# Patient Record
Sex: FEMALE | Race: WHITE | NOT HISPANIC OR LATINO | Employment: OTHER | URBAN - METROPOLITAN AREA
[De-identification: names, ages, dates, MRNs, and addresses within clinical notes are randomized per-mention and may not be internally consistent; named-entity substitution may affect disease eponyms.]

---

## 2017-02-23 ENCOUNTER — OFFICE VISIT (OUTPATIENT)
Dept: URGENT CARE | Facility: URGENT CARE | Age: 82
End: 2017-02-23
Payer: COMMERCIAL

## 2017-02-23 VITALS
RESPIRATION RATE: 20 BRPM | BODY MASS INDEX: 43.59 KG/M2 | SYSTOLIC BLOOD PRESSURE: 161 MMHG | WEIGHT: 278.3 LBS | OXYGEN SATURATION: 98 % | TEMPERATURE: 98 F | DIASTOLIC BLOOD PRESSURE: 89 MMHG | HEART RATE: 108 BPM

## 2017-02-23 DIAGNOSIS — H65.00 ACUTE SEROUS OTITIS MEDIA, RECURRENCE NOT SPECIFIED, UNSPECIFIED LATERALITY: Primary | ICD-10-CM

## 2017-02-23 PROCEDURE — 99213 OFFICE O/P EST LOW 20 MIN: CPT | Performed by: NURSE PRACTITIONER

## 2017-02-23 RX ORDER — AMOXICILLIN 500 MG/1
500 CAPSULE ORAL 3 TIMES DAILY
Qty: 30 CAPSULE | Refills: 0 | Status: SHIPPED | OUTPATIENT
Start: 2017-02-23 | End: 2017-03-05

## 2017-02-23 NOTE — NURSING NOTE
"Chief Complaint   Patient presents with     Ear Problem     Lt ear pain        Initial /89 (BP Location: Right arm, Patient Position: Chair, Cuff Size: Adult Large)  Pulse 108  Temp 98  F (36.7  C) (Oral)  Resp 20  Wt 278 lb 4.8 oz (126.2 kg)  SpO2 98%  BMI 43.59 kg/m2 Estimated body mass index is 43.59 kg/(m^2) as calculated from the following:    Height as of 12/16/16: 5' 7\" (1.702 m).    Weight as of this encounter: 278 lb 4.8 oz (126.2 kg).      "

## 2017-02-23 NOTE — PATIENT INSTRUCTIONS
Middle Ear Infection (Adult)  You have an infection of the middle ear (the space behind the eardrum). This is also called acute otitis media (AOM). Sometimes it is caused by the common cold. This is because congestion can block the internal passage (eustachian tube) that drains fluid from the middle ear. When the middle ear fills with fluid, bacteria can grow there and cause an infection. Oral antibiotics are used to treat this illness, not ear drops. Symptoms usually start to improve within 1 to 2 days of treatment.    Home care  The following are general care guidelines:    Finish all of the antibiotic medicine given, even though you may feel better after the first few days.    You may use acetaminophen or ibuprofen to control pain, unless something else was prescribed. [NOTE: If you have chronic liver or kidney disease or have ever had a stomach ulcer or GI bleeding, talk with your doctor before using these medicines.] Do not give aspirin to anyone under 18 years of age who has a fever. It may cause severe liver damage.  Follow-up care  Follow up with your doctor in 2 weeks if all symptoms have not gotten better, or if hearing doesn't go back to normal within 1 month.  When to seek medical care  Get prompt medical attention if any of the following occur:    Ear pain gets worse or does not improve after 3 days of treatment    Unusual drowsiness or confusion    Neck pain, stiff neck, or headache    Fluid or blood draining from the ear canal    Fever of 100.4 F (38 C) or higher after 3 days of antibiotics, or as directed by your health care provider    Convulsion (seizure)    4363-9170 [employer]. [address] [place] [address], [place] [address]. All rights reserved. This information is not intended as a substitute for professional medical care. Always follow your healthcare professional's instructions.

## 2017-02-23 NOTE — MR AVS SNAPSHOT
After Visit Summary   2/23/2017    Moira Andre    MRN: [id number]           Patient Information     Date Of Birth          9/24/1933        Visit Information        Provider Department      2/23/2017 5:00 PM Javon Mota APRN CNP [facility]        Today's Diagnoses     Acute serous otitis media, recurrence not specified, unspecified laterality    -  1      Care Instructions      Middle Ear Infection (Adult)  You have an infection of the middle ear (the space behind the eardrum). This is also called acute otitis media (AOM). Sometimes it is caused by the common cold. This is because congestion can block the internal passage (eustachian tube) that drains fluid from the middle ear. When the middle ear fills with fluid, bacteria can grow there and cause an infection. Oral antibiotics are used to treat this illness, not ear drops. Symptoms usually start to improve within 1 to 2 days of treatment.    Home care  The following are general care guidelines:    Finish all of the antibiotic medicine given, even though you may feel better after the first few days.    You may use acetaminophen or ibuprofen to control pain, unless something else was prescribed. [NOTE: If you have chronic liver or kidney disease or have ever had a stomach ulcer or GI bleeding, talk with your doctor before using these medicines.] Do not give aspirin to anyone under 18 years of age who has a fever. It may cause severe liver damage.  Follow-up care  Follow up with your doctor in 2 weeks if all symptoms have not gotten better, or if hearing doesn't go back to normal within 1 month.  When to seek medical care  Get prompt medical attention if any of the following occur:    Ear pain gets worse or does not improve after 3 days of treatment    Unusual drowsiness or confusion    Neck pain, stiff neck, or headache    Fluid or blood draining from the ear canal    Fever of 100.4 F (38 C) or higher after 3 days  of antibiotics, or as directed by your health care provider    Convulsion (seizure)    6107-5566 [employer]. [address] [place] [address], [place] [address]. All rights reserved. This information is not intended as a substitute for professional medical care. Always follow your healthcare professional's instructions.              Follow-ups after your visit        Who to contact     If you have questions or need follow up information about today's clinic visit or your schedule please contact [facility] directly at [phone].  Normal or non-critical lab and imaging results will be communicated to you by MyChart, letter or phone within 4 business days after the clinic has received the results. If you do not hear from us within 7 days, please contact the clinic through MyChart or phone. If you have a critical or abnormal lab result, we will notify you by phone as soon as possible.  Submit refill requests through [employer] or call your pharmacy and they will forward the refill request to us. Please allow 3 business days for your refill to be completed.          Additional Information About Your Visit        Care EveryWhere ID     This is your Care EveryWhere ID. This could be used by other organizations to access your [facility] medical records  [id number]        Your Vitals Were     Pulse Temperature Respirations Pulse Oximetry BMI (Body Mass Index)       108 98  F (36.7  C) (Oral) 20 98% 43.59 kg/m2        Blood Pressure from Last 3 Encounters:   02/23/17 161/89   12/27/16 132/80   12/16/16 132/69    Weight from Last 3 Encounters:   02/23/17 278 lb 4.8 oz (126.2 kg)   12/27/16 276 lb (125.2 kg)   12/16/16 275 lb 9.6 oz (125 kg)              Today, you had the following     No orders found for display         Today's Medication Changes          These changes are accurate as of: 2/23/17  6:48 PM.  If you have any questions, ask your nurse or doctor.               Start taking these  medicines.        Dose/Directions    amoxicillin 500 MG capsule   Commonly known as:  AMOXIL   Started by:  Javon Mota APRN CNP        Dose:  500 mg   Take 1 capsule (500 mg) by mouth 3 times daily for 10 days   Quantity:  30 capsule   Refills:  0            Where to get your medicines      These medications were sent to [facility] [address]  [address], [facility] [address]     Phone:  [phone]     amoxicillin 500 MG capsule                Primary Care Provider Office Phone # Fax #    Maryan Churchill [phone]       [facility]    [address] [facility] [address]  [facility] [address]        Thank you!     Thank you for choosing [facility]  for your care. Our goal is always to provide you with excellent care. Hearing back from our patients is one way we can continue to improve our services. Please take a few minutes to complete the written survey that you may receive in the mail after your visit with us. Thank you!             Your Updated Medication List - Protect others around you: Learn how to safely use, store and throw away your medicines at www.disposemymeds.org.          This list is accurate as of: 2/23/17  6:48 PM.  Always use your most recent med list.                   Brand Name Dispense Instructions for use    acetaminophen 325 MG tablet    TYLENOL     Take 325-650 mg by mouth every 6 hours as needed for mild pain       ADVAIR DISKUS 100-50 MCG/DOSE diskus inhaler   Generic drug:  fluticasone-salmeterol     60 Inhaler    INHALE 1 PUFF EVERY 12 HOURS       albuterol 108 (90 BASE) MCG/ACT Inhaler    PROAIR HFA/PROVENTIL HFA/VENTOLIN HFA    3 Inhaler    Inhale 2 puffs into the lungs every 6 hours as needed for shortness of breath / dyspnea       amoxicillin 500 MG capsule    AMOXIL    30 capsule    Take 1 capsule (500 mg) by mouth 3 times daily for 10 days       atorvastatin 20 MG  tablet    LIPITOR    90 tablet    Take 1 tablet (20 mg) by mouth daily       azithromycin 250 MG tablet    ZITHROMAX    6 tablet    Two tablets first day, then one tablet daily for four days.       blood glucose monitoring meter device kit    no brand specified    1 kit    Use to test blood sugar 1-2 times daily or as directed.       blood glucose monitoring test strip    no brand specified    100 each    Use to test blood sugars 1-2 times daily or as directed       calcium-vitamin D 600-400 MG-UNIT per tablet    CALTRATE     Take 1 tablet by mouth 2 times daily       clopidogrel 75 MG tablet    PLAVIX    90 tablet    Take 1 tablet (75 mg) by mouth daily       DULoxetine 30 MG EC capsule    CYMBALTA    90 capsule    Take 1 capsule (30 mg) by mouth daily       FEXOFENADINE HCL PO      Take 180 mg by mouth daily.       FLONASE 50 MCG/ACT spray   Generic drug:  fluticasone      Spray 1 spray into both nostrils daily       Glucosamine HCl 750 MG Tabs      Take 1 tablet by mouth 2 times daily       HAIR/SKIN/NAILS Tabs      Take 1 tablet by mouth daily.       HYDROcodone-acetaminophen 5-325 MG per tablet    NORCO    90 tablet    Take 1 tablet by mouth every 8 hours as needed for moderate to severe pain       IBANdronate 150 MG tablet    BONIVA    3 tablet    Take 1 tablet (150 mg) by mouth every 30 days       lisinopril 5 MG tablet    PRINIVIL/ZESTRIL    90 tablet    Take 1 tablet (5 mg) by mouth daily       metoprolol 100 MG 24 hr tablet    TOPROL-XL    90 tablet    Take 1 tablet (100 mg) by mouth daily       NITROQUICK SL      Place 0.4 mg under the tongue every 5 minutes as needed       nystatin-triamcinolone cream    MYCOLOG II    60 g    Apply topically 2 times daily       order for DME      DREAMSTATION 5-15 CM/H20 NASAL WISP FABRIC       TRUEPLUS LANCETS 28G Misc     100 each    1 each 2 times daily as needed       vitamin D 2000 UNITS Caps      Take by mouth daily

## 2017-02-23 NOTE — PROGRESS NOTES
SUBJECTIVE:   Moira Andre is a 83 year old female here for eval of left ear pain for 4 days. She denies fever, chills, nausea dizziness, headache or hearing loss. States ear pain is worse today tried OTC medication and nothing is helping her.     OBJECTIVE:  She appears well, vital signs are as noted by the nurse. /89 (BP Location: Right arm, Patient Position: Chair, Cuff Size: Adult Large)  Pulse 108  Temp 98  F (36.7  C) (Oral)  Resp 20  Wt 278 lb 4.8 oz (126.2 kg)  SpO2 98%  BMI 43.59 kg/m2   Left TM partially obscured by wax and  erythema noted. Right TM erythema. Throat and pharynx normal.  Neck supple. No adenopathy in the neck. Nose is congested. Sinuses non tender.     ASSESSMENT:   Left otitis media     PLAN:Rx Amoxicillin checked creatinine with pharmacist antibiotic should be ok as prescribed.  She also had this medication in the past without allergy. Symptomatic therapy suggested: push fluids, rest, gargle warm salt water and use vaporizer or mist prn.  Return if these symptoms worsen f/u with primary care provider.

## 2017-03-06 ENCOUNTER — TELEPHONE (OUTPATIENT)
Dept: FAMILY MEDICINE | Facility: CLINIC | Age: 82
End: 2017-03-06

## 2017-03-06 NOTE — TELEPHONE ENCOUNTER
I called patient and spoke with her.  She reports that ears are feeling better and she has finished the ABX as well.  She is wondering if she needs to still do ear drops? Ears are feeling better.   Bought ear plugs for when she goes to [facility].     PCP: okay for patient d/c ear drops. If okay patient would like call back  RN can discontinue off active medication list.    Tamra Mccullough, RN

## 2017-03-31 ENCOUNTER — OFFICE VISIT (OUTPATIENT)
Dept: URGENT CARE | Facility: URGENT CARE | Age: 82
End: 2017-03-31
Payer: COMMERCIAL

## 2017-03-31 VITALS
OXYGEN SATURATION: 95 % | DIASTOLIC BLOOD PRESSURE: 76 MMHG | HEART RATE: 72 BPM | SYSTOLIC BLOOD PRESSURE: 136 MMHG | BODY MASS INDEX: 43.23 KG/M2 | WEIGHT: 276 LBS | TEMPERATURE: 98.1 F

## 2017-03-31 DIAGNOSIS — H92.02 OTALGIA OF LEFT EAR: Primary | ICD-10-CM

## 2017-03-31 PROCEDURE — 99213 OFFICE O/P EST LOW 20 MIN: CPT | Performed by: FAMILY MEDICINE

## 2017-03-31 RX ORDER — AMOXICILLIN 500 MG/1
500 CAPSULE ORAL 3 TIMES DAILY
Qty: 30 CAPSULE | Refills: 0 | Status: SHIPPED | OUTPATIENT
Start: 2017-03-31 | End: 2017-04-10

## 2017-03-31 NOTE — NURSING NOTE
"Chief Complaint   Patient presents with     Ear Problem     lt ear pain for 2 days       Initial /76 (BP Location: Left arm, Patient Position: Chair, Cuff Size: Adult Large)  Pulse 72  Temp 98.1  F (36.7  C) (Oral)  Wt 276 lb (125.2 kg)  SpO2 95%  BMI 43.23 kg/m2 Estimated body mass index is 43.23 kg/(m^2) as calculated from the following:    Height as of 12/16/16: 5' 7\" (1.702 m).    Weight as of this encounter: 276 lb (125.2 kg).  Medication Reconciliation: complete   Blanca REEDER      "

## 2017-03-31 NOTE — MR AVS SNAPSHOT
"              After Visit Summary   3/31/2017    Moira Andre    MRN: [id number]           Patient Information     Date Of Birth          1933        Visit Information        Provider Department      3/31/2017 11:15 AM Leland Palacios, DO [facility]        Today's Diagnoses     Otalgia of left ear    -  1       Follow-ups after your visit        Who to contact     If you have questions or need follow up information about today's clinic visit or your schedule please contact [facility] directly at [phone].  Normal or non-critical lab and imaging results will be communicated to you by [employer], letter or phone within 4 business days after the clinic has received the results. If you do not hear from us within 7 days, please contact the clinic through [employer] or phone. If you have a critical or abnormal lab result, we will notify you by phone as soon as possible.  Submit refill requests through Triage or call your pharmacy and they will forward the refill request to us. Please allow 3 business days for your refill to be completed.          Additional Information About Your Visit        MyChart Information     Triage lets you send messages to your doctor, view your test results, renew your prescriptions, schedule appointments and more. To sign up, go to [email or web address] . Click on \"Log in\" on the left side of the screen, which will take you to the Welcome page. Then click on \"Sign up Now\" on the right side of the page.     You will be asked to enter the access code listed below, as well as some personal information. Please follow the directions to create your username and password.     Your access code is: [email or web address]  Expires: 2017 11:38 AM     Your access code will  in 90 days. If you need help or a new code, please call your [place] clinic or [phone].        Care EveryWhere ID     This is your Care EveryWhere ID. This could " be used by other organizations to access your [facility] medical records  [id number]        Your Vitals Were     Pulse Temperature Pulse Oximetry BMI (Body Mass Index)          72 98.1  F (36.7  C) (Oral) 95% 43.23 kg/m2         Blood Pressure from Last 3 Encounters:   03/31/17 136/76   02/23/17 161/89   12/27/16 132/80    Weight from Last 3 Encounters:   03/31/17 276 lb (125.2 kg)   02/23/17 278 lb 4.8 oz (126.2 kg)   12/27/16 276 lb (125.2 kg)              Today, you had the following     No orders found for display         Today's Medication Changes          These changes are accurate as of: 3/31/17 11:38 AM.  If you have any questions, ask your nurse or doctor.               Start taking these medicines.        Dose/Directions    amoxicillin 500 MG capsule   Commonly known as:  AMOXIL   Used for:  Otalgia of left ear        Dose:  500 mg   Take 1 capsule (500 mg) by mouth 3 times daily for 10 days   Quantity:  30 capsule   Refills:  0            Where to get your medicines      Some of these will need a paper prescription and others can be bought over the counter.  Ask your nurse if you have questions.     Bring a paper prescription for each of these medications     amoxicillin 500 MG capsule                Primary Care Provider Office Phone # Fax #    Maryan Churchill [phone]       [facility]    [address] [facility] [address]  [facility] [address]        Thank you!     Thank you for choosing [facility]  for your care. Our goal is always to provide you with excellent care. Hearing back from our patients is one way we can continue to improve our services. Please take a few minutes to complete the written survey that you may receive in the mail after your visit with us. Thank you!             Your Updated Medication List - Protect others around you: Learn how to safely use, store and throw away your medicines at www.disposemymeds.org.          This  list is accurate as of: 3/31/17 11:38 AM.  Always use your most recent med list.                   Brand Name Dispense Instructions for use    acetaminophen 325 MG tablet    TYLENOL     Take 325-650 mg by mouth every 6 hours as needed for mild pain       ADVAIR DISKUS 100-50 MCG/DOSE diskus inhaler   Generic drug:  fluticasone-salmeterol     60 Inhaler    INHALE 1 PUFF EVERY 12 HOURS       albuterol 108 (90 BASE) MCG/ACT Inhaler    PROAIR HFA/PROVENTIL HFA/VENTOLIN HFA    3 Inhaler    Inhale 2 puffs into the lungs every 6 hours as needed for shortness of breath / dyspnea       amoxicillin 500 MG capsule    AMOXIL    30 capsule    Take 1 capsule (500 mg) by mouth 3 times daily for 10 days       atorvastatin 20 MG tablet    LIPITOR    90 tablet    Take 1 tablet (20 mg) by mouth daily       blood glucose monitoring meter device kit    no brand specified    1 kit    Use to test blood sugar 1-2 times daily or as directed.       blood glucose monitoring test strip    no brand specified    100 each    Use to test blood sugars 1-2 times daily or as directed       calcium-vitamin D 600-400 MG-UNIT per tablet    CALTRATE     Take 1 tablet by mouth 2 times daily       clopidogrel 75 MG tablet    PLAVIX    90 tablet    Take 1 tablet (75 mg) by mouth daily       DULoxetine 30 MG EC capsule    CYMBALTA    90 capsule    Take 1 capsule (30 mg) by mouth daily       FEXOFENADINE HCL PO      Take 180 mg by mouth daily.       FLONASE 50 MCG/ACT spray   Generic drug:  fluticasone      Spray 1 spray into both nostrils daily       Glucosamine HCl 750 MG Tabs      Take 1 tablet by mouth 2 times daily       HAIR/SKIN/NAILS Tabs      Take 1 tablet by mouth daily.       HYDROcodone-acetaminophen 5-325 MG per tablet    NORCO    90 tablet    Take 1 tablet by mouth every 8 hours as needed for moderate to severe pain       IBANdronate 150 MG tablet    BONIVA    3 tablet    Take 1 tablet (150 mg) by mouth every 30 days       lisinopril 5 MG tablet     PRINIVIL/ZESTRIL    90 tablet    Take 1 tablet (5 mg) by mouth daily       metoprolol 100 MG 24 hr tablet    TOPROL-XL    90 tablet    Take 1 tablet (100 mg) by mouth daily       NITROQUICK SL      Place 0.4 mg under the tongue every 5 minutes as needed       nystatin-triamcinolone cream    MYCOLOG II    60 g    Apply topically 2 times daily       order for DME      DREAMSTATION 5-15 CM/H20 NASAL WISP FABRIC       TRUEPLUS LANCETS 28G Misc     100 each    1 each 2 times daily as needed       vitamin D 2000 UNITS Caps      Take by mouth daily

## 2017-04-10 NOTE — PROGRESS NOTES
SUBJECTIVE: Moira Andre is a 83 year old female presenting with a chief complaint of ear pain left.  Onset of symptoms was day(s) ago.  Course of illness is same.    Severity moderate  Current and Associated symptoms: nasal congestion  Treatment measures tried include None tried.  Predisposing factors include None.    Past Medical History:   Diagnosis Date     Aortic valve sclerosis     heart murmur, no AS     Arrhythmia     PAT, PVC     Aspirin allergy     Plavix use long term     Asthma      CKD (chronic kidney disease) stage 3, GFR 30-59 ml/min     x 2007 atleast     Congestive heart failure, unspecified      Depression      Diabetes mellitus (H) 2010     Diastolic dysfunction, left ventricle 2013    grade 2, nl ef     HTN (hypertension)      Migraine headache      Myocardial infarction (H) 9/2007, cath 2013 ml    BMS: stent to OM, diag, nl EF, echo /C angia 2013 , f/u cath no lesion >40%     OA (osteoarthritis) of knee      Obesity      Rheumatoid arthritis flare (H)     prednisone     Sleep apnea     on CPAP (not using 2016)     TIA (transient ischaemic attack)      Ventral hernia, unspecified, without mention of obstruction or gangrene      Allergies   Allergen Reactions     Aspirin Hives     Reaction occurred during childhood.      Minocycline      Yellow Dye Allergy. Minocycline has Yellow Dye #10.     Yellow Dye Hives     Rxn to yellow tablet. Eyes swelled shut.      Social History   Substance Use Topics     Smoking status: Never Smoker     Smokeless tobacco: Never Used     Alcohol use 0.0 - 0.6 oz/week     0 - 1 Standard drinks or equivalent per week      Comment: rarely       ROS:  SKIN: no rash  GI: no vomiting    OBJECTIVE:  /76 (BP Location: Left arm, Patient Position: Chair, Cuff Size: Adult Large)  Pulse 72  Temp 98.1  F (36.7  C) (Oral)  Wt 276 lb (125.2 kg)  SpO2 95%  BMI 43.23 kg/y1AGDTBSG APPEARANCE: healthy, alert and no distress  EYES: EOMI,  PERRL, conjunctiva clear  HENT: ear  canals and TM's normal.  Nose and mouth without ulcers, erythema or lesions  NECK: supple, nontender, no lymphadenopathy  RESP: lungs clear to auscultation - no rales, rhonchi or wheezes  SKIN: no suspicious lesions or rashes      ICD-10-CM    1. Otalgia of left ear H92.02 amoxicillin (AMOXIL) 500 MG capsule       Fluids/Rest, f/u if worse/not any better

## 2017-04-11 ENCOUNTER — OFFICE VISIT (OUTPATIENT)
Dept: FAMILY MEDICINE | Facility: CLINIC | Age: 82
End: 2017-04-11
Payer: COMMERCIAL

## 2017-04-11 VITALS
BODY MASS INDEX: 43.95 KG/M2 | SYSTOLIC BLOOD PRESSURE: 139 MMHG | DIASTOLIC BLOOD PRESSURE: 70 MMHG | TEMPERATURE: 96.6 F | OXYGEN SATURATION: 97 % | RESPIRATION RATE: 20 BRPM | WEIGHT: 280 LBS | HEIGHT: 67 IN | HEART RATE: 102 BPM

## 2017-04-11 DIAGNOSIS — I10 ESSENTIAL HYPERTENSION WITH GOAL BLOOD PRESSURE LESS THAN 140/90: ICD-10-CM

## 2017-04-11 DIAGNOSIS — K43.9 VENTRAL HERNIA WITHOUT OBSTRUCTION OR GANGRENE: ICD-10-CM

## 2017-04-11 DIAGNOSIS — E11.21 TYPE 2 DIABETES MELLITUS WITH DIABETIC NEPHROPATHY, WITHOUT LONG-TERM CURRENT USE OF INSULIN (H): Primary | ICD-10-CM

## 2017-04-11 DIAGNOSIS — M25.50 MULTIPLE JOINT PAIN: ICD-10-CM

## 2017-04-11 DIAGNOSIS — F33.1 MAJOR DEPRESSIVE DISORDER, RECURRENT EPISODE, MODERATE (H): ICD-10-CM

## 2017-04-11 DIAGNOSIS — K11.1 PAROTID GLAND ENLARGEMENT: ICD-10-CM

## 2017-04-11 DIAGNOSIS — J45.20 INTERMITTENT ASTHMA, UNCOMPLICATED: ICD-10-CM

## 2017-04-11 DIAGNOSIS — I10 ESSENTIAL HYPERTENSION: ICD-10-CM

## 2017-04-11 LAB — HBA1C MFR BLD: 6.7 % (ref 4.3–6)

## 2017-04-11 PROCEDURE — 36415 COLL VENOUS BLD VENIPUNCTURE: CPT | Performed by: INTERNAL MEDICINE

## 2017-04-11 PROCEDURE — 99215 OFFICE O/P EST HI 40 MIN: CPT | Performed by: INTERNAL MEDICINE

## 2017-04-11 PROCEDURE — 80053 COMPREHEN METABOLIC PANEL: CPT | Performed by: INTERNAL MEDICINE

## 2017-04-11 PROCEDURE — 99207 C FOOT EXAM  NO CHARGE: CPT | Performed by: INTERNAL MEDICINE

## 2017-04-11 PROCEDURE — 80061 LIPID PANEL: CPT | Performed by: INTERNAL MEDICINE

## 2017-04-11 PROCEDURE — 83036 HEMOGLOBIN GLYCOSYLATED A1C: CPT | Performed by: INTERNAL MEDICINE

## 2017-04-11 RX ORDER — LISINOPRIL 10 MG/1
10 TABLET ORAL DAILY
Qty: 90 TABLET | Refills: 1 | Status: SHIPPED | OUTPATIENT
Start: 2017-04-11 | End: 2018-01-17

## 2017-04-11 RX ORDER — HYDROCODONE BITARTRATE AND ACETAMINOPHEN 5; 325 MG/1; MG/1
1 TABLET ORAL EVERY 8 HOURS PRN
Qty: 90 TABLET | Refills: 0 | Status: SHIPPED | OUTPATIENT
Start: 2017-04-11 | End: 2018-04-03

## 2017-04-11 NOTE — LETTER
[place] [address] [address] [facility]  [address]  [place]  [address]  Tel: [phone]    April 12, 2017    Moira ALEGRIA Jes  [address]  [facility] [address]        Dear Ms. Andre,    Your chronic kidney disease is stable.  Your total cholesterol is normal.  HDL which is called good cholesterol is normal.  Your LDL cholesterol is normal.  This is often call bad cholesterol and high levels increase the risk for heart attacks and strokes.  The triglycerides are high. Lowering  the amount of sugar ,alcohol and sweets in the diet helps to control this.Exercise and weight loss helps.  HbA1c which is average glucose during last 3 months is 6.7%.  Lab Results       Component                Value               Date                       A1C                      6.7                 04/11/2017                 A1C                      6.7                 12/16/2016                 A1C                      6.7                 09/16/2016                 A1C                      7.0                 06/28/2016                 A1C                      6.3                 02/09/2016            Eat low cholesterol low fat  diet and do regular physical activity. Avoid high sugar containing food.  Please send the copy of lab results also to this patient.    If you have any further questions or problems, please contact our office.      Sincerely,    Maryan Churchill MD/jennifer     Enclosure: Lab Results  Results for orders placed or performed in visit on 04/11/17   Hemoglobin A1c   Result Value Ref Range    Hemoglobin A1C 6.7 (H) 4.3 - 6.0 %   Comprehensive metabolic panel   Result Value Ref Range    Sodium 140 133 - 144 mmol/L    Potassium 4.3 3.4 - 5.3 mmol/L    Chloride 107 94 - 109 mmol/L    Carbon Dioxide 20 20 - 32 mmol/L    Anion Gap 13 3 - 14 mmol/L    Glucose 171 (H) 70 - 99 mg/dL    Urea Nitrogen 29 7 - 30 mg/dL    Creatinine 1.27 (H) 0.52 - 1.04 mg/dL    GFR Estimate 40 (L) >60 mL/min/1.7m2    GFR Estimate  If Black 49 (L) >60 mL/min/1.7m2    Calcium 8.5 8.5 - 10.1 mg/dL    Bilirubin Total 0.3 0.2 - 1.3 mg/dL    Albumin 3.6 3.4 - 5.0 g/dL    Protein Total 7.1 6.8 - 8.8 g/dL    Alkaline Phosphatase 65 40 - 150 U/L    ALT 22 0 - 50 U/L    AST 15 0 - 45 U/L   Lipid Profile with reflex to direct LDL   Result Value Ref Range    Cholesterol 154 <200 mg/dL    Triglycerides 244 (H) <150 mg/dL    HDL Cholesterol 50 >49 mg/dL    LDL Cholesterol Calculated 55 <100 mg/dL    Non HDL Cholesterol 104 <130 mg/dL

## 2017-04-11 NOTE — PATIENT INSTRUCTIONS
Please call [facility] to schedule your parotid ultrasound  [phone]  Make appointment with rheumatology  Make appointment with general surgery  Labs today  The dose of lisinopril is increased.  Follow up in 4 months  Seek sooner medical attention if there is any worsening of symptoms or problems.

## 2017-04-11 NOTE — PROGRESS NOTES
SUBJECTIVE:                                                    Moira Andre is a 83 year old female who presents to clinic today for the following health issues:    Arthritic Pain  Patient has not been following with her rheumatologist  She was told she didn't need to follow up  A [employer] nurse calls her weekly to check in  She uses a half tablet of hydrocodone as needed for pain  She has also started to use turmeric to alleviate her pain  She believes that this treatment has helped to manage her pain  As her pain improves, she states that her depression also improves  She has also been experiencing an increase in ear aches  Got ear aches even when she was taking amoxicillin   Had her ears checked and they were normal, was told she could be having arthritic pain  Experienced a large increase in pain when she wore her cpap because the straps run over the painful area anterior to her ears  She took pain medication and used hot pads, which helped to decrease her pain    Shortness of Breath   She gets short of breath from walking and doing chores around the house  Believes these symptoms are due to her weight, is currently going to [facility] for help  Does not use her albuterol for these symptoms  Only uses albuterol when she begins to wheeze     Hernia  She has an abdominal wall hernia in her RLQ  Has been aware of this but has not had it evaluated by a surgeon  Typically the hernia does not cause much discomfort  She had one event where she exercised too much and experienced pain from this area  Would like to have this corrected  She plans to start working at [employer] in May and this job would require her to carry a suitcase into and out of the building every day      Problem list and histories reviewed & adjusted, as indicated.  Additional history: as documented    Patient Active Problem List   Diagnosis     CKD (chronic kidney disease) stage 3, GFR 30-59 ml/min     Morbid obesity (H)     CAD  (coronary artery disease)     Type 2 diabetes mellitus with diabetic nephropathy (H)     Transient cerebral ischemia     Hyperlipidemia LDL goal <70     Advanced directives, counseling/discussion     Ventral hernia     Intermittent asthma     Moderate major depression (H)     ELIGIO on CPAP     Microalbuminuria     S/P total knee arthroplasty     OA (osteoarthritis) of knee     Anemia due to blood loss, acute     Health Care Home     Essential hypertension     Gastroesophageal reflux disease without esophagitis     Bilateral edema of lower extremity     Osteoarthritis     Rheumatoid arthritis involving both hands with negative rheumatoid factor (H)     High risk medication use     Anxiety     Other chronic pain     Controlled substance agreement signed     Past Surgical History:   Procedure Laterality Date     APPENDECTOMY       BIOPSY BREAST      x2 -needle & lumpectomy-benign     CHOLECYSTECTOMY       CORONARY ANGIOGRAPHY ADULT ORDER  2007    Bare metal stent to OM1, Diagonal patent      CORONARY ANGIOGRAPHY ADULT ORDER  2007    Senath stent to Diagonal     HC LEFT HEART CATHETERIZATION  2013    Moderate CAD     HYSTERECTOMY TOTAL ABDOMINAL       ORTHOPEDIC SURGERY      knee replacement on right side (), Left side ()     RELEASE CARPAL TUNNEL      right and left     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 Father       at 8o's, ? prostate ca     Breast Cancer No family hx of      Cancer - colorectal No family hx of          Current Outpatient Prescriptions   Medication Sig Dispense Refill     TURMERIC PO        nystatin-triamcinolone (MYCOLOG II) cream  Apply topically 2 times daily 60 g 1     IBANdronate (BONIVA) 150 MG tablet Take 1 tablet (150 mg) by mouth every 30 days 3 tablet 3     ADVAIR DISKUS 100-50 MCG/DOSE diskus inhaler INHALE 1 PUFF EVERY 12 HOURS 60 Inhaler 3     DULoxetine (CYMBALTA) 30 MG capsule Take 1 capsule (30 mg) by mouth daily 90 capsule 1     metoprolol (TOPROL-XL) 100 MG 24 hr tablet Take 1 tablet (100 mg) by mouth daily 90 tablet 3     clopidogrel (PLAVIX) 75 MG tablet Take 1 tablet (75 mg) by mouth daily 90 tablet 3     lisinopril (PRINIVIL,ZESTRIL) 5 MG tablet Take 1 tablet (5 mg) by mouth daily 90 tablet 3     atorvastatin (LIPITOR) 20 MG tablet Take 1 tablet (20 mg) by mouth daily 90 tablet 3     HYDROcodone-acetaminophen (NORCO) 5-325 MG per tablet Take 1 tablet by mouth every 8 hours as needed for moderate to severe pain 90 tablet 0     order for DME DREAMSTATION  5-15 CM/H20  NASAL WISP FABRIC       blood glucose monitoring (NO BRAND SPECIFIED) meter device kit Use to test blood sugar 1-2 times daily or as directed. 1 kit 0     blood glucose monitoring (NO BRAND SPECIFIED) test strip Use to test blood sugars 1-2 times daily or as directed 100 each 3     TRUEPLUS LANCETS 28G MISC 1 each 2 times daily as needed 100 each 3     acetaminophen (TYLENOL) 325 MG tablet Take 325-650 mg by mouth every 6 hours as needed for mild pain       Glucosamine HCl 750 MG TABS Take 1 tablet by mouth 2 times daily       Cholecalciferol (VITAMIN D) 2000 UNITS CAPS Take by mouth daily       calcium-vitamin D (CALTRATE) 600-400 MG-UNIT per tablet Take 1 tablet by mouth 2 times daily       fluticasone (FLONASE) 50 MCG/ACT nasal spray Spray 1 spray into both nostrils daily       albuterol (PROAIR HFA, PROVENTIL HFA, VENTOLIN HFA) 108 (90 BASE) MCG/ACT inhaler Inhale 2 puffs into the lungs every 6 hours as needed for shortness of breath / dyspnea 3 Inhaler 3     Multiple Vitamins-Minerals (HAIR/SKIN/NAILS) TABS Take 1 tablet by mouth daily.        FEXOFENADINE  "HCL PO Take 180 mg by mouth daily.       Nitroglycerin (NITROQUICK SL) Place 0.4 mg under the tongue every 5 minutes as needed        Allergies   Allergen Reactions     Aspirin Hives     Reaction occurred during childhood.      Minocycline      Yellow Dye Allergy. Minocycline has Yellow Dye #10.     Yellow Dye Hives     Rxn to yellow tablet. Eyes swelled shut.        Reviewed and updated as needed this visit by clinical staff  Allergies  Meds       Reviewed and updated as needed this visit by Provider         ROS:  Constitutional, HEENT, cardiovascular, pulmonary, gi and gu systems are negative, except as otherwise noted.    POS for ear pain, joint pain, wheezing, shortness of breath, abdominal hernia      This document serves as a record of the services and decisions personally performed and made by Maryan Churchill MD. It was created on her behalf by Rene Kaye, a trained medical scribe. The creation of this document is based on the provider's statements to the medical scribe.    Scribdayan Kaye 11:18 AM, April 11, 2017    OBJECTIVE:                                                    /70 (BP Location: Left arm, Patient Position: Chair, Cuff Size: Adult Regular)  Pulse 102  Temp 96.6  F (35.9  C) (Oral)  Resp 20  Ht 1.702 m (5' 7\")  Wt 127 kg (280 lb)  SpO2 97%  BMI 43.85 kg/m2  Body mass index is 43.85 kg/(m^2).  GENERAL APPEARANCE: healthy, alert and no distress  EYES: Eyes grossly normal to inspection, PERRL and conjunctivae and sclerae normal  HENT: Parotid glands are enlarged bilaterally, otherwise ear canals and TM's normal and nose and mouth without ulcers or lesions  NECK: no adenopathy  RESP: lungs clear to auscultation - no rales, rhonchi or wheezes  CV: regular rates and rhythm, normal S1 S2, no S3  ABDOMEN: Abdominal wall hernia in RLQ, otherwise soft, nontender, without hepatosplenomegaly or masses and bowel sounds normal  Diabetic foot exam was performed.  Good dorsalis pedis " and posterior tibial.  5 point sensory exam was normal.  skin is intact  Skin is dry, has toenail fungus       ASSESSMENT/PLAN:                                                      Moira was seen today for recheck.    Diagnoses and all orders for this visit:    Type 2 diabetes mellitus with diabetic nephropathy, without long-term current use of insulin (H)  Her blood sugars have been stable  Will continue with current treatment  Will continue to monitor this condition  Lab Results   Component Value Date    A1C 6.7 04/11/2017    A1C 6.7 12/16/2016    A1C 6.7 09/16/2016    A1C 7.0 06/28/2016    A1C 6.3 02/09/2016   -     Hemoglobin A1c  -     Comprehensive metabolic panel  -     FOOT EXAM  -     Lipid Profile with reflex to direct LDL    Major depressive disorder, recurrent episode, moderate (H)  She believes her symptoms have improved since she has been getting her pain under control  Still has symptoms of depression that she is working to manage    Intermittent asthma, uncomplicated  Will continue using her albuterol for asthma treatment    Essential hypertension  Her blood pressure is slightly elevated  Will increase her lisinopril dose to maintain a lower blood pressure  Discussed the importance of keeping BP under good control to reduce the risk of heart attack and stroke.   Advised to continue to monitor bp once a week  at home and bring those readings on next visit.  Notify us if bp readings consistently stay greater than 140/90 mmHg  Discussed the importance of physical activity and a healthy diet    Multiple joint pain  Advised her to make an appointment with rheumatology   Can continue to use hydrocodone as needed for pain  She can continue to use turmeric if this is helping her symptoms  -     RHEUMATOLOGY REFERRAL  -     HYDROcodone-acetaminophen (NORCO) 5-325 MG per tablet; Take 1 tablet by mouth every 8 hours as needed for moderate to severe pain  Her last rheumatologist thinks patient does not has RA  but previous rheumatologist said it is RA.    Parotid gland enlargement  Could be the cause of her ear pain  Will schedule an appointment with radiology for an ultrasound  -     US Parotid; Future    Ventral hernia without obstruction or gangrene  She will schedule an appointment with general surgery for evaluation  May wait until fall for the actual surgery as she is planning to work at [employer] over the summer and will be required to carry a suitcase in and out of the building every day  -     GENERAL SURG ADULT REFERRAL    Essential hypertension with goal blood pressure less than 140/90  See the note above  -     lisinopril (PRINIVIL/ZESTRIL) 10 MG tablet; Take 1 tablet (10 mg) by mouth daily( dose is increased from 5 mg to 10 mg )    Other orders  -     DEPRESSION ACTION PLAN (DAP)      Follow up in 4 months  Patient was advised to seek sooner medical attention if there is any new or worsening symptoms  The total visit time was 40 minutes more  than 50% was spent in counseling and coordination of care as discussed above.    The information in this document, created by the medical scribe for me, accurately reflects the services I personally performed and the decisions made by me. I have reviewed and approved this document for accuracy prior to leaving the patient care area.  Maryan Churchill MD  2:07 PM, 04/11/17    Maryan Churchill MD  [facility]

## 2017-04-11 NOTE — LETTER
My Depression Action Plan  Name: Moira Andre   Date of Birth 9/24/1933  Date: 4/11/2017    My doctor: Maryan Churchill   My clinic: [place] [address] [facility] [address] [phone]          GREEN    ZONE   Good Control    What it looks like:     Things are going generally well. You have normal up s and down s. You may even feel depressed from time to time, but bad moods usually last less than a day.   What you need to do:  1. Continue to care for yourself (see self care plan)  2. Check your depression survival kit and update it as needed  3. Follow your physician s recommendations including any medication.  4. Do not stop taking medication unless you consult with your physician first.           YELLOW         ZONE Getting Worse    What it looks like:     Depression is starting to interfere with your life.     It may be hard to get out of bed; you may be starting to isolate yourself from others.    Symptoms of depression are starting to last most all day and this has happened for several days.     You may have suicidal thoughts but they are not constant.   What you need to do:     1. Call your care team, your response to treatment will improve if you keep your care team informed of your progress. Yellow periods are signs an adjustment may need to be made.     2. Continue your self-care, even if you have to fake it!    3. Talk to someone in your support network    4. Open up your depression survival kit           RED    ZONE Medical Alert - Get Help    What it looks like:     Depression is seriously interfering with your life.     You may experience these or other symptoms: You can t get out of bed most days, can t work or engage in other necessary activities, you have trouble taking care of basic hygiene, or basic responsibilities, thoughts of suicide or death that will not go away, self-injurious behavior.     What you need to do:  1. Call your care team and request  a same-day appointment. If they are not available (weekends or after hours) call your local crisis line, emergency room or 911.      Electronically signed by: Maryan Churchill, April 11, 2017    Depression Self Care Plan / Survival Kit    Self-Care for Depression  Here s the deal. Your body and mind are really not as separate as most people think.  What you do and think affects how you feel and how you feel influences what you do and think. This means if you do things that people who feel good do, it will help you feel better.  Sometimes this is all it takes.  There is also a place for medication and therapy depending on how severe your depression is, so be sure to consult with your medical provider and/ or Behavioral Health Consultant if your symptoms are worsening or not improving.     In order to better manage my stress, I will:    Exercise  Get some form of exercise, every day. This will help reduce pain and release endorphins, the  feel good  chemicals in your brain. This is almost as good as taking antidepressants!  This is not the same as joining a gym and then never going! (they count on that by the way ) It can be as simple as just going for a walk or doing some gardening, anything that will get you moving.      Hygiene   Maintain good hygiene (Get out of bed in the morning, Make your bed, Brush your teeth, Take a shower, and Get dressed like you were going to work, even if you are unemployed).  If your clothes don't fit try to get ones that do.    Diet  I will strive to eat foods that are good for me, drink plenty of water, and avoid excessive sugar, caffeine, alcohol, and other mood-altering substances.  Some foods that are helpful in depression are: complex carbohydrates, B vitamins, flaxseed, fish or fish oil, fresh fruits and vegetables.    Psychotherapy  I agree to participate in Individual Therapy (if recommended).    Medication  If prescribed medications, I agree to take them.  Missing doses can  result in serious side effects.  I understand that drinking alcohol, or other illicit drug use, may cause potential side effects.  I will not stop my medication abruptly without first discussing it with my provider.    Staying Connected With Others  I will stay in touch with my friends, family members, and my primary care provider/team.    Use your imagination  Be creative.  We all have a creative side; it doesn t matter if it s oil painting, sand castles, or mud pies! This will also kick up the endorphins.    Witness Beauty  (AKA stop and smell the roses) Take a look outside, even in mid-winter. Notice colors, textures. Watch the squirrels and birds.     Service to others  Be of service to others.  There is always someone else in need.  By helping others we can  get out of ourselves  and remember the really important things.  This also provides opportunities for practicing all the other parts of the program.    Humor  Laugh and be silly!  Adjust your TV habits for less news and crime-drama and more comedy.    Control your stress  Try breathing deep, massage therapy, biofeedback, and meditation. Find time to relax each day.     My support system    Clinic Contact:  Phone number:    Contact 1:  Phone number:    Contact 2:  Phone number:    Confucianism/:  Phone number:    Therapist:  Phone number:    Local crisis center:    Phone number:    Other community support:  Phone number:

## 2017-04-11 NOTE — MR AVS SNAPSHOT
After Visit Summary   4/11/2017    Moira Andre    MRN: [id number]           Patient Information     Date Of Birth          9/24/1933        Visit Information        Provider Department      4/11/2017 11:00 AM Maryan Churchill MD [facility]        Today's Diagnoses     Type 2 diabetes mellitus with diabetic nephropathy, without long-term current use of insulin (H)    -  1    Major depressive disorder, recurrent episode, moderate (H)        Intermittent asthma, uncomplicated        Essential hypertension        Multiple joint pain        Parotid gland enlargement        Ventral hernia without obstruction or gangrene        Essential hypertension with goal blood pressure less than 140/90          Care Instructions    Please call [facility] to schedule your parotid ultrasound  [phone]  Make appointment with rheumatology  Make appointment with general surgery  Labs today  The dose of lisinopril is increased.  Follow up in 4 months  Seek sooner medical attention if there is any worsening of symptoms or problems.              Follow-ups after your visit        Additional Services     GENERAL SURG ADULT REFERRAL       Your provider has referred you to: JUD: [facility] [place] [phone]   [email or web address]    Please be aware that coverage of these services is subject to the terms and limitations of your health insurance plan.  Call member services at your health plan with any benefit or coverage questions.      Please bring the following with you to your appointment:    (1) Any X-Rays, CTs or MRIs which have been performed.  Contact the facility where they were done to arrange for  prior to your scheduled appointment.   (2) List of current medications   (3) This referral request   (4) Any documents/labs given to you for this referral            RHEUMATOLOGY REFERRAL       Your provider has referred you to: [facility] [phone]   [email or web address]    Please be aware that coverage of these services is subject to the terms and limitations of your health insurance plan.  Call member services at your health plan with any benefit or coverage questions.      Please bring the following with you to your appointment:    (1) Any X-Rays, CTs or MRIs which have been performed.  Contact the facility where they were done to arrange for  prior to your scheduled appointment.    (2) List of current medications   (3) This referral request   (4) Any documents/labs given to you for this referral                  Future tests that were ordered for you today     Open Future Orders        Priority Expected Expires Ordered    US Parotid Routine  4/11/2018 4/11/2017            Who to contact     If you have questions or need follow up information about today's clinic visit or your schedule please contact [facility] directly at [phone].  Normal or non-critical lab and imaging results will be communicated to you by MyChart, letter or phone within 4 business days after the clinic has received the results. If you do not hear from us within 7 days, please contact the clinic through [employer] or phone. If you have a critical or abnormal lab result, we will notify you by phone as soon as possible.  Submit refill requests through [employer] or call your pharmacy and they will forward the refill request to us. Please allow 3 business days for your refill to be completed.          Additional Information About Your Visit        [employer] Information     [employer] lets you send messages to your doctor, view your test results, renew your prescriptions, schedule appointments and more. To sign up, go to [email or web address] . Click on \"Log in\" on the left side of the screen, which will take you to the Welcome page. Then click on \"Sign up Now\" on the right side of the page.     You will be asked to " "enter the access code listed below, as well as some personal information. Please follow the directions to create your username and password.     Your access code is: [email or web address]  Expires: 2017 11:38 AM     Your access code will  in 90 days. If you need help or a new code, please call your [place] clinic or [phone].        Care EveryWhere ID     This is your Care EveryWhere ID. This could be used by other organizations to access your [place] medical records  [id number]        Your Vitals Were     Pulse Temperature Respirations Height Pulse Oximetry BMI (Body Mass Index)    102 96.6  F (35.9  C) (Oral) 20 5' 7\" (1.702 m) 97% 43.85 kg/m2       Blood Pressure from Last 3 Encounters:   17 139/70   17 136/76   17 161/89    Weight from Last 3 Encounters:   17 280 lb (127 kg)   17 276 lb (125.2 kg)   17 278 lb 4.8 oz (126.2 kg)              We Performed the Following     Asthma Action Plan (AAP)     DEPRESSION ACTION PLAN (DAP)     GENERAL SURG ADULT REFERRAL     RHEUMATOLOGY REFERRAL          Today's Medication Changes          These changes are accurate as of: 17 11:25 AM.  If you have any questions, ask your nurse or doctor.               These medicines have changed or have updated prescriptions.        Dose/Directions    lisinopril 10 MG tablet   Commonly known as:  PRINIVIL/ZESTRIL   This may have changed:    - medication strength  - how much to take   Used for:  Essential hypertension with goal blood pressure less than 140/90        Dose:  10 mg   Take 1 tablet (10 mg) by mouth daily   Quantity:  90 tablet   Refills:  1            Where to get your medicines      These medications were sent to [facility] - [place], [place] - [address]   [address], [facility] [address]     Phone:  [phone]     lisinopril 10 MG tablet         Some of these will need a paper prescription and others can be bought over the counter.  Ask your " nurse if you have questions.     Bring a paper prescription for each of these medications     HYDROcodone-acetaminophen 5-325 MG per tablet                Primary Care Provider Office Phone # Fax #    Maryan Churchill [phone]       [facility]    [address]  [facility] [address]        Thank you!     Thank you for choosing [facility]  for your care. Our goal is always to provide you with excellent care. Hearing back from our patients is one way we can continue to improve our services. Please take a few minutes to complete the written survey that you may receive in the mail after your visit with us. Thank you!             Your Updated Medication List - Protect others around you: Learn how to safely use, store and throw away your medicines at www.disposemymeds.org.          This list is accurate as of: 4/11/17 11:25 AM.  Always use your most recent med list.                   Brand Name Dispense Instructions for use    acetaminophen 325 MG tablet    TYLENOL     Take 325-650 mg by mouth every 6 hours as needed for mild pain       ADVAIR DISKUS 100-50 MCG/DOSE diskus inhaler   Generic drug:  fluticasone-salmeterol     60 Inhaler    INHALE 1 PUFF EVERY 12 HOURS       albuterol 108 (90 BASE) MCG/ACT Inhaler    PROAIR HFA/PROVENTIL HFA/VENTOLIN HFA    3 Inhaler    Inhale 2 puffs into the lungs every 6 hours as needed for shortness of breath / dyspnea       atorvastatin 20 MG tablet    LIPITOR    90 tablet    Take 1 tablet (20 mg) by mouth daily       blood glucose monitoring meter device kit    no brand specified    1 kit    Use to test blood sugar 1-2 times daily or as directed.       blood glucose monitoring test strip    no brand specified    100 each    Use to test blood sugars 1-2 times daily or as directed       calcium-vitamin D 600-400 MG-UNIT per tablet    CALTRATE     Take 1 tablet by mouth 2 times daily       clopidogrel 75 MG tablet    PLAVIX     90 tablet    Take 1 tablet (75 mg) by mouth daily       DULoxetine 30 MG EC capsule    CYMBALTA    90 capsule    Take 1 capsule (30 mg) by mouth daily       FEXOFENADINE HCL PO      Take 180 mg by mouth daily.       FLONASE 50 MCG/ACT spray   Generic drug:  fluticasone      Spray 1 spray into both nostrils daily       Glucosamine HCl 750 MG Tabs      Take 1 tablet by mouth 2 times daily       HAIR/SKIN/NAILS Tabs      Take 1 tablet by mouth daily.       HYDROcodone-acetaminophen 5-325 MG per tablet    NORCO    90 tablet    Take 1 tablet by mouth every 8 hours as needed for moderate to severe pain       IBANdronate 150 MG tablet    BONIVA    3 tablet    Take 1 tablet (150 mg) by mouth every 30 days       lisinopril 10 MG tablet    PRINIVIL/ZESTRIL    90 tablet    Take 1 tablet (10 mg) by mouth daily       metoprolol 100 MG 24 hr tablet    TOPROL-XL    90 tablet    Take 1 tablet (100 mg) by mouth daily       NITROQUICK SL      Place 0.4 mg under the tongue every 5 minutes as needed       nystatin-triamcinolone cream    MYCOLOG II    60 g    Apply topically 2 times daily       order for DME      DREAMSTATION 5-15 CM/H20 NASAL WISP FABRIC       TRUEPLUS LANCETS 28G Misc     100 each    1 each 2 times daily as needed       TURMERIC PO          vitamin D 2000 UNITS Caps      Take by mouth daily

## 2017-04-11 NOTE — LETTER
My Asthma Action Plan  Name: Moira Andre   YOB: 1933  Date: 4/11/2017   My doctor: Maryan Churchill MD   My clinic: [facility]        My Control Medicine: advair  My Rescue Medicine: albuterol   My Asthma Severity: intermittent  Avoid your asthma triggers: cold air and exercise               GREEN ZONE     Good Control    I feel good    No cough or wheeze    Can work, sleep and play without asthma symptoms       Take your asthma control medicine every day.     1. If exercise triggers your asthma, take your rescue medication    15 minutes before exercise or sports, and    During exercise if you have asthma symptoms  2. Spacer to use with inhaler: If you have a spacer, make sure to use it with your inhaler             YELLOW ZONE     Getting Worse  I have ANY of these:    I do not feel good    Cough or wheeze    Chest feels tight    Wake up at night   1. Keep taking your Green Zone medications  2. Start taking your rescue medicine:    every 20 minutes for up to 1 hour. Then every 4 hours for 24-48 hours.  3. If you stay in the Yellow Zone for more than 12-24 hours, contact your doctor.  4. If you do not return to the Green Zone in 12-24 hours or you get worse, start taking your oral steroid medicine if prescribed by your provider.           RED ZONE     Medical Alert - Get Help  I have ANY of these:    I feel awful    Medicine is not helping    Breathing getting harder    Trouble walking or talking    Nose opens wide to breathe       1. Take your rescue medicine NOW  2. If your provider has prescribed an oral steroid medicine, start taking it NOW  3. Call your doctor NOW  4. If you are still in the Red Zone after 20 minutes and you have not reached your doctor:    Take your rescue medicine again and    Call 911 or go to the emergency room right away    See your regular doctor within 2 weeks of an Emergency Room or Urgent Care visit for follow-up treatment.        Electronically signed  by: Maryan Churchill, April 11, 2017    Annual Reminders:  Meet with Asthma Educator,  Flu Shot in the Fall, consider Pneumonia Vaccination for patients with asthma (aged 19 and older).    Pharmacy:    [id number] [address]  [facility]  [facility] - [place], [place] - [address]  WRITTEN PRESCRIPTION REQUESTED                    Asthma Triggers  How To Control Things That Make Your Asthma Worse    Triggers are things that make your asthma worse.  Look at the list below to help you find your triggers and what you can do about them.  You can help prevent asthma flare-ups by staying away from your triggers.      Trigger                                                          What you can do   Cigarette Smoke  Tobacco smoke can make asthma worse. Do not allow smoking in your home, car or around you.  Be sure no one smokes at a child s day care or school.  If you smoke, ask your health care provider for ways to help you quit.  Ask family members to quit too.  Ask your health care provider for a referral to Quit Plan to help you quit smoking, or call 8-317-280-PLAN.     Colds, Flu, Bronchitis  These are common triggers of asthma. Wash your hands often.  Don t touch your eyes, nose or mouth.  Get a flu shot every year.     Dust Mites  These are tiny bugs that live in cloth or carpet. They are too small to see. Wash sheets and blankets in hot water every week.   Encase pillows and mattress in dust mite proof covers.  Avoid having carpet if you can. If you have carpet, vacuum weekly.   Use a dust mask and HEPA vacuum.   Pollen and Outdoor Mold  Some people are allergic to trees, grass, or weed pollen, or molds. Try to keep your windows closed.  Limit time out doors when pollen count is high.   Ask you health care provider about taking medicine during allergy season.     Animal Dander  Some people are allergic to skin flakes, urine or  saliva from pets with fur or feathers. Keep pets with fur or feathers out of your home.    If you can t keep the pet outdoors, then keep the pet out of your bedroom.  Keep the bedroom door closed.  Keep pets off cloth furniture and away from stuffed toys.     Mice, Rats, and Cockroaches  Some people are allergic to the waste from these pests.   Cover food and garbage.  Clean up spills and food crumbs.  Store grease in the refrigerator.   Keep food out of the bedroom.   Indoor Mold  This can be a trigger if your home has high moisture. Fix leaking faucets, pipes, or other sources of water.   Clean moldy surfaces.  Dehumidify basement if it is damp and smelly.   Smoke, Strong Odors, and Sprays  These can reduce air quality. Stay away from strong odors and sprays, such as perfume, powder, hair spray, paints, smoke incense, paint, cleaning products, candles and new carpet.   Exercise or Sports  Some people with asthma have this trigger. Be active!  Ask your doctor about taking medicine before sports or exercise to prevent symptoms.    Warm up for 5-10 minutes before and after sports or exercise.     Other Triggers of Asthma  Cold air:  Cover your nose and mouth with a scarf.  Sometimes laughing or crying can be a trigger.  Some medicines and food can trigger asthma.

## 2017-04-12 LAB
ALBUMIN SERPL-MCNC: 3.6 G/DL (ref 3.4–5)
ALP SERPL-CCNC: 65 U/L (ref 40–150)
ALT SERPL W P-5'-P-CCNC: 22 U/L (ref 0–50)
ANION GAP SERPL CALCULATED.3IONS-SCNC: 13 MMOL/L (ref 3–14)
AST SERPL W P-5'-P-CCNC: 15 U/L (ref 0–45)
BILIRUB SERPL-MCNC: 0.3 MG/DL (ref 0.2–1.3)
BUN SERPL-MCNC: 29 MG/DL (ref 7–30)
CALCIUM SERPL-MCNC: 8.5 MG/DL (ref 8.5–10.1)
CHLORIDE SERPL-SCNC: 107 MMOL/L (ref 94–109)
CHOLEST SERPL-MCNC: 154 MG/DL
CO2 SERPL-SCNC: 20 MMOL/L (ref 20–32)
CREAT SERPL-MCNC: 1.27 MG/DL (ref 0.52–1.04)
GFR SERPL CREATININE-BSD FRML MDRD: 40 ML/MIN/1.7M2
GLUCOSE SERPL-MCNC: 171 MG/DL (ref 70–99)
HDLC SERPL-MCNC: 50 MG/DL
LDLC SERPL CALC-MCNC: 55 MG/DL
NONHDLC SERPL-MCNC: 104 MG/DL
POTASSIUM SERPL-SCNC: 4.3 MMOL/L (ref 3.4–5.3)
PROT SERPL-MCNC: 7.1 G/DL (ref 6.8–8.8)
SODIUM SERPL-SCNC: 140 MMOL/L (ref 133–144)
TRIGL SERPL-MCNC: 244 MG/DL

## 2017-04-12 ASSESSMENT — PATIENT HEALTH QUESTIONNAIRE - PHQ9: SUM OF ALL RESPONSES TO PHQ QUESTIONS 1-9: 8

## 2017-04-12 NOTE — PROGRESS NOTES
Please Notify the patient  Your chronic kidney disease is stable.  Your total cholesterol is normal.  HDL which is called good cholesterol is normal.  Your LDL cholesterol is normal.  This is often call bad cholesterol and high levels increase the risk for heart attacks and strokes.  The triglycerides are high. Lowering  the amount of sugar ,alcohol and sweets in the diet helps to control this.Exercise and weight loss helps.  HbA1c which is average glucose during last 3 months is 6.7%.  Lab Results       Component                Value               Date                       A1C                      6.7                 04/11/2017                 A1C                      6.7                 12/16/2016                 A1C                      6.7                 09/16/2016                 A1C                      7.0                 06/28/2016                 A1C                      6.3                 02/09/2016            Eat low cholesterol low fat  diet and do regular physical activity. Avoid high sugar containing food.  Please send the copy of lab results also to this patient.

## 2017-04-13 ENCOUNTER — HOSPITAL ENCOUNTER (OUTPATIENT)
Dept: ULTRASOUND IMAGING | Facility: CLINIC | Age: 82
Discharge: HOME | End: 2017-04-13
Attending: INTERNAL MEDICINE | Admitting: INTERNAL MEDICINE
Payer: COMMERCIAL

## 2017-04-13 DIAGNOSIS — K11.1 PAROTID GLAND ENLARGEMENT: ICD-10-CM

## 2017-04-13 PROCEDURE — 76536 US EXAM OF HEAD AND NECK: CPT

## 2017-04-14 NOTE — PROGRESS NOTES
Please Notify the patient  Parotid ultrasound result is normal   So most likely prominence is due fat pad.  Follow up if problem persists.

## 2017-05-02 ENCOUNTER — TELEPHONE (OUTPATIENT)
Dept: CARDIOLOGY | Facility: CLINIC | Age: 82
End: 2017-05-02

## 2017-05-02 NOTE — TELEPHONE ENCOUNTER
Call from patient w/ request for plavix hold for spinal injection - 5 days prior and 2 days post -total 7 days.  Hold approved in July and October 2017 for injections with resuming of plavix post injection in 2 days and/or when safe to restart.    Informed Pt she has knee replacements and should find out from ortho if needs SBE as advised in the past.  Last seen by Dr Sinha 5/2016 with plan for 2 year follow up.  Hx CAD w/ Bare metal stent 9/2007 to OM1 and Lusby stent to diagonal; Heart catheterization 2013 patent stents.  Patient denies CVA, TIA or stroke symptoms at this time.  On review of prior visit notes with Dr Sinha - noted: She is still on Plavix as she has aspirin intolerance and a history of a TIA.  Placed in Dr Sinha inbox for view.  Jeni Mccormack RN     415 pm Reviewed with Dr Sinha - ok to hold plavix as requested for spinal injection (see above).               Called patient and informed.     Jeni Mccormack RN

## 2017-05-17 ENCOUNTER — OFFICE VISIT (OUTPATIENT)
Dept: FAMILY MEDICINE | Facility: CLINIC | Age: 82
End: 2017-05-17
Payer: COMMERCIAL

## 2017-05-17 VITALS
WEIGHT: 275 LBS | HEIGHT: 67 IN | BODY MASS INDEX: 43.16 KG/M2 | HEART RATE: 74 BPM | OXYGEN SATURATION: 93 % | RESPIRATION RATE: 20 BRPM | DIASTOLIC BLOOD PRESSURE: 77 MMHG | TEMPERATURE: 97 F | SYSTOLIC BLOOD PRESSURE: 137 MMHG

## 2017-05-17 DIAGNOSIS — J06.9 VIRAL URI WITH COUGH: Primary | ICD-10-CM

## 2017-05-17 PROCEDURE — 99213 OFFICE O/P EST LOW 20 MIN: CPT | Performed by: NURSE PRACTITIONER

## 2017-05-17 NOTE — PATIENT INSTRUCTIONS
Take the flonase daily  Push fluids   Take tylenol 1000 mg 3 times a day as needed to help with the pain

## 2017-05-17 NOTE — MR AVS SNAPSHOT
After Visit Summary   5/17/2017    Moira Andre    MRN: [id number]           Patient Information     Date Of Birth          9/24/1933        Visit Information        Provider Department      5/17/2017 10:30 AM Justine Mckeon APRN CNP [facility]        Today's Diagnoses     Viral URI with cough    -  1      Care Instructions    Take the flonase daily  Push fluids   Take tylenol 1000 mg 3 times a day as needed to help with the pain             Follow-ups after your visit        Your next 10 appointments already scheduled     Aug 15, 2017  9:00 AM CDT   Office Visit with Maryan Churchill MD   [facility]    [address] [facility] [address] [phone]           Bring a current list of meds and any records pertaining to this visit.  For Physicals, please bring immunization records and any forms needing to be filled out.  Please arrive 10 minutes early to complete paperwork.              Who to contact     If you have questions or need follow up information about today's clinic visit or your schedule please contact [facility] directly at [phone].  Normal or non-critical lab and imaging results will be communicated to you by MyChart, letter or phone within 4 business days after the clinic has received the results. If you do not hear from us within 7 days, please contact the clinic through [employer] or phone. If you have a critical or abnormal lab result, we will notify you by phone as soon as possible.  Submit refill requests through [employer] or call your pharmacy and they will forward the refill request to us. Please allow 3 business days for your refill to be completed.          Additional Information About Your Visit        MyChart Information     [employer] lets you send messages to your doctor, view your test results, renew your prescriptions, schedule appointments and more. To sign up, go to [email or web address] .  "Click on \"Log in\" on the left side of the screen, which will take you to the Welcome page. Then click on \"Sign up Now\" on the right side of the page.     You will be asked to enter the access code listed below, as well as some personal information. Please follow the directions to create your username and password.     Your access code is: [email or web address]  Expires: 2017 11:38 AM     Your access code will  in 90 days. If you need help or a new code, please call your [place] clinic or [phone].        Care EveryWhere ID     This is your Care EveryWhere ID. This could be used by other organizations to access your [place] medical records  [id number]        Your Vitals Were     Pulse Temperature Respirations Height Pulse Oximetry BMI (Body Mass Index)    74 97  F (36.1  C) (Tympanic) 20 5' 7\" (1.702 m) 93% 43.07 kg/m2       Blood Pressure from Last 3 Encounters:   17 137/77   17 139/70   17 136/76    Weight from Last 3 Encounters:   17 275 lb (124.7 kg)   17 280 lb (127 kg)   17 276 lb (125.2 kg)              Today, you had the following     No orders found for display       Primary Care Provider Office Phone # Fax #    Maryan BRIGIDA Churchill [phone]       [facility]    [address] [facility] [address]  [facility] [address]        Thank you!     Thank you for choosing [facility]  for your care. Our goal is always to provide you with excellent care. Hearing back from our patients is one way we can continue to improve our services. Please take a few minutes to complete the written survey that you may receive in the mail after your visit with us. Thank you!             Your Updated Medication List - Protect others around you: Learn how to safely use, store and throw away your medicines at www.disposemymeds.org.          This list is accurate as of: 17 10:45 AM.  Always use your most recent med list.                   Brand Name " Dispense Instructions for use    acetaminophen 325 MG tablet    TYLENOL     Take 325-650 mg by mouth every 6 hours as needed for mild pain       ADVAIR DISKUS 100-50 MCG/DOSE diskus inhaler   Generic drug:  fluticasone-salmeterol     60 Inhaler    INHALE 1 PUFF EVERY 12 HOURS       albuterol 108 (90 BASE) MCG/ACT Inhaler    PROAIR HFA/PROVENTIL HFA/VENTOLIN HFA    3 Inhaler    Inhale 2 puffs into the lungs every 6 hours as needed for shortness of breath / dyspnea       atorvastatin 20 MG tablet    LIPITOR    90 tablet    Take 1 tablet (20 mg) by mouth daily       blood glucose monitoring meter device kit    no brand specified    1 kit    Use to test blood sugar 1-2 times daily or as directed.       blood glucose monitoring test strip    no brand specified    100 each    Use to test blood sugars 1-2 times daily or as directed       calcium-vitamin D 600-400 MG-UNIT per tablet    CALTRATE     Take 1 tablet by mouth 2 times daily       clopidogrel 75 MG tablet    PLAVIX    90 tablet    Take 1 tablet (75 mg) by mouth daily       DULoxetine 30 MG EC capsule    CYMBALTA    90 capsule    Take 1 capsule (30 mg) by mouth daily       FEXOFENADINE HCL PO      Take 180 mg by mouth daily.       FLONASE 50 MCG/ACT spray   Generic drug:  fluticasone      Spray 1 spray into both nostrils daily       Glucosamine HCl 750 MG Tabs      Take 1 tablet by mouth 2 times daily       HAIR/SKIN/NAILS Tabs      Take 1 tablet by mouth daily.       HYDROcodone-acetaminophen 5-325 MG per tablet    NORCO    90 tablet    Take 1 tablet by mouth every 8 hours as needed for moderate to severe pain       IBANdronate 150 MG tablet    BONIVA    3 tablet    Take 1 tablet (150 mg) by mouth every 30 days       lisinopril 10 MG tablet    PRINIVIL/ZESTRIL    90 tablet    Take 1 tablet (10 mg) by mouth daily       metoprolol 100 MG 24 hr tablet    TOPROL-XL    90 tablet    Take 1 tablet (100 mg) by mouth daily       NITROQUICK SL      Place 0.4 mg under the  tongue every 5 minutes as needed       nystatin-triamcinolone cream    MYCOLOG II    60 g    Apply topically 2 times daily       order for DME      DREAMSTATION 5-15 CM/H20 NASAL WISP FABRIC       TRUEPLUS LANCETS 28G Misc     100 each    1 each 2 times daily as needed       TURMERIC PO          vitamin D 2000 UNITS Caps      Take by mouth daily

## 2017-05-17 NOTE — PROGRESS NOTES
HPI      SUBJECTIVE:                                                    Moira Andre is a 83 year old female who presents to clinic today for the following health issues:    Ear pain      Duration: yesterday    Description (location/character/radiation): Left ear-sharp pain    Intensity:  moderate    Accompanying signs and symptoms: SOB; Wheezing, Ear pain-left, Ringing in ears-Many years; no sore throat or jaw pain    History (similar episodes/previous evaluation): Seen in Urgent Care 3 times    Precipitating or alleviating factors: None    Therapies tried and outcome:  Ear drops from previous infection        1 week ago started feeling tired then got laryngitis, runny nose and cough   Yesterday earache started. When she pushes a certain area, the pain was worse. During the night certain movements made the pain worse  Had AOM a couple months ago   No fevers   No pain with jaw movement   Now the pain is pretty much gone   Mild cough   Has flonase but hasn't really used it   Hasn't had a cold in several years so thinks this is hitting her harder than normal  It is making her depressed as well   Had lumbar injection recently that has helped her a lot       Past Medical History:   Diagnosis Date     Aortic valve sclerosis     heart murmur, no AS     Arrhythmia     PAT, PVC     Aspirin allergy     Plavix use long term     Asthma      CKD (chronic kidney disease) stage 3, GFR 30-59 ml/min     x 2007 atleast     Congestive heart failure, unspecified      Depression      Diabetes mellitus (H) 2010     Diastolic dysfunction, left ventricle 2013    grade 2, nl ef     HTN (hypertension)      Migraine headache      Myocardial infarction (H) 9/2007, cath 2013 ml    BMS: stent to OM, diag, nl EF, echo /C angia 2013 , f/u cath no lesion >40%     OA (osteoarthritis) of knee      Obesity      Rheumatoid arthritis flare (H)     prednisone     Sleep apnea     on CPAP (not using 2016)     TIA (transient ischaemic attack)       Ventral hernia, unspecified, without mention of obstruction or gangrene      Past Surgical History:   Procedure Laterality Date     APPENDECTOMY       BIOPSY BREAST      x2 -needle & lumpectomy-benign     CHOLECYSTECTOMY       CORONARY ANGIOGRAPHY ADULT ORDER  9/28/2007    Bare metal stent to OM1, Diagonal patent      CORONARY ANGIOGRAPHY ADULT ORDER  9/25/2007    Capron stent to Diagonal     HC LEFT HEART CATHETERIZATION  8/2013    Moderate CAD     HYSTERECTOMY TOTAL ABDOMINAL       ORTHOPEDIC SURGERY      knee replacement on right side (2006), Left side (2016)     RELEASE CARPAL TUNNEL      right and left     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     Social History   Substance Use Topics     Smoking status: Never Smoker     Smokeless tobacco: Never Used     Alcohol use 0.0 - 0.6 oz/week     0 - 1 Standard drinks or equivalent per week      Comment: rarely     Current Outpatient Prescriptions   Medication Sig Dispense Refill     TURMERIC PO        lisinopril (PRINIVIL/ZESTRIL) 10 MG tablet Take 1 tablet (10 mg) by mouth daily 90 tablet 1     HYDROcodone-acetaminophen (NORCO) 5-325 MG per tablet Take 1 tablet by mouth every 8 hours as needed for moderate to severe pain 90 tablet 0     nystatin-triamcinolone (MYCOLOG II) cream Apply topically 2 times daily 60 g 1     IBANdronate (BONIVA) 150 MG tablet Take 1 tablet (150 mg) by mouth every 30 days 3 tablet 3     ADVAIR DISKUS 100-50 MCG/DOSE diskus inhaler INHALE 1 PUFF EVERY 12 HOURS 60 Inhaler 3     DULoxetine (CYMBALTA) 30 MG capsule Take 1 capsule (30 mg) by mouth daily 90 capsule 1     metoprolol (TOPROL-XL) 100 MG 24 hr tablet Take 1 tablet (100 mg) by mouth daily 90 tablet 3     clopidogrel (PLAVIX) 75 MG tablet Take 1 tablet (75 mg) by mouth daily 90 tablet 3     atorvastatin (LIPITOR) 20 MG tablet Take 1 tablet (20 mg) by mouth daily 90 tablet 3     order for DME DREAMSTATION  5-15 CM/H20  NASAL WISP FABRIC       blood glucose monitoring (NO  "BRAND SPECIFIED) meter device kit Use to test blood sugar 1-2 times daily or as directed. 1 kit 0     blood glucose monitoring (NO BRAND SPECIFIED) test strip Use to test blood sugars 1-2 times daily or as directed 100 each 3     TRUEPLUS LANCETS 28G MISC 1 each 2 times daily as needed 100 each 3     acetaminophen (TYLENOL) 325 MG tablet Take 325-650 mg by mouth every 6 hours as needed for mild pain       Glucosamine HCl 750 MG TABS Take 1 tablet by mouth 2 times daily       Cholecalciferol (VITAMIN D) 2000 UNITS CAPS Take by mouth daily       calcium-vitamin D (CALTRATE) 600-400 MG-UNIT per tablet Take 1 tablet by mouth 2 times daily       fluticasone (FLONASE) 50 MCG/ACT nasal spray Spray 1 spray into both nostrils daily       albuterol (PROAIR HFA, PROVENTIL HFA, VENTOLIN HFA) 108 (90 BASE) MCG/ACT inhaler Inhale 2 puffs into the lungs every 6 hours as needed for shortness of breath / dyspnea 3 Inhaler 3     Multiple Vitamins-Minerals (HAIR/SKIN/NAILS) TABS Take 1 tablet by mouth daily.        FEXOFENADINE HCL PO Take 180 mg by mouth daily.       Nitroglycerin (NITROQUICK SL) Place 0.4 mg under the tongue every 5 minutes as needed        Allergies   Allergen Reactions     Aspirin Hives     Reaction occurred during childhood.      Minocycline      Yellow Dye Allergy. Minocycline has Yellow Dye #10.     Yellow Dye Hives     Rxn to yellow tablet. Eyes swelled shut.        Reviewed PMH, med list and allergies.      ROS  Detailed as above       /77 (BP Location: Right arm, Patient Position: Chair, Cuff Size: Adult Large)  Pulse 74  Temp 97  F (36.1  C) (Tympanic)  Resp 20  Ht 5' 7\" (1.702 m)  Wt 275 lb (124.7 kg)  SpO2 93%  BMI 43.07 kg/m2      Physical Exam   Constitutional: She is well-developed, well-nourished, and in no distress.   HENT:   Head: Normocephalic.   Right Ear: Tympanic membrane, external ear and ear canal normal. No tenderness. No mastoid tenderness.   Left Ear: Tympanic membrane, " external ear and ear canal normal. No tenderness. No mastoid tenderness.   Mouth/Throat: Oropharynx is clear and moist. No oropharyngeal exudate.   Eyes: Conjunctivae are normal.   Neck: Normal range of motion.   Cardiovascular: Normal rate, regular rhythm and normal heart sounds.    No murmur heard.  Pulmonary/Chest: Effort normal and breath sounds normal. No respiratory distress.   Lymphadenopathy:     She has no cervical adenopathy.   Neurological: She is alert.   Skin: Skin is warm and dry.   Psychiatric: Mood normal. Her affect is labile.   Vitals reviewed.      Assessment and Plan:       ICD-10-CM    1. Viral URI with cough J06.9     B97.89        Left ear pain that is pretty much resolved today.   Global symptoms c/w viral etiology   Recommend Symptomatic treatments   Continue flonase and saline nasal spray   Tylenol for pain   Tried to encourage pt as she was labile, frequently tearful about being sick.   Call or rtc prn       YOHANNES Salazar, CNP  [facility]

## 2017-05-17 NOTE — NURSING NOTE
"Chief Complaint   Patient presents with     Ear Problem       Initial /77 (BP Location: Right arm, Patient Position: Chair, Cuff Size: Adult Large)  Pulse 74  Temp 97  F (36.1  C) (Tympanic)  Resp 20  Ht 5' 7\" (1.702 m)  Wt 275 lb (124.7 kg)  SpO2 93%  BMI 43.07 kg/m2 Estimated body mass index is 43.07 kg/(m^2) as calculated from the following:    Height as of this encounter: 5' 7\" (1.702 m).    Weight as of this encounter: 275 lb (124.7 kg).  Medication Reconciliation: complete   Cheryl Harris CMA (AAMA)      "

## 2017-05-30 ENCOUNTER — TELEPHONE (OUTPATIENT)
Dept: CARDIOLOGY | Facility: CLINIC | Age: 82
End: 2017-05-30

## 2017-05-30 NOTE — TELEPHONE ENCOUNTER
Pt calling she is having palpitations since having cold. Pt says cold is gone but palpitations remain. Pt also sounds SOB with minimal activity. Pt says she has had SOB for months, and is very inactive and tired. Pt goes on to say she has some depression also. Pt says she did see PMD who said her lungs sound clear.  Gave Pt and appointment to see DR Sinha, and informed her if she gets more palpitations and SOB she needs to go to [place]. DEB Thomas RN

## 2017-06-08 ENCOUNTER — OFFICE VISIT (OUTPATIENT)
Dept: CARDIOLOGY | Facility: CLINIC | Age: 82
End: 2017-06-08
Payer: COMMERCIAL

## 2017-06-08 VITALS
DIASTOLIC BLOOD PRESSURE: 70 MMHG | SYSTOLIC BLOOD PRESSURE: 130 MMHG | BODY MASS INDEX: 43 KG/M2 | HEART RATE: 84 BPM | WEIGHT: 274 LBS | HEIGHT: 67 IN

## 2017-06-08 DIAGNOSIS — R42 DIZZINESS: ICD-10-CM

## 2017-06-08 DIAGNOSIS — I25.10 CORONARY ARTERY DISEASE INVOLVING NATIVE CORONARY ARTERY OF NATIVE HEART, ANGINA PRESENCE UNSPECIFIED: Primary | ICD-10-CM

## 2017-06-08 PROCEDURE — 99215 OFFICE O/P EST HI 40 MIN: CPT | Performed by: INTERNAL MEDICINE

## 2017-06-08 NOTE — PROGRESS NOTES
HISTORY OF PRESENT ILLNESS:  I had the pleasure of following up on our mutual patient, Moira Andre.  She is a very pleasant 83-year-old woman.  She is actually not scheduled to see me until next year, but she came in early because she was very concerned about a new problem.  Her cardiac history has been previously outlined, but briefly, she had in 2007 bare metal stents to the obtuse marginal and diagonal.  At that time, her ejection fraction was normal.  In 2013, a question came up with a new problem.  Heart cath, fortunately, showed normal coronary arteries, the stents were open and there was no narrowing greater than 40%.  Her echocardiogram at that time showed ejection fraction of 55%-60% with mild LVH.  Right heart was normal.  There was no evidence of pulmonary hypertension.  The patient came in today because she became scared.  Apparently this past week she has had at least 2, if not more episodes where she will feel her heart pounding.  It is unclear to me if it was isolated flip-flops or racing, but she states the feeling went on for several minutes, which is more than I expect with isolated PVCs.  With this she became a little bit lightheaded.  She was sitting at the time and again she was driving during one of the episodes and decided that she should possibly pull over.  She did not pass out.  She has a remote history of what was probably PVCs, if I understand what she is saying.  She remembers being told by her other doctor to decrease the caffeine, and those symptoms got better.  Whether this is the same thing or not, she is not sure.  Also, she has noticed a generalized decline in her activities of daily living.  She states she gets breathless with lesser activity.  She is tired a lot, and it is not clear to me if she is having angina or not, but she states she has used some sublingual nitroglycerin this past year for some nonspecific chest feelings.  In 2013 when her angiogram did not show any  significant disease, it turned out it was actually acid reflux.      She does not have pleuritic chest pain.  She has no history of pulmonary embolus.  She is supposed to be using a sleep apnea machine, but she is not.  She is still using half-caffeinated products.  At this juncture, I am going to recommend the following.  I see that you did lab work 2 months ago which excludes electrolytes and lipids, and other than the triglycerides and blood sugar, they were normal.  I see that in 2016, a TSH and hemoglobin were checked, so the fatigue is probably not due to those.  I think I will go ahead and have her wear a 30-day cardiac event monitor so we can determine if there is an arrhythmia present that might account for her symptoms.  In addition, given that she has coronary disease and is telling me she is using nitroglycerin again, I think we can do a nuclear stress test and I will get a followup echocardiogram just to make sure there is no change in her heart function, to make sure she does not have pulmonary hypertension given that she has sleep apnea and is not using her CPAP machine anymore.  We will reconvene with her next month after we have had a chance to have her wear the 30-day event monitor.  I have made no change in her medications otherwise.  Thank you for allowing me to see Ms. Arriaga.  This was a 40 minute visit, greater than 50% counseling.      cc:   Maryan Churchill MD    [address], [address]    [place] [address]         ALAYNA CORREA MD             D: 2017 09:00   T: 2017 17:37   MT: UBALDO      Name:     JOSIE ARRIAGA   MRN:      [id number]        Account:      [id number]   :      1933           Service Date: 2017      Document: [id number]

## 2017-06-08 NOTE — LETTER
6/8/2017    Maryan Churchill MD  [address] [place] [address]    RE: Moira Andre       Dear Colleague,    I had the pleasure of seeing Moira Andre in the [facility].    I had the pleasure of following up on our mutual patient, Moira Andre.  She is a very pleasant 83-year-old woman.  She is actually not scheduled to see me until next year, but she came in early because she was very concerned about a new problem.  Her cardiac history has been previously outlined, but briefly, she had in 2007 bare metal stents to the obtuse marginal and diagonal.  At that time, her ejection fraction was normal.  In 2013, a question came up with a new problem.  Heart cath, fortunately, showed normal coronary arteries, the stents were open and there was no narrowing greater than 40%.  Her echocardiogram at that time showed ejection fraction of 55%-60% with mild LVH.  Right heart was normal.  There was no evidence of pulmonary hypertension.  The patient came in today because she became scared.  Apparently this past week she has had at least 2, if not more episodes where she will feel her heart pounding.  It is unclear to me if it was isolated flip-flops or racing, but she states the feeling went on for several minutes, which is more than I expect with isolated PVCs.  With this she became a little bit lightheaded.  She was sitting at the time and again she was driving during one of the episodes and decided that she should possibly pull over.  She did not pass out.  She has a remote history of what was probably PVCs, if I understand what she is saying.  She remembers being told by her other doctor to decrease the caffeine, and those symptoms got better.  Whether this is the same thing or not, she is not sure.  Also, she has noticed a generalized decline in her activities of daily living.  She states she gets breathless with lesser activity.  She is tired a lot, and it is not clear to me  if she is having angina or not, but she states she has used some sublingual nitroglycerin this past year for some nonspecific chest feelings.  In 2013 when her angiogram did not show any significant disease, it turned out it was actually acid reflux.      She does not have pleuritic chest pain.  She has no history of pulmonary embolus.  She is supposed to be using a sleep apnea machine, but she is not.  She is still using half-caffeinated products.  At this juncture, I am going to recommend the following.  I see that you did lab work 2 months ago which excludes electrolytes and lipids, and other than the triglycerides and blood sugar, they were normal.  I see that in 2016, a TSH and hemoglobin were checked, so the fatigue is probably not due to those.  I think I will go ahead and have her wear a 30-day cardiac event monitor so we can determine if there is an arrhythmia present that might account for her symptoms.  In addition, given that she has coronary disease and is telling me she is using nitroglycerin again, I think we can do a nuclear stress test and I will get a followup echocardiogram just to make sure there is no change in her heart function, to make sure she does not have pulmonary hypertension given that she has sleep apnea and is not using her CPAP machine anymore.  We will reconvene with her next month after we have had a chance to have her wear the 30-day event monitor.  I have made no change in her medications otherwise.  Thank you for allowing me to see Ms. Andre.  This was a 40 minute visit, greater than 50% counseling.     Again, thank you for allowing me to participate in the care of your patient.      Sincerely,    Jozef Sinha MD     [facility]

## 2017-06-08 NOTE — MR AVS SNAPSHOT
After Visit Summary   6/8/2017    Moira Andre    MRN: [id number]           Patient Information     Date Of Birth          9/24/1933        Visit Information        Provider Department      6/8/2017 8:15 AM Jozef Sinha MD [facility]         Follow-ups after your visit        Your next 10 appointments already scheduled     Jun 08, 2017  8:15 AM CDT   Return Visit with Jozef Sinha MD   [facility] ([facility])    [id number]  [facility] [id number] [phone]            Aug 15, 2017  9:00 AM CDT   [facility]    [address] [facility] [address] [phone]           Bring a current list of meds and any records pertaining to this visit.  For Physicals, please bring immunization records and any forms needing to be filled out.  Please arrive 10 minutes early to complete paperwork.              Who to contact     If you have questions or need follow up information about today's clinic visit or your schedule please contact [facility] directly at [phone].  Normal or non-critical lab and imaging results will be communicated to you by Weevehart, letter or phone within 4 business days after the clinic has received the results. If you do not hear from us within 7 days, please contact the clinic through Weevehart or phone. If you have a critical or abnormal lab result, we will notify you by phone as soon as possible.  Submit refill requests through Hotlist or call your pharmacy and they will forward the refill request to us. Please allow 3 business days for your refill to be completed.          Additional Information About Your Visit        Weevehart Information     Hotlist lets you send messages to your doctor, view your test results, renew your prescriptions,  "schedule appointments and more. To sign up, go to [email or web address] . Click on \"Log in\" on the left side of the screen, which will take you to the Welcome page. Then click on \"Sign up Now\" on the right side of the page.     You will be asked to enter the access code listed below, as well as some personal information. Please follow the directions to create your username and password.     Your access code is: [email or web address]  Expires: 2017 11:38 AM     Your access code will  in 90 days. If you need help or a new code, please call your [facility] clinic or [phone].        Care EveryWhere ID     This is your Care EveryWhere ID. This could be used by other organizations to access your [facility] medical records  [id number]         Blood Pressure from Last 3 Encounters:   17 137/77   17 139/70   17 136/76    Weight from Last 3 Encounters:   17 124.7 kg (275 lb)   17 127 kg (280 lb)   17 125.2 kg (276 lb)              Today, you had the following     No orders found for display       Primary Care Provider Office Phone # Fax #    Maryan Churchill [phone]       [facility]    [address] [facility] [address]  [facility] [address]        Thank you!     Thank you for choosing [facility] [place]  for your care. Our goal is always to provide you with excellent care. Hearing back from our patients is one way we can continue to improve our services. Please take a few minutes to complete the written survey that you may receive in the mail after your visit with us. Thank you!             Your Updated Medication List - Protect others around you: Learn how to safely use, store and throw away your medicines at www.disposemymeds.org.          This list is accurate as of: 17  1:11 PM.  Always use your most recent med list.                   Brand Name Dispense Instructions for use    acetaminophen 325 MG tablet    " TYLENOL     Take 325-650 mg by mouth every 6 hours as needed for mild pain       ADVAIR DISKUS 100-50 MCG/DOSE diskus inhaler   Generic drug:  fluticasone-salmeterol     60 Inhaler    INHALE 1 PUFF EVERY 12 HOURS       albuterol 108 (90 BASE) MCG/ACT Inhaler    PROAIR HFA/PROVENTIL HFA/VENTOLIN HFA    3 Inhaler    Inhale 2 puffs into the lungs every 6 hours as needed for shortness of breath / dyspnea       atorvastatin 20 MG tablet    LIPITOR    90 tablet    Take 1 tablet (20 mg) by mouth daily       blood glucose monitoring meter device kit    no brand specified    1 kit    Use to test blood sugar 1-2 times daily or as directed.       blood glucose monitoring test strip    no brand specified    100 each    Use to test blood sugars 1-2 times daily or as directed       calcium-vitamin D 600-400 MG-UNIT per tablet    CALTRATE     Take 1 tablet by mouth 2 times daily       clopidogrel 75 MG tablet    PLAVIX    90 tablet    Take 1 tablet (75 mg) by mouth daily       DULoxetine 30 MG EC capsule    CYMBALTA    90 capsule    Take 1 capsule (30 mg) by mouth daily       FEXOFENADINE HCL PO      Take 180 mg by mouth daily.       FLONASE 50 MCG/ACT spray   Generic drug:  fluticasone      Spray 1 spray into both nostrils daily       Glucosamine HCl 750 MG Tabs      Take 1 tablet by mouth 2 times daily       HAIR/SKIN/NAILS Tabs      Take 1 tablet by mouth daily.       HYDROcodone-acetaminophen 5-325 MG per tablet    NORCO    90 tablet    Take 1 tablet by mouth every 8 hours as needed for moderate to severe pain       IBANdronate 150 MG tablet    BONIVA    3 tablet    Take 1 tablet (150 mg) by mouth every 30 days       lisinopril 10 MG tablet    PRINIVIL/ZESTRIL    90 tablet    Take 1 tablet (10 mg) by mouth daily       metoprolol 100 MG 24 hr tablet    TOPROL-XL    90 tablet    Take 1 tablet (100 mg) by mouth daily       NITROQUICK SL      Place 0.4 mg under the tongue every 5 minutes as needed       nystatin-triamcinolone  cream    MYCOLOG II    60 g    Apply topically 2 times daily       order for DME      DREAMSTATION 5-15 CM/H20 NASAL WISP FABRIC       TRUEPLUS LANCETS 28G Misc     100 each    1 each 2 times daily as needed       TURMERIC PO          vitamin D 2000 UNITS Caps      Take by mouth daily

## 2017-06-08 NOTE — PROGRESS NOTES
HPI and Plan:   See dictation    Orders Placed This Encounter   Procedures     NM Lexiscan stress test (nuc card)     Follow-Up with Cardiac Advanced Practice Provider     Cardiac Event Monitor - Peds/Adult     Echocardiogram     No orders of the defined types were placed in this encounter.    There are no discontinued medications.      Encounter Diagnoses   Name Primary?     Coronary artery disease involving native coronary artery of native heart, angina presence unspecified Yes     Dizziness        CURRENT MEDICATIONS:  Current Outpatient Prescriptions   Medication Sig Dispense Refill     TURMERIC PO        lisinopril (PRINIVIL/ZESTRIL) 10 MG tablet Take 1 tablet (10 mg) by mouth daily 90 tablet 1     HYDROcodone-acetaminophen (NORCO) 5-325 MG per tablet Take 1 tablet by mouth every 8 hours as needed for moderate to severe pain 90 tablet 0     nystatin-triamcinolone (MYCOLOG II) cream Apply topically 2 times daily 60 g 1     IBANdronate (BONIVA) 150 MG tablet Take 1 tablet (150 mg) by mouth every 30 days 3 tablet 3     ADVAIR DISKUS 100-50 MCG/DOSE diskus inhaler INHALE 1 PUFF EVERY 12 HOURS 60 Inhaler 3     DULoxetine (CYMBALTA) 30 MG capsule Take 1 capsule (30 mg) by mouth daily 90 capsule 1     metoprolol (TOPROL-XL) 100 MG 24 hr tablet Take 1 tablet (100 mg) by mouth daily 90 tablet 3     clopidogrel (PLAVIX) 75 MG tablet Take 1 tablet (75 mg) by mouth daily 90 tablet 3     atorvastatin (LIPITOR) 20 MG tablet Take 1 tablet (20 mg) by mouth daily 90 tablet 3     order for DME DREAMSTATION  5-15 CM/H20  NASAL WISP FABRIC       blood glucose monitoring (NO BRAND SPECIFIED) meter device kit Use to test blood sugar 1-2 times daily or as directed. 1 kit 0     blood glucose monitoring (NO BRAND SPECIFIED) test strip Use to test blood sugars 1-2 times daily or as directed 100 each 3     TRUEPLUS LANCETS 28G MISC 1 each 2 times daily as needed 100 each 3     acetaminophen (TYLENOL) 325 MG tablet Take 325-650 mg by  mouth every 6 hours as needed for mild pain       Glucosamine HCl 750 MG TABS Take 1 tablet by mouth 2 times daily       Cholecalciferol (VITAMIN D) 2000 UNITS CAPS Take by mouth daily       calcium-vitamin D (CALTRATE) 600-400 MG-UNIT per tablet Take 1 tablet by mouth 2 times daily       fluticasone (FLONASE) 50 MCG/ACT nasal spray Spray 1 spray into both nostrils daily       albuterol (PROAIR HFA, PROVENTIL HFA, VENTOLIN HFA) 108 (90 BASE) MCG/ACT inhaler Inhale 2 puffs into the lungs every 6 hours as needed for shortness of breath / dyspnea 3 Inhaler 3     Multiple Vitamins-Minerals (HAIR/SKIN/NAILS) TABS Take 1 tablet by mouth daily.        FEXOFENADINE HCL PO Take 180 mg by mouth daily.       Nitroglycerin (NITROQUICK SL) Place 0.4 mg under the tongue every 5 minutes as needed          ALLERGIES     Allergies   Allergen Reactions     Aspirin Hives     Reaction occurred during childhood.      Minocycline      Yellow Dye Allergy. Minocycline has Yellow Dye #10.     Yellow Dye Hives     Rxn to yellow tablet. Eyes swelled shut.        PAST MEDICAL HISTORY:  Past Medical History:   Diagnosis Date     Aortic valve sclerosis     heart murmur, no AS     Arrhythmia     PAT, PVC     Aspirin allergy     Plavix use long term     Asthma      CKD (chronic kidney disease) stage 3, GFR 30-59 ml/min     x 2007 atleast     Congestive heart failure, unspecified      Depression      Diabetes mellitus (H) 2010     Diastolic dysfunction, left ventricle 2013    grade 2, nl ef     HTN (hypertension)      Migraine headache      Myocardial infarction (H) 9/2007, cath 2013 ml    BMS: stent to OM, diag, nl EF, echo /C angia 2013 , f/u cath no lesion >40%     OA (osteoarthritis) of knee      Obesity      Rheumatoid arthritis flare (H)     prednisone     Sleep apnea     on CPAP (not using 2016)     TIA (transient ischaemic attack)      Ventral hernia, unspecified, without mention of obstruction or gangrene        PAST SURGICAL  HISTORY:  Past Surgical History:   Procedure Laterality Date     APPENDECTOMY       BIOPSY BREAST      x2 -needle & lumpectomy-benign     CHOLECYSTECTOMY       CORONARY ANGIOGRAPHY ADULT ORDER  2007    Bare metal stent to OM1, Diagonal patent      CORONARY ANGIOGRAPHY ADULT ORDER  2007    [place] stent to Diagonal     HC LEFT HEART CATHETERIZATION  2013    Moderate CAD     HYSTERECTOMY TOTAL ABDOMINAL       ORTHOPEDIC SURGERY      knee replacement on right side (), Left side ()     RELEASE CARPAL TUNNEL      right and left     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       FAMILY HISTORY:  Family History   Problem Relation Age of Onset     Neurologic Disorder Mother      MS - at 60's     C.A.D. Father       at 8o's, ? prostate ca     Breast Cancer No family hx of      Cancer - colorectal No family hx of        SOCIAL HISTORY:  Social History     Social History     Marital status:      Spouse name: N/A     Number of children: N/A     Years of education: N/A     Social History Main Topics     Smoking status: Never Smoker     Smokeless tobacco: Never Used     Alcohol use 0.0 - 0.6 oz/week     0 - 1 Standard drinks or equivalent per week      Comment: rarely     Drug use: No     Sexual activity: Not Currently     Partners: Male     Other Topics Concern     Caffeine Concern No     Sleep Concern No     Stress Concern No     Weight Concern No     Special Diet No     Exercise Yes     walking, swimming x2 a week     Seat Belt Yes     Social History Narrative    , 1 step son    Work- clown for profession        Tobacco- none,smoked for couple months in s    ETOH- occ 1/2 months    Diet coke- 2-3 cans/day    Exercise- swims 8 laps -3/week               Review of Systems:  Skin:  Negative     Eyes:    glasses  ENT:  Negative    Respiratory:  Positive for shortness of breath;dyspnea on exertion;cough;wheezing  Cardiovascular:    lightheadedness;dizziness;Positive  "for;edema  Gastroenterology: Negative    Genitourinary:  Negative    Musculoskeletal:  Positive for    Neurologic:  Negative    Psychiatric:  Negative    Heme/Lymph/Imm:  Negative    Endocrine:  Negative      Physical Exam:  Vitals: /70  Pulse 84  Ht 1.702 m (5' 7\")  Wt 124.3 kg (274 lb)  BMI 42.91 kg/m2    Constitutional:  cooperative, alert and oriented, well developed, well nourished, in no acute distress        Skin:  warm and dry to the touch, no apparent skin lesions or masses noted        Head:  normocephalic, no masses or lesions        Eyes:  pupils equal and round, conjunctivae and lids unremarkable, sclera white, no xanthalasma, EOMS intact, no nystagmus        ENT:  no pallor or cyanosis, dentition good        Neck:  carotid pulses are full and equal bilaterally, JVP normal, no carotid bruit, no thyromegaly        Chest:  normal breath sounds, clear to auscultation, normal A-P diameter, normal symmetry, normal respiratory excursion, no use of accessory muscles          Cardiac: regular rhythm;normal S1 and S2       grade 1;early systolic murmur          Abdomen:  abdomen soft;BS normoactive obese      Vascular:                                     DP pulses 2+    Extremities and Back:  no deformities, clubbing, cyanosis, erythema observed         no edema    Neurological:  affect appropriate, oriented to time, person and place          Recent Lab Results:  LIPID RESULTS:  Lab Results   Component Value Date    CHOL 154 04/11/2017    HDL 50 04/11/2017    LDL 55 04/11/2017    TRIG 244 (H) 04/11/2017    CHOLHDLRATIO 2.3 10/13/2015       LIVER ENZYME RESULTS:  Lab Results   Component Value Date    AST 15 04/11/2017    ALT 22 04/11/2017       CBC RESULTS:  Lab Results   Component Value Date    WBC 10.0 09/21/2016    RBC 4.95 09/21/2016    HGB 13.5 09/21/2016    HCT 41.9 09/21/2016    MCV 85 09/21/2016    MCH 27.3 09/21/2016    MCHC 32.2 09/21/2016    RDW 13.6 09/21/2016     09/21/2016       BMP " RESULTS:  Lab Results   Component Value Date     04/11/2017    POTASSIUM 4.3 04/11/2017    CHLORIDE 107 04/11/2017    CO2 20 04/11/2017    ANIONGAP 13 04/11/2017     (H) 04/11/2017    BUN 29 04/11/2017    CR 1.27 (H) 04/11/2017    GFRESTIMATED 40 (L) 04/11/2017    GFRESTBLACK 49 (L) 04/11/2017    TELLY 8.5 04/11/2017        A1C RESULTS:  Lab Results   Component Value Date    A1C 6.7 (H) 04/11/2017       INR RESULTS:  Lab Results   Component Value Date    INR 0.99 09/21/2016    INR 1.03 08/13/2013           CC  No referring provider defined for this encounter.

## 2017-06-09 ENCOUNTER — NURSE TRIAGE (OUTPATIENT)
Dept: NURSING | Facility: CLINIC | Age: 82
End: 2017-06-09

## 2017-06-09 DIAGNOSIS — J45.20 INTERMITTENT ASTHMA: ICD-10-CM

## 2017-06-09 RX ORDER — ALBUTEROL SULFATE 90 UG/1
2 AEROSOL, METERED RESPIRATORY (INHALATION) EVERY 6 HOURS PRN
Qty: 1 INHALER | Refills: 0 | OUTPATIENT
Start: 2017-06-09 | End: 2018-02-19

## 2017-06-09 NOTE — TELEPHONE ENCOUNTER
Additional Information    Pharmacy calling with prescription question and triager answers question    Protocols used: MEDICATION QUESTION CALL-ADULT-AH    Her Albuterol is  and needs a new prescription for upcoming season. Patient saw prescribing MD on 17.  Reyna Ramos RN  [facility]

## 2017-06-12 NOTE — TELEPHONE ENCOUNTER
Reyna Ramos, RN 3 days ago                   Additional Information    Pharmacy calling with prescription question and triager answers question    Protocols used: MEDICATION QUESTION CALL-ADULT-AH     Her Albuterol is  and needs a new prescription for upcoming season. Patient saw prescribing MD on 17.  Reyna Ramos RN  [facility]

## 2017-06-12 NOTE — TELEPHONE ENCOUNTER
Albuterol was sent into [facility]  3 days ago.  I called pharm.  They DID NOT receive.  Gave verbally to pharmacist.     albuterol (PROAIR HFA/PROVENTIL HFA/VENTOLIN HFA) 108 (90 BASE) MCG/ACT Inhaler 1 Inhaler 0 6/9/2017  No   Sig: Inhale 2 puffs into the lungs every 6 hours as needed for shortness of breath / dyspnea   Class: E-Prescribe   Route: Inhalation   Order: [id number]        Pharmacy   [facility][id number] [place] [id number]

## 2017-06-15 ENCOUNTER — HOSPITAL ENCOUNTER (OUTPATIENT)
Dept: CARDIOLOGY | Facility: CLINIC | Age: 82
Discharge: HOME | End: 2017-06-15
Attending: INTERNAL MEDICINE | Admitting: INTERNAL MEDICINE
Payer: COMMERCIAL

## 2017-06-15 ENCOUNTER — HOSPITAL ENCOUNTER (OUTPATIENT)
Dept: CARDIOLOGY | Facility: CLINIC | Age: 82
End: 2017-06-15
Attending: INTERNAL MEDICINE
Payer: COMMERCIAL

## 2017-06-15 DIAGNOSIS — I25.10 CORONARY ARTERY DISEASE INVOLVING NATIVE CORONARY ARTERY OF NATIVE HEART, ANGINA PRESENCE UNSPECIFIED: ICD-10-CM

## 2017-06-15 DIAGNOSIS — R42 DIZZINESS: ICD-10-CM

## 2017-06-15 PROCEDURE — 93017 CV STRESS TEST TRACING ONLY: CPT

## 2017-06-15 PROCEDURE — 93270 REMOTE 30 DAY ECG REV/REPORT: CPT

## 2017-06-15 PROCEDURE — 93272 ECG/REVIEW INTERPRET ONLY: CPT | Performed by: INTERNAL MEDICINE

## 2017-06-15 PROCEDURE — 93018 CV STRESS TEST I&R ONLY: CPT | Performed by: INTERNAL MEDICINE

## 2017-06-15 PROCEDURE — 40000264 ECHO COMPLETE WITH OPTISON

## 2017-06-15 PROCEDURE — 78452 HT MUSCLE IMAGE SPECT MULT: CPT | Mod: 26 | Performed by: INTERNAL MEDICINE

## 2017-06-15 PROCEDURE — 93016 CV STRESS TEST SUPVJ ONLY: CPT | Performed by: INTERNAL MEDICINE

## 2017-06-15 PROCEDURE — 25000128 H RX IP 250 OP 636: Performed by: INTERNAL MEDICINE

## 2017-06-15 PROCEDURE — 25500064 ZZH RX 255 OP 636: Performed by: INTERNAL MEDICINE

## 2017-06-15 PROCEDURE — A9502 TC99M TETROFOSMIN: HCPCS | Performed by: INTERNAL MEDICINE

## 2017-06-15 PROCEDURE — 34300033 ZZH RX 343: Performed by: INTERNAL MEDICINE

## 2017-06-15 PROCEDURE — 93306 TTE W/DOPPLER COMPLETE: CPT | Mod: 26 | Performed by: INTERNAL MEDICINE

## 2017-06-15 RX ORDER — ACYCLOVIR 200 MG/1
0-1 CAPSULE ORAL
Status: DISCONTINUED | OUTPATIENT
Start: 2017-06-15 | End: 2017-06-16 | Stop reason: HOSPADM

## 2017-06-15 RX ORDER — ALBUTEROL SULFATE 90 UG/1
2 AEROSOL, METERED RESPIRATORY (INHALATION) EVERY 5 MIN PRN
Status: DISCONTINUED | OUTPATIENT
Start: 2017-06-15 | End: 2017-06-16 | Stop reason: HOSPADM

## 2017-06-15 RX ORDER — AMINOPHYLLINE 25 MG/ML
50-100 INJECTION, SOLUTION INTRAVENOUS
Status: DISCONTINUED | OUTPATIENT
Start: 2017-06-15 | End: 2017-06-16 | Stop reason: HOSPADM

## 2017-06-15 RX ORDER — REGADENOSON 0.08 MG/ML
0.4 INJECTION, SOLUTION INTRAVENOUS ONCE
Status: COMPLETED | OUTPATIENT
Start: 2017-06-15 | End: 2017-06-15

## 2017-06-15 RX ADMIN — REGADENOSON 0.4 MG: 0.08 INJECTION, SOLUTION INTRAVENOUS at 10:51

## 2017-06-15 RX ADMIN — HUMAN ALBUMIN MICROSPHERES AND PERFLUTREN 5 ML: 10; .22 INJECTION, SOLUTION INTRAVENOUS at 11:45

## 2017-06-15 RX ADMIN — TETROFOSMIN 9.8 MCI.: 1.38 INJECTION, POWDER, LYOPHILIZED, FOR SOLUTION INTRAVENOUS at 08:59

## 2017-06-15 RX ADMIN — TETROFOSMIN 30 MCI.: 1.38 INJECTION, POWDER, LYOPHILIZED, FOR SOLUTION INTRAVENOUS at 10:54

## 2017-06-20 ENCOUNTER — TELEPHONE (OUTPATIENT)
Dept: CARDIOLOGY | Facility: CLINIC | Age: 82
End: 2017-06-20

## 2017-06-20 NOTE — TELEPHONE ENCOUNTER
6/6/17 cardionet strip HR 67 SR.  HR 80  SR with atrial couplet and PAC.  6/15/17  sinus Tachycardia To drawer.  DEB Thomas RN

## 2017-06-27 DIAGNOSIS — E11.21 TYPE 2 DIABETES MELLITUS WITH DIABETIC NEPHROPATHY (H): Primary | ICD-10-CM

## 2017-06-28 RX ORDER — DIPHENHYDRAMINE HCL 25 MG
TABLET ORAL
Qty: 1 KIT | Refills: 0 | Status: SHIPPED | OUTPATIENT
Start: 2017-06-28 | End: 2021-11-26

## 2017-07-03 ENCOUNTER — TELEPHONE (OUTPATIENT)
Dept: CARDIOLOGY | Facility: CLINIC | Age: 82
End: 2017-07-03

## 2017-07-03 NOTE — TELEPHONE ENCOUNTER
DR Sinha reveiwed nuclear stress test from 6/15/2017 - plan per Dr Sinha;  Need office visit with NP/PA/MD for cath due to positive GXT with chest pain. Called to patient who states agreement to same. Patient denies complaints at this time.  Scheduling informed to move up appointment currently scheduled with Margaret RAPP on 7/18/2017.  Jeni Mccormack RN 07/03/17 1:19 PM

## 2017-07-07 ENCOUNTER — OFFICE VISIT (OUTPATIENT)
Dept: CARDIOLOGY | Facility: CLINIC | Age: 82
End: 2017-07-07
Attending: INTERNAL MEDICINE
Payer: COMMERCIAL

## 2017-07-07 VITALS
HEIGHT: 67 IN | WEIGHT: 278 LBS | DIASTOLIC BLOOD PRESSURE: 68 MMHG | HEART RATE: 72 BPM | BODY MASS INDEX: 43.63 KG/M2 | SYSTOLIC BLOOD PRESSURE: 130 MMHG

## 2017-07-07 DIAGNOSIS — R94.39 ABNORMAL STRESS TEST: Primary | ICD-10-CM

## 2017-07-07 DIAGNOSIS — I25.10 CORONARY ARTERY DISEASE INVOLVING NATIVE CORONARY ARTERY OF NATIVE HEART, ANGINA PRESENCE UNSPECIFIED: ICD-10-CM

## 2017-07-07 DIAGNOSIS — R42 DIZZINESS: ICD-10-CM

## 2017-07-07 DIAGNOSIS — R00.2 PALPITATION: ICD-10-CM

## 2017-07-07 PROCEDURE — 99214 OFFICE O/P EST MOD 30 MIN: CPT | Performed by: NURSE PRACTITIONER

## 2017-07-07 NOTE — PROGRESS NOTES
Cardiology Clinic Progress Note  Moira Andre MRN# [id number]   YOB: 1933 Age: 83 year old     Reason for visit: Review abnormal stress test           Assessment and Plan:     1. Abnormal stress test with history of previous stentings    Proceed with invasive coronary angiography    Follow up 1 week post.    Patient is currently on Plavix daily. She has aspirin listed as a allergy with a reaction of hives.    2. Hypertension    Continue lisinopril 10 mg daily and metoprolol 100 mg daily    3. Hyperlipidemia, LDL goal less than 70    Most recent LDL in April 2017 was 55    Continue atorvastatin 20 mg daily.    4. Type 2 diabetes    Currently diet controlled    Last HbA1c was 6.7    5. Morbid obesity         History of Presenting Illness:    Moira Andre is a very pleasant 83 year old patient of Dr. Sinha who presents today to follow up echocardiogram, event monitor and Lexiscan stress test. She has a past medical history significant for CAD s/p bare metal stents to her obtuse marginal and diagonal in 2007. Post the cath in 2007 she had to have a pseudoaneurysm surgically repaired.  In 2013 she underwent repeat coronary angiography that showed normal coronaries and patent stents, and her symptoms were attributed to acid reflux.  She does have a history of obstructive sleep apnea but does not faithfully use her CPAP.  She also has history of hypertension, hyperlipidemia, anxiety, GERD, type 2 diabetes, CKD stage III, morbid obesity and chronic pain.    She saw Dr. Ngo last month for episodes of palpitations and heart racing.  She states she was driving her car palpitations became a little bit lightheaded associated with the palpitations.  She also complained of generalized decline in her functional status and becomes more short of breath with lesser activity.  He recommended that she undergo a 30 day event monitor, echocardiogram and a nuclear stress test.    The patient returned to her event  monitor today and upon review of her strips thus far showed sinus rhythm with the highest rate being 102 bpm one episode with atrial couplets and PACs.  Her echocardiogram revealed an LVEF was estimated at 65-70% with mild LVH and impaired LV relaxation.  No regional wall motion abnormalities were noted.  Her RVSP could not be approximated due to inadequate tricuspid regurgitation.  There was limited imaging of her aortic arch appear to be mildly dilated at 4.1 cm that appear to be similar from a study in 2009.    She does stress test that revealed a very small reversible inferior lateral defect at the base consistent with possible mild ischemia without infarct.  Her LV systolic function with estimated at 69% and compared to her study in 2012 there is no significant change except for slight ischemia at the the basilar inferior wall.  Dr. Ngo reviewed the stress testing and recommended that she proceed with invasive coronary angiography.    She has had no chest discomfort and her shortness of breath is stable.  She's had one episode of palpitations, but did not correlate with anything significant on the event monitor.  She does ambulate with a four wheeled walker she states is only to help her walk long distances.    Risks and benefits of left heart catheterization and coronary angiogram were discussed with the patient in detail. Including death, MI, stroke, hematoma, possible urgent bypass surgery for failed PCI, use of stents, increased risk of bleed on thienopyridine agents while on anticoagulants, possible peripheral vascular complications use of FFR in clinical-decision making, arrhythmia, and alternative of medical therapy alone associated with left heart catheterization, left ventriculography, coronary angiography, and possible percutaneous coronary intervention. The risks discussed include risk of heart attack or risk bleeding requiring surgery or transfusion of less than 1%. The risk of stroke or needing  "emergency surgery is less than 1 in 500. We discussed that contrast is used during the procedure which can potentially damage the kidney. Additionally we discussed the risk of contrast induced allergic reaction, renal dysfunction, and vascular complications. Pros and cons of bare metal vs drug eluting stents was discussed. Patient understands and wishes to proceed with it.          This note was completed in part using Dragon voice recognition software. Although reviewed after completion, some word and grammatical errors may occur       Review of Systems:   Review of Systems:  Skin:  Negative     Eyes:  Positive for glasses  ENT:  Negative    Respiratory:  Positive for shortness of breath;dyspnea on exertion;cough;wheezing  Cardiovascular:    Positive for;lightheadedness;palpitations;fatigue (1 episode while driving)  Gastroenterology: Negative    Genitourinary:  Negative    Musculoskeletal:  Positive for    Neurologic:  Negative    Psychiatric:  Negative    Heme/Lymph/Imm:  Negative    Endocrine:  Positive for diabetes              Physical Exam:     Vitals: /68  Pulse 72  Ht 1.702 m (5' 7\")  Wt 126.1 kg (278 lb)  BMI 43.54 kg/m2  Constitutional:  cooperative, alert and oriented, well developed, well nourished, in no acute distress        Skin:  warm and dry to the touch, no apparent skin lesions or masses noted        Head:  normocephalic, no masses or lesions        Eyes:  pupils equal and round;conjunctivae and lids unremarkable;sclera white        ENT:  no pallor or cyanosis, dentition good         Chest:  clear to auscultation;normal symmetry        Cardiac: regular rhythm;normal S1 and S2       grade 1;early systolic murmur          Abdomen:  abdomen soft      Extremities and Back:       trace Bilateral LE edema    Neurological:  no gross motor deficits;affect appropriate                 Medications:     Current Outpatient Prescriptions   Medication Sig Dispense Refill     Blood Glucose Monitoring " Suppl (TRUE METRIX AIR GLUCOSE METER) W/DEVICE KIT USE AS DIRECTED 1 kit 0     albuterol (PROAIR HFA/PROVENTIL HFA/VENTOLIN HFA) 108 (90 BASE) MCG/ACT Inhaler Inhale 2 puffs into the lungs every 6 hours as needed for shortness of breath / dyspnea 1 Inhaler 0     TURMERIC PO        lisinopril (PRINIVIL/ZESTRIL) 10 MG tablet Take 1 tablet (10 mg) by mouth daily 90 tablet 1     HYDROcodone-acetaminophen (NORCO) 5-325 MG per tablet Take 1 tablet by mouth every 8 hours as needed for moderate to severe pain 90 tablet 0     nystatin-triamcinolone (MYCOLOG II) cream Apply topically 2 times daily 60 g 1     IBANdronate (BONIVA) 150 MG tablet Take 1 tablet (150 mg) by mouth every 30 days 3 tablet 3     ADVAIR DISKUS 100-50 MCG/DOSE diskus inhaler INHALE 1 PUFF EVERY 12 HOURS 60 Inhaler 3     DULoxetine (CYMBALTA) 30 MG capsule Take 1 capsule (30 mg) by mouth daily 90 capsule 1     metoprolol (TOPROL-XL) 100 MG 24 hr tablet Take 1 tablet (100 mg) by mouth daily 90 tablet 3     clopidogrel (PLAVIX) 75 MG tablet Take 1 tablet (75 mg) by mouth daily 90 tablet 3     atorvastatin (LIPITOR) 20 MG tablet Take 1 tablet (20 mg) by mouth daily 90 tablet 3     order for DME DREAMSTATION  5-15 CM/H20  NASAL WISP FABRIC       blood glucose monitoring (NO BRAND SPECIFIED) meter device kit Use to test blood sugar 1-2 times daily or as directed. 1 kit 0     blood glucose monitoring (NO BRAND SPECIFIED) test strip Use to test blood sugars 1-2 times daily or as directed 100 each 3     TRUEPLUS LANCETS 28G MISC 1 each 2 times daily as needed 100 each 3     acetaminophen (TYLENOL) 325 MG tablet Take 325-650 mg by mouth every 6 hours as needed for mild pain       Glucosamine HCl 750 MG TABS Take 1 tablet by mouth 2 times daily       Cholecalciferol (VITAMIN D) 2000 UNITS CAPS Take by mouth daily       calcium-vitamin D (CALTRATE) 600-400 MG-UNIT per tablet Take 1 tablet by mouth daily        fluticasone (FLONASE) 50 MCG/ACT nasal spray Spray 1  spray into both nostrils as needed        Multiple Vitamins-Minerals (HAIR/SKIN/NAILS) TABS Take 1 tablet by mouth daily.        FEXOFENADINE HCL PO Take 180 mg by mouth daily.       Nitroglycerin (NITROQUICK SL) Place 0.4 mg under the tongue every 5 minutes as needed              Family History   Problem Relation Age of Onset     Neurologic Disorder Mother      MS - at 60's     C.A.D. Father       at 8o's, ? prostate ca     Breast Cancer No family hx of      Cancer - colorectal No family hx of        Social History     Social History     Marital status:      Spouse name: N/A     Number of children: N/A     Years of education: N/A     Occupational History     Not on file.     Social History Main Topics     Smoking status: Never Smoker     Smokeless tobacco: Never Used     Alcohol use 0.0 - 0.6 oz/week     0 - 1 Standard drinks or equivalent per week      Comment: rarely     Drug use: No     Sexual activity: Not Currently     Partners: Male     Other Topics Concern     Caffeine Concern No     Sleep Concern No     Stress Concern No     Weight Concern No     Special Diet No     Exercise Yes     walking, swimming x2 a week     Seat Belt Yes     Social History Narrative    , 1 step son    Work- clown for profession        Tobacco- none,smoked for couple months in     ETOH- occ 1/2 months    Diet coke- 2-3 cans/day    Exercise- swims 8 laps -3/week                    Past Medical History:     Past Medical History:   Diagnosis Date     Aortic valve sclerosis     heart murmur, no AS     Arrhythmia     PAT, PVC     Aspirin allergy     Plavix use long term     Asthma      CKD (chronic kidney disease) stage 3, GFR 30-59 ml/min     x  atleast     Congestive heart failure, unspecified      Depression      Diabetes mellitus (H)      Diastolic dysfunction, left ventricle 2013    grade 2, nl ef     HTN (hypertension)      Migraine headache      Myocardial infarction (H) 2007, cath 2013 ml     BMS: stent to OM, diag, nl EF, echo /C angia 2013 , f/u cath no lesion >40%     OA (osteoarthritis) of knee      Obesity      Rheumatoid arthritis flare (H)     prednisone     Sleep apnea     on CPAP (not using 2016)     TIA (transient ischaemic attack)      Ventral hernia, unspecified, without mention of obstruction or gangrene               Past Surgical History:     Past Surgical History:   Procedure Laterality Date     APPENDECTOMY       BIOPSY BREAST      x2 -needle & lumpectomy-benign     CHOLECYSTECTOMY       CORONARY ANGIOGRAPHY ADULT ORDER  9/28/2007    Bare metal stent to OM1, Diagonal patent      CORONARY ANGIOGRAPHY ADULT ORDER  9/25/2007    Reading stent to Diagonal     HC LEFT HEART CATHETERIZATION  8/2013    Moderate CAD     HYSTERECTOMY TOTAL ABDOMINAL       ORTHOPEDIC SURGERY      knee replacement on right side (2006), Left side (2016)     RELEASE CARPAL TUNNEL      right and left     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              Allergies:   Aspirin; Minocycline; and Yellow dye       Data:   All laboratory data reviewed:    LAST CHOLESTEROL:  Lab Results   Component Value Date    CHOL 154 04/11/2017     Lab Results   Component Value Date    HDL 50 04/11/2017     Lab Results   Component Value Date    LDL 55 04/11/2017     Lab Results   Component Value Date    TRIG 244 04/11/2017     Lab Results   Component Value Date    CHOLHDLRATIO 2.3 10/13/2015       LAST BMP:  Lab Results   Component Value Date     04/11/2017      Lab Results   Component Value Date    POTASSIUM 4.3 04/11/2017     Lab Results   Component Value Date    CHLORIDE 107 04/11/2017     Lab Results   Component Value Date    TELLY 8.5 04/11/2017     Lab Results   Component Value Date    CO2 20 04/11/2017     Lab Results   Component Value Date    BUN 29 04/11/2017     Lab Results   Component Value Date    CR 1.27 04/11/2017     Lab Results   Component Value Date     04/11/2017       LAST CBC:  Lab Results    Component Value Date    WBC 10.0 09/21/2016     Lab Results   Component Value Date    RBC 4.95 09/21/2016     Lab Results   Component Value Date    HGB 13.5 09/21/2016     Lab Results   Component Value Date    HCT 41.9 09/21/2016     Lab Results   Component Value Date    MCV 85 09/21/2016     Lab Results   Component Value Date    MCH 27.3 09/21/2016     Lab Results   Component Value Date    MCHC 32.2 09/21/2016     Lab Results   Component Value Date    RDW 13.6 09/21/2016     Lab Results   Component Value Date     09/21/2016         JASMINE FOSTER, APRN, CNP

## 2017-07-07 NOTE — MR AVS SNAPSHOT
After Visit Summary   7/7/2017    Moira Andre    MRN: [id number]           Patient Information     Date Of Birth          9/24/1933        Visit Information        Provider Department      7/7/2017 3:10 PM Liberty Sandra APRN CNP [facility] [place]        Today's Diagnoses     Abnormal stress test    -  1    Coronary artery disease involving native coronary artery of native heart, angina presence unspecified        Dizziness        Palpitation           Follow-ups after your visit        Additional Services     Follow-Up with Cardiac Advanced Practice Provider                 Your next 10 appointments already scheduled     Aug 15, 2017  9:00 AM CDT   Office Visit with Maryan Churchill MD   [facility]    [address] [facility] [address] [phone]           Bring a current list of meds and any records pertaining to this visit.  For Physicals, please bring immunization records and any forms needing to be filled out.  Please arrive 10 minutes early to complete paperwork.              Future tests that were ordered for you today     Open Future Orders        Priority Expected Expires Ordered    Follow-Up with Cardiac Advanced Practice Provider Routine 7/21/2017 7/7/2018 7/7/2017    Cardiac Cath: Coronary Angiography Adult Order Routine 7/14/2017 7/2/2018 7/7/2017            Who to contact     If you have questions or need follow up information about today's clinic visit or your schedule please contact [facility] directly at [phone].  Normal or non-critical lab and imaging results will be communicated to you by MyChart, letter or phone within 4 business days after the clinic has received the results. If you do not hear from us within 7 days, please contact the clinic through MyChart or phone. If you have a critical or abnormal lab result, we will notify you by phone as  "soon as possible.  Submit refill requests through [employer] or call your pharmacy and they will forward the refill request to us. Please allow 3 business days for your refill to be completed.          Additional Information About Your Visit        [email or web address] Information     [employer] lets you send messages to your doctor, view your test results, renew your prescriptions, schedule appointments and more. To sign up, go to [email or web address] . Click on \"Log in\" on the left side of the screen, which will take you to the Welcome page. Then click on \"Sign up Now\" on the right side of the page.     You will be asked to enter the access code listed below, as well as some personal information. Please follow the directions to create your username and password.     Your access code is: [email or web address]  Expires: 10/5/2017  3:22 PM     Your access code will  in 90 days. If you need help or a new code, please call your [place] clinic or [phone].        Care EveryWhere ID     This is your Care EveryWhere ID. This could be used by other organizations to access your [place] medical records  [id number]        Your Vitals Were     Pulse Height BMI (Body Mass Index)             72 1.702 m (5' 7\") 43.54 kg/m2          Blood Pressure from Last 3 Encounters:   17 130/68   17 130/70   17 137/77    Weight from Last 3 Encounters:   17 126.1 kg (278 lb)   17 124.3 kg (274 lb)   17 124.7 kg (275 lb)              We Performed the Following     Follow-Up with Cardiac Advanced Practice Provider        Primary Care Provider Office Phone # Fax #    Maryan Churchill [phone]       [facility]    [address] [facility] [address]  [facility] [address]        Equal Access to Services     HUMA AGUILAR : Diane Grey, waaxda luqadaha, qaybta kaalandreina sherman . [facility] [phone].    ATENCIÓN: Si shan hogan, " tiene a gold disposición servicios gratuitos de asistencia lingüística. hCuck hagen [phone].    We comply with applicable federal civil rights laws and [place] laws. We do not discriminate on the basis of race, color, national origin, age, disability sex, sexual orientation or gender identity.            Thank you!     Thank you for choosing [facility] [place]  for your care. Our goal is always to provide you with excellent care. Hearing back from our patients is one way we can continue to improve our services. Please take a few minutes to complete the written survey that you may receive in the mail after your visit with us. Thank you!             Your Updated Medication List - Protect others around you: Learn how to safely use, store and throw away your medicines at www.disposemymeds.org.          This list is accurate as of: 7/7/17  3:22 PM.  Always use your most recent med list.                   Brand Name Dispense Instructions for use Diagnosis    acetaminophen 325 MG tablet    TYLENOL     Take 325-650 mg by mouth every 6 hours as needed for mild pain        ADVAIR DISKUS 100-50 MCG/DOSE diskus inhaler   Generic drug:  fluticasone-salmeterol     60 Inhaler    INHALE 1 PUFF EVERY 12 HOURS    Intermittent asthma, uncomplicated       albuterol 108 (90 BASE) MCG/ACT Inhaler    PROAIR HFA/PROVENTIL HFA/VENTOLIN HFA    1 Inhaler    Inhale 2 puffs into the lungs every 6 hours as needed for shortness of breath / dyspnea    Intermittent asthma       atorvastatin 20 MG tablet    LIPITOR    90 tablet    Take 1 tablet (20 mg) by mouth daily    Hyperlipidemia LDL goal <70       * blood glucose monitoring meter device kit    no brand specified    1 kit    Use to test blood sugar 1-2 times daily or as directed.    Type 2 diabetes mellitus with diabetic nephropathy (H)       * TRUE METRIX AIR GLUCOSE METER W/DEVICE Kit     1 kit    USE AS DIRECTED    Type 2 diabetes mellitus with diabetic  nephropathy (H)       blood glucose monitoring test strip    no brand specified    100 each    Use to test blood sugars 1-2 times daily or as directed    Type 2 diabetes mellitus with diabetic nephropathy (H)       calcium-vitamin D 600-400 MG-UNIT per tablet    CALTRATE     Take 1 tablet by mouth daily        clopidogrel 75 MG tablet    PLAVIX    90 tablet    Take 1 tablet (75 mg) by mouth daily    Coronary artery disease involving native coronary artery of native heart without angina pectoris       DULoxetine 30 MG EC capsule    CYMBALTA    90 capsule    Take 1 capsule (30 mg) by mouth daily    Major depressive disorder, recurrent episode, moderate (H)       FEXOFENADINE HCL PO      Take 180 mg by mouth daily.        FLONASE 50 MCG/ACT spray   Generic drug:  fluticasone      Spray 1 spray into both nostrils as needed        Glucosamine HCl 750 MG Tabs      Take 1 tablet by mouth 2 times daily        HAIR/SKIN/NAILS Tabs      Take 1 tablet by mouth daily.        HYDROcodone-acetaminophen 5-325 MG per tablet    NORCO    90 tablet    Take 1 tablet by mouth every 8 hours as needed for moderate to severe pain    Multiple joint pain       IBANdronate 150 MG tablet    BONIVA    3 tablet    Take 1 tablet (150 mg) by mouth every 30 days    Osteopenia       lisinopril 10 MG tablet    PRINIVIL/ZESTRIL    90 tablet    Take 1 tablet (10 mg) by mouth daily    Essential hypertension with goal blood pressure less than 140/90       metoprolol 100 MG 24 hr tablet    TOPROL-XL    90 tablet    Take 1 tablet (100 mg) by mouth daily    Essential hypertension with goal blood pressure less than 140/90       NITROQUICK SL      Place 0.4 mg under the tongue every 5 minutes as needed        nystatin-triamcinolone cream    MYCOLOG II    60 g    Apply topically 2 times daily    Tinea of the body       order for DME      DREAMSTATION 5-15 CM/H20 NASAL WISP FABRIC        TRUEPLUS LANCETS 28G Misc     100 each    1 each 2 times daily as needed     Type 2 diabetes mellitus with diabetic nephropathy (H)       TURMERIC PO           vitamin D 2000 UNITS Caps      Take by mouth daily        * Notice:  This list has 2 medication(s) that are the same as other medications prescribed for you. Read the directions carefully, and ask your doctor or other care provider to review them with you.

## 2017-07-07 NOTE — LETTER
7/7/2017    Maryan Churchill MD  [facility]      [address]  [place] [address]    RE: Moira Andre       Dear Colleague,    I had the pleasure of seeing Moira Andre in the [facility].    Cardiology Clinic Progress Note  Moira Andre MRN# [id number]   YOB: 1933 Age: 83 year old     Reason for visit: Review abnormal stress test           Assessment and Plan:     1. Abnormal stress test with history of previous stentings    Proceed with invasive coronary angiography    Follow up 1 week post.    Patient is currently on Plavix daily. She has aspirin listed as a allergy with a reaction of hives.    2. Hypertension    Continue lisinopril 10 mg daily and metoprolol 100 mg daily    3. Hyperlipidemia, LDL goal less than 70    Most recent LDL in April 2017 was 55    Continue atorvastatin 20 mg daily.    4. Type 2 diabetes    Currently diet controlled    Last HbA1c was 6.7    5. Morbid obesity         History of Presenting Illness:    Moira Andre is a very pleasant 83 year old patient of Dr. Sinha who presents today to follow up echocardiogram, event monitor and Lexiscan stress test. She has a past medical history significant for CAD s/p bare metal stents to her obtuse marginal and diagonal in 2007. Post the cath in 2007 she had to have a pseudoaneurysm surgically repaired.  In 2013 she underwent repeat coronary angiography that showed normal coronaries and patent stents, and her symptoms were attributed to acid reflux.  She does have a history of obstructive sleep apnea but does not faithfully use her CPAP.  She also has history of hypertension, hyperlipidemia, anxiety, GERD, type 2 diabetes, CKD stage III, morbid obesity and chronic pain.    She saw Dr. Ngo last month for episodes of palpitations and heart racing.  She states she was driving her car palpitations became a little bit lightheaded associated with the palpitations.  She also  complained of generalized decline in her functional status and becomes more short of breath with lesser activity.  He recommended that she undergo a 30 day event monitor, echocardiogram and a nuclear stress test.    The patient returned to her event monitor today and upon review of her strips thus far showed sinus rhythm with the highest rate being 102 bpm one episode with atrial couplets and PACs.  Her echocardiogram revealed an LVEF was estimated at 65-70% with mild LVH and impaired LV relaxation.  No regional wall motion abnormalities were noted.  Her RVSP could not be approximated due to inadequate tricuspid regurgitation.  There was limited imaging of her aortic arch appear to be mildly dilated at 4.1 cm that appear to be similar from a study in 2009.    She does stress test that revealed a very small reversible inferior lateral defect at the base consistent with possible mild ischemia without infarct.  Her LV systolic function with estimated at 69% and compared to her study in 2012 there is no significant change except for slight ischemia at the the basilar inferior wall.  Dr. Ngo reviewed the stress testing and recommended that she proceed with invasive coronary angiography.    She has had no chest discomfort and her shortness of breath is stable.  She's had one episode of palpitations, but did not correlate with anything significant on the event monitor.  She does ambulate with a four wheeled walker she states is only to help her walk long distances.    Risks and benefits of left heart catheterization and coronary angiogram were discussed with the patient in detail. Including death, MI, stroke, hematoma, possible urgent bypass surgery for failed PCI, use of stents, increased risk of bleed on thienopyridine agents while on anticoagulants, possible peripheral vascular complications use of FFR in clinical-decision making, arrhythmia, and alternative of medical therapy alone associated with left heart  "catheterization, left ventriculography, coronary angiography, and possible percutaneous coronary intervention. The risks discussed include risk of heart attack or risk bleeding requiring surgery or transfusion of less than 1%. The risk of stroke or needing emergency surgery is less than 1 in 500. We discussed that contrast is used during the procedure which can potentially damage the kidney. Additionally we discussed the risk of contrast induced allergic reaction, renal dysfunction, and vascular complications. Pros and cons of bare metal vs drug eluting stents was discussed. Patient understands and wishes to proceed with it.          This note was completed in part using Dragon voice recognition software. Although reviewed after completion, some word and grammatical errors may occur       Review of Systems:   Review of Systems:  Skin:  Negative     Eyes:  Positive for glasses  ENT:  Negative    Respiratory:  Positive for shortness of breath;dyspnea on exertion;cough;wheezing  Cardiovascular:    Positive for;lightheadedness;palpitations;fatigue (1 episode while driving)  Gastroenterology: Negative    Genitourinary:  Negative    Musculoskeletal:  Positive for    Neurologic:  Negative    Psychiatric:  Negative    Heme/Lymph/Imm:  Negative    Endocrine:  Positive for diabetes              Physical Exam:     Vitals: /68  Pulse 72  Ht 1.702 m (5' 7\")  Wt 126.1 kg (278 lb)  BMI 43.54 kg/m2  Constitutional:  cooperative, alert and oriented, well developed, well nourished, in no acute distress        Skin:  warm and dry to the touch, no apparent skin lesions or masses noted        Head:  normocephalic, no masses or lesions        Eyes:  pupils equal and round;conjunctivae and lids unremarkable;sclera white        ENT:  no pallor or cyanosis, dentition good         Chest:  clear to auscultation;normal symmetry        Cardiac: regular rhythm;normal S1 and S2       grade 1;early systolic murmur          Abdomen:  " abdomen soft      Extremities and Back:       trace Bilateral LE edema    Neurological:  no gross motor deficits;affect appropriate                 Medications:     Current Outpatient Prescriptions   Medication Sig Dispense Refill     Blood Glucose Monitoring Suppl (TRUE METRIX AIR GLUCOSE METER) W/DEVICE KIT USE AS DIRECTED 1 kit 0     albuterol (PROAIR HFA/PROVENTIL HFA/VENTOLIN HFA) 108 (90 BASE) MCG/ACT Inhaler Inhale 2 puffs into the lungs every 6 hours as needed for shortness of breath / dyspnea 1 Inhaler 0     TURMERIC PO        lisinopril (PRINIVIL/ZESTRIL) 10 MG tablet Take 1 tablet (10 mg) by mouth daily 90 tablet 1     HYDROcodone-acetaminophen (NORCO) 5-325 MG per tablet Take 1 tablet by mouth every 8 hours as needed for moderate to severe pain 90 tablet 0     nystatin-triamcinolone (MYCOLOG II) cream Apply topically 2 times daily 60 g 1     IBANdronate (BONIVA) 150 MG tablet Take 1 tablet (150 mg) by mouth every 30 days 3 tablet 3     ADVAIR DISKUS 100-50 MCG/DOSE diskus inhaler INHALE 1 PUFF EVERY 12 HOURS 60 Inhaler 3     DULoxetine (CYMBALTA) 30 MG capsule Take 1 capsule (30 mg) by mouth daily 90 capsule 1     metoprolol (TOPROL-XL) 100 MG 24 hr tablet Take 1 tablet (100 mg) by mouth daily 90 tablet 3     clopidogrel (PLAVIX) 75 MG tablet Take 1 tablet (75 mg) by mouth daily 90 tablet 3     atorvastatin (LIPITOR) 20 MG tablet Take 1 tablet (20 mg) by mouth daily 90 tablet 3     order for DME DREAMSTATION  5-15 CM/H20  NASAL WISP FABRIC       blood glucose monitoring (NO BRAND SPECIFIED) meter device kit Use to test blood sugar 1-2 times daily or as directed. 1 kit 0     blood glucose monitoring (NO BRAND SPECIFIED) test strip Use to test blood sugars 1-2 times daily or as directed 100 each 3     TRUEPLUS LANCETS 28G MISC 1 each 2 times daily as needed 100 each 3     acetaminophen (TYLENOL) 325 MG tablet Take 325-650 mg by mouth every 6 hours as needed for mild pain       Glucosamine HCl 750 MG TABS  Take 1 tablet by mouth 2 times daily       Cholecalciferol (VITAMIN D) 2000 UNITS CAPS Take by mouth daily       calcium-vitamin D (CALTRATE) 600-400 MG-UNIT per tablet Take 1 tablet by mouth daily        fluticasone (FLONASE) 50 MCG/ACT nasal spray Spray 1 spray into both nostrils as needed        Multiple Vitamins-Minerals (HAIR/SKIN/NAILS) TABS Take 1 tablet by mouth daily.        FEXOFENADINE HCL PO Take 180 mg by mouth daily.       Nitroglycerin (NITROQUICK SL) Place 0.4 mg under the tongue every 5 minutes as needed              Family History   Problem Relation Age of Onset     Neurologic Disorder Mother      MS - at 60's     C.A.D. Father       at 8o's, ? prostate ca     Breast Cancer No family hx of      Cancer - colorectal No family hx of        Social History     Social History     Marital status:      Spouse name: N/A     Number of children: N/A     Years of education: N/A     Occupational History     Not on file.     Social History Main Topics     Smoking status: Never Smoker     Smokeless tobacco: Never Used     Alcohol use 0.0 - 0.6 oz/week     0 - 1 Standard drinks or equivalent per week      Comment: rarely     Drug use: No     Sexual activity: Not Currently     Partners: Male     Other Topics Concern     Caffeine Concern No     Sleep Concern No     Stress Concern No     Weight Concern No     Special Diet No     Exercise Yes     walking, swimming x2 a week     Seat Belt Yes     Social History Narrative    , 1 step son    Work- clown for profession        Tobacco- none,smoked for couple months in 's    ETOH- occ 1/2 months    Diet coke- 2-3 cans/day    Exercise- swims 8 laps -3/week                    Past Medical History:     Past Medical History:   Diagnosis Date     Aortic valve sclerosis     heart murmur, no AS     Arrhythmia     PAT, PVC     Aspirin allergy     Plavix use long term     Asthma      CKD (chronic kidney disease) stage 3, GFR 30-59 ml/min     x   atleast     Congestive heart failure, unspecified      Depression      Diabetes mellitus (H) 2010     Diastolic dysfunction, left ventricle 2013    grade 2, nl ef     HTN (hypertension)      Migraine headache      Myocardial infarction (H) 9/2007, cath 2013 ml    BMS: stent to OM, diag, nl EF, echo /C angia 2013 , f/u cath no lesion >40%     OA (osteoarthritis) of knee      Obesity      Rheumatoid arthritis flare (H)     prednisone     Sleep apnea     on CPAP (not using 2016)     TIA (transient ischaemic attack)      Ventral hernia, unspecified, without mention of obstruction or gangrene               Past Surgical History:     Past Surgical History:   Procedure Laterality Date     APPENDECTOMY       BIOPSY BREAST      x2 -needle & lumpectomy-benign     CHOLECYSTECTOMY       CORONARY ANGIOGRAPHY ADULT ORDER  9/28/2007    Bare metal stent to OM1, Diagonal patent      CORONARY ANGIOGRAPHY ADULT ORDER  9/25/2007    [place]ior stent to Diagonal     HC LEFT HEART CATHETERIZATION  8/2013    Moderate CAD     HYSTERECTOMY TOTAL ABDOMINAL       ORTHOPEDIC SURGERY      knee replacement on right side (2006), Left side (2016)     RELEASE CARPAL TUNNEL      right and left     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              Allergies:   Aspirin; Minocycline; and Yellow dye       Data:   All laboratory data reviewed:    LAST CHOLESTEROL:  Lab Results   Component Value Date    CHOL 154 04/11/2017     Lab Results   Component Value Date    HDL 50 04/11/2017     Lab Results   Component Value Date    LDL 55 04/11/2017     Lab Results   Component Value Date    TRIG 244 04/11/2017     Lab Results   Component Value Date    CHOLHDLRATIO 2.3 10/13/2015       LAST BMP:  Lab Results   Component Value Date     04/11/2017      Lab Results   Component Value Date    POTASSIUM 4.3 04/11/2017     Lab Results   Component Value Date    CHLORIDE 107 04/11/2017     Lab Results   Component Value Date    TELLY 8.5 04/11/2017     Lab  Results   Component Value Date    CO2 20 04/11/2017     Lab Results   Component Value Date    BUN 29 04/11/2017     Lab Results   Component Value Date    CR 1.27 04/11/2017     Lab Results   Component Value Date     04/11/2017       LAST CBC:  Lab Results   Component Value Date    WBC 10.0 09/21/2016     Lab Results   Component Value Date    RBC 4.95 09/21/2016     Lab Results   Component Value Date    HGB 13.5 09/21/2016     Lab Results   Component Value Date    HCT 41.9 09/21/2016     Lab Results   Component Value Date    MCV 85 09/21/2016     Lab Results   Component Value Date    MCH 27.3 09/21/2016     Lab Results   Component Value Date    MCHC 32.2 09/21/2016     Lab Results   Component Value Date    RDW 13.6 09/21/2016     Lab Results   Component Value Date     09/21/2016     Thank you for allowing me to participate in the care of your patient.    Sincerely,     YOHANNES MENDOZA [facility]

## 2017-07-18 ENCOUNTER — TELEPHONE (OUTPATIENT)
Dept: CARDIOLOGY | Facility: CLINIC | Age: 82
End: 2017-07-18

## 2017-07-18 DIAGNOSIS — R94.39 ABNORMAL CARDIOVASCULAR STRESS TEST: Primary | ICD-10-CM

## 2017-07-18 RX ORDER — SODIUM CHLORIDE 9 MG/ML
INJECTION, SOLUTION INTRAVENOUS CONTINUOUS
Status: CANCELLED | OUTPATIENT
Start: 2017-07-18

## 2017-07-18 RX ORDER — LIDOCAINE 40 MG/G
CREAM TOPICAL
Status: CANCELLED | OUTPATIENT
Start: 2017-07-18

## 2017-07-18 RX ORDER — POTASSIUM CHLORIDE 1500 MG/1
20 TABLET, EXTENDED RELEASE ORAL
Status: CANCELLED | OUTPATIENT
Start: 2017-07-18

## 2017-07-18 NOTE — TELEPHONE ENCOUNTER
Called Pt reviewed heart cath pre-procedure. Has , , allergy to aspirin , takes nothing for DM, will take heart meds. Will inform nursing in the am regarding aspirin and has taken in past without issues. NPO, extra water. DEB Thomas RN

## 2017-07-19 ENCOUNTER — APPOINTMENT (OUTPATIENT)
Dept: CARDIOLOGY | Facility: CLINIC | Age: 82
End: 2017-07-19
Attending: NURSE PRACTITIONER
Payer: COMMERCIAL

## 2017-07-19 ENCOUNTER — HOSPITAL ENCOUNTER (OUTPATIENT)
Facility: CLINIC | Age: 82
Discharge: HOME | End: 2017-07-19
Attending: INTERNAL MEDICINE | Admitting: INTERNAL MEDICINE
Payer: COMMERCIAL

## 2017-07-19 VITALS
HEIGHT: 67 IN | BODY MASS INDEX: 43.43 KG/M2 | DIASTOLIC BLOOD PRESSURE: 73 MMHG | HEART RATE: 63 BPM | OXYGEN SATURATION: 92 % | WEIGHT: 276.7 LBS | SYSTOLIC BLOOD PRESSURE: 109 MMHG | RESPIRATION RATE: 18 BRPM | TEMPERATURE: 98.4 F

## 2017-07-19 DIAGNOSIS — I25.10 CORONARY ARTERY DISEASE INVOLVING NATIVE CORONARY ARTERY OF NATIVE HEART, ANGINA PRESENCE UNSPECIFIED: ICD-10-CM

## 2017-07-19 DIAGNOSIS — R94.39 ABNORMAL STRESS TEST: ICD-10-CM

## 2017-07-19 DIAGNOSIS — R00.2 PALPITATION: ICD-10-CM

## 2017-07-19 DIAGNOSIS — Z98.890 STATUS POST CORONARY ANGIOGRAM: Primary | ICD-10-CM

## 2017-07-19 LAB
ANION GAP SERPL CALCULATED.3IONS-SCNC: 12 MMOL/L (ref 3–14)
APTT PPP: 29 SEC (ref 22–37)
BUN SERPL-MCNC: 18 MG/DL (ref 7–30)
CALCIUM SERPL-MCNC: 9 MG/DL (ref 8.5–10.1)
CHLORIDE SERPL-SCNC: 107 MMOL/L (ref 94–109)
CO2 SERPL-SCNC: 20 MMOL/L (ref 20–32)
CREAT SERPL-MCNC: 1.06 MG/DL (ref 0.52–1.04)
ERYTHROCYTE [DISTWIDTH] IN BLOOD BY AUTOMATED COUNT: 15.1 % (ref 10–15)
GFR SERPL CREATININE-BSD FRML MDRD: 49 ML/MIN/1.7M2
GLUCOSE SERPL-MCNC: 128 MG/DL (ref 70–99)
HCT VFR BLD AUTO: 40.2 % (ref 35–47)
HGB BLD-MCNC: 12.7 G/DL (ref 11.7–15.7)
INR PPP: 0.93 (ref 0.86–1.14)
MCH RBC QN AUTO: 26.1 PG (ref 26.5–33)
MCHC RBC AUTO-ENTMCNC: 31.6 G/DL (ref 31.5–36.5)
MCV RBC AUTO: 83 FL (ref 78–100)
PLATELET # BLD AUTO: 273 10E9/L (ref 150–450)
POTASSIUM SERPL-SCNC: 4.6 MMOL/L (ref 3.4–5.3)
RBC # BLD AUTO: 4.87 10E12/L (ref 3.8–5.2)
SODIUM SERPL-SCNC: 139 MMOL/L (ref 133–144)
WBC # BLD AUTO: 8.3 10E9/L (ref 4–11)

## 2017-07-19 PROCEDURE — 93454 CORONARY ARTERY ANGIO S&I: CPT

## 2017-07-19 PROCEDURE — 93010 ELECTROCARDIOGRAM REPORT: CPT | Performed by: INTERNAL MEDICINE

## 2017-07-19 PROCEDURE — 27211089 ZZH KIT ACIST INJECTOR CR3

## 2017-07-19 PROCEDURE — 27210795 ZZH PAD DEFIB QUICK CR4

## 2017-07-19 PROCEDURE — 93571 IV DOP VEL&/PRESS C FLO 1ST: CPT | Mod: 26 | Performed by: INTERNAL MEDICINE

## 2017-07-19 PROCEDURE — 25000125 ZZHC RX 250: Performed by: INTERNAL MEDICINE

## 2017-07-19 PROCEDURE — 99153 MOD SED SAME PHYS/QHP EA: CPT

## 2017-07-19 PROCEDURE — 36415 COLL VENOUS BLD VENIPUNCTURE: CPT | Performed by: INTERNAL MEDICINE

## 2017-07-19 PROCEDURE — 27210856 ZZH ACCESS HEART CATH CR2

## 2017-07-19 PROCEDURE — 80048 BASIC METABOLIC PNL TOTAL CA: CPT | Performed by: INTERNAL MEDICINE

## 2017-07-19 PROCEDURE — 99152 MOD SED SAME PHYS/QHP 5/>YRS: CPT

## 2017-07-19 PROCEDURE — 93571 IV DOP VEL&/PRESS C FLO 1ST: CPT

## 2017-07-19 PROCEDURE — 27210946 ZZH KIT HC TOTES DISP CR8

## 2017-07-19 PROCEDURE — 40000065 ZZH STATISTIC EKG NON-CHARGEABLE

## 2017-07-19 PROCEDURE — 27210914 ZZH SHEATH CR8

## 2017-07-19 PROCEDURE — 40000852 ZZH STATISTIC HEART CATH LAB OR EP LAB

## 2017-07-19 PROCEDURE — 27210892 ZZH CATH CR4

## 2017-07-19 PROCEDURE — 85610 PROTHROMBIN TIME: CPT | Performed by: INTERNAL MEDICINE

## 2017-07-19 PROCEDURE — 27210787 ZZH MANIFOLD CR2

## 2017-07-19 PROCEDURE — 25000128 H RX IP 250 OP 636: Performed by: INTERNAL MEDICINE

## 2017-07-19 PROCEDURE — 4A033BC MEASUREMENT OF ARTERIAL PRESSURE, CORONARY, PERCUTANEOUS APPROACH: ICD-10-PCS | Performed by: INTERNAL MEDICINE

## 2017-07-19 PROCEDURE — C1769 GUIDE WIRE: HCPCS

## 2017-07-19 PROCEDURE — 93454 CORONARY ARTERY ANGIO S&I: CPT | Mod: 26 | Performed by: INTERNAL MEDICINE

## 2017-07-19 PROCEDURE — 85730 THROMBOPLASTIN TIME PARTIAL: CPT | Performed by: INTERNAL MEDICINE

## 2017-07-19 PROCEDURE — 85027 COMPLETE CBC AUTOMATED: CPT | Performed by: INTERNAL MEDICINE

## 2017-07-19 PROCEDURE — 93005 ELECTROCARDIOGRAM TRACING: CPT

## 2017-07-19 PROCEDURE — 99152 MOD SED SAME PHYS/QHP 5/>YRS: CPT | Performed by: INTERNAL MEDICINE

## 2017-07-19 PROCEDURE — B2111ZZ FLUOROSCOPY OF MULTIPLE CORONARY ARTERIES USING LOW OSMOLAR CONTRAST: ICD-10-PCS | Performed by: INTERNAL MEDICINE

## 2017-07-19 RX ORDER — LIDOCAINE 40 MG/G
CREAM TOPICAL
Status: DISCONTINUED | OUTPATIENT
Start: 2017-07-19 | End: 2017-07-19 | Stop reason: HOSPADM

## 2017-07-19 RX ORDER — NALOXONE HYDROCHLORIDE 0.4 MG/ML
0.4 INJECTION, SOLUTION INTRAMUSCULAR; INTRAVENOUS; SUBCUTANEOUS EVERY 5 MIN PRN
Status: DISCONTINUED | OUTPATIENT
Start: 2017-07-19 | End: 2017-07-19 | Stop reason: HOSPADM

## 2017-07-19 RX ORDER — POTASSIUM CHLORIDE 29.8 MG/ML
20 INJECTION INTRAVENOUS
Status: DISCONTINUED | OUTPATIENT
Start: 2017-07-19 | End: 2017-07-19 | Stop reason: HOSPADM

## 2017-07-19 RX ORDER — ASPIRIN 81 MG/1
81-324 TABLET, CHEWABLE ORAL
Status: DISCONTINUED | OUTPATIENT
Start: 2017-07-19 | End: 2017-07-19 | Stop reason: HOSPADM

## 2017-07-19 RX ORDER — SODIUM CHLORIDE 9 MG/ML
INJECTION, SOLUTION INTRAVENOUS CONTINUOUS
Status: DISCONTINUED | OUTPATIENT
Start: 2017-07-19 | End: 2017-07-19 | Stop reason: HOSPADM

## 2017-07-19 RX ORDER — ONDANSETRON 2 MG/ML
4 INJECTION INTRAMUSCULAR; INTRAVENOUS EVERY 4 HOURS PRN
Status: DISCONTINUED | OUTPATIENT
Start: 2017-07-19 | End: 2017-07-19 | Stop reason: HOSPADM

## 2017-07-19 RX ORDER — METHYLPREDNISOLONE SODIUM SUCCINATE 125 MG/2ML
125 INJECTION, POWDER, LYOPHILIZED, FOR SOLUTION INTRAMUSCULAR; INTRAVENOUS
Status: DISCONTINUED | OUTPATIENT
Start: 2017-07-19 | End: 2017-07-19 | Stop reason: HOSPADM

## 2017-07-19 RX ORDER — PRASUGREL 10 MG/1
10-60 TABLET, FILM COATED ORAL
Status: DISCONTINUED | OUTPATIENT
Start: 2017-07-19 | End: 2017-07-19 | Stop reason: HOSPADM

## 2017-07-19 RX ORDER — NIFEDIPINE 10 MG/1
10 CAPSULE ORAL
Status: DISCONTINUED | OUTPATIENT
Start: 2017-07-19 | End: 2017-07-19 | Stop reason: HOSPADM

## 2017-07-19 RX ORDER — LIDOCAINE HYDROCHLORIDE 10 MG/ML
1-10 INJECTION, SOLUTION EPIDURAL; INFILTRATION; INTRACAUDAL; PERINEURAL
Status: COMPLETED | OUTPATIENT
Start: 2017-07-19 | End: 2017-07-19

## 2017-07-19 RX ORDER — LIDOCAINE HYDROCHLORIDE 10 MG/ML
30 INJECTION, SOLUTION EPIDURAL; INFILTRATION; INTRACAUDAL; PERINEURAL
Status: DISCONTINUED | OUTPATIENT
Start: 2017-07-19 | End: 2017-07-19 | Stop reason: HOSPADM

## 2017-07-19 RX ORDER — NICARDIPINE HYDROCHLORIDE 2.5 MG/ML
100 INJECTION INTRAVENOUS
Status: DISCONTINUED | OUTPATIENT
Start: 2017-07-19 | End: 2017-07-19 | Stop reason: HOSPADM

## 2017-07-19 RX ORDER — MORPHINE SULFATE 2 MG/ML
1-2 INJECTION, SOLUTION INTRAMUSCULAR; INTRAVENOUS EVERY 5 MIN PRN
Status: DISCONTINUED | OUTPATIENT
Start: 2017-07-19 | End: 2017-07-19 | Stop reason: HOSPADM

## 2017-07-19 RX ORDER — ATROPINE SULFATE 0.1 MG/ML
.5-1 INJECTION INTRAVENOUS
Status: DISCONTINUED | OUTPATIENT
Start: 2017-07-19 | End: 2017-07-19 | Stop reason: HOSPADM

## 2017-07-19 RX ORDER — NALOXONE HYDROCHLORIDE 0.4 MG/ML
.1-.4 INJECTION, SOLUTION INTRAMUSCULAR; INTRAVENOUS; SUBCUTANEOUS
Status: DISCONTINUED | OUTPATIENT
Start: 2017-07-19 | End: 2017-07-19 | Stop reason: HOSPADM

## 2017-07-19 RX ORDER — NITROGLYCERIN 5 MG/ML
100-500 VIAL (ML) INTRAVENOUS
Status: COMPLETED | OUTPATIENT
Start: 2017-07-19 | End: 2017-07-19

## 2017-07-19 RX ORDER — PHENYLEPHRINE HCL IN 0.9% NACL 1 MG/10 ML
20-100 SYRINGE (ML) INTRAVENOUS
Status: DISCONTINUED | OUTPATIENT
Start: 2017-07-19 | End: 2017-07-19 | Stop reason: HOSPADM

## 2017-07-19 RX ORDER — LORAZEPAM 2 MG/ML
.5-2 INJECTION INTRAMUSCULAR EVERY 4 HOURS PRN
Status: DISCONTINUED | OUTPATIENT
Start: 2017-07-19 | End: 2017-07-19 | Stop reason: HOSPADM

## 2017-07-19 RX ORDER — HEPARIN SODIUM 1000 [USP'U]/ML
1000-10000 INJECTION, SOLUTION INTRAVENOUS; SUBCUTANEOUS EVERY 5 MIN PRN
Status: DISCONTINUED | OUTPATIENT
Start: 2017-07-19 | End: 2017-07-19 | Stop reason: HOSPADM

## 2017-07-19 RX ORDER — METOPROLOL TARTRATE 1 MG/ML
5 INJECTION, SOLUTION INTRAVENOUS EVERY 5 MIN PRN
Status: DISCONTINUED | OUTPATIENT
Start: 2017-07-19 | End: 2017-07-19 | Stop reason: HOSPADM

## 2017-07-19 RX ORDER — ASPIRIN 325 MG
325 TABLET ORAL
Status: DISCONTINUED | OUTPATIENT
Start: 2017-07-19 | End: 2017-07-19 | Stop reason: HOSPADM

## 2017-07-19 RX ORDER — FENTANYL CITRATE 50 UG/ML
25-50 INJECTION, SOLUTION INTRAMUSCULAR; INTRAVENOUS
Status: DISCONTINUED | OUTPATIENT
Start: 2017-07-19 | End: 2017-07-19 | Stop reason: HOSPADM

## 2017-07-19 RX ORDER — ADENOSINE 3 MG/ML
12-12000 INJECTION, SOLUTION INTRAVENOUS
Status: DISCONTINUED | OUTPATIENT
Start: 2017-07-19 | End: 2017-07-19 | Stop reason: HOSPADM

## 2017-07-19 RX ORDER — SODIUM NITROPRUSSIDE 25 MG/ML
100-200 INJECTION INTRAVENOUS
Status: DISCONTINUED | OUTPATIENT
Start: 2017-07-19 | End: 2017-07-19 | Stop reason: HOSPADM

## 2017-07-19 RX ORDER — DEXTROSE MONOHYDRATE 25 G/50ML
12.5-5 INJECTION, SOLUTION INTRAVENOUS EVERY 30 MIN PRN
Status: DISCONTINUED | OUTPATIENT
Start: 2017-07-19 | End: 2017-07-19 | Stop reason: HOSPADM

## 2017-07-19 RX ORDER — DIPHENHYDRAMINE HYDROCHLORIDE 50 MG/ML
25-50 INJECTION INTRAMUSCULAR; INTRAVENOUS
Status: DISCONTINUED | OUTPATIENT
Start: 2017-07-19 | End: 2017-07-19 | Stop reason: HOSPADM

## 2017-07-19 RX ORDER — CLOPIDOGREL BISULFATE 75 MG/1
75 TABLET ORAL
Status: DISCONTINUED | OUTPATIENT
Start: 2017-07-19 | End: 2017-07-19 | Stop reason: HOSPADM

## 2017-07-19 RX ORDER — NITROGLYCERIN 5 MG/ML
100-200 VIAL (ML) INTRAVENOUS
Status: DISCONTINUED | OUTPATIENT
Start: 2017-07-19 | End: 2017-07-19 | Stop reason: HOSPADM

## 2017-07-19 RX ORDER — VERAPAMIL HYDROCHLORIDE 2.5 MG/ML
1-2.5 INJECTION, SOLUTION INTRAVENOUS
Status: COMPLETED | OUTPATIENT
Start: 2017-07-19 | End: 2017-07-19

## 2017-07-19 RX ORDER — PROMETHAZINE HYDROCHLORIDE 25 MG/ML
6.25-25 INJECTION, SOLUTION INTRAMUSCULAR; INTRAVENOUS EVERY 4 HOURS PRN
Status: DISCONTINUED | OUTPATIENT
Start: 2017-07-19 | End: 2017-07-19 | Stop reason: HOSPADM

## 2017-07-19 RX ORDER — DOPAMINE HYDROCHLORIDE 160 MG/100ML
2-20 INJECTION, SOLUTION INTRAVENOUS CONTINUOUS PRN
Status: DISCONTINUED | OUTPATIENT
Start: 2017-07-19 | End: 2017-07-19 | Stop reason: HOSPADM

## 2017-07-19 RX ORDER — PROTAMINE SULFATE 10 MG/ML
1-5 INJECTION, SOLUTION INTRAVENOUS
Status: DISCONTINUED | OUTPATIENT
Start: 2017-07-19 | End: 2017-07-19 | Stop reason: HOSPADM

## 2017-07-19 RX ORDER — POTASSIUM CHLORIDE 7.45 MG/ML
10 INJECTION INTRAVENOUS
Status: DISCONTINUED | OUTPATIENT
Start: 2017-07-19 | End: 2017-07-19 | Stop reason: HOSPADM

## 2017-07-19 RX ORDER — ENALAPRILAT 1.25 MG/ML
1.25-2.5 INJECTION INTRAVENOUS
Status: DISCONTINUED | OUTPATIENT
Start: 2017-07-19 | End: 2017-07-19 | Stop reason: HOSPADM

## 2017-07-19 RX ORDER — BUPIVACAINE HYDROCHLORIDE 2.5 MG/ML
1-10 INJECTION, SOLUTION EPIDURAL; INFILTRATION; INTRACAUDAL
Status: DISCONTINUED | OUTPATIENT
Start: 2017-07-19 | End: 2017-07-19 | Stop reason: HOSPADM

## 2017-07-19 RX ORDER — PROTAMINE SULFATE 10 MG/ML
25-100 INJECTION, SOLUTION INTRAVENOUS EVERY 5 MIN PRN
Status: DISCONTINUED | OUTPATIENT
Start: 2017-07-19 | End: 2017-07-19 | Stop reason: HOSPADM

## 2017-07-19 RX ORDER — EPTIFIBATIDE 2 MG/ML
15 INJECTION, SOLUTION INTRAVENOUS CONTINUOUS PRN
Status: DISCONTINUED | OUTPATIENT
Start: 2017-07-19 | End: 2017-07-19 | Stop reason: HOSPADM

## 2017-07-19 RX ORDER — FLUMAZENIL 0.1 MG/ML
0.2 INJECTION, SOLUTION INTRAVENOUS
Status: DISCONTINUED | OUTPATIENT
Start: 2017-07-19 | End: 2017-07-19 | Stop reason: HOSPADM

## 2017-07-19 RX ORDER — EPTIFIBATIDE 2 MG/ML
180 INJECTION, SOLUTION INTRAVENOUS EVERY 10 MIN PRN
Status: DISCONTINUED | OUTPATIENT
Start: 2017-07-19 | End: 2017-07-19 | Stop reason: HOSPADM

## 2017-07-19 RX ORDER — ACETAMINOPHEN 325 MG/1
325-650 TABLET ORAL EVERY 4 HOURS PRN
Status: DISCONTINUED | OUTPATIENT
Start: 2017-07-19 | End: 2017-07-19 | Stop reason: HOSPADM

## 2017-07-19 RX ORDER — NITROGLYCERIN 20 MG/100ML
.07-1.59 INJECTION INTRAVENOUS CONTINUOUS PRN
Status: DISCONTINUED | OUTPATIENT
Start: 2017-07-19 | End: 2017-07-19 | Stop reason: HOSPADM

## 2017-07-19 RX ORDER — FUROSEMIDE 10 MG/ML
20-100 INJECTION INTRAMUSCULAR; INTRAVENOUS
Status: DISCONTINUED | OUTPATIENT
Start: 2017-07-19 | End: 2017-07-19 | Stop reason: HOSPADM

## 2017-07-19 RX ORDER — NITROGLYCERIN 0.4 MG/1
0.4 TABLET SUBLINGUAL EVERY 5 MIN PRN
Status: DISCONTINUED | OUTPATIENT
Start: 2017-07-19 | End: 2017-07-19 | Stop reason: HOSPADM

## 2017-07-19 RX ORDER — HYDRALAZINE HYDROCHLORIDE 20 MG/ML
10-20 INJECTION INTRAMUSCULAR; INTRAVENOUS
Status: DISCONTINUED | OUTPATIENT
Start: 2017-07-19 | End: 2017-07-19 | Stop reason: HOSPADM

## 2017-07-19 RX ORDER — DOBUTAMINE HYDROCHLORIDE 200 MG/100ML
2-20 INJECTION INTRAVENOUS CONTINUOUS PRN
Status: DISCONTINUED | OUTPATIENT
Start: 2017-07-19 | End: 2017-07-19 | Stop reason: HOSPADM

## 2017-07-19 RX ORDER — POTASSIUM CHLORIDE 1500 MG/1
20 TABLET, EXTENDED RELEASE ORAL
Status: DISCONTINUED | OUTPATIENT
Start: 2017-07-19 | End: 2017-07-19 | Stop reason: HOSPADM

## 2017-07-19 RX ORDER — NALOXONE HYDROCHLORIDE 0.4 MG/ML
.2-.4 INJECTION, SOLUTION INTRAMUSCULAR; INTRAVENOUS; SUBCUTANEOUS
Status: DISCONTINUED | OUTPATIENT
Start: 2017-07-19 | End: 2017-07-19 | Stop reason: HOSPADM

## 2017-07-19 RX ORDER — IOPAMIDOL 755 MG/ML
80 INJECTION, SOLUTION INTRAVASCULAR ONCE
Status: COMPLETED | OUTPATIENT
Start: 2017-07-19 | End: 2017-07-19

## 2017-07-19 RX ORDER — CLOPIDOGREL BISULFATE 75 MG/1
300-600 TABLET ORAL
Status: DISCONTINUED | OUTPATIENT
Start: 2017-07-19 | End: 2017-07-19 | Stop reason: HOSPADM

## 2017-07-19 RX ORDER — ATROPINE SULFATE 0.1 MG/ML
0.5 INJECTION INTRAVENOUS EVERY 5 MIN PRN
Status: DISCONTINUED | OUTPATIENT
Start: 2017-07-19 | End: 2017-07-19 | Stop reason: HOSPADM

## 2017-07-19 RX ADMIN — NITROGLYCERIN 200 MCG: 5 INJECTION, SOLUTION INTRAVENOUS at 09:31

## 2017-07-19 RX ADMIN — VERAPAMIL HYDROCHLORIDE 2.5 MG: 2.5 INJECTION, SOLUTION INTRAVENOUS at 08:57

## 2017-07-19 RX ADMIN — IOPAMIDOL 80 ML: 755 INJECTION, SOLUTION INTRAVASCULAR at 09:45

## 2017-07-19 RX ADMIN — Medication 5000 UNITS: at 08:56

## 2017-07-19 RX ADMIN — MIDAZOLAM HYDROCHLORIDE 1 MG: 1 INJECTION, SOLUTION INTRAMUSCULAR; INTRAVENOUS at 08:58

## 2017-07-19 RX ADMIN — NITROGLYCERIN 200 MCG: 5 INJECTION, SOLUTION INTRAVENOUS at 08:56

## 2017-07-19 RX ADMIN — LIDOCAINE HYDROCHLORIDE 10 MG: 10 INJECTION, SOLUTION EPIDURAL; INFILTRATION; INTRACAUDAL; PERINEURAL at 08:53

## 2017-07-19 RX ADMIN — FENTANYL CITRATE 50 MCG: 50 INJECTION, SOLUTION INTRAMUSCULAR; INTRAVENOUS at 08:58

## 2017-07-19 RX ADMIN — SODIUM CHLORIDE: 9 INJECTION, SOLUTION INTRAVENOUS at 07:29

## 2017-07-19 RX ADMIN — ADENOSINE 200 MCG: 3 INJECTION, SOLUTION INTRAVENOUS at 09:30

## 2017-07-19 NOTE — DISCHARGE INSTRUCTIONS
Cardiac Angiogram Discharge Instructions - Radial    After you go home:      Have an adult stay with you until tomorrow.    Drink extra fluids for 2 days.    You may resume your normal diet.    No smoking       For 24 hours - due to the sedation you received:    Relax and take it easy.    Do NOT make any important or legal decisions.    Do NOT drive or operate machines at home or at work.    Do NOT drink alcohol.    Care of Wrist Puncture Site:      For the first 24 hrs - check the puncture site every 1-2 hours while awake.    It is normal to have soreness at the puncture site and mild tingling in your hand for up to 3 days.    Remove the bandaid after 24 hours. If there is minor oozing, apply another bandaid and remove it after 12 hours.    You may shower tomorrow.  Do NOT take a bath, or use a hot tub or pool for at least 3 days. Do NOT scrub the site. No dishes. Do not use lotion or powder near the puncture site.           Activity:        For 2 days:     do not use your hand or arm to support your weight (such as rising from a chair)     do not bend your wrist (such as lifting a garage door).    do not lift more than 5 pounds or exercise your arm (such as tennis, golf or bowling).    Do NOT do any heavy activity such as exercise, lifting, or straining.     Bleeding:      If you start bleeding from the site in your wrist, sit down and press firmly on/above the site for 10 minutes.     Once bleeding stops, keep arm still for 2 hours.     Call [facility] as soon as you can.       Call 911 right away if you have heavy bleeding or bleeding that does not stop.      Medicines:      If you are taking antiplatelet medications such as Plavix, Brilinta or Effient, do not stop taking it until you talk to your cardiologist.        Take your medications, including blood thinners, unless your provider tells you not to.  If you take Coumadin (Warfarin), have your INR checked by your provider in  3-5 days. Call your clinic to  schedule this.    If you have stopped any medicines, check with your provider about when to restart them.    Follow Up Appointments:      Follow up with [facility] Nurse Practitioner at [facility] of patient preference in 7-10 days.    Call the clinic if:      You have a large or growing hard lump around the site.    The site is red, swollen, hot or tender.    Blood or fluid is draining from the site.    You have chills or a fever greater than 101 F (38 C).    Your arm turns feels numb, cool or changes color.    You have hives, a rash or unusual itching.    Any questions or concerns.          [facility] [place]:    [phone] [facility] (7 days a week)

## 2017-07-19 NOTE — IP AVS SNAPSHOT
[place] [address]    [place] [address]    Phone:  [phone]                                       After Visit Summary   7/19/2017    Moira Andre    MRN: [id number]           After Visit Summary Signature Page     I have received my discharge instructions, and my questions have been answered. I have discussed any challenges I see with this plan with the nurse or doctor.    ..........................................................................................................................................  Patient/Patient Representative Signature      ..........................................................................................................................................  Patient Representative Print Name and Relationship to Patient    ..................................................               ................................................  Date                                            Time    ..........................................................................................................................................  Reviewed by Signature/Title    ...................................................              ..............................................  Date                                                            Time

## 2017-07-19 NOTE — PROGRESS NOTES
Pt admitted for heart cath, pt watching cath lab video, questions answered and daughter in law at bedside. Denies pain on admission.

## 2017-07-19 NOTE — PROGRESS NOTES
Air coming out of TR with no bleeding/swelling. Tolerated lunch. VSS. Discharge instructions done with pt and daughter in law. States understanding.    1230 Up to bathroom to void. IV DCD. Right wrist remains D/I with no bleeding/swelling. Discharged to door #3 with daughter in law and instructions.

## 2017-07-19 NOTE — IP AVS SNAPSHOT
MRN:[id number]                      After Visit Summary   7/19/2017    Moira Andre    MRN: [id number]           Visit Information        Department      7/19/2017  6:26 AM [facility]          Review of your medicines      CONTINUE these medicines which have NOT CHANGED        Dose / Directions    acetaminophen 325 MG tablet   Commonly known as:  TYLENOL        Dose:  325-650 mg   Take 325-650 mg by mouth every 6 hours as needed for mild pain   Refills:  0       ADVAIR DISKUS 100-50 MCG/DOSE diskus inhaler   Used for:  Intermittent asthma, uncomplicated   Generic drug:  fluticasone-salmeterol        INHALE 1 PUFF EVERY 12 HOURS   Quantity:  60 Inhaler   Refills:  3       albuterol 108 (90 BASE) MCG/ACT Inhaler   Commonly known as:  PROAIR HFA/PROVENTIL HFA/VENTOLIN HFA   Used for:  Intermittent asthma        Dose:  2 puff   Inhale 2 puffs into the lungs every 6 hours as needed for shortness of breath / dyspnea   Quantity:  1 Inhaler   Refills:  0       atorvastatin 20 MG tablet   Commonly known as:  LIPITOR   Used for:  Hyperlipidemia LDL goal <70        Dose:  20 mg   Take 1 tablet (20 mg) by mouth daily   Quantity:  90 tablet   Refills:  3       * blood glucose monitoring meter device kit   Commonly known as:  no brand specified   Used for:  Type 2 diabetes mellitus with diabetic nephropathy (H)        Use to test blood sugar 1-2 times daily or as directed.   Quantity:  1 kit   Refills:  0       * TRUE METRIX AIR GLUCOSE METER W/DEVICE Kit   Used for:  Type 2 diabetes mellitus with diabetic nephropathy (H)        USE AS DIRECTED   Quantity:  1 kit   Refills:  0       blood glucose monitoring test strip   Commonly known as:  no brand specified   Used for:  Type 2 diabetes mellitus with diabetic nephropathy (H)        Use to test blood sugars 1-2 times daily or as directed   Quantity:  100 each   Refills:  3       calcium-vitamin D 600-400 MG-UNIT per tablet   Commonly known  as:  CALTRATE        Dose:  1 tablet   Take 1 tablet by mouth daily   Refills:  0       clopidogrel 75 MG tablet   Commonly known as:  PLAVIX   Used for:  Coronary artery disease involving native coronary artery of native heart without angina pectoris        Dose:  75 mg   Take 1 tablet (75 mg) by mouth daily   Quantity:  90 tablet   Refills:  3       DULoxetine 30 MG EC capsule   Commonly known as:  CYMBALTA   Used for:  Major depressive disorder, recurrent episode, moderate (H)        Dose:  30 mg   Take 1 capsule (30 mg) by mouth daily   Quantity:  90 capsule   Refills:  1       FEXOFENADINE HCL PO        Dose:  180 mg   Take 180 mg by mouth daily.   Refills:  0       FLONASE 50 MCG/ACT spray   Generic drug:  fluticasone        Dose:  1 spray   Spray 1 spray into both nostrils as needed   Refills:  0       Glucosamine HCl 750 MG Tabs        Dose:  1 tablet   Take 1 tablet by mouth 2 times daily   Refills:  0       HAIR/SKIN/NAILS Tabs        Dose:  1 tablet   Take 1 tablet by mouth daily.   Refills:  0       HYDROcodone-acetaminophen 5-325 MG per tablet   Commonly known as:  NORCO   Used for:  Multiple joint pain        Dose:  1 tablet   Take 1 tablet by mouth every 8 hours as needed for moderate to severe pain   Quantity:  90 tablet   Refills:  0       IBANdronate 150 MG tablet   Commonly known as:  BONIVA   Used for:  Osteopenia        Dose:  150 mg   Take 1 tablet (150 mg) by mouth every 30 days   Quantity:  3 tablet   Refills:  3       lisinopril 10 MG tablet   Commonly known as:  PRINIVIL/ZESTRIL   Used for:  Essential hypertension with goal blood pressure less than 140/90        Dose:  10 mg   Take 1 tablet (10 mg) by mouth daily   Quantity:  90 tablet   Refills:  1       metoprolol 100 MG 24 hr tablet   Commonly known as:  TOPROL-XL   Used for:  Essential hypertension with goal blood pressure less than 140/90        Dose:  100 mg   Take 1 tablet (100 mg) by mouth daily   Quantity:  90 tablet   Refills:  3        NITROQUICK SL        Dose:  0.4 mg   Place 0.4 mg under the tongue every 5 minutes as needed   Refills:  0       nystatin-triamcinolone cream   Commonly known as:  MYCOLOG II   Used for:  Tinea of the body        Apply topically 2 times daily   Quantity:  60 g   Refills:  1       order for DME        DREAMSTATION 5-15 CM/H20 NASAL WISP FABRIC   Refills:  0       TRUEPLUS LANCETS 28G Misc   Used for:  Type 2 diabetes mellitus with diabetic nephropathy (H)        Dose:  1 each   1 each 2 times daily as needed   Quantity:  100 each   Refills:  3       TURMERIC PO        Refills:  0       vitamin D 2000 UNITS Caps        Take by mouth daily   Refills:  0       * Notice:  This list has 2 medication(s) that are the same as other medications prescribed for you. Read the directions carefully, and ask your doctor or other care provider to review them with you.             Protect others around you: Learn how to safely use, store and throw away your medicines at www.disposemymeds.org.         Follow-ups after your visit        Your next 10 appointments already scheduled     Aug 01, 2017  7:30 AM CDT   Return Visit with YOHANNES Queen CNP   [facility] [place] ([facility])    [address] [place] [id number] [phone]            Aug 15, 2017  9:00 AM CDT   Office Visit with Maryan Churchill MD   [facility]    [address] [place] [id number] [phone]           Bring a current list of meds and any records pertaining to this visit.  For Physicals, please bring immunization records and any forms needing to be filled out.  Please arrive 10 minutes early to complete paperwork.               Care Instructions        After Care Instructions     Discharge Instructions - IF on Metformin (Glucophage or Glucovance) or Metformin containing medications       IF on Metformin (Glucophage or Glucovance)  or Metformin containing medications , schedule a Basic Metabolic Panel at [facility] or Primary Clinic in 48 - 72 hours post procedure and PRIOR TO resuming the Metformin or Metformin containing medications.  Hold Metformin (Glucophage or Glucovance) or Metformin containing medications until after the Basic Metabolic Panel on the 2nd or 3rd day following the procedure.  May resume after blood draw is complete.                  Further instructions from your care team       Cardiac Angiogram Discharge Instructions - Radial    After you go home:      Have an adult stay with you until tomorrow.    Drink extra fluids for 2 days.    You may resume your normal diet.    No smoking       For 24 hours - due to the sedation you received:    Relax and take it easy.    Do NOT make any important or legal decisions.    Do NOT drive or operate machines at home or at work.    Do NOT drink alcohol.    Care of Wrist Puncture Site:      For the first 24 hrs - check the puncture site every 1-2 hours while awake.    It is normal to have soreness at the puncture site and mild tingling in your hand for up to 3 days.    Remove the bandaid after 24 hours. If there is minor oozing, apply another bandaid and remove it after 12 hours.    You may shower tomorrow.  Do NOT take a bath, or use a hot tub or pool for at least 3 days. Do NOT scrub the site. No dishes. Do not use lotion or powder near the puncture site.           Activity:        For 2 days:     do not use your hand or arm to support your weight (such as rising from a chair)     do not bend your wrist (such as lifting a garage door).    do not lift more than 5 pounds or exercise your arm (such as tennis, golf or bowling).    Do NOT do any heavy activity such as exercise, lifting, or straining.     Bleeding:      If you start bleeding from the site in your wrist, sit down and press firmly on/above the site for 10 minutes.     Once bleeding stops, keep arm still for 2 hours.     Call [facility]  "Clinic as soon as you can.       Call 911 right away if you have heavy bleeding or bleeding that does not stop.      Medicines:      If you are taking antiplatelet medications such as Plavix, Brilinta or Effient, do not stop taking it until you talk to your cardiologist.        Take your medications, including blood thinners, unless your provider tells you not to.  If you take Coumadin (Warfarin), have your INR checked by your provider in  3-5 days. Call your clinic to schedule this.    If you have stopped any medicines, check with your provider about when to restart them.    Follow Up Appointments:      Follow up with [facility] Nurse Practitioner at [facility] of patient preference in 7-10 days.    Call the clinic if:      You have a large or growing hard lump around the site.    The site is red, swollen, hot or tender.    Blood or fluid is draining from the site.    You have chills or a fever greater than 101 F (38 C).    Your arm turns feels numb, cool or changes color.    You have hives, a rash or unusual itching.    Any questions or concerns.          [facility] at [place]:    [phone] [facility] (7 days a week)             Additional Information About Your Visit        "[employer] Information     "[employer]hart lets you send messages to your doctor, view your test results, renew your prescriptions, schedule appointments and more. To sign up, go to [email or web address] . Click on \"Log in\" on the left side of the screen, which will take you to the Welcome page. Then click on \"Sign up Now\" on the right side of the page.     You will be asked to enter the access code listed below, as well as some personal information. Please follow the directions to create your username and password.     Your access code is: [email or web address]  Expires: 10/5/2017  3:22 PM     Your access code will  in 90 days. If you need help or a new code, please call your [place] clinic or [phone].        Care " "EveryWhere ID     This is your Care EveryWhere ID. This could be used by other organizations to access your [facility] medical records  [id number]        Your Vitals Were     Blood Pressure Pulse Temperature Respirations Height Weight    138/68 63 98.4  F (36.9  C) (Oral) 16 1.702 m (5' 7\") 125.5 kg (276 lb 11.2 oz)    Pulse Oximetry BMI (Body Mass Index)                96% 43.34 kg/m2           Primary Care Provider Office Phone # Fax #    Maryan Churchill [phone]      Equal Access to Services     [facility]: Hadii aad ku hadhany Grey, waaxda liza, qaybta kaalmada gisella, andreina shaw . So [facility] [phone].    ATENCIÓN: Si habla español, tiene a gold disposición servicios gratuitos de asistencia lingüística. [facility] [phone].    We comply with applicable federal civil rights laws and [place] laws. We do not discriminate on the basis of race, color, national origin, age, disability sex, sexual orientation or gender identity.            Thank you!     Thank you for choosing [facility] for your care. Our goal is always to provide you with excellent care. Hearing back from our patients is one way we can continue to improve our services. Please take a few minutes to complete the written survey that you may receive in the mail after you visit with us. Thank you!             Medication List: This is a list of all your medications and when to take them. Check marks below indicate your daily home schedule. Keep this list as a reference.      Medications           Morning Afternoon Evening Bedtime As Needed    acetaminophen 325 MG tablet   Commonly known as:  TYLENOL   Take 325-650 mg by mouth every 6 hours as needed for mild pain                                ADVAIR DISKUS 100-50 MCG/DOSE diskus inhaler   INHALE 1 PUFF EVERY 12 HOURS   Generic drug:  fluticasone-salmeterol                                albuterol 108 (90 BASE) MCG/ACT Inhaler   Commonly " known as:  PROAIR HFA/PROVENTIL HFA/VENTOLIN HFA   Inhale 2 puffs into the lungs every 6 hours as needed for shortness of breath / dyspnea                                atorvastatin 20 MG tablet   Commonly known as:  LIPITOR   Take 1 tablet (20 mg) by mouth daily                                * blood glucose monitoring meter device kit   Commonly known as:  no brand specified   Use to test blood sugar 1-2 times daily or as directed.                                * TRUE METRIX AIR GLUCOSE METER W/DEVICE Kit   USE AS DIRECTED                                blood glucose monitoring test strip   Commonly known as:  no brand specified   Use to test blood sugars 1-2 times daily or as directed                                calcium-vitamin D 600-400 MG-UNIT per tablet   Commonly known as:  CALTRATE   Take 1 tablet by mouth daily                                clopidogrel 75 MG tablet   Commonly known as:  PLAVIX   Take 1 tablet (75 mg) by mouth daily                                DULoxetine 30 MG EC capsule   Commonly known as:  CYMBALTA   Take 1 capsule (30 mg) by mouth daily                                FEXOFENADINE HCL PO   Take 180 mg by mouth daily.                                FLONASE 50 MCG/ACT spray   Spray 1 spray into both nostrils as needed   Generic drug:  fluticasone                                Glucosamine HCl 750 MG Tabs   Take 1 tablet by mouth 2 times daily                                HAIR/SKIN/NAILS Tabs   Take 1 tablet by mouth daily.                                HYDROcodone-acetaminophen 5-325 MG per tablet   Commonly known as:  NORCO   Take 1 tablet by mouth every 8 hours as needed for moderate to severe pain                                IBANdronate 150 MG tablet   Commonly known as:  BONIVA   Take 1 tablet (150 mg) by mouth every 30 days                                lisinopril 10 MG tablet   Commonly known as:  PRINIVIL/ZESTRIL   Take 1 tablet (10 mg) by mouth daily                                 metoprolol 100 MG 24 hr tablet   Commonly known as:  TOPROL-XL   Take 1 tablet (100 mg) by mouth daily                                NITROQUICK SL   Place 0.4 mg under the tongue every 5 minutes as needed                                nystatin-triamcinolone cream   Commonly known as:  MYCOLOG II   Apply topically 2 times daily                                order for DME   DREAMSTATION 5-15 CM/H20 NASAL WISP FABRIC                                TRUEPLUS LANCETS 28G Misc   1 each 2 times daily as needed                                TURMERIC PO                                vitamin D 2000 UNITS Caps   Take by mouth daily                                * Notice:  This list has 2 medication(s) that are the same as other medications prescribed for you. Read the directions carefully, and ask your doctor or other care provider to review them with you.

## 2017-07-19 NOTE — PROGRESS NOTES
Back to room post procedure. Pain 2/10 in right wrist. Oxygen sats 90-92% on room air. Alert and talking with daughter in law at bedside. TR band in place on right wrist without bleeding. Normal pulse. Instructed on limiting use of right arm. Water and coffee given. Dr Solano in room with update on cath lab results.

## 2017-07-20 LAB — INTERPRETATION ECG - MUSE: NORMAL

## 2017-08-01 ENCOUNTER — OFFICE VISIT (OUTPATIENT)
Dept: CARDIOLOGY | Facility: CLINIC | Age: 82
End: 2017-08-01
Attending: NURSE PRACTITIONER
Payer: COMMERCIAL

## 2017-08-01 VITALS
WEIGHT: 279 LBS | SYSTOLIC BLOOD PRESSURE: 129 MMHG | BODY MASS INDEX: 43.79 KG/M2 | DIASTOLIC BLOOD PRESSURE: 80 MMHG | HEART RATE: 76 BPM | HEIGHT: 67 IN

## 2017-08-01 DIAGNOSIS — I25.10 CORONARY ARTERY DISEASE INVOLVING NATIVE CORONARY ARTERY OF NATIVE HEART, ANGINA PRESENCE UNSPECIFIED: Primary | ICD-10-CM

## 2017-08-01 DIAGNOSIS — I25.10 CORONARY ARTERY DISEASE INVOLVING NATIVE CORONARY ARTERY OF NATIVE HEART, ANGINA PRESENCE UNSPECIFIED: ICD-10-CM

## 2017-08-01 DIAGNOSIS — R00.2 PALPITATION: ICD-10-CM

## 2017-08-01 DIAGNOSIS — R42 DIZZINESS: ICD-10-CM

## 2017-08-01 DIAGNOSIS — R94.39 ABNORMAL STRESS TEST: ICD-10-CM

## 2017-08-01 LAB
ANION GAP SERPL CALCULATED.3IONS-SCNC: 8.4 MMOL/L (ref 6–17)
BUN SERPL-MCNC: 19 MG/DL (ref 7–30)
CALCIUM SERPL-MCNC: 9.5 MG/DL (ref 8.5–10.5)
CHLORIDE SERPL-SCNC: 107 MMOL/L (ref 98–107)
CO2 SERPL-SCNC: 24 MMOL/L (ref 23–29)
CREAT SERPL-MCNC: 1.24 MG/DL (ref 0.7–1.3)
GFR SERPL CREATININE-BSD FRML MDRD: 41 ML/MIN/1.7M2
GLUCOSE SERPL-MCNC: 179 MG/DL (ref 70–105)
POTASSIUM SERPL-SCNC: 4.4 MMOL/L (ref 3.5–5.1)
SODIUM SERPL-SCNC: 135 MMOL/L (ref 136–145)

## 2017-08-01 PROCEDURE — 80048 BASIC METABOLIC PNL TOTAL CA: CPT | Performed by: NURSE PRACTITIONER

## 2017-08-01 PROCEDURE — 99214 OFFICE O/P EST MOD 30 MIN: CPT | Performed by: NURSE PRACTITIONER

## 2017-08-01 PROCEDURE — 36415 COLL VENOUS BLD VENIPUNCTURE: CPT | Performed by: NURSE PRACTITIONER

## 2017-08-01 NOTE — MR AVS SNAPSHOT
After Visit Summary   8/1/2017    Moira Andre    MRN: [id number]           Patient Information     Date Of Birth          9/24/1933        Visit Information        Provider Department      8/1/2017 7:30 AM Nickolas Mott APRN CNP [facility] [place]        Today's Diagnoses     Coronary artery disease involving native coronary artery of native heart, angina presence unspecified        Dizziness        Abnormal stress test        Palpitation          Care Instructions    Check labs today to make sure kidney functions are okay after the dye you had for your procedure    See us in one year with echocardiogram of your heart          Follow-ups after your visit        Additional Services     Follow-Up with Cardiologist                 Your next 10 appointments already scheduled     Aug 15, 2017  9:00 AM CDT   [facility]    [address] [facility] [address] [phone]           Bring a current list of meds and any records pertaining to this visit. For Physicals, please bring immunization records and any forms needing to be filled out. Please arrive 10 minutes early to complete paperwork.              Future tests that were ordered for you today     Open Future Orders        Priority Expected Expires Ordered    Echocardiogram Routine 8/1/2018 8/2/2018 8/1/2017    Follow-Up with Cardiologist Routine 8/1/2018 8/2/2018 8/1/2017    Basic metabolic panel Routine 8/1/2017 8/1/2018 8/1/2017            Who to contact     If you have questions or need follow up information about today's clinic visit or your schedule please contact [facility] directly at [phone].  Normal or non-critical lab and imaging results will be communicated to you by MyChart, letter or phone within 4 business days after the clinic has received the results. If you do  "not hear from us within 7 days, please contact the clinic through [employer] or phone. If you have a critical or abnormal lab result, we will notify you by phone as soon as possible.  Submit refill requests through [employer] or call your pharmacy and they will forward the refill request to us. Please allow 3 business days for your refill to be completed.          Additional Information About Your Visit        [employer] Information     [employer] lets you send messages to your doctor, view your test results, renew your prescriptions, schedule appointments and more. To sign up, go to [email or web address] . Click on \"Log in\" on the left side of the screen, which will take you to the Welcome page. Then click on \"Sign up Now\" on the right side of the page.     You will be asked to enter the access code listed below, as well as some personal information. Please follow the directions to create your username and password.     Your access code is: [email or web address]  Expires: 10/5/2017  3:22 PM     Your access code will  in 90 days. If you need help or a new code, please call your [facility] clinic or [phone].        Care EveryWhere ID     This is your Care EveryWhere ID. This could be used by other organizations to access your [facility] medical records  [id number]        Your Vitals Were     Pulse Height BMI (Body Mass Index)             76 1.702 m (5' 7\") 43.7 kg/m2          Blood Pressure from Last 3 Encounters:   17 129/80   17 109/73   17 130/68    Weight from Last 3 Encounters:   17 126.6 kg (279 lb)   17 125.5 kg (276 lb 11.2 oz)   17 126.1 kg (278 lb)              We Performed the Following     Follow-Up with Cardiac Advanced Practice Provider        Primary Care Provider Office Phone # Fax #    Maryan Churchill [phone]       [facility]    [address]  [facility] [address]        Equal Access to Services     HUMA AGUILAR AH: Hadii " gissel Grey, wanapoleonda jazzmineadaha, qaybta kaaljulia obando, andreina idiin hayfrancieyamila florencege obduliasoumyafariha waltonninoyamila tarik So [facility] [phone].    ATENCIÓN: Si habla stevenañol, tiene a gold disposición servicios gratuitos de asistencia lingüística. Pilyame al [phone].    We comply with applicable federal civil rights laws and [place] laws. We do not discriminate on the basis of race, color, national origin, age, disability sex, sexual orientation or gender identity.            Thank you!     Thank you for choosing [facility] [place]  for your care. Our goal is always to provide you with excellent care. Hearing back from our patients is one way we can continue to improve our services. Please take a few minutes to complete the written survey that you may receive in the mail after your visit with us. Thank you!             Your Updated Medication List - Protect others around you: Learn how to safely use, store and throw away your medicines at www.disposemymeds.org.          This list is accurate as of: 8/1/17  7:47 AM.  Always use your most recent med list.                   Brand Name Dispense Instructions for use Diagnosis    acetaminophen 325 MG tablet    TYLENOL     Take 325-650 mg by mouth every 6 hours as needed for mild pain        ADVAIR DISKUS 100-50 MCG/DOSE diskus inhaler   Generic drug:  fluticasone-salmeterol     60 Inhaler    INHALE 1 PUFF EVERY 12 HOURS    Intermittent asthma, uncomplicated       albuterol 108 (90 BASE) MCG/ACT Inhaler    PROAIR HFA/PROVENTIL HFA/VENTOLIN HFA    1 Inhaler    Inhale 2 puffs into the lungs every 6 hours as needed for shortness of breath / dyspnea    Intermittent asthma       atorvastatin 20 MG tablet    LIPITOR    90 tablet    Take 1 tablet (20 mg) by mouth daily    Hyperlipidemia LDL goal <70       * blood glucose monitoring meter device kit    no brand specified    1 kit    Use to test blood sugar 1-2 times daily or as directed.    Type 2  diabetes mellitus with diabetic nephropathy (H)       * TRUE METRIX AIR GLUCOSE METER W/DEVICE Kit     1 kit    USE AS DIRECTED    Type 2 diabetes mellitus with diabetic nephropathy (H)       blood glucose monitoring test strip    no brand specified    100 each    Use to test blood sugars 1-2 times daily or as directed    Type 2 diabetes mellitus with diabetic nephropathy (H)       calcium-vitamin D 600-400 MG-UNIT per tablet    CALTRATE     Take 1 tablet by mouth daily        clopidogrel 75 MG tablet    PLAVIX    90 tablet    Take 1 tablet (75 mg) by mouth daily    Coronary artery disease involving native coronary artery of native heart without angina pectoris       DULoxetine 30 MG EC capsule    CYMBALTA    90 capsule    Take 1 capsule (30 mg) by mouth daily    Major depressive disorder, recurrent episode, moderate (H)       FEXOFENADINE HCL PO      Take 180 mg by mouth daily.        FLONASE 50 MCG/ACT spray   Generic drug:  fluticasone      Spray 1 spray into both nostrils as needed        Glucosamine HCl 750 MG Tabs      Take 1 tablet by mouth 2 times daily        HAIR/SKIN/NAILS Tabs      Take 1 tablet by mouth daily.        HYDROcodone-acetaminophen 5-325 MG per tablet    NORCO    90 tablet    Take 1 tablet by mouth every 8 hours as needed for moderate to severe pain    Multiple joint pain       IBANdronate 150 MG tablet    BONIVA    3 tablet    Take 1 tablet (150 mg) by mouth every 30 days    Osteopenia       lisinopril 10 MG tablet    PRINIVIL/ZESTRIL    90 tablet    Take 1 tablet (10 mg) by mouth daily    Essential hypertension with goal blood pressure less than 140/90       metoprolol 100 MG 24 hr tablet    TOPROL-XL    90 tablet    Take 1 tablet (100 mg) by mouth daily    Essential hypertension with goal blood pressure less than 140/90       NITROQUICK SL      Place 0.4 mg under the tongue every 5 minutes as needed        nystatin-triamcinolone cream    MYCOLOG II    60 g    Apply topically 2 times daily     Tinea of the body       order for DME      DREAMSTATION 5-15 CM/H20 NASAL WISP FABRIC        TRUEPLUS LANCETS 28G Misc     100 each    1 each 2 times daily as needed    Type 2 diabetes mellitus with diabetic nephropathy (H)       TURMERIC PO           vitamin D 2000 UNITS Caps      Take by mouth daily        * Notice:  This list has 2 medication(s) that are the same as other medications prescribed for you. Read the directions carefully, and ask your doctor or other care provider to review them with you.

## 2017-08-01 NOTE — PATIENT INSTRUCTIONS
Check labs today to make sure kidney functions are okay after the dye you had for your procedure    See us in one year with echocardiogram of your heart

## 2017-08-01 NOTE — PROGRESS NOTES
History of present illness:    Moira Andre is an 83-year-old female followed here by Dr. Sinha. She returns today following an angiogram which was done for episodes of heart pounding and a decline in her functional status with nuclear stress testing showing inferior lateral area of ischemia.    Moira has known coronary artery disease with bare metal stents to the obtuse marginal and diagonal branches in 2007. She had a femoral pseudoaneurysm occur after that procedure which was surgically repaired. In 2013, she underwent an angiogram showing patent stents and symptoms at that time were attributed to gastroesophageal reflux.    Recent coronary angiogram shows a 50-60% proximal circumflex lesion. The FFR was negative at 0.89; IFR was negative at 0.96. Therefore, this lesions were deemed to be non-flow limiting. Other vessels had 30% stenosis or lasts.    Echocardiography done in June revealed left ventricular hypertrophy with a small left ventricular size. This was a bit indicative of LVOT gradients being increased however they were not measured. I will repeat an echocardiogram in 1 year to quantify this. Images were sub-optimal and aorta appeared to measure 4.1 cm, however on LORENA was less than 3 cm and again images were suboptimal.    Moira is allergic to aspirin and has hives. She remains on long-term Plavix.    She has stage III chronic kidney disease with a preprocedure creatinine of 1.06. Hypo-repeat a basic metabolic panel today post I low to reassess    Lipids are controlled with an LDL of 55 HDL 50 drawn on Lipitor 20 mg per day. Triglycerides are slightly elevated at 244. She is a diabetic with last A1c 6.7%    Event recorder was performed given her palpitations showing atrial couplets PACs no other significant arrhythmias.    Moira states she's been feeling well. Her lungs are clear. She has a soft systolic murmur. Her right radial artery has healed nicely with no hum or bruit. She's had one fleeting  episode of sharp discomfort under her left breast that subsided no other complaints of chest pain or significant shortness of breath.    Impression and plan:    1. Coronary artery disease with previous obtuse marginal and diagonal stenting in 2007 with recent nuclear stress test indicating inferolateral wall ischemia and the patient's history indicated functional decline. Repeat coronary angiogram shows nonflow limiting disease. The patient has been feeling well since her last office visit. I would continue medical therapy including Plavix as she is allergic to aspirin. LV function is preserved.  2. Echocardiogram indicates the possibility of increased left ventricular outflow tract gradients. She has no clinical history of syncope or significant dizziness. Blood pressure is reasonable. I will repeat an echocardiogram in 1 year  3. Dyslipidemia reasonable control continue Lipitor 20 mg per day.  4. Hypertension-controlled. Continue lisinopril metoprolol.  5. Type 2 diabetes A1c 6.7%  5. Morbid obesity  6. Known sleep apnea recently has not been wearing CPAP and has complaints of fatigue. I've encouraged her to wear CPAP on a daily basis.    It has been a pleasure seeing Moira and follow-up today I will await her basic metabolic panel and if normal we will see her back in one year      Orders Placed This Encounter   Procedures     Basic metabolic panel     Follow-Up with Cardiologist     Echocardiogram       No orders of the defined types were placed in this encounter.      There are no discontinued medications.      Encounter Diagnoses   Name Primary?     Coronary artery disease involving native coronary artery of native heart, angina presence unspecified      Dizziness      Abnormal stress test      Palpitation        CURRENT MEDICATIONS:  Current Outpatient Prescriptions   Medication Sig Dispense Refill     albuterol (PROAIR HFA/PROVENTIL HFA/VENTOLIN HFA) 108 (90 BASE) MCG/ACT Inhaler Inhale 2 puffs into the  lungs every 6 hours as needed for shortness of breath / dyspnea 1 Inhaler 0     TURMERIC PO        lisinopril (PRINIVIL/ZESTRIL) 10 MG tablet Take 1 tablet (10 mg) by mouth daily 90 tablet 1     HYDROcodone-acetaminophen (NORCO) 5-325 MG per tablet Take 1 tablet by mouth every 8 hours as needed for moderate to severe pain 90 tablet 0     nystatin-triamcinolone (MYCOLOG II) cream Apply topically 2 times daily 60 g 1     IBANdronate (BONIVA) 150 MG tablet Take 1 tablet (150 mg) by mouth every 30 days 3 tablet 3     ADVAIR DISKUS 100-50 MCG/DOSE diskus inhaler INHALE 1 PUFF EVERY 12 HOURS 60 Inhaler 3     DULoxetine (CYMBALTA) 30 MG capsule Take 1 capsule (30 mg) by mouth daily 90 capsule 1     metoprolol (TOPROL-XL) 100 MG 24 hr tablet Take 1 tablet (100 mg) by mouth daily 90 tablet 3     clopidogrel (PLAVIX) 75 MG tablet Take 1 tablet (75 mg) by mouth daily 90 tablet 3     atorvastatin (LIPITOR) 20 MG tablet Take 1 tablet (20 mg) by mouth daily 90 tablet 3     acetaminophen (TYLENOL) 325 MG tablet Take 325-650 mg by mouth every 6 hours as needed for mild pain       Glucosamine HCl 750 MG TABS Take 1 tablet by mouth 2 times daily       Cholecalciferol (VITAMIN D) 2000 UNITS CAPS Take by mouth daily       calcium-vitamin D (CALTRATE) 600-400 MG-UNIT per tablet Take 1 tablet by mouth daily        fluticasone (FLONASE) 50 MCG/ACT nasal spray Spray 1 spray into both nostrils as needed        Multiple Vitamins-Minerals (HAIR/SKIN/NAILS) TABS Take 1 tablet by mouth daily.        FEXOFENADINE HCL PO Take 180 mg by mouth daily.       Nitroglycerin (NITROQUICK SL) Place 0.4 mg under the tongue every 5 minutes as needed        Blood Glucose Monitoring Suppl (TRUE METRIX AIR GLUCOSE METER) W/DEVICE KIT USE AS DIRECTED 1 kit 0     order for DME DREAMSTATION  5-15 CM/H20  NASAL WISP FABRIC       blood glucose monitoring (NO BRAND SPECIFIED) meter device kit Use to test blood sugar 1-2 times daily or as directed. 1 kit 0     blood  glucose monitoring (NO BRAND SPECIFIED) test strip Use to test blood sugars 1-2 times daily or as directed 100 each 3     TRUEPLUS LANCETS 28G MISC 1 each 2 times daily as needed 100 each 3       ALLERGIES     Allergies   Allergen Reactions     Aspirin Hives     Reaction occurred during childhood.      Minocycline      Yellow Dye Allergy. Minocycline has Yellow Dye #10.     Yellow Dye Hives     Rxn to yellow tablet. Eyes swelled shut.        PAST MEDICAL HISTORY:  Past Medical History:   Diagnosis Date     Aortic valve sclerosis     heart murmur, no AS     Arrhythmia     PAT, PVC     Aspirin allergy     Plavix use long term     Asthma      CKD (chronic kidney disease) stage 3, GFR 30-59 ml/min     x 2007 atleast     Congestive heart failure, unspecified      Depression      Diabetes mellitus (H) 2010     Diastolic dysfunction, left ventricle 2013    grade 2, nl ef     HTN (hypertension)      Migraine headache      Myocardial infarction (H) 9/2007, cath 2013 ml    BMS: stent to OM, diag, nl EF, echo /C angia 2013 , f/u cath no lesion >40%     OA (osteoarthritis) of knee      Obesity      Rheumatoid arthritis flare (H)     prednisone     Sleep apnea     on CPAP (not using 2016)     TIA (transient ischaemic attack)      Ventral hernia, unspecified, without mention of obstruction or gangrene        PAST SURGICAL HISTORY:  Past Surgical History:   Procedure Laterality Date     APPENDECTOMY       BIOPSY BREAST      x2 -needle & lumpectomy-benign     CHOLECYSTECTOMY       CORONARY ANGIOGRAPHY ADULT ORDER  9/28/2007    Bare metal stent to OM1, Diagonal patent      CORONARY ANGIOGRAPHY ADULT ORDER  9/25/2007    [place] stent to Diagonal     HC LEFT HEART CATHETERIZATION  8/2013    Moderate CAD     HYSTERECTOMY TOTAL ABDOMINAL       ORTHOPEDIC SURGERY      knee replacement on right side (2006), Left side (2016)     RELEASE CARPAL TUNNEL      right and left     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       FAMILY  "HISTORY:  Family History   Problem Relation Age of Onset     Neurologic Disorder Mother      MS - at 60's     C.A.D. Father       at 8o's, ? prostate ca     Breast Cancer No family hx of      Cancer - colorectal No family hx of        SOCIAL HISTORY:  Social History     Social History     Marital status:      Spouse name: N/A     Number of children: N/A     Years of education: N/A     Social History Main Topics     Smoking status: Never Smoker     Smokeless tobacco: Never Used     Alcohol use 0.0 - 0.6 oz/week     0 - 1 Standard drinks or equivalent per week      Comment: rarely     Drug use: No     Sexual activity: Not Currently     Partners: Male     Other Topics Concern     Caffeine Concern No     Sleep Concern No     Stress Concern No     Weight Concern No     Special Diet No     Exercise Yes     walking, swimming x2 a week     Seat Belt Yes     Social History Narrative    , 1 step son    Work- clown for profession        Tobacco- none,smoked for couple months in     ETOH- occ 1/2 months    Diet coke- 2-3 cans/day    Exercise- swims 8 laps -3/week               Review of Systems:  Skin:  Negative       Eyes:  Positive for glasses    ENT:  Positive for nasal congestion    Respiratory:  Positive for shortness of breath;dyspnea on exertion;cough;wheezing asthma   Cardiovascular:    Positive for;fatigue when rolling over on L side, Vertigo  Gastroenterology: Negative      Genitourinary:  not assessed      Musculoskeletal:  Positive for joint pain    Neurologic:  Negative      Psychiatric:  Negative      Heme/Lymph/Imm:  Negative      Endocrine:  Positive for diabetes      Physical Exam:  Vitals: /80  Pulse 76  Ht 1.702 m (5' 7\")  Wt 126.6 kg (279 lb)  BMI 43.7 kg/m2    Constitutional:  cooperative, alert and oriented, well developed, well nourished, in no acute distress        Skin:  warm and dry to the touch, no apparent skin lesions or masses noted        Head:  " normocephalic, no masses or lesions        Eyes:  pupils equal and round;conjunctivae and lids unremarkable;sclera white        ENT:  no pallor or cyanosis, dentition good        Neck:  carotid pulses are full and equal bilaterally, JVP normal, no carotid bruit, no thyromegaly        Chest:  clear to auscultation;normal symmetry          Cardiac: regular rhythm;normal S1 and S2       grade 1;early systolic murmur          Abdomen:  abdomen soft obese difficult exam    Vascular:                                     DP pulses 2+--right radial artery site clean post angiogram    Extremities and Back:  no deformities, clubbing, cyanosis, erythema observed   bilateral LE edema;trace     hx lymphedema    Neurological:  no gross motor deficits;affect appropriate              CC  Liberty Sandra, APRN CNP  [facility]  [address]  [place] [id number]

## 2017-08-01 NOTE — LETTER
8/1/2017    Maryan Churchill MD  [facility]      [address]  [place] [address]    RE: Moira Andre       Dear Colleague,    I had the pleasure of seeing Moira Andre in the [facility].      History of present illness:    Moira Andre is an 83-year-old female followed here by Dr. Sinha. She returns today following an angiogram which was done for episodes of heart pounding and a decline in her functional status with nuclear stress testing showing inferior lateral area of ischemia.    Moira has known coronary artery disease with bare metal stents to the obtuse marginal and diagonal branches in 2007. She had a femoral pseudoaneurysm occur after that procedure which was surgically repaired. In 2013, she underwent an angiogram showing patent stents and symptoms at that time were attributed to gastroesophageal reflux.    Recent coronary angiogram shows a 50-60% proximal circumflex lesion. The FFR was negative at 0.89; IFR was negative at 0.96. Therefore, this lesions were deemed to be non-flow limiting. Other vessels had 30% stenosis or lasts.    Echocardiography done in June revealed left ventricular hypertrophy with a small left ventricular size. This was a bit indicative of LVOT gradients being increased however they were not measured. I will repeat an echocardiogram in 1 year to quantify this. Images were sub-optimal and aorta appeared to measure 4.1 cm, however on LORENA was less than 3 cm and again images were suboptimal.    Moira is allergic to aspirin and has hives. She remains on long-term Plavix.    She has stage III chronic kidney disease with a preprocedure creatinine of 1.06. Hypo-repeat a basic metabolic panel today post I low to reassess    Lipids are controlled with an LDL of 55 HDL 50 drawn on Lipitor 20 mg per day. Triglycerides are slightly elevated at 244. She is a diabetic with last A1c 6.7%    Event recorder was performed given her  palpitations showing atrial couplets PACs no other significant arrhythmias.    Moira states she's been feeling well. Her lungs are clear. She has a soft systolic murmur. Her right radial artery has healed nicely with no hum or bruit. She's had one fleeting episode of sharp discomfort under her left breast that subsided no other complaints of chest pain or significant shortness of breath.    Impression and plan:    1. Coronary artery disease with previous obtuse marginal and diagonal stenting in 2007 with recent nuclear stress test indicating inferolateral wall ischemia and the patient's history indicated functional decline. Repeat coronary angiogram shows nonflow limiting disease. The patient has been feeling well since her last office visit. I would continue medical therapy including Plavix as she is allergic to aspirin. LV function is preserved.  2. Echocardiogram indicates the possibility of increased left ventricular outflow tract gradients. She has no clinical history of syncope or significant dizziness. Blood pressure is reasonable. I will repeat an echocardiogram in 1 year  3. Dyslipidemia reasonable control continue Lipitor 20 mg per day.  4. Hypertension-controlled. Continue lisinopril metoprolol.  5. Type 2 diabetes A1c 6.7%  5. Morbid obesity  6. Known sleep apnea recently has not been wearing CPAP and has complaints of fatigue. I've encouraged her to wear CPAP on a daily basis.    It has been a pleasure seeing Moira and follow-up today I will await her basic metabolic panel and if normal we will see her back in one year      Orders Placed This Encounter   Procedures     Basic metabolic panel     Follow-Up with Cardiologist     Echocardiogram       No orders of the defined types were placed in this encounter.      There are no discontinued medications.      Encounter Diagnoses   Name Primary?     Coronary artery disease involving native coronary artery of native heart, angina presence unspecified       Dizziness      Abnormal stress test      Palpitation        CURRENT MEDICATIONS:  Current Outpatient Prescriptions   Medication Sig Dispense Refill     albuterol (PROAIR HFA/PROVENTIL HFA/VENTOLIN HFA) 108 (90 BASE) MCG/ACT Inhaler Inhale 2 puffs into the lungs every 6 hours as needed for shortness of breath / dyspnea 1 Inhaler 0     TURMERIC PO        lisinopril (PRINIVIL/ZESTRIL) 10 MG tablet Take 1 tablet (10 mg) by mouth daily 90 tablet 1     HYDROcodone-acetaminophen (NORCO) 5-325 MG per tablet Take 1 tablet by mouth every 8 hours as needed for moderate to severe pain 90 tablet 0     nystatin-triamcinolone (MYCOLOG II) cream Apply topically 2 times daily 60 g 1     IBANdronate (BONIVA) 150 MG tablet Take 1 tablet (150 mg) by mouth every 30 days 3 tablet 3     ADVAIR DISKUS 100-50 MCG/DOSE diskus inhaler INHALE 1 PUFF EVERY 12 HOURS 60 Inhaler 3     DULoxetine (CYMBALTA) 30 MG capsule Take 1 capsule (30 mg) by mouth daily 90 capsule 1     metoprolol (TOPROL-XL) 100 MG 24 hr tablet Take 1 tablet (100 mg) by mouth daily 90 tablet 3     clopidogrel (PLAVIX) 75 MG tablet Take 1 tablet (75 mg) by mouth daily 90 tablet 3     atorvastatin (LIPITOR) 20 MG tablet Take 1 tablet (20 mg) by mouth daily 90 tablet 3     acetaminophen (TYLENOL) 325 MG tablet Take 325-650 mg by mouth every 6 hours as needed for mild pain       Glucosamine HCl 750 MG TABS Take 1 tablet by mouth 2 times daily       Cholecalciferol (VITAMIN D) 2000 UNITS CAPS Take by mouth daily       calcium-vitamin D (CALTRATE) 600-400 MG-UNIT per tablet Take 1 tablet by mouth daily        fluticasone (FLONASE) 50 MCG/ACT nasal spray Spray 1 spray into both nostrils as needed        Multiple Vitamins-Minerals (HAIR/SKIN/NAILS) TABS Take 1 tablet by mouth daily.        FEXOFENADINE HCL PO Take 180 mg by mouth daily.       Nitroglycerin (NITROQUICK SL) Place 0.4 mg under the tongue every 5 minutes as needed        Blood Glucose Monitoring Suppl (TRUE METRIX AIR  GLUCOSE METER) W/DEVICE KIT USE AS DIRECTED 1 kit 0     order for DME DREAMSTATION  5-15 CM/H20  NASAL WISP FABRIC       blood glucose monitoring (NO BRAND SPECIFIED) meter device kit Use to test blood sugar 1-2 times daily or as directed. 1 kit 0     blood glucose monitoring (NO BRAND SPECIFIED) test strip Use to test blood sugars 1-2 times daily or as directed 100 each 3     TRUEPLUS LANCETS 28G MISC 1 each 2 times daily as needed 100 each 3       ALLERGIES     Allergies   Allergen Reactions     Aspirin Hives     Reaction occurred during childhood.      Minocycline      Yellow Dye Allergy. Minocycline has Yellow Dye #10.     Yellow Dye Hives     Rxn to yellow tablet. Eyes swelled shut.        PAST MEDICAL HISTORY:  Past Medical History:   Diagnosis Date     Aortic valve sclerosis     heart murmur, no AS     Arrhythmia     PAT, PVC     Aspirin allergy     Plavix use long term     Asthma      CKD (chronic kidney disease) stage 3, GFR 30-59 ml/min     x 2007 atleast     Congestive heart failure, unspecified      Depression      Diabetes mellitus (H) 2010     Diastolic dysfunction, left ventricle 2013    grade 2, nl ef     HTN (hypertension)      Migraine headache      Myocardial infarction (H) 9/2007, cath 2013 ml    BMS: stent to OM, diag, nl EF, echo /C angia 2013 , f/u cath no lesion >40%     OA (osteoarthritis) of knee      Obesity      Rheumatoid arthritis flare (H)     prednisone     Sleep apnea     on CPAP (not using 2016)     TIA (transient ischaemic attack)      Ventral hernia, unspecified, without mention of obstruction or gangrene        PAST SURGICAL HISTORY:  Past Surgical History:   Procedure Laterality Date     APPENDECTOMY       BIOPSY BREAST      x2 -needle & lumpectomy-benign     CHOLECYSTECTOMY       CORONARY ANGIOGRAPHY ADULT ORDER  9/28/2007    Bare metal stent to OM1, Diagonal patent      CORONARY ANGIOGRAPHY ADULT ORDER  9/25/2007    [place] stent to Diagonal     HC LEFT HEART CATHETERIZATION   2013    Moderate CAD     HYSTERECTOMY TOTAL ABDOMINAL       ORTHOPEDIC SURGERY      knee replacement on right side (), Left side ()     RELEASE CARPAL TUNNEL      right and left     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       FAMILY HISTORY:  Family History   Problem Relation Age of Onset     Neurologic Disorder Mother      MS - at 60's     C.A.D. Father       at 8o's, ? prostate ca     Breast Cancer No family hx of      Cancer - colorectal No family hx of        SOCIAL HISTORY:  Social History     Social History     Marital status:      Spouse name: N/A     Number of children: N/A     Years of education: N/A     Social History Main Topics     Smoking status: Never Smoker     Smokeless tobacco: Never Used     Alcohol use 0.0 - 0.6 oz/week     0 - 1 Standard drinks or equivalent per week      Comment: rarely     Drug use: No     Sexual activity: Not Currently     Partners: Male     Other Topics Concern     Caffeine Concern No     Sleep Concern No     Stress Concern No     Weight Concern No     Special Diet No     Exercise Yes     walking, swimming x2 a week     Seat Belt Yes     Social History Narrative    , 1 step son    Work- clown for profession        Tobacco- none,smoked for couple months in s    ETOH- occ 1/2 months    Diet coke- 2-3 cans/day    Exercise- swims 8 laps -3/week               Review of Systems:  Skin:  Negative       Eyes:  Positive for glasses    ENT:  Positive for nasal congestion    Respiratory:  Positive for shortness of breath;dyspnea on exertion;cough;wheezing asthma   Cardiovascular:    Positive for;fatigue when rolling over on L side, Vertigo  Gastroenterology: Negative      Genitourinary:  not assessed      Musculoskeletal:  Positive for joint pain    Neurologic:  Negative      Psychiatric:  Negative      Heme/Lymph/Imm:  Negative      Endocrine:  Positive for diabetes      Physical Exam:  Vitals: /80  Pulse 76  Ht 1.702 m (5'  "7\")  Wt 126.6 kg (279 lb)  BMI 43.7 kg/m2    Constitutional:  cooperative, alert and oriented, well developed, well nourished, in no acute distress        Skin:  warm and dry to the touch, no apparent skin lesions or masses noted        Head:  normocephalic, no masses or lesions        Eyes:  pupils equal and round;conjunctivae and lids unremarkable;sclera white        ENT:  no pallor or cyanosis, dentition good        Neck:  carotid pulses are full and equal bilaterally, JVP normal, no carotid bruit, no thyromegaly        Chest:  clear to auscultation;normal symmetry          Cardiac: regular rhythm;normal S1 and S2       grade 1;early systolic murmur          Abdomen:  abdomen soft obese difficult exam    Vascular:                                     DP pulses 2+--right radial artery site clean post angiogram    Extremities and Back:  no deformities, clubbing, cyanosis, erythema observed   bilateral LE edema;trace     hx lymphedema    Neurological:  no gross motor deficits;affect appropriate        Thank you for allowing me to participate in the care of your patient.    Sincerely,     YOHANNES [facility]    "

## 2017-08-03 DIAGNOSIS — F33.1 MAJOR DEPRESSIVE DISORDER, RECURRENT EPISODE, MODERATE (H): ICD-10-CM

## 2017-08-03 RX ORDER — DULOXETIN HYDROCHLORIDE 30 MG/1
CAPSULE, DELAYED RELEASE ORAL
Qty: 90 CAPSULE | Refills: 0 | Status: SHIPPED | OUTPATIENT
Start: 2017-08-03 | End: 2017-08-15

## 2017-08-03 NOTE — TELEPHONE ENCOUNTER
DULoxetine (CYMBALTA) 30 MG EC capsule      Last Written Prescription Date: 9/16/2016  Last Fill Quantity: 90, # refills: 1  Last Office Visit with [facility], [facility] or [facility] prescribing provider: 4/11/2017   Next 5 appointments (look out 90 days)     Aug 15, 2017  9:00 AM CDT   [facility]    [address] [id number] [phone]                   BP Readings from Last 3 Encounters:   08/01/17 129/80   07/19/17 109/73   07/07/17 130/68     Pulse: (for Fetzima)  Creatinine   Date Value Ref Range Status   08/01/2017 1.24 0.70 - 1.30 mg/dL Final   ]    Last PHQ-9 score on record=   PHQ-9 SCORE 4/11/2017   Total Score -   Total Score 8

## 2017-08-03 NOTE — TELEPHONE ENCOUNTER
Prescription approved per [facility].  Michelle Garcia RN

## 2017-08-15 ENCOUNTER — OFFICE VISIT (OUTPATIENT)
Dept: FAMILY MEDICINE | Facility: CLINIC | Age: 82
End: 2017-08-15
Payer: COMMERCIAL

## 2017-08-15 VITALS
BODY MASS INDEX: 44.1 KG/M2 | DIASTOLIC BLOOD PRESSURE: 70 MMHG | OXYGEN SATURATION: 94 % | HEART RATE: 83 BPM | WEIGHT: 281 LBS | RESPIRATION RATE: 20 BRPM | HEIGHT: 67 IN | TEMPERATURE: 97.4 F | SYSTOLIC BLOOD PRESSURE: 124 MMHG

## 2017-08-15 DIAGNOSIS — F33.1 MAJOR DEPRESSIVE DISORDER, RECURRENT EPISODE, MODERATE (H): ICD-10-CM

## 2017-08-15 DIAGNOSIS — K43.9 VENTRAL HERNIA WITHOUT OBSTRUCTION OR GANGRENE: ICD-10-CM

## 2017-08-15 DIAGNOSIS — E66.01 MORBID OBESITY DUE TO EXCESS CALORIES (H): ICD-10-CM

## 2017-08-15 DIAGNOSIS — M25.511 ACUTE PAIN OF RIGHT SHOULDER: ICD-10-CM

## 2017-08-15 DIAGNOSIS — E11.21 TYPE 2 DIABETES MELLITUS WITH DIABETIC NEPHROPATHY, WITHOUT LONG-TERM CURRENT USE OF INSULIN (H): Primary | ICD-10-CM

## 2017-08-15 DIAGNOSIS — J45.20 INTERMITTENT ASTHMA, UNCOMPLICATED: ICD-10-CM

## 2017-08-15 LAB
ANION GAP SERPL CALCULATED.3IONS-SCNC: 10 MMOL/L (ref 3–14)
BUN SERPL-MCNC: 20 MG/DL (ref 7–30)
CALCIUM SERPL-MCNC: 9.2 MG/DL (ref 8.5–10.1)
CHLORIDE SERPL-SCNC: 106 MMOL/L (ref 94–109)
CO2 SERPL-SCNC: 23 MMOL/L (ref 20–32)
CREAT SERPL-MCNC: 1.16 MG/DL (ref 0.52–1.04)
CREAT UR-MCNC: 225 MG/DL
GFR SERPL CREATININE-BSD FRML MDRD: 45 ML/MIN/1.7M2
GLUCOSE SERPL-MCNC: 200 MG/DL (ref 70–99)
HBA1C MFR BLD: 6.6 % (ref 4.3–6)
MICROALBUMIN UR-MCNC: 258 MG/L
MICROALBUMIN/CREAT UR: 114.67 MG/G CR (ref 0–25)
POTASSIUM SERPL-SCNC: 4.6 MMOL/L (ref 3.4–5.3)
SODIUM SERPL-SCNC: 139 MMOL/L (ref 133–144)

## 2017-08-15 PROCEDURE — 83036 HEMOGLOBIN GLYCOSYLATED A1C: CPT | Performed by: INTERNAL MEDICINE

## 2017-08-15 PROCEDURE — 80048 BASIC METABOLIC PNL TOTAL CA: CPT | Performed by: INTERNAL MEDICINE

## 2017-08-15 PROCEDURE — 82043 UR ALBUMIN QUANTITATIVE: CPT | Performed by: INTERNAL MEDICINE

## 2017-08-15 PROCEDURE — 36415 COLL VENOUS BLD VENIPUNCTURE: CPT | Performed by: INTERNAL MEDICINE

## 2017-08-15 PROCEDURE — 99214 OFFICE O/P EST MOD 30 MIN: CPT | Performed by: INTERNAL MEDICINE

## 2017-08-15 RX ORDER — DULOXETIN HYDROCHLORIDE 60 MG/1
60 CAPSULE, DELAYED RELEASE ORAL DAILY
Qty: 90 CAPSULE | Refills: 1 | Status: SHIPPED | OUTPATIENT
Start: 2017-08-15 | End: 2018-04-03

## 2017-08-15 RX ORDER — METFORMIN HCL 500 MG
1000 TABLET, EXTENDED RELEASE 24 HR ORAL
Qty: 180 TABLET | Refills: 1 | Status: SHIPPED | OUTPATIENT
Start: 2017-08-15 | End: 2018-07-09

## 2017-08-15 ASSESSMENT — ANXIETY QUESTIONNAIRES

## 2017-08-15 ASSESSMENT — PATIENT HEALTH QUESTIONNAIRE - PHQ9
5. POOR APPETITE OR OVEREATING: NOT AT ALL
SUM OF ALL RESPONSES TO PHQ QUESTIONS 1-9: 4

## 2017-08-15 NOTE — NURSING NOTE
"Chief Complaint   Patient presents with     Hypertension     Diabetes     Hyperlipidemia       Initial /70 (BP Location: Left arm, Patient Position: Chair, Cuff Size: Adult Large)  Pulse 83  Temp 97.4  F (36.3  C) (Oral)  Resp 20  Ht 5' 7\" (1.702 m)  Wt 281 lb (127.5 kg)  SpO2 94%  BMI 44.01 kg/m2 Estimated body mass index is 44.01 kg/(m^2) as calculated from the following:    Height as of this encounter: 5' 7\" (1.702 m).    Weight as of this encounter: 281 lb (127.5 kg).  Medication Reconciliation: complete   Cheryl Harris CMA (AAMA)      "

## 2017-08-15 NOTE — PROGRESS NOTES
SUBJECTIVE:                                                    Moira Andre is a 83 year old female who presents to clinic today for the following health issues:      Diabetes Follow-up    Patient is checking blood sugars: once daily.  Results are as follows:       am - 110's-130's    Doing well with    Diabetic concerns: None     Symptoms of hypoglycemia (low blood sugar): none     Paresthesias (numbness or burning in feet) or sores: No     Date of last diabetic eye exam: 1 year ago    Reviewed weight trends. She has been gaining weight.    States she has not been exercising as often and not following healthy diet     Currently not on metformin. She was on it in the past but unsure why it was discontinued    Per labs, her DM is under good control   Lab Results   Component Value Date    A1C 6.7 04/11/2017    A1C 6.7 12/16/2016    A1C 6.7 09/16/2016    A1C 7.0 06/28/2016    A1C 6.3 02/09/2016       Hyperlipidemia Follow-Up      Rate your low fat/cholesterol diet?: fair    Taking statin?  Yes, no muscle aches from statin    Other lipid medications/supplements?:  none    Hypertension Follow-up      Outpatient blood pressures are being checked at home.  Results are 130's/60's.    Low Salt Diet: low salt      Amount of exercise or physical activity: swimming <1 week    Problems taking medications regularly: No    Medication side effects: none    Diet: low salt and low fat/cholesterol    R shoulder pain  Complains of right shoulder pain x few weeks   Describes it as an achy pain that initially was localized to R shoulder but has progressed and now radiates up her neck and back   At the time, pain developed suddenly but then resolved on its own   A few days later she tried to push herself up from bed and it triggered the same R shoulder pain  She is able to move her shoulder as pain is not necessarily associated with movement   Denies any recent injury  Has been taking 1-2 tablets of Tylenol in the morning   Takes half  tablet of Norco daily, rarely 1 full tablet only if needed   She has hx of arthritis. Has not seen rheumatologist in a while as she states she has been doing well    Depression Followup    Status since last visit: same    See PHQ-9 for current symptoms.  Other associated symptoms: frequent bouts of crying in the morning for no apparent reason     Complicating factors:   Significant life event:  No   Current substance abuse:  None  Anxiety or Panic symptoms:  No    Problem list and histories reviewed & adjusted, as indicated.  Additional history: as documented    Patient Active Problem List   Diagnosis     CKD (chronic kidney disease) stage 3, GFR 30-59 ml/min     Morbid obesity (H)     CAD (coronary artery disease)     Type 2 diabetes mellitus with diabetic nephropathy (H)     Transient cerebral ischemia     Hyperlipidemia LDL goal <70     Advanced directives, counseling/discussion     Ventral hernia     Intermittent asthma     Moderate major depression (H)     ELIGIO on CPAP     Microalbuminuria     S/P total knee arthroplasty     OA (osteoarthritis) of knee     Anemia due to blood loss, acute     Health Care Home     Essential hypertension     Gastroesophageal reflux disease without esophagitis     Bilateral edema of lower extremity     Osteoarthritis     Rheumatoid arthritis involving both hands with negative rheumatoid factor (H)     High risk medication use     Anxiety     Other chronic pain     Controlled substance agreement signed     Past Surgical History:   Procedure Laterality Date     APPENDECTOMY       BIOPSY BREAST      x2 -needle & lumpectomy-benign     CHOLECYSTECTOMY       CORONARY ANGIOGRAPHY ADULT ORDER  9/28/2007    Bare metal stent to OM1, Diagonal patent      CORONARY ANGIOGRAPHY ADULT ORDER  9/25/2007    [place] stent to Diagonal     HC LEFT HEART CATHETERIZATION  8/2013    Moderate CAD     HYSTERECTOMY TOTAL ABDOMINAL       ORTHOPEDIC SURGERY      knee replacement on right side (2006), Left side  (2016)     RELEASE CARPAL TUNNEL      right and left     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 Father       at 8o's, ? prostate ca     Breast Cancer No family hx of      Cancer - colorectal No family hx of          Current Outpatient Prescriptions   Medication Sig Dispense Refill     DULoxetine (CYMBALTA) 30 MG EC capsule TAKE 1 CAPSULE EVERY DAY 90 capsule 0     Blood Glucose Monitoring Suppl (TRUE METRIX AIR GLUCOSE METER) W/DEVICE KIT USE AS DIRECTED 1 kit 0     albuterol (PROAIR HFA/PROVENTIL HFA/VENTOLIN HFA) 108 (90 BASE) MCG/ACT Inhaler Inhale 2 puffs into the lungs every 6 hours as needed for shortness of breath / dyspnea 1 Inhaler 0     TURMERIC PO        lisinopril (PRINIVIL/ZESTRIL) 10 MG tablet Take 1 tablet (10 mg) by mouth daily 90 tablet 1     HYDROcodone-acetaminophen (NORCO) 5-325 MG per tablet Take 1 tablet by mouth every 8 hours as needed for moderate to severe pain 90 tablet 0     nystatin-triamcinolone (MYCOLOG II) cream Apply topically 2 times daily 60 g 1     IBANdronate (BONIVA) 150 MG tablet Take 1 tablet (150 mg) by mouth every 30 days 3 tablet 3     ADVAIR DISKUS 100-50 MCG/DOSE diskus inhaler INHALE 1 PUFF EVERY 12 HOURS 60 Inhaler 3     metoprolol (TOPROL-XL) 100 MG 24 hr tablet Take 1 tablet (100 mg) by mouth daily 90 tablet 3     clopidogrel (PLAVIX) 75 MG tablet Take 1 tablet (75 mg) by mouth daily 90 tablet 3     atorvastatin (LIPITOR) 20 MG tablet Take 1 tablet (20 mg) by mouth daily 90 tablet 3     order for DME DREAMSTATION  5-15 CM/H20  NASAL WISP FABRIC       blood glucose monitoring (NO BRAND SPECIFIED) meter device kit Use to test  "blood sugar 1-2 times daily or as directed. 1 kit 0     blood glucose monitoring (NO BRAND SPECIFIED) test strip Use to test blood sugars 1-2 times daily or as directed 100 each 3     TRUEPLUS LANCETS 28G MISC 1 each 2 times daily as needed 100 each 3     acetaminophen (TYLENOL) 325 MG tablet Take 325-650 mg by mouth every 6 hours as needed for mild pain       Glucosamine HCl 750 MG TABS Take 1 tablet by mouth 2 times daily       Cholecalciferol (VITAMIN D) 2000 UNITS CAPS Take by mouth daily       calcium-vitamin D (CALTRATE) 600-400 MG-UNIT per tablet Take 1 tablet by mouth daily        fluticasone (FLONASE) 50 MCG/ACT nasal spray Spray 1 spray into both nostrils as needed        Multiple Vitamins-Minerals (HAIR/SKIN/NAILS) TABS Take 1 tablet by mouth daily.        FEXOFENADINE HCL PO Take 180 mg by mouth daily.       Nitroglycerin (NITROQUICK SL) Place 0.4 mg under the tongue every 5 minutes as needed        Allergies   Allergen Reactions     Aspirin Hives     Reaction occurred during childhood.      Minocycline      Yellow Dye Allergy. Minocycline has Yellow Dye #10.     Yellow Dye Hives     Rxn to yellow tablet. Eyes swelled shut.          ROS:  Constitutional, neuro, ENT, endocrine, pulmonary, cardiac, gastrointestinal, genitourinary, musculoskeletal, integument and psychiatric systems are negative, except as otherwise noted.    This document serves as a record of the services and decisions personally performed and made by Maryan Churchill MD. It was created on her behalf by Hiwot Louis, a trained medical scribe. The creation of this document is based the provider's statements to the medical scribe.    Scribdayan Louis 9:10 AM, August 15, 2017    OBJECTIVE:                                                    /70 (BP Location: Left arm, Patient Position: Chair, Cuff Size: Adult Large)  Pulse 83  Temp 97.4  F (36.3  C) (Oral)  Resp 20  Ht 5' 7\" (1.702 m)  Wt 281 lb (127.5 kg)  SpO2 94%  BMI " 44.01 kg/m2  Body mass index is 44.01 kg/(m^2).  GENERAL APPEARANCE: ambulates with help of a walker, alert, no distress and over weight  ABDOMEN: obese, protuberant and soft, non-tender  MS: R shoulder-hard to assess ROM  PSYCH: mentation appears normal and affect normal       ASSESSMENT/PLAN:                                                    Moira was seen today for hypertension, diabetes and hyperlipidemia.    Diagnoses and all orders for this visit:    Type 2 diabetes mellitus with diabetic nephropathy, without long-term current use of insulin (H)  -     Albumin Random Urine Quantitative  -     metFORMIN (GLUCOPHAGE-XR) 500 MG 24 hr tablet; Take 2 tablets (1,000 mg) by mouth daily (with dinner)  -     Hemoglobin A1c  -     Basic metabolic panel  We discussed Trulicity but cost is high for the patient and she believes her insurance will not cover it so Metformin was prescribed  This should help her achieve better control of DM and hopefully helps with weight loss  Her creatinine was slightly high in the past, but most recent one was acceptable   Will continue to monitor closely    Morbid obesity due to excess calories (H)  Discussed importance of healthy diet and exercise  Hopefully metformin helps control appetite     Major depressive disorder, recurrent episode, moderate (H)  -     DULoxetine (CYMBALTA) 60 MG EC capsule; Take 1 capsule (60 mg) by mouth daily  Continues with frequent crying spells in the morning   Dose of Duloxetine was increased to 2 tablet of 30 mg Cymbalta until she finishes finish current supply. She will then go to 60 mg one tab daily.  Hopefully this also helps with her pain    Acute pain of right shoulder  -     diclofenac (VOLTAREN) 1 % GEL topical gel; Apply 4 grams to knees or 2 grams to hands four times daily using enclosed dosing card.  Diclofenac was prescribed as patient cannot take OTC analgesics due to kidney function   Discussed ortho referral and possible intraarticular  injection  She declined for now but will think about it if symptoms persist     Intermittent asthma, uncomplicated  Controlled     Ventral hernia without obstruction or gangrene  Not causing discomfort  She will think about surgical repair in the future  Not interested at this point.     Follow up in 3 months   Seek sooner medical attention if there is any new or worsening symptoms      The information in this document, created by the medical scribe for me, accurately reflects the services I personally performed and the decisions made by me. I have reviewed and approved this document for accuracy prior to leaving the patient care area.  Maryan Churchill MD  9:24 AM, 08/15/17    Maryan Churchill MD  [facility]

## 2017-08-15 NOTE — LETTER
[facility]  [address] [facility]  [address]  [place]  [address]  Tel: [phone]    August 16, 2017    Moira ALEGRIA Jes  [address]  [facility] [address]        Dear MsNila Andre,    Urine is positive for protein and numbers are slightly worse.  Glucose which is your blood sugar is  elevated.  HbA1c which is average glucose during last 3 months is 6.6%.  Eat low cholesterol low fat  diet and do regular physical activity. Avoid high sugar containing food.    If you have any further questions or problems, please contact our office.      Sincerely,    Maryan Churchill MD/enrico    Results for orders placed or performed in visit on 08/15/17   Albumin Random Urine Quantitative   Result Value Ref Range    Creatinine Urine 225 mg/dL    Albumin Urine mg/L 258 mg/L    Albumin Urine mg/g Cr 114.67 (H) 0 - 25 mg/g Cr   Hemoglobin A1c   Result Value Ref Range    Hemoglobin A1C 6.6 (H) 4.3 - 6.0 %   Basic metabolic panel   Result Value Ref Range    Sodium 139 133 - 144 mmol/L    Potassium 4.6 3.4 - 5.3 mmol/L    Chloride 106 94 - 109 mmol/L    Carbon Dioxide 23 20 - 32 mmol/L    Anion Gap 10 3 - 14 mmol/L    Glucose 200 (H) 70 - 99 mg/dL    Urea Nitrogen 20 7 - 30 mg/dL    Creatinine 1.16 (H) 0.52 - 1.04 mg/dL    GFR Estimate 45 (L) >60 mL/min/1.7m2    GFR Estimate If Black 54 (L) >60 mL/min/1.7m2    Calcium 9.2 8.5 - 10.1 mg/dL           Enclosure: Lab Results

## 2017-08-15 NOTE — PATIENT INSTRUCTIONS
Start taking metformin as prescribed   The dose of duloxetine is increased to 60 mg daily  You can take # 2 tablet of 30 mg Cymbalta and once you finish current supply then go to Cymbalta( duloxetine) 60 mg one tab daily   Norco  can be habit-forming. It should be taken as prescribed. Do not mix it  with alcohol. Be careful with driving.Do not loose the  Prescription.  Do not overuse this medication. It is a controlled substance.  Labs today  Follow up in 3 months   Seek sooner medical attention if there is any new or worsening symptoms

## 2017-08-16 ASSESSMENT — ASTHMA QUESTIONNAIRES: ACT_TOTALSCORE: 21

## 2017-08-16 ASSESSMENT — ANXIETY QUESTIONNAIRES: GAD7 TOTAL SCORE: 0

## 2017-08-16 NOTE — PROGRESS NOTES
Please Notify the patient  Urine is positive for protein and numbers are slightly worse.  Glucose which is your blood sugar is  elevated.  HbA1c which is average glucose during last 3 months is 6.6%.  Eat low cholesterol low fat  diet and do regular physical activity. Avoid high sugar containing food.  Please send the copy of lab results also to this patient.

## 2017-08-18 DIAGNOSIS — E11.21 TYPE 2 DIABETES MELLITUS WITH DIABETIC NEPHROPATHY, WITHOUT LONG-TERM CURRENT USE OF INSULIN (H): Primary | ICD-10-CM

## 2017-08-22 ENCOUNTER — TELEPHONE (OUTPATIENT)
Dept: FAMILY MEDICINE | Facility: CLINIC | Age: 82
End: 2017-08-22

## 2017-08-22 NOTE — TELEPHONE ENCOUNTER
Reason for Call:  Medication or medication refill:    Do you use a [facility]?  Name of the pharmacy and phone number for the current request:       [facility]    Name of the medication requested: metFORMIN (GLUCOPHAGE-XR) 500 MG 24 hr tablet, diclofenac (VOLTAREN) 1 % GEL topical gel and  DULoxetine (CYMBALTA) 60 MG EC capsule    Other request: pt. Called stating that she had spoken with pharmacy and that the pharmacy has sent faxes to the clinic and have not heard back  From clinic.  Please resend the prescriptions.    Can we leave a detailed message on this number? YES    Phone number patient can be reached at: Home number on file [phone] (home)    Best Time: any time    Call taken on 8/22/2017 at 4:30 PM by Farrah Villasenor    .

## 2017-08-23 NOTE — TELEPHONE ENCOUNTER
Spoke with [facility] pharmacist who states they shipped patient medication on 8-.  Patient notified.    Sammie Mascorro MA

## 2017-10-09 ENCOUNTER — TELEPHONE (OUTPATIENT)
Dept: CARDIOLOGY | Facility: CLINIC | Age: 82
End: 2017-10-09

## 2017-10-09 NOTE — TELEPHONE ENCOUNTER
Pt calling in for Plavix hold needs 7 days for spinal injection. Pt will hold 7 days and restart right after if procedure DR has no complications. This is same hold as last November 2016. DEB Thomas RN

## 2017-11-08 DIAGNOSIS — J45.20 INTERMITTENT ASTHMA, UNCOMPLICATED: ICD-10-CM

## 2017-11-09 NOTE — TELEPHONE ENCOUNTER
Prescription approved per [facility] [place].  Maria R MONCADA RN    Requested Prescriptions   Pending Prescriptions Disp Refills     ADVAIR DISKUS 100-50 MCG/DOSE diskus inhaler [Pharmacy Med Name: ADVAIR DISKUS 100-50 MCG/DOSE Aerosol Powder Breath Activated]  3     Sig: INHALE 1 PUFF EVERY 12 HOURS    Inhaled Steroids Protocol Passed    11/8/2017  6:55 PM       Passed - Patient is age 12 or older       Passed - Asthma control test 20 or greater in last 6 months    Please review ACT score.          Passed - Recent (6 mo) or future visit with authorizing provider's specialty    Patient had office visit in the last 6 months or has a visit in the next 30 days with authorizing provider.  See chart review.

## 2017-11-27 DIAGNOSIS — I25.10 CORONARY ARTERY DISEASE INVOLVING NATIVE CORONARY ARTERY OF NATIVE HEART WITHOUT ANGINA PECTORIS: ICD-10-CM

## 2017-11-29 RX ORDER — CLOPIDOGREL BISULFATE 75 MG/1
TABLET ORAL
Qty: 90 TABLET | Refills: 0 | Status: SHIPPED | OUTPATIENT
Start: 2017-11-29 | End: 2018-02-16

## 2017-11-29 NOTE — TELEPHONE ENCOUNTER
Prescription approved per [facility]ill Protocol.  Due for f/u appt this month  Maria R MONCADA RN    Requested Prescriptions   Pending Prescriptions Disp Refills     clopidogrel (PLAVIX) 75 MG tablet [Pharmacy Med Name: CLOPIDOGREL 75 MG Tablet] 90 tablet 3     Sig: TAKE 1 TABLET EVERY DAY    Plavix Passed    11/28/2017  8:25 AM       Passed - No active PPI on record unless is Protonix       Passed - Normal HGB on file in past 12 months    Recent Labs   Lab Test  07/19/17   0655   HGB  12.7              Passed - Normal Platelets on file in past 12 months    Recent Labs   Lab Test  07/19/17   0655   PLT  273              Passed - Recent or future visit with authorizing provider's specialty    Patient had office visit in the last year or has a visit in the next 30 days with authorizing provider.  See chart review.              Passed - Patient is age 18 or older       Passed - No active pregnancy on record       Passed - No positive pregnancy test in past 12 months

## 2018-02-19 ENCOUNTER — TELEPHONE (OUTPATIENT)
Dept: FAMILY MEDICINE | Facility: CLINIC | Age: 83
End: 2018-02-19

## 2018-02-19 DIAGNOSIS — J45.20 INTERMITTENT ASTHMA: ICD-10-CM

## 2018-02-19 RX ORDER — ALBUTEROL SULFATE 90 UG/1
2 AEROSOL, METERED RESPIRATORY (INHALATION) EVERY 6 HOURS PRN
Qty: 1 INHALER | Refills: 0 | Status: SHIPPED | OUTPATIENT
Start: 2018-02-19 | End: 2018-04-03

## 2018-02-19 NOTE — TELEPHONE ENCOUNTER
Pt is requesting rx Ventolin.  She only has a little bit left in her last inhaler.  Send to [facility] [address].  Pt may be reached at [phone].

## 2018-02-19 NOTE — TELEPHONE ENCOUNTER
Patient due for 6 months diabetes follow up with Dr. Churchill.  Placed note on prescription for Ventolin asking to let patient know to call clinic to schedule.   1 inhaler approved to pharmacy.     Tamra Mccullough RN

## 2018-04-03 ENCOUNTER — OFFICE VISIT (OUTPATIENT)
Dept: FAMILY MEDICINE | Facility: CLINIC | Age: 83
End: 2018-04-03
Payer: COMMERCIAL

## 2018-04-03 VITALS
BODY MASS INDEX: 45.11 KG/M2 | SYSTOLIC BLOOD PRESSURE: 139 MMHG | DIASTOLIC BLOOD PRESSURE: 80 MMHG | OXYGEN SATURATION: 92 % | WEIGHT: 288 LBS | HEART RATE: 83 BPM | TEMPERATURE: 97.3 F

## 2018-04-03 DIAGNOSIS — I10 ESSENTIAL HYPERTENSION: ICD-10-CM

## 2018-04-03 DIAGNOSIS — J45.20 INTERMITTENT ASTHMA, UNCOMPLICATED: ICD-10-CM

## 2018-04-03 DIAGNOSIS — M06.042 RHEUMATOID ARTHRITIS INVOLVING BOTH HANDS WITH NEGATIVE RHEUMATOID FACTOR (H): ICD-10-CM

## 2018-04-03 DIAGNOSIS — M06.041 RHEUMATOID ARTHRITIS INVOLVING BOTH HANDS WITH NEGATIVE RHEUMATOID FACTOR (H): ICD-10-CM

## 2018-04-03 DIAGNOSIS — M25.50 MULTIPLE JOINT PAIN: ICD-10-CM

## 2018-04-03 DIAGNOSIS — Z71.89 COUNSELING REGARDING ADVANCED DIRECTIVES: ICD-10-CM

## 2018-04-03 DIAGNOSIS — I25.10 CORONARY ARTERY DISEASE INVOLVING NATIVE CORONARY ARTERY OF NATIVE HEART WITHOUT ANGINA PECTORIS: ICD-10-CM

## 2018-04-03 DIAGNOSIS — E78.5 HYPERLIPIDEMIA LDL GOAL <70: ICD-10-CM

## 2018-04-03 DIAGNOSIS — E11.21 TYPE 2 DIABETES MELLITUS WITH DIABETIC NEPHROPATHY, WITHOUT LONG-TERM CURRENT USE OF INSULIN (H): Primary | ICD-10-CM

## 2018-04-03 DIAGNOSIS — H92.02 LEFT EAR PAIN: ICD-10-CM

## 2018-04-03 DIAGNOSIS — Z79.899 MEDICATION MANAGEMENT: ICD-10-CM

## 2018-04-03 DIAGNOSIS — F32.1 MODERATE MAJOR DEPRESSION (H): ICD-10-CM

## 2018-04-03 DIAGNOSIS — G89.29 OTHER CHRONIC PAIN: ICD-10-CM

## 2018-04-03 DIAGNOSIS — E66.01 MORBID OBESITY (H): ICD-10-CM

## 2018-04-03 LAB
ERYTHROCYTE [DISTWIDTH] IN BLOOD BY AUTOMATED COUNT: 14.7 % (ref 10–15)
HBA1C MFR BLD: 7.2 % (ref 0–6.4)
HCT VFR BLD AUTO: 43.4 % (ref 35–47)
HGB BLD-MCNC: 13.4 G/DL (ref 11.7–15.7)
MCH RBC QN AUTO: 26.5 PG (ref 26.5–33)
MCHC RBC AUTO-ENTMCNC: 30.9 G/DL (ref 31.5–36.5)
MCV RBC AUTO: 86 FL (ref 78–100)
PLATELET # BLD AUTO: 307 10E9/L (ref 150–450)
RBC # BLD AUTO: 5.06 10E12/L (ref 3.8–5.2)
WBC # BLD AUTO: 7.4 10E9/L (ref 4–11)

## 2018-04-03 PROCEDURE — 80306 DRUG TEST PRSMV INSTRMNT: CPT | Performed by: INTERNAL MEDICINE

## 2018-04-03 PROCEDURE — 83036 HEMOGLOBIN GLYCOSYLATED A1C: CPT | Performed by: INTERNAL MEDICINE

## 2018-04-03 PROCEDURE — 80061 LIPID PANEL: CPT | Performed by: INTERNAL MEDICINE

## 2018-04-03 PROCEDURE — 99214 OFFICE O/P EST MOD 30 MIN: CPT | Performed by: INTERNAL MEDICINE

## 2018-04-03 PROCEDURE — 36415 COLL VENOUS BLD VENIPUNCTURE: CPT | Performed by: INTERNAL MEDICINE

## 2018-04-03 PROCEDURE — 85027 COMPLETE CBC AUTOMATED: CPT | Performed by: INTERNAL MEDICINE

## 2018-04-03 PROCEDURE — 80053 COMPREHEN METABOLIC PANEL: CPT | Performed by: INTERNAL MEDICINE

## 2018-04-03 PROCEDURE — 84443 ASSAY THYROID STIM HORMONE: CPT | Performed by: INTERNAL MEDICINE

## 2018-04-03 RX ORDER — ATORVASTATIN CALCIUM 20 MG/1
20 TABLET, FILM COATED ORAL DAILY
Qty: 90 TABLET | Refills: 3 | Status: SHIPPED | OUTPATIENT
Start: 2018-04-03 | End: 2018-06-11

## 2018-04-03 RX ORDER — METOPROLOL SUCCINATE 100 MG/1
100 TABLET, EXTENDED RELEASE ORAL DAILY
Qty: 90 TABLET | Refills: 3 | Status: SHIPPED | OUTPATIENT
Start: 2018-04-03 | End: 2018-05-21

## 2018-04-03 RX ORDER — HYDROCODONE BITARTRATE AND ACETAMINOPHEN 5; 325 MG/1; MG/1
1 TABLET ORAL EVERY 8 HOURS PRN
Qty: 90 TABLET | Refills: 0 | Status: SHIPPED | OUTPATIENT
Start: 2018-04-03 | End: 2019-04-01

## 2018-04-03 RX ORDER — ALBUTEROL SULFATE 90 UG/1
2 AEROSOL, METERED RESPIRATORY (INHALATION) EVERY 6 HOURS PRN
Qty: 1 INHALER | Refills: 3 | Status: SHIPPED | OUTPATIENT
Start: 2018-04-03 | End: 2019-08-15

## 2018-04-03 RX ORDER — LISINOPRIL 10 MG/1
10 TABLET ORAL DAILY
Qty: 90 TABLET | Refills: 1 | Status: SHIPPED | OUTPATIENT
Start: 2018-04-03 | End: 2018-05-02

## 2018-04-03 RX ORDER — DULOXETIN HYDROCHLORIDE 60 MG/1
60 CAPSULE, DELAYED RELEASE ORAL DAILY
Qty: 90 CAPSULE | Refills: 1 | Status: SHIPPED | OUTPATIENT
Start: 2018-04-03 | End: 2018-05-09

## 2018-04-03 RX ORDER — CLOPIDOGREL BISULFATE 75 MG/1
75 TABLET ORAL DAILY
Qty: 90 TABLET | Refills: 3 | Status: SHIPPED | OUTPATIENT
Start: 2018-04-03 | End: 2018-10-23

## 2018-04-03 RX ORDER — NEOMYCIN SULFATE, POLYMYXIN B SULFATE, HYDROCORTISONE 3.5; 10000; 1 MG/ML; [USP'U]/ML; MG/ML
4 SOLUTION/ DROPS AURICULAR (OTIC) 4 TIMES DAILY
Qty: 10 ML | Refills: 0 | Status: SHIPPED | OUTPATIENT
Start: 2018-04-03 | End: 2021-02-17

## 2018-04-03 RX ORDER — NEOMYCIN SULFATE, POLYMYXIN B SULFATE, HYDROCORTISONE 3.5; 10000; 1 MG/ML; [USP'U]/ML; MG/ML
4 SOLUTION/ DROPS AURICULAR (OTIC) 4 TIMES DAILY
COMMUNITY
End: 2018-04-03

## 2018-04-03 RX ORDER — NEOMYCIN SULFATE, POLYMYXIN B SULFATE, HYDROCORTISONE 3.5; 10000; 1 MG/ML; [USP'U]/ML; MG/ML
4 SOLUTION/ DROPS AURICULAR (OTIC) 4 TIMES DAILY
Qty: 10 ML | Refills: 0 | Status: CANCELLED | OUTPATIENT
Start: 2018-04-03

## 2018-04-03 RX ORDER — AMOXICILLIN AND CLAVULANATE POTASSIUM 500; 125 MG/1; MG/1
1 TABLET, FILM COATED ORAL 2 TIMES DAILY
Qty: 14 TABLET | Refills: 0 | Status: SHIPPED | OUTPATIENT
Start: 2018-04-03 | End: 2018-04-04 | Stop reason: SINTOL

## 2018-04-03 NOTE — LETTER
My Depression Action Plan  Name: Moira Andre   Date of Birth 9/24/1933  Date: 4/3/2018    My doctor: Maryan Churchill   My clinic: [place] [address] [address] [facility] [address] [phone]          GREEN    ZONE   Good Control    What it looks like:     Things are going generally well. You have normal up s and down s. You may even feel depressed from time to time, but bad moods usually last less than a day.   What you need to do:  1. Continue to care for yourself (see self care plan)  2. Check your depression survival kit and update it as needed  3. Follow your physician s recommendations including any medication.  4. Do not stop taking medication unless you consult with your physician first.           YELLOW         ZONE Getting Worse    What it looks like:     Depression is starting to interfere with your life.     It may be hard to get out of bed; you may be starting to isolate yourself from others.    Symptoms of depression are starting to last most all day and this has happened for several days.     You may have suicidal thoughts but they are not constant.   What you need to do:     1. Call your care team, your response to treatment will improve if you keep your care team informed of your progress. Yellow periods are signs an adjustment may need to be made.     2. Continue your self-care, even if you have to fake it!    3. Talk to someone in your support network    4. Open up your depression survival kit           RED    ZONE Medical Alert - Get Help    What it looks like:     Depression is seriously interfering with your life.     You may experience these or other symptoms: You can t get out of bed most days, can t work or engage in other necessary activities, you have trouble taking care of basic hygiene, or basic responsibilities, thoughts of suicide or death that will not go away, self-injurious behavior.     What you need to do:  1. Call your care team and request a  same-day appointment. If they are not available (weekends or after hours) call your local crisis line, emergency room or 911.            Depression Self Care Plan / Survival Kit    Self-Care for Depression  Here s the deal. Your body and mind are really not as separate as most people think.  What you do and think affects how you feel and how you feel influences what you do and think. This means if you do things that people who feel good do, it will help you feel better.  Sometimes this is all it takes.  There is also a place for medication and therapy depending on how severe your depression is, so be sure to consult with your medical provider and/ or Behavioral Health Consultant if your symptoms are worsening or not improving.     In order to better manage my stress, I will:    Exercise  Get some form of exercise, every day. This will help reduce pain and release endorphins, the  feel good  chemicals in your brain. This is almost as good as taking antidepressants!  This is not the same as joining a gym and then never going! (they count on that by the way ) It can be as simple as just going for a walk or doing some gardening, anything that will get you moving.      Hygiene   Maintain good hygiene (Get out of bed in the morning, Make your bed, Brush your teeth, Take a shower, and Get dressed like you were going to work, even if you are unemployed).  If your clothes don't fit try to get ones that do.    Diet  I will strive to eat foods that are good for me, drink plenty of water, and avoid excessive sugar, caffeine, alcohol, and other mood-altering substances.  Some foods that are helpful in depression are: complex carbohydrates, B vitamins, flaxseed, fish or fish oil, fresh fruits and vegetables.    Psychotherapy  I agree to participate in Individual Therapy (if recommended).    Medication  If prescribed medications, I agree to take them.  Missing doses can result in serious side effects.  I understand that drinking  alcohol, or other illicit drug use, may cause potential side effects.  I will not stop my medication abruptly without first discussing it with my provider.    Staying Connected With Others  I will stay in touch with my friends, family members, and my primary care provider/team.    Use your imagination  Be creative.  We all have a creative side; it doesn t matter if it s oil painting, sand castles, or mud pies! This will also kick up the endorphins.    Witness Beauty  (AKA stop and smell the roses) Take a look outside, even in mid-winter. Notice colors, textures. Watch the squirrels and birds.     Service to others  Be of service to others.  There is always someone else in need.  By helping others we can  get out of ourselves  and remember the really important things.  This also provides opportunities for practicing all the other parts of the program.    Humor  Laugh and be silly!  Adjust your TV habits for less news and crime-drama and more comedy.    Control your stress  Try breathing deep, massage therapy, biofeedback, and meditation. Find time to relax each day.     My support system    Clinic Contact:  Phone number:    Contact 1:  Phone number:    Contact 2:  Phone number:    [facility]/:  Phone number:    Therapist:  Phone number:    Local crisis center:    Phone number:    Other community support:  Phone number:

## 2018-04-03 NOTE — NURSING NOTE
"Chief Complaint   Patient presents with     Otalgia       Initial /84 (BP Location: Right arm, Cuff Size: Adult Regular)  Pulse 83  Temp 97.3  F (36.3  C) (Tympanic)  Wt 288 lb (130.6 kg)  SpO2 92%  BMI 45.11 kg/m2 Estimated body mass index is 45.11 kg/(m^2) as calculated from the following:    Height as of 8/15/17: 5' 7\" (1.702 m).    Weight as of this encounter: 288 lb (130.6 kg).  Medication Reconciliation: complete     Erin Momin MA    "

## 2018-04-03 NOTE — LETTER
My Asthma Action Plan  Name: Moira Andre   YOB: 1933  Date: 4/3/2018   My doctor: Maryan Churchill MD   My clinic: [facility]        My Control Medicine: advair  My Rescue Medicine: albuterol   My Asthma Severity: intermittent  Avoid your asthma triggers: upper respiratory infections               GREEN ZONE   Good Control    I feel good    No cough or wheeze    Can work, sleep and play without asthma symptoms       Take your asthma control medicine every day.     1. If exercise triggers your asthma, take your rescue medication    15 minutes before exercise or sports, and    During exercise if you have asthma symptoms  2. Spacer to use with inhaler: If you have a spacer, make sure to use it with your inhaler             YELLOW ZONE Getting Worse  I have ANY of these:    I do not feel good    Cough or wheeze    Chest feels tight    Wake up at night   1. Keep taking your Green Zone medications  2. Start taking your rescue medicine:    every 20 minutes for up to 1 hour. Then every 4 hours for 24-48 hours.  3. If you stay in the Yellow Zone for more than 12-24 hours, contact your doctor.  4. If you do not return to the Green Zone in 12-24 hours or you get worse, start taking your oral steroid medicine if prescribed by your provider.           RED ZONE Medical Alert - Get Help  I have ANY of these:    I feel awful    Medicine is not helping    Breathing getting harder    Trouble walking or talking    Nose opens wide to breathe       1. Take your rescue medicine NOW  2. If your provider has prescribed an oral steroid medicine, start taking it NOW  3. Call your doctor NOW  4. If you are still in the Red Zone after 20 minutes and you have not reached your doctor:    Take your rescue medicine again and    Call 911 or go to the emergency room right away    See your regular doctor within 2 weeks of an Emergency Room or Urgent Care visit for follow-up treatment.          Annual Reminders:  Meet  with Asthma Educator,  Flu Shot in the Fall, consider Pneumonia Vaccination for patients with asthma (aged 19 and older).    Pharmacy:    [facility] [[email or web address]] [id number] [address]  [facility]  [facility] - [facility] [address]  WRITTEN PRESCRIPTION REQUESTED                      Asthma Triggers  How To Control Things That Make Your Asthma Worse    Triggers are things that make your asthma worse.  Look at the list below to help you find your triggers and what you can do about them.  You can help prevent asthma flare-ups by staying away from your triggers.      Trigger                                                          What you can do   Cigarette Smoke  Tobacco smoke can make asthma worse. Do not allow smoking in your home, car or around you.  Be sure no one smokes at a child s day care or school.  If you smoke, ask your health care provider for ways to help you quit.  Ask family members to quit too.  Ask your health care provider for a referral to Quit Plan to help you quit smoking, or call 8-484-326-PLAN.     Colds, Flu, Bronchitis  These are common triggers of asthma. Wash your hands often.  Don t touch your eyes, nose or mouth.  Get a flu shot every year.     Dust Mites  These are tiny bugs that live in cloth or carpet. They are too small to see. Wash sheets and blankets in hot water every week.   Encase pillows and mattress in dust mite proof covers.  Avoid having carpet if you can. If you have carpet, vacuum weekly.   Use a dust mask and HEPA vacuum.   Pollen and Outdoor Mold  Some people are allergic to trees, grass, or weed pollen, or molds. Try to keep your windows closed.  Limit time out doors when pollen count is high.   Ask you health care provider about taking medicine during allergy season.     Animal Dander  Some people are allergic to skin flakes, urine or saliva from pets with fur or feathers. Keep pets with fur or  feathers out of your home.    If you can t keep the pet outdoors, then keep the pet out of your bedroom.  Keep the bedroom door closed.  Keep pets off cloth furniture and away from stuffed toys.     Mice, Rats, and Cockroaches  Some people are allergic to the waste from these pests.   Cover food and garbage.  Clean up spills and food crumbs.  Store grease in the refrigerator.   Keep food out of the bedroom.   Indoor Mold  This can be a trigger if your home has high moisture. Fix leaking faucets, pipes, or other sources of water.   Clean moldy surfaces.  Dehumidify basement if it is damp and smelly.   Smoke, Strong Odors, and Sprays  These can reduce air quality. Stay away from strong odors and sprays, such as perfume, powder, hair spray, paints, smoke incense, paint, cleaning products, candles and new carpet.   Exercise or Sports  Some people with asthma have this trigger. Be active!  Ask your doctor about taking medicine before sports or exercise to prevent symptoms.    Warm up for 5-10 minutes before and after sports or exercise.     Other Triggers of Asthma  Cold air:  Cover your nose and mouth with a scarf.  Sometimes laughing or crying can be a trigger.  Some medicines and food can trigger asthma.

## 2018-04-03 NOTE — PATIENT INSTRUCTIONS
Labs today  I refilled your prescriptions  Take Augmentin twice daily for 7 days  Think about Trulicity injection once a week for your diabetes  Let me know if you would like to go on it  Take metformin in the morning daily  This medication is good for your diabetes and will help control it  Norco is a controlled substance. It can be habit-forming. It should be taken as prescribed. Do not mix it with alcohol. Be careful with driving. Do not lose the prescription. Do not overuse this medication.  Tylenol is safe to use for pain  Schedule an appointment for physical at your earliest convenience  Seek sooner medical attention if there is any worsening of symptoms or problems

## 2018-04-03 NOTE — MR AVS SNAPSHOT
After Visit Summary   4/3/2018    Miora Andre    MRN: [id number]           Patient Information     Date Of Birth          9/24/1933        Visit Information        Provider Department      4/3/2018 3:30 PM Maryan Churchill MD [facility]        Today's Diagnoses     Type 2 diabetes mellitus with diabetic nephropathy, without long-term current use of insulin (H)    -  1    Moderate major depression (H)        Morbid obesity (H)        Multiple joint pain        Coronary artery disease involving native coronary artery of native heart without angina pectoris        Intermittent asthma, uncomplicated        Hyperlipidemia LDL goal <70        Essential hypertension        Left ear pain        Other chronic pain        Medication management        Counseling regarding advanced directives        Rheumatoid arthritis involving both hands with negative rheumatoid factor (H)          Care Instructions    Labs today  I refilled your prescriptions  Take Augmentin twice daily for 7 days  Think about Trulicity injection once a week for your diabetes  Let me know if you would like to go on it  Take metformin in the morning daily  This medication is good for your diabetes and will help control it  Norco is a controlled substance. It can be habit-forming. It should be taken as prescribed. Do not mix it with alcohol. Be careful with driving. Do not lose the prescription. Do not overuse this medication.  Tylenol is safe to use for pain  Schedule an appointment for physical at your earliest convenience  Seek sooner medical attention if there is any worsening of symptoms or problems          Follow-ups after your visit        Who to contact     If you have questions or need follow up information about today's clinic visit or your schedule please contact [facility] directly at [phone].  Normal or non-critical lab and imaging results will be communicated to you by MyChart, letter or phone  "within 4 business days after the clinic has received the results. If you do not hear from us within 7 days, please contact the clinic through [employer] or phone. If you have a critical or abnormal lab result, we will notify you by phone as soon as possible.  Submit refill requests through [employer] or call your pharmacy and they will forward the refill request to us. Please allow 3 business days for your refill to be completed.          Additional Information About Your Visit        [employer] Information     [employer] lets you send messages to your doctor, view your test results, renew your prescriptions, schedule appointments and more. To sign up, go to [email or web address] . Click on \"Log in\" on the left side of the screen, which will take you to the Welcome page. Then click on \"Sign up Now\" on the right side of the page.     You will be asked to enter the access code listed below, as well as some personal information. Please follow the directions to create your username and password.     Your access code is: [email or web address]  Expires: 2018  3:53 PM     Your access code will  in 90 days. If you need help or a new code, please call your [place] clinic or [phone].        Care EveryWhere ID     This is your Care EveryWhere ID. This could be used by other organizations to access your [place] medical records  [id number]        Your Vitals Were     Pulse Temperature Pulse Oximetry BMI (Body Mass Index)          83 97.3  F (36.3  C) (Tympanic) 92% 45.11 kg/m2         Blood Pressure from Last 3 Encounters:   18 139/80   08/15/17 124/70   17 129/80    Weight from Last 3 Encounters:   18 288 lb (130.6 kg)   08/15/17 281 lb (127.5 kg)   17 279 lb (126.6 kg)              We Performed the Following     Asthma Action Plan (AAP)     CBC with platelets     Comprehensive metabolic panel     DEPRESSION ACTION PLAN (DAP)     Drug Abuse Screen Panel 13, Urine (Pain Care Package)     Hemoglobin A1c  "    Lipid panel reflex to direct LDL Non-fasting     TSH with free T4 reflex          Today's Medication Changes          These changes are accurate as of 4/3/18  3:53 PM.  If you have any questions, ask your nurse or doctor.               Start taking these medicines.        Dose/Directions    amoxicillin-clavulanate 500-125 MG per tablet   Commonly known as:  AUGMENTIN   Used for:  Left ear pain   Started by:  Maryan Churchill MD        Dose:  1 tablet   Take 1 tablet by mouth 2 times daily for 7 days   Quantity:  14 tablet   Refills:  0         These medicines have changed or have updated prescriptions.        Dose/Directions    clopidogrel 75 MG tablet   Commonly known as:  PLAVIX   This may have changed:  See the new instructions.   Used for:  Coronary artery disease involving native coronary artery of native heart without angina pectoris   Changed by:  Maryan Churchill MD        Dose:  75 mg   Take 1 tablet (75 mg) by mouth daily   Quantity:  90 tablet   Refills:  3       fluticasone-salmeterol 100-50 MCG/DOSE diskus inhaler   Commonly known as:  ADVAIR DISKUS   This may have changed:  See the new instructions.   Used for:  Intermittent asthma, uncomplicated   Changed by:  Maryan Churchill MD        Dose:  1 puff   Inhale 1 puff into the lungs 2 times daily   Quantity:  3 Inhaler   Refills:  3            Where to get your medicines      These medications were sent to [id number] - [place], [place] - [address]  [address] [facility] [address]     Phone:  [phone]     albuterol 108 (90 BASE) MCG/ACT Inhaler    amoxicillin-clavulanate 500-125 MG per tablet    atorvastatin 20 MG tablet    clopidogrel 75 MG tablet    DULoxetine 60 MG EC capsule    fluticasone-salmeterol 100-50 MCG/DOSE diskus inhaler    lisinopril 10 MG tablet    metoprolol succinate 100 MG 24 hr tablet         Some of these will need a paper prescription and others can be bought over the counter.  Ask your nurse if  you have questions.     Bring a paper prescription for each of these medications     HYDROcodone-acetaminophen 5-325 MG per tablet                Primary Care Provider Office Phone # Fax #    Maryan Churchill [phone] [address] [address]        Equal Access to Services     [facility] LAUREN : Hadii aad ku hadasho Soomaali, waaxda luqadaha, qaybta kaalmada adeegyada, waxay fernandoin hayaan adege steinersoumyafariha shaw . So [facility] [phone].    ATENCIÓN: Si habla español, tiene a gold disposición servicios gratuitos de asistencia lingüística. [facility] [phone].    We comply with applicable federal civil rights laws and [place] laws. We do not discriminate on the basis of race, color, national origin, age, disability, sex, sexual orientation, or gender identity.            Thank you!     Thank you for choosing [facility]  for your care. Our goal is always to provide you with excellent care. Hearing back from our patients is one way we can continue to improve our services. Please take a few minutes to complete the written survey that you may receive in the mail after your visit with us. Thank you!             Your Updated Medication List - Protect others around you: Learn how to safely use, store and throw away your medicines at www.disposemymeds.org.          This list is accurate as of 4/3/18  3:53 PM.  Always use your most recent med list.                   Brand Name Dispense Instructions for use Diagnosis    acetaminophen 325 MG tablet    TYLENOL     Take 325-650 mg by mouth every 6 hours as needed for mild pain        albuterol 108 (90 BASE) MCG/ACT Inhaler    PROAIR HFA/PROVENTIL HFA/VENTOLIN HFA    1 Inhaler    Inhale 2 puffs into the lungs every 6 hours as needed for shortness of breath / dyspnea    Intermittent asthma, uncomplicated       amoxicillin-clavulanate 500-125 MG per tablet    AUGMENTIN    14 tablet    Take 1 tablet by mouth 2 times  daily for 7 days    Left ear pain       atorvastatin 20 MG tablet    LIPITOR    90 tablet    Take 1 tablet (20 mg) by mouth daily    Hyperlipidemia LDL goal <70       * blood glucose monitoring meter device kit    no brand specified    1 kit    Use to test blood sugar 1-2 times daily or as directed.    Type 2 diabetes mellitus with diabetic nephropathy (H)       * TRUE METRIX AIR GLUCOSE METER W/DEVICE Kit     1 kit    USE AS DIRECTED    Type 2 diabetes mellitus with diabetic nephropathy (H)       blood glucose monitoring test strip    TRUE METRIX BLOOD GLUCOSE TEST    200 strip    1 strip by In Vitro route daily    Type 2 diabetes mellitus with diabetic nephropathy, without long-term current use of insulin (H)       calcium-vitamin D 600-400 MG-UNIT per tablet    CALTRATE     Take 1 tablet by mouth daily        clopidogrel 75 MG tablet    PLAVIX    90 tablet    Take 1 tablet (75 mg) by mouth daily    Coronary artery disease involving native coronary artery of native heart without angina pectoris       diclofenac 1 % Gel topical gel    VOLTAREN    100 g    Apply 4 grams to knees or 2 grams to hands four times daily using enclosed dosing card.    Acute pain of right shoulder       DULoxetine 60 MG EC capsule    CYMBALTA    90 capsule    Take 1 capsule (60 mg) by mouth daily    Moderate major depression (H)       FEXOFENADINE HCL PO      Take 180 mg by mouth daily.        FLONASE 50 MCG/ACT spray   Generic drug:  fluticasone      Spray 1 spray into both nostrils as needed        fluticasone-salmeterol 100-50 MCG/DOSE diskus inhaler    ADVAIR DISKUS    3 Inhaler    Inhale 1 puff into the lungs 2 times daily    Intermittent asthma, uncomplicated       Glucosamine HCl 750 MG Tabs      Take 1 tablet by mouth 2 times daily        HAIR/SKIN/NAILS Tabs      Take 1 tablet by mouth daily.        HYDROcodone-acetaminophen 5-325 MG per tablet    NORCO    90 tablet    Take 1 tablet by mouth every 8 hours as needed for moderate to  severe pain    Multiple joint pain       IBANdronate 150 MG tablet    BONIVA    3 tablet    TAKE 1 TABLET EVERY 30 DAYS    Osteopenia, unspecified location       lisinopril 10 MG tablet    PRINIVIL/ZESTRIL    90 tablet    Take 1 tablet (10 mg) by mouth daily    Essential hypertension       metFORMIN 500 MG 24 hr tablet    GLUCOPHAGE-XR    180 tablet    Take 2 tablets (1,000 mg) by mouth daily (with dinner)    Type 2 diabetes mellitus with diabetic nephropathy, without long-term current use of insulin (H)       metoprolol succinate 100 MG 24 hr tablet    TOPROL-XL    90 tablet    Take 1 tablet (100 mg) by mouth daily    Essential hypertension       neomycin-polymyxin-hydrocortisone otic solution    CORTISPORIN     Place 4 drops into both ears 4 times daily        NITROQUICK SL      Place 0.4 mg under the tongue every 5 minutes as needed        nystatin-triamcinolone cream    MYCOLOG II    60 g    Apply topically 2 times daily    Tinea of the body       order for DME      DREAMSTATION 5-15 CM/H20 NASAL WISP FABRIC        TRUEPLUS LANCETS 28G Misc     100 each    1 each 2 times daily as needed    Type 2 diabetes mellitus with diabetic nephropathy (H)       TURMERIC PO           vitamin D 2000 UNITS Caps      Take by mouth daily        * Notice:  This list has 2 medication(s) that are the same as other medications prescribed for you. Read the directions carefully, and ask your doctor or other care provider to review them with you.

## 2018-04-03 NOTE — PROGRESS NOTES
SUBJECTIVE:   Moira Andre is a 84 year old female who presents to clinic today for the following health issues:    Earache    Duration: 3 days    Description (location/character/radiation): left ear    Intensity:  severe    Accompanying signs and symptoms: none    History (similar episodes/previous evaluation): yes, has hx of ear infections    Precipitating or alleviating factors: None    Therapies tried and outcome: heat pack, drops and acetaminophen     Diabetes Checkup  Reports BS levels have been high at home  Hasn't changed her eating habits  Endorses she's gotten a new machine to check BS  Has been forgetting to take her metformin    Depression Follow up  Reports depressive episodes and crying more often  Denies suicidal thoughts  Unsure why she's crying  Nothing stressful is going on in her life    Problem list and histories reviewed & adjusted, as indicated.  Additional history: as documented    Medications and Labs reviewed in EPIC    Reviewed and updated as needed this visit by clinical staff  Tobacco  Allergies  Meds  Problems  Med Hx  Surg Hx  Fam Hx  Soc Hx        Reviewed and updated as needed this visit by Provider     ROS:  Constitutional, HEENT, cardiovascular, pulmonary, GI, , musculoskeletal, neuro, skin, endocrine and psych systems are negative, except as otherwise noted.    POSITIVE for left otalgia     This document serves as a record of the services and decisions personally performed and made by Maryan Churchill MD. It was created on her behalf by Jeanette Ha, a trained medical scribe. The creation of this document is based on the provider's statements to the medical scribe.  Jeanette Ha 3:31 PM April 3, 2018    OBJECTIVE:     /80  Pulse 83  Temp 97.3  F (36.3  C) (Tympanic)  Wt 130.6 kg (288 lb)  SpO2 92%  BMI 45.11 kg/m2  Body mass index is 45.11 kg/(m^2).    GENERAL: morbidly obese, ambulated with walker, alert and no distress  HENT: slightly tender in  mastoid area, slightly tender when pulling external ear,ear canals and TM's normal, nose and mouth without ulcers or lesions  NECK: no adenopathy  SKIN: slight rash on chest and left upper arm  PSYCH: mentation appears normal, affect normal    Diagnostic Test Results:  No results found for this or any previous visit (from the past 24 hour(s)).    ASSESSMENT/PLAN:     Moira was seen today for otalgia.    Diagnoses and all orders for this visit:    Type 2 diabetes mellitus with diabetic nephropathy, without long-term current use of insulin (H)  -     Comprehensive metabolic panel  -     Hemoglobin A1c  -     Lipid panel reflex to direct LDL Non-fasting  -     TSH with free T4 reflex  Patient reports that she keeps forgetting to take her metformin with meals  Advised her to take it in the morning, if it's easier for her to remember  Emphasized importance of compliance with medication  Reports she hasn't changed her eating habits  However, BS level checks at home are high  Educated her about Trulicity as an option for her diabetes  Explained to her that it could help with weight loss  She will let me know if she'd like to go on it  Lab Results   Component Value Date    A1C 6.6 08/15/2017    A1C 6.7 04/11/2017    A1C 6.7 12/16/2016    A1C 6.7 09/16/2016    A1C 7.0 06/28/2016     Moderate major depression (H)  -     DULoxetine (CYMBALTA) 60 MG EC capsule; Take 1 capsule (60 mg) by mouth daily  Compliant with medication  Reports depressive episodes and crying more often  Denies suicidal thoughts  Unsure why she's crying  Nothing stressful is going on in her life  Provided patient with depression action plan    Morbid obesity (H)  Patient is morbidly obese  She has been gaining weight  Advised her on healthy eating and exercise habits  Educated her about Trulicity  She will let me know if she wants to go on it    Multiple joint pain  -     HYDROcodone-acetaminophen (NORCO) 5-325 MG per tablet; Take 1 tablet by mouth every 8  hours as needed for moderate to severe pain  Reports she takes norco occasionally for pain  Educated her that norco is a controlled substance. It can be habit-forming. It should be taken as prescribed. Do not mix it with alcohol. Be careful with driving. Do not lose the prescription. Do not overuse this medication.    Coronary artery disease involving native coronary artery of native heart without angina pectoris  -     clopidogrel (PLAVIX) 75 MG tablet; Take 1 tablet (75 mg) by mouth daily  Stable  Compliant with medication    Intermittent asthma, uncomplicated  -     albuterol (PROAIR HFA/PROVENTIL HFA/VENTOLIN HFA) 108 (90 BASE) MCG/ACT Inhaler; Inhale 2 puffs into the lungs every 6 hours as needed for shortness of breath / dyspnea  -     fluticasone-salmeterol (ADVAIR DISKUS) 100-50 MCG/DOSE diskus inhaler; Inhale 1 puff into the lungs 2 times daily  Doing well  Compliant with medication    Hyperlipidemia LDL goal <70  -     atorvastatin (LIPITOR) 20 MG tablet; Take 1 tablet (20 mg) by mouth daily  Doing well  Compliant with medication    Essential hypertension  -     lisinopril (PRINIVIL/ZESTRIL) 10 MG tablet; Take 1 tablet (10 mg) by mouth daily  -     metoprolol succinate (TOPROL-XL) 100 MG 24 hr tablet; Take 1 tablet (100 mg) by mouth daily  Compliant with medication  Her BP was slightly elevated in office visit today  Advised her to check BP at home and report readings higher than 140/90  Educated her about importance of low sodium diet    Left ear pain  -     amoxicillin-clavulanate (AUGMENTIN) 500-125 MG per tablet; Take 1 tablet by mouth 2 times daily for 7 days  -     neomycin-polymyxin-hydrocortisone (CORTISPORIN) otic solution; Place 4 drops into both ears 4 times daily  Patient has been complaining of left ear pain for past 3 days  She has history of recurrent infections  Upon examination, her TMs were clear  However, there was slight tenderness in mastoid area and when pulling on external ear  No  adenopathy was seen  Because patient is high risk and diabetic, prescribed Augmentin  Advised her to finish course of antibitiocs  ENT referral if no improvement.    Other chronic pain  -     Drug Abuse Screen Panel 13, Urine (Pain Care Package)    Medication management  -     CBC with platelets  -     Drug Abuse Screen Panel 13, Urine (Pain Care Package)    Counseling regarding advanced directives  Patient has taken care of her advanced directives  She is supportive of her decision    Rheumatoid arthritis involving both hands with negative rheumatoid factor (H)  Doing well   diagnosis is questionable  She has not followed up with rheumatology at    Other orders  -     Cancel: neomycin-polymyxin-hydrocortisone (CORTISPORIN) otic solution; Place 4 drops into both ears 4 times daily  -     DEPRESSION ACTION PLAN (DAP)    Sleep Apnea  Doing well  Uses CPAP nightly    Patient Instructions   Labs today  I refilled your prescriptions  Take Augmentin twice daily for 7 days  Think about Trulicity injection once a week for your diabetes  Let me know if you would like to go on it  Take metformin in the morning daily  This medication is good for your diabetes and will help control it  Norco is a controlled substance. It can be habit-forming. It should be taken as prescribed. Do not mix it with alcohol. Be careful with driving. Do not lose the prescription. Do not overuse this medication.  Tylenol is safe to use for pain  Schedule an appointment for physical at your earliest convenience  Seek sooner medical attention if there is any worsening of symptoms or problems    The information in this document, created by the medical scribe for me, accurately reflects the services I personally performed and the decisions made by me. I have reviewed and approved this document for accuracy prior to leaving the patient care area.  April 3, 2018 3:56 PM    Maryan Churchill MD  [facility]

## 2018-04-03 NOTE — LETTER
[place] [address] [facility]  [address]  [place]  [address]  Tel: [phone]    April 4, 2018    Moira ALEGRIA Jes  [address] [facility] [address]  [place] [address]        Dear Ms. Andre,    This is to inform you regarding your test result.     Urine drug screen is satisfactory .     If you have any further questions or problems, please contact our office.      Sincerely,    Maryan Churchill MD/en[place]    Results for orders placed or performed in visit on 04/03/18   Comprehensive metabolic panel   Result Value Ref Range    Sodium 140 133 - 144 mmol/L    Potassium 4.3 3.4 - 5.3 mmol/L    Chloride 106 94 - 109 mmol/L    Carbon Dioxide 25 20 - 32 mmol/L    Anion Gap 9 3 - 14 mmol/L    Glucose 145 (H) 70 - 99 mg/dL    Urea Nitrogen 24 7 - 30 mg/dL    Creatinine 1.18 (H) 0.52 - 1.04 mg/dL    GFR Estimate 44 (L) >60 mL/min/1.7m2    GFR Estimate If Black 53 (L) >60 mL/min/1.7m2    Calcium 8.9 8.5 - 10.1 mg/dL    Bilirubin Total 0.2 0.2 - 1.3 mg/dL    Albumin 3.7 3.4 - 5.0 g/dL    Protein Total 7.4 6.8 - 8.8 g/dL    Alkaline Phosphatase 66 40 - 150 U/L    ALT 16 0 - 50 U/L    AST 15 0 - 45 U/L   CBC with platelets   Result Value Ref Range    WBC 7.4 4.0 - 11.0 10e9/L    RBC Count 5.06 3.8 - 5.2 10e12/L    Hemoglobin 13.4 11.7 - 15.7 g/dL    Hematocrit 43.4 35.0 - 47.0 %    MCV 86 78 - 100 fl    MCH 26.5 26.5 - 33.0 pg    MCHC 30.9 (L) 31.5 - 36.5 g/dL    RDW 14.7 10.0 - 15.0 %    Platelet Count 307 150 - 450 10e9/L   Hemoglobin A1c   Result Value Ref Range    Hemoglobin A1C 7.2 (H) 0 - 6.4 %   Lipid panel reflex to direct LDL Non-fasting   Result Value Ref Range    Cholesterol 174 <200 mg/dL    Triglycerides 279 (H) <150 mg/dL    HDL Cholesterol 44 (L) >49 mg/dL    LDL Cholesterol Calculated 74 <100 mg/dL    Non HDL Cholesterol 130 (H) <130 mg/dL   Drug Abuse Screen Panel 13, Urine (Pain Care Package)   Result Value Ref Range    Cannabinoids (92-dwl-8-carboxy-9-THC) Not Detected NDET^Not Detected ng/mL     Phencyclidine (Phencyclidine) Not Detected NDET^Not Detected ng/mL    Cocaine (Benzoylecgonine) Not Detected NDET^Not Detected ng/mL    Methamphetamine (d-Methamphetamine) Not Detected NDET^Not Detected ng/mL    Opiates (Morphine) Detected, Abnormal Result (A) NDET^Not Detected ng/mL    Amphetamine (d-Amphetamine) Not Detected NDET^Not Detected ng/mL    Benzodiazepines (Nordiazepam) Not Detected NDET^Not Detected ng/mL    Tricyclic Antidepressants (Desipramine) Not Detected NDET^Not Detected ng/mL    Methadone (Methadone) Not Detected NDET^Not Detected ng/mL    Barbiturates (Butalbital) Not Detected NDET^Not Detected ng/mL    Oxycodone (Oxycodone) Not Detected NDET^Not Detected ng/mL    Propoxyphene (Norpropoxyphene) Not Detected NDET^Not Detected ng/mL    Buprenorphine (Buprenorphine) Not Detected NDET^Not Detected ng/mL   TSH with free T4 reflex   Result Value Ref Range    TSH 2.11 0.40 - 4.00 mU/L           Enclosure: Lab Results

## 2018-04-03 NOTE — LETTER
[facility]  [address] [facility]  [address]  [place]  [address]  Tel: [phone]    April 4, 2018    Moira ALEGRIA Jes  [address]  [facility] [address]        Dear Ms. Andre,    This is to inform you regarding your test result.    TSH which is thyroid hormone is normal.  Chronic kidney disease is stable.  Liver test is fine.  Your total cholesterol is normal.  The triglycerides are high. Lowering  the amount of sugar ,alcohol and sweets in the diet helps to control this.Exercise and weight loss helps.  HDL which is called good cholesterol is low.  Your LDL cholesterol is normal.  This is often call bad cholesterol and high levels increase the risk for heart attacks and strokes.  CBC result which includes Hemoglobin and  Platelet Counts is satisfactory.  HbA1c which is average glucose during last 3 months is 7.2%.  This has gone up.  Start taking metformin as discussed during office visit  I recommend weekly trulicity injection  If you agree then make appointment to discuss that .    Sincerely,    Maryan Churchill MD/JOSÉ MIGUEL          Enclosure: Lab Results  Results for orders placed or performed in visit on 04/03/18   Comprehensive metabolic panel   Result Value Ref Range    Sodium 140 133 - 144 mmol/L    Potassium 4.3 3.4 - 5.3 mmol/L    Chloride 106 94 - 109 mmol/L    Carbon Dioxide 25 20 - 32 mmol/L    Anion Gap 9 3 - 14 mmol/L    Glucose 145 (H) 70 - 99 mg/dL    Urea Nitrogen 24 7 - 30 mg/dL    Creatinine 1.18 (H) 0.52 - 1.04 mg/dL    GFR Estimate 44 (L) >60 mL/min/1.7m2    GFR Estimate If Black 53 (L) >60 mL/min/1.7m2    Calcium 8.9 8.5 - 10.1 mg/dL    Bilirubin Total 0.2 0.2 - 1.3 mg/dL    Albumin 3.7 3.4 - 5.0 g/dL    Protein Total 7.4 6.8 - 8.8 g/dL    Alkaline Phosphatase 66 40 - 150 U/L    ALT 16 0 - 50 U/L    AST 15 0 - 45 U/L   CBC with platelets   Result Value Ref Range    WBC 7.4 4.0 - 11.0 10e9/L    RBC Count 5.06 3.8 - 5.2 10e12/L    Hemoglobin 13.4 11.7 - 15.7 g/dL    Hematocrit  43.4 35.0 - 47.0 %    MCV 86 78 - 100 fl    MCH 26.5 26.5 - 33.0 pg    MCHC 30.9 (L) 31.5 - 36.5 g/dL    RDW 14.7 10.0 - 15.0 %    Platelet Count 307 150 - 450 10e9/L   Hemoglobin A1c   Result Value Ref Range    Hemoglobin A1C 7.2 (H) 0 - 6.4 %   Lipid panel reflex to direct LDL Non-fasting   Result Value Ref Range    Cholesterol 174 <200 mg/dL    Triglycerides 279 (H) <150 mg/dL    HDL Cholesterol 44 (L) >49 mg/dL    LDL Cholesterol Calculated 74 <100 mg/dL    Non HDL Cholesterol 130 (H) <130 mg/dL   TSH with free T4 reflex   Result Value Ref Range    TSH 2.11 0.40 - 4.00 mU/L

## 2018-04-04 ENCOUNTER — TELEPHONE (OUTPATIENT)
Dept: FAMILY MEDICINE | Facility: CLINIC | Age: 83
End: 2018-04-04

## 2018-04-04 DIAGNOSIS — H66.90 ACUTE OTITIS MEDIA, UNSPECIFIED OTITIS MEDIA TYPE: Primary | ICD-10-CM

## 2018-04-04 LAB
ALBUMIN SERPL-MCNC: 3.7 G/DL (ref 3.4–5)
ALP SERPL-CCNC: 66 U/L (ref 40–150)
ALT SERPL W P-5'-P-CCNC: 16 U/L (ref 0–50)
AMPHETAMINES UR QL: NOT DETECTED NG/ML
ANION GAP SERPL CALCULATED.3IONS-SCNC: 9 MMOL/L (ref 3–14)
AST SERPL W P-5'-P-CCNC: 15 U/L (ref 0–45)
BARBITURATES UR QL SCN: NOT DETECTED NG/ML
BENZODIAZ UR QL SCN: NOT DETECTED NG/ML
BILIRUB SERPL-MCNC: 0.2 MG/DL (ref 0.2–1.3)
BUN SERPL-MCNC: 24 MG/DL (ref 7–30)
BUPRENORPHINE UR QL: NOT DETECTED NG/ML
CALCIUM SERPL-MCNC: 8.9 MG/DL (ref 8.5–10.1)
CANNABINOIDS UR QL: NOT DETECTED NG/ML
CHLORIDE SERPL-SCNC: 106 MMOL/L (ref 94–109)
CHOLEST SERPL-MCNC: 174 MG/DL
CO2 SERPL-SCNC: 25 MMOL/L (ref 20–32)
COCAINE UR QL SCN: NOT DETECTED NG/ML
CREAT SERPL-MCNC: 1.18 MG/DL (ref 0.52–1.04)
D-METHAMPHET UR QL: NOT DETECTED NG/ML
GFR SERPL CREATININE-BSD FRML MDRD: 44 ML/MIN/1.7M2
GLUCOSE SERPL-MCNC: 145 MG/DL (ref 70–99)
HDLC SERPL-MCNC: 44 MG/DL
LDLC SERPL CALC-MCNC: 74 MG/DL
METHADONE UR QL SCN: NOT DETECTED NG/ML
NONHDLC SERPL-MCNC: 130 MG/DL
OPIATES UR QL SCN: ABNORMAL NG/ML
OXYCODONE UR QL SCN: NOT DETECTED NG/ML
PCP UR QL SCN: NOT DETECTED NG/ML
POTASSIUM SERPL-SCNC: 4.3 MMOL/L (ref 3.4–5.3)
PROPOXYPH UR QL: NOT DETECTED NG/ML
PROT SERPL-MCNC: 7.4 G/DL (ref 6.8–8.8)
SODIUM SERPL-SCNC: 140 MMOL/L (ref 133–144)
TRICYCLICS UR QL SCN: NOT DETECTED NG/ML
TRIGL SERPL-MCNC: 279 MG/DL
TSH SERPL DL<=0.005 MIU/L-ACNC: 2.11 MU/L (ref 0.4–4)

## 2018-04-04 RX ORDER — AZITHROMYCIN 250 MG/1
TABLET, FILM COATED ORAL
Qty: 6 TABLET | Refills: 0 | Status: SHIPPED | OUTPATIENT
Start: 2018-04-04 | End: 2018-04-24

## 2018-04-04 NOTE — TELEPHONE ENCOUNTER
"Returned patient's call:     S: Pt started on Augmentin for possible ear infection     B: Pt states she has taken plain Amoxicillin in the past with no issues     A:     Nauseated \"all the time\" since starting Augmentin     Nausea started shortly after taking 1st dose     Dry-heaving in the middle of the night, no vomiting     Denies fever  Denies diarrhea     Has taken one tablet of meclizine with no relief.     Pt states that she would like to not take her dose this evening. Discussed the importance of taking the complete prescription of antibiotics to clear her ear infection, but that a message would be sent to the provider to see if it can be changed/advise.     To PCP/DOD: Any advice?           "

## 2018-04-04 NOTE — TELEPHONE ENCOUNTER
Reason for call:  Patient reporting a symptom    Symptom or request: severe nausea after starting her Amoxicillin     Duration (how long have symptoms been present): 1.5 days    Have you been treated for this before? NO     Additional comments: pt said her nausea is severe and it started last night after starting her Amoxicillin       Phone Number patient can be reached at:  Home number on file [phone] (home)    Best Time:  anytime    Can we leave a detailed message on this number:  YES    Call taken on 4/4/2018 at 12:28 PM by Karri Sanchez

## 2018-04-04 NOTE — PROGRESS NOTES
Please notify patient by sending following letter with copy of test results      Mert iPzarro,    This is to inform you regarding your test result.    Urine drug screen is satisfactory .    Sincerely,      Dr.Nasima Kerline MD,FACP

## 2018-04-04 NOTE — PROGRESS NOTES
Please notify patient by sending following letter with copy of test results      Mert Pizarro,    This is to inform you regarding your test result.    TSH which is thyroid hormone is normal.  Chronic kidney disease is stable.  Liver test is fine.  Your total cholesterol is normal.  The triglycerides are high. Lowering  the amount of sugar ,alcohol and sweets in the diet helps to control this.Exercise and weight loss helps.  HDL which is called good cholesterol is low.  Your LDL cholesterol is normal.  This is often call bad cholesterol and high levels increase the risk for heart attacks and strokes.  CBC result which includes Hemoglobin and  Platelet Counts is satisfactory.  HbA1c which is average glucose during last 3 months is 7.2%.  This has gone up.  Start taking metformin as discussed during office visit  I recommend weekly trulicity injection  If you agree then make appointment to discuss that .      Sincerely,      Dr.Nasima Kerline MD,FACP

## 2018-04-04 NOTE — TELEPHONE ENCOUNTER
Called pharmacy  Their Z-Dustin does not contain yellow dye   Patient notified to stop Augmentin and start Azithromycin instead - informed her of Rx sent     Lucia MCKNIGHT RN

## 2018-04-04 NOTE — TELEPHONE ENCOUNTER
Have patient discontinue Augmentin.  Take azithromycin  Patient is allergic to yellow dye  Check with pharmacy to make sure there is no yellow dye in this.  Script of z-gino is sent   Dr.Nasima Kerline MD

## 2018-04-05 ASSESSMENT — PATIENT HEALTH QUESTIONNAIRE - PHQ9: SUM OF ALL RESPONSES TO PHQ QUESTIONS 1-9: 7

## 2018-04-05 ASSESSMENT — ASTHMA QUESTIONNAIRES: ACT_TOTALSCORE: 18

## 2018-04-11 ENCOUNTER — TRANSFERRED RECORDS (OUTPATIENT)
Dept: HEALTH INFORMATION MANAGEMENT | Facility: CLINIC | Age: 83
End: 2018-04-11

## 2018-04-13 DIAGNOSIS — J45.20 INTERMITTENT ASTHMA, UNCOMPLICATED: ICD-10-CM

## 2018-04-14 NOTE — TELEPHONE ENCOUNTER
"Last Written Prescription Date:  4/03/18  Last Fill Quantity: 3 inhaler,  # refills: 3   Last office visit: 4/3/2018 with prescribing provider:  Kerline   Future Office Visit:      Requested Prescriptions   Pending Prescriptions Disp Refills     ADVAIR DISKUS 100-50 MCG/DOSE diskus inhaler [Pharmacy Med Name: ADVAIR DISKUS 100-50 MCG/DOSE Aerosol Powder Breath Activated]  0     Sig: INHALE 1 PUFF EVERY 12 HOURS    Inhaled Steroids Protocol Failed    4/13/2018  6:57 PM       Failed - Asthma control assessment score within normal limits in last 6 months    Please review ACT score.          Passed - Patient is age 12 or older       Passed - Recent (6 mo) or future (30 days) visit within the authorizing provider's specialty    Patient had office visit in the last 6 months or has a visit in the next 30 days with authorizing provider or within the authorizing provider's specialty.  See \"Patient Info\" tab in inbasket, or \"Choose Columns\" in Meds & Orders section of the refill encounter.            ACT Total Scores 12/16/2016 8/15/2017 4/4/2018   ACT TOTAL SCORE - - -   ASTHMA ER VISITS - - -   ASTHMA HOSPITALIZATIONS - - -   ACT TOTAL SCORE (Goal Greater than or Equal to 20) 20 21 18   In the past 12 months, how many times did you visit the emergency room for your asthma without being admitted to the hospital? 0 0 0   In the past 12 months, how many times were you hospitalized overnight because of your asthma? 0 0 0       "

## 2018-04-17 ENCOUNTER — TRANSFERRED RECORDS (OUTPATIENT)
Dept: HEALTH INFORMATION MANAGEMENT | Facility: CLINIC | Age: 83
End: 2018-04-17

## 2018-04-24 ENCOUNTER — OFFICE VISIT (OUTPATIENT)
Dept: URGENT CARE | Facility: URGENT CARE | Age: 83
End: 2018-04-24
Payer: COMMERCIAL

## 2018-04-24 VITALS
SYSTOLIC BLOOD PRESSURE: 120 MMHG | WEIGHT: 280 LBS | DIASTOLIC BLOOD PRESSURE: 78 MMHG | BODY MASS INDEX: 43.85 KG/M2 | TEMPERATURE: 97.6 F | HEART RATE: 84 BPM

## 2018-04-24 DIAGNOSIS — H60.392 INFECTIVE OTITIS EXTERNA, LEFT: ICD-10-CM

## 2018-04-24 DIAGNOSIS — H92.02 LEFT EAR PAIN: Primary | ICD-10-CM

## 2018-04-24 PROCEDURE — 99213 OFFICE O/P EST LOW 20 MIN: CPT | Performed by: PHYSICIAN ASSISTANT

## 2018-04-24 RX ORDER — CIPROFLOXACIN AND DEXAMETHASONE 3; 1 MG/ML; MG/ML
4 SUSPENSION/ DROPS AURICULAR (OTIC) 2 TIMES DAILY
Qty: 7.5 ML | Refills: 0 | Status: SHIPPED | OUTPATIENT
Start: 2018-04-24 | End: 2018-05-01

## 2018-04-24 NOTE — PROGRESS NOTES
SUBJECTIVE:  Moira Andre is a 84 year old female who presents with left ear pain for 2 day(s).   Severity: mild and moderate   Timing:sudden onset  Additional symptoms include left ear pain.      History of recurrent otitis: none    Past Medical History:   Diagnosis Date     Aortic valve sclerosis     heart murmur, no AS     Arrhythmia     PAT, PVC     Aspirin allergy     Plavix use long term     Asthma      CKD (chronic kidney disease) stage 3, GFR 30-59 ml/min     x 2007 atleast     Congestive heart failure, unspecified      Depression      Diabetes mellitus (H) 2010     Diastolic dysfunction, left ventricle 2013    grade 2, nl ef     HTN (hypertension)      Migraine headache      Myocardial infarction 9/2007, cath 2013 ml    BMS: stent to OM, diag, nl EF, echo /C angia 2013 , f/u cath no lesion >40%     OA (osteoarthritis) of knee      Obesity      Rheumatoid arthritis flare (H)     prednisone     Sleep apnea     on CPAP (not using 2016)     TIA (transient ischaemic attack)      Ventral hernia, unspecified, without mention of obstruction or gangrene      Patient Active Problem List   Diagnosis     CKD (chronic kidney disease) stage 3, GFR 30-59 ml/min     Morbid obesity (H)     CAD (coronary artery disease)     Type 2 diabetes mellitus with diabetic nephropathy (H)     Transient cerebral ischemia     Hyperlipidemia LDL goal <70     Advanced directives, counseling/discussion     Ventral hernia     Intermittent asthma     Moderate major depression (H)     ELIGIO on CPAP     Microalbuminuria     S/P total knee arthroplasty     OA (osteoarthritis) of knee     Anemia due to blood loss, acute     Health Care Home     Essential hypertension     Gastroesophageal reflux disease without esophagitis     Bilateral edema of lower extremity     Osteoarthritis     Rheumatoid arthritis involving both hands with negative rheumatoid factor (H)     High risk medication use     Anxiety     Other chronic pain     Controlled substance  agreement signed     Social History   Substance Use Topics     Smoking status: Former Smoker     Quit date: 4/24/1973     Smokeless tobacco: Never Used     Alcohol use 0.0 - 0.6 oz/week     0 - 1 Standard drinks or equivalent per week      Comment: rarely       ROS:   CONSTITUTIONAL:NEGATIVE for fever, chills, change in weight  INTEGUMENTARY/SKIN: NEGATIVE for worrisome rashes, moles or lesions  ENT/MOUTH: POSITIVE for left ear pain, tenderness  RESP:NEGATIVE for significant cough or SOB  CV: NEGATIVE for chest pain, palpitations or peripheral edema  GI: NEGATIVE for nausea, abdominal pain, heartburn, or change in bowel habits  MUSCULOSKELETAL: NEGATIVE for significant arthralgias or myalgia  NEURO: NEGATIVE for weakness, dizziness or paresthesias    OBJECTIVE:  /78  Pulse 84  Temp 97.6  F (36.4  C)  Wt 280 lb (127 kg)  BMI 43.85 kg/m2   EXAM:  The right TM is normal: no effusions, no erythema, and normal landmarks     The right auditory canal is normal and without drainage, edema or erythema  The left TM is normal: no effusions, no erythema, and normal landmarks  The left auditory canal is erythematous, swollen and tender  Oropharynx exam is normal: no lesions, erythema, adenopathy or exudate.  GENERAL: no acute distress  EYES: EOMI,  PERRL, conjunctiva clear  NECK: supple, non-tender to palpation, no adenopathy noted  RESP: lungs clear to auscultation - no rales, rhonchi or wheezes  CV: regular rates and rhythm, normal S1 S2, no murmur noted  SKIN: no suspicious lesions or rashes     ASSESSMENT/PLAN:    ICD-10-CM    1. Left ear pain H92.02    2. Infective otitis externa, left H60.392 ciprofloxacin-dexamethasone (CIPRODEX) otic suspension       Avoid being in the pool for 1 week  Avoid using q tips and ear plugs at this time  Follow up as needed  See orders in Epic

## 2018-04-24 NOTE — MR AVS SNAPSHOT
"              After Visit Summary   2018    Moira Andre    MRN: [id number]           Patient Information     Date Of Birth          1933        Visit Information        Provider Department      2018 10:20 AM Isak Freitas PA-C [facility]        Today's Diagnoses     Left ear pain    -  1    Infective otitis externa, left           Follow-ups after your visit        Who to contact     If you have questions or need follow up information about today's clinic visit or your schedule please contact [facility] directly at [phone].  Normal or non-critical lab and imaging results will be communicated to you by MyChart, letter or phone within 4 business days after the clinic has received the results. If you do not hear from us within 7 days, please contact the clinic through [employer] or phone. If you have a critical or abnormal lab result, we will notify you by phone as soon as possible.  Submit refill requests through [employer] or call your pharmacy and they will forward the refill request to us. Please allow 3 business days for your refill to be completed.          Additional Information About Your Visit        MyChart Information     [employer] lets you send messages to your doctor, view your test results, renew your prescriptions, schedule appointments and more. To sign up, go to [email or web address] . Click on \"Log in\" on the left side of the screen, which will take you to the Welcome page. Then click on \"Sign up Now\" on the right side of the page.     You will be asked to enter the access code listed below, as well as some personal information. Please follow the directions to create your username and password.     Your access code is: [email or web address]  Expires: 2018  3:53 PM     Your access code will  in 90 days. If you need help or a new code, please call your [place] clinic or [phone].        Care EveryWhere ID     This is " your Care EveryWhere ID. This could be used by other organizations to access your [facility] medical records  [id number]        Your Vitals Were     Pulse Temperature BMI (Body Mass Index)             84 97.6  F (36.4  C) 43.85 kg/m2          Blood Pressure from Last 3 Encounters:   04/24/18 120/78   04/03/18 139/80   08/15/17 124/70    Weight from Last 3 Encounters:   04/24/18 280 lb (127 kg)   04/03/18 288 lb (130.6 kg)   08/15/17 281 lb (127.5 kg)              Today, you had the following     No orders found for display         Today's Medication Changes          These changes are accurate as of 4/24/18 11:00 AM.  If you have any questions, ask your nurse or doctor.               Start taking these medicines.        Dose/Directions    ciprofloxacin-dexamethasone otic suspension   Commonly known as:  CIPRODEX   Used for:  Infective otitis externa, left   Started by:  Isak Freitas PA-C        Dose:  4 drop   Place 4 drops Into the left ear 2 times daily for 7 days   Quantity:  7.5 mL   Refills:  0         These medicines have changed or have updated prescriptions.        Dose/Directions    nystatin-triamcinolone cream   Commonly known as:  MYCOLOG II   This may have changed:    - when to take this  - reasons to take this   Used for:  Tinea of the body        Apply topically 2 times daily   Quantity:  60 g   Refills:  1            Where to get your medicines      These medications were sent to [facility] [address] [address]     Phone:  [phone]     ciprofloxacin-dexamethasone otic suspension                Primary Care Provider Office Phone # Fax #    Maryan Churchill [phone] [address] [facility] [address]  [facility] [address]        Equal Access to Services     HUMA AGUILAR AH: Diane Grey, kristina de jesus, selena obando, andreina bell. So [facility]  [phone].    ATENCIÓN: Si shan hogan, tiene a gold disposición servicios gratuitos de asistencia lingüística. Chuck hagen [phone].    We comply with applicable federal civil rights laws and [place] laws. We do not discriminate on the basis of race, color, national origin, age, disability, sex, sexual orientation, or gender identity.            Thank you!     Thank you for choosing [facility]  for your care. Our goal is always to provide you with excellent care. Hearing back from our patients is one way we can continue to improve our services. Please take a few minutes to complete the written survey that you may receive in the mail after your visit with us. Thank you!             Your Updated Medication List - Protect others around you: Learn how to safely use, store and throw away your medicines at www.disposemymeds.org.          This list is accurate as of 4/24/18 11:00 AM.  Always use your most recent med list.                   Brand Name Dispense Instructions for use Diagnosis    acetaminophen 325 MG tablet    TYLENOL     Take 325-650 mg by mouth every 6 hours as needed for mild pain        ADVAIR DISKUS 100-50 MCG/DOSE diskus inhaler   Generic drug:  fluticasone-salmeterol     3 Inhaler    INHALE 1 PUFF EVERY 12 HOURS    Intermittent asthma, uncomplicated       albuterol 108 (90 Base) MCG/ACT Inhaler    PROAIR HFA/PROVENTIL HFA/VENTOLIN HFA    1 Inhaler    Inhale 2 puffs into the lungs every 6 hours as needed for shortness of breath / dyspnea    Intermittent asthma, uncomplicated       atorvastatin 20 MG tablet    LIPITOR    90 tablet    Take 1 tablet (20 mg) by mouth daily    Hyperlipidemia LDL goal <70       * blood glucose monitoring meter device kit    no brand specified    1 kit    Use to test blood sugar 1-2 times daily or as directed.    Type 2 diabetes mellitus with diabetic nephropathy (H)       * TRUE METRIX AIR GLUCOSE METER w/Device Kit     1 kit    USE AS  DIRECTED    Type 2 diabetes mellitus with diabetic nephropathy (H)       blood glucose monitoring test strip    TRUE METRIX BLOOD GLUCOSE TEST    200 strip    1 strip by In Vitro route daily    Type 2 diabetes mellitus with diabetic nephropathy, without long-term current use of insulin (H)       calcium-vitamin D 600-400 MG-UNIT per tablet    CALTRATE     Take 1 tablet by mouth daily        ciprofloxacin-dexamethasone otic suspension    CIPRODEX    7.5 mL    Place 4 drops Into the left ear 2 times daily for 7 days    Infective otitis externa, left       clopidogrel 75 MG tablet    PLAVIX    90 tablet    Take 1 tablet (75 mg) by mouth daily    Coronary artery disease involving native coronary artery of native heart without angina pectoris       diclofenac 1 % Gel topical gel    VOLTAREN    100 g    Apply 4 grams to knees or 2 grams to hands four times daily using enclosed dosing card.    Acute pain of right shoulder       DULoxetine 60 MG EC capsule    CYMBALTA    90 capsule    Take 1 capsule (60 mg) by mouth daily    Moderate major depression (H)       FEXOFENADINE HCL PO      Take 180 mg by mouth as needed        FLONASE 50 MCG/ACT spray   Generic drug:  fluticasone      Spray 1 spray into both nostrils as needed        Glucosamine HCl 750 MG Tabs      Take 1 tablet by mouth 2 times daily        HAIR SKIN AND NAILS FORMULA PO      Take by mouth daily        HYDROcodone-acetaminophen 5-325 MG per tablet    NORCO    90 tablet    Take 1 tablet by mouth every 8 hours as needed for moderate to severe pain    Multiple joint pain       IBANdronate 150 MG tablet    BONIVA    3 tablet    TAKE 1 TABLET EVERY 30 DAYS    Osteopenia, unspecified location       lisinopril 10 MG tablet    PRINIVIL/ZESTRIL    90 tablet    Take 1 tablet (10 mg) by mouth daily    Essential hypertension       metFORMIN 500 MG 24 hr tablet    GLUCOPHAGE-XR    180 tablet    Take 2 tablets (1,000 mg) by mouth daily (with dinner)    Type 2 diabetes  mellitus with diabetic nephropathy, without long-term current use of insulin (H)       metoprolol succinate 100 MG 24 hr tablet    TOPROL-XL    90 tablet    Take 1 tablet (100 mg) by mouth daily    Essential hypertension       neomycin-polymyxin-hydrocortisone otic solution    CORTISPORIN    10 mL    Place 4 drops into both ears 4 times daily    Left ear pain       NITROQUICK SL      Place 0.4 mg under the tongue every 5 minutes as needed        nystatin-triamcinolone cream    MYCOLOG II    60 g    Apply topically 2 times daily    Tinea of the body       order for DME      DREAMSTATION 5-15 CM/H20 NASAL WISP FABRIC        TRUEPLUS LANCETS 28G Misc     100 each    1 each 2 times daily as needed    Type 2 diabetes mellitus with diabetic nephropathy (H)       TURMERIC PO           vitamin D 2000 units Caps      Take by mouth daily        * Notice:  This list has 2 medication(s) that are the same as other medications prescribed for you. Read the directions carefully, and ask your doctor or other care provider to review them with you.

## 2018-05-02 DIAGNOSIS — I10 ESSENTIAL HYPERTENSION: ICD-10-CM

## 2018-05-02 NOTE — TELEPHONE ENCOUNTER
Pt requesting refill Lisinopril 10mg tab to be sent to [facility].  Most recent refill was sent to [facility] by mistake.  She has only a weeks supply left.  Moira may be reached at [phone]. Ok to leave a detailed message.

## 2018-05-03 RX ORDER — LISINOPRIL 10 MG/1
10 TABLET ORAL DAILY
Qty: 90 TABLET | Refills: 1 | Status: SHIPPED | OUTPATIENT
Start: 2018-05-03 | End: 2018-07-09

## 2018-05-03 NOTE — TELEPHONE ENCOUNTER
"Last Written Prescription Date:  4/03/18  Last Fill Quantity: 90 tablet,  # refills: 1   Last office visit: 4/3/2018 with prescribing provider:  Kerline   Future Office Visit:      Requested Prescriptions   Pending Prescriptions Disp Refills     lisinopril (PRINIVIL/ZESTRIL) 10 MG tablet 90 tablet 1     Sig: Take 1 tablet (10 mg) by mouth daily    ACE Inhibitors (Including Combos) Protocol Failed    5/2/2018  4:57 PM       Failed - Normal serum creatinine on file in past 12 months    Recent Labs   Lab Test  04/03/18   1559   CR  1.18*            Passed - Blood pressure under 140/90 in past 12 months    BP Readings from Last 3 Encounters:   04/24/18 120/78   04/03/18 139/80   08/15/17 124/70                Passed - Recent (12 mo) or future (30 days) visit within the authorizing provider's specialty    Patient had office visit in the last 12 months or has a visit in the next 30 days with authorizing provider or within the authorizing provider's specialty.  See \"Patient Info\" tab in inbasket, or \"Choose Columns\" in Meds & Orders section of the refill encounter.           Passed - Patient is age 18 or older       Passed - No active pregnancy on record       Passed - Normal serum potassium on file in past 12 months    Recent Labs   Lab Test  04/03/18   1559   POTASSIUM  4.3            Passed - No positive pregnancy test in past 12 months          "

## 2018-05-03 NOTE — TELEPHONE ENCOUNTER
Noted Lisinopril sent 4/3/18 #90 with 1 refill to local pharmacy   Pt wanted Rx at mail order instead     Per Rx sent previously, resent to requested pharmacy  Called [facility] to cancel Rx there     Lucia MCKNIGHT RN

## 2018-05-21 DIAGNOSIS — I10 ESSENTIAL HYPERTENSION: ICD-10-CM

## 2018-05-22 RX ORDER — METOPROLOL SUCCINATE 100 MG/1
TABLET, EXTENDED RELEASE ORAL
Qty: 90 TABLET | Refills: 3 | Status: SHIPPED | OUTPATIENT
Start: 2018-05-22 | End: 2018-09-17

## 2018-05-22 NOTE — TELEPHONE ENCOUNTER
"metoprolol succinate (TOPROL-XL) 100 MG 24 hr tablet 90 tablet 3 4/3/2018     Last Written Prescription Date:  4/3/18  Last Fill Quantity: 90,  # refills: 3   Last office visit: 4/3/2018 with prescribing provider:  Kerline   Future Office Visit:  none    Requested Prescriptions   Pending Prescriptions Disp Refills     metoprolol succinate (TOPROL-XL) 100 MG 24 hr tablet [Pharmacy Med Name: METOPROLOL SUCCINATE  MG Tablet Extended Release 24 Hour] 90 tablet 0     Sig: TAKE 1 TABLET EVERY DAY  (DUE  FOR  OFFICE  VISIT, PLEASE MAKE APPT.)    Beta-Blockers Protocol Passed    5/21/2018  7:17 PM       Passed - Blood pressure under 140/90 in past 12 months    BP Readings from Last 3 Encounters:   04/24/18 120/78   04/03/18 139/80   08/15/17 124/70                Passed - Patient is age 6 or older       Passed - Recent (12 mo) or future (30 days) visit within the authorizing provider's specialty    Patient had office visit in the last 12 months or has a visit in the next 30 days with authorizing provider or within the authorizing provider's specialty.  See \"Patient Info\" tab in inbasket, or \"Choose Columns\" in Meds & Orders section of the refill encounter.            PHQ-9 SCORE 4/11/2017 8/15/2017 4/4/2018   Total Score - - -   Total Score 8 4 7     "

## 2018-05-31 DIAGNOSIS — E78.5 HYPERLIPIDEMIA LDL GOAL <70: ICD-10-CM

## 2018-06-01 RX ORDER — ATORVASTATIN CALCIUM 20 MG/1
TABLET, FILM COATED ORAL
Qty: 90 TABLET | Refills: 1
Start: 2018-06-01

## 2018-06-11 DIAGNOSIS — E78.5 HYPERLIPIDEMIA LDL GOAL <70: ICD-10-CM

## 2018-06-11 NOTE — TELEPHONE ENCOUNTER
atorvastatin (LIPITOR) 20 MG tablet 90 tablet 3 4/3/2018  No      Sig - Route: Take 1 tablet (20 mg) by mouth daily - Oral     Rx was sent to [facility]  Last Written Prescription Date:  4-3-2018  Last Fill Quantity: 90,  # refills: 3   Last office visit: 4/3/2018 with prescribing provider:     Future Office Visit:  Unknown    Rx was sent to [facility] on 4-3-2018      Patient requesting rx from [facility]

## 2018-06-12 NOTE — TELEPHONE ENCOUNTER
Mail order does not have E-prescribe capabilities.  Will have [facility] staff address since Rx needs to be printed and signed by PCP  Janet Partida RN

## 2018-06-13 RX ORDER — ATORVASTATIN CALCIUM 20 MG/1
20 TABLET, FILM COATED ORAL DAILY
Qty: 90 TABLET | Refills: 3 | Status: SHIPPED | OUTPATIENT
Start: 2018-06-13 | End: 2019-06-19

## 2018-06-13 NOTE — TELEPHONE ENCOUNTER
[facility] pharmacy [facility] that does accept E-prescribe.    Prescription approved per [facility] Refill Protocol.  Reyna TUTTLE RN,BSN

## 2018-07-04 ENCOUNTER — APPOINTMENT (OUTPATIENT)
Dept: CT IMAGING | Facility: CLINIC | Age: 83
End: 2018-07-04
Attending: EMERGENCY MEDICINE
Payer: COMMERCIAL

## 2018-07-04 ENCOUNTER — HOSPITAL ENCOUNTER (OUTPATIENT)
Facility: CLINIC | Age: 83
Setting detail: OBSERVATION
Discharge: HOME | End: 2018-07-06
Attending: EMERGENCY MEDICINE | Admitting: INTERNAL MEDICINE
Payer: COMMERCIAL

## 2018-07-04 DIAGNOSIS — R10.31 ABDOMINAL PAIN, RIGHT LOWER QUADRANT: ICD-10-CM

## 2018-07-04 DIAGNOSIS — K43.9 VENTRAL HERNIA WITHOUT OBSTRUCTION OR GANGRENE: ICD-10-CM

## 2018-07-04 DIAGNOSIS — R82.90 ABNORMAL URINALYSIS: ICD-10-CM

## 2018-07-04 DIAGNOSIS — R79.89 ELEVATED LACTIC ACID LEVEL: ICD-10-CM

## 2018-07-04 LAB
ALBUMIN SERPL-MCNC: 3.8 G/DL (ref 3.4–5)
ALBUMIN UR-MCNC: 10 MG/DL
ALP SERPL-CCNC: 65 U/L (ref 40–150)
ALT SERPL W P-5'-P-CCNC: 20 U/L (ref 0–50)
ANION GAP SERPL CALCULATED.3IONS-SCNC: 10 MMOL/L (ref 3–14)
APPEARANCE UR: CLEAR
AST SERPL W P-5'-P-CCNC: 22 U/L (ref 0–45)
BASOPHILS # BLD AUTO: 0 10E9/L (ref 0–0.2)
BASOPHILS NFR BLD AUTO: 0.3 %
BILIRUB SERPL-MCNC: 0.5 MG/DL (ref 0.2–1.3)
BILIRUB UR QL STRIP: NEGATIVE
BUN SERPL-MCNC: 20 MG/DL (ref 7–30)
CALCIUM SERPL-MCNC: 9 MG/DL (ref 8.5–10.1)
CHLORIDE SERPL-SCNC: 104 MMOL/L (ref 94–109)
CO2 SERPL-SCNC: 23 MMOL/L (ref 20–32)
COLOR UR AUTO: ABNORMAL
CREAT BLD-MCNC: 1.3 MG/DL (ref 0.52–1.04)
CREAT SERPL-MCNC: 1.28 MG/DL (ref 0.52–1.04)
DIFFERENTIAL METHOD BLD: ABNORMAL
EOSINOPHIL # BLD AUTO: 0.2 10E9/L (ref 0–0.7)
EOSINOPHIL NFR BLD AUTO: 1.7 %
ERYTHROCYTE [DISTWIDTH] IN BLOOD BY AUTOMATED COUNT: 15.4 % (ref 10–15)
GFR SERPL CREATININE-BSD FRML MDRD: 39 ML/MIN/1.7M2
GFR SERPL CREATININE-BSD FRML MDRD: 40 ML/MIN/1.7M2
GLUCOSE BLDC GLUCOMTR-MCNC: 111 MG/DL (ref 70–99)
GLUCOSE BLDC GLUCOMTR-MCNC: 140 MG/DL (ref 70–99)
GLUCOSE BLDC GLUCOMTR-MCNC: 149 MG/DL (ref 70–99)
GLUCOSE SERPL-MCNC: 212 MG/DL (ref 70–99)
GLUCOSE UR STRIP-MCNC: NEGATIVE MG/DL
HBA1C MFR BLD: 7.6 % (ref 0–5.6)
HCT VFR BLD AUTO: 41.7 % (ref 35–47)
HGB BLD-MCNC: 13.4 G/DL (ref 11.7–15.7)
HGB UR QL STRIP: ABNORMAL
IMM GRANULOCYTES # BLD: 0.1 10E9/L (ref 0–0.4)
IMM GRANULOCYTES NFR BLD: 0.4 %
INTERPRETATION ECG - MUSE: NORMAL
KETONES UR STRIP-MCNC: NEGATIVE MG/DL
LACTATE BLD-SCNC: 2.6 MMOL/L (ref 0.7–2)
LACTATE BLD-SCNC: 2.7 MMOL/L (ref 0.7–2)
LACTATE BLD-SCNC: 2.7 MMOL/L (ref 0.7–2)
LEUKOCYTE ESTERASE UR QL STRIP: ABNORMAL
LIPASE SERPL-CCNC: 218 U/L (ref 73–393)
LYMPHOCYTES # BLD AUTO: 2.6 10E9/L (ref 0.8–5.3)
LYMPHOCYTES NFR BLD AUTO: 22.2 %
MCH RBC QN AUTO: 27.3 PG (ref 26.5–33)
MCHC RBC AUTO-ENTMCNC: 32.1 G/DL (ref 31.5–36.5)
MCV RBC AUTO: 85 FL (ref 78–100)
MONOCYTES # BLD AUTO: 0.7 10E9/L (ref 0–1.3)
MONOCYTES NFR BLD AUTO: 5.7 %
MUCOUS THREADS #/AREA URNS LPF: PRESENT /LPF
NEUTROPHILS # BLD AUTO: 8 10E9/L (ref 1.6–8.3)
NEUTROPHILS NFR BLD AUTO: 69.7 %
NITRATE UR QL: NEGATIVE
NRBC # BLD AUTO: 0 10*3/UL
NRBC BLD AUTO-RTO: 0 /100
PH UR STRIP: 5.5 PH (ref 5–7)
PLATELET # BLD AUTO: 294 10E9/L (ref 150–450)
POTASSIUM SERPL-SCNC: 4.9 MMOL/L (ref 3.4–5.3)
PROT SERPL-MCNC: 8.3 G/DL (ref 6.8–8.8)
RBC # BLD AUTO: 4.91 10E12/L (ref 3.8–5.2)
RBC #/AREA URNS AUTO: 43 /HPF (ref 0–2)
SODIUM SERPL-SCNC: 137 MMOL/L (ref 133–144)
SOURCE: ABNORMAL
SP GR UR STRIP: 1.02 (ref 1–1.03)
SQUAMOUS #/AREA URNS AUTO: 2 /HPF (ref 0–1)
TRANS CELLS #/AREA URNS HPF: <1 /HPF (ref 0–1)
UROBILINOGEN UR STRIP-MCNC: NORMAL MG/DL (ref 0–2)
WBC # BLD AUTO: 11.5 10E9/L (ref 4–11)
WBC #/AREA URNS AUTO: 10 /HPF (ref 0–5)

## 2018-07-04 PROCEDURE — 96376 TX/PRO/DX INJ SAME DRUG ADON: CPT

## 2018-07-04 PROCEDURE — 99223 1ST HOSP IP/OBS HIGH 75: CPT | Mod: AI | Performed by: INTERNAL MEDICINE

## 2018-07-04 PROCEDURE — 36415 COLL VENOUS BLD VENIPUNCTURE: CPT | Performed by: PHYSICIAN ASSISTANT

## 2018-07-04 PROCEDURE — 81001 URINALYSIS AUTO W/SCOPE: CPT | Performed by: EMERGENCY MEDICINE

## 2018-07-04 PROCEDURE — 12000000 ZZH R&B MED SURG/OB

## 2018-07-04 PROCEDURE — 96375 TX/PRO/DX INJ NEW DRUG ADDON: CPT

## 2018-07-04 PROCEDURE — 25000128 H RX IP 250 OP 636: Performed by: EMERGENCY MEDICINE

## 2018-07-04 PROCEDURE — 25000128 H RX IP 250 OP 636: Performed by: PHYSICIAN ASSISTANT

## 2018-07-04 PROCEDURE — 25000128 H RX IP 250 OP 636

## 2018-07-04 PROCEDURE — 25000128 H RX IP 250 OP 636: Performed by: INTERNAL MEDICINE

## 2018-07-04 PROCEDURE — 83036 HEMOGLOBIN GLYCOSYLATED A1C: CPT | Performed by: EMERGENCY MEDICINE

## 2018-07-04 PROCEDURE — 99285 EMERGENCY DEPT VISIT HI MDM: CPT | Mod: 25

## 2018-07-04 PROCEDURE — 83690 ASSAY OF LIPASE: CPT | Performed by: EMERGENCY MEDICINE

## 2018-07-04 PROCEDURE — 93005 ELECTROCARDIOGRAM TRACING: CPT

## 2018-07-04 PROCEDURE — 80053 COMPREHEN METABOLIC PANEL: CPT | Performed by: EMERGENCY MEDICINE

## 2018-07-04 PROCEDURE — 74177 CT ABD & PELVIS W/CONTRAST: CPT

## 2018-07-04 PROCEDURE — 83605 ASSAY OF LACTIC ACID: CPT | Performed by: PHYSICIAN ASSISTANT

## 2018-07-04 PROCEDURE — 96361 HYDRATE IV INFUSION ADD-ON: CPT

## 2018-07-04 PROCEDURE — 25000132 ZZH RX MED GY IP 250 OP 250 PS 637: Performed by: PHYSICIAN ASSISTANT

## 2018-07-04 PROCEDURE — 00000146 ZZHCL STATISTIC GLUCOSE BY METER IP

## 2018-07-04 PROCEDURE — 87086 URINE CULTURE/COLONY COUNT: CPT | Performed by: EMERGENCY MEDICINE

## 2018-07-04 PROCEDURE — 82565 ASSAY OF CREATININE: CPT

## 2018-07-04 PROCEDURE — 96374 THER/PROPH/DIAG INJ IV PUSH: CPT | Mod: 59

## 2018-07-04 PROCEDURE — 83605 ASSAY OF LACTIC ACID: CPT | Performed by: EMERGENCY MEDICINE

## 2018-07-04 PROCEDURE — 25000125 ZZHC RX 250: Performed by: EMERGENCY MEDICINE

## 2018-07-04 PROCEDURE — 25000131 ZZH RX MED GY IP 250 OP 636 PS 637: Performed by: PHYSICIAN ASSISTANT

## 2018-07-04 PROCEDURE — 99204 OFFICE O/P NEW MOD 45 MIN: CPT | Performed by: SURGERY

## 2018-07-04 PROCEDURE — 85025 COMPLETE CBC W/AUTO DIFF WBC: CPT | Performed by: EMERGENCY MEDICINE

## 2018-07-04 RX ORDER — METOPROLOL SUCCINATE 100 MG/1
100 TABLET, EXTENDED RELEASE ORAL DAILY
Status: DISCONTINUED | OUTPATIENT
Start: 2018-07-04 | End: 2018-07-06 | Stop reason: HOSPADM

## 2018-07-04 RX ORDER — ACETAMINOPHEN 325 MG/1
650 TABLET ORAL EVERY 4 HOURS PRN
Status: DISCONTINUED | OUTPATIENT
Start: 2018-07-04 | End: 2018-07-06 | Stop reason: HOSPADM

## 2018-07-04 RX ORDER — PROCHLORPERAZINE MALEATE 5 MG
5 TABLET ORAL EVERY 6 HOURS PRN
Status: DISCONTINUED | OUTPATIENT
Start: 2018-07-04 | End: 2018-07-06 | Stop reason: HOSPADM

## 2018-07-04 RX ORDER — HYDROMORPHONE HYDROCHLORIDE 1 MG/ML
.3-.5 INJECTION, SOLUTION INTRAMUSCULAR; INTRAVENOUS; SUBCUTANEOUS
Status: DISCONTINUED | OUTPATIENT
Start: 2018-07-04 | End: 2018-07-06 | Stop reason: HOSPADM

## 2018-07-04 RX ORDER — MORPHINE SULFATE 4 MG/ML
INJECTION, SOLUTION INTRAMUSCULAR; INTRAVENOUS
Status: COMPLETED
Start: 2018-07-04 | End: 2018-07-04

## 2018-07-04 RX ORDER — ONDANSETRON 2 MG/ML
4 INJECTION INTRAMUSCULAR; INTRAVENOUS ONCE
Status: COMPLETED | OUTPATIENT
Start: 2018-07-04 | End: 2018-07-04

## 2018-07-04 RX ORDER — ALBUTEROL SULFATE 90 UG/1
2 AEROSOL, METERED RESPIRATORY (INHALATION) EVERY 6 HOURS PRN
Status: DISCONTINUED | OUTPATIENT
Start: 2018-07-04 | End: 2018-07-06 | Stop reason: HOSPADM

## 2018-07-04 RX ORDER — PROCHLORPERAZINE 25 MG
12.5 SUPPOSITORY, RECTAL RECTAL EVERY 12 HOURS PRN
Status: DISCONTINUED | OUTPATIENT
Start: 2018-07-04 | End: 2018-07-06 | Stop reason: HOSPADM

## 2018-07-04 RX ORDER — SODIUM CHLORIDE 9 MG/ML
INJECTION, SOLUTION INTRAVENOUS CONTINUOUS
Status: DISCONTINUED | OUTPATIENT
Start: 2018-07-04 | End: 2018-07-05

## 2018-07-04 RX ORDER — AMOXICILLIN 250 MG
2 CAPSULE ORAL 2 TIMES DAILY PRN
Status: DISCONTINUED | OUTPATIENT
Start: 2018-07-04 | End: 2018-07-06 | Stop reason: HOSPADM

## 2018-07-04 RX ORDER — AMOXICILLIN 250 MG
1 CAPSULE ORAL 2 TIMES DAILY PRN
Status: DISCONTINUED | OUTPATIENT
Start: 2018-07-04 | End: 2018-07-06 | Stop reason: HOSPADM

## 2018-07-04 RX ORDER — DEXTROSE MONOHYDRATE 25 G/50ML
25-50 INJECTION, SOLUTION INTRAVENOUS
Status: DISCONTINUED | OUTPATIENT
Start: 2018-07-04 | End: 2018-07-06 | Stop reason: HOSPADM

## 2018-07-04 RX ORDER — IBANDRONATE SODIUM 150 MG/1
150 TABLET, FILM COATED ORAL
COMMUNITY
End: 2019-02-06

## 2018-07-04 RX ORDER — NICOTINE POLACRILEX 4 MG
15-30 LOZENGE BUCCAL
Status: DISCONTINUED | OUTPATIENT
Start: 2018-07-04 | End: 2018-07-06 | Stop reason: HOSPADM

## 2018-07-04 RX ORDER — ACETAMINOPHEN 650 MG/1
650 SUPPOSITORY RECTAL EVERY 4 HOURS PRN
Status: DISCONTINUED | OUTPATIENT
Start: 2018-07-04 | End: 2018-07-06 | Stop reason: HOSPADM

## 2018-07-04 RX ORDER — MORPHINE SULFATE 4 MG/ML
4 INJECTION, SOLUTION INTRAMUSCULAR; INTRAVENOUS ONCE
Status: COMPLETED | OUTPATIENT
Start: 2018-07-04 | End: 2018-07-04

## 2018-07-04 RX ORDER — NALOXONE HYDROCHLORIDE 0.4 MG/ML
.1-.4 INJECTION, SOLUTION INTRAMUSCULAR; INTRAVENOUS; SUBCUTANEOUS
Status: DISCONTINUED | OUTPATIENT
Start: 2018-07-04 | End: 2018-07-06 | Stop reason: HOSPADM

## 2018-07-04 RX ORDER — HYDRALAZINE HYDROCHLORIDE 20 MG/ML
10 INJECTION INTRAMUSCULAR; INTRAVENOUS EVERY 4 HOURS PRN
Status: DISCONTINUED | OUTPATIENT
Start: 2018-07-04 | End: 2018-07-06 | Stop reason: HOSPADM

## 2018-07-04 RX ORDER — ONDANSETRON 4 MG/1
4 TABLET, ORALLY DISINTEGRATING ORAL EVERY 6 HOURS PRN
Status: DISCONTINUED | OUTPATIENT
Start: 2018-07-04 | End: 2018-07-06 | Stop reason: HOSPADM

## 2018-07-04 RX ORDER — LIDOCAINE 40 MG/G
CREAM TOPICAL
Status: DISCONTINUED | OUTPATIENT
Start: 2018-07-04 | End: 2018-07-06 | Stop reason: HOSPADM

## 2018-07-04 RX ORDER — ONDANSETRON 2 MG/ML
4 INJECTION INTRAMUSCULAR; INTRAVENOUS EVERY 6 HOURS PRN
Status: DISCONTINUED | OUTPATIENT
Start: 2018-07-04 | End: 2018-07-06 | Stop reason: HOSPADM

## 2018-07-04 RX ORDER — ONDANSETRON 2 MG/ML
INJECTION INTRAMUSCULAR; INTRAVENOUS
Status: COMPLETED
Start: 2018-07-04 | End: 2018-07-04

## 2018-07-04 RX ORDER — ATORVASTATIN CALCIUM 10 MG/1
20 TABLET, FILM COATED ORAL AT BEDTIME
Status: DISCONTINUED | OUTPATIENT
Start: 2018-07-04 | End: 2018-07-06 | Stop reason: HOSPADM

## 2018-07-04 RX ORDER — IOPAMIDOL 755 MG/ML
135 INJECTION, SOLUTION INTRAVASCULAR ONCE
Status: COMPLETED | OUTPATIENT
Start: 2018-07-04 | End: 2018-07-04

## 2018-07-04 RX ORDER — DULOXETIN HYDROCHLORIDE 60 MG/1
60 CAPSULE, DELAYED RELEASE ORAL DAILY
Status: DISCONTINUED | OUTPATIENT
Start: 2018-07-04 | End: 2018-07-06 | Stop reason: HOSPADM

## 2018-07-04 RX ORDER — MORPHINE SULFATE 2 MG/ML
2 INJECTION, SOLUTION INTRAMUSCULAR; INTRAVENOUS EVERY 30 MIN PRN
Status: DISCONTINUED | OUTPATIENT
Start: 2018-07-04 | End: 2018-07-04

## 2018-07-04 RX ORDER — LISINOPRIL 10 MG/1
10 TABLET ORAL DAILY
Status: DISCONTINUED | OUTPATIENT
Start: 2018-07-04 | End: 2018-07-04

## 2018-07-04 RX ORDER — POLYETHYLENE GLYCOL 3350 17 G/17G
17 POWDER, FOR SOLUTION ORAL DAILY PRN
Status: DISCONTINUED | OUTPATIENT
Start: 2018-07-04 | End: 2018-07-06 | Stop reason: HOSPADM

## 2018-07-04 RX ADMIN — PROCHLORPERAZINE EDISYLATE 5 MG: 5 INJECTION INTRAMUSCULAR; INTRAVENOUS at 12:00

## 2018-07-04 RX ADMIN — Medication 0.3 MG: at 11:54

## 2018-07-04 RX ADMIN — SODIUM CHLORIDE 1000 ML: 9 INJECTION, SOLUTION INTRAVENOUS at 12:46

## 2018-07-04 RX ADMIN — MORPHINE SULFATE 4 MG: 4 INJECTION, SOLUTION INTRAMUSCULAR; INTRAVENOUS at 07:08

## 2018-07-04 RX ADMIN — Medication 0.5 MG: at 12:55

## 2018-07-04 RX ADMIN — INSULIN ASPART 1 UNITS: 100 INJECTION, SOLUTION INTRAVENOUS; SUBCUTANEOUS at 13:36

## 2018-07-04 RX ADMIN — SODIUM CHLORIDE, PRESERVATIVE FREE 80 ML: 5 INJECTION INTRAVENOUS at 08:43

## 2018-07-04 RX ADMIN — INSULIN ASPART 1 UNITS: 100 INJECTION, SOLUTION INTRAVENOUS; SUBCUTANEOUS at 16:15

## 2018-07-04 RX ADMIN — ONDANSETRON 4 MG: 2 INJECTION INTRAMUSCULAR; INTRAVENOUS at 07:05

## 2018-07-04 RX ADMIN — HYDRALAZINE HYDROCHLORIDE 10 MG: 20 INJECTION INTRAMUSCULAR; INTRAVENOUS at 13:35

## 2018-07-04 RX ADMIN — SODIUM CHLORIDE, POTASSIUM CHLORIDE, SODIUM LACTATE AND CALCIUM CHLORIDE 500 ML: 600; 310; 30; 20 INJECTION, SOLUTION INTRAVENOUS at 07:32

## 2018-07-04 RX ADMIN — ATORVASTATIN CALCIUM 20 MG: 10 TABLET, FILM COATED ORAL at 21:58

## 2018-07-04 RX ADMIN — FLUTICASONE PROPIONATE AND SALMETEROL 1 PUFF: 50; 100 POWDER RESPIRATORY (INHALATION) at 14:44

## 2018-07-04 RX ADMIN — METOPROLOL SUCCINATE 100 MG: 100 TABLET, EXTENDED RELEASE ORAL at 14:42

## 2018-07-04 RX ADMIN — MORPHINE SULFATE 2 MG: 2 INJECTION, SOLUTION INTRAMUSCULAR; INTRAVENOUS at 08:54

## 2018-07-04 RX ADMIN — MORPHINE SULFATE 2 MG: 2 INJECTION, SOLUTION INTRAMUSCULAR; INTRAVENOUS at 09:25

## 2018-07-04 RX ADMIN — SODIUM CHLORIDE: 9 INJECTION, SOLUTION INTRAVENOUS at 11:50

## 2018-07-04 RX ADMIN — IOPAMIDOL 135 ML: 755 INJECTION, SOLUTION INTRAVENOUS at 08:42

## 2018-07-04 RX ADMIN — SODIUM CHLORIDE: 9 INJECTION, SOLUTION INTRAVENOUS at 21:59

## 2018-07-04 RX ADMIN — DULOXETINE HYDROCHLORIDE 60 MG: 60 CAPSULE, DELAYED RELEASE ORAL at 14:42

## 2018-07-04 RX ADMIN — MORPHINE SULFATE 4 MG: 4 INJECTION INTRAVENOUS at 07:08

## 2018-07-04 RX ADMIN — MORPHINE SULFATE 4 MG: 4 INJECTION INTRAVENOUS at 10:47

## 2018-07-04 ASSESSMENT — ENCOUNTER SYMPTOMS
DYSURIA: 0
DIAPHORESIS: 1
ABDOMINAL PAIN: 1
BLOOD IN STOOL: 0
SHORTNESS OF BREATH: 0

## 2018-07-04 NOTE — PLAN OF CARE
Problem: Patient Care Overview  Goal: Plan of Care/Patient Progress Review  Pt a/o x 4. VSS. Pain well controlled this shift. Pt has umbilical hernia, denies tenderness when touched. Pt NPO, blood sugar checks every 4 hours. Denies nausea this shift. Pt has been sleeping most of shift. UP with SBA.

## 2018-07-04 NOTE — IP AVS SNAPSHOT
[facility] [address] [facility]    [address] [place] [address]    Phone:  [phone]                                       After Visit Summary   7/4/2018    Moira Andre    MRN: [id number]           After Visit Summary Signature Page     I have received my discharge instructions, and my questions have been answered. I have discussed any challenges I see with this plan with the nurse or doctor.    ..........................................................................................................................................  Patient/Patient Representative Signature      ..........................................................................................................................................  Patient Representative Print Name and Relationship to Patient    ..................................................               ................................................  Date                                            Time    ..........................................................................................................................................  Reviewed by Signature/Title    ...................................................              ..............................................  Date                                                            Time

## 2018-07-04 NOTE — IP AVS SNAPSHOT
MRN:[id number]                      After Visit Summary   7/4/2018    Moira Andre    MRN: [id number]           Thank you!     Thank you for choosing [facility] for your care. Our goal is always to provide you with excellent care. Hearing back from our patients is one way we can continue to improve our services. Please take a few minutes to complete the written survey that you may receive in the mail after you visit with us. Thank you!        Patient Information     Date Of Birth          9/24/1933        Designated Caregiver       Most Recent Value    Caregiver    Will someone help with your care after discharge? yes    Name of designated caregiver sharronnds and family    Phone number of caregiver na    Caregiver address na      About your hospital stay     You were admitted on:  July 4, 2018 You last received care in the:  [facility] [address] [facility]    You were discharged on:  July 6, 2018        Reason for your hospital stay       Symptomatic ventral hernia                  Who to Call     For medical emergencies, please call 911.  For non-urgent questions about your medical care, please call your primary care provider or clinic, [phone]  For questions related to your surgery, please call your surgery clinic        Attending Provider     Provider Colleen Johnson MD Emergency Medicine    [facility], Tommy Zelaya MD Internal Medicine       Primary Care Provider Office Phone # Fax #    Maryan BRIGIDA Churchill [phone]      After Care Instructions     Activity       Your activity upon discharge: as tolerated            Diet       Follow this diet upon discharge: 2000mg per day low sodium diet and diabetic diet. Low fat diet            Discharge Instructions       See follow up instructions. Resume plavix today at home.                  Follow-up Appointments     Follow-up and recommended labs and tests        1. See primary care provider or  partner within the week and again for preoperative evaluation if decide in future to proceed with ventral hernia repair surgery.  Please have Dr. Churchill clear you for surgery and have your clinic contact Dr. Márquez's office to schedule your surgery.     2. See surgeon as discussed in several weeks to follow up ventral hernia  Humberto Márquez M.D.  [facility]  [phone]                  Your next 10 appointments already scheduled     Jul 09, 2018  1:30 PM CDT   [facility]an [facility] MD   [facility]    [address] [id number] [phone]           Bring a current list of meds and any records pertaining to this visit. For Physicals, please bring immunization records and any forms needing to be filled out. Please arrive 10 minutes early to complete paperwork.            Sep 17, 2018 10:00 AM CDT   Ech Complete with [id number]   [facility] CV Echocardiography ([facility])    [address]  [address]  [facility] [id number] [phone]           1. Please bring or wear a comfortable two-piece outfit. 2. You may eat, drink and take your normal medicines. 3. For any questions that cannot be answered, please contact the ordering physician            Sep 17, 2018  1:15 PM CDT   Return Visit with Jozef Sinha MD   [facility] ([facility])    [address]  [place] [id number] [phone] OPT 2              Further instructions from your care team       1. Obtain these lab tests in clinic with primary care provider next week: BMP kidney test and CBC with platelet count.     Pending Results     No orders found from 7/2/2018 to 7/5/2018.            Statement of Approval     Ordered          07/06/18 1052  I have reviewed and agree with all the recommendations and orders detailed in this document.  EFFECTIVE  "NOW     Approved and electronically signed by:  Tommy Hernandez MD             Admission Information     Date & Time Provider Department Dept. Phone    2018 Tommy Hernandez MD [place] [address] [facility] [phone]      Your Vitals Were     Blood Pressure Pulse Temperature Respirations Height Weight    169/80 62 98.7  F (37.1  C) (Oral) 16 1.702 m (5' 7\") 127 kg (280 lb)    Pulse Oximetry BMI (Body Mass Index)                92% 43.85 kg/m2          [employer] Information     [employer] lets you send messages to your doctor, view your test results, renew your prescriptions, schedule appointments and more. To sign up, go to [email or web address] . Click on \"Log in\" on the left side of the screen, which will take you to the Welcome page. Then click on \"Sign up Now\" on the right side of the page.     You will be asked to enter the access code listed below, as well as some personal information. Please follow the directions to create your username and password.     Your access code is: [email or web address]  Expires: 10/3/2018  9:43 AM     Your access code will  in 90 days. If you need help or a new code, please call your [facility] clinic or [phone].        Care EveryWhere ID     This is your Care EveryWhere ID. This could be used by other organizations to access your [facility] medical records  [id number]        Equal Access to Services     DEL AGUILAR AH: Hadii gissel sono Somakayla, waaxda luqadaha, qaybta kaalmada gisella, andreina shaw . So [facility] [phone].    ATENCIÓN: Si habla español, tiene a gold disposición servicios gratuitos de asistencia lingüística. Llame al [phone].    We comply with applicable federal civil rights laws and [place] laws. We do not discriminate on the basis of race, color, national origin, age, disability, sex, sexual orientation, or gender identity.               Review of your medicines      CONTINUE these medicines " which have NOT CHANGED        Dose / Directions    acetaminophen 325 MG tablet   Commonly known as:  TYLENOL        Dose:  325-650 mg   Take 325-650 mg by mouth every 6 hours as needed for mild pain   Refills:  0       ADVAIR DISKUS 100-50 MCG/DOSE diskus inhaler   Used for:  Intermittent asthma, uncomplicated   Generic drug:  fluticasone-salmeterol        INHALE 1 PUFF EVERY 12 HOURS   Quantity:  3 Inhaler   Refills:  3       albuterol 108 (90 Base) MCG/ACT Inhaler   Commonly known as:  PROAIR HFA/PROVENTIL HFA/VENTOLIN HFA   Used for:  Intermittent asthma, uncomplicated        Dose:  2 puff   Inhale 2 puffs into the lungs every 6 hours as needed for shortness of breath / dyspnea   Quantity:  1 Inhaler   Refills:  3       atorvastatin 20 MG tablet   Commonly known as:  LIPITOR   Used for:  Hyperlipidemia LDL goal <70        Dose:  20 mg   Take 1 tablet (20 mg) by mouth daily   Quantity:  90 tablet   Refills:  3       * blood glucose monitoring meter device kit   Commonly known as:  no brand specified   Used for:  Type 2 diabetes mellitus with diabetic nephropathy (H)        Use to test blood sugar 1-2 times daily or as directed.   Quantity:  1 kit   Refills:  0       * TRUE METRIX AIR GLUCOSE METER w/Device Kit   Used for:  Type 2 diabetes mellitus with diabetic nephropathy (H)        USE AS DIRECTED   Quantity:  1 kit   Refills:  0       blood glucose monitoring test strip   Commonly known as:  TRUE METRIX BLOOD GLUCOSE TEST   Used for:  Type 2 diabetes mellitus with diabetic nephropathy, without long-term current use of insulin (H)        Dose:  1 strip   1 strip by In Vitro route daily   Quantity:  200 strip   Refills:  3       clopidogrel 75 MG tablet   Commonly known as:  PLAVIX   Used for:  Coronary artery disease involving native coronary artery of native heart without angina pectoris        Dose:  75 mg   Take 1 tablet (75 mg) by mouth daily   Quantity:  90 tablet   Refills:  3       DULoxetine 60 MG EC  capsule   Commonly known as:  CYMBALTA   Used for:  Moderate major depression (H)        TAKE 1 CAPSULE EVERY DAY  (DOSE  INCREASE)   Quantity:  90 capsule   Refills:  1       FLONASE 50 MCG/ACT spray   Generic drug:  fluticasone        Dose:  1 spray   Spray 1 spray into both nostrils as needed   Refills:  0       HAIR SKIN AND NAILS FORMULA PO        Take by mouth daily   Refills:  0       HYDROcodone-acetaminophen 5-325 MG per tablet   Commonly known as:  NORCO   Used for:  Multiple joint pain        Dose:  1 tablet   Take 1 tablet by mouth every 8 hours as needed for moderate to severe pain   Quantity:  90 tablet   Refills:  0       IBANdronate 150 MG tablet   Commonly known as:  BONIVA        Dose:  150 mg   Take 150 mg by mouth every 30 days Patient takes on the first day of each month.   Refills:  0       ICY HOT EX        Apply to affected area every morning   Refills:  0       lisinopril 10 MG tablet   Commonly known as:  PRINIVIL/ZESTRIL   Used for:  Essential hypertension        Dose:  10 mg   Take 1 tablet (10 mg) by mouth daily   Quantity:  90 tablet   Refills:  1       metFORMIN 500 MG 24 hr tablet   Commonly known as:  GLUCOPHAGE-XR   Used for:  Type 2 diabetes mellitus with diabetic nephropathy, without long-term current use of insulin (H)        Dose:  1000 mg   Take 2 tablets (1,000 mg) by mouth daily (with dinner)   Quantity:  180 tablet   Refills:  1       metoprolol succinate 100 MG 24 hr tablet   Commonly known as:  TOPROL-XL   Used for:  Essential hypertension        TAKE 1 TABLET EVERY DAY  (DUE  FOR  OFFICE  VISIT, PLEASE MAKE APPT.)   Quantity:  90 tablet   Refills:  3       neomycin-polymyxin-hydrocortisone otic solution   Commonly known as:  CORTISPORIN   Used for:  Left ear pain        Dose:  4 drop   Place 4 drops into both ears 4 times daily   Quantity:  10 mL   Refills:  0       NITROQUICK SL        Dose:  0.4 mg   Place 0.4 mg under the tongue every 5 minutes as needed   Refills:  0        nystatin-triamcinolone cream   Commonly known as:  MYCOLOG II   Used for:  Tinea of the body        APPLY TOPICALLY TWICE DAILY   Quantity:  60 g   Refills:  1       order for DME        DREAMSTATION 5-15 CM/H20 NASAL WISP FABRIC   Refills:  0       TRUEPLUS LANCETS 28G Misc   Used for:  Type 2 diabetes mellitus with diabetic nephropathy (H)        Dose:  1 each   1 each 2 times daily as needed   Quantity:  100 each   Refills:  3       vitamin D 2000 units Caps        Take by mouth daily   Refills:  0       * Notice:  This list has 2 medication(s) that are the same as other medications prescribed for you. Read the directions carefully, and ask your doctor or other care provider to review them with you.             Protect others around you: Learn how to safely use, store and throw away your medicines at [email or web address].             Medication List: This is a list of all your medications and when to take them. Check marks below indicate your daily home schedule. Keep this list as a reference.      Medications           Morning Afternoon Evening Bedtime As Needed    acetaminophen 325 MG tablet   Commonly known as:  TYLENOL   Take 325-650 mg by mouth every 6 hours as needed for mild pain                                ADVAIR DISKUS 100-50 MCG/DOSE diskus inhaler   INHALE 1 PUFF EVERY 12 HOURS   Last time this was given:  1 puff on 7/6/2018  8:50 AM   Generic drug:  fluticasone-salmeterol                                albuterol 108 (90 Base) MCG/ACT Inhaler   Commonly known as:  PROAIR HFA/PROVENTIL HFA/VENTOLIN HFA   Inhale 2 puffs into the lungs every 6 hours as needed for shortness of breath / dyspnea                                atorvastatin 20 MG tablet   Commonly known as:  LIPITOR   Take 1 tablet (20 mg) by mouth daily   Last time this was given:  20 mg on 7/5/2018  9:26 PM                                * blood glucose monitoring meter device kit   Commonly known as:  no brand specified   Use to  test blood sugar 1-2 times daily or as directed.                                * TRUE METRIX AIR GLUCOSE METER w/Device Kit   USE AS DIRECTED                                blood glucose monitoring test strip   Commonly known as:  TRUE METRIX BLOOD GLUCOSE TEST   1 strip by In Vitro route daily                                clopidogrel 75 MG tablet   Commonly known as:  PLAVIX   Take 1 tablet (75 mg) by mouth daily                                DULoxetine 60 MG EC capsule   Commonly known as:  CYMBALTA   TAKE 1 CAPSULE EVERY DAY  (DOSE  INCREASE)   Last time this was given:  60 mg on 7/6/2018  8:50 AM                                FLONASE 50 MCG/ACT spray   Spray 1 spray into both nostrils as needed   Generic drug:  fluticasone                                HAIR SKIN AND NAILS FORMULA PO   Take by mouth daily                                HYDROcodone-acetaminophen 5-325 MG per tablet   Commonly known as:  NORCO   Take 1 tablet by mouth every 8 hours as needed for moderate to severe pain                                IBANdronate 150 MG tablet   Commonly known as:  BONIVA   Take 150 mg by mouth every 30 days Patient takes on the first day of each month.                                ICY HOT EX   Apply to affected area every morning                                lisinopril 10 MG tablet   Commonly known as:  PRINIVIL/ZESTRIL   Take 1 tablet (10 mg) by mouth daily                                metFORMIN 500 MG 24 hr tablet   Commonly known as:  GLUCOPHAGE-XR   Take 2 tablets (1,000 mg) by mouth daily (with dinner)                                metoprolol succinate 100 MG 24 hr tablet   Commonly known as:  TOPROL-XL   TAKE 1 TABLET EVERY DAY  (DUE  FOR  OFFICE  VISIT, PLEASE MAKE APPT.)   Last time this was given:  100 mg on 7/6/2018  8:49 AM                                neomycin-polymyxin-hydrocortisone otic solution   Commonly known as:  CORTISPORIN   Place 4 drops into both ears 4 times daily                                 NITROQUICK SL   Place 0.4 mg under the tongue every 5 minutes as needed                                nystatin-triamcinolone cream   Commonly known as:  MYCOLOG II   APPLY TOPICALLY TWICE DAILY                                order for DME   DREAMSTATION 5-15 CM/H20 NASAL WISP FABRIC                                TRUEPLUS LANCETS 28G Misc   1 each 2 times daily as needed                                vitamin D 2000 units Caps   Take by mouth daily                                * Notice:  This list has 2 medication(s) that are the same as other medications prescribed for you. Read the directions carefully, and ask your doctor or other care provider to review them with you.

## 2018-07-04 NOTE — PHARMACY-ADMISSION MEDICATION HISTORY
Admission medication history interview status for the 7/4/2018  admission is complete. See EPIC admission navigator for prior to admission medications     Medication history source reliability:Good    Actions taken by pharmacist (provider contacted, etc):None     Additional medication history information not noted on PTA med list :None    Medication reconciliation/reorder completed by provider prior to medication history? No    Time spent in this activity: 15 minutes    Prior to Admission medications    Medication Sig Last Dose Taking? Auth Provider   acetaminophen (TYLENOL) 325 MG tablet Take 325-650 mg by mouth every 6 hours as needed for mild pain  at prn Yes Reported, Patient   ADVAIR DISKUS 100-50 MCG/DOSE diskus inhaler INHALE 1 PUFF EVERY 12 HOURS 7/3/2018 at x2 Yes Maryan Churchill MD   albuterol (PROAIR HFA/PROVENTIL HFA/VENTOLIN HFA) 108 (90 BASE) MCG/ACT Inhaler Inhale 2 puffs into the lungs every 6 hours as needed for shortness of breath / dyspnea  at prn Yes Maryan Churchill MD   atorvastatin (LIPITOR) 20 MG tablet Take 1 tablet (20 mg) by mouth daily 7/3/2018 at Unknown time Yes Maryan Churchill MD   Cholecalciferol (VITAMIN D) 2000 UNITS CAPS Take by mouth daily 7/3/2018 at Unknown time Yes Reported, Patient   clopidogrel (PLAVIX) 75 MG tablet Take 1 tablet (75 mg) by mouth daily 7/3/2018 at Unknown time Yes Maryan Churchill MD   DULoxetine (CYMBALTA) 60 MG EC capsule TAKE 1 CAPSULE EVERY DAY  (DOSE  INCREASE) 7/3/2018 at Unknown time Yes Maryan Churchill MD   fluticasone (FLONASE) 50 MCG/ACT nasal spray Spray 1 spray into both nostrils as needed   at prn Yes Reported, Patient   HYDROcodone-acetaminophen (NORCO) 5-325 MG per tablet Take 1 tablet by mouth every 8 hours as needed for moderate to severe pain  at prn Yes Maryan Churchill MD   IBANdronate (BONIVA) 150 MG tablet Take 150 mg by mouth every 30 days Patient takes on the first day of each month. 7/1/2018 Yes Unknown, Entered By History    lisinopril (PRINIVIL/ZESTRIL) 10 MG tablet Take 1 tablet (10 mg) by mouth daily 7/3/2018 at Unknown time Yes Maryan Churchill MD   Menthol, Topical Analgesic, (ICY HOT EX) Apply to affected area every morning 7/3/2018 at Unknown time Yes Unknown, Entered By History   metoprolol succinate (TOPROL-XL) 100 MG 24 hr tablet TAKE 1 TABLET EVERY DAY  (DUE  FOR  OFFICE  VISIT, PLEASE MAKE APPT.) 7/3/2018 at Unknown time Yes Maryan Churchill MD   Multiple Vitamins-Minerals (HAIR SKIN AND NAILS FORMULA PO) Take by mouth daily 7/3/2018 at Unknown time Yes Reported, Patient   neomycin-polymyxin-hydrocortisone (CORTISPORIN) otic solution Place 4 drops into both ears 4 times daily 7/3/2018 at x4 Yes Maryan Churchill MD   Nitroglycerin (NITROQUICK SL) Place 0.4 mg under the tongue every 5 minutes as needed   at prn Yes Reported, Patient   nystatin-triamcinolone (MYCOLOG II) cream APPLY TOPICALLY TWICE DAILY 7/3/2018 at Unknown time Yes Maryan Churchill MD   blood glucose monitoring (NO BRAND SPECIFIED) meter device kit Use to test blood sugar 1-2 times daily or as directed.   Maryan Churchill MD   blood glucose monitoring (TRUE METRIX BLOOD GLUCOSE TEST) test strip 1 strip by In Vitro route daily   Maryan Churchill MD   Blood Glucose Monitoring Suppl (TRUE METRIX AIR GLUCOSE METER) W/DEVICE KIT USE AS DIRECTED   Maryan Churchill MD   metFORMIN (GLUCOPHAGE-XR) 500 MG 24 hr tablet Take 2 tablets (1,000 mg) by mouth daily (with dinner) 7/2/2018  Maryan Churchill MD   order for DME DREAMSTATION  5-15 CM/H20  NASAL WISP FABRIC   Reported, Patient   TRUEPLUS LANCETS 28G MISC 1 each 2 times daily as needed   Maryan Churchill MD

## 2018-07-04 NOTE — PROGRESS NOTES
RECEIVING UNIT ED HANDOFF REVIEW    ED Nurse Handoff Report was reviewed by: Ashia Sheridan on July 4, 2018 at 10:32 AM

## 2018-07-04 NOTE — PLAN OF CARE
"Problem: Patient Care Overview  Goal: Plan of Care/Patient Progress Review  Outcome: No Change  Continues to report pain to RLQ over hernia site.  Non tender to other quadrants.  Dilaudid given x 2 for pain rating of 8-9 without relief.  Currently sleeping.  Continuous pulse ox on; sats upper 90's.  HR 60's regular.  BP elevated 180's/80's; IV hydralazine given without change; will recheck and update PA.  Lactic 2.6; IV bolus given; recheck at 3pm.  .  Compazine given for nausea; has refused her 2 oral medications; feels she will not keep them down; will offer zofran.  Voided on arrival to room; bladder scan at 1300 negative; to do PVR on next void.  Seen by surgery; conservative treatment for now.    Addendum:  At 3pm, patient reports no nausea and pain much improved at a rating of \"4\".  Up to commode with SBA, took oral meds, and BP improved to the 160's systolic.        "

## 2018-07-04 NOTE — ED NOTES
[facility]  ED Nurse Handoff Report    ED Chief complaint: Abdominal Pain (Patient awoke around 3am to use the BR. Patient noted a bulge in her RLQ near her known hernia. Painful. Patient voided. No blood in urine. Some flatulence. No BM. Abdomen is obese and distended. Firm bulge palpated in RLQ abdomen. Patient is very diaphretic and pale. Nausea. )      ED Diagnosis:   Final diagnoses:   None       Code Status: Full Code    Allergies:   Allergies   Allergen Reactions     Aspirin Hives     Reaction occurred during childhood.      Minocycline      Yellow Dye Allergy. Minocycline has Yellow Dye #10.     Yellow Dye Hives     Rxn to yellow tablet. Eyes swelled shut.        Activity level - Baseline/Home:  Stand with Assist    Activity Level - Current:   Stand with Assist     Needed?: No    Isolation: No  Infection: Not Applicable  Bariatric?: No    Vital Signs:   Vitals:    07/04/18 0715 07/04/18 0730 07/04/18 0800 07/04/18 0900   BP: 164/85 135/63 142/83 186/80   Resp:  20 14 14   Temp:       TempSrc:       SpO2: 97% 98% 98% 98%   Height:           Cardiac Rhythm: 1st degree heart block on EKG,        Pain level: 0-10 Pain Scale: 9    Is this patient confused?: No   [place] - Suicide Severity Rating Scale Completed?  Yes  If yes, what color did the patient score?  White    Patient Report: Initial Complaint: Pt presents to ED with RLQ pain that began last night when pt felt a bulge RLQ  in the area of her known hernia last night 0300 when getting up to go to the bathroom. Pt arrives diaphoretic in a lot of pain. 4mg initial dose of morphine alleviates pain, but after going to CT scan and moving around the pain worsens. Pt is guarding the RLQ. BPs elevated when in pain. Placed on 2L NC after giving morphine for desaturation to 91-90%.    Focused Assessment: AOx3. Skin moist/warm. Breathing easy, non-labored. Scattered wheeze ausc. Large obese abd/soft with mass palpated in the RLQ.   Tests  Performed: labs, EKG, CT  Abnormal Results:   Results for orders placed or performed during the hospital encounter of 07/04/18   CT Abdomen Pelvis w Contrast    Narrative    CT ABDOMEN AND PELVIS WITH CONTRAST   7/4/2018 8:59 AM     HISTORY: Acute onset pain near ventral hernia.     TECHNIQUE: 135mL Isovue-370. Radiation dose for this scan was reduced  using automated exposure control, adjustment of the mA and/or kV  according to patient size, or iterative reconstruction technique.    COMPARISON: CT scan from 9/21/2016.    FINDINGS: Included lung bases are unremarkable.    Mild fatty infiltration of the liver. Previous cholecystectomy. Spleen  and pancreas are unremarkable.    No adrenal lesions. There is increased density in the left renal  pelvis extending into the proximal ureter that is likely contrast.  There is, however, no contrast in the right renal collecting system.  This could be asymmetric contrast excretion. It would be difficult to  exclude some portion of this area of increased density representing  stones, but this has the appearance of contrast. Multiple bilateral  renal cortical cysts. No retroperitoneal adenopathy or evidence of  aortic aneurysm.    Scans through the pelvis demonstrate a normal appearing bladder. No  pelvic adenopathy. No adnexal masses.    Scattered colonic diverticuli without evidence of diverticulitis.  There is a large ventral hernia containing a loop of transverse colon,  but no evidence of obstruction. No dilated bowel. No evidence of  appendicitis. No free air or free fluid.      Impression    IMPRESSION:  1. Large ventral hernia containing a loop of transverse colon, but no  evidence of obstruction or inflammation.  2. Contrast seen in the left renal pelvis likely due to asymmetric  excretion of contrast.  3. Multiple bilateral renal cortical cysts.  4. No evidence of diverticulitis or appendicitis. No thickened bowel  wall or evidence of obstruction.   CBC with platelets  differential   Result Value Ref Range    WBC 11.5 (H) 4.0 - 11.0 10e9/L    RBC Count 4.91 3.8 - 5.2 10e12/L    Hemoglobin 13.4 11.7 - 15.7 g/dL    Hematocrit 41.7 35.0 - 47.0 %    MCV 85 78 - 100 fl    MCH 27.3 26.5 - 33.0 pg    MCHC 32.1 31.5 - 36.5 g/dL    RDW 15.4 (H) 10.0 - 15.0 %    Platelet Count 294 150 - 450 10e9/L    Diff Method Automated Method     % Neutrophils 69.7 %    % Lymphocytes 22.2 %    % Monocytes 5.7 %    % Eosinophils 1.7 %    % Basophils 0.3 %    % Immature Granulocytes 0.4 %    Nucleated RBCs 0 0 /100    Absolute Neutrophil 8.0 1.6 - 8.3 10e9/L    Absolute Lymphocytes 2.6 0.8 - 5.3 10e9/L    Absolute Monocytes 0.7 0.0 - 1.3 10e9/L    Absolute Eosinophils 0.2 0.0 - 0.7 10e9/L    Absolute Basophils 0.0 0.0 - 0.2 10e9/L    Abs Immature Granulocytes 0.1 0 - 0.4 10e9/L    Absolute Nucleated RBC 0.0    Comprehensive metabolic panel   Result Value Ref Range    Sodium 137 133 - 144 mmol/L    Potassium 4.9 3.4 - 5.3 mmol/L    Chloride 104 94 - 109 mmol/L    Carbon Dioxide 23 20 - 32 mmol/L    Anion Gap 10 3 - 14 mmol/L    Glucose 212 (H) 70 - 99 mg/dL    Urea Nitrogen 20 7 - 30 mg/dL    Creatinine 1.28 (H) 0.52 - 1.04 mg/dL    GFR Estimate 40 (L) >60 mL/min/1.7m2    GFR Estimate If Black 48 (L) >60 mL/min/1.7m2    Calcium 9.0 8.5 - 10.1 mg/dL    Bilirubin Total 0.5 0.2 - 1.3 mg/dL    Albumin 3.8 3.4 - 5.0 g/dL    Protein Total 8.3 6.8 - 8.8 g/dL    Alkaline Phosphatase 65 40 - 150 U/L    ALT 20 0 - 50 U/L    AST 22 0 - 45 U/L   Lipase   Result Value Ref Range    Lipase 218 73 - 393 U/L   Lactic acid whole blood   Result Value Ref Range    Lactic Acid 2.7 (H) 0.7 - 2.0 mmol/L   Creatinine POCT   Result Value Ref Range    Creatinine 1.3 (H) 0.52 - 1.04 mg/dL    GFR Estimate 39 (L) >60 mL/min/1.7m2    GFR Estimate If Black 47 (L) >60 mL/min/1.7m2   EKG 12-lead, tracing only   Result Value Ref Range    Interpretation ECG Click View Image link to view waveform and result        Treatments provided: 500cc  bolus LR, 12mg morphine, 4 mg zofran    Family Comments: none    OBS brochure/video discussed/provided to patient: N/A    ED Medications:   Medications   morphine (PF) injection 2 mg (2 mg Intravenous Given 7/4/18 0925)   morphine (PF) injection 4 mg (not administered)   morphine (PF) injection 4 mg (4 mg Intravenous Given 7/4/18 0708)   ondansetron (ZOFRAN) injection 4 mg (4 mg Intravenous Given 7/4/18 0705)   lactated ringers BOLUS 1,000 mL (0 mLs Intravenous Stopped 7/4/18 0810)   Saline Flush (80 mLs Intravenous Given 7/4/18 0843)   iopamidol (ISOVUE-370) solution 135 mL (135 mLs Intravenous Given 7/4/18 0842)       Drips infusing?:  No    For the majority of the shift this patient was Green.   Interventions performed were .    Severe Sepsis OR Septic Shock Diagnosis Present: No      ED NURSE PHONE NUMBER: [phone]

## 2018-07-04 NOTE — H&P
Admitted:     07/04/2018      PRIMARY CARE PROVIDER:  Maryan Churchill MD      CHIEF COMPLAINT:  Abdominal pain.      HISTORY OF PRESENT ILLNESS:  Moira Andre is an 84-year-old female with past medical history of diabetes mellitus type 2, CKD, depression, obesity, coronary artery disease, obstructive sleep apnea, and asthma who presented to the emergency department for evaluation of abdominal pain.  The patient reports that she has a known ventral hernia.  She has had it for greater than 10 years and has undergone repair x 1 in the past; however, it did recur.  She reports that over the past 1-2 years she has noted slight increase in size; however, it has never caused her problems.  She abruptly awoke overnight at approximately 3:00 a.m., went to the bathroom, and upon lying back down noticed that she was experiencing a great deal of pain alongside her ventral hernia.  She then got up and went to the bathroom once again thinking that she may need to have a bowel movement; however, was unsuccessful.  The pain persisted and did not improve and thus prompted patient to present to the emergency department for further evaluation.      On arrival in the emergency department, the patient was noted to be hypertensive with remainder of vital signs reassuring.  She had ongoing abdominal pain with a palpable ventral hernia on the right lower abdomen.  She underwent imaging in the form of CT scan of abdomen and pelvis which revealed a large ventral hernia containing a loop of transverse colon without evidence of obstruction or inflammation.  The patient was discussed with Surgical Service from the emergency department who recommended admission to the hospitalist service for ongoing monitoring and with general surgery consultation.  The patient was administered a total of 6 mg of IV morphine in the emergency department without marked improvement in pain.  Laboratory studies included a CMP as well as CBC indicating a creatinine  of 1.28 and slight leukocytosis to 11.5.  Additionally, her lactic acid was elevated at 2.7 for which she was given 1 liter of IV fluids.  Urinalysis was also performed and was abnormal.  The patient was thus discussed with Hospitalist Service for admission.      The patient is presently evaluated in the emergency department.  She is currently sleepy and does fall asleep numerous times during interview, related to pain medication.  She does report ongoing discomfort in her right lower abdomen.  She reports that the pain is not radiating.  There are no alleviating factors.  She does report that it has been worsened with any form of activity.  She denies any associated nausea or vomiting, reports that her last bowel movement was yesterday morning and was normal in character.  She denies having any urinary symptoms including dysuria, frequency or urgency.  Reports that in the last week she has been in her otherwise normal state of health without fevers, chills, cough, congestion, chest pain, chest discomfort.  She continues to report discomfort rated at 4-5/10.      PAST MEDICAL HISTORY:   1.  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   2.  Depression.   3.  Obesity.   4.  Chronic pain.   5.  Coronary artery disease, status post bare metal stent x 2 to the OM as all well diagonal in 2007.  The patient's most recent angiogram was performed in 2017 due to progressive exertional dyspnea, which indicated a 50%-60% proximal circumflex lesion which was negative for FFR as well as IFR and thus deemed to be a nonflow limiting lesion.  The remainder of her vessels had 30% or less disease.  The patient is maintained on long-term Plavix due to an aspirin allergy.   6.  Intermittent asthma.   7.  Hypertension.   8.  Hyperlipidemia.   9.  Obstructive sleep apnea, uses CPAP.   10.  Chronic kidney disease (CKD), stage 3.  Baseline creatinine on chart review appears to be between  1.1 and 1.2.      PAST SURGICAL HISTORY:   1.  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   2.  Bilateral carpal tunnel surgery.   3.  Right total knee arthroplasty.   4.  Total abdominal hysterectomy.   5.  Appendectomy.   6.  Breast biopsy x 2, both of which were benign.   7.  Cholecystectomy.   8.  Coronary angiography x 2 in 2007 as well and in 2013 and most recently in 2017 as noted above.      PRIOR TO ADMISSION MEDICATIONS:    Prior to Admission medications    Medication Sig Last Dose Taking? Auth Provider   acetaminophen (TYLENOL) 325 MG tablet Take 325-650 mg by mouth every 6 hours as needed for mild pain  at prn Yes Reported, Patient   ADVAIR DISKUS 100-50 MCG/DOSE diskus inhaler INHALE 1 PUFF EVERY 12 HOURS 7/3/2018 at x2 Yes Maryan Churchill MD   albuterol (PROAIR HFA/PROVENTIL HFA/VENTOLIN HFA) 108 (90 BASE) MCG/ACT Inhaler Inhale 2 puffs into the lungs every 6 hours as needed for shortness of breath / dyspnea  at prn Yes Maryan Churchill MD   atorvastatin (LIPITOR) 20 MG tablet Take 1 tablet (20 mg) by mouth daily 7/3/2018 at Unknown time Yes Maryan Churchill MD   Cholecalciferol (VITAMIN D) 2000 UNITS CAPS Take by mouth daily 7/3/2018 at Unknown time Yes Reported, Patient   clopidogrel (PLAVIX) 75 MG tablet Take 1 tablet (75 mg) by mouth daily 7/3/2018 at Unknown time Yes Maryan Churchill MD   DULoxetine (CYMBALTA) 60 MG EC capsule TAKE 1 CAPSULE EVERY DAY  (DOSE  INCREASE) 7/3/2018 at Unknown time Yes Maryan Churchill MD   fluticasone (FLONASE) 50 MCG/ACT nasal spray Spray 1 spray into both nostrils as needed   at prn Yes Reported, Patient   HYDROcodone-acetaminophen (NORCO) 5-325 MG per tablet Take 1 tablet by mouth every 8 hours as needed for moderate to severe pain  at prn Yes Maryan Churchill MD   IBANdronate (BONIVA) 150 MG tablet Take 150 mg by mouth every 30 days Patient takes on the first day of each month. 7/1/2018 Yes Unknown, Entered By  History   lisinopril (PRINIVIL/ZESTRIL) 10 MG tablet Take 1 tablet (10 mg) by mouth daily 7/3/2018 at Unknown time Yes Maryan Churchill MD   Menthol, Topical Analgesic, (ICY HOT EX) Apply to affected area every morning 7/3/2018 at Unknown time Yes Unknown, Entered By History   metoprolol succinate (TOPROL-XL) 100 MG 24 hr tablet TAKE 1 TABLET EVERY DAY  (DUE  FOR  OFFICE  VISIT, PLEASE MAKE APPT.) 7/3/2018 at Unknown time Yes Maryan Churchill MD   Multiple Vitamins-Minerals (HAIR SKIN AND NAILS FORMULA PO) Take by mouth daily 7/3/2018 at Unknown time Yes Reported, Patient   neomycin-polymyxin-hydrocortisone (CORTISPORIN) otic solution Place 4 drops into both ears 4 times daily 7/3/2018 at x4 Yes Maryan Churchill MD   Nitroglycerin (NITROQUICK SL) Place 0.4 mg under the tongue every 5 minutes as needed   at prn Yes Reported, Patient   nystatin-triamcinolone (MYCOLOG II) cream APPLY TOPICALLY TWICE DAILY 7/3/2018 at Unknown time Yes Maryan Churchill MD   blood glucose monitoring (NO BRAND SPECIFIED) meter device kit Use to test blood sugar 1-2 times daily or as directed.   Maryan Churchill MD   blood glucose monitoring (TRUE METRIX BLOOD GLUCOSE TEST) test strip 1 strip by In Vitro route daily   Maryan Churchill MD   Blood Glucose Monitoring Suppl (TRUE METRIX AIR GLUCOSE METER) W/DEVICE KIT USE AS DIRECTED   Maryan Churchill MD   metFORMIN (GLUCOPHAGE-XR) 500 MG 24 hr tablet Take 2 tablets (1,000 mg) by mouth daily (with dinner) 7/2/2018  Maryan Churchill MD   order for DME DREAMSTATION  5-15 CM/H20  NASAL WISP FABRIC   Reported, Patient   TRUEPLUS LANCETS 28G MISC 1 each 2 times daily as needed   Maryan Churchill MD       ALLERGIES:   1.  ASPIRIN CAUSING HIVES.   2.  MINOCYCLINE, REACTION IS UNKNOWN.   3.  YELLOW DYE CAUSING HIVES.      FAMILY HISTORY:  Mother with history of MS, passed in her 60s.  Father with history of coronary artery disease, passed in his 80s, likely secondary to prostate cancer.       SOCIAL HISTORY:  The patient currently lives alone in an apartment in [place].  She does have a son and daughter-in-law who are in town and live nearby.  She is a lifelong nonsmoker.  She rarely consumes alcohol.      REVIEW OF SYSTEMS:  A 10-point review of systems was performed and is otherwise negative.  Please refer to the HPI.      PHYSICAL EXAMINATION:   VITAL SIGNS:  Temperature 97.4, heart rate of 71, respiration rate of 16, blood pressure of 171/95, SpO2 of 99% on 2 liters of oxygen.   GENERAL:  Well-developed, obese, elderly female who appears younger than stated age.   HEENT:  Head is normocephalic.  Pupils are equal, round and react to light bilaterally.  EOMs are intact bilaterally.  Nose and mouth are patent.  Mucous membranes are moist.   LUNGS:  Clear to auscultation bilaterally without wheezes or crackles.  No increased work of breathing.   CARDIOVASCULAR:  Regular rate and rhythm, normal S1, S2, no murmur.   ABDOMEN:  Obese, it is tender to right lower quadrant.  There is no guarding or rigidity.  There is no rebound tenderness.  She has hypoactive bowel sounds appreciated diffusely.  There is a slight mass appreciated in the right lower quadrant which is moveable.  There is no evidence of erythema or skin changes.     EXTREMITIES:  The patient is spontaneously moving bilateral upper and lower extremities.  Radial and pedal pulses are 2+ and palpable bilaterally.   SKIN:  Warm and dry.  No pedal edema.      LABORATORY AND IMAGING:  As reviewed in Epic and as in HPI.      ASSESSMENT AND PLAN:  Moira Andre is an 84-year-old female with past medical history of diabetes mellitus type 2, obesity, chronic pain, coronary artery disease, asthma as well as CKD stage 3, who presented to the emergency department for evaluation of abdominal discomfort.  The patient has a known ventral hernia which was demonstrated on CT abdomen and pelvis.  However, despite multiple doses of IV pain medication and no  evidence of strangulation on CT, patient had ongoing discomfort and thus will be admitted to the Hospitalist Service for further management and monitoring.      1.  Symptomatic ventral hernia.  The patient has a known ventral hernia which has been there for greater than 10 years.  She does report in the last 1-2 years it has been increasing in size but has never caused her pain until this morning.  Pain is nonradiating and nonyielding.  She did not have associated nausea or vomiting.  Last bowel movement was yesterday morning.  Presently, patient is continuing to report pain despite significant sedation secondary to narcotic pain medication.  She will be admitted under inpatient status.  She will be kept n.p.o. and we will ask General Surgery to evaluate.  Additionally, we will hold prior to admission Plavix in the event that patient will require operative intervention.  She will have maintenance IV fluids running and she will also have p.r.n. IV Dilaudid for any further discomfort as well as p.r.n. antiemetics.  The patient does have a history of an appendectomy as well as cholecystectomy.     2.  Acute on chronic kidney injury.  The patient with a creatinine of 1.3, with baseline renal function of approximately 1.1 to 1.2.  The patient did receive IV contrast during her CT scan.  May be due to prerenal azotemia versus contrast-induced nephropathy.  Holding PTA metformin and lisinopril given increase. We will have IV fluids running at maintenance and we will monitor BMP and avoid further nephrotoxic medications as able.   3.  Lactic acidosis.  Lactate elevated to 2.7, received 1L IV fluid in ED. May be due to dehydration given TIFFANI, vitals and labwork not concerning for infectious process. Cannot rule-out possibility of mesenteric ischemia given above. Will repeat on admission following IV fluid bolus.  4.  Abnormal UA. UA on admission abnormal with 10 WBCs, small LE, large blood. Patient denies dysuria, frequency  or urgency. Not concerning for infection presently. Urine culture sent, will follow. Will not initiate Abx at this time.  5.  Diabetes mellitus type 2.  Last hemoglobin A1C was 7.0% on 2018.  Patient prior to admission was maintained on metformin, which will be held given her acute kidney injury.  She will be placed on a high insulin sliding scale with Accu-Cheks as per protocol.   6.  Hypertension.  Holding prior to admission lisinopril given acute kidney injury.  We will resume prior to admission metoprolol.   7.  Hyperlipidemia.  Continue prior to admission atorvastatin.   8.  Coronary artery disease.  On lifelong Plavix secondary to aspirin intolerance.  The patient denies any recent symptoms concerning for cardiac decompensation including shortness of breath or chest discomfort.  We will hold prior to admission Plavix at this time until patient can be evaluated by the Surgical Service.  She is resumed on her prior to admission beta blocker and statin as above.   9.  Obstructive sleep apnea.  CPAP with home settings.   10.  Asthma, intermittent.  Continue prior to admission inhalers.   11.  Depression.  Continues on prior to admission Cymbalta.   11.  Deep venous thrombosis prophylaxis:  PCDs.      CODE STATUS:  Patient is a full code.      The patient was staffed with Dr. Martina Bianchi who independently interviewed and evaluated patient who is in agreement with the above-mentioned plan.         MARTINA BIANCHI MD       As dictated by LAURA VILLA PA-C            D: 2018   T: 2018   MT: CC      Name:     JOSIE ARRIAGA   MRN:      [id number]        Account:      [id number]   :      1933        Admitted:     2018                   Document: [id number]       cc: Maryan Churchill MD

## 2018-07-04 NOTE — PROGRESS NOTES
Nursing arrived up from ED. Still active nausea. Pt able to ambulate to and from bathroom SBA. Pt 88 on RA. Pt place on 2L o2. Some slight wheezing heard.

## 2018-07-04 NOTE — CONSULTS
General Surgery Consultation    Moira Andre MRN#: [id number]   Age: 84 year old YOB: 1933     Date of Admission:  7/4/2018  Reason for consult: Abdominal Pain and Ventral Hernia       Requesting physician: Mohan Gonzales PA-C       Surgeon:        Ray Lennon MD        Chief Complaint:   Abdominal pain, periumbilical  Known large ventral hernia     History is obtained from the patient and chart         History of Present Illness:   General Surgery was asked to evaluate this patient at the request of Mohan Gonzales PA-C for acute onset of abdominal pain.    This patient is a 84 year old female with a significant past medical history of depression, chronic kidney disease, asthma, coronary artery disease, diabetes and morbid obesity who presents with abdominal pain that woke her out of her sleep this morning around 0300.  She got up to use the bathroom, but her pain continued.  She got up again thinking she had to move her bowels but was unsuccessful.  She does report passing flatus this morning, but her last BM was yesterday morning and was normal except for it was hard.  Denies any blood.    Today she presented to the ED with persistent abdominal pain and nausea.  She has not had an emesis.  She was given narcotics in the ED to control her pain, so she is sleepy due to this and being up since 0300 with pain.    Of note, the patient reports, at the time of her cholecystectomy, ?Dr. Hoffman tried to repair her umbilical hernia as well.  Mesh was used per pt.  This did not hold however, and the hernia recurred.  It has been present for 10+ years and not caused her problems, though she reports it growing in size over the last few years.          Past Medical History:   Moira Andre  has a past medical history of Aortic valve sclerosis; Arrhythmia; Aspirin allergy; Asthma; CKD (chronic kidney disease) stage 3, GFR 30-59 ml/min; Congestive heart failure, unspecified; Depression; Diabetes mellitus (H) (2010);  Diastolic dysfunction, left ventricle (2013); HTN (hypertension); Migraine headache; Myocardial infarction (9/2007, cath 2013 ml); OA (osteoarthritis) of knee; Obesity; Rheumatoid arthritis flare (H); Sleep apnea; TIA (transient ischaemic attack); and Ventral hernia, unspecified, without mention of obstruction or gangrene.          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 Authorizing Provider   acetaminophen (TYLENOL) 325 MG tablet Take 325-650 mg by mouth every 6 hours as needed for mild pain   Yes Reported, Patient   ADVAIR DISKUS 100-50 MCG/DOSE diskus inhaler INHALE 1 PUFF EVERY 12 HOURS 4/16/18  Yes Maryan Churchill MD   albuterol (PROAIR HFA/PROVENTIL HFA/VENTOLIN HFA) 108 (90 BASE) MCG/ACT Inhaler Inhale 2 puffs into the lungs every 6 hours as needed for shortness of breath / dyspnea 4/3/18  Yes Maryan Churchill MD   atorvastatin (LIPITOR)  20 MG tablet Take 1 tablet (20 mg) by mouth daily 6/13/18  Yes Maryan Churchill MD   Cholecalciferol (VITAMIN D) 2000 UNITS CAPS Take by mouth daily   Yes Reported, Patient   clopidogrel (PLAVIX) 75 MG tablet Take 1 tablet (75 mg) by mouth daily 4/3/18  Yes Maryan Churchill MD   DULoxetine (CYMBALTA) 60 MG EC capsule TAKE 1 CAPSULE EVERY DAY  (DOSE  INCREASE) 5/10/18  Yes Maryan Churchill MD   fluticasone (FLONASE) 50 MCG/ACT nasal spray Spray 1 spray into both nostrils as needed    Yes Reported, Patient   HYDROcodone-acetaminophen (NORCO) 5-325 MG per tablet Take 1 tablet by mouth every 8 hours as needed for moderate to severe pain 4/3/18  Yes Maryan Churchill MD   IBANdronate (BONIVA) 150 MG tablet Take 150 mg by mouth every 30 days Patient takes on the first day of each month.   Yes Unknown, Entered By History   lisinopril (PRINIVIL/ZESTRIL) 10 MG tablet Take 1 tablet (10 mg) by mouth daily 5/3/18  Yes Maryan Churchill MD   Menthol, Topical Analgesic, (ICY HOT EX) Apply to affected area every morning   Yes Unknown, Entered By History   metoprolol succinate (TOPROL-XL) 100 MG 24 hr tablet TAKE 1 TABLET EVERY DAY  (DUE  FOR  OFFICE  VISIT, PLEASE MAKE APPT.) 5/22/18  Yes Maryan Churchill MD   Multiple Vitamins-Minerals (HAIR SKIN AND NAILS FORMULA PO) Take by mouth daily   Yes Reported, Patient   neomycin-polymyxin-hydrocortisone (CORTISPORIN) otic solution Place 4 drops into both ears 4 times daily 4/3/18  Yes Maryan Churchill MD   Nitroglycerin (NITROQUICK SL) Place 0.4 mg under the tongue every 5 minutes as needed    Yes Reported, Patient   nystatin-triamcinolone (MYCOLOG II) cream APPLY TOPICALLY TWICE DAILY 5/10/18  Yes Maryan Churchill MD   blood glucose monitoring (NO BRAND SPECIFIED) meter device kit Use to test blood sugar 1-2 times daily or as directed. 6/10/16   Maryan Churchill MD   blood glucose monitoring (TRUE METRIX BLOOD GLUCOSE TEST) test strip 1 strip by In Vitro route daily 8/29/17    "Maryan Churchill MD   Blood Glucose Monitoring Suppl (TRUE METRIX AIR GLUCOSE METER) W/DEVICE KIT USE AS DIRECTED 6/28/17   Maryan Churchill MD   metFORMIN (GLUCOPHAGE-XR) 500 MG 24 hr tablet Take 2 tablets (1,000 mg) by mouth daily (with dinner) 8/15/17   Maryan Churchill MD   order for DME DREAMSTATION  5-15 CM/H20  NASAL WISP F[employer]RIC    Reported, Patient   TRUEPLUS LANCETS 28G MISC 1 each 2 times daily as needed 6/10/16   Maryan Churchill MD             Review of Systems:   The Review of Systems is negative other than noted in the HPI          Physical Exam:   Blood pressure (!) 171/95, pulse 61, temperature 97.4  F (36.3  C), temperature source Oral, resp. rate 11, height 1.702 m (5' 7\"), weight 127 kg (280 lb), SpO2 98 %, not currently breastfeeding.    General - This is a well developed, well nourished female in no apparent distress.  She is mildly diaphoretic however and sleepy.  Appears comfortable otherwise.  HEENT - Normocephalic. Atraumatic. Moist mucous membranes. Pupils equal.  No scleral icterus.  Neck - Supple without masses.  Lungs - Clear to ascultation bilaterally.    Heart - Regular rate & rhythm without murmur.  Abdomen - Obese, soft, nontender, nondistended with rare bowel sounds.   Large bulge around umbilicus with bowel likely in hernia sac.  Cannot reduce, but pt does not seem in discomfort while trying.   Extremities - Moves all extremities. Warm without edema.  Neurologic - Nonfocal.          Data:   Labs:  WBC -   WBC   Date Value Ref Range Status   07/04/2018 11.5 (H) 4.0 - 11.0 10e9/L Final     Hgb -   Hemoglobin   Date Value Ref Range Status   07/04/2018 13.4 11.7 - 15.7 g/dL Final       Liver Function Studies -   Recent Labs   Lab Test  07/04/18   0658   PROTTOTAL  8.3   ALBUMIN  3.8   BILITOTAL  0.5   ALKPHOS  65   AST  22   ALT  20       CT scan of the abdomen:   IMPRESSION:  1. Large ventral hernia containing a loop of transverse colon, but no  evidence of obstruction or " inflammation.  2. Contrast seen in the left renal pelvis likely due to asymmetric  excretion of contrast.  3. Multiple bilateral renal cortical cysts.  4. No evidence of diverticulitis or appendicitis. No thickened bowel  wall or evidence of obstruction.     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  Consider NGT if emesis.  Discussed briefly with patient.  Up to ambulate if/when feeling better  NPO.  Would start clears after passing more flatus, pain subsides    This would be a difficult operation due to large hernia and morbid obesity, amongst other comorbidities that could lead to post op complications/longer hospitalization.  Would benefit from trial of conservative management of this hernia.    Surgery team will reassess progress in morning.        Francisco Javier Reddy PA-C, physician assistant for Ray Lennon  [facility], [phone]  Pager [phone]

## 2018-07-04 NOTE — ED PROVIDER NOTES
History     Chief Complaint:  Abdominal pain    HPI   Moira Andre is a pleasant 84 year old female  with a history of ventral hernia  who presents to the emergency department for evaluation of nausea and acute onset pain at the site of her hernia while she was sleeping. The patient reports that she got up to use the bathroom this morning where she felt an intense sharp pain as well as a firm bulge around the site of her hernia. She reports that she did not move her bowels at the time. She denies chest pain, shortness of breath, blood in urine.  Has passed gas since pain started.    Allergies:  Aspirin  Minocycline  Yellow Dye    Medications:    Advair Diskus  Albuterol  Lipitor  Blood glucose monitoring  Plavix  Voltaren  Cymbalta  Flonase  Boniva  Lisinopril  Metformin  Toprol  Nitroquick  Trupelus    Past Medical History:    Aortic valve sclerosis   Arrhythmia   Aspirin allergy   Asthma   CKD (chronic kidney disease) stage 3, GFR 30-59 ml/min   Congestive heart failure, unspecified   Depression   Diabetes mellitus (H)   Diastolic dysfunction, left ventricle   HTN (hypertension)   Migraine headache   Myocardial infarction   Osteoarthritis  Anxiety  Hypertension  Bilateral lower extremity edema  Gastroesophageal reflux   Anemia  Coronary artery disease  OA (osteoarthritis) of knee   Obesity   Rheumatoid arthritis flare (H)   Sleep apnea   TIA (transient ischaemic attack)   Ventral hernia, unspecified, without mention of obstruction or gangrene    Past Surgical History:    Appendectomy  Cholecystectomy  Coronary Angiography  Heart Catheterization  Hysterectomy     Family History:    MS  coronary artery disease    Social History:  The reports that she quit smoking about 45 years ago. She has never used smokeless tobacco. She reports that she drinks alcohol. She reports that she does not use illicit drugs.   Marital Status:   [4]     Review of Systems   Constitutional: Positive for diaphoresis.  "  Respiratory: Negative for shortness of breath.    Gastrointestinal: Positive for abdominal pain. Negative for blood in stool.   Genitourinary: Negative for dysuria.   All other systems reviewed and are negative.    Physical Exam     Patient Vitals for the past 24 hrs:   BP Temp Temp src Heart Rate Resp SpO2 Height Weight   07/04/18 0944 - - - - - - - 127 kg (280 lb)   07/04/18 0900 186/80 - - 58 14 98 % - -   07/04/18 0800 142/83 - - 64 14 98 % - -   07/04/18 0730 135/63 - - 68 20 98 % - -   07/04/18 0715 164/85 - - - - 97 % - -   07/04/18 0704 163/76 - - - - 95 % - -   07/04/18 0654 (!) 221/101 97.4  F (36.3  C) Temporal 56 - 96 % 1.702 m (5' 7\") -       Physical Exam  Eyes:                                   Sclera white; Pupils are equal and round  ENT:                                    External ears and nares normal  CV:                                      Rate as above with regular rhythm   Resp:                                   Breath sounds clear and equal bilaterally  GI:                                       Abdomen is soft, non-tender, non-distended, unable to appreciate any pulsatile mass                                              No rebound tenderness or peritoneal features                                              Points directly to R sided ventral hernia as location of pain.  At this time is not markedly firm, tender, warm, or red.  Attempted reduction unsuccessful.  MS:                                      Moves all extremities  Skin:                                    Mildly diaphoretic  Neuro:                                 Speech is normal and fluent. No apparent deficit.    Emergency Department Course   ECG (07:20:49):  Rate 70 bpm. NJ interval 308. QRS duration 102. QT/QTc 426/460. P-R-T axes 59 -12 80. Sinus rhythm with 1st degree AV block. Low voltage QRS. Cannot rule out Anterior infarct, age undetermined. Abnormal EKG.  Interpreted at 0733 by Colleen Tanner, " *.    Imaging:  Radiology findings were communicated with the patient who voiced understanding of the findings.  CT Abdomen Pelvis w Contrast   Preliminary Result   IMPRESSION:   1. Large ventral hernia containing a loop of transverse colon, but no   evidence of obstruction or inflammation.   2. Contrast seen in the left renal pelvis likely due to asymmetric   excretion of contrast.   3. Multiple bilateral renal cortical cysts.   4. No evidence of diverticulitis or appendicitis. No thickened bowel   wall or evidence of obstruction.           Laboratory:  Creatinine POCT: Creat 1.3L, GFR Estimate 39L, GFR Estimate if black 47L  CBC: WBC 11.5H, RDW 15.4H.   CMP: Glucose 212H, Creatinine 1.28H, GFR Estimate 40L, GFR Estimate if black 48L   Lipase: 218  Lactic acid 2.7H.     Interventions:  0705: Zofran 4mg  0708: Morphine 4mg  0732: NS 1L IV Bolus  0810:  Lactated ringers bolus 1000 mL given  0925: Morphine 2mg     Emergency Department Course:  0707 Nursing notes and vitals reviewed.  I performed an exam of the patient as documented above.       IV inserted. Medicine administered as documented above. Blood drawn. This was sent to the lab for further testing, results above.    The patient was sent for a CT and EKG while in the emergency department, findings above.     0926 I rechecked the patient and discussed the results of her workup thus far. She reported that her pain has returned to initial levels.     I spoke to Dr. Lennon of General surgery regarding the clinical findings.     Findings and plan explained to the Patient who consents to admission.     Discussed the patient with Dr. Hernandez, who will admit the patient to a [facility] bed for further monitoring, evaluation, and treatment.    Impression & Plan      Medical Decision Making:  Moira Andre is a 84 year old female who is here for acute onset of abdominal pain focused at the site of her hernia. This was examined without obvious strangulation or incarceration  on exam. Reduction was attempted due to symptoms and hernia did seem to reduce partially if not fully and pain was improved after initial pain medicines.  Blood pressure improved as pain improved.  Work up was undertaken to also look for other etiologies of her abdominal pain. She is hypertensive and AAA was considered as well as appendicitis, diverticulitis, colitis and others. Ultimately CT scan only demonstrated a hernia without obvious obstruction or ischemic changes. Lactic acid returned elevated with no source of infection identified so this is not secondary to sepsis. UA returned a little abnormal but may simply be contamination. Case was discussed with general surgery due to recurrent pain and elevated lactic acid.  However with the wide based opening to the hernia, it would be very unusual to have this be incarcerated and feel that instead this is simply symptomatic hernia. At this point they recommend continued pain control given the patient's need for IV narcotics, she will be admitted.     Diagnosis:    ICD-10-CM    1. Abdominal pain, right lower quadrant R10.31 Urine Culture   2. Ventral hernia without obstruction or gangrene K43.9    3. Elevated lactic acid level R79.89    4. Abnormal urinalysis R82.90        Disposition:  Admitted.    Discharge Medications:  New Prescriptions    No medications on file     Cindy MCKENNA am serving as a scribe at 7:07 AM on 7/4/2018 to document services personally performed by Colleen Tanner, * based on my observations and the provider's statements to me.     EMERGENCY DEPARTMENT       Colleen Tanner MD  07/04/18 5124

## 2018-07-05 ENCOUNTER — SURGERY (OUTPATIENT)
Age: 83
End: 2018-07-05

## 2018-07-05 ENCOUNTER — ANESTHESIA EVENT (OUTPATIENT)
Dept: SURGERY | Facility: CLINIC | Age: 83
End: 2018-07-05

## 2018-07-05 ENCOUNTER — ANESTHESIA (OUTPATIENT)
Dept: SURGERY | Facility: CLINIC | Age: 83
End: 2018-07-05

## 2018-07-05 LAB
ANION GAP SERPL CALCULATED.3IONS-SCNC: 9 MMOL/L (ref 3–14)
BACTERIA SPEC CULT: NORMAL
BUN SERPL-MCNC: 22 MG/DL (ref 7–30)
CALCIUM SERPL-MCNC: 7.4 MG/DL (ref 8.5–10.1)
CHLORIDE SERPL-SCNC: 108 MMOL/L (ref 94–109)
CO2 SERPL-SCNC: 24 MMOL/L (ref 20–32)
CREAT SERPL-MCNC: 1.25 MG/DL (ref 0.52–1.04)
ERYTHROCYTE [DISTWIDTH] IN BLOOD BY AUTOMATED COUNT: 15.9 % (ref 10–15)
GFR SERPL CREATININE-BSD FRML MDRD: 41 ML/MIN/1.7M2
GLUCOSE BLDC GLUCOMTR-MCNC: 103 MG/DL (ref 70–99)
GLUCOSE BLDC GLUCOMTR-MCNC: 128 MG/DL (ref 70–99)
GLUCOSE BLDC GLUCOMTR-MCNC: 129 MG/DL (ref 70–99)
GLUCOSE BLDC GLUCOMTR-MCNC: 137 MG/DL (ref 70–99)
GLUCOSE BLDC GLUCOMTR-MCNC: 138 MG/DL (ref 70–99)
GLUCOSE SERPL-MCNC: 121 MG/DL (ref 70–99)
HCT VFR BLD AUTO: 35.3 % (ref 35–47)
HGB BLD-MCNC: 10.9 G/DL (ref 11.7–15.7)
HGB BLD-MCNC: 10.9 G/DL (ref 11.7–15.7)
LACTATE BLD-SCNC: 1.1 MMOL/L (ref 0.7–2)
Lab: NORMAL
MCH RBC QN AUTO: 26.7 PG (ref 26.5–33)
MCHC RBC AUTO-ENTMCNC: 30.9 G/DL (ref 31.5–36.5)
MCV RBC AUTO: 87 FL (ref 78–100)
PLATELET # BLD AUTO: 220 10E9/L (ref 150–450)
POTASSIUM SERPL-SCNC: 4.7 MMOL/L (ref 3.4–5.3)
RBC # BLD AUTO: 4.08 10E12/L (ref 3.8–5.2)
SODIUM SERPL-SCNC: 141 MMOL/L (ref 133–144)
SPECIMEN SOURCE: NORMAL
WBC # BLD AUTO: 9.1 10E9/L (ref 4–11)

## 2018-07-05 PROCEDURE — 00000146 ZZHCL STATISTIC GLUCOSE BY METER IP

## 2018-07-05 PROCEDURE — 25000132 ZZH RX MED GY IP 250 OP 250 PS 637: Performed by: PHYSICIAN ASSISTANT

## 2018-07-05 PROCEDURE — 99232 SBSQ HOSP IP/OBS MODERATE 35: CPT | Performed by: SURGERY

## 2018-07-05 PROCEDURE — 85027 COMPLETE CBC AUTOMATED: CPT | Performed by: INTERNAL MEDICINE

## 2018-07-05 PROCEDURE — 99207 ZZC CDG-CODE CATEGORY CHANGED: CPT | Performed by: INTERNAL MEDICINE

## 2018-07-05 PROCEDURE — 40000881 ZZH CANCELLED SURGERY UP TO 31-45 MINS: Performed by: SURGERY

## 2018-07-05 PROCEDURE — 80048 BASIC METABOLIC PNL TOTAL CA: CPT | Performed by: INTERNAL MEDICINE

## 2018-07-05 PROCEDURE — 36415 COLL VENOUS BLD VENIPUNCTURE: CPT | Performed by: INTERNAL MEDICINE

## 2018-07-05 PROCEDURE — 83605 ASSAY OF LACTIC ACID: CPT | Performed by: INTERNAL MEDICINE

## 2018-07-05 PROCEDURE — 85018 HEMOGLOBIN: CPT | Performed by: INTERNAL MEDICINE

## 2018-07-05 PROCEDURE — G0378 HOSPITAL OBSERVATION PER HR: HCPCS

## 2018-07-05 PROCEDURE — 99225 ZZC SUBSEQUENT OBSERVATION CARE,LEVEL II: CPT | Performed by: INTERNAL MEDICINE

## 2018-07-05 RX ORDER — SODIUM CHLORIDE, SODIUM LACTATE, POTASSIUM CHLORIDE, CALCIUM CHLORIDE 600; 310; 30; 20 MG/100ML; MG/100ML; MG/100ML; MG/100ML
INJECTION, SOLUTION INTRAVENOUS CONTINUOUS
Status: DISCONTINUED | OUTPATIENT
Start: 2018-07-05 | End: 2018-07-05 | Stop reason: HOSPADM

## 2018-07-05 RX ORDER — DEXTROSE MONOHYDRATE 25 G/50ML
25-50 INJECTION, SOLUTION INTRAVENOUS
Status: DISCONTINUED | OUTPATIENT
Start: 2018-07-05 | End: 2018-07-05

## 2018-07-05 RX ORDER — NICOTINE POLACRILEX 4 MG
15-30 LOZENGE BUCCAL
Status: DISCONTINUED | OUTPATIENT
Start: 2018-07-05 | End: 2018-07-05

## 2018-07-05 RX ADMIN — ATORVASTATIN CALCIUM 20 MG: 10 TABLET, FILM COATED ORAL at 21:26

## 2018-07-05 RX ADMIN — METOPROLOL SUCCINATE 100 MG: 100 TABLET, EXTENDED RELEASE ORAL at 07:58

## 2018-07-05 RX ADMIN — DULOXETINE HYDROCHLORIDE 60 MG: 60 CAPSULE, DELAYED RELEASE ORAL at 07:57

## 2018-07-05 RX ADMIN — FLUTICASONE PROPIONATE AND SALMETEROL 1 PUFF: 50; 100 POWDER RESPIRATORY (INHALATION) at 13:37

## 2018-07-05 RX ADMIN — FLUTICASONE PROPIONATE AND SALMETEROL 1 PUFF: 50; 100 POWDER RESPIRATORY (INHALATION) at 20:40

## 2018-07-05 NOTE — PROGRESS NOTES
"\"What Is Outpatient Observation\" brochure was given & explained to the patient.  Patient verbalized understanding and denies any questions at this time.    "

## 2018-07-05 NOTE — PLAN OF CARE
Problem: Patient Care Overview  Goal: Plan of Care/Patient Progress Review  Outcome: Improving  Pt A&Ox4. VSS. Weaned down to 1L O2 via NC, sats 95-96%.  Blood sugar 111. NPO except meds. IVF at 100ml/hr. Denies any pain/discomfort. Bowel sounds hypoactive. Negative for flatus. Incontinent of bladder. Up with SBA. D/c pending

## 2018-07-05 NOTE — PROGRESS NOTES
Dr Márquez talked with pt and updated her with the plan of care. Surgery has been cancelled. Pt is on plavix , he felt that she will be able to follow up in a week for surgery, but would need to be off plavix x 7 days. Plan is to return to floor, have something to eat. Possibly Discharge later today. Pending pt comfort. Pt agreeable to plan.

## 2018-07-05 NOTE — PROGRESS NOTES
MD Notification    Notified Person: MD    Notified Person Name: Damien Manning NP    Notification Date/Time: 7/4/18 4399    Notification Interaction: Phone    Purpose of Notification: Lactic acid 2.7    Orders Received: NP will review pt chart and place orders if needed    Comments:

## 2018-07-05 NOTE — UTILIZATION REVIEW
"  Admission Status; Secondary Review Determination         Under the authority of the Utilization Management Committee, the utilization review process indicated a secondary review on the above patient.  The review outcome is based on review of the medical records, discussions with staff, and applying clinical experience noted on the date of the review.          (x) Observation Status Appropriate - This patient does not meet hospital inpatient criteria and is placed in observation status. If this patient's primary payer is Medicare and was admitted as an inpatient, Condition Code 44 should be used and patient status changed to \"observation\".     RATIONALE FOR DETERMINATION   Admitted with ventral hernia and chronic incarceration, increasing abdominal pain, she is on Plavix, surgery felt that she has no need for urgent surgery, needs to be off Plavix for a week before, recommendation is to advance diet and discharge.  At the time of admission patient was in severe pain and  observation was appropriate. The severity of illness, intensity of service provided, expected LOS and risk for adverse outcome make the care appropriate for further observation; however, doesn't meet criteria for hospital inpatient admission. Dr. Hernandez  notified of this determination.    This document was produced using voice recognition software.      The information on this document is developed by the utilization review team in order for the business office to ensure compliance.  This only denotes the appropriateness of proper admission status and does not reflect the quality of care rendered.         The definitions of Inpatient Status and Observation Status used in making the determination above are those provided in the CMS Coverage Manual, Chapter 1 and Chapter 6, section 70.4.      Sincerely,     LEONA SMITH MD    System Medical Director  Utilization Management  [facility].      "

## 2018-07-05 NOTE — ANESTHESIA PREPROCEDURE EVALUATION
Anesthesia Evaluation     . Pt has had prior anesthetic.            ROS/MED HX    ENT/Pulmonary:     (+)sleep apnea, asthma uses CPAP , . .    Neurologic:       Cardiovascular:     (+) hypertension--CAD, --stent,. Taking blood thinners : . . . :. .       METS/Exercise Tolerance:     Hematologic:         Musculoskeletal:         GI/Hepatic:         Renal/Genitourinary:     (+) chronic renal disease, type: CRI,       Endo: Comment: Severe obesity BMI 44    (+) type II DM Not using insulin Obesity, .      Psychiatric:     (+) psychiatric history depression      Infectious Disease:         Malignancy:         Other:                                    Anesthesia Plan      History & Physical Review  History and physical reviewed and following examination; no interval change.    ASA Status:  3 .    NPO Status:  > 8 hours    Plan for General and ETT with Intravenous induction. Maintenance will be Balanced.    PONV prophylaxis:  Ondansetron (or other 5HT-3) and Dexamethasone or Solumedrol  Case delayed given recent clopidogrel treatment      Postoperative Care  Postoperative pain management:  Multi-modal analgesia.      Consents  Anesthetic plan, risks, benefits and alternatives discussed with:  Patient..        Procedure: Procedure(s):  LAPAROSCOPIC HERNIORRHAPHY VENTRAL  HERNIORRHAPHY VENTRAL  Preop diagnosis: for Ventral Hernia Repair    Allergies   Allergen Reactions     Aspirin Hives     Reaction occurred during childhood.      Minocycline      Yellow Dye Allergy. Minocycline has Yellow Dye #10.     Yellow Dye Hives     Rxn to yellow tablet. Eyes swelled shut.      Past Medical History:   Diagnosis Date     Aortic valve sclerosis     heart murmur, no AS     Arrhythmia     PAT, PVC     Aspirin allergy     Plavix use long term     Asthma      CKD (chronic kidney disease) stage 3, GFR 30-59 ml/min     x 2007 atleast     Congestive heart failure, unspecified      Depression      Diabetes mellitus (H) 2010     Diastolic  dysfunction, left ventricle 2013    grade 2, nl ef     HTN (hypertension)      Migraine headache      Myocardial infarction 9/2007, cath 2013 ml    BMS: stent to OM, diag, nl EF, echo /C angia 2013 , f/u cath no lesion >40%     OA (osteoarthritis) of knee      Obesity      Rheumatoid arthritis flare (H)     prednisone     Sleep apnea     on CPAP (not using 2016)     TIA (transient ischaemic attack)      Ventral hernia, unspecified, without mention of obstruction or gangrene      Past Surgical History:   Procedure Laterality Date     APPENDECTOMY       BIOPSY BREAST      x2 -needle & lumpectomy-benign     CHOLECYSTECTOMY       CORONARY ANGIOGRAPHY ADULT ORDER  9/28/2007    Bare metal stent to OM1, Diagonal patent      CORONARY ANGIOGRAPHY ADULT ORDER  9/25/2007    [place] stent to Diagonal     HC LEFT HEART CATHETERIZATION  8/2013    Moderate CAD     HYSTERECTOMY TOTAL ABDOMINAL       ORTHOPEDIC SURGERY      knee replacement on right side (2006), Left side (2016)     RELEASE CARPAL TUNNEL      right and left     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 Authorizing Provider   acetaminophen (TYLENOL) 325 MG tablet Take 325-650 mg by mouth every 6 hours as needed for mild pain   Yes Reported, Patient   ADVAIR DISKUS 100-50 MCG/DOSE diskus inhaler INHALE 1 PUFF EVERY 12 HOURS 4/16/18  Yes Maryan Churchill MD   albuterol (PROAIR HFA/PROVENTIL HFA/VENTOLIN HFA) 108 (90 BASE) MCG/ACT Inhaler Inhale 2 puffs into the lungs every 6 hours as needed for shortness of breath / dyspnea 4/3/18  Yes Maryan Churchill MD   atorvastatin (LIPITOR) 20 MG tablet Take 1 tablet (20 mg) by mouth daily 6/13/18  Yes  Maryan Churchill MD   Cholecalciferol (VITAMIN D) 2000 UNITS CAPS Take by mouth daily   Yes Reported, Patient   clopidogrel (PLAVIX) 75 MG tablet Take 1 tablet (75 mg) by mouth daily 4/3/18  Yes Maryan Churchill MD   DULoxetine (CYMBALTA) 60 MG EC capsule TAKE 1 CAPSULE EVERY DAY  (DOSE  INCREASE) 5/10/18  Yes Maryan Churchill MD   fluticasone (FLONASE) 50 MCG/ACT nasal spray Spray 1 spray into both nostrils as needed    Yes Reported, Patient   HYDROcodone-acetaminophen (NORCO) 5-325 MG per tablet Take 1 tablet by mouth every 8 hours as needed for moderate to severe pain 4/3/18  Yes Maryan Churchill MD   IBANdronate (BONIVA) 150 MG tablet Take 150 mg by mouth every 30 days Patient takes on the first day of each month.   Yes Unknown, Entered By History   lisinopril (PRINIVIL/ZESTRIL) 10 MG tablet Take 1 tablet (10 mg) by mouth daily 5/3/18  Yes Maryan Churchill MD   Menthol, Topical Analgesic, (ICY HOT EX) Apply to affected area every morning   Yes Unknown, Entered By History   metoprolol succinate (TOPROL-XL) 100 MG 24 hr tablet TAKE 1 TABLET EVERY DAY  (DUE  FOR  OFFICE  VISIT, PLEASE MAKE APPT.) 5/22/18  Yes Maryan Churchill MD   Multiple Vitamins-Minerals (HAIR SKIN AND NAILS FORMULA PO) Take by mouth daily   Yes Reported, Patient   neomycin-polymyxin-hydrocortisone (CORTISPORIN) otic solution Place 4 drops into both ears 4 times daily 4/3/18  Yes Maryan Churchill MD   Nitroglycerin (NITROQUICK SL) Place 0.4 mg under the tongue every 5 minutes as needed    Yes Reported, Patient   nystatin-triamcinolone (MYCOLOG II) cream APPLY TOPICALLY TWICE DAILY 5/10/18  Yes Maryan Churchill MD   blood glucose monitoring (NO BRAND SPECIFIED) meter device kit Use to test blood sugar 1-2 times daily or as directed. 6/10/16   Maryan Churchill MD   blood glucose monitoring (TRUE METRIX BLOOD GLUCOSE TEST) test strip 1 strip by In Vitro route daily 8/29/17   Maryan Churchill MD   Blood Glucose Monitoring Suppl (TRUE  METRIX AIR GLUCOSE METER) W/DEVICE KIT USE AS DIRECTED 6/28/17   Maryan Churchill MD   metFORMIN (GLUCOPHAGE-XR) 500 MG 24 hr tablet Take 2 tablets (1,000 mg) by mouth daily (with dinner) 8/15/17   Maryan Churchill MD   order for DME DREAMSTATION  5-15 CM/H20  NASAL WISP FABRIC    Reported, Patient   TRUEPLUS LANCETS 28G MISC 1 each 2 times daily as needed 6/10/16   Maryan Churchill MD     Current Facility-Administered Medications Ordered in Epic   Medication Dose Route Frequency Last Rate Last Dose     acetaminophen (TYLENOL) Suppository 650 mg  650 mg Rectal Q4H PRN         acetaminophen (TYLENOL) tablet 650 mg  650 mg Oral Q4H PRN         albuterol (PROAIR HFA/PROVENTIL HFA/VENTOLIN HFA) Inhaler 2 puff  2 puff Inhalation Q6H PRN         atorvastatin (LIPITOR) tablet 20 mg  20 mg Oral At Bedtime   20 mg at 07/04/18 2158     glucose gel 15-30 g  15-30 g Oral Q15 Min PRN        Or     dextrose 50 % injection 25-50 mL  25-50 mL Intravenous Q15 Min PRN        Or     glucagon injection 1 mg  1 mg Subcutaneous Q15 Min PRN         DULoxetine (CYMBALTA) EC capsule 60 mg  60 mg Oral Daily   60 mg at 07/05/18 0757     fluticasone-salmeterol (ADVAIR) 100-50 MCG/DOSE diskus inhaler 1 puff  1 puff Inhalation Q12H   1 puff at 07/04/18 1444     hydrALAZINE (APRESOLINE) injection 10 mg  10 mg Intravenous Q4H PRN   10 mg at 07/04/18 1335     HYDROmorphone (PF) (DILAUDID) injection 0.3-0.5 mg  0.3-0.5 mg Intravenous Q2H PRN   0.5 mg at 07/04/18 1255     insulin aspart (NovoLOG) inj (RAPID ACTING)  1-12 Units Subcutaneous Q4H   1 Units at 07/04/18 1615     lactated ringers infusion   Intravenous Continuous         lidocaine (LMX4) cream   Topical Q1H PRN         lidocaine 1 % 1 mL  1 mL Other Q1H PRN         lidocaine 1 % 1 mL  1 mL Other Q1H PRN         melatonin tablet 1 mg  1 mg Oral At Bedtime PRN         metoprolol succinate (TOPROL-XL) 24 hr tablet 100 mg  100 mg Oral Daily   100 mg at 07/05/18 0758     miconazole (MICATIN;  MICRO GUARD) 2 % powder   Topical Q1H PRN         naloxone (NARCAN) injection 0.1-0.4 mg  0.1-0.4 mg Intravenous Q2 Min PRN         ondansetron (ZOFRAN-ODT) ODT tab 4 mg  4 mg Oral Q6H PRN        Or     ondansetron (ZOFRAN) injection 4 mg  4 mg Intravenous Q6H PRN         polyethylene glycol (MIRALAX/GLYCOLAX) Packet 17 g  17 g Oral Daily PRN         prochlorperazine (COMPAZINE) injection 5 mg  5 mg Intravenous Q6H PRN   5 mg at 07/04/18 1200    Or     prochlorperazine (COMPAZINE) tablet 5 mg  5 mg Oral Q6H PRN        Or     prochlorperazine (COMPAZINE) Suppository 12.5 mg  12.5 mg Rectal Q12H PRN         senna-docusate (SENOKOT-S;PERICOLACE) 8.6-50 MG per tablet 1 tablet  1 tablet Oral BID PRN        Or     senna-docusate (SENOKOT-S;PERICOLACE) 8.6-50 MG per tablet 2 tablet  2 tablet Oral BID PRN         sodium chloride (PF) 0.9% PF flush 3 mL  3 mL Intracatheter Q1H PRN         sodium chloride (PF) 0.9% PF flush 3 mL  3 mL Intracatheter Q8H         sodium chloride 0.9% infusion   Intravenous Continuous 100 mL/hr at 07/04/18 2159       No current [facility]-ordered outpatient prescriptions on file.       lactated ringers       sodium chloride 100 mL/hr at 07/04/18 2159     Wt Readings from Last 1 Encounters:   07/04/18 127 kg (280 lb)     Temp Readings from Last 1 Encounters:   07/05/18 37.1  C (98.7  F) (Oral)     BP Readings from Last 6 Encounters:   07/05/18 118/62   04/24/18 120/78   04/03/18 139/80   08/15/17 124/70   08/01/17 129/80   07/19/17 109/73     Pulse Readings from Last 4 Encounters:   07/04/18 62   04/24/18 84   04/03/18 83   08/15/17 83     Resp Readings from Last 1 Encounters:   07/05/18 16     SpO2 Readings from Last 1 Encounters:   07/05/18 95%     Recent Labs   Lab Test  07/05/18   0655  07/04/18   0658   NA  141  137   POTASSIUM  4.7  4.9   CHLORIDE  108  104   CO2  24  23   ANIONGAP  9  10   GLC  121*  212*   BUN  22  20   CR  1.25*  1.28*   TELLY  7.4*  9.0     Recent Labs   Lab Test  07/04/18    0658  04/03/18   1559   AST  22  15   ALT  20  16   ALKPHOS  65  66   BILITOTAL  0.5  0.2   LIPASE  218   --      Recent Labs   Lab Test  07/05/18   0655  07/04/18   0658   WBC  9.1  11.5*   HGB  10.9*  13.4   PLT  220  294     No results for input(s): ABO, RH in the last 17652 hours.  Recent Labs   Lab Test  07/19/17   0655  09/21/16   1300   INR  0.93  0.99   PTT  29   --       Recent Labs   Lab Test  08/13/13   0850  08/13/13   0230  08/12/13   2055   TROPI  <0.012  <0.012  0.015     No results for input(s): PH, PCO2, PO2, HCO3 in the last 94698 hours.  No results for input(s): HCG in the last 74643 hours.  Recent Results (from the past 744 hour(s))   CT Abdomen Pelvis w Contrast    Narrative    CT ABDOMEN AND PELVIS WITH CONTRAST   7/4/2018 8:59 AM     HISTORY: Acute onset pain near ventral hernia.     TECHNIQUE: 135mL Isovue-370. Radiation dose for this scan was reduced  using automated exposure control, adjustment of the mA and/or kV  according to patient size, or iterative reconstruction technique.    COMPARISON: CT scan from 9/21/2016.    FINDINGS: Included lung bases are unremarkable.    Mild fatty infiltration of the liver. Previous cholecystectomy. Spleen  and pancreas are unremarkable.    No adrenal lesions. There is increased density in the left renal  pelvis extending into the proximal ureter that is likely contrast.  There is, however, no contrast in the right renal collecting system.  This could be asymmetric contrast excretion. It would be difficult to  exclude some portion of this area of increased density representing  stones, but this has the appearance of contrast. Multiple bilateral  renal cortical cysts. No retroperitoneal adenopathy or evidence of  aortic aneurysm.    Scans through the pelvis demonstrate a normal appearing bladder. No  pelvic adenopathy. No adnexal masses.    Scattered colonic diverticuli without evidence of diverticulitis.  There is a large ventral hernia containing a loop  of transverse colon,  but no evidence of obstruction. No dilated bowel. No evidence of  appendicitis. No free air or free fluid.      Impression    IMPRESSION:  1. Large ventral hernia containing a loop of transverse colon, but no  evidence of obstruction or inflammation.  2. Contrast seen in the left renal pelvis likely due to asymmetric  excretion of contrast.  3. Multiple bilateral renal cortical cysts.  4. No evidence of diverticulitis or appendicitis. No thickened bowel  wall or evidence of obstruction.    ALAYNA TEMPLE MD       RECENT LABS:   EC2018 - NSR, 1st degree AVB  ECHO: TTE  - The aortic arch is not fully imaged but may be mildly dilated at 4.1 cm. The  aortic root is normal size.  Left ventricular systolic function is hyperdynamic and the visual ejection  fraction is estimated at 65-70%. There is mild concentric left ventricular  hypertrophy. there may be an LV outflow gradient but it was not quantitated;  the L:V cavity is small.  Technically difficult, suboptimal study. Contrast was used without apparent  complications. The appearance of the arch was similar in ; a daniela in   suggested the aortic arch to be < 3 cm - the measurment noted here was  technically very difficult and likely included other structures.  Future studies should include LVOT measurements.  _____________________________________________________________________________  __        Left Ventricle  The left ventricle is normal in size. There is mild concentric left  ventricular hypertrophy. A sigmoid septum is present. E by E prime ratio is  between 8 and 15, which is indeterminate for assessment of left ventricular  filling pressures. The visual ejection fraction is estimated at 65-70%. The  transmitral spectral Doppler flow pattern is suggestive of impaired LV  relaxation. No regional wall motion abnormalities noted.     Right Ventricle  The right ventricle is mildly dilated. The right ventricular systolic function  is  normal. The right ventricle is not well visualized.     Atria  Normal left atrial size. Right atrial size is normal. There is no atrial shunt  seen.     Mitral Valve  There is mild to moderate mitral annular calcification. The mitral valve is  not well visualized. There is trace mitral regurgitation.        Tricuspid Valve  The tricuspid valve is not well visualized. Normal IVC (1.5-2.5cm) with >50%  respiratory collapse; right atrial pressure is estimated at 5-10mmHg. There is  trace tricuspid regurgitation. Right ventricular systolic pressure could not  be approximated due to inadequate tricuspid regurgitation.     Aortic Valve  The aortic valve is not well visualized. No aortic regurgitation is present.  No hemodynamically significant valvular aortic stenosis.     Pulmonic Valve  There is no pulmonic valvular stenosis.     Vessels  The aortic root is normal size. The aortic arch is not fully imaged but may be  mildly dilated at 4.1 cm.     Pericardium  There is pericardial thickening and/or a small pericardial effusion.

## 2018-07-05 NOTE — PLAN OF CARE
Problem: Patient Care Overview  Goal: Plan of Care/Patient Progress Review  Outcome: No Change  A&O x 4. VSS on 1L O2 via nasal canula. Denied pain.  and 138. Maintained NPO. Bowel sounds hypoactive. Infusion in progress at 100 ml/hr. Up to bed side commode with stand by assist. Slept well throughout. For recheck Lactic acid this morning. Will continue to monitor.

## 2018-07-05 NOTE — PLAN OF CARE
Problem: Patient Care Overview  Goal: Plan of Care/Patient Progress Review  Outcome: No Change  A/Ox4, VSS on r/a, denies pain.  Weaned from 3L this AM.  Surgery canceled due to Plavix.  MD will round this afternoon and determine ongoing plan.  Up with 1 and walker. Tolerating mod carb diet. , 129.  IVF decreased to 50.    IS improving.  Denies SOB.

## 2018-07-05 NOTE — PROGRESS NOTES
[facility]    Hospitalist Progress Note    Assessment & Plan   Moira Andre is an 84-year-old female with past medical history of diabetes mellitus type 2, obesity, chronic pain, coronary artery disease, asthma as well as CKD stage 3, who presented to the emergency department for evaluation of abdominal discomfort.  The patient has a known ventral hernia which was demonstrated on CT abdomen and pelvis.  However, despite multiple doses of IV pain medication and no evidence of strangulation on CT, patient had ongoing discomfort and thus will be admitted to the Hospitalist Service for further management and monitoring.       1.  Symptomatic ventral hernia.  The patient has a known ventral hernia which has been there for greater than 10 years.  She does report in the last 1-2 years it has been increasing in size but has never caused her pain until this morning.  Pain is nonradiating and nonyielding.  She did not have associated nausea or vomiting.  Last bowel movement was yesterday morning.  Presently, patient is continuing to report pain despite significant sedation secondary to narcotic pain medication.  She will be admitted under inpatient status.    Pt  Seen by general surgery and not that to have incarcerated bowel/serios visceral injury.   Pt had resolution of pain overnight. Lactic acidosis resolved at 1.1. Pt just recently had plavix so elective hernia surgery could not be performed. Pt to follow up with surgery and pcp to arrange for preop and schedule for holding plavix for elective hernia repair  Monitor overnight to ensure tolerating diet and no further pain. Hb drop to 10 range-follow overnight to ensure stable. Pt denies bleeding. Adat.   2.  Acute on chronic kidney injury.  The patient with a creatinine of 1.3, with baseline renal function of approximately 1.1 to 1.2.  The patient did receive IV contrast during her CT scan.  May be due to prerenal azotemia versus contrast-induced  nephropathy.  Holding PTA metformin and lisinopril given increase.  - stable to improved with fluids.   - am bmp    3.  Lactic acidosis.  Lactate elevated to 2.7, received 1L IV fluid in ED. May be due to dehydration given TIFFANI, vitals and labwork not concerning for infectious process. Cannot rule-out possibility of mesenteric ischemia given above. This resolved with aggressive fluid hydration/boluses  No longer having abd pain. Lactate normalized to 1.1    4.  Abnormal UA. UA on admission abnormal with 10 WBCs, small LE, large blood. Patient denies dysuria, frequency or urgency. Ucx grew mixed urogenital tyler    5.  Diabetes mellitus type 2.  Last hemoglobin A1C was 7.0% on 06/28/2018.  Patient prior to admission was maintained on metformin, which will be held given her acute kidney injury.  Good control with ISS and off meds during stay  - cont to hold meds.    - She will be placed on a high insulin sliding scale with Accu-Cheks as per protocol.   6.  Hypertension.  Holding prior to admission lisinopril given acute kidney injury. Well controlled today on just bber.   resumed prior to admission metoprolol.   7.  Hyperlipidemia.  Continue prior to admission atorvastatin.   8.  Coronary artery disease.  On lifelong Plavix secondary to aspirin intolerance.  The patient denies any recent symptoms concerning for cardiac decompensation including shortness of breath or chest discomfort.  She is resumed on her prior to admission beta blocker and statin as above.  Can restart plavix at discharge. Plan to folllow up with pcp 1 week reeval, obtain preop for elective repair of ventral hernia.   9.  Obstructive sleep apnea.  CPAP with home settings.   10.  Asthma, intermittent.  Continue prior to admission inhalers.   11.  Depression.  Continues on prior to admission Cymbalta.   11.  Deep venous thrombosis prophylaxis:  PCDs.       CODE STATUS:  Patient is a full code.   Code Status: Full Code    Disposition: Expected discharge  tomorrow. Will need to trend out Hb.      Tommy Hernandez MD  Text Page  (7am to 6pm)  Interval History   Feeling better. Tolerated diet through the day. No further pain over ventral hernia  Surgery cancelled as pt has to be off plavix for at least 5 days.     -Data reviewed today: I reviewed all new labs and imaging results over the last 24 hours. I personally reviewed labs since admission.     Physical Exam   Temp: 98.8  F (37.1  C) Temp src: Oral BP: 135/62   Heart Rate: 74 Resp: 18 SpO2: 93 % O2 Device: None (Room air) Oxygen Delivery: 1 LPM  Vitals:    07/04/18 0944   Weight: 127 kg (280 lb)     Vital Signs with Ranges  Temp:  [98.4  F (36.9  C)-98.8  F (37.1  C)] 98.8  F (37.1  C)  Heart Rate:  [74-82] 74  Resp:  [16-18] 18  BP: (118-139)/(62-69) 135/62  SpO2:  [93 %-98 %] 93 %  I/O last 3 completed shifts:  In: 1547 [P.O.:788; I.V.:759]  Out: 550 [Urine:550]    Constitutional: Obese, nad, appears comfortable  Respiratory: CTAB  Cardiovascular: RRR no r/g/m  GI: obese, ventral hernia present- NT today. No tenderness in 4 quadrants. Nl BS  Skin/Integumen: no rash  Neuro: grossly nonfocal.   Pysch: nl affect    Medications     sodium chloride 50 mL/hr at 07/05/18 0945       atorvastatin  20 mg Oral At Bedtime     DULoxetine  60 mg Oral Daily     fluticasone-salmeterol  1 puff Inhalation Q12H     insulin aspart  1-7 Units Subcutaneous TID AC     insulin aspart  1-5 Units Subcutaneous At Bedtime     metoprolol succinate  100 mg Oral Daily     sodium chloride (PF)  3 mL Intracatheter Q8H       Data     Recent Labs  Lab 07/05/18  1557 07/05/18  0655 07/04/18  0658   WBC  --  9.1 11.5*   HGB 10.9* 10.9* 13.4   MCV  --  87 85   PLT  --  220 294   NA  --  141 137   POTASSIUM  --  4.7 4.9   CHLORIDE  --  108 104   CO2  --  24 23   BUN  --  22 20   CR  --  1.25* 1.28*   ANIONGAP  --  9 10   TELLY  --  7.4* 9.0   GLC  --  121* 212*   ALBUMIN  --   --  3.8   PROTTOTAL  --   --  8.3   BILITOTAL  --   --  0.5   ALKPHOS  --    --  65   ALT  --   --  20   AST  --   --  22   LIPASE  --   --  218       Imaging:   No results found for this or any previous visit (from the past 24 hour(s)).

## 2018-07-06 VITALS
TEMPERATURE: 98.7 F | RESPIRATION RATE: 16 BRPM | HEART RATE: 62 BPM | HEIGHT: 67 IN | BODY MASS INDEX: 43.95 KG/M2 | SYSTOLIC BLOOD PRESSURE: 169 MMHG | WEIGHT: 280 LBS | OXYGEN SATURATION: 92 % | DIASTOLIC BLOOD PRESSURE: 80 MMHG

## 2018-07-06 LAB
ERYTHROCYTE [DISTWIDTH] IN BLOOD BY AUTOMATED COUNT: 15.6 % (ref 10–15)
FERRITIN SERPL-MCNC: 214 NG/ML (ref 8–252)
GLUCOSE BLDC GLUCOMTR-MCNC: 128 MG/DL (ref 70–99)
GLUCOSE BLDC GLUCOMTR-MCNC: 201 MG/DL (ref 70–99)
HCT VFR BLD AUTO: 37.3 % (ref 35–47)
HGB BLD-MCNC: 11.9 G/DL (ref 11.7–15.7)
MCH RBC QN AUTO: 27.2 PG (ref 26.5–33)
MCHC RBC AUTO-ENTMCNC: 31.9 G/DL (ref 31.5–36.5)
MCV RBC AUTO: 85 FL (ref 78–100)
PLATELET # BLD AUTO: 266 10E9/L (ref 150–450)
RBC # BLD AUTO: 4.38 10E12/L (ref 3.8–5.2)
WBC # BLD AUTO: 9.4 10E9/L (ref 4–11)

## 2018-07-06 PROCEDURE — G0378 HOSPITAL OBSERVATION PER HR: HCPCS

## 2018-07-06 PROCEDURE — 99217 ZZC OBSERVATION CARE DISCHARGE: CPT | Performed by: INTERNAL MEDICINE

## 2018-07-06 PROCEDURE — 36415 COLL VENOUS BLD VENIPUNCTURE: CPT | Performed by: INTERNAL MEDICINE

## 2018-07-06 PROCEDURE — 96372 THER/PROPH/DIAG INJ SC/IM: CPT

## 2018-07-06 PROCEDURE — 85027 COMPLETE CBC AUTOMATED: CPT | Performed by: INTERNAL MEDICINE

## 2018-07-06 PROCEDURE — 99231 SBSQ HOSP IP/OBS SF/LOW 25: CPT | Performed by: SURGERY

## 2018-07-06 PROCEDURE — 25000132 ZZH RX MED GY IP 250 OP 250 PS 637: Performed by: PHYSICIAN ASSISTANT

## 2018-07-06 PROCEDURE — 00000146 ZZHCL STATISTIC GLUCOSE BY METER IP

## 2018-07-06 PROCEDURE — 82728 ASSAY OF FERRITIN: CPT | Performed by: INTERNAL MEDICINE

## 2018-07-06 RX ADMIN — FLUTICASONE PROPIONATE AND SALMETEROL 1 PUFF: 50; 100 POWDER RESPIRATORY (INHALATION) at 08:50

## 2018-07-06 RX ADMIN — DULOXETINE HYDROCHLORIDE 60 MG: 60 CAPSULE, DELAYED RELEASE ORAL at 08:50

## 2018-07-06 RX ADMIN — METOPROLOL SUCCINATE 100 MG: 100 TABLET, EXTENDED RELEASE ORAL at 08:49

## 2018-07-06 NOTE — DISCHARGE SUMMARY
[facility]    Discharge Summary  Hospitalist    Date of Admission:  7/4/2018  Date of Discharge:  7/6/2018  Discharging Provider: Tommy Hernandez MD    Discharge Diagnoses   Symptomatic ventral hernia. Resolved.     History of Present Illness     Moira Andre is an 84-year-old female with past medical history of diabetes mellitus type 2, obesity, chronic pain, coronary artery disease, asthma as well as CKD stage 3, who presented to the emergency department for evaluation of abdominal discomfort.  The patient has a known ventral hernia which was demonstrated on CT abdomen and pelvis.  However, despite multiple doses of IV pain medication and no evidence of strangulation on CT, patient had ongoing discomfort and thus will be admitted to the Hospitalist Service for further management and monitoring.       Hospital Course   Moira Andre was admitted on 7/4/2018.  The following problems were addressed during her hospitalization:    Principal Problem:    Ventral hernia    1.  Symptomatic ventral hernia.  The patient has a known ventral hernia which has been there for greater than 10 years.  She does report in the last 1-2 years it has been increasing in size but has never caused her pain until this morning.  Pain is nonradiating and nonyielding.  She did not have associated nausea or vomiting.  Last bowel movement wasmorning PTA- normal.  Presently, patient is continuing to report pain despite significant sedation secondary to narcotic pain medication.  She will be admitted under inpatient status.    Pt  Seen by general surgery and not that to have incarcerated bowel/serios visceral injury.   Pt had resolution of pain overnight. Lactic acidosis resolved at 1.1. Pt just recently had plavix so elective hernia surgery could not be performed. Pt to follow up with surgery and pcp to arrange for preop and schedule for holding plavix for elective hernia repair  Pt diet advanced to nl diet which she  tolerated well. She will dc home with follow up with general surgeon to rediscuss elective repair which would be an extensive surgery. On day of discharge, nl vitals. NT abdomen. No clear tenderness over ventral hernia.  She will restart plavix and outpatient meds. Close f/u PCP within week.     2.  Acute on chronic kidney injury.  The patient with a creatinine of 1.3, with baseline renal function of approximately 1.1 to 1.2.  The patient did receive IV contrast during her CT scan.  May be due to prerenal azotemia versus contrast-induced nephropathy.  Holding PTA metformin and lisinopril given increase.  At baseline renal function at time of discharge. May resume metformin tomorrow. Restart ace inhibitor., bmp lab test in clinic.      3.  Lactic acidosis.  Lactate elevated to 2.7, received 1L IV fluid in ED. likely due to dehydration given TIFFANI, vitals and labwork not concerning for infectious process. Ultimately not thought 2/2 bowel ischemia. This resolved with aggressive fluid hydration/boluses  No longer having abd pain. Lactate normalized to 1.1     4.  Abnormal UA. UA on admission abnormal with 10 WBCs, small LE, large blood. Patient denies dysuria, frequency or urgency. Ucx grew mixed urogenital tyler. No uti sxs at discharge.      5.  Diabetes mellitus type 2.  Last hemoglobin A1C was 7.0% on 06/28/2018.  Patient prior to admission was maintained on metformin, which will be held given her acute kidney injury.  Good control with ISS and off meds during stay  - held meds during stay.    -  placed on a high insulin sliding scale with Accu-Cheks as per protocol.   -resume metformin at discharge. Hold if feeling unwell at home with obstructive sxs in future.   6.  Hypertension.  Holding prior to admission lisinopril given acute kidney injury. Continued on bber during stay. Pt will restart ace inh and continue with bber at time of discharge.   7.  Hyperlipidemia.  Continue prior to admission atorvastatin.   8.   Coronary artery disease.  On lifelong Plavix secondary to aspirin intolerance.  The patient denies any recent symptoms concerning for cardiac decompensation including shortness of breath or chest discomfort.  She is resumed on her prior to admission beta blocker and statin as above.  Can restart plavix at discharge. Plan to folllow up with pcp 1 week reeval, obtain preop for elective repair of ventral hernia. PcP and surgeon to decide in future on time of plavix holding should surgery be performed electively in future. Discussed with patient  9.  Obstructive sleep apnea.  CPAP with home settings. On cpap during stay  10.  Asthma, intermittent.  Continue prior to admission inhalers. No flair on exam or by sxs  11.  Depression.  Continues on prior to admission Cymbalta.   11.  Deep venous thrombosis prophylaxis:  PCDs.       CODE STATUS:  Patient is a full code.        Disposition: discharge to home      Tommy Hernandez MD    Significant Results and Procedures   See hpi    Pending Results   none  Unresulted Labs Ordered in the Past 30 Days of this Admission     No orders found from 5/5/2018 to 7/5/2018.          Code Status   Full Code       Primary Care Physician   Maryan Churchill    Physical Exam   Temp: 98.7  F (37.1  C) Temp src: Oral BP: 169/80   Heart Rate: 87 Resp: 16 SpO2: 92 % O2 Device: None (Room air)    Vitals:    07/04/18 0944   Weight: 127 kg (280 lb)     Vital Signs with Ranges  Temp:  [98.6  F (37  C)-98.8  F (37.1  C)] 98.7  F (37.1  C)  Heart Rate:  [74-87] 87  Resp:  [16-18] 16  BP: (135-172)/(62-81) 169/80  SpO2:  [92 %-94 %] 92 %  I/O last 3 completed shifts:  In: 1536 [P.O.:1088; I.V.:448]  Out: -     Constitutional: nad, appears comfortable sitting up in chair  Eyes: nl sclera  Respiratory: CTAB  Cardiovascular: RRR no r/g/m  GI: soft, obese, nl BS, nt, ventral hernia noted- minimal discomfort with palpation  Skin: no rash. Iv site nl  Musculoskeletal: no focal joint swelling or  jason  Neurologic: grossly nonfocal, nl speech and mentation  Neuropsychiatric: nl affect    Discharge Disposition   Discharged to home  Condition at discharge: Good    Consultations This Hospital Stay   SURGERY GENERAL IP CONSULT  CARE TRANSITION RN/SW IP CONSULT    Time Spent on this Encounter   ITommy, personally saw the patient today and spent less than or equal to 30 minutes discharging this patient.    Discharge Orders     Reason for your hospital stay   Symptomatic ventral hernia     Follow-up and recommended labs and tests    1. See primary care provider or partner within the week and again for preoperative evaluation if decide in future to proceed with ventral hernia repair surgery.  2. See surgeon as discussed in several weeks to follow up ventral hernia     Activity   Your activity upon discharge: as tolerated     Discharge Instructions   See follow up instructions. Resume plavix today at home.     Full Code     Diet   Follow this diet upon discharge: 2000mg per day low sodium diet and diabetic diet. Low fat diet       Discharge Medications   Current Discharge Medication List      CONTINUE these medications which have NOT CHANGED    Details   acetaminophen (TYLENOL) 325 MG tablet Take 325-650 mg by mouth every 6 hours as needed for mild pain      ADVAIR DISKUS 100-50 MCG/DOSE diskus inhaler INHALE 1 PUFF EVERY 12 HOURS  Qty: 3 Inhaler, Refills: 3    Associated Diagnoses: Intermittent asthma, uncomplicated      albuterol (PROAIR HFA/PROVENTIL HFA/VENTOLIN HFA) 108 (90 BASE) MCG/ACT Inhaler Inhale 2 puffs into the lungs every 6 hours as needed for shortness of breath / dyspnea  Qty: 1 Inhaler, Refills: 3    Associated Diagnoses: Intermittent asthma, uncomplicated      atorvastatin (LIPITOR) 20 MG tablet Take 1 tablet (20 mg) by mouth daily  Qty: 90 tablet, Refills: 3    Associated Diagnoses: Hyperlipidemia LDL goal <70      Cholecalciferol (VITAMIN D) 2000 UNITS CAPS Take by mouth daily       clopidogrel (PLAVIX) 75 MG tablet Take 1 tablet (75 mg) by mouth daily  Qty: 90 tablet, Refills: 3    Comments: Profile Rx: patient will contact pharmacy when needed  Associated Diagnoses: Coronary artery disease involving native coronary artery of native heart without angina pectoris      DULoxetine (CYMBALTA) 60 MG EC capsule TAKE 1 CAPSULE EVERY DAY  (DOSE  INCREASE)  Qty: 90 capsule, Refills: 1    Associated Diagnoses: Moderate major depression (H)      fluticasone (FLONASE) 50 MCG/ACT nasal spray Spray 1 spray into both nostrils as needed       HYDROcodone-acetaminophen (NORCO) 5-325 MG per tablet Take 1 tablet by mouth every 8 hours as needed for moderate to severe pain  Qty: 90 tablet, Refills: 0    Associated Diagnoses: Multiple joint pain      IBANdronate (BONIVA) 150 MG tablet Take 150 mg by mouth every 30 days Patient takes on the first day of each month.      lisinopril (PRINIVIL/ZESTRIL) 10 MG tablet Take 1 tablet (10 mg) by mouth daily  Qty: 90 tablet, Refills: 1    Associated Diagnoses: Essential hypertension      Menthol, Topical Analgesic, (ICY HOT EX) Apply to affected area every morning      metoprolol succinate (TOPROL-XL) 100 MG 24 hr tablet TAKE 1 TABLET EVERY DAY  (DUE  FOR  OFFICE  VISIT, PLEASE MAKE APPT.)  Qty: 90 tablet, Refills: 3    Associated Diagnoses: Essential hypertension      Multiple Vitamins-Minerals (HAIR SKIN AND NAILS FORMULA PO) Take by mouth daily      neomycin-polymyxin-hydrocortisone (CORTISPORIN) otic solution Place 4 drops into both ears 4 times daily  Qty: 10 mL, Refills: 0    Associated Diagnoses: Left ear pain      Nitroglycerin (NITROQUICK SL) Place 0.4 mg under the tongue every 5 minutes as needed       nystatin-triamcinolone (MYCOLOG II) cream APPLY TOPICALLY TWICE DAILY  Qty: 60 g, Refills: 1    Associated Diagnoses: Tinea of the body      blood glucose monitoring (NO BRAND SPECIFIED) meter device kit Use to test blood sugar 1-2 times daily or as directed.  Qty:  1 kit, Refills: 0    Comments: [employer] True Metrix Air Meter  Associated Diagnoses: Type 2 diabetes mellitus with diabetic nephropathy (H)      blood glucose monitoring (TRUE METRIX BLOOD GLUCOSE TEST) test strip 1 strip by In Vitro route daily  Qty: 200 strip, Refills: 3    Associated Diagnoses: Type 2 diabetes mellitus with diabetic nephropathy, without long-term current use of insulin (H)      Blood Glucose Monitoring Suppl (TRUE METRIX AIR GLUCOSE METER) W/DEVICE KIT USE AS DIRECTED  Qty: 1 kit, Refills: 0    Associated Diagnoses: Type 2 diabetes mellitus with diabetic nephropathy (H)      metFORMIN (GLUCOPHAGE-XR) 500 MG 24 hr tablet Take 2 tablets (1,000 mg) by mouth daily (with dinner)  Qty: 180 tablet, Refills: 1    Associated Diagnoses: Type 2 diabetes mellitus with diabetic nephropathy, without long-term current use of insulin (H)      order for DME DREAMSTATION  5-15 CM/H20  NASAL WISP FABRIC      TRUEPLUS LANCETS 28G MISC 1 each 2 times daily as needed  Qty: 100 each, Refills: 3    Comments: Trueplus Super Think 28g lancets  Associated Diagnoses: Type 2 diabetes mellitus with diabetic nephropathy (H)           Allergies   Allergies   Allergen Reactions     Aspirin Hives     Reaction occurred during childhood.      Minocycline      Yellow Dye Allergy. Minocycline has Yellow Dye #10.     Yellow Dye Hives     Rxn to yellow tablet. Eyes swelled shut.      Data   Most Recent 3 CBC's:  Recent Labs   Lab Test  07/06/18   0848  07/05/18   1557  07/05/18   0655  07/04/18   0658   WBC  9.4   --   9.1  11.5*   HGB  11.9  10.9*  10.9*  13.4   MCV  85   --   87  85   PLT  266   --   220  294      Most Recent 3 BMP's:  Recent Labs   Lab Test  07/05/18   0655  07/04/18   0658  04/03/18   1559   NA  141  137  140   POTASSIUM  4.7  4.9  4.3   CHLORIDE  108  104  106   CO2  24  23  25   BUN  22  20  24   CR  1.25*  1.28*  1.18*   ANIONGAP  9  10  9   TELLY  7.4*  9.0  8.9   GLC  121*  212*  145*     Most Recent 2  LFT's:  Recent Labs   Lab Test  07/04/18   0658  04/03/18   1559   AST  22  15   ALT  20  16   ALKPHOS  65  66   BILITOTAL  0.5  0.2     Most Recent INR's and Anticoagulation Dosing History:  Anticoagulation Dose History     Recent Dosing and Labs Latest Ref Rng & Units 7/21/2007 9/25/2007 10/8/2007 10/12/2007 8/13/2013 9/21/2016 7/19/2017    INR 0.86 - 1.14 0.94 1.01 1.09 1.06 1.03 0.99 0.93        Most Recent 3 Troponin's:  Recent Labs   Lab Test  08/13/13   0850  08/13/13   0230  08/12/13   2055   TROPI  <0.012  <0.012  0.015     Most Recent Cholesterol Panel:  Recent Labs   Lab Test  04/03/18   1559   CHOL  174   LDL  74   HDL  44*   TRIG  279*     Most Recent 6 Bacteria Isolates From Any Culture (See EPIC Reports for Culture Details):  Recent Labs   Lab Test  07/04/18   0918   CULT  >100,000 colonies/mL  mixed urogenital tyler       Most Recent TSH, T4 and A1c Labs:  Recent Labs   Lab Test  07/04/18   0658  04/03/18   1559   TSH   --   2.11   A1C  7.6*  7.2*     Results for orders placed or performed during the hospital encounter of 07/04/18   CT Abdomen Pelvis w Contrast    Narrative    CT ABDOMEN AND PELVIS WITH CONTRAST   7/4/2018 8:59 AM     HISTORY: Acute onset pain near ventral hernia.     TECHNIQUE: 135mL Isovue-370. Radiation dose for this scan was reduced  using automated exposure control, adjustment of the mA and/or kV  according to patient size, or iterative reconstruction technique.    COMPARISON: CT scan from 9/21/2016.    FINDINGS: Included lung bases are unremarkable.    Mild fatty infiltration of the liver. Previous cholecystectomy. Spleen  and pancreas are unremarkable.    No adrenal lesions. There is increased density in the left renal  pelvis extending into the proximal ureter that is likely contrast.  There is, however, no contrast in the right renal collecting system.  This could be asymmetric contrast excretion. It would be difficult to  exclude some portion of this area of increased  density representing  stones, but this has the appearance of contrast. Multiple bilateral  renal cortical cysts. No retroperitoneal adenopathy or evidence of  aortic aneurysm.    Scans through the pelvis demonstrate a normal appearing bladder. No  pelvic adenopathy. No adnexal masses.    Scattered colonic diverticuli without evidence of diverticulitis.  There is a large ventral hernia containing a loop of transverse colon,  but no evidence of obstruction. No dilated bowel. No evidence of  appendicitis. No free air or free fluid.      Impression    IMPRESSION:  1. Large ventral hernia containing a loop of transverse colon, but no  evidence of obstruction or inflammation.  2. Contrast seen in the left renal pelvis likely due to asymmetric  excretion of contrast.  3. Multiple bilateral renal cortical cysts.  4. No evidence of diverticulitis or appendicitis. No thickened bowel  wall or evidence of obstruction.    ALAYNA TEMPLE MD

## 2018-07-06 NOTE — DISCHARGE INSTRUCTIONS
1. Obtain these lab tests in clinic with primary care provider next week: BMP kidney test and CBC with platelet count.

## 2018-07-06 NOTE — PLAN OF CARE
Problem: Patient Care Overview  Goal: Plan of Care/Patient Progress Review  Outcome: Improving  A&O x 4. VSS on room air. Denied pain. . IV SL.  Up to the bathroom with stand by assist. Voided. Slept well throughout. For possible discharge today. Will continue to monitor.

## 2018-07-06 NOTE — PLAN OF CARE
Problem: Patient Care Overview  Goal: Plan of Care/Patient Progress Review  Outcome: Improving  Denies pain.  Tolerated regular mod carb diet.  Up to bathroom; BM this evening.  Ambulated hutchinson with BEATRIZ CASTELLANOS, states she has a small apartment and does not walk very far.  Room air sat 94%.  BG's 103, 128.  Plan is for discharge tomorrow if continues to be pain free.

## 2018-07-06 NOTE — PROGRESS NOTES
Surgery    Patient up in chair.  Tolerating diet and had a bowel movement.  No pain whatsoever.    Abdomen-soft.  Hernia palpated with no tenderness.  No skin changes.    A/P  No complaints today.  She has been tolerating a diet and has normal bowel function.  We discussed options regarding her hernia and she has elected for D/C home today.  She will follow-up as an outpatient for further discussion.  I advised the patient that if she develops any pain, increased tenderness around her hernia, or any other complaints associated with incarceratation that she should come to the ER immediately.  She agreed.    Humberto Márquez M.D.  [facility]  [phone]

## 2018-07-09 ENCOUNTER — OFFICE VISIT (OUTPATIENT)
Dept: FAMILY MEDICINE | Facility: CLINIC | Age: 83
End: 2018-07-09
Payer: COMMERCIAL

## 2018-07-09 ENCOUNTER — TELEPHONE (OUTPATIENT)
Dept: FAMILY MEDICINE | Facility: CLINIC | Age: 83
End: 2018-07-09

## 2018-07-09 VITALS
HEIGHT: 67 IN | TEMPERATURE: 98.2 F | HEART RATE: 76 BPM | WEIGHT: 293 LBS | SYSTOLIC BLOOD PRESSURE: 144 MMHG | BODY MASS INDEX: 45.99 KG/M2 | OXYGEN SATURATION: 95 % | DIASTOLIC BLOOD PRESSURE: 80 MMHG

## 2018-07-09 DIAGNOSIS — K43.9 VENTRAL HERNIA WITHOUT OBSTRUCTION OR GANGRENE: Primary | ICD-10-CM

## 2018-07-09 DIAGNOSIS — R60.0 EDEMA OF LOWER EXTREMITY: ICD-10-CM

## 2018-07-09 DIAGNOSIS — F32.1 MODERATE MAJOR DEPRESSION (H): ICD-10-CM

## 2018-07-09 DIAGNOSIS — J45.20 MILD INTERMITTENT ASTHMA WITHOUT COMPLICATION: ICD-10-CM

## 2018-07-09 DIAGNOSIS — I10 ESSENTIAL HYPERTENSION: ICD-10-CM

## 2018-07-09 DIAGNOSIS — R05.8 DRY COUGH: ICD-10-CM

## 2018-07-09 DIAGNOSIS — E66.01 MORBID OBESITY (H): ICD-10-CM

## 2018-07-09 DIAGNOSIS — Z01.818 PREOP GENERAL PHYSICAL EXAM: ICD-10-CM

## 2018-07-09 DIAGNOSIS — E11.21 TYPE 2 DIABETES MELLITUS WITH DIABETIC NEPHROPATHY, WITHOUT LONG-TERM CURRENT USE OF INSULIN (H): ICD-10-CM

## 2018-07-09 DIAGNOSIS — G47.33 OSA ON CPAP: ICD-10-CM

## 2018-07-09 PROCEDURE — 99495 TRANSJ CARE MGMT MOD F2F 14D: CPT | Performed by: INTERNAL MEDICINE

## 2018-07-09 PROCEDURE — 99207 C FOOT EXAM  NO CHARGE: CPT | Mod: 25 | Performed by: INTERNAL MEDICINE

## 2018-07-09 RX ORDER — LISINOPRIL 20 MG/1
20 TABLET ORAL DAILY
Qty: 90 TABLET | Refills: 0 | Status: ON HOLD | OUTPATIENT
Start: 2018-07-09 | End: 2018-08-24

## 2018-07-09 RX ORDER — FUROSEMIDE 20 MG
20 TABLET ORAL DAILY
Qty: 15 TABLET | Refills: 0 | Status: SHIPPED | OUTPATIENT
Start: 2018-07-09 | End: 2018-08-06

## 2018-07-09 RX ORDER — METFORMIN HCL 500 MG
1000 TABLET, EXTENDED RELEASE 24 HR ORAL
Qty: 180 TABLET | Refills: 1 | Status: SHIPPED | OUTPATIENT
Start: 2018-07-09 | End: 2018-08-31 | Stop reason: SINTOL

## 2018-07-09 NOTE — TELEPHONE ENCOUNTER
ED/Discharge Protocol    Patient was already seen today for hospital f/u appt  Maria R MONCADA RN

## 2018-07-09 NOTE — PROGRESS NOTES
SUBJECTIVE:   Moira Andre is a 84 year old female who presents to clinic today for the following health issues:    Hospital Follow-up Visit:    Hospital/Nursing Home/IP Rehab Facility: [facility]  /Date of Admission: 7/4/18  Date of Discharge: 7/6/18    Reason(s) for Admission: Symptomatic ventral hernia. Resolved.            Problems taking medications regularly:  None       Medication changes since discharge: None       Problems adhering to non-medication therapy:  None    Summary of hospitalization:  [facility] discharge summary reviewed  Diagnostic Tests/Treatments reviewed.  Follow up needed: PCP and general surgery  Other Healthcare Providers Involved in Patient s Care:         Surgical follow-up appointment - General surgery for ventral hernia  Update since discharge: improved.   Post Discharge Medication Reconciliation: discharge medications reconciled and changed, per note/orders (see AVS).  Plan of care communicated with patient     Coding guidelines for this visit:  Type of Medical   Decision Making Face-to-Face Visit       within 7 Days of discharge Face-to-Face Visit        within 14 days of discharge   Moderate Complexity [id number]   High Complexity [id number]            Depression  Compliant with medication  Has been crying a lot lately  Reports there is no reason for her to cry excessively    Fatigue  Doesn't always use her CPAP    Problem list and histories reviewed & adjusted, as indicated.  Additional history: as documented    Medications and Labs reviewed in EPIC    Reviewed and updated as needed this visit by clinical staff  Tobacco  Allergies  Meds       Reviewed and updated as needed this visit by Provider         ROS:  Constitutional, HEENT, cardiovascular, pulmonary, GI, , musculoskeletal, neuro, skin, endocrine and psych systems are negative, except as otherwise noted.    This document serves as a record of the services and decisions personally performed  "and made by Maryan Churchill MD. It was created on her behalf by Jeanette Ha, a trained medical scribe. The creation of this document is based on the provider's statements to the medical scribe.  Jeanette Ha 1:42 PM July 9, 2018    OBJECTIVE:     /80  Pulse 76  Temp 98.2  F (36.8  C) (Tympanic)  Ht 5' 7\" (1.702 m)  Wt 296 lb (134.3 kg)  SpO2 95%  BMI 46.36 kg/m2  Body mass index is 46.36 kg/(m^2).    GENERAL: uses assisted walker, morbidly obese, alert and no distressEYES: Eyes grossly normal to inspection, PERRL and conjunctivae and sclerae normal  HENT: ear canals and TM's normal and nose and mouth without ulcers or lesions  NECK: no adenopathy  RESP: lungs clear to auscultation - no rales, rhonchi or wheezes  CV: regular rates and rhythm, normal S1 S2, no S3  PSYCH: mentation appears normal, affect normal/bright  Diabetic foot exam: shows dry skin and nail changes DP and PT pulses palpable and has edema of feet.  She has huge ventral hernia  reducible  Diagnostic Test Results:  none     Reviewed and discussed CT abdomen pelvis done on 07/04/18  ASSESSMENT/PLAN:   Moira was seen today for hospital f/u.    Diagnoses and all orders for this visit:    Ventral hernia without obstruction or gangrene  Comments:  seen on CT dated 7/4  Patient was admitted to [facility] on 07/04/18 for abdominal pain due to ventral hernia  She was treated and discharged on 07/06/18  Hernia was seen on CT done on 07/4/18  Abdominal pain has since resolved  She was advised to make appointment with general surgery for repair of hernia  She will schedule appointment at her convenience  She is not ready for surgery but will do it in future .    Type 2 diabetes mellitus with diabetic nephropathy, without long-term current use of insulin (H)  -     metFORMIN (GLUCOPHAGE-XR) 500 MG 24 hr tablet; Take 2 tablets (1,000 mg) by mouth daily (with dinner)  -     C FOOT EXAM  NO CHARGE  Doing well  Compliant with medication  Foot " exam was perfomed  Lab Results   Component Value Date    A1C 7.6 07/04/2018    A1C 7.2 04/03/2018    A1C 6.6 08/15/2017    A1C 6.7 04/11/2017    A1C 6.7 12/16/2016     Moderate major depression (H)  Compliant with medication  However, reports feeling down lately    Mild intermittent asthma without complication  Stable  Compliant with medication    ELIGIO on CPAP  Hasn't been using CPAP nightly  Educated her about dangers of ELIGIO  Advised her to use it nightly    Essential hypertension  -     lisinopril (PRINIVIL/ZESTRIL) 20 MG tablet; Take 1 tablet (20 mg) by mouth daily  BP was high in office visit today  Increased dose of lisinopril  Advised her to check BP at home and report readings higher than 140/90    Edema of lower extremity  -     furosemide (LASIX) 20 MG tablet; Take 1 tablet (20 mg) by mouth daily  Reports hospital stay made her gain weight due to increased edema  Prescribed furosemide  Advised her to take it for a couple of days until edema subsides  Then advised her to use it only as needed    Dry cough  -     ranitidine (ZANTAC) 150 MG tablet; Take 1 tablet (150 mg) by mouth 2 times daily  Patient reports dry cough  Prescribed ranitidine to be used daily  She will let me know how it works for her    Preop general physical exam  Advised patient to schedule appointment once she has scheduled surgery for her ventral hernia    Morbid obesity (H)  Discussed healthy eating and exercise habits    Other orders  -     Cancel: Random Glucose; Future    Patient Instructions   We increased dose of lisinopril to 20 mg daily  Take 2 tablets of 10 mg daily until you run out  Patient is advised to monitor her blood pressure once or twice a month at home.  Bring those readings on your next visit.  Notify us if your blood pressure readings consistently stays greater than 140/90.  Take furesomide (water pill) for a few days until swelling goes does  Then, take it only as needed  Use Zantac twice daily for your cough  Check  with our pharmacy located in [address] about your insurance company coverage for new Shingles vaccine, which is now available  It's called SHINGRIX and is a series of two shots, 2-6 months apart  It is considered more than 90% effective  Schedule an appointment for pre-op evaluation once you decide for your surgery  Seek sooner medical attention if there is any worsening of symptoms or problems  Follow up in 4 weeks  Seek sooner medical attention if there is any worsening of symptoms or problems.  The information in this document, created by the medical scribe for me, accurately reflects the services I personally performed and the decisions made by me. I have reviewed and approved this document for accuracy prior to leaving the patient care area.  July 9, 2018 2:09 PM    Maryan Churchill MD  [facility]

## 2018-07-09 NOTE — PATIENT INSTRUCTIONS
We increased dose of lisinopril to 20 mg daily  Take 2 tablets of 10 mg daily until you run out  Patient is advised to monitor her blood pressure once or twice a month at home.  Bring those readings on your next visit.  Notify us if your blood pressure readings consistently stays greater than 140/90.  Take furesomide (water pill) for a few days until swelling goes does  Then, take it only as needed  Use Zantac twice daily for your cough  Check with our pharmacy located in [address] about your insurance company coverage for new Shingles vaccine, which is now available  It's called SHINGRIX and is a series of two shots, 2-6 months apart  It is considered more than 90% effective  Schedule an appointment for pre-op evaluation once you decide for your surgery  Seek sooner medical attention if there is any worsening of symptoms or problems    Follow up in 4 weeks  Seek sooner medical attention if there is any worsening of symptoms or problems.

## 2018-07-09 NOTE — MR AVS SNAPSHOT
After Visit Summary   7/9/2018    Moira Andre    MRN: [id number]           Patient Information     Date Of Birth          9/24/1933        Visit Information        Provider Department      7/9/2018 1:30 PM Maryan Churchill MD [facility]        Today's Diagnoses     Ventral hernia without obstruction or gangrene    -  1    Type 2 diabetes mellitus with diabetic nephropathy, without long-term current use of insulin (H)        Moderate major depression (H)        Mild intermittent asthma without complication        ELIGIO on CPAP        Essential hypertension        Edema of lower extremity        Dry cough        Preop general physical exam        Morbid obesity (H)          Care Instructions    We increased dose of lisinopril to 20 mg daily  Take 2 tablets of 10 mg daily until you run out  Patient is advised to monitor her blood pressure once or twice a month at home.  Bring those readings on your next visit.  Notify us if your blood pressure readings consistently stays greater than 140/90.  Take furesomide (water pill) for a few days until swelling goes does  Then, take it only as needed  Use Zantac twice daily for your cough  Check with our pharmacy located in [address] about your insurance company coverage for new Shingles vaccine, which is now available  It's called SHINGRIX and is a series of two shots, 2-6 months apart  It is considered more than 90% effective  Schedule an appointment for pre-op evaluation once you decide for your surgery  Seek sooner medical attention if there is any worsening of symptoms or problems    Follow up in 4 weeks  Seek sooner medical attention if there is any worsening of symptoms or problems.                Follow-ups after your visit        Your next 10 appointments already scheduled     Sep 17, 2018 10:00 AM CDT   Ech Complete with [id number]   [facility]chocardi[facility] ([facility])    [address]  "[facility]  [address]  [facility] [id number] [phone]           1. Please bring or wear a comfortable two-piece outfit. 2. You may eat, drink and take your normal medicines. 3. For any questions that cannot be answered, please contact the ordering physician            Sep 17, 2018  1:15 PM CDT   Return Visit with Jozef Sinha MD   [facility] ([facility])    [address]  [facility] [id number] [phone] OPT 2              Who to contact     If you have questions or need follow up information about today's clinic visit or your schedule please contact [facility] directly at [phone].  Normal or non-critical lab and imaging results will be communicated to you by Munchkinhart, letter or phone within 4 business days after the clinic has received the results. If you do not hear from us within 7 days, please contact the clinic through MyChart or phone. If you have a critical or abnormal lab result, we will notify you by phone as soon as possible.  Submit refill requests through [employer] or call your pharmacy and they will forward the refill request to us. Please allow 3 business days for your refill to be completed.          Additional Information About Your Visit        [employer] Information     [employer] lets you send messages to your doctor, view your test results, renew your prescriptions, schedule appointments and more. To sign up, go to [email or web address] . Click on \"Log in\" on the left side of the screen, which will take you to the Welcome page. Then click on \"Sign up Now\" on the right side of the page.     You will be asked to enter the access code listed below, as well as some personal information. Please follow the directions to create your username and password.     Your access code is: [email or web address]  Expires: 10/3/2018  9:43 AM     Your access code will  in 90 days. If you need help or a new code, please call your " "[facility] or [phone].        Care EveryWhere ID     This is your Care EveryWhere ID. This could be used by other organizations to access your [place] medical records  [id number]        Your Vitals Were     Pulse Temperature Height Pulse Oximetry BMI (Body Mass Index)       76 98.2  F (36.8  C) (Tympanic) 5' 7\" (1.702 m) 95% 46.36 kg/m2        Blood Pressure from Last 3 Encounters:   07/09/18 144/80   07/06/18 169/80   04/24/18 120/78    Weight from Last 3 Encounters:   07/09/18 296 lb (134.3 kg)   07/04/18 280 lb (127 kg)   04/24/18 280 lb (127 kg)              We Performed the Following     C FOOT EXAM  NO CHARGE          Today's Medication Changes          These changes are accurate as of 7/9/18  2:06 PM.  If you have any questions, ask your nurse or doctor.               Start taking these medicines.        Dose/Directions    furosemide 20 MG tablet   Commonly known as:  LASIX   Used for:  Edema of lower extremity   Started by:  Maryan Churchill MD        Dose:  20 mg   Take 1 tablet (20 mg) by mouth daily   Quantity:  15 tablet   Refills:  0       ranitidine 150 MG tablet   Commonly known as:  ZANTAC   Used for:  Dry cough   Started by:  Maryan Churchill MD        Dose:  150 mg   Take 1 tablet (150 mg) by mouth 2 times daily   Quantity:  60 tablet   Refills:  1         These medicines have changed or have updated prescriptions.        Dose/Directions    lisinopril 20 MG tablet   Commonly known as:  PRINIVIL/ZESTRIL   This may have changed:    - medication strength  - how much to take   Used for:  Essential hypertension   Changed by:  Maryan Churchill MD        Dose:  20 mg   Take 1 tablet (20 mg) by mouth daily   Quantity:  90 tablet   Refills:  0            Where to get your medicines      These medications were sent to [facility] - [place], [place] - [address]  [address], [facility] [address]     Phone:  [phone]     lisinopril 20 MG tablet    metFORMIN " 500 MG 24 hr tablet         These medications were sent to [id number] - [place], [place] - [address]  [address], [facility] [address]     Phone:  [phone]     furosemide 20 MG tablet    ranitidine 150 MG tablet                Primary Care Provider Office Phone # Fax #    Maryan ALLRED MD [place] [phone] [address]  [facility] [address]        Equal Access to Services     [facility]: Hadii aad ku hadasho Soomaali, waaxda luqadaha, qaybta kaalmada adeegyada, waxay idiin hayaan adeeg kharash lamarielle . So [facility] [phone].    ATENCIÓN: Si habla español, tiene a gold disposición servicios gratuitos de asistencia lingüística. [facility] [phone].    We comply with applicable federal civil rights laws and [place] laws. We do not discriminate on the basis of race, color, national origin, age, disability, sex, sexual orientation, or gender identity.            Thank you!     Thank you for choosing [facility]  for your care. Our goal is always to provide you with excellent care. Hearing back from our patients is one way we can continue to improve our services. Please take a few minutes to complete the written survey that you may receive in the mail after your visit with us. Thank you!             Your Updated Medication List - Protect others around you: Learn how to safely use, store and throw away your medicines at www.disposemymeds.org.          This list is accurate as of 7/9/18  2:06 PM.  Always use your most recent med list.                   Brand Name Dispense Instructions for use Diagnosis    acetaminophen 325 MG tablet    TYLENOL     Take 325-650 mg by mouth every 6 hours as needed for mild pain        ADVAIR DISKUS 100-50 MCG/DOSE diskus inhaler   Generic drug:  fluticasone-salmeterol     3 Inhaler    INHALE 1 PUFF EVERY 12 HOURS    Intermittent asthma, uncomplicated       albuterol 108 (90 Base) MCG/ACT Inhaler    PROAIR  HFA/PROVENTIL HFA/VENTOLIN HFA    1 Inhaler    Inhale 2 puffs into the lungs every 6 hours as needed for shortness of breath / dyspnea    Intermittent asthma, uncomplicated       atorvastatin 20 MG tablet    LIPITOR    90 tablet    Take 1 tablet (20 mg) by mouth daily    Hyperlipidemia LDL goal <70       * blood glucose monitoring meter device kit    no brand specified    1 kit    Use to test blood sugar 1-2 times daily or as directed.    Type 2 diabetes mellitus with diabetic nephropathy (H)       * TRUE METRIX AIR GLUCOSE METER w/Device Kit     1 kit    USE AS DIRECTED    Type 2 diabetes mellitus with diabetic nephropathy (H)       blood glucose monitoring test strip    TRUE METRIX BLOOD GLUCOSE TEST    200 strip    1 strip by In Vitro route daily    Type 2 diabetes mellitus with diabetic nephropathy, without long-term current use of insulin (H)       clopidogrel 75 MG tablet    PLAVIX    90 tablet    Take 1 tablet (75 mg) by mouth daily    Coronary artery disease involving native coronary artery of native heart without angina pectoris       DULoxetine 60 MG EC capsule    CYMBALTA    90 capsule    TAKE 1 CAPSULE EVERY DAY  (DOSE  INCREASE)    Moderate major depression (H)       FLONASE 50 MCG/ACT spray   Generic drug:  fluticasone      Spray 1 spray into both nostrils as needed        furosemide 20 MG tablet    LASIX    15 tablet    Take 1 tablet (20 mg) by mouth daily    Edema of lower extremity       HAIR SKIN AND NAILS FORMULA PO      Take by mouth daily        HYDROcodone-acetaminophen 5-325 MG per tablet    NORCO    90 tablet    Take 1 tablet by mouth every 8 hours as needed for moderate to severe pain    Multiple joint pain       IBANdronate 150 MG tablet    BONIVA     Take 150 mg by mouth every 30 days Patient takes on the first day of each month.        ICY HOT EX      Apply to affected area every morning        lisinopril 20 MG tablet    PRINIVIL/ZESTRIL    90 tablet    Take 1 tablet (20 mg) by mouth  daily    Essential hypertension       metFORMIN 500 MG 24 hr tablet    GLUCOPHAGE-XR    180 tablet    Take 2 tablets (1,000 mg) by mouth daily (with dinner)    Type 2 diabetes mellitus with diabetic nephropathy, without long-term current use of insulin (H)       metoprolol succinate 100 MG 24 hr tablet    TOPROL-XL    90 tablet    TAKE 1 TABLET EVERY DAY  (DUE  FOR  OFFICE  VISIT, PLEASE MAKE APPT.)    Essential hypertension       neomycin-polymyxin-hydrocortisone otic solution    CORTISPORIN    10 mL    Place 4 drops into both ears 4 times daily    Left ear pain       NITROQUICK SL      Place 0.4 mg under the tongue every 5 minutes as needed        nystatin-triamcinolone cream    MYCOLOG II    60 g    APPLY TOPICALLY TWICE DAILY    Tinea of the body       order for DME      DREAMSTATION 5-15 CM/H20 NASAL WISP FABRIC        ranitidine 150 MG tablet    ZANTAC    60 tablet    Take 1 tablet (150 mg) by mouth 2 times daily    Dry cough       TRUEPLUS LANCETS 28G Misc     100 each    1 each 2 times daily as needed    Type 2 diabetes mellitus with diabetic nephropathy (H)       vitamin D 2000 units Caps      Take by mouth daily        * Notice:  This list has 2 medication(s) that are the same as other medications prescribed for you. Read the directions carefully, and ask your doctor or other care provider to review them with you.

## 2018-07-09 NOTE — TELEPHONE ENCOUNTER
Abdominal Pain, Right Lower Quadrant 07/06/2018 6 mo ED/IP 0/0  [phone] (home)     Hosp f/u 7/9/18 with PCP

## 2018-07-09 NOTE — PROGRESS NOTES
"  SUBJECTIVE:   Moira Andre is a 84 year old female who presents to clinic today for the following health issues:          Hospital Follow-up Visit:    Hospital/Nursing Home/IP Rehab Facility: [facility]  Date of Admission:   Date of Discharge:   Reason(s) for Admission: Symptomatic ventral hernia. Resolved.            Problems taking medications regularly:  None       Medication changes since discharge: None       Problems adhering to non-medication therapy:  None    Summary of hospitalization:  {HOSPITAL DISCHARGE SUMMARY INFO:532272::\"[facility] discharge summary reviewed\"}  Diagnostic Tests/Treatments reviewed.  Follow up needed: {NONE DEFAULTED:552022::\"none\"}  Other Healthcare Providers Involved in Patient s Care:         {those currently involved after discharge:759611::\"None\"}  Update since discharge: {IMPROVED DEFAULT:876484::\"improved.\"} {include information from family, SNF, care coordination}    Post Discharge Medication Reconciliation: {ACO Med Rec (Provider):368365}.  Plan of care communicated with {Communicate Plan to:105441::\"patient\"}     Coding guidelines for this visit:  Type of Medical   Decision Making Face-to-Face Visit       within 7 Days of discharge Face-to-Face Visit        within 14 days of discharge   Moderate Complexity 68465 [id number]   High Complexity 22061 44790              {additional problems for provider to add:548337}    Problem list and histories reviewed & adjusted, as indicated.  Additional history: {NONE - AS DOCUMENTED:151869::\"as documented\"}    {HIST REVIEW/ LINKS 2:864187}    Reviewed and updated as needed this visit by clinical staff       Reviewed and updated as needed this visit by Provider         {PROVIDER CHARTING PREFERENCE:199525}   [facility]  [address] [address]  [phone]  Dept: [phone]    PRE-OP EVALUATION:  Today's date: 2018    Moira Andre (: 1933) presents for " pre-operative evaluation assessment as requested by  ***.  She requires evaluation and anesthesia risk assessment prior to undergoing surgery/procedure for treatment of *** .    {PREOP QUESTIONNAIRE OPTIONS (by MA):807059}    HPI:     HPI related to upcoming procedure: ***      {Ch. Problems:616297}    MEDICAL HISTORY:     Patient Active Problem List    Diagnosis Date Noted     Osteoarthritis 01/29/2016     Priority: High     Rheumatoid arthritis involving both hands with negative rheumatoid factor (H) 01/29/2016     Priority: High     Per her [facility]ologist at [facility] she does not has RA  See note in care every where dated 9/14/2016       High risk medication use 01/29/2016     Priority: High     Controlled substance agreement signed 09/16/2016     Priority: Medium     Signed on 6/2016       Other chronic pain 06/28/2016     Priority: Medium     Patient is followed by [facility] for ongoing prescription of pain medication.  All refills should be approved by this provider, or covering partner.    Medication(s): Norco  Maximum quantity per month: # 90   Clinic visit frequency required: Q 3 months     Controlled substance agreement:  [unfilled]  Pain Clinic evaluation in the past: No    DIRE Total Score(s):  No flowsheet data found.    Last [facility] website verification:  none   [email or web address]               Anxiety 02/11/2016     Priority: Medium     Bilateral edema of lower extremity 11/10/2015     Priority: Medium     Essential hypertension 10/13/2015     Priority: Medium     Gastroesophageal reflux disease without esophagitis 10/13/2015     Priority: Medium     Health Care Home 04/10/2015     Priority: Medium     State Tier Level:  unknown  Status:  Declined  Care Coordinator:  Lauren Hsu    EMERGENCY CARE PLAN  Presenting Problem Signs and Symptoms Treatment Plan    Questions or conerns during clinic hours    I will call the clinic directly     Questions or conerns  outside clinic hours    I will call the 24 hour nurse line at [phone]    Patient needs to schedule an appointment    I will call the 24 hour scheduling team at [phone] or clinic directly    Same day treatment     I will call the clinic first, nurse line if after hours, urgent care and express care if needed                          Date:  April 10, 2015         Anemia due to blood loss, acute 04/08/2015     Priority: Medium     S/P total knee arthroplasty 03/25/2015     Priority: Medium     OA (osteoarthritis) of knee      Priority: Medium     Microalbuminuria 04/30/2014     Priority: Medium     ELIGIO on CPAP 10/30/2013     Priority: Medium     Moderate major depression (H) 10/03/2013     Priority: Medium     Ventral hernia 10/28/2012     Priority: Medium     Intermittent asthma 10/28/2012     Priority: Medium     Advnce Care Planning 06/14/2012     Priority: Medium     Honoring choices letter mailed. 7- Yodit Mcbride RT (R)  Patient states has Advance Directive and will bring in a copy to clinic. 6/14/2012          CAD (coronary artery disease) 05/10/2012     Priority: Medium     Type 2 diabetes mellitus with diabetic nephropathy (H) 05/10/2012     Priority: Medium     Transient cerebral ischemia 05/10/2012     Priority: Medium     Diagnosis updated by automated process. Provider to review and confirm.       Hyperlipidemia LDL goal <70 05/10/2012     Priority: Medium     CKD (chronic kidney disease) stage 3, GFR 30-59 ml/min      Priority: Medium     x 2007 atleast       Morbid obesity (H)      Priority: Medium      Past Medical History:   Diagnosis Date     Aortic valve sclerosis     heart murmur, no AS     Arrhythmia     PAT, PVC     Aspirin allergy     Plavix use long term     Asthma      CKD (chronic kidney disease) stage 3, GFR 30-59 ml/min     x 2007 atleast     Congestive heart failure, unspecified      Depression      Diabetes mellitus (H) 2010     Diastolic dysfunction, left ventricle 2013     grade 2, nl ef     HTN (hypertension)      Migraine headache      Myocardial infarction 9/2007, cath 2013 ml    BMS: stent to OM, diag, nl EF, echo /C angia 2013 , f/u cath no lesion >40%     OA (osteoarthritis) of knee      Obesity      Rheumatoid arthritis flare (H)     prednisone     Sleep apnea     on CPAP (not using 2016)     TIA (transient ischaemic attack)      Ventral hernia, unspecified, without mention of obstruction or gangrene      Past Surgical History:   Procedure Laterality Date     APPENDECTOMY       BIOPSY BREAST      x2 -needle & lumpectomy-benign     CHOLECYSTECTOMY       CORONARY ANGIOGRAPHY ADULT ORDER  9/28/2007    Bare metal stent to OM1, Diagonal patent      CORONARY ANGIOGRAPHY ADULT ORDER  9/25/2007    [place] stent to Diagonal     HC LEFT HEART CATHETERIZATION  8/2013    Moderate CAD     HYSTERECTOMY TOTAL ABDOMINAL       ORTHOPEDIC SURGERY      knee replacement on right side (2006), Left side (2016)     RELEASE CARPAL TUNNEL      right and left     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     Current Outpatient Prescriptions   Medication Sig Dispense Refill     acetaminophen (TYLENOL) 325 MG tablet Take 325-650 mg by mouth every 6 hours as needed for mild pain       ADVAIR DISKUS 100-50 MCG/DOSE diskus inhaler INHALE 1 PUFF EVERY 12 HOURS 3 Inhaler 3     albuterol (PROAIR HFA/PROVENTIL HFA/VENTOLIN HFA) 108 (90 BASE) MCG/ACT Inhaler Inhale 2 puffs into the lungs every 6 hours as needed for shortness of breath / dyspnea 1 Inhaler 3     atorvastatin (LIPITOR) 20 MG tablet Take 1 tablet (20 mg) by mouth daily 90 tablet 3     blood glucose monitoring (NO BRAND SPECIFIED) meter device kit Use to test blood sugar 1-2 times daily or as directed. 1 kit 0     blood glucose monitoring (TRUE METRIX BLOOD GLUCOSE TEST) test strip 1 strip by In Vitro route daily 200 strip 3     Blood Glucose Monitoring Suppl (TRUE METRIX AIR GLUCOSE METER) W/DEVICE KIT USE AS DIRECTED 1 kit 0      "Cholecalciferol (VITAMIN D) 2000 UNITS CAPS Take by mouth daily       clopidogrel (PLAVIX) 75 MG tablet Take 1 tablet (75 mg) by mouth daily 90 tablet 3     DULoxetine (CYMBALTA) 60 MG EC capsule TAKE 1 CAPSULE EVERY DAY  (DOSE  INCREASE) 90 capsule 1     fluticasone (FLONASE) 50 MCG/ACT nasal spray Spray 1 spray into both nostrils as needed        HYDROcodone-acetaminophen (NORCO) 5-325 MG per tablet Take 1 tablet by mouth every 8 hours as needed for moderate to severe pain 90 tablet 0     IBANdronate (BONIVA) 150 MG tablet Take 150 mg by mouth every 30 days Patient takes on the first day of each month.       lisinopril (PRINIVIL/ZESTRIL) 10 MG tablet Take 1 tablet (10 mg) by mouth daily 90 tablet 1     Menthol, Topical Analgesic, (ICY HOT EX) Apply to affected area every morning       metFORMIN (GLUCOPHAGE-XR) 500 MG 24 hr tablet Take 2 tablets (1,000 mg) by mouth daily (with dinner) 180 tablet 1     metoprolol succinate (TOPROL-XL) 100 MG 24 hr tablet TAKE 1 TABLET EVERY DAY  (DUE  FOR  OFFICE  VISIT, PLEASE MAKE APPT.) 90 tablet 3     Multiple Vitamins-Minerals (HAIR SKIN AND NAILS FORMULA PO) Take by mouth daily       neomycin-polymyxin-hydrocortisone (CORTISPORIN) otic solution Place 4 drops into both ears 4 times daily 10 mL 0     Nitroglycerin (NITROQUICK SL) Place 0.4 mg under the tongue every 5 minutes as needed        nystatin-triamcinolone (MYCOLOG II) cream APPLY TOPICALLY TWICE DAILY 60 g 1     order for DME DREAMSTATION  5-15 CM/H20  NASAL WISP FABRIC       TRUEPLUS LANCETS 28G MISC 1 each 2 times daily as needed 100 each 3     OTC products: {OTC ANALGESICS:114361}    Allergies   Allergen Reactions     Aspirin Hives     Reaction occurred during childhood.      Minocycline      Yellow Dye Allergy. Minocycline has Yellow Dye #10.     Yellow Dye Hives     Rxn to yellow tablet. Eyes swelled shut.       Latex Allergy: {YES/NO WITH DEFAULT:010574::\"NO\"}    Social History   Substance Use Topics     Smoking " "status: Former Smoker     Quit date: 4/24/1973     Smokeless tobacco: Never Used     Alcohol use 0.0 - 0.6 oz/week     0 - 1 Standard drinks or equivalent per week      Comment: rarely     History   Drug Use No       REVIEW OF SYSTEMS:   {ROS Preop Choices:404819}    EXAM:   There were no vitals taken for this visit.  {EXAM Preop Choices:613169}    DIAGNOSTICS:   {DIAGNOSTIC FOR PREOP:371769}    Recent Labs   Lab Test  07/06/18   0848  07/05/18   1557  07/05/18   0655  07/04/18   0658  04/03/18   1559   07/19/17   0655   09/21/16   1300   HGB  11.9  10.9*  10.9*  13.4  13.4   --   12.7   --   13.5   PLT  266   --   220  294  307   --   273   --   297   INR   --    --    --    --    --    --   0.93   --   0.99   NA   --    --   141  137  140   < >  139   < >  138   POTASSIUM   --    --   4.7  4.9  4.3   < >  4.6   < >  4.1   CR   --    --   1.25*  1.28*  1.18*   < >  1.06*   < >  1.37*   A1C   --    --    --   7.6*  7.2*   < >   --    < >   --     < > = values in this interval not displayed.        IMPRESSION:   Reason for surgery/procedure: ***  Diagnosis/reason for consult: ***    The proposed surgical procedure is considered {HIGH=major cardiovascular or procedures requiring prolonged anesthesia >4 hours or large fluid shifts;    INTERMEDIATE=abdominal, most orthopedic and intrathoracic surgery; LOW= endoscopy, cataract and breast surgery:475600} risk.    REVISED CARDIAC RISK INDEX  The patient has the following serious cardiovascular risks for perioperative complications such as (MI, PE, VFib and 3  AV Block):  {PREOP REVISED CARDIAC INDEX (RCI):161009:p:\"No serious cardiac risks\"}  INTERPRETATION: {REVISED CARDIAC RISK INTERPRETATION:773150}    The patient has the following additional risks for perioperative complications:  {Additional perioperative risks:377356:p:\"No identified additional risks\"}    Moira was seen today for pre-op exam and hospital f/u.    Diagnoses and all orders for this visit:    Preop " "general physical exam        RECOMMENDATIONS:     {IMPORTANT - Conditions - complete carefully!!:883271}    {IMPORTANT - Medications:747883::\"--Patient is to take all scheduled medications on the day of surgery EXCEPT for modifications listed below.\"}    {IMPORTANT - Approval:280573:p:\"APPROVAL GIVEN to proceed with proposed procedure, without further diagnostic evaluation\"}       Signed Electronically by: Maryan Churchill MD    Copy of this evaluation report is provided to requesting physician.    [facility] Preop Guidelines    Revised Cardiac Risk Index  "

## 2018-07-09 NOTE — NURSING NOTE
"Chief Complaint   Patient presents with     Hospital F/U     /80  Pulse 76  Temp 98.2  F (36.8  C) (Tympanic)  Ht 5' 7\" (1.702 m)  Wt 296 lb (134.3 kg)  SpO2 95%  BMI 46.36 kg/m2 Estimated body mass index is 46.36 kg/(m^2) as calculated from the following:    Height as of this encounter: 5' 7\" (1.702 m).    Weight as of this encounter: 296 lb (134.3 kg).  Medication Reconciliation: complete      Health Maintenance that is potentially due pending provider review:  NONE    n/a    BASIL Tena  "

## 2018-08-06 ENCOUNTER — OFFICE VISIT (OUTPATIENT)
Dept: FAMILY MEDICINE | Facility: CLINIC | Age: 83
End: 2018-08-06
Payer: COMMERCIAL

## 2018-08-06 VITALS
WEIGHT: 284.5 LBS | TEMPERATURE: 97.1 F | DIASTOLIC BLOOD PRESSURE: 72 MMHG | HEIGHT: 67 IN | HEART RATE: 68 BPM | SYSTOLIC BLOOD PRESSURE: 126 MMHG | BODY MASS INDEX: 44.65 KG/M2 | OXYGEN SATURATION: 96 %

## 2018-08-06 DIAGNOSIS — F32.1 MODERATE MAJOR DEPRESSION (H): ICD-10-CM

## 2018-08-06 DIAGNOSIS — N18.30 CKD (CHRONIC KIDNEY DISEASE) STAGE 3, GFR 30-59 ML/MIN (H): ICD-10-CM

## 2018-08-06 DIAGNOSIS — R80.9 MICROALBUMINURIA: ICD-10-CM

## 2018-08-06 DIAGNOSIS — E11.21 TYPE 2 DIABETES MELLITUS WITH DIABETIC NEPHROPATHY, WITHOUT LONG-TERM CURRENT USE OF INSULIN (H): Primary | ICD-10-CM

## 2018-08-06 DIAGNOSIS — R60.0 EDEMA OF LOWER EXTREMITY: ICD-10-CM

## 2018-08-06 DIAGNOSIS — I10 BENIGN HYPERTENSION: ICD-10-CM

## 2018-08-06 DIAGNOSIS — E78.5 HYPERLIPIDEMIA LDL GOAL <70: ICD-10-CM

## 2018-08-06 LAB
ANION GAP SERPL CALCULATED.3IONS-SCNC: 10 MMOL/L (ref 3–14)
BUN SERPL-MCNC: 27 MG/DL (ref 7–30)
CALCIUM SERPL-MCNC: 8.9 MG/DL (ref 8.5–10.1)
CHLORIDE SERPL-SCNC: 108 MMOL/L (ref 94–109)
CHOLEST SERPL-MCNC: 149 MG/DL
CO2 SERPL-SCNC: 23 MMOL/L (ref 20–32)
CREAT SERPL-MCNC: 1.35 MG/DL (ref 0.52–1.04)
CREAT UR-MCNC: 224 MG/DL
GFR SERPL CREATININE-BSD FRML MDRD: 37 ML/MIN/1.7M2
GLUCOSE SERPL-MCNC: 130 MG/DL (ref 70–99)
HDLC SERPL-MCNC: 45 MG/DL
LDLC SERPL CALC-MCNC: 46 MG/DL
MICROALBUMIN UR-MCNC: 214 MG/L
MICROALBUMIN/CREAT UR: 95.54 MG/G CR (ref 0–25)
NONHDLC SERPL-MCNC: 104 MG/DL
POTASSIUM SERPL-SCNC: 5.1 MMOL/L (ref 3.4–5.3)
SODIUM SERPL-SCNC: 141 MMOL/L (ref 133–144)
TRIGL SERPL-MCNC: 290 MG/DL

## 2018-08-06 PROCEDURE — 82043 UR ALBUMIN QUANTITATIVE: CPT | Performed by: INTERNAL MEDICINE

## 2018-08-06 PROCEDURE — 80048 BASIC METABOLIC PNL TOTAL CA: CPT | Performed by: INTERNAL MEDICINE

## 2018-08-06 PROCEDURE — 36415 COLL VENOUS BLD VENIPUNCTURE: CPT | Performed by: INTERNAL MEDICINE

## 2018-08-06 PROCEDURE — 99214 OFFICE O/P EST MOD 30 MIN: CPT | Performed by: INTERNAL MEDICINE

## 2018-08-06 PROCEDURE — 80061 LIPID PANEL: CPT | Performed by: INTERNAL MEDICINE

## 2018-08-06 RX ORDER — DULOXETIN HYDROCHLORIDE 60 MG/1
60 CAPSULE, DELAYED RELEASE ORAL DAILY
Qty: 90 CAPSULE | Refills: 1 | Status: SHIPPED | OUTPATIENT
Start: 2018-08-06 | End: 2018-12-18

## 2018-08-06 RX ORDER — FUROSEMIDE 20 MG
20 TABLET ORAL DAILY PRN
Qty: 15 TABLET | Refills: 1 | Status: SHIPPED | OUTPATIENT
Start: 2018-08-06 | End: 2018-10-31

## 2018-08-06 NOTE — PATIENT INSTRUCTIONS
Labs today  Urine analysis today  If dry cough doesn't improve, we can consider switching lisinopril to losartan  We may consider chest XR  Follow up in 4 months  Seek sooner medical attention if there is any worsening of symptoms or problems

## 2018-08-06 NOTE — LETTER
[place] [address] [facility]  [address]  [place]  [address]  Tel: [phone]    August 8, 2018    Moira Andre  [address]  [place] [address]        Dear Ms. Andre,    Mert Pizarro,    This is to inform you regarding your test result.    Urine test is positive for protein in urine.  The numbers are stable.  Tight control of Blood Pressure is important.  Your total cholesterol is normal.  The triglycerides are high. Lowering  the amount of sugar ,alcohol and sweets in the diet helps to control this.Exercise and weight loss helps.  HDL which is called good cholesterol is low.  Your LDL cholesterol is normal.  This is often call bad cholesterol and high levels increase the risk for heart attacks and strokes.  Eat low cholesterol low fat  diet and do regular physical activity. Avoid high sugar containing food.  Chronic kidney disease is stable.    If you have any further questions or problems, please contact our office.      Sincerely,    Maryan Churchill MD/ Kortney Swanson CMA  Results for orders placed or performed in visit on 08/06/18   Lipid panel reflex to direct LDL Fasting   Result Value Ref Range    Cholesterol 149 <200 mg/dL    Triglycerides 290 (H) <150 mg/dL    HDL Cholesterol 45 (L) >49 mg/dL    LDL Cholesterol Calculated 46 <100 mg/dL    Non HDL Cholesterol 104 <130 mg/dL   Basic metabolic panel   Result Value Ref Range    Sodium 141 133 - 144 mmol/L    Potassium 5.1 3.4 - 5.3 mmol/L    Chloride 108 94 - 109 mmol/L    Carbon Dioxide 23 20 - 32 mmol/L    Anion Gap 10 3 - 14 mmol/L    Glucose 130 (H) 70 - 99 mg/dL    Urea Nitrogen 27 7 - 30 mg/dL    Creatinine 1.35 (H) 0.52 - 1.04 mg/dL    GFR Estimate 37 (L) >60 mL/min/1.7m2    GFR Estimate If Black 45 (L) >60 mL/min/1.7m2    Calcium 8.9 8.5 - 10.1 mg/dL   Albumin Random Urine Quantitative with Creat Ratio   Result Value Ref Range    Creatinine Urine 224 mg/dL    Albumin Urine mg/L 214 mg/L    Albumin Urine mg/g Cr 95.54  (H) 0 - 25 mg/g Cr               Enclosure: Lab Results

## 2018-08-06 NOTE — PROGRESS NOTES
"  SUBJECTIVE:   Moira Andre is a 84 year old female who presents to clinic today for the following health issues:      Chief Complaint   Patient presents with     Follow Up For     leg edema, cough, and blood pressure   Patient has not been taking BP at home  She finished course of Lasix, and has lost about 12 pounds water weight since last visit.   Edema has improved nicely    She's been taking 20 mg of lisinopril  Reports she had intermittent episodes of cough a few weeks ago  It's intermittent  She's been on lisinopril for much longer than onset of cough  Reports that cough has gotten better lately    Reports she's been perspiring a lot lately    Problem list and histories reviewed & adjusted, as indicated.  Additional history: as documented    Medications and Labs reviewed in EPIC    Reviewed and updated as needed this visit by clinical staff  Tobacco  Allergies  Meds       Reviewed and updated as needed this visit by Provider       ROS:  Constitutional, HEENT, cardiovascular, pulmonary, GI, , musculoskeletal, neuro, skin, endocrine and psych systems are negative, except as otherwise noted.    This document serves as a record of the services and decisions personally performed and made by Maryan Churchill MD. It was created on her behalf by Jeanette Ha, a trained medical scribe. The creation of this document is based on the provider's statements to the medical scribe.  Jeanette Ha 9:22 AM August 6, 2018    OBJECTIVE:     /72  Pulse 68  Temp 97.1  F (36.2  C) (Oral)  Ht 1.702 m (5' 7\")  Wt 129 kg (284 lb 8 oz)  SpO2 96%  Breastfeeding? No  BMI 44.56 kg/m2  Body mass index is 44.56 kg/(m^2).  GENERAL: uses assisted walker, morbidly obese, alert and no distress  PSYCH: mentation appears normal, affect normal/bright    Diagnostic Test Results:  No results found for this or any previous visit (from the past 24 hour(s)).    Reviewed and discussed CXR done on 01/07/16  Reviewed and discussed " labs done on 04/03/18  ASSESSMENT/PLAN:     Moira was seen today for follow up for.    Diagnoses and all orders for this visit:    Type 2 diabetes mellitus with diabetic nephropathy, without long-term current use of insulin (H)  -     Lipid panel reflex to direct LDL Fasting  -     Basic metabolic panel  -     Albumin Random Urine Quantitative with Creat Ratio  Doing well  Compliant with medication  Lab Results   Component Value Date    A1C 7.6 07/04/2018    A1C 7.2 04/03/2018    A1C 6.6 08/15/2017    A1C 6.7 04/11/2017    A1C 6.7 12/16/2016     Microalbuminuria  Urine test  today    Hyperlipidemia LDL goal <70  -     Lipid panel reflex to direct LDL Fasting    CKD (chronic kidney disease) stage 3, GFR 30-59 ml/min  Stable and she is on lisinopril.    Benign hypertension  Has been compliant with higher dose of lisinopril  Reports episodes of dry cough have developed a few weeks ago  She has been on lisinopril for a long time  Cough started recently in comparison  It's gotten better recently  However, advised patient to monitor if cough persists  I will consider switching her to losartan instead    Edema of lower extremity  -     furosemide (LASIX) 20 MG tablet; Take 1 tablet (20 mg) by mouth daily as needed  Doing well and there is remarkable improvement.  Compliant with medication  Advised low sodium diet    Moderate major depression (H)  -     DULoxetine (CYMBALTA) 60 MG EC capsule; Take 1 capsule (60 mg) by mouth daily  Doing well  Compliant with medication  Mood has been better lately    Other orders  -     PAF COMPLETED    Patient had many medical questions in office visit, all of which were answered  I spent extensive amount of time providing counseling and reassurance    Patient Instructions   Labs today  Urine analysis today  If dry cough doesn't improve, we can consider switching lisinopril to losartan  We may consider chest XR  Follow up in 4 months  Seek sooner medical attention if there is any worsening  of symptoms or problems    The information in this document, created by the medical scribe for me, accurately reflects the services I personally performed and the decisions made by me. I have reviewed and approved this document for accuracy prior to leaving the patient care area.  August 6, 2018 9:33 AM    Maryan Churchill MD  [facility]

## 2018-08-06 NOTE — MR AVS SNAPSHOT
After Visit Summary   8/6/2018    Moira Andre    MRN: [id number]           Patient Information     Date Of Birth          9/24/1933        Visit Information        Provider Department      8/6/2018 9:00 AM Maryan Churchill MD [facility]        Today's Diagnoses     Type 2 diabetes mellitus with diabetic nephropathy, without long-term current use of insulin (H)    -  1    Microalbuminuria        Hyperlipidemia LDL goal <70        CKD (chronic kidney disease) stage 3, GFR 30-59 ml/min        Benign hypertension        Edema of lower extremity        Moderate major depression (H)          Care Instructions    Labs today  Urine analysis today  If dry cough doesn't improve, we can consider switching lisinopril to losartan  We may consider chest XR  Follow up in 4 months  Seek sooner medical attention if there is any worsening of symptoms or problems          Follow-ups after your visit        Your next 10 appointments already scheduled     Sep 17, 2018 10:00 AM CDT   Ech Complete with [id number]   [facility] CV Echocardiography ([facility])    [address]  [address]  [facility] [id number] [phone]           1.  Please bring or wear a comfortable two-piece outfit. 2.  You may eat, drink and take your normal medicines. 3.  For any questions that cannot be answered, please contact the ordering physician 4.  Please do not wear perfumes or scented lotions on the day of your exam.            Sep 17, 2018  1:15 PM CDT   Return Visit with Jozef Sinha MD   [facility] ([facility])    [address] [place] [address] [address]  [facility] [id number] [phone] OPT 2              Who to contact     If you have questions or need follow up information about today's clinic visit or your schedule please contact [facility] directly at [phone].  Normal or non-critical lab and imaging  "results will be communicated to you by MyChart, letter or phone within 4 business days after the clinic has received the results. If you do not hear from us within 7 days, please contact the clinic through [employer] or phone. If you have a critical or abnormal lab result, we will notify you by phone as soon as possible.  Submit refill requests through [employer] or call your pharmacy and they will forward the refill request to us. Please allow 3 business days for your refill to be completed.          Additional Information About Your Visit        Side.[employer] Information     [employer] lets you send messages to your doctor, view your test results, renew your prescriptions, schedule appointments and more. To sign up, go to [email or web address] . Click on \"Log in\" on the left side of the screen, which will take you to the Welcome page. Then click on \"Sign up Now\" on the right side of the page.     You will be asked to enter the access code listed below, as well as some personal information. Please follow the directions to create your username and password.     Your access code is: [email or web address]  Expires: 10/3/2018  9:43 AM     Your access code will  in 90 days. If you need help or a new code, please call your [facility] clinic or [phone].        Care EveryWhere ID     This is your Care EveryWhere ID. This could be used by other organizations to access your [facility] medical records  [id number]        Your Vitals Were     Pulse Temperature Height Pulse Oximetry Breastfeeding? BMI (Body Mass Index)    68 97.1  F (36.2  C) (Oral) 5' 7\" (1.702 m) 96% No 44.56 kg/m2       Blood Pressure from Last 3 Encounters:   18 126/72   18 144/80   18 169/80    Weight from Last 3 Encounters:   18 284 lb 8 oz (129 kg)   18 296 lb (134.3 kg)   18 280 lb (127 kg)              We Performed the Following     Albumin Random Urine Quantitative with Creat Ratio     Basic metabolic panel     Lipid panel reflex " to direct LDL Fasting     PAF COMPLETED          Today's Medication Changes          These changes are accurate as of 8/6/18  9:32 AM.  If you have any questions, ask your nurse or doctor.               These medicines have changed or have updated prescriptions.        Dose/Directions    DULoxetine 60 MG EC capsule   Commonly known as:  CYMBALTA   This may have changed:  See the new instructions.   Used for:  Moderate major depression (H)   Changed by:  Maryan Churchill MD        Dose:  60 mg   Take 1 capsule (60 mg) by mouth daily   Quantity:  90 capsule   Refills:  1       furosemide 20 MG tablet   Commonly known as:  LASIX   This may have changed:    - when to take this  - reasons to take this   Used for:  Edema of lower extremity   Changed by:  Maryan Churchill MD        Dose:  20 mg   Take 1 tablet (20 mg) by mouth daily as needed   Quantity:  15 tablet   Refills:  1            Where to get your medicines      These medications were sent to [id number] - [place], [place] - [address]  [address] [facility] [address]     Phone:  [phone]     DULoxetine 60 MG EC capsule    furosemide 20 MG tablet                Primary Care Provider Office Phone # Fax #    Maryan Churchill [phone] [address] [facility] [address]  [facility] [address]        Equal Access to Services     DEL AGUILAR AH: Hadii gissel ku hadasho Soomaali, waaxda luqadaha, qaybta kaalmada adeegyada, andreina kingin hayfarzana shaw . So [facility] [phone].    ATENCIÓN: Si habla español, tiene a gold disposición servicios gratuitos de asistencia lingüística. Llame al [phone].    We comply with applicable federal civil rights laws and [place] laws. We do not discriminate on the basis of race, color, national origin, age, disability, sex, sexual orientation, or gender identity.            Thank you!     Thank you for choosing [facility]  for your care. Our goal is always to  provide you with excellent care. Hearing back from our patients is one way we can continue to improve our services. Please take a few minutes to complete the written survey that you may receive in the mail after your visit with us. Thank you!             Your Updated Medication List - Protect others around you: Learn how to safely use, store and throw away your medicines at www.disposemymeds.org.          This list is accurate as of 8/6/18  9:32 AM.  Always use your most recent med list.                   Brand Name Dispense Instructions for use Diagnosis    acetaminophen 325 MG tablet    TYLENOL     Take 325-650 mg by mouth every 6 hours as needed for mild pain        ADVAIR DISKUS 100-50 MCG/DOSE diskus inhaler   Generic drug:  fluticasone-salmeterol     3 Inhaler    INHALE 1 PUFF EVERY 12 HOURS    Intermittent asthma, uncomplicated       albuterol 108 (90 Base) MCG/ACT Inhaler    PROAIR HFA/PROVENTIL HFA/VENTOLIN HFA    1 Inhaler    Inhale 2 puffs into the lungs every 6 hours as needed for shortness of breath / dyspnea    Intermittent asthma, uncomplicated       atorvastatin 20 MG tablet    LIPITOR    90 tablet    Take 1 tablet (20 mg) by mouth daily    Hyperlipidemia LDL goal <70       * blood glucose monitoring meter device kit    no brand specified    1 kit    Use to test blood sugar 1-2 times daily or as directed.    Type 2 diabetes mellitus with diabetic nephropathy (H)       * TRUE METRIX AIR GLUCOSE METER w/Device Kit     1 kit    USE AS DIRECTED    Type 2 diabetes mellitus with diabetic nephropathy (H)       blood glucose monitoring test strip    TRUE METRIX BLOOD GLUCOSE TEST    200 strip    1 strip by In Vitro route daily    Type 2 diabetes mellitus with diabetic nephropathy, without long-term current use of insulin (H)       clopidogrel 75 MG tablet    PLAVIX    90 tablet    Take 1 tablet (75 mg) by mouth daily    Coronary artery disease involving native coronary artery of native heart without angina  pectoris       DULoxetine 60 MG EC capsule    CYMBALTA    90 capsule    Take 1 capsule (60 mg) by mouth daily    Moderate major depression (H)       FLONASE 50 MCG/ACT spray   Generic drug:  fluticasone      Spray 1 spray into both nostrils as needed        furosemide 20 MG tablet    LASIX    15 tablet    Take 1 tablet (20 mg) by mouth daily as needed    Edema of lower extremity       HAIR SKIN AND NAILS FORMULA PO      Take by mouth daily        HYDROcodone-acetaminophen 5-325 MG per tablet    NORCO    90 tablet    Take 1 tablet by mouth every 8 hours as needed for moderate to severe pain    Multiple joint pain       IBANdronate 150 MG tablet    BONIVA     Take 150 mg by mouth every 30 days Patient takes on the first day of each month.        ICY HOT EX      Apply to affected area every morning        lisinopril 20 MG tablet    PRINIVIL/ZESTRIL    90 tablet    Take 1 tablet (20 mg) by mouth daily    Essential hypertension       metFORMIN 500 MG 24 hr tablet    GLUCOPHAGE-XR    180 tablet    Take 2 tablets (1,000 mg) by mouth daily (with dinner)    Type 2 diabetes mellitus with diabetic nephropathy, without long-term current use of insulin (H)       metoprolol succinate 100 MG 24 hr tablet    TOPROL-XL    90 tablet    TAKE 1 TABLET EVERY DAY  (DUE  FOR  OFFICE  VISIT, PLEASE MAKE APPT.)    Essential hypertension       neomycin-polymyxin-hydrocortisone otic solution    CORTISPORIN    10 mL    Place 4 drops into both ears 4 times daily    Left ear pain       NITROQUICK SL      Place 0.4 mg under the tongue every 5 minutes as needed        nystatin-triamcinolone cream    MYCOLOG II    60 g    APPLY TOPICALLY TWICE DAILY    Tinea of the body       order for DME      DREAMSTATION 5-15 CM/H20 NASAL WISP FABRIC        ranitidine 150 MG tablet    ZANTAC    60 tablet    Take 1 tablet (150 mg) by mouth 2 times daily    Dry cough       TRUEPLUS LANCETS 28G Misc     100 each    1 each 2 times daily as needed    Type 2 diabetes  mellitus with diabetic nephropathy (H)       vitamin D 2000 units Caps      Take by mouth daily        * Notice:  This list has 2 medication(s) that are the same as other medications prescribed for you. Read the directions carefully, and ask your doctor or other care provider to review them with you.

## 2018-08-06 NOTE — PROGRESS NOTES
"  SUBJECTIVE:   Moira Andre is a 84 year old female who presents to clinic today for the following health issues:      Chief Complaint   Patient presents with     Follow Up For     leg edema, cough, and blood pressure       {additional problems for provider to add:380927}    Problem list and histories reviewed & adjusted, as indicated.  Additional history: {NONE - AS DOCUMENTED:991490::\"as documented\"}    {HIST REVIEW/ LINKS 2:017771}    Reviewed and updated as needed this visit by clinical staff  Meds       Reviewed and updated as needed this visit by Provider         {PROVIDER CHARTING PREFERENCE:748865}    "

## 2018-08-08 NOTE — PROGRESS NOTES
Please notify patient by sending following letter with copy of test results      Amilcarchayo Moira,    This is to inform you regarding your test result.    Urine test is positive for protein in urine.  The numbers are stable.  Tight control of Blood Pressure is important.  Your total cholesterol is normal.  The triglycerides are high. Lowering  the amount of sugar ,alcohol and sweets in the diet helps to control this.Exercise and weight loss helps.  HDL which is called good cholesterol is low.  Your LDL cholesterol is normal.  This is often call bad cholesterol and high levels increase the risk for heart attacks and strokes.  Eat low cholesterol low fat  diet and do regular physical activity. Avoid high sugar containing food.  Chronic kidney disease is stable.      Sincerely,      Dr.Nasima Kerline MD,FACP

## 2018-08-22 ENCOUNTER — HOSPITAL ENCOUNTER (OUTPATIENT)
Facility: CLINIC | Age: 83
Setting detail: OBSERVATION
Discharge: HOME | End: 2018-08-24
Attending: EMERGENCY MEDICINE | Admitting: INTERNAL MEDICINE
Payer: COMMERCIAL

## 2018-08-22 ENCOUNTER — APPOINTMENT (OUTPATIENT)
Dept: CT IMAGING | Facility: CLINIC | Age: 83
End: 2018-08-22
Attending: EMERGENCY MEDICINE
Payer: COMMERCIAL

## 2018-08-22 DIAGNOSIS — I10 ESSENTIAL HYPERTENSION: ICD-10-CM

## 2018-08-22 DIAGNOSIS — R79.89 LACTATE BLOOD INCREASE: ICD-10-CM

## 2018-08-22 DIAGNOSIS — K43.9 VENTRAL HERNIA WITHOUT OBSTRUCTION OR GANGRENE: ICD-10-CM

## 2018-08-22 DIAGNOSIS — R11.2 NON-INTRACTABLE VOMITING WITH NAUSEA, UNSPECIFIED VOMITING TYPE: ICD-10-CM

## 2018-08-22 DIAGNOSIS — N20.1 URETERAL STONE: ICD-10-CM

## 2018-08-22 LAB
ALBUMIN SERPL-MCNC: 3.8 G/DL (ref 3.4–5)
ALBUMIN UR-MCNC: 10 MG/DL
ALP SERPL-CCNC: 62 U/L (ref 40–150)
ALT SERPL W P-5'-P-CCNC: 17 U/L (ref 0–50)
ANION GAP SERPL CALCULATED.3IONS-SCNC: 11 MMOL/L (ref 3–14)
APPEARANCE UR: CLEAR
AST SERPL W P-5'-P-CCNC: 13 U/L (ref 0–45)
BASOPHILS # BLD AUTO: 0 10E9/L (ref 0–0.2)
BASOPHILS NFR BLD AUTO: 0.3 %
BILIRUB SERPL-MCNC: 0.3 MG/DL (ref 0.2–1.3)
BILIRUB UR QL STRIP: NEGATIVE
BUN SERPL-MCNC: 25 MG/DL (ref 7–30)
CALCIUM SERPL-MCNC: 9.2 MG/DL (ref 8.5–10.1)
CHLORIDE SERPL-SCNC: 106 MMOL/L (ref 94–109)
CO2 SERPL-SCNC: 20 MMOL/L (ref 20–32)
COLOR UR AUTO: ABNORMAL
CREAT SERPL-MCNC: 1.52 MG/DL (ref 0.52–1.04)
DIFFERENTIAL METHOD BLD: ABNORMAL
EOSINOPHIL # BLD AUTO: 0 10E9/L (ref 0–0.7)
EOSINOPHIL NFR BLD AUTO: 0.3 %
ERYTHROCYTE [DISTWIDTH] IN BLOOD BY AUTOMATED COUNT: 14.4 % (ref 10–15)
GFR SERPL CREATININE-BSD FRML MDRD: 33 ML/MIN/1.7M2
GLUCOSE SERPL-MCNC: 156 MG/DL (ref 70–99)
GLUCOSE UR STRIP-MCNC: NEGATIVE MG/DL
HCT VFR BLD AUTO: 40 % (ref 35–47)
HGB BLD-MCNC: 12.7 G/DL (ref 11.7–15.7)
HGB UR QL STRIP: ABNORMAL
IMM GRANULOCYTES # BLD: 0 10E9/L (ref 0–0.4)
IMM GRANULOCYTES NFR BLD: 0.3 %
KETONES UR STRIP-MCNC: NEGATIVE MG/DL
LACTATE BLD-SCNC: 2.4 MMOL/L (ref 0.7–2)
LACTATE BLD-SCNC: 3.5 MMOL/L (ref 0.7–2)
LEUKOCYTE ESTERASE UR QL STRIP: NEGATIVE
LIPASE SERPL-CCNC: 225 U/L (ref 73–393)
LYMPHOCYTES # BLD AUTO: 1.2 10E9/L (ref 0.8–5.3)
LYMPHOCYTES NFR BLD AUTO: 10.2 %
MCH RBC QN AUTO: 27.5 PG (ref 26.5–33)
MCHC RBC AUTO-ENTMCNC: 31.8 G/DL (ref 31.5–36.5)
MCV RBC AUTO: 87 FL (ref 78–100)
MONOCYTES # BLD AUTO: 0.5 10E9/L (ref 0–1.3)
MONOCYTES NFR BLD AUTO: 4.6 %
MUCOUS THREADS #/AREA URNS LPF: PRESENT /LPF
NEUTROPHILS # BLD AUTO: 9.8 10E9/L (ref 1.6–8.3)
NEUTROPHILS NFR BLD AUTO: 84.3 %
NITRATE UR QL: NEGATIVE
NRBC # BLD AUTO: 0 10*3/UL
NRBC BLD AUTO-RTO: 0 /100
PH UR STRIP: 5 PH (ref 5–7)
PLATELET # BLD AUTO: 268 10E9/L (ref 150–450)
POTASSIUM SERPL-SCNC: 4.8 MMOL/L (ref 3.4–5.3)
PROT SERPL-MCNC: 7.8 G/DL (ref 6.8–8.8)
RBC # BLD AUTO: 4.61 10E12/L (ref 3.8–5.2)
RBC #/AREA URNS AUTO: 7 /HPF (ref 0–2)
SODIUM SERPL-SCNC: 137 MMOL/L (ref 133–144)
SOURCE: ABNORMAL
SP GR UR STRIP: 1.02 (ref 1–1.03)
SQUAMOUS #/AREA URNS AUTO: 3 /HPF (ref 0–1)
UROBILINOGEN UR STRIP-MCNC: NORMAL MG/DL (ref 0–2)
WBC # BLD AUTO: 11.7 10E9/L (ref 4–11)
WBC #/AREA URNS AUTO: 1 /HPF (ref 0–5)

## 2018-08-22 PROCEDURE — 85025 COMPLETE CBC W/AUTO DIFF WBC: CPT | Performed by: EMERGENCY MEDICINE

## 2018-08-22 PROCEDURE — 80053 COMPREHEN METABOLIC PANEL: CPT | Performed by: EMERGENCY MEDICINE

## 2018-08-22 PROCEDURE — 96376 TX/PRO/DX INJ SAME DRUG ADON: CPT

## 2018-08-22 PROCEDURE — 87040 BLOOD CULTURE FOR BACTERIA: CPT | Performed by: EMERGENCY MEDICINE

## 2018-08-22 PROCEDURE — G0378 HOSPITAL OBSERVATION PER HR: HCPCS

## 2018-08-22 PROCEDURE — 25000128 H RX IP 250 OP 636: Performed by: EMERGENCY MEDICINE

## 2018-08-22 PROCEDURE — 99207 ZZC CDG-CODE CATEGORY CHANGED: CPT | Performed by: INTERNAL MEDICINE

## 2018-08-22 PROCEDURE — 99220 ZZC INITIAL OBSERVATION CARE,LEVL III: CPT | Performed by: INTERNAL MEDICINE

## 2018-08-22 PROCEDURE — 96361 HYDRATE IV INFUSION ADD-ON: CPT

## 2018-08-22 PROCEDURE — 96375 TX/PRO/DX INJ NEW DRUG ADDON: CPT

## 2018-08-22 PROCEDURE — 96374 THER/PROPH/DIAG INJ IV PUSH: CPT | Mod: 59

## 2018-08-22 PROCEDURE — 81001 URINALYSIS AUTO W/SCOPE: CPT | Performed by: EMERGENCY MEDICINE

## 2018-08-22 PROCEDURE — 83605 ASSAY OF LACTIC ACID: CPT | Performed by: EMERGENCY MEDICINE

## 2018-08-22 PROCEDURE — 25000125 ZZHC RX 250: Performed by: EMERGENCY MEDICINE

## 2018-08-22 PROCEDURE — 99285 EMERGENCY DEPT VISIT HI MDM: CPT | Mod: 25

## 2018-08-22 PROCEDURE — 74177 CT ABD & PELVIS W/CONTRAST: CPT

## 2018-08-22 PROCEDURE — 87086 URINE CULTURE/COLONY COUNT: CPT | Performed by: PHYSICIAN ASSISTANT

## 2018-08-22 PROCEDURE — 83690 ASSAY OF LIPASE: CPT | Performed by: EMERGENCY MEDICINE

## 2018-08-22 RX ORDER — ACETAMINOPHEN 325 MG/1
650 TABLET ORAL EVERY MORNING
Status: ON HOLD | COMMUNITY
End: 2018-08-24

## 2018-08-22 RX ORDER — MORPHINE SULFATE 2 MG/ML
2 INJECTION, SOLUTION INTRAMUSCULAR; INTRAVENOUS ONCE
Status: COMPLETED | OUTPATIENT
Start: 2018-08-22 | End: 2018-08-22

## 2018-08-22 RX ORDER — IOPAMIDOL 755 MG/ML
100 INJECTION, SOLUTION INTRAVASCULAR ONCE
Status: COMPLETED | OUTPATIENT
Start: 2018-08-22 | End: 2018-08-22

## 2018-08-22 RX ORDER — ONDANSETRON 2 MG/ML
4 INJECTION INTRAMUSCULAR; INTRAVENOUS EVERY 30 MIN PRN
Status: DISCONTINUED | OUTPATIENT
Start: 2018-08-22 | End: 2018-08-23

## 2018-08-22 RX ORDER — MORPHINE SULFATE 4 MG/ML
4 INJECTION, SOLUTION INTRAMUSCULAR; INTRAVENOUS ONCE
Status: COMPLETED | OUTPATIENT
Start: 2018-08-22 | End: 2018-08-22

## 2018-08-22 RX ADMIN — IOPAMIDOL 100 ML: 755 INJECTION, SOLUTION INTRAVENOUS at 21:49

## 2018-08-22 RX ADMIN — ONDANSETRON 4 MG: 2 INJECTION INTRAMUSCULAR; INTRAVENOUS at 20:26

## 2018-08-22 RX ADMIN — MORPHINE SULFATE 2 MG: 2 INJECTION, SOLUTION INTRAMUSCULAR; INTRAVENOUS at 22:28

## 2018-08-22 RX ADMIN — MORPHINE SULFATE 4 MG: 4 INJECTION, SOLUTION INTRAMUSCULAR; INTRAVENOUS at 20:26

## 2018-08-22 RX ADMIN — SODIUM CHLORIDE, PRESERVATIVE FREE 80 ML: 5 INJECTION INTRAVENOUS at 21:50

## 2018-08-22 RX ADMIN — SODIUM CHLORIDE 1000 ML: 9 INJECTION, SOLUTION INTRAVENOUS at 20:19

## 2018-08-22 RX ADMIN — ONDANSETRON 4 MG: 2 INJECTION INTRAMUSCULAR; INTRAVENOUS at 22:28

## 2018-08-22 ASSESSMENT — ENCOUNTER SYMPTOMS
ABDOMINAL PAIN: 1
CONSTIPATION: 0
NAUSEA: 1
VOMITING: 1
DIARRHEA: 0

## 2018-08-22 NOTE — IP AVS SNAPSHOT
MRN:[id number]                      After Visit Summary   8/22/2018    Moira Andre    MRN: [id number]           Thank you!     Thank you for choosing [facility] for your care. Our goal is always to provide you with excellent care. Hearing back from our patients is one way we can continue to improve our services. Please take a few minutes to complete the written survey that you may receive in the mail after you visit with us. Thank you!        Patient Information     Date Of Birth          9/24/1933        About your hospital stay     You were admitted on:  August 23, 2018 You last received care in the:  [place] [address] [facility]    You were discharged on:  August 24, 2018        Reason for your hospital stay       You were admitted with abdominal pain, has improved with conservative measures.                  Who to Call     For medical emergencies, please call 911.  For non-urgent questions about your medical care, please call your primary care provider or clinic, [phone]          Attending Provider     Provider Specialty    Mel Liu MD Emergency Medicine    Shira Coughlin MD Internal Medicine       Primary Care Provider Office Phone # Fax #    Maryan Churchill [phone]       When to contact your care team       Call your primary doctor if you have any of the following: increased pain.                  After Care Instructions     Activity       Your activity upon discharge: activity as tolerated  no driving while taking narcotics.            Diet       Low-salt, low-fat, low carb diet.            Discharge Instructions       Equate oral hydration.  Aggressive incentive spirometry.  Consider lifestyle modification with diet and exercise.  PRN over-the-counter stool toughness as needed for constipation.  Follow-up with urology as needed as outpatient.            Monitor and record       blood pressure daily  pulse daily and review on provider visit.                   Follow-up Appointments     Follow Up ([facility])       Follow up in urology office as needed.   [facility] ([facility])  [address], [address]  [place]. [address]  You may call [phone] with any questions or concerns.   Central Appointment #: [phone]            Follow-up and recommended labs and tests        Follow up with primary care provider, Maryan Churchill, within 7 days for hospital follow- up.  The following labs/tests are recommended: bmp.                  Your next 10 appointments already scheduled     Aug 31, 2018 10:00 AM CDT   [facility] Visit with Maryan Churchill MD   [facility]    [address] [id number] [phone]           Bring a current list of meds and any records pertaining to this visit. For Physicals, please bring immunization records and any forms needing to be filled out. Please arrive 10 minutes early to complete paperwork.            Sep 17, 2018 10:00 AM CDT   Ech Complete with [id number]   [facility] CV Echocardiography ([facility])    [address]  [address]  [place] [id number] [phone]           1.  Please bring or wear a comfortable two-piece outfit. 2.  You may eat, drink and take your normal medicines. 3.  For any questions that cannot be answered, please contact the ordering physician 4.  Please do not wear perfumes or scented lotions on the day of your exam.            Sep 17, 2018  1:15 PM CDT   Return Visit with Jozef Sinha MD   [facility] ([facility])    [address]  [place] [id number] [phone] OPT 2            Dec 03, 2018  9:00 AM CST   Office Visit with Maryan Churchill MD   [facility]    [address] [id number] [phone]            "Bring a current list of meds and any records pertaining to this visit. For Physicals, please bring immunization records and any forms needing to be filled out. Please arrive 10 minutes early to complete paperwork.              Pending Results     Date and Time Order Name Status Description    2018 1335 Urine Culture Aerobic Bacterial Preliminary     2018 1321 Urine Culture Aerobic Bacterial Preliminary     2018 2120 Blood culture Preliminary     2018 2118 Blood culture Preliminary             Statement of Approval     Ordered          18 1215  I have reviewed and agree with all the recommendations and orders detailed in this document.  EFFECTIVE NOW     Approved and electronically signed by:  Neftali Meyers MD             Admission Information     Date & Time Provider Department Dept. Phone    2018 Shira Coughlin MD [place] [address] [facility] [phone]      Your Vitals Were     Blood Pressure Pulse Temperature Respirations Pulse Oximetry       126/52 (BP Location: Left arm) 72 98.6  F (37  C) (Oral) 18 92%       MyChart Information     [facility] lets you send messages to your doctor, view your test results, renew your prescriptions, schedule appointments and more. To sign up, go to [email or web address] . Click on \"Log in\" on the left side of the screen, which will take you to the Welcome page. Then click on \"Sign up Now\" on the right side of the page.     You will be asked to enter the access code listed below, as well as some personal information. Please follow the directions to create your username and password.     Your access code is: [email or web address]  Expires: 10/3/2018  9:43 AM     Your access code will  in 90 days. If you need help or a new code, please call your [place] clinic or [phone].        Care EveryWhere ID     This is your Care EveryWhere ID. This could be used by other organizations to access your [facility] medical records  [id number]      "   Equal Access to Services     [facility] Hospital[facility]IGIDA : Hadii aad ku hadahsanmagdiel Grey, wanapoleonda luqadaha, qaallisonta rufusfeliceandreina quevedo. So [facility] [phone].    ATENCIÓN: Si habla español, tiene a gold disposición servicios gratuitos de asistencia lingüística. Llame al [phone].    We comply with applicable federal civil rights laws and [place] laws. We do not discriminate on the basis of race, color, national origin, age, disability, sex, sexual orientation, or gender identity.               Review of your medicines      START taking        Dose / Directions    NIFEdipine ER 30 MG Tb24   Commonly known as:  ADALAT CC   Used for:  Ureteral stone        Dose:  30 mg   Start taking on:  8/25/2018   Take 1 tablet (30 mg) by mouth daily   Quantity:  14 tablet   Refills:  0         CONTINUE these medicines which may have CHANGED, or have new prescriptions. If we are uncertain of the size of tablets/capsules you have at home, strength may be listed as something that might have changed.        Dose / Directions    acetaminophen 325 MG tablet   Commonly known as:  TYLENOL   This may have changed:  Another medication with the same name was removed. Continue taking this medication, and follow the directions you see here.        Dose:  325-650 mg   Take 325-650 mg by mouth every 6 hours as needed for mild pain   Refills:  0       lisinopril 20 MG tablet   Commonly known as:  PRINIVIL/ZESTRIL   This may have changed:  additional instructions   Used for:  Essential hypertension        Dose:  20 mg   Take 1 tablet (20 mg) by mouth daily Hold until review creatinine on provider visit   Quantity:  90 tablet   Refills:  0       metFORMIN 500 MG 24 hr tablet   Commonly known as:  GLUCOPHAGE-XR   This may have changed:  when to take this   Used for:  Type 2 diabetes mellitus with diabetic nephropathy, without long-term current use of insulin (H)        Dose:  1000 mg   Take 2 tablets (1,000 mg) by mouth  daily (with dinner)   Quantity:  180 tablet   Refills:  1       neomycin-polymyxin-hydrocortisone otic solution   Commonly known as:  CORTISPORIN   This may have changed:    - when to take this  - reasons to take this   Used for:  Left ear pain        Dose:  4 drop   Place 4 drops into both ears 4 times daily   Quantity:  10 mL   Refills:  0       nystatin-triamcinolone cream   Commonly known as:  MYCOLOG II   This may have changed:  See the new instructions.   Used for:  Tinea of the body        APPLY TOPICALLY TWICE DAILY   Quantity:  60 g   Refills:  1         CONTINUE these medicines which have NOT CHANGED        Dose / Directions    ADVAIR DISKUS 100-50 MCG/DOSE diskus inhaler   Used for:  Intermittent asthma, uncomplicated   Generic drug:  fluticasone-salmeterol        INHALE 1 PUFF EVERY 12 HOURS   Quantity:  3 Inhaler   Refills:  3       albuterol 108 (90 Base) MCG/ACT inhaler   Commonly known as:  PROAIR HFA/PROVENTIL HFA/VENTOLIN HFA   Used for:  Intermittent asthma, uncomplicated        Dose:  2 puff   Inhale 2 puffs into the lungs every 6 hours as needed for shortness of breath / dyspnea   Quantity:  1 Inhaler   Refills:  3       atorvastatin 20 MG tablet   Commonly known as:  LIPITOR   Used for:  Hyperlipidemia LDL goal <70        Dose:  20 mg   Take 1 tablet (20 mg) by mouth daily   Quantity:  90 tablet   Refills:  3       * blood glucose monitoring meter device kit   Commonly known as:  no brand specified   Used for:  Type 2 diabetes mellitus with diabetic nephropathy (H)        Use to test blood sugar 1-2 times daily or as directed.   Quantity:  1 kit   Refills:  0       * TRUE METRIX AIR GLUCOSE METER w/Device Kit   Used for:  Type 2 diabetes mellitus with diabetic nephropathy (H)        USE AS DIRECTED   Quantity:  1 kit   Refills:  0       blood glucose monitoring test strip   Commonly known as:  TRUE METRIX BLOOD GLUCOSE TEST   Used for:  Type 2 diabetes mellitus with diabetic nephropathy,  without long-term current use of insulin (H)        Dose:  1 strip   1 strip by In Vitro route daily   Quantity:  200 strip   Refills:  3       clopidogrel 75 MG tablet   Commonly known as:  PLAVIX   Used for:  Coronary artery disease involving native coronary artery of native heart without angina pectoris        Dose:  75 mg   Take 1 tablet (75 mg) by mouth daily   Quantity:  90 tablet   Refills:  3       DULoxetine 60 MG EC capsule   Commonly known as:  CYMBALTA   Used for:  Moderate major depression (H)        Dose:  60 mg   Take 1 capsule (60 mg) by mouth daily   Quantity:  90 capsule   Refills:  1       FLONASE 50 MCG/ACT spray   Generic drug:  fluticasone        Dose:  1 spray   Spray 1 spray into both nostrils as needed   Refills:  0       furosemide 20 MG tablet   Commonly known as:  LASIX   Used for:  Edema of lower extremity        Dose:  20 mg   Take 1 tablet (20 mg) by mouth daily as needed   Quantity:  15 tablet   Refills:  1       HYDROcodone-acetaminophen 5-325 MG per tablet   Commonly known as:  NORCO   Used for:  Multiple joint pain        Dose:  1 tablet   Take 1 tablet by mouth every 8 hours as needed for moderate to severe pain   Quantity:  90 tablet   Refills:  0       IBANdronate 150 MG tablet   Commonly known as:  BONIVA        Dose:  150 mg   Take 150 mg by mouth every 30 days Patient takes on the first day of each month.   Refills:  0       ICY HOT EX        Apply to affected area every morning   Refills:  0       metoprolol succinate 100 MG 24 hr tablet   Commonly known as:  TOPROL-XL   Used for:  Essential hypertension        TAKE 1 TABLET EVERY DAY  (DUE  FOR  OFFICE  VISIT, PLEASE MAKE APPT.)   Quantity:  90 tablet   Refills:  3       NITROQUICK SL        Dose:  0.4 mg   Place 0.4 mg under the tongue every 5 minutes as needed   Refills:  0       order for DME        DREAMSTATION 5-15 CM/H20 NASAL WISP FABRIC   Refills:  0       ranitidine 150 MG tablet   Commonly known as:  ZANTAC   Used  for:  Dry cough        Dose:  150 mg   Take 1 tablet (150 mg) by mouth 2 times daily   Quantity:  60 tablet   Refills:  1       TRUEPLUS LANCETS 28G Misc   Used for:  Type 2 diabetes mellitus with diabetic nephropathy (H)        Dose:  1 each   1 each 2 times daily as needed   Quantity:  100 each   Refills:  3       vitamin D 2000 units Caps        Take by mouth daily   Refills:  0       * Notice:  This list has 2 medication(s) that are the same as other medications prescribed for you. Read the directions carefully, and ask your doctor or other care provider to review them with you.         Where to get your medicines      These medications were sent to [facility] [place], [place] - [address]  [address], [place] [address]     Phone:  [phone]     NIFEdipine ER 30 MG Tb24                Protect others around you: Learn how to safely use, store and throw away your medicines at www.disposemymeds.org.             Medication List: This is a list of all your medications and when to take them. Check marks below indicate your daily home schedule. Keep this list as a reference.      Medications           Morning Afternoon Evening Bedtime As Needed    acetaminophen 325 MG tablet   Commonly known as:  TYLENOL   Take 325-650 mg by mouth every 6 hours as needed for mild pain                                ADVAIR DISKUS 100-50 MCG/DOSE diskus inhaler   INHALE 1 PUFF EVERY 12 HOURS   Generic drug:  fluticasone-salmeterol                                albuterol 108 (90 Base) MCG/ACT inhaler   Commonly known as:  PROAIR HFA/PROVENTIL HFA/VENTOLIN HFA   Inhale 2 puffs into the lungs every 6 hours as needed for shortness of breath / dyspnea                                atorvastatin 20 MG tablet   Commonly known as:  LIPITOR   Take 1 tablet (20 mg) by mouth daily                                * blood glucose monitoring meter device kit   Commonly known as:  no brand specified   Use to test  blood sugar 1-2 times daily or as directed.                                * TRUE METRIX AIR GLUCOSE METER w/Device Kit   USE AS DIRECTED                                blood glucose monitoring test strip   Commonly known as:  TRUE METRIX BLOOD GLUCOSE TEST   1 strip by In Vitro route daily                                clopidogrel 75 MG tablet   Commonly known as:  PLAVIX   Take 1 tablet (75 mg) by mouth daily                                DULoxetine 60 MG EC capsule   Commonly known as:  CYMBALTA   Take 1 capsule (60 mg) by mouth daily   Last time this was given:  60 mg on 8/24/2018  8:29 AM                                FLONASE 50 MCG/ACT spray   Spray 1 spray into both nostrils as needed   Generic drug:  fluticasone                                furosemide 20 MG tablet   Commonly known as:  LASIX   Take 1 tablet (20 mg) by mouth daily as needed                                HYDROcodone-acetaminophen 5-325 MG per tablet   Commonly known as:  NORCO   Take 1 tablet by mouth every 8 hours as needed for moderate to severe pain                                IBANdronate 150 MG tablet   Commonly known as:  BONIVA   Take 150 mg by mouth every 30 days Patient takes on the first day of each month.                                ICY HOT EX   Apply to affected area every morning                                lisinopril 20 MG tablet   Commonly known as:  PRINIVIL/ZESTRIL   Take 1 tablet (20 mg) by mouth daily Hold until review creatinine on provider visit                                metFORMIN 500 MG 24 hr tablet   Commonly known as:  GLUCOPHAGE-XR   Take 2 tablets (1,000 mg) by mouth daily (with dinner)                                metoprolol succinate 100 MG 24 hr tablet   Commonly known as:  TOPROL-XL   TAKE 1 TABLET EVERY DAY  (DUE  FOR  OFFICE  VISIT, PLEASE MAKE APPT.)   Last time this was given:  100 mg on 8/24/2018  8:27 AM                                neomycin-polymyxin-hydrocortisone otic solution    Commonly known as:  CORTISPORIN   Place 4 drops into both ears 4 times daily                                NIFEdipine ER 30 MG Tb24   Commonly known as:  ADALAT CC   Take 1 tablet (30 mg) by mouth daily   Start taking on:  8/25/2018   Last time this was given:  30 mg on 8/24/2018  8:30 AM                                NITROQUICK SL   Place 0.4 mg under the tongue every 5 minutes as needed                                nystatin-triamcinolone cream   Commonly known as:  MYCOLOG II   APPLY TOPICALLY TWICE DAILY                                order for DME   DREAMSTATION 5-15 CM/H20 NASAL WISP FABRIC                                ranitidine 150 MG tablet   Commonly known as:  ZANTAC   Take 1 tablet (150 mg) by mouth 2 times daily   Last time this was given:  150 mg on 8/23/2018  9:37 PM                                TRUEPLUS LANCETS 28G Misc   1 each 2 times daily as needed                                vitamin D 2000 units Caps   Take by mouth daily                                * Notice:  This list has 2 medication(s) that are the same as other medications prescribed for you. Read the directions carefully, and ask your doctor or other care provider to review them with you.

## 2018-08-22 NOTE — IP AVS SNAPSHOT
[address]    [facility] [address]    Phone:  [phone]                                       After Visit Summary   8/22/2018    Moira Andre    MRN: [id number]           After Visit Summary Signature Page     I have received my discharge instructions, and my questions have been answered. I have discussed any challenges I see with this plan with the nurse or doctor.    ..........................................................................................................................................  Patient/Patient Representative Signature      ..........................................................................................................................................  Patient Representative Print Name and Relationship to Patient    ..................................................               ................................................  Date                                            Time    ..........................................................................................................................................  Reviewed by Signature/Title    ...................................................              ..............................................  Date                                                            Time          22EPIC Rev 08/18

## 2018-08-23 PROBLEM — N20.0 NEPHROLITHIASIS: Status: ACTIVE | Noted: 2018-08-23

## 2018-08-23 LAB
ANION GAP SERPL CALCULATED.3IONS-SCNC: 10 MMOL/L (ref 3–14)
BUN SERPL-MCNC: 24 MG/DL (ref 7–30)
CALCIUM SERPL-MCNC: 8.3 MG/DL (ref 8.5–10.1)
CHLORIDE SERPL-SCNC: 106 MMOL/L (ref 94–109)
CO2 SERPL-SCNC: 20 MMOL/L (ref 20–32)
CREAT SERPL-MCNC: 1.69 MG/DL (ref 0.52–1.04)
ERYTHROCYTE [DISTWIDTH] IN BLOOD BY AUTOMATED COUNT: 14.4 % (ref 10–15)
GFR SERPL CREATININE-BSD FRML MDRD: 29 ML/MIN/1.7M2
GLUCOSE BLDC GLUCOMTR-MCNC: 126 MG/DL (ref 70–99)
GLUCOSE BLDC GLUCOMTR-MCNC: 126 MG/DL (ref 70–99)
GLUCOSE BLDC GLUCOMTR-MCNC: 141 MG/DL (ref 70–99)
GLUCOSE BLDC GLUCOMTR-MCNC: 143 MG/DL (ref 70–99)
GLUCOSE BLDC GLUCOMTR-MCNC: 154 MG/DL (ref 70–99)
GLUCOSE SERPL-MCNC: 145 MG/DL (ref 70–99)
HCT VFR BLD AUTO: 39 % (ref 35–47)
HGB BLD-MCNC: 12.2 G/DL (ref 11.7–15.7)
LACTATE SERPL-SCNC: 2.2 MMOL/L (ref 0.4–2)
MCH RBC QN AUTO: 27.4 PG (ref 26.5–33)
MCHC RBC AUTO-ENTMCNC: 31.3 G/DL (ref 31.5–36.5)
MCV RBC AUTO: 87 FL (ref 78–100)
PLATELET # BLD AUTO: 251 10E9/L (ref 150–450)
POTASSIUM SERPL-SCNC: 5.3 MMOL/L (ref 3.4–5.3)
RBC # BLD AUTO: 4.46 10E12/L (ref 3.8–5.2)
SODIUM SERPL-SCNC: 136 MMOL/L (ref 133–144)
WBC # BLD AUTO: 12.5 10E9/L (ref 4–11)

## 2018-08-23 PROCEDURE — 00000146 ZZHCL STATISTIC GLUCOSE BY METER IP

## 2018-08-23 PROCEDURE — 99225 ZZC SUBSEQUENT OBSERVATION CARE,LEVEL II: CPT | Performed by: PHYSICIAN ASSISTANT

## 2018-08-23 PROCEDURE — 99207 ZZC CDG-MDM COMPONENT: MEETS LOW - DOWN CODED: CPT | Performed by: PHYSICIAN ASSISTANT

## 2018-08-23 PROCEDURE — 96361 HYDRATE IV INFUSION ADD-ON: CPT

## 2018-08-23 PROCEDURE — G0378 HOSPITAL OBSERVATION PER HR: HCPCS

## 2018-08-23 PROCEDURE — 36415 COLL VENOUS BLD VENIPUNCTURE: CPT | Performed by: INTERNAL MEDICINE

## 2018-08-23 PROCEDURE — 94640 AIRWAY INHALATION TREATMENT: CPT

## 2018-08-23 PROCEDURE — 85027 COMPLETE CBC AUTOMATED: CPT | Performed by: INTERNAL MEDICINE

## 2018-08-23 PROCEDURE — 96376 TX/PRO/DX INJ SAME DRUG ADON: CPT

## 2018-08-23 PROCEDURE — 25000128 H RX IP 250 OP 636: Performed by: INTERNAL MEDICINE

## 2018-08-23 PROCEDURE — 80048 BASIC METABOLIC PNL TOTAL CA: CPT | Performed by: INTERNAL MEDICINE

## 2018-08-23 PROCEDURE — 96375 TX/PRO/DX INJ NEW DRUG ADDON: CPT

## 2018-08-23 PROCEDURE — 25000132 ZZH RX MED GY IP 250 OP 250 PS 637: Performed by: INTERNAL MEDICINE

## 2018-08-23 PROCEDURE — 96372 THER/PROPH/DIAG INJ SC/IM: CPT

## 2018-08-23 PROCEDURE — 25000128 H RX IP 250 OP 636: Performed by: PHYSICIAN ASSISTANT

## 2018-08-23 PROCEDURE — 25000131 ZZH RX MED GY IP 250 OP 636 PS 637: Performed by: INTERNAL MEDICINE

## 2018-08-23 PROCEDURE — 83605 ASSAY OF LACTIC ACID: CPT | Performed by: INTERNAL MEDICINE

## 2018-08-23 RX ORDER — CLOPIDOGREL BISULFATE 75 MG/1
75 TABLET ORAL DAILY
Status: DISCONTINUED | OUTPATIENT
Start: 2018-08-23 | End: 2018-08-23

## 2018-08-23 RX ORDER — METOPROLOL SUCCINATE 50 MG/1
100 TABLET, EXTENDED RELEASE ORAL DAILY
Status: DISCONTINUED | OUTPATIENT
Start: 2018-08-23 | End: 2018-08-24 | Stop reason: HOSPADM

## 2018-08-23 RX ORDER — SODIUM CHLORIDE 9 MG/ML
INJECTION, SOLUTION INTRAVENOUS CONTINUOUS
Status: DISCONTINUED | OUTPATIENT
Start: 2018-08-23 | End: 2018-08-23

## 2018-08-23 RX ORDER — OXYCODONE HYDROCHLORIDE 5 MG/1
5-10 TABLET ORAL
Status: DISCONTINUED | OUTPATIENT
Start: 2018-08-23 | End: 2018-08-24 | Stop reason: HOSPADM

## 2018-08-23 RX ORDER — ACETAMINOPHEN 650 MG/1
650 SUPPOSITORY RECTAL EVERY 4 HOURS PRN
Status: DISCONTINUED | OUTPATIENT
Start: 2018-08-23 | End: 2018-08-24 | Stop reason: HOSPADM

## 2018-08-23 RX ORDER — NICOTINE POLACRILEX 4 MG
15-30 LOZENGE BUCCAL
Status: DISCONTINUED | OUTPATIENT
Start: 2018-08-23 | End: 2018-08-24 | Stop reason: HOSPADM

## 2018-08-23 RX ORDER — SENNOSIDES 8.6 MG
1 TABLET ORAL 2 TIMES DAILY
Status: DISCONTINUED | OUTPATIENT
Start: 2018-08-23 | End: 2018-08-23

## 2018-08-23 RX ORDER — HYDROMORPHONE HYDROCHLORIDE 1 MG/ML
0.3 INJECTION, SOLUTION INTRAMUSCULAR; INTRAVENOUS; SUBCUTANEOUS
Status: DISCONTINUED | OUTPATIENT
Start: 2018-08-23 | End: 2018-08-24 | Stop reason: HOSPADM

## 2018-08-23 RX ORDER — DULOXETIN HYDROCHLORIDE 60 MG/1
60 CAPSULE, DELAYED RELEASE ORAL DAILY
Status: DISCONTINUED | OUTPATIENT
Start: 2018-08-23 | End: 2018-08-24 | Stop reason: HOSPADM

## 2018-08-23 RX ORDER — ACETAMINOPHEN 325 MG/1
650 TABLET ORAL EVERY 4 HOURS PRN
Status: DISCONTINUED | OUTPATIENT
Start: 2018-08-23 | End: 2018-08-24 | Stop reason: HOSPADM

## 2018-08-23 RX ORDER — CEFTRIAXONE 1 G/1
1 INJECTION, POWDER, FOR SOLUTION INTRAMUSCULAR; INTRAVENOUS EVERY 24 HOURS
Status: DISCONTINUED | OUTPATIENT
Start: 2018-08-23 | End: 2018-08-23

## 2018-08-23 RX ORDER — POLYETHYLENE GLYCOL 3350 17 G/17G
17 POWDER, FOR SOLUTION ORAL DAILY PRN
Status: DISCONTINUED | OUTPATIENT
Start: 2018-08-23 | End: 2018-08-24 | Stop reason: HOSPADM

## 2018-08-23 RX ORDER — CEFTRIAXONE 2 G/1
2 INJECTION, POWDER, FOR SOLUTION INTRAMUSCULAR; INTRAVENOUS EVERY 24 HOURS
Status: DISCONTINUED | OUTPATIENT
Start: 2018-08-23 | End: 2018-08-24 | Stop reason: HOSPADM

## 2018-08-23 RX ORDER — BISACODYL 10 MG
10 SUPPOSITORY, RECTAL RECTAL DAILY PRN
Status: DISCONTINUED | OUTPATIENT
Start: 2018-08-23 | End: 2018-08-24 | Stop reason: HOSPADM

## 2018-08-23 RX ORDER — NIFEDIPINE 30 MG/1
30 TABLET, EXTENDED RELEASE ORAL DAILY
Status: DISCONTINUED | OUTPATIENT
Start: 2018-08-23 | End: 2018-08-24 | Stop reason: HOSPADM

## 2018-08-23 RX ORDER — ALBUTEROL SULFATE 90 UG/1
2 AEROSOL, METERED RESPIRATORY (INHALATION) EVERY 6 HOURS PRN
Status: DISCONTINUED | OUTPATIENT
Start: 2018-08-23 | End: 2018-08-24 | Stop reason: HOSPADM

## 2018-08-23 RX ORDER — HYDRALAZINE HYDROCHLORIDE 20 MG/ML
10 INJECTION INTRAMUSCULAR; INTRAVENOUS EVERY 4 HOURS PRN
Status: DISCONTINUED | OUTPATIENT
Start: 2018-08-23 | End: 2018-08-24 | Stop reason: HOSPADM

## 2018-08-23 RX ORDER — DEXTROSE MONOHYDRATE 25 G/50ML
25-50 INJECTION, SOLUTION INTRAVENOUS
Status: DISCONTINUED | OUTPATIENT
Start: 2018-08-23 | End: 2018-08-24 | Stop reason: HOSPADM

## 2018-08-23 RX ORDER — ONDANSETRON 4 MG/1
4 TABLET, ORALLY DISINTEGRATING ORAL EVERY 6 HOURS PRN
Status: DISCONTINUED | OUTPATIENT
Start: 2018-08-23 | End: 2018-08-24 | Stop reason: HOSPADM

## 2018-08-23 RX ORDER — ONDANSETRON 2 MG/ML
4 INJECTION INTRAMUSCULAR; INTRAVENOUS EVERY 6 HOURS PRN
Status: DISCONTINUED | OUTPATIENT
Start: 2018-08-23 | End: 2018-08-24 | Stop reason: HOSPADM

## 2018-08-23 RX ORDER — SODIUM CHLORIDE 9 MG/ML
INJECTION, SOLUTION INTRAVENOUS CONTINUOUS
Status: DISCONTINUED | OUTPATIENT
Start: 2018-08-23 | End: 2018-08-24

## 2018-08-23 RX ORDER — NALOXONE HYDROCHLORIDE 0.4 MG/ML
.1-.4 INJECTION, SOLUTION INTRAMUSCULAR; INTRAVENOUS; SUBCUTANEOUS
Status: DISCONTINUED | OUTPATIENT
Start: 2018-08-23 | End: 2018-08-24 | Stop reason: HOSPADM

## 2018-08-23 RX ADMIN — ONDANSETRON 4 MG: 2 INJECTION INTRAMUSCULAR; INTRAVENOUS at 05:55

## 2018-08-23 RX ADMIN — OXYCODONE HYDROCHLORIDE 5 MG: 5 TABLET ORAL at 07:55

## 2018-08-23 RX ADMIN — METOPROLOL SUCCINATE 100 MG: 100 TABLET, EXTENDED RELEASE ORAL at 08:20

## 2018-08-23 RX ADMIN — RANITIDINE 150 MG: 150 TABLET ORAL at 21:37

## 2018-08-23 RX ADMIN — SODIUM CHLORIDE: 9 INJECTION, SOLUTION INTRAVENOUS at 17:11

## 2018-08-23 RX ADMIN — POLYETHYLENE GLYCOL 3350 17 G: 17 POWDER, FOR SOLUTION ORAL at 08:23

## 2018-08-23 RX ADMIN — INSULIN ASPART 1 UNITS: 100 INJECTION, SOLUTION INTRAVENOUS; SUBCUTANEOUS at 18:03

## 2018-08-23 RX ADMIN — OXYCODONE HYDROCHLORIDE 5 MG: 5 TABLET ORAL at 01:50

## 2018-08-23 RX ADMIN — CEFTRIAXONE SODIUM 2 G: 2 INJECTION, POWDER, FOR SOLUTION INTRAMUSCULAR; INTRAVENOUS at 14:11

## 2018-08-23 RX ADMIN — INSULIN ASPART 1 UNITS: 100 INJECTION, SOLUTION INTRAVENOUS; SUBCUTANEOUS at 08:50

## 2018-08-23 RX ADMIN — SODIUM CHLORIDE: 9 INJECTION, SOLUTION INTRAVENOUS at 14:11

## 2018-08-23 RX ADMIN — RANITIDINE 150 MG: 150 TABLET ORAL at 08:20

## 2018-08-23 RX ADMIN — DULOXETINE HYDROCHLORIDE 60 MG: 30 CAPSULE, DELAYED RELEASE ORAL at 08:19

## 2018-08-23 RX ADMIN — NIFEDIPINE 30 MG: 30 TABLET, FILM COATED, EXTENDED RELEASE ORAL at 08:20

## 2018-08-23 RX ADMIN — OXYCODONE HYDROCHLORIDE 5 MG: 5 TABLET ORAL at 06:33

## 2018-08-23 RX ADMIN — ONDANSETRON 4 MG: 2 INJECTION INTRAMUSCULAR; INTRAVENOUS at 14:02

## 2018-08-23 RX ADMIN — FLUTICASONE FUROATE AND VILANTEROL TRIFENATATE 1 PUFF: 100; 25 POWDER RESPIRATORY (INHALATION) at 08:53

## 2018-08-23 NOTE — PROGRESS NOTES
Report called to unit 88 receiving RN. Questions answered, pt aware of transfer. Chart and belongings will be sent with patient.

## 2018-08-23 NOTE — PLAN OF CARE
Problem: Patient Care Overview  Goal: Plan of Care/Patient Progress Review  Outcome: No Change  A&O x4, VSS except for BP, afebrile. Pt gets diaphoretic with nausea.  IV Zofran x1 given. Dry cough. LS clear. Voiding adequate, with coughing losing urine. Abdominal contour irregular d/t hernia. BS active. IV infusing with 100 mL/hr. Pain controlled with PRN oxycodone. Pt on regular diet but is tolerating clears. Will continue to monitor.

## 2018-08-23 NOTE — ED PROVIDER NOTES
History     Chief Complaint:  Abdominal Pain    HPI   Moira Andre is a 84 year old female with a known chronically non reducible ventral hernia and a history of CKD, CHF, MI, and Diabetes who presents with abdominal pain. The patient was seen in the ED last month for abdominal pain at the site of her hernia. Was possible going to have hernia repaired as inpatient but was on plavix. Was supposed to follow-up with surgeon as outpatient but never did. The patient states that this morning she started experiencing abdominal pain at the site of the hernia. She also vomited bile once and has continuously felt nauseated. She notes that her bowel movements have been hard lately, her last bowel movement was this morning. The patient also feels like her hernia is growing in size. She denies any diarrhea today but reports that she has been alternating between having very hard stools and very loose stools. She is on Plavix because she had cardiac stents placed 10 years ago.    Allergies:  Aspirin  Minocycline  Yellow Dye     Medications:    Tylenol  Advair  Proair  Iipitor  Plavix  Cymbalta  Flonase  Lasix  Boniva  Prinivil  Toprol  Nitroquick  Zantac  Metformin     Past Medical History:    Aortic valve sclerosis  Arrhythmia  Aspirin allergy  Asthma  CKD  CHF  Depression  Diabetes  Diastolic dysfunction  HTN  Migraine  MI  OA  Obesity  Rheumatoid arthritis  TIA  Sleep apnea  Ventral hernia    Past Surgical History:    Appendectomy  Breast biopsy  Cholecystectomy  Coronary angiography x2  Heart catheterization  Hysterectomy  Knee replacement  Release carpal tunnel      Family History:    Neurologic disorder  CAD    Social History:  Patient presents alone  Former smoker  Rare alcohol use  Marital Status:        Review of Systems   Gastrointestinal: Positive for abdominal pain, nausea and vomiting. Negative for constipation and diarrhea.   All other systems reviewed and are negative.      Physical Exam     Patient  Vitals for the past 24 hrs:   BP Temp Temp src Pulse Resp SpO2   08/22/18 2218 - - - - - 94 %   08/22/18 2217 158/87 - - - - -   08/22/18 2000 (!) 173/91 - - - - -   08/22/18 1947 (!) 191/100 97.9  F (36.6  C) Oral 82 16 95 %     Physical Exam  General: Appears uncomfortabel  Eyes:  The pupils are equal and round    Conjunctivae and sclerae are normal  ENT:    Moist mucous membranes  Neck:  Normal range of motion  CV:  Regular rate and rhythm    Skin warm and well perfused   Resp:  Lungs are clear    Non-labored    No rales    No wheezing   GI:  Abdomen is soft, there is no rigidity    No distension    No rebound tenderness     Tenderness on mid abdomen and RLQ    Vental hernia palpated that is non reducible with no overlying erythema  MS:  Normal muscular tone  Skin:  No rash or acute skin lesions noted  Neuro:   Awake, alert.      Speech is normal and fluent.    Face is symmetric.     Moves all extremities equally  Psych: Normal affect.  Appropriate interactions.    Emergency Department Course     Imaging:  Radiographic findings were communicated with the patient who voiced understanding of the findings.  CT Abdomen/Pelvis with IV contrast:   1. 0.3 cm distal right ureteral calculus at the ureterovesicular  junction, resulting in mild obstruction.  2. Colonic diverticulosis without evidence of diverticulitis.  3. Moderate to large ventral hernia containing transverse colon. No  evidence of bowel obstruction due to the hernia.  per radiology.       Laboratory:  CBC: WBC: 11.7, HGB: 12.7, PLT: 268  CMP: Glucose 156 (H), Creatinine: 1.52 (H), GFR: 33 (L), o/w WNL   Lipase: 225  2015: Lactic acid whole blood: 3.5 (H)  2210: Lactic acid whole blood: 2.4 (H)  Blood culture x2: pending  UA with micro: Blood: moderate, Protein albumin: 10, RBC: 7 (H), Squamous epithelial: 3 (H), Mucous: present o/w negative    Interventions:  2019 - NS 1L IV  2026 - Morphine 4 mg IV  2026 - Zofran 4 mg IV  2228 - Morphine 4 mg IV  2228 -  Zofran 4 mg IV    Emergency Department Course:  Nursing notes and vitals reviewed.  1948: I performed an exam of the patient as documented above. IV inserted and blood drawn.  Labs and imaging ordered.    2127: I rechecked the patient. Explained findings to patient. She reports that her abdominal pain has subsided after morphine    2218: I rechecked the patient. Explained findings to patient.    2302: Findings and plan explained to the Patient who consents to admission.     2307: Discussed the patient with Dr. Damian, who will admit the patient to a observation bed for further monitoring, evaluation, and treatment.       Impression & Plan      CMS Diagnoses: The Lactic acid level is elevated due to dehydration and vomiting, at this time there is no sign of severe sepsis or septic shock.     Medical Decision Making:  Moira Andre is a 84 year old female who presented to the emergency department with abdominal pain.  Patient has known nonreproducible ventral hernia. She is tender over the hernia.  Given her symptoms I am concerned for ischemia or obstruction related to the hernia. Lactate elevated which may partly be due to dehydration.  Her creatinine is slightly above baseline.  Given my concern regarding the hernia, did think that IV contrast was indicated to evaluate her bowel. CT abdomen shows ventral hernia though no obstruction or ischemia of bowel.  She does have a right distal ureteral calculus which is the likely cause of her symptoms today.  Urinalysis with few RBCs, no infection found.  Patient did throw up and have recurrence of her abdominal pain requiring additional medication in ED. Given her symptoms, will admit to hospital. No source of infection found so think that lactate likely elevated due to dehydration, vomiting and not due to sepsis. Due to this, did not give antibiotics. Lactate improving after 1 L of IV fluids. Will have patient come into hospital for symptom control. Discussed with   Ced for admission.    Diagnosis:    ICD-10-CM    1. Ureteral stone N20.1    2. Ventral hernia without obstruction or gangrene K43.9    3. Non-intractable vomiting with nausea, unspecified vomiting type R11.2    4. Lactate blood increase R79.89      Kieran MCKENNA, am serving as a scribe on 8/22/2018 at 7:48 PM to personally document services performed by Mel Liu MD based on my observations and the provider's statements to me.     Kieran Oliver  8/22/2018    EMERGENCY DEPARTMENT       Mel Liu MD  08/23/18 0046

## 2018-08-23 NOTE — ED NOTES
[facility]  ED Nurse Handoff Report    ED Chief complaint: Abdominal Pain (patient here with abdominal pain with vomiting ,stated she has a hernia)      ED Diagnosis:   Final diagnoses:   Ureteral stone   Ventral hernia without obstruction or gangrene   Non-intractable vomiting with nausea, unspecified vomiting type   Lactate blood increase       Code Status: Full Code    Allergies:   Allergies   Allergen Reactions     Aspirin Hives     Reaction occurred during childhood.      Minocycline      Yellow Dye Allergy. Minocycline has Yellow Dye #10.     Yellow Dye Hives     Rxn to yellow tablet. Eyes swelled shut.        Activity level - Baseline/Home:  Independent    Activity Level - Current:   Independent     Needed?: No    Isolation: No  Infection: Not Applicable  Bariatric?: Yes, needs larger commode, not larger bed.     Vital Signs:   Vitals:    08/22/18 1947 08/22/18 2000 08/22/18 2217 08/22/18 2218   BP: (!) 191/100 (!) 173/91 158/87    Pulse: 82      Resp: 16      Temp: 97.9  F (36.6  C)      TempSrc: Oral      SpO2: 95%   94%       Cardiac Rhythm: ,        Pain level: 0-10 Pain Scale: 5    Is this patient confused?: No   [place] - Suicide Severity Rating Scale Completed?  Yes  If yes, what color did the patient score?  White    Patient Report: Initial Complaint: abd pain, nausea and vomiting.   Focused Assessment: alert and orientated, vitally stable female. Lower abd pain, stated she has a hernia.   Tests Performed: labs, abd ct, vitals   Abnormal Results: lactic acid elevated x 2 , fluids given, pain medications given as well as antinausea meds.     Treatments provided: see above     Family Comments: NA     OBS brochure/video discussed/provided to patient: N/A    ED Medications:   Medications   ondansetron (ZOFRAN) injection 4 mg (4 mg Intravenous Given 8/22/18 2228)   0.9% sodium chloride BOLUS (not administered)   0.9% sodium chloride BOLUS (1,000 mLs Intravenous New Bag 8/22/18  2019)   morphine (PF) injection 4 mg (4 mg Intravenous Given 8/22/18 2026)   iopamidol (ISOVUE-370) solution 100 mL (100 mLs Intravenous Given 8/22/18 2149)   Saline (80 mLs As instructed Given 8/22/18 2150)   morphine (PF) injection 2 mg (2 mg Intravenous Given 8/22/18 2228)       Drips infusing?:  No    For the majority of the shift this patient was Green.   Interventions performed were NA.    Severe Sepsis OR Septic Shock Diagnosis Present: No      ED NURSE PHONE NUMBER: [phone]

## 2018-08-23 NOTE — CONSULTS
"[facility]    Urology Consultation     Date of Admission:  8/22/2018    Assessment & Plan   Moira Andre is a 84 year old female with large ventral hernia who was admitted on 8/22/2018. I was asked to see the patient for 3mm right UVJ stone.    AVSS, afebrile    UA bland, BCx NGTD    WBC 12.5    Cr. 1.69, up slightly from baseline of ~1.2    Lactic 2.2 td, 3.5 on presentation    Stone is small with only mild dilation of ureter. This is likely to pass spontaneously.    Pain does not seem to correlate with kidney stone and more likely due to ventral hernia.    IM following regarding infectious sxs. Rad reports some perinephric haziness, though PE does not correlate with pyelo (no CVA tenderness). May consider empiric treatment for pyelo given multiple high risk comorbidities.      Plan:     Strain urine     Nifedipine for ureteral dilation as flomax may contain yellow dye    Should follow up with PCP in 1-2 wks if still having pain    Should have follow up with PCP in 2-3 days for repeat Cr       Pablito Colin PA-C 8/23/2018 10:00 AM  Urology [facility] [employer]  Mon-Fri, 7am - 5pm  Text page [phone]  Office: [phone]      Code Status    Full Code    Reason for Consult   Reason for consult: Ureteral stone    Primary Care Physician   Maryan Churchill    Chief Complaint   Abd pain    History is obtained from the patient    History of Present Illness   Moira Andre is a 84 year old female who presents with abdominal pain ongoing for the last few months. She describes it as \"right in front\" while gesturing to her hernia. Does not hurt on on one side more than the other. No back pain, flank pain. No blood in urine, dysuria, frequency, urgency. CT showed small non-obstructing 3mm right UVJ stone.    Per ED provider's note:Moira Andre is a 84 year old female with a known chronically non reducible ventral hernia and a history of CKD, CHF, MI, and Diabetes who presents with abdominal pain. The " patient was seen in the ED last month for abdominal pain at the site of her hernia. Was possible going to have hernia repaired as inpatient but was on plavix. Was supposed to follow-up with surgeon as outpatient but never did. The patient states that this morning she started experiencing abdominal pain at the site of the hernia. She also vomited bile once and has continuously felt nauseated. She notes that her bowel movements have been hard lately, her last bowel movement was this morning. The patient also feels like her hernia is growing in size. She denies any diarrhea today but reports that she has been alternating between having very hard stools and very loose stools. She is on Plavix because she had cardiac stents placed 10 years ago    Past Medical History   I have reviewed this patient's medical history and updated it with pertinent information if needed.   Past Medical History:   Diagnosis Date     Aortic valve sclerosis     heart murmur, no AS     Arrhythmia     PAT, PVC     Aspirin allergy     Plavix use long term     Asthma      CKD (chronic kidney disease) stage 3, GFR 30-59 ml/min     x 2007 atleast     Congestive heart failure, unspecified      Depression      Diabetes mellitus (H) 2010     Diastolic dysfunction, left ventricle 2013    grade 2, nl ef     HTN (hypertension)      Migraine headache      Myocardial infarction 9/2007, cath 2013 ml    BMS: stent to OM, diag, nl EF, echo /C angia 2013 , f/u cath no lesion >40%     OA (osteoarthritis) of knee      Obesity      Rheumatoid arthritis flare (H)     prednisone     Sleep apnea     on CPAP (not using 2016)     TIA (transient ischaemic attack)      Ventral hernia, unspecified, without mention of obstruction or gangrene        Past Surgical History   I have reviewed this patient's surgical history and updated it with pertinent information if needed.  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 Taking?   ADVAIR DISKUS 100-50 MCG/DOSE diskus inhaler 2018 at AM Self No Yes   Sig: INHALE 1 PUFF EVERY 12 HOURS   Blood Glucose Monitoring Suppl (TRUE METRIX AIR GLUCOSE METER) W/DEVICE KIT  Self No No   Sig: USE AS DIRECTED   Cholecalciferol (VITAMIN D) 2000 UNITS CAPS 2018 at PM Self Yes Yes   Sig: Take by mouth daily   DULoxetine (CYMBALTA) 60 MG EC capsule 2018 at AM  No Yes   Sig: Take 1 capsule (60 mg) by mouth daily   HYDROcodone-acetaminophen (NORCO) 5-325 MG per tablet 2018 at Unknown time Self No Yes   Sig: Take 1 tablet by mouth every 8 hours as needed for moderate to severe pain   IBANdronate (BONIVA) 150 MG tablet 2018 Self Yes No   Sig: Take 150 mg by mouth every 30 days Patient takes on the first day of each month.   Menthol, Topical Analgesic, (ICY HOT EX) 2018 at AM Self Yes Yes   Sig: Apply to affected area every morning   Nitroglycerin (NITROQUICK SL)  at PRN Self Yes Yes   Sig: Place 0.4 mg under the tongue every 5 minutes as needed    TRUEPLUS LANCETS 28G MISC  Self No No   Si each 2 times daily as needed   acetaminophen (TYLENOL) 325 MG tablet  at PRN Self Yes Yes   Sig: Take 325-650 mg by mouth every 6 hours as needed for mild pain   acetaminophen (TYLENOL) 325 MG tablet 2018 at AM  Yes Yes   Sig: Take 650 mg by mouth every morning   albuterol  (PROAIR HFA/PROVENTIL HFA/VENTOLIN HFA) 108 (90 BASE) MCG/ACT Inhaler  at PRN Self No Yes   Sig: Inhale 2 puffs into the lungs every 6 hours as needed for shortness of breath / dyspnea   atorvastatin (LIPITOR) 20 MG tablet 2018 at PM Self No Yes   Sig: Take 1 tablet (20 mg) by mouth daily   blood glucose monitoring (NO BRAND SPECIFIED) meter device kit  Self No No   Sig: Use to test blood sugar 1-2 times daily or as directed.   blood glucose monitoring (TRUE METRIX BLOOD GLUCOSE TEST) test strip  Self No No   Si strip by In Vitro route daily   clopidogrel (PLAVIX) 75 MG tablet 2018 at PM Self No Yes   Sig: Take 1 tablet (75 mg) by mouth daily   fluticasone (FLONASE) 50 MCG/ACT nasal spray  at PRN Self Yes Yes   Sig: Spray 1 spray into both nostrils as needed    furosemide (LASIX) 20 MG tablet  at PRN  No Yes   Sig: Take 1 tablet (20 mg) by mouth daily as needed   lisinopril (PRINIVIL/ZESTRIL) 20 MG tablet 2018 at AM  No Yes   Sig: Take 1 tablet (20 mg) by mouth daily   metFORMIN (GLUCOPHAGE-XR) 500 MG 24 hr tablet 2018 at AM  No Yes   Sig: Take 2 tablets (1,000 mg) by mouth daily (with dinner)   Patient taking differently: Take 1,000 mg by mouth daily (with breakfast)    metoprolol succinate (TOPROL-XL) 100 MG 24 hr tablet 2018 at AM Self No Yes   Sig: TAKE 1 TABLET EVERY DAY  (DUE  FOR  OFFICE  VISIT, PLEASE MAKE APPT.)   neomycin-polymyxin-hydrocortisone (CORTISPORIN) otic solution  at PRN Self No Yes   Sig: Place 4 drops into both ears 4 times daily   Patient taking differently: Place 4 drops into both ears 4 times daily as needed (Ear infections from swimming)    nystatin-triamcinolone (MYCOLOG II) cream 2018 at AM Self No Yes   Sig: APPLY TOPICALLY TWICE DAILY   Patient taking differently: APPLY TOPICALLY TDaily   order for DME  Self Yes No   Sig: DREAMSTATION  5-15 CM/H20  NASAL WISP FABRIC   ranitidine (ZANTAC) 150 MG tablet 2018 at AM  No Yes   Sig: Take 1 tablet (150  mg) by mouth 2 times daily      Facility-Administered Medications: None     Allergies   Allergies   Allergen Reactions     Aspirin Hives     Reaction occurred during childhood.      Minocycline      Yellow Dye Allergy. Minocycline has Yellow Dye #10.     Yellow Dye Hives     Rxn to yellow tablet. Eyes swelled shut.        Social History   I have reviewed this patient's social history and updated it with pertinent information if needed. Moira Andre  reports that she quit smoking about 45 years ago. She has never used smokeless tobacco. She reports that she drinks alcohol. She reports that she does not use illicit drugs.    Family History   I have reviewed this patient's family history and updated it with pertinent information if needed.   Family History   Problem Relation Age of Onset     Neurologic Disorder Mother      MS - at 60's     C.A.D. Father       at 8o's, ? prostate ca     Breast Cancer No family hx of      Cancer - colorectal No family hx of        Review of Systems   The 10 point Review of Systems is negative other than noted in the HPI or here    Physical Exam   Temp: 98.3  F (36.8  C) Temp src: Oral BP: 194/72 Pulse: 65   Resp: 18 SpO2: 90 % O2 Device: None (Room air)    Vital Signs with Ranges  Temp:  [96.4  F (35.8  C)-98.3  F (36.8  C)] 98.3  F (36.8  C)  Pulse:  [65-82] 65  Resp:  [16-18] 18  BP: (158-194)/() 194/72  SpO2:  [90 %-95 %] 90 %  0 lbs 0 oz    General: Patient reclined in bed resting comfortably, NAD  Head: Normocephalic, atraumatic  Eyes: No icterus  Neck: Symmetric  Respiratory: Breathing unlabored  Cardiac: Skin well-perfused  GI: Obese, soft, NT, Large ventral hernia, no CVA tenderness bilat, no SPT  Skin: No visible rashes   Extremities: No LE edema, no calf pain  Neurologic: No focal deficits  Neuropsychiatric: Eyes roll back in head during interview and complains of feeling very sleepy      Data   Results for orders placed or performed during the hospital  encounter of 08/22/18 (from the past 24 hour(s))   CBC with platelets differential   Result Value Ref Range    WBC 11.7 (H) 4.0 - 11.0 10e9/L    RBC Count 4.61 3.8 - 5.2 10e12/L    Hemoglobin 12.7 11.7 - 15.7 g/dL    Hematocrit 40.0 35.0 - 47.0 %    MCV 87 78 - 100 fl    MCH 27.5 26.5 - 33.0 pg    MCHC 31.8 31.5 - 36.5 g/dL    RDW 14.4 10.0 - 15.0 %    Platelet Count 268 150 - 450 10e9/L    Diff Method Automated Method     % Neutrophils 84.3 %    % Lymphocytes 10.2 %    % Monocytes 4.6 %    % Eosinophils 0.3 %    % Basophils 0.3 %    % Immature Granulocytes 0.3 %    Nucleated RBCs 0 0 /100    Absolute Neutrophil 9.8 (H) 1.6 - 8.3 10e9/L    Absolute Lymphocytes 1.2 0.8 - 5.3 10e9/L    Absolute Monocytes 0.5 0.0 - 1.3 10e9/L    Absolute Eosinophils 0.0 0.0 - 0.7 10e9/L    Absolute Basophils 0.0 0.0 - 0.2 10e9/L    Abs Immature Granulocytes 0.0 0 - 0.4 10e9/L    Absolute Nucleated RBC 0.0    Comprehensive metabolic panel   Result Value Ref Range    Sodium 137 133 - 144 mmol/L    Potassium 4.8 3.4 - 5.3 mmol/L    Chloride 106 94 - 109 mmol/L    Carbon Dioxide 20 20 - 32 mmol/L    Anion Gap 11 3 - 14 mmol/L    Glucose 156 (H) 70 - 99 mg/dL    Urea Nitrogen 25 7 - 30 mg/dL    Creatinine 1.52 (H) 0.52 - 1.04 mg/dL    GFR Estimate 33 (L) >60 mL/min/1.7m2    GFR Estimate If Black 39 (L) >60 mL/min/1.7m2    Calcium 9.2 8.5 - 10.1 mg/dL    Bilirubin Total 0.3 0.2 - 1.3 mg/dL    Albumin 3.8 3.4 - 5.0 g/dL    Protein Total 7.8 6.8 - 8.8 g/dL    Alkaline Phosphatase 62 40 - 150 U/L    ALT 17 0 - 50 U/L    AST 13 0 - 45 U/L   Lipase   Result Value Ref Range    Lipase 225 73 - 393 U/L   Lactic acid whole blood   Result Value Ref Range    Lactic Acid 3.5 (H) 0.7 - 2.0 mmol/L   Blood culture   Result Value Ref Range    Specimen Description Blood Right Arm     Special Requests Aerobic and anaerobic bottles received     Culture Micro No growth after 8 hours    CT Abdomen Pelvis w Contrast    Narrative    CT ABDOMEN AND PELVIS WITH  CONTRAST   8/22/2018 9:55 PM     HISTORY: Diffuse abdominal pain and vomiting. Known nonreducible  ventral hernia.    COMPARISON: 7/4/2018.    TECHNIQUE: Following the uneventful administration of 100ml Isovue-370  intravenous contrast, helical sections were acquired from the top of  the diaphragm through the pubic symphysis. Coronal reconstructions  were generated. Radiation dose for this scan was reduced using  automated exposure control, adjustment of the mA and/or kV according  to the patient's size, or iterative reconstruction technique.    FINDINGS:     Abdomen: The liver, spleen, pancreas and adrenal glands are  unremarkable. A few probable cysts scattered within both kidneys. Tiny  0.3 cm calculus in the most distal aspect of the right ureter at the  ureterovesicular junction (series 3 image 69). Mild dilatation of the  right intrarenal collecting system and ureter. Slight relative delay  in enhancement of the right kidney. Slight right perinephric haziness.  Prior cholecystectomy. No enlarged lymph nodes or free fluid in the  upper abdomen. Atherosclerotic calcification in the abdominal aorta.    Scan through the lower chest is significant for mild cardiomegaly.    Pelvis: The small and large bowel are normal in caliber. A few colonic  diverticula are present without evidence of diverticulitis. The  appendix is not visualized. No bowel wall thickening, pneumatosis or  free intraperitoneal gas. The uterus is not visualized. No enlarged  lymph nodes or free fluid in the pelvis. Moderate to large midline  ventral hernia containing a several centimeter long segment of the mid  transverse colon. No evidence of bowel obstruction due to the hernia.      Impression    IMPRESSION:   1. 0.3 cm distal right ureteral calculus at the ureterovesicular  junction, resulting in mild obstruction.  2. Colonic diverticulosis without evidence of diverticulitis.  3. Moderate to large ventral hernia containing transverse colon.  No  evidence of bowel obstruction due to the hernia.    TAMIKO COLLAZO MD   Blood culture   Result Value Ref Range    Specimen Description Blood Right Arm     Special Requests Aerobic and anaerobic bottles received     Culture Micro No growth after 6 hours    Lactic acid   Result Value Ref Range    Lactic Acid 2.4 (H) 0.7 - 2.0 mmol/L   UA reflex to Microscopic   Result Value Ref Range    Color Urine Straw     Appearance Urine Clear     Glucose Urine Negative NEG^Negative mg/dL    Bilirubin Urine Negative NEG^Negative    Ketones Urine Negative NEG^Negative mg/dL    Specific Gravity Urine 1.025 1.003 - 1.035    Blood Urine Moderate (A) NEG^Negative    pH Urine 5.0 5.0 - 7.0 pH    Protein Albumin Urine 10 (A) NEG^Negative mg/dL    Urobilinogen mg/dL Normal 0.0 - 2.0 mg/dL    Nitrite Urine Negative NEG^Negative    Leukocyte Esterase Urine Negative NEG^Negative    Source Midstream Urine     RBC Urine 7 (H) 0 - 2 /HPF    WBC Urine 1 0 - 5 /HPF    Squamous Epithelial /HPF Urine 3 (H) 0 - 1 /HPF    Mucous Urine Present (A) NEG^Negative /LPF   Glucose by meter   Result Value Ref Range    Glucose 141 (H) 70 - 99 mg/dL   Glucose by meter   Result Value Ref Range    Glucose 154 (H) 70 - 99 mg/dL   Basic metabolic panel   Result Value Ref Range    Sodium 136 133 - 144 mmol/L    Potassium 5.3 3.4 - 5.3 mmol/L    Chloride 106 94 - 109 mmol/L    Carbon Dioxide 20 20 - 32 mmol/L    Anion Gap 10 3 - 14 mmol/L    Glucose 145 (H) 70 - 99 mg/dL    Urea Nitrogen 24 7 - 30 mg/dL    Creatinine 1.69 (H) 0.52 - 1.04 mg/dL    GFR Estimate 29 (L) >60 mL/min/1.7m2    GFR Estimate If Black 35 (L) >60 mL/min/1.7m2    Calcium 8.3 (L) 8.5 - 10.1 mg/dL   CBC with platelets   Result Value Ref Range    WBC 12.5 (H) 4.0 - 11.0 10e9/L    RBC Count 4.46 3.8 - 5.2 10e12/L    Hemoglobin 12.2 11.7 - 15.7 g/dL    Hematocrit 39.0 35.0 - 47.0 %    MCV 87 78 - 100 fl    MCH 27.4 26.5 - 33.0 pg    MCHC 31.3 (L) 31.5 - 36.5 g/dL    RDW 14.4 10.0 - 15.0 %     Platelet Count 251 150 - 450 10e9/L   Lactic acid   Result Value Ref Range    Lactic Acid 2.2 (H) 0.4 - 2.0 mmol/L

## 2018-08-23 NOTE — PLAN OF CARE
Problem: Patient Care Overview  Goal: Plan of Care/Patient Progress Review  Outcome: Improving  diagnostic tests and consults completed and resulted - not met  -vital signs normal or at patient baseline - not met, elevated BP  Nurse to notify provider when observation goals have been met and patient is ready for discharge.

## 2018-08-23 NOTE — PROGRESS NOTES
RECEIVING UNIT ED HANDOFF REVIEW    ED Nurse Handoff Report was reviewed by: Katerina Cueto on August 22, 2018 at 11:23 PM

## 2018-08-23 NOTE — PHARMACY-ADMISSION MEDICATION HISTORY
Admission Medication History    Admission medication history interview status for the 8/22/2018 admission is complete. See EPIC admission navigator for prior to admission medications     Medication history source reliability:Good    Actions taken by pharmacist (provider contacted, etc):None     Additional medication history information not noted on PTA med list :None    Medication reconciliation/reorder completed by provider prior to medication history? No    Time spent in this activity: 15 minutes    Prior to Admission medications    Medication Sig Last Dose Taking? Auth Provider   acetaminophen (TYLENOL) 325 MG tablet Take 650 mg by mouth every morning 8/22/2018 at AM Yes Unknown, Entered By History   acetaminophen (TYLENOL) 325 MG tablet Take 325-650 mg by mouth every 6 hours as needed for mild pain  at PRN Yes Reported, Patient   ADVAIR DISKUS 100-50 MCG/DOSE diskus inhaler INHALE 1 PUFF EVERY 12 HOURS 8/22/2018 at AM Yes Maryan Churchill MD   albuterol (PROAIR HFA/PROVENTIL HFA/VENTOLIN HFA) 108 (90 BASE) MCG/ACT Inhaler Inhale 2 puffs into the lungs every 6 hours as needed for shortness of breath / dyspnea  at PRN Yes Maryan Churchill MD   atorvastatin (LIPITOR) 20 MG tablet Take 1 tablet (20 mg) by mouth daily 8/21/2018 at PM Yes Maryan Churchill MD   Cholecalciferol (VITAMIN D) 2000 UNITS CAPS Take by mouth daily 8/21/2018 at PM Yes Reported, Patient   clopidogrel (PLAVIX) 75 MG tablet Take 1 tablet (75 mg) by mouth daily 8/21/2018 at PM Yes Maryan Churchill MD   DULoxetine (CYMBALTA) 60 MG EC capsule Take 1 capsule (60 mg) by mouth daily 8/22/2018 at AM Yes Maryan Churchill MD   fluticasone (FLONASE) 50 MCG/ACT nasal spray Spray 1 spray into both nostrils as needed   at PRN Yes Reported, Patient   furosemide (LASIX) 20 MG tablet Take 1 tablet (20 mg) by mouth daily as needed  at PRN Yes Maryan Churchill MD   HYDROcodone-acetaminophen (NORCO) 5-325 MG per tablet Take 1 tablet by mouth every 8 hours as  needed for moderate to severe pain 8/22/2018 at Unknown time Yes Maryan Churchill MD   lisinopril (PRINIVIL/ZESTRIL) 20 MG tablet Take 1 tablet (20 mg) by mouth daily 8/22/2018 at AM Yes Maryan Churchill MD   Menthol, Topical Analgesic, (ICY HOT EX) Apply to affected area every morning 8/22/2018 at AM Yes Unknown, Entered By History   metFORMIN (GLUCOPHAGE-XR) 500 MG 24 hr tablet Take 2 tablets (1,000 mg) by mouth daily (with dinner)  Patient taking differently: Take 1,000 mg by mouth daily (with breakfast)  8/22/2018 at AM Yes Maryan Churchill MD   metoprolol succinate (TOPROL-XL) 100 MG 24 hr tablet TAKE 1 TABLET EVERY DAY  (DUE  FOR  OFFICE  VISIT, PLEASE MAKE APPT.) 8/22/2018 at AM Yes Maryan Churchill MD   neomycin-polymyxin-hydrocortisone (CORTISPORIN) otic solution Place 4 drops into both ears 4 times daily  Patient taking differently: Place 4 drops into both ears 4 times daily as needed (Ear infections from swimming)   at PRN Yes Maryan Churchill MD   Nitroglycerin (NITROQUICK SL) Place 0.4 mg under the tongue every 5 minutes as needed   at PRN Yes Reported, Patient   nystatin-triamcinolone (MYCOLOG II) cream APPLY TOPICALLY TWICE DAILY  Patient taking differently: APPLY TOPICALLY TDaily 8/22/2018 at AM Yes Maryan Churchill MD   ranitidine (ZANTAC) 150 MG tablet Take 1 tablet (150 mg) by mouth 2 times daily 8/22/2018 at AM Yes Maryan Churchill MD   blood glucose monitoring (NO BRAND SPECIFIED) meter device kit Use to test blood sugar 1-2 times daily or as directed.   Maryan Churchill MD   blood glucose monitoring (TRUE METRIX BLOOD GLUCOSE TEST) test strip 1 strip by In Vitro route daily   Maryan Churchill MD   Blood Glucose Monitoring Suppl (TRUE METRIX AIR GLUCOSE METER) W/DEVICE KIT USE AS DIRECTED   Maryan Churchill MD   IBANdronate (BONIVA) 150 MG tablet Take 150 mg by mouth every 30 days Patient takes on the first day of each month. 8/1/2018  Unknown, Entered By History   order for DME  DREAMSTATION  5-15 CM/H20  NASAL WISP FABRIC   Reported, Patient   TRUEPLUS LANCETS 28G MISC 1 each 2 times daily as needed   Maryan Churchill MD David S. Cline, PharmD

## 2018-08-23 NOTE — H&P
History and Physical     Moira Andre MRN# [id number]   YOB: 1933 Age: 84 year old      Date of Admission:  8/22/2018    Primary care provider: Maryan Churchill          Assessment and Plan:   Moira Andre is a 84 year old female with a known ventral hernia, DM type 2, depression, ELIGIO who presented with acute abdominal pain very similar to prior episodes of abdominal pain with hernia.  Patient was just hospitalized from life fourth to July 6 with symptomatic ventral hernia.  She was seen by surgery and plan was for elective surgery as outpatient but she has not followed up with them yet.  She states this pain is very similar.  CT shows a 0.3 cm distal right ureteric calculus at the ureterovesicular junction resulting in mild obstruction moderate to large hernia is seen containing transverse colon no evidence of bowel obstruction.     Nephrolithiasis with acute abdominal pain -given the steroid severity of her pain patient did not feel she could go home and therefore admitted under observation, to ensure adequate pain control.  The stone is small and should pass on its own.  -Ordered nifedipine to promote stone passage (patient is allergic to yellow dye, which can be in Flomax)  -Strain urine  -For pain control ordered oxycodone 5-10 mg every 3 hours as needed, 3 mg of IV Dilaudid is available every 2 hours for breakthrough.  Did try the hydrocodone she takes occasional for chronic pain at home and did not help. Discussed this escalation is only for short-term pain control for stone.   -Cannot take NSAIDs due to kidney disease  -Urology consulted. Likely only needs follow up.   -Hold plavix for now.     Ventral hernia with recurring abdominal pain, with plan for elective repair as outpatient after holding plavix. Pain very similar to prior ventral hernia pain.. ? If contributing.     Mild lactic acid elevation -suspect secondary to dehydration with metformin possibly contributing. I am not  concerned for poor perfusion, or sepsis. She is hemodynamically stable without signs or symptoms of infection.  Resolving after IV fluids (3.5-2.4)  - Recheck in AM, if remains elevated, consider further fluids.     Mild acute kidney injury context of chronic kidney disease -creatinine 1.5 (baseline around 1.3)  -She received 1 L of normal saline in the ED. her last admission she developed swelling requiring diuresis so we will hold off on further IV fluids  -Holding lisinopril, lasix  -Encourage p.o. Fluids  -Recheck ordered for the a.m.    HTN - BP elevated.   - continue PTA metoprolol  - started nifedipine as above   - Holding lisinopril and lasix due to mild TIFFANI, likely resume in AM.     CAD, status post bare metal stent x 2 to the OM as all well diagonal in 2007. On plavix due to ASA allergy.   Diastolic HF - EF 50-55% on angiogram in 2017, fluid overload during last hospitalization  - Holding plavix as above.   - continue PTA metoprolol, atorvastatin  - holding lisinopril, lasix for TIFFANI.   - holding further fluids as above.     Diabetes mellitus type 2 A1c 7.6 in July 2018 blood sugar 156 at admission  -Holding metformin while lactic acid is elevated.   -QID BS checks with sliding scale insulin     GERD  - PTA ranitidine    ELIGIO  - CPAP per home settings.     # Pain Assessment:  Current Pain Score 8/22/2018   Patient currently in pain? -   Pain score (0-10) 5   Pain location -   Pain descriptors -   - Moira is experiencing pain due to above. Pain management was discussed and the plan was created in a collaborative fashion.  Moira's response to the current recommendations: mixed response  - Please see the plan for pain management as documented above    Depression   - Continue PTA cymbalta    Dispo  - Admitted under observation to ensure pain control. Anticipate discharge tomorrow.                       Chief Complaint:   Abdominal pain         History of Present Illness:   Moira Andre is a 84 year old female  with a known ventral hernia, DM type 2, depression, ELIGIO who presented with acute abdominal pain very similar to prior episodes of abdominal pain with hernia.  Patient was just hospitalized from life fourth to July 6 with symptomatic ventral hernia.  She was seen by surgery and plan was for elective surgery as outpatient but she has not followed up with them yet.  She states this pain is very similar.     Abdominal pain at the site of her hernia and she was planning on having a hernia repair (see note below) but has not had a hernia repair operation because she is on Plavix. The patient states that this morning she started experiencing abdominal pain at the site of the hernia. She also vomited bile once and has continuously felt nauseated. She notes that her bowel movements have been hard lately, her last bowel movement was this morning. She denies any diarrhea today but reports that she has been alternating between having very hard stools and very loose stools.  She denies any urinary urinary symptoms no pain or blood in the urine.  No fevers or chills shortness of breath or chest pain.    In the ED her temperature was 97.9, pulse 82, blood pressure initially elevated at 191/100 going down to 158/87, respiratory rate 16, oxygenation 95% in room air.     CT  IMPRESSION:   1. 0.3 cm distal right ureteral calculus at the ureterovesicular  junction, resulting in mild obstruction.  2. Colonic diverticulosis without evidence of diverticulitis.  3. Moderate to large ventral hernia containing transverse colon. No  evidence of bowel obstruction due to the hernia.    Laboratory:  CBC: WBC: 11.7, HGB: 12.7, PLT: 268  CMP: Glucose 156 (H), Creatinine: 1.52 (H), GFR: 33 (L), o/w WNL   Lipase: 225  2015: Lactic acid whole blood: 3.5 (H)  2210: Lactic acid whole blood: 2.4 (H)  Blood culture x2: pending  UA with micro: Blood: moderate, Protein albumin: 10, RBC: 7 (H), Squamous epithelial: 3 (H), Mucous: present o/w  negative     Interventions:  2019 - NS 1L IV  2026 - Morphine 4 mg IV  2026 - Zofran 4 mg IV  2228 - Morphine 4 mg IV  2228 - Zofran 4 mg IV               Past Medical History:   1.  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  Depression.   3.  Obesity.   4.  Chronic pain.   5.  Coronary artery disease, status post bare metal stent x 2 to the OM as all well diagonal in 2007.  The patient's most recent angiogram was performed in 2017 due to progressive exertional dyspnea, which indicated a 50%-60% proximal circumflex lesion which was negative for FFR as well as IFR and thus deemed to be a nonflow limiting lesion.  The remainder of her vessels had 30% or less disease.  The patient is maintained on long-term Plavix due to an aspirin allergy.   6.  Intermittent asthma.   7.  Hypertension.   8.  Hyperlipidemia.   9.  Obstructive sleep apnea, uses CPAP.   10.  Chronic kidney disease (CKD), stage 3.  Baseline creatinine on chart review appears to be between 1.1 and 1.2.     11.  Recent admission from July 4 - July 6 for pain associated with non-reducible ventral hernia. She was seen by surgery with plan to do surgery on elective basis but never followed up.           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 Father       at 8o's, ? prostate ca     Breast Cancer No family hx of      Cancer - colorectal No family hx of              Immunizations:     Immunization History   Administered Date(s) Administered     Influenza (High Dose) 3 valent vaccine 10/02/2013, 10/02/2014, 10/01/2015, 2016     Influenza (IIV3) PF 10/15/2012     Pneumo Conj 13-V (2010&after) 2015     Pneumococcal 23 valent 2012     TDAP Vaccine (Adacel) 2012            Allergies:     Allergies   Allergen Reactions     Aspirin Hives     Reaction occurred during childhood.      Minocycline      Yellow Dye Allergy. Minocycline has Yellow Dye #10.     Yellow Dye Hives     Rxn to yellow tablet. Eyes swelled shut.              Medications:     Prescription Medications as of 2018             acetaminophen (TYLENOL) 325 MG tablet Take 650 mg by mouth every morning    acetaminophen (TYLENOL) 325 MG tablet Take 325-650 mg by mouth every 6 hours as needed for mild pain    ADVAIR DISKUS 100-50 MCG/DOSE diskus inhaler INHALE 1 PUFF EVERY 12 HOURS    albuterol (PROAIR HFA/PROVENTIL HFA/VENTOLIN HFA) 108 (90 BASE) MCG/ACT Inhaler Inhale 2 puffs into the lungs every 6 hours as needed for shortness of breath / dyspnea    atorvastatin (LIPITOR) 20 MG tablet Take 1 tablet (20 mg) by mouth daily    Cholecalciferol (VITAMIN D) 2000 UNITS CAPS Take by mouth daily    clopidogrel (PLAVIX) 75 MG tablet Take 1 tablet (75 mg) by mouth daily    DULoxetine (CYMBALTA) 60 MG EC capsule Take 1 capsule (60 mg) by mouth daily    fluticasone (FLONASE) 50 MCG/ACT nasal spray Spray 1 spray into both nostrils as needed     furosemide (LASIX) 20 MG tablet  Take 1 tablet (20 mg) by mouth daily as needed    HYDROcodone-acetaminophen (NORCO) 5-325 MG per tablet Take 1 tablet by mouth every 8 hours as needed for moderate to severe pain    lisinopril (PRINIVIL/ZESTRIL) 20 MG tablet Take 1 tablet (20 mg) by mouth daily    Menthol, Topical Analgesic, (ICY HOT EX) Apply to affected area every morning    metFORMIN (GLUCOPHAGE-XR) 500 MG 24 hr tablet Take 2 tablets (1,000 mg) by mouth daily (with dinner)    metoprolol succinate (TOPROL-XL) 100 MG 24 hr tablet TAKE 1 TABLET EVERY DAY  (DUE  FOR  OFFICE  VISIT, PLEASE MAKE APPT.)    neomycin-polymyxin-hydrocortisone (CORTISPORIN) otic solution Place 4 drops into both ears 4 times daily    Nitroglycerin (NITROQUICK SL) Place 0.4 mg under the tongue every 5 minutes as needed     nystatin-triamcinolone (MYCOLOG II) cream APPLY TOPICALLY TWICE DAILY    ranitidine (ZANTAC) 150 MG tablet Take 1 tablet (150 mg) by mouth 2 times daily    blood glucose monitoring (NO BRAND SPECIFIED) meter device kit Use to test blood sugar 1-2 times daily or as directed.    blood glucose monitoring (TRUE METRIX BLOOD GLUCOSE TEST) test strip 1 strip by In Vitro route daily    Blood Glucose Monitoring Suppl (TRUE METRIX AIR GLUCOSE METER) W/DEVICE KIT USE AS DIRECTED    IBANdronate (BONIVA) 150 MG tablet Take 150 mg by mouth every 30 days Patient takes on the first day of each month.    order for DME DREAMSTATION  5-15 CM/H20  NASAL WISP FABRIC    TRUEPLUS LANCETS 28G MISC 1 each 2 times daily as needed      Facility Administered Medications as of 8/22/2018             0.9% sodium chloride BOLUS Inject 1,000 mLs into the vein once    0.9% sodium chloride BOLUS Inject 1,000 mLs into the vein once    iopamidol (ISOVUE-370) solution 100 mL Inject 100 mLs into the vein once    morphine (PF) injection 2 mg Inject 2 mg into the vein once    morphine (PF) injection 4 mg Inject 4 mg into the vein once    ondansetron (ZOFRAN) injection 4 mg Inject 2 mLs (4 mg)  into the vein every 30 minutes as needed for nausea or vomiting    Saline 80 mLs once                Review of Systems:   The 10 point Review of Systems is negative other than noted in the HPI              Physical Exam:   Blood pressure 158/87, pulse 82, temperature 97.9  F (36.6  C), temperature source Oral, resp. rate 16, SpO2 94 %, not currently breastfeeding.    Constitutional:   awake, alert, cooperative, no apparent distress, and appears stated age     Eyes:   Lids and lashes normal, extra ocular muscles intact, sclera clear, conjunctiva normal     ENT:   Normocephalic, without obvious abnormality, atramatic, oral pharynx with moist mucus membranes, tonsils without erythema or exudates .     Neck:   Supple large neck     Lungs:   No increased work of breathing, good air exchange, clear to auscultation bilaterally, no crackles or wheezing     Cardiovascular:   Regular rate and rhythm, normal S1 and S2, no S3 or S4, and no murmur noted. Extremities are warm. 1+ edema     Abdomen:   Normal bowel sounds, soft, morbid obesity with palpable large ventral hernia in RLQ,, not reproducible and some mild tenderness over this area.     Musculoskeletal:   There is no redness, warmth, or swelling of the joints. Normal build.      Neurologic:   Awake, alert, oriented to name, place and time.  Cranial nerves II-XII are grossly intact.  Moving a 4 extremities without gross focal weakness.     Neuropsychiatric:   General: normal, calm and normal eye contact     Skin:   no bruising or bleeding, no redness, warmth, or swelling and no rashes            Data:     Results for orders placed or performed during the hospital encounter of 08/22/18   CT Abdomen Pelvis w Contrast    Narrative    CT ABDOMEN AND PELVIS WITH CONTRAST   8/22/2018 9:55 PM     HISTORY: Diffuse abdominal pain and vomiting. Known nonreducible  ventral hernia.    COMPARISON: 7/4/2018.    TECHNIQUE: Following the uneventful administration of 100ml  Isovue-370  intravenous contrast, helical sections were acquired from the top of  the diaphragm through the pubic symphysis. Coronal reconstructions  were generated. Radiation dose for this scan was reduced using  automated exposure control, adjustment of the mA and/or kV according  to the patient's size, or iterative reconstruction technique.    FINDINGS:     Abdomen: The liver, spleen, pancreas and adrenal glands are  unremarkable. A few probable cysts scattered within both kidneys. Tiny  0.3 cm calculus in the most distal aspect of the right ureter at the  ureterovesicular junction (series 3 image 69). Mild dilatation of the  right intrarenal collecting system and ureter. Slight relative delay  in enhancement of the right kidney. Slight right perinephric haziness.  Prior cholecystectomy. No enlarged lymph nodes or free fluid in the  upper abdomen. Atherosclerotic calcification in the abdominal aorta.    Scan through the lower chest is significant for mild cardiomegaly.    Pelvis: The small and large bowel are normal in caliber. A few colonic  diverticula are present without evidence of diverticulitis. The  appendix is not visualized. No bowel wall thickening, pneumatosis or  free intraperitoneal gas. The uterus is not visualized. No enlarged  lymph nodes or free fluid in the pelvis. Moderate to large midline  ventral hernia containing a several centimeter long segment of the mid  transverse colon. No evidence of bowel obstruction due to the hernia.      Impression    IMPRESSION:   1. 0.3 cm distal right ureteral calculus at the ureterovesicular  junction, resulting in mild obstruction.  2. Colonic diverticulosis without evidence of diverticulitis.  3. Moderate to large ventral hernia containing transverse colon. No  evidence of bowel obstruction due to the hernia.   CBC with platelets differential   Result Value Ref Range    WBC 11.7 (H) 4.0 - 11.0 10e9/L    RBC Count 4.61 3.8 - 5.2 10e12/L    Hemoglobin 12.7  11.7 - 15.7 g/dL    Hematocrit 40.0 35.0 - 47.0 %    MCV 87 78 - 100 fl    MCH 27.5 26.5 - 33.0 pg    MCHC 31.8 31.5 - 36.5 g/dL    RDW 14.4 10.0 - 15.0 %    Platelet Count 268 150 - 450 10e9/L    Diff Method Automated Method     % Neutrophils 84.3 %    % Lymphocytes 10.2 %    % Monocytes 4.6 %    % Eosinophils 0.3 %    % Basophils 0.3 %    % Immature Granulocytes 0.3 %    Nucleated RBCs 0 0 /100    Absolute Neutrophil 9.8 (H) 1.6 - 8.3 10e9/L    Absolute Lymphocytes 1.2 0.8 - 5.3 10e9/L    Absolute Monocytes 0.5 0.0 - 1.3 10e9/L    Absolute Eosinophils 0.0 0.0 - 0.7 10e9/L    Absolute Basophils 0.0 0.0 - 0.2 10e9/L    Abs Immature Granulocytes 0.0 0 - 0.4 10e9/L    Absolute Nucleated RBC 0.0    Comprehensive metabolic panel   Result Value Ref Range    Sodium 137 133 - 144 mmol/L    Potassium 4.8 3.4 - 5.3 mmol/L    Chloride 106 94 - 109 mmol/L    Carbon Dioxide 20 20 - 32 mmol/L    Anion Gap 11 3 - 14 mmol/L    Glucose 156 (H) 70 - 99 mg/dL    Urea Nitrogen 25 7 - 30 mg/dL    Creatinine 1.52 (H) 0.52 - 1.04 mg/dL    GFR Estimate 33 (L) >60 mL/min/1.7m2    GFR Estimate If Black 39 (L) >60 mL/min/1.7m2    Calcium 9.2 8.5 - 10.1 mg/dL    Bilirubin Total 0.3 0.2 - 1.3 mg/dL    Albumin 3.8 3.4 - 5.0 g/dL    Protein Total 7.8 6.8 - 8.8 g/dL    Alkaline Phosphatase 62 40 - 150 U/L    ALT 17 0 - 50 U/L    AST 13 0 - 45 U/L   Lipase   Result Value Ref Range    Lipase 225 73 - 393 U/L   Lactic acid whole blood   Result Value Ref Range    Lactic Acid 3.5 (H) 0.7 - 2.0 mmol/L   Lactic acid   Result Value Ref Range    Lactic Acid 2.4 (H) 0.7 - 2.0 mmol/L   UA reflex to Microscopic   Result Value Ref Range    Color Urine Straw     Appearance Urine Clear     Glucose Urine Negative NEG^Negative mg/dL    Bilirubin Urine Negative NEG^Negative    Ketones Urine Negative NEG^Negative mg/dL    Specific Gravity Urine 1.025 1.003 - 1.035    Blood Urine Moderate (A) NEG^Negative    pH Urine 5.0 5.0 - 7.0 pH    Protein Albumin Urine 10 (A)  NEG^Negative mg/dL    Urobilinogen mg/dL Normal 0.0 - 2.0 mg/dL    Nitrite Urine Negative NEG^Negative    Leukocyte Esterase Urine Negative NEG^Negative    Source Midstream Urine     RBC Urine 7 (H) 0 - 2 /HPF    WBC Urine 1 0 - 5 /HPF    Squamous Epithelial /HPF Urine 3 (H) 0 - 1 /HPF    Mucous Urine Present (A) NEG^Negative /LPF

## 2018-08-23 NOTE — PROGRESS NOTES
[facility]    Hospitalist Progress Note    Assessment & Plan   Moira Andre is a 84 year old female with a known ventral hernia, DM type 2, depression, ELIGIO who presented with acute abdominal pain very similar to prior episodes of abdominal pain with hernia.  Patient was just hospitalized from [facility] to July 6 with symptomatic ventral hernia.  She was seen by surgery and plan was for elective surgery as outpatient but she has not followed up with them yet.  She states this pain is very similar.  CT shows a 0.3 cm distal right ureteric calculus at the ureterovesicular junction resulting in mild obstruction moderate to large hernia is seen containing transverse colon no evidence of bowel obstruction.      Nephrolithiasis with mild obstruction  As identified on CT A/P, 0.3cm distal right ureteral calculus at ureterovesical junction  Given the severity of her pain patient did not feel she could go home and therefore admitted under observation, to ensure adequate pain control.   - Ordered nifedipine to promote stone passage (patient is allergic to yellow dye, which can be in Flomax)  - Strain urine  - For pain control ordered oxycodone 5-10 mg every 3 hours as needed, 3 mg of IV Dilaudid is available every 2 hours for breakthrough.  Did try the hydrocodone she takes occasional for chronic pain at home and did not help. Discussed this escalation is only for short-term pain control for stone.   - Cannot take NSAIDs due to kidney disease  - Urology consulted  - Holding plavix    Lactic acid elevation  Leukocytosis  Concern for pyelonephritis  Lactic acid 3.5 on admission, improved to 2.4 with IVF. UA with minimal pyuria, no overt evidence of infection although WBC this AM is 12.5 (11.7) and pt with perinephric haziness on CT.  - NS at 100cc/h for 12h given hx fluid overload during last hospitalization requiring diuresis  - Repeat CBC, lactate in AM  - Add on UC  - Empirically treat for pyelonephritis  with 2gm IV Rocephin q24h     Ventral hernia   With recurring abdominal pain, with plan for elective repair as outpatient after holding plavix x7d. No evidence of obstruction on CT. Pain very similar to prior ventral hernia pain. Improving with pain medication, ?contributing. Patient was instructed to follow-up as an outpatient to schedule in future however has yet to make appointment.      Mild acute kidney injury in context of chronic kidney disease   Creatinine 1.5--1.69 (baseline around 1.3)  -She received 1 L of normal saline in the ED. her last admission she developed swelling requiring diuresis so we will hold off on further IV fluids  - Holding lisinopril, lasix  - Encourage p.o. Fluids  - Repeat BMP in AM     HTN   BP elevated.   - continue PTA metoprolol  - Started nifedipine as above   - Holding lisinopril and lasix due to mild TIFFANI  - PRN hydralazine for SBP > 180     CAD, status post bare metal stent x 2 to the OM as all well diagonal in 2007. On plavix due to ASA allergy.   Diastolic HF   EF 50-55% on angiogram in 2017, fluid overload during last hospitalization  - Holding plavix as above.   - Continue PTA metoprolol, atorvastatin  - holding lisinopril, lasix for TIFFANI.   - Cautious use of IVF as above     Diabetes mellitus type 2   A1c 7.6 in July 2018 blood sugar 156 at admission  - Holding metformin while lactic acid is elevated  - QID BS checks with sliding scale insulin      GERD  - PTA ranitidine     ELIGIO  - CPAP per home settings.     # Pain Assessment:  Current Pain Score 8/23/2018   Patient currently in pain? yes   Pain score (0-10) 8   Pain location Abdomen   Pain descriptors -   - Moira is experiencing pain due to nephrolithiasis. Pain management was discussed and the plan was created in a collaborative fashion.  Moira's response to the current recommendations: engaged  - Please see the plan for pain management as documented above      DVT Prophylaxis: Pneumatic Compression Devices and Ambulate  every shift  Code Status: Full Code    Disposition: Expected discharge in 1-2 days once renal function improved, pain controlled with oral medications, tolerating oral intake. Given worsening renal function with leukocytosis and concern of possible pyelonephritis, limited tolerance of po intake will ask UR to evaluate for inpatient status.    Mohan Gonzales PA-C    Interval History   Pt seen & evaluated, reports feeling improved from admission with pain medications. Has had minimal oral intake 2/2 nausea throughout day. Solely has tolerated a piece of toast and some broth.    -Data reviewed today: I reviewed all new labs and imaging results over the last 24 hours. I personally reviewed no images or EKG's today.    Physical Exam   Temp: 98.3  F (36.8  C) Temp src: Oral BP: 194/72 Pulse: 65   Resp: 16 SpO2: 90 % O2 Device: None (Room air)    There were no vitals filed for this visit.  Vital Signs with Ranges  Temp:  [96.4  F (35.8  C)-98.3  F (36.8  C)] 98.3  F (36.8  C)  Pulse:  [65-82] 65  Resp:  [16-18] 16  BP: (158-194)/() 194/72  SpO2:  [90 %-95 %] 90 %  I/O last 3 completed shifts:  In: -   Out: 200 [Urine:200]    GEN: well-developed, well-nourished, appears comfortable  PULM: lungs CTA bilaterally, no increased work of breathing, no wheeze, crackles, rhonchi  CV: RRR, S1 & S2, no murmur  GI: soft, nontender, nondistended, +BS in all 4 quadrants  SKIN: warm & dry without rash, no pedal edema    Medications     sodium chloride         cefTRIAXone  2 g Intravenous Q24H     DULoxetine  60 mg Oral Daily     fluticasone-vilanterol  1 puff Inhalation Daily     insulin aspart  1-7 Units Subcutaneous TID AC     insulin aspart  1-5 Units Subcutaneous At Bedtime     metoprolol succinate  100 mg Oral Daily     NIFEdipine ER osmotic  30 mg Oral Daily     ranitidine  150 mg Oral At Bedtime       Data     Recent Labs  Lab 08/23/18  0845 08/22/18 2015   WBC 12.5* 11.7*   HGB 12.2 12.7   MCV 87 87    268     137   POTASSIUM 5.3 4.8   CHLORIDE 106 106   CO2 20 20   BUN 24 25   CR 1.69* 1.52*   ANIONGAP 10 11   TELLY 8.3* 9.2   * 156*   ALBUMIN  --  3.8   PROTTOTAL  --  7.8   BILITOTAL  --  0.3   ALKPHOS  --  62   ALT  --  17   AST  --  13   LIPASE  --  225       Recent Results (from the past 24 hour(s))   CT Abdomen Pelvis w Contrast    Narrative    CT ABDOMEN AND PELVIS WITH CONTRAST   8/22/2018 9:55 PM     HISTORY: Diffuse abdominal pain and vomiting. Known nonreducible  ventral hernia.    COMPARISON: 7/4/2018.    TECHNIQUE: Following the uneventful administration of 100ml Isovue-370  intravenous contrast, helical sections were acquired from the top of  the diaphragm through the pubic symphysis. Coronal reconstructions  were generated. Radiation dose for this scan was reduced using  automated exposure control, adjustment of the mA and/or kV according  to the patient's size, or iterative reconstruction technique.    FINDINGS:     Abdomen: The liver, spleen, pancreas and adrenal glands are  unremarkable. A few probable cysts scattered within both kidneys. Tiny  0.3 cm calculus in the most distal aspect of the right ureter at the  ureterovesicular junction (series 3 image 69). Mild dilatation of the  right intrarenal collecting system and ureter. Slight relative delay  in enhancement of the right kidney. Slight right perinephric haziness.  Prior cholecystectomy. No enlarged lymph nodes or free fluid in the  upper abdomen. Atherosclerotic calcification in the abdominal aorta.    Scan through the lower chest is significant for mild cardiomegaly.    Pelvis: The small and large bowel are normal in caliber. A few colonic  diverticula are present without evidence of diverticulitis. The  appendix is not visualized. No bowel wall thickening, pneumatosis or  free intraperitoneal gas. The uterus is not visualized. No enlarged  lymph nodes or free fluid in the pelvis. Moderate to large midline  ventral hernia containing a  several centimeter long segment of the mid  transverse colon. No evidence of bowel obstruction due to the hernia.      Impression    IMPRESSION:   1. 0.3 cm distal right ureteral calculus at the ureterovesicular  junction, resulting in mild obstruction.  2. Colonic diverticulosis without evidence of diverticulitis.  3. Moderate to large ventral hernia containing transverse colon. No  evidence of bowel obstruction due to the hernia.    TAMIKO COLLAZO MD

## 2018-08-24 VITALS
HEART RATE: 72 BPM | SYSTOLIC BLOOD PRESSURE: 126 MMHG | RESPIRATION RATE: 18 BRPM | DIASTOLIC BLOOD PRESSURE: 52 MMHG | OXYGEN SATURATION: 92 % | TEMPERATURE: 98.6 F

## 2018-08-24 LAB
ANION GAP SERPL CALCULATED.3IONS-SCNC: 9 MMOL/L (ref 3–14)
BACTERIA SPEC CULT: NORMAL
BACTERIA SPEC CULT: NORMAL
BUN SERPL-MCNC: 22 MG/DL (ref 7–30)
CALCIUM SERPL-MCNC: 8.2 MG/DL (ref 8.5–10.1)
CHLORIDE SERPL-SCNC: 109 MMOL/L (ref 94–109)
CK SERPL-CCNC: 52 U/L (ref 30–225)
CO2 SERPL-SCNC: 23 MMOL/L (ref 20–32)
CREAT SERPL-MCNC: 1.55 MG/DL (ref 0.52–1.04)
ERYTHROCYTE [DISTWIDTH] IN BLOOD BY AUTOMATED COUNT: 14.5 % (ref 10–15)
GFR SERPL CREATININE-BSD FRML MDRD: 32 ML/MIN/1.7M2
GLUCOSE BLDC GLUCOMTR-MCNC: 115 MG/DL (ref 70–99)
GLUCOSE BLDC GLUCOMTR-MCNC: 93 MG/DL (ref 70–99)
GLUCOSE SERPL-MCNC: 122 MG/DL (ref 70–99)
HCT VFR BLD AUTO: 36 % (ref 35–47)
HGB BLD-MCNC: 11.2 G/DL (ref 11.7–15.7)
LACTATE BLD-SCNC: 2.1 MMOL/L (ref 0.7–2)
MCH RBC QN AUTO: 27.3 PG (ref 26.5–33)
MCHC RBC AUTO-ENTMCNC: 31.1 G/DL (ref 31.5–36.5)
MCV RBC AUTO: 88 FL (ref 78–100)
PLATELET # BLD AUTO: 257 10E9/L (ref 150–450)
POTASSIUM SERPL-SCNC: 4.4 MMOL/L (ref 3.4–5.3)
PROCALCITONIN SERPL-MCNC: <0.05 NG/ML
RBC # BLD AUTO: 4.11 10E12/L (ref 3.8–5.2)
SODIUM SERPL-SCNC: 141 MMOL/L (ref 133–144)
SPECIMEN SOURCE: NORMAL
WBC # BLD AUTO: 8.5 10E9/L (ref 4–11)

## 2018-08-24 PROCEDURE — 82550 ASSAY OF CK (CPK): CPT | Performed by: PHYSICIAN ASSISTANT

## 2018-08-24 PROCEDURE — G0378 HOSPITAL OBSERVATION PER HR: HCPCS

## 2018-08-24 PROCEDURE — 36415 COLL VENOUS BLD VENIPUNCTURE: CPT | Performed by: HOSPITALIST

## 2018-08-24 PROCEDURE — 99207 ZZC CDG-CODE CATEGORY CHANGED: CPT | Performed by: HOSPITALIST

## 2018-08-24 PROCEDURE — 99217 ZZC OBSERVATION CARE DISCHARGE: CPT | Performed by: HOSPITALIST

## 2018-08-24 PROCEDURE — 82550 ASSAY OF CK (CPK): CPT | Performed by: HOSPITALIST

## 2018-08-24 PROCEDURE — 84145 PROCALCITONIN (PCT): CPT | Performed by: PHYSICIAN ASSISTANT

## 2018-08-24 PROCEDURE — 83605 ASSAY OF LACTIC ACID: CPT | Performed by: HOSPITALIST

## 2018-08-24 PROCEDURE — 85027 COMPLETE CBC AUTOMATED: CPT | Performed by: PHYSICIAN ASSISTANT

## 2018-08-24 PROCEDURE — 00000146 ZZHCL STATISTIC GLUCOSE BY METER IP

## 2018-08-24 PROCEDURE — 36415 COLL VENOUS BLD VENIPUNCTURE: CPT | Performed by: PHYSICIAN ASSISTANT

## 2018-08-24 PROCEDURE — 87086 URINE CULTURE/COLONY COUNT: CPT | Performed by: PHYSICIAN ASSISTANT

## 2018-08-24 PROCEDURE — 80048 BASIC METABOLIC PNL TOTAL CA: CPT | Performed by: PHYSICIAN ASSISTANT

## 2018-08-24 PROCEDURE — 25000132 ZZH RX MED GY IP 250 OP 250 PS 637: Performed by: INTERNAL MEDICINE

## 2018-08-24 PROCEDURE — 96361 HYDRATE IV INFUSION ADD-ON: CPT

## 2018-08-24 RX ORDER — LISINOPRIL 20 MG/1
20 TABLET ORAL DAILY
Qty: 90 TABLET | Refills: 0 | COMMUNITY
Start: 2018-08-24 | End: 2018-08-31

## 2018-08-24 RX ORDER — NIFEDIPINE 30 MG
30 TABLET, EXTENDED RELEASE ORAL DAILY
Qty: 14 TABLET | Refills: 0 | Status: ON HOLD | OUTPATIENT
Start: 2018-08-25 | End: 2018-11-01

## 2018-08-24 RX ADMIN — DULOXETINE HYDROCHLORIDE 60 MG: 30 CAPSULE, DELAYED RELEASE ORAL at 08:29

## 2018-08-24 RX ADMIN — METOPROLOL SUCCINATE 100 MG: 100 TABLET, EXTENDED RELEASE ORAL at 08:27

## 2018-08-24 RX ADMIN — NIFEDIPINE 30 MG: 30 TABLET, FILM COATED, EXTENDED RELEASE ORAL at 08:30

## 2018-08-24 RX ADMIN — FLUTICASONE FUROATE AND VILANTEROL TRIFENATATE 1 PUFF: 100; 25 POWDER RESPIRATORY (INHALATION) at 08:30

## 2018-08-24 NOTE — PROVIDER NOTIFICATION
MD Notification    Person notified: MD    Person Name: Dr Meyers    Date/Time: 8/24 1127    Interaction: Page    Purpose of Notification: Urology signed off, patient denies pain and tolerating diet.  OK to discharge today?    Orders Received: Discharge orders placed, patient left unit [id number].

## 2018-08-24 NOTE — PROGRESS NOTES
[facility]    Urology Progress Note     Assessment & Plan   Moira Andre is a 84 year old female who was admitted on 8/22/2018. Ventral hernia, abdominal pain-- now resolved, 3mm stone at right UVJ.     Plan:   -No evidence of stone yet in strainer, given pain is now resolved, likely that she has either passed the stone or that her discomfort was not related to stone but rather more d/t constipation and ventral hernia.    -Nifedipine for ureteral dilation   -Stone highly likely to pass spontaneously   -Follow up in urology office prn     Urology will sign off for now.   Please call with any questions or concerns.   Thank you for the opportunity to participate in this patient's care.      Preethi Rivero PA-C  [facility]  [address] [place] [id number] [phone]  [email or web address]  Text Page (7am to 5pm)    Interval History   Denies pain/discomfort this AM   No evidence of stone in strainer     AVSS  WBC 8.5  Creat 1.69-->1.55    Physical Exam   Temp: 98.6  F (37  C) Temp src: Oral BP: 126/52 Pulse: 72 Heart Rate: 79 Resp: 18 SpO2: 92 % O2 Device: None (Room air)    There were no vitals filed for this visit.  Vital Signs with Ranges  Temp:  [96.3  F (35.7  C)-98.6  F (37  C)] 98.6  F (37  C)  Pulse:  [71-72] 72  Heart Rate:  [79] 79  Resp:  [18] 18  BP: (126-151)/(52-77) 126/52  SpO2:  [91 %-92 %] 92 %  I/O last 3 completed shifts:  In: -   Out: 150 [Urine:150]    Constitutional: Sitting up in bed, NAD  Eyes: no icterus  ENT: normocephalic  Respiratory: breathing unlabored   Cardiovascular: chest wall symmetric   GI: obese and soft, tender over hernia in mid-right abdomen, no suprapubic or CVA tenderness to palpation, non distended   Lymph/Hematologic: no pedal edema or calve tenderness   Genitourinary: defer  Skin: well perfused   Musculoskeletal: moves all extremities   Neurologic: no focal deficits   Neuropsychiatric:  A&Ox3    Medications       cefTRIAXone  2 g Intravenous Q24H     DULoxetine  60 mg Oral Daily     fluticasone-vilanterol  1 puff Inhalation Daily     insulin aspart  1-7 Units Subcutaneous TID AC     insulin aspart  1-5 Units Subcutaneous At Bedtime     metoprolol succinate  100 mg Oral Daily     NIFEdipine ER osmotic  30 mg Oral Daily     ranitidine  150 mg Oral At Bedtime       Data   Results for orders placed or performed during the hospital encounter of 08/22/18 (from the past 24 hour(s))   Glucose by meter   Result Value Ref Range    Glucose 126 (H) 70 - 99 mg/dL   Glucose by meter   Result Value Ref Range    Glucose 143 (H) 70 - 99 mg/dL   Glucose by meter   Result Value Ref Range    Glucose 126 (H) 70 - 99 mg/dL   Glucose by meter   Result Value Ref Range    Glucose 115 (H) 70 - 99 mg/dL   Basic metabolic panel   Result Value Ref Range    Sodium 141 133 - 144 mmol/L    Potassium 4.4 3.4 - 5.3 mmol/L    Chloride 109 94 - 109 mmol/L    Carbon Dioxide 23 20 - 32 mmol/L    Anion Gap 9 3 - 14 mmol/L    Glucose 122 (H) 70 - 99 mg/dL    Urea Nitrogen 22 7 - 30 mg/dL    Creatinine 1.55 (H) 0.52 - 1.04 mg/dL    GFR Estimate 32 (L) >60 mL/min/1.7m2    GFR Estimate If Black 38 (L) >60 mL/min/1.7m2    Calcium 8.2 (L) 8.5 - 10.1 mg/dL   CBC with platelets   Result Value Ref Range    WBC 8.5 4.0 - 11.0 10e9/L    RBC Count 4.11 3.8 - 5.2 10e12/L    Hemoglobin 11.2 (L) 11.7 - 15.7 g/dL    Hematocrit 36.0 35.0 - 47.0 %    MCV 88 78 - 100 fl    MCH 27.3 26.5 - 33.0 pg    MCHC 31.1 (L) 31.5 - 36.5 g/dL    RDW 14.5 10.0 - 15.0 %    Platelet Count 257 150 - 450 10e9/L   CK total   Result Value Ref Range    CK Total 52 30 - 225 U/L   Procalcitonin   Result Value Ref Range    Procalcitonin <0.05 ng/ml

## 2018-08-24 NOTE — PLAN OF CARE
Problem: Patient Care Overview  Goal: Plan of Care/Patient Progress Review  Outcome: Improving  AO x 4, anxious at times. VSS, no c/o pain. Infrequent cough. Abd contour irregular, denies flatus.  Straining with urine, no stones noted. Need urine sample. Reg diet, fair abel. IV SL. SBA. Continue to monitor.

## 2018-08-24 NOTE — PLAN OF CARE
Problem: Patient Care Overview  Goal: Plan of Care/Patient Progress Review  Outcome: No Change    A&Ox4, anxious at times. VSS. Denies nausea/pain. LS clear. Voiding adequately, using strainer for stones.  Abd contour irregular r/t hernia, bowel sounds active, passing gas. Tolerating regular diet Fair appetite.  PIV infusing NS @ 100ml/hr. BGM: 143, coverage given. Up with SBA. Plan pending urology consult. Will continue to monitor.

## 2018-08-24 NOTE — PLAN OF CARE
Problem: Patient Care Overview  Goal: Plan of Care/Patient Progress Review  Outcome: Improving  Discharged from unit today at 1420 via wheelchair escorted by [employer] volunteer.  Vitals stable and denies pain.  Passing gas, bowel movement this afternoon and voiding adequately.  RN provided teaching on diet and exercise regimen.  Will follow up with PCP, Urology, and Cardiology.  New medication nifedipine sent with patient to help pass stone.  No further questions or concerns.

## 2018-08-24 NOTE — DISCHARGE SUMMARY
Discharge Summary  Hospitalist    Date of Admission:  8/22/2018  Date of Discharge:  8/24/2018  Discharging Provider: Neftali Meyers MD    Primary Care Physician   Maryan Churchill  Primary Care Provider Phone Number: [phone]  Primary Care Provider Fax Number: [phone]    PRINCIPAL DIAGNOSIS  Abdominal pain likely from ventral hernia/constipation/renal stone - improved  Nephrolithiasis with mild obstruction  lactic acidosis likely from dehydration, metformin therapy.  Mild leukocytosis with concern for possible infection.  Ventral hernia   Mild acute on chronic kidney injury.    Past Medical History:   Diagnosis Date     Aortic valve sclerosis     heart murmur, no AS     Arrhythmia     PAT, PVC     Aspirin allergy     Plavix use long term     Asthma      CKD (chronic kidney disease) stage 3, GFR 30-59 ml/min     x 2007 atleast     Congestive heart failure, unspecified      Depression      Diabetes mellitus (H) 2010     Diastolic dysfunction, left ventricle 2013    grade 2, nl ef     HTN (hypertension)      Migraine headache      Myocardial infarction 9/2007, cath 2013 ml    BMS: stent to OM, diag, nl EF, echo /C angia 2013 , f/u cath no lesion >40%     OA (osteoarthritis) of knee      Obesity      Rheumatoid arthritis flare (H)     prednisone     Sleep apnea     on CPAP (not using 2016)     TIA (transient ischaemic attack)      Ventral hernia, unspecified, without mention of obstruction or gangrene        History of Present Illness   Moira Andre is an 84 year old female who presented with abdominal pain    Hospital Course   Moira Andre is a 84 year old female with a known ventral hernia, DM type 2, depression, ELIGIO who presented with acute abdominal pain very similar to prior episodes of abdominal pain with hernia.   Admitted from 7/4 - 7/6 with symptomatic ventral hernia.  She was seen by surgery and plan was for elective surgery as outpatient but she has not followed up with them yet.  She stated  her pain was very similar.  CT showed a 0.3 cm distal right ureteric calculus at the ureterovesicular junction resulting in mild obstruction moderate to large hernia is seen containing transverse colon no evidence of bowel obstruction.     Abdominal pain likely from ventral hernia/constipation/renal stone - improved  Nephrolithiasis with mild obstruction  As identified on CT A/P, 0.3cm distal right ureteral calculus at ureterovesical junction  Given the severity of her pain patient did not feel she could go home and therefore admitted under observation, to ensure adequate pain control.   Started nifedipine 30 mg extended release daily to promote stone passage (patient is allergic to yellow dye, which can be in Flomax)  Strain urine-no evidence of stone  managed with pain meds, de-escalated to home PRN narcotics.  Urology consulted, managed conservatively symptoms improved and plan for discharge with urology follow-up PRN.    lactic acidosis likely from dehydration, metformin therapy.  Mild leukocytosis with concern for possible infection.  Lactic acid 3.5 on admission, improved to 2.2 with IVF.   UA with nitrite negative, leukocytosis to raise negative, one WBC.  No acute findings on CT imaging.  Patient was treated empirically with IVs Rocephin [8/23 - 8/24]  this morning WBC count normal at 8.5, lactic acid pending. Pro calcitonin less than 0.05. Patient does not have any urinary symptoms. Hence antibiotics discontinued at the time of discharge.      Ventral hernia   With recurring abdominal pain, with plan for elective repair as outpatient after holding plavix x7d.   No evidence of obstruction on CT. Pain very similar to prior ventral hernia pain. Improved with pain medication.   Patient was instructed to follow-up as an outpatient to schedule in future however has yet to make appointment, hence restarted Plavix at the time of discharge.      Mild acute on chronic kidney injury.  CKD stage III.  Creatinine  1.5--1.69 (baseline around 1.2-1.4)  Held lisinopril, Lasix during hospitalization and restarted Lasix at the time of discharge.  Avoid nephrotoxic drugs, encourage adequate oral hydration.  Monitor renal function on PCP visit.      HTN   continued PTA metoprolol  Started nifedipine as above   Lasix restarted the time of discharge.  Lisinopril on hold given acute on chronic kidney injury., Monitor home blood pressure readings and review on PCP visit.      CAD, status post bare metal stent x 2 to the OM as all well diagonal in 2007. On plavix due to ASA allergy.   Diastolic HF   EF 50-55% on angiogram in 2017, fluid overload during last hospitalization  - Held Plavix during hospitalization anticipating any procedure. Restarted at the time of discharge.  - Continue PTA metoprolol, atorvastatin  -Lasix, lisinopril on hold given lactic acidosis and acute on chronic kidney injury during hospitalization. Lasix restarted at the time of discharge. Lisinopril on hold      Diabetes mellitus type 2   A1c 7.6 in July 2018 blood sugar 156 at admission  - Holding metformin given elevated lactate during hospitalization and restarted at the time of discharge.    GERD  - PTA ranitidine    Chronic pain.  Continue PTA PRN hydrocodone, tylenolol    Depression.    Continues on prior to admission Cymbalta.       ELIGIO  obesity with a BMI of greater than 30  - CPAP per home settings.     Asthma, intermittent.    Continue prior to admission inhalers. No flair on exam or by sxs    # Discharge Pain Plan:   - Patient currently has NO PAIN and is not being prescribed pain medications on discharge.   She is recommended to take a PTA PRN narcotics as needed with stool softener's.  Patient, interdisciplinary team involved in care and agree with discharge plan.    Neftali Meyers MD    Pending Results   These results will be followed up by ordering provider  Unresulted Labs Ordered in the Past 30 Days of this Admission     Date and Time Order Name  Status Description    8/23/2018 1335 Urine Culture Aerobic Bacterial Preliminary     8/23/2018 1321 Urine Culture Aerobic Bacterial Preliminary     8/22/2018 2120 Blood culture Preliminary     8/22/2018 2118 Blood culture Preliminary              Physical Exam   There were no vitals filed for this visit.  Vital Signs with Ranges  Temp:  [96.3  F (35.7  C)-98.6  F (37  C)] 98.6  F (37  C)  Pulse:  [71-72] 72  Heart Rate:  [79] 79  Resp:  [18] 18  BP: (126-151)/(52-77) 126/52  SpO2:  [91 %-92 %] 92 %  I/O last 3 completed shifts:  In: -   Out: 150 [Urine:150]  PHYSICAL EXAM  GENERAL: Patient is in no distress. Alert and oriented.  HEENT: Oropharynx pink, moist. Pupils equal  HEART: Regular rate and rhythm. S1S2. No murmurs  LUNGS: bilateral slightly decreased breath sounds, no wheezing.  ABDOMEN: Soft, no abdominal tenderness, bowel sounds heard   no paraspinal tenderness, no suprapubic tenderness.  EXTREMITIES: No pedal edema. 2+ peripheral pulses.  SKIN: Warm, dry. No rash or bruising.  PSYCHIATRY Cooperative  )Consultations This Hospital Stay   PHARMACY IP CONSULT  UROLOGY IP CONSULT    Time Spent on this Encounter   I, Neftali Meyers, personally saw the patient today and spent greater than 30 minutes discharging this patient.    Discharge Orders     Follow Up ([facility])   Follow up in urology office as needed.   [facility] ([facility])  [address] [place] [address], [address]  [place]. [address]  You may call [phone] with any questions or concerns.   Central Appointment #: [phone]     Reason for your hospital stay   You were admitted with abdominal pain, has improved with conservative measures.     Follow-up and recommended labs and tests    Follow up with primary care provider, Maryan Churchill, within 7 days for hospital follow- up.  The following labs/tests are recommended: bmp.     Activity   Your activity upon discharge: activity as tolerated  no driving  while taking narcotics.     Monitor and record   blood pressure daily  pulse daily and review on provider visit.     When to contact your care team   Call your primary doctor if you have any of the following: increased pain.     Discharge Instructions   Equate oral hydration.  Aggressive incentive spirometry.  Consider lifestyle modification with diet and exercise.  PRN over-the-counter stool toughness as needed for constipation.  Follow-up with urology as needed as outpatient.     Full Code     Diet   Low-salt, low-fat, low carb diet.         Discharge Medications   Current Discharge Medication List      START taking these medications    Details   NIFEdipine ER osmotic (ADALAT CC) 30 MG TB24 Take 1 tablet (30 mg) by mouth daily  Qty: 14 tablet, Refills: 0    Associated Diagnoses: Ureteral stone         CONTINUE these medications which have CHANGED    Details   lisinopril (PRINIVIL/ZESTRIL) 20 MG tablet Take 1 tablet (20 mg) by mouth daily Hold until review creatinine on provider visit  Qty: 90 tablet, Refills: 0    Associated Diagnoses: Essential hypertension         CONTINUE these medications which have NOT CHANGED    Details   acetaminophen (TYLENOL) 325 MG tablet Take 325-650 mg by mouth every 6 hours as needed for mild pain      ADVAIR DISKUS 100-50 MCG/DOSE diskus inhaler INHALE 1 PUFF EVERY 12 HOURS  Qty: 3 Inhaler, Refills: 3    Associated Diagnoses: Intermittent asthma, uncomplicated      albuterol (PROAIR HFA/PROVENTIL HFA/VENTOLIN HFA) 108 (90 BASE) MCG/ACT Inhaler Inhale 2 puffs into the lungs every 6 hours as needed for shortness of breath / dyspnea  Qty: 1 Inhaler, Refills: 3    Associated Diagnoses: Intermittent asthma, uncomplicated      atorvastatin (LIPITOR) 20 MG tablet Take 1 tablet (20 mg) by mouth daily  Qty: 90 tablet, Refills: 3    Associated Diagnoses: Hyperlipidemia LDL goal <70      Cholecalciferol (VITAMIN D) 2000 UNITS CAPS Take by mouth daily      clopidogrel (PLAVIX) 75 MG tablet Take  1 tablet (75 mg) by mouth daily  Qty: 90 tablet, Refills: 3    Comments: Profile Rx: patient will contact pharmacy when needed  Associated Diagnoses: Coronary artery disease involving native coronary artery of native heart without angina pectoris      DULoxetine (CYMBALTA) 60 MG EC capsule Take 1 capsule (60 mg) by mouth daily  Qty: 90 capsule, Refills: 1    Comments: Profile Rx: patient will contact pharmacy when needed  Associated Diagnoses: Moderate major depression (H)      fluticasone (FLONASE) 50 MCG/ACT nasal spray Spray 1 spray into both nostrils as needed       furosemide (LASIX) 20 MG tablet Take 1 tablet (20 mg) by mouth daily as needed  Qty: 15 tablet, Refills: 1    Comments: Profile Rx: patient will contact pharmacy when needed  Associated Diagnoses: Edema of lower extremity      HYDROcodone-acetaminophen (NORCO) 5-325 MG per tablet Take 1 tablet by mouth every 8 hours as needed for moderate to severe pain  Qty: 90 tablet, Refills: 0    Associated Diagnoses: Multiple joint pain      Menthol, Topical Analgesic, (ICY HOT EX) Apply to affected area every morning      metFORMIN (GLUCOPHAGE-XR) 500 MG 24 hr tablet Take 2 tablets (1,000 mg) by mouth daily (with dinner)  Qty: 180 tablet, Refills: 1    Associated Diagnoses: Type 2 diabetes mellitus with diabetic nephropathy, without long-term current use of insulin (H)      metoprolol succinate (TOPROL-XL) 100 MG 24 hr tablet TAKE 1 TABLET EVERY DAY  (DUE  FOR  OFFICE  VISIT, PLEASE MAKE APPT.)  Qty: 90 tablet, Refills: 3    Associated Diagnoses: Essential hypertension      neomycin-polymyxin-hydrocortisone (CORTISPORIN) otic solution Place 4 drops into both ears 4 times daily  Qty: 10 mL, Refills: 0    Associated Diagnoses: Left ear pain      Nitroglycerin (NITROQUICK SL) Place 0.4 mg under the tongue every 5 minutes as needed       nystatin-triamcinolone (MYCOLOG II) cream APPLY TOPICALLY TWICE DAILY  Qty: 60 g, Refills: 1    Associated Diagnoses: Tinea of  the body      ranitidine (ZANTAC) 150 MG tablet Take 1 tablet (150 mg) by mouth 2 times daily  Qty: 60 tablet, Refills: 1    Associated Diagnoses: Dry cough      blood glucose monitoring (NO BRAND SPECIFIED) meter device kit Use to test blood sugar 1-2 times daily or as directed.  Qty: 1 kit, Refills: 0    Comments: [employer] True Metrix Air Meter  Associated Diagnoses: Type 2 diabetes mellitus with diabetic nephropathy (H)      blood glucose monitoring (TRUE METRIX BLOOD GLUCOSE TEST) test strip 1 strip by In Vitro route daily  Qty: 200 strip, Refills: 3    Associated Diagnoses: Type 2 diabetes mellitus with diabetic nephropathy, without long-term current use of insulin (H)      Blood Glucose Monitoring Suppl (TRUE METRIX AIR GLUCOSE METER) W/DEVICE KIT USE AS DIRECTED  Qty: 1 kit, Refills: 0    Associated Diagnoses: Type 2 diabetes mellitus with diabetic nephropathy (H)      IBANdronate (BONIVA) 150 MG tablet Take 150 mg by mouth every 30 days Patient takes on the first day of each month.      order for DME DREAMSTATION  5-15 CM/H20  NASAL WISP FABRIC      TRUEPLUS LANCETS 28G MISC 1 each 2 times daily as needed  Qty: 100 each, Refills: 3    Comments: Trueplus Super Think 28g lancets  Associated Diagnoses: Type 2 diabetes mellitus with diabetic nephropathy (H)           Allergies   Allergies   Allergen Reactions     Aspirin Hives     Reaction occurred during childhood.      Minocycline      Yellow Dye Allergy. Minocycline has Yellow Dye #10.     Yellow Dye Hives     Rxn to yellow tablet. Eyes swelled shut.        Discharge Disposition   Discharged to home  Condition at discharge: Good    DATA  Most Recent 3 CBC's:  Recent Labs   Lab Test  08/24/18   0726  08/23/18   0845  08/22/18 2015   WBC  8.5  12.5*  11.7*   HGB  11.2*  12.2  12.7   MCV  88  87  87   PLT  257  251  268      Most Recent 3 BMP's:  Recent Labs   Lab Test  08/24/18   0726  08/23/18   0845  08/22/18 2015   NA  141  136  137   POTASSIUM  4.4  5.3   4.8   CHLORIDE  109  106  106   CO2  23  20  20   BUN  22  24  25   CR  1.55*  1.69*  1.52*   ANIONGAP  9  10  11   TELLY  8.2*  8.3*  9.2   GLC  122*  145*  156*     Most Recent 2 LFT's:  Recent Labs   Lab Test  08/22/18 2015 07/04/18   0658   AST  13  22   ALT  17  20   ALKPHOS  62  65   BILITOTAL  0.3  0.5     Most Recent INR's and Anticoagulation Dosing History:  Anticoagulation Dose History     Recent Dosing and Labs Latest Ref Rng & Units 7/21/2007 9/25/2007 10/8/2007 10/12/2007 8/13/2013 9/21/2016 7/19/2017    INR 0.86 - 1.14 0.94 1.01 1.09 1.06 1.03 0.99 0.93        Most Recent 3 Troponin's:  Recent Labs   Lab Test  08/13/13   0850  08/13/13   0230  08/12/13   2055   TROPI  <0.012  <0.012  0.015     Most Recent Cholesterol Panel:  Recent Labs   Lab Test  08/06/18   0936   CHOL  149   LDL  46   HDL  45*   TRIG  290*     Most Recent 6 Bacteria Isolates From Any Culture (See EPIC Reports for Culture Details):  Recent Labs   Lab Test  08/24/18   0645  08/22/18   2227  08/22/18   2210  08/22/18 2015 07/04/18   0918   CULT  PENDING  Culture in progress  No growth after 1 day  No growth after 2 days  >100,000 colonies/mL  mixed urogenital tyelr       Most Recent TSH, T4 and A1c Labs:  Recent Labs   Lab Test  07/04/18   0658  04/03/18   1559   TSH   --   2.11   A1C  7.6*  7.2*     Results for orders placed or performed during the hospital encounter of 08/22/18   CT Abdomen Pelvis w Contrast    Narrative    CT ABDOMEN AND PELVIS WITH CONTRAST   8/22/2018 9:55 PM     HISTORY: Diffuse abdominal pain and vomiting. Known nonreducible  ventral hernia.    COMPARISON: 7/4/2018.    TECHNIQUE: Following the uneventful administration of 100ml Isovue-370  intravenous contrast, helical sections were acquired from the top of  the diaphragm through the pubic symphysis. Coronal reconstructions  were generated. Radiation dose for this scan was reduced using  automated exposure control, adjustment of the mA and/or kV  according  to the patient's size, or iterative reconstruction technique.    FINDINGS:     Abdomen: The liver, spleen, pancreas and adrenal glands are  unremarkable. A few probable cysts scattered within both kidneys. Tiny  0.3 cm calculus in the most distal aspect of the right ureter at the  ureterovesicular junction (series 3 image 69). Mild dilatation of the  right intrarenal collecting system and ureter. Slight relative delay  in enhancement of the right kidney. Slight right perinephric haziness.  Prior cholecystectomy. No enlarged lymph nodes or free fluid in the  upper abdomen. Atherosclerotic calcification in the abdominal aorta.    Scan through the lower chest is significant for mild cardiomegaly.    Pelvis: The small and large bowel are normal in caliber. A few colonic  diverticula are present without evidence of diverticulitis. The  appendix is not visualized. No bowel wall thickening, pneumatosis or  free intraperitoneal gas. The uterus is not visualized. No enlarged  lymph nodes or free fluid in the pelvis. Moderate to large midline  ventral hernia containing a several centimeter long segment of the mid  transverse colon. No evidence of bowel obstruction due to the hernia.      Impression    IMPRESSION:   1. 0.3 cm distal right ureteral calculus at the ureterovesicular  junction, resulting in mild obstruction.  2. Colonic diverticulosis without evidence of diverticulitis.  3. Moderate to large ventral hernia containing transverse colon. No  evidence of bowel obstruction due to the hernia.    TAMIKO COLLAZO MD

## 2018-08-24 NOTE — PLAN OF CARE
Problem: Patient Care Overview  Goal: Plan of Care/Patient Progress Review  Outcome: No Change  A&Ox4. SBA to BR. AUO. Straining urine with no results so far. Denies pain. VSS on RA. IVF infusing. BGM. Urology consulted.

## 2018-08-25 LAB
BACTERIA SPEC CULT: NORMAL
Lab: NORMAL
SPECIMEN SOURCE: NORMAL

## 2018-08-27 ENCOUNTER — TELEPHONE (OUTPATIENT)
Dept: FAMILY MEDICINE | Facility: CLINIC | Age: 83
End: 2018-08-27

## 2018-08-27 NOTE — TELEPHONE ENCOUNTER
"ED/Discharge Protocol    \"Hi, my name is Maria R Koep, a registered nurse, and I am calling on behalf of Dr. Churchill's office at [place].  I am calling to follow up and see how things are going for you after your recent visit.\"    \"I see that you were in the (ER/UC/IP) on 8/22/2018-8/24/2018.    How are you doing now that you are home?\" patient states she's doing ok - new med working well - not having any pain - states she isn't sure if stone was passed or not    Is patient experiencing symptoms that may require a hospital visit?  No    Discharge Instructions    \"Let's review your discharge instructions.  What is/are the follow-up recommendations?  Pt. Response: F/U with PCP    \"Were you instructed to make a follow-up appointment?\"  Pt. Response: Yes.  Has appointment been made?   Yes      \"When you see the provider, I would recommend that you bring your discharge instructions with you.    Medications    \"How many new medications are you on since your hospitalization/ED visit?\"    0-1  \"How many of your current medicines changed (dose, timing, name, etc.) while you were in the hospital/ED visit?\"   0-1  \"Do you have questions about your medications?\"   No  \"Were you newly diagnosed with heart failure, COPD, diabetes or did you have a heart attack?\"   No  For patients on insulin: \"Did you start on insulin in the hospital or did you have your insulin dose changed?\"   No    Medication reconciliation completed? Yes    Was MTM referral placed (*Make sure to put transitions as reason for referral)?   No    Call Summary    \"Do you have any questions or concerns about your condition or care plan at the moment?\"    No  Triage nurse advice given: Keep upcoming appt with PCP    Patient was in ER twice in the past year (assess appropriateness of ER visits.)      \"If you have questions or things don't continue to improve, we encourage you contact us through the main clinic number,  [phone].  Even if the clinic is not open, " "triage nurses are available 24/7 to help you.     We would like you to know that our clinic has extended hours (provide information).  We also have urgent care (provide details on closest location and hours/contact info)\"      \"Thank you for your time and take care!\"    Maria R MONCADA RN    Discharge Orders         Follow Up ([facility])   Follow up in urology office as needed.   [facility] ([facility])  [address] [place] [address], [address]  [place]. [address]  You may call [phone] with any questions or concerns.   Central Appointment #: [phone]      Reason for your hospital stay   You were admitted with abdominal pain, has improved with conservative measures.      Follow-up and recommended labs and tests    Follow up with primary care provider, Maryan Churchill, within 7 days for hospital follow- up.  The following labs/tests are recommended: bmp.      Activity   Your activity upon discharge: activity as tolerated  no driving while taking narcotics.      Monitor and record   blood pressure daily  pulse daily and review on provider visit.      When to contact your care team   Call your primary doctor if you have any of the following: increased pain.      Discharge Instructions   Equate oral hydration.  Aggressive incentive spirometry.  Consider lifestyle modification with diet and exercise.  PRN over-the-counter stool toughness as needed for constipation.  Follow-up with urology as needed as outpatient.      Full Code      Diet   Low-salt, low-fat, low carb diet.     Discharge Medications           Current Discharge Medication List            START taking these medications     Details   NIFEdipine ER osmotic (ADALAT CC) 30 MG TB24 Take 1 tablet (30 mg) by mouth daily  Qty: 14 tablet, Refills: 0     Associated Diagnoses: Ureteral stone           "

## 2018-08-28 LAB
BACTERIA SPEC CULT: NO GROWTH
Lab: NORMAL
SPECIMEN SOURCE: NORMAL

## 2018-08-29 LAB
BACTERIA SPEC CULT: NO GROWTH
Lab: NORMAL
SPECIMEN SOURCE: NORMAL

## 2018-08-31 ENCOUNTER — TELEPHONE (OUTPATIENT)
Dept: FAMILY MEDICINE | Facility: CLINIC | Age: 83
End: 2018-08-31

## 2018-08-31 ENCOUNTER — OFFICE VISIT (OUTPATIENT)
Dept: FAMILY MEDICINE | Facility: CLINIC | Age: 83
End: 2018-08-31
Payer: COMMERCIAL

## 2018-08-31 VITALS
SYSTOLIC BLOOD PRESSURE: 140 MMHG | TEMPERATURE: 97.7 F | BODY MASS INDEX: 44.73 KG/M2 | HEIGHT: 67 IN | WEIGHT: 285 LBS | DIASTOLIC BLOOD PRESSURE: 80 MMHG | HEART RATE: 84 BPM | OXYGEN SATURATION: 97 %

## 2018-08-31 DIAGNOSIS — I10 ESSENTIAL HYPERTENSION: ICD-10-CM

## 2018-08-31 DIAGNOSIS — K43.9 VENTRAL HERNIA WITHOUT OBSTRUCTION OR GANGRENE: ICD-10-CM

## 2018-08-31 DIAGNOSIS — N18.30 CKD (CHRONIC KIDNEY DISEASE) STAGE 3, GFR 30-59 ML/MIN (H): ICD-10-CM

## 2018-08-31 DIAGNOSIS — F33.1 MODERATE EPISODE OF RECURRENT MAJOR DEPRESSIVE DISORDER (H): Primary | ICD-10-CM

## 2018-08-31 DIAGNOSIS — R79.89 ELEVATED LACTIC ACID LEVEL: ICD-10-CM

## 2018-08-31 DIAGNOSIS — E66.01 MORBID OBESITY (H): ICD-10-CM

## 2018-08-31 DIAGNOSIS — E11.21 TYPE 2 DIABETES MELLITUS WITH DIABETIC NEPHROPATHY, WITHOUT LONG-TERM CURRENT USE OF INSULIN (H): ICD-10-CM

## 2018-08-31 DIAGNOSIS — N20.0 KIDNEY STONE: ICD-10-CM

## 2018-08-31 LAB
ANION GAP SERPL CALCULATED.3IONS-SCNC: 11 MMOL/L (ref 3–14)
BUN SERPL-MCNC: 25 MG/DL (ref 7–30)
CALCIUM SERPL-MCNC: 9.5 MG/DL (ref 8.5–10.1)
CHLORIDE SERPL-SCNC: 104 MMOL/L (ref 94–109)
CO2 SERPL-SCNC: 23 MMOL/L (ref 20–32)
CREAT SERPL-MCNC: 1.43 MG/DL (ref 0.52–1.04)
GFR SERPL CREATININE-BSD FRML MDRD: 35 ML/MIN/1.7M2
GLUCOSE SERPL-MCNC: 122 MG/DL (ref 70–99)
LACTATE SERPL-SCNC: 3.3 MMOL/L (ref 0.4–2)
POTASSIUM SERPL-SCNC: 4.9 MMOL/L (ref 3.4–5.3)
SODIUM SERPL-SCNC: 138 MMOL/L (ref 133–144)

## 2018-08-31 PROCEDURE — 99495 TRANSJ CARE MGMT MOD F2F 14D: CPT | Performed by: INTERNAL MEDICINE

## 2018-08-31 PROCEDURE — 80048 BASIC METABOLIC PNL TOTAL CA: CPT | Performed by: INTERNAL MEDICINE

## 2018-08-31 PROCEDURE — 83605 ASSAY OF LACTIC ACID: CPT | Performed by: INTERNAL MEDICINE

## 2018-08-31 PROCEDURE — 36415 COLL VENOUS BLD VENIPUNCTURE: CPT | Performed by: INTERNAL MEDICINE

## 2018-08-31 RX ORDER — BUPROPION HYDROCHLORIDE 100 MG/1
100 TABLET, EXTENDED RELEASE ORAL 2 TIMES DAILY
Qty: 60 TABLET | Refills: 3 | Status: SHIPPED | OUTPATIENT
Start: 2018-08-31 | End: 2018-11-08

## 2018-08-31 RX ORDER — LISINOPRIL 20 MG/1
10 TABLET ORAL DAILY
Qty: 90 TABLET | Refills: 0
Start: 2018-08-31 | End: 2018-10-04

## 2018-08-31 ASSESSMENT — ANXIETY QUESTIONNAIRES
1. FEELING NERVOUS, ANXIOUS, OR ON EDGE: NOT AT ALL
5. BEING SO RESTLESS THAT IT IS HARD TO SIT STILL: NOT AT ALL
IF YOU CHECKED OFF ANY PROBLEMS ON THIS QUESTIONNAIRE, HOW DIFFICULT HAVE THESE PROBLEMS MADE IT FOR YOU TO DO YOUR WORK, TAKE CARE OF THINGS AT HOME, OR GET ALONG WITH OTHER PEOPLE: NOT DIFFICULT AT ALL
3. WORRYING TOO MUCH ABOUT DIFFERENT THINGS: NOT AT ALL
6. BECOMING EASILY ANNOYED OR IRRITABLE: NOT AT ALL
GAD7 TOTAL SCORE: 0
7. FEELING AFRAID AS IF SOMETHING AWFUL MIGHT HAPPEN: NOT AT ALL
2. NOT BEING ABLE TO STOP OR CONTROL WORRYING: NOT AT ALL

## 2018-08-31 ASSESSMENT — PATIENT HEALTH QUESTIONNAIRE - PHQ9: 5. POOR APPETITE OR OVEREATING: NOT AT ALL

## 2018-08-31 NOTE — LETTER
[facility]  [address] [facility]  [address]  [place]  [address]  Tel: [phone]    September 4, 2018    Moira ALEGRIA Jes  [address]  [address]  [facility] [address]        Dear Ms. Andre,    This is to inform you regarding your test result.    I spoke to you on phone but sending you a note also.  Your chronic kidney disease is stable  Your lactic acid level is elevated   Please do not take metformin.  I am glad that you have stopped when you got the message from my nurse.  I would like to check your level again in one month.    Sincerely,    Maryan Churchill MD/JOSÉ MIGUEL          Enclosure: Lab Results  Results for orders placed or performed in visit on 08/31/18   Basic metabolic panel   Result Value Ref Range    Sodium 138 133 - 144 mmol/L    Potassium 4.9 3.4 - 5.3 mmol/L    Chloride 104 94 - 109 mmol/L    Carbon Dioxide 23 20 - 32 mmol/L    Anion Gap 11 3 - 14 mmol/L    Glucose 122 (H) 70 - 99 mg/dL    Urea Nitrogen 25 7 - 30 mg/dL    Creatinine 1.43 (H) 0.52 - 1.04 mg/dL    GFR Estimate 35 (L) >60 mL/min/1.7m2    GFR Estimate If Black 42 (L) >60 mL/min/1.7m2    Calcium 9.5 8.5 - 10.1 mg/dL   Lactic acid   Result Value Ref Range    Lactic Acid 3.3 (H) 0.4 - 2.0 mmol/L

## 2018-08-31 NOTE — MR AVS SNAPSHOT
After Visit Summary   8/31/2018    Moira Andre    MRN: [id number]           Patient Information     Date Of Birth          9/24/1933        Visit Information        Provider Department      8/31/2018 10:00 AM Maryan Churchill MD [facility]        Today's Diagnoses     Moderate episode of recurrent major depressive disorder (H)    -  1    Ventral hernia without obstruction or gangrene        CKD (chronic kidney disease) stage 3, GFR 30-59 ml/min        Type 2 diabetes mellitus with diabetic nephropathy, without long-term current use of insulin (H)        Morbid obesity (H)        Elevated lactic acid level        Essential hypertension          Care Instructions    Hold metformin as lactic acid is elevated  Resume lisinopril 20 mg( half tablet ) daily  Recheck labs today  Avoid dehydration  Make appointment to see counselor and psychiatrist.  I am adding bupropion( Wellbutrin) for depression 100 mg twice daily  Make appointment with urologist for kidney stone  make appointment with general surgeon about ventral hernia  Follow up in 4 weeks  seek sooner medical attention if there is any worsening of symptoms or problems.            Follow-ups after your visit        Additional Services     GENERAL SURG ADULT REFERRAL       Your provider has referred you to: [facility]: [facility] [place] [phone]   [email or web address]    Please be aware that coverage of these services is subject to the terms and limitations of your health insurance plan.  Call member services at your health plan with any benefit or coverage questions.      Please bring the following with you to your appointment:    (1) Any X-Rays, CTs or MRIs which have been performed.  Contact the facility where they were done to arrange for  prior to your scheduled appointment.   (2) List of current medications   (3) This referral request   (4) Any documents/labs given to you for  this referral            MENTAL HEALTH REFERRAL  - Adult; Outpatient Treatment; Individual/Couples/Family/Group Therapy/Health Psychology; [facility]: [facility] [phone]; We will contact you to schedule the appointment or please call with any questions       All scheduling is subject to the client's specific insurance plan & benefits, provider/location availability, and provider clinical specialities.  Please arrive 15 minutes early for your first appointment and bring your completed paperwork.    Please be aware that coverage of these services is subject to the terms and limitations of your health insurance plan.  Call member services at your health plan with any benefit or coverage questions.                      MENTAL HEALTH REFERRAL  - Adult; Psychiatry and Medication Management; Psychiatry; Other: [facility] [phone]; We will contact you to schedule the appointment or please call with any questions       All scheduling is subject to the client's specific insurance plan & benefits, provider/location availability, and provider clinical specialities.  Please arrive 15 minutes early for your first appointment and bring your completed paperwork.    Please be aware that coverage of these services is subject to the terms and limitations of your health insurance plan.  Call member services at your health plan with any benefit or coverage questions.                            Follow-up notes from your care team     Return in about 4 weeks (around 9/28/2018) for medication follow up.      Your next 10 appointments already scheduled     Sep 17, 2018 10:00 AM CDT   Ech Complete with [id number]   [facility] CV Echocardiography (Cardiovascular Imaging at [facility])    [address] [place] [phone] [phone]           1.  Please bring or wear a comfortable two-piece outfit. 2.  You may eat, drink and take your normal medicines. 3.  " For any questions that cannot be answered, please contact the ordering physician 4.  Please do not wear perfumes or scented lotions on the day of your exam.            Sep 17, 2018  1:15 PM CDT   Return Visit with Jozef Sinha MD   [facility] ([facility])    [address] [id number]  [facility] [id number] [phone] OPT 2            Dec 03, 2018  9:00 AM CST   [facility]    [address] [facility] [phone]           Bring a current list of meds and any records pertaining to this visit. For Physicals, please bring immunization records and any forms needing to be filled out. Please arrive 10 minutes early to complete paperwork.              Who to contact     If you have questions or need follow up information about today's clinic visit or your schedule please contact [facility] directly at [phone].  Normal or non-critical lab and imaging results will be communicated to you by MyChart, letter or phone within 4 business days after the clinic has received the results. If you do not hear from us within 7 days, please contact the clinic through MyChart or phone. If you have a critical or abnormal lab result, we will notify you by phone as soon as possible.  Submit refill requests through [employer] or call your pharmacy and they will forward the refill request to us. Please allow 3 business days for your refill to be completed.          Additional Information About Your Visit        Care EveryWhere ID     This is your Care EveryWhere ID. This could be used by other organizations to access your [facility] medical records  [id number]        Your Vitals Were     Pulse Temperature Height Pulse Oximetry Breastfeeding? BMI (Body Mass Index)    84 97.7  F (36.5  C) (Oral) 5' 7\" (1.702 m) 97% No 44.64 kg/m2       Blood Pressure from Last 3 Encounters: "   08/31/18 140/80   08/24/18 126/52   08/06/18 126/72    Weight from Last 3 Encounters:   08/31/18 285 lb (129.3 kg)   08/06/18 284 lb 8 oz (129 kg)   07/09/18 296 lb (134.3 kg)              We Performed the Following     Basic metabolic panel     GENERAL SURG ADULT REFERRAL     Lactic acid     MENTAL HEALTH REFERRAL  - Adult; Outpatient Treatment; Individual/Couples/Family/Group Therapy/Health Psychology; [facility]: [facility] [phone]; We will contact you to schedule the appointment or please call with any questions     MENTAL HEALTH REFERRAL  - Adult; Psychiatry and Medication Management; Psychiatry; Other: [facility] [phone]; We will contact you to schedule the appointment or please call with any questions          Today's Medication Changes          These changes are accurate as of 8/31/18 10:35 AM.  If you have any questions, ask your nurse or doctor.               Start taking these medicines.        Dose/Directions    buPROPion 100 MG 12 hr tablet   Commonly known as:  WELLBUTRIN SR   Used for:  Moderate episode of recurrent major depressive disorder (H)   Started by:  Maryan Churchill MD        Dose:  100 mg   Take 1 tablet (100 mg) by mouth 2 times daily   Quantity:  60 tablet   Refills:  3         These medicines have changed or have updated prescriptions.        Dose/Directions    lisinopril 20 MG tablet   Commonly known as:  PRINIVIL/ZESTRIL   This may have changed:  how much to take   Used for:  Essential hypertension   Changed by:  Maryan Churchill MD        Dose:  10 mg   Take 0.5 tablets (10 mg) by mouth daily Hold until review creatinine on provider visit   Quantity:  90 tablet   Refills:  0       metFORMIN 500 MG 24 hr tablet   Commonly known as:  GLUCOPHAGE-XR   This may have changed:  when to take this   Used for:  Type 2 diabetes mellitus with diabetic nephropathy, without long-term current use of insulin (H)        Dose:  1000 mg   Take 2 tablets  (1,000 mg) by mouth daily (with dinner)   Quantity:  180 tablet   Refills:  1       neomycin-polymyxin-hydrocortisone otic solution   Commonly known as:  CORTISPORIN   This may have changed:    - when to take this  - reasons to take this   Used for:  Left ear pain        Dose:  4 drop   Place 4 drops into both ears 4 times daily   Quantity:  10 mL   Refills:  0       nystatin-triamcinolone cream   Commonly known as:  MYCOLOG II   This may have changed:  See the new instructions.   Used for:  Tinea of the body        APPLY TOPICALLY TWICE DAILY   Quantity:  60 g   Refills:  1            Where to get your medicines      These medications were sent to [id number] - [place], [place] - [address]  [address], [facility] [address]     Phone:  [phone]     buPROPion 100 MG 12 hr tablet         Some of these will need a paper prescription and others can be bought over the counter.  Ask your nurse if you have questions.     You don't need a prescription for these medications     lisinopril 20 MG tablet                Primary Care Provider Office Phone # Fax #    Maryan Churchill [phone] [address] [facility] [address] [facility] [address]  [facility] [address]        Equal Access to Services     DEL AGUILAR AH: Hadii gissel sono Que, waaxda luqadaha, qaybta kaalmada adeegyada, andreina bell. So [facility] [phone].    ATENCIÓN: Si habla español, tiene a gold disposición servicios gratuitos de asistencia lingüística. Llame al [phone].    We comply with applicable federal civil rights laws and [place] laws. We do not discriminate on the basis of race, color, national origin, age, disability, sex, sexual orientation, or gender identity.            Thank you!     Thank you for choosing [facility]  for your care. Our goal is always to provide you with excellent care. Hearing back from our patients is one way we can continue to improve our  services. Please take a few minutes to complete the written survey that you may receive in the mail after your visit with us. Thank you!             Your Updated Medication List - Protect others around you: Learn how to safely use, store and throw away your medicines at www.disposemymeds.org.          This list is accurate as of 8/31/18 10:35 AM.  Always use your most recent med list.                   Brand Name Dispense Instructions for use Diagnosis    acetaminophen 325 MG tablet    TYLENOL     Take 325-650 mg by mouth every 6 hours as needed for mild pain        ADVAIR DISKUS 100-50 MCG/DOSE diskus inhaler   Generic drug:  fluticasone-salmeterol     3 Inhaler    INHALE 1 PUFF EVERY 12 HOURS    Intermittent asthma, uncomplicated       albuterol 108 (90 Base) MCG/ACT inhaler    PROAIR HFA/PROVENTIL HFA/VENTOLIN HFA    1 Inhaler    Inhale 2 puffs into the lungs every 6 hours as needed for shortness of breath / dyspnea    Intermittent asthma, uncomplicated       atorvastatin 20 MG tablet    LIPITOR    90 tablet    Take 1 tablet (20 mg) by mouth daily    Hyperlipidemia LDL goal <70       * blood glucose monitoring meter device kit    no brand specified    1 kit    Use to test blood sugar 1-2 times daily or as directed.    Type 2 diabetes mellitus with diabetic nephropathy (H)       * TRUE METRIX AIR GLUCOSE METER w/Device Kit     1 kit    USE AS DIRECTED    Type 2 diabetes mellitus with diabetic nephropathy (H)       blood glucose monitoring test strip    TRUE METRIX BLOOD GLUCOSE TEST    200 strip    1 strip by In Vitro route daily    Type 2 diabetes mellitus with diabetic nephropathy, without long-term current use of insulin (H)       buPROPion 100 MG 12 hr tablet    WELLBUTRIN SR    60 tablet    Take 1 tablet (100 mg) by mouth 2 times daily    Moderate episode of recurrent major depressive disorder (H)       clopidogrel 75 MG tablet    PLAVIX    90 tablet    Take 1 tablet (75 mg) by mouth daily    Coronary artery  disease involving native coronary artery of native heart without angina pectoris       DULoxetine 60 MG EC capsule    CYMBALTA    90 capsule    Take 1 capsule (60 mg) by mouth daily    Moderate major depression (H)       FLONASE 50 MCG/ACT spray   Generic drug:  fluticasone      Spray 1 spray into both nostrils as needed        furosemide 20 MG tablet    LASIX    15 tablet    Take 1 tablet (20 mg) by mouth daily as needed    Edema of lower extremity       HYDROcodone-acetaminophen 5-325 MG per tablet    NORCO    90 tablet    Take 1 tablet by mouth every 8 hours as needed for moderate to severe pain    Multiple joint pain       IBANdronate 150 MG tablet    BONIVA     Take 150 mg by mouth every 30 days Patient takes on the first day of each month.        ICY HOT EX      Apply to affected area every morning        lisinopril 20 MG tablet    PRINIVIL/ZESTRIL    90 tablet    Take 0.5 tablets (10 mg) by mouth daily Hold until review creatinine on provider visit    Essential hypertension       metFORMIN 500 MG 24 hr tablet    GLUCOPHAGE-XR    180 tablet    Take 2 tablets (1,000 mg) by mouth daily (with dinner)    Type 2 diabetes mellitus with diabetic nephropathy, without long-term current use of insulin (H)       metoprolol succinate 100 MG 24 hr tablet    TOPROL-XL    90 tablet    TAKE 1 TABLET EVERY DAY  (DUE  FOR  OFFICE  VISIT, PLEASE MAKE APPT.)    Essential hypertension       neomycin-polymyxin-hydrocortisone otic solution    CORTISPORIN    10 mL    Place 4 drops into both ears 4 times daily    Left ear pain       NIFEdipine ER 30 MG Tb24    ADALAT CC    14 tablet    Take 1 tablet (30 mg) by mouth daily    Ureteral stone       NITROQUICK SL      Place 0.4 mg under the tongue every 5 minutes as needed        nystatin-triamcinolone cream    MYCOLOG II    60 g    APPLY TOPICALLY TWICE DAILY    Tinea of the body       order for DME      DREAMSTATION 5-15 CM/H20 NASAL WISP FABRIC        ranitidine 150 MG tablet    ZANTAC     60 tablet    Take 1 tablet (150 mg) by mouth 2 times daily    Dry cough       TRUEPLUS LANCETS 28G Misc     100 each    1 each 2 times daily as needed    Type 2 diabetes mellitus with diabetic nephropathy (H)       vitamin D 2000 units Caps      Take by mouth daily        * Notice:  This list has 2 medication(s) that are the same as other medications prescribed for you. Read the directions carefully, and ask your doctor or other care provider to review them with you.

## 2018-08-31 NOTE — PROGRESS NOTES
Please notify patient that her lactic acid is elevated .  Do not take any metformin.  Discontinue metformin in her medication list

## 2018-08-31 NOTE — PROGRESS NOTES
SUBJECTIVE:   Moira Andre is a 84 year old female who presents to clinic today for the following health issues:          Hospital Follow-up Visit:    Hospital/Nursing Home/IP Rehab Facility: [facility]  Date of Admission: 8/23/18  Date of Discharge: 8/24/18  Reason(s) for Admission: Nephrolithiasis            Problems taking medications regularly:  None       Medication changes since discharge: None       Problems adhering to non-medication therapy:  None    Summary of hospitalization:  [facility] discharge summary reviewed  Diagnostic Tests/Treatments reviewed.  Follow up needed: yes with general surgeon and urologist  Other Healthcare Providers Involved in Patient s Care:         Care Coordination, Specialist appointment - urologist and Surgical follow-up appointment - general surgery  Update since discharge: improved.     Post Discharge Medication Reconciliation: discharge medications reconciled and changed, per note/orders (see AVS).  Plan of care communicated with patient     Coding guidelines for this visit:  Type of Medical   Decision Making Face-to-Face Visit       within 7 Days of discharge Face-to-Face Visit        within 14 days of discharge   Moderate Complexity [id number]   High Complexity [id number]        Moira Andre is a 84 year old female with a known ventral hernia, DM type 2, depression, ELIGIO who presented with acute abdominal pain very similar to prior episodes of abdominal pain with hernia.   Admitted from 7/4 - 7/6 with symptomatic ventral hernia.  She was seen by surgery and plan was for elective surgery as outpatient but she has not followed up with them yet.  She stated her pain was very similar.  CT showed a 0.3 cm distal right ureteric calculus at the ureterovesicular junction resulting in mild obstruction moderate to large hernia is seen containing transverse colon no evidence of bowel obstruction.            Patient was crying a lot  Want to treat her  "depression  Current medication are not working well  She thinks that there is no reason to feel depressed .  She has nice appointment but does feel like doing anything  Has friends and wants to go out for dinner  Goes every now and then but cannot afford to go regularly  Feels lonely    Problem list and histories reviewed & adjusted, as indicated.  Additional history: as documented    Labs reviewed in EPIC    Reviewed and updated as needed this visit by clinical staff       Reviewed and updated as needed this visit by Provider         ROS:  Constitutional, neuro, ENT, endocrine, pulmonary, cardiac, gastrointestinal, genitourinary, musculoskeletal, integument and psychiatric systems are negative, except as otherwise noted.    OBJECTIVE:                                                    /80  Pulse 84  Temp 97.7  F (36.5  C) (Oral)  Ht 5' 7\" (1.702 m)  Wt 285 lb (129.3 kg)  SpO2 97%  Breastfeeding? No  BMI 44.64 kg/m2  Body mass index is 44.64 kg/(m^2).  GENERAL APPEARANCE: healthy, alert and no distress  At times she was having crying spells.  But then settled down     ASSESSMENT/PLAN:                                                      Moira was seen today for hospital f/u.    Diagnoses and all orders for this visit:    Moderate episode of recurrent major depressive disorder (H)  -     MENTAL HEALTH REFERRAL  - Adult; Outpatient Treatment; Individual/Couples/Family/Group Therapy/Health Psychology; [facility]: [facility] [phone]; We will contact you to schedule the appointment or please call with any questions  -     MENTAL HEALTH REFERRAL  - Adult; Psychiatry and Medication Management; Psychiatry; Other: [facility] [phone]; We will contact you to schedule the appointment or please call with any questions  -     buPROPion (WELLBUTRIN SR) 100 MG 12 hr tablet; Take 1 tablet (100 mg) by mouth 2 times daily  She is on duloxetin  Wellbutrin added  Referred for " counseling and psychiatrist.  Educated about the medication .    Ventral hernia without obstruction or gangrene  -     GENERAL SURG ADULT REFERRAL  She has not made appointment yet   Advised to make appointment with general surgery and phone number provided.    CKD (chronic kidney disease) stage 3, GFR 30-59 ml/min  -     Basic metabolic panel  Her lisinopril was discontinued .  She was on nifedipine so stone can be passed.    Type 2 diabetes mellitus with diabetic nephropathy, without long-term current use of insulin (H)  Discontinue metformin   Lactic acid is elevated  Will not resume that    Morbid obesity (H)  Discussed healthy eating and regular physical activity.    Elevated lactic acid level  -     Lactic acid  Discontinue metformin    Essential hypertension  -     lisinopril (PRINIVIL/ZESTRIL) 20 MG tablet; Take 0.5 tablets (10 mg) by mouth daily Hold until review creatinine on provider visit    Resume lisinopril as BP is high   Close monitoring os kidney function.    Kidney stone:   Seen on CT   Does not has pain  On nifedipine   Advised to follow up with urologist.      Follow up with Provider - 4 weeks   Patient Instructions   Hold metformin as lactic acid is elevated  Resume lisinopril 20 mg( half tablet ) daily  Recheck labs today  Avoid dehydration  Make appointment to see counselor and psychiatrist.  I am adding bupropion( Wellbutrin) for depression 100 mg twice daily  Make appointment with urologist for kidney stone  make appointment with general surgeon about ventral hernia  Follow up in 4 weeks  seek sooner medical attention if there is any worsening of symptoms or problems.        Maryan Churchill MD  [facility]

## 2018-08-31 NOTE — TELEPHONE ENCOUNTER
Result Note from PCP:     Please notify patient that her lactic acid is elevated .   Do not take any metformin.   Discontinue metformin in her medication list     Called patient to update: No answer. Did leave detailed VM (OK per Demographics and previous in-coming calls from patient) also with risk/benefit of pt receiving call in time to call back to clinic to get details before a Holiday Weekend.     Medication discontinued from List.

## 2018-08-31 NOTE — PATIENT INSTRUCTIONS
Hold metformin as lactic acid is elevated  Resume lisinopril 20 mg( half tablet ) daily  Recheck labs today  Avoid dehydration  Make appointment to see counselor and psychiatrist.  I am adding bupropion( Wellbutrin) for depression 100 mg twice daily  Make appointment with urologist for kidney stone  make appointment with general surgeon about ventral hernia  Follow up in 4 weeks  seek sooner medical attention if there is any worsening of symptoms or problems.

## 2018-09-01 PROBLEM — N20.0 KIDNEY STONE: Status: ACTIVE | Noted: 2018-09-01

## 2018-09-01 ASSESSMENT — PATIENT HEALTH QUESTIONNAIRE - PHQ9: SUM OF ALL RESPONSES TO PHQ QUESTIONS 1-9: 10

## 2018-09-01 ASSESSMENT — ANXIETY QUESTIONNAIRES: GAD7 TOTAL SCORE: 0

## 2018-09-04 DIAGNOSIS — R79.89 ELEVATED LACTIC ACID LEVEL: Primary | ICD-10-CM

## 2018-09-04 NOTE — PROGRESS NOTES
Please notify patient by sending following letter with copy of test results      Hello Moira,    This is to inform you regarding your test result.    I spoke to you on phone but sending you a note also.  Your chronic kidney disease is stable  Your lactic acid level is elevated   Please do not take metformin.  I am glad that you have stopped when you got the message from my nurse.  I would like to check your level again in one month.      Sincerely,      Dr.Nasima Kerline MD,FACP

## 2018-09-05 NOTE — TELEPHONE ENCOUNTER
Recalled and Spoke with patient:     She had in fact received the message and stopped Metformin the night before. (on 8/30)    To PCP: Does pt require follow-up lab testing?     Thank you,     Patti HOROWITZ RN

## 2018-09-05 NOTE — TELEPHONE ENCOUNTER
Yes , I spoke to her as well as she answered my phone call on weekend  She needs follow up labs in 4 weeks so needs to see her in 4 weeks for office visit and labs.  She got started on new antidepressants also.  Dr.Nasima Kerline MD

## 2018-09-17 ENCOUNTER — HOSPITAL ENCOUNTER (OUTPATIENT)
Dept: CARDIOLOGY | Facility: CLINIC | Age: 83
Discharge: HOME | End: 2018-09-17
Attending: NURSE PRACTITIONER | Admitting: NURSE PRACTITIONER
Payer: COMMERCIAL

## 2018-09-17 ENCOUNTER — OFFICE VISIT (OUTPATIENT)
Dept: CARDIOLOGY | Facility: CLINIC | Age: 83
End: 2018-09-17
Payer: COMMERCIAL

## 2018-09-17 VITALS
BODY MASS INDEX: 43.95 KG/M2 | DIASTOLIC BLOOD PRESSURE: 72 MMHG | HEART RATE: 66 BPM | HEIGHT: 67 IN | WEIGHT: 280 LBS | SYSTOLIC BLOOD PRESSURE: 124 MMHG

## 2018-09-17 DIAGNOSIS — I25.10 CORONARY ARTERY DISEASE INVOLVING NATIVE CORONARY ARTERY OF NATIVE HEART, ANGINA PRESENCE UNSPECIFIED: ICD-10-CM

## 2018-09-17 DIAGNOSIS — I25.10 CORONARY ARTERY DISEASE INVOLVING NATIVE CORONARY ARTERY OF NATIVE HEART WITHOUT ANGINA PECTORIS: Primary | ICD-10-CM

## 2018-09-17 PROCEDURE — 93306 TTE W/DOPPLER COMPLETE: CPT | Mod: 26 | Performed by: INTERNAL MEDICINE

## 2018-09-17 PROCEDURE — 25500064 ZZH RX 255 OP 636: Performed by: NURSE PRACTITIONER

## 2018-09-17 PROCEDURE — 99214 OFFICE O/P EST MOD 30 MIN: CPT | Mod: 25 | Performed by: INTERNAL MEDICINE

## 2018-09-17 PROCEDURE — 40000264 ECHO COMPLETE WITH OPTISON

## 2018-09-17 RX ADMIN — HUMAN ALBUMIN MICROSPHERES AND PERFLUTREN 7 ML: 10; .22 INJECTION, SOLUTION INTRAVENOUS at 10:49

## 2018-09-17 NOTE — LETTER
9/17/2018      Maryan Churchill MD  [address] [facility] [address]  [place] [address]      RE: Moira Andre       Dear Colleague,    I had the pleasure of seeing Moira Andre in the [facility].    Service Date: 09/17/2018      HISTORY OF PRESENT ILLNESS:  I had the pleasure of seeing our mutual patient, Moira Andre, today at the [facility] in [place].  IF YOU COULD PLEASE DO ME A FAVOR, PLEASE REVIEW THE PATIENT'S MEDICATIONS OR HAVE YOUR NURSE CALL THE PATIENT.  As you know, from a cardiac standpoint we have been seeing her, 11 years ago we stented her OM vessel and her diagonal, she had a normal ejection fraction.  In 2013 what we thought was angina prompted an angiogram, but it showed that the arteries were open.  There was 50% left circumflex narrowing, but the flow reserve tests were all normal.  All of the other vessels had 30% narrowing or less.  The patient does have a small left ventricle with some left ventricular hypertrophy.  She had some borderline LV outflow tract obstruction.  I am happy to report the current echo shows again ejection fraction greater than 70% but no reports of obstruction.  She does have mild mitral stenosis from mitral annular calcification.  She has no significant aortic valve stenosis even though she has an aortic outflow murmur.  Her right heart was slightly enlarged with normal ejection fraction that may be a reflection of the fact that she had sleep apnea and for a long time did not use her CPAP machine, but she is now using it.  She has trivial ankle edema.  As you know, she was in the hospital recently and you just saw her in the office about 2 weeks ago.  She came with abdominal pain which turned out to be a kidney stone.  She never made the followup visit with the urologist.  What I need your help with is her medicines.  The medicines were changed back and forth in the hospital because she had some lactic acidosis and a rising creatinine.   She was placed back on lisinopril in your office about 2 weeks ago.  The creatinine when you saw her was 1.43, whereas it was 1.2 a year ago.  Her carbon dioxide was normal.  The patient cannot name her medicines.  It looks like she is off the beta blocker.  I do not know why, but at this point, her blood pressure and pulse are fine and she does not have any ischemic lesions, so I am not too concerned that she is off the beta blocker, but I am not really sure what she is on and she is not sure what she is on, and I suspect she probably needs to have another electrolyte panel checked if the lisinopril was restarted and I will ask if you could kindly do that.  From a cardiac standpoint, then, I think she is stable to the point that I am going to wait and see her in 2 years.  I do not anticipate a stress test or another echo for at least a few years unless there is a change in her clinical history, but again if you could kindly work through with her or have your nurse check and see what medicines she is actually on and see if she is due for blood work based on those, given that she had metabolic acidosis and acute renal insufficiency when she was in the hospital last month.      Today's visit was 20 minutes, greater than 50% counseling.      cc:   Maryan Churchill MD    [address], [place] [address]         ALAYNA CORREA MD             D: 2018   T: 2018   MT: UBALDO      Name:     JOSIE ARRIAGA   MRN:      [id number]        Account:      [id number]   :      1933           Service Date: 2018      Document: [id number]         Outpatient Encounter Prescriptions as of 2018   Medication Sig Dispense Refill     acetaminophen (TYLENOL) 325 MG tablet Take 325-650 mg by mouth every 6 hours as needed for mild pain       ADVAIR DISKUS 100-50 MCG/DOSE diskus inhaler INHALE 1 PUFF EVERY 12 HOURS 3 Inhaler 3     albuterol (PROAIR HFA/PROVENTIL  HFA/VENTOLIN HFA) 108 (90 BASE) MCG/ACT Inhaler Inhale 2 puffs into the lungs every 6 hours as needed for shortness of breath / dyspnea 1 Inhaler 3     atorvastatin (LIPITOR) 20 MG tablet Take 1 tablet (20 mg) by mouth daily 90 tablet 3     buPROPion (WELLBUTRIN SR) 100 MG 12 hr tablet Take 1 tablet (100 mg) by mouth 2 times daily 60 tablet 3     Cholecalciferol (VITAMIN D) 2000 UNITS CAPS Take by mouth daily       clopidogrel (PLAVIX) 75 MG tablet Take 1 tablet (75 mg) by mouth daily 90 tablet 3     DULoxetine (CYMBALTA) 60 MG EC capsule Take 1 capsule (60 mg) by mouth daily 90 capsule 1     fluticasone (FLONASE) 50 MCG/ACT nasal spray Spray 1 spray into both nostrils as needed        HYDROcodone-acetaminophen (NORCO) 5-325 MG per tablet Take 1 tablet by mouth every 8 hours as needed for moderate to severe pain 90 tablet 0     IBANdronate (BONIVA) 150 MG tablet Take 150 mg by mouth every 30 days Patient takes on the first day of each month.       lisinopril (PRINIVIL/ZESTRIL) 20 MG tablet Take 0.5 tablets (10 mg) by mouth daily Hold until review creatinine on provider visit 90 tablet 0     Menthol, Topical Analgesic, (ICY HOT EX) Apply to affected area every morning       Nitroglycerin (NITROQUICK SL) Place 0.4 mg under the tongue every 5 minutes as needed        ranitidine (ZANTAC) 150 MG tablet Take 1 tablet (150 mg) by mouth 2 times daily 60 tablet 1     blood glucose monitoring (NO BRAND SPECIFIED) meter device kit Use to test blood sugar 1-2 times daily or as directed. 1 kit 0     blood glucose monitoring (TRUE METRIX BLOOD GLUCOSE TEST) test strip 1 strip by In Vitro route daily 200 strip 3     Blood Glucose Monitoring Suppl (TRUE METRIX AIR GLUCOSE METER) W/DEVICE KIT USE AS DIRECTED 1 kit 0     furosemide (LASIX) 20 MG tablet Take 1 tablet (20 mg) by mouth daily as needed (Patient not taking: Reported on 9/17/2018) 15 tablet 1     neomycin-polymyxin-hydrocortisone (CORTISPORIN) otic solution Place 4 drops  into both ears 4 times daily (Patient taking differently: Place 4 drops into both ears 4 times daily as needed (Ear infections from swimming) ) 10 mL 0     NIFEdipine ER osmotic (ADALAT CC) 30 MG TB24 Take 1 tablet (30 mg) by mouth daily 14 tablet 0     nystatin-triamcinolone (MYCOLOG II) cream APPLY TOPICALLY TWICE DAILY (Patient taking differently: APPLY TOPICALLY TDaily) 60 g 1     order for DME DREAMSTATION  5-15 CM/H20  NASAL WISP FABRIC       TRUEPLUS LANCETS 28G MISC 1 each 2 times daily as needed 100 each 3     [DISCONTINUED] metoprolol succinate (TOPROL-XL) 100 MG 24 hr tablet TAKE 1 TABLET EVERY DAY  (DUE  FOR  OFFICE  VISIT, PLEASE MAKE APPT.) (Patient not taking: Reported on 9/17/2018) 90 tablet 3     No facility-administered encounter medications on file as of 9/17/2018.        Again, thank you for allowing me to participate in the care of your patient.      Sincerely,    Jozef Sinha MD     [facility]

## 2018-09-17 NOTE — MR AVS SNAPSHOT
After Visit Summary   9/17/2018    Moira Andre    MRN: [id number]           Patient Information     Date Of Birth          9/24/1933        Visit Information        Provider Department      9/17/2018 1:15 PM Jozef Sinha MD [facility]        Today's Diagnoses     Coronary artery disease involving native coronary artery of native heart without angina pectoris    -  1       Follow-ups after your visit        Additional Services     Follow-Up with Cardiologist                 Your next 10 appointments already scheduled     Oct 04, 2018 10:00 AM CDT   Office Visit with Maryan Churchill MD   [facility] ([facility]    [address] [facility] [address] [phone]           Bring a current list of meds and any records pertaining to this visit. For Physicals, please bring immunization records and any forms needing to be filled out. Please arrive 10 minutes early to complete paperwork.            Dec 03, 2018  9:00 AM CST   Office Visit with Maryan Churchill MD   [facility] ([facility])    [address] [facility] [address] [phone]           Bring a current list of meds and any records pertaining to this visit. For Physicals, please bring immunization records and any forms needing to be filled out. Please arrive 10 minutes early to complete paperwork.              Future tests that were ordered for you today     Open Future Orders        Priority Expected Expires Ordered    Follow-Up with Cardiologist Routine 9/16/2020 10/6/2020 9/17/2018    ECHO COMPLETE WITH OPTISON Routine 10/1/2018 10/2/2018 8/1/2017            Who to contact     If you have questions or need follow up information about today's clinic visit or your schedule please contact [facility] directly at [phone].  Normal or non-critical lab and imaging results will be communicated to  "you by MyChart, letter or phone within 4 business days after the clinic has received the results. If you do not hear from us within 7 days, please contact the clinic through MyChart or phone. If you have a critical or abnormal lab result, we will notify you by phone as soon as possible.  Submit refill requests through [employer] or call your pharmacy and they will forward the refill request to us. Please allow 3 business days for your refill to be completed.          Additional Information About Your Visit        Care EveryWhere ID     This is your Care EveryWhere ID. This could be used by other organizations to access your [place] medical records  [id number]        Your Vitals Were     Pulse Height BMI (Body Mass Index)             66 1.702 m (5' 7.01\") 43.84 kg/m2          Blood Pressure from Last 3 Encounters:   09/17/18 124/72   08/31/18 140/80   08/24/18 126/52    Weight from Last 3 Encounters:   09/17/18 127 kg (280 lb)   08/31/18 129.3 kg (285 lb)   08/06/18 129 kg (284 lb 8 oz)                 Today's Medication Changes          These changes are accurate as of 9/17/18  1:46 PM.  If you have any questions, ask your nurse or doctor.               These medicines have changed or have updated prescriptions.        Dose/Directions    neomycin-polymyxin-hydrocortisone otic solution   Commonly known as:  CORTISPORIN   This may have changed:    - when to take this  - reasons to take this   Used for:  Left ear pain        Dose:  4 drop   Place 4 drops into both ears 4 times daily   Quantity:  10 mL   Refills:  0       nystatin-triamcinolone cream   Commonly known as:  MYCOLOG II   This may have changed:  See the new instructions.   Used for:  Tinea of the body        APPLY TOPICALLY TWICE DAILY   Quantity:  60 g   Refills:  1         Stop taking these medicines if you haven't already. Please contact your care team if you have questions.     metoprolol succinate 100 MG 24 hr tablet   Commonly known as:  TOPROL-XL "   Stopped by:  Jozef Sinha MD                    Primary Care Provider Office Phone # Fax #    Maryan ALLRED MD [place] [phone] [address] [address]        Equal Access to Services     [facility]: Hadii aad ku hadahsano Yasmineali, waaxda luqadaha, qaybta kaalmada adeegyada, waxay idiin hayaan adege mckeon lamarielle . So [facility] [phone].    ATENCIÓN: Si habla español, tiene a gold disposición servicios gratuitos de asistencia lingüística. Llame al [phone].    We comply with applicable federal civil rights laws and [place] laws. We do not discriminate on the basis of race, color, national origin, age, disability, sex, sexual orientation, or gender identity.            Thank you!     Thank you for choosing [facility]  for your care. Our goal is always to provide you with excellent care. Hearing back from our patients is one way we can continue to improve our services. Please take a few minutes to complete the written survey that you may receive in the mail after your visit with us. Thank you!             Your Updated Medication List - Protect others around you: Learn how to safely use, store and throw away your medicines at www.disposemymeds.org.          This list is accurate as of 9/17/18  1:46 PM.  Always use your most recent med list.                   Brand Name Dispense Instructions for use Diagnosis    acetaminophen 325 MG tablet    TYLENOL     Take 325-650 mg by mouth every 6 hours as needed for mild pain        ADVAIR DISKUS 100-50 MCG/DOSE diskus inhaler   Generic drug:  fluticasone-salmeterol     3 Inhaler    INHALE 1 PUFF EVERY 12 HOURS    Intermittent asthma, uncomplicated       albuterol 108 (90 Base) MCG/ACT inhaler    PROAIR HFA/PROVENTIL HFA/VENTOLIN HFA    1 Inhaler    Inhale 2 puffs into the lungs every 6 hours as needed for shortness of breath / dyspnea    Intermittent asthma, uncomplicated        atorvastatin 20 MG tablet    LIPITOR    90 tablet    Take 1 tablet (20 mg) by mouth daily    Hyperlipidemia LDL goal <70       * blood glucose monitoring meter device kit    no brand specified    1 kit    Use to test blood sugar 1-2 times daily or as directed.    Type 2 diabetes mellitus with diabetic nephropathy (H)       * TRUE METRIX AIR GLUCOSE METER w/Device Kit     1 kit    USE AS DIRECTED    Type 2 diabetes mellitus with diabetic nephropathy (H)       blood glucose monitoring test strip    TRUE METRIX BLOOD GLUCOSE TEST    200 strip    1 strip by In Vitro route daily    Type 2 diabetes mellitus with diabetic nephropathy, without long-term current use of insulin (H)       buPROPion 100 MG 12 hr tablet    WELLBUTRIN SR    60 tablet    Take 1 tablet (100 mg) by mouth 2 times daily    Moderate episode of recurrent major depressive disorder (H)       clopidogrel 75 MG tablet    PLAVIX    90 tablet    Take 1 tablet (75 mg) by mouth daily    Coronary artery disease involving native coronary artery of native heart without angina pectoris       DULoxetine 60 MG EC capsule    CYMBALTA    90 capsule    Take 1 capsule (60 mg) by mouth daily    Moderate major depression (H)       FLONASE 50 MCG/ACT spray   Generic drug:  fluticasone      Spray 1 spray into both nostrils as needed        furosemide 20 MG tablet    LASIX    15 tablet    Take 1 tablet (20 mg) by mouth daily as needed    Edema of lower extremity       HYDROcodone-acetaminophen 5-325 MG per tablet    NORCO    90 tablet    Take 1 tablet by mouth every 8 hours as needed for moderate to severe pain    Multiple joint pain       IBANdronate 150 MG tablet    BONIVA     Take 150 mg by mouth every 30 days Patient takes on the first day of each month.        ICY HOT EX      Apply to affected area every morning        lisinopril 20 MG tablet    PRINIVIL/ZESTRIL    90 tablet    Take 0.5 tablets (10 mg) by mouth daily Hold until review creatinine on provider visit     Essential hypertension       neomycin-polymyxin-hydrocortisone otic solution    CORTISPORIN    10 mL    Place 4 drops into both ears 4 times daily    Left ear pain       NIFEdipine ER 30 MG Tb24    ADALAT CC    14 tablet    Take 1 tablet (30 mg) by mouth daily    Ureteral stone       NITROQUICK SL      Place 0.4 mg under the tongue every 5 minutes as needed        nystatin-triamcinolone cream    MYCOLOG II    60 g    APPLY TOPICALLY TWICE DAILY    Tinea of the body       order for DME      DREAMSTATION 5-15 CM/H20 NASAL WISP FABRIC        ranitidine 150 MG tablet    ZANTAC    60 tablet    Take 1 tablet (150 mg) by mouth 2 times daily    Dry cough       TRUEPLUS LANCETS 28G Misc     100 each    1 each 2 times daily as needed    Type 2 diabetes mellitus with diabetic nephropathy (H)       vitamin D 2000 units Caps      Take by mouth daily        * Notice:  This list has 2 medication(s) that are the same as other medications prescribed for you. Read the directions carefully, and ask your doctor or other care provider to review them with you.

## 2018-09-17 NOTE — PROGRESS NOTES
Service Date: 09/17/2018      HISTORY OF PRESENT ILLNESS:  I had the pleasure of seeing our mutual patient, Moira Andre, today at the [facility] in [place].  IF YOU COULD PLEASE DO ME A FAVOR, PLEASE REVIEW THE PATIENT'S MEDICATIONS OR HAVE YOUR NURSE CALL THE PATIENT.  As you know, from a cardiac standpoint we have been seeing her, 11 years ago we stented her OM vessel and her diagonal, she had a normal ejection fraction.  In 2013 what we thought was angina prompted an angiogram, but it showed that the arteries were open.  There was 50% left circumflex narrowing, but the flow reserve tests were all normal.  All of the other vessels had 30% narrowing or less.  The patient does have a small left ventricle with some left ventricular hypertrophy.  She had some borderline LV outflow tract obstruction.  I am happy to report the current echo shows again ejection fraction greater than 70% but no reports of obstruction.  She does have mild mitral stenosis from mitral annular calcification.  She has no significant aortic valve stenosis even though she has an aortic outflow murmur.  Her right heart was slightly enlarged with normal ejection fraction that may be a reflection of the fact that she had sleep apnea and for a long time did not use her CPAP machine, but she is now using it.  She has trivial ankle edema.  As you know, she was in the hospital recently and you just saw her in the office about 2 weeks ago.  She came with abdominal pain which turned out to be a kidney stone.  She never made the followup visit with the urologist.  What I need your help with is her medicines.  The medicines were changed back and forth in the hospital because she had some lactic acidosis and a rising creatinine.  She was placed back on lisinopril in your office about 2 weeks ago.  The creatinine when you saw her was 1.43, whereas it was 1.2 a year ago.  Her carbon dioxide was normal.  The patient cannot name her medicines.  It looks  like she is off the beta blocker.  I do not know why, but at this point, her blood pressure and pulse are fine and she does not have any ischemic lesions, so I am not too concerned that she is off the beta blocker, but I am not really sure what she is on and she is not sure what she is on, and I suspect she probably needs to have another electrolyte panel checked if the lisinopril was restarted and I will ask if you could kindly do that.  From a cardiac standpoint, then, I think she is stable to the point that I am going to wait and see her in 2 years.  I do not anticipate a stress test or another echo for at least a few years unless there is a change in her clinical history, but again if you could kindly work through with her or have your nurse check and see what medicines she is actually on and see if she is due for blood work based on those, given that she had metabolic acidosis and acute renal insufficiency when she was in the hospital last month.      Today's visit was 20 minutes, greater than 50% counseling.      cc:   Maryan Churchill MD    [address], [address] [place] [address]         ALAYNA CORREA MD             D: 2018   T: 2018   MT: UBALDO      Name:     JOSIE ARRIAGA   MRN:      [id number]        Account:      [id number]   :      1933           Service Date: 2018      Document: [id number]

## 2018-09-17 NOTE — LETTER
9/17/2018    Maryan Churchill MD  [address]  [facility] [address]    RE: Moiar Andre       Dear Colleague,    I had the pleasure of seeing Moira Andre in the [facility].    HPI and Plan:   See dictation    Orders Placed This Encounter   Procedures     Follow-Up with Cardiologist     No orders of the defined types were placed in this encounter.    Medications Discontinued During This Encounter   Medication Reason     metoprolol succinate (TOPROL-XL) 100 MG 24 hr tablet          Encounter Diagnosis   Name Primary?     Coronary artery disease involving native coronary artery of native heart without angina pectoris Yes       CURRENT MEDICATIONS:  Current Outpatient Prescriptions   Medication Sig Dispense Refill     acetaminophen (TYLENOL) 325 MG tablet Take 325-650 mg by mouth every 6 hours as needed for mild pain       ADVAIR DISKUS 100-50 MCG/DOSE diskus inhaler INHALE 1 PUFF EVERY 12 HOURS 3 Inhaler 3     albuterol (PROAIR HFA/PROVENTIL HFA/VENTOLIN HFA) 108 (90 BASE) MCG/ACT Inhaler Inhale 2 puffs into the lungs every 6 hours as needed for shortness of breath / dyspnea 1 Inhaler 3     atorvastatin (LIPITOR) 20 MG tablet Take 1 tablet (20 mg) by mouth daily 90 tablet 3     buPROPion (WELLBUTRIN SR) 100 MG 12 hr tablet Take 1 tablet (100 mg) by mouth 2 times daily 60 tablet 3     Cholecalciferol (VITAMIN D) 2000 UNITS CAPS Take by mouth daily       clopidogrel (PLAVIX) 75 MG tablet Take 1 tablet (75 mg) by mouth daily 90 tablet 3     DULoxetine (CYMBALTA) 60 MG EC capsule Take 1 capsule (60 mg) by mouth daily 90 capsule 1     fluticasone (FLONASE) 50 MCG/ACT nasal spray Spray 1 spray into both nostrils as needed        HYDROcodone-acetaminophen (NORCO) 5-325 MG per tablet Take 1 tablet by mouth every 8 hours as needed for moderate to severe pain 90 tablet 0     IBANdronate (BONIVA) 150 MG tablet Take 150 mg by mouth every 30 days Patient takes on the first  day of each month.       lisinopril (PRINIVIL/ZESTRIL) 20 MG tablet Take 0.5 tablets (10 mg) by mouth daily Hold until review creatinine on provider visit 90 tablet 0     Menthol, Topical Analgesic, (ICY HOT EX) Apply to affected area every morning       Nitroglycerin (NITROQUICK SL) Place 0.4 mg under the tongue every 5 minutes as needed        ranitidine (ZANTAC) 150 MG tablet Take 1 tablet (150 mg) by mouth 2 times daily 60 tablet 1     blood glucose monitoring (NO BRAND SPECIFIED) meter device kit Use to test blood sugar 1-2 times daily or as directed. 1 kit 0     blood glucose monitoring (TRUE METRIX BLOOD GLUCOSE TEST) test strip 1 strip by In Vitro route daily 200 strip 3     Blood Glucose Monitoring Suppl (TRUE METRIX AIR GLUCOSE METER) W/DEVICE KIT USE AS DIRECTED 1 kit 0     furosemide (LASIX) 20 MG tablet Take 1 tablet (20 mg) by mouth daily as needed (Patient not taking: Reported on 9/17/2018) 15 tablet 1     neomycin-polymyxin-hydrocortisone (CORTISPORIN) otic solution Place 4 drops into both ears 4 times daily (Patient taking differently: Place 4 drops into both ears 4 times daily as needed (Ear infections from swimming) ) 10 mL 0     NIFEdipine ER osmotic (ADALAT CC) 30 MG TB24 Take 1 tablet (30 mg) by mouth daily 14 tablet 0     nystatin-triamcinolone (MYCOLOG II) cream APPLY TOPICALLY TWICE DAILY (Patient taking differently: APPLY TOPICALLY TDaily) 60 g 1     order for DME DREAMSTATION  5-15 CM/H20  NASAL WISP FABRIC       TRUEPLUS LANCETS 28G MISC 1 each 2 times daily as needed 100 each 3       ALLERGIES     Allergies   Allergen Reactions     Aspirin Hives     Reaction occurred during childhood.      Metformin      Elevated lactic acid     Minocycline      Yellow Dye Allergy. Minocycline has Yellow Dye #10.     Yellow Dye Hives     Rxn to yellow tablet. Eyes swelled shut.        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     Myocardial infarction 2007, cath 2013 ml    BMS: stent to OM, diag, nl EF, echo /C angia  , f/u cath no lesion >40%     Nephrolithiasis     right side     OA (osteoarthritis) of knee      Obesity      Rheumatoid arthritis flare (H)     prednisone     Sleep apnea     restarted using cpap      TIA (transient ischaemic attack)      Ventral hernia, unspecified, without mention of obstruction or gangrene        PAST SURGICAL HISTORY:  Past Surgical History:   Procedure Laterality Date     APPENDECTOMY       BIOPSY BREAST      x2 -needle & lumpectomy-benign     CHOLECYSTECTOMY       CORONARY ANGIOGRAPHY ADULT ORDER  2007    Bare metal stent to OM1, Diagonal patent      CORONARY ANGIOGRAPHY ADULT ORDER  2007    [place] stent to Diagonal     HC LEFT HEART CATHETERIZATION  2013    Moderate CAD     HYSTERECTOMY TOTAL ABDOMINAL       ORTHOPEDIC SURGERY      knee replacement on right side (), Left side ()     RELEASE CARPAL TUNNEL      right and left     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       FAMILY HISTORY:  Family History   Problem Relation Age of Onset     Neurologic Disorder Mother      MS - at 60's     C.A.D. Father       at 8o's, ? prostate ca     Breast Cancer No family hx of      Cancer - colorectal No family hx of        SOCIAL HISTORY:  Social History     Social History     Marital status:      Spouse name: N/A     Number of children: N/A     Years of education: N/A     Social History  "Main Topics     Smoking status: Former Smoker     Packs/day: 0.25     Types: Cigarettes     Start date: 1972     Quit date: 4/24/1973     Smokeless tobacco: Never Used     Alcohol use 0.0 - 0.6 oz/week     0 - 1 Standard drinks or equivalent per week      Comment: rarely     Drug use: No     Sexual activity: Not Currently     Partners: Male     Other Topics Concern     Caffeine Concern No     Sleep Concern No     Stress Concern No     Weight Concern No     Special Diet No     Exercise Yes     walking, swimming x2 a week     Seat Belt Yes     Social History Narrative    , 1 step son    Work- clown for profession        Tobacco- none,smoked for couple months in 1970's    ETOH- occ 1/2 months    Diet coke- 2-3 cans/day    Exercise- swims 8 laps -3/week               Review of Systems:  Skin:  Negative     Eyes:  Positive for glasses  ENT:  Positive for nasal congestion  Respiratory:  Positive for shortness of breath;dyspnea on exertion;cough;wheezing  Cardiovascular:    Positive for;fatigue;lightheadedness  Gastroenterology: Negative    Genitourinary:  not assessed    Musculoskeletal:  Positive for joint pain  Neurologic:  Negative    Psychiatric:  Negative    Heme/Lymph/Imm:  Negative    Endocrine:  Positive for diabetes    Physical Exam:  Vitals: /72  Pulse 66  Ht 1.702 m (5' 7.01\")  Wt 127 kg (280 lb)  BMI 43.84 kg/m2    Constitutional:  cooperative, alert and oriented, well developed, well nourished, in no acute distress        Skin:  warm and dry to the touch, no apparent skin lesions or masses noted          Head:  normocephalic, no masses or lesions        Eyes:  pupils equal and round, conjunctivae and lids unremarkable, sclera white, no xanthalasma, EOMS intact, no nystagmus        Lymph:No Cervical lymphadenopathy present     ENT:  no pallor or cyanosis, dentition good        Neck:  carotid pulses are full and equal bilaterally, JVP normal, no carotid bruit        Respiratory:  normal breath " sounds, clear to auscultation, normal A-P diameter, normal symmetry, normal respiratory excursion, no use of accessory muscles         Cardiac: regular rhythm;normal S1 and S2       grade 1;early systolic murmur                                                 GI:  abdomen soft, non-tender, BS normoactive, no mass, no HSM, no bruits obese difficult exam    Extremities and Muscular Skeletal:  no deformities, clubbing, cyanosis, erythema observed   bilateral LE edema;trace          Neurological:  no gross motor deficits        Psych:  Alert and Oriented x 3      Recent Lab Results:  LIPID RESULTS:  Lab Results   Component Value Date    CHOL 149 08/06/2018    HDL 45 (L) 08/06/2018    LDL 46 08/06/2018    TRIG 290 (H) 08/06/2018    CHOLHDLRATIO 2.3 10/13/2015       LIVER ENZYME RESULTS:  Lab Results   Component Value Date    AST 13 08/22/2018    ALT 17 08/22/2018       CBC RESULTS:  Lab Results   Component Value Date    WBC 8.5 08/24/2018    RBC 4.11 08/24/2018    HGB 11.2 (L) 08/24/2018    HCT 36.0 08/24/2018    MCV 88 08/24/2018    MCH 27.3 08/24/2018    MCHC 31.1 (L) 08/24/2018    RDW 14.5 08/24/2018     08/24/2018       BMP RESULTS:  Lab Results   Component Value Date     08/31/2018    POTASSIUM 4.9 08/31/2018    CHLORIDE 104 08/31/2018    CO2 23 08/31/2018    ANIONGAP 11 08/31/2018     (H) 08/31/2018    BUN 25 08/31/2018    CR 1.43 (H) 08/31/2018    GFRESTIMATED 35 (L) 08/31/2018    GFRESTBLACK 42 (L) 08/31/2018    TELLY 9.5 08/31/2018        A1C RESULTS:  Lab Results   Component Value Date    A1C 7.6 (H) 07/04/2018       INR RESULTS:  Lab Results   Component Value Date    INR 0.93 07/19/2017    INR 0.99 09/21/2016           CC  Maryan Churchill MD  [address]  [place] [address]    Thank you for allowing me to participate in the care of your patient.      Sincerely,     Jozef Sinha MD     [facility]    cc:   Maryan Churchill MD  [address] [facility] [address]  [place] [address]

## 2018-09-17 NOTE — PROGRESS NOTES
HPI and Plan:   See dictation    Orders Placed This Encounter   Procedures     Follow-Up with Cardiologist     No orders of the defined types were placed in this encounter.    Medications Discontinued During This Encounter   Medication Reason     metoprolol succinate (TOPROL-XL) 100 MG 24 hr tablet          Encounter Diagnosis   Name Primary?     Coronary artery disease involving native coronary artery of native heart without angina pectoris Yes       CURRENT MEDICATIONS:  Current Outpatient Prescriptions   Medication Sig Dispense Refill     acetaminophen (TYLENOL) 325 MG tablet Take 325-650 mg by mouth every 6 hours as needed for mild pain       ADVAIR DISKUS 100-50 MCG/DOSE diskus inhaler INHALE 1 PUFF EVERY 12 HOURS 3 Inhaler 3     albuterol (PROAIR HFA/PROVENTIL HFA/VENTOLIN HFA) 108 (90 BASE) MCG/ACT Inhaler Inhale 2 puffs into the lungs every 6 hours as needed for shortness of breath / dyspnea 1 Inhaler 3     atorvastatin (LIPITOR) 20 MG tablet Take 1 tablet (20 mg) by mouth daily 90 tablet 3     buPROPion (WELLBUTRIN SR) 100 MG 12 hr tablet Take 1 tablet (100 mg) by mouth 2 times daily 60 tablet 3     Cholecalciferol (VITAMIN D) 2000 UNITS CAPS Take by mouth daily       clopidogrel (PLAVIX) 75 MG tablet Take 1 tablet (75 mg) by mouth daily 90 tablet 3     DULoxetine (CYMBALTA) 60 MG EC capsule Take 1 capsule (60 mg) by mouth daily 90 capsule 1     fluticasone (FLONASE) 50 MCG/ACT nasal spray Spray 1 spray into both nostrils as needed        HYDROcodone-acetaminophen (NORCO) 5-325 MG per tablet Take 1 tablet by mouth every 8 hours as needed for moderate to severe pain 90 tablet 0     IBANdronate (BONIVA) 150 MG tablet Take 150 mg by mouth every 30 days Patient takes on the first day of each month.       lisinopril (PRINIVIL/ZESTRIL) 20 MG tablet Take 0.5 tablets (10 mg) by mouth daily Hold until review creatinine on provider visit 90 tablet 0     Menthol, Topical Analgesic, (ICY HOT EX) Apply to affected area  every morning       Nitroglycerin (NITROQUICK SL) Place 0.4 mg under the tongue every 5 minutes as needed        ranitidine (ZANTAC) 150 MG tablet Take 1 tablet (150 mg) by mouth 2 times daily 60 tablet 1     blood glucose monitoring (NO BRAND SPECIFIED) meter device kit Use to test blood sugar 1-2 times daily or as directed. 1 kit 0     blood glucose monitoring (TRUE METRIX BLOOD GLUCOSE TEST) test strip 1 strip by In Vitro route daily 200 strip 3     Blood Glucose Monitoring Suppl (TRUE METRIX AIR GLUCOSE METER) W/DEVICE KIT USE AS DIRECTED 1 kit 0     furosemide (LASIX) 20 MG tablet Take 1 tablet (20 mg) by mouth daily as needed (Patient not taking: Reported on 9/17/2018) 15 tablet 1     neomycin-polymyxin-hydrocortisone (CORTISPORIN) otic solution Place 4 drops into both ears 4 times daily (Patient taking differently: Place 4 drops into both ears 4 times daily as needed (Ear infections from swimming) ) 10 mL 0     NIFEdipine ER osmotic (ADALAT CC) 30 MG TB24 Take 1 tablet (30 mg) by mouth daily 14 tablet 0     nystatin-triamcinolone (MYCOLOG II) cream APPLY TOPICALLY TWICE DAILY (Patient taking differently: APPLY TOPICALLY TDaily) 60 g 1     order for DME DREAMSTATION  5-15 CM/H20  NASAL WISP FABRIC       TRUEPLUS LANCETS 28G MISC 1 each 2 times daily as needed 100 each 3       ALLERGIES     Allergies   Allergen Reactions     Aspirin Hives     Reaction occurred during childhood.      Metformin      Elevated lactic acid     Minocycline      Yellow Dye Allergy. Minocycline has Yellow Dye #10.     Yellow Dye Hives     Rxn to yellow tablet. Eyes swelled shut.        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     Myocardial infarction 2007, cath 2013 ml    BMS: stent to OM, diag, nl EF, echo /C angia  , f/u cath no lesion >40%     Nephrolithiasis     right side     OA (osteoarthritis) of knee      Obesity      Rheumatoid arthritis flare (H)     prednisone     Sleep apnea     restarted using cpap      TIA (transient ischaemic attack)      Ventral hernia, unspecified, without mention of obstruction or gangrene        PAST SURGICAL HISTORY:  Past Surgical History:   Procedure Laterality Date     APPENDECTOMY       BIOPSY BREAST      x2 -needle & lumpectomy-benign     CHOLECYSTECTOMY       CORONARY ANGIOGRAPHY ADULT ORDER  2007    Bare metal stent to OM1, Diagonal patent      CORONARY ANGIOGRAPHY ADULT ORDER  2007    [place] stent to Diagonal     HC LEFT HEART CATHETERIZATION  2013    Moderate CAD     HYSTERECTOMY TOTAL ABDOMINAL       ORTHOPEDIC SURGERY      knee replacement on right side (), Left side ()     RELEASE CARPAL TUNNEL      right and left     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       FAMILY HISTORY:  Family History   Problem Relation Age of Onset     Neurologic Disorder Mother      MS - at 60's     C.A.D. Father       at 8o's, ? prostate ca     Breast Cancer No family hx of      Cancer - colorectal No family hx of        SOCIAL HISTORY:  Social History     Social History     Marital status:      Spouse name: N/A     Number of children: N/A     Years of education: N/A     Social History Main Topics     Smoking status: Former Smoker     Packs/day: 0.25     Types: Cigarettes     Start date:      Quit date: 1973     Smokeless tobacco: Never Used     Alcohol use 0.0 - 0.6 oz/week     0 - 1 Standard drinks or  "equivalent per week      Comment: rarely     Drug use: No     Sexual activity: Not Currently     Partners: Male     Other Topics Concern     Caffeine Concern No     Sleep Concern No     Stress Concern No     Weight Concern No     Special Diet No     Exercise Yes     walking, swimming x2 a week     Seat Belt Yes     Social History Narrative    , 1 step son    Work- clown for profession        Tobacco- none,smoked for couple months in 1970's    ETOH- occ 1/2 months    Diet coke- 2-3 cans/day    Exercise- swims 8 laps -3/week               Review of Systems:  Skin:  Negative     Eyes:  Positive for glasses  ENT:  Positive for nasal congestion  Respiratory:  Positive for shortness of breath;dyspnea on exertion;cough;wheezing  Cardiovascular:    Positive for;fatigue;lightheadedness  Gastroenterology: Negative    Genitourinary:  not assessed    Musculoskeletal:  Positive for joint pain  Neurologic:  Negative    Psychiatric:  Negative    Heme/Lymph/Imm:  Negative    Endocrine:  Positive for diabetes    Physical Exam:  Vitals: /72  Pulse 66  Ht 1.702 m (5' 7.01\")  Wt 127 kg (280 lb)  BMI 43.84 kg/m2    Constitutional:  cooperative, alert and oriented, well developed, well nourished, in no acute distress        Skin:  warm and dry to the touch, no apparent skin lesions or masses noted          Head:  normocephalic, no masses or lesions        Eyes:  pupils equal and round, conjunctivae and lids unremarkable, sclera white, no xanthalasma, EOMS intact, no nystagmus        Lymph:No Cervical lymphadenopathy present     ENT:  no pallor or cyanosis, dentition good        Neck:  carotid pulses are full and equal bilaterally, JVP normal, no carotid bruit        Respiratory:  normal breath sounds, clear to auscultation, normal A-P diameter, normal symmetry, normal respiratory excursion, no use of accessory muscles         Cardiac: regular rhythm;normal S1 and S2       grade 1;early systolic murmur                    "                              GI:  abdomen soft, non-tender, BS normoactive, no mass, no HSM, no bruits obese difficult exam    Extremities and Muscular Skeletal:  no deformities, clubbing, cyanosis, erythema observed   bilateral LE edema;trace          Neurological:  no gross motor deficits        Psych:  Alert and Oriented x 3      Recent Lab Results:  LIPID RESULTS:  Lab Results   Component Value Date    CHOL 149 08/06/2018    HDL 45 (L) 08/06/2018    LDL 46 08/06/2018    TRIG 290 (H) 08/06/2018    CHOLHDLRATIO 2.3 10/13/2015       LIVER ENZYME RESULTS:  Lab Results   Component Value Date    AST 13 08/22/2018    ALT 17 08/22/2018       CBC RESULTS:  Lab Results   Component Value Date    WBC 8.5 08/24/2018    RBC 4.11 08/24/2018    HGB 11.2 (L) 08/24/2018    HCT 36.0 08/24/2018    MCV 88 08/24/2018    MCH 27.3 08/24/2018    MCHC 31.1 (L) 08/24/2018    RDW 14.5 08/24/2018     08/24/2018       BMP RESULTS:  Lab Results   Component Value Date     08/31/2018    POTASSIUM 4.9 08/31/2018    CHLORIDE 104 08/31/2018    CO2 23 08/31/2018    ANIONGAP 11 08/31/2018     (H) 08/31/2018    BUN 25 08/31/2018    CR 1.43 (H) 08/31/2018    GFRESTIMATED 35 (L) 08/31/2018    GFRESTBLACK 42 (L) 08/31/2018    TELLY 9.5 08/31/2018        A1C RESULTS:  Lab Results   Component Value Date    A1C 7.6 (H) 07/04/2018       INR RESULTS:  Lab Results   Component Value Date    INR 0.93 07/19/2017    INR 0.99 09/21/2016           CC  Maryan Churchill MD  [address]  [place] [address]

## 2018-10-04 ENCOUNTER — OFFICE VISIT (OUTPATIENT)
Dept: FAMILY MEDICINE | Facility: CLINIC | Age: 83
End: 2018-10-04
Payer: COMMERCIAL

## 2018-10-04 VITALS
WEIGHT: 283 LBS | HEART RATE: 88 BPM | BODY MASS INDEX: 44.31 KG/M2 | OXYGEN SATURATION: 94 % | DIASTOLIC BLOOD PRESSURE: 78 MMHG | TEMPERATURE: 97.9 F | SYSTOLIC BLOOD PRESSURE: 148 MMHG | RESPIRATION RATE: 20 BRPM

## 2018-10-04 DIAGNOSIS — R25.1 TREMOR: ICD-10-CM

## 2018-10-04 DIAGNOSIS — K43.9 VENTRAL HERNIA WITHOUT OBSTRUCTION OR GANGRENE: ICD-10-CM

## 2018-10-04 DIAGNOSIS — E11.21 TYPE 2 DIABETES MELLITUS WITH DIABETIC NEPHROPATHY, WITHOUT LONG-TERM CURRENT USE OF INSULIN (H): ICD-10-CM

## 2018-10-04 DIAGNOSIS — F32.1 MODERATE MAJOR DEPRESSION (H): ICD-10-CM

## 2018-10-04 DIAGNOSIS — N18.30 CKD (CHRONIC KIDNEY DISEASE) STAGE 3, GFR 30-59 ML/MIN (H): ICD-10-CM

## 2018-10-04 DIAGNOSIS — E66.01 MORBID OBESITY (H): ICD-10-CM

## 2018-10-04 DIAGNOSIS — I10 ESSENTIAL HYPERTENSION: Primary | ICD-10-CM

## 2018-10-04 PROCEDURE — 99214 OFFICE O/P EST MOD 30 MIN: CPT | Performed by: INTERNAL MEDICINE

## 2018-10-04 RX ORDER — GLIMEPIRIDE 2 MG/1
2 TABLET ORAL
Qty: 90 TABLET | Refills: 1 | Status: SHIPPED | OUTPATIENT
Start: 2018-10-04 | End: 2019-04-17

## 2018-10-04 RX ORDER — LISINOPRIL 10 MG/1
10 TABLET ORAL DAILY
COMMUNITY
Start: 2018-10-04 | End: 2018-10-04

## 2018-10-04 RX ORDER — LISINOPRIL 20 MG/1
20 TABLET ORAL DAILY
COMMUNITY
Start: 2018-10-04 | End: 2018-11-08 | Stop reason: SINTOL

## 2018-10-04 NOTE — PATIENT INSTRUCTIONS
Start Amaryl 2 mg every morning  Watch out for symptoms of hypoglycemia  Let me know how it works for you  Take lisinopril 20 mg daily instead of 10 mg  Monitor your blood pressure once a week at home.  Bring those readings on your next visit.  Notify us if your blood pressure readings consistently stays greater than 140/90.  Schedule an appointment for general surgery at your earliest convenience  Follow up in 3 months  Seek sooner medical attention if there is any worsening of symptoms or problems  Essential Tremor (ET)  What is essential tremor?  Essential tremor (ET) is a neurological disorder that causes your hands, head, trunk, voice, and/or legs to shake rhythmically. It is often confused with Parkinson disease.  ET is the most common trembling disorder that people experience. Everyone has some ET, but the movements usually cannot be seen or felt. When tremors are noticeable, the condition is classified as ET.  ET is most common among people older than age 65, but it can affect people at any age.  What causes ET?  ET can occur in different people for different reasons:    Familial essential tremor. In most people, the condition seems to be passed down from a parent to a child. If your parent has ET, there is a 50% chance that you or your children will inherit the gene responsible for the condition.    Essential tremor related to another disorder. Sometimes, a tremor is a symptom of another neurological disorder, such as Parkinson disease or dystonia. Sometimes, ET is mistaken for these other diseases when they are not present. A health care provider s careful diagnosis is extremely important.  The cause of ET isn t known. However, one theory suggests that your cerebellum and other parts of your brain are not communicating correctly. The cerebellum is a part of the brain that controls muscle coordination.  What are the symptoms of ET?  If you have ET, you will experience shaking and trembling at different  times and in different situations, but some characteristics are common to all. Here is what you might typically experience:    Tremors occur when you move and are less noticeable when you rest.    Certain medications, caffeine, or stress can make your tremors worse.    Tremors get worse as you age.    Tremors don t affect both sides of your body in the same way.  Here are different signs of essential tremor:    Tremors that are most obvious in your hands    Difficulty performing tasks with your hands, such as writing or using tools    Shaking or quivering sound in your voice    Uncontrollable head-nodding    In rare instances, tremors in your legs or feet  How is ET diagnosed?  Your rapid, uncontrollable trembling, as well as questions about your medical and family history, can help your health care provider determine if you have familial ET. He or she will probably need to rule out other conditions that could cause shaking or trembling. For example, tremors could be symptoms of diseases, such as hyperthyroidism. Your health care provider might test you for those, as well.  In some cases, the tremors might be related to other factors. To find out for certain, your health care provider may have you try to:    Abstain from alcohol; if you re an alcoholic, trembling is a common symptom.    Cut out cigarette smoking.    Avoid caffeine.    Avoid certain medications  How is ET treated?   Propanolol and primidone are 2 medications often prescribed to treat ET. Propanolol blocks the stimulating action of neurotransmitters to calm your trembling. Primidone is a common antiseizure medication that also controls the actions of neurotransmitters.  Gabapentin and topiramate are 2 other antiseizure medications that are sometimes prescribed. In some cases, tranquilizers like alprazolam or clonazepam might be suggested.  For ET in your hands, botulinum toxin (Botox) injections have shown some promise in easing the trembling. They  work by weakening the surrounding muscles around your hands. For severe tremors, a stimulating device (deep brain stimulator) surgically implanted in your brain may help.  Can ET be prevented?   The specific cause of ET is not known, so scientists are not sure how the condition can be prevented.  Living with ET  ET is usually not dangerous, but it can certainly be frustrating if you have to deal with it. Certain factors can make tremors worse, so the following steps may help to decrease tremors:    Avoid alcohol, cigarettes, and caffeine    Avoid stressful situations as much as possible    Use relaxation techniques, such as yoga, deep-breathing exercises, or biofeedback    Check with your health care provider to see if any medications you re taking could be making your tremors worse.  Talk with your health care provider about other options, such as surgery, if ET starts to affect your quality of life.  When should I call my health care provider?  If you have been diagnosed with ET, talk with your health care provider about when you might need to call. He or she will likely advise you to call if your tremors become worse, or if you develop new neurologic symptoms, such as numbness or weakness.  Key points    ET is a neurological disorder that causes your hands, head, trunk, voice, or legs to shake rhythmically. The cause is not known, but it is often passed down from a parent to a child.    ET is sometimes confused with other types of tremor, so getting the right diagnosis is important.    Tremors tend to be worse during movement than when at rest. The tremors are usually not dangerous, but they can get worse over time.    Avoiding things that might make tremors worse, such as stress, caffeine, and certain medications, may be helpful. Medicines can also help control or limit tremors in some people. Severe tremors can sometimes be treated with surgery.  Next steps  Tips to help you get the most from a visit to your  health care provider:    Before your visit, write down questions you want answered.    Bring someone with you to help you ask questions and remember what your provider tells you.    At the visit, write down the names of new medicines, treatments, or tests, and any new instructions your provider gives you.    If you have a follow-up appointment, write down the date, time, and purpose for that visit.    Know how you can contact your provider if you have questions.  Essential Tremor (ET)  What is essential tremor?  Essential tremor (ET) is a neurological disorder that causes your hands, head, trunk, voice, and/or legs to shake rhythmically. It is often confused with Parkinson disease.  ET is the most common trembling disorder that people experience. Everyone has some ET, but the movements usually cannot be seen or felt. When tremors are noticeable, the condition is classified as ET.  ET is most common among people older than age 65, but it can affect people at any age.  What causes ET?  ET can occur in different people for different reasons:    Familial essential tremor. In most people, the condition seems to be passed down from a parent to a child. If your parent has ET, there is a 50% chance that you or your children will inherit the gene responsible for the condition.    Essential tremor related to another disorder. Sometimes, a tremor is a symptom of another neurological disorder, such as Parkinson disease or dystonia. Sometimes, ET is mistaken for these other diseases when they are not present. A health care provider s careful diagnosis is extremely important.  The cause of ET isn t known. However, one theory suggests that your cerebellum and other parts of your brain are not communicating correctly. The cerebellum is a part of the brain that controls muscle coordination.  What are the symptoms of ET?  If you have ET, you will experience shaking and trembling at different times and in different situations, but  some characteristics are common to all. Here is what you might typically experience:    Tremors occur when you move and are less noticeable when you rest.    Certain medications, caffeine, or stress can make your tremors worse.    Tremors get worse as you age.    Tremors don t affect both sides of your body in the same way.  Here are different signs of essential tremor:    Tremors that are most obvious in your hands    Difficulty performing tasks with your hands, such as writing or using tools    Shaking or quivering sound in your voice    Uncontrollable head-nodding    In rare instances, tremors in your legs or feet  How is ET diagnosed?  Your rapid, uncontrollable trembling, as well as questions about your medical and family history, can help your health care provider determine if you have familial ET. He or she will probably need to rule out other conditions that could cause shaking or trembling. For example, tremors could be symptoms of diseases, such as hyperthyroidism. Your health care provider might test you for those, as well.  In some cases, the tremors might be related to other factors. To find out for certain, your health care provider may have you try to:    Abstain from alcohol; if you re an alcoholic, trembling is a common symptom.    Cut out cigarette smoking.    Avoid caffeine.    Avoid certain medications  How is ET treated?   Propanolol and primidone are 2 medications often prescribed to treat ET. Propanolol blocks the stimulating action of neurotransmitters to calm your trembling. Primidone is a common antiseizure medication that also controls the actions of neurotransmitters.  Gabapentin and topiramate are 2 other antiseizure medications that are sometimes prescribed. In some cases, tranquilizers like alprazolam or clonazepam might be suggested.  For ET in your hands, botulinum toxin (Botox) injections have shown some promise in easing the trembling. They work by weakening the surrounding muscles  around your hands. For severe tremors, a stimulating device (deep brain stimulator) surgically implanted in your brain may help.  Can ET be prevented?   The specific cause of ET is not known, so scientists are not sure how the condition can be prevented.  Living with ET  ET is usually not dangerous, but it can certainly be frustrating if you have to deal with it. Certain factors can make tremors worse, so the following steps may help to decrease tremors:    Avoid alcohol, cigarettes, and caffeine    Avoid stressful situations as much as possible    Use relaxation techniques, such as yoga, deep-breathing exercises, or biofeedback    Check with your health care provider to see if any medications you re taking could be making your tremors worse.  Talk with your health care provider about other options, such as surgery, if ET starts to affect your quality of life.  When should I call my health care provider?  If you have been diagnosed with ET, talk with your health care provider about when you might need to call. He or she will likely advise you to call if your tremors become worse, or if you develop new neurologic symptoms, such as numbness or weakness.  Key points    ET is a neurological disorder that causes your hands, head, trunk, voice, or legs to shake rhythmically. The cause is not known, but it is often passed down from a parent to a child.    ET is sometimes confused with other types of tremor, so getting the right diagnosis is important.    Tremors tend to be worse during movement than when at rest. The tremors are usually not dangerous, but they can get worse over time.    Avoiding things that might make tremors worse, such as stress, caffeine, and certain medications, may be helpful. Medicines can also help control or limit tremors in some people. Severe tremors can sometimes be treated with surgery.  Next steps  Tips to help you get the most from a visit to your health care provider:    Before your visit,  write down questions you want answered.    Bring someone with you to help you ask questions and remember what your provider tells you.    At the visit, write down the names of new medicines, treatments, or tests, and any new instructions your provider gives you.    If you have a follow-up appointment, write down the date, time, and purpose for that visit.    Know how you can contact your provider if you have questions.    4873-0350 [employer]. [address] [place] [address], [place] [address]. All rights reserved. This information is not intended as a substitute for professional medical care. Always follow your healthcare professional's instructions.

## 2018-10-04 NOTE — PROGRESS NOTES
SUBJECTIVE:   Moira Andre is a 85 year old female who presents to clinic today for the following health issues:    Depression Followup    Status since last visit: same as always been     See PHQ-9 for current symptoms.  Other associated symptoms: None    Complicating factors:   Significant life event:  No   Current substance abuse:  None  Anxiety or Panic symptoms:  No    PHQ 8/15/2017 4/4/2018 8/31/2018   PHQ-9 Total Score 4 7 10   Q9: Suicide Ideation Not at all Not at all Not at all     PHQ-9  English  PHQ-9   Any Language  Suicide Assessment Five-step Evaluation and Treatment (SAFE-T)    Amount of exercise or physical activity: None    Problems taking medications regularly: No    Medication side effects: none    Diet: regular (no restrictions)    Diabetes follow up  Has stopped metformin  Has high levels of lactic acid  Checks BS levels at home  It's mainly controlled  However, eating late increases her BS levels    Hypertension follow up   Reports she has been taking lisinopril 10 mg daily  Hasn't been checking BP at home    Tremors  Patient reports tremors  She has trouble holding things and moving them  She is worried that she has Parkinsonism    Follow up on OV 8/31/18--patient state that she is doing better but she has not seen a urologist as you suggested.      Problem list and histories reviewed & adjusted, as indicated.  Additional history: as documented    Medications and Labs reviewed in EPIC    Reviewed and updated as needed this visit by clinical staff  Tobacco  Allergies  Meds       Reviewed and updated as needed this visit by Provider       ROS:  Constitutional, HEENT, cardiovascular, pulmonary, GI, , musculoskeletal, neuro, skin, endocrine and psych systems are negative, except as otherwise noted.    POSITIVE for tremors    This document serves as a record of the services and decisions personally performed and made by Maryan Churchill MD. It was created on her behalf by juan Landon  trained medical scribe. The creation of this document is based on the provider's statements to the medical scribe.  Jeanette Leland 10:17 AM October 4, 2018    OBJECTIVE:     /78 (BP Location: Right arm, Patient Position: Sitting, Cuff Size: Adult Large)  Pulse 88  Temp 97.9  F (36.6  C) (Oral)  Resp 20  Wt 128.4 kg (283 lb)  SpO2 94%  Breastfeeding? No  BMI 44.31 kg/m2  Body mass index is 44.31 kg/(m^2).     GENERAL: morbidly obese, alert and no distress  PSYCH: mentation appears normal, affect normal/bright  Walks with help of walker   Tremors are present.    Diagnostic Test Results:  none     Reviewed and discussed labs done on 09/04/18  Reviewed and discussed labs done on 08/24/18  ASSESSMENT/PLAN:     Moira was seen today for recheck.    Diagnoses and all orders for this visit:    Essential hypertension  BP was elevated in office visit today  She hasn't been checking it at home  Had decreased dose of lisinopril to 10 mg daily as was on nifedipine for kidney stone  I increased her dose to 20 mg daily again as BP on higher side  Advised her to monitor BP at home and report readings above 140/90    Type 2 diabetes mellitus with diabetic nephropathy, without long-term current use of insulin (H)  -     glimepiride (AMARYL) 2 MG tablet; Take 1 tablet (2 mg) by mouth every morning (before breakfast)  Has stopped metformin due to consistently increase lactic acid  However, BS levels have been variable  Last a1c was also high  Prescribed glimepiride as other newer medication will be very expesive  Advised her to watch out for symptoms of hypoglycemia  Lab Results   Component Value Date    A1C 7.6 07/04/2018    A1C 7.2 04/03/2018    A1C 6.6 08/15/2017    A1C 6.7 04/11/2017    A1C 6.7 12/16/2016     CKD (chronic kidney disease) stage 3, GFR 30-59 ml/min (H)  Stable    She has history of kidney stone  Advised for XR to check if stone has passed.  She declined  No symptoms currently.      Morbid obesity  (H)  Advised healthy eating and exercise habits    Ventral hernia without obstruction or gangrene  Explained to patient that hernia could get obstructed  She is concerned about surgery  Advised her to make appointment with surgery  Explained that due to size of hernia, surgery is recommended    Moderate major depression (H)  Doing well  Compliant with medication  Well dary has made a difference.    Tremor  Explained to patient that she has essential tremors  Educated her that tremors associated with Parkinsonism happen at rest  She does not have other features but will continue to monitor.  She doesn't want to be medicated as she is already on many medications  Information about essential tremors given to patient in office visit today      Patient had many questions about her medications, all of which were answered    Patient Instructions   Start Amaryl 2 mg every morning  Watch out for symptoms of hypoglycemia  Let me know how it works for you  Take lisinopril 20 mg daily instead of 10 mg  Monitor your blood pressure once a week at home.  Bring those readings on your next visit.  Notify us if your blood pressure readings consistently stays greater than 140/90.  Schedule an appointment for general surgery at your earliest convenience  Follow up in 3 months  Seek sooner medical attention if there is any worsening of symptoms or problems    The information in this document, created by the medical scribe for me, accurately reflects the services I personally performed and the decisions made by me. I have reviewed and approved this document for accuracy prior to leaving the patient care area.  October 4, 2018 10:35 AM    Maryan Churchill MD  [facility]

## 2018-10-04 NOTE — MR AVS SNAPSHOT
After Visit Summary   10/4/2018    Moira Andre    MRN: [id number]           Patient Information     Date Of Birth          9/24/1933        Visit Information        Provider Department      10/4/2018 10:00 AM Maryan Churchill MD [facility]        Today's Diagnoses     Essential hypertension    -  1    Type 2 diabetes mellitus with diabetic nephropathy, without long-term current use of insulin (H)        CKD (chronic kidney disease) stage 3, GFR 30-59 ml/min (H)        Morbid obesity (H)        Ventral hernia without obstruction or gangrene        Moderate major depression (H)        Tremor          Care Instructions    Start Amaryl 2 mg every morning  Watch out for symptoms of hypoglycemia  Let me know how it works for you  Take lisinopril 20 mg daily instead of 10 mg  Monitor your blood pressure once a week at home.  Bring those readings on your next visit.  Notify us if your blood pressure readings consistently stays greater than 140/90.  Schedule an appointment for general surgery at your earliest convenience  Follow up in 3 months  Seek sooner medical attention if there is any worsening of symptoms or problems  Essential Tremor (ET)  What is essential tremor?  Essential tremor (ET) is a neurological disorder that causes your hands, head, trunk, voice, and/or legs to shake rhythmically. It is often confused with Parkinson disease.  ET is the most common trembling disorder that people experience. Everyone has some ET, but the movements usually cannot be seen or felt. When tremors are noticeable, the condition is classified as ET.  ET is most common among people older than age 65, but it can affect people at any age.  What causes ET?  ET can occur in different people for different reasons:    Familial essential tremor. In most people, the condition seems to be passed down from a parent to a child. If your parent has ET, there is a 50% chance that you or your children will inherit the  gene responsible for the condition.    Essential tremor related to another disorder. Sometimes, a tremor is a symptom of another neurological disorder, such as Parkinson disease or dystonia. Sometimes, ET is mistaken for these other diseases when they are not present. A health care provider s careful diagnosis is extremely important.  The cause of ET isn t known. However, one theory suggests that your cerebellum and other parts of your brain are not communicating correctly. The cerebellum is a part of the brain that controls muscle coordination.  What are the symptoms of ET?  If you have ET, you will experience shaking and trembling at different times and in different situations, but some characteristics are common to all. Here is what you might typically experience:    Tremors occur when you move and are less noticeable when you rest.    Certain medications, caffeine, or stress can make your tremors worse.    Tremors get worse as you age.    Tremors don t affect both sides of your body in the same way.  Here are different signs of essential tremor:    Tremors that are most obvious in your hands    Difficulty performing tasks with your hands, such as writing or using tools    Shaking or quivering sound in your voice    Uncontrollable head-nodding    In rare instances, tremors in your legs or feet  How is ET diagnosed?  Your rapid, uncontrollable trembling, as well as questions about your medical and family history, can help your health care provider determine if you have familial ET. He or she will probably need to rule out other conditions that could cause shaking or trembling. For example, tremors could be symptoms of diseases, such as hyperthyroidism. Your health care provider might test you for those, as well.  In some cases, the tremors might be related to other factors. To find out for certain, your health care provider may have you try to:    Abstain from alcohol; if you re an alcoholic, trembling is a common  symptom.    Cut out cigarette smoking.    Avoid caffeine.    Avoid certain medications  How is ET treated?   Propanolol and primidone are 2 medications often prescribed to treat ET. Propanolol blocks the stimulating action of neurotransmitters to calm your trembling. Primidone is a common antiseizure medication that also controls the actions of neurotransmitters.  Gabapentin and topiramate are 2 other antiseizure medications that are sometimes prescribed. In some cases, tranquilizers like alprazolam or clonazepam might be suggested.  For ET in your hands, botulinum toxin (Botox) injections have shown some promise in easing the trembling. They work by weakening the surrounding muscles around your hands. For severe tremors, a stimulating device (deep brain stimulator) surgically implanted in your brain may help.  Can ET be prevented?   The specific cause of ET is not known, so scientists are not sure how the condition can be prevented.  Living with ET  ET is usually not dangerous, but it can certainly be frustrating if you have to deal with it. Certain factors can make tremors worse, so the following steps may help to decrease tremors:    Avoid alcohol, cigarettes, and caffeine    Avoid stressful situations as much as possible    Use relaxation techniques, such as yoga, deep-breathing exercises, or biofeedback    Check with your health care provider to see if any medications you re taking could be making your tremors worse.  Talk with your health care provider about other options, such as surgery, if ET starts to affect your quality of life.  When should I call my health care provider?  If you have been diagnosed with ET, talk with your health care provider about when you might need to call. He or she will likely advise you to call if your tremors become worse, or if you develop new neurologic symptoms, such as numbness or weakness.  Key points    ET is a neurological disorder that causes your hands, head, trunk,  voice, or legs to shake rhythmically. The cause is not known, but it is often passed down from a parent to a child.    ET is sometimes confused with other types of tremor, so getting the right diagnosis is important.    Tremors tend to be worse during movement than when at rest. The tremors are usually not dangerous, but they can get worse over time.    Avoiding things that might make tremors worse, such as stress, caffeine, and certain medications, may be helpful. Medicines can also help control or limit tremors in some people. Severe tremors can sometimes be treated with surgery.  Next steps  Tips to help you get the most from a visit to your health care provider:    Before your visit, write down questions you want answered.    Bring someone with you to help you ask questions and remember what your provider tells you.    At the visit, write down the names of new medicines, treatments, or tests, and any new instructions your provider gives you.    If you have a follow-up appointment, write down the date, time, and purpose for that visit.    Know how you can contact your provider if you have questions.  Essential Tremor (ET)  What is essential tremor?  Essential tremor (ET) is a neurological disorder that causes your hands, head, trunk, voice, and/or legs to shake rhythmically. It is often confused with Parkinson disease.  ET is the most common trembling disorder that people experience. Everyone has some ET, but the movements usually cannot be seen or felt. When tremors are noticeable, the condition is classified as ET.  ET is most common among people older than age 65, but it can affect people at any age.  What causes ET?  ET can occur in different people for different reasons:    Familial essential tremor. In most people, the condition seems to be passed down from a parent to a child. If your parent has ET, there is a 50% chance that you or your children will inherit the gene responsible for the  condition.    Essential tremor related to another disorder. Sometimes, a tremor is a symptom of another neurological disorder, such as Parkinson disease or dystonia. Sometimes, ET is mistaken for these other diseases when they are not present. A health care provider s careful diagnosis is extremely important.  The cause of ET isn t known. However, one theory suggests that your cerebellum and other parts of your brain are not communicating correctly. The cerebellum is a part of the brain that controls muscle coordination.  What are the symptoms of ET?  If you have ET, you will experience shaking and trembling at different times and in different situations, but some characteristics are common to all. Here is what you might typically experience:    Tremors occur when you move and are less noticeable when you rest.    Certain medications, caffeine, or stress can make your tremors worse.    Tremors get worse as you age.    Tremors don t affect both sides of your body in the same way.  Here are different signs of essential tremor:    Tremors that are most obvious in your hands    Difficulty performing tasks with your hands, such as writing or using tools    Shaking or quivering sound in your voice    Uncontrollable head-nodding    In rare instances, tremors in your legs or feet  How is ET diagnosed?  Your rapid, uncontrollable trembling, as well as questions about your medical and family history, can help your health care provider determine if you have familial ET. He or she will probably need to rule out other conditions that could cause shaking or trembling. For example, tremors could be symptoms of diseases, such as hyperthyroidism. Your health care provider might test you for those, as well.  In some cases, the tremors might be related to other factors. To find out for certain, your health care provider may have you try to:    Abstain from alcohol; if you re an alcoholic, trembling is a common symptom.    Cut out  cigarette smoking.    Avoid caffeine.    Avoid certain medications  How is ET treated?   Propanolol and primidone are 2 medications often prescribed to treat ET. Propanolol blocks the stimulating action of neurotransmitters to calm your trembling. Primidone is a common antiseizure medication that also controls the actions of neurotransmitters.  Gabapentin and topiramate are 2 other antiseizure medications that are sometimes prescribed. In some cases, tranquilizers like alprazolam or clonazepam might be suggested.  For ET in your hands, botulinum toxin (Botox) injections have shown some promise in easing the trembling. They work by weakening the surrounding muscles around your hands. For severe tremors, a stimulating device (deep brain stimulator) surgically implanted in your brain may help.  Can ET be prevented?   The specific cause of ET is not known, so scientists are not sure how the condition can be prevented.  Living with ET  ET is usually not dangerous, but it can certainly be frustrating if you have to deal with it. Certain factors can make tremors worse, so the following steps may help to decrease tremors:    Avoid alcohol, cigarettes, and caffeine    Avoid stressful situations as much as possible    Use relaxation techniques, such as yoga, deep-breathing exercises, or biofeedback    Check with your health care provider to see if any medications you re taking could be making your tremors worse.  Talk with your health care provider about other options, such as surgery, if ET starts to affect your quality of life.  When should I call my health care provider?  If you have been diagnosed with ET, talk with your health care provider about when you might need to call. He or she will likely advise you to call if your tremors become worse, or if you develop new neurologic symptoms, such as numbness or weakness.  Key points    ET is a neurological disorder that causes your hands, head, trunk, voice, or legs to shake  rhythmically. The cause is not known, but it is often passed down from a parent to a child.    ET is sometimes confused with other types of tremor, so getting the right diagnosis is important.    Tremors tend to be worse during movement than when at rest. The tremors are usually not dangerous, but they can get worse over time.    Avoiding things that might make tremors worse, such as stress, caffeine, and certain medications, may be helpful. Medicines can also help control or limit tremors in some people. Severe tremors can sometimes be treated with surgery.  Next steps  Tips to help you get the most from a visit to your health care provider:    Before your visit, write down questions you want answered.    Bring someone with you to help you ask questions and remember what your provider tells you.    At the visit, write down the names of new medicines, treatments, or tests, and any new instructions your provider gives you.    If you have a follow-up appointment, write down the date, time, and purpose for that visit.    Know how you can contact your provider if you have questions.    7455-0118 [employer]. [address] [place] [address]. All rights reserved. This information is not intended as a substitute for professional medical care. Always follow your healthcare professional's instructions.                Follow-ups after your visit        Follow-up notes from your care team     Return in about 3 months (around 1/4/2019) for hypertension, diabetes.      Your next 10 appointments already scheduled     Dec 03, 2018  9:00 AM CST   [facility] MD   [facility]    [address] [facility] [address] [phone]           Bring a current list of meds and any records pertaining to this visit. For Physicals, please bring immunization records and any forms needing to be filled out. Please arrive 10 minutes early to complete  paperwork.              Who to contact     If you have questions or need follow up information about today's clinic visit or your schedule please contact [facility] directly at [phone].  Normal or non-critical lab and imaging results will be communicated to you by MyChart, letter or phone within 4 business days after the clinic has received the results. If you do not hear from us within 7 days, please contact the clinic through MyChart or phone. If you have a critical or abnormal lab result, we will notify you by phone as soon as possible.  Submit refill requests through Rep or call your pharmacy and they will forward the refill request to us. Please allow 3 business days for your refill to be completed.          Additional Information About Your Visit        Care EveryWhere ID     This is your Care EveryWhere ID. This could be used by other organizations to access your [facility] medical records  [id number]        Your Vitals Were     Pulse Temperature Respirations Pulse Oximetry Breastfeeding? BMI (Body Mass Index)    88 97.9  F (36.6  C) (Oral) 20 94% No 44.31 kg/m2       Blood Pressure from Last 3 Encounters:   10/04/18 148/78   09/17/18 124/72   08/31/18 140/80    Weight from Last 3 Encounters:   10/04/18 283 lb (128.4 kg)   09/17/18 280 lb (127 kg)   08/31/18 285 lb (129.3 kg)              Today, you had the following     No orders found for display         Today's Medication Changes          These changes are accurate as of 10/4/18 10:33 AM.  If you have any questions, ask your nurse or doctor.               Start taking these medicines.        Dose/Directions    glimepiride 2 MG tablet   Commonly known as:  AMARYL   Used for:  Type 2 diabetes mellitus with diabetic nephropathy, without long-term current use of insulin (H)   Started by:  Maryan Churchill MD        Dose:  2 mg   Take 1 tablet (2 mg) by mouth every morning (before breakfast)   Quantity:  90 tablet   Refills:  1          These medicines have changed or have updated prescriptions.        Dose/Directions    neomycin-polymyxin-hydrocortisone otic solution   Commonly known as:  CORTISPORIN   This may have changed:    - when to take this  - reasons to take this   Used for:  Left ear pain        Dose:  4 drop   Place 4 drops into both ears 4 times daily   Quantity:  10 mL   Refills:  0       nystatin-triamcinolone cream   Commonly known as:  MYCOLOG II   This may have changed:  See the new instructions.   Used for:  Tinea of the body        APPLY TOPICALLY TWICE DAILY   Quantity:  60 g   Refills:  1            Where to get your medicines      These medications were sent to [facility] - [place], [place] - [address]  [address] [facility] [address]     Phone:  [phone]     glimepiride 2 MG tablet                Primary Care Provider Office Phone # Fax #    Maryan BRIGIDA Churchill [phone] [address] [facility] [address] [facility] [address]  [facility] [address]        Equal Access to Services     [facility]: Hadii gissel ku hadasho Somakayla, waaxda luqadaha, qaybta kaalmada adeegyada, waxay fernandoin richardn kevin shaw . So [facility] [phone].    ATENCIÓN: Si shan hogan, tiene a gold disposición servicios gratuitos de asistencia lingüística. Llame al [phone].    We comply with applicable federal civil rights laws and [place] laws. We do not discriminate on the basis of race, color, national origin, age, disability, sex, sexual orientation, or gender identity.            Thank you!     Thank you for choosing [facility]  for your care. Our goal is always to provide you with excellent care. Hearing back from our patients is one way we can continue to improve our services. Please take a few minutes to complete the written survey that you may receive in the mail after your visit with us. Thank you!             Your Updated Medication List - Protect others around you: Learn how to  safely use, store and throw away your medicines at www.disposemymeds.org.          This list is accurate as of 10/4/18 10:33 AM.  Always use your most recent med list.                   Brand Name Dispense Instructions for use Diagnosis    acetaminophen 325 MG tablet    TYLENOL     Take 325-650 mg by mouth every 6 hours as needed for mild pain        ADVAIR DISKUS 100-50 MCG/DOSE diskus inhaler   Generic drug:  fluticasone-salmeterol     3 Inhaler    INHALE 1 PUFF EVERY 12 HOURS    Intermittent asthma, uncomplicated       albuterol 108 (90 Base) MCG/ACT inhaler    PROAIR HFA/PROVENTIL HFA/VENTOLIN HFA    1 Inhaler    Inhale 2 puffs into the lungs every 6 hours as needed for shortness of breath / dyspnea    Intermittent asthma, uncomplicated       atorvastatin 20 MG tablet    LIPITOR    90 tablet    Take 1 tablet (20 mg) by mouth daily    Hyperlipidemia LDL goal <70       * blood glucose monitoring meter device kit    no brand specified    1 kit    Use to test blood sugar 1-2 times daily or as directed.    Type 2 diabetes mellitus with diabetic nephropathy (H)       * TRUE METRIX AIR GLUCOSE METER w/Device Kit     1 kit    USE AS DIRECTED    Type 2 diabetes mellitus with diabetic nephropathy (H)       blood glucose monitoring test strip    TRUE METRIX BLOOD GLUCOSE TEST    200 strip    1 strip by In Vitro route daily    Type 2 diabetes mellitus with diabetic nephropathy, without long-term current use of insulin (H)       buPROPion 100 MG 12 hr tablet    WELLBUTRIN SR    60 tablet    Take 1 tablet (100 mg) by mouth 2 times daily    Moderate episode of recurrent major depressive disorder (H)       clopidogrel 75 MG tablet    PLAVIX    90 tablet    Take 1 tablet (75 mg) by mouth daily    Coronary artery disease involving native coronary artery of native heart without angina pectoris       DULoxetine 60 MG EC capsule    CYMBALTA    90 capsule    Take 1 capsule (60 mg) by mouth daily    Moderate major depression (H)        FLONASE 50 MCG/ACT spray   Generic drug:  fluticasone      Spray 1 spray into both nostrils as needed        furosemide 20 MG tablet    LASIX    15 tablet    Take 1 tablet (20 mg) by mouth daily as needed    Edema of lower extremity       glimepiride 2 MG tablet    AMARYL    90 tablet    Take 1 tablet (2 mg) by mouth every morning (before breakfast)    Type 2 diabetes mellitus with diabetic nephropathy, without long-term current use of insulin (H)       HYDROcodone-acetaminophen 5-325 MG per tablet    NORCO    90 tablet    Take 1 tablet by mouth every 8 hours as needed for moderate to severe pain    Multiple joint pain       IBANdronate 150 MG tablet    BONIVA     Take 150 mg by mouth every 30 days Patient takes on the first day of each month.        ICY HOT EX      Apply to affected area every morning        lisinopril 20 MG tablet    PRINIVIL/ZESTRIL     Take 1 tablet (20 mg) by mouth daily    Essential hypertension       neomycin-polymyxin-hydrocortisone otic solution    CORTISPORIN    10 mL    Place 4 drops into both ears 4 times daily    Left ear pain       NIFEdipine ER 30 MG Tb24    ADALAT CC    14 tablet    Take 1 tablet (30 mg) by mouth daily    Ureteral stone       NITROQUICK SL      Place 0.4 mg under the tongue every 5 minutes as needed        nystatin-triamcinolone cream    MYCOLOG II    60 g    APPLY TOPICALLY TWICE DAILY    Tinea of the body       order for DME      DREAMSTATION 5-15 CM/H20 NASAL WISP FABRIC        ranitidine 150 MG tablet    ZANTAC    60 tablet    Take 1 tablet (150 mg) by mouth 2 times daily    Dry cough       TRUEPLUS LANCETS 28G Misc     100 each    1 each 2 times daily as needed    Type 2 diabetes mellitus with diabetic nephropathy (H)       vitamin D 2000 units Caps      Take by mouth daily        * Notice:  This list has 2 medication(s) that are the same as other medications prescribed for you. Read the directions carefully, and ask your doctor or other care provider to review  them with you.

## 2018-10-05 ASSESSMENT — PATIENT HEALTH QUESTIONNAIRE - PHQ9: SUM OF ALL RESPONSES TO PHQ QUESTIONS 1-9: 3

## 2018-10-31 ENCOUNTER — OFFICE VISIT (OUTPATIENT)
Dept: URGENT CARE | Facility: URGENT CARE | Age: 83
End: 2018-10-31
Payer: COMMERCIAL

## 2018-10-31 ENCOUNTER — APPOINTMENT (OUTPATIENT)
Dept: GENERAL RADIOLOGY | Facility: CLINIC | Age: 83
DRG: 313 | End: 2018-10-31
Attending: EMERGENCY MEDICINE
Payer: COMMERCIAL

## 2018-10-31 ENCOUNTER — TRANSFERRED RECORDS (OUTPATIENT)
Dept: HEALTH INFORMATION MANAGEMENT | Facility: CLINIC | Age: 83
End: 2018-10-31

## 2018-10-31 ENCOUNTER — HOSPITAL ENCOUNTER (OUTPATIENT)
Facility: CLINIC | Age: 83
Setting detail: OBSERVATION
Discharge: HOME | DRG: 313 | End: 2018-11-02
Attending: EMERGENCY MEDICINE | Admitting: INTERNAL MEDICINE
Payer: COMMERCIAL

## 2018-10-31 ENCOUNTER — APPOINTMENT (OUTPATIENT)
Dept: CT IMAGING | Facility: CLINIC | Age: 83
DRG: 313 | End: 2018-10-31
Attending: INTERNAL MEDICINE
Payer: COMMERCIAL

## 2018-10-31 VITALS
OXYGEN SATURATION: 94 % | TEMPERATURE: 97.8 F | SYSTOLIC BLOOD PRESSURE: 187 MMHG | HEART RATE: 112 BPM | DIASTOLIC BLOOD PRESSURE: 99 MMHG

## 2018-10-31 DIAGNOSIS — R07.9 CHEST PAIN, UNSPECIFIED TYPE: Primary | ICD-10-CM

## 2018-10-31 DIAGNOSIS — R07.9 CHEST PAIN, UNSPECIFIED TYPE: ICD-10-CM

## 2018-10-31 DIAGNOSIS — E11.21 TYPE 2 DIABETES MELLITUS WITH DIABETIC NEPHROPATHY, WITHOUT LONG-TERM CURRENT USE OF INSULIN (H): ICD-10-CM

## 2018-10-31 DIAGNOSIS — Z86.79 HISTORY OF CORONARY ARTERY DISEASE: ICD-10-CM

## 2018-10-31 DIAGNOSIS — I10 ESSENTIAL HYPERTENSION: ICD-10-CM

## 2018-10-31 PROBLEM — R07.89 ATYPICAL CHEST PAIN: Status: ACTIVE | Noted: 2018-10-31

## 2018-10-31 PROBLEM — I24.9 ACS (ACUTE CORONARY SYNDROME) (H): Status: ACTIVE | Noted: 2018-10-31

## 2018-10-31 LAB
ALBUMIN SERPL-MCNC: 3.5 G/DL (ref 3.4–5)
ALP SERPL-CCNC: 64 U/L (ref 40–150)
ALT SERPL W P-5'-P-CCNC: 16 U/L (ref 0–50)
ANION GAP SERPL CALCULATED.3IONS-SCNC: 9 MMOL/L (ref 3–14)
AST SERPL W P-5'-P-CCNC: 11 U/L (ref 0–45)
BASOPHILS # BLD AUTO: 0 10E9/L (ref 0–0.2)
BASOPHILS NFR BLD AUTO: 0.2 %
BILIRUB SERPL-MCNC: 0.3 MG/DL (ref 0.2–1.3)
BUN SERPL-MCNC: 21 MG/DL (ref 7–30)
CALCIUM SERPL-MCNC: 8.8 MG/DL (ref 8.5–10.1)
CHLORIDE SERPL-SCNC: 106 MMOL/L (ref 94–109)
CO2 SERPL-SCNC: 23 MMOL/L (ref 20–32)
CREAT SERPL-MCNC: 1.32 MG/DL (ref 0.52–1.04)
D DIMER PPP FEU-MCNC: 0.7 UG/ML FEU (ref 0–0.5)
DIFFERENTIAL METHOD BLD: NORMAL
EOSINOPHIL # BLD AUTO: 0.1 10E9/L (ref 0–0.7)
EOSINOPHIL NFR BLD AUTO: 1 %
ERYTHROCYTE [DISTWIDTH] IN BLOOD BY AUTOMATED COUNT: 14.6 % (ref 10–15)
ERYTHROCYTE [DISTWIDTH] IN BLOOD BY AUTOMATED COUNT: 14.6 % (ref 10–15)
GFR SERPL CREATININE-BSD FRML MDRD: 38 ML/MIN/1.7M2
GLUCOSE BLDC GLUCOMTR-MCNC: 135 MG/DL (ref 70–99)
GLUCOSE SERPL-MCNC: 136 MG/DL (ref 70–99)
HBA1C MFR BLD: 7 % (ref 0–5.6)
HCT VFR BLD AUTO: 38.6 % (ref 35–47)
HCT VFR BLD AUTO: 38.7 % (ref 35–47)
HGB BLD-MCNC: 12.1 G/DL (ref 11.7–15.7)
HGB BLD-MCNC: 12.2 G/DL (ref 11.7–15.7)
IMM GRANULOCYTES # BLD: 0 10E9/L (ref 0–0.4)
IMM GRANULOCYTES NFR BLD: 0.2 %
INTERPRETATION ECG - MUSE: NORMAL
LIPASE SERPL-CCNC: 203 U/L (ref 73–393)
LYMPHOCYTES # BLD AUTO: 1.6 10E9/L (ref 0.8–5.3)
LYMPHOCYTES NFR BLD AUTO: 14.8 %
MCH RBC QN AUTO: 27 PG (ref 26.5–33)
MCH RBC QN AUTO: 27.1 PG (ref 26.5–33)
MCHC RBC AUTO-ENTMCNC: 31.3 G/DL (ref 31.5–36.5)
MCHC RBC AUTO-ENTMCNC: 31.6 G/DL (ref 31.5–36.5)
MCV RBC AUTO: 86 FL (ref 78–100)
MCV RBC AUTO: 86 FL (ref 78–100)
MONOCYTES # BLD AUTO: 0.8 10E9/L (ref 0–1.3)
MONOCYTES NFR BLD AUTO: 7.5 %
NEUTROPHILS # BLD AUTO: 8.1 10E9/L (ref 1.6–8.3)
NEUTROPHILS NFR BLD AUTO: 76.3 %
NRBC # BLD AUTO: 0 10*3/UL
NRBC BLD AUTO-RTO: 0 /100
PLATELET # BLD AUTO: 270 10E9/L (ref 150–450)
PLATELET # BLD AUTO: 271 10E9/L (ref 150–450)
POTASSIUM SERPL-SCNC: 4.2 MMOL/L (ref 3.4–5.3)
PROT SERPL-MCNC: 7.5 G/DL (ref 6.8–8.8)
RBC # BLD AUTO: 4.48 10E12/L (ref 3.8–5.2)
RBC # BLD AUTO: 4.5 10E12/L (ref 3.8–5.2)
SODIUM SERPL-SCNC: 138 MMOL/L (ref 133–144)
TROPONIN I SERPL-MCNC: <0.015 UG/L (ref 0–0.04)
TROPONIN I SERPL-MCNC: <0.015 UG/L (ref 0–0.04)
WBC # BLD AUTO: 10.6 10E9/L (ref 4–11)
WBC # BLD AUTO: 11.7 10E9/L (ref 4–11)

## 2018-10-31 PROCEDURE — 25000125 ZZHC RX 250: Performed by: INTERNAL MEDICINE

## 2018-10-31 PROCEDURE — 71260 CT THORAX DX C+: CPT

## 2018-10-31 PROCEDURE — 99223 1ST HOSP IP/OBS HIGH 75: CPT | Mod: AI | Performed by: INTERNAL MEDICINE

## 2018-10-31 PROCEDURE — 96374 THER/PROPH/DIAG INJ IV PUSH: CPT

## 2018-10-31 PROCEDURE — 25000125 ZZHC RX 250: Performed by: EMERGENCY MEDICINE

## 2018-10-31 PROCEDURE — 99207 ZZC OFFICE-HOSPITAL ADMIT: CPT | Performed by: FAMILY MEDICINE

## 2018-10-31 PROCEDURE — 96375 TX/PRO/DX INJ NEW DRUG ADDON: CPT | Mod: 59

## 2018-10-31 PROCEDURE — 25000132 ZZH RX MED GY IP 250 OP 250 PS 637: Performed by: INTERNAL MEDICINE

## 2018-10-31 PROCEDURE — 93005 ELECTROCARDIOGRAM TRACING: CPT

## 2018-10-31 PROCEDURE — G0378 HOSPITAL OBSERVATION PER HR: HCPCS

## 2018-10-31 PROCEDURE — 85379 FIBRIN DEGRADATION QUANT: CPT | Performed by: EMERGENCY MEDICINE

## 2018-10-31 PROCEDURE — 25000128 H RX IP 250 OP 636: Performed by: INTERNAL MEDICINE

## 2018-10-31 PROCEDURE — 99285 EMERGENCY DEPT VISIT HI MDM: CPT | Mod: 25

## 2018-10-31 PROCEDURE — 83036 HEMOGLOBIN GLYCOSYLATED A1C: CPT | Performed by: INTERNAL MEDICINE

## 2018-10-31 PROCEDURE — 25000132 ZZH RX MED GY IP 250 OP 250 PS 637: Performed by: EMERGENCY MEDICINE

## 2018-10-31 PROCEDURE — 84484 ASSAY OF TROPONIN QUANT: CPT | Performed by: INTERNAL MEDICINE

## 2018-10-31 PROCEDURE — 84484 ASSAY OF TROPONIN QUANT: CPT | Performed by: EMERGENCY MEDICINE

## 2018-10-31 PROCEDURE — 71046 X-RAY EXAM CHEST 2 VIEWS: CPT

## 2018-10-31 PROCEDURE — 00000146 ZZHCL STATISTIC GLUCOSE BY METER IP

## 2018-10-31 PROCEDURE — 36415 COLL VENOUS BLD VENIPUNCTURE: CPT | Performed by: INTERNAL MEDICINE

## 2018-10-31 PROCEDURE — 85025 COMPLETE CBC W/AUTO DIFF WBC: CPT | Performed by: EMERGENCY MEDICINE

## 2018-10-31 PROCEDURE — 25000128 H RX IP 250 OP 636: Performed by: EMERGENCY MEDICINE

## 2018-10-31 PROCEDURE — 85049 AUTOMATED PLATELET COUNT: CPT | Performed by: INTERNAL MEDICINE

## 2018-10-31 PROCEDURE — 85027 COMPLETE CBC AUTOMATED: CPT | Mod: 91 | Performed by: INTERNAL MEDICINE

## 2018-10-31 PROCEDURE — C9113 INJ PANTOPRAZOLE SODIUM, VIA: HCPCS | Performed by: INTERNAL MEDICINE

## 2018-10-31 PROCEDURE — 21000001 ZZH R&B HEART CARE

## 2018-10-31 PROCEDURE — 93000 ELECTROCARDIOGRAM COMPLETE: CPT | Performed by: FAMILY MEDICINE

## 2018-10-31 PROCEDURE — 83690 ASSAY OF LIPASE: CPT | Performed by: EMERGENCY MEDICINE

## 2018-10-31 PROCEDURE — 80053 COMPREHEN METABOLIC PANEL: CPT | Performed by: EMERGENCY MEDICINE

## 2018-10-31 RX ORDER — METOPROLOL SUCCINATE 100 MG/1
100 TABLET, EXTENDED RELEASE ORAL DAILY
COMMUNITY
End: 2019-08-26

## 2018-10-31 RX ORDER — ASPIRIN 81 MG/1
324 TABLET, CHEWABLE ORAL ONCE
Status: DISCONTINUED | OUTPATIENT
Start: 2018-10-31 | End: 2018-10-31

## 2018-10-31 RX ORDER — BUPROPION HYDROCHLORIDE 100 MG/1
100 TABLET, EXTENDED RELEASE ORAL 2 TIMES DAILY
Status: DISCONTINUED | OUTPATIENT
Start: 2018-10-31 | End: 2018-11-02 | Stop reason: HOSPADM

## 2018-10-31 RX ORDER — HYDROCODONE BITARTRATE AND ACETAMINOPHEN 5; 325 MG/1; MG/1
1-2 TABLET ORAL EVERY 4 HOURS PRN
Status: DISCONTINUED | OUTPATIENT
Start: 2018-10-31 | End: 2018-11-02 | Stop reason: HOSPADM

## 2018-10-31 RX ORDER — NICOTINE POLACRILEX 4 MG
15-30 LOZENGE BUCCAL
Status: DISCONTINUED | OUTPATIENT
Start: 2018-10-31 | End: 2018-11-02 | Stop reason: HOSPADM

## 2018-10-31 RX ORDER — NIFEDIPINE 30 MG/1
30 TABLET, EXTENDED RELEASE ORAL DAILY
Status: DISCONTINUED | OUTPATIENT
Start: 2018-11-01 | End: 2018-11-02 | Stop reason: HOSPADM

## 2018-10-31 RX ORDER — HEPARIN SODIUM 5000 [USP'U]/.5ML
5000 INJECTION, SOLUTION INTRAVENOUS; SUBCUTANEOUS EVERY 12 HOURS
Status: DISCONTINUED | OUTPATIENT
Start: 2018-10-31 | End: 2018-10-31

## 2018-10-31 RX ORDER — FLUTICASONE PROPIONATE 50 MCG
1 SPRAY, SUSPENSION (ML) NASAL DAILY PRN
Status: DISCONTINUED | OUTPATIENT
Start: 2018-10-31 | End: 2018-11-02 | Stop reason: HOSPADM

## 2018-10-31 RX ORDER — MORPHINE SULFATE 4 MG/ML
4 INJECTION, SOLUTION INTRAMUSCULAR; INTRAVENOUS ONCE
Status: COMPLETED | OUTPATIENT
Start: 2018-10-31 | End: 2018-10-31

## 2018-10-31 RX ORDER — PROCHLORPERAZINE MALEATE 5 MG
5 TABLET ORAL EVERY 6 HOURS PRN
Status: DISCONTINUED | OUTPATIENT
Start: 2018-10-31 | End: 2018-11-02 | Stop reason: HOSPADM

## 2018-10-31 RX ORDER — ACETAMINOPHEN 650 MG/1
650 SUPPOSITORY RECTAL EVERY 4 HOURS PRN
Status: DISCONTINUED | OUTPATIENT
Start: 2018-10-31 | End: 2018-11-02 | Stop reason: HOSPADM

## 2018-10-31 RX ORDER — ATORVASTATIN CALCIUM 20 MG/1
20 TABLET, FILM COATED ORAL DAILY
Status: DISCONTINUED | OUTPATIENT
Start: 2018-11-01 | End: 2018-11-02 | Stop reason: HOSPADM

## 2018-10-31 RX ORDER — NITROGLYCERIN 0.4 MG/1
TABLET SUBLINGUAL
Qty: 1 TABLET | Refills: 0
Start: 2018-10-31 | End: 2019-04-01

## 2018-10-31 RX ORDER — ACETAMINOPHEN 325 MG/1
325-650 TABLET ORAL EVERY 6 HOURS PRN
Status: DISCONTINUED | OUTPATIENT
Start: 2018-10-31 | End: 2018-11-02 | Stop reason: HOSPADM

## 2018-10-31 RX ORDER — ONDANSETRON 4 MG/1
4 TABLET, ORALLY DISINTEGRATING ORAL EVERY 6 HOURS PRN
Status: DISCONTINUED | OUTPATIENT
Start: 2018-10-31 | End: 2018-11-02 | Stop reason: HOSPADM

## 2018-10-31 RX ORDER — HYDROMORPHONE HYDROCHLORIDE 1 MG/ML
0.5 INJECTION, SOLUTION INTRAMUSCULAR; INTRAVENOUS; SUBCUTANEOUS ONCE
Status: COMPLETED | OUTPATIENT
Start: 2018-10-31 | End: 2018-10-31

## 2018-10-31 RX ORDER — ALBUTEROL SULFATE 90 UG/1
2 AEROSOL, METERED RESPIRATORY (INHALATION) EVERY 6 HOURS PRN
Status: DISCONTINUED | OUTPATIENT
Start: 2018-10-31 | End: 2018-11-02 | Stop reason: HOSPADM

## 2018-10-31 RX ORDER — ACETAMINOPHEN 325 MG/1
650 TABLET ORAL EVERY 4 HOURS PRN
Status: DISCONTINUED | OUTPATIENT
Start: 2018-10-31 | End: 2018-11-01

## 2018-10-31 RX ORDER — SUCRALFATE ORAL 1 G/10ML
2 SUSPENSION ORAL ONCE
Status: COMPLETED | OUTPATIENT
Start: 2018-10-31 | End: 2018-10-31

## 2018-10-31 RX ORDER — MORPHINE SULFATE 2 MG/ML
2-4 INJECTION, SOLUTION INTRAMUSCULAR; INTRAVENOUS
Status: DISCONTINUED | OUTPATIENT
Start: 2018-10-31 | End: 2018-11-02 | Stop reason: HOSPADM

## 2018-10-31 RX ORDER — DEXTROSE MONOHYDRATE 25 G/50ML
25-50 INJECTION, SOLUTION INTRAVENOUS
Status: DISCONTINUED | OUTPATIENT
Start: 2018-10-31 | End: 2018-11-02 | Stop reason: HOSPADM

## 2018-10-31 RX ORDER — METOPROLOL TARTRATE 50 MG
50 TABLET ORAL 2 TIMES DAILY
Status: DISCONTINUED | OUTPATIENT
Start: 2018-10-31 | End: 2018-11-01

## 2018-10-31 RX ORDER — SODIUM CHLORIDE 9 MG/ML
INJECTION, SOLUTION INTRAVENOUS CONTINUOUS
Status: ACTIVE | OUTPATIENT
Start: 2018-10-31 | End: 2018-11-01

## 2018-10-31 RX ORDER — LIDOCAINE 40 MG/G
CREAM TOPICAL
Status: DISCONTINUED | OUTPATIENT
Start: 2018-10-31 | End: 2018-11-02 | Stop reason: HOSPADM

## 2018-10-31 RX ORDER — PROCHLORPERAZINE 25 MG
12.5 SUPPOSITORY, RECTAL RECTAL EVERY 12 HOURS PRN
Status: DISCONTINUED | OUTPATIENT
Start: 2018-10-31 | End: 2018-11-02 | Stop reason: HOSPADM

## 2018-10-31 RX ORDER — NALOXONE HYDROCHLORIDE 0.4 MG/ML
.1-.4 INJECTION, SOLUTION INTRAMUSCULAR; INTRAVENOUS; SUBCUTANEOUS
Status: DISCONTINUED | OUTPATIENT
Start: 2018-10-31 | End: 2018-11-02 | Stop reason: HOSPADM

## 2018-10-31 RX ORDER — DULOXETIN HYDROCHLORIDE 30 MG/1
60 CAPSULE, DELAYED RELEASE ORAL EVERY EVENING
Status: DISCONTINUED | OUTPATIENT
Start: 2018-10-31 | End: 2018-11-02 | Stop reason: HOSPADM

## 2018-10-31 RX ORDER — ALUMINA, MAGNESIA, AND SIMETHICONE 2400; 2400; 240 MG/30ML; MG/30ML; MG/30ML
30 SUSPENSION ORAL EVERY 4 HOURS PRN
Status: DISCONTINUED | OUTPATIENT
Start: 2018-10-31 | End: 2018-11-02 | Stop reason: HOSPADM

## 2018-10-31 RX ORDER — ASPIRIN 325 MG
325 TABLET ORAL DAILY
Status: DISCONTINUED | OUTPATIENT
Start: 2018-11-01 | End: 2018-10-31

## 2018-10-31 RX ORDER — ONDANSETRON 2 MG/ML
4 INJECTION INTRAMUSCULAR; INTRAVENOUS EVERY 6 HOURS PRN
Status: DISCONTINUED | OUTPATIENT
Start: 2018-10-31 | End: 2018-11-02 | Stop reason: HOSPADM

## 2018-10-31 RX ORDER — HYDROCODONE BITARTRATE AND ACETAMINOPHEN 5; 325 MG/1; MG/1
1 TABLET ORAL EVERY 8 HOURS PRN
Status: DISCONTINUED | OUTPATIENT
Start: 2018-10-31 | End: 2018-10-31

## 2018-10-31 RX ORDER — NITROGLYCERIN 0.4 MG/1
0.4 TABLET SUBLINGUAL ONCE
Status: COMPLETED | OUTPATIENT
Start: 2018-10-31 | End: 2018-10-31

## 2018-10-31 RX ORDER — NITROGLYCERIN 0.4 MG/1
0.4 TABLET SUBLINGUAL EVERY 5 MIN PRN
Status: DISCONTINUED | OUTPATIENT
Start: 2018-10-31 | End: 2018-11-02 | Stop reason: HOSPADM

## 2018-10-31 RX ORDER — CLOPIDOGREL BISULFATE 75 MG/1
75 TABLET ORAL EVERY EVENING
Status: DISCONTINUED | OUTPATIENT
Start: 2018-10-31 | End: 2018-11-02 | Stop reason: HOSPADM

## 2018-10-31 RX ORDER — IOPAMIDOL 755 MG/ML
83 INJECTION, SOLUTION INTRAVASCULAR ONCE
Status: COMPLETED | OUTPATIENT
Start: 2018-10-31 | End: 2018-10-31

## 2018-10-31 RX ADMIN — IOPAMIDOL 83 ML: 755 INJECTION, SOLUTION INTRAVENOUS at 23:15

## 2018-10-31 RX ADMIN — SUCRALFATE 2 G: 1 SUSPENSION ORAL at 20:32

## 2018-10-31 RX ADMIN — DULOXETINE HYDROCHLORIDE 60 MG: 30 CAPSULE, DELAYED RELEASE ORAL at 22:52

## 2018-10-31 RX ADMIN — MORPHINE SULFATE 4 MG: 4 INJECTION INTRAVENOUS at 17:59

## 2018-10-31 RX ADMIN — BUPROPION HYDROCHLORIDE 100 MG: 100 TABLET, FILM COATED, EXTENDED RELEASE ORAL at 23:41

## 2018-10-31 RX ADMIN — LIDOCAINE HYDROCHLORIDE 10 ML: 20 SOLUTION ORAL; TOPICAL at 20:00

## 2018-10-31 RX ADMIN — SODIUM CHLORIDE, PRESERVATIVE FREE 104 ML: 5 INJECTION INTRAVENOUS at 23:15

## 2018-10-31 RX ADMIN — HYDROMORPHONE HYDROCHLORIDE 0.5 MG: 1 INJECTION, SOLUTION INTRAMUSCULAR; INTRAVENOUS; SUBCUTANEOUS at 20:31

## 2018-10-31 RX ADMIN — CLOPIDOGREL 75 MG: 75 TABLET, FILM COATED ORAL at 22:52

## 2018-10-31 RX ADMIN — METOPROLOL TARTRATE 50 MG: 50 TABLET ORAL at 22:52

## 2018-10-31 RX ADMIN — LIDOCAINE HYDROCHLORIDE 10 ML: 20 SOLUTION ORAL; TOPICAL at 17:58

## 2018-10-31 RX ADMIN — HEPARIN SODIUM 1050 UNITS/HR: 10000 INJECTION, SOLUTION INTRAVENOUS at 23:39

## 2018-10-31 RX ADMIN — PANTOPRAZOLE SODIUM 40 MG: 40 INJECTION, POWDER, FOR SOLUTION INTRAVENOUS at 22:51

## 2018-10-31 RX ADMIN — SODIUM CHLORIDE: 9 INJECTION, SOLUTION INTRAVENOUS at 23:38

## 2018-10-31 RX ADMIN — NITROGLYCERIN 0.4 MG: 0.4 TABLET SUBLINGUAL at 20:01

## 2018-10-31 ASSESSMENT — ENCOUNTER SYMPTOMS
VOMITING: 0
NAUSEA: 0
SHORTNESS OF BREATH: 0
ABDOMINAL PAIN: 0
LIGHT-HEADEDNESS: 0
BLOOD IN STOOL: 0

## 2018-10-31 ASSESSMENT — PAIN DESCRIPTION - DESCRIPTORS: DESCRIPTORS: CONSTANT

## 2018-10-31 NOTE — IP AVS SNAPSHOT
[facility]    [address] [place] [address]., [address]    [place] [address]    Phone:  [phone]                                       After Visit Summary   10/31/2018    Moira Andre    MRN: [id number]           After Visit Summary Signature Page     I have received my discharge instructions, and my questions have been answered. I have discussed any challenges I see with this plan with the nurse or doctor.    ..........................................................................................................................................  Patient/Patient Representative Signature      ..........................................................................................................................................  Patient Representative Print Name and Relationship to Patient    ..................................................               ................................................  Date                                   Time    ..........................................................................................................................................  Reviewed by Signature/Title    ...................................................              ..............................................  Date                                               Time          22EPIC Rev 08/18

## 2018-10-31 NOTE — NURSING NOTE
The following medication was given at 4:48pm:     MEDICATION: Nitrostat 0.4mg  ROUTE: PO  SITE: mouth  DOSE: 0.4mg  LOT #: [id number]  :  Dr. Delgado's  EXPIRATION DATE:  12/2019  NDC: 27046-045-44  JUAN LUIS Mccurdy MA

## 2018-10-31 NOTE — IP AVS SNAPSHOT
MRN:[id number]                      After Visit Summary   10/31/2018    Moira Andre    MRN: [id number]           Thank you!     Thank you for choosing [facility] for your care. Our goal is always to provide you with excellent care. Hearing back from our patients is one way we can continue to improve our services. Please take a few minutes to complete the written survey that you may receive in the mail after you visit with us. Thank you!        Patient Information     Date Of Birth          9/24/1933        Designated Caregiver       Most Recent Value    Caregiver    Will someone help with your care after discharge? no      About your hospital stay     You were admitted on:  October 31, 2018 You last received care in the:  [facility]    You were discharged on:  November 2, 2018       Who to Call     For medical emergencies, please call 911.  For non-urgent questions about your medical care, please call your primary care provider or clinic, [phone]          Attending Provider     Provider Specialty    Lyle Vivas MD Emergency Medicine    [facility], [facility], Nick Gutierrez MD Internal Medicine       Primary Care Provider Office Phone # Fax #    Maryan Churchill [phone]      After Care Instructions     Activity       Your activity upon discharge: activity as tolerated            Diet       Follow this diet upon discharge: Regular                  Follow-up Appointments     Follow-up and recommended labs and tests        Follow up with primary care provider, Maryan Churchill, within 5-7 days for hospital follow- up.  No follow up labs or test are needed.                  Your next 10 appointments already scheduled     Nov 08, 2018 11:30 AM CST   [facility]    [address] [facility] [id number] [phone]           Bring a current list of meds and any records  "pertaining to this visit. For Physicals, please bring immunization records and any forms needing to be filled out. Please arrive 10 minutes early to complete paperwork.            Dec 03, 2018  9:00 AM CST   Office Visit with Maryan Churchill MD   [facility] ([facility]    [address] [facility] [id number] [phone]           Bring a current list of meds and any records pertaining to this visit. For Physicals, please bring immunization records and any forms needing to be filled out. Please arrive 10 minutes early to complete paperwork.              Pending Results     Date and Time Order Name Status Description    11/1/2018 0352 EKG 12-lead, tracing only Preliminary             Statement of Approval     Ordered          11/02/18 0937  I have reviewed and agree with all the recommendations and orders detailed in this document.  EFFECTIVE NOW     Approved and electronically signed by:  Shanthi Payton MD             Admission Information     Date & Time Provider Department Dept. Phone    10/31/2018 Nick Daniels MD [facility] [phone]      Your Vitals Were     Blood Pressure Pulse Temperature Respirations Height Weight    137/65 (BP Location: Right arm) 100 98.2  F (36.8  C) (Oral) 18 1.702 m (5' 7\") 127.1 kg (280 lb 3.2 oz)    Pulse Oximetry BMI (Body Mass Index)                96% 43.89 kg/m2          Care EveryWhere ID     This is your Care EveryWhere ID. This could be used by other organizations to access your [facility] medical records  [id number]        Equal Access to Services     [facility] AH: Hadii aad ku hadasho Somarquitaali, waaxda luqadaha, qaybta kaalmada adeegyada, andreina bell. So [facility] [phone].    ATENCIÓN: Si habla español, tiene a gold disposición servicios gratuitos de asistencia lingüística. Llame al [phone].    We comply with applicable federal civil rights laws and [place] " laws. We do not discriminate on the basis of race, color, national origin, age, disability, sex, sexual orientation, or gender identity.               Review of your medicines      CONTINUE these medicines which may have CHANGED, or have new prescriptions. If we are uncertain of the size of tablets/capsules you have at home, strength may be listed as something that might have changed.        Dose / Directions    clopidogrel 75 MG tablet   Commonly known as:  PLAVIX   This may have changed:  when to take this   Used for:  Coronary artery disease involving native coronary artery of native heart without angina pectoris        Dose:  75 mg   Take 1 tablet (75 mg) by mouth daily   Quantity:  90 tablet   Refills:  3       DULoxetine 60 MG EC capsule   Commonly known as:  CYMBALTA   This may have changed:  when to take this   Used for:  Moderate major depression (H)        Dose:  60 mg   Take 1 capsule (60 mg) by mouth daily   Quantity:  90 capsule   Refills:  1       neomycin-polymyxin-hydrocortisone otic solution   Commonly known as:  CORTISPORIN   This may have changed:    - when to take this  - reasons to take this   Used for:  Left ear pain   Notes to Patient:  Held during hospitalization, resume home regimen        Dose:  4 drop   Place 4 drops into both ears 4 times daily   Quantity:  10 mL   Refills:  0       nystatin-triamcinolone cream   Commonly known as:  MYCOLOG II   This may have changed:  See the new instructions.   Used for:  Tinea of the body   Notes to Patient:  Held during hospitalization, resume home regimen        APPLY TOPICALLY TWICE DAILY   Quantity:  60 g   Refills:  1       ranitidine 150 MG tablet   Commonly known as:  ZANTAC   This may have changed:    - when to take this  - reasons to take this   Used for:  Dry cough   Notes to Patient:  Held during hospitalization, resume home regimen        Dose:  150 mg   Take 1 tablet (150 mg) by mouth 2 times daily   Quantity:  60 tablet   Refills:  1          CONTINUE these medicines which have NOT CHANGED        Dose / Directions    acetaminophen 325 MG tablet   Commonly known as:  TYLENOL        Dose:  325-650 mg   Take 325-650 mg by mouth every 6 hours as needed for mild pain   Refills:  0       ADVAIR DISKUS 100-50 MCG/DOSE diskus inhaler   Used for:  Intermittent asthma, uncomplicated   Generic drug:  fluticasone-salmeterol        INHALE 1 PUFF EVERY 12 HOURS   Quantity:  3 Inhaler   Refills:  3       albuterol 108 (90 Base) MCG/ACT inhaler   Commonly known as:  PROAIR HFA/PROVENTIL HFA/VENTOLIN HFA   Used for:  Intermittent asthma, uncomplicated        Dose:  2 puff   Inhale 2 puffs into the lungs every 6 hours as needed for shortness of breath / dyspnea   Quantity:  1 Inhaler   Refills:  3       atorvastatin 20 MG tablet   Commonly known as:  LIPITOR   Used for:  Hyperlipidemia LDL goal <70        Dose:  20 mg   Take 1 tablet (20 mg) by mouth daily   Quantity:  90 tablet   Refills:  3       * blood glucose monitoring meter device kit   Commonly known as:  no brand specified        Use to test blood sugar 1-2 times daily or as directed.   Quantity:  1 kit   Refills:  0       * TRUE METRIX AIR GLUCOSE METER w/Device Kit        USE AS DIRECTED   Quantity:  1 kit   Refills:  0       blood glucose monitoring test strip   Commonly known as:  TRUE METRIX BLOOD GLUCOSE TEST        Dose:  1 strip   1 strip by In Vitro route daily   Quantity:  200 strip   Refills:  3       buPROPion 100 MG 12 hr tablet   Commonly known as:  WELLBUTRIN SR   Used for:  Moderate episode of recurrent major depressive disorder (H)        Dose:  100 mg   Take 1 tablet (100 mg) by mouth 2 times daily   Quantity:  60 tablet   Refills:  3       FLONASE 50 MCG/ACT spray   Generic drug:  fluticasone        Dose:  1 spray   Spray 1 spray into both nostrils daily as needed   Refills:  0       glimepiride 2 MG tablet   Commonly known as:  AMARYL   Notes to Patient:  Held during hospitalization,  resume home regimen        Dose:  2 mg   Take 1 tablet (2 mg) by mouth every morning (before breakfast)   Quantity:  90 tablet   Refills:  1       HYDROcodone-acetaminophen 5-325 MG per tablet   Commonly known as:  NORCO   Used for:  Multiple joint pain        Dose:  1 tablet   Take 1 tablet by mouth every 8 hours as needed for moderate to severe pain   Quantity:  90 tablet   Refills:  0       IBANdronate 150 MG tablet   Commonly known as:  BONIVA   Notes to Patient:  Held during hospitalization, resume home regimen        Dose:  150 mg   Take 150 mg by mouth every 30 days Patient takes on the first day of each month.   Refills:  0       ICY HOT EX        Apply to affected area every morning   Refills:  0       lisinopril 20 MG tablet   Commonly known as:  PRINIVIL/ZESTRIL   Used for:  Essential hypertension   Notes to Patient:  Held during hospitalization, resume home regimen        Dose:  20 mg   Take 1 tablet (20 mg) by mouth daily   Refills:  0       metoprolol succinate 100 MG 24 hr tablet   Commonly known as:  TOPROL-XL        Dose:  100 mg   Take 100 mg by mouth daily   Refills:  0       nitroGLYcerin 0.4 MG sublingual tablet   Commonly known as:  NITROSTAT   Used for:  Chest pain, unspecified type, History of coronary artery disease, Essential hypertension        1 tab po in clinic   Quantity:  1 tablet   Refills:  0       order for DME   Notes to Patient:  Held during hospitalization, resume home regimen        DREAMSTATION 5-15 CM/H20 NASAL WISP FABRIC   Refills:  0       TRUEPLUS LANCETS 28G Misc        Dose:  1 each   1 each 2 times daily as needed   Quantity:  100 each   Refills:  3       vitamin D 2000 units Caps   Notes to Patient:  Held during hospitalization, resume home regimen        Dose:  2000 Units   Take 2,000 Units by mouth daily   Refills:  0       * Notice:  This list has 2 medication(s) that are the same as other medications prescribed for you. Read the directions carefully, and ask your  doctor or other care provider to review them with you.             Protect others around you: Learn how to safely use, store and throw away your medicines at www.disposemymeds.org.             Medication List: This is a list of all your medications and when to take them. Check marks below indicate your daily home schedule. Keep this list as a reference.      Medications           Morning Afternoon Evening Bedtime As Needed    acetaminophen 325 MG tablet   Commonly known as:  TYLENOL   Take 325-650 mg by mouth every 6 hours as needed for mild pain                                   ADVAIR DISKUS 100-50 MCG/DOSE diskus inhaler   INHALE 1 PUFF EVERY 12 HOURS   Generic drug:  fluticasone-salmeterol   Last time this was given:  11/2/18 AM   Next Dose Due:  11/2/18 PM                    Today 11/2/18 PM               albuterol 108 (90 Base) MCG/ACT inhaler   Commonly known as:  PROAIR HFA/PROVENTIL HFA/VENTOLIN HFA   Inhale 2 puffs into the lungs every 6 hours as needed for shortness of breath / dyspnea   Last time this was given:  2 puffs on 11/1/2018 10:54 PM                                   atorvastatin 20 MG tablet   Commonly known as:  LIPITOR   Take 1 tablet (20 mg) by mouth daily   Last time this was given:  20 mg on 11/2/2018  8:07 AM   Last time this was given:  11/2/18 AM   Next Dose Due:  11/3/18 AM            Tomorrow AM 11/3/18                       * blood glucose monitoring meter device kit   Commonly known as:  no brand specified   Use to test blood sugar 1-2 times daily or as directed.                                * TRUE METRIX AIR GLUCOSE METER w/Device Kit   USE AS DIRECTED                                blood glucose monitoring test strip   Commonly known as:  TRUE METRIX BLOOD GLUCOSE TEST   1 strip by In Vitro route daily                                buPROPion 100 MG 12 hr tablet   Commonly known as:  WELLBUTRIN SR   Take 1 tablet (100 mg) by mouth 2 times daily   Last time this was given:  100  mg on 11/2/2018  8:07 AM   Next Dose Due:  11/2/18 PM                    Today 11/2/18 PM               clopidogrel 75 MG tablet   Commonly known as:  PLAVIX   Take 1 tablet (75 mg) by mouth daily   Last time this was given:  75 mg on 11/1/2018  8:45 PM   Next Dose Due:  11/2/18 PM                    Today 11/2/18 PM               DULoxetine 60 MG EC capsule   Commonly known as:  CYMBALTA   Take 1 capsule (60 mg) by mouth daily   Last time this was given:  60 mg on 11/1/2018  8:45 PM   Next Dose Due:  11/2/18 PM                    Today 11/2/18 PM               FLONASE 50 MCG/ACT spray   Spray 1 spray into both nostrils daily as needed   Generic drug:  fluticasone                                   glimepiride 2 MG tablet   Commonly known as:  AMARYL   Take 1 tablet (2 mg) by mouth every morning (before breakfast)   Notes to Patient:  Held during hospitalization, resume home regimen            Tomorrow 11/3/18 AM                       HYDROcodone-acetaminophen 5-325 MG per tablet   Commonly known as:  NORCO   Take 1 tablet by mouth every 8 hours as needed for moderate to severe pain   Last time this was given:  1 tablet on 11/1/2018  9:10 AM                                   IBANdronate 150 MG tablet   Commonly known as:  BONIVA   Take 150 mg by mouth every 30 days Patient takes on the first day of each month.   Notes to Patient:  Held during hospitalization, resume home regimen                                ICY HOT EX   Apply to affected area every morning                                   lisinopril 20 MG tablet   Commonly known as:  PRINIVIL/ZESTRIL   Take 1 tablet (20 mg) by mouth daily   Notes to Patient:  Held during hospitalization, resume home regimen            Tomorrow 11/3/18                       metoprolol succinate 100 MG 24 hr tablet   Commonly known as:  TOPROL-XL   Take 100 mg by mouth daily   Last time this was given:  100 mg on 11/2/2018  8:07 AM   Last time this was given:  11/3/18             Tomorrow AM 11/3/18                       neomycin-polymyxin-hydrocortisone otic solution   Commonly known as:  CORTISPORIN   Place 4 drops into both ears 4 times daily   Notes to Patient:  Held during hospitalization, resume home regimen                                            nitroGLYcerin 0.4 MG sublingual tablet   Commonly known as:  NITROSTAT   1 tab po in clinic   Last time this was given:  0.4 mg on 11/1/2018  4:09 AM                                   nystatin-triamcinolone cream   Commonly known as:  MYCOLOG II   APPLY TOPICALLY TWICE DAILY   Notes to Patient:  Held during hospitalization, resume home regimen            Tomorrow AM 11/3/18           Tomorrow PM 11/3/18               order for DME   DREAMSTATION 5-15 CM/H20 NASAL WISP FABRIC   Notes to Patient:  Held during hospitalization, resume home regimen                                ranitidine 150 MG tablet   Commonly known as:  ZANTAC   Take 1 tablet (150 mg) by mouth 2 times daily   Notes to Patient:  Held during hospitalization, resume home regimen            Tomorrow AM 11/3/18           Tomorrow PM 11/3/18               TRUEPLUS LANCETS 28G Misc   1 each 2 times daily as needed                                vitamin D 2000 units Caps   Take 2,000 Units by mouth daily   Notes to Patient:  Held during hospitalization, resume home regimen            Tomorrow AM 11/3/18                        * Notice:  This list has 2 medication(s) that are the same as other medications prescribed for you. Read the directions carefully, and ask your doctor or other care provider to review them with you.

## 2018-10-31 NOTE — ED NOTES
Bed: ED15  Expected date:   Expected time:   Means of arrival:   Comments:  533  85 F chest pain/anxiety  1732

## 2018-10-31 NOTE — MR AVS SNAPSHOT
After Visit Summary   10/31/2018    Moira Andre    MRN: [id number]           Patient Information     Date Of Birth          9/24/1933        Visit Information        Provider Department      10/31/2018 4:45 PM Leland Palacios,  [facility]        Today's Diagnoses     Chest pain, unspecified type    -  1    History of coronary artery disease        Type 2 diabetes mellitus with diabetic nephropathy, without long-term current use of insulin (H)        Essential hypertension           Follow-ups after your visit        Your next 10 appointments already scheduled     Dec 03, 2018  9:00 AM CST   Office Visit with Maryan Churchill MD   [facility] ([facility]    [address] [facility] [address] [phone]           Bring a current list of meds and any records pertaining to this visit. For Physicals, please bring immunization records and any forms needing to be filled out. Please arrive 10 minutes early to complete paperwork.              Who to contact     If you have questions or need follow up information about today's clinic visit or your schedule please contact [facility] directly at [phone].  Normal or non-critical lab and imaging results will be communicated to you by MyChart, letter or phone within 4 business days after the clinic has received the results. If you do not hear from us within 7 days, please contact the clinic through MyChart or phone. If you have a critical or abnormal lab result, we will notify you by phone as soon as possible.  Submit refill requests through [employer] or call your pharmacy and they will forward the refill request to us. Please allow 3 business days for your refill to be completed.          Additional Information About Your Visit        Care EveryWhere ID     This is your Care EveryWhere ID. This could be used by other organizations to access your [facility]  records  [id number]        Your Vitals Were     Pulse Temperature Pulse Oximetry             112 97.8  F (36.6  C) (Tympanic) 94%          Blood Pressure from Last 3 Encounters:   10/31/18 (!) 187/99   10/04/18 148/78   09/17/18 124/72    Weight from Last 3 Encounters:   10/04/18 283 lb (128.4 kg)   09/17/18 280 lb (127 kg)   08/31/18 285 lb (129.3 kg)              We Performed the Following     EKG 12-lead complete w/read - Clinics          Today's Medication Changes          These changes are accurate as of 10/31/18  5:15 PM.  If you have any questions, ask your nurse or doctor.               These medicines have changed or have updated prescriptions.        Dose/Directions    neomycin-polymyxin-hydrocortisone otic solution   Commonly known as:  CORTISPORIN   This may have changed:    - when to take this  - reasons to take this   Used for:  Left ear pain        Dose:  4 drop   Place 4 drops into both ears 4 times daily   Quantity:  10 mL   Refills:  0       * NITROQUICK SL   This may have changed:  Another medication with the same name was added. Make sure you understand how and when to take each.        Dose:  0.4 mg   Place 0.4 mg under the tongue every 5 minutes as needed   Refills:  0       * nitroGLYcerin 0.4 MG sublingual tablet   Commonly known as:  NITROSTAT   This may have changed:  You were already taking a medication with the same name, and this prescription was added. Make sure you understand how and when to take each.   Used for:  Chest pain, unspecified type, History of coronary artery disease, Type 2 diabetes mellitus with diabetic nephropathy, without long-term current use of insulin (H), Essential hypertension        1 tab po in clinic   Quantity:  1 tablet   Refills:  0       nystatin-triamcinolone cream   Commonly known as:  MYCOLOG II   This may have changed:  See the new instructions.   Used for:  Tinea of the body        APPLY TOPICALLY TWICE DAILY   Quantity:  60 g   Refills:  1       *  Notice:  This list has 2 medication(s) that are the same as other medications prescribed for you. Read the directions carefully, and ask your doctor or other care provider to review them with you.         Where to get your medicines      Some of these will need a paper prescription and others can be bought over the counter.  Ask your nurse if you have questions.     You don't need a prescription for these medications     nitroGLYcerin 0.4 MG sublingual tablet                Primary Care Provider Office Phone # Fax #    Maryan Churchill [phone] [address] [facility] [address]  [facility] [address]        Equal Access to Services     [facility]: Hadii aad ku hadhany Grey, waaxda liza, qaybta kaalmada gisella, andreina shaw . So [facility] [phone].    ATENCIÓN: Si habla español, tiene a gold disposición servicios gratuitos de asistencia lingüística. [facility] [phone].    We comply with applicable federal civil rights laws and [place] laws. We do not discriminate on the basis of race, color, national origin, age, disability, sex, sexual orientation, or gender identity.            Thank you!     Thank you for choosing [facility]  for your care. Our goal is always to provide you with excellent care. Hearing back from our patients is one way we can continue to improve our services. Please take a few minutes to complete the written survey that you may receive in the mail after your visit with us. Thank you!             Your Updated Medication List - Protect others around you: Learn how to safely use, store and throw away your medicines at www.disposemymeds.org.          This list is accurate as of 10/31/18  5:15 PM.  Always use your most recent med list.                   Brand Name Dispense Instructions for use Diagnosis    acetaminophen 325 MG tablet    TYLENOL     Take 325-650 mg by mouth every 6 hours as needed for mild  pain        ADVAIR DISKUS 100-50 MCG/DOSE diskus inhaler   Generic drug:  fluticasone-salmeterol     3 Inhaler    INHALE 1 PUFF EVERY 12 HOURS    Intermittent asthma, uncomplicated       albuterol 108 (90 Base) MCG/ACT inhaler    PROAIR HFA/PROVENTIL HFA/VENTOLIN HFA    1 Inhaler    Inhale 2 puffs into the lungs every 6 hours as needed for shortness of breath / dyspnea    Intermittent asthma, uncomplicated       atorvastatin 20 MG tablet    LIPITOR    90 tablet    Take 1 tablet (20 mg) by mouth daily    Hyperlipidemia LDL goal <70       * blood glucose monitoring meter device kit    no brand specified    1 kit    Use to test blood sugar 1-2 times daily or as directed.    Type 2 diabetes mellitus with diabetic nephropathy (H)       * TRUE METRIX AIR GLUCOSE METER w/Device Kit     1 kit    USE AS DIRECTED    Type 2 diabetes mellitus with diabetic nephropathy (H)       blood glucose monitoring test strip    TRUE METRIX BLOOD GLUCOSE TEST    200 strip    1 strip by In Vitro route daily    Type 2 diabetes mellitus with diabetic nephropathy, without long-term current use of insulin (H)       buPROPion 100 MG 12 hr tablet    WELLBUTRIN SR    60 tablet    Take 1 tablet (100 mg) by mouth 2 times daily    Moderate episode of recurrent major depressive disorder (H)       clopidogrel 75 MG tablet    PLAVIX    90 tablet    Take 1 tablet (75 mg) by mouth daily    Coronary artery disease involving native coronary artery of native heart without angina pectoris       DULoxetine 60 MG EC capsule    CYMBALTA    90 capsule    Take 1 capsule (60 mg) by mouth daily    Moderate major depression (H)       FLONASE 50 MCG/ACT spray   Generic drug:  fluticasone      Spray 1 spray into both nostrils as needed        furosemide 20 MG tablet    LASIX    15 tablet    Take 1 tablet (20 mg) by mouth daily as needed    Edema of lower extremity       glimepiride 2 MG tablet    AMARYL    90 tablet    Take 1 tablet (2 mg) by mouth every morning (before  breakfast)    Type 2 diabetes mellitus with diabetic nephropathy, without long-term current use of insulin (H)       HYDROcodone-acetaminophen 5-325 MG per tablet    NORCO    90 tablet    Take 1 tablet by mouth every 8 hours as needed for moderate to severe pain    Multiple joint pain       IBANdronate 150 MG tablet    BONIVA     Take 150 mg by mouth every 30 days Patient takes on the first day of each month.        ICY HOT EX      Apply to affected area every morning        lisinopril 20 MG tablet    PRINIVIL/ZESTRIL     Take 1 tablet (20 mg) by mouth daily    Essential hypertension       neomycin-polymyxin-hydrocortisone otic solution    CORTISPORIN    10 mL    Place 4 drops into both ears 4 times daily    Left ear pain       NIFEdipine ER 30 MG Tb24    ADALAT CC    14 tablet    Take 1 tablet (30 mg) by mouth daily    Ureteral stone       * NITROQUICK SL      Place 0.4 mg under the tongue every 5 minutes as needed        * nitroGLYcerin 0.4 MG sublingual tablet    NITROSTAT    1 tablet    1 tab po in clinic    Chest pain, unspecified type, History of coronary artery disease, Type 2 diabetes mellitus with diabetic nephropathy, without long-term current use of insulin (H), Essential hypertension       nystatin-triamcinolone cream    MYCOLOG II    60 g    APPLY TOPICALLY TWICE DAILY    Tinea of the body       order for DME      DREAMSTATION 5-15 CM/H20 NASAL WISP FABRIC        ranitidine 150 MG tablet    ZANTAC    60 tablet    Take 1 tablet (150 mg) by mouth 2 times daily    Dry cough       TRUEPLUS LANCETS 28G Misc     100 each    1 each 2 times daily as needed    Type 2 diabetes mellitus with diabetic nephropathy (H)       vitamin D 2000 units Caps      Take by mouth daily        * Notice:  This list has 4 medication(s) that are the same as other medications prescribed for you. Read the directions carefully, and ask your doctor or other care provider to review them with you.

## 2018-10-31 NOTE — PROGRESS NOTES
SUBJECTIVE: Moira Andre is a 85 year old female presenting with a chief complaint of CP.  Onset of symptoms was this am ago.  Course of illness is same.    Severity moderate  Current and Associated symptoms: radiates to Jaw  Treatment measures tried include None tried.  Predisposing factors include None.    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     Myocardial infarction (H) 9/2007, cath 2013 ml    BMS: stent to OM, diag, nl EF, echo /C angia 2013 , f/u cath no lesion >40%     Nephrolithiasis     right side     OA (osteoarthritis) of knee      Obesity      Rheumatoid arthritis flare (H)     prednisone     Sleep apnea     restarted using cpap 2017     TIA (transient ischaemic attack)      Ventral hernia, unspecified, without mention of obstruction or gangrene      Allergies   Allergen Reactions     Aspirin Hives     Reaction occurred during childhood.      Metformin      Elevated lactic acid     Minocycline      Yellow Dye Allergy. Minocycline has Yellow Dye #10.     Yellow Dye Hives     Rxn to yellow tablet. Eyes swelled shut.      Social History   Substance Use Topics     Smoking status: Former Smoker     Packs/day: 0.25     Types: Cigarettes     Start date: 1972     Quit date: 4/24/1973     Smokeless tobacco: Never Used     Alcohol use 0.0 - 0.6 oz/week     0 - 1 Standard drinks or equivalent per week      Comment: rarely       ROS:  SKIN: no rash  GI: no vomiting    OBJECTIVE:  BP (!)  187/99  Pulse 112  Temp 97.8  F (36.6  C) (Tympanic)  SpO2 94%   GENERAL APPEARANCE: healthy, alert and no distress  EYES: EOMI,  PERRL, conjunctiva clear  HENT: ear canals and TM's normal.  Nose and mouth without ulcers, erythema or lesions  NECK: supple, nontender, no lymphadenopathy  RESP: lungs clear to auscultation - no rales, rhonchi or wheezes  CV: regular rates and rhythm, normal S1 S2, no murmur noted  SKIN: no suspicious lesions or rashes      ICD-10-CM    1. Chest pain, unspecified type R07.9 EKG 12-lead complete w/read - Clinics     nitroGLYcerin (NITROSTAT) 0.4 MG sublingual tablet     CANCELED: Troponin I     CANCELED: Troponin I   2. History of coronary artery disease Z86.79 EKG 12-lead complete w/read - Clinics     nitroGLYcerin (NITROSTAT) 0.4 MG sublingual tablet   3. Type 2 diabetes mellitus with diabetic nephropathy, without long-term current use of insulin (H) E11.21 EKG 12-lead complete w/read - Clinics     nitroGLYcerin (NITROSTAT) 0.4 MG sublingual tablet   4. Essential hypertension I10 EKG 12-lead complete w/read - Clinics     nitroGLYcerin (NITROSTAT) 0.4 MG sublingual tablet     Fluids/Rest, f/u if worse/not any better

## 2018-10-31 NOTE — ED PROVIDER NOTES
History     Chief Complaint:    Chest Pain       HPI   Moira Andre is a 85 year old female on Plavix with a complex medical history significant for anxiety, CAD s/p stent, CKD, CHF, diabetes, HTN, and MI who presents to the ED via EMS for evaluation of chest pain. The patient states that she developed mod to severe mid-sternal chest pain/pressure this morning around 1000. She states that her chest only hurts when she breathes and radiates to her jaw with deep breathes. She initially presented to urgent care for evaluation of her symptoms; she was given one nitroglycerin tab and referred to the ED for further evaluation. EMS reports that her initial blood pressure was about 180 systolic but dropped to about 130 on recheck after she relaxed, her O2 saturation was 95% on room air. She reports no relief of her symptoms with nitroglycerin. She otherwise denies nausea, vomit, abdominal pain, dysuria, blood in her stool, or light headedness. Patient has some baseline exertional dyspnea she attributes to her weight but otherwise denies new or worse shortness of breath. Patient is anxious/tearful here in the ED and reports that she did not take her anxiety or depression medication this morning.     Allergies:  Aspirin  Metformin  Minocycline  Yellow dye     Medications:    Tylenol  Advair diskus  Albuterol  Lipitor  Wellbutrin  Plavix  Cymbalta  Flonase  Lasix  Amaryl  Norco  Boniva  Lisinopril  Nitrostat  Zantac     Past Medical History:    Aortic valve sclerosis  Anxiety  Arrhythmia  Asthma  CKD  CHF  Depression  Diabetes  Diastolic dysfunction, left ventricle  HTN  Lactic acidosis  Migraine  Mitral stenosis  MI  Nephrolithiasis  OA  RA  Sleep apnea  TIA  Ventral hernia    Past Surgical History:    Appendectomy  Breast biopsy  Cholecystectomy  Coronary angiography (x2)  Left heart catheterization  Total abdominal hysterectomy  Bilateral knee replacement  Bilateral carpal tunnel release  Right femoral artery  "pseudoaneurysm repair    Family History:    MS  CAD    Social History:  Former smoker of one year, quit 1973.  Positive for alcohol use, rarely.  Marital Status:   [4]     Review of Systems   Respiratory: Negative for shortness of breath.    Cardiovascular: Positive for chest pain.   Gastrointestinal: Negative for abdominal pain, blood in stool, nausea and vomiting.   Neurological: Negative for light-headedness.   All other systems reviewed and are negative.      Physical Exam   First Vitals:  BP: (!) 142/101  Pulse: 91  Heart Rate: 90  Temp: 99.1  F (37.3  C)  Resp: 22  Height: 170.2 cm (5' 7\")  Weight: 124.3 kg (274 lb)  SpO2: 95 %      Physical Exam  Constitutional: Well developed, nontox appearance  Head: Atraumatic.   Mouth/Throat: Oropharynx is clear and moist.   Neck:  no stridor  Eyes: no scleral icterus  Cardiovascular: RRR, 2+ bilat radial pulses  Pulmonary/Chest: nml resp effort, Clear BS bilat, chest NT  Abdominal: ND, +BS, soft, NT, no rebound or guarding   : no CVA tenderness bilat  Ext: Warm, well perfused, no edema  Neurological: A&O, symmetric facies, moves ext x4  Skin: Skin is warm and dry.   Psychiatric: Anxious, mildly tearful.  Behavior is normal. Thought content normal.   Nursing note and vitals reviewed    Emergency Department Course   ECG:  Indication: chest pain  Time: 1756  Vent. Rate 89 bpm. CT interval 300. QRS duration 98. QT/QTc 356/433. P-R-T axis 47 -20 83.  Sinus rhythm with sinus arrhythmia with 1st degree AV block. Anterior infarct, age undetermined. Abnormal ECG. No significant change compared to EKG dated 7/4/18. Read time: 1800      Imaging:  Radiographic findings were communicated with the patient who voiced understanding of the findings.  XR Chest 2 views:   Nothing clearly acute. No interval change, as per radiology.      Laboratory:  CBC: WBC: 10.6, HGB: 12.2, PLT: 271  CMP: Glucose 136 (H), Creatinine 1.32 (H), GFR estimate 38 (L), o/w WNL     Lipase: 203  D " dimer: 0.7 (H)  175 Troponin: <0.015      Interventions:  175 Lidocaine 10 mLs mouth/throat   Morphine 4 mg IV   Lidocaine 10 mLs mouth/throat   Nitrostat 0.4 mg sublingual       Emergency Department Course:  Nursing notes and vitals reviewed. (174) I performed an exam of the patient as documented above.     EKG obtained in the ED, see results above.     IV inserted. Medicine administered as documented above. Blood drawn. This was sent to the lab for further testing, results above.     The patient was sent for a chest XR while in the emergency department, findings above.      I rechecked the patient and discussed the results of her workup thus far.       I consulted with Dr. Daniels of the hospitalist services. They are in agreement to accept the patient for admission.    Findings and plan explained to the Patient who consents to admission. Discussed the patient with Dr. Daniels, who will admit the patient to a obs bed for further monitoring, evaluation, and treatment.    Impression & Plan    Medical Decision Makin year old female presenting w/ chest pain, pleuritic     DDx includes chest wall pain, esophageal spasm, GERD, ACS, PE, pericarditis, pneumothorax, pneumonia, rib fracture although unlikely given no history of trauma.  EKG interpretation as noted above and unchanged from previous without acute ischemic findings.  Labs and imaging ordered as noted above.  Labs significant for troponin within normal limits, d-dimer within age-adjusted normal limits.  Imaging sig for no acute cardiopulmonary disease.  GI cocktail given with equivocal change in the patient's symptoms.  She reports overall she feels more calm but continues to endorse chest pain.  She reports she was asymptomatic when she had stents placed therefore she is unable to state whether this feels similar to her previous cardiac symptoms.  Given patient's age, history of coronary artery disease, last catheterization greater than  1 year ago, I think would be reasonable to admit the patient for observation for serial troponin levels and possible stress test in the morning. While the pleuritic nature of the symptoms is atypical for cardiac pain, I do not have a clear diagnosis explaining her symptoms.  Pt counseled on all results, disposition and diagnosis.  Pt understanding and agreeable to plan. Patient admitted in stable condition      Diagnosis:    ICD-10-CM    1. Chest pain, unspecified type R07.9        Disposition:  Admitted to Dr. Jeremiah Vogel Disclosure:  I,  César Verdin, am serving as a scribe on 10/31/2018 at 5:45 PM to personally document services performed by Lyle Vivas MD based on my observations and the provider's statements to me.          César Verdin  10/31/2018    EMERGENCY DEPARTMENT       Lyle Vivas MD  11/01/18 1048

## 2018-11-01 ENCOUNTER — APPOINTMENT (OUTPATIENT)
Dept: CARDIOLOGY | Facility: CLINIC | Age: 83
DRG: 313 | End: 2018-11-01
Attending: INTERNAL MEDICINE
Payer: COMMERCIAL

## 2018-11-01 LAB
ANION GAP SERPL CALCULATED.3IONS-SCNC: 6 MMOL/L (ref 3–14)
BUN SERPL-MCNC: 20 MG/DL (ref 7–30)
CALCIUM SERPL-MCNC: 8.2 MG/DL (ref 8.5–10.1)
CHLORIDE SERPL-SCNC: 105 MMOL/L (ref 94–109)
CO2 SERPL-SCNC: 25 MMOL/L (ref 20–32)
CREAT SERPL-MCNC: 1.35 MG/DL (ref 0.52–1.04)
ERYTHROCYTE [DISTWIDTH] IN BLOOD BY AUTOMATED COUNT: 14.7 % (ref 10–15)
GFR SERPL CREATININE-BSD FRML MDRD: 37 ML/MIN/1.7M2
GLUCOSE BLDC GLUCOMTR-MCNC: 207 MG/DL (ref 70–99)
GLUCOSE BLDC GLUCOMTR-MCNC: 97 MG/DL (ref 70–99)
GLUCOSE SERPL-MCNC: 132 MG/DL (ref 70–99)
HCT VFR BLD AUTO: 36.9 % (ref 35–47)
HGB BLD-MCNC: 11.6 G/DL (ref 11.7–15.7)
LMWH PPP CHRO-ACNC: 0.11 IU/ML
MCH RBC QN AUTO: 27.3 PG (ref 26.5–33)
MCHC RBC AUTO-ENTMCNC: 31.4 G/DL (ref 31.5–36.5)
MCV RBC AUTO: 87 FL (ref 78–100)
PLATELET # BLD AUTO: 258 10E9/L (ref 150–450)
POTASSIUM SERPL-SCNC: 5 MMOL/L (ref 3.4–5.3)
RBC # BLD AUTO: 4.25 10E12/L (ref 3.8–5.2)
SODIUM SERPL-SCNC: 136 MMOL/L (ref 133–144)
TROPONIN I SERPL-MCNC: <0.015 UG/L (ref 0–0.04)
TROPONIN I SERPL-MCNC: <0.015 UG/L (ref 0–0.04)
WBC # BLD AUTO: 10.1 10E9/L (ref 4–11)

## 2018-11-01 PROCEDURE — 85520 HEPARIN ASSAY: CPT | Performed by: INTERNAL MEDICINE

## 2018-11-01 PROCEDURE — G0378 HOSPITAL OBSERVATION PER HR: HCPCS

## 2018-11-01 PROCEDURE — 25000132 ZZH RX MED GY IP 250 OP 250 PS 637: Performed by: PHYSICIAN ASSISTANT

## 2018-11-01 PROCEDURE — 36415 COLL VENOUS BLD VENIPUNCTURE: CPT | Performed by: INTERNAL MEDICINE

## 2018-11-01 PROCEDURE — 93005 ELECTROCARDIOGRAM TRACING: CPT

## 2018-11-01 PROCEDURE — 93321 DOPPLER ECHO F-UP/LMTD STD: CPT | Mod: 26 | Performed by: INTERNAL MEDICINE

## 2018-11-01 PROCEDURE — 21000001 ZZH R&B HEART CARE

## 2018-11-01 PROCEDURE — 99233 SBSQ HOSP IP/OBS HIGH 50: CPT | Performed by: INTERNAL MEDICINE

## 2018-11-01 PROCEDURE — 25000128 H RX IP 250 OP 636: Performed by: PHYSICIAN ASSISTANT

## 2018-11-01 PROCEDURE — 85027 COMPLETE CBC AUTOMATED: CPT | Performed by: INTERNAL MEDICINE

## 2018-11-01 PROCEDURE — 25000132 ZZH RX MED GY IP 250 OP 250 PS 637: Performed by: INTERNAL MEDICINE

## 2018-11-01 PROCEDURE — 40000264 ECHO LIMITED WITH OPTISON

## 2018-11-01 PROCEDURE — 00000146 ZZHCL STATISTIC GLUCOSE BY METER IP

## 2018-11-01 PROCEDURE — C9113 INJ PANTOPRAZOLE SODIUM, VIA: HCPCS | Performed by: INTERNAL MEDICINE

## 2018-11-01 PROCEDURE — 25500064 ZZH RX 255 OP 636: Performed by: INTERNAL MEDICINE

## 2018-11-01 PROCEDURE — 40000264 ECHO COMPLETE WITH OPTISON

## 2018-11-01 PROCEDURE — 25000128 H RX IP 250 OP 636: Performed by: INTERNAL MEDICINE

## 2018-11-01 PROCEDURE — 84484 ASSAY OF TROPONIN QUANT: CPT | Performed by: INTERNAL MEDICINE

## 2018-11-01 PROCEDURE — 80048 BASIC METABOLIC PNL TOTAL CA: CPT | Performed by: INTERNAL MEDICINE

## 2018-11-01 PROCEDURE — 93010 ELECTROCARDIOGRAM REPORT: CPT | Performed by: INTERNAL MEDICINE

## 2018-11-01 PROCEDURE — 93308 TTE F-UP OR LMTD: CPT | Mod: 26 | Performed by: INTERNAL MEDICINE

## 2018-11-01 PROCEDURE — 25000131 ZZH RX MED GY IP 250 OP 636 PS 637: Performed by: INTERNAL MEDICINE

## 2018-11-01 PROCEDURE — 93325 DOPPLER ECHO COLOR FLOW MAPG: CPT | Mod: 26 | Performed by: INTERNAL MEDICINE

## 2018-11-01 PROCEDURE — 99222 1ST HOSP IP/OBS MODERATE 55: CPT | Mod: 25 | Performed by: INTERNAL MEDICINE

## 2018-11-01 RX ORDER — METOPROLOL SUCCINATE 100 MG/1
100 TABLET, EXTENDED RELEASE ORAL DAILY
Status: DISCONTINUED | OUTPATIENT
Start: 2018-11-02 | End: 2018-11-02 | Stop reason: HOSPADM

## 2018-11-01 RX ORDER — SODIUM CHLORIDE 9 MG/ML
INJECTION, SOLUTION INTRAVENOUS CONTINUOUS
Status: ACTIVE | OUTPATIENT
Start: 2018-11-01 | End: 2018-11-01

## 2018-11-01 RX ORDER — METOPROLOL TARTRATE 50 MG
50 TABLET ORAL 2 TIMES DAILY
Status: DISCONTINUED | OUTPATIENT
Start: 2018-11-01 | End: 2018-11-01

## 2018-11-01 RX ORDER — IBUPROFEN 600 MG/1
600 TABLET, FILM COATED ORAL ONCE
Status: COMPLETED | OUTPATIENT
Start: 2018-11-01 | End: 2018-11-01

## 2018-11-01 RX ORDER — IBUPROFEN 600 MG/1
600 TABLET, FILM COATED ORAL 3 TIMES DAILY
Status: DISCONTINUED | OUTPATIENT
Start: 2018-11-01 | End: 2018-11-01

## 2018-11-01 RX ADMIN — INSULIN ASPART 1 UNITS: 100 INJECTION, SOLUTION INTRAVENOUS; SUBCUTANEOUS at 21:28

## 2018-11-01 RX ADMIN — BUPROPION HYDROCHLORIDE 100 MG: 100 TABLET, FILM COATED, EXTENDED RELEASE ORAL at 09:02

## 2018-11-01 RX ADMIN — NITROGLYCERIN 0.4 MG: 0.4 TABLET SUBLINGUAL at 04:09

## 2018-11-01 RX ADMIN — IBUPROFEN 600 MG: 600 TABLET ORAL at 13:35

## 2018-11-01 RX ADMIN — METOPROLOL TARTRATE 50 MG: 50 TABLET ORAL at 09:02

## 2018-11-01 RX ADMIN — ONDANSETRON 4 MG: 2 INJECTION INTRAMUSCULAR; INTRAVENOUS at 13:09

## 2018-11-01 RX ADMIN — SODIUM CHLORIDE: 9 INJECTION, SOLUTION INTRAVENOUS at 11:34

## 2018-11-01 RX ADMIN — PANTOPRAZOLE SODIUM 40 MG: 40 INJECTION, POWDER, FOR SOLUTION INTRAVENOUS at 08:50

## 2018-11-01 RX ADMIN — NITROGLYCERIN 0.4 MG: 0.4 TABLET SUBLINGUAL at 04:01

## 2018-11-01 RX ADMIN — ONDANSETRON 4 MG: 2 INJECTION INTRAMUSCULAR; INTRAVENOUS at 18:17

## 2018-11-01 RX ADMIN — ATORVASTATIN CALCIUM 20 MG: 20 TABLET, FILM COATED ORAL at 09:02

## 2018-11-01 RX ADMIN — CLOPIDOGREL 75 MG: 75 TABLET, FILM COATED ORAL at 20:45

## 2018-11-01 RX ADMIN — HUMAN ALBUMIN MICROSPHERES AND PERFLUTREN 9 ML: 10; .22 INJECTION, SOLUTION INTRAVENOUS at 12:38

## 2018-11-01 RX ADMIN — BUPROPION HYDROCHLORIDE 100 MG: 100 TABLET, FILM COATED, EXTENDED RELEASE ORAL at 20:45

## 2018-11-01 RX ADMIN — FLUTICASONE FUROATE AND VILANTEROL TRIFENATATE 1 PUFF: 100; 25 POWDER RESPIRATORY (INHALATION) at 09:03

## 2018-11-01 RX ADMIN — NIFEDIPINE 30 MG: 30 TABLET, FILM COATED, EXTENDED RELEASE ORAL at 09:03

## 2018-11-01 RX ADMIN — HYDROCODONE BITARTRATE AND ACETAMINOPHEN 1 TABLET: 5; 325 TABLET ORAL at 04:16

## 2018-11-01 RX ADMIN — ALBUTEROL SULFATE 2 PUFF: 90 AEROSOL, METERED RESPIRATORY (INHALATION) at 22:54

## 2018-11-01 RX ADMIN — DULOXETINE HYDROCHLORIDE 60 MG: 30 CAPSULE, DELAYED RELEASE ORAL at 20:45

## 2018-11-01 RX ADMIN — VITAMIN D, TAB 1000IU (100/BT) 2000 UNITS: 25 TAB at 09:02

## 2018-11-01 RX ADMIN — HYDROCODONE BITARTRATE AND ACETAMINOPHEN 1 TABLET: 5; 325 TABLET ORAL at 09:10

## 2018-11-01 ASSESSMENT — ACTIVITIES OF DAILY LIVING (ADL)
ADLS_ACUITY_SCORE: 16

## 2018-11-01 ASSESSMENT — PAIN DESCRIPTION - DESCRIPTORS
DESCRIPTORS: PRESSURE
DESCRIPTORS: PRESSURE

## 2018-11-01 NOTE — ED NOTES
"[facility]  ED Nurse Handoff Report    ED Chief complaint: Chest Pain (started today,given 1 nitro at clinic and sent to ED for eval)      ED Diagnosis:   Final diagnoses:   Chest pain, unspecified type       Code Status: Full Code    Allergies:   Allergies   Allergen Reactions     Aspirin Hives     Reaction occurred during childhood.      Metformin      Elevated lactic acid     Minocycline      Yellow Dye Allergy. Minocycline has Yellow Dye #10.     Yellow Dye Hives     Rxn to yellow tablet. Eyes swelled shut.        Activity level - Baseline/Home:  Independent    Activity Level - Current:   Stand with Assist     Needed?: No    Isolation: No  Infection: Not Applicable  Bariatric?: Yes    Vital Signs:   Vitals:    10/31/18 1751 10/31/18 1800 10/31/18 1835 10/31/18 1900   BP: (!) 142/101   161/85   Pulse: 91      Resp: 22 16 24 18   Temp: 99.1  F (37.3  C)      TempSrc: Oral      SpO2: 95% 94% 92% 93%   Weight: 124.3 kg (274 lb)      Height: 1.702 m (5' 7\")          Cardiac Rhythm: ,        Pain level: 0-10 Pain Scale: 8    Is this patient confused?: No   Reading - Suicide Severity Rating Scale Completed?  Yes  If yes, what color did the patient score?  White    Patient Report: Initial Complaint:  Chest pain  Focused Assessment:  Patient complaints of chest pain.  States pain has decreased with medications given.   Tests Performed:  Labs, Imaging  Abnormal Results:    Labs Ordered and Resulted from Time of ED Arrival Up to the Time of Departure from the ED   COMPREHENSIVE METABOLIC PANEL - Abnormal; Notable for the following:        Result Value    Glucose 136 (*)     Creatinine 1.32 (*)     GFR Estimate 38 (*)     GFR Estimate If Black 46 (*)     All other components within normal limits   D DIMER QUANTITATIVE - Abnormal; Notable for the following:     D Dimer 0.7 (*)     All other components within normal limits   CBC WITH PLATELETS DIFFERENTIAL   LIPASE   TROPONIN I     Treatments " provided: IV insertion.  Medications (refer to MAR)    Family Comments:  None present     OBS brochure/video discussed/provided to patient/family: N/A              Name of person given brochure if not patient:               Relationship to patient:     ED Medications:   Medications   sucralfate (CARAFATE) suspension 2 g (not administered)   HYDROmorphone (PF) (DILAUDID) injection 0.5 mg (not administered)   morphine (PF) injection 4 mg (4 mg Intravenous Given 10/31/18 1759)   lidocaine (viscous) (XYLOCAINE) 2 % solution 10 mL (10 mLs Mouth/Throat Given 10/31/18 1758)   lidocaine (viscous) (XYLOCAINE) 2 % solution 10 mL (10 mLs Mouth/Throat Given 10/31/18 2000)   nitroGLYcerin (NITROSTAT) sublingual tablet 0.4 mg (0.4 mg Sublingual Given 10/31/18 2001)       Drips infusing?:  No    For the majority of the shift this patient was Green.   Interventions performed were monitor, assess.    Severe Sepsis OR Septic Shock Diagnosis Present: No    To be done/followed up on inpatient unit:      ED NURSE PHONE NUMBER: *[phone]     \

## 2018-11-01 NOTE — PROGRESS NOTES
[facility]    Hospitalist Progress Note      Assessment & Plan   Moira Andre is a 85 year old female with a past medical history of coronary artery disease s/p PCI, ELIGIO on CPAP, OA, obesity, hypertension, type 2 diabetes, diastolic CHF, CKD, and asthma who was admitted on 10/31/2018 after presenting with chest pain.     Atypical chest pain. Etiology unclear at this time. Describes pain as pressure with sharp pain radiating to her neck. Pain is worse laying flat, better sitting up, and with movement. Non-tender to palpation. Troponin is negative x3 and coronary angiography from 2017 with patent stents. Cardiology evaluated patient and did not feel her pain was cardiac. ECHO repeated and without WMA or reduced EF. No pericardial effusion seen. Overall pain sounds pleuritic and is most consistent with an inflammatory process. CT negative for PE. She did have some nausea and vomiting with meal earlier today. LFTs and lipase are unremarkable. She says she was told by Rheumatology in the past that she does not have RA. Pain slightly improved from this morning but still persistently an 8/10 and patient unable to lay down.   --stop heparin  --ibuprofen x 1 this afternoon, slight improvement but still rates 8/10 and unable to lay down.  Unable to schedule this due to her kidney disease (K is 5)  --will consider inflammatory markers, prednisone tomorrow if still not improved though cautious use of steroids in light of her diabetes  --if further issues with meals tonight will repeat lipase and consider abdominal imaging (she is s/p cherry)  --continue PPI  --continue tylenol    Type 2 Diabetes Mellitus. On glimepiride PTA.  --hold PTA meds  --sliding scale insulin as needed    Coronary artery disease.  Diastolic CHF   Again doubt pain is cardiac in nature. EKG without acute ischemia. She appears euvolemic. She is not on any diuresis PTA.   --continue prior to admission metoprolol, plavix (she is on this due  to aspirin allergy), lisinopril, statin    Chronic kidney disease, stage III. Cr stable at 1.3 which is around her baseline.   --cautious use of NSAID  --bmp in am    Hypertension.   --Continue prior to admission nifedipine, metoprolol  --Linsinopril on hold today, will resume tomorrow if kidney function and K stable    Depression and anxiety. Continue prior to admission Wellbutrin and Cymbalta    ELIGIO. CPAP    DVT Prophylaxis: Pneumatic Compression Devices  Code Status: Full Code    Disposition: Expected discharge in 1-2 days pending improvement in pain, further workup    This patient was seen and examined with Dr. Payton who agrees with the above plan.    Marie Wright PA-C    Interval History   Patient continues to be uncomfortable this afternoon. Nausea and vomiting this afternoon after meal. Pain is 8/10, worsens when she tries to lay flat. Feels SOB but feels this is because she is unable to take deep breaths without pain worsening. No dizziness.     -Data reviewed today: I reviewed all new labs and imaging results over the last 24 hours. I personally reviewed no images or EKG's today.    Physical Exam   Temp: 98.7  F (37.1  C) Temp src: Oral BP: 133/80 Pulse: 100 Heart Rate: 79 Resp: 18 SpO2: 91 % O2 Device: None (Room air) Oxygen Delivery: 2 LPM  Vitals:    10/31/18 1751 10/31/18 2159 11/01/18 0500   Weight: 124.3 kg (274 lb) 128.8 kg (283 lb 15.2 oz) 127.5 kg (281 lb)     Vital Signs with Ranges  Temp:  [97.8  F (36.6  C)-99.1  F (37.3  C)] 98.7  F (37.1  C)  Pulse:  [] 100  Heart Rate:  [78-92] 79  Resp:  [16-24] 18  BP: (128-187)/() 133/80  SpO2:  [91 %-96 %] 91 %  I/O last 3 completed shifts:  In: 200 [P.O.:200]  Out: -     Constitutional: Alert and oriented, sitting up in bed. Tearful at times. Appears uncomfortable. She is appropriately conversant.   ENT: normal cephalic, moist mucous membranes  Respiratory: Lungs clear to auscultation bilaterally, no increased work of breathing or  wheezing  Cardiovascular: Regular rate and rhythm, systolic murmur, no significant LE edema, distal pulses +2/4  GI: active bowel sounds, abdomen soft, non-tender. Large abdominal hernia, non-tender   Skin/Integumen: warm, dry   MSK:  Moves all four extremities. Chest wall and neck are non-tender to palpation. Pain exacerbated with movement of upper body  Neuro:  Cranial nerves 2-12 grossly intact. No focal deficits.       Medications     - MEDICATION INSTRUCTIONS -       - MEDICATION INSTRUCTIONS -       Reason ACE/ARB/ARNI order not selected       sodium chloride 50 mL/hr at 11/01/18 1134       atorvastatin  20 mg Oral Daily     buPROPion  100 mg Oral BID     cholecalciferol  2,000 Units Oral Daily     clopidogrel  75 mg Oral QPM     DULoxetine  60 mg Oral QPM     fluticasone-vilanterol  1 puff Inhalation Daily     insulin aspart  1-7 Units Subcutaneous TID AC     insulin aspart  1-5 Units Subcutaneous At Bedtime     [START ON 11/2/2018] metoprolol succinate  100 mg Oral Daily     metoprolol tartrate  50 mg Oral BID     NIFEdipine ER  30 mg Oral Daily     pantoprazole (PROTONIX) IV  40 mg Intravenous Daily with breakfast     sodium chloride (PF)  3 mL Intracatheter Q8H       Data     Recent Labs  Lab 11/01/18  1242 11/01/18  0345 10/31/18  2255 10/31/18  1755   WBC  --  10.1 11.7* 10.6   HGB  --  11.6* 12.1 12.2   MCV  --  87 86 86   PLT  --  258 270 271   NA  --  136  --  138   POTASSIUM  --  5.0  --  4.2   CHLORIDE  --  105  --  106   CO2  --  25  --  23   BUN  --  20  --  21   CR  --  1.35*  --  1.32*   ANIONGAP  --  6  --  9   TELLY  --  8.2*  --  8.8   GLC  --  132*  --  136*   ALBUMIN  --   --   --  3.5   PROTTOTAL  --   --   --  7.5   BILITOTAL  --   --   --  0.3   ALKPHOS  --   --   --  64   ALT  --   --   --  16   AST  --   --   --  11   LIPASE  --   --   --  203   TROPI <0.015 <0.015 <0.015 <0.015       Recent Results (from the past 24 hour(s))   XR Chest 2 Views    Narrative    CHEST TWO VIEWS     10/31/2018 6:12 PM     HISTORY: Pleuritic chest pain.     COMPARISON: 1/7/2016.    FINDINGS: Minimal fibrosis both bases, unchanged in the interval.  Nothing clearly acute.      Impression    IMPRESSION: Nothing clearly acute. No interval change.    JULISA CLEMENT MD   CT Chest Pulmonary Embolism w Contrast    Narrative    CT CHEST PULMONARY EMBOLISM W CONTRAST  10/31/2018 11:20 PM    HISTORY: Chest pain, shortness of breath.    TECHNIQUE: Scans obtained from the apices through the diaphragm with  IV contrast. 83 mL Isovue-370 injected. Radiation dose for this scan  was reduced using automated exposure control, adjustment of the mA  and/or kV according to patient size, or iterative reconstruction  technique.    COMPARISON: None.    FINDINGS: Evaluation of the pulmonary arterial system shows no  evidence of embolus. There is no aortic aneurysm or dissection. There  are coronary artery atherosclerotic calcifications. The heart size is  normal. There are mitral valve calcifications. No mediastinal, hilar  or axillary lymph node enlargement. There is dependent atelectasis  bilaterally. Mild scarring at the lung bases and apices. No  pneumothorax or pleural effusion. Images through the upper abdomen  show no acute abnormalities. There are bilateral renal cysts. There is  degenerative disease in the spine.      Impression    IMPRESSION:  1. There is no pulmonary embolus, aortic aneurysm or dissection.  2. Coronary artery atherosclerotic calcifications.    LEONCIO LANGFORD MD

## 2018-11-01 NOTE — H&P
[facility]    History and Physical  Hospitalist       Date of Admission:  10/31/2018    Assessment & Plan     This is an 85-year-old female with history of coronary artery disease, status post PCI in the past, obstructive sleep apnea on CPAP, rheumatoid arthritis, obesity, osteoarthritis, hypertension, diabetes, diastolic dysfunction, chronic kidney disease stage III, asthma, obesity, comes to the ER with complaint of chest pain.      ASSESSMENT AND PLAN:   1.  Chest pain:  The patient's atypical chest pain which is more pleuritic and reproducible at one side and then she has some elements of pressure and radiation to the neck as well with some shortness of breath.  Given her comorbidities, we need to rule out any acute coronary syndromes.  Mildly elevated D-dimer and pleuritic chest, mostly on the right side, so I will go ahead and do the CTA of the chest, PE protocol, keep her on IV heparin, do serial cardiac enzymes, echocardiogram, consult Cardiology, and we will see how she does.  Initial EKG is unchanged from her previous and does not show any acute ischemic changes.  Initial troponin has been negative as well.   2.  Asthma.  I will continue her inhalers.  She takes albuterol nebulizer and Advair.  We will continue with that.   3.  Hypertension, slightly on the higher side.  She did not take any of her medications.  She is on lisinopril and nifedipine for that.  I will hold the lisinopril for now as she will be going to get the dye.  We will keep on nifedipine 30 mg daily.  I will start her on metoprolol 50 mg b.i.d.  As given a history of coronary artery disease and stents in the past she needs to be on the beta blocker   4.  Obstructive sleep apnea on CPAP.  We will continue that while she is in the hospital.   5.  Diabetes mellitus type 2.  She takes glimepiride.  I will hold that while she is in the hospital and keep her on sliding scale insulin for correction hypoglycemia protocol.   6.   Chronic kidney disease, stage III.  Creatinine 1.32, stable.  We will keep on IV fluid for a few hours.  Hold the Lasix and lisinopril as well.   7.  Hyperlipidemia.  Lipitor, we will continue with that.     8.  GERD.  History of GERD and she has some reflux as well.  She was given a GI cocktail in the ER without much benefit.  I will keep her on IV Protonix for now.     9.  DVT prophylaxis with IV heparin.      CODE STATUS:  I have a detailed discussion with the patient regarding her code status, and she wanted to be full code at this time.      The case discussed with ER physician and the nursing staff taking care of the patient.         NICK DANIELS MD           DVT Prophylaxis: Heparin IV  Code Status: Full Code    Disposition: Expected discharge in 1-2 days once stable    Nick Daniels MD    Primary Care Physician   Maryan Churchill    Chief Complaint   Chest pain    History is obtained from the patient    History of Present Illness        This is an 85-year-old female with history of coronary artery disease, status post PCI in the past, obstructive sleep apnea on CPAP, rheumatoid arthritis, obesity, osteoarthritis, hypertension, diabetes, diastolic dysfunction, chronic kidney disease stage III, asthma, obesity, comes to the ER with complaint of chest pain.      According to the patient, she woke up around 6:00 a.m. and then lie down on the bed until 8:00 a.m. and was feeling fine.  She got up around 8:00 a.m., went ahead and had breakfast and was watching TV.  At around 9:30 to 10:00 a.m. in the morning she started feeling chest pain, which was pleuritic in nature, got worse with taking deep breath and radiated to her right jaw.  She described it as a pressure.  She had no headache, dizziness or lightheadedness, fever, chills or cough with that.  No exertion makes it worse.  It gets better when sitting up and worse with lying down.  It has been constant since then.  For that reason, she came to the urgent  care and subsequently transferred to the ER.      At this time, the patient is still complaining of pain, even though she got the nitroglycerin, morphine and still has the pain in her right side of the chest.  She rated about 7-8 by 10 on pain scale.  Mild shortness of breath, but she always has some shortness of breath because of her obesity and asthma.  No fever or chills at this time.  Denies any hemoptysis, hematemesis, melena or hematochezia at this time.     Past Medical History    I have reviewed this patient's medical history and updated it with pertinent information if needed.   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     Myocardial infarction (H) 9/2007, cath 2013 ml    BMS: stent to OM, diag, nl EF, echo /C angia 2013 , f/u cath no lesion >40%     Nephrolithiasis     right side     OA (osteoarthritis) of knee      Obesity      Rheumatoid arthritis flare (H)     prednisone     Sleep apnea     restarted using cpap 2017     TIA (transient ischaemic attack)      Ventral hernia, unspecified, without mention of obstruction or gangrene        Past Surgical History   I have reviewed this patient's surgical history and updated it with pertinent information if needed.  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 Taking?   ADVAIR DISKUS 100-50 MCG/DOSE diskus inhaler  Self No No   Sig: INHALE 1 PUFF EVERY 12 HOURS   Blood Glucose Monitoring Suppl (TRUE METRIX AIR GLUCOSE METER) W/DEVICE KIT  Self No No   Sig: USE AS DIRECTED   Cholecalciferol (VITAMIN D) 2000 UNITS CAPS  Self Yes No   Sig: Take by mouth daily   DULoxetine (CYMBALTA) 60 MG EC capsule   No No   Sig: Take 1 capsule (60 mg) by mouth daily   HYDROcodone-acetaminophen (NORCO) 5-325 MG per tablet  Self No No   Sig: Take 1 tablet by mouth every 8 hours as needed for moderate to severe pain   IBANdronate (BONIVA) 150 MG tablet  Self Yes No   Sig: Take 150 mg by mouth every 30 days Patient takes on the first day of each month.   Menthol, Topical Analgesic, (ICY HOT EX)  Self Yes No   Sig: Apply to affected area every morning   NIFEdipine ER osmotic (ADALAT CC) 30 MG TB24   No No   Sig: Take 1 tablet (30 mg) by mouth daily   Nitroglycerin (NITROQUICK SL)  Self Yes No   Sig: Place 0.4 mg under the tongue every 5 minutes as needed    TRUEPLUS LANCETS 28G MISC  Self No No   Si each 2 times daily as needed   acetaminophen (TYLENOL) 325 MG tablet  Self Yes No   Sig: Take 325-650 mg by mouth every 6 hours as needed for mild pain   albuterol (PROAIR HFA/PROVENTIL HFA/VENTOLIN HFA) 108 (90 BASE) MCG/ACT Inhaler  Self No No   Sig: Inhale 2 puffs into the lungs every 6 hours as needed for shortness of breath / dyspnea   atorvastatin (LIPITOR) 20 MG tablet  Self No No   Sig: Take 1  tablet (20 mg) by mouth daily   blood glucose monitoring (NO BRAND SPECIFIED) meter device kit  Self No No   Sig: Use to test blood sugar 1-2 times daily or as directed.   blood glucose monitoring (TRUE METRIX BLOOD GLUCOSE TEST) test strip  Self No No   Si strip by In Vitro route daily   buPROPion (WELLBUTRIN SR) 100 MG 12 hr tablet   No No   Sig: Take 1 tablet (100 mg) by mouth 2 times daily   clopidogrel (PLAVIX) 75 MG tablet   No No   Sig: Take 1 tablet (75 mg) by mouth daily   fluticasone (FLONASE) 50 MCG/ACT nasal spray  Self Yes No   Sig: Spray 1 spray into both nostrils as needed    furosemide (LASIX) 20 MG tablet   No No   Sig: Take 1 tablet (20 mg) by mouth daily as needed   glimepiride (AMARYL) 2 MG tablet   No No   Sig: Take 1 tablet (2 mg) by mouth every morning (before breakfast)   lisinopril (PRINIVIL/ZESTRIL) 20 MG tablet   Yes No   Sig: Take 1 tablet (20 mg) by mouth daily   neomycin-polymyxin-hydrocortisone (CORTISPORIN) otic solution  Self No No   Sig: Place 4 drops into both ears 4 times daily   Patient taking differently: Place 4 drops into both ears 4 times daily as needed (Ear infections from swimming)    nitroGLYcerin (NITROSTAT) 0.4 MG sublingual tablet   No No   Si tab po in clinic   nystatin-triamcinolone (MYCOLOG II) cream  Self No No   Sig: APPLY TOPICALLY TWICE DAILY   Patient taking differently: APPLY TOPICALLY TDaily   order for DME  Self Yes No   Sig: DREAMSTATION  5-15 CM/H20  NASAL WISP FABRIC   ranitidine (ZANTAC) 150 MG tablet   No No   Sig: Take 1 tablet (150 mg) by mouth 2 times daily      Facility-Administered Medications: None     Allergies   Allergies   Allergen Reactions     Aspirin Hives     Reaction occurred during childhood.      Metformin      Elevated lactic acid     Minocycline      Yellow Dye Allergy. Minocycline has Yellow Dye #10.     Yellow Dye Hives     Rxn to yellow tablet. Eyes swelled shut.        Social History   I have reviewed this patient's social  history and updated it with pertinent information if needed. Moira Andre  reports that she quit smoking about 45 years ago. Her smoking use included Cigarettes. She started smoking about 46 years ago. She smoked 0.25 packs per day. She has never used smokeless tobacco. She reports that she drinks alcohol. She reports that she does not use illicit drugs.    Family History   I have reviewed this patient's family history and updated it with pertinent information if needed.   Family History   Problem Relation Age of Onset     Neurologic Disorder Mother      MS - at 60's     C.A.D. Father       at 8o's, ? prostate ca     Breast Cancer No family hx of      Cancer - colorectal No family hx of        Review of Systems   CONSTITUTIONAL:  positive for  sweats and fatigue  EYES:  negative  HEENT:  negative  RESPIRATORY:  positive for  dry cough, dyspnea, chest pain and pleuritic pain  CARDIOVASCULAR:  positive for  chest pain  GASTROINTESTINAL:  positive for nausea and reflux  GENITOURINARY:  negative  INTEGUMENT/BREAST:  negative  HEMATOLOGIC/LYMPHATIC:  negative  ALLERGIC/IMMUNOLOGIC:  negative  ENDOCRINE:  negative  MUSCULOSKELETAL:  negative  NEUROLOGICAL:  negative  BEHAVIOR/PSYCH:  negative    Physical Exam   Temp: 99.1  F (37.3  C) Temp src: Oral BP: 161/85 Pulse: 91 Heart Rate: 90 Resp: 18 SpO2: 93 % O2 Device: None (Room air)    Vital Signs with Ranges  Temp:  [97.8  F (36.6  C)-99.1  F (37.3  C)] 99.1  F (37.3  C)  Pulse:  [] 91  Heart Rate:  [90-92] 90  Resp:  [16-24] 18  BP: (142-187)/() 161/85  SpO2:  [92 %-95 %] 93 %  274 lbs 0 oz    Constitutional: Awake, alert, cooperative, mild distress.  Eyes: Conjunctiva and pupils examined and normal.  HEENT: dry mucous membranes, normal dentition.  Respiratory: Clear to auscultation bilaterally, some audile wheezing, no crackles.  Right sided reproducible chest pain   Cardiovascular: Regular rate and rhythm, normal S1 and S2, and no murmur  noted.  GI: Soft, non-distended, non-tender, normal bowel sounds.  Lymph/Hematologic: No anterior cervical or supraclavicular adenopathy.  Skin: No rashes, no cyanosis, no edema.  Musculoskeletal: No joint swelling, erythema or tenderness.  Neurologic: Cranial nerves 2-12 intact, normal strength and sensation.  Psychiatric: Alert, oriented to person, place and time, no obvious anxiety or depression.    Data   Data reviewed today:  I personally reviewed the EKG tracing showing un changed from previous EKG and the chest xray no acute infiltrate or congestive changes.     Recent Labs  Lab 10/31/18  1755   WBC 10.6   HGB 12.2   MCV 86         POTASSIUM 4.2   CHLORIDE 106   CO2 23   BUN 21   CR 1.32*   ANIONGAP 9   TELLY 8.8   *   ALBUMIN 3.5   PROTTOTAL 7.5   BILITOTAL 0.3   ALKPHOS 64   ALT 16   AST 11   LIPASE 203   TROPI <0.015       Recent Results (from the past 24 hour(s))   XR Chest 2 Views    Narrative    CHEST TWO VIEWS    10/31/2018 6:12 PM     HISTORY: Pleuritic chest pain.     COMPARISON: 1/7/2016.    FINDINGS: Minimal fibrosis both bases, unchanged in the interval.  Nothing clearly acute.      Impression    IMPRESSION: Nothing clearly acute. No interval change.    JULISA CLEMENT MD

## 2018-11-01 NOTE — CONSULTS
[facility]  Cardiology Consultation     Date of Admission:  10/31/2018    Primary Care Physician   Maryan Churchill    Reason for Consult   Reason for consult: I was asked to evaluate this patient for atypical chest pain.    History of Present Illness   Moira Andre is a very nice 85 year old woman who follows with my partner Dr. Jozef Ngo.  Her past medical history is significant for morbid obesity, rheumatoid arthritis, osteoarthritis, obstructive sleep apnea on CPAP, hypertension, hyperlipidemia, diabetes mellitus, diastolic dysfunction, chronic renal insufficiency stage III and asthma.    Her cardiac history is significant for stenting of an obtuse marginal diagonal in 2007.  A mildly abnormal stress nuclear scan in 2017 with inferior ischemia led to coronary angiography.  Previously stents were widely patent she had a 50-60% circumflex stenosis that was not significant by FFR.  She had an echocardiogram performed in September of this year demonstrating ejection fraction of greater than 70%.  There were no wall motion abnormality.  She had a mild gradient across her mitral valve of 4 mmHg.     Patient states she was in her usual state of health until Wednesday morning.  About 11 AM she did start developing a chest discomfort she describes as a pressure radiating up into her jaw symptoms lasted all day.  Ultimately she went to urgent care to be evaluated and they sent her into the hospital for further evaluation treatment.  She describes this the chest pain as occasionally sharp in nature clearly worse lying down clearly worse with deep breath as a matter fact she winces when she takes a deep breath she states is much worse when she lays on her right side.  Troponins have all been undetectable.  EKG is unchanged from previous.  CT scan demonstrates no evidence of pulmonary embolus.    Assessment & Plan   Moira Andre is a 85 year old female who was admitted on 10/31/2018 with clearly a  pleuritic chest pain.  I do not think this represents coronary artery disease or angina.  I do not think we need any stress testing or coronary angiography.  She is already scheduled for an echocardiogram.  EKG demonstrates no evidence of pericarditis.  I suspect this is not cardiac in origin.  I suspect this is inflammatory chest pain.  I will discontinue heparin and start her empirically on Motrin 600 mg 3 times daily.  She has no symptoms to suggest a viral syndrome.  The echocardiogram may show a pericardial effusion but as stated I do not think this is cardiac in origin.    Blood pressures well controlled.    Mike thompson was checked in August and was excellent.    From a cardiac standpoint I would continue with her beta-blockers, ACE inhibitor, statin and Plavix.  She is not on aspirin due to an aspirin allergy.    If her pain response to Motrin I think she can be discharged later today.    Andrew Simpson MD    Patient Active Problem List   Diagnosis     CKD (chronic kidney disease) stage 3, GFR 30-59 ml/min (H)     Morbid obesity (H)     CAD (coronary artery disease)     Type 2 diabetes mellitus with diabetic nephropathy (H)     Transient cerebral ischemia     Hyperlipidemia LDL goal <70     Ventral hernia     Intermittent asthma     Moderate major depression (H)     ELIGIO on CPAP     Microalbuminuria     S/P total knee arthroplasty     OA (osteoarthritis) of knee     Anemia due to blood loss, acute     Health Care Home     Essential hypertension     Gastroesophageal reflux disease without esophagitis     Bilateral edema of lower extremity     Osteoarthritis     Rheumatoid arthritis involving both hands with negative rheumatoid factor (H)     High risk medication use     Anxiety     Other chronic pain     Controlled substance agreement signed     Edema of lower extremity     Nephrolithiasis     Kidney stone     Tremor     Atypical chest pain     ACS (acute coronary syndrome) (H)       Past Medical History    I have reviewed this patient's medical history and updated it with pertinent information if needed.   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     Myocardial infarction (H) 9/2007, cath 2013 ml    BMS: stent to OM, diag, nl EF, echo /C angia 2013 , f/u cath no lesion >40%     Nephrolithiasis     right side     OA (osteoarthritis) of knee      Obesity      Rheumatoid arthritis flare (H)     prednisone     Sleep apnea     restarted using cpap 2017     TIA (transient ischaemic attack)      Ventral hernia, unspecified, without mention of obstruction or gangrene        Past Surgical History   I have reviewed this patient's surgical history and updated it with pertinent information if needed.  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  Taking?   ADVAIR DISKUS 100-50 MCG/DOSE diskus inhaler 10/31/2018 at x1 Self No Yes   Sig: INHALE 1 PUFF EVERY 12 HOURS   Blood Glucose Monitoring Suppl (TRUE METRIX AIR GLUCOSE METER) W/DEVICE KIT  Self No No   Sig: USE AS DIRECTED   Cholecalciferol (VITAMIN D) 2000 UNITS CAPS 10/31/2018 at Unknown time Self Yes Yes   Sig: Take 2,000 Units by mouth daily    DULoxetine (CYMBALTA) 60 MG EC capsule 10/30/2018 at pm  No Yes   Sig: Take 1 capsule (60 mg) by mouth daily   Patient taking differently: Take 60 mg by mouth every evening    HYDROcodone-acetaminophen (NORCO) 5-325 MG per tablet  Self No Yes   Sig: Take 1 tablet by mouth every 8 hours as needed for moderate to severe pain   IBANdronate (BONIVA) 150 MG tablet 10/1/2018 at Unknown time Self Yes Yes   Sig: Take 150 mg by mouth every 30 days Patient takes on the first day of each month.   Menthol, Topical Analgesic, (ICY HOT EX) 10/31/2018 at Unknown time Self Yes Yes   Sig: Apply to affected area every morning   TRUEPLUS LANCETS 28G MISC  Self No No   Si each 2 times daily as needed   acetaminophen (TYLENOL) 325 MG tablet  Self Yes Yes   Sig: Take 325-650 mg by mouth every 6 hours as needed for mild pain   albuterol (PROAIR HFA/PROVENTIL HFA/VENTOLIN HFA) 108 (90 BASE) MCG/ACT Inhaler  Self No Yes   Sig: Inhale 2 puffs into the lungs every 6 hours as needed for shortness of breath / dyspnea   atorvastatin (LIPITOR) 20 MG tablet 10/31/2018 at Unknown time Self No Yes   Sig: Take 1 tablet (20 mg) by mouth daily   blood glucose monitoring (NO BRAND SPECIFIED) meter device kit  Self No No   Sig: Use to test blood sugar 1-2 times daily or as directed.   blood glucose monitoring (TRUE METRIX BLOOD GLUCOSE TEST) test strip  Self No No   Si strip by In Vitro route daily   buPROPion (WELLBUTRIN SR) 100 MG 12 hr tablet   No Yes   Sig: Take 1 tablet (100 mg) by mouth 2 times daily   clopidogrel (PLAVIX) 75 MG tablet 10/30/2018 at pm  No Yes   Sig: Take 1 tablet (75  mg) by mouth daily   Patient taking differently: Take 75 mg by mouth every evening    fluticasone (FLONASE) 50 MCG/ACT nasal spray  Self Yes Yes   Sig: Spray 1 spray into both nostrils daily as needed    glimepiride (AMARYL) 2 MG tablet   No Yes   Sig: Take 1 tablet (2 mg) by mouth every morning (before breakfast)   lisinopril (PRINIVIL/ZESTRIL) 20 MG tablet 10/31/2018 at Unknown time  Yes Yes   Sig: Take 1 tablet (20 mg) by mouth daily   metoprolol succinate (TOPROL-XL) 100 MG 24 hr tablet 10/31/2018 at Unknown time  Yes Yes   Sig: Take 100 mg by mouth daily   neomycin-polymyxin-hydrocortisone (CORTISPORIN) otic solution  Self No Yes   Sig: Place 4 drops into both ears 4 times daily   Patient taking differently: Place 4 drops into both ears 4 times daily as needed (Ear infections from swimming)    nitroGLYcerin (NITROSTAT) 0.4 MG sublingual tablet   No Yes   Si tab po in clinic   nystatin-triamcinolone (MYCOLOG II) cream  Self No Yes   Sig: APPLY TOPICALLY TWICE DAILY   Patient taking differently: APPLY TOPICALLY DAILY PRN   order for DME  Self Yes No   Sig: DREAMSTATION  5-15 CM/H20  NASAL WISP FABRIC   ranitidine (ZANTAC) 150 MG tablet   No Yes   Sig: Take 1 tablet (150 mg) by mouth 2 times daily   Patient taking differently: Take 150 mg by mouth 2 times daily as needed       Facility-Administered Medications: None     Current Facility-Administered Medications   Medication Dose Route Frequency     atorvastatin  20 mg Oral Daily     buPROPion  100 mg Oral BID     cholecalciferol  2,000 Units Oral Daily     clopidogrel  75 mg Oral QPM     DULoxetine  60 mg Oral QPM     fluticasone-vilanterol  1 puff Inhalation Daily     ibuprofen  600 mg Oral TID     insulin aspart  1-7 Units Subcutaneous TID AC     insulin aspart  1-5 Units Subcutaneous At Bedtime     metoprolol tartrate  50 mg Oral BID     NIFEdipine ER  30 mg Oral Daily     pantoprazole (PROTONIX) IV  40 mg Intravenous Daily with breakfast     sodium  "chloride (PF)  3 mL Intracatheter Q8H     Current Facility-Administered Medications   Medication Last Rate     - MEDICATION INSTRUCTIONS -       - MEDICATION INSTRUCTIONS -       Reason ACE/ARB/ARNI order not selected       Allergies   Allergies   Allergen Reactions     Aspirin Hives     Reaction occurred during childhood.      Metformin      Elevated lactic acid     Minocycline      Yellow Dye Allergy. Minocycline has Yellow Dye #10.     Yellow Dye Hives     Rxn to yellow tablet. Eyes swelled shut.        Social History    reports that she quit smoking about 45 years ago. Her smoking use included Cigarettes. She started smoking about 46 years ago. She smoked 0.25 packs per day. She has never used smokeless tobacco. She reports that she drinks alcohol. She reports that she does not use illicit drugs.    Family History   Family History   Problem Relation Age of Onset     Neurologic Disorder Mother      MS - at 60's     C.A.D. Father       at 8o's, ? prostate ca     Breast Cancer No family hx of      Cancer - colorectal No family hx of        Review of Systems   The comprehensive 10 point Review of Systems is negative other than noted in the HPI or here.     Physical Exam   Vital Signs with Ranges  Temp:  [97.8  F (36.6  C)-99.1  F (37.3  C)] 97.8  F (36.6  C)  Pulse:  [] 100  Heart Rate:  [78-92] 89  Resp:  [16-24] 18  BP: (128-187)/() 130/73  SpO2:  [92 %-96 %] 95 %  Wt Readings from Last 4 Encounters:   18 127.5 kg (281 lb)   10/04/18 128.4 kg (283 lb)   18 127 kg (280 lb)   18 129.3 kg (285 lb)     I/O last 3 completed shifts:  In: 200 [P.O.:200]  Out: -       Vitals: /73 (BP Location: Left arm)  Pulse 100  Temp 97.8  F (36.6  C) (Oral)  Resp 18  Ht 1.702 m (5' 7\")  Wt 127.5 kg (281 lb)  SpO2 95%  BMI 44.01 kg/m2    Patient is uncomfortable and at times tearful because of the chest pain. Awake, alert and oriented ×3  Pupils are equal round and reactive.  Sclerae " anicteric conjunctiva is noninjected  Neck is supple there are no carotid bruits or jugular venous distention.  Chest is clear to auscultation.  There is no kyphosis or scoliosis  Cardiac exam reveals a regular rate and rhythm she has a 2/6 systolic ejection murmur best heard over the right upper sternal border.  Abdomen is obese, soft nontender with normoactive bowel sounds.  Extremities are without edema.  There are 2+ pulses.  Neurologic exam is nonfocal.  Skin is warm and dry.        Recent Labs  Lab 11/01/18  0345 10/31/18  2255 10/31/18  1755   TROPI <0.015 <0.015 <0.015         Recent Labs  Lab 11/01/18  0345 10/31/18  2255 10/31/18  1755   WBC 10.1 11.7* 10.6   HGB 11.6* 12.1 12.2   MCV 87 86 86    270 271     --  138   POTASSIUM 5.0  --  4.2   CHLORIDE 105  --  106   CO2 25  --  23   BUN 20  --  21   CR 1.35*  --  1.32*   GFRESTIMATED 37*  --  38*   GFRESTBLACK 45*  --  46*   ANIONGAP 6  --  9   TELLY 8.2*  --  8.8   *  --  136*   ALBUMIN  --   --  3.5   PROTTOTAL  --   --  7.5   BILITOTAL  --   --  0.3   ALKPHOS  --   --  64   ALT  --   --  16   AST  --   --  11   LIPASE  --   --  203   TROPI <0.015 <0.015 <0.015     Recent Labs   Lab Test  08/06/18   0936  04/03/18   1559   10/13/15   1008  03/11/15   1018   CHOL  149  174   < >  126  132   HDL  45*  44*   < >  54  53   LDL  46  74   < >  44  45   TRIG  290*  279*   < >  140  170*   CHOLHDLRATIO   --    --    --   2.3  2.5    < > = values in this interval not displayed.       Recent Labs  Lab 11/01/18  0345 10/31/18  2255 10/31/18  1755   WBC 10.1 11.7* 10.6   HGB 11.6* 12.1 12.2   HCT 36.9 38.7 38.6   MCV 87 86 86    270 271     No results for input(s): PH, PHV, PO2, PO2V, SAT, PCO2, PCO2V, HCO3, HCO3V in the last 168 hours.  No results for input(s): NTBNPI, NTBNP in the last 168 hours.    Recent Labs  Lab 10/31/18  1755   DD 0.7*     No results for input(s): SED, CRP in the last 168 hours.    Recent Labs  Lab 11/01/18  2776  10/31/18  2255 10/31/18  1755    270 271     No results for input(s): TSH in the last 168 hours.  No results for input(s): COLOR, APPEARANCE, URINEGLC, URINEBILI, URINEKETONE, SG, UBLD, URINEPH, PROTEIN, UROBILINOGEN, NITRITE, LEUKEST, RBCU, WBCU in the last 168 hours.    Imaging:  Recent Results (from the past 48 hour(s))   XR Chest 2 Views    Narrative    CHEST TWO VIEWS    10/31/2018 6:12 PM     HISTORY: Pleuritic chest pain.     COMPARISON: 1/7/2016.    FINDINGS: Minimal fibrosis both bases, unchanged in the interval.  Nothing clearly acute.      Impression    IMPRESSION: Nothing clearly acute. No interval change.    JULISA CLEMENT MD   CT Chest Pulmonary Embolism w Contrast    Narrative    CT CHEST PULMONARY EMBOLISM W CONTRAST  10/31/2018 11:20 PM    HISTORY: Chest pain, shortness of breath.    TECHNIQUE: Scans obtained from the apices through the diaphragm with  IV contrast. 83 mL Isovue-370 injected. Radiation dose for this scan  was reduced using automated exposure control, adjustment of the mA  and/or kV according to patient size, or iterative reconstruction  technique.    COMPARISON: None.    FINDINGS: Evaluation of the pulmonary arterial system shows no  evidence of embolus. There is no aortic aneurysm or dissection. There  are coronary artery atherosclerotic calcifications. The heart size is  normal. There are mitral valve calcifications. No mediastinal, hilar  or axillary lymph node enlargement. There is dependent atelectasis  bilaterally. Mild scarring at the lung bases and apices. No  pneumothorax or pleural effusion. Images through the upper abdomen  show no acute abnormalities. There are bilateral renal cysts. There is  degenerative disease in the spine.      Impression    IMPRESSION:  1. There is no pulmonary embolus, aortic aneurysm or dissection.  2. Coronary artery atherosclerotic calcifications.    LEONCIO LANGFORD MD       Echo:  Recent Results (from the past 4320 hour(s))   ECHO  COMPLETE WITH OPTISON    Narrative    [id number]  [facility]73  [id number]  [id number]^WENDY^ÓSCAR^JOSIE        [facility]  [facility]  Echocardiography Laboratory  [address]  [place] [address]  Phone [phone]  Fax [phone]        Name: JOSIE ARRIAGA  MRN: [id number]  : 1933  Study Date: 2018 10:06 AM  Age: 84 yrs  Gender: Female  [place] [facility] [place]  Reason For Study: Coronary artery disease  Ordering Physician: ÓSCAR NGUYEN  Referring Physician: GUI ERIC  Performed By: Julia Espinosa RDCS     BSA: 2.4 m2  Height: 67 in  Weight: 285 lb  HR: 78  BP: 140/80 mmHg  _____________________________________________________________________________  __     Procedure  Complete Echo Adult. Contrast Optison.     _____________________________________________________________________________  __        Interpretation Summary     Technically difficult study.     Hyperdynamic left ventricular functionThe visual ejection fraction is  estimated at >70%.  The right ventricular systolic function is normal.The right ventricle is  mildly dilated.  There is moderate mitral annular calcification.The mitral valve is not well  visualized. By doppler interrogation mild mitral stenosis with mean gradient 4  mm hg across mitral valve.     _____________________________________________________________________________  __        Left Ventricle  The left ventricle is normal in size. There is mild concentric left  ventricular hypertrophy. Diastolic function not assessed due to significant  mitral annular calcification. Hyperdynamic left ventricular function. The  visual ejection fraction is estimated at >70%. No regional wall motion  abnormalities noted.     Right Ventricle  The right ventricle is mildly dilated. The right ventricular systolic function  is normal.     Atria  The left atrium is borderline dilated. Right atrial size is normal. There  is  no color Doppler evidence of an atrial shunt.     Mitral Valve  There is moderate mitral annular calcification. The mitral valve is not well  visualized. There is trace mitral regurgitation. There is mild mitral  stenosis. mean gradient 4 mm hg across mitral valve.     Tricuspid Valve  There is trace tricuspid regurgitation. Right ventricular systolic pressure  could not be approximated due to inadequate tricuspid regurgitation.        Aortic Valve  The aortic valve is not well visualized. No aortic regurgitation is present.  No aortic stenosis is present.     Pulmonic Valve  The pulmonic valve is not well visualized.     Vessels  Normal size ascending aorta. The IVC is normal in size and reactivity with  respiration, suggesting normal central venous pressure.     Pericardium  There is no pericardial effusion.     _____________________________________________________________________________  __  MMode/2D Measurements & Calculations  IVSd: 1.1 cm  LVIDd: 4.6 cm  LVIDs: 3.3 cm  LVPWd: 1.3 cm  FS: 29.1 %  LV mass(C)d: 204.5 grams  LV mass(C)dI: 87.0 grams/m2  LA dimension: 3.6 cm  asc Aorta Diam: 3.0 cm  LVOT diam: 1.9 cm  LVOT area: 2.8 cm2  LA Volume (BP): 49.0 ml     LA Volume Index (BP): 20.9 ml/m2  RWT: 0.55        Doppler Measurements & Calculations  MV E max aeljandro: 69.7 cm/sec  MV A max alejandro: 136.0 cm/sec  MV E/A: 0.51  MV max P.0 mmHg  MV mean P.0 mmHg  MV V2 VTI: 37.3 cm  MV dec time: 0.34 sec  Ao V2 max: 182.0 cm/sec  Ao max P.0 mmHg  Ao V2 mean: 130.0 cm/sec  Ao mean P.0 mmHg  Ao V2 VTI: 31.6 cm  E/E' av.4  Lateral E/e': 8.2     Medial E/e': 10.6           _____________________________________________________________________________  __           Report approved by: Bere Rico 2018 11:43 AM

## 2018-11-01 NOTE — PLAN OF CARE
Problem: Patient Care Overview  Goal: Plan of Care/Patient Progress Review  CR/PT: Await cardiology consult and establishment of medical plan, will reschedule CR/PT evaluation.

## 2018-11-01 NOTE — PLAN OF CARE
Problem: Patient Care Overview  Goal: Plan of Care/Patient Progress Review  VSS. Alert & oriented. Tele: SR w/ 1AVB; HR 80s-90s. 2L O2 NC for comfort. Heparin @ 1150 units, NS @ 75 ml/hr. Ax1 w/ walker. C/o chest pain, EKG done, pain medication given. NPO since midnight. Plan: cards consult. Will continue to monitor.

## 2018-11-01 NOTE — H&P
Admitted:     10/31/2018      HISTORY OF PRESENT ILLNESS:  This is an 85-year-old female with history of coronary artery disease, status post PCI in the past, obstructive sleep apnea on CPAP, rheumatoid arthritis, obesity, osteoarthritis, hypertension, diabetes, diastolic dysfunction, chronic kidney disease stage III, asthma, obesity, comes to the ER with complaint of chest pain.      According to the patient, she woke up around 6:00 a.m. and then lie down on the bed until 8:00 a.m. and was feeling fine.  She got up around 8:00 a.m., went ahead and had breakfast and was watching TV.  At around 9:30 to 10:00 a.m. in the morning she started feeling chest pain, which was pleuritic in nature, got worse with taking deep breath and radiated to her right jaw.  She described it as a pressure.  She had no headache, dizziness or lightheadedness, fever, chills or cough with that.  No exertion makes it worse.  It gets better when sitting up and worse with lying down.  It has been constant since then.  For that reason, she came to the urgent care and subsequently transferred to the ER.      At this time, the patient is still complaining of pain, even though she got the nitroglycerin, morphine and still has the pain in her right side of the chest.  She rated about 7-8 by 10 on pain scale.  Mild shortness of breath, but she always has some shortness of breath because of her obesity and asthma.  No fever or chills at this time.  Denies any hemoptysis, hematemesis, melena or hematochezia at this time.      ASSESSMENT AND PLAN:   1.  Chest pain:  The patient's atypical chest pain which is more pleuritic and reproducible at one side and then she has some elements of pressure and radiation to the neck as well with some shortness of breath.  Given her comorbidities, we need to rule out any acute coronary syndromes.  Mildly elevated D-dimer and pleuritic chest, mostly on the right side, so I will go ahead and do the CTA of the chest, PE  protocol, keep her on IV heparin, do serial cardiac enzymes, echocardiogram, consult Cardiology, and we will see how she does.  Initial EKG is unchanged from her previous and does not show any acute ischemic changes.  Initial troponin has been negative as well.   2.  Asthma.  I will continue her inhalers.  She takes albuterol nebulizer and Advair.  We will continue with that.   3.  Hypertension, slightly on the higher side.  She did not take any of her medications.  She is on lisinopril and nifedipine for that.  I will hold the lisinopril for now as she will be going to get the dye.  We will keep on nifedipine 30 mg daily.  I will start her on metoprolol 50 mg b.i.d.  As given a history of coronary artery disease and stents in the past she needs to be on the beta blocker   4.  Obstructive sleep apnea on CPAP.  We will continue that while she is in the hospital.   5.  Diabetes mellitus type 2.  She takes glimepiride.  I will hold that while she is in the hospital and keep her on sliding scale insulin for correction hypoglycemia protocol.   6.  Chronic kidney disease, stage III.  Creatinine 1.32, stable.  We will keep on IV fluid for a few hours.  Hold the Lasix and lisinopril as well.   7.  Hyperlipidemia.  Lipitor, we will continue with that.     8.  GERD.  History of GERD and she has some reflux as well.  She was given a GI cocktail in the ER without much benefit.  I will keep her on IV Protonix for now.     9.  DVT prophylaxis with IV heparin.      CODE STATUS:  I have a detailed discussion with the patient regarding her code status, and she wanted to be full code at this time.      The case discussed with ER physician and the nursing staff taking care of the patient.         NATALY BOLAÑOS MD             D: 10/31/2018   T: 2018   MT: ENOCH      Name:     JOSIE ARRIAGA   MRN:      [id number]        Account:      [id number]   :      1933        Admitted:     10/31/2018                   Document:  [id number]

## 2018-11-01 NOTE — PHARMACY-ADMISSION MEDICATION HISTORY
Admission medication history interview status for the 10/31/2018  admission is complete. See EPIC admission navigator for prior to admission medications     Medication history source reliability:Good    Actions taken by pharmacist (provider contacted, etc): spoke to family, reviewed [facility], confirmed bupropion with [facility]     Additional medication history information not noted on PTA med list :None    Medication reconciliation/reorder completed by provider prior to medication history? Yes    Time spent in this activity: 20 minutes    Prior to Admission medications    Medication Sig Last Dose Taking? Auth Provider   acetaminophen (TYLENOL) 325 MG tablet Take 325-650 mg by mouth every 6 hours as needed for mild pain  Yes Reported, Patient   ADVAIR DISKUS 100-50 MCG/DOSE diskus inhaler INHALE 1 PUFF EVERY 12 HOURS 10/31/2018 at x1 Yes Maryan Churchill MD   albuterol (PROAIR HFA/PROVENTIL HFA/VENTOLIN HFA) 108 (90 BASE) MCG/ACT Inhaler Inhale 2 puffs into the lungs every 6 hours as needed for shortness of breath / dyspnea  Yes Maryan Churchill MD   atorvastatin (LIPITOR) 20 MG tablet Take 1 tablet (20 mg) by mouth daily 10/31/2018 at Unknown time Yes Maryan Churchill MD   buPROPion (WELLBUTRIN SR) 100 MG 12 hr tablet Take 1 tablet (100 mg) by mouth 2 times daily  Yes Maryan Churchill MD   Cholecalciferol (VITAMIN D) 2000 UNITS CAPS Take 2,000 Units by mouth daily  10/31/2018 at Unknown time Yes Reported, Patient   clopidogrel (PLAVIX) 75 MG tablet Take 1 tablet (75 mg) by mouth daily  Patient taking differently: Take 75 mg by mouth every evening  10/30/2018 at pm Yes Maryan Churchill MD   DULoxetine (CYMBALTA) 60 MG EC capsule Take 1 capsule (60 mg) by mouth daily  Patient taking differently: Take 60 mg by mouth every evening  10/30/2018 at pm Yes Maryan Churchill MD   fluticasone (FLONASE) 50 MCG/ACT nasal spray Spray 1 spray into both nostrils daily as needed   Yes Reported, Patient   glimepiride (AMARYL)  2 MG tablet Take 1 tablet (2 mg) by mouth every morning (before breakfast)  Yes Maryan Churchill MD   HYDROcodone-acetaminophen (NORCO) 5-325 MG per tablet Take 1 tablet by mouth every 8 hours as needed for moderate to severe pain  Yes Maryan Churchill MD   IBANdronate (BONIVA) 150 MG tablet Take 150 mg by mouth every 30 days Patient takes on the first day of each month. 10/1/2018 at Unknown time Yes Unknown, Entered By History   lisinopril (PRINIVIL/ZESTRIL) 20 MG tablet Take 1 tablet (20 mg) by mouth daily 10/31/2018 at Unknown time Yes Maryan Churchill MD   Menthol, Topical Analgesic, (ICY HOT EX) Apply to affected area every morning 10/31/2018 at Unknown time Yes Unknown, Entered By History   metoprolol succinate (TOPROL-XL) 100 MG 24 hr tablet Take 100 mg by mouth daily 10/31/2018 at Unknown time Yes Unknown, Entered By History   neomycin-polymyxin-hydrocortisone (CORTISPORIN) otic solution Place 4 drops into both ears 4 times daily  Patient taking differently: Place 4 drops into both ears 4 times daily as needed (Ear infections from swimming)   Yes Maryan Churchill MD   nitroGLYcerin (NITROSTAT) 0.4 MG sublingual tablet 1 tab po in clinic  Yes Leland Palacios,    nystatin-triamcinolone (MYCOLOG II) cream APPLY TOPICALLY TWICE DAILY  Patient taking differently: APPLY TOPICALLY DAILY PRN  Yes Maryna Churchill MD   ranitidine (ZANTAC) 150 MG tablet Take 1 tablet (150 mg) by mouth 2 times daily  Patient taking differently: Take 150 mg by mouth 2 times daily as needed   Yes Maryan Churchill MD   blood glucose monitoring (NO BRAND SPECIFIED) meter device kit Use to test blood sugar 1-2 times daily or as directed.   Maryan Churchill MD   blood glucose monitoring (TRUE METRIX BLOOD GLUCOSE TEST) test strip 1 strip by In Vitro route daily   Maryan Churchill MD   Blood Glucose Monitoring Suppl (TRUE METRIX AIR GLUCOSE METER) W/DEVICE KIT USE AS DIRECTED   Maryan Churchill MD   order for DME  DREAMSTATION  5-15 CM/H20  NASAL WISP FABRIC   Reported, Patient   TRUEPLUS LANCETS 28G MISC 1 each 2 times daily as needed   Maryan Churchill MD Beth Mulder, PharmD

## 2018-11-01 NOTE — PLAN OF CARE
Isa Lamar 24 y.o.  female presents for Annual Well Woman Exam.    ROS: Patient is feeling well. No dyspnea or chest pain on exertion. No Abdominal pain, change in bowel habits, black or bloody stools. No urinary sx. GYN ROS:no breast pain or new or enlarging lumps on self exam, no side effects of hormonal medications, no vaginal bleeding. Denies breast tenderness, mass, discharge, changes in size or contour, or abnormal cyclic discomfort. No neurological complaints.  No menses on nexplanon  Past Medical History:   Diagnosis Date   • Anemia    • Anxiety 2018   • Dysthymia 2016   • Infectious mononucleosis    • Nausea and vomiting in pregnancy 2013     Nexplanon  Allergy:   Nkda [no known drug allergy]    LMP:       No LMP recorded. .     Menstrual History: no menses yet  Contraceptive Method:Nexplanon 2015      Objective : The patient appears well, alert and oriented x 3, in no acute distress.  Blood pressure 112/70, height 1.524 m (5'), weight 78 kg (172 lb), not currently breastfeeding.  HEENT Exam: EOMI, ROSARIO, no adenopathy or thyromegaly.  Left medial upper arem : palpable Nexplanon   Lungs: Clear to Auscultation and Percussion.  Cor: S1 and S2 normal, no murmurs, or rubs   Abdomen: Soft without tenderness, guarding mass or organomegaly.  Extremities: No edema, pulses equal  Neurological: Normal No focal signs  Breast Exam:Inspection negative. No nipple discharge or bleeding. No masses or nodularity palpable  Pelvic: External genitalia,urethral meatus, urethra, bladder and vagina normal. Cervix, uterus and adnexa intact and normal.  Anus and perineum normal. Bimanual and rectovaginal without masses or tenderness.    Lab:No results found for this or any previous visit (from the past 336 hour(s)).    Assessment:  well woman  Needs Nexplanon removal /desires Re insertion     Plan:pap smear  return annually or prn  Self Breast Exams  Weight Bearing Exercise  Patient up to floor at 2200. VSS on RA. C/o 8/10 chest/jaw pain, no change in pain from initial complaint. LS coarse. Admission documentation complete. IVF and heparin infusions started, pt sent to CT for scan. Pt anxious at times, active listening effective.      Contraception:nexplanon

## 2018-11-01 NOTE — PROGRESS NOTES
RECEIVING UNIT ED HANDOFF REVIEW    ED Nurse Handoff Report was reviewed by: Quin Guajardo on October 31, 2018 at 9:41 PM

## 2018-11-01 NOTE — PLAN OF CARE
Problem: Patient Care Overview  Goal: Plan of Care/Patient Progress Review  Outcome: Improving  A&O. VSS. Tele SR w/1 avb.  Mod cho diet. Up with sba and walker. Denies pain. Patient nautious a couple times throughout day, PRN zofran given w/relief, 1 emesis.  Plan:  Continue incentive spirometer, ambulate.

## 2018-11-02 VITALS
OXYGEN SATURATION: 96 % | RESPIRATION RATE: 18 BRPM | HEART RATE: 100 BPM | WEIGHT: 280.2 LBS | HEIGHT: 67 IN | DIASTOLIC BLOOD PRESSURE: 65 MMHG | SYSTOLIC BLOOD PRESSURE: 137 MMHG | TEMPERATURE: 98.2 F | BODY MASS INDEX: 43.98 KG/M2

## 2018-11-02 LAB
ANION GAP SERPL CALCULATED.3IONS-SCNC: 5 MMOL/L (ref 3–14)
BUN SERPL-MCNC: 18 MG/DL (ref 7–30)
CALCIUM SERPL-MCNC: 8.8 MG/DL (ref 8.5–10.1)
CHLORIDE SERPL-SCNC: 108 MMOL/L (ref 94–109)
CO2 SERPL-SCNC: 25 MMOL/L (ref 20–32)
CREAT SERPL-MCNC: 1.32 MG/DL (ref 0.52–1.04)
GFR SERPL CREATININE-BSD FRML MDRD: 38 ML/MIN/1.7M2
GLUCOSE BLDC GLUCOMTR-MCNC: 105 MG/DL (ref 70–99)
GLUCOSE BLDC GLUCOMTR-MCNC: 96 MG/DL (ref 70–99)
GLUCOSE SERPL-MCNC: 110 MG/DL (ref 70–99)
HGB BLD-MCNC: 11.4 G/DL (ref 11.7–15.7)
POTASSIUM SERPL-SCNC: 4.2 MMOL/L (ref 3.4–5.3)
SODIUM SERPL-SCNC: 138 MMOL/L (ref 133–144)

## 2018-11-02 PROCEDURE — 25000132 ZZH RX MED GY IP 250 OP 250 PS 637: Performed by: INTERNAL MEDICINE

## 2018-11-02 PROCEDURE — 25000132 ZZH RX MED GY IP 250 OP 250 PS 637: Performed by: PHYSICIAN ASSISTANT

## 2018-11-02 PROCEDURE — G0378 HOSPITAL OBSERVATION PER HR: HCPCS

## 2018-11-02 PROCEDURE — 00000146 ZZHCL STATISTIC GLUCOSE BY METER IP

## 2018-11-02 PROCEDURE — 36415 COLL VENOUS BLD VENIPUNCTURE: CPT | Performed by: PHYSICIAN ASSISTANT

## 2018-11-02 PROCEDURE — 80048 BASIC METABOLIC PNL TOTAL CA: CPT | Performed by: PHYSICIAN ASSISTANT

## 2018-11-02 PROCEDURE — 85018 HEMOGLOBIN: CPT | Performed by: PHYSICIAN ASSISTANT

## 2018-11-02 PROCEDURE — 99238 HOSP IP/OBS DSCHRG MGMT 30/<: CPT | Performed by: INTERNAL MEDICINE

## 2018-11-02 RX ADMIN — METOPROLOL SUCCINATE 100 MG: 100 TABLET, EXTENDED RELEASE ORAL at 08:07

## 2018-11-02 RX ADMIN — ATORVASTATIN CALCIUM 20 MG: 20 TABLET, FILM COATED ORAL at 08:07

## 2018-11-02 RX ADMIN — VITAMIN D, TAB 1000IU (100/BT) 2000 UNITS: 25 TAB at 08:07

## 2018-11-02 RX ADMIN — BUPROPION HYDROCHLORIDE 100 MG: 100 TABLET, FILM COATED, EXTENDED RELEASE ORAL at 08:07

## 2018-11-02 RX ADMIN — FLUTICASONE FUROATE AND VILANTEROL TRIFENATATE 1 PUFF: 100; 25 POWDER RESPIRATORY (INHALATION) at 08:08

## 2018-11-02 RX ADMIN — NIFEDIPINE 30 MG: 30 TABLET, FILM COATED, EXTENDED RELEASE ORAL at 08:07

## 2018-11-02 ASSESSMENT — ACTIVITIES OF DAILY LIVING (ADL)
ADLS_ACUITY_SCORE: 16

## 2018-11-02 NOTE — PLAN OF CARE
Problem: Patient Care Overview  Goal: Plan of Care/Patient Progress Review  Outcome: Improving  A&Ox4. VSS on RA, 2L overnight via N/C, some HTN systolic up to 170 (lisinopril held due to kidney function). Denies pain, SOB. Reported pain relief from ibuprofen, will need clarification on frequency of ibuprofen today. Hospitalist stated unable to give TID due to kidneys but Cardiology still recommended, no current order for ibuprofen. Denies nausea. Good appetite intake 100% of dinner without nausea or emesis. Tele SR 1st degree AV block HR 70's. Mod-carb diet, BG monitoring. 1 unit given at bedtime. SBA/walker. Slight BLE edema +1. Incontinence at times. SL. Discharge home today. Will continue to monitor.

## 2018-11-02 NOTE — DISCHARGE SUMMARY
[facility]    Discharge Summary  Hospitalist    Date of Admission:  10/31/2018  Date of Discharge:  11/2/2018  Discharging Provider: Shanthi Payton MD  Date of Service (when I saw the patient): 11/02/18    Discharge Diagnoses    Atypical chest pain  Type II DM  CAD  Diastolic CHF  CKD-III  HTN  Depression/anxiety  ELIGIO    History of Present Illness   Moira Andre is a 85 year old female with a past medical history of coronary artery disease s/p PCI, ELIGIO on CPAP, OA, obesity, hypertension, type 2 diabetes, diastolic CHF, CKD, and asthma who was admitted on 10/31/2018 after presenting with chest pain. See H & P for detail.     Hospital Course   Moira Andre was admitted on 10/31/2018.  The following problems were addressed during her hospitalization:    Atypical chest pain.  Etiology unclear at this time. Describes pain in right side of chest as pressure with sharp pain radiating to her neck. Pain is worse laying flat and better sitting up. Non-tender to palpation. Lipase and LFT in normal range. Troponin is negative x3 and coronary angiography from 2017 with patent stents. Cardiology evaluated patient and did not feel her pain was cardiac. ECHO repeated and without WMA or reduced EF. No pericardial effusion seen. Overall pain sounds pleuritic and could be inflammatory process. CT negative for PE.She received 1 x dose of Ibuprofen with improvement in symptoms.  Given her underlying CKD, recommended she uses tylenol if recurrent pain. Otherwise, trial of low dose Ibuprofen sparingly.        Type 2 Diabetes Mellitus. On glimepiride, continued at discharge.      Coronary artery disease.  Diastolic CHF  EKG without acute ischemia. She appears euvolemic. She is not on any diuresis PTA.   --continue prior to admission metoprolol, plavix (she is on this due to aspirin allergy), lisinopril, statin     Chronic kidney disease, stage III. Cr stable at 1.3 which is around her baseline.     Hypertension.    --Continue prior to admission metoprolol and Lisinopril.       Depression and anxiety. Continue prior to admission Wellbutrin and Cymbalta     ELIGIO. CPAP    Shanthi Payton MD    Significant Results and Procedures    See epic for labs/imaging    Pending Results     Unresulted Labs Ordered in the Past 30 Days of this Admission     No orders found from 9/1/2018 to 11/1/2018.        Code Status   Full Code       Primary Care Physician   Maryan Churchill    Physical Exam   Temp: 98.2  F (36.8  C) Temp src: Oral BP: 137/65   Heart Rate: 79 Resp: 18 SpO2: 96 % O2 Device: None (Room air) Oxygen Delivery: 2 LPM  Vitals:    10/31/18 2159 11/01/18 0500 11/02/18 0200   Weight: 128.8 kg (283 lb 15.2 oz) 127.5 kg (281 lb) 127.1 kg (280 lb 3.2 oz)     Vital Signs with Ranges  Temp:  [97.8  F (36.6  C)-98.7  F (37.1  C)] 98.2  F (36.8  C)  Heart Rate:  [64-81] 79  Resp:  [18-20] 18  BP: (123-174)/() 137/65  SpO2:  [90 %-96 %] 96 %  I/O last 3 completed shifts:  In: 1346 [P.O.:1340; I.V.:6]  Out: 251 [Urine:250; Emesis/NG output:1]    General: alert,awake and no apparent distress  CVS: regular rate and rhythm  Resp: clear to auscultation bilaterally  Abd: soft, + large ventral hernia, non-tender    Discharge Disposition   Discharged to home  Condition at discharge: Stable    Consultations This Hospital Stay   CARDIAC REHAB IP CONSULT  CARDIOLOGY IP CONSULT  PHARMACY TO DOSE HEPARIN  SMOKING CESSATION PROGRAM IP CONSULT    Time Spent on this Encounter   IShanthi, personally saw the patient today and spent 30 minutes discharging this patient.    Discharge Orders     Follow-up and recommended labs and tests    Follow up with primary care provider, Maryan Churchill, within 5-7 days for hospital follow- up.  No follow up labs or test are needed.     Activity   Your activity upon discharge: activity as tolerated     Full Code     Diet   Follow this diet upon discharge: Regular       Discharge Medications    Current Discharge Medication List      CONTINUE these medications which have NOT CHANGED    Details   acetaminophen (TYLENOL) 325 MG tablet Take 325-650 mg by mouth every 6 hours as needed for mild pain      ADVAIR DISKUS 100-50 MCG/DOSE diskus inhaler INHALE 1 PUFF EVERY 12 HOURS  Qty: 3 Inhaler, Refills: 3    Associated Diagnoses: Intermittent asthma, uncomplicated      albuterol (PROAIR HFA/PROVENTIL HFA/VENTOLIN HFA) 108 (90 BASE) MCG/ACT Inhaler Inhale 2 puffs into the lungs every 6 hours as needed for shortness of breath / dyspnea  Qty: 1 Inhaler, Refills: 3    Associated Diagnoses: Intermittent asthma, uncomplicated      atorvastatin (LIPITOR) 20 MG tablet Take 1 tablet (20 mg) by mouth daily  Qty: 90 tablet, Refills: 3    Associated Diagnoses: Hyperlipidemia LDL goal <70      buPROPion (WELLBUTRIN SR) 100 MG 12 hr tablet Take 1 tablet (100 mg) by mouth 2 times daily  Qty: 60 tablet, Refills: 3    Comments: Make sure this does not have yellow dye  Associated Diagnoses: Moderate episode of recurrent major depressive disorder (H)      Cholecalciferol (VITAMIN D) 2000 UNITS CAPS Take 2,000 Units by mouth daily       clopidogrel (PLAVIX) 75 MG tablet Take 1 tablet (75 mg) by mouth daily  Qty: 90 tablet, Refills: 3    Associated Diagnoses: Coronary artery disease involving native coronary artery of native heart without angina pectoris      DULoxetine (CYMBALTA) 60 MG EC capsule Take 1 capsule (60 mg) by mouth daily  Qty: 90 capsule, Refills: 1    Comments: Profile Rx: patient will contact pharmacy when needed  Associated Diagnoses: Moderate major depression (H)      fluticasone (FLONASE) 50 MCG/ACT nasal spray Spray 1 spray into both nostrils daily as needed       glimepiride (AMARYL) 2 MG tablet Take 1 tablet (2 mg) by mouth every morning (before breakfast)  Qty: 90 tablet, Refills: 1    Associated Diagnoses: Type 2 diabetes mellitus with diabetic nephropathy, without long-term current use of insulin (H)       HYDROcodone-acetaminophen (NORCO) 5-325 MG per tablet Take 1 tablet by mouth every 8 hours as needed for moderate to severe pain  Qty: 90 tablet, Refills: 0    Associated Diagnoses: Multiple joint pain      IBANdronate (BONIVA) 150 MG tablet Take 150 mg by mouth every 30 days Patient takes on the first day of each month.      lisinopril (PRINIVIL/ZESTRIL) 20 MG tablet Take 1 tablet (20 mg) by mouth daily    Associated Diagnoses: Essential hypertension      Menthol, Topical Analgesic, (ICY HOT EX) Apply to affected area every morning      metoprolol succinate (TOPROL-XL) 100 MG 24 hr tablet Take 100 mg by mouth daily      neomycin-polymyxin-hydrocortisone (CORTISPORIN) otic solution Place 4 drops into both ears 4 times daily  Qty: 10 mL, Refills: 0    Associated Diagnoses: Left ear pain      nitroGLYcerin (NITROSTAT) 0.4 MG sublingual tablet 1 tab po in clinic  Qty: 1 tablet, Refills: 0    Associated Diagnoses: Chest pain, unspecified type; History of coronary artery disease; Type 2 diabetes mellitus with diabetic nephropathy, without long-term current use of insulin (H); Essential hypertension      nystatin-triamcinolone (MYCOLOG II) cream APPLY TOPICALLY TWICE DAILY  Qty: 60 g, Refills: 1    Associated Diagnoses: Tinea of the body      ranitidine (ZANTAC) 150 MG tablet Take 1 tablet (150 mg) by mouth 2 times daily  Qty: 60 tablet, Refills: 1    Associated Diagnoses: Dry cough      blood glucose monitoring (NO BRAND SPECIFIED) meter device kit Use to test blood sugar 1-2 times daily or as directed.  Qty: 1 kit, Refills: 0    Comments: [employer] True Metrix Air Meter  Associated Diagnoses: Type 2 diabetes mellitus with diabetic nephropathy (H)      blood glucose monitoring (TRUE METRIX BLOOD GLUCOSE TEST) test strip 1 strip by In Vitro route daily  Qty: 200 strip, Refills: 3    Associated Diagnoses: Type 2 diabetes mellitus with diabetic nephropathy, without long-term current use of insulin (H)      Blood Glucose  Monitoring Suppl (TRUE METRIX AIR GLUCOSE METER) W/DEVICE KIT USE AS DIRECTED  Qty: 1 kit, Refills: 0    Associated Diagnoses: Type 2 diabetes mellitus with diabetic nephropathy (H)      order for DME DREAMSTATION  5-15 CM/H20  NASAL WISP FABRIC      TRUEPLUS LANCETS 28G MISC 1 each 2 times daily as needed  Qty: 100 each, Refills: 3    Comments: Trueplus Super Think 28g lancets  Associated Diagnoses: Type 2 diabetes mellitus with diabetic nephropathy (H)         STOP taking these medications       NIFEdipine ER osmotic (ADALAT CC) 30 MG TB24 Comments:   Reason for Stopping:             Allergies   Allergies   Allergen Reactions     Aspirin Hives     Reaction occurred during childhood.      Metformin      Elevated lactic acid     Minocycline      Yellow Dye Allergy. Minocycline has Yellow Dye #10.     Yellow Dye Hives     Rxn to yellow tablet. Eyes swelled shut.      Data   Most Recent 3 CBC's:  Recent Labs   Lab Test  11/02/18   0600  11/01/18   0345  10/31/18   2255  10/31/18   1755   WBC   --   10.1  11.7*  10.6   HGB  11.4*  11.6*  12.1  12.2   MCV   --   87  86  86   PLT   --   258  270  271      Most Recent 3 BMP's:  Recent Labs   Lab Test  11/02/18   0600  11/01/18   0345  10/31/18   1755   NA  138  136  138   POTASSIUM  4.2  5.0  4.2   CHLORIDE  108  105  106   CO2  25  25  23   BUN  18  20  21   CR  1.32*  1.35*  1.32*   ANIONGAP  5  6  9   TELLY  8.8  8.2*  8.8   GLC  110*  132*  136*     Most Recent 2 LFT's:  Recent Labs   Lab Test  10/31/18   1755 08/22/18 2015   AST  11  13   ALT  16  17   ALKPHOS  64  62   BILITOTAL  0.3  0.3     Most Recent INR's and Anticoagulation Dosing History:  Anticoagulation Dose History     Recent Dosing and Labs Latest Ref Rng & Units 7/21/2007 9/25/2007 10/8/2007 10/12/2007 8/13/2013 9/21/2016 7/19/2017    INR 0.86 - 1.14 0.94 1.01 1.09 1.06 1.03 0.99 0.93        Most Recent 3 Troponin's:  Recent Labs   Lab Test  11/01/18   1242  11/01/18   0345  10/31/18   2255   TROP   <0.015  <0.015  <0.015     Most Recent Cholesterol Panel:  Recent Labs   Lab Test  08/06/18   0936   CHOL  149   LDL  46   HDL  45*   TRIG  290*     Most Recent 6 Bacteria Isolates From Any Culture (See EPIC Reports for Culture Details):  Recent Labs   Lab Test  08/24/18   0645  08/22/18   2227  08/22/18   2210  08/22/18 2015 07/04/18   0918   CULT  <10,000 colonies/mL  mixed urogenital tyler  Susceptibility testing not routinely done    >100,000 colonies/mL  mixed urogenital tyler    Susceptibility testing not routinely done  No growth  No growth  >100,000 colonies/mL  mixed urogenital tyler       Most Recent TSH, T4 and A1c Labs:  Recent Labs   Lab Test  10/31/18   2255   04/03/18   1559   TSH   --    --   2.11   A1C  7.0*   < >  7.2*    < > = values in this interval not displayed.     Results for orders placed or performed during the hospital encounter of 10/31/18   XR Chest 2 Views    Narrative    CHEST TWO VIEWS    10/31/2018 6:12 PM     HISTORY: Pleuritic chest pain.     COMPARISON: 1/7/2016.    FINDINGS: Minimal fibrosis both bases, unchanged in the interval.  Nothing clearly acute.      Impression    IMPRESSION: Nothing clearly acute. No interval change.    JULISA CLEMENT MD   CT Chest Pulmonary Embolism w Contrast    Narrative    CT CHEST PULMONARY EMBOLISM W CONTRAST  10/31/2018 11:20 PM    HISTORY: Chest pain, shortness of breath.    TECHNIQUE: Scans obtained from the apices through the diaphragm with  IV contrast. 83 mL Isovue-370 injected. Radiation dose for this scan  was reduced using automated exposure control, adjustment of the mA  and/or kV according to patient size, or iterative reconstruction  technique.    COMPARISON: None.    FINDINGS: Evaluation of the pulmonary arterial system shows no  evidence of embolus. There is no aortic aneurysm or dissection. There  are coronary artery atherosclerotic calcifications. The heart size is  normal. There are mitral valve calcifications. No  mediastinal, hilar  or axillary lymph node enlargement. There is dependent atelectasis  bilaterally. Mild scarring at the lung bases and apices. No  pneumothorax or pleural effusion. Images through the upper abdomen  show no acute abnormalities. There are bilateral renal cysts. There is  degenerative disease in the spine.      Impression    IMPRESSION:  1. There is no pulmonary embolus, aortic aneurysm or dissection.  2. Coronary artery atherosclerotic calcifications.    LEONCIO LANGFORD MD

## 2018-11-02 NOTE — PLAN OF CARE
Problem: Patient Care Overview  Goal: Plan of Care/Patient Progress Review  Outcome: Improving  A&Ox4. VSS on RA, 2L overnight for ELIGIO, some HTN systolic 159. Denies pain, SOB. Denies nausea. Good appetite intake 100% of dinner without nausea or emesis. Tele SR 1st degree AV block HR 70's. Mod-carb diet, BG monitoring. 1 unit given at bedtime. SBA/walker. Slight BLE edema +1. Incontinence at times. Discharge home tomorrow. Nurse [phone].

## 2018-11-02 NOTE — PROGRESS NOTES
Meets criteria for discharge. Friend providing transport. Discharge to home w/ assistance available from neighbors. Belongings accounted for and in pt possession. After visit summary reviewed, understood, and signed. Discharge education completed.

## 2018-11-03 LAB — INTERPRETATION ECG - MUSE: NORMAL

## 2018-11-05 ENCOUNTER — TELEPHONE (OUTPATIENT)
Dept: FAMILY MEDICINE | Facility: CLINIC | Age: 83
End: 2018-11-05

## 2018-11-05 NOTE — TELEPHONE ENCOUNTER
"ED / Discharge Outreach Protocol    Patient Contact    Attempt # 1    Was call answered?  Yes.  \"May I please speak with <Kristyn>\"  Is patient available?   Yes    ED/Discharge Protocol    \"Hi, my name is Lucia Hood, a registered nurse, and I am calling on behalf of Dr. Churchill's office at [facility].  I am calling to follow up and see how things are going for you after your recent visit.\"    \"I see that you were in the (ER/UC/IP) on 10/31/18-11/2/18.    How are you doing now that you are home?\" Doing well, everything feels great. Neck slightly tender     Is patient experiencing symptoms that may require a hospital visit?  No     Discharge Instructions    \"Let's review your discharge instructions.  What is/are the follow-up recommendations?  Pt. Response: Follow up with PCP within a week     \"Were you instructed to make a follow-up appointment?\"  Pt. Response: Yes.  Has appointment been made?   Yes      \"When you see the provider, I would recommend that you bring your discharge instructions with you.    Medications    \"How many new medications are you on since your hospitalization/ED visit?\"    0-1  \"How many of your current medicines changed (dose, timing, name, etc.) while you were in the hospital/ED visit?\"   0-1  \"Do you have questions about your medications?\"   No  \"Were you newly diagnosed with heart failure, COPD, diabetes or did you have a heart attack?\"   No  For patients on insulin: \"Did you start on insulin in the hospital or did you have your insulin dose changed?\"   No    Medication reconciliation completed? Yes    Was MTM referral placed (*Make sure to put transitions as reason for referral)?   No    Call Summary    \"Do you have any questions or concerns about your condition or care plan at the moment?\"    No  Patient was in ER 3x in the past year (assess appropriateness of ER visits.)      \"If you have questions or things don't continue to improve, we encourage you contact us through the main clinic " "number, ([phone])Even if the clinic is not open, triage nurses are available 24/7 to help you.     We would like you to know that our clinic has extended hours (provide information).  We also have urgent care (provide details on closest location and hours/contact info)\"      \"Thank you for your time and take care!\"    Lucia MCKNIGHT RN      Discharge Summary  Hospitalist     Date of Admission:  10/31/2018  Date of Discharge:  11/2/2018    Discharge Orders         Follow-up and recommended labs and tests    Follow up with primary care provider, Maryan Churchill, within 5-7 days for hospital follow- up.  No follow up labs or test are needed.      Activity   Your activity upon discharge: activity as tolerated      Full Code      Diet   Follow this diet upon discharge: Regular          Discharge Medications            Current Discharge Medication List             CONTINUE these medications which have NOT CHANGED     Details   acetaminophen (TYLENOL) 325 MG tablet Take 325-650 mg by mouth every 6 hours as needed for mild pain       ADVAIR DISKUS 100-50 MCG/DOSE diskus inhaler INHALE 1 PUFF EVERY 12 HOURS  Qty: 3 Inhaler, Refills: 3     Associated Diagnoses: Intermittent asthma, uncomplicated       albuterol (PROAIR HFA/PROVENTIL HFA/VENTOLIN HFA) 108 (90 BASE) MCG/ACT Inhaler Inhale 2 puffs into the lungs every 6 hours as needed for shortness of breath / dyspnea  Qty: 1 Inhaler, Refills: 3     Associated Diagnoses: Intermittent asthma, uncomplicated       atorvastatin (LIPITOR) 20 MG tablet Take 1 tablet (20 mg) by mouth daily  Qty: 90 tablet, Refills: 3     Associated Diagnoses: Hyperlipidemia LDL goal <70       buPROPion (WELLBUTRIN SR) 100 MG 12 hr tablet Take 1 tablet (100 mg) by mouth 2 times daily  Qty: 60 tablet, Refills: 3     Comments: Make sure this does not have yellow dye  Associated Diagnoses: Moderate episode of recurrent major depressive disorder (H)       Cholecalciferol (VITAMIN D) 2000 UNITS CAPS Take " 2,000 Units by mouth daily        clopidogrel (PLAVIX) 75 MG tablet Take 1 tablet (75 mg) by mouth daily  Qty: 90 tablet, Refills: 3     Associated Diagnoses: Coronary artery disease involving native coronary artery of native heart without angina pectoris       DULoxetine (CYMBALTA) 60 MG EC capsule Take 1 capsule (60 mg) by mouth daily  Qty: 90 capsule, Refills: 1     Comments: Profile Rx: patient will contact pharmacy when needed  Associated Diagnoses: Moderate major depression (H)       fluticasone (FLONASE) 50 MCG/ACT nasal spray Spray 1 spray into both nostrils daily as needed        glimepiride (AMARYL) 2 MG tablet Take 1 tablet (2 mg) by mouth every morning (before breakfast)  Qty: 90 tablet, Refills: 1     Associated Diagnoses: Type 2 diabetes mellitus with diabetic nephropathy, without long-term current use of insulin (H)       HYDROcodone-acetaminophen (NORCO) 5-325 MG per tablet Take 1 tablet by mouth every 8 hours as needed for moderate to severe pain  Qty: 90 tablet, Refills: 0     Associated Diagnoses: Multiple joint pain       IBANdronate (BONIVA) 150 MG tablet Take 150 mg by mouth every 30 days Patient takes on the first day of each month.       lisinopril (PRINIVIL/ZESTRIL) 20 MG tablet Take 1 tablet (20 mg) by mouth daily     Associated Diagnoses: Essential hypertension       Menthol, Topical Analgesic, (ICY HOT EX) Apply to affected area every morning       metoprolol succinate (TOPROL-XL) 100 MG 24 hr tablet Take 100 mg by mouth daily       neomycin-polymyxin-hydrocortisone (CORTISPORIN) otic solution Place 4 drops into both ears 4 times daily  Qty: 10 mL, Refills: 0     Associated Diagnoses: Left ear pain       nitroGLYcerin (NITROSTAT) 0.4 MG sublingual tablet 1 tab po in clinic  Qty: 1 tablet, Refills: 0     Associated Diagnoses: Chest pain, unspecified type; History of coronary artery disease; Type 2 diabetes mellitus with diabetic nephropathy, without long-term current use of insulin (H);  Essential hypertension       nystatin-triamcinolone (MYCOLOG II) cream APPLY TOPICALLY TWICE DAILY  Qty: 60 g, Refills: 1     Associated Diagnoses: Tinea of the body       ranitidine (ZANTAC) 150 MG tablet Take 1 tablet (150 mg) by mouth 2 times daily  Qty: 60 tablet, Refills: 1     Associated Diagnoses: Dry cough       blood glucose monitoring (NO BRAND SPECIFIED) meter device kit Use to test blood sugar 1-2 times daily or as directed.  Qty: 1 kit, Refills: 0     Comments: [employer] True Metrix Air Meter  Associated Diagnoses: Type 2 diabetes mellitus with diabetic nephropathy (H)       blood glucose monitoring (TRUE METRIX BLOOD GLUCOSE TEST) test strip 1 strip by In Vitro route daily  Qty: 200 strip, Refills: 3     Associated Diagnoses: Type 2 diabetes mellitus with diabetic nephropathy, without long-term current use of insulin (H)       Blood Glucose Monitoring Suppl (TRUE METRIX AIR GLUCOSE METER) W/DEVICE KIT USE AS DIRECTED  Qty: 1 kit, Refills: 0     Associated Diagnoses: Type 2 diabetes mellitus with diabetic nephropathy (H)       order for DME DREAMSTATION  5-15 CM/H20  NASAL WISP FABRIC       TRUEPLUS LANCETS 28G MISC 1 each 2 times daily as needed  Qty: 100 each, Refills: 3     Comments: Trueplus Super Think 28g lancets  Associated Diagnoses: Type 2 diabetes mellitus with diabetic nephropathy (H)                STOP taking these medications         NIFEdipine ER osmotic (ADALAT CC) 30 MG TB24 Comments:   Reason for Stopping:

## 2018-11-08 ENCOUNTER — OFFICE VISIT (OUTPATIENT)
Dept: FAMILY MEDICINE | Facility: CLINIC | Age: 83
End: 2018-11-08
Payer: COMMERCIAL

## 2018-11-08 VITALS
OXYGEN SATURATION: 97 % | SYSTOLIC BLOOD PRESSURE: 140 MMHG | WEIGHT: 281 LBS | HEART RATE: 79 BPM | BODY MASS INDEX: 44.1 KG/M2 | HEIGHT: 67 IN | TEMPERATURE: 97.4 F | DIASTOLIC BLOOD PRESSURE: 80 MMHG

## 2018-11-08 DIAGNOSIS — I10 ESSENTIAL HYPERTENSION: ICD-10-CM

## 2018-11-08 DIAGNOSIS — I25.10 CORONARY ARTERY CALCIFICATION SEEN ON CAT SCAN: ICD-10-CM

## 2018-11-08 DIAGNOSIS — N20.0 KIDNEY STONE: ICD-10-CM

## 2018-11-08 DIAGNOSIS — F33.1 MODERATE EPISODE OF RECURRENT MAJOR DEPRESSIVE DISORDER (H): ICD-10-CM

## 2018-11-08 DIAGNOSIS — R05.8 DRY COUGH: ICD-10-CM

## 2018-11-08 DIAGNOSIS — E11.21 TYPE 2 DIABETES MELLITUS WITH DIABETIC NEPHROPATHY, WITHOUT LONG-TERM CURRENT USE OF INSULIN (H): ICD-10-CM

## 2018-11-08 DIAGNOSIS — K21.9 GASTROESOPHAGEAL REFLUX DISEASE WITHOUT ESOPHAGITIS: ICD-10-CM

## 2018-11-08 DIAGNOSIS — I34.2 NON-RHEUMATIC MITRAL VALVE STENOSIS: ICD-10-CM

## 2018-11-08 DIAGNOSIS — R07.89 ATYPICAL CHEST PAIN: Primary | ICD-10-CM

## 2018-11-08 PROCEDURE — 99495 TRANSJ CARE MGMT MOD F2F 14D: CPT | Performed by: INTERNAL MEDICINE

## 2018-11-08 RX ORDER — BUPROPION HYDROCHLORIDE 100 MG/1
100 TABLET, EXTENDED RELEASE ORAL 2 TIMES DAILY
Qty: 180 TABLET | Refills: 3 | Status: SHIPPED | OUTPATIENT
Start: 2018-11-08 | End: 2019-09-20

## 2018-11-08 RX ORDER — LOSARTAN POTASSIUM 50 MG/1
50 TABLET ORAL DAILY
Qty: 90 TABLET | Refills: 1 | Status: SHIPPED | OUTPATIENT
Start: 2018-11-08 | End: 2019-03-08

## 2018-11-08 RX ORDER — LISINOPRIL 20 MG/1
20 TABLET ORAL DAILY
Qty: 90 TABLET | Refills: 1 | Status: CANCELLED | OUTPATIENT
Start: 2018-11-08

## 2018-11-08 NOTE — PATIENT INSTRUCTIONS
Continue lisinopril until you get prescription of losartan  Once you get losartan, stop lisinopril because of cough  Monitor your blood pressure once a week at home.  Bring those readings on your next visit.  Make appointment with urology  Notify us if your blood pressure readings consistently stays greater than 140/90.  Keep follow up appointment with me as scheduled  Seek sooner medical attention if there is any worsening of symptoms or problems

## 2018-11-08 NOTE — MR AVS SNAPSHOT
After Visit Summary   11/8/2018    Moira Andre    MRN: [id number]           Patient Information     Date Of Birth          9/24/1933        Visit Information        Provider Department      11/8/2018 11:30 AM Maryan Churchill MD [facility]        Today's Diagnoses     Atypical chest pain    -  1    Essential hypertension        Moderate episode of recurrent major depressive disorder (H)        Dry cough        Gastroesophageal reflux disease without esophagitis        Type 2 diabetes mellitus with diabetic nephropathy, without long-term current use of insulin (H)        Non-rheumatic mitral valve stenosis        Coronary artery calcification seen on CAT scan        Kidney stone          Care Instructions    Continue lisinopril until you get prescription of losartan  Once you get losartan, stop lisinopril because of cough  Monitor your blood pressure once a week at home.  Bring those readings on your next visit.  Make appointment with urology  Notify us if your blood pressure readings consistently stays greater than 140/90.  Keep follow up appointment with me as scheduled  Seek sooner medical attention if there is any worsening of symptoms or problems          Follow-ups after your visit        Additional Services     UROLOGY ADULT REFERRAL       Your provider has referred you to: [facility]: [facility] [place] [phone]   [email or web address]    Please be aware that coverage of these services is subject to the terms and limitations of your health insurance plan.  Call member services at your health plan with any benefit or coverage questions.      Please bring the following with you to your appointment:    (1) Any X-Rays, CTs or MRIs which have been performed.  Contact the facility where they were done to arrange for  prior to your scheduled appointment.    (2) List of current medications  (3) This referral request   (4) Any documents/labs  "given to you for this referral                  Your next 10 appointments already scheduled     Dec 03, 2018  9:00 AM CST   Office Visit with [facility] MD   [facility]    [address] [facility] [address] [phone]           Bring a current list of meds and any records pertaining to this visit. For Physicals, please bring immunization records and any forms needing to be filled out. Please arrive 10 minutes early to complete paperwork.              Who to contact     If you have questions or need follow up information about today's clinic visit or your schedule please contact [facility] directly at [phone].  Normal or non-critical lab and imaging results will be communicated to you by MyChart, letter or phone within 4 business days after the clinic has received the results. If you do not hear from us within 7 days, please contact the clinic through MyChart or phone. If you have a critical or abnormal lab result, we will notify you by phone as soon as possible.  Submit refill requests through [employer] or call your pharmacy and they will forward the refill request to us. Please allow 3 business days for your refill to be completed.          Additional Information About Your Visit        Care EveryWhere ID     This is your Care EveryWhere ID. This could be used by other organizations to access your [place] medical records  [id number]        Your Vitals Were     Pulse Temperature Height Pulse Oximetry Breastfeeding? BMI (Body Mass Index)    79 97.4  F (36.3  C) (Oral) 5' 7\" (1.702 m) 97% No 44.01 kg/m2       Blood Pressure from Last 3 Encounters:   11/08/18 140/80   11/02/18 137/65   10/31/18 (!) 187/99    Weight from Last 3 Encounters:   11/08/18 281 lb (127.5 kg)   11/02/18 280 lb 3.2 oz (127.1 kg)   10/04/18 283 lb (128.4 kg)              We Performed the Following     UROLOGY ADULT REFERRAL          Today's Medication Changes          These " changes are accurate as of 11/8/18 11:50 AM.  If you have any questions, ask your nurse or doctor.               Start taking these medicines.        Dose/Directions    losartan 50 MG tablet   Commonly known as:  COZAAR   Used for:  Essential hypertension   Started by:  Maryan Churchill MD        Dose:  50 mg   Take 1 tablet (50 mg) by mouth daily   Quantity:  90 tablet   Refills:  1         These medicines have changed or have updated prescriptions.        Dose/Directions    clopidogrel 75 MG tablet   Commonly known as:  PLAVIX   This may have changed:  when to take this   Used for:  Coronary artery disease involving native coronary artery of native heart without angina pectoris        Dose:  75 mg   Take 1 tablet (75 mg) by mouth daily   Quantity:  90 tablet   Refills:  3       DULoxetine 60 MG EC capsule   Commonly known as:  CYMBALTA   This may have changed:  when to take this   Used for:  Moderate major depression (H)        Dose:  60 mg   Take 1 capsule (60 mg) by mouth daily   Quantity:  90 capsule   Refills:  1       neomycin-polymyxin-hydrocortisone otic solution   Commonly known as:  CORTISPORIN   This may have changed:    - when to take this  - reasons to take this   Used for:  Left ear pain        Dose:  4 drop   Place 4 drops into both ears 4 times daily   Quantity:  10 mL   Refills:  0       nystatin-triamcinolone cream   Commonly known as:  MYCOLOG II   This may have changed:  See the new instructions.   Used for:  Tinea of the body        APPLY TOPICALLY TWICE DAILY   Quantity:  60 g   Refills:  1       ranitidine 150 MG tablet   Commonly known as:  ZANTAC   This may have changed:    - when to take this  - reasons to take this   Used for:  Dry cough, Gastroesophageal reflux disease without esophagitis        Dose:  150 mg   Take 1 tablet (150 mg) by mouth 2 times daily   Quantity:  180 tablet   Refills:  1         Stop taking these medicines if you haven't already. Please contact your care team if  you have questions.     lisinopril 20 MG tablet   Commonly known as:  PRINIVIL/ZESTRIL   Stopped by:  Maryan Churchill MD                Where to get your medicines      These medications were sent to [facility] - [place], [place] - [address]  [address] [facility] [address]     Phone:  [phone]     buPROPion 100 MG 12 hr tablet    losartan 50 MG tablet    ranitidine 150 MG tablet                Primary Care Provider Office Phone # Fax #    Maryan Churchill [phone] [address]  [place]                [place] [address]        Equal Access to Services     [facility]: Hadii aad ku hadasho Que, waaxda luqadaha, qaybta kaalmada adegeyada, waxay fernandoin luis miguel shaw . So [facility] [phone].    ATENCIÓN: Si habla español, tiene a gold disposición servicios gratuitos de asistencia lingüística. [facility] [phone].    We comply with applicable federal civil rights laws and [place] laws. We do not discriminate on the basis of race, color, national origin, age, disability, sex, sexual orientation, or gender identity.            Thank you!     Thank you for choosing [facility]  for your care. Our goal is always to provide you with excellent care. Hearing back from our patients is one way we can continue to improve our services. Please take a few minutes to complete the written survey that you may receive in the mail after your visit with us. Thank you!             Your Updated Medication List - Protect others around you: Learn how to safely use, store and throw away your medicines at www.disposemymeds.org.          This list is accurate as of 11/8/18 11:50 AM.  Always use your most recent med list.                   Brand Name Dispense Instructions for use Diagnosis    acetaminophen 325 MG tablet    TYLENOL     Take 325-650 mg by mouth every 6 hours as needed for mild pain        ADVAIR DISKUS 100-50 MCG/DOSE diskus inhaler    Generic drug:  fluticasone-salmeterol     3 Inhaler    INHALE 1 PUFF EVERY 12 HOURS    Intermittent asthma, uncomplicated       albuterol 108 (90 Base) MCG/ACT inhaler    PROAIR HFA/PROVENTIL HFA/VENTOLIN HFA    1 Inhaler    Inhale 2 puffs into the lungs every 6 hours as needed for shortness of breath / dyspnea    Intermittent asthma, uncomplicated       atorvastatin 20 MG tablet    LIPITOR    90 tablet    Take 1 tablet (20 mg) by mouth daily    Hyperlipidemia LDL goal <70       * blood glucose monitoring meter device kit    no brand specified    1 kit    Use to test blood sugar 1-2 times daily or as directed.    Type 2 diabetes mellitus with diabetic nephropathy (H)       * TRUE METRIX AIR GLUCOSE METER w/Device Kit     1 kit    USE AS DIRECTED    Type 2 diabetes mellitus with diabetic nephropathy (H)       blood glucose monitoring test strip    TRUE METRIX BLOOD GLUCOSE TEST    200 strip    1 strip by In Vitro route daily    Type 2 diabetes mellitus with diabetic nephropathy, without long-term current use of insulin (H)       buPROPion 100 MG 12 hr tablet    WELLBUTRIN SR    180 tablet    Take 1 tablet (100 mg) by mouth 2 times daily    Moderate episode of recurrent major depressive disorder (H)       clopidogrel 75 MG tablet    PLAVIX    90 tablet    Take 1 tablet (75 mg) by mouth daily    Coronary artery disease involving native coronary artery of native heart without angina pectoris       DULoxetine 60 MG EC capsule    CYMBALTA    90 capsule    Take 1 capsule (60 mg) by mouth daily    Moderate major depression (H)       FLONASE 50 MCG/ACT spray   Generic drug:  fluticasone      Spray 1 spray into both nostrils daily as needed        glimepiride 2 MG tablet    AMARYL    90 tablet    Take 1 tablet (2 mg) by mouth every morning (before breakfast)    Type 2 diabetes mellitus with diabetic nephropathy, without long-term current use of insulin (H)       HYDROcodone-acetaminophen 5-325 MG per tablet    NORCO    90  tablet    Take 1 tablet by mouth every 8 hours as needed for moderate to severe pain    Multiple joint pain       IBANdronate 150 MG tablet    BONIVA     Take 150 mg by mouth every 30 days Patient takes on the first day of each month.        ICY HOT EX      Apply to affected area every morning        losartan 50 MG tablet    COZAAR    90 tablet    Take 1 tablet (50 mg) by mouth daily    Essential hypertension       metoprolol succinate 100 MG 24 hr tablet    TOPROL-XL     Take 100 mg by mouth daily        neomycin-polymyxin-hydrocortisone otic solution    CORTISPORIN    10 mL    Place 4 drops into both ears 4 times daily    Left ear pain       nitroGLYcerin 0.4 MG sublingual tablet    NITROSTAT    1 tablet    1 tab po in clinic    Chest pain, unspecified type, History of coronary artery disease, Type 2 diabetes mellitus with diabetic nephropathy, without long-term current use of insulin (H), Essential hypertension       nystatin-triamcinolone cream    MYCOLOG II    60 g    APPLY TOPICALLY TWICE DAILY    Tinea of the body       order for DME      DREAMSTATION 5-15 CM/H20 NASAL WISP FABRIC        ranitidine 150 MG tablet    ZANTAC    180 tablet    Take 1 tablet (150 mg) by mouth 2 times daily    Dry cough, Gastroesophageal reflux disease without esophagitis       TRUEPLUS LANCETS 28G Misc     100 each    1 each 2 times daily as needed    Type 2 diabetes mellitus with diabetic nephropathy (H)       vitamin D 2000 units Caps      Take 2,000 Units by mouth daily        * Notice:  This list has 2 medication(s) that are the same as other medications prescribed for you. Read the directions carefully, and ask your doctor or other care provider to review them with you.

## 2018-11-09 ASSESSMENT — ASTHMA QUESTIONNAIRES: ACT_TOTALSCORE: 19

## 2018-11-14 DIAGNOSIS — E11.21 TYPE 2 DIABETES MELLITUS WITH DIABETIC NEPHROPATHY, WITHOUT LONG-TERM CURRENT USE OF INSULIN (H): ICD-10-CM

## 2018-11-16 RX ORDER — CALCIUM CITRATE/VITAMIN D3 200MG-6.25
TABLET ORAL
Qty: 100 STRIP | Refills: 1 | Status: SHIPPED | OUTPATIENT
Start: 2018-11-16 | End: 2019-10-16

## 2018-11-16 NOTE — TELEPHONE ENCOUNTER
Prescription approved per [facility] Refill Protocol.  Lucia MCKNIGHT RN

## 2018-12-03 ENCOUNTER — OFFICE VISIT (OUTPATIENT)
Dept: FAMILY MEDICINE | Facility: CLINIC | Age: 83
End: 2018-12-03
Payer: COMMERCIAL

## 2018-12-03 VITALS
SYSTOLIC BLOOD PRESSURE: 139 MMHG | WEIGHT: 275.9 LBS | OXYGEN SATURATION: 93 % | HEART RATE: 76 BPM | BODY MASS INDEX: 43.3 KG/M2 | HEIGHT: 67 IN | DIASTOLIC BLOOD PRESSURE: 84 MMHG | TEMPERATURE: 96.9 F

## 2018-12-03 DIAGNOSIS — E11.21 TYPE 2 DIABETES MELLITUS WITH DIABETIC NEPHROPATHY, WITHOUT LONG-TERM CURRENT USE OF INSULIN (H): ICD-10-CM

## 2018-12-03 DIAGNOSIS — E66.01 MORBID OBESITY (H): Primary | ICD-10-CM

## 2018-12-03 DIAGNOSIS — G47.33 OSA ON CPAP: ICD-10-CM

## 2018-12-03 DIAGNOSIS — K43.9 VENTRAL HERNIA WITHOUT OBSTRUCTION OR GANGRENE: ICD-10-CM

## 2018-12-03 DIAGNOSIS — M48.061 SPINAL STENOSIS OF LUMBAR REGION, UNSPECIFIED WHETHER NEUROGENIC CLAUDICATION PRESENT: ICD-10-CM

## 2018-12-03 PROCEDURE — 99214 OFFICE O/P EST MOD 30 MIN: CPT | Performed by: INTERNAL MEDICINE

## 2018-12-03 NOTE — MR AVS SNAPSHOT
After Visit Summary   12/3/2018    Moira Andre    MRN: [id number]           Patient Information     Date Of Birth          9/24/1933        Visit Information        Provider Department      12/3/2018 9:00 AM Maryan Churchill MD [facility]        Today's Diagnoses     Morbid obesity (H)    -  1    Type 2 diabetes mellitus with diabetic nephropathy, without long-term current use of insulin (H)        Ventral hernia without obstruction or gangrene        ELIGIO on CPAP        Spinal stenosis of lumbar region, unspecified whether neurogenic claudication present          Care Instructions    Take fall precautions  There is a new Shingles vaccine called SHINGRIX  It's is a series of two shots, 2-6 months apart  It is considered more than 90% effective  Please go to our pharmacy located in [address] to get the vaccine  You are due to see urologist and general surgery for ventral hernia  Follow up in 3 months  Seek sooner medical attention if there is any worsening of symptoms or problems          Follow-ups after your visit        Follow-up notes from your care team     Return in about 3 months (around 3/3/2019).      Who to contact     If you have questions or need follow up information about today's clinic visit or your schedule please contact [facility] directly at [phone].  Normal or non-critical lab and imaging results will be communicated to you by MyChart, letter or phone within 4 business days after the clinic has received the results. If you do not hear from us within 7 days, please contact the clinic through MyChart or phone. If you have a critical or abnormal lab result, we will notify you by phone as soon as possible.  Submit refill requests through [employer]al[employer] or call your pharmacy and they will forward the refill request to us. Please allow 3 business days for your refill to be completed.          Additional Information About Your Visit        MyChart Information      "MiTio lets you send messages to your doctor, view your test results, renew your prescriptions, schedule appointments and more. To sign up, go to [email or web address] . Click on \"Log in\" on the left side of the screen, which will take you to the Welcome page. Then click on \"Sign up Now\" on the right side of the page.     You will be asked to enter the access code listed below, as well as some personal information. Please follow the directions to create your username and password.     Your access code is: [email or web address]  Expires: 3/3/2019  9:42 AM     Your access code will  in 90 days. If you need help or a new code, please call your [place] clinic or [phone].        Care EveryWhere ID     This is your Care EveryWhere ID. This could be used by other organizations to access your [place] medical records  [id number]        Your Vitals Were     Pulse Temperature Height Pulse Oximetry BMI (Body Mass Index)       76 96.9  F (36.1  C) (Oral) 5' 7\" (1.702 m) 93% 43.21 kg/m2        Blood Pressure from Last 3 Encounters:   18 139/84   18 140/80   18 137/65    Weight from Last 3 Encounters:   18 275 lb 14.4 oz (125.1 kg)   18 281 lb (127.5 kg)   18 280 lb 3.2 oz (127.1 kg)              Today, you had the following     No orders found for display         Today's Medication Changes          These changes are accurate as of 12/3/18  9:42 AM.  If you have any questions, ask your nurse or doctor.               These medicines have changed or have updated prescriptions.        Dose/Directions    clopidogrel 75 MG tablet   Commonly known as:  PLAVIX   This may have changed:  when to take this   Used for:  Coronary artery disease involving native coronary artery of native heart without angina pectoris        Dose:  75 mg   Take 1 tablet (75 mg) by mouth daily   Quantity:  90 tablet   Refills:  3       DULoxetine 60 MG capsule   Commonly known as:  CYMBALTA   This may have changed:  " when to take this   Used for:  Moderate major depression (H)        Dose:  60 mg   Take 1 capsule (60 mg) by mouth daily   Quantity:  90 capsule   Refills:  1       neomycin-polymyxin-hydrocortisone 3.5-67891-7 otic solution   Commonly known as:  CORTISPORIN   This may have changed:    - when to take this  - reasons to take this   Used for:  Left ear pain        Dose:  4 drop   Place 4 drops into both ears 4 times daily   Quantity:  10 mL   Refills:  0       nystatin-triamcinolone 434677-8.1 UNIT/GM-% external cream   Commonly known as:  MYCOLOG II   This may have changed:  See the new instructions.   Used for:  Tinea of the body        APPLY TOPICALLY TWICE DAILY   Quantity:  60 g   Refills:  1                Primary Care Provider Office Phone # Fax #    Maryan Churchill [phone] [address] [facility] [address]  [facility] [address]        Equal Access to Services     [facility]: Hadii aad shahnaz sono Que, waaxda luqadaha, qaybta kaalmada adegeyada, waxay halle shaw . So [facility] [phone].    ATENCIÓN: Si habla español, tiene a gold disposición servicios gratuitos de asistencia lingüística. Chuck al [phone].    We comply with applicable federal civil rights laws and [place] laws. We do not discriminate on the basis of race, color, national origin, age, disability, sex, sexual orientation, or gender identity.            Thank you!     Thank you for choosing [facility]  for your care. Our goal is always to provide you with excellent care. Hearing back from our patients is one way we can continue to improve our services. Please take a few minutes to complete the written survey that you may receive in the mail after your visit with us. Thank you!             Your Updated Medication List - Protect others around you: Learn how to safely use, store and throw away your medicines at www.disposemymeds.org.          This list is accurate as of  12/3/18  9:42 AM.  Always use your most recent med list.                   Brand Name Dispense Instructions for use Diagnosis    acetaminophen 325 MG tablet    TYLENOL     Take 325-650 mg by mouth every 6 hours as needed for mild pain        ADVAIR DISKUS 100-50 MCG/DOSE inhaler   Generic drug:  fluticasone-salmeterol     3 Inhaler    INHALE 1 PUFF EVERY 12 HOURS    Intermittent asthma, uncomplicated       albuterol 108 (90 Base) MCG/ACT inhaler    PROAIR HFA/PROVENTIL HFA/VENTOLIN HFA    1 Inhaler    Inhale 2 puffs into the lungs every 6 hours as needed for shortness of breath / dyspnea    Intermittent asthma, uncomplicated       atorvastatin 20 MG tablet    LIPITOR    90 tablet    Take 1 tablet (20 mg) by mouth daily    Hyperlipidemia LDL goal <70       * blood glucose monitoring meter device kit    NO BRAND SPECIFIED    1 kit    Use to test blood sugar 1-2 times daily or as directed.    Type 2 diabetes mellitus with diabetic nephropathy (H)       * TRUE METRIX AIR GLUCOSE METER w/Device Kit     1 kit    USE AS DIRECTED    Type 2 diabetes mellitus with diabetic nephropathy (H)       buPROPion 100 MG 12 hr tablet    WELLBUTRIN SR    180 tablet    Take 1 tablet (100 mg) by mouth 2 times daily    Moderate episode of recurrent major depressive disorder (H)       clopidogrel 75 MG tablet    PLAVIX    90 tablet    Take 1 tablet (75 mg) by mouth daily    Coronary artery disease involving native coronary artery of native heart without angina pectoris       DULoxetine 60 MG capsule    CYMBALTA    90 capsule    Take 1 capsule (60 mg) by mouth daily    Moderate major depression (H)       FLONASE 50 MCG/ACT nasal spray   Generic drug:  fluticasone      Spray 1 spray into both nostrils daily as needed        glimepiride 2 MG tablet    AMARYL    90 tablet    Take 1 tablet (2 mg) by mouth every morning (before breakfast)    Type 2 diabetes mellitus with diabetic nephropathy, without long-term current use of insulin (H)        HYDROcodone-acetaminophen 5-325 MG tablet    NORCO    90 tablet    Take 1 tablet by mouth every 8 hours as needed for moderate to severe pain    Multiple joint pain       IBANdronate 150 MG tablet    BONIVA     Take 150 mg by mouth every 30 days Patient takes on the first day of each month.        ICY HOT EX      Apply to affected area every morning        losartan 50 MG tablet    COZAAR    90 tablet    Take 1 tablet (50 mg) by mouth daily    Essential hypertension       metoprolol succinate  MG 24 hr tablet    TOPROL-XL     Take 100 mg by mouth daily        neomycin-polymyxin-hydrocortisone 3.5-59047-0 otic solution    CORTISPORIN    10 mL    Place 4 drops into both ears 4 times daily    Left ear pain       nitroGLYcerin 0.4 MG sublingual tablet    NITROSTAT    1 tablet    1 tab po in clinic    Chest pain, unspecified type, History of coronary artery disease, Type 2 diabetes mellitus with diabetic nephropathy, without long-term current use of insulin (H), Essential hypertension       nystatin-triamcinolone 499132-0.1 UNIT/GM-% external cream    MYCOLOG II    60 g    APPLY TOPICALLY TWICE DAILY    Tinea of the body       order for DME      DREAMSTATION 5-15 CM/H20 NASAL WISP FABRIC        ranitidine 150 MG tablet    ZANTAC    180 tablet    Take 1 tablet (150 mg) by mouth 2 times daily    Dry cough, Gastroesophageal reflux disease without esophagitis       TRUE METRIX BLOOD GLUCOSE TEST test strip   Generic drug:  blood glucose monitoring     100 strip    USE TO TEST DAILY    Type 2 diabetes mellitus with diabetic nephropathy, without long-term current use of insulin (H)       TRUEPLUS LANCETS 28G Misc     100 each    1 each 2 times daily as needed    Type 2 diabetes mellitus with diabetic nephropathy (H)       vitamin D 2000 units Caps      Take 2,000 Units by mouth daily        * Notice:  This list has 2 medication(s) that are the same as other medications prescribed for you. Read the directions carefully,  and ask your doctor or other care provider to review them with you.

## 2018-12-03 NOTE — PATIENT INSTRUCTIONS
Take fall precautions  There is a new Shingles vaccine called SHINGRIX  It's is a series of two shots, 2-6 months apart  It is considered more than 90% effective  Please go to our pharmacy located in [address] to get the vaccine  You are due to see urologist and general surgery for ventral hernia  Follow up in 3 months  Seek sooner medical attention if there is any worsening of symptoms or problems

## 2019-01-22 ENCOUNTER — TRANSFERRED RECORDS (OUTPATIENT)
Dept: HEALTH INFORMATION MANAGEMENT | Facility: CLINIC | Age: 84
End: 2019-01-22

## 2019-03-04 ENCOUNTER — OFFICE VISIT (OUTPATIENT)
Dept: FAMILY MEDICINE | Facility: CLINIC | Age: 84
End: 2019-03-04
Payer: COMMERCIAL

## 2019-03-04 VITALS
BODY MASS INDEX: 44.57 KG/M2 | WEIGHT: 284 LBS | HEART RATE: 71 BPM | OXYGEN SATURATION: 95 % | TEMPERATURE: 97.9 F | HEIGHT: 67 IN | SYSTOLIC BLOOD PRESSURE: 130 MMHG | DIASTOLIC BLOOD PRESSURE: 70 MMHG

## 2019-03-04 DIAGNOSIS — F33.1 MODERATE EPISODE OF RECURRENT MAJOR DEPRESSIVE DISORDER (H): ICD-10-CM

## 2019-03-04 DIAGNOSIS — B35.1 ONYCHOMYCOSIS: ICD-10-CM

## 2019-03-04 DIAGNOSIS — E11.21 TYPE 2 DIABETES MELLITUS WITH DIABETIC NEPHROPATHY, WITHOUT LONG-TERM CURRENT USE OF INSULIN (H): Primary | ICD-10-CM

## 2019-03-04 DIAGNOSIS — D64.9 ANEMIA, UNSPECIFIED TYPE: ICD-10-CM

## 2019-03-04 DIAGNOSIS — E66.01 MORBID OBESITY (H): ICD-10-CM

## 2019-03-04 LAB
ANION GAP SERPL CALCULATED.3IONS-SCNC: 10 MMOL/L (ref 3–14)
BUN SERPL-MCNC: 28 MG/DL (ref 7–30)
CALCIUM SERPL-MCNC: 9.7 MG/DL (ref 8.5–10.1)
CHLORIDE SERPL-SCNC: 103 MMOL/L (ref 94–109)
CO2 SERPL-SCNC: 24 MMOL/L (ref 20–32)
CREAT SERPL-MCNC: 1.51 MG/DL (ref 0.52–1.04)
ERYTHROCYTE [DISTWIDTH] IN BLOOD BY AUTOMATED COUNT: 15.3 % (ref 10–15)
FERRITIN SERPL-MCNC: 136 NG/ML (ref 8–252)
GFR SERPL CREATININE-BSD FRML MDRD: 31 ML/MIN/{1.73_M2}
GLUCOSE SERPL-MCNC: 161 MG/DL (ref 70–99)
HBA1C MFR BLD: 6.4 % (ref 0–5.6)
HCT VFR BLD AUTO: 41.1 % (ref 35–47)
HGB BLD-MCNC: 12.9 G/DL (ref 11.7–15.7)
MCH RBC QN AUTO: 26.6 PG (ref 26.5–33)
MCHC RBC AUTO-ENTMCNC: 31.4 G/DL (ref 31.5–36.5)
MCV RBC AUTO: 85 FL (ref 78–100)
PLATELET # BLD AUTO: 307 10E9/L (ref 150–450)
POTASSIUM SERPL-SCNC: 4.7 MMOL/L (ref 3.4–5.3)
RBC # BLD AUTO: 4.85 10E12/L (ref 3.8–5.2)
SODIUM SERPL-SCNC: 137 MMOL/L (ref 133–144)
WBC # BLD AUTO: 8.8 10E9/L (ref 4–11)

## 2019-03-04 PROCEDURE — 80048 BASIC METABOLIC PNL TOTAL CA: CPT | Performed by: INTERNAL MEDICINE

## 2019-03-04 PROCEDURE — 85027 COMPLETE CBC AUTOMATED: CPT | Performed by: INTERNAL MEDICINE

## 2019-03-04 PROCEDURE — 36415 COLL VENOUS BLD VENIPUNCTURE: CPT | Performed by: INTERNAL MEDICINE

## 2019-03-04 PROCEDURE — 83036 HEMOGLOBIN GLYCOSYLATED A1C: CPT | Performed by: INTERNAL MEDICINE

## 2019-03-04 PROCEDURE — 99214 OFFICE O/P EST MOD 30 MIN: CPT | Performed by: INTERNAL MEDICINE

## 2019-03-04 PROCEDURE — 82728 ASSAY OF FERRITIN: CPT | Performed by: INTERNAL MEDICINE

## 2019-03-04 ASSESSMENT — MIFFLIN-ST. JEOR: SCORE: 1765.85

## 2019-03-04 ASSESSMENT — PATIENT HEALTH QUESTIONNAIRE - PHQ9: SUM OF ALL RESPONSES TO PHQ QUESTIONS 1-9: 3

## 2019-03-04 NOTE — PROGRESS NOTES
SUBJECTIVE:   Moira Andre is a 85 year old female who presents to clinic today for the following health issues:    Diabetes Follow-up    Patient is checking blood sugars: once daily.  Results are as follows:         am - 120's    Diabetic concerns: None     Symptoms of hypoglycemia (low blood sugar): none     Paresthesias (numbness or burning in feet) or sores: No     Date of last diabetic eye exam: Over a year ago    BP Readings from Last 2 Encounters:   03/04/19 130/70   12/03/18 139/84     Hemoglobin A1C (%)   Date Value   10/31/2018 7.0 (H)   07/04/2018 7.6 (H)     LDL Cholesterol Calculated (mg/dL)   Date Value   08/06/2018 46   04/03/2018 74       Diabetes Management Resources    Amount of exercise or physical activity: None    Problems taking medications regularly: No    Medication side effects: none    Diet: regular (no restrictions)    Fall  Patient fell this morning on her face ( mechanical fall)  She hit her hit on a cabinet  It was difficult for her to get up  She didn't call anyone because her door was locked anyway  Denies changes in consciousness, loss of consciousness  Denies severe pain    Fungal infection in feet  Patient reports that her nails have been infected  She hasn't tried any OTC medications      Problem list and histories reviewed & adjusted, as indicated.  Additional history: as documented    Medications and labs reviewed in EPIC    Reviewed and updated as needed this visit by clinical staff  Tobacco  Allergies  Meds       Reviewed and updated as needed this visit by Provider       ROS:  Constitutional, HEENT, cardiovascular, pulmonary, GI, , musculoskeletal, neuro, skin, endocrine and psych systems are negative, except as otherwise noted.    POSITIVE for foot infection    This document serves as a record of the services and decisions personally performed and made by Maryan Churchill MD. It was created on her behalf by Jeanette Ha, a trained medical scribe. The creation of  "this document is based on the provider's statements to the medical scribe.  Jeanette Wintersdeanna 9:47 AM March 4, 2019    OBJECTIVE:     /70   Pulse 71   Temp 97.9  F (36.6  C) (Oral)   Ht 1.702 m (5' 7\")   Wt 128.8 kg (284 lb)   SpO2 95%   Breastfeeding? No   BMI 44.48 kg/m    Body mass index is 44.48 kg/m .     GENERAL: uses assisted walker, morbidly obese, alert and no distress  PSYCH: mentation appears normal, affect normal/bright  Diabetic foot exam: normal DP and PT pulses, normal sensory exam, dry cracking heels and onychomycosis    Diagnostic Test Results:  No results found for this or any previous visit (from the past 24 hour(s)).    ASSESSMENT/PLAN:     Moira was seen today for recheck.    Diagnoses and all orders for this visit:    Type 2 diabetes mellitus with diabetic nephropathy, without long-term current use of insulin (H)  -     Hemoglobin A1c  -     Basic metabolic panel  Doing well  Compliant with medication  Lab Results   Component Value Date    A1C 7.0 10/31/2018    A1C 7.6 07/04/2018    A1C 7.2 04/03/2018    A1C 6.6 08/15/2017    A1C 6.7 04/11/2017     Moderate episode of recurrent major depressive disorder (H)  Doing well  Compliant with medication    Morbid obesity (H)  Advised healthy eating and exercise habits    Onychomycosis  Patient has longstanding onychomycosis  Reports it's gotten worse lately  Upon examination, sensations and pulses were intact  Advised using Erick's vaporub  Advised letting me know if symptoms don't improve    Anemia, unspecified type  -     CBC with platelets  -     Ferritin    Fall  Patient had a mechanical fall this morning  Denies loss of consciousness or severe pain  However, it was difficult to her to get up  She lives alone  Advised patient to get a lifeline  Advised follow up if any symptoms relating to fall arise  Fall prevention counseling done    She has appointment with pain clinic tomorrow   Injections is back did not work  Reviewed the note of " the pain clinic and tomorrow they will discuss either facet joint injection or radiofrequency ablation    She has not made appointment for ventral hernia repair yet.  Patient Instructions   I refilled your prescriptions  Use Erick's vaporub on your toe nails   Monitor your blood pressure once a week at home.  Bring those readings on your next visit.  Notify us if your blood pressure readings consistently stays greater than 140/90.  Follow up in 4 months  Seek sooner medical attention if there is any worsening of symptoms or problems    The information in this document, created by the medical scribe for me, accurately reflects the services I personally performed and the decisions made by me. I have reviewed and approved this document for accuracy prior to leaving the patient care area.  March 4, 2019 10:04 AM    Maryan Churchill MD  [facility]

## 2019-03-04 NOTE — PATIENT INSTRUCTIONS
I refilled your prescriptions  Use Erick's vaporub on your toe nails   Monitor your blood pressure once a week at home.  Bring those readings on your next visit.  Notify us if your blood pressure readings consistently stays greater than 140/90.  Follow up in 4 months  Seek sooner medical attention if there is any worsening of symptoms or problems

## 2019-03-04 NOTE — LETTER
[place] [address] [facility]  [address]  [place]  [address]  Tel: [phone]    March 5, 2019    Moira ALEGRIA Jes  [address] [facility] [address]  [facility] [place] [address]        Dear Ms. Andre,    This is to inform you regarding your test result.    Ferritin which is iron stores in the body is normal.  Chronic kidney disease is slightly worse  Stay well hydrated .  CBC result which includes Hemoglobin and  Platelet Counts is satisfactory.  HbA1c which is average glucose during last 3 months is 6.4%.  Your diabetes is under control.    Sincerely,    Maryan Churchill MD/JOSÉ MIGUEL          Enclosure: Lab Results  Results for orders placed or performed in visit on 03/04/19   Hemoglobin A1c   Result Value Ref Range    Hemoglobin A1C 6.4 (H) 0 - 5.6 %   CBC with platelets   Result Value Ref Range    WBC 8.8 4.0 - 11.0 10e9/L    RBC Count 4.85 3.8 - 5.2 10e12/L    Hemoglobin 12.9 11.7 - 15.7 g/dL    Hematocrit 41.1 35.0 - 47.0 %    MCV 85 78 - 100 fl    MCH 26.6 26.5 - 33.0 pg    MCHC 31.4 (L) 31.5 - 36.5 g/dL    RDW 15.3 (H) 10.0 - 15.0 %    Platelet Count 307 150 - 450 10e9/L   Ferritin   Result Value Ref Range    Ferritin 136 8 - 252 ng/mL   Basic metabolic panel   Result Value Ref Range    Sodium 137 133 - 144 mmol/L    Potassium 4.7 3.4 - 5.3 mmol/L    Chloride 103 94 - 109 mmol/L    Carbon Dioxide 24 20 - 32 mmol/L    Anion Gap 10 3 - 14 mmol/L    Glucose 161 (H) 70 - 99 mg/dL    Urea Nitrogen 28 7 - 30 mg/dL    Creatinine 1.51 (H) 0.52 - 1.04 mg/dL    GFR Estimate 31 (L) >60 mL/min/[1.73_m2]    GFR Estimate If Black 36 (L) >60 mL/min/[1.73_m2]    Calcium 9.7 8.5 - 10.1 mg/dL

## 2019-03-05 ASSESSMENT — ASTHMA QUESTIONNAIRES: ACT_TOTALSCORE: 22

## 2019-03-05 NOTE — RESULT ENCOUNTER NOTE
Please notify patient by sending following letter with copy of test results      Mert Pizarro,    This is to inform you regarding your test result.    Ferritin which is iron stores in the body is normal.  Chronic kidney disease is slightly worse  Stay well hydrated .  CBC result which includes Hemoglobin and  Platelet Counts is satisfactory.  HbA1c which is average glucose during last 3 months is 6.4%.  Your diabetes is under control.        Sincerely,      Dr.Nasima Kerline MD,FACP

## 2019-03-15 ENCOUNTER — OFFICE VISIT (OUTPATIENT)
Dept: URGENT CARE | Facility: URGENT CARE | Age: 84
End: 2019-03-15
Payer: COMMERCIAL

## 2019-03-15 VITALS
OXYGEN SATURATION: 91 % | HEART RATE: 75 BPM | WEIGHT: 284 LBS | BODY MASS INDEX: 44.48 KG/M2 | DIASTOLIC BLOOD PRESSURE: 78 MMHG | SYSTOLIC BLOOD PRESSURE: 148 MMHG

## 2019-03-15 DIAGNOSIS — M79.18 MUSCULOSKELETAL PAIN: Primary | ICD-10-CM

## 2019-03-15 PROCEDURE — 96372 THER/PROPH/DIAG INJ SC/IM: CPT | Performed by: FAMILY MEDICINE

## 2019-03-15 PROCEDURE — 99214 OFFICE O/P EST MOD 30 MIN: CPT | Mod: 25 | Performed by: FAMILY MEDICINE

## 2019-03-15 RX ORDER — KETOROLAC TROMETHAMINE 30 MG/ML
30 INJECTION, SOLUTION INTRAMUSCULAR; INTRAVENOUS ONCE
Status: COMPLETED | OUTPATIENT
Start: 2019-03-15 | End: 2019-03-15

## 2019-03-15 RX ORDER — METAXALONE 800 MG/1
800 TABLET ORAL 3 TIMES DAILY
Qty: 30 TABLET | Refills: 0 | Status: SHIPPED | OUTPATIENT
Start: 2019-03-15 | End: 2019-04-01

## 2019-03-15 RX ADMIN — KETOROLAC TROMETHAMINE 30 MG: 30 INJECTION, SOLUTION INTRAMUSCULAR; INTRAVENOUS at 13:25

## 2019-03-15 NOTE — PROGRESS NOTES
SUBJECTIVE:   Moira Andre is a 85 year old female presenting with a chief complaint of   Chief Complaint   Patient presents with     Urgent Care     Pt states severe back pain sxs    This is more in the shoulder area on the left side.. Not associated with an injury but she does a lot of work she is constantly lifting .  She remembers no particular time where she may have injured herself    .    Over the last week is been feeling stiff and painful.  Pain is not waking her up at night     She is an established patient of [facility].        Review of Systems   Musculoskeletal:        Shoulder pain     All other systems reviewed and are negative.      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 Father          at 8o's, ? prostate ca     Breast Cancer No family hx of      Cancer - colorectal No family hx of      Current Outpatient Medications   Medication Sig Dispense  Refill     metaxalone (SKELAXIN) 800 MG tablet Take 1 tablet (800 mg) by mouth 3 times daily 30 tablet 0     acetaminophen (TYLENOL) 325 MG tablet Take 325-650 mg by mouth every 6 hours as needed for mild pain       ADVAIR DISKUS 100-50 MCG/DOSE diskus inhaler INHALE 1 PUFF EVERY 12 HOURS 3 Inhaler 3     albuterol (PROAIR HFA/PROVENTIL HFA/VENTOLIN HFA) 108 (90 BASE) MCG/ACT Inhaler Inhale 2 puffs into the lungs every 6 hours as needed for shortness of breath / dyspnea 1 Inhaler 3     atorvastatin (LIPITOR) 20 MG tablet Take 1 tablet (20 mg) by mouth daily 90 tablet 3     blood glucose monitoring (NO BRAND SPECIFIED) meter device kit Use to test blood sugar 1-2 times daily or as directed. 1 kit 0     Blood Glucose Monitoring Suppl (TRUE METRIX AIR GLUCOSE METER) W/DEVICE KIT USE AS DIRECTED 1 kit 0     buPROPion (WELLBUTRIN SR) 100 MG 12 hr tablet Take 1 tablet (100 mg) by mouth 2 times daily 180 tablet 3     Cholecalciferol (VITAMIN D) 2000 UNITS CAPS Take 2,000 Units by mouth daily        clopidogrel (PLAVIX) 75 MG tablet Take 1 tablet (75 mg) by mouth daily (Patient taking differently: Take 75 mg by mouth every evening ) 90 tablet 3     DULoxetine (CYMBALTA) 60 MG capsule Take 1 capsule (60 mg) by mouth daily 90 capsule 1     fluticasone (FLONASE) 50 MCG/ACT nasal spray Spray 1 spray into both nostrils daily as needed        glimepiride (AMARYL) 2 MG tablet Take 1 tablet (2 mg) by mouth every morning (before breakfast) 90 tablet 1     HYDROcodone-acetaminophen (NORCO) 5-325 MG per tablet Take 1 tablet by mouth every 8 hours as needed for moderate to severe pain 90 tablet 0     IBANdronate (BONIVA) 150 MG tablet TAKE 1 TABLET EVERY 30 DAYS 3 tablet 3     losartan (COZAAR) 50 MG tablet TAKE 1 TABLET EVERY DAY (STOP LISINOPRIL)  90 tablet 1     Menthol, Topical Analgesic, (ICY HOT EX) Apply to affected area every morning       metoprolol succinate (TOPROL-XL) 100 MG 24 hr tablet Take 100 mg by mouth daily        neomycin-polymyxin-hydrocortisone (CORTISPORIN) otic solution Place 4 drops into both ears 4 times daily (Patient taking differently: Place 4 drops into both ears 4 times daily as needed (Ear infections from swimming) ) 10 mL 0     nitroGLYcerin (NITROSTAT) 0.4 MG sublingual tablet 1 tab po in clinic 1 tablet 0     nystatin-triamcinolone (MYCOLOG II) cream APPLY TOPICALLY TWICE DAILY (Patient taking differently: APPLY TOPICALLY DAILY PRN) 60 g 1     order for DME DREAMSTATION  5-15 CM/H20  NASAL WISP FABRIC       ranitidine (ZANTAC) 150 MG tablet Take 1 tablet (150 mg) by mouth 2 times daily 180 tablet 1     TRUE METRIX BLOOD GLUCOSE TEST test strip USE TO TEST DAILY 100 strip 1     TRUEPLUS LANCETS 28G MISC 1 each 2 times daily as needed 100 each 3     Social History     Tobacco Use     Smoking status: Former Smoker     Packs/day: 0.25     Types: Cigarettes     Start date:      Last attempt to quit: 1973     Years since quittin.9     Smokeless tobacco: Never Used   Substance Use Topics     Alcohol use: Yes     Alcohol/week: 0.0 - 0.6 oz     Comment: rarely       OBJECTIVE  /78   Pulse 75   Wt 128.8 kg (284 lb)   SpO2 91%   BMI 44.48 kg/m      Physical Exam   Constitutional: She is oriented to person, place, and time. She appears well-developed.   HENT:   Head: Normocephalic.   Eyes: Pupils are equal, round, and reactive to light. EOM are normal.   Neck: Normal range of motion.   Cardiovascular: Normal rate.   Pulmonary/Chest: Effort normal.   Abdominal: Soft.   Musculoskeletal: Normal range of motion.   All normal except for the affected shoulder.  She has decreased range of motion.  She has a stable shoulder       Neurological: She is alert and oriented to person, place, and time.   Skin: Skin is warm.   Nursing note and vitals reviewed.      Labs:  No results found for this or any previous visit (from the past 24 hour(s)).    X-Ray was not done.    ASSESSMENT:      ICD-10-CM    1. Acute  pain of left shoulder M25.512 metaxalone (SKELAXIN) 800 MG tablet   Certainly could have rotator cuff injury    She feels that this is muscular in nature and I agree with her.  She was wanting a shot of medication we came up with Toradol and we will give her 30 mg.  It did say that she had a hard time secondary to aspirin.  I further asked her if she has ibuprofen in the past and she says she has with no problem of hives is just that ibuprofen does not seem to work for her.    Since she has been able to use another anti-inflammatory and it seems that she is had allergy associated with what ever may have been compounded with the aspirin and yellow dye your some other type of component I think we would be okay and giving her shot of Toradol..  In addition we will use a muscle relaxant    Medical Decision Making:    Differential Diagnosis:  Musculoskeletal back pain, disc disease    Serious Comorbid Conditions:  Adult:  Diabetes    PLAN:  1 Skelaxin muscle relaxant.   2 Toradol IM here in clinic.       Followup:    She needs to call her primary care and follow-up within the next week or so    There are no Patient Instructions on file for this visit.      25 minutes in exam and counseling.  Greater than 50% of the time in counseling in regards to her injury and treatment.

## 2019-03-16 ENCOUNTER — HOSPITAL ENCOUNTER (EMERGENCY)
Facility: CLINIC | Age: 84
Discharge: HOME | End: 2019-03-16
Attending: EMERGENCY MEDICINE | Admitting: EMERGENCY MEDICINE
Payer: COMMERCIAL

## 2019-03-16 ENCOUNTER — APPOINTMENT (OUTPATIENT)
Dept: MRI IMAGING | Facility: CLINIC | Age: 84
End: 2019-03-16
Attending: EMERGENCY MEDICINE
Payer: COMMERCIAL

## 2019-03-16 VITALS
DIASTOLIC BLOOD PRESSURE: 100 MMHG | BODY MASS INDEX: 43.95 KG/M2 | TEMPERATURE: 97.8 F | RESPIRATION RATE: 18 BRPM | SYSTOLIC BLOOD PRESSURE: 146 MMHG | HEIGHT: 67 IN | OXYGEN SATURATION: 97 % | WEIGHT: 280 LBS | HEART RATE: 82 BPM

## 2019-03-16 DIAGNOSIS — M54.2 NECK PAIN: ICD-10-CM

## 2019-03-16 LAB
ANION GAP SERPL CALCULATED.3IONS-SCNC: 9 MMOL/L (ref 3–14)
BASOPHILS # BLD AUTO: 0 10E9/L (ref 0–0.2)
BASOPHILS NFR BLD AUTO: 0.1 %
BUN SERPL-MCNC: 33 MG/DL (ref 7–30)
CALCIUM SERPL-MCNC: 8.3 MG/DL (ref 8.5–10.1)
CHLORIDE SERPL-SCNC: 106 MMOL/L (ref 94–109)
CO2 SERPL-SCNC: 22 MMOL/L (ref 20–32)
CREAT BLD-MCNC: 1.9 MG/DL (ref 0.52–1.04)
CREAT SERPL-MCNC: 1.74 MG/DL (ref 0.52–1.04)
DIFFERENTIAL METHOD BLD: ABNORMAL
EOSINOPHIL # BLD AUTO: 0 10E9/L (ref 0–0.7)
EOSINOPHIL NFR BLD AUTO: 0.3 %
ERYTHROCYTE [DISTWIDTH] IN BLOOD BY AUTOMATED COUNT: 14.7 % (ref 10–15)
GFR SERPL CREATININE-BSD FRML MDRD: 25 ML/MIN/{1.73_M2}
GFR SERPL CREATININE-BSD FRML MDRD: 26 ML/MIN/{1.73_M2}
GLUCOSE SERPL-MCNC: 179 MG/DL (ref 70–99)
HCT VFR BLD AUTO: 38.7 % (ref 35–47)
HGB BLD-MCNC: 12.5 G/DL (ref 11.7–15.7)
IMM GRANULOCYTES # BLD: 0 10E9/L (ref 0–0.4)
IMM GRANULOCYTES NFR BLD: 0.2 %
INTERPRETATION ECG - MUSE: NORMAL
LYMPHOCYTES # BLD AUTO: 1.1 10E9/L (ref 0.8–5.3)
LYMPHOCYTES NFR BLD AUTO: 7.2 %
MCH RBC QN AUTO: 27.1 PG (ref 26.5–33)
MCHC RBC AUTO-ENTMCNC: 32.3 G/DL (ref 31.5–36.5)
MCV RBC AUTO: 84 FL (ref 78–100)
MONOCYTES # BLD AUTO: 0.8 10E9/L (ref 0–1.3)
MONOCYTES NFR BLD AUTO: 5.5 %
NEUTROPHILS # BLD AUTO: 12.7 10E9/L (ref 1.6–8.3)
NEUTROPHILS NFR BLD AUTO: 86.7 %
NRBC # BLD AUTO: 0 10*3/UL
NRBC BLD AUTO-RTO: 0 /100
PLATELET # BLD AUTO: 298 10E9/L (ref 150–450)
POTASSIUM SERPL-SCNC: 4.1 MMOL/L (ref 3.4–5.3)
RBC # BLD AUTO: 4.62 10E12/L (ref 3.8–5.2)
SODIUM SERPL-SCNC: 137 MMOL/L (ref 133–144)
TROPONIN I SERPL-MCNC: <0.015 UG/L (ref 0–0.04)
WBC # BLD AUTO: 14.7 10E9/L (ref 4–11)

## 2019-03-16 PROCEDURE — 72141 MRI NECK SPINE W/O DYE: CPT

## 2019-03-16 PROCEDURE — 82565 ASSAY OF CREATININE: CPT

## 2019-03-16 PROCEDURE — 84484 ASSAY OF TROPONIN QUANT: CPT | Performed by: EMERGENCY MEDICINE

## 2019-03-16 PROCEDURE — 25000132 ZZH RX MED GY IP 250 OP 250 PS 637: Performed by: EMERGENCY MEDICINE

## 2019-03-16 PROCEDURE — 80048 BASIC METABOLIC PNL TOTAL CA: CPT | Performed by: EMERGENCY MEDICINE

## 2019-03-16 PROCEDURE — 99285 EMERGENCY DEPT VISIT HI MDM: CPT | Mod: 25

## 2019-03-16 PROCEDURE — 85025 COMPLETE CBC W/AUTO DIFF WBC: CPT | Performed by: EMERGENCY MEDICINE

## 2019-03-16 PROCEDURE — 93005 ELECTROCARDIOGRAM TRACING: CPT

## 2019-03-16 RX ORDER — LORAZEPAM 2 MG/ML
0.25 INJECTION INTRAMUSCULAR ONCE
Status: DISCONTINUED | OUTPATIENT
Start: 2019-03-16 | End: 2019-03-16 | Stop reason: HOSPADM

## 2019-03-16 RX ORDER — CYCLOBENZAPRINE HCL 10 MG
10 TABLET ORAL 3 TIMES DAILY PRN
Qty: 20 TABLET | Refills: 0 | Status: SHIPPED | OUTPATIENT
Start: 2019-03-16 | End: 2019-04-01

## 2019-03-16 RX ORDER — CYCLOBENZAPRINE HCL 10 MG
10 TABLET ORAL ONCE
Status: COMPLETED | OUTPATIENT
Start: 2019-03-16 | End: 2019-03-16

## 2019-03-16 RX ORDER — HYDROCODONE BITARTRATE AND ACETAMINOPHEN 5; 325 MG/1; MG/1
1 TABLET ORAL EVERY 6 HOURS PRN
Qty: 10 TABLET | Refills: 0 | Status: SHIPPED | OUTPATIENT
Start: 2019-03-16 | End: 2019-04-01

## 2019-03-16 RX ORDER — KETOROLAC TROMETHAMINE 15 MG/ML
15 INJECTION, SOLUTION INTRAMUSCULAR; INTRAVENOUS ONCE
Status: DISCONTINUED | OUTPATIENT
Start: 2019-03-16 | End: 2019-03-16

## 2019-03-16 RX ADMIN — CYCLOBENZAPRINE HYDROCHLORIDE 10 MG: 10 TABLET, FILM COATED ORAL at 07:39

## 2019-03-16 ASSESSMENT — ENCOUNTER SYMPTOMS
MYALGIAS: 1
SHORTNESS OF BREATH: 0
NECK STIFFNESS: 1
CHILLS: 0
FEVER: 0
HEADACHES: 0
BACK PAIN: 1
NECK PAIN: 1

## 2019-03-16 ASSESSMENT — MIFFLIN-ST. JEOR: SCORE: 1747.7

## 2019-03-16 NOTE — ED NOTES
PT sitting up at side of stretcher. Pt states unable to eat d/t pain in lt arm and BP cuff on rt arm. BP cuff removed. MD notified of pt c/o.

## 2019-03-16 NOTE — ED NOTES
1224: Pt ambulated down [place] to bathroom. Minimal SBA for pt. Pt instructed to pull call light in bathroom when done.

## 2019-03-16 NOTE — ED PROVIDER NOTES
History     Chief Complaint:  Neck pain    HPI   Moira Andre is a 85 year old female with a history of diabetes, hypertension, TIA on Plavix, and chronic pain who presents with neck pain. The patient states that 4 days ago she was at home filling out greeting cards when she began to experience a posterior neck pain that seemed like a stiffness. The pain was constant since onset, with intermittent jolting shooting pains. This pain began radiating down her bilateral posterior/lateral arms, terminating at the elbow, and was worse on the left compared to the right. It also began shooting along her bilateral collar bones and into her left lateral neck. She was seen at Urgent care yesterday where she was given a shot of Toradol with very effective relief as well as sent home with Skelaxin (muscle relaxant). The patient has Norco at home she can take and tried it this morning which seemed to only minimally help her pain. The patient currently describes severe pain and is tearful here, requesting further IM shots of Toradol. She otherwise denies any numbness/weakness in her arms. She denies any chest pain, shortness of breath, numbness, weakness, headache.     Allergies:  Aspirin  Lisinopril  Metformin  Minocycline  Yellow Dye      Medications:    Tylenol  Lipitor  Norco  Skelaxin  Wellbutrin  Plavix  Cymbalta  Amaryl  Cozaar  Toprol-XL  Nitroglycerin  Ranitidine    Past Medical History:    Osteoarthritis  Rheumatoid arthritis  ACS  Tremor  Kidney stones  Chronic pain  Anxiety  Hypertension  GERD  Anemia  Moderate major depression  ELIGIO on CPAP  Transient cerebral ischemia  Type 2 diabetes  Hyperlipidemia  CKD stage III  Morbid obesity  Ventral hernia  CHF  Lactic acidosis    Past Surgical History:    Appendectomy  Breast biopsy  Cholecystectomy  Coronary angiography x2  Left heart catheterization  JURGEN  Orthopedic surgery  Release carpal tunnel  Right femoral artery pseudoaneurysm repair    Family History:   "  MS    Social History:  Smoking Status: Former Smoker  Alcohol Use: Yes  Patient presents alone  Marital Status:        Review of Systems   Constitutional: Negative for chills and fever.   Respiratory: Negative for shortness of breath.    Cardiovascular: Negative for chest pain.   Musculoskeletal: Positive for back pain, myalgias, neck pain and neck stiffness.   Neurological: Negative for headaches.   All other systems reviewed and are negative.        Physical Exam     Patient Vitals for the past 24 hrs:   BP Temp Temp src Heart Rate Resp SpO2 Height Weight   03/16/19 0650 (!) 173/93 97.8  F (36.6  C) Oral 89 20 95 % 1.702 m (5' 7\") 127 kg (280 lb)      Physical Exam  General/Appearance: appears stated age, well-groomed, appears very uncomfortable and tearful, distractible, elevated BMI  Eyes: EOMI, no scleral injection, no icterus  ENT: MMM  Neck: supple, nl ROM, no stiffness, no reproducible ttp to paraspinous/lateral neck mm/trapezius, no midline posterior cervical ttp  Cardiovascular: RRR, nl S1S2, no m/r/g, 2+ pulses in all 4 extremities, cap refill <2sec  Respiratory: CTAB, good air movement throughout, no wheezes/rhonchi/rales, no increased WOB, no retractions  Back: no lesions  GI: abd soft, non-distended, nttp,  no HSM, no rebound, no guarding, nl BS  MSK: MEREDITH, good tone, no bony abnormality  Skin: warm and well-perfused, no rash, no edema, no ecchymosis, nl turgor  Neuro: GCS 15, alert and oriented, no gross focal neuro deficits, CN 2-12 intact, strength 5/5 BUE, nl gait, nl sensation to all 4 ext  Psych: anxious and tearful  Heme: no petechia, no purpura, no active bleeding      Emergency Department Course     ECG (7:24:35):  Rate 77 bpm. ID interval 316. QRS duration 100. QT/QTc 410/463. P-R-T axes 7 6 -13 78. Sinus rhythm with sinus arrhythmia. Low voltage QRS. Cannot rule out anterior infarct, age undetermined. Abnormal ECG. No significant change when compared to EKG dated 11/1/18. " Interpreted at 0730 by Janel Thorne MD.     Imaging:  Radiographic findings were communicated with the patient who voiced understanding of the findings.    MR Cervical spine w/o contrast:  1. Scan quality is degraded due to significant motion artifact.  2. Multilevel degenerative change. When compared to the previous scan,  there is been no significant change.  3. No focal disc herniations are identified.  4. Arterial dissection cannot be excluded on these images. There is is  2 much patient motion to be able to evaluate the vertebral arteries.  As read by Radiology.     Laboratory:  0726 - Troponin: <0.015   CBC: WBC 14.7 (H), o/w WNL (HGB 12.5, )   BMP: Glucose 179 (H), BUN 33 (H), Ca 8.3 (L), Creatinine 1.74 (H), o/w WNL  Creatinine POCT: 1.9 (H)    Interventions:  0739 - Flexeril 10 mg PO    Emergency Department Course:  Past medical records, nursing notes, and vitals reviewed.  0600: I performed an exam of the patient and obtained history, as documented above.      IV inserted and blood drawn.     1001: I rechecked patient. Explained plan.    The patient was sent for a MRI while in the emergency department, findings above.     1057: I rechecked the patient. Repeat exam shows reproducible tenderness to palpation along the left lateral scapula.      1122: Patient is sitting on bed, eating. No complaints of pain.    1137: Nurse Hafsa reports patient is now complaining pain is uncontrolled again and is unable to eat due to pain.    1140: I reviewed MN . She has extensive Norco prescriptions over the past several months. I rechecked the patient. She is only complaining of left upper arm pain. Findings and plan explained to the Patient. Patient discharged home with instructions regarding supportive care, medications, and reasons to return. The importance of close follow-up was reviewed.      Impression & Plan      Medical Decision Making:  This patient is an 85-year-old female, on Plavix, who  presents with left upper extremity/shoulder/neck pain that radiates into her clavicles.  The history and physical are difficult as she has very labile mood but also seems to be very distractible.  Initially symptoms did not appear to be reproducible however on repeat exams pain was reproducible to palpation and very limited and isolated to her left upper lateral extremity.  I suspect this is MSK/radicular in nature.  MRI cervical spine does not show any concerning pathology, though.  Although I think is very unlikely, what I would be most concerned about would be possible arterial dissection, similar to vertebral artery dissection.  This is purely based on the degree of pain the patient stated she was feeling.  That being said when she was not being watched she was calm and sleeping, or otherwise sitting at the edge of the bed comfortably.  Ideally I would have done a CTA however her kidney function is such that this would not be safe.  I think the risk of a dissection is so low that the risk to her kidneys with contrast would be greater.  Unfortunately there was a enough motion artifact on the MRI cervical spine that there was not any ability to really see the vertebral arteries, even though this was not a study aimed at them.  I also considered cardiac however as this appears to be, again with repeated physical exams, reproducible to palpation I think this is less likely.  Additionally her EKG and troponin were negative.  Her white count was elevated however there is no midline tenderness to palpation, nuchal rigidity, altered mental status, fever to suggest epidural infection or other central serious bacterial infection.  At this time the plan is to send the patient home with pain meds and muscle relaxants however I have encouraged her to follow-up with her PCP for further diagnostics as needed.    Diagnosis:    ICD-10-CM    1. Neck pain M54.2        Discharge Medications:     cyclobenzaprine 10 MG  tablet  Commonly known as:  FLEXERIL  10 mg, Oral, 3 TIMES DAILY PRN            Italo Colin  3/16/2019    EMERGENCY DEPARTMENT  I, Italo Colin, am serving as a scribe at 11:37 AM on 3/16/2019 to document services personally performed by Janel Thorne MD based on my observations and the provider's statements to me.       Janel Thorne MD  03/16/19 0457

## 2019-03-16 NOTE — ED AVS SNAPSHOT
Emergency Department  [address] [place] [id number]  Phone:  [phone]  Fax:  [phone]                                    Moira Andre   MRN: [id number]    Department:   Emergency Department   Date of Visit:  3/16/2019           After Visit Summary Signature Page    I have received my discharge instructions, and my questions have been answered. I have discussed any challenges I see with this plan with the nurse or doctor.    ..........................................................................................................................................  Patient/Patient Representative Signature      ..........................................................................................................................................  Patient Representative Print Name and Relationship to Patient    ..................................................               ................................................  Date                                   Time    ..........................................................................................................................................  Reviewed by Signature/Title    ...................................................              ..............................................  Date                                               Time          22EPIC Rev 08/18

## 2019-03-21 ENCOUNTER — OFFICE VISIT (OUTPATIENT)
Dept: FAMILY MEDICINE | Facility: CLINIC | Age: 84
End: 2019-03-21
Payer: COMMERCIAL

## 2019-03-21 VITALS
BODY MASS INDEX: 43.16 KG/M2 | HEART RATE: 127 BPM | HEIGHT: 67 IN | SYSTOLIC BLOOD PRESSURE: 148 MMHG | TEMPERATURE: 97.3 F | OXYGEN SATURATION: 95 % | DIASTOLIC BLOOD PRESSURE: 86 MMHG | WEIGHT: 275 LBS

## 2019-03-21 DIAGNOSIS — G89.29 CHRONIC BILATERAL LOW BACK PAIN WITH RIGHT-SIDED SCIATICA: ICD-10-CM

## 2019-03-21 DIAGNOSIS — M54.41 CHRONIC BILATERAL LOW BACK PAIN WITH RIGHT-SIDED SCIATICA: ICD-10-CM

## 2019-03-21 DIAGNOSIS — M54.2 NECK PAIN: Primary | ICD-10-CM

## 2019-03-21 PROCEDURE — 99213 OFFICE O/P EST LOW 20 MIN: CPT | Performed by: NURSE PRACTITIONER

## 2019-03-21 ASSESSMENT — MIFFLIN-ST. JEOR: SCORE: 1725.02

## 2019-03-21 NOTE — PROGRESS NOTES
HPI    SUBJECTIVE:   Moira Andre is a 85 year old female who presents to clinic today for the following health issues:      Stiff neck X 2 weeks     Migratory Neck pain. First on the right and now today is on the left   She is very scared about this. She's been nauseous that she actually vomited because the the nerves   No paresthesias   She has no new symptoms today, the pain just changes location  She had an excellent eval in the ED 5 days ago including a rather normal MRI of c-spine.     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 Father          at 8o's, ? prostate ca     Breast Cancer No family hx of      Cancer - colorectal No family hx of      Past Surgical History:   Procedure Laterality Date     APPENDECTOMY       BIOPSY BREAST      x2 -needle & lumpectomy-benign     CHOLECYSTECTOMY       CORONARY ANGIOGRAPHY ADULT ORDER  2007    Bare metal stent to  OM1, Diagonal patent      CORONARY ANGIOGRAPHY ADULT ORDER  2007    [place] stent to Diagonal     HC LEFT HEART CATHETERIZATION  2013    Moderate CAD     HYSTERECTOMY TOTAL ABDOMINAL       ORTHOPEDIC SURGERY      knee replacement on right side (), Left side ()     RELEASE CARPAL TUNNEL      right and left     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     Social History     Tobacco Use     Smoking status: Former Smoker     Packs/day: 0.25     Types: Cigarettes     Start date:      Last attempt to quit: 1973     Years since quittin.9     Smokeless tobacco: Never Used   Substance Use Topics     Alcohol use: Yes     Alcohol/week: 0.0 - 0.6 oz     Comment: rarely     Current Outpatient Medications   Medication Sig Dispense Refill     acetaminophen (TYLENOL) 325 MG tablet Take 325-650 mg by mouth every 6 hours as needed for mild pain       ADVAIR DISKUS 100-50 MCG/DOSE diskus inhaler INHALE 1 PUFF EVERY 12 HOURS 3 Inhaler 3     albuterol (PROAIR HFA/PROVENTIL HFA/VENTOLIN HFA) 108 (90 BASE) MCG/ACT Inhaler Inhale 2 puffs into the lungs every 6 hours as needed for shortness of breath / dyspnea 1 Inhaler 3     atorvastatin (LIPITOR) 20 MG tablet Take 1 tablet (20 mg) by mouth daily 90 tablet 3     blood glucose monitoring (NO BRAND SPECIFIED) meter device kit Use to test blood sugar 1-2 times daily or as directed. 1 kit 0     Blood Glucose Monitoring Suppl (TRUE METRIX AIR GLUCOSE METER) W/DEVICE KIT USE AS DIRECTED 1 kit 0     buPROPion (WELLBUTRIN SR) 100 MG 12 hr tablet Take 1 tablet (100 mg) by mouth 2 times daily 180 tablet 3     Cholecalciferol (VITAMIN D) 2000 UNITS CAPS Take 2,000 Units by mouth daily        clopidogrel (PLAVIX) 75 MG tablet Take 1 tablet (75 mg) by mouth daily (Patient taking differently: Take 75 mg by mouth every evening ) 90 tablet 3     DULoxetine (CYMBALTA) 60 MG capsule Take 1 capsule (60 mg) by mouth daily 90 capsule 1     fluticasone (FLONASE) 50  MCG/ACT nasal spray Spray 1 spray into both nostrils daily as needed        glimepiride (AMARYL) 2 MG tablet Take 1 tablet (2 mg) by mouth every morning (before breakfast) 90 tablet 1     HYDROcodone-acetaminophen (NORCO) 5-325 MG per tablet Take 1 tablet by mouth every 8 hours as needed for moderate to severe pain 90 tablet 0     IBANdronate (BONIVA) 150 MG tablet TAKE 1 TABLET EVERY 30 DAYS 3 tablet 3     losartan (COZAAR) 50 MG tablet TAKE 1 TABLET EVERY DAY (STOP LISINOPRIL)  90 tablet 1     Menthol, Topical Analgesic, (ICY HOT EX) Apply to affected area every morning       metaxalone (SKELAXIN) 800 MG tablet Take 1 tablet (800 mg) by mouth 3 times daily 30 tablet 0     metoprolol succinate (TOPROL-XL) 100 MG 24 hr tablet Take 100 mg by mouth daily       neomycin-polymyxin-hydrocortisone (CORTISPORIN) otic solution Place 4 drops into both ears 4 times daily (Patient taking differently: Place 4 drops into both ears 4 times daily as needed (Ear infections from swimming) ) 10 mL 0     nystatin-triamcinolone (MYCOLOG II) cream APPLY TOPICALLY TWICE DAILY (Patient taking differently: APPLY TOPICALLY DAILY PRN) 60 g 1     order for DME DREAMSTATION  5-15 CM/H20  NASAL WISP FABRIC       ranitidine (ZANTAC) 150 MG tablet Take 1 tablet (150 mg) by mouth 2 times daily 180 tablet 1     TRUE METRIX BLOOD GLUCOSE TEST test strip USE TO TEST DAILY 100 strip 1     TRUEPLUS LANCETS 28G MISC 1 each 2 times daily as needed 100 each 3     nitroGLYcerin (NITROSTAT) 0.4 MG sublingual tablet 1 tab po in clinic 1 tablet 0     Allergies   Allergen Reactions     Aspirin Hives     Reaction occurred during childhood.      Lisinopril Cough     Metformin      Elevated lactic acid     Minocycline      Yellow Dye Allergy. Minocycline has Yellow Dye #10.     Yellow Dye Hives     Rxn to yellow tablet. Eyes swelled shut.        Reviewed and updated as needed this visit by clinical staff and provider     ROS  Detailed as above     /86 (BP  "Location: Left arm, Cuff Size: Adult Regular)   Pulse 127   Temp 97.3  F (36.3  C) (Oral)   Ht 1.702 m (5' 7\")   Wt 124.7 kg (275 lb)   SpO2 95%   BMI 43.07 kg/m     Physical Exam   Constitutional: She appears well-developed.   Neck:   Very limited ROM   Pulmonary/Chest: Effort normal.   Neurological: She is alert.   Skin: Skin is warm and dry.   Psychiatric: Judgment normal. Her affect is labile.   Occasional crying       Assessment and Plan:       ICD-10-CM    1. Neck pain M54.2 [facility], HAND, AND CHIROPRACTIC REFERRAL     Migratory neck pain for a couple weeks. Excellent neg workup in the ED completed 5 days ago. As the pain is migratory, neg workup and no new symptoms today, suspect this is more muscular in nature. Will send to PT for eval and treatment.       Justine Mckeon, APRN, CNP  [facility]   "

## 2019-03-29 ENCOUNTER — THERAPY VISIT (OUTPATIENT)
Dept: PHYSICAL THERAPY | Facility: CLINIC | Age: 84
End: 2019-03-29
Payer: COMMERCIAL

## 2019-03-29 DIAGNOSIS — M54.2 NECK PAIN: ICD-10-CM

## 2019-03-29 DIAGNOSIS — M54.2 NECK PAIN ON LEFT SIDE: ICD-10-CM

## 2019-03-29 PROCEDURE — 97140 MANUAL THERAPY 1/> REGIONS: CPT | Mod: GP | Performed by: PHYSICAL THERAPIST

## 2019-03-29 PROCEDURE — 97161 PT EVAL LOW COMPLEX 20 MIN: CPT | Mod: GP | Performed by: PHYSICAL THERAPIST

## 2019-03-29 NOTE — PROGRESS NOTES
[facility] Initial Evaluation  Subjective:    Moira Andre is a 85 year old female with a cervical spine condition.  Condition occurred with:  A fall/slip.  Condition occurred: at home.  This is a new condition  Pt presents to the clinic with complaints of left sided neck pain. Pt reported onset of sx's a couple of weeks ago; reported ER visit on 3-16-19 due severe neck pain. Pt reported several days prior to the ER visit she was leaning forwards to  something from the floor when she fell forwards, hitting her head against the microwave. Pt did not feel she hit her head hard but did make contact with her head to the microwave.  Pt reported visit to the  on 3-15-19 due to severity of sx's; was given a shot of toradol which alleviated sx's significantly. Pt reported return of sx's so went to the ER the next day. Pt reported sx's were initially on the right and now are on the left; denies UE sx's at present. Pt reports history of lumbar spinal stenosis; multiple GE[place]GIA's to the low back in the past for pain relief-last injection however not helpful. Currently reporting greater sx's in the right buttock vs the neck.    Patient reports pain:  Cervical left side.  Radiates to:  None.   and is intermittent Pain Scale: up to 3/10.   Pain is worse during the day.  Symptoms are exacerbated by rotating head, driving and looking up or down and relieved by heat.  Since onset symptoms are gradually improving.  Special tests:  X-ray and MRI.      General health as reported by patient is good.                  Barriers include:  Lives alone.    Red flags:  None as reported by the patient.                        Objective:  Standing Alignment:    Cervical/Thoracic:  Thoracic kyphosis increased (increased CT kyphosis)                Gait:  Pt arrived to clinic with use of rolling walker. Pt reported increased use of walker for the past 6 months. Pt reported not using the walker within her  apartment  Assistive Devices:  Walker                      Cervical/Thoracic Evaluation    AROM:  AROM Cervical:    Flexion:            Minimal loss  Extension:       Moderate to significant loss; increased left sided neck pain  Rotation:         Left: moderate loss, increased left sided neck pain     Right: minimal loss  Side Bend:      Left: significant loss     Right:  Significant loss                      Spinal Segmental Conclusions:  Generalized hypomobility cervical spine                                                  General     ROS    Assessment/Plan:    Patient is a 85 year old female with cervical complaints.    Patient has the following significant findings with corresponding treatment plan.                Diagnosis 1:  Left sided neck pain  Pain -  manual therapy, self management, education and home program  Decreased ROM/flexibility - manual therapy and therapeutic exercise  Decreased joint mobility - manual therapy and therapeutic exercise  Decreased function - therapeutic activities    Therapy Evaluation Codes:   1) History comprised of:   Personal factors that impact the plan of care:      Age.    Comorbidity factors that impact the plan of care are:      Osteoarthritis and Overweight.     Medications impacting care: None.  2) Examination of Body Systems comprised of:   Body structures and functions that impact the plan of care:      Cervical spine.   Activity limitations that impact the plan of care are:      Bathing, Driving, Dressing and Lifting.  3) Clinical presentation characteristics are:   Stable/Uncomplicated.  4) Decision-Making    Low complexity using standardized patient assessment instrument and/or measureable assessment of functional outcome.  Cumulative Therapy Evaluation is: Low complexity.    Previous and current functional limitations:  (See Goal Flow Sheet for this information)    Short term and Long term goals: (See Goal Flow Sheet for this information)     Communication  ability:  Patient appears to be able to clearly communicate and understand verbal and written communication and follow directions correctly.  Treatment Explanation - The following has been discussed with the patient:   RX ordered/plan of care  Anticipated outcomes  Possible risks and side effects  This patient would benefit from PT intervention to resume normal activities.   Rehab potential is good.    Frequency:  1 X week, once daily  Duration:  for 6 weeks  Discharge Plan:  Independent in home treatment program.  Reach maximal therapeutic benefit.    Please refer to the daily flowsheet for treatment today, total treatment time and time spent performing 1:1 timed codes.

## 2019-04-01 ENCOUNTER — OFFICE VISIT (OUTPATIENT)
Dept: FAMILY MEDICINE | Facility: CLINIC | Age: 84
End: 2019-04-01
Payer: COMMERCIAL

## 2019-04-01 VITALS
HEIGHT: 67 IN | BODY MASS INDEX: 43.47 KG/M2 | OXYGEN SATURATION: 96 % | TEMPERATURE: 97.8 F | DIASTOLIC BLOOD PRESSURE: 80 MMHG | HEART RATE: 72 BPM | SYSTOLIC BLOOD PRESSURE: 140 MMHG | WEIGHT: 277 LBS

## 2019-04-01 DIAGNOSIS — M06.041 RHEUMATOID ARTHRITIS INVOLVING BOTH HANDS WITH NEGATIVE RHEUMATOID FACTOR (H): ICD-10-CM

## 2019-04-01 DIAGNOSIS — D72.829 LEUKOCYTOSIS, UNSPECIFIED TYPE: ICD-10-CM

## 2019-04-01 DIAGNOSIS — I10 ESSENTIAL HYPERTENSION: ICD-10-CM

## 2019-04-01 DIAGNOSIS — N18.30 CKD (CHRONIC KIDNEY DISEASE) STAGE 3, GFR 30-59 ML/MIN (H): ICD-10-CM

## 2019-04-01 DIAGNOSIS — E11.21 TYPE 2 DIABETES MELLITUS WITH DIABETIC NEPHROPATHY, WITHOUT LONG-TERM CURRENT USE OF INSULIN (H): ICD-10-CM

## 2019-04-01 DIAGNOSIS — Z86.79 HISTORY OF CORONARY ARTERY DISEASE: ICD-10-CM

## 2019-04-01 DIAGNOSIS — R07.9 CHEST PAIN, UNSPECIFIED TYPE: ICD-10-CM

## 2019-04-01 DIAGNOSIS — M25.50 MULTIPLE JOINT PAIN: ICD-10-CM

## 2019-04-01 DIAGNOSIS — M06.042 RHEUMATOID ARTHRITIS INVOLVING BOTH HANDS WITH NEGATIVE RHEUMATOID FACTOR (H): ICD-10-CM

## 2019-04-01 DIAGNOSIS — M54.10 RADICULAR LOW BACK PAIN: Primary | ICD-10-CM

## 2019-04-01 LAB
BASOPHILS # BLD AUTO: 0 10E9/L (ref 0–0.2)
BASOPHILS NFR BLD AUTO: 0.3 %
DIFFERENTIAL METHOD BLD: ABNORMAL
EOSINOPHIL # BLD AUTO: 0.1 10E9/L (ref 0–0.7)
EOSINOPHIL NFR BLD AUTO: 1.6 %
ERYTHROCYTE [DISTWIDTH] IN BLOOD BY AUTOMATED COUNT: 15.1 % (ref 10–15)
HCT VFR BLD AUTO: 39.8 % (ref 35–47)
HGB BLD-MCNC: 12.4 G/DL (ref 11.7–15.7)
LYMPHOCYTES # BLD AUTO: 1.9 10E9/L (ref 0.8–5.3)
LYMPHOCYTES NFR BLD AUTO: 21 %
MCH RBC QN AUTO: 26.5 PG (ref 26.5–33)
MCHC RBC AUTO-ENTMCNC: 31.2 G/DL (ref 31.5–36.5)
MCV RBC AUTO: 85 FL (ref 78–100)
MONOCYTES # BLD AUTO: 0.7 10E9/L (ref 0–1.3)
MONOCYTES NFR BLD AUTO: 8.4 %
NEUTROPHILS # BLD AUTO: 6.1 10E9/L (ref 1.6–8.3)
NEUTROPHILS NFR BLD AUTO: 68.7 %
PLATELET # BLD AUTO: 366 10E9/L (ref 150–450)
RBC # BLD AUTO: 4.68 10E12/L (ref 3.8–5.2)
WBC # BLD AUTO: 8.9 10E9/L (ref 4–11)

## 2019-04-01 PROCEDURE — 36415 COLL VENOUS BLD VENIPUNCTURE: CPT | Performed by: INTERNAL MEDICINE

## 2019-04-01 PROCEDURE — 86140 C-REACTIVE PROTEIN: CPT | Performed by: INTERNAL MEDICINE

## 2019-04-01 PROCEDURE — 99214 OFFICE O/P EST MOD 30 MIN: CPT | Performed by: INTERNAL MEDICINE

## 2019-04-01 PROCEDURE — 85025 COMPLETE CBC W/AUTO DIFF WBC: CPT | Performed by: INTERNAL MEDICINE

## 2019-04-01 PROCEDURE — 82565 ASSAY OF CREATININE: CPT | Performed by: INTERNAL MEDICINE

## 2019-04-01 RX ORDER — NITROGLYCERIN 0.4 MG/1
TABLET SUBLINGUAL
Qty: 25 TABLET | Refills: 0 | Status: SHIPPED | OUTPATIENT
Start: 2019-04-01 | End: 2022-03-03

## 2019-04-01 RX ORDER — HYDROCODONE BITARTRATE AND ACETAMINOPHEN 5; 325 MG/1; MG/1
1 TABLET ORAL 2 TIMES DAILY PRN
Qty: 60 TABLET | Refills: 0 | Status: SHIPPED | OUTPATIENT
Start: 2019-04-01 | End: 2020-03-20

## 2019-04-01 ASSESSMENT — MIFFLIN-ST. JEOR: SCORE: 1734.09

## 2019-04-01 NOTE — LETTER
[place] [address] [facility]  [address]  [place]  [address]  Tel: [phone]    April 4, 2019    Moira ALEGRIA Jes  [address] [facility] [address]  [facility] [place] [facility] [address]        Dear Ms. Andre,    This is to inform you regarding your test result.    C-reactive protein which is test to check for inflammation is satisfactory .  CBC result which includes Hemoglobin and  Platelet Counts is satisfactory.    Sincerely,    Maryan Churchill MD/JOSÉ MIGUEL          Enclosure: Lab Results  Results for orders placed or performed in visit on 04/01/19   CBC with platelets and differential   Result Value Ref Range    WBC 8.9 4.0 - 11.0 10e9/L    RBC Count 4.68 3.8 - 5.2 10e12/L    Hemoglobin 12.4 11.7 - 15.7 g/dL    Hematocrit 39.8 35.0 - 47.0 %    MCV 85 78 - 100 fl    MCH 26.5 26.5 - 33.0 pg    MCHC 31.2 (L) 31.5 - 36.5 g/dL    RDW 15.1 (H) 10.0 - 15.0 %    Platelet Count 366 150 - 450 10e9/L    % Neutrophils 68.7 %    % Lymphocytes 21.0 %    % Monocytes 8.4 %    % Eosinophils 1.6 %    % Basophils 0.3 %    Absolute Neutrophil 6.1 1.6 - 8.3 10e9/L    Absolute Lymphocytes 1.9 0.8 - 5.3 10e9/L    Absolute Monocytes 0.7 0.0 - 1.3 10e9/L    Absolute Eosinophils 0.1 0.0 - 0.7 10e9/L    Absolute Basophils 0.0 0.0 - 0.2 10e9/L    Diff Method Automated Method    CRP inflammation   Result Value Ref Range    CRP Inflammation 9.0 (H) 0.0 - 8.0 mg/L

## 2019-04-01 NOTE — PROGRESS NOTES
SUBJECTIVE:   Moira Andre is a 85 year old female who presents to clinic today for the following health issues:    She is complex patient due to multiple comorbidites    Chronic Kidney Disease Follow-up and other medical concerns      Current NSAID use?  No    Amount of exercise or physical activity: None    Problems taking medications regularly: No    Medication side effects: none    Diet: regular (no restrictions)    Patient had a spine injection  Reports it's still very painful  She had a fall about a month ago  The day before, she also had a fan running down her back  Pain persisted since then  She has another injection scheduled for 04/23/19  Hip pain started about 2 weeks ago  Pain is unbearable  Reports that it's affecting her quality of life  Has been using hydrocodone to help her pain  Reports she's been wanting to take it daily  Tylenol isn't helping her pain  Was seen by pain specialist orthopedics    Hypertension  Patient reports that [employer] has changed her losartan  She's unsure what it was changed to  Believes it may have been a different generic  She will bring letter to next office visit    Tinnitus  Reports longstanding tinnitus  Started when she worked at an [employer]  It's been persistent  Worse when it's quiet    Problem list and histories reviewed & adjusted, as indicated.  Additional history: as documented    Medications and labs reviewed in EPIC    Reviewed and updated as needed this visit by clinical staff  Tobacco  Allergies  Meds       Reviewed and updated as needed this visit by Provider       ROS:  Constitutional, HEENT, cardiovascular, pulmonary, GI, , musculoskeletal, neuro, skin, endocrine and psych systems are negative, except as otherwise noted.    POSITIVE for back pain    This document serves as a record of the services and decisions personally performed and made by Maryan Churchill MD. It was created on her behalf by Jeanette Ha, a trained medical scribe. The  "creation of this document is based on the provider's statements to the medical scribe.  Jeanette Briannereginotrevor 2:46 PM April 1, 2019    OBJECTIVE:     /80 (BP Location: Right arm, Patient Position: Sitting, Cuff Size: Adult Large)   Pulse 72   Temp 97.8  F (36.6  C) (Oral)   Ht 1.702 m (5' 7\")   Wt 125.6 kg (277 lb)   SpO2 96%   Breastfeeding? No   BMI 43.38 kg/m    Body mass index is 43.38 kg/m .     GENERAL: morbidly obese, uses assisted walker, alert, distressed  MS: curvature in spine  PSYCH: mentation appears normal, affect anxious/down  She walks with help of walker    Diagnostic Test Results:  No results found for this or any previous visit (from the past 24 hour(s)).    Reviewed and discussed MR lumbar spine done on 07/14/16  ASSESSMENT/PLAN:     Moira was seen today for recheck.    Diagnoses and all orders for this visit:    Radicular low back pain  -     [facility] PT, HAND, AND CHIROPRACTIC REFERRAL; Future  -     CRP inflammation  Patient reports severe lower back pain for past month  She's scheduled for spine injection on 04/23  However, pain is too severe for her to function  Tylenol isn't working well for her  Patient fell about a moth ago  She's unable to function, sit, or stand well  Has been using hydrocodone to help her pain  Patient was very distressed during office visit  Referred patient to PT  She will schedule appointment  MRI from 2016 in [facility] shows a lot of changes in her spine  Referred to rheumatology for further evaluation for her multiple joint pain  She will schedule appointment   Prescribed norco as pain is affecting quality of her life   She has genuine reason for pain  Educated her about side effects.    Multiple joint pain  -     HYDROcodone-acetaminophen (NORCO) 5-325 MG tablet; Take 1 tablet by mouth 2 times daily as needed for moderate to severe pain  -     RHEUMATOLOGY REFERRAL  -     CRP inflammation  See above    Chest pain, unspecified type  -     nitroGLYcerin " (NITROSTAT) 0.4 MG sublingual tablet; For chest pain place 1 tablet under the tongue every 5 minutes for 3 doses. If symptoms persist 5 minutes after 1st dose call 911.  Doing well  Compliant with medication  Uses it as needed    History of coronary artery disease  Past history  Stable    Type 2 diabetes mellitus with diabetic nephropathy, without long-term current use of insulin (H)  Doing well  Compliant with medication  Lab Results   Component Value Date    A1C 6.4 03/04/2019    A1C 7.0 10/31/2018    A1C 7.6 07/04/2018    A1C 7.2 04/03/2018    A1C 6.6 08/15/2017     Essential hypertension  Doing well  Compliant with medication  Reports Mar has changed her losartan  Unsure to what  She will bring letter to next office visit    Leukocytosis, unspecified type  -     CBC with platelets and differential   previous CBC shows leucocytosis.    Patient Instructions   Use norco for severe pain, as needed  Norco is a controlled substance. It can be habit-forming. It should be taken as prescribed. Do not mix it with alcohol. Be careful with driving. Do not lose the prescription. Do not overuse this medication.  Make appointment with PHYSICAL THERAPY   Make appointment with rheumatology  Follow up in one month  Seek sooner medical attention if there is any worsening of symptoms or problems    The information in this document, created by the medical scribe for me, accurately reflects the services I personally performed and the decisions made by me. I have reviewed and approved this document for accuracy prior to leaving the patient care area.  April 1, 2019 3:08 PM    Maryan Churchill MD  [facility]

## 2019-04-01 NOTE — PROGRESS NOTES
[facility] Initial Evaluation  Subjective:                                       Pertinent medical history includes:  Asthma, diabetes, depression, high blood pressure, incontinence, osteoarthritis, overweight and sleep disorder/apnea.  Medical allergies: yes (asprin, yellow dye).  Other surgeries include:  Heart surgery and orthopedic surgery (2 stents, Doug knee replacement).  Current medications:  Anti-depressants and high blood pressure medication.  Current occupation is Retired.    Primary job tasks include:  Prolonged standing, prolonged sitting and repetitive tasks.                                Objective:  System    Physical Exam    General     ROS    Assessment/Plan:

## 2019-04-01 NOTE — LETTER
[address]  [address]  [place]  [address]  Tel: [phone]    April 5, 2019    Moira ALEGRIA Jes  [address] [facility] [address]  [facility] [place] [facility] [address]        Dear Ms. Andre,    This is to inform you regarding your test result.    Chronic kidney disease is stable and slightly better.  If you have any further questions or problems, please contact our office.      Sincerely,    Maryan Churchill MD/JOSÉ MIGUEL          Enclosure: Lab Results  Results for orders placed or performed in visit on 04/01/19   CBC with platelets and differential   Result Value Ref Range    WBC 8.9 4.0 - 11.0 10e9/L    RBC Count 4.68 3.8 - 5.2 10e12/L    Hemoglobin 12.4 11.7 - 15.7 g/dL    Hematocrit 39.8 35.0 - 47.0 %    MCV 85 78 - 100 fl    MCH 26.5 26.5 - 33.0 pg    MCHC 31.2 (L) 31.5 - 36.5 g/dL    RDW 15.1 (H) 10.0 - 15.0 %    Platelet Count 366 150 - 450 10e9/L    % Neutrophils 68.7 %    % Lymphocytes 21.0 %    % Monocytes 8.4 %    % Eosinophils 1.6 %    % Basophils 0.3 %    Absolute Neutrophil 6.1 1.6 - 8.3 10e9/L    Absolute Lymphocytes 1.9 0.8 - 5.3 10e9/L    Absolute Monocytes 0.7 0.0 - 1.3 10e9/L    Absolute Eosinophils 0.1 0.0 - 0.7 10e9/L    Absolute Basophils 0.0 0.0 - 0.2 10e9/L    Diff Method Automated Method    CRP inflammation   Result Value Ref Range    CRP Inflammation 9.0 (H) 0.0 - 8.0 mg/L   Creatinine   Result Value Ref Range    Creatinine 1.42 (H) 0.52 - 1.04 mg/dL    GFR Estimate 33 (L) >60 mL/min/[1.73_m2]    GFR Estimate If Black 39 (L) >60 mL/min/[1.73_m2]

## 2019-04-02 LAB — CRP SERPL-MCNC: 9 MG/L (ref 0–8)

## 2019-04-04 LAB
CREAT SERPL-MCNC: 1.42 MG/DL (ref 0.52–1.04)
GFR SERPL CREATININE-BSD FRML MDRD: 33 ML/MIN/{1.73_M2}

## 2019-04-04 NOTE — RESULT ENCOUNTER NOTE
Please notify patient by sending following letter with copy of test results      Mert Pizarro,    This is to inform you regarding your test result.    C-reactive protein which is test to check for inflammation is satisfactory .  CBC result which includes Hemoglobin and  Platelet Counts is satisfactory.        Sincerely,      Dr.Nasima Kerline MD,FACP

## 2019-04-05 NOTE — RESULT ENCOUNTER NOTE
Please notify patient by sending following letter with copy of test results      Mert Pizarro,    This is to inform you regarding your test result.    Chronic kidney disease is stable and slightly better.    Sincerely,      Dr.Nasima Kerline MD,FACP

## 2019-04-15 ENCOUNTER — APPOINTMENT (OUTPATIENT)
Dept: CT IMAGING | Facility: CLINIC | Age: 84
End: 2019-04-15
Attending: EMERGENCY MEDICINE
Payer: COMMERCIAL

## 2019-04-15 ENCOUNTER — APPOINTMENT (OUTPATIENT)
Dept: GENERAL RADIOLOGY | Facility: CLINIC | Age: 84
End: 2019-04-15
Attending: EMERGENCY MEDICINE
Payer: COMMERCIAL

## 2019-04-15 ENCOUNTER — HOSPITAL ENCOUNTER (EMERGENCY)
Facility: CLINIC | Age: 84
Discharge: HOME | End: 2019-04-15
Attending: EMERGENCY MEDICINE | Admitting: EMERGENCY MEDICINE
Payer: COMMERCIAL

## 2019-04-15 VITALS
HEART RATE: 77 BPM | OXYGEN SATURATION: 96 % | DIASTOLIC BLOOD PRESSURE: 72 MMHG | HEIGHT: 67 IN | RESPIRATION RATE: 16 BRPM | TEMPERATURE: 97.9 F | SYSTOLIC BLOOD PRESSURE: 149 MMHG | BODY MASS INDEX: 42.38 KG/M2 | WEIGHT: 270 LBS

## 2019-04-15 DIAGNOSIS — S16.1XXA STRAIN OF NECK MUSCLE, INITIAL ENCOUNTER: ICD-10-CM

## 2019-04-15 DIAGNOSIS — S60.00XA CONTUSION OF FINGER WITHOUT DAMAGE TO NAIL, UNSPECIFIED FINGER, INITIAL ENCOUNTER: ICD-10-CM

## 2019-04-15 DIAGNOSIS — S02.2XXB OPEN FRACTURE OF NASAL BONE, INITIAL ENCOUNTER: ICD-10-CM

## 2019-04-15 DIAGNOSIS — S09.90XA CLOSED HEAD INJURY, INITIAL ENCOUNTER: ICD-10-CM

## 2019-04-15 PROCEDURE — 25000131 ZZH RX MED GY IP 250 OP 636 PS 637: Performed by: EMERGENCY MEDICINE

## 2019-04-15 PROCEDURE — 12011 RPR F/E/E/N/L/M 2.5 CM/<: CPT

## 2019-04-15 PROCEDURE — 73140 X-RAY EXAM OF FINGER(S): CPT | Mod: RT

## 2019-04-15 PROCEDURE — 21310 ZZHC CLOSED TX NASAL BONE FRACTURE W/O MANIPULATION: CPT

## 2019-04-15 PROCEDURE — 25000132 ZZH RX MED GY IP 250 OP 250 PS 637: Performed by: EMERGENCY MEDICINE

## 2019-04-15 PROCEDURE — 99285 EMERGENCY DEPT VISIT HI MDM: CPT | Mod: 25

## 2019-04-15 PROCEDURE — 70486 CT MAXILLOFACIAL W/O DYE: CPT

## 2019-04-15 PROCEDURE — 70450 CT HEAD/BRAIN W/O DYE: CPT

## 2019-04-15 PROCEDURE — 72125 CT NECK SPINE W/O DYE: CPT

## 2019-04-15 PROCEDURE — 27210282 ZZH ADHESIVE DERMABOND SKIN

## 2019-04-15 RX ORDER — OXYCODONE AND ACETAMINOPHEN 5; 325 MG/1; MG/1
1 TABLET ORAL ONCE
Status: COMPLETED | OUTPATIENT
Start: 2019-04-15 | End: 2019-04-15

## 2019-04-15 RX ORDER — ONDANSETRON 4 MG/1
4 TABLET, ORALLY DISINTEGRATING ORAL ONCE
Status: COMPLETED | OUTPATIENT
Start: 2019-04-15 | End: 2019-04-15

## 2019-04-15 RX ORDER — OXYCODONE AND ACETAMINOPHEN 5; 325 MG/1; MG/1
1-2 TABLET ORAL EVERY 4 HOURS PRN
Qty: 15 TABLET | Refills: 0 | Status: SHIPPED | OUTPATIENT
Start: 2019-04-15 | End: 2020-01-20

## 2019-04-15 RX ADMIN — OXYCODONE HYDROCHLORIDE AND ACETAMINOPHEN 1 TABLET: 5; 325 TABLET ORAL at 10:32

## 2019-04-15 RX ADMIN — ONDANSETRON 4 MG: 4 TABLET, ORALLY DISINTEGRATING ORAL at 11:05

## 2019-04-15 ASSESSMENT — ENCOUNTER SYMPTOMS
SHORTNESS OF BREATH: 0
NECK PAIN: 1
ABDOMINAL PAIN: 0
WOUND: 1
MYALGIAS: 1

## 2019-04-15 ASSESSMENT — MIFFLIN-ST. JEOR: SCORE: 1702.34

## 2019-04-15 NOTE — ED TRIAGE NOTES
Has a walking cart that collapsed in front of patient while putting weight on it resulting in fall forward hitting face. C/O head, nose, neck, finger pain to EMS.

## 2019-04-15 NOTE — ED AVS SNAPSHOT
Emergency Department  [address] [place] [address]  Phone:  [phone]  Fax:  [phone]                                    Moira Andre   MRN: [id number]    Department:   Emergency Department   Date of Visit:  4/15/2019           After Visit Summary Signature Page    I have received my discharge instructions, and my questions have been answered. I have discussed any challenges I see with this plan with the nurse or doctor.    ..........................................................................................................................................  Patient/Patient Representative Signature      ..........................................................................................................................................  Patient Representative Print Name and Relationship to Patient    ..................................................               ................................................  Date                                   Time    ..........................................................................................................................................  Reviewed by Signature/Title    ...................................................              ..............................................  Date                                               Time          22EPIC Rev 08/18

## 2019-04-15 NOTE — ED NOTES
Bed: ED12  Expected date:   Expected time:   Means of arrival:   Comments:  mady - 541 - 85F fall face injury eta 1001

## 2019-04-15 NOTE — DISCHARGE INSTRUCTIONS
Discharge Instructions  Head Injury    You have been seen today for a head injury. Your evaluation included a history and physical examination. You may have had a CT (CAT) scan performed, though most head injuries do not require a scan. Based on this evaluation, your provider today does not feel that your head injury is serious.    Generally, every Emergency Department visit should have a follow-up clinic visit with either a primary or a specialty clinic/provider. Please follow-up as instructed by your emergency provider today.  Return to the Emergency Department if:  You are confused or you are not acting right.  Your headache gets worse or you start to have a really bad headache even with your recommended treatment plan.  You vomit (throw up) more than once.  You have a seizure.  You have trouble walking.  You have weakness or paralysis (cannot move) in an arm or a leg.  You have blood or fluid coming from your ears or nose.  You have new symptoms or anything that worries you.    Sleeping:  It is okay for you to sleep, but someone should wake you up if instructed by your provider, and someone should check on you at your usual time to wake up.     Activity:  Do not drive for at least 24 hours.  Do not drive if you have dizzy spells or trouble concentrating, or remembering things.  Do not return to any contact sports until cleared by your regular provider.     MORE INFORMATION:    Concussion:  A concussion is a minor head injury that may cause temporary problems with the way the brain works. Although concussions are important, they are generally not an emergency or a reason that a person needs to be hospitalized. Some concussion symptoms include confusion, amnesia (forgetful), nausea (sick to your stomach) and vomiting (throwing up), dizziness, fatigue, memory or concentration problems, irritability and sleep problems. For most people, concussions are mild and temporary but some will have more severe and persistent  symptoms that require on-going care and treatment.  CT Scans: Your evaluation today may have included a CT scan (CAT scan) to look for things like bleeding or a skull fracture (broken bone).  CT scans involve radiation and too many CT scans can cause serious health problems like cancer, especially in children.  Because of this, your provider may not have ordered a CT scan today if they think you are at low risk for a serious or life threatening problem.    If you were given a prescription for medicine here today, be sure to read all of the information (including the package insert) that comes with your prescription.  This will include important information about the medicine, its side effects, and any warnings that you need to know about.  The pharmacist who fills the prescription can provide more information and answer questions you may have about the medicine.  If you have questions or concerns that the pharmacist cannot address, please call or return to the Emergency Department.     Remember that you can always come back to the Emergency Department if you are not able to see your regular provider in the amount of time listed above, if you get any new symptoms, or if there is anything that worries you.

## 2019-04-15 NOTE — ED PROVIDER NOTES
History     Chief Complaint:  Fall    HPI   Moira Andre is a 85 year old anticoagulated female on Plavix with a history of diabetes, hypertension, hyperlipidemia, rheumatoid arthritis, osteoarthritis, and CAD who presents to the emergency department via EMS for evaluation of a fall. The patient reports that prior to arrival she was walking outside her apartment using her walker when her walker gave out and she had a mechanical fall onto the cement ground. She was unable to catch herself and directly hit her face on the ground, smashing her glasses, and causing a hematoma to her forehead and a heavy nosebleed. She also complains of neck pain, left knee pain, and right fingertip pain and soreness. She was able to roll over onto her butt after the fall and call EMS. Due to her neck pain, they placed her in C collar and noted her oxygen saturation to be 95%. She denied feeling dizzy or weak before the fall, noting that it was strictly mechanical. She is unable to breath through her nose due to the injuries but otherwise does not have any shortness of breath. The patient denies abdominal pain, chest pain, right knee pain, or any other injuries.     Allergies:  Aspirin  Lisinopril  Metformin  Minocycline  Yellow dye      Medications:    Tylenol  Advair Diskus  Albuterol  Lipitor  Wellbutrin  Plavix  Cymbalta  Flonase  Amaryl  Norco  Boniva  Cozaar  Toprol XL  Cortisporin  Nitrostat  Mycolog II  Zantac   Trueplus lancets      Past Medical History:    Aortic valve sclerosis  Arrhythmia   Asthma  CKD  CHF  Depression  Diabetes type 2  Left ventricle diastolic dysfunction  Hypertension  Lactic acidosis  Migraines  Mitral stenosis  MI  Nephrolithiasis  Osteoarthritis of knee  Obesity  Rheumatoid arthritis  Sleep apnea  TIA  Ventral hernia   CAD  ACS  Tremors  Bilateral edema of LE  Chronic pain  Anxiety  GERD  Anemia  Microalbuminuria  Hyperlipidemia     Past Surgical History:    Appendectomy  Breast  "biopsy  Cholecystectomy  Coronary angiography   Left heart catheterization  Hysterectomy   Knee replacement right  Bilateral carpal tunnel release  Right femoral artery pseudoaneurysm    Family History:    MS  CAD: father    Social History:  Tobacco Use: Former, quit: 4/24/1973  Alcohol Use: Rarely  PCP: Maryan Churchill  Marital Status:        Review of Systems   HENT: Positive for nosebleeds.    Respiratory: Negative for shortness of breath.    Cardiovascular: Negative for chest pain.   Gastrointestinal: Negative for abdominal pain.   Musculoskeletal: Positive for myalgias and neck pain.   Skin: Positive for wound.   All other systems reviewed and are negative.      Physical Exam     Patient Vitals for the past 24 hrs:   BP Temp Temp src Pulse Resp SpO2 Height Weight   04/15/19 1315 149/72 -- -- 77 16 96 % -- --   04/15/19 1022 148/83 97.9  F (36.6  C) Temporal 87 18 94 % 1.702 m (5' 7\") 122.5 kg (270 lb)     Physical Exam     Eye:  Pupils are equal, round, and reactive.  Extraocular movements intact.    ENT: There is significant trauma over the bridge of the nose with tenderness here.  However there is no out right deformity.  She has bleeding from both nares but no evidence of a septal hematoma.  There is mild swelling of her upper lip without bleeding.  She is able to open her jaw without difficulty.  TMs are clear bilaterally.    Cardiac:  Regular rate and rhythm.  No murmurs, gallops, or rubs.    Pulmonary:  Clear to auscultation bilaterally.  No wheezes, rales, or rhonchi.    Abdomen:  Positive bowel sounds.  Abdomen is soft and non-distended, without focal tenderness.    Musculoskeletal:  Normal movement of all extremities without evidence for deficit.  There is a hematoma to the forehead without depression.  Exam of the right hand shows some swelling and ecchymosis to the distal phalanx of the right ring finger with otherwise normal flexion and extension.    Skin:  Warm and dry without rashes.  " There is a 1 cm laceration to the left side of the nose where her glasses rubbed against it.  This is deep to the dermis without gaping and with intermittent venous oozing.    Neurologic: Cranial nerves II through XII are intact.  Non-focal exam without asymmetric weakness or numbness.     Psychiatric:  Normal affect with appropriate interaction with examiner.    Emergency Department Course   Imaging:  CT Facial Bones without contrast:  IMPRESSION: There are comminuted fractures of the nasal bones  bilaterally with depression of the nasal arch. No other facial bone  fractures noted. The paranasal sinuses are well aerated. There is a  moderate-sized forehead hematoma. The orbits and globes bilaterally  are unremarkable.  Reading per radiology.    CT Cervical Spine without contrast:  IMPRESSION: Severe diffuse degenerative changes of the cervical spine.  No evidence for fracture or traumatic malalignment.  Reading per radiology.    Head CT without contrast:  IMPRESSION: Forehead hematoma. Probable bilateral nasal bone  fractures. Diffuse cerebral volume loss and cerebral white matter  changes consistent with chronic small vessel ischemic disease. No  evidence for acute intracranial pathology.    Report per radiology.     Fingers XR, 2-3 views, Right:   IMPRESSION:   1. No visualized acute fracture of the right fourth finger.  2. Moderate and severe degenerative changes within the interphalangeal  joints, carpometacarpal joints, and scaphoid trapezium-trapezoid  joint.  Reading per radiology.     Radiographic findings were communicated with the patient who voiced understanding of the findings.    Procedures:     Laceration Repair      LACERATION:  A simple clean 1 cm laceration.    LOCATION:  Left nose.    FUNCTION:  Distally sensation are intact.    ANESTHESIA:  none.    PREPARATION:  Irrigation with Normal Saline.    DEBRIDEMENT:  no debridement.    CLOSURE:  Wound was closed with  Dermabond.    Interventions:  1032 Percocet 5-325 mg tablet PO  1105 Zofran ODT 4 mg     Emergency Department Course:  1017 Nursing notes and vitals reviewed. I performed an exam of the patient as documented above.     Medicine administered as documented above.    The patient was sent for a facial bones CT, cervical spine CT, head CT, and finger XR while in the emergency department, findings above.     1129 I rechecked the patient and discussed the results of her workup thus far.     1254 I rechecked the patient.    1259  A laceration was performed as outlined in the procedure note above.  The patient tolerated well and there were no complications.    Findings and plan explained to the Patient. Patient discharged home with instructions regarding supportive care, medications, and reasons to return. The importance of close follow-up was reviewed. The patient was prescribed Percocet.    Impression & Plan    Medical Decision Making:  This very unfortunate elderly woman presents after suffering a fall today.  She notes that she utilizes a cart to push in front of her to support her weight where she struggles with low back pain.  The cart collapsed under her today while she was taking out her garbage and she fell forward, striking her face on the ground.  This resulted in a forehead hematoma, bilateral nasal bone fractures, a small laceration beside the nose with some venous oozing, and a swollen upper lip.  She also complained of neck pain, though she has severe chronic neck pain.  Her CT is unremarkable here as is her head CT and facial CT other than the nasal fractures.  She had some consistent oozing from a small laceration between her left bridge of the nose and the eye and this was repaired as above with Dermabond with good relief.    With this, I was concerned about this elderly woman who is a very large size to be safe to be going home.  However, she was able to get out of bed and ambulate without any difficulty.   She feels comfortable going home.  I will send her home with a few tabs of something for pain and I was exceedingly clear there should be no hesitation to return to the ER if she feels that she is unable to care for self or if she has any other emergent concerns.  Advise follow-up with ENT in approximately 2 weeks time if she feels that she is continuing to have difficulty breathing through her nose or if there is any evidence of deformity that she would like repaired.    Diagnosis:    ICD-10-CM    1. Open fracture of nasal bone, initial encounter S02.2XXB    2. Closed head injury, initial encounter S09.90XA    3. Strain of neck muscle, initial encounter S16.1XXA    4. Contusion of finger without damage to nail, unspecified finger, initial encounter S60.00XA        Disposition:  Discharged to home    Discharge Medications:     Medication List      Started    oxyCODONE-acetaminophen 5-325 MG tablet  Commonly known as:  PERCOCET  1-2 tablets, Oral, EVERY 4 HOURS PRN          Scribe Disclosure:  I, Tyree Alcala, am serving as a scribe on 4/15/2019 at 10:17 AM to personally document services performed by Dr. Trierweiler, MD based on my observations and the provider's statements to me.     Tyree Alcala  4/15/2019    EMERGENCY DEPARTMENT       Trierweiler, Chad A, MD  04/15/19 0223

## 2019-04-19 ENCOUNTER — OFFICE VISIT (OUTPATIENT)
Dept: FAMILY MEDICINE | Facility: CLINIC | Age: 84
End: 2019-04-19
Payer: COMMERCIAL

## 2019-04-19 VITALS
DIASTOLIC BLOOD PRESSURE: 79 MMHG | SYSTOLIC BLOOD PRESSURE: 139 MMHG | BODY MASS INDEX: 42.91 KG/M2 | TEMPERATURE: 98.4 F | OXYGEN SATURATION: 96 % | HEART RATE: 82 BPM | WEIGHT: 274 LBS

## 2019-04-19 DIAGNOSIS — S02.2XXD CLOSED FRACTURE OF NASAL BONE WITH ROUTINE HEALING, SUBSEQUENT ENCOUNTER: ICD-10-CM

## 2019-04-19 DIAGNOSIS — M25.562 ACUTE PAIN OF LEFT KNEE: ICD-10-CM

## 2019-04-19 DIAGNOSIS — E11.21 TYPE 2 DIABETES MELLITUS WITH DIABETIC NEPHROPATHY, WITHOUT LONG-TERM CURRENT USE OF INSULIN (H): ICD-10-CM

## 2019-04-19 DIAGNOSIS — Z91.81 PERSONAL HISTORY OF FALL: Primary | ICD-10-CM

## 2019-04-19 DIAGNOSIS — M25.50 MULTIPLE JOINT PAIN: ICD-10-CM

## 2019-04-19 PROCEDURE — 99214 OFFICE O/P EST MOD 30 MIN: CPT | Performed by: INTERNAL MEDICINE

## 2019-04-19 NOTE — PATIENT INSTRUCTIONS
Schedule an appointment for ENT  Apply ice as needed for swelling  Leg elevation as needed   Tylenol and Norco for pain as needed    Avoid activities that will increase risk of falls  You can cancel appointment on May 2nd  Follow up in 2 months  Seek sooner medical attention if there is any worsening of symptoms or problems

## 2019-04-19 NOTE — PROGRESS NOTES
SUBJECTIVE:   Moira Andre is a 85 year old female who presents to clinic today for the following   health issues:    ED/UC Followup:    Facility:  [facility]  Date of visit: 4/15/19  Reason for visit: Fall  Current Status: at home     Patient presented to ER on 04/15/2019 for fall  Reports she was walking outside with walker taking out garbage  Walker gave out and she had a mechanical fall on cement ground  Was unable to catch herself and hit face directly on ground  She smashed her glasses and resulted in hematoma of forehead and heavy epistaxis  A neighbor from same apartment building helped her and called 911  She has resulting neck pain, left knee pain, and right fingertip pain and soreness  CT facial bones noted comminuted fractures of nasal bones bilaterally and depression of nasal arch  CT cervical spine and XR fingers noted degenerative changes  CT head showed forehead hematoma  She had no major bone fracture  Patient was discharged with prescription of oxycodone for pain  ED was hesitant to discharge her to home  However patient was insistent and was comfortable getting home    Reports pain is improved, but continues to persist  Right leg pain and head pain has been bothering her  Relates leg pain radiates to buttocks  Was given prescription of oxycodone in hospital  However she did not use  Has been taking norco and tylenol for pain  She also has facial bruising and bruising on knees and ankles bilaterally  Is on 75 mg Plavix  Notes difficulty breathing  Most likely due to nasal bone fracture  Feels she has been more clumsy lately  Has been using walker at home since her fall  She is concerned she will fall again  Plans to go to pool to help improve balance    Back pain  Patient continues to have back pain  Was referred to PT during last OV on 04/01/2019  Follows with [facility] for back pain  Reports she is scheduled for injection in back with [facility]    Problem list and histories reviewed & adjusted, as  indicated.  Additional history: as documented    Medications and Labs reviewed in EPIC    Reviewed and updated as needed this visit by clinical staff  Tobacco  Allergies  Meds       Reviewed and updated as needed this visit by Provider         ROS:  Constitutional, HEENT, cardiovascular, pulmonary, GI, , musculoskeletal, neuro, skin, endocrine and psych systems are negative, except as otherwise noted.    POSITIVE for right leg pain, head pain, finger tip pain  POSITIVE for ecchymosis on face    This document serves as a record of the services and decisions personally performed and made by Maryan Churchill MD. It was created on her behalf by Cher Lozada, a trained medical scribe. The creation of this document is based on the provider's statements to the medical scribe.  Cher Lozada 2:36 PM April 19, 2019  OBJECTIVE:   Pulse 82   Temp 98.4  F (36.9  C) (Tympanic)   Wt 124.3 kg (274 lb)   SpO2 96%   BMI 42.91 kg/m    Body mass index is 42.91 kg/m .    GENERAL: morbidly obese, alert and no distress  HENT: ear canals and TM's normal, nose and mouth without ulcers or lesions  MS: face bruised, swelling on forehead, walking okay with help of walker, fully alert, some bruising on knee and ankles, no gross musculoskeletal defects noted, no edema  PSYCH: mentation appears normal, affect normal/bright    Diagnostic Test Results:  none     Reviewed and discussed imaging results on 04/15/2019  ASSESSMENT/PLAN:   Moira was seen today for er f/u.    Diagnoses and all orders for this visit:    Personal history of fall  Patient presented to ER on 04/15/2019 for fall  Reports she was taking out garbage when she fell  Could not break her fall, so she fell on face  Had forehead hematoma and heavy epistaxis  Imaging showed she has nasal bone fracture  Did not sustain major fractures  Has neck pain, left knee pain, and right fingertip pain since fall  Was given prescription of oxycodone during discharge  However she has not used yet  She  gave us back prescription of oxycodone  Has been managing pain with norco and tylenol  Now uses walker at home as she is concerned she'll fall again  Notes difficulty breathing, most likely due to nasal fracture and stuffed nose  Referral to otolaryngology, which she will schedule    Closed fracture of nasal bone with routine healing, subsequent encounter  See above  -     OTOLARYNGOLOGY REFERRAL    Multiple joint pain  See above    Acute pain of left knee  See above    Type 2 diabetes mellitus with diabetic nephropathy, without long-term current use of insulin (H)  Stable  Compliant with medication    Patient Instructions   Schedule an appointment for ENT  Apply ice as needed for swelling  Leg elevation as needed   Tylenol and Norco for pain as needed    Avoid activities that will increase risk of falls  You can cancel appointment on May 2nd  Follow up in 2 months  Seek sooner medical attention if there is any worsening of symptoms or problems    The information in this document, created by the medical scribe for me, accurately reflects the services I personally performed and the decisions made by me. I have reviewed and approved this document for accuracy prior to leaving the patient care area.  April 19, 2019 2:56 PM    Maryan Churchill MD  [facility]

## 2019-04-29 ENCOUNTER — TRANSFERRED RECORDS (OUTPATIENT)
Dept: HEALTH INFORMATION MANAGEMENT | Facility: CLINIC | Age: 84
End: 2019-04-29

## 2019-06-19 ENCOUNTER — OFFICE VISIT (OUTPATIENT)
Dept: FAMILY MEDICINE | Facility: CLINIC | Age: 84
End: 2019-06-19
Payer: COMMERCIAL

## 2019-06-19 VITALS
DIASTOLIC BLOOD PRESSURE: 80 MMHG | HEART RATE: 74 BPM | OXYGEN SATURATION: 95 % | WEIGHT: 276.1 LBS | SYSTOLIC BLOOD PRESSURE: 130 MMHG | BODY MASS INDEX: 43.34 KG/M2 | HEIGHT: 67 IN | TEMPERATURE: 97.3 F

## 2019-06-19 DIAGNOSIS — M54.10 RADICULAR LOW BACK PAIN: ICD-10-CM

## 2019-06-19 DIAGNOSIS — F32.1 MODERATE MAJOR DEPRESSION (H): ICD-10-CM

## 2019-06-19 DIAGNOSIS — Z91.81 PERSONAL HISTORY OF FALL: Primary | ICD-10-CM

## 2019-06-19 DIAGNOSIS — I10 ESSENTIAL HYPERTENSION: ICD-10-CM

## 2019-06-19 DIAGNOSIS — E11.21 TYPE 2 DIABETES MELLITUS WITH DIABETIC NEPHROPATHY, WITHOUT LONG-TERM CURRENT USE OF INSULIN (H): ICD-10-CM

## 2019-06-19 DIAGNOSIS — K43.9 VENTRAL HERNIA WITHOUT OBSTRUCTION OR GANGRENE: ICD-10-CM

## 2019-06-19 DIAGNOSIS — E78.5 HYPERLIPIDEMIA LDL GOAL <70: ICD-10-CM

## 2019-06-19 DIAGNOSIS — Z79.899 MEDICATION MANAGEMENT: ICD-10-CM

## 2019-06-19 LAB
AMPHETAMINES UR QL: NOT DETECTED NG/ML
BARBITURATES UR QL SCN: NOT DETECTED NG/ML
BENZODIAZ UR QL SCN: NOT DETECTED NG/ML
BUPRENORPHINE UR QL: NOT DETECTED NG/ML
CANNABINOIDS UR QL: NOT DETECTED NG/ML
COCAINE UR QL SCN: NOT DETECTED NG/ML
D-METHAMPHET UR QL: NOT DETECTED NG/ML
HBA1C MFR BLD: 6.1 % (ref 0–5.6)
METHADONE UR QL SCN: NOT DETECTED NG/ML
OPIATES UR QL SCN: NOT DETECTED NG/ML
OXYCODONE UR QL SCN: NOT DETECTED NG/ML
PCP UR QL SCN: NOT DETECTED NG/ML
PROPOXYPH UR QL: NOT DETECTED NG/ML
TRICYCLICS UR QL SCN: NOT DETECTED NG/ML

## 2019-06-19 PROCEDURE — 99207 C PAF COMPLETED  NO CHARGE: CPT | Performed by: INTERNAL MEDICINE

## 2019-06-19 PROCEDURE — 36415 COLL VENOUS BLD VENIPUNCTURE: CPT | Performed by: INTERNAL MEDICINE

## 2019-06-19 PROCEDURE — 80048 BASIC METABOLIC PNL TOTAL CA: CPT | Performed by: INTERNAL MEDICINE

## 2019-06-19 PROCEDURE — 99214 OFFICE O/P EST MOD 30 MIN: CPT | Performed by: INTERNAL MEDICINE

## 2019-06-19 PROCEDURE — 83036 HEMOGLOBIN GLYCOSYLATED A1C: CPT | Performed by: INTERNAL MEDICINE

## 2019-06-19 PROCEDURE — 80306 DRUG TEST PRSMV INSTRMNT: CPT | Performed by: INTERNAL MEDICINE

## 2019-06-19 PROCEDURE — 84443 ASSAY THYROID STIM HORMONE: CPT | Performed by: INTERNAL MEDICINE

## 2019-06-19 RX ORDER — DULOXETIN HYDROCHLORIDE 60 MG/1
60 CAPSULE, DELAYED RELEASE ORAL DAILY
Qty: 90 CAPSULE | Refills: 3 | Status: SHIPPED | OUTPATIENT
Start: 2019-06-19 | End: 2019-07-10

## 2019-06-19 RX ORDER — ATORVASTATIN CALCIUM 20 MG/1
20 TABLET, FILM COATED ORAL DAILY
Qty: 90 TABLET | Refills: 3 | Status: SHIPPED | OUTPATIENT
Start: 2019-06-19 | End: 2019-07-22

## 2019-06-19 RX ORDER — LOSARTAN POTASSIUM 50 MG/1
50 TABLET ORAL DAILY
Qty: 90 TABLET | Refills: 3 | Status: SHIPPED | OUTPATIENT
Start: 2019-06-19 | End: 2020-01-20

## 2019-06-19 ASSESSMENT — MIFFLIN-ST. JEOR: SCORE: 1730.01

## 2019-06-19 NOTE — LETTER
[facility]  [address] [facility]  [address]  [place]  [address]  Tel: [phone]    June 20, 2019    Moira RAJI Andre  [address]  [place] [place] [facility] [address]        Mert Pizarro,     This is to inform you regarding your test result.     Urine drug screen was negative.   Chronic kidney disease is stable.   TSH which is thyroid hormone is normal.   HbA1c which is average glucose during last 3 months is 6.1%.   Diabetes is under  control       Sincerely,       Dr.Nasima Kerline MD,FACP  / kd     Results for orders placed or performed in visit on 06/19/19   Hemoglobin A1c   Result Value Ref Range    Hemoglobin A1C 6.1 (H) 0 - 5.6 %   Drug Abuse Screen Panel 13, Urine (Pain Care Package)   Result Value Ref Range    Cannabinoids (17-ahd-6-carboxy-9-THC) Not Detected NDET^Not Detected ng/mL    Phencyclidine (Phencyclidine) Not Detected NDET^Not Detected ng/mL    Cocaine (Benzoylecgonine) Not Detected NDET^Not Detected ng/mL    Methamphetamine (d-Methamphetamine) Not Detected NDET^Not Detected ng/mL    Opiates (Morphine) Not Detected NDET^Not Detected ng/mL    Amphetamine (d-Amphetamine) Not Detected NDET^Not Detected ng/mL    Benzodiazepines (Nordiazepam) Not Detected NDET^Not Detected ng/mL    Tricyclic Antidepressants (Desipramine) Not Detected NDET^Not Detected ng/mL    Methadone (Methadone) Not Detected NDET^Not Detected ng/mL    Barbiturates (Butalbital) Not Detected NDET^Not Detected ng/mL    Oxycodone (Oxycodone) Not Detected NDET^Not Detected ng/mL    Propoxyphene (Norpropoxyphene) Not Detected NDET^Not Detected ng/mL    Buprenorphine (Buprenorphine) Not Detected NDET^Not Detected ng/mL   Basic metabolic panel   Result Value Ref Range    Sodium 142 133 - 144 mmol/L    Potassium 4.9 3.4 - 5.3 mmol/L    Chloride 108 94 - 109 mmol/L    Carbon Dioxide 24 20 - 32 mmol/L    Anion Gap 10 3 - 14 mmol/L    Glucose 69 (L) 70 - 99 mg/dL    Urea Nitrogen 22 7 - 30 mg/dL    Creatinine 1.45 (H) 0.52 - 1.04  mg/dL    GFR Estimate 33 (L) >60 mL/min/[1.73_m2]    GFR Estimate If Black 38 (L) >60 mL/min/[1.73_m2]    Calcium 8.8 8.5 - 10.1 mg/dL   TSH with free T4 reflex   Result Value Ref Range    TSH 1.60 0.40 - 4.00 mU/L

## 2019-06-19 NOTE — PATIENT INSTRUCTIONS
Labs today  Follow up in 3 months  Seek sooner medical attention if there is any worsening of symptoms or problems.

## 2019-06-19 NOTE — PROGRESS NOTES
Subjective     Moira Andre is a 85 year old female who presents to clinic today for the following health issues:    HPI     Follow up on diabetes    Past history of fall  She fell at home since last her fall.  She has sciatica and it is flaring up  She does not want to go for physical therapy  She has a huge ventral hernia but does not want to get that repaired  She worry about that due to comorbidities she will develop complications  She is taking all the fall precautions and uses walker  Her sugars are improving  She had questions regarding her medication  She is using her opioids only if absolutely necessary  She is taking her Cymbalta which is helping her depression  She is feeling much better due to the weather also      Reviewed and updated as needed this visit by Provider         Review of Systems   ROS COMP: Constitutional, HEENT, cardiovascular, pulmonary, GI, , musculoskeletal, neuro, skin, endocrine and psych systems are negative, except as otherwise noted.      Objective    There were no vitals taken for this visit.  There is no height or weight on file to calculate BMI.  Physical Exam   She is nice and pleasant comfortable not in any kind of distress she is fully alert awake oriented  She has a huge ventral hernia which is easily visible  She is morbidly obese            Assessment & Plan      Moira was seen today for recheck.    Diagnoses and all orders for this visit:    Personal history of fall  Discussed the fall precaution and encouraged her to use her walker  She has improved remarkably compared to the last time when I saw her where she had bruising all over her face and body  That is clearing up nicely    Type 2 diabetes mellitus with diabetic nephropathy, without long-term current use of insulin (H)  -     Hemoglobin A1c  -     Basic metabolic panel  -     Lipid panel reflex to direct LDL Non-fasting; Future  -     TSH with free T4 reflex    Radicular low back pain  This is a chronic  "pain  She uses opioids sparingly  Educated her about the medication  Offered physical therapy and further evaluation but she declines at this point    Ventral hernia without obstruction or gangrene  She declined for getting fixed surgically as she worry about postop complications and anesthesia complications due to multiple comorbidities  She says her hernia is not bothering her is just that is there    Hyperlipidemia LDL goal <70  -     atorvastatin (LIPITOR) 20 MG tablet; Take 1 tablet (20 mg) by mouth daily    Essential hypertension  -     losartan (COZAAR) 50 MG tablet; Take 1 tablet (50 mg) by mouth daily    Medication management  -     Drug Abuse Screen Panel 13, Urine (Pain Care Package)    Moderate major depression (H)  -     DULoxetine (CYMBALTA) 60 MG capsule; Take 1 capsule (60 mg) by mouth daily  Doing well with the medication    Other orders  -     PAF COMPLETED      He has not made the appointment with her rheumatologist yet  There is rheumatoid arthritis diagnosis which is questionable.    BMI:   Estimated body mass index is 43.24 kg/m  as calculated from the following:    Height as of this encounter: 1.702 m (5' 7\").    Weight as of this encounter: 125.2 kg (276 lb 1.6 oz).   Weight management plan: Discussed healthy diet and exercise guidelines    Disclaimer: This note consists of symbols derived from keyboarding, dictation and/or voice recognition software. As a result, there may be errors in the script that have gone undetected. Please consider this when interpreting information found in this chart.      Patient Instructions   Labs today  Follow up in 3 months  Seek sooner medical attention if there is any worsening of symptoms or problems.      Return in about 3 months (around 9/19/2019) for Hypertension, medication follow up, Diabetes.    Maryan Churchill MD  [facility]        "

## 2019-06-20 LAB
ANION GAP SERPL CALCULATED.3IONS-SCNC: 10 MMOL/L (ref 3–14)
BUN SERPL-MCNC: 22 MG/DL (ref 7–30)
CALCIUM SERPL-MCNC: 8.8 MG/DL (ref 8.5–10.1)
CHLORIDE SERPL-SCNC: 108 MMOL/L (ref 94–109)
CO2 SERPL-SCNC: 24 MMOL/L (ref 20–32)
CREAT SERPL-MCNC: 1.45 MG/DL (ref 0.52–1.04)
GFR SERPL CREATININE-BSD FRML MDRD: 33 ML/MIN/{1.73_M2}
GLUCOSE SERPL-MCNC: 69 MG/DL (ref 70–99)
POTASSIUM SERPL-SCNC: 4.9 MMOL/L (ref 3.4–5.3)
SODIUM SERPL-SCNC: 142 MMOL/L (ref 133–144)
TSH SERPL DL<=0.005 MIU/L-ACNC: 1.6 MU/L (ref 0.4–4)

## 2019-06-20 NOTE — RESULT ENCOUNTER NOTE
Please notify patient by sending following letter with copy of test results      Mert Pizarro,    This is to inform you regarding your test result.    Urine drug screen was negative.  Chronic kidney disease is stable.  TSH which is thyroid hormone is normal.  HbA1c which is average glucose during last 3 months is 6.1%.  Diabetes is under  control      Sincerely,      Dr.Nasima Kerline MD,FACP

## 2019-07-10 DIAGNOSIS — F32.1 MODERATE MAJOR DEPRESSION (H): ICD-10-CM

## 2019-07-10 RX ORDER — DULOXETIN HYDROCHLORIDE 60 MG/1
60 CAPSULE, DELAYED RELEASE ORAL DAILY
Qty: 90 CAPSULE | Refills: 1 | Status: SHIPPED | OUTPATIENT
Start: 2019-07-10 | End: 2020-01-20

## 2019-07-10 NOTE — TELEPHONE ENCOUNTER
"Pending Prescriptions:                       Disp   Refills    DULoxetine (CYMBALTA) 60 MG capsule [Phar*90 cap*1            Sig: TAKE 1 CAPSULE (60 MG) BY MOUTH DAILY    Last Written Prescription Date:  6/19/19  Last Fill Quantity: 90,  # refills: 3   Last office visit: 6/19/2019 with prescribing provider:     Future Office Visit:   Next 5 appointments (look out 90 days)    Sep 20, 2019 12:30 PM CDT  Office Visit with Maryan Churchill MD  [facility] ([facility] [address] [facility] [id number] [phone]         Requested Prescriptions   Pending Prescriptions Disp Refills     DULoxetine (CYMBALTA) 60 MG capsule [Pharmacy Med Name: DULOXETINE HCL 60 MG Capsule Delayed Release Particles] 90 capsule 1     Sig: TAKE 1 CAPSULE (60 MG) BY MOUTH DAILY       Serotonin-Norepinephrine Reuptake Inhibitors  Passed - 7/10/2019  1:43 PM        Passed - Blood pressure under 140/90 in past 12 months     BP Readings from Last 3 Encounters:   06/19/19 130/80   04/19/19 139/79   04/15/19 149/72                 Passed - PHQ-9 score of less than 5 in past 6 months     Please review last PHQ-9 score.           Passed - Medication is active on med list        Passed - Patient is age 18 or older        Passed - No active pregnancy on record        Passed - No positive pregnancy test in past 12 months        Passed - Recent (6 mo) or future (30 days) visit within the authorizing provider's specialty     Patient had office visit in the last 6 months or has a visit in the next 30 days with authorizing provider or within the authorizing provider's specialty.  See \"Patient Info\" tab in inbasket, or \"Choose Columns\" in Meds & Orders section of the refill encounter.              "

## 2019-07-16 PROBLEM — M54.2 NECK PAIN ON LEFT SIDE: Status: RESOLVED | Noted: 2019-03-29 | Resolved: 2019-07-16

## 2019-07-22 DIAGNOSIS — E78.5 HYPERLIPIDEMIA LDL GOAL <70: ICD-10-CM

## 2019-07-22 NOTE — TELEPHONE ENCOUNTER
"atorvastatin (LIPITOR) 20 MG tablet    Last Written Prescription Date:  06/19/2019  Last Fill Quantity: 90,  # refills: 3   Last office visit: 6/19/2019 with prescribing provider:  Kerline   Future Office Visit:   Next 5 appointments (look out 90 days)    Sep 20, 2019 12:30 PM CDT  Office Visit with Maryan Churchill MD  [facility] [address] [facility] [id number] [phone]           Requested Prescriptions   Pending Prescriptions Disp Refills     atorvastatin (LIPITOR) 20 MG tablet [Pharmacy Med Name: ATORVASTATIN CALCIUM 20 MG Tablet] 90 tablet 3     Sig: TAKE 1 TABLET EVERY DAY       Statins Protocol Passed - 7/22/2019  4:44 PM        Passed - LDL on file in past 12 months     Recent Labs   Lab Test 08/06/18  0936   LDL 46             Passed - No abnormal creatine kinase in past 12 months     Recent Labs   Lab Test 08/24/18  0726   CKT 52                Passed - Recent (12 mo) or future (30 days) visit within the authorizing provider's specialty     Patient had office visit in the last 12 months or has a visit in the next 30 days with authorizing provider or within the authorizing provider's specialty.  See \"Patient Info\" tab in inbasket, or \"Choose Columns\" in Meds & Orders section of the refill encounter.              Passed - Medication is active on med list        Passed - Patient is age 18 or older        Passed - No active pregnancy on record        Passed - No positive pregnancy test in past 12 months          "

## 2019-07-24 RX ORDER — ATORVASTATIN CALCIUM 20 MG/1
TABLET, FILM COATED ORAL
Qty: 90 TABLET | Refills: 3 | Status: SHIPPED | OUTPATIENT
Start: 2019-07-24 | End: 2020-08-24

## 2019-07-25 DIAGNOSIS — K21.9 GASTROESOPHAGEAL REFLUX DISEASE WITHOUT ESOPHAGITIS: ICD-10-CM

## 2019-07-25 DIAGNOSIS — R05.8 DRY COUGH: ICD-10-CM

## 2019-07-25 DIAGNOSIS — J45.20 INTERMITTENT ASTHMA, UNCOMPLICATED: ICD-10-CM

## 2019-07-26 NOTE — TELEPHONE ENCOUNTER
"ranitidine (ZANTAC) 150 MG tablet 180 tablet 1 11/8/2018     Last Written Prescription Date:  11/08/2018  Last Fill Quantity: 180,  # refills: 1      WIXELA INHUB 100-50 MCG/DOSE Aerosol Powder Breath Activated]    Last Written Prescription Date:  Unknown  Last Fill Quantity: UNknown,  # refills: UNknown   Last office visit: 6/19/2019 with prescribing provider:     Future Office Visit:   Next 5 appointments (look out 90 days)    Sep 20, 2019 12:30 PM CDT  Office Visit with aMryan Churchill MD  [facility] [address] [facility] [id number] [phone]           Requested Prescriptions   Pending Prescriptions Disp Refills     WIXELA INHUB 100-50 MCG/DOSE inhaler [Pharmacy Med Name: WIXELA INHUB 100-50 MCG/DOSE Aerosol Powder Breath Activated]  3     Sig: INHALE 1 PUFF EVERY 12 HOURS       Inhaled Steroids Protocol Passed - 7/25/2019  9:38 PM        Passed - Patient is age 12 or older        Passed - Asthma control assessment score within normal limits in last 6 months     Please review ACT score.           Passed - Medication is active on med list        Passed - Recent (6 mo) or future (30 days) visit within the authorizing provider's specialty     Patient had office visit in the last 6 months or has a visit in the next 30 days with authorizing provider or within the authorizing provider's specialty.  See \"Patient Info\" tab in inbasket, or \"Choose Columns\" in Meds & Orders section of the refill encounter.            ranitidine (ZANTAC) 150 MG tablet [Pharmacy Med Name: RANITIDINE  MG Tablet] 180 tablet 1     Sig: TAKE 1 TABLET TWICE DAILY       H2 Blockers Protocol Passed - 7/25/2019  9:38 PM        Passed - Patient is age 12 or older        Passed - Recent (12 mo) or future (30 days) visit within the authorizing provider's specialty     Patient had office visit in the last 12 months or has a visit in the next 30 days with authorizing provider or within " "the authorizing provider's specialty.  See \"Patient Info\" tab in inbasket, or \"Choose Columns\" in Meds & Orders section of the refill encounter.              Passed - Medication is active on med list          "

## 2019-07-26 NOTE — TELEPHONE ENCOUNTER
I reached Moira to see if she has ever been on Wixela and she said no. I called [employer] and it is a new generic for advair.     Prescription approved per [facility] Refill Protocol.  Leila Sanchez RN- Triage [employer]

## 2019-08-15 ENCOUNTER — OFFICE VISIT (OUTPATIENT)
Dept: FAMILY MEDICINE | Facility: CLINIC | Age: 84
End: 2019-08-15
Payer: COMMERCIAL

## 2019-08-15 VITALS
HEART RATE: 77 BPM | HEIGHT: 67 IN | OXYGEN SATURATION: 97 % | DIASTOLIC BLOOD PRESSURE: 78 MMHG | BODY MASS INDEX: 43.32 KG/M2 | WEIGHT: 276 LBS | TEMPERATURE: 97 F | SYSTOLIC BLOOD PRESSURE: 138 MMHG

## 2019-08-15 DIAGNOSIS — L30.9 DERMATITIS: Primary | ICD-10-CM

## 2019-08-15 DIAGNOSIS — B35.3 TINEA PEDIS OF LEFT FOOT: ICD-10-CM

## 2019-08-15 DIAGNOSIS — E11.21 TYPE 2 DIABETES MELLITUS WITH DIABETIC NEPHROPATHY, WITHOUT LONG-TERM CURRENT USE OF INSULIN (H): ICD-10-CM

## 2019-08-15 PROCEDURE — 99214 OFFICE O/P EST MOD 30 MIN: CPT | Performed by: INTERNAL MEDICINE

## 2019-08-15 RX ORDER — CLOTRIMAZOLE 1 %
CREAM (GRAM) TOPICAL 2 TIMES DAILY
Qty: 45 G | Refills: 1 | Status: SHIPPED | OUTPATIENT
Start: 2019-08-15 | End: 2021-08-27

## 2019-08-15 RX ORDER — TRIAMCINOLONE ACETONIDE 1 MG/G
CREAM TOPICAL 2 TIMES DAILY
Qty: 60 G | Refills: 1 | Status: SHIPPED | OUTPATIENT
Start: 2019-08-15 | End: 2021-02-17

## 2019-08-15 ASSESSMENT — MIFFLIN-ST. JEOR: SCORE: 1729.56

## 2019-08-15 NOTE — PROGRESS NOTES
Subjective     Moira Andre is a 85 year old female who presents to clinic today for the following health issues:    HPI   Pt presents to the clinic with a seated walker  Rash on foot  Pt notes that the rash has improved since last week  Pt uses an OTC topical medication which she is unable to recall the name, but it has been helping with the rash  Pt inquires about diabetic shoes  Recalls her last ophthalmologist appointment was a few months ago  Pt is using Advair for asthma  Claims she is not using her albuterol as frequently now      Patient Active Problem List   Diagnosis     CKD (chronic kidney disease) stage 3, GFR 30-59 ml/min (H)     Morbid obesity (H)     CAD (coronary artery disease)     Type 2 diabetes mellitus with diabetic nephropathy (H)     Transient cerebral ischemia     Hyperlipidemia LDL goal <70     Ventral hernia     Intermittent asthma     Moderate major depression (H)     ELIGIO on CPAP     Microalbuminuria     S/P total knee arthroplasty     OA (osteoarthritis) of knee     Anemia due to blood loss, acute     Health Care Home     Essential hypertension     Gastroesophageal reflux disease without esophagitis     Bilateral edema of lower extremity     Osteoarthritis     Rheumatoid arthritis involving both hands with negative rheumatoid factor (H)     High risk medication use     Anxiety     Other chronic pain     Controlled substance agreement signed     Edema of lower extremity     Nephrolithiasis     Kidney stone     Tremor     Atypical chest pain     ACS (acute coronary syndrome) (H)     Non-rheumatic mitral valve stenosis     Coronary artery calcification seen on CAT scan     Past Surgical History:   Procedure Laterality Date     APPENDECTOMY       BIOPSY BREAST      x2 -needle & lumpectomy-benign     CHOLECYSTECTOMY       CORONARY ANGIOGRAPHY ADULT ORDER  9/28/2007    Bare metal stent to OM1, Diagonal patent      CORONARY ANGIOGRAPHY ADULT ORDER  9/25/2007    Valdez stent to Diagonal     HC  LEFT HEART CATHETERIZATION  2013    Moderate CAD     HYSTERECTOMY TOTAL ABDOMINAL       ORTHOPEDIC SURGERY      knee replacement on right side (), Left side ()     RELEASE CARPAL TUNNEL      right and left     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 Father          at 8o's, ? prostate ca     Breast Cancer No family hx of      Cancer - colorectal No family hx of          Current Outpatient Medications   Medication Sig Dispense Refill     acetaminophen (TYLENOL) 325 MG tablet Take 325-650 mg by mouth every 6 hours as needed for mild pain       atorvastatin (LIPITOR) 20 MG tablet TAKE 1 TABLET EVERY DAY 90 tablet 3     blood glucose monitoring (NO BRAND SPECIFIED) meter device kit Use to test blood sugar 1-2 times daily or as directed. 1 kit 0     Blood Glucose Monitoring Suppl (TRUE METRIX AIR GLUCOSE METER) W/DEVICE KIT USE AS DIRECTED 1 kit 0     buPROPion (WELLBUTRIN SR) 100 MG 12 hr tablet Take 1 tablet (100 mg) by mouth 2 times daily 180 tablet 3     Cholecalciferol (VITAMIN D) 2000 UNITS CAPS Take 2,000 Units by mouth daily        clopidogrel (PLAVIX) 75 MG tablet Take 1 tablet (75 mg) by mouth daily (Patient taking differently: Take 75 mg by mouth every evening ) 90 tablet 3     clotrimazole (LOTRIMIN) 1 % external cream Apply topically 2 times daily 45 g 1     DULoxetine (CYMBALTA) 60 MG capsule TAKE 1 CAPSULE (60 MG) BY MOUTH DAILY 90 capsule 1     fluticasone (FLONASE) 50 MCG/ACT nasal spray Spray 1 spray into both nostrils daily as needed         glimepiride (AMARYL) 2 MG tablet TAKE 1 TABLET (2 MG) BY MOUTH EVERY MORNING (BEFORE BREAKFAST) 90 tablet 1     HYDROcodone-acetaminophen (NORCO) 5-325 MG tablet Take 1 tablet by mouth 2 times daily as needed for moderate to severe pain 60 tablet 0     IBANdronate (BONIVA) 150 MG tablet TAKE 1 TABLET EVERY 30 DAYS 3 tablet 3     losartan (COZAAR) 50 MG tablet Take 1 tablet (50 mg) by mouth daily 90 tablet 3     Menthol, Topical Analgesic, (ICY HOT EX) Apply to affected area every morning       metoprolol succinate (TOPROL-XL) 100 MG 24 hr tablet Take 100 mg by mouth daily       neomycin-polymyxin-hydrocortisone (CORTISPORIN) otic solution Place 4 drops into both ears 4 times daily (Patient taking differently: Place 4 drops into both ears 4 times daily as needed (Ear infections from swimming) ) 10 mL 0     nitroGLYcerin (NITROSTAT) 0.4 MG sublingual tablet For chest pain place 1 tablet under the tongue every 5 minutes for 3 doses. If symptoms persist 5 minutes after 1st dose call 911. 25 tablet 0     nystatin-triamcinolone (MYCOLOG II) 743229-5.1 UNIT/GM-% external cream APPLY TOPICALLY DAILY PRN 60 g 1     order for DME Equipment being ordered: diabetic shoes 1 each 0     order for DME DREAMSTATION  5-15 CM/H20  NASAL WISP FABRIC       oxyCODONE-acetaminophen (PERCOCET) 5-325 MG tablet Take 1-2 tablets by mouth every 4 hours as needed for pain 15 tablet 0     ranitidine (ZANTAC) 150 MG tablet TAKE 1 TABLET TWICE DAILY 180 tablet 2     triamcinolone (KENALOG) 0.1 % external cream Apply topically 2 times daily On rash for you foot 60 g 1     TRUE METRIX BLOOD GLUCOSE TEST test strip USE TO TEST DAILY 100 strip 1     TRUEPLUS LANCETS 28G MISC 1 each 2 times daily as needed 100 each 3     WIXELA INHUB 100-50 MCG/DOSE inhaler INHALE 1 PUFF EVERY 12 HOURS 180 Inhaler 3       Reviewed and updated as needed this visit by Provider       Review of Systems   ROS COMP: Constitutional, HEENT, cardiovascular, pulmonary, GI,  ", musculoskeletal, neuro, endocrine and psych systems are negative, except as otherwise noted.    This document serves as a record of the services and decisions personally performed and made by Maryan Churchill MD. It was created on his behalf by Marito Merritt, a trained medical scribe. The creation of this document is based on the provider's statements to the medical scribe.  Marito Merritt August 15, 2019 10:11 AM        Objective    BP (!) 145/77 (BP Location: Right arm, Patient Position: Chair, Cuff Size: Adult Large)   Pulse 77   Temp 97  F (36.1  C) (Oral)   Ht 1.702 m (5' 7\")   Wt 125.2 kg (276 lb)   SpO2 97%   Breastfeeding? No   BMI 43.23 kg/m    Body mass index is 43.23 kg/m .     Physical Exam   GENERAL APPEARANCE: healthy, alert and no distress  FEET: dermatitis on the lateral aspect of the left foot, tinea pedis of left foot      Diagnostic Test Results:  Labs reviewed in Epic  none        Assessment & Plan     Moira was seen today for derm problem.    Diagnoses and all orders for this visit:    Dermatitis  Comments:  on left foot  Orders:  -     triamcinolone (KENALOG) 0.1 % external cream; Apply topically 2 times daily On rash for you foot     Educated about steroid use  Derm referral if no improvement.    Tinea pedis of left foot  -     clotrimazole (LOTRIMIN) 1 % external cream; Apply topically 2 times daily    Type 2 diabetes mellitus with diabetic nephropathy, without long-term current use of insulin (H)  -     order for DME; Equipment being ordered: diabetic shoes       Lab Results   Component Value Date    A1C 6.1 06/19/2019    A1C 6.4 03/04/2019    A1C 7.0 10/31/2018    A1C 7.6 07/04/2018    A1C 7.2 04/03/2018 06/30/2016 MA Screening    Discussed ophthalmology appointments and offered a referral, but pt declined.      BMI: s  Estimated body mass index is 43.23 kg/m  as calculated from the following:    Height as of this encounter: 1.702 m (5' 7\").    Weight as of this encounter: " 125.2 kg (276 lb).   Weight management plan: Discussed healthy diet and exercise guidelines        FUTURE APPOINTMENTS:       - Follow-up visit in September    Return in about 1 month (around 9/15/2019) for medication follow up.     The information in this document, created by the medical scribe for me, accurately reflects the services I personally performed and the decisions made by me. I have reviewed and approved this document for accuracy prior to leaving the patient care area.  August 15, 2019 10:28 AM    Maryan Churchill MD  [facility]

## 2019-08-15 NOTE — PATIENT INSTRUCTIONS
Use lotrimin cream for athelet feet  Use triamcinolone cream for rash on left foot  Keep appointment for 9/2019 as scheduled  Seek sooner medical attention if there is any worsening of symptoms or problems.

## 2019-08-15 NOTE — LETTER
My Asthma Action Plan  Name: Moira Andre   YOB: 1933  Date: 8/15/2019   My doctor: Maryan Churchill MD   My clinic: [facility]        My Control Medicine: advair  My Rescue Medicine: albuterol   My Asthma Severity: intermittent  Avoid your asthma triggers: smoke and upper respiratory infections               GREEN ZONE   Good Control    I feel good    No cough or wheeze    Can work, sleep and play without asthma symptoms       Take your asthma control medicine every day.     1. If exercise triggers your asthma, take your rescue medication    15 minutes before exercise or sports, and    During exercise if you have asthma symptoms  2. Spacer to use with inhaler: If you have a spacer, make sure to use it with your inhaler             YELLOW ZONE Getting Worse  I have ANY of these:    I do not feel good    Cough or wheeze    Chest feels tight    Wake up at night   1. Keep taking your Green Zone medications  2. Start taking your rescue medicine:    every 20 minutes for up to 1 hour. Then every 4 hours for 24-48 hours.  3. If you stay in the Yellow Zone for more than 12-24 hours, contact your doctor.  4. If you do not return to the Green Zone in 12-24 hours or you get worse, start taking your oral steroid medicine if prescribed by your provider.           RED ZONE Medical Alert - Get Help  I have ANY of these:    I feel awful    Medicine is not helping    Breathing getting harder    Trouble walking or talking    Nose opens wide to breathe       1. Take your rescue medicine NOW  2. If your provider has prescribed an oral steroid medicine, start taking it NOW  3. Call your doctor NOW  4. If you are still in the Red Zone after 20 minutes and you have not reached your doctor:    Take your rescue medicine again and    Call 911 or go to the emergency room right away    See your regular doctor within 2 weeks of an Emergency Room or Urgent Care visit for follow-up treatment.          Annual  Reminders:  Meet with Asthma Educator,  Flu Shot in the Fall, consider Pneumonia Vaccination for patients with asthma (aged 19 and older).    Pharmacy:    [id number] [address] [facility] - [place], [place] - [address]  WRITTEN PRESCRIPTION REQUESTED                      Asthma Triggers  How To Control Things That Make Your Asthma Worse    Triggers are things that make your asthma worse.  Look at the list below to help you find your triggers and what you can do about them.  You can help prevent asthma flare-ups by staying away from your triggers.      Trigger                                                          What you can do   Cigarette Smoke  Tobacco smoke can make asthma worse. Do not allow smoking in your home, car or around you.  Be sure no one smokes at a child s day care or school.  If you smoke, ask your health care provider for ways to help you quit.  Ask family members to quit too.  Ask your health care provider for a referral to Quit Plan to help you quit smoking, or call 3-571-748-PLAN.     Colds, Flu, Bronchitis  These are common triggers of asthma. Wash your hands often.  Don t touch your eyes, nose or mouth.  Get a flu shot every year.     Dust Mites  These are tiny bugs that live in cloth or carpet. They are too small to see. Wash sheets and blankets in hot water every week.   Encase pillows and mattress in dust mite proof covers.  Avoid having carpet if you can. If you have carpet, vacuum weekly.   Use a dust mask and HEPA vacuum.   Pollen and Outdoor Mold  Some people are allergic to trees, grass, or weed pollen, or molds. Try to keep your windows closed.  Limit time out doors when pollen count is high.   Ask you health care provider about taking medicine during allergy season.     Animal Dander  Some people are allergic to skin flakes, urine or saliva from pets with fur or feathers. Keep pets with fur or feathers out of  your home.    If you can t keep the pet outdoors, then keep the pet out of your bedroom.  Keep the bedroom door closed.  Keep pets off cloth furniture and away from stuffed toys.     Mice, Rats, and Cockroaches  Some people are allergic to the waste from these pests.   Cover food and garbage.  Clean up spills and food crumbs.  Store grease in the refrigerator.   Keep food out of the bedroom.   Indoor Mold  This can be a trigger if your home has high moisture. Fix leaking faucets, pipes, or other sources of water.   Clean moldy surfaces.  Dehumidify basement if it is damp and smelly.   Smoke, Strong Odors, and Sprays  These can reduce air quality. Stay away from strong odors and sprays, such as perfume, powder, hair spray, paints, smoke incense, paint, cleaning products, candles and new carpet.   Exercise or Sports  Some people with asthma have this trigger. Be active!  Ask your doctor about taking medicine before sports or exercise to prevent symptoms.    Warm up for 5-10 minutes before and after sports or exercise.     Other Triggers of Asthma  Cold air:  Cover your nose and mouth with a scarf.  Sometimes laughing or crying can be a trigger.  Some medicines and food can trigger asthma.

## 2019-08-19 ENCOUNTER — TELEPHONE (OUTPATIENT)
Dept: FAMILY MEDICINE | Facility: CLINIC | Age: 84
End: 2019-08-19

## 2019-08-19 NOTE — TELEPHONE ENCOUNTER
Dr. Churchill or DOD,    Are you agreeable to 8 day Plavix hold for spinal injection?   Pt reports she has done an 8 day Plavix hold for inj in past.  Yuridia Guthrie RN

## 2019-08-19 NOTE — TELEPHONE ENCOUNTER
My review of the chart indicates that she is on Plavix because she is allergic to aspirin.  I do not see any suggestion that she is within 12 months of a coronary artery stent placement.  However, the Plavix should not be stopped if the patient is within 12 months of a coronary artery stent being placed.  If the patient is only on Plavix because she is allergic to aspirin, then it would be reasonable for her to hold her Plavix for 8 days in anticipation of her joint injection.

## 2019-08-19 NOTE — TELEPHONE ENCOUNTER
Reason for Call:  Other    Detailed comments: pt was told to stop taking med 8 days before spinal injection/  Please call to advise    Phone Number Patient can be reached at: Home number on file [phone] (home)    Best Time:     Can we leave a detailed message on this number? YES    Call taken on 8/19/2019 at 2:38 PM by Mekhi Girard

## 2019-08-20 NOTE — TELEPHONE ENCOUNTER
Called pt- she has not had coronary artery stent placement within 12 months  Informed her it was ok to hold Plavix for 8 days prior to joint injection    Carrillo AMEZCUA RN

## 2019-08-26 DIAGNOSIS — I10 ESSENTIAL HYPERTENSION: Primary | ICD-10-CM

## 2019-08-26 NOTE — TELEPHONE ENCOUNTER
"Requested Prescriptions   Pending Prescriptions Disp Refills     metoprolol succinate ER (TOPROL-XL) 100 MG 24 hr tablet [Pharmacy Med Name: METOPROLOL SUCCINATE  MG Tablet Extended Release 24 Hour] 90 tablet 3     Sig: TAKE 1 TABLET EVERY DAY  (DUE  FOR  OFFICE  VISIT, PLEASE MAKE APPOINTMENT.)  Last Written Prescription Date:  8/26/19  Last Fill Quantity: 90 tab,  # refills: 3   Last office visit: 8/15/2019 with prescribing provider:  Kerline   Future Office Visit:   Next 5 appointments (look out 90 days)    Sep 20, 2019 12:30 PM CDT  [facility] [address] [address] [facility] [address]  [phone]             Beta-Blockers Protocol Passed - 8/26/2019  3:16 PM        Passed - Blood pressure under 140/90 in past 12 months     BP Readings from Last 3 Encounters:   08/15/19 138/78   06/19/19 130/80   04/19/19 139/79                 Passed - Patient is age 6 or older        Passed - Recent (12 mo) or future (30 days) visit within the authorizing provider's specialty     Patient had office visit in the last 12 months or has a visit in the next 30 days with authorizing provider or within the authorizing provider's specialty.  See \"Patient Info\" tab in inbasket, or \"Choose Columns\" in Meds & Orders section of the refill encounter.              Passed - Medication is active on med list           "

## 2019-08-27 RX ORDER — METOPROLOL SUCCINATE 100 MG/1
100 TABLET, EXTENDED RELEASE ORAL DAILY
Qty: 90 TABLET | Refills: 3 | Status: SHIPPED | OUTPATIENT
Start: 2019-08-27 | End: 2020-08-24

## 2019-09-20 ENCOUNTER — OFFICE VISIT (OUTPATIENT)
Dept: FAMILY MEDICINE | Facility: CLINIC | Age: 84
End: 2019-09-20
Payer: COMMERCIAL

## 2019-09-20 VITALS
TEMPERATURE: 97.5 F | SYSTOLIC BLOOD PRESSURE: 139 MMHG | OXYGEN SATURATION: 97 % | HEART RATE: 93 BPM | WEIGHT: 279.5 LBS | DIASTOLIC BLOOD PRESSURE: 84 MMHG | HEIGHT: 67 IN | BODY MASS INDEX: 43.87 KG/M2

## 2019-09-20 DIAGNOSIS — N18.30 CKD (CHRONIC KIDNEY DISEASE) STAGE 3, GFR 30-59 ML/MIN (H): ICD-10-CM

## 2019-09-20 DIAGNOSIS — E11.21 TYPE 2 DIABETES MELLITUS WITH DIABETIC NEPHROPATHY, WITHOUT LONG-TERM CURRENT USE OF INSULIN (H): Primary | ICD-10-CM

## 2019-09-20 DIAGNOSIS — M85.80 OSTEOPENIA WITH HIGH RISK OF FRACTURE: ICD-10-CM

## 2019-09-20 DIAGNOSIS — I10 ESSENTIAL HYPERTENSION: ICD-10-CM

## 2019-09-20 DIAGNOSIS — I25.10 CORONARY ARTERY DISEASE INVOLVING NATIVE CORONARY ARTERY OF NATIVE HEART WITHOUT ANGINA PECTORIS: ICD-10-CM

## 2019-09-20 DIAGNOSIS — G47.33 OSA ON CPAP: ICD-10-CM

## 2019-09-20 DIAGNOSIS — Z23 NEED FOR PROPHYLACTIC VACCINATION AND INOCULATION AGAINST INFLUENZA: ICD-10-CM

## 2019-09-20 DIAGNOSIS — F33.1 MODERATE EPISODE OF RECURRENT MAJOR DEPRESSIVE DISORDER (H): ICD-10-CM

## 2019-09-20 DIAGNOSIS — K43.9 VENTRAL HERNIA WITHOUT OBSTRUCTION OR GANGRENE: ICD-10-CM

## 2019-09-20 LAB — HBA1C MFR BLD: 6.1 % (ref 0–5.6)

## 2019-09-20 PROCEDURE — 99214 OFFICE O/P EST MOD 30 MIN: CPT | Mod: 25 | Performed by: INTERNAL MEDICINE

## 2019-09-20 PROCEDURE — 90662 IIV NO PRSV INCREASED AG IM: CPT | Performed by: INTERNAL MEDICINE

## 2019-09-20 PROCEDURE — 82043 UR ALBUMIN QUANTITATIVE: CPT | Performed by: INTERNAL MEDICINE

## 2019-09-20 PROCEDURE — 80053 COMPREHEN METABOLIC PANEL: CPT | Performed by: INTERNAL MEDICINE

## 2019-09-20 PROCEDURE — 83036 HEMOGLOBIN GLYCOSYLATED A1C: CPT | Performed by: INTERNAL MEDICINE

## 2019-09-20 PROCEDURE — 36415 COLL VENOUS BLD VENIPUNCTURE: CPT | Performed by: INTERNAL MEDICINE

## 2019-09-20 PROCEDURE — G0008 ADMIN INFLUENZA VIRUS VAC: HCPCS | Performed by: INTERNAL MEDICINE

## 2019-09-20 RX ORDER — GLIMEPIRIDE 2 MG/1
2 TABLET ORAL
Qty: 90 TABLET | Refills: 3 | Status: SHIPPED | OUTPATIENT
Start: 2019-09-20 | End: 2020-08-24

## 2019-09-20 RX ORDER — CLOPIDOGREL BISULFATE 75 MG/1
75 TABLET ORAL DAILY
Qty: 90 TABLET | Refills: 3 | Status: SHIPPED | OUTPATIENT
Start: 2019-09-20 | End: 2020-08-24

## 2019-09-20 RX ORDER — BUPROPION HYDROCHLORIDE 100 MG/1
100 TABLET, EXTENDED RELEASE ORAL 2 TIMES DAILY
Qty: 180 TABLET | Refills: 3 | Status: SHIPPED | OUTPATIENT
Start: 2019-09-20 | End: 2020-08-24

## 2019-09-20 RX ORDER — IBANDRONATE SODIUM 150 MG/1
150 TABLET, FILM COATED ORAL
Qty: 3 TABLET | Refills: 3 | Status: SHIPPED | OUTPATIENT
Start: 2019-09-20 | End: 2020-01-20

## 2019-09-20 ASSESSMENT — ANXIETY QUESTIONNAIRES
2. NOT BEING ABLE TO STOP OR CONTROL WORRYING: NOT AT ALL
6. BECOMING EASILY ANNOYED OR IRRITABLE: NOT AT ALL
3. WORRYING TOO MUCH ABOUT DIFFERENT THINGS: NOT AT ALL
GAD7 TOTAL SCORE: 0
7. FEELING AFRAID AS IF SOMETHING AWFUL MIGHT HAPPEN: NOT AT ALL
5. BEING SO RESTLESS THAT IT IS HARD TO SIT STILL: NOT AT ALL
1. FEELING NERVOUS, ANXIOUS, OR ON EDGE: NOT AT ALL
IF YOU CHECKED OFF ANY PROBLEMS ON THIS QUESTIONNAIRE, HOW DIFFICULT HAVE THESE PROBLEMS MADE IT FOR YOU TO DO YOUR WORK, TAKE CARE OF THINGS AT HOME, OR GET ALONG WITH OTHER PEOPLE: NOT DIFFICULT AT ALL

## 2019-09-20 ASSESSMENT — PATIENT HEALTH QUESTIONNAIRE - PHQ9
SUM OF ALL RESPONSES TO PHQ QUESTIONS 1-9: 6
5. POOR APPETITE OR OVEREATING: NOT AT ALL

## 2019-09-20 ASSESSMENT — MIFFLIN-ST. JEOR: SCORE: 1745.43

## 2019-09-20 NOTE — PROGRESS NOTES
Subjective     Moira Andre is a 85 year old female who presents to clinic today for the following health issues:    Patient uses seated walker    HPI   Diabetes Follow-up      How often are you checking your blood sugar? One time daily    What time of day are you checking your blood sugars (select all that apply)? Mornings    Have you had any blood sugars above 200?  No    Have you had any blood sugars below 70?  No    What symptoms do you notice when your blood sugar is low?  None    What concerns do you have today about your diabetes? None     Do you have any of these symptoms? (Select all that apply)  Blurry vision? - not sure if glasses or vision problem?     Have you had a diabetic eye exam in the last 12 months? Yes- Date of last eye exam: 5 months ago    Patient is here for diabetes follow-up  She feels good  Sugars were 111 today  It was 150 the other day  Notes she had an apple prior to bed  She recently got new glasses  Notes she sometimes has blurry vision  Today she cannot see well  Plans to get glasses checked    BP Readings from Last 2 Encounters:   09/20/19 139/84   08/15/19 138/78     Hemoglobin A1C (%)   Date Value   09/20/2019 6.1 (H)   06/19/2019 6.1 (H)     LDL Cholesterol Calculated (mg/dL)   Date Value   08/06/2018 46   04/03/2018 74       Diabetes Management Resources      How many servings of fruits and vegetables do you eat daily?  0-1    On average, how many sweetened beverages do you drink each day (soda, juice, sweet tea, etc)?   2-3    How many days per week do you miss taking your medication? 0-1    Hypertension  She has not been checking BP  States she knows she should  Sometimes she puts off doing things she should do  BP elevated today (151/79)    Asthma  Taking Wixela twice daily  This replaced Advair  Was given to her ~1 month ago  This is working well for her    Back pain  Following with orthopedics at [facility]  Had back injection done at [facility] recently  Had significant pain relief  "after  Was able to walk  Notes pain started to flare last week  Had trouble walking a couple days ago  Using icy hot for pain  Tried other OTC pain medications as well    Depression  Doing well  States at night she does get depressed  Notes she might be feeling lonely  Endorses she has a good friend [place]  Does cross word puzzles at home  She enjoys this    Sleep apnea  Using CPAP every night  States she uses it for ~6 hours every night    Hernia  Patient has hernia  She is nervous about getting this surgically fixed  States when she was in hospital physician told her surgery could be fatal  Reports this is not bothersome, it's just there    Medications and Labs reviewed in EPIC    Reviewed and updated as needed this visit by Provider         Review of Systems   ROS COMP: Constitutional, HEENT, cardiovascular, pulmonary, GI, , musculoskeletal, neuro, skin, endocrine and psych systems are negative, except as otherwise noted.    This document serves as a record of the services and decisions personally performed and made by Maryan Churchill MD. It was created on her behalf by Cher Lozada, a trained medical scribe. The creation of this document is based on the provider's statements to the medical scribe.  Cher Lozada 12:50 PM September 20, 2019        Objective    /84   Pulse 93   Temp 97.5  F (36.4  C) (Oral)   Ht 1.702 m (5' 7\")   Wt 126.8 kg (279 lb 8 oz)   SpO2 97%   BMI 43.78 kg/m    Body mass index is 43.78 kg/m .  Physical Exam   GENERAL: obese, alert and no distress  PSYCH: mentation appears normal, affect normal/bright    Diagnostic Test Results:  Results for orders placed or performed in visit on 09/20/19 (from the past 24 hour(s))   Hemoglobin A1c   Result Value Ref Range    Hemoglobin A1C 6.1 (H) 0 - 5.6 %           Assessment & Plan     Moira was seen today for diabetes.    Diagnoses and all orders for this visit:    Type 2 diabetes mellitus with diabetic nephropathy, without long-term current use of " insulin (H)  Doing well  Compliant with medication  Sugars seem to be under better control  Per patient BS was 111 today  Recently had diabetic eye exam  Got new glasses  Notes she has been having blurry vision  Does not know if due to glasses or she has vision issue  Plans to see ophthalmologist again regarding this  Labs today  Will continue with current medications  -     glimepiride (AMARYL) 2 MG tablet; Take 1 tablet (2 mg) by mouth every morning (before breakfast)  -     Albumin Random Urine Quantitative with Creat Ratio  -     Hemoglobin A1c  -     Comprehensive metabolic panel    Ventral hernia without obstruction or gangrene  Patient has ventral hernia with no obstruction  Declined for surgery in past  She's concerned about post operation complications  Relates in hospital she was told surgery would be complicated  She is nervous about dying from this procedure  States this is not bothersome for her  The hernia is just there  Explained surgery would have been complicated at the time since she was very sick  Explained she has large hernia and this should be checked  Explained this should not be left alone  Explained this hernia can get complication of obstructin  Advised to see general surgery to discuss treatment  Referral placed  She will schedule  -     GENERAL SURG ADULT REFERRAL    Essential hypertension  Stable  Compliant with medication  She has not been checking BP at home  BP elevated today (151/79)  BP rechecked and was 139/84  Monitor your blood pressure once a week at home.  Bring those readings on your next visit.  Notify us if your blood pressure readings consistently stays greater than 140/90.    ELIGIO on CPAP  Doing well  Using CPAP nightly    Need for prophylactic vaccination and inoculation against influenza  Flu shot administered in office today  -     INFLUENZA (HIGH DOSE) 3 VALENT VACCINE [22411]  -     ADMIN INFLUENZA (For MEDICARE Patients ONLY) []    CKD (chronic kidney disease)  "stage 3, GFR 30-59 ml/min (H)  Doing well  Answered all questions patient had  Will continue to monitor  Avoid nonsteroidal anti-inflammation pain medication such as ibuprofen, motrin, or aleve  Tylenol is safe to use  Stay well hydrated    Osteopenia with high risk of fracture  Last DX was done in 2015  On Boniva  Taking vitamin D supplements daily  -     IBANdronate (BONIVA) 150 MG tablet; Take 1 tablet (150 mg) by mouth every 30 days    Moderate episode of recurrent major depressive disorder (H)  Stable  Compliant with medication  -     buPROPion (WELLBUTRIN SR) 100 MG 12 hr tablet; Take 1 tablet (100 mg) by mouth 2 times daily    Coronary artery disease involving native coronary artery of native heart without angina pectoris  Doing well  Compliant with medication  -     clopidogrel (PLAVIX) 75 MG tablet; Take 1 tablet (75 mg) by mouth daily     BMI:   Estimated body mass index is 43.78 kg/m  as calculated from the following:    Height as of this encounter: 1.702 m (5' 7\").    Weight as of this encounter: 126.8 kg (279 lb 8 oz).   Weight management plan: Discussed healthy diet and exercise guidelines    Patient Instructions   Labs today  I refilled your prescriptions  Schedule an appointment for surgical consultants    You have chronic kidney disease  Avoid nonsteroidal anti-inflammation pain medication such as ibuprofen, motrin, or aleve  Tylenol is safe to use    Monitor your blood pressure once a week at home.  Bring those readings on your next visit.  Notify us if your blood pressure readings consistently stays greater than 140/90.    Follow-up in 4 months  Seek sooner medical attention if there is any worsening of symptoms or problems    The information in this document, created by the medical scribe for me, accurately reflects the services I personally performed and the decisions made by me. I have reviewed and approved this document for accuracy prior to leaving the patient care area.  September 20, 2019 " 1:08 PM    Maryan Churchill MD  [facility]

## 2019-09-20 NOTE — LETTER
[place] [address] [facility]  [address]  [place]  [address]  Tel: [phone]    September 24, 2019    Moira RAJI Andre  [address]  [facility] [address]        Mert Pizarro,     This is to inform you regarding your test result.     Your chronic kidney disease is stable.   Liver enzymes are normal.   Urine for protein is positive but numbers are stable.   HbA1c which is average glucose during last 3 months is 6.1%.   Your diabetes is under cotrol.         Sincerely,       Dr.Nasima Kerline MD,FACP / simin    Resulted Orders   Albumin Random Urine Quantitative with Creat Ratio   Result Value Ref Range    Creatinine Urine 189 mg/dL    Albumin Urine mg/L 99 mg/L    Albumin Urine mg/g Cr 52.38 (H) 0 - 25 mg/g Cr   Hemoglobin A1c   Result Value Ref Range    Hemoglobin A1C 6.1 (H) 0 - 5.6 %      Comment:      Normal <5.7% Prediabetes 5.7-6.4%  Diabetes 6.5% or higher - adopted from ADA   consensus guidelines.     Comprehensive metabolic panel   Result Value Ref Range    Sodium 137 133 - 144 mmol/L    Potassium 4.4 3.4 - 5.3 mmol/L    Chloride 108 94 - 109 mmol/L    Carbon Dioxide 22 20 - 32 mmol/L    Anion Gap 7 3 - 14 mmol/L    Glucose 61 (L) 70 - 99 mg/dL    Urea Nitrogen 35 (H) 7 - 30 mg/dL    Creatinine 1.45 (H) 0.52 - 1.04 mg/dL    GFR Estimate 33 (L) >60 mL/min/[1.73_m2]      Comment:      Non  GFR Calc  Starting 12/18/2018, serum creatinine based estimated GFR (eGFR) will be   calculated using the Chronic Kidney Disease Epidemiology Collaboration   (CKD-EPI) equation.      GFR Estimate If Black 38 (L) >60 mL/min/[1.73_m2]      Comment:       GFR Calc  Starting 12/18/2018, serum creatinine based estimated GFR (eGFR) will be   calculated using the Chronic Kidney Disease Epidemiology Collaboration   (CKD-EPI) equation.      Calcium 8.9 8.5 - 10.1 mg/dL    Bilirubin Total 0.2 0.2 - 1.3 mg/dL    Albumin 3.8 3.4 - 5.0 g/dL    Protein Total 7.2 6.8 - 8.8 g/dL     Alkaline Phosphatase 67 40 - 150 U/L    ALT 16 0 - 50 U/L    AST 9 0 - 45 U/L

## 2019-09-20 NOTE — PATIENT INSTRUCTIONS
Labs today  I refilled your prescriptions  Schedule an appointment for surgical consultants    You have chronic kidney disease  Avoid nonsteroidal anti-inflammation pain medication such as ibuprofen, motrin, or aleve  Tylenol is safe to use    Monitor your blood pressure once a week at home.  Bring those readings on your next visit.  Notify us if your blood pressure readings consistently stays greater than 140/90.    Follow-up in 4 months  Seek sooner medical attention if there is any worsening of symptoms or problems

## 2019-09-21 LAB
ALBUMIN SERPL-MCNC: 3.8 G/DL (ref 3.4–5)
ALP SERPL-CCNC: 67 U/L (ref 40–150)
ALT SERPL W P-5'-P-CCNC: 16 U/L (ref 0–50)
ANION GAP SERPL CALCULATED.3IONS-SCNC: 7 MMOL/L (ref 3–14)
AST SERPL W P-5'-P-CCNC: 9 U/L (ref 0–45)
BILIRUB SERPL-MCNC: 0.2 MG/DL (ref 0.2–1.3)
BUN SERPL-MCNC: 35 MG/DL (ref 7–30)
CALCIUM SERPL-MCNC: 8.9 MG/DL (ref 8.5–10.1)
CHLORIDE SERPL-SCNC: 108 MMOL/L (ref 94–109)
CO2 SERPL-SCNC: 22 MMOL/L (ref 20–32)
CREAT SERPL-MCNC: 1.45 MG/DL (ref 0.52–1.04)
CREAT UR-MCNC: 189 MG/DL
GFR SERPL CREATININE-BSD FRML MDRD: 33 ML/MIN/{1.73_M2}
GLUCOSE SERPL-MCNC: 61 MG/DL (ref 70–99)
MICROALBUMIN UR-MCNC: 99 MG/L
MICROALBUMIN/CREAT UR: 52.38 MG/G CR (ref 0–25)
POTASSIUM SERPL-SCNC: 4.4 MMOL/L (ref 3.4–5.3)
PROT SERPL-MCNC: 7.2 G/DL (ref 6.8–8.8)
SODIUM SERPL-SCNC: 137 MMOL/L (ref 133–144)

## 2019-09-21 ASSESSMENT — ANXIETY QUESTIONNAIRES: GAD7 TOTAL SCORE: 0

## 2019-09-21 ASSESSMENT — ASTHMA QUESTIONNAIRES: ACT_TOTALSCORE: 23

## 2019-09-24 NOTE — RESULT ENCOUNTER NOTE
Please notify patient by sending following letter with copy of test results      Mert Pizarro,    This is to inform you regarding your test result.    Your chronic kidney disease is stable.  Liver enzymes are normal.   Urine for protein is positive but numbers are stable.  HbA1c which is average glucose during last 3 months is 6.1%.  Your diabetes is under cotrol.        Sincerely,      Dr.Nasima Kerline MD,FACP

## 2019-10-02 ENCOUNTER — TELEPHONE (OUTPATIENT)
Dept: FAMILY MEDICINE | Facility: CLINIC | Age: 84
End: 2019-10-02

## 2019-10-02 NOTE — TELEPHONE ENCOUNTER
Reason for Call:  Other prescription    Detailed comments: After-visit summary says to change how she takes her IBANdronate (BONIVA) 150 MG tablet but she doesn't recall it was ever discussed. Please rech out to Pt and let her know the new way to take her Rx - start date for Rx is tomorrow 10.03.19    Phone Number Patient can be reached at: Cell number on file:    Telephone Information:   Mobile [phone]     Best Time: any    Can we leave a detailed message on this number? YES    Call taken on 10/2/2019 at 2:04 PM by Madisyn Ramírez

## 2019-10-02 NOTE — TELEPHONE ENCOUNTER
Returned call to patient.  Patient has been taking medication on the first of the month but forgot to take it yesterday so she will take it tomorrow.  Reviewed directions of taking medications with patient.  No further questions.    WANDY RamirezN, RN  [employer] [facility]

## 2019-10-16 DIAGNOSIS — E11.21 TYPE 2 DIABETES MELLITUS WITH DIABETIC NEPHROPATHY, WITHOUT LONG-TERM CURRENT USE OF INSULIN (H): ICD-10-CM

## 2019-10-16 NOTE — TELEPHONE ENCOUNTER
"True Metrix blood glucose test strips    Last Written Prescription Date:  11/16/18  Last Fill Quantity: 100 strips,  # refills: 1   Last office visit: 9/20/2019 with prescribing provider:  Kerline   Future Office Visit:      Requested Prescriptions   Pending Prescriptions Disp Refills     TRUE METRIX BLOOD GLUCOSE TEST test strip [Pharmacy Med Name: TRUE METRIX SELF MONITORING BLOOD GLUCOSE STRIPS   Strip] 100 strip 1     Sig: TEST EVERY DAY       Diabetic Supplies Protocol Passed - 10/16/2019 12:05 AM        Passed - Medication is active on med list        Passed - Patient is 18 years of age or older        Passed - Recent (6 mo) or future (30 days) visit within the authorizing provider's specialty     Patient had office visit in the last 6 months or has a visit in the next 30 days with authorizing provider.  See \"Patient Info\" tab in inbasket, or \"Choose Columns\" in Meds & Orders section of the refill encounter.              "

## 2020-01-20 ENCOUNTER — OFFICE VISIT (OUTPATIENT)
Dept: FAMILY MEDICINE | Facility: CLINIC | Age: 85
End: 2020-01-20
Payer: COMMERCIAL

## 2020-01-20 VITALS
SYSTOLIC BLOOD PRESSURE: 139 MMHG | WEIGHT: 277 LBS | OXYGEN SATURATION: 95 % | TEMPERATURE: 97.7 F | DIASTOLIC BLOOD PRESSURE: 89 MMHG | BODY MASS INDEX: 43.47 KG/M2 | HEIGHT: 67 IN | HEART RATE: 90 BPM

## 2020-01-20 DIAGNOSIS — M06.041 RHEUMATOID ARTHRITIS INVOLVING BOTH HANDS WITH NEGATIVE RHEUMATOID FACTOR (H): ICD-10-CM

## 2020-01-20 DIAGNOSIS — T14.8XXA HEMATOMA OF SKIN: ICD-10-CM

## 2020-01-20 DIAGNOSIS — M85.80 OSTEOPENIA WITH HIGH RISK OF FRACTURE: ICD-10-CM

## 2020-01-20 DIAGNOSIS — N18.30 CKD (CHRONIC KIDNEY DISEASE) STAGE 3, GFR 30-59 ML/MIN (H): ICD-10-CM

## 2020-01-20 DIAGNOSIS — F32.1 MODERATE MAJOR DEPRESSION (H): ICD-10-CM

## 2020-01-20 DIAGNOSIS — K43.9 HERNIA OF ABDOMINAL WALL: ICD-10-CM

## 2020-01-20 DIAGNOSIS — M06.042 RHEUMATOID ARTHRITIS INVOLVING BOTH HANDS WITH NEGATIVE RHEUMATOID FACTOR (H): ICD-10-CM

## 2020-01-20 DIAGNOSIS — J45.20 INTERMITTENT ASTHMA, UNCOMPLICATED: ICD-10-CM

## 2020-01-20 DIAGNOSIS — E66.01 MORBID OBESITY (H): ICD-10-CM

## 2020-01-20 DIAGNOSIS — E11.21 TYPE 2 DIABETES MELLITUS WITH DIABETIC NEPHROPATHY, WITHOUT LONG-TERM CURRENT USE OF INSULIN (H): Primary | ICD-10-CM

## 2020-01-20 DIAGNOSIS — R06.02 SOB (SHORTNESS OF BREATH): ICD-10-CM

## 2020-01-20 LAB
ALBUMIN SERPL-MCNC: 3.6 G/DL (ref 3.4–5)
ANION GAP SERPL CALCULATED.3IONS-SCNC: 8 MMOL/L (ref 3–14)
BUN SERPL-MCNC: 26 MG/DL (ref 7–30)
CALCIUM SERPL-MCNC: 9.3 MG/DL (ref 8.5–10.1)
CHLORIDE SERPL-SCNC: 108 MMOL/L (ref 94–109)
CHOLEST SERPL-MCNC: 167 MG/DL
CO2 SERPL-SCNC: 21 MMOL/L (ref 20–32)
CREAT SERPL-MCNC: 1.37 MG/DL (ref 0.52–1.04)
GFR SERPL CREATININE-BSD FRML MDRD: 35 ML/MIN/{1.73_M2}
GLUCOSE SERPL-MCNC: 135 MG/DL (ref 70–99)
HBA1C MFR BLD: 6.5 % (ref 0–5.6)
HDLC SERPL-MCNC: 50 MG/DL
LDLC SERPL CALC-MCNC: 52 MG/DL
NONHDLC SERPL-MCNC: 117 MG/DL
PHOSPHATE SERPL-MCNC: 3.8 MG/DL (ref 2.5–4.5)
POTASSIUM SERPL-SCNC: 4.6 MMOL/L (ref 3.4–5.3)
SODIUM SERPL-SCNC: 137 MMOL/L (ref 133–144)
TRIGL SERPL-MCNC: 327 MG/DL

## 2020-01-20 PROCEDURE — 80069 RENAL FUNCTION PANEL: CPT | Performed by: INTERNAL MEDICINE

## 2020-01-20 PROCEDURE — 80061 LIPID PANEL: CPT | Performed by: INTERNAL MEDICINE

## 2020-01-20 PROCEDURE — 99214 OFFICE O/P EST MOD 30 MIN: CPT | Performed by: INTERNAL MEDICINE

## 2020-01-20 PROCEDURE — 36415 COLL VENOUS BLD VENIPUNCTURE: CPT | Performed by: INTERNAL MEDICINE

## 2020-01-20 PROCEDURE — 83036 HEMOGLOBIN GLYCOSYLATED A1C: CPT | Performed by: INTERNAL MEDICINE

## 2020-01-20 RX ORDER — ALBUTEROL SULFATE 90 UG/1
2 AEROSOL, METERED RESPIRATORY (INHALATION) EVERY 6 HOURS
Qty: 1 INHALER | Refills: 3 | Status: SHIPPED | OUTPATIENT
Start: 2020-01-20 | End: 2021-08-27

## 2020-01-20 RX ORDER — DULOXETIN HYDROCHLORIDE 60 MG/1
60 CAPSULE, DELAYED RELEASE ORAL DAILY
Qty: 90 CAPSULE | Refills: 1 | Status: SHIPPED | OUTPATIENT
Start: 2020-01-20 | End: 2020-08-24

## 2020-01-20 RX ORDER — IBANDRONATE SODIUM 150 MG/1
150 TABLET, FILM COATED ORAL
Qty: 3 TABLET | Refills: 3 | Status: SHIPPED | OUTPATIENT
Start: 2020-01-20 | End: 2021-02-16

## 2020-01-20 ASSESSMENT — MIFFLIN-ST. JEOR: SCORE: 1729.09

## 2020-01-20 NOTE — PATIENT INSTRUCTIONS
Labs today  Make appointment with surgery for fixing abdominal hernia  Monitor your blood pressure once a week  at home.  Bring those readings on your next visit.  Notify us if your blood pressure readings consistently stays greater than 140/90.  Follow up in 4 months  Seek sooner medical attention if there is any worsening of symptoms or problems.

## 2020-01-20 NOTE — PROGRESS NOTES
Subjective     Moira Andre is a 86 year old female who presents to clinic today for the following health issues:    HPI   Medication Followup     Taking Medication as prescribed: yes    Side Effects:  None       She is here for a medication follow up    Lightheadedness  Feeling Lightheaded and clammy lately  C/o SOB when she experienced lightheadedness episode last week  She takes 2 tylenol after lightheadedness episodes  Requests a refill of both her rescue and maintenance inhalers    Diabetes  Doing well with her diabetes per patient    Rheumatoid Arthritis  Doing well  Still having minor shakes  Uses patches    Shin Hematoma  Hit her shin against the oven while walking  She now has a hematoma there    Back Pain  She c/o back pain  She has been using a massager to alleviate the pain  She has had this for several weeks now  She has been using a cream for pain relief    Hernia  She is afraid to have surgery   Fears not getting off the operating table    Hoarse Voice  C/o having a hoarse voice  Does not know what is causing it  Says that she does not speak much at home    She got her hearing aids since the last time she was here  BP elevated today (155/81)  BP rechecked in office and was 139/89  C/o of her BP machine not working    Medications and Labs reviewed in EPIC    Reviewed and updated as needed this visit by Provider         Review of Systems   ROS COMP: Constitutional, HEENT, cardiovascular, pulmonary, GI, , musculoskeletal, neuro, skin, endocrine and psych systems are negative, except as otherwise noted.    POSITIVE for back pain  POSITIVE for lightheadedness  POSITIVE for hematoma on shin    This document serves as a record of the services and decisions personally performed and made by Maryan Churchill MD. It was created on her behalf by Paige Grey, a trained medical scribe. The creation of this document is based on the provider's statements to the medical scribe.  Paige Grey 10:44 AM January 20,  "2020        Objective    /89   Pulse 90   Temp 97.7  F (36.5  C) (Oral)   Ht 1.702 m (5' 7\")   Wt 125.6 kg (277 lb)   SpO2 95%   BMI 43.38 kg/m    Body mass index is 43.38 kg/m .  Physical Exam   EXAM:  Constitutional: alert, no distress and morbidly obese  Psychiatric: mentation appears normal and affect normal/bright  Very prominent ventral hernia  Posterior pharyngeal wall is very minimally erythematous no exudate    Diagnostic Test Results:  Labs reviewed in Epic  Results for orders placed or performed in visit on 01/20/20 (from the past 24 hour(s))   Hemoglobin A1c   Result Value Ref Range    Hemoglobin A1C 6.5 (H) 0 - 5.6 %   Lipid panel reflex to direct LDL Non-fasting   Result Value Ref Range    Cholesterol 167 <200 mg/dL    Triglycerides 327 (H) <150 mg/dL    HDL Cholesterol 50 >49 mg/dL    LDL Cholesterol Calculated 52 <100 mg/dL    Non HDL Cholesterol 117 <130 mg/dL   Renal panel (Alb, BUN, Ca, Cl, CO2, Creat, Gluc, Phos, K, Na)   Result Value Ref Range    Sodium 137 133 - 144 mmol/L    Potassium 4.6 3.4 - 5.3 mmol/L    Chloride 108 94 - 109 mmol/L    Carbon Dioxide 21 20 - 32 mmol/L    Anion Gap 8 3 - 14 mmol/L    Glucose 135 (H) 70 - 99 mg/dL    Urea Nitrogen 26 7 - 30 mg/dL    Creatinine 1.37 (H) 0.52 - 1.04 mg/dL    GFR Estimate 35 (L) >60 mL/min/[1.73_m2]    GFR Estimate If Black 40 (L) >60 mL/min/[1.73_m2]    Calcium 9.3 8.5 - 10.1 mg/dL    Phosphorus 3.8 2.5 - 4.5 mg/dL    Albumin 3.6 3.4 - 5.0 g/dL          32  Assessment & Plan     Moira was seen today for recheck medication.    Diagnoses and all orders for this visit:    Type 2 diabetes mellitus with diabetic nephropathy, without long-term current use of insulin (H)  -     Hemoglobin A1c  -     Cancel: Comprehensive metabolic panel  -     Lipid panel reflex to direct LDL Non-fasting  -     Renal panel (Alb, BUN, Ca, Cl, CO2, Creat, Gluc, Phos, K, Na)   Stable  Compliant with medication  Lab Results   Component Value Date    A1C 6.5 " 01/20/2020    A1C 6.1 09/20/2019    A1C 6.1 06/19/2019    A1C 6.4 03/04/2019    A1C 7.0 10/31/2018         Osteopenia with high risk of fracture  -     IBANdronate (BONIVA) 150 MG tablet; Take 1 tablet (150 mg) by mouth every 30 days  Stable  Compliant with medication    Rheumatoid arthritis involving both hands with negative rheumatoid factor (H)  It is questionable diagnosis as she was diagnosed and then another rheumatologist that no  Doing well  Having minor hand shakes  She seems to have it under control    Moderate major depression (H)  -     DULoxetine (CYMBALTA) 60 MG capsule; Take 1 capsule (60 mg) by mouth daily for depression  Stable  Compliant with medication      Morbid obesity (H)  Stable  She is working on losing weight    CKD (chronic kidney disease) stage 3, GFR 30-59 ml/min (H)  -     Renal panel (Alb, BUN, Ca, Cl, CO2, Creat, Gluc, Phos, K, Na)  Stable  Compliant with medication      SOB (shortness of breath) due to history of asthma  -     albuterol (PROAIR HFA/PROVENTIL HFA/VENTOLIN HFA) 108 (90 Base) MCG/ACT inhaler; Inhale 2 puffs into the lungs every 6 hours For shortness of breath  Has been feeling lightheaded and clammy lately  Has had recent episodes of lightheadedness  Experiences SOB during episodes  Prescriptions have been ordered for both inhalers per patient request    Intermittent asthma, uncomplicated  -     fluticasone-salmeterol (WIXELA INHUB) 100-50 MCG/DOSE inhaler; INHALE 1 PUFF EVERY 12 HOURS  Inhaler prescriptions refilled  See Above    Hematoma of skin  Comments:  left shin  She walked into her oven  Hit her left shin  Informed that this is because she is on blood thinners  It is small and will resolve over.  Of time    Hernia of abdominal wall  -     GENERAL SURG ADULT REFERRAL; Future  She fears having the surgery because of possibility of death on the operating table  Advised to have the surgery  She has very prominent ventral hernia      Hoarse Voice  Informed that this  could be from inhaler use  Advised to see ENT if does not improve  Discussed warm saline gargles and continuing antireflux measure        Patient Instructions   Labs today  Make appointment with surgery for fixing abdominal hernia  Monitor your blood pressure once a week  at home.  Bring those readings on your next visit.  Notify us if your blood pressure readings consistently stays greater than 140/90.  Follow up in 4 months  Seek sooner medical attention if there is any worsening of symptoms or problems.        The information in this document, created by the medical scribe for me, accurately reflects the services I personally performed and the decisions made by me. I have reviewed and approved this document for accuracy prior to leaving the patient care area.  January 20, 2020 11:03 AM    Maryan Churchill MD  [facility]

## 2020-01-20 NOTE — LETTER
[place] [address] [facility]  [address]  [place]  [address]  Tel: [phone]    January 21, 2020    Moira ALEGRIA Jes  [address]  [facility] [address]        Dear Ms. Andre,    This is to inform you regarding your test result.    Your total cholesterol is normal.  The triglycerides are high. Lowering the amount of sugar, alcohol and sweets in the diet helps to control this.Exercise and weight loss helps.  HDL which is called good cholesterol is normal.  Your LDL cholesterol is normal.  This is often call bad cholesterol and high levels increase the risk for heart attacks and strokes.  Eat low cholesterol low fat  diet and do regular physical activity.avoid high sugar containing food.  Chronic kidney disease is stable.  HbA1c which is average glucose during last 3 months is 6.5%.  Your diabetes is under control but numbers have gone up compared to before.    Lab Results       Component                Value               Date                       A1C                      6.5                 01/20/2020                 A1C                      6.1                 09/20/2019                 A1C                      6.1                 06/19/2019                 A1C                      6.4                 03/04/2019                 A1C                      7.0                 10/31/2018              Sincerely,      Dr.Nasima Kerline MD,FACP/SML        Enclosure: Lab Results  Results for orders placed or performed in visit on 01/20/20   Hemoglobin A1c     Status: Abnormal   Result Value Ref Range    Hemoglobin A1C 6.5 (H) 0 - 5.6 %   Lipid panel reflex to direct LDL Non-fasting     Status: Abnormal   Result Value Ref Range    Cholesterol 167 <200 mg/dL    Triglycerides 327 (H) <150 mg/dL    HDL Cholesterol 50 >49 mg/dL    LDL Cholesterol Calculated 52 <100 mg/dL    Non HDL Cholesterol 117 <130 mg/dL   Renal panel (Alb, BUN, Ca, Cl, CO2, Creat, Gluc, Phos, K, Na)     Status: Abnormal    Result Value Ref Range    Sodium 137 133 - 144 mmol/L    Potassium 4.6 3.4 - 5.3 mmol/L    Chloride 108 94 - 109 mmol/L    Carbon Dioxide 21 20 - 32 mmol/L    Anion Gap 8 3 - 14 mmol/L    Glucose 135 (H) 70 - 99 mg/dL    Urea Nitrogen 26 7 - 30 mg/dL    Creatinine 1.37 (H) 0.52 - 1.04 mg/dL    GFR Estimate 35 (L) >60 mL/min/[1.73_m2]    GFR Estimate If Black 40 (L) >60 mL/min/[1.73_m2]    Calcium 9.3 8.5 - 10.1 mg/dL    Phosphorus 3.8 2.5 - 4.5 mg/dL    Albumin 3.6 3.4 - 5.0 g/dL

## 2020-01-21 NOTE — RESULT ENCOUNTER NOTE
Please notify patient by sending following letter with copy of test results      Mert Pizarro,    This is to inform you regarding your test result.    Your total cholesterol is normal.  The triglycerides are high. Lowering the amount of sugar, alcohol and sweets in the diet helps to control this.Exercise and weight loss helps.  HDL which is called good cholesterol is normal.  Your LDL cholesterol is normal.  This is often call bad cholesterol and high levels increase the risk for heart attacks and strokes.  Eat low cholesterol low fat  diet and do regular physical activity.avoid high sugar containing food.  Chronic kidney disease is stable.  HbA1c which is average glucose during last 3 months is 6.5%.  Your diabetes is under control but numbers have gone up compared to before.    Lab Results       Component                Value               Date                       A1C                      6.5                 01/20/2020                 A1C                      6.1                 09/20/2019                 A1C                      6.1                 06/19/2019                 A1C                      6.4                 03/04/2019                 A1C                      7.0                 10/31/2018              Sincerely,      Dr.Nasima Kerline MD,FACP

## 2020-01-22 ENCOUNTER — TRANSFERRED RECORDS (OUTPATIENT)
Dept: HEALTH INFORMATION MANAGEMENT | Facility: CLINIC | Age: 85
End: 2020-01-22

## 2020-01-27 ENCOUNTER — OFFICE VISIT (OUTPATIENT)
Dept: SURGERY | Facility: CLINIC | Age: 85
End: 2020-01-27
Attending: INTERNAL MEDICINE
Payer: COMMERCIAL

## 2020-01-27 VITALS
SYSTOLIC BLOOD PRESSURE: 139 MMHG | BODY MASS INDEX: 43.47 KG/M2 | DIASTOLIC BLOOD PRESSURE: 78 MMHG | HEIGHT: 67 IN | HEART RATE: 96 BPM | WEIGHT: 277 LBS | OXYGEN SATURATION: 97 %

## 2020-01-27 DIAGNOSIS — K43.9 HERNIA OF ABDOMINAL WALL: ICD-10-CM

## 2020-01-27 DIAGNOSIS — K43.2 INCISIONAL HERNIA, WITHOUT OBSTRUCTION OR GANGRENE: Primary | ICD-10-CM

## 2020-01-27 PROCEDURE — 99214 OFFICE O/P EST MOD 30 MIN: CPT | Performed by: SURGERY

## 2020-01-27 ASSESSMENT — MIFFLIN-ST. JEOR: SCORE: 1729.09

## 2020-01-27 NOTE — PROGRESS NOTES
Pain Location: umbilical    Pain type: none - occasional with pressure    Bulge: Yes    Bulge reducible: No    Symptoms: None    Time Frame of Hernia: at least 10 year(s) ago      Lary Boyd RN-BSN

## 2020-02-01 NOTE — PROGRESS NOTES
[facility] Consultation    CHIEF COMPLAINT:  Chief Complaint   Patient presents with     Consult     [facility]       HISTORY OF PRESENT ILLNESS:  Moira Andre is a 86 year old female who is seen in consultation at the request of Dr. Churchill for evaluation of incisional hernia exacerbated with right morbid obesity.  In addition to fulfilling from morbid obesity Mrs. Andre has developed a hernia at the site of the periumbilical incision from cholecystectomy.  She has been doing fine for some years but recently has had episodes of incarceration, they have resolved but causes significant pain and she like to have this repaired.    REVIEW OF SYSTEMS:  Constitutional:  Negative for chills, fatigue, fever and weight change.  Neuro: No extremity, nor facial weakness  Psych:  No unexpected changes in mood  Eyes:  Negative for new vision problems.  ENT:  Negative for ENT pain.  Cardiovascular:  Negative for chest pain, palpitations.  Respiratory:  Negative for cough, dyspnea.  Gastrointestinal: Enlarging abdominal hernia, epigastric incarceration and pain.  Musculoskeletal:  Negative for new arthralgias or myalgias.  Integumentary: No skin rash.  No lesions.    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     Myocardial infarction (H) 9/2007, cath 2013 ml    BMS: stent to OM, diag, nl EF, echo /C angia 2013 , f/u cath no lesion >40%     Nephrolithiasis     right side     OA (osteoarthritis)  of knee      Obesity      Rheumatoid arthritis flare (H)     prednisone     Sleep apnea     restarted using cpap      TIA (transient ischaemic attack)      Ventral hernia, unspecified, without mention of obstruction or gangrene        Past Surgical History:   Procedure Laterality Date     APPENDECTOMY       BIOPSY BREAST      x2 -needle & lumpectomy-benign     CHOLECYSTECTOMY       CORONARY ANGIOGRAPHY ADULT ORDER  2007    Bare metal stent to OM1, Diagonal patent      CORONARY ANGIOGRAPHY ADULT ORDER  2007    [place] stent to Diagonal     HC LEFT HEART CATHETERIZATION  2013    Moderate CAD     HYSTERECTOMY TOTAL ABDOMINAL       ORTHOPEDIC SURGERY      knee replacement on right side (), Left side ()     RELEASE CARPAL TUNNEL      right and left     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       Family History has been reviewed.    Social History     Tobacco Use     Smoking status: Former Smoker     Packs/day: 0.25     Types: Cigarettes     Start date:      Last attempt to quit: 1973     Years since quittin.8     Smokeless tobacco: Never Used   Substance Use Topics     Alcohol use: Yes     Alcohol/week: 0.0 - 1.0 standard drinks     Comment: rarely       Patient Active Problem List   Diagnosis     CKD (chronic kidney disease) stage 3, GFR 30-59 ml/min (H)     Morbid obesity (H)     CAD (coronary artery disease)     Type 2 diabetes mellitus with diabetic nephropathy (H)     Transient cerebral ischemia     Hyperlipidemia LDL goal <70     Ventral hernia     Intermittent asthma     Moderate major depression (H)     ELIGIO on CPAP     Microalbuminuria     S/P total knee arthroplasty     OA (osteoarthritis) of knee     Anemia due to blood loss, acute     Health Care Home     Essential hypertension     Gastroesophageal reflux disease without esophagitis     Bilateral edema of lower extremity     Osteoarthritis     Rheumatoid arthritis involving both hands with negative  rheumatoid factor (H)     High risk medication use     Anxiety     Other chronic pain     Controlled substance agreement signed     Edema of lower extremity     Nephrolithiasis     Kidney stone     Tremor     Atypical chest pain     ACS (acute coronary syndrome) (H)     Non-rheumatic mitral valve stenosis     Coronary artery calcification seen on CAT scan       Allergies   Allergen Reactions     Aspirin Hives     Reaction occurred during childhood.      Lisinopril Cough     Metformin      Elevated lactic acid     Minocycline      Yellow Dye Allergy. Minocycline has Yellow Dye #10.     Yellow Dye Hives     Rxn to yellow tablet. Eyes swelled shut.        Current Outpatient Medications   Medication Sig Dispense Refill     acetaminophen (TYLENOL) 325 MG tablet Take 325-650 mg by mouth every 6 hours as needed for mild pain       albuterol (PROAIR HFA/PROVENTIL HFA/VENTOLIN HFA) 108 (90 Base) MCG/ACT inhaler Inhale 2 puffs into the lungs every 6 hours For shortness of breath 1 Inhaler 3     atorvastatin (LIPITOR) 20 MG tablet TAKE 1 TABLET EVERY DAY 90 tablet 3     blood glucose (TRUE METRIX BLOOD GLUCOSE TEST) test strip Use to test blood glucose levels daily 100 strip 1     blood glucose monitoring (NO BRAND SPECIFIED) meter device kit Use to test blood sugar 1-2 times daily or as directed. 1 kit 0     Blood Glucose Monitoring Suppl (TRUE METRIX AIR GLUCOSE METER) W/DEVICE KIT USE AS DIRECTED 1 kit 0     buPROPion (WELLBUTRIN SR) 100 MG 12 hr tablet Take 1 tablet (100 mg) by mouth 2 times daily 180 tablet 3     Cholecalciferol (VITAMIN D) 2000 UNITS CAPS Take 2,000 Units by mouth daily        clopidogrel (PLAVIX) 75 MG tablet Take 1 tablet (75 mg) by mouth daily 90 tablet 3     clotrimazole (LOTRIMIN) 1 % external cream Apply topically 2 times daily 45 g 1     DULoxetine (CYMBALTA) 60 MG capsule Take 1 capsule (60 mg) by mouth daily for depression 90 capsule 1     fluticasone (FLONASE) 50 MCG/ACT nasal spray Spray 1  "spray into both nostrils daily as needed        fluticasone-salmeterol (WIXELA INHUB) 100-50 MCG/DOSE inhaler INHALE 1 PUFF EVERY 12 HOURS 180 Inhaler 3     glimepiride (AMARYL) 2 MG tablet Take 1 tablet (2 mg) by mouth every morning (before breakfast) 90 tablet 3     HYDROcodone-acetaminophen (NORCO) 5-325 MG tablet Take 1 tablet by mouth 2 times daily as needed for moderate to severe pain 60 tablet 0     IBANdronate (BONIVA) 150 MG tablet Take 1 tablet (150 mg) by mouth every 30 days 3 tablet 3     losartan (COZAAR) 50 MG tablet Take 1 tablet (50 mg) by mouth daily 90 tablet 3     Menthol, Topical Analgesic, (ICY HOT EX) Apply to affected area every morning       metoprolol succinate ER (TOPROL-XL) 100 MG 24 hr tablet Take 1 tablet (100 mg) by mouth daily 90 tablet 3     neomycin-polymyxin-hydrocortisone (CORTISPORIN) otic solution Place 4 drops into both ears 4 times daily (Patient taking differently: Place 4 drops into both ears 4 times daily as needed (Ear infections from swimming) ) 10 mL 0     nitroGLYcerin (NITROSTAT) 0.4 MG sublingual tablet For chest pain place 1 tablet under the tongue every 5 minutes for 3 doses. If symptoms persist 5 minutes after 1st dose call 911. 25 tablet 0     nystatin-triamcinolone (MYCOLOG II) 480902-8.1 UNIT/GM-% external cream APPLY TOPICALLY DAILY PRN 60 g 1     order for DME Equipment being ordered: diabetic shoes 1 each 0     order for DME DREAMSTATION  5-15 CM/H20  NASAL WISP FABRIC       ranitidine (ZANTAC) 150 MG tablet TAKE 1 TABLET TWICE DAILY 180 tablet 2     triamcinolone (KENALOG) 0.1 % external cream Apply topically 2 times daily On rash for you foot 60 g 1     TRUEPLUS LANCETS 28G MISC 1 each 2 times daily as needed 100 each 3       Vitals: /78   Pulse 96   Ht 1.702 m (5' 7\")   Wt 125.6 kg (277 lb)   SpO2 97%   BMI 43.38 kg/m    BMI= Body mass index is 43.38 kg/m .    EXAM:  GENERAL: Morbidly obese, alert and anxious    HEENT: moist mucus membranes, no " scleral icterus,   CARDIOVASCULAR:  RRR, No JVD  NEURO:  Alert;  well oriented to time, place and person.  RESPIRATORY: non labored breathing  NECK: Neck supple. No noticeable masses. short  ABDOMEN/GI: globose, soft, nontender, partially reducible incisional hernia  EXTREMITIES: warm and well perfused, no edema  SKIN: No suspicious lesions or rashes  LYMPH: Normal  lymph nodes    LABS/Imaging: CT was reviewed.    ASSESSMENT:  Moira Andre suffers from   1. Incisional hernia, without obstruction or gangrene  2. Morbid Obesity      PLAN:  She is starting the weight loss surgery program, once she has lost the necesary weight then a meaningful repair of her hernia will be performed.    It is my pleasure to participate in the care of Moira Andre. Thank you for this consultation.     If you have any questions please give me a call.    Best regards,  Gaurav Santana MD    Please route or send letter to:  Primary Care Provider (PCP), Referring Provider and Include Progress Note    Total time with patient visit: 45 minutes more than half spent in counseling, explanation of procedures and coordination of care.

## 2020-02-03 ENCOUNTER — TELEPHONE (OUTPATIENT)
Dept: FAMILY MEDICINE | Facility: CLINIC | Age: 85
End: 2020-02-03

## 2020-02-03 DIAGNOSIS — K21.9 GASTROESOPHAGEAL REFLUX DISEASE WITHOUT ESOPHAGITIS: Primary | ICD-10-CM

## 2020-02-03 RX ORDER — FAMOTIDINE 20 MG/1
20 TABLET, FILM COATED ORAL 2 TIMES DAILY
Qty: 180 TABLET | Refills: 1 | Status: SHIPPED | OUTPATIENT
Start: 2020-02-03 | End: 2020-09-17

## 2020-02-03 NOTE — TELEPHONE ENCOUNTER
To PCP:     Would you like to replace ranitidine with Pepcid?     Pharmacy is pended,     Thank you,   Patti HOROWITZ RN

## 2020-02-03 NOTE — TELEPHONE ENCOUNTER
Reason for Call:  Medication or medication refill:    Do you use a [facility]?  Name of the pharmacy and phone number for the current request:  NA    Name of the medication requested: ranitidine (ZANTAC) 150 MG tablet     Other request: Pt saw on the television that this Rx causes cancer. Pt is wondering if she should continue to take it?    Can we leave a detailed message on this number? YES    Phone number patient can be reached at: Cell number on file:    Telephone Information:   Mobile [phone]     Best Time: any    Call taken on 2/3/2020 at 4:11 PM by Madisyn Ramírez

## 2020-02-03 NOTE — TELEPHONE ENCOUNTER
Routing to Dr Churchill to please review patient's symptoms and info from [facility].     Per Epic med list, historical, patient has been taking Fluticasone-salmeterol for a long time.    Hoarseness is a new symptom for patient.      Please route advise back to triage for follow up with patient.     Reyna TUTTLE RN,BSN

## 2020-02-03 NOTE — TELEPHONE ENCOUNTER
Yes, hoarseness is a common side effect of steroid inhalers. She should be rinsing out her mouth after each use, which may help. Also, sometimes if this is very bothersome a helpful trick can be to change inhaler delivery devices (example from a dry powder inhaler to HFA) - although insurance coverage may be a barrier.     Jeanette Dalton, PharmD  PGY1 Medication Therapy Management Resident   [phone]

## 2020-02-03 NOTE — TELEPHONE ENCOUNTER
"To PCP:     Pt calls back to f/u on hoarseness as mentioned at OV last week.     Inquires if this can be a s/e of fluticasone-salmeterol (WIXELA INHUB) 100-50 MCG/DOSE inhaler    \"Hoarse Voice  C/o having a hoarse voice  Does not know what is causing it  Says that she does not speak much at home\"     Also routing to MTM to review/advise.     Thank you,   Patti HOROWITZ RN    "

## 2020-02-03 NOTE — TELEPHONE ENCOUNTER
Reason for call:  Patient reporting a symptom    Symptom or request: hoarseness    Duration (how long have symptoms been present): 2 weeks    Have you been treated for this before? Yes - mentioned at OV last week    Additional comments: Pt states that a side effect of Order   fluticasone-salmeterol (WIXELA INHUB) 100-50 MCG/DOSE inhaler is hoarseness. Pt is very hoarse and is wondering if this is the cause    Phone Number patient can be reached at:  Cell number on file:    Telephone Information:   Mobile [phone]     Best Time:  any    Can we leave a detailed message on this number:  YES    Call taken on 2/3/2020 at 4:13 PM by Madisyn Ramírez

## 2020-02-03 NOTE — TELEPHONE ENCOUNTER
Placed call.  Information given to patient from PCP.  States understood and agreed with advise to stop Rx Zantac and that it's being replaced with Rx Pepcid (sent to pharmacy).     Patient very appreciative.     Reyna TUTTLE RN,BSN

## 2020-02-04 NOTE — TELEPHONE ENCOUNTER
I spoke to the patient.  She has cold-like symptoms  Her hoarseness started 2 weeks ago and now she is having common cold  Discussed the symptomatic treatment  Discussed warm saline gargles  Patient says she did not have any hoarseness related to Advair I told the patient if her problem persists and does not improve after her cold is better then we would consider switching it to Advair  Patient already got 3-month supply of her other inhaler.  She has been rinsing her mouth after each use  Patient wants to wait  She will call me and let me know if problem persist after the viral illness is over  Dr.Nasima Kerline MD

## 2020-03-20 ENCOUNTER — VIRTUAL VISIT (OUTPATIENT)
Dept: FAMILY MEDICINE | Facility: CLINIC | Age: 85
End: 2020-03-20
Payer: COMMERCIAL

## 2020-03-20 DIAGNOSIS — M54.50 CHRONIC RIGHT-SIDED LOW BACK PAIN, UNSPECIFIED WHETHER SCIATICA PRESENT: ICD-10-CM

## 2020-03-20 DIAGNOSIS — G89.29 CHRONIC RIGHT-SIDED LOW BACK PAIN, UNSPECIFIED WHETHER SCIATICA PRESENT: ICD-10-CM

## 2020-03-20 DIAGNOSIS — N18.30 CKD (CHRONIC KIDNEY DISEASE) STAGE 3, GFR 30-59 ML/MIN (H): ICD-10-CM

## 2020-03-20 DIAGNOSIS — M25.551 HIP PAIN, RIGHT: Primary | ICD-10-CM

## 2020-03-20 PROCEDURE — 99441 ZZC PHYSICIAN TELEPHONE EVALUATION 5-10 MIN: CPT | Performed by: INTERNAL MEDICINE

## 2020-03-20 RX ORDER — OXYCODONE HYDROCHLORIDE 5 MG/1
5 TABLET ORAL EVERY 6 HOURS PRN
Qty: 12 TABLET | Refills: 0 | Status: SHIPPED | OUTPATIENT
Start: 2020-03-20 | End: 2020-08-17

## 2020-03-20 RX ORDER — TIZANIDINE 2 MG/1
2 TABLET ORAL 3 TIMES DAILY PRN
Qty: 20 TABLET | Refills: 1 | Status: SHIPPED | OUTPATIENT
Start: 2020-03-20 | End: 2020-08-24

## 2020-03-20 NOTE — PROGRESS NOTES
"Moira Andre is a 86 year old female who is being evaluated via a telephone visit.      The patient has been notified of following (by BOY Santos MA     \"We have found that certain health care needs can be provided without the need for a physical exam.  This service lets us provide the care you need with a short phone conversation.  If a prescription is necessary we can send it directly to your pharmacy.  If lab work is needed we can place an order for that and you can then stop by our lab to have the test done at a later time.    Since this is like an office visit,  will bill your insurance company for this service.  Please check with your medical insurance if this type of telephone/virtual is covered . You may be responsible for the cost of this service if insurance coverage is denied.  The typical cost is $30 (10min), $59(11-20min) and $85 (21-30min)     If during the course of the call the physician/provider feels a telephone visit is not appropriate, you will not be charged for this service\"    Consent has been obtained for this service by care team member: yes.  See the scanned image in the medical record.    S: The history as provided by the patient to the provider during this call include     Pertinent parts of the the patient's medical history reviewed and confirmed by the provider included :      Total time of call between patient and provider was 6 minutes      (MD signature)  ===================================================    I have reviewed the note as documented above.  This accurately captures the substance of my conversation with the patient,  Patient says that    Additional provider notes:    Patient reports that she is having back pain and hip pain and she cannot get epidural steroid injection due to current coronavirus situation  She is taking hydrocodone which is not helping  She is using icy hot  She is in lots of pain and asking something which can help her pain  She cannot take " nonsteroidal anti-inflammatory pain medication due to chronic kidney disease      Moira was seen today for refill request.    Diagnoses and all orders for this visit:    Hip pain, right  -     oxyCODONE (ROXICODONE) 5 MG tablet; Take 1 tablet (5 mg) by mouth every 6 hours as needed for pain  Patient has history of chronic pain  Due to current situation she cannot get epidural steroid injection  So I prescribed short course of oxycodone  Advised not to take hydrocodone  Discussed my concern about a stronger pain medication  Also told her to take Tylenol as needed for pain  She can use over-the-counter lidocaine patches  Advised her to inform her family about this medication so they can keep an eye on her  If it affects her mental status then they should notify us and she should stop the medication  This medication increases the risk of falling  Avoid NSAIDs due to chronic kidney disease  Patient was educated about opiod pain medication. It can be habit-forming. It should be taken as prescribed. Do not mix it  with alcohol. Be careful with driving.Do not loose the  Prescription.  Do not overuse this medication. It is a controlled substance.  Follow-up in 1 week via tele-visit    Chronic right-sided low back pain, unspecified whether sciatica present  -     oxyCODONE (ROXICODONE) 5 MG tablet; Take 1 tablet (5 mg) by mouth every 6 hours as needed for pain  -     tiZANidine (ZANAFLEX) 2 MG tablet; Take 1 tablet (2 mg) by mouth 3 times daily as needed for muscle spasms    CKD (chronic kidney disease) stage 3, GFR 30-59 ml/min (H)    Stable and she should avoid NSAIDs  Tylenol and topical pain medications are safe  Try over-the-counter lidocaine or patches      Disclaimer: This note consists of symbols derived from keyboarding, dictation and/or voice recognition software. As a result, there may be errors in the script that have gone undetected. Please consider this when interpreting information found in this  chart.

## 2020-03-20 NOTE — PATIENT INSTRUCTIONS
Verbal instructions were given  Patient was educated about opiod pain medication. It can be habit-forming. It should be taken as prescribed. Do not mix it  with alcohol. Be careful with driving.Do not loose the  Prescription.  Do not overuse this medication. It is a controlled substance.  Discontinue hydrocodone  Continue Tylenol and over-the-counter lidocaine patches  Notify the family about your pain medication so they can monitor you for change in mental status as this medication can affect mental status as well as increases the risk of falling    Follow-up in 2 weeks  Seek sooner medical attention if there is any worsening of symptoms or problems.

## 2020-05-05 DIAGNOSIS — E11.21 TYPE 2 DIABETES MELLITUS WITH DIABETIC NEPHROPATHY, WITHOUT LONG-TERM CURRENT USE OF INSULIN (H): ICD-10-CM

## 2020-05-06 RX ORDER — CALCIUM CITRATE/VITAMIN D3 200MG-6.25
TABLET ORAL
Qty: 100 STRIP | Refills: 1 | Status: SHIPPED | OUTPATIENT
Start: 2020-05-06 | End: 2021-01-14

## 2020-05-06 NOTE — TELEPHONE ENCOUNTER
Prescription approved per [facility] [place] [facility].  Patti HOROWITZ RN

## 2020-06-02 ENCOUNTER — TELEPHONE (OUTPATIENT)
Dept: CARDIOLOGY | Facility: CLINIC | Age: 85
End: 2020-06-02

## 2020-06-02 ENCOUNTER — TELEPHONE (OUTPATIENT)
Dept: FAMILY MEDICINE | Facility: CLINIC | Age: 85
End: 2020-06-02

## 2020-06-02 NOTE — TELEPHONE ENCOUNTER
Reason for Call:  Other prescription    Detailed comments: patient is having a spinal injection on 6/8  She was to stop taking plavix prior to this. The last day she took it was Saturday. She plans to start taking it the evening after she gets her injection.   She is supposed to inform Dr Churchill to make sure this is okay.     Phone Number Patient can be reached at: Home number on file [phone] (home)  No call back requested  Best Time: any    Can we leave a detailed message on this number? YES    Call taken on 6/2/2020 at 1:10 PM by Trini John

## 2020-06-02 NOTE — TELEPHONE ENCOUNTER
"Left message on generic VM greeting: \"Dr. Churchill recommends that the interventionist determines when to restart your Plavix.\"  Advised calling back with any further questions.  Agata Carballo RN on 6/2/2020 at 4:39 PM    "

## 2020-06-02 NOTE — TELEPHONE ENCOUNTER
Pt called for hold on Plavix. Informed her per previous notes by DR Sinha she is on it more for TIA, then CAD. Asked Pt to call PMD who is filling med she has not been seen her for nearly 2 yrs also. DEB Thomas RN

## 2020-06-02 NOTE — TELEPHONE ENCOUNTER
To PCP:     Please review/confirm OK with Plan:     patient is having a spinal injection on 6/8  She was to stop taking plavix. The last day she took it was Saturday 5/30. She plans to start taking it the evening after she gets her injection.    This would be a 9 day hold total     Should she restart the evening OF her injection (6/8) or the evening after (6/9) Or leave that up to  Interventionalist?     Thank you,   Patti HOROWITZ RN

## 2020-08-01 ENCOUNTER — TRANSFERRED RECORDS (OUTPATIENT)
Dept: MULTI SPECIALTY CLINIC | Facility: CLINIC | Age: 85
End: 2020-08-01

## 2020-08-14 DIAGNOSIS — M54.50 CHRONIC RIGHT-SIDED LOW BACK PAIN, UNSPECIFIED WHETHER SCIATICA PRESENT: ICD-10-CM

## 2020-08-14 DIAGNOSIS — M25.551 HIP PAIN, RIGHT: ICD-10-CM

## 2020-08-14 DIAGNOSIS — G89.29 CHRONIC RIGHT-SIDED LOW BACK PAIN, UNSPECIFIED WHETHER SCIATICA PRESENT: ICD-10-CM

## 2020-08-14 NOTE — TELEPHONE ENCOUNTER
Reason for Call:  Medication or medication refill:    Do you use a [facility]?  Name of the pharmacy and phone number for the current request:  [id number] - [place], [place] - [address]    Name of the medication requested: oxyCODONE (ROXICODONE) 5 MG tablet    Other request:    Patient said pain was bad took her 30 min to get out of bed this morning. She wants to get this filled today if possible    Can we leave a detailed message on this number? YES    Phone number patient can be reached at: Home number on file [phone] (home)    Best Time: any    Call taken on 8/14/2020 at 11:12 AM by Trini John

## 2020-08-14 NOTE — TELEPHONE ENCOUNTER
Controlled Substance Refill Request for oxyCODONE (ROXICODONE) 5 MG tablet  Problem List Complete:    Yes    Last Written Prescription Date:  3/23/20  Last Fill Quantity: 12,   # refills: 0      Last Office Visit with [facility] primary care provider: 3/20/20    Future Office visit:   Next 5 appointments (look out 90 days)    Oct 19, 2020  1:15 PM CDT  Return Visit with Jozef Sinha MD  [facility] ([facility]) [address] [place] [id number]  [phone] OPT 2          Controlled substance agreement:   Encounter-Level CSA - 06/28/2016:    Controlled Substance Agreement - Scan on 7/1/2016  3:22 PM: CONTROLLED SUBSTANCE AGREEMENT     Patient-Level CSA:    There are no patient-level csa.         Last Urine Drug Screen: No results found for: CDAUT, No results found for: COMDAT,   Cannabinoids (63-ohu-5-carboxy-9-THC)   Date Value Ref Range Status   06/19/2019 Not Detected NDET^Not Detected ng/mL Final     Comment:     Cutoff for a negative cannabinoid is 50 ng/mL or less.     Phencyclidine (Phencyclidine)   Date Value Ref Range Status   06/19/2019 Not Detected NDET^Not Detected ng/mL Final     Comment:     Cutoff for a negative PCP is 25 ng/mL or less.     Cocaine (Benzoylecgonine)   Date Value Ref Range Status   06/19/2019 Not Detected NDET^Not Detected ng/mL Final     Comment:     Cutoff for a negative cocaine is 150 ng/ml or less.     Methamphetamine (d-Methamphetamine)   Date Value Ref Range Status   06/19/2019 Not Detected NDET^Not Detected ng/mL Final     Comment:     Cutoff for a negative methamphetamine is 500 ng/ml or less.     Opiates (Morphine)   Date Value Ref Range Status   06/19/2019 Not Detected NDET^Not Detected ng/mL Final     Comment:     Cutoff for a negative opiate is 100 ng/ml or less.     Amphetamine (d-Amphetamine)   Date Value Ref Range Status   06/19/2019 Not Detected NDET^Not Detected ng/mL Final     Comment:     Cutoff for a  negative amphetamine is 500 ng/mL or less.     Benzodiazepines (Nordiazepam)   Date Value Ref Range Status   06/19/2019 Not Detected NDET^Not Detected ng/mL Final     Comment:     Cutoff for a negative benzodiazepine is 150 ng/ml or less.     Tricyclic Antidepressants (Desipramine)   Date Value Ref Range Status   06/19/2019 Not Detected NDET^Not Detected ng/mL Final     Comment:     Cutoff for a negative tricyclic antidepressant is 300 ng/ml or less.     Methadone (Methadone)   Date Value Ref Range Status   06/19/2019 Not Detected NDET^Not Detected ng/mL Final     Comment:     Cutoff for a negative methadone is 200 ng/ml or less.     Barbiturates (Butalbital)   Date Value Ref Range Status   06/19/2019 Not Detected NDET^Not Detected ng/mL Final     Comment:     Cutoff for a negative barbituate is 200 ng/ml or less.     Oxycodone (Oxycodone)   Date Value Ref Range Status   06/19/2019 Not Detected NDET^Not Detected ng/mL Final     Comment:     Cutoff for a negative Oxycodone is 100 ng/mL or less.     Propoxyphene (Norpropoxyphene)   Date Value Ref Range Status   06/19/2019 Not Detected NDET^Not Detected ng/mL Final     Comment:     Cutoff for a negative propoxyphene is 300 ng/ml or less     Buprenorphine (Buprenorphine)   Date Value Ref Range Status   06/19/2019 Not Detected NDET^Not Detected ng/mL Final     Comment:     Cutoff for a negative buprenorphine is 10 ng/ml or less        Processing:  Rx to be electronically transmitted to pharmacy by provider     [email or web address]   checked in past 3 months?  No, route to RN

## 2020-08-17 ENCOUNTER — NURSE TRIAGE (OUTPATIENT)
Dept: FAMILY MEDICINE | Facility: CLINIC | Age: 85
End: 2020-08-17

## 2020-08-17 ENCOUNTER — TELEPHONE (OUTPATIENT)
Dept: FAMILY MEDICINE | Facility: CLINIC | Age: 85
End: 2020-08-17
Payer: COMMERCIAL

## 2020-08-17 ENCOUNTER — TELEPHONE (OUTPATIENT)
Dept: FAMILY MEDICINE | Facility: CLINIC | Age: 85
End: 2020-08-17

## 2020-08-17 RX ORDER — OXYCODONE HYDROCHLORIDE 5 MG/1
5 TABLET ORAL EVERY 6 HOURS PRN
Qty: 12 TABLET | Refills: 0 | Status: SHIPPED | OUTPATIENT
Start: 2020-08-17 | End: 2020-08-24

## 2020-08-17 RX ORDER — TIZANIDINE 2 MG/1
2 TABLET ORAL 3 TIMES DAILY
Status: CANCELLED | OUTPATIENT
Start: 2020-08-17

## 2020-08-17 NOTE — TELEPHONE ENCOUNTER
Routing to Dr Churchill for advise.     Spoke with patient today regarding complaint of neck pain, radiates to both shoulders and down to mid-back.     Onset x 1 wk ago.   No known cause.     Patient has been using OTC Icy Hot---icing and applying heat---with only minimal relief.     Patient changed Virtual Visit tomorrow---for clinic visit with PCP next Monday.     Patient asking if OK to take Zanaflex 2 mg tabs to help relieve neck pain, if muscle spasms?   Patient has the med at home. No need for RX.   Please advise.     Reyna TUTTLE RN,BSN

## 2020-08-17 NOTE — TELEPHONE ENCOUNTER
I don't think it will do much more than make her tired and greatly increase her risk of falling and injuring herself, I don't think it is worth it

## 2020-08-17 NOTE — TELEPHONE ENCOUNTER
Symptoms    Describe your symptoms: neck and shoulder pain so bad has a hard time getting out of bed    Any pain: Yes: patient feels it is really bad    How long have you been having symptoms: over the weekend      Have you been seen for this:  No    Appointment offered?: Yes: she didn't want to come in for a face to face    Triage offered?: Yes: I stated the care team will call her and give her advise    Home remedies tried: Frozen spray and creams    Requested Pharmacy: n/a    Okay to leave a detailed message? No at Home number on file [phone] (home)

## 2020-08-17 NOTE — TELEPHONE ENCOUNTER
Reason for Call:  Same Day Appointment, Requested Provider:  Maryan Churchill MD    PCP: Maryan Churchill    Reason for visit: neck and back pain    Duration of symptoms: 1+ weeks    Have you been treated for this in the past? No    Additional comments: patient was initially scheduled for a virtual visit on 8/18 but called back to reschedule as she's a concerned that a virtual visit would not allow the doctor to actually touch her neck for examination. She is currently scheduled for F2F on  8/24 but would love to come sooner if anything opens up    Can we leave a detailed message on this number? YES    Phone number patient can be reached at: Home number on file [phone] (home)    Best Time: any    Call taken on 8/17/2020 at 12:31 PM by Rupal Real

## 2020-08-24 ENCOUNTER — OFFICE VISIT (OUTPATIENT)
Dept: FAMILY MEDICINE | Facility: CLINIC | Age: 85
End: 2020-08-24
Payer: COMMERCIAL

## 2020-08-24 VITALS
WEIGHT: 271 LBS | TEMPERATURE: 97.1 F | SYSTOLIC BLOOD PRESSURE: 139 MMHG | HEIGHT: 67 IN | DIASTOLIC BLOOD PRESSURE: 70 MMHG | HEART RATE: 88 BPM | OXYGEN SATURATION: 96 % | BODY MASS INDEX: 42.53 KG/M2

## 2020-08-24 DIAGNOSIS — E11.21 TYPE 2 DIABETES MELLITUS WITH DIABETIC NEPHROPATHY, WITHOUT LONG-TERM CURRENT USE OF INSULIN (H): ICD-10-CM

## 2020-08-24 DIAGNOSIS — I25.10 CORONARY ARTERY DISEASE INVOLVING NATIVE CORONARY ARTERY OF NATIVE HEART WITHOUT ANGINA PECTORIS: ICD-10-CM

## 2020-08-24 DIAGNOSIS — M25.611 SHOULDER JOINT STIFFNESS, BILATERAL: ICD-10-CM

## 2020-08-24 DIAGNOSIS — M43.6 STIFF NECK: Primary | ICD-10-CM

## 2020-08-24 DIAGNOSIS — M25.612 SHOULDER JOINT STIFFNESS, BILATERAL: ICD-10-CM

## 2020-08-24 DIAGNOSIS — E78.5 HYPERLIPIDEMIA LDL GOAL <70: ICD-10-CM

## 2020-08-24 DIAGNOSIS — M25.551 HIP PAIN, RIGHT: ICD-10-CM

## 2020-08-24 DIAGNOSIS — I10 ESSENTIAL HYPERTENSION: ICD-10-CM

## 2020-08-24 DIAGNOSIS — F33.1 MODERATE EPISODE OF RECURRENT MAJOR DEPRESSIVE DISORDER (H): ICD-10-CM

## 2020-08-24 DIAGNOSIS — F32.1 MODERATE MAJOR DEPRESSION (H): ICD-10-CM

## 2020-08-24 LAB
ALBUMIN SERPL-MCNC: 3.2 G/DL (ref 3.4–5)
ALP SERPL-CCNC: 69 U/L (ref 40–150)
ALT SERPL W P-5'-P-CCNC: 13 U/L (ref 0–50)
ANION GAP SERPL CALCULATED.3IONS-SCNC: 9 MMOL/L (ref 3–14)
AST SERPL W P-5'-P-CCNC: 12 U/L (ref 0–45)
BILIRUB SERPL-MCNC: 0.3 MG/DL (ref 0.2–1.3)
BUN SERPL-MCNC: 21 MG/DL (ref 7–30)
CALCIUM SERPL-MCNC: 9.1 MG/DL (ref 8.5–10.1)
CHLORIDE SERPL-SCNC: 107 MMOL/L (ref 94–109)
CHOLEST SERPL-MCNC: 123 MG/DL
CK SERPL-CCNC: 68 U/L (ref 30–225)
CO2 SERPL-SCNC: 21 MMOL/L (ref 20–32)
CREAT SERPL-MCNC: 1.5 MG/DL (ref 0.52–1.04)
CRP SERPL-MCNC: 10 MG/L (ref 0–8)
ERYTHROCYTE [SEDIMENTATION RATE] IN BLOOD BY WESTERGREN METHOD: 36 MM/H (ref 0–30)
GFR SERPL CREATININE-BSD FRML MDRD: 31 ML/MIN/{1.73_M2}
GLUCOSE SERPL-MCNC: 120 MG/DL (ref 70–99)
HBA1C MFR BLD: 6.2 % (ref 0–5.6)
HDLC SERPL-MCNC: 43 MG/DL
LDLC SERPL CALC-MCNC: 40 MG/DL
NONHDLC SERPL-MCNC: 80 MG/DL
POTASSIUM SERPL-SCNC: 4.6 MMOL/L (ref 3.4–5.3)
PROT SERPL-MCNC: 7.4 G/DL (ref 6.8–8.8)
SODIUM SERPL-SCNC: 138 MMOL/L (ref 133–144)
TRIGL SERPL-MCNC: 201 MG/DL
TSH SERPL DL<=0.005 MIU/L-ACNC: 1.39 MU/L (ref 0.4–4)

## 2020-08-24 PROCEDURE — 80061 LIPID PANEL: CPT | Performed by: INTERNAL MEDICINE

## 2020-08-24 PROCEDURE — 84443 ASSAY THYROID STIM HORMONE: CPT | Performed by: INTERNAL MEDICINE

## 2020-08-24 PROCEDURE — 99214 OFFICE O/P EST MOD 30 MIN: CPT | Performed by: INTERNAL MEDICINE

## 2020-08-24 PROCEDURE — 80053 COMPREHEN METABOLIC PANEL: CPT | Performed by: INTERNAL MEDICINE

## 2020-08-24 PROCEDURE — 82550 ASSAY OF CK (CPK): CPT | Performed by: INTERNAL MEDICINE

## 2020-08-24 PROCEDURE — 86140 C-REACTIVE PROTEIN: CPT | Performed by: INTERNAL MEDICINE

## 2020-08-24 PROCEDURE — 83036 HEMOGLOBIN GLYCOSYLATED A1C: CPT | Performed by: INTERNAL MEDICINE

## 2020-08-24 PROCEDURE — 85652 RBC SED RATE AUTOMATED: CPT | Performed by: INTERNAL MEDICINE

## 2020-08-24 PROCEDURE — 36415 COLL VENOUS BLD VENIPUNCTURE: CPT | Performed by: INTERNAL MEDICINE

## 2020-08-24 RX ORDER — TIZANIDINE 2 MG/1
2 TABLET ORAL 3 TIMES DAILY PRN
Qty: 20 TABLET | Refills: 1 | Status: SHIPPED | OUTPATIENT
Start: 2020-08-24 | End: 2020-12-14

## 2020-08-24 RX ORDER — OXYCODONE HYDROCHLORIDE 5 MG/1
5 TABLET ORAL 2 TIMES DAILY PRN
Qty: 20 TABLET | Refills: 0 | Status: SHIPPED | OUTPATIENT
Start: 2020-08-24 | End: 2020-12-14

## 2020-08-24 RX ORDER — BUPROPION HYDROCHLORIDE 100 MG/1
100 TABLET, EXTENDED RELEASE ORAL 2 TIMES DAILY
Qty: 180 TABLET | Refills: 3 | Status: SHIPPED | OUTPATIENT
Start: 2020-08-24 | End: 2020-11-16

## 2020-08-24 RX ORDER — DULOXETIN HYDROCHLORIDE 60 MG/1
60 CAPSULE, DELAYED RELEASE ORAL DAILY
Qty: 90 CAPSULE | Refills: 1 | Status: SHIPPED | OUTPATIENT
Start: 2020-08-24 | End: 2020-11-16

## 2020-08-24 RX ORDER — METOPROLOL SUCCINATE 100 MG/1
100 TABLET, EXTENDED RELEASE ORAL DAILY
Qty: 90 TABLET | Refills: 3 | Status: SHIPPED | OUTPATIENT
Start: 2020-08-24 | End: 2020-11-16

## 2020-08-24 RX ORDER — LOSARTAN POTASSIUM 50 MG/1
50 TABLET ORAL DAILY
Qty: 90 TABLET | Refills: 3 | Status: SHIPPED | OUTPATIENT
Start: 2020-08-24 | End: 2020-11-16

## 2020-08-24 RX ORDER — ATORVASTATIN CALCIUM 20 MG/1
20 TABLET, FILM COATED ORAL DAILY
Qty: 90 TABLET | Refills: 3 | Status: SHIPPED | OUTPATIENT
Start: 2020-08-24 | End: 2020-10-19

## 2020-08-24 RX ORDER — CLOPIDOGREL BISULFATE 75 MG/1
75 TABLET ORAL DAILY
Qty: 90 TABLET | Refills: 3 | Status: SHIPPED | OUTPATIENT
Start: 2020-08-24 | End: 2020-10-01

## 2020-08-24 RX ORDER — GLIMEPIRIDE 2 MG/1
2 TABLET ORAL
Qty: 90 TABLET | Refills: 3 | Status: SHIPPED | OUTPATIENT
Start: 2020-08-24 | End: 2020-11-16

## 2020-08-24 ASSESSMENT — MIFFLIN-ST. JEOR: SCORE: 1701.88

## 2020-08-24 ASSESSMENT — PATIENT HEALTH QUESTIONNAIRE - PHQ9: SUM OF ALL RESPONSES TO PHQ QUESTIONS 1-9: 1

## 2020-08-24 NOTE — PROGRESS NOTES
"Subjective     Moira Andre is a 86 year old female who presents to clinic today for the following health issues:    HPI   Chief Complaint   Patient presents with     Pain     Stiff/pain in the neck that radiates to shoulders and upper back         Complaining of stiffness of neck for two weeks  Upper shoulder also  She Sprays icy hot which works great   Cortisone shot did not work on hip  Pain is worse in evening  Today is better day  She uses oxycodone only sparingly  But loves icy hot spray   Denied any hip and lower back pain          Review of Systems   Constitutional, HEENT, cardiovascular, pulmonary, gi and gu systems are negative, except as otherwise noted.      Objective    /70   Pulse 88   Temp 97.1  F (36.2  C) (Skin)   Ht 1.702 m (5' 7\")   Wt 122.9 kg (271 lb)   SpO2 96%   BMI 42.44 kg/m    Body mass index is 42.44 kg/m .  Physical Exam   GENERAL: healthy, alert and no distress  NECK: no adenopathy,  RESP: lungs clear to auscultation - no rales, rhonchi or wheezes  CV: regular rate and rhythm, normal S1 S2, no S3 or S4, no murmur, click or rub, no peripheral edema and peripheral pulses strong  ABDOMEN: soft, nontender, no hepatosplenomegaly, no masses and bowel sounds normal  Able to move neck except for turning on left side she has some difficulty.  No localized tenderness        Assessment & Plan     Moira was seen today for pain.    Diagnoses and all orders for this visit:    Stiff neck  -     CK total  -     ESR: Erythrocyte sedimentation rate  -     CRP, inflammation  -     [facility] PT, HAND, AND CHIROPRACTIC REFERRAL; Future  -     oxyCODONE (ROXICODONE) 5 MG tablet; Take 1 tablet (5 mg) by mouth 2 times daily as needed for pain  PMR is unlikely as does not has other symptoms  Discussed symptomatic treatment  PHYSICL THERAPY  Oxycodone can be habit-forming. It should be taken as prescribed. Do not mix it  with alcohol. Be careful with driving.Do not loose the  Prescription.  Do not overuse " this medication. It is a controlled substance.  Muscle relaxer  If no improvement then referral to specialist and imaging.  Educated risk of fall with use of oxycodone   She uses cautiously.    Shoulder joint stiffness, bilateral  -     [facility] PT, HAND, AND CHIROPRACTIC REFERRAL; Future  -     oxyCODONE (ROXICODONE) 5 MG tablet; Take 1 tablet (5 mg) by mouth 2 times daily as needed for pain  It is improving.    Type 2 diabetes mellitus with diabetic nephropathy, without long-term current use of insulin (H)  -     glimepiride (AMARYL) 2 MG tablet; Take 1 tablet (2 mg) by mouth every morning (before breakfast)  -     TSH with free T4 reflex  -     Lipid panel reflex to direct LDL Non-fasting  -     Hemoglobin A1c  -     Comprehensive metabolic panel    Moderate episode of recurrent major depressive disorder (H)  -     buPROPion (WELLBUTRIN SR) 100 MG 12 hr tablet; Take 1 tablet (100 mg) by mouth 2 times daily    Hyperlipidemia LDL goal <70  -     atorvastatin (LIPITOR) 20 MG tablet; Take 1 tablet (20 mg) by mouth daily for cholestrol    Moderate major depression (H)  -     DULoxetine (CYMBALTA) 60 MG capsule; Take 1 capsule (60 mg) by mouth daily for depression    Essential hypertension  -     metoprolol succinate ER (TOPROL-XL) 100 MG 24 hr tablet; Take 1 tablet (100 mg) by mouth daily  -     losartan (COZAAR) 50 MG tablet; Take 1 tablet (50 mg) by mouth daily for Blood Pressure    Coronary artery disease involving native coronary artery of native heart without angina pectoris  -     clopidogrel (PLAVIX) 75 MG tablet; Take 1 tablet (75 mg) by mouth daily    Hip pain, right  -     oxyCODONE (ROXICODONE) 5 MG tablet; Take 1 tablet (5 mg) by mouth 2 times daily as needed for pain    Other orders  -     tiZANidine (ZANAFLEX) 2 MG tablet; Take 1 tablet (2 mg) by mouth 3 times daily as needed for muscle spasms         BMI:   Estimated body mass index is 42.44 kg/m  as calculated from the following:    Height as of this  "encounter: 1.702 m (5' 7\").    Weight as of this encounter: 122.9 kg (271 lb).           See Patient Instructions  Patient Instructions   Labs today  Make appointment for PHYSICL THERAPY  Follow up in 4 months  Seek sooner medical attention if there is any worsening of symptoms or problems.        Return in about 4 months (around 12/24/2020) for medication follow up, Diabetes.    Maryan Churchill MD  [facility]    "

## 2020-08-24 NOTE — PATIENT INSTRUCTIONS
Labs today  Make appointment for PHYSICL THERAPY  Follow up in 4 months  Seek sooner medical attention if there is any worsening of symptoms or problems.

## 2020-08-24 NOTE — LETTER
August 25, 2020      Moira ALEGRIA Jes  [address]  [facility] [id number]        Mert Pizarro,     This is to inform you regarding your test result.     I spoke to you on phone but sending you a result note also.   TSH which is thyroid hormone is normal.   Your total cholesterol is normal.   The triglycerides are high. Lowering  the amount of sugar ,alcohol and sweets in the diet helps to control this.Exercise and weight loss helps.   HDL which is called good cholesterol is low.   Your LDL cholesterol is normal.  This is often call bad cholesterol and high levels increase the risk for heart attacks and strokes.   Eat low cholesterol low fat  diet and do regular physical activity. Avoid high sugar containing food.   Chronic kidney disease is stable   Numbers are slightly worse.   Stay well hydrated .   No results found for: CKTOTAL   C-reactive protein which is test to check for inflammation is minimally elevated but numbers are stable.   Sed rate is slightly elevated but numbers are stable.   HbA1c which is average glucose during last 3 months is 6.2%.   Your diabetes is under control.       Resulted Orders   TSH with free T4 reflex   Result Value Ref Range    TSH 1.39 0.40 - 4.00 mU/L   Lipid panel reflex to direct LDL Non-fasting   Result Value Ref Range    Cholesterol 123 <200 mg/dL    Triglycerides 201 (H) <150 mg/dL      Comment:      Borderline high:  150-199 mg/dl  High:             200-499 mg/dl  Very high:       >499 mg/dl      HDL Cholesterol 43 (L) >49 mg/dL    LDL Cholesterol Calculated 40 <100 mg/dL      Comment:      Desirable:       <100 mg/dl    Non HDL Cholesterol 80 <130 mg/dL   Hemoglobin A1c   Result Value Ref Range    Hemoglobin A1C 6.2 (H) 0 - 5.6 %      Comment:      Normal <5.7% Prediabetes 5.7-6.4%  Diabetes 6.5% or higher - adopted from ADA   consensus guidelines.     Comprehensive metabolic panel   Result Value Ref Range    Sodium 138 133 - 144 mmol/L    Potassium 4.6 3.4 - 5.3  mmol/L    Chloride 107 94 - 109 mmol/L    Carbon Dioxide 21 20 - 32 mmol/L    Anion Gap 9 3 - 14 mmol/L    Glucose 120 (H) 70 - 99 mg/dL    Urea Nitrogen 21 7 - 30 mg/dL    Creatinine 1.50 (H) 0.52 - 1.04 mg/dL    GFR Estimate 31 (L) >60 mL/min/[1.73_m2]      Comment:      Non  GFR Calc  Starting 12/18/2018, serum creatinine based estimated GFR (eGFR) will be   calculated using the Chronic Kidney Disease Epidemiology Collaboration   (CKD-EPI) equation.      GFR Estimate If Black 36 (L) >60 mL/min/[1.73_m2]      Comment:       GFR Calc  Starting 12/18/2018, serum creatinine based estimated GFR (eGFR) will be   calculated using the Chronic Kidney Disease Epidemiology Collaboration   (CKD-EPI) equation.      Calcium 9.1 8.5 - 10.1 mg/dL    Bilirubin Total 0.3 0.2 - 1.3 mg/dL    Albumin 3.2 (L) 3.4 - 5.0 g/dL    Protein Total 7.4 6.8 - 8.8 g/dL    Alkaline Phosphatase 69 40 - 150 U/L    ALT 13 0 - 50 U/L    AST 12 0 - 45 U/L   CK total   Result Value Ref Range    CK Total 68 30 - 225 U/L   ESR: Erythrocyte sedimentation rate   Result Value Ref Range    Sed Rate 36 (H) 0 - 30 mm/h   CRP, inflammation   Result Value Ref Range    CRP Inflammation 10.0 (H) 0.0 - 8.0 mg/L     Sincerely,       Dr.Nasima Kerline MD,FACP

## 2020-08-25 ENCOUNTER — TELEPHONE (OUTPATIENT)
Dept: FAMILY MEDICINE | Facility: CLINIC | Age: 85
End: 2020-08-25

## 2020-08-25 ASSESSMENT — ASTHMA QUESTIONNAIRES: ACT_TOTALSCORE: 22

## 2020-08-25 NOTE — RESULT ENCOUNTER NOTE
Please notify patient by sending following letter with copy of test results      Mert Pizarro,    This is to inform you regarding your test result.    I spoke to you on phone but sending you a result note also.  TSH which is thyroid hormone is normal.  Your total cholesterol is normal.  The triglycerides are high. Lowering  the amount of sugar ,alcohol and sweets in the diet helps to control this.Exercise and weight loss helps.  HDL which is called good cholesterol is low.  Your LDL cholesterol is normal.  This is often call bad cholesterol and high levels increase the risk for heart attacks and strokes.  Eat low cholesterol low fat  diet and do regular physical activity. Avoid high sugar containing food.  Chronic kidney disease is stable   Numbers are slightly worse.  Stay well hydrated .  No results found for: CKTOTAL  C-reactive protein which is test to check for inflammation is minimally elevated but numbers are stable.  Sed rate is slightly elevated but numbers are stable.  HbA1c which is average glucose during last 3 months is 6.2%.  Your diabetes is under control.        Sincerely,      Dr.Nasima Kerline MD,FACP

## 2020-08-25 NOTE — TELEPHONE ENCOUNTER
Reason for Call: Request for a referral:    Order or referral being requested: Physical Therapy    Date needed: as soon as possible    Has the patient been seen by the PCP for this problem? YES    Additional comments: [facility] would like a call regarding this referral, patient has an appointment tomorrow 8/26 and they need a verbal approval . [phone]    Phone number Patient can be reached at:  Home number on file [phone] (home)    Best Time:  any    Can we leave a detailed message on this number?  YES    Call taken on 8/25/2020 at 10:26 AM by Rupal Real

## 2020-08-25 NOTE — TELEPHONE ENCOUNTER
Called [facility] but they said everything looks good on their end  Called pt and informed her that everything should be good to go    Carrillo AMEZCUA RN

## 2020-08-26 ENCOUNTER — THERAPY VISIT (OUTPATIENT)
Dept: PHYSICAL THERAPY | Facility: CLINIC | Age: 85
End: 2020-08-26
Attending: INTERNAL MEDICINE
Payer: COMMERCIAL

## 2020-08-26 DIAGNOSIS — M43.6 STIFF NECK: ICD-10-CM

## 2020-08-26 DIAGNOSIS — M25.611 SHOULDER JOINT STIFFNESS, BILATERAL: ICD-10-CM

## 2020-08-26 DIAGNOSIS — M54.2 CERVICAL PAIN: ICD-10-CM

## 2020-08-26 DIAGNOSIS — M25.612 SHOULDER JOINT STIFFNESS, BILATERAL: ICD-10-CM

## 2020-08-26 PROCEDURE — 97140 MANUAL THERAPY 1/> REGIONS: CPT | Mod: GP | Performed by: PHYSICAL THERAPIST

## 2020-08-26 PROCEDURE — 97161 PT EVAL LOW COMPLEX 20 MIN: CPT | Mod: GP | Performed by: PHYSICAL THERAPIST

## 2020-08-26 PROCEDURE — 97110 THERAPEUTIC EXERCISES: CPT | Mod: GP | Performed by: PHYSICAL THERAPIST

## 2020-08-26 NOTE — PROGRESS NOTES
[facility] Initial Evaluation    Physical Therapy Initial Examination/Evaluation  August 26, 2020    Moira Andre  is a 86 year old  female referred to physical therapy by Dr. Churchill for treatment of cervical pain.      DOI/onset 8-10-20  Mechanism of injury Patient had an onset of severe neck pain without incident that last 1 week. The pain has steadily improved since then to where she has very little pain or problems today.  DOS n/a  Prior treatment none.     Chief Complaint:  Neck stiffness.   Pain location: B cervical/scapular area  Quality: aching, stiffness  Constant/Intermittent: intermittent  Time of day: worse in evening  Symptoms have improved significantly since onset.    Current pain 1/10.  Pain at best 1/10.  Pain at worst 8/10 at onset.    Symptoms aggravated by prolonged computer use, reading.    Symptoms improved with movement.     Social history:  Lives alone.    Occupation: retired.     Patient having difficulty with ADLs: none.    Patient's goals are to prevent the problem from returning.    Patient reports general health as good.  Related medical history some neck pain in years past.    Surgical History:  none.    Imaging: none.    Medications:  none.       Return to MD:  As needed.      Clinical Impression: Patient has had significant improvement from the time she saw her MD to now where she presents today with little to no neck pain. Her only complaint is minor neck stiffness. She still came into PT today to learn prevention exercises. No further PT visits will be needed unless increased problems arise.    Subjective:    Patient Health History             Pertinent medical history includes: asthma, depression, diabetes, high blood pressure, incontinence, osteoarthritis, overweight and sleep disorder/apnea.     Medical allergies: Aspirin, y dye.   Surgeries include:  Orthopedic surgery, heart surgery and other (2 knee, 2 stints, gall bladder & appendix).    Current  medications:  Anti-depressants and high blood pressure medication.    Occupation: Retired clown.   Primary job tasks include:  Driving, lifting/carrying, prolonged sitting and repetitive tasks.                                    Objective:  Standing Alignment:    Cervical/Thoracic:  Forward head and Dowager's hump  Shoulder/UE:  Rounded shoulders                  Flexibility/Screens:   Positive screens:  Cervical  Upper Extremity:    Decreased left upper extremity flexibility at:  Pectoralis Major    Decreased right upper extremity flexibility present at:  Pectoralis Major    Spine:  Decreased left spine flexibility:  Upper Trap    Decreased right spine flexibility:  Upper Trap                  Cervical/Thoracic Evaluation    AROM:  AROM Cervical:    Flexion:            Normal  Extension:       25%  Rotation:         Left: 50%     Right: 75%  Side Bend:      Left: 25%     Right:  25%    Strength: B shoulders 4/5  Headaches: none  Cervical Myotomes:  normal                      Cervical Dermatomes:  normal                    Cervical Palpation:  : diffuse MF tightness/tenderness in B upper traps and scapular musculature.  Tenderness present at Left:    Rhomboids and Upper Trap  Tenderness present at Right:    Rhomboids and Upper Trap      Spinal Segmental Conclusions:    Level:  Hypo at C6, C7, T1, T2, T3 and T4                                                General     ROS    Assessment/Plan:    Patient is a 86 year old female with cervical complaints.    Patient has the following significant findings with corresponding treatment plan.                Diagnosis 1:  Cervical pain  Pain -  manual therapy, self management and education  Decreased ROM/flexibility - manual therapy and therapeutic exercise  Impaired muscle performance - neuro re-education  Decreased function - therapeutic activities  Impaired posture - neuro re-education    Therapy Evaluation Codes:   1) History comprised of:   Personal factors that impact  the plan of care:      None.    Comorbidity factors that impact the plan of care are:      Osteoarthritis.     Medications impacting care: None.  2) Examination of Body Systems comprised of:   Body structures and functions that impact the plan of care:      Cervical spine.   Activity limitations that impact the plan of care are:      Reading/Computer work.  3) Clinical presentation characteristics are:   Stable/Uncomplicated.  4) Decision-Making    Low complexity using standardized patient assessment instrument and/or measureable assessment of functional outcome.  Cumulative Therapy Evaluation is: Low complexity.    Previous and current functional limitations:  (See Goal Flow Sheet for this information)    Short term and Long term goals: (See Goal Flow Sheet for this information)     Communication ability:  Patient appears to be able to clearly communicate and understand verbal and written communication and follow directions correctly.  Treatment Explanation - The following has been discussed with the patient:   RX ordered/plan of care  Anticipated outcomes  Possible risks and side effects  This patient would benefit from PT intervention to resume normal activities.   Rehab potential is good.    Frequency:  1 X week, once daily  Duration:  for 1 weeks  Discharge Plan:  Achieve all LTG.  Independent in home treatment program.  Reach maximal therapeutic benefit.    Please refer to the daily flowsheet for treatment today, total treatment time and time spent performing 1:1 timed codes.

## 2020-09-30 DIAGNOSIS — I25.10 CORONARY ARTERY DISEASE INVOLVING NATIVE CORONARY ARTERY OF NATIVE HEART WITHOUT ANGINA PECTORIS: ICD-10-CM

## 2020-10-01 RX ORDER — CLOPIDOGREL BISULFATE 75 MG/1
TABLET ORAL
Qty: 90 TABLET | Refills: 0 | Status: SHIPPED | OUTPATIENT
Start: 2020-10-01 | End: 2021-02-16

## 2020-10-01 NOTE — TELEPHONE ENCOUNTER
Medication is being filled for 1 time refill only due to:  Patient needs to be seen because due for follow up appointment in December.

## 2020-10-19 ENCOUNTER — OFFICE VISIT (OUTPATIENT)
Dept: CARDIOLOGY | Facility: CLINIC | Age: 85
End: 2020-10-19
Payer: COMMERCIAL

## 2020-10-19 VITALS
BODY MASS INDEX: 44.95 KG/M2 | OXYGEN SATURATION: 97 % | DIASTOLIC BLOOD PRESSURE: 76 MMHG | SYSTOLIC BLOOD PRESSURE: 124 MMHG | HEART RATE: 102 BPM | WEIGHT: 287 LBS

## 2020-10-19 DIAGNOSIS — I05.9 MITRAL VALVE DISEASE: ICD-10-CM

## 2020-10-19 DIAGNOSIS — I35.9 AORTIC VALVE DISORDER: Primary | ICD-10-CM

## 2020-10-19 PROCEDURE — 99214 OFFICE O/P EST MOD 30 MIN: CPT | Performed by: INTERNAL MEDICINE

## 2020-10-19 NOTE — PROGRESS NOTES
Service Date: 10/19/2020      CARDIOLOGY OFFICE VISIT      HISTORY OF PRESENT ILLNESS:  I had the pleasure of following up on our mutual patient, Moira Andre.  She is a delightful, most pleasant 87-year-old woman.  We met her about 13 years ago when she presented with an acute coronary syndrome.  She had bare-metal stents to the OM1 and the diagonal.  This was in 2 different sittings.  She had a followup angiogram in 2013.  Fortunately, it was not an acute coronary syndrome and the stents were open and she had her last angiogram in 2017 and again, there was some chest pain.  It was not clear what the cause was.  The 2017 angiogram shows left main artery normal, LAD had a 10% narrowing proximally and a 20%-30% narrowing in the midportion.  The diagonal stent was widely patent.  The second diagonal had a 40% narrowing.  The left circumflex had a 50%-60% narrowing before the first obtuse marginal.  The ramus branch had a stent in it and it was 30% narrowed.  The right coronary artery had up to 25% narrowing.  Flow wire was checked across the circumflex lesions and it was in the normal range.  It was felt that that was a noncardiac problem.  That chest pain has not recurred.  She does describe some sharp fleeting chest pains in her right chest.  I think that she is pointing to the costochondral junction.  I could not precipitate it or illicit it during today's exam.  She also has known valvular disease.  A question has been raised if she had rheumatic fever or not.  Her last echo was 2018.  It showed hyperdynamic left ventricle, EF greater than 70%.  Right ventricle was mildly enlarged.  She has mild to mildly moderate mitral stenosis with a mean pressure gradient across the mitral valve that was between 4 and 7 mmHg.  She has mild aortic valve stenosis with a mean gradient across the aortic valve of 13 mmHg.  She has mild pulmonary hypertension with right ventricular systolic pressure of 36 plus RA pressure.       PHYSICAL EXAMINATION:  Today, the pertinent findings are:    RESPIRATORY:  Her lungs are clear.    CARDIAC:  Her cardiac sounds are quite abnormal.  She does indeed have 2 distinct murmurs, probably the AS and perhaps some MR since most of them were systolic but she has either a significant splitting of her heart sound or an opening snap.        I would like to get another echocardiogram.  She is going to be switching her insurance from [employer] to something else if [employer] do not come together, so before her current [employer] insurance ends, we will go ahead and plan on doing an echo.  It will be 2 years anyway, so it was appropriate.  We want to make sure her heart valves have not worsened.  She does report exertional shortness of breath which would be the signs of heart valve disease but she states she has always had that.  Some of this is no doubt due to her weight.  I did review the blood work that you did in August with her.  She has classic metabolic syndrome with low HDL, high triglyceride, high blood sugar.  Her hemoglobin A1c is rather well controlled at 6.2%.  Her creatinine is elevated to 1.5 and that is similar to previous numbers.  We will schedule her for an EKG to make sure she does not have a bundle branch block and an echocardiogram to look at heart function but more importantly, the mitral and aortic valve and the pulmonary pressures before the end of the year.      This visit was 25 minutes, greater than 50% counseling.      Jozef Correa MD       cc:   Maryan Churchill MD    [facility]   [address]   [place] [address]         JOZEF CORREA MD             D: 10/19/2020   T: 10/19/2020   MT: ROSIE      Name:     JOSIE ARRIAGA   MRN:      [id number]        Account:      [id number]   :      1933           Service Date: 10/19/2020      Document: [id number]

## 2020-10-19 NOTE — PROGRESS NOTES
HPI and Plan:   See dictation    No orders of the defined types were placed in this encounter.    No orders of the defined types were placed in this encounter.    There are no discontinued medications.      No diagnosis found.    CURRENT MEDICATIONS:  Current Outpatient Medications   Medication Sig Dispense Refill     acetaminophen (TYLENOL) 325 MG tablet Take 325-650 mg by mouth every 6 hours as needed for mild pain       albuterol (PROAIR HFA/PROVENTIL HFA/VENTOLIN HFA) 108 (90 Base) MCG/ACT inhaler Inhale 2 puffs into the lungs every 6 hours For shortness of breath 1 Inhaler 3     buPROPion (WELLBUTRIN SR) 100 MG 12 hr tablet Take 1 tablet (100 mg) by mouth 2 times daily 180 tablet 3     Cholecalciferol (VITAMIN D) 2000 UNITS CAPS Take 2,000 Units by mouth daily        clopidogrel (PLAVIX) 75 MG tablet TAKE 1 TABLET EVERY DAY 90 tablet 0     DULoxetine (CYMBALTA) 60 MG capsule Take 1 capsule (60 mg) by mouth daily for depression 90 capsule 1     famotidine (PEPCID) 20 MG tablet TAKE 1 TABLET TWICE DAILY (REPLACES RANITIDINE) 180 tablet 3     fluticasone (FLONASE) 50 MCG/ACT nasal spray Spray 1 spray into both nostrils daily as needed        fluticasone-salmeterol (WIXELA INHUB) 100-50 MCG/DOSE inhaler INHALE 1 PUFF EVERY 12 HOURS 180 Inhaler 3     glimepiride (AMARYL) 2 MG tablet Take 1 tablet (2 mg) by mouth every morning (before breakfast) 90 tablet 3     IBANdronate (BONIVA) 150 MG tablet Take 1 tablet (150 mg) by mouth every 30 days 3 tablet 3     losartan (COZAAR) 50 MG tablet Take 1 tablet (50 mg) by mouth daily for Blood Pressure 90 tablet 3     Menthol, Topical Analgesic, (ICY HOT EX) Apply to affected area every morning       metoprolol succinate ER (TOPROL-XL) 100 MG 24 hr tablet Take 1 tablet (100 mg) by mouth daily 90 tablet 3     nitroGLYcerin (NITROSTAT) 0.4 MG sublingual tablet For chest pain place 1 tablet under the tongue every 5 minutes for 3 doses. If symptoms persist 5 minutes after 1st dose  call 911. 25 tablet 0     nystatin-triamcinolone (MYCOLOG II) 520925-0.1 UNIT/GM-% external cream APPLY TOPICALLY DAILY PRN 60 g 1     oxyCODONE (ROXICODONE) 5 MG tablet Take 1 tablet (5 mg) by mouth 2 times daily as needed for pain 20 tablet 0     tiZANidine (ZANAFLEX) 2 MG tablet Take 1 tablet (2 mg) by mouth 3 times daily as needed for muscle spasms 20 tablet 1     atorvastatin (LIPITOR) 20 MG tablet TAKE 1 TABLET EVERY DAY 90 tablet 2     blood glucose monitoring (NO BRAND SPECIFIED) meter device kit Use to test blood sugar 1-2 times daily or as directed. 1 kit 0     Blood Glucose Monitoring Suppl (TRUE METRIX AIR GLUCOSE METER) W/DEVICE KIT USE AS DIRECTED 1 kit 0     clotrimazole (LOTRIMIN) 1 % external cream Apply topically 2 times daily 45 g 1     neomycin-polymyxin-hydrocortisone (CORTISPORIN) otic solution Place 4 drops into both ears 4 times daily (Patient not taking: Reported on 10/19/2020) 10 mL 0     order for DME Equipment being ordered: diabetic shoes 1 each 0     order for DME DREAMSTATION  5-15 CM/H20  NASAL WISP [facility]       triamcinolone (KENALOG) 0.1 % external cream Apply topically 2 times daily On rash for you foot 60 g 1     TRUE METRIX BLOOD GLUCOSE TEST test strip TEST BLOOD GLUCOSE EVERY  strip 1     TRUEPLUS LANCETS 28G MISC 1 each 2 times daily as needed 100 each 3       ALLERGIES     Allergies   Allergen Reactions     Aspirin Hives     Reaction occurred during childhood.      Lisinopril Cough     Metformin      Elevated lactic acid     Minocycline      Yellow Dye Allergy. Minocycline has Yellow Dye #10.     Yellow Dye Hives     Rxn to yellow tablet. Eyes swelled shut.        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     Myocardial infarction (H) 2007, cath  ml    BMS: stent to OM, diag, nl EF, echo /C angia  , f/u cath no lesion >40%     Nephrolithiasis     right side     OA (osteoarthritis) of knee      Obesity      Rheumatoid arthritis flare (H)     prednisone     Sleep apnea     restarted using cpap      TIA (transient ischaemic attack)      Ventral hernia, unspecified, without mention of obstruction or gangrene        PAST SURGICAL HISTORY:  Past Surgical History:   Procedure Laterality Date     APPENDECTOMY       BIOPSY BREAST      x2 -needle & lumpectomy-benign     CHOLECYSTECTOMY       CORONARY ANGIOGRAPHY ADULT ORDER  2007    Bare metal stent to OM1, Diagonal patent      CORONARY ANGIOGRAPHY ADULT ORDER  2007    [place] stent to Diagonal     HC LEFT HEART CATHETERIZATION  2013    Moderate CAD     HYSTERECTOMY TOTAL ABDOMINAL       ORTHOPEDIC SURGERY      knee replacement on right side (), Left side ()     RELEASE CARPAL TUNNEL      right and left     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       FAMILY HISTORY:  Family History   Problem Relation Age of Onset     Neurologic Disorder Mother         MS - at 60's     C.A.D. Father          at 8o's, ? prostate ca     Breast Cancer No family hx of      Cancer - colorectal No family hx of        SOCIAL HISTORY:  Social History     Socioeconomic History     Marital status:      Spouse name: None     Number of children: None     Years of education: None     Highest education level: None   Occupational History     None   Social Needs     Financial resource strain: None     Food insecurity     Worry: None     Inability: None      Transportation needs     Medical: None     Non-medical: None   Tobacco Use     Smoking status: Former Smoker     Packs/day: 0.25     Types: Cigarettes     Start date:      Quit date: 1973     Years since quittin.5     Smokeless tobacco: Never Used   Substance and Sexual Activity     Alcohol use: Yes     Alcohol/week: 0.0 - 1.0 standard drinks     Comment: rarely     Drug use: No     Sexual activity: Not Currently     Partners: Male   Lifestyle     Physical activity     Days per week: None     Minutes per session: None     Stress: None   Relationships     Social connections     Talks on phone: None     Gets together: None     Attends [employer] service: None     Active member of club or organization: None     Attends meetings of clubs or organizations: None     Relationship status: None     Intimate partner violence     Fear of current or ex partner: None     Emotionally abused: None     Physically abused: None     Forced sexual activity: None   Other Topics Concern     Parent/sibling w/ CABG, MI or angioplasty before 65F 55M? Not Asked      Service Not Asked     Blood Transfusions Not Asked     Caffeine Concern No     Occupational Exposure Not Asked     Hobby Hazards Not Asked     Sleep Concern No     Stress Concern No     Weight Concern No     Special Diet No     Back Care Not Asked     Exercise Yes     Comment: walking, swimming x2 a week     Bike Helmet Not Asked     Seat Belt Yes     Self-Exams Not Asked   Social History Narrative    , 1 step son    Work- clown for profession        Tobacco- none,smoked for couple months in     ETOH- occ 1/2 months    Diet coke- 2-3 cans/day    Exercise- swims 8 laps -3/week               Review of Systems:  Skin:  Negative     Eyes:  Positive for glasses  ENT:  Positive for nasal congestion;hearing loss  Respiratory:  Positive for shortness of breath;dyspnea on exertion;cough;wheezing  Cardiovascular:  Negative    Gastroenterology: Negative     Genitourinary:  not assessed    Musculoskeletal:  Positive for joint pain  Neurologic:  Negative    Psychiatric:  Negative    Heme/Lymph/Imm:  Negative    Endocrine:  Positive for diabetes    Physical Exam:  Vitals: BP (!) 163/84   Pulse 102   Wt 130.2 kg (287 lb)   SpO2 97%   BMI 44.95 kg/m      Constitutional:  cooperative, alert and oriented, well developed, well nourished, in no acute distress        Skin:  warm and dry to the touch, no apparent skin lesions or masses noted          Head:  normocephalic, no masses or lesions        Eyes:  pupils equal and round, conjunctivae and lids unremarkable, sclera white, no xanthalasma, EOMS intact, no nystagmus        Lymph:No Cervical lymphadenopathy present;No thyromegaly     ENT:           Neck:  carotid pulses are full and equal bilaterally, JVP normal, no carotid bruit   thick neck, difficult exam    Respiratory:  normal breath sounds, clear to auscultation, normal A-P diameter, normal symmetry, normal respiratory excursion, no use of accessory muscles         Cardiac: regular rhythm occasional premature beats     systolic ejection murmur;grade 2 early systolic murmur;grade 1   ?split s2 vs OS                                           GI:  abdomen soft, non-tender, BS normoactive, no mass, no HSM, no bruits obese difficult exam due to obesity, +bs    Extremities and Muscular Skeletal:  no deformities, clubbing, cyanosis, erythema observed   trace          Neurological:  no gross motor deficits   uses walker    Psych:  Alert and Oriented x 3      Recent Lab Results:  LIPID RESULTS:  Lab Results   Component Value Date    CHOL 123 08/24/2020    HDL 43 (L) 08/24/2020    LDL 40 08/24/2020    TRIG 201 (H) 08/24/2020    CHOLHDLRATIO 2.3 10/13/2015       LIVER ENZYME RESULTS:  Lab Results   Component Value Date    AST 12 08/24/2020    ALT 13 08/24/2020       CBC RESULTS:  Lab Results   Component Value Date    WBC 8.9 04/01/2019    RBC 4.68 04/01/2019    HGB 12.4  04/01/2019    HCT 39.8 04/01/2019    MCV 85 04/01/2019    MCH 26.5 04/01/2019    MCHC 31.2 (L) 04/01/2019    RDW 15.1 (H) 04/01/2019     04/01/2019       BMP RESULTS:  Lab Results   Component Value Date     08/24/2020    POTASSIUM 4.6 08/24/2020    CHLORIDE 107 08/24/2020    CO2 21 08/24/2020    ANIONGAP 9 08/24/2020     (H) 08/24/2020    BUN 21 08/24/2020    CR 1.50 (H) 08/24/2020    GFRESTIMATED 31 (L) 08/24/2020    GFRESTBLACK 36 (L) 08/24/2020    TELLY 9.1 08/24/2020        A1C RESULTS:  Lab Results   Component Value Date    A1C 6.2 (H) 08/24/2020       INR RESULTS:  Lab Results   Component Value Date    INR 0.93 07/19/2017    INR 0.99 09/21/2016           CC  Maryan Churchill MD  [id number]  [place] [id number]

## 2020-10-19 NOTE — LETTER
10/19/2020    Maryan Churchill MD  [address]  [facility] [address]    RE: Moira Andre       Dear Colleague,    I had the pleasure of seeing Moira Andre in the [facility].    HPI and Plan:   See dictation    No orders of the defined types were placed in this encounter.    No orders of the defined types were placed in this encounter.    There are no discontinued medications.      No diagnosis found.    CURRENT MEDICATIONS:  Current Outpatient Medications   Medication Sig Dispense Refill     acetaminophen (TYLENOL) 325 MG tablet Take 325-650 mg by mouth every 6 hours as needed for mild pain       albuterol (PROAIR HFA/PROVENTIL HFA/VENTOLIN HFA) 108 (90 Base) MCG/ACT inhaler Inhale 2 puffs into the lungs every 6 hours For shortness of breath 1 Inhaler 3     buPROPion (WELLBUTRIN SR) 100 MG 12 hr tablet Take 1 tablet (100 mg) by mouth 2 times daily 180 tablet 3     Cholecalciferol (VITAMIN D) 2000 UNITS CAPS Take 2,000 Units by mouth daily        clopidogrel (PLAVIX) 75 MG tablet TAKE 1 TABLET EVERY DAY 90 tablet 0     DULoxetine (CYMBALTA) 60 MG capsule Take 1 capsule (60 mg) by mouth daily for depression 90 capsule 1     famotidine (PEPCID) 20 MG tablet TAKE 1 TABLET TWICE DAILY (REPLACES RANITIDINE) 180 tablet 3     fluticasone (FLONASE) 50 MCG/ACT nasal spray Spray 1 spray into both nostrils daily as needed        fluticasone-salmeterol (WIXELA INHUB) 100-50 MCG/DOSE inhaler INHALE 1 PUFF EVERY 12 HOURS 180 Inhaler 3     glimepiride (AMARYL) 2 MG tablet Take 1 tablet (2 mg) by mouth every morning (before breakfast) 90 tablet 3     IBANdronate (BONIVA) 150 MG tablet Take 1 tablet (150 mg) by mouth every 30 days 3 tablet 3     losartan (COZAAR) 50 MG tablet Take 1 tablet (50 mg) by mouth daily for Blood Pressure 90 tablet 3     Menthol, Topical Analgesic, (ICY HOT EX) Apply to affected area every morning       metoprolol succinate ER (TOPROL-XL) 100 MG 24  hr tablet Take 1 tablet (100 mg) by mouth daily 90 tablet 3     nitroGLYcerin (NITROSTAT) 0.4 MG sublingual tablet For chest pain place 1 tablet under the tongue every 5 minutes for 3 doses. If symptoms persist 5 minutes after 1st dose call 911. 25 tablet 0     nystatin-triamcinolone (MYCOLOG II) 853959-8.1 UNIT/GM-% external cream APPLY TOPICALLY DAILY PRN 60 g 1     oxyCODONE (ROXICODONE) 5 MG tablet Take 1 tablet (5 mg) by mouth 2 times daily as needed for pain 20 tablet 0     tiZANidine (ZANAFLEX) 2 MG tablet Take 1 tablet (2 mg) by mouth 3 times daily as needed for muscle spasms 20 tablet 1     atorvastatin (LIPITOR) 20 MG tablet TAKE 1 TABLET EVERY DAY 90 tablet 2     blood glucose monitoring (NO BRAND SPECIFIED) meter device kit Use to test blood sugar 1-2 times daily or as directed. 1 kit 0     Blood Glucose Monitoring Suppl (TRUE METRIX AIR GLUCOSE METER) W/DEVICE KIT USE AS DIRECTED 1 kit 0     clotrimazole (LOTRIMIN) 1 % external cream Apply topically 2 times daily 45 g 1     neomycin-polymyxin-hydrocortisone (CORTISPORIN) otic solution Place 4 drops into both ears 4 times daily (Patient not taking: Reported on 10/19/2020) 10 mL 0     order for DME Equipment being ordered: diabetic shoes 1 each 0     order for DME DREAMSTATION  5-15 CM/H20  NASAL WISP [employer]RIC       triamcinolone (KENALOG) 0.1 % external cream Apply topically 2 times daily On rash for you foot 60 g 1     TRUE METRIX BLOOD GLUCOSE TEST test strip TEST BLOOD GLUCOSE EVERY  strip 1     TRUEPLUS LANCETS 28G MISC 1 each 2 times daily as needed 100 each 3       ALLERGIES     Allergies   Allergen Reactions     Aspirin Hives     Reaction occurred during childhood.      Lisinopril Cough     Metformin      Elevated lactic acid     Minocycline      Yellow Dye Allergy. Minocycline has Yellow Dye #10.     Yellow Dye Hives     Rxn to yellow tablet. Eyes swelled shut.        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     Myocardial infarction (H) 2007, cath 2013 ml    BMS: stent to OM, diag, nl EF, echo /C angia  , f/u cath no lesion >40%     Nephrolithiasis     right side     OA (osteoarthritis) of knee      Obesity      Rheumatoid arthritis flare (H)     prednisone     Sleep apnea     restarted using cpap      TIA (transient ischaemic attack)      Ventral hernia, unspecified, without mention of obstruction or gangrene        PAST SURGICAL HISTORY:  Past Surgical History:   Procedure Laterality Date     APPENDECTOMY       BIOPSY BREAST      x2 -needle & lumpectomy-benign     CHOLECYSTECTOMY       CORONARY ANGIOGRAPHY ADULT ORDER  2007    Bare metal stent to OM1, Diagonal patent      CORONARY ANGIOGRAPHY ADULT ORDER  2007    [place] stent to Diagonal     HC LEFT HEART CATHETERIZATION  2013    Moderate CAD     HYSTERECTOMY TOTAL ABDOMINAL       ORTHOPEDIC SURGERY      knee replacement on right side (), Left side ()     RELEASE CARPAL TUNNEL      right and left     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       FAMILY HISTORY:  Family History   Problem Relation Age of Onset     Neurologic Disorder Mother         MS - at 60's     C.A.D. Father          at 8o's, ? prostate ca     Breast Cancer No family hx of      Cancer - colorectal No family hx of        SOCIAL HISTORY:  Social History     Socioeconomic History     Marital status:      Spouse name: None     Number of  children: None     Years of education: None     Highest education level: None   Occupational History     None   Social Needs     Financial resource strain: None     Food insecurity     Worry: None     Inability: None     Transportation needs     Medical: None     Non-medical: None   Tobacco Use     Smoking status: Former Smoker     Packs/day: 0.25     Types: Cigarettes     Start date:      Quit date: 1973     Years since quittin.5     Smokeless tobacco: Never Used   Substance and Sexual Activity     Alcohol use: Yes     Alcohol/week: 0.0 - 1.0 standard drinks     Comment: rarely     Drug use: No     Sexual activity: Not Currently     Partners: Male   Lifestyle     Physical activity     Days per week: None     Minutes per session: None     Stress: None   Relationships     Social connections     Talks on phone: None     Gets together: None     Attends [employer] service: None     Active member of club or organization: None     Attends meetings of clubs or organizations: None     Relationship status: None     Intimate partner violence     Fear of current or ex partner: None     Emotionally abused: None     Physically abused: None     Forced sexual activity: None   Other Topics Concern     Parent/sibling w/ CABG, MI or angioplasty before 65F 55M? Not Asked      Service Not Asked     Blood Transfusions Not Asked     Caffeine Concern No     Occupational Exposure Not Asked     Hobby Hazards Not Asked     Sleep Concern No     Stress Concern No     Weight Concern No     Special Diet No     Back Care Not Asked     Exercise Yes     Comment: walking, swimming x2 a week     Bike Helmet Not Asked     Seat Belt Yes     Self-Exams Not Asked   Social History Narrative    , 1 step son    Work- clown for profession        Tobacco- none,smoked for couple months in     ETOH- occ 1/2 months    Diet coke- 2-3 cans/day    Exercise- swims 8 laps -3/week               Review of Systems:  Skin:  Negative      Eyes:  Positive for glasses  ENT:  Positive for nasal congestion;hearing loss  Respiratory:  Positive for shortness of breath;dyspnea on exertion;cough;wheezing  Cardiovascular:  Negative    Gastroenterology: Negative    Genitourinary:  not assessed    Musculoskeletal:  Positive for joint pain  Neurologic:  Negative    Psychiatric:  Negative    Heme/Lymph/Imm:  Negative    Endocrine:  Positive for diabetes    Physical Exam:  Vitals: BP (!) 163/84   Pulse 102   Wt 130.2 kg (287 lb)   SpO2 97%   BMI 44.95 kg/m      Constitutional:  cooperative, alert and oriented, well developed, well nourished, in no acute distress        Skin:  warm and dry to the touch, no apparent skin lesions or masses noted          Head:  normocephalic, no masses or lesions        Eyes:  pupils equal and round, conjunctivae and lids unremarkable, sclera white, no xanthalasma, EOMS intact, no nystagmus        Lymph:No Cervical lymphadenopathy present;No thyromegaly     ENT:           Neck:  carotid pulses are full and equal bilaterally, JVP normal, no carotid bruit   thick neck, difficult exam    Respiratory:  normal breath sounds, clear to auscultation, normal A-P diameter, normal symmetry, normal respiratory excursion, no use of accessory muscles         Cardiac: regular rhythm occasional premature beats     systolic ejection murmur;grade 2 early systolic murmur;grade 1   ?split s2 vs OS                                           GI:  abdomen soft, non-tender, BS normoactive, no mass, no HSM, no bruits obese difficult exam due to obesity, +bs    Extremities and Muscular Skeletal:  no deformities, clubbing, cyanosis, erythema observed   trace          Neurological:  no gross motor deficits   uses walker    Psych:  Alert and Oriented x 3      Recent Lab Results:  LIPID RESULTS:  Lab Results   Component Value Date    CHOL 123 08/24/2020    HDL 43 (L) 08/24/2020    LDL 40 08/24/2020    TRIG 201 (H) 08/24/2020    CHOLHDLRATIO 2.3 10/13/2015        LIVER ENZYME RESULTS:  Lab Results   Component Value Date    AST 12 08/24/2020    ALT 13 08/24/2020       CBC RESULTS:  Lab Results   Component Value Date    WBC 8.9 04/01/2019    RBC 4.68 04/01/2019    HGB 12.4 04/01/2019    HCT 39.8 04/01/2019    MCV 85 04/01/2019    MCH 26.5 04/01/2019    MCHC 31.2 (L) 04/01/2019    RDW 15.1 (H) 04/01/2019     04/01/2019       BMP RESULTS:  Lab Results   Component Value Date     08/24/2020    POTASSIUM 4.6 08/24/2020    CHLORIDE 107 08/24/2020    CO2 21 08/24/2020    ANIONGAP 9 08/24/2020     (H) 08/24/2020    BUN 21 08/24/2020    CR 1.50 (H) 08/24/2020    GFRESTIMATED 31 (L) 08/24/2020    GFRESTBLACK 36 (L) 08/24/2020    TELLY 9.1 08/24/2020        A1C RESULTS:  Lab Results   Component Value Date    A1C 6.2 (H) 08/24/2020       INR RESULTS:  Lab Results   Component Value Date    INR 0.93 07/19/2017    INR 0.99 09/21/2016           CC  Maryan Churchill MD  [address]  [place] [id number]      Thank you for allowing me to participate in the care of your patient.      Sincerely,     Jozef Sinha MD     [facility]    cc:   Maryan Churchill MD  [address]  [place] [id number]

## 2020-10-19 NOTE — LETTER
10/19/2020      Maryan Churchill MD  [address]  [place] [address]      RE: Moira Andre       Dear Colleague,    I had the pleasure of seeing Moira Andre in the [facility].    Service Date: 10/19/2020      CARDIOLOGY OFFICE VISIT      HISTORY OF PRESENT ILLNESS:  I had the pleasure of following up on our mutual patient, Moira Andre.  She is a delightful, most pleasant 87-year-old woman.  We met her about 13 years ago when she presented with an acute coronary syndrome.  She had bare-metal stents to the OM1 and the diagonal.  This was in 2 different sittings.  She had a followup angiogram in 2013.  Fortunately, it was not an acute coronary syndrome and the stents were open and she had her last angiogram in 2017 and again, there was some chest pain.  It was not clear what the cause was.  The 2017 angiogram shows left main artery normal, LAD had a 10% narrowing proximally and a 20%-30% narrowing in the midportion.  The diagonal stent was widely patent.  The second diagonal had a 40% narrowing.  The left circumflex had a 50%-60% narrowing before the first obtuse marginal.  The ramus branch had a stent in it and it was 30% narrowed.  The right coronary artery had up to 25% narrowing.  Flow wire was checked across the circumflex lesions and it was in the normal range.  It was felt that that was a noncardiac problem.  That chest pain has not recurred.  She does describe some sharp fleeting chest pains in her right chest.  I think that she is pointing to the costochondral junction.  I could not precipitate it or illicit it during today's exam.  She also has known valvular disease.  A question has been raised if she had rheumatic fever or not.  Her last echo was 2018.  It showed hyperdynamic left ventricle, EF greater than 70%.  Right ventricle was mildly enlarged.  She has mild to mildly moderate mitral stenosis with a mean pressure gradient across the mitral valve that was  between 4 and 7 mmHg.  She has mild aortic valve stenosis with a mean gradient across the aortic valve of 13 mmHg.  She has mild pulmonary hypertension with right ventricular systolic pressure of 36 plus RA pressure.      PHYSICAL EXAMINATION:  Today, the pertinent findings are:    RESPIRATORY:  Her lungs are clear.    CARDIAC:  Her cardiac sounds are quite abnormal.  She does indeed have 2 distinct murmurs, probably the AS and perhaps some MR since most of them were systolic but she has either a significant splitting of her heart sound or an opening snap.        I would like to get another echocardiogram.  She is going to be switching her insurance from [employer] to something else if [employer] and [employer] do not come together, so before her current [employer] insurance ends, we will go ahead and plan on doing an echo.  It will be 2 years anyway, so it was appropriate.  We want to make sure her heart valves have not worsened.  She does report exertional shortness of breath which would be the signs of heart valve disease but she states she has always had that.  Some of this is no doubt due to her weight.  I did review the blood work that you did in August with her.  She has classic metabolic syndrome with low HDL, high triglyceride, high blood sugar.  Her hemoglobin A1c is rather well controlled at 6.2%.  Her creatinine is elevated to 1.5 and that is similar to previous numbers.  We will schedule her for an EKG to make sure she does not have a bundle branch block and an echocardiogram to look at heart function but more importantly, the mitral and aortic valve and the pulmonary pressures before the end of the year.      This visit was 25 minutes, greater than 50% counseling.      Jozef Correa MD       cc:   Maryan Churchill MD    [facility]   [address] [facility] [address]   [place] [address]         JOZEF CORREA MD             D: 10/19/2020   T: 10/19/2020   MT: ROSIE      Name:     JOSIE ARRIAGA   MRN:       [id number]        Account:      [id number]   :      1933           Service Date: 10/19/2020      Document: [id number]          Outpatient Encounter Medications as of 10/19/2020   Medication Sig Dispense Refill     acetaminophen (TYLENOL) 325 MG tablet Take 325-650 mg by mouth every 6 hours as needed for mild pain       albuterol (PROAIR HFA/PROVENTIL HFA/VENTOLIN HFA) 108 (90 Base) MCG/ACT inhaler Inhale 2 puffs into the lungs every 6 hours For shortness of breath 1 Inhaler 3     buPROPion (WELLBUTRIN SR) 100 MG 12 hr tablet Take 1 tablet (100 mg) by mouth 2 times daily 180 tablet 3     Cholecalciferol (VITAMIN D) 2000 UNITS CAPS Take 2,000 Units by mouth daily        clopidogrel (PLAVIX) 75 MG tablet TAKE 1 TABLET EVERY DAY 90 tablet 0     DULoxetine (CYMBALTA) 60 MG capsule Take 1 capsule (60 mg) by mouth daily for depression 90 capsule 1     famotidine (PEPCID) 20 MG tablet TAKE 1 TABLET TWICE DAILY (REPLACES RANITIDINE) 180 tablet 3     fluticasone (FLONASE) 50 MCG/ACT nasal spray Spray 1 spray into both nostrils daily as needed        fluticasone-salmeterol (WIXELA INHUB) 100-50 MCG/DOSE inhaler INHALE 1 PUFF EVERY 12 HOURS 180 Inhaler 3     glimepiride (AMARYL) 2 MG tablet Take 1 tablet (2 mg) by mouth every morning (before breakfast) 90 tablet 3     IBANdronate (BONIVA) 150 MG tablet Take 1 tablet (150 mg) by mouth every 30 days 3 tablet 3     losartan (COZAAR) 50 MG tablet Take 1 tablet (50 mg) by mouth daily for Blood Pressure 90 tablet 3     Menthol, Topical Analgesic, (ICY HOT EX) Apply to affected area every morning       metoprolol succinate ER (TOPROL-XL) 100 MG 24 hr tablet Take 1 tablet (100 mg) by mouth daily 90 tablet 3     nitroGLYcerin (NITROSTAT) 0.4 MG sublingual tablet For chest pain place 1 tablet under the tongue every 5 minutes for 3 doses. If symptoms persist 5 minutes after 1st dose call 911. 25 tablet 0     nystatin-triamcinolone (MYCOLOG II) 505837-0.1 UNIT/GM-% external  cream APPLY TOPICALLY DAILY PRN 60 g 1     oxyCODONE (ROXICODONE) 5 MG tablet Take 1 tablet (5 mg) by mouth 2 times daily as needed for pain 20 tablet 0     tiZANidine (ZANAFLEX) 2 MG tablet Take 1 tablet (2 mg) by mouth 3 times daily as needed for muscle spasms 20 tablet 1     [DISCONTINUED] atorvastatin (LIPITOR) 20 MG tablet Take 1 tablet (20 mg) by mouth daily for cholestrol 90 tablet 3     blood glucose monitoring (NO BRAND SPECIFIED) meter device kit Use to test blood sugar 1-2 times daily or as directed. 1 kit 0     Blood Glucose Monitoring Suppl (TRUE METRIX AIR GLUCOSE METER) W/DEVICE KIT USE AS DIRECTED 1 kit 0     clotrimazole (LOTRIMIN) 1 % external cream Apply topically 2 times daily 45 g 1     neomycin-polymyxin-hydrocortisone (CORTISPORIN) otic solution Place 4 drops into both ears 4 times daily (Patient not taking: Reported on 10/19/2020) 10 mL 0     order for DME Equipment being ordered: diabetic shoes 1 each 0     order for DME DREAMSTATION  5-15 CM/H20  NASAL WISP FABRIC       triamcinolone (KENALOG) 0.1 % external cream Apply topically 2 times daily On rash for you foot 60 g 1     TRUE METRIX BLOOD GLUCOSE TEST test strip TEST BLOOD GLUCOSE EVERY  strip 1     TRUEPLUS LANCETS 28G MISC 1 each 2 times daily as needed 100 each 3     No facility-administered encounter medications on file as of 10/19/2020.        Again, thank you for allowing me to participate in the care of your patient.      Sincerely,    Jozef Sinha MD     [facility]

## 2020-11-09 ENCOUNTER — HOSPITAL ENCOUNTER (OUTPATIENT)
Dept: CARDIOLOGY | Facility: CLINIC | Age: 85
Discharge: HOME | End: 2020-11-09
Attending: INTERNAL MEDICINE | Admitting: INTERNAL MEDICINE
Payer: COMMERCIAL

## 2020-11-09 DIAGNOSIS — I05.9 MITRAL VALVE DISEASE: ICD-10-CM

## 2020-11-09 DIAGNOSIS — I25.10 CORONARY ARTERY CALCIFICATION SEEN ON CAT SCAN: Primary | ICD-10-CM

## 2020-11-09 DIAGNOSIS — I35.9 AORTIC VALVE DISORDER: ICD-10-CM

## 2020-11-09 PROCEDURE — 93306 TTE W/DOPPLER COMPLETE: CPT

## 2020-11-09 PROCEDURE — 255N000002 HC RX 255 OP 636: Performed by: INTERNAL MEDICINE

## 2020-11-09 PROCEDURE — 93306 TTE W/DOPPLER COMPLETE: CPT | Mod: 26 | Performed by: INTERNAL MEDICINE

## 2020-11-09 RX ADMIN — HUMAN ALBUMIN MICROSPHERES AND PERFLUTREN 9 ML: 10; .22 INJECTION, SOLUTION INTRAVENOUS at 14:43

## 2020-11-11 ENCOUNTER — OFFICE VISIT (OUTPATIENT)
Dept: CARDIOLOGY | Facility: CLINIC | Age: 85
End: 2020-11-11
Attending: INTERNAL MEDICINE
Payer: COMMERCIAL

## 2020-11-11 VITALS
HEART RATE: 98 BPM | OXYGEN SATURATION: 95 % | SYSTOLIC BLOOD PRESSURE: 134 MMHG | BODY MASS INDEX: 42.6 KG/M2 | DIASTOLIC BLOOD PRESSURE: 68 MMHG | WEIGHT: 272 LBS

## 2020-11-11 DIAGNOSIS — I05.9 MITRAL VALVE DISEASE: Primary | ICD-10-CM

## 2020-11-11 DIAGNOSIS — I35.9 AORTIC VALVE DISORDER: ICD-10-CM

## 2020-11-11 PROCEDURE — 99213 OFFICE O/P EST LOW 20 MIN: CPT | Performed by: NURSE PRACTITIONER

## 2020-11-11 PROCEDURE — 93000 ELECTROCARDIOGRAM COMPLETE: CPT | Performed by: NURSE PRACTITIONER

## 2020-11-11 NOTE — LETTER
11/11/2020    Maryan Churchill MD  [address]  [place] [address]    RE: Moira Andre       Dear Colleague,    I had the pleasure of seeing Moira Andre in the [facility].    History of Present Illness:    Moira Andre is a 87 year old female followed here by Dr. Sinha. Thirteen years ago, she had acute coronary syndrome and had BMS to the OM-1 and diagonal vessels in a staged manner. Follow up angiogram in 2013 showed stents were open; another angiogram in 2017 for chest pain noting left main artery normal, LAD had a 10% narrowing proximally and a 20%-30% narrowing in the midportion.  The diagonal stent was widely patent.  The second diagonal had a 40% narrowing.  The left circumflex had a 50%-60% narrowing before the first obtuse marginal.  The ramus branch had a stent in it and it was 30% narrowed.  The right coronary artery had up to 25% narrowing.  Flow wire was checked across the circumflex lesions and it was in the normal range.  It was felt that that was a noncardiac problem.  That chest pain has not recurred.     She has known valvular disease, unclear if it is rheumatic. Last echo in 2018 showed hyperdynamic LVEF of over 70%. RV was midly enlarged, mild to moderate MS with mean gradient of 4-7, mild aortic stenosis with mean gradient of 13mmHg.    Repeat echo now  for exertional dyspnea.  -She tells me today that she thinks she has dyspnea as she is limited by her back/spinal stenosis  -severe MAC, mean gradient 7mmHg with heart rate of   -mean aortic valve gradient 10mmHg  EF 60-65%; IVC collapses; RVSP could not be approximated  This was reviewed with Dr. Sinha    EKG: sinus with first degree AV block  (I have verified with Dr. Simpson)    Creatinine is stable at 1.5  8-2020: HDL is low at 43; TG high at 201 with A1C 6.2%  LDL 40        Impression/Plan:     1. CAD as noted above  -no angina  -continue medical management    2. Valvular Heart  disease  Mitral stenosis-stable  Aortic stenosis-stable  EF preserved  Recheck echo in 2 years      3. Dyslipidemia-controlled  -lipitor 20mg daily    4, CKD-stable  Baseline creatinine 1.37-1.5    It has been a pleasure seeing Moira Andre in follow up. We will see her back in 2 years with an echo or sooner if need be.    Nickolas Mott, MSN, APRN-BC, CNP  Cardiology    No orders of the defined types were placed in this encounter.    No orders of the defined types were placed in this encounter.    There are no discontinued medications.      Encounter Diagnoses   Name Primary?     Aortic valve disorder      Mitral valve disease        CURRENT MEDICATIONS:  Current Outpatient Medications   Medication Sig Dispense Refill     acetaminophen (TYLENOL) 325 MG tablet Take 325-650 mg by mouth every 6 hours as needed for mild pain       albuterol (PROAIR HFA/PROVENTIL HFA/VENTOLIN HFA) 108 (90 Base) MCG/ACT inhaler Inhale 2 puffs into the lungs every 6 hours For shortness of breath 1 Inhaler 3     atorvastatin (LIPITOR) 20 MG tablet TAKE 1 TABLET EVERY DAY 90 tablet 2     blood glucose monitoring (NO BRAND SPECIFIED) meter device kit Use to test blood sugar 1-2 times daily or as directed. 1 kit 0     Blood Glucose Monitoring Suppl (TRUE METRIX AIR GLUCOSE METER) W/DEVICE KIT USE AS DIRECTED 1 kit 0     buPROPion (WELLBUTRIN SR) 100 MG 12 hr tablet Take 1 tablet (100 mg) by mouth 2 times daily 180 tablet 3     Cholecalciferol (VITAMIN D) 2000 UNITS CAPS Take 2,000 Units by mouth daily        clotrimazole (LOTRIMIN) 1 % external cream Apply topically 2 times daily 45 g 1     DULoxetine (CYMBALTA) 60 MG capsule Take 1 capsule (60 mg) by mouth daily for depression 90 capsule 1     famotidine (PEPCID) 20 MG tablet TAKE 1 TABLET TWICE DAILY (REPLACES RANITIDINE) 180 tablet 3     fluticasone (FLONASE) 50 MCG/ACT nasal spray Spray 1 spray into both nostrils daily as needed        fluticasone-salmeterol (WIXELA INHUB) 100-50  MCG/DOSE inhaler INHALE 1 PUFF EVERY 12 HOURS 180 Inhaler 3     glimepiride (AMARYL) 2 MG tablet Take 1 tablet (2 mg) by mouth every morning (before breakfast) 90 tablet 3     IBANdronate (BONIVA) 150 MG tablet Take 1 tablet (150 mg) by mouth every 30 days 3 tablet 3     losartan (COZAAR) 50 MG tablet Take 1 tablet (50 mg) by mouth daily for Blood Pressure 90 tablet 3     Menthol, Topical Analgesic, (ICY HOT EX) Apply to affected area every morning       metoprolol succinate ER (TOPROL-XL) 100 MG 24 hr tablet Take 1 tablet (100 mg) by mouth daily 90 tablet 3     nystatin-triamcinolone (MYCOLOG II) 383121-9.1 UNIT/GM-% external cream APPLY TOPICALLY DAILY PRN 60 g 1     order for DME Equipment being ordered: diabetic shoes 1 each 0     order for DME DREAMSTATION  5-15 CM/H20  NASAL WISP FABRIC       oxyCODONE (ROXICODONE) 5 MG tablet Take 1 tablet (5 mg) by mouth 2 times daily as needed for pain 20 tablet 0     tiZANidine (ZANAFLEX) 2 MG tablet Take 1 tablet (2 mg) by mouth 3 times daily as needed for muscle spasms 20 tablet 1     triamcinolone (KENALOG) 0.1 % external cream Apply topically 2 times daily On rash for you foot 60 g 1     TRUE METRIX BLOOD GLUCOSE TEST test strip TEST BLOOD GLUCOSE EVERY  strip 1     TRUEPLUS LANCETS 28G MISC 1 each 2 times daily as needed 100 each 3     clopidogrel (PLAVIX) 75 MG tablet TAKE 1 TABLET EVERY DAY (Patient not taking: Reported on 11/11/2020) 90 tablet 0     neomycin-polymyxin-hydrocortisone (CORTISPORIN) otic solution Place 4 drops into both ears 4 times daily (Patient not taking: Reported on 10/19/2020) 10 mL 0     nitroGLYcerin (NITROSTAT) 0.4 MG sublingual tablet For chest pain place 1 tablet under the tongue every 5 minutes for 3 doses. If symptoms persist 5 minutes after 1st dose call 911. (Patient not taking: Reported on 11/11/2020) 25 tablet 0       ALLERGIES     Allergies   Allergen Reactions     Aspirin Hives     Reaction occurred during childhood.       Lisinopril Cough     Metformin      Elevated lactic acid     Minocycline      Yellow Dye Allergy. Minocycline has Yellow Dye #10.     Yellow Dye Hives     Rxn to yellow tablet. Eyes swelled shut.        PAST MEDICAL HISTORY:  Past Medical History:   Diagnosis Date     Aortic valve sclerosis     heart murmur, no AS     Arrhythmia     PAT, PVC     Aspirin allergy     Plavix use long term     Asthma      CKD (chronic kidney disease) stage 3, GFR 30-59 ml/min     x 2007 atleast     Congestive heart failure, unspecified      Depression      Diabetes mellitus (H) 2010     Diastolic dysfunction, left ventricle 2013    grade 2, nl ef     HTN (hypertension)      Lactic acidosis 08/2018    due to dehydration and metformin     Migraine headache      Mitral stenosis     mild, 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     Myocardial infarction (H) 9/2007, cath 2013 ml    BMS: stent to OM, diag, nl EF, echo /C angia 2013 , f/u cath no lesion >40%     Nephrolithiasis     right side     OA (osteoarthritis) of knee      Obesity      Rheumatoid arthritis flare (H)     prednisone     Sleep apnea     restarted using cpap 2017     TIA (transient ischaemic attack)      Ventral hernia, unspecified, without mention of obstruction or gangrene        PAST SURGICAL HISTORY:  Past Surgical History:   Procedure Laterality Date     APPENDECTOMY       BIOPSY BREAST      x2 -needle & lumpectomy-benign     CHOLECYSTECTOMY       CORONARY ANGIOGRAPHY ADULT ORDER  9/28/2007    Bare metal stent to OM1, Diagonal patent      CORONARY ANGIOGRAPHY ADULT ORDER  9/25/2007    Everson stent to Diagonal     HC LEFT HEART CATHETERIZATION  8/2013    Moderate CAD     HYSTERECTOMY TOTAL ABDOMINAL       ORTHOPEDIC SURGERY      knee replacement on right side (2006), Left side (2016)     RELEASE CARPAL TUNNEL      right and left     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 Father          at 8o's, ? prostate ca     Breast Cancer No family hx of      Cancer - colorectal No family hx of        SOCIAL HISTORY:  Social History     Socioeconomic History     Marital status:      Spouse name: None     Number of children: None     Years of education: None     Highest education level: None   Occupational History     None   Social Needs     Financial resource strain: None     Food insecurity     Worry: None     Inability: None     Transportation needs     Medical: None     Non-medical: None   Tobacco Use     Smoking status: Former Smoker     Packs/day: 0.25     Types: Cigarettes     Start date:      Quit date: 1973     Years since quittin.5     Smokeless tobacco: Never Used   Substance and Sexual Activity     Alcohol use: Yes     Alcohol/week: 0.0 - 1.0 standard drinks     Comment: rarely     Drug use: No     Sexual activity: Not Currently     Partners: Male   Lifestyle     Physical activity     Days per week: None     Minutes per session: None     Stress: None   Relationships     Social connections     Talks on phone: None     Gets together: None     Attends Judaism service: None     Active member of club or organization: None     Attends meetings of clubs or organizations: None     Relationship status: None     Intimate partner violence     Fear of current or ex partner: None     Emotionally abused: None     Physically abused: None     Forced sexual activity: None   Other Topics Concern     Parent/sibling w/ CABG, MI or angioplasty before 65F 55M? Not Asked      Service Not Asked     Blood Transfusions Not Asked     Caffeine Concern No     Occupational Exposure Not Asked     Hobby Hazards Not Asked     Sleep Concern No     Stress Concern No     Weight Concern No     Special Diet No     Back Care Not Asked     Exercise Yes     Comment: walking, swimming x2 a week     Bike Helmet Not Asked     Seat Belt Yes     Self-Exams Not Asked    Social History Narrative    , 1 step son    Work- clown for profession        Tobacco- none,smoked for couple months in 1970's    ETOH- occ 1/2 months    Diet coke- 2-3 cans/day    Exercise- swims 8 laps -3/week               Review of Systems:  Skin:  Negative       Eyes:  Positive for glasses    ENT:  Positive for nasal congestion;hearing loss    Respiratory:  Positive for shortness of breath;dyspnea on exertion;cough;wheezing;sleep apnea;CPAP asthma   Cardiovascular:  Negative      Gastroenterology: Negative      Genitourinary:  not assessed      Musculoskeletal:  Positive for joint pain hip pain  Neurologic:  Negative      Psychiatric:  Negative      Heme/Lymph/Imm:  Negative      Endocrine:  Positive for diabetes      Physical Exam:  Vitals: /68   Pulse 98   Wt 123.4 kg (272 lb)   SpO2 95%   BMI 42.60 kg/m      Constitutional:  cooperative, alert and oriented, well developed, well nourished, in no acute distress        Skin:  warm and dry to the touch, no apparent skin lesions or masses noted          Head:  normocephalic, no masses or lesions        Eyes:  pupils equal and round, conjunctivae and lids unremarkable, sclera white, no xanthalasma, EOMS intact, no nystagmus        Lymph:No Cervical lymphadenopathy present;No thyromegaly     ENT:  no pallor or cyanosis, dentition good        Neck:  carotid pulses are full and equal bilaterally, JVP normal, no carotid bruit   thick neck, difficult exam    Respiratory:  normal breath sounds, clear to auscultation, normal A-P diameter, normal symmetry, normal respiratory excursion, no use of accessory muscles         Cardiac: regular rhythm occasional premature beats     systolic ejection murmur;grade 2 early systolic murmur;grade 1   ?split s2 vs OS                                      DP pulses 2+--right radial artery site clean post angiogram    GI:  abdomen soft, non-tender, BS normoactive, no mass, no HSM, no bruits obese difficult exam due to  obesity, +bs    Extremities and Muscular Skeletal:  no deformities, clubbing, cyanosis, erythema observed   trace     hx lymphedema    Neurological:  no gross motor deficits   uses walker    Psych:  Alert and Oriented x 3      Recent Lab Results:  LIPID RESULTS:  Lab Results   Component Value Date    CHOL 123 08/24/2020    HDL 43 (L) 08/24/2020    LDL 40 08/24/2020    TRIG 201 (H) 08/24/2020    CHOLHDLRATIO 2.3 10/13/2015       LIVER ENZYME RESULTS:  Lab Results   Component Value Date    AST 12 08/24/2020    ALT 13 08/24/2020       CBC RESULTS:  Lab Results   Component Value Date    WBC 8.9 04/01/2019    RBC 4.68 04/01/2019    HGB 12.4 04/01/2019    HCT 39.8 04/01/2019    MCV 85 04/01/2019    MCH 26.5 04/01/2019    MCHC 31.2 (L) 04/01/2019    RDW 15.1 (H) 04/01/2019     04/01/2019       BMP RESULTS:  Lab Results   Component Value Date     08/24/2020    POTASSIUM 4.6 08/24/2020    CHLORIDE 107 08/24/2020    CO2 21 08/24/2020    ANIONGAP 9 08/24/2020     (H) 08/24/2020    BUN 21 08/24/2020    CR 1.50 (H) 08/24/2020    GFRESTIMATED 31 (L) 08/24/2020    GFRESTBLACK 36 (L) 08/24/2020    TELLY 9.1 08/24/2020        A1C RESULTS:  Lab Results   Component Value Date    A1C 6.2 (H) 08/24/2020       INR RESULTS:  Lab Results   Component Value Date    INR 0.93 07/19/2017    INR 0.99 09/21/2016       Thank you for allowing me to participate in the care of your patient.    Sincerely,     YOHANNES [facility]

## 2020-11-11 NOTE — PROGRESS NOTES
History of Present Illness:    Moira Andre is a 87 year old female followed here by Dr. Sinha. Thirteen years ago, she had acute coronary syndrome and had BMS to the OM-1 and diagonal vessels in a staged manner. Follow up angiogram in 2013 showed stents were open; another angiogram in 2017 for chest pain noting left main artery normal, LAD had a 10% narrowing proximally and a 20%-30% narrowing in the midportion.  The diagonal stent was widely patent.  The second diagonal had a 40% narrowing.  The left circumflex had a 50%-60% narrowing before the first obtuse marginal.  The ramus branch had a stent in it and it was 30% narrowed.  The right coronary artery had up to 25% narrowing.  Flow wire was checked across the circumflex lesions and it was in the normal range.  It was felt that that was a noncardiac problem.  That chest pain has not recurred.     She has known valvular disease, unclear if it is rheumatic. Last echo in 2018 showed hyperdynamic LVEF of over 70%. RV was midly enlarged, mild to moderate MS with mean gradient of 4-7, mild aortic stenosis with mean gradient of 13mmHg.    Repeat echo now  for exertional dyspnea.  -She tells me today that she thinks she has dyspnea as she is limited by her back/spinal stenosis  -severe MAC, mean gradient 7mmHg with heart rate of   -mean aortic valve gradient 10mmHg  EF 60-65%; IVC collapses; RVSP could not be approximated  This was reviewed with Dr. Sinha    EKG: sinus with first degree AV block  (I have verified with Dr. Simpson)    Creatinine is stable at 1.5  8-2020: HDL is low at 43; TG high at 201 with A1C 6.2%  LDL 40        Impression/Plan:     1. CAD as noted above  -no angina  -continue medical management    2. Valvular Heart disease  Mitral stenosis-stable  Aortic stenosis-stable  EF preserved  Recheck echo in 2 years      3. Dyslipidemia-controlled  -lipitor 20mg daily    4, CKD-stable  Baseline creatinine 1.37-1.5    It has been a pleasure  seeing Moira Andre in follow up. We will see her back in 2 years with an echo or sooner if need be.    Nickolas Mott, MSN, APRN-BC, CNP  Cardiology    No orders of the defined types were placed in this encounter.    No orders of the defined types were placed in this encounter.    There are no discontinued medications.      Encounter Diagnoses   Name Primary?     Aortic valve disorder      Mitral valve disease        CURRENT MEDICATIONS:  Current Outpatient Medications   Medication Sig Dispense Refill     acetaminophen (TYLENOL) 325 MG tablet Take 325-650 mg by mouth every 6 hours as needed for mild pain       albuterol (PROAIR HFA/PROVENTIL HFA/VENTOLIN HFA) 108 (90 Base) MCG/ACT inhaler Inhale 2 puffs into the lungs every 6 hours For shortness of breath 1 Inhaler 3     atorvastatin (LIPITOR) 20 MG tablet TAKE 1 TABLET EVERY DAY 90 tablet 2     blood glucose monitoring (NO BRAND SPECIFIED) meter device kit Use to test blood sugar 1-2 times daily or as directed. 1 kit 0     Blood Glucose Monitoring Suppl (TRUE METRIX AIR GLUCOSE METER) W/DEVICE KIT USE AS DIRECTED 1 kit 0     buPROPion (WELLBUTRIN SR) 100 MG 12 hr tablet Take 1 tablet (100 mg) by mouth 2 times daily 180 tablet 3     Cholecalciferol (VITAMIN D) 2000 UNITS CAPS Take 2,000 Units by mouth daily        clotrimazole (LOTRIMIN) 1 % external cream Apply topically 2 times daily 45 g 1     DULoxetine (CYMBALTA) 60 MG capsule Take 1 capsule (60 mg) by mouth daily for depression 90 capsule 1     famotidine (PEPCID) 20 MG tablet TAKE 1 TABLET TWICE DAILY (REPLACES RANITIDINE) 180 tablet 3     fluticasone (FLONASE) 50 MCG/ACT nasal spray Spray 1 spray into both nostrils daily as needed        fluticasone-salmeterol (WIXELA INHUB) 100-50 MCG/DOSE inhaler INHALE 1 PUFF EVERY 12 HOURS 180 Inhaler 3     glimepiride (AMARYL) 2 MG tablet Take 1 tablet (2 mg) by mouth every morning (before breakfast) 90 tablet 3     IBANdronate (BONIVA) 150 MG tablet Take 1  tablet (150 mg) by mouth every 30 days 3 tablet 3     losartan (COZAAR) 50 MG tablet Take 1 tablet (50 mg) by mouth daily for Blood Pressure 90 tablet 3     Menthol, Topical Analgesic, (ICY HOT EX) Apply to affected area every morning       metoprolol succinate ER (TOPROL-XL) 100 MG 24 hr tablet Take 1 tablet (100 mg) by mouth daily 90 tablet 3     nystatin-triamcinolone (MYCOLOG II) 336925-0.1 UNIT/GM-% external cream APPLY TOPICALLY DAILY PRN 60 g 1     order for DME Equipment being ordered: diabetic shoes 1 each 0     order for DME DREAMSTATION  5-15 CM/H20  NASAL [employer]       oxyCODONE (ROXICODONE) 5 MG tablet Take 1 tablet (5 mg) by mouth 2 times daily as needed for pain 20 tablet 0     tiZANidine (ZANAFLEX) 2 MG tablet Take 1 tablet (2 mg) by mouth 3 times daily as needed for muscle spasms 20 tablet 1     triamcinolone (KENALOG) 0.1 % external cream Apply topically 2 times daily On rash for you foot 60 g 1     TRUE METRIX BLOOD GLUCOSE TEST test strip TEST BLOOD GLUCOSE EVERY  strip 1     TRUEPLUS LANCETS 28G MISC 1 each 2 times daily as needed 100 each 3     clopidogrel (PLAVIX) 75 MG tablet TAKE 1 TABLET EVERY DAY (Patient not taking: Reported on 11/11/2020) 90 tablet 0     neomycin-polymyxin-hydrocortisone (CORTISPORIN) otic solution Place 4 drops into both ears 4 times daily (Patient not taking: Reported on 10/19/2020) 10 mL 0     nitroGLYcerin (NITROSTAT) 0.4 MG sublingual tablet For chest pain place 1 tablet under the tongue every 5 minutes for 3 doses. If symptoms persist 5 minutes after 1st dose call 911. (Patient not taking: Reported on 11/11/2020) 25 tablet 0       ALLERGIES     Allergies   Allergen Reactions     Aspirin Hives     Reaction occurred during childhood.      Lisinopril Cough     Metformin      Elevated lactic acid     Minocycline      Yellow Dye Allergy. Minocycline has Yellow Dye #10.     Yellow Dye Hives     Rxn to yellow tablet. Eyes swelled shut.        PAST MEDICAL  HISTORY:  Past Medical History:   Diagnosis Date     Aortic valve sclerosis     heart murmur, no AS     Arrhythmia     PAT, PVC     Aspirin allergy     Plavix use long term     Asthma      CKD (chronic kidney disease) stage 3, GFR 30-59 ml/min     x  atleast     Congestive heart failure, unspecified      Depression      Diabetes mellitus (H)      Diastolic dysfunction, left ventricle     grade 2, nl ef     HTN (hypertension)      Lactic acidosis 2018    due to dehydration and metformin     Migraine headache      Mitral stenosis     mild, 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     Myocardial infarction (H) 2007, cath 2013 ml    BMS: stent to OM, diag, nl EF, echo /C angia  , f/u cath no lesion >40%     Nephrolithiasis     right side     OA (osteoarthritis) of knee      Obesity      Rheumatoid arthritis flare (H)     prednisone     Sleep apnea     restarted using cpap      TIA (transient ischaemic attack)      Ventral hernia, unspecified, without mention of obstruction or gangrene        PAST SURGICAL HISTORY:  Past Surgical History:   Procedure Laterality Date     APPENDECTOMY       BIOPSY BREAST      x2 -needle & lumpectomy-benign     CHOLECYSTECTOMY       CORONARY ANGIOGRAPHY ADULT ORDER  2007    Bare metal stent to OM1, Diagonal patent      CORONARY ANGIOGRAPHY ADULT ORDER  2007    [place] stent to Diagonal     HC LEFT HEART CATHETERIZATION  2013    Moderate CAD     HYSTERECTOMY TOTAL ABDOMINAL       ORTHOPEDIC SURGERY      knee replacement on right side (), Left side ()     RELEASE CARPAL TUNNEL      right and left     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       FAMILY HISTORY:  Family History   Problem Relation Age of Onset     Neurologic Disorder Mother         MS - at 60's     C.A.D. Father          at 8o's, ? prostate ca     Breast Cancer No family hx of      Cancer - colorectal No family hx of        SOCIAL HISTORY:  Social History     Socioeconomic History      Marital status:      Spouse name: None     Number of children: None     Years of education: None     Highest education level: None   Occupational History     None   Social Needs     Financial resource strain: None     Food insecurity     Worry: None     Inability: None     Transportation needs     Medical: None     Non-medical: None   Tobacco Use     Smoking status: Former Smoker     Packs/day: 0.25     Types: Cigarettes     Start date:      Quit date: 1973     Years since quittin.5     Smokeless tobacco: Never Used   Substance and Sexual Activity     Alcohol use: Yes     Alcohol/week: 0.0 - 1.0 standard drinks     Comment: rarely     Drug use: No     Sexual activity: Not Currently     Partners: Male   Lifestyle     Physical activity     Days per week: None     Minutes per session: None     Stress: None   Relationships     Social connections     Talks on phone: None     Gets together: None     Attends Hindu service: None     Active member of club or organization: None     Attends meetings of clubs or organizations: None     Relationship status: None     Intimate partner violence     Fear of current or ex partner: None     Emotionally abused: None     Physically abused: None     Forced sexual activity: None   Other Topics Concern     Parent/sibling w/ CABG, MI or angioplasty before 65F 55M? Not Asked      Service Not Asked     Blood Transfusions Not Asked     Caffeine Concern No     Occupational Exposure Not Asked     Hobby Hazards Not Asked     Sleep Concern No     Stress Concern No     Weight Concern No     Special Diet No     Back Care Not Asked     Exercise Yes     Comment: walking, swimming x2 a week     Bike Helmet Not Asked     Seat Belt Yes     Self-Exams Not Asked   Social History Narrative    , 1 step son    Work- clown for profession        Tobacco- none,smoked for couple months in     [facility]- occ 1/2 months    Diet coke- 2-3 cans/day    Exercise- swims 8 laps  -3/week               Review of Systems:  Skin:  Negative       Eyes:  Positive for glasses    ENT:  Positive for nasal congestion;hearing loss    Respiratory:  Positive for shortness of breath;dyspnea on exertion;cough;wheezing;sleep apnea;CPAP asthma   Cardiovascular:  Negative      Gastroenterology: Negative      Genitourinary:  not assessed      Musculoskeletal:  Positive for joint pain hip pain  Neurologic:  Negative      Psychiatric:  Negative      Heme/Lymph/Imm:  Negative      Endocrine:  Positive for diabetes      Physical Exam:  Vitals: /68   Pulse 98   Wt 123.4 kg (272 lb)   SpO2 95%   BMI 42.60 kg/m      Constitutional:  cooperative, alert and oriented, well developed, well nourished, in no acute distress        Skin:  warm and dry to the touch, no apparent skin lesions or masses noted          Head:  normocephalic, no masses or lesions        Eyes:  pupils equal and round, conjunctivae and lids unremarkable, sclera white, no xanthalasma, EOMS intact, no nystagmus        Lymph:No Cervical lymphadenopathy present;No thyromegaly     ENT:  no pallor or cyanosis, dentition good        Neck:  carotid pulses are full and equal bilaterally, JVP normal, no carotid bruit   thick neck, difficult exam    Respiratory:  normal breath sounds, clear to auscultation, normal A-P diameter, normal symmetry, normal respiratory excursion, no use of accessory muscles         Cardiac: regular rhythm occasional premature beats     systolic ejection murmur;grade 2 early systolic murmur;grade 1   ?split s2 vs OS                                      DP pulses 2+--right radial artery site clean post angiogram    GI:  abdomen soft, non-tender, BS normoactive, no mass, no HSM, no bruits obese difficult exam due to obesity, +bs    Extremities and Muscular Skeletal:  no deformities, clubbing, cyanosis, erythema observed   trace     hx lymphedema    Neurological:  no gross motor deficits   uses walker    Psych:  Alert and  Oriented x 3      Recent Lab Results:  LIPID RESULTS:  Lab Results   Component Value Date    CHOL 123 08/24/2020    HDL 43 (L) 08/24/2020    LDL 40 08/24/2020    TRIG 201 (H) 08/24/2020    CHOLHDLRATIO 2.3 10/13/2015       LIVER ENZYME RESULTS:  Lab Results   Component Value Date    AST 12 08/24/2020    ALT 13 08/24/2020       CBC RESULTS:  Lab Results   Component Value Date    WBC 8.9 04/01/2019    RBC 4.68 04/01/2019    HGB 12.4 04/01/2019    HCT 39.8 04/01/2019    MCV 85 04/01/2019    MCH 26.5 04/01/2019    MCHC 31.2 (L) 04/01/2019    RDW 15.1 (H) 04/01/2019     04/01/2019       BMP RESULTS:  Lab Results   Component Value Date     08/24/2020    POTASSIUM 4.6 08/24/2020    CHLORIDE 107 08/24/2020    CO2 21 08/24/2020    ANIONGAP 9 08/24/2020     (H) 08/24/2020    BUN 21 08/24/2020    CR 1.50 (H) 08/24/2020    GFRESTIMATED 31 (L) 08/24/2020    GFRESTBLACK 36 (L) 08/24/2020    TELLY 9.1 08/24/2020        A1C RESULTS:  Lab Results   Component Value Date    A1C 6.2 (H) 08/24/2020       INR RESULTS:  Lab Results   Component Value Date    INR 0.93 07/19/2017    INR 0.99 09/21/2016           CC  Jozef Sinha MD  [id number]  [place] [id number]

## 2020-11-11 NOTE — PATIENT INSTRUCTIONS
I think your tests are stable  I will talk to Dr. Sinha and let you know if he agrees or wants any changes    I suspect he will want to see you in 2 years but I will check and let you know

## 2020-11-11 NOTE — LETTER
11/11/2020    Maryan Churchill MD  [address]  [place]                [place] [address]    RE: Moira Andre       Dear Colleague,    I had the pleasure of seeing Moira Andre in the [facility].    History of Present Illness:    Moira Andre is a 87 year old female followed here by Dr. Sinha. Thirteen years ago, she had acute coronary syndrome and had BMS to the OM-1 and diagonal vessels in a staged manner. Follow up angiogram in 2013 showed stents were open; another angiogram in 2017 for chest pain noting left main artery normal, LAD had a 10% narrowing proximally and a 20%-30% narrowing in the midportion.  The diagonal stent was widely patent.  The second diagonal had a 40% narrowing.  The left circumflex had a 50%-60% narrowing before the first obtuse marginal.  The ramus branch had a stent in it and it was 30% narrowed.  The right coronary artery had up to 25% narrowing.  Flow wire was checked across the circumflex lesions and it was in the normal range.  It was felt that that was a noncardiac problem.  That chest pain has not recurred.     She has known valvular disease, unclear if it is rheumatic. Last echo in 2018 showed hyperdynamic LVEF of over 70%. RV was midly enlarged, mild to moderate MS with mean gradient of 4-7, mild aortic stenosis with mean gradient of 13mmHg.    Repeat echo now  for exertional dyspnea.  -She tells me today that she thinks she has dyspnea as she is limited by her back/spinal stenosis  -severe MAC, mean gradient 7mmHg with heart rate of   -mean aortic valve gradient 10mmHg  EF 60-65%; IVC collapses; RVSP could not be approximated  This was reviewed with Dr. Sinha    EKG: sinus with first degree AV block  (I have verified with Dr. Sipmson)    Creatinine is stable at 1.5  8-2020: HDL is low at 43; TG high at 201 with A1C 6.2%  LDL 40        Impression/Plan:     1. CAD as noted above  -no angina  -continue medical management    2.  Valvular Heart disease  Mitral stenosis-stable  Aortic stenosis-stable  EF preserved  Recheck echo in 2 years      3. Dyslipidemia-controlled  -lipitor 20mg daily    4, CKD-stable  Baseline creatinine 1.37-1.5    It has been a pleasure seeing Moira Andre in follow up. We will see her back in 2 years with an echo or sooner if need be.    Nickolas Mott, MSN, APRN-BC, CNP  Cardiology    No orders of the defined types were placed in this encounter.    No orders of the defined types were placed in this encounter.    There are no discontinued medications.      Encounter Diagnoses   Name Primary?     Aortic valve disorder      Mitral valve disease        CURRENT MEDICATIONS:  Current Outpatient Medications   Medication Sig Dispense Refill     acetaminophen (TYLENOL) 325 MG tablet Take 325-650 mg by mouth every 6 hours as needed for mild pain       albuterol (PROAIR HFA/PROVENTIL HFA/VENTOLIN HFA) 108 (90 Base) MCG/ACT inhaler Inhale 2 puffs into the lungs every 6 hours For shortness of breath 1 Inhaler 3     atorvastatin (LIPITOR) 20 MG tablet TAKE 1 TABLET EVERY DAY 90 tablet 2     blood glucose monitoring (NO BRAND SPECIFIED) meter device kit Use to test blood sugar 1-2 times daily or as directed. 1 kit 0     Blood Glucose Monitoring Suppl (TRUE METRIX AIR GLUCOSE METER) W/DEVICE KIT USE AS DIRECTED 1 kit 0     buPROPion (WELLBUTRIN SR) 100 MG 12 hr tablet Take 1 tablet (100 mg) by mouth 2 times daily 180 tablet 3     Cholecalciferol (VITAMIN D) 2000 UNITS CAPS Take 2,000 Units by mouth daily        clotrimazole (LOTRIMIN) 1 % external cream Apply topically 2 times daily 45 g 1     DULoxetine (CYMBALTA) 60 MG capsule Take 1 capsule (60 mg) by mouth daily for depression 90 capsule 1     famotidine (PEPCID) 20 MG tablet TAKE 1 TABLET TWICE DAILY (REPLACES RANITIDINE) 180 tablet 3     fluticasone (FLONASE) 50 MCG/ACT nasal spray Spray 1 spray into both nostrils daily as needed        fluticasone-salmeterol (WIXELA  INHUB) 100-50 MCG/DOSE inhaler INHALE 1 PUFF EVERY 12 HOURS 180 Inhaler 3     glimepiride (AMARYL) 2 MG tablet Take 1 tablet (2 mg) by mouth every morning (before breakfast) 90 tablet 3     IBANdronate (BONIVA) 150 MG tablet Take 1 tablet (150 mg) by mouth every 30 days 3 tablet 3     losartan (COZAAR) 50 MG tablet Take 1 tablet (50 mg) by mouth daily for Blood Pressure 90 tablet 3     Menthol, Topical Analgesic, (ICY HOT EX) Apply to affected area every morning       metoprolol succinate ER (TOPROL-XL) 100 MG 24 hr tablet Take 1 tablet (100 mg) by mouth daily 90 tablet 3     nystatin-triamcinolone (MYCOLOG II) 420479-9.1 UNIT/GM-% external cream APPLY TOPICALLY DAILY PRN 60 g 1     order for DME Equipment being ordered: diabetic shoes 1 each 0     order for DME DREAMSTATION  5-15 CM/H20  NASAL WISP FABRIC       oxyCODONE (ROXICODONE) 5 MG tablet Take 1 tablet (5 mg) by mouth 2 times daily as needed for pain 20 tablet 0     tiZANidine (ZANAFLEX) 2 MG tablet Take 1 tablet (2 mg) by mouth 3 times daily as needed for muscle spasms 20 tablet 1     triamcinolone (KENALOG) 0.1 % external cream Apply topically 2 times daily On rash for you foot 60 g 1     TRUE METRIX BLOOD GLUCOSE TEST test strip TEST BLOOD GLUCOSE EVERY  strip 1     TRUEPLUS LANCETS 28G MISC 1 each 2 times daily as needed 100 each 3     clopidogrel (PLAVIX) 75 MG tablet TAKE 1 TABLET EVERY DAY (Patient not taking: Reported on 11/11/2020) 90 tablet 0     neomycin-polymyxin-hydrocortisone (CORTISPORIN) otic solution Place 4 drops into both ears 4 times daily (Patient not taking: Reported on 10/19/2020) 10 mL 0     nitroGLYcerin (NITROSTAT) 0.4 MG sublingual tablet For chest pain place 1 tablet under the tongue every 5 minutes for 3 doses. If symptoms persist 5 minutes after 1st dose call 911. (Patient not taking: Reported on 11/11/2020) 25 tablet 0       ALLERGIES     Allergies   Allergen Reactions     Aspirin Hives     Reaction occurred during  childhood.      Lisinopril Cough     Metformin      Elevated lactic acid     Minocycline      Yellow Dye Allergy. Minocycline has Yellow Dye #10.     Yellow Dye Hives     Rxn to yellow tablet. Eyes swelled shut.        PAST MEDICAL HISTORY:  Past Medical History:   Diagnosis Date     Aortic valve sclerosis     heart murmur, no AS     Arrhythmia     PAT, PVC     Aspirin allergy     Plavix use long term     Asthma      CKD (chronic kidney disease) stage 3, GFR 30-59 ml/min     x 2007 atleast     Congestive heart failure, unspecified      Depression      Diabetes mellitus (H) 2010     Diastolic dysfunction, left ventricle 2013    grade 2, nl ef     HTN (hypertension)      Lactic acidosis 08/2018    due to dehydration and metformin     Migraine headache      Mitral stenosis     mild, 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     Myocardial infarction (H) 9/2007, cath 2013 ml    BMS: stent to OM, diag, nl EF, echo /C angia 2013 , f/u cath no lesion >40%     Nephrolithiasis     right side     OA (osteoarthritis) of knee      Obesity      Rheumatoid arthritis flare (H)     prednisone     Sleep apnea     restarted using cpap 2017     TIA (transient ischaemic attack)      Ventral hernia, unspecified, without mention of obstruction or gangrene        PAST SURGICAL HISTORY:  Past Surgical History:   Procedure Laterality Date     APPENDECTOMY       BIOPSY BREAST      x2 -needle & lumpectomy-benign     CHOLECYSTECTOMY       CORONARY ANGIOGRAPHY ADULT ORDER  9/28/2007    Bare metal stent to OM1, Diagonal patent      CORONARY ANGIOGRAPHY ADULT ORDER  9/25/2007    [place] stent to Diagonal     HC LEFT HEART CATHETERIZATION  8/2013    Moderate CAD     HYSTERECTOMY TOTAL ABDOMINAL       ORTHOPEDIC SURGERY      knee replacement on right side (2006), Left side (2016)     RELEASE CARPAL TUNNEL      right and left     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 Father          at 8o's, ? prostate ca     Breast Cancer No family hx of      Cancer - colorectal No family hx of        SOCIAL HISTORY:  Social History     Socioeconomic History     Marital status:      Spouse name: None     Number of children: None     Years of education: None     Highest education level: None   Occupational History     None   Social Needs     Financial resource strain: None     Food insecurity     Worry: None     Inability: None     Transportation needs     Medical: None     Non-medical: None   Tobacco Use     Smoking status: Former Smoker     Packs/day: 0.25     Types: Cigarettes     Start date:      Quit date: 1973     Years since quittin.5     Smokeless tobacco: Never Used   Substance and Sexual Activity     Alcohol use: Yes     Alcohol/week: 0.0 - 1.0 standard drinks     Comment: rarely     Drug use: No     Sexual activity: Not Currently     Partners: Male   Lifestyle     Physical activity     Days per week: None     Minutes per session: None     Stress: None   Relationships     Social connections     Talks on phone: None     Gets together: None     Attends Anabaptist service: None     Active member of club or organization: None     Attends meetings of clubs or organizations: None     Relationship status: None     Intimate partner violence     Fear of current or ex partner: None     Emotionally abused: None     Physically abused: None     Forced sexual activity: None   Other Topics Concern     Parent/sibling w/ CABG, MI or angioplasty before 65F 55M? Not Asked      Service Not Asked     Blood Transfusions Not Asked     Caffeine Concern No     Occupational Exposure Not Asked     Hobby Hazards Not Asked     Sleep Concern No     Stress Concern No     Weight Concern No     Special Diet No     Back Care Not Asked     Exercise Yes     Comment: walking, swimming x2 a week     Bike Helmet Not Asked     Seat Belt Yes      Self-Exams Not Asked   Social History Narrative    , 1 step son    Work- clown for profession        Tobacco- none,smoked for couple months in 1970's    ETOH- occ 1/2 months    Diet coke- 2-3 cans/day    Exercise- swims 8 laps -3/week               Review of Systems:  Skin:  Negative       Eyes:  Positive for glasses    ENT:  Positive for nasal congestion;hearing loss    Respiratory:  Positive for shortness of breath;dyspnea on exertion;cough;wheezing;sleep apnea;CPAP asthma   Cardiovascular:  Negative      Gastroenterology: Negative      Genitourinary:  not assessed      Musculoskeletal:  Positive for joint pain hip pain  Neurologic:  Negative      Psychiatric:  Negative      Heme/Lymph/Imm:  Negative      Endocrine:  Positive for diabetes      Physical Exam:  Vitals: /68   Pulse 98   Wt 123.4 kg (272 lb)   SpO2 95%   BMI 42.60 kg/m      Constitutional:  cooperative, alert and oriented, well developed, well nourished, in no acute distress        Skin:  warm and dry to the touch, no apparent skin lesions or masses noted          Head:  normocephalic, no masses or lesions        Eyes:  pupils equal and round, conjunctivae and lids unremarkable, sclera white, no xanthalasma, EOMS intact, no nystagmus        Lymph:No Cervical lymphadenopathy present;No thyromegaly     ENT:  no pallor or cyanosis, dentition good        Neck:  carotid pulses are full and equal bilaterally, JVP normal, no carotid bruit   thick neck, difficult exam    Respiratory:  normal breath sounds, clear to auscultation, normal A-P diameter, normal symmetry, normal respiratory excursion, no use of accessory muscles         Cardiac: regular rhythm occasional premature beats     systolic ejection murmur;grade 2 early systolic murmur;grade 1   ?split s2 vs OS                                      DP pulses 2+--right radial artery site clean post angiogram    GI:  abdomen soft, non-tender, BS normoactive, no mass, no HSM, no bruits  obese difficult exam due to obesity, +bs    Extremities and Muscular Skeletal:  no deformities, clubbing, cyanosis, erythema observed   trace     hx lymphedema    Neurological:  no gross motor deficits   uses walker    Psych:  Alert and Oriented x 3      Recent Lab Results:  LIPID RESULTS:  Lab Results   Component Value Date    CHOL 123 08/24/2020    HDL 43 (L) 08/24/2020    LDL 40 08/24/2020    TRIG 201 (H) 08/24/2020    CHOLHDLRATIO 2.3 10/13/2015       LIVER ENZYME RESULTS:  Lab Results   Component Value Date    AST 12 08/24/2020    ALT 13 08/24/2020       CBC RESULTS:  Lab Results   Component Value Date    WBC 8.9 04/01/2019    RBC 4.68 04/01/2019    HGB 12.4 04/01/2019    HCT 39.8 04/01/2019    MCV 85 04/01/2019    MCH 26.5 04/01/2019    MCHC 31.2 (L) 04/01/2019    RDW 15.1 (H) 04/01/2019     04/01/2019       BMP RESULTS:  Lab Results   Component Value Date     08/24/2020    POTASSIUM 4.6 08/24/2020    CHLORIDE 107 08/24/2020    CO2 21 08/24/2020    ANIONGAP 9 08/24/2020     (H) 08/24/2020    BUN 21 08/24/2020    CR 1.50 (H) 08/24/2020    GFRESTIMATED 31 (L) 08/24/2020    GFRESTBLACK 36 (L) 08/24/2020    TELLY 9.1 08/24/2020        A1C RESULTS:  Lab Results   Component Value Date    A1C 6.2 (H) 08/24/2020       INR RESULTS:  Lab Results   Component Value Date    INR 0.93 07/19/2017    INR 0.99 09/21/2016           CC  Jozef Sinha MD  [address]  [place] [address]                    Thank you for allowing me to participate in the care of your patient.      Sincerely,     [facility]ANN[facility]    cc:   Jozef Sinha MD  [address]  [place] [address]

## 2020-12-13 ENCOUNTER — NURSE TRIAGE (OUTPATIENT)
Dept: NURSING | Facility: CLINIC | Age: 85
End: 2020-12-13

## 2020-12-13 DIAGNOSIS — M25.611 SHOULDER JOINT STIFFNESS, BILATERAL: ICD-10-CM

## 2020-12-13 DIAGNOSIS — M25.551 HIP PAIN, RIGHT: ICD-10-CM

## 2020-12-13 DIAGNOSIS — M25.612 SHOULDER JOINT STIFFNESS, BILATERAL: ICD-10-CM

## 2020-12-13 DIAGNOSIS — M43.6 STIFF NECK: ICD-10-CM

## 2020-12-13 DIAGNOSIS — M62.838 MUSCLE SPASM: Primary | ICD-10-CM

## 2020-12-13 NOTE — TELEPHONE ENCOUNTER
Clinic Action Needed: yes, refill request  [facility] Triage Call  Presenting Problem:    Moira calls to request a refill of her medications:     Disp Refills Start End PRO   oxyCODONE (ROXICODONE) 5 MG tablet 20 tablet 0 8/24/2020  No   Sig - Route: Take 1 tablet (5 mg) by mouth 2 times daily as needed for pain - Oral      Disp Refills Start End PRO   tiZANidine (ZANAFLEX) 2 MG tablet 20 tablet 1 8/24/2020  No   Sig - Route: Take 1 tablet (2 mg) by mouth 3 times daily as needed for muscle spasms - Oral     Oxycodone - has 4 tabs left.    States that she had a fall yesterday and ended up calling 911 as she has on the floor for about 1.5 hours. Landed on her hips/bottom/ Feels fine now, able to stand and walk on her own. Able to shower yesterday.    Pharmacy: [id number] [place], [place] - [address] AT [address] [place] [address]      Routed to: ALONSO MCCLURE [[id number]]    Please be sure to close this encounter once this patient's issue/question has been addressed.    Elena Peterson RN/[facility] Nurse Advisor      Reason for Disposition    Caller requesting a NON-URGENT new prescription or refill and triager unable to refill per unit policy    Protocols used: MEDICATION QUESTION CALL-A-

## 2020-12-14 RX ORDER — OXYCODONE HYDROCHLORIDE 5 MG/1
5 TABLET ORAL 2 TIMES DAILY PRN
Qty: 20 TABLET | Refills: 0 | Status: ON HOLD | OUTPATIENT
Start: 2020-12-14 | End: 2021-02-19

## 2020-12-14 RX ORDER — TIZANIDINE 2 MG/1
2 TABLET ORAL 3 TIMES DAILY PRN
Qty: 20 TABLET | Refills: 1 | Status: SHIPPED | OUTPATIENT
Start: 2020-12-14 | End: 2021-08-27

## 2020-12-14 NOTE — TELEPHONE ENCOUNTER
Controlled Substance Refill Request for oxyCODONE (ROXICODONE) 5 MG tablet    Problem List Complete:    No     PROVIDER TO CONSIDER COMPLETION OF PROBLEM LIST AND OVERVIEW/CONTROLLED SUBSTANCE AGREEMENT    Last Written Prescription Date:  08/24/20  Last Fill Quantity: 20,   # refills: 0    THE MOST RECENT OFFICE VISIT MUST BE WITHIN THE PAST 3 MONTHS. AT LEAST ONE FACE TO FACE VISIT MUST OCCUR EVERY 6 MONTHS. ADDITIONAL VISITS CAN BE VIRTUAL.  (THIS STATEMENT SHOULD BE DELETED.)    Last Office Visit with [facility] primary care provider: Kerline LARIOS 08/24/20    Future Office visit:     Controlled substance agreement:   Encounter-Level CSA - 06/28/2016:    Controlled Substance Agreement - Scan on 7/1/2016  3:22 PM: CONTROLLED SUBSTANCE AGREEMENT     Patient-Level CSA:    There are no patient-level csa.         Last Urine Drug Screen: No results found for: CDAUT, No results found for: COMDAT,   Cannabinoids (78-nnu-6-carboxy-9-THC)   Date Value Ref Range Status   06/19/2019 Not Detected NDET^Not Detected ng/mL Final     Comment:     Cutoff for a negative cannabinoid is 50 ng/mL or less.     Phencyclidine (Phencyclidine)   Date Value Ref Range Status   06/19/2019 Not Detected NDET^Not Detected ng/mL Final     Comment:     Cutoff for a negative PCP is 25 ng/mL or less.     Cocaine (Benzoylecgonine)   Date Value Ref Range Status   06/19/2019 Not Detected NDET^Not Detected ng/mL Final     Comment:     Cutoff for a negative cocaine is 150 ng/ml or less.     Methamphetamine (d-Methamphetamine)   Date Value Ref Range Status   06/19/2019 Not Detected NDET^Not Detected ng/mL Final     Comment:     Cutoff for a negative methamphetamine is 500 ng/ml or less.     Opiates (Morphine)   Date Value Ref Range Status   06/19/2019 Not Detected NDET^Not Detected ng/mL Final     Comment:     Cutoff for a negative opiate is 100 ng/ml or less.     Amphetamine (d-Amphetamine)   Date Value Ref Range Status   06/19/2019 Not Detected NDET^Not Detected  ng/mL Final     Comment:     Cutoff for a negative amphetamine is 500 ng/mL or less.     Benzodiazepines (Nordiazepam)   Date Value Ref Range Status   06/19/2019 Not Detected NDET^Not Detected ng/mL Final     Comment:     Cutoff for a negative benzodiazepine is 150 ng/ml or less.     Tricyclic Antidepressants (Desipramine)   Date Value Ref Range Status   06/19/2019 Not Detected NDET^Not Detected ng/mL Final     Comment:     Cutoff for a negative tricyclic antidepressant is 300 ng/ml or less.     Methadone (Methadone)   Date Value Ref Range Status   06/19/2019 Not Detected NDET^Not Detected ng/mL Final     Comment:     Cutoff for a negative methadone is 200 ng/ml or less.     Barbiturates (Butalbital)   Date Value Ref Range Status   06/19/2019 Not Detected NDET^Not Detected ng/mL Final     Comment:     Cutoff for a negative barbituate is 200 ng/ml or less.     Oxycodone (Oxycodone)   Date Value Ref Range Status   06/19/2019 Not Detected NDET^Not Detected ng/mL Final     Comment:     Cutoff for a negative Oxycodone is 100 ng/mL or less.     Propoxyphene (Norpropoxyphene)   Date Value Ref Range Status   06/19/2019 Not Detected NDET^Not Detected ng/mL Final     Comment:     Cutoff for a negative propoxyphene is 300 ng/ml or less     Buprenorphine (Buprenorphine)   Date Value Ref Range Status   06/19/2019 Not Detected NDET^Not Detected ng/mL Final     Comment:     Cutoff for a negative buprenorphine is 10 ng/ml or less        Processing:  Rx to be electronically transmitted to pharmacy by provider      [email or web address]       checked in past 3 months?  No, route to RN    RN not a delegate to check  for this provider

## 2021-01-12 DIAGNOSIS — E11.21 TYPE 2 DIABETES MELLITUS WITH DIABETIC NEPHROPATHY, WITHOUT LONG-TERM CURRENT USE OF INSULIN (H): ICD-10-CM

## 2021-01-14 RX ORDER — CALCIUM CITRATE/VITAMIN D3 200MG-6.25
TABLET ORAL
Qty: 100 STRIP | Refills: 1 | Status: SHIPPED | OUTPATIENT
Start: 2021-01-14 | End: 2021-11-26

## 2021-01-18 ENCOUNTER — TRANSFERRED RECORDS (OUTPATIENT)
Dept: HEALTH INFORMATION MANAGEMENT | Facility: CLINIC | Age: 86
End: 2021-01-18

## 2021-02-17 ENCOUNTER — HOSPITAL ENCOUNTER (OUTPATIENT)
Facility: CLINIC | Age: 86
Setting detail: OBSERVATION
Discharge: NURSING, REHAB OR LONG-TERM CARE | End: 2021-02-21
Attending: EMERGENCY MEDICINE | Admitting: INTERNAL MEDICINE
Payer: COMMERCIAL

## 2021-02-17 DIAGNOSIS — M54.16 LUMBAR RADICULOPATHY: ICD-10-CM

## 2021-02-17 DIAGNOSIS — M62.830 BACK MUSCLE SPASM: ICD-10-CM

## 2021-02-17 DIAGNOSIS — I10 ESSENTIAL HYPERTENSION: Primary | ICD-10-CM

## 2021-02-17 LAB
ANION GAP SERPL CALCULATED.3IONS-SCNC: 7 MMOL/L (ref 3–14)
BUN SERPL-MCNC: 38 MG/DL (ref 7–30)
CALCIUM SERPL-MCNC: 9.4 MG/DL (ref 8.5–10.1)
CHLORIDE SERPL-SCNC: 106 MMOL/L (ref 94–109)
CO2 SERPL-SCNC: 24 MMOL/L (ref 20–32)
CREAT SERPL-MCNC: 1.69 MG/DL (ref 0.52–1.04)
ERYTHROCYTE [DISTWIDTH] IN BLOOD BY AUTOMATED COUNT: 14.5 % (ref 10–15)
GFR SERPL CREATININE-BSD FRML MDRD: 27 ML/MIN/{1.73_M2}
GLUCOSE SERPL-MCNC: 82 MG/DL (ref 70–99)
HCT VFR BLD AUTO: 43.5 % (ref 35–47)
HGB BLD-MCNC: 13.1 G/DL (ref 11.7–15.7)
LABORATORY COMMENT REPORT: NORMAL
MCH RBC QN AUTO: 25.7 PG (ref 26.5–33)
MCHC RBC AUTO-ENTMCNC: 30.1 G/DL (ref 31.5–36.5)
MCV RBC AUTO: 86 FL (ref 78–100)
PLATELET # BLD AUTO: 375 10E9/L (ref 150–450)
POTASSIUM SERPL-SCNC: 4.1 MMOL/L (ref 3.4–5.3)
RBC # BLD AUTO: 5.09 10E12/L (ref 3.8–5.2)
SARS-COV-2 RNA RESP QL NAA+PROBE: NEGATIVE
SODIUM SERPL-SCNC: 137 MMOL/L (ref 133–144)
SPECIMEN SOURCE: NORMAL
WBC # BLD AUTO: 12.4 10E9/L (ref 4–11)

## 2021-02-17 PROCEDURE — 96375 TX/PRO/DX INJ NEW DRUG ADDON: CPT

## 2021-02-17 PROCEDURE — 96374 THER/PROPH/DIAG INJ IV PUSH: CPT

## 2021-02-17 PROCEDURE — 99285 EMERGENCY DEPT VISIT HI MDM: CPT | Mod: 25

## 2021-02-17 PROCEDURE — 258N000003 HC RX IP 258 OP 636: Performed by: INTERNAL MEDICINE

## 2021-02-17 PROCEDURE — 99220 PR INITIAL OBSERVATION CARE,LEVEL III: CPT | Performed by: INTERNAL MEDICINE

## 2021-02-17 PROCEDURE — 87635 SARS-COV-2 COVID-19 AMP PRB: CPT | Performed by: EMERGENCY MEDICINE

## 2021-02-17 PROCEDURE — 80048 BASIC METABOLIC PNL TOTAL CA: CPT | Performed by: EMERGENCY MEDICINE

## 2021-02-17 PROCEDURE — G0378 HOSPITAL OBSERVATION PER HR: HCPCS

## 2021-02-17 PROCEDURE — 85027 COMPLETE CBC AUTOMATED: CPT | Performed by: EMERGENCY MEDICINE

## 2021-02-17 PROCEDURE — C9803 HOPD COVID-19 SPEC COLLECT: HCPCS

## 2021-02-17 PROCEDURE — 250N000013 HC RX MED GY IP 250 OP 250 PS 637: Performed by: INTERNAL MEDICINE

## 2021-02-17 PROCEDURE — 250N000011 HC RX IP 250 OP 636: Performed by: EMERGENCY MEDICINE

## 2021-02-17 PROCEDURE — 250N000013 HC RX MED GY IP 250 OP 250 PS 637: Performed by: EMERGENCY MEDICINE

## 2021-02-17 RX ORDER — DIAZEPAM 10 MG/2ML
2.5 INJECTION, SOLUTION INTRAMUSCULAR; INTRAVENOUS ONCE
Status: COMPLETED | OUTPATIENT
Start: 2021-02-17 | End: 2021-02-17

## 2021-02-17 RX ORDER — DULOXETIN HYDROCHLORIDE 30 MG/1
60 CAPSULE, DELAYED RELEASE ORAL EVERY EVENING
Status: DISCONTINUED | OUTPATIENT
Start: 2021-02-17 | End: 2021-02-21 | Stop reason: HOSPADM

## 2021-02-17 RX ORDER — ONDANSETRON 2 MG/ML
4 INJECTION INTRAMUSCULAR; INTRAVENOUS EVERY 6 HOURS PRN
Status: DISCONTINUED | OUTPATIENT
Start: 2021-02-17 | End: 2021-02-21 | Stop reason: HOSPADM

## 2021-02-17 RX ORDER — METOPROLOL SUCCINATE 100 MG/1
100 TABLET, EXTENDED RELEASE ORAL EVERY EVENING
Status: DISCONTINUED | OUTPATIENT
Start: 2021-02-17 | End: 2021-02-21 | Stop reason: HOSPADM

## 2021-02-17 RX ORDER — NALOXONE HYDROCHLORIDE 0.4 MG/ML
0.4 INJECTION, SOLUTION INTRAMUSCULAR; INTRAVENOUS; SUBCUTANEOUS
Status: DISCONTINUED | OUTPATIENT
Start: 2021-02-17 | End: 2021-02-21 | Stop reason: HOSPADM

## 2021-02-17 RX ORDER — KETAMINE HCL IN NACL, ISO-OSM 100MG/10ML
10 SYRINGE (ML) INJECTION EVERY 4 HOURS PRN
Status: DISCONTINUED | OUTPATIENT
Start: 2021-02-17 | End: 2021-02-20

## 2021-02-17 RX ORDER — GLIMEPIRIDE 2 MG/1
2 TABLET ORAL
Status: DISCONTINUED | OUTPATIENT
Start: 2021-02-18 | End: 2021-02-21 | Stop reason: HOSPADM

## 2021-02-17 RX ORDER — SODIUM CHLORIDE 9 MG/ML
INJECTION, SOLUTION INTRAVENOUS CONTINUOUS
Status: DISCONTINUED | OUTPATIENT
Start: 2021-02-17 | End: 2021-02-18

## 2021-02-17 RX ORDER — NITROGLYCERIN 0.4 MG/1
0.4 TABLET SUBLINGUAL EVERY 5 MIN PRN
Status: DISCONTINUED | OUTPATIENT
Start: 2021-02-17 | End: 2021-02-21 | Stop reason: HOSPADM

## 2021-02-17 RX ORDER — NALOXONE HYDROCHLORIDE 0.4 MG/ML
0.2 INJECTION, SOLUTION INTRAMUSCULAR; INTRAVENOUS; SUBCUTANEOUS
Status: DISCONTINUED | OUTPATIENT
Start: 2021-02-17 | End: 2021-02-21 | Stop reason: HOSPADM

## 2021-02-17 RX ORDER — NICOTINE POLACRILEX 4 MG
15-30 LOZENGE BUCCAL
Status: DISCONTINUED | OUTPATIENT
Start: 2021-02-17 | End: 2021-02-21 | Stop reason: HOSPADM

## 2021-02-17 RX ORDER — ONDANSETRON 4 MG/1
4 TABLET, ORALLY DISINTEGRATING ORAL EVERY 6 HOURS PRN
Status: DISCONTINUED | OUTPATIENT
Start: 2021-02-17 | End: 2021-02-21 | Stop reason: HOSPADM

## 2021-02-17 RX ORDER — AMOXICILLIN 250 MG
2 CAPSULE ORAL 2 TIMES DAILY PRN
Status: DISCONTINUED | OUTPATIENT
Start: 2021-02-17 | End: 2021-02-20

## 2021-02-17 RX ORDER — LIDOCAINE 4 G/G
1 PATCH TOPICAL ONCE
Status: COMPLETED | OUTPATIENT
Start: 2021-02-17 | End: 2021-02-18

## 2021-02-17 RX ORDER — AMOXICILLIN 250 MG
1 CAPSULE ORAL 2 TIMES DAILY PRN
Status: DISCONTINUED | OUTPATIENT
Start: 2021-02-17 | End: 2021-02-20

## 2021-02-17 RX ORDER — HYDROMORPHONE HYDROCHLORIDE 1 MG/ML
.3-.5 INJECTION, SOLUTION INTRAMUSCULAR; INTRAVENOUS; SUBCUTANEOUS
Status: DISCONTINUED | OUTPATIENT
Start: 2021-02-17 | End: 2021-02-18

## 2021-02-17 RX ORDER — ACETAMINOPHEN 325 MG/1
650 TABLET ORAL 2 TIMES DAILY
Status: ON HOLD | COMMUNITY
End: 2021-02-19

## 2021-02-17 RX ORDER — TIZANIDINE 2 MG/1
2 TABLET ORAL 3 TIMES DAILY PRN
Status: DISCONTINUED | OUTPATIENT
Start: 2021-02-17 | End: 2021-02-21 | Stop reason: HOSPADM

## 2021-02-17 RX ORDER — FAMOTIDINE 20 MG/1
20 TABLET, FILM COATED ORAL EVERY EVENING
Status: DISCONTINUED | OUTPATIENT
Start: 2021-02-17 | End: 2021-02-21 | Stop reason: HOSPADM

## 2021-02-17 RX ORDER — BUPROPION HYDROCHLORIDE 100 MG/1
100 TABLET, EXTENDED RELEASE ORAL EVERY EVENING
Status: DISCONTINUED | OUTPATIENT
Start: 2021-02-17 | End: 2021-02-21 | Stop reason: HOSPADM

## 2021-02-17 RX ORDER — DEXTROSE MONOHYDRATE 25 G/50ML
25-50 INJECTION, SOLUTION INTRAVENOUS
Status: DISCONTINUED | OUTPATIENT
Start: 2021-02-17 | End: 2021-02-21 | Stop reason: HOSPADM

## 2021-02-17 RX ORDER — LIDOCAINE 4 G/G
1 PATCH TOPICAL
Status: DISCONTINUED | OUTPATIENT
Start: 2021-02-17 | End: 2021-02-21 | Stop reason: HOSPADM

## 2021-02-17 RX ORDER — FENTANYL CITRATE 50 UG/ML
25 INJECTION, SOLUTION INTRAMUSCULAR; INTRAVENOUS ONCE
Status: COMPLETED | OUTPATIENT
Start: 2021-02-17 | End: 2021-02-17

## 2021-02-17 RX ORDER — OXYCODONE HYDROCHLORIDE 5 MG/1
10 TABLET ORAL ONCE
Status: COMPLETED | OUTPATIENT
Start: 2021-02-17 | End: 2021-02-17

## 2021-02-17 RX ORDER — CLOPIDOGREL BISULFATE 75 MG/1
75 TABLET ORAL EVERY EVENING
Status: DISCONTINUED | OUTPATIENT
Start: 2021-02-17 | End: 2021-02-21 | Stop reason: HOSPADM

## 2021-02-17 RX ORDER — OXYCODONE HYDROCHLORIDE 5 MG/1
5-10 TABLET ORAL
Status: DISCONTINUED | OUTPATIENT
Start: 2021-02-17 | End: 2021-02-20

## 2021-02-17 RX ORDER — OXYCODONE HYDROCHLORIDE 5 MG/1
5 TABLET ORAL ONCE
Status: COMPLETED | OUTPATIENT
Start: 2021-02-17 | End: 2021-02-17

## 2021-02-17 RX ORDER — ALBUTEROL SULFATE 90 UG/1
2 AEROSOL, METERED RESPIRATORY (INHALATION) EVERY 6 HOURS
Status: DISCONTINUED | OUTPATIENT
Start: 2021-02-18 | End: 2021-02-21 | Stop reason: HOSPADM

## 2021-02-17 RX ORDER — HYDROMORPHONE HYDROCHLORIDE 1 MG/ML
0.2 INJECTION, SOLUTION INTRAMUSCULAR; INTRAVENOUS; SUBCUTANEOUS ONCE
Status: COMPLETED | OUTPATIENT
Start: 2021-02-17 | End: 2021-02-17

## 2021-02-17 RX ADMIN — HYDROMORPHONE HYDROCHLORIDE 0.2 MG: 1 INJECTION, SOLUTION INTRAMUSCULAR; INTRAVENOUS; SUBCUTANEOUS at 22:31

## 2021-02-17 RX ADMIN — BUPROPION HYDROCHLORIDE 100 MG: 100 TABLET, FILM COATED, EXTENDED RELEASE ORAL at 23:46

## 2021-02-17 RX ADMIN — CLOPIDOGREL BISULFATE 75 MG: 75 TABLET ORAL at 23:46

## 2021-02-17 RX ADMIN — METOPROLOL SUCCINATE 100 MG: 100 TABLET, EXTENDED RELEASE ORAL at 23:46

## 2021-02-17 RX ADMIN — DIAZEPAM 2.5 MG: 5 INJECTION, SOLUTION INTRAMUSCULAR; INTRAVENOUS at 19:04

## 2021-02-17 RX ADMIN — OXYCODONE HYDROCHLORIDE 5 MG: 5 TABLET ORAL at 18:09

## 2021-02-17 RX ADMIN — FAMOTIDINE 20 MG: 20 TABLET ORAL at 23:46

## 2021-02-17 RX ADMIN — FENTANYL CITRATE 25 MCG: 50 INJECTION, SOLUTION INTRAMUSCULAR; INTRAVENOUS at 19:04

## 2021-02-17 RX ADMIN — ALBUTEROL SULFATE 2 PUFF: 90 AEROSOL, METERED RESPIRATORY (INHALATION) at 23:46

## 2021-02-17 RX ADMIN — DULOXETINE HYDROCHLORIDE 60 MG: 30 CAPSULE, DELAYED RELEASE ORAL at 23:46

## 2021-02-17 RX ADMIN — OXYCODONE HYDROCHLORIDE 5 MG: 5 TABLET ORAL at 18:02

## 2021-02-17 RX ADMIN — LIDOCAINE 1 PATCH: 560 PATCH PERCUTANEOUS; TOPICAL; TRANSDERMAL at 23:53

## 2021-02-17 RX ADMIN — LIDOCAINE 1 PATCH: 560 PATCH PERCUTANEOUS; TOPICAL; TRANSDERMAL at 22:32

## 2021-02-17 RX ADMIN — SODIUM CHLORIDE: 9 INJECTION, SOLUTION INTRAVENOUS at 23:55

## 2021-02-17 ASSESSMENT — ENCOUNTER SYMPTOMS
NUMBNESS: 0
MYALGIAS: 1
BACK PAIN: 1

## 2021-02-17 ASSESSMENT — MIFFLIN-ST. JEOR: SCORE: 1742.24

## 2021-02-17 NOTE — ED PROVIDER NOTES
"  History   Chief Complaint:  Spasms     HPI   Moira Andre is a 87 year old female with history of spinal stenosis with weekly injections who presents with muscle spasms. The patient reports yesterday she began experiencing spasms down her right hip and throughout the right leg. She has been taking half an Oxycodone tablet a couple times a day. She also took a Zanaflex muscle relaxer about 1000 which helped this morning but the pain came back this afternoon. She also used Icyhot which helped a little. She denies numbness or loss of bladder or bowel control. She states that after her injections normally wear off she feels a pain like a \"sword going into her buttocks\" but this pain is different.     Review of Systems   Genitourinary: Negative for enuresis.   Musculoskeletal: Positive for back pain and myalgias (spasms).   Neurological: Negative for numbness.   All other systems reviewed and are negative.        Allergies:  Aspirin  Lisinopril  Metformin  Minocycline  Yellow Dye    Medications:  Oxycodone  Plavix  Boniva  Zanaflex  Wellbutrin  Cymbalta  Amaryl  Cozaar  Toprol-XL  Lipitor  Pepcid  Albuterol inhaler    Past Medical History:    Aortic valve sclerosis  Arrhythmia  Asthma  Anxiety  Chronic kidney disease   Congestive heart failure  Depression  Diabetes mellitus   Hypertension   Lactic acidosis  Migraine  Myocardial infarction  Nephrolithiasis  Osteoarthritis   Rheumatoid arthritis  Sleep apnea  Transient ischemic attack  Ventral hernia  Gastroesophageal reflux disease   Type II diabetes mellitus   Hyperlipidemia     Past Surgical History:    Appendectomy   Cholecystectomy   HC left heart catheterization  Hysterectomy   Knee replacement (L)  Carpal tunnel release  Right femoral artery pseudoaneurysm    Family History:    Mother- Multiple sclerosis   Father- coronary artery disease     Social History:  Presents alone  Patient lives alone  Patient has a cat    Physical Exam     Patient Vitals for the past " "24 hrs:   BP Temp Temp src Pulse Resp SpO2 Height Weight   02/17/21 1930 124/89 -- -- 83 -- 99 % -- --   02/17/21 1900 116/78 -- -- 83 -- 92 % -- --   02/17/21 1800 (!) 161/93 -- -- 82 -- 96 % -- --   02/17/21 1545 (!) 149/59 97.7  F (36.5  C) Temporal 92 16 97 % 1.702 m (5' 7\") 127.5 kg (281 lb)       Physical Exam  Nursing note and vitals reviewed.    Constitutional:  Appears uncomfortable grimacing in pain intermittently consistent with spasms.    Cardiovascular:  Normal rate, regular rhythm.     DP pulse 2+.  Cap refill less than 2 seconds.  Musculoskeletal:  Range of motion of the leg is normal but does cause some pain in the low back. No extremity deformity.     There is right SI joint and gluteal tenderness .  No cyanosis.      Pain over right SI joint and buttock.      Trace edema in leg.   Pulmonary:  Lungs are clear.  GI:   Morbidly obese, nontender.  Neurological:   Alert and oriented. Exhibits good muscle tone.      Sensation in the leg is normal.     GCS eye subscore is 4. GCS verbal subscore is 5.      GCS motor subscore is 6.   Skin:    Skin is warm and dry. No rash noted. No bruising.     No erythema. No pallor.  No lesions.   Psychiatric:   Behavior is normal. Appropriate mood and affect.     Judgment and thought content normal.      Emergency Department Course     Laboratory:  CBC: Pending  BMP: Pending     Asymptomatic COVID-19 Virus (Coronavirus) by PCR: Pending     Emergency Department Course:    Reviewed:  I reviewed nursing notes, vitals, past medical history and care everywhere    Assessments:  1755 I obtained history and examined the patient as noted above.   2055 I rechecked the patient and explained findings and plan for discharge.     Interventions:  1802: Roxicodone 5 mg PO   1802: Roxicodone 5 mg PO   1904: Valium 2.5 mg IV   1904: Fentanyl 25 mcg IV     Consults:  2106: I spoke with Dr. Singh of the Hospitalist service.    Disposition:  The patient was admitted to Dr." Francisco.    Impression & Plan     Medical Decision Making:  Patient comes in with significant worsening of her low back pain, now going down the leg.  She has no symptoms consistent with cauda equina, no fevers, she is not on blood thinners.  She does take oxycodone at home so I gave her 2 tablets initially but did not really touch the pain even after an hour.  She was having intermittent grimacing spasms so I gave her 2.5 mg of IV Valium which seemed to really help the spasms.  Fentanyl 25 mcg IV was given.  Both of these did cause her respiratory status to go down a little bit so she was placed on a little bit of nasal cannula oxygen to keep her sats above 90%.  There is no way that this patient is going to be able to go home and get around with a walker with this much pain in this much medication on board.  She is morbidly obese as well.  She needs to come in the hospital for PT OT evaluation and for pain management.  She will come in observation under the care of Dr. Singh and she may need TCU placement or at least home care.  The patient was agreeable to this.  I cannot find a recent MRI scan of her low back.  This could be considered with her history of spinal stenosis.      Diagnosis:    ICD-10-CM    1. Back muscle spasm  M62.830    2. Lumbar radiculopathy  M54.16        Scribe Disclosure:  I, Razia Urbina, am serving as a scribe at 5:42 PM on 2/17/2021 to document services personally performed by Karmen Jane MD based on my observations and the provider's statements to me.             Karmen Jane MD  02/17/21 0920

## 2021-02-18 ENCOUNTER — APPOINTMENT (OUTPATIENT)
Dept: PHYSICAL THERAPY | Facility: CLINIC | Age: 86
End: 2021-02-18
Attending: INTERNAL MEDICINE
Payer: COMMERCIAL

## 2021-02-18 ENCOUNTER — APPOINTMENT (OUTPATIENT)
Dept: MRI IMAGING | Facility: CLINIC | Age: 86
End: 2021-02-18
Attending: INTERNAL MEDICINE
Payer: COMMERCIAL

## 2021-02-18 LAB
ALBUMIN UR-MCNC: 10 MG/DL
ANION GAP SERPL CALCULATED.3IONS-SCNC: 6 MMOL/L (ref 3–14)
APPEARANCE UR: ABNORMAL
BACTERIA #/AREA URNS HPF: ABNORMAL /HPF
BILIRUB UR QL STRIP: NEGATIVE
BUN SERPL-MCNC: 34 MG/DL (ref 7–30)
CALCIUM SERPL-MCNC: 8.4 MG/DL (ref 8.5–10.1)
CHLORIDE SERPL-SCNC: 108 MMOL/L (ref 94–109)
CO2 SERPL-SCNC: 22 MMOL/L (ref 20–32)
COLOR UR AUTO: YELLOW
CREAT SERPL-MCNC: 1.5 MG/DL (ref 0.52–1.04)
ERYTHROCYTE [DISTWIDTH] IN BLOOD BY AUTOMATED COUNT: 14.5 % (ref 10–15)
GFR SERPL CREATININE-BSD FRML MDRD: 31 ML/MIN/{1.73_M2}
GLUCOSE BLDC GLUCOMTR-MCNC: 109 MG/DL (ref 70–99)
GLUCOSE BLDC GLUCOMTR-MCNC: 124 MG/DL (ref 70–99)
GLUCOSE BLDC GLUCOMTR-MCNC: 125 MG/DL (ref 70–99)
GLUCOSE SERPL-MCNC: 136 MG/DL (ref 70–99)
GLUCOSE UR STRIP-MCNC: NEGATIVE MG/DL
HCT VFR BLD AUTO: 38.6 % (ref 35–47)
HGB BLD-MCNC: 11.9 G/DL (ref 11.7–15.7)
HGB UR QL STRIP: ABNORMAL
KETONES UR STRIP-MCNC: NEGATIVE MG/DL
LEUKOCYTE ESTERASE UR QL STRIP: ABNORMAL
MCH RBC QN AUTO: 26 PG (ref 26.5–33)
MCHC RBC AUTO-ENTMCNC: 30.8 G/DL (ref 31.5–36.5)
MCV RBC AUTO: 85 FL (ref 78–100)
MUCOUS THREADS #/AREA URNS LPF: PRESENT /LPF
NITRATE UR QL: POSITIVE
PH UR STRIP: 5.5 PH (ref 5–7)
PLATELET # BLD AUTO: 309 10E9/L (ref 150–450)
POTASSIUM SERPL-SCNC: 4.2 MMOL/L (ref 3.4–5.3)
RBC # BLD AUTO: 4.57 10E12/L (ref 3.8–5.2)
RBC #/AREA URNS AUTO: 15 /HPF (ref 0–2)
SODIUM SERPL-SCNC: 136 MMOL/L (ref 133–144)
SOURCE: ABNORMAL
SP GR UR STRIP: 1.02 (ref 1–1.03)
SQUAMOUS #/AREA URNS AUTO: 5 /HPF (ref 0–1)
URATE CRY #/AREA URNS HPF: ABNORMAL /HPF
UROBILINOGEN UR STRIP-MCNC: 0 MG/DL (ref 0–2)
WBC # BLD AUTO: 10.3 10E9/L (ref 4–11)
WBC #/AREA URNS AUTO: 4 /HPF (ref 0–5)

## 2021-02-18 PROCEDURE — 250N000011 HC RX IP 250 OP 636: Performed by: INTERNAL MEDICINE

## 2021-02-18 PROCEDURE — 80048 BASIC METABOLIC PNL TOTAL CA: CPT | Performed by: INTERNAL MEDICINE

## 2021-02-18 PROCEDURE — 36415 COLL VENOUS BLD VENIPUNCTURE: CPT | Performed by: INTERNAL MEDICINE

## 2021-02-18 PROCEDURE — 250N000013 HC RX MED GY IP 250 OP 250 PS 637: Performed by: INTERNAL MEDICINE

## 2021-02-18 PROCEDURE — 99225 PR SUBSEQUENT OBSERVATION CARE,LEVEL II: CPT | Performed by: INTERNAL MEDICINE

## 2021-02-18 PROCEDURE — 87086 URINE CULTURE/COLONY COUNT: CPT | Performed by: INTERNAL MEDICINE

## 2021-02-18 PROCEDURE — 96361 HYDRATE IV INFUSION ADD-ON: CPT

## 2021-02-18 PROCEDURE — 99207 PR CDG-CODE CATEGORY CHANGED: CPT | Performed by: INTERNAL MEDICINE

## 2021-02-18 PROCEDURE — 97530 THERAPEUTIC ACTIVITIES: CPT | Mod: GP

## 2021-02-18 PROCEDURE — 999N001017 HC STATISTIC GLUCOSE BY METER IP

## 2021-02-18 PROCEDURE — 96376 TX/PRO/DX INJ SAME DRUG ADON: CPT

## 2021-02-18 PROCEDURE — 72148 MRI LUMBAR SPINE W/O DYE: CPT

## 2021-02-18 PROCEDURE — 258N000003 HC RX IP 258 OP 636: Performed by: INTERNAL MEDICINE

## 2021-02-18 PROCEDURE — G0378 HOSPITAL OBSERVATION PER HR: HCPCS

## 2021-02-18 PROCEDURE — 81001 URINALYSIS AUTO W/SCOPE: CPT | Performed by: INTERNAL MEDICINE

## 2021-02-18 PROCEDURE — 97161 PT EVAL LOW COMPLEX 20 MIN: CPT | Mod: GP

## 2021-02-18 PROCEDURE — 85027 COMPLETE CBC AUTOMATED: CPT | Performed by: INTERNAL MEDICINE

## 2021-02-18 RX ORDER — ACETAMINOPHEN 500 MG
1000 TABLET ORAL
Status: DISCONTINUED | OUTPATIENT
Start: 2021-02-18 | End: 2021-02-21 | Stop reason: HOSPADM

## 2021-02-18 RX ORDER — BACLOFEN 10 MG/1
5 TABLET ORAL 3 TIMES DAILY
Status: DISCONTINUED | OUTPATIENT
Start: 2021-02-18 | End: 2021-02-21 | Stop reason: HOSPADM

## 2021-02-18 RX ORDER — HYDROMORPHONE HYDROCHLORIDE 1 MG/ML
.3-.5 INJECTION, SOLUTION INTRAMUSCULAR; INTRAVENOUS; SUBCUTANEOUS EVERY 6 HOURS PRN
Status: DISCONTINUED | OUTPATIENT
Start: 2021-02-18 | End: 2021-02-20

## 2021-02-18 RX ADMIN — FAMOTIDINE 20 MG: 20 TABLET ORAL at 20:25

## 2021-02-18 RX ADMIN — METOPROLOL SUCCINATE 100 MG: 100 TABLET, EXTENDED RELEASE ORAL at 20:25

## 2021-02-18 RX ADMIN — DULOXETINE HYDROCHLORIDE 60 MG: 30 CAPSULE, DELAYED RELEASE ORAL at 20:25

## 2021-02-18 RX ADMIN — ACETAMINOPHEN 1000 MG: 500 TABLET, FILM COATED ORAL at 15:22

## 2021-02-18 RX ADMIN — TIZANIDINE 2 MG: 2 TABLET ORAL at 02:31

## 2021-02-18 RX ADMIN — ALBUTEROL SULFATE 2 PUFF: 90 AEROSOL, METERED RESPIRATORY (INHALATION) at 06:10

## 2021-02-18 RX ADMIN — HYDROMORPHONE HYDROCHLORIDE 0.5 MG: 1 INJECTION, SOLUTION INTRAMUSCULAR; INTRAVENOUS; SUBCUTANEOUS at 06:08

## 2021-02-18 RX ADMIN — LIDOCAINE 1 PATCH: 560 PATCH PERCUTANEOUS; TOPICAL; TRANSDERMAL at 20:25

## 2021-02-18 RX ADMIN — SODIUM CHLORIDE: 9 INJECTION, SOLUTION INTRAVENOUS at 15:22

## 2021-02-18 RX ADMIN — TIZANIDINE 2 MG: 2 TABLET ORAL at 15:21

## 2021-02-18 RX ADMIN — BACLOFEN 5 MG: 10 TABLET ORAL at 21:24

## 2021-02-18 RX ADMIN — HYDROMORPHONE HYDROCHLORIDE 0.3 MG: 1 INJECTION, SOLUTION INTRAMUSCULAR; INTRAVENOUS; SUBCUTANEOUS at 22:12

## 2021-02-18 RX ADMIN — TIZANIDINE 2 MG: 2 TABLET ORAL at 10:02

## 2021-02-18 RX ADMIN — ACETAMINOPHEN 1000 MG: 500 TABLET, FILM COATED ORAL at 10:02

## 2021-02-18 RX ADMIN — HYDROMORPHONE HYDROCHLORIDE 0.5 MG: 1 INJECTION, SOLUTION INTRAMUSCULAR; INTRAVENOUS; SUBCUTANEOUS at 14:43

## 2021-02-18 RX ADMIN — BUPROPION HYDROCHLORIDE 100 MG: 100 TABLET, FILM COATED, EXTENDED RELEASE ORAL at 20:25

## 2021-02-18 RX ADMIN — Medication 1 MG: at 22:14

## 2021-02-18 RX ADMIN — HYDROMORPHONE HYDROCHLORIDE 0.3 MG: 1 INJECTION, SOLUTION INTRAMUSCULAR; INTRAVENOUS; SUBCUTANEOUS at 02:19

## 2021-02-18 RX ADMIN — ACETAMINOPHEN 1000 MG: 500 TABLET, FILM COATED ORAL at 22:14

## 2021-02-18 RX ADMIN — CLOPIDOGREL BISULFATE 75 MG: 75 TABLET ORAL at 20:25

## 2021-02-18 NOTE — ED NOTES
"[facility]  ED Nurse Handoff Report    ED Chief complaint: Spasms      ED Diagnosis:   Final diagnoses:   Back muscle spasm   Lumbar radiculopathy       Code Status: Full Code    Allergies:   Allergies   Allergen Reactions     Aspirin Hives     Reaction occurred during childhood.      Lisinopril Cough     Metformin      Elevated lactic acid     Minocycline      Yellow Dye Allergy. Minocycline has Yellow Dye #10.     Yellow Dye Hives     Rxn to yellow tablet. Eyes swelled shut.        Patient Story: history of spinal stenosis with weekly injections who presents with muscle spasms. The patient reports yesterday she began experiencing spasms down her right hip and throughout the right leg. She has been taking half an Oxycodone tablet a couple times a day. She also took a Zanaflex muscle relaxer about 1000 which helped this morning but the pain came back this afternoon. She also used Icyhot which helped a little. She denies numbness or loss of bladder or bowel control. She states that after her injections normally wear off she feels a pain like a \"sword going into her buttocks\" but this pain is different.   Focused Assessment:  Denies spasms following IV dose of fentanyl/vallium. PO meds was not controlling pain well. Pt able to sleep comfortably following med admin but has increased pain with position changes.     Treatments and/or interventions provided: Pain medication  Patient's response to treatments and/or interventions: Effective    To be done/followed up on inpatient unit:  Pain control, PT eval    Does this patient have any cognitive concerns?: None    Activity level - Baseline/Home:  Independent  Activity Level - Current:   Unknown    Patient's Preferred language: English   Needed?: No    Isolation: None  Infection: Not Applicable  Patient tested for COVID 19 prior to admission: YES  Bariatric?: No    Vital Signs:   Vitals:    02/17/21 1545 02/17/21 1800 02/17/21 1900 02/17/21 1930 " "  BP: (!) 149/59 (!) 161/93 116/78 124/89   Pulse: 92 82 83 83   Resp: 16      Temp: 97.7  F (36.5  C)      TempSrc: Temporal      SpO2: 97% 96% 92% 99%   Weight: 127.5 kg (281 lb)      Height: 1.702 m (5' 7\")          Cardiac Rhythm:     Was the PSS-3 completed:   Yes  What interventions are required if any?               Family Comments: Daughter in law, Sophia  OBS brochure/video discussed/provided to patient/family: Yes              Name of person given brochure if not patient: Pt              Relationship to patient:     For the majority of the shift this patient's behavior was Green.   Behavioral interventions performed were reassurance, redirection.    ED NURSE PHONE NUMBER: [phone]           "

## 2021-02-18 NOTE — PROGRESS NOTES
RECEIVING UNIT ED HANDOFF REVIEW    ED Nurse Handoff Report was reviewed by: Muriel Grigsby RN on February 17, 2021 at 10:36 PM

## 2021-02-18 NOTE — CONSULTS
[facility]    [facility] Orth[facility]/Neurosurgery Consultation     Date of Admission:  2/17/2021  Date of Consult (When I saw the patient): 02/18/21    Assessment & Plan   Moira Andre is an 87 year old female who was admitted on 2/17/2021.  I was asked to see the patient for low back pain and RLE pain with a h/o lumbar stenosis.    Active Problems:    Lumbar radiculopathy    Assessment: Grade I L5-S1 spondylolisthesis, L2-S1 moderate to severe spinal canal stenosis, low back pain, right leg pain pain    Plan: Patient is not interested in surgical intervention and would like to continue pain management and injections prn at [facility].  Recommend continued conservative care and pain management; pt may benefit from TCU for PT/OT.          I have discussed the following assessment and plan with Dr. Gaurav Grier who is in agreement with initial plan and will follow up with further consultation recommendations.    Helen Murry [facility]  Tel [phone]      Code Status    Full Code    Reason for Consult   Reason for consult: I was asked by Tamra Bbo PA-C to evaluate this patient for low back and RLE pain with h/o lumbar stenosis.    Primary Care Physician   Maryan Churchill    Chief Complaint   Low back and right leg pain    History is obtained from the patient and patient's EMR    History of Present Illness   Moira Andre is a morbidly obese 87 year old female with an extensive medical history for CAD with stenting, CHF, HTN, DM, asthma and GERD, who presented to [facility] ED with increased right lower back pain and RLE. She has a known h/o lumbar stenosis and states she is seen at [facility] for pain management.  She receives injections for her low back pain every 5-6 months and states they are generally helpful.  She denies any changes in her usual pain pattern, only a flare with increased symptoms.  Today she states her main pain is along the right buttock.  She has spasms as  "well.  She finds Oxycodone helpful along with Lidoderm patches. She has been on Zanaflex, but is unsure if it is helping.  She stats she has used her walker, but generally does not use it all of the time.  Her pain increases with activity and movement. She does have mild weakness sometimes during the exam, but mainly seems to be related to pain as she is able to carry out all of the strength components of the exam.  She denies any falls at home.  When asked if she has weakness she states no, \"It's just from pain.\"  She has been able to stand with assist to commode.  We reviewed her lumbar MRI findings showing multilevel degenerative disc disease and multilevel L2-S1 moderate to severe spinal canal stenosis which the patient states she was already aware of from previous MRI.  She states she is not interested in any type of spine surgery and is very well aware of risks associated with surgery at her age as well as with her co morbidities. We discussed pain control and PT/OT, likely benefiting from a TCU type facility.  We discussed a possible lumbar [place] for her pain, however, the patient states she would like to f/u with [facility] for any further injections, \"Because it's not time for the next one yet.\"        Past Medical History   I have reviewed this patient's medical history and updated it with pertinent information if needed.   Past Medical History:   Diagnosis Date     Aortic valve sclerosis     heart murmur, no AS     Arrhythmia     PAT, PVC     Aspirin allergy     Plavix use long term     Asthma      CKD (chronic kidney disease) stage 3, GFR 30-59 ml/min     x 2007 atleast     Congestive heart failure, unspecified      Depression      Diabetes mellitus (H) 2010     Diastolic dysfunction, left ventricle 2013    grade 2, nl ef     HTN (hypertension)      Lactic acidosis 08/2018    due to dehydration and metformin     Migraine headache      Mitral stenosis     mild, 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     Myocardial infarction (H) " 9/2007, cath 2013 ml    BMS: stent to OM, diag, nl EF, echo /C angia 2013 , f/u cath no lesion >40%     Nephrolithiasis     right side     OA (osteoarthritis) of knee      Obesity      Rheumatoid arthritis flare (H)     prednisone     Sleep apnea     restarted using cpap 2017     TIA (transient ischaemic attack)      Ventral hernia, unspecified, without mention of obstruction or gangrene        Past Surgical History   I have reviewed this patient's surgical history and updated it with pertinent information if needed.  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 Taking?   Blood Glucose Monitoring Suppl (TRUE METRIX AIR GLUCOSE METER) W/DEVICE KIT  Self No No   Sig: USE AS DIRECTED   Cholecalciferol (VITAMIN D) 2000 UNITS CAPS 2/16/2021 at PM Self Yes Yes   Sig: Take 2,000 Units by mouth daily    DULoxetine (CYMBALTA) 60 MG capsule 2/16/2021 at PM Self No Yes   Sig: Take 1 capsule (60 mg) by mouth daily for depression   IBANdronate (BONIVA) 150 MG tablet 2/1/2021 Self No Yes   Sig: TAKE 1 TABLET EVERY 30 DAYS FOR OSTEOPENIA   Menthol, Topical Analgesic, (ICY HOT EX) 2/16/2021 at Unknown time Self Yes Yes   Sig: Apply to affected area every morning   TRUE METRIX BLOOD GLUCOSE TEST test strip  Self No No   Sig: TEST BLOOD GLUCOSE EVERY DAY   TRUEPLUS  LANCETS 28G MISC  Self No No   Si each 2 times daily as needed   WIXELA INHUB 100-50 MCG/DOSE inhaler 2021 at AM Self No Yes   Sig: INHALE 1 PUFF EVERY 12 HOURS   acetaminophen (TYLENOL) 325 MG tablet  at PRN Self Yes Yes   Sig: Take 325-650 mg by mouth every 6 hours as needed for mild pain   acetaminophen (TYLENOL) 325 MG tablet 2021 at AM Self Yes Yes   Sig: Take 650 mg by mouth 2 times daily   albuterol (PROAIR HFA/PROVENTIL HFA/VENTOLIN HFA) 108 (90 Base) MCG/ACT inhaler  at PRN Self No Yes   Sig: Inhale 2 puffs into the lungs every 6 hours For shortness of breath   atorvastatin (LIPITOR) 20 MG tablet 2021 at PM Self No Yes   Sig: TAKE 1 TABLET EVERY DAY   blood glucose monitoring (NO BRAND SPECIFIED) meter device kit  Self No No   Sig: Use to test blood sugar 1-2 times daily or as directed.   buPROPion (WELLBUTRIN SR) 100 MG 12 hr tablet 2021 at PM Self No Yes   Sig: Take 1 tablet (100 mg) by mouth 2 times daily   Patient taking differently: Take 100 mg by mouth every evening    clopidogrel (PLAVIX) 75 MG tablet 2021 at PM Self No Yes   Sig: TAKE 1 TABLET EVERY DAY (NEED MD APPOINTMENT)   clotrimazole (LOTRIMIN) 1 % external cream  Self No No   Sig: Apply topically 2 times daily   famotidine (PEPCID) 20 MG tablet 2021 at pm Self No Yes   Sig: TAKE 1 TABLET TWICE DAILY (REPLACES RANITIDINE)   Patient taking differently: Take 20 mg by mouth every evening    fluticasone (FLONASE) 50 MCG/ACT nasal spray  at PRN Self Yes Yes   Sig: Spray 1 spray into both nostrils daily as needed    glimepiride (AMARYL) 2 MG tablet 2021 at QPM Self No Yes   Sig: Take 1 tablet (2 mg) by mouth every morning (before breakfast) for diabetese   losartan (COZAAR) 50 MG tablet 2021 at PM Self No Yes   Sig: Take 1 tablet (50 mg) by mouth daily for Blood Pressure   metoprolol succinate ER (TOPROL-XL) 100 MG 24 hr tablet 2021 at PM Self No Yes   Sig: Take 1 tablet (100 mg) by mouth daily    nitroGLYcerin (NITROSTAT) 0.4 MG sublingual tablet 2021 at Unknown time Self No Yes   Sig: For chest pain place 1 tablet under the tongue every 5 minutes for 3 doses. If symptoms persist 5 minutes after 1st dose call 911.   nystatin-triamcinolone (MYCOLOG II) 872597-7.1 UNIT/GM-% external cream Past Week at Unknown time Self No Yes   Sig: APPLY TOPICALLY DAILY PRN   order for DME  Self Yes No   Sig: DREAMSTATION  5-15 CM/H20  NASAL WISP FABRIC   order for DME  Self No No   Sig: Equipment being ordered: diabetic shoes   oxyCODONE (ROXICODONE) 5 MG tablet 2021 at Unknown time Self No Yes   Sig: Take 1 tablet (5 mg) by mouth 2 times daily as needed for pain   tiZANidine (ZANAFLEX) 2 MG tablet 2021 at Unknown time Self No Yes   Sig: Take 1 tablet (2 mg) by mouth 3 times daily as needed for muscle spasms      Facility-Administered Medications: None     Allergies   Allergies   Allergen Reactions     Aspirin Hives     Reaction occurred during childhood.      Lisinopril Cough     Metformin      Elevated lactic acid     Minocycline      Yellow Dye Allergy. Minocycline has Yellow Dye #10.     Yellow Dye Hives     Rxn to yellow tablet. Eyes swelled shut.        Social History   I have reviewed this patient's social history and updated it with pertinent information if needed. Moira Andre  reports that she quit smoking about 47 years ago. Her smoking use included cigarettes. She started smoking about 49 years ago. She smoked 0.25 packs per day. She has never used smokeless tobacco. She reports current alcohol use. She reports that she does not use drugs.    Family History   I have reviewed this patient's family history and updated it with pertinent information if needed.   Family History   Problem Relation Age of Onset     Neurologic Disorder Mother         MS - at 60's     C.A.D. Father          at 8o's, ? prostate ca     Breast Cancer No family hx of      Cancer - colorectal No family hx of   "      Review of Systems    Positives noted in HPI    Physical Exam   Temp: 98.2  F (36.8  C) Temp src: Oral BP: (!) 163/70 Pulse: 71   Resp: 18 SpO2: 94 % O2 Device: None (Room air)    Vital Signs with Ranges  Temp:  [97.7  F (36.5  C)-98.2  F (36.8  C)] 98.2  F (36.8  C)  Pulse:  [71-92] 71  Resp:  [16-22] 18  BP: (111-170)/(28-97) 163/70  SpO2:  [91 %-99 %] 94 %  281 lbs 0 oz     , Blood pressure (!) 163/70, pulse 71, temperature 98.2  F (36.8  C), temperature source Oral, resp. rate 18, height 5' 7\" (1.702 m), weight 281 lb (127.5 kg), SpO2 94 %, not currently breastfeeding.  281 lbs 0 oz   Constitutional: NAD, morbidly obese  HEENT:  Normocephalic, atraumatic. EOM s intact.   Heart:  Peripheral edema to ankles/feet bilaterally  Lungs:  No SOB  Abdomen:  Pendulous abdomen  Skin:  Good capillary refill.    NEUROLOGICAL EXAMINATION:   Mental status:  Awake and alert;  speech is fluent.  Cranial nerves:  II-XII grossly intact.   Motor:    Shoulder Abduction:  Right:  5/5   Left:  5/5  Biceps:                      Right:  5/5   Left:  5/5  Triceps:                     Right:  5/5   Left:  5/5  Wrist Extensors:       Right:  5/5   Left:  5/5  Wrist Flexors:           Right:  5/5   Left:  5/5  interosseus :            Right:  5/5   Left:  5/5  Hip Flexor:                Right: 5/5  Left:  5/5  Hip Adductor:             Right:  5/5  Left:  5/5  Hip Abductor:             Right:  5/5  Left:  5/5  Gastroc Soleus:        Right:  5/5  Left:  5/5  Tib/Ant:                      Right:  5/5  Left:  5/5  DF:                            Right:  4-5/5  Left:  4-5/5  PF:                              Right 5/5  Left:  5/5  Sensation:  Intact to light touch  Reflexes:   Negative Clonus.    Gait:  Deferred; sitting on edge of bed.  Able to raise leg off bed bilaterally with pain.     Cervical examination reveals good range of motion.  No tenderness to palpation of the cervical spine or paraspinous muscles bilaterally.     Lumbar " examination reveals no tenderness of the spine or paraspinous muscles. She has pain to deep palpation to right SIJ.   Straight leg raise is negative bilaterally.       Data   CBC RESULTS:   Recent Labs   Lab Test 02/18/21  0600   WBC 10.3   RBC 4.57   HGB 11.9   HCT 38.6   MCV 85   MCH 26.0*   MCHC 30.8*   RDW 14.5        Basic Metabolic Panel:  Lab Results   Component Value Date     02/18/2021      Lab Results   Component Value Date    POTASSIUM 4.2 02/18/2021     Lab Results   Component Value Date    CHLORIDE 108 02/18/2021     Lab Results   Component Value Date    TELLY 8.4 02/18/2021     Lab Results   Component Value Date    CO2 22 02/18/2021     Lab Results   Component Value Date    BUN 34 02/18/2021     Lab Results   Component Value Date    CR 1.50 02/18/2021     Lab Results   Component Value Date     02/18/2021     INR:  Lab Results   Component Value Date    INR 0.93 07/19/2017    INR 0.99 09/21/2016    INR 1.03 08/13/2013    INR 1.06 10/12/2007    INR 1.09 10/08/2007    INR 1.01 09/25/2007    INR 0.94 07/21/2007    INR 1.09 01/30/2006

## 2021-02-18 NOTE — ED NOTES
Took over for MIRZA Harry. Pt resting comfortably on cart. Pt states decrease in pain, no longer having spasms and able to fall asleep. Resp regular rate/rhythm, non-labored.

## 2021-02-18 NOTE — PHARMACY-ADMISSION MEDICATION HISTORY
Pharmacy Medication History  Admission medication history interview status for the 2/17/2021  admission is complete. See EPIC admission navigator for prior to admission medications     Location of Interview: Patient room  Medication history sources: Patient      In the past week, patient estimated taking medication this percent of the time: greater than 90%    Additional medication history information:   Takes all meds qPM.  Takes some BID meds only once in PM     Medication reconciliation completed by provider prior to medication history? No    Time spent in this activity: 20 minutes    Prior to Admission medications    Medication Sig Last Dose Taking? Auth Provider   acetaminophen (TYLENOL) 325 MG tablet Take 650 mg by mouth 2 times daily 2/17/2021 at AM Yes Unknown, Entered By History   acetaminophen (TYLENOL) 325 MG tablet Take 325-650 mg by mouth every 6 hours as needed for mild pain  at PRN Yes Reported, Patient   albuterol (PROAIR HFA/PROVENTIL HFA/VENTOLIN HFA) 108 (90 Base) MCG/ACT inhaler Inhale 2 puffs into the lungs every 6 hours For shortness of breath  at PRN Yes Maryan Churchill MD   atorvastatin (LIPITOR) 20 MG tablet TAKE 1 TABLET EVERY DAY 2/16/2021 at PM Yes Maryan Churchill MD   buPROPion (WELLBUTRIN SR) 100 MG 12 hr tablet Take 1 tablet (100 mg) by mouth 2 times daily  Patient taking differently: Take 100 mg by mouth every evening  2/16/2021 at PM Yes Maryan Churchill MD   Cholecalciferol (VITAMIN D) 2000 UNITS CAPS Take 2,000 Units by mouth daily  2/16/2021 at PM Yes Reported, Patient   clopidogrel (PLAVIX) 75 MG tablet TAKE 1 TABLET EVERY DAY (NEED MD APPOINTMENT) 2/16/2021 at PM Yes Maryan Churchill MD   DULoxetine (CYMBALTA) 60 MG capsule Take 1 capsule (60 mg) by mouth daily for depression 2/16/2021 at PM Yes Maryan Churchill MD   famotidine (PEPCID) 20 MG tablet TAKE 1 TABLET TWICE DAILY (REPLACES RANITIDINE)  Patient taking differently: Take 20 mg by mouth every evening  2/16/2021 at  pm Yes Maryan Churchill MD   fluticasone (FLONASE) 50 MCG/ACT nasal spray Spray 1 spray into both nostrils daily as needed   at PRN Yes Reported, Patient   glimepiride (AMARYL) 2 MG tablet Take 1 tablet (2 mg) by mouth every morning (before breakfast) for diabetese 2/16/2021 at QPM Yes Maryan Churchill MD   IBANdronate (BONIVA) 150 MG tablet TAKE 1 TABLET EVERY 30 DAYS FOR OSTEOPENIA 2/1/2021 Yes Maryan Churchill MD   losartan (COZAAR) 50 MG tablet Take 1 tablet (50 mg) by mouth daily for Blood Pressure 2/16/2021 at PM Yes Maryan Churchill MD   Menthol, Topical Analgesic, (ICY HOT EX) Apply to affected area every morning 2/16/2021 at Unknown time Yes Unknown, Entered By History   metoprolol succinate ER (TOPROL-XL) 100 MG 24 hr tablet Take 1 tablet (100 mg) by mouth daily 2/16/2021 at PM Yes Maryan Churchill MD   nitroGLYcerin (NITROSTAT) 0.4 MG sublingual tablet For chest pain place 1 tablet under the tongue every 5 minutes for 3 doses. If symptoms persist 5 minutes after 1st dose call 911. 2/16/2021 at Unknown time Yes Maryan Churchill MD   nystatin-triamcinolone (MYCOLOG II) 744515-6.1 UNIT/GM-% external cream APPLY TOPICALLY DAILY PRN Past Week at Unknown time Yes Maryan Churchill MD   oxyCODONE (ROXICODONE) 5 MG tablet Take 1 tablet (5 mg) by mouth 2 times daily as needed for pain 2/16/2021 at Unknown time Yes Maryan Churchill MD   tiZANidine (ZANAFLEX) 2 MG tablet Take 1 tablet (2 mg) by mouth 3 times daily as needed for muscle spasms 2/16/2021 at Unknown time Yes Maryan Churchill MD   WIXELA INHUB 100-50 MCG/DOSE inhaler INHALE 1 PUFF EVERY 12 HOURS 2/17/2021 at AM Yes Maryan Churchill MD   blood glucose monitoring (NO BRAND SPECIFIED) meter device kit Use to test blood sugar 1-2 times daily or as directed.   Maryan Churchill MD   Blood Glucose Monitoring Suppl (TRUE METRIX AIR GLUCOSE METER) W/DEVICE KIT USE AS DIRECTED   Maryan Churchill MD   clotrimazole (LOTRIMIN) 1 % external cream Apply  topically 2 times daily   Maryan Churchill MD   order for DME Equipment being ordered: diabetic shoes   Maryan Churchill MD   order for DME DREAMSTATION  5-15 CM/H20  NASAL WISP FABRIC   Reported, Patient   TRUE METRIX BLOOD GLUCOSE TEST test strip TEST BLOOD GLUCOSE EVERY DAY   Maryan Churchill MD   TRUEPLUS LANCETS 28G MISC 1 each 2 times daily as needed   Maryan Churchill MD       The information provided in this note is only as accurate as the sources available at the time of update(s)

## 2021-02-18 NOTE — PLAN OF CARE
PRIMARY DIAGNOSIS: ACUTE PAIN  OUTPATIENT/OBSERVATION GOALS TO BE MET BEFORE DISCHARGE:  1. Pain Status: Improved-controlled with oral pain medications.    2. Return to near baseline physical activity: No    3. Cleared for discharge by consultants (if involved): No, needs to be seen by Spine Surgery    Discharge Planner Nurse   Safe discharge environment identified: No  Barriers to discharge: Yes       Entered by: Shy Zazueta 02/18/2021 12:04 PM     Please review provider order for any additional goals.   Nurse to notify provider when observation goals have been met and patient is ready for discharge.

## 2021-02-18 NOTE — PLAN OF CARE
Observation Goals:    -diagnostic tests and consults completed and resulted: not met  -vital signs normal or at patient baseline: partially  -adequate pain control on oral analgesics: partially  -returns to baseline functional status: not met  -safe disposition plan has been identified: not met  Nurse to notify provider when observation goals have been met and patient is ready for discharge.

## 2021-02-18 NOTE — UTILIZATION REVIEW
Concurrent stay review; Secondary Review Determination    Under the authority of the Utilization Management Committee, the utilization review process indicated a secondary review on the above patient. The review outcome is based on review of the medical records, discussions with staff, and applying clinical experience noted on the date of the review.    (x) Observation Status Appropriate - Concurrent stay review        RATIONALE FOR DETERMINATION: 87-year-old female followed by outpatient orthopedic team for significant spinal canal stenosis and low back pain as well as radicular right leg pain.  Patient presented to the hospital complaining of significant muscle spasms in her right hip through her leg failed to be controlled with her outpatient oxycodone and muscle relaxants.  Due to patient's pain, morbid obesity and age unable to return home and thus admitted under observation care for optimal pain control and disposition arrangement.  Patient not interested in inpatient surgical intervention and therefore is awaiting placement on oral pain medication regimen.    Patient is clinically improving and there is no clear indication to change patient's status to inpatient. The severity of illness, intensity of service provided, expected LOS and risk for adverse outcome make the care appropriate for observation.    This document was produced using voice recognition software    The information on this document is developed by the utilization review team in order for the business office to ensure compliance. This only denotes the appropriateness of proper admission status and does not reflect the quality of care rendered.    The definitions of Inpatient Status and Observation Status used in making the determination above are those provided in the CMS Coverage Manual, Chapter 1 and Chapter 6, section 70.4.    Sincerely,    Jose Hernández MD  Utilization Review  Physician Advisor  [facility].

## 2021-02-18 NOTE — PLAN OF CARE
PRIMARY DIAGNOSIS: ACUTE PAIN  OUTPATIENT/OBSERVATION GOALS TO BE MET BEFORE DISCHARGE:  1. Pain Status: No improvement noted. Consider adjustment in pain regimen.    2. Return to near baseline physical activity: No    3. Cleared for discharge by consultants (if involved): No    Discharge Planner Nurse   Safe discharge environment identified: No  Barriers to discharge: Yes       Entered by: Shy Zazueta 02/18/2021 8:15 AM     Please review provider order for any additional goals.   Nurse to notify provider when observation goals have been met and patient is ready for discharge.

## 2021-02-18 NOTE — PROGRESS NOTES
"   02/18/21 1024   Quick Adds   Type of Visit Initial PT Evaluation   Living Environment   People in home alone   Current Living Arrangements apartment;independent living facility   Home Accessibility no concerns   Self-Care   Usual Activity Tolerance fair   Current Activity Tolerance poor   Regular Exercise No   Equipment Currently Used at Home walker, rolling  (4ww)   Activity/Exercise/Self-Care Comment Pt is not very mobile at baseline - has a small apartment, entrance is very close to her apartment door, laundry is right outside her apartment.   Disability/Function   Fall history within last six months yes   Number of times patient has fallen within last six months 1  (pt has a high fear of falling)   General Information   Onset of Illness/Injury or Date of Surgery 02/17/21   Referring Physician Benja Singh MD   Patient/Family Therapy Goals Statement (PT) Return home   Pertinent History of Current Problem (include personal factors and/or comorbidities that impact the POC) Pt admitted under observation status with R LE pain and possible spasms. PMH: CHF, HTN, CAD, spinal stenosis, CKD, DM2, MDD, asthma, ELIGIO, morbid obesity, MI.   Existing Precautions/Restrictions fall   Weight-Bearing Status - LLE full weight-bearing   Weight-Bearing Status - RLE full weight-bearing   Cognition   Orientation Status (Cognition) person;place;situation   Affect/Mental Status (Cognition) WNL   Follows Commands (Cognition) WNL   Pain Assessment   Patient Currently in Pain Yes, see Vital Sign flowsheet  (R hip/lateral buttock area, shooting pains \"10/10\")   Posture    Posture Forward head position;Protracted shoulders   Range of Motion (ROM)   ROM Comment B LE ROM limited by excessive adipose tissue, R hip motion limited in all planes due to pain   Strength   Strength Comments B LEs functionally weak, did not tolerate MMT due to R hip/buttock pain   Bed Mobility   Comment (Bed Mobility) Supine to sit with max A at trunk and LEs "   Transfers   Transfer Safety Comments Sit to stand with min A and 4ww   Gait/Stairs (Locomotion)   Comment (Gait/Stairs) Pt amb 2 ft bed to commode with 4ww, pain in R hip/buttock area, CGA needed   Balance   Balance Comments Dec in balance noted with standing at 4ww and with gait   Sensory Examination   Sensory Perception Comments Denies numbness or tingling in B LEs   Clinical Impression   Criteria for Skilled Therapeutic Intervention yes, treatment indicated   PT Diagnosis (PT) Difficulty ambulating   Influenced by the following impairments Pain, dec strength, balance, activity tolerance   Functional limitations due to impairments Difficulty ambulating and transferring   Clinical Presentation Evolving/Changing   Clinical Presentation Rationale medically working up   Clinical Decision Making (Complexity) low complexity   Therapy Frequency (PT) 3x/week   Predicted Duration of Therapy Intervention (days/wks) 1 week   Planned Therapy Interventions (PT) bed mobility training;gait training;neuromuscular re-education;strengthening;transfer training   Risk & Benefits of therapy have been explained evaluation/treatment results reviewed;care plan/treatment goals reviewed;risks/benefits reviewed;current/potential barriers reviewed;participants voiced agreement with care plan   PT Discharge Planning    PT Discharge Recommendation (DC Rec) Transitional Care Facility   PT Rationale for DC Rec Pt is not independent with mobility at this time and does not have help available at home, so would need to discharge to TCU. Pt is very much wanting to return home at discharge. Would need assist with all mobility and home PT and OT if she returns home.   Total Evaluation Time   Total Evaluation Time (Minutes) 15

## 2021-02-18 NOTE — H&P
[facility]    History and Physical  Hospitalist       Date of Admission:  2/17/2021  Date of Service (when I saw the patient): 02/17/21    Assessment & Plan   Moira Andre is a 87 year old female who presents with muscle spasms    Asymptomatic COVID-19 pending on admission, low suspicion    Spinal stenosis  Possible muscle spasms  [acetaminophen, prn oxycodone, tizanidine 2 mg TID prn]  Follows outpatient with [facility]. Day prior to presentation with muscle spasms down R hip and throughout R leg. Tried oxycodone, Zanaflex initially but pain came back today. No cauda sx. Reports pain is different from normal pain. WBC 12.4. given fentanyl 25 mcg, oxycodone 15 mg total, valium in the ED. Remains in severe distress  - MRI lumbar spine  - orthopedic surgery consult  - schedule acetaminophen 1000 mg q6 hours  - oxycodone, IV hydromorphone  - ketamine 10 mg IV q4 hours prn  - lidoderm patch to lower back  - prn tizanidine  - continuous pulse oximetry  - no NSAID's with kidney disease  - physical therapy, suspect she will need placement    Leukocytosis  WBC on admission at 12.4, seems to be intermittent hidtorically. Afebrile.   - repeat in the am   - workup if fever or persistent    CAD with h/o stenting ~2006  Valvular heart disease (aortic stenosis, mitral stenosis)  H/o diastolic CHF  Hyperlipidemia  Hypertension  [atorvastatin 20 mg daily, plavix 75 mg daily, losartan 50 mg daily, metoprolol 100 mg daily, prn NTG]  Stenting 13 years ago. Angio 2017 with disease but not intervened. Last echo 11/2020 EF 60-65%, noted valvular abnormalities.   - continue meds, hold statin in obs  - hold ARB with mild TIFFANI    CKD  Baseline creatinine ~1.3-1.5 recently. Creatinine at the time of admission at 1.69.   - hold ARB  - gentle fluids tonight  - monitor  - avoid nephrotoxins    DM II  [glimepiride 2 mg daily]  Last A1C 6.2% 8/2020.   - continue glimepiride  - BID and HS blood sugars  - mod CHO  diet    MDD  [bupropion 100 mg at bedtime, duloxetine 50 mg daily]  - continue meds    GERD  famotidine 20 mg at bedtime, continue    Asthma  [Wixela one puff BID, albuterol prn]  - hold Wixela in obs  - prn albuterol    ELIGIO  Continue home CPAP    Morbid obesity  Encourage weight loss and healthy lifestyle    DVT Prophylaxis: Pneumatic Compression Devices  Code Status: Full Code    Disposition: Expected discharge in 2-3 days    Benja Singh MD  [phone] (P)  Text Page     Primary Care Physician   Dr. Maryan Churchill    Chief Complaint   Back, R leg pain    History is obtained from the patient and medical records    History of Present Illness   Moira Andre is a 87 year old female who presents with severe back pain.  She follows outpatient with [facility] and gets injections every 5 to 6 months.  She has severe spinal stenosis but is a poor operating candidate secondary to her comorbidities.  She states the day before presentation she started to have pain in her low back that went down into her right leg and thigh area.  She was trying to use oxycodone and Zanaflex at home but the pain became unbearable.  She has not had a recent MRI.  She has been dealing with stenosis for years.  She denies any new incontinence but does have chronic incontinence.  She is having bowel movements.  Denies abdominal pain.  No nausea or vomiting.  No chest pain.  She has underlying chronic shortness of breath.  She has no fevers or chills.  She is barely able to ambulate with this pain.    Past Medical History    I have reviewed this patient's medical history and updated it with pertinent information if needed.   Past Medical History:   Diagnosis Date     Aortic valve sclerosis     heart murmur, no AS     Arrhythmia     PAT, PVC     Aspirin allergy     Plavix use long term     Asthma      CKD (chronic kidney disease) stage 3, GFR 30-59 ml/min     x 2007 atleast     Congestive heart failure, unspecified      Depression       Diabetes mellitus (H) 2010     Diastolic dysfunction, left ventricle 2013    grade 2, nl ef     HTN (hypertension)      Lactic acidosis 08/2018    due to dehydration and metformin     Migraine headache      Mitral stenosis     mild, 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     Myocardial infarction (H) 9/2007, cath 2013 ml    BMS: stent to OM, diag, nl EF, echo /C angia 2013 , f/u cath no lesion >40%     Nephrolithiasis     right side     OA (osteoarthritis) of knee      Obesity      Rheumatoid arthritis flare (H)     prednisone     Sleep apnea     restarted using cpap 2017     TIA (transient ischaemic attack)      Ventral hernia, unspecified, without mention of obstruction or gangrene        Past Surgical History   I have reviewed this patient's surgical history and updated it with pertinent information if needed.  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 Taking?   Blood Glucose Monitoring Suppl (TRUE METRIX AIR GLUCOSE METER) W/DEVICE KIT  Self No No   Sig: USE AS DIRECTED   Cholecalciferol (VITAMIN D) 2000 UNITS CAPS 2/16/2021 at PM Self Yes Yes   Sig: Take 2,000 Units by mouth daily    DULoxetine (CYMBALTA) 60 MG capsule 2/16/2021 at PM Self No Yes   Sig: Take 1 capsule (60 mg) by mouth daily for depression   IBANdronate (BONIVA) 150 MG tablet 2/1/2021 Self No Yes    Sig: TAKE 1 TABLET EVERY 30 DAYS FOR OSTEOPENIA   Menthol, Topical Analgesic, (ICY HOT EX) 2021 at Unknown time Self Yes Yes   Sig: Apply to affected area every morning   TRUE METRIX BLOOD GLUCOSE TEST test strip  Self No No   Sig: TEST BLOOD GLUCOSE EVERY DAY   TRUEPLUS LANCETS 28G MISC  Self No No   Si each 2 times daily as needed   WIXELA INHUB 100-50 MCG/DOSE inhaler 2021 at AM Self No Yes   Sig: INHALE 1 PUFF EVERY 12 HOURS   acetaminophen (TYLENOL) 325 MG tablet  at PRN Self Yes Yes   Sig: Take 325-650 mg by mouth every 6 hours as needed for mild pain   acetaminophen (TYLENOL) 325 MG tablet 2021 at AM Self Yes Yes   Sig: Take 650 mg by mouth 2 times daily   albuterol (PROAIR HFA/PROVENTIL HFA/VENTOLIN HFA) 108 (90 Base) MCG/ACT inhaler  at PRN Self No Yes   Sig: Inhale 2 puffs into the lungs every 6 hours For shortness of breath   atorvastatin (LIPITOR) 20 MG tablet 2021 at PM Self No Yes   Sig: TAKE 1 TABLET EVERY DAY   blood glucose monitoring (NO BRAND SPECIFIED) meter device kit  Self No No   Sig: Use to test blood sugar 1-2 times daily or as directed.   buPROPion (WELLBUTRIN SR) 100 MG 12 hr tablet 2021 at PM Self No Yes   Sig: Take 1 tablet (100 mg) by mouth 2 times daily   Patient taking differently: Take 100 mg by mouth every evening    clopidogrel (PLAVIX) 75 MG tablet 2021 at PM Self No Yes   Sig: TAKE 1 TABLET EVERY DAY (NEED MD APPOINTMENT)   clotrimazole (LOTRIMIN) 1 % external cream  Self No No   Sig: Apply topically 2 times daily   famotidine (PEPCID) 20 MG tablet 2021 at pm Self No Yes   Sig: TAKE 1 TABLET TWICE DAILY (REPLACES RANITIDINE)   Patient taking differently: Take 20 mg by mouth every evening    fluticasone (FLONASE) 50 MCG/ACT nasal spray  at PRN Self Yes Yes   Sig: Spray 1 spray into both nostrils daily as needed    glimepiride (AMARYL) 2 MG tablet 2021 at QPM Self No Yes   Sig: Take 1 tablet (2 mg) by mouth every morning (before  breakfast) for diabetese   losartan (COZAAR) 50 MG tablet 2/16/2021 at PM Self No Yes   Sig: Take 1 tablet (50 mg) by mouth daily for Blood Pressure   metoprolol succinate ER (TOPROL-XL) 100 MG 24 hr tablet 2/16/2021 at PM Self No Yes   Sig: Take 1 tablet (100 mg) by mouth daily   nitroGLYcerin (NITROSTAT) 0.4 MG sublingual tablet 2/16/2021 at Unknown time Self No Yes   Sig: For chest pain place 1 tablet under the tongue every 5 minutes for 3 doses. If symptoms persist 5 minutes after 1st dose call 911.   nystatin-triamcinolone (MYCOLOG II) 482553-7.1 UNIT/GM-% external cream Past Week at Unknown time Self No Yes   Sig: APPLY TOPICALLY DAILY PRN   order for DME  Self Yes No   Sig: DREAMSTATION  5-15 CM/H20  NASAL WISP FABRIC   order for DME  Self No No   Sig: Equipment being ordered: diabetic shoes   oxyCODONE (ROXICODONE) 5 MG tablet 2/16/2021 at Unknown time Self No Yes   Sig: Take 1 tablet (5 mg) by mouth 2 times daily as needed for pain   tiZANidine (ZANAFLEX) 2 MG tablet 2/16/2021 at Unknown time Self No Yes   Sig: Take 1 tablet (2 mg) by mouth 3 times daily as needed for muscle spasms      Facility-Administered Medications: None     Allergies   Allergies   Allergen Reactions     Aspirin Hives     Reaction occurred during childhood.      Lisinopril Cough     Metformin      Elevated lactic acid     Minocycline      Yellow Dye Allergy. Minocycline has Yellow Dye #10.     Yellow Dye Hives     Rxn to yellow tablet. Eyes swelled shut.        Social History   I have reviewed this patient's social history and updated it with pertinent information if needed. Moira Andre  reports that she quit smoking about 47 years ago. Her smoking use included cigarettes. She started smoking about 49 years ago. She smoked 0.25 packs per day. She has never used smokeless tobacco. She reports current alcohol use. She reports that she does not use drugs.    Family History   I have reviewed this patient's family history and updated it  with pertinent information if needed.   Family History   Problem Relation Age of Onset     Neurologic Disorder Mother         MS - at 60's     C.A.D. Father          at 8o's, ? prostate ca     Breast Cancer No family hx of      Cancer - colorectal No family hx of        Review of Systems   The 10 point Review of Systems is negative other than noted in the HPI or here.     Physical Exam   Temp: 97.7  F (36.5  C) Temp src: Temporal BP: 124/89 Pulse: 83   Resp: 16 SpO2: 99 % O2 Device: None (Room air)    Vital Signs with Ranges  281 lbs 0 oz    Constitutional: alert, oriented, severe distress from pain  Eyes: EOMI, PERRL  HEENT: OP clear  Respiratory: CTA B without w/c anteriorly  Cardiovascular: RRR with SANGEETHA, difficult exam in distress. No edema  GI: soft, obese, ventral hernia, no HSM  Lymph/Hematologic: no cervical LAD  Genitourinary: deferred  Skin: no rashes or lesions grossly  Musculoskeletal: no deformities or arthritis  Neurologic: CN II-XII, MEREDITH  Psychiatric: anxious    Data   Data reviewed today:  I personally reviewed no images or EKG's today.  Recent Labs   Lab 21  1758   WBC 12.4*   HGB 13.1   MCV 86          No results found for this or any previous visit (from the past 24 hour(s)).

## 2021-02-18 NOTE — PROGRESS NOTES
[facility]    Hospitalist Progress Note    Assessment & Plan   Moira Andre is a 87 year old female who presents with muscle spasms.  Spinal stenosis  Possible lumbar radiculopathy  Muscle spasms  ---Patient follows outpatient with the [facility], her pain is mostly located at the gluteal muscle area, possibly related to involvement of pyriformis.  Admitted due to uncontrolled severe pain restricting her movement.  ---MRI of lumbar spine pending, consult orthopedic surgery  ---Continue scheduled Tylenol, as needed oxycodone and IV hydromorphone available, Lidoderm patch applied to the back, continue muscle relaxants, avoid NSAID use due to kidney disease  ---PT/OT consulted.  Leukocytosis  ---Possibly stress related- improved, will obtain UA.  CAD with h/o stenting ~2006  Valvular heart disease (aortic stenosis, mitral stenosis)  H/o diastolic CHF  Hyperlipidemia  Hypertension  [atorvastatin 20 mg daily, plavix 75 mg daily, losartan 50 mg daily, metoprolol 100 mg daily, prn NTG]  Stenting 13 years ago. Angio 2017 with disease but not intervened. Last echo 11/2020 EF 60-65%, noted valvular abnormalities.   - continue meds, hold statin in obs  - hold ARB with mild TIFFANI     CKD  Baseline creatinine ~1.3-1.5 recently. Creatinine at the time of admission at 1.69.   - hold ARB  -Continue gentle hydration, creatinine slowly improving.     DM II  [glimepiride 2 mg daily]  Last A1C 6.2% 8/2020.   - continue glimepiride  - BID and HS blood sugars  - mod CHO diet     MDD  [bupropion 100 mg at bedtime, duloxetine 50 mg daily]  - continue meds     GERD  famotidine 20 mg at bedtime, continue     Asthma  [Wixela one puff BID, albuterol prn]  - hold Wixela in obs  - prn albuterol     ELIGIO  Continue home CPAP     Morbid obesity  Encourage weight loss and healthy lifestyle  DVT Prophylaxis: SCDs  Code Status: Full Code     Disposition: Expected discharge 2/19 possibly TCU    Alyse Callahan MD  Text  Page   (7am to 6pm)    Interval History   Patient is resting comfortably, has significant pain in the buttocks area, she also had pain radiating to her thighs, mostly on the right side, mentions that she has significant difficulty ambulating and getting out of bed due to this, visited with patient and she was working with PT.    -Data reviewed today: I reviewed all new labs and imaging results over the last 24 hours.     Physical Exam   Temp: 98.2  F (36.8  C) Temp src: Oral BP: (!) 163/70 Pulse: 71   Resp: 18 SpO2: 94 % O2 Device: None (Room air)    Vitals:    02/17/21 1545   Weight: 127.5 kg (281 lb)     Vital Signs with Ranges  Temp:  [97.7  F (36.5  C)-98.2  F (36.8  C)] 98.2  F (36.8  C)  Pulse:  [71-92] 71  Resp:  [16-22] 18  BP: (111-170)/(28-97) 163/70  SpO2:  [91 %-99 %] 94 %  No intake/output data recorded.    Constitutional: Awake, alert, cooperative, no apparent distress, obese  Respiratory: Clear to auscultation bilaterally, no crackles or wheezing  Cardiovascular: Regular rate and rhythm, normal S1 and S2, and no murmur noted  GI: Normal bowel sounds, soft, non-distended, non-tender  Skin/Integumen: No rashes, no cyanosis, no edema, gait not tested.  Neuro : moving all 4 extremities, no focal deficit noted     Medications     sodium chloride 75 mL/hr at 02/17/21 2355       acetaminophen  1,000 mg Oral 3 times daily     albuterol  2 puff Inhalation Q6H     buPROPion  100 mg Oral QPM     clopidogrel  75 mg Oral QPM     DULoxetine  60 mg Oral QPM     famotidine  20 mg Oral QPM     glimepiride  2 mg Oral QAM AC     lidocaine  1 patch Transdermal Q24h    And     lidocaine   Transdermal Q8H     lidocaine   Transdermal Q8H     metoprolol succinate ER  100 mg Oral QPM       Data   Recent Labs   Lab 02/18/21  0600 02/17/21  1758   WBC 10.3 12.4*   HGB 11.9 13.1   MCV 85 86    375    137   POTASSIUM 4.2 4.1   CHLORIDE 108 106   CO2 22 24   BUN 34* 38*   CR 1.50* 1.69*   ANIONGAP 6 7   TELLY 8.4* 9.4    * 82     Recent Labs   Lab 02/18/21  0600 02/18/21  0143 02/17/21  1758   *  --  82   BGM  --  125*  --        Imaging:   No results found for this or any previous visit (from the past 24 hour(s)).

## 2021-02-18 NOTE — PLAN OF CARE
PRIMARY DIAGNOSIS: ACUTE PAIN  OUTPATIENT/OBSERVATION GOALS TO BE MET BEFORE DISCHARGE:  1. Pain Status: Improved-controlled with oral pain medications.    2. Return to near baseline physical activity: No    3. Cleared for discharge by consultants (if involved): Yes, recommending TCU    Discharge Planner Nurse   Safe discharge environment identified: No  Barriers to discharge: Yes       Entered by: Shy Zazueta 02/18/2021 4:15 PM     Please review provider order for any additional goals.   Nurse to notify provider when observation goals have been met and patient is ready for discharge.

## 2021-02-18 NOTE — PLAN OF CARE
Observation Goals:    -diagnostic tests and consults completed and resulted: not met  -vital signs normal or at patient baseline: partially  -adequate pain control on oral analgesics: partially  -returns to baseline functional status: not met  -safe disposition plan has been identified: not met  Nurse to notify provider when observation goals have been met and patient is ready for discharge.        VSS ex high BP at times. A/Ox4. NS running @ 75. Has right leg spasms that are managed w/ dilaudid, zanaflex, and repositioning this shift. No tingling in fingers or toes, cap refill good. Lidocaine patch on right lower back. Plan for ortho surgery consult and PT consult. MRI of the lumbar and spine w/o contrast scheduled for today.

## 2021-02-19 LAB
BACTERIA SPEC CULT: NORMAL
GLUCOSE BLDC GLUCOMTR-MCNC: 123 MG/DL (ref 70–99)
GLUCOSE BLDC GLUCOMTR-MCNC: 167 MG/DL (ref 70–99)
GLUCOSE BLDC GLUCOMTR-MCNC: 217 MG/DL (ref 70–99)
SPECIMEN SOURCE: NORMAL

## 2021-02-19 PROCEDURE — 250N000013 HC RX MED GY IP 250 OP 250 PS 637: Performed by: INTERNAL MEDICINE

## 2021-02-19 PROCEDURE — G0378 HOSPITAL OBSERVATION PER HR: HCPCS

## 2021-02-19 PROCEDURE — 99226 PR SUBSEQUENT OBSERVATION CARE,LEVEL III: CPT | Performed by: INTERNAL MEDICINE

## 2021-02-19 PROCEDURE — 999N001017 HC STATISTIC GLUCOSE BY METER IP

## 2021-02-19 PROCEDURE — 99207 PR CDG-CODE CATEGORY CHANGED: CPT | Performed by: INTERNAL MEDICINE

## 2021-02-19 PROCEDURE — 250N000012 HC RX MED GY IP 250 OP 636 PS 637: Performed by: INTERNAL MEDICINE

## 2021-02-19 RX ORDER — BACLOFEN 5 MG/1
5 TABLET ORAL 3 TIMES DAILY PRN
DISCHARGE
Start: 2021-02-19 | End: 2021-02-21

## 2021-02-19 RX ORDER — METHYLPREDNISOLONE 4 MG/1
4 TABLET ORAL
Status: DISCONTINUED | OUTPATIENT
Start: 2021-02-20 | End: 2021-02-21 | Stop reason: HOSPADM

## 2021-02-19 RX ORDER — BUPROPION HYDROCHLORIDE 100 MG/1
100 TABLET, EXTENDED RELEASE ORAL EVERY EVENING
DISCHARGE
Start: 2021-02-19 | End: 2021-08-27

## 2021-02-19 RX ORDER — OXYCODONE HYDROCHLORIDE 5 MG/1
5 TABLET ORAL EVERY 6 HOURS PRN
Qty: 10 TABLET | Refills: 0 | Status: SHIPPED | OUTPATIENT
Start: 2021-02-19 | End: 2021-08-27

## 2021-02-19 RX ORDER — LIDOCAINE 4 G/G
1 PATCH TOPICAL EVERY 24 HOURS
DISCHARGE
Start: 2021-02-19 | End: 2023-04-12

## 2021-02-19 RX ORDER — METHYLPREDNISOLONE 4 MG/1
8 TABLET ORAL AT BEDTIME
Status: COMPLETED | OUTPATIENT
Start: 2021-02-19 | End: 2021-02-20

## 2021-02-19 RX ORDER — AMOXICILLIN 250 MG
1 CAPSULE ORAL 2 TIMES DAILY PRN
DISCHARGE
Start: 2021-02-19 | End: 2021-02-21

## 2021-02-19 RX ORDER — METHYLPREDNISOLONE 4 MG/1
4 TABLET ORAL
Status: COMPLETED | OUTPATIENT
Start: 2021-02-19 | End: 2021-02-19

## 2021-02-19 RX ORDER — METHYLPREDNISOLONE 4 MG
TABLET, DOSE PACK ORAL
Qty: 21 TABLET | Refills: 0 | DISCHARGE
Start: 2021-02-19 | End: 2021-02-21

## 2021-02-19 RX ORDER — ACETAMINOPHEN 500 MG
1000 TABLET ORAL 3 TIMES DAILY
DISCHARGE
Start: 2021-02-19 | End: 2023-04-12

## 2021-02-19 RX ORDER — METHYLPREDNISOLONE 4 MG/1
4 TABLET ORAL AT BEDTIME
Status: DISCONTINUED | OUTPATIENT
Start: 2021-02-21 | End: 2021-02-21 | Stop reason: HOSPADM

## 2021-02-19 RX ORDER — METHYLPREDNISOLONE 4 MG/1
8 TABLET ORAL ONCE
Status: COMPLETED | OUTPATIENT
Start: 2021-02-19 | End: 2021-02-19

## 2021-02-19 RX ORDER — METHYLPREDNISOLONE 4 MG/1
4 TABLET ORAL ONCE
Status: COMPLETED | OUTPATIENT
Start: 2021-02-19 | End: 2021-02-19

## 2021-02-19 RX ADMIN — ACETAMINOPHEN 1000 MG: 500 TABLET, FILM COATED ORAL at 07:37

## 2021-02-19 RX ADMIN — ACETAMINOPHEN 1000 MG: 500 TABLET, FILM COATED ORAL at 17:05

## 2021-02-19 RX ADMIN — METHYLPREDNISOLONE 8 MG: 4 TABLET ORAL at 14:18

## 2021-02-19 RX ADMIN — TIZANIDINE 2 MG: 2 TABLET ORAL at 07:37

## 2021-02-19 RX ADMIN — ALBUTEROL SULFATE 2 PUFF: 90 AEROSOL, METERED RESPIRATORY (INHALATION) at 14:06

## 2021-02-19 RX ADMIN — OXYCODONE HYDROCHLORIDE 5 MG: 5 TABLET ORAL at 12:04

## 2021-02-19 RX ADMIN — DULOXETINE HYDROCHLORIDE 60 MG: 30 CAPSULE, DELAYED RELEASE ORAL at 20:13

## 2021-02-19 RX ADMIN — CLOPIDOGREL BISULFATE 75 MG: 75 TABLET ORAL at 20:13

## 2021-02-19 RX ADMIN — BACLOFEN 5 MG: 10 TABLET ORAL at 07:37

## 2021-02-19 RX ADMIN — BACLOFEN 5 MG: 10 TABLET ORAL at 14:18

## 2021-02-19 RX ADMIN — OXYCODONE HYDROCHLORIDE 5 MG: 5 TABLET ORAL at 10:11

## 2021-02-19 RX ADMIN — BACLOFEN 5 MG: 10 TABLET ORAL at 20:13

## 2021-02-19 RX ADMIN — FAMOTIDINE 20 MG: 20 TABLET ORAL at 20:13

## 2021-02-19 RX ADMIN — METOPROLOL SUCCINATE 100 MG: 100 TABLET, EXTENDED RELEASE ORAL at 20:13

## 2021-02-19 RX ADMIN — METHYLPREDNISOLONE 4 MG: 4 TABLET ORAL at 17:06

## 2021-02-19 RX ADMIN — OXYCODONE HYDROCHLORIDE 5 MG: 5 TABLET ORAL at 03:51

## 2021-02-19 RX ADMIN — METHYLPREDNISOLONE 4 MG: 4 TABLET ORAL at 14:18

## 2021-02-19 RX ADMIN — METHYLPREDNISOLONE 8 MG: 4 TABLET ORAL at 22:54

## 2021-02-19 RX ADMIN — ALBUTEROL SULFATE 2 PUFF: 90 AEROSOL, METERED RESPIRATORY (INHALATION) at 20:13

## 2021-02-19 RX ADMIN — BUPROPION HYDROCHLORIDE 100 MG: 100 TABLET, FILM COATED, EXTENDED RELEASE ORAL at 20:13

## 2021-02-19 RX ADMIN — OXYCODONE HYDROCHLORIDE 5 MG: 5 TABLET ORAL at 19:08

## 2021-02-19 RX ADMIN — LIDOCAINE 1 PATCH: 560 PATCH PERCUTANEOUS; TOPICAL; TRANSDERMAL at 20:13

## 2021-02-19 RX ADMIN — ALBUTEROL SULFATE 2 PUFF: 90 AEROSOL, METERED RESPIRATORY (INHALATION) at 07:37

## 2021-02-19 NOTE — PLAN OF CARE
Pt is A&Ox4, VSS on RA, Mod cho diet, Assist of 1 w/ personal walker, incont of B&B, IV SL, Reports of Right lower back pain, managed with prn oxy x2, schedule tylenol, and muscle relaxer, TCU placement conformed, dchrg pending. Continue to monitor

## 2021-02-19 NOTE — PROGRESS NOTES
Observation goals  -diagnostic tests and consults completed and resulted -yes  -vital signs normal or at patient baseline-yes   -adequate pain control on oral analgesics -yes  -returns to baseline functional status -no  -safe disposition plan has been identified -no

## 2021-02-19 NOTE — PROGRESS NOTES
Care Management Discharge Note    Discharge Date: 02/19/21(TCU)       Discharge Disposition: Skilled Nursing [facility]  Discharge Services:  Rehab    Discharge DME:      Discharge Transportation: [facility]  Private pay costs discussed: transportation costs and insurance costs co-pays    PAS Confirmation Code:    Patient/family educated on Medicare website which has current facility and service quality ratings: yes    Education Provided on the Discharge Plan:  Yes  Persons Notified of Discharge Plans: pt, family, medical team, TCU  Patient/Family in Agreement with the Plan: yes    Handoff Referral Completed: Yes    Additional Information:  Pt to discharge to [facility] once [employer] auth obtained.  [facility] to transport  PAS complete.    SW to continue to follow and assist with discharge planning.    STEF Dunn  Daytime (8:00am-4:30pm): [phone]  After-Hours SW Pager (4:30pm-11:30pm): [phone]             STEF Sepulveda

## 2021-02-19 NOTE — PROGRESS NOTES
VSS. A/O. Up with assist of 1 to [facility]SC. PT recommending TCU. RLE spasms controlled best with PRN Zanaflex.

## 2021-02-19 NOTE — PROGRESS NOTES
[facility]    Hospitalist Progress Note    Assessment & Plan   Moira Andre is a 87 year old female who presents with muscle spasms.  Spinal stenosis  Possible lumbar radiculopathy  Muscle spasms  ---Patient follows outpatient with the [facility], her pain is mostly located at the gluteal muscle area, possibly related to involvement of pyriformis.  Admitted due to uncontrolled severe pain restricting her movement.  ---MRI of lumbar spine noted,  She  has significant neural foraminal narrowing as well as spinal stenosis at the lower lumbar vertebrae.  ---Continue scheduled Tylenol, as needed oxycodone and IV hydromorphone available, Lidoderm patch applied to the back, continue muscle relaxants, avoid NSAID use due to kidney disease  ---PT/OT consulted.  Patient would need her TCU, authorization pending, started on Medrol Dosepak 2/19/2021.  Leukocytosis  --- Resolved, UA is abnormal, pending urine culture, antibiotics not started, monitor urine cultures.  CAD with h/o stenting ~2006  Valvular heart disease (aortic stenosis, mitral stenosis)  H/o diastolic CHF  Hyperlipidemia  Hypertension  [atorvastatin 20 mg daily, plavix 75 mg daily, losartan 50 mg daily, metoprolol 100 mg daily, prn NTG]  Stenting 13 years ago. Angio 2017 with disease but not intervened. Last echo 11/2020 EF 60-65%, noted valvular abnormalities.   - continue meds, hold statin in obs  - hold ARB with mild TIFFANI, BMP ordered for a.m.     CKD  Baseline creatinine ~1.3-1.5 recently. Creatinine at the time of admission at 1.69.   - hold ARB  -Continue gentle hydration, creatinine slowly improving.     DM II  [glimepiride 2 mg daily]  Last A1C 6.2% 8/2020.   - continue glimepiride  - BID and HS blood sugars  - mod CHO diet     MDD  [bupropion 100 mg at bedtime, duloxetine 50 mg daily]  - continue meds     GERD  famotidine 20 mg at bedtime, continue     Asthma  [Wixela one puff BID, albuterol prn]  - hold Wixela in obs  -  prn albuterol     ELIGIO  Continue home CPAP     Morbid obesity  Encourage weight loss and healthy lifestyle  DVT Prophylaxis: SCDs  Code Status: Full Code     Disposition: Expected discharge 2/20 depending on availability of TCU bed.    Alyse Callahan MD  Text Page   (7am to 6pm)    Interval History   Patient continues to have a significant muscle spasm and back pain, mostly shooting pain to her right thigh with the muscle spasms, she is quite tearful at times, recommended to assess patient every 3-4 hours and provide pain medications as needed, patient was under the impression that she will get scheduled pain medications, this was discussed in detail with patient as well as nursing, continue PT/OT, recommended to nursing to avoid IV narcotics unless patient is in severe pain.  -Data reviewed today: I reviewed all new labs and imaging results over the last 24 hours.     Physical Exam   Temp: 96.3  F (35.7  C) Temp src: Oral BP: 136/64 Pulse: 64   Resp: 18 SpO2: 96 % O2 Device: None (Room air)    Vitals:    02/17/21 1545   Weight: 127.5 kg (281 lb)     Vital Signs with Ranges  Temp:  [95.8  F (35.4  C)-98.7  F (37.1  C)] 96.3  F (35.7  C)  Pulse:  [64-76] 64  Resp:  [17-21] 18  BP: (131-146)/(49-69) 136/64  SpO2:  [91 %-96 %] 96 %  I/O last 3 completed shifts:  In: 1087 [I.V.:1087]  Out: 100 [Urine:100]    Constitutional: Awake, alert, cooperative, no apparent distress, obese  Respiratory: Clear to auscultation bilaterally, no crackles or wheezing  Cardiovascular: Regular rate and rhythm, normal S1 and S2, and no murmur noted  GI: Normal bowel sounds, soft, non-distended, non-tender  Skin/Integumen: No rashes, no cyanosis, no edema, gait not tested.  Neuro : moving all 4 extremities, straight leg raising test positive bilaterally.    Medications       acetaminophen  1,000 mg Oral 3 times daily     albuterol  2 puff Inhalation Q6H     Baclofen  5 mg Oral TID     buPROPion  100 mg Oral QPM     clopidogrel  75 mg Oral  QPM     DULoxetine  60 mg Oral QPM     famotidine  20 mg Oral QPM     glimepiride  2 mg Oral QAM AC     lidocaine  1 patch Transdermal Q24h    And     lidocaine   Transdermal Q8H     methylPREDNISolone  8 mg Oral Once    And     methylPREDNISolone  4 mg Oral Once    And     methylPREDNISolone  4 mg Oral Once    And     methylPREDNISolone  8 mg Oral At Bedtime    And     [START ON 2/20/2021] methylPREDNISolone  4 mg Oral QAM AC    And     [START ON 2/20/2021] methylPREDNISolone  4 mg Oral Daily with lunch    And     [START ON 2/20/2021] methylPREDNISolone  4 mg Oral Daily with supper    And     [START ON 2/21/2021] methylPREDNISolone  4 mg Oral At Bedtime     metoprolol succinate ER  100 mg Oral QPM       Data   Recent Labs   Lab 02/18/21  0600 02/17/21  1758   WBC 10.3 12.4*   HGB 11.9 13.1   MCV 85 86    375    137   POTASSIUM 4.2 4.1   CHLORIDE 108 106   CO2 22 24   BUN 34* 38*   CR 1.50* 1.69*   ANIONGAP 6 7   TELLY 8.4* 9.4   * 82     Recent Labs   Lab 02/19/21  0752 02/18/21  2105 02/18/21  1350 02/18/21  0600 02/18/21  0143 02/17/21  1758   GLC  --   --   --  136*  --  82   * 109* 124*  --  125*  --        Imaging:   Recent Results (from the past 24 hour(s))   MR Lumbar Spine w/o Contrast    Narrative    MRI OF THE LUMBAR SPINE WITHOUT CONTRAST February 18, 2021 1:31 PM     HISTORY: Lumbar radiculopathy, osteoporosis presence or risk.    TECHNIQUE: Multiplanar, multisequence MRI images of the lumbar spine  were acquired without IV contrast.    COMPARISON: None.    FINDINGS: There are five lumbar-type vertebrae for the purposes of  this dictation.     There is grade 1 degenerative anterolisthesis of L5 upon S1. Alignment  of the lumbar vertebrae is otherwise normal. Vertebral body heights of  the lumbar spine are normal. There is Modic 1 reactive, degenerative  marrow signal change in the opposing endplates at the L1-L2 level;  marrow signal throughout the lumbar vertebrae is otherwise  normal. No  evidence for fracture or pathologic bony lesion of the lumbar spine.    There is loss of disc height, disc desiccation and posterior disc  bulging/herniation to varying degrees at all levels of the lumbar  spine.    The tip of the conus medullaris is at the L1-L2 level which is within  normal limits. There is no evidence for intrathecal abnormality.     Level by level:     T12-L1: Loss of disc height, disc desiccation and mild circumferential  disc bulging. No herniation. Moderate facet arthropathy bilaterally.  No spinal canal stenosis. Mild bilateral neural foraminal narrowing.    L1-L2: Loss of disc height, disc desiccation and mild circumferential  disc bulging. No herniation. Moderate facet arthropathy bilaterally.  Mild spinal canal narrowing. Moderate left foraminal stenosis. Mild  right foraminal narrowing.    L2-L3: Loss of disc height, disc desiccation and moderate  circumferential disc bulging. No herniation. Severe facet arthropathy  bilaterally. Ligamentum flavum thickening bilaterally. Moderate spinal  canal stenosis. Mild left foraminal narrowing. Mild right foraminal  narrowing.    L3-L4: Loss of disc height, disc desiccation and moderate  circumferential disc bulging. No herniation. Circumferential endplate  spurring. Moderate facet arthropathy bilaterally. Ligamentum flavum  thickening bilaterally. Moderate spinal canal stenosis. Mild bilateral  neural foraminal narrowing.    L4-L5: Loss of disc height, disc desiccation and moderate  circumferential disc bulging. No herniation. Circumferential endplate  spurring. Moderate facet arthropathy bilaterally. Ligamentum flavum  thickening bilaterally. Moderate spinal canal stenosis.  Moderate-severe right foraminal stenosis. Moderate left foraminal  stenosis.    L5-S1: Loss of disc height, disc desiccation and mild circumferential  disc bulging. No herniation. Severe facet arthropathy bilaterally.  Severe thickening of the ligamentum flavum  bilaterally. Severe spinal  canal stenosis. Moderate right foraminal stenosis. Severe left  foraminal stenosis.      Impression    IMPRESSION:  1. Diffuse degenerative changes of the lumbar spine as detailed above.  2. Moderate-severe spinal canal stenosis at L2-L3, L3-L4, L4-L5 and  L5-S1.  3. Moderate-severe neural foraminal stenosis on the left at L1-L2,  bilaterally at L4-L5 and bilaterally at L5-S1.    JUAN LUIS CUNNINGHAM MD

## 2021-02-19 NOTE — PROGRESS NOTES
Observation Goals  -diagnostic tests and consults completed and resulted -yes  -vital signs normal or at patient baseline -yes  -adequate pain control on oral analgesics -no  -returns to baseline functional status -no  -safe disposition plan has been identified-no  Pt A&O, VSS, pt continues with pain in back and down leg.  Pt up with assistance of 1 to bedside commode.  Will continue to monitor.

## 2021-02-19 NOTE — PROGRESS NOTES
Observation goals  -diagnostic tests and consults completed and resulted -yes  -vital signs normal or at patient baseline-yes   -adequate pain control on oral analgesics -yes  -returns to baseline functional status -no  -safe disposition plan has been identified -no  Pt A&O, VSS, pt continues to have pain on left lower back and pain radiating down left leg.  Pt given pain medication which allowed her to get rest this shift.  Pt did ambulate using walker to bathroom, pt moved slow and stating that pain was present, she was successful.  Will continue to monitor.

## 2021-02-19 NOTE — CONSULTS
Care Management Initial Consult    General Information  Assessment completed with: Kristyn Gamble  Type of CM/SW Visit: Initial Assessment    Primary Care Provider verified and updated as needed: Yes   Readmission within the last 30 days:        Reason for Consult: discharge planning  Advance Care Planning:            Communication Assessment  Patient's communication style: spoken language (English or Bilingual)    Hearing Difficulty or Deaf: no   Wear Glasses or Blind: yes    Cognitive  Cognitive/Neuro/Behavioral: WDL  Level of Consciousness: lethargic                   Living Environment:   People in home: alone     Current living Arrangements: apartment, independent living facility      Able to return to prior arrangements: yes       Family/Social Support:  Care provided by:  No one  Provides care for:  self  Marital Status: Single  Children          Description of Support System: Supportive, Involved         Current Resources:   Patient receiving home care services: No     Community Resources:    Equipment currently used at home: walker, rolling(4ww)  Supplies currently used at home:      Employment/Financial:  Employment Status:          Financial Concerns:             Lifestyle & Psychosocial Needs:        Socioeconomic History     Marital status:      Spouse name: Not on file     Number of children: Not on file     Years of education: Not on file     Highest education level: Not on file     Tobacco Use     Smoking status: Former Smoker     Packs/day: 0.25     Types: Cigarettes     Start date:      Quit date: 1973     Years since quittin.8     Smokeless tobacco: Never Used   Substance and Sexual Activity     Alcohol use: Yes     Alcohol/week: 0.0 - 1.0 standard drinks     Comment: rarely     Drug use: No     Sexual activity: Not Currently     Partners: Male       Functional Status:  Prior to admission patient needed assistance:              Mental Health Status:          Chemical  Dependency Status:                Values/Beliefs:  Spiritual, Cultural Beliefs, Anabaptism Practices, Values that affect care:                 Additional Information:  SW consulted to assist with discharge planning, emotional support and transportation arrangements.  SW reviewed patient chart, discussed in hospital rounds and met face to face with patient.  Pt is an 87 yr old single female who admitted for back muscle spasms. Pt previously lived alone independently. Pt in agreement with therapy recommendations for TCU. SW reviewed [facility] TCU list with patient, obtained choices and sent TCU referrals via DOD. Patient hoping to find a TCU close to home so that it is not so far for her DIL to bring things for her.    SW to continue to follow and assist with discharge planning.    STEF Dunn  Daytime (8:00am-4:30pm): [phone]  After-Hours SW Pager (4:30pm-11:30pm): [phone]         STEF Sepulveda

## 2021-02-19 NOTE — PLAN OF CARE
OT: Orders received. Chart reviewed and discussed with care team.  OT not indicated as patient discharging to TCU. Will defer initiation of OT services to next setting..  Defer discharge recommendations to TCU.  Will complete orders.

## 2021-02-19 NOTE — PROGRESS NOTES
Care Management Follow Up    Length of Stay (days): 0    Expected Discharge Date: 02/19/21(TCU)     Concerns to be Addressed: discharge planning     Patient plan of care discussed at interdisciplinary rounds: Yes    Anticipated Discharge Disposition: Skilled Nursing Facilty     Anticipated Discharge Services:  Rehab  Anticipated Discharge DME:      Patient/family educated on Medicare website which has current facility and service quality ratings: yes  Education Provided on the Discharge Plan:  yes  Patient/Family in Agreement with the Plan: yes    Referrals Placed by CM/SW: Senior Linkage Line, Post Acute Facilities, Transportation  Private pay costs discussed: private room/amenity fees and transportation costs    Additional Information:  YANIQUE received VM from Armida with [facility], stating they do not have a bed avail at [facility] but do at [facility].  SW received TC from Ari at [facility] who states they have concerns about [employer] Ins for this patient as [employer] has a history of providing a 3 day auth and then if patient is not making significant progress cuts them off. Adm's person Ari questioning if patient will need LTC.    ADDENDUM: YANIQUE discussed options with patient who ultimately chose [facility]. Armida with the [facility] will submit insurance auth. If not received by 1630 today, patient will stay the night and can admit tomorrow, Sat 2/20.    YANIQUE spoke to patient's DIL Latia, with patient's permission to discuss discharge planning. Latia in agreement with plan and willing to transport patient to TCU once [employer] auth obtained.    SW spoke to patient about future planning, should patient need additional support in the future. Patient states she has thought about jail's but really prefers to stay in her apt where she feels safe and has caring neighbors. Per DIL, patient's apt is small and cluttered and patient has demonstrated difficulty getting around it and basically does not mobilize much. SW provided Sr.  Housing Guide, suggesting that patient think about the 'big picture' and do some proactive planning so that has more choices vs having limited choices in a crisis situation. SW also suggested that she review her Health Care Directive and assuring that someone other than her friend is listed as a decision maker, as patient's friend in the process of dying of cancer. Patient stated understanding.      SW to continue to follow and assist with discharge planning.    STEF Dunn  Daytime (8:00am-4:30pm): [phone]  After-Hours SW Pager (4:30pm-11:30pm): [phone]           STEF Sepulveda

## 2021-02-20 LAB
GLUCOSE BLDC GLUCOMTR-MCNC: 127 MG/DL (ref 70–99)
GLUCOSE BLDC GLUCOMTR-MCNC: 138 MG/DL (ref 70–99)
GLUCOSE BLDC GLUCOMTR-MCNC: 181 MG/DL (ref 70–99)
GLUCOSE BLDC GLUCOMTR-MCNC: 182 MG/DL (ref 70–99)
GLUCOSE BLDC GLUCOMTR-MCNC: 188 MG/DL (ref 70–99)
GLUCOSE BLDC GLUCOMTR-MCNC: 87 MG/DL (ref 70–99)

## 2021-02-20 PROCEDURE — 250N000013 HC RX MED GY IP 250 OP 250 PS 637: Performed by: INTERNAL MEDICINE

## 2021-02-20 PROCEDURE — 250N000013 HC RX MED GY IP 250 OP 250 PS 637: Performed by: PHYSICIAN ASSISTANT

## 2021-02-20 PROCEDURE — 250N000012 HC RX MED GY IP 250 OP 636 PS 637: Performed by: INTERNAL MEDICINE

## 2021-02-20 PROCEDURE — 250N000011 HC RX IP 250 OP 636: Performed by: INTERNAL MEDICINE

## 2021-02-20 PROCEDURE — 96375 TX/PRO/DX INJ NEW DRUG ADDON: CPT

## 2021-02-20 PROCEDURE — G0378 HOSPITAL OBSERVATION PER HR: HCPCS

## 2021-02-20 PROCEDURE — 99225 PR SUBSEQUENT OBSERVATION CARE,LEVEL II: CPT | Performed by: PHYSICIAN ASSISTANT

## 2021-02-20 PROCEDURE — 999N001017 HC STATISTIC GLUCOSE BY METER IP

## 2021-02-20 RX ORDER — BISACODYL 10 MG
10 SUPPOSITORY, RECTAL RECTAL DAILY PRN
Status: DISCONTINUED | OUTPATIENT
Start: 2021-02-20 | End: 2021-02-21 | Stop reason: HOSPADM

## 2021-02-20 RX ORDER — OXYCODONE HYDROCHLORIDE 5 MG/1
5 TABLET ORAL EVERY 4 HOURS PRN
Status: DISCONTINUED | OUTPATIENT
Start: 2021-02-20 | End: 2021-02-21 | Stop reason: HOSPADM

## 2021-02-20 RX ORDER — LOSARTAN POTASSIUM 50 MG/1
50 TABLET ORAL DAILY
Status: DISCONTINUED | OUTPATIENT
Start: 2021-02-20 | End: 2021-02-21 | Stop reason: HOSPADM

## 2021-02-20 RX ORDER — POLYETHYLENE GLYCOL 3350 17 G/17G
17 POWDER, FOR SOLUTION ORAL 2 TIMES DAILY
Status: DISCONTINUED | OUTPATIENT
Start: 2021-02-20 | End: 2021-02-21 | Stop reason: HOSPADM

## 2021-02-20 RX ORDER — BISACODYL 10 MG
10 SUPPOSITORY, RECTAL RECTAL ONCE
Status: COMPLETED | OUTPATIENT
Start: 2021-02-20 | End: 2021-02-20

## 2021-02-20 RX ADMIN — METHYLPREDNISOLONE 4 MG: 4 TABLET ORAL at 16:28

## 2021-02-20 RX ADMIN — METHYLPREDNISOLONE 8 MG: 4 TABLET ORAL at 22:43

## 2021-02-20 RX ADMIN — POLYETHYLENE GLYCOL 3350 17 G: 17 POWDER, FOR SOLUTION ORAL at 09:30

## 2021-02-20 RX ADMIN — METHYLPREDNISOLONE 4 MG: 4 TABLET ORAL at 13:22

## 2021-02-20 RX ADMIN — BISACODYL 10 MG: 10 SUPPOSITORY RECTAL at 13:35

## 2021-02-20 RX ADMIN — LOSARTAN POTASSIUM 50 MG: 50 TABLET, FILM COATED ORAL at 16:28

## 2021-02-20 RX ADMIN — BACLOFEN 5 MG: 10 TABLET ORAL at 20:50

## 2021-02-20 RX ADMIN — ALBUTEROL SULFATE 2 PUFF: 90 AEROSOL, METERED RESPIRATORY (INHALATION) at 09:37

## 2021-02-20 RX ADMIN — POLYETHYLENE GLYCOL 3350 17 G: 17 POWDER, FOR SOLUTION ORAL at 20:49

## 2021-02-20 RX ADMIN — METHYLPREDNISOLONE 4 MG: 4 TABLET ORAL at 09:26

## 2021-02-20 RX ADMIN — BACLOFEN 5 MG: 10 TABLET ORAL at 13:35

## 2021-02-20 RX ADMIN — ALBUTEROL SULFATE 2 PUFF: 90 AEROSOL, METERED RESPIRATORY (INHALATION) at 01:08

## 2021-02-20 RX ADMIN — OXYCODONE HYDROCHLORIDE 5 MG: 5 TABLET ORAL at 04:43

## 2021-02-20 RX ADMIN — METOPROLOL SUCCINATE 100 MG: 100 TABLET, EXTENDED RELEASE ORAL at 20:50

## 2021-02-20 RX ADMIN — OXYCODONE HYDROCHLORIDE 5 MG: 5 TABLET ORAL at 05:41

## 2021-02-20 RX ADMIN — TIZANIDINE 2 MG: 2 TABLET ORAL at 05:41

## 2021-02-20 RX ADMIN — ONDANSETRON 4 MG: 2 INJECTION INTRAMUSCULAR; INTRAVENOUS at 06:06

## 2021-02-20 RX ADMIN — LIDOCAINE 1 PATCH: 560 PATCH PERCUTANEOUS; TOPICAL; TRANSDERMAL at 20:49

## 2021-02-20 RX ADMIN — ACETAMINOPHEN 1000 MG: 500 TABLET, FILM COATED ORAL at 16:28

## 2021-02-20 RX ADMIN — ALBUTEROL SULFATE 2 PUFF: 90 AEROSOL, METERED RESPIRATORY (INHALATION) at 20:56

## 2021-02-20 RX ADMIN — CLOPIDOGREL BISULFATE 75 MG: 75 TABLET ORAL at 20:49

## 2021-02-20 RX ADMIN — FAMOTIDINE 20 MG: 20 TABLET ORAL at 20:50

## 2021-02-20 RX ADMIN — OXYCODONE HYDROCHLORIDE 5 MG: 5 TABLET ORAL at 17:52

## 2021-02-20 RX ADMIN — BACLOFEN 5 MG: 10 TABLET ORAL at 09:26

## 2021-02-20 RX ADMIN — BUPROPION HYDROCHLORIDE 100 MG: 100 TABLET, FILM COATED, EXTENDED RELEASE ORAL at 20:49

## 2021-02-20 RX ADMIN — BISACODYL 10 MG: 10 SUPPOSITORY RECTAL at 13:45

## 2021-02-20 RX ADMIN — DULOXETINE HYDROCHLORIDE 60 MG: 30 CAPSULE, DELAYED RELEASE ORAL at 20:50

## 2021-02-20 RX ADMIN — ACETAMINOPHEN 1000 MG: 500 TABLET, FILM COATED ORAL at 01:01

## 2021-02-20 RX ADMIN — ACETAMINOPHEN 1000 MG: 500 TABLET, FILM COATED ORAL at 09:25

## 2021-02-20 NOTE — PLAN OF CARE
A&Ox4. Slight HTN, all other VSS on home CPAP. Up Ax1 w/ GB and walker. Incontinent of urine, pt reports pain with walking/moving in bed. Scheduled Tylenol, PRN Oxycodone given x2, PRN Zanaflex given for pain. PRN Zofran given for nausea. Purewick placed for patient comfort. Mod cho diet. BG was 181. IV-SL. Reports SOB when walking. Denies dizziness, and lightheadedness. Plan to discharge to TCU later today.

## 2021-02-20 NOTE — PROVIDER NOTIFICATION
Hospitalist X-Cover    Unable to take glimepiride but our stock has yellow dye which patient is allergic to. Ok to take own stock if verified by pharmacy.  On steroids currently, HbA1c 6.2% very well controlled.   - add sliding scale insulin low dose, increase as needed    Recent Labs   Lab 02/19/21  2104 02/19/21  1704 02/19/21  0752 02/18/21  2105 02/18/21  1350 02/18/21  0600 02/18/21  0143 02/17/21  1758   GLC  --   --   --   --   --  136*  --  82   * 167* 123* 109* 124*  --  125*  --        Danika Erickson), PA-C  Hospitalist [facility]

## 2021-02-20 NOTE — PROGRESS NOTES
Observation goals PRIOR TO DISCHARGE     Comments:   -diagnostic tests and consults completed and resulted: met   -vital signs normal or at patient baseline: met   -adequate pain control on oral analgesics: met   -returns to baseline functional status: partially met   -safe disposition plan has been identified: met     Nurse to notify provider when observation goals have been met and patient is ready for discharge.

## 2021-02-20 NOTE — PROGRESS NOTES
[facility]    Medicine History and Physical - Hospitalist Service       Date of Admission:  2/17/2021    Assessment & Plan   Moira Andre is a 87 year old female admitted on 2/17/2021. PMHx ELIGIO, depression/anxiety, CKD, HTN, dCHF, CAD, and DM2 who presented with acute on chronic low back pain.    Acute on chronic back pain from lumbar radiculopathy, spinal stenosis, and muscle spasms.  Complains spasms down R hip and leg. Follows outpatient with [facility] pain clinic. No red flag sxs. Treated with fentanyl, oxycodone, and valium with ongoing severe pain in ED. MRI L-spine grade I L5-S1 spondylolisthesis, L2-S1 moderate to severe spinal canal stenosis. Ortho consulted, patient not interested in surgical intervention and prefers ongoing conservative mngt hoping to continue care with [facility].  [PTA: APAP 500mg TID, baclofen 5mg TID PRN, oxycodone 5mg Q6H PRN, Zanaflex 2mg TID PRN]  - Scheduled pain regimen:   - APAP 1000mg TID, baclofen 5mg TID, Lidocaine patch, Medrol dosepak.  - PRN pain regimen:   - PTA Zanaflex.   - Decrease oxycodone dose/frequency to 5mg Q4H PRN. Hold parameters for excess sedation or decreased RR.   - Discontinue IV Dilaudid (none x 24h) and ketamine.  - Avoid NSAIDs due to kidney disease.  - PT rec TCU, patient agreeable. Awaiting [employer] prior auth.    Constipation, likely narcotic induced.  - Discontinue Senokot-s due to yellow dye allx (though noted to be on her PTA med list).  - Schedule Miralax BID.  - Give Doculax suppository today, PRNs avail thereafter.    Leukocytosis.  Mild measuring 12.4 on adm and resolved without intervention aside from pain control. No sxs infection aside from abnl UA. Subsequent urine ctx growing urogenital tyler. No abx started.    CAD s/p bare metal stenting to OM1 and diagonal (~2006).  Valvular heart disease (aortic stenosis, mitral stenosis).  Chronic diastolic CHF, not in exacerbation.  Hyperlipidemia.  Hypertension.  [atorvastatin 20 mg  daily, plavix 75 mg daily, losartan 50 mg daily, metoprolol 100 mg daily, prn NTG]  Last echo 11/2020 EF 60-65%, noted valvular abnormalities.  - Continue PTA meds, resume statin at obs discharge.  - Resume ARB.     CKD, 3b.  Baseline creatinine ~1.3-1.5 recently. Creatinine at the time of admission at 1.69. ARB held and treated with gentle fluids.  - Resume ARB.  - BMP in 1 week (~2/27).     DM II. A1C 6.2% 8/2020.   - Cont PTA glimepiride 2mg daily using her home supply d/t yellow dye allx.  - BID and HS blood sugars  - Mod CHO diet     MDD.  - Cont PTA bupropion 100 mg at bedtime, duloxetine 50 mg daily     GERD.  - PTA famotidine 20 mg at bedtime     Asthma.  [Wixela one puff BID, albuterol prn]  - hold Wixela in obs  - prn albuterol     ELIGIO.  Continue home CPAP     Morbid obesity.  Contributing to all cause morbidity and mortality.    Diet: Moderate Consistent CHO Diet  Advance Diet as Tolerated    Charles Catheter: not present    DVT Prophylaxis: Ambulate every shift.  Code Status: Full Code    Expected discharge: to TCU once [employer] prior auth approved .  The patient's care was discussed with the Attending Physician, Dr. Head, Bedside Nurse and Patient.    JoAnna K. Barthell, PA-C  Hospitalist Service  [facility]    ______________________________________________________________________    Interval History   Pain overall improving compared to adm. Last BM 3 days ago. Drowsy.    Data reviewed today: I reviewed all medications, new labs and imaging results over the last 24 hours. I personally reviewed no images or EKG's today.    Physical Exam   Vital Signs: Temp: 96.1  F (35.6  C) Temp src: Oral BP: 121/48 Pulse: 64   Resp: 18 SpO2: 90 % O2 Device: None (Room air)    Weight: 281 lbs 0 oz  Constitutional: Appears stated age, no acute distress. Falls asleep frequently, but awakens easily. Oriented to reason for hospitalization, but believes Carranza is president and is year 1991 (this after  just waking).  Respiratory: Breath sounds distant, but clear. No increased work of breathing.  Cardiovascular: RRR, no rub or murmur appreciated. No peripheral edema.  GI: Soft, morbid obese, non-tender, non-distended. Large ventral hernia.  Skin: Warm, dry, no rashes or lesions.    Medications       acetaminophen  1,000 mg Oral 3 times daily     albuterol  2 puff Inhalation Q6H     Baclofen  5 mg Oral TID     buPROPion  100 mg Oral QPM     clopidogrel  75 mg Oral QPM     DULoxetine  60 mg Oral QPM     famotidine  20 mg Oral QPM     glimepiride  2 mg Oral QAM AC     insulin aspart  1-3 Units Subcutaneous TID AC     insulin aspart  1-3 Units Subcutaneous At Bedtime     lidocaine  1 patch Transdermal Q24h    And     lidocaine   Transdermal Q8H     methylPREDNISolone  8 mg Oral At Bedtime    And     methylPREDNISolone  4 mg Oral QAM AC    And     methylPREDNISolone  4 mg Oral Daily with lunch    And     methylPREDNISolone  4 mg Oral Daily with supper    And     [START ON 2/21/2021] methylPREDNISolone  4 mg Oral At Bedtime     metoprolol succinate ER  100 mg Oral QPM       Data   Recent Labs   Lab 02/18/21  0600 02/17/21  1758   WBC 10.3 12.4*   HGB 11.9 13.1   MCV 85 86    375    137   POTASSIUM 4.2 4.1   CHLORIDE 108 106   CO2 22 24   BUN 34* 38*   CR 1.50* 1.69*   ANIONGAP 6 7   TELLY 8.4* 9.4   * 82       Imaging:  No results found for this or any previous visit (from the past 24 hour(s)).

## 2021-02-20 NOTE — PROVIDER NOTIFICATION
MD Notification    Notified Person: MD    Notified Person Name:KATHLEEN Guthrie    Notification Date/Time: 2113 2/19/21    Notification Interaction:web page    Purpose of Notification:  FYI bedtime BG is 217, pt has not been given Amaryl for the past 2 mornings due to allergy to yellow dye in med. thanks     Orders Received: insulin sliding scale ordered     Comments:

## 2021-02-20 NOTE — PLAN OF CARE
Observation goals PRIOR TO DISCHARGE     Comments:   -diagnostic tests and consults completed and resulted: not met pt still has to see.   -vital signs normal or at patient baseline: met   -adequate pain control on oral analgesics: met but sedated and sleepy all day.   -returns to baseline functional status: not met   -safe disposition plan has been identified: not met   Confused. Awakens to name but falls back to  Sleep. Appetite poor/ falls asleep. Cooperative. No complaint of pain. Incontinent of  Urine.  Nurse to notify provider when observation goals have been met and patient is ready for discharge.

## 2021-02-20 NOTE — PROGRESS NOTES
Care Management Follow Up    Length of Stay (days): 0    Expected Discharge Date: 02/20/21     Concerns to be Addressed: discharge planning     Patient plan of care discussed at interdisciplinary rounds: Yes    Anticipated Discharge Disposition: Transitional Care, Skilled Nursing Facilty  Disposition Comments: [facility].  Anticipated Discharge Services: None  Anticipated Discharge DME: None    Patient/family educated on Medicare website which has current facility and service quality ratings: yes  Education Provided on the Discharge Plan:    Patient/Family in Agreement with the Plan: yes    Referrals Placed by CM/SW: Senior Linkage Line, Post Acute Facilities  Private pay costs discussed: Not applicable    Additional Information:  Writer called [facility]iaison who stated they do not yet have [employer] Auth. They stated that they are going to call [employer] because they do have staff that work on Saturdays.    STEF Culver

## 2021-02-20 NOTE — PLAN OF CARE
A/Ox 4. VSS on room air. Assist of 1 with GB and walker (from home). Incontinent at times. IV SL. Mod carb diet, on insulin scale, , 217. Lower back pain, radiates to right side. PRN Oxycodone and scheduled tylenol. Lidocaine patch in place, lower right back. CPAP from home in room. PT /OT following. Plan to discharge to TCU tomorrow, awaiting Insurance auth.

## 2021-02-21 VITALS
DIASTOLIC BLOOD PRESSURE: 78 MMHG | TEMPERATURE: 97 F | BODY MASS INDEX: 44.1 KG/M2 | HEIGHT: 67 IN | SYSTOLIC BLOOD PRESSURE: 162 MMHG | WEIGHT: 281 LBS | OXYGEN SATURATION: 93 % | RESPIRATION RATE: 20 BRPM | HEART RATE: 62 BPM

## 2021-02-21 LAB — GLUCOSE BLDC GLUCOMTR-MCNC: 127 MG/DL (ref 70–99)

## 2021-02-21 PROCEDURE — 99207 PR CDG-CODE CATEGORY CHANGED: CPT | Performed by: PHYSICIAN ASSISTANT

## 2021-02-21 PROCEDURE — 99217 PR OBSERVATION CARE DISCHARGE: CPT | Performed by: PHYSICIAN ASSISTANT

## 2021-02-21 PROCEDURE — 250N000013 HC RX MED GY IP 250 OP 250 PS 637: Performed by: INTERNAL MEDICINE

## 2021-02-21 PROCEDURE — 250N000013 HC RX MED GY IP 250 OP 250 PS 637: Performed by: PHYSICIAN ASSISTANT

## 2021-02-21 PROCEDURE — 250N000011 HC RX IP 250 OP 636: Performed by: INTERNAL MEDICINE

## 2021-02-21 PROCEDURE — 250N000012 HC RX MED GY IP 250 OP 636 PS 637: Performed by: INTERNAL MEDICINE

## 2021-02-21 PROCEDURE — 999N001017 HC STATISTIC GLUCOSE BY METER IP

## 2021-02-21 PROCEDURE — G0378 HOSPITAL OBSERVATION PER HR: HCPCS

## 2021-02-21 RX ORDER — POLYETHYLENE GLYCOL 3350 17 G/17G
17 POWDER, FOR SOLUTION ORAL DAILY
DISCHARGE
Start: 2021-02-21 | End: 2023-04-12

## 2021-02-21 RX ORDER — AMLODIPINE BESYLATE 5 MG/1
5 TABLET ORAL DAILY
Status: DISCONTINUED | OUTPATIENT
Start: 2021-02-21 | End: 2021-02-21 | Stop reason: HOSPADM

## 2021-02-21 RX ORDER — BACLOFEN 5 MG/1
5 TABLET ORAL 3 TIMES DAILY
DISCHARGE
Start: 2021-02-21 | End: 2021-06-10

## 2021-02-21 RX ORDER — METHYLPREDNISOLONE 4 MG
TABLET, DOSE PACK ORAL
Qty: 21 TABLET | Refills: 0 | DISCHARGE
Start: 2021-02-21 | End: 2021-06-10

## 2021-02-21 RX ORDER — AMLODIPINE BESYLATE 5 MG/1
5 TABLET ORAL DAILY
DISCHARGE
Start: 2021-02-21 | End: 2021-08-27

## 2021-02-21 RX ADMIN — POLYETHYLENE GLYCOL 3350 17 G: 17 POWDER, FOR SOLUTION ORAL at 07:58

## 2021-02-21 RX ADMIN — AMLODIPINE BESYLATE 5 MG: 5 TABLET ORAL at 09:52

## 2021-02-21 RX ADMIN — OXYCODONE HYDROCHLORIDE 5 MG: 5 TABLET ORAL at 00:36

## 2021-02-21 RX ADMIN — ALBUTEROL SULFATE 2 PUFF: 90 AEROSOL, METERED RESPIRATORY (INHALATION) at 02:36

## 2021-02-21 RX ADMIN — METHYLPREDNISOLONE 4 MG: 4 TABLET ORAL at 06:54

## 2021-02-21 RX ADMIN — ACETAMINOPHEN 1000 MG: 500 TABLET, FILM COATED ORAL at 00:36

## 2021-02-21 RX ADMIN — ACETAMINOPHEN 1000 MG: 500 TABLET, FILM COATED ORAL at 07:55

## 2021-02-21 RX ADMIN — BACLOFEN 5 MG: 10 TABLET ORAL at 07:56

## 2021-02-21 RX ADMIN — ALBUTEROL SULFATE 2 PUFF: 90 AEROSOL, METERED RESPIRATORY (INHALATION) at 07:57

## 2021-02-21 RX ADMIN — OXYCODONE HYDROCHLORIDE 5 MG: 5 TABLET ORAL at 07:55

## 2021-02-21 RX ADMIN — ONDANSETRON 4 MG: 4 TABLET, ORALLY DISINTEGRATING ORAL at 07:56

## 2021-02-21 NOTE — PLAN OF CARE
A/Ox4. VSS on room air. Mod cho diet, , 182. Assist of 1 with personal walker. Incontinent of urine, purwick in place. Oxycodone PRN and schedule tylenol for lower back pain. Lidocaine patch in place. Wears CPAP at night. Plan to discharge to TCU.

## 2021-02-21 NOTE — PLAN OF CARE
[facility] using a w/c. AVS and education given. Questions answered.

## 2021-02-21 NOTE — PLAN OF CARE
DATE & TIME: 2.20.21 2300-0700    Cognitive Concerns/ Orientation : AO2-4.   BEHAVIOR & AGGRESSION TOOL COLOR: Green   ABNL VS/O2: HTN, 92% O2sat on home CPAP  MOBILITY: A1 GBW  PAIN MANAGMENT: Oxy, tylenol  DIET: Mod carb  BOWEL/BLADDER: Purwick, 1BM overnight  ABNL LAB/BG: NA  DRAIN/DEVICES: PIV SL  TELEMETRY RHYTHM: NA  SKIN: Intact  TESTS/PROCEDURES: NA  D/C DAY/GOALS/PLACE: Pending for TCU  OTHER IMPORTANT INFO: Lidocaine patch fell off.

## 2021-02-21 NOTE — PLAN OF CARE
Physical Therapy Discharge Summary    Reason for therapy discharge:    Discharged to transitional care facility.    Progress towards therapy goal(s). See goals on Care Plan in [facility] electronic health record for goal details.  Goals not met.  Barriers to achieving goals:   discharge from facility.    Therapy recommendation(s):    Continued therapy is recommended.  Rationale/Recommendations:  Patient will benefit from continued skilled therapies in TCU to progress safety and independence with mobility toward baseline.

## 2021-02-21 NOTE — DISCHARGE SUMMARY
[facility]  Hospitalist Discharge Summary       Date of Admission:  2/17/2021  Date of Discharge:  2/21/2021  Discharging Provider: JoAnna K. Barthell, PA-C    Discharge Diagnoses   Acute on chronic back pain from lumbar radiculopathy, spinal stenosis, muscle spasms.  Physical deconditioning.  Narcotic induced constipation.  Leukocytosis.  CAD status post bare-metal stenting 2006.  Valvular heart disease.  Chronic diastolic CHF.  Hyperlipidemia.  Hypertension.  CKD stage IIIb.  Type 2 diabetes.  And DDD.  GERD.  Mild persistent asthma, not in exacerbation.  ELIGIO.  Morbid obesity.  Follow-ups Needed After Discharge   Follow-up Appointments     Follow Up and recommended labs and tests      Follow up with correction physician.  The following labs/tests are   recommended: adjust pain medications as possible, basic metabolic panel in   1 week . New amlodipine 5mg. Follow bp and adjust med regimen as   indicated.           Discharge Disposition   Discharged to short-term care facility  Condition at discharge: Stable    Hospital Course   Moira Andre is a 87 year old female admitted on 2/17/2021. PMHx ELIGIO, depression/anxiety, CKD, HTN, dCHF, CAD, and DM2 who presented with acute on chronic low back pain.     1. Acute on chronic back pain from lumbar radiculopathy, spinal stenosis, and muscle spasms.  2. Physical deconditioning.  Complains spasms down R hip and leg. Follows outpatient with [facility] pain clinic. No red flag sxs. Treated with fentanyl, oxycodone, and valium with ongoing severe pain in ED. MRI L-spine grade I L5-S1 spondylolisthesis, L2-S1 moderate to severe spinal canal stenosis. Ortho consulted, patient not interested in surgical intervention and prefers ongoing conservative mngt hoping to continue care with [facility].  - Fluctuating severity of pain, but overall improved from admission with initiation of scheduled and PRN regimen. Taper as able.  - Scheduled pain regimen:               -  APAP 1000mg TID, baclofen 5mg TID, Lidocaine patch, Medrol dosepak (on day 3 of taper on day of discharge).  - PRN pain regimen:               - Zanaflex 2mg TID and oxycodone 5mg Q6H.               - Hold parameters for excess sedation or decreased RR.  - Avoid NSAIDs due to kidney disease.  - PT rec TCU.     Constipation, likely narcotic induced.  Improved with initiation of scheduled bowel regimen. Last BM 2/20.  - Discontinue Senokot-s due to yellow dye allx (though noted to be on her PTA med list).  - Schedule Miralax daily.  - Give Doculax suppository today, PRNs avail thereafter.     Leukocytosis.  Mild measuring 12.4 on adm and resolved without intervention aside from pain control. No sxs infection aside from abnl UA. Subsequent urine ctx growing urogenital tyler. No abx started.     CAD s/p bare metal stenting to OM1 and diagonal (~2006).  Valvular heart disease (aortic stenosis, mitral stenosis).  Chronic diastolic CHF, not in exacerbation.  Hyperlipidemia.  Hypertension.  [atorvastatin 20 mg daily, plavix 75 mg daily, losartan 50 mg daily, metoprolol 100 mg daily, prn NTG]  Last echo 11/2020 EF 60-65%, noted valvular abnormalities. ARB was held on adm d/t increased renal dysfunction and resumed prior to discharge.  - Continue on PTA meds.  - Add amlodipine 5mg daily d/t persistent elevated -170s. Titrate as needed.    CKD, 3b.  Baseline creatinine ~1.3-1.5 recently. Creatinine at the time of admission at 1.69. ARB held and treated with gentle fluids.  - BMP in 1 week (~2/27).     DM II. A1C 6.2% 8/2020.   - Cont PTA glimepiride 2mg daily using her home supply d/t yellow dye allx.  - BID and HS blood sugars  - Mod CHO diet     MDD.  - Cont PTA bupropion 100 mg at bedtime, duloxetine 50 mg daily     GERD.  - PTA famotidine 20 mg at bedtime      Asthma.  - Cont PTA Wixela one puff BID and albuterol prn.     ELIGIO.  Continue home CPAP.     Morbid obesity.  Contributing to all cause morbidity and  mortality.    Consultations This Hospital Stay   PHYSICAL THERAPY ADULT IP CONSULT  ORTHOPEDIC SURGERY IP CONSULT  SPINE SURGERY ADULT IP CONSULT  PHYSICAL THERAPY ADULT IP CONSULT  OCCUPATIONAL THERAPY ADULT IP CONSULT  SOCIAL WORK IP CONSULT  SOCIAL WORK IP CONSULT  PHYSICAL THERAPY ADULT IP CONSULT  OCCUPATIONAL THERAPY ADULT IP CONSULT    Code Status   Full Code    Time Spent on this Encounter   I, JoAnna K. Barthell, PA-C, personally saw the patient today and spent less than or equal to 30 minutes discharging this patient.       This patient was discussed with Dr. Head of the Hospitalist Service who agrees with current plans as outlined above.    JoAnna K. Barthell, PA-C  [facility]  ______________________________________________________________________  Physical Exam   Temp: 95.3  F (35.2  C) Temp src: Axillary BP: (!) 179/85 Pulse: 54   Resp: 18 SpO2: 90 % O2 Device: None (Room air)    Constitutional: Appears stated age, no acute distress.  Respiratory: No increased work of breathing.  GI: Morbidly obese.  Skin: No rashes or lesions on exposed skin.       Primary Care Physician   Maryan Churchill    Discharge Orders      General info for SNF    Length of Stay Estimate: Short Term Care: Estimated # of Days <30  Condition at Discharge: Improving  Level of care:skilled   Rehabilitation Potential: fair  Admission H&P remains valid and up-to-date: Yes  Recent Chemotherapy: N/A  Use Nursing Home Standing Orders: Yes     Mantoux instructions    Give two-step Mantoux (PPD) Per Facility Policy Yes     Reason for your hospital stay    Back pain, muscle spasms     Glucose monitor nursing POCT    Before meals and at bedtime     Intake and output    Every shift     Daily weights    Call Provider for weight gain of more than 2 pounds per day or 5 pounds per week.     Follow Up and recommended labs and tests    Follow up with [facility] physician.  The following labs/tests are recommended:  adjust pain medications as possible, basic metabolic panel in 1 week .     Activity - Up with nursing assistance     Additional Discharge Instructions    Continue home CPAP     Physical Therapy Adult Consult    Evaluate and treat as clinically indicated.    Reason:  Deconditioning, back pain/spasm     Occupational Therapy Adult Consult    Evaluate and treat as clinically indicated.    Reason: deconditioning     Fall precautions     Advance Diet as Tolerated    Follow this diet upon discharge: Orders Placed This Encounter      Moderate Consistent CHO Diet     Significant Results and Procedures   Most Recent 3 CBC's:  Recent Labs   Lab Test 02/18/21  0600 02/17/21  1758 04/01/19  1524   WBC 10.3 12.4* 8.9   HGB 11.9 13.1 12.4   MCV 85 86 85    375 366     Most Recent 3 BMP's:  Recent Labs   Lab Test 02/18/21  0600 02/17/21  1758 08/24/20  0951    137 138   POTASSIUM 4.2 4.1 4.6   CHLORIDE 108 106 107   CO2 22 24 21   BUN 34* 38* 21   CR 1.50* 1.69* 1.50*   ANIONGAP 6 7 9   TELLY 8.4* 9.4 9.1   * 82 120*     Most Recent 6 Bacteria Isolates From Any Culture (See EPIC Reports for Culture Details):  Recent Labs   Lab Test 02/18/21  2030 08/24/18  0645 08/22/18  2227 08/22/18  2210 08/22/18  2015 07/04/18  0918   CULT >100,000 colonies/mL  mixed urogenital tyler  Multiple morphotypes present with no predominant organism.  Growth consistent with   probable contamination during collection.  Suggest repeat specimen if clinically   indicated.  Susceptibility testing not routinely done   <10,000 colonies/mL  mixed urogenital tyler  Susceptibility testing not routinely done   >100,000 colonies/mL  mixed urogenital tyler    Susceptibility testing not routinely done No growth No growth >100,000 colonies/mL  mixed urogenital tyler       Most Recent 6 glucoses:  Recent Labs   Lab Test 02/18/21  0600 02/17/21  1758 08/24/20  0951 01/20/20  1111 09/20/19  1323 06/19/19  1252   * 82 120* 135* 61* 69*     Most  Recent Urinalysis:  Recent Labs   Lab Test 02/18/21  2030 01/15/16  1127 01/15/16  1127   COLOR Yellow   < > Yellow   APPEARANCE Slightly Cloudy   < > Clear   URINEGLC Negative   < > Negative   URINEBILI Negative   < > Negative   URINEKETONE Negative   < > Negative   SG 1.021   < > 1.015   UBLD Large*   < > Negative   URINEPH 5.5   < > 5.0   PROTEIN 10*   < > Negative   UROBILINOGEN  --   --  0.2   NITRITE Positive*   < > Negative   LEUKEST Small*   < > Negative   RBCU 15*   < > O - 2   WBCU 4   < > O - 2    < > = values in this interval not displayed.     Results for orders placed or performed during the hospital encounter of 02/17/21   MR Lumbar Spine w/o Contrast    Narrative    MRI OF THE LUMBAR SPINE WITHOUT CONTRAST February 18, 2021 1:31 PM     HISTORY: Lumbar radiculopathy, osteoporosis presence or risk.    TECHNIQUE: Multiplanar, multisequence MRI images of the lumbar spine  were acquired without IV contrast.    COMPARISON: None.    FINDINGS: There are five lumbar-type vertebrae for the purposes of  this dictation.     There is grade 1 degenerative anterolisthesis of L5 upon S1. Alignment  of the lumbar vertebrae is otherwise normal. Vertebral body heights of  the lumbar spine are normal. There is Modic 1 reactive, degenerative  marrow signal change in the opposing endplates at the L1-L2 level;  marrow signal throughout the lumbar vertebrae is otherwise normal. No  evidence for fracture or pathologic bony lesion of the lumbar spine.    There is loss of disc height, disc desiccation and posterior disc  bulging/herniation to varying degrees at all levels of the lumbar  spine.    The tip of the conus medullaris is at the L1-L2 level which is within  normal limits. There is no evidence for intrathecal abnormality.     Level by level:     T12-L1: Loss of disc height, disc desiccation and mild circumferential  disc bulging. No herniation. Moderate facet arthropathy bilaterally.  No spinal canal stenosis. Mild  bilateral neural foraminal narrowing.    L1-L2: Loss of disc height, disc desiccation and mild circumferential  disc bulging. No herniation. Moderate facet arthropathy bilaterally.  Mild spinal canal narrowing. Moderate left foraminal stenosis. Mild  right foraminal narrowing.    L2-L3: Loss of disc height, disc desiccation and moderate  circumferential disc bulging. No herniation. Severe facet arthropathy  bilaterally. Ligamentum flavum thickening bilaterally. Moderate spinal  canal stenosis. Mild left foraminal narrowing. Mild right foraminal  narrowing.    L3-L4: Loss of disc height, disc desiccation and moderate  circumferential disc bulging. No herniation. Circumferential endplate  spurring. Moderate facet arthropathy bilaterally. Ligamentum flavum  thickening bilaterally. Moderate spinal canal stenosis. Mild bilateral  neural foraminal narrowing.    L4-L5: Loss of disc height, disc desiccation and moderate  circumferential disc bulging. No herniation. Circumferential endplate  spurring. Moderate facet arthropathy bilaterally. Ligamentum flavum  thickening bilaterally. Moderate spinal canal stenosis.  Moderate-severe right foraminal stenosis. Moderate left foraminal  stenosis.    L5-S1: Loss of disc height, disc desiccation and mild circumferential  disc bulging. No herniation. Severe facet arthropathy bilaterally.  Severe thickening of the ligamentum flavum bilaterally. Severe spinal  canal stenosis. Moderate right foraminal stenosis. Severe left  foraminal stenosis.      Impression    IMPRESSION:  1. Diffuse degenerative changes of the lumbar spine as detailed above.  2. Moderate-severe spinal canal stenosis at L2-L3, L3-L4, L4-L5 and  L5-S1.  3. Moderate-severe neural foraminal stenosis on the left at L1-L2,  bilaterally at L4-L5 and bilaterally at L5-S1.    JUAN LUIS CUNNINGHAM MD       Discharge Medications   Current Discharge Medication List      START taking these medications    Details   amLODIPine (NORVASC)  5 MG tablet Take 1 tablet (5 mg) by mouth daily  Qty:      Associated Diagnoses: Essential hypertension      Baclofen (LIORESAL) 5 MG tablet Take 1 tablet (5 mg) by mouth 3 times daily    Associated Diagnoses: Lumbar radiculopathy      Lidocaine (LIDOCARE) 4 % Patch Place 1 patch onto the skin every 24 hours To prevent lidocaine toxicity, patient should be patch free for 12 hrs daily.    Associated Diagnoses: Lumbar radiculopathy      methylPREDNISolone (MEDROL DOSEPAK) 4 MG tablet therapy pack Follow Package Directions  Qty: 21 tablet, Refills: 0    Associated Diagnoses: Lumbar radiculopathy      senna-docusate (SENOKOT-S/PERICOLACE) 8.6-50 MG tablet Take 1 tablet by mouth 2 times daily as needed for constipation  Qty:      Associated Diagnoses: Lumbar radiculopathy         CONTINUE these medications which have CHANGED    Details   acetaminophen (TYLENOL) 500 MG tablet Take 2 tablets (1,000 mg) by mouth 3 times daily  Qty:      Associated Diagnoses: Lumbar radiculopathy      buPROPion (WELLBUTRIN SR) 100 MG 12 hr tablet Take 1 tablet (100 mg) by mouth every evening  Qty:      Associated Diagnoses: Lumbar radiculopathy      oxyCODONE (ROXICODONE) 5 MG tablet Take 1 tablet (5 mg) by mouth every 6 hours as needed for moderate to severe pain  Qty: 10 tablet, Refills: 0    Associated Diagnoses: Lumbar radiculopathy         CONTINUE these medications which have NOT CHANGED    Details   albuterol (PROAIR HFA/PROVENTIL HFA/VENTOLIN HFA) 108 (90 Base) MCG/ACT inhaler Inhale 2 puffs into the lungs every 6 hours For shortness of breath  Qty: 1 Inhaler, Refills: 3    Comments: Pharmacy may dispense brand covered by insurance (Proair, or proventil or ventolin or generic albuterol inhaler)  Associated Diagnoses: SOB (shortness of breath)      atorvastatin (LIPITOR) 20 MG tablet TAKE 1 TABLET EVERY DAY  Qty: 90 tablet, Refills: 2    Associated Diagnoses: Hyperlipidemia LDL goal <70      Cholecalciferol (VITAMIN D) 2000 UNITS CAPS  Take 2,000 Units by mouth daily       clopidogrel (PLAVIX) 75 MG tablet TAKE 1 TABLET EVERY DAY (NEED MD APPOINTMENT)  Qty: 90 tablet, Refills: 1    Associated Diagnoses: Coronary artery disease involving native coronary artery of native heart without angina pectoris      DULoxetine (CYMBALTA) 60 MG capsule Take 1 capsule (60 mg) by mouth daily for depression  Qty: 90 capsule, Refills: 1    Associated Diagnoses: Moderate major depression (H)      famotidine (PEPCID) 20 MG tablet TAKE 1 TABLET TWICE DAILY (REPLACES RANITIDINE)  Qty: 180 tablet, Refills: 3    Associated Diagnoses: Gastroesophageal reflux disease without esophagitis      fluticasone (FLONASE) 50 MCG/ACT nasal spray Spray 1 spray into both nostrils daily as needed       glimepiride (AMARYL) 2 MG tablet Take 1 tablet (2 mg) by mouth every morning (before breakfast) for diabetese  Qty: 90 tablet, Refills: 3    Associated Diagnoses: Type 2 diabetes mellitus with diabetic nephropathy, without long-term current use of insulin (H)      IBANdronate (BONIVA) 150 MG tablet TAKE 1 TABLET EVERY 30 DAYS FOR OSTEOPENIA  Qty: 3 tablet, Refills: 1    Associated Diagnoses: Osteopenia with high risk of fracture      losartan (COZAAR) 50 MG tablet Take 1 tablet (50 mg) by mouth daily for Blood Pressure  Qty: 90 tablet, Refills: 3    Associated Diagnoses: Essential hypertension      melatonin 1 MG TABS tablet Take 1 tablet (1 mg) by mouth nightly as needed for sleep  Qty:      Associated Diagnoses: Lumbar radiculopathy      Menthol, Topical Analgesic, (ICY HOT EX) Apply to affected area every morning      metoprolol succinate ER (TOPROL-XL) 100 MG 24 hr tablet Take 1 tablet (100 mg) by mouth daily  Qty: 90 tablet, Refills: 3    Associated Diagnoses: Essential hypertension      nitroGLYcerin (NITROSTAT) 0.4 MG sublingual tablet For chest pain place 1 tablet under the tongue every 5 minutes for 3 doses. If symptoms persist 5 minutes after 1st dose call 911.  Qty: 25 tablet,  Refills: 0    Associated Diagnoses: Chest pain, unspecified type      nystatin-triamcinolone (MYCOLOG II) 601306-9.1 UNIT/GM-% external cream APPLY TOPICALLY DAILY PRN  Qty: 60 g, Refills: 1    Associated Diagnoses: Tinea of the body      tiZANidine (ZANAFLEX) 2 MG tablet Take 1 tablet (2 mg) by mouth 3 times daily as needed for muscle spasms  Qty: 20 tablet, Refills: 1    Associated Diagnoses: Muscle spasm      WIXELA INHUB 100-50 MCG/DOSE inhaler INHALE 1 PUFF EVERY 12 HOURS  Qty: 180 each, Refills: 1    Associated Diagnoses: Intermittent asthma, uncomplicated      blood glucose monitoring (NO BRAND SPECIFIED) meter device kit Use to test blood sugar 1-2 times daily or as directed.  Qty: 1 kit, Refills: 0    Comments: [employer] True Metrix Air Meter  Associated Diagnoses: Type 2 diabetes mellitus with diabetic nephropathy (H)      Blood Glucose Monitoring Suppl (TRUE METRIX AIR GLUCOSE METER) W/DEVICE KIT USE AS DIRECTED  Qty: 1 kit, Refills: 0    Associated Diagnoses: Type 2 diabetes mellitus with diabetic nephropathy (H)      clotrimazole (LOTRIMIN) 1 % external cream Apply topically 2 times daily  Qty: 45 g, Refills: 1    Associated Diagnoses: Tinea pedis of left foot      !! order for DME Equipment being ordered: diabetic shoes  Qty: 1 each, Refills: 0    Associated Diagnoses: Type 2 diabetes mellitus with diabetic nephropathy, without long-term current use of insulin (H)      !! order for DME [employer]  5-15 CM/H20  NASAL WISP FABRIC      TRUE METRIX BLOOD GLUCOSE TEST test strip TEST BLOOD GLUCOSE EVERY DAY  Qty: 100 strip, Refills: 1    Associated Diagnoses: Type 2 diabetes mellitus with diabetic nephropathy, without long-term current use of insulin (H)      TRUEPLUS LANCETS 28G MISC 1 each 2 times daily as needed  Qty: 100 each, Refills: 3    Comments: Trueplus Super Think 28g lancets  Associated Diagnoses: Type 2 diabetes mellitus with diabetic nephropathy (H)       !! - Potential duplicate medications  found. Please discuss with provider.        Allergies   Allergies   Allergen Reactions     Aspirin Hives     Reaction occurred during childhood.      Lisinopril Cough     Metformin      Elevated lactic acid     Minocycline      Yellow Dye Allergy. Minocycline has Yellow Dye #10.     Yellow Dye Hives     Rxn to yellow tablet. Eyes swelled shut.

## 2021-02-21 NOTE — PLAN OF CARE
Observation goals PRIOR TO DISCHARGE     Comments:   -diagnostic tests and consults completed and resulted: not met pt to see   -vital signs normal or at patient baseline: met   -adequate pain control on oral analgesics: partially met, pain not under control   -returns to baseline functional status: not met   -safe disposition plan has been identified: not met

## 2021-02-21 NOTE — PROGRESS NOTES
Care Management Follow Up    Length of Stay (days): 0    Expected Discharge Date: 02/20/21     Concerns to be Addressed: discharge planning     Patient plan of care discussed at interdisciplinary rounds: Yes    Anticipated Discharge Disposition: Transitional Care, Skilled Nursing Facilty  Disposition Comments: [facility].  Anticipated Discharge Services: None  Anticipated Discharge DME: None    Patient/family educated on Medicare website which has current facility and service quality ratings: yes  Education Provided on the Discharge Plan:    Patient/Family in Agreement with the Plan: yes    Referrals Placed by CM/SW: Senior Linkage Line, Post Acute Facilities  Private pay costs discussed: Not applicable    Additional Information:  Insurance Auth obtained. Writer called Family member Sophia for transport and left a vm.     8:38a Addendum: Writer spoke with Sophia who requested medical transport. [facility] w/c transport is scheduled for 10:30a. Provider paged for orders.     STEF Culver

## 2021-02-21 NOTE — PROGRESS NOTES
Observation goals PRIOR TO DISCHARGE      Comments:   -diagnostic tests and consults completed and resulted: Partially Met  -vital signs normal or at patient baseline: Met  -adequate pain control on oral analgesics: Met   -returns to baseline functional status: Not Met  -safe disposition plan has been identified: Partially Met

## 2021-02-21 NOTE — PROGRESS NOTES
Observation goals PRIOR TO DISCHARGE    Comments:   -diagnostic tests and consults completed and resulted : Partially met    -vital signs normal or at patient baseline: Met    -adequate pain control on oral analgesics : Partially met    -returns to baseline functional status : Partially met    -safe disposition plan has been identified : Partially met    Nurse to notify provider when observation goals have been met and patient is ready for discharge.

## 2021-03-12 ENCOUNTER — TELEPHONE (OUTPATIENT)
Dept: FAMILY MEDICINE | Facility: CLINIC | Age: 86
End: 2021-03-12

## 2021-03-12 NOTE — TELEPHONE ENCOUNTER
Called [facility].   Patient recently in ED/IP 2/17/21.   Please review notes.     HC requesting OK for [facility] skilled nursing, PHYSICAL THERAPY, OT.     Triage will follow up after hearing from Dr Churchill.     Thank you,  Reyna MERRILL, RN      March 12, 2021  3:26 PM

## 2021-03-12 NOTE — TELEPHONE ENCOUNTER
I can do follow-up for home care but patient will need an appointment  Video visit would be okay  Okay to give verbal orders  Dr.Nasima Kerline MD

## 2021-03-12 NOTE — TELEPHONE ENCOUNTER
Returned call. OK'd requested orders.  Orders will be faxed to PCP for review and signature.     Called patient to schedule  Hospital follow up.   Patient states not able to do a Video Visit , however Telephone Visit scheduled for Monday 3/15/21.         Reyna Castillo RN

## 2021-03-12 NOTE — TELEPHONE ENCOUNTER
Reason for Call:  Other call back    Detailed comments: Please call Home [facility] back as they would like to know if Dr Maryan Churchill will be following the patient for home care.     Phone Number Patient can be reached at: Other phone number:  [employer]  [employer] # [phone]    Best Time: any    Can we leave a detailed message on this number? YES    Call taken on 3/12/2021 at 9:46 AM by Jose Armando Murphy

## 2021-03-15 ENCOUNTER — VIRTUAL VISIT (OUTPATIENT)
Dept: FAMILY MEDICINE | Facility: CLINIC | Age: 86
End: 2021-03-15
Payer: COMMERCIAL

## 2021-03-15 DIAGNOSIS — Z09 HOSPITAL DISCHARGE FOLLOW-UP: Primary | ICD-10-CM

## 2021-03-15 DIAGNOSIS — M62.830 BACK MUSCLE SPASM: ICD-10-CM

## 2021-03-15 PROCEDURE — 99213 OFFICE O/P EST LOW 20 MIN: CPT | Mod: 95 | Performed by: NURSE PRACTITIONER

## 2021-03-15 NOTE — PROGRESS NOTES
Kristyn is a 87 year old who is being evaluated via a billable telephone visit.      What phone number would you like to be contacted at? [phone]  How would you like to obtain your AVS? Mail a copy    Assessment & Plan   Problem List Items Addressed This Visit     Back muscle spasm      Other Visit Diagnoses     Hospital discharge follow-up    -  Primary         Pt is doing well after discharge from hospital [facility] for [facility] pain, muscle spasms. She is happy to be at home as she basically felt like she was declining in the [facility]CU as she lives alone at home and is more active at home. She has no concerns today. Pt will follow-up if she has any concerns.     YOHANNES Saldana [facility] FARIDA Simons is a 87 year old who presents for the following health issues     HPI     Chief Complaint   Patient presents with     Hospital F/U     rehab at  [facility] [place].  got a Covid vaccine but can't find which one or date in her paperwork.     URI     started Friday nasal congestion, chills, slight clear productive cough. Using vicks  & tylenol and wrapping up. Does not think has fever.            Hospital Follow-up Visit:    Hospital/Nursing Home/IP Rehab Facility: [facility]  Date of Admission: 02/17/2021  Date of Discharge: 02/21/2021   Reason(s) for Admission: Lumbar radiculopathy  Back muscle spasm        Was your hospitalization related to COVID-19? No   Problems taking medications regularly:  None  Medication changes since discharge: None  Problems adhering to non-medication therapy:  None    Summary of hospitalization:  Discharge summary unavailable  Diagnostic Tests/Treatments reviewed.  Follow up needed: none  Other Healthcare Providers Involved in Patient s Care:         None  Update since discharge: improved.       Post Discharge Medication Reconciliation: discharge medications reconciled, continue medications without change.  Plan of care  communicated with patient              Initially went to the ED d/t spasms in the legs. Was admitted and then transferred to [facility]   Stated she felt worse on discharge as she wasn't doing much in the TCU. She lives alone in her own home so is typically more active.   Was discharged home 8 days ago   Got cold symptoms last week--sniffles and eyes watering and nose running. Rare cough with clear sputum   No fevers   Symptoms are not changing much   Was able to take a shower today.   Has not checked BP at home   She has no concerns today       Review of Systems   Detailed as above       Objective           Vitals:  No vitals were obtained today due to virtual visit.    Physical Exam   healthy, alert and no distress  PSYCH: Alert and oriented times 3; coherent speech, normal   rate and volume, able to articulate logical thoughts, able   to abstract reason, no tangential thoughts, no hallucinations   or delusions  Her affect is normal  RESP: No cough, no audible wheezing, able to talk in full sentences  Remainder of exam unable to be completed due to telephone visits            Phone call duration: 10 minutes

## 2021-03-29 PROBLEM — M54.2 CERVICAL PAIN: Status: RESOLVED | Noted: 2020-08-26 | Resolved: 2021-03-29

## 2021-05-25 DIAGNOSIS — B35.4 TINEA OF THE BODY: ICD-10-CM

## 2021-05-26 RX ORDER — NYSTATIN AND TRIAMCINOLONE ACETONIDE 100000; 1 [USP'U]/G; MG/G
CREAM TOPICAL
Qty: 60 G | Refills: 1 | Status: SHIPPED | OUTPATIENT
Start: 2021-05-26 | End: 2021-08-27

## 2021-06-10 ENCOUNTER — PATIENT OUTREACH (OUTPATIENT)
Dept: NURSING | Facility: CLINIC | Age: 86
End: 2021-06-10
Payer: COMMERCIAL

## 2021-06-10 ENCOUNTER — OFFICE VISIT (OUTPATIENT)
Dept: FAMILY MEDICINE | Facility: CLINIC | Age: 86
End: 2021-06-10
Payer: COMMERCIAL

## 2021-06-10 VITALS
OXYGEN SATURATION: 95 % | TEMPERATURE: 96.6 F | HEART RATE: 73 BPM | SYSTOLIC BLOOD PRESSURE: 139 MMHG | WEIGHT: 268 LBS | BODY MASS INDEX: 41.97 KG/M2 | DIASTOLIC BLOOD PRESSURE: 84 MMHG

## 2021-06-10 DIAGNOSIS — Z01.818 PREOP GENERAL PHYSICAL EXAM: ICD-10-CM

## 2021-06-10 DIAGNOSIS — H26.9 CATARACT OF BOTH EYES, UNSPECIFIED CATARACT TYPE: Primary | ICD-10-CM

## 2021-06-10 PROCEDURE — 99213 OFFICE O/P EST LOW 20 MIN: CPT | Performed by: PHYSICIAN ASSISTANT

## 2021-06-10 SDOH — ECONOMIC STABILITY: TRANSPORTATION INSECURITY
IN THE PAST 12 MONTHS, HAS THE LACK OF TRANSPORTATION KEPT YOU FROM MEDICAL APPOINTMENTS OR FROM GETTING MEDICATIONS?: NO

## 2021-06-10 SDOH — ECONOMIC STABILITY: TRANSPORTATION INSECURITY
IN THE PAST 12 MONTHS, HAS LACK OF TRANSPORTATION KEPT YOU FROM MEETINGS, WORK, OR FROM GETTING THINGS NEEDED FOR DAILY LIVING?: NO

## 2021-06-10 SDOH — ECONOMIC STABILITY: FOOD INSECURITY: WITHIN THE PAST 12 MONTHS, THE FOOD YOU BOUGHT JUST DIDN'T LAST AND YOU DIDN'T HAVE MONEY TO GET MORE.: NEVER TRUE

## 2021-06-10 SDOH — ECONOMIC STABILITY: FOOD INSECURITY: WITHIN THE PAST 12 MONTHS, YOU WORRIED THAT YOUR FOOD WOULD RUN OUT BEFORE YOU GOT MONEY TO BUY MORE.: NEVER TRUE

## 2021-06-10 SDOH — SOCIAL STABILITY: SOCIAL NETWORK: ARE YOU MARRIED, WIDOWED, DIVORCED, SEPARATED, NEVER MARRIED, OR LIVING WITH A PARTNER?: DIVORCED

## 2021-06-10 SDOH — HEALTH STABILITY: MENTAL HEALTH
STRESS IS WHEN SOMEONE FEELS TENSE, NERVOUS, ANXIOUS, OR CAN'T SLEEP AT NIGHT BECAUSE THEIR MIND IS TROUBLED. HOW STRESSED ARE YOU?: NOT AT ALL

## 2021-06-10 ASSESSMENT — ACTIVITIES OF DAILY LIVING (ADL): DEPENDENT_IADLS:: INDEPENDENT

## 2021-06-10 NOTE — LETTER
[facility] CARE COORDINATION  [address] [place] [address]    Gail 10, 2021    Moira Andre  [address]  [place] [address]      Dear Moira,    I am a clinic care coordinator who works with Maryan Churchill MD at [facility]. I wanted to thank you for spending the time to talk with me.  Below is a description of clinic care coordination and how I can further assist you.      The clinic care coordination team is made up of a registered nurse,  and community health worker who understand the health care system. The goal of clinic care coordination is to help you manage your health and improve access to the health care system in the most efficient manner. The team can assist you in meeting your health care goals by providing education, coordinating services, strengthening the communication among your providers and supporting you with any resource needs.    Please feel free to contact me at [phone] with any questions or concerns. We are focused on providing you with the highest-quality healthcare experience possible and that all starts with you.     Sincerely,     TREE Palma

## 2021-06-10 NOTE — PROGRESS NOTES
Clinic Care Coordination Contact    Clinic Care Coordination Contact  OUTREACH    Referral Information:  Referral Source: PCP    Primary Diagnosis: Psychosocial    Chief Complaint   Patient presents with     Clinic Care Coordination - Initial        Universal Utilization:   Clinic Utilization  Difficulty keeping appointments:: No  Compliance Concerns: No  No-Show Concerns: No  No PCP office visit in Past Year: No  Utilization    Last refreshed: 6/10/2021 11:36 AM: Hospital Admissions 1           Last refreshed: 6/10/2021 11:36 AM: ED Visits 1           Last refreshed: 6/10/2021 11:36 AM: No Show Count (past year) 0              Current as of: 6/10/2021 11:36 AM            Clinical Concerns:  ARGENTINA CHANEL spoke with pt regarding her overall wellbeing. Pt shared that she has been told [employer] will no longer cover her providers in [facility]. She had already information [facility] that she would like to keep 2 of her [facility] providers and they were both included on her paperwork from "[employer]. She is unsure if something has now changed. ARGENTINA CHANEL encouraged her to discuss this with [facility] as they have been making exceptions for continuity of care.     Pt shared that she used to have a RN through "[employer] call to check in on her every month but this has now stopped. ARGENTINA CHANEL encouraged her to discuss starting this again if she found it helpful.    Pt shared that she needs to have some dental work done but it will be very expensive. She requested the phone number for [facility], ARGENTINA CHANEL provided this ([phone]).    Current Medical Concerns:  none    Current Behavioral Concerns: none    Education Provided to patient: CC role   Pain  Pain (GOAL):: No  Health Maintenance Reviewed: Up to date  Clinical Pathway: None    Medication Management:  Did not discuss at this time     Functional Status:  Dependent ADLs:: Independent  Dependent IADLs:: Independent  Bed or wheelchair confined:: No  Mobility Status: Independent  Fallen 2 or more times in the  past year?: No  Any fall with injury in the past year?: No    Living Situation:  Current living arrangement:: I live alone  Type of residence:: Apartment    Lifestyle & Psychosocial Needs:  Lifestyle     Physical activity     Days per week: Not on file     Minutes per session: Not on file     Stress: Not at all     Social Needs     Financial resource strain: Not on file     Food insecurity     Worry: Never true     Inability: Never true     Transportation needs     Medical: No     Non-medical: No     Diet:: Diabetic diet  Inadequate nutrition (GOAL):: No  Tube Feeding: No  Inadequate activity/exercise (GOAL):: No  Significant changes in sleep pattern (GOAL): No  Transportation means:: Regular car     Yarsani or spiritual beliefs that impact treatment:: No  Mental health DX:: No  Mental health management concern (GOAL):: No  Chemical Dependency Status: No Current Concerns   Socioeconomic History     Marital status:      Spouse name: Not on file     Number of children: Not on file     Years of education: Not on file     Highest education level: Not on file   Relationships     Social connections     Talks on phone: Not on file     Gets together: Not on file     Attends Yarsanism service: Not on file     Active member of club or organization: Not on file     Attends meetings of clubs or organizations: Not on file     Relationship status:      Intimate partner violence     Fear of current or ex partner: Not on file     Emotionally abused: Not on file     Physically abused: Not on file     Forced sexual activity: Not on file     Tobacco Use     Smoking status: Former Smoker     Packs/day: 0.25     Types: Cigarettes     Start date:      Quit date: 1973     Years since quittin.1     Smokeless tobacco: Never Used   Substance and Sexual Activity     Alcohol use: Yes     Alcohol/week: 0.0 - 1.0 standard drinks     Comment: rarely     Drug use: No     Sexual activity: Not Currently     Partners:  Male      Resources and Interventions:  Current Resources:    Community Resources: Other (see comment)([employer] [facility])  Supplies used at home:: Hearing Aid Batteries     Employment Status: retired)   )    Advance Care Plan/Directive  Advanced Care Plans/Directives on file:: No    Referrals Placed: None     Patient/Caregiver understanding: Pt reports understanding and denies any additional questions or concerns at this times. SW CC engaged in AIDET communication during encounter.     Future Appointments              Today Sw, [facility] [place]        Plan: At this time, pt denies outstanding need for connection or referral to resources or assistance navigating recommended follow up care. No further outreaches will be made at this time unless a new referral is made or a change in the pt's status occurs. Patient was provided with CC SW contact information and encouraged to call with any questions or concerns.    Serina Palacios, [facility]  Clinic Care Coordinator  [facility] Linda Blackford  [facility]  [phone]  [email or web address]

## 2021-06-10 NOTE — PATIENT INSTRUCTIONS
Take all evening medications as usual the night before surgery.    Do not take glimepiride until you eat after surgery.

## 2021-06-10 NOTE — PROGRESS NOTES
[address], [address]  [facility] [address]  Phone: [phone]  Primary Provider: Maryan Churchill  Pre-op Performing Provider: FINESSE DEGROOT      PREOPERATIVE EVALUATION:  Today's date: 6/10/2021    Moira Andre is a 87 year old female who presents for a preoperative evaluation.    Surgical Information:  Surgery/Procedure: Cataract Removal  Surgery Location: [facility]  Surgeon: Dr. Harkins  Surgery Date: 06/14/2021, 06/28/2021  Time of Surgery: 12 pm  Where patient plans to recover: At home alone  Fax number for surgical facility: [phone]    Type of Anesthesia Anticipated: to be determined    Assessment & Plan     The proposed surgical procedure is considered LOW risk.    Assessment and Plan:     (H26.9) Cataract of both eyes, unspecified cataract type  (primary encounter diagnosis)  Comment: local anesthetic, low risk surgery   Plan: no labs or ekg needed for minor surgery, will have her follow-up with Dr. Churchill for general physical after she talks with  regarding new insurance/financial issues      (Z01.818) Preop general physical exam  Plan: CARE COORDINATION REFERRAL        See above      Finesse Jeff PA-C        Risks and Recommendations:  Low risk surgery, no additional work-up needed    The patient will hold glimeperide the am of surgery and take when she eats    RECOMMENDATION:  APPROVAL GIVEN to proceed with proposed procedure, without further diagnostic evaluation.        Subjective     HPI related to upcoming procedure: cataract surgery, bilateral    Kristyn feels well today, she denies any recent changes in her health.  We reviewed her medications at out visit.  She denies headache, fever/chills, congestion, cough, shortness of breath, chest pain, abdominal pain, nausea/vomiting, urinary complaint, diarrhea.      No flowsheet data found.  Preop Questions 6/10/2021   1. Have you ever had a heart attack  or stroke? No   2. Have you ever had surgery on your heart or blood vessels, such as a stent placement, a coronary artery bypass, or surgery on an artery in your head, neck, heart, or legs? YES -    3. Do you have chest pain with activity? No   4. Do you have a history of  heart failure? No   5. Do you currently have a cold, bronchitis or symptoms of other infection? No   6. Do you have a cough, shortness of breath, or wheezing? YES - occasional, chronic   7. Do you or anyone in your family have previous history of blood clots? No   8. Do you or does anyone in your family have a serious bleeding problem such as prolonged bleeding following surgeries or cuts? No   9. Have you ever had problems with anemia or been told to take iron pills? YES -    10. Have you had any abnormal blood loss such as black, tarry or bloody stools, or abnormal vaginal bleeding? No   11. Have you ever had a blood transfusion? YES -    11a. Have you ever had a transfusion reaction? No   12. Are you willing to have a blood transfusion if it is medically needed before, during, or after your surgery? Yes   13. Have you or any of your relatives ever had problems with anesthesia? No   14. Do you have sleep apnea, excessive snoring or daytime drowsiness? YES -    14a. Do you have a CPAP machine? Yes   15. Do you have any artifical heart valves or other implanted medical devices like a pacemaker, defibrillator, or continuous glucose monitor? No   16. Do you have artificial joints? YES -    17. Are you allergic to latex? No       Health Care Directive:  Patient does not have a Health Care Directive or Living Will: Patient states has Advance Directive and will bring in a copy to clinic.    Preoperative Review of :   reviewed - controlled substances reflected in medication list.      Review of Systems  Constitutional, neuro, ENT, endocrine, pulmonary, cardiac, gastrointestinal, genitourinary, musculoskeletal, integument and psychiatric systems  are negative, except as otherwise noted.    Patient Active Problem List    Diagnosis Date Noted     Osteoarthritis 01/29/2016     Priority: High     Rheumatoid arthritis involving both hands with negative rheumatoid factor (H) 01/29/2016     Priority: High     Per her new rheumatologist at [facility] she does not has RA  See note in care every where dated 9/14/2016       High risk medication use 01/29/2016     Priority: High     Lumbar radiculopathy 02/17/2021     Priority: Medium     Back muscle spasm 02/17/2021     Priority: Medium     Non-rheumatic mitral valve stenosis 11/08/2018     Priority: Medium     Coronary artery calcification seen on CAT scan 11/08/2018     Priority: Medium     Atypical chest pain 10/31/2018     Priority: Medium     ACS (acute coronary syndrome) (H) 10/31/2018     Priority: Medium     Tremor 10/04/2018     Priority: Medium     Kidney stone 09/01/2018     Priority: Medium     Nephrolithiasis 08/23/2018     Priority: Medium     Edema of lower extremity 08/06/2018     Priority: Medium     Controlled substance agreement signed 09/16/2016     Priority: Medium     Signed on 6/2016       Other chronic pain 06/28/2016     Priority: Medium     Patient is followed by [facility] for ongoing prescription of pain medication.  All refills should be approved by this provider, or covering partner.    Medication(s): Norco  Maximum quantity per month: # 90   Clinic visit frequency required: Q 3 months     Controlled substance agreement:  [unfilled]  Pain Clinic evaluation in the past: No    DIRE Total Score(s):  No flowsheet data found.    Last [facility] website verification:  none   [email or web address]               Anxiety 02/11/2016     Priority: Medium     Bilateral edema of lower extremity 11/10/2015     Priority: Medium     Essential hypertension 10/13/2015     Priority: Medium     Gastroesophageal reflux disease without esophagitis 10/13/2015     Priority: Medium     Health Care  Home 04/10/2015     Priority: Medium     State Tier Level:  unknown  Status:  Declined  Care Coordinator:  Lauren Hsu    EMERGENCY CARE PLAN  Presenting Problem Signs and Symptoms Treatment Plan    Questions or conerns during clinic hours    I will call the clinic directly     Questions or conerns outside clinic hours    I will call the 24 hour nurse line at [phone]    Patient needs to schedule an appointment    I will call the 24 hour scheduling team at [phone] or clinic directly    Same day treatment     I will call the clinic first, nurse line if after hours, urgent care and express care if needed                          Date:  April 10, 2015         Anemia due to blood loss, acute 04/08/2015     Priority: Medium     S/P total knee arthroplasty 03/25/2015     Priority: Medium     OA (osteoarthritis) of knee      Priority: Medium     Microalbuminuria 04/30/2014     Priority: Medium     ELIGIO on CPAP 10/30/2013     Priority: Medium     Moderate major depression (H) 10/03/2013     Priority: Medium     Ventral hernia 10/28/2012     Priority: Medium     Intermittent asthma 10/28/2012     Priority: Medium     CAD (coronary artery disease) 05/10/2012     Priority: Medium     Type 2 diabetes mellitus with diabetic nephropathy (H) 05/10/2012     Priority: Medium     Transient cerebral ischemia 05/10/2012     Priority: Medium     Diagnosis updated by automated process. Provider to review and confirm.       Hyperlipidemia LDL goal <70 05/10/2012     Priority: Medium     CKD (chronic kidney disease) stage 3, GFR 30-59 ml/min (H)      Priority: Medium     x 2007 atleast       Morbid obesity (H)      Priority: Medium      Past Medical History:   Diagnosis Date     Aortic valve sclerosis     heart murmur, no AS     Arrhythmia     PAT, PVC     Aspirin allergy     Plavix use long term     Asthma      CKD (chronic kidney disease) stage 3, GFR 30-59 ml/min     x 2007 atleast     Congestive heart failure,  unspecified      Depression      Diabetes mellitus (H) 2010     Diastolic dysfunction, left ventricle 2013    grade 2, nl ef     HTN (hypertension)      Lactic acidosis 08/2018    due to dehydration and metformin     Migraine headache      Mitral stenosis     mild, 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     Myocardial infarction (H) 9/2007, cath 2013 ml    BMS: stent to OM, diag, nl EF, echo /C angia 2013 , f/u cath no lesion >40%     Nephrolithiasis     right side     OA (osteoarthritis) of knee      Obesity      Rheumatoid arthritis flare (H)     prednisone     Sleep apnea     restarted using cpap 2017     TIA (transient ischaemic attack)      Ventral hernia, unspecified, without mention of obstruction or gangrene      Past Surgical History:   Procedure Laterality Date     APPENDECTOMY       BIOPSY BREAST      x2 -needle & lumpectomy-benign     CHOLECYSTECTOMY       CORONARY ANGIOGRAPHY ADULT ORDER  9/28/2007    Bare metal stent to OM1, Diagonal patent      CORONARY ANGIOGRAPHY ADULT ORDER  9/25/2007    [place] stent to Diagonal     HC LEFT HEART CATHETERIZATION  8/2013    Moderate CAD     HYSTERECTOMY TOTAL ABDOMINAL       ORTHOPEDIC SURGERY      knee replacement on right side (2006), Left side (2016)     RELEASE CARPAL TUNNEL      right and left     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     Current Outpatient Medications   Medication Sig Dispense Refill     acetaminophen (TYLENOL) 500 MG tablet Take 2 tablets (1,000 mg) by mouth 3 times daily       albuterol (PROAIR HFA/PROVENTIL HFA/VENTOLIN HFA) 108 (90 Base) MCG/ACT inhaler Inhale 2 puffs into the lungs every 6 hours For shortness of breath 1 Inhaler 3     amLODIPine (NORVASC) 5 MG tablet Take 1 tablet (5 mg) by mouth daily       atorvastatin (LIPITOR) 20 MG tablet TAKE 1 TABLET EVERY DAY 90 tablet 2     blood glucose monitoring (NO BRAND SPECIFIED) meter device kit Use to test blood sugar 1-2 times daily or as directed. 1 kit 0     Blood Glucose Monitoring Suppl  (TRUE METRIX AIR GLUCOSE METER) W/DEVICE KIT USE AS DIRECTED 1 kit 0     buPROPion (WELLBUTRIN SR) 100 MG 12 hr tablet Take 1 tablet (100 mg) by mouth every evening       Cholecalciferol (VITAMIN D) 2000 UNITS CAPS Take 2,000 Units by mouth daily        clopidogrel (PLAVIX) 75 MG tablet TAKE 1 TABLET EVERY DAY (NEED MD APPOINTMENT) 90 tablet 1     clotrimazole (LOTRIMIN) 1 % external cream Apply topically 2 times daily 45 g 1     DULoxetine (CYMBALTA) 60 MG capsule Take 1 capsule (60 mg) by mouth daily for depression 90 capsule 1     famotidine (PEPCID) 20 MG tablet TAKE 1 TABLET TWICE DAILY (REPLACES RANITIDINE) (Patient taking differently: Take 20 mg by mouth every evening ) 180 tablet 3     glimepiride (AMARYL) 2 MG tablet Take 1 tablet (2 mg) by mouth every morning (before breakfast) for diabetese 90 tablet 3     IBANdronate (BONIVA) 150 MG tablet TAKE 1 TABLET EVERY 30 DAYS FOR OSTEOPENIA 3 tablet 1     losartan (COZAAR) 50 MG tablet Take 1 tablet (50 mg) by mouth daily for Blood Pressure 90 tablet 3     melatonin 1 MG TABS tablet Take 1 tablet (1 mg) by mouth nightly as needed for sleep       metoprolol succinate ER (TOPROL-XL) 100 MG 24 hr tablet Take 1 tablet (100 mg) by mouth daily 90 tablet 3     nitroGLYcerin (NITROSTAT) 0.4 MG sublingual tablet For chest pain place 1 tablet under the tongue every 5 minutes for 3 doses. If symptoms persist 5 minutes after 1st dose call 911. 25 tablet 0     nystatin-triamcinolone (MYCOLOG II) 541412-3.1 UNIT/GM-% external cream APPLY TOPICALLY DAILY PRN 60 g 1     order for DME Equipment being ordered: diabetic shoes 1 each 0     order for DME DREAMSTATION  5-15 CM/H20  NASAL WISP FABRIC       oxyCODONE (ROXICODONE) 5 MG tablet Take 1 tablet (5 mg) by mouth every 6 hours as needed for moderate to severe pain 10 tablet 0     TRUE METRIX BLOOD GLUCOSE TEST test strip TEST BLOOD GLUCOSE EVERY  strip 1     TRUEPLUS LANCETS 28G MISC 1 each 2 times daily as needed 100  each 3     WIXELA INHUB 100-50 MCG/DOSE inhaler INHALE 1 PUFF EVERY 12 HOURS 180 each 1     fluticasone (FLONASE) 50 MCG/ACT nasal spray Spray 1 spray into both nostrils daily as needed        Lidocaine (LIDOCARE) 4 % Patch Place 1 patch onto the skin every 24 hours To prevent lidocaine toxicity, patient should be patch free for 12 hrs daily. (Patient not taking: Reported on 6/10/2021)       Menthol, Topical Analgesic, (ICY HOT EX) Apply to affected area every morning       polyethylene glycol (MIRALAX) 17 g packet Take 17 g by mouth daily (Patient not taking: Reported on 6/10/2021)       tiZANidine (ZANAFLEX) 2 MG tablet Take 1 tablet (2 mg) by mouth 3 times daily as needed for muscle spasms (Patient not taking: Reported on 6/10/2021) 20 tablet 1       Allergies   Allergen Reactions     Aspirin Hives     Reaction occurred during childhood.      Lisinopril Cough     Metformin      Elevated lactic acid     Minocycline      Yellow Dye Allergy. Minocycline has Yellow Dye #10.     Yellow Dye Hives     Rxn to yellow tablet. Eyes swelled shut.         Social History     Tobacco Use     Smoking status: Former Smoker     Packs/day: 0.25     Types: Cigarettes     Start date:      Quit date: 1973     Years since quittin.1     Smokeless tobacco: Never Used   Substance Use Topics     Alcohol use: Yes     Alcohol/week: 0.0 - 1.0 standard drinks     Comment: rarely     Family History   Problem Relation Age of Onset     Neurologic Disorder Mother         MS - at 60's     C.A.D. Father          at 8o's, ? prostate ca     Breast Cancer No family hx of      Cancer - colorectal No family hx of      History   Drug Use No         Objective     /84 (BP Location: Left arm, Cuff Size: Adult Large)   Pulse 73   Temp 96.6  F (35.9  C) (Tympanic)   Wt 121.6 kg (268 lb)   SpO2 95%   BMI 41.97 kg/m      Physical Exam    GENERAL APPEARANCE: healthy, alert and no distress     EYES: EOMI, sclerae clear     NECK:  supper, no audible bruits     RESP: lungs clear to auscultation - no rales, rhonchi or wheezes     CV: regular rates and rhythm, normal S1 S2, no S3 or S4 and no murmur, click or rub     ABDOMEN:  soft, nontender, no HSM or masses and bowel sounds normal     MS: extremities normal- no gross deformities noted     NEURO:Mentation intact and speech normal     PSYCH: mentation appears normal. and affect normal/bright    Recent Labs   Lab Test 02/18/21  0600 02/17/21  1758 08/24/20  0951 01/20/20  1111   HGB 11.9 13.1  --   --     375  --   --     137 138 137   POTASSIUM 4.2 4.1 4.6 4.6   CR 1.50* 1.69* 1.50* 1.37*   A1C  --   --  6.2* 6.5*        Diagnostics:  No labs were ordered during this visit.  No results found for this or any previous visit (from the past 720 hour(s)).   No EKG required for low risk surgery (cataract, skin procedure, breast biopsy, etc).    Revised Cardiac Risk Index (RCRI):  The patient has the following serious cardiovascular risks for perioperative complications:   - No serious cardiac risks = 0 points     RCRI Interpretation: 0 points: Class I (very low risk - 0.4% complication rate)           Signed Electronically by: Colleen Jeff PA-C  Copy of this evaluation report is provided to requesting physician.

## 2021-06-11 ASSESSMENT — ASTHMA QUESTIONNAIRES: ACT_TOTALSCORE: 21

## 2021-08-01 ENCOUNTER — TRANSFERRED RECORDS (OUTPATIENT)
Dept: MULTI SPECIALTY CLINIC | Facility: CLINIC | Age: 86
End: 2021-08-01

## 2021-08-01 LAB — RETINOPATHY: NEGATIVE

## 2021-08-26 NOTE — PROGRESS NOTES
"SUBJECTIVE:   Moira Andre is a 87 year old female who presents for Preventive Visit.      Patient has been advised of split billing requirements and indicates understanding: Yes   Are you in the first 12 months of your Medicare coverage?  No    Healthy Habits:     In general, how would you rate your overall health?  Fair    Frequency of exercise:  None    Do you usually eat at least 4 servings of fruit and vegetables a day, include whole grains    & fiber and avoid regularly eating high fat or \"junk\" foods?  No    Taking medications regularly:  Not Applicable    Barriers to taking medications:  Not applicable    Ability to successfully perform activities of daily living:  No assistance needed    Home Safety:  No safety concerns identified    Hearing Impairment:  No hearing concerns    In the past 6 months, have you been bothered by leaking of urine? Yes    In general, how would you rate your overall mental or emotional health?  Good      PHQ-2 Total Score: 4    Additional concerns today:  Yes    Do you feel safe in your environment? Yes    Have you ever done Advance Care Planning? (For example, a Health Directive, POLST, or a discussion with a medical provider or your loved ones about your wishes): Yes, advance care planning is on file.       Fall risk  Fallen 2 or more times in the past year?: Yes  Any fall with injury in the past year?: Yes  click delete button to remove this line now  Cognitive Screening   1) Repeat 3 items (Leader, Season, Table)    2) Clock draw: NORMAL  3) 3 item recall: Recalls 3 objects  Results: 3 items recalled: COGNITIVE IMPAIRMENT LESS LIKELY    Mini-CogTM Copyright SATISH Cortez. Licensed by the author for use in [facility]; reprinted with permission (lizbeth@.[facility]). All rights reserved.      Do you have sleep apnea, excessive snoring or daytime drowsiness?: yes    Reviewed and updated as needed this visit by clinical staff  Tobacco  Allergies  Meds  Problems         "     Reviewed and updated as needed this visit by Provider   Allergies  Meds  Problems            Social History     Tobacco Use     Smoking status: Former Smoker     Packs/day: 0.25     Types: Cigarettes     Start date:      Quit date: 1973     Years since quittin.3     Smokeless tobacco: Never Used   Substance Use Topics     Alcohol use: Yes     Alcohol/week: 0.0 - 1.0 standard drinks     Comment: rarely     If you drink alcohol do you typically have >3 drinks per day or >7 drinks per week? No    Alcohol Use 2021   Prescreen: >3 drinks/day or >7 drinks/week? No   Prescreen: >3 drinks/day or >7 drinks/week? -   No flowsheet data found.    Patient reported that she forgot hearing aids at home   She had so much difficulty hearing especially with the mask and face shield  I gave her amplifier which helped her some  I had to repeat things  She still lives in her home  She feels tired and fatigued  She has huge ventral hernia but reluctant to get it fixed  Due to her multiple comorbidities she worries about that complications will happen in postop period.        Current providers sharing in care for this patient include:   Patient Care Team:  Maryan Churchill MD as PCP - General (Internal Medicine)  Maryan Churchill MD as Assigned PCP  Gaurav Santana MD as Assigned Surgical Provider  Margaret Mott, APRN CNP as Assigned Heart and Vascular Provider    The following health maintenance items are reviewed in Epic and correct as of today:  Health Maintenance Due   Topic Date Due     ZOSTER IMMUNIZATION (1 of 2) Never done     EYE EXAM  2020     URINE DRUG SCREEN  2020     ASTHMA ACTION PLAN  08/15/2020     MICROALBUMIN  2020     LIPID  2021     INFLUENZA VACCINE (1) 2021           Pertinent mammograms are reviewed under the imaging tab.    Review of Systems   Constitutional: Negative for chills and fever.   HENT: Negative for congestion, ear pain, hearing loss  "and sore throat.    Eyes: Positive for visual disturbance. Negative for pain.   Respiratory: Positive for cough and shortness of breath.    Cardiovascular: Negative for chest pain, palpitations and peripheral edema.   Gastrointestinal: Positive for abdominal pain, constipation, diarrhea and heartburn. Negative for hematochezia and nausea.   Breasts:  Negative for tenderness, breast mass and discharge.   Genitourinary: Positive for frequency and urgency. Negative for dysuria, genital sores, hematuria, pelvic pain, vaginal bleeding and vaginal discharge.   Musculoskeletal: Negative for arthralgias, joint swelling and myalgias.   Skin: Positive for rash.   Neurological: Positive for weakness and paresthesias. Negative for dizziness and headaches.   Psychiatric/Behavioral: Negative for mood changes. The patient is not nervous/anxious.      Patient has IBS  Not using cpap so feels tired  Cough and sob due to asthma as not using her inhaler   Weakness and paresthesia due to diabetes       OBJECTIVE:   /89   Pulse 82   Temp 97.4  F (36.3  C) (Temporal)   Resp 24   Ht 1.702 m (5' 7\")   Wt 121.1 kg (267 lb)   SpO2 97%   BMI 41.82 kg/m   Estimated body mass index is 41.82 kg/m  as calculated from the following:    Height as of this encounter: 1.702 m (5' 7\").    Weight as of this encounter: 121.1 kg (267 lb).  Physical Exam     Examined in a chair getting to the examination table was difficult for her  GENERAL: healthy, alert and no distress  She uses walker to move around  She is morbidly obese  EYES: Eyes grossly normal to inspection, PERRL and conjunctivae and sclerae normal  HENT: ear canals and TM's normal, nose and mouth without ulcers or lesions  NECK: no adenopathy,  RESP: lungs clear to auscultation - no rales, rhonchi or wheezes  CV: regular rate and rhythm, normal S1 S2, no S3 positive  murmur, click or rub, she has peripheral edema and peripheral pulses strong  ABDOMEN: soft, nontender, no " hepatosplenomegaly, no masses and bowel sounds normal  She has big ventral hernia  MS: She has changes due to osteoarthritis and DJD in her joints  SKIN: no suspicious lesions or rashes  Multiple SK lesion especially on her back and scar of bilateral knee replacement  NEURO:  mentation intact and speech normal  PSYCH: mentation appears normal, affect normal/bright        ASSESSMENT / PLAN:   Moira was seen today for physical.    Diagnoses and all orders for this visit:    It was difficult to communicate with her as she forgot her hearing aids at home  Even with amplifier she had difficulty understanding especially with mask and face shield    Encounter for Medicare annual wellness exam  Preventive health counseling was also done.    Screening for deficiency anemia  -     CBC with platelets; Future  -     CBC with platelets    Essential hypertension  -     metoprolol succinate ER (TOPROL-XL) 100 MG 24 hr tablet; Take 1 tablet (100 mg) by mouth daily  -     losartan (COZAAR) 50 MG tablet; Take 1 tablet (50 mg) by mouth daily for Blood Pressure  -     amLODIPine (NORVASC) 5 MG tablet; Take 1 tablet (5 mg) by mouth daily for Blood Pressure  Blood pressure is on higher side as she did not take morning meds as she was n.p.o.  She was advised she can do that with  Water      Type 2 diabetes mellitus with diabetic nephropathy, without long-term current use of insulin (H)  -     glimepiride (AMARYL) 2 MG tablet; Take 1 tablet (2 mg) by mouth every morning (before breakfast) for diabetese  -     Comprehensive metabolic panel; Future  -     Hemoglobin A1c; Future  -     Lipid panel reflex to direct LDL Fasting; Future  -     TSH with free T4 reflex; Future  -     UA with Microscopic reflex to Culture - lab collect; Future  -     Albumin Random Urine Quantitative with Creat Ratio; Future  -     MD FOOT EXAM NO CHARGE  -     Comprehensive metabolic panel  -     Hemoglobin A1c  -     Lipid panel reflex to direct LDL Fasting  -      TSH with free T4 reflex  -     Cancel: UA with Microscopic reflex to Culture - lab collect  -     Cancel: Albumin Random Urine Quantitative with Creat Ratio  -     Albumin Random Urine Quantitative with Creat Ratio; Future  -     UA with Microscopic reflex to Culture; Future  Educated her about glimepiride  Advised her to hold glimepiride when n.p.o.  Lab Results   Component Value Date    A1C 6.6 08/27/2021    A1C 6.2 08/24/2020    A1C 6.5 01/20/2020    A1C 6.1 09/20/2019    A1C 6.1 06/19/2019    A1C 6.4 03/04/2019       Moderate major depression (H)  -     DULoxetine (CYMBALTA) 60 MG capsule; Take 1 capsule (60 mg) by mouth daily for depression  Cymbalta is helping her  she is on bupropion also    Coronary artery disease involving native coronary artery of native heart without angina pectoris  -     clopidogrel (PLAVIX) 75 MG tablet; Take 1 tablet (75 mg) by mouth daily    SOB (shortness of breath)  -      albuterol (PROAIR HFA/PROVENTIL HFA/VENTOLIN HFA) 108 (90 Base) MCG/ACT inhaler; Inhale 2 puffs into the lungs every 6 hours For shortness of breath  Multifactorial due to obesity, asthma, deconditioning, obesity hypoventilation syndrome plus she has big ventral hernia    Hyperlipidemia LDL goal <70  -     atorvastatin (LIPITOR) 20 MG tablet; Take 1 tablet (20 mg) by mouth daily for cholestrol  -     Lipid panel reflex to direct LDL Fasting; Future  -     Lipid panel reflex to direct LDL Fasting    Lumbar radiculopathy  It is a chronic issue    Ventral hernia without obstruction or gangrene  Patient is still not interested in getting this fixed    Rheumatoid arthritis involving both hands with negative rheumatoid factor (H)  -     Rheumatology Referral; Future  This is a questionable diagnosis  She used to see a rheumatologist    Morbid obesity (H)  She has morbid obesity    Rash  -     triamcinolone (KENALOG) 0.1 % external cream; Apply topically 2 times daily  She has a rash on her feet due to dermatitis  She  "was using antifungal  Prescribed triamcinolone and educated her    Gastroesophageal reflux disease without esophagitis  -     pantoprazole (PROTONIX) 20 MG EC tablet; Take 1 tablet (20 mg) by mouth daily for acid reflux  Pepcid was not helping  Acid reflux could be due to big hernia pushing the stomach and esophagus  Switch her to Protonix    Multiple joint pain  -     Rheumatology Referral; Future  Referred to rheumatology    ELIGIO (obstructive sleep apnea)  Noncompliant with CPAP  Discussed the importance of compliance  That is why she feels tired and fatigued  It affects the heart also    Obesity hypoventilation syndrome (H)  This also makes her shortness of breath    Stage 3a chronic kidney disease  We will continue to monitor    Other orders  -     REVIEW OF HEALTH MAINTENANCE PROTOCOL ORDERS          Patient has been advised of split billing requirements and indicates understanding: Yes  COUNSELING:  Reviewed preventive health counseling, as reflected in patient instructions       Regular exercise       Healthy diet/nutrition       Osteoporosis prevention/bone health    Estimated body mass index is 41.82 kg/m  as calculated from the following:    Height as of this encounter: 1.702 m (5' 7\").    Weight as of this encounter: 121.1 kg (267 lb).    Weight management plan: Discussed healthy diet and exercise guidelines    She reports that she quit smoking about 48 years ago. Her smoking use included cigarettes. She started smoking about 49 years ago. She smoked 0.25 packs per day. She has never used smokeless tobacco.      Appropriate preventive services were discussed with this patient, including applicable screening as appropriate for cardiovascular disease, diabetes, osteopenia/osteoporosis, and glaucoma.  As appropriate for age/gender, discussed screening for colorectal cancer, prostate cancer, breast cancer, and cervical cancer. Checklist reviewing preventive services available has been given to the " patient.    Reviewed patients plan of care and provided an AVS. The Basic Care Plan (routine screening as documented in Health Maintenance) for Moira meets the Care Plan requirement. This Care Plan has been established and reviewed with the Patient.      Disclaimer: This note consists of symbols derived from keyboarding, dictation and/or voice recognition software. As a result, there may be errors in the script that have gone undetected. Please consider this when interpreting information found in this chart.    Counseling Resources:  ATP IV Guidelines  Pooled Cohorts Equation Calculator  Breast Cancer Risk Calculator  Breast Cancer: Medication to Reduce Risk  FRAX Risk Assessment  ICSI Preventive Guidelines  Dietary Guidelines for Americans, 2010  [employer] MyPlate  ASA Prophylaxis  Lung CA Screening    Maryan Churchill MD  [facility]    Identified Health Risks:  Answers for HPI/ROS submitted by the patient on 8/27/2021  If you checked off any problems, how difficult have these problems made it for you to do your work, take care of things at home, or get along with other people?: Somewhat difficult  PHQ9 TOTAL SCORE: 10        The patient was provided with suggestions to help her develop a healthy physical lifestyle.  She is at risk for lack of exercise and has been provided with information to increase physical activity for the benefit of her well-being.  The patient was counseled and encouraged to consider modifying their diet and eating habits. She was provided with information on recommended healthy diet options.  Information on urinary incontinence and treatment options given to patient.

## 2021-08-27 ENCOUNTER — OFFICE VISIT (OUTPATIENT)
Dept: FAMILY MEDICINE | Facility: CLINIC | Age: 86
End: 2021-08-27
Payer: COMMERCIAL

## 2021-08-27 VITALS
HEART RATE: 82 BPM | HEIGHT: 67 IN | OXYGEN SATURATION: 97 % | TEMPERATURE: 97.4 F | BODY MASS INDEX: 41.91 KG/M2 | RESPIRATION RATE: 24 BRPM | WEIGHT: 267 LBS | DIASTOLIC BLOOD PRESSURE: 89 MMHG | SYSTOLIC BLOOD PRESSURE: 139 MMHG

## 2021-08-27 DIAGNOSIS — E78.5 HYPERLIPIDEMIA LDL GOAL <70: ICD-10-CM

## 2021-08-27 DIAGNOSIS — E66.2 OBESITY HYPOVENTILATION SYNDROME (H): ICD-10-CM

## 2021-08-27 DIAGNOSIS — K21.9 GASTROESOPHAGEAL REFLUX DISEASE WITHOUT ESOPHAGITIS: ICD-10-CM

## 2021-08-27 DIAGNOSIS — M54.16 LUMBAR RADICULOPATHY: ICD-10-CM

## 2021-08-27 DIAGNOSIS — M25.50 MULTIPLE JOINT PAIN: ICD-10-CM

## 2021-08-27 DIAGNOSIS — I25.10 CORONARY ARTERY DISEASE INVOLVING NATIVE CORONARY ARTERY OF NATIVE HEART WITHOUT ANGINA PECTORIS: ICD-10-CM

## 2021-08-27 DIAGNOSIS — M06.042 RHEUMATOID ARTHRITIS INVOLVING BOTH HANDS WITH NEGATIVE RHEUMATOID FACTOR (H): ICD-10-CM

## 2021-08-27 DIAGNOSIS — I10 ESSENTIAL HYPERTENSION: ICD-10-CM

## 2021-08-27 DIAGNOSIS — G47.33 OSA (OBSTRUCTIVE SLEEP APNEA): ICD-10-CM

## 2021-08-27 DIAGNOSIS — R06.02 SOB (SHORTNESS OF BREATH): ICD-10-CM

## 2021-08-27 DIAGNOSIS — K43.9 VENTRAL HERNIA WITHOUT OBSTRUCTION OR GANGRENE: ICD-10-CM

## 2021-08-27 DIAGNOSIS — M06.041 RHEUMATOID ARTHRITIS INVOLVING BOTH HANDS WITH NEGATIVE RHEUMATOID FACTOR (H): ICD-10-CM

## 2021-08-27 DIAGNOSIS — N18.31 STAGE 3A CHRONIC KIDNEY DISEASE (H): ICD-10-CM

## 2021-08-27 DIAGNOSIS — E66.01 MORBID OBESITY (H): ICD-10-CM

## 2021-08-27 DIAGNOSIS — E11.21 TYPE 2 DIABETES MELLITUS WITH DIABETIC NEPHROPATHY, WITHOUT LONG-TERM CURRENT USE OF INSULIN (H): ICD-10-CM

## 2021-08-27 DIAGNOSIS — Z13.0 SCREENING FOR DEFICIENCY ANEMIA: ICD-10-CM

## 2021-08-27 DIAGNOSIS — F32.1 MODERATE MAJOR DEPRESSION (H): ICD-10-CM

## 2021-08-27 DIAGNOSIS — R21 RASH: ICD-10-CM

## 2021-08-27 DIAGNOSIS — Z00.00 ENCOUNTER FOR MEDICARE ANNUAL WELLNESS EXAM: Primary | ICD-10-CM

## 2021-08-27 LAB
ERYTHROCYTE [DISTWIDTH] IN BLOOD BY AUTOMATED COUNT: 16.3 % (ref 10–15)
HBA1C MFR BLD: 6.6 % (ref 0–5.6)
HCT VFR BLD AUTO: 44.2 % (ref 35–47)
HGB BLD-MCNC: 14.2 G/DL (ref 11.7–15.7)
MCH RBC QN AUTO: 26.4 PG (ref 26.5–33)
MCHC RBC AUTO-ENTMCNC: 32.1 G/DL (ref 31.5–36.5)
MCV RBC AUTO: 82 FL (ref 78–100)
PLATELET # BLD AUTO: 315 10E3/UL (ref 150–450)
RBC # BLD AUTO: 5.37 10E6/UL (ref 3.8–5.2)
WBC # BLD AUTO: 9.1 10E3/UL (ref 4–11)

## 2021-08-27 PROCEDURE — 80053 COMPREHEN METABOLIC PANEL: CPT | Performed by: INTERNAL MEDICINE

## 2021-08-27 PROCEDURE — 36415 COLL VENOUS BLD VENIPUNCTURE: CPT | Performed by: INTERNAL MEDICINE

## 2021-08-27 PROCEDURE — 84443 ASSAY THYROID STIM HORMONE: CPT | Performed by: INTERNAL MEDICINE

## 2021-08-27 PROCEDURE — 83036 HEMOGLOBIN GLYCOSYLATED A1C: CPT | Performed by: INTERNAL MEDICINE

## 2021-08-27 PROCEDURE — 99214 OFFICE O/P EST MOD 30 MIN: CPT | Mod: 25 | Performed by: INTERNAL MEDICINE

## 2021-08-27 PROCEDURE — 82728 ASSAY OF FERRITIN: CPT | Performed by: INTERNAL MEDICINE

## 2021-08-27 PROCEDURE — 82607 VITAMIN B-12: CPT | Performed by: INTERNAL MEDICINE

## 2021-08-27 PROCEDURE — 85027 COMPLETE CBC AUTOMATED: CPT | Performed by: INTERNAL MEDICINE

## 2021-08-27 PROCEDURE — 99397 PER PM REEVAL EST PAT 65+ YR: CPT | Performed by: INTERNAL MEDICINE

## 2021-08-27 PROCEDURE — 99207 PR FOOT EXAM NO CHARGE: CPT | Mod: 25 | Performed by: INTERNAL MEDICINE

## 2021-08-27 PROCEDURE — 80061 LIPID PANEL: CPT | Performed by: INTERNAL MEDICINE

## 2021-08-27 RX ORDER — LOSARTAN POTASSIUM 50 MG/1
50 TABLET ORAL DAILY
Qty: 90 TABLET | Refills: 3 | Status: SHIPPED | OUTPATIENT
Start: 2021-08-27 | End: 2021-12-21

## 2021-08-27 RX ORDER — ALBUTEROL SULFATE 90 UG/1
2 AEROSOL, METERED RESPIRATORY (INHALATION) EVERY 6 HOURS
Qty: 18 G | Refills: 3 | Status: SHIPPED | OUTPATIENT
Start: 2021-08-27 | End: 2021-08-27

## 2021-08-27 RX ORDER — METOPROLOL SUCCINATE 100 MG/1
100 TABLET, EXTENDED RELEASE ORAL DAILY
Qty: 90 TABLET | Refills: 3 | Status: SHIPPED | OUTPATIENT
Start: 2021-08-27 | End: 2021-09-20

## 2021-08-27 RX ORDER — TRIAMCINOLONE ACETONIDE 1 MG/G
CREAM TOPICAL 2 TIMES DAILY
Qty: 85 G | Refills: 1 | Status: SHIPPED | OUTPATIENT
Start: 2021-08-27 | End: 2022-10-14

## 2021-08-27 RX ORDER — BUPROPION HYDROCHLORIDE 100 MG/1
100 TABLET, EXTENDED RELEASE ORAL DAILY
Qty: 90 TABLET | Refills: 3 | Status: SHIPPED | OUTPATIENT
Start: 2021-08-27 | End: 2022-03-03

## 2021-08-27 RX ORDER — ALBUTEROL SULFATE 90 UG/1
2 AEROSOL, METERED RESPIRATORY (INHALATION) EVERY 6 HOURS
Qty: 54 G | Refills: 1 | Status: ON HOLD | OUTPATIENT
Start: 2021-08-27 | End: 2023-10-13

## 2021-08-27 RX ORDER — PANTOPRAZOLE SODIUM 20 MG/1
20 TABLET, DELAYED RELEASE ORAL DAILY
Qty: 90 TABLET | Refills: 1 | Status: SHIPPED | OUTPATIENT
Start: 2021-08-27 | End: 2022-03-02

## 2021-08-27 RX ORDER — AMLODIPINE BESYLATE 5 MG/1
5 TABLET ORAL DAILY
Qty: 90 TABLET | Refills: 3 | Status: SHIPPED | OUTPATIENT
Start: 2021-08-27 | End: 2023-04-12

## 2021-08-27 RX ORDER — ATORVASTATIN CALCIUM 20 MG/1
20 TABLET, FILM COATED ORAL DAILY
Qty: 90 TABLET | Refills: 3 | Status: SHIPPED | OUTPATIENT
Start: 2021-08-27 | End: 2022-03-03

## 2021-08-27 RX ORDER — GLIMEPIRIDE 2 MG/1
2 TABLET ORAL
Qty: 90 TABLET | Refills: 3 | Status: SHIPPED | OUTPATIENT
Start: 2021-08-27 | End: 2022-04-26 | Stop reason: ALTCHOICE

## 2021-08-27 RX ORDER — DULOXETIN HYDROCHLORIDE 60 MG/1
60 CAPSULE, DELAYED RELEASE ORAL DAILY
Qty: 90 CAPSULE | Refills: 3 | Status: SHIPPED | OUTPATIENT
Start: 2021-08-27 | End: 2021-09-22

## 2021-08-27 RX ORDER — CLOPIDOGREL BISULFATE 75 MG/1
75 TABLET ORAL DAILY
Qty: 90 TABLET | Refills: 3 | Status: SHIPPED | OUTPATIENT
Start: 2021-08-27 | End: 2021-09-20

## 2021-08-27 ASSESSMENT — ENCOUNTER SYMPTOMS
DYSURIA: 0
ABDOMINAL PAIN: 1
SORE THROAT: 0
FEVER: 0
COUGH: 1
CONSTIPATION: 1
NERVOUS/ANXIOUS: 0
SHORTNESS OF BREATH: 1
MYALGIAS: 0
HEADACHES: 0
PARESTHESIAS: 1
ARTHRALGIAS: 0
CHILLS: 0
HEMATOCHEZIA: 0
DIARRHEA: 1
PALPITATIONS: 0
DIZZINESS: 0
HEMATURIA: 0
EYE PAIN: 0
HEARTBURN: 1
NAUSEA: 0
WEAKNESS: 1
BREAST MASS: 0
FREQUENCY: 1
JOINT SWELLING: 0

## 2021-08-27 ASSESSMENT — MIFFLIN-ST. JEOR: SCORE: 1678.73

## 2021-08-27 ASSESSMENT — ACTIVITIES OF DAILY LIVING (ADL): CURRENT_FUNCTION: NO ASSISTANCE NEEDED

## 2021-08-27 ASSESSMENT — PATIENT HEALTH QUESTIONNAIRE - PHQ9
SUM OF ALL RESPONSES TO PHQ QUESTIONS 1-9: 10
10. IF YOU CHECKED OFF ANY PROBLEMS, HOW DIFFICULT HAVE THESE PROBLEMS MADE IT FOR YOU TO DO YOUR WORK, TAKE CARE OF THINGS AT HOME, OR GET ALONG WITH OTHER PEOPLE: SOMEWHAT DIFFICULT
SUM OF ALL RESPONSES TO PHQ QUESTIONS 1-9: 10

## 2021-08-27 NOTE — LETTER
August 31, 2021      Moira ALEGRIA Jes  [address]  [facility] [address]          Mert Pizarro,     This is to inform you regarding your test result.     Vit B12 is on low end of normal.   Take OTC vit B12 500 mcg po daily   Recheck B12 in 3-4 months.         Sincerely,       Dr.Nasima Kerline MD,FACP       Resulted Orders   Comprehensive metabolic panel   Result Value Ref Range    Sodium 137 133 - 144 mmol/L    Potassium 4.5 3.4 - 5.3 mmol/L    Chloride 108 94 - 109 mmol/L    Carbon Dioxide (CO2) 21 20 - 32 mmol/L    Anion Gap 8 3 - 14 mmol/L    Urea Nitrogen 26 7 - 30 mg/dL    Creatinine 1.41 (H) 0.52 - 1.04 mg/dL    Calcium 9.3 8.5 - 10.1 mg/dL    Glucose 127 (H) 70 - 99 mg/dL    Alkaline Phosphatase 70 40 - 150 U/L    AST 16 0 - 45 U/L    ALT 17 0 - 50 U/L    Protein Total 7.7 6.8 - 8.8 g/dL    Albumin 3.6 3.4 - 5.0 g/dL    Bilirubin Total 0.4 0.2 - 1.3 mg/dL    GFR Estimate 34 (L) >60 mL/min/1.73m2      Comment:      As of July 11, 2021, eGFR is calculated by the CKD-EPI creatinine equation, without race adjustment. eGFR can be influenced by muscle mass, exercise, and diet. The reported eGFR is an estimation only and is only applicable if the renal function is stable.   CBC with platelets   Result Value Ref Range    WBC Count 9.1 4.0 - 11.0 10e3/uL    RBC Count 5.37 (H) 3.80 - 5.20 10e6/uL    Hemoglobin 14.2 11.7 - 15.7 g/dL    Hematocrit 44.2 35.0 - 47.0 %    MCV 82 78 - 100 fL    MCH 26.4 (L) 26.5 - 33.0 pg    MCHC 32.1 31.5 - 36.5 g/dL    RDW 16.3 (H) 10.0 - 15.0 %    Platelet Count 315 150 - 450 10e3/uL   Hemoglobin A1c   Result Value Ref Range    Hemoglobin A1C 6.6 (H) 0.0 - 5.6 %      Comment:      Normal <5.7%   Prediabetes 5.7-6.4%    Diabetes 6.5% or higher     Note: Adopted from ADA consensus guidelines.   Lipid panel reflex to direct LDL Fasting   Result Value Ref Range    Cholesterol 192 <200 mg/dL      Comment:      Age 0-19 years  Desirable: <170 mg/dL  Borderline high:  170-199  mg/dl  High:            >199 mg/dl    Age 20 years and older  Desirable: <200 mg/dL    Triglycerides 277 (H) <150 mg/dL      Comment:      0-9 years:  Normal:    Less than 75 mg/dL  Borderline high:  75-99 mg/dL  High:             Greater than or equal to 100 mg/dL    0-19 years:  Normal:    Less than 90 mg/dL  Borderline high:   mg/dL  High:             Greater than or equal to 130 mg/dL    20 years and older:  Normal:    Less than 150 mg/dL  Borderline high:  150-199 mg/dL  High:             200-499 mg/dL  Very high:   Greater than or equal to 500 mg/dL    Direct Measure HDL 53 >=50 mg/dL      Comment:      0-19 years:       Greater than or equal to 45 mg/dL   Low: Less than 40 mg/dL   Borderline low: 40-44 mg/dL     20 years and older:   Female: Greater than or equal to 50 mg/dL   Male:   Greater than or equal to 40 mg/dL         LDL Cholesterol Calculated 84 <=100 mg/dL      Comment:      Age 0-19 years:  Desirable: 0-110 mg/dL   Borderline high: 110-129 mg/dL   High: >= 130 mg/dL    Age 20 years and older:  Desirable: <100mg/dL  Above desirable: 100-129 mg/dL   Borderline high: 130-159 mg/dL   High: 160-189 mg/dL   Very high: >= 190 mg/dL    Non HDL Cholesterol 139 (H) <130 mg/dL      Comment:      0-19 years:  Desirable:          Less than 120 mg/dL  Borderline high:   120-144 mg/dL  High:                   Greater than or equal to 145 mg/dL    20 years and older:  Desirable:          130 mg/dL  Above Desirable: 130-159 mg/dL  Borderline high:   160-189 mg/dL  High:               190-219 mg/dL  Very high:     Greater than or equal to 220 mg/dL    Patient Fasting > 8hrs? Yes    TSH with free T4 reflex   Result Value Ref Range    TSH 1.20 0.40 - 4.00 mU/L   Vitamin B12   Result Value Ref Range    Vitamin B12 435 193 - 986 pg/mL   Ferritin   Result Value Ref Range    Ferritin 156 ng/mL

## 2021-08-27 NOTE — PROGRESS NOTES
The patient was provided with suggestions to help her develop a healthy physical lifestyle.  She is at risk for lack of exercise and has been provided with information to increase physical activity for the benefit of her well-being.  The patient was counseled and encouraged to consider modifying their diet and eating habits. She was provided with information on recommended healthy diet options.  Information on urinary incontinence and treatment options given to patient.  She is at risk for falling and has been provided with information to reduce the risk of falling at home.

## 2021-08-27 NOTE — LETTER
[place] [address] [address] [facility]  [address]  [place]  [address]  Tel: [phone]    August 30, 2021    Moira Andre  [address]  [facility] [address]        Dear Ms. Andre,    This is to inform you regarding your test result.     TSH which is thyroid hormone is normal.   Your total cholesterol is normal.   HDL which is called good cholesterol is normal   Your LDL cholesterol is normal.  This is often call bad cholesterol and high levels increase the risk for heart attacks and strokes.   The triglycerides are high. Lowering  the amount of sugar ,alcohol and sweets in the diet helps to control this.Exercise and weight loss helps.   Creatinine which is kidney function is elevated.   CBC result which includes Hemoglobin and  Platelet Counts is satisfactory   HbA1c which is average glucose during last 3 months is 6.6%   Eat low cholesterol low fat  diet and do regular physical activity.       Lab Results        Component                Value               Date                        A1C                      6.6                 08/27/2021                  A1C                      6.2                 08/24/2020                  A1C                      6.5                 01/20/2020                  A1C                      6.1                 09/20/2019                  A1C                      6.1                 06/19/2019                  A1C                      6.4                 03/04/2019                   Sincerely,    Maryan Churchill MD/JOSÉ MIGUEL          Enclosure: Lab Results  Results for orders placed or performed in visit on 08/27/21   Comprehensive metabolic panel     Status: Abnormal   Result Value Ref Range    Sodium 137 133 - 144 mmol/L    Potassium 4.5 3.4 - 5.3 mmol/L    Chloride 108 94 - 109 mmol/L    Carbon Dioxide (CO2) 21 20 - 32 mmol/L    Anion Gap 8 3 - 14 mmol/L    Urea Nitrogen 26 7 - 30 mg/dL    Creatinine 1.41 (H) 0.52 - 1.04 mg/dL    Calcium 9.3 8.5 - 10.1  mg/dL    Glucose 127 (H) 70 - 99 mg/dL    Alkaline Phosphatase 70 40 - 150 U/L    AST 16 0 - 45 U/L    ALT 17 0 - 50 U/L    Protein Total 7.7 6.8 - 8.8 g/dL    Albumin 3.6 3.4 - 5.0 g/dL    Bilirubin Total 0.4 0.2 - 1.3 mg/dL    GFR Estimate 34 (L) >60 mL/min/1.73m2   CBC with platelets     Status: Abnormal   Result Value Ref Range    WBC Count 9.1 4.0 - 11.0 10e3/uL    RBC Count 5.37 (H) 3.80 - 5.20 10e6/uL    Hemoglobin 14.2 11.7 - 15.7 g/dL    Hematocrit 44.2 35.0 - 47.0 %    MCV 82 78 - 100 fL    MCH 26.4 (L) 26.5 - 33.0 pg    MCHC 32.1 31.5 - 36.5 g/dL    RDW 16.3 (H) 10.0 - 15.0 %    Platelet Count 315 150 - 450 10e3/uL   Hemoglobin A1c     Status: Abnormal   Result Value Ref Range    Hemoglobin A1C 6.6 (H) 0.0 - 5.6 %   Lipid panel reflex to direct LDL Fasting     Status: Abnormal   Result Value Ref Range    Cholesterol 192 <200 mg/dL    Triglycerides 277 (H) <150 mg/dL    Direct Measure HDL 53 >=50 mg/dL    LDL Cholesterol Calculated 84 <=100 mg/dL    Non HDL Cholesterol 139 (H) <130 mg/dL    Patient Fasting > 8hrs? Yes    TSH with free T4 reflex     Status: Normal   Result Value Ref Range    TSH 1.20 0.40 - 4.00 mU/L   Ferritin     Status: None   Result Value Ref Range    Ferritin 156 ng/mL

## 2021-08-27 NOTE — PATIENT INSTRUCTIONS
Labs today  Use triamcinolone cream twice a day on rash   If no improvement then I will refer you to skin specialist  Make appointment with rheumatology  Discontinue famotidine   Take pantoprazole for stomach  Use your Cpap machine regularly  Follow up in one month  Seek sooner medical attention if there is any worsening of symptoms or problems.        Patient Education   Personalized Prevention Plan  You are due for the preventive services outlined below.  Your care team is available to assist you in scheduling these services.  If you have already completed any of these items, please share that information with your care team to update in your medical record.  Health Maintenance Due   Topic Date Due     ANNUAL REVIEW OF HM ORDERS  Never done     Zoster (Shingles) Vaccine (1 of 2) Never done     Diabetic Foot Exam  03/04/2020     Eye Exam  05/01/2020     URINE DRUG SCREEN  06/19/2020     Asthma Action Plan - yearly  08/15/2020     Kidney Microalbumin Urine Test  09/20/2020     A1C Lab  02/24/2021     Depression Assessment  02/24/2021     Cholesterol Lab  08/24/2021     Flu Vaccine (1) 09/01/2021     Your Health Risk Assessment indicates you feel you are not in good health    A healthy lifestyle helps keep the body fit and the mind alert. It helps protect you from disease, helps you fight disease, and helps prevent chronic disease (disease that doesn't go away) from getting worse. This is important as you get older and begin to notice twinges in muscles and joints and a decline in the strength and stamina you once took for granted. A healthy lifestyle includes good healthcare, good nutrition, weight control, recreation, and regular exercise. Avoid harmful substances and do what you can to keep safe. Another part of a healthy lifestyle is stay mentally active and socially involved.    Good healthcare     Have a wellness visit every year.     If you have new symptoms, let us know right away. Don't wait until the next  checkup.     Take medicines exactly as prescribed and keep your medicines in a safe place. Tell us if your medicine causes problems.   Healthy diet and weight control     Eat 3 or 4 small, nutritious, low-fat, high-fiber meals a day. Include a variety of fruits, vegetables, and whole-grain foods.     Make sure you get enough calcium in your diet. Calcium, vitamin D, and exercise help prevent osteoporosis (bone thinning).     If you live alone, try eating with others when you can. That way you get a good meal and have company while you eat it.     Try to keep a healthy weight. If you eat more calories than your body uses for energy, it will be stored as fat and you will gain weight.     Recreation   Recreation is not limited to sports and team events. It includes any activity that provides relaxation, interest, enjoyment, and exercise. Recreation provides an outlet for physical, mental, and social energy. It can give a sense of worth and achievement. It can help you stay healthy.    Mental Exercise and Social Involvement  Mental and emotional health is as important as physical health. Keep in touch with friends and family. Stay as active as possible. Continue to learn and challenge yourself.   Things you can do to stay mentally active are:    Learn something new, like a foreign language or musical instrument.     Play SCRABBLE or do crossword puzzles. If you cannot find people to play these games with you at home, you can play them with others on your computer through the Internet.     Join a games club--anything from card games to chess or checkers or lawn bowling.     Start a new hobby.     Go back to school.     Volunteer.     Read.   Keep up with world events.    Exercise for a Healthier Heart  You may wonder how you can improve the health of your heart. If you re thinking about exercise, you re on the right track. You don t need to become an athlete. But you do need a certain amount of brisk exercise to help  strengthen your heart. If you have been diagnosed with a heart condition, your healthcare provider may advise exercise to help stabilize your condition. To help make exercise a habit, choose safe, fun activities.      Exercise with a friend. When activity is fun, you're more likely to stick with it.   Before you start  Check with your healthcare provider before starting an exercise program. This is especially important if you have not been active for a while. It's also important if you have a long-term (chronic) health problem such as heart disease, diabetes, or obesity. Or if you are at high risk for having these problems.   Why exercise?  Exercising regularly offers many healthy rewards. It can help you do all of the following:     Improve your blood cholesterol level to help prevent further heart trouble    Lower your blood pressure to help prevent a stroke or heart attack    Control diabetes, or reduce your risk of getting this disease    Improve your heart and lung function    Reach and stay at a healthy weight    Make your muscles stronger so you can stay active    Prevent falls and fractures by slowing the loss of bone mass (osteoporosis)    Manage stress better    Reduce your blood pressure    Improve your sense of self and your body image  Exercise tips      Ease into your routine. Set small goals. Then build on them. If you are not sure what your activity level should be, talk with your healthcare provider first before starting an exercise routine.    Exercise on most days. Aim for a total of 150 minutes (2 hours and 30 minutes) or more of moderate-intensity aerobic activity each week. Or 75 minutes (1 hour and 15 minutes) or more of vigorous-intensity aerobic activity each week. Or try for a combination of both. Moderate activity means that you breathe heavier and your heart rate increases but you can still talk. Think about doing 40 minutes of moderate exercise, 3 to 4 times a week. For best results,  activity should last for about 40 minutes to lower blood pressure and cholesterol. It's OK to work up to the 40-minute period over time. Examples of moderate-intensity activity are walking 1 mile in 15 minutes. Or doing 30 to 45 minutes of yard work.    Step up your daily activity level.  Along with your exercise program, try being more active the whole day. Walk instead of drive. Or park further away so that you take more steps each day. Do more household tasks or yard work. You may not be able to meet the advised mount of physical activity. But doing some moderate- or vigorous-intensity aerobic activity can help reduce your risk for heart disease. Your healthcare provider can help you figure out what is best for you.    Choose 1 or more activities you enjoy.  Walking is one of the easiest things you can do. You can also try swimming, riding a bike, dancing, or taking an exercise class.    When to call your healthcare provider  Call your healthcare provider if you have any of these:     Chest pain or feel dizzy or lightheaded    Burning, tightness, pressure, or heaviness in your chest, neck, shoulders, back, or arms    Abnormal shortness of breath    More joint or muscle pain    A very fast or irregular heartbeat (palpitations)  [employer] last reviewed this educational content on 7/1/2019 2000-2021 [employer]. All rights reserved. This information is not intended as a substitute for professional medical care. Always follow your healthcare professional's instructions.          Understanding USDA MyPlate  The USDA has guidelines to help you make healthy food choices. These are called MyPlate. MyPlate shows the food groups that make up healthy meals using the image of a place setting. Before you eat, think about the healthiest choices for what to put on your plate or in your cup or bowl. To learn more about building a healthy plate, visit www.choosemyplate.gov.    The food groups    Fruits. Any fruit  or 100% fruit juice counts as part of the Fruit Group. Fruits may be fresh, canned, frozen, or dried, and may be whole, cut-up, or pureed. Make 1/2 of your plate fruits and vegetables.    Vegetables. Any vegetable or 100% vegetable juice counts as a member of the Vegetable Group. Vegetables may be fresh, frozen, canned, or dried. They can be served raw or cooked and may be whole, cut-up, or mashed. Make 1/2 of your plate fruits and vegetables.    Grains. All foods made from grains are part of the Grains Group. These include wheat, rice, oats, cornmeal, and barley. Grains are often used to make foods such as bread, pasta, oatmeal, cereal, tortillas, and grits. Grains should be no more than 1/4 of your plate. At least half of your grains should be whole grains.    Protein. This group includes meat, poultry, seafood, beans and peas, eggs, processed soy products (such as tofu), nuts (including nut butters), and seeds. Make protein choices no more than 1/4 of your plate. Meat and poultry choices should be lean or low fat.    Dairy. The Dairy Group includes all fluid milk products and foods made from milk that contain calcium, such as yogurt and cheese. (Foods that have little calcium, such as cream, butter, and cream cheese, are not part of this group.) Most dairy choices should be low-fat or fat-free.    Oils. Oils aren't a food group, but they do contain essential nutrients. However it's important to watch your intake of oils. These are fats that are liquid at room temperature. They include canola, corn, olive, soybean, vegetable, and sunflower oil. Foods that are mainly oil include mayonnaise, certain salad dressings, and soft margarines. You likely already get your daily oil allowance from the foods you eat.  Things to limit  Eating healthy also means limiting these things in your diet:       Salt (sodium). Many processed foods have a lot of sodium. To keep sodium intake down, eat fresh vegetables, meats, poultry,  and seafood when possible. Purchase low-sodium, reduced-sodium, or no-salt-added food products at the store. And don't add salt to your meals at home. Instead, season them with herbs and spices such as dill, oregano, cumin, and paprika. Or try adding flavor with lemon or lime zest and juice.    Saturated fat. Saturated fats are most often found in animal products such as beef, pork, and chicken. They are often solid at room temperature, such as butter. To reduce your saturated fat intake, choose leaner cuts of meat and poultry. And try healthier cooking methods such as grilling, broiling, roasting, or baking. For a simple lower-fat swap, use plain nonfat yogurt instead of mayonnaise when making potato salad or macaroni salad.    Added sugars. These are sugars added to foods. They are in foods such as ice cream, candy, soda, fruit drinks, sports drinks, energy drinks, cookies, pastries, jams, and syrups. Cut down on added sugars by sharing sweet treats with a family member or friend. You can also choose fruit for dessert, and drink water or other unsweetened beverages.     [employer] last reviewed this educational content on 6/1/2020 2000-2021 [employer]. All rights reserved. This information is not intended as a substitute for professional medical care. Always follow your healthcare professional's instructions.          Urinary Incontinence, Female (Adult)   Urinary incontinence means loss of bladder control. This problem affects many women, especially as they get older. If you have incontinence, you may be embarrassed to ask for help. But know that this problem can be treated.   Types of Incontinence  There are different types of incontinence. Two of the main types are described here. You can have more than one type.     Stress incontinence. With this type, urine leaks when pressure (stress) is put on the bladder. This may happen when you cough, sneeze, or laugh. Stress incontinence most often  occurs because the pelvic floor muscles that support the bladder and urethra are weak. This can happen after pregnancy and vaginal childbirth or a hysterectomy. It can also be due to excess body weight or hormone changes.    Urge incontinence (also called overactive bladder). With this type, a sudden urge to urinate is felt often. This may happen even though there may not be much urine in the bladder. The need to urinate often during the night is common. Urge incontinence most often occurs because of bladder spasms. This may be due to bladder irritation or infection. Damage to bladder nerves or pelvic muscles, constipation, and certain medicines can also lead to urge incontinence.  Treatment depends on the cause. Further evaluation is needed to find the type you have. This will likely include an exam and certain tests. Based on the results, you and your healthcare provider can then plan treatment. Until a diagnosis is made, the home care tips below can help ease symptoms.   Home care    Do pelvic floor muscle exercises, if they are prescribed. The pelvic floor muscles help support the bladder and urethra. Many women find that their symptoms improve when doing special exercises that strengthen these muscles. To do the exercises, contract the muscles you would use to stop your stream of urine. But do this when you re not urinating. Hold for 10 seconds, then relax. Repeat 10 to 20 times in a row, at least 3 times a day. Your healthcare provider may give you other instructions for how to do the exercises and how often.    Keep a bladder diary. This helps track how often and how much you urinate over a set period of time. Bring this diary with you to your next visit with the provider. The information can help your provider learn more about your bladder problem.    Lose weight, if advised to by your provider. Extra weight puts pressure on the bladder. Your provider can help you create a weight-loss plan that s right for  you. This may include exercising more and making certain diet changes.    Don't have foods and drinks that may irritate the bladder. These can include alcohol and caffeinated drinks.    Quit smoking. Smoking and other tobacco use can lead to a long-term (chronic) cough that strains the pelvic floor muscles. Smoking may also damage the bladder and urethra. Talk with your provider about treatments or methods you can use to quit smoking.    If drinking large amounts of fluid makes you have symptoms, you may be advised to limit your fluid intake. You may also be advised to drink most of your fluids during the day and to limit fluids at night.    If you re worried about urine leakage or accidents, you may wear absorbent pads to catch urine. Change the pads often. This helps reduce discomfort. It may also reduce the risk of skin or bladder infections.    Follow-up care  Follow up with your healthcare provider, or as directed. It may take some to find the right treatment for your problem. But healthy lifestyle changes can be made right away. These include such things as exercising on a regular basis, eating a healthy diet, losing weight (if needed), and quitting smoking. Your treatment plan may include special therapies or medicines. Certain procedures or surgery may also be options. Talk about any questions you have with your provider.   When to seek medical advice  Call the healthcare provider right away if any of these occur:    Fever of 100.4 F (38 C) or higher, or as directed by your provider    Bladder pain or fullness    Belly swelling    Nausea or vomiting    Back pain    Weakness, dizziness, or fainting  [employer] last reviewed this educational content on 1/1/2020 2000-2021 [employer]. All rights reserved. This information is not intended as a substitute for professional medical care. Always follow your healthcare professional's instructions.           At your visit today, we discussed your risk  for falls and preventive options.    Fall Prevention  Falls often occur due to slipping, tripping or losing your balance. Millions of people fall every year and injure themselves. Here are ways to reduce your risk of falling again.     Think about your fall, was there anything that caused your fall that can be fixed, removed, or replaced?    Make your home safe by keeping walkways clear of objects you may trip over, such as electric cords.    Use non-slip pads under rugs. Don't use area rugs or small throw rugs.    Use non-slip mats in bathtubs and showers.    Install handrails and lights on staircases. The handrails should be on both sides of the stairs.    Don't walk in poorly lit areas.    Don't stand on chairs or wobbly ladders.    Use caution when reaching overhead or looking upward. This position can cause a loss of balance.    Be sure your shoes fit properly, have non-slip bottoms and are in good condition.     Wear shoes both inside and out. Don't go barefoot or wear slippers.    Be cautious when going up and down stairs, curbs, and when walking on uneven sidewalks.    If your balance is poor, consider using a cane or walker.    If your fall was related to alcohol use, stop or limit alcohol intake.     If your fall was related to use of sleeping medicines, talk to your healthcare provider about this. You may need to reduce your dosage at bedtime if you awaken during the night to go to the bathroom.      To reduce the need for nighttime bathroom trips:  ? Don't drink fluids for several hours before going to bed  ? Empty your bladder before going to bed  ? Men can keep a urinal at the bedside    Stay as active as you can. Balance, flexibility, strength, and endurance all come from exercise. They all play a role in preventing falls. Ask your healthcare provider which types of activity are right for you.    Get your vision checked on a regular basis.    If you have pets, know where they are before you stand up  or walk so you don't trip over them.    Use night lights.    Go over all your medicines with a pharmacist or other healthcare provider to see if any of them could make you more likely to fall.  [employer] last reviewed this educational content on 4/1/2018 2000-2021 [employer]. All rights reserved. This information is not intended as a substitute for professional medical care. Always follow your healthcare professional's instructions.        Patient Education   Personalized Prevention Plan  You are due for the preventive services outlined below.  Your care team is available to assist you in scheduling these services.  If you have already completed any of these items, please share that information with your care team to update in your medical record.  Health Maintenance Due   Topic Date Due     ANNUAL REVIEW OF HM ORDERS  Never done     Zoster (Shingles) Vaccine (1 of 2) Never done     Diabetic Foot Exam  03/04/2020     Eye Exam  05/01/2020     URINE DRUG SCREEN  06/19/2020     Asthma Action Plan - yearly  08/15/2020     Kidney Microalbumin Urine Test  09/20/2020     A1C Lab  02/24/2021     Depression Assessment  02/24/2021     Cholesterol Lab  08/24/2021     Flu Vaccine (1) 09/01/2021     Your Health Risk Assessment indicates you feel you are not in good health    A healthy lifestyle helps keep the body fit and the mind alert. It helps protect you from disease, helps you fight disease, and helps prevent chronic disease (disease that doesn't go away) from getting worse. This is important as you get older and begin to notice twinges in muscles and joints and a decline in the strength and stamina you once took for granted. A healthy lifestyle includes good healthcare, good nutrition, weight control, recreation, and regular exercise. Avoid harmful substances and do what you can to keep safe. Another part of a healthy lifestyle is stay mentally active and socially involved.    Good healthcare     Have a  wellness visit every year.     If you have new symptoms, let us know right away. Don't wait until the next checkup.     Take medicines exactly as prescribed and keep your medicines in a safe place. Tell us if your medicine causes problems.   Healthy diet and weight control     Eat 3 or 4 small, nutritious, low-fat, high-fiber meals a day. Include a variety of fruits, vegetables, and whole-grain foods.     Make sure you get enough calcium in your diet. Calcium, vitamin D, and exercise help prevent osteoporosis (bone thinning).     If you live alone, try eating with others when you can. That way you get a good meal and have company while you eat it.     Try to keep a healthy weight. If you eat more calories than your body uses for energy, it will be stored as fat and you will gain weight.     Recreation   Recreation is not limited to sports and team events. It includes any activity that provides relaxation, interest, enjoyment, and exercise. Recreation provides an outlet for physical, mental, and social energy. It can give a sense of worth and achievement. It can help you stay healthy.    Mental Exercise and Social Involvement  Mental and emotional health is as important as physical health. Keep in touch with friends and family. Stay as active as possible. Continue to learn and challenge yourself.   Things you can do to stay mentally active are:    Learn something new, like a foreign language or musical instrument.     Play SCRABBLE or do crossword puzzles. If you cannot find people to play these games with you at home, you can play them with others on your computer through the Internet.     Join a games club--anything from card games to chess or checkers or lawn bowling.     Start a new hobby.     Go back to school.     Volunteer.     Read.   Keep up with world events.    Exercise for a Healthier Heart  You may wonder how you can improve the health of your heart. If you re thinking about exercise, you re on the right  track. You don t need to become an athlete. But you do need a certain amount of brisk exercise to help strengthen your heart. If you have been diagnosed with a heart condition, your healthcare provider may advise exercise to help stabilize your condition. To help make exercise a habit, choose safe, fun activities.      Exercise with a friend. When activity is fun, you're more likely to stick with it.   Before you start  Check with your healthcare provider before starting an exercise program. This is especially important if you have not been active for a while. It's also important if you have a long-term (chronic) health problem such as heart disease, diabetes, or obesity. Or if you are at high risk for having these problems.   Why exercise?  Exercising regularly offers many healthy rewards. It can help you do all of the following:     Improve your blood cholesterol level to help prevent further heart trouble    Lower your blood pressure to help prevent a stroke or heart attack    Control diabetes, or reduce your risk of getting this disease    Improve your heart and lung function    Reach and stay at a healthy weight    Make your muscles stronger so you can stay active    Prevent falls and fractures by slowing the loss of bone mass (osteoporosis)    Manage stress better    Reduce your blood pressure    Improve your sense of self and your body image  Exercise tips      Ease into your routine. Set small goals. Then build on them. If you are not sure what your activity level should be, talk with your healthcare provider first before starting an exercise routine.    Exercise on most days. Aim for a total of 150 minutes (2 hours and 30 minutes) or more of moderate-intensity aerobic activity each week. Or 75 minutes (1 hour and 15 minutes) or more of vigorous-intensity aerobic activity each week. Or try for a combination of both. Moderate activity means that you breathe heavier and your heart rate increases but you can  still talk. Think about doing 40 minutes of moderate exercise, 3 to 4 times a week. For best results, activity should last for about 40 minutes to lower blood pressure and cholesterol. It's OK to work up to the 40-minute period over time. Examples of moderate-intensity activity are walking 1 mile in 15 minutes. Or doing 30 to 45 minutes of yard work.    Step up your daily activity level.  Along with your exercise program, try being more active the whole day. Walk instead of drive. Or park further away so that you take more steps each day. Do more household tasks or yard work. You may not be able to meet the advised mount of physical activity. But doing some moderate- or vigorous-intensity aerobic activity can help reduce your risk for heart disease. Your healthcare provider can help you figure out what is best for you.    Choose 1 or more activities you enjoy.  Walking is one of the easiest things you can do. You can also try swimming, riding a bike, dancing, or taking an exercise class.    When to call your healthcare provider  Call your healthcare provider if you have any of these:     Chest pain or feel dizzy or lightheaded    Burning, tightness, pressure, or heaviness in your chest, neck, shoulders, back, or arms    Abnormal shortness of breath    More joint or muscle pain    A very fast or irregular heartbeat (palpitations)  [employer] last reviewed this educational content on 7/1/2019 2000-2021 [employer]. All rights reserved. This information is not intended as a substitute for professional medical care. Always follow your healthcare professional's instructions.          Understanding USDA MyPlate  The USDA has guidelines to help you make healthy food choices. These are called MyPlate. MyPlate shows the food groups that make up healthy meals using the image of a place setting. Before you eat, think about the healthiest choices for what to put on your plate or in your cup or bowl. To learn more  about building a healthy plate, visit www.choosemyplate.gov.    The food groups    Fruits. Any fruit or 100% fruit juice counts as part of the Fruit Group. Fruits may be fresh, canned, frozen, or dried, and may be whole, cut-up, or pureed. Make 1/2 of your plate fruits and vegetables.    Vegetables. Any vegetable or 100% vegetable juice counts as a member of the Vegetable Group. Vegetables may be fresh, frozen, canned, or dried. They can be served raw or cooked and may be whole, cut-up, or mashed. Make 1/2 of your plate fruits and vegetables.    Grains. All foods made from grains are part of the Grains Group. These include wheat, rice, oats, cornmeal, and barley. Grains are often used to make foods such as bread, pasta, oatmeal, cereal, tortillas, and grits. Grains should be no more than 1/4 of your plate. At least half of your grains should be whole grains.    Protein. This group includes meat, poultry, seafood, beans and peas, eggs, processed soy products (such as tofu), nuts (including nut butters), and seeds. Make protein choices no more than 1/4 of your plate. Meat and poultry choices should be lean or low fat.    Dairy. The Dairy Group includes all fluid milk products and foods made from milk that contain calcium, such as yogurt and cheese. (Foods that have little calcium, such as cream, butter, and cream cheese, are not part of this group.) Most dairy choices should be low-fat or fat-free.    Oils. Oils aren't a food group, but they do contain essential nutrients. However it's important to watch your intake of oils. These are fats that are liquid at room temperature. They include canola, corn, olive, soybean, vegetable, and sunflower oil. Foods that are mainly oil include mayonnaise, certain salad dressings, and soft margarines. You likely already get your daily oil allowance from the foods you eat.  Things to limit  Eating healthy also means limiting these things in your diet:       Salt (sodium). Many  processed foods have a lot of sodium. To keep sodium intake down, eat fresh vegetables, meats, poultry, and seafood when possible. Purchase low-sodium, reduced-sodium, or no-salt-added food products at the store. And don't add salt to your meals at home. Instead, season them with herbs and spices such as dill, oregano, cumin, and paprika. Or try adding flavor with lemon or lime zest and juice.    Saturated fat. Saturated fats are most often found in animal products such as beef, pork, and chicken. They are often solid at room temperature, such as butter. To reduce your saturated fat intake, choose leaner cuts of meat and poultry. And try healthier cooking methods such as grilling, broiling, roasting, or baking. For a simple lower-fat swap, use plain nonfat yogurt instead of mayonnaise when making potato salad or macaroni salad.    Added sugars. These are sugars added to foods. They are in foods such as ice cream, candy, soda, fruit drinks, sports drinks, energy drinks, cookies, pastries, jams, and syrups. Cut down on added sugars by sharing sweet treats with a family member or friend. You can also choose fruit for dessert, and drink water or other unsweetened beverages.     [employer] last reviewed this educational content on 6/1/2020 2000-2021 [employer]. All rights reserved. This information is not intended as a substitute for professional medical care. Always follow your healthcare professional's instructions.          Urinary Incontinence, Female (Adult)   Urinary incontinence means loss of bladder control. This problem affects many women, especially as they get older. If you have incontinence, you may be embarrassed to ask for help. But know that this problem can be treated.   Types of Incontinence  There are different types of incontinence. Two of the main types are described here. You can have more than one type.     Stress incontinence. With this type, urine leaks when pressure (stress) is  put on the bladder. This may happen when you cough, sneeze, or laugh. Stress incontinence most often occurs because the pelvic floor muscles that support the bladder and urethra are weak. This can happen after pregnancy and vaginal childbirth or a hysterectomy. It can also be due to excess body weight or hormone changes.    Urge incontinence (also called overactive bladder). With this type, a sudden urge to urinate is felt often. This may happen even though there may not be much urine in the bladder. The need to urinate often during the night is common. Urge incontinence most often occurs because of bladder spasms. This may be due to bladder irritation or infection. Damage to bladder nerves or pelvic muscles, constipation, and certain medicines can also lead to urge incontinence.  Treatment depends on the cause. Further evaluation is needed to find the type you have. This will likely include an exam and certain tests. Based on the results, you and your healthcare provider can then plan treatment. Until a diagnosis is made, the home care tips below can help ease symptoms.   Home care    Do pelvic floor muscle exercises, if they are prescribed. The pelvic floor muscles help support the bladder and urethra. Many women find that their symptoms improve when doing special exercises that strengthen these muscles. To do the exercises, contract the muscles you would use to stop your stream of urine. But do this when you re not urinating. Hold for 10 seconds, then relax. Repeat 10 to 20 times in a row, at least 3 times a day. Your healthcare provider may give you other instructions for how to do the exercises and how often.    Keep a bladder diary. This helps track how often and how much you urinate over a set period of time. Bring this diary with you to your next visit with the provider. The information can help your provider learn more about your bladder problem.    Lose weight, if advised to by your provider. Extra weight  puts pressure on the bladder. Your provider can help you create a weight-loss plan that s right for you. This may include exercising more and making certain diet changes.    Don't have foods and drinks that may irritate the bladder. These can include alcohol and caffeinated drinks.    Quit smoking. Smoking and other tobacco use can lead to a long-term (chronic) cough that strains the pelvic floor muscles. Smoking may also damage the bladder and urethra. Talk with your provider about treatments or methods you can use to quit smoking.    If drinking large amounts of fluid makes you have symptoms, you may be advised to limit your fluid intake. You may also be advised to drink most of your fluids during the day and to limit fluids at night.    If you re worried about urine leakage or accidents, you may wear absorbent pads to catch urine. Change the pads often. This helps reduce discomfort. It may also reduce the risk of skin or bladder infections.    Follow-up care  Follow up with your healthcare provider, or as directed. It may take some to find the right treatment for your problem. But healthy lifestyle changes can be made right away. These include such things as exercising on a regular basis, eating a healthy diet, losing weight (if needed), and quitting smoking. Your treatment plan may include special therapies or medicines. Certain procedures or surgery may also be options. Talk about any questions you have with your provider.   When to seek medical advice  Call the healthcare provider right away if any of these occur:    Fever of 100.4 F (38 C) or higher, or as directed by your provider    Bladder pain or fullness    Belly swelling    Nausea or vomiting    Back pain    Weakness, dizziness, or fainting  [employer] last reviewed this educational content on 1/1/2020 2000-2021 [employer]. All rights reserved. This information is not intended as a substitute for professional medical care. Always follow  your healthcare professional's instructions.          Preventing Falls at Home  A person can fall for many reasons. Older adults may fall because reaction time slows down as we age. Your muscles and joints may get stiff, weak, or less flexible because of illness, medicines, or a physical condition.   Other health problems that make falls more likely include:     Arthritis    Dizziness or lightheadedness when you stand up (orthostatic hypotension)    History of a stroke    Dizziness    Anemia    Certain medicines taken for mental illness or to control blood pressure.    Problems with balance or gait    Bladder or urinary problems    History of falling    Changes in vision (vision impairment)    Changes in thinking skills and memory (cognitive impairment)  Injuries from a fall can include serious injuries such as broken bones, dislocated joints, internal bleeding and cuts. Injuries like these can limit your independence.   Prevention tips  To help prevent falls and fall-related injuries, follow the tips below.    Floors  To make floors safer:     Put nonskid pads under area rugs.    Remove small rugs.    Replace worn floor coverings.    Tack carpets firmly to each step on carpeted stairs. Put nonskid strips on the edges of uncarpeted stairs.    Keep floors and stairs free of clutter and cords.    Arrange furniture so there are clear pathways.    Clean up any spills right away.  Bathrooms    To make bathrooms safer:     Install grab bars in the tub or shower.    Apply nonskid strips or put a nonskid rubber mat in the tub or shower.    Sit on a bath chair to bathe.    Use bathmats with nonskid backing.  Lighting  To improve visibility in your home:      Keep a flashlight in each room. Or put a lamp next to the bed within easy reach.    Put nightlights in the bedrooms, hallways, kitchen, and bathrooms.    Make sure all stairways have good lighting.    Take your time when going up and down stairs.    Put handrails on both  sides of stairs and in walkways for more support. To prevent injury to your wrist or arm, don t use handrails to pull yourself up.    Install grab bars to pull yourself up.    Move or rearrange items that you use often. This will make them easier to find or reach.    Look at your home to find any safety hazards. Especially look at doorways, walkways, and the driveway. Remove or repair any safety problems that you find.  Other changes to make    Look around to find any safety hazards. Look closely at doorways, walkways, and the driveway. Remove or repair any safety problems that you find.    Wear shoes that fit well.    Take your time when going up and down stairs.    Put handrails on both sides of stairs and in walkways for more support. To prevent injury to your wrist or arm, don t use handrails to pull yourself up.    Install grab bars wherever needed to pull yourself up.    Arrange items that you use often. This will make them easier to find or reach.    [employer] last reviewed this educational content on 3/1/2020    7254-1843 [employer]. All rights reserved. This information is not intended as a substitute for professional medical care. Always follow your healthcare professional's instructions.           At your visit today, we discussed your risk for falls and preventive options.    Fall Prevention  Falls often occur due to slipping, tripping or losing your balance. Millions of people fall every year and injure themselves. Here are ways to reduce your risk of falling again.     Think about your fall, was there anything that caused your fall that can be fixed, removed, or replaced?    Make your home safe by keeping walkways clear of objects you may trip over, such as electric cords.    Use non-slip pads under rugs. Don't use area rugs or small throw rugs.    Use non-slip mats in bathtubs and showers.    Install handrails and lights on staircases. The handrails should be on both sides of the  stairs.    Don't walk in poorly lit areas.    Don't stand on chairs or wobbly ladders.    Use caution when reaching overhead or looking upward. This position can cause a loss of balance.    Be sure your shoes fit properly, have non-slip bottoms and are in good condition.     Wear shoes both inside and out. Don't go barefoot or wear slippers.    Be cautious when going up and down stairs, curbs, and when walking on uneven sidewalks.    If your balance is poor, consider using a cane or walker.    If your fall was related to alcohol use, stop or limit alcohol intake.     If your fall was related to use of sleeping medicines, talk to your healthcare provider about this. You may need to reduce your dosage at bedtime if you awaken during the night to go to the bathroom.      To reduce the need for nighttime bathroom trips:  ? Don't drink fluids for several hours before going to bed  ? Empty your bladder before going to bed  ? Men can keep a urinal at the bedside    Stay as active as you can. Balance, flexibility, strength, and endurance all come from exercise. They all play a role in preventing falls. Ask your healthcare provider which types of activity are right for you.    Get your vision checked on a regular basis.    If you have pets, know where they are before you stand up or walk so you don't trip over them.    Use night lights.    Go over all your medicines with a pharmacist or other healthcare provider to see if any of them could make you more likely to fall.  [email or web address] last reviewed this educational content on 4/1/2018 2000-2021 [employer]. All rights reserved. This information is not intended as a substitute for professional medical care. Always follow your healthcare professional's instructions.

## 2021-08-28 LAB
ALBUMIN SERPL-MCNC: 3.6 G/DL (ref 3.4–5)
ALP SERPL-CCNC: 70 U/L (ref 40–150)
ALT SERPL W P-5'-P-CCNC: 17 U/L (ref 0–50)
ANION GAP SERPL CALCULATED.3IONS-SCNC: 8 MMOL/L (ref 3–14)
AST SERPL W P-5'-P-CCNC: 16 U/L (ref 0–45)
BILIRUB SERPL-MCNC: 0.4 MG/DL (ref 0.2–1.3)
BUN SERPL-MCNC: 26 MG/DL (ref 7–30)
CALCIUM SERPL-MCNC: 9.3 MG/DL (ref 8.5–10.1)
CHLORIDE BLD-SCNC: 108 MMOL/L (ref 94–109)
CHOLEST SERPL-MCNC: 192 MG/DL
CO2 SERPL-SCNC: 21 MMOL/L (ref 20–32)
CREAT SERPL-MCNC: 1.41 MG/DL (ref 0.52–1.04)
FASTING STATUS PATIENT QL REPORTED: YES
GFR SERPL CREATININE-BSD FRML MDRD: 34 ML/MIN/1.73M2
GLUCOSE BLD-MCNC: 127 MG/DL (ref 70–99)
HDLC SERPL-MCNC: 53 MG/DL
LDLC SERPL CALC-MCNC: 84 MG/DL
NONHDLC SERPL-MCNC: 139 MG/DL
POTASSIUM BLD-SCNC: 4.5 MMOL/L (ref 3.4–5.3)
PROT SERPL-MCNC: 7.7 G/DL (ref 6.8–8.8)
SODIUM SERPL-SCNC: 137 MMOL/L (ref 133–144)
TRIGL SERPL-MCNC: 277 MG/DL
TSH SERPL DL<=0.005 MIU/L-ACNC: 1.2 MU/L (ref 0.4–4)

## 2021-08-30 LAB
FERRITIN SERPL-MCNC: 156 NG/ML
VIT B12 SERPL-MCNC: 435 PG/ML (ref 193–986)

## 2021-08-30 NOTE — RESULT ENCOUNTER NOTE
Please notify patient by sending following letter with copy of test results      Mert Pizarro,    This is to inform you regarding your test result.    TSH which is thyroid hormone is normal.  Your total cholesterol is normal.  HDL which is called good cholesterol is normal  Your LDL cholesterol is normal.  This is often call bad cholesterol and high levels increase the risk for heart attacks and strokes.  The triglycerides are high. Lowering  the amount of sugar ,alcohol and sweets in the diet helps to control this.Exercise and weight loss helps.  Creatinine which is kidney function is elevated.  CBC result which includes Hemoglobin and  Platelet Counts is satisfactory   HbA1c which is average glucose during last 3 months is 6.6%  Eat low cholesterol low fat  diet and do regular physical activity.      Lab Results       Component                Value               Date                       A1C                      6.6                 08/27/2021                 A1C                      6.2                 08/24/2020                 A1C                      6.5                 01/20/2020                 A1C                      6.1                 09/20/2019                 A1C                      6.1                 06/19/2019                 A1C                      6.4                 03/04/2019                Sincerely,      Dr.Nasima Kerline MD,FACP

## 2021-08-31 NOTE — RESULT ENCOUNTER NOTE
Please notify patient by sending following letter with copy of test results      Mert Pizarro,    This is to inform you regarding your test result.    Vit B12 is on low end of normal.  Take OTC vit B12 500 mcg po daily  Recheck B12 in 3-4 months.        Sincerely,      Dr.Nasima Kerline MD,FACP

## 2021-09-01 ENCOUNTER — LAB (OUTPATIENT)
Dept: LAB | Facility: CLINIC | Age: 86
End: 2021-09-01
Payer: COMMERCIAL

## 2021-09-01 DIAGNOSIS — E11.21 TYPE 2 DIABETES MELLITUS WITH DIABETIC NEPHROPATHY, WITHOUT LONG-TERM CURRENT USE OF INSULIN (H): ICD-10-CM

## 2021-09-01 LAB
ALBUMIN UR-MCNC: 100 MG/DL
APPEARANCE UR: CLEAR
BACTERIA #/AREA URNS HPF: ABNORMAL /HPF
BILIRUB UR QL STRIP: NEGATIVE
COLOR UR AUTO: YELLOW
GLUCOSE UR STRIP-MCNC: NEGATIVE MG/DL
HGB UR QL STRIP: ABNORMAL
KETONES UR STRIP-MCNC: NEGATIVE MG/DL
LEUKOCYTE ESTERASE UR QL STRIP: ABNORMAL
NITRATE UR QL: NEGATIVE
PH UR STRIP: 5.5 [PH] (ref 5–7)
RBC #/AREA URNS AUTO: ABNORMAL /HPF
SP GR UR STRIP: >=1.03 (ref 1–1.03)
SQUAMOUS #/AREA URNS AUTO: ABNORMAL /LPF
UROBILINOGEN UR STRIP-ACNC: 0.2 E.U./DL
WBC #/AREA URNS AUTO: ABNORMAL /HPF
WBC CLUMPS #/AREA URNS HPF: PRESENT /HPF

## 2021-09-01 PROCEDURE — 87086 URINE CULTURE/COLONY COUNT: CPT

## 2021-09-01 PROCEDURE — 81001 URINALYSIS AUTO W/SCOPE: CPT

## 2021-09-01 PROCEDURE — 82043 UR ALBUMIN QUANTITATIVE: CPT

## 2021-09-01 NOTE — LETTER
[address] [facility]  [address]  [place]  [address]  Tel: [phone]    September 3, 2021    Moira Andre  [address] [facility] [address]  [place] [address]        Dear Ms. Andre,    This is to inform you regarding your test result.     Your urine test was positive for infection but urine culture was negative   Urine for protein is positive   The numbers are going up   Tight control of blood pressure is important   Follow-up appointment in 3 months and will repeat the test       Sincerely,       Dr.Nasima Kerline MD,FACP/SML         Enclosure: Lab Results  Results for orders placed or performed in visit on 09/01/21   Albumin Random Urine Quantitative with Creat Ratio     Status: Abnormal   Result Value Ref Range    Creatinine Urine mg/dL 159 mg/dL    Albumin Urine mg/L 413 mg/L    Albumin Urine mg/g Cr 259.75 (H) 0.00 - 25.00 mg/g Cr   UA with Microscopic reflex to Culture     Status: Abnormal    Specimen: Urine, Clean Catch   Result Value Ref Range    Color Urine Yellow Colorless, Straw, Light Yellow, Yellow    Appearance Urine Clear Clear    Glucose Urine Negative Negative mg/dL    Bilirubin Urine Negative Negative    Ketones Urine Negative Negative mg/dL    Specific Gravity Urine >=1.030 1.003 - 1.035    Blood Urine Trace (A) Negative    pH Urine 5.5 5.0 - 7.0    Protein Albumin Urine 100  (A) Negative mg/dL    Urobilinogen Urine 0.2 0.2, 1.0 E.U./dL    Nitrite Urine Negative Negative    Leukocyte Esterase Urine Small (A) Negative   Urine Microscopic     Status: Abnormal   Result Value Ref Range    Bacteria Urine Few (A) None Seen /HPF    RBC Urine 0-2 0-2 /HPF /HPF    WBC Urine 10-25 (A) 0-5 /HPF /HPF    Squamous Epithelials Urine Few (A) None Seen /LPF    WBC Clumps Urine Present (A) None Seen /HPF   Urine Culture     Status: None    Specimen: Urine, Clean Catch   Result Value Ref Range    Culture >100,000 CFU/mL Mixture of urogenital tyler     Narrative    Multiple  morphotypes present with no predominant organism.  Growth consistent with probable contamination during collection.  Suggest repeat specimen if clinically indicated.

## 2021-09-02 LAB
BACTERIA UR CULT: NORMAL
CREAT UR-MCNC: 159 MG/DL
MICROALBUMIN UR-MCNC: 413 MG/L
MICROALBUMIN/CREAT UR: 259.75 MG/G CR (ref 0–25)

## 2021-09-03 NOTE — RESULT ENCOUNTER NOTE
Please notify patient by sending following letter with copy of test results      Mert Pizarro,    This is to inform you regarding your test result.    Your urine test was positive for infection but urine culture was negative  Urine for protein is positive  The numbers are going up  Tight control of blood pressure is important  Follow-up appointment in 3 months and will repeat the test      Sincerely,      Dr.Nasima Kerline MD,FACP

## 2021-09-08 DIAGNOSIS — M54.16 LUMBAR RADICULOPATHY: Primary | ICD-10-CM

## 2021-09-08 RX ORDER — OXYCODONE HYDROCHLORIDE 5 MG/1
5 TABLET ORAL 2 TIMES DAILY PRN
Qty: 20 TABLET | Refills: 0 | Status: SHIPPED | OUTPATIENT
Start: 2021-09-08 | End: 2022-03-03

## 2021-09-08 RX ORDER — OXYCODONE HYDROCHLORIDE 5 MG/1
5 TABLET ORAL EVERY 6 HOURS PRN
Qty: 12 TABLET | Refills: 0 | Status: CANCELLED | OUTPATIENT
Start: 2021-09-08 | End: 2021-09-11

## 2021-09-08 NOTE — TELEPHONE ENCOUNTER
Patient requesting. pain medications. Last prescribed 3/20/20    Xiomy Tellez RN  [facility]

## 2021-09-08 NOTE — TELEPHONE ENCOUNTER
Routing refill request to provider for review/approval because:  Drug not on the G refill protocol     Pending Prescriptions:                       Disp   Refills    oxyCODONE (ROXICODONE) 5 MG tablet         20 tab*0        Sig: Take 1 tablet (5 mg) by mouth every 6 hours as needed           for pain    Last Written Prescription Date:  3-  Last Fill Quantity: 20,  # refills: 0   Last office visit: 8/27/2021 with prescribing provider:  Dr. Churchill   Future Office Visit:   Next 5 appointments (look out 90 days)      Oct 01, 2021 10:30 AM  Office Visit with Maryan Churchill MD  [facility] ([facility] [address], [address]  [facility] [address] [phone]             Xiomy Tellez, RN  [facility] Triage Nurse

## 2021-09-10 ENCOUNTER — TELEPHONE (OUTPATIENT)
Dept: CARDIOLOGY | Facility: CLINIC | Age: 86
End: 2021-09-10

## 2021-09-10 NOTE — TELEPHONE ENCOUNTER
"Pt calling to \"inform\" Dr. Sinha that she is having her annual spinal injection and will be holding clopidogrel.  This was approved through Dr. Churchill's office. Will let Dr. Sinha know.  Per 06/2020 note - pt on plavix for hx TIA not for CAD rx  "

## 2021-09-20 DIAGNOSIS — I10 ESSENTIAL HYPERTENSION: ICD-10-CM

## 2021-09-20 DIAGNOSIS — I25.10 CORONARY ARTERY DISEASE INVOLVING NATIVE CORONARY ARTERY OF NATIVE HEART WITHOUT ANGINA PECTORIS: ICD-10-CM

## 2021-09-20 RX ORDER — METOPROLOL SUCCINATE 100 MG/1
100 TABLET, EXTENDED RELEASE ORAL DAILY
Qty: 90 TABLET | Refills: 2 | Status: SHIPPED | OUTPATIENT
Start: 2021-09-20 | End: 2022-03-03

## 2021-09-20 RX ORDER — CLOPIDOGREL BISULFATE 75 MG/1
75 TABLET ORAL DAILY
Qty: 90 TABLET | Refills: 2 | Status: SHIPPED | OUTPATIENT
Start: 2021-09-20 | End: 2022-06-07

## 2021-09-20 NOTE — TELEPHONE ENCOUNTER
Pt called     Rxs went to [facility]     Wanted to [facility] instead    Rxs approved per pharmacy change    Lucia MCKNIGHT RN

## 2021-11-26 DIAGNOSIS — E11.21 TYPE 2 DIABETES MELLITUS WITH DIABETIC NEPHROPATHY, WITHOUT LONG-TERM CURRENT USE OF INSULIN (H): ICD-10-CM

## 2021-11-26 DIAGNOSIS — E11.21 TYPE 2 DIABETES MELLITUS WITH DIABETIC NEPHROPATHY (H): ICD-10-CM

## 2021-11-26 RX ORDER — DIPHENHYDRAMINE HCL 25 MG
1 TABLET ORAL SEE ADMIN INSTRUCTIONS
Qty: 1 KIT | Refills: 0 | Status: SHIPPED | OUTPATIENT
Start: 2021-11-26 | End: 2023-04-12

## 2021-11-26 RX ORDER — CALCIUM CITRATE/VITAMIN D3 200MG-6.25
100 TABLET ORAL DAILY
Qty: 100 STRIP | Refills: 1 | Status: SHIPPED | OUTPATIENT
Start: 2021-11-26 | End: 2023-04-12

## 2021-12-18 DIAGNOSIS — I10 ESSENTIAL HYPERTENSION: ICD-10-CM

## 2021-12-21 RX ORDER — LOSARTAN POTASSIUM 50 MG/1
TABLET ORAL
Qty: 90 TABLET | Refills: 1 | Status: SHIPPED | OUTPATIENT
Start: 2021-12-21 | End: 2022-03-03

## 2021-12-21 NOTE — TELEPHONE ENCOUNTER
losartan (COZAAR) 50 MG tablet 90 tablet 3 8/27/2021  No   Sig - Route: Take 1 tablet (50 mg) by mouth daily for Blood Pressure - Oral   Sent to pharmacy as: Losartan Potassium 50 MG Oral Tablet (COZAAR)   Class: E-Prescribe   Order: [id number]   E-Prescribing Status: Receipt confirmed by pharmacy (8/27/2021 10:53 AM CDT)       Printout Tracking    External Result Report     Medication Administration Instructions    for Blood Pressure     Pharmacy    [id number] - [facility] [address] AT [facility] [address]     Rx approved per pharmacy change     Taniya Littlejohn RN  [facility]

## 2022-01-14 ENCOUNTER — PRE VISIT (OUTPATIENT)
Dept: RHEUMATOLOGY | Facility: CLINIC | Age: 87
End: 2022-01-14
Payer: COMMERCIAL

## 2022-01-14 NOTE — TELEPHONE ENCOUNTER
NOTES Status Details   OFFICE NOTE from referring provider Internal 08.27.2021 Maryan Churchill MD   OFFICE NOTE from other specialist N/A    DISCHARGE SUMMARY from hospital N/A    DISCHARGE REPORT from the ER N/A    MEDICATION LIST Internal    LABS (Any and all labs)      Care Everywhere /Internal    Biopsy/pathology (Anything related to diagnoses I.e. fluid aspirations, lip biopsy, muscle biopsy)      N/A     N/A    Imaging (All imaging related to diagnoses)     Echo N/A    HRCT N/A    CXR N/A    EMG N/A                   Scleroderma/Dermatomyositis diagnoses     Previous Cardiology notes  N/A    Previous Pulmonary notes N/A    Previous Dermatology notes N/A    Previous GI notes N/A    Lupus diagnoses     Previous Nephrology notes N/A    Previous Dermatology notes N/A    Previous Cardiology notes N/A

## 2022-01-26 ENCOUNTER — OFFICE VISIT (OUTPATIENT)
Dept: URGENT CARE | Facility: URGENT CARE | Age: 87
End: 2022-01-26
Payer: COMMERCIAL

## 2022-01-26 VITALS
OXYGEN SATURATION: 96 % | BODY MASS INDEX: 41.82 KG/M2 | HEART RATE: 100 BPM | SYSTOLIC BLOOD PRESSURE: 173 MMHG | DIASTOLIC BLOOD PRESSURE: 99 MMHG | WEIGHT: 267 LBS

## 2022-01-26 DIAGNOSIS — L08.9 LOCAL INFECTION OF WOUND: Primary | ICD-10-CM

## 2022-01-26 DIAGNOSIS — T14.8XXA LOCAL INFECTION OF WOUND: Primary | ICD-10-CM

## 2022-01-26 PROCEDURE — 99213 OFFICE O/P EST LOW 20 MIN: CPT | Performed by: PHYSICIAN ASSISTANT

## 2022-01-26 RX ORDER — SULFAMETHOXAZOLE/TRIMETHOPRIM 800-160 MG
1 TABLET ORAL 2 TIMES DAILY
Qty: 20 TABLET | Refills: 0 | Status: SHIPPED | OUTPATIENT
Start: 2022-01-26 | End: 2022-02-05

## 2022-01-26 NOTE — PATIENT INSTRUCTIONS
Patient was educated on the natural course of injury.  Apply vaseline to burn. Wash area with soap and water. Conservative measures discussed including over-the-counter Tylenol as needed for pain. See your primary care provider in 5 days if there is no improvement or sooner as needed. Seek emergency care if you develop fever, streaking, severe pain or rapidly spreading redness.

## 2022-01-26 NOTE — PROGRESS NOTES
URGENT CARE VISIT:    SUBJECTIVE:   Chief Complaint   Patient presents with     Laceration     infected cut on rt leg, last week      Moira Andre is a 88 year old female who presents with a chief complaint of right leg swelling and redness for a few days. She burned leg on corner of oven door last week.   Pain exacerbated by touch. Relieved by rest.  She treated it initially with vaseline and bandaid. This is the first time this type of injury has occurred to this patient.     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     Myocardial infarction (H) 9/2007, cath 2013 ml    BMS: stent to OM, diag, nl EF, echo /C angia 2013 , f/u cath no lesion >40%     Nephrolithiasis     right side     OA (osteoarthritis) of knee      Obesity      Rheumatoid arthritis flare (H)     prednisone     Sleep apnea     restarted using cpap 2017     TIA (transient ischaemic attack)      Ventral hernia, unspecified, without mention of obstruction or gangrene      Allergies: Aspirin, Lisinopril, Metformin, Minocycline, Salicylates, Yellow dye, and Yellow dyes   Medications:   Current Outpatient Medications   Medication Sig Dispense Refill     acetaminophen (TYLENOL) 500 MG tablet Take 2 tablets (1,000 mg) by mouth 3 times daily       albuterol (PROAIR HFA/PROVENTIL HFA/VENTOLIN HFA) 108 (90 Base) MCG/ACT inhaler INHALE 2 PUFFS INTO THE LUNGS EVERY 6 HOURS FOR SHORTNESS OF BREATH 54 g 1     amLODIPine (NORVASC) 5 MG  tablet Take 1 tablet (5 mg) by mouth daily for Blood Pressure 90 tablet 3     atorvastatin (LIPITOR) 20 MG tablet Take 1 tablet (20 mg) by mouth daily for cholestrol 90 tablet 3     blood glucose (TRUE METRIX BLOOD GLUCOSE TEST) test strip 100 strips by In Vitro route daily Use to test blood sugar once times daily or as directed. 100 strip 1     Blood Glucose Calibration (TRUE METRIX LEVEL 1) Low SOLN 1 each daily as needed (to calibrate blood glucose machine) 1 each 3     blood glucose monitoring (NO BRAND SPECIFIED) meter device kit Use to test blood sugar 1-2 times daily or as directed. 1 kit 0     Blood Glucose Monitoring Suppl (TRUE METRIX AIR GLUCOSE METER) w/Device KIT 1 Units See Admin Instructions 1 kit 0     buPROPion (WELLBUTRIN SR) 100 MG 12 hr tablet Take 1 tablet (100 mg) by mouth daily for mood 90 tablet 3     Cholecalciferol (VITAMIN D) 2000 UNITS CAPS Take 2,000 Units by mouth daily        clopidogrel (PLAVIX) 75 MG tablet Take 1 tablet (75 mg) by mouth daily 90 tablet 2     DULoxetine (CYMBALTA) 60 MG capsule Take 1 capsule (60 mg) by mouth daily 90 capsule 3     fluticasone (FLONASE) 50 MCG/ACT nasal spray Spray 1 spray into both nostrils daily as needed        fluticasone-salmeterol (ADVAIR) 100-50 MCG/DOSE inhaler INHALE 1 PUFF EVERY 12 HOURS 60 each 3     glimepiride (AMARYL) 2 MG tablet Take 1 tablet (2 mg) by mouth every morning (before breakfast) for diabetese 90 tablet 3     IBANdronate (BONIVA) 150 MG tablet TAKE 1 TABLET EVERY 30 DAYS FOR OSTEOPENIA 3 tablet 1     Lidocaine (LIDOCARE) 4 % Patch Place 1 patch onto the skin every 24 hours To prevent lidocaine toxicity, patient should be patch free for 12 hrs daily.       losartan (COZAAR) 50 MG tablet TAKE 1 TABLET EVERY DAY FOR BLOOD PRESSURE 90 tablet 1     Menthol, Topical Analgesic, (ICY HOT EX) Apply to affected area every morning       metoprolol succinate ER (TOPROL-XL) 100 MG 24 hr tablet Take 1 tablet (100 mg) by mouth daily 90  tablet 2     nitroGLYcerin (NITROSTAT) 0.4 MG sublingual tablet For chest pain place 1 tablet under the tongue every 5 minutes for 3 doses. If symptoms persist 5 minutes after 1st dose call 911. 25 tablet 0     order for DME Equipment being ordered: diabetic shoes 1 each 0     order for DME DREAMSTATION  5-15 CM/H20  NASAL WISP [employer]RIC       oxyCODONE (ROXICODONE) 5 MG tablet Take 1 tablet (5 mg) by mouth 2 times daily as needed for pain 20 tablet 0     pantoprazole (PROTONIX) 20 MG EC tablet Take 1 tablet (20 mg) by mouth daily for acid reflux 90 tablet 1     polyethylene glycol (MIRALAX) 17 g packet Take 17 g by mouth daily       sulfamethoxazole-trimethoprim (BACTRIM DS) 800-160 MG tablet Take 1 tablet by mouth 2 times daily for 10 days 20 tablet 0     triamcinolone (KENALOG) 0.1 % external cream Apply topically 2 times daily 85 g 1     TRUEplus Lancets 33G MISC 1 each 2 times daily 100 each 0     Social History:   Social History     Tobacco Use     Smoking status: Former Smoker     Packs/day: 0.25     Types: Cigarettes     Start date:      Quit date: 1973     Years since quittin.7     Smokeless tobacco: Never Used   Substance Use Topics     Alcohol use: Yes     Alcohol/week: 0.0 - 1.0 standard drinks     Comment: rarely       ROS:  Review of systems negative except as stated above.    OBJECTIVE:  BP (!) 173/99 (BP Location: Right arm, Patient Position: Sitting, Cuff Size: Adult Regular)   Pulse 100   Wt 121.1 kg (267 lb)   SpO2 96%   BMI 41.82 kg/m    GENERAL APPEARANCE: healthy, alert and no distress  MUSCULOSKELETAL:mild TTP over left lower shin. Gait normal.  EXTREMITIES: peripheral pulses normal  SKIN: v shaped scab with surrounding erythema and edema  NEURO: sensation intact         ASSESSMENT:    ICD-10-CM    1. Local infection of wound  T14.8XXA sulfamethoxazole-trimethoprim (BACTRIM DS) 800-160 MG tablet    L08.9        PLAN:  Patient Instructions   Patient was educated on the natural  course of injury.  Apply vaseline to burn. Wash area with soap and water. Bactrim DS prescribed due to multiple allergies. Conservative measures discussed including over-the-counter Tylenol as needed for pain. See your primary care provider in 5 days if there is no improvement or sooner as needed. Seek emergency care if you develop fever, streaking, severe pain or rapidly spreading redness.     Patient verbalized understanding and is agreeable to plan. The patient was discharged ambulatory and in stable condition.    Anjali Raman PA-C on 1/26/2022 at 11:15 AM

## 2022-02-16 ENCOUNTER — HOSPITAL ENCOUNTER (EMERGENCY)
Facility: CLINIC | Age: 87
Discharge: HOME | End: 2022-02-16
Attending: EMERGENCY MEDICINE | Admitting: EMERGENCY MEDICINE
Payer: COMMERCIAL

## 2022-02-16 ENCOUNTER — OFFICE VISIT (OUTPATIENT)
Dept: URGENT CARE | Facility: URGENT CARE | Age: 87
End: 2022-02-16
Payer: COMMERCIAL

## 2022-02-16 VITALS
DIASTOLIC BLOOD PRESSURE: 78 MMHG | TEMPERATURE: 97.5 F | RESPIRATION RATE: 22 BRPM | HEART RATE: 128 BPM | OXYGEN SATURATION: 97 % | SYSTOLIC BLOOD PRESSURE: 158 MMHG

## 2022-02-16 VITALS
TEMPERATURE: 97 F | BODY MASS INDEX: 42.38 KG/M2 | OXYGEN SATURATION: 95 % | WEIGHT: 270 LBS | RESPIRATION RATE: 22 BRPM | HEART RATE: 84 BPM | DIASTOLIC BLOOD PRESSURE: 95 MMHG | HEIGHT: 67 IN | SYSTOLIC BLOOD PRESSURE: 149 MMHG

## 2022-02-16 DIAGNOSIS — N18.30 STAGE 3 CHRONIC KIDNEY DISEASE, UNSPECIFIED WHETHER STAGE 3A OR 3B CKD (H): ICD-10-CM

## 2022-02-16 DIAGNOSIS — R00.0 TACHYCARDIA: ICD-10-CM

## 2022-02-16 DIAGNOSIS — L53.9 ERYTHEMATOUS CONDITION: Primary | ICD-10-CM

## 2022-02-16 DIAGNOSIS — R00.0 SINUS TACHYCARDIA: ICD-10-CM

## 2022-02-16 LAB
ANION GAP SERPL CALCULATED.3IONS-SCNC: 7 MMOL/L (ref 3–14)
ANION GAP SERPL CALCULATED.3IONS-SCNC: 8 MMOL/L (ref 3–14)
ATRIAL RATE - MUSE: 105 BPM
ATRIAL RATE - MUSE: 133 BPM
BASOPHILS # BLD AUTO: 0.1 10E3/UL (ref 0–0.2)
BASOPHILS NFR BLD AUTO: 1 %
BUN SERPL-MCNC: 28 MG/DL (ref 7–30)
BUN SERPL-MCNC: 29 MG/DL (ref 7–30)
CALCIUM SERPL-MCNC: 9.3 MG/DL (ref 8.5–10.1)
CALCIUM SERPL-MCNC: 9.3 MG/DL (ref 8.5–10.1)
CHLORIDE BLD-SCNC: 104 MMOL/L (ref 94–109)
CHLORIDE BLD-SCNC: 106 MMOL/L (ref 94–109)
CO2 SERPL-SCNC: 21 MMOL/L (ref 20–32)
CO2 SERPL-SCNC: 22 MMOL/L (ref 20–32)
CREAT SERPL-MCNC: 1.23 MG/DL (ref 0.52–1.04)
CREAT SERPL-MCNC: 1.28 MG/DL (ref 0.52–1.04)
D DIMER PPP FEU-MCNC: 0.61 UG/ML FEU (ref 0–0.5)
DIASTOLIC BLOOD PRESSURE - MUSE: NORMAL MMHG
DIASTOLIC BLOOD PRESSURE - MUSE: NORMAL MMHG
EOSINOPHIL # BLD AUTO: 0.2 10E3/UL (ref 0–0.7)
EOSINOPHIL NFR BLD AUTO: 1 %
ERYTHROCYTE [DISTWIDTH] IN BLOOD BY AUTOMATED COUNT: 15.6 % (ref 10–15)
GFR SERPL CREATININE-BSD FRML MDRD: 40 ML/MIN/1.73M2
GFR SERPL CREATININE-BSD FRML MDRD: 42 ML/MIN/1.73M2
GLUCOSE BLD-MCNC: 156 MG/DL (ref 70–99)
GLUCOSE BLD-MCNC: 177 MG/DL (ref 70–99)
HCT VFR BLD AUTO: 46.4 % (ref 35–47)
HGB BLD-MCNC: 14.3 G/DL (ref 11.7–15.7)
HOLD SPECIMEN: NORMAL
IMM GRANULOCYTES # BLD: 0.1 10E3/UL
IMM GRANULOCYTES NFR BLD: 1 %
INTERPRETATION ECG - MUSE: NORMAL
INTERPRETATION ECG - MUSE: NORMAL
LYMPHOCYTES # BLD AUTO: 2.4 10E3/UL (ref 0.8–5.3)
LYMPHOCYTES NFR BLD AUTO: 23 %
MCH RBC QN AUTO: 26.4 PG (ref 26.5–33)
MCHC RBC AUTO-ENTMCNC: 30.8 G/DL (ref 31.5–36.5)
MCV RBC AUTO: 86 FL (ref 78–100)
MONOCYTES # BLD AUTO: 0.8 10E3/UL (ref 0–1.3)
MONOCYTES NFR BLD AUTO: 7 %
NEUTROPHILS # BLD AUTO: 7.2 10E3/UL (ref 1.6–8.3)
NEUTROPHILS NFR BLD AUTO: 67 %
NRBC # BLD AUTO: 0 10E3/UL
NRBC BLD AUTO-RTO: 0 /100
NT-PROBNP SERPL-MCNC: 861 PG/ML (ref 0–1800)
P AXIS - MUSE: NORMAL DEGREES
P AXIS - MUSE: NORMAL DEGREES
PLATELET # BLD AUTO: 320 10E3/UL (ref 150–450)
POTASSIUM BLD-SCNC: 4.6 MMOL/L (ref 3.4–5.3)
POTASSIUM BLD-SCNC: 5.6 MMOL/L (ref 3.4–5.3)
PR INTERVAL - MUSE: NORMAL MS
PR INTERVAL - MUSE: NORMAL MS
QRS DURATION - MUSE: 102 MS
QRS DURATION - MUSE: 88 MS
QT - MUSE: 296 MS
QT - MUSE: 406 MS
QTC - MUSE: 459 MS
QTC - MUSE: 504 MS
R AXIS - MUSE: -20 DEGREES
R AXIS - MUSE: -29 DEGREES
RBC # BLD AUTO: 5.42 10E6/UL (ref 3.8–5.2)
SODIUM SERPL-SCNC: 132 MMOL/L (ref 133–144)
SODIUM SERPL-SCNC: 136 MMOL/L (ref 133–144)
SYSTOLIC BLOOD PRESSURE - MUSE: NORMAL MMHG
SYSTOLIC BLOOD PRESSURE - MUSE: NORMAL MMHG
T AXIS - MUSE: 114 DEGREES
T AXIS - MUSE: 82 DEGREES
TROPONIN I SERPL HS-MCNC: 19 NG/L
TROPONIN I SERPL HS-MCNC: 20 NG/L
TSH SERPL DL<=0.005 MIU/L-ACNC: 2.51 MU/L (ref 0.4–4)
VENTRICULAR RATE- MUSE: 145 BPM
VENTRICULAR RATE- MUSE: 93 BPM
WBC # BLD AUTO: 10.7 10E3/UL (ref 4–11)
WBC # BLD AUTO: 9.1 10E3/UL (ref 4–11)

## 2022-02-16 PROCEDURE — 85025 COMPLETE CBC W/AUTO DIFF WBC: CPT | Performed by: PHYSICIAN ASSISTANT

## 2022-02-16 PROCEDURE — 84484 ASSAY OF TROPONIN QUANT: CPT | Performed by: EMERGENCY MEDICINE

## 2022-02-16 PROCEDURE — 93000 ELECTROCARDIOGRAM COMPLETE: CPT | Performed by: PHYSICIAN ASSISTANT

## 2022-02-16 PROCEDURE — 83880 ASSAY OF NATRIURETIC PEPTIDE: CPT | Performed by: EMERGENCY MEDICINE

## 2022-02-16 PROCEDURE — 84443 ASSAY THYROID STIM HORMONE: CPT | Performed by: EMERGENCY MEDICINE

## 2022-02-16 PROCEDURE — 93005 ELECTROCARDIOGRAM TRACING: CPT | Mod: 76

## 2022-02-16 PROCEDURE — 85048 AUTOMATED LEUKOCYTE COUNT: CPT | Performed by: EMERGENCY MEDICINE

## 2022-02-16 PROCEDURE — 250N000009 HC RX 250: Performed by: EMERGENCY MEDICINE

## 2022-02-16 PROCEDURE — 80048 BASIC METABOLIC PNL TOTAL CA: CPT | Performed by: EMERGENCY MEDICINE

## 2022-02-16 PROCEDURE — 80048 BASIC METABOLIC PNL TOTAL CA: CPT | Performed by: PHYSICIAN ASSISTANT

## 2022-02-16 PROCEDURE — 99215 OFFICE O/P EST HI 40 MIN: CPT | Performed by: PHYSICIAN ASSISTANT

## 2022-02-16 PROCEDURE — 36415 COLL VENOUS BLD VENIPUNCTURE: CPT | Performed by: EMERGENCY MEDICINE

## 2022-02-16 PROCEDURE — 85379 FIBRIN DEGRADATION QUANT: CPT | Performed by: EMERGENCY MEDICINE

## 2022-02-16 PROCEDURE — 99285 EMERGENCY DEPT VISIT HI MDM: CPT | Mod: 25

## 2022-02-16 PROCEDURE — 258N000003 HC RX IP 258 OP 636: Performed by: EMERGENCY MEDICINE

## 2022-02-16 PROCEDURE — 250N000013 HC RX MED GY IP 250 OP 250 PS 637: Performed by: EMERGENCY MEDICINE

## 2022-02-16 PROCEDURE — 36415 COLL VENOUS BLD VENIPUNCTURE: CPT | Performed by: PHYSICIAN ASSISTANT

## 2022-02-16 PROCEDURE — 96374 THER/PROPH/DIAG INJ IV PUSH: CPT

## 2022-02-16 PROCEDURE — 93005 ELECTROCARDIOGRAM TRACING: CPT

## 2022-02-16 PROCEDURE — 96361 HYDRATE IV INFUSION ADD-ON: CPT

## 2022-02-16 RX ORDER — METOPROLOL TARTRATE 1 MG/ML
5 INJECTION, SOLUTION INTRAVENOUS EVERY 5 MIN PRN
Status: DISCONTINUED | OUTPATIENT
Start: 2022-02-16 | End: 2022-02-17 | Stop reason: HOSPADM

## 2022-02-16 RX ORDER — SULFAMETHOXAZOLE/TRIMETHOPRIM 800-160 MG
1 TABLET ORAL 2 TIMES DAILY
Qty: 14 TABLET | Refills: 0 | Status: SHIPPED | OUTPATIENT
Start: 2022-02-16 | End: 2022-02-23

## 2022-02-16 RX ORDER — METOPROLOL TARTRATE 25 MG/1
50 TABLET, FILM COATED ORAL ONCE
Status: COMPLETED | OUTPATIENT
Start: 2022-02-16 | End: 2022-02-16

## 2022-02-16 RX ORDER — METOPROLOL TARTRATE 1 MG/ML
5 INJECTION, SOLUTION INTRAVENOUS ONCE
Status: COMPLETED | OUTPATIENT
Start: 2022-02-16 | End: 2022-02-16

## 2022-02-16 RX ADMIN — METOPROLOL TARTRATE 50 MG: 25 TABLET, FILM COATED ORAL at 18:44

## 2022-02-16 RX ADMIN — SODIUM CHLORIDE 500 ML: 9 INJECTION, SOLUTION INTRAVENOUS at 18:19

## 2022-02-16 RX ADMIN — METOPROLOL TARTRATE 5 MG: 5 INJECTION INTRAVENOUS at 18:04

## 2022-02-16 RX ADMIN — METOPROLOL TARTRATE 5 MG: 5 INJECTION INTRAVENOUS at 18:16

## 2022-02-16 NOTE — PLAN OF CARE
How Severe Is Your Psoriasis?: moderate Problem: Patient Care Overview  Goal: Plan of Care/Patient Progress Review  Outcome: Improving  diagnostic tests and consults completed and resulted - not met  -vital signs normal or at patient baseline - not met, elevated BP  Nurse to notify provider when observation goals have been met and patient is ready for discharge.    A&O. VSS,RA, ex elevated BP. C/O abdominal pain PRN Oxycodone administered. Decreased in pain. Voided, urine strained, no sedimented noted. Plan for Urology consult . IV SL. Assist x1 with transfer. Lactic acid elevated in ED. Recheck order in am. Continue to monitor.   Do You Have A Family History Of Psoriasis?: no Is This A New Presentation, Or A Follow-Up?: Follow Up Psoriasis

## 2022-02-16 NOTE — PROGRESS NOTES
SUBJECTIVE:  Moira Andre is a 88 year old female who presents to the clinic today for a wound recheck.  Onset of rash was 2 week(s) ago.   Rash is improving.  Location of the rash: lower leg.  Quality/symptoms of rash: thick scab with mild erythema. Feels much better   Symptoms are moderate and rash seems to be improving.  Previous history of a similar rash? No  Recent exposure history: scrapped on oven door    Associated symptoms include: nothing.    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     Myocardial infarction (H) 9/2007, cath 2013 ml    BMS: stent to OM, diag, nl EF, echo /C angia 2013 , f/u cath no lesion >40%     Nephrolithiasis     right side     OA (osteoarthritis) of knee      Obesity      Rheumatoid arthritis flare (H)     prednisone     Sleep apnea     restarted using cpap 2017     TIA (transient ischaemic attack)      Ventral hernia, unspecified, without mention of obstruction or gangrene      Current Outpatient Medications   Medication Sig Dispense Refill     acetaminophen (TYLENOL) 500 MG tablet Take 2 tablets (1,000 mg) by mouth 3 times daily       albuterol (PROAIR HFA/PROVENTIL HFA/VENTOLIN HFA) 108 (90 Base) MCG/ACT inhaler INHALE 2 PUFFS INTO THE LUNGS EVERY 6 HOURS FOR SHORTNESS OF BREATH 54 g 1     amLODIPine (NORVASC) 5 MG tablet Take 1 tablet (5 mg) by mouth daily for Blood Pressure 90 tablet 3     atorvastatin (LIPITOR) 20 MG tablet Take 1 tablet (20 mg) by mouth  daily for cholestrol 90 tablet 3     blood glucose (TRUE METRIX BLOOD GLUCOSE TEST) test strip 100 strips by In Vitro route daily Use to test blood sugar once times daily or as directed. 100 strip 1     Blood Glucose Calibration (TRUE METRIX LEVEL 1) Low SOLN 1 each daily as needed (to calibrate blood glucose machine) 1 each 3     blood glucose monitoring (NO BRAND SPECIFIED) meter device kit Use to test blood sugar 1-2 times daily or as directed. 1 kit 0     Blood Glucose Monitoring Suppl (TRUE METRIX AIR GLUCOSE METER) w/Device KIT 1 Units See Admin Instructions 1 kit 0     buPROPion (WELLBUTRIN SR) 100 MG 12 hr tablet Take 1 tablet (100 mg) by mouth daily for mood 90 tablet 3     Cholecalciferol (VITAMIN D) 2000 UNITS CAPS Take 2,000 Units by mouth daily        clopidogrel (PLAVIX) 75 MG tablet Take 1 tablet (75 mg) by mouth daily 90 tablet 2     DULoxetine (CYMBALTA) 60 MG capsule Take 1 capsule (60 mg) by mouth daily 90 capsule 3     fluticasone (FLONASE) 50 MCG/ACT nasal spray Spray 1 spray into both nostrils daily as needed        fluticasone-salmeterol (ADVAIR) 100-50 MCG/DOSE inhaler INHALE 1 PUFF EVERY 12 HOURS 60 each 3     glimepiride (AMARYL) 2 MG tablet Take 1 tablet (2 mg) by mouth every morning (before breakfast) for diabetese 90 tablet 3     IBANdronate (BONIVA) 150 MG tablet TAKE 1 TABLET EVERY 30 DAYS FOR OSTEOPENIA 3 tablet 1     Lidocaine (LIDOCARE) 4 % Patch Place 1 patch onto the skin every 24 hours To prevent lidocaine toxicity, patient should be patch free for 12 hrs daily.       losartan (COZAAR) 50 MG tablet TAKE 1 TABLET EVERY DAY FOR BLOOD PRESSURE 90 tablet 1     Menthol, Topical Analgesic, (ICY HOT EX) Apply to affected area every morning       metoprolol succinate ER (TOPROL-XL) 100 MG 24 hr tablet Take 1 tablet (100 mg) by mouth daily 90 tablet 2     nitroGLYcerin (NITROSTAT) 0.4 MG sublingual tablet For chest pain place 1 tablet under the tongue every 5 minutes for 3 doses. If  symptoms persist 5 minutes after 1st dose call 911. 25 tablet 0     oxyCODONE (ROXICODONE) 5 MG tablet Take 1 tablet (5 mg) by mouth 2 times daily as needed for pain 20 tablet 0     pantoprazole (PROTONIX) 20 MG EC tablet Take 1 tablet (20 mg) by mouth daily for acid reflux 90 tablet 1     polyethylene glycol (MIRALAX) 17 g packet Take 17 g by mouth daily       sulfamethoxazole-trimethoprim (BACTRIM DS) 800-160 MG tablet Take 1 tablet by mouth 2 times daily for 7 days 14 tablet 0     triamcinolone (KENALOG) 0.1 % external cream Apply topically 2 times daily 85 g 1     TRUEplus Lancets 33G MISC 1 each 2 times daily 100 each 0     order for DME Equipment being ordered: diabetic shoes 1 each 0     order for DME DREAMSTATION  5-15 CM/H20  NASAL WISP FABRIC (Patient not taking: Reported on 2022)       Social History     Tobacco Use     Smoking status: Former Smoker     Packs/day: 0.25     Types: Cigarettes     Start date:      Quit date: 1973     Years since quittin.8     Smokeless tobacco: Never Used   Substance Use Topics     Alcohol use: Yes     Alcohol/week: 0.0 - 1.0 standard drinks     Comment: rarely       ROS:  Review of systems negative except as stated above.    EXAM:   BP (!) 181/108   Pulse (!) 138   Temp 97.5  F (36.4  C) (Tympanic)   Resp 22   SpO2 97%   GENERAL: alert, no acute distress.  SKIN: thick eschar on right shin, minimal erythema  Patient is diaphoretic  GENERAL APPEARANCE: healthy, alert and no distress  EYES: EOMI,  PERRL, conjunctiva clear  NECK: supple, non-tender to palpation, no adenopathy noted  RESP: heavy/labored breathing yet lungs clear to auscultation - no rales, rhonchi or wheezes  CV: Regular, rapid rate  NEURO: Normal strength and tone, sensory exam grossly normal,  normal speech and mentation      Results for orders placed or performed during the hospital encounter of 22   Basic metabolic panel     Status: Abnormal   Result Value Ref Range    Sodium 132  (L) 133 - 144 mmol/L    Potassium 5.6 (H) 3.4 - 5.3 mmol/L    Chloride 104 94 - 109 mmol/L    Carbon Dioxide (CO2) 21 20 - 32 mmol/L    Anion Gap 7 3 - 14 mmol/L    Urea Nitrogen 28 7 - 30 mg/dL    Creatinine 1.23 (H) 0.52 - 1.04 mg/dL    Calcium 9.3 8.5 - 10.1 mg/dL    Glucose 156 (H) 70 - 99 mg/dL    GFR Estimate 42 (L) >60 mL/min/1.73m2   Troponin I     Status: Normal   Result Value Ref Range    Troponin I High Sensitivity 20 <54 ng/L   CBC with platelets and differential     Status: Abnormal   Result Value Ref Range    WBC Count 10.7 4.0 - 11.0 10e3/uL    RBC Count 5.42 (H) 3.80 - 5.20 10e6/uL    Hemoglobin 14.3 11.7 - 15.7 g/dL    Hematocrit 46.4 35.0 - 47.0 %    MCV 86 78 - 100 fL    MCH 26.4 (L) 26.5 - 33.0 pg    MCHC 30.8 (L) 31.5 - 36.5 g/dL    RDW 15.6 (H) 10.0 - 15.0 %    Platelet Count 320 150 - 450 10e3/uL    % Neutrophils 67 %    % Lymphocytes 23 %    % Monocytes 7 %    % Eosinophils 1 %    % Basophils 1 %    % Immature Granulocytes 1 %    NRBCs per 100 WBC 0 <1 /100    Absolute Neutrophils 7.2 1.6 - 8.3 10e3/uL    Absolute Lymphocytes 2.4 0.8 - 5.3 10e3/uL    Absolute Monocytes 0.8 0.0 - 1.3 10e3/uL    Absolute Eosinophils 0.2 0.0 - 0.7 10e3/uL    Absolute Basophils 0.1 0.0 - 0.2 10e3/uL    Absolute Immature Granulocytes 0.1 <=0.4 10e3/uL    Absolute NRBCs 0.0 10e3/uL   Extra Blue Top Tube     Status: None   Result Value Ref Range    Hold Specimen JI    D dimer quantitative     Status: Abnormal   Result Value Ref Range    D-Dimer Quantitative 0.61 (H) 0.00 - 0.50 ug/mL FEU    Narrative    This D-dimer assay is intended for use in conjunction with a clinical pretest probability assessment model to exclude pulmonary embolism (PE) and deep venous thrombosis (DVT) in outpatients suspected of PE or DVT. The cut-off value is 0.50 ug/mL FEU.   TSH with free T4 reflex     Status: Normal   Result Value Ref Range    TSH 2.51 0.40 - 4.00 mU/L   BNP     Status: Normal   Result Value Ref Range    N terminal Pro  BNP Inpatient 861 0-1,800 pg/mL   Troponin I (now)     Status: Normal   Result Value Ref Range    Troponin I High Sensitivity 19 <54 ng/L   EKG 12-lead, tracing only     Status: None   Result Value Ref Range    Systolic Blood Pressure  mmHg    Diastolic Blood Pressure  mmHg    Ventricular Rate 93 BPM    Atrial Rate 105 BPM    OH Interval  ms    QRS Duration 88 ms     ms    QTc 504 ms    P Axis  degrees    R AXIS -20 degrees    T Axis 82 degrees    Interpretation ECG       Accelerated Junctional rhythm  Septal infarct (cited on or before 21-JUL-2007)  Abnormal ECG  When compared with ECG of 16-MAR-2019 07:24,  Junctional rhythm has replaced Sinus rhythm  Questionable change in initial forces of Septal leads  Confirmed by GENERATED REPORT, COMPUTER ([id number]),  FERNY TANNER ([id number]) on 2/16/2022 6:43:28 PM     EKG 12-lead, tracing only     Status: None   Result Value Ref Range    Systolic Blood Pressure  mmHg    Diastolic Blood Pressure  mmHg    Ventricular Rate 145 BPM    Atrial Rate 133 BPM    OH Interval  ms    QRS Duration 102 ms     ms    QTc 459 ms    P Axis  degrees    R AXIS -29 degrees    T Axis 114 degrees    Interpretation ECG       Supraventricular tachycardia  Left ventricular hypertrophy with repolarization abnormality  Cannot rule out Septal infarct (cited on or before 21-JUL-2007)  Abnormal ECG  When compared with ECG of 16-MAR-2019 07:24,  OH interval has decreased  Vent. rate has increased BY  68 BPM  Questionable change in initial forces of Anteroseptal leads  ST now depressed in Lateral leads  T wave inversion more evident in Lateral leads  Confirmed by GENERATED REPORT, COMPUTER ([id number]),  LAURIE FORTUNE ([id number]) on 2/16/2022 7:00:45 PM     CBC with Platelets & Differential     Status: Abnormal    Narrative    The following orders were created for panel order CBC with Platelets & Differential.  Procedure                               Abnormality         Status                      ---------                               -----------         ------                     CBC with platelets and d...[[id number]]  Abnormal            Final result                 Please view results for these tests on the individual orders.   [place] Draw     Status: None    Narrative    The following orders were created for panel order [place] Draw.  Procedure                               Abnormality         Status                     ---------                               -----------         ------                     Extra Blue Top Tube[[id number]]                              Final result                 Please view results for these tests on the individual orders.   Results for orders placed or performed in visit on 02/16/22   Basic metabolic panel  (Ca, Cl, CO2, Creat, Gluc, K, Na, BUN)     Status: Abnormal   Result Value Ref Range    Sodium 136 133 - 144 mmol/L    Potassium 4.6 3.4 - 5.3 mmol/L    Chloride 106 94 - 109 mmol/L    Carbon Dioxide (CO2) 22 20 - 32 mmol/L    Anion Gap 8 3 - 14 mmol/L    Urea Nitrogen 29 7 - 30 mg/dL    Creatinine 1.28 (H) 0.52 - 1.04 mg/dL    Calcium 9.3 8.5 - 10.1 mg/dL    Glucose 177 (H) 70 - 99 mg/dL    GFR Estimate 40 (L) >60 mL/min/1.73m2   WBC count     Status: Normal   Result Value Ref Range    WBC Count 9.1 4.0 - 11.0 10e3/uL       ASSESSMENT:  (L53.9) Erythematous condition  (primary encounter diagnosis)  Plan: sulfamethoxazole-trimethoprim (BACTRIM DS)         800-160 MG tablet, WBC count, EKG 12-lead         complete w/read - Clinics      (N18.30) Stage 3 chronic kidney disease, unspecified whether stage 3a or 3b CKD (H)  Plan: Basic metabolic panel  (Ca, Cl, CO2, Creat,         Gluc, K, Na, BUN)        (R00.0) Tachycardia  Comment: history MI, ekg changes, diaphoretic  Plan: sent to ER, patient refuses EMS

## 2022-02-16 NOTE — ED TRIAGE NOTES
Pt reports going to the clinic for wound check, clinic stated pt had high blood pressure and abnormal EKG, sent to ED

## 2022-02-17 ASSESSMENT — ENCOUNTER SYMPTOMS
FEVER: 0
CHEST TIGHTNESS: 0
CHILLS: 0
SHORTNESS OF BREATH: 1
HEADACHES: 0
WHEEZING: 0
PALPITATIONS: 0
JOINT SWELLING: 0
LIGHT-HEADEDNESS: 0
WOUND: 1

## 2022-02-17 NOTE — DISCHARGE INSTRUCTIONS
I think your heart rhythm today was elevated because you had not taken your regular medications and he seemed slightly dehydrated.  I recommend drinking plenty of water, and taking your evening medications tonight as prescribed.  Follow-up with your primary care physician later this week, or early next week.  If you cannot, check your heart rate at home.  Return to the ED right away if you have heart rates over 100 persistently, chest pain, passing out, increase shortness of breath, or other concerns.

## 2022-02-17 NOTE — ED PROVIDER NOTES
History   Chief Complaint:  Abnormal Ekg       HPI   Moira Andre is a 88 year old female with history of CHF, TIA, aortic sclerosis, diabetes, acute MI who presents with palpitations.  Patient states that she was at a scheduled wound care clinic visit today for a nonhealing right lower extremity wound.  When they checked vitals, they found that her heart rate was in the 140s.  At that point, she was transferred to the ED.  Heart rates upon arrival were in the 140s, but the patient denies any symptoms.  She is mostly irritated at being sent to the ED because she feels that she is asymptomatic.  Specifically, she denies lightheadedness, syncope, chest discomfort, or the sensation of palpitations.  She has dyspnea on exertion at baseline, which is unchanged.  She feels that her symptoms are worsened by the mask.  She did have a sharp, left-sided pain under her left breast which resolved about a day ago.  She denies any leg swelling or calf pain.  She has not had any fevers or rash involving the wound on her leg, and overall, it seems to be improving.  She has never been diagnosed with atrial fibrillation to her knowledge.  She admits that she has not taken her medication for the last 2 days, she simply forgot.  She does have the medication at home.  She did not eat much today because she was in and out of the clinic, and has not had much to drink today either.  She follows with Dr. Ngo of cardiology.      Review of Systems   Constitutional: Negative for chills and fever.   Respiratory: Positive for shortness of breath. Negative for chest tightness and wheezing.    Cardiovascular: Negative for chest pain and palpitations.   Musculoskeletal: Negative for gait problem and joint swelling.   Skin: Positive for wound. Negative for rash.   Neurological: Negative for syncope, light-headedness and headaches.   All other systems reviewed and are  "negative.    Allergies:  Aspirin  Lisinopril  Metformin  Minocycline  Salicylates  Yellow Dye  Yellow Dyes    Medications:  Amlodipine   Bupropion   Plavix   Duloxetine   Amaryl   Ibandronate   Losartan   Metoprolol   Nitroglycerin    Oxycodone   Pantoprazole      Past Medical History:     Arrhythmia   Asthma   CKD   CHF   Depression   Diabetes   Hypertension   Lactic acidosis   Migraines   Mitral stenosis   MI   Obesity   Nephrolithiasis   Arthritis   Sleep apnea   TIA   Hernia   Lumbar radiculopathy     Past Surgical History:    Appendectomy   Breast biopsy   Cholecystectomy   Coronary angiography   Left heart catheterization   Hysterectomy   Knee replacement   Carpal tunnel release     Family History:    MS   CAD     Social History:  Patient presents alone.  She shared that she worked for 29 years as a [employer] in the [employer].    Physical Exam     Patient Vitals for the past 24 hrs:   BP Temp Temp src Pulse Resp SpO2 Height Weight   02/16/22 1900 (!) 151/104 -- -- 89 18 96 % -- --   02/16/22 1820 (!) 141/99 -- -- 109 23 92 % -- --   02/16/22 1810 (!) 153/84 -- -- (!) 144 18 97 % -- --   02/16/22 1800 (!) 152/99 -- -- (!) 149 26 98 % -- --   02/16/22 1746 (!) 179/106 97  F (36.1  C) Oral (!) 148 20 98 % 1.702 m (5' 7\") 122.5 kg (270 lb)       Physical Exam  General: alert, lying comfortably on gurney, pleasant and conversant  HENT: mucous membranes moist  CV: tachycardic rate, regular rhythm  Resp: normal effort, clear throughout, no crackles or wheezing  GI: abdomen soft and nontender, no guarding  MSK: no bony tenderness  Skin: appropriately warm and dry  Extremities: non-pitting edema, calves non-tender.  Scabbed wound to the right lower extremity, no surrounding erythema, no warmth.  Neuro: alert, clear speech, oriented  Psych: normal mood and affect      Emergency Department Course   ECG  ECG taken at 1749, ECG read at 1918  Supraventricular tachycardia   Left ventricular hypertrophy with repolarization " abnormality   Cannot rule out septal infarct, age undetermined  Changes as compared to prior, dated 11/11/20.  Rate 145 bpm. NC interval * ms. QRS duration 102 ms. QT/QTc 296/459 ms. P-R-T axes * -29 114.     ECG #2  ECG obtained at 1841, ECG read at 1857  Septal infarct, age undetermined    Suspect 1st degree AVB   No significant changes as compared to prior, dated 11/11/20.  Rate 93 bpm. NC interval * ms. QRS duration 88 ms. QT/QTc 406/504 ms. P-R-T axes * -20 82.     Laboratory:  Labs Ordered and Resulted from Time of ED Arrival to Time of ED Departure   BASIC METABOLIC PANEL - Abnormal       Result Value    Sodium 132 (*)     Potassium 5.6 (*)     Chloride 104      Carbon Dioxide (CO2) 21      Anion Gap 7      Urea Nitrogen 28      Creatinine 1.23 (*)     Calcium 9.3      Glucose 156 (*)     GFR Estimate 42 (*)    CBC WITH PLATELETS AND DIFFERENTIAL - Abnormal    WBC Count 10.7      RBC Count 5.42 (*)     Hemoglobin 14.3      Hematocrit 46.4      MCV 86      MCH 26.4 (*)     MCHC 30.8 (*)     RDW 15.6 (*)     Platelet Count 320      % Neutrophils 67      % Lymphocytes 23      % Monocytes 7      % Eosinophils 1      % Basophils 1      % Immature Granulocytes 1      NRBCs per 100 WBC 0      Absolute Neutrophils 7.2      Absolute Lymphocytes 2.4      Absolute Monocytes 0.8      Absolute Eosinophils 0.2      Absolute Basophils 0.1      Absolute Immature Granulocytes 0.1      Absolute NRBCs 0.0     D DIMER QUANTITATIVE - Abnormal    D-Dimer Quantitative 0.61 (*)    TROPONIN I - Normal    Troponin I High Sensitivity 20     TSH WITH FREE T4 REFLEX - Normal    TSH 2.51     NT PROBNP INPATIENT - Normal    N terminal Pro BNP Inpatient 861          Emergency Department Course:  Reviewed:  I reviewed nursing notes, vitals, past medical history and Care Everywhere    Assessments:  I obtained history and examined the patient as noted above.   I rechecked the patient and explained findings.  Patient continues to be  asymptomatic.  She is eating and drinking, in good spirits.  She is able to get around per baseline, with a walker.    Interventions:  Medications   metoprolol (LOPRESSOR) injection 5 mg (5 mg Intravenous Given 2/16/22 1816)   metoprolol (LOPRESSOR) injection 5 mg (5 mg Intravenous Given 2/16/22 1804)   0.9% sodium chloride BOLUS (500 mLs Intravenous New Bag 2/16/22 1819)   metoprolol tartrate (LOPRESSOR) tablet 50 mg (50 mg Oral Given 2/16/22 1844)       Disposition:  The patient was discharged to home.     Impression & Plan     CMS Diagnoses: None    Medical Decision Making:  Moira Andre is a 88 year old female with history of CHF, TIA, aortic sclerosis, diabetes, acute MI who presents with palpitations.  On arrival, the patient was tachycardic with heart rates in the 140s, but asymptomatic.  She has a nonhealing wound on her right lower extremity, but no evidence for cellulitis.  She has not had fever.  Otherwise, work-up in the ED is notable for mild hyponatremia, and mild hyperkalemia.  She has renal insufficiency, though this seems to be at baseline.  Troponin is flat.  She has no anemia, and D-dimer is appropriate for age-adjusted criteria.  Patient noted that she had not had much to eat or drink today while in her clinic visit.  She also missed her medications for the last 2 days.  Recheck of her heart rate after IV fluids and metoprolol demonstrates that she is back into a sinus bradycardia with first-degree block, which is her baseline.  EKG when she was tachycardic was initially concerning to me for possible atrial flutter, though now that she is back into a slow rhythm, I think it is more consistent with a sinus tachycardia with her baseline first-degree block.  At this point, I do not think she needs to be admitted to the hospital.  I think her tachycardia is likely related to combination of volume depletion and not taking her regular medications.  She will return to the ED for recurrent symptoms,  syncope, chest pain, increase shortness of breath, persistently elevated heart rate, or other concerns.  Have recommended close follow-up with PCP.  She will try to check her heart rate this weekend, given she was asymptomatic with heart rate in the 140s.      Diagnosis:    ICD-10-CM    1. Sinus tachycardia  R00.0      Scribe Disclosure:  BALA Brendamihir Vang, am serving as a scribe at 6:10 PM on 2/16/2022 to document services personally performed by Karmen Arora MD based on my observations and the provider's statements to me.          Karmen Arora MD  02/17/22 2017

## 2022-02-18 ENCOUNTER — PATIENT OUTREACH (OUTPATIENT)
Dept: FAMILY MEDICINE | Facility: CLINIC | Age: 87
End: 2022-02-18
Payer: COMMERCIAL

## 2022-02-18 NOTE — TELEPHONE ENCOUNTER
What type of discharge? Emergency Department  Risk of Hospital admission or ED visit: 77%  Is a TCM episode required? No  When should the patient follow up with PCP? within 30 days of discharge.    Meaghan Gongora RN  [facility]

## 2022-02-24 ENCOUNTER — OFFICE VISIT (OUTPATIENT)
Dept: FAMILY MEDICINE | Facility: CLINIC | Age: 87
End: 2022-02-24
Payer: COMMERCIAL

## 2022-02-24 VITALS
BODY MASS INDEX: 43.16 KG/M2 | TEMPERATURE: 97.7 F | DIASTOLIC BLOOD PRESSURE: 76 MMHG | WEIGHT: 275 LBS | HEART RATE: 74 BPM | RESPIRATION RATE: 16 BRPM | SYSTOLIC BLOOD PRESSURE: 152 MMHG | HEIGHT: 67 IN | OXYGEN SATURATION: 94 %

## 2022-02-24 DIAGNOSIS — G47.33 OSA ON CPAP: ICD-10-CM

## 2022-02-24 DIAGNOSIS — J45.20 MILD INTERMITTENT ASTHMA WITHOUT COMPLICATION: ICD-10-CM

## 2022-02-24 DIAGNOSIS — E11.21 TYPE 2 DIABETES MELLITUS WITH DIABETIC NEPHROPATHY, WITHOUT LONG-TERM CURRENT USE OF INSULIN (H): Primary | ICD-10-CM

## 2022-02-24 DIAGNOSIS — E66.01 MORBID OBESITY (H): ICD-10-CM

## 2022-02-24 DIAGNOSIS — F32.1 MODERATE MAJOR DEPRESSION (H): ICD-10-CM

## 2022-02-24 DIAGNOSIS — L03.115 CELLULITIS OF RIGHT LOWER EXTREMITY: ICD-10-CM

## 2022-02-24 DIAGNOSIS — E78.5 HYPERLIPIDEMIA LDL GOAL <70: ICD-10-CM

## 2022-02-24 PROCEDURE — 99214 OFFICE O/P EST MOD 30 MIN: CPT | Performed by: INTERNAL MEDICINE

## 2022-02-24 RX ORDER — CEPHALEXIN 500 MG/1
500 CAPSULE ORAL 3 TIMES DAILY
Qty: 21 CAPSULE | Refills: 1 | Status: SHIPPED | OUTPATIENT
Start: 2022-02-24 | End: 2022-03-03

## 2022-02-24 ASSESSMENT — PATIENT HEALTH QUESTIONNAIRE - PHQ9: SUM OF ALL RESPONSES TO PHQ QUESTIONS 1-9: 3

## 2022-02-24 ASSESSMENT — PAIN SCALES - GENERAL: PAINLEVEL: NO PAIN (0)

## 2022-02-24 ASSESSMENT — ASTHMA QUESTIONNAIRES: ACT_TOTALSCORE: 20

## 2022-02-24 NOTE — PROGRESS NOTES
Guanako Simons is a 88 year old who presents for the following health issues     HPI       Chief Complaint:     Right lower leg cellulitis    HPI:   Patient Moira Andre is a very pleasant 88 year old female with history of Type 2 Diabetes, depression who presents to Internal Medicine clinic today for follow up on multiple concerns including recent right lower leg cellulitis. No chest pain, headaches, fever or chills at this time.       Current Medications:     Current Outpatient Medications   Medication Sig Dispense Refill     acetaminophen (TYLENOL) 500 MG tablet Take 2 tablets (1,000 mg) by mouth 3 times daily       albuterol (PROAIR HFA/PROVENTIL HFA/VENTOLIN HFA) 108 (90 Base) MCG/ACT inhaler INHALE 2 PUFFS INTO THE LUNGS EVERY 6 HOURS FOR SHORTNESS OF BREATH 54 g 1     amLODIPine (NORVASC) 5 MG tablet Take 1 tablet (5 mg) by mouth daily for Blood Pressure 90 tablet 3     atorvastatin (LIPITOR) 20 MG tablet Take 1 tablet (20 mg) by mouth daily for cholestrol 90 tablet 3     blood glucose (TRUE METRIX BLOOD GLUCOSE TEST) test strip 100 strips by In Vitro route daily Use to test blood sugar once times daily or as directed. 100 strip 1     Blood Glucose Calibration (TRUE METRIX LEVEL 1) Low SOLN 1 each daily as needed (to calibrate blood glucose machine) 1 each 3     blood glucose monitoring (NO BRAND SPECIFIED) meter device kit Use to test blood sugar 1-2 times daily or as directed. 1 kit 0     Blood Glucose Monitoring Suppl (TRUE METRIX AIR GLUCOSE METER) w/Device KIT 1 Units See Admin Instructions 1 kit 0     buPROPion (WELLBUTRIN SR) 100 MG 12 hr tablet Take 1 tablet (100 mg) by mouth daily for mood 90 tablet 3     cephALEXin (KEFLEX) 500 MG capsule Take 1 capsule (500 mg) by mouth 3 times daily for 7 days 21 capsule 1     Cholecalciferol (VITAMIN D) 2000 UNITS CAPS Take 2,000 Units by mouth daily        clopidogrel (PLAVIX) 75 MG tablet Take 1 tablet (75 mg) by mouth daily 90 tablet 2     DULoxetine  (CYMBALTA) 60 MG capsule Take 1 capsule (60 mg) by mouth daily 90 capsule 3     fluticasone (FLONASE) 50 MCG/ACT nasal spray Spray 1 spray into both nostrils daily as needed        fluticasone-salmeterol (ADVAIR) 100-50 MCG/DOSE inhaler INHALE 1 PUFF EVERY 12 HOURS 60 each 3     glimepiride (AMARYL) 2 MG tablet Take 1 tablet (2 mg) by mouth every morning (before breakfast) for diabetese 90 tablet 3     IBANdronate (BONIVA) 150 MG tablet TAKE 1 TABLET EVERY 30 DAYS FOR OSTEOPENIA 3 tablet 1     Lidocaine (LIDOCARE) 4 % Patch Place 1 patch onto the skin every 24 hours To prevent lidocaine toxicity, patient should be patch free for 12 hrs daily.       losartan (COZAAR) 50 MG tablet TAKE 1 TABLET EVERY DAY FOR BLOOD PRESSURE 90 tablet 1     Menthol, Topical Analgesic, (ICY HOT EX) Apply to affected area every morning       metoprolol succinate ER (TOPROL-XL) 100 MG 24 hr tablet Take 1 tablet (100 mg) by mouth daily 90 tablet 2     nitroGLYcerin (NITROSTAT) 0.4 MG sublingual tablet For chest pain place 1 tablet under the tongue every 5 minutes for 3 doses. If symptoms persist 5 minutes after 1st dose call 911. 25 tablet 0     order for DME Equipment being ordered: diabetic shoes 1 each 0     order for DME DREAMSTATION  5-15 CM/H20  NASAL WISP [employer]        oxyCODONE (ROXICODONE) 5 MG tablet Take 1 tablet (5 mg) by mouth 2 times daily as needed for pain 20 tablet 0     pantoprazole (PROTONIX) 20 MG EC tablet Take 1 tablet (20 mg) by mouth daily for acid reflux 90 tablet 1     polyethylene glycol (MIRALAX) 17 g packet Take 17 g by mouth daily       triamcinolone (KENALOG) 0.1 % external cream Apply topically 2 times daily 85 g 1     TRUEplus Lancets 33G MISC 1 each 2 times daily 100 each 0         Allergies:      Allergies   Allergen Reactions     Aspirin Hives     Reaction occurred during childhood.      Lisinopril Cough     Metformin      Elevated lactic acid     Minocycline      Yellow Dye Allergy. Minocycline has  Yellow Dye #10.     Salicylates Hives     Yellow Dye Hives     Rxn to yellow tablet. Eyes swelled shut.      Yellow Dyes Hives            Past Medical History:     Past Medical History:   Diagnosis Date     Aortic valve sclerosis     heart murmur, no AS     Arrhythmia     PAT, PVC     Aspirin allergy     Plavix use long term     Asthma      CKD (chronic kidney disease) stage 3, GFR 30-59 ml/min (H)     x 2007 atleast     Congestive heart failure, unspecified      Depression      Diabetes mellitus (H)      Diastolic dysfunction, left ventricle     grade 2, nl ef     HTN (hypertension)      Lactic acidosis 2018    due to dehydration and metformin     Migraine headache      Mitral stenosis     mild, 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     Myocardial infarction (H) 2007, cath  ml    BMS: stent to OM, diag, nl EF, echo /C angia  , f/u cath no lesion >40%     Nephrolithiasis     right side     OA (osteoarthritis) of knee      Obesity      Rheumatoid arthritis flare (H)     prednisone     Sleep apnea     restarted using cpap      TIA (transient ischaemic attack)      Ventral hernia, unspecified, without mention of obstruction or gangrene          Past Surgical History:     Past Surgical History:   Procedure Laterality Date     APPENDECTOMY       BIOPSY BREAST      x2 -needle & lumpectomy-benign     CHOLECYSTECTOMY       CORONARY ANGIOGRAPHY ADULT ORDER  2007    Bare metal stent to OM1, Diagonal patent      CORONARY ANGIOGRAPHY ADULT ORDER  2007    Avoca stent to Diagonal     HC LEFT HEART CATHETERIZATION  2013    Moderate CAD     HYSTERECTOMY TOTAL ABDOMINAL       ORTHOPEDIC SURGERY      knee replacement on right side (), Left side ()     RELEASE CARPAL TUNNEL      right and left     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         Family Medical History:     Family History   Problem Relation Age of Onset     Neurologic Disorder Mother         MS - at 60's     C.A.D. Father           at 8o's, ? prostate ca     Breast Cancer No family hx of      Cancer - colorectal No family hx of          Social History:     Social History     Socioeconomic History     Marital status:      Spouse name: Not on file     Number of children: Not on file     Years of education: Not on file     Highest education level: Not on file   Occupational History     Not on file   Tobacco Use     Smoking status: Former Smoker     Packs/day: 0.25     Types: Cigarettes     Start date:      Quit date: 1973     Years since quittin.8     Smokeless tobacco: Never Used   Substance and Sexual Activity     Alcohol use: Yes     Alcohol/week: 0.0 - 1.0 standard drinks     Comment: rarely     Drug use: No     Sexual activity: Not Currently     Partners: Male   Other Topics Concern     Parent/sibling w/ CABG, MI or angioplasty before 65F 55M? Not Asked      Service Not Asked     Blood Transfusions Not Asked     Caffeine Concern No     Occupational Exposure Not Asked     Hobby Hazards Not Asked     Sleep Concern No     Stress Concern No     Weight Concern No     Special Diet No     Back Care Not Asked     Exercise Yes     Comment: walking, swimming x2 a week     Bike Helmet Not Asked     Seat Belt Yes     Self-Exams Not Asked   Social History Narrative    , 1 step son    Work- clown for profession        Tobacco- none,smoked for couple months in     ETOH- occ 1/2 months    Diet coke- 2-3 cans/day    Exercise- swims 8 laps -3/week             Social Determinants of Health     Financial Resource Strain: Not on file   Food Insecurity: No Food Insecurity     Worried About Running Out of Food in the Last Year: Never true     Ran Out of Food in the Last Year: Never true   Transportation Needs: No Transportation Needs     Lack of Transportation (Medical): No     Lack of Transportation (Non-Medical): No   Physical Activity: Not on file   Stress: No Stress Concern Present     Feeling of Stress : Not  "at all   Social Connections: Unknown     Frequency of Communication with Friends and Family: Not on file     Frequency of Social Gatherings with Friends and Family: Not on file     Attends Confucianist Services: Not on file     Active Member of Clubs or Organizations: Not on file     Attends Club or Organization Meetings: Not on file     Marital Status:    Intimate Partner Violence: Not on file   Housing Stability: Not on file           Review of System:     Constitutional: Negative for fever or chills  Skin: positive for right lower leg cellulits  Ears/Nose/Throat: Negative for nasal congestion, sore throat  Respiratory: No shortness of breath, dyspnea on exertion, cough, or hemoptysis  Cardiovascular: Negative for chest pain  Gastrointestinal: Negative for nausea, vomiting  Genitourinary: Negative for dysuria, hematuria  Musculoskeletal: Negative for myalgias  Neurologic: Negative for headaches  Psychiatric: positive for depression, anxiety  Hematologic/Lymphatic/Immunologic: Negative  Endocrine: Negative for recent hypoglycemia events  Behavioral: Negative for tobacco use       Physical Exam:   BP (!) 152/76 (BP Location: Right arm, Patient Position: Sitting, Cuff Size: Adult Large)   Pulse 74   Temp 97.7  F (36.5  C) (Temporal)   Resp 16   Ht 1.702 m (5' 7\")   Wt 124.7 kg (275 lb)   SpO2 94%   BMI 43.07 kg/m      GENERAL: alert and no distress  EYES: eyes grossly normal to inspection, and conjunctivae and sclerae normal  HENT: Normocephalic atraumatic. Nose and mouth without ulcers or lesions  NECK: supple  RESP: lungs clear to auscultation   CV: regular rate and rhythm, normal S1 S2  LYMPH: no peripheral edema   ABDOMEN: nondistended  MS: no gross musculoskeletal defects noted  SKIN: right lower leg cellulitis symptoms noted  NEURO: Alert & Oriented x 3.   PSYCH: mentation appears normal, affect normal        Diagnostic Test Results:     Diagnostic Test Results:  Labs reviewed in " Epic    ASSESSMENT/PLAN:       Moira was seen today for er f/u.    Diagnoses and all orders for this visit:    Type 2 diabetes mellitus with diabetic nephropathy, without long-term current use of insulin (H)  - stable, continue current therapy    Cellulitis of right lower extremity  -     cephALEXin (KEFLEX) 500 MG capsule; Take 1 capsule (500 mg) by mouth 3 times daily for 7 days    Mild intermittent asthma without complication  - stable, continue current therapy    ELIGIO on CPAP  - stable, continue current therapy    Hyperlipidemia LDL goal <70  - stable, continue current therapy    Morbid obesity (H)  - diet, exercise    Moderate major depression (H)  - stable, continue current therapy          Follow Up Plan:     Patient is instructed to return to Internal Medicine clinic for follow-up visit in 1 week.        Chika Elliott MD  Internal Medicine  [facility]

## 2022-03-03 ENCOUNTER — OFFICE VISIT (OUTPATIENT)
Dept: FAMILY MEDICINE | Facility: CLINIC | Age: 87
End: 2022-03-03
Payer: COMMERCIAL

## 2022-03-03 ENCOUNTER — TELEPHONE (OUTPATIENT)
Dept: WOUND CARE | Facility: CLINIC | Age: 87
End: 2022-03-03

## 2022-03-03 VITALS
WEIGHT: 276 LBS | HEART RATE: 86 BPM | SYSTOLIC BLOOD PRESSURE: 135 MMHG | OXYGEN SATURATION: 97 % | BODY MASS INDEX: 43.32 KG/M2 | HEIGHT: 67 IN | TEMPERATURE: 97.9 F | RESPIRATION RATE: 20 BRPM | DIASTOLIC BLOOD PRESSURE: 80 MMHG

## 2022-03-03 DIAGNOSIS — E11.21 TYPE 2 DIABETES MELLITUS WITH DIABETIC NEPHROPATHY, WITHOUT LONG-TERM CURRENT USE OF INSULIN (H): Primary | ICD-10-CM

## 2022-03-03 DIAGNOSIS — M54.16 LUMBAR RADICULOPATHY: ICD-10-CM

## 2022-03-03 DIAGNOSIS — L03.115 CELLULITIS OF RIGHT LOWER EXTREMITY: Primary | ICD-10-CM

## 2022-03-03 DIAGNOSIS — E66.01 MORBID OBESITY (H): ICD-10-CM

## 2022-03-03 DIAGNOSIS — R07.9 CHEST PAIN, UNSPECIFIED TYPE: ICD-10-CM

## 2022-03-03 DIAGNOSIS — T14.8XXA NON-HEALING NON-SURGICAL WOUND: ICD-10-CM

## 2022-03-03 DIAGNOSIS — I10 ESSENTIAL HYPERTENSION: ICD-10-CM

## 2022-03-03 DIAGNOSIS — Z13.0 SCREENING FOR DEFICIENCY ANEMIA: ICD-10-CM

## 2022-03-03 DIAGNOSIS — N18.32 STAGE 3B CHRONIC KIDNEY DISEASE (H): ICD-10-CM

## 2022-03-03 DIAGNOSIS — Z79.899 MEDICATION MANAGEMENT: ICD-10-CM

## 2022-03-03 DIAGNOSIS — R09.89 OTHER SPECIFIED SYMPTOMS AND SIGNS INVOLVING THE CIRCULATORY AND RESPIRATORY SYSTEMS: ICD-10-CM

## 2022-03-03 DIAGNOSIS — E11.21 TYPE 2 DIABETES MELLITUS WITH DIABETIC NEPHROPATHY, WITHOUT LONG-TERM CURRENT USE OF INSULIN (H): ICD-10-CM

## 2022-03-03 DIAGNOSIS — G47.33 OSA ON CPAP: ICD-10-CM

## 2022-03-03 DIAGNOSIS — E87.5 HYPERKALEMIA: ICD-10-CM

## 2022-03-03 DIAGNOSIS — F32.1 MODERATE MAJOR DEPRESSION (H): ICD-10-CM

## 2022-03-03 DIAGNOSIS — G89.29 OTHER CHRONIC PAIN: ICD-10-CM

## 2022-03-03 DIAGNOSIS — Z79.899 CONTROLLED SUBSTANCE AGREEMENT SIGNED: ICD-10-CM

## 2022-03-03 DIAGNOSIS — E78.5 HYPERLIPIDEMIA LDL GOAL <70: ICD-10-CM

## 2022-03-03 LAB
AMPHETAMINES UR QL: NOT DETECTED
BARBITURATES UR QL SCN: NOT DETECTED
BENZODIAZ UR QL SCN: NOT DETECTED
BUPRENORPHINE UR QL: NOT DETECTED
CANNABINOIDS UR QL: NOT DETECTED
COCAINE UR QL SCN: NOT DETECTED
D-METHAMPHET UR QL: NOT DETECTED
ERYTHROCYTE [DISTWIDTH] IN BLOOD BY AUTOMATED COUNT: 16.4 % (ref 10–15)
HBA1C MFR BLD: 7.2 % (ref 0–5.6)
HCT VFR BLD AUTO: 44.3 % (ref 35–47)
HGB BLD-MCNC: 14.1 G/DL (ref 11.7–15.7)
MCH RBC QN AUTO: 27.5 PG (ref 26.5–33)
MCHC RBC AUTO-ENTMCNC: 31.8 G/DL (ref 31.5–36.5)
MCV RBC AUTO: 87 FL (ref 78–100)
METHADONE UR QL SCN: NOT DETECTED
OPIATES UR QL SCN: NOT DETECTED
OXYCODONE UR QL SCN: NOT DETECTED
PCP UR QL SCN: NOT DETECTED
PLATELET # BLD AUTO: 296 10E3/UL (ref 150–450)
PROPOXYPH UR QL: NOT DETECTED
RBC # BLD AUTO: 5.12 10E6/UL (ref 3.8–5.2)
TRICYCLICS UR QL SCN: NOT DETECTED
WBC # BLD AUTO: 10.9 10E3/UL (ref 4–11)

## 2022-03-03 PROCEDURE — 83036 HEMOGLOBIN GLYCOSYLATED A1C: CPT | Performed by: INTERNAL MEDICINE

## 2022-03-03 PROCEDURE — 80306 DRUG TEST PRSMV INSTRMNT: CPT | Performed by: INTERNAL MEDICINE

## 2022-03-03 PROCEDURE — 36415 COLL VENOUS BLD VENIPUNCTURE: CPT | Performed by: INTERNAL MEDICINE

## 2022-03-03 PROCEDURE — 80069 RENAL FUNCTION PANEL: CPT | Performed by: INTERNAL MEDICINE

## 2022-03-03 PROCEDURE — 85027 COMPLETE CBC AUTOMATED: CPT | Performed by: INTERNAL MEDICINE

## 2022-03-03 PROCEDURE — 99214 OFFICE O/P EST MOD 30 MIN: CPT | Performed by: INTERNAL MEDICINE

## 2022-03-03 RX ORDER — NITROGLYCERIN 0.4 MG/1
TABLET SUBLINGUAL
Qty: 25 TABLET | Refills: 0 | Status: SHIPPED | OUTPATIENT
Start: 2022-03-03 | End: 2023-04-17

## 2022-03-03 RX ORDER — LOSARTAN POTASSIUM 50 MG/1
50 TABLET ORAL DAILY
Qty: 90 TABLET | Refills: 1 | Status: SHIPPED | OUTPATIENT
Start: 2022-03-03 | End: 2022-04-04

## 2022-03-03 RX ORDER — ATORVASTATIN CALCIUM 20 MG/1
20 TABLET, FILM COATED ORAL DAILY
Qty: 90 TABLET | Refills: 3 | Status: SHIPPED | OUTPATIENT
Start: 2022-03-03 | End: 2023-01-03

## 2022-03-03 RX ORDER — BUPROPION HYDROCHLORIDE 100 MG/1
100 TABLET, EXTENDED RELEASE ORAL DAILY
Qty: 90 TABLET | Refills: 3 | Status: SHIPPED | OUTPATIENT
Start: 2022-03-03 | End: 2022-04-26

## 2022-03-03 RX ORDER — METOPROLOL SUCCINATE 100 MG/1
100 TABLET, EXTENDED RELEASE ORAL DAILY
Qty: 90 TABLET | Refills: 2 | Status: SHIPPED | OUTPATIENT
Start: 2022-03-03 | End: 2022-09-14

## 2022-03-03 RX ORDER — DULOXETIN HYDROCHLORIDE 60 MG/1
60 CAPSULE, DELAYED RELEASE ORAL DAILY
Qty: 90 CAPSULE | Refills: 1 | Status: SHIPPED | OUTPATIENT
Start: 2022-03-03 | End: 2022-03-29

## 2022-03-03 RX ORDER — OXYCODONE HYDROCHLORIDE 5 MG/1
5 TABLET ORAL DAILY PRN
Qty: 20 TABLET | Refills: 0 | Status: SHIPPED | OUTPATIENT
Start: 2022-03-03 | End: 2022-06-27

## 2022-03-03 ASSESSMENT — PATIENT HEALTH QUESTIONNAIRE - PHQ9: SUM OF ALL RESPONSES TO PHQ QUESTIONS 1-9: 4

## 2022-03-03 ASSESSMENT — ASTHMA QUESTIONNAIRES: ACT_TOTALSCORE: 18

## 2022-03-03 ASSESSMENT — PAIN SCALES - GENERAL: PAINLEVEL: NO PAIN (0)

## 2022-03-03 NOTE — TELEPHONE ENCOUNTER
Consult received via workqueue from provider Maryan Churchill MD  for leg non healing wood.     Per Standing order Patient qualifies for ROSAURA to be scheduled prior to assessment with Patric Paulson, or Noreen ACOSTA PA-C at [facility] [place] [facility].    Routing to  Ashia Cheng.  Orders pending

## 2022-03-03 NOTE — LETTER
March 7, 2022      Kristyn E Jes  [address]  [facility] [id number]      Mert Moira,     This is to inform you regarding your test result.     Urine drug screen is satisfactory   Kidney function is slightly worse   Stay well hydrated   Recheck in 1 month.   Sugars are higher than before   HbA1c which is average glucose during last 3 months is 7.2%   Watch your diet   CBC result which includes Hemoglobin and  Platelet Counts is satisfactory.     Lab Results        Component                Value               Date                        A1C                      7.2                 03/03/2022                  A1C                      6.6                 08/27/2021                  A1C                      6.2                 08/24/2020                  A1C                      6.5                 01/20/2020                  A1C                      6.1                 09/20/2019                  A1C                      6.1                 06/19/2019                  A1C                      6.4                 03/04/2019                 Sincerely,       Dr.Nasima Kerline MD,FACP         Resulted Orders   HEMOGLOBIN A1C   Result Value Ref Range    Hemoglobin A1C 7.2 (H) 0.0 - 5.6 %      Comment:      Normal <5.7%   Prediabetes 5.7-6.4%    Diabetes 6.5% or higher     Note: Adopted from ADA consensus guidelines.   CBC with platelets   Result Value Ref Range    WBC Count 10.9 4.0 - 11.0 10e3/uL    RBC Count 5.12 3.80 - 5.20 10e6/uL    Hemoglobin 14.1 11.7 - 15.7 g/dL    Hematocrit 44.3 35.0 - 47.0 %    MCV 87 78 - 100 fL    MCH 27.5 26.5 - 33.0 pg    MCHC 31.8 31.5 - 36.5 g/dL    RDW 16.4 (H) 10.0 - 15.0 %    Platelet Count 296 150 - 450 10e3/uL   Renal panel (Alb, BUN, Ca, Cl, CO2, Creat, Gluc, Phos, K, Na)   Result Value Ref Range    Sodium 135 133 - 144 mmol/L    Potassium 4.9 3.4 - 5.3 mmol/L    Chloride 108 94 - 109 mmol/L    Carbon Dioxide (CO2) 22 20 - 32 mmol/L    Anion Gap 5 3 - 14 mmol/L    Urea  Nitrogen 34 (H) 7 - 30 mg/dL    Creatinine 1.63 (H) 0.52 - 1.04 mg/dL    Calcium 9.1 8.5 - 10.1 mg/dL    Glucose 167 (H) 70 - 99 mg/dL    Albumin 3.8 3.4 - 5.0 g/dL    Phosphorus 3.6 2.5 - 4.5 mg/dL    GFR Estimate 30 (L) >60 mL/min/1.73m2      Comment:      Effective December 21, 2021 eGFRcr in adults is calculated using the 2021 CKD-EPI creatinine equation which includes age and gender (Blayne et al., NEJ, DOI: 10.1056/DDPBam6060349)   Drug Abuse Screen Panel 13, Urine (Pain Care Package) - lab collect   Result Value Ref Range    Cannabinoids (14-atk-2-carboxy-9-THC) Not Detected Not Detected, Indeterminate      Comment:      Cutoff for a negative cannabinoid is 50 ng/mL or less.    Phencyclidine Not Detected Not Detected, Indeterminate      Comment:      Cutoff for a negative PCP is 25 ng/mL or less.    Cocaine (Benzoylecgonine) Not Detected Not Detected, Indeterminate      Comment:      Cutoff for a negative cocaine is 150 ng/ml or less.    Methamphetamine (d-Methamphetamine) Not Detected Not Detected, Indeterminate      Comment:      Cutoff for a negative methamphetamine is 500 ng/ml or less.    Opiates (Morphine) Not Detected Not Detected, Indeterminate      Comment:      Cutoff for a negative opiate is 100 ng/ml or less.    Amphetamine (d-Amphetamine) Not Detected Not Detected, Indeterminate      Comment:      Cutoff for a negative amphetamine is 500 ng/mL or less.    Benzodiazepines (Nordiazepam) Not Detected Not Detected, Indeterminate      Comment:      Cutoff for a negative benzodiazepine is 150 ng/ml or less.    Tricyclic Antidepressants (Desipramine) Not Detected Not Detected, Indeterminate      Comment:      Cutoff for a negative tricyclic antidepressant is 300 ng/ml or less.    Methadone Not Detected Not Detected, Indeterminate      Comment:      Cutoff for a negative methadone is 200 ng/ml or less.    Barbiturates (Butalbital) Not Detected Not Detected, Indeterminate      Comment:      Cutoff for a  negative barbituate is 200 ng/ml or less.    Oxycodone Not Detected Not Detected, Indeterminate      Comment:      Cutoff for a negative oxycodone is 100 ng/mL or less.    Propoxyphene (Norpropoxyphene) Not Detected Not Detected, Indeterminate      Comment:      Cutoff for a negative propoxyphene is 300 ng/ml or less.    Buprenorphine Not Detected Not Detected, Indeterminate      Comment:      Cutoff for a negative buprenorphine is 10 ng/ml or less.

## 2022-03-03 NOTE — PROGRESS NOTES
Assessment & Plan     Moira was seen today for recheck.    Diagnoses and all orders for this visit:    Cellulitis of right lower extremity  -     CBC with platelets; Future  -     Wound Care Referral; Future  -     CBC with platelets  Patient had cellulitis of right lower extremity  I saw the picture dated January 26  She had burn on her leg due to over and over  She was seen in urgent care again on February 16  She was seen by Dr. Elliott again on February 24  This wound is healing slowly  Patient thought there is no improvement but compared to the picture there is improvement  She still has nonhealing wound on her medial shin  There is some erythema  There is slight erythema on her above the ankle also  But compared to the picture from urgent care this is much better  At this point she still has some antibiotic to finish as she is taking twice a day instead of 3 times  I referred her to wound care clinic as it is taking so long  In case of worsening of symptoms seek attention    Hyperlipidemia LDL goal <70  -     atorvastatin (LIPITOR) 20 MG tablet; Take 1 tablet (20 mg) by mouth daily for cholestrol    Moderate major depression (H)  -     buPROPion (WELLBUTRIN SR) 100 MG 12 hr tablet; Take 1 tablet (100 mg) by mouth daily for mood  -     DULoxetine (CYMBALTA) 60 MG capsule; Take 1 capsule (60 mg) by mouth daily  Depression is responding to combination of Cymbalta and bupropion    Essential hypertension  -     losartan (COZAAR) 50 MG tablet; Take 1 tablet (50 mg) by mouth daily for Blood Pressure  -     metoprolol succinate ER (TOPROL-XL) 100 MG 24 hr tablet; Take 1 tablet (100 mg) by mouth daily  Blood pressure is mostly under control    Chest pain, unspecified type  -     nitroGLYcerin (NITROSTAT) 0.4 MG sublingual tablet; For chest pain place 1 tablet under the tongue every 5 minutes for 3 doses. If symptoms persist 5 minutes after 1st dose call 911.    Lumbar radiculopathy  -     oxyCODONE (ROXICODONE) 5 MG  tablet; Take 1 tablet (5 mg) by mouth daily as needed for pain  She has chronic back pain  She uses oxycodone sparingly for severe pain only  Mostly she uses Tylenol    Stage 3b chronic kidney disease (H)  -     Renal panel (Alb, BUN, Ca, Cl, CO2, Creat, Gluc, Phos, K, Na); Future  -     Renal panel (Alb, BUN, Ca, Cl, CO2, Creat, Gluc, Phos, K, Na)  She had worsening of creatinine and elevated potassium last time  I will recheck    ELIGIO on CPAP  Comments:  not using as it was recalled   She has a sleep apnea but not using the CPAP as machine was recalled and she has not received the other new machine yet    Type 2 diabetes mellitus with diabetic nephropathy, without long-term current use of insulin (H)  -     HEMOGLOBIN A1C; Future  -     HEMOGLOBIN A1C    Morbid obesity (H)  Healthy diet and exercise    Other chronic pain  -     oxyCODONE (ROXICODONE) 5 MG tablet; Take 1 tablet (5 mg) by mouth daily as needed for pain  Patient was educated about opiod pain medication. It can be habit-forming. It should be taken as prescribed. Do not mix it  with alcohol. Be careful with driving.Do not loose the  Prescription.  Do not overuse this medication. It is a controlled substance.    Controlled substance agreement signed  She signed the treatment agreement for oxycodone    Hyperkalemia  Was seen in [facility] done and emergency room  Will recheck today    Non-healing non-surgical wound  -     Wound Care Referral; Future  Referred to wound care clinic    Medication management  -     Drug Abuse Screen Panel 13, Urine (Pain Care Package) - lab collect; Future  -     Drug Abuse Screen Panel 13, Urine (Pain Care Package) - lab collect    Patient has very large ventral hernia  Is very prominent  She has seen surgeon for this  She is not willing to accept the risk of surgery  She has multiple comorbidities  At this point she is not interested in surgical fix      Disclaimer: This note consists of symbols derived from keyboarding, dictation  "and/or voice recognition software. As a result, there may be errors in the script that have gone undetected. Please consider this when interpreting information found in this chart.         BMI:   Estimated body mass index is 43.23 kg/m  as calculated from the following:    Height as of this encounter: 1.702 m (5' 7\").    Weight as of this encounter: 125.2 kg (276 lb).   Weight management plan: Discussed healthy diet and exercise guidelines    See Patient Instructions  Patient Instructions   Labs today  Make appointment with wound care clinic  Follow up in 6-8 weeks  Oxycodone  can be habit-forming. It should be taken as prescribed. Do not mix it  with alcohol. Be careful with driving.Do not loose the  Prescription.  Do not overuse this medication. It is a controlled substance.  Seek sooner medical attention if there is any worsening of symptoms or problems.        Return in about 6 weeks (around 4/14/2022) for Diabetes.    Maryan Churchill MD  [facility] FARIDA Simons is a 88 year old who presents for the following health issues     HPI     Follow up Cellulitis  -still red and swollen, does have a scab (pt has picked off some of the scab)        Review of Systems   Constitutional, HEENT, cardiovascular, pulmonary, GI, , musculoskeletal, neuro, skin, endocrine and psych systems are negative, except as otherwise noted.      Objective    /80 (BP Location: Right arm, Cuff Size: Adult Large)   Pulse 86   Temp 97.9  F (36.6  C) (Tympanic)   Resp 20   Ht 1.702 m (5' 7\")   Wt 125.2 kg (276 lb)   SpO2 97%   BMI 43.23 kg/m    Body mass index is 43.23 kg/m .  Physical Exam       GENERAL APPEARANCE: healthy, alert and no distress  EYES: Eyes grossly normal to inspection, PERRL and conjunctivae and sclerae normal  HENT: ear canals and TM's normal and nose and mouth without ulcers or lesions  NECK: no adenopathy  RESP: lungs clear to auscultation - no rales, rhonchi or wheezes  CV: " regular rates and rhythm, normal S1 S2, no S3 positive heart murmur  Wound on right leg healing slowly  She still has slight wound with scab  It has not healed up completely  There is slight erythema surrounding the wound

## 2022-03-03 NOTE — PATIENT INSTRUCTIONS
Labs today  Make appointment with wound care clinic  Follow up in 6-8 weeks  Oxycodone  can be habit-forming. It should be taken as prescribed. Do not mix it  with alcohol. Be careful with driving.Do not loose the  Prescription.  Do not overuse this medication. It is a controlled substance.  Seek sooner medical attention if there is any worsening of symptoms or problems.

## 2022-03-03 NOTE — LETTER
Opioid / Opioid Plus Controlled Substance Agreement    This is an agreement between you and your provider about the safe and appropriate use of controlled substance/opioids prescribed by your care team. Controlled substances are medicines that can cause physical and mental dependence (abuse).    There are strict laws about having and using these medicines. We here at [facility] [place] are committing to working with you in your efforts to get better. To support you in this work, we ll help you schedule regular office appointments for medicine refills. If we must cancel or change your appointment for any reason, we ll make sure you have enough medicine to last until your next appointment.     As a Provider, I will:    Listen carefully to your concerns and treat you with respect.     Recommend a treatment plan that I believe is in your best interest. This plan may involve therapies other than opioid pain medication.     Talk with you often about the possible benefits, and the risk of harm of any medicine that we prescribe for you.     Provide a plan on how to taper (discontinue or go off) using this medicine if the decision is made to stop its use.    As a Patient, I understand that opioid(s):     Are a controlled substance prescribed by my care team to help me function or work and manage my condition(s).     Are strong medicines and can cause serious side effects such as:    Drowsiness, which can seriously affect my driving ability    A lower breathing rate, enough to cause death    Harm to my thinking ability     Depression     Abuse of and addiction to this medicine    Need to be taken exactly as prescribed. Combining opioids with certain medicines or chemicals (such as illegal drugs, sedatives, sleeping pills, and benzodiazepines) can be dangerous or even fatal. If I stop opioids suddenly, I may have severe withdrawal symptoms.    Do not work for all types of pain nor for all patients. If they re not helpful, I may  be asked to stop them.        The risks, benefits and side effects of these medicine(s) were explained to me. I agree that:  1. I will take part in other treatments as advised by my care team. This may be psychiatry or counseling, physical therapy, behavioral therapy, group treatment or a referral to a specialist.     2. I will keep all my appointments. I understand that this is part of the monitoring of opioids. My care team may require an office visit for EVERY opioid/controlled substance refill. If I miss appointments or don t follow instructions, my care team may stop my medicine.    3. I will take my medicines as prescribed. I will not change the dose or schedule unless my care team tells me to. There will be no refills if I run out early.     4. I may be asked to come to the clinic and complete a urine drug test or complete a pill count at any time. If I don t give a urine sample or participate in a pill count, the care team may stop my medicine.    5. I will only receive prescriptions from this clinic for chronic pain. If I am treated by another provider for acute pain issues, I will tell them that I am taking opioid pain medication for chronic pain and that I have a treatment agreement with this provider. I will inform my [facility] care team within one business day if I am given a prescription for any pain medication by another healthcare provider. My [facility] care team can contact other providers and pharmacists about my use of any medicines.    6. It is up to me to make sure that I don t run out of my medicines on weekends or holidays. If my care team is willing to refill my opioid prescription without a visit, I must request refills only during office hours. Refills may take up to 3 business days to process. I will use one pharmacy to fill all my opioid and other controlled substance prescriptions. I will notify the clinic about any changes to my insurance or medication  availability.    7. I am responsible for my prescriptions. If the medicine/prescription is lost, stolen or destroyed, it will not be replaced. I also agree not to share controlled substance medicines with anyone.    8. I am aware I should not use any illegal or recreational drugs. I agree not to drink alcohol unless my care team says I can.       9. If I enroll in the [place] [facility] program, I will tell my care team prior to my next refill.     10. I will tell my care team right away if I become pregnant, have a new medical problem treated outside of my regular clinic, or have a change in my medications.    11. I understand that this medicine can affect my thinking, judgment and reaction time. Alcohol and drugs affect the brain and body, which can affect the safety of my driving. Being under the influence of alcohol or drugs can affect my decision-making, behaviors, personal safety, and the safety of others. Driving while impaired (DWI) can occur if a person is driving, operating, or in physical control of a car, motorcycle, boat, snowmobile, ATV, motorbike, off-road vehicle, or any other motor vehicle ([place] Statute 169A.20). I understand the risk if I choose to drive or operate any vehicle or machinery.    I understand that if I do not follow any of the conditions above, my prescriptions or treatment may be stopped or changed.          Opioids  What You Need to Know    What are opioids?   Opioids are pain medicines that must be prescribed by a doctor. They are also known as narcotics.     Examples are:   1. morphine (MS Contin, Liz)  2. oxycodone (Oxycontin)  3. oxycodone and acetaminophen (Percocet)  4. hydrocodone and acetaminophen (Vicodin, Norco)   5. fentanyl patch (Duragesic)   6. hydromorphone (Dilaudid)   7. methadone  8. codeine (Tylenol #3)     What do opioids do well?   Opioids are best for severe short-term pain such as after a surgery or injury. They may work well for cancer pain. They may  help some people with long-lasting (chronic) pain.     What do opioids NOT do well?   Opioids never get rid of pain entirely, and they don t work well for most patients with chronic pain. Opioids don t reduce swelling, one of the causes of pain.                                    Other ways to manage chronic pain and improve function include:       Treat the health problem that may be causing pain    Anti-inflammation medicines, which reduce swelling and tenderness, such as ibuprofen (Advil, Motrin) or naproxen (Aleve)    Acetaminophen (Tylenol)    Antidepressants and anti-seizure medicines, especially for nerve pain    Topical treatments such as patches or creams    Injections or nerve blocks    Chiropractic or osteopathic treatment    Acupuncture, massage, deep breathing, meditation, visual imagery, aromatherapy    Use heat or ice at the pain site    Physical therapy     Exercise    Stop smoking    Take part in therapy       Risks and side effects     Talk to your doctor before you start or decide to keep taking opioids. Possible side effects include:      Lowering your breathing rate enough to cause death    Overdose, including death, especially if taking higher than prescribed doses    Worse depression symptoms; less pleasure in things you usually enjoy    Feeling tired or sluggish    Slower thoughts or cloudy thinking    Being more sensitive to pain over time; pain is harder to control    Trouble sleeping or restless sleep    Changes in hormone levels (for example, less testosterone)    Changes in sex drive or ability to have sex    Constipation    Unsafe driving    Itching and sweating    Dizziness    Nausea, throwing up and dry mouth    What else should I know about opioids?    Opioids may lead to dependence, tolerance, or addiction.      Dependence means that if you stop or reduce the medicine too quickly, you will have withdrawal symptoms. These include loose poop (diarrhea), jitters, flu-like symptoms,  nervousness and tremors. Dependence is not the same as addiction.                       Tolerance means needing higher doses over time to get the same effect. This may increase the chance of serious side effects.      Addiction is when people improperly use a substance that harms their body, their mind or their relations with others. Use of opiates can cause a relapse of addiction if you have a history of drug or alcohol abuse.      People who have used opioids for a long time may have a lower quality of life, worse depression, higher levels of pain and more visits to doctors.    You can overdose on opioids. Take these steps to lower your risk of overdose:    1. Recognize the signs:  Signs of overdose include decrease or loss of consciousness (blackout), slowed breathing, trouble waking up and blue lips. If someone is worried about overdose, they should call 911.    2. Talk to your doctor about Narcan (naloxone).   If you are at risk for overdose, you may be given a prescription for Narcan. This medicine very quickly reverses the effects of opioids.   If you overdose, a friend or family member can give you Narcan while waiting for the ambulance. They need to know the signs of overdose and how to give Narcan.     3. Don't use alcohol or street drugs.   Taking them with opioids can cause death.    4. Do not take any of these medicines unless your doctor says it s OK. Taking these with opioids can cause death:    Benzodiazepines, such as lorazepam (Ativan), alprazolam (Xanax) or diazepam (Valium)    Muscle relaxers, such as cyclobenzaprine (Flexeril)    Sleeping pills like zolpidem (Ambien)     Other opioids      How to keep you and other people safe while taking opioids:    1. Never share your opioids with others.  Opioid medicines are regulated by the Drug Enforcement Agency (ONEL). Selling or sharing medications is a criminal act.    2. Be sure to store opioids in a secure place, locked up if possible. Young children  can easily swallow them and overdose.    3. When you are traveling with your medicines, keep them in the original bottles. If you use a pill box, be sure you also carry a copy of your medicine list from your clinic or pharmacy.    4. Safe disposal of opioids    Most pharmacies have places to get rid of medicine, called disposal kiosks. Medicine disposal options are also available in every [facility]. Search your county and  medication disposal  to find more options. You can find more details at:  [email or web address]     I agree that my provider, clinic care team, and pharmacy may work with any city, state or federal law enforcement agency that investigates the misuse, sale, or other diversion of my controlled medicine. I will allow my provider to discuss my care with, or share a copy of, this agreement with any other treating provider, pharmacy or emergency room where I receive care.    I have read this agreement and have asked questions about anything I did not understand.    _______________________________________________________  Patient Signature - Moira Andre _____________________                   Date     _______________________________________________________  Provider Signature - Maryan Churchill MD   _____________________                   Date     _______________________________________________________  Witness Signature (required if provider not present while patient signing)   _____________________                   Date

## 2022-03-04 LAB
ALBUMIN SERPL-MCNC: 3.8 G/DL (ref 3.4–5)
ANION GAP SERPL CALCULATED.3IONS-SCNC: 5 MMOL/L (ref 3–14)
BUN SERPL-MCNC: 34 MG/DL (ref 7–30)
CALCIUM SERPL-MCNC: 9.1 MG/DL (ref 8.5–10.1)
CHLORIDE BLD-SCNC: 108 MMOL/L (ref 94–109)
CO2 SERPL-SCNC: 22 MMOL/L (ref 20–32)
CREAT SERPL-MCNC: 1.63 MG/DL (ref 0.52–1.04)
GFR SERPL CREATININE-BSD FRML MDRD: 30 ML/MIN/1.73M2
GLUCOSE BLD-MCNC: 167 MG/DL (ref 70–99)
PHOSPHATE SERPL-MCNC: 3.6 MG/DL (ref 2.5–4.5)
POTASSIUM BLD-SCNC: 4.9 MMOL/L (ref 3.4–5.3)
SODIUM SERPL-SCNC: 135 MMOL/L (ref 133–144)

## 2022-03-05 DIAGNOSIS — N18.30 STAGE 3 CHRONIC KIDNEY DISEASE, UNSPECIFIED WHETHER STAGE 3A OR 3B CKD (H): Primary | ICD-10-CM

## 2022-03-05 NOTE — RESULT ENCOUNTER NOTE
Please notify patient by sending following letter with copy of test results      Mert Pizarro,    This is to inform you regarding your test result.    Urine drug screen is satisfactory   Kidney function is slightly worse  Stay well hydrated   Recheck in 1 month.  Sugars are higher than before   HbA1c which is average glucose during last 3 months is 7.2%  Watch your diet  CBC result which includes Hemoglobin and  Platelet Counts is satisfactory.    Lab Results       Component                Value               Date                       A1C                      7.2                 03/03/2022                 A1C                      6.6                 08/27/2021                 A1C                      6.2                 08/24/2020                 A1C                      6.5                 01/20/2020                 A1C                      6.1                 09/20/2019                 A1C                      6.1                 06/19/2019                 A1C                      6.4                 03/04/2019                Sincerely,      Dr.Nasima Kerline MD,FACP

## 2022-03-09 NOTE — TELEPHONE ENCOUNTER
Scheduled as follows:    Future Appointments   Date Time Provider Department Center   4/11/2022  9:45 AM [facility]   4/11/2022 10:40 AM Jamarcus Spivey [facility]   4/26/2022 10:30 AM Maryan Churchill MD [facility]    4/29/2022  8:00 AM Leighton Patel MD [facility]   5/2/2022 11:00 AM Jamarcus Spivey [facility]   5/27/2022 11:00 AM Maryan Churchill MD [facility]          Ashia Cheng    [facility]   [phone]

## 2022-03-15 DIAGNOSIS — F32.1 MODERATE MAJOR DEPRESSION (H): ICD-10-CM

## 2022-03-15 DIAGNOSIS — R07.9 CHEST PAIN, UNSPECIFIED TYPE: ICD-10-CM
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
## 2022-03-16 NOTE — TELEPHONE ENCOUNTER
Called patient to verify if pharmacy is just requesting Rx to have on file or if using this frequently     Patient Contact    Attempt # 1    Was call answered?  No.  Left message on voicemail with information to call back.    Lucia MCKNIGHT, Triage RN  [facility]

## 2022-03-17 RX ORDER — NITROGLYCERIN 0.4 MG/1
TABLET SUBLINGUAL
Qty: 25 TABLET | Refills: 0 | OUTPATIENT
Start: 2022-03-17

## 2022-03-28 ENCOUNTER — TRANSFERRED RECORDS (OUTPATIENT)
Dept: HEALTH INFORMATION MANAGEMENT | Facility: CLINIC | Age: 87
End: 2022-03-28
Payer: COMMERCIAL

## 2022-03-29 RX ORDER — DULOXETIN HYDROCHLORIDE 60 MG/1
60 CAPSULE, DELAYED RELEASE ORAL DAILY
Qty: 30 CAPSULE | Refills: 0 | Status: SHIPPED | OUTPATIENT
Start: 2022-03-29 | End: 2022-04-26

## 2022-03-29 NOTE — TELEPHONE ENCOUNTER
PCP patient requesting temporary supply of cymbalta to be sent to local pharmacy while patient is waiting to get scripts from mail in pharmacy. Pended script for 30 day 0 refills. patient reports they are currently out of medication. Routing refill request to provider for review/approval because:  Drug interaction warning        Writer called patient, who states they do not need refill of nitroglycerine.       Patient was given local print script for Cymbalta, swithcing over to [facility] but needing temporary supply 3/3/22 to be sent to [facility] [address].     Patient requesting to confirm wound care clinic appt: writer reviewed.   APR 11 2022 10:40 AM - New Visit  [facility] [place] - Jamarcus Spivey       Callback: [phone]- okay to leave detailed VM.     Meaghan Gongora RN  [facility]

## 2022-03-29 NOTE — TELEPHONE ENCOUNTER
Pharmacy is still faxing requesting refill of nitrostat.  Patient did not return call    Called out to pharmacy, appears request is for profile.  Tech cancelled request and will advise patient to call clinic when refill is needed.    Triage - if one more call could be made to patient to verify if needed (that would be change in use/frequency of nitrostat if needed)    Yodit Mcbride, RT (R)

## 2022-04-02 DIAGNOSIS — I10 ESSENTIAL HYPERTENSION: ICD-10-CM

## 2022-04-02 NOTE — TELEPHONE ENCOUNTER
[facility] is requesting  Confirmation of Losartan 50 mg tablet prescription. According to their records the patient has indicated an allergy to Ace-inhibitors and wants verification that the prescriber is aware of this.

## 2022-04-04 RX ORDER — LOSARTAN POTASSIUM 50 MG/1
50 TABLET ORAL DAILY
Qty: 90 TABLET | Refills: 1 | Status: SHIPPED | OUTPATIENT
Start: 2022-03-03 | End: 2022-06-27

## 2022-04-04 RX ORDER — LOSARTAN POTASSIUM 50 MG/1
50 TABLET ORAL DAILY
Qty: 90 TABLET | Refills: 1 | Status: CANCELLED | OUTPATIENT
Start: 2022-04-04

## 2022-04-04 NOTE — TELEPHONE ENCOUNTER
"Losartan 50 mg ordered 3/3/2022. Ws ordered as \"local print\", writer reordered medication as escribe\" with original start daet 3/3/2022 to [facility].    Meaghan Gongora RN  [facility]    "

## 2022-04-04 NOTE — TELEPHONE ENCOUNTER
Called patient and confirmed that she is not having any reaction to losartan.  No angioedema noted per patient.    Called pharmacy and spoke with An pharmacist at [facility] and gave ok for refill.  Carmen Weiss RN

## 2022-04-04 NOTE — TELEPHONE ENCOUNTER
"Losartan 50 mg ordered 3/3/2022. Ws ordered as \"local print\", writer reordered medication as escrobe\" with original start daet 3/3/2022.     Meaghan Gongora RN  [facility]        "

## 2022-04-04 NOTE — TELEPHONE ENCOUNTER
PCP please confirm ok to continue use of losartan due to indicated lalergy to Ace-inhibitors.    Carmen Weiss RN

## 2022-04-11 ENCOUNTER — HOSPITAL ENCOUNTER (OUTPATIENT)
Dept: WOUND CARE | Facility: CLINIC | Age: 87
Discharge: HOME | End: 2022-04-11
Attending: INTERNAL MEDICINE | Admitting: INTERNAL MEDICINE
Payer: COMMERCIAL

## 2022-04-11 VITALS
SYSTOLIC BLOOD PRESSURE: 162 MMHG | HEIGHT: 65 IN | TEMPERATURE: 97 F | BODY MASS INDEX: 47.48 KG/M2 | DIASTOLIC BLOOD PRESSURE: 80 MMHG | HEART RATE: 79 BPM | RESPIRATION RATE: 22 BRPM | WEIGHT: 285 LBS

## 2022-04-11 DIAGNOSIS — L03.115 CELLULITIS OF RIGHT LOWER EXTREMITY: ICD-10-CM

## 2022-04-11 DIAGNOSIS — T14.8XXA NON-HEALING NON-SURGICAL WOUND: ICD-10-CM

## 2022-04-11 DIAGNOSIS — L97.911 ULCER OF RIGHT LOWER EXTREMITY, LIMITED TO BREAKDOWN OF SKIN (H): ICD-10-CM

## 2022-04-11 PROCEDURE — 97597 DBRDMT OPN WND 1ST 20 CM/<: CPT | Performed by: SURGERY

## 2022-04-11 PROCEDURE — G0463 HOSPITAL OUTPT CLINIC VISIT: HCPCS | Mod: 25

## 2022-04-11 PROCEDURE — 99203 OFFICE O/P NEW LOW 30 MIN: CPT | Mod: 25 | Performed by: SURGERY

## 2022-04-11 NOTE — PROGRESS NOTES
[facility] [place] Progress Note    Subject: Moira Andre comes to see us today for evaluation of a traumatic right lower anterior tibial ulcer.  This 88-year-old patient who lives independently hit her right tibial area against the open oven door approximately 6 weeks ago.  She did have a small laceration with no excessive bleeding.  The ulcer is been slow to heal but improving.  Did have some border erythema initially but no evidence of cellulitis.  Due to the slow healing was requested please see her today at the wound clinic.    He does have a diagnosis of lymphedema more in her right and left legs with dependent edema.  This may have been related to her bilateral knee replacement surgery.  She does not wear compression since the swelling is not overly significant and very difficult for her to apply compression stockings.    She also has a history of lumbar disc disease primarily involving the right leg.  She does get periodic steroid injections which have been working quite well.  Uses icy gel spray on her feet which also is helpful.    Did have a MI years ago and a coronary stent placed 2007.  No cardiac issues since.  Does have diabetes.  Does not check her blood sugars on a routine basis at home.  Last month A1c= 7.2  Non-smoker.  Uses a wheeled walker when she is out of her home.    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     Myocardial infarction (H)  9/2007, cath 2013 ml    BMS: stent to OM, diag, nl EF, echo /C angia 2013 , f/u cath no lesion >40%     Nephrolithiasis     right side     OA (osteoarthritis) of knee      Obesity      Rheumatoid arthritis flare (H)     prednisone     Sleep apnea     restarted using cpap 2017     TIA (transient ischaemic attack)      Ventral hernia, unspecified, without mention of obstruction or gangrene      Patient Active Problem List   Diagnosis     CKD (chronic kidney disease) stage 3, GFR 30-59 ml/min (H)     Morbid obesity (H)     CAD (coronary artery disease)     Type 2 diabetes mellitus with diabetic nephropathy (H)     Transient cerebral ischemia     Hyperlipidemia LDL goal <70     Ventral hernia     Intermittent asthma     Moderate major depression (H)     ELIGIO on CPAP     Microalbuminuria     S/P total knee arthroplasty     OA (osteoarthritis) of knee     Anemia due to blood loss, acute     Health Care Home     Essential hypertension     Gastroesophageal reflux disease without esophagitis     Bilateral edema of lower extremity     Osteoarthritis     Rheumatoid arthritis involving both hands with negative rheumatoid factor (H)     High risk medication use     Anxiety     Other chronic pain     Controlled substance agreement signed     Edema of lower extremity     Nephrolithiasis     Kidney stone     Tremor     Atypical chest pain     ACS (acute coronary syndrome) (H)     Non-rheumatic mitral valve stenosis     Coronary artery calcification seen on CAT scan     Lumbar radiculopathy     Back muscle spasm     Obesity hypoventilation syndrome (H)     Social Hx:   Social History     Socioeconomic History     Marital status:      Spouse name: Not on file     Number of children: Not on file     Years of education: Not on file     Highest education level: Not on file   Occupational History     Not on file   Tobacco Use     Smoking status: Former Smoker     Packs/day: 0.25     Types: Cigarettes     Start date: 1972     Quit  date: 1973     Years since quittin.9     Smokeless tobacco: Never Used   Substance and Sexual Activity     Alcohol use: Yes     Alcohol/week: 0.0 - 1.0 standard drinks     Comment: rarely     Drug use: No     Sexual activity: Not Currently     Partners: Male   Other Topics Concern     Parent/sibling w/ CABG, MI or angioplasty before 65F 55M? Not Asked      Service Not Asked     Blood Transfusions Not Asked     Caffeine Concern No     Occupational Exposure Not Asked     Hobby Hazards Not Asked     Sleep Concern No     Stress Concern No     Weight Concern No     Special Diet No     Back Care Not Asked     Exercise Yes     Comment: walking, swimming x2 a week     Bike Helmet Not Asked     Seat Belt Yes     Self-Exams Not Asked   Social History Narrative    , 1 step son    Work- clown for profession        Tobacco- none,smoked for couple months in     ETOH- occ 1/2 months    Diet coke- 2-3 cans/day    Exercise- swims 8 laps -3/week             Social Determinants of Health     Financial Resource Strain: Not on file   Food Insecurity: No Food Insecurity     Worried About Running Out of Food in the Last Year: Never true     Ran Out of Food in the Last Year: Never true   Transportation Needs: No Transportation Needs     Lack of Transportation (Medical): No     Lack of Transportation (Non-Medical): No   Physical Activity: Not on file   Stress: No Stress Concern Present     Feeling of Stress : Not at all   Social Connections: Unknown     Frequency of Communication with Friends and Family: Not on file     Frequency of Social Gatherings with Friends and Family: Not on file     Attends Shinto Services: Not on file     Active Member of Clubs or Organizations: Not on file     Attends Club or Organization Meetings: Not on file     Marital Status:    Intimate Partner Violence: Not on file   Housing Stability: Not on file       Surgical Hx:   Past Surgical History:   Procedure Laterality  Date     APPENDECTOMY       BIOPSY BREAST      x2 -needle & lumpectomy-benign     CHOLECYSTECTOMY       CORONARY ANGIOGRAPHY ADULT ORDER  9/28/2007    Bare metal stent to OM1, Diagonal patent      CORONARY ANGIOGRAPHY ADULT ORDER  9/25/2007    Stover stent to Diagonal     HC LEFT HEART CATHETERIZATION  8/2013    Moderate CAD     HYSTERECTOMY TOTAL ABDOMINAL       ORTHOPEDIC SURGERY      knee replacement on right side (2006), Left side (2016)     RELEASE CARPAL TUNNEL      right and left     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      Lisinopril Cough     Metformin      Elevated lactic acid     Minocycline      Yellow Dye Allergy. Minocycline has Yellow Dye #10.     Salicylates Hives     Yellow Dye Hives     Rxn to yellow tablet. Eyes swelled shut.      Yellow Dyes Hives       Medications:   Current Outpatient Medications   Medication     acetaminophen (TYLENOL) 500 MG tablet     albuterol (PROAIR HFA/PROVENTIL HFA/VENTOLIN HFA) 108 (90 Base) MCG/ACT inhaler     amLODIPine (NORVASC) 5 MG tablet     atorvastatin (LIPITOR) 20 MG tablet     blood glucose (TRUE METRIX BLOOD GLUCOSE TEST) test strip     Blood Glucose Calibration (TRUE METRIX LEVEL 1) Low SOLN     blood glucose monitoring (NO BRAND SPECIFIED) meter device kit     Blood Glucose Monitoring Suppl (TRUE METRIX AIR GLUCOSE METER) w/Device KIT     buPROPion (WELLBUTRIN SR) 100 MG 12 hr tablet     Cholecalciferol (VITAMIN D) 2000 UNITS CAPS     clopidogrel (PLAVIX) 75 MG tablet     DULoxetine (CYMBALTA) 60 MG capsule     fluticasone (FLONASE) 50 MCG/ACT nasal spray     fluticasone-salmeterol (ADVAIR) 100-50 MCG/DOSE inhaler     glimepiride (AMARYL) 2 MG tablet     IBANdronate (BONIVA) 150 MG tablet     Lidocaine (LIDOCARE) 4 % Patch     losartan (COZAAR) 50 MG tablet     Menthol, Topical Analgesic, (ICY HOT EX)     metoprolol succinate ER  (TOPROL-XL) 100 MG 24 hr tablet     nitroGLYcerin (NITROSTAT) 0.4 MG sublingual tablet     order for DME     order for DME     oxyCODONE (ROXICODONE) 5 MG tablet     pantoprazole (PROTONIX) 20 MG EC tablet     polyethylene glycol (MIRALAX) 17 g packet     triamcinolone (KENALOG) 0.1 % external cream     TRUEplus Lancets 33G MISC     No current facility-administered medications for this encounter.       Labs:   Recent Labs   Lab Test 03/03/22  1208 02/17/21  1758 08/24/20  0951 08/15/17  0937 07/19/17  0655   ALBUMIN 3.8   < > 3.2*   < >  --    HGB 14.1   < >  --    < > 12.7   INR  --   --   --   --  0.93   WBC 10.9   < >  --    < > 8.3   A1C 7.2*   < > 6.2*   < >  --    CRP  --   --  10.0*   < >  --     < > = values in this interval not displayed.     Creatinine   Date Value Ref Range Status   03/03/2022 1.63 (H) 0.52 - 1.04 mg/dL Final   02/18/2021 1.50 (H) 0.52 - 1.04 mg/dL Final     GFR Estimate   Date Value Ref Range Status   03/03/2022 30 (L) >60 mL/min/1.73m2 Final     Comment:     Effective December 21, 2021 eGFRcr in adults is calculated using the 2021 CKD-EPI creatinine equation which includes age and gender (Blayne vuong al., NEJ, DOI: 10.1056/YGOXsj1314554)   02/18/2021 31 (L) >60 mL/min/[1.73_m2] Final     Comment:     Non  GFR Calc  Starting 12/18/2018, serum creatinine based estimated GFR (eGFR) will be   calculated using the Chronic Kidney Disease Epidemiology Collaboration   (CKD-EPI) equation.       GFR Estimate If Black   Date Value Ref Range Status   02/18/2021 36 (L) >60 mL/min/[1.73_m2] Final     Comment:      GFR Calc  Starting 12/18/2018, serum creatinine based estimated GFR (eGFR) will be   calculated using the Chronic Kidney Disease Epidemiology Collaboration   (CKD-EPI) equation.       WBC   Date Value Ref Range Status   02/18/2021 10.3 4.0 - 11.0 10e9/L Final     WBC Count   Date Value Ref Range Status   03/03/2022 10.9 4.0 - 11.0 10e3/uL Final     Lab Results  "  Component Value Date    CR 1.63 03/03/2022    CR 1.50 02/18/2021        Nutrition requirements were discussed with patient today.  Objective:  BP (!) 162/80 (BP Location: Left arm)   Pulse 79   Temp 97  F (36.1  C) (Temporal)   Resp 22   Ht 1.651 m (5' 5\")   Wt 129.3 kg (285 lb)   BMI 47.43 kg/m          General:  Patient is alert and orientated, no acute distress.  Very comfortable.  Talkative.  Obese with BMI= 47.4 kg meter square.  Weight= 129 kg  Chest=clear to auscultation with no wheezing.  Extremities= mild swelling right ankle.  Right and left mid calf measures 39 cm.    Right distal calf 26 cm versus 24.5 cm on left  Laceration over right mid anterior lateral calf with a scab over the central portion.  No induration.  No cellulitis.  No tenderness.      Vascular: +3 palpable PT pulses bilateral with weak right DP.  Triphasic +3 waveforms in both right and left PT is by bedside Doppler    Procedure:   Patient was determined to be capable of making their own medical decisions and informed consent was obtained, topical anesthetic of 4% lidocaine was applied, debridement was performed.  Remove the scab.  There is a small ulcer at the base measuring 0.2 x 0.3 cm with essentially no depth and very healthy tissue with no undermining.  This was a selective debridement.   No bleeding.  Patient tolerated procedure well.    Impression: #1.  Traumatic right tibial ulcer.  This is almost healed over.  We will place a small amount of wound gel and Band-Aid over the area and expect this to heal very rapidly.     #2.  No evidence of arterial insufficiency.     #3.  Chronic lymphedema which is relatively mild but more prominent right.  I recommended the use of EdemaWear since this will be easier for her to apply in the graduated compression stockings to see if this helps the swelling.  We have given her Spandage to use until she can obtain the more formal durable EdemaWear  Barriers to healing include: " Diabetes-discussed with patient including the importance of checking her blood sugars at home.    Plan:  We will dress the wounds with IntraSite gel and Band-Aid along with EdemaWear till ulcer is completely healed.  May want to use EdemaWear chronically due to the swelling.  Patient will return to the clinic as needed          Jamarcus Spivey on 4/11/2022 at 10:54 AM    Mid anterior lateral calf with no induration or cellulitis.  Dictated using Dragon voice recognition software which may result in transcription errors      Further instructions from your care team       Moira Andre      9/24/1933    Wound care recommendations to Right anterior lower leg  Cleanse with mild unscented soap and water.  Apply hydrogel (wound gel) to the wound/reddened area and cover with a bandaid.  Then apply orange stripe edemawear from toes to knee on both lower legs. Edemawear should be worn 24/7 unless bathing/showering or changing the dressing. DO NOT CUT THE EDEMAWEAR. IF IT IS TOO LONG THEN CUFF THE EDEMAWEAR (the edemawear can shrink length wise with washing)     Jamarcus Spivey M.D. April 11, 2022      Call us at [phone] if you have any questions about your wounds, have redness or swelling around your wound, have a fever of 101 or greater or if you have any other problems or concerns. We answer the phone Monday through Friday 8 am to 4 pm, please leave a message as we check the voicemail frequently throughout the day.     If you had a positive experience please indicate that on your patient satisfaction survey form that [facility] will be sending you.    It was a pleasure meeting with you today.  Thank you for allowing me and my team the privilege of caring for you today.  YOU are the reason we are here, and I truly hope we provided you with the excellent service you deserve.  Please let us know if there is anything else we can do for you so that we can be sure you are leaving completely satisfied with  your care experience.      If you have any billing related questions please call the [facility] Business office at [phone]. The clinic staff does not handle billing related matters.

## 2022-04-11 NOTE — PROGRESS NOTES
Patient arrived for wound care visit. Certified Wound Care Nurse time spent evaluating patient record, completed a full evaluation and documented wound(s) & morgan-wound skin; provided recommendation based on treatment plan. Applied dressing, reviewed discharge instructions, patient education, and discussed plan of care with appropriate medical team staff members and patient and/or family members.

## 2022-04-11 NOTE — DISCHARGE INSTRUCTIONS
Moira Andre      9/24/1933    Wound care recommendations to Right anterior lower leg  Cleanse with mild unscented soap and water.  Apply hydrogel (wound gel) to the wound/reddened area and cover with a bandaid.  Then apply orange stripe edemawear from toes to knee on both lower legs. Edemawear should be worn 24/7 unless bathing/showering or changing the dressing. DO NOT CUT THE EDEMAWEAR. IF IT IS TOO LONG THEN CUFF THE EDEMAWEAR (the edemawear can shrink length wise with washing)     Jamarcus Spivey M.D. April 11, 2022      Call us at [phone] if you have any questions about your wounds, have redness or swelling around your wound, have a fever of 101 or greater or if you have any other problems or concerns. We answer the phone Monday through Friday 8 am to 4 pm, please leave a message as we check the voicemail frequently throughout the day.     If you had a positive experience please indicate that on your patient satisfaction survey form that [facility] will be sending you.    It was a pleasure meeting with you today.  Thank you for allowing me and my team the privilege of caring for you today.  YOU are the reason we are here, and I truly hope we provided you with the excellent service you deserve.  Please let us know if there is anything else we can do for you so that we can be sure you are leaving completely satisfied with your care experience.      If you have any billing related questions please call the [facility] Business office at [phone]. The clinic staff does not handle billing related matters.

## 2022-04-22 DIAGNOSIS — M85.80 OSTEOPENIA WITH HIGH RISK OF FRACTURE: ICD-10-CM

## 2022-04-22 DIAGNOSIS — J45.20 MILD INTERMITTENT ASTHMA WITHOUT COMPLICATION: Primary | ICD-10-CM

## 2022-04-22 DIAGNOSIS — F32.1 MODERATE MAJOR DEPRESSION (H): ICD-10-CM

## 2022-04-26 ENCOUNTER — OFFICE VISIT (OUTPATIENT)
Dept: FAMILY MEDICINE | Facility: CLINIC | Age: 87
End: 2022-04-26
Payer: COMMERCIAL

## 2022-04-26 VITALS
HEIGHT: 65 IN | TEMPERATURE: 98 F | BODY MASS INDEX: 46.82 KG/M2 | RESPIRATION RATE: 20 BRPM | SYSTOLIC BLOOD PRESSURE: 139 MMHG | OXYGEN SATURATION: 97 % | WEIGHT: 281 LBS | DIASTOLIC BLOOD PRESSURE: 83 MMHG | HEART RATE: 71 BPM

## 2022-04-26 DIAGNOSIS — N18.32 STAGE 3B CHRONIC KIDNEY DISEASE (H): ICD-10-CM

## 2022-04-26 DIAGNOSIS — G47.33 OSA ON CPAP: ICD-10-CM

## 2022-04-26 DIAGNOSIS — F32.1 MODERATE MAJOR DEPRESSION (H): ICD-10-CM

## 2022-04-26 DIAGNOSIS — Z13.0 SCREENING FOR DEFICIENCY ANEMIA: ICD-10-CM

## 2022-04-26 DIAGNOSIS — L03.115 CELLULITIS OF RIGHT LOWER EXTREMITY: Primary | ICD-10-CM

## 2022-04-26 DIAGNOSIS — I10 ESSENTIAL HYPERTENSION: ICD-10-CM

## 2022-04-26 DIAGNOSIS — E11.21 TYPE 2 DIABETES MELLITUS WITH DIABETIC NEPHROPATHY, WITHOUT LONG-TERM CURRENT USE OF INSULIN (H): ICD-10-CM

## 2022-04-26 DIAGNOSIS — R60.0 EDEMA OF LOWER EXTREMITY: ICD-10-CM

## 2022-04-26 PROBLEM — L97.911 ULCER OF RIGHT LOWER EXTREMITY, LIMITED TO BREAKDOWN OF SKIN (H): Status: RESOLVED | Noted: 2022-04-11 | Resolved: 2022-04-26

## 2022-04-26 PROCEDURE — 99214 OFFICE O/P EST MOD 30 MIN: CPT | Performed by: INTERNAL MEDICINE

## 2022-04-26 PROCEDURE — 80069 RENAL FUNCTION PANEL: CPT | Performed by: INTERNAL MEDICINE

## 2022-04-26 PROCEDURE — 82728 ASSAY OF FERRITIN: CPT | Performed by: INTERNAL MEDICINE

## 2022-04-26 PROCEDURE — 36415 COLL VENOUS BLD VENIPUNCTURE: CPT | Performed by: INTERNAL MEDICINE

## 2022-04-26 RX ORDER — FLUTICASONE PROPIONATE AND SALMETEROL 100; 50 UG/1; UG/1
1 POWDER RESPIRATORY (INHALATION) EVERY 12 HOURS
Qty: 60 EACH | Refills: 4 | Status: SHIPPED | OUTPATIENT
Start: 2022-04-26 | End: 2022-11-09

## 2022-04-26 RX ORDER — DULOXETIN HYDROCHLORIDE 60 MG/1
CAPSULE, DELAYED RELEASE ORAL
Qty: 30 CAPSULE | Refills: 0 | Status: SHIPPED | OUTPATIENT
Start: 2022-04-26 | End: 2022-06-27

## 2022-04-26 RX ORDER — BUPROPION HYDROCHLORIDE 100 MG/1
100 TABLET, EXTENDED RELEASE ORAL DAILY
Qty: 90 TABLET | Refills: 3 | Status: SHIPPED | OUTPATIENT
Start: 2022-04-26 | End: 2023-02-24

## 2022-04-26 RX ORDER — IBANDRONATE SODIUM 150 MG/1
TABLET, FILM COATED ORAL
Qty: 3 TABLET | Refills: 3 | Status: SHIPPED | OUTPATIENT
Start: 2022-04-26 | End: 2023-06-15

## 2022-04-26 ASSESSMENT — PAIN SCALES - GENERAL: PAINLEVEL: NO PAIN (0)

## 2022-04-26 NOTE — PROGRESS NOTES
Assessment & Plan     Moira was seen today for recheck.    Diagnoses and all orders for this visit:    Cellulitis of right lower extremity:  This has improved remarkably  The wound she had due to burn she got from hot oven  It healed very slowly but has healed up completely  Was no redness      Edema of lower extremity  She has chronic lower extremity edema  Discussed with her about wrapping her legs and wearing compression socks      Moderate major depression (H)  She is on duloxetine and bupropion   This is working well    Essential hypertension  She is on losartan and metoprolol    Stage 3b chronic kidney disease (H)  -     Renal panel (Alb, BUN, Ca, Cl, CO2, Creat, Gluc, Phos, K, Na); Future  -     Renal panel (Alb, BUN, Ca, Cl, CO2, Creat, Gluc, Phos, K, Na)  Chronic and is stable  I started her on Jardiance  Discontinued her glimepiride    ELIGIO on CPAP  Noncompliant as her CPAP machine was recalled  She has not received the new machine yet    Type 2 diabetes mellitus with diabetic nephropathy, without long-term current use of insulin (H)  -     empagliflozin (JARDIANCE) 10 MG TABS tablet; Take 1 tablet (10 mg) by mouth daily for diabetes  She is on glimepiride which I discontinued and put her on Jardiance due to her risk factor  Jardiance is good for diabetes, for heart and kidneys    Screening for deficiency anemia  -     Ferritin; Future  -     Ferritin      [id number]}     See Patient Instructions  Patient Instructions   Labs today  Start taking Jardiance for diabetes   Jardiance is good for heart and kidneys also  Discontinue glimepiride when you start Jardiance.  Follow up in 2 months  Cancel 5/27 appointment   Seek sooner medical attention if there is any worsening of symptoms or problems.       Return in about 2 months (around 6/26/2022) for medication follow up, Diabetes.    Maryan Churchill MD  [facility] FARIDA Simons is a 88 year old who presents for the following [facility]  "issues     HPI     Chief Complaint   Patient presents with     RECHECK     Cellulitis on right shin       Patient walks with the help of walker and she is a still able to drive  Her cellulitis is improving but her leg edema is getting worse  She will start wearing compression socks  They were very expensive so she did not buy that but she is planning to use as strep  Review of Systems   Constitutional, HEENT, cardiovascular, pulmonary, GI, , musculoskeletal, neuro, skin, endocrine and psych systems are negative, except as otherwise noted.      Objective    /83   Pulse 71   Temp 98  F (36.7  C) (Tympanic)   Resp 20   Ht 1.651 m (5' 5\")   Wt 127.5 kg (281 lb)   SpO2 97%   BMI 46.76 kg/m    Body mass index is 46.76 kg/m .  Physical Exam           GENERAL APPEARANCE: healthy, alert and no distress  She has bilateral leg edema  The wound on right her leg has healed nicely  There are no signs of any infection  She is morbidly obese and walks with the help of walker  She has very prominent ventral hernia but not willing to get it fixed due to multiple comorbidities she is reluctant to do that      Disclaimer: This note consists of symbols derived from keyboarding, dictation and/or voice recognition software. As a result, there may be errors in the script that have gone undetected. Please consider this when interpreting information found in this chart.            "

## 2022-04-26 NOTE — PATIENT INSTRUCTIONS
Labs today  Start taking Jardiance for diabetes   Jardiance is good for heart and kidneys also  Discontinue glimepiride when you start Jardiance.  Follow up in 2 months  Cancel 5/27 appointment   Seek sooner medical attention if there is any worsening of symptoms or problems.

## 2022-04-26 NOTE — PROGRESS NOTES
Rheumatology Clinic Visit  [facility]  Leighton Patel M.D.     Moira Andre MRN# [id number]   YOB: 1933 Age: 88 year old     Date of Visit: 04/29/2022  Primary care provider: Maryan Churchill          Assessment and Plan:   #Chronic, progressive arthralgia involving hands and feet  # AODM  # Valvular heart disease  # Probable eczema, L foot  # Spinal stenosis  # Osteoarthritis, knees, s/p TKR bilateral    Principal symptom is worsening joint pain affecting multiple finger joints and hands as well as mid feet and toes over many months to years.  Physical exam today reveals abundant changes of angulation, bony enlargement in DIPs and PIPs, as well as first MCPs in the hands.  Feet show no synovitis or inflammatory changes.  Laboratory evaluation on April 26, 2022 showed creatinine of 1.4; electrolytes otherwise normal apart from elevated potassium of 5.4.   Hemoglobin A1c was elevated at 7.2.  TSH was 2.51; CBC was normal.  Cyclic citrullinated peptide antibodies were negative in 2016.  Rheumatoid factor was negative in 2015.  Imaging: Hand x-ray in April 2019 showed moderately severe degenerative changes within the interphalangeal joints carpometacarpal joints and scaphoid trapezium joint.  Foot x-rays in 2016 showed degenerative changes in distal digits with preserved MTPs without erosion; mid feet showed eburnation and joint space narrowing.    I agree with the impression of colleague rheumatologist Dr. Campuzano, and stated in 2016, that clinical picture is most compatible with osteoarthritis, prominently affecting DIPs, PIPs, and first CMC's in the hands as well as the DIPs and mid feet in both feet.  I find little evidence symptomatically or by physical exam for an inflammatory arthropathy.  Recent onset nonpruritic left foot rash has the appearance of eczema/dry skin, I doubt psoriasis.  Significant or prolonged morning stiffness that would herald the presence of chronic inflammatory  arthritis is absent.  Right knee symptoms date to early adulthood, and suggest the possibility of a juvenile inflammatory arthritis with oligoarticular variant.  However, apart from the right knee, no symptoms suggest current or recent inflammatory arthropathy.  I recommend repeating hand and foot films to rule out the presence of erosive or other inflammatory changes in the hands and feet.  Sufficient laboratory work-up for autoimmune or inflammatory arthritis has already been done.    We had a good discussion about the distinction between osteoarthritis and inflammatory/rheumatoid arthritis.  Osteoarthritis is the more common of the 2 forms of joint disease, it has no cure.  Treatment is directed at relieving pain, preserving function, and joint protection.  I recommend that patient undergo comprehensive evaluation in occupational/hand therapy for a treatment program that may include joint protection, splinting, nonpharmacologic measures such as tool or utensil modification, paraffin baths 4-5 times daily.  Selected intermittent corticosteroid injections may also be offered through sports medicine or hand surgery as adjunct of measures.  For the feet, I recommended continued elevation when patient is seated. Weight loss, even moderate, will likely help with foot comfort. I recommended that patient avoid sitting without standing or stretching for more than 30 minutes at a time in order to reduce the gelling phenomenon and pain associated with movement after prolonged immobility.    No immunomodulatory therapy is warranted.  Patient may safely increase acetaminophen use from current dose of 650 mg twice daily to up to 3000 mg total dose (1000 mg 3 times daily) as needed for continuous suppression of joint pain.    I will be in contact with patient regarding results of ordered hand and foot films.  I will be happy to see her again if she or her primary provider request additional consultation about new  "symptoms.    RTC prn    I spent >50% of this 65 minute encounter in reviewing laboratory testing, reviewing x-ray findings, counseling and coordination of care regarding the patient's complex medical problem of osteoarthritis of the hands and feet, diabetes, obesity, spinal stenosis, and eczema.    Leighton Patel MD  Staff Rheumatologist, [facility]           History of Present Illness:   Moria Andre presents for evaluation of joint pain. Referred by Dr. Churchill.    Patient saw Dr. Campuzano, rheumatologist at [facility] in 2016 for back pain.  Impression was of spinal stenosis secondary to degenerative disc disease and osteoarthritis; no evidence of rheumatoid arthritis was noted.    Patient was seen by primary provider on April 22, 2022 for diagnoses of right lower extremity cellulitis, improved; chronic lower extremity edema; major depression; essential hypertension, and stage III chronic kidney disease.  Type 2 diabetes mellitus was also discussed.  Recommendations were to start Jardiance for diabetes, continue antihypertensive therapy.    Today, she notes increasing difficulty with her R > L  feet. For several years, she has noted that she is \"like a hippopotamus\" walking after immobility. Pain is most noticed in the evenings after she eats supper. She elevates the R foot and leg; the sense of swelling/thickness is reduced.  There is forefoot pain in the mornings, but lasting only 5 to 10 minutes and relieved with walking.    The R > L foot feels \"thick\" when she is bed. Some days there is visible swelling in the foot and the toes. r leg aches occasionally.    She reports falling many times in recent years, often when arising from sitting to standing position in her home.    She reports chronic pain in her hands and fingers; multiple fingers do not straighten out. She has received injections in multiple knuckles. The L 3rd finger joint is very swollen. She notes difficulty with grasping bottles, not with " "pens/pencils. She uses lidocaine patches (solanpas) on her hands. There is early morning stiffness in many fingers/knuckels in the morning, relieved with movement after a few minutes. She gets relief from a medicated \"pad\".    There was swelling/pain in her R leg since she struck her shin on an oven door last week. The R leg is more problematic ever since a revision TKR on the R 15 years ago, done due to prosthesis infection.    R hip hurts at night. She has had low back injections every 3-4 months, usually after R hip pain worsens. Dx: spinal stenosis.    She uses acetaminophen 1000 mg bid. She thinks that acetaminophen helps a great deal.  She does not use nonsteroidals or other pain medication regularly.  She has not taken oral prednisone recently.         Review of Systems:       Constitutional: negative  Skin: \"psoriasis\" on her left forefoot for several months.  Eyes: negative  Ears/Nose/Throat: negative  Respiratory: No shortness of breath, dyspnea on exertion, cough, or hemoptysis  Cardiovascular: negative  Gastrointestinal: negative  Genitourinary: negative  Musculoskeletal: negative  Neurologic: negative  Psychiatric: negative  Hematologic/Lymphatic/Immunologic: negative  Endocrine: negative          Past Medical History:     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     Myocardial infarction (H) 9/2007, cath 2013 ml    BMS: stent to OM, diag, nl EF, echo /C angia 2013 , f/u cath no " "lesion >40%     Nephrolithiasis     right side     OA (osteoarthritis) of knee      Obesity      Rheumatoid arthritis flare (H)     prednisone     Sleep apnea     restarted using cpap      TIA (transient ischaemic attack)      Ventral hernia, unspecified, without mention of obstruction or gangrene      Past Surgical History:   Procedure Laterality Date     APPENDECTOMY       BIOPSY BREAST      x2 -needle & lumpectomy-benign     CHOLECYSTECTOMY       CORONARY ANGIOGRAPHY ADULT ORDER  2007    Bare metal stent to OM1, Diagonal patent      CORONARY ANGIOGRAPHY ADULT ORDER  2007    [place] stent to Diagonal     HC LEFT HEART CATHETERIZATION  2013    Moderate CAD     HYSTERECTOMY TOTAL ABDOMINAL       ORTHOPEDIC SURGERY      knee replacement on right side (), Left side ()     RELEASE CARPAL TUNNEL      right and left     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 She had difficulty with straightening/bending the knee. At age 20, she had ?synovectomy on the R.  She had multiple drainages of the knee during adulthood. She was told that she had extensive scarring by surgeon at the time of TKR in the early .     # L TKR at [facility], ? 2002.       Social History:     Social History     Occupational History     Not on file   Tobacco Use     Smoking status: Former Smoker     Packs/day: 0.25     Years: 1.00     Pack years: 0.25     Types: Cigarettes     Start date:      Quit date: 1973     Years since quittin.0     Smokeless tobacco: Never Used   Substance and Sexual Activity     Alcohol use: Yes     Alcohol/week: 0.0 - 1.0 standard drinks     Comment: rarely     Drug use: No     Sexual activity: Not Currently     Partners: Male     , lives alone       Family History:     Family History   Problem Relation Age of Onset     Neurologic Disorder Mother         MS - at 60's     C.A.D. Father         " " at 8o's, ? prostate ca     Breast Cancer No family hx of      Cancer - colorectal No family hx of      Father had \"rheumatism\" in the hands       Allergies:     Allergies   Allergen Reactions     Aspirin Hives     Reaction occurred during childhood.      Lisinopril Cough     Metformin      Elevated lactic acid     Minocycline      Yellow Dye Allergy. Minocycline has Yellow Dye #10.     Salicylates Hives     Yellow Dye Hives     Rxn to yellow tablet. Eyes swelled shut.      Yellow Dyes Hives            Medications:     Current Outpatient Medications   Medication Sig Dispense Refill     acetaminophen (TYLENOL) 500 MG tablet Take 2 tablets (1,000 mg) by mouth 3 times daily       albuterol (PROAIR HFA/PROVENTIL HFA/VENTOLIN HFA) 108 (90 Base) MCG/ACT inhaler INHALE 2 PUFFS INTO THE LUNGS EVERY 6 HOURS FOR SHORTNESS OF BREATH 54 g 1     amLODIPine (NORVASC) 5 MG tablet Take 1 tablet (5 mg) by mouth daily for Blood Pressure 90 tablet 3     atorvastatin (LIPITOR) 20 MG tablet Take 1 tablet (20 mg) by mouth daily for cholestrol 90 tablet 3     blood glucose (TRUE METRIX BLOOD GLUCOSE TEST) test strip 100 strips by In Vitro route daily Use to test blood sugar once times daily or as directed. 100 strip 1     Blood Glucose Calibration (TRUE METRIX LEVEL 1) Low SOLN 1 each daily as needed (to calibrate blood glucose machine) 1 each 3     blood glucose monitoring (NO BRAND SPECIFIED) meter device kit Use to test blood sugar 1-2 times daily or as directed. 1 kit 0     Blood Glucose Monitoring Suppl (TRUE METRIX AIR GLUCOSE METER) w/Device KIT 1 Units See Admin Instructions 1 kit 0     buPROPion (WELLBUTRIN SR) 100 MG 12 hr tablet Take 1 tablet (100 mg) by mouth daily for mood 90 tablet 3     Cholecalciferol (VITAMIN D) 2000 UNITS CAPS Take 2,000 Units by mouth daily        clopidogrel (PLAVIX) 75 MG tablet Take 1 tablet (75 mg) by mouth daily 90 tablet 2     DULoxetine (CYMBALTA) 60 MG capsule TAKE 1 CAPSULE(60 MG) BY MOUTH " DAILY 30 capsule 0     empagliflozin (JARDIANCE) 10 MG TABS tablet Take 1 tablet (10 mg) by mouth daily for diabetes 90 tablet 1     fluticasone (FLONASE) 50 MCG/ACT nasal spray Spray 1 spray into both nostrils daily as needed        fluticasone-salmeterol (ADVAIR) 100-50 MCG/DOSE inhaler Inhale 1 puff into the lungs every 12 hours 60 each 4     fluticasone-salmeterol (ADVAIR) 100-50 MCG/DOSE inhaler INHALE 1 PUFF EVERY 12 HOURS 60 each 3     IBANdronate (BONIVA) 150 MG tablet TAKE 1 TABLET EVERY 30 DAYS FOR OSTEOPENIA 3 tablet 3     Lidocaine (LIDOCARE) 4 % Patch Place 1 patch onto the skin every 24 hours To prevent lidocaine toxicity, patient should be patch free for 12 hrs daily.       losartan (COZAAR) 50 MG tablet Take 1 tablet (50 mg) by mouth daily for Blood Pressure 90 tablet 1     Menthol, Topical Analgesic, (ICY HOT EX) Apply to affected area every morning       metoprolol succinate ER (TOPROL-XL) 100 MG 24 hr tablet Take 1 tablet (100 mg) by mouth daily 90 tablet 2     nitroGLYcerin (NITROSTAT) 0.4 MG sublingual tablet For chest pain place 1 tablet under the tongue every 5 minutes for 3 doses. If symptoms persist 5 minutes after 1st dose call 911. 25 tablet 0     order for DME Equipment being ordered: diabetic shoes 1 each 0     order for DME DREAMSTATION  5-15 CM/H20  NAS[employer]        oxyCODONE (ROXICODONE) 5 MG tablet Take 1 tablet (5 mg) by mouth daily as needed for pain 20 tablet 0     pantoprazole (PROTONIX) 20 MG EC tablet Take 1 tablet (20 mg) by mouth daily 90 tablet 1     polyethylene glycol (MIRALAX) 17 g packet Take 17 g by mouth daily       triamcinolone (KENALOG) 0.1 % external cream Apply topically 2 times daily 85 g 1     TRUEplus Lancets 33G MISC 1 each 2 times daily 100 each 0            Physical Exam:   Blood pressure (!) 148/77, pulse 84, weight 127.3 kg (280 lb 9.6 oz), SpO2 97 %, not currently breastfeeding.  Wt Readings from Last 6 Encounters:   04/29/22 127.3 kg (280 lb 9.6  oz)   04/26/22 127.5 kg (281 lb)   04/11/22 129.3 kg (285 lb)   03/03/22 125.2 kg (276 lb)   02/24/22 124.7 kg (275 lb)   02/16/22 122.5 kg (270 lb)       Constitutional: severe obesity, appearing stated age; cooperative  Eyes: nl EOM, PERRLA, vision, conjunctiva, sclera  ENT: nl external ears, nose, hearing, lips, teeth, gums, throat  No mucous membrane lesions, normal saliva pool  Neck: no mass or thyroid enlargement  Resp: lungs clear to auscultation, nl to palpation  CV: RRR, no murmurs, rubs or gallops, no edema  GI: no ABD mass or tenderness, no HSM  : not tested  Lymph: no cervical, supraclavicular, inguinal or epitrochlear nodes  MS: L 3rd PIP swollen, tender; angulation at multiple DIPs (2, 3 fingers on L); squaring changes present at the base of both thumbs with hyperextension of the thumb MP and subluxation at first CMC's..  No synovial thickening.  Fist formation impaired due to decreased PIP and DIP range of motion, but  strength excellent.  Wrists, shoulders, elbows, ankles show preserved range of motion and no inflammatory changes.  Well-healed depressed surgical scar over the anterior right knee.  Crepitus with flexion and extension of the right knee, but no tenderness, effusion, redness, or warmth.  Skin: red patches with scale over L anterior ankle, midfoot. Central erythema with edge-based scaling. Not raissed or excoriated.  Neuro: nl cranial nerves, strength,DTRs.   Psych: nl judgement, orientation, memory, affect.         Data:     @  RHEUM RESULTS Latest Ref Rng & Units 11/3/2005 11/3/2005 11/4/2005   ALBUMIN 3.4 - 5.0 g/dL - - -   ALT 0 - 50 U/L - - -   AST 0 - 45 U/L - - -   CK TOTAL 30 - 225 U/L - - -   CREATININE 0.52 - 1.04 mg/dL 1.70(H) 1.45(H) 1.20   CRP 0.0 - 8.0 mg/L - - -   GFR ESTIMATE, IF BLACK >60 mL/min/[1.73:m2] 38(L) 46(L) 57(L)   GFR ESTIMATE >60 mL/min/1.73m2 31(L) 38(L) 47(L)   HEMATOCRIT 35.0 - 47.0 % - - -   HEMOGLOBIN 11.7 - 15.7 g/dL 10.6(L) - 9.4(L)   WBC 4.0 -  11.0 10e3/uL - - -   RBC 3.80 - 5.20 10e6/uL - - -   RDW 10.0 - 15.0 % - - -   MCHC 31.5 - 36.5 g/dL - - -   MCV 78 - 100 fL - - -   PLATELET COUNT 150 - 450 10e3/uL 211 - -   RHEUMATOID FACTOR <20 IU/mL - - -   ESR 0 - 30 mm/h - - -       Rheumatoid Factor   Date Value Ref Range Status   11/10/2015 <20 <20 IU/mL Final   ,  ,   Cyclic Cit Pept IgG/IgA   Date Value Ref Range Status   01/15/2016 <20  Interpretation:  Negative   <20 UNITS Final

## 2022-04-27 DIAGNOSIS — E87.5 HYPERKALEMIA: Primary | ICD-10-CM

## 2022-04-27 DIAGNOSIS — N18.32 STAGE 3B CHRONIC KIDNEY DISEASE (H): ICD-10-CM

## 2022-04-27 LAB
ALBUMIN SERPL-MCNC: 3.9 G/DL (ref 3.4–5)
ANION GAP SERPL CALCULATED.3IONS-SCNC: 4 MMOL/L (ref 3–14)
BUN SERPL-MCNC: 28 MG/DL (ref 7–30)
CALCIUM SERPL-MCNC: 9.2 MG/DL (ref 8.5–10.1)
CHLORIDE BLD-SCNC: 106 MMOL/L (ref 94–109)
CO2 SERPL-SCNC: 28 MMOL/L (ref 20–32)
CREAT SERPL-MCNC: 1.4 MG/DL (ref 0.52–1.04)
FERRITIN SERPL-MCNC: 108 NG/ML (ref 8–252)
GFR SERPL CREATININE-BSD FRML MDRD: 36 ML/MIN/1.73M2
GLUCOSE BLD-MCNC: 78 MG/DL (ref 70–99)
PHOSPHATE SERPL-MCNC: 4.1 MG/DL (ref 2.5–4.5)
POTASSIUM BLD-SCNC: 5.4 MMOL/L (ref 3.4–5.3)
SODIUM SERPL-SCNC: 138 MMOL/L (ref 133–144)

## 2022-04-27 NOTE — RESULT ENCOUNTER NOTE
Please notify patient that her potassium is elevated   This important to get recheck in one week  Make  lab only appointment ( no fasting is needed )  If potassium stays elevated then will have to take her off of losartan.  Stay well hydrated   But continue for the time being   Ferritin which is iron stores in the body is normal.

## 2022-04-29 ENCOUNTER — ANCILLARY PROCEDURE (OUTPATIENT)
Dept: GENERAL RADIOLOGY | Facility: CLINIC | Age: 87
End: 2022-04-29
Attending: INTERNAL MEDICINE
Payer: COMMERCIAL

## 2022-04-29 ENCOUNTER — OFFICE VISIT (OUTPATIENT)
Dept: RHEUMATOLOGY | Facility: CLINIC | Age: 87
End: 2022-04-29
Attending: INTERNAL MEDICINE
Payer: COMMERCIAL

## 2022-04-29 VITALS
OXYGEN SATURATION: 97 % | BODY MASS INDEX: 46.69 KG/M2 | DIASTOLIC BLOOD PRESSURE: 77 MMHG | SYSTOLIC BLOOD PRESSURE: 148 MMHG | HEART RATE: 84 BPM | WEIGHT: 280.6 LBS

## 2022-04-29 DIAGNOSIS — M19.042 PRIMARY OSTEOARTHRITIS OF BOTH HANDS: Primary | ICD-10-CM

## 2022-04-29 DIAGNOSIS — M19.041 PRIMARY OSTEOARTHRITIS OF BOTH HANDS: Primary | ICD-10-CM

## 2022-04-29 DIAGNOSIS — M19.042 PRIMARY OSTEOARTHRITIS OF BOTH HANDS: ICD-10-CM

## 2022-04-29 DIAGNOSIS — M19.041 PRIMARY OSTEOARTHRITIS OF BOTH HANDS: ICD-10-CM

## 2022-04-29 PROCEDURE — 99205 OFFICE O/P NEW HI 60 MIN: CPT | Performed by: INTERNAL MEDICINE

## 2022-04-29 PROCEDURE — 73120 X-RAY EXAM OF HAND: CPT | Mod: 52 | Performed by: RADIOLOGY

## 2022-04-29 ASSESSMENT — PAIN SCALES - GENERAL: PAINLEVEL: NO PAIN (0)

## 2022-04-29 NOTE — PATIENT INSTRUCTIONS
Diagnosis:  1.  Osteoarthritis hands and toes and mid feet: Noninflammatory cartilage loss and degenerative changes account for the pain in your hands and feet.  X-rays and blood work do not suggest the presence of an inflammatory (rheumatoid) process that would be treated with immune modulating medication.  Treatment emphasis should be on improving function and muscle strength, modifying tools and utensils, and use of low risk pain medication and splints.  2.  Left foot rash is most likely eczema, not psoriasis.    Plan:  1.  Continue use of acetaminophen 1000 mg twice daily for joint pain.  3000 mg of acetaminophen may be safely used daily every day.  2.  Referral to occupational/hand therapy for evaluation of splints, hand exercises, nonpharmacologic measures to improve comfort and function of the hands.  3.  Make a trial of a paraffin bath up to 4-5 times daily as needed for finger and hand pain.  4.  Check hand and foot x-rays today to determine whether inflammatory changes are present.  5.  See dermatology if continued application of topical cream does not result in resolution of left foot rash.

## 2022-04-29 NOTE — LETTER
4/29/2022       RE: Moira Andre  [address]  [facility] [address]     Dear Colleague,    Thank you for referring your patient, Moira Andre, to the [facility] [place] at [facility]. Please see a copy of my visit note below.      Rheumatology Clinic Visit  [facility]  Leighton NUHANila Patel M.D.     Moira Andre MRN# [id number]   YOB: 1933 Age: 88 year old     Date of Visit: 04/29/2022  Primary care provider: Maryan Churchill          Assessment and Plan:   #Chronic, progressive arthralgia involving hands and feet  # AODM  # Valvular heart disease  # Probable eczema, L foot  # Spinal stenosis  # Osteoarthritis, knees, s/p TKR bilateral    Principal symptom is worsening joint pain affecting multiple finger joints and hands as well as mid feet and toes over many months to years.  Physical exam today reveals abundant changes of angulation, bony enlargement in DIPs and PIPs, as well as first MCPs in the hands.  Feet show no synovitis or inflammatory changes.  Laboratory evaluation on April 26, 2022 showed creatinine of 1.4; electrolytes otherwise normal apart from elevated potassium of 5.4.   Hemoglobin A1c was elevated at 7.2.  TSH was 2.51; CBC was normal.  Cyclic citrullinated peptide antibodies were negative in 2016.  Rheumatoid factor was negative in 2015.  Imaging: Hand x-ray in April 2019 showed moderately severe degenerative changes within the interphalangeal joints carpometacarpal joints and scaphoid trapezium joint.  Foot x-rays in 2016 showed degenerative changes in distal digits with preserved MTPs without erosion; mid feet showed eburnation and joint space narrowing.    I agree with the impression of colleague rheumatologist Dr. Campuzano, and stated in 2016, that clinical picture is most compatible with osteoarthritis, prominently affecting DIPs, PIPs, and first CMC's in the hands as well as  the DIPs and mid feet in both feet.  I find little evidence symptomatically or by physical exam for an inflammatory arthropathy.  Recent onset nonpruritic left foot rash has the appearance of eczema/dry skin, I doubt psoriasis.  Significant or prolonged morning stiffness that would herald the presence of chronic inflammatory arthritis is absent.  Right knee symptoms date to early adulthood, and suggest the possibility of a juvenile inflammatory arthritis with oligoarticular variant.  However, apart from the right knee, no symptoms suggest current or recent inflammatory arthropathy.  I recommend repeating hand and foot films to rule out the presence of erosive or other inflammatory changes in the hands and feet.  Sufficient laboratory work-up for autoimmune or inflammatory arthritis has already been done.    We had a good discussion about the distinction between osteoarthritis and inflammatory/rheumatoid arthritis.  Osteoarthritis is the more common of the 2 forms of joint disease, it has no cure.  Treatment is directed at relieving pain, preserving function, and joint protection.  I recommend that patient undergo comprehensive evaluation in occupational/hand therapy for a treatment program that may include joint protection, splinting, nonpharmacologic measures such as tool or utensil modification, paraffin baths 4-5 times daily.  Selected intermittent corticosteroid injections may also be offered through sports medicine or hand surgery as adjunct of measures.  For the feet, I recommended continued elevation when patient is seated. Weight loss, even moderate, will likely help with foot comfort. I recommended that patient avoid sitting without standing or stretching for more than 30 minutes at a time in order to reduce the gelling phenomenon and pain associated with movement after prolonged immobility.    No immunomodulatory therapy is warranted.  Patient may safely increase acetaminophen use from current dose of 650  "mg twice daily to up to 3000 mg total dose (1000 mg 3 times daily) as needed for continuous suppression of joint pain.    I will be in contact with patient regarding results of ordered hand and foot films.  I will be happy to see her again if she or her primary provider request additional consultation about new symptoms.    RTC prn    I spent >50% of this 65 minute encounter in reviewing laboratory testing, reviewing x-ray findings, counseling and coordination of care regarding the patient's complex medical problem of osteoarthritis of the hands and feet, diabetes, obesity, spinal stenosis, and eczema.    Leighton Patel MD  Staff Rheumatologist, [facility]           History of Present Illness:   Moira Andre presents for evaluation of joint pain. Referred by Dr. Churchill.    Patient saw Dr. Campuzano, rheumatologist at [facility] in 2016 for back pain.  Impression was of spinal stenosis secondary to degenerative disc disease and osteoarthritis; no evidence of rheumatoid arthritis was noted.    Patient was seen by primary provider on April 22, 2022 for diagnoses of right lower extremity cellulitis, improved; chronic lower extremity edema; major depression; essential hypertension, and stage III chronic kidney disease.  Type 2 diabetes mellitus was also discussed.  Recommendations were to start Jardiance for diabetes, continue antihypertensive therapy.    Today, she notes increasing difficulty with her R > L  feet. For several years, she has noted that she is \"like a hippopotamus\" walking after immobility. Pain is most noticed in the evenings after she eats supper. She elevates the R foot and leg; the sense of swelling/thickness is reduced.  There is forefoot pain in the mornings, but lasting only 5 to 10 minutes and relieved with walking.    The R > L foot feels \"thick\" when she is bed. Some days there is visible swelling in the foot and the toes. r leg aches occasionally.    She reports falling many times in recent " "years, often when arising from sitting to standing position in her home.    She reports chronic pain in her hands and fingers; multiple fingers do not straighten out. She has received injections in multiple knuckles. The L 3rd finger joint is very swollen. She notes difficulty with grasping bottles, not with pens/pencils. She uses lidocaine patches (solanpas) on her hands. There is early morning stiffness in many fingers/knuckels in the morning, relieved with movement after a few minutes. She gets relief from a medicated \"pad\".    There was swelling/pain in her R leg since she struck her shin on an oven door last week. The R leg is more problematic ever since a revision TKR on the R 15 years ago, done due to prosthesis infection.    R hip hurts at night. She has had low back injections every 3-4 months, usually after R hip pain worsens. Dx: spinal stenosis.    She uses acetaminophen 1000 mg bid. She thinks that acetaminophen helps a great deal.  She does not use nonsteroidals or other pain medication regularly.  She has not taken oral prednisone recently.         Review of Systems:       Constitutional: negative  Skin: \"psoriasis\" on her left forefoot for several months.  Eyes: negative  Ears/Nose/Throat: negative  Respiratory: No shortness of breath, dyspnea on exertion, cough, or hemoptysis  Cardiovascular: negative  Gastrointestinal: negative  Genitourinary: negative  Musculoskeletal: negative  Neurologic: negative  Psychiatric: negative  Hematologic/Lymphatic/Immunologic: negative  Endocrine: negative          Past Medical History:     Past Medical History:   Diagnosis Date     Aortic valve sclerosis     heart murmur, no AS     Arrhythmia     PAT, PVC     Aspirin allergy     Plavix use long term     Asthma      CKD (chronic kidney disease) stage 3, GFR 30-59 ml/min (H)     x 2007 atleast     Congestive heart failure, unspecified      Depression      Diabetes mellitus (H) 2010     Diastolic dysfunction, left " "ventricle     grade 2, nl ef     HTN (hypertension)      Lactic acidosis 2018    due to dehydration and metformin     Migraine headache      Mitral stenosis     mild, 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     Myocardial infarction (H) 2007, cath  ml    BMS: stent to OM, diag, nl EF, echo /C angia  , f/u cath no lesion >40%     Nephrolithiasis     right side     OA (osteoarthritis) of knee      Obesity      Rheumatoid arthritis flare (H)     prednisone     Sleep apnea     restarted using cpap      TIA (transient ischaemic attack)      Ventral hernia, unspecified, without mention of obstruction or gangrene      Past Surgical History:   Procedure Laterality Date     APPENDECTOMY       BIOPSY BREAST      x2 -needle & lumpectomy-benign     CHOLECYSTECTOMY       CORONARY ANGIOGRAPHY ADULT ORDER  2007    Bare metal stent to OM1, Diagonal patent      CORONARY ANGIOGRAPHY ADULT ORDER  2007    [facility] stent to Diagonal     HC LEFT HEART CATHETERIZATION  2013    Moderate CAD     HYSTERECTOMY TOTAL ABDOMINAL       ORTHOPEDIC SURGERY      knee replacement on right side (), Left side ()     RELEASE CARPAL TUNNEL      right and left     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 She had difficulty with straightening/bending the knee. At age 20, she had ?synovectomy on the R.  She had multiple drainages of the knee during adulthood. She was told that she had extensive scarring by surgeon at the time of TKR in the early .     # L TKR at [facility], ? 2002.       Social History:     Social History     Occupational History     Not on file   Tobacco Use     Smoking status: Former Smoker     Packs/day: 0.25     Years: 1.00     Pack years: 0.25     Types: Cigarettes     Start date:      Quit date: 1973     Years since quittin.0     Smokeless tobacco: Never Used   Substance and Sexual Activity     Alcohol use: Yes     " "Alcohol/week: 0.0 - 1.0 standard drinks     Comment: rarely     Drug use: No     Sexual activity: Not Currently     Partners: Male     , lives alone       Family History:     Family History   Problem Relation Age of Onset     Neurologic Disorder Mother         MS - at 60's     C.A.D. Father          at 8o's, ? prostate ca     Breast Cancer No family hx of      Cancer - colorectal No family hx of      Father had \"rheumatism\" in the hands       Allergies:     Allergies   Allergen Reactions     Aspirin Hives     Reaction occurred during childhood.      Lisinopril Cough     Metformin      Elevated lactic acid     Minocycline      Yellow Dye Allergy. Minocycline has Yellow Dye #10.     Salicylates Hives     Yellow Dye Hives     Rxn to yellow tablet. Eyes swelled shut.      Yellow Dyes Hives            Medications:     Current Outpatient Medications   Medication Sig Dispense Refill     acetaminophen (TYLENOL) 500 MG tablet Take 2 tablets (1,000 mg) by mouth 3 times daily       albuterol (PROAIR HFA/PROVENTIL HFA/VENTOLIN HFA) 108 (90 Base) MCG/ACT inhaler INHALE 2 PUFFS INTO THE LUNGS EVERY 6 HOURS FOR SHORTNESS OF BREATH 54 g 1     amLODIPine (NORVASC) 5 MG tablet Take 1 tablet (5 mg) by mouth daily for Blood Pressure 90 tablet 3     atorvastatin (LIPITOR) 20 MG tablet Take 1 tablet (20 mg) by mouth daily for cholestrol 90 tablet 3     blood glucose (TRUE METRIX BLOOD GLUCOSE TEST) test strip 100 strips by In Vitro route daily Use to test blood sugar once times daily or as directed. 100 strip 1     Blood Glucose Calibration (TRUE METRIX LEVEL 1) Low SOLN 1 each daily as needed (to calibrate blood glucose machine) 1 each 3     blood glucose monitoring (NO BRAND SPECIFIED) meter device kit Use to test blood sugar 1-2 times daily or as directed. 1 kit 0     Blood Glucose Monitoring Suppl (TRUE METRIX AIR GLUCOSE METER) w/Device KIT 1 Units See Admin Instructions 1 kit 0     buPROPion (WELLBUTRIN SR) 100 MG " 12 hr tablet Take 1 tablet (100 mg) by mouth daily for mood 90 tablet 3     Cholecalciferol (VITAMIN D) 2000 UNITS CAPS Take 2,000 Units by mouth daily        clopidogrel (PLAVIX) 75 MG tablet Take 1 tablet (75 mg) by mouth daily 90 tablet 2     DULoxetine (CYMBALTA) 60 MG capsule TAKE 1 CAPSULE(60 MG) BY MOUTH DAILY 30 capsule 0     empagliflozin (JARDIANCE) 10 MG TABS tablet Take 1 tablet (10 mg) by mouth daily for diabetes 90 tablet 1     fluticasone (FLONASE) 50 MCG/ACT nasal spray Spray 1 spray into both nostrils daily as needed        fluticasone-salmeterol (ADVAIR) 100-50 MCG/DOSE inhaler Inhale 1 puff into the lungs every 12 hours 60 each 4     fluticasone-salmeterol (ADVAIR) 100-50 MCG/DOSE inhaler INHALE 1 PUFF EVERY 12 HOURS 60 each 3     IBANdronate (BONIVA) 150 MG tablet TAKE 1 TABLET EVERY 30 DAYS FOR OSTEOPENIA 3 tablet 3     Lidocaine (LIDOCARE) 4 % Patch Place 1 patch onto the skin every 24 hours To prevent lidocaine toxicity, patient should be patch free for 12 hrs daily.       losartan (COZAAR) 50 MG tablet Take 1 tablet (50 mg) by mouth daily for Blood Pressure 90 tablet 1     Menthol, Topical Analgesic, (ICY HOT EX) Apply to affected area every morning       metoprolol succinate ER (TOPROL-XL) 100 MG 24 hr tablet Take 1 tablet (100 mg) by mouth daily 90 tablet 2     nitroGLYcerin (NITROSTAT) 0.4 MG sublingual tablet For chest pain place 1 tablet under the tongue every 5 minutes for 3 doses. If symptoms persist 5 minutes after 1st dose call 911. 25 tablet 0     order for DME Equipment being ordered: diabetic shoes 1 each 0     order for DME DREAMSTATION  5-15 CM/H20  NASAL WISP [employer]IC        oxyCODONE (ROXICODONE) 5 MG tablet Take 1 tablet (5 mg) by mouth daily as needed for pain 20 tablet 0     pantoprazole (PROTONIX) 20 MG EC tablet Take 1 tablet (20 mg) by mouth daily 90 tablet 1     polyethylene glycol (MIRALAX) 17 g packet Take 17 g by mouth daily       triamcinolone (KENALOG) 0.1 % external  cream Apply topically 2 times daily 85 g 1     TRUEplus Lancets 33G MISC 1 each 2 times daily 100 each 0            Physical Exam:   Blood pressure (!) 148/77, pulse 84, weight 127.3 kg (280 lb 9.6 oz), SpO2 97 %, not currently breastfeeding.  Wt Readings from Last 6 Encounters:   04/29/22 127.3 kg (280 lb 9.6 oz)   04/26/22 127.5 kg (281 lb)   04/11/22 129.3 kg (285 lb)   03/03/22 125.2 kg (276 lb)   02/24/22 124.7 kg (275 lb)   02/16/22 122.5 kg (270 lb)       Constitutional: severe obesity, appearing stated age; cooperative  Eyes: nl EOM, PERRLA, vision, conjunctiva, sclera  ENT: nl external ears, nose, hearing, lips, teeth, gums, throat  No mucous membrane lesions, normal saliva pool  Neck: no mass or thyroid enlargement  Resp: lungs clear to auscultation, nl to palpation  CV: RRR, no murmurs, rubs or gallops, no edema  GI: no ABD mass or tenderness, no HSM  : not tested  Lymph: no cervical, supraclavicular, inguinal or epitrochlear nodes  MS: L 3rd PIP swollen, tender; angulation at multiple DIPs (2, 3 fingers on L); squaring changes present at the base of both thumbs with hyperextension of the thumb MP and subluxation at first CMC's..  No synovial thickening.  Fist formation impaired due to decreased PIP and DIP range of motion, but  strength excellent.  Wrists, shoulders, elbows, ankles show preserved range of motion and no inflammatory changes.  Well-healed depressed surgical scar over the anterior right knee.  Crepitus with flexion and extension of the right knee, but no tenderness, effusion, redness, or warmth.  Skin: red patches with scale over L anterior ankle, midfoot. Central erythema with edge-based scaling. Not raissed or excoriated.  Neuro: nl cranial nerves, strength,DTRs.   Psych: nl judgement, orientation, memory, affect.         Data:     @  RHEUM RESULTS Latest Ref Rng & Units 11/3/2005 11/3/2005 11/4/2005   ALBUMIN 3.4 - 5.0 g/dL - - -   ALT 0 - 50 U/L - - -   AST 0 - 45 U/L - - -   CK  TOTAL 30 - 225 U/L - - -   CREATININE 0.52 - 1.04 mg/dL 1.70(H) 1.45(H) 1.20   CRP 0.0 - 8.0 mg/L - - -   GFR ESTIMATE, IF BLACK >60 mL/min/[1.73:m2] 38(L) 46(L) 57(L)   GFR ESTIMATE >60 mL/min/1.73m2 31(L) 38(L) 47(L)   HEMATOCRIT 35.0 - 47.0 % - - -   HEMOGLOBIN 11.7 - 15.7 g/dL 10.6(L) - 9.4(L)   WBC 4.0 - 11.0 10e3/uL - - -   RBC 3.80 - 5.20 10e6/uL - - -   RDW 10.0 - 15.0 % - - -   MCHC 31.5 - 36.5 g/dL - - -   MCV 78 - 100 fL - - -   PLATELET COUNT 150 - 450 10e3/uL 211 - -   RHEUMATOID FACTOR <20 IU/mL - - -   ESR 0 - 30 mm/h - - -       Rheumatoid Factor   Date Value Ref Range Status   11/10/2015 <20 <20 IU/mL Final   ,  ,   Cyclic Cit Pept IgG/IgA   Date Value Ref Range Status   01/15/2016 <20  Interpretation:  Negative   <20 UNITS Final         Again, thank you for allowing me to participate in the care of your patient.      Sincerely,    Leighton Patel MD

## 2022-05-02 ENCOUNTER — TELEPHONE (OUTPATIENT)
Dept: FAMILY MEDICINE | Facility: CLINIC | Age: 87
End: 2022-05-02
Payer: COMMERCIAL

## 2022-05-02 DIAGNOSIS — E11.21 TYPE 2 DIABETES MELLITUS WITH DIABETIC NEPHROPATHY, WITHOUT LONG-TERM CURRENT USE OF INSULIN (H): ICD-10-CM

## 2022-05-02 RX ORDER — GLIMEPIRIDE 2 MG/1
2 TABLET ORAL
Qty: 30 TABLET | Refills: 0 | Status: SHIPPED | OUTPATIENT
Start: 2022-05-02 | End: 2022-06-07

## 2022-05-02 NOTE — TELEPHONE ENCOUNTER
Pt calling and the jardiance is over $500.00 she would like to go back to glimepiride. If approved please send rx to [facility]- [address] [place]. Any questions pt can be reached at [phone]. Thank you.  Aixa De León,

## 2022-05-03 NOTE — TELEPHONE ENCOUNTER
Please advise to schedule follow-up visit/telephone or virtual visit with PCP.  Please advise glimepiride  is not usually recommended in elderly patients due to increased risk for severe prolonged hypoglycemia [low blood sugar] and increased risk of cardiovascular mortality has been reported, as well evidence that risk reduction of macrovascular disease has not been reported, there is risk of  severe and potentially fatal hypoglycemia [low blood sugar] may occur especially increased risk in elderly; and  especially if decrease caloric intake , also increase risk of hypoglycemia in patients with renal [kidney] impairment.  I would approve fill only 1 month of glimepiride 2 mg once daily.  She is to follow-up with her PCP for further refills and discussion, would advise that she sees also diabetic educator to discuss alternatives to glimepiride, if she agrees we will place a referral.    Dr. Thompson- covering provider

## 2022-05-04 ENCOUNTER — LAB (OUTPATIENT)
Dept: LAB | Facility: CLINIC | Age: 87
End: 2022-05-04
Payer: COMMERCIAL

## 2022-05-04 DIAGNOSIS — E87.5 HYPERKALEMIA: ICD-10-CM

## 2022-05-04 PROCEDURE — 36415 COLL VENOUS BLD VENIPUNCTURE: CPT

## 2022-05-04 PROCEDURE — 80048 BASIC METABOLIC PNL TOTAL CA: CPT

## 2022-05-04 NOTE — LETTER
[place] [address] [facility]  [address]  [place]  [address]  Tel: [phone]    May 6, 2022    Moira Andre  [address] [facility] [address]  [place] [address]        Dear Ms. Andre,    This is to inform you regarding your test result.     I spoke to you on phone but sending you a result note also.   Your potassium is normal   Your creatinine has gone up   Your GFR has been is staying on the low side   I recommend that you should see a kidney specialist call nephrologist   I have placed a referral   Please call the following number to make the appointment:   [phone]   It appears that you are dehydrated   Increase your fluid intake   Continue to avoid bananas and high potassium containing food   Keep your follow-up appointment as a scheduled with me in June   Will repeat your lab work at that time       Sincerely,       Dr.Nasima Kerline MD,FACP/SML        Enclosure: Lab Results  Results for orders placed or performed in visit on 05/04/22   Basic metabolic panel  (Ca, Cl, CO2, Creat, Gluc, K, Na, BUN)     Status: Abnormal   Result Value Ref Range    Sodium 139 133 - 144 mmol/L    Potassium 4.7 3.4 - 5.3 mmol/L    Chloride 109 94 - 109 mmol/L    Carbon Dioxide (CO2) 24 20 - 32 mmol/L    Anion Gap 6 3 - 14 mmol/L    Urea Nitrogen 34 (H) 7 - 30 mg/dL    Creatinine 1.45 (H) 0.52 - 1.04 mg/dL    Calcium 9.2 8.5 - 10.1 mg/dL    Glucose 186 (H) 70 - 99 mg/dL    GFR Estimate 35 (L) >60 mL/min/1.73m2

## 2022-05-04 NOTE — TELEPHONE ENCOUNTER
Patient in clinic for lab draw today. Patient requested to speak with RN to follow up on call below.     Writer reviewed provider message below, patient expressed verbal understanding and will schedule VV with PCP at  today to further discuss medication management.    Meaghan Gongora RN  [facility]

## 2022-05-05 LAB
ANION GAP SERPL CALCULATED.3IONS-SCNC: 6 MMOL/L (ref 3–14)
BUN SERPL-MCNC: 34 MG/DL (ref 7–30)
CALCIUM SERPL-MCNC: 9.2 MG/DL (ref 8.5–10.1)
CHLORIDE BLD-SCNC: 109 MMOL/L (ref 94–109)
CO2 SERPL-SCNC: 24 MMOL/L (ref 20–32)
CREAT SERPL-MCNC: 1.45 MG/DL (ref 0.52–1.04)
GFR SERPL CREATININE-BSD FRML MDRD: 35 ML/MIN/1.73M2
GLUCOSE BLD-MCNC: 186 MG/DL (ref 70–99)
POTASSIUM BLD-SCNC: 4.7 MMOL/L (ref 3.4–5.3)
SODIUM SERPL-SCNC: 139 MMOL/L (ref 133–144)

## 2022-05-06 NOTE — RESULT ENCOUNTER NOTE
Please notify patient by sending following letter with copy of test results      Mert Pizarro,    This is to inform you regarding your test result.    I spoke to you on phone but sending you a result note also.  Your potassium is normal  Your creatinine has gone up  Your GFR has been is staying on the low side  I recommend that you should see a kidney specialist call nephrologist  I have placed a referral  Please call the following number to make the appointment:  [phone]  It appears that you are dehydrated  Increase your fluid intake  Continue to avoid bananas and high potassium containing food  Keep your follow-up appointment as a scheduled with me in June  Will repeat your lab work at that time      Sincerely,      Dr.Nasima Kerline MD,FACP

## 2022-05-11 ENCOUNTER — OFFICE VISIT (OUTPATIENT)
Dept: URGENT CARE | Facility: URGENT CARE | Age: 87
End: 2022-05-11
Attending: PHYSICIAN ASSISTANT
Payer: COMMERCIAL

## 2022-05-11 VITALS
HEART RATE: 69 BPM | DIASTOLIC BLOOD PRESSURE: 85 MMHG | TEMPERATURE: 98.2 F | RESPIRATION RATE: 21 BRPM | SYSTOLIC BLOOD PRESSURE: 153 MMHG | OXYGEN SATURATION: 95 %

## 2022-05-11 DIAGNOSIS — R06.02 SOB (SHORTNESS OF BREATH): ICD-10-CM

## 2022-05-11 DIAGNOSIS — R07.0 THROAT PAIN: Primary | ICD-10-CM

## 2022-05-11 LAB
DEPRECATED S PYO AG THROAT QL EIA: NEGATIVE
GROUP A STREP BY PCR: NOT DETECTED

## 2022-05-11 PROCEDURE — 99214 OFFICE O/P EST MOD 30 MIN: CPT | Mod: CS | Performed by: PHYSICIAN ASSISTANT

## 2022-05-11 PROCEDURE — 87651 STREP A DNA AMP PROBE: CPT | Performed by: PHYSICIAN ASSISTANT

## 2022-05-11 PROCEDURE — U0003 INFECTIOUS AGENT DETECTION BY NUCLEIC ACID (DNA OR RNA); SEVERE ACUTE RESPIRATORY SYNDROME CORONAVIRUS 2 (SARS-COV-2) (CORONAVIRUS DISEASE [COVID-19]), AMPLIFIED PROBE TECHNIQUE, MAKING USE OF HIGH THROUGHPUT TECHNOLOGIES AS DESCRIBED BY CMS-2020-01-R: HCPCS | Performed by: PHYSICIAN ASSISTANT

## 2022-05-11 PROCEDURE — U0005 INFEC AGEN DETEC AMPLI PROBE: HCPCS | Performed by: PHYSICIAN ASSISTANT

## 2022-05-11 NOTE — PATIENT INSTRUCTIONS
Albuterol 1-2 puffs every 4 hours  Follow up immediately with chest pain  If covid is positive, follow up to discuss treatment options        Follow up immediately with severe headache, chest pain, or shortness of breath    Rest, isolate for results, hydrate, follow up if worsening or new symptoms  Unvaccinated household members to isolate until test results, if positive isolate for 2 weeks and follow up for testing if symptoms occur         Patient Education     Coronavirus Disease 2019 (COVID-19): Caring for Yourself or Others   If you or a household member have symptoms of COVID-19, follow the guidelines below. This will help you manage symptoms and keep the virus from spreading.  If you have symptoms of COVID-19  Stay home and contact your care team. They will tell you what to do.  Don t panic. Keep in mind that other illnesses can cause similar symptoms.  Stay away from work, school, and public places.  Limit physical contact with others in your home. Limit visitors. No kissing.  Clean surfaces you touch with disinfectant.  If you need to cough or sneeze, do it into a tissue. Then throw the tissue into the trash. If you don't have tissues, cough or sneeze into the bend of your elbow.  Don t share food or personal items with people in your household. This includes items like eating and drinking utensils, towels, and bedding.  Wear a cloth face mask around other people. During a public health emergency, medical face masks may be reserved for healthcare workers. You may need to make a cloth face mask of your own. You can do this using a bandana, T-shirt, or other cloth. The CDC has instructions on how to make a face mask. Wear the mask so that it covers both your nose and mouth.  If you need to go to a hospital or clinic, call ahead to let them know. Expect the care team to wear masks, gowns, gloves, and eye protection. You may need to come to a different entrance or wait in a separate room.  Follow all  instructions from your care team.    If you ve been diagnosed with COVID-19  Stay home and away from others, including other people in your home. (This is called self-isolation.) Don t leave home unless you need to get medical care. Don t go to work, school, or public places. Don t use buses, taxis, or other public transportation.  Follow all instructions from your care team.  If you need to go to a hospital or clinic, call ahead to let them know. Expect the care team to wear masks, gowns, gloves, and eye protection. You may need to come to a different entrance or wait in a separate room.  Wear a face mask over your nose and mouth. This is to protect others from your germs. If you can t wear a mask, your caregivers should wear one. You may need to make your own mask using a bandana, T-shirt, or other cloth. See the CDC s instructions on how to make a face mask.  Have no contact with pets and other animals.  Don t share food or personal items with people in your household. This includes items like eating and drinking utensils, towels, and bedding.  If you need to cough or sneeze, do it into a tissue. Then throw the tissue into the trash. If you don't have tissues, cough or sneeze into the bend of your elbow.  Wash your hands often.    Self-care at home   At this time, there is no medicine approved to prevent or treat COVID-19. The FDA has approved an antiviral medicine (called remdesivir) for people being treated in the hospital. This is for people 12 years and older who weigh more than about 88 pounds (40 kgs). In certain cases, it may also be used for people younger than 12 years or who weigh less than about 88 pounds (40 kgs)..  Currently, treatment is mostly aimed at helping your body while it fights the virus.  Getting extra rest.  Drink extra fluids 6 to 8 glasses of liquids each day), unless a doctor has told you not to. Ask your care team which fluids are best for you. Avoid fluids that contain caffeine or  alcohol.  Taking over-the-counter (OTC) medicine to reduce pain and fever. Your care team will tell you which medicine to use.  If you ve been in the hospital for COVID-19, follow your care team s instructions. They will tell you when to stop self-isolation. They may also have you change positions to help your breathing (such as lying on your belly).  If a test showed that you have COVID-19, you may be asked to donate plasma after you ve recovered. (This is called COVID-19 convalescent plasma donation.) The plasma may contain antibodies to help fight the virus in others. Experts don't know if the plasma will work well as a treatment. Research continues, and the FDA has approved it for emergency use in certain people with serious or life-threatening COVID-19. For more information, talk to your care team.  Caring for a sick person   Follow all instructions from the care team.  Wash your hands often.  Wear protective clothing as advised.  Make sure the sick person wears a mask. If they can't wear a mask, don t stay in the same room with them. If you must be in the same room, wear a face mask. Make sure the mask covers both the nose and mouth.  Keep track of the sick person s symptoms.  Clean surfaces often with disinfectant. This includes phones, kitchen counters, fridge door handles, bathroom surfaces, and others.  Don t let anyone share household items with the sick person. This includes eating and drinking tools, towels, sheets, or blankets.  Clean fabrics and laundry well.  Keep other people and pets away from the sick person.    When you can stop self-isolation  When you are sick with COVID-19, you should stay away from other people. This is called self-isolation. The rules for ending self-isolation depend on your health in general.  If you are normally healthy:  You can stop self-isolation when all 3 of these are true:  You ve had no fever--and no medicine that reduces fever--for at least 24 hours. And   Your  symptoms are better, such as cough or trouble breathing. And   At least 10 days have passed since your symptoms first started.  Talk with your care team before you leave home. They may tell you it s okay to leave, or they may give you different advice. You do not need to be re-tested.  If you have a weak immune system, or you ve had severe COVID-19:  Follow your care team s instructions. You may be asked to self-isolate for 10 days to 20 days after your symptoms first started. Your care team may want to re-test you for COVID-19.  Note: If you re being treated for cancer, have an immune disorder (such as HIV), or have had a transplant (organ or bone marrow), you may have a weak immune system.  If you've already had COVID-19 once:  If you had the virus over 3 months ago, and you ve been exposed again, treat it like you've never had COVID-19. Stay home, limit your contact with others, call your care team, and watch for symptoms.  If it s been less than 3 months since you had the virus, you re symptom-free, and you ve been exposed again: You don t need to self-isolate or be re-tested. But if you develop new symptoms that can t be linked to another illness, please self-isolate and contact your care team.  When you return to public settings  When you are well enough to go outside your home, follow the CDC s guidance on cloth face masks.  Anyone over age 2 should wear a face mask in public, especially when it's hard to socially distance. This includes public transit, public protests and marches, and crowded stores, bars, and restaurants.  Face masks are most likely to reduce the spread of COVID-19 when they are widely used by people who are out in the public.  Certain people should not wear a face covering. These include:  Children under 2 years old  Anyone with a health, developmental, or mental health condition that can be made worse by wearing a mask  Anyone who is unconscious or unable to remove the face covering  without help. See the CDC's guidance on who should not wear a face mask.  When to call your care team  Call your care team right away if a sick person has any of these:  Trouble breathing  Pain or pressure in chest  If a sick person has any of these, call 911:  Trouble breathing that gets worse  Pain or pressure in chest that gets worse  Blue tint to lips or face  Fast or irregular heartbeat  Confusion or trouble waking  Fainting or loss of consciousness  Coughing up blood  Going home from the hospital   If you have COVID-19 and were recently in the hospital:  Follow the instructions above for self-care and isolation.  Follow the hospital care team s instructions.  Ask questions if anything is unclear to you. Write down answers so you remember them.  Date last modified: 11/23/2020  [employer] last reviewed this educational content on 4/1/2020  This information has been modified by your health care provider with permission from the publisher.    8623-5273 [employer]. [address] [place] [address], [place] [address]. All rights reserved. This information is not intended as a substitute for professional medical care. Always follow your healthcare professional's instructions.

## 2022-05-12 LAB — SARS-COV-2 RNA RESP QL NAA+PROBE: NEGATIVE

## 2022-05-25 ENCOUNTER — NURSE TRIAGE (OUTPATIENT)
Dept: FAMILY MEDICINE | Facility: CLINIC | Age: 87
End: 2022-05-25
Payer: COMMERCIAL

## 2022-05-25 NOTE — TELEPHONE ENCOUNTER
"5/11/22 went to  for sore throat     Negative COVID-19 test     After that lost voice and still has not come back, 3 weeks    No fever or breathing concerns     Allergies/Irritants? Has allergies - takes allergy medication. Otherwise allergies seem okay     \"sometimes\" has a runny nose     Per protocol recommended visit within next 3 days, offered to schedule. We didn't have specific date/time pt wanted so she said she wants to just go back to  instead    Lucia MCKNIGHT Triage RN  [facility]        Appointments in Next Year    Jun 08, 2022  1:00 PM  (Arrive by 12:45 PM)  Hand Eval with Almaz Godoy OT  [facility] [place] ([facility] ) [phone]   Jun 27, 2022 10:30 AM  (Arrive by 10:10 AM)  Provider Visit with Maryan Churchill MD  [facility] ([facility] ) [phone]   Sep 14, 2022  2:00 PM  LAB with CS LAB  [facility] ([facility] ) [phone]   Sep 14, 2022  3:00 PM  New Visit with Jessica Medrano MD  [facility] ([facility] ) [phone]          Reason for Disposition    Hoarseness persists > 2 weeks    Additional Information    Negative: Severe difficulty breathing (e.g., struggling for each breath, speaks in single words)    Negative: Bluish (or gray) lips or face now    Negative: Stridor with difficulty breathing    Negative: Started suddenly after sting from bee, wasp, or yellow jacket    Negative: Started suddenly after taking a medicine or allergic food (e.g., nuts, seafood)    Negative: Started suddenly along with widespread hives    Negative: Can't swallow normal secretions (e.g., drooling or spitting)    Negative: Tongue or facial swelling    Negative: Sounds like a life-threatening emergency to the triager    Negative: Nasal allergies also present and " they are acting up    Negative: Cold symptoms are main concern    Negative: Sore throat is main symptom    Negative: Resolved choking episode and hoarseness lasts > 30 minutes    Negative: Direct blow to front of neck    Negative: Hoarseness starting in past 24 hours AND taking an ACE Inhibitor medication (e.g., benazepril/LOTENSIN, captopril/CAPOTEN, enalapril/VASOTEC, lisinopril/ZESTRIL)    Negative: Difficulty breathing    Negative: Patient sounds very sick or weak to the triager    Negative: Fever > 103 F (39.4 C)    Negative: SEVERE sore throat pain    Negative: Fever present > 3 days (72 hours)    Negative: Hoarseness starting > 24 hours ago AND taking an ACE Inhibitor medication (e.g., benazepril/LOTENSIN, captopril/CAPOTEN, enalapril/VASOTEC, lisinopril/ZESTRIL)    Protocols used: [id number]

## 2022-05-26 ENCOUNTER — TRANSFERRED RECORDS (OUTPATIENT)
Dept: MULTI SPECIALTY CLINIC | Facility: CLINIC | Age: 87
End: 2022-05-26

## 2022-05-26 ENCOUNTER — OFFICE VISIT (OUTPATIENT)
Dept: URGENT CARE | Facility: URGENT CARE | Age: 87
End: 2022-05-26
Payer: COMMERCIAL

## 2022-05-26 VITALS
HEART RATE: 100 BPM | DIASTOLIC BLOOD PRESSURE: 81 MMHG | RESPIRATION RATE: 20 BRPM | SYSTOLIC BLOOD PRESSURE: 171 MMHG | WEIGHT: 280 LBS | OXYGEN SATURATION: 96 % | TEMPERATURE: 98.1 F | BODY MASS INDEX: 46.59 KG/M2

## 2022-05-26 DIAGNOSIS — J04.0 LARYNGITIS: Primary | ICD-10-CM

## 2022-05-26 LAB
RETINOPATHY: NEGATIVE
RETINOPATHY: NORMAL

## 2022-05-26 PROCEDURE — 99213 OFFICE O/P EST LOW 20 MIN: CPT | Performed by: PHYSICIAN ASSISTANT

## 2022-05-26 NOTE — PROGRESS NOTES
Laryngitis  Okay to take acetaminophen 500 mg- 2 tabs (Total of 1000 mg) every 8 hrs     Encourage warm beverages and voice rest.     SEBASTIEN Mcghee [facility]    Subjective   88 year old who presents to clinic today for the following health issues:    Urgent Care       HPI     Acute Illness  Acute illness concerns: Laryngitis, congestion - Loss of voice  Onset/Duration: 2 weeks  Symptoms:  Fever: no  Chills/Sweats: no  Headache (location?): no  Sinus Pressure: no  Conjunctivitis:  no  Ear Pain: no  Rhinorrhea: YES  Congestion: YES  Sore Throat: no  Cough: no  Wheeze: no- Patient has shortness of breath but states that this is always the case.   Decreased Appetite: no  Nausea: no  Vomiting: no  Diarrhea: no  Dysuria/Freq.: no  Dysuria or Hematuria: no  Fatigue/Achiness: no  Sick/Strep Exposure: no  Therapies tried and outcome: None      Review of Systems   Review of Systems   See HPI     Objective    Temp: 98.1  F (36.7  C)   BP: (!) 171/81 Pulse: 100   Resp: 20 SpO2: 96 %       Physical Exam   Physical Exam  Constitutional:       General: She is not in acute distress.     Appearance: Normal appearance. She is normal weight. She is not ill-appearing, toxic-appearing or diaphoretic.   HENT:      Head: Normocephalic and atraumatic.   Cardiovascular:      Rate and Rhythm: Normal rate and regular rhythm.      Pulses: Normal pulses.      Heart sounds: Normal heart sounds. No murmur heard.    No friction rub. No gallop.   Pulmonary:      Effort: Pulmonary effort is normal. No respiratory distress.      Breath sounds: No stridor. No wheezing, rhonchi or rales.   Chest:      Chest wall: No tenderness.   Neurological:      Mental Status: She is alert.   Psychiatric:         Mood and Affect: Mood normal.         Behavior: Behavior normal.         Thought Content: Thought content normal.         Judgment: Judgment normal.          No results found for this or any previous visit (from the past 24  hour(s)).

## 2022-06-03 DIAGNOSIS — I25.10 CORONARY ARTERY DISEASE INVOLVING NATIVE CORONARY ARTERY OF NATIVE HEART WITHOUT ANGINA PECTORIS: ICD-10-CM

## 2022-06-03 DIAGNOSIS — E11.21 TYPE 2 DIABETES MELLITUS WITH DIABETIC NEPHROPATHY, WITHOUT LONG-TERM CURRENT USE OF INSULIN (H): ICD-10-CM

## 2022-06-03 NOTE — TELEPHONE ENCOUNTER
LOV 4- Kerline    Appointments in Next Year    Jun 08, 2022  1:00 PM  (Arrive by 12:45 PM)  Hand Eval with Almaz Godoy OT  [facility] [place] ([facility] ) [phone]   Jun 27, 2022 10:30 AM  (Arrive by 10:10 AM)  Provider Visit with Maryan Churchill MD  [facility] ([facility] ) [phone]   Sep 14, 2022  2:00 PM  LAB with CS LAB  [facility] ([facility] ) [phone]   Sep 14, 2022  3:00 PM  New Visit with Jessica Medrano MD  [facility] ([facility] ) [phone]          Yodit Mbcride RT (R)

## 2022-06-05 NOTE — PROGRESS NOTES
SUBJECTIVE:   Moira Andre is a 88 year old female presenting with a chief complaint of shortness of breath and sore throat.  Onset of symptoms was 2 day(s) ago.  Course of illness is same.    Severity mild  Current and Associated symptoms: as above    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     Myocardial infarction (H) 9/2007, cath 2013 ml    BMS: stent to OM, diag, nl EF, echo /C angia 2013 , f/u cath no lesion >40%     Nephrolithiasis     right side     OA (osteoarthritis) of knee      Obesity      Rheumatoid arthritis flare (H)     prednisone     Sleep apnea     restarted using cpap 2017     TIA (transient ischaemic attack)      Ventral hernia, unspecified, without mention of obstruction or gangrene      Current Outpatient Medications   Medication Sig Dispense Refill     acetaminophen (TYLENOL) 500 MG tablet Take 2 tablets (1,000 mg) by mouth 3 times daily       albuterol (PROAIR HFA/PROVENTIL HFA/VENTOLIN HFA) 108 (90 Base) MCG/ACT inhaler INHALE 2 PUFFS INTO THE LUNGS EVERY 6 HOURS FOR SHORTNESS OF BREATH 54 g 1     amLODIPine (NORVASC) 5 MG tablet Take 1 tablet (5 mg) by mouth daily for Blood Pressure 90 tablet 3     atorvastatin (LIPITOR) 20 MG tablet Take 1 tablet (20 mg) by mouth daily for cholestrol 90 tablet 3     blood glucose (TRUE METRIX BLOOD GLUCOSE TEST) test strip 100 strips by In Vitro route daily Use to test blood sugar once times daily or as directed. 100 strip 1      Blood Glucose Calibration (TRUE METRIX LEVEL 1) Low SOLN 1 each daily as needed (to calibrate blood glucose machine) 1 each 3     blood glucose monitoring (NO BRAND SPECIFIED) meter device kit Use to test blood sugar 1-2 times daily or as directed. 1 kit 0     Blood Glucose Monitoring Suppl (TRUE METRIX AIR GLUCOSE METER) w/Device KIT 1 Units See Admin Instructions 1 kit 0     buPROPion (WELLBUTRIN SR) 100 MG 12 hr tablet Take 1 tablet (100 mg) by mouth daily for mood 90 tablet 3     Cholecalciferol (VITAMIN D) 2000 UNITS CAPS Take 2,000 Units by mouth daily        clopidogrel (PLAVIX) 75 MG tablet Take 1 tablet (75 mg) by mouth daily 90 tablet 2     DULoxetine (CYMBALTA) 60 MG capsule TAKE 1 CAPSULE(60 MG) BY MOUTH DAILY 30 capsule 0     empagliflozin (JARDIANCE) 10 MG TABS tablet Take 1 tablet (10 mg) by mouth daily for diabetes 90 tablet 1     fluticasone (FLONASE) 50 MCG/ACT nasal spray Spray 1 spray into both nostrils daily as needed        fluticasone-salmeterol (ADVAIR) 100-50 MCG/DOSE inhaler Inhale 1 puff into the lungs every 12 hours 60 each 4     fluticasone-salmeterol (ADVAIR) 100-50 MCG/DOSE inhaler INHALE 1 PUFF EVERY 12 HOURS 60 each 3     glimepiride (AMARYL) 2 MG tablet Take 1 tablet (2 mg) by mouth every morning (before breakfast) for diabetese 30 tablet 0     IBANdronate (BONIVA) 150 MG tablet TAKE 1 TABLET EVERY 30 DAYS FOR OSTEOPENIA 3 tablet 3     Lidocaine (LIDOCARE) 4 % Patch Place 1 patch onto the skin every 24 hours To prevent lidocaine toxicity, patient should be patch free for 12 hrs daily.       losartan (COZAAR) 50 MG tablet Take 1 tablet (50 mg) by mouth daily for Blood Pressure 90 tablet 1     Menthol, Topical Analgesic, (ICY HOT EX) Apply to affected area every morning       metoprolol succinate ER (TOPROL-XL) 100 MG 24 hr tablet Take 1 tablet (100 mg) by mouth daily 90 tablet 2     nitroGLYcerin (NITROSTAT) 0.4 MG sublingual tablet For chest pain place 1 tablet under the tongue  every 5 minutes for 3 doses. If symptoms persist 5 minutes after 1st dose call 911. 25 tablet 0     order for DME Equipment being ordered: diabetic shoes 1 each 0     order for DME DREAMSTATION  5-15 CM/H20  [employer]        oxyCODONE (ROXICODONE) 5 MG tablet Take 1 tablet (5 mg) by mouth daily as needed for pain 20 tablet 0     pantoprazole (PROTONIX) 20 MG EC tablet Take 1 tablet (20 mg) by mouth daily 90 tablet 1     polyethylene glycol (MIRALAX) 17 g packet Take 17 g by mouth daily       triamcinolone (KENALOG) 0.1 % external cream Apply topically 2 times daily 85 g 1     TRUEplus Lancets 33G MISC 1 each 2 times daily 100 each 0     Social History     Tobacco Use     Smoking status: Former Smoker     Packs/day: 0.25     Years: 1.00     Pack years: 0.25     Types: Cigarettes     Start date:      Quit date: 1973     Years since quittin.1     Smokeless tobacco: Never Used   Substance Use Topics     Alcohol use: Yes     Alcohol/week: 0.0 - 1.0 standard drinks     Comment: rarely       ROS:  Review of systems negative except as stated above.    OBJECTIVE:  BP (!) 153/85   Pulse 69   Temp 98.2  F (36.8  C) (Oral)   Resp 21   SpO2 95%   GENERAL APPEARANCE: healthy, alert and no distress  HENT: ear canals and TM's normal.  Nose and mouth without ulcers, erythema or lesions  NECK: supple, nontender, no lymphadenopathy  RESP: lungs clear to auscultation - no rales, rhonchi or wheezes  CV: regular rates and rhythm, normal S1 S2, no murmur noted  NEURO: Normal strength and tone, sensory exam grossly normal,  normal speech and mentation  SKIN: no suspicious lesions or rashes      Results for orders placed or performed in visit on 22   Symptomatic; Yes; 2022 COVID-19 Virus (Coronavirus) by PCR Nose     Status: Normal    Specimen: Nose; Swab   Result Value Ref Range    SARS CoV2 PCR Negative Negative, Testing sent to reference lab. Results will be returned via unsolicited result    Narrative     Testing was performed using the heike SARS-CoV-2 assay on the heike  Ulabox0 System. This test should be ordered for the detection of  SARS-CoV-2 in individuals who meet SARS-CoV-2 clinical and/or  epidemiological criteria. Test performance is unknown in asymptomatic  patients. This test is for in vitro diagnostic use under the FDA EUA  for laboratories certified under CLIA to perform high and/or moderate  complexity testing. This test has not been FDA cleared or approved. A  negative result does not rule out the presence of PCR inhibitors in  the specimen or target RNA in concentration below the limit of  detection for the assay. The possibility of a false negative should  be considered if the patient's recent exposure or clinical  presentation suggests COVID-19. This test was validated by the [facility]. This  laboratory is certified under the Clinical Laboratory Improvement  Amendments of 1988 (CLIA-88) as qualified to perform high and/or  moderate complexity laboratory testing.   Streptococcus A Rapid Screen w/Reflex to PCR     Status: Normal    Specimen: Throat; Swab   Result Value Ref Range    Group A Strep antigen Negative Negative   Group A Streptococcus PCR Throat Swab     Status: Normal    Specimen: Throat; Swab   Result Value Ref Range    Group A strep by PCR Not Detected Not Detected    Narrative    The Xpert Xpress Strep A test, performed on the AssayMetrics Systems, is a rapid, qualitative in vitro diagnostic test for the detection of Streptococcus pyogenes (Group A ß-hemolytic Streptococcus, Strep A) in throat swab specimens from patients with signs and symptoms of pharyngitis. The Xpert Xpress Strep A test can be used as an aid in the diagnosis of Group A Streptococcal pharyngitis. The assay is not intended to monitor treatment for Group A Streptococcus infections. The Xpert Xpress Strep A test utilizes an automated real-time polymerase chain  reaction (PCR) to detect Streptococcus pyogenes DNA.        ASSESSMENT:  (R07.0) Throat pain  (primary encounter diagnosis)  Plan: Symptomatic; Yes; 5/9/2022 COVID-19 Virus         (Coronavirus) by PCR Nose, Streptococcus A         Rapid Screen w/Reflex to PCR, Group A         Streptococcus PCR Throat Swab, CANCELED:         Influenza A & B Antigen         (R06.02) SOB (shortness of breath)  Plan: Symptomatic; Yes; 5/9/2022 COVID-19 Virus         (Coronavirus) by PCR Nose, CANCELED: Influenza         A & B Antigen          Patient Instructions   Albuterol 1-2 puffs every 4 hours  Follow up immediately with chest pain  If covid is positive, follow up to discuss treatment options        Follow up immediately with severe headache, chest pain, or shortness of breath    Rest, isolate for results, hydrate, follow up if worsening or new symptoms  Unvaccinated household members to isolate until test results, if positive isolate for 2 weeks and follow up for testing if symptoms occur         Patient Education     Coronavirus Disease 2019 (COVID-19): Caring for Yourself or Others   If you or a household member have symptoms of COVID-19, follow the guidelines below. This will help you manage symptoms and keep the virus from spreading.  If you have symptoms of COVID-19    Stay home and contact your care team. They will tell you what to do.    Don t panic. Keep in mind that other illnesses can cause similar symptoms.    Stay away from work, school, and public places.    Limit physical contact with others in your home. Limit visitors. No kissing.  Clean surfaces you touch with disinfectant.  If you need to cough or sneeze, do it into a tissue. Then throw the tissue into the trash. If you don't have tissues, cough or sneeze into the bend of your elbow.  Don t share food or personal items with people in your household. This includes items like eating and drinking utensils, towels, and bedding.  Wear a cloth face mask around other  people. During a public health emergency, medical face masks may be reserved for healthcare workers. You may need to make a cloth face mask of your own. You can do this using a bandana, T-shirt, or other cloth. The [facility] has instructions on how to make a face mask. Wear the mask so that it covers both your nose and mouth.  If you need to go to a hospital or clinic, call ahead to let them know. Expect the care team to wear masks, gowns, gloves, and eye protection. You may need to come to a different entrance or wait in a separate room.  Follow all instructions from your care team.    If you ve been diagnosed with COVID-19    Stay home and away from others, including other people in your home. (This is called self-isolation.) Don t leave home unless you need to get medical care. Don t go to work, school, or public places. Don t use buses, taxis, or other public transportation.    Follow all instructions from your care team.    If you need to go to a hospital or clinic, call ahead to let them know. Expect the care team to wear masks, gowns, gloves, and eye protection. You may need to come to a different entrance or wait in a separate room.    Wear a face mask over your nose and mouth. This is to protect others from your germs. If you can t wear a mask, your caregivers should wear one. You may need to make your own mask using a bandana, T-shirt, or other cloth. See the CDC s instructions on how to make a face mask.    Have no contact with pets and other animals.    Don t share food or personal items with people in your household. This includes items like eating and drinking utensils, towels, and bedding.    If you need to cough or sneeze, do it into a tissue. Then throw the tissue into the trash. If you don't have tissues, cough or sneeze into the bend of your elbow.    Wash your hands often.    Self-care at home   At this time, there is no medicine approved to prevent or treat COVID-19. The FDA has approved an antiviral  medicine (called remdesivir) for people being treated in the hospital. This is for people 12 years and older who weigh more than about 88 pounds (40 kgs). In certain cases, it may also be used for people younger than 12 years or who weigh less than about 88 pounds (40 kgs)..  Currently, treatment is mostly aimed at helping your body while it fights the virus.    Getting extra rest.    Drink extra fluids 6 to 8 glasses of liquids each day), unless a doctor has told you not to. Ask your care team which fluids are best for you. Avoid fluids that contain caffeine or alcohol.    Taking over-the-counter (OTC) medicine to reduce pain and fever. Your care team will tell you which medicine to use.  If you ve been in the hospital for COVID-19, follow your care team s instructions. They will tell you when to stop self-isolation. They may also have you change positions to help your breathing (such as lying on your belly).  If a test showed that you have COVID-19, you may be asked to donate plasma after you ve recovered. (This is called COVID-19 convalescent plasma donation.) The plasma may contain antibodies to help fight the virus in others. Experts don't know if the plasma will work well as a treatment. Research continues, and the FDA has approved it for emergency use in certain people with serious or life-threatening COVID-19. For more information, talk to your care team.  Caring for a sick person     Follow all instructions from the care team.    Wash your hands often.    Wear protective clothing as advised.    Make sure the sick person wears a mask. If they can't wear a mask, don t stay in the same room with them. If you must be in the same room, wear a face mask. Make sure the mask covers both the nose and mouth.    Keep track of the sick person s symptoms.    Clean surfaces often with disinfectant. This includes phones, kitchen counters, fridge door handles, bathroom surfaces, and others.    Don t let anyone share  household items with the sick person. This includes eating and drinking tools, towels, sheets, or blankets.    Clean fabrics and laundry well.    Keep other people and pets away from the sick person.    When you can stop self-isolation  When you are sick with COVID-19, you should stay away from other people. This is called self-isolation. The rules for ending self-isolation depend on your health in general.  If you are normally healthy:  You can stop self-isolation when all 3 of these are true:    You ve had no fever--and no medicine that reduces fever--for at least 24 hours. And     Your symptoms are better, such as cough or trouble breathing. And     At least 10 days have passed since your symptoms first started.  Talk with your care team before you leave home. They may tell you it s okay to leave, or they may give you different advice. You do not need to be re-tested.  If you have a weak immune system, or you ve had severe COVID-19:  Follow your care team s instructions. You may be asked to self-isolate for 10 days to 20 days after your symptoms first started. Your care team may want to re-test you for COVID-19.  Note: If you re being treated for cancer, have an immune disorder (such as HIV), or have had a transplant (organ or bone marrow), you may have a weak immune system.  If you've already had COVID-19 once:  If you had the virus over 3 months ago, and you ve been exposed again, treat it like you've never had COVID-19. Stay home, limit your contact with others, call your care team, and watch for symptoms.  If it s been less than 3 months since you had the virus, you re symptom-free, and you ve been exposed again: You don t need to self-isolate or be re-tested. But if you develop new symptoms that can t be linked to another illness, please self-isolate and contact your care team.  When you return to public settings  When you are well enough to go outside your home, follow the CDC s guidance on cloth face  masks.    Anyone over age 2 should wear a face mask in public, especially when it's hard to socially distance. This includes public transit, public protests and marches, and crowded stores, bars, and restaurants.    Face masks are most likely to reduce the spread of COVID-19 when they are widely used by people who are out in the public.  Certain people should not wear a face covering. These include:    Children under 2 years old    Anyone with a health, developmental, or mental health condition that can be made worse by wearing a mask    Anyone who is unconscious or unable to remove the face covering without help. See the CDC's guidance on who should not wear a face mask.  When to call your care team  Call your care team right away if a sick person has any of these:    Trouble breathing    Pain or pressure in chest  If a sick person has any of these, call 911:    Trouble breathing that gets worse    Pain or pressure in chest that gets worse    Blue tint to lips or face    Fast or irregular heartbeat    Confusion or trouble waking    Fainting or loss of consciousness    Coughing up blood  Going home from the hospital   If you have COVID-19 and were recently in the hospital:    Follow the instructions above for self-care and isolation.    Follow the hospital care team s instructions.    Ask questions if anything is unclear to you. Write down answers so you remember them.  Date last modified: 11/23/2020  [employer] last reviewed this educational content on 4/1/2020  This information has been modified by your health care provider with permission from the publisher.    6430-7589 [employer]. [address] [place] [address], [place] [address]. All rights reserved. This information is not intended as a substitute for professional medical care. Always follow your healthcare professional's instructions.

## 2022-06-07 RX ORDER — CLOPIDOGREL BISULFATE 75 MG/1
75 TABLET ORAL DAILY
Qty: 90 TABLET | Refills: 2 | Status: SHIPPED | OUTPATIENT
Start: 2022-06-07 | End: 2023-01-03

## 2022-06-07 RX ORDER — GLIMEPIRIDE 2 MG/1
2 TABLET ORAL
Qty: 90 TABLET | Refills: 1 | Status: SHIPPED | OUTPATIENT
Start: 2022-06-07 | End: 2022-08-18

## 2022-06-07 NOTE — TELEPHONE ENCOUNTER
"    Jun 27, 2022 10:30 AM  (Arrive by 10:10 AM)  Provider Visit with Maryan Churchill MD  [facility] ([facility] ) [phone]     Plavix - Prescription approved per [facility] Refill Protocol.    Glimepiride -Routing refill request to provider for review/approval because:  Labs out of range:   Creatinine   Date Value Ref Range Status   05/04/2022 1.45 (H) 0.52 - 1.04 mg/dL Final   02/18/2021 1.50 (H) 0.52 - 1.04 mg/dL Final       Requested Prescriptions   Pending Prescriptions Disp Refills     clopidogrel (PLAVIX) 75 MG tablet 90 tablet 2     Sig: Take 1 tablet (75 mg) by mouth daily       Plavix Passed - 6/7/2022  7:50 AM        Passed - No active PPI on record unless is Protonix        Passed - Normal HGB on file in past 12 months     Recent Labs   Lab Test 03/03/22  1208   HGB 14.1               Passed - Normal Platelets on file in past 12 months     Recent Labs   Lab Test 03/03/22  1208                  Passed - Recent (12 mo) or future (30 days) visit within the authorizing provider's specialty     Patient has had an office visit with the authorizing provider or a provider within the authorizing providers department within the previous 12 mos or has a future within next 30 days. See \"Patient Info\" tab in inbasket, or \"Choose Columns\" in Meds & Orders section of the refill encounter.              Passed - Medication is active on med list        Passed - Patient is age 18 or older        Passed - No active pregnancy on record        Passed - No positive pregnancy test in past 12 months           glimepiride (AMARYL) 2 MG tablet 30 tablet 0     Sig: Take 1 tablet (2 mg) by mouth every morning (before breakfast) for diabetese       Sulfonylurea Agents Failed - 6/7/2022  7:50 AM        Failed - Patient has a recent creatinine (normal) within the past 12 mos.     Recent Labs   Lab Test 05/04/22  1041 04/01/19  1524 03/16/19  0736   CR 1.45*   < >  --    CREAT  --   --  " "1.9*    < > = values in this interval not displayed.       Ok to refill medication if creatinine is low          Passed - Patient has documented A1c within the specified period of time.     If HgbA1C is 8 or greater, it needs to be on file within the past 3 months.  If less than 8, must be on file within the past 6 months.     Recent Labs   Lab Test 03/03/22  1208   A1C 7.2*             Passed - Medication is active on med list        Passed - Patient is age 18 or older        Passed - No active pregnancy on record        Passed - Patient has not had a positive pregnancy test within the past 12 mos.        Passed - Recent (6 mo) or future (30 days) visit within the authorizing provider's specialty     Patient had office visit in the last 6 months or has a visit in the next 30 days with authorizing provider or within the authorizing provider's specialty.  See \"Patient Info\" tab in inbasket, or \"Choose Columns\" in Meds & Orders section of the refill encounter.                 "

## 2022-06-08 ENCOUNTER — THERAPY VISIT (OUTPATIENT)
Dept: OCCUPATIONAL THERAPY | Facility: CLINIC | Age: 87
End: 2022-06-08
Payer: COMMERCIAL

## 2022-06-08 DIAGNOSIS — M79.642 BILATERAL HAND PAIN: ICD-10-CM

## 2022-06-08 DIAGNOSIS — M19.042 PRIMARY OSTEOARTHRITIS OF BOTH HANDS: ICD-10-CM

## 2022-06-08 DIAGNOSIS — M19.041 PRIMARY OSTEOARTHRITIS OF BOTH HANDS: ICD-10-CM

## 2022-06-08 DIAGNOSIS — M79.641 BILATERAL HAND PAIN: ICD-10-CM

## 2022-06-08 PROCEDURE — 97140 MANUAL THERAPY 1/> REGIONS: CPT | Mod: GO | Performed by: OCCUPATIONAL THERAPIST

## 2022-06-08 PROCEDURE — 97165 OT EVAL LOW COMPLEX 30 MIN: CPT | Mod: GO | Performed by: OCCUPATIONAL THERAPIST

## 2022-06-08 PROCEDURE — 97110 THERAPEUTIC EXERCISES: CPT | Mod: GO | Performed by: OCCUPATIONAL THERAPIST

## 2022-06-08 PROCEDURE — 97035 APP MDLTY 1+ULTRASOUND EA 15: CPT | Mod: GO | Performed by: OCCUPATIONAL THERAPIST

## 2022-06-08 NOTE — PROGRESS NOTES
Hand Therapy Initial Evaluation    Current Date:  6/8/2022    Diagnosis: Bilateral hand pain  DOI: MD date 6/8/22    Subjective:  Moira Andre is a 88 year old female.    Patient reports symptoms of the bilateral hand which occurred due to OA. Since onset symptoms are Gradually getting worse.  General health as reported by patient is good.  Pertinent medical history includes:Asthma, Depression, Diabetes, High Blood Pressure, Implated Device, Incontinence, Kidney Disease, Osteoarthritis, Overweight, Sleep Disorder/Apnea  Medical allergies:aspirin; yellow dye.  Surgical history: knees and stent.  Medication history: Anti-depressants, High Blood Pressure.    Current occupation is retired [employer]        Occupational Profile Information:  Right hand dominant  Prior functional level:  no limitations  Patient reports symptoms of pain, stiffness/loss of motion, weakness/loss of strength and edema  Special tests:  none.    Previous treatment: none  Barriers include:none  Mobility: Ambulates with aid of walker  Transportation: drives  Currently retired  Leisure activities/hobbies: sewing, stamping      Functional Outcome Measure:   Upper Extremity Functional Index Score:  SCORE:   Column Totals: /80: 71   (A lower score indicates greater disability.)    Objective:  Pain Level (Scale 0-10)   6/8/2022   At Rest 0   With Use 1     Pain Description  Date 6/8/2022   Location thumb and long finger   Pain Quality Aching   Frequency intermittent     Pain is worst  daytime   Exacerbated by  use   Relieved by heat and NSAIDs   Progression worsening     Edema  Data lost during computer issue    Sensation   WNL throughout all nerve distributions; per patient report    ROM   Data lost during computer issue    Strength  Data lost during computer issue    Assessment:  Patient presents with symptoms consistent with diagnosis of bilateral hand  pain,  with conservative intervention.     Patient's limitations or Problem List includes:  Pain,  Decreased ROM/motion, Increased edema, Weakness, Decreased , Decreased pinch and Adherence in connective tissue of the bilateral thumb and long finger which interferes with the patient's ability to perform Self Care Tasks (dressing, eating, bathing, hygiene/toileting), Sleep Patterns, Recreational Activities and Household Chores as compared to previous level of function.    Rehab Potential:  Good - Return to full activity, some limitations    Patient will benefit from skilled Occupational Therapy to increase flexibility and overall strength and decrease pain and edema to return to previous activity level and resume normal daily tasks and to reach their rehab potential.    Barriers to Learning:  No barrier    Communication Issues:  Patient appears to be able to clearly communicate and understand verbal and written communication and follow directions correctly.    Chart Review: Simple history review with patient    Identified Performance Deficits: bathing/showering, toileting, dressing, care of pets, driving and community mobility, health management and maintenance, home establishment and management, meal preparation and cleanup, shopping and leisure activities    Assessment of Occupational Performance:  5 or more Performance Deficits    Clinical Decision Making (Complexity): Low complexity    Treatment Explanation:  The following has been discussed with the patient:  RX ordered/plan of care  Anticipated outcomes  Possible risks and side effects    Plan:  Frequency:  2 X a month, once daily  Duration:  for 3 months    Treatment Plan:   Modalities:  US  Therapeutic Exercise:  AROM, AAROM, PROM and Tendon Gliding  Manual Techniques:  Joint mobilization and Manual edema mobilization  Self Care:  Self Care Tasks and Ergonomic Considerations    Discharge Plan:  Achieve all LTG.  Independent in home treatment program.  Reach maximal therapeutic benefit.    Home Exercise Program:  Exercise Name: Ana Massage to Thumb -  Sessions: 1, Notes: Hold on tender spot for 30-60 seconds.  Move to the next spot. Search for tender areas in the entire thumb pad.  Exercise Name: Education Sheet General - Sessions: 1-3, Notes: Massage tip to base approximately 10-20 times  Exercise Name: Finger Active Range of Motion FDS Flexor Tendon Gliding - Reps: 5-10 - Sessions: 1-3, Notes: Hold each rep about 5 sec  Exercise Name: Finger Active Range of Motion DIP Joint Blocking    Next Visit:  US  ROM  MFR  Joint protection and adaptive equipment education

## 2022-06-08 NOTE — PROGRESS NOTES
[facility]    OUTPATIENT Occupational Therapy ORTHOPEDIC EVALUATION  PLAN OF TREATMENT FOR OUTPATIENT REHABILITATION  (COMPLETE FOR INITIAL CLAIMS ONLY)  Patient's Last Name, First Name, M.I.  YOB: 1933  Moira Andre    Provider s Name:  [facility]   Medical Record No.  [id number]   Start of Care Date:  06/08/22   Onset Date:    (MD date 6/8/22)   Type:     __PT   __x_OT Medical Diagnosis:    Encounter Diagnoses   Name Primary?    Primary osteoarthritis of both hands     Bilateral hand pain         Treatment Diagnosis:  bilateral hand pain        Goals:     06/08/22 0500   Goal #1   Goal #1 household chores   Previous Performance Level Independent   Current Functional Task    Current Performance Level mild diff   STG Target Perfomance Other - on additional line   Other Household Chores lift walker   STG Target Perform Level no diff   Due Date 06/29/22   LTG Target Task/Performance No Difficulty   Due Date 09/05/22         Therapy Frequency:  2x/month  Predicted Duration of Therapy Intervention:  3 months    Almaz Godoy OT                 I CERTIFY THE NEED FOR THESE SERVICES FURNISHED UNDER        THIS PLAN OF TREATMENT AND WHILE UNDER MY CARE     (Physician attestation of this document indicates review and certification of the therapy plan).                     Certification Date From:  06/08/22   Certification Date To:  09/05/22    Referring Provider:  Leighton Patel    Initial Assessment        See Epic Evaluation SOC Date: 06/08/22

## 2022-06-27 ENCOUNTER — OFFICE VISIT (OUTPATIENT)
Dept: FAMILY MEDICINE | Facility: CLINIC | Age: 87
End: 2022-06-27
Payer: COMMERCIAL

## 2022-06-27 VITALS
DIASTOLIC BLOOD PRESSURE: 84 MMHG | HEIGHT: 65 IN | HEART RATE: 76 BPM | SYSTOLIC BLOOD PRESSURE: 144 MMHG | BODY MASS INDEX: 46.19 KG/M2 | WEIGHT: 277.2 LBS | TEMPERATURE: 95.1 F | RESPIRATION RATE: 18 BRPM | OXYGEN SATURATION: 96 %

## 2022-06-27 DIAGNOSIS — I10 ESSENTIAL HYPERTENSION: ICD-10-CM

## 2022-06-27 DIAGNOSIS — E11.21 TYPE 2 DIABETES MELLITUS WITH DIABETIC NEPHROPATHY, WITHOUT LONG-TERM CURRENT USE OF INSULIN (H): Primary | ICD-10-CM

## 2022-06-27 DIAGNOSIS — J45.20 INTERMITTENT ASTHMA, UNCOMPLICATED: ICD-10-CM

## 2022-06-27 DIAGNOSIS — M15.0 PRIMARY OSTEOARTHRITIS INVOLVING MULTIPLE JOINTS: ICD-10-CM

## 2022-06-27 DIAGNOSIS — M54.16 LUMBAR RADICULOPATHY: ICD-10-CM

## 2022-06-27 DIAGNOSIS — Z23 HIGH PRIORITY FOR 2019-NCOV VACCINE: ICD-10-CM

## 2022-06-27 DIAGNOSIS — F32.1 MODERATE MAJOR DEPRESSION (H): ICD-10-CM

## 2022-06-27 DIAGNOSIS — N18.32 STAGE 3B CHRONIC KIDNEY DISEASE (H): ICD-10-CM

## 2022-06-27 DIAGNOSIS — G47.33 OSA ON CPAP: ICD-10-CM

## 2022-06-27 DIAGNOSIS — Z79.899 MEDICATION MANAGEMENT: ICD-10-CM

## 2022-06-27 DIAGNOSIS — E87.5 HYPERKALEMIA: ICD-10-CM

## 2022-06-27 DIAGNOSIS — G89.29 OTHER CHRONIC PAIN: ICD-10-CM

## 2022-06-27 DIAGNOSIS — R35.0 URINARY FREQUENCY: ICD-10-CM

## 2022-06-27 LAB
ALBUMIN UR-MCNC: 30 MG/DL
AMPHETAMINES UR QL: NOT DETECTED
APPEARANCE UR: CLEAR
BACTERIA #/AREA URNS HPF: ABNORMAL /HPF
BARBITURATES UR QL SCN: NOT DETECTED
BENZODIAZ UR QL SCN: NOT DETECTED
BILIRUB UR QL STRIP: NEGATIVE
BUPRENORPHINE UR QL: NOT DETECTED
CANNABINOIDS UR QL: NOT DETECTED
COCAINE UR QL SCN: NOT DETECTED
COLOR UR AUTO: YELLOW
D-METHAMPHET UR QL: NOT DETECTED
GLUCOSE UR STRIP-MCNC: NEGATIVE MG/DL
HBA1C MFR BLD: 6.7 % (ref 0–5.6)
HGB UR QL STRIP: ABNORMAL
KETONES UR STRIP-MCNC: NEGATIVE MG/DL
LEUKOCYTE ESTERASE UR QL STRIP: ABNORMAL
METHADONE UR QL SCN: NOT DETECTED
NITRATE UR QL: NEGATIVE
OPIATES UR QL SCN: NOT DETECTED
OXYCODONE UR QL SCN: NOT DETECTED
PCP UR QL SCN: NOT DETECTED
PH UR STRIP: 5 [PH] (ref 5–7)
PROPOXYPH UR QL: NOT DETECTED
RBC #/AREA URNS AUTO: ABNORMAL /HPF
SP GR UR STRIP: 1.02 (ref 1–1.03)
SQUAMOUS #/AREA URNS AUTO: ABNORMAL /LPF
TRICYCLICS UR QL SCN: NOT DETECTED
UROBILINOGEN UR STRIP-ACNC: 0.2 E.U./DL
WBC #/AREA URNS AUTO: ABNORMAL /HPF

## 2022-06-27 PROCEDURE — 80069 RENAL FUNCTION PANEL: CPT | Performed by: INTERNAL MEDICINE

## 2022-06-27 PROCEDURE — 36415 COLL VENOUS BLD VENIPUNCTURE: CPT | Performed by: INTERNAL MEDICINE

## 2022-06-27 PROCEDURE — 81001 URINALYSIS AUTO W/SCOPE: CPT | Mod: 59 | Performed by: INTERNAL MEDICINE

## 2022-06-27 PROCEDURE — 80306 DRUG TEST PRSMV INSTRMNT: CPT | Performed by: INTERNAL MEDICINE

## 2022-06-27 PROCEDURE — 91306 COVID-19,PF,MODERNA (18+ YRS BOOSTER .25ML): CPT | Performed by: INTERNAL MEDICINE

## 2022-06-27 PROCEDURE — 99214 OFFICE O/P EST MOD 30 MIN: CPT | Mod: 25 | Performed by: INTERNAL MEDICINE

## 2022-06-27 PROCEDURE — 0064A COVID-19,PF,MODERNA (18+ YRS BOOSTER .25ML): CPT | Performed by: INTERNAL MEDICINE

## 2022-06-27 PROCEDURE — 83036 HEMOGLOBIN GLYCOSYLATED A1C: CPT | Performed by: INTERNAL MEDICINE

## 2022-06-27 RX ORDER — OXYCODONE HYDROCHLORIDE 5 MG/1
5 TABLET ORAL DAILY PRN
Qty: 20 TABLET | Refills: 0 | Status: SHIPPED | OUTPATIENT
Start: 2022-06-27 | End: 2023-01-27

## 2022-06-27 RX ORDER — DULOXETIN HYDROCHLORIDE 60 MG/1
60 CAPSULE, DELAYED RELEASE ORAL DAILY
Qty: 90 CAPSULE | Refills: 1 | Status: SHIPPED | OUTPATIENT
Start: 2022-06-27 | End: 2023-01-31

## 2022-06-27 RX ORDER — LOSARTAN POTASSIUM 50 MG/1
50 TABLET ORAL DAILY
Qty: 90 TABLET | Refills: 1 | Status: SHIPPED | OUTPATIENT
Start: 2022-06-27 | End: 2022-07-03

## 2022-06-27 ASSESSMENT — PAIN SCALES - GENERAL: PAINLEVEL: MILD PAIN (2)

## 2022-06-27 NOTE — PROGRESS NOTES
Assessment & Plan     Moira was seen today for diabetes and imm/inj.    Diagnoses and all orders for this visit:    Type 2 diabetes mellitus with diabetic nephropathy, without long-term current use of insulin (H)  Comments:  Per patient blood sugars are under good control      Orders:  -     Hemoglobin A1c  She was not able to afford Jardiance  It was too expensive  She does not want any expensive medication due to limited budget  Lab Results   Component Value Date    A1C 6.7 06/27/2022    A1C 7.2 03/03/2022    A1C 6.6 08/27/2021    A1C 6.2 08/24/2020    A1C 6.5 01/20/2020    A1C 6.1 09/20/2019    A1C 6.1 06/19/2019    A1C 6.4 03/04/2019     This Jardiance would be very beneficial for her due to her risk factors  This is good for heart good for kidneys and diabetes and weight loss    Stage 3b chronic kidney disease (H)  Comments:  Chronic and stable  Orders:  -     Renal panel (Alb, BUN, Ca, Cl, CO2, Creat, Gluc, Phos, K, Na); Future  -     Renal panel (Alb, BUN, Ca, Cl, CO2, Creat, Gluc, Phos, K, Na)    Other chronic pain  Comments:  Uses oxycodone sparingly and has signed treatment agreement  Orders:  -     oxyCODONE (ROXICODONE) 5 MG tablet; Take 1 tablet (5 mg) by mouth daily as needed for pain    Lumbar radiculopathy  Comments:  She takes oxycodone sparingly  Orders:  -     oxyCODONE (ROXICODONE) 5 MG tablet; Take 1 tablet (5 mg) by mouth daily as needed for pain    Hyperkalemia  Comments:  Improved but will continue to monitor  Orders:  -     Renal panel (Alb, BUN, Ca, Cl, CO2, Creat, Gluc, Phos, K, Na); Future  -     Renal panel (Alb, BUN, Ca, Cl, CO2, Creat, Gluc, Phos, K, Na)    Intermittent asthma, uncomplicated  Comments:  Currently under control    Moderate major depression (H)  -     DULoxetine (CYMBALTA) 60 MG capsule; Take 1 capsule (60 mg) by mouth daily  Duloxetine is helping and she is not as depressed  She goes out helps friends  Go for dinner with friends    Essential hypertension  -      losartan (COZAAR) 50 MG tablet; Take 1 tablet (50 mg) by mouth daily for Blood Pressure  -     Renal panel (Alb, BUN, Ca, Cl, CO2, Creat, Gluc, Phos, K, Na); Future  -     Renal panel (Alb, BUN, Ca, Cl, CO2, Creat, Gluc, Phos, K, Na)  Per patient blood pressure readings are under good control at home but she does not check it frequently  But she is going to do that in the future    Primary osteoarthritis involving multiple joints  Comments:  Patient was seen by rheumatology and they agreed that this is not rheumatoid arthritis and it is osteoarthritis    Urinary frequency  -     UA with Microscopic reflex to Culture - lab collect; Future  -     UA with Microscopic reflex to Culture - lab collect  -     Urine Microscopic    Medication management  -     Urine Drugs of Abuse Screen; Future  -     Urine Drugs of Abuse Screen    ELIGIO on CPAP  Comments:  noncompliant   Per patient she will work on it      High priority for 2019-nCoV vaccine  -     COVID-19,PF,MODERNA (18+ Yrs BOOSTER .25mL)      Stopped jardiance due to cost     See Patient Instructions  Patient Instructions   Covid booster today  Labs today  Oxycodone can be habit-forming. It should be taken as prescribed. Do not mix it  with alcohol. Be careful with driving.Do not loose the  Prescription.  Do not overuse this medication. It is a controlled substance.  Monitor your blood pressure once a week  at home.  Bring those readings on your next visit.  Notify us if your blood pressure readings consistently stays greater than 140/90.  Having high blood pressure puts you at risk for heart attack, stroke, kidney damage, and other serious problems.   High blood pressure doesn't usually cause symptoms, so people sometimes don't take it seriously  The medicines your doctor or nurse prescribes to treat high blood pressure can help reduce the risk of these problems and even help you live longer.    You have a lot of control over your blood pressure. To lower it:  ?Lose  weight (if you are overweight)  ?Choose a diet low in fat and rich in fruits, vegetables, and low-fat dairy products  ?Reduce the amount of salt you eat  ?Do something active for at least 30 minutes a day on most days of the week  ?Cut down on alcohol (if you drink more than 2 alcoholic drinks per day)      Follow up in 4 months  Seek sooner medical attention if there is any worsening of symptoms or problems.           Return in about 4 months (around 10/27/2022) for Diabetes.    Maryan Churchill MD  [facility] FARIDA Simons is a 88 year old, presenting for the following health issues:  Diabetes and Imm/Inj (COVID-19 VACCINE)      HPI     Diabetes Follow-up    How often are you checking your blood sugar? A few times a week  What time of day are you checking your blood sugars (select all that apply)?  Before meals  Have you had any blood sugars above 200?  No  Have you had any blood sugars below 70?  No    What symptoms do you notice when your blood sugar is low?  None and Not applicable    What concerns do you have today about your diabetes? None     Do you have any of these symptoms? (Select all that apply)  No numbness or tingling in feet.  No redness, sores or blisters on feet.  No complaints of excessive thirst.  No reports of blurry vision.  No significant changes to weight.      BP Readings from Last 2 Encounters:   06/27/22 (!) 144/84   05/26/22 (!) 171/81     Hemoglobin A1C POCT (%)   Date Value   08/24/2020 6.2 (H)   01/20/2020 6.5 (H)     Hemoglobin A1C (%)   Date Value   06/27/2022 6.7 (H)   03/03/2022 7.2 (H)     LDL Cholesterol Calculated (mg/dL)   Date Value   08/27/2021 84   08/24/2020 40   01/20/2020 52                 How many servings of fruits and vegetables do you eat daily?  0-1    On average, how many sweetened beverages do you drink each day (Examples: soda, juice, sweet tea, etc.  Do NOT count diet or artificially sweetened beverages)?   2    How many days per  "week do you exercise enough to make your heart beat faster? 3 or less    How many minutes a day do you exercise enough to make your heart beat faster? 9 or less    How many days per week do you miss taking your medication? 0    Home Blood Pressure readings are good   She walked from [employer] so Blood Pressure went up   Helping friend   Feeling better   Knee is causing pain    Review of Systems   Constitutional, HEENT, cardiovascular, pulmonary, gi and gu systems are negative, except as otherwise noted.    takes naps and sleeps good   Objective    BP (!) 144/84   Pulse 76   Temp (!) 95.1  F (35.1  C) (Temporal)   Resp 18   Ht 1.651 m (5' 5\")   Wt 125.7 kg (277 lb 3.2 oz)   SpO2 96%   BMI 46.13 kg/m    Body mass index is 46.13 kg/m .  Physical Exam   She is very nice and pleasant.  She is comfortable and not in any kind of distress.  She is fully alert awake oriented.            Disclaimer: This note consists of symbols derived from keyboarding, dictation and/or voice recognition software. As a result, there may be errors in the script that have gone undetected. Please consider this when interpreting information found in this chart.    .  ..  "

## 2022-06-27 NOTE — LETTER
[place] [address] [address] [facility]  [address]  [place]  [address]  Tel: [phone]    July 5, 2022    Moira Andre  [address] [facility] [address]  [place] [address]        Dear Ms. Andre,    This is to inform you regarding your test result.     I spoke to you on phone but sending you a result note also.   Potassium continue to stay elevated   Kidney function is getting slightly worse   Discontinue losartan due to elevated potassium   Stay well-hydrated   I am replacing losartan with hydralazine 25 twice a day   Get your repeat labs done in 1 week   Make an appointment with nephrologist by calling the following number   [phone]   I have ordered your renal ultrasound for checking your kidneys   Please call radiology by dialing  [phone] to schedule your kidney ultrasound   Urine drug screen is satisfactory   HbA1c which is average glucose during last 3 months is 6.7%.   Your urine shows presence of red blood cells           Sincerely,       Dr.Nasima Kerline MD,FACP/SML         Enclosure: Lab Results  Results for orders placed or performed in visit on 06/27/22   Hemoglobin A1c     Status: Abnormal   Result Value Ref Range    Hemoglobin A1C 6.7 (H) 0.0 - 5.6 %   Renal panel (Alb, BUN, Ca, Cl, CO2, Creat, Gluc, Phos, K, Na)     Status: Abnormal   Result Value Ref Range    Sodium 139 133 - 144 mmol/L    Potassium 5.5 (H) 3.4 - 5.3 mmol/L    Chloride 103 94 - 109 mmol/L    Carbon Dioxide (CO2) 24 20 - 32 mmol/L    Anion Gap 12 3 - 14 mmol/L    Urea Nitrogen 38 (H) 7 - 30 mg/dL    Creatinine 1.50 (H) 0.52 - 1.04 mg/dL    Calcium 9.0 8.5 - 10.1 mg/dL    Glucose 133 (H) 70 - 99 mg/dL    Albumin 3.8 3.4 - 5.0 g/dL    Phosphorus 3.8 2.5 - 4.5 mg/dL    GFR Estimate 33 (L) >60 mL/min/1.73m2   UA with Microscopic reflex to Culture - lab collect     Status: Abnormal    Specimen: Urine, NOS   Result Value Ref Range    Color Urine Yellow Colorless, Straw, Light Yellow, Yellow    Appearance Urine  Clear Clear    Glucose Urine Negative Negative mg/dL    Bilirubin Urine Negative Negative    Ketones Urine Negative Negative mg/dL    Specific Gravity Urine 1.020 1.003 - 1.035    Blood Urine Moderate (A) Negative    pH Urine 5.0 5.0 - 7.0    Protein Albumin Urine 30  (A) Negative mg/dL    Urobilinogen Urine 0.2 0.2, 1.0 E.U./dL    Nitrite Urine Negative Negative    Leukocyte Esterase Urine Trace (A) Negative   Urine Drugs of Abuse Screen Panel 13     Status: Normal   Result Value Ref Range    Cannabinoids (82-prg-6-carboxy-9-THC) Not Detected Not Detected, Indeterminate    Phencyclidine Not Detected Not Detected, Indeterminate    Cocaine (Benzoylecgonine) Not Detected Not Detected, Indeterminate    Methamphetamine (d-Methamphetamine) Not Detected Not Detected, Indeterminate    Opiates (Morphine) Not Detected Not Detected, Indeterminate    Amphetamine (d-Amphetamine) Not Detected Not Detected, Indeterminate    Benzodiazepines (Nordiazepam) Not Detected Not Detected, Indeterminate    Tricyclic Antidepressants (Desipramine) Not Detected Not Detected, Indeterminate    Methadone Not Detected Not Detected, Indeterminate    Barbiturates (Butalbital) Not Detected Not Detected, Indeterminate    Oxycodone Not Detected Not Detected, Indeterminate    Propoxyphene (Norpropoxyphene) Not Detected Not Detected, Indeterminate    Buprenorphine Not Detected Not Detected, Indeterminate   Urine Microscopic     Status: Abnormal   Result Value Ref Range    Bacteria Urine Few (A) None Seen /HPF    RBC Urine 25-50 (A) 0-2 /HPF /HPF    WBC Urine 0-5 0-5 /HPF /HPF    Squamous Epithelials Urine Few (A) None Seen /LPF    Narrative    Urine Culture not indicated   Urine Drugs of Abuse Screen     Status: Normal    Narrative    The following orders were created for panel order Urine Drugs of Abuse Screen.  Procedure                               Abnormality         Status                     ---------                               -----------          ------                     Urine Drugs of Abuse Scr...[[id number]]  Normal              Final result                 Please view results for these tests on the individual orders.

## 2022-06-27 NOTE — PATIENT INSTRUCTIONS
Covid booster today  Labs today  Oxycodone can be habit-forming. It should be taken as prescribed. Do not mix it  with alcohol. Be careful with driving.Do not loose the  Prescription.  Do not overuse this medication. It is a controlled substance.  Monitor your blood pressure once a week  at home.  Bring those readings on your next visit.  Notify us if your blood pressure readings consistently stays greater than 140/90.  Having high blood pressure puts you at risk for heart attack, stroke, kidney damage, and other serious problems.   High blood pressure doesn't usually cause symptoms, so people sometimes don't take it seriously  The medicines your doctor or nurse prescribes to treat high blood pressure can help reduce the risk of these problems and even help you live longer.    You have a lot of control over your blood pressure. To lower it:  ?Lose weight (if you are overweight)  ?Choose a diet low in fat and rich in fruits, vegetables, and low-fat dairy products  ?Reduce the amount of salt you eat  ?Do something active for at least 30 minutes a day on most days of the week  ?Cut down on alcohol (if you drink more than 2 alcoholic drinks per day)      Follow up in 4 months  Seek sooner medical attention if there is any worsening of symptoms or problems.

## 2022-06-28 ENCOUNTER — TELEPHONE (OUTPATIENT)
Dept: FAMILY MEDICINE | Facility: CLINIC | Age: 87
End: 2022-06-28

## 2022-06-28 NOTE — TELEPHONE ENCOUNTER
oxyCODONE (ROXICODONE) 5 MG tablet 20 tablet 0 6/27/2022  No   Sig - Route: Take 1 tablet (5 mg) by mouth daily as needed for pain - Oral     Fax from pharmacy as FYI only    Due to patient's plan requirements, patient is new to opiate therapy (no fills in the last 120 days) should use the shortest possible duration of therapy.  The patient's plan has indicated the patient has not filled an opiod in the past 120 days and therefore we are dispensing a 7-day supply of Oxycodone 5mg.  This fax is for your information only and no response is needed.      Per dispensing report #7 was dispensed on 6-, remainder quantity of original Rx will be void (#13 will be void).    LOV 6- RT Anne (R)

## 2022-06-30 LAB
ALBUMIN SERPL-MCNC: 3.8 G/DL (ref 3.4–5)
ANION GAP SERPL CALCULATED.3IONS-SCNC: 12 MMOL/L (ref 3–14)
BUN SERPL-MCNC: 38 MG/DL (ref 7–30)
CALCIUM SERPL-MCNC: 9 MG/DL (ref 8.5–10.1)
CHLORIDE BLD-SCNC: 103 MMOL/L (ref 94–109)
CO2 SERPL-SCNC: 24 MMOL/L (ref 20–32)
CREAT SERPL-MCNC: 1.5 MG/DL (ref 0.52–1.04)
GFR SERPL CREATININE-BSD FRML MDRD: 33 ML/MIN/1.73M2
GLUCOSE BLD-MCNC: 133 MG/DL (ref 70–99)
PHOSPHATE SERPL-MCNC: 3.8 MG/DL (ref 2.5–4.5)
POTASSIUM BLD-SCNC: 5.5 MMOL/L (ref 3.4–5.3)
SODIUM SERPL-SCNC: 139 MMOL/L (ref 133–144)

## 2022-07-03 ENCOUNTER — TELEPHONE (OUTPATIENT)
Dept: FAMILY MEDICINE | Facility: CLINIC | Age: 87
End: 2022-07-03

## 2022-07-03 DIAGNOSIS — R31.9 HEMATURIA, UNSPECIFIED TYPE: ICD-10-CM

## 2022-07-03 DIAGNOSIS — N18.32 STAGE 3B CHRONIC KIDNEY DISEASE (H): Primary | ICD-10-CM

## 2022-07-03 DIAGNOSIS — I10 ESSENTIAL HYPERTENSION: ICD-10-CM

## 2022-07-03 RX ORDER — HYDRALAZINE HYDROCHLORIDE 25 MG/1
25 TABLET, FILM COATED ORAL 2 TIMES DAILY
Qty: 60 TABLET | Refills: 3 | Status: SHIPPED | OUTPATIENT
Start: 2022-07-03 | End: 2022-07-05

## 2022-07-03 NOTE — RESULT ENCOUNTER NOTE
Please let patient know to schedule repeat potassium testing in one week  I spoke to her about her result but forgot to let her know to repeat lab in one week  No fasting is needed

## 2022-07-03 NOTE — RESULT ENCOUNTER NOTE
Please notify patient by sending following letter with copy of test results      Mert Pizarro,    This is to inform you regarding your test result.    I spoke to you on phone but sending you a result note also.  Potassium continue to stay elevated  Kidney function is getting slightly worse  Discontinue losartan due to elevated potassium  Stay well-hydrated  I am replacing losartan with hydralazine 25 twice a day  Get your repeat labs done in 1 week  Make an appointment with nephrologist by calling the following number  [phone]  I have ordered your renal ultrasound for checking your kidneys  Please call radiology by dialing  [phone] to schedule your kidney ultrasound  Urine drug screen is satisfactory  HbA1c which is average glucose during last 3 months is 6.7%.  Your urine shows presence of red blood cells          Sincerely,      Dr.Nasima Kerline MD,FACP

## 2022-07-08 ENCOUNTER — HOSPITAL ENCOUNTER (OUTPATIENT)
Dept: ULTRASOUND IMAGING | Facility: CLINIC | Age: 87
Discharge: HOME | End: 2022-07-08
Attending: INTERNAL MEDICINE | Admitting: INTERNAL MEDICINE
Payer: COMMERCIAL

## 2022-07-08 DIAGNOSIS — R31.9 HEMATURIA, UNSPECIFIED TYPE: ICD-10-CM

## 2022-07-08 DIAGNOSIS — N18.32 STAGE 3B CHRONIC KIDNEY DISEASE (H): ICD-10-CM

## 2022-07-08 DIAGNOSIS — I10 ESSENTIAL HYPERTENSION: ICD-10-CM

## 2022-07-08 PROCEDURE — 76770 US EXAM ABDO BACK WALL COMP: CPT | Mod: XS

## 2022-07-08 NOTE — LETTER
July 8, 2022      Kristyn Andre  [address]  [place] [address]        Dear ,    We are writing to inform you of your test results.    Mert Pizarro,     This is to inform you regarding your test result.     Your kidney ultrasound result is satisfactory   There is no evidence of renal artery stenosis       Resulted Orders   US Renal Complete w Doppler Complete    Narrative    ULTRASOUND RENAL COMPLETE WITH DOPPLER COMPLETE July 8, 2022 10:46 AM     HISTORY: Hypertension, hematuria.    COMPARISON: Abdominal ultrasound 11/23/2004, CT of the abdomen  8/22/2018.    FINDINGS: Exam is limited secondary to patient not being able to hold  his breath and also patient had heavy breathing.    The right kidney measures 11.2 x 5.5 x 5.4 cm. The right renal cortex  measures 1.2 cm in thickness. There is no hydronephrosis. Renal  cortical echogenicity is unremarkable. Hypoechoic cysts are noted in  the superior pole of the right kidney measuring 4.8 x 4.6 x 4.3 cm,  previously 3.5 cm. There is an anechoic simple cyst in the inferior  pole of the left kidney measuring 3.0 x 3.4 x 3.3 cm, previously 3.4  cm. Typically no specific follow-up is needed for this finding.    Spectral waveform analysis was performed. The peak systolic velocities  in the right renal artery at its hilum, mid and origin are 52, 34 and  59 cm/sec respectively. Low resistance waveforms are identified in the  right renal artery. The resistance indices in the right arcuate  arteries range between 0.63-0.75. RAR ratios range between 0.2 to 0.4.    The left kidney measures 10.9 x 5.5 x 5.4 cm. The left renal cortex  measures 1.4 cm in thickness. There is no hydronephrosis. Renal  cortical echogenicity is unremarkable. Simple cysts are noted in the  left kidney measuring up to 3.6 x 2.5 x 3.0 cm. Typically no specific  follow-up is needed for this finding    Spectral waveform analysis was performed. The peak systolic velocities  in the  left renal artery at its hilum, mid and origin are 117, 101 and  122 cm/sec respectively. Low resistance waveforms are identified in  the left renal artery. The resistance indices in the left arcuate  arteries range between 0.54-0.7. RAR ratios range between 0.7 to 0.8.    The peak systolic velocity in the abdominal aorta superior to the  origin of the renal arteries is 144 cm/s.    The blader is normal.      Impression    IMPRESSION: No evidence of renal artery stenosis.    IVAN COELHO DO         SYSTEM ID:  [id number]       If you have any questions or concerns, please call the clinic at the number listed above.       Sincerely,      Maryna Churchill MD

## 2022-07-08 NOTE — RESULT ENCOUNTER NOTE
Please notify patient by sending following letter with copy of test results      Mert Pizarro,    This is to inform you regarding your test result.    Your kidney ultrasound result is satisfactory  There is no evidence of renal artery stenosis    Sincerely,      Dr.Nasima Kerline MD,FACP

## 2022-07-12 ENCOUNTER — LAB (OUTPATIENT)
Dept: LAB | Facility: CLINIC | Age: 87
End: 2022-07-12
Payer: COMMERCIAL

## 2022-07-12 DIAGNOSIS — R31.9 HEMATURIA, UNSPECIFIED TYPE: ICD-10-CM

## 2022-07-12 DIAGNOSIS — I10 ESSENTIAL HYPERTENSION: ICD-10-CM

## 2022-07-12 DIAGNOSIS — N18.32 STAGE 3B CHRONIC KIDNEY DISEASE (H): ICD-10-CM

## 2022-07-12 LAB
ANION GAP SERPL CALCULATED.3IONS-SCNC: 9 MMOL/L (ref 3–14)
BUN SERPL-MCNC: 28 MG/DL (ref 7–30)
CALCIUM SERPL-MCNC: 9.4 MG/DL (ref 8.5–10.1)
CHLORIDE BLD-SCNC: 112 MMOL/L (ref 94–109)
CO2 SERPL-SCNC: 19 MMOL/L (ref 20–32)
CREAT SERPL-MCNC: 1.33 MG/DL (ref 0.52–1.04)
GFR SERPL CREATININE-BSD FRML MDRD: 38 ML/MIN/1.73M2
GLUCOSE BLD-MCNC: 163 MG/DL (ref 70–99)
POTASSIUM BLD-SCNC: 4.8 MMOL/L (ref 3.4–5.3)
SODIUM SERPL-SCNC: 140 MMOL/L (ref 133–144)

## 2022-07-12 PROCEDURE — 36415 COLL VENOUS BLD VENIPUNCTURE: CPT

## 2022-07-12 PROCEDURE — 80048 BASIC METABOLIC PNL TOTAL CA: CPT

## 2022-07-12 NOTE — LETTER
July 14, 2022      Kristyn RAJI Jes  [address]  [facility] [id number]        Mert Pizarro,     This is to inform you regarding your test result.     Your potassium is normal   There is improvement in kidney function.   Keep your appointment with nephrology as a scheduled     Sincerely,       Dr.Nasima Kerline MD,FACP       Resulted Orders   Basic metabolic panel   Result Value Ref Range    Sodium 140 133 - 144 mmol/L    Potassium 4.8 3.4 - 5.3 mmol/L    Chloride 112 (H) 94 - 109 mmol/L    Carbon Dioxide (CO2) 19 (L) 20 - 32 mmol/L    Anion Gap 9 3 - 14 mmol/L    Urea Nitrogen 28 7 - 30 mg/dL    Creatinine 1.33 (H) 0.52 - 1.04 mg/dL    Calcium 9.4 8.5 - 10.1 mg/dL    Glucose 163 (H) 70 - 99 mg/dL    GFR Estimate 38 (L) >60 mL/min/1.73m2      Comment:      Effective December 21, 2021 eGFRcr in adults is calculated using the 2021 CKD-EPI creatinine equation which includes age and gender (Blayne et al., NEJM, DOI: 10.1056/MTKCie1704128)       simin

## 2022-07-14 NOTE — RESULT ENCOUNTER NOTE
Please notify patient by sending following letter with copy of test results      Mert Pizarro,    This is to inform you regarding your test result.    Your potassium is normal  There is improvement in kidney function.  Keep your appointment with nephrology as a scheduled    Sincerely,      Dr.Nasima Kerline MD,FACP

## 2022-08-22 ENCOUNTER — NURSE TRIAGE (OUTPATIENT)
Dept: NURSING | Facility: CLINIC | Age: 87
End: 2022-08-22

## 2022-08-22 NOTE — TELEPHONE ENCOUNTER
Exposed to someone who is positive for coronavirus. She was with her Friday to the , Lunch afterwards. She wants a covid-19 test. She should get it Tuesday, Wednesday or Thursday, 5-7 days after exposure.  I connected her to schedule that appointment after introducing the call.  Shanice Briscoe RN  [facility]      Reason for Disposition    [1] CLOSE CONTACT COVID-19 EXPOSURE within last 14 days AND [2] NO symptoms    Additional Information    Negative: COVID-19 lab test positive    Negative: [1] Lives with someone known to have influenza (flu test positive) AND [2] flu-like symptoms (e.g., cough, runny nose, sore throat, SOB; with or without fever)    Negative: [1] Symptoms of COVID-19 (e.g., cough, fever, SOB, or others) AND [2] doctor (or NP/PA) diagnosed COVID-19 based on symptoms    Negative: [1] Symptoms of COVID-19 (e.g., cough, fever, SOB, or others) AND [2] lives in an area with community spread    Negative: [1] Symptoms of COVID-19 (e.g., cough, fever, SOB, or others) AND [2] within 14 days of EXPOSURE (close contact) with diagnosed or suspected COVID-19 patient    Negative: [1] Symptoms of COVID-19 (e.g., cough, fever, SOB, or others) AND [2] within 14 days of travel from high-risk area for COVID-19 community spread (identified by CDC)    Negative: [1] Difficulty breathing (shortness of breath) occurs AND [2] onset > 14 days after COVID-19 EXPOSURE (Close Contact)    Negative: [1] Cough occurs AND [2] onset > 14 days after COVID-19 EXPOSURE    Negative: [1] Common cold symptoms AND [2] onset > 14 days after COVID-19 EXPOSURE    Negative: COVID-19 vaccine reaction suspected (e.g., fever, headache, muscle aches) occurring during days 1-3 after getting vaccine    Negative: COVID-19 vaccine, questions about    Negative: [1] CLOSE CONTACT COVID-19 EXPOSURE within last 14 days AND [2] needs COVID-19 lab test to return to work AND [3] NO symptoms    Negative: [1] CLOSE CONTACT COVID-19 EXPOSURE  within last 14 days AND [2] exposed person is a  (e.g., police or paramedic) AND [3] NO symptoms    Negative: [1] CLOSE CONTACT COVID-19 EXPOSURE within last 14 days AND [2] exposed person is a healthcare worker who was NOT using all recommended personal protective equipment (e.g., a respirator-N95 mask, eye protection, gloves, and gown) AND [3] NO symptoms    Negative: [1] Living or working in a correctional facility, long-term care facility, or shelter (i.e., congregate setting; densely populated) AND [2] where an outbreak has occurred AND [3] NO symptoms    Negative: [1] CLOSE CONTACT COVID-19 EXPOSURE within last 14 days AND [2] weak immune system (e.g., HIV positive, cancer chemo, splenectomy, organ transplant, chronic steroids) AND [3] NO symptoms    Protocols used: CORONAVIRUS (COVID-19) EXPOSURE-A-OH

## 2022-08-24 ENCOUNTER — LAB (OUTPATIENT)
Dept: URGENT CARE | Facility: URGENT CARE | Age: 87
End: 2022-08-24
Payer: COMMERCIAL

## 2022-08-24 DIAGNOSIS — Z20.822 CLOSE EXPOSURE TO 2019 NOVEL CORONAVIRUS: ICD-10-CM

## 2022-08-24 LAB — SARS-COV-2 RNA RESP QL NAA+PROBE: NEGATIVE

## 2022-08-24 PROCEDURE — U0005 INFEC AGEN DETEC AMPLI PROBE: HCPCS

## 2022-08-24 PROCEDURE — U0003 INFECTIOUS AGENT DETECTION BY NUCLEIC ACID (DNA OR RNA); SEVERE ACUTE RESPIRATORY SYNDROME CORONAVIRUS 2 (SARS-COV-2) (CORONAVIRUS DISEASE [COVID-19]), AMPLIFIED PROBE TECHNIQUE, MAKING USE OF HIGH THROUGHPUT TECHNOLOGIES AS DESCRIBED BY CMS-2020-01-R: HCPCS

## 2022-09-06 DIAGNOSIS — N18.32 STAGE 3B CHRONIC KIDNEY DISEASE (H): Primary | ICD-10-CM

## 2022-09-14 ENCOUNTER — LAB (OUTPATIENT)
Dept: LAB | Facility: CLINIC | Age: 87
End: 2022-09-14
Payer: COMMERCIAL

## 2022-09-14 ENCOUNTER — OFFICE VISIT (OUTPATIENT)
Dept: NEPHROLOGY | Facility: CLINIC | Age: 87
End: 2022-09-14
Attending: INTERNAL MEDICINE
Payer: COMMERCIAL

## 2022-09-14 VITALS
BODY MASS INDEX: 47.4 KG/M2 | SYSTOLIC BLOOD PRESSURE: 143 MMHG | DIASTOLIC BLOOD PRESSURE: 75 MMHG | OXYGEN SATURATION: 95 % | HEIGHT: 65 IN | HEART RATE: 79 BPM | WEIGHT: 284.5 LBS

## 2022-09-14 DIAGNOSIS — N18.32 STAGE 3B CHRONIC KIDNEY DISEASE (H): ICD-10-CM

## 2022-09-14 LAB
HGB BLD-MCNC: 12.9 G/DL (ref 11.7–15.7)
PTH-INTACT SERPL-MCNC: 89 PG/ML (ref 15–65)

## 2022-09-14 PROCEDURE — 85018 HEMOGLOBIN: CPT

## 2022-09-14 PROCEDURE — 80069 RENAL FUNCTION PANEL: CPT

## 2022-09-14 PROCEDURE — 83970 ASSAY OF PARATHORMONE: CPT

## 2022-09-14 PROCEDURE — 36415 COLL VENOUS BLD VENIPUNCTURE: CPT

## 2022-09-14 PROCEDURE — 82043 UR ALBUMIN QUANTITATIVE: CPT

## 2022-09-14 PROCEDURE — 99204 OFFICE O/P NEW MOD 45 MIN: CPT | Performed by: INTERNAL MEDICINE

## 2022-09-14 PROCEDURE — 82306 VITAMIN D 25 HYDROXY: CPT

## 2022-09-14 ASSESSMENT — PAIN SCALES - GENERAL: PAINLEVEL: NO PAIN (0)

## 2022-09-14 NOTE — PROGRESS NOTES
Nephrology Clinic    Moira Andre MRN:[id number] YOB: 1933  Date of Service: 09/14/2022  Primary care provider: Maryan Churchill  Requesting physician: Maryan Churchill      REASON FOR CONSULT:     HISTORY OF PRESENT ILLNESS:   Moira Andre is a 88 year old female who presents for evaluation of CKD stage 3.  The past medical history is significant for longstanding type 2 DM, HTN and severe obesity with a BMI at 47.34 kg/m2 and CKD with a baseline creatinine around 1.3 mg/dL. She has had CKD since at least 2018 with creatinine values around 1.2-1.3 mg/dL . A UACR done on 09/01/2021 is 259.75 mg/g Cr.    A renal ultrasound done on 7/8/2022 shows:  FINDINGS: Exam is limited secondary to patient not being able to hold  his breath and also patient had heavy breathing.     The right kidney measures 11.2 x 5.5 x 5.4 cm. The right renal cortex  measures 1.2 cm in thickness. There is no hydronephrosis. Renal  cortical echogenicity is unremarkable. Hypoechoic cysts are noted in  the superior pole of the right kidney measuring 4.8 x 4.6 x 4.3 cm,  previously 3.5 cm. There is an anechoic simple cyst in the inferior  pole of the left kidney measuring 3.0 x 3.4 x 3.3 cm, previously 3.4  cm. Typically no specific follow-up is needed for this finding.     Spectral waveform analysis was performed. The peak systolic velocities  in the right renal artery at its hilum, mid and origin are 52, 34 and  59 cm/sec respectively. Low resistance waveforms are identified in the  right renal artery. The resistance indices in the right arcuate  arteries range between 0.63-0.75. RAR ratios range between 0.2 to 0.4.     The left kidney measures 10.9 x 5.5 x 5.4 cm. The left renal cortex  measures 1.4 cm in thickness. There is no hydronephrosis. Renal  cortical echogenicity is unremarkable. Simple cysts are noted in the  left kidney measuring up to 3.6 x 2.5 x 3.0 cm. Typically no specific  follow-up is needed for this  finding     Spectral waveform analysis was performed. The peak systolic velocities  in the left renal artery at its hilum, mid and origin are 117, 101 and  122 cm/sec respectively. Low resistance waveforms are identified in  the left renal artery. The resistance indices in the left arcuate  arteries range between 0.54-0.7. RAR ratios range between 0.7 to 0.8.     The peak systolic velocity in the abdominal aorta superior to the  origin of the renal arteries is 144 cm/s.     The bladder is normal.  The patient denies any dysuria, any pollakiuria, any nocturia, any LE edema, any dyspnea on exertion .    The patient's glycemia has been well controlled with an Hba1c at 6.7 in June 2022. The patient's blood pressure has also been fairly well controlled on amlodipine 5 mg daily, metoprolol succinate 100 mg daily and hydralazine 25 mg twice daily.     The patient has chronic generalized pain for which she has been taking acetaminophen and oxycodone. She denies any NSAIDs use. Of note, she was started on ibandronate 150 mg daily in April 2022.  PAST MEDICAL HISTORY:  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     Myocardial infarction (H) 9/2007, cath 2013 ml    BMS: stent to OM, diag, nl EF, echo /C angia 2013 , f/u cath no lesion >40%     Nephrolithiasis     right side     OA (osteoarthritis) of knee      Obesity      Rheumatoid arthritis flare (H)     prednisone     Sleep apnea     restarted using cpap  2017     TIA (transient ischaemic attack)      Ventral hernia, unspecified, without mention of obstruction or gangrene      PAST SURGICAL HISTORY:  Past Surgical History:   Procedure Laterality Date     APPENDECTOMY       BIOPSY BREAST      x2 -needle & lumpectomy-benign     CHOLECYSTECTOMY       CORONARY ANGIOGRAPHY ADULT ORDER  9/28/2007    Bare metal stent to OM1, Diagonal patent      CORONARY ANGIOGRAPHY ADULT ORDER  9/25/2007    [place] stent to Diagonal     HC LEFT HEART CATHETERIZATION  8/2013    Moderate CAD     HYSTERECTOMY TOTAL ABDOMINAL       ORTHOPEDIC SURGERY      knee replacement on right side (2006), Left side (2016)     RELEASE CARPAL TUNNEL      right and left     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     MEDICATIONS:  Prescription Medications as of 9/14/2022       Rx Number Disp Refills Start End Last Dispensed Date Next Fill Date Owning Pharmacy    acetaminophen (TYLENOL) 500 MG tablet     2/19/2021        Sig: Take 2 tablets (1,000 mg) by mouth 3 times daily    Class: Transitional    Route: Oral    albuterol (PROAIR HFA/PROVENTIL HFA/VENTOLIN HFA) 108 (90 Base) MCG/ACT inhaler  54 g 1 8/27/2021    [id number] [place] [address] [address] AT [address]    Sig: INHALE 2 PUFFS INTO THE LUNGS EVERY 6 HOURS FOR SHORTNESS OF BREATH    Class: E-Prescribe    Notes to Pharmacy: **Patient requests 90 days supply**    Route: Inhalation    amLODIPine (NORVASC) 5 MG tablet  90 tablet 3 8/27/2021    [id number] [address] AT [address]    Sig: Take 1 tablet (5 mg) by mouth daily for Blood Pressure    Class: E-Prescribe    Route: Oral    atorvastatin (LIPITOR) 20 MG tablet  90 tablet 3 3/3/2022    [facility][id number] [facility] [address] AT [address]    Sig: Take 1 tablet (20 mg) by mouth daily for cholestrol    Class: Local Print    Route: Oral    blood glucose  (TRUE METRIX BLOOD GLUCOSE TEST) test strip  100 strip 1 2021    [facility] (Now [facility]) - [address]    Si strips by In Vitro route daily Use to test blood sugar once times daily or as directed.    Class: E-Prescribe    Route: In Vitro    Blood Glucose Calibration (TRUE METRIX LEVEL 1) Low SOLN  1 each 3 2021    [facility] (Now [facility]) - [address]    Si each daily as needed (to calibrate blood glucose machine)    Class: E-Prescribe    Route: Does not apply    blood glucose monitoring (NO BRAND SPECIFIED) meter device kit  1 kit 0 6/10/2016    [facility]    Sig: Use to test blood sugar 1-2 times daily or as directed.    Class: Fax    Notes to Pharmacy: [facility] True Metrix Air Meter    Blood Glucose Monitoring Suppl (TRUE METRIX AIR GLUCOSE METER) w/Device KIT  1 kit 0 2021    [facility] (Now [facility]) - [address]    Si Units See Admin Instructions    Class: E-Prescribe    Route: Does not apply    buPROPion (WELLBUTRIN SR) 100 MG 12 hr tablet  90 tablet 3 2022    [facility] - [place], [place] - [address]    Sig: Take 1 tablet (100 mg) by mouth daily for mood    Class: E-Prescribe    Route: Oral    Cholecalciferol (VITAMIN D) 2000 UNITS CAPS            Sig: Take 2,000 Units by mouth daily     Class: Historical    Route: Oral    clopidogrel (PLAVIX) 75 MG tablet  90 tablet 2 2022    [facility]  ([facility] - [place], [place] - [address]    Sig: Take 1 tablet (75 mg) by mouth daily    Class: E-Prescribe    Route: Oral    DULoxetine (CYMBALTA) 60 MG capsule  90 capsule 1 2022    [facility]  ([facility] - [place], [place] - [address]    Sig: Take 1 capsule (60  mg) by mouth daily    Class: E-Prescribe    Route: Oral    fluticasone (FLONASE) 50 MCG/ACT nasal spray            Sig: Spray 1 spray into both nostrils daily as needed     Class: Historical    Route: Both Nostrils    fluticasone-salmeterol (ADVAIR) 100-50 MCG/DOSE inhaler  60 each 4 4/26/2022    [facility] [address] [facility]    Sig: Inhale 1 puff into the lungs every 12 hours    Class: E-Prescribe    Route: Inhalation    fluticasone-salmeterol (ADVAIR) 100-50 MCG/DOSE inhaler  60 each 3 11/23/2021    [facility] ([facility]) - [place] [address] [address]    Sig: INHALE 1 PUFF EVERY 12 HOURS    Class: E-Prescribe    glimepiride (AMARYL) 2 MG tablet  100 tablet 2 8/18/2022    [facility]  ([facility]) - [address] [facility]    Sig: TAKE 1 TABLET BY MOUTH IN  THE MORNING BEFORE  BREAKFAST FOR DIABETES    Class: E-Prescribe    Notes to Pharmacy: Requesting 1 year supply    hydrALAZINE (APRESOLINE) 25 MG tablet  180 tablet 0 7/5/2022    [id number] - [place], [place] - [address] AT [facility]    Sig: TAKE 1 TABLET(25 MG) BY MOUTH TWICE DAILY FOR BLOOD PRESSURE. DISCONTINUE LOSARTAN DUE TO HIGH POTASSIUM    Class: E-Prescribe    Notes to Pharmacy: **Patient requests 90 days supply**    IBANdronate (BONIVA) 150 MG tablet  3 tablet 3 4/26/2022    [facility] - [address] [facility]    Sig: TAKE 1 TABLET EVERY 30 DAYS FOR OSTEOPENIA    Class: E-Prescribe    Lidocaine (LIDOCARE) 4 % Patch    2/19/2021        Sig: Place 1 patch onto the skin every 24 hours To prevent lidocaine toxicity, patient should be patch free for 12 hrs daily.    Class: Transitional    Route: Transdermal    Menthol, Topical Analgesic, (ICY HOT EX)            Sig: Apply to affected area every morning    Class: Historical    metoprolol  succinate ER (TOPROL-XL) 100 MG 24 hr tablet  90 tablet 2 3/3/2022    [id number] - [place], [place] - [address] AT [address]    Sig: Take 1 tablet (100 mg) by mouth daily    Class: Local Print    Route: Oral    nitroGLYcerin (NITROSTAT) 0.4 MG sublingual tablet  25 tablet 0 3/3/2022    [id number] [place], [place] - [address] AT [address]    Sig: For chest pain place 1 tablet under the tongue every 5 minutes for 3 doses. If symptoms persist 5 minutes after 1st dose call 911.    Class: Local Print    order for DME  1 each 0 8/15/2019    [id number]  [place] - [address] AT [address] [place] [address]    Sig: Equipment being ordered: diabetic shoes    Class: Local Print    order for DME            Sig: DREAMSTATION  5-15 CM/H20  NASAL WISP FABRIC     Class: Historical    oxyCODONE (ROXICODONE) 5 MG tablet  20 tablet 0 2022    [facility] - [place], [place] - [address]    Sig: Take 1 tablet (5 mg) by mouth daily as needed for pain    Class: E-Prescribe    Earliest Fill Date: 2022    Route: Oral    pantoprazole (PROTONIX) 20 MG EC tablet  90 tablet 1 3/2/2022    [id number] [address] AT [address]    Sig: Take 1 tablet (20 mg) by mouth daily    Class: E-Prescribe    Route: Oral    polyethylene glycol (MIRALAX) 17 g packet    2021        Sig: Take 17 g by mouth daily    Class: Transitional    Route: Oral    triamcinolone (KENALOG) 0.1 % external cream  85 g 1 2021    [id number] [place], [place] - [address] AT [address]    Sig: Apply topically 2 times daily    Class: E-Prescribe    Route: Topical    TRUEplus Lancets 33G MISC  100 each 0 2021    [facility] (Now [facility]) - [place], [place] - [address]    Si  each 2 times daily    Class: E-Prescribe    Route: Does not apply         ALLERGIES:    Allergies   Allergen Reactions     Aspirin Hives     Reaction occurred during childhood.      Lisinopril Cough     Losartan      Hyperkalemia       Metformin      Elevated lactic acid     Minocycline      Yellow Dye Allergy. Minocycline has Yellow Dye #10.     Salicylates Hives     Yellow Dye Hives     Rxn to yellow tablet. Eyes swelled shut.      Yellow Dyes Hives     REVIEW OF SYSTEMS:  Review Of Systems  Skin: positive for dark spots  Eyes: negative for, visual blurring, double vision, glaucoma, cataracts  Ears/Nose/Throat: negative for, nasal congestion, sneezing, postnasal drainage, hearing loss, deafness  Respiratory: No shortness of breath, dyspnea on exertion, cough, or hemoptysis  Cardiovascular: negative for, palpitations, tachycardia, irregular heart beat and chest pain  Gastrointestinal: negative for, poor appetite, dysphagia, nausea and vomiting  Genitourinary: negative for, nocturia, dysuria, frequency, urgency and hesitancy  Musculoskeletal: positive for generalized pain  Neurologic: negative for, syncope, stroke, seizures and paralysis    A comprehensive review of systems was performed and found to be negative except as described here or above.  SOCIAL HISTORY:   Social History     Socioeconomic History     Marital status:      Spouse name: Not on file     Number of children: Not on file     Years of education: Not on file     Highest education level: Not on file   Occupational History     Not on file   Tobacco Use     Smoking status: Former Smoker     Packs/day: 0.25     Years: 1.00     Pack years: 0.25     Types: Cigarettes     Start date:      Quit date: 1973     Years since quittin.4     Smokeless tobacco: Never Used   Substance and Sexual Activity     Alcohol use: Yes     Alcohol/week: 0.0 - 1.0 standard drinks     Comment: rarely     Drug use: No     Sexual activity: Not Currently      "Partners: Male   Other Topics Concern     Parent/sibling w/ CABG, MI or angioplasty before 65F 55M? Not Asked      Service Not Asked     Blood Transfusions Not Asked     Caffeine Concern No     Occupational Exposure Not Asked     Hobby Hazards Not Asked     Sleep Concern No     Stress Concern No     Weight Concern No     Special Diet No     Back Care Not Asked     Exercise Yes     Comment: walking, swimming x2 a week     Bike Helmet Not Asked     Seat Belt Yes     Self-Exams Not Asked   Social History Narrative    , 1 step son    Work- clown for profession        Tobacco- none,smoked for couple months in 1970's    ETOH- occ 1/2 months    Diet coke- 2-3 cans/day    Exercise- swims 8 laps -3/week             Social Determinants of Health     Financial Resource Strain: Not on file   Food Insecurity: No Food Insecurity     Worried About Running Out of Food in the Last Year: Never true     Ran Out of Food in the Last Year: Never true   Transportation Needs: No Transportation Needs     Lack of Transportation (Medical): No     Lack of Transportation (Non-Medical): No   Physical Activity: Not on file   Stress: Not on file   Social Connections: Not on file   Intimate Partner Violence: Not on file   Housing Stability: Not on file     FAMILY MEDICAL HISTORY:   Family History   Problem Relation Age of Onset     Neurologic Disorder Mother         MS - at 60's     C.A.D. Father          at 8o's, ? prostate ca     Breast Cancer No family hx of      Cancer - colorectal No family hx of      PHYSICAL EXAM:   BP (!) 143/75 (BP Location: Left arm, Patient Position: Sitting, Cuff Size: Adult Large)   Pulse 79   Ht 1.651 m (5' 5\")   Wt 129 kg (284 lb 8 oz)   SpO2 95%   BMI 47.34 kg/m    GENERAL APPEARANCE: alert and no distress  EYES: nonicteric  HENT: mouth without ulcers or lesions  NECK: supple, no adenopathy  RESP: lungs clear to auscultation   CV: regular rhythm, normal rate, no rub  ABDOMEN: soft, " nontender, normal bowel sounds, no HSM   Extremities: no clubbing, cyanosis, or edema  MS: no evidence of inflammation in joints, no muscle tenderness  SKIN: no rash  NEURO: mentation intact and speech normal  PSYCH: affect normal/bright   LABS:   Recent Results (from the past 672 hour(s))   Asymptomatic COVID-19 Virus (Coronavirus) by PCR Nose    Collection Time: 08/24/22  8:25 AM    Specimen: Nose; Swab   Result Value Ref Range    SARS CoV2 PCR Negative Negative   Hemoglobin    Collection Time: 09/14/22  1:56 PM   Result Value Ref Range    Hemoglobin 12.9 11.7 - 15.7 g/dL     CMP  Recent Labs   Lab Test 07/12/22  0948 06/27/22  1131 05/04/22  1041 04/26/22  1133 03/03/22  1208 02/16/22  1601 08/27/21  1136 02/18/21  0600 02/17/21  1758 08/24/20  0951 01/20/20  1111 09/20/19  1323 11/01/18  0345 10/31/18  1755    139 139 138 135   < > 137 136 137 138 137 137   < > 138   POTASSIUM 4.8 5.5* 4.7 5.4* 4.9   < > 4.5 4.2 4.1 4.6 4.6 4.4   < > 4.2   CHLORIDE 112* 103 109 106 108   < > 108 108 106 107 108 108   < > 106   CO2 19* 24 24 28 22   < > 21 22 24 21 21 22   < > 23   ANIONGAP 9 12 6 4 5   < > 8 6 7 9 8 7   < > 9   * 133* 186* 78 167*   < > 127* 136* 82 120* 135* 61*   < > 136*   BUN 28 38* 34* 28 34*   < > 26 34* 38* 21 26 35*   < > 21   CR 1.33* 1.50* 1.45* 1.40* 1.63*   < > 1.41* 1.50* 1.69* 1.50* 1.37* 1.45*   < > 1.32*   GFRESTIMATED 38* 33* 35* 36* 30*   < > 34* 31* 27* 31* 35* 33*   < > 38*   GFRESTBLACK  --   --   --   --   --   --   --  36* 31* 36* 40* 38*   < > 46*   TELLY 9.4 9.0 9.2 9.2 9.1   < > 9.3 8.4* 9.4 9.1 9.3 8.9   < > 8.8   PHOS  --  3.8  --  4.1 3.6  --   --   --   --   --  3.8  --   --   --    PROTTOTAL  --   --   --   --   --   --  7.7  --   --  7.4  --  7.2  --  7.5   ALBUMIN  --  3.8  --  3.9 3.8  --  3.6  --   --  3.2* 3.6 3.8  --  3.5   BILITOTAL  --   --   --   --   --   --  0.4  --   --  0.3  --  0.2  --  0.3   ALKPHOS  --   --   --   --   --   --  70  --   --  69  --  67  --   64   AST  --   --   --   --   --   --  16  --   --  12  --  9  --  11   ALT  --   --   --   --   --   --  17  --   --  13  --  16  --  16    < > = values in this interval not displayed.     CBC  Recent Labs   Lab Test 09/14/22  1356 03/03/22  1208 02/16/22  1750 02/16/22  1629 08/27/21  1136 02/18/21  0600   HGB 12.9 14.1 14.3  --  14.2 11.9   WBC  --  10.9 10.7 9.1 9.1 10.3   RBC  --  5.12 5.42*  --  5.37* 4.57   HCT  --  44.3 46.4  --  44.2 38.6   MCV  --  87 86  --  82 85   MCH  --  27.5 26.4*  --  26.4* 26.0*   MCHC  --  31.8 30.8*  --  32.1 30.8*   RDW  --  16.4* 15.6*  --  16.3* 14.5   PLT  --  296 320  --  315 309     INR  Recent Labs   Lab Test 07/19/17  0655 09/21/16  1300   INR 0.93 0.99   PTT 29  --      ABGNo lab results found.   URINE STUDIES  Recent Labs   Lab Test 06/27/22  1131 09/01/21  1339 02/18/21  2030 08/22/18  2227 07/04/18  0918 01/15/16  1127   COLOR Yellow Yellow Yellow Straw   < > Yellow   APPEARANCE Clear Clear Slightly Cloudy Clear   < > Clear   URINEGLC Negative Negative Negative Negative   < > Negative   URINEBILI Negative Negative Negative Negative   < > Negative   URINEKETONE Negative Negative Negative Negative   < > Negative   SG 1.020 >=1.030 1.021 1.025   < > 1.015   UBLD Moderate* Trace* Large* Moderate*   < > Negative   URINEPH 5.0 5.5 5.5 5.0   < > 5.0   PROTEIN 30 * 100 * 10* 10*   < > Negative   UROBILINOGEN 0.2 0.2  --   --   --  0.2   NITRITE Negative Negative Positive* Negative   < > Negative   LEUKEST Trace* Small* Small* Negative   < > Negative   RBCU 25-50* 0-2 15* 7*   < > O - 2   WBCU 0-5 10-25* 4 1   < > O - 2    < > = values in this interval not displayed.     No lab results found.    ASSESSMENT AND PLAN:   #CKD stage 3b  eGFR around 33-35 mL/min  With tubular range   proteinuria and a UACR around 2549 mg/g  A renal ultrasound done in July 2022 shows acquired bilateral cysts only  Her CKD has been failrly stable and the potential responsible factors include   Longstanding type 2 DM, HTN, acquired cystic disease, severe obesity and normal decline related to age  No documented intake of NSAIDs however she was recently started on ibandronate and her renal function will need to be monitored on that   The patient was instructed to keep the sodium intake around 2400 mg /day, follow a plant-based diet and to avoid NSAIDs     #HTN  Primary and secondary to CKD. Well controlled on amlodipine 5 mg daily, metoprolol succinate 100 mg daily and hydralazine 25 mg twice daily. She should ideally also be on an ACE or ARB however given her advanced age and co-morbidities will defer that    #Type 2 DM   Hba1c 6.7 well controlled    #CVD/dyslipidemia  On a statin as indicated for any patient with CKD above the age of 50 and well controlled     #Blood count  Hemoglobin 12.9  No need for any additional work up or intervention    #Acid-base status  CO2 level 19 in July 2022. If low again today will start her on oral bicarbonate therapy    #Electrolytes  Na 140  K 4.8  No acute issues     #BMD  Ca   9       P  3.8   Alb 3.8  iPTH and Vitamin D level pending    #CKD journey/transplant not a candidate yet     The total time of this encounter amounted to 60 minutes. This time included time spent with the patient, reviewing records, ordering tests, and performing post visit documentation.   Labs ordered include: CBC with diff, Renal Panel, CMP, UA with microscopy, UPCR, UACR, vitamin D levels, iPTH levels    The patient will return to follow up in     Lynnette Parks MD  Division of Renal Disease and Hypertension  September 14, 2022  2:56 PM

## 2022-09-14 NOTE — PATIENT INSTRUCTIONS
It was a pleasure taking care of you today.  I've included a brief summary of our discussion and care plan from today's visit below.  Please review this information with your primary care provider.    My recommendations are summarized as follows:  -Keep the amount of sodium in your diet at 2.4 g/day (also see instructions attached in that regard)  -Keep a Blood Pressure diary by taking your blood pressure twice a day as instructed (also see instructions attached in that regard).Please make sure that you are using a validated blood pressure device by checking that it is the case at: https://www.validatebp.org/  -Avoid all NSAID's. Examples include Ibuprofen (Advil, Motrin), naprosyn (Aleve), celebrex among others. Acetaminophen (Tylenol) is ok with maximum dose in 24 hours of 3200 mg.  -Do not exceed one alcoholic drink per day.  -If for any reason, you are at risk of volume depletion/dehydration (high grade fevers, severe vomiting or diarrhea) stop .... till you get better    - Return to Nephrology Clinic in 6 months to review your progress.     To schedule imaging please call [phone]. To schedule your lab appointment at [facility] 1st floor lab call [phone]    Who do I call with any questions after my visit?  Please be in touch if there are any further questions that arise following today's visit.  There are multiple ways to contact your nephrology care team.      During business hours, you may reach your Nephrology RN Care Coordinator, Michelle Carbone at . To schedule or reschedule an appointment, please call [phone].    You can always send a secure message through [employer].  [employer] messages are answered by your nurse or doctor typically within 24 hours.  Please allow extra time on weekends and holidays.      For urgent/emergent questions after business hours, you may reach the on-call Nephrology Fellow by contacting the [facility] at [phone].     How  will I get the results of any tests ordered?    You will receive all of your results.  If you have signed up for [employer], any tests ordered at your visit will be available to you after your physician reviews them.  Typically this takes 1-2 weeks.  If there are urgent results that require a change in your care plan, your physician or nurse will call you to discuss the next steps.      What is [employer]?  [employer] is a secure way for you to access all of your healthcare records from the [facility].  It is a web based computer program, so you can sign on to it from any location.  It also allows you to send secure messages to your care team.  I recommend signing up for [employer] access if you have not already done so and are comfortable with using a computer.      How do I schedule a follow-up visit?  If you did not schedule a follow-up visit today, please call [phone] to schedule a follow-up office visit.      Sincerely,    Lynnette Parks MD  [facility]  Division of Nephrology and Hypertension

## 2022-09-14 NOTE — NURSING NOTE
"Chief Complaint   Patient presents with     New Patient     Stage 3b chronic kidney disease (H)       Vitals:    09/14/22 1450   BP: (!) 143/75   BP Location: Left arm   Patient Position: Sitting   Cuff Size: Adult Large   Pulse: 79   SpO2: 95%   Weight: 129 kg (284 lb 8 oz)   Height: 1.651 m (5' 5\")       Body mass index is 47.34 kg/m .    Jolly Whittaker, VERONICAF      "

## 2022-09-15 LAB
ALBUMIN SERPL-MCNC: 3.4 G/DL (ref 3.4–5)
ANION GAP SERPL CALCULATED.3IONS-SCNC: 7 MMOL/L (ref 3–14)
BUN SERPL-MCNC: 33 MG/DL (ref 7–30)
CALCIUM SERPL-MCNC: 9 MG/DL (ref 8.5–10.1)
CHLORIDE BLD-SCNC: 106 MMOL/L (ref 94–109)
CO2 SERPL-SCNC: 24 MMOL/L (ref 20–32)
CREAT SERPL-MCNC: 1.52 MG/DL (ref 0.52–1.04)
CREAT UR-MCNC: 162 MG/DL
DEPRECATED CALCIDIOL+CALCIFEROL SERPL-MC: 39 UG/L (ref 20–75)
GFR SERPL CREATININE-BSD FRML MDRD: 33 ML/MIN/1.73M2
GLUCOSE BLD-MCNC: 129 MG/DL (ref 70–99)
MICROALBUMIN UR-MCNC: 610 MG/L
MICROALBUMIN/CREAT UR: 376.54 MG/G CR (ref 0–25)
PHOSPHATE SERPL-MCNC: 3.7 MG/DL (ref 2.5–4.5)
POTASSIUM BLD-SCNC: 4.7 MMOL/L (ref 3.4–5.3)
SODIUM SERPL-SCNC: 137 MMOL/L (ref 133–144)

## 2022-10-14 DIAGNOSIS — R21 RASH: ICD-10-CM

## 2022-10-14 RX ORDER — TRIAMCINOLONE ACETONIDE 1 MG/G
CREAM TOPICAL 2 TIMES DAILY
Qty: 85 G | Refills: 1 | Status: ON HOLD | OUTPATIENT
Start: 2022-10-14 | End: 2023-11-29

## 2022-10-21 ENCOUNTER — VIRTUAL VISIT (OUTPATIENT)
Dept: FAMILY MEDICINE | Facility: CLINIC | Age: 87
End: 2022-10-21
Payer: COMMERCIAL

## 2022-10-21 DIAGNOSIS — R53.83 TIREDNESS: Primary | ICD-10-CM

## 2022-10-21 DIAGNOSIS — E66.2 OBESITY HYPOVENTILATION SYNDROME (H): ICD-10-CM

## 2022-10-21 DIAGNOSIS — E11.21 TYPE 2 DIABETES MELLITUS WITH DIABETIC NEPHROPATHY, WITHOUT LONG-TERM CURRENT USE OF INSULIN (H): ICD-10-CM

## 2022-10-21 DIAGNOSIS — N18.32 STAGE 3B CHRONIC KIDNEY DISEASE (H): ICD-10-CM

## 2022-10-21 DIAGNOSIS — G47.33 OSA (OBSTRUCTIVE SLEEP APNEA): ICD-10-CM

## 2022-10-21 DIAGNOSIS — E66.01 MORBID OBESITY (H): ICD-10-CM

## 2022-10-21 PROCEDURE — 99442 PR PHYSICIAN TELEPHONE EVALUATION 11-20 MIN: CPT | Performed by: INTERNAL MEDICINE

## 2022-10-21 ASSESSMENT — PATIENT HEALTH QUESTIONNAIRE - PHQ9: SUM OF ALL RESPONSES TO PHQ QUESTIONS 1-9: 0

## 2022-10-21 NOTE — PROGRESS NOTES
Kristyn is a 89 year old who is being evaluated via a billable telephone visit.      What phone number would you like to be contacted at?   How would you like to obtain your AVS? Mail a copy    Assessment & Plan     Moira was seen today for insomnia.    Diagnoses and all orders for this visit:    Patient mention that she made an appointment few days ago and those symptoms have improved already    Tiredness  Patient says that she feels tired and fatigued easily  When she walks she gets short of breath very easily  She understands she is morbidly obese  Trying her best but not able to lose weight  The medication which can help her to lose weight are expensive for her  She could not afford Jardiance  She has sleep apnea but not using the CPAP machine as it was recalled  I discussed the importance of using CPAP machine  Tiredness fatigue and daytime drowsiness could be related to her sleep apnea  Taking care of sleep apnea is very important  She said she would give them a call and find out about that    Obesity hypoventilation syndrome (H)  This also affects her breathing    Type 2 diabetes mellitus with diabetic nephropathy, without long-term current use of insulin (H)  Lab Results   Component Value Date    A1C 6.7 06/27/2022    A1C 7.2 03/03/2022    A1C 6.6 08/27/2021    A1C 6.2 08/24/2020    A1C 6.5 01/20/2020    A1C 6.1 09/20/2019    A1C 6.1 06/19/2019    A1C 6.4 03/04/2019     Stage 3b chronic kidney disease (H)  Patient has seen nephrologist  Note was reviewed    ELIGIO (obstructive sleep apnea)  Noncompliant due to the fact that CPAP machine was recalled    Morbid obesity (H)  Having difficulty losing weight  Ozempic and Jardiance would be a good option to lose weight but unable to afford      I advised the patient to come and see me in person so we can do the lab work to rule out any other reason for her shortness of breath  Most likely has obesity hypoventilation syndrome and morbid obesity as well as noncompliance  "with CPAP machine       BMI:   Estimated body mass index is 47.34 kg/m  as calculated from the following:    Height as of 9/14/22: 1.651 m (5' 5\").    Weight as of 9/14/22: 129 kg (284 lb 8 oz).       See Patient Instructions  Patient Instructions   Start using your CPAP machine  Make appointment for office visit   Seek sooner medical attention if there is any worsening of symptoms or problems.       Return in about 2 weeks (around 11/4/2022) for wellness visit.    Maryan Churchill MD  [facility] FARIDA Simons is a 89 year old, presenting for the following health issues:  Insomnia      HPI     Patient reported she gets tired very easily  Few days ago she went for shopping and it was so difficult for her  But she came home she was exhausted and fell asleep  She is unsteady sometimes and try to use walker all the time but is still she had few mechanical falls      Review of Systems   Constitutional, HEENT, cardiovascular, pulmonary, GI, , musculoskeletal, neuro, skin, endocrine and psych systems are negative, except as otherwise noted.      Objective           Vitals:  No vitals were obtained today due to virtual visit.    Physical Exam     PSYCH: Alert and oriented times 3; coherent speech, normal   rate and volume, able to articulate logical thoughts, able   to abstract reason, no tangential thoughts, no hallucinations   or delusions  Her affect is normal  RESP: No cough, no audible wheezing, able to talk in full sentences  Remainder of exam unable to be completed due to telephone visits        Disclaimer: This note consists of symbols derived from keyboarding, dictation and/or voice recognition software. As a result, there may be errors in the script that have gone undetected. Please consider this when interpreting information found in this chart.          Phone call duration: 15 minutes    "

## 2022-10-21 NOTE — PATIENT INSTRUCTIONS
Start using your CPAP machine  Make appointment for office visit   Seek sooner medical attention if there is any worsening of symptoms or problems.

## 2022-10-24 ENCOUNTER — OFFICE VISIT (OUTPATIENT)
Dept: FAMILY MEDICINE | Facility: CLINIC | Age: 87
End: 2022-10-24
Payer: COMMERCIAL

## 2022-10-24 VITALS
OXYGEN SATURATION: 96 % | SYSTOLIC BLOOD PRESSURE: 130 MMHG | BODY MASS INDEX: 47.98 KG/M2 | RESPIRATION RATE: 20 BRPM | HEART RATE: 74 BPM | DIASTOLIC BLOOD PRESSURE: 70 MMHG | HEIGHT: 65 IN | WEIGHT: 288 LBS | TEMPERATURE: 98.9 F

## 2022-10-24 DIAGNOSIS — N18.32 STAGE 3B CHRONIC KIDNEY DISEASE (H): ICD-10-CM

## 2022-10-24 DIAGNOSIS — Z00.00 ENCOUNTER FOR MEDICARE ANNUAL WELLNESS EXAM: Primary | ICD-10-CM

## 2022-10-24 DIAGNOSIS — K43.9 VENTRAL HERNIA WITHOUT OBSTRUCTION OR GANGRENE: ICD-10-CM

## 2022-10-24 DIAGNOSIS — I10 ESSENTIAL HYPERTENSION: ICD-10-CM

## 2022-10-24 DIAGNOSIS — E66.2 OBESITY HYPOVENTILATION SYNDROME (H): ICD-10-CM

## 2022-10-24 DIAGNOSIS — E11.21 TYPE 2 DIABETES MELLITUS WITH DIABETIC NEPHROPATHY, WITHOUT LONG-TERM CURRENT USE OF INSULIN (H): ICD-10-CM

## 2022-10-24 DIAGNOSIS — E66.01 MORBID OBESITY (H): ICD-10-CM

## 2022-10-24 DIAGNOSIS — Z23 NEED FOR PROPHYLACTIC VACCINATION AND INOCULATION AGAINST INFLUENZA: ICD-10-CM

## 2022-10-24 DIAGNOSIS — F32.1 MODERATE MAJOR DEPRESSION (H): ICD-10-CM

## 2022-10-24 DIAGNOSIS — M54.16 LUMBAR RADICULOPATHY: ICD-10-CM

## 2022-10-24 DIAGNOSIS — Z23 HIGH PRIORITY FOR 2019-NCOV VACCINE: ICD-10-CM

## 2022-10-24 DIAGNOSIS — G47.33 OSA (OBSTRUCTIVE SLEEP APNEA): ICD-10-CM

## 2022-10-24 LAB
ERYTHROCYTE [DISTWIDTH] IN BLOOD BY AUTOMATED COUNT: 14.5 % (ref 10–15)
HBA1C MFR BLD: 6.8 % (ref 0–5.6)
HCT VFR BLD AUTO: 42.7 % (ref 35–47)
HGB BLD-MCNC: 13.2 G/DL (ref 11.7–15.7)
MCH RBC QN AUTO: 25.9 PG (ref 26.5–33)
MCHC RBC AUTO-ENTMCNC: 30.9 G/DL (ref 31.5–36.5)
MCV RBC AUTO: 84 FL (ref 78–100)
PLATELET # BLD AUTO: 338 10E3/UL (ref 150–450)
RBC # BLD AUTO: 5.09 10E6/UL (ref 3.8–5.2)
WBC # BLD AUTO: 9.9 10E3/UL (ref 4–11)

## 2022-10-24 PROCEDURE — 84443 ASSAY THYROID STIM HORMONE: CPT | Performed by: INTERNAL MEDICINE

## 2022-10-24 PROCEDURE — G0008 ADMIN INFLUENZA VIRUS VAC: HCPCS | Mod: 59 | Performed by: INTERNAL MEDICINE

## 2022-10-24 PROCEDURE — 99214 OFFICE O/P EST MOD 30 MIN: CPT | Mod: 25 | Performed by: INTERNAL MEDICINE

## 2022-10-24 PROCEDURE — 80061 LIPID PANEL: CPT | Performed by: INTERNAL MEDICINE

## 2022-10-24 PROCEDURE — G0439 PPPS, SUBSEQ VISIT: HCPCS | Performed by: INTERNAL MEDICINE

## 2022-10-24 PROCEDURE — 0134A COVID-19,PF,MODERNA BIVALENT: CPT | Performed by: INTERNAL MEDICINE

## 2022-10-24 PROCEDURE — 85027 COMPLETE CBC AUTOMATED: CPT | Performed by: INTERNAL MEDICINE

## 2022-10-24 PROCEDURE — 90662 IIV NO PRSV INCREASED AG IM: CPT | Performed by: INTERNAL MEDICINE

## 2022-10-24 PROCEDURE — 36415 COLL VENOUS BLD VENIPUNCTURE: CPT | Performed by: INTERNAL MEDICINE

## 2022-10-24 PROCEDURE — 80069 RENAL FUNCTION PANEL: CPT | Performed by: INTERNAL MEDICINE

## 2022-10-24 PROCEDURE — 83036 HEMOGLOBIN GLYCOSYLATED A1C: CPT | Performed by: INTERNAL MEDICINE

## 2022-10-24 PROCEDURE — 99207 PR FOOT EXAM NO CHARGE: CPT | Performed by: INTERNAL MEDICINE

## 2022-10-24 PROCEDURE — 91313 COVID-19,PF,MODERNA BIVALENT: CPT | Performed by: INTERNAL MEDICINE

## 2022-10-24 ASSESSMENT — ENCOUNTER SYMPTOMS
WEAKNESS: 1
JOINT SWELLING: 1
HEMATOCHEZIA: 0
EYE PAIN: 0
HEARTBURN: 0
HEMATURIA: 0
CHILLS: 0
FEVER: 0
NAUSEA: 0
SORE THROAT: 0
SHORTNESS OF BREATH: 1
ABDOMINAL PAIN: 0
DIARRHEA: 0
BREAST MASS: 0
NERVOUS/ANXIOUS: 0
CONSTIPATION: 0
DYSURIA: 0
PARESTHESIAS: 0
HEADACHES: 0
DIZZINESS: 0
MYALGIAS: 1
FREQUENCY: 1
ARTHRALGIAS: 1
COUGH: 1
PALPITATIONS: 0

## 2022-10-24 ASSESSMENT — ASTHMA QUESTIONNAIRES
ACT_TOTALSCORE: 21
QUESTION_3 LAST FOUR WEEKS HOW OFTEN DID YOUR ASTHMA SYMPTOMS (WHEEZING, COUGHING, SHORTNESS OF BREATH, CHEST TIGHTNESS OR PAIN) WAKE YOU UP AT NIGHT OR EARLIER THAN USUAL IN THE MORNING: NOT AT ALL
QUESTION_2 LAST FOUR WEEKS HOW OFTEN HAVE YOU HAD SHORTNESS OF BREATH: THREE TO SIX TIMES A WEEK
QUESTION_1 LAST FOUR WEEKS HOW MUCH OF THE TIME DID YOUR ASTHMA KEEP YOU FROM GETTING AS MUCH DONE AT WORK, SCHOOL OR AT HOME: NONE OF THE TIME
QUESTION_4 LAST FOUR WEEKS HOW OFTEN HAVE YOU USED YOUR RESCUE INHALER OR NEBULIZER MEDICATION (SUCH AS ALBUTEROL): ONCE A WEEK OR LESS
ACT_TOTALSCORE: 21
QUESTION_5 LAST FOUR WEEKS HOW WOULD YOU RATE YOUR ASTHMA CONTROL: WELL CONTROLLED

## 2022-10-24 ASSESSMENT — PAIN SCALES - GENERAL: PAINLEVEL: NO PAIN (0)

## 2022-10-24 ASSESSMENT — ACTIVITIES OF DAILY LIVING (ADL): CURRENT_FUNCTION: NO ASSISTANCE NEEDED

## 2022-10-24 NOTE — PROGRESS NOTES
She is at risk for lack of exercise and has been provided with information to increase physical activity for the benefit of her well-being.  The patient was counseled and encouraged to consider modifying their diet and eating habits. She was provided with information on recommended healthy diet options.  Information on urinary incontinence and treatment options given to patient.  She is at risk for falling and has been provided with information to reduce the risk of falling at home.

## 2022-10-24 NOTE — PATIENT INSTRUCTIONS
Labs today  Check with your insurance company about ozempic for diabetes and weight loss  Follow up in 4 months  Seek sooner medical attention if there is any worsening of symptoms or problems.        Patient Education   Personalized Prevention Plan  You are due for the preventive services outlined below.  Your care team is available to assist you in scheduling these services.  If you have already completed any of these items, please share that information with your care team to update in your medical record.  Health Maintenance Due   Topic Date Due    Zoster (Shingles) Vaccine (1 of 2) Never done    Diptheria Tetanus Pertussis (DTAP/TDAP/TD) Vaccine (2 - Td or Tdap) 06/14/2022    Eye Exam  08/01/2022    COVID-19 Vaccine (5 - Booster for Moderna series) 08/22/2022    Cholesterol Lab  08/27/2022    Diabetic Foot Exam  08/27/2022    ANNUAL REVIEW OF HM ORDERS  08/27/2022    Flu Vaccine (1) 09/01/2022     Preventive Health Recommendations    See your health care provider every year to  Review health changes.   Discuss preventive care.    Review your medicines if your doctor has prescribed any.  You no longer need a yearly Pap test unless you've had an abnormal Pap test in the past 10 years. If you have vaginal symptoms, such as bleeding or discharge, be sure to talk with your provider about a Pap test.  Every 1 to 2 years, have a mammogram.  If you are over 69, talk with your health care provider about whether or not you want to continue having screening mammograms.  Every 10 years, have a colonoscopy. Or, have a yearly FIT test (stool test). These exams will check for colon cancer.   Have a cholesterol test every 5 years, or more often if your doctor advises it.   Have a diabetes test (fasting glucose) every three years. If you are at risk for diabetes, you should have this test more often.   At age 65, have a bone density scan (DEXA) to check for osteoporosis (brittle bone disease).    Shots:  Get a flu shot each  year.  Get a tetanus shot every 10 years.  Talk to your doctor about your pneumonia vaccines. There are now two you should receive - Pneumovax (PPSV 23) and Prevnar (PCV 13).  Talk to your pharmacist about the shingles vaccine.  Talk to your doctor about the hepatitis B vaccine.    Nutrition:   Eat at least 5 servings of fruits and vegetables each day.  Eat whole-grain bread, whole-wheat pasta and brown rice instead of white grains and rice.  Get adequate Calcium and Vitamin D.     Lifestyle  Exercise at least 150 minutes a week (30 minutes a day, 5 days a week). This will help you control your weight and prevent disease.  Limit alcohol to one drink per day.  No smoking.   Wear sunscreen to prevent skin cancer.   See your dentist twice a year for an exam and cleaning.  See your eye doctor every 1 to 2 years to screen for conditions such as glaucoma, macular degeneration and cataracts.    Personalized Prevention Plan  You are due for the preventive services outlined below.  Your care team is available to assist you in scheduling these services.  If you have already completed any of these items, please share that information with your care team to update in your medical record.  Health Maintenance   Topic Date Due    ZOSTER IMMUNIZATION (1 of 2) Never done    DTAP/TDAP/TD IMMUNIZATION (2 - Td or Tdap) 06/14/2022    EYE EXAM  08/01/2022    COVID-19 Vaccine (5 - Booster for Moderna series) 08/22/2022    LIPID  08/27/2022    DIABETIC FOOT EXAM  08/27/2022    ANNUAL REVIEW OF HM ORDERS  08/27/2022    INFLUENZA VACCINE (1) 09/01/2022    A1C  12/27/2022    ASTHMA ACTION PLAN  03/03/2023    PHQ-9  04/21/2023    ASTHMA CONTROL TEST  04/24/2023    URINE DRUG SCREEN  06/27/2023    BMP  09/14/2023    MICROALBUMIN  09/14/2023    HEMOGLOBIN  09/14/2023    MEDICARE ANNUAL WELLNESS VISIT  10/24/2023    FALL RISK ASSESSMENT  10/24/2023    ADVANCE CARE PLANNING  10/24/2027    DEPRESSION ACTION PLAN  Completed    Pneumococcal Vaccine:  65+ Years  Completed    URINALYSIS  Completed    IPV IMMUNIZATION  Aged Out    MENINGITIS IMMUNIZATION  Aged Out       Exercise for a Healthier Heart  You may wonder how you can improve the health of your heart. If you re thinking about exercise, you re on the right track. You don t need to become an athlete. But you do need a certain amount of brisk exercise to help strengthen your heart. If you have been diagnosed with a heart condition, your healthcare provider may advise exercise to help stabilize your condition. To help make exercise a habit, choose safe, fun activities.      Exercise with a friend. When activity is fun, you're more likely to stick with it.   Before you start  Check with your healthcare provider before starting an exercise program. This is especially important if you have not been active for a while. It's also important if you have a long-term (chronic) health problem such as heart disease, diabetes, or obesity. Or if you are at high risk for having these problems.   Why exercise?  Exercising regularly offers many healthy rewards. It can help you do all of the following:   Improve your blood cholesterol level to help prevent further heart trouble  Lower your blood pressure to help prevent a stroke or heart attack  Control diabetes, or reduce your risk of getting this disease  Improve your heart and lung function  Reach and stay at a healthy weight  Make your muscles stronger so you can stay active  Prevent falls and fractures by slowing the loss of bone mass (osteoporosis)  Manage stress better  Reduce your blood pressure  Improve your sense of self and your body image  Exercise tips    Ease into your routine. Set small goals. Then build on them. If you are not sure what your activity level should be, talk with your healthcare provider first before starting an exercise routine.  Exercise on most days. Aim for a total of 150 minutes (2 hours and 30 minutes) or more of moderate-intensity aerobic  activity each week. Or 75 minutes (1 hour and 15 minutes) or more of vigorous-intensity aerobic activity each week. Or try for a combination of both. Moderate activity means that you breathe heavier and your heart rate increases but you can still talk. Think about doing 40 minutes of moderate exercise, 3 to 4 times a week. For best results, activity should last for about 40 minutes to lower blood pressure and cholesterol. It's OK to work up to the 40-minute period over time. Examples of moderate-intensity activity are walking 1 mile in 15 minutes. Or doing 30 to 45 minutes of yard work.  Step up your daily activity level.  Along with your exercise program, try being more active the whole day. Walk instead of drive. Or park further away so that you take more steps each day. Do more household tasks or yard work. You may not be able to meet the advised mount of physical activity. But doing some moderate- or vigorous-intensity aerobic activity can help reduce your risk for heart disease. Your healthcare provider can help you figure out what is best for you.  Choose 1 or more activities you enjoy.  Walking is one of the easiest things you can do. You can also try swimming, riding a bike, dancing, or taking an exercise class.    When to call your healthcare provider  Call your healthcare provider if you have any of these:   Chest pain or feel dizzy or lightheaded  Burning, tightness, pressure, or heaviness in your chest, neck, shoulders, back, or arms  Abnormal shortness of breath  More joint or muscle pain  A very fast or irregular heartbeat (palpitations)  [employer] last reviewed this educational content on 7/1/2019 2000-2021 [employer]. All rights reserved. This information is not intended as a substitute for professional medical care. Always follow your healthcare professional's instructions.          Understanding USDA MyPlate  The USDA has guidelines to help you make healthy food choices. These are  called MyPlate. MyPlate shows the food groups that make up healthy meals using the image of a place setting. Before you eat, think about the healthiest choices for what to put on your plate or in your cup or bowl. To learn more about building a healthy plate, visit www.choosemyplate.gov.    The food groups  Fruits. Any fruit or 100% fruit juice counts as part of the Fruit Group. Fruits may be fresh, canned, frozen, or dried, and may be whole, cut-up, or pureed. Make 1/2 of your plate fruits and vegetables.  Vegetables. Any vegetable or 100% vegetable juice counts as a member of the Vegetable Group. Vegetables may be fresh, frozen, canned, or dried. They can be served raw or cooked and may be whole, cut-up, or mashed. Make 1/2 of your plate fruits and vegetables.  Grains. All foods made from grains are part of the Grains Group. These include wheat, rice, oats, cornmeal, and barley. Grains are often used to make foods such as bread, pasta, oatmeal, cereal, tortillas, and grits. Grains should be no more than 1/4 of your plate. At least half of your grains should be whole grains.  Protein. This group includes meat, poultry, seafood, beans and peas, eggs, processed soy products (such as tofu), nuts (including nut butters), and seeds. Make protein choices no more than 1/4 of your plate. Meat and poultry choices should be lean or low fat.  Dairy. The Dairy Group includes all fluid milk products and foods made from milk that contain calcium, such as yogurt and cheese. (Foods that have little calcium, such as cream, butter, and cream cheese, are not part of this group.) Most dairy choices should be low-fat or fat-free.  Oils. Oils aren't a food group, but they do contain essential nutrients. However it's important to watch your intake of oils. These are fats that are liquid at room temperature. They include canola, corn, olive, soybean, vegetable, and sunflower oil. Foods that are mainly oil include mayonnaise, certain salad  dressings, and soft margarines. You likely already get your daily oil allowance from the foods you eat.  Things to limit  Eating healthy also means limiting these things in your diet:     Salt (sodium). Many processed foods have a lot of sodium. To keep sodium intake down, eat fresh vegetables, meats, poultry, and seafood when possible. Purchase low-sodium, reduced-sodium, or no-salt-added food products at the store. And don't add salt to your meals at home. Instead, season them with herbs and spices such as dill, oregano, cumin, and paprika. Or try adding flavor with lemon or lime zest and juice.  Saturated fat. Saturated fats are most often found in animal products such as beef, pork, and chicken. They are often solid at room temperature, such as butter. To reduce your saturated fat intake, choose leaner cuts of meat and poultry. And try healthier cooking methods such as grilling, broiling, roasting, or baking. For a simple lower-fat swap, use plain nonfat yogurt instead of mayonnaise when making potato salad or macaroni salad.  Added sugars. These are sugars added to foods. They are in foods such as ice cream, candy, soda, fruit drinks, sports drinks, energy drinks, cookies, pastries, jams, and syrups. Cut down on added sugars by sharing sweet treats with a family member or friend. You can also choose fruit for dessert, and drink water or other unsweetened beverages.     [employer] last reviewed this educational content on 6/1/2020 2000-2021 [employer]. All rights reserved. This information is not intended as a substitute for professional medical care. Always follow your healthcare professional's instructions.          Urinary Incontinence, Female (Adult)   Urinary incontinence means loss of bladder control. This problem affects many women, especially as they get older. If you have incontinence, you may be embarrassed to ask for help. But know that this problem can be treated.   Types of  Incontinence  There are different types of incontinence. Two of the main types are described here. You can have more than one type.   Stress incontinence. With this type, urine leaks when pressure (stress) is put on the bladder. This may happen when you cough, sneeze, or laugh. Stress incontinence most often occurs because the pelvic floor muscles that support the bladder and urethra are weak. This can happen after pregnancy and vaginal childbirth or a hysterectomy. It can also be due to excess body weight or hormone changes.  Urge incontinence (also called overactive bladder). With this type, a sudden urge to urinate is felt often. This may happen even though there may not be much urine in the bladder. The need to urinate often during the night is common. Urge incontinence most often occurs because of bladder spasms. This may be due to bladder irritation or infection. Damage to bladder nerves or pelvic muscles, constipation, and certain medicines can also lead to urge incontinence.  Treatment depends on the cause. Further evaluation is needed to find the type you have. This will likely include an exam and certain tests. Based on the results, you and your healthcare provider can then plan treatment. Until a diagnosis is made, the home care tips below can help ease symptoms.   Home care  Do pelvic floor muscle exercises, if they are prescribed. The pelvic floor muscles help support the bladder and urethra. Many women find that their symptoms improve when doing special exercises that strengthen these muscles. To do the exercises, contract the muscles you would use to stop your stream of urine. But do this when you re not urinating. Hold for 10 seconds, then relax. Repeat 10 to 20 times in a row, at least 3 times a day. Your healthcare provider may give you other instructions for how to do the exercises and how often.  Keep a bladder diary. This helps track how often and how much you urinate over a set period of time.  Bring this diary with you to your next visit with the provider. The information can help your provider learn more about your bladder problem.  Lose weight, if advised to by your provider. Extra weight puts pressure on the bladder. Your provider can help you create a weight-loss plan that s right for you. This may include exercising more and making certain diet changes.  Don't have foods and drinks that may irritate the bladder. These can include alcohol and caffeinated drinks.  Quit smoking. Smoking and other tobacco use can lead to a long-term (chronic) cough that strains the pelvic floor muscles. Smoking may also damage the bladder and urethra. Talk with your provider about treatments or methods you can use to quit smoking.  If drinking large amounts of fluid makes you have symptoms, you may be advised to limit your fluid intake. You may also be advised to drink most of your fluids during the day and to limit fluids at night.  If you re worried about urine leakage or accidents, you may wear absorbent pads to catch urine. Change the pads often. This helps reduce discomfort. It may also reduce the risk of skin or bladder infections.    Follow-up care  Follow up with your healthcare provider, or as directed. It may take some to find the right treatment for your problem. But healthy lifestyle changes can be made right away. These include such things as exercising on a regular basis, eating a healthy diet, losing weight (if needed), and quitting smoking. Your treatment plan may include special therapies or medicines. Certain procedures or surgery may also be options. Talk about any questions you have with your provider.   When to seek medical advice  Call the healthcare provider right away if any of these occur:  Fever of 100.4 F (38 C) or higher, or as directed by your provider  Bladder pain or fullness  Belly swelling  Nausea or vomiting  Back pain  Weakness, dizziness, or fainting  [employer] last reviewed this  educational content on 1/1/2020 2000-2021 [employer]. All rights reserved. This information is not intended as a substitute for professional medical care. Always follow your healthcare professional's instructions.          Preventing Falls at Home  A person can fall for many reasons. Older adults may fall because reaction time slows down as we age. Your muscles and joints may get stiff, weak, or less flexible because of illness, medicines, or a physical condition.   Other health problems that make falls more likely include:   Arthritis  Dizziness or lightheadedness when you stand up (orthostatic hypotension)  History of a stroke  Dizziness  Anemia  Certain medicines taken for mental illness or to control blood pressure.  Problems with balance or gait  Bladder or urinary problems  History of falling  Changes in vision (vision impairment)  Changes in thinking skills and memory (cognitive impairment)  Injuries from a fall can include serious injuries such as broken bones, dislocated joints, internal bleeding and cuts. Injuries like these can limit your independence.   Prevention tips  To help prevent falls and fall-related injuries, follow the tips below.    Floors  To make floors safer:   Put nonskid pads under area rugs.  Remove small rugs.  Replace worn floor coverings.  Tack carpets firmly to each step on carpeted stairs. Put nonskid strips on the edges of uncarpeted stairs.  Keep floors and stairs free of clutter and cords.  Arrange furniture so there are clear pathways.  Clean up any spills right away.  Bathrooms    To make bathrooms safer:   Install grab bars in the tub or shower.  Apply nonskid strips or put a nonskid rubber mat in the tub or shower.  Sit on a bath chair to bathe.  Use bathmats with nonskid backing.  Lighting  To improve visibility in your home:    Keep a flashlight in each room. Or put a lamp next to the bed within easy reach.  Put nightlights in the bedrooms, hallways,  kitchen, and bathrooms.  Make sure all stairways have good lighting.  Take your time when going up and down stairs.  Put handrails on both sides of stairs and in walkways for more support. To prevent injury to your wrist or arm, don t use handrails to pull yourself up.  Install grab bars to pull yourself up.  Move or rearrange items that you use often. This will make them easier to find or reach.  Look at your home to find any safety hazards. Especially look at doorways, walkways, and the driveway. Remove or repair any safety problems that you find.  Other changes to make  Look around to find any safety hazards. Look closely at doorways, walkways, and the driveway. Remove or repair any safety problems that you find.  Wear shoes that fit well.  Take your time when going up and down stairs.  Put handrails on both sides of stairs and in walkways for more support. To prevent injury to your wrist or arm, don t use handrails to pull yourself up.  Install grab bars wherever needed to pull yourself up.  Arrange items that you use often. This will make them easier to find or reach.    [employer] last reviewed this educational content on 3/1/2020    7701-4198 [employer]. All rights reserved. This information is not intended as a substitute for professional medical care. Always follow your healthcare professional's instructions.

## 2022-10-24 NOTE — LETTER
[place] [address] [address]  [address]  [place]  [address]  Tel: [phone]    October 27, 2022    Moira Andre  [address]  [facility] [id number]        Dear Ms. Andre,    This is to inform you regarding your test result.     TSH which is thyroid hormone is normal.   Your total cholesterol is normal.   HDL which is called good cholesterol is low   Your LDL cholesterol is normal.  This is often call bad cholesterol and high levels increase the risk for heart attacks and strokes.   The triglycerides are high. Lowering  the amount of sugar ,alcohol and sweets in the diet helps to control this.Exercise and weight loss helps.   Chronic kidney disease is stable   Stay well hydrated .   HbA1c which is average glucose during last 3 months is 6.8%.           Sincerely,       Dr.Nasima Kerline MD,FACP        Enclosure: Lab Results/SML     Results for orders placed or performed in visit on 10/24/22   Hemoglobin A1c     Status: Abnormal   Result Value Ref Range    Hemoglobin A1C 6.8 (H) 0.0 - 5.6 %   TSH with free T4 reflex     Status: Normal   Result Value Ref Range    TSH 1.98 0.40 - 4.00 mU/L   CBC with platelets     Status: Abnormal   Result Value Ref Range    WBC Count 9.9 4.0 - 11.0 10e3/uL    RBC Count 5.09 3.80 - 5.20 10e6/uL    Hemoglobin 13.2 11.7 - 15.7 g/dL    Hematocrit 42.7 35.0 - 47.0 %    MCV 84 78 - 100 fL    MCH 25.9 (L) 26.5 - 33.0 pg    MCHC 30.9 (L) 31.5 - 36.5 g/dL    RDW 14.5 10.0 - 15.0 %    Platelet Count 338 150 - 450 10e3/uL   Renal panel (Alb, BUN, Ca, Cl, CO2, Creat, Gluc, Phos, K, Na)     Status: Abnormal   Result Value Ref Range    Sodium 140 133 - 144 mmol/L    Potassium 4.9 3.4 - 5.3 mmol/L    Chloride 109 94 - 109 mmol/L    Carbon Dioxide (CO2) 27 20 - 32 mmol/L    Anion Gap 4 3 - 14 mmol/L    Urea Nitrogen 38 (H) 7 - 30 mg/dL    Creatinine 1.59 (H) 0.52 - 1.04 mg/dL    Calcium 9.0 8.5 - 10.1 mg/dL    Glucose 80 70 - 99 mg/dL    Albumin 3.7 3.4 -  5.0 g/dL    Phosphorus 3.4 2.5 - 4.5 mg/dL    GFR Estimate 31 (L) >60 mL/min/1.73m2   Lipid panel reflex to direct LDL Non-fasting     Status: Abnormal   Result Value Ref Range    Cholesterol 145 <200 mg/dL    Triglycerides 246 (H) <150 mg/dL    Direct Measure HDL 44 (L) >=50 mg/dL    LDL Cholesterol Calculated 52 <=100 mg/dL    Non HDL Cholesterol 101 <130 mg/dL    Patient Fasting > 8hrs? No     Narrative    Cholesterol  Desirable:  <200 mg/dL    Triglycerides  Normal:  Less than 150 mg/dL  Borderline High:  150-199 mg/dL  High:  200-499 mg/dL  Very High:  Greater than or equal to 500 mg/dL    Direct Measure HDL  Female:  Greater than or equal to 50 mg/dL   Male:  Greater than or equal to 40 mg/dL    LDL Cholesterol  Desirable:  <100mg/dL  Above Desirable:  100-129 mg/dL   Borderline High:  130-159 mg/dL   High:  160-189 mg/dL   Very High:  >= 190 mg/dL    Non HDL Cholesterol  Desirable:  130 mg/dL  Above Desirable:  130-159 mg/dL  Borderline High:  160-189 mg/dL  High:  190-219 mg/dL  Very High:  Greater than or equal to 220 mg/dL

## 2022-10-24 NOTE — PROGRESS NOTES
"SUBJECTIVE:   Kristyn is a 89 year old who presents for Preventive Visit.      Patient has been advised of split billing requirements and indicates understanding: Yes  Are you in the first 12 months of your Medicare coverage?  No    Healthy Habits:     In general, how would you rate your overall health?  Good    Frequency of exercise:  None    Do you usually eat at least 4 servings of fruit and vegetables a day, include whole grains    & fiber and avoid regularly eating high fat or \"junk\" foods?  No    Taking medications regularly:  Yes    Medication side effects:  Not applicable    Ability to successfully perform activities of daily living:  No assistance needed    Home Safety:  No safety concerns identified    Hearing Impairment:  No hearing concerns    In the past 6 months, have you been bothered by leaking of urine? Yes    In general, how would you rate your overall mental or emotional health?  Good      PHQ-2 Total Score: 2    Additional concerns today:  No    Do you feel safe in your environment? Yes    Have you ever done Advance Care Planning? (For example, a Health Directive, POLST, or a discussion with a medical provider or your loved ones about your wishes): Yes, patient states has an Advance Care Planning document and will bring a copy to the clinic.       Fall risk  Fallen 2 or more times in the past year?: Yes  Any fall with injury in the past year?: No    Cognitive Screening   1) Repeat 3 items (Leader, Season, Table)    2) Clock draw: NORMAL  3) 3 item recall: Recalls 1 object   Results: NORMAL clock, 1-2 items recalled: COGNITIVE IMPAIRMENT LESS LIKELY    Mini-CogTM Copyright SATISH Cortez. Licensed by the author for use in [facility]; reprinted with permission ([email or web address]). All rights reserved.      Do you have sleep apnea, excessive snoring or daytime drowsiness?: yes    Reviewed and updated as needed this visit by clinical staff   Tobacco  Allergies  Meds              Reviewed and " updated as needed this visit by Provider                 Social History     Tobacco Use     Smoking status: Former     Packs/day: 0.25     Years: 1.00     Pack years: 0.25     Types: Cigarettes     Start date:      Quit date: 1973     Years since quittin.5     Smokeless tobacco: Never   Substance Use Topics     Alcohol use: Yes     Alcohol/week: 0.0 - 1.0 standard drinks     Comment: rarely     If you drink alcohol do you typically have >3 drinks per day or >7 drinks per week? Not applicable    Alcohol Use 10/24/2022   Prescreen: >3 drinks/day or >7 drinks/week? Not Applicable   Prescreen: >3 drinks/day or >7 drinks/week? -           Current providers sharing in care for this patient include:   Patient Care Team:  Maryan Churchill MD as PCP - General (Internal Medicine)  Maryan Churchill MD as Assigned PCP  Leighton Patel MD as Assigned Rheumatology Provider  Jessica Medrano MD as MD (Nephrology)  Lynnette Parks MD as Assigned Nephrology Provider    The following health maintenance items are reviewed in Epic and correct as of today:  Health Maintenance   Topic Date Due     DTAP/TDAP/TD IMMUNIZATION (2 - Td or Tdap) 2022     EYE EXAM  2022     LIPID  2022     ZOSTER IMMUNIZATION (1 of 2) 2023 (Originally 1983)     ASTHMA ACTION PLAN  2023     PHQ-9  2023     A1C  2023     ASTHMA CONTROL TEST  2023     URINE DRUG SCREEN  2023     BMP  2023     MICROALBUMIN  2023     MEDICARE ANNUAL WELLNESS VISIT  10/24/2023     DIABETIC FOOT EXAM  10/24/2023     ANNUAL REVIEW OF HM ORDERS  10/24/2023     FALL RISK ASSESSMENT  10/24/2023     HEMOGLOBIN  10/24/2023     ADVANCE CARE PLANNING  10/24/2027     DEPRESSION ACTION PLAN  Completed     INFLUENZA VACCINE  Completed     Pneumococcal Vaccine: 65+ Years  Completed     URINALYSIS  Completed     COVID-19 Vaccine  Completed     IPV IMMUNIZATION  Aged Out     MENINGITIS IMMUNIZATION   "Aged Out       Pertinent mammograms are reviewed under the imaging tab.    Review of Systems   Constitutional: Negative for chills and fever.   HENT: Negative for congestion, ear pain, hearing loss and sore throat.    Eyes: Negative for pain and visual disturbance.   Respiratory: Positive for cough and shortness of breath.    Cardiovascular: Positive for peripheral edema. Negative for chest pain and palpitations.   Gastrointestinal: Negative for abdominal pain, constipation, diarrhea, heartburn, hematochezia and nausea.   Breasts:  Negative for tenderness, breast mass and discharge.   Genitourinary: Positive for frequency. Negative for dysuria, genital sores, hematuria, pelvic pain, urgency, vaginal bleeding and vaginal discharge.   Musculoskeletal: Positive for arthralgias, joint swelling and myalgias.   Skin: Negative for rash.   Neurological: Positive for weakness. Negative for dizziness, headaches and paresthesias.   Psychiatric/Behavioral: Negative for mood changes. The patient is not nervous/anxious.      Patient says using CPAP machine has helped   The shortness of breath she was getting has improved  She is a still able to move around and drive  She is helping some neighbors also for her right  She understands she is morbidly obese and  89 years old  OBJECTIVE:   /70   Pulse 74   Temp 98.9  F (37.2  C) (Oral)   Resp 20   Ht 1.651 m (5' 5\")   Wt 130.6 kg (288 lb)   SpO2 96%   BMI 47.93 kg/m   Estimated body mass index is 47.93 kg/m  as calculated from the following:    Height as of this encounter: 1.651 m (5' 5\").    Weight as of this encounter: 130.6 kg (288 lb).  Physical Exam  GENERAL: healthy, alert and no distress  She is morbidly obese and uses walker to walk  EYES: Eyes grossly normal to inspection, PERRL and conjunctivae and sclerae normal  HENT: ear canals and TM's normal, nose and mouth without ulcers or lesions  NECK: no adenopathy,   RESP: lungs clear to auscultation - no rales, " rhonchi or wheezes  BREAST: normal without masses, tenderness or nipple discharge and no palpable axillary masses or adenopathy  CV: regular rate and rhythm, normal S1 S2, no S3   ABDOMEN: soft, nontender, no hepatosplenomegaly, no masses and bowel sounds normal  She has a very big ventral hernia  MS: She has a scar of her knee surgery  She has changes in her joints due to osteoarthritis  SKIN: no suspicious lesions or rashes  She has a rash under her breast consistent with intertrigo  NEURO:, mentation intact and speech normal  PSYCH: mentation appears normal, affect normal/bright  She has moles, freckles and seborrheic keratosis    ASSESSMENT / PLAN:   Moira was seen today for physical, imm/inj and imm/inj.    Diagnoses and all orders for this visit:    Encounter for Medicare annual wellness exam  Preventive health counseling was also done.  Flu shot and COVID booster today    Type 2 diabetes mellitus with diabetic nephropathy, without long-term current use of insulin (H)  -     TSH with free T4 reflex; Future  -     Hemoglobin A1c  -     ND FOOT EXAM NO CHARGE  -     Lipid panel reflex to direct LDL Non-fasting; Future  -     TSH with free T4 reflex  -     Lipid panel reflex to direct LDL Non-fasting  Lab Results   Component Value Date    A1C 6.8 10/24/2022    A1C 6.7 06/27/2022    A1C 7.2 03/03/2022    A1C 6.6 08/27/2021    A1C 6.2 08/24/2020    A1C 6.5 01/20/2020    A1C 6.1 09/20/2019    A1C 6.1 06/19/2019    A1C 6.4 03/04/2019     I discussed with her about taking Ozempic which will help her to lose weight  She is having difficulty losing weight  Ozempic is really a good for her  as it is beneficial for heart and kidneys as well as weight gain take her off of the glimepiride    Obesity hypoventilation syndrome (H)  She is morbidly obese    Stage 3b chronic kidney disease (H)  -     CBC with platelets; Future  -     Renal panel (Alb, BUN, Ca, Cl, CO2, Creat, Gluc, Phos, K, Na); Future  -     CBC with  "platelets  -     Renal panel (Alb, BUN, Ca, Cl, CO2, Creat, Gluc, Phos, K, Na)  She has seen nephrology  She had hyperkalemia with losartan  Cough with lisinopril  Could not afford Jardiance  So she is not on those medications  Note of nephrologist was reviewed    ELIGIO (obstructive sleep apnea)  Discussed the importance of compliance with her CPAP machine    Morbid obesity (H)  Beside healthy diet and exercise Ozempic would be a good choice    Essential hypertension  Monitor your blood pressure once a week  at home.  Bring those readings on your next visit.  Notify us if your blood pressure readings consistently stays greater than 140/90.    Lumbar radiculopathy  She has seen orthopedics in the past and going to make an appointment    Moderate major depression (H)  Doing well with duloxetine    Ventral hernia without obstruction or gangrene  Patient has very large hernia  Due to her comorbidities she does not want to take the risk of getting surgery  She has seen general surgeon    High priority for 2019-nCoV vaccine  -     COVID-19,PF,MODERNA BIVALENT 18+Yrs    Need for prophylactic vaccination and inoculation against influenza  -     INFLUENZA, QUAD, HIGH DOSE, PF, 65YR + (FLUZONE HD)    Other orders  -     REVIEW OF HEALTH MAINTENANCE PROTOCOL ORDERS        Patient has been advised of split billing requirements and indicates understanding: Yes      COUNSELING:  Reviewed preventive health counseling, as reflected in patient instructions       Regular exercise       Healthy diet/nutrition       Osteoporosis prevention/bone health    Estimated body mass index is 47.93 kg/m  as calculated from the following:    Height as of this encounter: 1.651 m (5' 5\").    Weight as of this encounter: 130.6 kg (288 lb).        She reports that she quit smoking about 49 years ago. Her smoking use included cigarettes. She started smoking about 50 years ago. She has a 0.25 pack-year smoking history. She has never used smokeless " tobacco.      Appropriate preventive services were discussed with this patient, including applicable screening as appropriate for cardiovascular disease, diabetes, osteopenia/osteoporosis, and glaucoma.  As appropriate for age/gender, discussed screening for colorectal cancer, prostate cancer, breast cancer, and cervical cancer. Checklist reviewing preventive services available has been given to the patient.    Reviewed patients plan of care and provided an AVS. The Basic Care Plan (routine screening as documented in Health Maintenance) for Moira meets the Care Plan requirement. This Care Plan has been established and reviewed with the Patient.    Counseling Resources:  ATP IV Guidelines  Pooled Cohorts Equation Calculator  Breast Cancer Risk Calculator  Breast Cancer: Medication to Reduce Risk  FRAX Risk Assessment  ICSI Preventive Guidelines  Dietary Guidelines for Americans, 2010  USDA's MyPlate  ASA Prophylaxis  Lung CA Screening    Maryan Churchill MD  [facility]    Identified Health Risks:

## 2022-10-25 LAB
ALBUMIN SERPL-MCNC: 3.7 G/DL (ref 3.4–5)
ANION GAP SERPL CALCULATED.3IONS-SCNC: 4 MMOL/L (ref 3–14)
BUN SERPL-MCNC: 38 MG/DL (ref 7–30)
CALCIUM SERPL-MCNC: 9 MG/DL (ref 8.5–10.1)
CHLORIDE BLD-SCNC: 109 MMOL/L (ref 94–109)
CHOLEST SERPL-MCNC: 145 MG/DL
CO2 SERPL-SCNC: 27 MMOL/L (ref 20–32)
CREAT SERPL-MCNC: 1.59 MG/DL (ref 0.52–1.04)
FASTING STATUS PATIENT QL REPORTED: NO
GFR SERPL CREATININE-BSD FRML MDRD: 31 ML/MIN/1.73M2
GLUCOSE BLD-MCNC: 80 MG/DL (ref 70–99)
HDLC SERPL-MCNC: 44 MG/DL
LDLC SERPL CALC-MCNC: 52 MG/DL
NONHDLC SERPL-MCNC: 101 MG/DL
PHOSPHATE SERPL-MCNC: 3.4 MG/DL (ref 2.5–4.5)
POTASSIUM BLD-SCNC: 4.9 MMOL/L (ref 3.4–5.3)
SODIUM SERPL-SCNC: 140 MMOL/L (ref 133–144)
TRIGL SERPL-MCNC: 246 MG/DL
TSH SERPL DL<=0.005 MIU/L-ACNC: 1.98 MU/L (ref 0.4–4)

## 2022-10-26 NOTE — RESULT ENCOUNTER NOTE
Please notify patient by sending following letter with copy of test results      Hello Moira,    This is to inform you regarding your test result.    TSH which is thyroid hormone is normal.  Your total cholesterol is normal.  HDL which is called good cholesterol is low  Your LDL cholesterol is normal.  This is often call bad cholesterol and high levels increase the risk for heart attacks and strokes.  The triglycerides are high. Lowering  the amount of sugar ,alcohol and sweets in the diet helps to control this.Exercise and weight loss helps.  Chronic kidney disease is stable   Stay well hydrated .  HbA1c which is average glucose during last 3 months is 6.8%.          Sincerely,      Dr.Nasima Kerline MD,FACP

## 2022-11-06 DIAGNOSIS — J45.20 MILD INTERMITTENT ASTHMA WITHOUT COMPLICATION: ICD-10-CM

## 2022-11-09 RX ORDER — FLUTICASONE PROPIONATE AND SALMETEROL 100; 50 UG/1; UG/1
POWDER RESPIRATORY (INHALATION)
Qty: 180 EACH | Refills: 0 | Status: SHIPPED | OUTPATIENT
Start: 2022-11-09 | End: 2023-01-23

## 2022-11-09 NOTE — TELEPHONE ENCOUNTER
Prescription approved per [facility] Refill Protocol.  Lucia MCKNIGHT, Triage RN  [facility]

## 2022-12-31 DIAGNOSIS — I25.10 CORONARY ARTERY DISEASE INVOLVING NATIVE CORONARY ARTERY OF NATIVE HEART WITHOUT ANGINA PECTORIS: ICD-10-CM

## 2022-12-31 DIAGNOSIS — E78.5 HYPERLIPIDEMIA LDL GOAL <70: ICD-10-CM

## 2023-01-03 RX ORDER — CLOPIDOGREL BISULFATE 75 MG/1
TABLET ORAL
Qty: 100 TABLET | Refills: 2 | Status: SHIPPED | OUTPATIENT
Start: 2023-01-03 | End: 2023-09-15

## 2023-01-03 RX ORDER — ATORVASTATIN CALCIUM 20 MG/1
TABLET, FILM COATED ORAL
Qty: 100 TABLET | Refills: 2 | Status: SHIPPED | OUTPATIENT
Start: 2023-01-03 | End: 2023-09-15

## 2023-01-16 NOTE — MR AVS SNAPSHOT
After Visit Summary   8/15/2017    Moira Andre    MRN: [id number]           Patient Information     Date Of Birth          9/24/1933        Visit Information        Provider Department      8/15/2017 9:00 AM Maryan Churchill MD [facility]        Today's Diagnoses     Type 2 diabetes mellitus with diabetic nephropathy, without long-term current use of insulin (H)    -  1    Morbid obesity due to excess calories (H)        Major depressive disorder, recurrent episode, moderate (H)        Acute pain of right shoulder        Intermittent asthma, uncomplicated        Ventral hernia without obstruction or gangrene          Care Instructions    Start taking metformin as prescribed   The dose of duloxetine is increased to 60 mg daily  You can take # 2 tablet of 30 mg Cymbalta and once you finish current supply then go to Cymbalta( duloxetine) 60 mg one tab daily   Norco  can be habit-forming. It should be taken as prescribed. Do not mix it  with alcohol. Be careful with driving.Do not loose the  Prescription.  Do not overuse this medication. It is a controlled substance.  Labs today  Follow up in 3 months   Seek sooner medical attention if there is any new or worsening symptoms            Follow-ups after your visit        Who to contact     If you have questions or need follow up information about today's clinic visit or your schedule please contact [facility] directly at [phone].  Normal or non-critical lab and imaging results will be communicated to you by MyChart, letter or phone within 4 business days after the clinic has received the results. If you do not hear from us within 7 days, please contact the clinic through MyChart or phone. If you have a critical or abnormal lab result, we will notify you by phone as soon as possible.  Submit refill requests through [employer] or call your pharmacy and they will forward the refill request to us. Please allow 3 business days for  Ship to  Pt  Home       Gauze 4x4 N/S 200 4x4s per unit  [id number]    Francoise De La Torre  2 "your refill to be completed.          Additional Information About Your Visit        [employer] Information     [employer] lets you send messages to your doctor, view your test results, renew your prescriptions, schedule appointments and more. To sign up, go to [email or web address] . Click on \"Log in\" on the left side of the screen, which will take you to the Welcome page. Then click on \"Sign up Now\" on the right side of the page.     You will be asked to enter the access code listed below, as well as some personal information. Please follow the directions to create your username and password.     Your access code is: [email or web address]  Expires: 10/5/2017  3:22 PM     Your access code will  in 90 days. If you need help or a new code, please call your [facility] clinic or [phone].        Care EveryWhere ID     This is your Care EveryWhere ID. This could be used by other organizations to access your [facility] medical records  [id number]        Your Vitals Were     Pulse Temperature Respirations Height Pulse Oximetry BMI (Body Mass Index)    83 97.4  F (36.3  C) (Oral) 20 5' 7\" (1.702 m) 94% 44.01 kg/m2       Blood Pressure from Last 3 Encounters:   08/15/17 124/70   17 129/80   17 109/73    Weight from Last 3 Encounters:   08/15/17 281 lb (127.5 kg)   17 279 lb (126.6 kg)   17 276 lb 11.2 oz (125.5 kg)              We Performed the Following     Albumin Random Urine Quantitative     Basic metabolic panel     Hemoglobin A1c          Today's Medication Changes          These changes are accurate as of: 8/15/17  9:28 AM.  If you have any questions, ask your nurse or doctor.               Start taking these medicines.        Dose/Directions    diclofenac 1 % Gel topical gel   Commonly known as:  VOLTAREN   Used for:  Acute pain of right shoulder   Started by:  Maryan Churchill MD        Apply 4 grams to knees or 2 grams to hands four times daily using enclosed dosing card.   Quantity:  100 g "   Refills:  1       metFORMIN 500 MG 24 hr tablet   Commonly known as:  GLUCOPHAGE-XR   Used for:  Type 2 diabetes mellitus with diabetic nephropathy, without long-term current use of insulin (H)   Started by:  Maryan Churchill MD        Dose:  1000 mg   Take 2 tablets (1,000 mg) by mouth daily (with dinner)   Quantity:  180 tablet   Refills:  1         These medicines have changed or have updated prescriptions.        Dose/Directions    DULoxetine 60 MG EC capsule   Commonly known as:  CYMBALTA   This may have changed:  See the new instructions.   Used for:  Major depressive disorder, recurrent episode, moderate (H)   Changed by:  Maryan Churchill MD        Dose:  60 mg   Take 1 capsule (60 mg) by mouth daily   Quantity:  90 capsule   Refills:  1            Where to get your medicines      Some of these will need a paper prescription and others can be bought over the counter.  Ask your nurse if you have questions.     Bring a paper prescription for each of these medications     diclofenac 1 % Gel topical gel    DULoxetine 60 MG EC capsule    metFORMIN 500 MG 24 hr tablet                Primary Care Provider Office Phone # Fax #    Maryan Churchill [phone] [address]  [facility] [address]        Equal Access to Services     [facility] : Hadii gissel Grey, kristina de jesus, selena obando, andreina bell. So [facility] [phone].    ATENCIÓN: Si habla español, tiene a gold disposición servicios gratuitos de asistencia lingüística. Llame al [phone].    We comply with applicable federal civil rights laws and [place] laws. We do not discriminate on the basis of race, color, national origin, age, disability sex, sexual orientation or gender identity.            Thank you!     Thank you for choosing [facility]  for your care. Our goal is always to provide you with excellent care. Hearing back from our  patients is one way we can continue to improve our services. Please take a few minutes to complete the written survey that you may receive in the mail after your visit with us. Thank you!             Your Updated Medication List - Protect others around you: Learn how to safely use, store and throw away your medicines at www.disposemymeds.org.          This list is accurate as of: 8/15/17  9:28 AM.  Always use your most recent med list.                   Brand Name Dispense Instructions for use Diagnosis    acetaminophen 325 MG tablet    TYLENOL     Take 325-650 mg by mouth every 6 hours as needed for mild pain        ADVAIR DISKUS 100-50 MCG/DOSE diskus inhaler   Generic drug:  fluticasone-salmeterol     60 Inhaler    INHALE 1 PUFF EVERY 12 HOURS    Intermittent asthma, uncomplicated       albuterol 108 (90 BASE) MCG/ACT Inhaler    PROAIR HFA/PROVENTIL HFA/VENTOLIN HFA    1 Inhaler    Inhale 2 puffs into the lungs every 6 hours as needed for shortness of breath / dyspnea    Intermittent asthma       atorvastatin 20 MG tablet    LIPITOR    90 tablet    Take 1 tablet (20 mg) by mouth daily    Hyperlipidemia LDL goal <70       * blood glucose monitoring meter device kit    no brand specified    1 kit    Use to test blood sugar 1-2 times daily or as directed.    Type 2 diabetes mellitus with diabetic nephropathy (H)       * TRUE METRIX AIR GLUCOSE METER W/DEVICE Kit     1 kit    USE AS DIRECTED    Type 2 diabetes mellitus with diabetic nephropathy (H)       blood glucose monitoring test strip    no brand specified    100 each    Use to test blood sugars 1-2 times daily or as directed    Type 2 diabetes mellitus with diabetic nephropathy (H)       calcium-vitamin D 600-400 MG-UNIT per tablet    CALTRATE     Take 1 tablet by mouth daily        clopidogrel 75 MG tablet    PLAVIX    90 tablet    Take 1 tablet (75 mg) by mouth daily    Coronary artery disease involving native coronary artery of native heart without angina  pectoris       diclofenac 1 % Gel topical gel    VOLTAREN    100 g    Apply 4 grams to knees or 2 grams to hands four times daily using enclosed dosing card.    Acute pain of right shoulder       DULoxetine 60 MG EC capsule    CYMBALTA    90 capsule    Take 1 capsule (60 mg) by mouth daily    Major depressive disorder, recurrent episode, moderate (H)       FEXOFENADINE HCL PO      Take 180 mg by mouth daily.        FLONASE 50 MCG/ACT spray   Generic drug:  fluticasone      Spray 1 spray into both nostrils as needed        Glucosamine HCl 750 MG Tabs      Take 1 tablet by mouth 2 times daily        HAIR/SKIN/NAILS Tabs      Take 1 tablet by mouth daily.        HYDROcodone-acetaminophen 5-325 MG per tablet    NORCO    90 tablet    Take 1 tablet by mouth every 8 hours as needed for moderate to severe pain    Multiple joint pain       IBANdronate 150 MG tablet    BONIVA    3 tablet    Take 1 tablet (150 mg) by mouth every 30 days    Osteopenia       lisinopril 10 MG tablet    PRINIVIL/ZESTRIL    90 tablet    Take 1 tablet (10 mg) by mouth daily    Essential hypertension with goal blood pressure less than 140/90       metFORMIN 500 MG 24 hr tablet    GLUCOPHAGE-XR    180 tablet    Take 2 tablets (1,000 mg) by mouth daily (with dinner)    Type 2 diabetes mellitus with diabetic nephropathy, without long-term current use of insulin (H)       metoprolol 100 MG 24 hr tablet    TOPROL-XL    90 tablet    Take 1 tablet (100 mg) by mouth daily    Essential hypertension with goal blood pressure less than 140/90       NITROQUICK SL      Place 0.4 mg under the tongue every 5 minutes as needed        nystatin-triamcinolone cream    MYCOLOG II    60 g    Apply topically 2 times daily    Tinea of the body       order for DME      DREAMSTATION 5-15 CM/H20 NASAL WISP FABRIC        TRUEPLUS LANCETS 28G Misc     100 each    1 each 2 times daily as needed    Type 2 diabetes mellitus with diabetic nephropathy (H)       TURMERIC PO            vitamin D 2000 UNITS Caps      Take by mouth daily        * Notice:  This list has 2 medication(s) that are the same as other medications prescribed for you. Read the directions carefully, and ask your doctor or other care provider to review them with you.

## 2023-01-27 ENCOUNTER — TRANSFERRED RECORDS (OUTPATIENT)
Dept: HEALTH INFORMATION MANAGEMENT | Facility: CLINIC | Age: 88
End: 2023-01-27

## 2023-01-27 DIAGNOSIS — G89.29 OTHER CHRONIC PAIN: ICD-10-CM

## 2023-01-27 DIAGNOSIS — M54.16 LUMBAR RADICULOPATHY: ICD-10-CM

## 2023-01-27 RX ORDER — OXYCODONE HYDROCHLORIDE 5 MG/1
5 TABLET ORAL DAILY PRN
Qty: 20 TABLET | Refills: 0 | Status: SHIPPED | OUTPATIENT
Start: 2023-01-27 | End: 2023-02-24

## 2023-01-27 NOTE — TELEPHONE ENCOUNTER
Patient calling clinic to request refills on Oxycodone 5 mg. Patient reports she has 6 days left of script and has been having chronic back pain. Patient is requesting refills now so home delivery service can deliver script on time.     Script and pharmacy pended for review. Routing to refill pool.

## 2023-01-31 ENCOUNTER — TELEPHONE (OUTPATIENT)
Dept: FAMILY MEDICINE | Facility: CLINIC | Age: 88
End: 2023-01-31
Payer: COMMERCIAL

## 2023-01-31 NOTE — TELEPHONE ENCOUNTER
Outpatient Medication Detail     Disp Refills Start End PRO   oxyCODONE (ROXICODONE) 5 MG tablet 20 tablet 0 1/27/2023  No   Sig - Route: Take 1 tablet (5 mg) by mouth daily as needed for pain - Oral   Sent to pharmacy as: oxyCODONE HCl 5 MG Oral Tablet (ROXICODONE)   Class: E-Prescribe   Earliest Fill Date: 1/27/2023   Order: [id number]   E-Prescribing Status: Receipt confirmed by pharmacy (1/27/2023  3:40 PM CST)     Pharmacy    [facility] - [place], [place] - [address]     Associated Diagnoses    Lumbar radiculopathy [M54.16]    She takes oxycodone sparingly      Other chronic pain [G89.29]    Uses oxycodone sparingly and has signed treatment agreement          Note from pharmacy:  Due to patient's plan requirements, patients new to opiate therapy (no fills within last 120 days) should use the shortest duration of therapy.  The patient's plan has indicated they are new to opiate therapy and therefore a 7-day supply of Oxycodone 5mg was dispensed.  This is for your information only.

## 2023-02-05 DIAGNOSIS — I10 ESSENTIAL HYPERTENSION: ICD-10-CM

## 2023-02-06 RX ORDER — HYDRALAZINE HYDROCHLORIDE 25 MG/1
TABLET, FILM COATED ORAL
Qty: 180 TABLET | Refills: 1 | Status: SHIPPED | OUTPATIENT
Start: 2023-02-06 | End: 2023-06-20

## 2023-02-06 NOTE — TELEPHONE ENCOUNTER
Prescription approved per [facility] Refill Protocol.  Yuridia Guthrie RN  [facility]

## 2023-02-24 ENCOUNTER — OFFICE VISIT (OUTPATIENT)
Dept: FAMILY MEDICINE | Facility: CLINIC | Age: 88
End: 2023-02-24
Payer: COMMERCIAL

## 2023-02-24 VITALS
RESPIRATION RATE: 22 BRPM | DIASTOLIC BLOOD PRESSURE: 79 MMHG | WEIGHT: 287.6 LBS | HEIGHT: 65 IN | SYSTOLIC BLOOD PRESSURE: 130 MMHG | OXYGEN SATURATION: 97 % | TEMPERATURE: 98.2 F | BODY MASS INDEX: 47.92 KG/M2 | HEART RATE: 89 BPM

## 2023-02-24 DIAGNOSIS — K43.9 VENTRAL HERNIA WITHOUT OBSTRUCTION OR GANGRENE: ICD-10-CM

## 2023-02-24 DIAGNOSIS — Z13.0 SCREENING FOR DEFICIENCY ANEMIA: ICD-10-CM

## 2023-02-24 DIAGNOSIS — E66.2 OBESITY HYPOVENTILATION SYNDROME (H): ICD-10-CM

## 2023-02-24 DIAGNOSIS — K21.9 GASTROESOPHAGEAL REFLUX DISEASE WITHOUT ESOPHAGITIS: ICD-10-CM

## 2023-02-24 DIAGNOSIS — J45.20 MILD INTERMITTENT ASTHMA WITHOUT COMPLICATION: ICD-10-CM

## 2023-02-24 DIAGNOSIS — Z78.0 ASYMPTOMATIC POSTMENOPAUSAL STATUS: ICD-10-CM

## 2023-02-24 DIAGNOSIS — F32.1 MODERATE MAJOR DEPRESSION (H): ICD-10-CM

## 2023-02-24 DIAGNOSIS — G47.33 OSA (OBSTRUCTIVE SLEEP APNEA): ICD-10-CM

## 2023-02-24 DIAGNOSIS — M79.89 ARM SWELLING: ICD-10-CM

## 2023-02-24 DIAGNOSIS — E66.01 MORBID OBESITY (H): ICD-10-CM

## 2023-02-24 DIAGNOSIS — M85.80 OSTEOPENIA WITH HIGH RISK OF FRACTURE: ICD-10-CM

## 2023-02-24 DIAGNOSIS — N18.32 STAGE 3B CHRONIC KIDNEY DISEASE (H): ICD-10-CM

## 2023-02-24 DIAGNOSIS — E11.21 TYPE 2 DIABETES MELLITUS WITH DIABETIC NEPHROPATHY, WITHOUT LONG-TERM CURRENT USE OF INSULIN (H): Primary | ICD-10-CM

## 2023-02-24 DIAGNOSIS — I10 ESSENTIAL HYPERTENSION: ICD-10-CM

## 2023-02-24 LAB
ALBUMIN SERPL BCG-MCNC: 4.3 G/DL (ref 3.5–5.2)
ALBUMIN UR-MCNC: 100 MG/DL
ALP SERPL-CCNC: 77 U/L (ref 35–104)
ALT SERPL W P-5'-P-CCNC: 10 U/L (ref 10–35)
ANION GAP SERPL CALCULATED.3IONS-SCNC: 15 MMOL/L (ref 7–15)
APPEARANCE UR: CLEAR
AST SERPL W P-5'-P-CCNC: 16 U/L (ref 10–35)
BACTERIA #/AREA URNS HPF: ABNORMAL /HPF
BASOPHILS # BLD AUTO: 0 10E3/UL (ref 0–0.2)
BASOPHILS NFR BLD AUTO: 0 %
BILIRUB SERPL-MCNC: 0.3 MG/DL
BILIRUB UR QL STRIP: NEGATIVE
BUN SERPL-MCNC: 29 MG/DL (ref 8–23)
CALCIUM SERPL-MCNC: 9.7 MG/DL (ref 8.8–10.2)
CHLORIDE SERPL-SCNC: 102 MMOL/L (ref 98–107)
COLOR UR AUTO: YELLOW
CREAT SERPL-MCNC: 1.46 MG/DL (ref 0.51–0.95)
CREAT UR-MCNC: 84.4 MG/DL
DEPRECATED HCO3 PLAS-SCNC: 21 MMOL/L (ref 22–29)
EOSINOPHIL # BLD AUTO: 0.1 10E3/UL (ref 0–0.7)
EOSINOPHIL NFR BLD AUTO: 1 %
ERYTHROCYTE [DISTWIDTH] IN BLOOD BY AUTOMATED COUNT: 15.3 % (ref 10–15)
FERRITIN SERPL-MCNC: 152 NG/ML (ref 11–328)
GFR SERPL CREATININE-BSD FRML MDRD: 34 ML/MIN/1.73M2
GLUCOSE SERPL-MCNC: 184 MG/DL (ref 70–99)
GLUCOSE UR STRIP-MCNC: NEGATIVE MG/DL
HBA1C MFR BLD: 6.9 % (ref 0–5.6)
HCT VFR BLD AUTO: 47 % (ref 35–47)
HGB BLD-MCNC: 13.5 G/DL (ref 11.7–15.7)
HGB UR QL STRIP: ABNORMAL
IMM GRANULOCYTES # BLD: 0 10E3/UL
IMM GRANULOCYTES NFR BLD: 0 %
KETONES UR STRIP-MCNC: NEGATIVE MG/DL
LEUKOCYTE ESTERASE UR QL STRIP: ABNORMAL
LYMPHOCYTES # BLD AUTO: 1.5 10E3/UL (ref 0.8–5.3)
LYMPHOCYTES NFR BLD AUTO: 13 %
MCH RBC QN AUTO: 25.4 PG (ref 26.5–33)
MCHC RBC AUTO-ENTMCNC: 28.7 G/DL (ref 31.5–36.5)
MCV RBC AUTO: 89 FL (ref 78–100)
MICROALBUMIN UR-MCNC: 483 MG/L
MICROALBUMIN/CREAT UR: 572.27 MG/G CR (ref 0–25)
MONOCYTES # BLD AUTO: 0.7 10E3/UL (ref 0–1.3)
MONOCYTES NFR BLD AUTO: 6 %
NEUTROPHILS # BLD AUTO: 9.3 10E3/UL (ref 1.6–8.3)
NEUTROPHILS NFR BLD AUTO: 79 %
NITRATE UR QL: POSITIVE
PH UR STRIP: 6 [PH] (ref 5–7)
PHOSPHATE SERPL-MCNC: 3.3 MG/DL (ref 2.5–4.5)
PLATELET # BLD AUTO: 308 10E3/UL (ref 150–450)
POTASSIUM SERPL-SCNC: 4.5 MMOL/L (ref 3.4–5.3)
PROT SERPL-MCNC: 7.5 G/DL (ref 6.4–8.3)
RBC # BLD AUTO: 5.31 10E6/UL (ref 3.8–5.2)
RBC #/AREA URNS AUTO: ABNORMAL /HPF
SODIUM SERPL-SCNC: 138 MMOL/L (ref 136–145)
SP GR UR STRIP: 1.02 (ref 1–1.03)
UROBILINOGEN UR STRIP-ACNC: 0.2 E.U./DL
WBC # BLD AUTO: 11.7 10E3/UL (ref 4–11)
WBC #/AREA URNS AUTO: ABNORMAL /HPF
WBC CLUMPS #/AREA URNS HPF: PRESENT /HPF

## 2023-02-24 PROCEDURE — 83036 HEMOGLOBIN GLYCOSYLATED A1C: CPT | Performed by: INTERNAL MEDICINE

## 2023-02-24 PROCEDURE — 85025 COMPLETE CBC W/AUTO DIFF WBC: CPT | Performed by: INTERNAL MEDICINE

## 2023-02-24 PROCEDURE — 82728 ASSAY OF FERRITIN: CPT | Performed by: INTERNAL MEDICINE

## 2023-02-24 PROCEDURE — 82570 ASSAY OF URINE CREATININE: CPT | Performed by: INTERNAL MEDICINE

## 2023-02-24 PROCEDURE — 80053 COMPREHEN METABOLIC PANEL: CPT | Performed by: INTERNAL MEDICINE

## 2023-02-24 PROCEDURE — 82043 UR ALBUMIN QUANTITATIVE: CPT | Performed by: INTERNAL MEDICINE

## 2023-02-24 PROCEDURE — 99214 OFFICE O/P EST MOD 30 MIN: CPT | Performed by: INTERNAL MEDICINE

## 2023-02-24 PROCEDURE — 36415 COLL VENOUS BLD VENIPUNCTURE: CPT | Performed by: INTERNAL MEDICINE

## 2023-02-24 PROCEDURE — 84100 ASSAY OF PHOSPHORUS: CPT | Performed by: INTERNAL MEDICINE

## 2023-02-24 PROCEDURE — 81001 URINALYSIS AUTO W/SCOPE: CPT | Performed by: INTERNAL MEDICINE

## 2023-02-24 RX ORDER — PANTOPRAZOLE SODIUM 20 MG/1
20 TABLET, DELAYED RELEASE ORAL DAILY
Qty: 90 TABLET | Refills: 3 | Status: SHIPPED | OUTPATIENT
Start: 2023-02-24 | End: 2024-01-11

## 2023-02-24 RX ORDER — FLUTICASONE PROPIONATE AND SALMETEROL 100; 50 UG/1; UG/1
POWDER RESPIRATORY (INHALATION)
Qty: 180 EACH | Refills: 3 | Status: SHIPPED | OUTPATIENT
Start: 2023-02-24

## 2023-02-24 RX ORDER — DULOXETIN HYDROCHLORIDE 60 MG/1
60 CAPSULE, DELAYED RELEASE ORAL DAILY
Qty: 90 CAPSULE | Refills: 3 | Status: SHIPPED | OUTPATIENT
Start: 2023-02-24 | End: 2024-01-11

## 2023-02-24 RX ORDER — BUPROPION HYDROCHLORIDE 100 MG/1
100 TABLET, EXTENDED RELEASE ORAL DAILY
Qty: 90 TABLET | Refills: 3 | Status: SHIPPED | OUTPATIENT
Start: 2023-02-24 | End: 2024-01-11

## 2023-02-24 RX ORDER — TIRZEPATIDE 2.5 MG/.5ML
2.5 INJECTION, SOLUTION SUBCUTANEOUS
Qty: 2 ML | Refills: 1 | Status: SHIPPED | OUTPATIENT
Start: 2023-02-24 | End: 2023-04-12 | Stop reason: DRUGHIGH

## 2023-02-24 ASSESSMENT — PAIN SCALES - GENERAL: PAINLEVEL: NO PAIN (0)

## 2023-02-24 NOTE — PROGRESS NOTES
Moira was seen today for follow up and health maintenance.    Diagnoses and all orders for this visit:    Type 2 diabetes mellitus with diabetic nephropathy, without long-term current use of insulin (H)  -     She is on glimepiride        - If her insurance covers mounjaro, we will schedule a teaching session and will also discontinue glimepiride - patient verbalized understanding  - tirzepatide (MOUNJARO) 2.5 MG/0.5ML pen; Inject 2.5 mg Subcutaneous every 7 days For diabetes and weight loss  -     Hemoglobin A1c; Future    Moderate major depression (H)  -     She is on wellbutrin and duloxetine  - buPROPion (WELLBUTRIN SR) 100 MG 12 hr tablet; Take 1 tablet (100 mg) by mouth daily for mood  -     DULoxetine (CYMBALTA) 60 MG capsule; Take 1 capsule (60 mg) by mouth daily    Mild intermittent asthma without complication  -     Controlled with Wixela   - fluticasone-salmeterol (WIXELA INHUB) 100-50 MCG/ACT inhaler; USE 1 INHALATION BY MOUTH EVERY  12 HOURS    Morbid obesity (H)  -     Provided information about mounjaro; she is interested about medications to help with her weight loss since she has no improvement with dietary changes.   - Patient understands that the medication needs to be titrated and requires follow up once a month.  - tirzepatide (MOUNJARO) 2.5 MG/0.5ML pen; Inject 2.5 mg Subcutaneous every 7 days For diabetes and weight loss    Stage 3b chronic kidney disease (H)  -     Follows with nephrology   - Renal panel (Alb, BUN, Ca, Cl, CO2, Creat, Gluc, Phos, K, Na); Future    Arm swelling  Comments:  right side  One week ago, she woke up with the bruises on her right arm. She denies injuring her arm.  However, she notes that she uses her right hand to lift heavy grocery bags and often sleeps on her right side.         - Recommended her to avoid heavy lifting and sleeping on the right side for now.         - Tylenol and ices are okay to use for pain.         - Instructed her to contact me if the symptom  "does not improve        - Discussed her the side effects of plavix.   But continue Plavix as she has risk factor  Benefit outweighs the risk    Obesity hypoventilation syndrome (H)        - Discussed the importance of weight loss    Essential hypertension        - Reviewed BP - 155/81        - Rechecked BP - 130/79    Gastroesophageal reflux disease without esophagitis  -     Controlled with pantoprazole; notes that the symptom returns if she does not take one.   - pantoprazole (PROTONIX) 20 MG EC tablet; Take 1 tablet (20 mg) by mouth daily    Ventral hernia without obstruction or gangrene        - Stable    ELIGIO (obstructive sleep apnea)  Comments:  not using Cpap     Osteopenia with high risk of fracture  -     DEXA on 07/02/2015  - Referral done; patient will make an appointment at her convenience.   - DX Hip/Pelvis/Spine; Future    Asymptomatic postmenopausal status  -     See above  - DX Hip/Pelvis/Spine; Future    Screening for deficiency anemia  -     CBC with platelets; Future  -     Ferritin; Future    Other        - She notes that she has a plan to receive shingles as her current insurance now pays for them        Patient has peripheral arterial disease it was diagnosed by her insurance company's provider  At this point she is taking care of her risk factor  She is able to move around    Guanako Simons is a 89 year old, presenting for the following health issues:  Follow Up and Health Maintenance (Eye exam: [facility]/20[facility] )      SURESH Simons is a 89 year old female, presenting here for follow up.       Review of Systems   Constitutional, HEENT, cardiovascular, pulmonary, GI, , musculoskeletal, neuro, skin, endocrine and psych systems are negative, except as otherwise noted.      Objective    /79 (BP Location: Left arm, Patient Position: Sitting)   Pulse 89   Temp 98.2  F (36.8  C) (Oral)   Resp 22   Ht 1.651 m (5' 5\")   Wt 130.5 kg (287 lb 9.6 oz)   SpO2 97%   " BMI 47.86 kg/m    Body mass index is 47.86 kg/m .     Physical Exam   GENERAL: healthy, alert and no distress  PSYCH: mentation appears normal, affect normal/bright  MSK: RUE swelling with bruises.    This document serves as a record of the services and decisions personally performed and made by Dr. Churchill. It was created on her behalf by Naomi Garcia, a trained medical scribe. The creation of this document is based the provider's statements to the medical scribe.

## 2023-02-24 NOTE — LETTER
February 28, 2023      Kristyn ALEGRIA Jes  [address]  [facility] [id number]        Dear ,    We are writing to inform you of your test results.    Mert Pizarro,     This is to inform you regarding your test result.     HbA1c which is average glucose during last 3 months is 6.9%.   Diabetes is under good control   Ferritin which is iron stores in the body is normal.   Phosphorus level is normal   Chronic kidney disease is stable and likely better   CBC result which includes Hemoglobin and  Platelet Counts is satisfactory.   Keep your appointment with nephrology as a scheduled       Lab Results        Component                Value               Date                        A1C                      6.9                 02/24/2023                  A1C                      6.8                 10/24/2022                  A1C                      6.7                 06/27/2022                  A1C                      7.2                 03/03/2022                  A1C                      6.6                 08/27/2021                  A1C                      6.2                 08/24/2020                  A1C                      6.5                 01/20/2020                  A1C                      6.1                 09/20/2019                  A1C                      6.1                 06/19/2019                  A1C                      6.4                 03/04/2019                 Sincerely,       Dr.Nasima Kerline MD,FACP   Mert Pizarro,     This is to inform you regarding your test result.     HbA1c which is average glucose during last 3 months is 6.9%.   Diabetes is under good control   Ferritin which is iron stores in the body is normal.   Phosphorus level is normal   Chronic kidney disease is stable and likely better   CBC result which includes Hemoglobin and  Platelet Counts is satisfactory.   Keep your appointment with nephrology as a scheduled       Lab Results        Component                 Value               Date                        A1C                      6.9                 02/24/2023                  A1C                      6.8                 10/24/2022                  A1C                      6.7                 06/27/2022                  A1C                      7.2                 03/03/2022                  A1C                      6.6                 08/27/2021                  A1C                      6.2                 08/24/2020                  A1C                      6.5                 01/20/2020                  A1C                      6.1                 09/20/2019                  A1C                      6.1                 06/19/2019                  A1C                      6.4                 03/04/2019                 Sincerely,       [facility]   Mert Pizarro,     This is to inform you regarding your test result.     HbA1c which is average glucose during last 3 months is 6.9%.   Diabetes is under good control   Ferritin which is iron stores in the body is normal.   Phosphorus level is normal   Chronic kidney disease is stable and likely better   CBC result which includes Hemoglobin and  Platelet Counts is satisfactory.   Keep your appointment with nephrology as a scheduled       Lab Results        Component                Value               Date                        A1C                      6.9                 02/24/2023                  A1C                      6.8                 10/24/2022                  A1C                      6.7                 06/27/2022                  A1C                      7.2                 03/03/2022                  A1C                      6.6                 08/27/2021                  A1C                      6.2                 08/24/2020                  A1C                      6.5                 01/20/2020                  A1C                      6.1                 09/20/2019                   A1C                      6.1                 06/19/2019                  A1C                      6.4                 03/04/2019                 Sincerely,       Dr.Nasima Kerline MD,FACP       Resulted Orders   Comprehensive metabolic panel   Result Value Ref Range    Sodium 138 136 - 145 mmol/L    Potassium 4.5 3.4 - 5.3 mmol/L    Chloride 102 98 - 107 mmol/L    Carbon Dioxide (CO2) 21 (L) 22 - 29 mmol/L    Anion Gap 15 7 - 15 mmol/L    Urea Nitrogen 29.0 (H) 8.0 - 23.0 mg/dL    Creatinine 1.46 (H) 0.51 - 0.95 mg/dL    Calcium 9.7 8.8 - 10.2 mg/dL    Glucose 184 (H) 70 - 99 mg/dL    Alkaline Phosphatase 77 35 - 104 U/L    AST 16 10 - 35 U/L    ALT 10 10 - 35 U/L    Protein Total 7.5 6.4 - 8.3 g/dL    Albumin 4.3 3.5 - 5.2 g/dL    Bilirubin Total 0.3 <=1.2 mg/dL    GFR Estimate 34 (L) >60 mL/min/1.73m2      Comment:      eGFR calculated using 2021 CKD-EPI equation.   Hemoglobin A1c   Result Value Ref Range    Hemoglobin A1C 6.9 (H) 0.0 - 5.6 %      Comment:      Normal <5.7%   Prediabetes 5.7-6.4%    Diabetes 6.5% or higher     Note: Adopted from ADA consensus guidelines.   Ferritin   Result Value Ref Range    Ferritin 152 11 - 328 ng/mL   CBC with platelets and differential   Result Value Ref Range    WBC Count 11.7 (H) 4.0 - 11.0 10e3/uL    RBC Count 5.31 (H) 3.80 - 5.20 10e6/uL    Hemoglobin 13.5 11.7 - 15.7 g/dL    Hematocrit 47.0 35.0 - 47.0 %    MCV 89 78 - 100 fL    MCH 25.4 (L) 26.5 - 33.0 pg    MCHC 28.7 (L) 31.5 - 36.5 g/dL    RDW 15.3 (H) 10.0 - 15.0 %    Platelet Count 308 150 - 450 10e3/uL    % Neutrophils 79 %    % Lymphocytes 13 %    % Monocytes 6 %    % Eosinophils 1 %    % Basophils 0 %    % Immature Granulocytes 0 %    Absolute Neutrophils 9.3 (H) 1.6 - 8.3 10e3/uL    Absolute Lymphocytes 1.5 0.8 - 5.3 10e3/uL    Absolute Monocytes 0.7 0.0 - 1.3 10e3/uL    Absolute Eosinophils 0.1 0.0 - 0.7 10e3/uL    Absolute Basophils 0.0 0.0 - 0.2 10e3/uL    Absolute Immature Granulocytes 0.0 <=0.4 10e3/uL    Phosphorus   Result Value Ref Range    Phosphorus 3.3 2.5 - 4.5 mg/dL       If you have any questions or concerns, please call the clinic at the number listed above.       Sincerely,      Maryan Churchill MD

## 2023-02-24 NOTE — PATIENT INSTRUCTIONS
Icing as needed   Tylenol as needed   You are due for bone density.  Please call the following number to make appointment :  [phone]  It is located in [address]  Labs today    Monitor your blood pressure once a week  at home.  Bring those readings on your next visit.  Notify us if your blood pressure readings consistently stays greater than 140/90.     If your insurance covers mounjaro then let me know and will arrange teaching session    Follow up in 4 months.  Seek sooner medical attention if there is any worsening of symptoms or problems.

## 2023-02-24 NOTE — Clinical Note
Please abstract the following data from this visit with this patient into the appropriate field in Epic:  Tests that can be patient reported without a hard copy:  Eye exam with ophthalmology on this date: 05/26/2022 Exam Location: [facility]

## 2023-02-28 NOTE — RESULT ENCOUNTER NOTE
Please notify patient by sending following letter with copy of test results      Mert Pizarro,    This is to inform you regarding your test result.    HbA1c which is average glucose during last 3 months is 6.9%.  Diabetes is under good control  Ferritin which is iron stores in the body is normal.  Phosphorus level is normal  Chronic kidney disease is stable and likely better  CBC result which includes Hemoglobin and  Platelet Counts is satisfactory.  Keep your appointment with nephrology as a scheduled      Lab Results       Component                Value               Date                       A1C                      6.9                 02/24/2023                 A1C                      6.8                 10/24/2022                 A1C                      6.7                 06/27/2022                 A1C                      7.2                 03/03/2022                 A1C                      6.6                 08/27/2021                 A1C                      6.2                 08/24/2020                 A1C                      6.5                 01/20/2020                 A1C                      6.1                 09/20/2019                 A1C                      6.1                 06/19/2019                 A1C                      6.4                 03/04/2019                Sincerely,      Dr.Nasima Kerline MD,FACP

## 2023-03-01 ENCOUNTER — TELEPHONE (OUTPATIENT)
Dept: NEPHROLOGY | Facility: CLINIC | Age: 88
End: 2023-03-01

## 2023-03-01 ENCOUNTER — OFFICE VISIT (OUTPATIENT)
Dept: NEPHROLOGY | Facility: CLINIC | Age: 88
End: 2023-03-01
Payer: COMMERCIAL

## 2023-03-01 VITALS
BODY MASS INDEX: 47.9 KG/M2 | HEIGHT: 65 IN | HEART RATE: 86 BPM | SYSTOLIC BLOOD PRESSURE: 124 MMHG | OXYGEN SATURATION: 95 % | WEIGHT: 287.5 LBS | DIASTOLIC BLOOD PRESSURE: 76 MMHG

## 2023-03-01 DIAGNOSIS — N18.32 STAGE 3B CHRONIC KIDNEY DISEASE (H): Primary | ICD-10-CM

## 2023-03-01 PROCEDURE — 99214 OFFICE O/P EST MOD 30 MIN: CPT | Performed by: INTERNAL MEDICINE

## 2023-03-01 RX ORDER — DAPAGLIFLOZIN 10 MG/1
10 TABLET, FILM COATED ORAL DAILY
Qty: 90 TABLET | Refills: 1 | Status: SHIPPED | OUTPATIENT
Start: 2023-03-01 | End: 2023-04-12

## 2023-03-01 ASSESSMENT — PAIN SCALES - GENERAL: PAINLEVEL: NO PAIN (0)

## 2023-03-01 NOTE — TELEPHONE ENCOUNTER
Writer called pharmacy, was informed that medication is out of stock but when it comes in, it will be $141 for 3 month supply.

## 2023-03-01 NOTE — TELEPHONE ENCOUNTER
[facility] Call Center    Phone Message    May a detailed message be left on voicemail: no     Reason for Call: Other: pt calling and letting you know she didn't fill her prescription cause it cost over $1000 for pt and she can't afford that, please advise with her     Action Taken: Other: neph    Travel Screening: Not Applicable

## 2023-03-01 NOTE — PATIENT INSTRUCTIONS
It was a pleasure taking care of you today.  I've included a brief summary of our discussion and care plan from today's visit below.  Please review this information with your primary care provider.    My recommendations are summarized as follows:  -Keep the amount of sodium in your diet at 2.4 g/day (also see instructions attached in that regard)  -Keep a Blood Pressure diary by taking your blood pressure twice a day as instructed (also see instructions attached in that regard).Please make sure that you are using a validated blood pressure device by checking that it is the case at: https://www.validatebp.org/  -Avoid all NSAID's. Examples include Ibuprofen (Advil, Motrin), naprosyn (Aleve), celebrex among others. Acetaminophen (Tylenol) is ok with maximum dose in 24 hours of 3200 mg.  -Do not exceed one alcoholic drink per day.  -If for any reason, you are at risk of volume depletion/dehydration (high grade fevers, severe vomiting or diarrhea) stop dapagliflozin till you get better  -Start dapagliflozin 10 mg daily after breakfast  -Once you start dapaglifozin stop glimepiride  -Do labs in 2 weeks from now and again before your next appointment    - Return to Nephrology Clinic in 6 months to review your progress.     To schedule imaging please call [phone]. To schedule your lab appointment at [address] lab call [phone]    Who do I call with any questions after my visit?  Please be in touch if there are any further questions that arise following today's visit.  There are multiple ways to contact your nephrology care team.      During business hours, you may reach your Nephrology RN Care Coordinator, Michelle Carbone at . To schedule or reschedule an appointment, please call [phone].    You can always send a secure message through [employer].  [employer] messages are answered by your nurse or doctor typically within 24 hours.  Please allow extra time on weekends and holidays.       For urgent/emergent questions after business hours, you may reach the on-call Nephrology Fellow by contacting the [facility]  at [phone].     How will I get the results of any tests ordered?    You will receive all of your results.  If you have signed up for [employer], any tests ordered at your visit will be available to you after your physician reviews them.  Typically this takes 1-2 weeks.  If there are urgent results that require a change in your care plan, your physician or nurse will call you to discuss the next steps.      What is [employer]?  [employer] is a secure way for you to access all of your healthcare records from the [facility].  It is a web based computer program, so you can sign on to it from any location.  It also allows you to send secure messages to your care team.  I recommend signing up for [employer] access if you have not already done so and are comfortable with using a computer.      How do I schedule a follow-up visit?  If you did not schedule a follow-up visit today, please call [phone] to schedule a follow-up office visit.      Sincerely,    Lynnette Parks MD  [facility]  Division of Nephrology and Hypertension

## 2023-03-01 NOTE — PROGRESS NOTES
Nephrology Clinic    Moira Andre MRN:[id number] YOB: 1933  Date of Service: 03/01/2023  Primary care provider: Maryan Churchill  Requesting physician: Maryan Churchill      REASON FOR CONSULT: VKD    HISTORY OF PRESENT ILLNESS:   Moira Andre is a 89 year old female who first presented for evaluation of CKD stage 3 in September 2022.  The past medical history is significant for longstanding type 2 DM, HTN and severe obesity with a BMI at 47.34 kg/m2 and CKD since at least 2018 with a baseline creatinine between 1.3 and 1.5 mg/dL. The latest creatinine value done on 2/24 is 1.46 mg/dL. A UACR done on 09/01/2021 is 259.75 mg/g Cr and on 2/24 is 572 mg/g Cr.    A renal ultrasound done on 7/8/2022 shows:  FINDINGS: Exam is limited secondary to patient not being able to hold  his breath and also patient had heavy breathing.     The right kidney measures 11.2 x 5.5 x 5.4 cm. The right renal cortex  measures 1.2 cm in thickness. There is no hydronephrosis. Renal  cortical echogenicity is unremarkable. Hypoechoic cysts are noted in  the superior pole of the right kidney measuring 4.8 x 4.6 x 4.3 cm,  previously 3.5 cm. There is an anechoic simple cyst in the inferior  pole of the left kidney measuring 3.0 x 3.4 x 3.3 cm, previously 3.4  cm. Typically no specific follow-up is needed for this finding.     Spectral waveform analysis was performed. The peak systolic velocities  in the right renal artery at its hilum, mid and origin are 52, 34 and  59 cm/sec respectively. Low resistance waveforms are identified in the  right renal artery. The resistance indices in the right arcuate  arteries range between 0.63-0.75. RAR ratios range between 0.2 to 0.4.     The left kidney measures 10.9 x 5.5 x 5.4 cm. The left renal cortex  measures 1.4 cm in thickness. There is no hydronephrosis. Renal  cortical echogenicity is unremarkable. Simple cysts are noted in the  left kidney measuring up to 3.6 x 2.5 x 3.0 cm.  Typically no specific  follow-up is needed for this finding     Spectral waveform analysis was performed. The peak systolic velocities  in the left renal artery at its hilum, mid and origin are 117, 101 and  122 cm/sec respectively. Low resistance waveforms are identified in  the left renal artery. The resistance indices in the left arcuate  arteries range between 0.54-0.7. RAR ratios range between 0.7 to 0.8.     The peak systolic velocity in the abdominal aorta superior to the  origin of the renal arteries is 144 cm/s.     The bladder is normal.  The patient denies any dysuria, any pollakiuria, any nocturia, any LE edema, any dyspnea on exertion .    The patient's glycemia has been fairly controlled with an Hba1c at 6.7 in June 2022 and at 6.9 on 2/24. On her last visit to her PCP on 2/24 it was decided to stop glimepiride and switch her instead to tirzepatide to help her with weight loss.     The patient's blood pressure has also been fairly well controlled on amlodipine 5 mg daily, metoprolol succinate 100 mg daily and hydralazine 25 mg twice daily.     The patient has chronic generalized pain for which she has been taking acetaminophen and oxycodone. She denies any NSAIDs use. Of note, she was started on ibandronate 150 mg daily in April 2022. She reports limited mobility due to her severe obesity and dyspnea on mild exertion.    PAST MEDICAL HISTORY:  Past Medical History:   Diagnosis Date     Aortic valve sclerosis     heart murmur, no AS     Arrhythmia     PAT, PVC     Aspirin allergy     Plavix use long term     Asthma      CKD (chronic kidney disease) stage 3, GFR 30-59 ml/min (H)     x 2007 atleast     Congestive heart failure, unspecified      Depression      Diabetes mellitus (H) 2010     Diastolic dysfunction, left ventricle 2013    grade 2, nl ef     HTN (hypertension)      Lactic acidosis 08/2018    due to dehydration and metformin     Migraine headache      Mitral stenosis     mild, likely due to  MAC     Myocardial infarction (H) 9/2007, cath 2013 ml    BMS: stent to OM, diag, nl EF, echo /C angia 2013 , f/u cath no lesion >40%     Nephrolithiasis     right side     OA (osteoarthritis) of knee      Obesity      Rheumatoid arthritis flare (H)     prednisone     Sleep apnea     restarted using cpap 2017     TIA (transient ischaemic attack)      Ventral hernia, unspecified, without mention of obstruction or gangrene      PAST SURGICAL HISTORY:  Past Surgical History:   Procedure Laterality Date     APPENDECTOMY       BIOPSY BREAST      x2 -needle & lumpectomy-benign     CHOLECYSTECTOMY       CORONARY ANGIOGRAPHY ADULT ORDER  9/28/2007    Bare metal stent to OM1, Diagonal patent      CORONARY ANGIOGRAPHY ADULT ORDER  9/25/2007    [place] stent to Diagonal     HC LEFT HEART CATHETERIZATION  8/2013    Moderate CAD     HYSTERECTOMY TOTAL ABDOMINAL       ORTHOPEDIC SURGERY      knee replacement on right side (2006), Left side (2016)     RELEASE CARPAL TUNNEL      right and left     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     MEDICATIONS:  Prescription Medications as of 3/1/2023       Rx Number Disp Refills Start End Last Dispensed Date Next Fill Date Owning Pharmacy    acetaminophen (TYLENOL) 500 MG tablet     2/19/2021        Sig: Take 2 tablets (1,000 mg) by mouth 3 times daily    Class: Transitional    Route: Oral    albuterol (PROAIR HFA/PROVENTIL HFA/VENTOLIN HFA) 108 (90 Base) MCG/ACT inhaler  54 g 1 8/27/2021    [id number] [place], [place] - [address] AT [address]    Sig: INHALE 2 PUFFS INTO THE LUNGS EVERY 6 HOURS FOR SHORTNESS OF BREATH    Class: E-Prescribe    Notes to Pharmacy: **Patient requests 90 days supply**    Route: Inhalation    amLODIPine (NORVASC) 5 MG tablet  90 tablet 3 8/27/2021    [facility] #[id number] [place], [place] - [address] AT [address]    Sig: Take 1 tablet (5 mg) by mouth daily for Blood Pressure     Class: E-Prescribe    Route: Oral    atorvastatin (LIPITOR) 20 MG tablet  100 tablet 2 1/3/2023    [facility] - [place], [place] -  [address]    Sig: TAKE 1 TABLET BY MOUTH  DAILY FOR CHOLESTEROL    Class: E-Prescribe    Notes to Pharmacy: Requesting 1 year supply    blood glucose (TRUE METRIX BLOOD GLUCOSE TEST) test strip  100 strip 1 2021    [facility] - [address]    Si strips by In Vitro route daily Use to test blood sugar once times daily or as directed.    Class: E-Prescribe    Route: In Vitro    Blood Glucose Calibration (TRUE METRIX LEVEL 1) Low SOLN  1 each 3 2021    [facility] - [address]    Si each daily as needed (to calibrate blood glucose machine)    Class: E-Prescribe    Route: Does not apply    blood glucose monitoring (NO BRAND SPECIFIED) meter device kit  1 kit 0 6/10/2016    [facility] ([facility]) - [address]    Sig: Use to test blood sugar 1-2 times daily or as directed.    Class: Fax    Notes to Pharmacy: [employer] True Metrix Air Meter    Blood Glucose Monitoring Suppl (TRUE METRIX AIR GLUCOSE METER) w/Device KIT  1 kit 0 2021    [facility] - [address]    Si Units See Admin Instructions    Class: E-Prescribe    Route: Does not apply    buPROPion (WELLBUTRIN SR) 100 MG 12 hr tablet  90 tablet 3 2023    [facility] - [place], [place] - [address]    Sig: Take 1 tablet (100 mg) by mouth daily for mood    Class: E-Prescribe    Notes to Pharmacy: Profile Rx: patient will contact pharmacy when needed    Route: Oral    Cholecalciferol (VITAMIN D) 2000 UNITS CAPS            Sig: Take 2,000 Units by mouth daily     Class: Historical    Route: Oral    clopidogrel (PLAVIX) 75 MG  tablet  100 tablet 2 1/3/2023    [facility] - [place], [place] - [address]    Sig: TAKE 1 TABLET BY MOUTH  DAILY    Class: E-Prescribe    Notes to Pharmacy: Requesting 1 year supply    DULoxetine (CYMBALTA) 60 MG capsule  90 capsule 3 2/24/2023    [facility] - [place], [place] - [address]    Sig: Take 1 capsule (60 mg) by mouth daily    Class: E-Prescribe    Notes to Pharmacy: Profile Rx: patient will contact pharmacy when needed    Route: Oral    fluticasone (FLONASE) 50 MCG/ACT nasal spray            Sig: Spray 1 spray into both nostrils daily as needed     Class: Historical    Route: Both Nostrils    fluticasone-salmeterol (ADVAIR) 100-50 MCG/DOSE inhaler  60 each 3 11/23/2021    [facility] - [place] [address] [facility]    Sig: INHALE 1 PUFF EVERY 12 HOURS    Class: E-Prescribe    fluticasone-salmeterol (WIXELA INHUB) 100-50 MCG/ACT inhaler  180 each 3 2/24/2023    [facility] - [place], [place] - [address]    Sig: USE 1 INHALATION BY MOUTH EVERY  12 HOURS    Class: E-Prescribe    glimepiride (AMARYL) 2 MG tablet  100 tablet 2 8/18/2022    [facility] - [address] [facility]    Sig: TAKE 1 TABLET BY MOUTH IN  THE MORNING BEFORE  BREAKFAST FOR DIABETES    Class: E-Prescribe    Notes to Pharmacy: Requesting 1 year supply    hydrALAZINE (APRESOLINE) 25 MG tablet  180 tablet 1 2/6/2023    [id number] - [place], [place] - [address] AT [facility]    Sig: TAKE 1 TABLET(25 MG) BY MOUTH TWICE DAILY FOR BLOOD PRESSURE    Class: E-Prescribe    IBANdronate (BONIVA) 150 MG tablet  3 tablet 3 4/26/2022    [facility] - [place] [address] [address] [facility]    Sig: TAKE 1 TABLET EVERY 30 DAYS FOR OSTEOPENIA    Class: E-Prescribe    Lidocaine (LIDOCARE) 4 % Patch     2/19/2021        Sig: Place 1 patch onto the skin every 24 hours To prevent lidocaine toxicity, patient should be patch free for 12 hrs daily.    Class: Transitional    Route: Transdermal    Menthol, Topical Analgesic, (ICY HOT EX)            Sig: Apply to affected area every morning    Class: Historical    metoprolol succinate ER (TOPROL XL) 100 MG 24 hr tablet  90 tablet 1 2/2/2023    [facility] - [facility] [address]    Sig: TAKE 1 TABLET BY MOUTH  DAILY    Class: E-Prescribe    Notes to Pharmacy: Requesting 1 year supply    nitroGLYcerin (NITROSTAT) 0.4 MG sublingual tablet  25 tablet 0 3/3/2022    [id number] - [place], [place] - [address] AT [address]    Sig: For chest pain place 1 tablet under the tongue every 5 minutes for 3 doses. If symptoms persist 5 minutes after 1st dose call 911.    Class: Local Print    order for DME  1 each 0 8/15/2019    [id number] - [place] - [address] AT [address] [place] [address]    Sig: Equipment being ordered: diabetic shoes    Class: Local Print    order for DME            Sig: DREAMSTATION  5-15 CM/H20  [employer]     Class: Historical    pantoprazole (PROTONIX) 20 MG EC tablet  90 tablet 3 2/24/2023    [facility] - [place], [place] - [address]    Sig: Take 1 tablet (20 mg) by mouth daily    Class: E-Prescribe    Route: Oral    polyethylene glycol (MIRALAX) 17 g packet    2/21/2021        Sig: Take 17 g by mouth daily    Class: Transitional    Route: Oral    tirzepatide (MOUNJARO) 2.5 MG/0.5ML pen  2 mL 1 2/24/2023    [facility] #[id number] - [place], [place] - [address] AT [address]    Sig: Inject 2.5 mg Subcutaneous every 7 days For diabetes and weight loss    Class: E-Prescribe    Route: Subcutaneous    triamcinolone (KENALOG) 0.1 % external cream  85 g 1 10/14/2022    [facility]) - [place], [place] - [address]    Sig: Apply topically 2 times daily    Class: E-Prescribe    Route: Topical    TRUEplus Lancets 33G MISC  100 each 0 2021    [facility] - [place], [place] - [address]    Si each 2 times daily    Class: E-Prescribe    Route: Does not apply         ALLERGIES:    Allergies   Allergen Reactions     Aspirin Hives     Reaction occurred during childhood.      Lisinopril Cough     Losartan      Hyperkalemia       Metformin      Elevated lactic acid     Minocycline      Yellow Dye Allergy. Minocycline has Yellow Dye #10.     Salicylates Hives     Yellow Dye Hives     Rxn to yellow tablet. Eyes swelled shut.      Yellow Dyes Hives     REVIEW OF SYSTEMS:  Review Of Systems  Skin: positive for dark spots  Eyes: negative for, visual blurring, double vision, glaucoma, cataracts  Ears/Nose/Throat: negative for, nasal congestion, sneezing, postnasal drainage, hearing loss, deafness  Respiratory: No shortness of breath, dyspnea on exertion, cough, or hemoptysis  Cardiovascular: negative for, palpitations, tachycardia, irregular heart beat and chest pain  Gastrointestinal: negative for, poor appetite, dysphagia, nausea and vomiting  Genitourinary: negative for, nocturia, dysuria, frequency, urgency and hesitancy  Musculoskeletal: positive for generalized pain  Neurologic: negative for, syncope, stroke, seizures and paralysis    A comprehensive review of systems was performed and found to be negative except as described here or above.  SOCIAL HISTORY:   Social History     Socioeconomic History     Marital status:      Spouse name: Not on file     Number of children: Not on file     Years of education: Not on file     Highest education level: Not on file   Occupational History     Not on file   Tobacco Use     Smoking status: Former     Packs/day: 0.25     Years: 1.00     Pack years: 0.25     Types: Cigarettes     Start date:       Quit date: 1973     Years since quittin.8     Smokeless tobacco: Never   Vaping Use     Vaping Use: Never used   Substance and Sexual Activity     Alcohol use: Yes     Alcohol/week: 0.0 - 1.0 standard drinks     Comment: rarely     Drug use: No     Sexual activity: Not Currently     Partners: Male   Other Topics Concern     Parent/sibling w/ CABG, MI or angioplasty before 65F 55M? Not Asked      Service Not Asked     Blood Transfusions Not Asked     Caffeine Concern No     Occupational Exposure Not Asked     Hobby Hazards Not Asked     Sleep Concern No     Stress Concern No     Weight Concern No     Special Diet No     Back Care Not Asked     Exercise Yes     Comment: walking, swimming x2 a week     Bike Helmet Not Asked     Seat Belt Yes     Self-Exams Not Asked   Social History Narrative    , 1 step son    Work- clown for profession        Tobacco- none,smoked for couple months in     ETOH- occ 1/2 months    Diet coke- 2-3 cans/day    Exercise- swims 8 laps -3/week             Social Determinants of Health     Financial Resource Strain: Not on file   Food Insecurity: No Food Insecurity     Worried About Running Out of Food in the Last Year: Never true     Ran Out of Food in the Last Year: Never true   Transportation Needs: No Transportation Needs     Lack of Transportation (Medical): No     Lack of Transportation (Non-Medical): No   Physical Activity: Not on file   Stress: Not on file   Social Connections: Not on file   Intimate Partner Violence: Not on file   Housing Stability: Not on file     FAMILY MEDICAL HISTORY:   Family History   Problem Relation Age of Onset     Neurologic Disorder Mother         MS - at 60's     C.A.D. Father          at 8o's, ? prostate ca     Breast Cancer No family hx of      Cancer - colorectal No family hx of      PHYSICAL EXAM:   There were no vitals taken for this visit.  GENERAL APPEARANCE: alert and no distress  EYES:  nonicteric  HENT: mouth without ulcers or lesions  NECK: supple, no adenopathy  RESP: lungs clear to auscultation   CV: regular rhythm, normal rate, no rub  ABDOMEN: soft, nontender, normal bowel sounds, no HSM   Extremities: no clubbing, cyanosis, or edema  MS: no evidence of inflammation in joints, no muscle tenderness  SKIN: no rash  NEURO: mentation intact and speech normal  PSYCH: affect normal/bright   LABS:   Recent Results (from the past 672 hour(s))   Comprehensive metabolic panel    Collection Time: 02/24/23 11:25 AM   Result Value Ref Range    Sodium 138 136 - 145 mmol/L    Potassium 4.5 3.4 - 5.3 mmol/L    Chloride 102 98 - 107 mmol/L    Carbon Dioxide (CO2) 21 (L) 22 - 29 mmol/L    Anion Gap 15 7 - 15 mmol/L    Urea Nitrogen 29.0 (H) 8.0 - 23.0 mg/dL    Creatinine 1.46 (H) 0.51 - 0.95 mg/dL    Calcium 9.7 8.8 - 10.2 mg/dL    Glucose 184 (H) 70 - 99 mg/dL    Alkaline Phosphatase 77 35 - 104 U/L    AST 16 10 - 35 U/L    ALT 10 10 - 35 U/L    Protein Total 7.5 6.4 - 8.3 g/dL    Albumin 4.3 3.5 - 5.2 g/dL    Bilirubin Total 0.3 <=1.2 mg/dL    GFR Estimate 34 (L) >60 mL/min/1.73m2   Albumin Random Urine Quantitative with Creat Ratio    Collection Time: 02/24/23 11:25 AM   Result Value Ref Range    Creatinine Urine mg/dL 84.4 mg/dL    Albumin Urine mg/L 483.0 mg/L    Albumin Urine mg/g Cr 572.27 (H) 0.00 - 25.00 mg/g Cr   UA with Microscopic    Collection Time: 02/24/23 11:25 AM   Result Value Ref Range    Color Urine Yellow Colorless, Straw, Light Yellow, Yellow    Appearance Urine Clear Clear    Glucose Urine Negative Negative mg/dL    Bilirubin Urine Negative Negative    Ketones Urine Negative Negative mg/dL    Specific Gravity Urine 1.025 1.003 - 1.035    Blood Urine Trace (A) Negative    pH Urine 6.0 5.0 - 7.0    Protein Albumin Urine 100 (A) Negative mg/dL    Urobilinogen Urine 0.2 0.2, 1.0 E.U./dL    Nitrite Urine Positive (A) Negative    Leukocyte Esterase Urine Moderate (A) Negative   Hemoglobin A1c     Collection Time: 02/24/23 11:25 AM   Result Value Ref Range    Hemoglobin A1C 6.9 (H) 0.0 - 5.6 %   Ferritin    Collection Time: 02/24/23 11:25 AM   Result Value Ref Range    Ferritin 152 11 - 328 ng/mL   CBC with platelets and differential    Collection Time: 02/24/23 11:25 AM   Result Value Ref Range    WBC Count 11.7 (H) 4.0 - 11.0 10e3/uL    RBC Count 5.31 (H) 3.80 - 5.20 10e6/uL    Hemoglobin 13.5 11.7 - 15.7 g/dL    Hematocrit 47.0 35.0 - 47.0 %    MCV 89 78 - 100 fL    MCH 25.4 (L) 26.5 - 33.0 pg    MCHC 28.7 (L) 31.5 - 36.5 g/dL    RDW 15.3 (H) 10.0 - 15.0 %    Platelet Count 308 150 - 450 10e3/uL    % Neutrophils 79 %    % Lymphocytes 13 %    % Monocytes 6 %    % Eosinophils 1 %    % Basophils 0 %    % Immature Granulocytes 0 %    Absolute Neutrophils 9.3 (H) 1.6 - 8.3 10e3/uL    Absolute Lymphocytes 1.5 0.8 - 5.3 10e3/uL    Absolute Monocytes 0.7 0.0 - 1.3 10e3/uL    Absolute Eosinophils 0.1 0.0 - 0.7 10e3/uL    Absolute Basophils 0.0 0.0 - 0.2 10e3/uL    Absolute Immature Granulocytes 0.0 <=0.4 10e3/uL   Phosphorus    Collection Time: 02/24/23 11:25 AM   Result Value Ref Range    Phosphorus 3.3 2.5 - 4.5 mg/dL   Urine Microscopic Exam    Collection Time: 02/24/23 11:25 AM   Result Value Ref Range    Bacteria Urine Moderate (A) None Seen /HPF    RBC Urine 2-5 (A) 0-2 /HPF /HPF    WBC Urine 10-25 (A) 0-5 /HPF /HPF    WBC Clumps Urine Present (A) None Seen /HPF     CMP  Recent Labs   Lab Test 02/24/23  1125 10/24/22  1245 09/14/22  1356 07/12/22  0948 06/27/22  1131 02/16/22  1601 08/27/21  1136 02/18/21  0600 02/17/21  1758 08/24/20  0951 01/20/20  1111 09/20/19  1323    140 137 140 139   < > 137 136 137 138 137 137   POTASSIUM 4.5 4.9 4.7 4.8 5.5*   < > 4.5 4.2 4.1 4.6 4.6 4.4   CHLORIDE 102 109 106 112* 103   < > 108 108 106 107 108 108   CO2 21* 27 24 19* 24   < > 21 22 24 21 21 22   ANIONGAP 15 4 7 9 12   < > 8 6 7 9 8 7   * 80 129* 163* 133*   < > 127* 136* 82 120* 135* 61*   BUN 29.0*  38* 33* 28 38*   < > 26 34* 38* 21 26 35*   CR 1.46* 1.59* 1.52* 1.33* 1.50*   < > 1.41* 1.50* 1.69* 1.50* 1.37* 1.45*   GFRESTIMATED 34* 31* 33* 38* 33*   < > 34* 31* 27* 31* 35* 33*   GFRESTBLACK  --   --   --   --   --   --   --  36* 31* 36* 40* 38*   TELLY 9.7 9.0 9.0 9.4 9.0   < > 9.3 8.4* 9.4 9.1 9.3 8.9   PHOS 3.3 3.4 3.7  --  3.8   < >  --   --   --   --  3.8  --    PROTTOTAL 7.5  --   --   --   --   --  7.7  --   --  7.4  --  7.2   ALBUMIN 4.3 3.7 3.4  --  3.8   < > 3.6  --   --  3.2* 3.6 3.8   BILITOTAL 0.3  --   --   --   --   --  0.4  --   --  0.3  --  0.2   ALKPHOS 77  --   --   --   --   --  70  --   --  69  --  67   AST 16  --   --   --   --   --  16  --   --  12  --  9   ALT 10  --   --   --   --   --  17  --   --  13  --  16    < > = values in this interval not displayed.     CBC  Recent Labs   Lab Test 02/24/23  1125 10/24/22  1245 09/14/22  1356 03/03/22  1208 02/16/22  1750   HGB 13.5 13.2 12.9 14.1 14.3   WBC 11.7* 9.9  --  10.9 10.7   RBC 5.31* 5.09  --  5.12 5.42*   HCT 47.0 42.7  --  44.3 46.4   MCV 89 84  --  87 86   MCH 25.4* 25.9*  --  27.5 26.4*   MCHC 28.7* 30.9*  --  31.8 30.8*   RDW 15.3* 14.5  --  16.4* 15.6*    338  --  296 320     INR  Recent Labs   Lab Test 07/19/17  0655 09/21/16  1300   INR 0.93 0.99   PTT 29  --      ABGNo lab results found.   URINE STUDIES  Recent Labs   Lab Test 02/24/23  1125 06/27/22  1131 09/01/21  1339 02/18/21  2030 07/04/18  0918 01/15/16  1127   COLOR Yellow Yellow Yellow Yellow   < > Yellow   APPEARANCE Clear Clear Clear Slightly Cloudy   < > Clear   URINEGLC Negative Negative Negative Negative   < > Negative   URINEBILI Negative Negative Negative Negative   < > Negative   URINEKETONE Negative Negative Negative Negative   < > Negative   SG 1.025 1.020 >=1.030 1.021   < > 1.015   UBLD Trace* Moderate* Trace* Large*   < > Negative   URINEPH 6.0 5.0 5.5 5.5   < > 5.0   PROTEIN 100* 30* 100* 10*   < > Negative   UROBILINOGEN 0.2 0.2 0.2  --   --  0.2    NITRITE Positive* Negative Negative Positive*   < > Negative   LEUKEST Moderate* Trace* Small* Small*   < > Negative   RBCU 2-5* 25-50* 0-2 15*   < > O - 2   WBCU 10-25* 0-5 10-25* 4   < > O - 2    < > = values in this interval not displayed.     No lab results found.    ASSESSMENT AND PLAN:   #CKD stage 3b  eGFR around 33-35 mL/min  With tubular range   proteinuria and a UACR around 572  Mg/g on 2/24  A renal ultrasound done in July 2022 shows acquired bilateral cysts only  Her CKD has been fairly stable and the potential responsible factors include  Longstanding type 2 DM, HTN, acquired cystic disease, severe obesity and normal decline related to age. Tirzepatide should be beneficial for the proteinuria and also help with weight loss as at this point it is severe obesity that is mostly concerning in terms of outcomes. I will also initiate dapagliflozin 10 mg daily for the same purpose and recheck a CMP in 2 weeks  No documented intake of NSAIDs however she is on ibandronate and her renal function will need to be monitored on that   The patient was also instructed to keep the sodium intake around 2400 mg /day, follow a plant-based diet and to avoid NSAIDs     #HTN  Primary and secondary to CKD. Fair control on amlodipine 5 mg daily, metoprolol succinate 100 mg daily and hydralazine 25 mg twice daily.  The addition of dapagliflozin should also improve this.    #Type 2 DM   Hba1c 6.7-> 6.9 management as per above    #CVD/dyslipidemia  On a statin as indicated for any patient with CKD above the age of 50 and well controlled     #Blood count  Hemoglobin 12.9 -> 13.5  No need for any additional work up or intervention    #Acid-base status  CO2 level 21. If remains will start her on oral bicarbonate therapy at her next visit    #Electrolytes  Na 140  K 4.8  No acute issues     #BMD on ibandronate 150 mg  Once monthly for osteoporosis  Ca   9       P  3.8   Alb 3.8  Vitamin D level  39 in Sep 2022 on cholecalciferol  2000U daily. Pursue current supplementation    #CKD journey/transplant not a candidate yet     The total time of this encounter amounted to 30 minutes on the day of the encounter. This time included time spent with the patient, reviewing records, ordering tests, and performing post visit documentation.     The patient will return to follow up in 6 months    Lynnette Parks MD  Division of Renal Disease and Hypertension

## 2023-03-01 NOTE — NURSING NOTE
"Chief Complaint   Patient presents with     RECHECK     Stage 3b chronic kidney disease        Vitals:    03/01/23 0927   BP: 124/76   BP Location: Left arm   Patient Position: Sitting   Cuff Size: Adult Large   Pulse: 86   SpO2: 95%   Weight: 130.4 kg (287 lb 8 oz)   Height: 1.651 m (5' 5\")       Body mass index is 47.84 kg/m .    Jolly Whittaker, VERONICAF  "

## 2023-03-02 ENCOUNTER — ANCILLARY PROCEDURE (OUTPATIENT)
Dept: BONE DENSITY | Facility: CLINIC | Age: 88
End: 2023-03-02
Attending: INTERNAL MEDICINE
Payer: COMMERCIAL

## 2023-03-02 ENCOUNTER — TELEPHONE (OUTPATIENT)
Dept: FAMILY MEDICINE | Facility: CLINIC | Age: 88
End: 2023-03-02

## 2023-03-02 DIAGNOSIS — Z78.0 MENOPAUSE: ICD-10-CM

## 2023-03-02 DIAGNOSIS — E11.21 TYPE 2 DIABETES MELLITUS WITH DIABETIC NEPHROPATHY, WITHOUT LONG-TERM CURRENT USE OF INSULIN (H): Primary | ICD-10-CM

## 2023-03-02 DIAGNOSIS — Z78.0 ASYMPTOMATIC POSTMENOPAUSAL STATUS: ICD-10-CM

## 2023-03-02 DIAGNOSIS — M85.80 OSTEOPENIA WITH HIGH RISK OF FRACTURE: ICD-10-CM

## 2023-03-02 PROCEDURE — 77081 DXA BONE DENSITY APPENDICULR: CPT | Performed by: INTERNAL MEDICINE

## 2023-03-02 PROCEDURE — 77080 DXA BONE DENSITY AXIAL: CPT | Performed by: INTERNAL MEDICINE

## 2023-03-02 NOTE — CONFIDENTIAL NOTE
Please let the patient know to make an appointment with medical therapy management team [facility]  They should be able to teach her how to do Mounjaro  Please provide the phone number to schedule the appointment    Dr.Nasima Kerline MD

## 2023-03-02 NOTE — LETTER
2023      Kristyn Andre  [address]  [facility] [address]        Dear ,    We are writing to inform you of your test results.      Your bone density shows osteopenia   But there is good improvement in your bone density   It is very important you continue to take adequate calcium and vitamin D   Continue to do weightbearing exercise like walking   We will repeat your bone density in 3 years       Resulted Orders   DX Hip/Pelvis/Spine    Narrative    BONE DENSITOMETRY  [facility]  [address] [address]  3/2/2023      PATIENT: Moira Andre  CHART: [id number]   :  1933  AGE:  89 year old  SEX:  female   REFERRING PROVIDER:  Kerline     PROCEDURE:  Bone density scanning was performed using DXA technology of   the lumbar spine and hip.  Scanning was performed on a [employer]   scanner.  Reporting is completed in the form of a T-score.  The T-score   represents the standard deviation from peak bone mass based on a young   healthy adult.     REFERENCE T-SCORES:       Normal                -1.0 and greater                                 Osteopenia         Between -1.0 and -2.5                                             Osteoporosis     -2.5 and less                                       RISK FACTORS:  Post-menopausal,  Height loss of 3.75 inches, follow up Low   Bone Density (osteopenia)    CURRENT TREATMENT:  Boniva     FINDINGS:               Lumbar Spine (L2-L4)      T-score:  2.1, marked degenerative   changes present, most marked at L1, so only L2-4 are evaluated.                Left Femoral Neck            T-score:  -1.7               Right Femoral Neck          T-score:  -2.2               Forearm (radius 33%)      T-score:  -0.5                  Lumbar (L2-L4) BMD: 1.477  Previous: 1.389                Left Total Hip BMD: 0.898  Previous: 0.853, part of the   right proximal femur is not included so only the left hip is  compared                Forearm (radius 33%) BMD: 0.680     Comparison is made to another DXA performed on a different [employer] machine   on 07/21/2015.      IMPRESSION  Low Bone Density (osteopenia).  Degenerative changes at the lumbar spine   may falsely elevate results.     Comparisons from different scanners that have not been cross calibrated,   are not necessarily valid. Such a comparison has been performed here; one   should interpret with caution.   There has been probable significant increase in bone density of the lumbar   spine. There has been a trend toward significant increase in bone density   of the hip. The forearm was not included on the previous study so   comparison is not possible.      Recommendations include ensuring adequate Calcium and Vitamin D.    Follow up can be considered in 3 years.   ___________________  Jeni Haider M.D.  Electronically signed          If you have any questions or concerns, please call the clinic at the number listed above.       Sincerely,      Maryan Churchill MD

## 2023-03-02 NOTE — TELEPHONE ENCOUNTER
Dr. Churchill, patient picked up  Mounjaro  Medication for her diabetes.     She is looking for someone to help her take the injections. Is his something the clinic RN can help with or the diabetic educator ?     Xiomy Tellez RN  [facility]

## 2023-03-03 NOTE — CONFIDENTIAL NOTE
Spoke to patient and she is aware of the MTM appointment on 3/28/2023.     Will close encounter.     Xiomy Tellez RN  [facility]

## 2023-03-07 NOTE — RESULT ENCOUNTER NOTE
Please notify patient by sending following letter with copy of test results      Mert Pizarro,    This is to inform you regarding your test result.    Your bone density shows osteopenia  But there is good improvement in your bone density  It is very important you continue to take adequate calcium and vitamin D  Continue to do weightbearing exercise like walking  We will repeat your bone density in 3 years    Sincerely,      Dr.Nasima Kerline MD,FACP

## 2023-03-09 ENCOUNTER — LAB (OUTPATIENT)
Dept: LAB | Facility: CLINIC | Age: 88
End: 2023-03-09
Payer: COMMERCIAL

## 2023-03-09 DIAGNOSIS — N18.32 STAGE 3B CHRONIC KIDNEY DISEASE (H): ICD-10-CM

## 2023-03-09 LAB
ALBUMIN SERPL BCG-MCNC: 4.1 G/DL (ref 3.5–5.2)
ALP SERPL-CCNC: 74 U/L (ref 35–104)
ALT SERPL W P-5'-P-CCNC: 8 U/L (ref 10–35)
ANION GAP SERPL CALCULATED.3IONS-SCNC: 14 MMOL/L (ref 7–15)
AST SERPL W P-5'-P-CCNC: 16 U/L (ref 10–35)
BILIRUB SERPL-MCNC: 0.3 MG/DL
BUN SERPL-MCNC: 28 MG/DL (ref 8–23)
CALCIUM SERPL-MCNC: 9.6 MG/DL (ref 8.8–10.2)
CHLORIDE SERPL-SCNC: 103 MMOL/L (ref 98–107)
CREAT SERPL-MCNC: 1.49 MG/DL (ref 0.51–0.95)
CREAT UR-MCNC: 101 MG/DL
DEPRECATED HCO3 PLAS-SCNC: 22 MMOL/L (ref 22–29)
GFR SERPL CREATININE-BSD FRML MDRD: 33 ML/MIN/1.73M2
GLUCOSE SERPL-MCNC: 132 MG/DL (ref 70–99)
MICROALBUMIN UR-MCNC: 623 MG/L
MICROALBUMIN/CREAT UR: 616.83 MG/G CR (ref 0–25)
POTASSIUM SERPL-SCNC: 5.1 MMOL/L (ref 3.4–5.3)
PROT SERPL-MCNC: 7.4 G/DL (ref 6.4–8.3)
SODIUM SERPL-SCNC: 139 MMOL/L (ref 136–145)

## 2023-03-09 PROCEDURE — 36415 COLL VENOUS BLD VENIPUNCTURE: CPT

## 2023-03-09 PROCEDURE — 82570 ASSAY OF URINE CREATININE: CPT

## 2023-03-09 PROCEDURE — 82043 UR ALBUMIN QUANTITATIVE: CPT

## 2023-03-09 PROCEDURE — 80053 COMPREHEN METABOLIC PANEL: CPT

## 2023-03-27 ENCOUNTER — OFFICE VISIT (OUTPATIENT)
Dept: PHARMACY | Facility: CLINIC | Age: 88
End: 2023-03-27
Attending: INTERNAL MEDICINE
Payer: COMMERCIAL

## 2023-03-27 VITALS — WEIGHT: 289 LBS | BODY MASS INDEX: 48.09 KG/M2

## 2023-03-27 DIAGNOSIS — E11.9 TYPE 2 DIABETES MELLITUS WITHOUT COMPLICATION, WITHOUT LONG-TERM CURRENT USE OF INSULIN (H): Primary | ICD-10-CM

## 2023-03-27 PROCEDURE — 99207 PR NO CHARGE LOS: CPT | Performed by: PHARMACIST

## 2023-03-27 NOTE — PATIENT INSTRUCTIONS
"Recommendations from today's MTM visit:                                                       You did your first dose of Mounjaro here in clinic today.  Make sure you store this in the refrigerator.  I will call you on Wednesday, April 12th at 1pm to check in and we can increase the dose if needed/deisred    Follow-up: Return in 16 days (on 4/12/2023) for diabetes follow-up via phone at 1pm.    It was great speaking with you today.  I value your experience and would be very thankful for your time in providing feedback in our clinic survey. In the next few days, you may receive an email or text message from [facility] with a link to a survey related to your  clinical pharmacist.\"     To schedule another MTM appointment, please call the clinic directly or you may call the MTM scheduling line at [phone] or toll-free at [phone].     My Clinical Pharmacist's contact information:                                                      Please feel free to contact me with any questions or concerns you have.      Liberty Morin, PharmD, BCACP  Medication Therapy Management Provider  Pager: [phone]   "

## 2023-03-27 NOTE — PROGRESS NOTES
Clinical Pharmacy Consult:                                                    Moira Andre is a 89 year old female coming in for a clinical pharmacist consult.  She was referred to me from Dr. Churchill.     Reason for Consult: Mounjaro start    Discussion: Patient brings Mounjaro to our visit today to complete first injection.  She's not interested in doing a full MTM review today.  Patient denies personal history of pancreatitis and personal/family history of MEN/MTC.     Plan:  1. Reviewed Mounjaro mechanism of action, injection technique and storage instructions today.  2. Patient was able to complete her first dose of Mounjaro 2.5mg here in clinic today.  3. Will follow-up 4/12 at 1pm via phone to determine if she wishes to increase dose to 5mg.    Liberty Morin, PharmD, [facility]  Medication Therapy Management Provider  Pager: [phone]

## 2023-04-12 ENCOUNTER — VIRTUAL VISIT (OUTPATIENT)
Dept: PHARMACY | Facility: CLINIC | Age: 88
End: 2023-04-12
Payer: COMMERCIAL

## 2023-04-12 DIAGNOSIS — E11.9 TYPE 2 DIABETES MELLITUS WITHOUT COMPLICATION, WITHOUT LONG-TERM CURRENT USE OF INSULIN (H): Primary | ICD-10-CM

## 2023-04-12 DIAGNOSIS — K21.9 GASTROESOPHAGEAL REFLUX DISEASE WITHOUT ESOPHAGITIS: ICD-10-CM

## 2023-04-12 DIAGNOSIS — M81.0 OSTEOPOROSIS, UNSPECIFIED OSTEOPOROSIS TYPE, UNSPECIFIED PATHOLOGICAL FRACTURE PRESENCE: ICD-10-CM

## 2023-04-12 DIAGNOSIS — J45.909 UNCOMPLICATED ASTHMA, UNSPECIFIED ASTHMA SEVERITY, UNSPECIFIED WHETHER PERSISTENT: ICD-10-CM

## 2023-04-12 DIAGNOSIS — F32.1 MODERATE MAJOR DEPRESSION (H): ICD-10-CM

## 2023-04-12 DIAGNOSIS — R21 RASH: ICD-10-CM

## 2023-04-12 DIAGNOSIS — M17.9 OSTEOARTHRITIS OF KNEE, UNSPECIFIED LATERALITY, UNSPECIFIED OSTEOARTHRITIS TYPE: ICD-10-CM

## 2023-04-12 DIAGNOSIS — I24.9 ACS (ACUTE CORONARY SYNDROME) (H): ICD-10-CM

## 2023-04-12 DIAGNOSIS — E78.5 HYPERLIPIDEMIA LDL GOAL <70: ICD-10-CM

## 2023-04-12 DIAGNOSIS — J30.2 SEASONAL ALLERGIC RHINITIS, UNSPECIFIED TRIGGER: ICD-10-CM

## 2023-04-12 DIAGNOSIS — I10 ESSENTIAL HYPERTENSION: ICD-10-CM

## 2023-04-12 PROCEDURE — 99207 PR NO CHARGE LOS: CPT

## 2023-04-12 RX ORDER — ACETAMINOPHEN 500 MG
1000 TABLET ORAL 2 TIMES DAILY
Status: ON HOLD | COMMUNITY
End: 2024-08-07

## 2023-04-12 RX ORDER — ALUMINA, MAGNESIA, AND SIMETHICONE 2400; 2400; 240 MG/30ML; MG/30ML; MG/30ML
2 SUSPENSION ORAL PRN
COMMUNITY
End: 2023-10-10

## 2023-04-12 RX ORDER — TIRZEPATIDE 5 MG/.5ML
5 INJECTION, SOLUTION SUBCUTANEOUS
Qty: 2 ML | Refills: 0 | Status: SHIPPED | OUTPATIENT
Start: 2023-04-12 | End: 2023-05-11 | Stop reason: DRUGHIGH

## 2023-04-12 RX ORDER — FEXOFENADINE HCL 60 MG/1
60 TABLET, FILM COATED ORAL DAILY
COMMUNITY

## 2023-04-12 NOTE — PROGRESS NOTES
Medication Therapy Management (MTM) Encounter    ASSESSMENT:                            Medication Adherence/Access: No issues identified    Hypertension/CAD: Patient's blood pressures at clinic have been at goal of <130/80 despite not being on amlodipine therapy, would recommend patient check blood pressures at home to get a better understanding of current blood pressures without amlodipine therapy.     Type 2 Diabetes:  Patient's A1c has been consistently at goal of <7% despite not being on Farxiga therapy. Would benefit from a dose increase of Mounjaro to reach a maintenance therapy dose that will help to keep blood sugars at goal    Hyperlipidemia: Stable, LDL at goal of <70.     Depression: Stable.     Osteoporosis: If patient has consistently been taking ibandronate since 2016 patient may benefit from a drug holiday per AACE guidelines, plan to clarify patient's history of ibandronate at follow up. While not able to discuss, patient is likely not meeting calcium intake recommendation of 1,000-1,200mg/day, plan to assess at follow up.     Allergies: Stable.     Asthma: Unable to obtain updated ACT today due to time, plan to do so at follow up.    GERD: Stable.      Rash: Stable.     Pain: Stable.     PLAN:                            1. Increase Mounjaro to 5mg weekly.    2. I would recommend checking your blood pressure once daily, record readings and we will go over them at follow up visit.     Future considerations: ibandronate drug holiday, calcium intake, ACT    Follow-up: f/u visit scheduled for 5/4/2023    SUBJECTIVE/OBJECTIVE:                          Moira Andre is a 89 year old female called for a follow-up visit.  Today's visit is a follow-up MTM visit from 3/27/2023.     Reason for visit: Mounjaro follow up.    Allergies/ADRs: Reviewed in chart  Past Medical History: Reviewed in chart  Tobacco: She reports that she quit smoking about 50 years ago. Her smoking use included cigarettes. She started  smoking about 51 years ago. She has a 0.25 pack-year smoking history. She has never used smokeless tobacco.  Alcohol: Unable to assess today.     Medication Adherence/Access: no issues reported    Hypertension/CAD: Current medications include hydralazine 25mg twice daily, metoprolol succinate 100mg daily, nitroglycerin as needed- has not needed to use- denies chest pain, clopidogrel 75mg daily. Was prescribed amlodipine but per dispense report has not been filled in over a year & patient does not have in medication box.  Patient does not self-monitor blood pressure.  Patient reports no current medication side effects.  BP Readings from Last 3 Encounters:   03/01/23 124/76   02/24/23 130/79   10/24/22 130/70     Type 2 Diabetes:  Currently taking Mounjaro 2.5mg weekly (has had 3 doses now). Patient is not experiencing side effects. Was prescribed Farxiga but she notes that this prescription was too expensive to start so she never picked it up. Per chart review was originally prescribed by nephrology. Was previously taking glimepiride but stopped once Mounjaro therapy was started.   Blood sugar monitoring: never.  Symptoms of low blood sugar? none  Symptoms of high blood sugar? none  Eye exam: up to date  Foot exam: due  Diet/Exercise: Did not assess today due to time.   Aspirin: Not taking due to age/taking clopidogrel  Statin: Yes: atorvastatin 20mg daily   ACEi/ARB: No- previous intolerances.   Urine Albumin:   Lab Results   Component Value Date    UMALCR 616.83 (H) 03/09/2023      Lab Results   Component Value Date    A1C 6.9 02/24/2023    A1C 6.8 10/24/2022    A1C 6.7 06/27/2022    A1C 7.2 03/03/2022    A1C 6.6 08/27/2021    A1C 6.2 08/24/2020    A1C 6.5 01/20/2020    A1C 6.1 09/20/2019    A1C 6.1 06/19/2019    A1C 6.4 03/04/2019     Hyperlipidemia: Current therapy includes atorvastatin 20mg daily.  Patient reports no significant myalgias or other side effects.  The ASCVD Risk score (Abena KAUR, et al., 2019)  failed to calculate for the following reasons:    The 2019 ASCVD risk score is only valid for ages 40 to 79  Recent Labs   Lab Test 10/24/22  1245 08/27/21  1136 02/09/16  0809 10/13/15  1008 03/11/15  1018   CHOL 145 192   < > 126 132   HDL 44* 53   < > 54 53   LDL 52 84   < > 44 45   TRIG 246* 277*   < > 140 170*   CHOLHDLRATIO  --   --   --  2.3 2.5    < > = values in this interval not displayed.     Depression: Current therapy includes bupropion SR 100mg daily and duloxetine 60mg daily. Finds that these have been helpful, sometimes gets lonely and depressed once in awhile. Denies suicidal thoughts today.     Osteoporosis: Current therapy includes: ibandronate (Boniva) 150mg monthly (per chart review has been on current since 2016) also taking vitmin D 2000 units daily. Patient is not experiencing side effects.  DEXA History: 3/2/2023:   FINDINGS:  Lumbar Spine (L2-L4) T-score: 2.1, marked degenerative changes present, most marked at L1, so only L2-4 are evaluated.   Left Femoral Neck T-score: -1.7  Right Femoral Neck T-score: -2.2  Forearm (radius 33%) T-score: -0.5   Lumbar (L2-L4) BMD: 1.477 Previous: 1.389  Left Total Hip BMD: 0.898 Previous: 0.853, part of the right proximal femur is not included so only the left hip is compared  Forearm (radius 33%) BMD: 0.680  Comparison is made to another DXA performed on a different [employer] machine on 07/21/2015.   Patient is getting the following sources of dietary calcium: Due to time was not able to assess  Risk factors: chronic PPI use  Last vitamin D level:  Lab Results   Component Value Date    VITDT 39 09/14/2022     Allergies: Current therapy includes Allegra 180mg daily and also uses Flonase 1 spray into both nostrils daily as needed for breakthrough symptoms.     Asthma: Current medications: prescribed Wixela 100-50 inhaler 1 puff twice daily, and albuterol as needed used twice in the last week. Patient rinses their mouth after using steroid inhaler. Feels that  breathing is controlled.       2/24/2022    10:05 AM 3/3/2022    10:54 AM 10/24/2022    11:42 AM   ACT Total Scores   ACT TOTAL SCORE (Goal Greater than or Equal to 20) 20 18 21   In the past 12 months, how many times did you visit the emergency room for your asthma without being admitted to the hospital? 0 0 0   In the past 12 months, how many times were you hospitalized overnight because of your asthma? 0 0 0     GERD: Current medications include: Protonix (pantoprazole) 20mg once daily. Patient reports no current symptoms.  Patient feels that current regimen is effective most of the time, sometimes has to take an antacid to help with breakthrough (couple times a month).     Rash: Current therapy includes triamcinolone cream a couple times a week for skin under hernia. Finds this effective when needed.      Pain: Acetaminophen 1000mg twice daily and icy hot spray which she uses daily- mentions that this effective for lower back pain, will also sometimes use Salon paw 1 patches on fingers but use is very rare.     Today's Vitals: There were no vitals taken for this visit.  ----------------  I spent 35 minutes with this patient today. All changes were made via collaborative practice agreement with Maryan Churchill MD. A copy of the visit note was provided to the patient's provider(s).    A summary of these recommendations was sent via [employer].    Lucy Lorenz PharmD  Medication Therapy Management Resident  [phone]    The patient was seen independently by Dr. Lorenz  I have read the note and agree with the assessment and plan.  Liberty Morin PharmD, [facility]  Medication Therapy Management Provider  Pager: [phone]      Telemedicine Visit Details  Type of service:  Telephone visit  Start Time: 1:01 PM  End Time: 1:36 PM     Medication Therapy Recommendations  Type 2 diabetes mellitus with diabetic nephropathy (H)    Current Medication: tirzepatide (MOUNJARO) 2.5 MG/0.5ML pen (Discontinued)   Rationale: Dose  too low - Dosage too low - Effectiveness   Recommendation: Increase Dose   Status: Accepted per CPA

## 2023-04-13 ENCOUNTER — HOSPITAL ENCOUNTER (EMERGENCY)
Facility: CLINIC | Age: 88
Discharge: LEFT BEFORE DISCHARGE | End: 2023-04-13
Admitting: EMERGENCY MEDICINE
Payer: COMMERCIAL

## 2023-04-13 ENCOUNTER — OFFICE VISIT (OUTPATIENT)
Dept: URGENT CARE | Facility: URGENT CARE | Age: 88
End: 2023-04-13
Payer: COMMERCIAL

## 2023-04-13 ENCOUNTER — NURSE TRIAGE (OUTPATIENT)
Dept: FAMILY MEDICINE | Facility: CLINIC | Age: 88
End: 2023-04-13
Payer: COMMERCIAL

## 2023-04-13 VITALS
DIASTOLIC BLOOD PRESSURE: 82 MMHG | SYSTOLIC BLOOD PRESSURE: 150 MMHG | OXYGEN SATURATION: 94 % | RESPIRATION RATE: 20 BRPM | HEART RATE: 122 BPM | TEMPERATURE: 97.4 F

## 2023-04-13 VITALS
HEART RATE: 134 BPM | OXYGEN SATURATION: 95 % | WEIGHT: 280 LBS | TEMPERATURE: 98.8 F | DIASTOLIC BLOOD PRESSURE: 78 MMHG | SYSTOLIC BLOOD PRESSURE: 144 MMHG | BODY MASS INDEX: 46.59 KG/M2

## 2023-04-13 DIAGNOSIS — R61 DIAPHORESIS: ICD-10-CM

## 2023-04-13 DIAGNOSIS — R19.7 DIARRHEA, UNSPECIFIED TYPE: ICD-10-CM

## 2023-04-13 DIAGNOSIS — R00.0 TACHYCARDIA: ICD-10-CM

## 2023-04-13 DIAGNOSIS — R11.2 NAUSEA AND VOMITING, UNSPECIFIED VOMITING TYPE: Primary | ICD-10-CM

## 2023-04-13 LAB
BASOPHILS # BLD AUTO: 0 10E3/UL (ref 0–0.2)
BASOPHILS NFR BLD AUTO: 0 %
EOSINOPHIL # BLD AUTO: 0.2 10E3/UL (ref 0–0.7)
EOSINOPHIL NFR BLD AUTO: 1 %
ERYTHROCYTE [DISTWIDTH] IN BLOOD BY AUTOMATED COUNT: 15.5 % (ref 10–15)
HCT VFR BLD AUTO: 47.2 % (ref 35–47)
HGB BLD-MCNC: 14.2 G/DL (ref 11.7–15.7)
IMM GRANULOCYTES # BLD: 0.1 10E3/UL
IMM GRANULOCYTES NFR BLD: 1 %
LYMPHOCYTES # BLD AUTO: 1.6 10E3/UL (ref 0.8–5.3)
LYMPHOCYTES NFR BLD AUTO: 13 %
MCH RBC QN AUTO: 24.9 PG (ref 26.5–33)
MCHC RBC AUTO-ENTMCNC: 30.1 G/DL (ref 31.5–36.5)
MCV RBC AUTO: 83 FL (ref 78–100)
MONOCYTES # BLD AUTO: 1 10E3/UL (ref 0–1.3)
MONOCYTES NFR BLD AUTO: 8 %
NEUTROPHILS # BLD AUTO: 9.3 10E3/UL (ref 1.6–8.3)
NEUTROPHILS NFR BLD AUTO: 77 %
NRBC # BLD AUTO: 0 10E3/UL
NRBC BLD AUTO-RTO: 0 /100
PLATELET # BLD AUTO: 376 10E3/UL (ref 150–450)
RBC # BLD AUTO: 5.71 10E6/UL (ref 3.8–5.2)
WBC # BLD AUTO: 12.1 10E3/UL (ref 4–11)

## 2023-04-13 PROCEDURE — 99283 EMERGENCY DEPT VISIT LOW MDM: CPT

## 2023-04-13 PROCEDURE — 258N000003 HC RX IP 258 OP 636: Performed by: EMERGENCY MEDICINE

## 2023-04-13 PROCEDURE — 85025 COMPLETE CBC W/AUTO DIFF WBC: CPT | Performed by: EMERGENCY MEDICINE

## 2023-04-13 PROCEDURE — 36415 COLL VENOUS BLD VENIPUNCTURE: CPT | Performed by: EMERGENCY MEDICINE

## 2023-04-13 PROCEDURE — 96361 HYDRATE IV INFUSION ADD-ON: CPT

## 2023-04-13 PROCEDURE — 96360 HYDRATION IV INFUSION INIT: CPT

## 2023-04-13 PROCEDURE — 99207 PR NO CHARGE LOS: CPT | Mod: 25 | Performed by: PHYSICIAN ASSISTANT

## 2023-04-13 RX ADMIN — SODIUM CHLORIDE 1000 ML: 9 INJECTION, SOLUTION INTRAVENOUS at 18:04

## 2023-04-13 ASSESSMENT — ACTIVITIES OF DAILY LIVING (ADL): ADLS_ACUITY_SCORE: 35

## 2023-04-13 NOTE — PATIENT INSTRUCTIONS
"Recommendations from today's MTM visit:                                                      1. Increase Mounjaro to 5mg weekly.    2. I would recommend checking your blood pressure once daily, record readings and we will go over them at follow up visit.     Future considerations: ibandronate drug holiday    Follow-up: f/u visit scheduled for 5/4/2023  It was great speaking with you today.  I value your experience and would be very thankful for your time in providing feedback in our clinic survey. In the next few days, you may receive an email or text message from [employer] with a link to a survey related to your  clinical pharmacist.\"     To schedule another MTM appointment, please call the clinic directly or you may call the MTM scheduling line at [phone] or toll-free at [phone].     My Clinical Pharmacist's contact information:                                                      Please feel free to contact me with any questions or concerns you have.      Lucy Lorenz, PharmD  Medication Therapy Management Resident  [phone]   "

## 2023-04-13 NOTE — ED NOTES
"Rapid Assessment Note    History:   Moira Andre is an 89 year old female who presents with 4 days of nausea with dry heaves last night and frequent dark, watery, nonbloody diarrhea.  She has no fever but has been diaphoretic.  She describes her abdomen is \"tired\" without pain.  She was referred to the emergency department by urgent care who told her she was likely dehydrated.    Exam:   General:  Alert, interactive  Cardiovascular:  Well perfused, tachycardic  Lungs:  No respiratory distress, no accessory muscle use  Abdomen: Obese, nontender within limitations of exam  Neuro:  Moving all 4 extremities  Skin:  Warm, dry  Psych:  Normal affect    Plan of Care:   I evaluated the patient and developed an initial plan of care. I discussed this plan and explained that I, or one of my partners, would be returning to complete the evaluation.     This 89-year-old woman presents to the emergency department with 4 days of nausea and diarrhea.  No significant abdominal pain.  No documented fever.  She is tachycardic.  Laboratory studies including stool studies ordered.  Fluids ordered.  Would recommend repeat abdominal exam to ensure she does not need imaging.    4/13/2023  [facility]       Nichol العلي MD  04/13/23 1759    "

## 2023-04-13 NOTE — PROGRESS NOTES
SUBJECTIVE:   Moira Andre is a 89 year old female presenting with a chief complaint of   Chief Complaint   Patient presents with     Diarrhea     Nausea, fatigue x 4 days        She is an established patient of [facility].  Patient presents with complaints of nausea, vomiting and diarrhea.  She is sweating profusely and appears ill.  Brief exam revelas tenting and she is tachycardia         Review of Systems    Past Medical History:   Diagnosis Date     Aortic valve sclerosis     heart murmur, no AS     Arrhythmia     PAT, PVC     Aspirin allergy     Plavix use long term     Asthma      CKD (chronic kidney disease) stage 3, GFR 30-59 ml/min (H)     x  atleast     Congestive heart failure, unspecified      Depression      Diabetes mellitus (H)      Diastolic dysfunction, left ventricle     grade 2, nl ef     HTN (hypertension)      Lactic acidosis 2018    due to dehydration and metformin     Migraine headache      Mitral stenosis     mild, 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 Father          at 8o's, ? prostate ca     Breast Cancer No family hx of      Cancer - colorectal No family hx of      Current Outpatient Medications   Medication Sig Dispense Refill     acetaminophen (TYLENOL) 500 MG tablet Take 1,000 mg by mouth 2 times daily       Alum & Mag Hydroxide-Simeth (ANTACID ADVANCED PO) Take 2 tablets by mouth as needed (HEARTBURN)       atorvastatin (LIPITOR) 20 MG tablet TAKE 1  TABLET BY MOUTH  DAILY FOR CHOLESTEROL 100 tablet 2     buPROPion (WELLBUTRIN SR) 100 MG 12 hr tablet Take 1 tablet (100 mg) by mouth daily for mood 90 tablet 3     Camphor-Menthol-Methyl Sal (SALONPAS EX) Externally apply 1 patch topically as needed (hand pain)       clopidogrel (PLAVIX) 75 MG tablet TAKE 1 TABLET BY MOUTH  DAILY 100 tablet 2     DULoxetine (CYMBALTA) 60 MG capsule Take 1 capsule (60 mg) by mouth daily 90 capsule 3     fexofenadine (ALLEGRA) 180 MG tablet Take 180 mg by mouth daily       fluticasone (FLONASE) 50 MCG/ACT nasal spray Spray 1 spray into both nostrils daily as needed        fluticasone-salmeterol (WIXELA INHUB) 100-50 MCG/ACT inhaler USE 1 INHALATION BY MOUTH EVERY  12 HOURS 180 each 3     hydrALAZINE (APRESOLINE) 25 MG tablet TAKE 1 TABLET(25 MG) BY MOUTH TWICE DAILY FOR BLOOD PRESSURE 180 tablet 1     IBANdronate (BONIVA) 150 MG tablet TAKE 1 TABLET EVERY 30 DAYS FOR OSTEOPENIA 3 tablet 3     Menthol, Topical Analgesic, (ICY HOT EX) Apply to affected area every morning       metoprolol succinate ER (TOPROL XL) 100 MG 24 hr tablet TAKE 1 TABLET BY MOUTH  DAILY 90 tablet 1     Multiple Vitamins-Minerals (HAIR SKIN AND NAILS FORMULA PO) Take 1 tablet by mouth daily       nitroGLYcerin (NITROSTAT) 0.4 MG sublingual tablet For chest pain place 1 tablet under the tongue every 5 minutes for 3 doses. If symptoms persist 5 minutes after 1st dose call 911. 25 tablet 0     pantoprazole (PROTONIX) 20 MG EC tablet Take 1 tablet (20 mg) by mouth daily 90 tablet 3     tirzepatide (MOUNJARO) 5 MG/0.5ML pen Inject 5 mg Subcutaneous every 7 days 2 mL 0     triamcinolone (KENALOG) 0.1 % external cream Apply topically 2 times daily (Patient taking differently: Apply topically as needed for irritation) 85 g 1     Vitamin D3 (CHOLECALCIFEROL) 25 mcg (1000 units) tablet Take 100 Units by mouth daily       albuterol (PROAIR HFA/PROVENTIL HFA/VENTOLIN HFA) 108 (90 Base) MCG/ACT inhaler INHALE 2 PUFFS INTO  THE LUNGS EVERY 6 HOURS FOR SHORTNESS OF BREATH (Patient not taking: Reported on 2023) 54 g 1     Social History     Tobacco Use     Smoking status: Former     Packs/day: 0.25     Years: 1.00     Pack years: 0.25     Types: Cigarettes     Start date:      Quit date: 1973     Years since quittin.0     Smokeless tobacco: Never   Vaping Use     Vaping status: Never Used   Substance Use Topics     Alcohol use: Yes     Alcohol/week: 0.0 - 1.0 standard drinks of alcohol     Comment: rarely       OBJECTIVE  BP (!) 144/78   Pulse (!) 134   Temp 98.8  F (37.1  C) (Tympanic)   Wt 127 kg (280 lb)   SpO2 95%   BMI 46.59 kg/m      Physical Exam  Vitals reviewed.   Constitutional:       Appearance: She is obese. She is ill-appearing and diaphoretic.   Cardiovascular:      Rate and Rhythm: Tachycardia present.         Labs:  No results found for this or any previous visit (from the past 24 hour(s)).    X-Ray was not done.    ASSESSMENT:    No diagnosis found.     Medical Decision Making:    Differential Diagnosis:  dehydration    Serious Comorbid Conditions:  Adult:  reviewed    PLAN:    No doubt, patient is dehydrated and will require IV fluids.  Patient sent to ED for evaluation and treatment.  Patient left in stable condition.      Followup:    If not improving or if condition worsens, follow up with your Primary Care Provider, If not improving or if conditions worsens over the next 12-24 hours, go to the Emergency Department    There are no Patient Instructions on file for this visit.

## 2023-04-13 NOTE — TELEPHONE ENCOUNTER
Pt complains of nausea, diarrhea and feeling tired. She has nausea since Monday. Had dry heaves last night and one episode of vomiting. Today, she had 3-4 episodes of diarrhea. Pt denies dizziness, abdominal pain or severe weakness. She is drinking flavored sparkling water and had a few crackers. Pt has not been taking her diabetes medication. Pt took Meclizine for nausea. Pt questions if she has food poisoning since she cooked some meet with expiration date 04/07 and she cooked it on 04/10/2023. Pt is requesting advice olga what she can take. Triage advised she see UC for evaluation of symptoms. She can be referred to [facility] if pt needs IV fluids. Pt verbalized agreement with plan.     FYI- please advice if provider agrees with plan or has any other recommendations.     Reason for Disposition    Patient wants to be seen    Additional Information    Negative: Shock suspected (e.g., cold/pale/clammy skin, too weak to stand, low BP, rapid pulse)    Negative: Sounds like a life-threatening emergency to the triager    Negative: Nausea or vomiting and pregnancy < 20 weeks    Negative: Menstrual Period - Missed or Late (i.e., pregnancy suspected)    Negative: Heat exhaustion suspected (i.e., dehydration from heat exposure)    Negative: Anxiety or stress suspected (i.e., nausea with anxiety attacks or stressful situations)    Negative: Traumatic Brain Injury (TBI) suspected    Negative: Vomiting occurs    Negative: Other symptom is present, see that guideline.  (e.g., chest pain, headache, dizziness, abdominal pain, colds, sore throat, etc.).    Negative: Unable to walk, or can only walk with assistance (e.g., requires support)    Negative: Difficulty breathing    Negative: Insulin-dependent diabetes (Type I) and glucose > 400 mg/dL (22 mmol/L)    Negative: Drinking very little and dehydration suspected (e.g., no urine > 12 hours, very dry mouth, very lightheaded)    Negative: Patient sounds very sick or weak to the triager     Negative: Fever > 104 F  (40 C)    Negative: Fever > 101 F  (38.3 C) and over 60 years of age    Negative: Fever > 100.0 F  (37.8 C) and bedridden (e.g., nursing home patient, CVA, chronic illness, recovering from surgery)    Negative: Fever > 100.0 F  (37.8 C) and diabetes mellitus or weak immune system (e.g., HIV positive, cancer chemo, splenectomy, chronic steroids)    Negative: Taking any of the following medications: digoxin (Lanoxin), lithium, theophylline, phenytoin (Dilantin)    Negative: Yellowish color of the skin or white of the eye (i.e., jaundice)    Negative: Fever present > 3 days (72 hours)    Protocols used: NAUSEA-A-OH

## 2023-04-13 NOTE — ED TRIAGE NOTES
Patient comi ng in with multiple days of nausea, vomiting and diarrhea. Denies bloody stool and fever.

## 2023-04-19 ENCOUNTER — OFFICE VISIT (OUTPATIENT)
Dept: FAMILY MEDICINE | Facility: CLINIC | Age: 88
End: 2023-04-19
Payer: COMMERCIAL

## 2023-04-19 ENCOUNTER — ANCILLARY PROCEDURE (OUTPATIENT)
Dept: ULTRASOUND IMAGING | Facility: CLINIC | Age: 88
End: 2023-04-19
Attending: PHYSICIAN ASSISTANT
Payer: COMMERCIAL

## 2023-04-19 ENCOUNTER — APPOINTMENT (OUTPATIENT)
Dept: GENERAL RADIOLOGY | Facility: CLINIC | Age: 88
End: 2023-04-19
Attending: EMERGENCY MEDICINE
Payer: COMMERCIAL

## 2023-04-19 ENCOUNTER — HOSPITAL ENCOUNTER (EMERGENCY)
Facility: CLINIC | Age: 88
Discharge: HOME | End: 2023-04-19
Attending: EMERGENCY MEDICINE | Admitting: EMERGENCY MEDICINE
Payer: COMMERCIAL

## 2023-04-19 VITALS
DIASTOLIC BLOOD PRESSURE: 83 MMHG | SYSTOLIC BLOOD PRESSURE: 149 MMHG | WEIGHT: 281.3 LBS | HEART RATE: 76 BPM | OXYGEN SATURATION: 94 % | BODY MASS INDEX: 46.87 KG/M2 | HEIGHT: 65 IN | TEMPERATURE: 97.4 F

## 2023-04-19 VITALS
HEIGHT: 65 IN | SYSTOLIC BLOOD PRESSURE: 155 MMHG | OXYGEN SATURATION: 93 % | DIASTOLIC BLOOD PRESSURE: 85 MMHG | HEART RATE: 89 BPM | TEMPERATURE: 97.4 F | BODY MASS INDEX: 46.82 KG/M2 | RESPIRATION RATE: 20 BRPM | WEIGHT: 281 LBS

## 2023-04-19 DIAGNOSIS — I89.0 LYMPHEDEMA OF LEFT UPPER EXTREMITY: ICD-10-CM

## 2023-04-19 DIAGNOSIS — R22.32 LOCALIZED SWELLING ON LEFT HAND: ICD-10-CM

## 2023-04-19 DIAGNOSIS — M79.10 MYALGIA: ICD-10-CM

## 2023-04-19 DIAGNOSIS — R19.7 DIARRHEA, UNSPECIFIED TYPE: ICD-10-CM

## 2023-04-19 DIAGNOSIS — R79.89 ELEVATED TROPONIN: Primary | ICD-10-CM

## 2023-04-19 DIAGNOSIS — R11.0 NAUSEA: Primary | ICD-10-CM

## 2023-04-19 DIAGNOSIS — M79.89 LEFT UPPER EXTREMITY SWELLING: ICD-10-CM

## 2023-04-19 LAB
ALBUMIN SERPL BCG-MCNC: 3.8 G/DL (ref 3.5–5.2)
ALBUMIN SERPL BCG-MCNC: 3.8 G/DL (ref 3.5–5.2)
ALP SERPL-CCNC: 76 U/L (ref 35–104)
ALP SERPL-CCNC: 77 U/L (ref 35–104)
ALT SERPL W P-5'-P-CCNC: 7 U/L (ref 10–35)
ALT SERPL W P-5'-P-CCNC: 7 U/L (ref 10–35)
ANION GAP SERPL CALCULATED.3IONS-SCNC: 13 MMOL/L (ref 7–15)
ANION GAP SERPL CALCULATED.3IONS-SCNC: 15 MMOL/L (ref 7–15)
AST SERPL W P-5'-P-CCNC: 13 U/L (ref 10–35)
AST SERPL W P-5'-P-CCNC: 14 U/L (ref 10–35)
BASOPHILS # BLD AUTO: 0.1 10E3/UL (ref 0–0.2)
BASOPHILS NFR BLD AUTO: 1 %
BILIRUB SERPL-MCNC: 0.3 MG/DL
BILIRUB SERPL-MCNC: 0.3 MG/DL
BUN SERPL-MCNC: 26 MG/DL (ref 8–23)
BUN SERPL-MCNC: 27.3 MG/DL (ref 8–23)
CALCIUM SERPL-MCNC: 9.6 MG/DL (ref 8.8–10.2)
CALCIUM SERPL-MCNC: 9.9 MG/DL (ref 8.8–10.2)
CHLORIDE SERPL-SCNC: 102 MMOL/L (ref 98–107)
CHLORIDE SERPL-SCNC: 103 MMOL/L (ref 98–107)
CREAT SERPL-MCNC: 1.65 MG/DL (ref 0.51–0.95)
CREAT SERPL-MCNC: 1.78 MG/DL (ref 0.51–0.95)
DEPRECATED HCO3 PLAS-SCNC: 22 MMOL/L (ref 22–29)
DEPRECATED HCO3 PLAS-SCNC: 24 MMOL/L (ref 22–29)
EOSINOPHIL # BLD AUTO: 0.2 10E3/UL (ref 0–0.7)
EOSINOPHIL NFR BLD AUTO: 2 %
ERYTHROCYTE [DISTWIDTH] IN BLOOD BY AUTOMATED COUNT: 15.5 % (ref 10–15)
ERYTHROCYTE [DISTWIDTH] IN BLOOD BY AUTOMATED COUNT: 15.6 % (ref 10–15)
GFR SERPL CREATININE-BSD FRML MDRD: 27 ML/MIN/1.73M2
GFR SERPL CREATININE-BSD FRML MDRD: 29 ML/MIN/1.73M2
GLUCOSE SERPL-MCNC: 117 MG/DL (ref 70–99)
GLUCOSE SERPL-MCNC: 135 MG/DL (ref 70–99)
HCT VFR BLD AUTO: 42 % (ref 35–47)
HCT VFR BLD AUTO: 42.4 % (ref 35–47)
HGB BLD-MCNC: 12.8 G/DL (ref 11.7–15.7)
HGB BLD-MCNC: 13.2 G/DL (ref 11.7–15.7)
HOLD SPECIMEN: NORMAL
IMM GRANULOCYTES # BLD: 0.1 10E3/UL
IMM GRANULOCYTES NFR BLD: 1 %
LYMPHOCYTES # BLD AUTO: 1.5 10E3/UL (ref 0.8–5.3)
LYMPHOCYTES NFR BLD AUTO: 15 %
MCH RBC QN AUTO: 24.8 PG (ref 26.5–33)
MCH RBC QN AUTO: 25.7 PG (ref 26.5–33)
MCHC RBC AUTO-ENTMCNC: 30.2 G/DL (ref 31.5–36.5)
MCHC RBC AUTO-ENTMCNC: 31.4 G/DL (ref 31.5–36.5)
MCV RBC AUTO: 82 FL (ref 78–100)
MCV RBC AUTO: 82 FL (ref 78–100)
MONOCYTES # BLD AUTO: 0.9 10E3/UL (ref 0–1.3)
MONOCYTES NFR BLD AUTO: 9 %
NEUTROPHILS # BLD AUTO: 7.3 10E3/UL (ref 1.6–8.3)
NEUTROPHILS NFR BLD AUTO: 72 %
NRBC # BLD AUTO: 0 10E3/UL
NRBC BLD AUTO-RTO: 0 /100
NT-PROBNP SERPL-MCNC: 1163 PG/ML (ref 0–1800)
PLATELET # BLD AUTO: 363 10E3/UL (ref 150–450)
PLATELET # BLD AUTO: 391 10E3/UL (ref 150–450)
POTASSIUM SERPL-SCNC: 4.9 MMOL/L (ref 3.4–5.3)
POTASSIUM SERPL-SCNC: 5.1 MMOL/L (ref 3.4–5.3)
PROT SERPL-MCNC: 7.5 G/DL (ref 6.4–8.3)
PROT SERPL-MCNC: 7.7 G/DL (ref 6.4–8.3)
RBC # BLD AUTO: 5.14 10E6/UL (ref 3.8–5.2)
RBC # BLD AUTO: 5.17 10E6/UL (ref 3.8–5.2)
SARS-COV-2 RNA RESP QL NAA+PROBE: NEGATIVE
SODIUM SERPL-SCNC: 139 MMOL/L (ref 136–145)
SODIUM SERPL-SCNC: 140 MMOL/L (ref 136–145)
TROPONIN T SERPL HS-MCNC: 32 NG/L
TROPONIN T SERPL HS-MCNC: 32 NG/L
TROPONIN T SERPL HS-MCNC: 33 NG/L
WBC # BLD AUTO: 10.6 10E3/UL (ref 4–11)
WBC # BLD AUTO: 9.9 10E3/UL (ref 4–11)

## 2023-04-19 PROCEDURE — U0005 INFEC AGEN DETEC AMPLI PROBE: HCPCS | Performed by: PHYSICIAN ASSISTANT

## 2023-04-19 PROCEDURE — U0003 INFECTIOUS AGENT DETECTION BY NUCLEIC ACID (DNA OR RNA); SEVERE ACUTE RESPIRATORY SYNDROME CORONAVIRUS 2 (SARS-COV-2) (CORONAVIRUS DISEASE [COVID-19]), AMPLIFIED PROBE TECHNIQUE, MAKING USE OF HIGH THROUGHPUT TECHNOLOGIES AS DESCRIBED BY CMS-2020-01-R: HCPCS | Performed by: PHYSICIAN ASSISTANT

## 2023-04-19 PROCEDURE — 80053 COMPREHEN METABOLIC PANEL: CPT | Performed by: PHYSICIAN ASSISTANT

## 2023-04-19 PROCEDURE — 99285 EMERGENCY DEPT VISIT HI MDM: CPT | Mod: 25,CS

## 2023-04-19 PROCEDURE — 71046 X-RAY EXAM CHEST 2 VIEWS: CPT

## 2023-04-19 PROCEDURE — 93971 EXTREMITY STUDY: CPT | Mod: LT

## 2023-04-19 PROCEDURE — 85025 COMPLETE CBC W/AUTO DIFF WBC: CPT | Performed by: PHYSICIAN ASSISTANT

## 2023-04-19 PROCEDURE — 36415 COLL VENOUS BLD VENIPUNCTURE: CPT | Performed by: PHYSICIAN ASSISTANT

## 2023-04-19 PROCEDURE — 83880 ASSAY OF NATRIURETIC PEPTIDE: CPT | Performed by: EMERGENCY MEDICINE

## 2023-04-19 PROCEDURE — 84484 ASSAY OF TROPONIN QUANT: CPT | Performed by: EMERGENCY MEDICINE

## 2023-04-19 PROCEDURE — 84155 ASSAY OF PROTEIN SERUM: CPT | Performed by: EMERGENCY MEDICINE

## 2023-04-19 PROCEDURE — 96127 BRIEF EMOTIONAL/BEHAV ASSMT: CPT | Performed by: PHYSICIAN ASSISTANT

## 2023-04-19 PROCEDURE — 93005 ELECTROCARDIOGRAM TRACING: CPT

## 2023-04-19 PROCEDURE — 85027 COMPLETE CBC AUTOMATED: CPT | Performed by: EMERGENCY MEDICINE

## 2023-04-19 PROCEDURE — 84484 ASSAY OF TROPONIN QUANT: CPT | Performed by: PHYSICIAN ASSISTANT

## 2023-04-19 PROCEDURE — 93000 ELECTROCARDIOGRAM COMPLETE: CPT | Performed by: PHYSICIAN ASSISTANT

## 2023-04-19 PROCEDURE — 99214 OFFICE O/P EST MOD 30 MIN: CPT | Mod: CS | Performed by: PHYSICIAN ASSISTANT

## 2023-04-19 PROCEDURE — 36415 COLL VENOUS BLD VENIPUNCTURE: CPT | Performed by: EMERGENCY MEDICINE

## 2023-04-19 ASSESSMENT — ENCOUNTER SYMPTOMS
CONSTIPATION: 0
NUMBNESS: 0
COUGH: 0
HEADACHES: 0
CHILLS: 0
ABDOMINAL PAIN: 0
DIARRHEA: 0
PHOTOPHOBIA: 0
SHORTNESS OF BREATH: 0
DIZZINESS: 0
NECK STIFFNESS: 1
NAUSEA: 0
FEVER: 0
LIGHT-HEADEDNESS: 1
DIFFICULTY URINATING: 0

## 2023-04-19 ASSESSMENT — ASTHMA QUESTIONNAIRES
ACT_TOTALSCORE: 19
QUESTION_1 LAST FOUR WEEKS HOW MUCH OF THE TIME DID YOUR ASTHMA KEEP YOU FROM GETTING AS MUCH DONE AT WORK, SCHOOL OR AT HOME: NONE OF THE TIME
QUESTION_4 LAST FOUR WEEKS HOW OFTEN HAVE YOU USED YOUR RESCUE INHALER OR NEBULIZER MEDICATION (SUCH AS ALBUTEROL): TWO OR THREE TIMES PER WEEK
ACT_TOTALSCORE: 19
QUESTION_3 LAST FOUR WEEKS HOW OFTEN DID YOUR ASTHMA SYMPTOMS (WHEEZING, COUGHING, SHORTNESS OF BREATH, CHEST TIGHTNESS OR PAIN) WAKE YOU UP AT NIGHT OR EARLIER THAN USUAL IN THE MORNING: NOT AT ALL
QUESTION_2 LAST FOUR WEEKS HOW OFTEN HAVE YOU HAD SHORTNESS OF BREATH: ONCE A DAY
QUESTION_5 LAST FOUR WEEKS HOW WOULD YOU RATE YOUR ASTHMA CONTROL: WELL CONTROLLED

## 2023-04-19 ASSESSMENT — PATIENT HEALTH QUESTIONNAIRE - PHQ9
10. IF YOU CHECKED OFF ANY PROBLEMS, HOW DIFFICULT HAVE THESE PROBLEMS MADE IT FOR YOU TO DO YOUR WORK, TAKE CARE OF THINGS AT HOME, OR GET ALONG WITH OTHER PEOPLE: SOMEWHAT DIFFICULT
SUM OF ALL RESPONSES TO PHQ QUESTIONS 1-9: 11
SUM OF ALL RESPONSES TO PHQ QUESTIONS 1-9: 11

## 2023-04-19 ASSESSMENT — PAIN SCALES - GENERAL: PAINLEVEL: NO PAIN (0)

## 2023-04-19 NOTE — RESULT ENCOUNTER NOTE
Just an FYI-    I called patient with results. Troponin was positive, patient referred to the ED for further eval.  Also, discussed BMP with worsening kidney function.

## 2023-04-19 NOTE — ED NOTES
Rapid Assessment Note    History:   Moira Andre is a 89 year old female, von Plavix, who presents after being sent from clinic with an elevated troponin result. The patient was seen in clinic today for evaluation of 10 days of nausea, neck stiffness, and left hand pain and had an ultrasound done that came back negative for DVT. They believe the swelling in the hand was due to an IV that she had placed 6 days ago. At that time, she also had blood work done which showed an elevated troponin and was told to present to the ED for evaluation. Presently, the patient's nausea is resolved and she denies any fever, chills, abdominal pain, constipation, diarrhea, or difficulty urinating. She also denies any chest pain, new shortness of breath, headache, dizziness, vision changes, numbness, or tingling but does endorses having some lightheadedness with ambulation and increased leg swelling. She is not on any diuretics but does take Plavix daily and has a history of 2 stents placed.    Exam:   Constitutional:       Appearance: Normal appearance.   HENT:      Head: Normocephalic and atraumatic.   Eyes:      Extraocular Movements: Extraocular movements intact.      Conjunctiva/sclera: Conjunctivae normal.   Cardiovascular:      Rate and Rhythm: Normal rate and regular rhythm.   Pulmonary:      Effort: Pulmonary effort is normal.  No respiratory distress.      Breath sounds: Clear to auscultation bilateral.  Abdominal:      General: Abdomen is flat. There is no distension.      Palpations: Abdomen is soft.      Tenderness: There is no abdominal tenderness.   Musculoskeletal:      Cervical back: Normal range of motion. No rigidity.      Left upper extremity: Left upper extremity swelling with no associated erythema.  2+ left radial pulse.  Normal cap refill.  Normal sensation.  Able to complete okay sign, peace sign, finger, and hold fist.  Able to extend and flex wrist.      Right lower leg: No edema.      Left lower leg: No  edema.   Skin:     General: Skin is warm and dry.   Neurological:      General: No focal deficit present.      Mental Status: Alert and oriented to person, place, and time.   Psychiatric:         Mood and Affect: Mood normal.         Behavior: Behavior normal.    Plan of Care:   I evaluated the patient and developed an initial plan of care. I discussed this plan and explained that I, or one of my partners, would be returning to complete the evaluation.     I, Alison Moser, am serving as a scribe to document services personally performed by Vasiliy Reyes DO, based on my observations and the provider's statements to me.    4/19/2023  [employer] [employer]    Portions of this medical record were completed by a scribe. UPON MY REVIEW AND AUTHENTICATION BY ELECTRONIC SIGNATURE, this confirms (a) I performed the applicable clinical services, and (b) the record is accurate.        Vasiliy Reyes DO  04/19/23 1953

## 2023-04-19 NOTE — PROGRESS NOTES
Assessment and Plan:     (R11.0) Nausea  (primary encounter diagnosis)  Comment: sick x 10 days w/predominantly nausea and fatigue, was seen in urgent care on 4/13/2023 and was tachycardic to the 130s, was referred to the ED at that time but then went home due to the ED wait time, continues to have diarrhea though is feeling a bit better today, has eaten minimal at home, denies chest pain or shortness of breath, has had some neck and back pain which she has had in the past, is pale and a little diaphoretic today, she does not check her blood sugars  Plan: EKG 12-lead complete w/read - Clinics, CBC with        platelets, Troponin T, High Sensitivity,         Comprehensive metabolic panel (BMP + Alb, Alk         Phos, ALT, AST, Total. Bili, TP), CANCELED:         Basic metabolic panel  (Ca, Cl, CO2, Creat,         Gluc, K, Na, BUN)  She really would like to avoid going to the ED, will get above work up, bland diet, push fluids  Discussed when to be seen promptly    (M79.89) Left upper extremity swelling  Comment: Onset a few days ago, no trauma, diffusely swollen mostly in the hand a little into the forearm  Plan: US Upper Extremity Venous Duplex Left    (R19.7) Diarrhea, unspecified type  Comment: A bit better today, adhering to a bland diet  Plan: Symptomatic COVID-19 Virus (Coronavirus) by PCR      Colleen Jeff PA-C  30 minutes on the day of the encounter doing chart review, history and exam, documentation and further activities as noted above.        Guanako Simons is a 89 year old, presenting for the following health issues:  Dizziness and Abdominal Pain    History of Present Illness       Reason for visit:  Dizzy,stomachache  Symptom onset:  3-7 days ago  Symptoms include:  Sromach pain,  Symptom intensity:  Moderate  Symptom progression:  Staying the same  Had these symptoms before:  No  What makes it worse:  N/A  What makes it better:  N/A     Today's PHQ-9         PHQ-9 Total Score: 11    PHQ-9  "Q9 Thoughts of better off dead/self-harm past 2 weeks :   Not at all    How difficult have these problems made it for you to do your work, take care of things at home, or get along with other people: Somewhat difficult     Kristyn is here   She has been feeling sick since Easter Sunday, 10 days ago  She has had nausea, diarrhea  She has had decreased appetite and hasn't been eating much since  A week ago she woke up with stiff neck and shoulders  She has had general fatigue  She feels queasy again today  She was able to eat a bit last night  She has also been able to keep down some dry toast and boost  She actually feels \"much better\"  She has not been checking her blood sugars  She has also noticed some swelling in bilateral legs and left hand  She denies chest pain or sob  She denies fever/chills, abdominal, dysuria, frequency or urgency  She has noticed that she has been sweaty   She hasn't had any bloody/black stool  She denies recent travel or abx use    She denies congestion, cough, sore throat    Review of Systems   See above      Objective      BP (!) 149/83 (BP Location: Left arm, Patient Position: Sitting, Cuff Size: Adult Regular)   Pulse 76   Temp 97.4  F (36.3  C) (Tympanic)   Ht 1.651 m (5' 5\")   Wt 127.6 kg (281 lb 4.8 oz)   SpO2 94%   BMI 46.81 kg/m        Physical Exam     GENERAL: pale, diaphoretic  ENT: tacky mm, op clear, no swelling, neck is supple, no meningismus  RESP: lungs clear to auscultation - no rales, no rhonchi, no wheezes  CV: regular rates and rhythm, systolic murmur noted  ABD: large reducible ventral hernia, non-tender, +BS, no other masses   MS: extremities- no gross deformities noted, mild-moderate non-pitting edema bilatlower ext  LUE: diffuse edema of hand, forearm, non-tender, CR<2 s, distal pulses present   SKIN: no suspicious lesions, no rashes          Systolic murmur   Pale diaphoretic  Selling left hand  Patient answers are not available for this visit.  "

## 2023-04-19 NOTE — ED TRIAGE NOTES
Sent to ED for elevated Troponin in clinic   Triage Assessment     Row Name 04/19/23 1573       Triage Assessment (Adult)    Airway WDL WDL       Respiratory WDL    Respiratory WDL WDL       Skin Circulation/Temperature WDL    Skin Circulation/Temperature WDL X       Cardiac WDL    Cardiac WDL WDL       Peripheral/Neurovascular WDL    Peripheral Neurovascular WDL WDL       Cognitive/Neuro/Behavioral WDL    Cognitive/Neuro/Behavioral WDL WDL

## 2023-04-20 LAB
ATRIAL RATE - MUSE: 88 BPM
DIASTOLIC BLOOD PRESSURE - MUSE: NORMAL MMHG
INTERPRETATION ECG - MUSE: NORMAL
P AXIS - MUSE: 23 DEGREES
PR INTERVAL - MUSE: 282 MS
QRS DURATION - MUSE: 94 MS
QT - MUSE: 366 MS
QTC - MUSE: 442 MS
R AXIS - MUSE: -27 DEGREES
SYSTOLIC BLOOD PRESSURE - MUSE: NORMAL MMHG
T AXIS - MUSE: 70 DEGREES
VENTRICULAR RATE- MUSE: 88 BPM

## 2023-04-20 ASSESSMENT — ENCOUNTER SYMPTOMS
HEMATURIA: 0
RHINORRHEA: 0
COLOR CHANGE: 0
AGITATION: 0
MYALGIAS: 1

## 2023-04-20 NOTE — ED PROVIDER NOTES
History     Chief Complaint:  Abnormal Labs     The history is provided by the patient.      Moira Andre is a 89 year old female, on Plavix, with a history of type 2 diabetes mellitus, CHF, hypertension, hyperlipidemia, CAD, MI, TIA, and morbid obesity who presents after being sent from clinic with an elevated troponin result and left upper extremity swelling. The patient reports that she was seen in the ED 6 days ago, having an IV placed in her left arm at that time. 3 days later, she noticed some left arm swelling which initially worsened, but improved today. However, this morning she woke up with pain to the bilateral base of her neck and was feeling generally unwell for the past few days and decided to present to the urgent care for evaluation. There, she had an ultrasound of the left upper extremity done (see below), which came back negative for DVT/abscess/cellulitis in the left arm. However, she also had labs drawn, which showed an elevated troponin and creatinine levels, and she was told to present to the ED for further evaluation.     Presently, the patient is feeling improved with no significant pain to the left hand. She denies any numbness, tingling, headaches, vision changes, middle neck/spine pain, photophobia, dizziness, chest pain, or new shortness of breath, but does note having some lightheadedness with ambulation.     She also denies any fever, chills, cough, or sputum production and has had no nausea, abdominal pain, constipation, diarrhea, or difficulty urinating.     Of note, the patient has a cat, but denies any trauma to the left hand including cat bites or scratches. No bug bites or other trauma to the left hand. She is not on any diuretics but does take Plavix daily and has a history of 2 stents placed.     US Upper Extremity Venous Duplex Left - 4/19/23  No deep venous thrombosis in the left upper extremity.  No fluid collections or other sonographic abnormalities in the area swelling  in the distal left upper extremity.    Independent Historian:   None - Patient Only    Review of External Notes: 4/19/23 FP office visit note, labwork, and imaging.     ROS:  Review of Systems   Constitutional: Negative for chills and fever.   HENT: Negative for congestion and rhinorrhea.         (-) sputum production   Eyes: Negative for photophobia and visual disturbance.   Respiratory: Negative for cough and shortness of breath.    Cardiovascular: Negative for chest pain and leg swelling.   Gastrointestinal: Negative for abdominal pain, constipation, diarrhea and nausea.   Genitourinary: Negative for difficulty urinating and hematuria.   Musculoskeletal: Positive for myalgias and neck stiffness.        (+) left hand swelling   Skin: Negative for color change and pallor.   Neurological: Positive for light-headedness (with ambulation). Negative for dizziness, numbness (or tingling) and headaches.   Psychiatric/Behavioral: Negative for agitation and behavioral problems.   All other systems reviewed and are negative.    Allergies:  Aspirin  Lisinopril  Losartan  Metformin  Minocycline  Salicylates  Yellow Dyes     Medications:    Albuterol inhaler  Lipitor  Wellbutrin  Plavix  Cymbalta  Allegra  Wixela Inhub inhaler  Apresoline  Bonvia  Toprol  Nitrostat  Protonix  Mounjaro pen    Past Medical History:    Aortic valve stenosis  Asthma  CKD stage 3  CHF  Depression  Type 2 diabetes mellitus  Hypertension  Diastolic dysfunction, left ventricle  Lactic acidosis  Migraines  Mitral stenosis  MI  Nephrolithiasis   OA  Morbid obesity  RA  ELIGIO on CPAP  TIA  Ventral hernia  Hyperlipidemia   Microalbuminuria   Anemia due to blood loss  GERD  Bilateral edema of lower extremity  Anxiety  Other chronic pain  CAD  Lumbar radiculopathy  Obesity hypoventilation syndrome  Spinal stenosis of lumbosacral region      Past Surgical History:    Appendectomy  Breast biopsy x2  Cholecystectomy  Coronary angiography x2  Left heart  "cath  JURGEN  Knee arthroplasty x2, right and left  Carpal tunnel release, bilateral  Right femoral artery pseudoaneurysm repair     Family History:    CAD  Neurologic disorder    Social History:  The patient presents to the ED with 2 family members.  The patient arrived to the ED in a private vehicle.   PCP: Maryan Churchill     Physical Exam     Patient Vitals for the past 24 hrs:   BP Temp Temp src Pulse Resp SpO2 Height Weight   04/19/23 2109 (!) 155/85 -- -- 89 20 93 % -- --   04/19/23 1855 (!) 166/93 97.4  F (36.3  C) Temporal 89 18 98 % 1.651 m (5' 5\") 127.5 kg (281 lb)      Physical Exam    Constitutional:       Appearance: Normal appearance.   HENT:      Head: Normocephalic and atraumatic.   Eyes:      Extraocular Movements: Extraocular movements intact.      Conjunctiva/sclera: Conjunctivae normal.   Cardiovascular:      Rate and Rhythm: Normal rate and regular rhythm.   Pulmonary:      Effort: Pulmonary effort is normal.  No respiratory distress.      Breath sounds: Clear to auscultation bilateral.  Abdominal:      General: Abdomen is flat. There is no distension.      Palpations: Abdomen is soft.      Tenderness: There is no abdominal tenderness.   Musculoskeletal:      Cervical back: Normal range of motion. No rigidity.  Negative Kernig's and Brudzinski.  No midline cervical spine tenderness to palpation.     Left upper extremity: Left upper extremity swelling with no associated erythema.  2+ left radial pulse.  Normal cap refill.  Normal sensation.  Able to complete okay sign, peace sign, finger cross with index and middle finger, finger spread, thumbs up, and hold fist.  Able to extend and flex wrist.  Compartments of left upper extremity and left hand soft to compression.  No significant tenderness to palpation.  No temperature changes.  No obvious wounds.      Right lower leg: No edema.      Left lower leg: No edema.   Skin:     General: Skin is warm and dry.   Neurological:      General: No focal " deficit present.      Mental Status: Alert and oriented to person, place, and time.   Psychiatric:         Mood and Affect: Mood normal.         Behavior: Behavior normal.            Emergency Department Course   ECG:  EKG 12-lead, tracing only  Normal sinus rhythm.  Rate of 88.  First-degree AV block.  Normal QRS and QTc.  No acute ST elevation or depression as compared with 2/16/2022 EKG.    Imaging:  XR Chest 2 Views   Final Result   IMPRESSION:      Patchy somewhat nodular airspace opacities at the right lateral lung base could represent infection in the appropriate clinical scenario. Recommend follow-up imaging in 3 months to ensure resolution.      Lungs are otherwise clear. No pleural effusions or pneumothorax.      Nonenlarged cardiac silhouette. Normal pulmonary vascularity.      Multilevel degenerative changes of the spine. No acute bony abnormality.         Report per radiology    Laboratory:  Labs Ordered and Resulted from Time of ED Arrival to Time of ED Departure   COMPREHENSIVE METABOLIC PANEL - Abnormal       Result Value    Sodium 140      Potassium 4.9      Chloride 103      Carbon Dioxide (CO2) 24      Anion Gap 13      Urea Nitrogen 26.0 (*)     Creatinine 1.65 (*)     Calcium 9.6      Glucose 135 (*)     Alkaline Phosphatase 76      AST 14      ALT 7 (*)     Protein Total 7.5      Albumin 3.8      Bilirubin Total 0.3      GFR Estimate 29 (*)    TROPONIN T, HIGH SENSITIVITY - Abnormal    Troponin T, High Sensitivity 32 (*)    CBC WITH PLATELETS AND DIFFERENTIAL - Abnormal    WBC Count 9.9      RBC Count 5.17      Hemoglobin 12.8      Hematocrit 42.4      MCV 82      MCH 24.8 (*)     MCHC 30.2 (*)     RDW 15.6 (*)     Platelet Count 391      % Neutrophils 72      % Lymphocytes 15      % Monocytes 9      % Eosinophils 2      % Basophils 1      % Immature Granulocytes 1      NRBCs per 100 WBC 0      Absolute Neutrophils 7.3      Absolute Lymphocytes 1.5      Absolute Monocytes 0.9      Absolute  Eosinophils 0.2      Absolute Basophils 0.1      Absolute Immature Granulocytes 0.1      Absolute NRBCs 0.0     TROPONIN T, HIGH SENSITIVITY - Abnormal    Troponin T, High Sensitivity 33 (*)    NT PROBNP INPATIENT - Normal    N terminal Pro BNP Inpatient 1,163       Emergency Department Course & Assessments:  Independent Interpretation (X-rays, CTs, rhythm strip):  2114: On my independent interpretation of chest x-ray, there is mild patchy right lower lung opacity, no mediastinal widening, and no pneumothorax.    Assessments/Consultations/Discussion of Management or Tests:  ED Course as of 04/20/23 0043   Wed Apr 19, 2023   1903 Creatinine(!): 1.65  Near baseline. Improved from outpatient.   2002 Troponin T, High Sensitivity(!): 32  Same as compared with outpatient trop, likely 2/2 CKD.   2114 XR Chest 2 Views  On my independent interpretation of chest x-ray, there is mild patchy right lower lung opacity, no mediastinal widening, and no pneumothorax.   2115 EKG 12-lead, tracing only  Normal sinus rhythm.  Rate of 88.  First-degree AV block.  Normal QRS and QTc.  No acute ST elevation or depression as compared with 2/16/2022 EKG.   2221 Troponin T, High Sensitivity(!): 33  flat   2245 I updated patient on lab and image findings.  Patient wanting to go home.  Patient denies any chest pain or shortness of breath.  Patient states that she is feeling much better as compared to when she was evaluated outpatient today.  I updated patient on chest x-ray finding of mild right-sided opacity for which patient denies any respiratory symptoms, cough, or sputum production.  Patient would prefer to decline antibiotic initiation at this time and follow-up with her PCP for repeat chest x-ray as needed or if she becomes symptomatic.  Patient states that she will also follow-up with her primary care provider regarding her left hand.  Advised patient to keep limb elevated.  Discussed strict return precautions.  Answered all questions.   Patient voiced understanding and agreement with plan.  Patient ambulating with walker without difficulty.     Social Determinants of Health affecting care:   None    Disposition:  The patient was discharged to home.     Impression & Plan      Medical Decision Makin-year-old female as described above presents to the emergency department sent in by primary care provider given elevated troponin and worsening creatinine function.  Patient originally presented to outpatient clinic today for nausea and fatigue and generalized unwellness, but that has since resolved since arrival to the ER.  Patient also complaining of waking up this morning with some myalgias which she endorses as base of neck ache and upper trap/neck stiffness.  Patient also was evaluated for concern for left upper extremity swelling for which she states is now improving since yesterday.  Patient hemodynamically stable at time evaluation.  Afebrile.  In regards to patient's neck ache/myalgia/generalized unwellness, patient received negative outpatient COVID swabbing.  No headache, vision changes, photophobia, fever, or meningeal findings to suggest viral/bacterial meningitis.  Additionally, patient has negative Kernig's and Brudzinski and otherwise well-appearing. No emergent/urgent indication for lumbar puncture at this time, especially since patient is on Plavix.  Unlikely SAH given no history of sudden onset headache or head injury.  In regards to patient's left upper extremity swelling.  Patient states that she recently had IV placement and left forearm.  On examination, patient does have bruising over the left distal forearm, given negative outpatient ultrasound duplex for evaluation for DVT, thrombophlebitis, or abscess/cellulitis, swelling likely secondary to lymphedema from lymph vessel injury versus IV infiltration.  Left hand neurovascularly intact.  Will advise patient to continue monitoring and keep limb elevated.  No skin color  changes, tenderness, or wounds to suggest extremity cellulitis.  In regards to patient's incidental finding of elevated troponin, given history of CKD, likely chronic elevation in troponin.  We will trend troponin in the ER.  Low suspicion for ACS given lack of chest pain or shortness of breath.  No STEMI on EKG.  Evaluate for heart failure.  If work-up negative, will discharge patient to follow-up with PCP.  Discussed care plan with patient and family who voiced understanding and agreement with plan.  Answered all questions.  Additional work-up and orders as listed in chart.    Please refer to ED course above for details on the patient's treatment course and any changes or updates in care plan beyond my initial evaluation and MDM.    Due to hospital and departmental capacity constraints and prolonged wait times, this patient was evaluated in non-traditional circumstances such as in triage/waiting room, a hallway, etc. I explained the option to wait for a traditional treatment space and apologized for the inconvenience. Given the circumstances, every attempt was made to provide for the patient's comfort and privacy and to perform the most thorough evaluation possible.    Diagnosis:    ICD-10-CM    1. Elevated troponin  R77.8       2. Localized swelling on left hand  R22.32       3. Lymphedema of left upper extremity  I89.0       4. Myalgia  M79.10          Scribe Disclosure:  I, Alison Moser, am serving as a scribe at 8:14 PM on 4/19/2023 to document services personally performed by Vasiliy Reyes DO based on my observations and the provider's statements to me.      4/19/2023   Vasiliy Reyes DO Yeh, Ferr[place], DO  04/20/23 0043

## 2023-05-02 DIAGNOSIS — E11.9 TYPE 2 DIABETES MELLITUS WITHOUT COMPLICATION, WITHOUT LONG-TERM CURRENT USE OF INSULIN (H): ICD-10-CM

## 2023-05-02 RX ORDER — TIRZEPATIDE 5 MG/.5ML
INJECTION, SOLUTION SUBCUTANEOUS
Qty: 2 ML | Refills: 11 | OUTPATIENT
Start: 2023-05-02

## 2023-05-02 NOTE — TELEPHONE ENCOUNTER
Patient has MTM appt on 5/4, will discuss refills at that time.  She should have enough to get her to that visit.  Liberty Morin, BeaD, Psychiatric  Medication Therapy Management Provider  Pager: [phone]

## 2023-05-04 ENCOUNTER — TELEPHONE (OUTPATIENT)
Dept: FAMILY MEDICINE | Facility: CLINIC | Age: 88
End: 2023-05-04

## 2023-05-04 NOTE — TELEPHONE ENCOUNTER
TO Liberty,     Patient calling requesting call back. Patient states she had an appointment and states she wants to make sure that was you calling for your  4 pm appointment.    Patient states she is by the phone now.       Jossy Hathaway RN on 5/4/2023 at 3:49 PM

## 2023-05-04 NOTE — TELEPHONE ENCOUNTER
Patient's appt was at 3pm, not 4pm.  I called twice and there was no answer.  She'll need to re-schedule at this point - routing to team coordinators to re-schedule patient.    Bea YoungD, [facility]  Medication Therapy Management Provider  Pager: [phone]

## 2023-05-05 ENCOUNTER — TELEPHONE (OUTPATIENT)
Dept: FAMILY MEDICINE | Facility: CLINIC | Age: 88
End: 2023-05-05
Payer: COMMERCIAL

## 2023-05-05 NOTE — TELEPHONE ENCOUNTER
"To PCP    Patient calling stating, \"I've been talking with Liberty from pharmacy and I'm on Mounjaro and last week I had two days of severe diarrhea and this time it's better but I am wondering what I should do.\"    Writer advised diarrhea is a side affect of the medication and will dissipate within a month or two. Because it is getting better, take again on Sunday. If side affects get worse again after the Sunday injection, please call back to be further triaged.     Please advise if further instruction needed.     Jossy Hathaway RN on 5/5/2023 at 10:42 AM    "

## 2023-05-05 NOTE — TELEPHONE ENCOUNTER
Writer called patient and reviewed recommendation from PCP and MTM. Patient expressed verbal understanding and is agreeable, will callback if side effects persist or worsen.    Patient is appreciative of callback.    Signing encounter.    Mora Yañez RN  [facility]

## 2023-05-11 ENCOUNTER — TELEPHONE (OUTPATIENT)
Dept: FAMILY MEDICINE | Facility: CLINIC | Age: 88
End: 2023-05-11
Payer: COMMERCIAL

## 2023-05-11 DIAGNOSIS — E11.21 TYPE 2 DIABETES MELLITUS WITH DIABETIC NEPHROPATHY, WITHOUT LONG-TERM CURRENT USE OF INSULIN (H): Primary | ICD-10-CM

## 2023-05-11 RX ORDER — TIRZEPATIDE 7.5 MG/.5ML
7.5 INJECTION, SOLUTION SUBCUTANEOUS
Qty: 2 ML | Refills: 0 | Status: SHIPPED | OUTPATIENT
Start: 2023-05-11 | End: 2023-05-16 | Stop reason: SINTOL

## 2023-05-11 NOTE — TELEPHONE ENCOUNTER
General Call    Contacts       Type Contact Phone/Fax    05/11/2023 01:28 PM CDT Phone (Incoming) Kristyn Andre (Self) [phone] (H)        Reason for Call:  Pt would like a call back    What are your questions or concerns:  Pt states she forgot to address somsething    Date of last appointment with provider: NA    Okay to leave a detailed message?: Yes at Cell number on file:    Telephone Information:   Mobile [phone]

## 2023-05-11 NOTE — TELEPHONE ENCOUNTER
Returned call to patient.  She reports her blood sugar has been running high - sometimes up to 160-180mg/dL. She reports she's tolerating Mounjaro well.  She reports diarrhea and nausea has resolved and she does not feel this was medication related.  Recommended increasing Mounjaro to 7.5mg dose, MTM follow-up already scheduled for 5/30.  Updated Rx sent to pharmacy.    Bea YoungD, [facility]  Medication Therapy Management Provider  Pager: [phone]

## 2023-05-16 ENCOUNTER — NURSE TRIAGE (OUTPATIENT)
Dept: FAMILY MEDICINE | Facility: CLINIC | Age: 88
End: 2023-05-16
Payer: COMMERCIAL

## 2023-05-16 RX ORDER — GLIMEPIRIDE 2 MG/1
2 TABLET ORAL
COMMUNITY
End: 2023-07-10

## 2023-05-16 NOTE — TELEPHONE ENCOUNTER
Thanks - yes, let's have her resume glimepiride 2mg daily as she was previously taking.  I've updated her medication list to reflect this.    Thanks!  Liberty Morin, PharmD, [facility]  Medication Therapy Management Provider  Pager: [phone]

## 2023-05-16 NOTE — TELEPHONE ENCOUNTER
"CC: Patient calling reporting diarrhea     DIARRHEA SEVERITY: 2x today   ONSET: took weekly Mounjaro injection on Sunday night diarrhea started on Monday night - intermittent since starting Mounjaro   BM CONSISTENCY: watery and loose   VOMITING: denies   ABDOMINAL PAIN: denies   ABDOMINAL PAIN SEVERITY: denies abdominal pain - reports feeling gas and \"rumbling\"  ORAL INTAKE: yes, drinking plenty of fluids - eating bland foods   HYDRATION: drinking water  EXPOSURE: denies any known exposures   ANTIBIOTIC USE: denies   OTHER SYMPTOMS: denies fever or blood in stool     Triaged per Epic protocol, patient to be seen in office within 3 days. Patient is requesting for a message to be sent to both PCP and Liberty, states she would like to stop Mounjaro as she feels she cannot tolerate side effects.    Please advise if patient should schedule appointment to further discuss? Please review and advise - thank you!     Callback [phone] - ok to leave detailed VM     Mora Yañez RN  [facility]    Reason for Disposition    MILD diarrhea (e.g., 1-3 or more stools than normal in past 24 hours) diarrhea without known cause and present > 7 days    Additional Information    Negative: Shock suspected (e.g., cold/pale/clammy skin, too weak to stand, low BP, rapid pulse)    Negative: Difficult to awaken or acting confused (e.g., disoriented, slurred speech)    Negative: Sounds like a life-threatening emergency to the triager    Negative: Vomiting also present and worse than the diarrhea    Negative: Blood in stool and without diarrhea    Negative: SEVERE abdominal pain (e.g., excruciating) and present > 1 hour    Negative: SEVERE abdominal pain and age > 60 years    Negative: Bloody, black, or tarry bowel movements (Exception: chronic-unchanged black-grey bowel movements and is taking iron pills or Pepto-Bismol)    Negative: SEVERE diarrhea (e.g., 7 or more times / day more than normal) and age > 60 years    " Negative: Constant abdominal pain lasting > 2 hours    Negative: Drinking very little and has signs of dehydration (e.g., no urine > 12 hours, very dry mouth, very lightheaded)    Negative: Patient sounds very sick or weak to the triager    Negative: SEVERE diarrhea (e.g., 7 or more times / day more than normal) and present > 24 hours (1 day)    Negative: MODERATE diarrhea (e.g., 4-6 times / day more than normal) and present > 48 hours (2 days)    Negative: MODERATE diarrhea (e.g., 4-6 times / day more than normal) and age > 70 years    Negative: Abdominal pain (Exception: Pain clears completely with each passage of diarrhea stool.)    Negative: Fever > 101 F (38.3 C)    Negative: Blood in the stool    Negative: Mucus or pus in stool has been present > 2 days and diarrhea is more than mild    Negative: Weak immune system (e.g., HIV positive, cancer chemo, splenectomy, organ transplant, chronic steroids)    Negative: Travel to a foreign country in past month    Negative: Recent antibiotic therapy (i.e., within last 2 months) and diarrhea present > 3 days since antibiotic was stopped    Negative: Recent hospitalization and diarrhea present > 3 days    Negative: Tube feedings (e.g., nasogastric, g-tube, j-tube)    Protocols used: DIARRHEA-A-OH

## 2023-05-16 NOTE — TELEPHONE ENCOUNTER
Spoke with patient and reviewed Liberty's message below     Blood sugars have been high since starting Mounjaro, very agreeable to stopping Mounjaro     5/11/23 -  177 mg/dL   5/8/23 -  176 recheck 180     Another day was 161 recheck 145    Will keep appointment as scheduled     Also asking about Glimepiride - should she resume this, or stay off?     Lucia MCKNIGHT, Triage RN  [facility]

## 2023-05-16 NOTE — TELEPHONE ENCOUNTER
Discussed MTM response below with patient, agreeable to recommendation    Lucia MCKNIGHT, Triage RN  [facility]

## 2023-05-16 NOTE — TELEPHONE ENCOUNTER
I agree this is likely due to Mounjaro, she had a similar episode last month.  I'm ok with her stopping Mounjaro - we have a follow-up scheduled on 5/30.  Can you confirm how her blood sugar has been the last few days?    Thanks!    Liberty Morin, PharmD, [facility]  Medication Therapy Management Provider  Pager: [phone]

## 2023-05-26 NOTE — TELEPHONE ENCOUNTER
Preceptor Attestation:   Patient seen, evaluated and discussed with the resident. I have verified the content of the note, which accurately reflects my assessment of the patient and the plan of care.   Supervising Physician:  Nicola Dow MD    Reason for Call:  Other prescription    Detailed comments: Patient wants to know how long she should be using  neomycin-polymyxin-hydrocortisone (CORTISPORIN), please call her to discuss        Phone Number Patient can be reached at: Home number on file [phone] (home)    Best Time: anytime    Can we leave a detailed message on this number? YES    Call taken on 3/6/2017 at 4:04 PM by Yobany Solorio

## 2023-05-30 ENCOUNTER — VIRTUAL VISIT (OUTPATIENT)
Dept: PHARMACY | Facility: CLINIC | Age: 88
End: 2023-05-30
Payer: COMMERCIAL

## 2023-05-30 DIAGNOSIS — E11.9 TYPE 2 DIABETES MELLITUS WITHOUT COMPLICATION, WITHOUT LONG-TERM CURRENT USE OF INSULIN (H): Primary | ICD-10-CM

## 2023-05-30 PROCEDURE — 99207 PR NO CHARGE LOS: CPT | Performed by: PHARMACIST

## 2023-05-30 NOTE — PROGRESS NOTES
"Medication Therapy Management (MTM) Encounter    ASSESSMENT:                            Medication Adherence/Access: No issues identified    Type 2 Diabetes: Stable. Patient is meeting A1c goal of < 7%. Self monitoring of blood glucose is at goal of fasting  mg/dL.    PLAN:                            Continue current medication regimen    Follow-up: Return in about 3 months (around 8/30/2023) for Follow-up Medication Review.    SUBJECTIVE/OBJECTIVE:                          Kristyn Andre is a 89 year old female called for a follow-up visit.  Today's visit is a follow-up MTM visit from 4/12/2023.     Reason for visit: Diabetes follow-up.    Tobacco: She reports that she quit smoking about 50 years ago. Her smoking use included cigarettes. She started smoking about 51 years ago. She has a 0.25 pack-year smoking history. She has never used smokeless tobacco.  Alcohol: 0-1 beverages per week    Medication Adherence/Access: no issues reported    Type 2 Diabetes:  Currently taking glimepiride 2mg daily - she stopped Mounjaro due to GI side effects and says \"I'm feeling back to my normal self again.\"    Blood sugar monitoring: A few times a week - reports readings are now 80-130mg/dL  Symptoms of low blood sugar? none  Symptoms of high blood sugar? none  Eye exam: due  Foot exam: up to date  Aspirin: Not taking due to age/taking clopidogrel  Statin: Yes: atorvastatin 20mg daily   ACEi/ARB: No- previous intolerances.   Urine Albumin:   Lab Results   Component Value Date    UMALCR 616.83 (H) 03/09/2023      Lab Results   Component Value Date    A1C 6.9 02/24/2023    A1C 6.8 10/24/2022    A1C 6.7 06/27/2022    A1C 7.2 03/03/2022    A1C 6.6 08/27/2021    A1C 6.2 08/24/2020    A1C 6.5 01/20/2020    A1C 6.1 09/20/2019    A1C 6.1 06/19/2019    A1C 6.4 03/04/2019     Today's Vitals: There were no vitals taken for this visit.  ----------------    I spent 5 minutes with this patient today. A copy of the visit note was provided " to the patient's provider(s).    A summary of these recommendations was declined by the patient.    Liberty Morin PharmD, [facility]  Medication Therapy Management Provider  Pager: [phone]     Telemedicine Visit Details  Type of service:  Telephone visit  Start Time: 3:47 PM  End Time: 3:52 PM     Medication Therapy Recommendations  No medication therapy recommendations to display

## 2023-05-30 NOTE — PATIENT INSTRUCTIONS
"Recommendations from today's MTM visit:                                                    MTM (medication therapy management) is a service provided by a clinical pharmacist designed to help you get the most of out of your medicines.   Today we reviewed what your medicines are for, how to know if they are working, that your medicines are safe and how to make your medicine regimen as easy as possible.      Continue current medication regimen    Follow-up: Return in about 3 months (around 8/30/2023) for Follow-up Medication Review.    It was great speaking with you today.  I value your experience and would be very thankful for your time in providing feedback in our clinic survey. In the next few days, you may receive an email or text message from [employer] with a link to a survey related to your  clinical pharmacist.\"     To schedule another MTM appointment, please call the clinic directly or you may call the MTM scheduling line at [phone] or toll-free at [phone].     My Clinical Pharmacist's contact information:                                                      Please feel free to contact me with any questions or concerns you have.      Liberty Morin, [place], [facility]  Medication Therapy Management Provider  Pager: [phone]    "

## 2023-06-26 ENCOUNTER — OFFICE VISIT (OUTPATIENT)
Dept: FAMILY MEDICINE | Facility: CLINIC | Age: 88
End: 2023-06-26
Payer: COMMERCIAL

## 2023-06-26 VITALS
TEMPERATURE: 97.4 F | BODY MASS INDEX: 46.87 KG/M2 | OXYGEN SATURATION: 95 % | DIASTOLIC BLOOD PRESSURE: 84 MMHG | HEART RATE: 86 BPM | HEIGHT: 65 IN | SYSTOLIC BLOOD PRESSURE: 138 MMHG | RESPIRATION RATE: 24 BRPM | WEIGHT: 281.3 LBS

## 2023-06-26 DIAGNOSIS — E11.21 TYPE 2 DIABETES MELLITUS WITH DIABETIC NEPHROPATHY, WITHOUT LONG-TERM CURRENT USE OF INSULIN (H): Primary | ICD-10-CM

## 2023-06-26 DIAGNOSIS — E66.2 OBESITY HYPOVENTILATION SYNDROME (H): ICD-10-CM

## 2023-06-26 DIAGNOSIS — Z23 HIGH PRIORITY FOR 2019-NCOV VACCINE: ICD-10-CM

## 2023-06-26 DIAGNOSIS — K43.9 VENTRAL HERNIA WITHOUT OBSTRUCTION OR GANGRENE: ICD-10-CM

## 2023-06-26 DIAGNOSIS — E66.01 MORBID OBESITY (H): ICD-10-CM

## 2023-06-26 DIAGNOSIS — F32.1 MODERATE MAJOR DEPRESSION (H): ICD-10-CM

## 2023-06-26 DIAGNOSIS — G47.33 OSA ON CPAP: ICD-10-CM

## 2023-06-26 DIAGNOSIS — M48.07 SPINAL STENOSIS OF LUMBOSACRAL REGION: ICD-10-CM

## 2023-06-26 DIAGNOSIS — H61.23 BILATERAL IMPACTED CERUMEN: ICD-10-CM

## 2023-06-26 DIAGNOSIS — Z23 NEED FOR VACCINATION: ICD-10-CM

## 2023-06-26 LAB
ANION GAP SERPL CALCULATED.3IONS-SCNC: 14 MMOL/L (ref 7–15)
BUN SERPL-MCNC: 29.8 MG/DL (ref 8–23)
CALCIUM SERPL-MCNC: 9.5 MG/DL (ref 8.8–10.2)
CHLORIDE SERPL-SCNC: 106 MMOL/L (ref 98–107)
CHOLEST SERPL-MCNC: 140 MG/DL
CREAT SERPL-MCNC: 1.7 MG/DL (ref 0.51–0.95)
DEPRECATED HCO3 PLAS-SCNC: 23 MMOL/L (ref 22–29)
GFR SERPL CREATININE-BSD FRML MDRD: 28 ML/MIN/1.73M2
GLUCOSE SERPL-MCNC: 83 MG/DL (ref 70–99)
HBA1C MFR BLD: 6.5 % (ref 0–5.6)
HDLC SERPL-MCNC: 45 MG/DL
LDLC SERPL CALC-MCNC: 57 MG/DL
NONHDLC SERPL-MCNC: 95 MG/DL
POTASSIUM SERPL-SCNC: 4.7 MMOL/L (ref 3.4–5.3)
SODIUM SERPL-SCNC: 143 MMOL/L (ref 136–145)
TRIGL SERPL-MCNC: 189 MG/DL
TSH SERPL DL<=0.005 MIU/L-ACNC: 2.25 UIU/ML (ref 0.3–4.2)

## 2023-06-26 PROCEDURE — 90471 IMMUNIZATION ADMIN: CPT | Performed by: INTERNAL MEDICINE

## 2023-06-26 PROCEDURE — 0134A COVID-19 BIVALENT 18+ (MODERNA): CPT | Performed by: INTERNAL MEDICINE

## 2023-06-26 PROCEDURE — 91313 COVID-19 BIVALENT 18+ (MODERNA): CPT | Performed by: INTERNAL MEDICINE

## 2023-06-26 PROCEDURE — 69209 REMOVE IMPACTED EAR WAX UNI: CPT | Mod: 50 | Performed by: INTERNAL MEDICINE

## 2023-06-26 PROCEDURE — 84443 ASSAY THYROID STIM HORMONE: CPT | Performed by: INTERNAL MEDICINE

## 2023-06-26 PROCEDURE — 80061 LIPID PANEL: CPT | Performed by: INTERNAL MEDICINE

## 2023-06-26 PROCEDURE — 36415 COLL VENOUS BLD VENIPUNCTURE: CPT | Performed by: INTERNAL MEDICINE

## 2023-06-26 PROCEDURE — 99215 OFFICE O/P EST HI 40 MIN: CPT | Mod: 25 | Performed by: INTERNAL MEDICINE

## 2023-06-26 PROCEDURE — 83036 HEMOGLOBIN GLYCOSYLATED A1C: CPT | Performed by: INTERNAL MEDICINE

## 2023-06-26 PROCEDURE — 80048 BASIC METABOLIC PNL TOTAL CA: CPT | Performed by: INTERNAL MEDICINE

## 2023-06-26 PROCEDURE — 90714 TD VACC NO PRESV 7 YRS+ IM: CPT | Performed by: INTERNAL MEDICINE

## 2023-06-26 ASSESSMENT — PAIN SCALES - GENERAL: PAINLEVEL: MODERATE PAIN (5)

## 2023-06-26 ASSESSMENT — ASTHMA QUESTIONNAIRES
QUESTION_2 LAST FOUR WEEKS HOW OFTEN HAVE YOU HAD SHORTNESS OF BREATH: MORE THAN ONCE A DAY
QUESTION_3 LAST FOUR WEEKS HOW OFTEN DID YOUR ASTHMA SYMPTOMS (WHEEZING, COUGHING, SHORTNESS OF BREATH, CHEST TIGHTNESS OR PAIN) WAKE YOU UP AT NIGHT OR EARLIER THAN USUAL IN THE MORNING: NOT AT ALL
QUESTION_4 LAST FOUR WEEKS HOW OFTEN HAVE YOU USED YOUR RESCUE INHALER OR NEBULIZER MEDICATION (SUCH AS ALBUTEROL): TWO OR THREE TIMES PER WEEK
QUESTION_1 LAST FOUR WEEKS HOW MUCH OF THE TIME DID YOUR ASTHMA KEEP YOU FROM GETTING AS MUCH DONE AT WORK, SCHOOL OR AT HOME: NONE OF THE TIME
QUESTION_5 LAST FOUR WEEKS HOW WOULD YOU RATE YOUR ASTHMA CONTROL: WELL CONTROLLED
ACT_TOTALSCORE: 18
ACT_TOTALSCORE: 18

## 2023-06-26 NOTE — LETTER
June 29, 2023      Kristyn Andre  [address]  [place] [place] [facility] [address]        Dear ,    We are writing to inform you of your test results.    Mert Pizarro,     This is to inform you regarding your test result.     Your total cholesterol is normal.   HDL which is called good cholesterol is low   Your LDL cholesterol is normal.  This is often call bad cholesterol and high levels increase the risk for heart attacks and strokes.   The triglycerides are high. Lowering  the amount of sugar ,alcohol and sweets in the diet helps to control this.Exercise and weight loss helps.   Chronic kidney disease is a stable.   Stay well-hydrated   HbA1c which is average glucose during last 3 months is 6.5%   Your diabetes is under good control   TSH which is thyroid hormone is normal.       Sincerely,       Dr.Nasima Kerline MD,FACP     Resulted Orders   Lipid panel reflex to direct LDL Non-fasting   Result Value Ref Range    Cholesterol 140 <200 mg/dL    Triglycerides 189 (H) <150 mg/dL    Direct Measure HDL 45 (L) >=50 mg/dL    LDL Cholesterol Calculated 57 <=100 mg/dL    Non HDL Cholesterol 95 <130 mg/dL    Narrative    Cholesterol  Desirable:  <200 mg/dL    Triglycerides  Normal:  Less than 150 mg/dL  Borderline High:  150-199 mg/dL  High:  200-499 mg/dL  Very High:  Greater than or equal to 500 mg/dL    Direct Measure HDL  Female:  Greater than or equal to 50 mg/dL   Male:  Greater than or equal to 40 mg/dL    LDL Cholesterol  Desirable:  <100mg/dL  Above Desirable:  100-129 mg/dL   Borderline High:  130-159 mg/dL   High:  160-189 mg/dL   Very High:  >= 190 mg/dL    Non HDL Cholesterol  Desirable:  130 mg/dL  Above Desirable:  130-159 mg/dL  Borderline High:  160-189 mg/dL  High:  190-219 mg/dL  Very High:  Greater than or equal to 220 mg/dL   Hemoglobin A1c   Result Value Ref Range    Hemoglobin A1C 6.5 (H) 0.0 - 5.6 %      Comment:      Normal <5.7%   Prediabetes 5.7-6.4%    Diabetes 6.5% or higher     Note:  Adopted from ADA consensus guidelines.   TSH with free T4 reflex   Result Value Ref Range    TSH 2.25 0.30 - 4.20 uIU/mL   Basic metabolic panel  (Ca, Cl, CO2, Creat, Gluc, K, Na, BUN)   Result Value Ref Range    Sodium 143 136 - 145 mmol/L    Potassium 4.7 3.4 - 5.3 mmol/L    Chloride 106 98 - 107 mmol/L    Carbon Dioxide (CO2) 23 22 - 29 mmol/L    Anion Gap 14 7 - 15 mmol/L    Urea Nitrogen 29.8 (H) 8.0 - 23.0 mg/dL    Creatinine 1.70 (H) 0.51 - 0.95 mg/dL    Calcium 9.5 8.8 - 10.2 mg/dL    Glucose 83 70 - 99 mg/dL    GFR Estimate 28 (L) >60 mL/min/1.73m2

## 2023-06-26 NOTE — PATIENT INSTRUCTIONS
Use your C-pap machine regularly  Otherwise you will feel tired and it will affect your Blood Pressure   Start taking Rybelsus 3 mg daily for 30 days and let me know how that works for you so we can bump up the dose   Labs today  Follow up in 4 months.  Seek sooner medical attention if there is any worsening of symptoms or problems.

## 2023-06-26 NOTE — NURSING NOTE
Patient identified using two patient identifiers.  Ear exam showing wax occlusion completed by provider.  Solution: warm water was placed in the both ear(s) via irrigation tool: elephant ear.      Traci Benavides MA on 6/26/2023 at 2:46 PM

## 2023-06-26 NOTE — PROGRESS NOTES
Prior to immunization administration, verified patients identity using patient s name and date of birth. Please see Immunization Activity for additional information.     Screening Questionnaire for Adult Immunization    Are you sick today?   No   Do you have allergies to medications, food, a vaccine component or latex?   Yes,spirin   Have you ever had a serious reaction after receiving a vaccination?   No   Do you have a long-term health problem with heart, lung, kidney, or metabolic disease (e.g., diabetes), asthma, a blood disorder, no spleen, complement component deficiency, a cochlear implant, or a spinal fluid leak?  Are you on long-term aspirin therapy?   Yes,kidney,diabetes,heart   Do you have cancer, leukemia, HIV/AIDS, or any other immune system problem?   No   Do you have a parent, brother, or sister with an immune system problem?   No   In the past 3 months, have you taken medications that affect  your immune system, such as prednisone, other steroids, or anticancer drugs; drugs for the treatment of rheumatoid arthritis, Crohn s disease, or psoriasis; or have you had radiation treatments?   No   Have you had a seizure, or a brain or other nervous system problem?   No   During the past year, have you received a transfusion of blood or blood    products, or been given immune (gamma) globulin or antiviral drug?   No   For women: Are you pregnant or is there a chance you could become       pregnant during the next month?   No   Have you received any vaccinations in the past 4 weeks?   No     Immunization questionnaire was positive for at least one answer.  Notified PCP. Td administered along with moderna biv booster      Patient instructed to remain in clinic for 15 minutes afterwards, and to report any adverse reactions.     Screening performed by Traci Benavides MA on 6/26/2023 at 2:40 PM.

## 2023-06-26 NOTE — PROGRESS NOTES
Moira was seen today for diabetes, recheck medication and health maintenance.    Diagnoses and all orders for this visit:    Type 2 diabetes mellitus with diabetic nephropathy, without long-term current use of insulin (H)  She follows MTM. She discontinued Mounjaro since she developed GI issues such as nausea after starting it.  Her symptoms resolved shortly after stopping. She is now taking amaryl 2 mg with MTM recommendation. She has scheduled for eye exam in next week. In addition, per patient, after she took mounjaro, her BGL has been elevating.   -     Semaglutide (RYBELSUS) 3 MG tablet; Take 3 mg by mouth daily  -     Lipid panel reflex to direct LDL Non-fasting; Future  -     Hemoglobin A1c; Future  -     TSH with free T4 reflex; Future  -     Basic metabolic panel  (Ca, Cl, CO2, Creat, Gluc, K, Na, BUN); Future  Discussed about starting low dose Rybelsus to help with both her diabetes and weight loss  - if she was not able to tolerate it, we will discontinue the medication. Follow up in 3 weeks.   Lab Results   Component Value Date    A1C 6.5 06/26/2023    A1C 6.9 02/24/2023    A1C 6.8 10/24/2022    A1C 6.7 06/27/2022    A1C 7.2 03/03/2022    A1C 6.2 08/24/2020    A1C 6.5 01/20/2020    A1C 6.1 09/20/2019    A1C 6.1 06/19/2019    A1C 6.4 03/04/2019       Moderate major depression (H)  She is on wellbutrin and duloxetine    Obesity hypoventilation syndrome (H)  Educated patient about the importance of healthy diet and physical activities     Spinal stenosis of lumbosacral region  Using walker to assist with ambulation.   She has limited activities because of the pain, but is still able to do daily activities.   Advised patient to do project slowly and not to exert herself.     Ventral hernia without obstruction or gangrene  Stable   She does not want any surgical invention as per patient she was seen by general surgeon  Surgery would be more harmful than good  She will not tolerate anesthesia as she is  morbidly obese and the hernia size is very large    Bilateral impacted cerumen  Ear wash done by nurse     ELIGIO on CPAP  Patient does not use CPAP regularly since couple months ago - this might be attributing to her fatigue.    Advised patient to use Cpap on regular basis since it can affect blood pressure     Morbid obesity (H)  Comments:  BMI > 46 with diabetes, hypertension and hyperlipidemia     Other  Patient c/o swelling in her arms. She even visited ED for it but etiology was unknown. Follow up in 3 weeks.   Patient receives Tdap and COVID-19 booster today   Blood pressure on arrival elevated at 143/83. Rechecked blood pressure at 138/84    Subjective   Kristyn is a 89 year old, presenting for the following health issues:  Diabetes, Recheck Medication, Health Maintenance (Eye exam scheduled for this Friday [address] ([address] and Imm/Inj (COVID-19 VACCINE)    History of Present Illness     Asthma:  She presents for follow up of asthma.  She has some cough, some wheezing, and some shortness of breath. She is using a relief medication a few times a month. She does not miss any doses of her controller medication throughout the week.Patient is aware of the following triggers: unaware of any triggers and same as previous visit. The patient has not had a visit to the Emergency Room, Urgent Care or Hospital due to asthma since the last clinic visit.     Back Pain:  She presents for follow up of back pain. Patient's back pain is a recurring problem.  Location of back pain:  Right middle of back and left middle of back  Description of back pain: dull ache  Back pain spreads: nowhere    Since patient first noticed back pain, pain is: gradually worsening  Does back pain interfere with her job:  Yes      Mental Health Follow-up:  Patient presents to follow-up on Depression.Patient's depression since last visit has been:  No change  The patient is not having other symptoms associated with depression.      Any  "significant life events: No  Patient is not feeling anxious or having panic attacks.  Patient has no concerns about alcohol or drug use.    Diabetes:   She presents for follow up of diabetes.  She is checking home blood glucose a few times a month. She checks blood glucose before meals.  Blood glucose is sometimes over 200 and never under 70. When her blood glucose is low, the patient is asymptomatic for confusion, blurred vision, lethargy and reports not feeling dizzy, shaky, or weak.  She has no concerns regarding her diabetes at this time.  She is having burning in feet. The patient has not had a diabetic eye exam in the last 12 months.         Hypertension: She presents for follow up of hypertension.  She does not check blood pressure  regularly outside of the clinic. Outside blood pressures have been over 140/90. She follows a low salt diet.     She eats 0-1 servings of fruits and vegetables daily.She consumes 0 sweetened beverage(s) daily.She exercises with enough effort to increase her heart rate 9 or less minutes per day.  She exercises with enough effort to increase her heart rate 3 or less days per week.      Kristyn is a 89 year old, presenting for diabetes follow up.     Review of Systems   Constitutional, HEENT, cardiovascular, pulmonary, GI, , musculoskeletal, neuro, skin, endocrine and psych systems are negative, except as otherwise noted.      Objective    /84 (BP Location: Right arm, Patient Position: Sitting)   Pulse 86   Temp 97.4  F (36.3  C) (Oral)   Resp 24   Ht 1.651 m (5' 5\")   Wt 127.6 kg (281 lb 4.8 oz)   SpO2 95%   BMI 46.81 kg/m    Body mass index is 46.81 kg/m .  Physical Exam   GENERAL APPEARANCE: healthy, alert and no distress, morbidly obese  EYES: Eyes grossly normal to inspection, PERRL and conjunctivae and sclerae normal  HENT: Wears hearing aid, bilateral ears cerumen impaction, and nose and mouth without ulcers or lesions  NECK: no adenopathy  RESP: lungs clear to " auscultation - no rales, rhonchi or wheezes  CV: regular rates and rhythm, heart murmur   MSK: Her hands are slightly swollen.       40 minutes spent on the date of the encounter doing chart review, history and exam, documentation and further activities as noted above      Labs pending.     This document serves as a record of the services and decisions personally performed and made by Dr. Churchill. It was created on her behalf by Naomi Garcia, a trained medical scribe. The creation of this document is based the provider's statements to the medical scribe.

## 2023-06-29 ENCOUNTER — TRANSFERRED RECORDS (OUTPATIENT)
Dept: HEALTH INFORMATION MANAGEMENT | Facility: CLINIC | Age: 88
End: 2023-06-29
Payer: COMMERCIAL

## 2023-06-29 LAB — RETINOPATHY: NEGATIVE

## 2023-06-29 NOTE — RESULT ENCOUNTER NOTE
Please notify patient by sending following letter with copy of test results      Amilcarchayo Moira,    This is to inform you regarding your test result.    Your total cholesterol is normal.  HDL which is called good cholesterol is low  Your LDL cholesterol is normal.  This is often call bad cholesterol and high levels increase the risk for heart attacks and strokes.  The triglycerides are high. Lowering  the amount of sugar ,alcohol and sweets in the diet helps to control this.Exercise and weight loss helps.  Chronic kidney disease is a stable.  Stay well-hydrated  HbA1c which is average glucose during last 3 months is 6.5%  Your diabetes is under good control  TSH which is thyroid hormone is normal.        Sincerely,      Dr.Nasima Kerline MD,FACP

## 2023-08-21 DIAGNOSIS — N18.32 STAGE 3B CHRONIC KIDNEY DISEASE (H): Primary | ICD-10-CM

## 2023-08-23 ENCOUNTER — LAB (OUTPATIENT)
Dept: LAB | Facility: CLINIC | Age: 88
End: 2023-08-23
Payer: COMMERCIAL

## 2023-08-23 DIAGNOSIS — N18.32 STAGE 3B CHRONIC KIDNEY DISEASE (H): ICD-10-CM

## 2023-08-23 LAB
ALBUMIN SERPL BCG-MCNC: 4.2 G/DL (ref 3.5–5.2)
ALBUMIN UR-MCNC: 100 MG/DL
ANION GAP SERPL CALCULATED.3IONS-SCNC: 16 MMOL/L (ref 7–15)
APPEARANCE UR: ABNORMAL
BACTERIA #/AREA URNS HPF: ABNORMAL /HPF
BILIRUB UR QL STRIP: NEGATIVE
BUN SERPL-MCNC: 28.3 MG/DL (ref 8–23)
CALCIUM SERPL-MCNC: 9.3 MG/DL (ref 8.8–10.2)
CHLORIDE SERPL-SCNC: 104 MMOL/L (ref 98–107)
COLOR UR AUTO: YELLOW
CREAT SERPL-MCNC: 1.61 MG/DL (ref 0.51–0.95)
CREAT UR-MCNC: 62.9 MG/DL
DEPRECATED HCO3 PLAS-SCNC: 19 MMOL/L (ref 22–29)
FERRITIN SERPL-MCNC: 139 NG/ML (ref 11–328)
GFR SERPL CREATININE-BSD FRML MDRD: 30 ML/MIN/1.73M2
GLUCOSE SERPL-MCNC: 119 MG/DL (ref 70–99)
GLUCOSE UR STRIP-MCNC: NEGATIVE MG/DL
HGB BLD-MCNC: 13.8 G/DL (ref 11.7–15.7)
HGB UR QL STRIP: ABNORMAL
IRON BINDING CAPACITY (ROCHE): 302 UG/DL (ref 240–430)
IRON SATN MFR SERPL: 14 % (ref 15–46)
IRON SERPL-MCNC: 41 UG/DL (ref 37–145)
KETONES UR STRIP-MCNC: NEGATIVE MG/DL
LEUKOCYTE ESTERASE UR QL STRIP: ABNORMAL
MICROALBUMIN UR-MCNC: 284 MG/L
MICROALBUMIN/CREAT UR: 451.51 MG/G CR (ref 0–25)
NITRATE UR QL: POSITIVE
PH UR STRIP: 5.5 [PH] (ref 5–7)
PHOSPHATE SERPL-MCNC: 3.9 MG/DL (ref 2.5–4.5)
POTASSIUM SERPL-SCNC: 5 MMOL/L (ref 3.4–5.3)
PTH-INTACT SERPL-MCNC: 78 PG/ML (ref 15–65)
RBC #/AREA URNS AUTO: ABNORMAL /HPF
SODIUM SERPL-SCNC: 139 MMOL/L (ref 136–145)
SP GR UR STRIP: 1.01 (ref 1–1.03)
UROBILINOGEN UR STRIP-ACNC: 0.2 E.U./DL
WBC #/AREA URNS AUTO: ABNORMAL /HPF
WBC CLUMPS #/AREA URNS HPF: PRESENT /HPF

## 2023-08-23 PROCEDURE — 82306 VITAMIN D 25 HYDROXY: CPT

## 2023-08-23 PROCEDURE — 82728 ASSAY OF FERRITIN: CPT

## 2023-08-23 PROCEDURE — 36415 COLL VENOUS BLD VENIPUNCTURE: CPT

## 2023-08-23 PROCEDURE — 83970 ASSAY OF PARATHORMONE: CPT

## 2023-08-23 PROCEDURE — 83550 IRON BINDING TEST: CPT

## 2023-08-23 PROCEDURE — 85018 HEMOGLOBIN: CPT

## 2023-08-23 PROCEDURE — 82043 UR ALBUMIN QUANTITATIVE: CPT

## 2023-08-23 PROCEDURE — 81001 URINALYSIS AUTO W/SCOPE: CPT

## 2023-08-23 PROCEDURE — 80069 RENAL FUNCTION PANEL: CPT

## 2023-08-23 PROCEDURE — 83540 ASSAY OF IRON: CPT

## 2023-08-23 PROCEDURE — 82570 ASSAY OF URINE CREATININE: CPT

## 2023-08-24 LAB — DEPRECATED CALCIDIOL+CALCIFEROL SERPL-MC: 40 UG/L (ref 20–75)

## 2023-08-28 DIAGNOSIS — E11.21 TYPE 2 DIABETES MELLITUS WITH DIABETIC NEPHROPATHY, WITHOUT LONG-TERM CURRENT USE OF INSULIN (H): ICD-10-CM

## 2023-08-28 NOTE — TELEPHONE ENCOUNTER
"TO PCP    Patient is also wondering if she can have something for pain. She states she is going to have an injection on Friday [facility]. She states she has \"one pill left and I have five days to go. I don't have any pain today but I had it really bad on Sunday.\"    She is Also requesting OxyContin and would like it go to [facility]'s to get it quicker than mail order. Writer did advise ice/heat. States tylenol doesn't work. Did not pend as the first rx has to be approved before pharmacy is changed.     Please advise.     Jossy Hathaway RN on 8/28/2023 at 12:10 PM    "

## 2023-08-28 NOTE — TELEPHONE ENCOUNTER
Rybelsus refill approved.   However, I am covering for Dr Churchill today as PCP out of office and do not see any opioids on her med list so declining her request for temporary narcotics. She does have appt for injection Friday which she should keep for pain relief

## 2023-09-04 NOTE — TELEPHONE ENCOUNTER
Patient calling stating she had called requesting refill for Rybelsus.     Patient is requesting to know if this has been filled? Reviewed with patient 30 tabs sent to [facility] 8/28/23.    Semaglutide (RYBELSUS) 3 MG tablet 30 tablet 0 8/28/2023  No   Sig - Route: Take 3 mg by mouth daily - Oral     Patient stating she had enough remaining for next 2-3 days.    Caller verbalized understanding. Denies further questions.    Jeanette Sheth RN  [facility]

## 2023-09-05 ENCOUNTER — TELEPHONE (OUTPATIENT)
Dept: FAMILY MEDICINE | Facility: CLINIC | Age: 88
End: 2023-09-05
Payer: COMMERCIAL

## 2023-09-05 DIAGNOSIS — E11.21 TYPE 2 DIABETES MELLITUS WITH DIABETIC NEPHROPATHY, WITHOUT LONG-TERM CURRENT USE OF INSULIN (H): Primary | ICD-10-CM

## 2023-09-05 NOTE — TELEPHONE ENCOUNTER
"TO PCP    Patient calling stating pharmacy needs rybelsus to \"go through.\"     Upon review of chart, rybelsus has been sent 08/28/23 with confirmation received. Advised patient to call pharmacy back again.    Jossy Hathaway RN on 9/5/2023 at 9:46 AM    "

## 2023-09-05 NOTE — TELEPHONE ENCOUNTER
Pharmacy faxing with dose question - needs response!  Rybelsus is typically dosed as 3mg for 30 days then increased to 7mg thereafter.  Patient received 3mg filled at local pharmacy on 7/12/2023.    Should patient remain on 3mg   OR  Increase to 7mg? New Rx for 7mg will be needed    Pharmacy needs answer.    LOV 6/26/2023 [place] (when Rybelsus was Rx'd; patient was supposed to follow up 3 weeks after starting medication)    Appointments in Next Year      Oct 26, 2023  1:00 PM  (Arrive by 12:40 PM)  Provider Visit with Maryan Churchill MD  [facility] ([facility] ) [phone]          LOV 5/30/2023 MT[facility] [place]      Needs triage with patient - how is she tolerating new medication?    Will copy [place] on encounter since Dr Churchill is currently out of office.    Yodit Mcbride, RT (R)

## 2023-09-06 ENCOUNTER — OFFICE VISIT (OUTPATIENT)
Dept: NEPHROLOGY | Facility: CLINIC | Age: 88
End: 2023-09-06
Payer: COMMERCIAL

## 2023-09-06 VITALS
BODY MASS INDEX: 45.4 KG/M2 | OXYGEN SATURATION: 95 % | HEART RATE: 67 BPM | WEIGHT: 272.5 LBS | SYSTOLIC BLOOD PRESSURE: 148 MMHG | RESPIRATION RATE: 18 BRPM | DIASTOLIC BLOOD PRESSURE: 86 MMHG | HEIGHT: 65 IN

## 2023-09-06 DIAGNOSIS — R60.9 EDEMA, UNSPECIFIED TYPE: ICD-10-CM

## 2023-09-06 DIAGNOSIS — N18.32 STAGE 3B CHRONIC KIDNEY DISEASE (H): Primary | ICD-10-CM

## 2023-09-06 DIAGNOSIS — E87.20 METABOLIC ACIDOSIS: ICD-10-CM

## 2023-09-06 DIAGNOSIS — I10 HYPERTENSION, ESSENTIAL: ICD-10-CM

## 2023-09-06 DIAGNOSIS — D50.9 IRON DEFICIENCY ANEMIA, UNSPECIFIED IRON DEFICIENCY ANEMIA TYPE: ICD-10-CM

## 2023-09-06 PROCEDURE — 99214 OFFICE O/P EST MOD 30 MIN: CPT | Performed by: PHYSICIAN ASSISTANT

## 2023-09-06 ASSESSMENT — PAIN SCALES - GENERAL: PAINLEVEL: NO PAIN (0)

## 2023-09-06 NOTE — TELEPHONE ENCOUNTER
Writer called and spoke with patient and notified of new rx being sent, patient appreciative and will follow up with pharmacy and callback if anything further is needed.    Closing encounter.    Mora Yañez RN  [facility]

## 2023-09-06 NOTE — NURSING NOTE
"Chief Complaint   Patient presents with    RECHECK     Stage 3b chronic kidney disease (H)       Vitals:    09/06/23 1049   BP: (!) 153/79   BP Location: Left arm   Patient Position: Sitting   Cuff Size: Adult Regular   Pulse: 73   Resp: 18   SpO2: 95%   Weight: 123.6 kg (272 lb 8 oz)   Height: 1.651 m (5' 5\")       Body mass index is 45.35 kg/m .    "

## 2023-09-06 NOTE — PROGRESS NOTES
Nephrology Progress Note  09/06/2023         Assessment & Recommendations:   #CKD stage 3b  eGFR around 33-35 mL/min  With tubular range   proteinuria and a UACR around 572  Mg/g on 2/24  A renal ultrasound done in July 2022 shows acquired bilateral cysts only  Her CKD has been fairly stable and the potential responsible factors include  Longstanding type 2 DM, HTN, acquired cystic disease, severe obesity and normal decline related to age. Tirzepatide should be beneficial for the proteinuria and also help with weight loss as at this point it is severe obesity that is mostly concerning in terms of outcomes. I will also initiate dapagliflozin 10 mg daily for the same purpose and recheck a CMP in 2 weeks  No documented intake of NSAIDs however she is on ibandronate and her renal function will need to be monitored on that   The patient was also instructed to keep the sodium intake around 2400 mg /day, follow a plant-based diet and to avoid NSAIDs     #HTN  Primary and secondary to CKD. per chart review, she is no longer on amlodipine, unclear why it was stopped. She is not taking farxiga, due to cost?  Current regimen: metoprolol succinate 100 mg daily and hydralazine 25 mg twice daily.    - BP in clinic 153/79 -> 175/80-> 148/86  - start checking BP at home and keep log. Nurse will call in 2 weeks for report     #Type 2 DM   Hba1c 6.7-> 6.9   - tried mounjaro but stopped due to GI side effects  - management per primary and pharmacy     #CVD/dyslipidemia  On a statin as indicated for any patient with CKD above the age of 50 and well controlled     #Blood count  Hemoglobin 12.9 -> 13.5  No need for any additional work up or intervention     #Acid-base status  CO2 level 21. If remains will start her on oral bicarbonate therapy at her next visit     #Electrolytes  Na 140  K 4.8  No acute issues     #BMD on ibandronate 150 mg  Once monthly for osteoporosis  Ca   9.3      P  3.9   Alb 4.2  Vitamin D level  40 in Sep 2023   -  continue cholecalciferol 2000 U daily.   - check vitamin D yearly     #CKD journey/transplant not a candidate yet     #Disposition: labs and follow up in 3 months with me.     History of present illness:   Moira Andre is an 89 year old female who first presented for evaluation of CKD stage 3 in September 2022.  The past medical history is significant for longstanding type 2 DM, HTN and severe obesity with a BMI at 47.34 kg/m2 and CKD since at least 2018 with a baseline creatinine between 1.3 and 1.5 mg/dL. The latest creatinine value done on 2/24 is 1.46 mg/dL. A UACR done on 09/01/2021 is 259.75 mg/g Cr and on 2/24 is 572 mg/g Cr.     A renal ultrasound done on 7/8/2022 shows:  FINDINGS: Exam is limited secondary to patient not being able to hold  his breath and also patient had heavy breathing.     The right kidney measures 11.2 x 5.5 x 5.4 cm. The right renal cortex  measures 1.2 cm in thickness. There is no hydronephrosis. Renal  cortical echogenicity is unremarkable. Hypoechoic cysts are noted in  the superior pole of the right kidney measuring 4.8 x 4.6 x 4.3 cm,  previously 3.5 cm. There is an anechoic simple cyst in the inferior  pole of the left kidney measuring 3.0 x 3.4 x 3.3 cm, previously 3.4  cm. Typically no specific follow-up is needed for this finding.     Spectral waveform analysis was performed. The peak systolic velocities  in the right renal artery at its hilum, mid and origin are 52, 34 and  59 cm/sec respectively. Low resistance waveforms are identified in the  right renal artery. The resistance indices in the right arcuate  arteries range between 0.63-0.75. RAR ratios range between 0.2 to 0.4.     The left kidney measures 10.9 x 5.5 x 5.4 cm. The left renal cortex  measures 1.4 cm in thickness. There is no hydronephrosis. Renal  cortical echogenicity is unremarkable. Simple cysts are noted in the  left kidney measuring up to 3.6 x 2.5 x 3.0 cm. Typically no specific  follow-up is needed  for this finding     Spectral waveform analysis was performed. The peak systolic velocities  in the left renal artery at its hilum, mid and origin are 117, 101 and  122 cm/sec respectively. Low resistance waveforms are identified in  the left renal artery. The resistance indices in the left arcuate  arteries range between 0.54-0.7. RAR ratios range between 0.7 to 0.8.     The peak systolic velocity in the abdominal aorta superior to the  origin of the renal arteries is 144 cm/s.     The bladder is normal.  The patient denies any dysuria, any pollakiuria, any nocturia, any LE edema, any dyspnea on exertion .     The patient's glycemia has been fairly controlled with an Hba1c at 6.7 in June 2022 and at 6.9 on 2/24. On her last visit to her PCP on 2/24 it was decided to stop glimepiride and switch her instead to tirzepatide to help her with weight loss.      The patient's blood pressure has also been fairly well controlled in the past. She was on amlodipine 5 mg daily, metoprolol succinate 100 mg daily and hydralazine 25 mg twice daily. She stopped amlodipine since last visit and has not been checking BP's at home.      The patient has chronic generalized pain for which she has been taking acetaminophen and oxycodone. She denies any NSAIDs use. Of note, she was started on ibandronate 150 mg daily in April 2022. She reports limited mobility due to her severe obesity and dyspnea on mild exertion. She has no LE edema. She denies n/v/d.     She did not start farxiga due to cost and did not tolerate mounjaro due to GI side effects.        Review of Systems:   A comprehensive review of systems was negative except as noted above.    Physical Exam:   Vitals: /80  Pulse 73  Resp 18  SpO2 95%  GENERAL APPEARANCE: alert and no distress  PULM: lungs clear to auscultation, equal air movement, no cyanosis or clubbing  CV: regular rhythm, normal rate, no rub  NEURO: mentation intact and speech normal  Extremities:    Labs:    All labs reviewed by me  Electrolytes/Renal -   Recent Labs   Lab Test 08/23/23  0959 06/26/23  1421 04/19/23  1903 03/09/23  0950 02/24/23  1125 10/24/22  1245    143 140   < > 138 140   POTASSIUM 5.0 4.7 4.9   < > 4.5 4.9   CHLORIDE 104 106 103   < > 102 109   CO2 19* 23 24   < > 21* 27   BUN 28.3* 29.8* 26.0*   < > 29.0* 38*   CR 1.61* 1.70* 1.65*   < > 1.46* 1.59*   * 83 135*   < > 184* 80   TELLY 9.3 9.5 9.6   < > 9.7 9.0   PHOS 3.9  --   --   --  3.3 3.4    < > = values in this interval not displayed.       CBC -   Recent Labs   Lab Test 08/23/23  0959 04/19/23  1903 04/19/23  1213 04/13/23  1803   WBC  --  9.9 10.6 12.1*   HGB 13.8 12.8 13.2 14.2   PLT  --  391 363 376       LFTs -   Recent Labs   Lab Test 08/23/23  0959 04/19/23  1903 04/19/23  1213 03/09/23  0950   ALKPHOS  --  76 77 74   BILITOTAL  --  0.3 0.3 0.3   ALT  --  7* 7* 8*   AST  --  14 13 16   PROTTOTAL  --  7.5 7.7 7.4   ALBUMIN 4.2 3.8 3.8 4.1       Iron Panel -   Recent Labs   Lab Test 08/23/23  0959   IRON 41   IRONSAT 14*   CURT 139         Imaging:  Reviewed        AMANDA BERMUDEZC    Visit length 15 minutes. An additional 15 minutes were spent on date of service in chart review, documentation, and other activities as noted.

## 2023-09-06 NOTE — PATIENT INSTRUCTIONS
It was a pleasure taking care of you today.  I've included a brief summary of our discussion and care plan from today's visit below.  Please review this information with your primary care provider.  _______________________________________________________________________    My recommendations are summarized as follows:  Check blood pressure daily at home, one hour after morning medications and keep log.   Avoid ibuprofen/aleve.  Continue good hydration and low sodium diet, no more than 2400 mg daily.   Labs and follow up in 3 months.     To schedule imaging please call [phone]     To schedule your lab appointment at [facility] 1st floor lab call [phone]     _______________________________________________________________________    Who do I call with any questions after my visit?    Please be in touch if there are any further questions that arise following today's visit.  There are multiple ways to contact your nephrology care team.      During business hours, you may reach your Nephrology RN Care Coordinator, Michelle, at [phone].      To schedule or reschedule an appointment, please call [phone].     You can always send a secure message through [employer].  [employer] messages are answered by your nurse or doctor typically within 24 hours.  Please allow extra time on weekends and holidays.      For urgent/emergent questions after business hours, you may reach the on-call Nephrology Fellow by contacting the [facility] at [phone].     How will I get the results of any tests ordered?    You will receive all of your results.  If you have signed up for [employer], any tests ordered at your visit will be available to you after your physician reviews them.  Typically this takes 1-2 weeks.  If there are urgent results that require a change in your care plan, your physician or nurse will call you to discuss the next steps.      What is MyChart?  [employer] is a secure way for you to  access all of your healthcare records from the [facility].  It is a web based computer program, so you can sign on to it from any location.  It also allows you to send secure messages to your care team.  I recommend signing up for [employer] access if you have not already done so and are comfortable with using a computer.      How do I schedule a follow-up visit?  If you did not schedule a follow-up visit today, please call [phone] to schedule a follow-up office visit.        Sincerely,    SEBASTIEN [facility]  Division of Nephrology and Hypertension

## 2023-09-14 DIAGNOSIS — I25.10 CORONARY ARTERY DISEASE INVOLVING NATIVE CORONARY ARTERY OF NATIVE HEART WITHOUT ANGINA PECTORIS: ICD-10-CM

## 2023-09-14 DIAGNOSIS — E78.5 HYPERLIPIDEMIA LDL GOAL <70: ICD-10-CM

## 2023-09-15 RX ORDER — CLOPIDOGREL BISULFATE 75 MG/1
TABLET ORAL
Qty: 100 TABLET | Refills: 1 | Status: ON HOLD | OUTPATIENT
Start: 2023-09-15 | End: 2023-12-31

## 2023-09-15 RX ORDER — ATORVASTATIN CALCIUM 20 MG/1
TABLET, FILM COATED ORAL
Qty: 100 TABLET | Refills: 2 | Status: SHIPPED | OUTPATIENT
Start: 2023-09-15 | End: 2024-02-22

## 2023-09-20 ENCOUNTER — PATIENT OUTREACH (OUTPATIENT)
Dept: NEPHROLOGY | Facility: CLINIC | Age: 88
End: 2023-09-20
Payer: COMMERCIAL

## 2023-09-20 NOTE — PROGRESS NOTES
Please call pt in 2 weeks for BP report.     Thanks   Dora       Writer called patient, patient hasn't been taking BP and doesn't know where her machine is. Patient states she is going to try and find it. Writer to call back in a week or so for an update.      Michelle ALLEN RN  Nephrology Care Coordinator  [facility]

## 2023-09-25 ENCOUNTER — PATIENT OUTREACH (OUTPATIENT)
Dept: CARE COORDINATION | Facility: CLINIC | Age: 88
End: 2023-09-25
Payer: COMMERCIAL

## 2023-10-03 NOTE — PROGRESS NOTES
Still hasn't found her machine. Writer offered to send a new BP cuff, patient declined and would like to try and find hers still. Writer to touch base again in 1-2 weeks.      Michelle ALLEN RN  Nephrology Care Coordinator  [facility]

## 2023-10-09 ENCOUNTER — PATIENT OUTREACH (OUTPATIENT)
Dept: CARE COORDINATION | Facility: CLINIC | Age: 88
End: 2023-10-09
Payer: COMMERCIAL

## 2023-10-10 ENCOUNTER — HOSPITAL ENCOUNTER (INPATIENT)
Facility: CLINIC | Age: 88
LOS: 3 days | Discharge: HOME | DRG: 291 | End: 2023-10-13
Attending: EMERGENCY MEDICINE | Admitting: STUDENT IN AN ORGANIZED HEALTH CARE EDUCATION/TRAINING PROGRAM
Payer: COMMERCIAL

## 2023-10-10 ENCOUNTER — OFFICE VISIT (OUTPATIENT)
Dept: URGENT CARE | Facility: URGENT CARE | Age: 88
End: 2023-10-10
Payer: COMMERCIAL

## 2023-10-10 ENCOUNTER — APPOINTMENT (OUTPATIENT)
Dept: ULTRASOUND IMAGING | Facility: CLINIC | Age: 88
DRG: 291 | End: 2023-10-10
Attending: EMERGENCY MEDICINE
Payer: COMMERCIAL

## 2023-10-10 ENCOUNTER — APPOINTMENT (OUTPATIENT)
Dept: GENERAL RADIOLOGY | Facility: CLINIC | Age: 88
DRG: 291 | End: 2023-10-10
Attending: EMERGENCY MEDICINE
Payer: COMMERCIAL

## 2023-10-10 VITALS
HEART RATE: 120 BPM | BODY MASS INDEX: 46.26 KG/M2 | OXYGEN SATURATION: 91 % | SYSTOLIC BLOOD PRESSURE: 162 MMHG | WEIGHT: 278 LBS | DIASTOLIC BLOOD PRESSURE: 94 MMHG | TEMPERATURE: 97.4 F

## 2023-10-10 DIAGNOSIS — R06.02 SOB (SHORTNESS OF BREATH): ICD-10-CM

## 2023-10-10 DIAGNOSIS — R63.5 WEIGHT GAIN: ICD-10-CM

## 2023-10-10 DIAGNOSIS — I50.30 NYHA CLASS 3 HEART FAILURE WITH PRESERVED EJECTION FRACTION (H): Primary | ICD-10-CM

## 2023-10-10 DIAGNOSIS — J81.0 ACUTE PULMONARY EDEMA (H): ICD-10-CM

## 2023-10-10 DIAGNOSIS — R79.81 LOW O2 SATURATION: ICD-10-CM

## 2023-10-10 DIAGNOSIS — I48.91 ATRIAL FIBRILLATION WITH RVR (H): ICD-10-CM

## 2023-10-10 DIAGNOSIS — Z87.09 HISTORY OF ASTHMA: ICD-10-CM

## 2023-10-10 DIAGNOSIS — R06.02 SOB (SHORTNESS OF BREATH): Primary | ICD-10-CM

## 2023-10-10 DIAGNOSIS — J96.01 ACUTE RESPIRATORY FAILURE WITH HYPOXIA (H): ICD-10-CM

## 2023-10-10 DIAGNOSIS — I50.33 ACUTE ON CHRONIC DIASTOLIC HEART FAILURE (H): ICD-10-CM

## 2023-10-10 LAB
ACANTHOCYTES BLD QL SMEAR: NORMAL
ALBUMIN SERPL BCG-MCNC: 3.7 G/DL (ref 3.5–5.2)
ALP SERPL-CCNC: ABNORMAL U/L
ALT SERPL W P-5'-P-CCNC: ABNORMAL U/L
ANION GAP SERPL CALCULATED.3IONS-SCNC: 15 MMOL/L (ref 7–15)
AST SERPL W P-5'-P-CCNC: ABNORMAL U/L
ATRIAL RATE - MUSE: NORMAL BPM
AUER BODIES BLD QL SMEAR: NORMAL
BASO STIPL BLD QL SMEAR: NORMAL
BASO+EOS+MONOS # BLD AUTO: ABNORMAL 10*3/UL
BASO+EOS+MONOS NFR BLD AUTO: ABNORMAL %
BASOPHILS # BLD AUTO: 0 10E3/UL (ref 0–0.2)
BASOPHILS NFR BLD AUTO: 0 %
BILIRUB SERPL-MCNC: 0.4 MG/DL
BITE CELLS BLD QL SMEAR: NORMAL
BLISTER CELLS BLD QL SMEAR: NORMAL
BUN SERPL-MCNC: 33.2 MG/DL (ref 8–23)
BURR CELLS BLD QL SMEAR: NORMAL
CALCIUM SERPL-MCNC: 9.3 MG/DL (ref 8.2–9.6)
CHLORIDE SERPL-SCNC: 102 MMOL/L (ref 98–107)
CREAT SERPL-MCNC: 1.42 MG/DL (ref 0.51–0.95)
DACRYOCYTES BLD QL SMEAR: NORMAL
DEPRECATED HCO3 PLAS-SCNC: 18 MMOL/L (ref 22–29)
DIASTOLIC BLOOD PRESSURE - MUSE: NORMAL MMHG
EGFRCR SERPLBLD CKD-EPI 2021: 35 ML/MIN/1.73M2
ELLIPTOCYTES BLD QL SMEAR: NORMAL
EOSINOPHIL # BLD AUTO: 0.1 10E3/UL (ref 0–0.7)
EOSINOPHIL NFR BLD AUTO: 1 %
ERYTHROCYTE [DISTWIDTH] IN BLOOD BY AUTOMATED COUNT: 16.6 % (ref 10–15)
ERYTHROCYTE [DISTWIDTH] IN BLOOD BY AUTOMATED COUNT: 16.7 % (ref 10–15)
FLUAV RNA SPEC QL NAA+PROBE: NEGATIVE
FLUBV RNA RESP QL NAA+PROBE: NEGATIVE
FRAGMENTS BLD QL SMEAR: NORMAL
GLUCOSE SERPL-MCNC: 189 MG/DL (ref 70–99)
HCO3 BLDV-SCNC: 23 MMOL/L (ref 21–28)
HCT VFR BLD AUTO: 38.7 % (ref 35–47)
HCT VFR BLD AUTO: 41 % (ref 35–47)
HGB BLD-MCNC: 12 G/DL (ref 11.7–15.7)
HGB BLD-MCNC: 12.6 G/DL (ref 11.7–15.7)
HGB C CRYSTALS: NORMAL
HOWELL-JOLLY BOD BLD QL SMEAR: NORMAL
IMM GRANULOCYTES # BLD: 0.1 10E3/UL
IMM GRANULOCYTES NFR BLD: 1 %
INTERPRETATION ECG - MUSE: NORMAL
LACTATE BLD-SCNC: 1.6 MMOL/L
LYMPHOCYTES # BLD AUTO: 1.1 10E3/UL (ref 0.8–5.3)
LYMPHOCYTES NFR BLD AUTO: 11 %
MCH RBC QN AUTO: 25.3 PG (ref 26.5–33)
MCH RBC QN AUTO: 25.5 PG (ref 26.5–33)
MCHC RBC AUTO-ENTMCNC: 30.7 G/DL (ref 31.5–36.5)
MCHC RBC AUTO-ENTMCNC: 31 G/DL (ref 31.5–36.5)
MCV RBC AUTO: 82 FL (ref 78–100)
MCV RBC AUTO: 82 FL (ref 78–100)
MONOCYTES # BLD AUTO: 0.6 10E3/UL (ref 0–1.3)
MONOCYTES NFR BLD AUTO: 6 %
NEUTROPHILS # BLD AUTO: 8.3 10E3/UL (ref 1.6–8.3)
NEUTROPHILS NFR BLD AUTO: 81 %
NEUTS HYPERSEG BLD QL SMEAR: NORMAL
NRBC # BLD AUTO: 0 10E3/UL
NRBC BLD AUTO-RTO: 0 /100
NT-PROBNP SERPL-MCNC: 4629 PG/ML (ref 0–1800)
P AXIS - MUSE: NORMAL DEGREES
PCO2 BLDV: 36 MM HG (ref 40–50)
PH BLDV: 7.41 [PH] (ref 7.32–7.43)
PLAT MORPH BLD: NORMAL
PLATELET # BLD AUTO: 285 10E3/UL (ref 150–450)
PLATELET # BLD AUTO: 291 10E3/UL (ref 150–450)
PO2 BLDV: 45 MM HG (ref 25–47)
POLYCHROMASIA BLD QL SMEAR: NORMAL
POTASSIUM SERPL-SCNC: 4.8 MMOL/L (ref 3.4–5.3)
POTASSIUM SERPL-SCNC: 5.6 MMOL/L (ref 3.4–5.3)
POTASSIUM SERPL-SCNC: 5.8 MMOL/L (ref 3.4–5.3)
PR INTERVAL - MUSE: NORMAL MS
PROCALCITONIN SERPL IA-MCNC: 0.05 NG/ML
PROT SERPL-MCNC: 7.2 G/DL (ref 6.4–8.3)
QRS DURATION - MUSE: 102 MS
QT - MUSE: 316 MS
QTC - MUSE: 413 MS
R AXIS - MUSE: -35 DEGREES
RBC # BLD AUTO: 4.71 10E6/UL (ref 3.8–5.2)
RBC # BLD AUTO: 4.99 10E6/UL (ref 3.8–5.2)
RBC AGGLUT BLD QL: NORMAL
RBC MORPH BLD: NORMAL
ROULEAUX BLD QL SMEAR: NORMAL
RSV RNA SPEC NAA+PROBE: NEGATIVE
SAO2 % BLDV: 82 % (ref 94–100)
SARS-COV-2 RNA RESP QL NAA+PROBE: NEGATIVE
SICKLE CELLS BLD QL SMEAR: NORMAL
SMUDGE CELLS BLD QL SMEAR: NORMAL
SODIUM SERPL-SCNC: 135 MMOL/L (ref 135–145)
SPHEROCYTES BLD QL SMEAR: NORMAL
STOMATOCYTES BLD QL SMEAR: NORMAL
SYSTOLIC BLOOD PRESSURE - MUSE: NORMAL MMHG
T AXIS - MUSE: 83 DEGREES
TARGETS BLD QL SMEAR: NORMAL
TOXIC GRANULES BLD QL SMEAR: NORMAL
TROPONIN T SERPL HS-MCNC: 25 NG/L
TROPONIN T SERPL HS-MCNC: 27 NG/L
VARIANT LYMPHS BLD QL SMEAR: NORMAL
VENTRICULAR RATE- MUSE: 103 BPM
WBC # BLD AUTO: 10.1 10E3/UL (ref 4–11)
WBC # BLD AUTO: 11.5 10E3/UL (ref 4–11)

## 2023-10-10 PROCEDURE — 99285 EMERGENCY DEPT VISIT HI MDM: CPT | Mod: 25

## 2023-10-10 PROCEDURE — 99223 1ST HOSP IP/OBS HIGH 75: CPT | Performed by: STUDENT IN AN ORGANIZED HEALTH CARE EDUCATION/TRAINING PROGRAM

## 2023-10-10 PROCEDURE — 84155 ASSAY OF PROTEIN SERUM: CPT | Performed by: EMERGENCY MEDICINE

## 2023-10-10 PROCEDURE — 210N000002 HC R&B HEART CARE

## 2023-10-10 PROCEDURE — 94640 AIRWAY INHALATION TREATMENT: CPT

## 2023-10-10 PROCEDURE — 250N000013 HC RX MED GY IP 250 OP 250 PS 637: Performed by: EMERGENCY MEDICINE

## 2023-10-10 PROCEDURE — 84145 PROCALCITONIN (PCT): CPT | Performed by: EMERGENCY MEDICINE

## 2023-10-10 PROCEDURE — 99214 OFFICE O/P EST MOD 30 MIN: CPT | Performed by: FAMILY MEDICINE

## 2023-10-10 PROCEDURE — 36415 COLL VENOUS BLD VENIPUNCTURE: CPT | Performed by: STUDENT IN AN ORGANIZED HEALTH CARE EDUCATION/TRAINING PROGRAM

## 2023-10-10 PROCEDURE — 250N000011 HC RX IP 250 OP 636: Mod: JZ | Performed by: EMERGENCY MEDICINE

## 2023-10-10 PROCEDURE — 71046 X-RAY EXAM CHEST 2 VIEWS: CPT

## 2023-10-10 PROCEDURE — 36415 COLL VENOUS BLD VENIPUNCTURE: CPT | Performed by: EMERGENCY MEDICINE

## 2023-10-10 PROCEDURE — 93005 ELECTROCARDIOGRAM TRACING: CPT

## 2023-10-10 PROCEDURE — 82803 BLOOD GASES ANY COMBINATION: CPT

## 2023-10-10 PROCEDURE — 84132 ASSAY OF SERUM POTASSIUM: CPT | Performed by: EMERGENCY MEDICINE

## 2023-10-10 PROCEDURE — 85025 COMPLETE CBC W/AUTO DIFF WBC: CPT | Performed by: EMERGENCY MEDICINE

## 2023-10-10 PROCEDURE — 87637 SARSCOV2&INF A&B&RSV AMP PRB: CPT | Performed by: EMERGENCY MEDICINE

## 2023-10-10 PROCEDURE — 250N000009 HC RX 250: Performed by: EMERGENCY MEDICINE

## 2023-10-10 PROCEDURE — 84484 ASSAY OF TROPONIN QUANT: CPT | Performed by: EMERGENCY MEDICINE

## 2023-10-10 PROCEDURE — 250N000011 HC RX IP 250 OP 636: Mod: JZ | Performed by: STUDENT IN AN ORGANIZED HEALTH CARE EDUCATION/TRAINING PROGRAM

## 2023-10-10 PROCEDURE — 83880 ASSAY OF NATRIURETIC PEPTIDE: CPT | Performed by: EMERGENCY MEDICINE

## 2023-10-10 PROCEDURE — 84132 ASSAY OF SERUM POTASSIUM: CPT | Performed by: STUDENT IN AN ORGANIZED HEALTH CARE EDUCATION/TRAINING PROGRAM

## 2023-10-10 PROCEDURE — 93971 EXTREMITY STUDY: CPT | Mod: RT

## 2023-10-10 PROCEDURE — 85027 COMPLETE CBC AUTOMATED: CPT | Performed by: STUDENT IN AN ORGANIZED HEALTH CARE EDUCATION/TRAINING PROGRAM

## 2023-10-10 PROCEDURE — 84484 ASSAY OF TROPONIN QUANT: CPT | Performed by: STUDENT IN AN ORGANIZED HEALTH CARE EDUCATION/TRAINING PROGRAM

## 2023-10-10 PROCEDURE — 250N000013 HC RX MED GY IP 250 OP 250 PS 637: Performed by: STUDENT IN AN ORGANIZED HEALTH CARE EDUCATION/TRAINING PROGRAM

## 2023-10-10 RX ORDER — CLOPIDOGREL BISULFATE 75 MG/1
75 TABLET ORAL DAILY
Status: DISCONTINUED | OUTPATIENT
Start: 2023-10-11 | End: 2023-10-10

## 2023-10-10 RX ORDER — FLUTICASONE FUROATE AND VILANTEROL 100; 25 UG/1; UG/1
1 POWDER RESPIRATORY (INHALATION) DAILY
Status: DISCONTINUED | OUTPATIENT
Start: 2023-10-11 | End: 2023-10-13 | Stop reason: HOSPADM

## 2023-10-10 RX ORDER — CALCIUM CARBONATE 500 MG/1
500 TABLET, CHEWABLE ORAL 2 TIMES DAILY PRN
Status: DISCONTINUED | OUTPATIENT
Start: 2023-10-10 | End: 2023-10-13 | Stop reason: HOSPADM

## 2023-10-10 RX ORDER — ONDANSETRON 4 MG/1
4 TABLET, ORALLY DISINTEGRATING ORAL EVERY 6 HOURS PRN
Status: DISCONTINUED | OUTPATIENT
Start: 2023-10-10 | End: 2023-10-13 | Stop reason: HOSPADM

## 2023-10-10 RX ORDER — PANTOPRAZOLE SODIUM 20 MG/1
20 TABLET, DELAYED RELEASE ORAL DAILY
Status: DISCONTINUED | OUTPATIENT
Start: 2023-10-11 | End: 2023-10-13 | Stop reason: HOSPADM

## 2023-10-10 RX ORDER — FEXOFENADINE HCL 180 MG/1
180 TABLET ORAL EVERY EVENING
Status: DISCONTINUED | OUTPATIENT
Start: 2023-10-10 | End: 2023-10-13 | Stop reason: HOSPADM

## 2023-10-10 RX ORDER — DULOXETIN HYDROCHLORIDE 60 MG/1
60 CAPSULE, DELAYED RELEASE ORAL EVERY EVENING
Status: DISCONTINUED | OUTPATIENT
Start: 2023-10-11 | End: 2023-10-13 | Stop reason: HOSPADM

## 2023-10-10 RX ORDER — CALCIUM CARBONATE 500 MG/1
1 TABLET, CHEWABLE ORAL 2 TIMES DAILY PRN
COMMUNITY

## 2023-10-10 RX ORDER — METOPROLOL SUCCINATE 100 MG/1
100 TABLET, EXTENDED RELEASE ORAL EVERY EVENING
Status: DISCONTINUED | OUTPATIENT
Start: 2023-10-10 | End: 2023-10-11

## 2023-10-10 RX ORDER — ATORVASTATIN CALCIUM 20 MG/1
20 TABLET, FILM COATED ORAL EVERY EVENING
Status: DISCONTINUED | OUTPATIENT
Start: 2023-10-10 | End: 2023-10-13 | Stop reason: HOSPADM

## 2023-10-10 RX ORDER — NICOTINE POLACRILEX 4 MG
15-30 LOZENGE BUCCAL
Status: DISCONTINUED | OUTPATIENT
Start: 2023-10-10 | End: 2023-10-13 | Stop reason: HOSPADM

## 2023-10-10 RX ORDER — CLOPIDOGREL BISULFATE 75 MG/1
75 TABLET ORAL EVERY EVENING
Status: DISCONTINUED | OUTPATIENT
Start: 2023-10-10 | End: 2023-10-13 | Stop reason: HOSPADM

## 2023-10-10 RX ORDER — ONDANSETRON 2 MG/ML
4 INJECTION INTRAMUSCULAR; INTRAVENOUS EVERY 6 HOURS PRN
Status: DISCONTINUED | OUTPATIENT
Start: 2023-10-10 | End: 2023-10-13 | Stop reason: HOSPADM

## 2023-10-10 RX ORDER — ACETAMINOPHEN 500 MG
1000 TABLET ORAL 2 TIMES DAILY
Status: DISCONTINUED | OUTPATIENT
Start: 2023-10-11 | End: 2023-10-13 | Stop reason: HOSPADM

## 2023-10-10 RX ORDER — HYDRALAZINE HYDROCHLORIDE 25 MG/1
25 TABLET, FILM COATED ORAL 2 TIMES DAILY
Status: DISCONTINUED | OUTPATIENT
Start: 2023-10-10 | End: 2023-10-13 | Stop reason: HOSPADM

## 2023-10-10 RX ORDER — LEVALBUTEROL INHALATION SOLUTION 0.31 MG/3ML
0.31 SOLUTION RESPIRATORY (INHALATION) EVERY 4 HOURS PRN
Status: DISCONTINUED | OUTPATIENT
Start: 2023-10-10 | End: 2023-10-13 | Stop reason: HOSPADM

## 2023-10-10 RX ORDER — FLUTICASONE PROPIONATE 50 MCG
1 SPRAY, SUSPENSION (ML) NASAL DAILY PRN
Status: DISCONTINUED | OUTPATIENT
Start: 2023-10-10 | End: 2023-10-13 | Stop reason: HOSPADM

## 2023-10-10 RX ORDER — FUROSEMIDE 10 MG/ML
20 INJECTION INTRAMUSCULAR; INTRAVENOUS ONCE
Status: COMPLETED | OUTPATIENT
Start: 2023-10-10 | End: 2023-10-10

## 2023-10-10 RX ORDER — BUPROPION HYDROCHLORIDE 100 MG/1
100 TABLET, EXTENDED RELEASE ORAL EVERY EVENING
Status: DISCONTINUED | OUTPATIENT
Start: 2023-10-11 | End: 2023-10-13 | Stop reason: HOSPADM

## 2023-10-10 RX ORDER — HEPARIN SODIUM 10000 [USP'U]/100ML
0-5000 INJECTION, SOLUTION INTRAVENOUS CONTINUOUS
Status: DISCONTINUED | OUTPATIENT
Start: 2023-10-10 | End: 2023-10-11

## 2023-10-10 RX ORDER — IPRATROPIUM BROMIDE AND ALBUTEROL SULFATE 2.5; .5 MG/3ML; MG/3ML
3 SOLUTION RESPIRATORY (INHALATION) ONCE
Status: COMPLETED | OUTPATIENT
Start: 2023-10-10 | End: 2023-10-10

## 2023-10-10 RX ORDER — DEXTROSE MONOHYDRATE 25 G/50ML
25-50 INJECTION, SOLUTION INTRAVENOUS
Status: DISCONTINUED | OUTPATIENT
Start: 2023-10-10 | End: 2023-10-13 | Stop reason: HOSPADM

## 2023-10-10 RX ADMIN — HEPARIN SODIUM 1200 UNITS/HR: 10000 INJECTION, SOLUTION INTRAVENOUS at 21:56

## 2023-10-10 RX ADMIN — METOPROLOL SUCCINATE 100 MG: 100 TABLET, EXTENDED RELEASE ORAL at 22:04

## 2023-10-10 RX ADMIN — Medication 1 MG: at 21:55

## 2023-10-10 RX ADMIN — HYDRALAZINE HYDROCHLORIDE 25 MG: 25 TABLET ORAL at 22:06

## 2023-10-10 RX ADMIN — IPRATROPIUM BROMIDE AND ALBUTEROL SULFATE 3 ML: .5; 3 SOLUTION RESPIRATORY (INHALATION) at 14:37

## 2023-10-10 RX ADMIN — CLOPIDOGREL BISULFATE 75 MG: 75 TABLET ORAL at 23:13

## 2023-10-10 RX ADMIN — FEXOFENADINE HCL 180 MG: 180 TABLET ORAL at 22:04

## 2023-10-10 RX ADMIN — FUROSEMIDE 20 MG: 10 INJECTION, SOLUTION INTRAMUSCULAR; INTRAVENOUS at 18:49

## 2023-10-10 RX ADMIN — ATORVASTATIN CALCIUM 20 MG: 20 TABLET, FILM COATED ORAL at 22:00

## 2023-10-10 ASSESSMENT — ACTIVITIES OF DAILY LIVING (ADL)
ADLS_ACUITY_SCORE: 37

## 2023-10-10 NOTE — ED NOTES
"Tele-PIT/Intake Evaluation      Video-Visit Details    Type of service:  Video Visit    Video Start Time (time video started): 2:18 PM  Video End Time (time video stopped): 2:22 PM   Originating Location (pt. Location):  [facility]  Distant Location (provider location):  [facility]  Mode of Communication:  Video Conference via [employer]  Patient verbally consented to [employer] televisit.    History:  Patient presents with shortness of breath.  States any activity makes her very short of breath.  Symptoms since Sunday.  No chest pain.  Patient with history of asthma.  Using her inhalers without relief.  No fever or chills.  Patient has a cough with a \"lot of phelgm\".  Took cough syrup this morning.  No vomiting or diarrhea.  No history of PE or DVT.  Right leg is swollen.  No smoking. No recent steroids per the patient.  No oxygen at home.      Exam:  General:  Alert, interactive  Cardiovascular:  Well perfused  Lungs:  No respiratory distress, no accessory muscle use  Neuro:  Moving all 4 extremities  Skin:  Warm, dry  Psych:  Normal affect    Patient Vitals for the past 24 hrs:   BP Temp Temp src Pulse Resp SpO2   10/10/23 1405 (!) 142/73 97.1  F (36.2  C) Temporal 114 24 92 %       Appropriate interventions for symptom management were initiated if applicable.  Appropriate diagnostic tests were initiated if indicated.    Important information for subsequent clinician:  Patient with 2 days of increasing shortness of breath and dyspnea on exertion.  Denies fever or chills.  Has a history of asthma but usually improves immediately with her inhalers.  Denies ever needing admission or any recent steroids for asthma in the past.  She denies chest pain or fever or cough.  No sick contacts.  She does endorse right leg swelling as well but no history of PE or DVT.  Will order ultrasound of her right lower extremity, EKG and chest x-ray as well as labs and a DuoNeb while awaiting in person evaluation.    I " briefly evaluated the patient and developed an initial plan of care. I discussed this plan and explained that this brief interaction does not constitute a full evaluation. Patient/family understands that they should wait to be fully evaluated and discuss any test results with another clinician prior to leaving the hospital.       Mel Samano MD  10/10/23 [id number]

## 2023-10-10 NOTE — PROGRESS NOTES
SUBJECTIVE: Moira Andre is a 89 year old female presenting with a chief complaint of SOB.  Onset of symptoms was 2 day(s) ago.  Course of illness is same.    Severity moderately severe  Current and Associated symptoms: none    Past Medical History:   Diagnosis Date    Aortic valve sclerosis     heart murmur, no AS    Arrhythmia     PAT, PVC    Aspirin allergy     Plavix use long term    Asthma     CKD (chronic kidney disease) stage 3, GFR 30-59 ml/min (H)     x 2007 atleast    Congestive heart failure, unspecified     Depression     Diabetes mellitus (H)     Diastolic dysfunction, left ventricle     grade 2, nl ef    HTN (hypertension)     Lactic acidosis 2018    due to dehydration and metformin    Migraine headache     Mitral stenosis     mild, 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     Lisinopril Cough    Losartan      Hyperkalemia      Metformin      Elevated lactic acid    Minocycline      Yellow Dye Allergy. Minocycline has Yellow Dye #10.    Mounjaro [Tirzepatide] Diarrhea and GI Disturbance    Salicylates Hives    Yellow Dye Hives     Rxn to yellow tablet. Eyes swelled shut.     Yellow Dyes (Non-Tartrazine) Hives     Social History     Tobacco Use    Smoking status: Former     Packs/day: 0.25     Years: 1.00     Pack years: 0.25     Types: Cigarettes     Start date:      Quit date: 1973     Years since quittin.4    Smokeless tobacco: Never   Substance Use Topics    Alcohol use: Yes     Alcohol/week: 0.0 -  1.0 standard drinks of alcohol     Comment: rarely       ROS:  SKIN: no rash  GI: no vomiting    OBJECTIVE:  BP (!) 162/94   Pulse 120   Temp 97.4  F (36.3  C) (Tympanic)   Wt 126.1 kg (278 lb)   SpO2 91%   BMI 46.26 kg/m  GENERAL APPEARANCE: healthy, alert and no distress  EYES: EOMI,  PERRL, conjunctiva clear  HENT: ear canals and TM's normal.  Nose and mouth without ulcers, erythema or lesions  RESP: lungs clear to auscultation - no rales, rhonchi or wheezes  CV: regular rates and rhythm, normal S1 S2, no murmur noted  SKIN: no suspicious lesions or rashes      ICD-10-CM    1. SOB (shortness of breath)  R06.02 CANCELED: EKG 12-lead complete w/read - Clinics      2. History of asthma  Z87.09       3. Low O2 saturation  R79.81       4. Weight gain  R63.5         Pt will go through ED for w/u, declines EKG/CXR at

## 2023-10-10 NOTE — PHARMACY-ADMISSION MEDICATION HISTORY
Pharmacist Admission Medication History    Admission medication history is complete. The information provided in this note is only as accurate as the sources available at the time of the update.    Information Source(s): Patient and CareEverywhere/SureScripts via in-person    Pertinent Information: stopped glimepiride 1 month ago and started on sample supply of semaglutide (Rybelsus). Has run out of Rybelsus as of 3-4 days ago and is now planning on resuming glimepiride due to insurance coverage reasons. Has not yet contacted her primary about this yet.      Changes made to PTA medication list:  Added: None  Deleted: None  Changed: None       Allergies reviewed with patient and updates made in EHR: no    Medication History Completed By: Chanda Lopez [facility] 10/10/2023 4:45 PM    PTA Med List   Medication Sig Last Dose    acetaminophen (TYLENOL) 500 MG tablet Take 1,000 mg by mouth 2 times daily 10/10/2023 at am    albuterol (PROAIR HFA/PROVENTIL HFA/VENTOLIN HFA) 108 (90 Base) MCG/ACT inhaler INHALE 2 PUFFS INTO THE LUNGS EVERY 6 HOURS FOR SHORTNESS OF BREATH  at prn    atorvastatin (LIPITOR) 20 MG tablet TAKE 1 TABLET BY MOUTH DAILY FOR CHOLESTEROL 10/9/2023 at pm    buPROPion (WELLBUTRIN SR) 100 MG 12 hr tablet Take 1 tablet (100 mg) by mouth daily for mood 10/9/2023 at pm    calcium carbonate (TUMS) 500 MG chewable tablet Take 1 chew tab by mouth 2 times daily as needed for heartburn  at prn    camphor-menthol-methyl salicylate 3.1-6-10 % PTCH Externally apply 1 patch topically as needed (hand pain)  at prn    clopidogrel (PLAVIX) 75 MG tablet TAKE 1 TABLET BY MOUTH DAILY 10/9/2023 at pm    DULoxetine (CYMBALTA) 60 MG capsule Take 1 capsule (60 mg) by mouth daily 10/9/2023 at pm    fexofenadine (ALLEGRA) 180 MG tablet Take 180 mg by mouth every evening 10/9/2023 at pm    fluticasone (FLONASE) 50 MCG/ACT nasal spray Spray 1 spray into both nostrils daily as needed   at prn    fluticasone-salmeterol (WIXELA INHUB)  100-50 MCG/ACT inhaler USE 1 INHALATION BY MOUTH EVERY  12 HOURS (Patient taking differently: 1 puff daily USE 1 INHALATION BY MOUTH EVERY  12 HOURS) 10/10/2023 at am    hydrALAZINE (APRESOLINE) 25 MG tablet TAKE 1 TABLET BY MOUTH TWICE  DAILY FOR BLOOD PRESSURE 10/10/2023 at am    IBANdronate (BONIVA) 150 MG tablet TAKE 1 TABLET BY MOUTH  EVERY 30 DAYS FOR  OSTEOPENIA 10/1/2023    Menthol (Topical Analgesic) 7.5 % (Roll) MISC Apply to affected area every morning  at prn    metoprolol succinate ER (TOPROL XL) 100 MG 24 hr tablet TAKE 1 TABLET BY MOUTH  DAILY 10/9/2023 at pm    Multiple Vitamins-Minerals (HAIR SKIN AND NAILS FORMULA PO) Take 1 tablet by mouth daily 10/9/2023 at pm    nitroGLYcerin (NITROSTAT) 0.4 MG sublingual tablet FOR CHEST PAIN PLACE 1 TABLET  UNDER TONGUE EVERY 5 MINUTES FOR 3 DOSES. IF SYMPTOMS PERSIST 5  MINUTES AFTER 1ST DOSE CALL 911  at prn    pantoprazole (PROTONIX) 20 MG EC tablet Take 1 tablet (20 mg) by mouth daily 10/10/2023 at am    triamcinolone (KENALOG) 0.1 % external cream Apply topically 2 times daily (Patient taking differently: Apply topically as needed for irritation)  at prn    Vitamin D3 (CHOLECALCIFEROL) 25 mcg (1000 units) tablet Take 100 Units by mouth daily 10/9/2023 at pm

## 2023-10-10 NOTE — ED TRIAGE NOTES
Patient reports shortness  of breath since Sunday. It improved over the last few days. Patient reports worsening dyspnea on exertion. Patient is taking plavix.

## 2023-10-10 NOTE — ED NOTES
"[facility]  ED Nurse Handoff Report    ED Chief complaint: Shortness of Breath      ED Diagnosis:   Final diagnoses:   None       Code Status: not addressed at this time    Allergies:   Allergies   Allergen Reactions    Aspirin Hives     Reaction occurred during childhood.     Lisinopril Cough    Losartan      Hyperkalemia      Metformin      Elevated lactic acid    Minocycline      Yellow Dye Allergy. Minocycline has Yellow Dye #10.    Mounjaro [Tirzepatide] Diarrhea and GI Disturbance    Salicylates Hives    Yellow Dye Hives     Rxn to yellow tablet. Eyes swelled shut.     Yellow Dyes (Non-Tartrazine) Hives       Patient Story: Patient presents with shortness of breath. States any activity makes her very short of breath. Symptoms since Sunday. No chest pain. Patient with history of asthma. Using her inhalers without relief. No fever or chills. Patient has a cough with a \"lot of phelgm\". Took cough syrup this morning. No vomiting or diarrhea. No history of PE or DVT. Right leg is swollen. No smoking. No recent steroids per the patient. No oxygen at home.   Focused Assessment:  patient c/o SOB that started on Sunday. Patient has also had a cough with phelgm. Patient oxygen dropped to 85% when transferring from wheelchair to bed. Patient was placed on 3L oxygen.     Treatments and/or interventions provided: labs, EKG, US, COVID test, oxygen    Patient's response to treatments and/or interventions: Decrease in SOB    To be done/followed up on inpatient unit:  continue to monitor    Does this patient have any cognitive concerns?:  A/OX4    Activity level - Baseline/Home:  Unknown  Activity Level - Current:   Unknown    Patient's Preferred language: English   Needed?: No    Isolation: None  Infection: Not Applicable  Patient tested for COVID 19 prior to admission: YES  Bariatric?: No    Vital Signs:   Vitals:    10/10/23 1405 10/10/23 1530   BP: (!) 142/73 (!) 138/106   Pulse: 114 87   Resp: " 24 18   Temp: 97.1  F (36.2  C)    TempSrc: Temporal    SpO2: 92% 99%       Cardiac Rhythm:     Was the PSS-3 completed:   Yes  What interventions are required if any?               Family Comments: NA  OBS brochure/video discussed/provided to patient/family: N/A              Name of person given brochure if not patient: NA              Relationship to patient: NA    For the majority of the shift this patient's behavior was Green.   Behavioral interventions performed were None.    ED NURSE PHONE NUMBER: [phone]

## 2023-10-10 NOTE — H&P
[facility]    History and Physical - Hospitalist Service       Date of Admission:  10/10/2023    Assessment & Plan      Moira Andre is a 89 year old female admitted on 10/10/2023. She history of type 2 diabetes mellitus with diabetic nephropathy , chronic kidney disease stage III spinal stenosis of the lumbosacral region, depression, ventral hernia obstructive apnea on CPAP, asthma, hypertension,      #Acute hypoxic respiratory failure  #Atrial fibrillation with RVR   likely secondary to volume overload secondary to atrial fibrillation with RVR  -Chest x-ray consistent with volume overload  -Patient is a [id number] of 5 and she is okay to start anticoagulation.  She did not have any falls in the last 1 year.  Discussed with patient the risk and benefit of starting anticoagulation the benefit being stroke prevention and the risk of bleeding.  Patient aware of the risk and is okay to start anticoagulation  -Previous echocardiogram done 2020 shows normal ejection fraction 60-65 %  -We will start her on IV heparin and transition her to DOAC  -Patient has been on Plavix for coronary artery disease and will also consult cardiology to see if he can stop Plavix as she is going to be on anticoagulant   -continue metoprolol for rate control  -will repeat echocardiogram  -If her heart rates are not controlled then would  started on Cardizem drip  -Lasix 20 mg further diuresis based on volume status  -Strict output  -Daily weights    #Mild asthma exacerbation  -Likely precipitated by an upper respiratory tract infection viral.  COVID-19 influenza negative  -Would use low-dose Xopenex nebs  -Continue home inhalers  -Hold steroids at this point and start steroids if her symptoms worsen          # CAD with h/o stenting ~2006  Valvular heart disease (aortic stenosis, mitral stenosis)  H/o diastolic CHF  Hyperlipidemia  Hypertension  -Patient had stenting 30 years ago.  Coronary angiogram in 2017 with  disease but no intervention was done.  -Continue plavix  -Continue PTA metoprolol  -Continue PTA statin    #CKD stage III  -Baseline creatinine between 1.4-1.6  -Creatinine at baseline  -Monitor creatinine as she is being diuresed with IV Lasix  -Outpatient follow-up with nephrology  -Avoid nephrotoxic medications      # DM2   -POC ACHS  -Hypoglycemia protocol  -LDSSI      # HTN  -Continue PTA hydralazine 25mg bid  -Continue PTA metoprolol 100mg daily    #Elevated troponins  -Likely due to demand ischemia due to atrial fibrillation      #Mild hyperkalemia  -Check bladder scan for retention  -Her admission potassium was 5.8 which came down to 5.6  -Lasix 20 mg IV repeat potassium    # ELIGIO  -Home CPAP       Diet: Low Saturated Fat Na <2400 mg  DVT Prophylaxis: Heparin GTT  Charles Catheter: Not present  Lines: None     Cardiac Monitoring: None  Code Status: Full Code    Clinically Significant Risk Factors Present on Admission        # Hyperkalemia: Highest K = 5.8 mmol/L in last 2 days, will monitor as appropriate           # Drug Induced Platelet Defect: home medication list includes an antiplatelet medication     # Hypertension: Noted on problem list     # DMII: A1C = N/A within past 6 months        # Asthma: noted on problem list        Disposition Plan      Expected Discharge Date: 10/12/2023                  Bam Kaur MD  Hospitalist Service  [facility]  Securely message with [employer] (more info)  Text page via [email or web address] Paging/Directory     ______________________________________________________________________    Chief Complaint   Shortness of breath    History is obtained from the patient    History of Present Illness   Moira Andre is a 89 year old female who bernice Andre is a 89 year old female admitted on 10/10/2023. She history of type 2 diabetes mellitus with diabetic nephropathy , chronic kidney disease stage III spinal stenosis of the lumbosacral region, depression,  ventral hernia obstructive apnea on CPAP, asthma, hypertension,    Patient presenting with evaluation of shortness of breath.  Does not notice any palpitations.  She does not have any history of atrial fibrillation as per the patient.  Denies any chest pain.  Having some mild shortness of breath.  She was seen in the primary care clinic where her heart rate was in the 120s.  She also had low oxygen saturations.     Past Medical History    Past Medical History:   Diagnosis Date    Aortic valve sclerosis     heart murmur, no AS    Arrhythmia     PAT, PVC    Aspirin allergy     Plavix use long term    Asthma     CKD (chronic kidney disease) stage 3, GFR 30-59 ml/min (H)     x 2007 atleast    Congestive heart failure, unspecified     Depression     Diabetes mellitus (H) 2010    Diastolic dysfunction, left ventricle 2013    grade 2, nl ef    HTN (hypertension)     Lactic acidosis 08/2018    due to dehydration and metformin    Migraine headache     Mitral stenosis     mild, 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    Myocardial infarction (H) 9/2007, cath 2013 ml    BMS: stent to OM, diag, nl EF, echo /C angia 2013 , f/u cath no lesion >40%    Nephrolithiasis     right side    OA (osteoarthritis) of knee     Obesity     Rheumatoid arthritis flare (H)     prednisone    Sleep apnea     restarted using cpap 2017    TIA (transient ischaemic attack)     Ventral hernia, unspecified, without mention of obstruction or gangrene        Past Surgical History   Past Surgical History:   Procedure Laterality Date    APPENDECTOMY      BIOPSY BREAST      x2 -needle & lumpectomy-benign    CHOLECYSTECTOMY      CORONARY ANGIOGRAPHY ADULT ORDER  9/28/2007    Bare metal stent to OM1, Diagonal patent     CORONARY ANGIOGRAPHY ADULT ORDER  9/25/2007    [place] stent to Diagonal    HC LEFT HEART CATHETERIZATION  8/2013    Moderate CAD    HYSTERECTOMY TOTAL ABDOMINAL      ORTHOPEDIC SURGERY      knee replacement on right side (2006), Left side (2016)    RELEASE CARPAL  TUNNEL      right and left    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 Taking?   DULoxetine (CYMBALTA) 60 MG capsule 10/9/2023 at pm  No Yes   Sig: Take 1 capsule (60 mg) by mouth daily   IBANdronate (BONIVA) 150 MG tablet 10/1/2023  No Yes   Sig: TAKE 1 TABLET BY MOUTH  EVERY 30 DAYS FOR  OSTEOPENIA   Menthol (Topical Analgesic) 7.5 % (Roll) MISC  at prn Self Yes Yes   Sig: Apply to affected area every morning   Multiple Vitamins-Minerals (HAIR SKIN AND NAILS FORMULA PO) 10/9/2023 at pm  Yes Yes   Sig: Take 1 tablet by mouth daily   Semaglutide (RYBELSUS) 7 MG tablet   No No   Sig: Take 1 tablet (7 mg) by mouth daily   Patient not taking: Reported on 10/10/2023   Vitamin D3 (CHOLECALCIFEROL) 25 mcg (1000 units) tablet 10/9/2023 at pm Self Yes Yes   Sig: Take 100 Units by mouth daily   acetaminophen (TYLENOL) 500 MG tablet 10/10/2023 at am  Yes Yes   Sig: Take 1,000 mg by mouth 2 times daily   albuterol (PROAIR HFA/PROVENTIL HFA/VENTOLIN HFA) 108 (90 Base) MCG/ACT inhaler  at prn  No Yes   Sig: INHALE 2 PUFFS INTO THE LUNGS EVERY 6 HOURS FOR SHORTNESS OF BREATH   atorvastatin (LIPITOR) 20 MG tablet 10/9/2023 at pm  No Yes   Sig: TAKE 1 TABLET BY MOUTH DAILY FOR CHOLESTEROL   buPROPion (WELLBUTRIN SR) 100 MG 12 hr tablet 10/9/2023 at pm  No Yes   Sig: Take 1 tablet (100 mg) by mouth daily for mood   calcium carbonate (TUMS) 500 MG chewable tablet  at prn  Yes Yes   Sig: Take 1 chew tab by mouth 2 times daily as needed for heartburn   camphor-menthol-methyl salicylate 3.1-6-10 % PTCH  at prn  Yes Yes   Sig: Externally apply 1 patch topically as needed (hand pain)   clopidogrel (PLAVIX) 75 MG tablet 10/9/2023 at pm  No Yes   Sig: TAKE 1 TABLET BY MOUTH DAILY   fexofenadine (ALLEGRA) 180 MG tablet 10/9/2023 at pm  Yes Yes   Sig: Take 180 mg by mouth every evening   fluticasone  (FLONASE) 50 MCG/ACT nasal spray  at prn Self Yes Yes   Sig: Spray 1 spray into both nostrils daily as needed    fluticasone-salmeterol (WIXELA INHUB) 100-50 MCG/ACT inhaler 10/10/2023 at am  No Yes   Sig: USE 1 INHALATION BY MOUTH EVERY  12 HOURS   Patient taking differently: 1 puff daily USE 1 INHALATION BY MOUTH EVERY  12 HOURS   glimepiride (AMARYL) 2 MG tablet   No No   Sig: TAKE 1 TABLET BY MOUTH IN  THE MORNING BEFORE  BREAKFAST FOR DIABETES   hydrALAZINE (APRESOLINE) 25 MG tablet 10/10/2023 at am  No Yes   Sig: TAKE 1 TABLET BY MOUTH TWICE  DAILY FOR BLOOD PRESSURE   metoprolol succinate ER (TOPROL XL) 100 MG 24 hr tablet 10/9/2023 at pm  No Yes   Sig: TAKE 1 TABLET BY MOUTH  DAILY   nitroGLYcerin (NITROSTAT) 0.4 MG sublingual tablet  at prn  No Yes   Sig: FOR CHEST PAIN PLACE 1 TABLET  UNDER TONGUE EVERY 5 MINUTES FOR 3 DOSES. IF SYMPTOMS PERSIST 5  MINUTES AFTER 1ST DOSE CALL 911   pantoprazole (PROTONIX) 20 MG EC tablet 10/10/2023 at am  No Yes   Sig: Take 1 tablet (20 mg) by mouth daily   triamcinolone (KENALOG) 0.1 % external cream  at prn  No Yes   Sig: Apply topically 2 times daily   Patient taking differently: Apply topically as needed for irritation      Facility-Administered Medications: None           Physical Exam   Vital Signs: Temp: 97.1  F (36.2  C) Temp src: Temporal BP: (!) 145/111 Pulse: 105   Resp: 25 SpO2: 95 % O2 Device: None (Room air)    Weight: 0 lbs 0 oz    Physical Exam  Cardiovascular:      Rate and Rhythm: Tachycardia present.      Pulses: Normal pulses.   Pulmonary:      Effort: Pulmonary effort is normal. No respiratory distress.      Breath sounds: Rales present.   Abdominal:      General: There is no distension.      Palpations: Abdomen is soft.      Tenderness: There is no abdominal tenderness.          Medical Decision Making             Data     I have personally reviewed the following data over the past 24 hrs:    11.5 (H)  \   12.0   / 291     135 102 33.2 (H) /  189 (H)    5.6 (H) 18 (L) 1.42 (H) \     ALT: N/A AST: N/A AP: N/A TBILI: 0.4   ALB: 3.7 TOT PROTEIN: 7.2 LIPASE: N/A     Trop: 27 (H) BNP: 4,629 (H)     Procal: 0.05 (H) CRP: N/A Lactic Acid: 1.6         Imaging results reviewed over the past 24 hrs:   Recent Results (from the past 24 hour(s))   XR Chest 2 Views    Narrative    CHEST TWO VIEWS  10/10/2023 2:56 PM       INDICATION: Shortness of breath.    COMPARISON: 4/19/2023       Impression    IMPRESSION: New trace bilateral pleural effusions. Cardiomegaly with  pulmonary vascular congestion. Increased interstitial markings likely  represent mild interstitial edema. No evidence of pneumonia.    KASSANDRA SCHILLING MD         SYSTEM ID:  [id number]   US Lower Extremity Venous Duplex Right    Narrative    VENOUS ULTRASOUND RIGHT LEG  10/10/2023 3:19 PM     HISTORY: leg swelling    COMPARISON: None.    FINDINGS:  Examination of the deep veins with graded compression and  color flow Doppler with spectral wave form analysis was performed.   There is no evidence for DVT in the right lower extremity. Only one of  the paired peroneal veins is visualized.      Impression    IMPRESSION: No evidence of deep venous thrombosis.    DAE HONG MD         SYSTEM ID:  [id number]

## 2023-10-11 ENCOUNTER — APPOINTMENT (OUTPATIENT)
Dept: PHYSICAL THERAPY | Facility: CLINIC | Age: 88
DRG: 291 | End: 2023-10-11
Attending: HOSPITALIST
Payer: COMMERCIAL

## 2023-10-11 LAB
ALBUMIN SERPL BCG-MCNC: 3.6 G/DL (ref 3.5–5.2)
ALP SERPL-CCNC: 88 U/L (ref 35–104)
ALT SERPL W P-5'-P-CCNC: 26 U/L (ref 0–50)
ANION GAP SERPL CALCULATED.3IONS-SCNC: 12 MMOL/L (ref 7–15)
AST SERPL W P-5'-P-CCNC: 19 U/L (ref 0–45)
BILIRUB DIRECT SERPL-MCNC: <0.2 MG/DL (ref 0–0.3)
BILIRUB SERPL-MCNC: 0.3 MG/DL
BUN SERPL-MCNC: 32 MG/DL (ref 8–23)
CALCIUM SERPL-MCNC: 9.4 MG/DL (ref 8.2–9.6)
CHLORIDE SERPL-SCNC: 102 MMOL/L (ref 98–107)
CREAT SERPL-MCNC: 1.59 MG/DL (ref 0.51–0.95)
DEPRECATED HCO3 PLAS-SCNC: 22 MMOL/L (ref 22–29)
EGFRCR SERPLBLD CKD-EPI 2021: 31 ML/MIN/1.73M2
ERYTHROCYTE [DISTWIDTH] IN BLOOD BY AUTOMATED COUNT: 16.6 % (ref 10–15)
GLUCOSE BLDC GLUCOMTR-MCNC: 141 MG/DL (ref 70–99)
GLUCOSE BLDC GLUCOMTR-MCNC: 143 MG/DL (ref 70–99)
GLUCOSE BLDC GLUCOMTR-MCNC: 158 MG/DL (ref 70–99)
GLUCOSE BLDC GLUCOMTR-MCNC: 180 MG/DL (ref 70–99)
GLUCOSE BLDC GLUCOMTR-MCNC: 192 MG/DL (ref 70–99)
GLUCOSE SERPL-MCNC: 175 MG/DL (ref 70–99)
HCT VFR BLD AUTO: 38.2 % (ref 35–47)
HGB BLD-MCNC: 11.7 G/DL (ref 11.7–15.7)
LACTATE SERPL-SCNC: 1.4 MMOL/L (ref 0.7–2)
MAGNESIUM SERPL-MCNC: 1.8 MG/DL (ref 1.7–2.3)
MCH RBC QN AUTO: 25.2 PG (ref 26.5–33)
MCHC RBC AUTO-ENTMCNC: 30.6 G/DL (ref 31.5–36.5)
MCV RBC AUTO: 82 FL (ref 78–100)
PLATELET # BLD AUTO: 298 10E3/UL (ref 150–450)
POTASSIUM SERPL-SCNC: 5 MMOL/L (ref 3.4–5.3)
PROT SERPL-MCNC: 6.7 G/DL (ref 6.4–8.3)
RBC # BLD AUTO: 4.65 10E6/UL (ref 3.8–5.2)
SODIUM SERPL-SCNC: 136 MMOL/L (ref 135–145)
TSH SERPL DL<=0.005 MIU/L-ACNC: 1.65 UIU/ML (ref 0.3–4.2)
UFH PPP CHRO-ACNC: 0.14 IU/ML
UFH PPP CHRO-ACNC: 0.55 IU/ML
WBC # BLD AUTO: 9.9 10E3/UL (ref 4–11)

## 2023-10-11 PROCEDURE — 36415 COLL VENOUS BLD VENIPUNCTURE: CPT | Performed by: HOSPITALIST

## 2023-10-11 PROCEDURE — 80053 COMPREHEN METABOLIC PANEL: CPT | Performed by: STUDENT IN AN ORGANIZED HEALTH CARE EDUCATION/TRAINING PROGRAM

## 2023-10-11 PROCEDURE — 99223 1ST HOSP IP/OBS HIGH 75: CPT | Mod: FS | Performed by: NURSE PRACTITIONER

## 2023-10-11 PROCEDURE — 83735 ASSAY OF MAGNESIUM: CPT | Performed by: HOSPITALIST

## 2023-10-11 PROCEDURE — 99233 SBSQ HOSP IP/OBS HIGH 50: CPT | Performed by: HOSPITALIST

## 2023-10-11 PROCEDURE — 85027 COMPLETE CBC AUTOMATED: CPT | Performed by: STUDENT IN AN ORGANIZED HEALTH CARE EDUCATION/TRAINING PROGRAM

## 2023-10-11 PROCEDURE — 97530 THERAPEUTIC ACTIVITIES: CPT | Mod: GP

## 2023-10-11 PROCEDURE — 250N000011 HC RX IP 250 OP 636: Mod: JZ | Performed by: HOSPITALIST

## 2023-10-11 PROCEDURE — 84443 ASSAY THYROID STIM HORMONE: CPT | Performed by: HOSPITALIST

## 2023-10-11 PROCEDURE — 85520 HEPARIN ASSAY: CPT | Performed by: STUDENT IN AN ORGANIZED HEALTH CARE EDUCATION/TRAINING PROGRAM

## 2023-10-11 PROCEDURE — 85520 HEPARIN ASSAY: CPT | Performed by: HOSPITALIST

## 2023-10-11 PROCEDURE — 210N000002 HC R&B HEART CARE

## 2023-10-11 PROCEDURE — 250N000013 HC RX MED GY IP 250 OP 250 PS 637: Performed by: NURSE PRACTITIONER

## 2023-10-11 PROCEDURE — 250N000013 HC RX MED GY IP 250 OP 250 PS 637: Performed by: HOSPITALIST

## 2023-10-11 PROCEDURE — 250N000012 HC RX MED GY IP 250 OP 636 PS 637: Performed by: STUDENT IN AN ORGANIZED HEALTH CARE EDUCATION/TRAINING PROGRAM

## 2023-10-11 PROCEDURE — 82248 BILIRUBIN DIRECT: CPT | Performed by: STUDENT IN AN ORGANIZED HEALTH CARE EDUCATION/TRAINING PROGRAM

## 2023-10-11 PROCEDURE — 97116 GAIT TRAINING THERAPY: CPT | Mod: GP

## 2023-10-11 PROCEDURE — 250N000011 HC RX IP 250 OP 636: Mod: JZ | Performed by: STUDENT IN AN ORGANIZED HEALTH CARE EDUCATION/TRAINING PROGRAM

## 2023-10-11 PROCEDURE — 250N000013 HC RX MED GY IP 250 OP 250 PS 637: Performed by: EMERGENCY MEDICINE

## 2023-10-11 PROCEDURE — 83605 ASSAY OF LACTIC ACID: CPT | Performed by: HOSPITALIST

## 2023-10-11 PROCEDURE — 36415 COLL VENOUS BLD VENIPUNCTURE: CPT | Performed by: STUDENT IN AN ORGANIZED HEALTH CARE EDUCATION/TRAINING PROGRAM

## 2023-10-11 PROCEDURE — 97161 PT EVAL LOW COMPLEX 20 MIN: CPT | Mod: GP

## 2023-10-11 PROCEDURE — 250N000013 HC RX MED GY IP 250 OP 250 PS 637: Performed by: STUDENT IN AN ORGANIZED HEALTH CARE EDUCATION/TRAINING PROGRAM

## 2023-10-11 RX ORDER — FUROSEMIDE 10 MG/ML
20 INJECTION INTRAMUSCULAR; INTRAVENOUS ONCE
Status: COMPLETED | OUTPATIENT
Start: 2023-10-11 | End: 2023-10-11

## 2023-10-11 RX ORDER — METOPROLOL SUCCINATE 100 MG/1
100 TABLET, EXTENDED RELEASE ORAL 2 TIMES DAILY
Status: DISCONTINUED | OUTPATIENT
Start: 2023-10-11 | End: 2023-10-13 | Stop reason: HOSPADM

## 2023-10-11 RX ORDER — FUROSEMIDE 10 MG/ML
40 INJECTION INTRAMUSCULAR; INTRAVENOUS ONCE
Status: COMPLETED | OUTPATIENT
Start: 2023-10-12 | End: 2023-10-12

## 2023-10-11 RX ADMIN — ACETAMINOPHEN 1000 MG: 500 TABLET, FILM COATED ORAL at 00:41

## 2023-10-11 RX ADMIN — FUROSEMIDE 20 MG: 10 INJECTION, SOLUTION INTRAMUSCULAR; INTRAVENOUS at 13:01

## 2023-10-11 RX ADMIN — BUPROPION HYDROCHLORIDE 100 MG: 100 TABLET, FILM COATED, EXTENDED RELEASE ORAL at 01:27

## 2023-10-11 RX ADMIN — HYDRALAZINE HYDROCHLORIDE 25 MG: 25 TABLET ORAL at 07:48

## 2023-10-11 RX ADMIN — ATORVASTATIN CALCIUM 20 MG: 20 TABLET, FILM COATED ORAL at 20:15

## 2023-10-11 RX ADMIN — ACETAMINOPHEN 1000 MG: 500 TABLET, FILM COATED ORAL at 07:49

## 2023-10-11 RX ADMIN — DULOXETINE HYDROCHLORIDE 60 MG: 60 CAPSULE, DELAYED RELEASE ORAL at 00:41

## 2023-10-11 RX ADMIN — METOPROLOL SUCCINATE 100 MG: 100 TABLET, EXTENDED RELEASE ORAL at 20:15

## 2023-10-11 RX ADMIN — INSULIN ASPART 1 UNITS: 100 INJECTION, SOLUTION INTRAVENOUS; SUBCUTANEOUS at 17:50

## 2023-10-11 RX ADMIN — CLOPIDOGREL BISULFATE 75 MG: 75 TABLET ORAL at 20:15

## 2023-10-11 RX ADMIN — DULOXETINE HYDROCHLORIDE 60 MG: 60 CAPSULE, DELAYED RELEASE ORAL at 20:15

## 2023-10-11 RX ADMIN — BUPROPION HYDROCHLORIDE 100 MG: 100 TABLET, FILM COATED, EXTENDED RELEASE ORAL at 20:15

## 2023-10-11 RX ADMIN — INSULIN ASPART 1 UNITS: 100 INJECTION, SOLUTION INTRAVENOUS; SUBCUTANEOUS at 13:04

## 2023-10-11 RX ADMIN — ACETAMINOPHEN 1000 MG: 500 TABLET, FILM COATED ORAL at 20:15

## 2023-10-11 RX ADMIN — PANTOPRAZOLE SODIUM 20 MG: 20 TABLET, DELAYED RELEASE ORAL at 07:48

## 2023-10-11 RX ADMIN — FLUTICASONE PROPIONATE 1 SPRAY: 50 SPRAY, METERED NASAL at 15:36

## 2023-10-11 RX ADMIN — APIXABAN 2.5 MG: 2.5 TABLET, FILM COATED ORAL at 17:52

## 2023-10-11 RX ADMIN — FLUTICASONE FUROATE AND VILANTEROL TRIFENATATE 1 PUFF: 100; 25 POWDER RESPIRATORY (INHALATION) at 07:48

## 2023-10-11 RX ADMIN — FEXOFENADINE HCL 180 MG: 180 TABLET ORAL at 20:15

## 2023-10-11 RX ADMIN — Medication 1 SPRAY: at 15:36

## 2023-10-11 RX ADMIN — HEPARIN SODIUM 1150 UNITS/HR: 10000 INJECTION, SOLUTION INTRAVENOUS at 13:00

## 2023-10-11 RX ADMIN — HYDRALAZINE HYDROCHLORIDE 25 MG: 25 TABLET ORAL at 20:15

## 2023-10-11 ASSESSMENT — ACTIVITIES OF DAILY LIVING (ADL)
ADLS_ACUITY_SCORE: 32
ADLS_ACUITY_SCORE: 32
ADLS_ACUITY_SCORE: 24
ADLS_ACUITY_SCORE: 34
ADLS_ACUITY_SCORE: 34
ADLS_ACUITY_SCORE: 24
ADLS_ACUITY_SCORE: 34
ADLS_ACUITY_SCORE: 24
ADLS_ACUITY_SCORE: 32
ADLS_ACUITY_SCORE: 28
ADLS_ACUITY_SCORE: 32
ADLS_ACUITY_SCORE: 34

## 2023-10-11 NOTE — PLAN OF CARE
Pt alert and oriented x4. Pt tolerating reg diet. Pt denies sob at rest, dyspnea with exertion. Pt up with gait belt and walker. Pt denies chest pain. Pt with adequate urine output. Pt up x2 in room. Pt on heparin drip 1150 units, recheck hept 10a at 1900 and received IV lasix dose.

## 2023-10-11 NOTE — PROGRESS NOTES
[facility]    Medicine Progress Note - Hospitalist Service    Date of Admission:  10/10/2023    Assessment & Plan   Moira Andre is a 89 year old female admitted on 10/10/2023. She has a history of type 2 diabetes mellitus with diabetic nephropathy, chronic kidney disease stage III, spinal stenosis of the lumbosacral region, depression, ventral hernia, obstructive apnea on CPAP, asthma, and hypertension. Evaluation in the ER revealed atrial fibrillation with rapid ventricular response an concern for pulmonary edema. She was started on supplemental oxygen and given IV lasix. The hospitalist service was contacted to admit her for further evaluation and management.    Acute hypoxic respiratory failure  Atrial fibrillation with RVR, new diagnosis  Pulmonary edema  Acute on chronic congestive heart failure with preserved ejection fraction  Presented with worsening shortness of breath and fatigue. Had some mild symptoms through most of the summer, became worse about 2 days prior to admission. Increased lower extremity edema. No chest pain or palpitations. Noted to be hypoxic and tachycardic in the ER. EKG with atrial fibrillation with RVR. CXR showed pulmonary vascular congestion, mild interstitial edema, cardiomegaly. Troponin mildly elevated at 25 --> 27. Started on supplemental oxygen and IV lasix, then admitted to the hospitalist service.  Admitted to inpatient.  Cardiology consulted, appreciate their assistance.  Monitor telemetry.   Continue PTA Toprol XL. Defer further rate/rhythm control medications to cardiology.  Good response to IV lasix, will give another dose today.  Monitor I&O and daily weights.  [id number] is 5. Risks/benefits of anticoagulation were discussed with patient by admitted hospitalist, patient agreed with anticoagulation.  Continue IV heparin for now. Consult pharmacy regarding coverage for oral anticoagulation.  Currently on 2 lpm of supplemental oxygen, wean as  tolerated.  TTE ordered and pending.  Bilateral lower extremity Doppler ultrasound negative for DVT on 10/10/23.  Check TSH and magnesium. Hyperkalemia resolved.  PT consult.    Coronary artery disease  Elevated troponin  Mild MR  Mild-moderate MS  Mild AS  Hyperlipidemia  Hypertension  Prior history of BMS x 2 to OM and diagonal in 2007. Most recent angiogram in 2017 did not reveal any significant obstructive disease.  Continue PTA plavix, Toprol XL, and atorvastatin.  TTE ordered.  Troponin flat on recheck, suspect due to atrial fibrillation with RVR, CHF.  Cardiology consulted as noted above.    Asthma  Denies any significant upper respiratory symptoms suggestive of infection. No wheezes noted. Suspect her respiratory complaints are due to pulmonary edema/CHF as noted above.  COVID-19, Influenza A/B, and RSV PCR testing negative on 10/10/23.  Continue PTA allerga and inhalers/formulary substitutes.  PRN xopenex nebulizer available.    Chronic kidney disease, stage IIIb  Baseline creatinine between 1.4-1.6. Creatinine stable and at baseline in the ER.  Monitor creatinine closely as she is diuresed.  Avoid nephrotoxic medications.    Diabetes mellitus, type 2  Hemoglobin A1c 6.5% on 6/26/23.  PTA was on glimepiride and semaglutide.  Hold PTA medications.  Monitor blood sugars and use sliding scale NovoLog as indicated.  Monitor for hypoglycemia.    Hypertension  Continue PTA hydralazine and Toprol XL.    Mild hyperkalemia  Resolved after IV lasix.  Recheck in AM.    ELIGIO  CPAP per home settings.  Her home CPAP mask broke and she has not been using CPAP for the past 6 months or so. Untreated ELIGIO could be contributing to cardiac issues noted above. Discussed importance of appropriate treatment of ELIGIO.  Discussed with care transitions, will look into potential options to get her CPAP equipment fixed/replaced.    Depression  Continue PTA wellbutrin and duloxetine.          Diet: Low Saturated Fat Na <2400 mg    DVT  "Prophylaxis: IV heparin  Charles Catheter: Not present  Lines: None     Cardiac Monitoring: ACTIVE order. Indication: Tachyarrhythmias, acute (48 hours)  Code Status: Full Code      Clinically Significant Risk Factors Present on Admission        # Hyperkalemia: Highest K = 5.8 mmol/L in last 2 days, will monitor as appropriate         # Drug Induced Platelet Defect: home medication list includes an antiplatelet medication   # Hypertension: Noted on problem list     # DMII: A1C = N/A within past 6 months    # Severe Obesity: Estimated body mass index is 42.63 kg/m  as calculated from the following:    Height as of this encounter: 1.702 m (5' 7\").    Weight as of this encounter: 123.5 kg (272 lb 3.2 oz).       # Asthma: noted on problem list        Disposition Plan      Expected Discharge Date: 10/12/2023                    Nikolas Vang MD  Hospitalist Service  [facility]  Securely message with [employer] (more info)  Text page via [employer] Paging/Directory   ______________________________________________________________________    Interval History   Moira Andre was seen this morning. She feels better today. Shortness of breath improved but not resolved. Denies fevers, chest pain, palpitations, nausea, abdominal pain. Had good response to IV lasix yesterday. She lives in an apartment alone. Normally gets around with a walker when she is out of her apartment, balances using furniture/railings in her apartment. Still drives and gets her own groceries.    Physical Exam   Vital Signs: Temp: 97.7  F (36.5  C) Temp src: Oral BP: 126/66 Pulse: 89   Resp: 20 SpO2: 94 % O2 Device: Nasal cannula Oxygen Delivery: 2 LPM  Weight: 272 lbs 3.2 oz    Constitutional: awake, alert, cooperative, no apparent distress, laying in the hospital bed with the head of the bed elevated  Respiratory: no increased work of breathing, crackles at the bases  Cardiovascular: irregular rhythm, tachycardic, normal S1 and " S2  GI: normal bowel sounds, soft, non-distended, non-tender  Skin: warm, dry  Musculoskeletal: 2+ lower extremity pitting edema present  Neurologic: awake, alert, answers questions appropriately, moves all extremities    Medical Decision Making       60 MINUTES SPENT BY ME on the date of service doing chart review, history, exam, documentation & further activities per the note.      Data     I have personally reviewed the following data over the past 24 hrs:    9.9  \   11.7   / 298     136 102 32.0 (H) /  158 (H)   5.0 22 1.59 (H) \     ALT: 26 AST: 19 AP: 88 TBILI: 0.3   ALB: 3.6 TOT PROTEIN: 6.7 LIPASE: N/A     Trop: 27 (H) BNP: N/A     Procal: N/A CRP: N/A Lactic Acid: N/A         Imaging results reviewed over the past 24 hrs:   Recent Results (from the past 24 hour(s))   US Lower Extremity Venous Duplex Right    Narrative    VENOUS ULTRASOUND RIGHT LEG  10/10/2023 3:19 PM     HISTORY: leg swelling    COMPARISON: None.    FINDINGS:  Examination of the deep veins with graded compression and  color flow Doppler with spectral wave form analysis was performed.   There is no evidence for DVT in the right lower extremity. Only one of  the paired peroneal veins is visualized.      Impression    IMPRESSION: No evidence of deep venous thrombosis.    DAE HONG MD         SYSTEM ID:  [id number]

## 2023-10-11 NOTE — PROGRESS NOTES
"   10/11/23 4777   Appointment Info   Signing Clinician's Name / Credentials (PT) Kevan Mcclendon, PT, DPT       Present no   Living Environment   People in Home alone   Current Living Arrangements apartment   Home Accessibility stairs to enter home   Number of Stairs, Main Entrance 1   Stair Railings, Main Entrance none   Transportation Anticipated car, drives self;family or friend will provide   Living Environment Comments Pt lives alone in an apartment. 1 step up to enter from parking lot. Pt drives at baseline. Has a son and DIL nearby, but does not receive any assistance from them.   Self-Care   Usual Activity Tolerance good   Current Activity Tolerance fair   Regular Exercise No   Equipment Currently Used at Home walker, rolling   Fall history within last six months no   Activity/Exercise/Self-Care Comment Pt reporting use of 4WW with community mobility, but furniture surfing when inside apartment. IND with ADLs.   General Information   Onset of Illness/Injury or Date of Surgery 10/10/23   Referring Physician Nikolas Vang MD   Patient/Family Therapy Goals Statement (PT) reduce shortness of breath   Pertinent History of Current Problem (include personal factors and/or comorbidities that impact the POC) Per chart review, \"Moira Andre is a 89 year old female who was admitted on 10/10/2023 with acute congestive heart failure found to be in atrial fibrillation with rapid ventricular response.\"   Existing Precautions/Restrictions fall;oxygen therapy device and L/min   General Observations Pt on 3L/min O2 supp during evaluation   Cognition   Affect/Mental Status (Cognition) WFL   Orientation Status (Cognition) oriented x 4   Follows Commands (Cognition) WFL   Integumentary/Edema   Integumentary/Edema no deficits were identifed   Posture    Posture Forward head position   Range of Motion (ROM)   Range of Motion ROM is WFL   ROM Comment LE AROM mildly limited 2/2 adipose tissue " approximation and weakness   Strength (Manual Muscle Testing)   Strength (Manual Muscle Testing) Deficits observed during functional mobility   Strength Comments mild functional strength deficits   Bed Mobility   Comment, (Bed Mobility) did not assess, pt sitting up at edge of bed upon entry to room.   Transfers   Comment, (Transfers) CGA sit>stand w/ 4WW   Gait/Stairs (Locomotion)   Distance in Feet 10' eval   Comment, (Gait/Stairs) Pt ambulated 10' w/ FWW and CGA for eval   Balance   Balance Comments impaired dynamic balance   Sensory Examination   Sensory Perception patient reports no sensory changes   Clinical Impression   Criteria for Skilled Therapeutic Intervention Yes, treatment indicated   PT Diagnosis (PT) impaired functional mobility   Influenced by the following impairments impaired strength, balance, activity tolerance   Functional limitations due to impairments limited IND with mobility   Clinical Presentation (PT Evaluation Complexity) evolving   Clinical Presentation Rationale clinical judgement, conintued resolution of SOB   Clinical Decision Making (Complexity) low complexity   Planned Therapy Interventions (PT) bed mobility training;balance training;gait training;home exercise program;patient/family education;ROM (range of motion);stair training;strengthening;transfer training;progressive activity/exercise;home program guidelines   Risk & Benefits of therapy have been explained evaluation/treatment results reviewed;care plan/treatment goals reviewed;risks/benefits reviewed;current/potential barriers reviewed;participants voiced agreement with care plan;participants included;patient   PT Total Evaluation Time   PT Eval, Low Complexity Minutes (00668) 5   Physical Therapy Goals   PT Frequency Daily   PT Predicted Duration/Target Date for Goal Attainment 10/18/23   PT Goals Bed Mobility;Transfers;Gait;Stairs   PT: Bed Mobility Independent;Supine to/from sit;Rolling   PT: Transfers Modified  independent;Sit to/from stand;Bed to/from chair;Assistive device   PT: Gait Modified independent;Rolling walker;150 feet   PT: Stairs Modified independent;1 stair;Assistive device   Interventions   Interventions Quick Adds Gait Training;Therapeutic Activity   Therapeutic Activity   Therapeutic Activities: dynamic activities to improve functional performance Minutes (51630) 13   Symptoms Noted During/After Treatment Fatigue;Shortness of breath   Treatment Detail/Skilled Intervention Greeted pt upon arrival to room. Pt agreeable to working with PT. Pt sitting up at edge of bed upon arrival to room. On 3L/min O2 supp during session, O2 sats 97% prior to mobility, decreasing to 86% with mobility, quickly improving to mid 90's with seated rest break and cueing for use of PLB. Sit>stand w/ CGA and 4WW, pt needing to utilize momentum to come to standing. Cueing for safe and efficient sit<>stand procedure including scooting to the front edge of the chair, to lean forward with nose over toes, and hand and foot placement. Following ambulation, stand>sit to arm chair in room w/ 4WW and CGA. Pt left with all needs met and call light within reach.   Gait Training   Gait Training Minutes (68690) 8   Symptoms Noted During/After Treatment (Gait Training) fatigue;shortness of breath   Treatment Detail/Skilled Intervention Pt ambulated 180' w/ 4WW and CGA, progressing to SBA throughout. Noting pt demonstrating forward head  posture with head and eyes facing downward and stooped forward posture. Cueing to stand tall with head and eyes up. Improved upright posture following cueing.   PT Discharge Planning   PT Plan PT: activity tolerance, monitor O2 sats w/ supplemental O2, transfer w/ 4WW, ambulate w/ 4WW, 1 stair without railing as able.   PT Discharge Recommendation (DC Rec) Transitional Care Facility;home with assist;home with home care physical therapy   PT Rationale for DC Rec Pt is below baseline mobility levels at this time,  but ambulating well with mostly SBA and use of 4WW. Limited at this time by increased O2 demand and heavy shortness of breath with activity. Pt lives alone in an apartment with 1 stair to enter, pt will need to progress to mod I level assist with all mobility with use of 4WW to safely discharge home. Would also recommend discharge with HHPT if able to progress to this level of assist. Currently recommend discharge to TCU for continued strengthening and mobility progressions prior to returning home.   PT Brief overview of current status A x1 w/ FWW   Total Session Time   Timed Code Treatment Minutes 21   Total Session Time (sum of timed and untimed services) 26

## 2023-10-11 NOTE — PLAN OF CARE
VSS, diuresing with IV lasix, on 2 L NC-wean as able, up with 1 gb/walker. Afib . Incontinent, purewick in use. Blood sugar stable. Cards following, echo tomorrow. Lives alone in independent apartment. Medication adjusted for HR control and new Eliquis started.

## 2023-10-11 NOTE — CONSULTS
"Patient has Medicare D through [facility].  Patient's drug costs have exceeded $4660, and as such will now pay a 25% coinsurance on all covered drugs.  (Also called the \"coverage gap\" or \"donut hole.\")      Xarelto/Eliquis  --Upon receipt of RX, Discharge Pharmacy can provide 1 mo free.  --Subsequent fills will be $152/mo.  --If total out-of-pocket costs exceed $7400, coinsurance will be reduced to a 5%, equivalent to $30/mo.    [facility] offers Xarelto through the mail to all patients in the coverage gap, regardless of income, for $85/mo or $240/3 mo.  Patient can enroll in this program at [employer] or by calling [employer] ([phone]).      Please reconsult if income-based resources for free or discounted medication are needed.    Kandi Farrell  Pharmacy Technician/Liaison, Discharge Pharmacy   [phone] (voice or text)  [email or web address]      "

## 2023-10-11 NOTE — CONSULTS
[facility]  Cardiology Consultation     Date of Admission:  10/10/2023    Assessment & Plan   Moira Andre is a 89 year old female who was admitted on 10/10/2023 with acute congestive heart failure found to be in atrial fibrillation with rapid ventricular response.     Atrial fibrillation with RVR  - Remains in Afib with HR 's  - CHADSs2-VASc score of 7 (age++, female+, CHF+, HTN+, CAD+, DM+)  - On heparin gtt  - Rate control: PTA Toprol  mg daily     Acute congestive heart failure, NYHA class II symptoms   - NTpBNP 4629, CXR c/w congestion   - Volume status: mildly hypervolemic s/p IV lasix 20 mg x once  - Echo (2020) with LVEF 60-65%, no significant valvular disease   - Etiology likely tachy-mediated in the setting of AFib RVR    Acute hypoxic respiratory failure 2/2 to #2  Hypertension, adequately controlled   CKD stage IIIb with baseline cr 1.4-1.6  CAD with remote hx of PCI to OM/diagonal in 2007 on plavix     Plan:   Increase Toprol XL to 100 mg BID.   Transition to Eliquis 5 mg BID for stroke prophylaxis.   Start IV lasix 40 mg daily.   Obtain echocardiogram.   Daily BMP, replace lytes per protocol.   Daily standing weights/strict I&O's/2G Na diet.   Will continue to follow.     High complexity     Bebe Llamas, CNP    Primary Care Physician   Maryan Churchill    Reason for Consult   Reason for consult: I was asked to evaluate this patient for Afib with RVR.    History of Present Illness   Moira Andre is a 89 year old female admitted on 10/10/2023 with history of type 2 diabetes mellitus with diabetic nephropathy , chronic kidney disease stage III, spinal stenosis of the lumbosacral region, depression, obstructive sleep apnea on CPAP, asthma, and hypertension.     Patient presenting with evaluation of shortness of breath.  Does not notice any palpitations.  She does not have any history of atrial fibrillation as per the patient.  Denies any chest pain.  Having  some mild shortness of breath.  She was seen in the primary care clinic where her heart rate was in the 120s.  She also had low oxygen saturations.    In the ED, EKG showed Afib with RVR. CXR showing pulmonary vascular congestion. Troponins mildly elevated, but flat. NTpBNP 4K.       Past Medical History   Past Medical History:   Diagnosis Date    Aortic valve sclerosis     heart murmur, no AS    Arrhythmia     PAT, PVC    Aspirin allergy     Plavix use long term    Asthma     CKD (chronic kidney disease) stage 3, GFR 30-59 ml/min (H)     x 2007 atleast    Congestive heart failure, unspecified     Depression     Diabetes mellitus (H) 2010    Diastolic dysfunction, left ventricle 2013    grade 2, nl ef    HTN (hypertension)     Lactic acidosis 08/2018    due to dehydration and metformin    Migraine headache     Mitral stenosis     mild, 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    Myocardial infarction (H) 9/2007, cath 2013 ml    BMS: stent to OM, diag, nl EF, echo /C angia 2013 , f/u cath no lesion >40%    Nephrolithiasis     right side    OA (osteoarthritis) of knee     Obesity     Rheumatoid arthritis flare (H)     prednisone    Sleep apnea     restarted using cpap 2017    TIA (transient ischaemic attack)     Ventral hernia, unspecified, without mention of obstruction or gangrene          Past Surgical History   Past Surgical History:   Procedure Laterality Date    APPENDECTOMY      BIOPSY BREAST      x2 -needle & lumpectomy-benign    CHOLECYSTECTOMY      CORONARY ANGIOGRAPHY ADULT ORDER  9/28/2007    Bare metal stent to OM1, Diagonal patent     CORONARY ANGIOGRAPHY ADULT ORDER  9/25/2007    [place] stent to Diagonal    HC LEFT HEART CATHETERIZATION  8/2013    Moderate CAD    HYSTERECTOMY TOTAL ABDOMINAL      ORTHOPEDIC SURGERY      knee replacement on right side (2006), Left side (2016)    RELEASE CARPAL TUNNEL      right and left    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 Taking?   DULoxetine (CYMBALTA) 60 MG capsule 10/9/2023 at pm  No Yes   Sig: Take 1 capsule (60 mg) by mouth daily   IBANdronate (BONIVA) 150 MG tablet 10/1/2023  No Yes   Sig: TAKE 1 TABLET BY MOUTH  EVERY 30 DAYS FOR  OSTEOPENIA   Menthol (Topical Analgesic) 7.5 % (Roll) MISC  at prn Self Yes Yes   Sig: Apply to affected area every morning   Multiple Vitamins-Minerals (HAIR SKIN AND NAILS FORMULA PO) 10/9/2023 at pm  Yes Yes   Sig: Take 1 tablet by mouth daily   Semaglutide (RYBELSUS) 7 MG tablet   No No   Sig: Take 1 tablet (7 mg) by mouth daily   Patient not taking: Reported on 10/10/2023   Vitamin D3 (CHOLECALCIFEROL) 25 mcg (1000 units) tablet 10/9/2023 at pm Self Yes Yes   Sig: Take 100 Units by mouth daily   acetaminophen (TYLENOL) 500 MG tablet 10/10/2023 at am  Yes Yes   Sig: Take 1,000 mg by mouth 2 times daily   albuterol (PROAIR HFA/PROVENTIL HFA/VENTOLIN HFA) 108 (90 Base) MCG/ACT inhaler  at prn  No Yes   Sig: INHALE 2 PUFFS INTO THE LUNGS EVERY 6 HOURS FOR SHORTNESS OF BREATH   atorvastatin (LIPITOR) 20 MG tablet 10/9/2023 at pm  No Yes   Sig: TAKE 1 TABLET BY MOUTH DAILY FOR CHOLESTEROL   buPROPion (WELLBUTRIN SR) 100 MG 12 hr tablet 10/9/2023 at pm  No Yes   Sig: Take 1 tablet (100 mg) by mouth daily for mood   calcium carbonate (TUMS) 500 MG chewable tablet  at prn  Yes Yes   Sig: Take 1 chew tab by mouth 2 times daily as needed for heartburn   camphor-menthol-methyl salicylate 3.1-6-10 % PTCH  at prn  Yes Yes   Sig: Externally apply 1 patch topically as needed (hand pain)   clopidogrel (PLAVIX) 75 MG tablet 10/9/2023 at pm  No Yes   Sig: TAKE 1 TABLET BY MOUTH DAILY   fexofenadine (ALLEGRA) 180 MG tablet 10/9/2023 at pm  Yes Yes   Sig: Take 180 mg by mouth every evening   fluticasone (FLONASE) 50 MCG/ACT nasal spray  at prn Self Yes Yes   Sig: Spray 1 spray into both nostrils daily as needed    fluticasone-salmeterol  (WIXELA INHUB) 100-50 MCG/ACT inhaler 10/10/2023 at am  No Yes   Sig: USE 1 INHALATION BY MOUTH EVERY  12 HOURS   Patient taking differently: 1 puff daily USE 1 INHALATION BY MOUTH EVERY  12 HOURS   glimepiride (AMARYL) 2 MG tablet   No No   Sig: TAKE 1 TABLET BY MOUTH IN  THE MORNING BEFORE  BREAKFAST FOR DIABETES   hydrALAZINE (APRESOLINE) 25 MG tablet 10/10/2023 at am  No Yes   Sig: TAKE 1 TABLET BY MOUTH TWICE  DAILY FOR BLOOD PRESSURE   metoprolol succinate ER (TOPROL XL) 100 MG 24 hr tablet 10/9/2023 at pm  No Yes   Sig: TAKE 1 TABLET BY MOUTH  DAILY   nitroGLYcerin (NITROSTAT) 0.4 MG sublingual tablet  at prn  No Yes   Sig: FOR CHEST PAIN PLACE 1 TABLET  UNDER TONGUE EVERY 5 MINUTES FOR 3 DOSES. IF SYMPTOMS PERSIST 5  MINUTES AFTER 1ST DOSE CALL 911   pantoprazole (PROTONIX) 20 MG EC tablet 10/10/2023 at am  No Yes   Sig: Take 1 tablet (20 mg) by mouth daily   triamcinolone (KENALOG) 0.1 % external cream  at prn  No Yes   Sig: Apply topically 2 times daily   Patient taking differently: Apply topically as needed for irritation      Facility-Administered Medications: None     Current Facility-Administered Medications   Medication Dose Route Frequency    acetaminophen  1,000 mg Oral BID    atorvastatin  20 mg Oral QPM    buPROPion  100 mg Oral QPM    clopidogrel  75 mg Oral QPM    DULoxetine  60 mg Oral QPM    fexofenadine  180 mg Oral QPM    fluticasone-vilanterol  1 puff Inhalation Daily    hydrALAZINE  25 mg Oral BID    insulin aspart  1-3 Units Subcutaneous TID AC    insulin aspart  1-3 Units Subcutaneous At Bedtime    metoprolol succinate ER  100 mg Oral QPM    pantoprazole  20 mg Oral Daily     Current Facility-Administered Medications   Medication Last Rate    heparin 1,150 Units/hr (10/11/23 1300)    - MEDICATION INSTRUCTIONS -       Allergies   Allergies   Allergen Reactions    Aspirin Hives     Reaction occurred during childhood.     Lisinopril Cough    Losartan      Hyperkalemia      Metformin       "Elevated lactic acid    Minocycline      Yellow Dye Allergy. Minocycline has Yellow Dye #10.    Mounjaro [Tirzepatide] Diarrhea and GI Disturbance    Salicylates Hives    Yellow Dye Hives     Rxn to yellow tablet. Eyes swelled shut.     Yellow Dyes (Non-Tartrazine) Hives       Social History    reports that she quit smoking about 50 years ago. Her smoking use included cigarettes. She started smoking about 51 years ago. She has a 0.25 pack-year smoking history. She has never used smokeless tobacco. She reports current alcohol use. She reports that she does not use drugs.    Family History   I have reviewed this patient's family history and updated it with pertinent information if needed.  Family History   Problem Relation Age of Onset    Neurologic Disorder Mother         MS - at 60's    C.A.D. Father          at 8o's, ? prostate ca    Breast Cancer No family hx of     Cancer - colorectal No family hx of           Review of Systems   A comprehensive review of system was performed and is negative other than that noted in the HPI or here.     Physical Exam   Vital Signs with Ranges  Temp:  [97.6  F (36.4  C)-98.5  F (36.9  C)] 98.5  F (36.9  C)  Pulse:  [] 72  Resp:  [19-25] 20  BP: (106-146)/() 122/78  SpO2:  [94 %-97 %] 97 %  Wt Readings from Last 4 Encounters:   10/10/23 123.5 kg (272 lb 3.2 oz)   10/10/23 126.1 kg (278 lb)   23 123.6 kg (272 lb 8 oz)   23 127.6 kg (281 lb 4.8 oz)     I/O last 3 completed shifts:  In: 886 [P.O.:800; I.V.:86]  Out: 1900 [Urine:1900]      Vitals: /78 (BP Location: Left arm)   Pulse 72   Temp 98.5  F (36.9  C) (Oral)   Resp 20   Ht 1.702 m (5' 7\")   Wt 123.5 kg (272 lb 3.2 oz)   SpO2 97%   BMI 42.63 kg/m      Physical Exam:   General - Alert and oriented to time place and person in no acute distress  Eyes - No scleral icterus  HEENT - Neck supple, moist mucous membranes  Cardiovascular - irregular rate/rhythm  Extremities - There is 1-2+ " "BASSEM, L>R.   Respiratory - CTA bilaterally   Skin - No pallor or cyanosis  Gastrointestinal - Non tender and non distended without rebound or guarding  Psych - Appropriate affect   Neurological - No gross motor neurological focal deficits    No lab results found in last 7 days.    Invalid input(s): \"TROPONINIES\"    Recent Labs   Lab 10/11/23  1144 10/11/23  1104 10/11/23  0720 10/11/23  0023 10/10/23  2154 10/10/23  1635 10/10/23  1438   WBC  --  9.9  --   --  11.5*  --  10.1   HGB  --  11.7  --   --  12.0  --  12.6   MCV  --  82  --   --  82  --  82   PLT  --  298  --   --  291  --  285   NA  --  136  --   --   --   --  135   POTASSIUM  --  5.0  --   --  4.8 5.6* 5.8*   CHLORIDE  --  102  --   --   --   --  102   CO2  --  22  --   --   --   --  18*   BUN  --  32.0*  --   --   --   --  33.2*   CR  --  1.59*  --   --   --   --  1.42*   GFRESTIMATED  --  31*  --   --   --   --  35*   ANIONGAP  --  12  --   --   --   --  15   TELLY  --  9.4  --   --   --   --  9.3   * 175* 192*   < >  --   --  189*   ALBUMIN  --  3.6  --   --   --   --  3.7   PROTTOTAL  --  6.7  --   --   --   --  7.2   BILITOTAL  --  0.3  --   --   --   --  0.4   ALKPHOS  --  88  --   --   --   --   --    ALT  --  26  --   --   --   --   --    AST  --  19  --   --   --   --   --     < > = values in this interval not displayed.     Recent Labs   Lab Test 06/26/23  1421 10/24/22  1245 02/09/16  0809 10/13/15  1008   CHOL 140 145   < > 126   HDL 45* 44*   < > 54   LDL 57 52   < > 44   TRIG 189* 246*   < > 140   CHOLHDLRATIO  --   --   --  2.3    < > = values in this interval not displayed.     Recent Labs   Lab 10/11/23  1104 10/10/23  2154 10/10/23  1438   WBC 9.9 11.5* 10.1   HGB 11.7 12.0 12.6   HCT 38.2 38.7 41.0   MCV 82 82 82    291 285     Recent Labs   Lab 10/10/23  1440   PHV 7.41   PO2V 45   PCO2V 36*   HCO3V 23     Recent Labs   Lab 10/10/23  1438   NTBNPI 4,629*     No results for input(s): \"DD\" in the last 168 hours.  No results " "for input(s): \"SED\", \"CRP\" in the last 168 hours.  Recent Labs   Lab 10/11/23  1104 10/10/23  2154 10/10/23  1438    291 285     Recent Labs   Lab 10/11/23  1104   TSH 1.65     No results for input(s): \"COLOR\", \"APPEARANCE\", \"URINEGLC\", \"URINEBILI\", \"URINEKETONE\", \"SG\", \"UBLD\", \"URINEPH\", \"PROTEIN\", \"UROBILINOGEN\", \"NITRITE\", \"LEUKEST\", \"RBCU\", \"WBCU\" in the last 168 hours.    Imaging:  Recent Results (from the past 48 hour(s))   XR Chest 2 Views    Narrative    CHEST TWO VIEWS  10/10/2023 2:56 PM       INDICATION: Shortness of breath.    COMPARISON: 4/19/2023       Impression    IMPRESSION: New trace bilateral pleural effusions. Cardiomegaly with  pulmonary vascular congestion. Increased interstitial markings likely  represent mild interstitial edema. No evidence of pneumonia.    KASSANDRA SCHILLING MD         SYSTEM ID:  [id number]   US Lower Extremity Venous Duplex Right    Narrative    VENOUS ULTRASOUND RIGHT LEG  10/10/2023 3:19 PM     HISTORY: leg swelling    COMPARISON: None.    FINDINGS:  Examination of the deep veins with graded compression and  color flow Doppler with spectral wave form analysis was performed.   There is no evidence for DVT in the right lower extremity. Only one of  the paired peroneal veins is visualized.      Impression    IMPRESSION: No evidence of deep venous thrombosis.    DAE HONG MD         SYSTEM ID:  [id number]       Echo:  No results found for this or any previous visit (from the past 4320 hour(s)).    Clinically Significant Risk Factors Present on Admission        # Hyperkalemia: Highest K = 5.8 mmol/L in last 2 days, will monitor as appropriate         # Drug Induced Platelet Defect: home medication list includes an antiplatelet medication   # Hypertension: Noted on problem list     # DMII: A1C = N/A within past 6 months    # Severe Obesity: Estimated body mass index is 42.63 kg/m  as calculated from the following:    Height as of this encounter: 1.702 m (5' 7\").    " Weight as of this encounter: 123.5 kg (272 lb 3.2 oz).       # Asthma: noted on problem list

## 2023-10-11 NOTE — PROGRESS NOTES
RECEIVING UNIT ED HANDOFF REVIEW    ED Nurse Handoff Report was reviewed by: Mj Beckford RN on October 10, 2023 at 10:10 PM

## 2023-10-11 NOTE — PLAN OF CARE
Goal Outcome Evaluation:  Overnight ED admission transfer to [facility] with heparin infusing iv gtts.  A&O x4 on 2L/NC sat  low to mid 90s. Tele Afib w/CVR. Trending troponin 25->27. Up with 1A/GB/W. Plans for cardiology consult.

## 2023-10-12 ENCOUNTER — APPOINTMENT (OUTPATIENT)
Dept: CARDIOLOGY | Facility: CLINIC | Age: 88
DRG: 291 | End: 2023-10-12
Attending: NURSE PRACTITIONER
Payer: COMMERCIAL

## 2023-10-12 ENCOUNTER — APPOINTMENT (OUTPATIENT)
Dept: PHYSICAL THERAPY | Facility: CLINIC | Age: 88
DRG: 291 | End: 2023-10-12
Payer: COMMERCIAL

## 2023-10-12 LAB
ANION GAP SERPL CALCULATED.3IONS-SCNC: 17 MMOL/L (ref 7–15)
BUN SERPL-MCNC: 33 MG/DL (ref 8–23)
CALCIUM SERPL-MCNC: 9.4 MG/DL (ref 8.2–9.6)
CHLORIDE SERPL-SCNC: 98 MMOL/L (ref 98–107)
CREAT SERPL-MCNC: 1.64 MG/DL (ref 0.51–0.95)
DEPRECATED HCO3 PLAS-SCNC: 20 MMOL/L (ref 22–29)
EGFRCR SERPLBLD CKD-EPI 2021: 29 ML/MIN/1.73M2
GLUCOSE BLDC GLUCOMTR-MCNC: 119 MG/DL (ref 70–99)
GLUCOSE BLDC GLUCOMTR-MCNC: 140 MG/DL (ref 70–99)
GLUCOSE BLDC GLUCOMTR-MCNC: 146 MG/DL (ref 70–99)
GLUCOSE BLDC GLUCOMTR-MCNC: 191 MG/DL (ref 70–99)
GLUCOSE BLDC GLUCOMTR-MCNC: 231 MG/DL (ref 70–99)
GLUCOSE SERPL-MCNC: 215 MG/DL (ref 70–99)
LACTATE SERPL-SCNC: 1.6 MMOL/L (ref 0.7–2)
LVEF ECHO: NORMAL
MAGNESIUM SERPL-MCNC: 1.7 MG/DL (ref 1.7–2.3)
POTASSIUM SERPL-SCNC: 4.6 MMOL/L (ref 3.4–5.3)
SODIUM SERPL-SCNC: 135 MMOL/L (ref 135–145)

## 2023-10-12 PROCEDURE — 250N000011 HC RX IP 250 OP 636: Mod: JZ | Performed by: NURSE PRACTITIONER

## 2023-10-12 PROCEDURE — 250N000013 HC RX MED GY IP 250 OP 250 PS 637: Performed by: NURSE PRACTITIONER

## 2023-10-12 PROCEDURE — 93306 TTE W/DOPPLER COMPLETE: CPT | Mod: 26 | Performed by: INTERNAL MEDICINE

## 2023-10-12 PROCEDURE — 99233 SBSQ HOSP IP/OBS HIGH 50: CPT | Mod: 25 | Performed by: NURSE PRACTITIONER

## 2023-10-12 PROCEDURE — 80048 BASIC METABOLIC PNL TOTAL CA: CPT | Performed by: HOSPITALIST

## 2023-10-12 PROCEDURE — 83735 ASSAY OF MAGNESIUM: CPT | Performed by: HOSPITALIST

## 2023-10-12 PROCEDURE — 36415 COLL VENOUS BLD VENIPUNCTURE: CPT | Performed by: HOSPITALIST

## 2023-10-12 PROCEDURE — 97530 THERAPEUTIC ACTIVITIES: CPT | Mod: GP

## 2023-10-12 PROCEDURE — 250N000013 HC RX MED GY IP 250 OP 250 PS 637: Performed by: HOSPITALIST

## 2023-10-12 PROCEDURE — 210N000002 HC R&B HEART CARE

## 2023-10-12 PROCEDURE — 83605 ASSAY OF LACTIC ACID: CPT | Performed by: HOSPITALIST

## 2023-10-12 PROCEDURE — 99233 SBSQ HOSP IP/OBS HIGH 50: CPT | Performed by: HOSPITALIST

## 2023-10-12 PROCEDURE — 999N000208 ECHOCARDIOGRAM COMPLETE

## 2023-10-12 PROCEDURE — 250N000013 HC RX MED GY IP 250 OP 250 PS 637: Performed by: STUDENT IN AN ORGANIZED HEALTH CARE EDUCATION/TRAINING PROGRAM

## 2023-10-12 PROCEDURE — 250N000013 HC RX MED GY IP 250 OP 250 PS 637: Performed by: EMERGENCY MEDICINE

## 2023-10-12 PROCEDURE — 255N000002 HC RX 255 OP 636: Performed by: STUDENT IN AN ORGANIZED HEALTH CARE EDUCATION/TRAINING PROGRAM

## 2023-10-12 RX ORDER — FUROSEMIDE 20 MG
20 TABLET ORAL
Status: DISCONTINUED | OUTPATIENT
Start: 2023-10-12 | End: 2023-10-13

## 2023-10-12 RX ORDER — FUROSEMIDE 20 MG
20 TABLET ORAL
Status: DISCONTINUED | OUTPATIENT
Start: 2023-10-12 | End: 2023-10-12

## 2023-10-12 RX ADMIN — BUPROPION HYDROCHLORIDE 100 MG: 100 TABLET, FILM COATED, EXTENDED RELEASE ORAL at 20:29

## 2023-10-12 RX ADMIN — HYDRALAZINE HYDROCHLORIDE 25 MG: 25 TABLET ORAL at 20:29

## 2023-10-12 RX ADMIN — PANTOPRAZOLE SODIUM 20 MG: 20 TABLET, DELAYED RELEASE ORAL at 08:09

## 2023-10-12 RX ADMIN — DULOXETINE HYDROCHLORIDE 60 MG: 60 CAPSULE, DELAYED RELEASE ORAL at 20:29

## 2023-10-12 RX ADMIN — FEXOFENADINE HCL 180 MG: 180 TABLET ORAL at 20:29

## 2023-10-12 RX ADMIN — ACETAMINOPHEN 1000 MG: 500 TABLET, FILM COATED ORAL at 08:09

## 2023-10-12 RX ADMIN — FUROSEMIDE 40 MG: 10 INJECTION, SOLUTION INTRAMUSCULAR; INTRAVENOUS at 08:10

## 2023-10-12 RX ADMIN — CLOPIDOGREL BISULFATE 75 MG: 75 TABLET ORAL at 20:29

## 2023-10-12 RX ADMIN — FUROSEMIDE 20 MG: 20 TABLET ORAL at 17:28

## 2023-10-12 RX ADMIN — ACETAMINOPHEN 1000 MG: 500 TABLET, FILM COATED ORAL at 20:29

## 2023-10-12 RX ADMIN — FLUTICASONE PROPIONATE 1 SPRAY: 50 SPRAY, METERED NASAL at 08:09

## 2023-10-12 RX ADMIN — HUMAN ALBUMIN MICROSPHERES AND PERFLUTREN 9 ML: 10; .22 INJECTION, SOLUTION INTRAVENOUS at 11:00

## 2023-10-12 RX ADMIN — FLUTICASONE FUROATE AND VILANTEROL TRIFENATATE 1 PUFF: 100; 25 POWDER RESPIRATORY (INHALATION) at 08:09

## 2023-10-12 RX ADMIN — MICONAZOLE NITRATE: 2 POWDER TOPICAL at 20:31

## 2023-10-12 RX ADMIN — METOPROLOL SUCCINATE 100 MG: 100 TABLET, EXTENDED RELEASE ORAL at 08:09

## 2023-10-12 RX ADMIN — APIXABAN 2.5 MG: 2.5 TABLET, FILM COATED ORAL at 20:29

## 2023-10-12 RX ADMIN — HYDRALAZINE HYDROCHLORIDE 25 MG: 25 TABLET ORAL at 08:09

## 2023-10-12 RX ADMIN — MICONAZOLE NITRATE: 2 POWDER TOPICAL at 12:35

## 2023-10-12 RX ADMIN — ATORVASTATIN CALCIUM 20 MG: 20 TABLET, FILM COATED ORAL at 20:29

## 2023-10-12 RX ADMIN — METOPROLOL SUCCINATE 100 MG: 100 TABLET, EXTENDED RELEASE ORAL at 20:29

## 2023-10-12 RX ADMIN — INSULIN ASPART 1 UNITS: 100 INJECTION, SOLUTION INTRAVENOUS; SUBCUTANEOUS at 08:11

## 2023-10-12 RX ADMIN — APIXABAN 2.5 MG: 2.5 TABLET, FILM COATED ORAL at 08:09

## 2023-10-12 RX ADMIN — INSULIN ASPART 1 UNITS: 100 INJECTION, SOLUTION INTRAVENOUS; SUBCUTANEOUS at 17:28

## 2023-10-12 ASSESSMENT — ACTIVITIES OF DAILY LIVING (ADL)
ADLS_ACUITY_SCORE: 36
ADLS_ACUITY_SCORE: 34
ADLS_ACUITY_SCORE: 34
ADLS_ACUITY_SCORE: 38
ADLS_ACUITY_SCORE: 36
ADLS_ACUITY_SCORE: 34
ADLS_ACUITY_SCORE: 36
ADLS_ACUITY_SCORE: 38
ADLS_ACUITY_SCORE: 36
ADLS_ACUITY_SCORE: 34
ADLS_ACUITY_SCORE: 36
ADLS_ACUITY_SCORE: 36

## 2023-10-12 NOTE — PROGRESS NOTES
Care Management Follow Up    Length of Stay (days): 2    Expected Discharge Date: 10/13/2023     Concerns to be Addressed:     Discharge planning   Patient plan of care discussed at interdisciplinary rounds: Yes    Anticipated Discharge Disposition:  TCU vs. Homecare      Anticipated Discharge Services:homecare vs. TCU    Anticipated Discharge DME:  N/A    Patient/family educated on Medicare website which has current facility and service quality ratings:    Education Provided on the Discharge Plan:    Patient/Family in Agreement with the Plan:      Referrals Placed by CM/SW:    Private pay costs discussed: Not applicable    Additional Information:  Per chart note, therapy is recommending TCU at this time. Writer attempted to meet with patient at bedside, however, patient was getting an Echo at bedside. Care management will follow up at a later time.    FABRICIO Galvan, SW   Social Work   [facility]

## 2023-10-12 NOTE — PROGRESS NOTES
[facility]    Medicine Progress Note - Hospitalist Service    Date of Admission:  10/10/2023    Assessment & Plan   Moira Andre is a 89 year old female admitted on 10/10/2023. She has a history of type 2 diabetes mellitus with diabetic nephropathy, chronic kidney disease stage III, spinal stenosis of the lumbosacral region, depression, ventral hernia, obstructive apnea on CPAP, asthma, and hypertension. Evaluation in the ER revealed atrial fibrillation with rapid ventricular response an concern for pulmonary edema. She was started on supplemental oxygen and given IV lasix. The hospitalist service was contacted to admit her for further evaluation and management.    Acute hypoxic respiratory failure  Atrial fibrillation with RVR, new diagnosis  Pulmonary edema  Acute on chronic congestive heart failure with preserved ejection fraction  Presented with worsening shortness of breath and fatigue. Had some mild symptoms through most of the summer, became worse about 2 days prior to admission. Increased lower extremity edema. No chest pain or palpitations. Noted to be hypoxic and tachycardic in the ER. EKG with atrial fibrillation with RVR. CXR showed pulmonary vascular congestion, mild interstitial edema, cardiomegaly. Troponin mildly elevated at 25 --> 27. Started on supplemental oxygen and IV lasix, then admitted to the hospitalist service.  Admitted to inpatient.  Cardiology consulted, appreciate their assistance.  Monitor telemetry.   Continue PTA Toprol XL, dose increased during this admission. Defer further rate/rhythm control medications to cardiology.  Good response to IV lasix so far. 40 mg of IV lasix ordered by cardiology this morning.  Monitor I&O and daily weights.  [id number] is 5. Risks/benefits of anticoagulation were discussed with patient by admitted hospitalist, patient agreed with anticoagulation.  Initially started on IV heparin, transitioned to oral Eliquis on  10/11/23.  Currently on 2 lpm of supplemental oxygen, wean as tolerated.  TTE ordered but not yet obtained.  Bilateral lower extremity Doppler ultrasound negative for DVT on 10/10/23.  TSH and magnesium within normal limits. Hyperkalemia resolved.  PT consulted.    Coronary artery disease  Elevated troponin  Mild MR  Mild-moderate MS  Mild AS  Hyperlipidemia  Hypertension  Prior history of BMS x 2 to OM and diagonal in 2007. Most recent angiogram in 2017 did not reveal any significant obstructive disease.  Continue PTA plavix, Toprol XL, hydralazine, and atorvastatin.  Toprol XL dose increased during this admission as noted above.  TTE ordered but not yet obtained.  Troponin flat on recheck, suspect due to atrial fibrillation with RVR, CHF.  Cardiology consulted as noted above.    Asthma  Denies any significant upper respiratory symptoms suggestive of infection. No wheezes noted. Suspect her respiratory complaints are due to pulmonary edema/CHF as noted above.  COVID-19, Influenza A/B, and RSV PCR testing negative on 10/10/23.  Continue PTA allerga and inhalers/formulary substitutes.  PRN xopenex nebulizer available.    Chronic kidney disease, stage IIIb  Baseline creatinine between 1.4-1.6. Creatinine stable and at baseline in the ER.  Monitor creatinine closely as she is diuresed. Creatinine up slightly at 1.64 on 10/12/23.  Avoid nephrotoxic medications.    Diabetes mellitus, type 2  Hemoglobin A1c 6.5% on 6/26/23.  PTA was on glimepiride and semaglutide.  Hold PTA medications.  Monitor blood sugars and use sliding scale NovoLog as indicated.  Monitor for hypoglycemia.    Mild hyperkalemia  Resolved after IV lasix.  Monitor at least daily while diuresing.    ELIGIO  CPAP per home settings.  Her home CPAP mask broke and she has not been using CPAP for the past 6 months or so. Untreated ELIGIO could be contributing to cardiac issues noted above. Discussed importance of appropriate treatment of ELIGIO.  Discussed with care  "transitions, will look into potential options to get her CPAP equipment fixed/replaced.    Depression  Continue PTA wellbutrin and duloxetine.          Diet: Low Saturated Fat Na <2400 mg    DVT Prophylaxis: DOAC  Charles Catheter: Not present  Lines: None     Cardiac Monitoring: ACTIVE order. Indication: Tachyarrhythmias, acute (48 hours)  Code Status: Full Code      Clinically Significant Risk Factors        # Hyperkalemia: Highest K = 5.8 mmol/L in last 2 days, will monitor as appropriate           # Hypertension: Noted on problem list       # DMII: A1C = N/A within past 6 months, PRESENT ON ADMISSION  # Severe Obesity: Estimated body mass index is 42.46 kg/m  as calculated from the following:    Height as of this encounter: 1.702 m (5' 7\").    Weight as of this encounter: 123 kg (271 lb 1.6 oz)., PRESENT ON ADMISSION     # Asthma: noted on problem list        Disposition Plan      Expected Discharge Date: 10/13/2023                    Nikolas Vang MD  Hospitalist Service  [facility]  Securely message with [employer] (more info)  Text page via [employer] Paging/Directory   ______________________________________________________________________    Interval History   Moira Andre was seen this afternoon. She feels better. Shortness of breath continues to improve. She went for a walk in the hallway this morning with assistance and then sat up in a chair for a while before getting back to bed. Denies fevers, chest pain, palpitations, nausea, abdominal pain.    Physical Exam   Vital Signs: Temp: 97.7  F (36.5  C) Temp src: Oral BP: 114/82 Pulse: 107   Resp: 16 SpO2: 94 % O2 Device: Nasal cannula Oxygen Delivery: 2 LPM  Weight: 271 lbs 1.6 oz    Constitutional: awake, alert, cooperative, no apparent distress, laying in the hospital bed  Respiratory: no increased work of breathing, crackles at the bases  Cardiovascular: irregular rhythm, tachycardic, normal S1 and S2  GI: normal bowel sounds, soft, " non-distended, non-tender  Skin: warm, dry  Musculoskeletal: 1-2+ lower extremity pitting edema present  Neurologic: awake, alert, answers questions appropriately, moves all extremities    Medical Decision Making       40 MINUTES SPENT BY ME on the date of service doing chart review, history, exam, documentation & further activities per the note.      Data     I have personally reviewed the following data over the past 24 hrs:    9.9  \   11.7   / 298     135 98 33.0 (H) /  191 (H)   4.6 20 (L) 1.64 (H) \     ALT: 26 AST: 19 AP: 88 TBILI: 0.3   ALB: 3.6 TOT PROTEIN: 6.7 LIPASE: N/A     TSH: 1.65 T4: N/A A1C: N/A     Procal: N/A CRP: N/A Lactic Acid: 1.4

## 2023-10-12 NOTE — PLAN OF CARE
"A&Ox4. /86 (BP Location: Left arm)   Pulse 97   Temp 97.6  F (36.4  C) (Axillary)   Resp 16   Ht 1.702 m (5' 7\")   Wt 123 kg (271 lb 1.6 oz)   SpO2 93%   BMI 42.46 kg/m   Tele afib BBB 's. C/o back pain. Ambulating helped. Declined medications. Up with A1 walker and GB. Purewick in place. Pt has baseline incontinence. Abdominal fold excoriated, red with linear open area. Diuresing with IV Lasix. Plan for echo and PT.   "

## 2023-10-12 NOTE — PLAN OF CARE
VSS, lasix changed to PO. Remains in afib, well controlled. Up with 1, dyspnea much improved today. RA-88%  placed on 2L at 96%. Voiding up to restroom, occasionally incontinent.

## 2023-10-12 NOTE — PLAN OF CARE
Neuro- A&Ox4  Most Recent Vitals- Temp: 97.6  F (36.4  C) Temp src: Axillary BP: (!) 118/103 Pulse: 99   Resp: 16 SpO2: 98 % O2 Device: BiPAP/CPAP   Tele/Cardiac- A fib CVR/RVR, denies CP  Resp- Weaned down to 1.5L via NC, gets very [place], LS diminished   Activity- Ax1 gb/w  Pain- denies  Drips- none, SL  Drains/Tubes- PIV, purewick  Skin- +1-2 BLE, scattered bruising  GI/- WDL  Aggression Color- Green  COVID status- Negative  Plan- Echo in AM  Misc- NA    Esther Whittaker, RN

## 2023-10-12 NOTE — PROGRESS NOTES
[facility] Cardiology Progress Note  Date of Service: 10/12/2023  Staff Cardiologist: Dr. Infante     Assessment & Plan   Moira Andre is a 89 year old female who was admitted on 10/10/2023 with congestive heart failure found to be in Afib with RVR.      Interval History:   Rates improved 's, remains in Afib. Reports improved breathing, though still requiring supplemental O2. She is hemodynamically stable. Adequate diuresis with IV lasix, net negative 1.6L yesterday. Weight unchanged. Volume status difficult to assess, though appears relatively euvolemic. Cr 1.6 (1.59), GFR 29. K 4.6.     Assessment:   Atrial fibrillation with RVR  - New diagnosis this admission  - CHADSs2-VASc score of 7 (age++, female+, CHF+, HTN+, CAD+, DM+)  - On apixaban 5 mg BID for stroke prophylaxis.   - Rate control: Toprol  mg BID [PTA Toprol  mg daily]      Acute congestive heart failure, NYHA class II symptoms   - NTpBNP 4629, CXR c/w congestion   - Volume status: appears euvolemic on exam, though difficult to assess due to body habitus s/p IV intermittent IV lasix   - Echo (2020) with LVEF 60-65%, no significant valvular disease   - Etiology likely tachy-mediated in the setting of AFib RVR     Acute hypoxic respiratory failure 2/2 to #2  Hypertension, adequately controlled   CKD stage IIIb with baseline cr 1.4-1.6  CAD with remote hx of PCI to OM/diagonal in 2007 on plavix       Plan:  Transition to oral lasix 20 mg BID.   Obtain echocardiogram.   Wean oxygen, as able.   Recommend PT/OT and IS use.   Continue apixaban 5 mg BID in addition to plavix 75 mg daily.   Continue Toprol  mg BID.   Pending echo findings, may consider outpatient DCCV once fully anticoagulated.   Will continue to follow.     Bebe Llamas, CNP  Pager:  [phone]  (7am - 5pm, M-F)      Physical Exam   Temp: 97.7  F (36.5  C) Temp src: Oral BP: 114/82 Pulse: 107   Resp: 16 SpO2: 94 % O2 Device:  Nasal cannula Oxygen Delivery: 2 LPM  Vitals:    10/10/23 2358 10/12/23 0439   Weight: 123.5 kg (272 lb 3.2 oz) 123 kg (271 lb 1.6 oz)       Constitutional:   NAD   Skin:   Warm and dry   Head:   Nontraumatic   Neck:   no JVD   Lungs:   normal   Cardiovascular:   irregularly irregular rhythm   Abdomen:   Benign   Extremities and Back:   1+ BASSEM   Neurological:   Grossly nonfocal       Medications    - MEDICATION INSTRUCTIONS -        acetaminophen  1,000 mg Oral BID    apixaban ANTICOAGULANT  2.5 mg Oral BID    atorvastatin  20 mg Oral QPM    buPROPion  100 mg Oral QPM    clopidogrel  75 mg Oral QPM    DULoxetine  60 mg Oral QPM    fexofenadine  180 mg Oral QPM    fluticasone-vilanterol  1 puff Inhalation Daily    hydrALAZINE  25 mg Oral BID    insulin aspart  1-3 Units Subcutaneous TID AC    insulin aspart  1-3 Units Subcutaneous At Bedtime    metoprolol succinate ER  100 mg Oral BID    miconazole   Topical BID    pantoprazole  20 mg Oral Daily       Data     Most Recent 3 CBC's:  Recent Labs   Lab Test 10/11/23  1104 10/10/23  2154 10/10/23  1438   WBC 9.9 11.5* 10.1   HGB 11.7 12.0 12.6   MCV 82 82 82    291 285     Most Recent 3 BMP's:  Recent Labs   Lab Test 10/12/23  0717 10/12/23  0627 10/12/23  0135 10/11/23  1144 10/11/23  1104 10/11/23  0023 10/10/23  2154 10/10/23  1635 10/10/23  1438   NA  --  135  --   --  136  --   --   --  135   POTASSIUM  --  4.6  --   --  5.0  --  4.8   < > 5.8*   CHLORIDE  --  98  --   --  102  --   --   --  102   CO2  --  20*  --   --  22  --   --   --  18*   BUN  --  33.0*  --   --  32.0*  --   --   --  33.2*   CR  --  1.64*  --   --  1.59*  --   --   --  1.42*   ANIONGAP  --  17*  --   --  12  --   --   --  15   TELLY  --  9.4  --   --  9.4  --   --   --  9.3   * 215* 146*   < > 175*   < >  --   --  189*    < > = values in this interval not displayed.     Most Recent 3 Creatinines:  Recent Labs   Lab Test 10/12/23  0627 10/11/23  1104 10/10/23  1438   CR 1.64* 1.59*  "1.42*     Most Recent 3 BNP's:  Recent Labs   Lab Test 10/10/23  1438 04/19/23  1903 02/16/22  1750   NTBNPI 4,629* 1,163 861         Medical Decision Making       45 MINUTES SPENT BY ME on the date of service doing chart review, history, exam, documentation & further activities per the note.        Clinically Significant Risk Factors        # Hyperkalemia: Highest K = 5.8 mmol/L in last 2 days, will monitor as appropriate           # Hypertension: Noted on problem list       # DMII: A1C = N/A within past 6 months, PRESENT ON ADMISSION  # Severe Obesity: Estimated body mass index is 42.46 kg/m  as calculated from the following:    Height as of this encounter: 1.702 m (5' 7\").    Weight as of this encounter: 123 kg (271 lb 1.6 oz)., PRESENT ON ADMISSION     # Asthma: noted on problem list         "

## 2023-10-13 ENCOUNTER — APPOINTMENT (OUTPATIENT)
Dept: PHYSICAL THERAPY | Facility: CLINIC | Age: 88
DRG: 291 | End: 2023-10-13
Payer: COMMERCIAL

## 2023-10-13 ENCOUNTER — TELEPHONE (OUTPATIENT)
Dept: FAMILY MEDICINE | Facility: CLINIC | Age: 88
End: 2023-10-13
Payer: COMMERCIAL

## 2023-10-13 VITALS
TEMPERATURE: 97.7 F | HEIGHT: 67 IN | HEART RATE: 84 BPM | WEIGHT: 267.5 LBS | OXYGEN SATURATION: 96 % | BODY MASS INDEX: 41.99 KG/M2 | RESPIRATION RATE: 18 BRPM | DIASTOLIC BLOOD PRESSURE: 91 MMHG | SYSTOLIC BLOOD PRESSURE: 121 MMHG

## 2023-10-13 LAB
ANION GAP SERPL CALCULATED.3IONS-SCNC: 16 MMOL/L (ref 7–15)
BUN SERPL-MCNC: 35.3 MG/DL (ref 8–23)
CALCIUM SERPL-MCNC: 9.7 MG/DL (ref 8.2–9.6)
CHLORIDE SERPL-SCNC: 96 MMOL/L (ref 98–107)
CREAT SERPL-MCNC: 1.78 MG/DL (ref 0.51–0.95)
DEPRECATED HCO3 PLAS-SCNC: 24 MMOL/L (ref 22–29)
EGFRCR SERPLBLD CKD-EPI 2021: 27 ML/MIN/1.73M2
GLUCOSE BLDC GLUCOMTR-MCNC: 138 MG/DL (ref 70–99)
GLUCOSE BLDC GLUCOMTR-MCNC: 144 MG/DL (ref 70–99)
GLUCOSE BLDC GLUCOMTR-MCNC: 172 MG/DL (ref 70–99)
GLUCOSE SERPL-MCNC: 163 MG/DL (ref 70–99)
MAGNESIUM SERPL-MCNC: 1.8 MG/DL (ref 1.7–2.3)
POTASSIUM SERPL-SCNC: 4.7 MMOL/L (ref 3.4–5.3)
SODIUM SERPL-SCNC: 136 MMOL/L (ref 135–145)

## 2023-10-13 PROCEDURE — 80048 BASIC METABOLIC PNL TOTAL CA: CPT | Performed by: HOSPITALIST

## 2023-10-13 PROCEDURE — 83735 ASSAY OF MAGNESIUM: CPT | Performed by: HOSPITALIST

## 2023-10-13 PROCEDURE — 97530 THERAPEUTIC ACTIVITIES: CPT | Mod: GP

## 2023-10-13 PROCEDURE — 99239 HOSP IP/OBS DSCHRG MGMT >30: CPT | Performed by: HOSPITALIST

## 2023-10-13 PROCEDURE — 250N000013 HC RX MED GY IP 250 OP 250 PS 637: Performed by: NURSE PRACTITIONER

## 2023-10-13 PROCEDURE — 250N000013 HC RX MED GY IP 250 OP 250 PS 637: Performed by: STUDENT IN AN ORGANIZED HEALTH CARE EDUCATION/TRAINING PROGRAM

## 2023-10-13 PROCEDURE — 99232 SBSQ HOSP IP/OBS MODERATE 35: CPT | Mod: FS | Performed by: NURSE PRACTITIONER

## 2023-10-13 PROCEDURE — 36415 COLL VENOUS BLD VENIPUNCTURE: CPT | Performed by: HOSPITALIST

## 2023-10-13 RX ORDER — METOPROLOL SUCCINATE 100 MG/1
100 TABLET, EXTENDED RELEASE ORAL 2 TIMES DAILY
Qty: 60 TABLET | Refills: 0 | Status: SHIPPED | OUTPATIENT
Start: 2023-10-13 | End: 2023-10-26

## 2023-10-13 RX ORDER — ALBUTEROL SULFATE 90 UG/1
2 AEROSOL, METERED RESPIRATORY (INHALATION) EVERY 6 HOURS PRN
COMMUNITY
Start: 2023-10-13

## 2023-10-13 RX ORDER — FUROSEMIDE 20 MG
20 TABLET ORAL DAILY
Status: DISCONTINUED | OUTPATIENT
Start: 2023-10-14 | End: 2023-10-13 | Stop reason: HOSPADM

## 2023-10-13 RX ORDER — FUROSEMIDE 20 MG
20 TABLET ORAL DAILY
Qty: 30 TABLET | Refills: 0 | Status: SHIPPED | OUTPATIENT
Start: 2023-10-14 | End: 2023-10-26

## 2023-10-13 RX ADMIN — ACETAMINOPHEN 1000 MG: 500 TABLET, FILM COATED ORAL at 08:46

## 2023-10-13 RX ADMIN — APIXABAN 2.5 MG: 2.5 TABLET, FILM COATED ORAL at 08:45

## 2023-10-13 RX ADMIN — METOPROLOL SUCCINATE 100 MG: 100 TABLET, EXTENDED RELEASE ORAL at 08:45

## 2023-10-13 RX ADMIN — Medication 1 MG: at 00:20

## 2023-10-13 RX ADMIN — INSULIN ASPART 1 UNITS: 100 INJECTION, SOLUTION INTRAVENOUS; SUBCUTANEOUS at 12:45

## 2023-10-13 RX ADMIN — HYDRALAZINE HYDROCHLORIDE 25 MG: 25 TABLET ORAL at 08:46

## 2023-10-13 RX ADMIN — INSULIN ASPART 1 UNITS: 100 INJECTION, SOLUTION INTRAVENOUS; SUBCUTANEOUS at 08:44

## 2023-10-13 RX ADMIN — FLUTICASONE PROPIONATE 1 SPRAY: 50 SPRAY, METERED NASAL at 08:50

## 2023-10-13 RX ADMIN — PANTOPRAZOLE SODIUM 20 MG: 20 TABLET, DELAYED RELEASE ORAL at 08:46

## 2023-10-13 RX ADMIN — FLUTICASONE FUROATE AND VILANTEROL TRIFENATATE 1 PUFF: 100; 25 POWDER RESPIRATORY (INHALATION) at 08:50

## 2023-10-13 RX ADMIN — MICONAZOLE NITRATE: 2 POWDER TOPICAL at 08:52

## 2023-10-13 RX ADMIN — FUROSEMIDE 20 MG: 20 TABLET ORAL at 08:45

## 2023-10-13 ASSESSMENT — ACTIVITIES OF DAILY LIVING (ADL)
ADLS_ACUITY_SCORE: 36

## 2023-10-13 NOTE — PROGRESS NOTES
Physical Therapy Discharge Summary    Reason for therapy discharge:    Discharged to home.    Progress towards therapy goal(s). See goals on Care Plan in [facility] electronic health record for goal details.  Goals partially met.  Barriers to achieving goals:   discharge from facility.    Therapy recommendation(s):    Continued therapy is recommended.  Rationale/Recommendations:  per below.         10/13/23 1073   Appointment Info   Signing Clinician's Name / Credentials (PT) Brittany Dressler, PT   Therapeutic Activity   Therapeutic Activities: dynamic activities to improve functional performance Minutes (10287) 10   Symptoms Noted During/After Treatment Fatigue;Increased pain   Treatment Detail/Skilled Intervention Pt agreeable to PT, on RA: Supine-sit Robert, sit-stands Robert with UE assist to 4WW, B pivots distant supervision. 400' with standing break midway 4WW - supervision with O2 >= 88% with mod SOB. Limited rx by bowel urgency, finished in BR with CNA handoff.   PT Discharge Planning   PT Plan PT: activity tolerance progressions - monitor O2.   PT Discharge Recommendation (DC Rec) home with home care physical therapy;home with assist;Transitional Care Facility   PT Rationale for DC Rec Pt lives in apt alone, below Robert 4WW no falls baseline, may progress to home with assist by DC, following   PT Brief overview of current status Distant supervision 4WW O2 >= 88% on RA   PT Equipment Needed at Discharge   (pt has equipment)   Total Session Time   Timed Code Treatment Minutes 10   Total Session Time (sum of timed and untimed services) 10        40 yo male presenting with difficulty catching his breath since monday.  endorses chest discomfort described as tightness.  currently asymptomatic.  symptoms worse with exertion, improved with rest.  symptoms last occurred when father passed away.  never had stress test or echo.  +heart disease in grandpa.  never smoked.      pcp- андрей espinoza

## 2023-10-13 NOTE — PROGRESS NOTES
Patient acknowledged understanding of discharge education. All belongings were in hand at the time of discharge. All questions were answered.

## 2023-10-13 NOTE — PROGRESS NOTES
[facility] Cardiology Progress Note  Date of Service: 10/13/2023  Staff Cardiologist: Dr. Infante     Assessment & Plan   Moira Andre is a 89 year old female who was admitted on 10/10/2023 with congestive heart failure found to be in Afib with RVR.      Interval History:   Rates remain well controlled. Volume status difficult to assess, though appears euvolemic.  Down 4lbs, adequate output this admission. Cr 1.78 (1.64).     Assessment:   Atrial fibrillation with RVR  - New diagnosis this admission  - CHADSs2-VASc score of 7 (age++, female+, CHF+, HTN+, CAD+, DM+)  - On apixaban 2.5 mg BID for stroke prophylaxis [Renal function and age]  - Rate control: Toprol  mg BID [PTA Toprol  mg daily]      Acute congestive heart failure, NYHA class II symptoms   - NTpBNP 4629, CXR c/w congestion s/p IV lasix   - Volume status: appears euvolemic, on oral lasix 20 mg BID   - Echo shows LVEF 55%, moderate mitral stenosis, moderate aortic stenosis with MG 20 mmHg  - Etiology likely tachy-mediated in the setting of AFib RVR     Acute hypoxic respiratory failure 2/2 to #2  Hypertension, adequately controlled   TIFFANI on CKD stage IIIb with baseline cr 1.4-1.6  CAD with remote hx of PCI to OM/diagonal in 2007 on plavix   Moderate mitral stenosis and moderate aortic stenosis       Plan:  Decrease lasix to 20 mg daily.   Continue apixaban 2.5 mg BID in addition to plavix 75 mg daily.   Continue Toprol  mg BID.   Repeat echo in 1 year for surveillance of valve disease.   Follow-up with cardiology AMY in 2-4 weeks with repeat BMP.   Okay to discharge from cardiology standpoint.     Bebe Llamas, CNP  Pager:  [phone]  (7am - 5pm, M-F)      Physical Exam   Temp: 97.7  F (36.5  C) Temp src: Oral BP: 112/85 Pulse: 89   Resp: 18 SpO2: 92 % O2 Device: None (Room air)    Vitals:    10/10/23 2358 10/12/23 0439 10/13/23 0545   Weight: 123.5 kg (272 lb 3.2 oz) 123 kg (271 lb 1.6 oz)  121.3 kg (267 lb 8 oz)       Constitutional:   NAD   Skin:   Warm and dry   Head:   Nontraumatic   Neck:   no JVD   Lungs:   normal   Cardiovascular:   irregularly irregular rhythm   Abdomen:   Benign   Extremities and Back:   1+ BASSEM   Neurological:   Grossly nonfocal       Medications    - MEDICATION INSTRUCTIONS -        acetaminophen  1,000 mg Oral BID    apixaban ANTICOAGULANT  2.5 mg Oral BID    atorvastatin  20 mg Oral QPM    buPROPion  100 mg Oral QPM    clopidogrel  75 mg Oral QPM    DULoxetine  60 mg Oral QPM    fexofenadine  180 mg Oral QPM    fluticasone-vilanterol  1 puff Inhalation Daily    furosemide  20 mg Oral BID    hydrALAZINE  25 mg Oral BID    insulin aspart  1-3 Units Subcutaneous TID AC    insulin aspart  1-3 Units Subcutaneous At Bedtime    metoprolol succinate ER  100 mg Oral BID    miconazole   Topical BID    pantoprazole  20 mg Oral Daily       Data     Most Recent 3 CBC's:  Recent Labs   Lab Test 10/11/23  1104 10/10/23  2154 10/10/23  1438   WBC 9.9 11.5* 10.1   HGB 11.7 12.0 12.6   MCV 82 82 82    291 285     Most Recent 3 BMP's:  Recent Labs   Lab Test 10/13/23  0708 10/13/23  0630 10/13/23  0158 10/12/23  0717 10/12/23  0627 10/11/23  1144 10/11/23  1104   NA  --  136  --   --  135  --  136   POTASSIUM  --  4.7  --   --  4.6  --  5.0   CHLORIDE  --  96*  --   --  98  --  102   CO2  --  24  --   --  20*  --  22   BUN  --  35.3*  --   --  33.0*  --  32.0*   CR  --  1.78*  --   --  1.64*  --  1.59*   ANIONGAP  --  16*  --   --  17*  --  12   TELLY  --  9.7*  --   --  9.4  --  9.4   * 163* 138*   < > 215*   < > 175*    < > = values in this interval not displayed.     Most Recent 3 Creatinines:  Recent Labs   Lab Test 10/13/23  0630 10/12/23  0627 10/11/23  1104   CR 1.78* 1.64* 1.59*     Most Recent 3 BNP's:  Recent Labs   Lab Test 10/10/23  1438 04/19/23  1903 02/16/22  1750   NTBNPI 4,629* 1,163 861         Medical Decision Making       45 MINUTES SPENT BY ME on the date of  "service doing chart review, history, exam, documentation & further activities per the note.        Clinically Significant Risk Factors                    # Hypertension: Noted on problem list       # DMII: A1C = N/A within past 6 months, PRESENT ON ADMISSION    # Severe Obesity: Estimated body mass index is 41.9 kg/m  as calculated from the following:    Height as of this encounter: 1.702 m (5' 7\").    Weight as of this encounter: 121.3 kg (267 lb 8 oz)., PRESENT ON ADMISSION     # Asthma: noted on problem list         "

## 2023-10-13 NOTE — TELEPHONE ENCOUNTER
TO PCP    LILLI Salazar from [facility] to ask if PCP will follow for home care services. Please advise.    Jossy Hathaway RN on 10/13/2023 at 1:05 PM

## 2023-10-13 NOTE — PLAN OF CARE
Goal Outcome Evaluation:      Plan of Care Reviewed With: patient    Overall Patient Progress: improving    Pt is A&OX4, VSS on RA,tele Afib CVR, denies pain . Up with AX1GB/W, ambulating to bathroom, voiding ok, using purewick overnight, miles cardiac diet, cardio following, PT rec TCU vs home, SW to follow.

## 2023-10-13 NOTE — PLAN OF CARE
VSS. Tele afib CVR. A&O x4. Denies pain. Purewick in place overnight. Ambulated to bathroom with SBA and walker. Dyspnea with exertion, at baseline per patient. C/o constipation, is passing flatus. Stool softener requested. Discharge plan pending.

## 2023-10-13 NOTE — PROGRESS NOTES
Care Management Follow Up    Length of Stay (days): 3    Expected Discharge Date: 10/14/2023     Concerns to be Addressed:       Patient plan of care discussed at interdisciplinary rounds: Yes    Anticipated Discharge Disposition:       Anticipated Discharge Services:    Anticipated Discharge DME:      Patient/family educated on Medicare website which has current facility and service quality ratings:    Education Provided on the Discharge Plan:    Patient/Family in Agreement with the Plan:      Referrals Placed by CM/SW:    Private pay costs discussed: Not applicable    Additional Information:  Current therapy recommendation is for home with homecare. Writer attempted to meet with patient at bedside to determine if patient is agreeable. Patient was not in her room at this time. Writer will attempt at a later time.     FABRICIO Galvan, LGSW   Social Work   [facility]

## 2023-10-13 NOTE — DISCHARGE SUMMARY
"[facility]  Hospitalist Discharge Summary      Date of Admission:  10/10/2023  Date of Discharge:  10/13/2023  Discharging Provider: Nikolas Vang MD  Discharge Service: Hospitalist Service    Discharge Diagnoses   Acute hypoxic respiratory failure  Atrial fibrillation with RVR, new diagnosis  Pulmonary edema  Acute on chronic congestive heart failure with preserved ejection fraction  Coronary artery disease  Elevated troponin  Moderate mitral stenosis  Moderate aortic stenosis  Hypertension  Hyperlipidemia  Asthma  Chronic kidney disease, stage IIIb  Diabetes mellitus, type 2  Mild hyperkalemia  ELIGIO  Depression    Clinically Significant Risk Factors     # DMII: A1C = N/A within past 6 months    # Severe Obesity: Estimated body mass index is 41.9 kg/m  as calculated from the following:    Height as of this encounter: 1.702 m (5' 7\").    Weight as of this encounter: 121.3 kg (267 lb 8 oz).       Follow-ups Needed After Discharge   Follow-up Appointments     Follow-up and recommended labs and tests       Follow up with primary care provider, Maryan Churchill, within 7 days for   hospital follow- up.  The following labs/tests are recommended: BMP.            Discharge Disposition   Discharged to home  Condition at discharge: Stable    Hospital Course   Moira Andre is a 89 year old female admitted on 10/10/2023. She has a history of type 2 diabetes mellitus with diabetic nephropathy, chronic kidney disease stage III, spinal stenosis of the lumbosacral region, depression, ventral hernia, obstructive apnea on CPAP, asthma, and hypertension. Evaluation in the ER revealed atrial fibrillation with rapid ventricular response an concern for pulmonary edema. She was started on supplemental oxygen and given IV lasix. The hospitalist service was contacted to admit her for further evaluation and management.    Acute hypoxic respiratory failure  Atrial fibrillation with RVR, new " diagnosis  Pulmonary edema  Acute on chronic congestive heart failure with preserved ejection fraction  Presented with worsening shortness of breath and fatigue. Had some mild symptoms through most of the summer, became worse about 2 days prior to admission. Increased lower extremity edema. No chest pain or palpitations. Noted to be hypoxic and tachycardic in the ER. EKG with atrial fibrillation with RVR. CXR showed pulmonary vascular congestion, mild interstitial edema, cardiomegaly. Troponin mildly elevated at 25 --> 27. Started on supplemental oxygen and IV lasix, then admitted to the hospitalist service.  Admitted to inpatient.  Cardiology consulted, appreciate their assistance.  Monitor telemetry.   Continue PTA Toprol XL, dose increased during this admission with improvement in rate control.  Good response to IV lasix. Transitioned to oral lasix on 10/12, dose decreased on 10/13 due to bump in creatinine.  Monitor I&O and daily weights. Weight was 267 pounds on the day of discharge from the hospital  [id number] is 5. Risks/benefits of anticoagulation were discussed with patient by admitted hospitalist, patient agreed with anticoagulation.  Initially started on IV heparin, transitioned to oral Eliquis on 10/11/23.  Weaned off supplemental oxygen prior to discharge.  TTE as noted below.  Bilateral lower extremity Doppler ultrasound negative for DVT on 10/10/23.  TSH and magnesium within normal limits. Hyperkalemia resolved.  PT consulted.  Overall her symptoms have improved significantly. She denies any shortness of breath. Ambulating with her walker. Tolerating oral intake. Feels ready for discharge home.  Discharge home today.  Follow-up with PCP and cardiology as noted below.  Monitor weights at home and contact your doctor if your weight goes up by more than 2 pounds in a day or 5 pounds in a week.  Discussed reasons to seek medical attention prior to follow up appointments.  Offered to update her family  today, she stated that wouldn't be necessary.    Coronary artery disease  Elevated troponin  Moderate mitral stenosis  Moderate aortic stenosis  Hypertension  Hyperlipidemia  Prior history of BMS x 2 to OM and diagonal in 2007. Most recent angiogram in 2017 did not reveal any significant obstructive disease.  Continue PTA plavix, Toprol XL, hydralazine, and atorvastatin.  Toprol XL dose increased during this admission as noted above.  TTE similar to previous, LVEF 55%, moderate MS and AS - see report in Epic.  Troponin flat on recheck, suspect due to atrial fibrillation with RVR, CHF.  Cardiology consulted as noted above.    Asthma  Denies any significant upper respiratory symptoms suggestive of infection. No wheezes noted. Suspect her respiratory complaints are due to pulmonary edema/CHF as noted above.  COVID-19, Influenza A/B, and RSV PCR testing negative on 10/10/23.  Continue PTA allerga and inhalers/formulary substitutes.  PRN xopenex nebulizer available.    Chronic kidney disease, stage IIIb  Baseline creatinine between 1.4-1.6. Creatinine stable and at baseline in the ER.  Creatinine 1.42 on day of admission, trended up to 1.78 on day of discharge.  Diuretic dosing decreased as noted above.  Recheck renal function at outpatient follow up.    Diabetes mellitus, type 2  Hemoglobin A1c 6.5% on 6/26/23.  PTA was on glimepiride and semaglutide.  Held PTA medications in the hospital, will resume upon discharge.    Mild hyperkalemia  Resolved after IV lasix.    ELIGIO  CPAP per home settings.  Her home CPAP mask broke and she has not been using CPAP for the past 6 months or so. Untreated ELIGIO could be contributing to cardiac issues noted above. Discussed importance of appropriate treatment of ELIGIO.  Will need to follow-up with her PCP and consider outpatient sleep evaluation to get restarted on CPAP.    Depression  Continue PTA wellbutrin and duloxetine.    Consultations This Hospital Stay   CARDIOLOGY IP  CONSULT  PHARMACY IP CONSULT  PHYSICAL THERAPY ADULT IP CONSULT  PHARMACY LIAISON FOR MEDICATION COVERAGE CONSULT    Code Status   Full Code    Time Spent on this Encounter   I, Nikolas Vang MD, personally saw the patient today and spent greater than 30 minutes discharging this patient.       Nikolas Vang MD  [facility]  [facility] [address]  [place] [address]  Phone: [phone]  ______________________________________________________________________    Physical Exam   Vital Signs: Temp: 97.7  F (36.5  C) Temp src: Oral BP: (!) 121/91 Pulse: 84   Resp: 18 SpO2: 96 % O2 Device: None (Room air)    Weight: 267 lbs 8 oz  Constitutional: awake, alert, cooperative, no apparent distress, laying in the hospital bed  Respiratory: no increased work of breathing, clear to auscultation  Cardiovascular: irregular rhythm, normal rate, normal S1 and S2, systolic murmur  GI: normal bowel sounds, soft, non-distended, non-tender  Skin: warm, dry  Musculoskeletal: 1-2+ lower extremity pitting edema present  Neurologic: awake, alert, answers questions appropriately, moves all extremities       Primary Care Physician   Maryan Churchill    Discharge Orders      Basic metabolic panel     Follow-Up with Cardiology AMY      Home Care Referral      Reason for your hospital stay    Atrial fibrillation with rapid ventricular response.  Acute on chronic heart failure with preserved ejection fraction.     Follow-up and recommended labs and tests     Follow up with primary care provider, Maryan Churchill, within 7 days for hospital follow- up.  The following labs/tests are recommended: BMP.     Activity    Your activity upon discharge: activity as tolerated with walker     Monitor and record    weight every day. Call your doctor if your weight goes up by more than 2 pounds in a day or 5 pounds in a week.     Diet    Follow this diet upon discharge: Low Saturated Fat Na <2400 mg        Significant Results and Procedures   Most Recent 3 CBC's:  Recent Labs   Lab Test 10/11/23  1104 10/10/23  2154 10/10/23  1438   WBC 9.9 11.5* 10.1   HGB 11.7 12.0 12.6   MCV 82 82 82    291 285     Most Recent 3 BMP's:  Recent Labs   Lab Test 10/13/23  1232 10/13/23  0708 10/13/23  0630 10/12/23  0717 10/12/23  0627 10/11/23  1144 10/11/23  1104   NA  --   --  136  --  135  --  136   POTASSIUM  --   --  4.7  --  4.6  --  5.0   CHLORIDE  --   --  96*  --  98  --  102   CO2  --   --  24  --  20*  --  22   BUN  --   --  35.3*  --  33.0*  --  32.0*   CR  --   --  1.78*  --  1.64*  --  1.59*   ANIONGAP  --   --  16*  --  17*  --  12   TELLY  --   --  9.7*  --  9.4  --  9.4   * 172* 163*   < > 215*   < > 175*    < > = values in this interval not displayed.     Most Recent 2 LFT's:  Recent Labs   Lab Test 10/11/23  1104 10/10/23  1438 04/19/23  1903   AST 19  --  14   ALT 26  --  7*   ALKPHOS 88  --  76   BILITOTAL 0.3 0.4 0.3     Results for orders placed or performed during the hospital encounter of 10/10/23   XR Chest 2 Views    Narrative    CHEST TWO VIEWS  10/10/2023 2:56 PM       INDICATION: Shortness of breath.    COMPARISON: 4/19/2023       Impression    IMPRESSION: New trace bilateral pleural effusions. Cardiomegaly with  pulmonary vascular congestion. Increased interstitial markings likely  represent mild interstitial edema. No evidence of pneumonia.    KASSANDRA SCHILLING MD         SYSTEM ID:  [id number]   US Lower Extremity Venous Duplex Right    Narrative    VENOUS ULTRASOUND RIGHT LEG  10/10/2023 3:19 PM     HISTORY: leg swelling    COMPARISON: None.    FINDINGS:  Examination of the deep veins with graded compression and  color flow Doppler with spectral wave form analysis was performed.   There is no evidence for DVT in the right lower extremity. Only one of  the paired peroneal veins is visualized.      Impression    IMPRESSION: No evidence of deep venous thrombosis.    DAE HONG MD          SYSTEM ID:  [id number]   Echocardiogram Complete     Value    LVEF  55%    [facility]    [id number]  [id number]  [id number]  [id number]^LYDIA^MARK^NUHA     [facility]  Echocardiography Laboratory  [address] [place] [address], [place] [address]     Name: JOSIE ARRIAGA  MRN: [id number]  : 1933  Study Date: 10/12/2023 10:24 AM  Age: 89 yrs  Gender: Female  Patient Location: [facility]  Reason For Study: Atrial Fibrillation  Ordering Physician: MARK FREEMAN  Performed By: Danika Rivas     BSA: 2.3 m2  Height: 67 in  Weight: 272 lb  BP: 122/78 mmHg  ______________________________________________________________________________  Procedure  Complete Portable Echo Adult. Optison (NDC #[id number]) given intravenously.  ______________________________________________________________________________  Interpretation Summary     1. The left ventricle is normal in structure, function and size. The visual  ejection fraction is estimated at 55%.  2. The right ventricle is normal in structure, function and size.  3. There is moderate mitral stenosis. The mean mitral valve gradient is 6mmHg.  4. Moderate valvular aortic stenosis. Mean 20mmHg, Vmax 2.7m/s, MEL 1.2cm2, DI  0.40.     Echo 2020 showed EF 60%, MV mean 7mmHg, AS with mean 10mmHg, Vmax 2.0m/s.  ______________________________________________________________________________  Left Ventricle  The left ventricle is normal in structure, function and size. There is normal  left ventricular wall thickness. The visual ejection fraction is estimated at  55%. Diastolic function not assessed due to atrial fibrillation. Normal left  ventricular wall motion.     Right Ventricle  The right ventricle is normal in structure, function and size.     Atria  The left atrium is mildly dilated. Right atrial size is normal. There is no  atrial shunt seen.     Mitral Valve  There is moderate mitral annular calcification. There is no mitral  regurgitation noted. There is moderate  mitral stenosis. The mean mitral valve  gradient is 6mmHg.     Tricuspid Valve  No tricuspid regurgitation.     Aortic Valve  Moderate valvular aortic stenosis. Mean 20mmHg, Vmax 2.7m/s, MEL 1.2cm2, DI  0.40.     Pulmonic Valve  The pulmonic valve is normal in structure and function.     Vessels  Normal ascending, transverse (arch), and descending aorta. The inferior vena  cava was normal in size with preserved respiratory variability.     Pericardium  There is no pericardial effusion.     Rhythm  The rhythm was atrial fibrillation.  ______________________________________________________________________________  MMode/2D Measurements & Calculations  IVSd: 1.2 cm  LVIDd: 3.6 cm  LVIDs: 2.7 cm  LVPWd: 1.2 cm  FS: 25.9 %  LV mass(C)d: 138.1 grams  LV mass(C)dI: 59.9 grams/m2  Ao root diam: 3.1 cm  LA dimension: 5.1 cm  asc Aorta Diam: 3.5 cm  LA/Ao: 1.7  LVOT diam: 2.0 cm  LVOT area: 3.3 cm2  Ao root diam index Ht(cm/m): 1.8  Ao root diam index BSA (cm/m2): 1.3  Asc Ao diam index BSA (cm/m2): 1.5  Asc Ao diam index Ht(cm/m): 2.0  LA Volume (BP): 91.0 ml     LA Volume Index (BP): 39.6 ml/m2  RV Base: 5.1 cm  RWT: 0.66  TAPSE: 1.8 cm     Doppler Measurements & Calculations  MV E max julian: 172.0 cm/sec  MV max P.7 mmHg  MV mean P.0 mmHg  MV V2 VTI: 35.3 cm  MVA(VTI): 1.9 cm2  MV dec slope: 603.4 cm/sec2  MV dec time: 0.32 sec  Ao V2 max: 273.1 cm/sec  Ao max P.0 mmHg  Ao V2 mean: 210.0 cm/sec  Ao mean P.8 mmHg  Ao V2 VTI: 59.6 cm  MEL(I,D): 1.1 cm2  MEL(V,D): 1.3 cm2  LV V1 max P.9 mmHg  LV V1 max: 110.0 cm/sec  LV V1 VTI: 20.0 cm  SV(LVOT): 65.4 ml  SI(LVOT): 28.4 ml/m2  PA acc time: 0.12 sec     AV Julian Ratio (DI): 0.40  MEL Index (cm2/m2): 0.48  E/E' av.1  Lateral E/e': 19.6  Medial E/e': 22.6  RV S Julian: 10.4 cm/sec     ______________________________________________________________________________  Report approved by: Bere Lindsay 10/12/2023 02:50 PM           Discharge Medications    Current Discharge Medication List        START taking these medications    Details   apixaban ANTICOAGULANT (ELIQUIS) 2.5 MG tablet Take 1 tablet (2.5 mg) by mouth 2 times daily  Qty: 60 tablet, Refills: 0    Associated Diagnoses: Atrial fibrillation with RVR (H)      furosemide (LASIX) 20 MG tablet Take 1 tablet (20 mg) by mouth daily  Qty: 30 tablet, Refills: 0    Associated Diagnoses: NYHA class 3 heart failure with preserved ejection fraction (H)           CONTINUE these medications which have CHANGED    Details   albuterol (PROAIR HFA/PROVENTIL HFA/VENTOLIN HFA) 108 (90 Base) MCG/ACT inhaler Inhale 2 puffs into the lungs every 6 hours as needed for shortness of breath, wheezing or cough For shortness of breath    Comments: Pharmacy may dispense brand covered by insurance (Proair, or proventil or ventolin or generic albuterol inhaler)  Associated Diagnoses: SOB (shortness of breath)      metoprolol succinate ER (TOPROL XL) 100 MG 24 hr tablet Take 1 tablet (100 mg) by mouth 2 times daily  Qty: 60 tablet, Refills: 0    Associated Diagnoses: Atrial fibrillation with RVR (H)           CONTINUE these medications which have NOT CHANGED    Details   acetaminophen (TYLENOL) 500 MG tablet Take 1,000 mg by mouth 2 times daily      atorvastatin (LIPITOR) 20 MG tablet TAKE 1 TABLET BY MOUTH DAILY FOR CHOLESTEROL  Qty: 100 tablet, Refills: 2    Comments: Please send a replace/new response with 100-Day Supply if appropriate to maximize member benefit. Requesting 1 year supply.  Associated Diagnoses: Hyperlipidemia LDL goal <70      buPROPion (WELLBUTRIN SR) 100 MG 12 hr tablet Take 1 tablet (100 mg) by mouth daily for mood  Qty: 90 tablet, Refills: 3    Comments: Profile Rx: patient will contact pharmacy when needed  Associated Diagnoses: Moderate major depression (H)      calcium carbonate (TUMS) 500 MG chewable tablet Take 1 chew tab by mouth 2 times daily as needed for heartburn      camphor-menthol-methyl salicylate  3.1-6-10 % PTCH Externally apply 1 patch topically as needed (hand pain)      clopidogrel (PLAVIX) 75 MG tablet TAKE 1 TABLET BY MOUTH DAILY  Qty: 100 tablet, Refills: 1    Comments: Please send a replace/new response with 100-Day Supply if appropriate to maximize member benefit. Requesting 1 year supply.  Associated Diagnoses: Coronary artery disease involving native coronary artery of native heart without angina pectoris      DULoxetine (CYMBALTA) 60 MG capsule Take 1 capsule (60 mg) by mouth daily  Qty: 90 capsule, Refills: 3    Comments: Profile Rx: patient will contact pharmacy when needed  Associated Diagnoses: Moderate major depression (H)      fexofenadine (ALLEGRA) 180 MG tablet Take 180 mg by mouth every evening      fluticasone (FLONASE) 50 MCG/ACT nasal spray Spray 1 spray into both nostrils daily as needed       fluticasone-salmeterol (WIXELA INHUB) 100-50 MCG/ACT inhaler USE 1 INHALATION BY MOUTH EVERY  12 HOURS  Qty: 180 each, Refills: 3    Associated Diagnoses: Mild intermittent asthma without complication      hydrALAZINE (APRESOLINE) 25 MG tablet TAKE 1 TABLET BY MOUTH TWICE  DAILY FOR BLOOD PRESSURE  Qty: 200 tablet, Refills: 2    Comments: Requesting 1 year supply  Associated Diagnoses: Essential hypertension      IBANdronate (BONIVA) 150 MG tablet TAKE 1 TABLET BY MOUTH  EVERY 30 DAYS FOR  OSTEOPENIA  Qty: 3 tablet, Refills: 3    Comments: Requesting 1 year supply  Associated Diagnoses: Osteopenia with high risk of fracture      Menthol (Topical Analgesic) 7.5 % (Roll) MISC Apply to affected area every morning      Multiple Vitamins-Minerals (HAIR SKIN AND NAILS FORMULA PO) Take 1 tablet by mouth daily      nitroGLYcerin (NITROSTAT) 0.4 MG sublingual tablet FOR CHEST PAIN PLACE 1 TABLET  UNDER TONGUE EVERY 5 MINUTES FOR 3 DOSES. IF SYMPTOMS PERSIST 5  MINUTES AFTER 1ST DOSE CALL 911  Qty: 25 tablet, Refills: 0    Associated Diagnoses: Chest pain, unspecified type      pantoprazole (PROTONIX) 20  MG EC tablet Take 1 tablet (20 mg) by mouth daily  Qty: 90 tablet, Refills: 3    Associated Diagnoses: Gastroesophageal reflux disease without esophagitis      triamcinolone (KENALOG) 0.1 % external cream Apply topically 2 times daily  Qty: 85 g, Refills: 1    Associated Diagnoses: Rash      Vitamin D3 (CHOLECALCIFEROL) 25 mcg (1000 units) tablet Take 100 Units by mouth daily      glimepiride (AMARYL) 2 MG tablet TAKE 1 TABLET BY MOUTH IN  THE MORNING BEFORE  BREAKFAST FOR DIABETES  Qty: 100 tablet, Refills: 2    Comments: Requesting 1 year supply  Associated Diagnoses: Type 2 diabetes mellitus with diabetic nephropathy, without long-term current use of insulin (H)      Semaglutide (RYBELSUS) 7 MG tablet Take 1 tablet (7 mg) by mouth daily  Qty: 90 tablet, Refills: 1    Comments: Dose is increased  Associated Diagnoses: Type 2 diabetes mellitus with diabetic nephropathy, without long-term current use of insulin (H)           Allergies   Allergies   Allergen Reactions    Aspirin Hives     Reaction occurred during childhood.     Lisinopril Cough    Losartan      Hyperkalemia      Metformin      Elevated lactic acid    Minocycline      Yellow Dye Allergy. Minocycline has Yellow Dye #10.    Mounjaro [Tirzepatide] Diarrhea and GI Disturbance    Salicylates Hives    Yellow Dye Hives     Rxn to yellow tablet. Eyes swelled shut.     Yellow Dyes (Non-Tartrazine) Hives

## 2023-10-13 NOTE — PROGRESS NOTES
Care Management Discharge Note    Discharge Date: 10/13/2023       Discharge Disposition:      Discharge Services:      Discharge DME:      Discharge Transportation: car, drives self, family or friend will provide    Private pay costs discussed: Not applicable    Does the patient's insurance plan have a 3 day qualifying hospital stay waiver?  Yes     Which insurance plan 3 day waiver is available? Alternative insurance waiver    Will the waiver be used for post-acute placement? No    PAS Confirmation Code:    Patient/family educated on Medicare website which has current facility and service quality ratings:      Education Provided on the Discharge Plan:    Persons Notified of Discharge Plans: patient, bedside RN  Patient/Family in Agreement with the Plan:  yes    Handoff Referral Completed: Yes    Additional Information:  Pt to discharge with homecare thru [facility].  Follow up arranged with PCP and cardiology.     Kandi Murphy RN Care Coordinator  [facility]  [phone]

## 2023-10-16 ENCOUNTER — PATIENT OUTREACH (OUTPATIENT)
Dept: CARE COORDINATION | Facility: CLINIC | Age: 88
End: 2023-10-16
Payer: COMMERCIAL

## 2023-10-16 ENCOUNTER — MEDICAL CORRESPONDENCE (OUTPATIENT)
Dept: HEALTH INFORMATION MANAGEMENT | Facility: CLINIC | Age: 88
End: 2023-10-16

## 2023-10-16 ASSESSMENT — ACTIVITIES OF DAILY LIVING (ADL): DEPENDENT_IADLS:: INDEPENDENT

## 2023-10-16 NOTE — PROGRESS NOTES
Clinic Care Coordination Contact  [facility]: Post-Discharge Note  SITUATION                                                      Admission:    Admission Date: 10/10/23   Reason for Admission: SOB  Discharge:   Discharge Date: 10/13/23  Discharge Diagnosis: Acute hypoxic respiratory failure  Atrial fibrillation with RVR, new diagnosis  Pulmonary edema  Acute on chronic congestive heart failure with preserved ejection fraction  Coronary artery disease  Elevated troponin  Moderate mitral stenosis  Moderate aortic stenosis  Hypertension  Hyperlipidemia  Asthma  Chronic kidney disease, stage IIIb  Diabetes mellitus, type 2  Mild hyperkalemia    BACKGROUND                                                      Per hospital discharge summary and inpatient provider notes:  CPAP per home settings.  Her home CPAP mask broke and she has not been using CPAP for the past 6 months or so. Untreated ELIGIO could be contributing to cardiac issues noted above. Discussed importance of appropriate treatment of ELIGIO.  Will need to follow-up with her PCP and consider outpatient sleep evaluation to get restarted on CPAP.  ASSESSMENT      SW spoke with pt.  Pt states she didn't realize that she had anything wrong with her heart when she went to the hospital.  Pt states she has been reading the materials she received from the doctors, and is learning a lot about the heart symptoms she has.  Pt states she will be probably needing to restart her CPAP and she is going to be seeing her PCP and she is learning how to check her weights too.  Pt states she just saw a homecare nurse and the nurse was very pleasant and helpful and pt feels that she is managing well at home and will be able to get any questions she has answered by calling either [facility] or [place] PCP.         Discharge Assessment  How are you doing now that you are home?: Much better  How are your symptoms? (Red Flag symptoms escalate to triage hotline per guidelines):  Improved  Do you feel your condition is stable enough to be safe at home until your provider visit?: Yes  Does the patient have their discharge instructions? : Yes  Does the patient have questions regarding their discharge instructions? : No  Do you have questions regarding any of your medications? : No  Do you have all of your needed medical supplies or equipment (DME)?  (i.e. oxygen tank, CPAP, cane, etc.): Yes  Discharge follow-up appointment scheduled within 14 calendar days? : Yes  Discharge Follow Up Appointment Scheduled with?: Primary Care Provider    Post-op ([facility] CTA Only)  If the patient had a surgery or procedure, do they have any questions for a nurse?: No    Post-op (Clinicians Only)  Did the patient have surgery or a procedure: No  Fever: No  Chills: No  Eating & Drinking: eating and drinking without complaints/concerns  PO Intake: regular diet  Bowel Function: normal  Urinary Status: voiding without complaint/concerns        PLAN                                                      Outpatient Plan:  Pt will be following up with homecare as well as specialists and she is also reading up on fluid retention.      Future Appointments   Date Time Provider Department Center   10/26/2023  1:00 PM Maryan Churchill MD [facility]    11/29/2023 10:00 AM [facility]    12/6/2023 10:00 AM [facility]-C [facility]   12/12/2023  3:30 PM [facility] Sloan APRN CNP [facility]         For any urgent concerns, please contact our 24 hour nurse triage line: [phone] ([phone])         Maria L Mckeon [facility]    Maria L Mckeon  [facility]  Clinic Care Coordinator  [facility]  [phone]  [email or web address]

## 2023-10-16 NOTE — LETTER
[facility] CARE COORDINATION  [address]  [facility] [address]    October 16, 2023    Moira Andre  [address]  [facility] [address]      Dear Moira,        I am a  clinic care coordinator who works with Maryan Churchill MD with the [facility]. I wanted to thank you for spending the time to talk with me.  Below is a description of clinic care coordination and how I can further assist you.       The clinic care coordination team is made up of a registered nurse, , financial resource worker and community health worker who understand the health care system. The goal of clinic care coordination is to help you manage your health and improve access to the health care system. Our team works alongside your provider to assist you in determining your health and social needs. We can help you obtain health care and community resources, providing you with necessary information and education. We can work with you through any barriers and develop a care plan that helps coordinate and strengthen the communication between you and your care team.  Our services are voluntary and are offered without charge to you personally.    Please feel free to contact me with any questions or concerns regarding care coordination and what we can offer.      We are focused on providing you with the highest-quality healthcare experience possible.    Sincerely,     Maria L Mckeon, [facility]  Clinic Care Coordinator  [facility]  [phone]

## 2023-10-17 ENCOUNTER — TELEPHONE (OUTPATIENT)
Dept: CARDIOLOGY | Facility: CLINIC | Age: 88
End: 2023-10-17
Payer: COMMERCIAL

## 2023-10-17 ENCOUNTER — TELEPHONE (OUTPATIENT)
Dept: FAMILY MEDICINE | Facility: CLINIC | Age: 88
End: 2023-10-17
Payer: COMMERCIAL

## 2023-10-17 NOTE — TELEPHONE ENCOUNTER
Home Care is calling regarding an established patient with [facility].       Requesting orders from: Maryan Churchill  Provider is following patient: Yes  Is this a 60-day recertification request?  No    Orders Requested    Physical Therapy  Request for initial certification (first set of orders)   Frequency:  1x/wk for 7 wks  To address gait, strength, balance.     Confirmed ok to leave a detailed message with call back.  Contact information confirmed and updated as needed.    Deann Owen RN

## 2023-10-17 NOTE — TELEPHONE ENCOUNTER
Patient was admitted to [facility] on 10/10/23 with congestive heart failure found to be in new onset A. Fib with RVR.    PMH: type 2 diabetes mellitus with diabetic nephropathy, chronic kidney disease stage III, spinal stenosis of the lumbosacral region, depression, ventral hernia, obstructive apnea on CPAP, asthma, and hypertension.     10/11/23: Echo showed EF of 55%, moderate mitral stenosis, moderate aortic stenosis with MG 20 mmHg.    IV Lasix diuresed and transitioned to po Lasix.    Pt was started on Eliquis, Lasix. PTA Metoprolol dosage was increased with good HR control. Plavix continued at time of discharge.    Called patient to discuss any post hospital d/c questions she  may have, review medication changes, and confirm f/u appts. Patient states [facility] RN has reviewed pt's medication changes.    Patient denied any SOB, chest pain, palpitations or light headedness.    RN confirmed with patient that she is scheduled for an OV on 12/12/23 at 1520 with ABEL Gricel Sloan at our [facility]. Dr. Sinha Team RN phone number provided.    Pt was discharged to home with [facility] services. [facility] RN and PT has already seen pt at home.    Patient advised to call clinic with any cardiac related questions or concerns prior to this abel't. Patient verbalized understanding and agreed with plan. LYNNE Carlson RN.

## 2023-10-17 NOTE — TELEPHONE ENCOUNTER
Writer called and spoke with Sy from [facility] and notified of PCP's confirmation of following for home care orders.    Sy appreciative of callback.    No further questions or concerns at this time.    Signing encounter.    Mora Yañez RN  [facility]

## 2023-10-18 ENCOUNTER — TELEPHONE (OUTPATIENT)
Dept: FAMILY MEDICINE | Facility: CLINIC | Age: 88
End: 2023-10-18
Payer: COMMERCIAL

## 2023-10-18 NOTE — TELEPHONE ENCOUNTER
Writer called and left detailed message for Pacheco PT with [facility] on confidential VM and notified of PCP's approval for requested home care orders.    Advised that Pacheco callback if any further questions or concerns.    Signing encounter.    Mora Yañez RN  [facility]

## 2023-10-18 NOTE — TELEPHONE ENCOUNTER
Home Care is calling regarding an established patient with [facility].       Requesting orders from: Maryan Churchill  Provider is following patient: Yes  Is this a 60-day recertification request?  No    Orders Requested    Skilled Nursing  Request for initial certification (first set of orders)   Frequency:  1x/wk for 6 wks       Rn also states, she was unable to get blood draw.    Information was gathered and will be sent to provider for review.  RN will contact Home Care with information after provider review.  Confirmed ok to leave a detailed message with call back.  Contact information confirmed and updated as needed.    Wilda Cohn RN

## 2023-10-19 ENCOUNTER — TELEPHONE (OUTPATIENT)
Dept: FAMILY MEDICINE | Facility: CLINIC | Age: 88
End: 2023-10-19
Payer: COMMERCIAL

## 2023-10-19 NOTE — TELEPHONE ENCOUNTER
Spoke to Nini in [facility] and verbal okay was given for skilled nursing.       Xiomy Tellez RN  [facility]

## 2023-10-19 NOTE — TELEPHONE ENCOUNTER
Left message for VIOLETA Zambrano on confidential VM and ok to leave detailed message, regarding PCP message below. Left clinic number to call back with questions/concerns.    Jossy Hathaway RN on 10/19/2023 at 10:49 AM

## 2023-10-19 NOTE — TELEPHONE ENCOUNTER
Home Care is calling regarding an established patient with [facility].        10/18/2023    11:57 AM   Home Care Information   Date of Home Care episode start 10/18/2023   Current following provider Dr. Churchill     Requesting orders from: Maryan Churchill  Provider is following patient: Yes  Is this a 60-day recertification request?  No    Orders Requested    Occupational Therapy  Request for initial certification (first set of orders)   Frequency:  1x/wk for 2 wks  then 1 every other week x/wk for 4 wks      Confirmed ok to leave a detailed message with call back.  Contact information confirmed and updated as needed.    Deann Owen RN

## 2023-10-26 ENCOUNTER — OFFICE VISIT (OUTPATIENT)
Dept: FAMILY MEDICINE | Facility: CLINIC | Age: 88
End: 2023-10-26
Payer: COMMERCIAL

## 2023-10-26 VITALS
BODY MASS INDEX: 41.91 KG/M2 | OXYGEN SATURATION: 97 % | TEMPERATURE: 98.1 F | RESPIRATION RATE: 18 BRPM | HEART RATE: 68 BPM | DIASTOLIC BLOOD PRESSURE: 74 MMHG | WEIGHT: 267.6 LBS | SYSTOLIC BLOOD PRESSURE: 108 MMHG

## 2023-10-26 DIAGNOSIS — E11.21 TYPE 2 DIABETES MELLITUS WITH DIABETIC NEPHROPATHY, WITHOUT LONG-TERM CURRENT USE OF INSULIN (H): ICD-10-CM

## 2023-10-26 DIAGNOSIS — Z79.899 MEDICATION MANAGEMENT: ICD-10-CM

## 2023-10-26 DIAGNOSIS — D64.9 ANEMIA, UNSPECIFIED TYPE: ICD-10-CM

## 2023-10-26 DIAGNOSIS — E66.01 MORBID OBESITY (H): ICD-10-CM

## 2023-10-26 DIAGNOSIS — I48.91 ATRIAL FIBRILLATION WITH RVR (H): ICD-10-CM

## 2023-10-26 DIAGNOSIS — G47.33 OSA ON CPAP: ICD-10-CM

## 2023-10-26 DIAGNOSIS — E66.2 OBESITY HYPOVENTILATION SYNDROME (H): ICD-10-CM

## 2023-10-26 DIAGNOSIS — G89.29 OTHER CHRONIC PAIN: ICD-10-CM

## 2023-10-26 DIAGNOSIS — I48.0 PAROXYSMAL ATRIAL FIBRILLATION (H): ICD-10-CM

## 2023-10-26 DIAGNOSIS — I50.30 HEART FAILURE WITH PRESERVED EJECTION FRACTION, NYHA CLASS I (H): Primary | ICD-10-CM

## 2023-10-26 PROBLEM — J81.0 ACUTE PULMONARY EDEMA (H): Status: RESOLVED | Noted: 2023-10-10 | Resolved: 2023-10-26

## 2023-10-26 LAB
HBA1C MFR BLD: 7.9 % (ref 0–5.6)
HGB BLD-MCNC: 13.2 G/DL (ref 11.7–15.7)

## 2023-10-26 PROCEDURE — 83036 HEMOGLOBIN GLYCOSYLATED A1C: CPT | Performed by: INTERNAL MEDICINE

## 2023-10-26 PROCEDURE — 80048 BASIC METABOLIC PNL TOTAL CA: CPT | Performed by: INTERNAL MEDICINE

## 2023-10-26 PROCEDURE — 36415 COLL VENOUS BLD VENIPUNCTURE: CPT | Performed by: INTERNAL MEDICINE

## 2023-10-26 PROCEDURE — 82728 ASSAY OF FERRITIN: CPT | Performed by: INTERNAL MEDICINE

## 2023-10-26 PROCEDURE — 85018 HEMOGLOBIN: CPT | Performed by: INTERNAL MEDICINE

## 2023-10-26 PROCEDURE — 99495 TRANSJ CARE MGMT MOD F2F 14D: CPT | Performed by: INTERNAL MEDICINE

## 2023-10-26 RX ORDER — FUROSEMIDE 20 MG
20 TABLET ORAL DAILY
Qty: 90 TABLET | Refills: 1 | Status: ON HOLD | OUTPATIENT
Start: 2023-10-26 | End: 2023-12-31

## 2023-10-26 RX ORDER — RESPIRATORY SYNCYTIAL VIRUS VACCINE 120MCG/0.5
0.5 KIT INTRAMUSCULAR ONCE
Qty: 1 EACH | Refills: 0 | Status: CANCELLED | OUTPATIENT
Start: 2023-10-26 | End: 2023-10-26

## 2023-10-26 RX ORDER — OXYCODONE HYDROCHLORIDE 5 MG/1
5 TABLET ORAL DAILY PRN
Qty: 15 TABLET | Refills: 0 | Status: ON HOLD | OUTPATIENT
Start: 2023-10-26 | End: 2023-11-29

## 2023-10-26 RX ORDER — METOPROLOL SUCCINATE 100 MG/1
100 TABLET, EXTENDED RELEASE ORAL 2 TIMES DAILY
Qty: 180 TABLET | Refills: 1 | Status: ON HOLD | OUTPATIENT
Start: 2023-10-26 | End: 2023-11-29

## 2023-10-26 ASSESSMENT — PATIENT HEALTH QUESTIONNAIRE - PHQ9
SUM OF ALL RESPONSES TO PHQ QUESTIONS 1-9: 5
10. IF YOU CHECKED OFF ANY PROBLEMS, HOW DIFFICULT HAVE THESE PROBLEMS MADE IT FOR YOU TO DO YOUR WORK, TAKE CARE OF THINGS AT HOME, OR GET ALONG WITH OTHER PEOPLE: NOT DIFFICULT AT ALL
SUM OF ALL RESPONSES TO PHQ QUESTIONS 1-9: 5

## 2023-10-26 ASSESSMENT — PAIN SCALES - GENERAL: PAINLEVEL: NO PAIN (0)

## 2023-10-26 ASSESSMENT — ASTHMA QUESTIONNAIRES: ACT_TOTALSCORE: 18

## 2023-10-26 NOTE — PROGRESS NOTES
Assessment & Plan     Kristyn was seen today for hospital f/u.    Diagnoses and all orders for this visit:    Heart failure with preserved ejection fraction, NYHA class I (H)  -     furosemide (LASIX) 20 MG tablet; Take 1 tablet (20 mg) by mouth daily  -     HEART FAILURE ACTION PLAN  Patient was in the hospital due to decompensated congestive heart failure  With IV Lasix she improved  Heart failure was triggered by her atrial fibrillation with rapid ventricular response  Currently with higher dose of metoprolol her heart rate is under control  She feels much better  She is on oral diuretics  Educated her about the management of heart failure  Discussed when to hold Lasix  Discussed when to contact us  All the good medication which we tried on her she is not able to afford  She could not afford Jardiance, Mounjaro     Paroxysmal atrial fibrillation (H)  Heart rate is now under control  She is on Eliquis    Obesity hypoventilation syndrome (H)  She is very obese that also affects her breathing    Atrial fibrillation with RVR (H)  -     apixaban ANTICOAGULANT (ELIQUIS) 2.5 MG tablet; Take 1 tablet (2.5 mg) by mouth 2 times daily  -     metoprolol succinate ER (TOPROL XL) 100 MG 24 hr tablet; Take 1 tablet (100 mg) by mouth 2 times daily  She was in the hospital and now her heart rate is under control  Patient is on Eliquis as well on Plavix due to history of coronary artery disease  I discussed with her about discussing with cardiology to make sure she needs to continue both  Per patient she discussed with them in the hospital and they told her to continue both    Morbid obesity (H)  Discussed the importance of healthy diet and exercise  Could not afford Mounjaro or any GLP-1 or SGLT2    ELIGIO on CPAP  Comments:  not using Cpap as waiting to get new robert  She is very noncompliant  I discussed the importance of that as she has atrial fibrillation    Type 2 diabetes mellitus with diabetic nephropathy, without long-term  "current use of insulin (H)  -     Hemoglobin A1c; Future  -     Hemoglobin A1c  Lab Results   Component Value Date    A1C 7.9 10/26/2023    A1C 6.5 06/26/2023    A1C 6.9 02/24/2023    A1C 6.8 10/24/2022    A1C 6.7 06/27/2022    A1C 6.2 08/24/2020    A1C 6.5 01/20/2020    A1C 6.1 09/20/2019    A1C 6.1 06/19/2019    A1C 6.4 03/04/2019     Patient is on glimepiride as this is the one she can afford    Anemia, unspecified type  -     Hemoglobin; Future  -     Ferritin; Future  -     Hemoglobin  -     Ferritin    Medication management  -     Basic metabolic panel  (Ca, Cl, CO2, Creat, Gluc, K, Na, BUN); Future  -     Basic metabolic panel  (Ca, Cl, CO2, Creat, Gluc, K, Na, BUN)    Other chronic pain  -     oxyCODONE (ROXICODONE) 5 MG tablet; Take 1 tablet (5 mg) by mouth daily as needed for pain    Patient requested some oxycodone as she sometimes have very bad flareup of the chronic pain  She uses only if absolutely needed  Mostly she takes Tylenol and use topical medication  But she wants to keep it for emergency to avoid ER visit  [id number]}   MED REC REQUIRED  Post Medication Reconciliation Status: discharge medications reconciled and changed, per note/orders  BMI:   Estimated body mass index is 41.91 kg/m  as calculated from the following:    Height as of 10/10/23: 1.702 m (5' 7\").    Weight as of this encounter: 121.4 kg (267 lb 9.6 oz).   Weight management plan: Discussed healthy diet and exercise guidelines    See Patient Instructions  Patient Instructions   Flu shot today  Respiratory syncytial virus (RSV) is an important cause of lower respiratory tract disease in older adults.   In 2023, the US Food and Drug Administration approved two recombinant RSV vaccines for the prevention of lower respiratory tract disease in individuals 60 years of age and older [1,2].     this vaccine prevented RSV-related respiratory infection and lower respiratory tract disease in adults age 60 years and older who received one dose of an " AS01E-adjuvanted RSV Prefusion F Protein Vaccine [51].     This infection may cause serious infection like pneumonia in older patients     So it is good idea to get this vaccine     Keep your follow up appointment as scheduled     Follow up in 4 weeks  Seek sooner medical attention if there is any worsening of symptoms or problems.        Maryan Churchill MD  [facility] FARIDA Simons is a 89 year old, presenting for the following health issues:  Hospital F/U      History of Present Illness   She consumes 3 sweetened beverage(s) daily.            10/16/2023     1:35 PM   Post Discharge Outreach   Admission Date 10/10/2023   Reason for Admission SOB   Discharge Date 10/13/2023   Discharge Diagnosis Acute hypoxic respiratory failure  Atrial fibrillation with RVR, new diagnosis  Pulmonary edema  Acute on chronic congestive heart failure with preserved ejection fraction  Coronary artery disease  Elevated troponin  Moderate mitral stenosis  Moderate aortic stenosis  Hypertension  Hyperlipidemia  Asthma  Chronic kidney disease, stage IIIb  Diabetes mellitus, type 2  Mild hyperkalemia   How are you doing now that you are home? Much better   How are your symptoms? (Red Flag symptoms escalate to triage hotline per guidelines) Improved   Do you feel your condition is stable enough to be safe at home until your provider visit? Yes   Does the patient have their discharge instructions?  Yes   Does the patient have questions regarding their discharge instructions?  No   Do you have questions regarding any of your medications?  No   Do you have all of your needed medical supplies or equipment (DME)?  (i.e. oxygen tank, CPAP, cane, etc.) Yes   Discharge follow-up appointment scheduled within 14 calendar days?  Yes   Discharge Follow Up Appointment Scheduled with? Primary Care Provider     Hospital Follow-up Visit:    Hospital/Nursing Home/ Rehab Facility: [facility]  Date  of Admission: 10/10/23  Date of Discharge: 10/13/23  Reason(s) for Admission: Acute hypoxic respiratory failure  Atrial fibrillation with RVR, new diagnosis  Pulmonary edema  Acute on chronic congestive heart failure with preserved ejection fraction  Coronary artery disease  Elevated troponin  Moderate mitral stenosis  Moderate aortic stenosis  Hypertension  Hyperlipidemia  Asthma  Chronic kidney disease, stage IIIb  Diabetes mellitus, type 2  Mild hyperkalemia  ELIGIO  Depression    Was your hospitalization related to COVID-19? No   Problems taking medications regularly:  None  Medication changes since discharge: None  Problems adhering to non-medication therapy:  None    Summary of hospitalization:  [facility] discharge summary reviewed  Diagnostic Tests/Treatments reviewed.  Follow up needed: cardiology  Other Healthcare Providers Involved in Patient s Care:         Homecare and Care Coordination  Update since discharge: improved.         Plan of care communicated with patient                 Review of Systems   Constitutional, HEENT, cardiovascular, pulmonary, GI, , musculoskeletal, neuro, skin, endocrine and psych systems are negative, except as otherwise noted.      Objective    /74 (BP Location: Right arm, Patient Position: Sitting, Cuff Size: Adult Large)   Pulse 68   Temp 98.1  F (36.7  C) (Oral)   Resp 18   Wt 121.4 kg (267 lb 9.6 oz)   SpO2 97%   BMI 41.91 kg/m    Body mass index is 41.91 kg/m .  Physical Exam   She is very nice and pleasant.  She is comfortable and not in any kind of distress.  She is fully alert awake oriented.  She walks with the help of walker  She lives by herself  She is a still able to drive  Disclaimer: This note consists of symbols derived from keyboarding, dictation and/or voice recognition software. As a result, there may be errors in the script that have gone undetected. Please consider this when interpreting information found in this chart.

## 2023-10-26 NOTE — LETTER
My Heart Failure Action Plan  Name: Moira Andre   YOB: 1933  Date: 10/26/2023   My doctor:   Maryan Churchill [place] [address] [facility], [address]  [facility] [address] [phone] My Diagnosis: HF-pEF (EF > 40%)  My Ejection Fraction:   Lab Results   Component Value Date    LVEF 55% 10/12/2023     Over 50%  My Exercise Goal: Start exercise slowly - to begin, do a few minutes of exercise, several times a day. Increase your time and speed [id number] to build tolerance, with a goal of 30 minutes of exercise daily. Steady, slow, and consistent exercise is both safe and healthy. Stop and rest when you feel tired or become short of breath. Do not push yourself on days when you don t feel well.       My Weight Plan:   Wt Readings from Last 2 Encounters:   10/26/23 121.4 kg (267 lb 9.6 oz)   10/13/23 121.3 kg (267 lb 8 oz)     Weigh yourself daily using the same scale. If you gain more than 2 pounds in 24 hours or 5 pounds in 7 days. call the clinic    My Diet Goal: No added salt    Emergency Room Visits:    Our goal is to improve your quality of life and help you avoid a visit to the emergency room or hospital.  If we work together, we can achieve this goal. But, if you feel you need to call 911 or go to the emergency room, please do so.  If you go to the emergency room, please bring your list of medicines and your daily weight chart with you.    Each day ask yourself:  Is my weight up?  Do I have swelling?  Do I have trouble breathing?  How did I sleep?  Other problems?       GREEN ZONE     Weight gained is no more than 2 pounds a day or 5 pounds a in 7 days.  No swelling in feet, ankles, legs or stomach.  No more swelling than usual.  No more trouble breathing than usual.  No change in my sleep.  No other problems. What should I do?  I am doing fine. I will take my medicine, follow my diet, see my doctor, exercise, and watch for symptoms.           YELLOW  ZONE         Weight gain of more than 2 pounds in one day or 5 pounds in 7 days.  New swelling in ankle, leg, knee or thigh.  Bloating in belly, pants feel tighter.  Swelling in hands or face.  Coughing or trouble breathing while walking or talking.  Harder to breathe last night.  Have trouble sleeping, wake up short of breath.  Unusually tired.  Not eating.  Nausea, vomiting, or diarrhea  Pain in my chest or bad  leg cramps.  Feel weak or dizzy. What should I do?  I need to take action and call my doctor or nurse today.                 RED ZONE         Weight gain of 5 pounds overnight.  Chest pain or pressure that does not go away.  Feel less alert.  Wheezing or have trouble breathing when at rest.  Cannot sleep lying down.  Cannot take my medicines.  Pass out or faint. What should I do?  I need to call my doctor or nurse now!  Call 911 if I have chest pain or cannot breathe.

## 2023-10-26 NOTE — LETTER
[place] [address] [address] [facility] [facility]  [address]  [place]  [address]  Tel: [phone]    October 30, 2023    Moira Andre  [address] [place] [address]  [facility] [address]        Dear Ms. Andre,    This is to inform you regarding your test result.     Chronic kidney disease is slightly worse   HbA1c which is average glucose during last 3 months is 7.9%   Your numbers have gone up   Hemoglobin is normal.       Sincerely,       Dr.Nasima Kerline MD,FACP/SML        Enclosure: Lab Results  Results for orders placed or performed in visit on 10/26/23   Hemoglobin A1c     Status: Abnormal   Result Value Ref Range    Hemoglobin A1C 7.9 (H) 0.0 - 5.6 %   Hemoglobin     Status: Normal   Result Value Ref Range    Hemoglobin 13.2 11.7 - 15.7 g/dL   Ferritin     Status: Normal   Result Value Ref Range    Ferritin 215 11 - 328 ng/mL   Basic metabolic panel  (Ca, Cl, CO2, Creat, Gluc, K, Na, BUN)     Status: Abnormal   Result Value Ref Range    Sodium 136 135 - 145 mmol/L    Potassium 4.8 3.4 - 5.3 mmol/L    Chloride 100 98 - 107 mmol/L    Carbon Dioxide (CO2) 20 (L) 22 - 29 mmol/L    Anion Gap 16 (H) 7 - 15 mmol/L    Urea Nitrogen 44.3 (H) 8.0 - 23.0 mg/dL    Creatinine 1.93 (H) 0.51 - 0.95 mg/dL    GFR Estimate 24 (L) >60 mL/min/1.73m2    Calcium 9.5 8.2 - 9.6 mg/dL    Glucose 126 (H) 70 - 99 mg/dL

## 2023-10-26 NOTE — PATIENT INSTRUCTIONS
Flu shot today  Respiratory syncytial virus (RSV) is an important cause of lower respiratory tract disease in older adults.   In 2023, the US Food and Drug Administration approved two recombinant RSV vaccines for the prevention of lower respiratory tract disease in individuals 60 years of age and older [1,2].     this vaccine prevented RSV-related respiratory infection and lower respiratory tract disease in adults age 60 years and older who received one dose of an AS01E-adjuvanted RSV Prefusion F Protein Vaccine [51].     This infection may cause serious infection like pneumonia in older patients     So it is good idea to get this vaccine     Keep your follow up appointment as scheduled     Follow up in 4 weeks  Seek sooner medical attention if there is any worsening of symptoms or problems.

## 2023-10-27 ENCOUNTER — TELEPHONE (OUTPATIENT)
Dept: FAMILY MEDICINE | Facility: CLINIC | Age: 88
End: 2023-10-27
Payer: COMMERCIAL

## 2023-10-27 LAB
ANION GAP SERPL CALCULATED.3IONS-SCNC: 16 MMOL/L (ref 7–15)
BUN SERPL-MCNC: 44.3 MG/DL (ref 8–23)
CALCIUM SERPL-MCNC: 9.5 MG/DL (ref 8.2–9.6)
CHLORIDE SERPL-SCNC: 100 MMOL/L (ref 98–107)
CREAT SERPL-MCNC: 1.93 MG/DL (ref 0.51–0.95)
DEPRECATED HCO3 PLAS-SCNC: 20 MMOL/L (ref 22–29)
EGFRCR SERPLBLD CKD-EPI 2021: 24 ML/MIN/1.73M2
FERRITIN SERPL-MCNC: 215 NG/ML (ref 11–328)
GLUCOSE SERPL-MCNC: 126 MG/DL (ref 70–99)
POTASSIUM SERPL-SCNC: 4.8 MMOL/L (ref 3.4–5.3)
SODIUM SERPL-SCNC: 136 MMOL/L (ref 135–145)

## 2023-10-27 NOTE — TELEPHONE ENCOUNTER
Medical Clarification Request form from [employer], form placed on PCP's desk. [employer] requesting completed form faxed back.    Melodie Brizuela RN  [facility]   Severe preeclampsia

## 2023-10-28 NOTE — RESULT ENCOUNTER NOTE
Please notify patient by sending following letter with copy of test results      Mert Pizarro,    This is to inform you regarding your test result.    Chronic kidney disease is slightly worse   HbA1c which is average glucose during last 3 months is 7.9%  Your numbers have gone up   Hemoglobin is normal.      Sincerely,      Dr.Nasima Kerline MD,FACP

## 2023-10-30 DIAGNOSIS — Z53.9 DIAGNOSIS NOT YET DEFINED: Primary | ICD-10-CM

## 2023-10-30 PROCEDURE — G0180 MD CERTIFICATION HHA PATIENT: HCPCS | Performed by: INTERNAL MEDICINE

## 2023-11-16 ENCOUNTER — OFFICE VISIT (OUTPATIENT)
Dept: FAMILY MEDICINE | Facility: CLINIC | Age: 88
End: 2023-11-16
Payer: COMMERCIAL

## 2023-11-16 VITALS
HEART RATE: 117 BPM | SYSTOLIC BLOOD PRESSURE: 125 MMHG | HEIGHT: 67 IN | TEMPERATURE: 97.9 F | BODY MASS INDEX: 41.94 KG/M2 | WEIGHT: 267.2 LBS | RESPIRATION RATE: 10 BRPM | DIASTOLIC BLOOD PRESSURE: 79 MMHG | OXYGEN SATURATION: 97 %

## 2023-11-16 DIAGNOSIS — I35.0 NONRHEUMATIC AORTIC VALVE STENOSIS: ICD-10-CM

## 2023-11-16 DIAGNOSIS — G89.29 OTHER CHRONIC PAIN: ICD-10-CM

## 2023-11-16 DIAGNOSIS — Z79.899 MEDICATION MANAGEMENT: ICD-10-CM

## 2023-11-16 DIAGNOSIS — I34.2 NONRHEUMATIC MITRAL VALVE STENOSIS: ICD-10-CM

## 2023-11-16 DIAGNOSIS — E66.01 MORBID OBESITY (H): ICD-10-CM

## 2023-11-16 DIAGNOSIS — E11.21 TYPE 2 DIABETES MELLITUS WITH DIABETIC NEPHROPATHY, WITHOUT LONG-TERM CURRENT USE OF INSULIN (H): ICD-10-CM

## 2023-11-16 DIAGNOSIS — M85.80 OSTEOPENIA WITH HIGH RISK OF FRACTURE: ICD-10-CM

## 2023-11-16 DIAGNOSIS — I25.10 CORONARY ARTERY DISEASE INVOLVING NATIVE CORONARY ARTERY OF NATIVE HEART WITHOUT ANGINA PECTORIS: ICD-10-CM

## 2023-11-16 DIAGNOSIS — Z00.00 ENCOUNTER FOR MEDICARE ANNUAL WELLNESS EXAM: Primary | ICD-10-CM

## 2023-11-16 DIAGNOSIS — F32.1 MODERATE MAJOR DEPRESSION (H): ICD-10-CM

## 2023-11-16 DIAGNOSIS — E66.2 OBESITY HYPOVENTILATION SYNDROME (H): ICD-10-CM

## 2023-11-16 DIAGNOSIS — M54.16 LUMBAR RADICULOPATHY: ICD-10-CM

## 2023-11-16 DIAGNOSIS — I48.91 ATRIAL FIBRILLATION WITH RVR (H): ICD-10-CM

## 2023-11-16 DIAGNOSIS — J45.20 MILD INTERMITTENT ASTHMA WITHOUT COMPLICATION: ICD-10-CM

## 2023-11-16 PROCEDURE — G0439 PPPS, SUBSEQ VISIT: HCPCS | Performed by: INTERNAL MEDICINE

## 2023-11-16 PROCEDURE — 99214 OFFICE O/P EST MOD 30 MIN: CPT | Mod: 25 | Performed by: INTERNAL MEDICINE

## 2023-11-16 RX ORDER — RESPIRATORY SYNCYTIAL VIRUS VACCINE 120MCG/0.5
0.5 KIT INTRAMUSCULAR ONCE
Qty: 1 EACH | Refills: 0 | Status: CANCELLED | OUTPATIENT
Start: 2023-11-16 | End: 2023-11-16

## 2023-11-16 RX ORDER — GLIMEPIRIDE 2 MG/1
2 TABLET ORAL
Qty: 200 TABLET | Refills: 2 | Status: ON HOLD | OUTPATIENT
Start: 2023-11-16 | End: 2024-08-07

## 2023-11-16 ASSESSMENT — PATIENT HEALTH QUESTIONNAIRE - PHQ9: SUM OF ALL RESPONSES TO PHQ QUESTIONS 1-9: 4

## 2023-11-16 ASSESSMENT — ACTIVITIES OF DAILY LIVING (ADL): CURRENT_FUNCTION: NO ASSISTANCE NEEDED

## 2023-11-16 ASSESSMENT — PAIN SCALES - GENERAL: PAINLEVEL: NO PAIN (0)

## 2023-11-16 NOTE — PROGRESS NOTES
"SUBJECTIVE:   Kristyn is a 89 year old, presenting for the following:  Physical    Are you in the first 12 months of your Medicare coverage?  No    Healthy Habits:     In general, how would you rate your overall health?  Good    Frequency of exercise:  4-5 days/week    Duration of exercise:  15-30 minutes    Do you usually eat at least 4 servings of fruit and vegetables a day, include whole grains    & fiber and avoid regularly eating high fat or \"junk\" foods?  No    Taking medications regularly:  Yes    Barriers to taking medications:  None    Medication side effects:  None    Ability to successfully perform activities of daily living:  No assistance needed    Home Safety:  No safety concerns identified    Hearing Impairment:  No hearing concerns    In the past 6 months, have you been bothered by leaking of urine? Yes    In general, how would you rate your overall mental or emotional health?  Good    Additional concerns today:  Yes    Have you ever done Advance Care Planning? (For example, a Health Directive, POLST, or a discussion with a medical provider or your loved ones about your wishes): No, advance care planning information given to patient to review.  Patient plans to discuss their wishes with loved ones or provider.         Fall risk  Fallen 2 or more times in the past year?: No  Any fall with injury in the past year?: No    Cognitive Screening   1) Repeat 3 items (Leader, Season, Table)    2) Clock draw: NORMAL  3) 3 item recall: Recalls 3 objects  Results: NORMAL clock, 1-2 items recalled: COGNITIVE IMPAIRMENT LESS LIKELY    Mini-CogTM Copyright SATISH Cortez. Licensed by the author for use in [facility]; reprinted with permission ([email or web address]). All rights reserved.      Do you have sleep apnea, excessive snoring or daytime drowsiness? : yes    Reviewed and updated as needed this visit by clinical staff                  Reviewed and updated as needed this visit by Provider                 Social " History     Tobacco Use    Smoking status: Former     Packs/day: 0.25     Years: 1.00     Additional pack years: 0.00     Total pack years: 0.25     Types: Cigarettes     Start date:      Quit date: 1973     Years since quittin.5    Smokeless tobacco: Never   Substance Use Topics    Alcohol use: Yes     Alcohol/week: 0.0 - 1.0 standard drinks of alcohol     Comment: rarely             10/24/2022    11:40 AM   Alcohol Use   Prescreen: >3 drinks/day or >7 drinks/week? Not Applicable          No data to display              Do you have a current opioid prescription? No  Do you use any other controlled substances or medications that are not prescribed by a provider? None    Current providers sharing in care for this patient include:   Patient Care Team:  Maryan Churchill MD as PCP - General (Internal Medicine)  Maryan Churchill MD as Assigned PCP  Leighton Patel MD as Assigned Rheumatology Provider  Jessica Medrano MD as MD (Nephrology)  Lynnette Parks MD as Assigned Nephrology Provider  Liberty Morin, PharmD as Pharmacist (Pharmacist)  Liberty Morin, PharmD as Assigned [facility] Pharmacist  Gricel Sloan APRN CNP as Nurse Practitioner (Cardiovascular Disease)    The following health maintenance items are reviewed in Epic and correct as of today:  Health Maintenance   Topic Date Due    ASTHMA ACTION PLAN  2023    URINE DRUG SCREEN  2023    DIABETIC FOOT EXAM  10/24/2023    ZOSTER IMMUNIZATION (2 of 2) 2023    A1C  2024    BMP  2024    ASTHMA CONTROL TEST  2024    PHQ-9  2024    LIPID  2024    EYE EXAM  2024    MICROALBUMIN  2024    ALT  10/11/2024    CBC  10/11/2024    HEMOGLOBIN  10/26/2024    MEDICARE ANNUAL WELLNESS VISIT  2024    ANNUAL REVIEW OF HM ORDERS  2024    FALL RISK ASSESSMENT  2024    HF ACTION PLAN  10/26/2026    ADVANCE CARE PLANNING  2028    DTAP/TDAP/TD IMMUNIZATION (3 - Td or Tdap)  "06/26/2033    TSH W/FREE T4 REFLEX  Completed    DEPRESSION ACTION PLAN  Completed    INFLUENZA VACCINE  Completed    Pneumococcal Vaccine: 65+ Years  Completed    URINALYSIS  Completed    RSV VACCINE (Pregnancy & 60+)  Completed    COVID-19 Vaccine  Completed    IPV IMMUNIZATION  Aged Out    HPV IMMUNIZATION  Aged Out    MENINGITIS IMMUNIZATION  Aged Out    RSV MONOCLONAL ANTIBODY  Aged Out     Labs reviewed in EPIC  Pertinent mammograms are reviewed under the imaging tab.    Review of Systems  Constitutional, HEENT, cardiovascular, pulmonary, GI, , musculoskeletal, neuro, skin, endocrine and psych systems are negative, except as otherwise noted.    OBJECTIVE:   /79   Pulse 117   Temp 97.9  F (36.6  C) (Tympanic)   Resp 10   Ht 1.702 m (5' 7\")   Wt 121.2 kg (267 lb 3.2 oz)   SpO2 97%   BMI 41.85 kg/m   Estimated body mass index is 41.85 kg/m  as calculated from the following:    Height as of this encounter: 1.702 m (5' 7\").    Weight as of this encounter: 121.2 kg (267 lb 3.2 oz).  Physical Exam  GENERAL APPEARANCE: healthy, alert and no distress  EYES: Eyes grossly normal to inspection, PERRL and conjunctivae and sclerae normal  HENT: Bilateral hearing aids utilized, ear canals and TM's normal and nose and mouth without ulcers or lesions  NECK: no adenopathy  RESP: lungs clear to auscultation - no rales, rhonchi or wheezes  CV: Irregular rhythm due to atrial fibrillation, normal S1 S2, no S3  Ventral hernia   Reducible it is very large    Diagnostic Test Results:  Labs reviewed in Epic    ASSESSMENT / PLAN:   Kristyn was seen today for physical.    Diagnoses and all orders for this visit:    Encounter for Medicare annual wellness exam  Preventive health counseling was also done.   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, without long-term current use of insulin (H)  Diabetes are not under good control   Blood sugar monitored at home   Last check was at 200   Pt is on glimepiride 2 mg   I will increase dose to 2 mg BID  due to elevated blood sugar  Continue to monitor blood sugar   Hold if NPO   Weight has decreased, positively   -     glimepiride (AMARYL) 2 MG tablet; Take 1 tablet (2 mg) by mouth 2 times daily (before meals)  Lab Results   Component Value Date    A1C 7.9 10/26/2023    A1C 6.5 06/26/2023    A1C 6.9 02/24/2023    A1C 6.8 10/24/2022    A1C 6.7 06/27/2022    A1C 6.2 08/24/2020    A1C 6.5 01/20/2020    A1C 6.1 09/20/2019    A1C 6.1 06/19/2019    A1C 6.4 03/04/2019     She did not tolerate GLP1  Other medications were not affordable for her     Atrial fibrillation with RVR (H)  History of atrial fibrillation with RVR   Patient is on Eliquis as well on Plavix due to history of coronary artery disease   I reminded pt that NSAIDs are not allowed when taking eliguis  I educated pt that any injection or surgery that are to be scheduled, pt should notify the doctors that she is on eliquis and plavix 75 mg   Pt shares that she feels a small flutter at certain points, no pain is present.   EKG completed 10/10/23     Obesity hypoventilation syndrome (H)  She is very obese that also affects her breathing     Coronary artery disease involving native coronary artery of native heart without angina pectoris  Past history     Moderate major depression (H)  Taking wellbutrin and duloxetin  Doing well    Morbid obesity (H)  Could not afford Mounjaro or any GLP-1 or SGLT2 ( it was not affordable for her )    Other chronic pain  Pt shares that her back pain is still present   Doing conservative treatment .    Medication management  Urine test ordered for future   -     [id number] - Urine Drug Confirmation Panel (Comprehensive); Future    Lumbar radiculopathy  Chronic she has seen specialists     Nonrheumatic aortic valve  stenosis  EKG completed 10/10/23   Moderate aortic stenosis and moderate mitral stenosis.  Stent placement in the past   SOB is present at certain point   Comments:  moderate  seen on echo    Nonrheumatic mitral valve stenosis  Comments:  moderate   seen on echo    Osteopenia with high risk of fracture  Discontinue boniva due to low GFR  Does not prolia due to cost     Mild intermittent asthma without complication  Pt is on albuterol 2 puffs every 6 hours PRN    Pt is on fluticasone-salmeterol 1 puff every 12 hours   Pt does not utilized CPAP machine     Other orders  -     REVIEW OF HEALTH MAINTENANCE PROTOCOL ORDERS  -     PRIMARY CARE FOLLOW-UP SCHEDULING; Future    Patient is not going to have surgery for ventral hernia due to multiple comorbidities     Other  Pt is on furosemide 20 mg   Triamcinolone cream 0.1% provides positive results     She wears Bilateral hearing aids    Pt is followed by nephrologist   Scheduled appointment 12/6/23   Boniva contraindicates with low GFR, discontinuing Boniva   Pt shares that she using the restroom frequently   I recommend prolia injections, pt shares that the price of the medication is too high   I recommend continue calcium and vitamin D daily     Patient has been advised of split billing requirements and indicates understanding: Yes    COUNSELING:  Reviewed preventive health counseling, as reflected in patient instructions       Regular exercise       Healthy diet/nutrition    She reports that she quit smoking about 50 years ago. Her smoking use included cigarettes. She started smoking about 51 years ago. She has a 0.25 pack-year smoking history. She has never used smokeless tobacco.      Appropriate preventive services were discussed with this patient, including applicable screening as appropriate for fall prevention, nutrition, physical activity, Tobacco-use cessation, weight loss and cognition.  Checklist reviewing preventive services available has been given to the  patient.    Reviewed patients plan of care and provided an AVS. The Basic Care Plan (routine screening as documented in Health Maintenance) for Moira meets the Care Plan requirement. This Care Plan has been established and reviewed with the Patient.          Maryan Churchill MD  [facility]    Identified Health Risks:  This document serves as a record of sevices personally performed by Dr. Churchill. It was created on her behalf by Kaye Coleman, a trained medical scribe. The creation of this record is based on the scribe's personal observations and the provider's statements to them. This document has been checked and approved by the attending provider.     11/16/2023, 10:35 AM

## 2023-11-16 NOTE — PATIENT INSTRUCTIONS
You are due for second shingle shot     Avoid OTC NSAIDS like ibuprofen,motrin or aleve as you are on blood thinner.  Tylenol which is acetaminophen is ok to use .    Make sure you let the doctor know about your blood thinners if you are going to have any procedure   As will have to hold those before any procedure     The dose of glimepiride is increased to 2 mg twice daily  Watch for low blood sugar  Hold if you are going to be NPO    Discontinue boniva     Follow up in 3 months.  Seek sooner medical attention if there is any worsening of symptoms or problems.           Patient Education   Personalized Prevention Plan  You are due for the preventive services outlined below.  Your care team is available to assist you in scheduling these services.  If you have already completed any of these items, please share that information with your care team to update in your medical record.  Health Maintenance Due   Topic Date Due    Asthma Action Plan - yearly  03/03/2023    URINE DRUG SCREEN  06/27/2023    Diabetic Foot Exam  10/24/2023    ANNUAL REVIEW OF HM ORDERS  10/24/2023    Zoster (Shingles) Vaccine (2 of 2) 08/11/2023     Learning About Dietary Guidelines  What are the Dietary Guidelines for Americans?     Dietary Guidelines for Americans provide tips for eating well and staying healthy. This helps reduce the risk for long-term (chronic) diseases.  These guidelines recommend that you:  Eat and drink the right amount for you. The [place] government's food guide is called MyPlate. It can help you make your own well-balanced eating plan.  Try to balance your eating with your activity. This helps you stay at a healthy weight.  Drink alcohol in moderation, if at all.  Limit foods high in salt, saturated fat, trans fat, and added sugar.  These guidelines are from the U.S. Department of Agriculture and the U.S. Department of Health and Human Services. They are updated every 5 years.  What is MyPlate?  MyPlate is the [place]  "government's food guide. It can help you make your own well-balanced eating plan. A balanced eating plan means that you eat enough, but not too much, and that your food gives you the nutrients you need to stay healthy.  MyPlate focuses on eating plenty of whole grains, fruits, and vegetables, and on limiting fat and sugar. It is available online at www.ChooseMyPlate.gov.  How can you get started?  If you're trying to eat healthier, you can slowly change your eating habits over time. You don't have to make big changes all at once. Start by adding one or two healthy foods to your meals each day.  Grains  Choose whole-grain breads and cereals and whole-wheat pasta and whole-grain crackers.  Vegetables  Eat a variety of vegetables every day. They have lots of nutrients and are part of a heart-healthy diet.  Fruits  Eat a variety of fruits every day. Fruits contain lots of nutrients. Choose fresh fruit instead of fruit juice.  Protein foods  Choose fish and lean poultry more often. Eat red meat and fried meats less often. Dried beans, tofu, and nuts are also good sources of protein.  Dairy  Choose low-fat or fat-free products from this food group. If you have problems digesting milk, try eating cheese or yogurt instead.  Fats and oils  Limit fats and oils if you're trying to cut calories. Choose healthy fats when you cook. These include canola oil and olive oil.  Where can you learn more?  Go to https://www.[employer].net/patiented  Enter D676 in the search box to learn more about \"Learning About Dietary Guidelines.\"  Current as of: February 28, 2023               Content Version: 13.8    5634-8216 [employer].   Care instructions adapted under license by your healthcare professional. If you have questions about a medical condition or this instruction, always ask your healthcare professional. [employer] disclaims any warranty or liability for your use of this information.      Bladder Training: " Care Instructions  Your Care Instructions     Bladder training is used to treat urge incontinence and stress incontinence. Urge incontinence means that the need to urinate comes on so fast that you can't get to a toilet in time. Stress incontinence means that you leak urine because of pressure on your bladder. For example, it may happen when you laugh, cough, or lift something heavy.  Bladder training can increase how long you can wait before you have to urinate. It can also help your bladder hold more urine. And it can give you better control over the urge to urinate.  It is important to remember that bladder training takes a few weeks to a few months to make a difference. You may not see results right away, but don't give up.  Follow-up care is a key part of your treatment and safety. Be sure to make and go to all appointments, and call your doctor if you are having problems. It's also a good idea to know your test results and keep a list of the medicines you take.  How can you care for yourself at home?  Work with your doctor to come up with a bladder training program that is right for you. You may use one or more of the following methods.  Delayed urination  In the beginning, try to keep from urinating for 5 minutes after you first feel the need to go.  While you wait, take deep, slow breaths to relax. Kegel exercises can also help you delay the need to go to the bathroom.  After some practice, when you can easily wait 5 minutes to urinate, try to wait 10 minutes before you urinate.  Slowly increase the waiting period until you are able to control when you have to urinate.  Scheduled urination  Empty your bladder when you first wake up in the morning.  Schedule times throughout the day when you will urinate.  Start by going to the bathroom every hour, even if you don't need to go.  Slowly increase the time between trips to the bathroom.  When you have found a schedule that works well for you, keep doing it.  If  "you wake up during the night and have to urinate, do it. Apply your schedule to waking hours only.  Kegel exercises  These tighten and strengthen pelvic muscles, which can help you control the flow of urine. (If doing these exercises causes pain, stop doing them and talk with your doctor.) To do Kegel exercises:  Squeeze your muscles as if you were trying not to pass gas. Or squeeze your muscles as if you were stopping the flow of urine. Your belly, legs, and buttocks shouldn't move.  Hold the squeeze for 3 seconds, then relax for 5 to 10 seconds.  Start with 3 seconds, then add 1 second each week until you are able to squeeze for 10 seconds.  Repeat the exercise 10 times a session. Do 3 to 8 sessions a day.  When should you call for help?  Watch closely for changes in your health, and be sure to contact your doctor if:    Your incontinence is getting worse.     You do not get better as expected.   Where can you learn more?  Go to https://www.[employer].net/patiented  Enter V684 in the search box to learn more about \"Bladder Training: Care Instructions.\"  Current as of: February 28, 2023               Content Version: 13.8    8443-3386 [employer].   Care instructions adapted under license by your healthcare professional. If you have questions about a medical condition or this instruction, always ask your healthcare professional. [employer] disclaims any warranty or liability for your use of this information.         "

## 2023-11-25 ENCOUNTER — HOSPITAL ENCOUNTER (INPATIENT)
Facility: CLINIC | Age: 88
LOS: 4 days | Discharge: HOME | DRG: 872 | End: 2023-11-29
Attending: EMERGENCY MEDICINE | Admitting: INTERNAL MEDICINE
Payer: COMMERCIAL

## 2023-11-25 ENCOUNTER — APPOINTMENT (OUTPATIENT)
Dept: CT IMAGING | Facility: CLINIC | Age: 88
DRG: 872 | End: 2023-11-25
Attending: EMERGENCY MEDICINE
Payer: COMMERCIAL

## 2023-11-25 DIAGNOSIS — M54.9 UPPER BACK PAIN: ICD-10-CM

## 2023-11-25 DIAGNOSIS — N20.0 KIDNEY STONE: ICD-10-CM

## 2023-11-25 DIAGNOSIS — N12 PYELONEPHRITIS: ICD-10-CM

## 2023-11-25 DIAGNOSIS — G89.29 OTHER CHRONIC PAIN: ICD-10-CM

## 2023-11-25 DIAGNOSIS — I48.91 ATRIAL FIBRILLATION WITH RAPID VENTRICULAR RESPONSE (H): ICD-10-CM

## 2023-11-25 DIAGNOSIS — M62.830 BACK MUSCLE SPASM: Primary | ICD-10-CM

## 2023-11-25 DIAGNOSIS — R21 RASH: ICD-10-CM

## 2023-11-25 DIAGNOSIS — I48.91 ATRIAL FIBRILLATION WITH RVR (H): ICD-10-CM

## 2023-11-25 DIAGNOSIS — E87.1 HYPONATREMIA: ICD-10-CM

## 2023-11-25 LAB
ALBUMIN SERPL BCG-MCNC: 4.2 G/DL (ref 3.5–5.2)
ALBUMIN UR-MCNC: 100 MG/DL
ALP SERPL-CCNC: 83 U/L (ref 40–150)
ALT SERPL W P-5'-P-CCNC: 9 U/L (ref 0–50)
ANION GAP SERPL CALCULATED.3IONS-SCNC: 14 MMOL/L (ref 7–15)
ANION GAP SERPL CALCULATED.3IONS-SCNC: <1 MMOL/L (ref 7–15)
APPEARANCE UR: ABNORMAL
AST SERPL W P-5'-P-CCNC: 15 U/L (ref 0–45)
BACTERIA #/AREA URNS HPF: ABNORMAL /HPF
BASOPHILS # BLD AUTO: 0 10E3/UL (ref 0–0.2)
BASOPHILS # BLD AUTO: 0.1 10E3/UL (ref 0–0.2)
BASOPHILS NFR BLD AUTO: 1 %
BASOPHILS NFR BLD AUTO: 1 %
BILIRUB SERPL-MCNC: 0.5 MG/DL
BILIRUB UR QL STRIP: NEGATIVE
BUN SERPL-MCNC: 34.6 MG/DL (ref 8–23)
BUN SERPL-MCNC: 35 MG/DL (ref 8–23)
CALCIUM SERPL-MCNC: 9.4 MG/DL (ref 8.2–9.6)
CALCIUM SERPL-MCNC: 9.8 MG/DL (ref 8.2–9.6)
CHLORIDE SERPL-SCNC: 102 MMOL/L (ref 98–107)
CHLORIDE SERPL-SCNC: 125 MMOL/L (ref 98–107)
COLOR UR AUTO: ABNORMAL
CREAT BLD-MCNC: 2 MG/DL (ref 0.5–1)
CREAT SERPL-MCNC: 1.72 MG/DL (ref 0.51–0.95)
CREAT SERPL-MCNC: 1.75 MG/DL (ref 0.51–0.95)
DEPRECATED HCO3 PLAS-SCNC: 20 MMOL/L (ref 22–29)
DEPRECATED HCO3 PLAS-SCNC: 21 MMOL/L (ref 22–29)
EGFRCR SERPLBLD CKD-EPI 2021: 23 ML/MIN/1.73M2
EGFRCR SERPLBLD CKD-EPI 2021: 27 ML/MIN/1.73M2
EGFRCR SERPLBLD CKD-EPI 2021: 28 ML/MIN/1.73M2
EOSINOPHIL # BLD AUTO: 0.1 10E3/UL (ref 0–0.7)
EOSINOPHIL # BLD AUTO: 0.1 10E3/UL (ref 0–0.7)
EOSINOPHIL NFR BLD AUTO: 1 %
EOSINOPHIL NFR BLD AUTO: 1 %
ERYTHROCYTE [DISTWIDTH] IN BLOOD BY AUTOMATED COUNT: 17.1 % (ref 10–15)
ERYTHROCYTE [DISTWIDTH] IN BLOOD BY AUTOMATED COUNT: 17.2 % (ref 10–15)
GLUCOSE BLDC GLUCOMTR-MCNC: 126 MG/DL (ref 70–99)
GLUCOSE BLDC GLUCOMTR-MCNC: 149 MG/DL (ref 70–99)
GLUCOSE SERPL-MCNC: 129 MG/DL (ref 70–99)
GLUCOSE SERPL-MCNC: 174 MG/DL (ref 70–99)
GLUCOSE UR STRIP-MCNC: NEGATIVE MG/DL
HCT VFR BLD AUTO: 33.6 % (ref 35–47)
HCT VFR BLD AUTO: 43.2 % (ref 35–47)
HGB BLD-MCNC: 10.4 G/DL (ref 11.7–15.7)
HGB BLD-MCNC: 13.1 G/DL (ref 11.7–15.7)
HGB UR QL STRIP: ABNORMAL
HOLD SPECIMEN: NORMAL
IMM GRANULOCYTES # BLD: 0 10E3/UL
IMM GRANULOCYTES # BLD: 0.1 10E3/UL
IMM GRANULOCYTES NFR BLD: 0 %
IMM GRANULOCYTES NFR BLD: 1 %
KETONES UR STRIP-MCNC: NEGATIVE MG/DL
LACTATE SERPL-SCNC: 1.8 MMOL/L (ref 0.7–2)
LEUKOCYTE ESTERASE UR QL STRIP: ABNORMAL
LYMPHOCYTES # BLD AUTO: 1.7 10E3/UL (ref 0.8–5.3)
LYMPHOCYTES # BLD AUTO: 1.7 10E3/UL (ref 0.8–5.3)
LYMPHOCYTES NFR BLD AUTO: 17 %
LYMPHOCYTES NFR BLD AUTO: 21 %
MCH RBC QN AUTO: 25.7 PG (ref 26.5–33)
MCH RBC QN AUTO: 26.3 PG (ref 26.5–33)
MCHC RBC AUTO-ENTMCNC: 30.3 G/DL (ref 31.5–36.5)
MCHC RBC AUTO-ENTMCNC: 31 G/DL (ref 31.5–36.5)
MCV RBC AUTO: 85 FL (ref 78–100)
MCV RBC AUTO: 85 FL (ref 78–100)
MONOCYTES # BLD AUTO: 0.7 10E3/UL (ref 0–1.3)
MONOCYTES # BLD AUTO: 0.8 10E3/UL (ref 0–1.3)
MONOCYTES NFR BLD AUTO: 7 %
MONOCYTES NFR BLD AUTO: 9 %
MUCOUS THREADS #/AREA URNS LPF: PRESENT /LPF
NEUTROPHILS # BLD AUTO: 5.6 10E3/UL (ref 1.6–8.3)
NEUTROPHILS # BLD AUTO: 7.4 10E3/UL (ref 1.6–8.3)
NEUTROPHILS NFR BLD AUTO: 68 %
NEUTROPHILS NFR BLD AUTO: 73 %
NITRATE UR QL: NEGATIVE
NRBC # BLD AUTO: 0 10E3/UL
NRBC # BLD AUTO: 0 10E3/UL
NRBC BLD AUTO-RTO: 0 /100
NRBC BLD AUTO-RTO: 0 /100
NT-PROBNP SERPL-MCNC: 3302 PG/ML (ref 0–1800)
PH UR STRIP: 5 [PH] (ref 5–7)
PLATELET # BLD AUTO: 255 10E3/UL (ref 150–450)
PLATELET # BLD AUTO: 340 10E3/UL (ref 150–450)
POTASSIUM SERPL-SCNC: 3.1 MMOL/L (ref 3.4–5.3)
POTASSIUM SERPL-SCNC: 4.3 MMOL/L (ref 3.4–5.3)
PROCALCITONIN SERPL IA-MCNC: 0.09 NG/ML
PROT SERPL-MCNC: 8.2 G/DL (ref 6.4–8.3)
RBC # BLD AUTO: 3.96 10E6/UL (ref 3.8–5.2)
RBC # BLD AUTO: 5.09 10E6/UL (ref 3.8–5.2)
RBC URINE: 84 /HPF
SODIUM SERPL-SCNC: 113 MMOL/L (ref 135–145)
SODIUM SERPL-SCNC: 137 MMOL/L (ref 135–145)
SP GR UR STRIP: 1.02 (ref 1–1.03)
SQUAMOUS EPITHELIAL: 1 /HPF
TROPONIN T SERPL HS-MCNC: 32 NG/L
TROPONIN T SERPL HS-MCNC: 36 NG/L
UROBILINOGEN UR STRIP-MCNC: NORMAL MG/DL
WBC # BLD AUTO: 8.2 10E3/UL (ref 4–11)
WBC # BLD AUTO: 9.9 10E3/UL (ref 4–11)
WBC CLUMPS #/AREA URNS HPF: PRESENT /HPF
WBC URINE: >182 /HPF

## 2023-11-25 PROCEDURE — 93005 ELECTROCARDIOGRAM TRACING: CPT

## 2023-11-25 PROCEDURE — 82310 ASSAY OF CALCIUM: CPT | Performed by: EMERGENCY MEDICINE

## 2023-11-25 PROCEDURE — 250N000011 HC RX IP 250 OP 636: Mod: JZ | Performed by: EMERGENCY MEDICINE

## 2023-11-25 PROCEDURE — 250N000009 HC RX 250: Performed by: EMERGENCY MEDICINE

## 2023-11-25 PROCEDURE — G0378 HOSPITAL OBSERVATION PER HR: HCPCS

## 2023-11-25 PROCEDURE — 83605 ASSAY OF LACTIC ACID: CPT | Performed by: NURSE PRACTITIONER

## 2023-11-25 PROCEDURE — 96376 TX/PRO/DX INJ SAME DRUG ADON: CPT

## 2023-11-25 PROCEDURE — 99207 PR APP CREDIT; MD BILLING SHARED VISIT: CPT | Performed by: NURSE PRACTITIONER

## 2023-11-25 PROCEDURE — 99207 PR NO BILLABLE SERVICE THIS VISIT: CPT | Performed by: NURSE PRACTITIONER

## 2023-11-25 PROCEDURE — 99418 PROLNG IP/OBS E/M EA 15 MIN: CPT | Performed by: INTERNAL MEDICINE

## 2023-11-25 PROCEDURE — 210N000002 HC R&B HEART CARE

## 2023-11-25 PROCEDURE — 80053 COMPREHEN METABOLIC PANEL: CPT | Performed by: EMERGENCY MEDICINE

## 2023-11-25 PROCEDURE — 258N000003 HC RX IP 258 OP 636: Performed by: EMERGENCY MEDICINE

## 2023-11-25 PROCEDURE — 84145 PROCALCITONIN (PCT): CPT | Performed by: NURSE PRACTITIONER

## 2023-11-25 PROCEDURE — 84484 ASSAY OF TROPONIN QUANT: CPT | Performed by: NURSE PRACTITIONER

## 2023-11-25 PROCEDURE — 82962 GLUCOSE BLOOD TEST: CPT

## 2023-11-25 PROCEDURE — 87086 URINE CULTURE/COLONY COUNT: CPT | Performed by: EMERGENCY MEDICINE

## 2023-11-25 PROCEDURE — 258N000003 HC RX IP 258 OP 636: Performed by: INTERNAL MEDICINE

## 2023-11-25 PROCEDURE — 85025 COMPLETE CBC W/AUTO DIFF WBC: CPT | Performed by: EMERGENCY MEDICINE

## 2023-11-25 PROCEDURE — 83880 ASSAY OF NATRIURETIC PEPTIDE: CPT | Performed by: EMERGENCY MEDICINE

## 2023-11-25 PROCEDURE — 84484 ASSAY OF TROPONIN QUANT: CPT | Performed by: EMERGENCY MEDICINE

## 2023-11-25 PROCEDURE — 71250 CT THORAX DX C-: CPT

## 2023-11-25 PROCEDURE — 82565 ASSAY OF CREATININE: CPT

## 2023-11-25 PROCEDURE — 250N000013 HC RX MED GY IP 250 OP 250 PS 637: Performed by: INTERNAL MEDICINE

## 2023-11-25 PROCEDURE — 258N000003 HC RX IP 258 OP 636: Performed by: NURSE PRACTITIONER

## 2023-11-25 PROCEDURE — 85025 COMPLETE CBC W/AUTO DIFF WBC: CPT | Performed by: NURSE PRACTITIONER

## 2023-11-25 PROCEDURE — 36415 COLL VENOUS BLD VENIPUNCTURE: CPT | Performed by: EMERGENCY MEDICINE

## 2023-11-25 PROCEDURE — 99223 1ST HOSP IP/OBS HIGH 75: CPT | Performed by: INTERNAL MEDICINE

## 2023-11-25 PROCEDURE — 99285 EMERGENCY DEPT VISIT HI MDM: CPT | Mod: 25

## 2023-11-25 PROCEDURE — 36415 COLL VENOUS BLD VENIPUNCTURE: CPT | Performed by: NURSE PRACTITIONER

## 2023-11-25 PROCEDURE — 81001 URINALYSIS AUTO W/SCOPE: CPT | Performed by: EMERGENCY MEDICINE

## 2023-11-25 PROCEDURE — 250N000012 HC RX MED GY IP 250 OP 636 PS 637: Performed by: INTERNAL MEDICINE

## 2023-11-25 PROCEDURE — 96366 THER/PROPH/DIAG IV INF ADDON: CPT

## 2023-11-25 PROCEDURE — 250N000011 HC RX IP 250 OP 636: Mod: JZ | Performed by: INTERNAL MEDICINE

## 2023-11-25 PROCEDURE — 96365 THER/PROPH/DIAG IV INF INIT: CPT

## 2023-11-25 RX ORDER — NALOXONE HYDROCHLORIDE 0.4 MG/ML
0.4 INJECTION, SOLUTION INTRAMUSCULAR; INTRAVENOUS; SUBCUTANEOUS
Status: DISCONTINUED | OUTPATIENT
Start: 2023-11-25 | End: 2023-11-29 | Stop reason: HOSPADM

## 2023-11-25 RX ORDER — NALOXONE HYDROCHLORIDE 0.4 MG/ML
0.2 INJECTION, SOLUTION INTRAMUSCULAR; INTRAVENOUS; SUBCUTANEOUS
Status: DISCONTINUED | OUTPATIENT
Start: 2023-11-25 | End: 2023-11-29 | Stop reason: HOSPADM

## 2023-11-25 RX ORDER — SALIVA STIMULANT COMB. NO.3
2 SPRAY, NON-AEROSOL (ML) MUCOUS MEMBRANE 4 TIMES DAILY PRN
Status: DISCONTINUED | OUTPATIENT
Start: 2023-11-25 | End: 2023-11-29 | Stop reason: HOSPADM

## 2023-11-25 RX ORDER — AMOXICILLIN 250 MG
1 CAPSULE ORAL 2 TIMES DAILY PRN
Status: DISCONTINUED | OUTPATIENT
Start: 2023-11-25 | End: 2023-11-25

## 2023-11-25 RX ORDER — CLOPIDOGREL BISULFATE 75 MG/1
75 TABLET ORAL DAILY
Status: DISCONTINUED | OUTPATIENT
Start: 2023-11-26 | End: 2023-11-29 | Stop reason: HOSPADM

## 2023-11-25 RX ORDER — CEFTRIAXONE 2 G/1
2 INJECTION, POWDER, FOR SOLUTION INTRAMUSCULAR; INTRAVENOUS EVERY 24 HOURS
Status: DISCONTINUED | OUTPATIENT
Start: 2023-11-26 | End: 2023-11-29 | Stop reason: HOSPADM

## 2023-11-25 RX ORDER — GLIMEPIRIDE 2 MG/1
2 TABLET ORAL
Status: DISCONTINUED | OUTPATIENT
Start: 2023-11-25 | End: 2023-11-29 | Stop reason: HOSPADM

## 2023-11-25 RX ORDER — ONDANSETRON 2 MG/ML
4 INJECTION INTRAMUSCULAR; INTRAVENOUS EVERY 30 MIN PRN
Status: DISCONTINUED | OUTPATIENT
Start: 2023-11-25 | End: 2023-11-25

## 2023-11-25 RX ORDER — DEXTROSE MONOHYDRATE 25 G/50ML
25-50 INJECTION, SOLUTION INTRAVENOUS
Status: DISCONTINUED | OUTPATIENT
Start: 2023-11-25 | End: 2023-11-26

## 2023-11-25 RX ORDER — BUPROPION HYDROCHLORIDE 100 MG/1
100 TABLET, EXTENDED RELEASE ORAL DAILY
Status: DISCONTINUED | OUTPATIENT
Start: 2023-11-26 | End: 2023-11-29 | Stop reason: HOSPADM

## 2023-11-25 RX ORDER — ONDANSETRON 4 MG/1
4 TABLET, ORALLY DISINTEGRATING ORAL EVERY 6 HOURS PRN
Status: DISCONTINUED | OUTPATIENT
Start: 2023-11-25 | End: 2023-11-29 | Stop reason: HOSPADM

## 2023-11-25 RX ORDER — CEFTRIAXONE 1 G/1
1 INJECTION, POWDER, FOR SOLUTION INTRAMUSCULAR; INTRAVENOUS EVERY 24 HOURS
Status: DISCONTINUED | OUTPATIENT
Start: 2023-11-26 | End: 2023-11-25

## 2023-11-25 RX ORDER — ACETAMINOPHEN 500 MG
1000 TABLET ORAL 3 TIMES DAILY
Status: DISCONTINUED | OUTPATIENT
Start: 2023-11-25 | End: 2023-11-29 | Stop reason: HOSPADM

## 2023-11-25 RX ORDER — OXYCODONE HYDROCHLORIDE 5 MG/1
5 TABLET ORAL DAILY PRN
Status: DISCONTINUED | OUTPATIENT
Start: 2023-11-25 | End: 2023-11-26

## 2023-11-25 RX ORDER — DILTIAZEM HYDROCHLORIDE 5 MG/ML
25 INJECTION INTRAVENOUS ONCE
Status: COMPLETED | OUTPATIENT
Start: 2023-11-25 | End: 2023-11-25

## 2023-11-25 RX ORDER — LIDOCAINE 40 MG/G
CREAM TOPICAL
Status: DISCONTINUED | OUTPATIENT
Start: 2023-11-25 | End: 2023-11-29 | Stop reason: HOSPADM

## 2023-11-25 RX ORDER — PANTOPRAZOLE SODIUM 20 MG/1
20 TABLET, DELAYED RELEASE ORAL DAILY
Status: DISCONTINUED | OUTPATIENT
Start: 2023-11-26 | End: 2023-11-29 | Stop reason: HOSPADM

## 2023-11-25 RX ORDER — HYDRALAZINE HYDROCHLORIDE 25 MG/1
25 TABLET, FILM COATED ORAL 2 TIMES DAILY
Status: DISCONTINUED | OUTPATIENT
Start: 2023-11-25 | End: 2023-11-29 | Stop reason: HOSPADM

## 2023-11-25 RX ORDER — KETOROLAC TROMETHAMINE 15 MG/ML
15 INJECTION, SOLUTION INTRAMUSCULAR; INTRAVENOUS ONCE
Status: COMPLETED | OUTPATIENT
Start: 2023-11-25 | End: 2023-11-25

## 2023-11-25 RX ORDER — AMOXICILLIN 250 MG
2 CAPSULE ORAL 2 TIMES DAILY PRN
Status: DISCONTINUED | OUTPATIENT
Start: 2023-11-25 | End: 2023-11-25

## 2023-11-25 RX ORDER — DULOXETIN HYDROCHLORIDE 60 MG/1
60 CAPSULE, DELAYED RELEASE ORAL DAILY
Status: DISCONTINUED | OUTPATIENT
Start: 2023-11-25 | End: 2023-11-29 | Stop reason: HOSPADM

## 2023-11-25 RX ORDER — IOPAMIDOL 755 MG/ML
81 INJECTION, SOLUTION INTRAVASCULAR ONCE
Status: DISCONTINUED | OUTPATIENT
Start: 2023-11-25 | End: 2023-11-25

## 2023-11-25 RX ORDER — NICOTINE POLACRILEX 4 MG
15-30 LOZENGE BUCCAL
Status: DISCONTINUED | OUTPATIENT
Start: 2023-11-25 | End: 2023-11-26

## 2023-11-25 RX ORDER — FUROSEMIDE 20 MG
20 TABLET ORAL DAILY
Status: DISCONTINUED | OUTPATIENT
Start: 2023-11-26 | End: 2023-11-29 | Stop reason: HOSPADM

## 2023-11-25 RX ORDER — METOPROLOL SUCCINATE 100 MG/1
100 TABLET, EXTENDED RELEASE ORAL 2 TIMES DAILY
Status: DISCONTINUED | OUTPATIENT
Start: 2023-11-25 | End: 2023-11-29 | Stop reason: ALTCHOICE

## 2023-11-25 RX ORDER — ONDANSETRON 2 MG/ML
4 INJECTION INTRAMUSCULAR; INTRAVENOUS EVERY 6 HOURS PRN
Status: DISCONTINUED | OUTPATIENT
Start: 2023-11-25 | End: 2023-11-29 | Stop reason: HOSPADM

## 2023-11-25 RX ADMIN — KETOROLAC TROMETHAMINE 15 MG: 15 INJECTION, SOLUTION INTRAMUSCULAR; INTRAVENOUS at 15:09

## 2023-11-25 RX ADMIN — TIZANIDINE 4 MG: 4 TABLET ORAL at 15:09

## 2023-11-25 RX ADMIN — MICONAZOLE NITRATE: 2 POWDER TOPICAL at 20:08

## 2023-11-25 RX ADMIN — MENTHOL 1 PATCH: 205.5 PATCH TOPICAL at 20:08

## 2023-11-25 RX ADMIN — DULOXETINE HYDROCHLORIDE 60 MG: 60 CAPSULE, DELAYED RELEASE ORAL at 17:27

## 2023-11-25 RX ADMIN — DILTIAZEM HYDROCHLORIDE 25 MG: 5 INJECTION INTRAVENOUS at 12:19

## 2023-11-25 RX ADMIN — ACETAMINOPHEN 1000 MG: 500 TABLET, FILM COATED ORAL at 17:24

## 2023-11-25 RX ADMIN — DILTIAZEM HYDROCHLORIDE 5 MG/HR: 5 INJECTION INTRAVENOUS at 12:45

## 2023-11-25 RX ADMIN — GLIMEPIRIDE 2 MG: 2 TABLET ORAL at 18:30

## 2023-11-25 RX ADMIN — TIZANIDINE 4 MG: 4 TABLET ORAL at 20:08

## 2023-11-25 RX ADMIN — DILTIAZEM HYDROCHLORIDE 25 MG: 5 INJECTION INTRAVENOUS at 14:37

## 2023-11-25 RX ADMIN — INSULIN ASPART 1 UNITS: 100 INJECTION, SOLUTION INTRAVENOUS; SUBCUTANEOUS at 18:29

## 2023-11-25 RX ADMIN — APIXABAN 2.5 MG: 2.5 TABLET, FILM COATED ORAL at 20:08

## 2023-11-25 RX ADMIN — SODIUM CHLORIDE 500 ML: 9 INJECTION, SOLUTION INTRAVENOUS at 17:11

## 2023-11-25 RX ADMIN — SODIUM CHLORIDE 250 ML: 9 INJECTION, SOLUTION INTRAVENOUS at 22:15

## 2023-11-25 RX ADMIN — ACETAMINOPHEN 1000 MG: 500 TABLET, FILM COATED ORAL at 21:38

## 2023-11-25 ASSESSMENT — ACTIVITIES OF DAILY LIVING (ADL)
ADLS_ACUITY_SCORE: 37
ADLS_ACUITY_SCORE: 37
ADLS_ACUITY_SCORE: 47
ADLS_ACUITY_SCORE: 47
ADLS_ACUITY_SCORE: 37
ADLS_ACUITY_SCORE: 37

## 2023-11-25 NOTE — ED TRIAGE NOTES
Pt comes in c/o back pain after doing heavy lifting on Tuesday. Upon checking v/s pt is in afib rvr     Triage Assessment (Adult)       Row Name 11/25/23 1146          Triage Assessment    Airway WDL WDL        Respiratory WDL    Respiratory WDL WDL        Skin Circulation/Temperature WDL    Skin Circulation/Temperature WDL WDL        Cardiac WDL    Cardiac WDL WDL        Peripheral/Neurovascular WDL    Peripheral Neurovascular WDL WDL        Cognitive/Neuro/Behavioral WDL    Cognitive/Neuro/Behavioral WDL WDL

## 2023-11-25 NOTE — H&P
[facility]    History and Physical  Hospitalist       Date of Admission:  11/25/2023    Assessment & Plan   Moira Andre is a 89 year old female with PMH of HFpEF, recently diagnosed atrial fibrillation, CKD, DM2, MDD, ELIGIO, who presents with muscle spasms and atrial fibrillation with RVR.   Other than muscle spasms patient states she felt well, however initial BMP showed significant hyponatremia and so patient admitted from ED. However on repeat, sodium level was normal and previous level spurious. However given ongoing pain and tachycardia, will admit overnight for pain and heart rate control.    Back muscle spasms  Patient reports straining back two weeks ago then developing muscle spasms 4 days ago. Describes musculoskeletal pain affected by movement, range of motion in back. She also reports a history of muscle spasms and her current symptoms are very similar. Given Toradol x1 in ED and started on tizanidine.  - Increase PTA Tylenol to 1000mg TID  - Schedule Tizanidine 4mg TID (has tolerated this and Baclofen before)  - Camphor-menthol patches on back  - Heat therapy back  - Continue PTA oxycodone  - PT ordered  - Obs admission    Atrial fibrillation with RVR  Recently diagnosed last month and admitted for this, at the time associated with hypoxic respiratory failure, pulm edema, and HFpEF. Patient presents with HR 150s in afib with RVR, in the setting of ongoing muscle spasms above and not taking her PTA meds. CT no evidence of pulmonary edema, BNP is down from a month ago. Patient does not appear to be in any significant heart failure. Heart rates significantly improved with diltiazem drip and bolus in ED, HR down to 70s  - Continue diltiazem drip overnight  - Resume PTA apixaban, plavix  - Resume metoprolol    Hyponatremia, lab error.    HFpEF  HTN, HLD  - Resume PTA metoprolol, hydralazine, lasix    Dirty urinalysis  Right renal stone noted on CT  Patient denies dysuria or flank,  "so will not pursue further  - OOutpatient follow up    CKD3  At baseline Cr 1.7    DM2: Initial BG is 174  - MDSSI  - Resume PTA glimepiride    MDD  - PTA bupropion, duloxetine    ELIGIO: Continue CPAP    Clinically Significant Risk Factors Present on Admission        # Hypokalemia: Lowest K = 3.1 mmol/L in last 2 days, will replace as needed  # Hyponatremia: Lowest Na = 113 mmol/L in last 2 days, will monitor as appropriate       # Drug Induced Coagulation Defect: home medication list includes an anticoagulant medication  # Drug Induced Platelet Defect: home medication list includes an antiplatelet medication   # Hypertension: Noted on problem list     # DMII: A1C = 7.9 % (Ref range: 0.0 - 5.6 %) within past 6 months    # Severe Obesity: Estimated body mass index is 41.85 kg/m  as calculated from the following:    Height as of 11/16/23: 1.702 m (5' 7\").    Weight as of 11/16/23: 121.2 kg (267 lb 3.2 oz).       # Asthma: noted on problem list         PT/OT: ordered  Diet: cardiac and diabetic  DVT Prophylaxis: apixaban  Charles Catheter: Not present  Lines: None     Cardiac Monitoring: None  Code Status: Full  Disposition: Anticipated discharge probably tomorrow back home    Jozef Oquendo MD  Hospitalist Service  [facility]  Securely message with [employer] (more info)  Text page via [facility] Paging/Directory     Primary Care Physician   Maryan Churchill    Chief Complaint   Muscle spasms    History is obtained from the patient  Case discussed with ED provider    History of Present Illness   Moira Andre is a 89 year old female who presents with muscle spasms. She reports straining her back about two weeks ago. Then four days ago she was moving things around when she suddenly developed muscle spasms in the right mid back. She describes it as a sharp pain that worsens with very specific movements, that has more or less prevented her from getting around as much. She has not been able to get " around her house and has not eaten much, and has not been taken her meds regularly for the past four days. She has tried some old prescription of muscle relaxers with limited benefit. Has not been sleeping well. She reports that her muscle spasms is the only thing that she came in for; she denies new SOB (has some chronic HENDERSON), denies dysuria, denies abdominal pain, chest pain or palpitations.    Past Medical History    I have reviewed this patient's medical history and updated it with pertinent information if needed.   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 Taking?   DULoxetine (CYMBALTA) 60 MG capsule   No No   Sig: Take 1 capsule (60 mg) by mouth daily   Menthol (Topical Analgesic) 7.5 % (Roll) MISC  Self Yes No   Sig: Apply to affected area every morning   Multiple Vitamins-Minerals (HAIR SKIN AND NAILS FORMULA PO)   Yes No   Sig: Take 1 tablet by mouth daily   Vitamin D3 (CHOLECALCIFEROL) 25 mcg (1000 units) tablet  Self Yes No   Sig: Take 100 Units by mouth daily   acetaminophen (TYLENOL) 500 MG tablet   Yes No   Sig: Take 1,000 mg by mouth 2 times daily   albuterol (PROAIR HFA/PROVENTIL HFA/VENTOLIN HFA) 108 (90 Base) MCG/ACT inhaler   Yes No   Sig: Inhale 2 puffs into the lungs every 6 hours as needed for shortness of breath, wheezing or cough For shortness of breath   apixaban ANTICOAGULANT (ELIQUIS) 2.5 MG tablet   No No   Sig: Take 1 tablet (2.5 mg) by mouth 2 times daily   atorvastatin (LIPITOR) 20 MG tablet   No No   Sig: TAKE 1 TABLET BY MOUTH DAILY FOR CHOLESTEROL   buPROPion (WELLBUTRIN SR) 100 MG 12 hr tablet   No No   Sig: Take 1 tablet (100 mg) by mouth daily for mood   calcium carbonate (TUMS) 500 MG chewable tablet   Yes No   Sig: Take 1 chew tab by mouth 2 times daily as needed for heartburn   camphor-menthol-methyl salicylate 3.1-6-10 % PTCH   Yes No   Sig: Externally apply 1 patch topically as needed (hand pain)   clopidogrel (PLAVIX) 75 MG tablet   No No   Sig: TAKE 1 TABLET BY MOUTH DAILY   fexofenadine (ALLEGRA) 180 MG tablet   Yes No   Sig: Take 180 mg by mouth every evening   fluticasone (FLONASE) 50 MCG/ACT nasal spray  Self Yes No   Sig: Spray 1 spray  into both nostrils daily as needed    fluticasone-salmeterol (WIXELA INHUB) 100-50 MCG/ACT inhaler   No No   Sig: USE 1 INHALATION BY MOUTH EVERY  12 HOURS   Patient taking differently: 1 puff daily USE 1 INHALATION BY MOUTH EVERY  12 HOURS   furosemide (LASIX) 20 MG tablet   No No   Sig: Take 1 tablet (20 mg) by mouth daily   glimepiride (AMARYL) 2 MG tablet   No No   Sig: Take 1 tablet (2 mg) by mouth 2 times daily (before meals)   hydrALAZINE (APRESOLINE) 25 MG tablet   No No   Sig: TAKE 1 TABLET BY MOUTH TWICE  DAILY FOR BLOOD PRESSURE   metoprolol succinate ER (TOPROL XL) 100 MG 24 hr tablet   No No   Sig: Take 1 tablet (100 mg) by mouth 2 times daily   nitroGLYcerin (NITROSTAT) 0.4 MG sublingual tablet   No No   Sig: FOR CHEST PAIN PLACE 1 TABLET  UNDER TONGUE EVERY 5 MINUTES FOR 3 DOSES. IF SYMPTOMS PERSIST 5  MINUTES AFTER 1ST DOSE CALL 911   oxyCODONE (ROXICODONE) 5 MG tablet   No No   Sig: Take 1 tablet (5 mg) by mouth daily as needed for pain   pantoprazole (PROTONIX) 20 MG EC tablet   No No   Sig: Take 1 tablet (20 mg) by mouth daily   triamcinolone (KENALOG) 0.1 % external cream   No No   Sig: Apply topically 2 times daily   Patient taking differently: Apply topically as needed for irritation      Facility-Administered Medications: None     Allergies   Allergies   Allergen Reactions    Aspirin Hives     Reaction occurred during childhood.     Lisinopril Cough    Losartan      Hyperkalemia      Metformin      Elevated lactic acid    Minocycline      Yellow Dye Allergy. Minocycline has Yellow Dye #10.    Mounjaro [Tirzepatide] Diarrhea and GI Disturbance    Salicylates Hives    Yellow Dye Hives     Rxn to yellow tablet. Eyes swelled shut.     Yellow Dyes (Non-Tartrazine) Hives       Social History   I have reviewed this patient's social history and updated it with pertinent information if needed. Moira RAJI Andre  reports that she quit smoking about 50 years ago. Her smoking use included cigarettes. She  started smoking about 51 years ago. She has a 0.25 pack-year smoking history. She has never used smokeless tobacco. She reports current alcohol use. She reports that she does not use drugs.    Family History   I have reviewed this patient's family history and updated it with pertinent information if needed.   Family History   Problem Relation Age of Onset    Neurologic Disorder Mother         MS - at 60's    C.A.D. Father          at 8o's, ? prostate ca    Breast Cancer No family hx of     Cancer - colorectal No family hx of        Review of Systems   The 10 point Review of Systems is negative other than noted in the HPI or here.    Physical Exam   Temp: 97.7  F (36.5  C) Temp src: Oral BP: (!) 147/88 Pulse: (!) 154   Resp: 25 SpO2: 97 %      Vital Signs with Ranges  Temp:  [97.7  F (36.5  C)] 97.7  F (36.5  C)  Pulse:  [140-154] 154  Resp:  [22-25] 25  BP: (147)/(88) 147/88  SpO2:  [97 %] 97 %  0 lbs 0 oz    Constitutional: Obese female, appears tired, overall in NAD  Eyes: PERRL, nonicteric, normal ocular movements  HEENT: Normocephalic, atraumatic, oral mucosa moist  Respiratory: CTAB, no wheezing or crackles  Cardiovascular: Irregularly irregular, normal S1/2, systolic murmur  GI: Hernia noted, nontender  Vascular: 1-2+ right lower extremity pitting edema  Skin: No rashes  Musculoskeletal: Moves all extremities  Neurologic: A&Ox3  Psychiatric: Appropriate affect and mood    Data   Data reviewed today:  I personally reviewed labwork, EKG.  Recent Labs   Lab 23  1215   WBC 9.9   HGB 13.1   MCV 85      *   POTASSIUM 3.1*   CHLORIDE 125*   CO2 20*   BUN 35.0*   CR 1.75*  2.0*   ANIONGAP <1*   TELLY 9.8*   *   ALBUMIN 4.2   PROTTOTAL 8.2   BILITOTAL 0.5   ALKPHOS 83   ALT 9   AST 15       Imaging:  Recent Results (from the past 24 hour(s))   CT Chest w/o Contrast    Narrative    EXAM: CT CHEST W/O CONTRAST  LOCATION: [facility]  DATE:  11/25/2023    INDICATION: upper back pain, PE, DISSECTION  COMPARISON: 10/31/2018  TECHNIQUE: CT chest without IV contrast. Multiplanar reformats were obtained. Dose reduction techniques were used.  CONTRAST: None.    FINDINGS:   LUNGS AND PLEURA: Bibasilar atelectasis. No focal consolidation or effusion.    MEDIASTINUM/AXILLAE: Heart is normal in size. No mediastinal, axillary, or hilar adenopathy. Nonenlarged mediastinal lymph nodes are noted. Grossly no dissection identified.      CORONARY ARTERY CALCIFICATION: Severe.    UPPER ABDOMEN: Partially visualized stone noted within the left collecting system measuring approximately 1.1 cm.Large cyst noted within the inferior pole of the right kidney.  Benign and needs no further follow-up.    MUSCULOSKELETAL: Degenerative changes of the spine. Old right rib fractures.      Impression    IMPRESSION:   1.  Limited examination due to lack of IV contrast. Cannot assess for PE or dissection. Grossly no dissection is identified.  2.  Large stone noted within the right collecting system which is partially visualized.  3.  Bibasilar atelectasis with no focal consolidation to suggest pneumonia.

## 2023-11-25 NOTE — PHARMACY-ADMISSION MEDICATION HISTORY
Pharmacist Admission Medication History    Admission medication history is complete. The information provided in this note is only as accurate as the sources available at the time of the update.    Information Source(s): Patient and CareEverywhere/SureScripts via in-person    Pertinent Information: Patient states taking duloxetine and hydralazine, despite fill history from 7/2023 and 4/2023, respectively. Patient states she has filled Eliquis Rx but hastn't started taking. She says she is prescribed metoprolol BID but has taken daily for the last 5-6 days. Patient confirms no longer taking ibandronate, last filled 9/2023 #90ds.     Changes made to PTA medication list:  Added: None  Deleted: None  Changed: clarified Wixela patient taking different dosing, metoprolol BID --> daily (per patient), vitamin D3 100 units --> 25 mcg (transcription error)    Medication Affordability:  Not including over the counter (OTC) medications, was there a time in the past 3 months when you did not take your medications as prescribed because of cost?:  (not addressed)    Allergies reviewed with patient and updates made in EHR: no - already reviewed     Medication History Completed By: Maria L Estrada [facility] 11/25/2023 4:29 PM    Prior to Admission medications    Medication Sig Last Dose Taking? Auth Provider   acetaminophen (TYLENOL) 500 MG tablet Take 1,000 mg by mouth 2 times daily 11/25/2023 at AM Yes Reported, Patient   albuterol (PROAIR HFA/PROVENTIL HFA/VENTOLIN HFA) 108 (90 Base) MCG/ACT inhaler Inhale 2 puffs into the lungs every 6 hours as needed for shortness of breath, wheezing or cough For shortness of breath 11/24/2023 at PRN Yes Nikolas Vang MD   atorvastatin (LIPITOR) 20 MG tablet TAKE 1 TABLET BY MOUTH DAILY FOR CHOLESTEROL 11/23/2023 at PM Yes Maryan Churchill MD   buPROPion (WELLBUTRIN SR) 100 MG 12 hr tablet Take 1 tablet (100 mg) by mouth daily for mood 11/23/2023 at PM Yes Maryan Churchill MD    calcium carbonate (TUMS) 500 MG chewable tablet Take 1 chew tab by mouth 2 times daily as needed for heartburn Unknown at PRN Yes Unknown, Entered By History   camphor-menthol-methyl salicylate 3.1-6-10 % PTCH Externally apply 1 patch topically as needed (hand pain) Unknown at PRN, rare use Yes Reported, Patient   clopidogrel (PLAVIX) 75 MG tablet TAKE 1 TABLET BY MOUTH DAILY Past Week at PM Yes Maryan Churchill MD   DULoxetine (CYMBALTA) 60 MG capsule Take 1 capsule (60 mg) by mouth daily Past Week at PM Yes Maryan Churchill MD   fexofenadine (ALLEGRA) 180 MG tablet Take 180 mg by mouth every evening Past Week at PM Yes Reported, Patient   fluticasone (FLONASE) 50 MCG/ACT nasal spray Spray 1 spray into both nostrils daily as needed  Unknown at PRN Yes Reported, Patient   fluticasone-salmeterol (WIXELA INHUB) 100-50 MCG/ACT inhaler USE 1 INHALATION BY MOUTH EVERY  12 HOURS  Patient taking differently: Inhale 1 puff into the lungs daily 11/25/2023 at AM Yes Maryan Churchill MD   furosemide (LASIX) 20 MG tablet Take 1 tablet (20 mg) by mouth daily Past Week at PM Yes Maryan Churchill MD   glimepiride (AMARYL) 2 MG tablet Take 1 tablet (2 mg) by mouth 2 times daily (before meals) 11/25/2023 at AM Yes Maryan Churchill MD   hydrALAZINE (APRESOLINE) 25 MG tablet TAKE 1 TABLET BY MOUTH TWICE  DAILY FOR BLOOD PRESSURE 11/25/2023 at AM Yes Maryan Churchill MD   Menthol (Topical Analgesic) 7.5 % (Roll) MISC Apply to affected area every morning 11/25/2023 at AM Yes Unknown, Entered By History   metoprolol succinate ER (TOPROL XL) 100 MG 24 hr tablet Take 1 tablet (100 mg) by mouth 2 times daily  Patient taking differently: Take 100 mg by mouth daily 11/24/2023 at PM Yes Maryan Churchill MD   Multiple Vitamins-Minerals (HAIR SKIN AND NAILS FORMULA PO) Take 1 tablet by mouth daily 11/24/2023 at PM Yes Reported, Patient   nitroGLYcerin (NITROSTAT) 0.4 MG sublingual tablet FOR CHEST PAIN PLACE 1 TABLET  UNDER TONGUE EVERY  "5 MINUTES FOR 3 DOSES. IF SYMPTOMS PERSIST 5  MINUTES AFTER 1ST DOSE CALL 911 Unknown at PRN, \"long time\" ago Yes Maryan Churchill MD   oxyCODONE (ROXICODONE) 5 MG tablet Take 1 tablet (5 mg) by mouth daily as needed for pain Past Week at PRN Yes Maryan Churchill MD   pantoprazole (PROTONIX) 20 MG EC tablet Take 1 tablet (20 mg) by mouth daily 11/25/2023 at AM Yes Maryan Churchill MD   triamcinolone (KENALOG) 0.1 % external cream Apply topically 2 times daily  Patient taking differently: Apply topically as needed for irritation Unknown at PRN Yes Maryan Churchill MD   Vitamin D3 (CHOLECALCIFEROL) 25 mcg (1000 units) tablet Take 25 mcg by mouth daily 11/24/2023 at PM Yes Reported, Patient   apixaban ANTICOAGULANT (ELIQUIS) 2.5 MG tablet Take 1 tablet (2.5 mg) by mouth 2 times daily  at not yet started, filled 11/7/23  Maryan Churchill MD         "

## 2023-11-25 NOTE — PROVIDER NOTIFICATION
MD Notification    Notified Person: MD    Notified Person Name:    Notification Date/Time: 11/25/23 0703    Notification Interaction: Vocera paging    Purpose of Notification:   -Arrived from ED with SBP's in the 70's on dilt gtt at 15mg/hr.  Currently drip off, Bp 72/42, MAP 56.    -Can we get [facility]MC orders  -Orders for miconazole powder for yeasty folds  -Direction for evening dose of Toprol XL with low BP    Orders Received: 500mL NS bolus.  Hold afternoon dose of Toprol XL.

## 2023-11-25 NOTE — ED NOTES
[facility]  ED Nurse Handoff Report    ED Chief complaint: Back Pain      ED Diagnosis:   Final diagnoses:   Atrial fibrillation with rapid ventricular response (H)   Hyponatremia       Code Status: not addressed     Allergies:   Allergies   Allergen Reactions    Aspirin Hives     Reaction occurred during childhood.     Lisinopril Cough    Losartan      Hyperkalemia      Metformin      Elevated lactic acid    Minocycline      Yellow Dye Allergy. Minocycline has Yellow Dye #10.    Mounjaro [Tirzepatide] Diarrhea and GI Disturbance    Salicylates Hives    Yellow Dye Hives     Rxn to yellow tablet. Eyes swelled shut.     Yellow Dyes (Non-Tartrazine) Hives       Patient Story: pt hs been having mid to right sided back pain since Tuesday, not taking meds because she doesn't feel good, found to be in rima here   Focused Assessment:  as above       Treatments and/or interventions provided: diltiazem,     Patient's response to treatments and/or interventions: initially slowed from bolus, up to 15 mg on drip at this time      To be done/followed up on inpatient unit:  unknown     Does this patient have any cognitive concerns?:  none     Activity level - Baseline/Home:  Walker  Activity Level - Current:   Unknown    Patient's Preferred language: English   Needed?: No    Isolation: None  Infection: Not Applicable  Patient tested for COVID 19 prior to admission: NO  Bariatric?: No    Vital Signs:   Vitals:    11/25/23 1142 11/25/23 1201   BP: (!) 147/88    Pulse: (!) 140 (!) 154   Resp: 22 25   Temp: 97.7  F (36.5  C)    TempSrc: Oral    SpO2:  97%       Cardiac Rhythm:     Was the PSS-3 completed:   Yes  What interventions are required if any?               Family Comments:   OBS brochure/video discussed/provided to patient/family:               Name of person given brochure if not patient:               Relationship to patient:     For the majority of the shift this patient's behavior was .    Behavioral interventions performed were .    ED NURSE PHONE NUMBER: [phone]

## 2023-11-25 NOTE — PROGRESS NOTES
Observation goals  PRIOR TO DISCHARGE        -diagnostic tests and consults completed and resulted - NOT MET  -vital signs normal or at patient baseline - NOT MET

## 2023-11-25 NOTE — ED PROVIDER NOTES
"  History     Chief Complaint:  Back Pain     The history is provided by the patient.      Moira \"Kristyn\" RAJI Andre is a 89 year old female anticoagulated on Eliquis after being admitted last month for new onset atrial fibrillation who presents to the ED with a friend for evaluation of upper back pain. Kristyn reports ongoing back pain over the past couple of weeks that worsened after she went shopping and did heavy lifting 4 days ago. She states sneezing or moving worsens the pain. She denies chest pain, abdominal pain, numbness, tingling, or extremity weakness, radiation of the pain into her upper or lower extremities, shortness of breath, or palpitations. She notes she stopped taking her prescribed Eliquis 3-4 days ago, as she was not feeling well. She also mentions right lower extremity edema onset yesterday, which she believes is actually better today.    Independent Historian:   None - Patient Only    Review of External Notes:   I reviewed her discharge summary on 10/19/23.    Medications:    Eliquis  Lasix  Toprol XL  Lipitor  Wellbutrin SR  Plavix  Cymbalta  Allegra  Apresoline  Boniva  Protonix  Amaryl  Rybelsu    Past Medical History:   Osteoarthritis  CKD stage 3  CAD  Type 2 diabetes mellitus with diabetic neuropathy  TIA  MI  Hyperlipidemia  Ventral hernia  Intermittent asthma  MDD  ELIGIO on CPAP  Anemia  Hypertension  GERD  Bilateral edema of lower extremities  DANIS  Chronic pain  Nephrolithiasis  Tremor  Non-rheumatic mitral valve stenosis  Aortic valve sclerosis  CHF  Migraine  Coronary artery calcification  Lumbar radiculopathy  Back muscle spasm  CPAP/BiPAP dependent  Spinal stenosis lumbosacral region  Atrial fibrillation with RVR  Lactic acidosis    Past Surgical History:    Appendectomy  Cholecystectomy  Coronary angiography x2  Left heart catheterization  JURGEN  Right knee replacement  Bilateral carpal tunnel release  Right femoral artery pseudoaneurysm repair    Physical Exam   Patient Vitals for the " past 24 hrs:   BP Temp Temp src Pulse Resp SpO2   11/25/23 1201 -- -- -- (!) 154 25 97 %   11/25/23 1142 (!) 147/88 97.7  F (36.5  C) Oral (!) 140 22 --      Physical Exam  Nursing note and vitals reviewed.  Constitutional:  Oriented to person, place, and time. Cooperative.   HENT:   Nose:    Nose normal.   Mouth/Throat:   Mucous membranes are normal.   Eyes:    Conjunctivae normal and EOM are normal.      Pupils are equal, round, and reactive to light.   Neck:    Trachea normal.   Cardiovascular:  Tachycardic rate, irregularly irregular rhythm, normal heart sounds and normal pulses. No murmur heard.  Pulmonary/Chest:  Effort normal and breath sounds normal.   Abdominal:   Soft. Normal appearance and bowel sounds are normal.      There is no tenderness.      There is no rebound and no CVA tenderness.   Musculoskeletal:  Upper back is normal in appearance without any reproducible tenderness to palpation.  Mild edema to the right lower extremity.  Extremities atraumatic x 4.   Lymphadenopathy:  No cervical adenopathy.   Neurological:   Alert and oriented to person, place, and time. Normal strength.      No cranial nerve deficit or sensory deficit. GCS eye subscore is 4. GCS verbal subscore is 5. GCS motor subscore is 6.   Skin:    Skin is intact. No rash noted.   Psychiatric:   Normal mood and affect.     Emergency Department Course   ECG  ECG taken at 1145, ECG read at 1155  Atrial fibrillation with rapid ventricular response  Left axis deviation  Minimal voltage criteria for LVH, may be normal variant ([place] product)  Inferior infarct, age undetermined  Anterior infarct, age undetermined  ST and T wave abnormality, consider lateral ischemia  Abnormal ECG   ST and T wave abnormality now present as compared to prior, dated 10/10/2023.  Rate 147 bpm. AL interval * ms. QRS duration 92 ms. QT/QTc 268/419 ms. P-R-T axes * -40 122.     Imaging:  CT Chest w/o Contrast   Final Result   IMPRESSION:    1.  Limited  examination due to lack of IV contrast. Cannot assess for PE or dissection. Grossly no dissection is identified.   2.  Large stone noted within the right collecting system which is partially visualized.   3.  Bibasilar atelectasis with no focal consolidation to suggest pneumonia.            Report per radiology    Laboratory:  Labs Ordered and Resulted from Time of ED Arrival to Time of ED Departure   COMPREHENSIVE METABOLIC PANEL - Abnormal       Result Value    Sodium 113 (*)     Potassium 3.1 (*)     Carbon Dioxide (CO2) 20 (*)     Anion Gap <1 (*)     Urea Nitrogen 35.0 (*)     Creatinine 1.75 (*)     GFR Estimate 27 (*)     Calcium 9.8 (*)     Chloride 125 (*)     Glucose 174 (*)     Alkaline Phosphatase 83      AST 15      ALT 9      Protein Total 8.2      Albumin 4.2      Bilirubin Total 0.5     TROPONIN T, HIGH SENSITIVITY - Abnormal    Troponin T, High Sensitivity 32 (*)    NT PROBNP INPATIENT - Abnormal    N terminal Pro BNP Inpatient 3,302 (*)    CBC WITH PLATELETS AND DIFFERENTIAL - Abnormal    WBC Count 9.9      RBC Count 5.09      Hemoglobin 13.1      Hematocrit 43.2      MCV 85      MCH 25.7 (*)     MCHC 30.3 (*)     RDW 17.2 (*)     Platelet Count 340      % Neutrophils 73      % Lymphocytes 17      % Monocytes 7      % Eosinophils 1      % Basophils 1      % Immature Granulocytes 1      NRBCs per 100 WBC 0      Absolute Neutrophils 7.4      Absolute Lymphocytes 1.7      Absolute Monocytes 0.7      Absolute Eosinophils 0.1      Absolute Basophils 0.1      Absolute Immature Granulocytes 0.1      Absolute NRBCs 0.0     ISTAT CREATININE POCT - Abnormal    Creatinine POCT 2.0 (*)     GFR, ESTIMATED POCT 23 (*)    ROUTINE UA WITH MICROSCOPIC REFLEX TO CULTURE   ISTAT CREATININE POCT        Procedures   None    Emergency Department Course & Assessments:    Interventions:  Medications   diltiazem (CARDIZEM) 125 mg in sodium chloride 0.9 % 125 mL infusion (10 mg/hr Intravenous Rate/Dose Change 11/25/23 1340)    Saline (has no administration in time range)   iopamidol (ISOVUE-370) solution 81 mL (has no administration in time range)   diltiazem (CARDIZEM) injection 25 mg (25 mg Intravenous $Given 11/25/23 1219)        Assessments:  1201 I obtained history and examined the patient as noted above.     Independent Interpretation (X-rays, CTs, rhythm strip):  None    Consultations/Discussion of Management or Tests:  None       Social Determinants of Health affecting care:   Healthcare Access/Compliance    Disposition:  The patient was admitted to the hospital under the care of Dr. Oquendo.     Impression & Plan    CMS Diagnoses: None    Medical Decision Making:  This is a 89-year-old female who came in for further evaluation of upper back pain.  She also upon arrival here was in atrial fibrillation with RVR, although she did not feel it.  I do not feel comfortable cardioverting her due to the fact that she has been noncompliant with her Eliquis, although she has been taking her Plavix apparently and her Toprol.  I was concerned that her back pain might be from a PE or aortic dissection or possibly from cardiac ischemia my initial intention was to proceed with a CT of her chest with contrast.  However her GFR came back a little too low, and therefore I obtained a noncontrast CT to rule out anything obvious such as an abnormal aorta.  Her pain also could be secondary to musculoskeletal causes.  Her sodium came back extremely low though, and clearly she needs to be admitted to the hospital for that as well as her rapid atrial fibrillation.  She is still tachycardic despite being started on IV diltiazem, although she did initially slow down with the bolus.  I suspect that she became tachycardic again when they were trying to move her and she became upset.  Therefore we are going to provide her another IV bolus of diltiazem.  We are also going to repeat the basic metabolic panel in case that is a lab error.  Regardless, she needs to  come into the hospital for further evaluation and management of her atrial fibrillation with RVR.  I subsequently spoke with Dr. Oquendo, who will be taking care of her.    Diagnosis:    ICD-10-CM    1. Atrial fibrillation with rapid ventricular response (H)  I48.91       2. Hyponatremia  E87.1       3. Large intra-renal right kidney stone  N20.0       4. Upper back pain  M54.9               Scribe Disclosure:  I, Rosita Camacho, am serving as a scribe at 12:26 PM on 11/25/2023 to document services personally performed by Nathan Iglesias MD based on my observations and the provider's statements to me.   11/25/2023   Nathan Iglesias MD Lashkowitz, Seth H, MD  11/25/23 5040

## 2023-11-26 ENCOUNTER — APPOINTMENT (OUTPATIENT)
Dept: CT IMAGING | Facility: CLINIC | Age: 88
DRG: 872 | End: 2023-11-26
Attending: NURSE PRACTITIONER
Payer: COMMERCIAL

## 2023-11-26 ENCOUNTER — PREP FOR PROCEDURE (OUTPATIENT)
Dept: SURGERY | Facility: CLINIC | Age: 88
End: 2023-11-26
Payer: COMMERCIAL

## 2023-11-26 DIAGNOSIS — N20.0 NEPHROLITHIASIS: Primary | ICD-10-CM

## 2023-11-26 LAB
ANION GAP SERPL CALCULATED.3IONS-SCNC: 16 MMOL/L (ref 7–15)
ATRIAL RATE - MUSE: NORMAL BPM
BUN SERPL-MCNC: 36.9 MG/DL (ref 8–23)
CALCIUM SERPL-MCNC: 8.8 MG/DL (ref 8.2–9.6)
CHLORIDE SERPL-SCNC: 106 MMOL/L (ref 98–107)
CREAT SERPL-MCNC: 2.06 MG/DL (ref 0.51–0.95)
DEPRECATED HCO3 PLAS-SCNC: 18 MMOL/L (ref 22–29)
DIASTOLIC BLOOD PRESSURE - MUSE: NORMAL MMHG
EGFRCR SERPLBLD CKD-EPI 2021: 22 ML/MIN/1.73M2
ERYTHROCYTE [DISTWIDTH] IN BLOOD BY AUTOMATED COUNT: 17.2 % (ref 10–15)
GLUCOSE BLDC GLUCOMTR-MCNC: 110 MG/DL (ref 70–99)
GLUCOSE BLDC GLUCOMTR-MCNC: 116 MG/DL (ref 70–99)
GLUCOSE BLDC GLUCOMTR-MCNC: 130 MG/DL (ref 70–99)
GLUCOSE BLDC GLUCOMTR-MCNC: 210 MG/DL (ref 70–99)
GLUCOSE SERPL-MCNC: 116 MG/DL (ref 70–99)
HCT VFR BLD AUTO: 38.7 % (ref 35–47)
HGB BLD-MCNC: 11.8 G/DL (ref 11.7–15.7)
INTERPRETATION ECG - MUSE: NORMAL
MCH RBC QN AUTO: 25.7 PG (ref 26.5–33)
MCHC RBC AUTO-ENTMCNC: 30.5 G/DL (ref 31.5–36.5)
MCV RBC AUTO: 84 FL (ref 78–100)
P AXIS - MUSE: NORMAL DEGREES
PLATELET # BLD AUTO: 249 10E3/UL (ref 150–450)
POTASSIUM SERPL-SCNC: 4.6 MMOL/L (ref 3.4–5.3)
PR INTERVAL - MUSE: NORMAL MS
QRS DURATION - MUSE: 92 MS
QT - MUSE: 268 MS
QTC - MUSE: 419 MS
R AXIS - MUSE: -40 DEGREES
RBC # BLD AUTO: 4.6 10E6/UL (ref 3.8–5.2)
SODIUM SERPL-SCNC: 140 MMOL/L (ref 135–145)
SYSTOLIC BLOOD PRESSURE - MUSE: NORMAL MMHG
T AXIS - MUSE: 122 DEGREES
TROPONIN T SERPL HS-MCNC: 34 NG/L
VENTRICULAR RATE- MUSE: 147 BPM
WBC # BLD AUTO: 9.5 10E3/UL (ref 4–11)

## 2023-11-26 PROCEDURE — G0378 HOSPITAL OBSERVATION PER HR: HCPCS

## 2023-11-26 PROCEDURE — 250N000013 HC RX MED GY IP 250 OP 250 PS 637: Performed by: INTERNAL MEDICINE

## 2023-11-26 PROCEDURE — 250N000011 HC RX IP 250 OP 636: Mod: JZ | Performed by: NURSE PRACTITIONER

## 2023-11-26 PROCEDURE — 80048 BASIC METABOLIC PNL TOTAL CA: CPT | Performed by: INTERNAL MEDICINE

## 2023-11-26 PROCEDURE — 258N000003 HC RX IP 258 OP 636: Performed by: INTERNAL MEDICINE

## 2023-11-26 PROCEDURE — 272N000001 HC OR GENERAL SUPPLY STERILE: Performed by: UROLOGY

## 2023-11-26 PROCEDURE — 250N000011 HC RX IP 250 OP 636: Mod: JZ | Performed by: INTERNAL MEDICINE

## 2023-11-26 PROCEDURE — 93010 ELECTROCARDIOGRAM REPORT: CPT | Performed by: INTERNAL MEDICINE

## 2023-11-26 PROCEDURE — 210N000002 HC R&B HEART CARE

## 2023-11-26 PROCEDURE — 84484 ASSAY OF TROPONIN QUANT: CPT | Performed by: INTERNAL MEDICINE

## 2023-11-26 PROCEDURE — C1758 CATHETER, URETERAL: HCPCS | Performed by: UROLOGY

## 2023-11-26 PROCEDURE — 36415 COLL VENOUS BLD VENIPUNCTURE: CPT | Performed by: INTERNAL MEDICINE

## 2023-11-26 PROCEDURE — 99221 1ST HOSP IP/OBS SF/LOW 40: CPT | Mod: GC | Performed by: UROLOGY

## 2023-11-26 PROCEDURE — 85027 COMPLETE CBC AUTOMATED: CPT | Performed by: INTERNAL MEDICINE

## 2023-11-26 PROCEDURE — 74176 CT ABD & PELVIS W/O CONTRAST: CPT

## 2023-11-26 PROCEDURE — 99233 SBSQ HOSP IP/OBS HIGH 50: CPT | Performed by: INTERNAL MEDICINE

## 2023-11-26 PROCEDURE — 258N000003 HC RX IP 258 OP 636: Performed by: NURSE PRACTITIONER

## 2023-11-26 PROCEDURE — 93005 ELECTROCARDIOGRAM TRACING: CPT

## 2023-11-26 PROCEDURE — 99207 PR NO BILLABLE SERVICE THIS VISIT: CPT | Performed by: NURSE PRACTITIONER

## 2023-11-26 RX ORDER — MAGNESIUM HYDROXIDE/ALUMINUM HYDROXICE/SIMETHICONE 120; 1200; 1200 MG/30ML; MG/30ML; MG/30ML
15 SUSPENSION ORAL
Status: COMPLETED | OUTPATIENT
Start: 2023-11-26 | End: 2023-11-26

## 2023-11-26 RX ORDER — METOPROLOL TARTRATE 25 MG/1
25 TABLET, FILM COATED ORAL 2 TIMES DAILY
Status: DISCONTINUED | OUTPATIENT
Start: 2023-11-26 | End: 2023-11-29 | Stop reason: HOSPADM

## 2023-11-26 RX ORDER — HYDROMORPHONE HCL IN WATER/PF 6 MG/30 ML
0.2 PATIENT CONTROLLED ANALGESIA SYRINGE INTRAVENOUS
Status: DISCONTINUED | OUTPATIENT
Start: 2023-11-26 | End: 2023-11-29 | Stop reason: HOSPADM

## 2023-11-26 RX ORDER — HYDROMORPHONE HCL IN WATER/PF 6 MG/30 ML
0.2 PATIENT CONTROLLED ANALGESIA SYRINGE INTRAVENOUS ONCE
Status: COMPLETED | OUTPATIENT
Start: 2023-11-26 | End: 2023-11-26

## 2023-11-26 RX ORDER — OXYCODONE HYDROCHLORIDE 5 MG/1
5 TABLET ORAL EVERY 6 HOURS PRN
Status: DISCONTINUED | OUTPATIENT
Start: 2023-11-26 | End: 2023-11-29 | Stop reason: HOSPADM

## 2023-11-26 RX ORDER — SODIUM CHLORIDE 9 MG/ML
INJECTION, SOLUTION INTRAVENOUS CONTINUOUS
Status: DISCONTINUED | OUTPATIENT
Start: 2023-11-26 | End: 2023-11-27

## 2023-11-26 RX ORDER — DEXTROSE MONOHYDRATE 25 G/50ML
25-50 INJECTION, SOLUTION INTRAVENOUS
Status: DISCONTINUED | OUTPATIENT
Start: 2023-11-26 | End: 2023-11-29

## 2023-11-26 RX ORDER — OXYCODONE HYDROCHLORIDE 5 MG/1
5 TABLET ORAL EVERY 4 HOURS PRN
Status: DISCONTINUED | OUTPATIENT
Start: 2023-11-26 | End: 2023-11-26

## 2023-11-26 RX ORDER — NICOTINE POLACRILEX 4 MG
15-30 LOZENGE BUCCAL
Status: DISCONTINUED | OUTPATIENT
Start: 2023-11-26 | End: 2023-11-29

## 2023-11-26 RX ORDER — METOPROLOL TARTRATE 25 MG/1
25 TABLET, FILM COATED ORAL 2 TIMES DAILY
Status: DISCONTINUED | OUTPATIENT
Start: 2023-11-26 | End: 2023-11-26

## 2023-11-26 RX ADMIN — TIZANIDINE 4 MG: 4 TABLET ORAL at 08:06

## 2023-11-26 RX ADMIN — MICONAZOLE NITRATE: 2 POWDER TOPICAL at 21:26

## 2023-11-26 RX ADMIN — OXYCODONE HYDROCHLORIDE 5 MG: 5 TABLET ORAL at 16:18

## 2023-11-26 RX ADMIN — METOPROLOL TARTRATE 25 MG: 25 TABLET, FILM COATED ORAL at 08:06

## 2023-11-26 RX ADMIN — ALUMINUM HYDROXIDE, MAGNESIUM HYDROXIDE, AND SIMETHICONE 15 ML: 200; 200; 20 SUSPENSION ORAL at 20:35

## 2023-11-26 RX ADMIN — ALUMINUM HYDROXIDE, MAGNESIUM HYDROXIDE, AND SIMETHICONE 15 ML: 200; 200; 20 SUSPENSION ORAL at 12:08

## 2023-11-26 RX ADMIN — MENTHOL 1 PATCH: 205.5 PATCH TOPICAL at 11:10

## 2023-11-26 RX ADMIN — METOPROLOL TARTRATE 25 MG: 25 TABLET, FILM COATED ORAL at 21:18

## 2023-11-26 RX ADMIN — CEFTRIAXONE SODIUM 2 G: 2 INJECTION, POWDER, FOR SOLUTION INTRAMUSCULAR; INTRAVENOUS at 00:45

## 2023-11-26 RX ADMIN — OXYCODONE HYDROCHLORIDE 5 MG: 5 TABLET ORAL at 23:06

## 2023-11-26 RX ADMIN — MENTHOL 1 PATCH: 205.5 PATCH TOPICAL at 23:06

## 2023-11-26 RX ADMIN — ACETAMINOPHEN 1000 MG: 500 TABLET, FILM COATED ORAL at 21:18

## 2023-11-26 RX ADMIN — DULOXETINE HYDROCHLORIDE 60 MG: 60 CAPSULE, DELAYED RELEASE ORAL at 08:09

## 2023-11-26 RX ADMIN — MICONAZOLE NITRATE: 2 POWDER TOPICAL at 11:06

## 2023-11-26 RX ADMIN — HYDROMORPHONE HYDROCHLORIDE 0.2 MG: 0.2 INJECTION, SOLUTION INTRAMUSCULAR; INTRAVENOUS; SUBCUTANEOUS at 06:01

## 2023-11-26 RX ADMIN — SODIUM CHLORIDE 500 ML: 9 INJECTION, SOLUTION INTRAVENOUS at 02:17

## 2023-11-26 RX ADMIN — SODIUM CHLORIDE 500 ML: 9 INJECTION, SOLUTION INTRAVENOUS at 00:45

## 2023-11-26 RX ADMIN — SODIUM CHLORIDE: 9 INJECTION, SOLUTION INTRAVENOUS at 13:48

## 2023-11-26 RX ADMIN — ACETAMINOPHEN 1000 MG: 500 TABLET, FILM COATED ORAL at 08:06

## 2023-11-26 RX ADMIN — ACETAMINOPHEN 1000 MG: 500 TABLET, FILM COATED ORAL at 15:48

## 2023-11-26 RX ADMIN — HYDROMORPHONE HYDROCHLORIDE 0.2 MG: 0.2 INJECTION, SOLUTION INTRAMUSCULAR; INTRAVENOUS; SUBCUTANEOUS at 18:08

## 2023-11-26 RX ADMIN — PANTOPRAZOLE SODIUM 20 MG: 20 TABLET, DELAYED RELEASE ORAL at 08:06

## 2023-11-26 RX ADMIN — SODIUM CHLORIDE 500 ML: 9 INJECTION, SOLUTION INTRAVENOUS at 11:06

## 2023-11-26 RX ADMIN — OXYCODONE HYDROCHLORIDE 5 MG: 5 TABLET ORAL at 08:06

## 2023-11-26 RX ADMIN — Medication 2 SPRAY: at 00:46

## 2023-11-26 RX ADMIN — BUPROPION HYDROCHLORIDE 100 MG: 100 TABLET, FILM COATED, EXTENDED RELEASE ORAL at 08:06

## 2023-11-26 ASSESSMENT — ACTIVITIES OF DAILY LIVING (ADL)
ADLS_ACUITY_SCORE: 47
ADLS_ACUITY_SCORE: 38
WALKING_OR_CLIMBING_STAIRS_DIFFICULTY: YES
ADLS_ACUITY_SCORE: 38
TOILETING_ISSUES: NO
CONCENTRATING,_REMEMBERING_OR_MAKING_DECISIONS_DIFFICULTY: NO
WEAR_GLASSES_OR_BLIND: YES
EQUIPMENT_CURRENTLY_USED_AT_HOME: WALKER, ROLLING
DOING_ERRANDS_INDEPENDENTLY_DIFFICULTY: NO
DRESSING/BATHING_DIFFICULTY: NO
ADLS_ACUITY_SCORE: 47
ADLS_ACUITY_SCORE: 47
DIFFICULTY_EATING/SWALLOWING: NO
CHANGE_IN_FUNCTIONAL_STATUS_SINCE_ONSET_OF_CURRENT_ILLNESS/INJURY: YES
ADLS_ACUITY_SCORE: 38
ADLS_ACUITY_SCORE: 38
FALL_HISTORY_WITHIN_LAST_SIX_MONTHS: NO
VISION_MANAGEMENT: GLASSES
WALKING_OR_CLIMBING_STAIRS: AMBULATION DIFFICULTY, REQUIRES EQUIPMENT
ADLS_ACUITY_SCORE: 38

## 2023-11-26 NOTE — PLAN OF CARE
Goal Outcome Evaluation:  Neuro- A&OX4, anxious and crying at times. Also [place] and slightly forgetful   Most Recent Vitals- Temp: 97.5  F (36.4  C) Temp src: Oral BP: 107/78 Pulse: 64    SpO2: 99 % O2 Device: None (Room air)   Tele/Cardiac- A-fib CVR 'S   Resp- RA   Activity- UP with assist of 1-2 and walker   Pain- c/o right flank and back pain prn oxycodone given with good relief.   Drips- NS 125cc/hr   Drains/Tubes- P-IVX2  Skin- Scattered bruising & excoriated red abd folds and bilateral breasts   GI/- Low urine out put with negative bladder scan  Aggression Color- Green  COVID status- Negative  Plan- ureteral stent today   Misc- Keep NPO    Mireya Lilly RN

## 2023-11-26 NOTE — PLAN OF CARE
"  Problem: Adult Inpatient Plan of Care  Goal: Plan of Care Review  Description: The Plan of Care Review/Shift note should be completed every shift.  The Outcome Evaluation is a brief statement about your assessment that the patient is improving, declining, or no change.  This information will be displayed automatically on your shift  note.  11/26/2023 0549 by Frannie Ta RN  Outcome: Not Progressing  Flowsheets (Taken 11/26/2023 0530)  Outcome Evaluation: Patient is here with afib RVR and had difficulities with hypotension during the night. RRT was called. Labs were obtained. Several NS fluid boluses were given. BP improved. CT of abdomen and pelvis obtained. Currently NPO pending surgical intervention for infected right kidney stone.  Plan of Care Reviewed With: patient  Overall Patient Progress: declining  11/26/2023 0545 by Frannie aT RN  Flowsheets  Taken 11/26/2023 0545  Outcome Evaluation: BP improved. CT of abdomen and pelvis obtained. Patient is currently NPO pending need for surgical intervention for infected right kidney stone.  Taken 11/26/2023 0530  Outcome Evaluation: Patient is here with afib RVR and had difficulities with hypotension during the night. RRT was called. Labs were obtained. Several NS fluid boluses were given. BP improved. CT of abdomen and pelvis obtained. Currently NPO pending surgical intervention for infected right kidney stone.  Plan of Care Reviewed With: patient  Overall Patient Progress: declining  Goal: Patient-Specific Goal (Individualized)  Description: You can add care plan individualizations to a care plan. Examples of Individualization might be:  \"Parent requests to be called daily at 9am for status\", \"I have a hard time hearing out of my right ear\", or \"Do not touch me to wake me up as it startles  me\".  Flowsheets (Taken 11/26/2023 0530)  Anxieties, Fears or Concerns: anxious about new diagnoses.  Goal: Absence of Hospital-Acquired Illness or " Injury  Intervention: Identify and Manage Fall Risk  Recent Flowsheet Documentation  Taken 11/26/2023 0400 by Frannie Ta RN  Safety Promotion/Fall Prevention:   activity supervised   assistive device/personal items within reach   clutter free environment maintained   increase visualization of patient   nonskid shoes/slippers when out of bed   patient and family education   room near nurse's station   room organization consistent   safety round/check completed   treat reversible contributory factors  Taken 11/26/2023 0000 by Frannie Ta RN  Safety Promotion/Fall Prevention:   activity supervised   assistive device/personal items within reach   clutter free environment maintained   increase visualization of patient   nonskid shoes/slippers when out of bed   patient and family education   room near nurse's station   room organization consistent   safety round/check completed   treat reversible contributory factors  Taken 11/25/2023 2000 by Frannie Ta RN  Safety Promotion/Fall Prevention:   activity supervised   assistive device/personal items within reach   clutter free environment maintained   increase visualization of patient   nonskid shoes/slippers when out of bed   patient and family education   room near nurse's station   room organization consistent   safety round/check completed   treat reversible contributory factors  Intervention: Prevent Skin Injury  Recent Flowsheet Documentation  Taken 11/26/2023 0400 by Frannie Ta RN  Body Position:   turned   left   legs elevated  Taken 11/26/2023 0200 by Frannie Ta RN  Body Position: weight shifting  Taken 11/26/2023 0000 by Frannie Ta RN  Body Position: (moved to a transfer cart) other (see comments)  Taken 11/25/2023 2200 by Frannie Ta RN  Body Position: weight shifting  Taken 11/25/2023 2000 by Frannie Ta RN  Body Position: weight shifting  Intervention: Prevent Infection  Recent Flowsheet  Documentation  Taken 11/26/2023 0400 by Frannie Ta RN  Infection Prevention:   rest/sleep promoted   single patient room provided   hand hygiene promoted  Taken 11/26/2023 0000 by Frannie Ta RN  Infection Prevention:   rest/sleep promoted   single patient room provided   hand hygiene promoted  Taken 11/25/2023 2000 by Frannie Ta RN  Infection Prevention:   rest/sleep promoted   single patient room provided   hand hygiene promoted  Goal: Optimal Comfort and Wellbeing  Intervention: Monitor Pain and Promote Comfort  Recent Flowsheet Documentation  Taken 11/26/2023 0400 by Frannie Ta RN  Pain Management Interventions: repositioned  Intervention: Provide Person-Centered Care  Recent Flowsheet Documentation  Taken 11/26/2023 0400 by Frannie Ta RN  Trust Relationship/Rapport:   care explained   choices provided   thoughts/feelings acknowledged   questions answered   empathic listening provided  Taken 11/26/2023 0000 by Frannie Ta RN  Trust Relationship/Rapport:   care explained   choices provided   thoughts/feelings acknowledged   questions answered   empathic listening provided  Taken 11/25/2023 2000 by Frannie Ta RN  Trust Relationship/Rapport:   care explained   choices provided   thoughts/feelings acknowledged   questions answered   empathic listening provided   Goal Outcome Evaluation:      Plan of Care Reviewed With: patient    Overall Patient Progress: declining    Outcome Evaluation: BP improved. CT of abdomen and pelvis obtained. Patient is currently NPO pending need for surgical intervention for infected right kidney stone.

## 2023-11-26 NOTE — PROVIDER NOTIFICATION
MD Notification    Notified Person: MD    Notified Person Name: Dr. Bert pandya     Notification Date/Time:11/26/23 10:10am    Notification Interaction: Maria     Purpose of Notification: Low BP 80/47    Orders Received:    Comments:

## 2023-11-26 NOTE — PROGRESS NOTES
Brief note:    Met with and assessed patient.  She is alert, conversant, no increased work of breathing or hypoxia.  Blood pressures are 97 systolic currently with heart rate of 125 off of diltiazem drip.    She has right flank pain, and a CT demonstrates renal stone with surrounding inflammation concerning for infected stone and associated pyelonephritis.  No hydronephrosis.  Abnormal urinalysis.    Patient is on ceftriaxone currently, does not appear toxic.    Order placed for n.p.o. status, will update urology if they are called overnight from the emergency department.  At some point patient will require likely percutaneous nephrolithotomy for treatment of infected stone, but will pursue clinical stability currently    Discussed with house officer.  Consider esmolol rather than diltiazem for rate control given labile blood pressures.  Would continue to administer fluids, as patient is not hypoxic at this time    Isak Damian MD  2:19 AM    I did have a chance to discuss w/ urology overnight (as a heads up). BP remains improved in the 100 systolic range.    Note patient was on apixiban anticoagulation, and it looks like this was started 11/25 at 20:00. I believe this was her first dose, as she had not started this medication per pharmacy reconciliation in the emergency department.  further doses of apixaban on hold.    Isak Damian MD  3:22 AM

## 2023-11-26 NOTE — PLAN OF CARE
Goal Outcome Evaluation:    DATE & TIME: 11/25/23 Admit from ED - 1900    COGNITION/BEHAVIOR: A&Ox4  Vital signs:  Temp: 97.7  F (36.5  C) Temp src: Oral BP: (!) 72/41 Pulse: 96   Resp: 24 SpO2: 98 % via RA  TELEMETRY RHYTHM: A-Fib  MOBILITY: SBA with 1-2, gait belt.  Has personal 4WW.  PAIN: Denies.  Back strain/pain is better.  DIET: Cardiac diet.  Hadn't ate for two days before coming in.  Ate a full dinner.  RESP: Lung sounds clear.  No reports of shortness of breath.  CV: Irregular rate and rhythm.  No reports of chest pain.  GI/: Up to commode for bowel movement with any dizziness  SKIN: Pale.  Redness with yeasty odor to pannus.  Some rashy redness to gluteal cleft.  LINES: PIV's, Purewick.  Pressure too low to continue diltiazem drip.  Was on briefly following 1/2 liter NS bolus, then pressure dropped too low again.  MD aware.  LAB/BG:   OTHER: [facility] status

## 2023-11-26 NOTE — PROVIDER NOTIFICATION
MD Notification    Notified Person: MD    Notified Person Name: Jere    Notification Date/Time: 11/25/23 1840    Notification Interaction: Vocera paging    Purpose of Notification: SBP briefly in the 100's as bolus was finishing. Dilt restarted at 10mg/hr per protocol. SBP then 60-70's. Drip stopped.  Has returned to 105/72 after a short period of time off the drip.  Restart at 5mg/hr?    Orders Received: Stop drip if HR under 120    Comments: Have checked BP's on both arms with no significant difference between the two.

## 2023-11-26 NOTE — PROGRESS NOTES
[facility]    Medicine Progress Note - Hospitalist Service    Date of Admission:  11/25/2023    Assessment & Plan   Moira Andre is a 89 year old female with PMH of HFpEF, recently diagnosed atrial fibrillation, CKD, DM2, MDD, ELIGIO, who presents with muscle spasms/right flank pain and atrial fibrillation with RVR.    CT imaging reviewed large right nephrolithasis (1 x 0.7cm) with fat stranding clinical concerning for right-sided pyelonephritis.      Overnight, she was persistently hypotensive and evaluated on several occasions (1750cc IVF bolues given overnight) for treatment of developing sepsis.  She continues on IV rocephin and IVF.  Was initially on diltiazem gtt for a fib with RVR, but currently off with only mild tachycardia currently.       Sepsis d/t right-sided pyelonephritis with possible associated infected stone    Currently BP and HR improved after IVF boluses given in the last few hours  She does, however, remain clinically guarded and requiring IMC care    Urology consulted requested and is pending  NPO for possible urologic procedure later today  Holding PTA plavix for now until seen by urology    2.  A fib with RVR    New clinical diagnosis in the last month  PTA meds include metoprolol 100mg po bid  Will trial decrease dose of metoprolol 25mg po bid with holding parameters if BP allows    Is chronically anticoagulated with DOAC    Echo 10/12/23 reviewed - mod mitral stenosis, EF 55%    3. Back muscle spasms    Unsure if related to right-sided kidney stone or more musculoskeletal.    HOLD flexeril, for now, due to tenuous BP    Continue with prn oxycodone cautiously, tylenol  Received one dose of IV toradol earlier in admission  Adding topical lidoderm patches and heating pad, as well    4.  Hyponatremia, lab error.  Sodium level has normalized     5. HFpEF  HTN, HLD  CAD  - Resume PTA metoprolol at decreased doses (as above)  Resume PLAVIX when ok per uroloyg    PMH  noted BMS x 2 to OM and diagonal in 2007. Most recent angiogram in 2017 did not reveal any significant obstructive disease.      6. CKD3  At baseline Cr 1.7  Creat worsening today despite IVF given overnight  D/w RN -  urine outpt in last 12 hours has been minimal    Will rebolus 500cc NS now and start IVF at continous rate  Will watch creat closely with urology procedure      7. DM2    Blood sugar in the 70-80's now that she is NPO  Will continue with sliding scale insulin prn while NPO  Will hold PTA  oral meds    8.  MDD  - PTA bupropion, duloxetine - continue  She is intermittently tearful this am     9. ELIGIO: Continue CPAP    ADDENDUM - 1155    Called by RN to assess pt as she c/o epigatric pain/chest pain.  EKG reviewed with a fib but no obvious ST elevations.       Pt tells me that  her pain is better.  Has heartburn similar to this and takes prn tums to relieve the pain.  No nausea or arm/jaw pain. Uncertain if she has had CAD or MI in the past.      Sleeping when I enter the room and appears comfortable, not diaphoretic.    Troponin requested and is pending now (noted to be 36 11/25/23).  Will consider a limited bedside echo to compare to 10/23 pending troponin.   GI cocktail will also be offered - was noted to have been given IV toradol on admission and remains NPO; did receive her po protonix this am.    Medical Decision Making       60 MINUTES SPENT BY ME on the date of service doing chart review, history, exam, documentation & further activities per the note.        PPE Worn:  Mask, gloves     Diet: NPO per Anesthesia Guidelines for Procedure/Surgery Except for: Meds    DVT Prophylaxis: PCDs until urology ok's to resume  Charles Catheter: Not present  Lines: None     Cardiac Monitoring: ACTIVE order. Indication: Tachyarrhythmias, acute (48 hours)  Code Status: Full Code      Clinically Significant Risk Factors Present on Admission        # Hypokalemia: Lowest K = 3.1 mmol/L in last 2 days, will replace as  "needed  # Hyponatremia: Lowest Na = 113 mmol/L in last 2 days, will monitor as appropriate       # Drug Induced Coagulation Defect: home medication list includes an anticoagulant medication  # Drug Induced Platelet Defect: home medication list includes an antiplatelet medication   # Hypertension: Noted on problem list     # DMII: A1C = 7.9 % (Ref range: 0.0 - 5.6 %) within past 6 months    # Severe Obesity: Estimated body mass index is 41.85 kg/m  as calculated from the following:    Height as of 11/16/23: 1.702 m (5' 7\").    Weight as of 11/16/23: 121.2 kg (267 lb 3.2 oz).       # Asthma: noted on problem list        Disposition Plan      Expected Discharge Date: 11/26/2023                  Rupal Ruvalcaba DO  Hospitalist Service  [facility]  Securely message with [employer] Access (more info)  Text page via [employer] Paging/Directory   ______________________________________________________________________    Interval History     Having intermittent pain  in  her right flank.  Worried about doctors \"findings so many things wrong with me now that I am 89\".  Currently not c/o CP or SOB.  HR slower.  No F/C, N/V reported.      Has not yet been up out of bed as vitals have not been stable enough overnight.      Physical Exam   Vital Signs: Temp: 97.5  F (36.4  C) Temp src: Oral BP: 102/61 Pulse: 102   Resp: 16 SpO2: 99 % O2 Device: None (Room air)    Weight: 0 lbs 0 oz    GEN:  Alert, awake, appears somewhat stressed being in the hospital currently but no overt respiratory distress.  HEENT:  Normocephalic/atraumatic, no scleral icterus, no nasal discharge,   CV:  irregular rate and rhythm.  S1 + S2  LUNGS:  Clear to auscultation ant/lat bilaterally without rales/rhonchi/wheezing/retractions.  Symmetric chest rise on inhalation noted.  ABD:  Active bowel sounds, soft, no reproduciltender to light palpation, not significantly distended.  No rebound/guarding/rigidity.  EXT:  trace pretibial edema " bilaterally.  No cyanosis.  No acute joint synovitis noted.  SKIN:  Dry to touch, no exanthems noted in the visualized areas.  Psych - cooperative, occasionally tearful    Medications    dilTIAZem Stopped (11/25/23 1832)    - MEDICATION INSTRUCTIONS -        acetaminophen  1,000 mg Oral TID    [Held by provider] apixaban ANTICOAGULANT  2.5 mg Oral BID    buPROPion  100 mg Oral Daily    cefTRIAXone  2 g Intravenous Q24H    clopidogrel  75 mg Oral Daily    DULoxetine  60 mg Oral Daily    [Held by provider] furosemide  20 mg Oral Daily    glimepiride  2 mg Oral BID AC    [Held by provider] hydrALAZINE  25 mg Oral BID    insulin aspart  1-7 Units Subcutaneous TID AC    insulin aspart  1-5 Units Subcutaneous At Bedtime    menthol  1 patch Topical BID    menthol   Transdermal Q8H MAGDALENA    [Held by provider] metoprolol succinate ER  100 mg Oral BID    miconazole   Topical BID    pantoprazole  20 mg Oral Daily    sodium chloride (PF)  3 mL Intracatheter Q8H    tiZANidine  4 mg Oral TID       Data     Labs and Imaging results below reviewed today.  Recent Labs   Lab 11/26/23  0744 11/25/23  2236 11/25/23  1215   WBC 9.5 8.2 9.9   HGB 11.8 10.4* 13.1   HCT 38.7 33.6* 43.2   MCV 84 85 85    255 340     Recent Labs   Lab 11/26/23  1256 11/26/23  0818 11/26/23  0744 11/25/23  1712 11/25/23  1442 11/25/23  1215   NA  --   --  140  --  137 113*   POTASSIUM  --   --  4.6  --  4.3 3.1*   CHLORIDE  --   --  106  --  102 125*   CO2  --   --  18*  --  21* 20*   ANIONGAP  --   --  16*  --  14 <1*   * 110* 116*   < > 129* 174*   BUN  --   --  36.9*  --  34.6* 35.0*   CR  --   --  2.06*  --  1.72* 1.75*  2.0*   GFRESTIMATED  --   --  22*  --  28* 23*  27*   TELLY  --   --  8.8  --  9.4 9.8*    < > = values in this interval not displayed.     Recent Labs   Lab 11/26/23  1256 11/26/23  0818 11/26/23  0744 11/25/23  1712 11/25/23  1442 11/25/23  1215   NA  --   --  140  --  137 113*   POTASSIUM  --   --  4.6  --  4.3 3.1*    CHLORIDE  --   --  106  --  102 125*   CO2  --   --  18*  --  21* 20*   ANIONGAP  --   --  16*  --  14 <1*   * 110* 116*   < > 129* 174*   BUN  --   --  36.9*  --  34.6* 35.0*   CR  --   --  2.06*  --  1.72* 1.75*  2.0*   GFRESTIMATED  --   --  22*  --  28* 23*  27*   TELLY  --   --  8.8  --  9.4 9.8*   PROTTOTAL  --   --   --   --   --  8.2   ALBUMIN  --   --   --   --   --  4.2   BILITOTAL  --   --   --   --   --  0.5   ALKPHOS  --   --   --   --   --  83   AST  --   --   --   --   --  15   ALT  --   --   --   --   --  9    < > = values in this interval not displayed.       Recent Results (from the past 24 hour(s))   CT Chest w/o Contrast    Narrative    EXAM: CT CHEST W/O CONTRAST  LOCATION: [facility]  DATE: 11/25/2023    INDICATION: upper back pain, PE, DISSECTION  COMPARISON: 10/31/2018  TECHNIQUE: CT chest without IV contrast. Multiplanar reformats were obtained. Dose reduction techniques were used.  CONTRAST: None.    FINDINGS:   LUNGS AND PLEURA: Bibasilar atelectasis. No focal consolidation or effusion.    MEDIASTINUM/AXILLAE: Heart is normal in size. No mediastinal, axillary, or hilar adenopathy. Nonenlarged mediastinal lymph nodes are noted. Grossly no dissection identified.      CORONARY ARTERY CALCIFICATION: Severe.    UPPER ABDOMEN: Partially visualized stone noted within the left collecting system measuring approximately 1.1 cm.Large cyst noted within the inferior pole of the right kidney.  Benign and needs no further follow-up.    MUSCULOSKELETAL: Degenerative changes of the spine. Old right rib fractures.      Impression    IMPRESSION:   1.  Limited examination due to lack of IV contrast. Cannot assess for PE or dissection. Grossly no dissection is identified.  2.  Large stone noted within the right collecting system which is partially visualized.  3.  Bibasilar atelectasis with no focal consolidation to suggest pneumonia.     CT Abdomen Pelvis w/o Contrast     Narrative    EXAM: CT ABDOMEN PELVIS W/O CONTRAST  LOCATION: [facility]  DATE: 11/26/2023    INDICATION: Large stone noted in the right renal collecting system, concerning for hydronephrosis  COMPARISON: None.  TECHNIQUE: CT scan of the abdomen and pelvis was performed without IV contrast. Multiplanar reformats were obtained. Dose reduction techniques were used.  CONTRAST: None.    FINDINGS:   LOWER CHEST: Mild scattered atelectasis. No consolidation or pleural effusion.    HEPATOBILIARY: Normal. Gallbladder is surgically absent.    PANCREAS: Normal.    SPLEEN: Normal.    ADRENAL GLANDS: Normal.    KIDNEYS/BLADDER: Multiple bilateral renal cysts measuring up to 4.8 cm in the right kidney appears benign and do not require follow-up. A 1 x 0.7 cm stone is seen in the right renal pelvis with marked adjacent fat stranding. No right hydronephrosis. A 1   mm nonobstructing stones also seen in the right kidney. No left hydroureteronephrosis. Bladder is decompressed and grossly unremarkable.    BOWEL: Colonic diverticulosis without acute diverticulitis. No focal bowel thickening or obstruction.    LYMPH NODES: Normal.    VASCULATURE: Mild aortoiliac atherosclerotic calcifications. No abdominal aortic aneurysm.    PELVIC ORGANS: Status post hysterectomy. No abnormal adnexal masses.    MUSCULOSKELETAL: Multilevel mild and moderate degenerative disc space narrowing with vacuum disc in the lower thoracic and lumbar spine. No suspicious osseous lesions or acute fractures. A large ventral abdominal hernia is present containing   nonobstructed colon and small bowel.        Impression    IMPRESSION:     1.  1 cm stone in the right renal pelvis with marked adjacent fat stranding indicating superimposed infection. Recommend correlation with urinalysis. No hydronephrosis.    2.  Additional 1 mm nonobstructing stone in the right kidney.    3.  Large ventral abdominal hernia containing nonobstructed colon and  small bowel.    4.  Colonic diverticulosis without acute diverticulitis.

## 2023-11-26 NOTE — PROGRESS NOTES
Urology Consult - 11/26/23    Name: Moira Andre    MRN: [id number]   YOB: 1933               Chief Complaint:   Right renal pelvis stone    History is obtained from the patient and chart review          History of Present Illness:   Moira Andre is a 89 year old female with HFpEF, Afib, CKD, DM2, MDD, ELIGIO who is admitted with several days of right sided back spasms and A Fib, right renal pelvis stone that may be intermittently obstructing for which urology is consulted.    She reports migratory right flank and abdominal pain. This pain is similar to her spasms, but continues to cause her significant discomfort. She has no blood in the urine, no pain or burning with urination, no signs/symptoms of kidney infection. She has never had kidney stones previously.    She is afebrile, intermittently tachycardic and intermittently hypotensive. Cr this AM uptrended to 2.0 from 1.7, normal WBC. UA is nitrite negative, leuk esterase positive. CT scan with renal pelvis stone without hydronephrosis          Past Medical History:     Past Medical History:   Diagnosis Date    Aortic valve sclerosis     heart murmur, no AS    Arrhythmia     PAT, PVC    Aspirin allergy     Plavix use long term    Asthma     CKD (chronic kidney disease) stage 3, GFR 30-59 ml/min (H)     x 2007 atleast    Congestive heart failure, unspecified     Depression     Diabetes mellitus (H) 2010    Diastolic dysfunction, left ventricle 2013    grade 2, nl ef    HTN (hypertension)     Lactic acidosis 08/2018    due to dehydration and metformin    Migraine headache     Mitral stenosis     mild, 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    Myocardial infarction (H) 9/2007, cath 2013 ml    BMS: stent to OM, diag, nl EF, echo /C angia 2013 , f/u cath no lesion >40%    Nephrolithiasis     right side    OA (osteoarthritis) of knee     Obesity     Rheumatoid arthritis flare (H)     prednisone    Sleep apnea     restarted using cpap 2017    TIA (transient ischaemic attack)      Ventral hernia, unspecified, without mention of obstruction or gangrene             Past Surgical History:     Past Surgical History:   Procedure Laterality Date    APPENDECTOMY      BIOPSY BREAST      x2 -needle & lumpectomy-benign    CHOLECYSTECTOMY      CORONARY ANGIOGRAPHY ADULT ORDER  2007    Bare metal stent to OM1, Diagonal patent     CORONARY ANGIOGRAPHY ADULT ORDER  2007    [place] stent to Diagonal    HC LEFT HEART CATHETERIZATION  2013    Moderate CAD    HYSTERECTOMY TOTAL ABDOMINAL      ORTHOPEDIC SURGERY      knee replacement on right side (), Left side ()    RELEASE CARPAL TUNNEL      right and left    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 Father          at 8o's, ? prostate ca    Breast Cancer No family hx of     Cancer - colorectal No family hx of             Allergies:     Allergies   Allergen Reactions    Aspirin Hives     Reaction occurred during childhood.     Lisinopril Cough    Losartan      Hyperkalemia      Metformin      Elevated lactic acid    Minocycline      Yellow Dye Allergy. Minocycline has Yellow Dye #10.    Mounjaro [Tirzepatide] Diarrhea and GI Disturbance    Salicylates Hives    Yellow Dye Hives     Rxn to yellow tablet. Eyes swelled shut.     Yellow Dyes (Non-Tartrazine) Hives            Medications:     Current Facility-Administered Medications   Medication    acetaminophen  (TYLENOL) tablet 1,000 mg    [Held by provider] apixaban ANTICOAGULANT (ELIQUIS) tablet 2.5 mg    artificial saliva (BIOTENE MT) solution 2 spray    buPROPion (WELLBUTRIN SR) 12 hr tablet 100 mg    cefTRIAXone (ROCEPHIN) 2 g vial to attach to  ml bag for ADULTS or NS 50 ml bag for PEDS    clopidogrel (PLAVIX) tablet 75 mg    glucose gel 15-30 g    Or    dextrose 50 % injection 25-50 mL    Or    glucagon injection 1 mg    glucose gel 15-30 g    Or    dextrose 50 % injection 25-50 mL    Or    glucagon injection 1 mg    diltiazem (CARDIZEM) 125 mg in sodium chloride 0.9 % 125 mL infusion    DULoxetine (CYMBALTA) DR capsule 60 mg    [Held by provider] furosemide (LASIX) tablet 20 mg    [Held by provider] glimepiride (AMARYL) tablet 2 mg    [Held by provider] hydrALAZINE (APRESOLINE) tablet 25 mg    insulin aspart (NovoLOG) injection (RAPID ACTING)    lidocaine (LMX4) cream    lidocaine 1 % 0.1-1 mL    menthol (ICY HOT) 5 % patch 1 patch    menthol (ICY HOY) Patch in Place    [Held by provider] metoprolol succinate ER (TOPROL XL) 24 hr tablet 100 mg    miconazole (MICATIN) 2 % powder    naloxone (NARCAN) injection 0.2 mg    Or    naloxone (NARCAN) injection 0.4 mg    Or    naloxone (NARCAN) injection 0.2 mg    Or    naloxone (NARCAN) injection 0.4 mg    ondansetron (ZOFRAN ODT) ODT tab 4 mg    Or    ondansetron (ZOFRAN) injection 4 mg    oxyCODONE (ROXICODONE) tablet 5 mg    pantoprazole (PROTONIX) EC tablet 20 mg    Patient is already receiving anticoagulation with heparin, enoxaparin (LOVENOX), warfarin (COUMADIN)  or other anticoagulant medication    sodium chloride (PF) 0.9% PF flush 3 mL    sodium chloride (PF) 0.9% PF flush 3 mL    tiZANidine (ZANAFLEX) tablet 4 mg             Physical Exam:   VS:  T: 97.5    HR: 122    BP: 115/65    RR: 18   GEN:  Awake, alert, responds to questions  CV:  No cyanosis  LUNGS: Non-labored breathing on room air  BACK:  No left flank tenderness, mild tenderness to palpation  "in right flank  EXT:  Warm, well perfused.    SKIN:  Warm.  Dry.  No rashes.  NEURO:  CN grossly intact.            Data:   All laboratory data reviewed:    Recent Labs   Lab 11/25/23  2236 11/25/23  1215   WBC 8.2 9.9   HGB 10.4* 13.1    340       Recent Labs   Lab 11/25/23  2125 11/25/23  1712 11/25/23  1442 11/25/23  1215   NA  --   --  137 113*   POTASSIUM  --   --  4.3 3.1*   CHLORIDE  --   --  102 125*   CO2  --   --  21* 20*   BUN  --   --  34.6* 35.0*   CR  --   --  1.72* 1.75*  2.0*   * 149* 129* 174*   TELLY  --   --  9.4 9.8*       Recent Labs   Lab 11/25/23  1253   COLOR Orange*   APPEARANCE Cloudy*   URINEGLC Negative   URINEBILI Negative   URINEKETONE Negative   SG 1.024   URINEPH 5.0   PROTEIN 100*   NITRITE Negative   LEUKEST Large*   RBCU 84*   WBCU >182*       All pertinent imaging reviewed:    CT scan of the abdomen:  \"IMPRESSION:      1.  1 cm stone in the right renal pelvis with marked adjacent fat stranding indicating superimposed infection. Recommend correlation with urinalysis. No hydronephrosis.     2.  Additional 1 mm nonobstructing stone in the right kidney.     3.  Large ventral abdominal hernia containing nonobstructed colon and small bowel.     4.  Colonic diverticulosis without acute diverticulitis.\"           Impression and Plan:   Impression:  89 year old female with multiple medical comorbidities, admitted for back spasms and Afib, intermittently hypotensive, UA concerning for infection with uptrending Cr, normal WBC, continues to have right flank pain, concerning for intermittently obstructing stone with CT scan demonstrating right renal pelvis stone    We discussed that based on imaging findings, stone may or may not be the current source of her pain, and it is unclear if stent placement would alleviate her symptoms. If we did proceed with surgery today, we would be unable to treat the stone and would have to return for another procedure to remove her kidney stones " given concern for infection. We discussed extensively risks, benefits and alternatives for stent placement.    Her goal is for relief of pain, and after discussing risks, benefits and alternatives, she would like to proceed with right ureteral stent placement. This would be the best way to proceed as symptomatic relief of pain with stent placement would suggest that intermittent obstruction has been source of her right flank pain    Plan:  - NPO  - Add on for right ureteral stent placement  - Urology will continue to follow    Seen and examined with staff, Dr. Lala Garcia MD  Urology Resident, PGY-5

## 2023-11-26 NOTE — UTILIZATION REVIEW
Admission Status; Secondary Review Determination         Under the authority of the Utilization Management Committee, the utilization review process indicated a secondary review on the above patient.  The review outcome is based on review of the medical records, discussions with staff, and applying clinical experience noted on the date of the review.        (x)      Inpatient Status Appropriate - This patient's medical care is consistent with medical management for inpatient care and reasonable inpatient medical practice.       RATIONALE FOR DETERMINATION   The patient is a 89-year-old female admitted on 11/25/2023.  Patient came in because of right flank and abdominal pain.  She was noted to have 1 cm stone in the right renal pelvis with marked adjacent fat stranding indicating superimposed infection.  Urology has been consulted and recommends benefit of stent placement which will likely happen tomorrow.  Patient was started on ceftriaxone for possible urinary tract infection.  Patient also had an episode of hypotension during the night and had a rapid response called with need for fluid administration.  Urine culture is pending.  Patient also has tachycardia with recent diagnosis of A-fib and is being treated for rhythm/rate control likely with diltiazem according to notes.  Based on most likely diagnosis for current right flank pain being infection with nephrolithiasis and treatment of stent placement being scheduled, agree with current inpatient status.      The severity of illness, intensity of service provided, expected LOS and risk for adverse outcome make the care complex, high risk and appropriate for hospital admission.        The information on this document is developed by the utilization review team in order for the business office to ensure compliance.  This only denotes the appropriateness of proper admission status and does not reflect the quality of care rendered.         The definitions of  Inpatient Status and Observation Status used in making the determination above are those provided in the CMS Coverage Manual, Chapter 1 and Chapter 6, section 70.4.      Sincerely,     Ronald Guthrie MD  Physician Advisor  Utilization Review/ Case Management  [facility].

## 2023-11-26 NOTE — PROVIDER NOTIFICATION
Pt c/o sudden chest pain/pressure Vs indigestion/heart burn 7/10. Stat EKG ordered and hopsitalist text paged.

## 2023-11-26 NOTE — PROVIDER NOTIFICATION
MD Notification    Notified Person: MD    Notified Person Name: Jordon    Notification Date/Time: 11/26/23 3116    Notification Interaction: [employer] messaging    Purpose of Notification: pt having pain right sided pain again from stone, oxy was just ordered once daily. Pt requesting more oxy.     Orders Received: oxy ordered    Comments:

## 2023-11-26 NOTE — CODE/RAPID RESPONSE
[facility]    House AMY RRT Note  11/25/2023   Time Called: 2153    RRT called for: Hypotension    Assessment & Plan     Asymptomatic hypotension suspect multifactorial 2/2 sepsis in setting of R sided pyelonephritis in setting of UTI with large R nephrolithiasis, hypovolemia in setting of recent decreased PO intake.  Oliguria likely 2/2 hypovolemia in setting of recent decreased PO intake.  1 cm stone in the right renal pelvis with marked adjacent fat stranding indicating superimposed infection.   1 mm nonobstructing stone in the right kidney.  Large ventral abdominal hernia containing nonobstructed colon and small bowel.  Colonic diverticulosis without acute diverticulitis.  - Upon arrival, pt lying in bed in trendelenburg position, awake, alert, in no overt distress with noted profoundly dry oral mucosa.  Nursing notes pt's BP continues to downtrend despite IV diltiazem being stopped at ~ 1830.  Pt received 500 ml NS bolus at ~ 1700 with noted improvement in pt's BP at that time.  Pt reports no current or recent chest pain, abdominal pain, dizziness, lightheadedness, SOB, N/V/D, fevers, chills, dysuria, foul smelling urine.  Pt notes due to severe upper back muscle spasm over the past 3 days, she has not been able to ambulate much, thus has had very minimal intake.  Noted pt has history of HFpEF and CKD.    INTERVENTIONS:  - Will judiciously order 250 ml NS over 1 hour in setting of asymptomatic hypotension, warm, dry, mentating  - Stat trop, lactic acid, CBC, procalcitonin  - Pt remains IM status  - After discussion with cross covering hospitalist, in setting of grossly abnormal UA with noted stone on imaging, will initiate pt on ceftriaxone 2g Q24H and obtain CT A/P WO to evaluate for hydronephrosis/obstructing stone  - UC in process  - Contacted by nursing at 2330 noting pt continues to have fluctuating BPs; will order an additional 500 ml NS bolus over 1 hour  - Bladder scan 8 ml  -  Will place PTA lasix, hydralazine and metoprolol on hold  - Confirmed FULL CODE status with pt    At the end of the RRT pt's SBP 90s, HR 90s-100s, mentating, reports no associated symptoms, benign appearing    Addendum: 0115: Noted CT results stating stone in renal pelvis with fat stranding indicating superimposed infection.  Contacted Dr. Damian, hospitalist, at this time to discuss further recommendations; noted will review imaging and contact writer.  Was contacted by Dr. Damian at 0138 noting Dr. Damian assessed pt at pt's bedside.  In setting of pt's overall clinical picture appearing stable/improving with SBP 90s-low 100s, OK to defer contacting urology overnight at this time.  Dr. Damian notes could trial esmolol instead of diltiazem if ongoing a-fib RVR.  Dr. Damian has ordered an additional 500 ml NS bolus over 1 hour (will be a total of 1.75L since 1700; 30 ml/kg = 3630, pt has HFpEF and CKD III), placed an NPO order.  Will consult [facility], appreciate recommendations.  Appreciate Dr. Damian's expertise and recommendations.      Discussed with and defer further cares to nursing and Dr. Damian, cross covering hospitalist    Interval History     Moira Andre is a 89 year old female who was admitted on 11/25/2023 for back spasm.    Medical history significant for:   Atrial fibrillation with RVR  Chronic congestive heart failure with preserved ejection fraction  Coronary artery disease  Moderate mitral stenosis  Moderate aortic stenosis  Hypertension  Hyperlipidemia  Asthma  Chronic kidney disease, stage IIIb  Diabetes mellitus, type 2  ELIGIO  Depression    Code Status: Full Code    Allergies   Allergies   Allergen Reactions    Aspirin Hives     Reaction occurred during childhood.     Lisinopril Cough    Losartan      Hyperkalemia      Metformin      Elevated lactic acid    Minocycline      Yellow Dye Allergy. Minocycline has Yellow Dye #10.    Mounjaro [Tirzepatide] Diarrhea and GI Disturbance    Salicylates  Hives    Yellow Dye Hives     Rxn to yellow tablet. Eyes swelled shut.     Yellow Dyes (Non-Tartrazine) Hives       Physical Exam   Vital Signs with Ranges:  Temp:  [97.4  F (36.3  C)-97.7  F (36.5  C)] 97.4  F (36.3  C)  Pulse:  [] 121  Resp:  [9-25] 20  BP: ()/() 88/73  SpO2:  [92 %-99 %] 98 %  I/O last 3 completed shifts:  In: 537.75 [P.O.:480; I.V.:57.75]  Out: -     Constitutional: Pt lying in bed in trendelenburg position, awake, alert, in no overt distress with noted profoundly dry oral mucosa  Neck: No upper airway wheezes or stridor noted  Pulmonary: In no apparent respiratory distress, clear to auscultation bilaterally, no crackles or wheezes noted  Cardiovascular: Intermittently mildly tachycardic, irregular rate and rhythm, normal S1S2, no murmur, rub or gallop noted  GI: Round, soft, nondistended, nontender to palpation, no guarding or rebound tenderness noted  Skin/Integumen: Warm, dry, pink, no obvious mottling noted  Neuro: A/Ox4, clear speech, no obvious focal neuro deficit noted, moving all extremities  Psych:  Calm  Extremities: No peripheral edema    Data     Lactic acid:  Recent Labs   Lab 11/25/23  2236   LACT 1.8     CBC with Diff:  Recent Labs   Lab Test 11/25/23  2236 04/03/18  1559 07/19/17  0655   WBC 8.2   < > 8.3   HGB 10.4*   < > 12.7   MCV 85   < > 83      < > 273   INR  --   --  0.93    < > = values in this interval not displayed.       Latest Reference Range & Units 11/25/23 12:15 11/25/23 22:36   Troponin T, High Sensitivity <=14 ng/L 32 (H) 36 (H)   (H): Data is abnormally high    UA:  Recent Labs   Lab 11/25/23  1253   COLOR Orange*   APPEARANCE Cloudy*   URINEGLC Negative   URINEBILI Negative   URINEKETONE Negative   SG 1.024   UBLD Small*   URINEPH 5.0   PROTEIN 100*   NITRITE Negative   LEUKEST Large*   RBCU 84*   WBCU >182*     IMAGING: (X-ray/CT/MRI)   Recent Results (from the past 24 hour(s))   CT Chest w/o Contrast    Narrative    EXAM: CT CHEST W/O  CONTRAST  LOCATION: [facility]  DATE: 11/25/2023    INDICATION: upper back pain, PE, DISSECTION  COMPARISON: 10/31/2018  TECHNIQUE: CT chest without IV contrast. Multiplanar reformats were obtained. Dose reduction techniques were used.  CONTRAST: None.    FINDINGS:   LUNGS AND PLEURA: Bibasilar atelectasis. No focal consolidation or effusion.    MEDIASTINUM/AXILLAE: Heart is normal in size. No mediastinal, axillary, or hilar adenopathy. Nonenlarged mediastinal lymph nodes are noted. Grossly no dissection identified.      CORONARY ARTERY CALCIFICATION: Severe.    UPPER ABDOMEN: Partially visualized stone noted within the left collecting system measuring approximately 1.1 cm.Large cyst noted within the inferior pole of the right kidney.  Benign and needs no further follow-up.    MUSCULOSKELETAL: Degenerative changes of the spine. Old right rib fractures.      Impression    IMPRESSION:   1.  Limited examination due to lack of IV contrast. Cannot assess for PE or dissection. Grossly no dissection is identified.  2.  Large stone noted within the right collecting system which is partially visualized.  3.  Bibasilar atelectasis with no focal consolidation to suggest pneumonia.     CT Abdomen Pelvis w/o Contrast    Narrative    EXAM: CT ABDOMEN PELVIS W/O CONTRAST  LOCATION: [facility]  DATE: 11/26/2023    INDICATION: Large stone noted in the right renal collecting system, concerning for hydronephrosis  COMPARISON: None.  TECHNIQUE: CT scan of the abdomen and pelvis was performed without IV contrast. Multiplanar reformats were obtained. Dose reduction techniques were used.  CONTRAST: None.    FINDINGS:   LOWER CHEST: Mild scattered atelectasis. No consolidation or pleural effusion.    HEPATOBILIARY: Normal. Gallbladder is surgically absent.    PANCREAS: Normal.    SPLEEN: Normal.    ADRENAL GLANDS: Normal.    KIDNEYS/BLADDER: Multiple bilateral renal cysts measuring up to  4.8 cm in the right kidney appears benign and do not require follow-up. A 1 x 0.7 cm stone is seen in the right renal pelvis with marked adjacent fat stranding. No right hydronephrosis. A 1   mm nonobstructing stones also seen in the right kidney. No left hydroureteronephrosis. Bladder is decompressed and grossly unremarkable.    BOWEL: Colonic diverticulosis without acute diverticulitis. No focal bowel thickening or obstruction.    LYMPH NODES: Normal.    VASCULATURE: Mild aortoiliac atherosclerotic calcifications. No abdominal aortic aneurysm.    PELVIC ORGANS: Status post hysterectomy. No abnormal adnexal masses.    MUSCULOSKELETAL: Multilevel mild and moderate degenerative disc space narrowing with vacuum disc in the lower thoracic and lumbar spine. No suspicious osseous lesions or acute fractures. A large ventral abdominal hernia is present containing   nonobstructed colon and small bowel.        Impression    IMPRESSION:     1.  1 cm stone in the right renal pelvis with marked adjacent fat stranding indicating superimposed infection. Recommend correlation with urinalysis. No hydronephrosis.    2.  Additional 1 mm nonobstructing stone in the right kidney.    3.  Large ventral abdominal hernia containing nonobstructed colon and small bowel.    4.  Colonic diverticulosis without acute diverticulitis.       Medical Decision Making       30 MINUTES SPENT BY ME on the date of service doing chart review, history, exam, documentation & further activities per the note.       YOHANNES Alvarez [facility]  Hospitalist-House AMY  Hospitalist Service  Securely message with Fotofeedback (more info)  Text page via [facility] Paging/Directory

## 2023-11-27 ENCOUNTER — APPOINTMENT (OUTPATIENT)
Dept: PHYSICAL THERAPY | Facility: CLINIC | Age: 88
DRG: 872 | End: 2023-11-27
Attending: INTERNAL MEDICINE
Payer: COMMERCIAL

## 2023-11-27 LAB
ANION GAP SERPL CALCULATED.3IONS-SCNC: 14 MMOL/L (ref 7–15)
ANION GAP SERPL CALCULATED.3IONS-SCNC: 9 MMOL/L (ref 7–15)
BACTERIA UR CULT: ABNORMAL
BACTERIA UR CULT: ABNORMAL
BUN SERPL-MCNC: 30.2 MG/DL (ref 8–23)
BUN SERPL-MCNC: 31.9 MG/DL (ref 8–23)
CALCIUM SERPL-MCNC: 8.8 MG/DL (ref 8.2–9.6)
CALCIUM SERPL-MCNC: 8.8 MG/DL (ref 8.2–9.6)
CHLORIDE SERPL-SCNC: 105 MMOL/L (ref 98–107)
CHLORIDE SERPL-SCNC: 106 MMOL/L (ref 98–107)
CREAT SERPL-MCNC: 1.62 MG/DL (ref 0.51–0.95)
CREAT SERPL-MCNC: 1.62 MG/DL (ref 0.51–0.95)
DEPRECATED HCO3 PLAS-SCNC: 18 MMOL/L (ref 22–29)
DEPRECATED HCO3 PLAS-SCNC: 21 MMOL/L (ref 22–29)
EGFRCR SERPLBLD CKD-EPI 2021: 30 ML/MIN/1.73M2
EGFRCR SERPLBLD CKD-EPI 2021: 30 ML/MIN/1.73M2
ERYTHROCYTE [DISTWIDTH] IN BLOOD BY AUTOMATED COUNT: 17.2 % (ref 10–15)
GLUCOSE BLDC GLUCOMTR-MCNC: 112 MG/DL (ref 70–99)
GLUCOSE BLDC GLUCOMTR-MCNC: 129 MG/DL (ref 70–99)
GLUCOSE BLDC GLUCOMTR-MCNC: 131 MG/DL (ref 70–99)
GLUCOSE BLDC GLUCOMTR-MCNC: 159 MG/DL (ref 70–99)
GLUCOSE BLDC GLUCOMTR-MCNC: 160 MG/DL (ref 70–99)
GLUCOSE SERPL-MCNC: 121 MG/DL (ref 70–99)
GLUCOSE SERPL-MCNC: 130 MG/DL (ref 70–99)
HCT VFR BLD AUTO: 40.7 % (ref 35–47)
HGB BLD-MCNC: 12.1 G/DL (ref 11.7–15.7)
HOLD SPECIMEN: NORMAL
MCH RBC QN AUTO: 25.8 PG (ref 26.5–33)
MCHC RBC AUTO-ENTMCNC: 29.7 G/DL (ref 31.5–36.5)
MCV RBC AUTO: 87 FL (ref 78–100)
PLATELET # BLD AUTO: 295 10E3/UL (ref 150–450)
POTASSIUM SERPL-SCNC: 5.1 MMOL/L (ref 3.4–5.3)
POTASSIUM SERPL-SCNC: 5.4 MMOL/L (ref 3.4–5.3)
RBC # BLD AUTO: 4.69 10E6/UL (ref 3.8–5.2)
SODIUM SERPL-SCNC: 136 MMOL/L (ref 135–145)
SODIUM SERPL-SCNC: 137 MMOL/L (ref 135–145)
WBC # BLD AUTO: 10.1 10E3/UL (ref 4–11)

## 2023-11-27 PROCEDURE — 250N000011 HC RX IP 250 OP 636: Mod: JZ | Performed by: NURSE PRACTITIONER

## 2023-11-27 PROCEDURE — 97161 PT EVAL LOW COMPLEX 20 MIN: CPT | Mod: GP

## 2023-11-27 PROCEDURE — 80048 BASIC METABOLIC PNL TOTAL CA: CPT | Performed by: INTERNAL MEDICINE

## 2023-11-27 PROCEDURE — 97530 THERAPEUTIC ACTIVITIES: CPT | Mod: GP

## 2023-11-27 PROCEDURE — 210N000002 HC R&B HEART CARE

## 2023-11-27 PROCEDURE — 250N000011 HC RX IP 250 OP 636: Performed by: INTERNAL MEDICINE

## 2023-11-27 PROCEDURE — 36415 COLL VENOUS BLD VENIPUNCTURE: CPT | Performed by: INTERNAL MEDICINE

## 2023-11-27 PROCEDURE — 99232 SBSQ HOSP IP/OBS MODERATE 35: CPT | Performed by: UROLOGY

## 2023-11-27 PROCEDURE — 250N000013 HC RX MED GY IP 250 OP 250 PS 637: Performed by: INTERNAL MEDICINE

## 2023-11-27 PROCEDURE — 85014 HEMATOCRIT: CPT | Performed by: INTERNAL MEDICINE

## 2023-11-27 PROCEDURE — 99233 SBSQ HOSP IP/OBS HIGH 50: CPT | Performed by: INTERNAL MEDICINE

## 2023-11-27 RX ORDER — MUSCLE RUB CREAM 100; 150 MG/G; MG/G
CREAM TOPICAL EVERY 6 HOURS PRN
Status: DISCONTINUED | OUTPATIENT
Start: 2023-11-27 | End: 2023-11-27

## 2023-11-27 RX ORDER — ATORVASTATIN CALCIUM 20 MG/1
20 TABLET, FILM COATED ORAL DAILY
Status: DISCONTINUED | OUTPATIENT
Start: 2023-11-28 | End: 2023-11-29 | Stop reason: HOSPADM

## 2023-11-27 RX ADMIN — ACETAMINOPHEN 1000 MG: 500 TABLET, FILM COATED ORAL at 17:49

## 2023-11-27 RX ADMIN — METOPROLOL TARTRATE 25 MG: 25 TABLET, FILM COATED ORAL at 09:25

## 2023-11-27 RX ADMIN — MICONAZOLE NITRATE: 2 POWDER TOPICAL at 21:24

## 2023-11-27 RX ADMIN — DULOXETINE HYDROCHLORIDE 60 MG: 60 CAPSULE, DELAYED RELEASE ORAL at 09:25

## 2023-11-27 RX ADMIN — ACETAMINOPHEN 1000 MG: 500 TABLET, FILM COATED ORAL at 09:25

## 2023-11-27 RX ADMIN — ONDANSETRON 4 MG: 4 TABLET, ORALLY DISINTEGRATING ORAL at 00:12

## 2023-11-27 RX ADMIN — METOPROLOL TARTRATE 25 MG: 25 TABLET, FILM COATED ORAL at 21:25

## 2023-11-27 RX ADMIN — MENTHOL 1 PATCH: 205.5 PATCH TOPICAL at 09:34

## 2023-11-27 RX ADMIN — MENTHOL 1 PATCH: 205.5 PATCH TOPICAL at 21:28

## 2023-11-27 RX ADMIN — TIZANIDINE 4 MG: 4 TABLET ORAL at 21:39

## 2023-11-27 RX ADMIN — CEFTRIAXONE SODIUM 2 G: 2 INJECTION, POWDER, FOR SOLUTION INTRAMUSCULAR; INTRAVENOUS at 00:11

## 2023-11-27 RX ADMIN — BUPROPION HYDROCHLORIDE 100 MG: 100 TABLET, FILM COATED, EXTENDED RELEASE ORAL at 09:26

## 2023-11-27 RX ADMIN — PANTOPRAZOLE SODIUM 20 MG: 20 TABLET, DELAYED RELEASE ORAL at 09:26

## 2023-11-27 RX ADMIN — ACETAMINOPHEN 1000 MG: 500 TABLET, FILM COATED ORAL at 21:25

## 2023-11-27 RX ADMIN — OXYCODONE HYDROCHLORIDE 5 MG: 5 TABLET ORAL at 06:50

## 2023-11-27 ASSESSMENT — ACTIVITIES OF DAILY LIVING (ADL)
ADLS_ACUITY_SCORE: 38
ADLS_ACUITY_SCORE: 42
ADLS_ACUITY_SCORE: 38
ADLS_ACUITY_SCORE: 34
ADLS_ACUITY_SCORE: 38
ADLS_ACUITY_SCORE: 42
ADLS_ACUITY_SCORE: 38
ADLS_ACUITY_SCORE: 38

## 2023-11-27 NOTE — PLAN OF CARE
Goal Outcome Evaluation:      Plan of Care Reviewed With: patient    Overall Patient Progress: no changeOverall Patient Progress: no change    [facility] Nursing Note    Patient Information  Name: Moira Andre  Age: 89 year old    Admission Information  Date: 11/25/2023   Reason:Kidney stone [N20.0]  Hyponatremia [E87.1]  Upper back pain [M54.9]  Atrial fibrillation with rapid ventricular response (H) [I48.91]     Assessment  Orientation/Neuro: Alert and Oriented x4  Cardiac/Tele: Afib CVR  Resp: HENDERSON   GI/: No nausea, vomiting, abdominal pain, diarrhea, constipation or change in bowel habits   Mobility: walks with assist   Pain: right flank pain from stone treated per MAR    Diet: Orders Placed This Encounter      Combination Diet Low Saturated Fat Na <2400mg Diet      NPO for Medical/Clinical Reasons Except for: Meds    Vital Signs  B/P: 143/109, T: 98.2, P: 116, R: 22, O2: 95% on RA      Plan  NPO at midnight for cystoscopy with stent placement     Bebe Nunez RN

## 2023-11-27 NOTE — PLAN OF CARE
Pt here with Afib RVR. A&Ox4. Neuros and CMS intact. VSS on RA. Tele Afib CVR. NPO diet. Up with A2/lift. ICY HOT on and oxy given x1 for back pain. Pt scoring green on the Aggression Stop Light Tool. Plan for cystoscopy with retrograde pyelogram and ureteral stent insertion. Discharge Pending.

## 2023-11-27 NOTE — PROGRESS NOTES
Called OR to see if they had updated time for cystoscopy. OR control states after 1600 but does not have time. Pt updated.     Update: Urology stated they are canceling procedure. They don't think pain is related to stone. Patient and hospitalist update.

## 2023-11-27 NOTE — PROGRESS NOTES
[facility] Urology Progress Note          Assessment and Plan:         Kidney stone    Atrial fibrillation with rapid ventricular response (H)    Upper back pain    Assessment: Back pain, seems muscular, although is wanting to proceed with right stent placement.     Plan: Seen by ELINA yeaterday.  NPO, We discussed again need for secondary procedure to remove stone after stent placement. Stent discomfort and irritation reviewed.   Follow-up UC.   She is on the add board for the OR and would not proceed until after 4pm.   To be reviewed with Dr. Herrera.     Shira Wu PA-C  [facility]  [phone]               Interval History:     Continues with spastic, right back/flank pain,intermittent, worse when she sneezes. Stent placement yesterday cancelled due to another operative emergency transferred to this hospital. Cr improved.               Medications:     Current Facility-Administered Medications Ordered in Epic   Medication Dose Route Frequency Last Rate Last Admin    acetaminophen (TYLENOL) tablet 1,000 mg  1,000 mg Oral TID   1,000 mg at 11/27/23 0925    [Held by provider] apixaban ANTICOAGULANT (ELIQUIS) tablet 2.5 mg  2.5 mg Oral BID   2.5 mg at 11/25/23 2008    artificial saliva (BIOTENE MT) solution 2 spray  2 spray Swish & Spit 4x Daily PRN   2 spray at 11/26/23 0046    buPROPion (WELLBUTRIN SR) 12 hr tablet 100 mg  100 mg Oral Daily   100 mg at 11/27/23 0926    cefTRIAXone (ROCEPHIN) 2 g vial to attach to  ml bag for ADULTS or NS 50 ml bag for PEDS  2 g Intravenous Q24H   2 g at 11/27/23 0011    [Held by provider] clopidogrel (PLAVIX) tablet 75 mg  75 mg Oral Daily        glucose gel 15-30 g  15-30 g Oral Q15 Min PRN        Or    dextrose 50 % injection 25-50 mL  25-50 mL Intravenous Q15 Min PRN        Or    glucagon injection 1 mg  1 mg Subcutaneous Q15 Min PRN        DULoxetine (CYMBALTA) DR capsule 60 mg  60 mg Oral Daily   60 mg at 11/27/23 0925    [Held by provider]  furosemide (LASIX) tablet 20 mg  20 mg Oral Daily        [Held by provider] glimepiride (AMARYL) tablet 2 mg  2 mg Oral BID AC   2 mg at 11/25/23 1830    [Held by provider] hydrALAZINE (APRESOLINE) tablet 25 mg  25 mg Oral BID        HYDROmorphone (DILAUDID) injection 0.2 mg  0.2 mg Intravenous Q2H PRN   0.2 mg at 11/26/23 1808    insulin aspart (NovoLOG) injection (RAPID ACTING)  1-6 Units Subcutaneous Q4H   2 Units at 11/26/23 2113    lidocaine (LMX4) cream   Topical Q1H PRN        lidocaine 1 % 0.1-1 mL  0.1-1 mL Other Q1H PRN        menthol (ICY HOT) 5 % patch 1 patch  1 patch Topical BID   1 patch at 11/27/23 0934    menthol (ICY HOY) Patch in Place   Transdermal Q8H MAGDALENA        [Held by provider] metoprolol succinate ER (TOPROL XL) 24 hr tablet 100 mg  100 mg Oral BID        metoprolol tartrate (LOPRESSOR) tablet 25 mg  25 mg Oral BID   25 mg at 11/27/23 0925    miconazole (MICATIN) 2 % powder   Topical BID   Given at 11/26/23 2126    muscle rub (ARTHRITIS HOT) pain relief cream   Topical Q6H PRN        naloxone (NARCAN) injection 0.2 mg  0.2 mg Intravenous Q2 Min PRN        Or    naloxone (NARCAN) injection 0.4 mg  0.4 mg Intravenous Q2 Min PRN        Or    naloxone (NARCAN) injection 0.2 mg  0.2 mg Intramuscular Q2 Min PRN        Or    naloxone (NARCAN) injection 0.4 mg  0.4 mg Intramuscular Q2 Min PRN        ondansetron (ZOFRAN ODT) ODT tab 4 mg  4 mg Oral Q6H PRN   4 mg at 11/27/23 0012    Or    ondansetron (ZOFRAN) injection 4 mg  4 mg Intravenous Q6H PRN        oxyCODONE (ROXICODONE) tablet 5 mg  5 mg Oral Q6H PRN   5 mg at 11/27/23 0650    pantoprazole (PROTONIX) EC tablet 20 mg  20 mg Oral Daily   20 mg at 11/27/23 0926    Patient is already receiving anticoagulation with heparin, enoxaparin (LOVENOX), warfarin (COUMADIN)  or other anticoagulant medication   Does not apply Continuous PRN        sodium chloride (PF) 0.9% PF flush 3 mL  3 mL Intracatheter Q8H   3 mL at 11/26/23 6494    sodium chloride  (PF) 0.9% PF flush 3 mL  3 mL Intracatheter q1 min prn   3 mL at 11/25/23 1828    sodium chloride 0.9 % infusion   Intravenous Continuous 75 mL/hr at 11/27/23 0931 Rate Change at 11/27/23 0931    tiZANidine (ZANAFLEX) tablet 4 mg  4 mg Oral Q6H PRN         No current [facility]-ordered outpatient medications on file.                  Physical Exam:   Vitals were reviewed  Patient Vitals for the past 8 hrs:   BP Temp Temp src Pulse Resp SpO2 Weight   11/27/23 0940 -- -- -- 115 22 95 % --   11/27/23 0930 -- -- -- 108 27 91 % --   11/27/23 0920 -- -- -- 95 26 (!) 85 % --   11/27/23 0722 -- 97.7  F (36.5  C) Oral -- -- -- --   11/27/23 0600 128/70 -- -- 101 22 93 % --   11/27/23 0555 128/70 -- -- 108 24 90 % 121.9 kg (268 lb 11.9 oz)   11/27/23 0400 118/89 -- -- 113 10 (!) 70 % --   11/27/23 0200 117/84 -- -- 96 14 (!) 88 % --     GEN: NAD, lying in bed  EYES: EOMI             Data:     Lab Results   Component Value Date    NTBNPI 3,302 (H) 11/25/2023    NTBNPI 4,629 (H) 10/10/2023    NTBNPI 1,163 04/19/2023     Lab Results   Component Value Date    WBC 10.1 11/27/2023    WBC 9.5 11/26/2023    WBC 8.2 11/25/2023    HGB 12.1 11/27/2023    HGB 11.8 11/26/2023    HGB 10.4 (L) 11/25/2023    HCT 40.7 11/27/2023    HCT 38.7 11/26/2023    HCT 33.6 (L) 11/25/2023    MCV 87 11/27/2023    MCV 84 11/26/2023    MCV 85 11/25/2023     11/27/2023     11/26/2023     11/25/2023     Lab Results   Component Value Date    INR 0.93 07/19/2017    INR 0.99 09/21/2016    INR 1.03 08/13/2013

## 2023-11-27 NOTE — PROGRESS NOTES
"SPIRITUAL HEALTH SERVICES - Progress Note  [facility]  Referral Source/Reason for Visit: Staff Referral    Summary and Recommendations -  Kristyn is a member of [employer]. She joined the [employer] team and has found great enjoyment in that career and being involved with leading [employer].    Plan: Will follow up in 1-2 days for ongoing support. Please contact Spiritual Health as needs arise.    Helena Shelley M.Div.  Staff     SHS available 24/7 for emergent requests/referrals, either by paging the on-call  or by entering an ASAP/STAT consult in [employer], which will also page the on-call .    Assessment    Saw pt Moira \"Kristyn\" E Jes per staff referral.    Patient/Family Understanding of Illness and Goals of Care - Kristyn shared that the pain in her back was getting worse so decided to come to the ER. She is waiting to hear if she'll be having a procedure later today. She anticipates going to a TCU at discharge in order to gain more strength.    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  Kristyn shared that she has a close friend, Reyna, from her [employer] who was also involved in the  [employer] who supports her and drove her to the ER.    Meaning, Beliefs, and Spirituality - Kristyn is a member of [employer].    "

## 2023-11-27 NOTE — PROGRESS NOTES
MD Notification    Notified Person: MD    Notified Person Name: Blair     Notification Date/Time: 11/27/23 1050    Notification Interaction: [employer] messaging     Purpose of Notification: Obtained standing wt, only had bed scale this admit. Standing scale 277.9 lb, pt states her dry wt is 265 lb. Can we stop fluids?    Orders Received: No response - paged again at 12:40 pm, orders to stop fluids    Comments:

## 2023-11-27 NOTE — PROGRESS NOTES
11/27/23 0800   Appointment Info   Signing Clinician's Name / Credentials (PT) Tamra Rushing, PT   Living Environment   People in Home alone   Current Living Arrangements apartment   Home Accessibility no concerns   Transportation Anticipated car, drives self   Self-Care   Usual Activity Tolerance good   Current Activity Tolerance poor   Regular Exercise No   Equipment Currently Used at Home walker, rolling   Fall history within last six months no   General Information   Onset of Illness/Injury or Date of Surgery 11/25/23   Referring Physician Dr. Oquendo   Patient/Family Therapy Goals Statement (PT) To go home   Pertinent History of Current Problem (include personal factors and/or comorbidities that impact the POC) Pt is a 89 year old female admitted with a fib and kidney stone.   Existing Precautions/Restrictions fall   Cognition   Affect/Mental Status (Cognition) WFL   Orientation Status (Cognition) oriented x 4   Follows Commands (Cognition) WFL   Pain Assessment   Patient Currently in Pain Yes, see Vital Sign flowsheet  (Back pain)   Range of Motion (ROM)   Range of Motion ROM is WFL   Strength (Manual Muscle Testing)   Strength Comments Strength gorssly 4/5 bilateral LEs   Bed Mobility   Comment, (Bed Mobility) Min A   Transfers   Comment, (Transfers) Min A   Gait/Stairs (Locomotion)   Comment, (Gait/Stairs) Unable to ambulate 2/2 back pain   Balance   Balance Comments Good in sitting, fair in standing   Clinical Impression   Criteria for Skilled Therapeutic Intervention Yes, treatment indicated   PT Diagnosis (PT) Impaired ambulation   Influenced by the following impairments Decreased strength, decreased endurance, decreased balance   Functional limitations due to impairments Difficulty with bed mobility, transfers, ambulation   Clinical Presentation (PT Evaluation Complexity) stable   Clinical Presentation Rationale Clinical judgment   Clinical Decision Making (Complexity) low complexity   Planned Therapy  Interventions (PT) balance training;bed mobility training;gait training;patient/family education;strengthening;transfer training   Risk & Benefits of therapy have been explained evaluation/treatment results reviewed;care plan/treatment goals reviewed;risks/benefits reviewed;current/potential barriers reviewed;participants voiced agreement with care plan;participants included;patient   PT Total Evaluation Time   PT Eval, Low Complexity Minutes (04342) 10   PT Discharge Planning   PT Plan Trial ambulation with w/c follow, progress independence with mobility   PT Discharge Recommendation (DC Rec) Transitional Care Facility   PT Rationale for DC Rec At baseline, pt lives alone in an apartment with no accessibility concerns. Pt reports independence with use of 4ww prior to admit. This date, Pt is well below baseline and would be a considerable fall risk if she returned to home environment. Pt currently requires assist with all mobility, is unable to ambulate 2/2 pain and has decreased activity tolerance. Pt will benefit from continued therapy at TCU to address impairments and increase mobility and functional independence prior to returning home.   PT Brief overview of current status Assist of 1, decreased activity tolerance   Total Session Time   Total Session Time (sum of timed and untimed services) 10

## 2023-11-27 NOTE — PROGRESS NOTES
[facility]    Medicine Progress Note - Hospitalist Service    Date of Admission:  11/25/2023    Assessment & Plan   Moira Andre is a 89 year old female with PMH of HFpEF, recently diagnosed atrial fibrillation, CKD, DM2, MDD, ELIGIO, who presents with muscle spasms/right flank pain and atrial fibrillation with RVR.    CT imaging reviewed large right nephrolithasis (1 x 0.7cm) with fat stranding clinical concerning for right-sided pyelonephritis.    11/26 overnight- she was persistently hypotensive and evaluated on several occasions (1750cc IVF bolues given overnight) for treatment of developing sepsis.  She continues on IV rocephin and IVF.  Was initially on diltiazem gtt for a fib with RVR, but currently off with only mild tachycardia currently.      # Sepsis d/t right-sided pyelonephritis with possible associated infected stone  - was tachycardic in ER (Afib with RVR), but BP was stable initially  - 11/26 overnight was hypotensive, RRT called, received few boluses of NS  - UA positive  - UC- positive for >100,000 col E coli and ?100,000 GNB  - CT abd/pelvis- 1 cm stone in the right renal pelvis with marked adjacent fat stranding indicating superimposed infection. Recommend correlation with urinalysis. No hydronephrosis. Additional 1 mm nonobstructing stone in the right kidney.  - started on Ceftriaxone 2gm iv daily  - Urology consulted  - her right flank pain seems MSK but also given above findings, her right side flank pain may represent renal colic  - plan for cystoscopy with ureteral stent placement today  - npo for now  - Holding PTA plavix for now   - holding PTA Eliquis  - follow up UC sensitivities and adjust antibiotics as indicated  - Follow-up fever curve and white blood cells trend    #  A fib with RVR  *New clinical diagnosis in the last month  *Recent admission from 10/10 to 10/13/2023 for A-fib with RVR (new diagnosis) note, acute hypoxic respiratory failure due to CHF  exacerbation with preserved ejection fraction and pulmonary edema  - Cardiology was consulted at that time  - She was diuresed with IV Lasix  - She was started on oral Eliquis  - For rate control her Toprol-XL dose was increased to 100 mg p.o. twice daily  - PTA Toprol resumed on admission but then held because of hypotension  - Had been on Cardizem drip now discontinued  - Started on low-dose Lopressor 25 mg p.o. twice daily with holding parameters  - Heart rate improved in 100s  - Telemetry  - PTA Eliquis on hold given plan for cystoscopy    # HFpEF  # Moderate mitral stenosis  # Moderate aortic stenosis  *Echo 10/12/23  1. The left ventricle is normal in structure, function and size. The visual  ejection fraction is estimated at 55%.  2. The right ventricle is normal in structure, function and size.  3. There is moderate mitral stenosis. The mean mitral valve gradient is 6mmHg.  4. Moderate valvular aortic stenosis    -Recent hospitalization for acute hypoxic respiratory failure due to pulmonary edema due to congestive heart failure exacerbation   -Diuresed with Lasix at that time and discharged home on Lasix 20 mg p.o. daily  -Lasix held on admission as she was hypotensive, requiring multiple IV boluses  -Currently blood pressure improved  -She seems slightly fluid overload; currently on oxygen 2 L via nasal cannula  -Will stop IV fluids at this time  -Monitor fluid status  -Patient may consider diuresis    # Back muscle spasms  - Unsure if related to right-sided kidney stone or more musculoskeletal; she reports that flank pain/back pain worsened the last week after she carried many groceries  - Received one dose of IV toradol earlier in admission  - Initially started on scheduled Zanaflex which eventually was due to hypotension  - Blood pressure normalized for now  - Ordered Zanaflex 4 mg p.o. every 6 hours as needed  - PTA on scheduled Tylenol 1000 mg p.o. twice daily, now on Tylenol 1000 mg p.o. 3 times  daily  - Lidoderm patch, heating pad  - PTA on oxycodone 5 mg p.o. daily as needed, currently on oxycodone 5 mg p.o. every 6 hours as needed  - PT evaluation    # Hyponatremia, lab error.  - Sodium level has normalized     # HTN  # HLD  # CAD  *Prior history of BMS x 2 to OM and diagonal in 2007. Most recent angiogram in 2017 did not reveal any significant obstructive disease.   [PTA on Plavix 75 mg p.o. daily, Lipitor 20 mg p.o. daily, hydralazine 25 mg p.o. twice daily, Toprol- mg p.o. twice daily]  - PTA hydralazine and Toprol held because of hypotension  - PTA Plavix held given plan for cystoscopy  - Currently on metoprolol 25 mg p.o. twice daily  - Resume prior to admission statin     # TIFFANI on CKD3  - At baseline Cr 1.7  - Creatinine up to 2.06 on 11/26; likely prerenal in the setting of hypotension  - Received boluses of normal saline and started on maintenance IV fluid   Creatinine improved to 1.62 today  - There is some concern for fluid overload so we will stop IV fluid  - Avoid nephrotoxic drugs  - BMP in a.m.    # Hyperkalemia  - Potassium 5.4 today  - Received IV fluids as above  - Repeat BMP at noon and in a.m.     # DM2  - PTA on glimepiride 2 mg p.o. twice daily-held on admission  -Blood sugar checks  -Insulin sliding scale  -Hypoglycemia protocol    # MDD  - PTA bupropion, duloxetine - continue     # ELIGIO: Continue CPAP      Medical Decision Making       60 MINUTES SPENT BY ME on the date of service doing chart review, history, exam, documentation & further activities per the note.           Diet: NPO for Medical/Clinical Reasons Except for: Meds    DVT Prophylaxis: PCDs until urology ok's to resume  Charles Catheter: Not present  Lines: None     Cardiac Monitoring: ACTIVE order. Indication: Tachyarrhythmias, acute (48 hours)  Code Status: Full Code      Clinically Significant Risk Factors        # Hypokalemia: Lowest K = 3.1 mmol/L in last 2 days, will replace as needed  # Hyperkalemia: Highest K  "= 5.4 mmol/L in last 2 days, will monitor as appropriate  # Hyponatremia: Lowest Na = 113 mmol/L in last 2 days, will monitor as appropriate            # Hypertension: Noted on problem list       # DMII: A1C = 7.9 % (Ref range: 0.0 - 5.6 %) within past 6 months, PRESENT ON ADMISSION  # Severe Obesity: Estimated body mass index is 42.09 kg/m  as calculated from the following:    Height as of 11/16/23: 1.702 m (5' 7\").    Weight as of this encounter: 121.9 kg (268 lb 11.9 oz)., PRESENT ON ADMISSION     # Asthma: noted on problem list        Disposition Plan      Expected Discharge Date: 11/29/2023                  Marielos Pinto MD  Hospitalist Service  [facility]  Securely message with [employer] (more info)  Text page via [facility] Paging/Directory   ______________________________________________________________________    Interval History   Up in the chair; reports right sided lower chest/rt flank pain that is worse with change of position and cough  Had some nausea yesterday, now improved  No chest pain  Reports SOB with exertion  No abd pain  Discussed about possibility to have to go to TCU but she stated today she would prefer to go home  Discussed with RN    Physical Exam   Vital Signs: Temp: 97.7  F (36.5  C) Temp src: Oral BP: 128/70 Pulse: 101   Resp: 22 SpO2: 93 % O2 Device: None (Room air)    Weight: 268 lbs 11.85 oz    GEN:  Alert, awake, up in the chair, mildly dyspneic when talking  HEENT:  Normocephalic/atraumatic, no scleral icterus, no nasal discharge,   CV:  irregular rate and rhythm, mild tachycardia, no murmurs  LUNGS:  Clear to auscultation ant/lat bilaterally without rales/rhonchi/wheezing/retractions.  Symmetric chest rise on inhalation noted.  ABD: Large ventral hernia.  Abdomen is soft, BS present  EXT:  trace pretibial edema bilaterally.  No cyanosis.  No acute joint synovitis noted.  SKIN:  Dry to touch, no exanthems noted in the visualized areas.  Psych - " cooperative, pleasant    Medications    - MEDICATION INSTRUCTIONS -      sodium chloride 100 mL/hr at 11/27/23 0100      acetaminophen  1,000 mg Oral TID    [Held by provider] apixaban ANTICOAGULANT  2.5 mg Oral BID    buPROPion  100 mg Oral Daily    cefTRIAXone  2 g Intravenous Q24H    [Held by provider] clopidogrel  75 mg Oral Daily    DULoxetine  60 mg Oral Daily    [Held by provider] furosemide  20 mg Oral Daily    [Held by provider] glimepiride  2 mg Oral BID AC    [Held by provider] hydrALAZINE  25 mg Oral BID    insulin aspart  1-6 Units Subcutaneous Q4H    menthol  1 patch Topical BID    menthol   Transdermal Q8H MAGDALENA    [Held by provider] metoprolol succinate ER  100 mg Oral BID    metoprolol tartrate  25 mg Oral BID    miconazole   Topical BID    pantoprazole  20 mg Oral Daily    sodium chloride (PF)  3 mL Intracatheter Q8H    [Held by provider] tiZANidine  4 mg Oral TID       Data     Labs and Imaging results below reviewed today.  Recent Labs   Lab 11/27/23 0644 11/26/23 0744 11/25/23  2236   WBC 10.1 9.5 8.2   HGB 12.1 11.8 10.4*   HCT 40.7 38.7 33.6*   MCV 87 84 85    249 255     Recent Labs   Lab 11/27/23  0720 11/27/23  0644 11/27/23  0115 11/26/23  0818 11/26/23  0744 11/25/23  1712 11/25/23  1442   NA  --  137  --   --  140  --  137   POTASSIUM  --  5.4*  --   --  4.6  --  4.3   CHLORIDE  --  105  --   --  106  --  102   CO2  --  18*  --   --  18*  --  21*   ANIONGAP  --  14  --   --  16*  --  14   * 130* 129*   < > 116*   < > 129*   BUN  --  31.9*  --   --  36.9*  --  34.6*   CR  --  1.62*  --   --  2.06*  --  1.72*   GFRESTIMATED  --  30*  --   --  22*  --  28*   TELLY  --  8.8  --   --  8.8  --  9.4    < > = values in this interval not displayed.     Recent Labs   Lab 11/27/23 0720 11/27/23  0644 11/27/23  0115 11/26/23  0818 11/26/23  0744 11/25/23  1712 11/25/23  1442 11/25/23  1215   NA  --  137  --   --  140  --  137 113*   POTASSIUM  --  5.4*  --   --  4.6  --  4.3 3.1*    CHLORIDE  --  105  --   --  106  --  102 125*   CO2  --  18*  --   --  18*  --  21* 20*   ANIONGAP  --  14  --   --  16*  --  14 <1*   * 130* 129*   < > 116*   < > 129* 174*   BUN  --  31.9*  --   --  36.9*  --  34.6* 35.0*   CR  --  1.62*  --   --  2.06*  --  1.72* 1.75*  2.0*   GFRESTIMATED  --  30*  --   --  22*  --  28* 23*  27*   TELLY  --  8.8  --   --  8.8  --  9.4 9.8*   PROTTOTAL  --   --   --   --   --   --   --  8.2   ALBUMIN  --   --   --   --   --   --   --  4.2   BILITOTAL  --   --   --   --   --   --   --  0.5   ALKPHOS  --   --   --   --   --   --   --  83   AST  --   --   --   --   --   --   --  15   ALT  --   --   --   --   --   --   --  9    < > = values in this interval not displayed.       No results found for this or any previous visit (from the past 24 hour(s)).

## 2023-11-27 NOTE — PROVIDER NOTIFICATION
Provider notified: Benji Camp  Purpose for notification: [id number]   Can you please modify pt's BG times? It currently shows Q2 but orders are for Q4hrs. Thanks, Keri RN *[phone]  Orders received:

## 2023-11-27 NOTE — PROVIDER NOTIFICATION
MD Notification    Notified Person: MD    Notified Person Name: Benji    Notification Date/Time: 11/26/23 2000    Notification Interaction: paged    Purpose of Notification: pt asking for another GI cocktail, had 1 at 1200 which was effective.     Orders Received: GI cocktail    Comments:

## 2023-11-27 NOTE — CONSULTS
Care Management Initial Consult    General Information  Assessment completed with: Patient,    Type of CM/SW Visit: Initial Assessment    Primary Care Provider verified and updated as needed:     Readmission within the last 30 days:        Reason for Consult: discharge planning  Advance Care Planning:            Communication Assessment  Patient's communication style: spoken language (English or Bilingual)    Hearing Difficulty or Deaf: yes   Wear Glasses or Blind: yes    Cognitive  Cognitive/Neuro/Behavioral: WDL                      Living Environment:   People in home: alone     Current living Arrangements: apartment      Able to return to prior arrangements: other (see comments) (TCU first)       Family/Social Support:  Care provided by:    Provides care for: no one, unable/limited ability to care for self  Marital Status:   Children, Neighbor          Description of Support System: Involved, Supportive    Support Assessment: Adequate social supports, Adequate family and caregiver support    Current Resources:   Patient receiving home care services: Yes     Community Resources: None  Equipment currently used at home: walker, rolling  Supplies currently used at home:      Employment/Financial:  Employment Status:          Financial Concerns:             Does the patient's insurance plan have a 3 day qualifying hospital stay waiver?  Yes     Which insurance plan 3 day waiver is available? Alternative insurance waiver    Will the waiver be used for post-acute placement? Undetermined at this time    Lifestyle & Psychosocial Needs:  Social Determinants of Health     Food Insecurity: Low Risk  (10/26/2023)    Food Insecurity     Within the past 12 months, did you worry that your food would run out before you got money to buy more?: No     Within the past 12 months, did the food you bought just not last and you didn t have money to get more?: No   Depression: Not at risk (11/16/2023)    PHQ-2     PHQ-2 Score: 2    Housing Stability: Low Risk  (10/26/2023)    Housing Stability     Do you have housing? : Yes     Are you worried about losing your housing?: No   Tobacco Use: Medium Risk (11/27/2023)    Patient History     Smoking Tobacco Use: Former     Smokeless Tobacco Use: Never     Passive Exposure: Not on file   Financial Resource Strain: Low Risk  (10/26/2023)    Financial Resource Strain     Within the past 12 months, have you or your family members you live with been unable to get utilities (heat, electricity) when it was really needed?: No   Alcohol Use: Not on file   Transportation Needs: Low Risk  (10/26/2023)    Transportation Needs     Within the past 12 months, has lack of transportation kept you from medical appointments, getting your medicines, non-medical meetings or appointments, work, or from getting things that you need?: No   Physical Activity: Not on file   Interpersonal Safety: Low Risk  (10/26/2023)    Interpersonal Safety     Do you feel physically and emotionally safe where you currently live?: Yes     Within the past 12 months, have you been hit, slapped, kicked or otherwise physically hurt by someone?: No     Within the past 12 months, have you been humiliated or emotionally abused in other ways by your partner or ex-partner?: No   Stress: No Stress Concern Present (6/10/2021)    [place] [employer] - Occupational Stress Questionnaire     Feeling of Stress : Not at all   Social Connections: Unknown (6/10/2021)    Social Connection and Isolation Panel [NHANES]     Frequency of Communication with Friends and Family: Not on file     Frequency of Social Gatherings with Friends and Family: Not on file     Attends Zoroastrian Services: Not on file     Active Member of Clubs or Organizations: Not on file     Attends Club or Organization Meetings: Not on file     Marital Status:        Functional Status:  Prior to admission patient needed assistance:              Mental Health  Status:          Chemical Dependency Status:                Values/Beliefs:  Spiritual, Cultural Beliefs, Zoroastrian Practices, Values that affect care:                 Additional Information:  CM/SW consulted for discharge planning/disposition. SW completed chart review. Per history and physical, patient is a 89 year old female with PMH of HFpEF, recently diagnosed atrial fibrillation, CKD, DM2, MDD, ELIGIO, who presents with muscle spasms and atrial fibrillation with RVR. Admitted for pain and heart rate control.    SW met with patient at bedside to introduce self and role and discuss discharge planning. Patient lives in an apartment alone and uses a 4ww. Therapy is currently recommending patient go to TCU prior to returning to apartment. Patient is in agreement with this. She would like to go to [facility] but is ok with referrals being sent to [facility]. Patient does not want to go to any [facility] facilities. SW to send referrals. Patient also stated she would like family or a friend to transport when she is ready to discharge.    SW will continue to follow.     DENISE Weiner

## 2023-11-28 ENCOUNTER — APPOINTMENT (OUTPATIENT)
Dept: PHYSICAL THERAPY | Facility: CLINIC | Age: 88
DRG: 872 | End: 2023-11-28
Payer: COMMERCIAL

## 2023-11-28 DIAGNOSIS — N18.32 STAGE 3B CHRONIC KIDNEY DISEASE (H): Primary | ICD-10-CM

## 2023-11-28 LAB
ANION GAP SERPL CALCULATED.3IONS-SCNC: 12 MMOL/L (ref 7–15)
ATRIAL RATE - MUSE: 90 BPM
BUN SERPL-MCNC: 29 MG/DL (ref 8–23)
CALCIUM SERPL-MCNC: 8.6 MG/DL (ref 8.2–9.6)
CHLORIDE SERPL-SCNC: 105 MMOL/L (ref 98–107)
CREAT SERPL-MCNC: 1.59 MG/DL (ref 0.51–0.95)
DEPRECATED HCO3 PLAS-SCNC: 19 MMOL/L (ref 22–29)
DIASTOLIC BLOOD PRESSURE - MUSE: NORMAL MMHG
EGFRCR SERPLBLD CKD-EPI 2021: 31 ML/MIN/1.73M2
GLUCOSE BLDC GLUCOMTR-MCNC: 101 MG/DL (ref 70–99)
GLUCOSE BLDC GLUCOMTR-MCNC: 107 MG/DL (ref 70–99)
GLUCOSE BLDC GLUCOMTR-MCNC: 108 MG/DL (ref 70–99)
GLUCOSE BLDC GLUCOMTR-MCNC: 112 MG/DL (ref 70–99)
GLUCOSE BLDC GLUCOMTR-MCNC: 125 MG/DL (ref 70–99)
GLUCOSE BLDC GLUCOMTR-MCNC: 82 MG/DL (ref 70–99)
GLUCOSE SERPL-MCNC: 148 MG/DL (ref 70–99)
INTERPRETATION ECG - MUSE: NORMAL
P AXIS - MUSE: NORMAL DEGREES
POTASSIUM SERPL-SCNC: 5.1 MMOL/L (ref 3.4–5.3)
PR INTERVAL - MUSE: NORMAL MS
QRS DURATION - MUSE: 96 MS
QT - MUSE: 464 MS
QTC - MUSE: 497 MS
R AXIS - MUSE: -19 DEGREES
SODIUM SERPL-SCNC: 136 MMOL/L (ref 135–145)
SYSTOLIC BLOOD PRESSURE - MUSE: NORMAL MMHG
T AXIS - MUSE: 74 DEGREES
VENTRICULAR RATE- MUSE: 69 BPM

## 2023-11-28 PROCEDURE — 97530 THERAPEUTIC ACTIVITIES: CPT | Mod: GP

## 2023-11-28 PROCEDURE — 99233 SBSQ HOSP IP/OBS HIGH 50: CPT | Performed by: INTERNAL MEDICINE

## 2023-11-28 PROCEDURE — 250N000013 HC RX MED GY IP 250 OP 250 PS 637: Performed by: INTERNAL MEDICINE

## 2023-11-28 PROCEDURE — 250N000011 HC RX IP 250 OP 636: Mod: JZ | Performed by: NURSE PRACTITIONER

## 2023-11-28 PROCEDURE — 36415 COLL VENOUS BLD VENIPUNCTURE: CPT | Performed by: INTERNAL MEDICINE

## 2023-11-28 PROCEDURE — 80048 BASIC METABOLIC PNL TOTAL CA: CPT | Performed by: INTERNAL MEDICINE

## 2023-11-28 PROCEDURE — 210N000002 HC R&B HEART CARE

## 2023-11-28 RX ADMIN — APIXABAN 2.5 MG: 2.5 TABLET, FILM COATED ORAL at 08:10

## 2023-11-28 RX ADMIN — ACETAMINOPHEN 1000 MG: 500 TABLET, FILM COATED ORAL at 15:35

## 2023-11-28 RX ADMIN — Medication 2 SPRAY: at 21:51

## 2023-11-28 RX ADMIN — TIZANIDINE 4 MG: 4 TABLET ORAL at 21:08

## 2023-11-28 RX ADMIN — CEFTRIAXONE SODIUM 2 G: 2 INJECTION, POWDER, FOR SOLUTION INTRAMUSCULAR; INTRAVENOUS at 23:53

## 2023-11-28 RX ADMIN — ATORVASTATIN CALCIUM 20 MG: 20 TABLET, FILM COATED ORAL at 08:10

## 2023-11-28 RX ADMIN — ACETAMINOPHEN 1000 MG: 500 TABLET, FILM COATED ORAL at 08:10

## 2023-11-28 RX ADMIN — MICONAZOLE NITRATE: 2 POWDER TOPICAL at 08:18

## 2023-11-28 RX ADMIN — PANTOPRAZOLE SODIUM 20 MG: 20 TABLET, DELAYED RELEASE ORAL at 08:10

## 2023-11-28 RX ADMIN — METOPROLOL TARTRATE 25 MG: 25 TABLET, FILM COATED ORAL at 21:09

## 2023-11-28 RX ADMIN — MENTHOL 1 PATCH: 205.5 PATCH TOPICAL at 21:13

## 2023-11-28 RX ADMIN — ACETAMINOPHEN 1000 MG: 500 TABLET, FILM COATED ORAL at 21:06

## 2023-11-28 RX ADMIN — CEFTRIAXONE SODIUM 2 G: 2 INJECTION, POWDER, FOR SOLUTION INTRAMUSCULAR; INTRAVENOUS at 00:16

## 2023-11-28 RX ADMIN — MENTHOL 1 PATCH: 205.5 PATCH TOPICAL at 13:01

## 2023-11-28 RX ADMIN — BUPROPION HYDROCHLORIDE 100 MG: 100 TABLET, FILM COATED, EXTENDED RELEASE ORAL at 08:10

## 2023-11-28 RX ADMIN — MICONAZOLE NITRATE: 2 POWDER TOPICAL at 20:57

## 2023-11-28 RX ADMIN — DULOXETINE HYDROCHLORIDE 60 MG: 60 CAPSULE, DELAYED RELEASE ORAL at 08:11

## 2023-11-28 RX ADMIN — TIZANIDINE 4 MG: 4 TABLET ORAL at 05:28

## 2023-11-28 RX ADMIN — APIXABAN 2.5 MG: 2.5 TABLET, FILM COATED ORAL at 21:07

## 2023-11-28 RX ADMIN — CLOPIDOGREL BISULFATE 75 MG: 75 TABLET ORAL at 08:10

## 2023-11-28 RX ADMIN — METOPROLOL TARTRATE 25 MG: 25 TABLET, FILM COATED ORAL at 08:10

## 2023-11-28 RX ADMIN — FUROSEMIDE 20 MG: 20 TABLET ORAL at 08:11

## 2023-11-28 ASSESSMENT — ACTIVITIES OF DAILY LIVING (ADL)
ADLS_ACUITY_SCORE: 35
ADLS_ACUITY_SCORE: 34
ADLS_ACUITY_SCORE: 34
ADLS_ACUITY_SCORE: 35
ADLS_ACUITY_SCORE: 35
ADLS_ACUITY_SCORE: 34
ADLS_ACUITY_SCORE: 35
ADLS_ACUITY_SCORE: 34
ADLS_ACUITY_SCORE: 34
ADLS_ACUITY_SCORE: 35

## 2023-11-28 NOTE — PLAN OF CARE
Goal Outcome Evaluation:      Plan of Care Reviewed With: patient    Overall Patient Progress: improvingOverall Patient Progress: improving    [facility]    Patient Information  Name: Moira Andre  Age: 89 year old    Admission Information  Date: 11/25/2023   Reason:Kidney stone [N20.0]  Hyponatremia [E87.1]  Upper back pain [M54.9]  Atrial fibrillation with rapid ventricular response (H) [I48.91]     Assessment  Orientation/Neuro: Alert and Oriented x4  Cardiac/Tele: Afib CVR and RVR at times  Resp: HENDERSON   GI/:  No nausea, vomiting, abdominal pain, diarrhea, constipation or change in bowel habits   Mobility: walks with assist   Pain: pt reports decreased pain this shift - improving from yesterday   Diet: Orders Placed This Encounter      Combination Diet Low Saturated Fat Na <2400mg Diet    Vital Signs  B/P: 131/88, T: 97.8, P: 98, R: 15, O2: 95% on RA      Plan  Potential discharge in 1-2 days pending TCU placement    Bebe Nunez RN

## 2023-11-28 NOTE — PROGRESS NOTES
Care Management Follow Up    Length of Stay (days): 3    Expected Discharge Date: 11/29/2023     Concerns to be Addressed: discharge planning     Patient plan of care discussed at interdisciplinary rounds: Yes    Anticipated Discharge Disposition:       Anticipated Discharge Services:    Anticipated Discharge DME:      Patient/family educated on Medicare website which has current facility and service quality ratings:    Education Provided on the Discharge Plan:    Patient/Family in Agreement with the Plan:      Referrals Placed by CM/SW:    Private pay costs discussed: Not applicable    Additional Information:  According to [facility], patient accepted at [facility]. Writer called [facility] admissions and left a VM. Writer called [facility] and spoke with Jeanette. Jeanette confirmed that she accepted patient for an acceptance tomorrow (11/29)    Addendum 1100: Call received from Jeanette at [facility] that they had a change and can take patient today anytime between [phone]. Jeanette shared that it is a private room for $36 dollars a day or a semi-private for $11 a day. Writer paged MD to confirm if patient was ready. Updated from MD that patient would be more appropraite for a discharge tomorrow. Writer confirmed with Jeanette in admissions that they should be able to accept patient tomorrow and asked for a ride around [phone] tomorrow.    Writer met with patient at bedside. Patient agreed to [facility] at discharge and is agreeable to the $36 room fee and prefers the private room. Patient aware that [facility] can take her tomorrow anytime between [phone].Patient shared that she thinks she will be able to have a friend take her and will call. Writer stated she will follow up tomorrow. Writer spoke with Jeanette at [facility] and updated her that patient confirmed private room. Jeanette reported that [facility] is still waiving prior auths so she will be able to complete auth once patient  admits to the facility and confirmed it is not needed prior to arrival.       Addendum 1215: PAS completed and placed on chart.    PAS-RR    D: Per DHS regulation, SW completed and submitted PAS-RR to [place] [employer] Direct Connect via the Senior LinkAge Line.  PAS-RR confirmation # is : [id number]    I: SW spoke with patient and they are aware a PAS-RR has been submitted.  SW reviewed with patient that they may be contacted for a follow up appointment within 10 days of hospital discharge if their SNF stay is < 30 days.  Contact information for Senior LinkAge Line was also provided.    A: patient  verbalized understanding.    P: Further questions may be directed to [facility] LinkAge Line at #[phone], option #4 for PAS-RR staff.     Addendum 1430: Spoke with patient at bedside, she had not called to determine a ride time. Patient called her friend, Michelle while writer was in the room. She will come get patient tomorrow ar 1430 to bring to [facility]. Patient wanted to go back to bed, writer got RN. Writer sent message to Jeanette at [facility] via [employer] regarding transportation ride for tomorrow.     Meghna Rebollar, FABRICIO, LGSW   Social Work   [facility]

## 2023-11-28 NOTE — PROGRESS NOTES
Text paged dr. Pinto the following message:BP 76/63 after walking back from the bathroom, asymptomatic re-ceck was 93/55.

## 2023-11-28 NOTE — PROGRESS NOTES
[facility]    Medicine Progress Note - Hospitalist Service    Date of Admission:  11/25/2023    Assessment & Plan   Moira Andre is a 89 year old female with PMH of HFpEF, recently diagnosed atrial fibrillation, CKD, DM2, MDD, ELIGIO, who presents with muscle spasms/right flank pain and atrial fibrillation with RVR.    CT imaging reviewed large right nephrolithasis (1 x 0.7cm) with fat stranding clinical concerning for right-sided pyelonephritis.    11/26 overnight- she was persistently hypotensive and evaluated on several occasions (1750cc IVF bolues given overnight) for treatment of developing sepsis.  She continues on IV rocephin and IVF.  Was initially on diltiazem gtt for a fib with RVR, but currently off with only mild tachycardia currently.      # Sepsis d/t right-sided pyelonephritis   # Rt kidney stone  - was tachycardic in ER (Afib with RVR), but BP was stable initially  - 11/26 overnight was hypotensive, RRT called, received few boluses of NS  - UA positive  - UC- positive for 2 strains >100,000 col E coli, pansensitive  - CT abd/pelvis- 1 cm stone in the right renal pelvis with marked adjacent fat stranding indicating superimposed infection. Recommend correlation with urinalysis. No hydronephrosis. Additional 1 mm nonobstructing stone in the right kidney.  - started on Ceftriaxone 2gm iv daily  - given CT findings, there was concern for infected kidney stone  - Urology consulted  - her right flank pain seems MSK but also given above findings, her right side flank pain may represent renal colic  - initially plan cystoscopy with ureteral stent placement but cancelled; Urology does not feel that her flank pain is related to kidney stone; no evidence of hydronephrosis on CT  - continue Ceftriaxone  - no BC done on admission   - vitals- BP, HR improved  - Follow-up fever curve and white blood cells trend  - needs to follow up with Urology as outpatient     #  A fib with RVR,  improved  *New clinical diagnosis in the last month  *Recent admission from 10/10 to 10/13/2023 for A-fib with RVR (new diagnosis) note, acute hypoxic respiratory failure due to CHF exacerbation with preserved ejection fraction and pulmonary edema  - Cardiology was consulted at that time  - She was diuresed with IV Lasix  - She was started on oral Eliquis  - For rate control her Toprol-XL dose was increased to 100 mg p.o. twice daily  - PTA Toprol resumed on admission but then held because of hypotension  - Had been on Cardizem drip now discontinued  - Started on low-dose Lopressor 25 mg p.o. twice daily with holding parameters  - Heart rate improved in 80's today  - Telemetry  - PTA Eliquis was held given plan for cystoscopy, resume today    # HFpEF  # Moderate mitral stenosis  # Moderate aortic stenosis  *Echo 10/12/23  1. The left ventricle is normal in structure, function and size. The visual  ejection fraction is estimated at 55%.  2. The right ventricle is normal in structure, function and size.  3. There is moderate mitral stenosis. The mean mitral valve gradient is 6mmHg.  4. Moderate valvular aortic stenosis    -Recent hospitalization for acute hypoxic respiratory failure due to pulmonary edema due to congestive heart failure exacerbation   -Diuresed with Lasix at that time and discharged home on Lasix 20 mg p.o. daily  -Lasix held on admission as she was hypotensive, requiring multiple IV boluses  -Currently blood pressure improved  -needed supplemental O2 2 liters on 11/27  -iv fluids stopped on 11/27  -resume PTA Lasix 20 mg po daily today  -weaned off O2 today   -Monitor fluid status    # Back muscle spasms  - Unsure if related to right-sided kidney stone or more musculoskeletal; she reports that flank pain/back pain worsened the last week after she carried many groceries  - Received one dose of IV toradol earlier in admission  - Initially started on scheduled Zanaflex which eventually was held due to  hypotension  - Blood pressure normalized for now  - Ordered Zanaflex 4 mg p.o. every 6 hours as needed  - PTA on scheduled Tylenol 1000 mg p.o. twice daily, now on Tylenol 1000 mg p.o. 3 times daily  - Lidoderm patch, heating pad  - PTA on oxycodone 5 mg p.o. daily as needed, currently on oxycodone 5 mg p.o. every 6 hours as needed  - PT evaluation rec TCU  - SW consult    # Hyponatremia, lab error.  - Sodium level has normalized     # HTN  # HLD  # CAD  *Prior history of BMS x 2 to OM and diagonal in 2007. Most recent angiogram in 2017 did not reveal any significant obstructive disease.   [PTA on Plavix 75 mg p.o. daily, Lipitor 20 mg p.o. daily, hydralazine 25 mg p.o. twice daily, Toprol- mg p.o. twice daily]  - PTA hydralazine and Toprol held because of hypotension  - PTA Plavix held given plan for cystoscopy, resume today  - Currently on metoprolol 25 mg p.o. twice daily  - Resume prior to admission statin     # TIFFANI on CKD3, improved  - At baseline Cr 1.7  - Creatinine up to 2.06 on 11/26; likely prerenal in the setting of hypotension  - Received boluses of normal saline and started on maintenance IV fluid   - creatinine improved to 1.62--1.59 today  - There is some concern for fluid overload so iv fluids stopped  - Avoid nephrotoxic drugs  - BMP in a.m.    # Hyperkalemia  - Potassium 5.4 on 11/27  - Received IV fluids as above  - K 5.1 today     # DM2  - PTA on glimepiride 2 mg p.o. twice daily-held on admission  - Blood sugar checks  - Insulin sliding scale  - Hypoglycemia protocol    # MDD  - PTA bupropion, duloxetine - continue     # ELIGIO: Continue CPAP      Medical Decision Making       45 MINUTES SPENT BY ME on the date of service doing chart review, history, exam, documentation & further activities per the note.           Diet: Combination Diet Low Saturated Fat Na <2400mg Diet    DVT Prophylaxis: PCDs until urology ok's to resume  Charles Catheter: Not present  Lines: None     Cardiac Monitoring:  "ACTIVE order. Indication: Tachyarrhythmias, acute (48 hours)  Code Status: Full Code      Clinically Significant Risk Factors        # Hyperkalemia: Highest K = 5.4 mmol/L in last 2 days, will monitor as appropriate             # Hypertension: Noted on problem list       # DMII: A1C = 7.9 % (Ref range: 0.0 - 5.6 %) within past 6 months, PRESENT ON ADMISSION  # Severe Obesity: Estimated body mass index is 43.51 kg/m  as calculated from the following:    Height as of 11/16/23: 1.702 m (5' 7\").    Weight as of this encounter: 126 kg (277 lb 12.5 oz)., PRESENT ON ADMISSION     # Asthma: noted on problem list        Disposition Plan      Expected Discharge Date: 11/29/2023      Destination: inpatient rehabilitation facility            Marielos Pinto MD  Hospitalist Service  [facility]  Securely message with [employer] (more info)  Text page via [employer] Paging/Directory   ______________________________________________________________________    Interval History   Feeling better today, muscle relaxant helped with muscle spasm on right side  Up in the chair  Denies chest pain, no SOB  Reports cannot cough up some flegm  No N/V, no abd pain  Discussed with RN    Physical Exam   Vital Signs: Temp: 97.8  F (36.6  C) Temp src: Oral BP: 108/63 Pulse: 85   Resp: 24 SpO2: 100 % O2 Device: Nasal cannula Oxygen Delivery: 2 LPM  Weight: 277 lbs 12.47 oz    GEN:  Alert, awake, up in the chair, NAD  HEENT:  Normocephalic/atraumatic, no scleral icterus, no nasal discharge,   CV:  irregular rate and rhythm, no murmurs  LUNGS:  Clear to auscultation ant/lat bilaterally without rales/rhonchi/wheezing/retractions.  Symmetric chest rise on inhalation noted.  ABD: Large ventral hernia.  Abdomen is soft, BS present  EXT:  trace pretibial edema bilaterally.  No cyanosis.  No acute joint synovitis noted.  SKIN:  Dry to touch, no exanthems noted in the visualized areas.  Psych - cooperative, pleasant    Medications    - " MEDICATION INSTRUCTIONS -        acetaminophen  1,000 mg Oral TID    apixaban ANTICOAGULANT  2.5 mg Oral BID    atorvastatin  20 mg Oral Daily    buPROPion  100 mg Oral Daily    cefTRIAXone  2 g Intravenous Q24H    clopidogrel  75 mg Oral Daily    DULoxetine  60 mg Oral Daily    furosemide  20 mg Oral Daily    [Held by provider] glimepiride  2 mg Oral BID AC    [Held by provider] hydrALAZINE  25 mg Oral BID    insulin aspart  1-6 Units Subcutaneous Q4H    menthol  1 patch Topical BID    menthol   Transdermal Q8H MAGDALENA    [Held by provider] metoprolol succinate ER  100 mg Oral BID    metoprolol tartrate  25 mg Oral BID    miconazole   Topical BID    pantoprazole  20 mg Oral Daily    sodium chloride (PF)  3 mL Intracatheter Q8H       Data     Labs and Imaging results below reviewed today.  Recent Labs   Lab 11/27/23 0644 11/26/23 0744 11/25/23  2236   WBC 10.1 9.5 8.2   HGB 12.1 11.8 10.4*   HCT 40.7 38.7 33.6*   MCV 87 84 85    249 255     Recent Labs   Lab 11/28/23 0637 11/28/23 0602 11/28/23 0158 11/27/23  1725 11/27/23  1410 11/27/23  0720 11/27/23  0644     --   --   --  136  --  137   POTASSIUM 5.1  --   --   --  5.1  --  5.4*   CHLORIDE 105  --   --   --  106  --  105   CO2 19*  --   --   --  21*  --  18*   ANIONGAP 12  --   --   --  9  --  14   * 125* 107*   < > 121*   < > 130*   BUN 29.0*  --   --   --  30.2*  --  31.9*   CR 1.59*  --   --   --  1.62*  --  1.62*   GFRESTIMATED 31*  --   --   --  30*  --  30*   TELLY 8.6  --   --   --  8.8  --  8.8    < > = values in this interval not displayed.     Recent Labs   Lab 11/28/23 0637 11/28/23 0602 11/28/23  0158 11/27/23  1725 11/27/23  1410 11/27/23  0720 11/27/23 0644 11/25/23  1442 11/25/23  1215     --   --   --  136  --  137   < > 113*   POTASSIUM 5.1  --   --   --  5.1  --  5.4*   < > 3.1*   CHLORIDE 105  --   --   --  106  --  105   < > 125*   CO2 19*  --   --   --  21*  --  18*   < > 20*   ANIONGAP 12  --   --   --  9  --  14    < > <1*   * 125* 107*   < > 121*   < > 130*   < > 174*   BUN 29.0*  --   --   --  30.2*  --  31.9*   < > 35.0*   CR 1.59*  --   --   --  1.62*  --  1.62*   < > 1.75*  2.0*   GFRESTIMATED 31*  --   --   --  30*  --  30*   < > 23*  27*   TELLY 8.6  --   --   --  8.8  --  8.8   < > 9.8*   PROTTOTAL  --   --   --   --   --   --   --   --  8.2   ALBUMIN  --   --   --   --   --   --   --   --  4.2   BILITOTAL  --   --   --   --   --   --   --   --  0.5   ALKPHOS  --   --   --   --   --   --   --   --  83   AST  --   --   --   --   --   --   --   --  15   ALT  --   --   --   --   --   --   --   --  9    < > = values in this interval not displayed.       No results found for this or any previous visit (from the past 24 hour(s)).

## 2023-11-28 NOTE — PLAN OF CARE
Pleasant lady. Pt aox4, SBA gbw, 2LNC and full code. Tele afib cvr. Pt denies chest pain and SOB. Purewick in place. Scheduled tylenol given for back pain. Q4 BG checks. PRN Zanaflex for muscle spasm x2. Abx Rocephin once per day.   Plan: will need TCU at discharge.

## 2023-11-28 NOTE — PLAN OF CARE
Goal Outcome Evaluation:  Neuro- A&OX4, [place]  Most Recent Vitals- Temp: 97.7  F (36.5  C) Temp src: Oral BP: 93/55 Pulse: 90   Resp: 22 SpO2: (!) 86 % O2 Device: Nasal cannula   Tele/Cardiac- A-fib with CVR  Resp- RA  Activity- UP with 1/SBA and walker   Pain- Denied pain for this shift   Drips- ABX  Drains/Tubes- P-IVX2  Skin- Scattered bruising   GI/- low urine out put   Aggression Color- Green  COVID status- Negative  Plan- Possible discharge to TCU tomorrow.  Misc-     Mireya Lilly RN

## 2023-11-28 NOTE — PROGRESS NOTES
Urology    Our office will be calling her to arrange outpatient follow-up in the coming weeks to discuss her kidney stone treatment.     Shira Wu PA-C  [facility]

## 2023-11-28 NOTE — PLAN OF CARE
Problem: Pain Acute  Goal: Optimal Pain Control and Function  Intervention: Prevent or Manage Pain  Recent Flowsheet Documentation  Taken 11/28/2023 1600 by Kim Pearson RN  Sensory Stimulation Regulation:   auditory stimulation minimized   care clustered  Sleep/Rest Enhancement:   comfort measures   relaxation techniques promoted  Medication Review/Management: medications reviewed     Problem: Comorbidity Management  Goal: Blood Pressure in Desired Range  Intervention: Maintain Blood Pressure Management  Recent Flowsheet Documentation  Taken 11/28/2023 1600 by Kim Pearson RN  Medication Review/Management: medications reviewed     Patient is A&O x 4, VSS, neuro intact, adequate UOP, last BM on 11/26 , hospitalist notifies, and ask for laxatives order. Up to BPP with walker and SBA.

## 2023-11-29 ENCOUNTER — APPOINTMENT (OUTPATIENT)
Dept: PHYSICAL THERAPY | Facility: CLINIC | Age: 88
DRG: 872 | End: 2023-11-29
Payer: COMMERCIAL

## 2023-11-29 VITALS
TEMPERATURE: 97.4 F | OXYGEN SATURATION: 96 % | SYSTOLIC BLOOD PRESSURE: 126 MMHG | RESPIRATION RATE: 20 BRPM | BODY MASS INDEX: 43.51 KG/M2 | HEART RATE: 76 BPM | WEIGHT: 277.78 LBS | DIASTOLIC BLOOD PRESSURE: 78 MMHG

## 2023-11-29 LAB
GLUCOSE BLDC GLUCOMTR-MCNC: 134 MG/DL (ref 70–99)
GLUCOSE BLDC GLUCOMTR-MCNC: 142 MG/DL (ref 70–99)
GLUCOSE BLDC GLUCOMTR-MCNC: 143 MG/DL (ref 70–99)

## 2023-11-29 PROCEDURE — 97530 THERAPEUTIC ACTIVITIES: CPT | Mod: GP

## 2023-11-29 PROCEDURE — 250N000013 HC RX MED GY IP 250 OP 250 PS 637: Performed by: INTERNAL MEDICINE

## 2023-11-29 PROCEDURE — 99239 HOSP IP/OBS DSCHRG MGMT >30: CPT | Performed by: INTERNAL MEDICINE

## 2023-11-29 PROCEDURE — 94640 AIRWAY INHALATION TREATMENT: CPT

## 2023-11-29 PROCEDURE — 999N000157 HC STATISTIC RCP TIME EA 10 MIN

## 2023-11-29 PROCEDURE — 250N000009 HC RX 250: Performed by: INTERNAL MEDICINE

## 2023-11-29 PROCEDURE — 250N000011 HC RX IP 250 OP 636: Mod: JZ | Performed by: INTERNAL MEDICINE

## 2023-11-29 RX ORDER — LIDOCAINE 4 G/G
1 PATCH TOPICAL EVERY 24 HOURS
DISCHARGE
Start: 2023-11-29 | End: 2023-11-29

## 2023-11-29 RX ORDER — ALBUTEROL SULFATE 0.83 MG/ML
2.5 SOLUTION RESPIRATORY (INHALATION) EVERY 4 HOURS PRN
Status: DISCONTINUED | OUTPATIENT
Start: 2023-11-29 | End: 2023-11-29 | Stop reason: HOSPADM

## 2023-11-29 RX ORDER — METOPROLOL TARTRATE 25 MG/1
25 TABLET, FILM COATED ORAL 2 TIMES DAILY
Status: ON HOLD | DISCHARGE
Start: 2023-11-29 | End: 2023-12-31

## 2023-11-29 RX ORDER — CEFUROXIME AXETIL 500 MG/1
500 TABLET ORAL 2 TIMES DAILY
DISCHARGE
Start: 2023-11-29 | End: 2023-12-02

## 2023-11-29 RX ORDER — FUROSEMIDE 10 MG/ML
40 INJECTION INTRAMUSCULAR; INTRAVENOUS ONCE
Status: COMPLETED | OUTPATIENT
Start: 2023-11-29 | End: 2023-11-29

## 2023-11-29 RX ORDER — OXYCODONE HYDROCHLORIDE 5 MG/1
5 TABLET ORAL DAILY PRN
Qty: 10 TABLET | Refills: 0 | Status: SHIPPED | OUTPATIENT
Start: 2023-11-29 | End: 2023-12-04

## 2023-11-29 RX ORDER — ALBUTEROL SULFATE 90 UG/1
2 AEROSOL, METERED RESPIRATORY (INHALATION) EVERY 6 HOURS PRN
Status: DISCONTINUED | OUTPATIENT
Start: 2023-11-29 | End: 2023-11-29 | Stop reason: HOSPADM

## 2023-11-29 RX ORDER — FLUTICASONE FUROATE AND VILANTEROL 100; 25 UG/1; UG/1
1 POWDER RESPIRATORY (INHALATION) DAILY
Status: DISCONTINUED | OUTPATIENT
Start: 2023-11-29 | End: 2023-11-29 | Stop reason: HOSPADM

## 2023-11-29 RX ORDER — NICOTINE POLACRILEX 4 MG
15-30 LOZENGE BUCCAL
Status: DISCONTINUED | OUTPATIENT
Start: 2023-11-29 | End: 2023-11-29 | Stop reason: HOSPADM

## 2023-11-29 RX ORDER — DEXTROSE MONOHYDRATE 25 G/50ML
25-50 INJECTION, SOLUTION INTRAVENOUS
Status: DISCONTINUED | OUTPATIENT
Start: 2023-11-29 | End: 2023-11-29 | Stop reason: HOSPADM

## 2023-11-29 RX ORDER — TRIAMCINOLONE ACETONIDE 1 MG/G
CREAM TOPICAL PRN
DISCHARGE
Start: 2023-11-29 | End: 2023-12-04

## 2023-11-29 RX ORDER — ACETAMINOPHEN 325 MG/1
325-650 TABLET ORAL EVERY 6 HOURS PRN
DISCHARGE
Start: 2023-11-29 | End: 2023-12-04

## 2023-11-29 RX ADMIN — DULOXETINE HYDROCHLORIDE 60 MG: 60 CAPSULE, DELAYED RELEASE ORAL at 08:05

## 2023-11-29 RX ADMIN — FUROSEMIDE 40 MG: 10 INJECTION, SOLUTION INTRAMUSCULAR; INTRAVENOUS at 09:47

## 2023-11-29 RX ADMIN — ATORVASTATIN CALCIUM 20 MG: 20 TABLET, FILM COATED ORAL at 08:06

## 2023-11-29 RX ADMIN — ACETAMINOPHEN 1000 MG: 500 TABLET, FILM COATED ORAL at 08:05

## 2023-11-29 RX ADMIN — FUROSEMIDE 20 MG: 20 TABLET ORAL at 08:05

## 2023-11-29 RX ADMIN — APIXABAN 2.5 MG: 2.5 TABLET, FILM COATED ORAL at 08:05

## 2023-11-29 RX ADMIN — BUPROPION HYDROCHLORIDE 100 MG: 100 TABLET, FILM COATED, EXTENDED RELEASE ORAL at 08:05

## 2023-11-29 RX ADMIN — MICONAZOLE NITRATE: 2 POWDER TOPICAL at 08:09

## 2023-11-29 RX ADMIN — PANTOPRAZOLE SODIUM 20 MG: 20 TABLET, DELAYED RELEASE ORAL at 08:05

## 2023-11-29 RX ADMIN — ALBUTEROL SULFATE 2.5 MG: 2.5 SOLUTION RESPIRATORY (INHALATION) at 09:12

## 2023-11-29 RX ADMIN — METOPROLOL TARTRATE 25 MG: 25 TABLET, FILM COATED ORAL at 08:06

## 2023-11-29 RX ADMIN — OXYCODONE HYDROCHLORIDE 5 MG: 5 TABLET ORAL at 02:01

## 2023-11-29 RX ADMIN — MENTHOL 1 PATCH: 205.5 PATCH TOPICAL at 08:09

## 2023-11-29 RX ADMIN — CLOPIDOGREL BISULFATE 75 MG: 75 TABLET ORAL at 08:05

## 2023-11-29 RX ADMIN — FLUTICASONE FUROATE AND VILANTEROL TRIFENATATE 1 PUFF: 100; 25 POWDER RESPIRATORY (INHALATION) at 11:08

## 2023-11-29 ASSESSMENT — ACTIVITIES OF DAILY LIVING (ADL)
ADLS_ACUITY_SCORE: 35

## 2023-11-29 NOTE — PLAN OF CARE
Physical Therapy Discharge Summary    Reason for therapy discharge:    Discharged to transitional care facility.    Progress towards therapy goal(s). See goals on Care Plan in [facility] electronic health record for goal details.  Goals partially met.  Barriers to achieving goals:   discharge from facility.    Therapy recommendation(s):    Continued therapy is recommended.  Rationale/Recommendations:  To further increase independence with mobility.

## 2023-11-29 NOTE — PLAN OF CARE
Goal Outcome Evaluation:    Pt discharging via POV with friend, Marleen. All belongings with patient. Discharge instructions provided and questions encouraged. TCU packet with script included handed to Marleen, importance stressed to give to staff at facility. Denies pain. IVs discontinued.

## 2023-11-29 NOTE — PROGRESS NOTES
Care Management Discharge Note    Discharge Date: 11/29/2023       Discharge Disposition: Transitional Care, Skilled Nursing Facility    Discharge Services: None    Discharge DME:      Discharge Transportation: family or friend will provide    Private pay costs discussed: Not applicable    Does the patient's insurance plan have a 3 day qualifying hospital stay waiver?  Yes     Which insurance plan 3 day waiver is available? Alternative insurance waiver    Will the waiver be used for post-acute placement? No    PAS Confirmation Code: [id number]  Patient/family educated on Medicare website which has current facility and service quality ratings:      Education Provided on the Discharge Plan: Yes  Persons Notified of Discharge Plans:  Patient  Patient/Family in Agreement with the Plan:      Handoff Referral Completed: Yes    Additional Information:  SW received discharge orders and paper scripts and faxed to facility. Patient has transport via friend Michelle at 1430 to [facility] TCU. PAS previously completed and faxed/added to the chart. SW called presby and confirmed discharge plans.     Raulito Callahan, STEF    [facility]

## 2023-11-29 NOTE — DISCHARGE SUMMARY
"[facility]  Hospitalist Discharge Summary      Date of Admission:  11/25/2023  Date of Discharge:  11/29/2023  2:42 PM  Discharging Provider: Marielos Pinto MD  Discharge Service: Hospitalist Service    Discharge Diagnoses   Sepsis d/t right-sided pyelonephritis   Rt kidney stone  Back muscle spasm  Afib with RVR  HFpEF  Moderate mitral stenosis  Moderate aortic stenosis  HTN  HLD  CAD  TIFFANI on CKD stage 3, improved  Hyperkalemia- resolved  DM type 2  MDD  ELIGIO      Clinically Significant Risk Factors     # DMII: A1C = 7.9 % (Ref range: 0.0 - 5.6 %) within past 6 months  # Severe Obesity: Estimated body mass index is 43.51 kg/m  as calculated from the following:    Height as of 11/16/23: 1.702 m (5' 7\").    Weight as of this encounter: 126 kg (277 lb 12.5 oz).       Follow-ups Needed After Discharge   Follow-up Appointments     Follow Up and recommended labs and tests      Follow up with Nursing home physician in 2 days The following labs/tests   are recommended: BMP. Monitor blood pressures and heart rate  Follow up with primary care provider after discharge home.  Follow up with Urology for further management of right kidney stone  Follow up with cardiology as scheduled.          Unresulted Labs Ordered in the Past 30 Days of this Admission       No orders found from 10/26/2023 to 11/26/2023.            Discharge Disposition   Discharged to short-term care facility  Condition at discharge: Good    Hospital Course   Moira Andre is a 89 year old female with PMH of HFpEF, recently diagnosed atrial fibrillation, CKD, DM2, MDD, ELIGIO, who presents with muscle spasms/right flank pain and atrial fibrillation with RVR.     CT imaging reviewed large right nephrolithasis (1 x 0.7cm) with fat stranding clinical concerning for right-sided pyelonephritis.    11/26 overnight- she was persistently hypotensive and evaluated on several occasions (1750cc IVF bolues given overnight) for treatment of " developing sepsis.    For a detailed HPI -please refer to H&P done by Dr Jozef Oquendo on 11/25/2023.     # Sepsis d/t right-sided pyelonephritis   # Rt kidney stone  - was tachycardic in ER (Afib with RVR), but BP was stable initially  - 11/26 overnight was hypotensive, RRT called, received few boluses of NS  - UA positive  - UC- positive for 2 strains >100,000 col E coli, pansensitive  - CT abd/pelvis- 1 cm stone in the right renal pelvis with marked adjacent fat stranding indicating superimposed infection. Recommend correlation with urinalysis. No hydronephrosis. Additional 1 mm nonobstructing stone in the right kidney.  - started on Ceftriaxone 2gm iv daily  - given CT findings, there was concern for infected kidney stone  - Urology consulted  - her right flank pain seems MSK but also given above findings, her right side flank pain may represent renal colic  - initially plan cystoscopy with ureteral stent placement but cancelled; Urology does not feel that her flank pain is related to kidney stone; no evidence of hydronephrosis on CT  - continue Ceftriaxone  - no BC done on admission   - vitals- BP, HR improved  - afebrile, no leucocytosis  - discharge on Ceftin 500 mg po BID for 3 more days  - PT/OT rec TCU  - needs to follow up with Urology as outpatient      #  A fib with RVR, improved  *New clinical diagnosis in the last month  *Recent admission from 10/10 to 10/13/2023 for A-fib with RVR (new diagnosis) note, acute hypoxic respiratory failure due to CHF exacerbation with preserved ejection fraction and pulmonary edema  - Cardiology was consulted at that time; she was diuresed with IV Lasix; she was started on oral Eliquis; for rate control her Toprol-XL dose was increased to 100 mg p.o. twice daily  - PTA Toprol resumed on admission but then held because of hypotension  - Had been on Cardizem drip now discontinued  - Started on low-dose Lopressor 25 mg p.o. twice daily with holding parameters  - Heart rate  improved in 80's today  - PTA Eliquis was held given plan for cystoscopy, resumed on 11/27/2023     # HFpEF  # Moderate mitral stenosis  # Moderate aortic stenosis  *Echo 10/12/23  1. The left ventricle is normal in structure, function and size. The visual  ejection fraction is estimated at 55%.  2. The right ventricle is normal in structure, function and size.  3. There is moderate mitral stenosis. The mean mitral valve gradient is 6mmHg.  4. Moderate valvular aortic stenosis     -Recent hospitalization for acute hypoxic respiratory failure due to pulmonary edema due to congestive heart failure exacerbation   -Diuresed with Lasix at that time and discharged home on Lasix 20 mg p.o. daily  -Lasix held on admission as she was hypotensive, requiring multiple IV boluses  -Currently blood pressure improved  -needed supplemental O2 2 liters on 11/27  -iv fluids stopped on 11/27  -resumed PTA Lasix 20 mg po daily on 11/28; an extra dose of Lasix 40 mg ivX1 given on 11/29  -weaned off O2 today   -Monitor fluid status in TCU     # Back muscle spasms  - Unsure if related to right-sided kidney stone or more musculoskeletal; she reports that flank pain/back pain worsened the last week after she carried many groceries  - Received one dose of IV toradol earlier in admission  - Initially started on scheduled Zanaflex which eventually was held due to hypotension  - Blood pressure normalized for now  - Ordered Zanaflex 4 mg p.o. every 6 hours as needed  - continue PTA on scheduled Tylenol 1000 mg p.o. twice daily, add Tylenol 325-650 mg po q6h prn  - Menthol patch, heating pad  - continue PTA oxycodone 5 mg p.o. daily as needed  - PT evaluation rec TCU  - SW consult     # Hyponatremia, lab error.  - Sodium level has normalized     # HTN  # HLD  # CAD  *Prior history of BMS x 2 to OM and diagonal in 2007. Most recent angiogram in 2017 did not reveal any significant obstructive disease.   [PTA on Plavix 75 mg p.o. daily, Lipitor 20  mg p.o. daily, hydralazine 25 mg p.o. twice daily, Toprol- mg p.o. twice daily]  - PTA hydralazine and Toprol held because of hypotension  - PTA Plavix held given plan for cystoscopy, resumed on 11/27  - started on metoprolol 25 mg p.o. twice daily  - monitor BP, adjust the meds as needed  - Resume prior to admission statin     # TIFFANI on CKD3, improved  - At baseline Cr 1.7  - Creatinine up to 2.06 on 11/26; likely prerenal in the setting of hypotension  - Received boluses of normal saline and started on maintenance IV fluid   - creatinine improved to 1.62--1.59  - There was some concern for fluid overload so iv fluids stopped  - resumed PTA Lasix 20 mg po daily  - repeat BMP n 2 days in TCU     # Hyperkalemia  - Potassium 5.4 on 11/27  - Received IV fluids as above  - K 5.1      # DM2  - PTA on glimepiride 2 mg p.o. twice daily-held on admission and ISS ordered  - resume PTA oral diabetic meds after discharge      # MDD  - PTA bupropion, duloxetine - continue     # ELIGIO: Continue CPAP    Consultations This Hospital Stay   PHYSICAL THERAPY ADULT IP CONSULT  UROLOGY IP CONSULT  CARE MANAGEMENT / SOCIAL WORK IP CONSULT  PHYSICAL THERAPY ADULT IP CONSULT  OCCUPATIONAL THERAPY ADULT IP CONSULT    Code Status   Full Code    Time Spent on this Encounter   I, Marielos Pinto MD, personally saw the patient today and spent greater than 30 minutes discharging this patient.       Marielos Pinto MD  [facility]  [address] [place] [address] [address], [address]  [facility] [address]  Phone: [phone]  ______________________________________________________________________    Physical Exam   Vital Signs: Temp: 97.6  F (36.4  C) Temp src: Oral BP: 135/87 Pulse: 76   Resp: 20 SpO2: 96 % O2 Device: Nasal cannula Oxygen Delivery: 2 LPM  Weight: 277 lbs 12.47 oz    GEN:  Alert, awake, up in the chair, NAD  HEENT:  Normocephalic/atraumatic, no scleral icterus, no nasal discharge,   CV:  irregular rate and  rhythm, no murmurs  LUNGS:  bilateral air entry, very mild, intermittent wheezing  ABD: Large ventral hernia.  Abdomen is soft, BS present  EXT:  trace pretibial edema bilaterally.  No cyanosis.  No acute joint synovitis noted.  SKIN:  Dry to touch, no exanthems noted in the visualized areas.  Psych - cooperative, pleasant       Primary Care Physician   Maryan Churchill    Discharge Orders      General info for SNF    Length of Stay Estimate: Short Term Care: Estimated # of Days <30  Condition at Discharge: Improving  Level of care:skilled   Rehabilitation Potential: Fair  Admission H&P remains valid and up-to-date: Yes  Recent Chemotherapy: N/A  Use Nursing Home Standing Orders: Yes     Mantoux instructions    Give two-step Mantoux (PPD) Per Facility Policy Yes     Reason for your hospital stay    Sepsis due to UTI/Pyelonephritis  Muscle spasm     Glucose monitor nursing POCT    Before meals and at bedtime     Daily weights    Call Provider for weight gain of more than 2 pounds per day or 5 pounds per week.     Activity - Up with nursing assistance     Follow Up and recommended labs and tests    Follow up with Nursing home physician in 2 days The following labs/tests are recommended: BMP. Monitor blood pressures and heart rate  Follow up with primary care provider after discharge home.  Follow up with Urology for further management of right kidney stone  Follow up with cardiology as scheduled.     Full Code     Physical Therapy Adult Consult    Evaluate and treat as clinically indicated.    Reason:  Deconditioning     Occupational Therapy Adult Consult    Evaluate and treat as clinically indicated.    Reason:  Deconditioning     Fall precautions     Diet    Follow this diet upon discharge: Orders Placed This Encounter      Combination Diet Low Saturated Fat Na <2400mg Diet       Significant Results and Procedures   Most Recent 3 CBC's:  Recent Labs   Lab Test 11/27/23  0644 11/26/23  0744 11/25/23  2236   WBC 10.1  9.5 8.2   HGB 12.1 11.8 10.4*   MCV 87 84 85    249 255     Most Recent 3 BMP's:  Recent Labs   Lab Test 11/29/23  0901 11/29/23  0606 11/29/23  0203 11/28/23  1306 11/28/23  0637 11/27/23  1725 11/27/23  1410 11/27/23  0720 11/27/23  0644   NA  --   --   --   --  136  --  136  --  137   POTASSIUM  --   --   --   --  5.1  --  5.1  --  5.4*   CHLORIDE  --   --   --   --  105  --  106  --  105   CO2  --   --   --   --  19*  --  21*  --  18*   BUN  --   --   --   --  29.0*  --  30.2*  --  31.9*   CR  --   --   --   --  1.59*  --  1.62*  --  1.62*   ANIONGAP  --   --   --   --  12  --  9  --  14   TELLY  --   --   --   --  8.6  --  8.8  --  8.8   * 143* 134*   < > 148*   < > 121*   < > 130*    < > = values in this interval not displayed.     Most Recent 2 LFT's:  Recent Labs   Lab Test 11/25/23  1215 10/11/23  1104   AST 15 19   ALT 9 26   ALKPHOS 83 88   BILITOTAL 0.5 0.3     Most Recent 3 INR's:  Recent Labs   Lab Test 07/19/17  0655 09/21/16  1300   INR 0.93 0.99     Most Recent 3 Creatinines:  Recent Labs   Lab Test 11/28/23  0637 11/27/23  1410 11/27/23  0644   CR 1.59* 1.62* 1.62*     Most Recent 3 Hemoglobins:  Recent Labs   Lab Test 11/27/23  0644 11/26/23  0744 11/25/23  2236   HGB 12.1 11.8 10.4*     Most Recent 3 Troponin's:  Recent Labs   Lab Test 03/16/19  0726 11/01/18  1242 11/01/18  0345   TROPI <0.015 <0.015 <0.015     Most Recent 3 BNP's:  Recent Labs   Lab Test 11/25/23  1215 10/10/23  1438 04/19/23  1903   NTBNPI 3,302* 4,629* 1,163     7-Day Micro Results       Collected Updated Procedure Result Status      11/25/2023 1253 11/27/2023 2104 Urine Culture [[id number]]    (Abnormal)   Urine, Catheter    Final result Component Value   Culture >100,000 CFU/mL Escherichia coli    >100,000 CFU/mL Escherichia coli        Susceptibility        Escherichia coli (1)      GIBSON      Ampicillin 8 ug/mL Susceptible      Ampicillin/ Sulbactam 4 ug/mL Susceptible      Cefazolin <=4 ug/mL Susceptible  [1]        Cefepime <=1 ug/mL Susceptible      Cefoxitin <=4 ug/mL Susceptible      Ceftazidime <=1 ug/mL Susceptible      Ceftriaxone <=1 ug/mL Susceptible      Ciprofloxacin <=0.25 ug/mL Susceptible      Gentamicin <=1 ug/mL Susceptible      Levofloxacin <=0.12 ug/mL Susceptible      Nitrofurantoin <=16 ug/mL Susceptible      Piperacillin/Tazobactam <=4 ug/mL Susceptible      Tobramycin <=1 ug/mL Susceptible      Trimethoprim/Sulfamethoxazole <=1/19 ug/mL Susceptible                   [1]  Cefazolin GIBSON breakpoints are for the treatment of uncomplicated urinary tract infections. For the treatment of systemic infections, please contact the laboratory for additional testing.              Susceptibility        Escherichia coli (2)      GIBSON      Ampicillin 4 ug/mL Susceptible      Ampicillin/ Sulbactam <=2 ug/mL Susceptible      Cefazolin <=4 ug/mL Susceptible  [1]       Cefepime <=1 ug/mL Susceptible      Cefoxitin <=4 ug/mL Susceptible      Ceftazidime <=1 ug/mL Susceptible      Ceftriaxone <=1 ug/mL Susceptible      Ciprofloxacin <=0.25 ug/mL Susceptible      Gentamicin <=1 ug/mL Susceptible      Levofloxacin <=0.12 ug/mL Susceptible      Nitrofurantoin 32 ug/mL Susceptible      Piperacillin/Tazobactam <=4 ug/mL Susceptible      Tobramycin <=1 ug/mL Susceptible      Trimethoprim/Sulfamethoxazole <=1/19 ug/mL Susceptible                   [1]  Cefazolin GIBSON breakpoints are for the treatment of uncomplicated urinary tract infections. For the treatment of systemic infections, please contact the laboratory for additional testing.                         Most Recent TSH and T4:  Recent Labs   Lab Test 10/11/23  1104   TSH 1.65     Most Recent Hemoglobin A1c:  Recent Labs   Lab Test 10/26/23  1342   A1C 7.9*     Most Recent 6 glucoses:  Recent Labs   Lab Test 11/29/23  0901 11/29/23  0606 11/29/23  0203 11/28/23  2135 11/28/23  1642 11/28/23  1413   * 143* 134* 108* 101* 112*     Most Recent Urinalysis:  Recent Labs    Lab Test 11/25/23  1253 08/23/23  1007   COLOR Orange* Yellow   APPEARANCE Cloudy* Slightly Cloudy*   URINEGLC Negative Negative   URINEBILI Negative Negative   URINEKETONE Negative Negative   SG 1.024 1.015   UBLD Small* Small*   URINEPH 5.0 5.5   PROTEIN 100* 100*   UROBILINOGEN  --  0.2   NITRITE Negative Positive*   LEUKEST Large* Moderate*   RBCU 84* 2-5*   WBCU >182* 25-50*   ,   Results for orders placed or performed during the hospital encounter of 11/25/23   CT Chest w/o Contrast    Narrative    EXAM: CT CHEST W/O CONTRAST  LOCATION: [facility]  DATE: 11/25/2023    INDICATION: upper back pain, PE, DISSECTION  COMPARISON: 10/31/2018  TECHNIQUE: CT chest without IV contrast. Multiplanar reformats were obtained. Dose reduction techniques were used.  CONTRAST: None.    FINDINGS:   LUNGS AND PLEURA: Bibasilar atelectasis. No focal consolidation or effusion.    MEDIASTINUM/AXILLAE: Heart is normal in size. No mediastinal, axillary, or hilar adenopathy. Nonenlarged mediastinal lymph nodes are noted. Grossly no dissection identified.      CORONARY ARTERY CALCIFICATION: Severe.    UPPER ABDOMEN: Partially visualized stone noted within the left collecting system measuring approximately 1.1 cm.Large cyst noted within the inferior pole of the right kidney.  Benign and needs no further follow-up.    MUSCULOSKELETAL: Degenerative changes of the spine. Old right rib fractures.      Impression    IMPRESSION:   1.  Limited examination due to lack of IV contrast. Cannot assess for PE or dissection. Grossly no dissection is identified.  2.  Large stone noted within the right collecting system which is partially visualized.  3.  Bibasilar atelectasis with no focal consolidation to suggest pneumonia.     CT Abdomen Pelvis w/o Contrast    Narrative    EXAM: CT ABDOMEN PELVIS W/O CONTRAST  LOCATION: [facility]  DATE: 11/26/2023    INDICATION: Large stone noted in the right  renal collecting system, concerning for hydronephrosis  COMPARISON: None.  TECHNIQUE: CT scan of the abdomen and pelvis was performed without IV contrast. Multiplanar reformats were obtained. Dose reduction techniques were used.  CONTRAST: None.    FINDINGS:   LOWER CHEST: Mild scattered atelectasis. No consolidation or pleural effusion.    HEPATOBILIARY: Normal. Gallbladder is surgically absent.    PANCREAS: Normal.    SPLEEN: Normal.    ADRENAL GLANDS: Normal.    KIDNEYS/BLADDER: Multiple bilateral renal cysts measuring up to 4.8 cm in the right kidney appears benign and do not require follow-up. A 1 x 0.7 cm stone is seen in the right renal pelvis with marked adjacent fat stranding. No right hydronephrosis. A 1   mm nonobstructing stones also seen in the right kidney. No left hydroureteronephrosis. Bladder is decompressed and grossly unremarkable.    BOWEL: Colonic diverticulosis without acute diverticulitis. No focal bowel thickening or obstruction.    LYMPH NODES: Normal.    VASCULATURE: Mild aortoiliac atherosclerotic calcifications. No abdominal aortic aneurysm.    PELVIC ORGANS: Status post hysterectomy. No abnormal adnexal masses.    MUSCULOSKELETAL: Multilevel mild and moderate degenerative disc space narrowing with vacuum disc in the lower thoracic and lumbar spine. No suspicious osseous lesions or acute fractures. A large ventral abdominal hernia is present containing   nonobstructed colon and small bowel.        Impression    IMPRESSION:     1.  1 cm stone in the right renal pelvis with marked adjacent fat stranding indicating superimposed infection. Recommend correlation with urinalysis. No hydronephrosis.    2.  Additional 1 mm nonobstructing stone in the right kidney.    3.  Large ventral abdominal hernia containing nonobstructed colon and small bowel.    4.  Colonic diverticulosis without acute diverticulitis.         Discharge Medications   Current Discharge Medication List        START taking  these medications    Details   !! acetaminophen (TYLENOL) 325 MG tablet Take 1-2 tablets (325-650 mg) by mouth every 6 hours as needed for mild pain Do not exceed 4000mg /day    Associated Diagnoses: Back muscle spasm      cefuroxime (CEFTIN) 500 MG tablet Take 1 tablet (500 mg) by mouth 2 times daily for 3 days    Associated Diagnoses: Pyelonephritis      metoprolol tartrate (LOPRESSOR) 25 MG tablet Take 1 tablet (25 mg) by mouth 2 times daily    Comments: Hold for SBP<100 or HR<55  Associated Diagnoses: Atrial fibrillation with RVR (H)      miconazole (MICATIN) 2 % external powder Apply topically 2 times daily for 5 days    Associated Diagnoses: Rash      tiZANidine (ZANAFLEX) 4 MG tablet Take 1 tablet (4 mg) by mouth 3 times daily as needed for muscle spasms    Associated Diagnoses: Back muscle spasm       !! - Potential duplicate medications found. Please discuss with provider.        CONTINUE these medications which have CHANGED    Details   camphor-menthol-methyl salicylate 3.1-6-10 % PTCH Apply 1 patch topically as needed (hand pain) Or back pain    Associated Diagnoses: Other chronic pain      oxyCODONE (ROXICODONE) 5 MG tablet Take 1 tablet (5 mg) by mouth daily as needed for pain  Qty: 10 tablet, Refills: 0    Associated Diagnoses: Other chronic pain      triamcinolone (KENALOG) 0.1 % external cream Apply topically as needed for irritation    Associated Diagnoses: Rash           CONTINUE these medications which have NOT CHANGED    Details   !! acetaminophen (TYLENOL) 500 MG tablet Take 1,000 mg by mouth 2 times daily      albuterol (PROAIR HFA/PROVENTIL HFA/VENTOLIN HFA) 108 (90 Base) MCG/ACT inhaler Inhale 2 puffs into the lungs every 6 hours as needed for shortness of breath, wheezing or cough For shortness of breath    Comments: Pharmacy may dispense brand covered by insurance (Proair, or proventil or ventolin or generic albuterol inhaler)  Associated Diagnoses: SOB (shortness of breath)       atorvastatin (LIPITOR) 20 MG tablet TAKE 1 TABLET BY MOUTH DAILY FOR CHOLESTEROL  Qty: 100 tablet, Refills: 2    Comments: Please send a replace/new response with 100-Day Supply if appropriate to maximize member benefit. Requesting 1 year supply.  Associated Diagnoses: Hyperlipidemia LDL goal <70      buPROPion (WELLBUTRIN SR) 100 MG 12 hr tablet Take 1 tablet (100 mg) by mouth daily for mood  Qty: 90 tablet, Refills: 3    Comments: Profile Rx: patient will contact pharmacy when needed  Associated Diagnoses: Moderate major depression (H)      calcium carbonate (TUMS) 500 MG chewable tablet Take 1 chew tab by mouth 2 times daily as needed for heartburn      clopidogrel (PLAVIX) 75 MG tablet TAKE 1 TABLET BY MOUTH DAILY  Qty: 100 tablet, Refills: 1    Comments: Please send a replace/new response with 100-Day Supply if appropriate to maximize member benefit. Requesting 1 year supply.  Associated Diagnoses: Coronary artery disease involving native coronary artery of native heart without angina pectoris      DULoxetine (CYMBALTA) 60 MG capsule Take 1 capsule (60 mg) by mouth daily  Qty: 90 capsule, Refills: 3    Comments: Profile Rx: patient will contact pharmacy when needed  Associated Diagnoses: Moderate major depression (H)      fexofenadine (ALLEGRA) 180 MG tablet Take 180 mg by mouth every evening      fluticasone (FLONASE) 50 MCG/ACT nasal spray Spray 1 spray into both nostrils daily as needed       fluticasone-salmeterol (WIXELA INHUB) 100-50 MCG/ACT inhaler USE 1 INHALATION BY MOUTH EVERY  12 HOURS  Qty: 180 each, Refills: 3    Associated Diagnoses: Mild intermittent asthma without complication      furosemide (LASIX) 20 MG tablet Take 1 tablet (20 mg) by mouth daily  Qty: 90 tablet, Refills: 1    Associated Diagnoses: Heart failure with preserved ejection fraction, NYHA class I (H)      glimepiride (AMARYL) 2 MG tablet Take 1 tablet (2 mg) by mouth 2 times daily (before meals)  Qty: 200 tablet, Refills: 2     Comments: Dose is increased  Associated Diagnoses: Type 2 diabetes mellitus with diabetic nephropathy, without long-term current use of insulin (H)      Menthol (Topical Analgesic) 7.5 % (Roll) MISC Apply to affected area every morning      Multiple Vitamins-Minerals (HAIR SKIN AND NAILS FORMULA PO) Take 1 tablet by mouth daily      nitroGLYcerin (NITROSTAT) 0.4 MG sublingual tablet FOR CHEST PAIN PLACE 1 TABLET  UNDER TONGUE EVERY 5 MINUTES FOR 3 DOSES. IF SYMPTOMS PERSIST 5  MINUTES AFTER 1ST DOSE CALL 911  Qty: 25 tablet, Refills: 0    Associated Diagnoses: Chest pain, unspecified type      pantoprazole (PROTONIX) 20 MG EC tablet Take 1 tablet (20 mg) by mouth daily  Qty: 90 tablet, Refills: 3    Associated Diagnoses: Gastroesophageal reflux disease without esophagitis      Vitamin D3 (CHOLECALCIFEROL) 25 mcg (1000 units) tablet Take 25 mcg by mouth daily      apixaban ANTICOAGULANT (ELIQUIS) 2.5 MG tablet Take 1 tablet (2.5 mg) by mouth 2 times daily  Qty: 180 tablet, Refills: 1    Associated Diagnoses: Atrial fibrillation with RVR (H)       !! - Potential duplicate medications found. Please discuss with provider.        STOP taking these medications       hydrALAZINE (APRESOLINE) 25 MG tablet Comments:   Reason for Stopping:         metoprolol succinate ER (TOPROL XL) 100 MG 24 hr tablet Comments:   Reason for Stopping:             Allergies   Allergies   Allergen Reactions    Aspirin Hives     Reaction occurred during childhood.     Lisinopril Cough    Losartan      Hyperkalemia      Metformin      Elevated lactic acid    Minocycline      Yellow Dye Allergy. Minocycline has Yellow Dye #10.    Mounjaro [Tirzepatide] Diarrhea and GI Disturbance    Salicylates Hives    Yellow Dye Hives     Rxn to yellow tablet. Eyes swelled shut.     Yellow Dyes (Non-Tartrazine) Hives

## 2023-11-29 NOTE — PLAN OF CARE
Pleasant lady. Pt aox4, SBA gbw, RA and full code. Tele afib cvr. Pt denies chest and SOB. Pt endorses back pain, treated with ice/hot, scheduled tylenol, oxycodone and prn muscle relaxant. External cath in place. Continue abx Rocephin once per day. Continue Q4 BG checks.   Plan: Discharge to [facility] tomorrow. Friend picking pt up at 1430.

## 2023-11-29 NOTE — ED TRIAGE NOTES
Triage Assessment (Adult)       Row Name 11/25/23 1146          Triage Assessment    Airway WDL WDL        Respiratory WDL    Respiratory WDL WDL        Skin Circulation/Temperature WDL    Skin Circulation/Temperature WDL WDL        Cardiac WDL    Cardiac WDL WDL        Peripheral/Neurovascular WDL    Peripheral Neurovascular WDL WDL        Cognitive/Neuro/Behavioral WDL    Cognitive/Neuro/Behavioral WDL WDL

## 2023-11-30 ENCOUNTER — TELEPHONE (OUTPATIENT)
Dept: UROLOGY | Facility: CLINIC | Age: 88
End: 2023-11-30

## 2023-11-30 ENCOUNTER — PATIENT OUTREACH (OUTPATIENT)
Dept: CARE COORDINATION | Facility: CLINIC | Age: 88
End: 2023-11-30

## 2023-11-30 ENCOUNTER — TRANSITIONAL CARE UNIT VISIT (OUTPATIENT)
Dept: GERIATRICS | Facility: CLINIC | Age: 88
End: 2023-11-30
Payer: COMMERCIAL

## 2023-11-30 VITALS
RESPIRATION RATE: 18 BRPM | BODY MASS INDEX: 44.23 KG/M2 | DIASTOLIC BLOOD PRESSURE: 98 MMHG | OXYGEN SATURATION: 92 % | HEART RATE: 76 BPM | HEIGHT: 67 IN | TEMPERATURE: 97.4 F | SYSTOLIC BLOOD PRESSURE: 170 MMHG | WEIGHT: 281.8 LBS

## 2023-11-30 DIAGNOSIS — M62.830 BACK MUSCLE SPASM: ICD-10-CM

## 2023-11-30 DIAGNOSIS — N18.32 STAGE 3B CHRONIC KIDNEY DISEASE (H): ICD-10-CM

## 2023-11-30 DIAGNOSIS — E66.2 OBESITY HYPOVENTILATION SYNDROME (H): ICD-10-CM

## 2023-11-30 DIAGNOSIS — F41.9 ANXIETY: ICD-10-CM

## 2023-11-30 DIAGNOSIS — I48.91 ATRIAL FIBRILLATION WITH RAPID VENTRICULAR RESPONSE (H): ICD-10-CM

## 2023-11-30 DIAGNOSIS — J45.20 MILD INTERMITTENT ASTHMA WITHOUT COMPLICATION: ICD-10-CM

## 2023-11-30 DIAGNOSIS — I10 ESSENTIAL HYPERTENSION: ICD-10-CM

## 2023-11-30 DIAGNOSIS — E11.21 TYPE 2 DIABETES MELLITUS WITH DIABETIC NEPHROPATHY, WITHOUT LONG-TERM CURRENT USE OF INSULIN (H): ICD-10-CM

## 2023-11-30 DIAGNOSIS — R53.81 PHYSICAL DECONDITIONING: ICD-10-CM

## 2023-11-30 DIAGNOSIS — I50.33 ACUTE ON CHRONIC HEART FAILURE WITH PRESERVED EJECTION FRACTION (H): ICD-10-CM

## 2023-11-30 DIAGNOSIS — N20.0 NEPHROLITHIASIS: Primary | ICD-10-CM

## 2023-11-30 DIAGNOSIS — N12 PYELONEPHRITIS: ICD-10-CM

## 2023-11-30 DIAGNOSIS — G47.33 OSA ON CPAP: ICD-10-CM

## 2023-11-30 DIAGNOSIS — I25.10 CORONARY ARTERY DISEASE INVOLVING NATIVE CORONARY ARTERY OF NATIVE HEART WITHOUT ANGINA PECTORIS: ICD-10-CM

## 2023-11-30 PROCEDURE — 99305 1ST NF CARE MODERATE MDM 35: CPT | Performed by: INTERNAL MEDICINE

## 2023-11-30 NOTE — PROGRESS NOTES
Clinic Care Coordination Contact  Care Coordination Transition Communication    Clinical Data: Patient was hospitalized at [facility]  from 11/25/23 to 11/19/23 with diagnosis of Sepsis d/t right-sided pyelonephritis   Rt kidney stone  Back muscle spasm  Afib with RVR  HFpEF  Moderate mitral stenosis  Moderate aortic stenosis  HTN  HLD  CAD  TIFFANI on CKD stage 3, improved  Hyperkalemia- resolved  DM type 2  MDD  ELIGIO .     Assessment: Patient has transitioned to TCU/ARU for short term rehabilitation:    Facility Name: [facility]  Transition Communication:  Notified facility of Primary Care- Care Coordination support via Epic fax.    Plan: Care Coordinator will await notification from facility staff informing of patient's discharge plans/needs. Care Coordinator will review chart and outreach to facility staff every 4 weeks and as needed.     Maria L Mckeon,  [facility]  Clinic Care Coordinator  [facility]  [phone]  [email or web address]

## 2023-11-30 NOTE — PROGRESS NOTES
"Moira Andre is a 89 year old female seen November 30, 2023 at [facility] TCU where she was admitted after [facility] hospitalization 11/25-29 for right kidney stone, right pyelonephritis and sepsis.   UC +for a pansensitive E coli.    She was also found to be in atrial fib with RVR, needed IVF for hypotension and was treated with ceftriaxone /Ceftin.  Right flank pain thought to be musculoskeletal and workup of nephrolithiasis deferred to outpatient follow up    Pt improved and discharged to TCU for ongoing recovery and Rehab.     Pt had a prior [facility] hospitalization in October with new dx of atrial fib, with pulmonary edema and hypoxic respiratory failure due to CHF exacerbation.  She was started on furosemide, low dose metoprolol and apixaban at that time      Today pt is seen in her room lying abed.   Had a shower and walked in the hallway with Physical Therapy this morning and is now \"just pooped.\"  She is much relieved though, because the physical therapist \"worked out that pain in my back\"   Pt fairly sleepy and dozes off, seen again after lunch and she is awake and much more conversant.   Reports she did not sleep well last night and now feeling better after a nap.   Also notes several weeks of worsening fatigue and weakness, had to pay a neighbor to wash her dishes at home.       Past Medical History:   Diagnosis Date    Aortic valve sclerosis     heart murmur, no AS    Arrhythmia     PAT, PVC    Aspirin allergy     Plavix use long term    Asthma     CKD (chronic kidney disease) stage 3, GFR 30-59 ml/min (H)     x 2007 atleast    Congestive heart failure, unspecified     Depression     Diabetes mellitus (H) 2010    Diastolic dysfunction, left ventricle 2013    grade 2, nl ef    HTN (hypertension)     Lactic acidosis 08/2018    due to dehydration and metformin    Migraine headache     Mitral stenosis     mild, 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    Myocardial infarction (H) 9/2007, cath 2013 ml    BMS: stent " to OM, diag, nl EF, echo /C angia 2013 , f/u cath no lesion >40%    Nephrolithiasis     right side    OA (osteoarthritis) of knee     Obesity     Rheumatoid arthritis flare (H)     prednisone    Sleep apnea     restarted using cpap     TIA (transient ischaemic attack)     Ventral hernia, unspecified, without mention of obstruction or gangrene        Past Surgical History:   Procedure Laterality Date    APPENDECTOMY      BIOPSY BREAST      x2 -needle & lumpectomy-benign    CHOLECYSTECTOMY      CORONARY ANGIOGRAPHY ADULT ORDER  2007    Bare metal stent to OM1, Diagonal patent     CORONARY ANGIOGRAPHY ADULT ORDER  2007    [place] stent to Diagonal    HC LEFT HEART CATHETERIZATION  2013    Moderate CAD    HYSTERECTOMY TOTAL ABDOMINAL      ORTHOPEDIC SURGERY      knee replacement on right side (), Left side ()    RELEASE CARPAL TUNNEL      right and left    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       Family History   Problem Relation Age of Onset    Neurologic Disorder Mother         MS - at 60's    C.A.D. Father          at 8o's, ? prostate ca    Breast Cancer No family hx of     Cancer - colorectal No family hx of        Social History     Tobacco Use    Smoking status: Former     Packs/day: 0.25     Years: 1.00     Additional pack years: 0.00     Total pack years: 0.25     Types: Cigarettes     Start date:      Quit date: 1973     Years since quittin.6    Smokeless tobacco: Never   Substance Use Topics    Alcohol use: Yes     Alcohol/week: 0.0 - 1.0 standard drinks of alcohol     Comment: rarely      SH: Lives alone, [place] apartment in [place]   Has a daughter Sophia     Review Of Systems  Skin: negative   Eyes: impaired vision, glasses  Ears/Nose/Throat: hearing loss  Respiratory: + dyspnea on exertion,  Cardiovascular: exercise intolerance  Gastrointestinal: poor appetite, not eating as well as usual     Genitourinary: as above  Musculoskeletal: assist  "of one person for transfers out of bed, SBA for dressing, min assist for bathing   Ambulatory with walker and physical therapist assist       Neurologic: negative  Psychiatric: anxiety and depression   Hematologic/Lymphatic/Immunologic: negative  Endocrine: negative      GENERAL APPEARANCE: alert and no distress, very fatigued  BP (!) 170/98   Pulse 76   Temp 97.4  F (36.3  C)   Resp 18   Ht 1.702 m (5' 7\")   Wt 127.8 kg (281 lb 12.8 oz)   SpO2 92%   BMI 44.14 kg/m     HEENT: normocephalic, no lesion or abnormalities  NECK: no adenopathy, no asymmetry, masses, or scars and thyroid normal to palpation  RESP: lungs clear to auscultation - no rales, rhonchi or wheezes  CV: regular rate and rhythm, normal S1 S2  ABDOMEN: obese, soft, nontender, no HSM or masses and bowel sounds normal  MS: extremities normal- no extremity edema   SKIN: no suspicious lesions or rashes  NEURO: Normal strength and tone, sensory exam grossly normal, and speech normal  PSYCH: affect okay, pleasant   LYMPHATICS: No cervical,  or supraclavicular nodes     Lab Results   Component Value Date     11/28/2023    POTASSIUM 5.1 11/28/2023    CHLORIDE 105 11/28/2023    CO2 19 (L) 11/28/2023    ANIONGAP 12 11/28/2023     (H) 11/29/2023    BUN 29.0 (H) 11/28/2023    CR 1.59 (H) 11/28/2023    GFRESTIMATED 31 (L) 11/28/2023    TELLY 8.6 11/28/2023     Lab Results   Component Value Date    AST 15 11/25/2023      ALBUMIN 4.2 11/25/2023      ALKPHOS 83 11/25/2023     Lab Results   Component Value Date    WBC 10.1 11/27/2023      HGB 12.1 11/27/2023      MCV 87 11/27/2023       11/27/2023     TSH   Date Value Ref Range Status   10/11/2023 1.65 0.30 - 4.20 uIU/mL Final   10/24/2022 1.98 0.40 - 4.00 mU/L Final   08/24/2020 1.39 0.40 - 4.00 mU/L Final      ECHO 10/12/2023   1. The left ventricle is normal in structure, function and size. The visual ejection fraction is estimated at 55%.  2. The right ventricle is normal in structure, " function and size.  3. There is moderate mitral stenosis. The mean mitral valve gradient is 6mmHg.  4. Moderate valvular aortic stenosis. Mean 20mmHg, Vmax 2.7m/s, MEL 1.2cm2, DI 0.40.  Echo 11/2020 showed EF 60%     CT ABDOMEN PELVIS W/O CONTRAST  11/26/2023  LOWER CHEST: Mild scattered atelectasis. No consolidation or pleural effusion.  KIDNEYS/BLADDER: Multiple bilateral renal cysts measuring up to 4.8 cm in the right kidney appears benign and do not require follow-up. A 1 x 0.7 cm stone is seen in the right renal pelvis with marked adjacent fat stranding. No right hydronephrosis. A 1   mm nonobstructing stones also seen in the right kidney. No left hydroureteronephrosis. Bladder is decompressed and grossly unremarkable.  BOWEL: Colonic diverticulosis without acute diverticulitis. No focal bowel thickening or obstruction.  VASCULATURE: Mild aortoiliac atherosclerotic calcifications. No abdominal aortic aneurysm.  PELVIC ORGANS: Status post hysterectomy. No abnormal adnexal masses.  MUSCULOSKELETAL: Multilevel mild and moderate degenerative disc space narrowing with vacuum disc in the lower thoracic and lumbar spine. No suspicious osseous lesions or acute fractures. A large ventral abdominal hernia is present containing   nonobstructed colon and small bowel                                                             IMPRESSION:   1.  1 cm stone in the right renal pelvis with marked adjacent fat stranding indicating superimposed infection. Recommend correlation with urinalysis. No hydronephrosis.  2.  Additional 1 mm nonobstructing stone in the right kidney.  3.  Large ventral abdominal hernia containing nonobstructed colon and small bowel.       IMP/PLAN:   (N20.0) Nephrolithiasis  (primary encounter diagnosis)  (N12) Pyelonephritis  Comment: with sepsis, pansensitive E coli UTI    Plan: finish course of po Ceftin, monitor for any recurrent symptoms  Urology outpatient follow up to address stones        (I48.91)  Atrial fibrillation with rapid ventricular response (H)  Comment:   Pulse Readings from Last 4 Encounters:   11/30/23 76   11/29/23 76   11/16/23 117   10/26/23 68      Plan: metoprolol 25 mg bid for VR control   Apixaban 2.5 mg bid for stroke prophylaxis   Cardiology follow as scheduled on 12/15     (I10) Essential hypertension   (N18.32) Stage 3b chronic kidney disease (H)  Comment:   BP Readings from Last 3 Encounters:   11/30/23 (!) 170/98   11/29/23 126/78   11/16/23 125/79      Plan: metoprolol 25 mg bid     (I50.33) Acute on chronic heart failure with preserved ejection fraction (H)  (I25.10) Coronary artery disease involving native coronary artery of native heart without angina pectoris  Comment: recent hospitalization requiring IV diuresis    Plan: atorvastatin 20 mg/day and clopidogrel 75 mg/day for secondary prevention  Furosemide 20 mg/day - monitor weights and exam   NTG prn    (M62.830) Back muscle spasm  Comment: she reports this is much improved after Physical Therapist worked with her today   Plan: discharged from the hospital on PRN Flexeril and oxycodone>>> would stop those prior to her going home    Continue acetaminophen 1000 mg bid and PRN    (G47.33) ELIGIO on CPAP  (E66.2) Obesity hypoventilation syndrome (H)  Plan: home settings     (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   She is also on Flonase NS and fexofenadine 180 mg/HS, would change to PRN     (F41.9) Anxiety  Comment: and depression, by history   Plan: remains on PTA duloxetine 60 mg/day and bupropion 100 mg/day     (R53.81) Physical deconditioning  Comment: after acute illnesses   Plan: PHYSICAL THERAPY / OCCUPATIONAL THERAPY for strengthening, balance,  gait, ADLs.   Discharge goal is transition to AL given higher care needs       Halima Mayorga MD

## 2023-11-30 NOTE — TELEPHONE ENCOUNTER
----- Message from Shira Wu PA-C sent at 2023 10:06 AM CST -----  Regardincm stone, HFU  Pt needs follow-up with BD in 1-2 months, 1cm stone, HFU

## 2023-11-30 NOTE — LETTER
[facility]   To:   [facility]          Please give to facility    From:   Maria L Mckeon  [facility]  Care Coordinator   [facility]   P: [phone]   [email or web address]   Patient Name:  Moira Andre YOB: 1933   Admit date: 11/29/23      *Information Needed:  Please contact me when the patient will discharge (or if they will move to long term care)- include the discharge date, disposition, and main diagnosis   If the patient is discharged with home care services, please provide the name of the agency    Also- Please inform me if a care conference is being held.   Phone, Fax or Email with information                              Thank you    Maria L Mckeon  [facility]  Clinic Care Coordinator  [facility]  [phone]  [email or web address]                     Report received from Jocelynn, [facility]. 78

## 2023-11-30 NOTE — LETTER
"    11/30/2023        RE: Moira Andre  [address]  [place] [id number]        Moira Andre is a 89 year old female seen November 30, 2023 at [facility] TCU where she was admitted after [facility] hospitalization 11/25-29 for right kidney stone, right pyelonephritis and sepsis.   UC +for a pansensitive E coli.    She was also found to be in atrial fib with RVR, needed IVF for hypotension and was treated with ceftriaxone /Ceftin.  Right flank pain thought to be musculoskeletal and workup of nephrolithiasis deferred to outpatient follow up    Pt improved and discharged to TCU for ongoing recovery and Rehab.     Pt had a prior [facility] hospitalization in October with new dx of atrial fib, with pulmonary edema and hypoxic respiratory failure due to CHF exacerbation.  She was started on furosemide, low dose metoprolol and apixaban at that time      Today pt is seen in her room lying abed.   Had a shower and walked in the hallway with Physical Therapy this morning and is now \"just pooped.\"  She is much relieved though, because the physical therapist \"worked out that pain in my back\"   Pt fairly sleepy and dozes off, seen again after lunch and she is awake and much more conversant.   Reports she did not sleep well last night and now feeling better after a nap.   Also notes several weeks of worsening fatigue and weakness, had to pay a neighbor to wash her dishes at home.       Past Medical History:   Diagnosis Date     Aortic valve sclerosis     heart murmur, no AS     Arrhythmia     PAT, PVC     Aspirin allergy     Plavix use long term     Asthma      CKD (chronic kidney disease) stage 3, GFR 30-59 ml/min (H)     x 2007 atleast     Congestive heart failure, unspecified      Depression      Diabetes mellitus (H) 2010     Diastolic dysfunction, left ventricle 2013    grade 2, nl ef     HTN (hypertension)      Lactic acidosis 08/2018    due to dehydration and metformin     Migraine headache  "     Mitral stenosis     mild, 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     Myocardial infarction (H) 2007, cath 2013 ml    BMS: stent to OM, diag, nl EF, echo /C angia 2013 , f/u cath no lesion >40%     Nephrolithiasis     right side     OA (osteoarthritis) of knee      Obesity      Rheumatoid arthritis flare (H)     prednisone     Sleep apnea     restarted using cpap      TIA (transient ischaemic attack)      Ventral hernia, unspecified, without mention of obstruction or gangrene        Past Surgical History:   Procedure Laterality Date     APPENDECTOMY       BIOPSY BREAST      x2 -needle & lumpectomy-benign     CHOLECYSTECTOMY       CORONARY ANGIOGRAPHY ADULT ORDER  2007    Bare metal stent to OM1, Diagonal patent      CORONARY ANGIOGRAPHY ADULT ORDER  2007    [place] stent to Diagonal     HC LEFT HEART CATHETERIZATION  2013    Moderate CAD     HYSTERECTOMY TOTAL ABDOMINAL       ORTHOPEDIC SURGERY      knee replacement on right side (), Left side ()     RELEASE CARPAL TUNNEL      right and left     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       Family History   Problem Relation Age of Onset     Neurologic Disorder Mother         MS - at 60's     C.A.D. Father          at 8o's, ? prostate ca     Breast Cancer No family hx of      Cancer - colorectal No family hx of        Social History     Tobacco Use     Smoking status: Former     Packs/day: 0.25     Years: 1.00     Additional pack years: 0.00     Total pack years: 0.25     Types: Cigarettes     Start date:      Quit date: 1973     Years since quittin.6     Smokeless tobacco: Never   Substance Use Topics     Alcohol use: Yes     Alcohol/week: 0.0 - 1.0 standard drinks of alcohol     Comment: rarely      SH: Lives alone, [place] apartment in [place]   Has a daughter Sophia     Review Of Systems  Skin: negative   Eyes: impaired vision, glasses  Ears/Nose/Throat: hearing loss  Respiratory: + dyspnea on exertion,  Cardiovascular:  "exercise intolerance  Gastrointestinal: poor appetite, not eating as well as usual     Genitourinary: as above  Musculoskeletal: assist of one person for transfers out of bed, SBA for dressing, min assist for bathing   Ambulatory with walker and physical therapist assist       Neurologic: negative  Psychiatric: anxiety and depression   Hematologic/Lymphatic/Immunologic: negative  Endocrine: negative      GENERAL APPEARANCE: alert and no distress, very fatigued  BP (!) 170/98   Pulse 76   Temp 97.4  F (36.3  C)   Resp 18   Ht 1.702 m (5' 7\")   Wt 127.8 kg (281 lb 12.8 oz)   SpO2 92%   BMI 44.14 kg/m     HEENT: normocephalic, no lesion or abnormalities  NECK: no adenopathy, no asymmetry, masses, or scars and thyroid normal to palpation  RESP: lungs clear to auscultation - no rales, rhonchi or wheezes  CV: regular rate and rhythm, normal S1 S2  ABDOMEN: obese, soft, nontender, no HSM or masses and bowel sounds normal  MS: extremities normal- no extremity edema   SKIN: no suspicious lesions or rashes  NEURO: Normal strength and tone, sensory exam grossly normal, and speech normal  PSYCH: affect okay, pleasant   LYMPHATICS: No cervical,  or supraclavicular nodes     Lab Results   Component Value Date     11/28/2023    POTASSIUM 5.1 11/28/2023    CHLORIDE 105 11/28/2023    CO2 19 (L) 11/28/2023    ANIONGAP 12 11/28/2023     (H) 11/29/2023    BUN 29.0 (H) 11/28/2023    CR 1.59 (H) 11/28/2023    GFRESTIMATED 31 (L) 11/28/2023    TELLY 8.6 11/28/2023     Lab Results   Component Value Date    AST 15 11/25/2023      ALBUMIN 4.2 11/25/2023      ALKPHOS 83 11/25/2023     Lab Results   Component Value Date    WBC 10.1 11/27/2023      HGB 12.1 11/27/2023      MCV 87 11/27/2023       11/27/2023     TSH   Date Value Ref Range Status   10/11/2023 1.65 0.30 - 4.20 uIU/mL Final   10/24/2022 1.98 0.40 - 4.00 mU/L Final   08/24/2020 1.39 0.40 - 4.00 mU/L Final      ECHO 10/12/2023   1. The left ventricle is " normal in structure, function and size. The visual ejection fraction is estimated at 55%.  2. The right ventricle is normal in structure, function and size.  3. There is moderate mitral stenosis. The mean mitral valve gradient is 6mmHg.  4. Moderate valvular aortic stenosis. Mean 20mmHg, Vmax 2.7m/s, MEL 1.2cm2, DI 0.40.  Echo 11/2020 showed EF 60%     CT ABDOMEN PELVIS W/O CONTRAST  11/26/2023  LOWER CHEST: Mild scattered atelectasis. No consolidation or pleural effusion.  KIDNEYS/BLADDER: Multiple bilateral renal cysts measuring up to 4.8 cm in the right kidney appears benign and do not require follow-up. A 1 x 0.7 cm stone is seen in the right renal pelvis with marked adjacent fat stranding. No right hydronephrosis. A 1   mm nonobstructing stones also seen in the right kidney. No left hydroureteronephrosis. Bladder is decompressed and grossly unremarkable.  BOWEL: Colonic diverticulosis without acute diverticulitis. No focal bowel thickening or obstruction.  VASCULATURE: Mild aortoiliac atherosclerotic calcifications. No abdominal aortic aneurysm.  PELVIC ORGANS: Status post hysterectomy. No abnormal adnexal masses.  MUSCULOSKELETAL: Multilevel mild and moderate degenerative disc space narrowing with vacuum disc in the lower thoracic and lumbar spine. No suspicious osseous lesions or acute fractures. A large ventral abdominal hernia is present containing   nonobstructed colon and small bowel                                                             IMPRESSION:   1.  1 cm stone in the right renal pelvis with marked adjacent fat stranding indicating superimposed infection. Recommend correlation with urinalysis. No hydronephrosis.  2.  Additional 1 mm nonobstructing stone in the right kidney.  3.  Large ventral abdominal hernia containing nonobstructed colon and small bowel.       IMP/PLAN:   (N20.0) Nephrolithiasis  (primary encounter diagnosis)  (N12) Pyelonephritis  Comment: with sepsis, pansensitive E coli UTI     Plan: finish course of po Ceftin, monitor for any recurrent symptoms  Urology outpatient follow up to address stones        (I48.91) Atrial fibrillation with rapid ventricular response (H)  Comment:   Pulse Readings from Last 4 Encounters:   11/30/23 76   11/29/23 76   11/16/23 117   10/26/23 68      Plan: metoprolol 25 mg bid for VR control   Apixaban 2.5 mg bid for stroke prophylaxis   Cardiology follow as scheduled on 12/15     (I10) Essential hypertension   (N18.32) Stage 3b chronic kidney disease (H)  Comment:   BP Readings from Last 3 Encounters:   11/30/23 (!) 170/98   11/29/23 126/78   11/16/23 125/79      Plan: metoprolol 25 mg bid     (I50.33) Acute on chronic heart failure with preserved ejection fraction (H)  (I25.10) Coronary artery disease involving native coronary artery of native heart without angina pectoris  Comment: recent hospitalization requiring IV diuresis    Plan: atorvastatin 20 mg/day and clopidogrel 75 mg/day for secondary prevention  Furosemide 20 mg/day - monitor weights and exam   NTG prn    (M62.830) Back muscle spasm  Comment: she reports this is much improved after Physical Therapist worked with her today   Plan: discharged from the hospital on PRN Flexeril and oxycodone>>> would stop those prior to her going home    Continue acetaminophen 1000 mg bid and PRN    (G47.33) ELIGIO on CPAP  (E66.2) Obesity hypoventilation syndrome (H)  Plan: home settings     (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   She is also on Flonase NS and fexofenadine 180 mg/HS, would change to PRN     (F41.9) Anxiety  Comment: and depression, by history   Plan: remains on PTA duloxetine 60 mg/day and bupropion 100 mg/day      (R53.81) Physical deconditioning  Comment: after acute illnesses   Plan: PHYSICAL THERAPY / OCCUPATIONAL THERAPY for strengthening, balance, gait, ADLs.   Discharge goal is transition to AL given higher care needs       Halima Mayorga MD       Sincerely,        Halima Mayorga MD

## 2023-12-04 ENCOUNTER — DISCHARGE SUMMARY NURSING HOME (OUTPATIENT)
Dept: GERIATRICS | Facility: CLINIC | Age: 88
End: 2023-12-04
Payer: COMMERCIAL

## 2023-12-04 VITALS
HEIGHT: 67 IN | OXYGEN SATURATION: 93 % | DIASTOLIC BLOOD PRESSURE: 72 MMHG | RESPIRATION RATE: 18 BRPM | TEMPERATURE: 97.6 F | SYSTOLIC BLOOD PRESSURE: 134 MMHG | WEIGHT: 277.3 LBS | BODY MASS INDEX: 43.52 KG/M2 | HEART RATE: 78 BPM

## 2023-12-04 DIAGNOSIS — I48.91 ATRIAL FIBRILLATION WITH RAPID VENTRICULAR RESPONSE (H): ICD-10-CM

## 2023-12-04 DIAGNOSIS — I25.10 CORONARY ARTERY DISEASE INVOLVING NATIVE CORONARY ARTERY OF NATIVE HEART WITHOUT ANGINA PECTORIS: ICD-10-CM

## 2023-12-04 DIAGNOSIS — E11.21 TYPE 2 DIABETES MELLITUS WITH DIABETIC NEPHROPATHY, WITHOUT LONG-TERM CURRENT USE OF INSULIN (H): ICD-10-CM

## 2023-12-04 DIAGNOSIS — N12 PYELONEPHRITIS: ICD-10-CM

## 2023-12-04 DIAGNOSIS — N20.0 NEPHROLITHIASIS: Primary | ICD-10-CM

## 2023-12-04 DIAGNOSIS — M62.830 BACK MUSCLE SPASM: ICD-10-CM

## 2023-12-04 DIAGNOSIS — N18.32 STAGE 3B CHRONIC KIDNEY DISEASE (H): ICD-10-CM

## 2023-12-04 DIAGNOSIS — R53.81 PHYSICAL DECONDITIONING: ICD-10-CM

## 2023-12-04 DIAGNOSIS — E66.2 OBESITY HYPOVENTILATION SYNDROME (H): ICD-10-CM

## 2023-12-04 DIAGNOSIS — J45.20 MILD INTERMITTENT ASTHMA WITHOUT COMPLICATION: ICD-10-CM

## 2023-12-04 DIAGNOSIS — I50.33 ACUTE ON CHRONIC HEART FAILURE WITH PRESERVED EJECTION FRACTION (H): ICD-10-CM

## 2023-12-04 DIAGNOSIS — F41.9 ANXIETY: ICD-10-CM

## 2023-12-04 DIAGNOSIS — G47.33 OSA ON CPAP: ICD-10-CM

## 2023-12-04 DIAGNOSIS — I10 ESSENTIAL HYPERTENSION: ICD-10-CM

## 2023-12-04 PROCEDURE — 99316 NF DSCHRG MGMT 30 MIN+: CPT | Performed by: NURSE PRACTITIONER

## 2023-12-04 RX ORDER — LIDOCAINE 4 G/G
1 PATCH TOPICAL EVERY 24 HOURS
COMMUNITY
End: 2023-12-04

## 2023-12-04 NOTE — PROGRESS NOTES
"[facility] DISCHARGE SUMMARY    PATIENT'S NAME: Moira Andre  YOB: 1933    PRIMARY CARE PROVIDER AND CLINIC RESPONSIBLE AFTER TRANSFER: Maryan Churchill     CODE STATUS: Full Code    TRANSFERRING PROVIDERS: YOHANNES Piña CNP, Dr. Halima Mayorga MD      DATE OF SNF ADMISSION:  November / 29 / 2023.    DATE OF SNF DISCHARGE (including anticipating DC): December / 05 / 2023.    DISCHARGE DISPOSITION: FMG Provider    Nursing Facility: [facility] stay 11/25/23 to 11/29/23.     Condition on Discharge:  Stable.    Function:  Ambulating: TBA Transfers: TBA  Cognitive Scores:  TBA     Physical Function:  TBA  Equipment: walker  DME: Walker    DISCHARGE DIAGNOSIS:      Nephrolithiasis  Pyelonephritis  Atrial fibrillation with rapid ventricular response (H)  Essential hypertension  Stage 3b chronic kidney disease (H)  Acute on chronic heart failure with preserved ejection fraction (H)  Coronary artery disease involving native coronary artery of native heart without angina pectoris  Back muscle spasm  ELIGIO on CPAP  Obesity hypoventilation syndrome (H)  Type 2 diabetes mellitus with diabetic nephropathy, without long-term current use of insulin (H)  Mild intermittent asthma without complication  Anxiety  Physical deconditioning        HOSPITAL COURSE: per  Dr Mayorga's admit note and quoting : \"Moira Andre is a 89 year old female seen November 30, 2023 at [facility] TCU where she was admitted after [facility] hospitalization 11/25-29 for right kidney stone, right pyelonephritis and sepsis.   UC +for a pan sensitive E coli.    She was also found to be in atrial fib with RVR, needed IVF for hypotension and was treated with ceftriaxone /Ceftin.  Right flank pain thought to be musculoskeletal and workup of nephrolithiasis deferred to outpatient follow up    Pt improved and discharged to TCU for ongoing " "recovery and Rehab.     Pt had a prior [facility] hospitalization in October with new dx of atrial fib, with pulmonary edema and hypoxic respiratory failure due to CHF exacerbation.  She was started on furosemide, low dose metoprolol and apixaban at that jesse\"      TCU/SNF COURSE: has met goals of PT OT, no further pain, says does not remember that she was so sick. 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    Myocardial infarction (H) 9/2007, cath 2013 ml    BMS: stent to OM, diag, nl EF, echo /C angia 2013 , f/u cath no lesion >40%    Nephrolithiasis     right side    OA (osteoarthritis) of knee     Obesity     Rheumatoid arthritis flare (H)     prednisone    Sleep apnea     restarted using cpap 2017    TIA (transient ischaemic attack)     Ventral hernia, unspecified, without mention of obstruction or gangrene        DISCHARGE MEDICATIONS:  Current Outpatient Medications   Medication Sig Dispense Refill    acetaminophen (TYLENOL) 500 MG tablet Take 1,000 mg by mouth 2 times daily      albuterol (PROAIR HFA/PROVENTIL HFA/VENTOLIN HFA) 108 (90 Base) MCG/ACT inhaler Inhale 2 puffs into the lungs every 6 hours as needed for shortness of breath, wheezing or cough For shortness of breath      apixaban ANTICOAGULANT (ELIQUIS) 2.5 MG tablet Take 1 tablet (2.5 mg) by mouth 2 times daily 180 tablet 1    atorvastatin (LIPITOR) 20 MG tablet TAKE 1 TABLET BY MOUTH DAILY FOR " CHOLESTEROL 100 tablet 2    buPROPion (WELLBUTRIN SR) 100 MG 12 hr tablet Take 1 tablet (100 mg) by mouth daily for mood 90 tablet 3    calcium carbonate (TUMS) 500 MG chewable tablet Take 1 chew tab by mouth 2 times daily as needed for heartburn      camphor-menthol-methyl salicylate 3.1-6-10 % PTCH Apply 1 patch topically as needed (hand pain) Or back pain      clopidogrel (PLAVIX) 75 MG tablet TAKE 1 TABLET BY MOUTH DAILY 100 tablet 1    DULoxetine (CYMBALTA) 60 MG capsule Take 1 capsule (60 mg) by mouth daily 90 capsule 3    fexofenadine (ALLEGRA) 180 MG tablet Take 180 mg by mouth every evening      fluticasone (FLONASE) 50 MCG/ACT nasal spray Spray 1 spray into both nostrils daily as needed       fluticasone-salmeterol (WIXELA INHUB) 100-50 MCG/ACT inhaler USE 1 INHALATION BY MOUTH EVERY  12 HOURS (Patient taking differently: Inhale 1 puff into the lungs daily) 180 each 3    furosemide (LASIX) 20 MG tablet Take 1 tablet (20 mg) by mouth daily 90 tablet 1    glimepiride (AMARYL) 2 MG tablet Take 1 tablet (2 mg) by mouth 2 times daily (before meals) 200 tablet 2    Menthol (Topical Analgesic) 7.5 % (Roll) MISC Apply to affected area every morning      metoprolol tartrate (LOPRESSOR) 25 MG tablet Take 1 tablet (25 mg) by mouth 2 times daily      miconazole (MICATIN) 2 % external powder Apply topically 2 times daily for 5 days      Multiple Vitamins-Minerals (HAIR SKIN AND NAILS FORMULA PO) Take 1 tablet by mouth daily      nitroGLYcerin (NITROSTAT) 0.4 MG sublingual tablet FOR CHEST PAIN PLACE 1 TABLET  UNDER TONGUE EVERY 5 MINUTES FOR 3 DOSES. IF SYMPTOMS PERSIST 5  MINUTES AFTER 1ST DOSE CALL 911 25 tablet 0    pantoprazole (PROTONIX) 20 MG EC tablet Take 1 tablet (20 mg) by mouth daily 90 tablet 3    Vitamin D3 (CHOLECALCIFEROL) 25 mcg (1000 units) tablet Take 25 mcg by mouth daily       MED REC REQUIRED   Post Medication Reconciliation Status: discharge medications reconciled and changed, per note/orders  "    MEDICATION CHANGES/RATIONALE:   See discharge orders    /72   Pulse 78   Temp 97.6  F (36.4  C)   Resp 18   Ht 1.702 m (5' 7\")   Wt 125.8 kg (277 lb 4.8 oz)   SpO2 93%   BMI 43.43 kg/m      TODAY DURING EXAM/ROS:  No CP, SOB, Cough, dizziness, fevers, chills, HA, N/V, dysuria or Bowel Abnormalities. Appetite is good.  No pain      PHYSICAL EXAM Today:  A & O x 3, NAD. Lungs CTA, non labored. RRR, S1/S2 w/o murmur,gallop or rub.  no edema.  Abdomen soft, nontender, +BT'S. No focal neurological deficits. MEREDITH and up with walker.  .       SNF LABS  Recent Labs   Lab Test 11/29/23  0901 11/29/23  0606 11/28/23  0637 11/27/23  1410   NA  --   --  136 136   POTASSIUM  --   --  5.1 5.1   CHLORIDE  --   --  105 106   CO2  --   --  19* 21*   ANIONGAP  --   --  12 9   * 143* 148* 121*   BUN  --   --  29.0* 30.2*   CR  --   --  1.59* 1.62*   TELLY  --   --  8.6 8.8    < > = values in this interval not displayed.     Hemoglobin   Date Value Ref Range Status   11/27/2023 12.1 11.7 - 15.7 g/dL Final   11/26/2023 11.8 11.7 - 15.7 g/dL Final   02/18/2021 11.9 11.7 - 15.7 g/dL Final   02/17/2021 13.1 11.7 - 15.7 g/dL Final             DISCHARGE PLAN:  Occupational Therapy, Physical Therapy, Home Health Aide, and From:  [facility].  Follow-up with PCP in 7 days: 7 days.    Current [place] or other scheduled appointments:  Future Appointments   Date Time Provider Department Center   12/6/2023 10:00 AM [facility]   12/15/2023  3:00 PM Gricel Sloan APRN CNP [facility]   2/22/2024 11:30 AM Maryan Churchill MD [facility]    3/15/2024  1:45 PM Jozef Sinha MD [facility]        MTM referral needed and placed by this provider: none    Pending labs: none     Discharge Treatments:none       TOTAL DISCHARGE TIME:   Greater than 30 minutes    YOHANNES Piña [facility]              Documentation of Face-to-Face and Certification for " Home Health Services     Patient: Moira Andre   YOB: 1933  MR Number: [id number]  Today's Date: 12/4/2023    I certify that patient: Moira Andre is under my care and that I, or a nurse practitioner or physician's assistant working with me, had a face-to-face encounter that meets the physician face-to-face encounter requirements with this patient on: 12/4/2023.    This encounter with the patient was in whole, or in part, for the following medical condition, which is the primary reason for home health care:      Nephrolithiasis  Pyelonephritis  Atrial fibrillation with rapid ventricular response (H)  Essential hypertension  Stage 3b chronic kidney disease (H)  Acute on chronic heart failure with preserved ejection fraction (H)  Coronary artery disease involving native coronary artery of native heart without angina pectoris  Back muscle spasm  ELIGIO on CPAP  Obesity hypoventilation syndrome (H)  Type 2 diabetes mellitus with diabetic nephropathy, without long-term current use of insulin (H)  Mild intermittent asthma without complication  Anxiety  Physical deconditioning  .    I certify that, based on my findings, the following services are medically necessary home health services: Occupational Therapy, Physical Therapy, and hha .    My clinical findings support the need for the above services because: Occupational Therapy Services are needed to assess and treat cognitive ability and address ADL safety due to impairment in deconditioning and MCI,. and Physical Therapy Services are needed to assess and treat the following functional impairments: deconditioning, safety eval.    Further, I certify that my clinical findings support that this patient is homebound (i.e. absences from home require considerable and taxing effort and are for medical reasons or Sabianist services or infrequently or of short duration when for other reasons) because: Requires assistance of another person or specialized equipment  --walker--to access medical services because patient: pt using 4 ww and walks 50 feet with supervision and needs supervision for transfers    Based on the above findings. I certify that this patient is confined to the home and needs intermittent skilled nursing care, physical therapy and/or speech therapy.  The patient is under my care, and I have initiated the establishment of the plan of care.  This patient will be followed by a physician who will periodically review the plan of care.  Physician/Provider to provide follow up care: Maryan Churchill    Responsible Medicare certified PECOS Physician: Maddison Parker RN CNP  Physician Signature: See electronic signature associated with these discharge orders.  Date: 12/4/2023

## 2023-12-04 NOTE — PATIENT INSTRUCTIONS
[facility] Discharge Orders    Name: Moira Andre  : 1933  Planned Discharge Date: 23  Discharged to: previous assisted living  [facility]    MEDICAL FOLLOW UP  Follow up with PCP in 1-2 weeks pt to make appt  Follow up with Specialists see below and list in discharge summary  Next 5 appointments (look out 90 days)      2024 11:30 AM  (Arrive by 11:10 AM)  Provider Visit with Maryan Churchill MD  [facility] ([facility] [address], [address]  [place] [address]  [phone]            FUTURE LABS: No labs orders/due    ORDER CHANGES:  Discontinue oxycodone, Zanaflex, triamcinolone, lidocaine .     DISCHARGE MEDICATIONS:  The patient s pharmacy is authorized to dispense a 30-day supply of medications. Refill requests should be directed to the primary provider, Maryan Churchill .   No narcotics are prescribed at time of discharge.   Current Outpatient Medications   Medication Sig Dispense Refill    acetaminophen (TYLENOL) 500 MG tablet Take 1,000 mg by mouth 2 times daily      albuterol (PROAIR HFA/PROVENTIL HFA/VENTOLIN HFA) 108 (90 Base) MCG/ACT inhaler Inhale 2 puffs into the lungs every 6 hours as needed for shortness of breath, wheezing or cough For shortness of breath      apixaban ANTICOAGULANT (ELIQUIS) 2.5 MG tablet Take 1 tablet (2.5 mg) by mouth 2 times daily 180 tablet 1    atorvastatin (LIPITOR) 20 MG tablet TAKE 1 TABLET BY MOUTH DAILY FOR CHOLESTEROL 100 tablet 2    buPROPion (WELLBUTRIN SR) 100 MG 12 hr tablet Take 1 tablet (100 mg) by mouth daily for mood 90 tablet 3    calcium carbonate (TUMS) 500 MG chewable tablet Take 1 chew tab by mouth 2 times daily as needed for heartburn      camphor-menthol-methyl salicylate 3.1-6-10 % PTCH Apply 1 patch topically as needed (hand pain) Or back pain      clopidogrel (PLAVIX) 75 MG tablet TAKE 1 TABLET BY MOUTH DAILY 100 tablet 1    DULoxetine (CYMBALTA)  60 MG capsule Take 1 capsule (60 mg) by mouth daily 90 capsule 3    fexofenadine (ALLEGRA) 180 MG tablet Take 180 mg by mouth every evening      fluticasone (FLONASE) 50 MCG/ACT nasal spray Spray 1 spray into both nostrils daily as needed       fluticasone-salmeterol (WIXELA INHUB) 100-50 MCG/ACT inhaler USE 1 INHALATION BY MOUTH EVERY  12 HOURS (Patient taking differently: Inhale 1 puff into the lungs daily) 180 each 3    furosemide (LASIX) 20 MG tablet Take 1 tablet (20 mg) by mouth daily 90 tablet 1    glimepiride (AMARYL) 2 MG tablet Take 1 tablet (2 mg) by mouth 2 times daily (before meals) 200 tablet 2    Menthol (Topical Analgesic) 7.5 % (Roll) MISC Apply to affected area every morning      metoprolol tartrate (LOPRESSOR) 25 MG tablet Take 1 tablet (25 mg) by mouth 2 times daily      miconazole (MICATIN) 2 % external powder Apply topically 2 times daily for 5 days      Multiple Vitamins-Minerals (HAIR SKIN AND NAILS FORMULA PO) Take 1 tablet by mouth daily      nitroGLYcerin (NITROSTAT) 0.4 MG sublingual tablet FOR CHEST PAIN PLACE 1 TABLET  UNDER TONGUE EVERY 5 MINUTES FOR 3 DOSES. IF SYMPTOMS PERSIST 5  MINUTES AFTER 1ST DOSE CALL 911 25 tablet 0    pantoprazole (PROTONIX) 20 MG EC tablet Take 1 tablet (20 mg) by mouth daily 90 tablet 3    Vitamin D3 (CHOLECALCIFEROL) 25 mcg (1000 units) tablet Take 25 mcg by mouth daily         SERVICES:  Home Care:  Occupational Therapy, Physical Therapy, Home Health Aide, and From:  [facility]    ADDITIONAL INSTRUCTIONS:  None    YOHANNES Piña CNP  This document was electronically signed on December 4, 2023

## 2023-12-06 ENCOUNTER — MEDICAL CORRESPONDENCE (OUTPATIENT)
Dept: HEALTH INFORMATION MANAGEMENT | Facility: CLINIC | Age: 88
End: 2023-12-06

## 2023-12-11 DIAGNOSIS — Z53.9 DIAGNOSIS NOT YET DEFINED: Primary | ICD-10-CM

## 2023-12-11 PROCEDURE — G0180 MD CERTIFICATION HHA PATIENT: HCPCS | Performed by: INTERNAL MEDICINE

## 2023-12-12 ENCOUNTER — PATIENT OUTREACH (OUTPATIENT)
Dept: CARE COORDINATION | Facility: CLINIC | Age: 88
End: 2023-12-12

## 2023-12-12 NOTE — PROGRESS NOTES
Clinic Care Coordination Contact  [facility]/Voicemail    Clinical Data: Care Coordinator Outreach    Outreach Documentation Number of Outreach Attempt   12/12/2023   4:04 PM 1       Left message on patient's voicemail with call back information and requested return call.    Plan: Care Coordinator will send care coordination introduction letter with care coordinator contact information and explanation of care coordination services via mail. Care Coordinator will try to reach patient again in 1-2 business days.    Maria L Mckeon  [facility]  Clinic Care Coordinator  [facility]  [phone]  [email or web address]

## 2023-12-12 NOTE — LETTER
[facility] CARE COORDINATION  [address]  [place] [address]    December 12, 2023    Moira Andre  [address]  [place] [address]      Dear Moira,    I am a clinic care coordinator who works with Mrayan Churchill MD with the [facility]. I wanted to introduce myself and provide you with my contact information for you to be able to call me with any questions or concerns. Below is a description of clinic care coordination and how I can further assist you.       The clinic care coordination team is made up of a registered nurse, , financial resource worker and community health worker who understand the health care system. The goal of clinic care coordination is to help you manage your health and improve access to the health care system. Our team works alongside your provider to assist you in determining your health and social needs. We can help you obtain health care and community resources, providing you with necessary information and education. We can work with you through any barriers and develop a care plan that helps coordinate and strengthen the communication between you and your care team.  Our services are voluntary and are offered without charge to you personally.    Please feel free to contact me with any questions or concerns regarding care coordination and what we can offer.      We are focused on providing you with the highest-quality healthcare experience possible.    Sincerely,     Maria L Mckeon, [facility]  Clinic Care Coordinator  [facility]  [phone]    Enclosed:

## 2023-12-13 NOTE — PROGRESS NOTES
Clinic Care Coordination Contact  [facility]    Clinical Data: Care Coordinator Outreach    Outreach Documentation Number of Outreach Attempt   12/12/2023   4:04 PM 1   12/13/2023   4:27 PM 2       [facility].      Plan: Care Coordinator will do no further outreaches at this time.    Maria L Mckeon  [facility]  Clinic Care Coordinator  [facility]  [phone]  [email or web address]

## 2023-12-19 ENCOUNTER — NURSE TRIAGE (OUTPATIENT)
Dept: NURSING | Facility: CLINIC | Age: 88
End: 2023-12-19
Payer: COMMERCIAL

## 2023-12-19 ENCOUNTER — TELEPHONE (OUTPATIENT)
Dept: PHARMACY | Facility: CLINIC | Age: 88
End: 2023-12-19

## 2023-12-19 NOTE — TELEPHONE ENCOUNTER
Triage Call:     Patient and physical therapy assistant calling. Pt gave permission to speak to the PTA.   JUAN [facility]: [phone]    Today patient has some elevated blood pressure readings:     High blood pressure readings:   177/133  155/124  155/119    Oxygen saturation: 96% on RA  Pulse: 127  Pt has not missed any doses of her medication    Disposition: Go to the office now. Pt was given care advice and is aware of the [facility]. She was transferred to [facility] to check clinic appts.     Reason for Disposition   Systolic BP >= 200 OR Diastolic >= 120 and having NO cardiac or neurologic symptoms    Additional Information   Negative: Symptom is main concern (e.g., headache, chest pain)   Negative: Sounds like a life-threatening emergency to the triager   Negative: Low blood pressure is main concern   Negative: Systolic BP >= 160 OR Diastolic >= 100, and any cardiac (e.g., breathing difficulty, chest pain) or neurologic symptoms (e.g., new-onset blurred or double vision)   Negative: Pregnant 20 or more weeks (or postpartum < 6 weeks) with new hand or face swelling   Negative: Pregnant 20 or more weeks (or postpartum < 6 weeks) and Systolic BP >= 160 OR Diastolic >= 110   Negative: Patient sounds very sick or weak to the triager    Protocols used: Blood Pressure - High-A-OH  Akosua Butler RN  [facility] Nurse Advisor 11:30 AM 12/19/2023

## 2023-12-19 NOTE — TELEPHONE ENCOUNTER
We have been unable to reach this patient for MTM follow-up after several attempts. We will stop reaching out to the patient at this time. Please let us know if we can assist in this patient's care in the future.    Routing to PCP as RYAN Morin, PharmD, [facility]  Medication Therapy Management Provider  [phone]

## 2023-12-26 ENCOUNTER — TELEPHONE (OUTPATIENT)
Dept: FAMILY MEDICINE | Facility: CLINIC | Age: 88
End: 2023-12-26
Payer: COMMERCIAL

## 2023-12-26 NOTE — TELEPHONE ENCOUNTER
Notify if SBP stays consistently high >160 or diastolic >95  Goal of Blood Pressure is below 140/90  But it is affected by so many things   If Blood Pressure is elevated then recheck again after resting for 5-10 minutes   If it is consistently high then will adjust her medications  Dr.Nasima Kerline MD

## 2023-12-26 NOTE — TELEPHONE ENCOUNTER
"Lucia, OT calling stating BP and Pulse update:    \"The first reading I got today was 149/124 with no symptoms and pulse was 150 . The middle reading was also 139/125 but then when I left it was 86 for a pulse and blood pressure was 104/86.     Please advise on parameters or what would provider like to do?    "

## 2023-12-26 NOTE — TELEPHONE ENCOUNTER
Patient Contact    Attempt # 1    Was call answered?  Yes. Writer relayed PCP's message below, PTA expressed verbal understanding.    Melodie Brizuela RN  [facility]

## 2023-12-27 ENCOUNTER — NURSE TRIAGE (OUTPATIENT)
Dept: FAMILY MEDICINE | Facility: CLINIC | Age: 88
End: 2023-12-27
Payer: COMMERCIAL

## 2023-12-27 ENCOUNTER — HOSPITAL ENCOUNTER (INPATIENT)
Facility: CLINIC | Age: 88
LOS: 4 days | Discharge: HOME HEALTH | DRG: 280 | End: 2023-12-31
Attending: EMERGENCY MEDICINE | Admitting: INTERNAL MEDICINE
Payer: COMMERCIAL

## 2023-12-27 ENCOUNTER — APPOINTMENT (OUTPATIENT)
Dept: GENERAL RADIOLOGY | Facility: CLINIC | Age: 88
DRG: 280 | End: 2023-12-27
Attending: EMERGENCY MEDICINE
Payer: COMMERCIAL

## 2023-12-27 DIAGNOSIS — N30.01 ACUTE CYSTITIS WITH HEMATURIA: ICD-10-CM

## 2023-12-27 DIAGNOSIS — I50.30 HEART FAILURE WITH PRESERVED EJECTION FRACTION, NYHA CLASS I (H): Primary | ICD-10-CM

## 2023-12-27 DIAGNOSIS — I48.91 ATRIAL FIBRILLATION WITH RVR (H): ICD-10-CM

## 2023-12-27 DIAGNOSIS — I48.91 ATRIAL FIBRILLATION WITH RAPID VENTRICULAR RESPONSE (H): ICD-10-CM

## 2023-12-27 LAB
ALBUMIN SERPL BCG-MCNC: 4 G/DL (ref 3.5–5.2)
ALP SERPL-CCNC: 85 U/L (ref 40–150)
ALT SERPL W P-5'-P-CCNC: 10 U/L (ref 0–50)
ANION GAP SERPL CALCULATED.3IONS-SCNC: 13 MMOL/L (ref 7–15)
AST SERPL W P-5'-P-CCNC: 14 U/L (ref 0–45)
ATRIAL RATE - MUSE: NORMAL BPM
BASOPHILS # BLD AUTO: 0.1 10E3/UL (ref 0–0.2)
BASOPHILS NFR BLD AUTO: 1 %
BILIRUB SERPL-MCNC: 0.4 MG/DL
BUN SERPL-MCNC: 31.9 MG/DL (ref 8–23)
CALCIUM SERPL-MCNC: 9.4 MG/DL (ref 8.2–9.6)
CHLORIDE SERPL-SCNC: 105 MMOL/L (ref 98–107)
CREAT SERPL-MCNC: 1.62 MG/DL (ref 0.51–0.95)
DEPRECATED HCO3 PLAS-SCNC: 23 MMOL/L (ref 22–29)
DIASTOLIC BLOOD PRESSURE - MUSE: NORMAL MMHG
EGFRCR SERPLBLD CKD-EPI 2021: 30 ML/MIN/1.73M2
EOSINOPHIL # BLD AUTO: 0.1 10E3/UL (ref 0–0.7)
EOSINOPHIL NFR BLD AUTO: 1 %
ERYTHROCYTE [DISTWIDTH] IN BLOOD BY AUTOMATED COUNT: 16 % (ref 10–15)
GLUCOSE SERPL-MCNC: 184 MG/DL (ref 70–99)
HCT VFR BLD AUTO: 42.8 % (ref 35–47)
HGB BLD-MCNC: 12.9 G/DL (ref 11.7–15.7)
HOLD SPECIMEN: NORMAL
HOLD SPECIMEN: NORMAL
IMM GRANULOCYTES # BLD: 0 10E3/UL
IMM GRANULOCYTES NFR BLD: 0 %
INTERPRETATION ECG - MUSE: NORMAL
LYMPHOCYTES # BLD AUTO: 1.7 10E3/UL (ref 0.8–5.3)
LYMPHOCYTES NFR BLD AUTO: 17 %
MCH RBC QN AUTO: 25.7 PG (ref 26.5–33)
MCHC RBC AUTO-ENTMCNC: 30.1 G/DL (ref 31.5–36.5)
MCV RBC AUTO: 85 FL (ref 78–100)
MONOCYTES # BLD AUTO: 0.7 10E3/UL (ref 0–1.3)
MONOCYTES NFR BLD AUTO: 7 %
NEUTROPHILS # BLD AUTO: 7.4 10E3/UL (ref 1.6–8.3)
NEUTROPHILS NFR BLD AUTO: 74 %
NRBC # BLD AUTO: 0 10E3/UL
NRBC BLD AUTO-RTO: 0 /100
NT-PROBNP SERPL-MCNC: 4160 PG/ML (ref 0–1800)
P AXIS - MUSE: NORMAL DEGREES
PLATELET # BLD AUTO: 323 10E3/UL (ref 150–450)
POTASSIUM SERPL-SCNC: 5 MMOL/L (ref 3.4–5.3)
PR INTERVAL - MUSE: NORMAL MS
PROT SERPL-MCNC: 7.5 G/DL (ref 6.4–8.3)
QRS DURATION - MUSE: 92 MS
QT - MUSE: 346 MS
QTC - MUSE: 484 MS
R AXIS - MUSE: -29 DEGREES
RBC # BLD AUTO: 5.02 10E6/UL (ref 3.8–5.2)
SODIUM SERPL-SCNC: 141 MMOL/L (ref 135–145)
SYSTOLIC BLOOD PRESSURE - MUSE: NORMAL MMHG
T AXIS - MUSE: 96 DEGREES
TROPONIN T SERPL HS-MCNC: 33 NG/L
VENTRICULAR RATE- MUSE: 118 BPM
WBC # BLD AUTO: 10 10E3/UL (ref 4–11)

## 2023-12-27 PROCEDURE — 96366 THER/PROPH/DIAG IV INF ADDON: CPT

## 2023-12-27 PROCEDURE — 80053 COMPREHEN METABOLIC PANEL: CPT | Performed by: EMERGENCY MEDICINE

## 2023-12-27 PROCEDURE — 93005 ELECTROCARDIOGRAM TRACING: CPT

## 2023-12-27 PROCEDURE — 83880 ASSAY OF NATRIURETIC PEPTIDE: CPT | Performed by: INTERNAL MEDICINE

## 2023-12-27 PROCEDURE — 99223 1ST HOSP IP/OBS HIGH 75: CPT | Performed by: INTERNAL MEDICINE

## 2023-12-27 PROCEDURE — 71046 X-RAY EXAM CHEST 2 VIEWS: CPT

## 2023-12-27 PROCEDURE — 210N000002 HC R&B HEART CARE

## 2023-12-27 PROCEDURE — 84484 ASSAY OF TROPONIN QUANT: CPT | Performed by: EMERGENCY MEDICINE

## 2023-12-27 PROCEDURE — 258N000003 HC RX IP 258 OP 636: Performed by: EMERGENCY MEDICINE

## 2023-12-27 PROCEDURE — 85025 COMPLETE CBC W/AUTO DIFF WBC: CPT | Performed by: EMERGENCY MEDICINE

## 2023-12-27 PROCEDURE — 36415 COLL VENOUS BLD VENIPUNCTURE: CPT | Performed by: EMERGENCY MEDICINE

## 2023-12-27 PROCEDURE — 99291 CRITICAL CARE FIRST HOUR: CPT | Mod: 25

## 2023-12-27 PROCEDURE — 250N000011 HC RX IP 250 OP 636: Performed by: EMERGENCY MEDICINE

## 2023-12-27 PROCEDURE — 250N000009 HC RX 250: Performed by: EMERGENCY MEDICINE

## 2023-12-27 PROCEDURE — 96376 TX/PRO/DX INJ SAME DRUG ADON: CPT

## 2023-12-27 PROCEDURE — 96365 THER/PROPH/DIAG IV INF INIT: CPT

## 2023-12-27 RX ORDER — FUROSEMIDE 10 MG/ML
20 INJECTION INTRAMUSCULAR; INTRAVENOUS ONCE
Status: COMPLETED | OUTPATIENT
Start: 2023-12-27 | End: 2023-12-27

## 2023-12-27 RX ORDER — DILTIAZEM HYDROCHLORIDE 5 MG/ML
20 INJECTION INTRAVENOUS ONCE
Status: COMPLETED | OUTPATIENT
Start: 2023-12-27 | End: 2023-12-27

## 2023-12-27 RX ADMIN — DILTIAZEM HYDROCHLORIDE 5 MG/HR: 5 INJECTION INTRAVENOUS at 18:02

## 2023-12-27 RX ADMIN — DILTIAZEM HYDROCHLORIDE 20 MG: 5 INJECTION, SOLUTION INTRAVENOUS at 17:41

## 2023-12-27 RX ADMIN — FUROSEMIDE 20 MG: 10 INJECTION, SOLUTION INTRAMUSCULAR; INTRAVENOUS at 20:26

## 2023-12-27 ASSESSMENT — ACTIVITIES OF DAILY LIVING (ADL)
ADLS_ACUITY_SCORE: 37

## 2023-12-27 NOTE — TELEPHONE ENCOUNTER
I approve home care orders   I agree with your recommendation of going to ED  Dr.Nasima Kerline MD

## 2023-12-27 NOTE — PHARMACY-ADMISSION MEDICATION HISTORY
Pharmacist Admission Medication History    Admission medication history is complete. The information provided in this note is only as accurate as the sources available at the time of the update.    Information Source(s): Patient via in-person        Changes made to PTA medication list:  Added: None  Deleted: None  Changed: None           Allergies reviewed with patient and updates made in EHR: yes    Medication History Completed By: Tim Zarate [facility] 12/27/2023 4:51 PM    PTA Med List   Medication Sig Last Dose    acetaminophen (TYLENOL) 500 MG tablet Take 1,000 mg by mouth 2 times daily 12/26/2023 at PM    albuterol (PROAIR HFA/PROVENTIL HFA/VENTOLIN HFA) 108 (90 Base) MCG/ACT inhaler Inhale 2 puffs into the lungs every 6 hours as needed for shortness of breath, wheezing or cough For shortness of breath 12/27/2023    apixaban ANTICOAGULANT (ELIQUIS) 2.5 MG tablet Take 1 tablet (2.5 mg) by mouth 2 times daily 12/26/2023 at PM    atorvastatin (LIPITOR) 20 MG tablet TAKE 1 TABLET BY MOUTH DAILY FOR CHOLESTEROL 12/26/2023 at PM    buPROPion (WELLBUTRIN SR) 100 MG 12 hr tablet Take 1 tablet (100 mg) by mouth daily for mood 12/26/2023 at PM    calcium carbonate (TUMS) 500 MG chewable tablet Take 1 chew tab by mouth 2 times daily as needed for heartburn  at PRN    camphor-menthol-methyl salicylate 3.1-6-10 % PTCH Apply 1 patch topically as needed (hand pain) Or back pain  at PRN    clopidogrel (PLAVIX) 75 MG tablet TAKE 1 TABLET BY MOUTH DAILY 12/26/2023 at PM    DULoxetine (CYMBALTA) 60 MG capsule Take 1 capsule (60 mg) by mouth daily 12/26/2023 at PM    fexofenadine (ALLEGRA) 180 MG tablet Take 180 mg by mouth every evening 12/26/2023 at PM    fluticasone-salmeterol (WIXELA INHUB) 100-50 MCG/ACT inhaler USE 1 INHALATION BY MOUTH EVERY  12 HOURS (Patient taking differently: Inhale 1 puff into the lungs daily) 12/26/2023 at AM    furosemide (LASIX) 20 MG tablet Take 1 tablet (20 mg) by mouth daily 12/26/2023 at PM     glimepiride (AMARYL) 2 MG tablet Take 1 tablet (2 mg) by mouth 2 times daily (before meals) 12/26/2023 at PM    Menthol (Topical Analgesic) 7.5 % (Roll) MISC Apply to affected area every morning 12/26/2023    metoprolol tartrate (LOPRESSOR) 25 MG tablet Take 1 tablet (25 mg) by mouth 2 times daily 12/26/2023 at PM    Multiple Vitamins-Minerals (HAIR SKIN AND NAILS FORMULA PO) Take 1 tablet by mouth daily 12/26/2023 at PM    nitroGLYcerin (NITROSTAT) 0.4 MG sublingual tablet FOR CHEST PAIN PLACE 1 TABLET  UNDER TONGUE EVERY 5 MINUTES FOR 3 DOSES. IF SYMPTOMS PERSIST 5  MINUTES AFTER 1ST DOSE CALL 911  at PRN    pantoprazole (PROTONIX) 20 MG EC tablet Take 1 tablet (20 mg) by mouth daily 12/26/2023 at AM    Vitamin D3 (CHOLECALCIFEROL) 25 mcg (1000 units) tablet Take 25 mcg by mouth daily 12/26/2023 at PM

## 2023-12-27 NOTE — TELEPHONE ENCOUNTER
"  Home Care is calling regarding an established patient with [facility].        10/18/2023    11:57 AM   Home Care Information   Date of Home Care episode start 10/18/2023   Current following provider Dr. Churchill     Requesting orders from: Maryan Churchill  Provider is following patient: Yes  Is this a 60-day recertification request?  No    Orders Requested    Skilled Nursing  Request for initial evaluation and treatment (one time)   BP checks    Confirmed ok to leave a detailed message with call back.  Contact information confirmed and updated as needed.    Deann Owen RN      FYI: Pt was having difficulty breathing during visit. Other symptoms; BP cuff reading \"error\" several times, heart rate irreg. . Home health instructed pt to ED - Refused due to cost. Pt heart irregular, pt symptomatic - shortness of breath.     Attempted to reach pt to triage, no answer.   Current symptoms, pt needing inhaler x 2 during visit this morning.     Plan: Home health will call 911 for EMS to eval/tx pt.     Routing to provider please advise if additional recommendations for plan of care.     Home health PT requesting skilled nursing. Does pt need appointment for this? Telephone ok?   "

## 2023-12-27 NOTE — ED TRIAGE NOTES
Shortness of breath increasing. Pt states she has had afib in the past.     Triage Assessment (Adult)       Row Name 12/27/23 1529          Respiratory WDL    Rhythm/Pattern, Respiratory shortness of breath  Taken immediately for EKG and Vitals upon arrival to Triage

## 2023-12-27 NOTE — TELEPHONE ENCOUNTER
"Nurse Triage SBAR    Is this a 2nd Level Triage? YES, LICENSED PRACTITIONER REVIEW IS REQUIRED    Situation: Patient having currently some SOB that has become worse. Patient had very bad SOB last night. Patient has been \"focusing on breathing\" and box breathing. Patient was hospitalized with Afib previously and says this is very similar. Neighbor agreed to take patient to the ER.     Background: Patient has had a history of Afib    Assessment: Patient needs to be seen.     Protocol Recommended Disposition:   Go to ED Now    Recommendation: Patient needs to go to the ER.     Sent to the ER     Does the patient meet one of the following criteria for ADS visit consideration? No    Reason for Disposition   MODERATE difficulty breathing (e.g., speaks in phrases, SOB even at rest, pulse 100-120) of new-onset or worse than normal    Additional Information   Negative: SEVERE difficulty breathing (e.g., struggling for each breath, speaks in single words, pulse > 120)   Negative: Breathing stopped and hasn't returned   Negative: Choking on something   Negative: Bluish (or gray) lips or face   Negative: Difficult to awaken or acting confused (e.g., disoriented, slurred speech)   Negative: Passed out (i.e., fainted, collapsed and was not responding)   Negative: Wheezing started suddenly after medicine, an allergic food, or bee sting   Negative: Stridor (harsh sound while breathing in)   Negative: Slow, shallow and weak breathing   Negative: Sounds like a life-threatening emergency to the triager   Negative: Chest pain   Negative: Wheezing (high pitched whistling sound) and previous asthma attacks or use of asthma medicines   Negative: Difficulty breathing and within 14 days of COVID-19 Exposure   Negative: Difficulty breathing and only present when coughing   Negative: Difficulty breathing and only from stuffy nose   Negative: Difficulty breathing and only from stuffy nose or runny nose from common cold    Answer Assessment - " "Initial Assessment Questions  1. RESPIRATORY STATUS: \"Describe your breathing?\" (e.g., wheezing, shortness of breath, unable to speak, severe coughing)       Shortness of breath   2. ONSET: \"When did this breathing problem begin?\"       In hospital a month ago, a little bit every day, last night real bad   3. PATTERN \"Does the difficult breathing come and go, or has it been constant since it started?\"       With activity it gets worse   4. SEVERITY: \"How bad is your breathing?\" (e.g., mild, moderate, severe)     - MILD: No SOB at rest, mild SOB with walking, speaks normally in sentences, can lie down, no retractions, pulse < 100.     - MODERATE: SOB at rest, SOB with minimal exertion and prefers to sit, cannot lie down flat, speaks in phrases, mild retractions, audible wheezing, pulse 100-120.     - SEVERE: Very SOB at rest, speaks in single words, struggling to breathe, sitting hunched forward, retractions, pulse > 120       Moderate   5. RECURRENT SYMPTOM: \"Have you had difficulty breathing before?\" If Yes, ask: \"When was the last time?\" and \"What happened that time?\"       Yes- hospitalized  6. CARDIAC HISTORY: \"Do you have any history of heart disease?\" (e.g., heart attack, angina, bypass surgery, angioplasty)       A fib   7. LUNG HISTORY: \"Do you have any history of lung disease?\"  (e.g., pulmonary embolus, asthma, emphysema)      no  8. CAUSE: \"What do you think is causing the breathing problem?\"       No idea   9. OTHER SYMPTOMS: \"Do you have any other symptoms? (e.g., dizziness, runny nose, cough, chest pain, fever)      No chest pain  10. O2 SATURATION MONITOR:  \"Do you use an oxygen saturation monitor (pulse oximeter) at home?\" If Yes, ask: \"What is your reading (oxygen level) today?\" \"What is your usual oxygen saturation reading?\" (e.g., 95%)        NA  11. PREGNANCY: \"Is there any chance you are pregnant?\" \"When was your last menstrual period?\"        No  12. TRAVEL: \"Have you traveled out of the " "country in the last month?\" (e.g., travel history, exposures)        No    Protocols used: Breathing Difficulty-A-[place]    "

## 2023-12-28 ENCOUNTER — APPOINTMENT (OUTPATIENT)
Dept: CARDIOLOGY | Facility: CLINIC | Age: 88
DRG: 280 | End: 2023-12-28
Attending: INTERNAL MEDICINE
Payer: COMMERCIAL

## 2023-12-28 ENCOUNTER — APPOINTMENT (OUTPATIENT)
Dept: OCCUPATIONAL THERAPY | Facility: CLINIC | Age: 88
DRG: 280 | End: 2023-12-28
Attending: INTERNAL MEDICINE
Payer: COMMERCIAL

## 2023-12-28 LAB
ANION GAP SERPL CALCULATED.3IONS-SCNC: 11 MMOL/L (ref 7–15)
BASOPHILS # BLD AUTO: 0 10E3/UL (ref 0–0.2)
BASOPHILS NFR BLD AUTO: 0 %
BUN SERPL-MCNC: 31.2 MG/DL (ref 8–23)
CALCIUM SERPL-MCNC: 9.2 MG/DL (ref 8.2–9.6)
CHLORIDE SERPL-SCNC: 103 MMOL/L (ref 98–107)
CREAT SERPL-MCNC: 1.59 MG/DL (ref 0.51–0.95)
DEPRECATED HCO3 PLAS-SCNC: 25 MMOL/L (ref 22–29)
EGFRCR SERPLBLD CKD-EPI 2021: 31 ML/MIN/1.73M2
EOSINOPHIL # BLD AUTO: 0.2 10E3/UL (ref 0–0.7)
EOSINOPHIL NFR BLD AUTO: 2 %
ERYTHROCYTE [DISTWIDTH] IN BLOOD BY AUTOMATED COUNT: 15.9 % (ref 10–15)
GLUCOSE BLDC GLUCOMTR-MCNC: 120 MG/DL (ref 70–99)
GLUCOSE BLDC GLUCOMTR-MCNC: 131 MG/DL (ref 70–99)
GLUCOSE BLDC GLUCOMTR-MCNC: 147 MG/DL (ref 70–99)
GLUCOSE BLDC GLUCOMTR-MCNC: 173 MG/DL (ref 70–99)
GLUCOSE SERPL-MCNC: 136 MG/DL (ref 70–99)
HCT VFR BLD AUTO: 41.9 % (ref 35–47)
HGB BLD-MCNC: 12.5 G/DL (ref 11.7–15.7)
IMM GRANULOCYTES # BLD: 0.1 10E3/UL
IMM GRANULOCYTES NFR BLD: 1 %
LVEF ECHO: NORMAL
LYMPHOCYTES # BLD AUTO: 1.8 10E3/UL (ref 0.8–5.3)
LYMPHOCYTES NFR BLD AUTO: 17 %
MCH RBC QN AUTO: 25.8 PG (ref 26.5–33)
MCHC RBC AUTO-ENTMCNC: 29.8 G/DL (ref 31.5–36.5)
MCV RBC AUTO: 87 FL (ref 78–100)
MONOCYTES # BLD AUTO: 0.8 10E3/UL (ref 0–1.3)
MONOCYTES NFR BLD AUTO: 8 %
NEUTROPHILS # BLD AUTO: 7.6 10E3/UL (ref 1.6–8.3)
NEUTROPHILS NFR BLD AUTO: 72 %
NRBC # BLD AUTO: 0 10E3/UL
NRBC BLD AUTO-RTO: 0 /100
PLATELET # BLD AUTO: 290 10E3/UL (ref 150–450)
POTASSIUM SERPL-SCNC: 4.3 MMOL/L (ref 3.4–5.3)
RBC # BLD AUTO: 4.84 10E6/UL (ref 3.8–5.2)
SODIUM SERPL-SCNC: 139 MMOL/L (ref 135–145)
TROPONIN T SERPL HS-MCNC: 31 NG/L
TROPONIN T SERPL HS-MCNC: 34 NG/L
WBC # BLD AUTO: 10.4 10E3/UL (ref 4–11)

## 2023-12-28 PROCEDURE — 258N000003 HC RX IP 258 OP 636: Performed by: INTERNAL MEDICINE

## 2023-12-28 PROCEDURE — 85025 COMPLETE CBC W/AUTO DIFF WBC: CPT | Performed by: INTERNAL MEDICINE

## 2023-12-28 PROCEDURE — 93308 TTE F-UP OR LMTD: CPT | Mod: 26 | Performed by: INTERNAL MEDICINE

## 2023-12-28 PROCEDURE — 250N000011 HC RX IP 250 OP 636: Performed by: INTERNAL MEDICINE

## 2023-12-28 PROCEDURE — 255N000002 HC RX 255 OP 636: Performed by: INTERNAL MEDICINE

## 2023-12-28 PROCEDURE — 36415 COLL VENOUS BLD VENIPUNCTURE: CPT | Performed by: INTERNAL MEDICINE

## 2023-12-28 PROCEDURE — 80048 BASIC METABOLIC PNL TOTAL CA: CPT | Performed by: INTERNAL MEDICINE

## 2023-12-28 PROCEDURE — 93325 DOPPLER ECHO COLOR FLOW MAPG: CPT | Mod: 26 | Performed by: INTERNAL MEDICINE

## 2023-12-28 PROCEDURE — 210N000001 HC R&B IMCU HEART CARE

## 2023-12-28 PROCEDURE — 99233 SBSQ HOSP IP/OBS HIGH 50: CPT | Performed by: HOSPITALIST

## 2023-12-28 PROCEDURE — 999N000147 HC STATISTIC PT IP EVAL DEFER

## 2023-12-28 PROCEDURE — 250N000013 HC RX MED GY IP 250 OP 250 PS 637: Performed by: INTERNAL MEDICINE

## 2023-12-28 PROCEDURE — 97535 SELF CARE MNGMENT TRAINING: CPT | Mod: GO

## 2023-12-28 PROCEDURE — 99223 1ST HOSP IP/OBS HIGH 75: CPT | Mod: 25 | Performed by: INTERNAL MEDICINE

## 2023-12-28 PROCEDURE — 250N000009 HC RX 250: Performed by: INTERNAL MEDICINE

## 2023-12-28 PROCEDURE — 84484 ASSAY OF TROPONIN QUANT: CPT | Performed by: INTERNAL MEDICINE

## 2023-12-28 PROCEDURE — 97165 OT EVAL LOW COMPLEX 30 MIN: CPT | Mod: GO

## 2023-12-28 PROCEDURE — 93321 DOPPLER ECHO F-UP/LMTD STD: CPT | Mod: 26 | Performed by: INTERNAL MEDICINE

## 2023-12-28 PROCEDURE — 250N000013 HC RX MED GY IP 250 OP 250 PS 637: Performed by: HOSPITALIST

## 2023-12-28 PROCEDURE — 999N000208 ECHOCARDIOGRAM LIMITED

## 2023-12-28 RX ORDER — FUROSEMIDE 10 MG/ML
40 INJECTION INTRAMUSCULAR; INTRAVENOUS EVERY 8 HOURS
Status: DISCONTINUED | OUTPATIENT
Start: 2023-12-28 | End: 2023-12-29

## 2023-12-28 RX ORDER — LIDOCAINE 40 MG/G
CREAM TOPICAL
Status: DISCONTINUED | OUTPATIENT
Start: 2023-12-28 | End: 2023-12-28

## 2023-12-28 RX ORDER — DEXTROSE MONOHYDRATE 25 G/50ML
25-50 INJECTION, SOLUTION INTRAVENOUS
Status: DISCONTINUED | OUTPATIENT
Start: 2023-12-28 | End: 2023-12-31 | Stop reason: HOSPADM

## 2023-12-28 RX ORDER — FLUTICASONE PROPIONATE 50 MCG
1 SPRAY, SUSPENSION (ML) NASAL DAILY PRN
Status: DISCONTINUED | OUTPATIENT
Start: 2023-12-28 | End: 2023-12-31 | Stop reason: HOSPADM

## 2023-12-28 RX ORDER — ACETAMINOPHEN 650 MG/1
650 SUPPOSITORY RECTAL EVERY 4 HOURS PRN
Status: DISCONTINUED | OUTPATIENT
Start: 2023-12-28 | End: 2023-12-31 | Stop reason: HOSPADM

## 2023-12-28 RX ORDER — BUPROPION HYDROCHLORIDE 100 MG/1
100 TABLET, EXTENDED RELEASE ORAL DAILY
Status: DISCONTINUED | OUTPATIENT
Start: 2023-12-28 | End: 2023-12-31 | Stop reason: HOSPADM

## 2023-12-28 RX ORDER — ONDANSETRON 4 MG/1
4 TABLET, ORALLY DISINTEGRATING ORAL EVERY 6 HOURS PRN
Status: DISCONTINUED | OUTPATIENT
Start: 2023-12-28 | End: 2023-12-31 | Stop reason: HOSPADM

## 2023-12-28 RX ORDER — ATORVASTATIN CALCIUM 20 MG/1
20 TABLET, FILM COATED ORAL DAILY
Status: DISCONTINUED | OUTPATIENT
Start: 2023-12-28 | End: 2023-12-31 | Stop reason: HOSPADM

## 2023-12-28 RX ORDER — FEXOFENADINE HCL 180 MG/1
180 TABLET ORAL EVERY EVENING
Status: DISCONTINUED | OUTPATIENT
Start: 2023-12-28 | End: 2023-12-31 | Stop reason: HOSPADM

## 2023-12-28 RX ORDER — PANTOPRAZOLE SODIUM 20 MG/1
20 TABLET, DELAYED RELEASE ORAL DAILY
Status: DISCONTINUED | OUTPATIENT
Start: 2023-12-28 | End: 2023-12-31 | Stop reason: HOSPADM

## 2023-12-28 RX ORDER — LIDOCAINE 40 MG/G
CREAM TOPICAL
Status: DISCONTINUED | OUTPATIENT
Start: 2023-12-28 | End: 2023-12-31 | Stop reason: HOSPADM

## 2023-12-28 RX ORDER — METOPROLOL TARTRATE 50 MG
50 TABLET ORAL 2 TIMES DAILY
Status: DISCONTINUED | OUTPATIENT
Start: 2023-12-28 | End: 2023-12-29

## 2023-12-28 RX ORDER — CLOPIDOGREL BISULFATE 75 MG/1
75 TABLET ORAL DAILY
Status: DISCONTINUED | OUTPATIENT
Start: 2023-12-28 | End: 2023-12-29

## 2023-12-28 RX ORDER — NITROGLYCERIN 0.4 MG/1
0.4 TABLET SUBLINGUAL EVERY 5 MIN PRN
Status: DISCONTINUED | OUTPATIENT
Start: 2023-12-28 | End: 2023-12-28

## 2023-12-28 RX ORDER — NITROGLYCERIN 0.4 MG/1
0.4 TABLET SUBLINGUAL EVERY 5 MIN PRN
Status: DISCONTINUED | OUTPATIENT
Start: 2023-12-28 | End: 2023-12-31 | Stop reason: HOSPADM

## 2023-12-28 RX ORDER — DULOXETIN HYDROCHLORIDE 60 MG/1
60 CAPSULE, DELAYED RELEASE ORAL DAILY
Status: DISCONTINUED | OUTPATIENT
Start: 2023-12-28 | End: 2023-12-31 | Stop reason: HOSPADM

## 2023-12-28 RX ORDER — ACETAMINOPHEN 325 MG/1
650 TABLET ORAL EVERY 4 HOURS PRN
Status: DISCONTINUED | OUTPATIENT
Start: 2023-12-28 | End: 2023-12-31 | Stop reason: HOSPADM

## 2023-12-28 RX ORDER — POLYETHYLENE GLYCOL 3350 17 G/17G
17 POWDER, FOR SOLUTION ORAL 2 TIMES DAILY PRN
Status: DISCONTINUED | OUTPATIENT
Start: 2023-12-28 | End: 2023-12-31 | Stop reason: HOSPADM

## 2023-12-28 RX ORDER — CALCIUM CARBONATE 500 MG/1
500 TABLET, CHEWABLE ORAL 2 TIMES DAILY PRN
Status: DISCONTINUED | OUTPATIENT
Start: 2023-12-28 | End: 2023-12-31 | Stop reason: HOSPADM

## 2023-12-28 RX ORDER — ALBUTEROL SULFATE 90 UG/1
2 AEROSOL, METERED RESPIRATORY (INHALATION) EVERY 6 HOURS PRN
Status: DISCONTINUED | OUTPATIENT
Start: 2023-12-28 | End: 2023-12-31 | Stop reason: HOSPADM

## 2023-12-28 RX ORDER — ACETAMINOPHEN 500 MG
1000 TABLET ORAL 2 TIMES DAILY
Status: DISCONTINUED | OUTPATIENT
Start: 2023-12-28 | End: 2023-12-31 | Stop reason: HOSPADM

## 2023-12-28 RX ORDER — NICOTINE POLACRILEX 4 MG
15-30 LOZENGE BUCCAL
Status: DISCONTINUED | OUTPATIENT
Start: 2023-12-28 | End: 2023-12-31 | Stop reason: HOSPADM

## 2023-12-28 RX ORDER — FLUTICASONE FUROATE AND VILANTEROL 100; 25 UG/1; UG/1
1 POWDER RESPIRATORY (INHALATION) DAILY
Status: DISCONTINUED | OUTPATIENT
Start: 2023-12-28 | End: 2023-12-31 | Stop reason: HOSPADM

## 2023-12-28 RX ORDER — ONDANSETRON 2 MG/ML
4 INJECTION INTRAMUSCULAR; INTRAVENOUS EVERY 6 HOURS PRN
Status: DISCONTINUED | OUTPATIENT
Start: 2023-12-28 | End: 2023-12-31 | Stop reason: HOSPADM

## 2023-12-28 RX ADMIN — APIXABAN 2.5 MG: 2.5 TABLET, FILM COATED ORAL at 20:27

## 2023-12-28 RX ADMIN — FLUTICASONE FUROATE AND VILANTEROL TRIFENATATE 1 PUFF: 100; 25 POWDER RESPIRATORY (INHALATION) at 09:36

## 2023-12-28 RX ADMIN — METOPROLOL TARTRATE 50 MG: 50 TABLET, FILM COATED ORAL at 20:27

## 2023-12-28 RX ADMIN — HUMAN ALBUMIN MICROSPHERES AND PERFLUTREN 3 ML: 10; .22 INJECTION, SOLUTION INTRAVENOUS at 10:38

## 2023-12-28 RX ADMIN — ACETAMINOPHEN 1000 MG: 500 TABLET, FILM COATED ORAL at 09:31

## 2023-12-28 RX ADMIN — CLOPIDOGREL BISULFATE 75 MG: 75 TABLET ORAL at 09:31

## 2023-12-28 RX ADMIN — DILTIAZEM HYDROCHLORIDE 5 MG/HR: 5 INJECTION INTRAVENOUS at 09:30

## 2023-12-28 RX ADMIN — ACETAMINOPHEN 1000 MG: 500 TABLET, FILM COATED ORAL at 20:27

## 2023-12-28 RX ADMIN — MICONAZOLE NITRATE: 2 POWDER TOPICAL at 20:39

## 2023-12-28 RX ADMIN — FUROSEMIDE 40 MG: 10 INJECTION, SOLUTION INTRAMUSCULAR; INTRAVENOUS at 13:40

## 2023-12-28 RX ADMIN — APIXABAN 2.5 MG: 2.5 TABLET, FILM COATED ORAL at 09:31

## 2023-12-28 RX ADMIN — ATORVASTATIN CALCIUM 20 MG: 20 TABLET, FILM COATED ORAL at 09:32

## 2023-12-28 RX ADMIN — PANTOPRAZOLE SODIUM 20 MG: 20 TABLET, DELAYED RELEASE ORAL at 09:32

## 2023-12-28 RX ADMIN — DULOXETINE HYDROCHLORIDE 60 MG: 60 CAPSULE, DELAYED RELEASE ORAL at 09:31

## 2023-12-28 RX ADMIN — FUROSEMIDE 40 MG: 10 INJECTION, SOLUTION INTRAMUSCULAR; INTRAVENOUS at 06:13

## 2023-12-28 RX ADMIN — FUROSEMIDE 40 MG: 10 INJECTION, SOLUTION INTRAMUSCULAR; INTRAVENOUS at 20:40

## 2023-12-28 RX ADMIN — METOPROLOL TARTRATE 50 MG: 50 TABLET, FILM COATED ORAL at 09:31

## 2023-12-28 RX ADMIN — FEXOFENADINE HCL 180 MG: 180 TABLET ORAL at 20:27

## 2023-12-28 RX ADMIN — BUPROPION HYDROCHLORIDE 100 MG: 100 TABLET, FILM COATED, EXTENDED RELEASE ORAL at 09:32

## 2023-12-28 ASSESSMENT — ACTIVITIES OF DAILY LIVING (ADL)
ADLS_ACUITY_SCORE: 36
ADLS_ACUITY_SCORE: 40
ADLS_ACUITY_SCORE: 40
DEPENDENT_IADLS:: INDEPENDENT;CLEANING
ADLS_ACUITY_SCORE: 37
ADLS_ACUITY_SCORE: 40
ADLS_ACUITY_SCORE: 36
ADLS_ACUITY_SCORE: 40
ADLS_ACUITY_SCORE: 36
ADLS_ACUITY_SCORE: 36

## 2023-12-28 NOTE — PROGRESS NOTES
[facility]    Medicine Progress Note - Hospitalist Service    Date of Admission:  12/27/2023    Assessment & Plan   Moira Andre is a 89-year-old female with history of asthma, mild chronic kidney disease stage III, heart failure with preserved ejection fraction, diabetes mellitus type 2, hypertension, coronary artery disease, rheumatoid arthritis, obstructive sleep apnea not currently using CPAP, TIA, hyperlipidemia, GERD, atrial fibrillation on chronic anticoagulation with Eliquis come to the ER with complaint of worsening shortness of breath for the last couple of weeks.    Atrial fibrillation with RVR  Chronic anticoagulation with Eliquis  Moira Andre is a 89-year-old female with multiple comorbidities as mentioned above, found to be in A-fib with RVR with a heart rate of 142 on admission. She was started on IV Cardizem infusion with improvement in rate control.  Admitted to inpatient under IMC status.  Discontinue IMC status as she has been weaned off the diltiazem infusion.  Continue PTA metoprolol, dose increased from 25 mg PO BID to 50 mg PO BID.  Continue PTA Eliquis.  Cardiology consulted, appreciate their assistance.    Acute on chronic diastolic congestive heart failure/HFpEF  Moderate aortic stenosis  Moderate mitral stenosis  Patient presented with worsening shortness of breath, dyspnea on exertion, orthopnea, PND, lower extremity edema. Chest x-ray consistent with pulmonary edema, small pleural effusions as well. BNP elevated at 4,160.  Diuresing with IV lasix 40 mg every 8 hours.  Monitor I&O and daily weights.  TTE on 12/28/23 showed LVEF 55-60%, moderate MS and AS.  Low salt diet, discussed with patient.  Cardiology consulted as noted above.    Coronary artery disease  NSTEMI type II  Hypertension  Hyperlipidemia  History of coronary artery disease status post stent in the past, she is on Eliquis as well as Plavix.  Stents were done many years ago.  She is not on  aspirin because of allergy. Troponin mildly elevated at 33 in the ER, flat on recheck. EKG showed atrial fibrillation with RVR.  Mildly elevated troponin felt to be secondary to A-fib with RVR and CHF and likely demand ischemia rather than ACS at this time.  She has no chest pain.  PTA metoprolol increased to 50 mg PO BID at the time of admission for rate control.  Continue PTA atorvastatin.  PTA plavix discontinued by cardiology on 12/28/23 by cardiology as it was felt to no longer be indicated.    Diabetes mellitus type 2  Hemoglobin A1c 7.9% on 10/26/23.  Hold PTA glimepiride.  Monitor blood sugars and use medium dose sliding scale NovoLog.  Moderate carbohydrate diet.  Monitor for hypoglycemia.    Chronic kidney disease, stage IIIb  Her baseline creatinine is between 1.6-1.8.  Monitor renal function with diuresis.    Obstructive sleep apnea  She does have a CPAP, but not using as there is a recall.  CPAP ordered per home settigns in the hospital.    Asthma  No acute exacerbation at this time.  Continue PTA Wixela inhub inhaler or formulary equivalent and PRN albuterol inhaler.    Depression  Anxiety  Continue PTA wellbutrin and cymbalta.    GERD  Continue PTA pantoprazole.          Diet: Fluid restriction 1500 ML FLUID  Combination Diet Regular Diet Adult; Moderate Consistent Carb (60 g CHO per Meal) Diet; 2 gm NA Diet    DVT Prophylaxis: DOAC  Charles Catheter: Not present  Lines: None     Cardiac Monitoring: ACTIVE order. Indication: [facility] [place]  Code Status: Full Code      Clinically Significant Risk Factors Present on Admission               # Drug Induced Coagulation Defect: home medication list includes an anticoagulant medication  # Drug Induced Platelet Defect: home medication list includes an antiplatelet medication   # Hypertension: Noted on problem list     # DMII: A1C = 7.9 % (Ref range: 0.0 - 5.6 %) within past 6 months    # Severe Obesity: Estimated body mass index is 41.63 kg/m  as calculated from the  "following:    Height as of this encounter: 1.702 m (5' 7\").    Weight as of this encounter: 120.6 kg (265 lb 12.8 oz).       # Financial/Environmental Concerns: none  # Asthma: noted on problem list        Disposition Plan      Expected Discharge Date: 12/30/2023      Destination: home with help/services              Nikolas Vang MD  Hospitalist Service  [facility]  Securely message with [employer] (more info)  Text page via [employer] Paging/Directory   ______________________________________________________________________    Interval History   Moira Andre was seen today. She feels better today. Shortness of breath improved. Occasional cough. Denies fevers, chest pain, nausea, abdominal pain. Discussed plan of care, diuretics, rate control medications.    Physical Exam   Vital Signs: Temp: 97.6  F (36.4  C) Temp src: Oral BP: 118/81 Pulse: 66   Resp: 12 SpO2: 94 % O2 Device: None (Room air) Oxygen Delivery: 1.5 LPM  Weight: 265 lbs 12.8 oz    Constitutional: awake, alert, cooperative, no apparent distress, sitting up in a chair  Respiratory: no increased work of breathing, few crackles at the bases  Cardiovascular: irregular rhythm, normal rate, normal S1 and S2, systolic murmur noted  GI: normal bowel sounds, soft, non-distended, non-tender  Skin: warm, dry  Musculoskeletal: no lower extremity pitting edema present  Neurologic: awake, alert, answers questions appropriately, moves all extremities    Medical Decision Making       55 MINUTES SPENT BY ME on the date of service doing chart review, history, exam, documentation & further activities per the note.      Data     I have personally reviewed the following data over the past 24 hrs:    10.4  \   12.5   / 290     139 103 31.2 (H) /  131 (H)   4.3 25 1.59 (H) \     Trop: 31 (H) BNP: N/A       Imaging results reviewed over the past 24 hrs:   Recent Results (from the past 24 hour(s))   Echo Limited   Result Value    LVEF  55-60%    " [facility]    [id number]  [id number]  [id number]^MAMI^NATALY^SOY     [facility]  Echocardiography Laboratory  [address] [place] [address]     Name: JOSIE ARRIAGA  MRN: [id number]  : 1933  Study Date: 2023 10:15 AM  Age: 89 yrs  Gender: Female  Patient Location: [facility]  Reason For Study: Heart Failure  Ordering Physician: NATALY BOLAÑOS  Referring Physician: GUI ERIC  Performed By: Julia Espinoza     BSA: 2.3 m2  Height: 67 in  Weight: 265 lb  HR: 84  BP: 145/77 mmHg  ______________________________________________________________________________  Procedure  Limited Echo Adult. Technically difficult study. Poor acoustic windows.  ______________________________________________________________________________  Interpretation Summary     This was a technically VERY difficult study. Limited echocardiogram.     Normal LV size and systolic function.  Normal RV size and systolic function.  Mitral apparatus is calcified with probably mild to moderate/moderate mitral  stenosis. Valve is not well-seen. Mean gradient is 5 to 7 mmHg.  Aortic valve is not well-seen. As previously described, moderate aortic  stenosis. Maximally obtained mean gradient is 24 mmHg with calculated aortic  valve area of 1.3 cmÂ .     No significant changes compared to echocardiogram 2 months ago.  ______________________________________________________________________________  Left Ventricle  The left ventricle is normal in size. The visual ejection fraction is 55-60%.     Right Ventricle  The right ventricle is normal in size and function.     Atria  The left atrium is mildly dilated.     Mitral Valve  There is severe mitral annular calcification. The mitral valve is not well  visualized. The mitral valve leaflets appear thickened, but open well. There  is trace to mild mitral regurgitation. The mean mitral valve gradient is 5.5  mmHg. Calcified mitral apparatus causing mitral stenosis. There  is mild to  moderate mitral stenosis.     Aortic Valve  The aortic valve is not well visualized. The mean AoV pressure gradient is  21.7 mmHg. The calculated aortic valve are is 1.3 cm^2. Moderate valvular  aortic stenosis. Increased aortic valve velocity. The peak AoV pressure  gradient is 38.0 mmHg.     Vessels  The aortic root is normal size. The inferior vena cava was normal in size with  preserved respiratory variability.     Pericardium  There is no pericardial effusion.  ______________________________________________________________________________  MMode/2D Measurements & Calculations  asc Aorta Diam: 3.4 cm  LVOT diam: 2.1 cm  LVOT area: 3.5 cm2  Asc Ao diam index BSA (cm/m2): 1.5  Asc Ao diam index Ht(cm/m): 2.0  LA Volume (BP): 91.7 ml     LA Volume Index (BP): 40.2 ml/m2     Doppler Measurements & Calculations  MV max P.4 mmHg  MV mean P.5 mmHg  MV V2 VTI: 41.0 cm  MVA(VTI): 2.1 cm2  Ao V2 max: 309.0 cm/sec  Ao max P.0 mmHg  Ao V2 mean: 218.0 cm/sec  Ao mean P.7 mmHg  Ao V2 VTI: 68.5 cm  MEL(I,D): 1.3 cm2  MEL(V,D): 1.2 cm2  LV V1 max P.7 mmHg  LV V1 max: 108.0 cm/sec  LV V1 VTI: 24.6 cm  SV(LVOT): 85.6 ml  SI(LVOT): 37.6 ml/m2  PA acc time: 0.10 sec  AV Julian Ratio (DI): 0.35  MEL Index (cm2/m2): 0.55     ______________________________________________________________________________  Report approved by: Bere Amado 2023 03:31 PM

## 2023-12-28 NOTE — CONSULTS
[facility]    Cardiology Consultation     Date of Admission:  12/27/2023    Assessment & Plan   Moira Andre is a 89 year old female who was admitted on 12/27/2023.    1.  Acute exacerbation of diastolic heart failure  2.  Atrial fibrillation with rapid ventricular response,   3.  Chronic anticoagulation for high [id number] score  4.  Coronary disease with remote stenting-no angina, troponin flat, RI/D1 PCI in 2007, Cath in 2017  50-60% proximal circumflex lesion, IFR and FFR unremarkable at 0.96 and 0.89 respectively. No  other lesions over 40%.  5.  Untreated sleep apnea  6.  Moderate mitral and aortic stenosis-unchanged from prior echo few months ago    Recommendation:   Continue diuresis with IV Lasix.  Will likely require to be discharged on a higher dose.  Discussed titration based on symptoms.  Strict ins and outs and daily standing weights.  Agree with uptitration of beta-blocker. OFF dilt.  Rates significantly improved.  Continue anticoagulation with Eliquis.  Do not believe Plavix is required in addition to eliquis as PCI was uncomplicated and long time ago.  Discontinue plavix.     High complexity     Josiah Nelson MD, MD    Primary Care Physician   Maryan Churchill    Reason for Consult   Reason for consult: I was asked to evaluate this patient for atrial fibrillation.    History of Present Illness   Moira Andre is a 89 year old female who presents to the hospital for complaints of worsening shortness of breath.  She has a history of coronary disease with remote stenting, moderate mitral and aortic stenosis, type 2 diabetes, hypertension, rheumatoid arthritis, untreated sleep apnea, GERD, hyperlipidemia and TIA.    Patient reports more than usual shortness of breath which is mostly on exertion for the past few weeks. Denies weight gain or chest pain.  In the emergency department, she was noted to have A-fib with RVR with heart rates in the 140s.  She was started on  diltiazem drip and the dose of metoprolol was increased.  Heart rate have improved significantly with this.  NT-proBNP was elevated at 4600.  She is also being diuresed with IV Lasix.  Was on 20 mg of Lasix p.o. daily PTA.  Labs on admission significant for creatinine 1.6, 94 BNP 4100, troponin 31.     Past Medical History   Past Medical History:   Diagnosis Date    Aortic valve sclerosis     heart murmur, no AS    Arrhythmia     PAT, PVC    Aspirin allergy     Plavix use long term    Asthma     CKD (chronic kidney disease) stage 3, GFR 30-59 ml/min (H)     x 2007 atleast    Congestive heart failure, unspecified     Depression     Diabetes mellitus (H) 2010    Diastolic dysfunction, left ventricle 2013    grade 2, nl ef    HTN (hypertension)     Lactic acidosis 08/2018    due to dehydration and metformin    Migraine headache     Mitral stenosis     mild, 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    Myocardial infarction (H) 9/2007, cath 2013 ml    BMS: stent to OM, diag, nl EF, echo /C angia 2013 , f/u cath no lesion >40%    Nephrolithiasis     right side    OA (osteoarthritis) of knee     Obesity     Rheumatoid arthritis flare (H)     prednisone    Sleep apnea     restarted using cpap 2017    TIA (transient ischaemic attack)     Ventral hernia, unspecified, without mention of obstruction or gangrene          Past Surgical History   Past Surgical History:   Procedure Laterality Date    APPENDECTOMY      BIOPSY BREAST      x2 -needle & lumpectomy-benign    CHOLECYSTECTOMY      CORONARY ANGIOGRAPHY ADULT ORDER  9/28/2007    Bare metal stent to OM1, Diagonal patent     CORONARY ANGIOGRAPHY ADULT ORDER  9/25/2007    [place] stent to Diagonal    HC LEFT HEART CATHETERIZATION  8/2013    Moderate CAD    HYSTERECTOMY TOTAL ABDOMINAL      ORTHOPEDIC SURGERY      knee replacement on right side (2006), Left side (2016)    RELEASE CARPAL TUNNEL      right and left    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 Taking?   DULoxetine (CYMBALTA) 60 MG capsule 12/26/2023 at PM Self No Yes   Sig: Take 1 capsule (60 mg) by mouth daily   Menthol (Topical Analgesic) 7.5 % (Roll) MISC 12/26/2023 Self Yes Yes   Sig: Apply to affected area every morning   Multiple Vitamins-Minerals (HAIR SKIN AND NAILS FORMULA PO) 12/26/2023 at PM Self Yes Yes   Sig: Take 1 tablet by mouth daily   Vitamin D3 (CHOLECALCIFEROL) 25 mcg (1000 units) tablet 12/26/2023 at PM Self Yes Yes   Sig: Take 25 mcg by mouth daily   acetaminophen (TYLENOL) 500 MG tablet 12/26/2023 at PM Self Yes Yes   Sig: Take 1,000 mg by mouth 2 times daily   albuterol (PROAIR HFA/PROVENTIL HFA/VENTOLIN HFA) 108 (90 Base) MCG/ACT inhaler 12/27/2023 Self Yes Yes   Sig: Inhale 2 puffs into the lungs every 6 hours as needed for shortness of breath, wheezing or cough For shortness of breath   apixaban ANTICOAGULANT (ELIQUIS) 2.5 MG tablet 12/26/2023 at PM Self No Yes   Sig: Take 1 tablet (2.5 mg) by mouth 2 times daily   atorvastatin (LIPITOR) 20 MG tablet 12/26/2023 at PM Self No Yes   Sig: TAKE 1 TABLET BY MOUTH DAILY FOR CHOLESTEROL   buPROPion (WELLBUTRIN SR) 100 MG 12 hr tablet 12/26/2023 at PM Self No Yes   Sig: Take 1 tablet (100 mg) by mouth daily for mood   calcium carbonate (TUMS) 500 MG chewable tablet  at PRN Self Yes Yes   Sig: Take 1 chew tab by mouth 2 times daily as needed for heartburn   camphor-menthol-methyl salicylate 3.1-6-10 % PTCH  at PRN Self No Yes   Sig: Apply 1 patch topically as needed (hand pain) Or back pain   clopidogrel (PLAVIX) 75 MG tablet 12/26/2023 at PM Self No Yes   Sig: TAKE 1 TABLET BY MOUTH DAILY   fexofenadine (ALLEGRA) 180 MG tablet 12/26/2023 at PM Self Yes Yes   Sig: Take 180 mg by mouth every evening   fluticasone (FLONASE) 50 MCG/ACT nasal spray  at PRN Self Yes No   Sig: Spray 1 spray into both nostrils daily as needed     fluticasone-salmeterol (WIXELA INHUB) 100-50 MCG/ACT inhaler 12/26/2023 at AM Self No Yes   Sig: USE 1 INHALATION BY MOUTH EVERY  12 HOURS   Patient taking differently: Inhale 1 puff into the lungs daily   furosemide (LASIX) 20 MG tablet 12/26/2023 at PM Self No Yes   Sig: Take 1 tablet (20 mg) by mouth daily   glimepiride (AMARYL) 2 MG tablet 12/26/2023 at PM Self No Yes   Sig: Take 1 tablet (2 mg) by mouth 2 times daily (before meals)   metoprolol tartrate (LOPRESSOR) 25 MG tablet 12/26/2023 at PM Self No Yes   Sig: Take 1 tablet (25 mg) by mouth 2 times daily   nitroGLYcerin (NITROSTAT) 0.4 MG sublingual tablet  at PRN Self No Yes   Sig: FOR CHEST PAIN PLACE 1 TABLET  UNDER TONGUE EVERY 5 MINUTES FOR 3 DOSES. IF SYMPTOMS PERSIST 5  MINUTES AFTER 1ST DOSE CALL 911   pantoprazole (PROTONIX) 20 MG EC tablet 12/26/2023 at AM Self No Yes   Sig: Take 1 tablet (20 mg) by mouth daily      Facility-Administered Medications: None     Current Facility-Administered Medications   Medication Dose Route Frequency    acetaminophen  1,000 mg Oral BID    apixaban ANTICOAGULANT  2.5 mg Oral BID    atorvastatin  20 mg Oral Daily    buPROPion  100 mg Oral Daily    clopidogrel  75 mg Oral Daily    DULoxetine  60 mg Oral Daily    fexofenadine  180 mg Oral QPM    fluticasone-vilanterol  1 puff Inhalation Daily    furosemide  40 mg Intravenous Q8H    insulin aspart  1-7 Units Subcutaneous TID AC    insulin aspart  1-5 Units Subcutaneous At Bedtime    metoprolol tartrate  50 mg Oral BID    miconazole   Topical BID    pantoprazole  20 mg Oral Daily    sodium chloride (PF)  3 mL Intracatheter Q8H    sodium chloride (PF)  3 mL Intracatheter Q8H     Current Facility-Administered Medications   Medication Last Rate    Continuing ACE inhibitor/ARB/ARNI from home medication list OR ACE inhibitor/ARB order already placed during this visit      - MEDICATION INSTRUCTIONS -      dilTIAZem Stopped (12/28/23 1020)     Allergies   Allergies   Allergen  "Reactions    Aspirin Hives     Reaction occurred during childhood.     Lisinopril Cough    Losartan      Hyperkalemia      Metformin      Elevated lactic acid    Minocycline      Yellow Dye Allergy. Minocycline has Yellow Dye #10.    Mounjaro [Tirzepatide] Diarrhea and GI Disturbance    Salicylates Hives    Yellow Dye Hives     Rxn to yellow tablet. Eyes swelled shut.     Yellow Dyes (Non-Tartrazine) Hives       Social History    reports that she quit smoking about 50 years ago. Her smoking use included cigarettes. She started smoking about 52 years ago. She has a 0.3 pack-year smoking history. She has never used smokeless tobacco. She reports current alcohol use. She reports that she does not use drugs.  Family History   I have reviewed this patient's family history and updated it with pertinent information if needed.  Family History   Problem Relation Age of Onset    Neurologic Disorder Mother         MS - at 60's    C.A.D. Father          at 8o's, ? prostate ca    Breast Cancer No family hx of     Cancer - colorectal No family hx of           Review of Systems   A comprehensive review of system was performed and is negative other than that noted in the HPI or here.     Physical Exam   Vital Signs with Ranges  Temp:  [96.8  F (36  C)-98.5  F (36.9  C)] 97.6  F (36.4  C)  Pulse:  [] 66  Resp:  [12-57] 12  BP: (106-149)/() 118/81  SpO2:  [90 %-100 %] 94 %  Wt Readings from Last 4 Encounters:   23 120.6 kg (265 lb 12.8 oz)   23 125.8 kg (277 lb 4.8 oz)   23 127.8 kg (281 lb 12.8 oz)   23 126 kg (277 lb 12.5 oz)     I/O last 3 completed shifts:  In: 200 [P.O.:200]  Out: -       Vitals: /81   Pulse 66   Temp 97.6  F (36.4  C) (Oral)   Resp 12   Ht 1.702 m (5' 7\")   Wt 120.6 kg (265 lb 12.8 oz)   SpO2 94%   BMI 41.63 kg/m      Physical Exam:   General - Alert and oriented to time place and person in no acute distress  Eyes - No scleral icterus  HEENT - Neck " "supple, moist mucous membranes  Cardiovascular -irregular rhythm, systolic murmur  Extremities - There is trace right lower EXTR edema  Respiratory -mild basal crackles  Skin - No pallor or cyanosis  Gastrointestinal - Non tender and non distended without rebound or guarding  Psych - Appropriate affect   Neurological - No gross motor neurological focal deficits    No lab results found in last 7 days.    Invalid input(s): \"TROPONINIES\"    Recent Labs   Lab 12/28/23  1324 12/28/23  0548 12/28/23  0134 12/27/23  1556   WBC  --  10.4  --  10.0   HGB  --  12.5  --  12.9   MCV  --  87  --  85   PLT  --  290  --  323   NA  --  139  --  141   POTASSIUM  --  4.3  --  5.0   CHLORIDE  --  103  --  105   CO2  --  25  --  23   BUN  --  31.2*  --  31.9*   CR  --  1.59*  --  1.62*   GFRESTIMATED  --  31*  --  30*   ANIONGAP  --  11  --  13   TELLY  --  9.2  --  9.4   * 136* 120* 184*   ALBUMIN  --   --   --  4.0   PROTTOTAL  --   --   --  7.5   BILITOTAL  --   --   --  0.4   ALKPHOS  --   --   --  85   ALT  --   --   --  10   AST  --   --   --  14     Recent Labs   Lab Test 06/26/23  1421 10/24/22  1245 02/09/16  0809 10/13/15  1008   CHOL 140 145   < > 126   HDL 45* 44*   < > 54   LDL 57 52   < > 44   TRIG 189* 246*   < > 140   CHOLHDLRATIO  --   --   --  2.3    < > = values in this interval not displayed.     Recent Labs   Lab 12/28/23  0548 12/27/23  1556   WBC 10.4 10.0   HGB 12.5 12.9   HCT 41.9 42.8   MCV 87 85    323     No results for input(s): \"PH\", \"PHV\", \"PO2\", \"PO2V\", \"SAT\", \"PCO2\", \"PCO2V\", \"HCO3\", \"HCO3V\" in the last 168 hours.  Recent Labs   Lab 12/27/23  1556   NTBNPI 4,160*     No results for input(s): \"DD\" in the last 168 hours.  No results for input(s): \"SED\", \"CRP\" in the last 168 hours.  Recent Labs   Lab 12/28/23  0548 12/27/23  1556    323     No results for input(s): \"TSH\" in the last 168 hours.  No results for input(s): \"COLOR\", \"APPEARANCE\", \"URINEGLC\", \"URINEBILI\", \"URINEKETONE\", \"SG\", " "\"UBLD\", \"URINEPH\", \"PROTEIN\", \"UROBILINOGEN\", \"NITRITE\", \"LEUKEST\", \"RBCU\", \"WBCU\" in the last 168 hours.    Imaging:  Recent Results (from the past 48 hour(s))   Chest XR,  PA & LAT    Narrative    EXAM: XR CHEST 2 VIEWS  LOCATION: [facility]  DATE: 12/27/2023    INDICATION: Shortness of breath.  COMPARISON: 10/10/2023.      Impression    IMPRESSION: Mild cardiac silhouette enlargement, small pleural effusions and vascular indistinctness suggests pulmonary edema and CHF. Mild basilar atelectasis. Aortic atherosclerosis.         Echo:  No results found for this or any previous visit (from the past 4320 hour(s)).    Clinically Significant Risk Factors Present on Admission               # Drug Induced Coagulation Defect: home medication list includes an anticoagulant medication  # Drug Induced Platelet Defect: home medication list includes an antiplatelet medication   # Hypertension: Noted on problem list     # DMII: A1C = 7.9 % (Ref range: 0.0 - 5.6 %) within past 6 months    # Severe Obesity: Estimated body mass index is 41.63 kg/m  as calculated from the following:    Height as of this encounter: 1.702 m (5' 7\").    Weight as of this encounter: 120.6 kg (265 lb 12.8 oz).       # Financial/Environmental Concerns: none  # Asthma: noted on problem list       Cardiac Arrhythmia: Atrial fibrillation: Paroxysmal        Fluid overload, unspecified                                           "

## 2023-12-28 NOTE — PROGRESS NOTES
Physical Therapy: Orders received. Chart reviewed and discussed with care team.? Physical Therapy not indicated due to patient mobilizing well per OT evaluation, see OT eval for details.? Defer discharge recommendations to OT.? Will complete orders.

## 2023-12-28 NOTE — ED NOTES
[facility]  ED Nurse Handoff Report    ED Chief complaint: Shortness of Breath      ED Diagnosis:   Final diagnoses:   Atrial fibrillation with rapid ventricular response (H)       Code Status: Full Code    Allergies:   Allergies   Allergen Reactions    Aspirin Hives     Reaction occurred during childhood.     Lisinopril Cough    Losartan      Hyperkalemia      Metformin      Elevated lactic acid    Minocycline      Yellow Dye Allergy. Minocycline has Yellow Dye #10.    Mounjaro [Tirzepatide] Diarrhea and GI Disturbance    Salicylates Hives    Yellow Dye Hives     Rxn to yellow tablet. Eyes swelled shut.     Yellow Dyes (Non-Tartrazine) Hives       Patient Story: Shortness of breath increasing. Pt states she has had afib in the past.     Focused Assessment:  Patient presents to the ER with complaints of SOB, which is similar to when she had previous episodes of flipping into Afib. Patient is calm and cooperative in the ER. SBA    Treatments and/or interventions provided:   Chest XR,  PA & LAT   Final Result   IMPRESSION: Mild cardiac silhouette enlargement, small pleural effusions and vascular indistinctness suggests pulmonary edema and CHF. Mild basilar atelectasis. Aortic atherosclerosis.            Labs Ordered and Resulted from Time of ED Arrival to Time of ED Departure   COMPREHENSIVE METABOLIC PANEL - Abnormal       Result Value    Sodium 141      Potassium 5.0      Carbon Dioxide (CO2) 23      Anion Gap 13      Urea Nitrogen 31.9 (*)     Creatinine 1.62 (*)     GFR Estimate 30 (*)     Calcium 9.4      Chloride 105      Glucose 184 (*)     Alkaline Phosphatase 85      AST 14      ALT 10      Protein Total 7.5      Albumin 4.0      Bilirubin Total 0.4     TROPONIN T, HIGH SENSITIVITY - Abnormal    Troponin T, High Sensitivity 33 (*)    CBC WITH PLATELETS AND DIFFERENTIAL - Abnormal    WBC Count 10.0      RBC Count 5.02      Hemoglobin 12.9      Hematocrit 42.8      MCV 85      MCH 25.7 (*)      MCHC 30.1 (*)     RDW 16.0 (*)     Platelet Count 323      % Neutrophils 74      % Lymphocytes 17      % Monocytes 7      % Eosinophils 1      % Basophils 1      % Immature Granulocytes 0      NRBCs per 100 WBC 0      Absolute Neutrophils 7.4      Absolute Lymphocytes 1.7      Absolute Monocytes 0.7      Absolute Eosinophils 0.1      Absolute Basophils 0.1      Absolute Immature Granulocytes 0.0      Absolute NRBCs 0.0        Does this patient have any cognitive concerns?:  None    Activity level - Baseline/Home:  Independent  Activity Level - Current:   Stand with assist x2    Patient's Preferred language: English   Needed?: No    Isolation: None  Infection: Not Applicable  Patient tested for COVID 19 prior to admission: YES  Bariatric?: No    Vital Signs:   Vitals:    12/27/23 1822 12/27/23 1833 12/27/23 1930 12/27/23 1945   BP: 119/78 132/87 128/77 106/83   Pulse: 65 74 90 75   Resp: 21 14 21 25   Temp:       TempSrc:       SpO2:  94% 90% 90%   Weight:       Height:           Was the PSS-3 completed:   Yes  Family Comments: Patient updated family appropriately   OBS brochure/video discussed/provided to patient/family: No    For the majority of the shift this patient's behavior was Green.   Behavioral interventions performed were Care explained.    ED NURSE PHONE NUMBER: [phone]

## 2023-12-28 NOTE — H&P
[facility]    History and Physical  Hospitalist       Date of Admission:  12/27/2023    Assessment & Plan   This is a 89-year-old female with history of asthma, mild chronic kidney disease stage III, heart failure with preserved ejection fraction, diabetes mellitus type 2, hypertension, coronary artery disease, rheumatoid arthritis, obstructive sleep apnea not currently using CPAP, TIA, hyperlipidemia, GERD, atrial fibrillation on chronic anticoagulation with Eliquis come to the ER with complaint of worsening shortness of breath for the last couple of weeks.      Atrial fibrillation with RVR  Chronic anticoagulation with Eliquis  This is a 89-year-old female with multiple comorbidities as mentioned above, found to be in A-fib with RVR with a heart rate of 142 on admission, she is started on IV Cardizem infusion the heart rate is now much better controlled between 70 to 90s.  She is on low-dose of metoprolol 25 mg twice daily, but she has been taking just once a day.  At this time I will increase the dose of metoprolol to 50 mg twice daily, most likely need to change to metoprolol succinate 50 mg daily once heart rate is controlled, will try to wean her off from the Cardizem infusion.  Continue with Eliquis 2.5 mg twice daily for anticoagulation.    Acute on chronic diastolic congestive heart failure/HFpEF  Moderate aortic stenosis  Moderate mitral stenosis  Patient with worsening shortness of breath, dyspnea on exertion, orthopnea, PND lower extremity edema  Chest x-ray consistent with pulmonary edema, small pleural effusions as well.  I will check BNP.  Her worsening CHF can be secondary to A-fib with RVR or valvular abnormalities.  Recent echocardiogram shows EF of 55% with moderate mitral and moderate valvular aortic stenosis  At this time we will repeat the echocardiogram, do serial troponins  Started on IV Lasix 40 mg every 8 hours  Strict intake and output charting Daily  weights  Consult cardiology to evaluate the patient as well.    Coronary artery disease  NSTEMI type II  Hypertension  Hyperlipidemia  Patient history of coronary artery disease status post stent in the past, she is on Eliquis as well as Plavix.  Stents were done many years ago.  She is not on aspirin because of allergy.  I think now she is on Eliquis and the Plavix can be discontinued will defer this to her PCP or cardiologist.  Her mildly elevated troponin is secondary to A-fib with RVR and CHF and likely demand ischemia rather than ACS at this time.  She has no chest pain.  At this time I will continue metoprolol increase the dose to 50 mg twice daily, continue Lipitor 20 mg daily Plavix 75 mg daily.  Blood pressure is in good control    Diabetes mellitus type 2  She is on glimepiride at home, I will hold the glimepiride for now  I will keep her on sliding scale insulin for correction hypoglycemia protocol.  Keep her on moderate carb consistent diet    Chronic kidney disease stage III  Her baseline creatinine is between 1.6-1.8.  Her creatinine is a her baseline, keep on IV Lasix 40 mg every 8 hours    Obstructive sleep apnea  She does have a CPAP, but not using as there is a recall.  Will keep her on CPAP at home settings while she is in the hospital.    History of asthma  No acute exacerbation at this time, will keep her on her PTA inhalers  Nebulization as needed.    History of anxiety and depression  Continue Wellbutrin and Cymbalta.    GERD.    -Continue home medications, on Protonix we will continue with that.    DVT Prophylaxis: Eliquis  Code Status: Full Code    Disposition: Expected discharge in 2 days once Stable.    Discussed with the patient, ED physician and the nursing staff during care of the patient.    Nick Daniels MD, MD    Primary Care Physician   Maryan Churchill    Chief Complaint   Shortness of breath    History is obtained from the patient    History of Present Illness   This is a  89-year-old female with history of asthma, mild chronic kidney disease stage III, heart failure with preserved ejection fraction, diabetes mellitus type 2, hypertension, coronary artery disease, rheumatoid arthritis, obstructive sleep apnea not currently using CPAP, TIA, hyperlipidemia, GERD, atrial fibrillation on chronic anticoagulation with Eliquis come to the ER with complaint of worsening shortness of breath for the last couple of weeks.    According to the patient she was in the TCU about a week or 2 ago, come to home, and since then she has been noticing that her shortness of breath has been getting worse, she is struggling with breathing on exertion for the last 1 week or so, has been getting worse to the point that she is not able to do do anything, last night when she was sleeping she woke up at night with complaint of shortness of breath, she had to sit up to breathe, she does have hard time laying down flat, she is breathing easier when sitting up.  She also noticed some funny feeling in chest, which she pointed towards palpitation, she was recently diagnosed with A-fib during her recent admission to the hospital in October, she also noticed swelling in the legs, feeling weak tired and fatigue and unable to do anything to come to the ED for evaluation.    She denies any fever chills chest pain headache dizziness lightheadedness no abdominal pain back pain dysuria hematuria constipation diarrhea at this time.    In the ED she was found to be in A-fib with RVR she was started on IV Cardizem infusion and the hospital service consulted to admit the patient.    Past Medical History    I have reviewed this patient's medical history and updated it with pertinent information if needed.   Past Medical History:   Diagnosis Date    Aortic valve sclerosis     heart murmur, no AS    Arrhythmia     PAT, PVC    Aspirin allergy     Plavix use long term    Asthma     CKD (chronic kidney disease) stage 3, GFR 30-59 ml/min  (H)     x 2007 atleast    Congestive heart failure, unspecified     Depression     Diabetes mellitus (H) 2010    Diastolic dysfunction, left ventricle 2013    grade 2, nl ef    HTN (hypertension)     Lactic acidosis 08/2018    due to dehydration and metformin    Migraine headache     Mitral stenosis     mild, 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    Myocardial infarction (H) 9/2007, cath 2013 ml    BMS: stent to OM, diag, nl EF, echo /C angia 2013 , f/u cath no lesion >40%    Nephrolithiasis     right side    OA (osteoarthritis) of knee     Obesity     Rheumatoid arthritis flare (H)     prednisone    Sleep apnea     restarted using cpap 2017    TIA (transient ischaemic attack)     Ventral hernia, unspecified, without mention of obstruction or gangrene        Past Surgical History   I have reviewed this patient's surgical history and updated it with pertinent information if needed.  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 Taking?   DULoxetine (CYMBALTA) 60 MG capsule 12/26/2023 at PM Self No Yes   Sig: Take 1 capsule (60 mg) by mouth daily   Menthol (Topical Analgesic) 7.5 % (Roll) MISC 12/26/2023 Self Yes Yes   Sig: Apply to affected area every morning   Multiple Vitamins-Minerals (HAIR SKIN AND NAILS FORMULA PO) 12/26/2023 at PM Self Yes Yes   Sig: Take 1 tablet by mouth daily    Vitamin D3 (CHOLECALCIFEROL) 25 mcg (1000 units) tablet 12/26/2023 at PM Self Yes Yes   Sig: Take 25 mcg by mouth daily   acetaminophen (TYLENOL) 500 MG tablet 12/26/2023 at PM Self Yes Yes   Sig: Take 1,000 mg by mouth 2 times daily   albuterol (PROAIR HFA/PROVENTIL HFA/VENTOLIN HFA) 108 (90 Base) MCG/ACT inhaler 12/27/2023 Self Yes Yes   Sig: Inhale 2 puffs into the lungs every 6 hours as needed for shortness of breath, wheezing or cough For shortness of breath   apixaban ANTICOAGULANT (ELIQUIS) 2.5 MG tablet 12/26/2023 at PM Self No Yes   Sig: Take 1 tablet (2.5 mg) by mouth 2 times daily   atorvastatin (LIPITOR) 20 MG tablet 12/26/2023 at PM Self No Yes   Sig: TAKE 1 TABLET BY MOUTH DAILY FOR CHOLESTEROL   buPROPion (WELLBUTRIN SR) 100 MG 12 hr tablet 12/26/2023 at PM Self No Yes   Sig: Take 1 tablet (100 mg) by mouth daily for mood   calcium carbonate (TUMS) 500 MG chewable tablet  at PRN Self Yes Yes   Sig: Take 1 chew tab by mouth 2 times daily as needed for heartburn   camphor-menthol-methyl salicylate 3.1-6-10 % PTCH  at PRN Self No Yes   Sig: Apply 1 patch topically as needed (hand pain) Or back pain   clopidogrel (PLAVIX) 75 MG tablet 12/26/2023 at PM Self No Yes   Sig: TAKE 1 TABLET BY MOUTH DAILY   fexofenadine (ALLEGRA) 180 MG tablet 12/26/2023 at PM Self Yes Yes   Sig: Take 180 mg by mouth every evening   fluticasone (FLONASE) 50 MCG/ACT nasal spray  at PRN Self Yes No   Sig: Spray 1 spray into both nostrils daily as needed    fluticasone-salmeterol (WIXELA INHUB) 100-50 MCG/ACT inhaler 12/26/2023 at AM Self No Yes   Sig: USE 1 INHALATION BY MOUTH EVERY  12 HOURS   Patient taking differently: Inhale 1 puff into the lungs daily   furosemide (LASIX) 20 MG tablet 12/26/2023 at PM Self No Yes   Sig: Take 1 tablet (20 mg) by mouth daily   glimepiride (AMARYL) 2 MG tablet 12/26/2023 at PM Self No Yes   Sig: Take 1 tablet (2 mg) by mouth 2 times daily (before meals)   metoprolol tartrate (LOPRESSOR) 25 MG  tablet 2023 at PM Self No Yes   Sig: Take 1 tablet (25 mg) by mouth 2 times daily   nitroGLYcerin (NITROSTAT) 0.4 MG sublingual tablet  at PRN Self No Yes   Sig: FOR CHEST PAIN PLACE 1 TABLET  UNDER TONGUE EVERY 5 MINUTES FOR 3 DOSES. IF SYMPTOMS PERSIST 5  MINUTES AFTER 1ST DOSE CALL 911   pantoprazole (PROTONIX) 20 MG EC tablet 2023 at AM Self No Yes   Sig: Take 1 tablet (20 mg) by mouth daily      Facility-Administered Medications: None     Allergies   Allergies   Allergen Reactions    Aspirin Hives     Reaction occurred during childhood.     Lisinopril Cough    Losartan      Hyperkalemia      Metformin      Elevated lactic acid    Minocycline      Yellow Dye Allergy. Minocycline has Yellow Dye #10.    Mounjaro [Tirzepatide] Diarrhea and GI Disturbance    Salicylates Hives    Yellow Dye Hives     Rxn to yellow tablet. Eyes swelled shut.     Yellow Dyes (Non-Tartrazine) Hives       Social History   I have reviewed this patient's social history and updated it with pertinent information if needed. Moira Andre  reports that she quit smoking about 50 years ago. Her smoking use included cigarettes. She started smoking about 52 years ago. She has a 0.3 pack-year smoking history. She has never used smokeless tobacco. She reports current alcohol use. She reports that she does not use drugs.    Family History   I have reviewed this patient's family history and updated it with pertinent information if needed.   Family History   Problem Relation Age of Onset    Neurologic Disorder Mother         MS - at 60's    C.A.D. Father          at 8o's, ? prostate ca    Breast Cancer No family hx of     Cancer - colorectal No family hx of        Review of Systems   CONSTITUTIONAL:  positive for  fatigue and malaise  EYES:  negative  HEENT:  negative  RESPIRATORY:  positive for  dyspnea  CARDIOVASCULAR:  positive for  dyspnea, orthopnea, PND, edema  GASTROINTESTINAL:  negative  GENITOURINARY:   negative  INTEGUMENT/BREAST:  negative  HEMATOLOGIC/LYMPHATIC:  negative  ALLERGIC/IMMUNOLOGIC:  negative  ENDOCRINE:  negative  MUSCULOSKELETAL:  negative  NEUROLOGICAL:  negative  BEHAVIOR/PSYCH:  negative    Physical Exam   Temp: 98.3  F (36.8  C) Temp src: Oral BP: 106/83 Pulse: 75   Resp: 25 SpO2: 90 % O2 Device: None (Room air)    Vital Signs with Ranges  Temp:  [96.8  F (36  C)-98.3  F (36.8  C)] 98.3  F (36.8  C)  Pulse:  [] 75  Resp:  [14-57] 25  BP: (106-146)/() 106/83  SpO2:  [90 %-94 %] 90 %  270 lbs 0 oz    Constitutional: Awake, alert, cooperative, mild respiratory distress.  Eyes: Conjunctiva and pupils examined and normal.  HEENT: Moist mucous membranes, normal dentition.  Respiratory: Diminished air entry bilaterally, basal crackles positive no obvious wheezing  Cardiovascular: Irregularly irregular rhythm, normal S1 and S2, and systolic murmur positive.  GI: Soft, non-distended, non-tender, normal bowel sounds.  Lymph/Hematologic: No anterior cervical or supraclavicular adenopathy.  Skin: No rashes, no cyanosis, 1-2+ lower extremity edema.  Musculoskeletal: No joint swelling, erythema or tenderness.  Neurologic: Cranial nerves 2-12 intact, normal strength and sensation.  Psychiatric: Alert, oriented to person, place and time, no obvious anxiety or depression.    Data   Data reviewed today:  I personally reviewed the EKG tracing showing A-fib with RVR  Recent Labs   Lab 12/27/23  1556   WBC 10.0   HGB 12.9   MCV 85         POTASSIUM 5.0   CHLORIDE 105   CO2 23   BUN 31.9*   CR 1.62*   ANIONGAP 13   TELLY 9.4   *   ALBUMIN 4.0   PROTTOTAL 7.5   BILITOTAL 0.4   ALKPHOS 85   ALT 10   AST 14       Recent Results (from the past 24 hour(s))   Chest XR,  PA & LAT    Narrative    EXAM: XR CHEST 2 VIEWS  LOCATION: [facility]  DATE: 12/27/2023    INDICATION: Shortness of breath.  COMPARISON: 10/10/2023.      Impression    IMPRESSION: Mild cardiac  silhouette enlargement, small pleural effusions and vascular indistinctness suggests pulmonary edema and CHF. Mild basilar atelectasis. Aortic atherosclerosis.

## 2023-12-28 NOTE — ED NOTES
Assumed care for patient. Patient resting in bed with Dilt drip running. Patient dropped her bag on the floor in her room and then attempted to get out of bed to pick it up off of the ground. When attempting to get up, patient complained of increased shortness of breath and work of breathing. Placed on 2 liters nasal cannula for patient comfort. Once oxygen was applied patient stated she is more comfortable than she has been prior. Denies nay further needs at this time

## 2023-12-28 NOTE — PROGRESS NOTES
"   12/28/23 0900   Appointment Info   Signing Clinician's Name / Credentials (OT) Magda Villanueva, OTR/L   Living Environment   People in Home alone   Current Living Arrangements apartment   Home Accessibility no concerns   Transportation Anticipated family or friend will provide   Living Environment Comments [place] apt w/cat   Self-Care   Usual Activity Tolerance moderate   Current Activity Tolerance fair   Regular Exercise No   Equipment Currently Used at Home walker, rolling   Fall history within last six months no   Activity/Exercise/Self-Care Comment Mod I with amb using 4ww (at all times in/outside apt), Indep all ADL, IADL including driving, meds, meal prep.   General Information   Onset of Illness/Injury or Date of Surgery 12/27/23   Referring Physician Dr. Nick Daniels   Patient/Family Therapy Goal Statement (OT) return home   Additional Occupational Profile Info/Pertinent History of Current Problem 89yo female admitted with worsening SOB and \"palpations\" found to be Afib w/RVR, Type II NSTEMI due to acute on chronic CHF. See H&P for full report and history including recent admit.   Existing Precautions/Restrictions fall;oxygen therapy device and L/min  (1.5L via NC, @ 94% at rest)   Limitations/Impairments hearing  (Wears B hearing aids so communicates effectively)   Cognitive Status Examination   Orientation Status orientation to person, place and time   Affect/Mental Status (Cognitive) WNL   Follows Commands WFL   Visual Perception   Visual Impairment/Limitations WFL;corrective lenses full-time;corrective lenses for distance   Pain Assessment   Patient Currently in Pain No   Range of Motion Comprehensive   General Range of Motion no range of motion deficits identified   Coordination   Upper Extremity Coordination No deficits were identified   Bed Mobility   Comment (Bed Mobility) Supine to sit SBA with heavy reliance on R bed rail   Transfers   Transfers sit-stand transfer;bed-chair transfer;toilet transfer "   Transfer Skill: Bed to Chair/Chair to Bed   Bed-Chair [place] (Transfers) supervision   Assistive Device (Bed-Chair Transfers) rolling walker  (Her own 4ww)   Sit-Stand Transfer   Sit-Stand [place] (Transfers) supervision   Assistive Device (Sit-Stand Transfers) walker, 4-wheeled   Toilet Transfer   [place] Level (Toilet Transfer) not tested  (due to limitations in room)   Toilet Transfer Comments Will need to be addressed as pt performs indep at home   Balance   Balance Comments Indep static and dynamic sitting balance, SBA short distance amb in room with walker and no LOB noted including on turns   Activities of Daily Living   BADL Assessment/Intervention lower body dressing;toileting;grooming   Lower Body Dressing Assessment/Training   Position (Lower Body Dressing) edge of bed sitting  (bed low to floor)   [place] Level (Lower Body Dressing) set up;minimum assist (75% patient effort)   Grooming Assessment/Training   Position (Grooming) supported sitting  (in chair)   [place] Level (Grooming) set up   Toileting   Comment, (Toileting) Pt with Rosanna, inBladder incontinence at baseline wears depends   [place] Level (Toileting) dependent (less than 25% patient effort)   Clinical Impression   Criteria for Skilled Therapeutic Interventions Met (OT) Yes, treatment indicated   OT Diagnosis decline in ADL/IADL performance   OT Problem List-Impairments impacting ADL problems related to;activity tolerance impaired   ADL comments/analysis Pt's current work of breathing/activity intolerance affecting ability to perform basic self-cares at baseline level such as standing to wash face and brush teeth, stand to make meal, retrieve mail from [facility]   Assessment of Occupational Performance 3-5 Performance Deficits   Identified Performance Deficits dressing, grooming, bathing, toileting, HH mgmt, meal prep   Planned Therapy Interventions (OT) home program guidelines;progressive  activity/exercise;risk factor education;ADL retraining;IADL retraining   Clinical Decision Making Complexity (OT) problem focused assessment/low complexity   Risk & Benefits of therapy have been explained evaluation/treatment results reviewed;care plan/treatment goals reviewed;risks/benefits reviewed;current/potential barriers reviewed;participants voiced agreement with care plan;participants included;patient   OT Total Evaluation Time   OT Eval, Low Complexity Minutes (16452) 12   OT Goals   Therapy Frequency (OT) Daily   OT Predicted Duration/Target Date for Goal Attainment 23   OT Goals Hygiene/Grooming;Lower Body Dressing;Toilet Transfer/Toileting;Transfers;Cardiac Phase 1;OT Goal 1   Interventions   Interventions Quick Adds Self-Care/Home Management;Therapeutic Procedures/Exercise;Therapeutic Activity;Cardiac Rehab   Self-Care/Home Management   Self-Care/Home Mgmt/ADL, Compensatory, Meal Prep Minutes (75816) 24   Symptoms Noted During/After Treatment (Meal Preparation/Planning Training) fatigue   Treatment Detail/Skilled Intervention OT: pt in bed upon arrival and agreeable to participation. 02 94% on 1.5L at rest, HR 84bpm. Pt A & O to self, , full date, place, POTUS and followed directives consistently, conversed approrpriately. Supine to sit used R bed rail and w/increased effort Mod I. Needed several rest breaks after each movement/task due to SOB although she said much improved since admit and her 02 did not drop below 90% and HR range maintainted between 91-99bpm throughout session and activities. Seated EOB pt attempted to don socks lifting L leg over R knee to figure 4 position with increased effort to don L sock but was SOB at completion and unable to complete R requiring Max A. She then reported that she does not wear socks at home instead slipping feet into her slip-on shoes. She has tried a sock aid in past but quit using it. OT suggested trial again of all dressing tools and pt open to  "consideration and does own some. Pt able to don robe around back with A to manage lines/tubing only. Pt usually dresses from a bench at end of bed which is higher than hospital bed. OT increased height of bed to that pt reported hers is at home and she performed sit-stand SBA but needed cueing to lock brakes on her 4ww. Amb to sink and then to recliner chair SBA, no LOB on turns, A with lines only. Stand to sit vc's only again for brake mgmt and reach back to chair arms versus relying on walker for support.  Initiated education in diet modification as pt reported she has mostly been making \"TV dinners\". Instructed her in need to monitor sodium in diet to help prevent future exacerbations of CHF symptoms and pt did admit to being informed of that before. Pt positioned in chair with feet up, menu provided to order breakfast and VS taken - see flowsheet.   Cardiac Education   Education Provided Diet;Diagnosis   Education Packet Given to Patient No   All Patient Education Handouts Reviewed with Patient and/or Family No   OT Discharge Planning   OT Plan CHF educ pkt and re-educ in diet modification, daily wts etc w/Stop tool,  increase amb with 4ww   OT Discharge Recommendation (DC Rec) home with home care occupational therapy   OT Rationale for DC Rec Anticipate with continued medical care and therapy pt will continue to progress toward baseline to be able to return home, however, she would benefit from HH aide for bathing initially and home OT and PT to progress activity tolerance/strength and maximize home/environmental safety. She may be open under HH care already from previous admission??   OT Brief overview of current status Mod I bed mob, SBA sit<>stand and amb in room with her 4ww, 02 1.5L maintained between 91-94%, HR 90-99bpm throughout.   Total Session Time   Timed Code Treatment Minutes 24   Total Session Time (sum of timed and untimed services) 36     "

## 2023-12-28 NOTE — ED PROVIDER NOTES
History     Chief Complaint:  Shortness of Breath       HPI   Moira Andre is a 89 year old female who presents to the emergency department complaining of increasing shortness of breath dyspnea on exertion in particular noting that her heart has been going very fast on and off over the course of the last day she does have a history of coronary disease atrial fibrillation and is on Eliquis she is on a beta-blocker for rate control but tells me she also suffers from asthma I do not see a calcium blocker.  She denies chest pain.      Independent Historian:    Patient    Review of External Notes:  None    Medications:    acetaminophen (TYLENOL) 500 MG tablet  albuterol (PROAIR HFA/PROVENTIL HFA/VENTOLIN HFA) 108 (90 Base) MCG/ACT inhaler  apixaban ANTICOAGULANT (ELIQUIS) 2.5 MG tablet  atorvastatin (LIPITOR) 20 MG tablet  buPROPion (WELLBUTRIN SR) 100 MG 12 hr tablet  calcium carbonate (TUMS) 500 MG chewable tablet  camphor-menthol-methyl salicylate 3.1-6-10 % PTCH  clopidogrel (PLAVIX) 75 MG tablet  DULoxetine (CYMBALTA) 60 MG capsule  fexofenadine (ALLEGRA) 180 MG tablet  fluticasone-salmeterol (WIXELA INHUB) 100-50 MCG/ACT inhaler  furosemide (LASIX) 20 MG tablet  glimepiride (AMARYL) 2 MG tablet  Menthol (Topical Analgesic) 7.5 % (Roll) MISC  metoprolol tartrate (LOPRESSOR) 25 MG tablet  Multiple Vitamins-Minerals (HAIR SKIN AND NAILS FORMULA PO)  nitroGLYcerin (NITROSTAT) 0.4 MG sublingual tablet  pantoprazole (PROTONIX) 20 MG EC tablet  Vitamin D3 (CHOLECALCIFEROL) 25 mcg (1000 units) tablet  fluticasone (FLONASE) 50 MCG/ACT nasal spray        Past Medical History:    Past Medical History:   Diagnosis Date    Aortic valve sclerosis     Arrhythmia     Aspirin allergy     Asthma     CKD (chronic kidney disease) stage 3, GFR 30-59 ml/min (H)     Congestive heart failure, unspecified     Depression     Diabetes mellitus (H) 2010    Diastolic dysfunction, left ventricle 2013    HTN (hypertension)     Lactic  "acidosis 08/2018    Migraine headache     Mitral stenosis     Myocardial infarction (H) 9/2007, cath 2013 ml    Nephrolithiasis     OA (osteoarthritis) of knee     Obesity     Rheumatoid arthritis flare (H)     Sleep apnea     TIA (transient ischaemic attack)     Ventral hernia, unspecified, without mention of obstruction or gangrene        Past Surgical History:    Past Surgical History:   Procedure Laterality Date    APPENDECTOMY      BIOPSY BREAST      x2 -needle & lumpectomy-benign    CHOLECYSTECTOMY      CORONARY ANGIOGRAPHY ADULT ORDER  9/28/2007    Bare metal stent to OM1, Diagonal patent     CORONARY ANGIOGRAPHY ADULT ORDER  9/25/2007    Pettus stent to Diagonal    HC LEFT HEART CATHETERIZATION  8/2013    Moderate CAD    HYSTERECTOMY TOTAL ABDOMINAL      ORTHOPEDIC SURGERY      knee replacement on right side (2006), Left side (2016)    RELEASE CARPAL TUNNEL      right and left    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          Physical Exam   Patient Vitals for the past 24 hrs:   BP Temp Temp src Pulse Resp SpO2 Height Weight   12/27/23 2030 112/56 -- -- 104 23 98 % -- --   12/27/23 1945 106/83 -- -- 75 25 90 % -- --   12/27/23 1930 128/77 -- -- 90 21 90 % -- --   12/27/23 1833 132/87 -- -- 74 14 94 % -- --   12/27/23 1822 119/78 -- -- 65 21 -- -- --   12/27/23 1807 -- -- -- 58 (!) 57 90 % -- --   12/27/23 1752 -- -- -- -- -- 90 % -- --   12/27/23 1737 -- -- -- 101 (!) 33 92 % -- --   12/27/23 1730 (!) 119/95 -- -- 103 14 91 % -- --   12/27/23 1720 -- -- -- 118 24 92 % -- --   12/27/23 1715 (!) 127/98 -- -- 102 (!) 37 92 % -- --   12/27/23 1657 -- -- -- (!) 128 20 93 % -- --   12/27/23 1645 (!) 121/93 -- -- 85 -- 93 % -- --   12/27/23 1639 (!) 137/105 -- -- (!) 125 -- 91 % -- --   12/27/23 1635 -- -- -- -- -- 91 % -- --   12/27/23 1634 -- -- -- -- -- 94 % -- --   12/27/23 1633 -- -- -- -- -- 92 % -- --   12/27/23 1548 -- -- -- -- -- -- 1.702 m (5' 7\") 122.5 kg (270 lb)   12/27/23 1547 138/63 " 98.3  F (36.8  C) Oral 65 16 94 % -- --   12/27/23 1546 (!) 146/89 96.8  F (36  C) Temporal (!) 142 22 -- -- --        Physical Exam  Constitutional: Heavyset white female supine in no respiratory distress  HENT: No signs of trauma.   Eyes: EOM are normal. Pupils are equal, round, and reactive to light.   Neck: Normal range of motion. No JVD present. No cervical adenopathy.  Cardiovascular: Irregularly irregular rapid rhythm.  Exam reveals no gallop and no friction rub.    1/6 systolic murmur left lower sternal border  Pulmonary/Chest: Bilateral breath sounds normal. No wheezes, rhonchi or rales.  Abdominal: Soft. No tenderness. No rebound or guarding.   Musculoskeletal: No edema.  1+ lower extremity tenderness.   Lymphadenopathy: No lymphadenopathy.   Neurological: Alert and oriented to person, place, and time. Normal strength. Coordination normal.   Skin: Skin is warm and dry. No rash noted. No erythema.      Emergency Department Course   ECG  Atrial fibrillation with rapid ventricular response delayed R wave progression  Rate 123 bpm. MT interval N/A ms. QRS duration 94 ms. QT/QTc 362/518 ms. P-R-T axes NA minus 11,56.    Imaging:  Chest XR,  PA & LAT   Final Result   IMPRESSION: Mild cardiac silhouette enlargement, small pleural effusions and vascular indistinctness suggests pulmonary edema and CHF. Mild basilar atelectasis. Aortic atherosclerosis.           Report per radiology    Laboratory:  Labs Ordered and Resulted from Time of ED Arrival to Time of ED Departure   COMPREHENSIVE METABOLIC PANEL - Abnormal       Result Value    Sodium 141      Potassium 5.0      Carbon Dioxide (CO2) 23      Anion Gap 13      Urea Nitrogen 31.9 (*)     Creatinine 1.62 (*)     GFR Estimate 30 (*)     Calcium 9.4      Chloride 105      Glucose 184 (*)     Alkaline Phosphatase 85      AST 14      ALT 10      Protein Total 7.5      Albumin 4.0      Bilirubin Total 0.4     TROPONIN T, HIGH SENSITIVITY - Abnormal    Troponin T, High  Sensitivity 33 (*)    CBC WITH PLATELETS AND DIFFERENTIAL - Abnormal    WBC Count 10.0      RBC Count 5.02      Hemoglobin 12.9      Hematocrit 42.8      MCV 85      MCH 25.7 (*)     MCHC 30.1 (*)     RDW 16.0 (*)     Platelet Count 323      % Neutrophils 74      % Lymphocytes 17      % Monocytes 7      % Eosinophils 1      % Basophils 1      % Immature Granulocytes 0      NRBCs per 100 WBC 0      Absolute Neutrophils 7.4      Absolute Lymphocytes 1.7      Absolute Monocytes 0.7      Absolute Eosinophils 0.1      Absolute Basophils 0.1      Absolute Immature Granulocytes 0.0      Absolute NRBCs 0.0     NT PROBNP INPATIENT - Abnormal    N terminal Pro BNP Inpatient 4,160 (*)         Procedures   None    Emergency Department Course & Assessments:             Interventions:  Medications   diltiazem (CARDIZEM) 125 mg in sodium chloride 0.9 % 125 mL infusion (5 mg/hr Intravenous Rate/Dose Verify 12/27/23 2029)   diltiazem (CARDIZEM) injection 20 mg (20 mg Intravenous $Given 12/27/23 1741)   furosemide (LASIX) injection 20 mg (20 mg Intravenous $Given 12/27/23 2026)        Assessments:  Examined and treated    Independent Interpretation (X-rays, CTs, rhythm strip):  Reviewed chest x-ray    Consultations/Discussion of Management or Tests:  Hospitalist       Social Determinants of Health affecting care:  None     Disposition:  Admit    Impression & Plan    CMS Diagnoses: None    Medical Decision Making:  Patient present with rapid atrial fibrillation and associated dyspnea on exertion.  She has no pneumonia and minimal elevation of her troponin.  Because of her asthma I have started her on a calcium channel blocker and this is controlling her rate and making her feel better.  Also she had not had her Lasix today and was given a dose.  Patient will be admitted for further evaluation and treatment on a diltiazem drip.    Carlo Craig MD    Diagnosis:    ICD-10-CM    1. Atrial fibrillation with rapid ventricular response  (H)  I48.91            Discharge Medications:  New Prescriptions    No medications on file          Carlo Craig MD  12/27/2023   Carlo Craig MD Steinman, Randall Ira, MD  12/27/23 [id number]

## 2023-12-28 NOTE — PROVIDER NOTIFICATION
MD Notification    Notified Person: MD    Notified Person Name: Berna    Notification Date/Time: 12/28/23 5:58 AM    Notification Interaction: Vocera    Purpose of Notification: Can you please enter [facility] orders as patient is on a dilt drip? Thank you. *[id number] MIRZA Chavarria    Orders Received: [facility] order set

## 2023-12-28 NOTE — PLAN OF CARE
Neuro- A/Ox4  Tele/Cardiac- Afib CVR  Resp- on 2L NC, LS clear/diminished  Activity- 1A GB/W  Pain- denies  Drips- dilt @ 10 mg/hr  Drains/Tubes- PIV  Skin- see flowsheet for multiple skin issues  GI/- umbilical hernia; purewick in place for frequency and occasional incontinence  Plan- cards consult, echo, rhythm control    Aura Breen, RN

## 2023-12-29 ENCOUNTER — APPOINTMENT (OUTPATIENT)
Dept: OCCUPATIONAL THERAPY | Facility: CLINIC | Age: 88
DRG: 280 | End: 2023-12-29
Payer: COMMERCIAL

## 2023-12-29 LAB
ANION GAP SERPL CALCULATED.3IONS-SCNC: 13 MMOL/L (ref 7–15)
BUN SERPL-MCNC: 34.8 MG/DL (ref 8–23)
CALCIUM SERPL-MCNC: 9 MG/DL (ref 8.2–9.6)
CHLORIDE SERPL-SCNC: 96 MMOL/L (ref 98–107)
CREAT SERPL-MCNC: 1.84 MG/DL (ref 0.51–0.95)
DEPRECATED HCO3 PLAS-SCNC: 27 MMOL/L (ref 22–29)
EGFRCR SERPLBLD CKD-EPI 2021: 26 ML/MIN/1.73M2
GLUCOSE BLDC GLUCOMTR-MCNC: 112 MG/DL (ref 70–99)
GLUCOSE BLDC GLUCOMTR-MCNC: 126 MG/DL (ref 70–99)
GLUCOSE BLDC GLUCOMTR-MCNC: 140 MG/DL (ref 70–99)
GLUCOSE BLDC GLUCOMTR-MCNC: 148 MG/DL (ref 70–99)
GLUCOSE BLDC GLUCOMTR-MCNC: 183 MG/DL (ref 70–99)
GLUCOSE SERPL-MCNC: 197 MG/DL (ref 70–99)
MAGNESIUM SERPL-MCNC: 1.9 MG/DL (ref 1.7–2.3)
POTASSIUM SERPL-SCNC: 3.9 MMOL/L (ref 3.4–5.3)
SODIUM SERPL-SCNC: 136 MMOL/L (ref 135–145)

## 2023-12-29 PROCEDURE — 210N000001 HC R&B IMCU HEART CARE

## 2023-12-29 PROCEDURE — 250N000011 HC RX IP 250 OP 636: Performed by: INTERNAL MEDICINE

## 2023-12-29 PROCEDURE — 250N000013 HC RX MED GY IP 250 OP 250 PS 637: Performed by: INTERNAL MEDICINE

## 2023-12-29 PROCEDURE — 99233 SBSQ HOSP IP/OBS HIGH 50: CPT | Performed by: INTERNAL MEDICINE

## 2023-12-29 PROCEDURE — 36415 COLL VENOUS BLD VENIPUNCTURE: CPT | Performed by: NURSE PRACTITIONER

## 2023-12-29 PROCEDURE — 97530 THERAPEUTIC ACTIVITIES: CPT | Mod: GO

## 2023-12-29 PROCEDURE — 83735 ASSAY OF MAGNESIUM: CPT | Performed by: HOSPITALIST

## 2023-12-29 PROCEDURE — 99233 SBSQ HOSP IP/OBS HIGH 50: CPT | Mod: FS | Performed by: NURSE PRACTITIONER

## 2023-12-29 PROCEDURE — 250N000012 HC RX MED GY IP 250 OP 636 PS 637: Performed by: INTERNAL MEDICINE

## 2023-12-29 PROCEDURE — 80048 BASIC METABOLIC PNL TOTAL CA: CPT | Performed by: NURSE PRACTITIONER

## 2023-12-29 PROCEDURE — 250N000013 HC RX MED GY IP 250 OP 250 PS 637: Performed by: NURSE PRACTITIONER

## 2023-12-29 PROCEDURE — 97110 THERAPEUTIC EXERCISES: CPT | Mod: GO

## 2023-12-29 RX ORDER — TORSEMIDE 20 MG/1
40 TABLET ORAL DAILY
Status: DISCONTINUED | OUTPATIENT
Start: 2023-12-30 | End: 2023-12-31 | Stop reason: HOSPADM

## 2023-12-29 RX ORDER — FUROSEMIDE 10 MG/ML
40 INJECTION INTRAMUSCULAR; INTRAVENOUS EVERY 12 HOURS
Status: DISCONTINUED | OUTPATIENT
Start: 2023-12-29 | End: 2023-12-29

## 2023-12-29 RX ADMIN — APIXABAN 2.5 MG: 2.5 TABLET, FILM COATED ORAL at 20:24

## 2023-12-29 RX ADMIN — ACETAMINOPHEN 1000 MG: 500 TABLET, FILM COATED ORAL at 20:24

## 2023-12-29 RX ADMIN — ACETAMINOPHEN 1000 MG: 500 TABLET, FILM COATED ORAL at 09:37

## 2023-12-29 RX ADMIN — METOPROLOL TARTRATE 75 MG: 50 TABLET, FILM COATED ORAL at 20:24

## 2023-12-29 RX ADMIN — INSULIN ASPART 1 UNITS: 100 INJECTION, SOLUTION INTRAVENOUS; SUBCUTANEOUS at 13:47

## 2023-12-29 RX ADMIN — MICONAZOLE NITRATE: 2 POWDER TOPICAL at 20:29

## 2023-12-29 RX ADMIN — PANTOPRAZOLE SODIUM 20 MG: 20 TABLET, DELAYED RELEASE ORAL at 09:38

## 2023-12-29 RX ADMIN — FLUTICASONE FUROATE AND VILANTEROL TRIFENATATE 1 PUFF: 100; 25 POWDER RESPIRATORY (INHALATION) at 09:48

## 2023-12-29 RX ADMIN — METOPROLOL TARTRATE 50 MG: 50 TABLET, FILM COATED ORAL at 09:37

## 2023-12-29 RX ADMIN — INSULIN ASPART 1 UNITS: 100 INJECTION, SOLUTION INTRAVENOUS; SUBCUTANEOUS at 09:39

## 2023-12-29 RX ADMIN — APIXABAN 2.5 MG: 2.5 TABLET, FILM COATED ORAL at 09:38

## 2023-12-29 RX ADMIN — CLOPIDOGREL BISULFATE 75 MG: 75 TABLET ORAL at 09:38

## 2023-12-29 RX ADMIN — FEXOFENADINE HCL 180 MG: 180 TABLET ORAL at 20:24

## 2023-12-29 RX ADMIN — ATORVASTATIN CALCIUM 20 MG: 20 TABLET, FILM COATED ORAL at 09:38

## 2023-12-29 RX ADMIN — BUPROPION HYDROCHLORIDE 100 MG: 100 TABLET, FILM COATED, EXTENDED RELEASE ORAL at 09:38

## 2023-12-29 RX ADMIN — MICONAZOLE NITRATE: 2 POWDER TOPICAL at 09:46

## 2023-12-29 RX ADMIN — FUROSEMIDE 40 MG: 10 INJECTION, SOLUTION INTRAMUSCULAR; INTRAVENOUS at 05:26

## 2023-12-29 RX ADMIN — DULOXETINE HYDROCHLORIDE 60 MG: 60 CAPSULE, DELAYED RELEASE ORAL at 09:37

## 2023-12-29 ASSESSMENT — ACTIVITIES OF DAILY LIVING (ADL)
ADLS_ACUITY_SCORE: 37
ADLS_ACUITY_SCORE: 40
ADLS_ACUITY_SCORE: 37
ADLS_ACUITY_SCORE: 41
ADLS_ACUITY_SCORE: 37
ADLS_ACUITY_SCORE: 41
ADLS_ACUITY_SCORE: 40
ADLS_ACUITY_SCORE: 37
ADLS_ACUITY_SCORE: 40
ADLS_ACUITY_SCORE: 37
ADLS_ACUITY_SCORE: 40
ADLS_ACUITY_SCORE: 37

## 2023-12-29 NOTE — PLAN OF CARE
A&Ox4, denies CP. Tele AFIB SVR/CVR, VSS on RA. [place], edema to BLE. Echo complete. IV lasix given with minimal results. Plan to continue diuresis, discharge home next 1-2 days.

## 2023-12-29 NOTE — CONSULTS
Patient has Medicare Advantage through [employer].    Jardiance/Farxiga: Discharge Pharmacy can provide 14 days free.  Otherwise, $161/mo.  Entresto: Discharge Pharmacy can provide 1 mo free.  Otherwise, $181/mo.    Coverage in 2024:  Jardiance/Farxiga  --$47/mo  --lf/when total drug costs exceed $5,030, price will increase to a 25% coinsurance, equivalent to $149/mo.    Entresto  --$47/mo  --lf/when total drug costs exceed $5,030, price will increase to a 25% coinsurance, equivalent to $168/mo.    Kandi Farrell  Pharmacy Technician/Liaison, Discharge Pharmacy   [phone] (voice or text)  [email or web address]

## 2023-12-29 NOTE — PROGRESS NOTES
[facility]  Cardiology Progress Note  Date of Service: 12/29/2023  Date of Admission: 12/27/2023  Primary Cardiologist: Dr. Sinha    Summary    Ms. Moira Andre is a very pleasant 89 year old female with a past medical history of asthma, mild chronic kidney disease stage III, heart failure with preserved ejection fraction, diabetes mellitus type 2, hypertension, coronary artery disease, rheumatoid arthritis, obstructive sleep apnea not currently using CPAP, TIA, hyperlipidemia, GERD, atrial fibrillation on chronic anticoagulation with Eliquis come to the ER with complaint of worsening shortness of breath for the last couple of weeks.     Interval December 29, 2023    Pt continues to feel SOB at rest, dyspneic during conversation but overall improved since admission.  She has walked down the halls.  Diuresed down to 258# today.  Pt is on social security with fixed income; some HF therapies will be cost prohibitive.  I have discussed this with the patient.    Asssessment:    1.  Acute exacerbation of diastolic heart failure  - TTE completed here, difficult imaging study --> EF 55-60%.  MV MG 5-7 mmHg.  No change from prior.  - Improved weight with diuresis, 258# today.  Pt unsure of EDW.  - Still with persistent dyspnea and LE edema (R>L).  - On Lopressor 50 mg twice daily; no ACEi/ARB 2/2 past side effects.  - On long-term Clopidogrel.  2.  Atrial fibrillation with rapid ventricular response  - Sub-optimal rates, on BB.  - Chronic anticoagulation for high [id number] score.  - On Eliquis 2.5 mg BID (started Fall 2023).  4.  Coronary artery disease  - Hx remote stenting-no angina, troponin flat, RI/D1 PCI in 2007, Cath in 2017  50-60% pCx lesion, IFR and FFR unremarkable at 0.96 and 0.89 respectively. No other lesions over 40%.  - Pt notes delayed right sided wound healing --> consider PAD evaluation in the future?  5.  Untreated sleep apnea  6.  Moderate mitral and aortic  stenosis-unchanged on repeat echo.  _____________________________________________________________    Plan:     Continue IV diuresis, however, reduce Furosemide to 40 mg q12h.  Recheck BMP in AM.  Avoid nephrotoxins.  Continue Eliquis 2.5 mg BID.  Stop Clopidogrel while on A/C to reduce bleed risk.  Increase Lopressor to 75 mg twice daily.  Continue telemetry and HF support (daily standing weight, strict I/O, Na restriction).  Ambulation as tolerated.  Cardiology will continue to follow.  _____________________________________________________________    Thank you for the opportunity to participate in the care of Ms. Moira Andre.  Please feel free to reach out with any additional questions.    YOHANNES Muñoz, CNP   Nurse Practitioner  [facility]  Pager: [phone]  Phone: [phone]  Text Page (8am - 5pm, M-F)    Physical Exam   Temp: 97.5  F (36.4  C) Temp src: Oral BP: 103/70 Pulse: (!) 139   Resp: 18 SpO2: 96 % O2 Device: Nasal cannula (for resting) Oxygen Delivery: 2 LPM    Vitals:    12/27/23 1548 12/28/23 0100 12/29/23 0502   Weight: 122.5 kg (270 lb) 120.6 kg (265 lb 12.8 oz) 117.3 kg (258 lb 11.2 oz)     I/O last 3 completed shifts:  In: 840 [P.O.:840]  Out: 2025 [Urine:2025]    Constitutional: Appears stated age, well nourished, NAD.  Neck: Supple. Carotid pulses full and equal.   Respiratory: Non-labored. Lungs clear, diminished posterior bases.  Cardiovascular: IRR, normal S1 and S2. No M/G/R.  Vascular: Peripheral pulses intact and symmetric bilaterally  Skin: Warm and dry.  Bilateral LE edema 1+ to ankles, mid-shin, R>L  Musculoskeletal/Extremities: Symmetrical movement.   Neurologic: No gross focal deficits. Alert, awake, and oriented to all spheres.  Psychiatric: Affect appropriate. Mentation normal.    Data   Recent Labs   Lab 12/29/23  1223 12/29/23  1132 12/29/23  0824 12/28/23  1324 12/28/23  0548 12/28/23  0134 12/27/23  1556   WBC  --   --   --   --  10.4  --   10.0   HGB  --   --   --   --  12.5  --  12.9   MCV  --   --   --   --  87  --  85   PLT  --   --   --   --  290  --  323   NA  --  136  --   --  139  --  141   POTASSIUM  --  3.9  --   --  4.3  --  5.0   CHLORIDE  --  96*  --   --  103  --  105   CO2  --  27  --   --  25 -- 23   BUN  --  34.8*  --   --  31.2*  --  31.9*   CR  --  1.84*  --   --  1.59*  --  1.62*   ANIONGAP  --  13  --   --  11 --  13   TELLY  --  9.0  --   --  9.2  --  9.4   * 197* 140*   < > 136*   < > 184*   ALBUMIN  --   --   --   --   --   --  4.0   PROTTOTAL  --   --   --   --   --   --  7.5   BILITOTAL  --   --   --   --   --   --  0.4   ALKPHOS  --   --   --   --   --   --  85   ALT  --   --   --   --   --   --  10   AST  --   --   --   --   --   --  14    < > = values in this interval not displayed.       Recent Labs   Lab 12/28/23  0548 12/27/23  1556   WBC 10.4 10.0   HGB 12.5 12.9   HCT 41.9 42.8   MCV 87 85    323     Recent Labs   Lab 12/29/23  1223 12/29/23  1132 12/29/23  0824 12/28/23  1324 12/28/23  0548 12/28/23  0134 12/27/23  1556   NA  --  136  --   --  139  --  141   POTASSIUM  --  3.9  --   --  4.3  --  5.0   CHLORIDE  --  96*  --   --  103  --  105   CO2  --  27  --   --  25 -- 23   ANIONGAP  --  13  --   --  11  --  13   * 197* 140*   < > 136*   < > 184*   BUN  --  34.8*  --   --  31.2*  --  31.9*   CR  --  1.84*  --   --  1.59*  --  1.62*   GFRESTIMATED  --  26*  --   --  31*  --  30*   TELLY  --  9.0  --   --  9.2  --  9.4    < > = values in this interval not displayed.        Patient Active Problem List   Diagnosis    CKD (chronic kidney disease) stage 3, GFR 30-59 ml/min (H)    Morbid obesity (H)    CAD (coronary artery disease)    Type 2 diabetes mellitus with diabetic nephropathy (H)    Transient cerebral ischemia    Hyperlipidemia LDL goal <70    Ventral hernia    Intermittent asthma    Moderate major depression (H)    ELIGIO on CPAP    Microalbuminuria    S/P total knee arthroplasty    OA  (osteoarthritis) of knee    Anemia due to blood loss, acute    Health Care Home    Essential hypertension    Gastroesophageal reflux disease without esophagitis    Bilateral edema of lower extremity    Osteoarthritis    High risk medication use    Anxiety    Other chronic pain    Controlled substance agreement signed    Edema of lower extremity    Nephrolithiasis    Kidney stone    Tremor    Atypical chest pain    ACS (acute coronary syndrome) (H)    Non-rheumatic mitral valve stenosis    Coronary artery calcification seen on CAT scan    Lumbar radiculopathy    Back muscle spasm    Obesity hypoventilation syndrome (H)    CPAP/BiPAP dependent    Spinal stenosis of lumbosacral region    Bilateral hand pain    Primary osteoarthritis involving multiple joints    Atrial fibrillation with RVR (H)    Hyponatremia    Atrial fibrillation with rapid ventricular response (H)    Upper back pain     Medications    Continuing ACE inhibitor/ARB/ARNI from home medication list OR ACE inhibitor/ARB order already placed during this visit      - MEDICATION INSTRUCTIONS -        acetaminophen  1,000 mg Oral BID    apixaban ANTICOAGULANT  2.5 mg Oral BID    atorvastatin  20 mg Oral Daily    buPROPion  100 mg Oral Daily    DULoxetine  60 mg Oral Daily    fexofenadine  180 mg Oral QPM    fluticasone-vilanterol  1 puff Inhalation Daily    furosemide  40 mg Intravenous Q12H    insulin aspart  1-7 Units Subcutaneous TID AC    insulin aspart  1-5 Units Subcutaneous At Bedtime    metoprolol tartrate  75 mg Oral BID    miconazole   Topical BID    pantoprazole  20 mg Oral Daily    sodium chloride (PF)  3 mL Intracatheter Q8H       Data   Last 24 hours labs personally reviewed.  Echo:   Recent Results (from the past 4320 hour(s))   Echo Limited   Result Value    LVEF  55-60%    Narrative    [id number]  [id number]  [id number]  [id number]^MAMI^NATALY^SOY     [facility]  Echocardiography Laboratory  [address] [place] [address]  [id number]     Name: JOSIE ARRIAGA  MRN: [id number]  : 1933  Study Date: 2023 10:15 AM  Age: 89 yrs  Gender: Female  Patient Location: [facility]  Reason For Study: Heart Failure  Ordering Physician: NATALY BOLAÑOS  Referring Physician: GUI ERIC  Performed By: Julia Espinoza     BSA: 2.3 m2  Height: 67 in  Weight: 265 lb  HR: 84  BP: 145/77 mmHg  ______________________________________________________________________________  Procedure  Limited Echo Adult. Technically difficult study. Poor acoustic windows.  ______________________________________________________________________________  Interpretation Summary     This was a technically VERY difficult study. Limited echocardiogram.     Normal LV size and systolic function.  Normal RV size and systolic function.  Mitral apparatus is calcified with probably mild to moderate/moderate mitral  stenosis. Valve is not well-seen. Mean gradient is 5 to 7 mmHg.  Aortic valve is not well-seen. As previously described, moderate aortic  stenosis. Maximally obtained mean gradient is 24 mmHg with calculated aortic  valve area of 1.3 cmÂ .     No significant changes compared to echocardiogram 2 months ago.  ______________________________________________________________________________  Left Ventricle  The left ventricle is normal in size. The visual ejection fraction is 55-60%.     Right Ventricle  The right ventricle is normal in size and function.     Atria  The left atrium is mildly dilated.     Mitral Valve  There is severe mitral annular calcification. The mitral valve is not well  visualized. The mitral valve leaflets appear thickened, but open well. There  is trace to mild mitral regurgitation. The mean mitral valve gradient is 5.5  mmHg. Calcified mitral apparatus causing mitral stenosis. There is mild to  moderate mitral stenosis.     Aortic Valve  The aortic valve is not well visualized. The mean AoV pressure gradient is  21.7 mmHg. The calculated aortic  valve are is 1.3 cm^2. Moderate valvular  aortic stenosis. Increased aortic valve velocity. The peak AoV pressure  gradient is 38.0 mmHg.     Vessels  The aortic root is normal size. The inferior vena cava was normal in size with  preserved respiratory variability.     Pericardium  There is no pericardial effusion.  ______________________________________________________________________________  MMode/2D Measurements & Calculations  asc Aorta Diam: 3.4 cm  LVOT diam: 2.1 cm  LVOT area: 3.5 cm2  Asc Ao diam index BSA (cm/m2): 1.5  Asc Ao diam index Ht(cm/m): 2.0  LA Volume (BP): 91.7 ml     LA Volume Index (BP): 40.2 ml/m2     Doppler Measurements & Calculations  MV max P.4 mmHg  MV mean P.5 mmHg  MV V2 VTI: 41.0 cm  MVA(VTI): 2.1 cm2  Ao V2 max: 309.0 cm/sec  Ao max P.0 mmHg  Ao V2 mean: 218.0 cm/sec  Ao mean P.7 mmHg  Ao V2 VTI: 68.5 cm  MEL(I,D): 1.3 cm2  MEL(V,D): 1.2 cm2  LV V1 max P.7 mmHg  LV V1 max: 108.0 cm/sec  LV V1 VTI: 24.6 cm  SV(LVOT): 85.6 ml  SI(LVOT): 37.6 ml/m2  PA acc time: 0.10 sec  AV Julian Ratio (DI): 0.35  MEL Index (cm2/m2): 0.55     ______________________________________________________________________________  Report approved by: Bere Amado 2023 03:31 PM         Echocardiogram Complete   Result Value    LVEF  55%    Narrative    [id number]  [id number]  [id number]  [id number]^LYDIA^MARK^NUHA     [facility]  Echocardiography Laboratory  [address] [place], [place] [address]     Name: JOSIE ARRIAGA  MRN: [id number]  : 1933  Study Date: 10/12/2023 10:24 AM  Age: 89 yrs  Gender: Female  Patient Location: [facility]  Reason For Study: Atrial Fibrillation  Ordering Physician: MARK FREEMAN  Performed By: Danika Rivas     BSA: 2.3 m2  Height: 67 in  Weight: 272 lb  BP: 122/78 mmHg  ______________________________________________________________________________  Procedure  Complete Portable Echo Adult. Optison ([facility] #[id number]) given  intravenously.  ______________________________________________________________________________  Interpretation Summary     1. The left ventricle is normal in structure, function and size. The visual  ejection fraction is estimated at 55%.  2. The right ventricle is normal in structure, function and size.  3. There is moderate mitral stenosis. The mean mitral valve gradient is 6mmHg.  4. Moderate valvular aortic stenosis. Mean 20mmHg, Vmax 2.7m/s, MEL 1.2cm2, DI  0.40.     Echo 11/2020 showed EF 60%, MV mean 7mmHg, AS with mean 10mmHg, Vmax 2.0m/s.  ______________________________________________________________________________  Left Ventricle  The left ventricle is normal in structure, function and size. There is normal  left ventricular wall thickness. The visual ejection fraction is estimated at  55%. Diastolic function not assessed due to atrial fibrillation. Normal left  ventricular wall motion.     Right Ventricle  The right ventricle is normal in structure, function and size.     Atria  The left atrium is mildly dilated. Right atrial size is normal. There is no  atrial shunt seen.     Mitral Valve  There is moderate mitral annular calcification. There is no mitral  regurgitation noted. There is moderate mitral stenosis. The mean mitral valve  gradient is 6mmHg.     Tricuspid Valve  No tricuspid regurgitation.     Aortic Valve  Moderate valvular aortic stenosis. Mean 20mmHg, Vmax 2.7m/s, MEL 1.2cm2, DI  0.40.     Pulmonic Valve  The pulmonic valve is normal in structure and function.     Vessels  Normal ascending, transverse (arch), and descending aorta. The inferior vena  cava was normal in size with preserved respiratory variability.     Pericardium  There is no pericardial effusion.     Rhythm  The rhythm was atrial fibrillation.  ______________________________________________________________________________  MMode/2D Measurements & Calculations  IVSd: 1.2 cm  LVIDd: 3.6 cm  LVIDs: 2.7 cm  LVPWd: 1.2 cm  FS:  25.9 %  LV mass(C)d: 138.1 grams  LV mass(C)dI: 59.9 grams/m2  Ao root diam: 3.1 cm  LA dimension: 5.1 cm  asc Aorta Diam: 3.5 cm  LA/Ao: 1.7  LVOT diam: 2.0 cm  LVOT area: 3.3 cm2  Ao root diam index Ht(cm/m): 1.8  Ao root diam index BSA (cm/m2): 1.3  Asc Ao diam index BSA (cm/m2): 1.5  Asc Ao diam index Ht(cm/m): 2.0  LA Volume (BP): 91.0 ml     LA Volume Index (BP): 39.6 ml/m2  RV Base: 5.1 cm  RWT: 0.66  TAPSE: 1.8 cm     Doppler Measurements & Calculations  MV E max julian: 172.0 cm/sec  MV max P.7 mmHg  MV mean P.0 mmHg  MV V2 VTI: 35.3 cm  MVA(VTI): 1.9 cm2  MV dec slope: 603.4 cm/sec2  MV dec time: 0.32 sec  Ao V2 max: 273.1 cm/sec  Ao max P.0 mmHg  Ao V2 mean: 210.0 cm/sec  Ao mean P.8 mmHg  Ao V2 VTI: 59.6 cm  MEL(I,D): 1.1 cm2  MEL(V,D): 1.3 cm2  LV V1 max P.9 mmHg  LV V1 max: 110.0 cm/sec  LV V1 VTI: 20.0 cm  SV(LVOT): 65.4 ml  SI(LVOT): 28.4 ml/m2  PA acc time: 0.12 sec     AV Julian Ratio (DI): 0.40  MEL Index (cm2/m2): 0.48  E/E' av.1  Lateral E/e': 19.6  Medial E/e': 22.6  RV S Julian: 10.4 cm/sec     ______________________________________________________________________________  Report approved by: Bere Lindsay 10/12/2023 02:50 PM

## 2023-12-29 NOTE — PLAN OF CARE
Goal Outcome Evaluation:  A&O x4, VSS on RA. Noted desat to 87% on RA while asleep. Sat 90s at 2L/NC while asleep. Encouraged breathing exercise. Sat 90s on RA. Tele Afib w/CVR. BLE edema-encouraged elevation. Diuresing adequately on iv lasix. Denies SOB/chest pain. Up with SBA with walker & GB. Plans for home discharge with home cares pending.

## 2023-12-29 NOTE — CONSULTS
REASON FOR ASSESSMENT:  CHF Consult for 2 gm NA Diet Education    NUTRITION HISTORY:  Spoke with patient at bedside. Per patient, she has never received education on a low sodium diet before. She said a usual day of eating is toast and juice in the morning and a tv dinner in the evening. She knows tv dinners are high in sodium so she tries to choose the ones that have lower sodium. She does her own cooking and grocery shopping. She said she usually tries to avoid buying canned items because she knows they have more salt. Writer went over tips to reduce sodium further in patient's diet, discussed alternatives to foods she is currently eating, and answered patient's questions regarding low sodium. Patient appreciative of information and education.    CURRENT DIET:  2g Na Diet; 1500 mL Fluid Restriction    NUTRITION DIAGNOSIS:  Food- and nutrition-related knowledge deficit R/t no prior exposure as evidenced by pt report of no previous low sodium education, need for RD to educate this admit      INTERVENTIONS:  Nutrition Prescription:  Patient to follow a 2g Na diet    Implementation:  Assessed learning needs, learning preferences, and willingness to learn  Nutrition Education (Content):  Provided handouts:  Heart Failure Nutrition Therapy  Discussed rational for limiting Na for CHF and stressed importance of following 2 gm Na guidelines   Encouraged patient to keep a daily food record  Nutrition Education (Application):  Discussed current eating habits and recommended alternative food choices  Anticipated good compliance  Diet Education - refer to Education Flowsheet    Goals:  Patient verbalizes understanding of diet  All of the above goals met during the education session    Follow Up:  Provided RD contact information for future questions.  Recommend Out-Patient Nutrition Referral, if further diet instructions are needed.    Jessie Madsen RD, LD  Clinical Dietitian - [employer]

## 2023-12-29 NOTE — PROGRESS NOTES
"[facility] [place]    Medicine Progress Note - Hospitalist Service    Date of Admission:  12/27/2023    Assessment & Plan   Moira Andre is a 89-year-old female with history of asthma, mild chronic kidney disease stage III, heart failure with preserved ejection fraction, diabetes mellitus type 2, hypertension, coronary artery disease, rheumatoid arthritis, obstructive sleep apnea not currently using CPAP, TIA, hyperlipidemia, GERD, atrial fibrillation on chronic anticoagulation with Eliquis come to the ER with complaint of worsening shortness of breath for the last couple of weeks.    Atrial fibrillation with RVR  Chronic anticoagulation with Eliquis  Moira Andre is a 89-year-old female with multiple comorbidities as mentioned above, found to be in A-fib with RVR with a heart rate of 142 on admission. She was started on IV Cardizem infusion with improvement in rate control.  Admitted to inpatient under IMC status.  Cardiology consult requested, I appreciate Dr. Nelson's evaluation and recommendations  Discontinued IMC status, no longer requires diltiazem infusion.  Per Dr. Nelson, \"We have uptitrated dose of metoprolol to 75 mg twice daily.  Further uptitration will possibly be limited by soft blood pressure.  Not a candidate for digoxin due to CKD.\"  Continue PTA Eliquis.  Discontinued Plavix per cardiology recommendations    Acute on chronic diastolic congestive heart failure/HFpEF  Moderate aortic stenosis  Moderate mitral stenosis  Patient presented with worsening shortness of breath, dyspnea on exertion, orthopnea, PND, lower extremity edema. Chest x-ray consistent with pulmonary edema, small pleural effusions as well. BNP elevated at 4,160.  Diuresing with IV lasix 40 mg every 8 hours.  Monitor I&O and daily weights.  TTE on 12/28/23 showed LVEF 55-60%, moderate MS and AS.  Low salt diet, discussed with patient.  Cardiology consulted as noted above  Dr. Nelson continues, \"She was only on " "20 mg of Lasix prior to admission and will require to be on higher dose, possibly 40 of torsemide when switching to p.o. \"    Coronary artery disease  NSTEMI type II  Hypertension  Hyperlipidemia  History of coronary artery disease status post stent in the past, she is on Eliquis as well as Plavix.  Stents were done many years ago.  She is not on aspirin because of allergy. Troponin mildly elevated at 33 in the ER, flat on recheck. EKG showed atrial fibrillation with RVR.  Mildly elevated troponin felt to be secondary to A-fib with RVR and CHF and likely demand ischemia rather than ACS at this time.  She has no chest pain.  PTA metoprolol increased to 75 mg PO BID at the time of admission for rate control.  Continue PTA atorvastatin.  PTA plavix discontinued by cardiology on 12/28/23, no longer indicated    Diabetes mellitus type 2  Hemoglobin A1c 7.9% on 10/26/23.  Hold PTA glimepiride.  Monitor blood sugars and use medium dose sliding scale NovoLog.  Moderate carbohydrate diet.  Monitor for hypoglycemia.  Blood sugar readings are at goal    Chronic kidney disease, stage IIIb  Her baseline creatinine is between 1.6-1.8.  Monitor renal function with diuresis.    Obstructive sleep apnea  She does have a CPAP, but not using as there is a recall.  CPAP ordered per home settigns in the hospital.    Asthma  No acute exacerbation at this time.  Continue PTA Wixela inhub inhaler or formulary equivalent and PRN albuterol inhaler.    Depression  Anxiety  Continue PTA wellbutrin and cymbalta.    GERD  Continue PTA pantoprazole.          Diet: Fluid restriction 1500 ML FLUID  Combination Diet Regular Diet Adult; Moderate Consistent Carb (60 g CHO per Meal) Diet; 2 gm NA Diet    DVT Prophylaxis: DOAC  Charles Catheter: Not present  Lines: None     Cardiac Monitoring: ACTIVE order. Indication: C  Code Status: Full Code      Clinically Significant Risk Factors                  # Hypertension: Noted on problem list       # DMII: " "A1C = 7.9 % (Ref range: 0.0 - 5.6 %) within past 6 months, PRESENT ON ADMISSION  # Severe Obesity: Estimated body mass index is 40.52 kg/m  as calculated from the following:    Height as of this encounter: 1.702 m (5' 7\").    Weight as of this encounter: 117.3 kg (258 lb 11.2 oz)., PRESENT ON ADMISSION       # Financial/Environmental Concerns: none  # Asthma: noted on problem list        Disposition Plan      Expected Discharge Date: 12/30/2023      Destination: home with help/services            Ivanna Mccurdy MD  Hospitalist Service  [facility]  Securely message with [employer] (more info)  Text page via [employer] Paging/Directory   ______________________________________________________________________    Interval History   \"I was never this short of breath.\"  Patient recounts history of the past several months.  She says she began feeling short of breath with activity last summer, dyspnea progressed.  She says she has had several hospital stays related to atrial fibrillation, back pain and kidney stone.  She denies feeling short of breath at rest but gets short of breath with any activity, even walking from her hospital bed to the commode and back, takes her a couple of minutes to recover.  She has no new GI complaints.    Physical Exam   Vital Signs: Temp: 97.5  F (36.4  C) Temp src: Oral BP: (!) 128/98 Pulse: 106   Resp: 18 SpO2: 98 % O2 Device: Nasal cannula Oxygen Delivery: 2 LPM  Weight: 258 lbs 11.2 oz    Constitutional: Awake, alert, cooperative, no apparent distress  Respiratory: Tachypneic, lungs are quite clear  Cardiovascular: Tachycardic, irregularly irregular rhythm  GI: Normal bowel sounds, soft, non-distended, non-tender  Skin/Integumen: Bilateral lower extremity edema  Other: Mood is distressed by dyspnea      Medical Decision Making       35 MINUTES SPENT BY ME on the date of service doing chart review, history, exam, documentation & further activities per the note.      Data "         Imaging results reviewed over the past 24 hrs:   Recent Results (from the past 24 hour(s))   Echo Limited   Result Value    LVEF  55-60%    [facility]    [id number]  [id number]  [id number]  [id number]^MAMI^NATALY^SOY     [facility]  Echocardiography Laboratory  [address] [place] [address]     Name: JOSIE ARRIAGA  MRN: [id number]  : 1933  Study Date: 2023 10:15 AM  Age: 89 yrs  Gender: Female  Patient Location: [facility]  Reason For Study: Heart Failure  Ordering Physician: NATALY BOLAÑOS  Referring Physician: GUI ERIC  Performed By: Julia Espinoza     BSA: 2.3 m2  Height: 67 in  Weight: 265 lb  HR: 84  BP: 145/77 mmHg  ______________________________________________________________________________  Procedure  Limited Echo Adult. Technically difficult study. Poor acoustic windows.  ______________________________________________________________________________  Interpretation Summary     This was a technically VERY difficult study. Limited echocardiogram.     Normal LV size and systolic function.  Normal RV size and systolic function.  Mitral apparatus is calcified with probably mild to moderate/moderate mitral  stenosis. Valve is not well-seen. Mean gradient is 5 to 7 mmHg.  Aortic valve is not well-seen. As previously described, moderate aortic  stenosis. Maximally obtained mean gradient is 24 mmHg with calculated aortic  valve area of 1.3 cmÂ .     No significant changes compared to echocardiogram 2 months ago.  ______________________________________________________________________________  Left Ventricle  The left ventricle is normal in size. The visual ejection fraction is 55-60%.     Right Ventricle  The right ventricle is normal in size and function.     Atria  The left atrium is mildly dilated.     Mitral Valve  There is severe mitral annular calcification. The mitral valve is not well  visualized. The mitral valve leaflets appear thickened, but open well.  There  is trace to mild mitral regurgitation. The mean mitral valve gradient is 5.5  mmHg. Calcified mitral apparatus causing mitral stenosis. There is mild to  moderate mitral stenosis.     Aortic Valve  The aortic valve is not well visualized. The mean AoV pressure gradient is  21.7 mmHg. The calculated aortic valve are is 1.3 cm^2. Moderate valvular  aortic stenosis. Increased aortic valve velocity. The peak AoV pressure  gradient is 38.0 mmHg.     Vessels  The aortic root is normal size. The inferior vena cava was normal in size with  preserved respiratory variability.     Pericardium  There is no pericardial effusion.  ______________________________________________________________________________  MMode/2D Measurements & Calculations  asc Aorta Diam: 3.4 cm  LVOT diam: 2.1 cm  LVOT area: 3.5 cm2  Asc Ao diam index BSA (cm/m2): 1.5  Asc Ao diam index Ht(cm/m): 2.0  LA Volume (BP): 91.7 ml     LA Volume Index (BP): 40.2 ml/m2     Doppler Measurements & Calculations  MV max P.4 mmHg  MV mean P.5 mmHg  MV V2 VTI: 41.0 cm  MVA(VTI): 2.1 cm2  Ao V2 max: 309.0 cm/sec  Ao max P.0 mmHg  Ao V2 mean: 218.0 cm/sec  Ao mean P.7 mmHg  Ao V2 VTI: 68.5 cm  MEL(I,D): 1.3 cm2  MEL(V,D): 1.2 cm2  LV V1 max P.7 mmHg  LV V1 max: 108.0 cm/sec  LV V1 VTI: 24.6 cm  SV(LVOT): 85.6 ml  SI(LVOT): 37.6 ml/m2  PA acc time: 0.10 sec  AV Julian Ratio (DI): 0.35  MEL Index (cm2/m2): 0.55     ______________________________________________________________________________  Report approved by: Bere Amado 2023 03:31 PM

## 2023-12-30 LAB
ANION GAP SERPL CALCULATED.3IONS-SCNC: 12 MMOL/L (ref 7–15)
BUN SERPL-MCNC: 38 MG/DL (ref 8–23)
CALCIUM SERPL-MCNC: 9 MG/DL (ref 8.2–9.6)
CHLORIDE SERPL-SCNC: 99 MMOL/L (ref 98–107)
CREAT SERPL-MCNC: 1.92 MG/DL (ref 0.51–0.95)
DEPRECATED HCO3 PLAS-SCNC: 30 MMOL/L (ref 22–29)
EGFRCR SERPLBLD CKD-EPI 2021: 24 ML/MIN/1.73M2
GLUCOSE BLDC GLUCOMTR-MCNC: 124 MG/DL (ref 70–99)
GLUCOSE BLDC GLUCOMTR-MCNC: 127 MG/DL (ref 70–99)
GLUCOSE BLDC GLUCOMTR-MCNC: 141 MG/DL (ref 70–99)
GLUCOSE BLDC GLUCOMTR-MCNC: 141 MG/DL (ref 70–99)
GLUCOSE BLDC GLUCOMTR-MCNC: 247 MG/DL (ref 70–99)
GLUCOSE SERPL-MCNC: 127 MG/DL (ref 70–99)
POTASSIUM SERPL-SCNC: 4.3 MMOL/L (ref 3.4–5.3)
SODIUM SERPL-SCNC: 141 MMOL/L (ref 135–145)

## 2023-12-30 PROCEDURE — 250N000013 HC RX MED GY IP 250 OP 250 PS 637: Performed by: NURSE PRACTITIONER

## 2023-12-30 PROCEDURE — 80048 BASIC METABOLIC PNL TOTAL CA: CPT | Performed by: NURSE PRACTITIONER

## 2023-12-30 PROCEDURE — 99207 PR CDG-CUT & PASTE-POTENTIAL IMPACT ON LEVEL: CPT | Performed by: INTERNAL MEDICINE

## 2023-12-30 PROCEDURE — 210N000001 HC R&B IMCU HEART CARE

## 2023-12-30 PROCEDURE — 99232 SBSQ HOSP IP/OBS MODERATE 35: CPT | Performed by: INTERNAL MEDICINE

## 2023-12-30 PROCEDURE — 36415 COLL VENOUS BLD VENIPUNCTURE: CPT | Performed by: NURSE PRACTITIONER

## 2023-12-30 PROCEDURE — 99233 SBSQ HOSP IP/OBS HIGH 50: CPT | Performed by: INTERNAL MEDICINE

## 2023-12-30 PROCEDURE — 99207 PR NO BILLABLE SERVICE THIS VISIT: CPT | Performed by: INTERNAL MEDICINE

## 2023-12-30 PROCEDURE — 250N000013 HC RX MED GY IP 250 OP 250 PS 637: Performed by: INTERNAL MEDICINE

## 2023-12-30 RX ORDER — GLIMEPIRIDE 2 MG/1
2 TABLET ORAL
Status: DISCONTINUED | OUTPATIENT
Start: 2023-12-31 | End: 2023-12-31 | Stop reason: HOSPADM

## 2023-12-30 RX ADMIN — MICONAZOLE NITRATE: 2 POWDER TOPICAL at 08:30

## 2023-12-30 RX ADMIN — APIXABAN 2.5 MG: 2.5 TABLET, FILM COATED ORAL at 21:23

## 2023-12-30 RX ADMIN — METOPROLOL TARTRATE 75 MG: 50 TABLET, FILM COATED ORAL at 08:29

## 2023-12-30 RX ADMIN — INSULIN ASPART 1 UNITS: 100 INJECTION, SOLUTION INTRAVENOUS; SUBCUTANEOUS at 12:41

## 2023-12-30 RX ADMIN — ACETAMINOPHEN 1000 MG: 500 TABLET, FILM COATED ORAL at 08:28

## 2023-12-30 RX ADMIN — ATORVASTATIN CALCIUM 20 MG: 20 TABLET, FILM COATED ORAL at 08:29

## 2023-12-30 RX ADMIN — DULOXETINE HYDROCHLORIDE 60 MG: 60 CAPSULE, DELAYED RELEASE ORAL at 08:28

## 2023-12-30 RX ADMIN — FEXOFENADINE HCL 180 MG: 180 TABLET ORAL at 21:23

## 2023-12-30 RX ADMIN — FLUTICASONE FUROATE AND VILANTEROL TRIFENATATE 1 PUFF: 100; 25 POWDER RESPIRATORY (INHALATION) at 08:29

## 2023-12-30 RX ADMIN — METOPROLOL TARTRATE 75 MG: 50 TABLET, FILM COATED ORAL at 21:23

## 2023-12-30 RX ADMIN — ACETAMINOPHEN 1000 MG: 500 TABLET, FILM COATED ORAL at 21:23

## 2023-12-30 RX ADMIN — APIXABAN 2.5 MG: 2.5 TABLET, FILM COATED ORAL at 08:29

## 2023-12-30 RX ADMIN — PANTOPRAZOLE SODIUM 20 MG: 20 TABLET, DELAYED RELEASE ORAL at 08:28

## 2023-12-30 RX ADMIN — BUPROPION HYDROCHLORIDE 100 MG: 100 TABLET, FILM COATED, EXTENDED RELEASE ORAL at 08:29

## 2023-12-30 RX ADMIN — TORSEMIDE 40 MG: 20 TABLET ORAL at 08:28

## 2023-12-30 ASSESSMENT — ACTIVITIES OF DAILY LIVING (ADL)
ADLS_ACUITY_SCORE: 37
ADLS_ACUITY_SCORE: 33
ADLS_ACUITY_SCORE: 37
ADLS_ACUITY_SCORE: 36
ADLS_ACUITY_SCORE: 37
ADLS_ACUITY_SCORE: 36
ADLS_ACUITY_SCORE: 33
ADLS_ACUITY_SCORE: 37
ADLS_ACUITY_SCORE: 37
ADLS_ACUITY_SCORE: 33
ADLS_ACUITY_SCORE: 33
ADLS_ACUITY_SCORE: 37

## 2023-12-30 NOTE — PLAN OF CARE
[id number]  A&Ox4. VSS. 1L NC. SOB w exertion. Ambulating to bathroom with walker/GB/SBA. Adequate UO. Tele: afib CVR. No CP reported.   Plan: discharge to home w home health

## 2023-12-30 NOTE — PLAN OF CARE
[id number]. Pt here with Afib, SOB. A&Ox4. VSS on 2L NC, weaned down to 1L now. Tele afib CVR. Tolerating 2g Na diet, 1500 mL. Up with SBA and GB+W. Denies pain on scheduled tylenol. PIV SL. Pt hopeful for discharge back to home with home health service.

## 2023-12-30 NOTE — PLAN OF CARE
Shift Note [id number]:     Patient is alert and oriented x4.   Mobility: SB with GB/Walker  Vitals: VSS  Tele: Afib CVR  Aggression Stop Light: green    Shift Summary: Continues on fluid restriction. Ambulated 3x in hallway. Changed from IV diuretics to PO. Uses 2L NC when sleeping.    Discharge Plan: TBD, possible home health care    See Flow sheets for assessment

## 2023-12-30 NOTE — PROGRESS NOTES
[facility]    Cardiology Consultation - Progress Note     Date of Admission:  12/27/2023  Date of Service: 12/30/2023    Reason for Consult   Reason for consult: heart failure management    History of Present Illness   Moira Andre is a 89 year old female who was admitted on 12/27/2023 for shortness of breath. Medical comorbidities include atrial fibrillation, heart failure with preserved ejection fraction, coronary artery disease, hypertension, diabetes mellitus type 2, TIA, hyperlipidemia, mild chronic kidney disease stage III, rheumatoid arthritis, obstructive sleep apnea not currently using CPAP, asthma, and GERD. She was found to be volume overloaded and has been diuresed.     Assessment & Plan   1.  Acute exacerbation of diastolic heart failure  - TTE completed here, difficult imaging study --> EF 55-60%.  MV MG 5-7 mmHg.  No change from prior.  - Improved weight with diuresis, 257# today.  Pt unsure of EDW.  - Still with persistent dyspnea and LE edema (R>L).  - On Lopressor 50 mg twice daily; no ACEi/ARB 2/2 past side effects.  - On long-term Clopidogrel PTA, now discontinued this hospitalization  2.  Atrial fibrillation with rapid ventricular response  - Sub-optimal rates, on BB.  - Chronic anticoagulation for high [id number] score.  - On Eliquis 2.5 mg BID (started Fall 2023).  4.  Coronary artery disease  - Hx remote stenting-no angina, troponin flat, RI/D1 PCI in 2007, Cath in 2017  50-60% pCx lesion, IFR and FFR unremarkable at 0.96 and 0.89 respectively. No other lesions over 40%.  - Pt notes delayed right sided wound healing --> consider PAD evaluation in the future?  5.  Untreated sleep apnea  6.  Moderate mitral and aortic stenosis-unchanged on repeat echo.    Plan:  Starting torsemide 40 mg daily today  Creatinine slightly higher today, will monitor another day on oral torsemide and recheck creatinine tomorrow, may be able to discharge tomorrow if things are  stable  Continue Eliquis 2.5 mg BID, atorvastatin 20 mg daily, metoprolol tartrate 75 mg BID  Continue telemetry and HF support (daily standing weight, strict I/O, Na restriction)  Ambulation as tolerated  Cardiology will continue to follow    Inocente Thayer MD    Primary Care Physician   Maryan Churchill    Patient Active Problem List   Diagnosis    CKD (chronic kidney disease) stage 3, GFR 30-59 ml/min (H)    Morbid obesity (H)    CAD (coronary artery disease)    Type 2 diabetes mellitus with diabetic nephropathy (H)    Transient cerebral ischemia    Hyperlipidemia LDL goal <70    Ventral hernia    Intermittent asthma    Moderate major depression (H)    ELIGIO on CPAP    Microalbuminuria    S/P total knee arthroplasty    OA (osteoarthritis) of knee    Anemia due to blood loss, acute    Health Care Home    Essential hypertension    Gastroesophageal reflux disease without esophagitis    Bilateral edema of lower extremity    Osteoarthritis    High risk medication use    Anxiety    Other chronic pain    Controlled substance agreement signed    Edema of lower extremity    Nephrolithiasis    Kidney stone    Tremor    Atypical chest pain    ACS (acute coronary syndrome) (H)    Non-rheumatic mitral valve stenosis    Coronary artery calcification seen on CAT scan    Lumbar radiculopathy    Back muscle spasm    Obesity hypoventilation syndrome (H)    CPAP/BiPAP dependent    Spinal stenosis of lumbosacral region    Bilateral hand pain    Primary osteoarthritis involving multiple joints    Atrial fibrillation with RVR (H)    Hyponatremia    Atrial fibrillation with rapid ventricular response (H)    Upper back pain       Past Medical History   I have reviewed this patient's medical history and updated it with pertinent information if needed.   Past Medical History:   Diagnosis Date    Aortic valve sclerosis     heart murmur, no AS    Arrhythmia     PAT, PVC    Aspirin allergy     Plavix use long term    Asthma     CKD  (chronic kidney disease) stage 3, GFR 30-59 ml/min (H)     x 2007 atleast    Congestive heart failure, unspecified     Depression     Diabetes mellitus (H) 2010    Diastolic dysfunction, left ventricle 2013    grade 2, nl ef    HTN (hypertension)     Lactic acidosis 08/2018    due to dehydration and metformin    Migraine headache     Mitral stenosis     mild, 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    Myocardial infarction (H) 9/2007, cath 2013 ml    BMS: stent to OM, diag, nl EF, echo /C angia 2013 , f/u cath no lesion >40%    Nephrolithiasis     right side    OA (osteoarthritis) of knee     Obesity     Rheumatoid arthritis flare (H)     prednisone    Sleep apnea     restarted using cpap 2017    TIA (transient ischaemic attack)     Ventral hernia, unspecified, without mention of obstruction or gangrene        Past Surgical History   I have reviewed this patient's surgical history and updated it with pertinent information if needed.  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 Taking?   DULoxetine (CYMBALTA) 60 MG capsule 12/26/2023 at PM Self No Yes   Sig: Take 1 capsule (60 mg) by mouth daily   Menthol (Topical Analgesic) 7.5 % (Roll) MISC 12/26/2023 Self Yes Yes   Sig: Apply to affected area every morning   Multiple Vitamins-Minerals (HAIR SKIN AND NAILS FORMULA PO) 12/26/2023 at  PM Self Yes Yes   Sig: Take 1 tablet by mouth daily   Vitamin D3 (CHOLECALCIFEROL) 25 mcg (1000 units) tablet 12/26/2023 at PM Self Yes Yes   Sig: Take 25 mcg by mouth daily   acetaminophen (TYLENOL) 500 MG tablet 12/26/2023 at PM Self Yes Yes   Sig: Take 1,000 mg by mouth 2 times daily   albuterol (PROAIR HFA/PROVENTIL HFA/VENTOLIN HFA) 108 (90 Base) MCG/ACT inhaler 12/27/2023 Self Yes Yes   Sig: Inhale 2 puffs into the lungs every 6 hours as needed for shortness of breath, wheezing or cough For shortness of breath   apixaban ANTICOAGULANT (ELIQUIS) 2.5 MG tablet 12/26/2023 at PM Self No Yes   Sig: Take 1 tablet (2.5 mg) by mouth 2 times daily   atorvastatin (LIPITOR) 20 MG tablet 12/26/2023 at PM Self No Yes   Sig: TAKE 1 TABLET BY MOUTH DAILY FOR CHOLESTEROL   buPROPion (WELLBUTRIN SR) 100 MG 12 hr tablet 12/26/2023 at PM Self No Yes   Sig: Take 1 tablet (100 mg) by mouth daily for mood   calcium carbonate (TUMS) 500 MG chewable tablet  at PRN Self Yes Yes   Sig: Take 1 chew tab by mouth 2 times daily as needed for heartburn   camphor-menthol-methyl salicylate 3.1-6-10 % PTCH  at PRN Self No Yes   Sig: Apply 1 patch topically as needed (hand pain) Or back pain   clopidogrel (PLAVIX) 75 MG tablet 12/26/2023 at PM Self No Yes   Sig: TAKE 1 TABLET BY MOUTH DAILY   fexofenadine (ALLEGRA) 180 MG tablet 12/26/2023 at PM Self Yes Yes   Sig: Take 180 mg by mouth every evening   fluticasone (FLONASE) 50 MCG/ACT nasal spray  at PRN Self Yes No   Sig: Spray 1 spray into both nostrils daily as needed    fluticasone-salmeterol (WIXELA INHUB) 100-50 MCG/ACT inhaler 12/26/2023 at AM Self No Yes   Sig: USE 1 INHALATION BY MOUTH EVERY  12 HOURS   Patient taking differently: Inhale 1 puff into the lungs daily   furosemide (LASIX) 20 MG tablet 12/26/2023 at PM Self No Yes   Sig: Take 1 tablet (20 mg) by mouth daily   glimepiride (AMARYL) 2 MG tablet 12/26/2023 at PM Self No Yes   Sig: Take 1 tablet (2 mg) by mouth 2 times daily  (before meals)   metoprolol tartrate (LOPRESSOR) 25 MG tablet 12/26/2023 at PM Self No Yes   Sig: Take 1 tablet (25 mg) by mouth 2 times daily   nitroGLYcerin (NITROSTAT) 0.4 MG sublingual tablet  at PRN Self No Yes   Sig: FOR CHEST PAIN PLACE 1 TABLET  UNDER TONGUE EVERY 5 MINUTES FOR 3 DOSES. IF SYMPTOMS PERSIST 5  MINUTES AFTER 1ST DOSE CALL 911   pantoprazole (PROTONIX) 20 MG EC tablet 12/26/2023 at AM Self No Yes   Sig: Take 1 tablet (20 mg) by mouth daily      Facility-Administered Medications: None     Current Facility-Administered Medications   Medication Dose Route Frequency    acetaminophen  1,000 mg Oral BID    apixaban ANTICOAGULANT  2.5 mg Oral BID    atorvastatin  20 mg Oral Daily    buPROPion  100 mg Oral Daily    DULoxetine  60 mg Oral Daily    fexofenadine  180 mg Oral QPM    fluticasone-vilanterol  1 puff Inhalation Daily    insulin aspart  1-7 Units Subcutaneous TID AC    insulin aspart  1-5 Units Subcutaneous At Bedtime    metoprolol tartrate  75 mg Oral BID    miconazole   Topical BID    pantoprazole  20 mg Oral Daily    sodium chloride (PF)  3 mL Intracatheter Q8H    torsemide  40 mg Oral Daily     Current Facility-Administered Medications   Medication Last Rate    Continuing ACE inhibitor/ARB/ARNI from home medication list OR ACE inhibitor/ARB order already placed during this visit      - MEDICATION INSTRUCTIONS -       Allergies   Allergies   Allergen Reactions    Aspirin Hives     Reaction occurred during childhood.     Lisinopril Cough    Losartan      Hyperkalemia      Metformin      Elevated lactic acid    Minocycline      Yellow Dye Allergy. Minocycline has Yellow Dye #10.    Mounjaro [Tirzepatide] Diarrhea and GI Disturbance    Salicylates Hives    Yellow Dye Hives     Rxn to yellow tablet. Eyes swelled shut.     Yellow Dyes (Non-Tartrazine) Hives       Social History    reports that she quit smoking about 50 years ago. Her smoking use included cigarettes. She started smoking about 52  "years ago. She has a 0.3 pack-year smoking history. She has never used smokeless tobacco. She reports current alcohol use. She reports that she does not use drugs.    Family History   Family History   Problem Relation Age of Onset    Neurologic Disorder Mother         MS - at 60's    C.A.D. Father          at 8o's, ? prostate ca    Breast Cancer No family hx of     Cancer - colorectal No family hx of        Review of Systems   The comprehensive Review of Systems is negative other than noted in the HPI or here.     Physical Exam   Vital Signs with Ranges  Temp:  [97.5  F (36.4  C)] 97.5  F (36.4  C)  Pulse:  [] 89  Resp:  [16-18] 18  BP: (103-135)/() 135/110  SpO2:  [86 %-99 %] 95 %  Wt Readings from Last 4 Encounters:   23 116.7 kg (257 lb 4.8 oz)   23 125.8 kg (277 lb 4.8 oz)   23 127.8 kg (281 lb 12.8 oz)   23 126 kg (277 lb 12.5 oz)     I/O last 3 completed shifts:  In: 920 [P.O.:920]  Out: 1850 [Urine:1850]      Vitals: BP (!) 135/110 (BP Location: Left arm)   Pulse 89   Temp 97.5  F (36.4  C) (Oral)   Resp 18   Ht 1.702 m (5' 7\")   Wt 116.7 kg (257 lb 4.8 oz)   SpO2 95%   BMI 40.30 kg/m      General: Alert, oriented, in no acute distress  HEENT: PERRLA, mucous membranes moist  Neck: Normal JVP  CV: Regular rate and rhythm without murmur  Respiratory: Clear to auscultation bilaterally  GI: Normoactive bowel sounds; soft, non-tender abdomen  Neuro: No focal deficits appreciated  Extremities: Trace peripheral edema bilaterally    Recent Labs   Lab 23  0744 23  0559 23  0214 23  1223 23  1132 23  1324 23  0548 23  0134 23  1556   WBC  --   --   --   --   --   --  10.4  --  10.0   HGB  --   --   --   --   --   --  12.5  --  12.9   MCV  --   --   --   --   --   --  87  --  85   PLT  --   --   --   --   --   --  290  --  323   NA  --  141  --   --  136  --  139  --  141   POTASSIUM  --  4.3  --   --  3.9  --  4.3  " "--  5.0   CHLORIDE  --  99  --   --  96*  --  103  --  105   CO2  --  30*  --   --  27  --  25  --  23   BUN  --  38.0*  --   --  34.8*  --  31.2*  --  31.9*   CR  --  1.92*  --   --  1.84*  --  1.59*  --  1.62*   GFRESTIMATED  --  24*  --   --  26*  --  31*  --  30*   ANIONGAP  --  12  --   --  13  --  11  --  13   TELLY  --  9.0  --   --  9.0  --  9.2  --  9.4   * 127* 124*   < > 197*   < > 136*   < > 184*   ALBUMIN  --   --   --   --   --   --   --   --  4.0   PROTTOTAL  --   --   --   --   --   --   --   --  7.5   BILITOTAL  --   --   --   --   --   --   --   --  0.4   ALKPHOS  --   --   --   --   --   --   --   --  85   ALT  --   --   --   --   --   --   --   --  10   AST  --   --   --   --   --   --   --   --  14    < > = values in this interval not displayed.     Recent Labs   Lab Test 06/26/23  1421 10/24/22  1245   CHOL 140 145   HDL 45* 44*   LDL 57 52   TRIG 189* 246*     Recent Labs   Lab 12/28/23  0548 12/27/23  1556   WBC 10.4 10.0   HGB 12.5 12.9   HCT 41.9 42.8   MCV 87 85    323     No results for input(s): \"PH\", \"PHV\", \"PO2\", \"PO2V\", \"SAT\", \"PCO2\", \"PCO2V\", \"HCO3\", \"HCO3V\" in the last 168 hours.  Recent Labs   Lab 12/27/23  1556   NTBNPI 4,160*     No results for input(s): \"DD\" in the last 168 hours.  No results for input(s): \"SED\", \"CRP\" in the last 168 hours.  Recent Labs   Lab 12/28/23  0548 12/27/23  1556    323     No results for input(s): \"TSH\" in the last 168 hours.  No results for input(s): \"COLOR\", \"APPEARANCE\", \"URINEGLC\", \"URINEBILI\", \"URINEKETONE\", \"SG\", \"UBLD\", \"URINEPH\", \"PROTEIN\", \"UROBILINOGEN\", \"NITRITE\", \"LEUKEST\", \"RBCU\", \"WBCU\" in the last 168 hours.    Imaging:  Recent Results (from the past 48 hour(s))   Echo Limited   Result Value    LVEF  55-60%    [facility]    [id number]  [id number]  [id number]  [id number]^MAMI^NATALY^SOY     [facility]  Echocardiography Laboratory  [facility] [place] [address]     Name: JOSIE ARRIAGA  MRN: " [id number]  : 1933  Study Date: 2023 10:15 AM  Age: 89 yrs  Gender: Female  Patient Location: [facility]  Reason For Study: Heart Failure  Ordering Physician: NATALY BOLAÑOS  Referring Physician: GUI ERIC  Performed By: Julia Espinoza     BSA: 2.3 m2  Height: 67 in  Weight: 265 lb  HR: 84  BP: 145/77 mmHg  ______________________________________________________________________________  Procedure  Limited Echo Adult. Technically difficult study. Poor acoustic windows.  ______________________________________________________________________________  Interpretation Summary     This was a technically VERY difficult study. Limited echocardiogram.     Normal LV size and systolic function.  Normal RV size and systolic function.  Mitral apparatus is calcified with probably mild to moderate/moderate mitral  stenosis. Valve is not well-seen. Mean gradient is 5 to 7 mmHg.  Aortic valve is not well-seen. As previously described, moderate aortic  stenosis. Maximally obtained mean gradient is 24 mmHg with calculated aortic  valve area of 1.3 cmÂ .     No significant changes compared to echocardiogram 2 months ago.  ______________________________________________________________________________  Left Ventricle  The left ventricle is normal in size. The visual ejection fraction is 55-60%.     Right Ventricle  The right ventricle is normal in size and function.     Atria  The left atrium is mildly dilated.     Mitral Valve  There is severe mitral annular calcification. The mitral valve is not well  visualized. The mitral valve leaflets appear thickened, but open well. There  is trace to mild mitral regurgitation. The mean mitral valve gradient is 5.5  mmHg. Calcified mitral apparatus causing mitral stenosis. There is mild to  moderate mitral stenosis.     Aortic Valve  The aortic valve is not well visualized. The mean AoV pressure gradient is  21.7 mmHg. The calculated aortic valve are is 1.3 cm^2. Moderate  valvular  aortic stenosis. Increased aortic valve velocity. The peak AoV pressure  gradient is 38.0 mmHg.     Vessels  The aortic root is normal size. The inferior vena cava was normal in size with  preserved respiratory variability.     Pericardium  There is no pericardial effusion.  ______________________________________________________________________________  MMode/2D Measurements & Calculations  asc Aorta Diam: 3.4 cm  LVOT diam: 2.1 cm  LVOT area: 3.5 cm2  Asc Ao diam index BSA (cm/m2): 1.5  Asc Ao diam index Ht(cm/m): 2.0  LA Volume (BP): 91.7 ml     LA Volume Index (BP): 40.2 ml/m2     Doppler Measurements & Calculations  MV max P.4 mmHg  MV mean P.5 mmHg  MV V2 VTI: 41.0 cm  MVA(VTI): 2.1 cm2  Ao V2 max: 309.0 cm/sec  Ao max P.0 mmHg  Ao V2 mean: 218.0 cm/sec  Ao mean P.7 mmHg  Ao V2 VTI: 68.5 cm  MEL(I,D): 1.3 cm2  MEL(V,D): 1.2 cm2  LV V1 max P.7 mmHg  LV V1 max: 108.0 cm/sec  LV V1 VTI: 24.6 cm  SV(LVOT): 85.6 ml  SI(LVOT): 37.6 ml/m2  PA acc time: 0.10 sec  AV Julian Ratio (DI): 0.35  MEL Index (cm2/m2): 0.55     ______________________________________________________________________________  Report approved by: Bere Amado 2023 03:31 PM               Medical Decision Making       35 MINUTES SPENT BY ME on the date of service doing chart review, history, exam, documentation & further activities per the note.

## 2023-12-30 NOTE — PLAN OF CARE
Goal Outcome Evaluation:  Neuro- A&OX4  Most Recent Vitals- Temp: 97.5  F (36.4  C) Temp src: Oral BP: 128/88 Pulse: 80   Resp: 18 SpO2: 100 % O2 Device: Nasal cannula   Tele/Cardiac- A-fib with CVR  Resp- 2L NSC  Activity- SBA + walker   Pain- Denies   Drips- none   Drains/Tubes- P-IVX1 SL  Skin- Wound on right shin   GI/- Low urine out put, bladder scan showed 21cc  Aggression Color- Green  COVID status- Negative  Plan- Possible discharge home tomorrow.  [facility] [place]     Mireya Lilly RN

## 2023-12-30 NOTE — PROGRESS NOTES
"[facility]    Medicine Progress Note - Hospitalist Service    Date of Admission:  12/27/2023    Assessment & Plan   Moira Andre is a 89-year-old female with history of asthma, mild chronic kidney disease stage III, heart failure with preserved ejection fraction, diabetes mellitus type 2, hypertension, coronary artery disease, rheumatoid arthritis, obstructive sleep apnea not currently using CPAP, TIA, hyperlipidemia, GERD, atrial fibrillation on chronic anticoagulation with Eliquis come to the ER with complaint of worsening shortness of breath for the last couple of weeks.    Atrial fibrillation with RVR  Chronic anticoagulation with Eliquis  Moira Andre is a 89-year-old female with multiple comorbidities as mentioned above, found to be in A-fib with RVR with a heart rate of 142 on admission. She was started on IV Cardizem infusion with improvement in rate control.  Admitted to inpatient under IMC status.  Cardiology consult requested, I appreciate Dr. Nelson's evaluation and recommendations  Discontinued IMC status, no longer requires diltiazem infusion.  Per Dr. Nelson, \"We have uptitrated dose of metoprolol to 75 mg twice daily.  Further uptitration will possibly be limited by soft blood pressure.  Not a candidate for digoxin due to CKD.\"  Continue PTA Eliquis.  Discontinued Plavix per cardiology recommendations  And o 12/29, \"She is getting diuresed with IV Lasix but had bump in Cr today. We will hold further doses of IV lasix. Start torsemide 40 mg tomorrow. Discharge pending improvement in Cr and response to torsemide.\"    Acute on chronic diastolic congestive heart failure/HFpEF  Moderate aortic stenosis  Moderate mitral stenosis  Patient presented with worsening shortness of breath, dyspnea on exertion, orthopnea, PND, lower extremity edema. Chest x-ray consistent with pulmonary edema, small pleural effusions as well. BNP elevated at 4,160.  Diuretics per Cardiology  Monitor " "I&O and daily weights.  TTE on 12/28/23 showed LVEF 55-60%, moderate MS and AS.  Low salt diet, discussed with patient.  Cardiology consulted as noted above  Per Dr. Thayer, \"Starting torsemide 40 mg daily today  Creatinine slightly higher today, will monitor another day on oral torsemide and recheck creatinine tomorrow, may be able to discharge tomorrow if things are stable.\"  And per Julio Sloan, \"Pt is on social security with fixed income; some HF therapies will be cost prohibitive.\"    Coronary artery disease  NSTEMI type II  Hypertension  Hyperlipidemia  History of coronary artery disease status post stent in the past, she is on Eliquis as well as Plavix.  Stents were done many years ago.  She is not on aspirin because of allergy. Troponin mildly elevated at 33 in the ER, flat on recheck. EKG showed atrial fibrillation with RVR.  Mildly elevated troponin felt to be secondary to A-fib with RVR and CHF and likely demand ischemia rather than ACS at this time.  She has no chest pain.  PTA metoprolol increased to 75 mg PO BID for ventricular rate control.  Continue PTA atorvastatin.  PTA plavix discontinued by cardiology on 12/28/23, no longer indicated    Diabetes mellitus type 2  Hemoglobin A1c 7.9% on 10/26/23.  Resume PTA glimepiride at lower dose  Moderate carbohydrate diet.  Monitor for hypoglycemia.  Blood sugar readings remain at goal and she has needed trivial amounts of corrective dose insulin    Chronic kidney disease, stage IIIb  Her baseline creatinine is between 1.6-1.8.  Monitor renal function with diuresis.    Obstructive sleep apnea  She does have a CPAP, but not using as there is a recall.  CPAP ordered per home settigns in the hospital.    Asthma  No acute exacerbation at this time.  Continue PTA Wixela inhub inhaler or formulary equivalent and PRN albuterol inhaler.    Depression  Anxiety  Continue PTA wellbutrin and cymbalta.    GERD  Continue PTA pantoprazole.      Diet: Fluid " "restriction 1500 ML FLUID  2 Gram Sodium Diet    DVT Prophylaxis: DOAC  Charles Catheter: Not present  Lines: None     Cardiac Monitoring: ACTIVE order. Indication: IMC  Code Status: Full Code      Clinically Significant Risk Factors                  # Hypertension: Noted on problem list       # DMII: A1C = 7.9 % (Ref range: 0.0 - 5.6 %) within past 6 months, PRESENT ON ADMISSION  # Severe Obesity: Estimated body mass index is 40.3 kg/m  as calculated from the following:    Height as of this encounter: 1.702 m (5' 7\").    Weight as of this encounter: 116.7 kg (257 lb 4.8 oz)., PRESENT ON ADMISSION       # Financial/Environmental Concerns: none  # Asthma: noted on problem list        Disposition Plan      Expected Discharge Date: 12/30/2023      Destination: home with help/services            Ivanna Mccurdy MD  Hospitalist Service  [facility]  Securely message with [employer] (more info)  Text page via [employer] Paging/Directory   ______________________________________________________________________    Interval History   \"I washed up and I was pretty winded.\"  Kristyn says she walked on the unit \"down to the blue thing,\" felt short of breath when she returned.  She also had the \"butterfly feeling\" in her chest/midepigastrium with recovery.  She has no new GI complaints.    Physical Exam   Vital Signs: Temp: 97.5  F (36.4  C) Temp src: Oral BP: (!) 135/110 Pulse: 89   Resp: 18 SpO2: 95 % O2 Device: Nasal cannula Oxygen Delivery: 1 LPM  Weight: 257 lbs 4.8 oz    Constitutional: Awake, alert, cooperative, no apparent distress  Respiratory: Tachypneic, lungs are quite clear  Cardiovascular: irregularly irregular rhythm  GI: Normal bowel sounds, soft, non-distended, non-tender  Skin/Integumen: Bilateral lower extremity edema  Other: Mood is concerned      Medical Decision Making       35 MINUTES SPENT BY ME on the date of service doing chart review, history, exam, documentation & further activities per " the note.      Data     I have personally reviewed the following data over the past 24 hrs:    N/A  \   N/A   / N/A     141 99 38.0 (H) /  127 (H)   4.3 30 (H) 1.92 (H) \       Imaging results reviewed over the past 24 hrs:   No results found for this or any previous visit (from the past 24 hour(s)).

## 2023-12-31 ENCOUNTER — DOCUMENTATION ONLY (OUTPATIENT)
Dept: HOME HEALTH SERVICES | Facility: CLINIC | Age: 88
End: 2023-12-31
Payer: COMMERCIAL

## 2023-12-31 VITALS
HEIGHT: 67 IN | WEIGHT: 256 LBS | HEART RATE: 79 BPM | RESPIRATION RATE: 16 BRPM | OXYGEN SATURATION: 88 % | TEMPERATURE: 97.5 F | DIASTOLIC BLOOD PRESSURE: 101 MMHG | BODY MASS INDEX: 40.18 KG/M2 | SYSTOLIC BLOOD PRESSURE: 143 MMHG

## 2023-12-31 LAB
ALBUMIN UR-MCNC: 100 MG/DL
ANION GAP SERPL CALCULATED.3IONS-SCNC: 12 MMOL/L (ref 7–15)
APPEARANCE UR: ABNORMAL
BILIRUB UR QL STRIP: NEGATIVE
BUN SERPL-MCNC: 39 MG/DL (ref 8–23)
CALCIUM SERPL-MCNC: 8.8 MG/DL (ref 8.2–9.6)
CHLORIDE SERPL-SCNC: 99 MMOL/L (ref 98–107)
COLOR UR AUTO: ABNORMAL
CREAT SERPL-MCNC: 1.83 MG/DL (ref 0.51–0.95)
DEPRECATED HCO3 PLAS-SCNC: 28 MMOL/L (ref 22–29)
EGFRCR SERPLBLD CKD-EPI 2021: 26 ML/MIN/1.73M2
GLUCOSE BLDC GLUCOMTR-MCNC: 106 MG/DL (ref 70–99)
GLUCOSE BLDC GLUCOMTR-MCNC: 133 MG/DL (ref 70–99)
GLUCOSE BLDC GLUCOMTR-MCNC: 134 MG/DL (ref 70–99)
GLUCOSE SERPL-MCNC: 139 MG/DL (ref 70–99)
GLUCOSE UR STRIP-MCNC: NEGATIVE MG/DL
HGB UR QL STRIP: ABNORMAL
KETONES UR STRIP-MCNC: NEGATIVE MG/DL
LEUKOCYTE ESTERASE UR QL STRIP: ABNORMAL
NITRATE UR QL: POSITIVE
PH UR STRIP: 6 [PH] (ref 5–7)
POTASSIUM SERPL-SCNC: 4.3 MMOL/L (ref 3.4–5.3)
RBC URINE: >182 /HPF
SODIUM SERPL-SCNC: 139 MMOL/L (ref 135–145)
SP GR UR STRIP: 1.01 (ref 1–1.03)
SQUAMOUS EPITHELIAL: 4 /HPF
UROBILINOGEN UR STRIP-MCNC: NORMAL MG/DL
WBC CLUMPS #/AREA URNS HPF: PRESENT /HPF
WBC URINE: >182 /HPF

## 2023-12-31 PROCEDURE — 81001 URINALYSIS AUTO W/SCOPE: CPT | Performed by: INTERNAL MEDICINE

## 2023-12-31 PROCEDURE — 87086 URINE CULTURE/COLONY COUNT: CPT | Performed by: INTERNAL MEDICINE

## 2023-12-31 PROCEDURE — 36415 COLL VENOUS BLD VENIPUNCTURE: CPT | Performed by: INTERNAL MEDICINE

## 2023-12-31 PROCEDURE — 99239 HOSP IP/OBS DSCHRG MGMT >30: CPT | Performed by: INTERNAL MEDICINE

## 2023-12-31 PROCEDURE — 87186 SC STD MICRODIL/AGAR DIL: CPT | Performed by: INTERNAL MEDICINE

## 2023-12-31 PROCEDURE — 99232 SBSQ HOSP IP/OBS MODERATE 35: CPT | Performed by: INTERNAL MEDICINE

## 2023-12-31 PROCEDURE — 250N000013 HC RX MED GY IP 250 OP 250 PS 637: Performed by: INTERNAL MEDICINE

## 2023-12-31 PROCEDURE — 80048 BASIC METABOLIC PNL TOTAL CA: CPT | Performed by: INTERNAL MEDICINE

## 2023-12-31 PROCEDURE — 250N000013 HC RX MED GY IP 250 OP 250 PS 637: Performed by: NURSE PRACTITIONER

## 2023-12-31 RX ORDER — CEFUROXIME AXETIL 500 MG/1
500 TABLET ORAL 2 TIMES DAILY
Qty: 14 TABLET | Refills: 0 | Status: SHIPPED | OUTPATIENT
Start: 2023-12-31 | End: 2024-01-07

## 2023-12-31 RX ORDER — METOPROLOL TARTRATE 25 MG/1
75 TABLET, FILM COATED ORAL 2 TIMES DAILY
Start: 2023-12-31 | End: 2024-01-11

## 2023-12-31 RX ADMIN — ATORVASTATIN CALCIUM 20 MG: 20 TABLET, FILM COATED ORAL at 09:11

## 2023-12-31 RX ADMIN — DULOXETINE HYDROCHLORIDE 60 MG: 60 CAPSULE, DELAYED RELEASE ORAL at 09:11

## 2023-12-31 RX ADMIN — ACETAMINOPHEN 1000 MG: 500 TABLET, FILM COATED ORAL at 09:12

## 2023-12-31 RX ADMIN — FLUTICASONE FUROATE AND VILANTEROL TRIFENATATE 1 PUFF: 100; 25 POWDER RESPIRATORY (INHALATION) at 09:17

## 2023-12-31 RX ADMIN — GLIMEPIRIDE 2 MG: 2 TABLET ORAL at 09:11

## 2023-12-31 RX ADMIN — MICONAZOLE NITRATE: 2 POWDER TOPICAL at 09:16

## 2023-12-31 RX ADMIN — PANTOPRAZOLE SODIUM 20 MG: 20 TABLET, DELAYED RELEASE ORAL at 09:12

## 2023-12-31 RX ADMIN — APIXABAN 2.5 MG: 2.5 TABLET, FILM COATED ORAL at 09:11

## 2023-12-31 RX ADMIN — METOPROLOL TARTRATE 75 MG: 50 TABLET, FILM COATED ORAL at 09:12

## 2023-12-31 RX ADMIN — BUPROPION HYDROCHLORIDE 100 MG: 100 TABLET, FILM COATED, EXTENDED RELEASE ORAL at 09:12

## 2023-12-31 RX ADMIN — TORSEMIDE 40 MG: 20 TABLET ORAL at 09:12

## 2023-12-31 ASSESSMENT — ACTIVITIES OF DAILY LIVING (ADL)
ADLS_ACUITY_SCORE: 32
ADLS_ACUITY_SCORE: 36
ADLS_ACUITY_SCORE: 32
ADLS_ACUITY_SCORE: 36
ADLS_ACUITY_SCORE: 36

## 2023-12-31 NOTE — PROGRESS NOTES
Oxygen Documentation  I certify that this patient, Moira Andre has been under my care (or a nurse practitioner or physican's assistant working with me). This is the face-to-face encounter for oxygen medical necessity.      At the time of this encounter, I have reviewed the qualifying testing and have determined that supplemental oxygen is reasonable and necessary and is expected to improve the patient's condition in a home setting.       Patient has continued oxygen desaturation due to Chronic Heart Failure I50.    If portability is ordered, is the patient mobile within the home? Yes    Ivanna Mccurdy M.D.  Hospitalist  [facility]  Securely message with the Vocera Web Console (learn more here)  Text page via [employer] Paging/Directory

## 2023-12-31 NOTE — PROGRESS NOTES
Care Management Discharge Note    Discharge Date: 12/31/2023       Discharge Disposition: Home Care    Discharge Services: Other (see comment)    Discharge DME: None    Discharge Transportation: family or friend will provide    Private pay costs discussed: Not applicable    Does the patient's insurance plan have a 3 day qualifying hospital stay waiver?  No    PAS Confirmation Code:    Patient/family educated on Medicare website which has current facility and service quality ratings: no    Education Provided on the Discharge Plan: Yes  Persons Notified of Discharge Plans: home care  Patient/Family in Agreement with the Plan: yes    Handoff Referral Completed: Yes    Additional Information:  [facility] at [phone] for discharge.    Almaz Ceja RN   [facility]   Phone [phone]

## 2023-12-31 NOTE — PROGRESS NOTES
Occupational Therapy Discharge Summary    Reason for therapy discharge:    Discharged to home with home therapy.    Progress towards therapy goal(s). See goals on Care Plan in [facility] electronic health record for goal details.  Goals partially met.  Barriers to achieving goals:   limited tolerance for therapy and discharge from facility.    Therapy recommendation(s):    Continued therapy is recommended.  Rationale/Recommendations:  Recommend continued OT at home to increase endurance and independence in ADLS.

## 2023-12-31 NOTE — PLAN OF CARE
A&O x 4. VSS. 1-2L O2 NC. Tele: Afib CVR. Denies pain. Up SBA w/walker. PIV removed without incident. Pt discharged to home w/home O2. Discharge instructions given to patient, questions were answered.  Home Medical provided oxygen equipment & instructions to pt. Discharge medication given to pt, pt to  new medication at [facility]. Pt ordered taxi to transfer to home, with pts friend, Marleen, meeting pt at pt's apartment. Pt escorted to door six via wheelchair and hospital staff.

## 2023-12-31 NOTE — PROGRESS NOTES
Received intake call for home oxygen at 1:08PM. Reviewed patient's chart; Patient qualifies under insurance guidelines and all documentation is in the chart including a good order.     1:20PM- Spoke with care coordinator, Brandy, confirmed we received the order, and provided them with ETA of oxygen.     1:42PM   Called to offer choice and patient is okay with [facility] setting them up. Discussed equipment with patient and informed them that we would be to bedside with oxygen in the next 4 hours.

## 2023-12-31 NOTE — PLAN OF CARE
Goal Outcome Evaluation:    Plan Of Care Reviewed With: Patient     Pt A/Ox4 calm and cooperative. Up in chair the majority of the shift, transfers with SBA and walker. Vitals stable and cardiac rhythm remains AF CVR, on Eliquis. Tolerating 2gm Na diet with 1500ml FR. BGM's were 141 and 247, sliding scale Insulin. Pt continues on IV ABX for pneumonia. Torsemide started today. Excoriated groin fold. Possible discharge home tomorrow.

## 2023-12-31 NOTE — PROGRESS NOTES
[facility]    Cardiology Consultation - Progress Note     Date of Admission:  12/27/2023  Date of Service: 12/30/2023    Reason for Consult   Reason for consult: heart failure management    History of Present Illness   Moira Andre is a 89 year old female who was admitted on 12/27/2023 for shortness of breath. Medical comorbidities include atrial fibrillation, heart failure with preserved ejection fraction, coronary artery disease, hypertension, diabetes mellitus type 2, TIA, hyperlipidemia, mild chronic kidney disease stage III, rheumatoid arthritis, obstructive sleep apnea not currently using CPAP, asthma, and GERD. She was found to be volume overloaded and has been diuresed.     Assessment & Plan   1.  Acute exacerbation of diastolic heart failure  - TTE completed here, difficult imaging study --> EF 55-60%.  MV MG 5-7 mmHg.  No change from prior.  - Improved weight with diuresis, 257# today.  Pt unsure of EDW.  - Still with persistent dyspnea and LE edema (R>L).  - On Lopressor 50 mg twice daily; no ACEi/ARB 2/2 past side effects.  - On long-term Clopidogrel PTA, now discontinued this hospitalization  2.  Atrial fibrillation with rapid ventricular response  - Sub-optimal rates, on BB.  - Chronic anticoagulation for high [id number] score.  - On Eliquis 2.5 mg BID (started Fall 2023).  4.  Coronary artery disease  - Hx remote stenting-no angina, troponin flat, RI/D1 PCI in 2007, Cath in 2017  50-60% pCx lesion, IFR and FFR unremarkable at 0.96 and 0.89 respectively. No other lesions over 40%.  - Pt notes delayed right sided wound healing --> consider PAD evaluation in the future?  5.  Untreated sleep apnea  6.  Moderate mitral and aortic stenosis-unchanged on repeat echo.    Plan:  Continue torsemide 40 mg daily, Eliquis 2.5 mg BID, atorvastatin 20 mg daily, metoprolol tartrate 75 mg BID  PTA Plavix discontinued while on Eliquis  Continue telemetry and HF support (daily standing  weight, strict I/O, Na restriction)  Ambulation as tolerated  Weight and creatinine stable on oral torsemide. From a cardiology perspective she appears stable for discharge, although she does report brown urine this morning and will likely need that investigated by hospitalist, not starting an SGLT-2 inhibitor while concerns of possible urine issues. She needs to have her supplemental O2 weaned as well (satting 99% on 2L, can likely decrease or stop).   Cardiology will sign-off a this time, please call if further questions arise. We will arrange follow-up in 2-4 weeks with AMY.    Inocente Thayer MD    Primary Care Physician   Maryan Churchill    Patient Active Problem List   Diagnosis    CKD (chronic kidney disease) stage 3, GFR 30-59 ml/min (H)    Morbid obesity (H)    CAD (coronary artery disease)    Type 2 diabetes mellitus with diabetic nephropathy (H)    Transient cerebral ischemia    Hyperlipidemia LDL goal <70    Ventral hernia    Intermittent asthma    Moderate major depression (H)    ELIGIO on CPAP    Microalbuminuria    S/P total knee arthroplasty    OA (osteoarthritis) of knee    Anemia due to blood loss, acute    Health Care Home    Essential hypertension    Gastroesophageal reflux disease without esophagitis    Bilateral edema of lower extremity    Osteoarthritis    High risk medication use    Anxiety    Other chronic pain    Controlled substance agreement signed    Edema of lower extremity    Nephrolithiasis    Kidney stone    Tremor    Atypical chest pain    ACS (acute coronary syndrome) (H)    Non-rheumatic mitral valve stenosis    Coronary artery calcification seen on CAT scan    Lumbar radiculopathy    Back muscle spasm    Obesity hypoventilation syndrome (H)    CPAP/BiPAP dependent    Spinal stenosis of lumbosacral region    Bilateral hand pain    Primary osteoarthritis involving multiple joints    Atrial fibrillation with RVR (H)    Hyponatremia    Atrial fibrillation with rapid  ventricular response (H)    Upper back pain       Past Medical History   I have reviewed this patient's medical history and updated it with pertinent information if needed.   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 Taking?   DULoxetine (CYMBALTA) 60 MG capsule 12/26/2023 at PM Self No Yes   Sig: Take 1 capsule (60 mg) by mouth daily   Menthol (Topical Analgesic) 7.5 % (Roll) MISC 12/26/2023 Self Yes Yes   Sig: Apply to affected area every morning   Multiple Vitamins-Minerals (HAIR SKIN AND NAILS FORMULA PO) 12/26/2023 at PM Self Yes Yes   Sig: Take 1 tablet by mouth daily   Vitamin D3 (CHOLECALCIFEROL) 25 mcg (1000 units) tablet 12/26/2023 at PM Self Yes Yes   Sig: Take 25 mcg by mouth daily   acetaminophen (TYLENOL) 500 MG tablet 12/26/2023 at PM Self Yes Yes   Sig: Take 1,000 mg by mouth 2 times daily   albuterol (PROAIR HFA/PROVENTIL HFA/VENTOLIN HFA) 108 (90 Base) MCG/ACT inhaler 12/27/2023 Self Yes Yes   Sig: Inhale 2 puffs into the lungs every 6 hours as needed for shortness of breath, wheezing or cough For shortness of breath   apixaban ANTICOAGULANT (ELIQUIS) 2.5 MG tablet 12/26/2023 at PM Self No Yes   Sig: Take 1 tablet (2.5 mg) by mouth 2 times daily   atorvastatin (LIPITOR) 20 MG tablet 12/26/2023 at PM Self No Yes   Sig: TAKE 1 TABLET BY MOUTH DAILY FOR CHOLESTEROL   buPROPion (WELLBUTRIN SR) 100 MG 12 hr tablet 12/26/2023 at PM Self No Yes   Sig: Take 1 tablet (100 mg) by mouth daily for mood   calcium carbonate (TUMS) 500 MG chewable tablet  at PRN Self Yes Yes   Sig: Take 1 chew tab by mouth 2 times daily as needed for heartburn   camphor-menthol-methyl salicylate 3.1-6-10 % PTCH  at PRN Self No Yes   Sig: Apply 1 patch topically as needed (hand pain) Or back pain   clopidogrel (PLAVIX) 75 MG tablet 12/26/2023 at PM Self No No   Sig: TAKE 1 TABLET BY MOUTH DAILY   fexofenadine (ALLEGRA) 180 MG tablet 12/26/2023 at PM Self Yes Yes   Sig: Take 180 mg by mouth every evening   fluticasone (FLONASE) 50 MCG/ACT nasal spray  at PRN Self Yes No   Sig: Spray 1 spray into both nostrils daily as needed    fluticasone-salmeterol (WIXELA INHUB) 100-50 MCG/ACT inhaler  12/26/2023 at AM Self No Yes   Sig: USE 1 INHALATION BY MOUTH EVERY  12 HOURS   Patient taking differently: Inhale 1 puff into the lungs daily   furosemide (LASIX) 20 MG tablet 12/26/2023 at PM Self No No   Sig: Take 1 tablet (20 mg) by mouth daily   glimepiride (AMARYL) 2 MG tablet 12/26/2023 at PM Self No Yes   Sig: Take 1 tablet (2 mg) by mouth 2 times daily (before meals)   metoprolol tartrate (LOPRESSOR) 25 MG tablet 12/26/2023 at PM Self No Yes   Sig: Take 1 tablet (25 mg) by mouth 2 times daily   nitroGLYcerin (NITROSTAT) 0.4 MG sublingual tablet  at PRN Self No Yes   Sig: FOR CHEST PAIN PLACE 1 TABLET  UNDER TONGUE EVERY 5 MINUTES FOR 3 DOSES. IF SYMPTOMS PERSIST 5  MINUTES AFTER 1ST DOSE CALL 911   pantoprazole (PROTONIX) 20 MG EC tablet 12/26/2023 at AM Self No Yes   Sig: Take 1 tablet (20 mg) by mouth daily      Facility-Administered Medications: None     Current Facility-Administered Medications   Medication Dose Route Frequency    acetaminophen  1,000 mg Oral BID    apixaban ANTICOAGULANT  2.5 mg Oral BID    atorvastatin  20 mg Oral Daily    buPROPion  100 mg Oral Daily    DULoxetine  60 mg Oral Daily    fexofenadine  180 mg Oral QPM    fluticasone-vilanterol  1 puff Inhalation Daily    glimepiride  2 mg Oral Daily with breakfast    insulin aspart  1-5 Units Subcutaneous At Bedtime    insulin aspart  1-7 Units Subcutaneous TID AC    metoprolol tartrate  75 mg Oral BID    miconazole   Topical BID    pantoprazole  20 mg Oral Daily    sodium chloride (PF)  3 mL Intracatheter Q8H    torsemide  40 mg Oral Daily     Current Facility-Administered Medications   Medication Last Rate    Continuing ACE inhibitor/ARB/ARNI from home medication list OR ACE inhibitor/ARB order already placed during this visit      - MEDICATION INSTRUCTIONS -       Allergies   Allergies   Allergen Reactions    Aspirin Hives     Reaction occurred during childhood.     Lisinopril Cough    Losartan      Hyperkalemia      Metformin       "Elevated lactic acid    Minocycline      Yellow Dye Allergy. Minocycline has Yellow Dye #10.    Mounjaro [Tirzepatide] Diarrhea and GI Disturbance    Salicylates Hives    Yellow Dye Hives     Rxn to yellow tablet. Eyes swelled shut.     Yellow Dyes (Non-Tartrazine) Hives       Social History    reports that she quit smoking about 50 years ago. Her smoking use included cigarettes. She started smoking about 52 years ago. She has a 0.3 pack-year smoking history. She has never used smokeless tobacco. She reports current alcohol use. She reports that she does not use drugs.    Family History   Family History   Problem Relation Age of Onset    Neurologic Disorder Mother         MS - at 60's    C.A.D. Father          at 8o's, ? prostate ca    Breast Cancer No family hx of     Cancer - colorectal No family hx of        Review of Systems   The comprehensive Review of Systems is negative other than noted in the HPI or here.     Physical Exam   Vital Signs with Ranges  Temp:  [96.6  F (35.9  C)-98.6  F (37  C)] 97.5  F (36.4  C)  Pulse:  [66-81] 66  Resp:  [16-18] 16  BP: (116-158)/() 140/100  SpO2:  [86 %-100 %] 96 %  Wt Readings from Last 4 Encounters:   23 116.1 kg (256 lb)   23 125.8 kg (277 lb 4.8 oz)   23 127.8 kg (281 lb 12.8 oz)   23 126 kg (277 lb 12.5 oz)     I/O last 3 completed shifts:  In: 180 [P.O.:180]  Out: 975 [Urine:975]      Vitals: BP (!) 140/100 (BP Location: Left arm, Patient Position: Semi-Roe's, Cuff Size: Adult Regular)   Pulse 66   Temp 97.5  F (36.4  C) (Oral)   Resp 16   Ht 1.702 m (5' 7\")   Wt 116.1 kg (256 lb)   SpO2 96%   BMI 40.10 kg/m      General: Alert, oriented, in no acute distress  HEENT: PERRLA, mucous membranes moist  Neck: Normal JVP  CV: Regular rate and rhythm without murmur  Respiratory: Clear to auscultation bilaterally  GI: Normoactive bowel sounds; soft, non-tender abdomen  Neuro: No focal deficits appreciated  Extremities: Trace " "peripheral edema bilaterally    Recent Labs   Lab 12/31/23  0617 12/31/23  0147 12/30/23  2049 12/30/23  0744 12/30/23  0559 12/29/23  1223 12/29/23  1132 12/28/23  1324 12/28/23  0548 12/28/23  0134 12/27/23  1556   WBC  --   --   --   --   --   --   --   --  10.4  --  10.0   HGB  --   --   --   --   --   --   --   --  12.5  --  12.9   MCV  --   --   --   --   --   --   --   --  87  --  85   PLT  --   --   --   --   --   --   --   --  290  --  323     --   --   --  141  --  136  --  139  --  141   POTASSIUM 4.3  --   --   --  4.3  --  3.9  --  4.3  --  5.0   CHLORIDE 99  --   --   --  99  --  96*  --  103  --  105   CO2 28  --   --   --  30*  --  27  --  25  --  23   BUN 39.0*  --   --   --  38.0*  --  34.8*  --  31.2*  --  31.9*   CR 1.83*  --   --   --  1.92*  --  1.84*  --  1.59*  --  1.62*   GFRESTIMATED 26*  --   --   --  24*  --  26*  --  31*  --  30*   ANIONGAP 12  --   --   --  12  --  13  --  11  --  13   TELLY 8.8  --   --   --  9.0  --  9.0  --  9.2  --  9.4   * 106* 247*   < > 127*   < > 197*   < > 136*   < > 184*   ALBUMIN  --   --   --   --   --   --   --   --   --   --  4.0   PROTTOTAL  --   --   --   --   --   --   --   --   --   --  7.5   BILITOTAL  --   --   --   --   --   --   --   --   --   --  0.4   ALKPHOS  --   --   --   --   --   --   --   --   --   --  85   ALT  --   --   --   --   --   --   --   --   --   --  10   AST  --   --   --   --   --   --   --   --   --   --  14    < > = values in this interval not displayed.     Recent Labs   Lab Test 06/26/23  1421 10/24/22  1245   CHOL 140 145   HDL 45* 44*   LDL 57 52   TRIG 189* 246*     Recent Labs   Lab 12/28/23  0548 12/27/23  1556   WBC 10.4 10.0   HGB 12.5 12.9   HCT 41.9 42.8   MCV 87 85    323     No results for input(s): \"PH\", \"PHV\", \"PO2\", \"PO2V\", \"SAT\", \"PCO2\", \"PCO2V\", \"HCO3\", \"HCO3V\" in the last 168 hours.  Recent Labs   Lab 12/27/23  1556   NTBNPI 4,160*     No results for input(s): \"DD\" in the last 168 " "hours.  No results for input(s): \"SED\", \"CRP\" in the last 168 hours.  Recent Labs   Lab 23  0548 23  1556    323     No results for input(s): \"TSH\" in the last 168 hours.  No results for input(s): \"COLOR\", \"APPEARANCE\", \"URINEGLC\", \"URINEBILI\", \"URINEKETONE\", \"SG\", \"UBLD\", \"URINEPH\", \"PROTEIN\", \"UROBILINOGEN\", \"NITRITE\", \"LEUKEST\", \"RBCU\", \"WBCU\" in the last 168 hours.    Imaging:  Recent Results (from the past 48 hour(s))   Echo Limited   Result Value    LVEF  55-60%    Narrative    [id number]  [id number]  [id number]  [id number]^MAMI^NATALY^SOY     [facility]  Echocardiography Laboratory  [address] [place] [id number]     Name: JOSIE ARRIAGA  MRN: [id number]  : 1933  Study Date: 2023 10:15 AM  Age: 89 yrs  Gender: Female  Patient Location: [facility]  Reason For Study: Heart Failure  Ordering Physician: NATALY BOLAÑOS  Referring Physician: GUI ERIC  Performed By: Julia Espinoza     BSA: 2.3 m2  Height: 67 in  Weight: 265 lb  HR: 84  BP: 145/77 mmHg  ______________________________________________________________________________  Procedure  Limited Echo Adult. Technically difficult study. Poor acoustic windows.  ______________________________________________________________________________  Interpretation Summary     This was a technically VERY difficult study. Limited echocardiogram.     Normal LV size and systolic function.  Normal RV size and systolic function.  Mitral apparatus is calcified with probably mild to moderate/moderate mitral  stenosis. Valve is not well-seen. Mean gradient is 5 to 7 mmHg.  Aortic valve is not well-seen. As previously described, moderate aortic  stenosis. Maximally obtained mean gradient is 24 mmHg with calculated aortic  valve area of 1.3 cmÂ .     No significant changes compared to echocardiogram 2 months ago.  ______________________________________________________________________________  Left Ventricle  The left " ventricle is normal in size. The visual ejection fraction is 55-60%.     Right Ventricle  The right ventricle is normal in size and function.     Atria  The left atrium is mildly dilated.     Mitral Valve  There is severe mitral annular calcification. The mitral valve is not well  visualized. The mitral valve leaflets appear thickened, but open well. There  is trace to mild mitral regurgitation. The mean mitral valve gradient is 5.5  mmHg. Calcified mitral apparatus causing mitral stenosis. There is mild to  moderate mitral stenosis.     Aortic Valve  The aortic valve is not well visualized. The mean AoV pressure gradient is  21.7 mmHg. The calculated aortic valve are is 1.3 cm^2. Moderate valvular  aortic stenosis. Increased aortic valve velocity. The peak AoV pressure  gradient is 38.0 mmHg.     Vessels  The aortic root is normal size. The inferior vena cava was normal in size with  preserved respiratory variability.     Pericardium  There is no pericardial effusion.  ______________________________________________________________________________  MMode/2D Measurements & Calculations  asc Aorta Diam: 3.4 cm  LVOT diam: 2.1 cm  LVOT area: 3.5 cm2  Asc Ao diam index BSA (cm/m2): 1.5  Asc Ao diam index Ht(cm/m): 2.0  LA Volume (BP): 91.7 ml     LA Volume Index (BP): 40.2 ml/m2     Doppler Measurements & Calculations  MV max P.4 mmHg  MV mean P.5 mmHg  MV V2 VTI: 41.0 cm  MVA(VTI): 2.1 cm2  Ao V2 max: 309.0 cm/sec  Ao max P.0 mmHg  Ao V2 mean: 218.0 cm/sec  Ao mean P.7 mmHg  Ao V2 VTI: 68.5 cm  MEL(I,D): 1.3 cm2  MEL(V,D): 1.2 cm2  LV V1 max P.7 mmHg  LV V1 max: 108.0 cm/sec  LV V1 VTI: 24.6 cm  SV(LVOT): 85.6 ml  SI(LVOT): 37.6 ml/m2  PA acc time: 0.10 sec  AV Julian Ratio (DI): 0.35  MEL Index (cm2/m2): 0.55     ______________________________________________________________________________  Report approved by: Bere Amado 2023 03:31 PM               Medical Decision Making       35  MINUTES SPENT BY ME on the date of service doing chart review, history, exam, documentation & further activities per the note.

## 2023-12-31 NOTE — DISCHARGE SUMMARY
"[facility]  Hospitalist Discharge Summary      Date of Admission:  12/27/2023  Date of Discharge:  12/31/2023  Discharging Provider: Ivanna Mccurdy MD  Discharge Service: Hospitalist Service    Discharge Diagnoses   Atrial fibrillation with RVR  Chronic anticoagulation with Eliquis  Acute on chronic diastolic congestive heart failure/HFpEF  Acute hypoxic respiratory failure due to acute exacerbation of chronic diastolic heart failure, likely obesity hypoventilation and untreated obstructive sleep apnea  Moderate aortic stenosis  Moderate mitral stenosis  Coronary artery disease  NSTEMI type II  Microscopic hematuria, possibly due to UTI  Hypertension  Hyperlipidemia  Type 2 diabetes mellitus  Stage IIIb chronic kidney disease  Obstructive sleep apnea  Asthma  Depression  Anxiety  GERD    Clinically Significant Risk Factors     # DMII: A1C = 7.9 % (Ref range: 0.0 - 5.6 %) within past 6 months  # Severe Obesity: Estimated body mass index is 40.1 kg/m  as calculated from the following:    Height as of this encounter: 1.702 m (5' 7\").    Weight as of this encounter: 116.1 kg (256 lb).       Follow-ups Needed After Discharge   Follow-up Appointments     Follow-up and recommended labs and tests       Follow up with primary care provider, Maryan Churchill, within 7 days to   evaluate medication change and for hospital follow- up.  The following   labs/tests are recommended: BMP.            Unresulted Labs Ordered in the Past 30 Days of this Admission       Date and Time Order Name Status Description    12/31/2023 11:41 AM Urine Culture In process         These results will be followed up by primary MD    Discharge Disposition   Discharged to home with home health care  Condition at discharge: Stable    Hospital Course   Moira Andre is a 89-year-old female with history of asthma, mild chronic kidney disease stage III, heart failure with preserved ejection fraction, diabetes mellitus type 2, " "hypertension, coronary artery disease, rheumatoid arthritis, obstructive sleep apnea not currently using CPAP, TIA, hyperlipidemia, GERD, atrial fibrillation on chronic anticoagulation with Eliquis come to the ER with complaint of worsening shortness of breath for the last couple of weeks.    Atrial fibrillation with RVR  Chronic anticoagulation with Eliquis  Moira Andre is a 89-year-old female with multiple comorbidities as mentioned above, found to be in A-fib with RVR with a heart rate of 142 on admission. She was started on IV Cardizem infusion with improvement in rate control.  Admitted to inpatient under IMC status.  Cardiology consult requested, I appreciate Dr. Nelson's evaluation and recommendations  Discontinued IMC status, no longer requires diltiazem infusion.  Per Dr. Nelson, \"We have uptitrated dose of metoprolol to 75 mg twice daily.  Further uptitration will possibly be limited by soft blood pressure.  Not a candidate for digoxin due to CKD.\"  Continue PTA Eliquis.  Discontinued Plavix per cardiology recommendations  And o 12/29, \"She is getting diuresed with IV Lasix but had bump in Cr today. We will hold further doses of IV lasix. Start torsemide 40 mg tomorrow. Discharge pending improvement in Cr and response to torsemide.\"    Acute on chronic diastolic congestive heart failure/HFpEF  Acute hypoxic respiratory failure, probably due to decompensated congestive heart failure, possible obesity hypoventilation and untreated obstructive sleep apnea  Moderate aortic stenosis  Moderate mitral stenosis  Patient presented with worsening shortness of breath, dyspnea on exertion, orthopnea, PND, lower extremity edema. Chest x-ray consistent with pulmonary edema, small pleural effusions as well. BNP elevated at 4,160.  Diuretics per Cardiology  Monitor I&O and daily weights.  TTE on 12/28/23 showed LVEF 55-60%, moderate MS and AS.  Low salt diet, discussed with patient.  Cardiology consulted as noted " "above  Per Dr. Thayer, \"Starting torsemide 40 mg daily today  Creatinine slightly higher today, will monitor another day on oral torsemide and recheck creatinine tomorrow, may be able to discharge tomorrow if things are stable.\"  And per Julio Sloan, \"Pt is on social security with fixed income; some HF therapies will be cost prohibitive.\"  Needs supplemental oxygen, see assessment for home oxygen and nursing notes, oxygen saturation was 83% on room air with activity/exercise    Coronary artery disease  NSTEMI type II  Hypertension  Hyperlipidemia  History of coronary artery disease status post stent in the past, she is on Eliquis as well as Plavix.  Stents were done many years ago.  She is not on aspirin because of allergy. Troponin mildly elevated at 33 in the ER, flat on recheck. EKG showed atrial fibrillation with RVR.  Mildly elevated troponin felt to be secondary to A-fib with RVR and CHF and likely demand ischemia rather than ACS at this time.  She has no chest pain.  PTA metoprolol increased to 75 mg PO BID for ventricular rate control.  Continue PTA atorvastatin.  PTA plavix discontinued by cardiology on 12/28/23, no longer indicated    Diabetes mellitus type 2  Hemoglobin A1c 7.9% on 10/26/23.  Resumed PTA glimepiride at lower dose in hospital, full dose at discharge  Moderate carbohydrate diet.  Monitor for hypoglycemia.  Blood sugar readings remain at goal and she has needed trivial amounts of corrective dose insulin    Chronic kidney disease, stage IIIb  Microscopic hematuria possibly due to UTI  Her baseline creatinine is between 1.6-1.8.  Monitor renal function with diuresis.  This morning, patient reported that her urine looked, \"brown.\"  She denies dysuria urgency frequency, has essentially no symptoms suggesting cystitis or other infection.  Urinalysis has significant microscopic hematuria, not unexpected in patient on chronic anticoagulants. It also shows heavy pyuria.  She is afebrile " with normal WBC.  She has a recent history of kidney stones and has seen urology, scheduled to follow-up with them.  She also has a history of recent urinary tract infection, urine culture from 11/25/2023 is positive for 2 strains of pansensitive E. coli, treated with ceftriaxone followed by cefuroxime  Treat with cefuroxime 500 mg twice daily x 7 days  Follow-up with urology as previously scheduled, see discharge orders    Obstructive sleep apnea  She does have a CPAP, but not using as there is a recall.  CPAP ordered per home settigns in the hospital.    Asthma  No acute exacerbation at this time.  Continue PTA Wixela inhub inhaler or formulary equivalent and PRN albuterol inhaler.    Depression  Anxiety  Continue PTA wellbutrin and cymbalta.    GERD  Continue PTA pantoprazole.    Consultations This Hospital Stay   CARDIOLOGY IP CONSULT  PHYSICAL THERAPY ADULT IP CONSULT  OCCUPATIONAL THERAPY ADULT IP CONSULT  CARE MANAGEMENT / SOCIAL WORK IP CONSULT  CORE CLINIC EVALUATION IP CONSULT  OCCUPATIONAL THERAPY ADULT IP CONSULT  NUTRITION SERVICES ADULT IP CONSULT  CARE MANAGEMENT / SOCIAL WORK IP CONSULT  PHARMACY LIAISON FOR MEDICATION COVERAGE CONSULT    Code Status   Full Code    Time Spent on this Encounter   I, vIanna Mccurdy MD, personally saw the patient today and spent greater than 30 minutes discharging this patient.       Ivanna Mccurdy MD  [facility]  [address]  [facility] [address]  Phone: [phone]  ______________________________________________________________________    Physical Exam   Vital Signs: Temp: 97.5  F (36.4  C) Temp src: Oral BP: (!) 143/101 Pulse: 79   Resp: 16 SpO2: (!) 88 % O2 Device: None (Room air) Oxygen Delivery: 2 LPM  Weight: 256 lbs 0 oz  I saw and examined the patient on the date of discharge.           Primary Care Physician   Maryan Churchill    Discharge Orders   Discharge Procedure Orders   Follow-Up with Cardiology  "AMY   Standing Status: Future   Referral Priority: Routine: Next available opening Referral Type: Consultation   Requested Specialty: Cardiovascular Disease   Number of Visits Requested: 1     Home Care Referral   Referral Priority: Routine: Next available opening Referral Type: Home Health Therapies & Aides   Number of Visits Requested: 1     Follow-Up with Cardiology AMY   Standing Status: Future   Referral Priority: Routine: Next available opening Referral Type: Consultation   Requested Specialty: Cardiovascular Disease   Number of Visits Requested: 1     Reason for your hospital stay   Order Comments: You were short of breath due to irregular heart rhythm (\"atrial fib\").     Activity   Order Comments: Your activity upon discharge: activity as tolerated     Order Specific Question Answer Comments   Is discharge order? Yes      Resume Home Care Services     Follow-up and recommended labs and tests    Order Comments: Follow up with primary care provider, Maryan Churchill, within 7 days to evaluate medication change and for hospital follow- up.  The following labs/tests are recommended: BMP. Follow up with [facility] [place] [address] regarding ongoing hematuria, kidney stones and recurrent urinary tract infection.  Call [phone] for appointment.     Oxygen Adult/Peds   Order Comments: Oxygen Documentation  I certify that this patient, Moira Andre has been under my care (or a nurse practitioner or physican's assistant working with me). This is the face-to-face encounter for oxygen medical necessity.      At the time of this encounter, I have reviewed the qualifying testing and have determined that supplemental oxygen is reasonable and necessary and is expected to improve the patient's condition in a home setting.       Patient has continued oxygen desaturation due to Chronic Heart Failure I50.    If portability is ordered, is the patient mobile within the home? yes "     Order Specific Question Answer Comments   Medical Equipment (DME) Supplier: [facility]    PATIENT INSTRUCTIONS: If you did not receive this ordered item today, please contact [employer] [facility] for availability ([place] Locations: [phone], [place]: [phone]).    Start Date: 2023    Type: New/Recertification    Oxygen Consult Reqt: Call [employer] [facility] before patient leaves at [phone]    Did the patient have SpO2 (sat) testing (only needed for new oxgyen or liter flow changes)? Yes    Length of Need: 12 Months    Frequency of Use: Continuous    Mode of Delivery - Continuous Nasal Cannula    Liter Flow - Continuous (LPM): 2    Need for Portable Oxygen Equipment: Yes    Evaluate for Conserving Device: Yes    Maintain Sats >= 90%    The face to face evaluation was performed on: 2023    Patient disposition: Home    Does patient have oxygen at home? No    Peak Flow Meter: Yes      Diet   Order Comments: Follow this diet upon discharge: Orders Placed This Encounter      Fluid restriction 1500 ML FLUID      2 Gram Sodium Diet     Order Specific Question Answer Comments   Is discharge order? Yes                Significant Results and Procedures   Most Recent 3 CBC's:  Recent Labs   Lab Test 23  0548 23  1556 23  0644   WBC 10.4 10.0 10.1   HGB 12.5 12.9 12.1   MCV 87 85 87    323 295     Most Recent 3 BMP's:  Recent Labs   Lab Test 23  1332 23  0905 23  0617 23  0744 23  0559 23  1223 23  1132   NA  --   --  139  --  141  --  136   POTASSIUM  --   --  4.3  --  4.3  --  3.9   CHLORIDE  --   --  99  --  99  --  96*   CO2  --   --  28  --  30*  --  27   BUN  --   --  39.0*  --  38.0*  --  34.8*   CR  --   --  1.83*  --  1.92*  --  1.84*   ANIONGAP  --   --  12  --  12  --  13   TELLY  --   --  8.8  --  9.0  --  9.0   * 134* 139*   < > 127*   < > 197*    < > = values in this  interval not displayed.     Most Recent 3 BNP's:  Recent Labs   Lab Test 23  1556 23  1215 10/10/23  1438   NTBNPI 4,160* 3,302* 4,629*     7-Day Micro Results       Collected Updated Procedure Result Status      2023 1120 2023 1141 Urine Culture [[id number]]   Urine, Clean Catch    In process Component Value   No component results                     Most Recent Hemoglobin A1c:  Recent Labs   Lab Test 10/26/23  1342   A1C 7.9*     Most Recent Urinalysis:  Recent Labs   Lab Test 23  1120 23  1253 23  1007   COLOR Brown*   < > Yellow   APPEARANCE Cloudy*   < > Slightly Cloudy*   URINEGLC Negative   < > Negative   URINEBILI Negative   < > Negative   URINEKETONE Negative   < > Negative   SG 1.012   < > 1.015   UBLD Large*   < > Small*   URINEPH 6.0   < > 5.5   PROTEIN 100*   < > 100*   UROBILINOGEN  --   --  0.2   NITRITE Positive*   < > Positive*   LEUKEST Large*   < > Moderate*   RBCU >182*   < > 2-5*   WBCU >182*   < > 25-50*    < > = values in this interval not displayed.   ,   Results for orders placed or performed during the hospital encounter of 23   Chest XR,  PA & LAT    Narrative    EXAM: XR CHEST 2 VIEWS  LOCATION: [facility]  DATE: 2023    INDICATION: Shortness of breath.  COMPARISON: 10/10/2023.      Impression    IMPRESSION: Mild cardiac silhouette enlargement, small pleural effusions and vascular indistinctness suggests pulmonary edema and CHF. Mild basilar atelectasis. Aortic atherosclerosis.     Echo Limited     Value    LVEF  55-60%    Narrative    [id number]  [id number]  [id number]  [id number]^MAMI^NATALY^[facility]  Echocardiography Laboratory  [address] [place], [place] [id number]     Name: JOSIE ARRIAGA  MRN: [id number]  : 1933  Study Date: 2023 10:15 AM  Age: 89 yrs  Gender: Female  Patient Location: [facility]  Reason For Study: Heart Failure  Ordering Physician: NATALY BOLAÑOS  SOY  Referring Physician: GUI ERIC  Performed By: Julia Espinoza     BSA: 2.3 m2  Height: 67 in  Weight: 265 lb  HR: 84  BP: 145/77 mmHg  ______________________________________________________________________________  Procedure  Limited Echo Adult. Technically difficult study. Poor acoustic windows.  ______________________________________________________________________________  Interpretation Summary     This was a technically VERY difficult study. Limited echocardiogram.     Normal LV size and systolic function.  Normal RV size and systolic function.  Mitral apparatus is calcified with probably mild to moderate/moderate mitral  stenosis. Valve is not well-seen. Mean gradient is 5 to 7 mmHg.  Aortic valve is not well-seen. As previously described, moderate aortic  stenosis. Maximally obtained mean gradient is 24 mmHg with calculated aortic  valve area of 1.3 cmÂ .     No significant changes compared to echocardiogram 2 months ago.  ______________________________________________________________________________  Left Ventricle  The left ventricle is normal in size. The visual ejection fraction is 55-60%.     Right Ventricle  The right ventricle is normal in size and function.     Atria  The left atrium is mildly dilated.     Mitral Valve  There is severe mitral annular calcification. The mitral valve is not well  visualized. The mitral valve leaflets appear thickened, but open well. There  is trace to mild mitral regurgitation. The mean mitral valve gradient is 5.5  mmHg. Calcified mitral apparatus causing mitral stenosis. There is mild to  moderate mitral stenosis.     Aortic Valve  The aortic valve is not well visualized. The mean AoV pressure gradient is  21.7 mmHg. The calculated aortic valve are is 1.3 cm^2. Moderate valvular  aortic stenosis. Increased aortic valve velocity. The peak AoV pressure  gradient is 38.0 mmHg.     Vessels  The aortic root is normal size. The inferior vena cava was normal in  size with  preserved respiratory variability.     Pericardium  There is no pericardial effusion.  ______________________________________________________________________________  MMode/2D Measurements & Calculations  asc Aorta Diam: 3.4 cm  LVOT diam: 2.1 cm  LVOT area: 3.5 cm2  Asc Ao diam index BSA (cm/m2): 1.5  Asc Ao diam index Ht(cm/m): 2.0  LA Volume (BP): 91.7 ml     LA Volume Index (BP): 40.2 ml/m2     Doppler Measurements & Calculations  MV max P.4 mmHg  MV mean P.5 mmHg  MV V2 VTI: 41.0 cm  MVA(VTI): 2.1 cm2  Ao V2 max: 309.0 cm/sec  Ao max P.0 mmHg  Ao V2 mean: 218.0 cm/sec  Ao mean P.7 mmHg  Ao V2 VTI: 68.5 cm  MEL(I,D): 1.3 cm2  MEL(V,D): 1.2 cm2  LV V1 max P.7 mmHg  LV V1 max: 108.0 cm/sec  LV V1 VTI: 24.6 cm  SV(LVOT): 85.6 ml  SI(LVOT): 37.6 ml/m2  PA acc time: 0.10 sec  AV Julian Ratio (DI): 0.35  MEL Index (cm2/m2): 0.55     ______________________________________________________________________________  Report approved by: Bere Amado 2023 03:31 PM             Discharge Medications      Review of your medicines        START taking        Dose / Directions   cefuroxime 500 MG tablet  Commonly known as: CEFTIN  Used for: Acute cystitis with hematuria      Dose: 500 mg  Take 1 tablet (500 mg) by mouth 2 times daily for 7 days  Quantity: 14 tablet  Refills: 0     Torsemide 40 MG Tabs  Used for: Heart failure with preserved ejection fraction, NYHA class I (H)      Dose: 40 mg  Take 40 mg by mouth daily  Quantity: 30 tablet  Refills: 0            CONTINUE these medicines which may have CHANGED, or have new prescriptions. If we are uncertain of the size of tablets/capsules you have at home, strength may be listed as something that might have changed.        Dose / Directions   fluticasone-salmeterol 100-50 MCG/ACT inhaler  Commonly known as: Wixela Inhub  This may have changed:   how much to take  how to take this  when to take this  additional instructions  Used for: Mild  intermittent asthma without complication      USE 1 INHALATION BY MOUTH EVERY  12 HOURS  Quantity: 180 each  Refills: 3     metoprolol tartrate 25 MG tablet  Commonly known as: LOPRESSOR  This may have changed: how much to take  Used for: Atrial fibrillation with RVR (H)      Dose: 75 mg  Take 3 tablets (75 mg) by mouth 2 times daily  Refills: 0            CONTINUE these medicines which have NOT CHANGED        Dose / Directions   acetaminophen 500 MG tablet  Commonly known as: TYLENOL      Dose: 1,000 mg  Take 1,000 mg by mouth 2 times daily  Refills: 0     albuterol 108 (90 Base) MCG/ACT inhaler  Commonly known as: PROAIR HFA/PROVENTIL HFA/VENTOLIN HFA  Used for: SOB (shortness of breath)      Dose: 2 puff  Inhale 2 puffs into the lungs every 6 hours as needed for shortness of breath, wheezing or cough For shortness of breath  Refills: 0     apixaban ANTICOAGULANT 2.5 MG tablet  Commonly known as: ELIQUIS  Indication: Atrial Fibrillation Not Caused By A Heart Valve Problem  Used for: Atrial fibrillation with RVR (H)      Dose: 2.5 mg  Take 1 tablet (2.5 mg) by mouth 2 times daily  Quantity: 180 tablet  Refills: 1     atorvastatin 20 MG tablet  Commonly known as: LIPITOR  Used for: Hyperlipidemia LDL goal <70      TAKE 1 TABLET BY MOUTH DAILY FOR CHOLESTEROL  Quantity: 100 tablet  Refills: 2     buPROPion 100 MG 12 hr tablet  Commonly known as: WELLBUTRIN SR  Used for: Moderate major depression (H)      Dose: 100 mg  Take 1 tablet (100 mg) by mouth daily for mood  Quantity: 90 tablet  Refills: 3     calcium carbonate 500 MG chewable tablet  Commonly known as: TUMS      Dose: 1 chew tab  Take 1 chew tab by mouth 2 times daily as needed for heartburn  Refills: 0     camphor-menthol-methyl salicylate 3.1-6-10 % Ptch  Used for: Other chronic pain      Dose: 1 patch  Apply 1 patch topically as needed (hand pain) Or back pain  Refills: 0     DULoxetine 60 MG capsule  Commonly known as: CYMBALTA  Used for: Moderate major  depression (H)      Dose: 60 mg  Take 1 capsule (60 mg) by mouth daily  Quantity: 90 capsule  Refills: 3     fexofenadine 180 MG tablet  Commonly known as: ALLEGRA      Dose: 180 mg  Take 180 mg by mouth every evening  Refills: 0     fluticasone 50 MCG/ACT nasal spray  Commonly known as: FLONASE      Dose: 1 spray  Spray 1 spray into both nostrils daily as needed  Refills: 0     glimepiride 2 MG tablet  Commonly known as: AMARYL  Used for: Type 2 diabetes mellitus with diabetic nephropathy, without long-term current use of insulin (H)      Dose: 2 mg  Take 1 tablet (2 mg) by mouth 2 times daily (before meals)  Quantity: 200 tablet  Refills: 2     HAIR SKIN AND NAILS FORMULA PO      Dose: 1 tablet  Take 1 tablet by mouth daily  Refills: 0     Menthol (Topical Analgesic) 7.5 % (Roll) Misc      Apply to affected area every morning  Refills: 0     nitroGLYcerin 0.4 MG sublingual tablet  Commonly known as: NITROSTAT  Used for: Chest pain, unspecified type      FOR CHEST PAIN PLACE 1 TABLET  UNDER TONGUE EVERY 5 MINUTES FOR 3 DOSES. IF SYMPTOMS PERSIST 5  MINUTES AFTER 1ST DOSE CALL 911  Quantity: 25 tablet  Refills: 0     pantoprazole 20 MG EC tablet  Commonly known as: PROTONIX  Used for: Gastroesophageal reflux disease without esophagitis      Dose: 20 mg  Take 1 tablet (20 mg) by mouth daily  Quantity: 90 tablet  Refills: 3     Vitamin D3 25 mcg (1000 units) tablet  Commonly known as: CHOLECALCIFEROL      Dose: 25 mcg  Take 25 mcg by mouth daily  Refills: 0            STOP taking      clopidogrel 75 MG tablet  Commonly known as: PLAVIX        furosemide 20 MG tablet  Commonly known as: LASIX                  Where to get your medicines        These medications were sent to [facility] - [place] - [address] [address]  [address] [address] [facility] [address]      Phone: [phone]   Torsemide 40 MG Tabs       These medications were sent to [id number] - [place] -  [address] AT [facility] [address], [facility] [address]      Phone: [phone]   cefuroxime 500 MG tablet              Allergies   Allergies   Allergen Reactions    Aspirin Hives     Reaction occurred during childhood.     Lisinopril Cough    Losartan      Hyperkalemia      Metformin      Elevated lactic acid    Minocycline      Yellow Dye Allergy. Minocycline has Yellow Dye #10.    Mounjaro [Tirzepatide] Diarrhea and GI Disturbance    Salicylates Hives    Yellow Dye Hives     Rxn to yellow tablet. Eyes swelled shut.     Yellow Dyes (Non-Tartrazine) Hives

## 2023-12-31 NOTE — PLAN OF CARE
A&OX4,2 L NC, hypertensive overnight, Tele- Afib CVR, up with SBA with walker. +2 RLE edema, 2 gm Na diet, 1500 ml fluid restrictions.     Urine noted to be odorous, mucous  in color

## 2024-01-01 LAB — BACTERIA UR CULT: ABNORMAL

## 2024-01-02 ENCOUNTER — PATIENT OUTREACH (OUTPATIENT)
Dept: NURSING | Facility: CLINIC | Age: 89
End: 2024-01-02
Payer: COMMERCIAL

## 2024-01-02 ASSESSMENT — ACTIVITIES OF DAILY LIVING (ADL): DEPENDENT_IADLS:: INDEPENDENT;CLEANING

## 2024-01-02 NOTE — LETTER
[facility] CARE COORDINATION  [address]  [facility] [place] [address]    January 2, 2024    Moira Andre  [address]  [facility] [address]      Dear Moira,    I am a clinic care coordinator who works with Maryan Churchill MD with the [facility]. I wanted to thank you for spending the time to talk with me.  Below is a description of clinic care coordination and how I can further assist you.       The clinic care coordination team is made up of a registered nurse, , financial resource worker and community health worker who understand the health care system. The goal of clinic care coordination is to help you manage your health and improve access to the health care system. Our team works alongside your provider to assist you in determining your health and social needs. We can help you obtain health care and community resources, providing you with necessary information and education. We can work with you through any barriers and develop a care plan that helps coordinate and strengthen the communication between you and your care team.  Our services are voluntary and are offered without charge to you personally.    Please feel free to contact me with any questions or concerns regarding care coordination and what we can offer.      We are focused on providing you with the highest-quality healthcare experience possible.    Sincerely,      Danika Paredes RN, BSN, PHN  Primary Care / Care Coordinator   [facility]  E-mail [email or web address]   [phone]

## 2024-01-02 NOTE — PROGRESS NOTES
"Clinic Care Coordination Contact  Clinic Care Coordination Contact  OUTREACH with Post Discharge Assessment    Referral Information:  Referral Source: IP Handoff    Primary Diagnosis: Acute MI (heart attack) (A. Fib with RVR , CHF, CAD, NSTEMI type II, asthma, depression/anxiety)    Chief Complaint   Patient presents with    Clinic Care Coordination - Initial    Clinic Care Coordination - Post Hospital      [place] Utilization:   [facility]  Clinic Utilization:  [facility]   Difficulty keeping appointments:: No  Compliance Concerns: No  No-Show Concerns: No  No PCP office visit in Past Year: No  Utilization      No Show Count (past year)  5             ED Visits  4             Hospital Admissions  3                    Current as of: 1/2/2024  8:05 AM              Clinical Concerns:  Current Medical Concerns:  Recent discharge from hospital    Current Behavioral Concerns: Depression/Anxiety    Education Provided to patient:   RNCC called and spoke with patient/caregiver; introduced self, discussed role of Care Coordination and explained reason for call   Pain  Pain (GOAL):: No  Health Maintenance Reviewed: Due/Overdue   Health Maintenance Due   Topic Date Due    ASTHMA ACTION PLAN  03/03/2023    URINE DRUG SCREEN  06/27/2023    DIABETIC FOOT EXAM  10/24/2023    ZOSTER IMMUNIZATION (2 of 2) 08/11/2023     Clinical Pathway: None    Admission:    Admission Date: 12/27/23   Reason for Admission: A. Fib with RVR , CHF, CAD, NSTEMI type II, asthma, depression/anxiety  Discharge:   Discharge Date: 12/31/23  Discharge Diagnosis: A. Fib with RVR , CHF, CAD, NSTEMI type II, asthma, depression/anxiety    Discharge Assessment  How are you doing now that you are home?: \"I'm ok, I fell out of bed yesterday but didn't hit my head or break anything\"  How are your symptoms? (Red Flag symptoms escalate to triage hotline per guidelines): Improved  Do you feel your condition is stable " enough to be safe at home until your provider visit?: Yes  Does the patient have their discharge instructions? : Yes  Does the patient have questions regarding their discharge instructions? : No  Were you started on any new medications or were there changes to any of your previous medications? : Yes  Does the patient have all of their medications?: Yes  Do you have questions regarding any of your medications? : No  Do you have all of your needed medical supplies or equipment (DME)?  (i.e. oxygen tank, CPAP, cane, etc.): Yes  Discharge follow-up appointment scheduled within 14 calendar days? : Yes  Discharge Follow Up Appointment Date: 01/11/24  Discharge Follow Up Appointment Scheduled with?: Primary Care Provider    Post-op (Clinicians Only)  Did the patient have surgery or a procedure: No  Fever: No  Chills: No  Eating & Drinking: eating and drinking without complaints/concerns  PO Intake: regular diet  Additional Symptoms:  (Patient denies)  Bowel Function: normal  Date of last BM: 01/02/24  Urinary Status: voiding without complaint/concerns    Medication Management:  Medication review status: Medications reviewed and no changes reported per patient.        Current Outpatient Medications   Medication    acetaminophen (TYLENOL) 500 MG tablet    albuterol (PROAIR HFA/PROVENTIL HFA/VENTOLIN HFA) 108 (90 Base) MCG/ACT inhaler    apixaban ANTICOAGULANT (ELIQUIS) 2.5 MG tablet    atorvastatin (LIPITOR) 20 MG tablet    buPROPion (WELLBUTRIN SR) 100 MG 12 hr tablet    calcium carbonate (TUMS) 500 MG chewable tablet    camphor-menthol-methyl salicylate 3.1-6-10 % PTCH    cefuroxime (CEFTIN) 500 MG tablet    DULoxetine (CYMBALTA) 60 MG capsule    fexofenadine (ALLEGRA) 180 MG tablet    fluticasone (FLONASE) 50 MCG/ACT nasal spray    fluticasone-salmeterol (WIXELA INHUB) 100-50 MCG/ACT inhaler    glimepiride (AMARYL) 2 MG tablet    Menthol (Topical Analgesic) 7.5 % (Roll) MISC    metoprolol tartrate (LOPRESSOR) 25 MG tablet     Multiple Vitamins-Minerals (HAIR SKIN AND NAILS FORMULA PO)    nitroGLYcerin (NITROSTAT) 0.4 MG sublingual tablet    pantoprazole (PROTONIX) 20 MG EC tablet    torsemide 40 MG TABS    Vitamin D3 (CHOLECALCIFEROL) 25 mcg (1000 units) tablet     No current facility-administered medications for this visit.      Functional Status:  Dependent ADLs:: Ambulation-walker  Dependent IADLs:: Independent, Cleaning  Bed or wheelchair confined:: No  Mobility Status: Independent w/Device  Any fall with injury in the past year?: Yes (Patient fell out of bed 1/1/2024; denies any injuries and patient states she did not hit her head)    Living Situation:  Current living arrangement:: I live alone  Type of residence:: Apartment - handicap accessible    Lifestyle & Psychosocial Needs:    Social Determinants of Health     Food Insecurity: Low Risk  (10/26/2023)    Food Insecurity     Within the past 12 months, did you worry that your food would run out before you got money to buy more?: No     Within the past 12 months, did the food you bought just not last and you didn t have money to get more?: No   Depression: Not at risk (11/16/2023)    PHQ-2     PHQ-2 Score: 2   Housing Stability: Low Risk  (10/26/2023)    Housing Stability     Do you have housing? : Yes     Are you worried about losing your housing?: No   Tobacco Use: Medium Risk (12/27/2023)    Patient History     Smoking Tobacco Use: Former     Smokeless Tobacco Use: Never     Passive Exposure: Not on file   Financial Resource Strain: Low Risk  (10/26/2023)    Financial Resource Strain     Within the past 12 months, have you or your family members you live with been unable to get utilities (heat, electricity) when it was really needed?: No   Alcohol Use: Not on file   Transportation Needs: Low Risk  (10/26/2023)    Transportation Needs     Within the past 12 months, has lack of transportation kept you from medical appointments, getting your medicines, non-medical meetings or  appointments, work, or from getting things that you need?: No   Physical Activity: Not on file   Interpersonal Safety: Low Risk  (10/26/2023)    Interpersonal Safety     Do you feel physically and emotionally safe where you currently live?: Yes     Within the past 12 months, have you been hit, slapped, kicked or otherwise physically hurt by someone?: No     Within the past 12 months, have you been humiliated or emotionally abused in other ways by your partner or ex-partner?: No   Stress: No Stress Concern Present (6/10/2021)    [employer] - Occupational Stress Questionnaire     Feeling of Stress : Not at all   Social Connections: Unknown (6/10/2021)    Social Connection and Isolation Panel [NHANES]     Frequency of Communication with Friends and Family: Not on file     Frequency of Social Gatherings with Friends and Family: Not on file     Attends Orthodoxy Services: Not on file     Active Member of Clubs or Organizations: Not on file     Attends Club or Organization Meetings: Not on file     Marital Status:      Diet:: Regular  Inadequate nutrition (GOAL):: No  Tube Feeding: No  Inadequate activity/exercise (GOAL):: No  Significant changes in sleep pattern (GOAL): No  Transportation means:: Regular car     Orthodoxy or spiritual beliefs that impact treatment:: No  Mental health DX:: Yes  Mental health DX how managed:: Medication  Mental health management concern (GOAL):: No  Chemical Dependency Status: No Current Concerns  Chemical Dependency Management:  (NA)  Informal Support system:: Neighbors      Resources and Interventions:  Current Resources:   Skilled Home Care Services: Skilled Nursing, Physical Therapy, Occupational Therapy  Community Resources: Home Care  Supplies Currently Used at Home: Hearing Aid Batteries  Equipment Currently Used at Home: walker, rolling  Employment Status: retired    Advance Care Plan/Directive  Advanced Care Plans/Directives on file::  No    Referrals Placed: None     Care Plan: NA     Future Appointments                In 1 week Hai Herrera MD [facility] [place], [facility] PHY [facility]    In 1 week Maryan Churchill MD [facility], CS    In 1 month Gricel Sloan APRN CNP [facility], UMP PSA CLIN    In 1 month Maryan Churchill MD [facility], CS    In 2 months Jozef Sinha MD [facility], [facility] PSA CLIN          Plan:   -Patient will contact the care team with questions, concerns, support needs   -Patient will use the clinic as a resource and understands (s)he can contact the [facility] with 24/7 after hours services available  -Care Coordinator will remain available as needed  -No further Care Coordination outreaches at this time     Danika Paredes RN, BSN, PHN  Primary Care / Care Coordinator   [facility]  [email or web address] [email or web address]   [phone]

## 2024-01-03 ENCOUNTER — TELEPHONE (OUTPATIENT)
Dept: CARDIOLOGY | Facility: CLINIC | Age: 89
End: 2024-01-03
Payer: COMMERCIAL

## 2024-01-03 NOTE — TELEPHONE ENCOUNTER
Patient was admitted to [facility] on 12/27/23 with complaint of worsening shortness of breath for the last couple of weeks and HF exacerbation. Chronic A. Fib with RVR.    PMH: asthma, mild chronic kidney disease stage III, heart failure with preserved ejection fraction, diabetes mellitus type 2, hypertension, coronary artery disease, rheumatoid arthritis, obstructive sleep apnea not currently using CPAP, TIA, hyperlipidemia, GERD, atrial fibrillation on chronic anticoagulation with Eliquis.     12/28/23: Echo showed EF of 55-60%. MV MG 5-7 mmHg. No change from prior.     IV Lasix diuresed.    Pt was started on Demadex and ABX. PTA Metoprolol dosage was increased. Plavix and Lasix were discontinued at time of discharge.    Called patient to discuss any post hospital d/c questions she may have, review medication changes, and confirm f/u appts. Patient denied any questions regarding new medications or changes to PTA medications.     Patient denied any SOB, chest pain, palpitations or light headedness. States she fell out of bed two days ago. Had to call EMS to help get her up. States she did not hit her head and no bruising. Takes Eliquis. Reminded if she ever falls and strikes her head, she needs to go to ED. States she is stiff from fall. Pt lives alone.    RN confirmed with patient that she is scheduled for an OV on 2/8/24 at 0950 with ABEL Gricel Sloan at our [place] Office. Dr. Sinha Team RN phone number provided.    Patient advised to call clinic with any cardiac related questions or concerns prior to this abel't. Patient verbalized understanding and agreed with plan. LYNNE Carlson RN.

## 2024-01-04 NOTE — PLAN OF CARE
January 4, 2024       No Recipients    Patient: Melecoi Magallon   YOB: 1961   Date of Visit: 1/4/2024       Dear Johny Luis MD,    Melecio Magallon was in my office today. Below are the relevant portions of my assessment and plan of care.    Mr. Magallon is a 62-year-old male who had an ascending aortic aneurysm, aortic root aneurysm and bicuspid aortic valve. He is now 6 weeks status post bio ental with hemiarch replacement. He did very well after surgery. I'm very happy with his progress. He has healed well without any problems with healing. He did have some paroxysmal atrial fibrillation after surgery. He seems to be in sinus rhythm today as his rhythm feels regular on exam. He has not had follow-up with cardiology yet, but plans to make an appointment with Dr. Monteiro's office. I would recommend continuation of anticoagulation until confirmation of sinus rhythm for at least a 1 week Holter.     He has healed excellent. His sternum is stable. We discussed returning to normal activity on a progressive basis. He understands and agrees. Okay to return to driving. He plans to have inguinal hernia surgery soon, which I think is okay to proceed with and hold anticoagulation if needed for it. But would prefer to continue his aspirin. I have reviewed his imaging and his aortic repair appears excellent as well as no problems with his new prosthetic aortic valve.     Thank you for trusting me in the care of Mr. Magallon. I will plan on seeing him back in 1 year with a repeat echocardiogram. Please do not hesitate to call with questions or concerns.         Sincerely,        Ramon Kumar MD        CC:   No Recipients   Problem: Patient Care Overview  Goal: Plan of Care/Patient Progress Review  Outcome: Adequate for Discharge Date Met: 07/06/18  Discharge    Patient discharged to home via wheelchair to door 6 with daughter in law, Krystina, providing transportation.    Listed belongings gathered and returned to patient. Yes  Care Plan and Patient education resolved: Yes  Prescriptions if needed, hard copies sent with patient  NA  Home and hospital acquired medications returned to patient: Yes  Medication Bin checked and emptied on discharge Yes  Follow up appointment made for patient: Yes    A/Ox4, denies pain, nausea. Tolerating mod carb diet well.   this AM, patient had already eaten.  Up SBA to shower this morning. Patient has been up in chair for meals and walked around unit.  Performing IS independently.   Discharge instructions and AVS reviewed.  No prescriptions to fill.  Follow up appointments reviewed.  Patient verbalizes understanding.  Belongings returned.  Advair re-labeled for home use.

## 2024-01-10 ENCOUNTER — OFFICE VISIT (OUTPATIENT)
Dept: UROLOGY | Facility: CLINIC | Age: 89
End: 2024-01-10
Payer: COMMERCIAL

## 2024-01-10 VITALS
WEIGHT: 256 LBS | SYSTOLIC BLOOD PRESSURE: 132 MMHG | HEART RATE: 71 BPM | DIASTOLIC BLOOD PRESSURE: 78 MMHG | OXYGEN SATURATION: 93 % | BODY MASS INDEX: 40.18 KG/M2 | HEIGHT: 67 IN

## 2024-01-10 DIAGNOSIS — G47.33 OSA ON CPAP: ICD-10-CM

## 2024-01-10 DIAGNOSIS — N20.0 RIGHT KIDNEY STONE: Primary | ICD-10-CM

## 2024-01-10 DIAGNOSIS — E66.01 MORBID OBESITY (H): ICD-10-CM

## 2024-01-10 DIAGNOSIS — E11.21 TYPE 2 DIABETES MELLITUS WITH DIABETIC NEPHROPATHY, WITHOUT LONG-TERM CURRENT USE OF INSULIN (H): ICD-10-CM

## 2024-01-10 PROCEDURE — 99214 OFFICE O/P EST MOD 30 MIN: CPT | Performed by: UROLOGY

## 2024-01-10 ASSESSMENT — PAIN SCALES - GENERAL: PAINLEVEL: NO PAIN (0)

## 2024-01-10 NOTE — PROGRESS NOTES
Urology Consult History and Physical  [facility]  Name: Moira nAdre    MRN: [id number]   YOB: 1933       We were asked to see Moira Andre at the request of Dr. Churchill for evaluation and treatment of RIGHT kidney stone.        Chief Complaint:   RIGHT kidney stone    History is obtained from the patient and medical record            History of Present Illness:   Hospital consult 11/26/2023:  Moira Andre is a 89 year old female with HFpEF, Afib, CKD, DM2, MDD, ELIGIO who is admitted with several days of right sided back spasms and A Fib, right renal pelvis stone that may be intermittently obstructing for which urology is consulted.     She reports migratory right flank and abdominal pain. This pain is similar to her spasms, but continues to cause her significant discomfort. She has no blood in the urine, no pain or burning with urination, no signs/symptoms of kidney infection. She has never had kidney stones previously.     She is afebrile, intermittently tachycardic and intermittently hypotensive. Cr this AM uptrended to 2.0 from 1.7, normal WBC. UA is nitrite negative, leuk esterase positive. CT scan with renal pelvis stone without hydronephrosis    TODAY 1/10/2024:  Hospital follow-up today  She is doing well after discharge with no ongoing abdominal or flank pain  She notes that she feels her back pain was more related to muscle skeletal in nature  She is very much hoping that no treatment is needed for her kidney stone           Past Medical History:     Past Medical History:   Diagnosis Date    Aortic valve sclerosis     heart murmur, no AS    Arrhythmia     PAT, PVC    Aspirin allergy     Plavix use long term    Asthma     CKD (chronic kidney disease) stage 3, GFR 30-59 ml/min (H)     x 2007 atleast    Congestive heart failure, unspecified     Depression     Diabetes mellitus (H) 2010    Diastolic dysfunction, left ventricle 2013    grade 2, nl ef    HTN (hypertension)     Lactic acidosis  2018    due to dehydration and metformin    Migraine headache     Mitral stenosis     mild, 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    Myocardial infarction (H) 2007, cath 2013 ml    BMS: stent to OM, diag, nl EF, echo /C angia 2013 , f/u cath no lesion >40%    Nephrolithiasis     right side    OA (osteoarthritis) of knee     Obesity     Rheumatoid arthritis flare (H)     prednisone    Sleep apnea     restarted using cpap     TIA (transient ischaemic attack)     Ventral hernia, unspecified, without mention of obstruction or gangrene             Past Surgical History:     Past Surgical History:   Procedure Laterality Date    APPENDECTOMY      BIOPSY BREAST      x2 -needle & lumpectomy-benign    CHOLECYSTECTOMY      CORONARY ANGIOGRAPHY ADULT ORDER  2007    Bare metal stent to OM1, Diagonal patent     CORONARY ANGIOGRAPHY ADULT ORDER  2007    [place] stent to Diagonal    HC LEFT HEART CATHETERIZATION  2013    Moderate CAD    HYSTERECTOMY TOTAL ABDOMINAL      ORTHOPEDIC SURGERY      knee replacement on right side (), Left side ()    RELEASE CARPAL TUNNEL      right and left    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            Social History:     Social History     Tobacco Use    Smoking status: Former     Packs/day: 0.25     Years: 1.00     Additional pack years: 0.00     Total pack years: 0.25     Types: Cigarettes     Start date:      Quit date: 1973     Years since quittin.7    Smokeless tobacco: Never   Substance Use Topics    Alcohol use: Yes     Alcohol/week: 0.0 - 1.0 standard drinks of alcohol     Comment: rarely       History   Smoking Status    Former    Packs/day: 0.25    Years: 1.00    Types: Cigarettes    Start date:     Quit date: 1973   Smokeless Tobacco    Never            Family History:     Family History   Problem Relation Age of Onset    Neurologic Disorder Mother         MS - at 60's    C.A.D. Father          at 8o's, ? prostate ca    Breast  Cancer No family hx of     Cancer - colorectal No family hx of               Allergies:     Allergies   Allergen Reactions    Aspirin Hives     Reaction occurred during childhood.     Lidocaine Itching    Lisinopril Cough    Losartan      Hyperkalemia      Metformin      Elevated lactic acid    Minocycline      Yellow Dye Allergy. Minocycline has Yellow Dye #10.    Mounjaro [Tirzepatide] Diarrhea and GI Disturbance    Salicylates Hives    Yellow Dye Hives     Rxn to yellow tablet. Eyes swelled shut.     Yellow Dyes (Non-Tartrazine) Hives            Medications:     Current Outpatient Medications   Medication Sig    acetaminophen (TYLENOL) 500 MG tablet Take 1,000 mg by mouth 2 times daily    albuterol (PROAIR HFA/PROVENTIL HFA/VENTOLIN HFA) 108 (90 Base) MCG/ACT inhaler Inhale 2 puffs into the lungs every 6 hours as needed for shortness of breath, wheezing or cough For shortness of breath    apixaban ANTICOAGULANT (ELIQUIS) 2.5 MG tablet Take 1 tablet (2.5 mg) by mouth 2 times daily    atorvastatin (LIPITOR) 20 MG tablet TAKE 1 TABLET BY MOUTH DAILY FOR CHOLESTEROL    buPROPion (WELLBUTRIN SR) 100 MG 12 hr tablet Take 1 tablet (100 mg) by mouth daily for mood    calcium carbonate (TUMS) 500 MG chewable tablet Take 1 chew tab by mouth 2 times daily as needed for heartburn    camphor-menthol-methyl salicylate 3.1-6-10 % PTCH Apply 1 patch topically as needed (hand pain) Or back pain    DULoxetine (CYMBALTA) 60 MG capsule Take 1 capsule (60 mg) by mouth daily    fexofenadine (ALLEGRA) 180 MG tablet Take 180 mg by mouth every evening    fluticasone (FLONASE) 50 MCG/ACT nasal spray Spray 1 spray into both nostrils daily as needed     fluticasone-salmeterol (WIXELA INHUB) 100-50 MCG/ACT inhaler USE 1 INHALATION BY MOUTH EVERY  12 HOURS (Patient taking differently: Inhale 1 puff into the lungs daily)    glimepiride (AMARYL) 2 MG tablet Take 1 tablet (2 mg) by mouth 2 times daily (before meals)    Menthol (Topical  "Analgesic) 7.5 % (Roll) MISC Apply to affected area every morning    metoprolol tartrate (LOPRESSOR) 25 MG tablet Take 3 tablets (75 mg) by mouth 2 times daily    Multiple Vitamins-Minerals (HAIR SKIN AND NAILS FORMULA PO) Take 1 tablet by mouth daily    nitroGLYcerin (NITROSTAT) 0.4 MG sublingual tablet FOR CHEST PAIN PLACE 1 TABLET  UNDER TONGUE EVERY 5 MINUTES FOR 3 DOSES. IF SYMPTOMS PERSIST 5  MINUTES AFTER 1ST DOSE CALL 911    pantoprazole (PROTONIX) 20 MG EC tablet Take 1 tablet (20 mg) by mouth daily    torsemide 40 MG TABS Take 40 mg by mouth daily    Vitamin D3 (CHOLECALCIFEROL) 25 mcg (1000 units) tablet Take 25 mcg by mouth daily     No current facility-administered medications for this visit.             Review of Systems:     Reviewed and negative except for as noted above          Physical Exam:   Patient Vitals for the past 24 hrs:   BP Pulse SpO2 Height Weight   01/10/24 1311 132/78 71 93 % 1.702 m (5' 7\") 116.1 kg (256 lb)     Body mass index is 40.1 kg/m .     General: age-appropriate appearing female in NAD  HEENT: Head AT/NC, EOMI, CN Grossly intact  Lungs: no respiratory distress, or pursed lip breathing  Heart: No obvious jugular venous distension present  Neuro: Alert, oriented, speech and mentation normal  Psych: affect and mood normal          Data:   All laboratory data reviewed:    UA RESULTS:  Recent Labs   Lab Test 12/31/23  1120 11/25/23  1253 08/23/23  1007   COLOR Brown*   < > Yellow   APPEARANCE Cloudy*   < > Slightly Cloudy*   URINEGLC Negative   < > Negative   URINEBILI Negative   < > Negative   URINEKETONE Negative   < > Negative   SG 1.012   < > 1.015   UBLD Large*   < > Small*   URINEPH 6.0   < > 5.5   PROTEIN 100*   < > 100*   UROBILINOGEN  --   --  0.2   NITRITE Positive*   < > Positive*   LEUKEST Large*   < > Moderate*   RBCU >182*   < > 2-5*   WBCU >182*   < > 25-50*    < > = values in this interval not displayed.      UCx:  12/31/2023 >100k E Coli    Lab Results "   Component Value Date    CR 1.83 12/31/2023    CR 1.50 02/18/2021      IMAGING:  All pertinent imaging reviewed:    All imaging studies reviewed by me.  I personally reviewed these imaging films.  A formal report from radiology will follow.    CT ABD/PEL 11/26/2023:  FINDINGS:   LOWER CHEST: Mild scattered atelectasis. No consolidation or pleural effusion.     HEPATOBILIARY: Normal. Gallbladder is surgically absent.     PANCREAS: Normal.     SPLEEN: Normal.     ADRENAL GLANDS: Normal.     KIDNEYS/BLADDER: Multiple bilateral renal cysts measuring up to 4.8 cm in the right kidney appears benign and do not require follow-up. A 1 x 0.7 cm stone is seen in the right renal pelvis with marked adjacent fat stranding. No right hydronephrosis. A 1   mm nonobstructing stones also seen in the right kidney. No left hydroureteronephrosis. Bladder is decompressed and grossly unremarkable.     BOWEL: Colonic diverticulosis without acute diverticulitis. No focal bowel thickening or obstruction.     LYMPH NODES: Normal.     VASCULATURE: Mild aortoiliac atherosclerotic calcifications. No abdominal aortic aneurysm.     PELVIC ORGANS: Status post hysterectomy. No abnormal adnexal masses.     MUSCULOSKELETAL: Multilevel mild and moderate degenerative disc space narrowing with vacuum disc in the lower thoracic and lumbar spine. No suspicious osseous lesions or acute fractures. A large ventral abdominal hernia is present containing   nonobstructed colon and small bowel.                                                                   IMPRESSION:      1.  1 cm stone in the right renal pelvis with marked adjacent fat stranding indicating superimposed infection. Recommend correlation with urinalysis. No hydronephrosis.     2.  Additional 1 mm nonobstructing stone in the right kidney.     3.  Large ventral abdominal hernia containing nonobstructed colon and small bowel.     4.  Colonic diverticulosis without acute diverticulitis.          Impression and Plan:   Impression:   89-year-old female with complex medical history including heart failure, atrial fibrillation diabetes mellitus recent hospital admission where she was noted to have some flank and back pain and found to have a 1 cm right kidney stone      Plan:   1 cm right kidney stone  - I again reviewed her labs with a stable serum creatinine  - I reviewed her urine culture and she did ultimately have a UTI on 12/31/2023 which was appropriately treated and did not involve pyelonephritis  - I reviewed her CT scan and reviewed these images personally.  I agree with radiologist interpretation.  We discussed that she does have a stone in her renal pelvis which does not appear to be causing any obstruction or impacting her kidney function  - Given that she is asymptomatic from the stone with multiple medical comorbidities, I would recommend treatment of the stone only if more urgently indicated  - She may follow-up with urology on a as needed basis  - She is quite happy with this plan     Thank you for the kind consultation.    Time spent: 20 minutes spent on the date of the encounter doing chart review, history and exam, documentation and further activities as noted above.    Hai Herrera MD   Urology  [facility]  [facility] Phone: [phone]  [facility] Phone: [phone]

## 2024-01-10 NOTE — LETTER
1/10/2024       RE: Moira Andre  [address]  [place] [address]     Dear Colleague,    Thank you for referring your patient, Moira Andre, to the [facility]. Please see a copy of my visit note below.    Urology Consult History and Physical  [place]  Name: Moira Andre    MRN: [id number]   YOB: 1933       We were asked to see Moira Andre at the request of Dr. Churchill for evaluation and treatment of RIGHT kidney stone.        Chief Complaint:   RIGHT kidney stone    History is obtained from the patient and medical record            History of Present Illness:   Hospital consult 11/26/2023:  Moira Andre is a 89 year old female with HFpEF, Afib, CKD, DM2, MDD, ELIGIO who is admitted with several days of right sided back spasms and A Fib, right renal pelvis stone that may be intermittently obstructing for which urology is consulted.     She reports migratory right flank and abdominal pain. This pain is similar to her spasms, but continues to cause her significant discomfort. She has no blood in the urine, no pain or burning with urination, no signs/symptoms of kidney infection. She has never had kidney stones previously.     She is afebrile, intermittently tachycardic and intermittently hypotensive. Cr this AM uptrended to 2.0 from 1.7, normal WBC. UA is nitrite negative, leuk esterase positive. CT scan with renal pelvis stone without hydronephrosis    TODAY 1/10/2024:  Hospital follow-up today  She is doing well after discharge with no ongoing abdominal or flank pain  She notes that she feels her back pain was more related to muscle skeletal in nature  She is very much hoping that no treatment is needed for her kidney stone           Past Medical History:     Past Medical History:   Diagnosis Date    Aortic valve sclerosis     heart murmur, no AS    Arrhythmia     PAT, PVC    Aspirin  allergy     Plavix use long term    Asthma     CKD (chronic kidney disease) stage 3, GFR 30-59 ml/min (H)     x 2007 atleast    Congestive heart failure, unspecified     Depression     Diabetes mellitus (H)     Diastolic dysfunction, left ventricle     grade 2, nl ef    HTN (hypertension)     Lactic acidosis 2018    due to dehydration and metformin    Migraine headache     Mitral stenosis     mild, 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    Myocardial infarction (H) 2007, cath 2013 ml    BMS: stent to OM, diag, nl EF, echo /C angia  , f/u cath no lesion >40%    Nephrolithiasis     right side    OA (osteoarthritis) of knee     Obesity     Rheumatoid arthritis flare (H)     prednisone    Sleep apnea     restarted using cpap     TIA (transient ischaemic attack)     Ventral hernia, unspecified, without mention of obstruction or gangrene             Past Surgical History:     Past Surgical History:   Procedure Laterality Date    APPENDECTOMY      BIOPSY BREAST      x2 -needle & lumpectomy-benign    CHOLECYSTECTOMY      CORONARY ANGIOGRAPHY ADULT ORDER  2007    Bare metal stent to OM1, Diagonal patent     CORONARY ANGIOGRAPHY ADULT ORDER  2007    [place] stent to Diagonal    HC LEFT HEART CATHETERIZATION  2013    Moderate CAD    HYSTERECTOMY TOTAL ABDOMINAL      ORTHOPEDIC SURGERY      knee replacement on right side (), Left side ()    RELEASE CARPAL TUNNEL      right and left    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            Social History:     Social History     Tobacco Use    Smoking status: Former     Packs/day: 0.25     Years: 1.00     Additional pack years: 0.00     Total pack years: 0.25     Types: Cigarettes     Start date:      Quit date: 1973     Years since quittin.7    Smokeless tobacco: Never   Substance Use Topics    Alcohol use: Yes     Alcohol/week: 0.0 - 1.0 standard drinks of alcohol     Comment: rarely       History   Smoking Status    Former     Packs/day: 0.25    Years: 1.00    Types: Cigarettes    Start date:     Quit date: 1973   Smokeless Tobacco    Never            Family History:     Family History   Problem Relation Age of Onset    Neurologic Disorder Mother         MS - at 60's    C.A.D. Father          at 8o's, ? prostate ca    Breast Cancer No family hx of     Cancer - colorectal No family hx of           Allergies:     Allergies   Allergen Reactions    Aspirin Hives     Reaction occurred during childhood.     Lidocaine Itching    Lisinopril Cough    Losartan      Hyperkalemia      Metformin      Elevated lactic acid    Minocycline      Yellow Dye Allergy. Minocycline has Yellow Dye #10.    Mounjaro [Tirzepatide] Diarrhea and GI Disturbance    Salicylates Hives    Yellow Dye Hives     Rxn to yellow tablet. Eyes swelled shut.     Yellow Dyes (Non-Tartrazine) Hives            Medications:     Current Outpatient Medications   Medication Sig    acetaminophen (TYLENOL) 500 MG tablet Take 1,000 mg by mouth 2 times daily    albuterol (PROAIR HFA/PROVENTIL HFA/VENTOLIN HFA) 108 (90 Base) MCG/ACT inhaler Inhale 2 puffs into the lungs every 6 hours as needed for shortness of breath, wheezing or cough For shortness of breath    apixaban ANTICOAGULANT (ELIQUIS) 2.5 MG tablet Take 1 tablet (2.5 mg) by mouth 2 times daily    atorvastatin (LIPITOR) 20 MG tablet TAKE 1 TABLET BY MOUTH DAILY FOR CHOLESTEROL    buPROPion (WELLBUTRIN SR) 100 MG 12 hr tablet Take 1 tablet (100 mg) by mouth daily for mood    calcium carbonate (TUMS) 500 MG chewable tablet Take 1 chew tab by mouth 2 times daily as needed for heartburn    camphor-menthol-methyl salicylate 3.1-6-10 % PTCH Apply 1 patch topically as needed (hand pain) Or back pain    DULoxetine (CYMBALTA) 60 MG capsule Take 1 capsule (60 mg) by mouth daily    fexofenadine (ALLEGRA) 180 MG tablet Take 180 mg by mouth every evening    fluticasone (FLONASE) 50 MCG/ACT nasal spray Spray 1 spray into both  "nostrils daily as needed     fluticasone-salmeterol (WIXELA INHUB) 100-50 MCG/ACT inhaler USE 1 INHALATION BY MOUTH EVERY  12 HOURS (Patient taking differently: Inhale 1 puff into the lungs daily)    glimepiride (AMARYL) 2 MG tablet Take 1 tablet (2 mg) by mouth 2 times daily (before meals)    Menthol (Topical Analgesic) 7.5 % (Roll) MISC Apply to affected area every morning    metoprolol tartrate (LOPRESSOR) 25 MG tablet Take 3 tablets (75 mg) by mouth 2 times daily    Multiple Vitamins-Minerals (HAIR SKIN AND NAILS FORMULA PO) Take 1 tablet by mouth daily    nitroGLYcerin (NITROSTAT) 0.4 MG sublingual tablet FOR CHEST PAIN PLACE 1 TABLET  UNDER TONGUE EVERY 5 MINUTES FOR 3 DOSES. IF SYMPTOMS PERSIST 5  MINUTES AFTER 1ST DOSE CALL 911    pantoprazole (PROTONIX) 20 MG EC tablet Take 1 tablet (20 mg) by mouth daily    torsemide 40 MG TABS Take 40 mg by mouth daily    Vitamin D3 (CHOLECALCIFEROL) 25 mcg (1000 units) tablet Take 25 mcg by mouth daily     No current facility-administered medications for this visit.           Review of Systems:     Reviewed and negative except for as noted above          Physical Exam:   Patient Vitals for the past 24 hrs:   BP Pulse SpO2 Height Weight   01/10/24 1311 132/78 71 93 % 1.702 m (5' 7\") 116.1 kg (256 lb)     Body mass index is 40.1 kg/m .     General: age-appropriate appearing female in NAD  HEENT: Head AT/NC, EOMI, CN Grossly intact  Lungs: no respiratory distress, or pursed lip breathing  Heart: No obvious jugular venous distension present  Neuro: Alert, oriented, speech and mentation normal  Psych: affect and mood normal          Data:   All laboratory data reviewed:    UA RESULTS:  Recent Labs   Lab Test 12/31/23  1120 11/25/23  1253 08/23/23  1007   COLOR Brown*   < > Yellow   APPEARANCE Cloudy*   < > Slightly Cloudy*   URINEGLC Negative   < > Negative   URINEBILI Negative   < > Negative   URINEKETONE Negative   < > Negative   SG 1.012   < > 1.015   UBLD Large*   < > " Small*   URINEPH 6.0   < > 5.5   PROTEIN 100*   < > 100*   UROBILINOGEN  --   --  0.2   NITRITE Positive*   < > Positive*   LEUKEST Large*   < > Moderate*   RBCU >182*   < > 2-5*   WBCU >182*   < > 25-50*    < > = values in this interval not displayed.      UCx:  12/31/2023 >100k E Coli    Lab Results   Component Value Date    CR 1.83 12/31/2023    CR 1.50 02/18/2021      IMAGING:  All pertinent imaging reviewed:    All imaging studies reviewed by me.  I personally reviewed these imaging films.  A formal report from radiology will follow.    CT ABD/PEL 11/26/2023:  FINDINGS:   LOWER CHEST: Mild scattered atelectasis. No consolidation or pleural effusion.     HEPATOBILIARY: Normal. Gallbladder is surgically absent.     PANCREAS: Normal.     SPLEEN: Normal.     ADRENAL GLANDS: Normal.     KIDNEYS/BLADDER: Multiple bilateral renal cysts measuring up to 4.8 cm in the right kidney appears benign and do not require follow-up. A 1 x 0.7 cm stone is seen in the right renal pelvis with marked adjacent fat stranding. No right hydronephrosis. A 1   mm nonobstructing stones also seen in the right kidney. No left hydroureteronephrosis. Bladder is decompressed and grossly unremarkable.     BOWEL: Colonic diverticulosis without acute diverticulitis. No focal bowel thickening or obstruction.     LYMPH NODES: Normal.     VASCULATURE: Mild aortoiliac atherosclerotic calcifications. No abdominal aortic aneurysm.     PELVIC ORGANS: Status post hysterectomy. No abnormal adnexal masses.     MUSCULOSKELETAL: Multilevel mild and moderate degenerative disc space narrowing with vacuum disc in the lower thoracic and lumbar spine. No suspicious osseous lesions or acute fractures. A large ventral abdominal hernia is present containing   nonobstructed colon and small bowel.                                                                   IMPRESSION:      1.  1 cm stone in the right renal pelvis with marked adjacent fat stranding indicating  superimposed infection. Recommend correlation with urinalysis. No hydronephrosis.     2.  Additional 1 mm nonobstructing stone in the right kidney.     3.  Large ventral abdominal hernia containing nonobstructed colon and small bowel.     4.  Colonic diverticulosis without acute diverticulitis.         Impression and Plan:   Impression:   89-year-old female with complex medical history including heart failure, atrial fibrillation diabetes mellitus recent hospital admission where she was noted to have some flank and back pain and found to have a 1 cm right kidney stone      Plan:   1 cm right kidney stone  - I again reviewed her labs with a stable serum creatinine  - I reviewed her urine culture and she did ultimately have a UTI on 12/31/2023 which was appropriately treated and did not involve pyelonephritis  - I reviewed her CT scan and reviewed these images personally.  I agree with radiologist interpretation.  We discussed that she does have a stone in her renal pelvis which does not appear to be causing any obstruction or impacting her kidney function  - Given that she is asymptomatic from the stone with multiple medical comorbidities, I would recommend treatment of the stone only if more urgently indicated  - She may follow-up with urology on a as needed basis  - She is quite happy with this plan     Thank you for the kind consultation.    Time spent: 20 minutes spent on the date of the encounter doing chart review, history and exam, documentation and further activities as noted above.    Hai Herrera MD   Urology  [facility]  [facility] Phone: [phone]  [facility] Phone: [phone]

## 2024-01-11 ENCOUNTER — OFFICE VISIT (OUTPATIENT)
Dept: FAMILY MEDICINE | Facility: CLINIC | Age: 89
End: 2024-01-11
Payer: COMMERCIAL

## 2024-01-11 VITALS
RESPIRATION RATE: 20 BRPM | DIASTOLIC BLOOD PRESSURE: 70 MMHG | BODY MASS INDEX: 41 KG/M2 | HEART RATE: 68 BPM | TEMPERATURE: 97.2 F | SYSTOLIC BLOOD PRESSURE: 131 MMHG | WEIGHT: 261.8 LBS | OXYGEN SATURATION: 95 %

## 2024-01-11 DIAGNOSIS — F32.1 MODERATE MAJOR DEPRESSION (H): ICD-10-CM

## 2024-01-11 DIAGNOSIS — G47.33 OSA (OBSTRUCTIVE SLEEP APNEA): ICD-10-CM

## 2024-01-11 DIAGNOSIS — E66.2 OBESITY HYPOVENTILATION SYNDROME (H): ICD-10-CM

## 2024-01-11 DIAGNOSIS — I50.30 HEART FAILURE WITH PRESERVED EJECTION FRACTION, NYHA CLASS I (H): Primary | ICD-10-CM

## 2024-01-11 DIAGNOSIS — K21.9 GASTROESOPHAGEAL REFLUX DISEASE WITHOUT ESOPHAGITIS: ICD-10-CM

## 2024-01-11 DIAGNOSIS — E11.21 TYPE 2 DIABETES MELLITUS WITH DIABETIC NEPHROPATHY, WITHOUT LONG-TERM CURRENT USE OF INSULIN (H): ICD-10-CM

## 2024-01-11 DIAGNOSIS — E66.01 MORBID OBESITY (H): ICD-10-CM

## 2024-01-11 DIAGNOSIS — I48.91 ATRIAL FIBRILLATION WITH RVR (H): ICD-10-CM

## 2024-01-11 PROCEDURE — 99495 TRANSJ CARE MGMT MOD F2F 14D: CPT | Performed by: INTERNAL MEDICINE

## 2024-01-11 RX ORDER — BUPROPION HYDROCHLORIDE 100 MG/1
100 TABLET, EXTENDED RELEASE ORAL DAILY
Qty: 90 TABLET | Refills: 3 | Status: SHIPPED | OUTPATIENT
Start: 2024-01-11

## 2024-01-11 RX ORDER — PANTOPRAZOLE SODIUM 20 MG/1
20 TABLET, DELAYED RELEASE ORAL DAILY
Qty: 90 TABLET | Refills: 3 | Status: SHIPPED | OUTPATIENT
Start: 2024-01-11

## 2024-01-11 RX ORDER — METOPROLOL TARTRATE 25 MG/1
75 TABLET, FILM COATED ORAL 2 TIMES DAILY
Qty: 540 TABLET | Refills: 1 | Status: SHIPPED | OUTPATIENT
Start: 2024-01-11 | End: 2024-08-29

## 2024-01-11 RX ORDER — DULOXETIN HYDROCHLORIDE 60 MG/1
60 CAPSULE, DELAYED RELEASE ORAL DAILY
Qty: 90 CAPSULE | Refills: 3 | Status: SHIPPED | OUTPATIENT
Start: 2024-01-11

## 2024-01-11 ASSESSMENT — PAIN SCALES - GENERAL: PAINLEVEL: NO PAIN (0)

## 2024-01-11 NOTE — PROGRESS NOTES
"  {PROVIDER CHARTING PREFERENCE:838506}    Guanako Simons is a 89 year old, presenting for the following health issues:  Hospital F/U  {(!) Visit Details have not yet been documented.  Please enter Visit Details and then use this list to pull in documentation. (Optional):190961}    HPI         1/2/2024    12:19 PM   Post Discharge Outreach   Admission Date 12/27/2023   Reason for Admission A. Fib with RVR , CHF, CAD, NSTEMI type II, asthma, depression/anxiety   Discharge Date 12/31/2023   Discharge Diagnosis A. Fib with RVR , CHF, CAD, NSTEMI type II, asthma, depression/anxiety   How are you doing now that you are home? \"I'm ok, I fell out of bed yesterday but didn't hit my head or break anything\"   How are your symptoms? (Red Flag symptoms escalate to triage hotline per guidelines) Improved   Do you feel your condition is stable enough to be safe at home until your provider visit? Yes   Does the patient have their discharge instructions?  Yes   Does the patient have questions regarding their discharge instructions?  No   Were you started on any new medications or were there changes to any of your previous medications?  Yes   Does the patient have all of their medications? Yes   Do you have questions regarding any of your medications?  No   Do you have all of your needed medical supplies or equipment (DME)?  (i.e. oxygen tank, CPAP, cane, etc.) Yes   Discharge follow-up appointment scheduled within 14 calendar days?  Yes   Discharge Follow Up Appointment Date 1/11/2024   Discharge Follow Up Appointment Scheduled with? Primary Care Provider     Hospital Follow-up Visit:    Hospital/Nursing Home/IP Rehab Facility: {INPT AND SNF DISCHARGE:269057}  Date of Admission: ***  Date of Discharge: ***  Reason(s) for Admission: ***    Was your hospitalization related to COVID-19? {Yes add questions_No:080591}  Problems taking medications regularly:  {:332829}  Medication changes since discharge: {:654664}  Problems adhering " "to non-medication therapy:  {:640012}    Summary of hospitalization:  {:984242}  Diagnostic Tests/Treatments reviewed.  Follow up needed: {:423098:}  Other Healthcare Providers Involved in Patient s Care:         {those currently involved after discharge:826722::\"None\"}  Update since discharge: {:887114} {TIP  Include information from family/caregivers, SNF, Care Coordination :371605}        Plan of care communicated with {:352165}     {Reference  Coding guidelines- Moderate Complexity F2F/Video within 7 - 14 days of discharge 8832214, High Complexity F2F/Video within 7 days 2684924 or [id number] days [id number] :129087}      {additonal problems for provider to add (Optional):178228}      Review of Systems   {ROS COMP (Optional):724907}      Objective    /70 (BP Location: Right arm, Patient Position: Sitting, Cuff Size: Adult Large)   Pulse 68   Temp 97.2  F (36.2  C) (Temporal)   Resp 20   Wt 118.8 kg (261 lb 12.8 oz)   SpO2 95%   BMI 41.00 kg/m    Body mass index is 41 kg/m .  Physical Exam   {Exam List (Optional):359323}    {Diagnostic Test Results (Optional):469065}    {AMBULATORY ATTESTATION (Optional):258845}            "

## 2024-01-11 NOTE — PATIENT INSTRUCTIONS
Make sure that you look at your medication bottles and medication list  Let us know if there is any discrepancy  Make appointment with sleep specialist  Keep follow up appointment for 2/22 as scheduled

## 2024-01-11 NOTE — PROGRESS NOTES
Kristyn was seen today for hospital f/u.    Diagnoses and all orders for this visit:    Heart failure with preserved ejection fraction, NYHA class I (H)  -     Torsemide 40 MG TABS; Take 40 mg by mouth daily  Patient takes Torsemide 40 mg daily  Patient followed by cardiology  She cannot afford some of the medications that are good for her heart including Jardiance.  She did not bring her medication list or medication with her    ELIGIO (obstructive sleep apnea)  -     Adult Sleep Eval & Management  Referral; Future  Patient confirms she has not ordered a new CPAP machine yet. [facility] I referred patient to sleep specialist so that she can have another sleep study done and order a new machine.  Per [facility]/16:  She indicates that she was diagnosed with obstructive sleep apnea almost 20 years ago. She says that she has been using her CPAP machine since that period of time. She did have a break earlier this year where she was not using it related at least in part to difficulties with her machine.   She says that I am bad about doing things which I am supposed to do    Obesity hypoventilation syndrome (H)  -     Adult Sleep Eval & Management  Referral; Future  She is very obese that also affects her breathing.  Patient was on oxygen in hospital 12/27/23 - 12/31/2023 because she was having palpitations but she took herself off of it and called the hospital to tell them that she discontinued her oxygen because it burned her nose and she confirms she has been stable ever since.    Type 2 diabetes mellitus with diabetic nephropathy, without long-term current use of insulin (H)  Patient takes glimepiride 2 mg 2x daily.  Patient has an appointment scheduled 02/27, we will do labs.    Atrial fibrillation with RVR (H)  -     metoprolol tartrate (LOPRESSOR) 25 MG tablet; Take 3 tablets (75 mg) by mouth 2 times daily  Patient  "takes Lopressor 3 25 mg tablets 2x daily.  Per note 12/27/23:  Moira Andre is a 89-year-old female with multiple comorbidities as mentioned above, found to be in A-fib with RVR with a heart rate of 142 on admission. She was started on IV Cardizem infusion with improvement in rate control.  Admitted to inpatient under IMC status.  Cardiology consult requested, I appreciate Dr. Nelson's evaluation and recommendations  Discontinued IMC status, no longer requires diltiazem infusion.  Per Dr. Nelson, \"We have uptitrated dose of metoprolol to 75 mg twice daily.  Further uptitration will possibly be limited by soft blood pressure.  Not a candidate for digoxin due to CKD.\"  Continue PTA Eliquis.  Discontinued Plavix per cardiology recommendations  Her symptoms have improved, she was able to drive herself here today.  Patient followed by home care nurse.    Morbid obesity (H)  Patient could not tolerat Mounjaro     Moderate major depression (H)  -     DULoxetine (CYMBALTA) 60 MG capsule; Take 1 capsule (60 mg) by mouth daily  -     buPROPion (WELLBUTRIN SR) 100 MG 12 hr tablet; Take 1 tablet (100 mg) by mouth daily for mood  Patient takes Cymbalta 60 mg daily and Wellbutrin 100 mg daily.    Gastroesophageal reflux disease without esophagitis  -     pantoprazole (PROTONIX) 20 MG EC tablet; Take 1 tablet (20 mg) by mouth daily  Patient takes Protonix 20 mg daily.    Other  She did not bring a medication list or her medications so she was not sure about what she is taking. I told her to let me know if there are any discrepancies in her medications from today's appointment when she goes home and has a chance to look at her medications.      I clearly explained that she should not take both Bumex and torsemide to get  She needs to take torsemide  I gave her the medication list to make sure she understands these meds  If there is any discrepancy she would let me know    Subjective   Kristyn is a 89 year old, presenting for the " "following health issues:  Hospital F/U      SURESH Simons is a 89 year old, presenting for the following health issues:  Hospital F/U        1/2/2024    12:19 PM   Post Discharge Outreach   Admission Date 12/27/2023   Reason for Admission A. Fib with RVR , CHF, CAD, NSTEMI type II, asthma, depression/anxiety   Discharge Date 12/31/2023   Discharge Diagnosis A. Fib with RVR , CHF, CAD, NSTEMI type II, asthma, depression/anxiety   How are you doing now that you are home? \"I'm ok, I fell out of bed yesterday but didn't hit my head or break anything\"   How are your symptoms? (Red Flag symptoms escalate to triage hotline per guidelines) Improved   Do you feel your condition is stable enough to be safe at home until your provider visit? Yes   Does the patient have their discharge instructions?  Yes   Does the patient have questions regarding their discharge instructions?  No   Were you started on any new medications or were there changes to any of your previous medications?  Yes   Does the patient have all of their medications? Yes   Do you have questions regarding any of your medications?  No   Do you have all of your needed medical supplies or equipment (DME)?  (i.e. oxygen tank, CPAP, cane, etc.) Yes   Discharge follow-up appointment scheduled within 14 calendar days?  Yes   Discharge Follow Up Appointment Date 1/11/2024   Discharge Follow Up Appointment Scheduled with? Primary Care Provider     Hospital Follow-up Visit:    Hospital/Nursing Home/IP Rehab Facility: [facility]  Date of Admission: 12/27/23  Date of Discharge: 12/31/23  Reason(s) for Admission: Atrial fibrillation with RVR  Chronic anticoagulation with Eliquis  Acute on chronic diastolic congestive heart failure/HFpEF  Acute hypoxic respiratory failure due to acute exacerbation of chronic diastolic heart failure, likely obesity hypoventilation and untreated obstructive sleep apnea  Moderate aortic stenosis  Moderate mitral " stenosis  Coronary artery disease  NSTEMI type II  Microscopic hematuria, possibly due to UTI  Hypertension  Hyperlipidemia  Type 2 diabetes mellitus  Stage IIIb chronic kidney disease  Obstructive sleep apnea  Asthma  Depression  Anxiety  GERD    Was your hospitalization related to COVID-19? No   Problems taking medications regularly:  None  Medication changes since discharge: None  Problems adhering to non-medication therapy:  None    Summary of hospitalization:  [facility] discharge summary reviewed  Diagnostic Tests/Treatments reviewed.  Follow up needed: cardiology  Other Healthcare Providers Involved in Patient s Care:         Homecare  Update since discharge: improved.         Plan of care communicated with patient               Review of Systems   Constitutional, HEENT, cardiovascular, pulmonary, GI, , musculoskeletal, neuro, skin, endocrine and psych systems are negative, except as otherwise noted.      Objective    /70 (BP Location: Right arm, Patient Position: Sitting, Cuff Size: Adult Large)   Pulse 68   Temp 97.2  F (36.2  C) (Temporal)   Resp 20   Wt 118.8 kg (261 lb 12.8 oz)   SpO2 95%   BMI 41.00 kg/m    Body mass index is 41 kg/m .  Physical Exam   GENERAL APPEARANCE: healthy, alert and no distress  EYES: Eyes grossly normal to inspection, PERRL and conjunctivae and sclerae normal  HENT: ear canals and TM's normal and nose and mouth without ulcers or lesions  NECK: no adenopathy  RESP: lungs clear to auscultation - no rales, rhonchi or wheezes  CV: regular rates and rhythm, normal S1 S2, no S3 irregular hb dt Afib  She drove herself and she was able to walk with the help of walker      This document serves as a record of the services and decisions personally performed and made by Dr. Churchill. It was created on her behalf by Jeanette Messer, a trained medical scribe. The creation of this document is based the provider's statements to the medical scribe.

## 2024-01-18 DIAGNOSIS — I50.30 HEART FAILURE WITH PRESERVED EJECTION FRACTION, NYHA CLASS I (H): ICD-10-CM

## 2024-01-18 RX ORDER — TORSEMIDE 20 MG/1
40 TABLET ORAL DAILY
Qty: 270 TABLET | Refills: 1 | Status: ON HOLD | OUTPATIENT
Start: 2024-01-18 | End: 2024-08-07

## 2024-01-18 NOTE — PROGRESS NOTES
I got the note from [employer] that insurance would not cover torsemide 40 mg  They will cover 20 mg tablet  I changed the prescription from 40 mg to 20 mg 2 tablets daily  My MA will notify the patient  Dr.Nasima Kerline MD

## 2024-01-19 ENCOUNTER — MEDICAL CORRESPONDENCE (OUTPATIENT)
Dept: HEALTH INFORMATION MANAGEMENT | Facility: CLINIC | Age: 89
End: 2024-01-19

## 2024-02-02 ENCOUNTER — TRANSFERRED RECORDS (OUTPATIENT)
Dept: HEALTH INFORMATION MANAGEMENT | Facility: CLINIC | Age: 89
End: 2024-02-02
Payer: COMMERCIAL

## 2024-02-22 ENCOUNTER — OFFICE VISIT (OUTPATIENT)
Dept: FAMILY MEDICINE | Facility: CLINIC | Age: 89
End: 2024-02-22
Payer: COMMERCIAL

## 2024-02-22 VITALS
TEMPERATURE: 98.1 F | WEIGHT: 261 LBS | OXYGEN SATURATION: 93 % | SYSTOLIC BLOOD PRESSURE: 136 MMHG | DIASTOLIC BLOOD PRESSURE: 86 MMHG | BODY MASS INDEX: 40.97 KG/M2 | HEART RATE: 114 BPM | HEIGHT: 67 IN

## 2024-02-22 DIAGNOSIS — I50.30 HEART FAILURE WITH PRESERVED EJECTION FRACTION, NYHA CLASS I (H): Primary | ICD-10-CM

## 2024-02-22 DIAGNOSIS — N20.0 KIDNEY STONE: ICD-10-CM

## 2024-02-22 DIAGNOSIS — E66.01 MORBID OBESITY (H): ICD-10-CM

## 2024-02-22 DIAGNOSIS — I48.91 ATRIAL FIBRILLATION WITH RVR (H): ICD-10-CM

## 2024-02-22 DIAGNOSIS — R29.6 RECURRENT FALLS: ICD-10-CM

## 2024-02-22 DIAGNOSIS — G47.33 OSA (OBSTRUCTIVE SLEEP APNEA): ICD-10-CM

## 2024-02-22 DIAGNOSIS — E66.2 OBESITY HYPOVENTILATION SYNDROME (H): ICD-10-CM

## 2024-02-22 DIAGNOSIS — N39.46 MIXED STRESS AND URGE URINARY INCONTINENCE: ICD-10-CM

## 2024-02-22 DIAGNOSIS — E78.5 HYPERLIPIDEMIA LDL GOAL <70: ICD-10-CM

## 2024-02-22 DIAGNOSIS — N18.32 STAGE 3B CHRONIC KIDNEY DISEASE (H): ICD-10-CM

## 2024-02-22 DIAGNOSIS — E11.21 TYPE 2 DIABETES MELLITUS WITH DIABETIC NEPHROPATHY, WITHOUT LONG-TERM CURRENT USE OF INSULIN (H): ICD-10-CM

## 2024-02-22 LAB
ALBUMIN SERPL BCG-MCNC: 4.2 G/DL (ref 3.5–5.2)
ANION GAP SERPL CALCULATED.3IONS-SCNC: 17 MMOL/L (ref 7–15)
BUN SERPL-MCNC: 44.3 MG/DL (ref 8–23)
CALCIUM SERPL-MCNC: 9.6 MG/DL (ref 8.2–9.6)
CHLORIDE SERPL-SCNC: 101 MMOL/L (ref 98–107)
CREAT SERPL-MCNC: 1.94 MG/DL (ref 0.51–0.95)
CREAT UR-MCNC: 73.2 MG/DL
DEPRECATED HCO3 PLAS-SCNC: 24 MMOL/L (ref 22–29)
EGFRCR SERPLBLD CKD-EPI 2021: 24 ML/MIN/1.73M2
ERYTHROCYTE [DISTWIDTH] IN BLOOD BY AUTOMATED COUNT: 15.5 % (ref 10–15)
GLUCOSE SERPL-MCNC: 135 MG/DL (ref 70–99)
HBA1C MFR BLD: 7.7 % (ref 0–5.6)
HCT VFR BLD AUTO: 46 % (ref 35–47)
HGB BLD-MCNC: 13.7 G/DL (ref 11.7–15.7)
MCH RBC QN AUTO: 25 PG (ref 26.5–33)
MCHC RBC AUTO-ENTMCNC: 29.8 G/DL (ref 31.5–36.5)
MCV RBC AUTO: 84 FL (ref 78–100)
MICROALBUMIN UR-MCNC: 202 MG/L
MICROALBUMIN/CREAT UR: 275.96 MG/G CR (ref 0–25)
PHOSPHATE SERPL-MCNC: 4.2 MG/DL (ref 2.5–4.5)
PLATELET # BLD AUTO: 313 10E3/UL (ref 150–450)
POTASSIUM SERPL-SCNC: 4.7 MMOL/L (ref 3.4–5.3)
RBC # BLD AUTO: 5.49 10E6/UL (ref 3.8–5.2)
SODIUM SERPL-SCNC: 142 MMOL/L (ref 135–145)
WBC # BLD AUTO: 8.4 10E3/UL (ref 4–11)

## 2024-02-22 PROCEDURE — 36415 COLL VENOUS BLD VENIPUNCTURE: CPT | Performed by: INTERNAL MEDICINE

## 2024-02-22 PROCEDURE — 80069 RENAL FUNCTION PANEL: CPT | Performed by: INTERNAL MEDICINE

## 2024-02-22 PROCEDURE — 82043 UR ALBUMIN QUANTITATIVE: CPT | Performed by: INTERNAL MEDICINE

## 2024-02-22 PROCEDURE — 83036 HEMOGLOBIN GLYCOSYLATED A1C: CPT | Performed by: INTERNAL MEDICINE

## 2024-02-22 PROCEDURE — 99214 OFFICE O/P EST MOD 30 MIN: CPT | Performed by: INTERNAL MEDICINE

## 2024-02-22 PROCEDURE — 82570 ASSAY OF URINE CREATININE: CPT | Performed by: INTERNAL MEDICINE

## 2024-02-22 PROCEDURE — 85027 COMPLETE CBC AUTOMATED: CPT | Performed by: INTERNAL MEDICINE

## 2024-02-22 RX ORDER — ATORVASTATIN CALCIUM 20 MG/1
20 TABLET, FILM COATED ORAL DAILY
Qty: 100 TABLET | Refills: 3 | Status: SHIPPED | OUTPATIENT
Start: 2024-02-22

## 2024-02-22 ASSESSMENT — ASTHMA QUESTIONNAIRES
QUESTION_2 LAST FOUR WEEKS HOW OFTEN HAVE YOU HAD SHORTNESS OF BREATH: ONCE A DAY
ACT_TOTALSCORE: 20
QUESTION_3 LAST FOUR WEEKS HOW OFTEN DID YOUR ASTHMA SYMPTOMS (WHEEZING, COUGHING, SHORTNESS OF BREATH, CHEST TIGHTNESS OR PAIN) WAKE YOU UP AT NIGHT OR EARLIER THAN USUAL IN THE MORNING: NOT AT ALL
QUESTION_1 LAST FOUR WEEKS HOW MUCH OF THE TIME DID YOUR ASTHMA KEEP YOU FROM GETTING AS MUCH DONE AT WORK, SCHOOL OR AT HOME: NONE OF THE TIME
QUESTION_5 LAST FOUR WEEKS HOW WOULD YOU RATE YOUR ASTHMA CONTROL: SOMEWHAT CONTROLLED
QUESTION_4 LAST FOUR WEEKS HOW OFTEN HAVE YOU USED YOUR RESCUE INHALER OR NEBULIZER MEDICATION (SUCH AS ALBUTEROL): NOT AT ALL
ACT_TOTALSCORE: 20

## 2024-02-22 ASSESSMENT — PAIN SCALES - GENERAL: PAINLEVEL: NO PAIN (0)

## 2024-02-22 NOTE — PROGRESS NOTES
Kristyn was seen today for diabetes.    Diagnoses and all orders for this visit:    Heart failure with preserved ejection fraction, NYHA class I (H)  Follows cardiology, next appt 3/15  F/up w/ PCP scheduled 6/27/2024  Currently well compensated     ELIGIO (obstructive sleep apnea)  Appt 4/24 with sleep specialist  Mask broke on old machine.  [facility]16:  She indicates that she was diagnosed with obstructive sleep apnea almost 20 years ago. She says that she has been using her CPAP machine since that period of time. She did have a break earlier this year where she was not using it related at least in part to difficulties with her machine.    Obesity hypoventilation syndrome (H)  She is very obese that also affects her breathing.    Patient was on oxygen in hospital 12/27/23 - 12/31/2023 because she was having palpitations but she took herself off of it and called the hospital to tell them that she discontinued her oxygen because it burned her nose and she confirms she has been stable ever since.    Type 2 diabetes mellitus with diabetic nephropathy, without long-term current use of insulin (H)  -     Hemoglobin A1c; Future  -     Albumin Random Urine Quantitative with Creat Ratio; Future  Patient takes glimepiride 2mg 2/day.   Could not tolerate GLP1  Cannot afford expensive medication like Jardiance     Atrial fibrillation with RVR (H)  -     apixaban ANTICOAGULANT (ELIQUIS) 2.5 MG tablet; Take 1 tablet (2.5 mg) by mouth 2 times daily  Takes Eliquis 2.5mg 2/day and Lopressor 75mg 2/day.  Discussed trying Warfarin d/t lower cost, declined d/t medication maintenance.  Per note 12/27/23:  Moira Andre is a 89-year-old female with multiple comorbidities as mentioned above, found to be in A-fib with RVR with a heart rate of 142 on admission. She was started on IV Cardizem infusion with improvement in rate control.  Admitted to inpatient under IMC status.  Cardiology consult requested, I appreciate   "Omar's evaluation and recommendations  Discontinued IMC status, no longer requires diltiazem infusion.  Per Dr. Nelson, \"We have uptitrated dose of metoprolol to 75 mg twice daily.  Further uptitration will possibly be limited by soft blood pressure.  Not a candidate for digoxin due to CKD.\"  Continue PTA Eliquis.  Discontinued Plavix per cardiology recommendations  She was able to drive herself here today.   Follows home care nurse who visits 1/year.    Morbid obesity (H)  Did not tolerate Mounjaro and Ozempic    Stage 3b chronic kidney disease (H)  -     Renal panel (Alb, BUN, Ca, Cl, CO2, Creat, Gluc, Phos, K, Na); Future  -     CBC with platelets; Future  -     Albumin Random Urine Quantitative with Creat Ratio; Future  Takes glimepiride for diabetes   Cannot afford SGLT2    Recurrent falls  -     Primary Care - Care Coordination Referral; Future  Reported falling out of bed 4x in last month. Started using walker at home, she used to use it only when not at home. Advised to get side rails for her home.  Discussed moving to assisted living, patient has toured one but she declined d/t cost, but she's still looking at other options. Reminded patient assisted living facilities offer meals and services other than housing.  Discussed continuing blood thinner d/t recent reoccurring falls, but elected to continue.  Counseled on fall precautions.  Reported children do not visit often, but son lives only 3 miles away.  Offered care coordinator referral to find informatin and patient accepted.  Referral sent.  Patient needs to be in assisted living due to overall condition is deteriorating slowly     Hyperlipidemia LDL goal <70  -     atorvastatin (LIPITOR) 20 MG tablet; Take 1 tablet (20 mg) by mouth daily for cholesterol  Takes Lipitor 20mg/day    Kidney stone  Follows urology  Conservative treatment due to multiple comorbidities.    Mixed stress and urge urinary incontinence  -     Commode Chair Order for DME - ONLY " "FOR DME  -     Incontinence Supplies Order for DME - ONLY FOR DME  Rx bedside commode chair sent, patient will check supply store upstairs following today's appt.  Uses 3 XXL pads/day.  Rx pads sent.    Fall prevention counseling is done     Subjective   Kristyn is a 89 year old, presenting for the following health issues:  Diabetes    History of Present Illness       Diabetes:   She presents for follow up of diabetes.  She is checking home blood glucose one time daily.   She checks blood glucose before meals.  Blood glucose is never over 200 and never under 70. She is aware of hypoglycemia symptoms including none.    She has no concerns regarding her diabetes at this time.  She is having excessive thirst.        She exercises with enough effort to increase her heart rate 9 or less minutes per day.  She exercises with enough effort to increase her heart rate 3 or less days per week.   She is taking medications regularly.       Review of Systems  Constitutional, HEENT, cardiovascular, pulmonary, GI, , musculoskeletal, neuro, skin, endocrine and psych systems are negative, except as otherwise noted.      Objective    /86 (BP Location: Left arm, Patient Position: Sitting, Cuff Size: Adult Large)   Pulse 114   Temp 98.1  F (36.7  C) (Tympanic)   Ht 1.702 m (5' 7\")   Wt 118.4 kg (261 lb)   SpO2 93%   BMI 40.88 kg/m    Body mass index is 40.88 kg/m .    Physical Exam   She is very nice and pleasant.  She is comfortable and not in any kind of distress.  She is fully alert awake oriented.   She uses walker     Labs pending      Signed Electronically by: Maryan Churchill MD    This document serves as a record of the services and decisions personally performed and made by Dr. Churchill. It was created on her behalf by Jeanette Messer, a trained medical scribe. The creation of this document is based the provider's statements to the medical scribe.       "

## 2024-02-22 NOTE — LETTER
February 23, 2024      Kristyn Andre  [address]  [place] [address]        Dear ,    We are writing to inform you of your test results.    Mert Pizarro,     This is to inform you regarding your test result.     Urine test is positive for microalbumin   Numbers are going down   Chronic kidney disease is stable   CBC result which includes Hemoglobin and  Platelet Counts is satisfactory.   HbA1c which is average glucose during last 3 months is 7.7%   Avoid dehydration   Follow up with your nephrologist       Resulted Orders   Hemoglobin A1c   Result Value Ref Range    Hemoglobin A1C 7.7 (H) 0.0 - 5.6 %      Comment:      Normal <5.7%   Prediabetes 5.7-6.4%    Diabetes 6.5% or higher     Note: Adopted from ADA consensus guidelines.   Renal panel (Alb, BUN, Ca, Cl, CO2, Creat, Gluc, Phos, K, Na)   Result Value Ref Range    Sodium 142 135 - 145 mmol/L      Comment:      Reference intervals for this test were updated on 09/26/2023 to more accurately reflect our healthy population. There may be differences in the flagging of prior results with similar values performed with this method. Interpretation of those prior results can be made in the context of the updated reference intervals.     Potassium 4.7 3.4 - 5.3 mmol/L    Chloride 101 98 - 107 mmol/L    Carbon Dioxide (CO2) 24 22 - 29 mmol/L    Anion Gap 17 (H) 7 - 15 mmol/L    Glucose 135 (H) 70 - 99 mg/dL    Urea Nitrogen 44.3 (H) 8.0 - 23.0 mg/dL    Creatinine 1.94 (H) 0.51 - 0.95 mg/dL    GFR Estimate 24 (L) >60 mL/min/1.73m2    Calcium 9.6 8.2 - 9.6 mg/dL    Albumin 4.2 3.5 - 5.2 g/dL    Phosphorus 4.2 2.5 - 4.5 mg/dL   CBC with platelets   Result Value Ref Range    WBC Count 8.4 4.0 - 11.0 10e3/uL    RBC Count 5.49 (H) 3.80 - 5.20 10e6/uL    Hemoglobin 13.7 11.7 - 15.7 g/dL    Hematocrit 46.0 35.0 - 47.0 %    MCV 84 78 - 100 fL    MCH 25.0 (L) 26.5 - 33.0 pg    MCHC 29.8 (L) 31.5 - 36.5 g/dL    RDW 15.5 (H) 10.0 - 15.0 %    Platelet Count  313 150 - 450 10e3/uL   Albumin Random Urine Quantitative with Creat Ratio   Result Value Ref Range    Creatinine Urine mg/dL 73.2 mg/dL      Comment:      The reference ranges have not been established in urine creatinine. The results should be integrated into the clinical context for interpretation.    Albumin Urine mg/L 202.0 mg/L      Comment:      The reference ranges have not been established in urine albumin. The results should be integrated into the clinical context for interpretation.    Albumin Urine mg/g Cr 275.96 (H) 0.00 - 25.00 mg/g Cr      Comment:      Microalbuminuria is defined as an albumin:creatinine ratio of 17 to 299 for males and 25 to 299 for females. A ratio of albumin:creatinine of 300 or higher is indicative of overt proteinuria.  Due to biologic variability, positive results should be confirmed by a second, first-morning random or 24-hour timed urine specimen. If there is discrepancy, a third specimen is recommended. When 2 out of 3 results are in the microalbuminuria range, this is evidence for incipient nephropathy and warrants increased efforts at glucose control, blood pressure control, and institution of therapy with an angiotensin-converting-enzyme (ACE) inhibitor (if the patient can tolerate it).         If you have any questions or concerns, please call the clinic at the number listed above.         Sincerely,       Dr.Nasima Kerline MD,FACP

## 2024-02-22 NOTE — PATIENT INSTRUCTIONS
Take all the fall precautions   Labs today  Follow up in 4 months.  Seek sooner medical attention if there is any worsening of symptoms or problems.

## 2024-02-23 ENCOUNTER — PATIENT OUTREACH (OUTPATIENT)
Dept: CARE COORDINATION | Facility: CLINIC | Age: 89
End: 2024-02-23
Payer: COMMERCIAL

## 2024-02-23 DIAGNOSIS — Z71.89 OTHER SPECIFIED COUNSELING: Primary | Chronic | ICD-10-CM

## 2024-02-23 NOTE — RESULT ENCOUNTER NOTE
Please notify patient by sending following letter with copy of test results      Mert Pizarro,    This is to inform you regarding your test result.    Urine test is positive for microalbumin  Numbers are going down  Chronic kidney disease is stable  CBC result which includes Hemoglobin and  Platelet Counts is satisfactory.  HbA1c which is average glucose during last 3 months is 7.7%  Avoid dehydration  Follow up with your nephrologist       Sincerely,      Dr.Nasima Kerline MD,FACP

## 2024-02-23 NOTE — PROGRESS NOTES
Clinic Care Coordination Contact  Community Health Worker Initial Outreach    CHW introduced self, intent of call, and offered the CC program.    Patient stated she has fallen four times since January.  Patient stated she does not have home care.  Patient stated she received a over $3,000 bill from [facility] for her inpatient stay.  Patient stated she does not have the funds to pay this bill.    FRW:  [facility]  S[employer]    Reason for Referral:  Complex Medical Concerns/Education  Chronic Diagnosis    pt has multiple comorbidities and recurrent falls she will need placement     CHW Initial Information Gathering:  Referral Source: PCP  Preferred Hospital: [facility]  [phone]  Current living arrangement:: I live alone  Type of residence:: Apartment  Community Resources: None  Supplies Currently Used at Home: Incontinence Supplies, Diabetic Supplies  Equipment Currently Used at Home: walker, rolling  Informal Support system:: Children, Family  No PCP office visit in Past Year: No  Transportation means:: Regular car  CHW Additional Questions  If ED/Hospital discharge, follow-up appointment scheduled as recommended?: N/A  Medication changes made following ED/Hospital discharge?: N/A  MyChart active?: No  Patient agreeable to assistance with activating MyChart?: No    Patient accepts CC: Yes. Patient scheduled for assessment with ARGENTINA Lisa on 2/28/24 at 2:00pm. Patient noted desire to discuss home care, assisted living, in home support and CC support.     BEATRIZ Velazquez  Clinic Care Coordination  [facility]: [facility], [facility], [facility], and [facility]  Phone: [phone]

## 2024-02-28 ENCOUNTER — PATIENT OUTREACH (OUTPATIENT)
Dept: NURSING | Facility: CLINIC | Age: 89
End: 2024-02-28
Payer: COMMERCIAL

## 2024-02-28 ASSESSMENT — ACTIVITIES OF DAILY LIVING (ADL): DEPENDENT_IADLS:: INDEPENDENT;CLEANING

## 2024-02-28 NOTE — LETTER
[facility] CARE COORDINATION  [address]  [facility] [address]    February 29, 2024    Moira Andre  [address]  [facility] [address]      Dear Moira,    I am a clinic care coordinator who works with Maryan Churchill MD with the [facility]. I wanted to thank you for spending the time to talk with me.  Below is a description of clinic care coordination and how I can further assist you.       The clinic care coordination team is made up of a registered nurse, , financial resource worker and community health worker who understand the health care system. The goal of clinic care coordination is to help you manage your health and improve access to the health care system. Our team works alongside your provider to assist you in determining your health and social needs. We can help you obtain health care and community resources, providing you with necessary information and education. We can work with you through any barriers and develop a care plan that helps coordinate and strengthen the communication between you and your care team.  Our services are voluntary and are offered without charge to you personally.    Please feel free to contact me with any questions or concerns regarding care coordination and what we can offer.      We are focused on providing you with the highest-quality healthcare experience possible.    Sincerely,     Maria L Mckeon, [facility]  Clinic Care Coordinator  [facility]  [phone]      Enclosed: [facility]    [facility]: Gorge Colin [phone]

## 2024-02-28 NOTE — PROGRESS NOTES
Clinic Care Coordination Contact  Clinic Care Coordination Contact  OUTREACH    Referral Information:  Referral Source: PCP    SW spoke to pt at length about her overall wellbeing and her needs and goals.  Pt shares that she has lived in her apartment for about 8 years and is very happy there and that her neighbors are very nice.  The downside is that her rent was just increased $50 per month and that is significant as it is over $1200/month and her social security is $2000/month so she is struggling to meet her financial commitments each month with inflation for groceries and utilities.  Pt also says that it is harder to socialize and she is a very social person.  Pt said she is a bit lonely and isolated.  Pt also shares that she has fallen 4 times and her PCP has encouraged her to consider looking at [facility].  Pt says she has toured [facility] in [place] and really liked it, but it was more than she can afford.  SW mentions that all CHONG's are going to be at least $3,000 or more per month and pt says she has noticed that.  We talked about waivers and what that would entail, and pt is still unsure about whether she would qualify with her bit of savings.  Pt has not made any safety accommodations to her apartment, but has done some small changes to make it safer for her such as putting pillows on her nightstand so she doesn't hit her head on it if she falls out of bed (she hit her head on her nightstand recently when she fell getting out of bed).  Pt shares that she is involved in her [employer], but she would like to be more involved, but she gets somewhat unmotivated and low energy and this has held her back.  Pt also voices that she would be interested in doing some volunteer work, but the barrier with that has been that many volunteer roles are in the evening and she is not interested in being out in the evening, only during the day.  Pt does ask if she is able to come and meet with SW in-person during our monthly  meetings, and SW agrees that we can meet in-person if that is something that would support pt in her goals.  Pt expresses kateryna at this news.  Pt says she still drives, but only to the clinic and grocery shopping and [employer].  SW spoke to pt about [employer] and how she could use this program to stretch her grocery budget as well as add healthy food.  Pt enthusiastically agrees, and talked about how this might be the incentive she needs to change up her food routines.  Pt said she used to cook a lot more and maybe this will make that possible.  Pt said that currently she eats only two meals per day and that they are mostly prepared food.    Pt is agreeable to CCC, and would like to talk about [facility], E/W or A/C, [employer], volunteer or employment opportunities for seniors,            Chief Complaint   Patient presents with    Clinic Care Coordination - Initial        Universal Utilization:      Utilization      No Show Count (past year)  5             ED Visits  4             Hospital Admissions  3                    Current as of: 2/28/2024  9:55 AM                Clinical Concerns:  Current Medical Concerns:  Psychosocial     Current Behavioral Concerns: N/A    Education Provided to patient: CCC, E/W, A/C, [employer], [employer]      Health Maintenance Reviewed: Due/Overdue   Health Maintenance Due   Topic Date Due    ASTHMA ACTION PLAN  03/03/2023    URINE DRUG SCREEN  06/27/2023    DIABETIC FOOT EXAM  10/24/2023    ZOSTER IMMUNIZATION (2 of 2) 08/11/2023       Clinical Pathway: None    Medication Management:  Medication review status: Medications reviewed and no changes reported per patient.             Functional Status:  Dependent ADLs:: Ambulation-walker  Dependent IADLs:: Independent, Cleaning  Bed or wheelchair confined:: No  Mobility Status: Independent w/Device  Fallen 2 or more times in the past year?: Yes  Any fall with injury in the past year?: Yes    Living Situation:  Current living arrangement:: I live  alone  Type of residence:: Apartment    Lifestyle & Psychosocial Needs:    Social Determinants of Health     Food Insecurity: Low Risk  (10/26/2023)    Food Insecurity     Within the past 12 months, did you worry that your food would run out before you got money to buy more?: No     Within the past 12 months, did the food you bought just not last and you didn t have money to get more?: No   Depression: Not at risk (1/11/2024)    PHQ-2     PHQ-2 Score: 0   Housing Stability: Low Risk  (10/26/2023)    Housing Stability     Do you have housing? : Yes     Are you worried about losing your housing?: No   Tobacco Use: Medium Risk (2/22/2024)    Patient History     Smoking Tobacco Use: Former     Smokeless Tobacco Use: Never     Passive Exposure: Not on file   Financial Resource Strain: Low Risk  (10/26/2023)    Financial Resource Strain     Within the past 12 months, have you or your family members you live with been unable to get utilities (heat, electricity) when it was really needed?: No   Alcohol Use: Not on file   Transportation Needs: Low Risk  (10/26/2023)    Transportation Needs     Within the past 12 months, has lack of transportation kept you from medical appointments, getting your medicines, non-medical meetings or appointments, work, or from getting things that you need?: No   Physical Activity: Not on file   Interpersonal Safety: Low Risk  (1/11/2024)    Interpersonal Safety     Do you feel physically and emotionally safe where you currently live?: Yes     Within the past 12 months, have you been hit, slapped, kicked or otherwise physically hurt by someone?: No     Within the past 12 months, have you been humiliated or emotionally abused in other ways by your partner or ex-partner?: No   Stress: No Stress Concern Present (6/10/2021)    [place] [employer] - Occupational Stress Questionnaire     Feeling of Stress : Not at all   Social Connections: Unknown (6/10/2021)    Social Connection  and Isolation Panel [NHANES]     Frequency of Communication with Friends and Family: Not on file     Frequency of Social Gatherings with Friends and Family: Not on file     Attends Gnosticist Services: Not on file     Active Member of Clubs or Organizations: Not on file     Attends Club or Organization Meetings: Not on file     Marital Status:               Gnosticist or spiritual beliefs that impact treatment:: No  Mental health DX:: Yes  Mental health DX how managed:: Medication  Chemical Dependency Status: No Current Concerns  Informal Support system:: Children, Family           Resources and Interventions:  Current Resources:      Community Resources: None  Supplies Currently Used at Home: Incontinence Supplies, Diabetic Supplies  Equipment Currently Used at Home: walker, rolling  Employment Status: retired         Advance Care Plan/Directive  Advanced Care Plans/Directives on file:: No    Referrals Placed: Senior Linkage Line, Community Resources         Care Plan:  Care Plan: Community Resources       Problem: Insufficient In-home support       Note:     Goal Statement: Kristyn will continue working with Care Coordination to ensure her needs are met for overall health and wellbeing.  Date Goal Set: 2/28/24  Barriers: Fixed income  Strengths: Strong support system  Date to Achieve By: 2/28/25  Patient expressed understanding of goal: yes  Action steps to achieve this goal:  1. I will continue to follow up with medical team as needed  2. I will sign up for [employer]  3. I will talk to a  to get more insights into [employer]  4. I will consider signing up for [employer] assessment  5. I will look into volunteer opportunities, possibly stamping classes at [facility]   6. I will outreach to Care Coordination  for further questions or concerns          Goal: Establish adequate home support                             Patient/Caregiver understanding: yes      Outreach Frequency:  monthly, more frequently as needed  Future Appointments                In 2 weeks Jozef Sinha MD [facility] [place], [facility] PSA CLIN    In 1 month Noam Nieto MD [facility]    In 4 months Maryan Churchill MD [facility],             Plan: SW to mail information on [place] [facility], [employer].     Maria L Mckeon,  [facility]  Clinic Care Coordinator  [facility]  [phone]  [email or web address]

## 2024-02-28 NOTE — LETTER
[facility]  Patient Centered Plan of Care  About Me:        Patient Name:  Moira Arriaga    YOB: 1933  Age:         89 year old   Xiang MRN:    [id number] Telephone Information:  Home Phone [phone]   Mobile [phone]       Address:  [address]  [place] [address] Email address:  [email or web address]      Emergency Contact(s)    Name Relationship Lgl Grd Work Phone Home Phone Mobile Phone   1. BETTYE ARRIAGA Daughter   [phone]   2. FRIEND Friend No  [phone]            Primary language:  English     needed? No   [place] [employer]:  [phone] op. 1  Other communication barriers:Glasses; Hearing aides; Access to technology    Preferred Method of Communication:  Mail  Current living arrangement: I live alone    Mobility Status/ Medical Equipment: Independent w/Device        Health Maintenance  Health Maintenance Reviewed: Due/Overdue   Health Maintenance Due   Topic Date Due    ASTHMA ACTION PLAN  03/03/2023    URINE DRUG SCREEN  06/27/2023    DIABETIC FOOT EXAM  10/24/2023    ZOSTER IMMUNIZATION (2 of 2) 08/11/2023           My Access Plan  Medical Emergency 911   Primary Clinic Line [facility] [phone]   24 Hour Appointment Line [phone] or  [phone]) (toll-free)   24 Hour Nurse Line [phone] (toll-free)   Preferred Urgent Care [facility], [phone]     [facility]  [phone]     Preferred Pharmacy [id number] [address]     Behavioral Health Crisis Line The National Suicide Prevention Lifeline at 1-852.834.2271 or Text/Call 408           My Care Team Members  Patient Care Team         Relationship Specialty Notifications Start End    Maryan Churchill MD PCP - General Internal Medicine  4/30/14     Phone: [phone] Pager: [phone] Fax:  [phone] [address] [place]                [place] [address]    Maryan Churchill MD Assigned PCP   9/12/21     Phone: [phone] Pager: [phone] Fax: [phone] [address] [place]                [place] [address]    Jessica Medrano MD MD Nephrology  5/6/22     Phone: [phone] Fax: [phone]         [address] [facility] [address] [facility] [address]    Lynnette Parks MD Assigned Nephrology Provider   9/17/22     Phone: [phone] Fax: [phone] [address] [facility] [address]    Liberty Morin, PharmD Assigned MTM Pharmacist   6/3/23     Phone: [phone] Fax: [phone] [address] [place] [address]    Gricel Sloan APRN CNP Nurse Practitioner Cardiovascular Disease  10/13/23     Phone: [phone] Fax: [phone] [address] [facility] [address]    Hai Herrera MD Assigned Surgical Provider   1/25/24     Phone: [phone] Fax: [phone] [address] [facility] [address]    Maria L Mckeon [facility] Lead Care Coordinator  Admissions 2/23/24     Phone: [phone]         Patrice Wiseman [facility] Financial Resource Worker   2/23/24     Phone: [phone]                     My Care Plans  Self Management and Treatment Plan    Care Plan  Care Plan: Community Resources       Problem: Insufficient In-home support       Note:     Goal Statement: Kristyn will continue working with Care Coordination to ensure her needs are met for overall health and wellbeing.  Date Goal Set: 2/28/24  Barriers: Fixed income  Strengths: Strong support system  Date to Achieve By: 2/28/25  Patient expressed understanding of goal: yes  Action steps to achieve this goal:  1. I will continue to follow up with medical team as needed  2. I will sign up for [employer]  3. I will talk to a  to get more insights into [facility]  4. I will consider signing up for M[employer]hoices assessment  5. I will look into  volunteer opportunities, possibly stamping classes at [facility]   6. I will outreach to Care Coordination  for further questions or concerns          Goal: Establish adequate home support                             Action Plans on File:   Asthma        Depression  Heart Failure       Advance Care Plans/Directives:   Advanced Care Plan/Directives on file:   No    Discussed with patient/caregiver(s): No data recorded           My Medical and Care Information  Problem List   Patient Active Problem List   Diagnosis    CKD (chronic kidney disease) stage 3, GFR 30-59 ml/min (H)    Morbid obesity (H)    CAD (coronary artery disease)    Type 2 diabetes mellitus with diabetic nephropathy (H)    Transient cerebral ischemia    Hyperlipidemia LDL goal <70    Ventral hernia    Intermittent asthma    Moderate major depression (H)    ELIGIO on CPAP    Microalbuminuria    S/P total knee arthroplasty    OA (osteoarthritis) of knee    Anemia due to blood loss, acute    Health Care Home    Essential hypertension    Gastroesophageal reflux disease without esophagitis    Bilateral edema of lower extremity    Osteoarthritis    High risk medication use    Anxiety    Other chronic pain    Controlled substance agreement signed    Edema of lower extremity    Nephrolithiasis    Kidney stone    Tremor    Atypical chest pain    ACS (acute coronary syndrome) (H)    Non-rheumatic mitral valve stenosis    Coronary artery calcification seen on CAT scan    Lumbar radiculopathy    Back muscle spasm    Obesity hypoventilation syndrome (H)    CPAP/BiPAP dependent    Spinal stenosis of lumbosacral region    Bilateral hand pain    Primary osteoarthritis involving multiple joints    Atrial fibrillation with RVR (H)    Hyponatremia    Atrial fibrillation with rapid ventricular response (H)    Upper back pain    Stage 3b chronic kidney disease (H)      Current Medications and Allergies:  See printed Medication Report.    Care  Coordination Start Date: 2/22/2024   Frequency of Care Coordination: monthly, more frequently as needed     Form Last Updated: 02/29/2024

## 2024-03-06 ENCOUNTER — PATIENT OUTREACH (OUTPATIENT)
Dept: CARE COORDINATION | Facility: CLINIC | Age: 89
End: 2024-03-06
Payer: COMMERCIAL

## 2024-03-20 DIAGNOSIS — I48.91 ATRIAL FIBRILLATION WITH RVR (H): ICD-10-CM

## 2024-03-20 RX ORDER — METOPROLOL TARTRATE 25 MG/1
75 TABLET, FILM COATED ORAL 2 TIMES DAILY
Qty: 600 TABLET | Refills: 2 | OUTPATIENT
Start: 2024-03-20

## 2024-03-29 ENCOUNTER — PATIENT OUTREACH (OUTPATIENT)
Dept: CASE MANAGEMENT | Facility: CLINIC | Age: 89
End: 2024-03-29
Payer: COMMERCIAL

## 2024-03-29 NOTE — PROGRESS NOTES
Clinic Care Coordination Contact  [facility]/Voicemail    Clinical Data: Care Coordinator Outreach    Outreach Documentation Number of Outreach Attempt   3/29/2024   3:38 PM 1       Left message on patient's voicemail with call back information and requested return call.    Plan: Care Coordinator will try to reach patient again in 10 business days.    Maria L Mckeon  [facility]  Clinic Care Coordinator  [facility]  [phone]  [email or web address]

## 2024-04-03 ENCOUNTER — PATIENT OUTREACH (OUTPATIENT)
Dept: CARE COORDINATION | Facility: CLINIC | Age: 89
End: 2024-04-03

## 2024-04-03 NOTE — PROGRESS NOTES
Clinic  Care Coordination Contact  Follow Up Progress Note      Assessment: Follow up call to patient . Introduction and goals reviewed.  She immediately shared she has not had any recent falls. Using her walker and moving more slowly.  Her neighbor is taking her to the grocery store.  She enjoys getting out.  Has a cleaning lady who comes every other week. . She did sign up for [employer], Has not yet used ,but knows where to go.  Her rent was recently increased. Currently she is taking $ out of savings to meet costs. . Has considered an Assisted Living. Did tour one, but very concerned by the cost.  Again discussed calling the [facility] to request a [place] Choice [place]eds [place] to see if she would qualify for any assistance /Elderly Care.Waiver. .  Kristyn seemed interested and took the phone number.  Also discussed contacting the [employer] for a Pet Food Panty.  Patient did not mention this, but it was in a note from [facility].   Patient sounded to be in good spirits. No new concerns identified at this time.     Care Gaps:    Health Maintenance Due   Topic Date Due    ASTHMA ACTION PLAN  03/03/2023    URINE DRUG SCREEN  06/27/2023    DIABETIC FOOT EXAM  10/24/2023    ZOSTER IMMUNIZATION (2 of 2) 08/11/2023         Care Plans  Care Plan: Community Resources       Problem: Insufficient In-home support       Note:     Goal Statement: Kristyn will continue working with Care Coordination to ensure her needs are met for overall health and wellbeing.  Date Goal Set: 2/28/24  Barriers: Fixed income  Strengths: Strong support system  Date to Achieve By: 2/28/25  Patient expressed understanding of goal: yes  Action steps to achieve this goal:  1. I will continue to follow up with medical team as needed  2. I will sign up for [employer] (4/24 Signed up will use)  3. I will talk to a  to get more insights into [employer]  4. I will consider signing up for MNChoices assessment 4/24 Will be calling   5. I will look into  volunteer opportunities, possibly stamping classes at [facility]   6. I will outreach to Care Coordination  for further questions or concerns          Goal: Establish adequate home support                             Intervention/Education provided during outreach: Introduction and review of goals. Provided with number for [place] Choice Needs Assessment  and [employer].      Outreach Frequency: monthly, more frequently as needed      Plan:   Patient will call [facility] and request a [place] Choice [place]eds Assessment   Care Coordinator will follow up in follow up in 1 month.       STEF Cornell / TREE Waters  [facility]   Care Coordination  [facility] Services  4/3/2024 9:47 AM

## 2024-04-14 ENCOUNTER — HOSPITAL ENCOUNTER (INPATIENT)
Facility: CLINIC | Age: 89
LOS: 8 days | Discharge: HOME HEALTH | DRG: 291 | End: 2024-04-22
Attending: EMERGENCY MEDICINE | Admitting: INTERNAL MEDICINE
Payer: COMMERCIAL

## 2024-04-14 ENCOUNTER — APPOINTMENT (OUTPATIENT)
Dept: GENERAL RADIOLOGY | Facility: CLINIC | Age: 89
DRG: 291 | End: 2024-04-14
Attending: EMERGENCY MEDICINE
Payer: COMMERCIAL

## 2024-04-14 DIAGNOSIS — J96.01 ACUTE RESPIRATORY FAILURE WITH HYPOXIA (H): ICD-10-CM

## 2024-04-14 DIAGNOSIS — I50.9 ACUTE ON CHRONIC CONGESTIVE HEART FAILURE, UNSPECIFIED HEART FAILURE TYPE (H): ICD-10-CM

## 2024-04-14 DIAGNOSIS — J44.1 COPD EXACERBATION (H): Primary | ICD-10-CM

## 2024-04-14 DIAGNOSIS — B35.4 TINEA CORPORIS: ICD-10-CM

## 2024-04-14 DIAGNOSIS — Z79.01 ANTICOAGULATED: ICD-10-CM

## 2024-04-14 DIAGNOSIS — R06.02 SOB (SHORTNESS OF BREATH): ICD-10-CM

## 2024-04-14 DIAGNOSIS — I48.91 ATRIAL FIBRILLATION WITH RVR (H): ICD-10-CM

## 2024-04-14 DIAGNOSIS — I50.30 HEART FAILURE WITH PRESERVED EJECTION FRACTION, NYHA CLASS I (H): ICD-10-CM

## 2024-04-14 PROBLEM — I34.2 NON-RHEUMATIC MITRAL VALVE STENOSIS: Status: ACTIVE | Noted: 2018-11-08

## 2024-04-14 PROBLEM — E87.20 LACTIC ACIDOSIS: Status: ACTIVE | Noted: 2024-04-14

## 2024-04-14 PROBLEM — E66.2 OBESITY HYPOVENTILATION SYNDROME (H): Status: ACTIVE | Noted: 2021-08-27

## 2024-04-14 PROBLEM — I35.0 AORTIC VALVE STENOSIS: Status: ACTIVE | Noted: 2024-04-14

## 2024-04-14 LAB
ANION GAP SERPL CALCULATED.3IONS-SCNC: 13 MMOL/L (ref 7–15)
ATRIAL RATE - MUSE: NORMAL BPM
BASE EXCESS BLDV CALC-SCNC: -2 MMOL/L (ref -3–3)
BASOPHILS # BLD AUTO: 0 10E3/UL (ref 0–0.2)
BASOPHILS NFR BLD AUTO: 0 %
BUN SERPL-MCNC: 30.6 MG/DL (ref 8–23)
CALCIUM SERPL-MCNC: 9.3 MG/DL (ref 8.2–9.6)
CHLORIDE SERPL-SCNC: 96 MMOL/L (ref 98–107)
CREAT SERPL-MCNC: 1.63 MG/DL (ref 0.51–0.95)
DEPRECATED HCO3 PLAS-SCNC: 24 MMOL/L (ref 22–29)
DIASTOLIC BLOOD PRESSURE - MUSE: NORMAL MMHG
EGFRCR SERPLBLD CKD-EPI 2021: 30 ML/MIN/1.73M2
EOSINOPHIL # BLD AUTO: 0 10E3/UL (ref 0–0.7)
EOSINOPHIL NFR BLD AUTO: 0 %
ERYTHROCYTE [DISTWIDTH] IN BLOOD BY AUTOMATED COUNT: 16.9 % (ref 10–15)
FLUAV RNA SPEC QL NAA+PROBE: NEGATIVE
FLUBV RNA RESP QL NAA+PROBE: NEGATIVE
GLUCOSE BLDC GLUCOMTR-MCNC: 219 MG/DL (ref 70–99)
GLUCOSE BLDC GLUCOMTR-MCNC: 287 MG/DL (ref 70–99)
GLUCOSE SERPL-MCNC: 321 MG/DL (ref 70–99)
HCO3 BLDV-SCNC: 23 MMOL/L (ref 21–28)
HCT VFR BLD AUTO: 44.8 % (ref 35–47)
HGB BLD-MCNC: 13.9 G/DL (ref 11.7–15.7)
IMM GRANULOCYTES # BLD: 0 10E3/UL
IMM GRANULOCYTES NFR BLD: 0 %
INTERPRETATION ECG - MUSE: NORMAL
LACTATE BLD-SCNC: 2.6 MMOL/L
LACTATE SERPL-SCNC: 1.5 MMOL/L (ref 0.7–2)
LACTATE SERPL-SCNC: 1.8 MMOL/L (ref 0.7–2)
LACTATE SERPL-SCNC: 2 MMOL/L (ref 0.7–2)
LYMPHOCYTES # BLD AUTO: 1.2 10E3/UL (ref 0.8–5.3)
LYMPHOCYTES NFR BLD AUTO: 12 %
MAGNESIUM SERPL-MCNC: 2 MG/DL (ref 1.7–2.3)
MCH RBC QN AUTO: 25.5 PG (ref 26.5–33)
MCHC RBC AUTO-ENTMCNC: 31 G/DL (ref 31.5–36.5)
MCV RBC AUTO: 82 FL (ref 78–100)
MONOCYTES # BLD AUTO: 0.7 10E3/UL (ref 0–1.3)
MONOCYTES NFR BLD AUTO: 7 %
NEUTROPHILS # BLD AUTO: 8 10E3/UL (ref 1.6–8.3)
NEUTROPHILS NFR BLD AUTO: 81 %
NRBC # BLD AUTO: 0 10E3/UL
NRBC BLD AUTO-RTO: 0 /100
NT-PROBNP SERPL-MCNC: 4002 PG/ML (ref 0–1800)
P AXIS - MUSE: NORMAL DEGREES
PCO2 BLDV: 42 MM HG (ref 40–50)
PH BLDV: 7.35 [PH] (ref 7.32–7.43)
PLATELET # BLD AUTO: 264 10E3/UL (ref 150–450)
PO2 BLDV: 37 MM HG (ref 25–47)
POTASSIUM SERPL-SCNC: 4.6 MMOL/L (ref 3.4–5.3)
PR INTERVAL - MUSE: NORMAL MS
PROCALCITONIN SERPL IA-MCNC: 0.12 NG/ML
QRS DURATION - MUSE: 96 MS
QT - MUSE: 302 MS
QTC - MUSE: 472 MS
R AXIS - MUSE: -19 DEGREES
RBC # BLD AUTO: 5.45 10E6/UL (ref 3.8–5.2)
RSV RNA SPEC NAA+PROBE: NEGATIVE
SAO2 % BLDV: 68 % (ref 70–75)
SARS-COV-2 RNA RESP QL NAA+PROBE: NEGATIVE
SODIUM SERPL-SCNC: 133 MMOL/L (ref 135–145)
SYSTOLIC BLOOD PRESSURE - MUSE: NORMAL MMHG
T AXIS - MUSE: 109 DEGREES
TROPONIN T SERPL HS-MCNC: 33 NG/L
TROPONIN T SERPL HS-MCNC: 35 NG/L
TROPONIN T SERPL HS-MCNC: 42 NG/L
TSH SERPL DL<=0.005 MIU/L-ACNC: 1.76 UIU/ML (ref 0.3–4.2)
VENTRICULAR RATE- MUSE: 147 BPM
WBC # BLD AUTO: 10 10E3/UL (ref 4–11)

## 2024-04-14 PROCEDURE — 83735 ASSAY OF MAGNESIUM: CPT | Performed by: INTERNAL MEDICINE

## 2024-04-14 PROCEDURE — 93005 ELECTROCARDIOGRAM TRACING: CPT

## 2024-04-14 PROCEDURE — 83880 ASSAY OF NATRIURETIC PEPTIDE: CPT | Performed by: EMERGENCY MEDICINE

## 2024-04-14 PROCEDURE — 258N000003 HC RX IP 258 OP 636: Performed by: EMERGENCY MEDICINE

## 2024-04-14 PROCEDURE — 71045 X-RAY EXAM CHEST 1 VIEW: CPT

## 2024-04-14 PROCEDURE — 36415 COLL VENOUS BLD VENIPUNCTURE: CPT

## 2024-04-14 PROCEDURE — 99223 1ST HOSP IP/OBS HIGH 75: CPT | Performed by: INTERNAL MEDICINE

## 2024-04-14 PROCEDURE — 210N000001 HC R&B IMCU HEART CARE

## 2024-04-14 PROCEDURE — 80048 BASIC METABOLIC PNL TOTAL CA: CPT | Performed by: EMERGENCY MEDICINE

## 2024-04-14 PROCEDURE — 84145 PROCALCITONIN (PCT): CPT | Performed by: INTERNAL MEDICINE

## 2024-04-14 PROCEDURE — 82803 BLOOD GASES ANY COMBINATION: CPT

## 2024-04-14 PROCEDURE — 96365 THER/PROPH/DIAG IV INF INIT: CPT

## 2024-04-14 PROCEDURE — 999N000157 HC STATISTIC RCP TIME EA 10 MIN

## 2024-04-14 PROCEDURE — 83605 ASSAY OF LACTIC ACID: CPT

## 2024-04-14 PROCEDURE — 84484 ASSAY OF TROPONIN QUANT: CPT | Performed by: INTERNAL MEDICINE

## 2024-04-14 PROCEDURE — 250N000011 HC RX IP 250 OP 636: Performed by: EMERGENCY MEDICINE

## 2024-04-14 PROCEDURE — 250N000009 HC RX 250: Performed by: INTERNAL MEDICINE

## 2024-04-14 PROCEDURE — 36415 COLL VENOUS BLD VENIPUNCTURE: CPT | Performed by: EMERGENCY MEDICINE

## 2024-04-14 PROCEDURE — 96376 TX/PRO/DX INJ SAME DRUG ADON: CPT

## 2024-04-14 PROCEDURE — 84443 ASSAY THYROID STIM HORMONE: CPT | Performed by: INTERNAL MEDICINE

## 2024-04-14 PROCEDURE — 84484 ASSAY OF TROPONIN QUANT: CPT | Performed by: EMERGENCY MEDICINE

## 2024-04-14 PROCEDURE — 258N000003 HC RX IP 258 OP 636: Performed by: INTERNAL MEDICINE

## 2024-04-14 PROCEDURE — 250N000009 HC RX 250: Performed by: EMERGENCY MEDICINE

## 2024-04-14 PROCEDURE — 250N000013 HC RX MED GY IP 250 OP 250 PS 637: Performed by: INTERNAL MEDICINE

## 2024-04-14 PROCEDURE — 36415 COLL VENOUS BLD VENIPUNCTURE: CPT | Performed by: INTERNAL MEDICINE

## 2024-04-14 PROCEDURE — 87637 SARSCOV2&INF A&B&RSV AMP PRB: CPT | Performed by: EMERGENCY MEDICINE

## 2024-04-14 PROCEDURE — 94660 CPAP INITIATION&MGMT: CPT

## 2024-04-14 PROCEDURE — 96375 TX/PRO/DX INJ NEW DRUG ADDON: CPT

## 2024-04-14 PROCEDURE — 99285 EMERGENCY DEPT VISIT HI MDM: CPT | Mod: 25

## 2024-04-14 PROCEDURE — 99223 1ST HOSP IP/OBS HIGH 75: CPT | Mod: 25 | Performed by: INTERNAL MEDICINE

## 2024-04-14 PROCEDURE — 85025 COMPLETE CBC W/AUTO DIFF WBC: CPT | Performed by: EMERGENCY MEDICINE

## 2024-04-14 PROCEDURE — 250N000011 HC RX IP 250 OP 636: Performed by: INTERNAL MEDICINE

## 2024-04-14 PROCEDURE — 83605 ASSAY OF LACTIC ACID: CPT | Performed by: INTERNAL MEDICINE

## 2024-04-14 RX ORDER — VITAMIN B COMPLEX
25 TABLET ORAL DAILY
Status: DISCONTINUED | OUTPATIENT
Start: 2024-04-14 | End: 2024-04-22 | Stop reason: HOSPADM

## 2024-04-14 RX ORDER — ATORVASTATIN CALCIUM 20 MG/1
20 TABLET, FILM COATED ORAL EVERY EVENING
Status: DISCONTINUED | OUTPATIENT
Start: 2024-04-14 | End: 2024-04-22 | Stop reason: HOSPADM

## 2024-04-14 RX ORDER — CEFTRIAXONE 2 G/1
2 INJECTION, POWDER, FOR SOLUTION INTRAMUSCULAR; INTRAVENOUS EVERY 24 HOURS
Status: DISCONTINUED | OUTPATIENT
Start: 2024-04-14 | End: 2024-04-18

## 2024-04-14 RX ORDER — AMOXICILLIN 250 MG
1 CAPSULE ORAL 2 TIMES DAILY PRN
Status: DISCONTINUED | OUTPATIENT
Start: 2024-04-14 | End: 2024-04-22

## 2024-04-14 RX ORDER — GLIMEPIRIDE 2 MG/1
2 TABLET ORAL
Status: DISCONTINUED | OUTPATIENT
Start: 2024-04-14 | End: 2024-04-14

## 2024-04-14 RX ORDER — PANTOPRAZOLE SODIUM 20 MG/1
20 TABLET, DELAYED RELEASE ORAL DAILY
Status: DISCONTINUED | OUTPATIENT
Start: 2024-04-14 | End: 2024-04-22 | Stop reason: HOSPADM

## 2024-04-14 RX ORDER — GLIMEPIRIDE 2 MG/1
4 TABLET ORAL
Status: DISCONTINUED | OUTPATIENT
Start: 2024-04-14 | End: 2024-04-16

## 2024-04-14 RX ORDER — DULOXETIN HYDROCHLORIDE 30 MG/1
60 CAPSULE, DELAYED RELEASE ORAL DAILY
Status: DISCONTINUED | OUTPATIENT
Start: 2024-04-14 | End: 2024-04-22 | Stop reason: HOSPADM

## 2024-04-14 RX ORDER — FEXOFENADINE HCL 180 MG/1
180 TABLET ORAL EVERY EVENING
Status: DISCONTINUED | OUTPATIENT
Start: 2024-04-14 | End: 2024-04-22 | Stop reason: HOSPADM

## 2024-04-14 RX ORDER — IPRATROPIUM BROMIDE AND ALBUTEROL SULFATE 2.5; .5 MG/3ML; MG/3ML
3 SOLUTION RESPIRATORY (INHALATION)
Status: DISCONTINUED | OUTPATIENT
Start: 2024-04-14 | End: 2024-04-15

## 2024-04-14 RX ORDER — METOPROLOL TARTRATE 1 MG/ML
5 INJECTION, SOLUTION INTRAVENOUS
Status: DISCONTINUED | OUTPATIENT
Start: 2024-04-14 | End: 2024-04-14

## 2024-04-14 RX ORDER — METHYLPREDNISOLONE SODIUM SUCCINATE 125 MG/2ML
60 INJECTION, POWDER, LYOPHILIZED, FOR SOLUTION INTRAMUSCULAR; INTRAVENOUS EVERY 12 HOURS
Status: DISCONTINUED | OUTPATIENT
Start: 2024-04-14 | End: 2024-04-15

## 2024-04-14 RX ORDER — ALBUTEROL SULFATE 90 UG/1
2 AEROSOL, METERED RESPIRATORY (INHALATION) EVERY 6 HOURS PRN
Status: DISCONTINUED | OUTPATIENT
Start: 2024-04-14 | End: 2024-04-16

## 2024-04-14 RX ORDER — DEXTROSE MONOHYDRATE 25 G/50ML
25-50 INJECTION, SOLUTION INTRAVENOUS
Status: DISCONTINUED | OUTPATIENT
Start: 2024-04-14 | End: 2024-04-15

## 2024-04-14 RX ORDER — BUPROPION HYDROCHLORIDE 100 MG/1
100 TABLET, EXTENDED RELEASE ORAL EVERY EVENING
Status: DISCONTINUED | OUTPATIENT
Start: 2024-04-14 | End: 2024-04-22 | Stop reason: HOSPADM

## 2024-04-14 RX ORDER — ACETAMINOPHEN 650 MG/1
650 SUPPOSITORY RECTAL EVERY 4 HOURS PRN
Status: DISCONTINUED | OUTPATIENT
Start: 2024-04-14 | End: 2024-04-22

## 2024-04-14 RX ORDER — FLUTICASONE FUROATE AND VILANTEROL 100; 25 UG/1; UG/1
1 POWDER RESPIRATORY (INHALATION) DAILY
Status: DISCONTINUED | OUTPATIENT
Start: 2024-04-14 | End: 2024-04-16

## 2024-04-14 RX ORDER — AMOXICILLIN 250 MG
2 CAPSULE ORAL 2 TIMES DAILY PRN
Status: DISCONTINUED | OUTPATIENT
Start: 2024-04-14 | End: 2024-04-22

## 2024-04-14 RX ORDER — TORSEMIDE 20 MG/1
40 TABLET ORAL DAILY
Status: DISCONTINUED | OUTPATIENT
Start: 2024-04-15 | End: 2024-04-22 | Stop reason: HOSPADM

## 2024-04-14 RX ORDER — DILTIAZEM HYDROCHLORIDE 5 MG/ML
10 INJECTION INTRAVENOUS ONCE
Status: COMPLETED | OUTPATIENT
Start: 2024-04-14 | End: 2024-04-14

## 2024-04-14 RX ORDER — FEXOFENADINE HCL 60 MG/1
180 TABLET, FILM COATED ORAL EVERY EVENING
Status: DISCONTINUED | OUTPATIENT
Start: 2024-04-14 | End: 2024-04-14

## 2024-04-14 RX ORDER — NICOTINE POLACRILEX 4 MG
15-30 LOZENGE BUCCAL
Status: DISCONTINUED | OUTPATIENT
Start: 2024-04-14 | End: 2024-04-15

## 2024-04-14 RX ORDER — METOPROLOL TARTRATE 1 MG/ML
5 INJECTION, SOLUTION INTRAVENOUS
Status: DISPENSED | OUTPATIENT
Start: 2024-04-14 | End: 2024-04-14

## 2024-04-14 RX ORDER — LIDOCAINE 40 MG/G
CREAM TOPICAL
Status: DISCONTINUED | OUTPATIENT
Start: 2024-04-14 | End: 2024-04-17

## 2024-04-14 RX ORDER — CALCIUM CARBONATE 500 MG/1
1000 TABLET, CHEWABLE ORAL 4 TIMES DAILY PRN
Status: DISCONTINUED | OUTPATIENT
Start: 2024-04-14 | End: 2024-04-22

## 2024-04-14 RX ORDER — AZITHROMYCIN 500 MG/1
500 INJECTION, POWDER, LYOPHILIZED, FOR SOLUTION INTRAVENOUS ONCE
Status: COMPLETED | OUTPATIENT
Start: 2024-04-14 | End: 2024-04-14

## 2024-04-14 RX ORDER — LIDOCAINE 40 MG/G
CREAM TOPICAL
Status: DISCONTINUED | OUTPATIENT
Start: 2024-04-14 | End: 2024-04-22

## 2024-04-14 RX ORDER — ACETAMINOPHEN 325 MG/1
650 TABLET ORAL EVERY 4 HOURS PRN
Status: DISCONTINUED | OUTPATIENT
Start: 2024-04-14 | End: 2024-04-22

## 2024-04-14 RX ORDER — FUROSEMIDE 10 MG/ML
40 INJECTION INTRAMUSCULAR; INTRAVENOUS ONCE
Status: COMPLETED | OUTPATIENT
Start: 2024-04-14 | End: 2024-04-14

## 2024-04-14 RX ORDER — FUROSEMIDE 10 MG/ML
40 INJECTION INTRAMUSCULAR; INTRAVENOUS EVERY 8 HOURS
Status: DISCONTINUED | OUTPATIENT
Start: 2024-04-14 | End: 2024-04-14

## 2024-04-14 RX ADMIN — DILTIAZEM HYDROCHLORIDE 10 MG: 5 INJECTION INTRAVENOUS at 12:40

## 2024-04-14 RX ADMIN — ATORVASTATIN CALCIUM 20 MG: 20 TABLET, FILM COATED ORAL at 21:28

## 2024-04-14 RX ADMIN — METOPROLOL TARTRATE 5 MG: 5 INJECTION INTRAVENOUS at 14:53

## 2024-04-14 RX ADMIN — FUROSEMIDE 40 MG: 10 INJECTION, SOLUTION INTRAMUSCULAR; INTRAVENOUS at 13:51

## 2024-04-14 RX ADMIN — FEXOFENADINE HCL 180 MG: 180 TABLET ORAL at 21:28

## 2024-04-14 RX ADMIN — BUPROPION HYDROCHLORIDE 100 MG: 100 TABLET, FILM COATED, EXTENDED RELEASE ORAL at 21:28

## 2024-04-14 RX ADMIN — AZITHROMYCIN MONOHYDRATE 500 MG: 500 INJECTION, POWDER, LYOPHILIZED, FOR SOLUTION INTRAVENOUS at 22:12

## 2024-04-14 RX ADMIN — APIXABAN 2.5 MG: 2.5 TABLET, FILM COATED ORAL at 21:28

## 2024-04-14 RX ADMIN — CEFTRIAXONE SODIUM 2 G: 2 INJECTION, POWDER, FOR SOLUTION INTRAMUSCULAR; INTRAVENOUS at 15:43

## 2024-04-14 RX ADMIN — DILTIAZEM HYDROCHLORIDE 5 MG/HR: 5 INJECTION INTRAVENOUS at 13:11

## 2024-04-14 RX ADMIN — METOPROLOL TARTRATE 75 MG: 50 TABLET, FILM COATED ORAL at 21:28

## 2024-04-14 RX ADMIN — FUROSEMIDE 40 MG: 10 INJECTION, SOLUTION INTRAMUSCULAR; INTRAVENOUS at 21:27

## 2024-04-14 RX ADMIN — IPRATROPIUM BROMIDE AND ALBUTEROL SULFATE 3 ML: .5; 3 SOLUTION RESPIRATORY (INHALATION) at 15:44

## 2024-04-14 RX ADMIN — DILTIAZEM HYDROCHLORIDE 15 MG/HR: 5 INJECTION INTRAVENOUS at 14:59

## 2024-04-14 RX ADMIN — DILTIAZEM HYDROCHLORIDE 15 MG/HR: 5 INJECTION INTRAVENOUS at 23:17

## 2024-04-14 RX ADMIN — METHYLPREDNISOLONE SODIUM SUCCINATE 62.5 MG: 125 INJECTION, POWDER, FOR SOLUTION INTRAMUSCULAR; INTRAVENOUS at 15:41

## 2024-04-14 ASSESSMENT — COLUMBIA-SUICIDE SEVERITY RATING SCALE - C-SSRS
6. HAVE YOU EVER DONE ANYTHING, STARTED TO DO ANYTHING, OR PREPARED TO DO ANYTHING TO END YOUR LIFE?: NO
2. HAVE YOU ACTUALLY HAD ANY THOUGHTS OF KILLING YOURSELF IN THE PAST MONTH?: NO
1. IN THE PAST MONTH, HAVE YOU WISHED YOU WERE DEAD OR WISHED YOU COULD GO TO SLEEP AND NOT WAKE UP?: NO

## 2024-04-14 ASSESSMENT — ACTIVITIES OF DAILY LIVING (ADL)
ADLS_ACUITY_SCORE: 40
ADLS_ACUITY_SCORE: 33
ADLS_ACUITY_SCORE: 40
ADLS_ACUITY_SCORE: 33
ADLS_ACUITY_SCORE: 33
ADLS_ACUITY_SCORE: 40
ADLS_ACUITY_SCORE: 32
ADLS_ACUITY_SCORE: 32
ADLS_ACUITY_SCORE: 40
ADLS_ACUITY_SCORE: 40
ADLS_ACUITY_SCORE: 32

## 2024-04-14 NOTE — ED TRIAGE NOTES
EMS report: comes from home, several days of cough and SOB. SpO2 on room air in mid 80s. [place], SOB. EMS gave 2 duonebs and supplemental O2. Patient reports is supposed to wear O2 at home but did not tolerate.     Triage Assessment (Adult)       Row Name 04/14/24 1230          Triage Assessment    Airway WDL WDL        Respiratory WDL    Respiratory WDL X;rhythm/pattern     Rhythm/Pattern, Respiratory dyspnea upon exertion;shortness of breath;labored;tachypneic        Skin Circulation/Temperature WDL    Skin Circulation/Temperature WDL WDL        Cardiac WDL    Cardiac WDL X;rhythm     Pulse Rate & Regularity apical pulse irregular;tachycardic     Cardiac Rhythm Atrial fibrillation        Peripheral/Neurovascular WDL    Peripheral Neurovascular WDL WDL        Cognitive/Neuro/Behavioral WDL    Cognitive/Neuro/Behavioral WDL WDL

## 2024-04-14 NOTE — H&P
"[facility]    History and Physical - Hospitalist Service       Date of Admission:  4/14/2024    Assessment & Plan      Moira Andre is a 89 year old female admitted on 4/14/2024. She has h/o HFpEF, Afib (eliquis), CAD (Stents 2007), DMII (glimepiride), ELIGIO, [place], Ventral hernia, COPD/Asthma,     She presents with complaints of escalating wheezing/HENDERSON and cough .   Past 3 days or so.   SHe came from her apartment where she lives independently with her cat.   EMS noted O2sats in 80's, gave her duenebs X 2 on way to ER.  In ER, , started diltiazem gtt.   BP stable.       She presented similarly in 12/2023 -- but this on this presentation,  she also has a cough and wheezing.   Denies med non compliance.  No chest pain.  No increasing edema.  No GI complaints.         Afib RVR   (During 11/2023 admission, metoprolol increased to 75 bid)  Anticoagulated  HFpEF, decompensated  BNP 4000   Lactic Acidosis   2.7 > 2.0 ...  Elevated troponin (35 -  this is \"baseline\" per review of chart)  Aortic Stenosis, moderate    Mitral Valve Stenosis, mild, with severe calcification    CAD (OM1 and diagonal stents 2007, angiograms 2013, 2017 showed patency)  - on diltiazem gtt @ 10-15  - IV metoprolol 5 mg X 3  - will give one additional dose of IV lasix  40 mg  tonight (had one dose in ED  - continue pta torsemide bid        - strict I&O  - Cardiology consult  - Recheck Echo (last done 12/23, EF 55%, moderate Aortic stenosis, mild-mod MV stenosis)  - recheck Lactic acid  - serial troponin  - tele  - continue pta metoprolol, eliquis, atorvastatin    Outpatient cardiology visits have been cancelled or \"no show\" since about 2022 per chart review.   .        COPD/Asthma  Wheezing  Cough     - Covid/Influenza/RSV screening (-)  - 4/14/24 portable CXR is poor quality, likely shows CHF.     - procalcitonin 0.12  - due to new cough and hypoxia, will empirically start Abx (ceftriaxone/azithromycin) for possible CAP " "contributing  - scheduled duonebs -- re assess daily and change to prn when appropriate.   - solumedrol IV  (hold pta Wixela inhaler during steroid therapy)  - assess for tapering daily    - of note, she was discharged 12/2023 with supplemental O2.  She states it \"burned her nose\" so she never really used it.  The tank was too heavy to be used with ambulation  Called oxygen company to cancel \"They never picked it up, still there\".     >> should re assess Supplemental oxygen needs prior to discharge, once acute decompensated CHF and wheezing/URI  have improved.     CKD3b  Creatinine is 1.63 - at her baseline.   Follow BMP    Nephrolithiasis  - per 11/2023 CT, she has 1 cm stone in R renal pelvis, nonobstructive  - no  complaints  - check UA if  complaints arise.  -  No elective intervention planned (decided per her PCP and urology), due to multiple co morbidities.          DMII  A1C = 7.7 2/22/2024  Hyperglycemia  - 300's at presentation.... starting steroid therapy     - pta glimepiride - will increase her dose from 2mg bid to 4 mg bid while on solumedrol:  re evaluate and consider if ongoing dose change appropriate or to resume her pta dose...   - high intensity ISS (anticipate elevated sugars and resistance with steroid treatment)    Large ventral hernia  - affects her mobility and accessory muscle use  - not surgical candidate  Bilateral Knee replacements   - affects mobility     ELIGIO  Her cpap machine is broken, has appt to fix  Will see if RT can help resume her CPAP while here when appropriate.     PT/OT/CC consults to help with dispo planning.                Diet: NPO for Medical/Clinical Reasons Except for: Meds, Ice Chips    DVT Prophylaxis: DOAC  Charles Catheter: Not present  Lines: None     Cardiac Monitoring: ACTIVE order. Indication: Tachyarrhythmias, acute (48 hours)  Code Status: Full Code      Clinically Significant Risk Factors Present on Admission               # Drug Induced Coagulation Defect: " "home medication list includes an anticoagulant medication    # Hypertension: Noted on problem list  # Acute heart failure with preserved ejection fraction: heart failure noted on problem list, last echo with EF >50%, and receiving IV diuretics    # DMII: A1C = 7.7 % (Ref range: 0.0 - 5.6 %) within past 6 months    # Severe Obesity: Estimated body mass index is 41.5 kg/m  as calculated from the following:    Height as of this encounter: 1.702 m (5' 7\").    Weight as of this encounter: 120.2 kg (265 lb).       # Financial/Environmental Concerns:    # Asthma: noted on problem list        Disposition Plan     Medically Ready for Discharge: Anticipated in 2-4 Days  HR controlled.   Cardiology consult.  Wheezing controlled.            Hortencia Ohara MD  Hospitalist Service  [facility]  Securely message with Immediately (more info)  Text page via [facility] Paging/Directory     ______________________________________________________________________    Chief Complaint   SOB and cough, escalating past 3d.     History is obtained from the patient and ER provider    History of Present Illness   Moira Andre is a 89 year old female who  escalating wheezing/HENDERSON and cough .   Past 3 days or so.   SHe came from her apartment where she lives independently with her cat.   EMS noted O2sats in 80's, gave her duenebs X 2 on way to ER.  In ER, , started diltiazem gtt.   BP stable.       She presented similarily in 12/2023 -- but this time she has a cough and wheezing.   Denies med non compliance.  No chest pain.  No increasing edema.  No GI complaints.         Past Medical History    Past Medical History:   Diagnosis Date    Aortic valve sclerosis     heart murmur, no AS    Arrhythmia     PAT, PVC    Aspirin allergy     Plavix use long term    Asthma     CKD (chronic kidney disease) stage 3, GFR 30-59 ml/min (H)     x 2007 atleast    Congestive heart failure, unspecified     Depression     Diabetes mellitus " (H) 2010    Diastolic dysfunction, left ventricle 2013    grade 2, nl ef    HTN (hypertension)     Lactic acidosis 08/2018    due to dehydration and metformin    Migraine headache     Mitral stenosis     mild, 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    Myocardial infarction (H) 9/2007, cath 2013 ml    BMS: stent to OM, diag, nl EF, echo /C angia 2013 , f/u cath no lesion >40%    Nephrolithiasis     right side    OA (osteoarthritis) of knee     Obesity     Rheumatoid arthritis flare (H)     prednisone    Sleep apnea     restarted using cpap 2017    TIA (transient ischaemic attack)     Ventral hernia, unspecified, without mention of obstruction or gangrene        Past Surgical History   Past Surgical History:   Procedure Laterality Date    APPENDECTOMY      BIOPSY BREAST      x2 -needle & lumpectomy-benign    CHOLECYSTECTOMY      CORONARY ANGIOGRAPHY ADULT ORDER  9/28/2007    Bare metal stent to OM1, Diagonal patent     CORONARY ANGIOGRAPHY ADULT ORDER  9/25/2007    Thurmond stent to Diagonal    HC LEFT HEART CATHETERIZATION  8/2013    Moderate CAD    HYSTERECTOMY TOTAL ABDOMINAL      ORTHOPEDIC SURGERY      knee replacement on right side (2006), Left side (2016)    RELEASE CARPAL TUNNEL      right and left    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 Taking?   DULoxetine (CYMBALTA) 60 MG capsule   No No   Sig: Take 1 capsule (60 mg) by mouth daily   Menthol (Topical Analgesic) 7.5 % (Roll) MISC  Self Yes No   Sig: Apply to affected area every morning   Multiple Vitamins-Minerals (HAIR SKIN AND NAILS FORMULA PO)  Self Yes No   Sig: Take 1 tablet by mouth daily   Vitamin D3 (CHOLECALCIFEROL) 25 mcg (1000 units) tablet  Self Yes No   Sig: Take 25 mcg by mouth daily   acetaminophen (TYLENOL) 500 MG tablet  Self Yes No   Sig: Take 1,000 mg by mouth 2 times daily   albuterol (PROAIR HFA/PROVENTIL HFA/VENTOLIN  HFA) 108 (90 Base) MCG/ACT inhaler  Self Yes No   Sig: Inhale 2 puffs into the lungs every 6 hours as needed for shortness of breath, wheezing or cough For shortness of breath   apixaban ANTICOAGULANT (ELIQUIS) 2.5 MG tablet   No No   Sig: Take 1 tablet (2.5 mg) by mouth 2 times daily   atorvastatin (LIPITOR) 20 MG tablet   No No   Sig: Take 1 tablet (20 mg) by mouth daily for cholesterol   buPROPion (WELLBUTRIN SR) 100 MG 12 hr tablet   No No   Sig: Take 1 tablet (100 mg) by mouth daily for mood   calcium carbonate (TUMS) 500 MG chewable tablet  Self Yes No   Sig: Take 1 chew tab by mouth 2 times daily as needed for heartburn   camphor-menthol-methyl salicylate 3.1-6-10 % PTCH  Self No No   Sig: Apply 1 patch topically as needed (hand pain) Or back pain   fexofenadine (ALLEGRA) 180 MG tablet  Self Yes No   Sig: Take 180 mg by mouth every evening   fluticasone (FLONASE) 50 MCG/ACT nasal spray  Self Yes No   Sig: Spray 1 spray into both nostrils daily as needed    fluticasone-salmeterol (WIXELA INHUB) 100-50 MCG/ACT inhaler  Self No No   Sig: USE 1 INHALATION BY MOUTH EVERY  12 HOURS   Patient taking differently: Inhale 1 puff into the lungs daily   glimepiride (AMARYL) 2 MG tablet  Self No No   Sig: Take 1 tablet (2 mg) by mouth 2 times daily (before meals)   metoprolol tartrate (LOPRESSOR) 25 MG tablet   No No   Sig: Take 3 tablets (75 mg) by mouth 2 times daily   nitroGLYcerin (NITROSTAT) 0.4 MG sublingual tablet  Self No No   Sig: FOR CHEST PAIN PLACE 1 TABLET  UNDER TONGUE EVERY 5 MINUTES FOR 3 DOSES. IF SYMPTOMS PERSIST 5  MINUTES AFTER 1ST DOSE CALL 911   pantoprazole (PROTONIX) 20 MG EC tablet   No No   Sig: Take 1 tablet (20 mg) by mouth daily   torsemide (DEMADEX) 20 MG tablet   No No   Sig: Take 2 tablets (40 mg) by mouth daily      Facility-Administered Medications: None      ------------------------------------------------------------------------     Physical Exam   Vital Signs: Temp: 98.9  F (37.2  C)  Temp src: Temporal BP: 107/87 Pulse: 111   Resp: 20 SpO2: 94 % O2 Device: Nasal cannula Oxygen Delivery: 3 LPM  Weight: 265 lbs 0 oz    Constitutional: Obese. awake, alert, cooperative, no apparent distress, and appears stated age  Eyes: Lids and lashes normal, pupils equal, round and reactive to light, extra ocular muscles intact, sclera clear, conjunctiva normal  Respiratory:  diffuse wheezing and prolonged exp phase.   Kyphosis/scoliosis limit her respiratory cycles.  Non productive wheezy cough during exam.   Speaking 5-7 word sentences w/o distress.    Cardiovascular: tachycardia, 140's   GI: very large ventral hernia.   Normal BS.  No pain to palpation.  Soft.    Skin: thin skin.  Some senile purpura  Musculoskeletal: bilateral knee replacement scars.   2+/4 edema of lower legs and feet bilaterally   Neuropsychiatric: General: normal, calm, and normal eye contact  Affect: normal and pleasant  Memory and insight: normal, memory for past and recent events intact, and thought process normal    Medical Decision Making       75 MINUTES SPENT BY ME on the date of service doing chart review, history, exam, documentation & further activities per the note.      Data     I have personally reviewed the following data over the past 24 hrs:    10.0  \   13.9   / 264     133 (L) 96 (L) 30.6 (H) /  321 (H)   4.6 24 1.63 (H) \     Trop: 35 (H) BNP: 4,002 (H)     Procal: 0.12 CRP: N/A Lactic Acid: 2.0         Imaging results reviewed over the past 24 hrs:   Recent Results (from the past 24 hour(s))   XR Chest Port 1 View    Narrative    EXAM: XR CHEST PORT 1 VIEW  LOCATION: [facility]  DATE: 4/14/2024    INDICATION: SHORTNESS OF BREATH  COMPARISON: 12/27/2023 and 11/25/2023      Impression    IMPRESSION: Markedly shallow rotation and mild patient rotation to the right. Mild mid and lower lung interstitial edema. No focal consolidation. Minimal pleural fluid and/or thickening inferior hemithoraces.  Mild generalized cardiac enlargement and   borderline pulmonary venous congestion.

## 2024-04-14 NOTE — PHARMACY-ADMISSION MEDICATION HISTORY
"Pharmacist Admission Medication History    Admission medication history is complete. The information provided in this note is only as accurate as the sources available at the time of the update.    Information Source(s): Patient and CareEverywhere/SureScripts via in-person    Pertinent Information: patient missed a few doses of most scheduled medications due to symptom onset.     Changes made to PTA medication list:  Added: None  Deleted: flonase PRN --> patient states not taking  Changed: icy hot patch -> topical spray, wixela daily --> BID, menthol roll on AM--> PM dosing, torsemide 40 mg daily --> 20 mg BID (patient \"I think I split them [doses] up\")     Allergies reviewed with patient and updates made in EHR: no    Medication History Completed By: Maria L Estrada [facility] 4/14/2024 3:58 PM    PTA Med List   Medication Sig Last Dose    acetaminophen (TYLENOL) 500 MG tablet Take 1,000 mg by mouth 2 times daily Past Week at unknown time    albuterol (PROAIR HFA/PROVENTIL HFA/VENTOLIN HFA) 108 (90 Base) MCG/ACT inhaler Inhale 2 puffs into the lungs every 6 hours as needed for shortness of breath, wheezing or cough For shortness of breath 4/14/2024 at AM    apixaban ANTICOAGULANT (ELIQUIS) 2.5 MG tablet Take 1 tablet (2.5 mg) by mouth 2 times daily 4/12/2024 at PM    atorvastatin (LIPITOR) 20 MG tablet Take 1 tablet (20 mg) by mouth daily for cholesterol 4/12/2024 at PM    buPROPion (WELLBUTRIN SR) 100 MG 12 hr tablet Take 1 tablet (100 mg) by mouth daily for mood 4/12/2024 at PM    calcium carbonate (TUMS) 500 MG chewable tablet Take 1 chew tab by mouth 2 times daily as needed for heartburn Unknown at PRN, takes 1-2 doses per week at baseline    DULoxetine (CYMBALTA) 60 MG capsule Take 1 capsule (60 mg) by mouth daily 4/12/2024 at PM    fexofenadine (ALLEGRA) 180 MG tablet Take 180 mg by mouth every evening 4/12/2024 at PM    fluticasone-salmeterol (WIXELA INHUB) 100-50 MCG/ACT inhaler USE 1 INHALATION BY MOUTH EVERY  " 12 HOURS 4/13/2024 at unknown time    glimepiride (AMARYL) 2 MG tablet Take 1 tablet (2 mg) by mouth 2 times daily (before meals) 4/12/2024 at PM    Menthol (Topical Analgesic) 7.5 % (Roll) MISC Apply topically every evening Apply to affected area 4/12/2024 at PM    Menthol, Topical Analgesic, (ICY HOT MEDICATED SPRAY EX) Externally apply topically daily as needed (pain, in neck, back, hand) Unknown at PRN    metoprolol tartrate (LOPRESSOR) 25 MG tablet Take 3 tablets (75 mg) by mouth 2 times daily 4/12/2024 at PM    Multiple Vitamins-Minerals (HAIR SKIN AND NAILS FORMULA PO) Take 1 tablet by mouth daily Past Week at PM    nitroGLYcerin (NITROSTAT) 0.4 MG sublingual tablet FOR CHEST PAIN PLACE 1 TABLET  UNDER TONGUE EVERY 5 MINUTES FOR 3 DOSES. IF SYMPTOMS PERSIST 5  MINUTES AFTER 1ST DOSE CALL 911 Unknown at PRN, has home supply    pantoprazole (PROTONIX) 20 MG EC tablet Take 1 tablet (20 mg) by mouth daily 4/12/2024 at AM    torsemide (DEMADEX) 20 MG tablet Take 2 tablets (40 mg) by mouth daily (Patient taking differently: Take 20 mg by mouth 2 times daily) 4/12/2024 at PM    Vitamin D3 (CHOLECALCIFEROL) 25 mcg (1000 units) tablet Take 25 mcg by mouth daily 4/12/2024 at PM

## 2024-04-14 NOTE — ED NOTES
Assumed care for patient. Patient had low blood pressure of 80/65. Dilt drip stopped.     Patient had an increased work of breathing. Resp rate 34 and audible wheezing heard. Administered Duo neb and Solumedrol. Admitting MD, Dr. Ohara updated via [employer] messaging. Respiratory called and patient placed on Bipap for increased work of breathing

## 2024-04-14 NOTE — CONSULTS
[facility]    Cardiology Consultation    Date of Admission:  4/14/2024  Date of Service: 4/14/2024    Reason for Consult   Reason for consult: I was asked by Dr. Ohara to evaluate this patient for atrial fibrillation.    History of Present Illness   Moira Andre is a 89 year old female who presents with dyspnea/cough and found to be in atrial fibrillation with rapid ventricular response in the setting of likely COPD exacerbation. Her medical comorbidities include: atrial fibrillation, moderate aortic valve stenosis, mild-moderate mitral valve stenosis, HFpEF, coronary artery disease s/p PCI in 2007, type 2 diabetes mellitus, CKD stage 3b, ELIGIO, COPD, asthma. She reports not using her prescribed oxygen at home. She denies any significant weight gain.    I note she was hospitalized in December also with Afib with RVR in the setting of HFpEF exacerbation. Discharge weight at that time (after diuresis) was 256 lbs. She has not yet been weighed here. Of note, she has not shown up for her outpatient cardiology visits in the last couple of years, despite regular scheduling after hospitalizations.     Assessment & Plan   Moira Andre is a 89 year old female who was admitted on 4/14/2024 in atrial fibrillation with RVR.     Atrial fibrillation with RVR  Moderate aortic valve stenosis  Mild-moderate mitral valve stenosis  HFpEF  Coronary artery disease s/p PCI in 2007  Type 2 diabetes mellitus  CKD stage 3b  ELIGIO  COPD/asthma    Plan:  Continue IV diltiazem for now  Could consider discussion with EP tomorrow about the possibility of starting amiodarone to try and prevent recurrent admissions for Afib with RVR  Echocardiogram pending  Continue PTA apixaban 2.5 mg BID, atorvastatin 20 mg daily, metoprolol tartrate 75 mg BID, torsemide 40 mg daily  Daily standing weight, strict Is/Os, daily BMP    Inocente Thayer MD    Primary Care Physician   Maryan Churchill    Patient Active Problem  List   Diagnosis    CKD (chronic kidney disease) stage 3, GFR 30-59 ml/min (H)    Morbid obesity (H)    Coronary artery disease involving native coronary artery of native heart with angina pectoris (H24)    Type 2 diabetes mellitus with diabetic nephropathy (H)    Transient cerebral ischemia    Hyperlipidemia LDL goal <70    Ventral hernia    Intermittent asthma    Moderate major depression (H)    ELIGIO on CPAP    Microalbuminuria    S/P total knee arthroplasty    OA (osteoarthritis) of knee    Anemia due to blood loss, acute    Health Care Home    Essential hypertension    Gastroesophageal reflux disease without esophagitis    Bilateral edema of lower extremity    Osteoarthritis    High risk medication use    Anxiety    Other chronic pain    Controlled substance agreement signed    Edema of lower extremity    Nephrolithiasis    Kidney stone    Tremor    Atypical chest pain    ACS (acute coronary syndrome) (H)    Non-rheumatic mitral valve stenosis    Coronary artery calcification seen on CAT scan    Lumbar radiculopathy    Back muscle spasm    Obesity hypoventilation syndrome (H)    CPAP/BiPAP dependent    Spinal stenosis of lumbosacral region    Bilateral hand pain    Primary osteoarthritis involving multiple joints    Atrial fibrillation with RVR (H)    Acute respiratory failure with hypoxia (H)    Hyponatremia    Atrial fibrillation with rapid ventricular response (H)    Upper back pain    Stage 3b chronic kidney disease (H)    SOB (shortness of breath)    Acute on chronic congestive heart failure, unspecified heart failure type (H)    Lactic acidosis    Aortic valve stenosis    Anticoagulated       Past Medical History   I have reviewed this patient's medical history and updated it with pertinent information if needed.   Past Medical History:   Diagnosis Date    Aortic valve sclerosis     heart murmur, no AS    Arrhythmia     PAT, PVC    Aspirin allergy     Plavix use long term    Asthma     CKD (chronic kidney  disease) stage 3, GFR 30-59 ml/min (H)     x 2007 atleast    Congestive heart failure, unspecified     Depression     Diabetes mellitus (H) 2010    Diastolic dysfunction, left ventricle 2013    grade 2, nl ef    HTN (hypertension)     Lactic acidosis 08/2018    due to dehydration and metformin    Migraine headache     Mitral stenosis     mild, 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    Myocardial infarction (H) 9/2007, cath 2013 ml    BMS: stent to OM, diag, nl EF, echo /C angia 2013 , f/u cath no lesion >40%    Nephrolithiasis     right side    OA (osteoarthritis) of knee     Obesity     Rheumatoid arthritis flare (H)     prednisone    Sleep apnea     restarted using cpap 2017    TIA (transient ischaemic attack)     Ventral hernia, unspecified, without mention of obstruction or gangrene        Past Surgical History   I have reviewed this patient's surgical history and updated it with pertinent information if needed.  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 Taking?   DULoxetine (CYMBALTA) 60 MG capsule   No No   Sig: Take 1 capsule (60 mg) by mouth daily   Menthol (Topical Analgesic) 7.5 % (Roll) MISC  Self Yes No   Sig: Apply to affected area every morning   Multiple Vitamins-Minerals (HAIR SKIN AND NAILS FORMULA PO)  Self Yes No   Sig: Take 1 tablet by mouth daily   Vitamin D3  (CHOLECALCIFEROL) 25 mcg (1000 units) tablet  Self Yes No   Sig: Take 25 mcg by mouth daily   acetaminophen (TYLENOL) 500 MG tablet  Self Yes No   Sig: Take 1,000 mg by mouth 2 times daily   albuterol (PROAIR HFA/PROVENTIL HFA/VENTOLIN HFA) 108 (90 Base) MCG/ACT inhaler  Self Yes No   Sig: Inhale 2 puffs into the lungs every 6 hours as needed for shortness of breath, wheezing or cough For shortness of breath   apixaban ANTICOAGULANT (ELIQUIS) 2.5 MG tablet   No No   Sig: Take 1 tablet (2.5 mg) by mouth 2 times daily   atorvastatin (LIPITOR) 20 MG tablet   No No   Sig: Take 1 tablet (20 mg) by mouth daily for cholesterol   buPROPion (WELLBUTRIN SR) 100 MG 12 hr tablet   No No   Sig: Take 1 tablet (100 mg) by mouth daily for mood   calcium carbonate (TUMS) 500 MG chewable tablet  Self Yes No   Sig: Take 1 chew tab by mouth 2 times daily as needed for heartburn   camphor-menthol-methyl salicylate 3.1-6-10 % PTCH  Self No No   Sig: Apply 1 patch topically as needed (hand pain) Or back pain   fexofenadine (ALLEGRA) 180 MG tablet  Self Yes No   Sig: Take 180 mg by mouth every evening   fluticasone (FLONASE) 50 MCG/ACT nasal spray  Self Yes No   Sig: Spray 1 spray into both nostrils daily as needed    fluticasone-salmeterol (WIXELA INHUB) 100-50 MCG/ACT inhaler  Self No No   Sig: USE 1 INHALATION BY MOUTH EVERY  12 HOURS   Patient taking differently: Inhale 1 puff into the lungs daily   glimepiride (AMARYL) 2 MG tablet  Self No No   Sig: Take 1 tablet (2 mg) by mouth 2 times daily (before meals)   metoprolol tartrate (LOPRESSOR) 25 MG tablet   No No   Sig: Take 3 tablets (75 mg) by mouth 2 times daily   nitroGLYcerin (NITROSTAT) 0.4 MG sublingual tablet  Self No No   Sig: FOR CHEST PAIN PLACE 1 TABLET  UNDER TONGUE EVERY 5 MINUTES FOR 3 DOSES. IF SYMPTOMS PERSIST 5  MINUTES AFTER 1ST DOSE CALL 911   pantoprazole (PROTONIX) 20 MG EC tablet   No No   Sig: Take 1 tablet (20 mg) by mouth daily   torsemide (DEMADEX) 20 MG  tablet   No No   Sig: Take 2 tablets (40 mg) by mouth daily      Facility-Administered Medications: None     Current Facility-Administered Medications   Medication Dose Route Frequency Provider Last Rate Last Admin    acetaminophen (TYLENOL) tablet 650 mg  650 mg Oral Q4H PRN Hortencia Ohara MD        Or    acetaminophen (TYLENOL) Suppository 650 mg  650 mg Rectal Q4H PRN Hortencia Ohara MD        [START ON 4/15/2024] azithromycin (ZITHROMAX) 250 mg in sodium chloride 0.9 % 250 mL intermittent infusion  250 mg Intravenous Q24H Hortencia Ohara MD        azithromycin (ZITHROMAX) 500 mg vial to attach to  mL bag  500 mg Intravenous Once Hortencia Ohara MD        calcium carbonate (TUMS) chewable tablet 1,000 mg  1,000 mg Oral 4x Daily PRN Hortencia Ohara MD        cefTRIAXone (ROCEPHIN) 2 g vial to attach to  ml bag for ADULTS or NS 50 ml bag for PEDS  2 g Intravenous Q24H Hortencia Ohara MD        glucose gel 15-30 g  15-30 g Oral Q15 Min PRN Hortencia Ohara MD        Or    dextrose 50 % injection 25-50 mL  25-50 mL Intravenous Q15 Min PRHortencia Beckman MD        Or    glucagon injection 1 mg  1 mg Subcutaneous Q15 Min PRN Hortencia Ohara MD        diltiazem (CARDIZEM) 125 mg in sodium chloride 0.9 % 125 mL infusion  5-15 mg/hr Intravenous Continuous Hortencia Ohara MD   Stopped at 04/14/24 1521    furosemide (LASIX) injection 40 mg  40 mg Intravenous Q8H Hortencia Ohara MD        insulin aspart (NovoLOG) injection (RAPID ACTING)  1-10 Units Subcutaneous TID AC Hortencia Ohara MD        insulin aspart (NovoLOG) injection (RAPID ACTING)  1-7 Units Subcutaneous At Bedtime Hortencia Ohara MD        ipratropium - albuterol 0.5 mg/2.5 mg/3 mL (DUONEB) neb solution 3 mL  3 mL Nebulization Q4H WA Hortencia Ohara MD        lidocaine (LMX4) cream   Topical Q1H PRN Hortencia Ohara MD        lidocaine (LMX4) cream   Topical Q1H PRN  Hortencia Ohara MD        lidocaine 1 % 0.1-1 mL  0.1-1 mL Other Q1H PRN Hortencia Ohara MD        lidocaine 1 % 0.1-1 mL  0.1-1 mL Other Q1H PRHortencia Shay MD        melatonin tablet 1 mg  1 mg Oral At Bedtime PRN Hortencia Ohara MD        methylPREDNISolone sodium succinate (solu-MEDROL) injection 62.5 mg  62.5 mg Intravenous Q12H Hortencia Ohara MD        No anticoagulants IF patient has had acute trauma/surgery or recent intracranial, GI or urinary tract bleeding.    Other DOES NOT GO TO Hortencia Painting MD        Patient is already receiving anticoagulation with heparin, enoxaparin (LOVENOX), warfarin (COUMADIN)  or other anticoagulant medication   Does not apply Continuous PRN Hortencia Ohara MD        senna-docusate (SENOKOT-S/PERICOLACE) 8.6-50 MG per tablet 1 tablet  1 tablet Oral BID PRHortencia Shay MD        Or    senna-docusate (SENOKOT-S/PERICOLACE) 8.6-50 MG per tablet 2 tablet  2 tablet Oral BID PRN Hortencia Ohara MD        sodium chloride (PF) 0.9% PF flush 3 mL  3 mL Intracatheter Q8H Hortencia Ohara MD        sodium chloride (PF) 0.9% PF flush 3 mL  3 mL Intracatheter q1 min prHortencia Shay MD        sodium chloride (PF) 0.9% PF flush 3 mL  3 mL Intracatheter Q8H Hortencia Ohara MD        sodium chloride (PF) 0.9% PF flush 3 mL  3 mL Intracatheter q1 min prHortencia Shay MD         Current Outpatient Medications   Medication Sig Dispense Refill    acetaminophen (TYLENOL) 500 MG tablet Take 1,000 mg by mouth 2 times daily      albuterol (PROAIR HFA/PROVENTIL HFA/VENTOLIN HFA) 108 (90 Base) MCG/ACT inhaler Inhale 2 puffs into the lungs every 6 hours as needed for shortness of breath, wheezing or cough For shortness of breath      apixaban ANTICOAGULANT (ELIQUIS) 2.5 MG tablet Take 1 tablet (2.5 mg) by mouth 2 times daily 180 tablet 3    atorvastatin (LIPITOR) 20 MG tablet Take 1 tablet (20 mg) by mouth daily for  cholesterol 100 tablet 3    buPROPion (WELLBUTRIN SR) 100 MG 12 hr tablet Take 1 tablet (100 mg) by mouth daily for mood 90 tablet 3    calcium carbonate (TUMS) 500 MG chewable tablet Take 1 chew tab by mouth 2 times daily as needed for heartburn      camphor-menthol-methyl salicylate 3.1-6-10 % PTCH Apply 1 patch topically as needed (hand pain) Or back pain      DULoxetine (CYMBALTA) 60 MG capsule Take 1 capsule (60 mg) by mouth daily 90 capsule 3    fexofenadine (ALLEGRA) 180 MG tablet Take 180 mg by mouth every evening      fluticasone (FLONASE) 50 MCG/ACT nasal spray Spray 1 spray into both nostrils daily as needed       fluticasone-salmeterol (WIXELA INHUB) 100-50 MCG/ACT inhaler USE 1 INHALATION BY MOUTH EVERY  12 HOURS (Patient taking differently: Inhale 1 puff into the lungs daily) 180 each 3    glimepiride (AMARYL) 2 MG tablet Take 1 tablet (2 mg) by mouth 2 times daily (before meals) 200 tablet 2    Menthol (Topical Analgesic) 7.5 % (Roll) MISC Apply to affected area every morning      metoprolol tartrate (LOPRESSOR) 25 MG tablet Take 3 tablets (75 mg) by mouth 2 times daily 540 tablet 1    Multiple Vitamins-Minerals (HAIR SKIN AND NAILS FORMULA PO) Take 1 tablet by mouth daily      nitroGLYcerin (NITROSTAT) 0.4 MG sublingual tablet FOR CHEST PAIN PLACE 1 TABLET  UNDER TONGUE EVERY 5 MINUTES FOR 3 DOSES. IF SYMPTOMS PERSIST 5  MINUTES AFTER 1ST DOSE CALL 911 25 tablet 0    pantoprazole (PROTONIX) 20 MG EC tablet Take 1 tablet (20 mg) by mouth daily 90 tablet 3    torsemide (DEMADEX) 20 MG tablet Take 2 tablets (40 mg) by mouth daily 270 tablet 1    Vitamin D3 (CHOLECALCIFEROL) 25 mcg (1000 units) tablet Take 25 mcg by mouth daily       Current Facility-Administered Medications   Medication Dose Route Frequency Provider Last Rate Last Admin    acetaminophen (TYLENOL) tablet 650 mg  650 mg Oral Q4H PRN Hortencia Ohara MD        Or    acetaminophen (TYLENOL) Suppository 650 mg  650 mg Rectal Q4H PRN  Hortencia Ohara MD        [START ON 4/15/2024] azithromycin (ZITHROMAX) 250 mg in sodium chloride 0.9 % 250 mL intermittent infusion  250 mg Intravenous Q24H Hortencia Ohara MD        azithromycin (ZITHROMAX) 500 mg vial to attach to  mL bag  500 mg Intravenous Once Hortencia Ohara MD        calcium carbonate (TUMS) chewable tablet 1,000 mg  1,000 mg Oral 4x Daily PRN Hortencia Ohara MD        cefTRIAXone (ROCEPHIN) 2 g vial to attach to  ml bag for ADULTS or NS 50 ml bag for PEDS  2 g Intravenous Q24H Hortencia Ohara MD        glucose gel 15-30 g  15-30 g Oral Q15 Min PRN Hortencia Ohara MD        Or    dextrose 50 % injection 25-50 mL  25-50 mL Intravenous Q15 Min PRN Hortencia Ohara MD        Or    glucagon injection 1 mg  1 mg Subcutaneous Q15 Min PRN Hortencia Ohara MD        diltiazem (CARDIZEM) 125 mg in sodium chloride 0.9 % 125 mL infusion  5-15 mg/hr Intravenous Continuous Hortencia Ohara MD   Stopped at 04/14/24 1521    furosemide (LASIX) injection 40 mg  40 mg Intravenous Q8H Hortencia Ohara MD        insulin aspart (NovoLOG) injection (RAPID ACTING)  1-10 Units Subcutaneous TID AC Hortencia Ohara MD        insulin aspart (NovoLOG) injection (RAPID ACTING)  1-7 Units Subcutaneous At Bedtime Hortencia Ohara MD        ipratropium - albuterol 0.5 mg/2.5 mg/3 mL (DUONEB) neb solution 3 mL  3 mL Nebulization Q4H WA Hortencia Ohara MD        lidocaine (LMX4) cream   Topical Q1H PRN Hortencia Ohara MD        lidocaine (LMX4) cream   Topical Q1H PRN Hortencia Ohara MD        lidocaine 1 % 0.1-1 mL  0.1-1 mL Other Q1H PRN Hortencia Ohara MD        lidocaine 1 % 0.1-1 mL  0.1-1 mL Other Q1H PRN Hortencia Ohara MD        melatonin tablet 1 mg  1 mg Oral At Bedtime PRN Hortencia Ohara MD        methylPREDNISolone sodium succinate (solu-MEDROL) injection 62.5 mg  62.5 mg Intravenous Q12H Hortencia Ohara MD         No anticoagulants IF patient has had acute trauma/surgery or recent intracranial, GI or urinary tract bleeding.    Other DOES NOT GO TO Hortencia Painting MD        Patient is already receiving anticoagulation with heparin, enoxaparin (LOVENOX), warfarin (COUMADIN)  or other anticoagulant medication   Does not apply Continuous PRN Hortencia Ohara MD        senna-docusate (SENOKOT-S/PERICOLACE) 8.6-50 MG per tablet 1 tablet  1 tablet Oral BID PRN Hortencia Ohara MD        Or    senna-docusate (SENOKOT-S/PERICOLACE) 8.6-50 MG per tablet 2 tablet  2 tablet Oral BID PRN Hortencia Ohara MD        sodium chloride (PF) 0.9% PF flush 3 mL  3 mL Intracatheter Q8H Hortencia Ohara MD        sodium chloride (PF) 0.9% PF flush 3 mL  3 mL Intracatheter q1 min prHortencia Shay MD        sodium chloride (PF) 0.9% PF flush 3 mL  3 mL Intracatheter Q8H Hortencia Ohara MD        sodium chloride (PF) 0.9% PF flush 3 mL  3 mL Intracatheter q1 min prn Hortencia Ohara MD         Current Outpatient Medications   Medication Sig Dispense Refill    acetaminophen (TYLENOL) 500 MG tablet Take 1,000 mg by mouth 2 times daily      albuterol (PROAIR HFA/PROVENTIL HFA/VENTOLIN HFA) 108 (90 Base) MCG/ACT inhaler Inhale 2 puffs into the lungs every 6 hours as needed for shortness of breath, wheezing or cough For shortness of breath      apixaban ANTICOAGULANT (ELIQUIS) 2.5 MG tablet Take 1 tablet (2.5 mg) by mouth 2 times daily 180 tablet 3    atorvastatin (LIPITOR) 20 MG tablet Take 1 tablet (20 mg) by mouth daily for cholesterol 100 tablet 3    buPROPion (WELLBUTRIN SR) 100 MG 12 hr tablet Take 1 tablet (100 mg) by mouth daily for mood 90 tablet 3    calcium carbonate (TUMS) 500 MG chewable tablet Take 1 chew tab by mouth 2 times daily as needed for heartburn      camphor-menthol-methyl salicylate 3.1-6-10 % PTCH Apply 1 patch topically as needed (hand pain) Or back pain      DULoxetine (CYMBALTA) 60  MG capsule Take 1 capsule (60 mg) by mouth daily 90 capsule 3    fexofenadine (ALLEGRA) 180 MG tablet Take 180 mg by mouth every evening      fluticasone (FLONASE) 50 MCG/ACT nasal spray Spray 1 spray into both nostrils daily as needed       fluticasone-salmeterol (WIXELA INHUB) 100-50 MCG/ACT inhaler USE 1 INHALATION BY MOUTH EVERY  12 HOURS (Patient taking differently: Inhale 1 puff into the lungs daily) 180 each 3    glimepiride (AMARYL) 2 MG tablet Take 1 tablet (2 mg) by mouth 2 times daily (before meals) 200 tablet 2    Menthol (Topical Analgesic) 7.5 % (Roll) MISC Apply to affected area every morning      metoprolol tartrate (LOPRESSOR) 25 MG tablet Take 3 tablets (75 mg) by mouth 2 times daily 540 tablet 1    Multiple Vitamins-Minerals (HAIR SKIN AND NAILS FORMULA PO) Take 1 tablet by mouth daily      nitroGLYcerin (NITROSTAT) 0.4 MG sublingual tablet FOR CHEST PAIN PLACE 1 TABLET  UNDER TONGUE EVERY 5 MINUTES FOR 3 DOSES. IF SYMPTOMS PERSIST 5  MINUTES AFTER 1ST DOSE CALL 911 25 tablet 0    pantoprazole (PROTONIX) 20 MG EC tablet Take 1 tablet (20 mg) by mouth daily 90 tablet 3    torsemide (DEMADEX) 20 MG tablet Take 2 tablets (40 mg) by mouth daily 270 tablet 1    Vitamin D3 (CHOLECALCIFEROL) 25 mcg (1000 units) tablet Take 25 mcg by mouth daily       Allergies   Allergies   Allergen Reactions    Aspirin Hives     Reaction occurred during childhood.     Lidocaine Itching    Lisinopril Cough    Losartan      Hyperkalemia      Metformin      Elevated lactic acid    Minocycline      Yellow Dye Allergy. Minocycline has Yellow Dye #10.    Mounjaro [Tirzepatide] Diarrhea and GI Disturbance    Salicylates Hives    Yellow Dye Hives     Rxn to yellow tablet. Eyes swelled shut.     Yellow Dyes (Non-Tartrazine) Hives       Social History    reports that she quit smoking about 51 years ago. Her smoking use included cigarettes. She started smoking about 52 years ago. She has a 0.3 pack-year smoking history. She has  "never used smokeless tobacco. She reports current alcohol use. She reports that she does not use drugs.    Family History   Family History   Problem Relation Age of Onset    Neurologic Disorder Mother         MS - at 60's    C.A.D. Father          at 8o's, ? prostate ca    Breast Cancer No family hx of     Cancer - colorectal No family hx of        Review of Systems   The comprehensive 10 point Review of Systems is negative other than noted in the HPI or here.     Physical Exam   Vital Signs with Ranges  Temp:  [98.9  F (37.2  C)] 98.9  F (37.2  C)  Pulse:  [111-161] 111  Resp:  [19-48] 23  BP: (107-148)/() 107/87  SpO2:  [88 %-97 %] 92 %  Wt Readings from Last 4 Encounters:   24 120.2 kg (265 lb)   24 118.4 kg (261 lb)   24 118.8 kg (261 lb 12.8 oz)   01/10/24 116.1 kg (256 lb)     No intake/output data recorded.      Vitals: /87   Pulse 111   Temp 98.9  F (37.2  C) (Temporal)   Resp 23   Ht 1.702 m (5' 7\")   Wt 120.2 kg (265 lb)   SpO2 92%   BMI 41.50 kg/m      General: Alert, oriented, in no acute distress  HEENT: PERRLA, mucous membranes moist  Neck: Normal JVP  CV: Tachycardic, irregular rhythm, grade 3/6 systolic murmur loudest at sternal border  Respiratory: Clear to auscultation bilaterally  GI: Normoactive bowel sounds; soft, non-tender abdomen  Neuro: No focal deficits appreciated  Extremities: Trace peripheral edema bilaterally    Recent Labs   Lab 24  1231   WBC 10.0   HGB 13.9   MCV 82      *   POTASSIUM 4.6   CHLORIDE 96*   CO2 24   BUN 30.6*   CR 1.63*   GFRESTIMATED 30*   ANIONGAP 13   TELLY 9.3   *     Recent Labs   Lab Test 23  1421 10/24/22  1245   CHOL 140 145   HDL 45* 44*   LDL 57 52   TRIG 189* 246*     Recent Labs   Lab 24  1231   WBC 10.0   HGB 13.9   HCT 44.8   MCV 82        Recent Labs   Lab 24  1234   PHV 7.35   PO2V 37   PCO2V 42   HCO3V 23     Recent Labs   Lab 24  1231   NTBNPI 4,002* " "    No results for input(s): \"DD\" in the last 168 hours.  No results for input(s): \"SED\", \"CRP\" in the last 168 hours.  Recent Labs   Lab 04/14/24  1231        No results for input(s): \"TSH\" in the last 168 hours.  No results for input(s): \"COLOR\", \"APPEARANCE\", \"URINEGLC\", \"URINEBILI\", \"URINEKETONE\", \"SG\", \"UBLD\", \"URINEPH\", \"PROTEIN\", \"UROBILINOGEN\", \"NITRITE\", \"LEUKEST\", \"RBCU\", \"WBCU\" in the last 168 hours.    Imaging:  Recent Results (from the past 48 hour(s))   XR Chest Port 1 View    Narrative    EXAM: XR CHEST PORT 1 VIEW  LOCATION: [facility]  DATE: 4/14/2024    INDICATION: SHORTNESS OF BREATH  COMPARISON: 12/27/2023 and 11/25/2023      Impression    IMPRESSION: Markedly shallow rotation and mild patient rotation to the right. Mild mid and lower lung interstitial edema. No focal consolidation. Minimal pleural fluid and/or thickening inferior hemithoraces. Mild generalized cardiac enlargement and   borderline pulmonary venous congestion.         Medical Decision Making       80 MINUTES SPENT BY ME on the date of service doing chart review, history, exam, documentation & further activities per the note.                  "

## 2024-04-14 NOTE — ED PROVIDER NOTES
History     Chief Complaint:  Shortness of Breath       HPI   Moira Andre is a 89 year old female on Eliquis with a history of COPD, CAD, type 2 diabetes, hyperlipidemia, hypertension, and AFIB who presents with shortness of breath. EMS reports that the patient has been increasingly short of breath with associated cough over the last 3 days. She explains that these symptoms feel similar to previous hospitalization for A-fib RVR and heart failure exacerbation; admitted on 12/27/2023. EMS confirmed that she was in AFIB with a heart monitor. She was provided Duoneb x 2 as she was hypoxic in the mid 80s upon EMS arrival and transported to the ED for further evaluation.  Patient reports that she was discharged from the hospital on home oxygen though has not used it for at least the last 3 weeks as it dries out her nose.  She denies chest pain, fever, and recent weight gain. Moira reports not taking her Eliquis today.      Independent Historian:   EMS - They report as noted above    Review of External Notes:   I reviewed her discharge summary from 12/31/2023      Medications:    Eliquis  Lipitor  Wellbutrin  Cymbalta  Wixela  Amaryl  Lopressor  Nitrostat  Protonix  Demadex  Proair    Past Medical History:    Osteoarthritis  CKD  Morbid obesity  CAD  Type 2 diabetes  HLD  Ventral hernia  Asthma  Depression  HTN  GERD  Anxiety  Chronic pain  Nephrolithiasis  Lumbar radiculopathy  Spinal stenosis  AFIB with RVR    Past Surgical History:    Appendectomy  Biopsy breast x2  Cholecystectomy  Coronary angiography x2  HC left heart catheterization  Hysterectomy  Carpal tunnel release (B)    Physical Exam   Patient Vitals for the past 24 hrs:   BP Temp Temp src Pulse Resp SpO2 Height Weight   04/14/24 1500 107/87 -- -- 111 23 92 % -- --   04/14/24 1453 118/70 -- -- (!) 142 -- -- -- --   04/14/24 1445 118/70 -- -- (!) 142 20 93 % -- --   04/14/24 1430 (!) 146/115 -- -- (!) 156 -- 96 % -- --   04/14/24 1400 (!) 129/93 -- --  Progress Note - Wound/Podiatry   Tiny Magaña 61 y o  male MRN: [id number]  Unit/Bed#: -01 Encounter: [id number]      Assessment:   Infected ulcer/abscess right foot, osteomyelitis  WBCs still high even after 3 days of IV antibiotics  Diabetes with peripheral neuropathy and chronic diabetic ulcers  Plan:   Patient consented for incision and drainage of infected ulcer/abscess and amputation of the hallux and resection of the 1st metatarsal right foot  Subjective:  Patient reports feeling depressed  Reports feeling his usual aches and pains otherwise generally well  Objective:    Vitals: Blood pressure 126/58, pulse 75, temperature 98 2 °F (36 8 °C), temperature source Oral, resp  rate 18, height 6' (1 829 m), weight (!) 137 kg (302 lb 7 5 oz), SpO2 92 %  ,Body mass index is 41 02 kg/m²  WBCs:  17 75    Physical Exam:  Patient is alert and oriented  Mood and affect are depressed  He is in no acute distress  Neurological:  Gross sensations are diminished  Protective sensations are absent  There is no acute pain to probing of the right foot wound  Vascular:  Pedal pulses are not palpable  There is no distal cyanosis or gangrene  There is no wound cyanosis or gangrene  Musculoskeletal:  There are no new or acute deformities in the feet  There is no focal weakness  Dermatologic:  There is local edema and erythema in the right foot primarily at the 1st metatarsophalangeal joint  There is a modest amount of seropurulent drainage coming from the large plantar ulcer  The flexor tendon is now clearly visible  There is a modest amount of necrotic tissue and slough present  There is a mild malodor  There is no palpable fluctuance or crepitus proximally laterally or plantarly  Additional:  Advised patient of the potential risks/complications of surgery  Consent form reviewed with patient and signed  The patient prefers not to have MRI on account of claustrophobia    The patient understands the "(!) 138 26 94 % -- --   04/14/24 1330 (!) 112/96 -- -- (!) 161 20 94 % -- --   04/14/24 1315 (!) 136/108 -- -- (!) 149 28 97 % -- --   04/14/24 1314 130/81 -- -- (!) 135 (!) 38 96 % -- --   04/14/24 1245 (!) 133/113 -- -- (!) 146 27 93 % -- --   04/14/24 1240 -- -- -- (!) 154 (!) 48 96 % -- --   04/14/24 1235 (!) 148/115 -- -- (!) 144 (!) 36 96 % -- --   04/14/24 1229 -- -- -- (!) 152 19 94 % 1.702 m (5' 7\") 120.2 kg (265 lb)   04/14/24 1228 (!) 138/101 98.9  F (37.2  C) Temporal (!) 141 -- -- -- --   04/14/24 1227 -- -- -- (!) 152 (!) 39 (!) 88 % -- --        Physical Exam  General: Alert and cooperative with exam. Patient in moderate respiratory distress. Normal mentation.  Head:  Scalp is NC/AT  Eyes:  No scleral icterus, PERRL  ENT:  The external nose and ears are normal. The oropharynx is normal and without erythema; mucus membranes are moist. Uvula midline, no evidence of deep space infection.  Neck:  Normal range of motion without rigidity.  CV:  Irregular rate and rhythm, mildly tachycardic    No pathologic murmur   Resp:  Tachypneic, lungs clear  GI:  Abdomen is soft, no distension, no tenderness. No peritoneal signs  MS:  +1 pitting edema to lower extremities.  Skin:  Warm and dry, No rash or lesions noted.  Neuro:  Oriented x 3. No gross motor deficits.    Emergency Department Course   ECG  ECG  ECG taken at 1222, ECG read at 1340  Critical Test Result: High HR  Atrial fibrillation with rapid ventricular response with premature ventricular or aberrantly conducted complexes  Possible Anterolateral infarct, age undetermined  Abnormal ECG   Rate 147 bpm. W[place] interval * ms. QRS duration 96 ms. QT/QTc 302/472 ms. P-R-T axes * -19 109.         Imaging:  XR Chest Port 1 View   Final Result   IMPRESSION: Markedly shallow rotation and mild patient rotation to the right. Mild mid and lower lung interstitial edema. No focal consolidation. Minimal pleural fluid and/or thickening inferior hemithoraces. Mild generalized " importance of close follow up postoperatively for wound care  This will involve home care nursing and follow up in the [address] [place] [address] Road  Also the importance of compliance with medical regimen including diet and relative exercise and cessation of smoking  Lab, Imaging and other studies: I have personally reviewed pertinent reports  Other Ulcers 10/13/17 Foot Right (Active)   Wound Description Slough;Drainage;Fragile 10/14/2017  7:41 PM   Leeann-wound Assessment Denuded; Maceration 10/14/2017  7:41 PM   Shape oval 10/13/2017  4:53 PM   Wound Length (cm) 4 5 cm 10/13/2017  4:53 PM   Wound Width (cm) 2 5 cm 10/13/2017  4:53 PM   Wound Depth (cm) 1 5 10/13/2017  4:53 PM   Calculated Wound Volume (cm^3) 16 88 cm^3 10/13/2017  4:53 PM   Drainage Amount Moderate 10/14/2017  7:41 PM   Drainage Description Foul smelling;Serosanguineous 10/13/2017  4:53 PM   Treatment Cleansed 10/14/2017  7:41 PM   Dressings Other (Comment) 10/14/2017  7:41 PM   Wound packed? Yes 10/14/2017  7:41 PM   Dressing Changed New dressing applied 10/14/2017  7:41 PM   Patient Tolerance Tolerated well 10/14/2017  7:41 PM   Dressing Status Clean;Dry; Intact 10/14/2017  7:41 PM       Other Ulcers 10/13/17 Foot Left (Active)   Wound Description Granulation tissue;Pink;Clean 10/14/2017  7:41 PM   Leeann-wound Assessment Clean;Pink 10/14/2017  7:41 PM   Size oval 10/13/2017  4:53 PM   Wound Length (cm) 6 cm 10/13/2017  4:53 PM   Wound Width (cm) 2 75 cm 10/13/2017  4:53 PM   Drainage Amount Small 10/14/2017  7:41 PM   Drainage Description Foul smelling;Serosanguineous 10/14/2017  7:41 PM   Treatment Cleansed 10/14/2017  7:41 PM   Dressings Vaseline gauze 10/14/2017  7:41 PM   Wound packed? Yes 10/14/2017  7:41 PM   Dressing Status Clean;Dry; Intact 10/14/2017  7:41 PM       Other Ulcers 10/13/17 Foot Right; Inner (Active)   Wound Description Fragile;Pink 10/14/2017  7:41 PM   Leeann-wound Assessment Clean;Dry; Intact 10/14/2017  7:41 PM   Wound Length (cm) 0 5 cm 10/13/2017  4:53 PM   Wound Width (cm) 0 75 cm 10/13/2017  4:53 PM   Drainage Amount Small 10/14/2017  7:41 PM   Drainage Description Foul smelling 10/14/2017  7:41 PM   Treatment Cleansed 10/14/2017  7:41 PM   Dressings Other (Comment) 10/14/2017  7:41 PM   Wound packed? Yes 10/14/2017  7:41 PM   Dressing Changed Changed 10/14/2017  7:41 PM   Patient Tolerance Tolerated well 10/14/2017  7:41 PM   Dressing Status Clean;Dry; Intact 10/14/2017  7:41 PM cardiac enlargement and    borderline pulmonary venous congestion.      Echocardiogram Complete    (Results Pending)   Echocardiogram Complete    (Results Pending)          Laboratory:  Labs Ordered and Resulted from Time of ED Arrival to Time of ED Departure   BASIC METABOLIC PANEL - Abnormal       Result Value    Sodium 133 (*)     Potassium 4.6      Chloride 96 (*)     Carbon Dioxide (CO2) 24      Anion Gap 13      Urea Nitrogen 30.6 (*)     Creatinine 1.63 (*)     GFR Estimate 30 (*)     Calcium 9.3      Glucose 321 (*)    TROPONIN T, HIGH SENSITIVITY - Abnormal    Troponin T, High Sensitivity 35 (*)    NT PROBNP INPATIENT - Abnormal    N terminal Pro BNP Inpatient 4,002 (*)    CBC WITH PLATELETS AND DIFFERENTIAL - Abnormal    WBC Count 10.0      RBC Count 5.45 (*)     Hemoglobin 13.9      Hematocrit 44.8      MCV 82      MCH 25.5 (*)     MCHC 31.0 (*)     RDW 16.9 (*)     Platelet Count 264      % Neutrophils 81      % Lymphocytes 12      % Monocytes 7      % Eosinophils 0      % Basophils 0      % Immature Granulocytes 0      NRBCs per 100 WBC 0      Absolute Neutrophils 8.0      Absolute Lymphocytes 1.2      Absolute Monocytes 0.7      Absolute Eosinophils 0.0      Absolute Basophils 0.0      Absolute Immature Granulocytes 0.0      Absolute NRBCs 0.0     ISTAT GASES LACTATE VENOUS POCT - Abnormal    Lactic Acid POCT 2.6 (*)     Bicarbonate Venous POCT 23      O2 Sat, Venous POCT 68 (*)     pCO2 Venous POCT 42      pH Venous POCT 7.35      pO2 Venous POCT 37      Base Excess/Deficit (+/-) POCT -2.0     INFLUENZA A/B, RSV, & SARS-COV2 PCR - Normal    Influenza A PCR Negative      Influenza B PCR Negative      RSV PCR Negative      SARS CoV2 PCR Negative     MAGNESIUM - Normal    Magnesium 2.0     PROCALCITONIN - Normal    Procalcitonin 0.12     LACTIC ACID WHOLE BLOOD - Normal    Lactic Acid 2.0     GLUCOSE MONITOR NURSING POCT   GLUCOSE MONITOR NURSING POCT   GLUCOSE MONITOR NURSING POCT   GLUCOSE MONITOR  NURSING POCT        Emergency Department Course & Assessments:    Interventions:  Medications   lidocaine 1 % 0.1-1 mL (has no administration in time range)   lidocaine (LMX4) cream (has no administration in time range)   sodium chloride (PF) 0.9% PF flush 3 mL (3 mLs Intracatheter Not Given 4/14/24 1533)   sodium chloride (PF) 0.9% PF flush 3 mL (has no administration in time range)   senna-docusate (SENOKOT-S/PERICOLACE) 8.6-50 MG per tablet 1 tablet (has no administration in time range)     Or   senna-docusate (SENOKOT-S/PERICOLACE) 8.6-50 MG per tablet 2 tablet (has no administration in time range)   calcium carbonate (TUMS) chewable tablet 1,000 mg (has no administration in time range)   No anticoagulants IF patient has had acute trauma/surgery or recent intracranial, GI or urinary tract bleeding.  (has no administration in time range)   lidocaine 1 % 0.1-1 mL (has no administration in time range)   lidocaine (LMX4) cream (has no administration in time range)   sodium chloride (PF) 0.9% PF flush 3 mL (3 mLs Intracatheter Not Given 4/14/24 1532)   sodium chloride (PF) 0.9% PF flush 3 mL (has no administration in time range)   Patient is already receiving anticoagulation with heparin, enoxaparin (LOVENOX), warfarin (COUMADIN)  or other anticoagulant medication (has no administration in time range)   acetaminophen (TYLENOL) tablet 650 mg (has no administration in time range)     Or   acetaminophen (TYLENOL) Suppository 650 mg (has no administration in time range)   melatonin tablet 1 mg (has no administration in time range)   methylPREDNISolone sodium succinate (solu-MEDROL) injection 62.5 mg (has no administration in time range)   ipratropium - albuterol 0.5 mg/2.5 mg/3 mL (DUONEB) neb solution 3 mL (has no administration in time range)   metoprolol (LOPRESSOR) injection 5 mg (5 mg Intravenous $Given 4/14/24 1453)   diltiazem (CARDIZEM) 125 mg in sodium chloride 0.9 % 125 mL infusion (0 mg/hr Intravenous Stopped  4/14/24 1521)   cefTRIAXone (ROCEPHIN) 2 g vial to attach to  ml bag for ADULTS or NS 50 ml bag for PEDS (has no administration in time range)   azithromycin (ZITHROMAX) 500 mg vial to attach to  mL bag (has no administration in time range)   azithromycin (ZITHROMAX) 250 mg in sodium chloride 0.9 % 250 mL intermittent infusion (has no administration in time range)   glucose gel 15-30 g (has no administration in time range)     Or   dextrose 50 % injection 25-50 mL (has no administration in time range)     Or   glucagon injection 1 mg (has no administration in time range)   insulin aspart (NovoLOG) injection (RAPID ACTING) (has no administration in time range)   insulin aspart (NovoLOG) injection (RAPID ACTING) (has no administration in time range)   furosemide (LASIX) injection 40 mg (has no administration in time range)   diltiazem (CARDIZEM) injection 10 mg (10 mg Intravenous $Given 4/14/24 1240)   furosemide (LASIX) injection 40 mg (40 mg Intravenous $Given 4/14/24 1351)        Assessments:  1227 I obtained history and examined the patient as noted above  0132 I rechecked the patient and explained findings.      Independent Interpretation (X-rays, CTs, rhythm strip):  Chest x-ray: Mild interstitial edema to lower lobes without evidence of focal infiltrate, effusion, or pneumothorax    Consultations/Discussion of Management or Tests:  1351 I spoke with Dr. Ohara, hospitalist, who accepts the patient       Social Determinants of Health affecting care:   None    Disposition:  The patient was admitted to the hospital under the care of Dr. Ohara.     Impression & Plan      Medical Decision Making:  Moira Andre is a 89 year old female who presents for evaluation of shortness of breath with associated cough.  Presentation is consistent with atrial fibrillation with rapid ventricular response and heart failure exacerbation.  History of atrial fibrillation anticoagulated on Eliquis.  Labs, EKG,  and imaging was obtained.  On evaluation patient is mildly hypoxic and this resolved with 3 L nasal cannula.  Chest x-ray demonstrates mild bilateral interstitial pulmonary edema and patient is noted to have mild pitting edema to lower extremities; appears mildly volume overloaded.  Provided 40 mg Lasix.  EKG demonstrates atrial fibrillation with RVR.  Troponin was minimally elevated though this is felt to be secondary to demand ischemia and there is low clinical concern for ACS or PE.  No indication for emergent cardioversion.  She was noted to be hypertensive on initial arrival.  Patient provided diltiazem bolus and initiated on infusion with some improvement in heart rate and improvement in symptoms.  Lactic acid mildly elevated (2.6; low suspicion for infectious etiology; elevation likely secondary to hypoxia and demand ischemia).  Labs also notable for chronic renal insufficiency (creatinine 1.63), hyperglycemia (glucose 321), elevated BNP (4002), VBG without evidence of acidosis or CO2 retention, and negative influenza/COVID/RSV testing.  She remained stable throughout my care and will be [facility] with the [facility]ist service.      Diagnosis:    ICD-10-CM    1. Atrial fibrillation with RVR (H)  I48.91       2. Acute on chronic congestive heart failure, unspecified heart failure type (H)  I50.9       3. SOB (shortness of breath)  R06.02       4. Acute respiratory failure with hypoxia (H)  J96.01                Scribe Disclosure:  IMateo, am serving as a scribe  for Nelsy Zambrano at 1:15 PM on 4/14/2024  Nelsy MCKENNA, am serving as a scribe at 1:15 PM on 4/14/2024 to document services personally performed by Lyle Martino DO based on my observations and the provider's statements to me.   4/14/2024   Lyle Martino DO O'Neill, Christopher Warren, DO  04/14/24 1547

## 2024-04-15 ENCOUNTER — APPOINTMENT (OUTPATIENT)
Dept: OCCUPATIONAL THERAPY | Facility: CLINIC | Age: 89
DRG: 291 | End: 2024-04-15
Attending: INTERNAL MEDICINE
Payer: COMMERCIAL

## 2024-04-15 ENCOUNTER — APPOINTMENT (OUTPATIENT)
Dept: CARDIOLOGY | Facility: CLINIC | Age: 89
DRG: 291 | End: 2024-04-15
Attending: INTERNAL MEDICINE
Payer: COMMERCIAL

## 2024-04-15 ENCOUNTER — PATIENT OUTREACH (OUTPATIENT)
Dept: CARE COORDINATION | Facility: CLINIC | Age: 89
End: 2024-04-15
Payer: COMMERCIAL

## 2024-04-15 ENCOUNTER — APPOINTMENT (OUTPATIENT)
Dept: PHYSICAL THERAPY | Facility: CLINIC | Age: 89
DRG: 291 | End: 2024-04-15
Attending: INTERNAL MEDICINE
Payer: COMMERCIAL

## 2024-04-15 LAB
ANION GAP SERPL CALCULATED.3IONS-SCNC: 14 MMOL/L (ref 7–15)
BUN SERPL-MCNC: 36.3 MG/DL (ref 8–23)
CALCIUM SERPL-MCNC: 9 MG/DL (ref 8.2–9.6)
CHLORIDE SERPL-SCNC: 98 MMOL/L (ref 98–107)
CREAT SERPL-MCNC: 1.84 MG/DL (ref 0.51–0.95)
DEPRECATED HCO3 PLAS-SCNC: 25 MMOL/L (ref 22–29)
EGFRCR SERPLBLD CKD-EPI 2021: 26 ML/MIN/1.73M2
ERYTHROCYTE [DISTWIDTH] IN BLOOD BY AUTOMATED COUNT: 16.8 % (ref 10–15)
GLUCOSE BLDC GLUCOMTR-MCNC: 144 MG/DL (ref 70–99)
GLUCOSE BLDC GLUCOMTR-MCNC: 160 MG/DL (ref 70–99)
GLUCOSE BLDC GLUCOMTR-MCNC: 229 MG/DL (ref 70–99)
GLUCOSE BLDC GLUCOMTR-MCNC: 286 MG/DL (ref 70–99)
GLUCOSE BLDC GLUCOMTR-MCNC: 294 MG/DL (ref 70–99)
GLUCOSE SERPL-MCNC: 249 MG/DL (ref 70–99)
HCT VFR BLD AUTO: 41.4 % (ref 35–47)
HGB BLD-MCNC: 12.9 G/DL (ref 11.7–15.7)
MAGNESIUM SERPL-MCNC: 2.2 MG/DL (ref 1.7–2.3)
MCH RBC QN AUTO: 26 PG (ref 26.5–33)
MCHC RBC AUTO-ENTMCNC: 31.2 G/DL (ref 31.5–36.5)
MCV RBC AUTO: 83 FL (ref 78–100)
PLATELET # BLD AUTO: 268 10E3/UL (ref 150–450)
POTASSIUM SERPL-SCNC: 4.9 MMOL/L (ref 3.4–5.3)
RBC # BLD AUTO: 4.97 10E6/UL (ref 3.8–5.2)
SODIUM SERPL-SCNC: 137 MMOL/L (ref 135–145)
WBC # BLD AUTO: 7.3 10E3/UL (ref 4–11)

## 2024-04-15 PROCEDURE — 250N000011 HC RX IP 250 OP 636: Performed by: INTERNAL MEDICINE

## 2024-04-15 PROCEDURE — 255N000002 HC RX 255 OP 636: Performed by: INTERNAL MEDICINE

## 2024-04-15 PROCEDURE — 83735 ASSAY OF MAGNESIUM: CPT | Performed by: INTERNAL MEDICINE

## 2024-04-15 PROCEDURE — 250N000012 HC RX MED GY IP 250 OP 636 PS 637: Performed by: INTERNAL MEDICINE

## 2024-04-15 PROCEDURE — 97530 THERAPEUTIC ACTIVITIES: CPT | Mod: GP

## 2024-04-15 PROCEDURE — 999N000157 HC STATISTIC RCP TIME EA 10 MIN

## 2024-04-15 PROCEDURE — 250N000013 HC RX MED GY IP 250 OP 250 PS 637: Performed by: INTERNAL MEDICINE

## 2024-04-15 PROCEDURE — 99233 SBSQ HOSP IP/OBS HIGH 50: CPT | Performed by: INTERNAL MEDICINE

## 2024-04-15 PROCEDURE — 210N000001 HC R&B IMCU HEART CARE

## 2024-04-15 PROCEDURE — 250N000009 HC RX 250: Performed by: INTERNAL MEDICINE

## 2024-04-15 PROCEDURE — 97165 OT EVAL LOW COMPLEX 30 MIN: CPT | Mod: GO

## 2024-04-15 PROCEDURE — 97535 SELF CARE MNGMENT TRAINING: CPT | Mod: GO

## 2024-04-15 PROCEDURE — 93306 TTE W/DOPPLER COMPLETE: CPT | Mod: 26 | Performed by: INTERNAL MEDICINE

## 2024-04-15 PROCEDURE — 99207 PR CDG-CUT & PASTE-POTENTIAL IMPACT ON LEVEL: CPT | Performed by: INTERNAL MEDICINE

## 2024-04-15 PROCEDURE — 999N000208 ECHOCARDIOGRAM COMPLETE

## 2024-04-15 PROCEDURE — 85027 COMPLETE CBC AUTOMATED: CPT | Performed by: INTERNAL MEDICINE

## 2024-04-15 PROCEDURE — 97161 PT EVAL LOW COMPLEX 20 MIN: CPT | Mod: GP

## 2024-04-15 PROCEDURE — 250N000013 HC RX MED GY IP 250 OP 250 PS 637: Performed by: HOSPITALIST

## 2024-04-15 PROCEDURE — 80048 BASIC METABOLIC PNL TOTAL CA: CPT | Performed by: INTERNAL MEDICINE

## 2024-04-15 PROCEDURE — 258N000003 HC RX IP 258 OP 636: Performed by: INTERNAL MEDICINE

## 2024-04-15 PROCEDURE — 94640 AIRWAY INHALATION TREATMENT: CPT | Mod: 76

## 2024-04-15 PROCEDURE — 99233 SBSQ HOSP IP/OBS HIGH 50: CPT | Mod: FS

## 2024-04-15 PROCEDURE — 36415 COLL VENOUS BLD VENIPUNCTURE: CPT | Performed by: INTERNAL MEDICINE

## 2024-04-15 PROCEDURE — 94640 AIRWAY INHALATION TREATMENT: CPT

## 2024-04-15 RX ORDER — LEVALBUTEROL INHALATION SOLUTION 1.25 MG/3ML
1.25 SOLUTION RESPIRATORY (INHALATION) 4 TIMES DAILY
Status: DISCONTINUED | OUTPATIENT
Start: 2024-04-15 | End: 2024-04-16

## 2024-04-15 RX ORDER — DEXTROSE MONOHYDRATE 25 G/50ML
25-50 INJECTION, SOLUTION INTRAVENOUS
Status: DISCONTINUED | OUTPATIENT
Start: 2024-04-15 | End: 2024-04-15

## 2024-04-15 RX ORDER — METHYLPREDNISOLONE SODIUM SUCCINATE 125 MG/2ML
60 INJECTION, POWDER, LYOPHILIZED, FOR SOLUTION INTRAMUSCULAR; INTRAVENOUS EVERY 12 HOURS
Status: DISCONTINUED | OUTPATIENT
Start: 2024-04-15 | End: 2024-04-16

## 2024-04-15 RX ORDER — METHYLPREDNISOLONE SODIUM SUCCINATE 125 MG/2ML
60 INJECTION, POWDER, LYOPHILIZED, FOR SOLUTION INTRAMUSCULAR; INTRAVENOUS EVERY 24 HOURS
Status: DISCONTINUED | OUTPATIENT
Start: 2024-04-16 | End: 2024-04-15

## 2024-04-15 RX ORDER — NICOTINE POLACRILEX 4 MG
15-30 LOZENGE BUCCAL
Status: DISCONTINUED | OUTPATIENT
Start: 2024-04-15 | End: 2024-04-22 | Stop reason: HOSPADM

## 2024-04-15 RX ORDER — DEXTROSE MONOHYDRATE 25 G/50ML
25-50 INJECTION, SOLUTION INTRAVENOUS
Status: DISCONTINUED | OUTPATIENT
Start: 2024-04-15 | End: 2024-04-22 | Stop reason: HOSPADM

## 2024-04-15 RX ORDER — MICONAZOLE NITRATE 20 MG/G
CREAM TOPICAL 2 TIMES DAILY
Status: DISCONTINUED | OUTPATIENT
Start: 2024-04-15 | End: 2024-04-22 | Stop reason: HOSPADM

## 2024-04-15 RX ORDER — GUAIFENESIN/DEXTROMETHORPHAN 100-10MG/5
10 SYRUP ORAL EVERY 4 HOURS PRN
Status: DISCONTINUED | OUTPATIENT
Start: 2024-04-15 | End: 2024-04-16

## 2024-04-15 RX ORDER — NICOTINE POLACRILEX 4 MG
15-30 LOZENGE BUCCAL
Status: DISCONTINUED | OUTPATIENT
Start: 2024-04-15 | End: 2024-04-15

## 2024-04-15 RX ADMIN — CALCIUM CARBONATE (ANTACID) CHEW TAB 500 MG 1000 MG: 500 CHEW TAB at 18:08

## 2024-04-15 RX ADMIN — INSULIN GLARGINE 3 UNITS: 100 INJECTION, SOLUTION SUBCUTANEOUS at 11:35

## 2024-04-15 RX ADMIN — METOPROLOL TARTRATE 75 MG: 50 TABLET, FILM COATED ORAL at 09:54

## 2024-04-15 RX ADMIN — METHYLPREDNISOLONE SODIUM SUCCINATE 62.5 MG: 125 INJECTION, POWDER, FOR SOLUTION INTRAMUSCULAR; INTRAVENOUS at 18:05

## 2024-04-15 RX ADMIN — APIXABAN 2.5 MG: 2.5 TABLET, FILM COATED ORAL at 08:11

## 2024-04-15 RX ADMIN — MICONAZOLE NITRATE: 20 CREAM TOPICAL at 23:59

## 2024-04-15 RX ADMIN — MICONAZOLE NITRATE: 20 CREAM TOPICAL at 11:27

## 2024-04-15 RX ADMIN — APIXABAN 2.5 MG: 2.5 TABLET, FILM COATED ORAL at 20:06

## 2024-04-15 RX ADMIN — FEXOFENADINE HCL 180 MG: 180 TABLET ORAL at 20:06

## 2024-04-15 RX ADMIN — CEFTRIAXONE SODIUM 2 G: 2 INJECTION, POWDER, FOR SOLUTION INTRAMUSCULAR; INTRAVENOUS at 15:09

## 2024-04-15 RX ADMIN — ACETAMINOPHEN 650 MG: 325 TABLET, FILM COATED ORAL at 21:42

## 2024-04-15 RX ADMIN — HUMAN ALBUMIN MICROSPHERES AND PERFLUTREN 3 ML: 10; .22 INJECTION, SOLUTION INTRAVENOUS at 10:40

## 2024-04-15 RX ADMIN — Medication 25 MCG: at 10:00

## 2024-04-15 RX ADMIN — LEVALBUTEROL HYDROCHLORIDE 1.25 MG: 1.25 SOLUTION RESPIRATORY (INHALATION) at 20:12

## 2024-04-15 RX ADMIN — METOPROLOL TARTRATE 75 MG: 50 TABLET, FILM COATED ORAL at 20:06

## 2024-04-15 RX ADMIN — AZITHROMYCIN MONOHYDRATE 250 MG: 500 INJECTION, POWDER, LYOPHILIZED, FOR SOLUTION INTRAVENOUS at 18:05

## 2024-04-15 RX ADMIN — DULOXETINE HYDROCHLORIDE 60 MG: 60 CAPSULE, DELAYED RELEASE ORAL at 08:11

## 2024-04-15 RX ADMIN — GUAIFENESIN AND DEXTROMETHORPHAN 10 ML: 100; 10 SYRUP ORAL at 18:46

## 2024-04-15 RX ADMIN — METHYLPREDNISOLONE SODIUM SUCCINATE 62.5 MG: 125 INJECTION, POWDER, FOR SOLUTION INTRAMUSCULAR; INTRAVENOUS at 04:47

## 2024-04-15 RX ADMIN — PANTOPRAZOLE SODIUM 20 MG: 20 TABLET, DELAYED RELEASE ORAL at 08:12

## 2024-04-15 RX ADMIN — BUPROPION HYDROCHLORIDE 100 MG: 100 TABLET, FILM COATED, EXTENDED RELEASE ORAL at 20:06

## 2024-04-15 RX ADMIN — LEVALBUTEROL HYDROCHLORIDE 1.25 MG: 1.25 SOLUTION RESPIRATORY (INHALATION) at 11:38

## 2024-04-15 RX ADMIN — ATORVASTATIN CALCIUM 20 MG: 20 TABLET, FILM COATED ORAL at 20:06

## 2024-04-15 ASSESSMENT — ACTIVITIES OF DAILY LIVING (ADL)
ADLS_ACUITY_SCORE: 36
ADLS_ACUITY_SCORE: 33
ADLS_ACUITY_SCORE: 33
ADLS_ACUITY_SCORE: 36
ADLS_ACUITY_SCORE: 33
ADLS_ACUITY_SCORE: 36
ADLS_ACUITY_SCORE: 33
ADLS_ACUITY_SCORE: 36
ADLS_ACUITY_SCORE: 33
ADLS_ACUITY_SCORE: 36
ADLS_ACUITY_SCORE: 36
ADLS_ACUITY_SCORE: 33
ADLS_ACUITY_SCORE: 36
ADLS_ACUITY_SCORE: 33
ADLS_ACUITY_SCORE: 33
ADLS_ACUITY_SCORE: 36
ADLS_ACUITY_SCORE: 33
ADLS_ACUITY_SCORE: 36
ADLS_ACUITY_SCORE: 33

## 2024-04-15 NOTE — PROGRESS NOTES
[facility]    ~Cardiology Progress Note~    Primary Cardiologist: Dr. Infante     Date of Admission: 4/14/2024  Service Date: 04/15/24    Summary:  Ms. Moira Andre is a very pleasant 89 year old female who was admitted on 4/14/2024 for atrial fibrillation with RVR in the setting of a COPD exacerbation.     Interval April 15, 2024:  No acute events overnight. Telemetry reviewed- atrial fibrillation with HR 80-90s          Assessment and Impression:     Atrial fibrillation with RVR  Rapid rates are likely secondary to COPD exacerbation  Left atrium mild to moderately dilated   Echocardiogram 4/15- normal LV size and systolic function  Rates controlled, HR 80-90s   [id number] Score 7 (age ++, sex, CHF hx, HTN, vascular disease and DM hx)  Anticoagulated with Eliquis 2.5 mg BID     COPD exacerbation     Moderate aortic valve stenosis   Echocardiogram 4/15- mean AoV pressure gradient is 17.7 mmHg. The calculated aortic valve are is 1.1 cm^2. The peak AoV pressure gradient is 31.0 mmHg.    Moderate mitral valve stenosis    History of CAD s/p PCI in 2007            Recommendations and Plan:     Continue metoprolol tartrate 75 mg BID   From cardiology standpoint, patient cleared for discharge   Coordinating outpatient cardiology follow up   Cardiology will sign off     Plan of care was formulated under the direction and guidance of Dr. Cano.         Nasreen Gaona PA-C  Physician Assistant   [facility]  Pager: [phone]      Patient Active Problem List   Diagnosis    CKD (chronic kidney disease) stage 3, GFR 30-59 ml/min (H)    Morbid obesity (H)    Coronary artery disease involving native coronary artery of native heart with angina pectoris (H24)    Type 2 diabetes mellitus with diabetic nephropathy (H)    Transient cerebral ischemia    Hyperlipidemia LDL goal <70    Ventral hernia    Intermittent asthma    Moderate major depression (H)    ELIGIO on CPAP     Microalbuminuria    S/P total knee arthroplasty    OA (osteoarthritis) of knee    Anemia due to blood loss, acute    Health Care Home    Essential hypertension    Gastroesophageal reflux disease without esophagitis    Bilateral edema of lower extremity    Osteoarthritis    High risk medication use    Anxiety    Other chronic pain    Controlled substance agreement signed    Edema of lower extremity    Nephrolithiasis    Kidney stone    Tremor    Atypical chest pain    ACS (acute coronary syndrome) (H)    Non-rheumatic mitral valve stenosis    Coronary artery calcification seen on CAT scan    Lumbar radiculopathy    Back muscle spasm    Obesity hypoventilation syndrome (H)    CPAP/BiPAP dependent    Spinal stenosis of lumbosacral region    Bilateral hand pain    Primary osteoarthritis involving multiple joints    Atrial fibrillation with RVR (H)    Acute respiratory failure with hypoxia (H)    Hyponatremia    Atrial fibrillation with rapid ventricular response (H)    Upper back pain    Stage 3b chronic kidney disease (H)    SOB (shortness of breath)    Acute on chronic congestive heart failure, unspecified heart failure type (H)    Lactic acidosis    Aortic valve stenosis    Anticoagulated       Physical Exam   Temp: 97  F (36.1  C) Temp src: Oral BP: (!) 144/88 Pulse: 86   Resp: 20 SpO2: 93 % O2 Device: Nasal cannula Oxygen Delivery: 2 LPM  Vitals:    04/14/24 1229 04/14/24 1657 04/15/24 0607   Weight: 120.2 kg (265 lb) 115.8 kg (255 lb 4.7 oz) 114 kg (251 lb 5.2 oz)     I/O last 3 completed shifts:  In: -   Out: 500 [Urine:500]    Constitutional: Appears stated age, well nourished, NAD.  Neck: Supple. JVD not visualized.   Respiratory: Mildly-labored. Bilateral inspiratory wheezes.   Cardiovascular: IRR, normal S1 and S2. No M/G/R. Bilateral lower extremities with trace edema.   GI: Soft, non-distended, non-tender.  Skin: Warm and dry.  Musculoskeletal/Extremities: Symmetrical movement.  Neurologic: No gross focal  deficits. Alert, awake.  Psychiatric: Affect appropriate. Mentation normal.    Medications   Current Facility-Administered Medications   Medication Dose Route Frequency Provider Last Rate Last Admin    diltiazem (CARDIZEM) 125 mg in sodium chloride 0.9 % 125 mL infusion  5-15 mg/hr Intravenous Continuous Hortencia Ohara MD   Paused at 04/15/24 0032    No anticoagulants IF patient has had acute trauma/surgery or recent intracranial, GI or urinary tract bleeding.    Other DOES NOT GO TO MAR Hortencia Ohara MD        Patient is already receiving anticoagulation with heparin, enoxaparin (LOVENOX), warfarin (COUMADIN)  or other anticoagulant medication   Does not apply Continuous PRN Hortencia Ohara MD         Current Facility-Administered Medications   Medication Dose Route Frequency Provider Last Rate Last Admin    apixaban ANTICOAGULANT (ELIQUIS) tablet 2.5 mg  2.5 mg Oral BID Hortencia Ohara MD   2.5 mg at 04/15/24 0811    atorvastatin (LIPITOR) tablet 20 mg  20 mg Oral QPM Hortencia Ohara MD   20 mg at 04/14/24 2128    azithromycin (ZITHROMAX) 250 mg in sodium chloride 0.9 % 250 mL intermittent infusion  250 mg Intravenous Q24H Hortencia Ohara MD        buPROPion (WELLBUTRIN SR) 12 hr tablet 100 mg  100 mg Oral QPM Hortencia Ohara MD   100 mg at 04/14/24 2128    cefTRIAXone (ROCEPHIN) 2 g vial to attach to  ml bag for ADULTS or NS 50 ml bag for PEDS  2 g Intravenous Q24H Hortencia Ohara MD   2 g at 04/14/24 1543    DULoxetine (CYMBALTA) DR capsule 60 mg  60 mg Oral Daily Hortencia Ohara MD   60 mg at 04/15/24 0811    fexofenadine (ALLEGRA) tablet 180 mg  180 mg Oral QPM Hortencia Ohara MD   180 mg at 04/14/24 2128    [Held by provider] fluticasone-vilanterol (BREO ELLIPTA) 100-25 MCG/ACT inhaler 1 puff  1 puff Inhalation Daily Hortencia Ohara MD        [Held by provider] glimepiride (AMARYL) tablet 4 mg  4 mg Oral BID AC Hortencia Ohara MD         insulin aspart (NovoLOG) injection (RAPID ACTING)   Subcutaneous TID w/meals Tommy Hernandez MD   8 Units at 04/15/24 1342    insulin aspart (NovoLOG) injection (RAPID ACTING)  1-12 Units Subcutaneous Q4H Tommy Hernandez MD   4 Units at 04/15/24 1340    insulin glargine (LANTUS PEN) injection 3 Units  3 Units Subcutaneous QAM AC Tommy Hernandez MD   3 Units at 04/15/24 1135    levalbuterol (XOPENEX) neb solution 1.25 mg  1.25 mg Nebulization 4x Daily Tommy Hernandez MD   1.25 mg at 04/15/24 1138    methylPREDNISolone sodium succinate (solu-MEDROL) injection 62.5 mg  62.5 mg Intravenous Q12H Tommy Hernandez MD        metoprolol tartrate (LOPRESSOR) tablet 75 mg  75 mg Oral BID Hortencia Ohara MD   75 mg at 04/15/24 0954    miconazole (MICATIN) 2 % cream   Topical BID Tommy Hernandez MD   Given at 04/15/24 1127    pantoprazole (PROTONIX) EC tablet 20 mg  20 mg Oral Daily Hortencia Ohara MD   20 mg at 04/15/24 0812    sodium chloride (PF) 0.9% PF flush 3 mL  3 mL Intracatheter Q8H Hortencia Ohara MD   3 mL at 24 2136    sodium chloride (PF) 0.9% PF flush 3 mL  3 mL Intracatheter Q8H Hortencia Ohara MD        [Held by provider] torsemide (DEMADEX) tablet 40 mg  40 mg Oral Daily Hortencia Ohara MD        Vitamin D3 (CHOLECALCIFEROL) tablet 25 mcg  25 mcg Oral Daily Hortencia Ohara MD   25 mcg at 04/15/24 1000       Data   Last 24 hours labs personally reviewed.  Echo:   Recent Results (from the past 4320 hour(s))   Echocardiogram Complete    Narrative    [id number]  [id number]  [id number]  [id number]^LUCRETIA^YARITZA     [facility]  Echocardiography Laboratory  [address] [place] [address]     Name: JOSIE ARRIAGA  MRN: [id number]  : 1933  Study Date: 04/15/2024 10:07 AM  Age: 89 yrs  Gender: Female  Patient Location: [facility]  Reason For Study: Atrial Fibrillation  Ordering Physician: HORTENCIA OHARA  Referring Physician: JEANETH  GUI  Performed By: Julia Espinoza     BSA: 2.2 m2  Height: 67 in  Weight: 251 lb  HR: 75  BP: 112/96 mmHg  ______________________________________________________________________________  Procedure  Complete Echo Adult.  ______________________________________________________________________________  Interpretation Summary     Technically extremely difficult study as previously reported. Last  echocardiogram was 3-1/2 months ago.     Normal LV size and systolic function. Study is inadequate to accurately assess  wall motion. Irregular rhythm is making wall motion analysis is challenging.  However there appears to be mild to moderate hypokinesis of the mid  anteroseptum on contrasted images.  The right ventricle is not well-seen. Probably borderline dilated. RV systolic  function is mildly/mild to moderately reduced.  Aortic valve is difficult to visualize. There is probably mild to moderate  aortic stenosis with a mean gradient of 14 to 18 mmHg. Valve area is likely  underestimated due to reduce stroke-volume index.  There is severe thickening of the mitral valve and mitral calcification noted.  Mean inflow gradient across the mitral valve ranging from 6 to 9 mmHg  consistent with moderate mitral stenosis.  Doppler suggest left to right interatrial shunt.  IVC is normal without pericardial effusion.  ______________________________________________________________________________  Right Ventricle  The right ventricle is normal size. Mildly decreased right ventricular  systolic function.     Atria  The left atrium is mild to moderately dilated. Right atrial size is normal.  Left to right atrial shunt, moderate.     Mitral Valve  Thickened mitral valve posterior leaflet. There is moderate mitral annular  calcification. The mean mitral valve gradient is 6.9 mmHg. The peak mitral  valve gradient is 15.6 mmHg. Calcified mitral apparatus causing mitral  stenosis. There is moderate mitral stenosis.     Aortic Valve  The  aortic valve is not well visualized. The aortic valve is trileaflet with  aortic valve sclerosis. The mean AoV pressure gradient is 17.7 mmHg. The  calculated aortic valve are is 1.1 cm^2. The peak AoV pressure gradient is  31.0 mmHg. Mild to moderate valvular aortic stenosis.     Vessels  The aortic root is normal size. The inferior vena cava was normal in size with  preserved respiratory variability.     Pericardium  There is no pericardial effusion.  ______________________________________________________________________________  MMode/2D Measurements & Calculations  IVSd: 1.1 cm  LVIDd: 4.7 cm  LVIDs: 3.2 cm  LVPWd: 1.0 cm  FS: 30.9 %  LV mass(C)d: 175.8 grams  LV mass(C)dI: 79.0 grams/m2  Ao root diam: 3.0 cm  asc Aorta Diam: 3.0 cm  LVOT diam: 2.1 cm  LVOT area: 3.5 cm2  Ao root diam index Ht(cm/m): 1.8  Ao root diam index BSA (cm/m2): 1.4  Asc Ao diam index BSA (cm/m2): 1.3  Asc Ao diam index Ht(cm/m): 1.8  LA Volume (BP): 89.2 ml     LA Volume Index (BP): 40.0 ml/m2  RWT: 0.43  TAPSE: 1.5 cm     Doppler Measurements & Calculations  MV E max julian: 170.7 cm/sec  MV max PG: 15.6 mmHg  MV mean P.9 mmHg  MV V2 VTI: 39.6 cm  MVA(VTI): 1.7 cm2  MV dec slope: 733.7 cm/sec2  MV dec time: 0.23 sec  Ao V2 max: 278.7 cm/sec  Ao max P.0 mmHg  Ao V2 mean: 195.9 cm/sec  Ao mean P.7 mmHg  Ao V2 VTI: 62.0 cm  MEL(I,D): 1.1 cm2  MEL(V,D): 1.2 cm2  LV V1 max PG: 3.8 mmHg  LV V1 max: 95.3 cm/sec  LV V1 VTI: 19.7 cm  SV(LVOT): 68.4 ml  SI(LVOT): 30.7 ml/m2     PA V2 max: 87.3 cm/sec  PA max PG: 3.0 mmHg  PA acc time: 0.10 sec  AV Julian Ratio (DI): 0.34  MEL Index (cm2/m2): 0.50  E/E' av.7  Lateral E/e': 29.2  Medial E/e': 28.2  RV S Julian: 6.9 cm/sec     ______________________________________________________________________________  Report approved by: Bere Amado 04/15/2024 11:55 AM         Echo Limited   Result Value    LVEF  55-60%    [facility]    [id number]  [id number]  [id number]  [id number]^MAMI^NATALY^SOY      [facility]  Echocardiography Laboratory  [address] [place] [address]     Name: JOSIE ARRIAGA  MRN: [id number]  : 1933  Study Date: 2023 10:15 AM  Age: 89 yrs  Gender: Female  Patient Location: [facility]  Reason For Study: Heart Failure  Ordering Physician: NATALY BOLAÑOS  Referring Physician: GUI ERIC  Performed By: Julia Espinoza     BSA: 2.3 m2  Height: 67 in  Weight: 265 lb  HR: 84  BP: 145/77 mmHg  ______________________________________________________________________________  Procedure  Limited Echo Adult. Technically difficult study. Poor acoustic windows.  ______________________________________________________________________________  Interpretation Summary     This was a technically VERY difficult study. Limited echocardiogram.     Normal LV size and systolic function.  Normal RV size and systolic function.  Mitral apparatus is calcified with probably mild to moderate/moderate mitral  stenosis. Valve is not well-seen. Mean gradient is 5 to 7 mmHg.  Aortic valve is not well-seen. As previously described, moderate aortic  stenosis. Maximally obtained mean gradient is 24 mmHg with calculated aortic  valve area of 1.3 cmÂ .     No significant changes compared to echocardiogram 2 months ago.  ______________________________________________________________________________  Left Ventricle  The left ventricle is normal in size. The visual ejection fraction is 55-60%.     Right Ventricle  The right ventricle is normal in size and function.     Atria  The left atrium is mildly dilated.     Mitral Valve  There is severe mitral annular calcification. The mitral valve is not well  visualized. The mitral valve leaflets appear thickened, but open well. There  is trace to mild mitral regurgitation. The mean mitral valve gradient is 5.5  mmHg. Calcified mitral apparatus causing mitral stenosis. There is mild to  moderate mitral stenosis.     Aortic Valve  The aortic  valve is not well visualized. The mean AoV pressure gradient is  21.7 mmHg. The calculated aortic valve are is 1.3 cm^2. Moderate valvular  aortic stenosis. Increased aortic valve velocity. The peak AoV pressure  gradient is 38.0 mmHg.     Vessels  The aortic root is normal size. The inferior vena cava was normal in size with  preserved respiratory variability.     Pericardium  There is no pericardial effusion.  ______________________________________________________________________________  MMode/2D Measurements & Calculations  asc Aorta Diam: 3.4 cm  LVOT diam: 2.1 cm  LVOT area: 3.5 cm2  Asc Ao diam index BSA (cm/m2): 1.5  Asc Ao diam index Ht(cm/m): 2.0  LA Volume (BP): 91.7 ml     LA Volume Index (BP): 40.2 ml/m2     Doppler Measurements & Calculations  MV max P.4 mmHg  MV mean P.5 mmHg  MV V2 VTI: 41.0 cm  MVA(VTI): 2.1 cm2  Ao V2 max: 309.0 cm/sec  Ao max P.0 mmHg  Ao V2 mean: 218.0 cm/sec  Ao mean P.7 mmHg  Ao V2 VTI: 68.5 cm  MEL(I,D): 1.3 cm2  MEL(V,D): 1.2 cm2  LV V1 max P.7 mmHg  LV V1 max: 108.0 cm/sec  LV V1 VTI: 24.6 cm  SV(LVOT): 85.6 ml  SI(LVOT): 37.6 ml/m2  PA acc time: 0.10 sec  AV Julian Ratio (DI): 0.35  MEL Index (cm2/m2): 0.55     ______________________________________________________________________________  Report approved by: Bere Amado 2023 03:31 PM

## 2024-04-15 NOTE — PROGRESS NOTES
Clinic Care Coordination Contact  Ambulatory Care Coordination to Inpatient Care Management   Hand-In Communication    Date:  April 15, 2024  Name: Moira Andre is enrolled in Ambulatory Care Coordination program and I am the Lead Care Coordinator.  CC Contact Information: Epic InLatest Medicalsket + phone  Payor Source: Payor: [facility] / Plan: [employer] MEDICARE ADVANTAGE / Product Type: HMO /   Current services in place:     Please see the CC Snaphot and Care Management Flowsheets for specific details of this Moira Andre care plan.   Additional details/specific concerns r/t this admission:    No additional concerns at this time      I will follow this admission in Epic. Please feel free to contact me with questions or for further collaboration in discharge planning.    Maria L Mckeon,  [facility]  Clinic Care Coordinator  [facility]  [phone]  [email or web address]

## 2024-04-15 NOTE — PLAN OF CARE
Goal Outcome Evaluation:      Plan of Care Reviewed With: patient      Patient alert, calls for SBA with walker. [place], sl poor activity intolerance. Incontinent of urine, purewick in place. Up to chair x3 ,encourage I/S. Nebs changed to Xopenex, Continues in Afib but controlled 60-80. Cont of metoprolol and cards signed off. Echo completed.  IV solumedrol, antibiotics has freq loose cough, occ able to get some sputum up.Rec'd order for Robitussin. SW working on TCU placement.

## 2024-04-15 NOTE — CONSULTS
Care Management Initial Consult    General Information  Assessment completed with: Patient,    Type of CM/SW Visit: Initial Assessment    Primary Care Provider verified and updated as needed:     Readmission within the last 30 days:        Reason for Consult: discharge planning  Advance Care Planning:            Communication Assessment  Patient's communication style: spoken language (English or Bilingual)    Hearing Difficulty or Deaf: yes   Wear Glasses or Blind: yes    Cognitive  Cognitive/Neuro/Behavioral: WDL  Level of Consciousness: alert  Arousal Level: opens eyes spontaneously  Orientation: oriented x 4  Mood/Behavior: calm, cooperative  Best Language: 0 - No aphasia  Speech: clear    Living Environment:   People in home: alone     Current living Arrangements: apartment      Able to return to prior arrangements:         Family/Social Support:  Care provided by: self  Provides care for: no one  Marital Status:              Description of Support System:           Current Resources:   Patient receiving home care services: No     Community Resources: None  Equipment currently used at home: walker, rolling (FWW)  Supplies currently used at home: None    Employment/Financial:  Employment Status: retired        Financial Concerns:             Does the patient's insurance plan have a 3 day qualifying hospital stay waiver?  No    Lifestyle & Psychosocial Needs:  Social Determinants of Health     Food Insecurity: Low Risk  (10/26/2023)    Food Insecurity     Within the past 12 months, did you worry that your food would run out before you got money to buy more?: No     Within the past 12 months, did the food you bought just not last and you didn t have money to get more?: No   Depression: Not at risk (1/11/2024)    PHQ-2     PHQ-2 Score: 0   Housing Stability: Low Risk  (10/26/2023)    Housing Stability     Do you have housing? : Yes     Are you worried about losing your housing?: No   Tobacco Use: Medium Risk  (2/22/2024)    Patient History     Smoking Tobacco Use: Former     Smokeless Tobacco Use: Never     Passive Exposure: Not on file   Financial Resource Strain: Low Risk  (10/26/2023)    Financial Resource Strain     Within the past 12 months, have you or your family members you live with been unable to get utilities (heat, electricity) when it was really needed?: No   Alcohol Use: Not on file   Transportation Needs: Low Risk  (10/26/2023)    Transportation Needs     Within the past 12 months, has lack of transportation kept you from medical appointments, getting your medicines, non-medical meetings or appointments, work, or from getting things that you need?: No   Physical Activity: Not on file   Interpersonal Safety: Low Risk  (1/11/2024)    Interpersonal Safety     Do you feel physically and emotionally safe where you currently live?: Yes     Within the past 12 months, have you been hit, slapped, kicked or otherwise physically hurt by someone?: No     Within the past 12 months, have you been humiliated or emotionally abused in other ways by your partner or ex-partner?: No   Stress: No Stress Concern Present (6/10/2021)    [employer] - Occupational Stress Questionnaire     Feeling of Stress : Not at all   Social Connections: Unknown (6/10/2021)    Social Connection and Isolation Panel [NHANES]     Frequency of Communication with Friends and Family: Not on file     Frequency of Social Gatherings with Friends and Family: Not on file     Attends Quaker Services: Not on file     Active Member of Clubs or Organizations: Not on file     Attends Club or Organization Meetings: Not on file     Marital Status:    Health Literacy: Not on file       Functional Status:  Prior to admission patient needed assistance:              Mental Health Status:          Chemical Dependency Status:                Values/Beliefs:  Spiritual, Cultural Beliefs, Quaker Practices, Values that affect  care:                 Additional Information:  Pt is a 89 year old female who was admitted to the hospital with concerns of shortness of breath per  note on 4/14.    Writer spoke with PT who states pt is needing TCU.     Writer met with pt at bedside. Introduced self and role. Pt states she lives at home alone in an apartment. She states that she lives alone in an apartment. She states she does have  coming in to help her clean but otherwise is independent in ADL's. Pt states she is not using home care. Pt states she does use a walker at home. Discussed therapy recommendation. Pt is agreeable to TCU. Discussed [facility] list. She states she was at [facility] in the past and would like to go there. She is agreeable to referral to [facility] as other referrals but preferring [facility]. Pt states that she does owe [facility] 200 dollars and she is able to pay that upon arrival. She states she had not paid that prior to hospitalization as she had a lot going on. Pt states no further questions or concerns at this time.     Writer sent referrals.     SAVAGE Colon  Social Work  [facility]
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
## 2024-04-15 NOTE — PROGRESS NOTES
"[facility]    Hospitalist Progress Note    Assessment & Plan   Date of Admission:  4/14/2024        Assessment & Plan  Moira Andre is a 89 year old female admitted on 4/14/2024. She has h/o HFpEF, Afib (eliquis), CAD (Stents 2007), DMII (glimepiride), ELIGIO, [place], Ventral hernia, COPD/Asthma,      She presents with complaints of escalating wheezing/HENDERSON and cough .   Past 3 days or so.   SHe came from her apartment where she lives independently with her cat.   EMS noted O2sats in 80's, gave her duenebs X 2 on way to ER.  In ER, , started diltiazem gtt.   BP stable.        She presented similarly in 12/2023 -- but this on this presentation,  she also has a cough and wheezing.   Denies med non compliance.  No chest pain.  No increasing edema.  No GI complaints.           Afib RVR   (During 11/2023 admission, metoprolol increased to 75 bid)  Anticoagulated  HFpEF, decompensated  BNP 4000   Lactic Acidosis   2.7 > 2.0 ...  Elevated troponin (35 -  this is \"baseline\" per review of chart)--> 42, 33  Aortic Stenosis, moderate    Mitral Valve Stenosis, mild, with severe calcification    CAD (OM1 and diagonal stents 2007, angiograms 2013, 2017 showed patency)  -initiated on dilt gtt and iv/po metoprolol   -cr up with iv lasix X 2 on admission   -K and Mg at goal  - HR controlled on dilt gtt, paused am 4/15  -lactic acid normalized  -flat troponin trend  -driven/ppt'd by copd exacerbation likely      Plan;   - on diltiazem gtt @ 10-15---> held given HR 50s.   -- continue pta metoprolol, eliquis, atorvastatin  -hold diuretics for today   - strict I&O  - Cardiology consulted  - Recheck Echo (last done 12/23, EF 55%, moderate Aortic stenosis, mild-mod MV stenosis)  - tele   -cardiology follow up  - Telemetry  - may need zio patch at discharge      COPD/Asthma  Wheezing  Cough     - Covid/Influenza/RSV screening (-)  -dry cough, productive initially, 4-5 days PTA. Some crackles bibasilar. Chf, " "possible pneumonia  - 4/14/24 portable CXR is poor quality, likely shows CHF.     - procalcitonin 0.12  - due to new cough and hypoxia, empirically started Abx (ceftriaxone/azithromycin) for possible CAP on admission contributing  - scheduled duonebs -- re assess daily and change to prn when appropriate.   - solumedrol IV  (hold pta Wixela inhaler during steroid therapy)  - assess for tapering daily     - of note, she was discharged 12/2023 with supplemental O2.  She states it \"burned her nose\" so she never really used it.  The tank was too heavy to be used with ambulation  Called oxygen company to cancel \"They never picked it up, still there\".     -briefly needed bipap night of admission    4/15, Feeling better, less sob. Still with bilateral wheezing. On 4L NC      Plan-  -follow oxygen needs  - IS, mobilize   -abx for now  - change nebs to  Xopenex qid for now  -cont solumedrol bid for now       CKD3b  Creatinine is 1.63 - at her baseline.   -CR 1.8 am 4/15 after lasix 40mg IV X 2 day of admission  -on Torsemide 20mg bid.   Follow BMP  -hold lasix and reeval  - may be able to transition to PTA Torsemide tomorrow     Nephrolithiasis  - per 11/2023 CT, she has 1 cm stone in R renal pelvis, nonobstructive  - no  complaints  - check UA if  complaints arise.  -  No elective intervention planned (decided per her PCP and urology), due to multiple co morbidities.            DMII  A1C = 7.7 2/22/2024  Hyperglycemia  - 300's at presentation.... starting steroid therapy         - pta glimepiride - dose increased by admitting hospitalist from 2mg bid to 4 mg bid while on solumedrol:       Plan'   -hold glimepiride in hospital  - reduce solumedrol to every day and monitor wheezing  -follow BS qid  -add prandial aspart in hospital- adjust as needed   -start lantus and adjust as needed  - high intensity ISS (anticipate elevated sugars and resistance with steroid treatment)  -dm diet  - pcp follow up      Large ventral " "hernia  - affects her mobility and accessory muscle use  - not surgical candidate  -NT, no obstructive sxs    Bilateral Knee replacements   - affects mobility   -uses walker at home   -PT eval     ELIGIO  Her cpap machine is broken, has appt to fix  Will see if RT can help resume her CPAP while here when appropriate.      PT/OT/CC consults to help with dispo planning.       Tinea-  Breast and groin folds  - miconazole cream to rash bid.               Diet: NPO for Medical/Clinical Reasons Except for: Meds, Ice Chips    DVT Prophylaxis: DOAC  Charles Catheter: Not present  Lines: None     Cardiac Monitoring: ACTIVE order. Indication: Tachyarrhythmias, acute (48 hours)  Code Status: Full Code          Clinically Significant Risk Factors Present on Admission[]Expand by Default               # Drug Induced Coagulation Defect: home medication list includes an anticoagulant medication    # Hypertension: Noted on problem list  # Acute heart failure with preserved ejection fraction: heart failure noted on problem list, last echo with EF >50%, and receiving IV diuretics    # DMII: A1C = 7.7 % (Ref range: 0.0 - 5.6 %) within past 6 months    # Severe Obesity: Estimated body mass index is 41.5 kg/m  as calculated from the following:    Height as of this encounter: 1.702 m (5' 7\").    Weight as of this encounter: 120.2 kg (265 lb).       # Financial/Environmental Concerns:    # Asthma: noted on problem list               Disposition Plan     Medically Ready for Discharge: Anticipated in 1-2 Days  HR controlled.   Cardiology consult.  Wheezing controlled.       PTOT, SW eval. Likely need tcu, uses walker at home.   Pcp follow up      Moderate complexity, >35 on care coord with rn, chart review, charting., exam, , counseling patient    Tommy Hernandez MD, MD  Text Page  (7am to 6pm)  Interval History   HR controlled  Dry cough  No new issues.   Less sob today  Briefly on bipap overnight  Off dilt gtt for HR 50s this " am  Remains in afib  Still with freq cough      -Data reviewed today: I reviewed all new labs and imaging results over the last 24 hours. I personally reviewed labs and imaging since admission    Physical Exam   Temp: (!) 96.6  F (35.9  C) Temp src: Axillary BP: 120/78 Pulse: 78   Resp: 14 SpO2: 96 % O2 Device: Nasal cannula Oxygen Delivery: 3 LPM  Vitals:    04/14/24 1229 04/14/24 1657 04/15/24 0607   Weight: 120.2 kg (265 lb) 115.8 kg (255 lb 4.7 oz) 114 kg (251 lb 5.2 oz)     Vital Signs with Ranges  Temp:  [96.6  F (35.9  C)-98.9  F (37.2  C)] 96.6  F (35.9  C)  Pulse:  [] 78  Resp:  [12-48] 14  BP: (105-148)/() 120/78  SpO2:  [88 %-97 %] 96 %  I/O last 3 completed shifts:  In: -   Out: 500 [Urine:500]    Constitutional: Up in bed, nad  Neck: thick. Jvp seems wnl  Respiratory: Bilateral exp wheeze diffuse  Bibasilar crackles  Freq dry cough  Cardiovascular: irreg irreg rhythm. 2/6 systolic murmur RUSB  GI: ventral hernia. NT, nd  Skin/Integumen: rash bilateral groin and breast skin folds  Neuro: nl speech and mentation  Psych: nl affect    Medications   Current Facility-Administered Medications   Medication Dose Route Frequency Provider Last Rate Last Admin    diltiazem (CARDIZEM) 125 mg in sodium chloride 0.9 % 125 mL infusion  5-15 mg/hr Intravenous Continuous Hortencia Ohara MD   Paused at 04/15/24 0032    No anticoagulants IF patient has had acute trauma/surgery or recent intracranial, GI or urinary tract bleeding.    Other DOES NOT GO TO Hortencia Painting MD        Patient is already receiving anticoagulation with heparin, enoxaparin (LOVENOX), warfarin (COUMADIN)  or other anticoagulant medication   Does not apply Continuous PRN Hortencia Ohara MD         Current Facility-Administered Medications   Medication Dose Route Frequency Provider Last Rate Last Admin    apixaban ANTICOAGULANT (ELIQUIS) tablet 2.5 mg  2.5 mg Oral BID Hortencia Ohara MD   2.5 mg at 04/15/24 0811     atorvastatin (LIPITOR) tablet 20 mg  20 mg Oral QPM Hortencia Ohara MD   20 mg at 04/14/24 2128    azithromycin (ZITHROMAX) 250 mg in sodium chloride 0.9 % 250 mL intermittent infusion  250 mg Intravenous Q24H Hortencia Ohara MD        buPROPion (WELLBUTRIN SR) 12 hr tablet 100 mg  100 mg Oral QPM Hortencia Ohara MD   100 mg at 04/14/24 2128    cefTRIAXone (ROCEPHIN) 2 g vial to attach to  ml bag for ADULTS or NS 50 ml bag for PEDS  2 g Intravenous Q24H Hortencia Ohara MD   2 g at 04/14/24 1543    DULoxetine (CYMBALTA) DR capsule 60 mg  60 mg Oral Daily Hortencia Ohara MD   60 mg at 04/15/24 0811    fexofenadine (ALLEGRA) tablet 180 mg  180 mg Oral QPM Hortencia Ohara MD   180 mg at 04/14/24 2128    [Held by provider] fluticasone-vilanterol (BREO ELLIPTA) 100-25 MCG/ACT inhaler 1 puff  1 puff Inhalation Daily Hortencia Ohara MD        [Held by provider] glimepiride (AMARYL) tablet 4 mg  4 mg Oral BID  Hortencia Ohara MD        insulin aspart (NovoLOG) injection (RAPID ACTING)   Subcutaneous TID w/meals Tommy Hernandez MD        insulin aspart (NovoLOG) injection (RAPID ACTING)  1-10 Units Subcutaneous TID  Hortencia Ohara MD        insulin aspart (NovoLOG) injection (RAPID ACTING)  1-7 Units Subcutaneous At Bedtime Hortencia Ohara MD   4 Units at 04/14/24 2210    insulin glargine (LANTUS PEN) injection 3 Units  3 Units Subcutaneous QAM  Tommy Hernandez MD        ipratropium - albuterol 0.5 mg/2.5 mg/3 mL (DUONEB) neb solution 3 mL  3 mL Nebulization Q4H WA Hortencia Ohara MD   3 mL at 04/14/24 1544    [START ON 4/16/2024] methylPREDNISolone sodium succinate (solu-MEDROL) injection 62.5 mg  62.5 mg Intravenous Q24H Tommy Hernandez MD        metoprolol tartrate (LOPRESSOR) tablet 75 mg  75 mg Oral BID Hortencia Ohara MD   75 mg at 04/14/24 2128    miconazole (MICATIN) 2 % cream   Topical BID Tommy Hernandez MD        pantoprazole  (PROTONIX) EC tablet 20 mg  20 mg Oral Daily Hortencia Ohara MD   20 mg at 04/15/24 0812    sodium chloride (PF) 0.9% PF flush 3 mL  3 mL Intracatheter Q8H Hortencia Ohara MD   3 mL at 04/14/24 2136    sodium chloride (PF) 0.9% PF flush 3 mL  3 mL Intracatheter Q8H Hortencia Ohara MD        [Held by provider] torsemide (DEMADEX) tablet 40 mg  40 mg Oral Daily Hortencia Ohara MD        Vitamin D3 (CHOLECALCIFEROL) tablet 25 mcg  25 mcg Oral Daily Hortencia Ohara MD           Data   Recent Labs   Lab 04/15/24  0531 04/15/24  0210 04/14/24  2209 04/14/24  1744 04/14/24  1231   WBC 7.3  --   --   --  10.0   HGB 12.9  --   --   --  13.9   MCV 83  --   --   --  82     --   --   --  264     --   --   --  133*   POTASSIUM 4.9  --   --   --  4.6   CHLORIDE 98  --   --   --  96*   CO2 25  --   --   --  24   BUN 36.3*  --   --   --  30.6*   CR 1.84*  --   --   --  1.63*   ANIONGAP 14  --   --   --  13   TELLY 9.0  --   --   --  9.3   * 294* 287*   < > 321*    < > = values in this interval not displayed.       Imaging:   Recent Results (from the past 24 hour(s))   XR Chest Port 1 View    Narrative    EXAM: XR CHEST PORT 1 VIEW  LOCATION: [facility]  DATE: 4/14/2024    INDICATION: SHORTNESS OF BREATH  COMPARISON: 12/27/2023 and 11/25/2023      Impression    IMPRESSION: Markedly shallow rotation and mild patient rotation to the right. Mild mid and lower lung interstitial edema. No focal consolidation. Minimal pleural fluid and/or thickening inferior hemithoraces. Mild generalized cardiac enlargement and   borderline pulmonary venous congestion.
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
## 2024-04-15 NOTE — PLAN OF CARE
Neuro: A&Ox4  Tele/Cardiac: A-fib CVR  Resp: Bipap worn overnight, 3L NC  Activity: did not get up overnight  Pain: denies  Drips/IV: dilt drip paused due to low HR  GI/: purewick in place  Skin: reddened under R breast and groin area.  Diet: Diet: NPO for Medical/Clinical Reasons Except for: Meds, Ice Chips     Test/Procedures: n/a  Plan: possible EP consult to consider starting amio. PT/OT/SW consults

## 2024-04-16 ENCOUNTER — APPOINTMENT (OUTPATIENT)
Dept: GENERAL RADIOLOGY | Facility: CLINIC | Age: 89
DRG: 291 | End: 2024-04-16
Attending: INTERNAL MEDICINE
Payer: COMMERCIAL

## 2024-04-16 LAB
ALBUMIN UR-MCNC: 70 MG/DL
ANION GAP SERPL CALCULATED.3IONS-SCNC: 20 MMOL/L (ref 7–15)
ANION GAP SERPL CALCULATED.3IONS-SCNC: 20 MMOL/L (ref 7–15)
APPEARANCE UR: ABNORMAL
BILIRUB UR QL STRIP: NEGATIVE
BUN SERPL-MCNC: 58.4 MG/DL (ref 8–23)
BUN SERPL-MCNC: 61.8 MG/DL (ref 8–23)
CALCIUM SERPL-MCNC: 9.4 MG/DL (ref 8.2–9.6)
CALCIUM SERPL-MCNC: 9.4 MG/DL (ref 8.2–9.6)
CHLORIDE SERPL-SCNC: 98 MMOL/L (ref 98–107)
CHLORIDE SERPL-SCNC: 98 MMOL/L (ref 98–107)
COLOR UR AUTO: ABNORMAL
CREAT SERPL-MCNC: 1.98 MG/DL (ref 0.51–0.95)
CREAT SERPL-MCNC: 2.02 MG/DL (ref 0.51–0.95)
CREAT UR-MCNC: 90.5 MG/DL
DEPRECATED HCO3 PLAS-SCNC: 17 MMOL/L (ref 22–29)
DEPRECATED HCO3 PLAS-SCNC: 19 MMOL/L (ref 22–29)
EGFRCR SERPLBLD CKD-EPI 2021: 23 ML/MIN/1.73M2
EGFRCR SERPLBLD CKD-EPI 2021: 23 ML/MIN/1.73M2
ERYTHROCYTE [DISTWIDTH] IN BLOOD BY AUTOMATED COUNT: 16.9 % (ref 10–15)
FRACT EXCRET NA UR+SERPL-RTO: 0.4 %
GLUCOSE BLDC GLUCOMTR-MCNC: 172 MG/DL (ref 70–99)
GLUCOSE BLDC GLUCOMTR-MCNC: 198 MG/DL (ref 70–99)
GLUCOSE BLDC GLUCOMTR-MCNC: 215 MG/DL (ref 70–99)
GLUCOSE BLDC GLUCOMTR-MCNC: 243 MG/DL (ref 70–99)
GLUCOSE BLDC GLUCOMTR-MCNC: 245 MG/DL (ref 70–99)
GLUCOSE SERPL-MCNC: 235 MG/DL (ref 70–99)
GLUCOSE SERPL-MCNC: 240 MG/DL (ref 70–99)
GLUCOSE UR STRIP-MCNC: NEGATIVE MG/DL
HCT VFR BLD AUTO: 42.6 % (ref 35–47)
HGB BLD-MCNC: 12.8 G/DL (ref 11.7–15.7)
HGB UR QL STRIP: ABNORMAL
KETONES UR STRIP-MCNC: NEGATIVE MG/DL
LACTATE SERPL-SCNC: 2.6 MMOL/L (ref 0.7–2)
LEUKOCYTE ESTERASE UR QL STRIP: ABNORMAL
MCH RBC QN AUTO: 25 PG (ref 26.5–33)
MCHC RBC AUTO-ENTMCNC: 30 G/DL (ref 31.5–36.5)
MCV RBC AUTO: 83 FL (ref 78–100)
NITRATE UR QL: NEGATIVE
NT-PROBNP SERPL-MCNC: 8593 PG/ML (ref 0–1800)
PH UR STRIP: 5.5 [PH] (ref 5–7)
PLATELET # BLD AUTO: 308 10E3/UL (ref 150–450)
POTASSIUM SERPL-SCNC: 4.4 MMOL/L (ref 3.4–5.3)
POTASSIUM SERPL-SCNC: 4.5 MMOL/L (ref 3.4–5.3)
RBC # BLD AUTO: 5.12 10E6/UL (ref 3.8–5.2)
RBC URINE: >182 /HPF
SODIUM SERPL-SCNC: 135 MMOL/L (ref 135–145)
SODIUM SERPL-SCNC: 137 MMOL/L (ref 135–145)
SODIUM UR-SCNC: 22 MMOL/L
SP GR UR STRIP: 1.02 (ref 1–1.03)
SQUAMOUS EPITHELIAL: 18 /HPF
UROBILINOGEN UR STRIP-MCNC: NORMAL MG/DL
WBC # BLD AUTO: 15.7 10E3/UL (ref 4–11)
WBC URINE: >182 /HPF

## 2024-04-16 PROCEDURE — 250N000011 HC RX IP 250 OP 636: Performed by: INTERNAL MEDICINE

## 2024-04-16 PROCEDURE — 82565 ASSAY OF CREATININE: CPT | Performed by: INTERNAL MEDICINE

## 2024-04-16 PROCEDURE — 258N000003 HC RX IP 258 OP 636: Performed by: INTERNAL MEDICINE

## 2024-04-16 PROCEDURE — 999N000157 HC STATISTIC RCP TIME EA 10 MIN

## 2024-04-16 PROCEDURE — 250N000009 HC RX 250: Performed by: INTERNAL MEDICINE

## 2024-04-16 PROCEDURE — 99207 PR CDG-CUT & PASTE-POTENTIAL IMPACT ON LEVEL: CPT | Performed by: INTERNAL MEDICINE

## 2024-04-16 PROCEDURE — 250N000013 HC RX MED GY IP 250 OP 250 PS 637: Performed by: HOSPITALIST

## 2024-04-16 PROCEDURE — 250N000012 HC RX MED GY IP 250 OP 636 PS 637: Performed by: INTERNAL MEDICINE

## 2024-04-16 PROCEDURE — 83880 ASSAY OF NATRIURETIC PEPTIDE: CPT | Performed by: INTERNAL MEDICINE

## 2024-04-16 PROCEDURE — 81001 URINALYSIS AUTO W/SCOPE: CPT | Performed by: INTERNAL MEDICINE

## 2024-04-16 PROCEDURE — 83605 ASSAY OF LACTIC ACID: CPT | Performed by: INTERNAL MEDICINE

## 2024-04-16 PROCEDURE — 36415 COLL VENOUS BLD VENIPUNCTURE: CPT | Performed by: INTERNAL MEDICINE

## 2024-04-16 PROCEDURE — 87086 URINE CULTURE/COLONY COUNT: CPT | Performed by: INTERNAL MEDICINE

## 2024-04-16 PROCEDURE — 85027 COMPLETE CBC AUTOMATED: CPT | Performed by: INTERNAL MEDICINE

## 2024-04-16 PROCEDURE — 99233 SBSQ HOSP IP/OBS HIGH 50: CPT | Performed by: INTERNAL MEDICINE

## 2024-04-16 PROCEDURE — 82570 ASSAY OF URINE CREATININE: CPT | Performed by: INTERNAL MEDICINE

## 2024-04-16 PROCEDURE — 250N000013 HC RX MED GY IP 250 OP 250 PS 637: Performed by: INTERNAL MEDICINE

## 2024-04-16 PROCEDURE — 71046 X-RAY EXAM CHEST 2 VIEWS: CPT

## 2024-04-16 PROCEDURE — 210N000001 HC R&B IMCU HEART CARE

## 2024-04-16 PROCEDURE — 94640 AIRWAY INHALATION TREATMENT: CPT | Mod: 76

## 2024-04-16 PROCEDURE — 94640 AIRWAY INHALATION TREATMENT: CPT

## 2024-04-16 RX ORDER — LEVALBUTEROL INHALATION SOLUTION 1.25 MG/3ML
1.25 SOLUTION RESPIRATORY (INHALATION)
Status: DISCONTINUED | OUTPATIENT
Start: 2024-04-16 | End: 2024-04-22 | Stop reason: HOSPADM

## 2024-04-16 RX ORDER — METHYLPREDNISOLONE SODIUM SUCCINATE 40 MG/ML
40 INJECTION, POWDER, LYOPHILIZED, FOR SOLUTION INTRAMUSCULAR; INTRAVENOUS EVERY 12 HOURS
Status: DISCONTINUED | OUTPATIENT
Start: 2024-04-16 | End: 2024-04-18

## 2024-04-16 RX ORDER — SODIUM CHLORIDE 9 MG/ML
INJECTION, SOLUTION INTRAVENOUS CONTINUOUS
Status: DISCONTINUED | OUTPATIENT
Start: 2024-04-16 | End: 2024-04-16

## 2024-04-16 RX ORDER — PREDNISONE 20 MG/1
40 TABLET ORAL DAILY
Status: DISCONTINUED | OUTPATIENT
Start: 2024-04-17 | End: 2024-04-16

## 2024-04-16 RX ORDER — LEVALBUTEROL 1.25 MG/.5ML
1.25 SOLUTION, CONCENTRATE RESPIRATORY (INHALATION) EVERY 6 HOURS PRN
Status: DISCONTINUED | OUTPATIENT
Start: 2024-04-16 | End: 2024-04-22 | Stop reason: HOSPADM

## 2024-04-16 RX ORDER — GUAIFENESIN 200 MG/10ML
200 LIQUID ORAL EVERY 6 HOURS PRN
Status: DISCONTINUED | OUTPATIENT
Start: 2024-04-16 | End: 2024-04-22 | Stop reason: HOSPADM

## 2024-04-16 RX ORDER — PREDNISONE 10 MG/1
40 TABLET ORAL DAILY
Status: DISCONTINUED | OUTPATIENT
Start: 2024-04-17 | End: 2024-04-16

## 2024-04-16 RX ORDER — FUROSEMIDE 10 MG/ML
40 INJECTION INTRAMUSCULAR; INTRAVENOUS EVERY 8 HOURS
Status: COMPLETED | OUTPATIENT
Start: 2024-04-16 | End: 2024-04-17

## 2024-04-16 RX ADMIN — LEVALBUTEROL HYDROCHLORIDE 1.25 MG: 1.25 SOLUTION RESPIRATORY (INHALATION) at 19:12

## 2024-04-16 RX ADMIN — METHYLPREDNISOLONE SODIUM SUCCINATE 40 MG: 40 INJECTION, POWDER, FOR SOLUTION INTRAMUSCULAR; INTRAVENOUS at 18:39

## 2024-04-16 RX ADMIN — GUAIFENESIN 200 MG: 200 SOLUTION ORAL at 22:25

## 2024-04-16 RX ADMIN — METHYLPREDNISOLONE SODIUM SUCCINATE 62.5 MG: 125 INJECTION, POWDER, FOR SOLUTION INTRAMUSCULAR; INTRAVENOUS at 05:27

## 2024-04-16 RX ADMIN — APIXABAN 2.5 MG: 2.5 TABLET, FILM COATED ORAL at 08:50

## 2024-04-16 RX ADMIN — Medication 25 MCG: at 08:50

## 2024-04-16 RX ADMIN — LEVALBUTEROL HYDROCHLORIDE 1.25 MG: 1.25 SOLUTION RESPIRATORY (INHALATION) at 08:16

## 2024-04-16 RX ADMIN — CEFTRIAXONE SODIUM 2 G: 2 INJECTION, POWDER, FOR SOLUTION INTRAMUSCULAR; INTRAVENOUS at 15:17

## 2024-04-16 RX ADMIN — LEVALBUTEROL HYDROCHLORIDE 1.25 MG: 1.25 SOLUTION RESPIRATORY (INHALATION) at 11:34

## 2024-04-16 RX ADMIN — METOPROLOL TARTRATE 75 MG: 50 TABLET, FILM COATED ORAL at 21:02

## 2024-04-16 RX ADMIN — GUAIFENESIN 200 MG: 200 SOLUTION ORAL at 15:16

## 2024-04-16 RX ADMIN — SENNOSIDES AND DOCUSATE SODIUM 1 TABLET: 50; 8.6 TABLET ORAL at 21:02

## 2024-04-16 RX ADMIN — INSULIN HUMAN 3 UNITS: 100 INJECTION, SUSPENSION SUBCUTANEOUS at 18:32

## 2024-04-16 RX ADMIN — MICONAZOLE NITRATE: 20 CREAM TOPICAL at 09:05

## 2024-04-16 RX ADMIN — ACETAMINOPHEN 650 MG: 325 TABLET, FILM COATED ORAL at 04:29

## 2024-04-16 RX ADMIN — GUAIFENESIN AND DEXTROMETHORPHAN 10 ML: 100; 10 SYRUP ORAL at 04:29

## 2024-04-16 RX ADMIN — APIXABAN 2.5 MG: 2.5 TABLET, FILM COATED ORAL at 21:02

## 2024-04-16 RX ADMIN — FUROSEMIDE 40 MG: 10 INJECTION, SOLUTION INTRAMUSCULAR; INTRAVENOUS at 18:28

## 2024-04-16 RX ADMIN — MICONAZOLE NITRATE: 20 CREAM TOPICAL at 21:06

## 2024-04-16 RX ADMIN — BUPROPION HYDROCHLORIDE 100 MG: 100 TABLET, FILM COATED, EXTENDED RELEASE ORAL at 21:02

## 2024-04-16 RX ADMIN — GUAIFENESIN AND DEXTROMETHORPHAN 10 ML: 100; 10 SYRUP ORAL at 08:51

## 2024-04-16 RX ADMIN — FEXOFENADINE HCL 180 MG: 180 TABLET ORAL at 21:02

## 2024-04-16 RX ADMIN — SODIUM CHLORIDE: 9 INJECTION, SOLUTION INTRAVENOUS at 07:57

## 2024-04-16 RX ADMIN — AZITHROMYCIN MONOHYDRATE 250 MG: 500 INJECTION, POWDER, LYOPHILIZED, FOR SOLUTION INTRAVENOUS at 16:58

## 2024-04-16 RX ADMIN — ACETAMINOPHEN 650 MG: 325 TABLET, FILM COATED ORAL at 21:02

## 2024-04-16 RX ADMIN — ACETAMINOPHEN 650 MG: 325 TABLET, FILM COATED ORAL at 08:51

## 2024-04-16 RX ADMIN — METOPROLOL TARTRATE 75 MG: 50 TABLET, FILM COATED ORAL at 08:48

## 2024-04-16 RX ADMIN — LEVALBUTEROL HYDROCHLORIDE 1.25 MG: 1.25 SOLUTION RESPIRATORY (INHALATION) at 15:45

## 2024-04-16 RX ADMIN — GUAIFENESIN AND DEXTROMETHORPHAN 10 ML: 100; 10 SYRUP ORAL at 00:10

## 2024-04-16 RX ADMIN — PANTOPRAZOLE SODIUM 20 MG: 20 TABLET, DELAYED RELEASE ORAL at 08:50

## 2024-04-16 RX ADMIN — DULOXETINE HYDROCHLORIDE 60 MG: 60 CAPSULE, DELAYED RELEASE ORAL at 08:50

## 2024-04-16 RX ADMIN — ATORVASTATIN CALCIUM 20 MG: 20 TABLET, FILM COATED ORAL at 21:02

## 2024-04-16 ASSESSMENT — ACTIVITIES OF DAILY LIVING (ADL)
ADLS_ACUITY_SCORE: 40
ADLS_ACUITY_SCORE: 40
ADLS_ACUITY_SCORE: 36
ADLS_ACUITY_SCORE: 40
ADLS_ACUITY_SCORE: 40
ADLS_ACUITY_SCORE: 36
ADLS_ACUITY_SCORE: 40
ADLS_ACUITY_SCORE: 36
ADLS_ACUITY_SCORE: 41
ADLS_ACUITY_SCORE: 41
ADLS_ACUITY_SCORE: 36
ADLS_ACUITY_SCORE: 40
ADLS_ACUITY_SCORE: 36
ADLS_ACUITY_SCORE: 40
ADLS_ACUITY_SCORE: 36
ADLS_ACUITY_SCORE: 40
ADLS_ACUITY_SCORE: 36

## 2024-04-16 NOTE — PLAN OF CARE
Goal Outcome Evaluation:  A&O x4, VSS, sat 90s  at 4L/NC. Unable to wean down 02, Dyspneic with activities, non -productive dry cough decreased with prn Robitussin. Encouraged use of IS. Tele Afib w/BBB & PVC. Abd pain relieved with prn tylenol. Denies CP/N/V. Up with SBA/GB/W. Plans for TCU at discharge pending improved clinical status.

## 2024-04-16 NOTE — PLAN OF CARE
Goal Outcome Evaluation:      Plan of Care Reviewed With: patient      Patient alert SBA with walker gait belt. Pt feeling worse today, intermittent cough causing discomfort and fatigue. Robitussin and Tylenol with some improvement. Sat's on 2 l mid 90's. Scattered exp wheezing at times. Scheduled nebs. Urine dark brown. Creat up. IV fluid ordered, not much change in repeat BMP. UA and CXR results seen by Dr Hernandez.. IV fld stopped and Lasix ordered. BG remain elevated, increase carb count insulin and Lantus.Decreased appetite today. SW still working on TCU placement.

## 2024-04-16 NOTE — PROGRESS NOTES
Care Management Follow Up    Length of Stay (days): 2    Expected Discharge Date: 04/17/2024     Concerns to be Addressed:       Patient plan of care discussed at interdisciplinary rounds: Yes    Anticipated Discharge Disposition:  (TCU)     Anticipated Discharge Services:    Anticipated Discharge DME:      Patient/family educated on Medicare website which has current facility and service quality ratings:    Education Provided on the Discharge Plan:    Patient/Family in Agreement with the Plan: yes    Referrals Placed by CM/SW:    Private pay costs discussed: Not applicable    Additional Information:  Writer went to review status of TCU referrals. Referrals were not sent by previous . Writer completed referrals to patient's preferences of [facility].    FABRICIO Galvan, LGYANIQUE   Social Work   [facility]

## 2024-04-16 NOTE — PROGRESS NOTES
"[facility]    Hospitalist Progress Note    Assessment & Plan   Date of Admission:  4/14/2024        Assessment & Plan  Moira Andre is a 89 year old female admitted on 4/14/2024. She has h/o HFpEF, Afib (eliquis), CAD (Stents 2007), DMII (glimepiride), ELIGIO, [place], Ventral hernia, COPD/Asthma,      She presents with complaints of escalating wheezing/HENDERSON and cough .   Past 3 days or so.   SHe came from her apartment where she lives independently with her cat.   EMS noted O2sats in 80's, gave her duenebs X 2 on way to ER.  In ER, , started diltiazem gtt.   BP stable.        She presented similarly in 12/2023 -- but this on this presentation,  she also has a cough and wheezing.   Denies med non compliance.  No chest pain.  No increasing edema.  No GI complaints.           Afib RVR   (During 11/2023 admission, metoprolol increased to 75 bid)  Anticoagulated  HFpEF, decompensated  BNP 4000   Lactic Acidosis   2.7 > 2.0 ...  Elevated troponin (35 -  this is \"baseline\" per review of chart)--> 42, 33  Aortic Stenosis, moderate    Mitral Valve Stenosis, mild, with severe calcification    CAD (OM1 and diagonal stents 2007, angiograms 2013, 2017 showed patency)  -initiated on dilt gtt and iv/po metoprolol   -cr up with iv lasix X 2 on admission   -K and Mg at goal  - HR controlled on dilt gtt, stopped am 4/15  -lactic acid normalized  -flat troponin trend  -driven/ppt'd by copd exacerbation likely  -cardiology consulted and signed off and no further recs. Continue with PTA metoprolol. They reviewed echo and no concerning findings needing follow.   -Echo  -technically extremely difficult study as previously reported. Last  echocardiogram was 3-1/2 months ago.  -Normal LV size and systolic function.   -Study is inadequate to accurately assess  wall motion.  -possible mild to moderate hypokinesis of the mid anteroseptum on contrasted images.  - right ventricle is not well-seen. Probably borderline " "dilated. RV systolic  function is mildly/mild to moderately reduced.  -Aortic valve is difficult to visualize. probably mild to moderate  aortic stenosis with a mean gradient of 14 to 18 mmHg. Valve area is likely  underestimated due to reduce stroke-volume index.  - severe thickening of the mitral valve and mitral calcification noted.  Mean inflow gradient across the mitral valve ranging from 6 to 9 mmHg  consistent with moderate mitral stenosis.  Doppler suggest left to right interatrial shunt.  IVC is normal without pericardial effusion.    -remains in rate controlled afib.     Plan;   -- continue pta metoprolol, eliquis, atorvastatin  -hold diuretics again today   - strict I&O  - tele   -cardiology follow up outpatient   - Telemetry  - treat copd exacerbation     Acute Respiratory Failure with Hypoxia   COPD/Asthma  Wheezing  Cough     - Covid/Influenza/RSV screening (-)  -dry cough, productive initially, 4-5 days PTA. Some crackles bibasilar. Chf, possible pneumonia  - 4/14/24 portable CXR is poor quality, likely shows CHF.     - procalcitonin 0.12  - due to new cough and hypoxia, empirically started Abx (ceftriaxone/azithromycin) for possible CAP on admission contributing  - scheduled duonebs -- re assess daily and change to prn when appropriate.   - solumedrol IV  (hold pta Wixela inhaler during steroid therapy)  - assess for tapering daily     - of note, she was discharged 12/2023 with supplemental O2.  She states it \"burned her nose\" so she never really used it.  The tank was too heavy to be used with ambulation  Called oxygen company to cancel \"They never picked it up, still there\".     -briefly needed bipap night of admission    4/15, Feeling better, less sob. Still with bilateral wheezing. Oxygen needs decreasing  4/16: feeling better. Wheezing seems resolved. Reduced steroid dose  Bilateral crackles      Plan-  -follow oxygen needs  - IS, mobilize   -abx for now  - Xopenex qid for now  - change bid " solumedrol to prednisone 40mg every day      Addendum; 1500  - AGMA slightly worse, cr up a bit   Urine concentrated 400 ml UO today   Exam- bilateral wheezing. Cough  Crackles bilat  Jvp seems nl  Plan;  Cont fluids, am bmp  - increase steroids back to bid.   - prn neb now  - cxr now  - Fena, UA    Addendum; 1700  UA grossly abnormal but not clear catch with ++ squamous epitheilial cells  FeNA pending  CXR shows worsening pulmonary edema  Bilateral crackles. Seems more prominent today  Jvp difficult to assess.   Bmp not improve with fluids    Plan;    Stop fluids  Add on bnp  -viral panel   Restart diuretics given concern for chf.--> lasix 40mg IV q8 hours X 2 and recheck labs in am.   May need to see cardiology again to assist with volume status exam         CKD3b  Creatinine is 1.63 - at her baseline.   -CR 1.8 am 4/15 after lasix 40mg IV X 2 day of admission  -on Torsemide 20mg bid PTA  - cr up to 2.0 with rise in bun   -+ AGMA  -suspect volume down       PLan;   -hold diuretics.   - am bmp   - NS over 6 hours and recheck bmp      Nephrolithiasis  - per 11/2023 CT, she has 1 cm stone in R renal pelvis, nonobstructive  - no  complaints  - check UA if  complaints arise.  -  No elective intervention planned (decided per her PCP and urology), due to multiple co morbidities.            DMII  A1C = 7.7 2/22/2024  Hyperglycemia  - 300's at presentation.... starting steroid therapy         - pta glimepiride - dose increased by admitting hospitalist from 2mg bid to 4 mg bid while on solumedrol:   -started on lantus, prandial aspart and ISS        Plan'   -hold glimepiride in hospital  - reduce solumedrol to daily prednisone   -follow BS qid  -add prandial aspart in hospital- adjust as needed --> increase to 1 unit per 8 gram carbs  -start lantus and adjust as needed--> increase to 5 units qAM    - high intensity ISS (anticipate elevated sugars and resistance with steroid treatment)  -dm diet  - pcp follow up     "  Large ventral hernia  - affects her mobility and accessory muscle use  - not surgical candidate  -NT, no obstructive sxs    Bilateral Knee replacements   - affects mobility   -uses walker at home   -PT eval     ELIGIO  Her cpap machine is broken, has appt to fix  Will see if RT can help resume her CPAP while here when appropriate.      PT/OT/CC consults to help with dispo planning.       Tinea-  Breast and groin folds  - miconazole cream to rash bid.               Diet: dm diet  DVT Prophylaxis: DOAC  Charles Catheter: Not present  Lines: None     Cardiac Monitoring: ACTIVE order. Indication: Tachyarrhythmias, acute (48 hours)  Code Status: Full Code          Clinically Significant Risk Factors Present on Admission[]Expand by Default               # Drug Induced Coagulation Defect: home medication list includes an anticoagulant medication    # Hypertension: Noted on problem list  # Acute heart failure with preserved ejection fraction: heart failure noted on problem list, last echo with EF >50%, and receiving IV diuretics    # DMII: A1C = 7.7 % (Ref range: 0.0 - 5.6 %) within past 6 months    # Severe Obesity: Estimated body mass index is 41.5 kg/m  as calculated from the following:    Height as of this encounter: 1.702 m (5' 7\").    Weight as of this encounter: 120.2 kg (265 lb).       # Financial/Environmental Concerns:    # Asthma: noted on problem list               Disposition Plan     Medically Ready for Discharge: Anticipated in 1-2 Days      PTOT, SW eval. Likely need tcu, uses walker at home.   Pcp follow up      Moderate complexity, >35 on care coord with rn, chart review, charting., exam, , counseling patient    Tommy Hernandez MD, MD  Text Page  (7am to 6pm)  Interval History   Cough better  Sob and wheezing feel better  No new complaints   Taking po   Up walking in [place] with PT  yest    -Data reviewed today: I reviewed all new labs and imaging results over the last 24 hours. I personally " reviewed labs and imaging since admission    Physical Exam   Temp: (!) 96.4  F (35.8  C) Temp src: Oral BP: (!) 126/109 Pulse: 68   Resp: 24 SpO2: 96 % O2 Device: Nasal cannula Oxygen Delivery: 2 LPM  Vitals:    04/14/24 1657 04/15/24 0607 04/16/24 0500   Weight: 115.8 kg (255 lb 4.7 oz) 114 kg (251 lb 5.2 oz) 114 kg (251 lb 5.2 oz)     Vital Signs with Ranges  Temp:  [96.4  F (35.8  C)-97.5  F (36.4  C)] 96.4  F (35.8  C)  Pulse:  [] 68  Resp:  [15-28] 24  BP: (101-142)/() 126/109  SpO2:  [91 %-98 %] 96 %  I/O last 3 completed shifts:  In: 1010 [P.O.:1010]  Out: 900 [Urine:900]    Constitutional: Up in bed, nad  Neck: thick. Jvp seems wnl  Respiratory: cTAB today. Just had neb  Bibasilar crackles  No coughing today  Cardiovascular: irreg irreg rhythm. 2/6 systolic murmur RUSB  GI: ventral hernia. NT, nd  Skin/Integumen: rash bilateral groin and breast skin folds  Neuro: nl speech and mentation  Psych: nl affect    Medications   Current Facility-Administered Medications   Medication Dose Route Frequency Provider Last Rate Last Admin    diltiazem (CARDIZEM) 125 mg in sodium chloride 0.9 % 125 mL infusion  5-15 mg/hr Intravenous Continuous Hortencia Ohara MD   Paused at 04/15/24 0032    No anticoagulants IF patient has had acute trauma/surgery or recent intracranial, GI or urinary tract bleeding.    Other DOES NOT GO TO Hortencia Painting MD        Patient is already receiving anticoagulation with heparin, enoxaparin (LOVENOX), warfarin (COUMADIN)  or other anticoagulant medication   Does not apply Continuous PRN Hortencia Ohara MD         Current Facility-Administered Medications   Medication Dose Route Frequency Provider Last Rate Last Admin    apixaban ANTICOAGULANT (ELIQUIS) tablet 2.5 mg  2.5 mg Oral BID Hortencia Ohara MD   2.5 mg at 04/16/24 0850    atorvastatin (LIPITOR) tablet 20 mg  20 mg Oral QPM Hortencia Ohara MD   20 mg at 04/15/24 2006    azithromycin (ZITHROMAX)  250 mg in sodium chloride 0.9 % 250 mL intermittent infusion  250 mg Intravenous Q24H Hortencia Ohara MD   250 mg at 04/15/24 1805    buPROPion (WELLBUTRIN SR) 12 hr tablet 100 mg  100 mg Oral QPM Hortencia Ohara MD   100 mg at 04/15/24 2006    cefTRIAXone (ROCEPHIN) 2 g vial to attach to  ml bag for ADULTS or NS 50 ml bag for PEDS  2 g Intravenous Q24H Hortencia Ohara MD   2 g at 04/15/24 1509    DULoxetine (CYMBALTA) DR capsule 60 mg  60 mg Oral Daily Hortencia Ohara MD   60 mg at 04/16/24 0850    fexofenadine (ALLEGRA) tablet 180 mg  180 mg Oral QPM Hortencia Ohara MD   180 mg at 04/15/24 2006    insulin aspart (NovoLOG) injection (RAPID ACTING)   Subcutaneous TID w/meals Tommy Hernandez MD   Given at 04/16/24 1334    insulin aspart (NovoLOG) injection (RAPID ACTING)  1-10 Units Subcutaneous TID  Tommy Hernandez MD   5 Units at 04/16/24 1334    insulin aspart (NovoLOG) injection (RAPID ACTING)  1-7 Units Subcutaneous At Bedtime Tommy Hernandez MD        insulin glargine (LANTUS PEN) injection 5 Units  5 Units Subcutaneous QAM  Tommy Hernandez MD   5 Units at 04/16/24 0902    levalbuterol (XOPENEX) neb solution 1.25 mg  1.25 mg Nebulization 4x Daily Tommy Hernandez MD   1.25 mg at 04/16/24 1134    metoprolol tartrate (LOPRESSOR) tablet 75 mg  75 mg Oral BID Hortencia Ohara MD   75 mg at 04/16/24 0848    miconazole (MICATIN) 2 % cream   Topical BID Tommy Hernandez MD   Given at 04/16/24 0905    pantoprazole (PROTONIX) EC tablet 20 mg  20 mg Oral Daily Hortencia Ohara MD   20 mg at 04/16/24 0850    [START ON 4/17/2024] predniSONE (DELTASONE) tablet 40 mg  40 mg Oral Daily Tommy Hernandez MD        sodium chloride (PF) 0.9% PF flush 3 mL  3 mL Intracatheter Q8H Hortencia Ohara MD   3 mL at 04/16/24 0532    sodium chloride (PF) 0.9% PF flush 3 mL  3 mL Intracatheter Q8H Hortencia Ohara MD   3 mL at 04/16/24 0532    [Held by provider] torsemide  (DEMADEX) tablet 40 mg  40 mg Oral Daily Hortencia Ohara MD        Vitamin D3 (CHOLECALCIFEROL) tablet 25 mcg  25 mcg Oral Daily Hortencia Ohara MD   25 mcg at 04/16/24 0850       Data   Recent Labs   Lab 04/16/24  1338 04/16/24  1232 04/16/24  0827 04/16/24  0531 04/15/24  0958 04/15/24  0531 04/14/24  1744 04/14/24  1231   WBC  --   --   --   --   --  7.3  --  10.0   HGB  --   --   --   --   --  12.9  --  13.9   MCV  --   --   --   --   --  83  --  82   PLT  --   --   --   --   --  268  --  264     --   --  137  --  137  --  133*   POTASSIUM 4.4  --   --  4.5  --  4.9  --  4.6   CHLORIDE 98  --   --  98  --  98  --  96*   CO2 17*  --   --  19*  --  25  --  24   BUN 61.8*  --   --  58.4*  --  36.3*  --  30.6*   CR 1.98*  --   --  2.02*  --  1.84*  --  1.63*   ANIONGAP 20*  --   --  20*  --  14  --  13   TELLY 9.4  --   --  9.4  --  9.0  --  9.3   * 243* 245* 240*   < > 249*   < > 321*    < > = values in this interval not displayed.       Imaging:   No results found for this or any previous visit (from the past 24 hour(s)).

## 2024-04-17 ENCOUNTER — PATIENT OUTREACH (OUTPATIENT)
Dept: CARE COORDINATION | Facility: CLINIC | Age: 89
End: 2024-04-17
Payer: COMMERCIAL

## 2024-04-17 ENCOUNTER — APPOINTMENT (OUTPATIENT)
Dept: PHYSICAL THERAPY | Facility: CLINIC | Age: 89
DRG: 291 | End: 2024-04-17
Payer: COMMERCIAL

## 2024-04-17 LAB
ANION GAP SERPL CALCULATED.3IONS-SCNC: 16 MMOL/L (ref 7–15)
ANION GAP SERPL CALCULATED.3IONS-SCNC: 19 MMOL/L (ref 7–15)
BUN SERPL-MCNC: 68.3 MG/DL (ref 8–23)
BUN SERPL-MCNC: 71.4 MG/DL (ref 8–23)
C PNEUM DNA SPEC QL NAA+PROBE: NOT DETECTED
CALCIUM SERPL-MCNC: 8.8 MG/DL (ref 8.2–9.6)
CALCIUM SERPL-MCNC: 9.1 MG/DL (ref 8.2–9.6)
CHLORIDE SERPL-SCNC: 95 MMOL/L (ref 98–107)
CHLORIDE SERPL-SCNC: 97 MMOL/L (ref 98–107)
CREAT SERPL-MCNC: 2.06 MG/DL (ref 0.51–0.95)
CREAT SERPL-MCNC: 2.07 MG/DL (ref 0.51–0.95)
DEPRECATED HCO3 PLAS-SCNC: 19 MMOL/L (ref 22–29)
DEPRECATED HCO3 PLAS-SCNC: 21 MMOL/L (ref 22–29)
EGFRCR SERPLBLD CKD-EPI 2021: 22 ML/MIN/1.73M2
EGFRCR SERPLBLD CKD-EPI 2021: 22 ML/MIN/1.73M2
ERYTHROCYTE [DISTWIDTH] IN BLOOD BY AUTOMATED COUNT: 16.8 % (ref 10–15)
FLUAV H1 2009 PAND RNA SPEC QL NAA+PROBE: NOT DETECTED
FLUAV H1 RNA SPEC QL NAA+PROBE: NOT DETECTED
FLUAV H3 RNA SPEC QL NAA+PROBE: NOT DETECTED
FLUAV RNA SPEC QL NAA+PROBE: NOT DETECTED
FLUBV RNA SPEC QL NAA+PROBE: NOT DETECTED
GLUCOSE BLDC GLUCOMTR-MCNC: 134 MG/DL (ref 70–99)
GLUCOSE BLDC GLUCOMTR-MCNC: 169 MG/DL (ref 70–99)
GLUCOSE BLDC GLUCOMTR-MCNC: 180 MG/DL (ref 70–99)
GLUCOSE BLDC GLUCOMTR-MCNC: 187 MG/DL (ref 70–99)
GLUCOSE BLDC GLUCOMTR-MCNC: 211 MG/DL (ref 70–99)
GLUCOSE SERPL-MCNC: 191 MG/DL (ref 70–99)
GLUCOSE SERPL-MCNC: 218 MG/DL (ref 70–99)
HADV DNA SPEC QL NAA+PROBE: NOT DETECTED
HCOV PNL SPEC NAA+PROBE: NOT DETECTED
HCT VFR BLD AUTO: 44.6 % (ref 35–47)
HGB BLD-MCNC: 13.6 G/DL (ref 11.7–15.7)
HMPV RNA SPEC QL NAA+PROBE: DETECTED
HPIV1 RNA SPEC QL NAA+PROBE: NOT DETECTED
HPIV2 RNA SPEC QL NAA+PROBE: NOT DETECTED
HPIV3 RNA SPEC QL NAA+PROBE: NOT DETECTED
HPIV4 RNA SPEC QL NAA+PROBE: NOT DETECTED
LACTATE SERPL-SCNC: 1.6 MMOL/L (ref 0.7–2)
M PNEUMO DNA SPEC QL NAA+PROBE: NOT DETECTED
MCH RBC QN AUTO: 25.2 PG (ref 26.5–33)
MCHC RBC AUTO-ENTMCNC: 30.5 G/DL (ref 31.5–36.5)
MCV RBC AUTO: 83 FL (ref 78–100)
PLATELET # BLD AUTO: 336 10E3/UL (ref 150–450)
POTASSIUM SERPL-SCNC: 4.1 MMOL/L (ref 3.4–5.3)
POTASSIUM SERPL-SCNC: 4.5 MMOL/L (ref 3.4–5.3)
RBC # BLD AUTO: 5.39 10E6/UL (ref 3.8–5.2)
RSV RNA SPEC QL NAA+PROBE: NOT DETECTED
RSV RNA SPEC QL NAA+PROBE: NOT DETECTED
RV+EV RNA SPEC QL NAA+PROBE: NOT DETECTED
SODIUM SERPL-SCNC: 132 MMOL/L (ref 135–145)
SODIUM SERPL-SCNC: 135 MMOL/L (ref 135–145)
WBC # BLD AUTO: 17.4 10E3/UL (ref 4–11)

## 2024-04-17 PROCEDURE — 94640 AIRWAY INHALATION TREATMENT: CPT

## 2024-04-17 PROCEDURE — 250N000011 HC RX IP 250 OP 636: Performed by: INTERNAL MEDICINE

## 2024-04-17 PROCEDURE — 87633 RESP VIRUS 12-25 TARGETS: CPT | Performed by: INTERNAL MEDICINE

## 2024-04-17 PROCEDURE — 83605 ASSAY OF LACTIC ACID: CPT | Performed by: INTERNAL MEDICINE

## 2024-04-17 PROCEDURE — 999N000157 HC STATISTIC RCP TIME EA 10 MIN

## 2024-04-17 PROCEDURE — 250N000013 HC RX MED GY IP 250 OP 250 PS 637: Performed by: INTERNAL MEDICINE

## 2024-04-17 PROCEDURE — 94640 AIRWAY INHALATION TREATMENT: CPT | Mod: 76

## 2024-04-17 PROCEDURE — 210N000001 HC R&B IMCU HEART CARE

## 2024-04-17 PROCEDURE — 258N000003 HC RX IP 258 OP 636: Performed by: INTERNAL MEDICINE

## 2024-04-17 PROCEDURE — 94660 CPAP INITIATION&MGMT: CPT

## 2024-04-17 PROCEDURE — 36415 COLL VENOUS BLD VENIPUNCTURE: CPT | Performed by: INTERNAL MEDICINE

## 2024-04-17 PROCEDURE — 85027 COMPLETE CBC AUTOMATED: CPT | Performed by: INTERNAL MEDICINE

## 2024-04-17 PROCEDURE — 80048 BASIC METABOLIC PNL TOTAL CA: CPT | Performed by: INTERNAL MEDICINE

## 2024-04-17 PROCEDURE — 99233 SBSQ HOSP IP/OBS HIGH 50: CPT | Performed by: INTERNAL MEDICINE

## 2024-04-17 PROCEDURE — 250N000009 HC RX 250: Performed by: INTERNAL MEDICINE

## 2024-04-17 PROCEDURE — 97530 THERAPEUTIC ACTIVITIES: CPT | Mod: GP

## 2024-04-17 RX ORDER — FUROSEMIDE 10 MG/ML
60 INJECTION INTRAMUSCULAR; INTRAVENOUS ONCE
Status: COMPLETED | OUTPATIENT
Start: 2024-04-17 | End: 2024-04-17

## 2024-04-17 RX ORDER — FUROSEMIDE 10 MG/ML
40 INJECTION INTRAMUSCULAR; INTRAVENOUS EVERY 8 HOURS
Status: DISCONTINUED | OUTPATIENT
Start: 2024-04-17 | End: 2024-04-17

## 2024-04-17 RX ADMIN — FEXOFENADINE HCL 180 MG: 180 TABLET ORAL at 19:56

## 2024-04-17 RX ADMIN — METHYLPREDNISOLONE SODIUM SUCCINATE 40 MG: 40 INJECTION, POWDER, FOR SOLUTION INTRAMUSCULAR; INTRAVENOUS at 06:06

## 2024-04-17 RX ADMIN — GUAIFENESIN 200 MG: 200 SOLUTION ORAL at 17:02

## 2024-04-17 RX ADMIN — LEVALBUTEROL HYDROCHLORIDE 1.25 MG: 1.25 SOLUTION RESPIRATORY (INHALATION) at 18:58

## 2024-04-17 RX ADMIN — CALCIUM CARBONATE (ANTACID) CHEW TAB 500 MG 1000 MG: 500 CHEW TAB at 21:42

## 2024-04-17 RX ADMIN — FUROSEMIDE 40 MG: 10 INJECTION, SOLUTION INTRAMUSCULAR; INTRAVENOUS at 11:33

## 2024-04-17 RX ADMIN — PANTOPRAZOLE SODIUM 20 MG: 20 TABLET, DELAYED RELEASE ORAL at 08:18

## 2024-04-17 RX ADMIN — ATORVASTATIN CALCIUM 20 MG: 20 TABLET, FILM COATED ORAL at 19:56

## 2024-04-17 RX ADMIN — LEVALBUTEROL HYDROCHLORIDE 1.25 MG: 1.25 SOLUTION RESPIRATORY (INHALATION) at 11:18

## 2024-04-17 RX ADMIN — LEVALBUTEROL HYDROCHLORIDE 1.25 MG: 1.25 SOLUTION RESPIRATORY (INHALATION) at 08:22

## 2024-04-17 RX ADMIN — METHYLPREDNISOLONE SODIUM SUCCINATE 40 MG: 40 INJECTION, POWDER, FOR SOLUTION INTRAMUSCULAR; INTRAVENOUS at 18:00

## 2024-04-17 RX ADMIN — LEVALBUTEROL HYDROCHLORIDE 1.25 MG: 1.25 SOLUTION RESPIRATORY (INHALATION) at 17:27

## 2024-04-17 RX ADMIN — LEVALBUTEROL HYDROCHLORIDE 1.25 MG: 1.25 SOLUTION RESPIRATORY (INHALATION) at 22:51

## 2024-04-17 RX ADMIN — Medication 25 MCG: at 08:18

## 2024-04-17 RX ADMIN — APIXABAN 2.5 MG: 2.5 TABLET, FILM COATED ORAL at 08:18

## 2024-04-17 RX ADMIN — MICONAZOLE NITRATE: 20 CREAM TOPICAL at 08:18

## 2024-04-17 RX ADMIN — MICONAZOLE NITRATE: 20 CREAM TOPICAL at 20:00

## 2024-04-17 RX ADMIN — BUPROPION HYDROCHLORIDE 100 MG: 100 TABLET, FILM COATED, EXTENDED RELEASE ORAL at 19:56

## 2024-04-17 RX ADMIN — CEFTRIAXONE SODIUM 2 G: 2 INJECTION, POWDER, FOR SOLUTION INTRAMUSCULAR; INTRAVENOUS at 14:41

## 2024-04-17 RX ADMIN — FUROSEMIDE 40 MG: 10 INJECTION, SOLUTION INTRAMUSCULAR; INTRAVENOUS at 02:14

## 2024-04-17 RX ADMIN — APIXABAN 2.5 MG: 2.5 TABLET, FILM COATED ORAL at 19:56

## 2024-04-17 RX ADMIN — FUROSEMIDE 60 MG: 10 INJECTION, SOLUTION INTRAMUSCULAR; INTRAVENOUS at 23:34

## 2024-04-17 RX ADMIN — METOPROLOL TARTRATE 75 MG: 50 TABLET, FILM COATED ORAL at 19:56

## 2024-04-17 RX ADMIN — SENNOSIDES AND DOCUSATE SODIUM 2 TABLET: 50; 8.6 TABLET ORAL at 19:56

## 2024-04-17 RX ADMIN — METOPROLOL TARTRATE 75 MG: 50 TABLET, FILM COATED ORAL at 08:17

## 2024-04-17 RX ADMIN — INSULIN HUMAN 3 UNITS: 100 INJECTION, SUSPENSION SUBCUTANEOUS at 18:01

## 2024-04-17 RX ADMIN — AZITHROMYCIN MONOHYDRATE 250 MG: 500 INJECTION, POWDER, LYOPHILIZED, FOR SOLUTION INTRAVENOUS at 16:46

## 2024-04-17 RX ADMIN — ACETAMINOPHEN 650 MG: 325 TABLET, FILM COATED ORAL at 21:42

## 2024-04-17 RX ADMIN — DULOXETINE HYDROCHLORIDE 60 MG: 60 CAPSULE, DELAYED RELEASE ORAL at 08:18

## 2024-04-17 ASSESSMENT — ACTIVITIES OF DAILY LIVING (ADL)
ADLS_ACUITY_SCORE: 37
ADLS_ACUITY_SCORE: 41
ADLS_ACUITY_SCORE: 37
ADLS_ACUITY_SCORE: 41
ADLS_ACUITY_SCORE: 37
ADLS_ACUITY_SCORE: 41
ADLS_ACUITY_SCORE: 37
ADLS_ACUITY_SCORE: 41
ADLS_ACUITY_SCORE: 41
ADLS_ACUITY_SCORE: 37
ADLS_ACUITY_SCORE: 41
ADLS_ACUITY_SCORE: 37
ADLS_ACUITY_SCORE: 41
ADLS_ACUITY_SCORE: 41
ADLS_ACUITY_SCORE: 37
ADLS_ACUITY_SCORE: 41
ADLS_ACUITY_SCORE: 37
ADLS_ACUITY_SCORE: 41
ADLS_ACUITY_SCORE: 41
ADLS_ACUITY_SCORE: 37
ADLS_ACUITY_SCORE: 41

## 2024-04-17 NOTE — PLAN OF CARE
Care plan note:      Recent Vitals:  Temp: 97.6  F (36.4  C) Temp src: Axillary BP: 137/88 Pulse: 69   Resp: 18 SpO2: 97 % O2 Device: Nasal cannula      Orientation/Neuro: Alert and Oriented x 4  Pain: The patient is not having any pain.   Tele: Atrial fibrillation - controlled   IV medications: IV antibiotics   Mobility: Assist of 1, Gait belt, and Walker   Skin: Rashes: Abdominal fold and under breast.   Resp: 2L  GI: WDL  : Strict I&O     Diet: Tolerating diet:   Well  Orders Placed This Encounter      Moderate Consistent Carb (60 g CHO per Meal) Diet      Safety/Concerns:  Fall Risk and Edwardo Risk  Aggression Color: Green    Plan: Pulmonary hygiene promoted, ambulation encouraged.  continue to diurese with IV lasix.    Continue to monitor.      Kelsey Benton RN

## 2024-04-17 NOTE — PLAN OF CARE
Neuro: A&Ox4  Tele/Cardiac: A-fib CVR  Resp: 3L NC, congested cough  Activity: SBA gb/walker  Pain: denies  Drips/IV: SL  GI/: senna given at bedtime, last BM 4/14. External catheter, brown urine  Skin: redness under breast and in groin  Diet: Diet: Moderate Consistent Carb (60 g CHO per Meal) Diet     Test/Procedures: n/a  Plan: monitor fluid status, discharge to TCU when medically stable

## 2024-04-17 NOTE — PROGRESS NOTES
Care Management Follow Up    Length of Stay (days): 3    Expected Discharge Date: 04/18/2024     Concerns to be Addressed:       Patient plan of care discussed at interdisciplinary rounds: Yes    Anticipated Discharge Disposition:  (TCU)     Anticipated Discharge Services:    Anticipated Discharge DME:      Patient/family educated on Medicare website which has current facility and service quality ratings:    Education Provided on the Discharge Plan:    Patient/Family in Agreement with the Plan: yes    Referrals Placed by CM/SW:    Private pay costs discussed: Not applicable    Additional Information:  Writer attended morning charge report. Care coordinator spoke with doctor who states the earliest that pt would be stable for discharge would be Saturday or Sunday. Writer able to see that [facility]. Writer placed phone call to [facility] and spoke with Jeanette. Jeanette states understanding.     WANDY ColonW  Social Work  [facility]

## 2024-04-17 NOTE — PROGRESS NOTES
"[facility]    Hospitalist Progress Note    Assessment & Plan   Date of Admission:  4/14/2024        Assessment & Plan  Moira Andre is a 89 year old female admitted on 4/14/2024. She has h/o HFpEF, Afib (eliquis), CAD (Stents 2007), DMII (glimepiride), ELIGIO, [place], Ventral hernia, COPD/Asthma,      She presents with complaints of escalating wheezing/HEND[place] and cough .   Past 3 days or so.   SHe came from her apartment where she lives independently with her cat.   EMS noted O2sats in 80's, gave her duenebs X 2 on way to ER.  In ER, , started diltiazem gtt.   BP stable.        She presented similarly in 12/2023 -- but this on this presentation,  she also has a cough and wheezing.   Denies med non compliance.  No chest pain.  No increasing edema.  No GI complaints.           Afib RVR   (During 11/2023 admission, metoprolol increased to 75 bid)- resolved   Chronic atrial fibrillation   Anticoagulated  HFpEF, decompensated  BNP 4000   Lactic Acidosis   2.7 > 2.0 ...  Elevated troponin (35 -  this is \"baseline\" per review of chart)--> 42, 33  Aortic Stenosis, moderate    Mitral Valve Stenosis, mild, with severe calcification    CAD (OM1 and diagonal stents 2007, angiograms 2013, 2017 showed patency)  -initiated on dilt gtt and iv/po metoprolol   -cr up with iv lasix X 2 on admission   -K and Mg at goal  - HR controlled on dilt gtt, stopped am 4/15  -lactic acid normalized  -flat troponin trend  -driven/ppt'd by copd exacerbation likely  -cardiology consulted and signed off and no further recs. Continue with PTA metoprolol. They reviewed echo and no concerning findings needing follow.   -Echo  -technically extremely difficult study as previously reported. Last  echocardiogram was 3-1/2 months ago.  -Normal LV size and systolic function.   -Study is inadequate to accurately assess  wall motion.  -possible mild to moderate hypokinesis of the mid anteroseptum on contrasted images.  - right ventricle " is not well-seen. Probably borderline dilated. RV systolic  function is mildly/mild to moderately reduced.  -Aortic valve is difficult to visualize. probably mild to moderate  aortic stenosis with a mean gradient of 14 to 18 mmHg. Valve area is likely  underestimated due to reduce stroke-volume index.  - severe thickening of the mitral valve and mitral calcification noted.  Mean inflow gradient across the mitral valve ranging from 6 to 9 mmHg  consistent with moderate mitral stenosis.  Doppler suggest left to right interatrial shunt.  IVC is normal without pericardial effusion.    -remains in rate controlled afib.   -4/16. Worsening TIFFANI, new AGMA, elevated lactate, increase in BNP  -Fena <1%, increase in Bilateral crackles. No response of bmp to fluids on 4/16,   - repeat CXR 2V showing worsening pulmonary edema. No desats, still on 2-3L NC  -fluids stopped  - restarted on lasix 40mg IV q8 hours   -good UO 1400cc UO 4/17  -4/17- feels better. Less crackles on exam.   Sob seems better. No wheezing   Lactate level wnl at 1.6.       Plan;   -cont lasix 40mg IV q8 hours and reassess in am  - follow for need of cardiology reconsult  -- continue pta metoprolol, eliquis, atorvastatin  - strict I&O  - tele  -am bmp   -cardiology follow up outpatient   - Telemetry  - treat copd exacerbation     Acute Respiratory Failure with Hypoxia   COPD/Asthma  Wheezing  Cough     - Covid/Influenza/RSV screening (-)  -dry cough, productive initially, 4-5 days PTA. Some crackles bibasilar. Chf, possible pneumonia  - 4/14/24 portable CXR is poor quality, likely shows CHF.     - procalcitonin 0.12  - due to new cough and hypoxia, empirically started Abx (ceftriaxone/azithromycin) for possible CAP on admission contributing  - scheduled duonebs -- re assess daily and change to prn when appropriate.   - solumedrol IV  (hold pta Wixela inhaler during steroid therapy)  - assess for tapering daily     - of note, she was discharged 12/2023 with  "supplemental O2.  She states it \"burned her nose\" so she never really used it.  The tank was too heavy to be used with ambulation  Called oxygen company to cancel \"They never picked it up, still there\".     -briefly needed bipap night of admission    4/15, Feeling better, less sob. Still with bilateral wheezing. Oxygen needs decreasing  4/16: feeling better. Wheezing resolved in am but recurrent in afternoon. Bid steroids continued. Possible cardiac wheezing, iv lasix diuresis resumed   4/17. No wheezing today. Crackles reduced.   Wbc up but suspect from steroids. Dry cough. Up in [place] today walking      Plan-  -viral panel  -cont abx   -follow oxygen needs  - IS, mobilize   -abx for now  - Xopenex q4 hours for now  -solumedrol 40mg iv bid for now. Reassess daily            CKD3b  Creatinine is 1.63 - at her baseline.   -CR 1.8 am 4/15 after lasix 40mg IV X 2 day of admission  -on Torsemide 20mg bid PTA  - cr up to 2.0 with rise in bun   -+ AGMA  -suspect volume down       PLan;   -hold diuretics.   - am bmp   - NS over 6 hours and recheck bmp      Nephrolithiasis  - per 11/2023 CT, she has 1 cm stone in R renal pelvis, nonobstructive  - no  complaints  - check UA if  complaints arise.  -  No elective intervention planned (decided per her PCP and urology), due to multiple co morbidities.      -UA abnl for TIFFANI work up 4/16 but not flank pain           DMII  A1C = 7.7 2/22/2024  Hyperglycemia  - 300's at presentation.... starting steroid therapy         - holding pta glimepiride  -started on lantus, prandial aspart and ISS, dose adjustment   - range        Plan'   -hold glimepiride in hospital  -follow BS qid  -prandial aspart increase to 1 unit per 6 gram carbs  -lantus5 units qAM    - high intensity ISS (anticipate elevated sugars and resistance with steroid treatment)  -dm diet  - pcp follow up   -nph to cover night time rise in BS with steroids        Large ventral hernia  - affects her mobility and " "accessory muscle use  - not surgical candidate  -NT, no obstructive sxs    Bilateral Knee replacements   - affects mobility   -uses walker at home   -PT eval     ELIGIO  Her cpap machine is broken, has appt to fix  Will see if RT can help resume her CPAP while here when appropriate.      PT/OT/CC consults to help with dispo planning.       Tinea-  Breast and groin folds  - miconazole cream to rash bid.               Diet: dm diet  DVT Prophylaxis: DOAC  Charles Catheter: Not present  Lines: None     Cardiac Monitoring: ACTIVE order. Indication: Tachyarrhythmias, acute (48 hours)  Code Status: Full Code          Clinically Significant Risk Factors Present on Admission[]Expand by Default               # Drug Induced Coagulation Defect: home medication list includes an anticoagulant medication    # Hypertension: Noted on problem list  # Acute heart failure with preserved ejection fraction: heart failure noted on problem list, last echo with EF >50%, and receiving IV diuretics    # DMII: A1C = 7.7 % (Ref range: 0.0 - 5.6 %) within past 6 months    # Severe Obesity: Estimated body mass index is 41.5 kg/m  as calculated from the following:    Height as of this encounter: 1.702 m (5' 7\").    Weight as of this encounter: 120.2 kg (265 lb).       # Financial/Environmental Concerns:    # Asthma: noted on problem list               Disposition Plan     Medically Ready for Discharge: Anticipated in 2-3  Days      PTOT, SW eval. Likely need tcu, uses walker at home.   Pcp follow up      Moderate complexity, >35 on care coord with rn, chart review, charting., exam, , counseling patient    Tommy Hernandze MD, MD  Text Page  (7am to 6pm)  Interval History   Good UO  Feels breathing is a little better  No back or abd pain  Taking po    -Data reviewed today: I reviewed all new labs and imaging results over the last 24 hours. I personally reviewed labs and imaging since admission    Physical Exam   Temp: 97.6  F (36.4  C) " Temp src: Axillary BP: 128/75 Pulse: 81   Resp: 14 SpO2: 94 % O2 Device: Nasal cannula Oxygen Delivery: 2 LPM  Vitals:    04/16/24 0500 04/17/24 0600 04/17/24 0830   Weight: 114 kg (251 lb 5.2 oz) (!) 256.5 kg (565 lb 7.7 oz) 116.7 kg (257 lb 4.8 oz)     Vital Signs with Ranges  Temp:  [96.7  F (35.9  C)-97.8  F (36.6  C)] 97.6  F (36.4  C)  Pulse:  [65-94] 81  Resp:  [12-21] 14  BP: (110-143)/(63-93) 128/75  SpO2:  [93 %-99 %] 94 %  I/O last 3 completed shifts:  In: 1920 [P.O.:620; I.V.:1300]  Out: 1850 [Urine:1850]    Constitutional: Up in chair, nad  Neck: thick. Jvp difficult to assess   Respiratory:  Bibasilar crackles- better today   Dry coughing  Breathing easily   Cardiovascular: irreg irreg rhythm. 2/6 systolic murmur RUSB, not tachy  GI: ventral hernia. NT, nd  Skin/Integumen: rash bilateral groin and breast skin folds  Neuro: nl speech and mentation  Psych: nl affect    Medications   Current Facility-Administered Medications   Medication Dose Route Frequency Provider Last Rate Last Admin    diltiazem (CARDIZEM) 125 mg in sodium chloride 0.9 % 125 mL infusion  5-15 mg/hr Intravenous Continuous Hortencia Ohara MD   Paused at 04/15/24 0032    No anticoagulants IF patient has had acute trauma/surgery or recent intracranial, GI or urinary tract bleeding.    Other DOES NOT GO TO Hortencia Painting MD        Patient is already receiving anticoagulation with heparin, enoxaparin (LOVENOX), warfarin (COUMADIN)  or other anticoagulant medication   Does not apply Continuous PRN Hortencia Ohara MD         Current Facility-Administered Medications   Medication Dose Route Frequency Provider Last Rate Last Admin    apixaban ANTICOAGULANT (ELIQUIS) tablet 2.5 mg  2.5 mg Oral BID Hortencia Ohara MD   2.5 mg at 04/17/24 0818    atorvastatin (LIPITOR) tablet 20 mg  20 mg Oral QPM Hortencia Ohara MD   20 mg at 04/16/24 2102    azithromycin (ZITHROMAX) 250 mg in sodium chloride 0.9 % 250 mL  intermittent infusion  250 mg Intravenous Q24H Hortencia Ohara MD   250 mg at 04/16/24 1658    buPROPion (WELLBUTRIN SR) 12 hr tablet 100 mg  100 mg Oral QPM Hortencia Ohara MD   100 mg at 04/16/24 2102    cefTRIAXone (ROCEPHIN) 2 g vial to attach to  ml bag for ADULTS or NS 50 ml bag for PEDS  2 g Intravenous Q24H Hortencia Ohara MD   2 g at 04/16/24 1517    DULoxetine (CYMBALTA) DR capsule 60 mg  60 mg Oral Daily Hortencia Ohara MD   60 mg at 04/17/24 0818    fexofenadine (ALLEGRA) tablet 180 mg  180 mg Oral QPM Hortencia Ohara MD   180 mg at 04/16/24 2102    furosemide (LASIX) injection 40 mg  40 mg Intravenous Q8H Tommy Hernandez MD   40 mg at 04/17/24 1133    insulin aspart (NovoLOG) injection (RAPID ACTING)   Subcutaneous TID w/meals Tommy Hernandez MD   4 Units at 04/17/24 0815    insulin aspart (NovoLOG) injection (RAPID ACTING)  1-10 Units Subcutaneous TID AC Tommy Hernandez MD   2 Units at 04/17/24 0815    insulin aspart (NovoLOG) injection (RAPID ACTING)  1-7 Units Subcutaneous At Bedtime Tommy Hernandez MD        insulin glargine (LANTUS PEN) injection 5 Units  5 Units Subcutaneous QAM AC Tommy Hernandez MD   5 Units at 04/17/24 0815    insulin NPH injection 3 Units  3 Units Subcutaneous Daily with supper Tommy Hernandez MD   3 Units at 04/16/24 1832    levalbuterol (XOPENEX) neb solution 1.25 mg  1.25 mg Nebulization Q4H Tommy Hernandez MD   1.25 mg at 04/17/24 1118    methylPREDNISolone sodium succinate (SOLU-MEDROL) injection 40 mg  40 mg Intravenous Q12H Tommy Hernandez MD   40 mg at 04/17/24 0606    metoprolol tartrate (LOPRESSOR) tablet 75 mg  75 mg Oral BID Hortencia Ohara MD   75 mg at 04/17/24 0817    miconazole (MICATIN) 2 % cream   Topical BID Tommy Hernandez MD   Given at 04/17/24 0818    pantoprazole (PROTONIX) EC tablet 20 mg  20 mg Oral Daily Hortencia Ohara MD   20 mg at 04/17/24 0818    sodium chloride (PF) 0.9% PF flush  3 mL  3 mL Intracatheter Q8H Hortencia Ohara MD   3 mL at 04/16/24 0532    [Held by provider] torsemide (DEMADEX) tablet 40 mg  40 mg Oral Daily Hortencia Ohara MD        Vitamin D3 (CHOLECALCIFEROL) tablet 25 mcg  25 mcg Oral Daily Hortencia Ohara MD   25 mcg at 04/17/24 0818       Data   Recent Labs   Lab 04/17/24  0726 04/17/24  0725 04/17/24  0158 04/16/24  1704 04/16/24  1546 04/16/24  1338 04/16/24  0827 04/16/24  0531 04/15/24  0958 04/15/24  0531   WBC 17.4*  --   --   --  15.7*  --   --   --   --  7.3   HGB 13.6  --   --   --  12.8  --   --   --   --  12.9   MCV 83  --   --   --  83  --   --   --   --  83     --   --   --  308  --   --   --   --  268     --   --   --   --  135  --  137  --  137   POTASSIUM 4.5  --   --   --   --  4.4  --  4.5  --  4.9   CHLORIDE 97*  --   --   --   --  98  --  98  --  98   CO2 19*  --   --   --   --  17*  --  19*  --  25   BUN 68.3*  --   --   --   --  61.8*  --  58.4*  --  36.3*   CR 2.06*  --   --   --   --  1.98*  --  2.02*  --  1.84*   ANIONGAP 19*  --   --   --   --  20*  --  20*  --  14   TELLY 9.1  --   --   --   --  9.4  --  9.4  --  9.0   * 187* 180*   < >  --  235*   < > 240*   < > 249*    < > = values in this interval not displayed.       Imaging:   Recent Results (from the past 24 hour(s))   XR Chest 2 Views    Narrative    XR CHEST 2 VIEWS  4/16/2024 4:02 PM       INDICATION: follow up infiltrates  COMPARISON: 4/14/2024       Impression    IMPRESSION: Increased pulmonary vascular congestion and diffuse  interstitial opacities likely representing pulmonary edema when  compared to previous. Trace bilateral pleural effusions.    KASSANDRA SCHILLING MD         SYSTEM ID:  [id number]

## 2024-04-18 ENCOUNTER — APPOINTMENT (OUTPATIENT)
Dept: PHYSICAL THERAPY | Facility: CLINIC | Age: 89
DRG: 291 | End: 2024-04-18
Payer: COMMERCIAL

## 2024-04-18 ENCOUNTER — APPOINTMENT (OUTPATIENT)
Dept: OCCUPATIONAL THERAPY | Facility: CLINIC | Age: 89
DRG: 291 | End: 2024-04-18
Payer: COMMERCIAL

## 2024-04-18 LAB
ANION GAP SERPL CALCULATED.3IONS-SCNC: 14 MMOL/L (ref 7–15)
BACTERIA UR CULT: NORMAL
BUN SERPL-MCNC: 74.4 MG/DL (ref 8–23)
CALCIUM SERPL-MCNC: 8.9 MG/DL (ref 8.2–9.6)
CHLORIDE SERPL-SCNC: 99 MMOL/L (ref 98–107)
CREAT SERPL-MCNC: 2.16 MG/DL (ref 0.51–0.95)
DEPRECATED HCO3 PLAS-SCNC: 25 MMOL/L (ref 22–29)
EGFRCR SERPLBLD CKD-EPI 2021: 21 ML/MIN/1.73M2
GLUCOSE BLDC GLUCOMTR-MCNC: 120 MG/DL (ref 70–99)
GLUCOSE BLDC GLUCOMTR-MCNC: 150 MG/DL (ref 70–99)
GLUCOSE BLDC GLUCOMTR-MCNC: 160 MG/DL (ref 70–99)
GLUCOSE BLDC GLUCOMTR-MCNC: 216 MG/DL (ref 70–99)
GLUCOSE BLDC GLUCOMTR-MCNC: 88 MG/DL (ref 70–99)
GLUCOSE SERPL-MCNC: 141 MG/DL (ref 70–99)
MAGNESIUM SERPL-MCNC: 2.2 MG/DL (ref 1.7–2.3)
POTASSIUM SERPL-SCNC: 4 MMOL/L (ref 3.4–5.3)
SODIUM SERPL-SCNC: 138 MMOL/L (ref 135–145)

## 2024-04-18 PROCEDURE — 83735 ASSAY OF MAGNESIUM: CPT | Performed by: INTERNAL MEDICINE

## 2024-04-18 PROCEDURE — 97530 THERAPEUTIC ACTIVITIES: CPT | Mod: GP

## 2024-04-18 PROCEDURE — 80048 BASIC METABOLIC PNL TOTAL CA: CPT | Performed by: INTERNAL MEDICINE

## 2024-04-18 PROCEDURE — 36415 COLL VENOUS BLD VENIPUNCTURE: CPT | Performed by: INTERNAL MEDICINE

## 2024-04-18 PROCEDURE — 94640 AIRWAY INHALATION TREATMENT: CPT | Mod: 76

## 2024-04-18 PROCEDURE — 250N000013 HC RX MED GY IP 250 OP 250 PS 637: Performed by: INTERNAL MEDICINE

## 2024-04-18 PROCEDURE — 250N000009 HC RX 250: Performed by: INTERNAL MEDICINE

## 2024-04-18 PROCEDURE — 999N000157 HC STATISTIC RCP TIME EA 10 MIN

## 2024-04-18 PROCEDURE — 97535 SELF CARE MNGMENT TRAINING: CPT | Mod: GO

## 2024-04-18 PROCEDURE — 94660 CPAP INITIATION&MGMT: CPT

## 2024-04-18 PROCEDURE — 258N000003 HC RX IP 258 OP 636: Performed by: INTERNAL MEDICINE

## 2024-04-18 PROCEDURE — 94640 AIRWAY INHALATION TREATMENT: CPT

## 2024-04-18 PROCEDURE — 250N000011 HC RX IP 250 OP 636: Performed by: INTERNAL MEDICINE

## 2024-04-18 PROCEDURE — 99233 SBSQ HOSP IP/OBS HIGH 50: CPT | Performed by: INTERNAL MEDICINE

## 2024-04-18 PROCEDURE — 210N000002 HC R&B HEART CARE

## 2024-04-18 RX ORDER — FUROSEMIDE 10 MG/ML
60 INJECTION INTRAMUSCULAR; INTRAVENOUS ONCE
Status: COMPLETED | OUTPATIENT
Start: 2024-04-18 | End: 2024-04-18

## 2024-04-18 RX ORDER — PREDNISONE 10 MG/1
40 TABLET ORAL DAILY
Status: DISCONTINUED | OUTPATIENT
Start: 2024-04-19 | End: 2024-04-22 | Stop reason: HOSPADM

## 2024-04-18 RX ORDER — CEFTRIAXONE 2 G/1
2 INJECTION, POWDER, FOR SOLUTION INTRAMUSCULAR; INTRAVENOUS EVERY 24 HOURS
Status: COMPLETED | OUTPATIENT
Start: 2024-04-18 | End: 2024-04-20

## 2024-04-18 RX ADMIN — CEFTRIAXONE SODIUM 2 G: 2 INJECTION, POWDER, FOR SOLUTION INTRAMUSCULAR; INTRAVENOUS at 13:47

## 2024-04-18 RX ADMIN — MICONAZOLE NITRATE: 20 CREAM TOPICAL at 09:37

## 2024-04-18 RX ADMIN — BUPROPION HYDROCHLORIDE 100 MG: 100 TABLET, FILM COATED, EXTENDED RELEASE ORAL at 22:22

## 2024-04-18 RX ADMIN — LEVALBUTEROL HYDROCHLORIDE 1.25 MG: 1.25 SOLUTION RESPIRATORY (INHALATION) at 14:45

## 2024-04-18 RX ADMIN — Medication 25 MCG: at 09:36

## 2024-04-18 RX ADMIN — METHYLPREDNISOLONE SODIUM SUCCINATE 40 MG: 40 INJECTION, POWDER, FOR SOLUTION INTRAMUSCULAR; INTRAVENOUS at 06:21

## 2024-04-18 RX ADMIN — APIXABAN 2.5 MG: 2.5 TABLET, FILM COATED ORAL at 21:28

## 2024-04-18 RX ADMIN — APIXABAN 2.5 MG: 2.5 TABLET, FILM COATED ORAL at 09:36

## 2024-04-18 RX ADMIN — DULOXETINE HYDROCHLORIDE 60 MG: 60 CAPSULE, DELAYED RELEASE ORAL at 09:36

## 2024-04-18 RX ADMIN — GUAIFENESIN 200 MG: 200 SOLUTION ORAL at 13:44

## 2024-04-18 RX ADMIN — ACETAMINOPHEN 650 MG: 325 TABLET, FILM COATED ORAL at 22:22

## 2024-04-18 RX ADMIN — FUROSEMIDE 60 MG: 10 INJECTION, SOLUTION INTRAMUSCULAR; INTRAVENOUS at 09:38

## 2024-04-18 RX ADMIN — MICONAZOLE NITRATE: 20 CREAM TOPICAL at 21:29

## 2024-04-18 RX ADMIN — GUAIFENESIN 200 MG: 200 SOLUTION ORAL at 22:24

## 2024-04-18 RX ADMIN — LEVALBUTEROL HYDROCHLORIDE 1.25 MG: 1.25 SOLUTION RESPIRATORY (INHALATION) at 07:30

## 2024-04-18 RX ADMIN — LEVALBUTEROL HYDROCHLORIDE 1.25 MG: 1.25 SOLUTION RESPIRATORY (INHALATION) at 11:18

## 2024-04-18 RX ADMIN — ATORVASTATIN CALCIUM 20 MG: 20 TABLET, FILM COATED ORAL at 21:28

## 2024-04-18 RX ADMIN — PANTOPRAZOLE SODIUM 20 MG: 20 TABLET, DELAYED RELEASE ORAL at 09:36

## 2024-04-18 RX ADMIN — LEVALBUTEROL HYDROCHLORIDE 1.25 MG: 1.25 SOLUTION RESPIRATORY (INHALATION) at 23:43

## 2024-04-18 RX ADMIN — AZITHROMYCIN MONOHYDRATE 250 MG: 500 INJECTION, POWDER, LYOPHILIZED, FOR SOLUTION INTRAVENOUS at 17:54

## 2024-04-18 RX ADMIN — LEVALBUTEROL HYDROCHLORIDE 1.25 MG: 1.25 SOLUTION RESPIRATORY (INHALATION) at 19:57

## 2024-04-18 RX ADMIN — METOPROLOL TARTRATE 75 MG: 50 TABLET, FILM COATED ORAL at 21:28

## 2024-04-18 RX ADMIN — FEXOFENADINE HCL 180 MG: 180 TABLET ORAL at 22:22

## 2024-04-18 RX ADMIN — METOPROLOL TARTRATE 75 MG: 50 TABLET, FILM COATED ORAL at 09:36

## 2024-04-18 ASSESSMENT — ACTIVITIES OF DAILY LIVING (ADL)
ADLS_ACUITY_SCORE: 37
ADLS_ACUITY_SCORE: 41
ADLS_ACUITY_SCORE: 41
ADLS_ACUITY_SCORE: 37
ADLS_ACUITY_SCORE: 41
ADLS_ACUITY_SCORE: 37
ADLS_ACUITY_SCORE: 41
ADLS_ACUITY_SCORE: 41
ADLS_ACUITY_SCORE: 37
ADLS_ACUITY_SCORE: 41
ADLS_ACUITY_SCORE: 37
ADLS_ACUITY_SCORE: 37
ADLS_ACUITY_SCORE: 41
ADLS_ACUITY_SCORE: 37
ADLS_ACUITY_SCORE: 41
ADLS_ACUITY_SCORE: 41
ADLS_ACUITY_SCORE: 37
ADLS_ACUITY_SCORE: 41
ADLS_ACUITY_SCORE: 37
ADLS_ACUITY_SCORE: 41

## 2024-04-18 NOTE — PROGRESS NOTES
"[facility]    Hospitalist Progress Note    Assessment & Plan   Date of Admission:  4/14/2024        Assessment & Plan  Moira Andre is a 89 year old female admitted on 4/14/2024. She has h/o HFpEF, Afib (eliquis), CAD (Stents 2007), DMII (glimepiride), [place]IG[place], [place], Ventral hernia, COPD/Asthma,      She presents with complaints of escalating wheezing/HENDERSON and cough .   Past 3 days or so.   SHe came from her apartment where she lives independently with her cat.   EMS noted O2sats in 80's, gave her duenebs X 2 on way to ER.  In ER, , started diltiazem gtt.   BP stable.        She presented similarly in 12/2023 -- but this on this presentation,  she also has a cough and wheezing.   Denies med non compliance.  No chest pain.  No increasing edema.  No GI complaints.           Afib RVR   (During 11/2023 admission, metoprolol increased to 75 bid)- resolved   Chronic atrial fibrillation   Anticoagulated  HFpEF, decompensated  BNP 4000   Lactic Acidosis   2.7 > 2.0 ...  Elevated troponin (35 -  this is \"baseline\" per review of chart)--> 42, 33  Aortic Stenosis, moderate    Mitral Valve Stenosis, mild, with severe calcification    CAD (OM1 and diagonal stents 2007, angiograms 2013, 2017 showed patency)  -initiated on dilt gtt and iv/po metoprolol   -cr up with iv lasix X 2 on admission   -K and Mg at goal  - HR controlled on dilt gtt, stopped am 4/15  -lactic acid normalized  -flat troponin trend  -driven/ppt'd by copd exacerbation likely  -cardiology consulted and signed off and no further recs. Continue with PTA metoprolol. They reviewed echo and no concerning findings needing follow.   -Echo  -technically extremely difficult study as previously reported. Last  echocardiogram was 3-1/2 months ago.  -Normal LV size and systolic function.   -Study is inadequate to accurately assess  wall motion.  -possible mild to moderate hypokinesis of the mid anteroseptum on contrasted images.  - right ventricle " is not well-seen. Probably borderline dilated. RV systolic  function is mildly/mild to moderately reduced.  -Aortic valve is difficult to visualize. probably mild to moderate  aortic stenosis with a mean gradient of 14 to 18 mmHg. Valve area is likely  underestimated due to reduce stroke-volume index.  - severe thickening of the mitral valve and mitral calcification noted.  Mean inflow gradient across the mitral valve ranging from 6 to 9 mmHg  consistent with moderate mitral stenosis.  Doppler suggest left to right interatrial shunt.  IVC is normal without pericardial effusion.    -remains in rate controlled afib.   -4/16. Worsening TIFFANI, new AGMA, elevated lactate, increase in BNP  -Fena <1%, increase in Bilateral crackles. Possible TIFFANI prerenal 2/2 to CHF.  No response of bmp to fluids on 4/16,   - repeat CXR 2V showing worsening pulmonary edema. No desats, still on 2-3L NC  -fluids stopped  - restarted on lasix 40mg IV   -good UO 1400cc UO 4/17  -4/17- feels better. Less crackles on exam.   Sob seems better. No wheezing   Lactate level wnl at 1.6.   4/18. Good UO with iv lasix, wt down   I/O -1.5 liters   Cr stable but not improving with diuresis  Bun rising.   Afib rate controlled   Difficult volume exam. Bilateral crackles chf vs pneumonia vs both         Plan;   -wean off oxygen as able.   - lasix 60mg IV X 1 now and reeval in am.   -consider repeat echo to check IVC on 4/19.   - am bmp.   - follow for need of cardiology reconsult  -- continue pta metoprolol, eliquis, atorvastatin  - strict I&O  - tele   -cardiology follow up outpatient   - Telemetry  - treat copd exacerbation    Addendum; doing well this afternoon. Off oxygen. Crackles sound reduced further. Good UO per RN- ++ incontinence. Will hold off on further lasix today. Reeval in am      Acute Respiratory Failure with Hypoxia   Viral pneumonia: Human Metapneumovirus  Possible superimposed bacterial pneumonia   COPD/Asthma  Wheezing  Cough     Bilateral  "Infultrates: acute CHF vs pneumonia vs both   - Covid/Influenza/RSV screening (-)  -dry cough, productive initially, 4-5 days PTA. Some crackles bibasilar. Chf, possible pneumonia  - 4/14/24 portable CXR is poor quality, likely shows CHF.     - procalcitonin 0.12  - due to new cough and hypoxia, empirically started Abx (ceftriaxone/azithromycin) for possible CAP on admission contributing  - scheduled duonebs -- re assess daily and change to prn when appropriate.   - solumedrol IV  (hold pta Wixela inhaler during steroid therapy)  - assess for tapering daily     - of note, she was discharged 12/2023 with supplemental O2.  She states it \"burned her nose\" so she never really used it.  The tank was too heavy to be used with ambulation  Called oxygen company to cancel \"They never picked it up, still there\".     -briefly needed bipap night of admission    4/15, Feeling better, less sob. Still with bilateral wheezing. Oxygen needs decreasing  4/16: feeling better. Wheezing resolved in am but recurrent in afternoon. Bid steroids continued. Possible cardiac wheezing, iv lasix diuresis resumed   4/17. No wheezing today. Crackles reduced.   Wbc up but suspect from steroids. Dry cough. Up in [place] today walking  Viral swab: Human Metapneumovirus-- supportive care  4/18: no wheezing. Feeling sob better, still with bilateral crackles  Wt down, Cr stable with diuresis.   Slow recovering   Cont to have bilateral crackles. CHF vs pneumonia vs both  Challenging volume exam.       Plan-  -cont with IV diuresis today   -stop IMC  -transfer to cardiac tele  -cont abx to complete scheduled course   -follow oxygen needs  - IS, mobilize   - Xopenex q4 hours for now  -change solumedrol to prednisone 40mg every day   -cpap      CKD3b  AGMA- resolved   Creatinine is 1.63 - at her baseline.   -CR 1.8 am 4/15 after lasix 40mg IV X 2 day of admission  -on Torsemide 20mg bid PTA  - cr up to 2.0 with rise in bun   -+ AGMA  -UA grossly abnormal. ? " ATN  - ucx negative   -nl PVRs  -not improve with fluid trial 4/16  -initiated iv lasix for acute CHF concern    PLan;   Daily bmp while diuresing.   Cr stable since 4/16 with lasix iv          Nephrolithiasis  - per 11/2023 CT, she has 1 cm stone in R renal pelvis, nonobstructive  - no  complaints  - check UA if  complaints arise.  -  No elective intervention planned (decided per her PCP and urology), due to multiple co morbidities.      -UA abnl for TIFFANI work up 4/16 but not flank pain           DMII  A1C = 7.7 2/22/2024  - holding pta glimepiride  -started on lantus, prandial aspart and ISS, dose adjustment   -BS down 100 range with increase in prandial aspart  Plan-  -hold glimepiride in hospital  -follow BS qid  -prandial aspart increased to 1 unit per 4 gram carbs  -lantus 5 units qAM, adjust as needed.   - high intensity ISS   -dm diet  - pcp follow up   -stop nph as stopping pm steroids        Large ventral hernia  - affects her mobility and accessory muscle use  - not surgical candidate  -NT, no obstructive sxs    Bilateral Knee replacements   - affects mobility   -uses walker at home   -PT eval     ELIGIO  Her cpap machine is broken, has appt to fix  Wearing cpap every night in hosital      PT/OT/CC consults to help with dispo planning.       Tinea-  Breast and groin folds  - miconazole cream to rash bid.               Diet: dm diet  DVT Prophylaxis: DOAC  Charles Catheter: Not present  Lines: None     Cardiac Monitoring: ACTIVE order. Indication: Tachyarrhythmias, acute (48 hours)  Code Status: Full Code          Clinically Significant Risk Factors Present on Admission[]Expand by Default               # Drug Induced Coagulation Defect: home medication list includes an anticoagulant medication    # Hypertension: Noted on problem list  # Acute heart failure with preserved ejection fraction: heart failure noted on problem list, last echo with EF >50%, and receiving IV diuretics    # DMII: A1C = 7.7 % (Ref  "range: 0.0 - 5.6 %) within past 6 months    # Severe Obesity: Estimated body mass index is 41.5 kg/m  as calculated from the following:    Height as of this encounter: 1.702 m (5' 7\").    Weight as of this encounter: 120.2 kg (265 lb).       # Financial/Environmental Concerns:    # Asthma: noted on problem list               Disposition Plan     Medically Ready for Discharge: Anticipated in 2  Days      PTOT, SW eval.  Needs tcu, uses walker at home.   Pcp follow up      complex complexity, >55 on care coord with rn,cardiology, chart review, charting., exam X2 , , counseling patient    Tommy Hernandez MD, MD  Text Page  (7am to 6pm)  Interval History   + viral swab for Human metapneumovirus  Feels breathing is better. Cough may be better  Good UO.       -Data reviewed today: I reviewed all new labs and imaging results over the last 24 hours. I personally reviewed labs and imaging since admission    Physical Exam   Temp: 97.6  F (36.4  C) Temp src: Oral BP: 94/76 Pulse: 50   Resp: 20 SpO2: 95 % O2 Device: None (Room air) Oxygen Delivery: 2 LPM  Vitals:    04/17/24 0600 04/17/24 0830 04/18/24 0601   Weight: (!) 256.5 kg (565 lb 7.7 oz) 116.7 kg (257 lb 4.8 oz) 114.9 kg (253 lb 3.2 oz)     Vital Signs with Ranges  Temp:  [97.1  F (36.2  C)-97.6  F (36.4  C)] 97.6  F (36.4  C)  Pulse:  [50-89] 50  Resp:  [18-20] 20  BP: ()/() 94/76  SpO2:  [91 %-98 %] 95 %  I/O last 3 completed shifts:  In: 910 [P.O.:560; I.V.:350]  Out: 1850 [Urine:1850]    Constitutional: Up in chair, nad  Neck: thick. Jvp difficult to assess -not seem elevated   Respiratory:  Bibasilar crackles- present but seem better overall from 48 hours ago   -no wheezing again today   Dry coughing- less  Breathing easily   Cardiovascular: irreg irreg rhythm. 2/6 systolic murmur RUSB, not tachy  GI: ventral hernia. NT, nd  Skin/Integumen: rash bilateral groin and breast skin folds  Neuro: nl speech and mentation  Psych: nl " affect    Medications   Current Facility-Administered Medications   Medication Dose Route Frequency Provider Last Rate Last Admin    diltiazem (CARDIZEM) 125 mg in sodium chloride 0.9 % 125 mL infusion  5-15 mg/hr Intravenous Continuous Hortencia Ohara MD   Paused at 04/15/24 0032    No anticoagulants IF patient has had acute trauma/surgery or recent intracranial, GI or urinary tract bleeding.    Other DOES NOT GO TO MAR Hortencia Ohara MD        Patient is already receiving anticoagulation with heparin, enoxaparin (LOVENOX), warfarin (COUMADIN)  or other anticoagulant medication   Does not apply Continuous PRN Hortencia Ohara MD         Current Facility-Administered Medications   Medication Dose Route Frequency Provider Last Rate Last Admin    apixaban ANTICOAGULANT (ELIQUIS) tablet 2.5 mg  2.5 mg Oral BID Hortencia Ohara MD   2.5 mg at 04/18/24 0936    atorvastatin (LIPITOR) tablet 20 mg  20 mg Oral QPM Hortencia Ohara MD   20 mg at 04/17/24 1956    azithromycin (ZITHROMAX) 250 mg in sodium chloride 0.9 % 250 mL intermittent infusion  250 mg Intravenous Q24H Tommy Hernandez MD        buPROPion (WELLBUTRIN SR) 12 hr tablet 100 mg  100 mg Oral QPM Hortencia Ohara MD   100 mg at 04/17/24 1956    cefTRIAXone (ROCEPHIN) 2 g vial to attach to  ml bag for ADULTS or NS 50 ml bag for PEDS  2 g Intravenous Q24H Tommy Hernandez MD   2 g at 04/18/24 1347    DULoxetine (CYMBALTA) DR capsule 60 mg  60 mg Oral Daily Hortencia Ohara MD   60 mg at 04/18/24 0936    fexofenadine (ALLEGRA) tablet 180 mg  180 mg Oral QPM Hortencia Ohara MD   180 mg at 04/17/24 1956    insulin aspart (NovoLOG) injection (RAPID ACTING)   Subcutaneous TID w/meals Tommy Hernandez MD   14 Units at 04/18/24 1336    insulin aspart (NovoLOG) injection (RAPID ACTING)  1-10 Units Subcutaneous TID AC Tommy Hernandez MD   4 Units at 04/18/24 1337    insulin aspart (NovoLOG) injection (RAPID ACTING)  1-7  Units Subcutaneous At Bedtime Tommy Hernandez MD        insulin glargine (LANTUS PEN) injection 5 Units  5 Units Subcutaneous QAM AC Tommy Hernandez MD   5 Units at 04/18/24 0934    levalbuterol (XOPENEX) neb solution 1.25 mg  1.25 mg Nebulization Q4H Tommy Hernandez MD   1.25 mg at 04/18/24 1445    metoprolol tartrate (LOPRESSOR) tablet 75 mg  75 mg Oral BID Hortencia Ohara MD   75 mg at 04/18/24 0936    miconazole (MICATIN) 2 % cream   Topical BID Tommy Hernandez MD   Given at 04/18/24 0937    pantoprazole (PROTONIX) EC tablet 20 mg  20 mg Oral Daily Hortencia Ohara MD   20 mg at 04/18/24 0936    [START ON 4/19/2024] predniSONE (DELTASONE) tablet 40 mg  40 mg Oral Daily Tommy Hernandez MD        sodium chloride (PF) 0.9% PF flush 3 mL  3 mL Intracatheter Q8H Hortencia Ohara MD   3 mL at 04/17/24 2334    [Held by provider] torsemide (DEMADEX) tablet 40 mg  40 mg Oral Daily Hortencia Ohara MD        Vitamin D3 (CHOLECALCIFEROL) tablet 25 mcg  25 mcg Oral Daily Hortencia Ohara MD   25 mcg at 04/18/24 0936       Data   Recent Labs   Lab 04/18/24  1310 04/18/24  0802 04/18/24  0537 04/17/24  1824 04/17/24  1648 04/17/24  1248 04/17/24  0726 04/16/24  1704 04/16/24  1546 04/15/24  0958 04/15/24  0531   WBC  --   --   --   --   --   --  17.4*  --  15.7*  --  7.3   HGB  --   --   --   --   --   --  13.6  --  12.8  --  12.9   MCV  --   --   --   --   --   --  83  --  83  --  83   PLT  --   --   --   --   --   --  336  --  308  --  268   NA  --   --  138  --  132*  --  135  --   --    < > 137   POTASSIUM  --   --  4.0  --  4.1  --  4.5  --   --    < > 4.9   CHLORIDE  --   --  99  --  95*  --  97*  --   --    < > 98   CO2  --   --  25  --  21*  --  19*  --   --    < > 25   BUN  --   --  74.4*  --  71.4*  --  68.3*  --   --    < > 36.3*   CR  --   --  2.16*  --  2.07*  --  2.06*  --   --    < > 1.84*   ANIONGAP  --   --  14  --  16*  --  19*  --   --    < > 14   TELLY  --   --  8.9  --   8.8  --  9.1  --   --    < > 9.0   * 150* 141*   < > 218*   < > 191*   < >  --    < > 249*    < > = values in this interval not displayed.       Imaging:   No results found for this or any previous visit (from the past 24 hour(s)).

## 2024-04-18 NOTE — PLAN OF CARE
Neuro: A&Ox4  Tele/Cardiac: A-fib CVR  Resp: 2L NC while awake, CPAP at night. Frequent non productive cough  Activity: SBA GB/walker  Pain: denies pain  Drips/IV: SL  GI/: irregular contour, 2 senna given- last BM 4/14. No insulin coverage overnight  Skin: redness under R breast and groin area  Diet: Diet: Moderate Consistent Carb (60 g CHO per Meal) Diet     Test/Procedures: n/a  Plan: discharge to TCU when medically stable.  Continue to diurese and monitor fluid status.

## 2024-04-18 NOTE — PROVIDER NOTIFICATION
Paged Dr. Witt at 2045 via [employer] message    FYI: respiratory panel positive for human metapneumovirus. Contact/droplet precautions initiated. Please adviseHamida [phone]    Response:  -no further interventions.

## 2024-04-18 NOTE — PLAN OF CARE
VSS, weaned to Room air. Incont of urine. 1 dose of IV lasix given today. States she is feeling better. Occasional cough, PRN guafenisin. Up with 1 in room.

## 2024-04-18 NOTE — PROGRESS NOTES
Care Management Follow Up    Length of Stay (days): 4    Expected Discharge Date: 04/19/2024     Concerns to be Addressed:       Patient plan of care discussed at interdisciplinary rounds: Yes    Anticipated Discharge Disposition:  (TCU)     Anticipated Discharge Services:  Post acute Facility   Anticipated Discharge DME:  N/A    Patient/family educated on Medicare website which has current facility and service quality ratings:  yes  Education Provided on the Discharge Plan:  yes  Patient/Family in Agreement with the Plan: yes    Referrals Placed by CM/SW:  TCU referrals   Private pay costs discussed: Not applicable    Additional Information:  Confirmed with Dr. Hernandez that patient is not ready at this time and may be ready Saturday. Writer discussed with Jeanette at [facility]. Confirmed that they will accept patient on Saturday.     FABRICIO Galvan, LGSW   Social Work   [facility]

## 2024-04-19 ENCOUNTER — APPOINTMENT (OUTPATIENT)
Dept: CARDIOLOGY | Facility: CLINIC | Age: 89
DRG: 291 | End: 2024-04-19
Attending: INTERNAL MEDICINE
Payer: COMMERCIAL

## 2024-04-19 ENCOUNTER — APPOINTMENT (OUTPATIENT)
Dept: OCCUPATIONAL THERAPY | Facility: CLINIC | Age: 89
DRG: 291 | End: 2024-04-19
Payer: COMMERCIAL

## 2024-04-19 ENCOUNTER — APPOINTMENT (OUTPATIENT)
Dept: PHYSICAL THERAPY | Facility: CLINIC | Age: 89
DRG: 291 | End: 2024-04-19
Payer: COMMERCIAL

## 2024-04-19 LAB
ANION GAP SERPL CALCULATED.3IONS-SCNC: 12 MMOL/L (ref 7–15)
BUN SERPL-MCNC: 73.7 MG/DL (ref 8–23)
CALCIUM SERPL-MCNC: 8.9 MG/DL (ref 8.2–9.6)
CHLORIDE SERPL-SCNC: 98 MMOL/L (ref 98–107)
CREAT SERPL-MCNC: 2.02 MG/DL (ref 0.51–0.95)
DEPRECATED HCO3 PLAS-SCNC: 29 MMOL/L (ref 22–29)
EGFRCR SERPLBLD CKD-EPI 2021: 23 ML/MIN/1.73M2
ERYTHROCYTE [DISTWIDTH] IN BLOOD BY AUTOMATED COUNT: 16.4 % (ref 10–15)
GLUCOSE BLDC GLUCOMTR-MCNC: 107 MG/DL (ref 70–99)
GLUCOSE BLDC GLUCOMTR-MCNC: 173 MG/DL (ref 70–99)
GLUCOSE BLDC GLUCOMTR-MCNC: 217 MG/DL (ref 70–99)
GLUCOSE BLDC GLUCOMTR-MCNC: 234 MG/DL (ref 70–99)
GLUCOSE BLDC GLUCOMTR-MCNC: 70 MG/DL (ref 70–99)
GLUCOSE BLDC GLUCOMTR-MCNC: 75 MG/DL (ref 70–99)
GLUCOSE BLDC GLUCOMTR-MCNC: 96 MG/DL (ref 70–99)
GLUCOSE SERPL-MCNC: 100 MG/DL (ref 70–99)
HCT VFR BLD AUTO: 46.1 % (ref 35–47)
HGB BLD-MCNC: 14 G/DL (ref 11.7–15.7)
LVEF ECHO: NORMAL
MAGNESIUM SERPL-MCNC: 2.1 MG/DL (ref 1.7–2.3)
MCH RBC QN AUTO: 25.1 PG (ref 26.5–33)
MCHC RBC AUTO-ENTMCNC: 30.4 G/DL (ref 31.5–36.5)
MCV RBC AUTO: 83 FL (ref 78–100)
NT-PROBNP SERPL-MCNC: 5685 PG/ML (ref 0–1800)
PLATELET # BLD AUTO: 340 10E3/UL (ref 150–450)
POTASSIUM SERPL-SCNC: 3.8 MMOL/L (ref 3.4–5.3)
RBC # BLD AUTO: 5.58 10E6/UL (ref 3.8–5.2)
SODIUM SERPL-SCNC: 139 MMOL/L (ref 135–145)
WBC # BLD AUTO: 14.6 10E3/UL (ref 4–11)

## 2024-04-19 PROCEDURE — 94640 AIRWAY INHALATION TREATMENT: CPT

## 2024-04-19 PROCEDURE — 36415 COLL VENOUS BLD VENIPUNCTURE: CPT | Performed by: INTERNAL MEDICINE

## 2024-04-19 PROCEDURE — 94660 CPAP INITIATION&MGMT: CPT

## 2024-04-19 PROCEDURE — 94640 AIRWAY INHALATION TREATMENT: CPT | Mod: 76

## 2024-04-19 PROCEDURE — 210N000002 HC R&B HEART CARE

## 2024-04-19 PROCEDURE — 99233 SBSQ HOSP IP/OBS HIGH 50: CPT | Performed by: INTERNAL MEDICINE

## 2024-04-19 PROCEDURE — 93005 ELECTROCARDIOGRAM TRACING: CPT

## 2024-04-19 PROCEDURE — 93010 ELECTROCARDIOGRAM REPORT: CPT | Performed by: INTERNAL MEDICINE

## 2024-04-19 PROCEDURE — 250N000012 HC RX MED GY IP 250 OP 636 PS 637: Performed by: INTERNAL MEDICINE

## 2024-04-19 PROCEDURE — 250N000011 HC RX IP 250 OP 636: Performed by: INTERNAL MEDICINE

## 2024-04-19 PROCEDURE — 93308 TTE F-UP OR LMTD: CPT

## 2024-04-19 PROCEDURE — 83735 ASSAY OF MAGNESIUM: CPT | Performed by: INTERNAL MEDICINE

## 2024-04-19 PROCEDURE — 250N000013 HC RX MED GY IP 250 OP 250 PS 637: Performed by: INTERNAL MEDICINE

## 2024-04-19 PROCEDURE — 83880 ASSAY OF NATRIURETIC PEPTIDE: CPT | Performed by: INTERNAL MEDICINE

## 2024-04-19 PROCEDURE — 250N000009 HC RX 250: Performed by: INTERNAL MEDICINE

## 2024-04-19 PROCEDURE — 999N000157 HC STATISTIC RCP TIME EA 10 MIN

## 2024-04-19 PROCEDURE — 97530 THERAPEUTIC ACTIVITIES: CPT | Mod: GP

## 2024-04-19 PROCEDURE — 97535 SELF CARE MNGMENT TRAINING: CPT | Mod: GO

## 2024-04-19 PROCEDURE — 85027 COMPLETE CBC AUTOMATED: CPT | Performed by: INTERNAL MEDICINE

## 2024-04-19 PROCEDURE — 93308 TTE F-UP OR LMTD: CPT | Mod: 26 | Performed by: INTERNAL MEDICINE

## 2024-04-19 PROCEDURE — 80048 BASIC METABOLIC PNL TOTAL CA: CPT | Performed by: INTERNAL MEDICINE

## 2024-04-19 RX ORDER — MICONAZOLE NITRATE 20 MG/G
CREAM TOPICAL 2 TIMES DAILY
DISCHARGE
Start: 2024-04-19 | End: 2024-04-22

## 2024-04-19 RX ORDER — LEVALBUTEROL 1.25 MG/.5ML
1.25 SOLUTION, CONCENTRATE RESPIRATORY (INHALATION) EVERY 6 HOURS PRN
DISCHARGE
Start: 2024-04-19 | End: 2024-04-22

## 2024-04-19 RX ORDER — PREDNISONE 10 MG/1
TABLET ORAL
DISCHARGE
Start: 2024-04-21 | End: 2024-04-22

## 2024-04-19 RX ADMIN — Medication 25 MCG: at 09:31

## 2024-04-19 RX ADMIN — BUPROPION HYDROCHLORIDE 100 MG: 100 TABLET, FILM COATED, EXTENDED RELEASE ORAL at 20:07

## 2024-04-19 RX ADMIN — LEVALBUTEROL HYDROCHLORIDE 1.25 MG: 1.25 SOLUTION RESPIRATORY (INHALATION) at 11:15

## 2024-04-19 RX ADMIN — FEXOFENADINE HCL 180 MG: 180 TABLET ORAL at 20:07

## 2024-04-19 RX ADMIN — LEVALBUTEROL HYDROCHLORIDE 1.25 MG: 1.25 SOLUTION RESPIRATORY (INHALATION) at 03:04

## 2024-04-19 RX ADMIN — MICONAZOLE NITRATE: 20 CREAM TOPICAL at 09:35

## 2024-04-19 RX ADMIN — LEVALBUTEROL HYDROCHLORIDE 1.25 MG: 1.25 SOLUTION RESPIRATORY (INHALATION) at 19:17

## 2024-04-19 RX ADMIN — APIXABAN 2.5 MG: 2.5 TABLET, FILM COATED ORAL at 20:07

## 2024-04-19 RX ADMIN — GUAIFENESIN 200 MG: 200 SOLUTION ORAL at 14:44

## 2024-04-19 RX ADMIN — LEVALBUTEROL HYDROCHLORIDE 1.25 MG: 1.25 SOLUTION RESPIRATORY (INHALATION) at 07:35

## 2024-04-19 RX ADMIN — PREDNISONE 40 MG: 10 TABLET ORAL at 09:30

## 2024-04-19 RX ADMIN — APIXABAN 2.5 MG: 2.5 TABLET, FILM COATED ORAL at 09:31

## 2024-04-19 RX ADMIN — CALCIUM CARBONATE (ANTACID) CHEW TAB 500 MG 1000 MG: 500 CHEW TAB at 17:03

## 2024-04-19 RX ADMIN — MICONAZOLE NITRATE: 20 CREAM TOPICAL at 21:46

## 2024-04-19 RX ADMIN — DULOXETINE HYDROCHLORIDE 60 MG: 60 CAPSULE, DELAYED RELEASE ORAL at 09:33

## 2024-04-19 RX ADMIN — PANTOPRAZOLE SODIUM 20 MG: 20 TABLET, DELAYED RELEASE ORAL at 09:31

## 2024-04-19 RX ADMIN — ATORVASTATIN CALCIUM 20 MG: 20 TABLET, FILM COATED ORAL at 20:07

## 2024-04-19 RX ADMIN — CEFTRIAXONE SODIUM 2 G: 2 INJECTION, POWDER, FOR SOLUTION INTRAMUSCULAR; INTRAVENOUS at 14:40

## 2024-04-19 RX ADMIN — METOPROLOL TARTRATE 75 MG: 50 TABLET, FILM COATED ORAL at 20:07

## 2024-04-19 RX ADMIN — METOPROLOL TARTRATE 75 MG: 50 TABLET, FILM COATED ORAL at 09:30

## 2024-04-19 ASSESSMENT — ACTIVITIES OF DAILY LIVING (ADL)
ADLS_ACUITY_SCORE: 37
ADLS_ACUITY_SCORE: 41
ADLS_ACUITY_SCORE: 41
ADLS_ACUITY_SCORE: 37
ADLS_ACUITY_SCORE: 41
ADLS_ACUITY_SCORE: 37
ADLS_ACUITY_SCORE: 41
ADLS_ACUITY_SCORE: 37
ADLS_ACUITY_SCORE: 37

## 2024-04-19 NOTE — PROGRESS NOTES
Patient has been assessed for Home Oxygen needs. Oxygen readings:    *Pulse oximetry (SpO2) = 98% on room air at rest while awake.    *SpO2 improved to 98% on 0 liters/minute at rest.    *SpO2 = 78% on room air during activity/with exercise.    *SpO2 improved to 92% on 4liters/minute during activity/with exercise.

## 2024-04-19 NOTE — PROGRESS NOTES
"[facility]    Hospitalist Progress Note    Assessment & Plan   Date of Admission:  4/14/2024        Assessment & Plan  Moira Andre is a 89 year old female admitted on 4/14/2024. She has h/o HFpEF, Afib (eliquis), CAD (Stents 2007), DMII (glimepiride), ELIGIO, [place], Ventral hernia, COPD/Asthma,      She presents with complaints of escalating wheezing/HENDERSON and cough .   Past 3 days or so.   SHe came from her apartment where she lives independently with her cat.   EMS noted O2sats in 80's, gave her duenebs X 2 on way to ER.  In ER, , started diltiazem gtt.   BP stable.        She presented similarly in 12/2023 -- but this on this presentation,  she also has a cough and wheezing.   Denies med non compliance.  No chest pain.  No increasing edema.  No GI complaints.           Afib RVR   (During 11/2023 admission, metoprolol increased to 75 bid)- resolved   Chronic atrial fibrillation   Anticoagulated  HFpEF, decompensated  BNP 4000   Lactic Acidosis   2.7 > 2.0 ...  Elevated troponin (35 -  this is \"baseline\" per review of chart)--> 42, 33  Aortic Stenosis, moderate    Mitral Valve Stenosis, mild, with severe calcification    CAD (OM1 and diagonal stents 2007, angiograms 2013, 2017 showed patency)  -initiated on dilt gtt and iv/po metoprolol   -cr up with iv lasix X 2 on admission   -K and Mg at goal  - HR controlled on dilt gtt, stopped am 4/15  -lactic acid normalized  -flat troponin trend  -driven/ppt'd by copd exacerbation likely  -cardiology consulted and signed off and no further recs. Continue with PTA metoprolol. They reviewed echo and no concerning findings needing follow.   -Echo  -technically extremely difficult study as previously reported. Last  echocardiogram was 3-1/2 months ago.  -Normal LV size and systolic function.   -Study is inadequate to accurately assess wall motion.  -possible mild to moderate hypokinesis of the mid anteroseptum on contrasted images.  - right ventricle " is not well-seen. Probably borderline dilated. RV systolic  function is mildly/mild to moderately reduced.  -Aortic valve is difficult to visualize. probably mild to moderate  aortic stenosis with a mean gradient of 14 to 18 mmHg. Valve area is likely  underestimated due to reduce stroke-volume index.  - severe thickening of the mitral valve and mitral calcification noted.  Mean inflow gradient across the mitral valve ranging from 6 to 9 mmHg  consistent with moderate mitral stenosis.  Doppler suggest left to right interatrial shunt.  IVC is normal without pericardial effusion.    -remains in rate controlled afib.     -4/16. Worsening TIFFANI, new AGMA, fluid trial but no improvement in bmp. elevated lactate, increase in BNP  -Fena <1%, increase in Bilateral crackles. Possible TIFFANI prerenal 2/2 to CHF.  repeat CXR 2V showing worsening pulmonary edema. No desats, still on 2-3L NC, fluids stopped. BNP 5k --> 8K, restarted on lasix 40mg IV for chf possible cardiorenal syndrome.    -good UO 1400cc UO 4/17 4/19;  -have continued to treat for component of CHF since pm 4/16, no clear improvement in Cr with iv lasix ~ bid. I/O not accurate given incontinence but suspect good response to IV lasix. Over the last 48 hours she has clearly felt less sob while on iv lasix, decrease in crackles but do persist, weaned off oxygen and clinically doing better overall. Wheezing nearly resolved. May be copd exacerbation with possible cardiac wheezing  Difficult volume exam. Bilateral crackles chf vs pneumonia vs both   I suspect she is euvolemic at this time, still with crackles but that may he her viral +/- viral pneumonia          Plan;   -Limited echo today to check IVC, if flat then resume PtA po lasix in am. If suggestive of volume up then will resume IV lasix every day or restart PTA diuretic   - am bmp.   - follow for need of cardiology reconsult (discussed with cardiology on 4/18)  -- continue pta metoprolol, eliquis,  "atorvastatin  - strict I&O  - tele   -cardiology follow up outpatient   - Telemetry  - treat copd exacerbation    Addendum; 1700  Echo today to eval IVC does not suggest volume overload.   Will hold diuretic today as pt doing well. Bun/cr are up and may be intravascularly dry. Crackles on exam may be at this point 2/2 viral pneumonia  Reeval bmp in am. Possible restart PtA diuretic tomorrow or in 2 days.  Discharge orders started.   TCU Sunday likely   -reassess need for isolation before discharge. Pt clearly feeling better. Discharge orders include isolation at this time  Updated pt's dtr       Acute Respiratory Failure with Hypoxia   Viral pneumonia: Human Metapneumovirus  Possible superimposed bacterial pneumonia   COPD/Asthma  Wheezing  Cough     Bilateral Infultrates: acute CHF vs pneumonia vs both   - Covid/Influenza/RSV screening (-)  -dry cough, productive initially, 4-5 days PTA. Some crackles bibasilar. Chf, possible pneumonia  - 4/14/24 portable CXR is poor quality, likely shows CHF.     - procalcitonin 0.12  - due to new cough and hypoxia, empirically started Abx (ceftriaxone/azithromycin) for possible CAP on admission contributing  - scheduled duonebs -- re assess daily and change to prn when appropriate.   - initiated on solumedrol IV     - of note, she was discharged 12/2023 with supplemental O2.  She states it \"burned her nose\" so she never really used it.  The tank was too heavy to be used with ambulation  Called oxygen company to cancel \"They never picked it up, still there\".       -briefly needed bipap night of admission    - pt has improved daily with steroids, abx and neb Rx, dry cough, wheezing, sob and crackles all improved   - weaned off oxygen.   - working with PT, up walking in [place]  - steroid dose tapered.   -Wbc up but suspect from steroids. Now trending down  - dry cough and wheezin  -Viral swab: Human Metapneumovirus-- supportive care, isolation  -treated for suspected coexisting CHF "   -transitioned off IMC  -weaned off oxygen      Plan-  -check sats RA walking   -cont abx to complete scheduled course see orders  - IS, mobilize   - Xopenex q4 hours for now  -change solumedrol to prednisone 40mg every day, begin taper in 1-2 days  -cpap  at bedtime      CKD3b  AGMA- resolved   Creatinine is 1.63 - at her baseline.   -CR 1.8 am 4/15 after lasix 40mg IV X 2 day of admission  -on Torsemide 20mg bid PTA  - cr up to 2.0 with rise in bun   -+ AGMA  -UA grossly abnormal. ? ATN  - ucx negative   -nl PVRs  -not improve with fluid trial 4/16  -initiated iv lasix concern for acute CHF, cardiorenal  -IV lasix since pm 4/16. Good UO given multiple saturated undergarments, I/O not accurate given incontenence  -weaned off oxygen. Suspect near euvolemia   -cr stable at 2.0 with iv lasix diuresis     PLan;   Daily bmp           Nephrolithiasis  - per 11/2023 CT, she has 1 cm stone in R renal pelvis, nonobstructive  - no  complaints  - check UA if  complaints arise.  -  No elective intervention planned (decided per her PCP and urology), due to multiple co morbidities.      -UA abnl for TIFFANI work up 4/16 but not flank pain           DMII  A1C = 7.7 2/22/2024  - holding pta glimepiride  -started on lantus, prandial aspart and ISS, dose adjustment   -BS down 100 range with increase in prandial aspart  -titrated up prandial aspart during hospital stay   Plan-  -hold glimepiride in hospital  -follow BS qid  -prandial aspart increased to 1 unit per 4 gram carbs--> will decrease to 1 unit per 6 gram carb as low BS am of 4/19 (decreased steroids on 4/18)  -lantus 5 units qAM, adjust as needed.   - high intensity ISS   -dm diet  - pcp follow up   -stopped nph as stopping pm steroids        Large ventral hernia  - affects her mobility and accessory muscle use  - not surgical candidate  -NT, no obstructive sxs during stay    Bilateral Knee replacements   - affects mobility   -uses walker at home   -PT eval     ELIGIO  Her  "cpap machine is broken, has appt to fix  Wearing cpap every night in hosital      PT/OT/CC consults to help with dispo planning.       Tinea-  Breast and groin folds  - miconazole cream to rash bid.               Diet: dm diet  DVT Prophylaxis: DOAC  Charles Catheter: Not present  Lines: None     Cardiac Monitoring: ACTIVE order. Indication: Tachyarrhythmias, acute (48 hours)  Code Status: Full Code          Clinically Significant Risk Factors Present on Admission[]Expand by Default               # Drug Induced Coagulation Defect: home medication list includes an anticoagulant medication    # Hypertension: Noted on problem list  # Acute heart failure with preserved ejection fraction: heart failure noted on problem list, last echo with EF >50%, and receiving IV diuretics    # DMII: A1C = 7.7 % (Ref range: 0.0 - 5.6 %) within past 6 months    # Severe Obesity: Estimated body mass index is 41.5 kg/m  as calculated from the following:    Height as of this encounter: 1.702 m (5' 7\").    Weight as of this encounter: 120.2 kg (265 lb).       # Financial/Environmental Concerns:    # Asthma: noted on problem list               Disposition Plan     Medically Ready for Discharge: Anticipated in 2  Days- Sunday 4/21, updated SW      PTOT, SW eval.  Needs tcu, uses walker at home.   Pcp follow up     Pt agrees to tcu.      Moderate complexity, >35 on care coord with rn,YANIQUE, chart review, charting., exam , , counseling patient    Tommy Hernandez MD, MD  Text Page  (7am to 6pm)  Interval History   Feeling better daily. Less cough. Less wheezing.   Sob improving daily   Remains off oxygen  Agrees to tcu   Taking po     -Data reviewed today: I reviewed all new labs and imaging results over the last 24 hours. I personally reviewed labs and imaging since admission    Physical Exam   Temp: 97.6  F (36.4  C) Temp src: Oral BP: (!) 134/92 Pulse: 82   Resp: 18 SpO2: 92 % O2 Device: None (Room air)    Vitals:    04/17/24 0830 " 04/18/24 0601 04/19/24 0607   Weight: 116.7 kg (257 lb 4.8 oz) 114.9 kg (253 lb 3.2 oz) 115 kg (253 lb 8.5 oz)     Vital Signs with Ranges  Temp:  [97.1  F (36.2  C)-97.6  F (36.4  C)] 97.6  F (36.4  C)  Pulse:  [58-82] 82  Resp:  [18-20] 18  BP: (115-153)/() 134/92  FiO2 (%):  [25 %] 25 %  SpO2:  [90 %-99 %] 92 %  I/O last 3 completed shifts:  In: -   Out: 800 [Urine:800]    Constitutional: Up in chair, nad  Neck: thick. Jvp difficult to assess -not seem elevated today  Respiratory:  Bibasilar crackles- but overall seems better from 2 days ago.  -no wheezing again today   -no coughing today   Breathing easily   Cardiovascular: irreg irreg rhythm. 2/6 systolic murmur RUSB, not tachy  GI: ventral hernia. NT, nd  Skin/Integumen: rash bilateral groin and breast skin folds  Neuro: nl speech and mentation  Psych: nl affect    Medications   Current Facility-Administered Medications   Medication Dose Route Frequency Provider Last Rate Last Admin    No anticoagulants IF patient has had acute trauma/surgery or recent intracranial, GI or urinary tract bleeding.    Other DOES NOT GO TO Hortencia Painting MD        Patient is already receiving anticoagulation with heparin, enoxaparin (LOVENOX), warfarin (COUMADIN)  or other anticoagulant medication   Does not apply Continuous PRN Hortencia Ohara MD         Current Facility-Administered Medications   Medication Dose Route Frequency Provider Last Rate Last Admin    apixaban ANTICOAGULANT (ELIQUIS) tablet 2.5 mg  2.5 mg Oral BID Hortencia Ohara MD   2.5 mg at 04/19/24 0931    atorvastatin (LIPITOR) tablet 20 mg  20 mg Oral QPM Hortencia Ohara MD   20 mg at 04/18/24 2128    buPROPion (WELLBUTRIN SR) 12 hr tablet 100 mg  100 mg Oral QPM Hortencia Ohara MD   100 mg at 04/18/24 2222    cefTRIAXone (ROCEPHIN) 2 g vial to attach to  ml bag for ADULTS or NS 50 ml bag for PEDS  2 g Intravenous Q24H Tommy Hernandez MD   2 g at 04/19/24 3229     DULoxetine (CYMBALTA) DR capsule 60 mg  60 mg Oral Daily Hortencia Ohara MD   60 mg at 04/19/24 0933    fexofenadine (ALLEGRA) tablet 180 mg  180 mg Oral QPM Hortencia Ohara MD   180 mg at 04/18/24 2222    insulin aspart (NovoLOG) injection (RAPID ACTING)   Subcutaneous TID w/meals Tommy Hernandez MD   10 Units at 04/19/24 0953    insulin aspart (NovoLOG) injection (RAPID ACTING)  1-10 Units Subcutaneous TID  Tommy Hernandez MD   2 Units at 04/19/24 1426    insulin aspart (NovoLOG) injection (RAPID ACTING)  1-7 Units Subcutaneous At Bedtime Tommy Hernandez MD        insulin glargine (LANTUS PEN) injection 5 Units  5 Units Subcutaneous QAM  Tommy Hernandez MD   5 Units at 04/19/24 0936    levalbuterol (XOPENEX) neb solution 1.25 mg  1.25 mg Nebulization Q4H Tommy Hernandez MD   1.25 mg at 04/19/24 1115    metoprolol tartrate (LOPRESSOR) tablet 75 mg  75 mg Oral BID Hortencia Ohara MD   75 mg at 04/19/24 0930    miconazole (MICATIN) 2 % cream   Topical BID Tommy Hernandez MD   Given at 04/19/24 0935    pantoprazole (PROTONIX) EC tablet 20 mg  20 mg Oral Daily Hortencia Ohara MD   20 mg at 04/19/24 0931    predniSONE (DELTASONE) tablet 40 mg  40 mg Oral Daily Tommy Hernandez MD   40 mg at 04/19/24 0930    sodium chloride (PF) 0.9% PF flush 3 mL  3 mL Intracatheter Q8H Hortencia Ohara MD   3 mL at 04/19/24 1430    [Held by provider] torsemide (DEMADEX) tablet 40 mg  40 mg Oral Daily Hortencia Ohara MD        Vitamin D3 (CHOLECALCIFEROL) tablet 25 mcg  25 mcg Oral Daily Hortencia Ohara MD   25 mcg at 04/19/24 0931       Data   Recent Labs   Lab 04/19/24  1313 04/19/24  0843 04/19/24  0617 04/18/24  0802 04/18/24  0537 04/17/24  1824 04/17/24  1648 04/17/24  1248 04/17/24  0726 04/16/24  1704 04/16/24  1546   WBC  --   --  14.6*  --   --   --   --   --  17.4*  --  15.7*   HGB  --   --  14.0  --   --   --   --   --  13.6  --  12.8   MCV  --   --  83  --   --   --    --   --  83  --  83   PLT  --   --  340  --   --   --   --   --  336  --  308   NA  --   --  139  --  138  --  132*  --  135  --   --    POTASSIUM  --   --  3.8  --  4.0  --  4.1  --  4.5  --   --    CHLORIDE  --   --  98  --  99  --  95*  --  97*  --   --    CO2  --   --  29  --  25  --  21*  --  19*  --   --    BUN  --   --  73.7*  --  74.4*  --  71.4*  --  68.3*  --   --    CR  --   --  2.02*  --  2.16*  --  2.07*  --  2.06*  --   --    ANIONGAP  --   --  12  --  14  --  16*  --  19*  --   --    TELLY  --   --  8.9  --  8.9  --  8.8  --  9.1  --   --    * 96 100*   < > 141*   < > 218*   < > 191*   < >  --     < > = values in this interval not displayed.       Imaging:   No results found for this or any previous visit (from the past 24 hour(s)).

## 2024-04-19 NOTE — PROGRESS NOTES
Care Management Follow Up    Length of Stay (days): 5    Expected Discharge Date: 04/20/2024     Concerns to be Addressed:       Patient plan of care discussed at interdisciplinary rounds: Yes    Anticipated Discharge Disposition:  (TCU)     Anticipated Discharge Services:    Anticipated Discharge DME:      Patient/family educated on Medicare website which has current facility and service quality ratings:    Education Provided on the Discharge Plan:    Patient/Family in Agreement with the Plan: yes    Referrals Placed by CM/SW:    Private pay costs discussed: Not applicable    Additional Information:  Message sent to Jeanette at [facility] to determine when the room is available tomorrow. Jeanette stated she would reach out this afternoon to give writer a time.     Addendum 1350: Message received from Jeanette stating that due to bed changes, a room is not available until Monday now. Updated from Hospitalist that patient may not be ready until Sunday. Will plan for Monday discharge to [facility] as this is the only accepting facility.     FABRICIO Galvan, LGSW   Social Work   [facility]

## 2024-04-19 NOTE — PLAN OF CARE
[id number]  Pleasant lady. Contact and Droplet precaution. Pt aox4, A1gbw, RA/CPAP and full code. Tele afib cvr. Pt denies CP and SOB. External cath in place overnight. 2am BG check was low and treated with oj and crackers. Abx rocephin added today.   Plan: Starting PO prednisone today. Waiting for TCU placement. [facility]entially [facility] on Saturday.

## 2024-04-19 NOTE — PLAN OF CARE
Patient alert and oriented x 4.  Denies any pain during this shift   Desaturates on RA with activity, requiring 4 l/min oxygen to bring o2 sat to 92%.  Complained of transient chest pain 10/10 which patient described as sharp and pressure, she was given  TUMS, conditional  12 lead EKG released.

## 2024-04-19 NOTE — PROVIDER NOTIFICATION
"MD Notification    Notified Person: MD Hernandez. P    Notified Person Name: Tommy Hernandez     Notification Date/Time: 4/19/24/ 1740    Notification Interaction:    Purpose of Notification: Update on oxygen walk on RA    Orders Received:\"k thanks, please put in narrative note for Hospitalist tomorrow to see \"    Comments:   Jes Pizarro, room number 243, update on oxygen walk on RA, Patient desaturate to 80's during walk on room air and gets dyspneic.  "

## 2024-04-20 LAB
ANION GAP SERPL CALCULATED.3IONS-SCNC: 12 MMOL/L (ref 7–15)
BUN SERPL-MCNC: 59.2 MG/DL (ref 8–23)
CALCIUM SERPL-MCNC: 9.3 MG/DL (ref 8.2–9.6)
CHLORIDE SERPL-SCNC: 100 MMOL/L (ref 98–107)
CREAT SERPL-MCNC: 1.64 MG/DL (ref 0.51–0.95)
DEPRECATED HCO3 PLAS-SCNC: 29 MMOL/L (ref 22–29)
EGFRCR SERPLBLD CKD-EPI 2021: 29 ML/MIN/1.73M2
ERYTHROCYTE [DISTWIDTH] IN BLOOD BY AUTOMATED COUNT: 16.7 % (ref 10–15)
GLUCOSE BLDC GLUCOMTR-MCNC: 101 MG/DL (ref 70–99)
GLUCOSE BLDC GLUCOMTR-MCNC: 122 MG/DL (ref 70–99)
GLUCOSE BLDC GLUCOMTR-MCNC: 130 MG/DL (ref 70–99)
GLUCOSE BLDC GLUCOMTR-MCNC: 135 MG/DL (ref 70–99)
GLUCOSE BLDC GLUCOMTR-MCNC: 225 MG/DL (ref 70–99)
GLUCOSE BLDC GLUCOMTR-MCNC: 261 MG/DL (ref 70–99)
GLUCOSE SERPL-MCNC: 105 MG/DL (ref 70–99)
HCT VFR BLD AUTO: 48.1 % (ref 35–47)
HGB BLD-MCNC: 14.7 G/DL (ref 11.7–15.7)
MAGNESIUM SERPL-MCNC: 2.2 MG/DL (ref 1.7–2.3)
MCH RBC QN AUTO: 25.2 PG (ref 26.5–33)
MCHC RBC AUTO-ENTMCNC: 30.6 G/DL (ref 31.5–36.5)
MCV RBC AUTO: 82 FL (ref 78–100)
PLATELET # BLD AUTO: 360 10E3/UL (ref 150–450)
POTASSIUM SERPL-SCNC: 4.2 MMOL/L (ref 3.4–5.3)
RBC # BLD AUTO: 5.84 10E6/UL (ref 3.8–5.2)
SODIUM SERPL-SCNC: 141 MMOL/L (ref 135–145)
WBC # BLD AUTO: 15.8 10E3/UL (ref 4–11)

## 2024-04-20 PROCEDURE — 94640 AIRWAY INHALATION TREATMENT: CPT

## 2024-04-20 PROCEDURE — 250N000012 HC RX MED GY IP 250 OP 636 PS 637: Performed by: INTERNAL MEDICINE

## 2024-04-20 PROCEDURE — 36415 COLL VENOUS BLD VENIPUNCTURE: CPT | Performed by: INTERNAL MEDICINE

## 2024-04-20 PROCEDURE — 250N000011 HC RX IP 250 OP 636: Performed by: INTERNAL MEDICINE

## 2024-04-20 PROCEDURE — 999N000157 HC STATISTIC RCP TIME EA 10 MIN

## 2024-04-20 PROCEDURE — 250N000013 HC RX MED GY IP 250 OP 250 PS 637: Performed by: INTERNAL MEDICINE

## 2024-04-20 PROCEDURE — 83735 ASSAY OF MAGNESIUM: CPT | Performed by: INTERNAL MEDICINE

## 2024-04-20 PROCEDURE — 210N000002 HC R&B HEART CARE

## 2024-04-20 PROCEDURE — 80048 BASIC METABOLIC PNL TOTAL CA: CPT | Performed by: INTERNAL MEDICINE

## 2024-04-20 PROCEDURE — 85027 COMPLETE CBC AUTOMATED: CPT | Performed by: INTERNAL MEDICINE

## 2024-04-20 PROCEDURE — 94640 AIRWAY INHALATION TREATMENT: CPT | Mod: 76

## 2024-04-20 PROCEDURE — 99233 SBSQ HOSP IP/OBS HIGH 50: CPT | Performed by: STUDENT IN AN ORGANIZED HEALTH CARE EDUCATION/TRAINING PROGRAM

## 2024-04-20 PROCEDURE — 250N000009 HC RX 250: Performed by: INTERNAL MEDICINE

## 2024-04-20 RX ADMIN — APIXABAN 2.5 MG: 2.5 TABLET, FILM COATED ORAL at 09:05

## 2024-04-20 RX ADMIN — CEFTRIAXONE SODIUM 2 G: 2 INJECTION, POWDER, FOR SOLUTION INTRAMUSCULAR; INTRAVENOUS at 14:22

## 2024-04-20 RX ADMIN — MICONAZOLE NITRATE: 20 CREAM TOPICAL at 20:59

## 2024-04-20 RX ADMIN — METOPROLOL TARTRATE 75 MG: 50 TABLET, FILM COATED ORAL at 09:04

## 2024-04-20 RX ADMIN — Medication 25 MCG: at 09:04

## 2024-04-20 RX ADMIN — LEVALBUTEROL HYDROCHLORIDE 1.25 MG: 1.25 SOLUTION RESPIRATORY (INHALATION) at 20:31

## 2024-04-20 RX ADMIN — GUAIFENESIN 200 MG: 200 SOLUTION ORAL at 07:54

## 2024-04-20 RX ADMIN — APIXABAN 2.5 MG: 2.5 TABLET, FILM COATED ORAL at 20:57

## 2024-04-20 RX ADMIN — MICONAZOLE NITRATE: 20 CREAM TOPICAL at 09:09

## 2024-04-20 RX ADMIN — BUPROPION HYDROCHLORIDE 100 MG: 100 TABLET, FILM COATED, EXTENDED RELEASE ORAL at 20:57

## 2024-04-20 RX ADMIN — PREDNISONE 40 MG: 10 TABLET ORAL at 09:04

## 2024-04-20 RX ADMIN — DULOXETINE HYDROCHLORIDE 60 MG: 60 CAPSULE, DELAYED RELEASE ORAL at 09:04

## 2024-04-20 RX ADMIN — ATORVASTATIN CALCIUM 20 MG: 20 TABLET, FILM COATED ORAL at 20:57

## 2024-04-20 RX ADMIN — FEXOFENADINE HCL 180 MG: 180 TABLET ORAL at 20:57

## 2024-04-20 RX ADMIN — LEVALBUTEROL HYDROCHLORIDE 1.25 MG: 1.25 SOLUTION RESPIRATORY (INHALATION) at 08:05

## 2024-04-20 RX ADMIN — PANTOPRAZOLE SODIUM 20 MG: 20 TABLET, DELAYED RELEASE ORAL at 09:04

## 2024-04-20 RX ADMIN — LEVALBUTEROL HYDROCHLORIDE 1.25 MG: 1.25 SOLUTION RESPIRATORY (INHALATION) at 11:47

## 2024-04-20 RX ADMIN — METOPROLOL TARTRATE 75 MG: 50 TABLET, FILM COATED ORAL at 20:57

## 2024-04-20 ASSESSMENT — ACTIVITIES OF DAILY LIVING (ADL)
ADLS_ACUITY_SCORE: 37

## 2024-04-20 NOTE — PROGRESS NOTES
"[facility]    Hospitalist Progress Note    Assessment & Plan     Date of Admission:  4/14/2024  Date of Service: 04/20/2024     Assessment & Plan    Moira Andre is a 89 year old female admitted on 4/14/2024. She has h/o HFpEF, Afib (eliquis), CAD (Stents 2007), DMII (glimepiride), ELIGIO, [place], Ventral hernia, COPD/Asthma,      She presents with complaints of escalating wheezing/HENDERSON and cough .   Past 3 days or so.   SHe came from her apartment where she lives independently with her cat.   EMS noted O2sats in 80's, gave her duenebs X 2 on way to ER.  In ER, , started diltiazem gtt.   BP stable.        She presented similarly in 12/2023 -- but this on this presentation,  she also has a cough and wheezing.   Denies med non compliance.  No chest pain.  No increasing edema.  No GI complaints.          Afib RVR   (During 11/2023 admission, metoprolol increased to 75 bid)- resolved   Chronic atrial fibrillation   Anticoagulated  HFpEF, decompensated  BNP 4000   Lactic Acidosis   2.7 > 2.0 ...  Elevated troponin (35 -  this is \"baseline\" per review of chart)--> 42, 33  Aortic Stenosis, moderate    Mitral Valve Stenosis, mild, with severe calcification    CAD (OM1 and diagonal stents 2007, angiograms 2013, 2017 showed patency)  -initiated on dilt gtt and iv/po metoprolol   -cr up with iv lasix X 2 on admission   -K and Mg at goal  - HR controlled on dilt gtt, stopped am 4/15  -lactic acid normalized  -flat troponin trend  -driven/ppt'd by copd exacerbation likely  -cardiology consulted and signed off and no further recs. Continue with PTA metoprolol. They reviewed echo and no concerning findings needing follow.   -Echo  -technically extremely difficult study as previously reported. Last  echocardiogram was 3-1/2 months ago.  -Normal LV size and systolic function.   -Study is inadequate to accurately assess wall motion.  -possible mild to moderate hypokinesis of the mid anteroseptum on " contrasted images.  - right ventricle is not well-seen. Probably borderline dilated. RV systolic  function is mildly/mild to moderately reduced.  -Aortic valve is difficult to visualize. probably mild to moderate  aortic stenosis with a mean gradient of 14 to 18 mmHg. Valve area is likely  underestimated due to reduce stroke-volume index.  - severe thickening of the mitral valve and mitral calcification noted.  Mean inflow gradient across the mitral valve ranging from 6 to 9 mmHg  consistent with moderate mitral stenosis.  Doppler suggest left to right interatrial shunt.  IVC is normal without pericardial effusion.    -remains in rate controlled afib.     -4/16. Worsening TIFFANI, new AGMA, fluid trial but no improvement in bmp. elevated lactate, increase in BNP  -Fena <1%, increase in Bilateral crackles. Possible TIFFANI prerenal 2/2 to CHF.  repeat CXR 2V showing worsening pulmonary edema. No desats, still on 2-3L NC, fluids stopped. BNP 5k --> 8K, restarted on lasix 40mg IV for chf possible cardiorenal syndrome.    -good UO 1400cc UO 4/17 4/19;  -have continued to treat for component of CHF since pm 4/16, no clear improvement in Cr with iv lasix ~ bid. I/O not accurate given incontinence but suspect good response to IV lasix. Over the last 48 hours she has clearly felt less sob while on iv lasix, decrease in crackles but do persist, weaned off oxygen and clinically doing better overall. Wheezing nearly resolved. May be copd exacerbation with possible cardiac wheezing  Difficult volume exam. Bilateral crackles chf vs pneumonia vs both   Echo reveals IVC does not suggest volume overload.   Plan:  -Limited echo today to check IVC, if flat then resume PtA po lasix in am. If suggestive of volume up then will resume IV lasix every day or restart PTA diuretic   - am bmp.   - follow for need of cardiology reconsult (discussed with cardiology on 4/18)  -- continue pta metoprolol, eliquis, atorvastatin  - strict I&O  - tele    "-cardiology follow up outpatient   - Telemetry  - treat copd exacerbation  - I suspect she is euvolemic at this time, still with crackles but that may he her viral +/- viral pneumonia  - Will hold diuretic today as pt doing well. Bun/cr are up and may be intravascularly dry. Crackles on exam may be at this point 2/2 viral pneumonia  - Re-valuate bmp in am.   - Possible restart PtA diuretic tomorrow or in 2 days.  - TCU Sunday likely   - Reassess need for isolation before discharge. Pt clearly feeling better. Discharge orders include isolation at this time     Acute Respiratory Failure with Hypoxia   Viral pneumonia: Human Metapneumovirus  Possible superimposed bacterial pneumonia   COPD/Asthma  Wheezing  Cough     Bilateral Infultrates: acute CHF vs pneumonia vs both   - Covid/Influenza/RSV screening (-)  -dry cough, productive initially, 4-5 days PTA. Some crackles bibasilar. Chf, possible pneumonia  - 4/14/24 portable CXR is poor quality, likely shows CHF.     - procalcitonin 0.12  - due to new cough and hypoxia, empirically started Abx (ceftriaxone/azithromycin) for possible CAP on admission contributing  - scheduled duonebs -- re assess daily and change to prn when appropriate.   - initiated on solumedrol IV     - of note, she was discharged 12/2023 with supplemental O2.  She states it \"burned her nose\" so she never really used it.  The tank was too heavy to be used with ambulation  Called oxygen company to cancel \"They never picked it up, still there\".     - Briefly needed bipap night of admission  Plan:  - pt has improved daily with steroids, abx and neb Rx, dry cough, wheezing, sob and crackles all improved   - weaned off oxygen.   - working with PT, up walking in [place]  - steroid dose tapered.   -Wbc up but suspect from steroids. Now trending down  - dry cough and wheezin  -Viral swab: Human Metapneumovirus-- supportive care, isolation  -treated for suspected coexisting CHF   -weaned off oxygen    Plan-  - " Check sats RA walking   - Cont abx to complete scheduled course see orders  - IS, mobilize   - Xopenex q4 hours for now  - Change solumedrol to prednisone 40mg every day, begin taper in 1-2 days  - CPAP at bedtime      CKD3b  AGMA- resolved   Creatinine is 1.63 - at her baseline.   -CR 1.8 am 4/15 after lasix 40mg IV X 2 day of admission  -on Torsemide 20mg bid PTA  - cr up to 2.0 with rise in bun   -+ AGMA  -UA grossly abnormal. ? ATN  - ucx negative   -nl PVRs  -not improve with fluid trial 4/16  -initiated iv lasix concern for acute CHF, cardiorenal  -IV lasix since pm 4/16. Good UO given multiple saturated undergarments, I/O not accurate given incontenence  -weaned off oxygen. Suspect near euvolemia   -cr stable at 2.0 with iv lasix diuresis   Plan:  Daily bmp     Nephrolithiasis  - per 11/2023 CT, she has 1 cm stone in R renal pelvis, nonobstructive  - no  complaints  - check UA if  complaints arise.  -  No elective intervention planned (decided per her PCP and urology), due to multiple co morbidities.      -UA abnl for TIFFANI work up 4/16 but not flank pain           DMII  A1C = 7.7 2/22/2024  - holding pta glimepiride  -started on lantus, prandial aspart and ISS, dose adjustment   -BS down 100 range with increase in prandial aspart  -titrated up prandial aspart during hospital stay   Plan-  -hold glimepiride in hospital  -follow BS qid  -prandial aspart increased to 1 unit per 4 gram carbs--> will decrease to 1 unit per 6 gram carb as low BS am of 4/19 (decreased steroids on 4/18)  -lantus 5 units qAM, adjust as needed.   - high intensity ISS   -dm diet  - pcp follow up   -stopped nph as stopping pm steroids        Large ventral hernia  - affects her mobility and accessory muscle use  - not surgical candidate  -NT, no obstructive sxs during stay    Bilateral Knee replacements   - affects mobility   -uses walker at home   -PT eval     ELIGIO  Her cpap machine is broken, has appt to fix  Wearing cpap every night  "in hosital      PT/OT/CC consults to help with dispo planning.       Tinea-  Breast and groin folds  - miconazole cream to rash bid.               Diet: dm diet  DVT Prophylaxis: DOAC  Charles Catheter: Not present  Lines: None     Cardiac Monitoring: ACTIVE order. Indication: Tachyarrhythmias, acute (48 hours)  Code Status: Full Code          Clinically Significant Risk Factors Present on Admission[]Expand by Default               # Drug Induced Coagulation Defect: home medication list includes an anticoagulant medication    # Hypertension: Noted on problem list  # Acute heart failure with preserved ejection fraction: heart failure noted on problem list, last echo with EF >50%, and receiving IV diuretics    # DMII: A1C = 7.7 % (Ref range: 0.0 - 5.6 %) within past 6 months    # Severe Obesity: Estimated body mass index is 41.5 kg/m  as calculated from the following:    Height as of this encounter: 1.702 m (5' 7\").    Weight as of this encounter: 120.2 kg (265 lb).       # Financial/Environmental Concerns:    # Asthma: noted on problem list               Disposition Plan     Medically Ready for Discharge: Anticipated in 2  Days- Sunday 4/21, updated SW      PTOT, SW eval.  Needs tcu, uses walker at home.   Pcp follow up     Pt agrees to tcu.      Moderate complexity, >40 on care coord with rn,YANIQUE, chart review, charting., exam , , counseling patient    Damien Dunbar MD, MD  Text Page  (7am to 6pm)  Interval History     Assumed cares and examined patient today  No acute events overnight  Feeling better daily. Less cough. Less wheezing.   SOB continues to improve and  She remains off oxygen  No fevers  No CP  No nausea / vomiting    -Data reviewed today: I reviewed all new labs and imaging results over the last 24 hours. I personally reviewed labs and imaging since admission    Physical Exam   Temp: 97.5  F (36.4  C) Temp src: Oral BP: 136/72 Pulse: 71   Resp: 18 SpO2: 93 % O2 Device: None (Room air)    Vitals: "    04/18/24 0601 04/19/24 0607 04/20/24 0429   Weight: 114.9 kg (253 lb 3.2 oz) 115 kg (253 lb 8.5 oz) 113.6 kg (250 lb 8 oz)     Vital Signs with Ranges  Temp:  [97.4  F (36.3  C)-97.7  F (36.5  C)] 97.5  F (36.4  C)  Pulse:  [71-80] 71  Resp:  [18-20] 18  BP: (136-149)/(72-97) 136/72  SpO2:  [93 %-95 %] 93 %  I/O last 3 completed shifts:  In: 563 [P.O.:460; I.V.:103]  Out: 150 [Urine:150]    Constitutional: Up in chair, nad  Neck: thick. Jvp difficult to assess -not seem elevated today  Respiratory:  Bibasilar crackles- but overall. Breathing easily   Cardiovascular: irreg irreg rhythm. 2/6 systolic murmur RUSB, not tachy  GI: ventral hernia. NT, nd  Skin/Integumen: rash bilateral groin and breast skin folds  Neuro: nl speech and mentation  Psych: nl affect    Medications   Current Facility-Administered Medications   Medication Dose Route Frequency Provider Last Rate Last Admin    No anticoagulants IF patient has had acute trauma/surgery or recent intracranial, GI or urinary tract bleeding.    Other DOES NOT GO TO Hortencia Painting MD        Patient is already receiving anticoagulation with heparin, enoxaparin (LOVENOX), warfarin (COUMADIN)  or other anticoagulant medication   Does not apply Continuous PRN Hortencia Ohara MD         Current Facility-Administered Medications   Medication Dose Route Frequency Provider Last Rate Last Admin    apixaban ANTICOAGULANT (ELIQUIS) tablet 2.5 mg  2.5 mg Oral BID Hortencia Ohara MD   2.5 mg at 04/20/24 0905    atorvastatin (LIPITOR) tablet 20 mg  20 mg Oral QPM Hortencia Ohara MD   20 mg at 04/19/24 2007    buPROPion (WELLBUTRIN SR) 12 hr tablet 100 mg  100 mg Oral QPM Hortencia Ohara MD   100 mg at 04/19/24 2007    cefTRIAXone (ROCEPHIN) 2 g vial to attach to  ml bag for ADULTS or NS 50 ml bag for PEDS  2 g Intravenous Q24H Tommy Hernandez MD   2 g at 04/19/24 1440    DULoxetine (CYMBALTA) DR capsule 60 mg  60 mg Oral Daily Mayda  Hortencia HOROWITZ MD   60 mg at 04/20/24 0904    fexofenadine (ALLEGRA) tablet 180 mg  180 mg Oral QPM Hortencia Ohara MD   180 mg at 04/19/24 2007    insulin aspart (NovoLOG) injection (RAPID ACTING)   Subcutaneous TID w/meals Tommy Hernandez MD   8 Units at 04/20/24 0907    insulin aspart (NovoLOG) injection (RAPID ACTING)  1-10 Units Subcutaneous TID AC Tommy Hernandez MD   4 Units at 04/19/24 1834    insulin aspart (NovoLOG) injection (RAPID ACTING)  1-7 Units Subcutaneous At Bedtime Tommy Hernandez MD   2 Units at 04/19/24 2146    insulin glargine (LANTUS PEN) injection 5 Units  5 Units Subcutaneous QAM AC Tommy Hernandez MD   5 Units at 04/20/24 0905    levalbuterol (XOPENEX) neb solution 1.25 mg  1.25 mg Nebulization Q4H Tommy Hernandez MD   1.25 mg at 04/20/24 0805    metoprolol tartrate (LOPRESSOR) tablet 75 mg  75 mg Oral BID Hortencia Ohara MD   75 mg at 04/20/24 0904    miconazole (MICATIN) 2 % cream   Topical BID Tommy Hernandez MD   Given at 04/20/24 0909    pantoprazole (PROTONIX) EC tablet 20 mg  20 mg Oral Daily Hortencia Ohara MD   20 mg at 04/20/24 0904    predniSONE (DELTASONE) tablet 40 mg  40 mg Oral Daily Tommy Hernandez MD   40 mg at 04/20/24 0904    sodium chloride (PF) 0.9% PF flush 3 mL  3 mL Intracatheter Q8H Hortencia Ohara MD   3 mL at 04/20/24 0552    [Held by provider] torsemide (DEMADEX) tablet 40 mg  40 mg Oral Daily Hortencia Ohara MD        Vitamin D3 (CHOLECALCIFEROL) tablet 25 mcg  25 mcg Oral Daily Hortencia Ohara MD   25 mcg at 04/20/24 0904       Data   Recent Labs   Lab 04/20/24  0836 04/20/24  0601 04/20/24  0227 04/19/24  0843 04/19/24  0617 04/18/24  0802 04/18/24  0537 04/17/24  1248 04/17/24  0726   WBC  --  15.8*  --   --  14.6*  --   --   --  17.4*   HGB  --  14.7  --   --  14.0  --   --   --  13.6   MCV  --  82  --   --  83  --   --   --  83   PLT  --  360  --   --  340  --   --   --  336   NA  --  141  --   --  139  --  138    < > 135   POTASSIUM  --  4.2  --   --  3.8  --  4.0   < > 4.5   CHLORIDE  --  100  --   --  98  --  99   < > 97*   CO2  --  29  --   --  29  --  25   < > 19*   BUN  --  59.2*  --   --  73.7*  --  74.4*   < > 68.3*   CR  --  1.64*  --   --  2.02*  --  2.16*   < > 2.06*   ANIONGAP  --  12  --   --  12  --  14   < > 19*   TELLY  --  9.3  --   --  8.9  --  8.9   < > 9.1   * 105* 130*   < > 100*   < > 141*   < > 191*    < > = values in this interval not displayed.       Imaging:   Recent Results (from the past 24 hour(s))   Echocardiogram Limited   Result Value    LVEF  55-60%    Narrative    [id number]  [id number]  [id number]  [id number]^ARIAS^PETER^RAJI     [facility]  Echocardiography Laboratory  [address] [place] [address]     Name: JOSIE ARRIAGA  MRN: [id number]  : 1933  Study Date: 2024 03:41 PM  Age: 89 yrs  Gender: Female  Patient Location: [facility]  Reason For Study: CHF  Ordering Physician: MARTINA BIANCHI  Performed By: Julia Espinoza     ______________________________________________________________________________  Procedure  Limited Echo Adult.  ______________________________________________________________________________  Interpretation Summary     The inferior vena cava was normal in size with preserved respiratory  variability.  IVC diameter <2.1 cm collapsing >50% with sniff suggests a normal RA pressure  of 3 mmHg.  ______________________________________________________________________________  Left Ventricle  The visual ejection fraction is 55-60%.     Mitral Valve  There is moderate mitral annular calcification. The mitral valve leaflets are  severely thickened.     Vessels  The inferior vena cava was normal in size with preserved respiratory  variability. IVC diameter <2.1 cm collapsing >50% with sniff suggests a normal  RA pressure of 3 mmHg.     ______________________________________________________________________________  Report approved by: Jd  Foster-Grossman, MDon 04/19/2024 04:19 PM     ______________________________________________________________________________

## 2024-04-20 NOTE — PLAN OF CARE
Goal Outcome Evaluation:  A&O x4, VSS ex SBP 140s, on sat 90s. Slight dyspnea with activities, denies SOB at rest. Encouraged pulmonary hygiene. Tele Afib w/CVR. Up with 1A/GB/W. Denies pain. Plans for possible discharge tomorrow.

## 2024-04-20 NOTE — PROGRESS NOTES
Patient has been assessed for Home Oxygen needs. Oxygen readings:    *Pulse oximetry (SpO2) = 95% on room air at rest while awake.    *SpO2 improved to 91% on 4 liters/minute at rest.    *SpO2 = 78% on room air during activity/with exercise.    *SpO2 improved to 91% on 4 liters/minute during activity/with exercise.

## 2024-04-20 NOTE — PROGRESS NOTES
Patient Alert and oriented x 4,   Dyspneic on exertion requiring oxygen at 4 liter for activity.  Frequent cough was given guaifenesin solution p.o

## 2024-04-21 LAB
ANION GAP SERPL CALCULATED.3IONS-SCNC: 17 MMOL/L (ref 7–15)
BUN SERPL-MCNC: 54.4 MG/DL (ref 8–23)
CALCIUM SERPL-MCNC: 9.2 MG/DL (ref 8.2–9.6)
CHLORIDE SERPL-SCNC: 103 MMOL/L (ref 98–107)
CREAT SERPL-MCNC: 1.5 MG/DL (ref 0.51–0.95)
DEPRECATED HCO3 PLAS-SCNC: 19 MMOL/L (ref 22–29)
EGFRCR SERPLBLD CKD-EPI 2021: 33 ML/MIN/1.73M2
ERYTHROCYTE [DISTWIDTH] IN BLOOD BY AUTOMATED COUNT: 16.7 % (ref 10–15)
GLUCOSE BLDC GLUCOMTR-MCNC: 108 MG/DL (ref 70–99)
GLUCOSE BLDC GLUCOMTR-MCNC: 157 MG/DL (ref 70–99)
GLUCOSE BLDC GLUCOMTR-MCNC: 194 MG/DL (ref 70–99)
GLUCOSE BLDC GLUCOMTR-MCNC: 199 MG/DL (ref 70–99)
GLUCOSE BLDC GLUCOMTR-MCNC: 206 MG/DL (ref 70–99)
GLUCOSE BLDC GLUCOMTR-MCNC: 79 MG/DL (ref 70–99)
GLUCOSE SERPL-MCNC: 96 MG/DL (ref 70–99)
HCT VFR BLD AUTO: 44.7 % (ref 35–47)
HGB BLD-MCNC: 13.7 G/DL (ref 11.7–15.7)
MAGNESIUM SERPL-MCNC: 2.2 MG/DL (ref 1.7–2.3)
MCH RBC QN AUTO: 25.4 PG (ref 26.5–33)
MCHC RBC AUTO-ENTMCNC: 30.6 G/DL (ref 31.5–36.5)
MCV RBC AUTO: 83 FL (ref 78–100)
PLATELET # BLD AUTO: 239 10E3/UL (ref 150–450)
POTASSIUM SERPL-SCNC: 4.6 MMOL/L (ref 3.4–5.3)
RBC # BLD AUTO: 5.4 10E6/UL (ref 3.8–5.2)
SODIUM SERPL-SCNC: 139 MMOL/L (ref 135–145)
WBC # BLD AUTO: 11.3 10E3/UL (ref 4–11)

## 2024-04-21 PROCEDURE — 80048 BASIC METABOLIC PNL TOTAL CA: CPT | Performed by: STUDENT IN AN ORGANIZED HEALTH CARE EDUCATION/TRAINING PROGRAM

## 2024-04-21 PROCEDURE — 94640 AIRWAY INHALATION TREATMENT: CPT

## 2024-04-21 PROCEDURE — 210N000002 HC R&B HEART CARE

## 2024-04-21 PROCEDURE — 250N000013 HC RX MED GY IP 250 OP 250 PS 637: Performed by: STUDENT IN AN ORGANIZED HEALTH CARE EDUCATION/TRAINING PROGRAM

## 2024-04-21 PROCEDURE — 999N000157 HC STATISTIC RCP TIME EA 10 MIN

## 2024-04-21 PROCEDURE — 250N000012 HC RX MED GY IP 250 OP 636 PS 637: Performed by: INTERNAL MEDICINE

## 2024-04-21 PROCEDURE — 250N000013 HC RX MED GY IP 250 OP 250 PS 637: Performed by: INTERNAL MEDICINE

## 2024-04-21 PROCEDURE — 99207 PR CDG-CUT & PASTE-POTENTIAL IMPACT ON LEVEL: CPT | Performed by: STUDENT IN AN ORGANIZED HEALTH CARE EDUCATION/TRAINING PROGRAM

## 2024-04-21 PROCEDURE — 94640 AIRWAY INHALATION TREATMENT: CPT | Mod: 76

## 2024-04-21 PROCEDURE — 36415 COLL VENOUS BLD VENIPUNCTURE: CPT | Performed by: STUDENT IN AN ORGANIZED HEALTH CARE EDUCATION/TRAINING PROGRAM

## 2024-04-21 PROCEDURE — 83735 ASSAY OF MAGNESIUM: CPT | Performed by: STUDENT IN AN ORGANIZED HEALTH CARE EDUCATION/TRAINING PROGRAM

## 2024-04-21 PROCEDURE — 85027 COMPLETE CBC AUTOMATED: CPT | Performed by: STUDENT IN AN ORGANIZED HEALTH CARE EDUCATION/TRAINING PROGRAM

## 2024-04-21 PROCEDURE — 99233 SBSQ HOSP IP/OBS HIGH 50: CPT | Performed by: STUDENT IN AN ORGANIZED HEALTH CARE EDUCATION/TRAINING PROGRAM

## 2024-04-21 PROCEDURE — 250N000009 HC RX 250: Performed by: INTERNAL MEDICINE

## 2024-04-21 RX ADMIN — PANTOPRAZOLE SODIUM 20 MG: 20 TABLET, DELAYED RELEASE ORAL at 08:43

## 2024-04-21 RX ADMIN — GUAIFENESIN 200 MG: 200 SOLUTION ORAL at 00:33

## 2024-04-21 RX ADMIN — LEVALBUTEROL HYDROCHLORIDE 1.25 MG: 1.25 SOLUTION RESPIRATORY (INHALATION) at 07:20

## 2024-04-21 RX ADMIN — ACETAMINOPHEN 650 MG: 325 TABLET, FILM COATED ORAL at 22:34

## 2024-04-21 RX ADMIN — LEVALBUTEROL HYDROCHLORIDE 1.25 MG: 1.25 SOLUTION RESPIRATORY (INHALATION) at 14:27

## 2024-04-21 RX ADMIN — PREDNISONE 40 MG: 10 TABLET ORAL at 08:42

## 2024-04-21 RX ADMIN — DULOXETINE HYDROCHLORIDE 60 MG: 60 CAPSULE, DELAYED RELEASE ORAL at 08:43

## 2024-04-21 RX ADMIN — BUPROPION HYDROCHLORIDE 100 MG: 100 TABLET, FILM COATED, EXTENDED RELEASE ORAL at 22:35

## 2024-04-21 RX ADMIN — ATORVASTATIN CALCIUM 20 MG: 20 TABLET, FILM COATED ORAL at 22:34

## 2024-04-21 RX ADMIN — APIXABAN 2.5 MG: 2.5 TABLET, FILM COATED ORAL at 08:42

## 2024-04-21 RX ADMIN — LEVALBUTEROL HYDROCHLORIDE 1.25 MG: 1.25 SOLUTION RESPIRATORY (INHALATION) at 11:17

## 2024-04-21 RX ADMIN — METOPROLOL TARTRATE 75 MG: 50 TABLET, FILM COATED ORAL at 08:42

## 2024-04-21 RX ADMIN — GUAIFENESIN 200 MG: 200 SOLUTION ORAL at 08:42

## 2024-04-21 RX ADMIN — Medication 25 MCG: at 08:43

## 2024-04-21 RX ADMIN — METOPROLOL TARTRATE 75 MG: 50 TABLET, FILM COATED ORAL at 22:35

## 2024-04-21 RX ADMIN — GUAIFENESIN 200 MG: 200 SOLUTION ORAL at 22:34

## 2024-04-21 RX ADMIN — TORSEMIDE 40 MG: 20 TABLET ORAL at 08:42

## 2024-04-21 RX ADMIN — LEVALBUTEROL HYDROCHLORIDE 1.25 MG: 1.25 SOLUTION RESPIRATORY (INHALATION) at 01:34

## 2024-04-21 RX ADMIN — FEXOFENADINE HCL 180 MG: 180 TABLET ORAL at 22:35

## 2024-04-21 RX ADMIN — APIXABAN 2.5 MG: 2.5 TABLET, FILM COATED ORAL at 22:34

## 2024-04-21 RX ADMIN — ACETAMINOPHEN 650 MG: 325 TABLET, FILM COATED ORAL at 00:33

## 2024-04-21 RX ADMIN — LEVALBUTEROL HYDROCHLORIDE 1.25 MG: 1.25 SOLUTION RESPIRATORY (INHALATION) at 21:07

## 2024-04-21 ASSESSMENT — ACTIVITIES OF DAILY LIVING (ADL)
ADLS_ACUITY_SCORE: 37
ADLS_ACUITY_SCORE: 41
ADLS_ACUITY_SCORE: 37
ADLS_ACUITY_SCORE: 41
ADLS_ACUITY_SCORE: 37
ADLS_ACUITY_SCORE: 41
ADLS_ACUITY_SCORE: 37

## 2024-04-21 NOTE — PROGRESS NOTES
"Human metapneumovirus requires contact and droplet precautions for \"duration of illness.\"  This can be subjective and determined by the care team based on resolution of fever (if ever present) and improved symptoms.  I have set the infection flag to fall of after Sunday and then the care team can remove the droplet and contact whenever is appropriate after that.  So isolation will not be a reason to hold up discharge.  Danika Deleon, Infection Prevention    "

## 2024-04-21 NOTE — PLAN OF CARE
Neuro: AO X4  CV/Rhythm: Afib  Resp/02: RA at rest  GI/Diet: Mod consistent carb diet  : Incontinence  Skin/Incisions/Sites: Intact  Pulses/CMS: WNL  Edema: BLE +1  Activity/Falls Risk: Standby assist x1 with a walker and gait belt  Lines/Drains/IVs: LH  VS/Pain: Denies pain  DC Plan: Pres Home 4/22/24 but refusing

## 2024-04-21 NOTE — PLAN OF CARE
[id number]  Neuro- A/Ox4, forgetful  Most Recent Vitals- Temp: 97.1  F (36.2  C) Temp src: Axillary BP: 131/87 Pulse: 58   Resp: 18 SpO2: 94 % O2 Device: None (Room air)   Tele/Cardiac- Afib CVR  Resp- on RA, LS diminished w/ crackles in bases  Activity- 1A GB/W  Pain- PRN Tylenol given for backache  Drips- n/a  Drains/Tubes- PIV  Skin- trace BLE edema  GI/- WDL  Plan- plan of care ongoing, likely discharge to TCU tomorrow    Aura Breen RN

## 2024-04-21 NOTE — PLAN OF CARE
Goal Outcome Evaluation:   19-23: A&O x4, VSS ex SBP 140s. Noted dyspnea with activities, reported better today. Denies SOB at rest. Encouraged pulmonary hygiene. Up with 1A/GB/W. Denies pain. Plans for possible discharge tomorrow.

## 2024-04-21 NOTE — PROGRESS NOTES
"[facility]    Hospitalist Progress Note    Assessment & Plan     Date of Admission:  4/14/2024  Date of Service: 04/21/2024     Assessment & Plan    Moira Andre is a 89 year old female admitted on 4/14/2024. She has h/o HFpEF, Afib (eliquis), CAD (Stents 2007), DMII (glimepiride), ELIGIO, [place], Ventral hernia, COPD/Asthma,      She presents with complaints of escalating wheezing/HENDERSON and cough .   Past 3 days or so.   SHe came from her apartment where she lives independently with her cat.   EMS noted O2sats in 80's, gave her duenebs X 2 on way to ER.  In ER, , started diltiazem gtt.   BP stable.        She presented similarly in 12/2023 -- but this on this presentation,  she also has a cough and wheezing.   Denies med non compliance.  No chest pain.  No increasing edema.  No GI complaints.          Afib RVR   (During 11/2023 admission, metoprolol increased to 75 bid)- resolved   Chronic atrial fibrillation   Anticoagulated  HFpEF, decompensated  BNP 4000   Lactic Acidosis   2.7 > 2.0 ...  Elevated troponin (35 -  this is \"baseline\" per review of chart)--> 42, 33  Aortic Stenosis, moderate    Mitral Valve Stenosis, mild, with severe calcification    CAD (OM1 and diagonal stents 2007, angiograms 2013, 2017 showed patency)  -initiated on dilt gtt and iv/po metoprolol   -cr up with iv lasix X 2 on admission   -K and Mg at goal  - HR controlled on dilt gtt, stopped am 4/15  -lactic acid normalized  -flat troponin trend  -driven/ppt'd by copd exacerbation likely  -cardiology consulted and signed off and no further recs. Continue with PTA metoprolol. They reviewed echo and no concerning findings needing follow.   -Echo  -technically extremely difficult study as previously reported. Last  echocardiogram was 3-1/2 months ago.  -Normal LV size and systolic function.   -Study is inadequate to accurately assess wall motion.  -possible mild to moderate hypokinesis of the mid anteroseptum on " contrasted images.  - right ventricle is not well-seen. Probably borderline dilated. RV systolic  function is mildly/mild to moderately reduced.  -Aortic valve is difficult to visualize. probably mild to moderate  aortic stenosis with a mean gradient of 14 to 18 mmHg. Valve area is likely  underestimated due to reduce stroke-volume index.  - severe thickening of the mitral valve and mitral calcification noted.  Mean inflow gradient across the mitral valve ranging from 6 to 9 mmHg  consistent with moderate mitral stenosis.  Doppler suggest left to right interatrial shunt.  IVC is normal without pericardial effusion.    -remains in rate controlled afib.     -4/16. Worsening TIFFANI, new AGMA, fluid trial but no improvement in bmp. elevated lactate, increase in BNP  -Fena <1%, increase in Bilateral crackles. Possible TIFFANI prerenal 2/2 to CHF.  repeat CXR 2V showing worsening pulmonary edema.  4/19;  -have continued to treat for component of CHF since pm 4/16, no clear improvement in Cr with iv lasix ~ bid. I/O not accurate given incontinence but suspect good response to IV lasix. Over the last 48 hours she has clearly felt less sob while on iv lasix, decrease in crackles but do persist, weaned off oxygen and clinically doing better overall. Wheezing nearly resolved. May be copd exacerbation with possible cardiac wheezing  Difficult volume exam. Bilateral crackles chf vs pneumonia vs both   Echo reveals IVC does not suggest volume overload.   Plan:  -Limited echo today to check IVC, if flat then resume PtA po lasix in am. If suggestive of volume up then will resume IV lasix every day or restart PTA diuretic   - follow for need of cardiology reconsult (discussed with cardiology on 4/18)  - Continue pta metoprolol, eliquis, atorvastatin  - strict I&O   -cardiology follow up outpatient   - treat copd exacerbation  - I suspect she is euvolemic at this time, still with crackles but that may he her viral +/- viral pneumonia  -  "Will hold diuretic today as pt doing well. Bun/cr are up and may be intravascularly dry. Crackles on exam may be at this point 2/2 viral pneumonia  - Re-valuate bmp in am. -> Cr is improving  - Restart diuretic  - TCU tomorrow likely   - Reassess need for isolation before discharge. -> infection flag to fall of after Sunday and then can remove the droplet and contact whenever      Acute Respiratory Failure with Hypoxia   Viral pneumonia: Human Metapneumovirus  Possible superimposed bacterial pneumonia   COPD/Asthma  Wheezing  Cough     Bilateral Infultrates: acute CHF vs pneumonia vs both   - Covid/Influenza/RSV screening (-)  -dry cough, productive initially, 4-5 days PTA. Some crackles bibasilar. Chf, possible pneumonia  - 4/14/24 portable CXR is poor quality, likely shows CHF.     - procalcitonin 0.12  - due to new cough and hypoxia, empirically started Abx (ceftriaxone/azithromycin) for possible CAP on admission contributing  - scheduled duonebs -- re assess daily and change to prn when appropriate.   - initiated on solumedrol IV     - of note, she was discharged 12/2023 with supplemental O2.  She states it \"burned her nose\" so she never really used it.  The tank was too heavy to be used with ambulation  Called oxygen company to cancel \"They never picked it up, still there\".     - Briefly needed bipap night of admission  Plan:  - pt has improved daily with steroids, abx and neb Rx, dry cough, wheezing, sob and crackles all improved   - weaned off oxygen.   - working with PT, up walking in [place]  - steroid dose tapered.   -Wbc up but suspect from steroids. Now trending down  - dry cough and wheezin  -Viral swab: Human Metapneumovirus-- supportive care, isolation  -treated for suspected coexisting CHF   -weaned off oxygen  Plan-  - IS, mobilize   - Xopenex q4 hours for now  - Change solumedrol to prednisone 40mg every day, begin taper in 1-2 days  - CPAP at bedtime      CKD3b  AGMA- resolved   Creatinine is 1.63 - " at her baseline.   -CR 1.8 am 4/15 after lasix 40mg IV X 2 day of admission  -on Torsemide 20mg bid PTA  - cr up to 2.0 with rise in bun   -+ AGMA  -UA grossly abnormal. ? ATN  - ucx negative   -nl PVRs  -not improve with fluid trial 4/16  -initiated iv lasix concern for acute CHF, cardiorenal  -IV lasix since pm 4/16. Good UO given multiple saturated undergarments, I/O not accurate given incontenence  -weaned off oxygen. Suspect near euvolemia   -cr stable at 2.0 with iv lasix diuresis   Plan:  - Daily bmp  - Avoid NSAIDs / nephrotoxins     Nephrolithiasis  - per 11/2023 CT, she has 1 cm stone in R renal pelvis, nonobstructive  - no  complaints  - check UA if  complaints arise.  -  No elective intervention planned (decided per her PCP and urology), due to multiple co morbidities.      -UA abnl for TIFFANI work up 4/16 but not flank pain           DMII  A1C = 7.7 2/22/2024  - holding pta glimepiride  -started on lantus, prandial aspart and ISS, dose adjustment   -BS down 100 range with increase in prandial aspart  -titrated up prandial aspart during hospital stay   Plan-  -hold glimepiride in hospital  -follow BS qid  -prandial aspart increased to 1 unit per 4 gram carbs--> will decrease to 1 unit per 6 gram carb as low BS am of 4/19 (decreased steroids on 4/18)  -lantus 5 units qAM, adjust as needed.   - high intensity ISS   -dm diet  - pcp follow up   -stopped nph as stopping pm steroids        Large ventral hernia  - affects her mobility and accessory muscle use  - not surgical candidate  -NT, no obstructive sxs during stay    Bilateral Knee replacements   - affects mobility   -uses walker at home   -PT eval     ELIGIO  Her cpap machine is broken, has appt to fix  Wearing cpap every night in hosital      PT/OT/CC consults to help with dispo planning.       Tinea-  Breast and groin folds  - miconazole cream to rash bid.               Diet: dm diet  DVT Prophylaxis: DOAC  Charles Catheter: Not present  Lines: None    "  Cardiac Monitoring: ACTIVE order. Indication: Tachyarrhythmias, acute (48 hours)  Code Status: Full Code          Clinically Significant Risk Factors Present on Admission[]Expand by Default               # Drug Induced Coagulation Defect: home medication list includes an anticoagulant medication    # Hypertension: Noted on problem list  # Acute heart failure with preserved ejection fraction: heart failure noted on problem list, last echo with EF >50%, and receiving IV diuretics    # DMII: A1C = 7.7 % (Ref range: 0.0 - 5.6 %) within past 6 months    # Severe Obesity: Estimated body mass index is 41.5 kg/m  as calculated from the following:    Height as of this encounter: 1.702 m (5' 7\").    Weight as of this encounter: 120.2 kg (265 lb).       # Financial/Environmental Concerns:    # Asthma: noted on problem list               Disposition Plan     Medically Ready for Discharge: Anticipated in 1-2  Days, updated SW  PTOT, SW eval.  Needs tcu, uses walker at home.   Pcp follow up     Pt agrees to tcu.      Moderate complexity, >35 mins on care coord with rn,YANIQUE, chart review, charting., exam , , counseling patient    Damien Dunbar MD  Text Page  (7am to 6pm)  Interval History     No acute events overnight  Feeling better daily. No wheezing.   SOB continues to improve and remains off oxygen  No fevers  No CP  No nausea / vomiting  Working on TCU placement    -Data reviewed today: I reviewed all new labs and imaging results over the last 24 hours. I personally reviewed labs and imaging since admission    Physical Exam   Temp: 97.5  F (36.4  C) Temp src: Oral BP: (!) 151/98 Pulse: 79   Resp: 20 SpO2: 95 % O2 Device: None (Room air)    Vitals:    04/19/24 0607 04/20/24 0429 04/21/24 0639   Weight: 115 kg (253 lb 8.5 oz) 113.6 kg (250 lb 8 oz) 113.9 kg (251 lb)     Vital Signs with Ranges  Temp:  [97.1  F (36.2  C)-97.9  F (36.6  C)] 97.5  F (36.4  C)  Pulse:  [58-82] 79  Resp:  [18-20] 20  BP: (131-151)/(87-98) " 151/98  SpO2:  [94 %-95 %] 95 %  I/O last 3 completed shifts:  In: 450 [P.O.:450]  Out: -     Constitutional: Up in chair, nad  Neck: thick. Jvp difficult to assess -not seem elevated today  Respiratory:  Bibasilar crackles- but overall. Breathing easily   Cardiovascular: irreg irreg rhythm. 2/6 systolic murmur RUSB, not tachy  GI: ventral hernia. NT, nd  Skin/Integumen: rash bilateral groin and breast skin folds  Neuro: nl speech and mentation  Psych: nl affect    Medications   Current Facility-Administered Medications   Medication Dose Route Frequency Provider Last Rate Last Admin    No anticoagulants IF patient has had acute trauma/surgery or recent intracranial, GI or urinary tract bleeding.    Other DOES NOT GO TO Hortencia Painting MD        Patient is already receiving anticoagulation with heparin, enoxaparin (LOVENOX), warfarin (COUMADIN)  or other anticoagulant medication   Does not apply Continuous PRN Hortencia Ohara MD         Current Facility-Administered Medications   Medication Dose Route Frequency Provider Last Rate Last Admin    apixaban ANTICOAGULANT (ELIQUIS) tablet 2.5 mg  2.5 mg Oral BID Hortencia Ohara MD   2.5 mg at 04/21/24 0842    atorvastatin (LIPITOR) tablet 20 mg  20 mg Oral QPM Hortencia Ohara MD   20 mg at 04/20/24 2057    buPROPion (WELLBUTRIN SR) 12 hr tablet 100 mg  100 mg Oral QPM Hortencia Ohara MD   100 mg at 04/20/24 2057    DULoxetine (CYMBALTA) DR capsule 60 mg  60 mg Oral Daily Hortencia Ohara MD   60 mg at 04/21/24 0843    fexofenadine (ALLEGRA) tablet 180 mg  180 mg Oral QPM Hortencia Ohara MD   180 mg at 04/20/24 2057    insulin aspart (NovoLOG) injection (RAPID ACTING)   Subcutaneous TID w/meals Tommy Hernandez MD   10 Units at 04/21/24 0845    insulin aspart (NovoLOG) injection (RAPID ACTING)  1-10 Units Subcutaneous TID AC Tommy Hernandez MD   5 Units at 04/20/24 1813    insulin aspart (NovoLOG) injection (RAPID ACTING)  1-7  Units Subcutaneous At Bedtime Tommy Hernandez MD   2 Units at 04/20/24 2158    insulin glargine (LANTUS PEN) injection 5 Units  5 Units Subcutaneous QAM AC Tommy Hernandez MD   5 Units at 04/21/24 0846    levalbuterol (XOPENEX) neb solution 1.25 mg  1.25 mg Nebulization Q4H Tommy Hernandez MD   1.25 mg at 04/21/24 1117    metoprolol tartrate (LOPRESSOR) tablet 75 mg  75 mg Oral BID Hortencia Ohara MD   75 mg at 04/21/24 0842    miconazole (MICATIN) 2 % cream   Topical BID Tommy Hernandez MD   Given at 04/20/24 2059    pantoprazole (PROTONIX) EC tablet 20 mg  20 mg Oral Daily Hortencia Ohara MD   20 mg at 04/21/24 0843    predniSONE (DELTASONE) tablet 40 mg  40 mg Oral Daily Tommy Hernandez MD   40 mg at 04/21/24 0842    sodium chloride (PF) 0.9% PF flush 3 mL  3 mL Intracatheter Q8H Hortencia Ohara MD   3 mL at 04/21/24 0844    torsemide (DEMADEX) tablet 40 mg  40 mg Oral Daily Damien Dunbar MD   40 mg at 04/21/24 0842    Vitamin D3 (CHOLECALCIFEROL) tablet 25 mcg  25 mcg Oral Daily Hortencia Ohara MD   25 mcg at 04/21/24 0843       Data   Recent Labs   Lab 04/21/24  1130 04/21/24  0749 04/21/24  0600 04/20/24  0836 04/20/24  0601 04/19/24  0843 04/19/24  0617   WBC  --   --  11.3*  --  15.8*  --  14.6*   HGB  --   --  13.7  --  14.7  --  14.0   MCV  --   --  83  --  82  --  83   PLT  --   --  239  --  360  --  340   NA  --   --  139  --  141  --  139   POTASSIUM  --   --  4.6  --  4.2  --  3.8   CHLORIDE  --   --  103  --  100  --  98   CO2  --   --  19*  --  29  --  29   BUN  --   --  54.4*  --  59.2*  --  73.7*   CR  --   --  1.50*  --  1.64*  --  2.02*   ANIONGAP  --   --  17*  --  12  --  12   TELLY  --   --  9.2  --  9.3  --  8.9   * 79 96   < > 105*   < > 100*    < > = values in this interval not displayed.       Imaging:   No results found for this or any previous visit (from the past 24 hour(s)).

## 2024-04-22 ENCOUNTER — APPOINTMENT (OUTPATIENT)
Dept: OCCUPATIONAL THERAPY | Facility: CLINIC | Age: 89
DRG: 291 | End: 2024-04-22
Payer: COMMERCIAL

## 2024-04-22 ENCOUNTER — APPOINTMENT (OUTPATIENT)
Dept: PHYSICAL THERAPY | Facility: CLINIC | Age: 89
DRG: 291 | End: 2024-04-22
Payer: COMMERCIAL

## 2024-04-22 VITALS
HEIGHT: 67 IN | DIASTOLIC BLOOD PRESSURE: 94 MMHG | HEART RATE: 60 BPM | OXYGEN SATURATION: 97 % | TEMPERATURE: 97.7 F | WEIGHT: 248.6 LBS | SYSTOLIC BLOOD PRESSURE: 159 MMHG | RESPIRATION RATE: 18 BRPM | BODY MASS INDEX: 39.02 KG/M2

## 2024-04-22 LAB
GLUCOSE BLDC GLUCOMTR-MCNC: 164 MG/DL (ref 70–99)
GLUCOSE BLDC GLUCOMTR-MCNC: 176 MG/DL (ref 70–99)
GLUCOSE BLDC GLUCOMTR-MCNC: 88 MG/DL (ref 70–99)
MAGNESIUM SERPL-MCNC: 2 MG/DL (ref 1.7–2.3)
POTASSIUM SERPL-SCNC: 4.3 MMOL/L (ref 3.4–5.3)

## 2024-04-22 PROCEDURE — 250N000013 HC RX MED GY IP 250 OP 250 PS 637: Performed by: STUDENT IN AN ORGANIZED HEALTH CARE EDUCATION/TRAINING PROGRAM

## 2024-04-22 PROCEDURE — 94640 AIRWAY INHALATION TREATMENT: CPT

## 2024-04-22 PROCEDURE — 999N000157 HC STATISTIC RCP TIME EA 10 MIN

## 2024-04-22 PROCEDURE — 97535 SELF CARE MNGMENT TRAINING: CPT | Mod: GO | Performed by: OCCUPATIONAL THERAPIST

## 2024-04-22 PROCEDURE — 97530 THERAPEUTIC ACTIVITIES: CPT | Mod: GP

## 2024-04-22 PROCEDURE — 250N000013 HC RX MED GY IP 250 OP 250 PS 637: Performed by: INTERNAL MEDICINE

## 2024-04-22 PROCEDURE — 36415 COLL VENOUS BLD VENIPUNCTURE: CPT | Performed by: STUDENT IN AN ORGANIZED HEALTH CARE EDUCATION/TRAINING PROGRAM

## 2024-04-22 PROCEDURE — 83735 ASSAY OF MAGNESIUM: CPT | Performed by: STUDENT IN AN ORGANIZED HEALTH CARE EDUCATION/TRAINING PROGRAM

## 2024-04-22 PROCEDURE — 84132 ASSAY OF SERUM POTASSIUM: CPT | Performed by: STUDENT IN AN ORGANIZED HEALTH CARE EDUCATION/TRAINING PROGRAM

## 2024-04-22 PROCEDURE — 250N000009 HC RX 250: Performed by: INTERNAL MEDICINE

## 2024-04-22 PROCEDURE — 250N000012 HC RX MED GY IP 250 OP 636 PS 637: Performed by: INTERNAL MEDICINE

## 2024-04-22 PROCEDURE — 99239 HOSP IP/OBS DSCHRG MGMT >30: CPT | Performed by: STUDENT IN AN ORGANIZED HEALTH CARE EDUCATION/TRAINING PROGRAM

## 2024-04-22 PROCEDURE — 94640 AIRWAY INHALATION TREATMENT: CPT | Mod: 76

## 2024-04-22 RX ORDER — MICONAZOLE NITRATE 20 MG/G
CREAM TOPICAL 2 TIMES DAILY
Qty: 14 G | Refills: 0 | Status: SHIPPED | OUTPATIENT
Start: 2024-04-22 | End: 2024-04-27

## 2024-04-22 RX ORDER — PREDNISONE 10 MG/1
TABLET ORAL
Qty: 20 TABLET | Refills: 0 | Status: SHIPPED | OUTPATIENT
Start: 2024-04-22 | End: 2024-04-29

## 2024-04-22 RX ADMIN — APIXABAN 2.5 MG: 2.5 TABLET, FILM COATED ORAL at 08:18

## 2024-04-22 RX ADMIN — DULOXETINE HYDROCHLORIDE 60 MG: 60 CAPSULE, DELAYED RELEASE ORAL at 08:18

## 2024-04-22 RX ADMIN — MICONAZOLE NITRATE: 20 CREAM TOPICAL at 08:22

## 2024-04-22 RX ADMIN — TORSEMIDE 40 MG: 20 TABLET ORAL at 08:18

## 2024-04-22 RX ADMIN — Medication 25 MCG: at 08:18

## 2024-04-22 RX ADMIN — METOPROLOL TARTRATE 75 MG: 50 TABLET, FILM COATED ORAL at 08:18

## 2024-04-22 RX ADMIN — PREDNISONE 40 MG: 10 TABLET ORAL at 08:18

## 2024-04-22 RX ADMIN — PANTOPRAZOLE SODIUM 20 MG: 20 TABLET, DELAYED RELEASE ORAL at 08:18

## 2024-04-22 RX ADMIN — LEVALBUTEROL HYDROCHLORIDE 1.25 MG: 1.25 SOLUTION RESPIRATORY (INHALATION) at 03:55

## 2024-04-22 RX ADMIN — LEVALBUTEROL HYDROCHLORIDE 1.25 MG: 1.25 SOLUTION RESPIRATORY (INHALATION) at 07:39

## 2024-04-22 RX ADMIN — LEVALBUTEROL HYDROCHLORIDE 1.25 MG: 1.25 SOLUTION RESPIRATORY (INHALATION) at 11:26

## 2024-04-22 ASSESSMENT — ACTIVITIES OF DAILY LIVING (ADL)
ADLS_ACUITY_SCORE: 41
ADLS_ACUITY_SCORE: 43
ADLS_ACUITY_SCORE: 41
ADLS_ACUITY_SCORE: 43
ADLS_ACUITY_SCORE: 41
ADLS_ACUITY_SCORE: 43

## 2024-04-22 NOTE — PROGRESS NOTES
Care Management Follow Up    Length of Stay (days): 8    Expected Discharge Date: 04/23/2024     Concerns to be Addressed:       Patient plan of care discussed at interdisciplinary rounds: Yes    Anticipated Discharge Disposition:  (TCU)     Anticipated Discharge Services:    Anticipated Discharge DME:      Patient/family educated on Medicare website which has current facility and service quality ratings:    Education Provided on the Discharge Plan:    Patient/Family in Agreement with the Plan: yes    Referrals Placed by CM/SW:    Private pay costs discussed: Not applicable    Additional Information:  Writer placed phone call to [facility] TCU 858am. No answer. Writer left voicemail asking for call back about ability to accommodate pt.  Writer spoke with  who states pt stable for discharge.  [facility]. Writer left voicemail asking for call back.     Addendum: Writer placed message via [employer] referral to [facility] informing them that writer has left multiple voicemails and would like a call back as plan had previously been discharge today. Writer requesting call back.     Writer spoke with Jeanette from [facility]. At this they time are not able to take pt until tomorrow. Plan for discharge per their request 3-4pm tomorrow.    Writer spoke with bedside RN who states that pt can go wheelchair transport tomorrow. She states pt is not on oxygen and pt is alert and oriented.     Writer spoke with Harriet at [facility] transport and ride set up for [phone].     Writer then received a phone call from  who states he spent over 30 minutes talking to pt about plan for discharge tomorrow to TCU and pt informed him that she is adamant that she will go home and not to a TCU.  is asking for home care to be set up. Writer informed him that writer is unsure if care coordiantor will be able to secure a home care in time for a discharge today but  "writer will let care coordinator know. Doctor Bettina wanted to be updated. Doctor states he plans on discharging pt today home with home care as pt refusing TCU.     Writer informed care coordinator of doctor planning to discharge pt home with home care instead of TCU as pt refusing this. Writer informed care coordinator that doctor wanting her to obtain home care. Care coordinator planning to talk to pt.     Addendum\" Writer cancelled transport. Writer called [facility]. No answer. Writer left voicemail updating of pt planning to discharge home now and can cancel discharge plan for tomorrow.   Care coordinator states she met with pt and set up home care.     Debby Glez, WANDYW  Social Work  [facility]    "

## 2024-04-22 NOTE — PLAN OF CARE
Neuro- A/Ox4  Most Recent Vitals- Temp: 97.5  F (36.4  C) Temp src: Oral BP: (!) 147/99 Pulse: 80   Resp: 18 SpO2: 96 % O2 Device: None (Room air)   Tele/Cardiac- Afib SVR/CVR w/ PVCs  Resp- on RA, LS diminished; HENDERSON, nonproductive cough  Activity- 1A GB/W  Pain- denies  Drips- n/a  Drains/Tubes- PIV  Skin- trace BLE edema, rash under breasts and THIAGO perineum  GI/- urinary incontinence, adequate UOP  Plan- likely discharge today, possible TCU vs. home as patient was refusing TCU as of 4/21    Aura Breen RN

## 2024-04-22 NOTE — DISCHARGE SUMMARY
"[facility]  Hospitalist Discharge Summary      Date of Admission:  4/14/2024  Date of Discharge:  4/22/2024  Discharging Provider: Damien Dunbar MD  Discharge Service: Hospitalist Service    Discharge Diagnoses     Chronic atrial fibrillation   Anticoagulated  HFpEF, decompensated  BNP 4000   Lactic Acidosis   2.7 > 2.0 ...  Elevated troponin (35 -  this is \"baseline\" per review of chart)--> 42, 33  Aortic Stenosis, moderate    Mitral Valve Stenosis, mild, with severe calcification      Clinically Significant Risk Factors     # DMII: A1C = 7.7 % (Ref range: 0.0 - 5.6 %) within past 6 months    # Obesity: Estimated body mass index is 38.94 kg/m  as calculated from the following:    Height as of this encounter: 1.702 m (5' 7\").    Weight as of this encounter: 112.8 kg (248 lb 9.6 oz).       Follow-ups Needed After Discharge   Follow-up Appointments     Follow-up and recommended labs and tests       Follow up with primary care provider, Maryan Churchill, within 7 days for   hospital follow- up.  The following labs/tests are recommended: BMP.            Unresulted Labs Ordered in the Past 30 Days of this Admission       No orders found from 3/15/2024 to 4/15/2024.          Discharge Disposition   Discharged to home  Condition at discharge: Stable    Hospital Course   Date of Admission:  4/14/2024  Date of Service: 04/21/2024     Assessment & Plan    Moira Andre is a 89 year old female admitted on 4/14/2024. She has h/o HFpEF, Afib (eliquis), CAD (Stents 2007), DMII (glimepiride), ELIGIO, [place], Ventral hernia, COPD/Asthma,      She presents with complaints of escalating wheezing/[place] and cough .   Past 3 days or so.   SHe came from her apartment where she lives independently with her cat.   EMS noted O2sats in 80's, gave her duenebs X 2 on way to ER.  In ER, , started diltiazem gtt.   BP stable.        She presented similarly in 12/2023 -- but this on this presentation,  she also has a cough " "and wheezing.   Denies med non compliance.  No chest pain.  No increasing edema.  No GI complaints.          Afib RVR   (During 11/2023 admission, metoprolol increased to 75 bid)- resolved   Chronic atrial fibrillation   Anticoagulated  HFpEF, decompensated  BNP 4000   Lactic Acidosis   2.7 > 2.0 ...  Elevated troponin (35 -  this is \"baseline\" per review of chart)--> 42, 33  Aortic Stenosis, moderate    Mitral Valve Stenosis, mild, with severe calcification    CAD (OM1 and diagonal stents 2007, angiograms 2013, 2017 showed patency)  -initiated on dilt gtt and iv/po metoprolol   -cr up with iv lasix X 2 on admission   -K and Mg at goal  - HR controlled on dilt gtt, stopped am 4/15  -lactic acid normalized  -flat troponin trend  -driven/ppt'd by copd exacerbation likely  -cardiology consulted and signed off and no further recs. Continue with PTA metoprolol. They reviewed echo and no concerning findings needing follow.   -Echo  -technically extremely difficult study as previously reported. Last  echocardiogram was 3-1/2 months ago.  -Normal LV size and systolic function.   -Study is inadequate to accurately assess wall motion.  -possible mild to moderate hypokinesis of the mid anteroseptum on contrasted images.  - right ventricle is not well-seen. Probably borderline dilated. RV systolic  function is mildly/mild to moderately reduced.  -Aortic valve is difficult to visualize. probably mild to moderate  aortic stenosis with a mean gradient of 14 to 18 mmHg. Valve area is likely  underestimated due to reduce stroke-volume index.  - severe thickening of the mitral valve and mitral calcification noted.  Mean inflow gradient across the mitral valve ranging from 6 to 9 mmHg  consistent with moderate mitral stenosis.  Doppler suggest left to right interatrial shunt.  IVC is normal without pericardial effusion.    -remains in rate controlled afib.     -4/16. Worsening TIFFANI, new AGMA, fluid trial but no improvement in bmp. " elevated lactate, increase in BNP  -Fena <1%, increase in Bilateral crackles. Possible TIFFANI prerenal 2/2 to CHF.  repeat CXR 2V showing worsening pulmonary edema.  4/19;  -have continued to treat for component of CHF since pm 4/16, no clear improvement in Cr with iv lasix ~ bid. I/O not accurate given incontinence but suspect good response to IV lasix. Over the last 48 hours she has clearly felt less sob while on iv lasix, decrease in crackles but do persist, weaned off oxygen and clinically doing better overall. Wheezing nearly resolved. May be copd exacerbation with possible cardiac wheezing  Difficult volume exam. Bilateral crackles chf vs pneumonia vs both   Echo reveals IVC does not suggest volume overload.   Plan:  -Limited echo today to check IVC, if flat then resume PtA po lasix in am. If suggestive of volume up then will resume IV lasix every day or restart PTA diuretic   - follow for need of cardiology reconsult (discussed with cardiology on 4/18)  - Continue pta metoprolol, eliquis, atorvastatin  - strict I&O   -cardiology follow up outpatient   - treat copd exacerbation  - I suspect she is euvolemic at this time, still with crackles but that may he her viral +/- viral pneumonia  - Will hold diuretic today as pt doing well. Bun/cr are up and may be intravascularly dry. Crackles on exam may be at this point 2/2 viral pneumonia  - Re-valuate bmp in am. -> Cr is improving  - Restarted PTA diuretic  - TCU refused by patient     Acute Respiratory Failure with Hypoxia   Viral pneumonia: Human Metapneumovirus  Possible superimposed bacterial pneumonia   COPD/Asthma  Wheezing  Cough     Bilateral Infultrates: acute CHF vs pneumonia vs both   - Covid/Influenza/RSV screening (-)  -dry cough, productive initially, 4-5 days PTA. Some crackles bibasilar. Chf, possible pneumonia  - 4/14/24 portable CXR is poor quality, likely shows CHF.     - procalcitonin 0.12  - due to new cough and hypoxia, empirically started Abx  "(ceftriaxone/azithromycin) for possible CAP on admission contributing  - scheduled duonebs -- re assess daily and change to prn when appropriate.   - initiated on solumedrol IV     - of note, she was discharged 12/2023 with supplemental O2.  She states it \"burned her nose\" so she never really used it.  The tank was too heavy to be used with ambulation  Called oxygen company to cancel \"They never picked it up, still there\".     - Briefly needed bipap night of admission  Plan:  - pt has improved daily with steroids, abx and neb Rx, dry cough, wheezing, sob and crackles all improved   - weaned off oxygen.   - working with PT, up walking in [place]  - steroid dose tapered.   -Wbc up but suspect from steroids. Now trending down  - dry cough and wheezin  -Viral swab: Human Metapneumovirus-- supportive care, isolation  -treated for suspected coexisting CHF   -weaned off oxygen  Plan-  - IS, mobilize   - Prednisone taper discharge  - CPAP at bedtime      CKD3b  AGMA- resolved   Creatinine is 1.63 - at her baseline.   -CR 1.8 am 4/15 after lasix 40mg IV X 2 day of admission  -on Torsemide 20mg bid PTA  - cr up to 2.0 with rise in bun   -+ AGMA  -UA grossly abnormal. ? ATN  - ucx negative   -nl PVRs  -not improve with fluid trial 4/16  -initiated iv lasix concern for acute CHF, cardiorenal  -IV lasix since pm 4/16. Good UO given multiple saturated undergarments, I/O not accurate given incontenence  -weaned off oxygen. Suspect near euvolemia   -cr stable at 2.0 with iv lasix diuresis   Plan:  -Ct stable at discharge     Nephrolithiasis  - per 11/2023 CT, she has 1 cm stone in R renal pelvis, nonobstructive  - no  complaints  -  No elective intervention planned (decided per her PCP and urology), due to multiple co morbidities.      -UA abnl for TIFFANI work up 4/16 but not flank pain      DMII  A1C = 7.7 2/22/2024  - holding pta glimepiride  -started on lantus, prandial aspart and ISS, dose adjustment   -BS down 100 range with " "increase in prandial aspart  -titrated up prandial aspart during hospital stay   Plan-  -hold glimepiride in hospital  -follow BS qid  -prandial aspart increased to 1 unit per 4 gram carbs--> will decrease to 1 unit per 6 gram carb as low BS am of 4/19 (decreased steroids on 4/18)  -lantus 5 units qAM, adjust as needed.   - high intensity ISS   -dm diet  - pcp follow up   -stopped nph as stopping pm steroids        Large ventral hernia  - affects her mobility and accessory muscle use  - not surgical candidate  -NT, no obstructive sxs during stay    Bilateral Knee replacements   - affects mobility   -uses walker at home   -PT eval -> refused TCU     ELIGIO  Her cpap machine is broken, has appt to fix  Wearing cpap every night in hosital      PT/OT/CC consults to help with dispo planning.       Tinea-  Breast and groin folds  - miconazole cream to rash bid.         Diet: dm diet  DVT Prophylaxis: DOAC  Charles Catheter: Not present  Lines: None     Cardiac Monitoring: ACTIVE order. Indication: Tachyarrhythmias, acute (48 hours)  Code Status: Full Code          Clinically Significant Risk Factors Present on Admission[]Expand by Default               # Drug Induced Coagulation Defect: home medication list includes an anticoagulant medication    # Hypertension: Noted on problem list  # Acute heart failure with preserved ejection fraction: heart failure noted on problem list, last echo with EF >50%, and receiving IV diuretics    # DMII: A1C = 7.7 % (Ref range: 0.0 - 5.6 %) within past 6 months    # Severe Obesity: Estimated body mass index is 41.5 kg/m  as calculated from the following:    Height as of this encounter: 1.702 m (5' 7\").    Weight as of this encounter: 120.2 kg (265 lb).       # Financial/Environmental Concerns:    # Asthma: noted on problem list               Disposition Plan     Medically Ready for Discharge: Anticipated in 1-2  Days, updated SW  PTOT, SW eval.  Needs tcu, uses walker at home.   Pcp follow up "     Pt agrees to tcu.      Moderate complexity, >35 mins on care coord with rn,SW, chart review, charting., exam , , counseling patient    Damien Dunbar MD  Text Page  (7am to 6pm)    Consultations This Hospital Stay   CARDIOLOGY IP CONSULT  PHYSICAL THERAPY ADULT IP CONSULT  OCCUPATIONAL THERAPY ADULT IP CONSULT  CARE MANAGEMENT / SOCIAL WORK IP CONSULT  PHYSICAL THERAPY ADULT IP CONSULT  OCCUPATIONAL THERAPY ADULT IP CONSULT  INFECTION PREVENTION IP CONSULT    Code Status   Full Code    Time Spent on this Encounter   I, Damien Dunbar MD, personally saw the patient today and spent greater than 30 minutes discharging this patient.       Damien Dunbar MD  [facility]  [address] [place] [address], [address]  [facility] [address]  Phone: [phone]  ______________________________________________________________________    Physical Exam   Vital Signs: Temp: 97.7  F (36.5  C) Temp src: Oral BP: (!) 159/94 Pulse: 60   Resp: 18 SpO2: 97 % O2 Device: None (Room air)    Weight: 248 lbs 9.6 oz  ----------------------------------------------------------------------------------------       Primary Care Physician   Maryan Churchill    Discharge Orders      Follow-Up with Cardiology AMY      Follow-Up with Cardiology AMY      Home Care Referral      Reason for your hospital stay    - viral pneumonia- Human Metapneumovirus  -possible superimposed bacterial pneumonia  -COPD exacerbation  - Acute CHF  -TIFFANI  -DM-2 with hyperglycemia on steroids   -Deconditioned     CPAP - Continue Home CPAP    Continue Home CPAP per home equipment settings.     Reason for your hospital stay    You had shortness of breath from congestive heart failure causing shortness of breath.     Follow-up and recommended labs and tests     Follow up with primary care provider, Maryan Churchill, within 7 days for hospital follow- up.  The following labs/tests are recommended: BMP.     Activity    Your activity upon discharge:  activity as tolerated     Full Code     Diet    Follow this diet upon discharge: Orders Placed This Encounter      Moderate Consistent Carb (60 g CHO per Meal) Diet      Diet       Significant Results and Procedures   Most Recent 3 CBC's:  Recent Labs   Lab Test 04/21/24  0600 04/20/24  0601 04/19/24  0617   WBC 11.3* 15.8* 14.6*   HGB 13.7 14.7 14.0   MCV 83 82 83    360 340     Most Recent 3 BMP's:  Recent Labs   Lab Test 04/22/24  1204 04/22/24  0753 04/22/24  0604 04/22/24  0206 04/21/24  0749 04/21/24  0600 04/20/24  0836 04/20/24  0601 04/19/24  0843 04/19/24  0617   NA  --   --   --   --   --  139  --  141  --  139   POTASSIUM  --   --  4.3  --   --  4.6  --  4.2  --  3.8   CHLORIDE  --   --   --   --   --  103  --  100  --  98   CO2  --   --   --   --   --  19*  --  29  --  29   BUN  --   --   --   --   --  54.4*  --  59.2*  --  73.7*   CR  --   --   --   --   --  1.50*  --  1.64*  --  2.02*   ANIONGAP  --   --   --   --   --  17*  --  12  --  12   TELLY  --   --   --   --   --  9.2  --  9.3  --  8.9   * 88  --  164*   < > 96   < > 105*   < > 100*    < > = values in this interval not displayed.     Most Recent 2 LFT's:  Recent Labs   Lab Test 12/27/23  1556 11/25/23  1215   AST 14 15   ALT 10 9   ALKPHOS 85 83   BILITOTAL 0.4 0.5     Most Recent 3 INR's:  Recent Labs   Lab Test 07/19/17  0655 09/21/16  1300   INR 0.93 0.99     Most Recent 3 Troponin's:  Recent Labs   Lab Test 03/16/19  0726 11/01/18  1242 11/01/18  0345   TROPI <0.015 <0.015 <0.015     Most Recent 3 BNP's:  Recent Labs   Lab Test 04/19/24  0617 04/16/24  1338 04/14/24  1231   NTBNPI 5,685* 8,593* 4,002*     Most Recent D-dimer:  Recent Labs   Lab Test 02/16/22  1750   DD 0.61*     Most Recent Cholesterol Panel:  Recent Labs   Lab Test 06/26/23  1421   CHOL 140   LDL 57   HDL 45*   TRIG 189*     7-Day Micro Results       Collected Updated Procedure Result Status      04/17/2024 1332 04/17/2024 2033 Respiratory Panel PCR  [[id number]]    (Abnormal)   Swab from Nasopharyngeal    Final result Component Value   No component results            04/17/2024 1332 04/17/2024 2033 Respiratory Panel PCR, Nasopharyngeal [[id number]]    (Abnormal)   Swab from Nasopharyngeal    Final result Component Value   Adenovirus Not Detected   Coronavirus Not Detected   This test detects Coronavirus 229E, HKU1, NL63 and OC43 but does not distinguish between them. It does not detect MERS ( Respiratory Syndrome), SARS (Severe Acute Respiratory Syndrome) or 2019-nCoV (Novel 2019) Coronavirus.   Human Metapneumovirus Detected   Human Rhin/Enterovirus Not Detected   Influenza A Not Detected   Influenza A, H1 Not Detected   Influenza A 2009 H1N1 Not Detected   Influenza A, H3 Not Detected   Influenza B Not Detected   Parainfluenza Virus 1 Not Detected   Parainfluenza Virus 2 Not Detected   Parainfluenza Virus 3 Not Detected   Parainfluenza Virus 4 Not Detected   Respiratory Syncytial Virus A Not Detected   Respiratory Syncytial Virus B Not Detected   Chlamydia Pneumoniae Not Detected   Mycoplasma Pneumoniae Not Detected            04/16/2024 1621 04/18/2024 0545 Urine Culture [[id number]]   Urine, Clean Catch    Final result Component Value   Culture 10,000-50,000 CFU/mL Mixture of Urogenital Roberta                     Most Recent TSH and T4:  Recent Labs   Lab Test 04/14/24  1512   TSH 1.76     Most Recent Urinalysis:  Recent Labs   Lab Test 04/16/24  1621 11/25/23  1253 08/23/23  1007   COLOR Brown*   < > Yellow   APPEARANCE Cloudy*   < > Slightly Cloudy*   URINEGLC Negative   < > Negative   URINEBILI Negative   < > Negative   URINEKETONE Negative   < > Negative   SG 1.022   < > 1.015   UBLD Large*   < > Small*   URINEPH 5.5   < > 5.5   PROTEIN 70*   < > 100*   UROBILINOGEN  --   --  0.2   NITRITE Negative   < > Positive*   LEUKEST Large*   < > Moderate*   RBCU >182*   < > 2-5*   WBCU >182*   < > 25-50*    < > = values in this interval not  displayed.     Most Recent ESR & CRP:  Recent Labs   Lab Test 20  0951   SED 36*   CRP 10.0*   ,   Results for orders placed or performed during the hospital encounter of 24   XR Chest Port 1 View    Narrative    EXAM: XR CHEST PORT 1 VIEW  LOCATION: [facility]  DATE: 2024    INDICATION: SHORTNESS OF BREATH  COMPARISON: 2023 and 2023      Impression    IMPRESSION: Markedly shallow rotation and mild patient rotation to the right. Mild mid and lower lung interstitial edema. No focal consolidation. Minimal pleural fluid and/or thickening inferior hemithoraces. Mild generalized cardiac enlargement and   borderline pulmonary venous congestion.   XR Chest 2 Views    Narrative    XR CHEST 2 VIEWS  2024 4:02 PM       INDICATION: follow up infiltrates  COMPARISON: 2024       Impression    IMPRESSION: Increased pulmonary vascular congestion and diffuse  interstitial opacities likely representing pulmonary edema when  compared to previous. Trace bilateral pleural effusions.    KASSANDRA SCHILLING MD         SYSTEM ID:  [id number]   Echocardiogram Complete    Narrative    [id number]  [facility]  [id number]  [id number]^LUCRETIA^CHEN^LOR     [facility]  Echocardiography Laboratory  [address] [place] [address]     Name: JOSIE ARRIAGA  MRN: [id number]  : 1933  Study Date: 04/15/2024 10:07 AM  Age: 89 yrs  Gender: Female  Patient Location: [facility]  Reason For Study: Atrial Fibrillation  Ordering Physician: CHEN BOYKIN  Referring Physician: GUI ERIC  Performed By: Julia Espinoza     BSA: 2.2 m2  Height: 67 in  Weight: 251 lb  HR: 75  BP: 112/96 mmHg  ______________________________________________________________________________  Procedure  Complete Echo Adult.  ______________________________________________________________________________  Interpretation Summary     Technically extremely difficult study as previously reported.  Last  echocardiogram was 3-1/2 months ago.     Normal LV size and systolic function. Study is inadequate to accurately assess  wall motion. Irregular rhythm is making wall motion analysis is challenging.  However there appears to be mild to moderate hypokinesis of the mid  anteroseptum on contrasted images.  The right ventricle is not well-seen. Probably borderline dilated. RV systolic  function is mildly/mild to moderately reduced.  Aortic valve is difficult to visualize. There is probably mild to moderate  aortic stenosis with a mean gradient of 14 to 18 mmHg. Valve area is likely  underestimated due to reduce stroke-volume index.  There is severe thickening of the mitral valve and mitral calcification noted.  Mean inflow gradient across the mitral valve ranging from 6 to 9 mmHg  consistent with moderate mitral stenosis.  Doppler suggest left to right interatrial shunt.  IVC is normal without pericardial effusion.  ______________________________________________________________________________  Right Ventricle  The right ventricle is normal size. Mildly decreased right ventricular  systolic function.     Atria  The left atrium is mild to moderately dilated. Right atrial size is normal.  Left to right atrial shunt, moderate.     Mitral Valve  Thickened mitral valve posterior leaflet. There is moderate mitral annular  calcification. The mean mitral valve gradient is 6.9 mmHg. The peak mitral  valve gradient is 15.6 mmHg. Calcified mitral apparatus causing mitral  stenosis. There is moderate mitral stenosis.     Aortic Valve  The aortic valve is not well visualized. The aortic valve is trileaflet with  aortic valve sclerosis. The mean AoV pressure gradient is 17.7 mmHg. The  calculated aortic valve are is 1.1 cm^2. The peak AoV pressure gradient is  31.0 mmHg. Mild to moderate valvular aortic stenosis.     Vessels  The aortic root is normal size. The inferior vena cava was normal in size with  preserved respiratory  variability.     Pericardium  There is no pericardial effusion.  ______________________________________________________________________________  MMode/2D Measurements & Calculations  IVSd: 1.1 cm  LVIDd: 4.7 cm  LVIDs: 3.2 cm  LVPWd: 1.0 cm  FS: 30.9 %  LV mass(C)d: 175.8 grams  LV mass(C)dI: 79.0 grams/m2  Ao root diam: 3.0 cm  asc Aorta Diam: 3.0 cm  LVOT diam: 2.1 cm  LVOT area: 3.5 cm2  Ao root diam index Ht(cm/m): 1.8  Ao root diam index BSA (cm/m2): 1.4  Asc Ao diam index BSA (cm/m2): 1.3  Asc Ao diam index Ht(cm/m): 1.8  LA Volume (BP): 89.2 ml     LA Volume Index (BP): 40.0 ml/m2  RWT: 0.43  TAPSE: 1.5 cm     Doppler Measurements & Calculations  MV E max julian: 170.7 cm/sec  MV max PG: 15.6 mmHg  MV mean P.9 mmHg  MV V2 VTI: 39.6 cm  MVA(VTI): 1.7 cm2  MV dec slope: 733.7 cm/sec2  MV dec time: 0.23 sec  Ao V2 max: 278.7 cm/sec  Ao max P.0 mmHg  Ao V2 mean: 195.9 cm/sec  Ao mean P.7 mmHg  Ao V2 VTI: 62.0 cm  MEL(I,D): 1.1 cm2  MEL(V,D): 1.2 cm2  LV V1 max PG: 3.8 mmHg  LV V1 max: 95.3 cm/sec  LV V1 VTI: 19.7 cm  SV(LVOT): 68.4 ml  SI(LVOT): 30.7 ml/m2     PA V2 max: 87.3 cm/sec  PA max PG: 3.0 mmHg  PA acc time: 0.10 sec  AV Julian Ratio (DI): 0.34  MEL Index (cm2/m2): 0.50  E/E' av.7  Lateral E/e': 29.2  Medial E/e': 28.2  RV S Julian: 6.9 cm/sec     ______________________________________________________________________________  Report approved by: Bere Amado 04/15/2024 11:55 AM         Echocardiogram Limited     Value    LVEF  55-60%    Narrative    [id number]  [id number]  [id number]  [id number]^ARIAS^PETER^RAJI     [facility]  Echocardiography Laboratory  [address] [place] [address]     Name: JOSIE ARRIAGA  MRN: [id number]  : 1933  Study Date: 2024 03:41 PM  Age: 89 yrs  Gender: Female  Patient Location: [facility]  Reason For Study: CHF  Ordering Physician: MARTINA BIANCHI  Performed By: Julia Espinoza      ______________________________________________________________________________  Procedure  Limited Echo Adult.  ______________________________________________________________________________  Interpretation Summary     The inferior vena cava was normal in size with preserved respiratory  variability.  IVC diameter <2.1 cm collapsing >50% with sniff suggests a normal RA pressure  of 3 mmHg.  ______________________________________________________________________________  Left Ventricle  The visual ejection fraction is 55-60%.     Mitral Valve  There is moderate mitral annular calcification. The mitral valve leaflets are  severely thickened.     Vessels  The inferior vena cava was normal in size with preserved respiratory  variability. IVC diameter <2.1 cm collapsing >50% with sniff suggests a normal  RA pressure of 3 mmHg.     ______________________________________________________________________________  Report approved by: Bere Schroeder 04/19/2024 04:19 PM     ______________________________________________________________________________          Discharge Medications   Current Discharge Medication List        START taking these medications    Details   miconazole (MICATIN) 2 % external cream Apply topically 2 times daily for 5 days  Qty: 14 g, Refills: 0    Associated Diagnoses: Tinea corporis      predniSONE (DELTASONE) 10 MG tablet 4 tabs daily for 2 days, then 3 tabs daily for 2 days, then 2 tabs daily for 2 days, then 1 tab daily for 2 days, then stop  Qty: 20 tablet, Refills: 0    Associated Diagnoses: COPD exacerbation (H)           CONTINUE these medications which have NOT CHANGED    Details   acetaminophen (TYLENOL) 500 MG tablet Take 1,000 mg by mouth 2 times daily      albuterol (PROAIR HFA/PROVENTIL HFA/VENTOLIN HFA) 108 (90 Base) MCG/ACT inhaler Inhale 2 puffs into the lungs every 6 hours as needed for shortness of breath, wheezing or cough For shortness of breath    Comments: Pharmacy may  dispense brand covered by insurance (Proair, or proventil or ventolin or generic albuterol inhaler)  Associated Diagnoses: SOB (shortness of breath)      apixaban ANTICOAGULANT (ELIQUIS) 2.5 MG tablet Take 1 tablet (2.5 mg) by mouth 2 times daily  Qty: 180 tablet, Refills: 3    Associated Diagnoses: Atrial fibrillation with RVR (H)      atorvastatin (LIPITOR) 20 MG tablet Take 1 tablet (20 mg) by mouth daily for cholesterol  Qty: 100 tablet, Refills: 3    Associated Diagnoses: Hyperlipidemia LDL goal <70      buPROPion (WELLBUTRIN SR) 100 MG 12 hr tablet Take 1 tablet (100 mg) by mouth daily for mood  Qty: 90 tablet, Refills: 3    Associated Diagnoses: Moderate major depression (H)      calcium carbonate (TUMS) 500 MG chewable tablet Take 1 chew tab by mouth 2 times daily as needed for heartburn      DULoxetine (CYMBALTA) 60 MG capsule Take 1 capsule (60 mg) by mouth daily  Qty: 90 capsule, Refills: 3    Associated Diagnoses: Moderate major depression (H)      fexofenadine (ALLEGRA) 180 MG tablet Take 180 mg by mouth every evening      fluticasone-salmeterol (WIXELA INHUB) 100-50 MCG/ACT inhaler USE 1 INHALATION BY MOUTH EVERY  12 HOURS  Qty: 180 each, Refills: 3    Associated Diagnoses: Mild intermittent asthma without complication      glimepiride (AMARYL) 2 MG tablet Take 1 tablet (2 mg) by mouth 2 times daily (before meals)  Qty: 200 tablet, Refills: 2    Comments: Dose is increased  Associated Diagnoses: Type 2 diabetes mellitus with diabetic nephropathy, without long-term current use of insulin (H)      Menthol (Topical Analgesic) 7.5 % (Roll) MISC Apply topically every evening Apply to affected area      Menthol, Topical Analgesic, (ICY HOT MEDICATED SPRAY EX) Externally apply topically daily as needed (pain, in neck, back, hand)      metoprolol tartrate (LOPRESSOR) 25 MG tablet Take 3 tablets (75 mg) by mouth 2 times daily  Qty: 540 tablet, Refills: 1    Comments: Dose is changed  Associated Diagnoses:  Atrial fibrillation with RVR (H)      Multiple Vitamins-Minerals (HAIR SKIN AND NAILS FORMULA PO) Take 1 tablet by mouth daily      nitroGLYcerin (NITROSTAT) 0.4 MG sublingual tablet FOR CHEST PAIN PLACE 1 TABLET  UNDER TONGUE EVERY 5 MINUTES FOR 3 DOSES. IF SYMPTOMS PERSIST 5  MINUTES AFTER 1ST DOSE CALL 911  Qty: 25 tablet, Refills: 0    Associated Diagnoses: Chest pain, unspecified type      pantoprazole (PROTONIX) 20 MG EC tablet Take 1 tablet (20 mg) by mouth daily  Qty: 90 tablet, Refills: 3    Associated Diagnoses: Gastroesophageal reflux disease without esophagitis      torsemide (DEMADEX) 20 MG tablet Take 2 tablets (40 mg) by mouth daily  Qty: 270 tablet, Refills: 1    Comments: Please inform the patient that due to insurance reason the this  was changed from 40 mg to 20 mg 2 tablets  Associated Diagnoses: Heart failure with preserved ejection fraction, NYHA class I (H)      Vitamin D3 (CHOLECALCIFEROL) 25 mcg (1000 units) tablet Take 25 mcg by mouth daily           Allergies   Allergies   Allergen Reactions    Aspirin Hives     Reaction occurred during childhood.     Lidocaine Itching    Lisinopril Cough    Losartan      Hyperkalemia      Metformin      Elevated lactic acid    Minocycline      Yellow Dye Allergy. Minocycline has Yellow Dye #10.    Mounjaro [Tirzepatide] Diarrhea and GI Disturbance    Salicylates Hives    Yellow Dye Hives     Rxn to yellow tablet. Eyes swelled shut.     Yellow Dyes (Non-Tartrazine) Hives

## 2024-04-22 NOTE — PROGRESS NOTES
Care Management Discharge Note    Discharge Date: 04/22/2024       Discharge Disposition:  home with homecare    Discharge Services:  homecare    Discharge DME:      Discharge Transportation: car, drives self    Private pay costs discussed: Not applicable    Does the patient's insurance plan have a 3 day qualifying hospital stay waiver?  No    PAS Confirmation Code:    Patient/family educated on Medicare website which has current facility and service quality ratings:      Education Provided on the Discharge Plan:    Persons Notified of Discharge Plans: patient, [facility] homecare, PCP clinic  Patient/Family in Agreement with the Plan: yes    Handoff Referral Completed: Yes    Additional Information:  Patient has declined recommendation for TCU and will discharge with homecare RN/PT/OT thru [facility].  PCP and cardiology follow up already arranged and added to AVS.    Kandi Murphy RN Care Coordinator  [facility]  [phone]

## 2024-04-22 NOTE — PROGRESS NOTES
VSS.  No pain at time of DC.  DC education complete, meds, f/u & instructions.  Meds filled in house. With pt at time of DC.  Pt states all belongings are accounted for.  Family will transport pt home.

## 2024-04-23 LAB
ATRIAL RATE - MUSE: 250 BPM
DIASTOLIC BLOOD PRESSURE - MUSE: NORMAL MMHG
INTERPRETATION ECG - MUSE: NORMAL
P AXIS - MUSE: NORMAL DEGREES
PR INTERVAL - MUSE: NORMAL MS
QRS DURATION - MUSE: 102 MS
QT - MUSE: 412 MS
QTC - MUSE: 475 MS
R AXIS - MUSE: -43 DEGREES
SYSTOLIC BLOOD PRESSURE - MUSE: NORMAL MMHG
T AXIS - MUSE: 71 DEGREES
VENTRICULAR RATE- MUSE: 80 BPM

## 2024-04-23 NOTE — PLAN OF CARE
Physical Therapy Discharge Summary    Reason for therapy discharge:    Discharged to home with home therapy.    Progress towards therapy goal(s). See goals on Care Plan in [facility] electronic health record for goal details.  Goals partially met.  Barriers to achieving goals:   discharge from facility.    Therapy recommendation(s):    Continued therapy is recommended.  Rationale/Recommendations:  To further increase independence with mobility.

## 2024-04-23 NOTE — PLAN OF CARE
Occupational Therapy Discharge Summary    Reason for therapy discharge:    Discharged to home with home therapy.    Progress towards therapy goal(s). See goals on Care Plan in [facility] electronic health record for goal details.  Goals partially met.  Barriers to achieving goals:   discharge from facility.    Therapy recommendation(s):    Continued therapy is recommended.  Rationale/Recommendations:  Pt lives alone and reports being IND with all ADL, IADL, mobility using walker. Currently requiring A of 1 for all ADLs. Recommend TCU to address safety and IND in I/ADLs. If home, then assist in all I/ADls and home OT.

## 2024-04-24 ENCOUNTER — MEDICAL CORRESPONDENCE (OUTPATIENT)
Dept: HEALTH INFORMATION MANAGEMENT | Facility: CLINIC | Age: 89
End: 2024-04-24

## 2024-04-24 ENCOUNTER — TELEPHONE (OUTPATIENT)
Dept: FAMILY MEDICINE | Facility: CLINIC | Age: 89
End: 2024-04-24

## 2024-04-24 ENCOUNTER — PATIENT OUTREACH (OUTPATIENT)
Dept: CARE COORDINATION | Facility: CLINIC | Age: 89
End: 2024-04-24

## 2024-04-24 NOTE — TELEPHONE ENCOUNTER
Home Care is calling regarding an established patient with [facility].        10/18/2023    11:57 AM   Home Care Information   Date of Home Care episode start 10/18/2023   Current following provider Dr. Churchill     Requesting orders from: Maryan Churchill  Provider is following patient: Yes  Is this a 60-day recertification request?  Yes    Orders Requested     Skilled Nursing  Request for recertification   Frequency:  2x/wk for 4 wks    2.  Physical Therapy and OT for start of care   Request for recertification   Frequency:  2x/wk for 2 wks    3. Home Health Aid one time per week for 4 weeks.     4.  for evaluate for one time.        Provider review needed.  RN will contact Home Care with information after provider review.  Confirmed ok to leave a detailed message with call back.  Contact information confirmed and updated as needed.    Xiomy Tellez RN

## 2024-04-24 NOTE — PROGRESS NOTES
Clinic Care Coordination Contact  [facility]/Voicemail    Clinical Data: Care Coordinator Outreach    Outreach Documentation Number of Outreach Attempt   3/29/2024   3:38 PM 1   4/24/2024   3:53 PM 1       Left message on patient's voicemail with call back information and requested return call.    Plan: Care Coordinator will try to reach patient again in 1-2 business days.    Maria L Mckeon,  [facility]  Clinic Care Coordinator  [facility]  [phone]  [email or web address]

## 2024-04-25 ENCOUNTER — PATIENT OUTREACH (OUTPATIENT)
Dept: CARE COORDINATION | Facility: CLINIC | Age: 89
End: 2024-04-25
Payer: COMMERCIAL

## 2024-04-25 ASSESSMENT — ACTIVITIES OF DAILY LIVING (ADL): DEPENDENT_IADLS:: CLEANING;SHOPPING;TRANSPORTATION

## 2024-04-25 NOTE — PROGRESS NOTES
"Clinic SW Care Coordination Contact  [facility]: Post-Discharge Note  SITUATION                                                      Admission:    Admission Date: 04/14/24 to [facility].    Reason for Admission: She presents with complaints of escalating wheezing/HENDERSON and cough  Discharge:   Discharge Date: 04/22/24  Discharge Diagnosis: Chronic atrial fibrillation   Anticoagulated  HFpEF, decompensated  BNP 4000   Lactic Acidosis   2.7 > 2.0 ...  Elevated troponin (35 -  this is \"baseline\" per review of chart)--> 42, 33  Aortic Stenosis, moderate    Mitral Valve Stenosis, mild, with severe calcification    BACKGROUND                                                      Per hospital discharge summary and inpatient provider notes:    89 yo woman . Lives alone in apt. Neighbors and son / DIL help as needed. Declined TCU returned  home with Home care,  Family help with transportation. Has [employer] [facility] but has not used yet. Open to Care coordination.   Home care was out to her home yesterday.              Discharge Assessment  How are you doing now that you are home?: good  How are your symptoms? (Red Flag symptoms escalate to triage hotline per guidelines): Improved  Do you feel your condition is stable enough to be safe at home until your provider visit?: Yes  Does the patient have their discharge instructions? : Yes  Does the patient have questions regarding their discharge instructions? : No  Were you started on any new medications or were there changes to any of your previous medications? : Yes  Does the patient have all of their medications?: Yes  Do you have questions regarding any of your medications? : No  Do you have all of your needed medical supplies or equipment (DME)?  (i.e. oxygen tank, CPAP, cane, etc.): Yes  Discharge follow-up appointment scheduled within 14 calendar days? : Yes  Discharge Follow Up Appointment Date: 04/29/24  Discharge Follow Up Appointment Scheduled with?: Primary Care " Provider                  PLAN                                                      Outpatient Plan:  Pt will work with Home Care, Follow up with PCP 4/29/24.   [facility] will  follow in 1 month.    Future Appointments   Date Time Provider Department Center   4/29/2024  1:30 PM Maryan Churchill MD [facility]    4/29/2024  3:30 PM Noam Nieto MD [facility]   6/27/2024 11:00 AM Maryan Churchill MD [facility]    7/1/2024  8:50 AM Bebe Llamas, CNP [facility]   9/24/2024  4:15 PM Jozef Sinha MD [facility]   Goals   Patient expressed understanding of goal: yes  Action steps to achieve this goal:  1. I will continue to follow up with medical team as needed appt 4/29  2. I will sign up for [employer] [facility] (4/24 Signed up will use)  3. I will talk to a  to get more insights into senior living  4. I will consider signing up for [employer] assessment 4/24 Will be calling   5. I will look into volunteer opportunities, possibly stamping classes at [facility]   6. I will outreach to Care Coordination  for further questions or concerns    For any urgent concerns, please contact our 24 hour nurse triage line: [phone] ([phone])            STEF Cornell / tello Mckeon [facility]   Care Coordination  [facility]  4/25/2024 9:40 AM

## 2024-04-29 ENCOUNTER — OFFICE VISIT (OUTPATIENT)
Dept: SLEEP MEDICINE | Facility: CLINIC | Age: 89
End: 2024-04-29
Payer: COMMERCIAL

## 2024-04-29 ENCOUNTER — OFFICE VISIT (OUTPATIENT)
Dept: FAMILY MEDICINE | Facility: CLINIC | Age: 89
End: 2024-04-29
Payer: COMMERCIAL

## 2024-04-29 VITALS
OXYGEN SATURATION: 94 % | WEIGHT: 241 LBS | HEIGHT: 67 IN | DIASTOLIC BLOOD PRESSURE: 67 MMHG | HEART RATE: 54 BPM | BODY MASS INDEX: 37.83 KG/M2 | SYSTOLIC BLOOD PRESSURE: 97 MMHG

## 2024-04-29 VITALS
DIASTOLIC BLOOD PRESSURE: 86 MMHG | SYSTOLIC BLOOD PRESSURE: 126 MMHG | WEIGHT: 246.3 LBS | OXYGEN SATURATION: 95 % | HEART RATE: 96 BPM | RESPIRATION RATE: 18 BRPM | TEMPERATURE: 97.5 F | BODY MASS INDEX: 38.58 KG/M2

## 2024-04-29 DIAGNOSIS — I25.119 CORONARY ARTERY DISEASE INVOLVING NATIVE CORONARY ARTERY OF NATIVE HEART WITH ANGINA PECTORIS (H): ICD-10-CM

## 2024-04-29 DIAGNOSIS — E66.2 OBESITY HYPOVENTILATION SYNDROME (H): ICD-10-CM

## 2024-04-29 DIAGNOSIS — R29.818 SUSPECTED SLEEP APNEA: Primary | ICD-10-CM

## 2024-04-29 DIAGNOSIS — Z79.899 MEDICATION MANAGEMENT: ICD-10-CM

## 2024-04-29 DIAGNOSIS — G47.33 OSA (OBSTRUCTIVE SLEEP APNEA): ICD-10-CM

## 2024-04-29 DIAGNOSIS — E11.21 TYPE 2 DIABETES MELLITUS WITH DIABETIC NEPHROPATHY, WITHOUT LONG-TERM CURRENT USE OF INSULIN (H): ICD-10-CM

## 2024-04-29 DIAGNOSIS — J44.1 COPD EXACERBATION (H): ICD-10-CM

## 2024-04-29 DIAGNOSIS — I50.30 HEART FAILURE WITH PRESERVED EJECTION FRACTION, NYHA CLASS I (H): Primary | ICD-10-CM

## 2024-04-29 PROBLEM — J96.01 ACUTE RESPIRATORY FAILURE WITH HYPOXIA (H): Status: RESOLVED | Noted: 2023-10-10 | Resolved: 2024-04-29

## 2024-04-29 PROCEDURE — 99214 OFFICE O/P EST MOD 30 MIN: CPT | Performed by: INTERNAL MEDICINE

## 2024-04-29 PROCEDURE — 99495 TRANSJ CARE MGMT MOD F2F 14D: CPT | Performed by: INTERNAL MEDICINE

## 2024-04-29 ASSESSMENT — SLEEP AND FATIGUE QUESTIONNAIRES
HOW LIKELY ARE YOU TO NOD OFF OR FALL ASLEEP WHILE SITTING AND READING: SLIGHT CHANCE OF DOZING
HOW LIKELY ARE YOU TO NOD OFF OR FALL ASLEEP IN A CAR, WHILE STOPPED FOR A FEW MINUTES IN TRAFFIC: WOULD NEVER DOZE
HOW LIKELY ARE YOU TO NOD OFF OR FALL ASLEEP WHEN YOU ARE A PASSENGER IN A CAR FOR AN HOUR WITHOUT A BREAK: SLIGHT CHANCE OF DOZING
HOW LIKELY ARE YOU TO NOD OFF OR FALL ASLEEP WHILE WATCHING TV: SLIGHT CHANCE OF DOZING
HOW LIKELY ARE YOU TO NOD OFF OR FALL ASLEEP WHILE SITTING QUIETLY AFTER LUNCH WITHOUT ALCOHOL: SLIGHT CHANCE OF DOZING
HOW LIKELY ARE YOU TO NOD OFF OR FALL ASLEEP WHILE SITTING AND TALKING TO SOMEONE: SLIGHT CHANCE OF DOZING
HOW LIKELY ARE YOU TO NOD OFF OR FALL ASLEEP WHILE SITTING INACTIVE IN A PUBLIC PLACE: SLIGHT CHANCE OF DOZING
HOW LIKELY ARE YOU TO NOD OFF OR FALL ASLEEP WHILE LYING DOWN TO REST IN THE AFTERNOON WHEN CIRCUMSTANCES PERMIT: SLIGHT CHANCE OF DOZING

## 2024-04-29 ASSESSMENT — PATIENT HEALTH QUESTIONNAIRE - PHQ9: SUM OF ALL RESPONSES TO PHQ QUESTIONS 1-9: 2

## 2024-04-29 ASSESSMENT — PAIN SCALES - GENERAL: PAINLEVEL: NO PAIN (0)

## 2024-04-29 NOTE — PROGRESS NOTES
Sleep Consultation:    Date on this visit: 4/29/2024    Moira Andre  is referred by No ref. provider found for a sleep consultation.     Primary Physician: Maryan Churchill     Moira Andre presents to clinic for management of sleep apnea.     Her medical history significant for chronic atrial fibrillation, heart failure with preserved ejection fraction, coronary artery disease, DM-2, aortic stenosis & mitral valve stenosis.  She was recently hospitalized with shortness of breath and hypoxic respiratory failure, presumed to be acute CHF versus pneumonia.    Patient was diagnosed with obstructive sleep apnea more than 20 years ago.  Previous testing is not available.     She was treated with CPAP therapy for many years.  She was last seen in our clinic in 2016 and obtained a new auto CPAP device at the time.  She was noted to have a satisfactory response with auto CPAP.    Patient reports that she has not used CPAP for more than a year.  She has a [employer] Respironics device which is under  recall.    Moira goes to sleep at 12:00 AM. She wakes up at 7:30 AM. She falls asleep in 15 minutes.  Moira denies difficulty falling asleep.  She wakes up 1-2 times a night for 5 minutes before falling back to sleep.  Moira wakes up to go to the bathroom. Patient gets an average of 7 hours of sleep per night.     Moira does snore frequently. She does have witnessed apneas.  Patient sleeps on her side. She has frequent morning dry mouth, denies morning headaches or restless legs.     Moira denies any sleep walking, sleep talking, dream enactment, sleep paralysis, cataplexy, and hypnogogic/hypnopompic hallucinations.    Moira denies difficulty breathing through her nose.      Patient's McRae Sleepiness score 7/24.      Moira naps 1-2 times per day for 20-30 minutes, feels refreshed after naps. She takes frequent inadvertant naps.  She denies closing eyes, dozing, and falling asleep while driving. Patient was  "counseled on the importance of driving while alert, to pull over if drowsy, or nap before getting into the vehicle if sleepy.      She uses no caffeine for last 6 months.     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    Myocardial infarction (H) 2007, cath  ml    BMS: stent to OM, diag, nl EF, echo /C angia  , f/u cath no lesion >40%    Nephrolithiasis     right side    OA (osteoarthritis) of knee     Obesity     Rheumatoid arthritis flare (H)     prednisone    Sleep apnea     restarted using cpap     TIA (transient ischaemic attack)     Ventral hernia, unspecified, without mention of obstruction or gangrene      Social History     Tobacco Use    Smoking status: Former     Current packs/day: 0.00     Average packs/day: 0.3 packs/day for 1.3 years (0.3 ttl pk-yrs)     Types: Cigarettes     Start date:      Quit date: 1973     Years since quittin.0    Smokeless tobacco: Never   Vaping Use    Vaping status: Never Used   Substance Use Topics    Alcohol use: Yes     Alcohol/week: 0.0 - 1.0 standard drinks of alcohol     Comment: rarely    Drug use: No     Physical Examination:  Vitals: BP 97/67   Pulse 54   Ht 1.702 m (5' 7\")   Wt 109.3 kg (241 lb)   SpO2 94%   BMI 37.75 kg/m    BMI= Body mass index is 37.75 kg/m .  GENERAL APPEARANCE: healthy, alert, and no distress  HENT: oropharynx crowded  NEURO: mentation intact and speech normal  PSYCH: " mentation appears normal and affect normal/bright  Mallampati Class: IV.  Tonsillar Stage: 1  hidden by pillars.    Impression/Plan:    Obstructive sleep apnea    Patient is currently prescribed auto CPAP therapy with a pressure range of 5 to 15 cm H2O.  She has not used therapy for more than a year.  She has a [employer] CPAP device which is under  recall.  Patient wants to restart CPAP therapy as recommended by her primary care  in the context of her medical comorbidity and recent hospitalization with respiratory failure.  We do not have information on her diagnostic testing which was performed more than 20 years ago.     In order to reassess sleep disordered breathing, sleep study is recommended.  Patient wants to perform home sleep apnea testing.    He can also try if [employer] will provide her with a replacement device.    Plan:     Home sleep apnea testing     She will follow up with me in approximately two weeks after her sleep study has been competed to review the results and discuss plan of care.       Polysomnography & HST reviewed.  Limitations of HST reviewed.   Obstructive sleep apnea reviewed.  Complications of untreated sleep apnea were reviewed.    I spent a total of 45 minutes for this appointment on this date of service which include time spent before, during and after the visit for chart review, patient care, counseling and coordination of care.      Dr. Noam Nieto     CC: No ref. provider found

## 2024-04-29 NOTE — PATIENT INSTRUCTIONS
You are due for RSV, Covid booster     Keep follow up appointment as scheduled     Telephone visit in 4 weeks

## 2024-04-29 NOTE — PROGRESS NOTES
Kristyn was seen today for hospital f/u.    Diagnoses and all orders for this visit:    Heart failure with preserved ejection fraction, NYHA class I (H)  She presented to ER on 04/14/2024 with increasing HENDERSON, wheezing and cough. She was hospitalized for eight days and was diagnosed with CHF [BNP 4000], TIFFANI, pneumonia and acute respiratory failure with hypoxia. She was discharged back home with prednisone - she did not do any rehab.   She was treated in hospital for above condtion    At the present, patient states that she is feeling much better although she still has some cough. She is also done with prednisone.   Update us if you notice increasing leg edema.    Monitor you weight daily  Watch your salt intake      COPD exacerbation (H)  See note above  She is feeling much better although she still has some cough.   She is also done with prednisone.   She is on inhaler     Medication management  Follow up with me in 06/2024.     ELIGIO (obstructive sleep apnea)  She plans to get her Cpap machine today.   Discussed the importance of using CPAP    Obesity hypoventilation syndrome (H)  She is very obese that also affects her breathing.     Type 2 diabetes mellitus with diabetic nephropathy, without long-term current use of insulin (H)  Home BGL is ranging around 200s.   Her glimepiride was discontinued during hospitalization - she was put on insulin. She was also on prednisone which she finished yesterday. She is on glimepiride again after discharge  We use insulin in hospital setting   She is now back on medication.     Coronary artery disease involving native coronary artery of native heart with angina pectoris (H24)  She has appointment with cardiology in 07/2024.     Other  Advised patient to get RSV, COVID-19 booster and shingrix vaccine at pharmacy when she has recovered from her cough.    Guanako Simons is a 89 year old, presenting for the following health issues:  Hospital F/U         No data to display        "       [facility]         4/25/2024     9:12 AM   Post Discharge Outreach   Admission Date 4/14/2024   Reason for Admission She presents with complaints of escalating wheezing/HENDERSON and cough   Discharge Date 4/22/2024   Discharge Diagnosis Chronic atrial fibrillation   Anticoagulated  HFpEF, decompensated  BNP 4000   Lactic Acidosis   2.7 > 2.0 ...  Elevated troponin (35 -  this is \"baseline\" per review of chart)--> 42, 33  Aortic Stenosis, moderate    Mitral Valve Stenosis, mild, with severe calcification   How are you doing now that you are home? good   How are your symptoms? (Red Flag symptoms escalate to triage hotline per guidelines) Improved   Do you feel your condition is stable enough to be safe at home until your provider visit? Yes   Does the patient have their discharge instructions?  Yes   Does the patient have questions regarding their discharge instructions?  No   Were you started on any new medications or were there changes to any of your previous medications?  Yes   Does the patient have all of their medications? Yes   Do you have questions regarding any of your medications?  No   Do you have all of your needed medical supplies or equipment (DME)?  (i.e. oxygen tank, CPAP, cane, etc.) Yes   Discharge follow-up appointment scheduled within 14 calendar days?  Yes   Discharge Follow Up Appointment Date 4/29/2024   Discharge Follow Up Appointment Scheduled with? Primary Care Provider     Hospital Follow-up Visit:    Hospital/Nursing Home/IP Rehab Facility: [facility]  Date of Admission: 4/14/24  Date of Discharge: 4/22/24  Reason(s) for Admission: Chronic atrial fibrillation   Anticoagulated  HFpEF, decompensated  BNP 4000   Lactic Acidosis   2.7 > 2.0 ...  Elevated troponin (35 -  this is \"baseline\" per review of chart)--> 42, 33  Aortic Stenosis, moderate    Mitral Valve Stenosis, mild, with severe calcification  Was the patient in the ICU or did the patient experience delirium during " hospitalization?  No  Do you have any other stressors you would like to discuss with your provider? No    Problems taking medications regularly:  None  Medication changes since discharge: None  Problems adhering to non-medication therapy:  None    Summary of hospitalization:  [facility] discharge summary reviewed  Diagnostic Tests/Treatments reviewed.  Follow up needed:   Other Healthcare Providers Involved in Patient s Care:         Care Coordination and Specialist appointment - cardiology  Update since discharge: improved.         Plan of care communicated with patient                 Review of Systems  Constitutional, HEENT, cardiovascular, pulmonary, GI, , musculoskeletal, neuro, skin, endocrine and psych systems are negative, except as otherwise noted.      Objective    /86 (BP Location: Right arm, Patient Position: Sitting, Cuff Size: Adult Large)   Pulse 96   Temp 97.5  F (36.4  C) (Temporal)   Resp 18   Wt 111.7 kg (246 lb 4.8 oz)   SpO2 95%   BMI 38.58 kg/m    Body mass index is 38.58 kg/m .  Physical Exam   GENERAL APPEARANCE: healthy, alert and no distress; she was able to finish her conversation without any shortness of breath.   EYES: Eyes grossly normal to inspection, PERRL and conjunctivae and sclerae normal  HENT: ear canals and TM's normal and nose and mouth without ulcers or lesions  NECK: no adenopathy  RESP: lungs clear to auscultation - no rales, rhonchi or wheezes  CV: regular rates and rhythm, normal S1 S2, no S3  She walks with help of walker         Signed Electronically by: Maryan Churchill MD      This document serves as a record of the services and decisions personally performed and made by Dr. Churchill. It was created on her behalf by Naomi Garcia, a trained medical scribe. The creation of this document is based the provider's statements to the medical scribe.

## 2024-04-29 NOTE — NURSING NOTE
"Chief Complaint   Patient presents with    Sleep Problem     Need a new CPAP       Initial BP 97/67   Pulse 54   Ht 1.702 m (5' 7\")   Wt 109.3 kg (241 lb)   SpO2 94%   BMI 37.75 kg/m   Estimated body mass index is 37.75 kg/m  as calculated from the following:    Height as of this encounter: 1.702 m (5' 7\").    Weight as of this encounter: 109.3 kg (241 lb).    Medication Reconciliation: complete  ESS 7  Neck circumference: 42 centimeters.  Mary Alice Treviño MA   "

## 2024-04-30 ENCOUNTER — CARE COORDINATION (OUTPATIENT)
Dept: SLEEP MEDICINE | Facility: CLINIC | Age: 89
End: 2024-04-30
Payer: COMMERCIAL

## 2024-04-30 ENCOUNTER — TELEPHONE (OUTPATIENT)
Dept: FAMILY MEDICINE | Facility: CLINIC | Age: 89
End: 2024-04-30
Payer: COMMERCIAL

## 2024-04-30 NOTE — TELEPHONE ENCOUNTER
Home Care is calling regarding an established patient with  [facility].        10/18/2023    11:57 AM   Home Care Information   Date of Home Care episode start 10/18/2023   Current following provider Dr. Churchill     Requesting orders from: Maryan Churchill  Provider is following patient: Yes  Is this a 60-day recertification request?  No    Orders Requested    Occupational Therapy  Request for initial certification (first set of orders)   Frequency:  eowx/wk for 8 wks  ADLs + Adaptive Equipment    Confirmed ok to leave a detailed message with call back.  Contact information confirmed and updated as needed.    Tim Nguyễn, RN

## 2024-05-02 ENCOUNTER — PATIENT OUTREACH (OUTPATIENT)
Dept: CARE COORDINATION | Facility: CLINIC | Age: 89
End: 2024-05-02
Payer: COMMERCIAL

## 2024-05-06 ENCOUNTER — PATIENT OUTREACH (OUTPATIENT)
Dept: CARE COORDINATION | Facility: CLINIC | Age: 89
End: 2024-05-06

## 2024-05-06 ENCOUNTER — TELEPHONE (OUTPATIENT)
Dept: FAMILY MEDICINE | Facility: CLINIC | Age: 89
End: 2024-05-06

## 2024-05-06 NOTE — TELEPHONE ENCOUNTER
Home Care is calling regarding an established patient with  [facility].        10/18/2023    11:57 AM   Home Care Information   Date of Home Care episode start 10/18/2023   Current following provider Dr. Churchill     Requesting orders from: Maryan Churchill  Provider is following patient: Yes      Orders Requested    Physical Therapy  Request for  1 visit every other week x 6 wks.     Confirmed ok to leave a detailed message with call back.  Contact information confirmed and updated as needed.    Reyna Castillo RN

## 2024-05-15 DIAGNOSIS — E11.21 TYPE 2 DIABETES MELLITUS WITH DIABETIC NEPHROPATHY (H): ICD-10-CM

## 2024-05-15 DIAGNOSIS — E11.21 TYPE 2 DIABETES MELLITUS WITH DIABETIC NEPHROPATHY, WITHOUT LONG-TERM CURRENT USE OF INSULIN (H): ICD-10-CM

## 2024-05-15 RX ORDER — GLUCOSAM/CHON-MSM1/C/MANG/BOSW 500-416.6
1 TABLET ORAL 2 TIMES DAILY
Qty: 100 EACH | Refills: 1 | Status: SHIPPED | OUTPATIENT
Start: 2024-05-15 | End: 2024-05-22

## 2024-05-15 RX ORDER — CALCIUM CITRATE/VITAMIN D3 200MG-6.25
100 TABLET ORAL DAILY
Qty: 100 STRIP | Refills: 1 | Status: SHIPPED | OUTPATIENT
Start: 2024-05-15 | End: 2024-05-22

## 2024-05-15 NOTE — TELEPHONE ENCOUNTER
Pt called requesting a glucometer, lancet device and test strips. Pt has been checking BS once daily but ran out of supplies. She is requesting a new glucometer since her current monitor is old and pt is not sure if insurance will continue to cover same brand.     Routing refill request to provider for review/approval because:  Medication is reported/historical

## 2024-05-17 ENCOUNTER — TELEPHONE (OUTPATIENT)
Dept: FAMILY MEDICINE | Facility: CLINIC | Age: 89
End: 2024-05-17
Payer: COMMERCIAL

## 2024-05-17 NOTE — TELEPHONE ENCOUNTER
Disp Refills Start End PRO    TRUEplus Lancets 33G MISC 100 each 1 5/15/2024 -- No   Sig - Route: 1 each 2 times daily - Does not apply   Sent to pharmacy as: TRUEplus Lancets 33G   Class: E-Prescribe   Notes to Pharmacy: Pt needs lancets and device. True Metrix or insurance covered alternative.     Pharmacy is requesting separate RX for lancing device, RX sent for lancets but comment states pt needs lancets and device. No RX for lancing device was sent.

## 2024-05-20 NOTE — TELEPHONE ENCOUNTER
Called patient regarding message below. Advised her that this was sent to pharmacy on 05/15/24. She verbalized understanding.

## 2024-05-21 ENCOUNTER — OFFICE VISIT (OUTPATIENT)
Dept: SLEEP MEDICINE | Facility: CLINIC | Age: 89
End: 2024-05-21
Payer: COMMERCIAL

## 2024-05-21 ENCOUNTER — TELEPHONE (OUTPATIENT)
Dept: FAMILY MEDICINE | Facility: CLINIC | Age: 89
End: 2024-05-21

## 2024-05-21 DIAGNOSIS — G47.36 HYPOXEMIA ASSOCIATED WITH SLEEP: ICD-10-CM

## 2024-05-21 DIAGNOSIS — G47.33 OSA (OBSTRUCTIVE SLEEP APNEA): Primary | ICD-10-CM

## 2024-05-21 DIAGNOSIS — R29.818 SUSPECTED SLEEP APNEA: ICD-10-CM

## 2024-05-21 DIAGNOSIS — E11.21 TYPE 2 DIABETES MELLITUS WITH DIABETIC NEPHROPATHY, WITHOUT LONG-TERM CURRENT USE OF INSULIN (H): Primary | ICD-10-CM

## 2024-05-21 DIAGNOSIS — E11.21 TYPE 2 DIABETES MELLITUS WITH DIABETIC NEPHROPATHY (H): ICD-10-CM

## 2024-05-21 PROCEDURE — 95800 SLP STDY UNATTENDED: CPT | Mod: 52 | Performed by: INTERNAL MEDICINE

## 2024-05-21 NOTE — TELEPHONE ENCOUNTER
Mail order pharmacy seeking corrected Rx's for blood glucose testing supplies    Seeking:  Accu-Chek Guide test kit, test ONCE daily  Softclix lancets - test once daily  Softclix lancing device - test once daily    Please send new Rx's for appropriate testing frequency (once daily) and discontinue all other testing supplies in chart    RT Karol (R)

## 2024-05-21 NOTE — PROGRESS NOTES
Pt is completing a home sleep test. Pt was instructed on how to put on the Noxturnal T3 device and associated equipment before going to bed and given the opportunity to practice putting it on before leaving the sleep center. Pt was reminded to bring the home sleep test kit back to the center tomorrow, at agreed upon time for download and reporting.   Neck circumference: 42 CM / 16.5 inches.  Olivia León CMA on 5/21/2024 at 1:10 PM

## 2024-05-21 NOTE — TELEPHONE ENCOUNTER
Good day Liberty and team,      We are unable to pend the requested supplies for Dr. Churchill. Are you kellen able to help us.      Nicky ALLRED MA on 5/21/2024 at 5:46 PM

## 2024-05-22 ENCOUNTER — DOCUMENTATION ONLY (OUTPATIENT)
Dept: SLEEP MEDICINE | Facility: CLINIC | Age: 89
End: 2024-05-22
Payer: COMMERCIAL

## 2024-05-22 RX ORDER — LANCETS
EACH MISCELLANEOUS
Qty: 100 EACH | Refills: 3 | Status: SHIPPED | OUTPATIENT
Start: 2024-05-22

## 2024-05-22 RX ORDER — LANCING DEVICE/LANCETS
KIT MISCELLANEOUS
Qty: 1 EACH | Refills: 0 | Status: SHIPPED | OUTPATIENT
Start: 2024-05-22

## 2024-05-22 RX ORDER — BLOOD-GLUCOSE METER
1 EACH MISCELLANEOUS ONCE
Qty: 1 KIT | Refills: 0 | Status: SHIPPED | OUTPATIENT
Start: 2024-05-22 | End: 2024-05-22

## 2024-05-22 RX ORDER — BLOOD SUGAR DIAGNOSTIC
STRIP MISCELLANEOUS
Qty: 100 STRIP | Refills: 3 | Status: SHIPPED | OUTPATIENT
Start: 2024-05-22

## 2024-05-22 NOTE — PROGRESS NOTES
HST POST-STUDY QUESTIONNAIRE    What time did you go to bed?  12:30 am  How long do you think it took to fall asleep?  minutes  What time did you wake up to start the day?  5:00 am  Did you get up during the night at all?  Yes  If you woke up, do you remember approximately what time(s)? 2:46 am.  Did you have any difficulty with the equipment?  No  Did you us any type of treatment with this study?  None  Was the head of the bed elevated? Yes  Did you sleep in a recliner?  No  Did you stop using CPAP at least 3 days before this test?  Yes  Any other information you'd like us to know?

## 2024-05-22 NOTE — NURSING NOTE
Pt returned HST device. It was downloaded and forwarded data to the clinical specialist for scoring.   Olivia León CMA on 5/22/2024 at 11:08 AM

## 2024-05-22 NOTE — TELEPHONE ENCOUNTER
Rx's sent to pharmacy per CPA with Dr. Churchill.  Liberty Morin, PharmD, [facility]  Medication Therapy Management Provider  [phone]

## 2024-05-24 ENCOUNTER — CARE COORDINATION (OUTPATIENT)
Dept: SLEEP MEDICINE | Facility: CLINIC | Age: 89
End: 2024-05-24
Payer: COMMERCIAL

## 2024-05-24 NOTE — PROGRESS NOTES
This HSAT was performed using a Noxturnal T3 device which recorded snore, sound, movement activity, body position, nasal pressure, oronasal thermal airflow, pulse, oximetry and both chest and abdominal respiratory effort. HSAT data was restricted to the time patient states they were in bed.     HSAT was scored using 1B 4% hypopnea rule.     AHI:73.8  Snoring was reported as loud.  Time with SpO2 below 89% was 130.6 minutes.   Overall signal quality was good     Pt will follow up with sleep provider to determine appropriate therapy.     Ordering Provider, Noam Nieto MD C. Oyugi, BA, RPSGT, RST System Clinical Specialist/ 5/24/2024

## 2024-05-24 NOTE — PROGRESS NOTES
Sleep Study Follow-Up:    Date: 5/21/2024    Moira Andre was contacted today for follow-up of her home sleep study done on 5/21/24 at the [facility] for possible sleep apnea.    Analysis Time:  266.9 minutes           Respiration:   Sleep Associated Hypoxemia: sustained hypoxemia was present. Baseline oxygen saturation was 90%.  Time with saturation less than or equal to 88% was 130.6 minutes. The lowest oxygen saturation was 77%.   Snoring: Snoring was present. Vocalization suggestive of sleep talking was noted.   Respiratory events: The home study revealed a presence of 85 obstructive apneas and 8 mixed and central apneas. There were 235 hypopneas resulting in a combined apnea/hypopnea index [AHI] of 73.8 events per hour.  AHI was 72.3 per hour supine, N/A per hour prone, N/A per hour on left side, and 82.3 per hour on right side.   Pattern: Excluding events noted above, respiratory rate and pattern was Normal.        Position: Percent of time spent: supine - 86.7%, prone - 0%, on left - 0%, on right - 12.8%.        Heart Rate: By pulse oximetry normal rate was noted.         Assessment:   Severe obstructive sleep apnea.  Sleep associated hypoxemia was present.    These findings were reviewed with patient.     Past medical/surgical history, family history, social history, medications and allergies were reviewed.      Impression/Plan:    Severe obstructive sleep apnea  Sleep associated hypoxemia  Coronary artery disease  Heart failure with preserved ejection fraction    Home sleep test result was reviewed in detail.  We discussed severe obstructive sleep apnea, sleep associated hypoxemia and management.      After recent hospitalization, patient has been discharged with nocturnal oxygen.    We discussed that ideal approach will be a titration PSG for optimization of PAP therapy and assess hypoxemia.  Patient does not want to have a in lab test and would prefer to get started on CPAP therapy.  As  noted, she was previously on CPAP therapy and had reported a good response.    After informed discussion, we decided to start auto titrating CPAP therapy while continuing nocturnal oxygen.  Once she is established on therapy, we can either perform overnight oximetry or titration sleep study if indicated.    Plan:    Start auto titrating CPAP therapy with a pressure range of 5 to 15 cm H2O and continue supplemental oxygen  She will follow up with me in about 3 month(s).       Electronically signed by Dr. Noam Nieto     CC: Maryan Churchill

## 2024-05-24 NOTE — PROCEDURES
"HOME SLEEP STUDY INTERPRETATION        Patient: Moira Andre  MRN: [id number]  YOB: 1933  Study Date: 5/21/2024  PCP/Referring Provider: Maryan Churchill;   Ordering Provider: Noam Nieto MD       Indications for Home Study: Moira Andre is a 89 year old female who presents with symptoms suggestive of obstructive sleep apnea.    Estimated body mass index is 37.75 kg/m  as calculated from the following:    Height as of 4/29/24: 1.702 m (5' 7\").    Weight as of 4/29/24: 109.3 kg (241 lb).  Total score - [place]: 7 (4/29/2024  3:26 PM)        Data: A full night home sleep study was performed recording the standard physiologic parameters including body position, movement, sound, nasal pressure, thermal oral airflow, chest and abdominal movements with respiratory inductance plethysmography, and oxygen saturation by pulse oximetry. Pulse rate was estimated by oximetry recording. This study was considered adequate based on > 4 hours of quality oximetry and respiratory recording. As specified by the AASM Manual for the Scoring of Sleep and Associated events, version 2.3, Rule VIII.D 1B, 4% oxygen desaturation scoring for hypopneas is used as a standard of care on all home sleep apnea testing.        Analysis Time:  266.9 minutes        Respiration:   Sleep Associated Hypoxemia: sustained hypoxemia was present. Baseline oxygen saturation was 90%.  Time with saturation less than or equal to 88% was 130.6 minutes. The lowest oxygen saturation was 77%.   Snoring: Snoring was present. Vocalization suggestive of sleep talking was noted.   Respiratory events: The home study revealed a presence of 85 obstructive apneas and 8 mixed and central apneas. There were 235 hypopneas resulting in a combined apnea/hypopnea index [AHI] of 73.8 events per hour.  AHI was 72.3 per hour supine, N/A per hour prone, N/A per hour on left side, and 82.3 per hour on right side.   Pattern: Excluding events noted above, respiratory " rate and pattern was Normal.      Position: Percent of time spent: supine - 86.7%, prone - 0%, on left - 0%, on right - 12.8%.      Heart Rate: By pulse oximetry normal rate was noted.       Assessment:   Severe obstructive sleep apnea.  Sleep associated hypoxemia was present.    Recommendations:  Positive airway pressure therapy is recommended for treatment of severe obstructive sleep apnea.  Ideal approach would be a titration PSG for optimization of positive pressure therapy.  Alternative consideration is empirical initiation of auto titrating CPAP therapy. Recommend appropriate clinical follow-up in 1 to 3 months after starting treatment to monitor response, compliance and CPAP download data.   Consider follow up monitoring of O2 saturations in sleep on treatment to monitor hypoxemia.   Suggest optimizing sleep hygiene and avoiding sleep deprivation.        Diagnosis Code(s): Obstructive Sleep Apnea G47.33, Hypoxemia G47.36    Electronically signed by: Noam Nieto MD, May 24, 2024   Diplomate, American Board of Psychiatry and Neurology, Sleep Medicine

## 2024-05-29 ENCOUNTER — TELEPHONE (OUTPATIENT)
Dept: SLEEP MEDICINE | Facility: CLINIC | Age: 89
End: 2024-05-29
Payer: COMMERCIAL

## 2024-05-29 ENCOUNTER — PATIENT OUTREACH (OUTPATIENT)
Dept: CARE COORDINATION | Facility: CLINIC | Age: 89
End: 2024-05-29
Payer: COMMERCIAL

## 2024-05-29 NOTE — PROGRESS NOTES
Clinic Care Coordination Contact  [facility]/Voicemail    Clinical Data: Care Coordinator Outreach    Outreach Documentation Number of Outreach Attempt   4/24/2024   3:53 PM 1   5/29/2024   9:26 AM 1       Left message on patient's voicemail with call back information and requested return call.    Plan: Care Coordinator will try to reach patient again in 10 business days.    Maria L Mckeon,  [facility]  Clinic Care Coordinator  [facility]  [phone]  [email or web address]

## 2024-05-29 NOTE — Clinical Note
I added you to Kristyn.  She was hospitalized for a while and I couldn't reach her for TCM.  She lives alone and should probably move to an assisted living.  She is a super sweet lady, very talkative and energetic.

## 2024-05-29 NOTE — TELEPHONE ENCOUNTER
General Call      Reason for Call:  Patient    What are your questions or concerns:  She received a call from [facility] stating they don't take her insurance. Please fax the order over to [employer] store, order date is 05/21/24      Date of last appointment with provider: 04/29/24 Dr. Gerson Holman to leave a detailed message?: Yes at Home number on file [phone] (home)    Leslie Pink   [facility] [place]  Central Scheduler

## 2024-05-30 ENCOUNTER — VIRTUAL VISIT (OUTPATIENT)
Dept: FAMILY MEDICINE | Facility: CLINIC | Age: 89
End: 2024-05-30
Payer: COMMERCIAL

## 2024-05-30 DIAGNOSIS — E66.01 MORBID OBESITY (H): ICD-10-CM

## 2024-05-30 DIAGNOSIS — J44.1 COPD EXACERBATION (H): Primary | ICD-10-CM

## 2024-05-30 DIAGNOSIS — I25.119 CORONARY ARTERY DISEASE INVOLVING NATIVE CORONARY ARTERY OF NATIVE HEART WITH ANGINA PECTORIS (H): ICD-10-CM

## 2024-05-30 DIAGNOSIS — M54.16 LUMBAR RADICULOPATHY: ICD-10-CM

## 2024-05-30 DIAGNOSIS — I50.30 HEART FAILURE WITH PRESERVED EJECTION FRACTION, NYHA CLASS I (H): ICD-10-CM

## 2024-05-30 DIAGNOSIS — E11.21 TYPE 2 DIABETES MELLITUS WITH DIABETIC NEPHROPATHY, WITHOUT LONG-TERM CURRENT USE OF INSULIN (H): ICD-10-CM

## 2024-05-30 DIAGNOSIS — I48.91 ATRIAL FIBRILLATION WITH RVR (H): ICD-10-CM

## 2024-05-30 PROCEDURE — 99441 PR PHYSICIAN TELEPHONE EVALUATION 5-10 MIN: CPT | Mod: 93 | Performed by: INTERNAL MEDICINE

## 2024-05-30 ASSESSMENT — PATIENT HEALTH QUESTIONNAIRE - PHQ9
SUM OF ALL RESPONSES TO PHQ QUESTIONS 1-9: 5
SUM OF ALL RESPONSES TO PHQ QUESTIONS 1-9: 5
10. IF YOU CHECKED OFF ANY PROBLEMS, HOW DIFFICULT HAVE THESE PROBLEMS MADE IT FOR YOU TO DO YOUR WORK, TAKE CARE OF THINGS AT HOME, OR GET ALONG WITH OTHER PEOPLE: SOMEWHAT DIFFICULT

## 2024-05-30 NOTE — PATIENT INSTRUCTIONS
Heart Failure: Care Instructions  Overview     Heart failure occurs when your heart does not pump as much blood as the body needs. Failure does not mean that the heart has stopped pumping but rather that it is not pumping as well as it should. Over time, this causes fluid buildup in your lungs and other parts of your body. Fluid buildup can cause shortness of breath, fatigue, swollen ankles, and other problems. Heart failure is treated with medicines, a heart-healthy lifestyle, and the steps you take to check your symptoms. Treatment can slow the disease, help you feel better, and help keep you out of the hospital. Treatment may also help you live longer.  Follow-up care is a key part of your treatment and safety. Be sure to make and go to all appointments, and call your doctor if you are having problems. It's also a good idea to know your test results and keep a list of the medicines you take.  How can you care for yourself at home?  Medicines    Be safe with medicines. Take your medicines exactly as prescribed. Call your doctor if you think you are having a problem with your medicine.     You will get more details on the specific medicines your doctor prescribes. Medicines can help your heart work better, help you feel better, and help keep you out of the hospital. Medicines may also help you live longer.     Talk with your doctor or pharmacist before you take a new prescription or over-the-counter medicine. Ask if the medicine is safe for you to take. Some medicines can affect your heart and make heart failure worse. Others may keep your heart failure medicines from working right. Over-the-counter medicines that you may need to avoid include herbal supplements, vitamins, pain relievers called NSAIDs, antacids, laxatives, and cough, cold, flu, or sinus medicine.   Diet    Eat heart-healthy foods. These foods include vegetables, fruits, nuts, beans, lean meat, fish, and whole grains.     Your doctor may suggest  that you limit sodium. Your doctor can tell you how much sodium is right for you. An example is less than 3,000 mg a day. This includes all the salt you eat in cooking or in packaged foods. People get most of their sodium from processed foods. Fast food and restaurant meals also tend to be very high in sodium.     Limit your fluid intake if your doctor tells you to. Your doctor will tell you how much fluid you can have in a day.   Symptoms    Weigh yourself without clothing at the same time each day. Record your weight. Call your doctor if you have a sudden weight gain, such as more than 2 to 3 pounds in a day or 5 pounds in a week. (Your doctor may suggest a different range of weight gain.) A sudden weight gain may mean that your heart failure is getting worse.     Check your symptoms every day to watch for changes. Know what to do if your symptoms get worse.   Activity    Be active. Do not start to exercise until you have talked with your doctor. Together you can make an exercise program that is enjoyable and safe for you. Regular exercise can make your heart and your body stronger. Being active can help you feel better too.     With your doctor, plan how often, how long, and how hard you will be active. Don't exercise too hard because it can put stress on your heart.     If your doctor has not set you up with a cardiac rehabilitation (rehab) program, ask if it's right for you. Cardiac rehab can give you education and support that help you stay as healthy as possible.     When you exercise, watch for signs that your heart is working too hard. You are pushing yourself too hard if you cannot talk while you are exercising. If you become short of breath or dizzy or have chest pain, stop, sit down, and rest.   Heart-healthy lifestyle    Do not smoke. Smoking can make a heart condition worse. If you need help quitting, talk to your doctor about stop-smoking programs and medicines. These can increase your chances of  "quitting for good. Quitting smoking may be the most important step you can take to protect your heart.     Stay at a healthy weight. Lose weight if you need to.     Manage other health problems such as diabetes and high blood pressure.     Limit or avoid alcohol. Ask your doctor how much alcohol, if any, is safe for you.     If you think you may have a problem with alcohol or drug use, talk to your doctor.     Avoid infections such as COVID-19, colds, and the flu. Get the flu vaccine every year. Get a pneumococcal vaccine shot. If you have had one before, ask your doctor whether you need another dose. Stay up to date on your COVID-19 vaccines.   When should you call for help?   Call 911  if you have symptoms of sudden heart failure such as:    You have severe trouble breathing.     You cough up pink, foamy mucus.     You have a new irregular or rapid heartbeat.   Call your doctor now or seek immediate medical care if:    You have new or increased shortness of breath.     You are dizzy or lightheaded, or you feel like you may faint.     You have sudden weight gain, such as more than 2 to 3 pounds in a day or 5 pounds in a week. (Your doctor may suggest a different range of weight gain.)     You have increased swelling in your legs, ankles, or feet.     You are suddenly so tired or weak that you cannot do your usual activities.   Watch closely for changes in your health, and be sure to contact your doctor if you develop new symptoms.  Where can you learn more?  Go to https://www.[employer].net/patiented  Enter E597 in the search box to learn more about \"Heart Failure: Care Instructions.\"  Current as of: June 24, 2023               Content Version: 14.0    [id number] [employer].   Care instructions adapted under license by your healthcare professional. If you have questions about a medical condition or this instruction, always ask your healthcare professional. [employer] disclaims any " warranty or liability for your use of this information.      My Heart Failure Action Plan  Name: Moira Andre   YOB: 1933  Date: 5/30/2024   My doctor:   Maryan Churchill [place] [address] [facility], [address]  [facility] [address] [phone] My Diagnosis: HF-pEF (EF > 40%)  My Ejection Fraction:   Lab Results   Component Value Date    LVEF 55-60% 04/19/2024     Over 50%  My Exercise Goal: Start exercise slowly - to begin, do a few minutes of exercise, several times a day. Increase your time and speed [id number] to build tolerance, with a goal of 30 minutes of exercise daily. Steady, slow, and consistent exercise is both safe and healthy. Stop and rest when you feel tired or become short of breath. Do not push yourself on days when you don t feel well.       My Weight Plan:   Wt Readings from Last 2 Encounters:   04/29/24 109.3 kg (241 lb)   04/29/24 111.7 kg (246 lb 4.8 oz)     Weigh yourself daily using the same scale. If you gain more than 2 pounds in 24 hours or 5 pounds in 7 days. call the clinic    My Diet Goal: No added salt    Emergency Room Visits:    Our goal is to improve your quality of life and help you avoid a visit to the emergency room or hospital.  If we work together, we can achieve this goal. But, if you feel you need to call 911 or go to the emergency room, please do so.  If you go to the emergency room, please bring your list of medicines and your daily weight chart with you.    Each day ask yourself:  Is my weight up?  Do I have swelling?  Do I have trouble breathing?  How did I sleep?  Other problems?       GREEN ZONE     Weight gained is no more than 2 pounds a day or 5 pounds a in 7 days.  No swelling in feet, ankles, legs or stomach.  No more swelling than usual.  No more trouble breathing than usual.  No change in my sleep.  No other problems. What should I do?  I am doing fine. I will take my medicine, follow my diet, see my doctor,  exercise, and watch for symptoms.           YELLOW ZONE         Weight gain of more than 2 pounds in one day or 5 pounds in 7 days.  New swelling in ankle, leg, knee or thigh.  Bloating in belly, pants feel tighter.  Swelling in hands or face.  Coughing or trouble breathing while walking or talking.  Harder to breathe last night.  Have trouble sleeping, wake up short of breath.  Unusually tired.  Not eating.  Nausea, vomiting, or diarrhea  Pain in my chest or bad  leg cramps.  Feel weak or dizzy. What should I do?  I need to take action and call my doctor or nurse today.                 RED ZONE         Weight gain of 5 pounds overnight.  Chest pain or pressure that does not go away.  Feel less alert.  Wheezing or have trouble breathing when at rest.  Cannot sleep lying down.  Cannot take my medicines.  Pass out or faint. What should I do?  I need to call my doctor or nurse now!  Call 911 if I have chest pain or cannot breathe.

## 2024-05-30 NOTE — PROGRESS NOTES
Kristyn is a 89 year old who is being evaluated via a billable video visit.    What phone number would you like to be contacted at? [phone]  How would you like to obtain your AVS? Verbally discussed       Kristyn was seen today for follow up.    Diagnoses and all orders for this visit:    COPD exacerbation (H)  Improving   Receives home care visits every other day.  Uses oxygen 2 liters/night, has not received her CPAP yet b/c her insurance company was not in the provider's network, so she's looking for a new clinic/provider that's within her network.  She's been moving around well, she planted flowers outside this morning.  Reported fatigue w/ exertion.    Type 2 diabetes mellitus with diabetic nephropathy, without long-term current use of insulin (H)  Takes glimepiride and monitors BG at home.  Lab Results   Component Value Date    A1C 7.7 02/22/2024    A1C 7.9 10/26/2023    A1C 6.5 06/26/2023    A1C 6.9 02/24/2023    A1C 6.8 10/24/2022    A1C 6.2 08/24/2020    A1C 6.5 01/20/2020    A1C 6.1 09/20/2019    A1C 6.1 06/19/2019    A1C 6.4 03/04/2019         Heart failure with preserved ejection fraction, NYHA class I (H)  Takes torsemide  Well compensated     Coronary artery disease involving native coronary artery of native heart with angina pectoris (H24)  Follows cardiology  Currently stabl e    Atrial fibrillation with RVR (H)  Takes Eliquis and Lopressor  HR under control    Morbid obesity (H)  Has started cooking for herself instead of eating frozen dinners.    Lumbar radiculopathy  Follows pain clinic, scheduled for L5S1 injection in 1 week.       Subjective   Kristyn is a 89 year old, presenting for the following health issues:  Follow Up (Patient is having a virtual follow up visit to go over her medications and her CHF.   )    History of Present Illness       Heart Failure:  She presents for follow up of heart failure. She is experiencing shortness of breath with activity only, which is improved. She is not  experiencing any lower extremity edema.   She denies orthopenea and is not coughing at night. Patient is checking weight daily. She has recently had a None.  She has side effects from medications including slow heartbeat and other.  She has has a medical visit for heart failure 1 time since the last visit.    Hypertension: She presents for follow up of hypertension.  She does not check blood pressure  regularly outside of the clinic. Outside blood pressures have been over 140/90. She follows a low salt diet.     She eats 0-1 servings of fruits and vegetables daily.She consumes 2 sweetened beverage(s) daily.She exercises with enough effort to increase her heart rate 9 or less minutes per day.  She exercises with enough effort to increase her heart rate 3 or less days per week. She is missing 1 dose(s) of medications per week.  She is not taking prescribed medications regularly due to remembering to take.     Last Echo:   Echo result w/o MOPS: Interpretation Summary The inferior vena cava was normal in size with preserved respiratoryvariability.IVC diameter <2.1 cm collapsing >50% with sniff suggests a normal RA pressureof 3 mmHg.          Review of Systems  Constitutional, HEENT, cardiovascular, pulmonary, GI, , musculoskeletal, neuro, skin, endocrine and psych systems are negative, except as otherwise noted.      Objective       Vitals:  No vitals were obtained today due to virtual visit.    Physical Exam   GENERAL: alert and no distress  NEURO: Mentation and speech appropriate for age.  PSYCH: Appropriate affect, tone, and pace of words  Able to communicate easily       Visit Details    Type of service:  Telephone visit  Originating Location (pt. Location): Home    Distant Location (provider location):  On-site  Platform used for Visit: Telephone Total duration: 6 minutes.    Signed Electronically by: Maryan Churchill MD    This document serves as a record of the services and decisions personally performed and  made by Dr. Churchill. It was created on her behalf by Jeanette Messer, a trained medical scribe. The creation of this document is based the provider's statements to the medical scribe.

## 2024-06-03 ENCOUNTER — TELEPHONE (OUTPATIENT)
Dept: CARE COORDINATION | Facility: CLINIC | Age: 89
End: 2024-06-03
Payer: COMMERCIAL

## 2024-06-03 NOTE — TELEPHONE ENCOUNTER
Food Resource Navigator Contact      [facility]/Voicemail    Clinical Data: Food Resource Navigator Outreach    Called today to discuss potential of enrolling in Fresh Food Prescription Program. Moira was not interested as she would like to be able to pick her own groceries rather than have a box of pre-selected groceries delivered to her house.     Yane Chilel   [facility] Food Resource Navigator  Food is Medicine

## 2024-06-06 ENCOUNTER — TELEPHONE (OUTPATIENT)
Dept: FAMILY MEDICINE | Facility: CLINIC | Age: 89
End: 2024-06-06
Payer: COMMERCIAL

## 2024-06-07 NOTE — TELEPHONE ENCOUNTER
Upon chart review, sig is detailed below.         Unsure what additional information is needed for the pharmacy.     Triage: please contact the pharmacy and clarify what additional information is needed.     Thank you,  Alisha Lackey RN

## 2024-06-07 NOTE — TELEPHONE ENCOUNTER
Writer contacted pharmacy to clarify that the patient should be taking her blood sugar 1-2 times a day to allow for dispensing of blood sugar test strips. [facility].

## 2024-06-11 ENCOUNTER — OFFICE VISIT (OUTPATIENT)
Dept: PHARMACY | Facility: CLINIC | Age: 89
End: 2024-06-11
Attending: INTERNAL MEDICINE
Payer: COMMERCIAL

## 2024-06-11 VITALS
OXYGEN SATURATION: 92 % | BODY MASS INDEX: 39.94 KG/M2 | HEART RATE: 76 BPM | SYSTOLIC BLOOD PRESSURE: 123 MMHG | DIASTOLIC BLOOD PRESSURE: 84 MMHG | WEIGHT: 255 LBS

## 2024-06-11 DIAGNOSIS — J30.2 SEASONAL ALLERGIC RHINITIS, UNSPECIFIED TRIGGER: ICD-10-CM

## 2024-06-11 DIAGNOSIS — E11.9 TYPE 2 DIABETES MELLITUS WITHOUT COMPLICATION, WITHOUT LONG-TERM CURRENT USE OF INSULIN (H): Primary | ICD-10-CM

## 2024-06-11 DIAGNOSIS — I24.9 ACS (ACUTE CORONARY SYNDROME) (H): ICD-10-CM

## 2024-06-11 DIAGNOSIS — M17.9 OSTEOARTHRITIS OF KNEE, UNSPECIFIED LATERALITY, UNSPECIFIED OSTEOARTHRITIS TYPE: ICD-10-CM

## 2024-06-11 DIAGNOSIS — I48.91 ATRIAL FIBRILLATION WITH RVR (H): ICD-10-CM

## 2024-06-11 DIAGNOSIS — E78.5 HYPERLIPIDEMIA LDL GOAL <70: ICD-10-CM

## 2024-06-11 DIAGNOSIS — K21.9 GASTROESOPHAGEAL REFLUX DISEASE WITHOUT ESOPHAGITIS: ICD-10-CM

## 2024-06-11 DIAGNOSIS — I10 ESSENTIAL HYPERTENSION: ICD-10-CM

## 2024-06-11 DIAGNOSIS — J44.9 CHRONIC OBSTRUCTIVE PULMONARY DISEASE, UNSPECIFIED COPD TYPE (H): ICD-10-CM

## 2024-06-11 DIAGNOSIS — I50.9 ACUTE ON CHRONIC CONGESTIVE HEART FAILURE, UNSPECIFIED HEART FAILURE TYPE (H): ICD-10-CM

## 2024-06-11 DIAGNOSIS — Z78.9 TAKES DIETARY SUPPLEMENTS: ICD-10-CM

## 2024-06-11 DIAGNOSIS — R21 RASH: ICD-10-CM

## 2024-06-11 DIAGNOSIS — F32.1 MODERATE MAJOR DEPRESSION (H): ICD-10-CM

## 2024-06-11 DIAGNOSIS — N18.32 STAGE 3B CHRONIC KIDNEY DISEASE (H): ICD-10-CM

## 2024-06-11 PROCEDURE — 99207 PR NO CHARGE LOS: CPT | Performed by: PHARMACIST

## 2024-06-11 RX ORDER — TRIAMCINOLONE ACETONIDE 1 MG/G
CREAM TOPICAL 2 TIMES DAILY PRN
COMMUNITY

## 2024-06-11 RX ORDER — MICONAZOLE NITRATE 20 MG/G
CREAM TOPICAL 2 TIMES DAILY PRN
COMMUNITY

## 2024-06-11 NOTE — PROGRESS NOTES
Medication Therapy Management (MTM) Encounter    ASSESSMENT:                            Medication Adherence/Access: {adherencechoices:279398}    ***:   ***    PLAN:                            Increase your fluticasone/salmeterol inhaler to take 1 puff twice daily.  This may help more with your breathing.  Double check that you've got your pill boxes set up correctly based on updated schedule I provided you with you today.    Follow-up: {followuptest2:379668}    SUBJECTIVE/OBJECTIVE:                          Kristyn Andre is a 89 year old female coming in for a follow-up visit.       Reason for visit: Routine follow-up.    Tobacco: She reports that she quit smoking about 51 years ago. Her smoking use included cigarettes. She started smoking about 52 years ago. She has a 0.3 pack-year smoking history. She has never used smokeless tobacco.  Alcohol: not currently using    Medication Adherence/Access: no issues reported, although acknowledges she's not 100% sure she's been taking everything correctly.    Diabetes   Glimepiride 2mg daily (prescribed for twice daily)  {ASAyesno:038672}  {sideeffects:166822}  Current diabetes symptoms: {Diabetes Symptoms:786488}  Blood sugar monitoring: {MTM self monitorin}  Diet/Exercise: ***   Eye exam in the last 12 months? { :918261}  Foot exam: {up to date:637023}  Lab Results   Component Value Date    A1C 7.7 2024    A1C 7.9 10/26/2023    A1C 6.5 2023     CKD Stage 3:  ***  Lab Results   Component Value Date    CR 1.50 (H) 2024    CR 1.64 (H) 2024    CR 2.02 (H) 2024    CR 2.16 (H) 2024    GFRESTIMATED 33 (L) 2024    GFRESTIMATED 29 (L) 2024    GFRESTIMATED 23 (L) 2024    GFRESTIMATED 21 (L) 2024       Hypertension /CAD/A. Fib/HFpEF:  Eliquis 2.5mg twice daily (reduced dose due to elevated SrCr and age)  Metoprolol tartrate 75mg twice daily   Sublingual nitroglycerin as needed - using ***  Torsemide 40mg  daily  Cannot afford SGLT2i or Entresto  Patient reports {side effects:192376}  Patient {HTNDoes/doesnot:280963}.    BP Readings from Last 3 Encounters:   04/29/24 97/67   04/29/24 126/86   04/22/24 (!) 159/94      Hyperlipidemia   Atorvastatin 20mg daily  Patient reports {mtmlipidsideeffect:313642}  {lipidascvd:650165}    Recent Labs   Lab Test 06/26/23  1421 10/24/22  1245   CHOL 140 145   HDL 45* 44*   LDL 57 52   TRIG 189* 246*      GERD    Tums 500mg twice daily as needed - has used once since hospital discharge  Panroprazole 20mg daily  {gerd2:323210}     Mental Health   Depression  Bupropion SR 100mg once daily  Duloxetine 60mg daily  Patient reports { :004579}.  {mtmdepassess:338623}      Allergic Rhinitis:   Fexofenadine *** mg {FREQUENCY5:450770}  Patient reports {:687550}.    {allergy:496287}   Patient feels that current therapy {IS NOT/IS:027874} effective.      COPD:   Albuterol MDI 2 puffs four times daily as needed - no recent use  Wixela 100/50mcg 1 puff daily (prescribed for twice daily)  {Steroid inhaler -- Delete if patient does not use steroid:337315}    Patient reports {:089853}.    Patient reports the following symptoms: {COPD SYMPTOMS:207405}.  {COPDoptionsMTM:380259}     Pain:  Acetaminophen 1000mg twice daily   Icy Hot as needed - using ***  She reports pain is ***    Supplements   Hair/skin/nails daily  Vitamin D 1000 international unit(s) daily  {supplement:968478}      Today's Vitals: There were no vitals taken for this visit.  ----------------  {ELICEO?:114001}    I spent 40 minutes with this patient today{MTMpartdbillingquestion:650938}. { :164311}. A copy of the visit note was provided to the patient's provider(s).    A summary of these recommendations {GIVEN/NOT GIVEN:988729}.    Liberty Morin, PharmD, BCACP  Medication Therapy Management Provider  [phone]      Medication Therapy Recommendations  No medication therapy recommendations to display

## 2024-06-11 NOTE — PATIENT INSTRUCTIONS
"Recommendations from today's MTM visit:                                                    MTM (medication therapy management) is a service provided by a clinical pharmacist designed to help you get the most of out of your medicines.   Today we reviewed what your medicines are for, how to know if they are working, that your medicines are safe and how to make your medicine regimen as easy as possible.      I would recommend we reduce the dose of Allegra (fexofenadine) to 60mg daily.  This is the correct dose for your kidney function.  Increase your fluticasone/salmeterol inhaler to take 1 puff twice daily.  This may help more with your breathing.  Double check that you've got your pill boxes set up correctly based on updated schedule I provided you with you today.    Follow-up: Return in about 6 months (around 12/11/2024) for Follow-up Medication Review.    It was great speaking with you today.  I value your experience and would be very thankful for your time in providing feedback in our clinic survey. In the next few days, you may receive an email or text message from [employer] with a link to a survey related to your  clinical pharmacist.\"     To schedule another MTM appointment, please call the clinic directly or you may call the MTM scheduling line at [phone] or toll-free at [phone].     My Clinical Pharmacist's contact information:                                                      Please feel free to contact me with any questions or concerns you have.      Liberty Morin, PharmD, [facility]  Medication Therapy Management Provider  [phone]      Medication List      Accurate as of June 11, 2024 11:29 AM. If you have any questions, ask your nurse or doctor.          Morning Afternoon Evening Bedtime   acetaminophen 500 MG tablet  Also known as: TYLENOL  Take 1,000 mg by mouth 2 times daily   X         X   albuterol 108 (90 Base) MCG/ACT inhaler  Also known as: PROAIR HFA/PROVENTIL HFA/VENTOLIN " HFA  Inhale 2 puffs into the lungs every 6 hours as needed for shortness of breath, wheezing or cough For shortness of breath       As needed         apixaban ANTICOAGULANT 2.5 MG tablet  Also known as: ELIQUIS  Take 1 tablet (2.5 mg) by mouth 2 times daily  Reason for med: Atrial Fibrillation Not Caused By A Heart Valve Problem   X         X   atorvastatin 20 MG tablet  Also known as: LIPITOR  Take 1 tablet (20 mg) by mouth daily for cholesterol            X   buPROPion 100 MG 12 hr tablet  Also known as: WELLBUTRIN SR  Take 1 tablet (100 mg) by mouth daily for mood            X   calcium carbonate 500 MG chewable tablet  Also known as: TUMS  Take 1 chew tab by mouth 2 times daily as needed for heartburn     As needed       DULoxetine 60 MG capsule  Also known as: CYMBALTA  Take 1 capsule (60 mg) by mouth daily   X            fexofenadine 60 MG tablet  Also known as: ALLEGRA  Take 60 mg by mouth every evening         X      fluticasone-salmeterol 100-50 MCG/ACT inhaler  Also known as: Wixela Inhub  USE 1 INHALATION BY MOUTH EVERY  12 HOURS   X         X   glimepiride 2 MG tablet  Also known as: AMARYL  Take 1 tablet (2 mg) by mouth 2 times daily (before meals)  Doctor's comments: Dose is increased   X      X      HAIR SKIN AND NAILS FORMULA PO  Take 1 tablet by mouth daily X              ICY HOT MEDICATED SPRAY EX  Externally apply topically daily as needed (pain, in neck, back, hand)   As needed   metoprolol tartrate 25 MG tablet  Also known as: LOPRESSOR  Take 3 tablets (75 mg) by mouth 2 times daily  Doctor's comments: Dose is changed   X         X   miconazole 2 % external cream  Also known as: MICATIN  Apply topically 2 times daily as needed for other (rash/irritation)   As needed       nitroGLYcerin 0.4 MG sublingual tablet  Also known as: NITROSTAT  FOR CHEST PAIN PLACE 1 TABLET  UNDER TONGUE EVERY 5 MINUTES FOR 3 DOSES. IF SYMPTOMS PERSIST 5  MINUTES AFTER 1ST DOSE CALL 911   As needed         pantoprazole  20 MG EC tablet  Also known as: PROTONIX  Take 1 tablet (20 mg) by mouth daily   X            torsemide 20 MG tablet  Also known as: DEMADEX  Take 2 tablets (40 mg) by mouth daily  Doctor's comments: Please inform the patient that due to insurance reason the this  was changed from 40 mg to 20 mg 2 tablets   X            triamcinolone 0.1 % external cream  Also known as: KENALOG  Apply topically 2 times daily as needed for irritation   As needed       Vitamin D 125 MCG (5000 UT) capsule  Take 1 tablet by mouth daily   X

## 2024-06-12 ENCOUNTER — PATIENT OUTREACH (OUTPATIENT)
Dept: CARE COORDINATION | Facility: CLINIC | Age: 89
End: 2024-06-12
Payer: COMMERCIAL

## 2024-06-12 NOTE — PROGRESS NOTES
Medication Therapy Management (MTM) Encounter    ASSESSMENT:                            Medication Adherence/Access: Unclear if she's taking medications as prescribed.  She has a good understanding of her medications and what they're for, it seems that she possibly mis-understood highlighting on discharge summary.  May benefit from updated 'fridge list' to use in setting up pill boxes.    Diabetes:   Patient is meeting A1c goal of < 8% due to advanced age.  Self monitoring of blood glucose is not at goal of fasting  mg/dL per her report, but may be due to taking glimepiride once daily vs twice daily as prescribed.    Hypertension/CAD/A. Fib/HFpEF/CKD Stage 3:   Patient is not quite meeting blood pressure goal of < 130/80mmHg, historically has been meeting goal.  No medication changes needed at this time, will continue to monitor as she may have not been taking torsemide as prescribed.    Hyperlipidemia:   Stable.  LDL is near goal <55mg/dL (last check 57mg/dL) per ADA guidelines given history of CAD.      GERD:   Stable.     Depression  Stable.     Allergic rhinitis:   Patient would benefit from reducing fexofenadine to 60mg daily due to reduced renal function.    COPD:   Patient may benefit from taking Wixela twice daily as prescribed.    Pain:  Stable.    Rash:  Stable.    Supplements:  Stable.      PLAN:                            Increase your fluticasone/salmeterol inhaler to take 1 puff twice daily.  This may help more with your breathing.  Double check that you've got your pill boxes set up correctly based on updated schedule I provided you with you today [see AVS].  Recommended reducing fexofenadine to 60mg daily based on renal function.    Follow-up: Return in about 6 months (around 12/11/2024) for Follow-up Medication Review.    SUBJECTIVE/OBJECTIVE:                          Kristyn Andre is a 89 year old female coming in for a follow-up visit.       Reason for visit: Routine follow-up.    Tobacco:  She reports that she quit smoking about 51 years ago. Her smoking use included cigarettes. She started smoking about 52 years ago. She has a 0.3 pack-year smoking history. She has never used smokeless tobacco.  Alcohol: not currently using    Medication Adherence/Access: no issues reported, although acknowledges she's not 100% sure she's been taking everything correctly as we review her medications together.  She brings a summary of her medications from her last discharge summary in April, some doses are highlighted [it appears which doses she needed to take the evening of discharge] and she knows she's been taking those, but some of those medications are for twice daily administration and after our discussion she's not sure if she's been setting those up correctly in her pill box.  She also brings all of her pill vials to our visit, did not bring pill box.    Diabetes   Glimepiride 2mg twice daily (as above, unsure if she's been taking once daily or twice daily)  Not taking aspirin due to Eliquis use  Previously didn't tolerate Mounjaro - diarrhea/GI upset.  Patient is not experiencing side effects.  Current diabetes symptoms: none  Blood sugar monitoring: Once time(s) daily; Ranges: (patient reported) Fasting - higher than what she's used to seeing, has been around 180mg/dL recently  Diet/Exercise: Has been focusing on eating better, more fruits/veggies and less processed meals   Eye exam in the last 12 months? No  Foot exam: due      Hypertension /CAD/A. Fib/HFpEF/CKD Stage 3:  Eliquis 2.5mg twice daily (reduced dose due to elevated SrCr and age)  Metoprolol tartrate 75mg twice daily   Sublingual nitroglycerin as needed - has not needed to use  Torsemide 40mg daily (is unsure if she's been taking consistently)  Cannot afford SGLT2i or Entresto  Patient reports no current medication side effects  Patient does not self-monitor blood pressure.      Hyperlipidemia   Atorvastatin 20mg daily  Patient reports no  significant myalgias or other side effects.       GERD    Tums 500mg twice daily as needed - has used once since hospital discharge  Panroprazole 20mg daily  Patient reports no current symptoms.   Patient feels that current regimen is effective.     Mental Health   Depression  Bupropion SR 100mg once daily  Duloxetine 60mg daily  Patient reports no current medication side effects.  Patient reports symptoms are stable.   She denies suicidal ideation.      Allergic Rhinitis:   Fexofenadine 180 mg once daily  Patient reports no current medication side effects.     Patient feels that current therapy is effective.      COPD:   Albuterol MDI 2 puffs four times daily as needed - no recent use  Wixela 100/50mcg 1 puff daily (prescribed for twice daily)  Patient rinses their mouth after using steroid inhaler.    Patient reports no current medication side effects.    Patient reports the following symptoms: stable shortness of breath upon exertion      Pain:  Acetaminophen 1000mg twice daily   Icy Hot as needed - using regularly and finds effective  She reports pain is stable.  She denies side effects of therapy.    Rash:  Miconazole 2% cream twice daily as needed  Triamcinolone 0.1% cream twice daily as needed  Uses both as needed for rash under her breasts, worse during the summer months.    She finds these effective, denies side effects.    Supplements   Hair/skin/nails daily  Vitamin D 5000 international unit(s) daily  No reported issues at this time.        Today's Vitals: /84   Pulse 76   Wt 255 lb (115.7 kg)   SpO2 92%   BMI 39.94 kg/m    ----------------    I spent 40 minutes with this patient today. A copy of the visit note was provided to the patient's provider(s).    A summary of these recommendations was given to the patient.    Liberty Morin, PharmD, [facility]  Medication Therapy Management Provider  [phone]      Medication Therapy Recommendations  Chronic obstructive pulmonary disease, unspecified COPD  type (H)    Current Medication: fluticasone-salmeterol (WIXELA INHUB) 100-50 MCG/ACT inhaler   Rationale: Dose too low - Dosage too low - Effectiveness   Recommendation: Increase Frequency   Status: Patient Agreed - Adherence/Education         Seasonal allergic rhinitis, unspecified trigger    Current Medication: fexofenadine (ALLEGRA) 60 MG tablet   Rationale: Dose too high - Dosage too high - Safety   Recommendation: Decrease Dose   Status: Accepted - no CPA Needed         Type 2 diabetes mellitus with diabetic nephropathy (H)    Current Medication: glimepiride (AMARYL) 2 MG tablet   Rationale: Incorrect administration - Dosage too low - Effectiveness   Recommendation: Provide Adherence Intervention   Status: Patient Agreed - Adherence/Education

## 2024-06-12 NOTE — PROGRESS NOTES
Clinic Care Coordination Contact  [facility]/Voicemail    Left message on patient's voicemail with call back information and requested return call.    Plan: Care Coordinator will try to reach patient again in 10 business days.    BEATRIZ Velazquez  Clinic Care Coordination  [facility]: [facility]  Phone: [phone]

## 2024-06-14 ENCOUNTER — TELEPHONE (OUTPATIENT)
Dept: FAMILY MEDICINE | Facility: CLINIC | Age: 89
End: 2024-06-14
Payer: COMMERCIAL

## 2024-06-14 NOTE — TELEPHONE ENCOUNTER
Home Care is calling regarding an established patient with [facility].        10/18/2023    11:57 AM   Home Care Information   Date of Home Care episode start 10/18/2023   Current following provider Dr. Churchill     Requesting orders from: Maryan Churchill  Provider is following patient: Yes  Is this a 60-day recertification request?  No    Orders Requested    Social Work  Request for continuation of care with increase in frequency  Frequency:  one visit       Verbal orders given.  Home Care will send orders for provider to sign.  Confirmed ok to leave a detailed message with call back.  Contact information confirmed and updated as needed.    Lucia Hood RN

## 2024-06-18 ENCOUNTER — TELEPHONE (OUTPATIENT)
Dept: FAMILY MEDICINE | Facility: CLINIC | Age: 89
End: 2024-06-18
Payer: COMMERCIAL

## 2024-06-18 NOTE — TELEPHONE ENCOUNTER
Home Care is calling regarding an established patient with  [facility].        10/18/2023    11:57 AM   Home Care Information   Date of Home Care episode start 10/18/2023   Current following provider Dr. Churchill     Requesting orders from: Maryan Churchill  Provider is following patient: Yes  Is this a 60-day recertification request?  Yes    Orders Requested    Skilled Nursing  Request for recertification   Frequency: once every two weeks for 8 weeks for diabetic education and COPD management.     Information was gathered and will be sent to provider for review.  RN will contact Home Care with information after provider review.  Confirmed ok to leave a detailed message with call back.  Contact information confirmed and updated as needed.    Joshua Garcia RN

## 2024-06-19 NOTE — TELEPHONE ENCOUNTER
Called and left a message for a call back.  [facility] cannot set up patient because she is Medicare and did an HST.  Patient needs to find out from her insurance who she can use.

## 2024-06-20 ENCOUNTER — TELEPHONE (OUTPATIENT)
Dept: FAMILY MEDICINE | Facility: CLINIC | Age: 89
End: 2024-06-20
Payer: COMMERCIAL

## 2024-06-20 NOTE — TELEPHONE ENCOUNTER
Home Care is calling regarding an established patient with  [facility].        10/18/2023    11:57 AM   Home Care Information   Date of Home Care episode start 10/18/2023   Current following provider Dr. Churchill     Requesting orders from: Maryan Churchill  Provider is following patient: Yes  Is this a 60-day recertification request?  No    Orders Requested    Physical Therapy  Request for initial certification (first set of orders)   Frequency:  1X wk fo 1wk then 1X every other week for 8 weeks        Information was gathered and will be sent to provider for review.  RN will contact Home Care with information after provider review.  Confirmed ok to leave a detailed message with call back.  Contact information confirmed and updated as needed.    Wilda Cohn RN

## 2024-06-20 NOTE — TELEPHONE ENCOUNTER
Home Care is calling regarding an established patient with [facility].  {       10/18/2023    11:57 AM   Home Care Information   Date of Home Care episode start 10/18/2023   Current following provider Dr. Churchill     Requesting orders from: Maryan Churchill  Provider is following patient: Yes  Is this a 60-day recertification request?  No    Orders Requested    Occupational Therapy  Request for continuation of care with no increase or decrease in frequency  Frequency: 1 OT visit every other week for 8 wks     Verbal orders given.  Home Care will send orders for provider to sign.  Verbal orders given, provider out of office, covering provider used.  Home Care will send orders for covering provider to sign.    Joe Banda RN

## 2024-06-23 ENCOUNTER — MEDICAL CORRESPONDENCE (OUTPATIENT)
Dept: HEALTH INFORMATION MANAGEMENT | Facility: CLINIC | Age: 89
End: 2024-06-23

## 2024-06-27 ENCOUNTER — OFFICE VISIT (OUTPATIENT)
Dept: FAMILY MEDICINE | Facility: CLINIC | Age: 89
End: 2024-06-27
Payer: COMMERCIAL

## 2024-06-27 VITALS
OXYGEN SATURATION: 97 % | WEIGHT: 255 LBS | BODY MASS INDEX: 39.94 KG/M2 | HEART RATE: 137 BPM | TEMPERATURE: 98.4 F | SYSTOLIC BLOOD PRESSURE: 97 MMHG | RESPIRATION RATE: 20 BRPM | DIASTOLIC BLOOD PRESSURE: 66 MMHG

## 2024-06-27 DIAGNOSIS — F32.1 MODERATE MAJOR DEPRESSION (H): ICD-10-CM

## 2024-06-27 DIAGNOSIS — M54.16 LUMBAR RADICULOPATHY: ICD-10-CM

## 2024-06-27 DIAGNOSIS — E66.01 MORBID OBESITY (H): ICD-10-CM

## 2024-06-27 DIAGNOSIS — I25.10 CORONARY ARTERY CALCIFICATION SEEN ON CAT SCAN: ICD-10-CM

## 2024-06-27 DIAGNOSIS — J44.9 CHRONIC OBSTRUCTIVE PULMONARY DISEASE, UNSPECIFIED COPD TYPE (H): ICD-10-CM

## 2024-06-27 DIAGNOSIS — I50.30 HEART FAILURE WITH PRESERVED EJECTION FRACTION, NYHA CLASS I (H): Primary | ICD-10-CM

## 2024-06-27 DIAGNOSIS — E11.21 TYPE 2 DIABETES MELLITUS WITH DIABETIC NEPHROPATHY, WITHOUT LONG-TERM CURRENT USE OF INSULIN (H): ICD-10-CM

## 2024-06-27 DIAGNOSIS — N18.32 STAGE 3B CHRONIC KIDNEY DISEASE (H): ICD-10-CM

## 2024-06-27 DIAGNOSIS — E66.2 OBESITY HYPOVENTILATION SYNDROME (H): ICD-10-CM

## 2024-06-27 DIAGNOSIS — I48.91 ATRIAL FIBRILLATION WITH RVR (H): ICD-10-CM

## 2024-06-27 DIAGNOSIS — G47.33 OSA (OBSTRUCTIVE SLEEP APNEA): ICD-10-CM

## 2024-06-27 LAB
ALBUMIN SERPL BCG-MCNC: 4 G/DL (ref 3.5–5.2)
ANION GAP SERPL CALCULATED.3IONS-SCNC: 13 MMOL/L (ref 7–15)
BUN SERPL-MCNC: 28.1 MG/DL (ref 8–23)
CALCIUM SERPL-MCNC: 9.6 MG/DL (ref 8.2–9.6)
CHLORIDE SERPL-SCNC: 103 MMOL/L (ref 98–107)
CHOLEST SERPL-MCNC: 190 MG/DL
CREAT SERPL-MCNC: 1.5 MG/DL (ref 0.51–0.95)
DEPRECATED HCO3 PLAS-SCNC: 23 MMOL/L (ref 22–29)
EGFRCR SERPLBLD CKD-EPI 2021: 33 ML/MIN/1.73M2
FASTING STATUS PATIENT QL REPORTED: NO
FERRITIN SERPL-MCNC: 201 NG/ML (ref 11–328)
GLUCOSE SERPL-MCNC: 147 MG/DL (ref 70–99)
HBA1C MFR BLD: 7.2 % (ref 0–5.6)
HDLC SERPL-MCNC: 48 MG/DL
LDLC SERPL CALC-MCNC: 100 MG/DL
NONHDLC SERPL-MCNC: 142 MG/DL
PHOSPHATE SERPL-MCNC: 3.1 MG/DL (ref 2.5–4.5)
POTASSIUM SERPL-SCNC: 5 MMOL/L (ref 3.4–5.3)
PTH-INTACT SERPL-MCNC: 47 PG/ML (ref 15–65)
SODIUM SERPL-SCNC: 139 MMOL/L (ref 135–145)
TRIGL SERPL-MCNC: 208 MG/DL

## 2024-06-27 PROCEDURE — 80061 LIPID PANEL: CPT | Performed by: INTERNAL MEDICINE

## 2024-06-27 PROCEDURE — 99214 OFFICE O/P EST MOD 30 MIN: CPT | Performed by: INTERNAL MEDICINE

## 2024-06-27 PROCEDURE — 82728 ASSAY OF FERRITIN: CPT | Performed by: INTERNAL MEDICINE

## 2024-06-27 PROCEDURE — 80069 RENAL FUNCTION PANEL: CPT | Performed by: INTERNAL MEDICINE

## 2024-06-27 PROCEDURE — 83970 ASSAY OF PARATHORMONE: CPT | Performed by: INTERNAL MEDICINE

## 2024-06-27 PROCEDURE — 83036 HEMOGLOBIN GLYCOSYLATED A1C: CPT | Performed by: INTERNAL MEDICINE

## 2024-06-27 PROCEDURE — 36415 COLL VENOUS BLD VENIPUNCTURE: CPT | Performed by: INTERNAL MEDICINE

## 2024-06-27 ASSESSMENT — PAIN SCALES - GENERAL: PAINLEVEL: NO PAIN (0)

## 2024-06-27 NOTE — PATIENT INSTRUCTIONS
Labs   You are due for annual eye exam  Follow up in 4 months.  Seek sooner medical attention if there is any worsening of symptoms or problems.   
PHYSICAL EXAM:  Vital Signs Last 24 Hrs  T(C): 37.1 (30 Dec 2017 17:53), Max: 37.1 (30 Dec 2017 17:53)  T(F): 98.7 (30 Dec 2017 17:53), Max: 98.7 (30 Dec 2017 17:53)  HR: 80 (30 Dec 2017 20:30) (80 - 90)  BP: 110/75 (30 Dec 2017 20:30) (102/87 - 110/75)  BP(mean): --  RR: 14 (30 Dec 2017 20:30) (14 - 17)  SpO2: 97% (30 Dec 2017 20:30) (97% - 99%)    GENERAL:     middle aged women with flat affect in NAD  HEAD:     atraumatic, normocephalic  EYES:     EOMI, conjunctiva and sclera clear  ENMT:     +poor dentition, +gingival disease  NECK:     supple, no JVD  RESPIRATORY:     clear to auscultation bilaterally, no rales or rhonchi or wheezing or rubs  CARDIOVASCULAR:     regular rate and rhythm, no murmurs or rubs or gallops, 2+ peripheral pulses  GASTROINTESTINALI:     soft, nontender, nondistended, no hepatosplenomegaly palpated, bowel sounds present  EXTREMITIES:     no clubbing or cyanosis or edema  MUSCULOSKELETAL:     no joint pain or swelling or deformities  NERVOUS SYSTEM:     decreased sensation of right arm, but otherwise no other focal deficits noted   SKIN:     no rashes or lesions  PSYCH:     flat and withdrawn

## 2024-06-27 NOTE — LETTER
July 1, 2024      Kristyn Andre  [address]  [place] [address]        Dear ,    We are writing to inform you of your test results.          Mert Pizarro,         This is to inform you regarding your test result.         Your total cholesterol is normal.   HDL which is called good cholesterol is low.   Your LDL cholesterol is normal.  This is often call bad cholesterol and high levels increase the risk for heart attacks and strokes.   Eat low cholesterol low fat  diet and do regular physical activity. Avoid high sugar containing food.   Chronic kidney disease is stable   HbA1c which is average glucose during last 3 months is 7.2%.   Ferritin which is iron stores in the body is normal.   Parathyroid hormone level is normal.         Resulted Orders   Lipid panel reflex to direct LDL Non-fasting   Result Value Ref Range    Cholesterol 190 <200 mg/dL    Triglycerides 208 (H) <150 mg/dL    Direct Measure HDL 48 (L) >=50 mg/dL    LDL Cholesterol Calculated 100 <=100 mg/dL    Non HDL Cholesterol 142 (H) <130 mg/dL    Patient Fasting > 8hrs? No     Narrative    Cholesterol  Desirable:  <200 mg/dL    Triglycerides  Normal:  Less than 150 mg/dL  Borderline High:  150-199 mg/dL  High:  200-499 mg/dL  Very High:  Greater than or equal to 500 mg/dL    Direct Measure HDL  Female:  Greater than or equal to 50 mg/dL   Male:  Greater than or equal to 40 mg/dL    LDL Cholesterol  Desirable:  <100mg/dL  Above Desirable:  100-129 mg/dL   Borderline High:  130-159 mg/dL   High:  160-189 mg/dL   Very High:  >= 190 mg/dL    Non HDL Cholesterol  Desirable:  130 mg/dL  Above Desirable:  130-159 mg/dL  Borderline High:  160-189 mg/dL  High:  190-219 mg/dL  Very High:  Greater than or equal to 220 mg/dL   Hemoglobin A1c   Result Value Ref Range    Hemoglobin A1C 7.2 (H) 0.0 - 5.6 %      Comment:      Normal <5.7%   Prediabetes 5.7-6.4%    Diabetes 6.5% or higher     Note: Adopted from ADA consensus guidelines.    Renal panel (Alb, BUN, Ca, Cl, CO2, Creat, Gluc, Phos, K, Na)   Result Value Ref Range    Sodium 139 135 - 145 mmol/L    Potassium 5.0 3.4 - 5.3 mmol/L    Chloride 103 98 - 107 mmol/L    Carbon Dioxide (CO2) 23 22 - 29 mmol/L    Anion Gap 13 7 - 15 mmol/L    Glucose 147 (H) 70 - 99 mg/dL    Urea Nitrogen 28.1 (H) 8.0 - 23.0 mg/dL    Creatinine 1.50 (H) 0.51 - 0.95 mg/dL    GFR Estimate 33 (L) >60 mL/min/1.73m2      Comment:      eGFR calculated using 2021 CKD-EPI equation.    Calcium 9.6 8.2 - 9.6 mg/dL    Albumin 4.0 3.5 - 5.2 g/dL    Phosphorus 3.1 2.5 - 4.5 mg/dL   Parathyroid Hormone Intact   Result Value Ref Range    Parathyroid Hormone Intact 47 15 - 65 pg/mL    Narrative    This result was obtained with the Roche Elecsys PTH STAT assay.   This reference range differs from PTH assays used in other [facility] laboratories.   Ferritin   Result Value Ref Range    Ferritin 201 11 - 328 ng/mL       If you have any questions or concerns, please call the clinic at the number listed above.       Sincerely,       Dr.Nasima Kerline MD,FACP

## 2024-06-27 NOTE — PROGRESS NOTES
"  Assessment & Plan     Kristyn was seen today for follow up.    Diagnoses and all orders for this visit:  She came late for the appointment and I saw her during my lunch hour  She was very appreciative    Heart failure with preserved ejection fraction, NYHA class I (H)  Well compensated   Finally improving and feels better     Chronic obstructive pulmonary disease, unspecified COPD type (H)  Stable     Coronary artery calcification seen on CAT scan  -     Lipid panel reflex to direct LDL Non-fasting; Future  -     Lipid panel reflex to direct LDL Non-fasting  On statin    Type 2 diabetes mellitus with diabetic nephropathy, without long-term current use of insulin (H)  -     Hemoglobin A1c; Future  -     Hemoglobin A1c    Atrial fibrillation with RVR (H)  HR under control     Lumbar radiculopathy  After [place]  her pain is improved and she is able to move around and do little bit of house work    Morbid obesity (H)  She is working on it     ELIGIO (obstructive sleep apnea)  She uses oxygen   Will get CPAP machine today    Obesity hypoventilation syndrome (H)  Discussed the importance  of losing weight  Could not take mounjaro    Moderate major depression (H)  Doing well with duloxetin    Stage 3b chronic kidney disease (H)  -     Renal panel (Alb, BUN, Ca, Cl, CO2, Creat, Gluc, Phos, K, Na); Future  -     Parathyroid Hormone Intact; Future  -     Ferritin; Future  -     Renal panel (Alb, BUN, Ca, Cl, CO2, Creat, Gluc, Phos, K, Na)  -     Parathyroid Hormone Intact  -     Ferritin        Feels good that she can move around and able to do some house work          BMI  Estimated body mass index is 39.94 kg/m  as calculated from the following:    Height as of 4/29/24: 1.702 m (5' 7\").    Weight as of this encounter: 115.7 kg (255 lb).         See Patient Instructions  Patient Instructions   Labs   You are due for annual eye exam  Follow up in 4 months.  Seek sooner medical attention if there is any worsening of symptoms or " problems.     Subjective   Krsityn is a 89 year old, presenting for the following health issues:  Follow Up    HPI     Feels better   Pain in her back improved with steroid injection  Depression is fine   Feeling so much better as pain improved   Nurses come to her home to check on her   She has bed side commode           Review of Systems  Constitutional, HEENT, cardiovascular, pulmonary, GI, , musculoskeletal, neuro, skin, endocrine and psych systems are negative, except as otherwise noted.      Objective    BP 97/66 (BP Location: Right arm, Patient Position: Sitting, Cuff Size: Adult Large)   Pulse (!) 137   Temp 98.4  F (36.9  C) (Temporal)   Resp 20   Wt 115.7 kg (255 lb)   SpO2 97%   BMI 39.94 kg/m    Body mass index is 39.94 kg/m .  Physical Exam   She is very nice and pleasant.  She is comfortable and not in any kind of distress.  She is fully alert awake oriented.      Disclaimer: This note consists of symbols derived from keyboarding, dictation and/or voice recognition software. As a result, there may be errors in the script that have gone undetected. Please consider this when interpreting information found in this chart.      Signed Electronically by: Maryan Churchill MD

## 2024-07-01 ENCOUNTER — OFFICE VISIT (OUTPATIENT)
Dept: CARDIOLOGY | Facility: CLINIC | Age: 89
End: 2024-07-01
Payer: COMMERCIAL

## 2024-07-01 VITALS
OXYGEN SATURATION: 93 % | SYSTOLIC BLOOD PRESSURE: 117 MMHG | BODY MASS INDEX: 40.71 KG/M2 | HEART RATE: 55 BPM | HEIGHT: 67 IN | WEIGHT: 259.4 LBS | DIASTOLIC BLOOD PRESSURE: 76 MMHG

## 2024-07-01 DIAGNOSIS — I50.32 CHRONIC DIASTOLIC CONGESTIVE HEART FAILURE (H): ICD-10-CM

## 2024-07-01 DIAGNOSIS — I48.0 PAROXYSMAL ATRIAL FIBRILLATION (H): Primary | ICD-10-CM

## 2024-07-01 DIAGNOSIS — G47.33 OBSTRUCTIVE SLEEP APNEA SYNDROME: ICD-10-CM

## 2024-07-01 DIAGNOSIS — I48.91 ATRIAL FIBRILLATION WITH RVR (H): ICD-10-CM

## 2024-07-01 DIAGNOSIS — E78.5 HYPERLIPIDEMIA LDL GOAL <70: ICD-10-CM

## 2024-07-01 DIAGNOSIS — I35.0 NONRHEUMATIC AORTIC VALVE STENOSIS: ICD-10-CM

## 2024-07-01 PROCEDURE — 93000 ELECTROCARDIOGRAM COMPLETE: CPT | Performed by: NURSE PRACTITIONER

## 2024-07-01 PROCEDURE — 99214 OFFICE O/P EST MOD 30 MIN: CPT | Performed by: NURSE PRACTITIONER

## 2024-07-01 NOTE — LETTER
7/1/2024    Maryan Churchill MD  [address]  [facility] [address]    RE: Moira Andre       Dear Colleague,     I had the pleasure of seeing Moira Andre in the [facility].  Cardiology Clinic Progress Note  Moira Andre MRN# [id number]   YOB: 1933 Age: 89 year old     Primary cardiologist: Dr. Sinha    Reason for visit: Atrial fibrillation    History of presenting illness:    Moira Andre is a pleasant 89 year old patient with past medical history significant for chronic diastolic heart failure, atrial fibrillation on Eliquis, CAD with remote stenting in 2007, type 2 diabetes, obstructive sleep apnea, COPD, and asthma.    She was initially seen by cardiology during admission October 2023 for acute diastolic heart failure in the setting of new onset of A-fib RVR.  She was adequately IV diuresed and transition to oral Lasix 20 mg daily.  He was appropriately started on a apixaban for elevated [id number] score.  Her Toprol-XL was increased to 100 mg twice daily for rate control.    Since that time, she has been hospitalized 3 times for acute congestive heart failure in the setting of A-fib RVR, her most recent admission 4/2024.  She has had multiple medication adjustments.  She is now on torsemide 40 mg daily and her Toprol-XL was switched to Lopressor 75 mg twice daily due to hypotension.    She returns today for follow-up.  She is overall done well since her last admission.  Her atrial fibrillation has been under good rate control and the torsemide appears to be working well for her.  She is compliant with all her medications.  She has not worn her CPAP for over a year due to a recall.  She is picking up a CPAP machine tomorrow. She otherwise denies chest pain, shortness of breath, syncope or near syncope, or palpitations.  No PND or orthopnea.    EKG in clinic today shows atrial fibrillation with controlled ventricular rates.  Her blood pressure  is well-controlled.    I reviewed her most recent labs.  Her creatinine is 1.5, GFR 33, which is a relative improvement from 4/2024.  Lipid panel shows total cholesterol 190, , HDL 48, triglycerides of 208.  She has an A1c of 7.2. TSH is within normal rhythm.  I reviewed her echocardiogram from 4/2024 that demonstrates normal LV systolic function, mild to moderate RV dysfunction.  There is mild to moderate aortic stenosis with mean gradient of 14-18 mmHg and severe thickening of the mitral valve with mitral annular calcification.  The mean gradient across mitral valve ranges from 6-9 mmHg.            Assessment and Plan:     ASSESSMENT:    Paroxysmal atrial fibrillation   Rates remain well-controlled on Lopressor 75 mg twice daily.  On apixaban 2.5 mg BID for stroke prophylaxis (dosing for age + renal function)  EKG today shows A-fib with heart rate of 79 bpm.  She remains asymptomatic.  Risk factors include obesity, obstructive sleep apnea, age, diabetes.    Chronic diastolic heart failure, NYHA class II   Euvolemic on exam, on torsemide 40 mg daily.     Obstructive sleep apnea  Has not been wearing CPAP for over a year due to recall, getting new machine this week.     CAD with remote history of PCI to OM/diagonal in 2007  Risk factors not well controlled > elevated A1C, LDL > 70  No angina     Mild to moderate aortic stenosis with a mean gradient of 14 to 18 mmHg.   Stable on recent imaging, no exertional symptoms    Moderate mitral stenosis with mean gradient of 6-9 mmHg.   Stable on recent imaging, asymptomatic    Hyperlipidemia with goal LDL < 70  Above goal, on atorvastatin 20 mg daily.    Stage III CKD with baseline creatinine of 1.4-1.6  Renal function stable    Obesity with a BMI of 40.6.      PLAN:     Overall the patient is doing well from a cardiovascular standpoint, she has no clinical signs of heart failure and rates are well-controlled.  I have made no changes to her regimen today.  We  discussed increasing her atorvastatin to 40 mg daily to further optimize lipids, however she favors repeating labs in 3 to 6 months with ongoing lifestyle changes with diet and exercise first, which I think is reasonable given her age.   She will continue her current medication regimen.  Repeat echocardiogram in 1 year for surveillance of both her aortic and mitral valve.  Follow-up with Dr. Sinha in September, as planned, with repeat lipid panel.         Bebe Llamas, JENNA, APRN, CNP  Page: [phone] (8a-5p M-F)    Orders this Visit:  Orders Placed This Encounter   Procedures    Lipid panel reflex to direct LDL Fasting    EKG 12-lead complete w/read - Clinics (performed today)    Echocardiogram Complete     No orders of the defined types were placed in this encounter.    There are no discontinued medications.    Today's clinic visit entailed:  Review of the result(s) of each unique test - echo, labs, EKG  Ordering of each unique test  Prescription drug management  35 minutes spent by me on the date of the encounter doing chart review, review of test results, interpretation of tests, and patient visit   Provider  Link to [facility] Help Grid     The level of medical decision making during this visit was of moderate complexity.           Review of Systems:     Review of Systems:  Skin:  not assessed     Eyes:  Positive for glasses  ENT:  not assessed    Respiratory:  Positive for shortness of breath;dyspnea on exertion;cough;sleep apnea;mucoid expectorant  Cardiovascular:  Negative for;chest pain;lightheadedness;dizziness;syncope or near-syncope;edema Positive for;fatigue;palpitations  Gastroenterology: Positive for reflux;heartburn;poor appetite  Genitourinary:  not assessed    Musculoskeletal:  not assessed    Neurologic:  Negative for migraine headaches;headaches  Psychiatric:  Negative for sleep disturbances  Heme/Lymph/Imm:  not assessed    Endocrine:  Positive for diabetes            Physical Exam:   Vitals: BP  "117/76   Pulse 55   Ht 1.702 m (5' 7\")   Wt 117.7 kg (259 lb 6.4 oz)   SpO2 93%   BMI 40.63 kg/m    Constitutional:  cooperative        Skin:  warm and dry to the touch        Head:  normocephalic        Eyes:  pupils equal and round        ENT:  not assessed this visit        Neck:  JVP normal        Chest:  normal breath sounds, clear to auscultation, normal A-P diameter, normal symmetry, normal respiratory excursion, no use of accessory muscles        Cardiac:   irregularly irregular rhythm     systolic ejection murmur;grade 1          Abdomen:  abdomen soft obese      Vascular: pulses full and equal                                      Extremities and Back:           Neurological:  no gross motor deficits;affect appropriate             Medications:     Current Outpatient Medications   Medication Sig Dispense Refill    acetaminophen (TYLENOL) 500 MG tablet Take 1,000 mg by mouth 2 times daily      albuterol (PROAIR HFA/PROVENTIL HFA/VENTOLIN HFA) 108 (90 Base) MCG/ACT inhaler Inhale 2 puffs into the lungs every 6 hours as needed for shortness of breath, wheezing or cough For shortness of breath      apixaban ANTICOAGULANT (ELIQUIS) 2.5 MG tablet Take 1 tablet (2.5 mg) by mouth 2 times daily 180 tablet 3    atorvastatin (LIPITOR) 20 MG tablet Take 1 tablet (20 mg) by mouth daily for cholesterol 100 tablet 3    blood glucose (ACCU-CHEK GUIDE) test strip Use to test blood sugar once daily or as directed. 100 strip 3    blood glucose (ACCU-CHEK SOFTCLIX) lancing device Lancing device to be used with lancets. 1 each 0    blood glucose monitoring (SOFTCLIX) lancets Use to test blood sugar once daily. 100 each 3    buPROPion (WELLBUTRIN SR) 100 MG 12 hr tablet Take 1 tablet (100 mg) by mouth daily for mood 90 tablet 3    calcium carbonate (TUMS) 500 MG chewable tablet Take 1 chew tab by mouth 2 times daily as needed for heartburn      Cholecalciferol (VITAMIN D) 125 MCG (5000 UT) capsule Take 1 tablet by mouth " daily      DULoxetine (CYMBALTA) 60 MG capsule Take 1 capsule (60 mg) by mouth daily 90 capsule 3    fexofenadine (ALLEGRA) 60 MG tablet Take 60 mg by mouth daily      fluticasone-salmeterol (WIXELA INHUB) 100-50 MCG/ACT inhaler USE 1 INHALATION BY MOUTH EVERY  12 HOURS 180 each 3    glimepiride (AMARYL) 2 MG tablet Take 1 tablet (2 mg) by mouth 2 times daily (before meals) 200 tablet 2    Menthol, Topical Analgesic, (ICY HOT MEDICATED SPRAY EX) Externally apply topically daily as needed (pain, in neck, back, hand)      metoprolol tartrate (LOPRESSOR) 25 MG tablet Take 3 tablets (75 mg) by mouth 2 times daily 540 tablet 1    miconazole (MICATIN) 2 % external cream Apply topically 2 times daily as needed for other (rash/irritation)      Multiple Vitamins-Minerals (HAIR SKIN AND NAILS FORMULA PO) Take 1 tablet by mouth daily      nitroGLYcerin (NITROSTAT) 0.4 MG sublingual tablet FOR CHEST PAIN PLACE 1 TABLET  UNDER TONGUE EVERY 5 MINUTES FOR 3 DOSES. IF SYMPTOMS PERSIST 5  MINUTES AFTER 1ST DOSE CALL 911 25 tablet 0    pantoprazole (PROTONIX) 20 MG EC tablet Take 1 tablet (20 mg) by mouth daily 90 tablet 3    torsemide (DEMADEX) 20 MG tablet Take 2 tablets (40 mg) by mouth daily 270 tablet 1    triamcinolone (KENALOG) 0.1 % external cream Apply topically 2 times daily as needed for irritation         Family History   Problem Relation Age of Onset    Neurologic Disorder Mother         MS - at 60's    C.A.D. Father          at 8o's, ? prostate ca    Breast Cancer No family hx of     Cancer - colorectal No family hx of        Social History     Socioeconomic History    Marital status:      Spouse name: Not on file    Number of children: Not on file    Years of education: Not on file    Highest education level: Not on file   Occupational History    Not on file   Tobacco Use    Smoking status: Former     Current packs/day: 0.00     Average packs/day: 0.3 packs/day for 1.3 years (0.3 ttl pk-yrs)     Types:  Cigarettes     Start date:      Quit date: 1973     Years since quittin.2    Smokeless tobacco: Never   Vaping Use    Vaping status: Never Used   Substance and Sexual Activity    Alcohol use: Yes     Alcohol/week: 0.0 - 1.0 standard drinks of alcohol     Comment: rarely    Drug use: No    Sexual activity: Not Currently     Partners: Male   Other Topics Concern    Parent/sibling w/ CABG, MI or angioplasty before 65F 55M? Not Asked     Service Not Asked    Blood Transfusions Not Asked    Caffeine Concern No    Occupational Exposure Not Asked    Hobby Hazards Not Asked    Sleep Concern No    Stress Concern No    Weight Concern No    Special Diet No    Back Care Not Asked    Exercise Yes     Comment: walking, swimming x2 a week    Bike Helmet Not Asked    Seat Belt Yes    Self-Exams Not Asked   Social History Narrative    , 1 step son    Work- clown for profession        Tobacco- none,smoked for couple months in     ETOH- occ 1/2 months    Diet coke- 2-3 cans/day    Exercise- swims 8 laps -3/week             Social Determinants of Health     Financial Resource Strain: Low Risk  (10/26/2023)    Financial Resource Strain     Within the past 12 months, have you or your family members you live with been unable to get utilities (heat, electricity) when it was really needed?: No   Food Insecurity: Low Risk  (10/26/2023)    Food Insecurity     Within the past 12 months, did you worry that your food would run out before you got money to buy more?: No     Within the past 12 months, did the food you bought just not last and you didn t have money to get more?: No   Transportation Needs: Low Risk  (10/26/2023)    Transportation Needs     Within the past 12 months, has lack of transportation kept you from medical appointments, getting your medicines, non-medical meetings or appointments, work, or from getting things that you need?: No   Physical Activity: Not on file   Stress: No Stress Concern  Present (6/10/2021)    [employer] [place] [employer] - Occupational Stress Questionnaire     Feeling of Stress : Not at all   Social Connections: Unknown (6/10/2021)    Social Connection and Isolation Panel [NHANES]     Frequency of Communication with Friends and Family: Not on file     Frequency of Social Gatherings with Friends and Family: Not on file     Attends Christian Services: Not on file     Active Member of Clubs or Organizations: Not on file     Attends Club or Organization Meetings: Not on file     Marital Status:    Interpersonal Safety: Low Risk  (1/11/2024)    Interpersonal Safety     Do you feel physically and emotionally safe where you currently live?: Yes     Within the past 12 months, have you been hit, slapped, kicked or otherwise physically hurt by someone?: No     Within the past 12 months, have you been humiliated or emotionally abused in other ways by your partner or ex-partner?: No   Housing Stability: Low Risk  (10/26/2023)    Housing Stability     Do you have housing? : Yes     Are you worried about losing your housing?: No            Past Medical History:     Past Medical History:   Diagnosis Date    Aortic valve sclerosis     heart murmur, no AS    Arrhythmia     PAT, PVC    Aspirin allergy     Plavix use long term    Asthma     CKD (chronic kidney disease) stage 3, GFR 30-59 ml/min (H)     x 2007 atleast    Congestive heart failure, unspecified     Depression     Diabetes mellitus (H) 2010    Diastolic dysfunction, left ventricle 2013    grade 2, nl ef    HTN (hypertension)     Lactic acidosis 08/2018    due to dehydration and metformin    Migraine headache     Mitral stenosis     mild, 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    Myocardial infarction (H) 9/2007, cath 2013 ml    BMS: stent to OM, diag, nl EF, echo /C angia 2013 , f/u cath no lesion >40%    Nephrolithiasis     right side    OA (osteoarthritis) of knee     Obesity     Rheumatoid arthritis flare (H)     prednisone    Sleep  apnea     restarted using cpap 2017    TIA (transient ischaemic attack)     Ventral hernia, unspecified, without mention of obstruction or gangrene               Past Surgical History:     Past Surgical History:   Procedure Laterality Date    APPENDECTOMY      BIOPSY BREAST      x2 -needle & lumpectomy-benign    CHOLECYSTECTOMY      CORONARY ANGIOGRAPHY ADULT ORDER  9/28/2007    Bare metal stent to OM1, Diagonal patent     CORONARY ANGIOGRAPHY ADULT ORDER  9/25/2007    Imboden stent to Diagonal    HC LEFT HEART CATHETERIZATION  8/2013    Moderate CAD    HYSTERECTOMY TOTAL ABDOMINAL      ORTHOPEDIC SURGERY      knee replacement on right side (2006), Left side (2016)    RELEASE CARPAL TUNNEL      right and left    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76  --  1.65  --  2.25  --  1.98   IRON  --   --   --   --  41  --   --   --    FEB  --   --   --   --  302  --   --   --    IRONSAT  --   --   --   --  14*  --   --   --    CURT 201  --    < >  --  139  --    < >  --     < > = values in this interval not displayed.       Lab Results   Component Value Date    WBC 11.3 (H) 04/21/2024    WBC 10.3 02/18/2021    RBC 5.40 (H) 04/21/2024    RBC 4.57 02/18/2021    HGB 13.7 04/21/2024    HGB 11.9 02/18/2021    HCT 44.7 04/21/2024    HCT 38.6 02/18/2021    MCV 83 04/21/2024    MCV 85 02/18/2021    MCH 25.4 (L) 04/21/2024    MCH  26.0 (L) 02/18/2021    MCHC 30.6 (L) 04/21/2024    MCHC 30.8 (L) 02/18/2021    RDW 16.7 (H) 04/21/2024    RDW 14.5 02/18/2021     04/21/2024     02/18/2021       Lab Results   Component Value Date     06/27/2024     02/18/2021    POTASSIUM 5.0 06/27/2024    POTASSIUM 4.9 10/24/2022    POTASSIUM 4.2 02/18/2021    CHLORIDE 103 06/27/2024    CHLORIDE 109 10/24/2022    CHLORIDE 108 02/18/2021    CO2 23 06/27/2024    CO2 27 10/24/2022    CO2 22 02/18/2021    ANIONGAP 13 06/27/2024    ANIONGAP 4 10/24/2022    ANIONGAP 6 02/18/2021     (H) 06/27/2024     (H) 04/22/2024    GLC 80 10/24/2022     (H) 02/18/2021    BUN 28.1 (H) 06/27/2024    BUN 38 (H) 10/24/2022    BUN 34 (H) 02/18/2021    CR 1.50 (H) 06/27/2024    CR 1.50 (H) 02/18/2021    GFRESTIMATED 33 (L) 06/27/2024    GFRESTIMATED 23 (L) 11/25/2023    GFRESTIMATED 31 (L) 02/18/2021    GFRESTBLACK 36 (L) 02/18/2021    TELLY 9.6 06/27/2024    TELLY 8.4 (L) 02/18/2021      Lab Results   Component Value Date    AST 14 12/27/2023    AST 12 08/24/2020    ALT 10 12/27/2023    ALT 13 08/24/2020       Lab Results   Component Value Date    A1C 7.2 (H) 06/27/2024    A1C 6.2 (H) 08/24/2020       Lab Results   Component Value Date    INR 0.93 07/19/2017    INR 0.99 09/21/2016         Thank you for allowing me to participate in the care of your patient.      Sincerely,     Bebe Llamas, [facility]  cc:   Ashia Barraza NP  [id number] [id number]

## 2024-07-01 NOTE — PATIENT INSTRUCTIONS
Today's Plan:     - Continue all your medications.   - Repeat echocardiogram in 1 year.   - Follow-up with Dr. Sinha in 6 months.      If you have questions or concerns please call my nurse team:  Razia RN ([phone]) Merlyn RN ([phone]) and Hemalatha RN ([phone])    After Hours: [phone], option #2, ask for cardiologist on-call    Scheduling phone number: [phone]    Reminder: Please bring in all current medications, over the counter supplements and vitamin bottles to your next appointment.-    It was a pleasure seeing you today!     Bebe Llamas, LINDA  7/1/2024    -

## 2024-07-01 NOTE — PROGRESS NOTES
Cardiology Clinic Progress Note  Moira Andre MRN# [id number]   YOB: 1933 Age: 89 year old     Primary cardiologist: Dr. Sinha    Reason for visit: Atrial fibrillation    History of presenting illness:    Moira Andre is a pleasant 89 year old patient with past medical history significant for chronic diastolic heart failure, atrial fibrillation on Eliquis, CAD with remote stenting in 2007, type 2 diabetes, obstructive sleep apnea, COPD, and asthma.    She was initially seen by cardiology during admission October 2023 for acute diastolic heart failure in the setting of new onset of A-fib RVR.  She was adequately IV diuresed and transition to oral Lasix 20 mg daily.  He was appropriately started on a apixaban for elevated [id number] score.  Her Toprol-XL was increased to 100 mg twice daily for rate control.    Since that time, she has been hospitalized 3 times for acute congestive heart failure in the setting of A-fib RVR, her most recent admission 4/2024.  She has had multiple medication adjustments.  She is now on torsemide 40 mg daily and her Toprol-XL was switched to Lopressor 75 mg twice daily due to hypotension.    She returns today for follow-up.  She is overall done well since her last admission.  Her atrial fibrillation has been under good rate control and the torsemide appears to be working well for her.  She is compliant with all her medications.  She has not worn her CPAP for over a year due to a recall.  She is picking up a CPAP machine tomorrow. She otherwise denies chest pain, shortness of breath, syncope or near syncope, or palpitations.  No PND or orthopnea.    EKG in clinic today shows atrial fibrillation with controlled ventricular rates.  Her blood pressure is well-controlled.    I reviewed her most recent labs.  Her creatinine is 1.5, GFR 33, which is a relative improvement from 4/2024.  Lipid panel shows total cholesterol 190, , HDL 48, triglycerides of 208.  She has  an A1c of 7.2. TSH is within normal rhythm.  I reviewed her echocardiogram from 4/2024 that demonstrates normal LV systolic function, mild to moderate RV dysfunction.  There is mild to moderate aortic stenosis with mean gradient of 14-18 mmHg and severe thickening of the mitral valve with mitral annular calcification.  The mean gradient across mitral valve ranges from 6-9 mmHg.            Assessment and Plan:     ASSESSMENT:    Paroxysmal atrial fibrillation   Rates remain well-controlled on Lopressor 75 mg twice daily.  On apixaban 2.5 mg BID for stroke prophylaxis (dosing for age + renal function)  EKG today shows A-fib with heart rate of 79 bpm.  She remains asymptomatic.  Risk factors include obesity, obstructive sleep apnea, age, diabetes.    Chronic diastolic heart failure, NYHA class II   Euvolemic on exam, on torsemide 40 mg daily.     Obstructive sleep apnea  Has not been wearing CPAP for over a year due to recall, getting new machine this week.     CAD with remote history of PCI to OM/diagonal in 2007  Risk factors not well controlled > elevated A1C, LDL > 70  No angina     Mild to moderate aortic stenosis with a mean gradient of 14 to 18 mmHg.   Stable on recent imaging, no exertional symptoms    Moderate mitral stenosis with mean gradient of 6-9 mmHg.   Stable on recent imaging, asymptomatic    Hyperlipidemia with goal LDL < 70  Above goal, on atorvastatin 20 mg daily.    Stage III CKD with baseline creatinine of 1.4-1.6  Renal function stable    Obesity with a BMI of 40.6.      PLAN:     Overall the patient is doing well from a cardiovascular standpoint, she has no clinical signs of heart failure and rates are well-controlled.  I have made no changes to her regimen today.  We discussed increasing her atorvastatin to 40 mg daily to further optimize lipids, however she favors repeating labs in 3 to 6 months with ongoing lifestyle changes with diet and exercise first, which I think is reasonable given her  "age.   She will continue her current medication regimen.  Repeat echocardiogram in 1 year for surveillance of both her aortic and mitral valve.  Follow-up with Dr. Sinha in September, as planned, with repeat lipid panel.         Bebe Llamas, JENNA, APRN, CNP  Page: [phone] (8a-5p M-F)    Orders this Visit:  Orders Placed This Encounter   Procedures    Lipid panel reflex to direct LDL Fasting    EKG 12-lead complete w/read - Clinics (performed today)    Echocardiogram Complete     No orders of the defined types were placed in this encounter.    There are no discontinued medications.    Today's clinic visit entailed:  Review of the result(s) of each unique test - echo, labs, EKG  Ordering of each unique test  Prescription drug management  35 minutes spent by me on the date of the encounter doing chart review, review of test results, interpretation of tests, and patient visit   Provider  Link to [facility] Help Grid     The level of medical decision making during this visit was of moderate complexity.           Review of Systems:     Review of Systems:  Skin:  not assessed     Eyes:  Positive for glasses  ENT:  not assessed    Respiratory:  Positive for shortness of breath;dyspnea on exertion;cough;sleep apnea;mucoid expectorant  Cardiovascular:  Negative for;chest pain;lightheadedness;dizziness;syncope or near-syncope;edema Positive for;fatigue;palpitations  Gastroenterology: Positive for reflux;heartburn;poor appetite  Genitourinary:  not assessed    Musculoskeletal:  not assessed    Neurologic:  Negative for migraine headaches;headaches  Psychiatric:  Negative for sleep disturbances  Heme/Lymph/Imm:  not assessed    Endocrine:  Positive for diabetes            Physical Exam:   Vitals: /76   Pulse 55   Ht 1.702 m (5' 7\")   Wt 117.7 kg (259 lb 6.4 oz)   SpO2 93%   BMI 40.63 kg/m    Constitutional:  cooperative        Skin:  warm and dry to the touch        Head:  normocephalic        Eyes:  pupils equal " and round        ENT:  not assessed this visit        Neck:  JVP normal        Chest:  normal breath sounds, clear to auscultation, normal A-P diameter, normal symmetry, normal respiratory excursion, no use of accessory muscles        Cardiac:   irregularly irregular rhythm     systolic ejection murmur;grade 1          Abdomen:  abdomen soft obese      Vascular: pulses full and equal                                      Extremities and Back:           Neurological:  no gross motor deficits;affect appropriate             Medications:     Current Outpatient Medications   Medication Sig Dispense Refill    acetaminophen (TYLENOL) 500 MG tablet Take 1,000 mg by mouth 2 times daily      albuterol (PROAIR HFA/PROVENTIL HFA/VENTOLIN HFA) 108 (90 Base) MCG/ACT inhaler Inhale 2 puffs into the lungs every 6 hours as needed for shortness of breath, wheezing or cough For shortness of breath      apixaban ANTICOAGULANT (ELIQUIS) 2.5 MG tablet Take 1 tablet (2.5 mg) by mouth 2 times daily 180 tablet 3    atorvastatin (LIPITOR) 20 MG tablet Take 1 tablet (20 mg) by mouth daily for cholesterol 100 tablet 3    blood glucose (ACCU-CHEK GUIDE) test strip Use to test blood sugar once daily or as directed. 100 strip 3    blood glucose (ACCU-CHEK SOFTCLIX) lancing device Lancing device to be used with lancets. 1 each 0    blood glucose monitoring (SOFTCLIX) lancets Use to test blood sugar once daily. 100 each 3    buPROPion (WELLBUTRIN SR) 100 MG 12 hr tablet Take 1 tablet (100 mg) by mouth daily for mood 90 tablet 3    calcium carbonate (TUMS) 500 MG chewable tablet Take 1 chew tab by mouth 2 times daily as needed for heartburn      Cholecalciferol (VITAMIN D) 125 MCG (5000 UT) capsule Take 1 tablet by mouth daily      DULoxetine (CYMBALTA) 60 MG capsule Take 1 capsule (60 mg) by mouth daily 90 capsule 3    fexofenadine (ALLEGRA) 60 MG tablet Take 60 mg by mouth daily      fluticasone-salmeterol (WIXELA INHUB) 100-50 MCG/ACT inhaler USE  1 INHALATION BY MOUTH EVERY  12 HOURS 180 each 3    glimepiride (AMARYL) 2 MG tablet Take 1 tablet (2 mg) by mouth 2 times daily (before meals) 200 tablet 2    Menthol, Topical Analgesic, (ICY HOT MEDICATED SPRAY EX) Externally apply topically daily as needed (pain, in neck, back, hand)      metoprolol tartrate (LOPRESSOR) 25 MG tablet Take 3 tablets (75 mg) by mouth 2 times daily 540 tablet 1    miconazole (MICATIN) 2 % external cream Apply topically 2 times daily as needed for other (rash/irritation)      Multiple Vitamins-Minerals (HAIR SKIN AND NAILS FORMULA PO) Take 1 tablet by mouth daily      nitroGLYcerin (NITROSTAT) 0.4 MG sublingual tablet FOR CHEST PAIN PLACE 1 TABLET  UNDER TONGUE EVERY 5 MINUTES FOR 3 DOSES. IF SYMPTOMS PERSIST 5  MINUTES AFTER 1ST DOSE CALL 911 25 tablet 0    pantoprazole (PROTONIX) 20 MG EC tablet Take 1 tablet (20 mg) by mouth daily 90 tablet 3    torsemide (DEMADEX) 20 MG tablet Take 2 tablets (40 mg) by mouth daily 270 tablet 1    triamcinolone (KENALOG) 0.1 % external cream Apply topically 2 times daily as needed for irritation         Family History   Problem Relation Age of Onset    Neurologic Disorder Mother         MS - at 60's    C.A.D. Father          at 8o's, ? prostate ca    Breast Cancer No family hx of     Cancer - colorectal No family hx of        Social History     Socioeconomic History    Marital status:      Spouse name: Not on file    Number of children: Not on file    Years of education: Not on file    Highest education level: Not on file   Occupational History    Not on file   Tobacco Use    Smoking status: Former     Current packs/day: 0.00     Average packs/day: 0.3 packs/day for 1.3 years (0.3 ttl pk-yrs)     Types: Cigarettes     Start date:      Quit date: 1973     Years since quittin.2    Smokeless tobacco: Never   Vaping Use    Vaping status: Never Used   Substance and Sexual Activity    Alcohol use: Yes     Alcohol/week: 0.0 -  1.0 standard drinks of alcohol     Comment: rarely    Drug use: No    Sexual activity: Not Currently     Partners: Male   Other Topics Concern    Parent/sibling w/ CABG, MI or angioplasty before 65F 55M? Not Asked     Service Not Asked    Blood Transfusions Not Asked    Caffeine Concern No    Occupational Exposure Not Asked    Hobby Hazards Not Asked    Sleep Concern No    Stress Concern No    Weight Concern No    Special Diet No    Back Care Not Asked    Exercise Yes     Comment: walking, swimming x2 a week    Bike Helmet Not Asked    Seat Belt Yes    Self-Exams Not Asked   Social History Narrative    , 1 step son    Work- clown for profession        Tobacco- none,smoked for couple months in 1970's    ETOH- occ 1/2 months    Diet coke- 2-3 cans/day    Exercise- swims 8 laps -3/week             Social Determinants of Health     Financial Resource Strain: Low Risk  (10/26/2023)    Financial Resource Strain     Within the past 12 months, have you or your family members you live with been unable to get utilities (heat, electricity) when it was really needed?: No   Food Insecurity: Low Risk  (10/26/2023)    Food Insecurity     Within the past 12 months, did you worry that your food would run out before you got money to buy more?: No     Within the past 12 months, did the food you bought just not last and you didn t have money to get more?: No   Transportation Needs: Low Risk  (10/26/2023)    Transportation Needs     Within the past 12 months, has lack of transportation kept you from medical appointments, getting your medicines, non-medical meetings or appointments, work, or from getting things that you need?: No   Physical Activity: Not on file   Stress: No Stress Concern Present (6/10/2021)    [place] [employer] - Occupational Stress Questionnaire     Feeling of Stress : Not at all   Social Connections: Unknown (6/10/2021)    Social Connection and Isolation Panel [NHANES]      Frequency of Communication with Friends and Family: Not on file     Frequency of Social Gatherings with Friends and Family: Not on file     Attends [employer] Services: Not on file     Active Member of Clubs or Organizations: Not on file     Attends Club or Organization Meetings: Not on file     Marital Status:    Interpersonal Safety: Low Risk  (1/11/2024)    Interpersonal Safety     Do you feel physically and emotionally safe where you currently live?: Yes     Within the past 12 months, have you been hit, slapped, kicked or otherwise physically hurt by someone?: No     Within the past 12 months, have you been humiliated or emotionally abused in other ways by your partner or ex-partner?: No   Housing Stability: Low Risk  (10/26/2023)    Housing Stability     Do you have housing? : Yes     Are you worried about losing your housing?: No            Past Medical History:     Past Medical History:   Diagnosis Date    Aortic valve sclerosis     heart murmur, no AS    Arrhythmia     PAT, PVC    Aspirin allergy     Plavix use long term    Asthma     CKD (chronic kidney disease) stage 3, GFR 30-59 ml/min (H)     x 2007 atleast    Congestive heart failure, unspecified     Depression     Diabetes mellitus (H) 2010    Diastolic dysfunction, left ventricle 2013    grade 2, nl ef    HTN (hypertension)     Lactic acidosis 08/2018    due to dehydration and metformin    Migraine headache     Mitral stenosis     mild, 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    Myocardial infarction (H) 9/2007, cath 2013 ml    BMS: stent to OM, diag, nl EF, echo /C angia 2013 , f/u cath no lesion >40%    Nephrolithiasis     right side    OA (osteoarthritis) of knee     Obesity     Rheumatoid arthritis flare (H)     prednisone    Sleep apnea     restarted using cpap 2017    TIA (transient ischaemic attack)     Ventral hernia, unspecified, without mention of obstruction or gangrene               Past Surgical History:     Past Surgical History:   Procedure Laterality  Date    APPENDECTOMY      BIOPSY BREAST      x2 -needle & lumpectomy-benign    CHOLECYSTECTOMY      CORONARY ANGIOGRAPHY ADULT ORDER  9/28/2007    Bare metal stent to OM1, Diagonal patent     CORONARY ANGIOGRAPHY ADULT ORDER  9/25/2007    [place] stent to Diagonal    HC LEFT HEART CATHETERIZATION  8/2013    Moderate CAD    HYSTERECTOMY TOTAL ABDOMINAL      ORTHOPEDIC SURGERY      knee replacement on right side (2006), Left side (2016)    RELEASE CARPAL TUNNEL      right and left    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76  --  1.65  --  2.25  --  1.98   IRON  --   --   --   --  41  --   --   --    FEB  --   --   --   --  302  --   --   --    IRONSAT  --   --   --   --  14*  --   --   --    CURT 201  --    < >  --  139  --    < >  --     < > = values in this interval not displayed.       Lab Results   Component Value Date    WBC 11.3 (H) 04/21/2024    WBC 10.3 02/18/2021    RBC 5.40 (H) 04/21/2024    RBC 4.57 02/18/2021    HGB 13.7 04/21/2024    HGB 11.9 02/18/2021    HCT 44.7 04/21/2024    HCT 38.6 02/18/2021    MCV 83 04/21/2024    MCV 85 02/18/2021    MCH 25.4 (L) 04/21/2024    MCH 26.0 (L) 02/18/2021    MCHC 30.6 (L) 04/21/2024    MCHC 30.8 (L) 02/18/2021    RDW 16.7 (H) 04/21/2024    RDW 14.5 02/18/2021     04/21/2024     02/18/2021       Lab Results   Component Value Date     06/27/2024    NA  136 02/18/2021    POTASSIUM 5.0 06/27/2024    POTASSIUM 4.9 10/24/2022    POTASSIUM 4.2 02/18/2021    CHLORIDE 103 06/27/2024    CHLORIDE 109 10/24/2022    CHLORIDE 108 02/18/2021    CO2 23 06/27/2024    CO2 27 10/24/2022    CO2 22 02/18/2021    ANIONGAP 13 06/27/2024    ANIONGAP 4 10/24/2022    ANIONGAP 6 02/18/2021     (H) 06/27/2024     (H) 04/22/2024    GLC 80 10/24/2022     (H) 02/18/2021    BUN 28.1 (H) 06/27/2024    BUN 38 (H) 10/24/2022    BUN 34 (H) 02/18/2021    CR 1.50 (H) 06/27/2024    CR 1.50 (H) 02/18/2021    GFRESTIMATED 33 (L) 06/27/2024    GFRESTIMATED 23 (L) 11/25/2023    GFRESTIMATED 31 (L) 02/18/2021    GFRESTBLACK 36 (L) 02/18/2021    TELLY 9.6 06/27/2024    TELLY 8.4 (L) 02/18/2021      Lab Results   Component Value Date    AST 14 12/27/2023    AST 12 08/24/2020    ALT 10 12/27/2023    ALT 13 08/24/2020       Lab Results   Component Value Date    A1C 7.2 (H) 06/27/2024    A1C 6.2 (H) 08/24/2020       Lab Results   Component Value Date    INR 0.93 07/19/2017    INR 0.99 09/21/2016

## 2024-07-01 NOTE — RESULT ENCOUNTER NOTE
Please notify patient by sending following letter with copy of test results      Mert Pizarro,    This is to inform you regarding your test result.    Your total cholesterol is normal.  HDL which is called good cholesterol is low.  Your LDL cholesterol is normal.  This is often call bad cholesterol and high levels increase the risk for heart attacks and strokes.  Eat low cholesterol low fat  diet and do regular physical activity. Avoid high sugar containing food.  Chronic kidney disease is stable   HbA1c which is average glucose during last 3 months is 7.2%.  Ferritin which is iron stores in the body is normal.  Parathyroid hormone level is normal.        Sincerely,      Dr.Nasima Kerline MD,FACP

## 2024-07-02 ENCOUNTER — DOCUMENTATION ONLY (OUTPATIENT)
Dept: SLEEP MEDICINE | Facility: CLINIC | Age: 89
End: 2024-07-02
Payer: COMMERCIAL

## 2024-07-02 ENCOUNTER — PATIENT OUTREACH (OUTPATIENT)
Dept: CARE COORDINATION | Facility: CLINIC | Age: 89
End: 2024-07-02

## 2024-07-02 DIAGNOSIS — G47.33 OBSTRUCTIVE SLEEP APNEA (ADULT) (PEDIATRIC): Primary | ICD-10-CM

## 2024-07-02 NOTE — PROGRESS NOTES
Clinic Care Coordination Contact  [facility]/Voicemail    Clinical Data: Care Coordinator Outreach    Outreach Documentation Number of Outreach Attempt   5/29/2024   9:26 AM 1   6/12/2024   3:40 AM 1   7/2/2024   4:01 PM 2       Left message on patient's voicemail with call back information and requested return call.    Plan: Care Coordinator will try to reach patient again in 10 business days.    BEATRIZ Velazquez  Clinic Care Coordination  [facility]: [facility], [place], [place], and [facility] [place] [facility]  Phone: [phone]

## 2024-07-02 NOTE — PROGRESS NOTES
Patient was offered choice of vendor and chose [facility].  Patient Moira Andre was set up at [place]  on July 2, 2024. Patient received a Resmed Airsense 11 Pressures were set at  5-15 cm H2O.   Patient s ramp is 5 cm H2O for Auto and FLEX/EPR is EPR 2.  Patient received a Resmed Mask name: AIRFIT N20  Nasal mask size Medium, heated tubing and heated humidifier.  Patient has the following compliance requirements: using and visit requirements  Patient has a follow up on TBD.  Darrick Mckeon

## 2024-07-03 ENCOUNTER — PATIENT OUTREACH (OUTPATIENT)
Dept: CARE COORDINATION | Facility: CLINIC | Age: 89
End: 2024-07-03
Payer: COMMERCIAL

## 2024-07-10 ENCOUNTER — TELEPHONE (OUTPATIENT)
Dept: FAMILY MEDICINE | Facility: CLINIC | Age: 89
End: 2024-07-10
Payer: COMMERCIAL

## 2024-07-10 NOTE — TELEPHONE ENCOUNTER
Home care saw patient yesterday and patient was having weakness and fatigue. They are calling requesting orders for UA/UC. LILLI Walker will be seen patient at 10 am today. Denise's number [phone] confidential    She reports patient has not had an appetite for 3 days and has mostly only had sprit, weakness, and fatigue. Her vitals have been O2 94% NC 2L, weight 252 lbs, /100 (had not taken her medication for 3 days) and pulse 84.     Routing to triage to call patient.    Jessica RICHARD RN  [facility] Triage Team

## 2024-07-10 NOTE — TELEPHONE ENCOUNTER
Patient states she is too tired/weak to walk to her car, drive to clinic, and walk into the office today. States she feels better today than she has in the last 4 days. She slept well and is feeling improvement this morning. She said she is able to come in on Monday but does not want to push it today. Patient thought today was Friday. Writer informed her today was Wednesday, and she responded she is able to come in tomorrow.   Also states she hasn't eaten much but has been pushing fluids.     She currently declines EMS. Writer advised patient to seek care today if symptoms worsen at all, patient agrees and said if she feels worse she'll call 911 to transport her to the ED today.     Routing to PCP as FYI      Future Appointments 7/10/2024 - 1/6/2025        Date Visit Type Length Department Provider     7/11/2024 11:30 AM OFFICE VISIT 30 min  FAMILY PRAC/Helen Mann PA-C    Location Instructions:     [facility] is in [address] of the [facility] at [address] This is just south of [facility] and the [facility] exit off of [address]. Free parking is available; access the lot by turning east from [facility] onto [address]. Through the main entrance, the clinic is directly to the left.              9/24/2024  4:15 PM RETURN CARDIOLOGY 30 min [facility] HRT CARDIO CTR Jozef Sinha MD    Location Instructions:     Our clinic is located at [address], [facility] [address]. You can park your vehicle at the parking ramp west of [facility] across the street from [facility]. You will cross the skyway to access our clinic on the 2nd floor.              10/2/2024  2:00 PM RETURN SLEEP 30 min  SLEEP CENTER Noam Nieto MD    Location Instructions:     Sleep Clinic Appointments: Park in the East parking ramp near the Emergency Room and enter the Physicians Building on Level B,  the lowest level. Check in with the  in [address]. &nbsp;  Sleep Study Appointments: Park in the East parking ramp near the Emergency Room and enter the Physicians Building on Level B, the lowest level. Check in with the  in [address].              11/1/2024  2:00 PM OFFICE VISIT 30 min [facility] FAMILY PRAC/Maryan Vaughan MD    Location Instructions:     [facility] is in [address] of the [facility] at [address] This is just south of [facility] and the [employer] exit off of [address]. Free parking is available; access the lot by turning east from [facility] onto [address]. Through the main entrance, the clinic is directly to the left.

## 2024-07-10 NOTE — TELEPHONE ENCOUNTER
Per homecare RN - Requesting UA/UC for upcoming home care visit TODAY at 10am.      Lab pending for review~

## 2024-07-11 ENCOUNTER — APPOINTMENT (OUTPATIENT)
Dept: GENERAL RADIOLOGY | Facility: CLINIC | Age: 89
DRG: 242 | End: 2024-07-11
Attending: EMERGENCY MEDICINE
Payer: COMMERCIAL

## 2024-07-11 ENCOUNTER — HOSPITAL ENCOUNTER (INPATIENT)
Facility: CLINIC | Age: 89
LOS: 3 days | Discharge: HOME HEALTH | DRG: 242 | End: 2024-07-14
Attending: EMERGENCY MEDICINE | Admitting: INTERNAL MEDICINE
Payer: COMMERCIAL

## 2024-07-11 ENCOUNTER — OFFICE VISIT (OUTPATIENT)
Dept: FAMILY MEDICINE | Facility: CLINIC | Age: 89
End: 2024-07-11
Payer: COMMERCIAL

## 2024-07-11 VITALS
DIASTOLIC BLOOD PRESSURE: 66 MMHG | BODY MASS INDEX: 39.55 KG/M2 | TEMPERATURE: 96.9 F | WEIGHT: 252 LBS | SYSTOLIC BLOOD PRESSURE: 117 MMHG | RESPIRATION RATE: 18 BRPM | OXYGEN SATURATION: 97 % | HEART RATE: 111 BPM | HEIGHT: 67 IN

## 2024-07-11 DIAGNOSIS — Z95.0 CARDIAC PACEMAKER IN SITU: ICD-10-CM

## 2024-07-11 DIAGNOSIS — J44.9 CHRONIC OBSTRUCTIVE PULMONARY DISEASE, UNSPECIFIED COPD TYPE (H): ICD-10-CM

## 2024-07-11 DIAGNOSIS — R06.02 SOB (SHORTNESS OF BREATH): ICD-10-CM

## 2024-07-11 DIAGNOSIS — R06.02 SHORTNESS OF BREATH: ICD-10-CM

## 2024-07-11 DIAGNOSIS — E66.01 MORBID OBESITY (H): ICD-10-CM

## 2024-07-11 DIAGNOSIS — R53.1 WEAKNESS: ICD-10-CM

## 2024-07-11 DIAGNOSIS — I50.9 ACUTE ON CHRONIC CONGESTIVE HEART FAILURE, UNSPECIFIED HEART FAILURE TYPE (H): ICD-10-CM

## 2024-07-11 DIAGNOSIS — R79.89 ELEVATED TROPONIN: ICD-10-CM

## 2024-07-11 DIAGNOSIS — N39.0 URINARY TRACT INFECTION WITHOUT HEMATURIA, SITE UNSPECIFIED: Primary | ICD-10-CM

## 2024-07-11 DIAGNOSIS — M62.81 GENERALIZED MUSCLE WEAKNESS: ICD-10-CM

## 2024-07-11 DIAGNOSIS — I48.91 ATRIAL FIBRILLATION WITH RVR (H): Primary | ICD-10-CM

## 2024-07-11 LAB
ALBUMIN UR-MCNC: 100 MG/DL
ANION GAP SERPL CALCULATED.3IONS-SCNC: 14 MMOL/L (ref 7–15)
APPEARANCE UR: ABNORMAL
ATRIAL RATE - MUSE: NORMAL BPM
BACTERIA #/AREA URNS HPF: ABNORMAL /HPF
BASOPHILS # BLD AUTO: 0 10E3/UL (ref 0–0.2)
BASOPHILS NFR BLD AUTO: 0 %
BILIRUB UR QL STRIP: NEGATIVE
BUN SERPL-MCNC: 58 MG/DL (ref 8–23)
CALCIUM SERPL-MCNC: 9 MG/DL (ref 8.2–9.6)
CHLORIDE SERPL-SCNC: 100 MMOL/L (ref 98–107)
COLOR UR AUTO: YELLOW
CREAT SERPL-MCNC: 2.5 MG/DL (ref 0.51–0.95)
DEPRECATED HCO3 PLAS-SCNC: 24 MMOL/L (ref 22–29)
DIASTOLIC BLOOD PRESSURE - MUSE: NORMAL MMHG
EGFRCR SERPLBLD CKD-EPI 2021: 18 ML/MIN/1.73M2
ELLIPTOCYTES BLD QL SMEAR: SLIGHT
EOSINOPHIL # BLD AUTO: 0.2 10E3/UL (ref 0–0.7)
EOSINOPHIL NFR BLD AUTO: 2 %
ERYTHROCYTE [DISTWIDTH] IN BLOOD BY AUTOMATED COUNT: 17.1 % (ref 10–15)
ERYTHROCYTE [DISTWIDTH] IN BLOOD BY AUTOMATED COUNT: 17.5 % (ref 10–15)
GLUCOSE SERPL-MCNC: 169 MG/DL (ref 70–99)
GLUCOSE UR STRIP-MCNC: NEGATIVE MG/DL
HCT VFR BLD AUTO: 41.2 % (ref 35–47)
HCT VFR BLD AUTO: 43.4 % (ref 35–47)
HGB BLD-MCNC: 12.7 G/DL (ref 11.7–15.7)
HGB BLD-MCNC: 13 G/DL (ref 11.7–15.7)
HGB UR QL STRIP: ABNORMAL
IMM GRANULOCYTES # BLD: 0.1 10E3/UL
IMM GRANULOCYTES NFR BLD: 1 %
INTERPRETATION ECG - MUSE: NORMAL
KETONES UR STRIP-MCNC: NEGATIVE MG/DL
LEUKOCYTE ESTERASE UR QL STRIP: ABNORMAL
LYMPHOCYTES # BLD AUTO: 1.6 10E3/UL (ref 0.8–5.3)
LYMPHOCYTES NFR BLD AUTO: 15 %
MCH RBC QN AUTO: 25.3 PG (ref 26.5–33)
MCH RBC QN AUTO: 26.2 PG (ref 26.5–33)
MCHC RBC AUTO-ENTMCNC: 30 G/DL (ref 31.5–36.5)
MCHC RBC AUTO-ENTMCNC: 30.8 G/DL (ref 31.5–36.5)
MCV RBC AUTO: 85 FL (ref 78–100)
MCV RBC AUTO: 85 FL (ref 78–100)
MONOCYTES # BLD AUTO: 0.9 10E3/UL (ref 0–1.3)
MONOCYTES NFR BLD AUTO: 9 %
NEUTROPHILS # BLD AUTO: 7.5 10E3/UL (ref 1.6–8.3)
NEUTROPHILS NFR BLD AUTO: 73 %
NITRATE UR QL: NEGATIVE
NRBC # BLD AUTO: 0 10E3/UL
NRBC BLD AUTO-RTO: 0 /100
NT-PROBNP SERPL-MCNC: ABNORMAL PG/ML (ref 0–1800)
P AXIS - MUSE: NORMAL DEGREES
PH UR STRIP: 5.5 [PH] (ref 5–7)
PLAT MORPH BLD: ABNORMAL
PLATELET # BLD AUTO: 331 10E3/UL (ref 150–450)
PLATELET # BLD AUTO: 336 10E3/UL (ref 150–450)
POTASSIUM SERPL-SCNC: 4.2 MMOL/L (ref 3.4–5.3)
PR INTERVAL - MUSE: NORMAL MS
QRS DURATION - MUSE: 104 MS
QT - MUSE: 350 MS
QTC - MUSE: 482 MS
R AXIS - MUSE: -22 DEGREES
RBC # BLD AUTO: 4.85 10E6/UL (ref 3.8–5.2)
RBC # BLD AUTO: 5.13 10E6/UL (ref 3.8–5.2)
RBC #/AREA URNS AUTO: ABNORMAL /HPF
RBC MORPH BLD: ABNORMAL
SODIUM SERPL-SCNC: 138 MMOL/L (ref 135–145)
SP GR UR STRIP: 1.01 (ref 1–1.03)
SQUAMOUS #/AREA URNS AUTO: ABNORMAL /LPF
SYSTOLIC BLOOD PRESSURE - MUSE: NORMAL MMHG
T AXIS - MUSE: 124 DEGREES
TROPONIN T SERPL HS-MCNC: 39 NG/L
TROPONIN T SERPL HS-MCNC: 45 NG/L
UROBILINOGEN UR STRIP-ACNC: 0.2 E.U./DL
VENTRICULAR RATE- MUSE: 114 BPM
WBC # BLD AUTO: 10.1 10E3/UL (ref 4–11)
WBC # BLD AUTO: 10.2 10E3/UL (ref 4–11)
WBC #/AREA URNS AUTO: >100 /HPF

## 2024-07-11 PROCEDURE — 84484 ASSAY OF TROPONIN QUANT: CPT | Performed by: EMERGENCY MEDICINE

## 2024-07-11 PROCEDURE — 87086 URINE CULTURE/COLONY COUNT: CPT | Mod: GZ | Performed by: PHYSICIAN ASSISTANT

## 2024-07-11 PROCEDURE — 93005 ELECTROCARDIOGRAM TRACING: CPT

## 2024-07-11 PROCEDURE — 99223 1ST HOSP IP/OBS HIGH 75: CPT | Performed by: INTERNAL MEDICINE

## 2024-07-11 PROCEDURE — 210N000002 HC R&B HEART CARE

## 2024-07-11 PROCEDURE — 36415 COLL VENOUS BLD VENIPUNCTURE: CPT | Performed by: INTERNAL MEDICINE

## 2024-07-11 PROCEDURE — 99214 OFFICE O/P EST MOD 30 MIN: CPT | Performed by: PHYSICIAN ASSISTANT

## 2024-07-11 PROCEDURE — 85025 COMPLETE CBC W/AUTO DIFF WBC: CPT | Performed by: PHYSICIAN ASSISTANT

## 2024-07-11 PROCEDURE — 85027 COMPLETE CBC AUTOMATED: CPT | Performed by: EMERGENCY MEDICINE

## 2024-07-11 PROCEDURE — 81001 URINALYSIS AUTO W/SCOPE: CPT | Performed by: PHYSICIAN ASSISTANT

## 2024-07-11 PROCEDURE — 80048 BASIC METABOLIC PNL TOTAL CA: CPT | Performed by: EMERGENCY MEDICINE

## 2024-07-11 PROCEDURE — 83605 ASSAY OF LACTIC ACID: CPT | Performed by: INTERNAL MEDICINE

## 2024-07-11 PROCEDURE — 83880 ASSAY OF NATRIURETIC PEPTIDE: CPT | Performed by: EMERGENCY MEDICINE

## 2024-07-11 PROCEDURE — 99285 EMERGENCY DEPT VISIT HI MDM: CPT | Mod: 25

## 2024-07-11 PROCEDURE — 87186 SC STD MICRODIL/AGAR DIL: CPT | Performed by: PHYSICIAN ASSISTANT

## 2024-07-11 PROCEDURE — 71046 X-RAY EXAM CHEST 2 VIEWS: CPT

## 2024-07-11 PROCEDURE — 36415 COLL VENOUS BLD VENIPUNCTURE: CPT | Performed by: EMERGENCY MEDICINE

## 2024-07-11 PROCEDURE — 36415 COLL VENOUS BLD VENIPUNCTURE: CPT | Performed by: PHYSICIAN ASSISTANT

## 2024-07-11 PROCEDURE — 84484 ASSAY OF TROPONIN QUANT: CPT | Performed by: PHYSICIAN ASSISTANT

## 2024-07-11 RX ORDER — ACETAMINOPHEN 650 MG/1
650 SUPPOSITORY RECTAL EVERY 4 HOURS PRN
Status: DISCONTINUED | OUTPATIENT
Start: 2024-07-11 | End: 2024-07-14 | Stop reason: HOSPADM

## 2024-07-11 RX ORDER — FEXOFENADINE HCL 60 MG/1
60 TABLET, FILM COATED ORAL DAILY
Status: DISCONTINUED | OUTPATIENT
Start: 2024-07-12 | End: 2024-07-14 | Stop reason: HOSPADM

## 2024-07-11 RX ORDER — PANTOPRAZOLE SODIUM 20 MG/1
20 TABLET, DELAYED RELEASE ORAL DAILY
Status: DISCONTINUED | OUTPATIENT
Start: 2024-07-12 | End: 2024-07-14 | Stop reason: HOSPADM

## 2024-07-11 RX ORDER — ONDANSETRON 4 MG/1
4 TABLET, ORALLY DISINTEGRATING ORAL EVERY 6 HOURS PRN
Status: DISCONTINUED | OUTPATIENT
Start: 2024-07-11 | End: 2024-07-14 | Stop reason: HOSPADM

## 2024-07-11 RX ORDER — ALBUTEROL SULFATE 90 UG/1
2 AEROSOL, METERED RESPIRATORY (INHALATION) EVERY 6 HOURS PRN
Status: DISCONTINUED | OUTPATIENT
Start: 2024-07-11 | End: 2024-07-14 | Stop reason: HOSPADM

## 2024-07-11 RX ORDER — AMOXICILLIN 250 MG
2 CAPSULE ORAL 2 TIMES DAILY PRN
Status: DISCONTINUED | OUTPATIENT
Start: 2024-07-11 | End: 2024-07-14 | Stop reason: HOSPADM

## 2024-07-11 RX ORDER — LIDOCAINE 40 MG/G
CREAM TOPICAL
Status: DISCONTINUED | OUTPATIENT
Start: 2024-07-11 | End: 2024-07-14 | Stop reason: HOSPADM

## 2024-07-11 RX ORDER — CALCIUM CARBONATE 500 MG/1
1000 TABLET, CHEWABLE ORAL 4 TIMES DAILY PRN
Status: DISCONTINUED | OUTPATIENT
Start: 2024-07-11 | End: 2024-07-14 | Stop reason: HOSPADM

## 2024-07-11 RX ORDER — NITROGLYCERIN 0.4 MG/1
0.4 TABLET SUBLINGUAL EVERY 5 MIN PRN
Status: DISCONTINUED | OUTPATIENT
Start: 2024-07-11 | End: 2024-07-14 | Stop reason: HOSPADM

## 2024-07-11 RX ORDER — FLUTICASONE FUROATE AND VILANTEROL 100; 25 UG/1; UG/1
1 POWDER RESPIRATORY (INHALATION) DAILY
Status: DISCONTINUED | OUTPATIENT
Start: 2024-07-12 | End: 2024-07-14 | Stop reason: HOSPADM

## 2024-07-11 RX ORDER — CEPHALEXIN 500 MG/1
500 CAPSULE ORAL 2 TIMES DAILY
Qty: 14 CAPSULE | Refills: 0 | Status: SHIPPED | OUTPATIENT
Start: 2024-07-11 | End: 2024-08-03

## 2024-07-11 RX ORDER — ATORVASTATIN CALCIUM 20 MG/1
20 TABLET, FILM COATED ORAL DAILY
Status: DISCONTINUED | OUTPATIENT
Start: 2024-07-12 | End: 2024-07-14 | Stop reason: HOSPADM

## 2024-07-11 RX ORDER — DULOXETIN HYDROCHLORIDE 60 MG/1
60 CAPSULE, DELAYED RELEASE ORAL DAILY
Status: DISCONTINUED | OUTPATIENT
Start: 2024-07-12 | End: 2024-07-14 | Stop reason: HOSPADM

## 2024-07-11 RX ORDER — MULTIPLE VITAMINS W/ MINERALS TAB 9MG-400MCG
TAB ORAL DAILY
Status: DISCONTINUED | OUTPATIENT
Start: 2024-07-12 | End: 2024-07-14 | Stop reason: HOSPADM

## 2024-07-11 RX ORDER — BUPROPION HYDROCHLORIDE 100 MG/1
100 TABLET, EXTENDED RELEASE ORAL DAILY
Status: DISCONTINUED | OUTPATIENT
Start: 2024-07-12 | End: 2024-07-14 | Stop reason: HOSPADM

## 2024-07-11 RX ORDER — ACETAMINOPHEN 500 MG
1000 TABLET ORAL 2 TIMES DAILY
Status: DISCONTINUED | OUTPATIENT
Start: 2024-07-12 | End: 2024-07-14 | Stop reason: HOSPADM

## 2024-07-11 RX ORDER — FUROSEMIDE 10 MG/ML
40 INJECTION INTRAMUSCULAR; INTRAVENOUS ONCE
Status: COMPLETED | OUTPATIENT
Start: 2024-07-11 | End: 2024-07-12

## 2024-07-11 RX ORDER — ONDANSETRON 2 MG/ML
4 INJECTION INTRAMUSCULAR; INTRAVENOUS EVERY 6 HOURS PRN
Status: DISCONTINUED | OUTPATIENT
Start: 2024-07-11 | End: 2024-07-14 | Stop reason: HOSPADM

## 2024-07-11 RX ORDER — ACETAMINOPHEN 325 MG/1
650 TABLET ORAL EVERY 4 HOURS PRN
Status: DISCONTINUED | OUTPATIENT
Start: 2024-07-11 | End: 2024-07-14 | Stop reason: HOSPADM

## 2024-07-11 RX ORDER — AMOXICILLIN 250 MG
1 CAPSULE ORAL 2 TIMES DAILY PRN
Status: DISCONTINUED | OUTPATIENT
Start: 2024-07-11 | End: 2024-07-14 | Stop reason: HOSPADM

## 2024-07-11 RX ORDER — CEFTRIAXONE 1 G/1
1 INJECTION, POWDER, FOR SOLUTION INTRAMUSCULAR; INTRAVENOUS EVERY 24 HOURS
Status: DISCONTINUED | OUTPATIENT
Start: 2024-07-11 | End: 2024-07-14 | Stop reason: HOSPADM

## 2024-07-11 ASSESSMENT — PAIN SCALES - GENERAL: PAINLEVEL: NO PAIN (0)

## 2024-07-11 ASSESSMENT — ACTIVITIES OF DAILY LIVING (ADL)
ADLS_ACUITY_SCORE: 40

## 2024-07-11 ASSESSMENT — COLUMBIA-SUICIDE SEVERITY RATING SCALE - C-SSRS
6. HAVE YOU EVER DONE ANYTHING, STARTED TO DO ANYTHING, OR PREPARED TO DO ANYTHING TO END YOUR LIFE?: NO
1. IN THE PAST MONTH, HAVE YOU WISHED YOU WERE DEAD OR WISHED YOU COULD GO TO SLEEP AND NOT WAKE UP?: NO
2. HAVE YOU ACTUALLY HAD ANY THOUGHTS OF KILLING YOURSELF IN THE PAST MONTH?: NO

## 2024-07-11 NOTE — ED TRIAGE NOTES
Pt referred from clinic due to an increased troponin. SOB on exertion and uses O2 at home. Pt denies any other symptoms.      Triage Assessment (Adult)       Row Name 07/11/24 [id number]          Triage Assessment    Airway WDL WDL        Respiratory WDL    Respiratory WDL WDL        Cardiac WDL    Cardiac WDL WDL

## 2024-07-11 NOTE — ED NOTES
[facility]  ED Nurse Handoff Report    ED Chief complaint: Abnormal Labs      ED Diagnosis:   Final diagnoses:   None       Code Status: To be determined by admitting MD.     Allergies:   Allergies   Allergen Reactions    Aspirin Hives     Reaction occurred during childhood.     Lidocaine Itching    Lisinopril Cough    Losartan      Hyperkalemia      Metformin      Elevated lactic acid    Minocycline      Yellow Dye Allergy. Minocycline has Yellow Dye #10.    Mounjaro [Tirzepatide] Diarrhea and GI Disturbance    Salicylates Hives    Yellow Dye Hives     Rxn to yellow tablet. Eyes swelled shut.     Yellow Dyes (Non-Tartrazine) Hives       Patient Story: Pt referred from clinic due to an increased troponin. SOB on exertion and uses O2 as needed at home. Pt denies chest pain or any other symptoms.   Focused Assessment:  A/Ox4. Skin warm and dry. Elevated BNP and creatinine.     Treatments and/or interventions provided:   XR Chest 2 Views    (Results Pending)     Labs Ordered and Resulted from Time of ED Arrival to Time of ED Departure   BASIC METABOLIC PANEL - Abnormal       Result Value    Sodium 138      Potassium 4.2      Chloride 100      Carbon Dioxide (CO2) 24      Anion Gap 14      Urea Nitrogen 58.0 (*)     Creatinine 2.50 (*)     GFR Estimate 18 (*)     Calcium 9.0      Glucose 169 (*)    TROPONIN T, HIGH SENSITIVITY - Abnormal    Troponin T, High Sensitivity 39 (*)    CBC WITH PLATELETS AND DIFFERENTIAL - Abnormal    WBC Count 10.2      RBC Count 4.85      Hemoglobin 12.7      Hematocrit 41.2      MCV 85      MCH 26.2 (*)     MCHC 30.8 (*)     RDW 17.5 (*)     Platelet Count 331      % Neutrophils 73      % Lymphocytes 15      % Monocytes 9      % Eosinophils 2      % Basophils 0      % Immature Granulocytes 1      NRBCs per 100 WBC 0      Absolute Neutrophils 7.5      Absolute Lymphocytes 1.6      Absolute Monocytes 0.9      Absolute Eosinophils 0.2      Absolute Basophils 0.0      Absolute  "Immature Granulocytes 0.1      Absolute NRBCs 0.0     NT PROBNP INPATIENT - Abnormal    N terminal Pro BNP Inpatient 14,392 (*)    RBC AND PLATELET MORPHOLOGY - Abnormal    RBC Morphology Confirmed RBC Indices      Platelet Assessment        Value: Automated Count Confirmed. Platelet morphology is normal.    Elliptocytes Slight (*)       Patient's response to treatments and/or interventions: Tolerated    To be done/followed up on inpatient unit:  Continue to monitor    Does this patient have any cognitive concerns?:  A/Ox4    Activity level - Baseline/Home:  Independent  Activity Level - Current:   Stand with Assist    Patient's Preferred language: English   Needed?: No    Isolation: None  Infection: Not Applicable  Patient tested for COVID 19 prior to admission: NO  Bariatric?: No    Vital Signs:   Vitals:    07/11/24 1611 07/11/24 1700 07/11/24 1707 07/11/24 1804   BP:  119/85  126/81   Pulse: 119 110 114 86   Resp:  20 14 19   Temp:       SpO2:  95% 96% 95%   Weight:   114.3 kg (252 lb)    Height:   1.702 m (5' 7\")        Cardiac Rhythm:Cardiac Rhythm: Atrial fibrillation    Was the PSS-3 completed:   Yes  What interventions are required if any?               Family Comments: Daughter-in-law at bedside.   OBS brochure/video discussed/provided to patient/family: N/A             For the majority of the shift this patient's behavior was Green.   Behavioral interventions performed were N/A.    ED NURSE PHONE NUMBER: [phone]        "

## 2024-07-11 NOTE — Clinical Note
Potential access sites were evaluated for patency using ultrasound.   The left subclavian ian was selected. Access was obtained under with Sonosite and Fluoroscopic guidance using a micropuncture 21 gauge needle with direct visualization of needle entry.

## 2024-07-11 NOTE — PROGRESS NOTES
HPI: Kristyn is a 89 yo female here alone for weakness  She has home care and they recd UA  Denies any urinary sxs  She uses oxygen at home  She has known Afib  Denies any chest pain  She didn't eat Saturday until last night  Home care recd UA    Past Medical History:   Diagnosis Date    Aortic valve sclerosis     heart murmur, no AS    Arrhythmia     PAT, PVC    Aspirin allergy     Plavix use long term    Asthma     CKD (chronic kidney disease) stage 3, GFR 30-59 ml/min (H)     x 2007 atleast    Congestive heart failure, unspecified     Depression     Diabetes mellitus (H) 2010    Diastolic dysfunction, left ventricle 2013    grade 2, nl ef    HTN (hypertension)     Lactic acidosis 08/2018    due to dehydration and metformin    Migraine headache     Mitral stenosis     mild, 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    Myocardial infarction (H) 9/2007, cath 2013 ml    BMS: stent to OM, diag, nl EF, echo /C angia 2013 , f/u cath no lesion >40%    Nephrolithiasis     right side    OA (osteoarthritis) of knee     Obesity     Rheumatoid arthritis flare (H)     prednisone    Sleep apnea     restarted using cpap 2017    TIA (transient ischaemic attack)     Ventral hernia, unspecified, without mention of obstruction or gangrene      Past Surgical History:   Procedure Laterality Date    APPENDECTOMY      BIOPSY BREAST      x2 -needle & lumpectomy-benign    CHOLECYSTECTOMY      CORONARY ANGIOGRAPHY ADULT ORDER  9/28/2007    Bare metal stent to OM1, Diagonal patent     CORONARY ANGIOGRAPHY ADULT ORDER  9/25/2007    [place] stent to Diagonal    HC LEFT HEART CATHETERIZATION  8/2013    Moderate CAD    HYSTERECTOMY TOTAL ABDOMINAL      ORTHOPEDIC SURGERY      knee replacement on right side (2006), Left side (2016)    RELEASE CARPAL TUNNEL      right and left    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     Social History     Tobacco Use    Smoking status: Former     Current packs/day: 0.00     Average packs/day: 0.3 packs/day for 1.3 years  (0.3 ttl pk-yrs)     Types: Cigarettes     Start date:      Quit date: 1973     Years since quittin.2    Smokeless tobacco: Never   Substance Use Topics    Alcohol use: Yes     Alcohol/week: 0.0 - 1.0 standard drinks of alcohol     Comment: rarely     Current Outpatient Medications   Medication Sig Dispense Refill    acetaminophen (TYLENOL) 500 MG tablet Take 1,000 mg by mouth 2 times daily      albuterol (PROAIR HFA/PROVENTIL HFA/VENTOLIN HFA) 108 (90 Base) MCG/ACT inhaler Inhale 2 puffs into the lungs every 6 hours as needed for shortness of breath, wheezing or cough For shortness of breath      apixaban ANTICOAGULANT (ELIQUIS) 2.5 MG tablet Take 1 tablet (2.5 mg) by mouth 2 times daily 180 tablet 3    atorvastatin (LIPITOR) 20 MG tablet Take 1 tablet (20 mg) by mouth daily for cholesterol 100 tablet 3    blood glucose (ACCU-CHEK GUIDE) test strip Use to test blood sugar once daily or as directed. 100 strip 3    blood glucose (ACCU-CHEK SOFTCLIX) lancing device Lancing device to be used with lancets. 1 each 0    blood glucose monitoring (SOFTCLIX) lancets Use to test blood sugar once daily. 100 each 3    buPROPion (WELLBUTRIN SR) 100 MG 12 hr tablet Take 1 tablet (100 mg) by mouth daily for mood 90 tablet 3    calcium carbonate (TUMS) 500 MG chewable tablet Take 1 chew tab by mouth 2 times daily as needed for heartburn      Cholecalciferol (VITAMIN D) 125 MCG (5000 UT) capsule Take 1 tablet by mouth daily      DULoxetine (CYMBALTA) 60 MG capsule Take 1 capsule (60 mg) by mouth daily 90 capsule 3    fexofenadine (ALLEGRA) 60 MG tablet Take 60 mg by mouth daily      fluticasone-salmeterol (WIXELA INHUB) 100-50 MCG/ACT inhaler USE 1 INHALATION BY MOUTH EVERY  12 HOURS 180 each 3    glimepiride (AMARYL) 2 MG tablet Take 1 tablet (2 mg) by mouth 2 times daily (before meals) 200 tablet 2    Menthol, Topical Analgesic, (ICY HOT MEDICATED SPRAY EX) Externally apply topically daily as needed (pain, in neck,  "back, hand)      metoprolol tartrate (LOPRESSOR) 25 MG tablet Take 3 tablets (75 mg) by mouth 2 times daily 540 tablet 1    miconazole (MICATIN) 2 % external cream Apply topically 2 times daily as needed for other (rash/irritation)      Multiple Vitamins-Minerals (HAIR SKIN AND NAILS FORMULA PO) Take 1 tablet by mouth daily      nitroGLYcerin (NITROSTAT) 0.4 MG sublingual tablet FOR CHEST PAIN PLACE 1 TABLET  UNDER TONGUE EVERY 5 MINUTES FOR 3 DOSES. IF SYMPTOMS PERSIST 5  MINUTES AFTER 1ST DOSE CALL 911 25 tablet 0    pantoprazole (PROTONIX) 20 MG EC tablet Take 1 tablet (20 mg) by mouth daily 90 tablet 3    torsemide (DEMADEX) 20 MG tablet Take 2 tablets (40 mg) by mouth daily 270 tablet 1    triamcinolone (KENALOG) 0.1 % external cream Apply topically 2 times daily as needed for irritation       Allergies   Allergen Reactions    Aspirin Hives     Reaction occurred during childhood.     Lidocaine Itching    Lisinopril Cough    Losartan      Hyperkalemia      Metformin      Elevated lactic acid    Minocycline      Yellow Dye Allergy. Minocycline has Yellow Dye #10.    Mounjaro [Tirzepatide] Diarrhea and GI Disturbance    Salicylates Hives    Yellow Dye Hives     Rxn to yellow tablet. Eyes swelled shut.     Yellow Dyes (Non-Tartrazine) Hives       PHYSICAL EXAM:    /66 (BP Location: Right arm, Patient Position: Sitting, Cuff Size: Adult Large)   Pulse 111   Temp 96.9  F (36.1  C) (Temporal)   Resp 18   Ht 1.702 m (5' 7\")   Wt 114.3 kg (252 lb)   SpO2 97%   BMI 39.47 kg/m      Patient appears obese with urine odor  Heart: irreg irreg  Uses walker for ambulation    Results for orders placed or performed in visit on 07/11/24   CBC with platelets     Status: Abnormal   Result Value Ref Range    WBC Count 10.1 4.0 - 11.0 10e3/uL    RBC Count 5.13 3.80 - 5.20 10e6/uL    Hemoglobin 13.0 11.7 - 15.7 g/dL    Hematocrit 43.4 35.0 - 47.0 %    MCV 85 78 - 100 fL    MCH 25.3 (L) 26.5 - 33.0 pg    MCHC 30.0 (L) 31.5 " - 36.5 g/dL    RDW 17.1 (H) 10.0 - 15.0 %    Platelet Count 336 150 - 450 10e3/uL   UA Macroscopic with reflex to Microscopic and Culture - Lab Collect     Status: Abnormal    Specimen: Urine, Midstream   Result Value Ref Range    Color Urine Yellow Colorless, Straw, Light Yellow, Yellow    Appearance Urine Cloudy (A) Clear    Glucose Urine Negative Negative mg/dL    Bilirubin Urine Negative Negative    Ketones Urine Negative Negative mg/dL    Specific Gravity Urine 1.015 1.003 - 1.035    Blood Urine Moderate (A) Negative    pH Urine 5.5 5.0 - 7.0    Protein Albumin Urine 100 (A) Negative mg/dL    Urobilinogen Urine 0.2 0.2, 1.0 E.U./dL    Nitrite Urine Negative Negative    Leukocyte Esterase Urine Large (A) Negative   Troponin T, High Sensitivity     Status: Abnormal   Result Value Ref Range    Troponin T, High Sensitivity 45 (H) <=14 ng/L   Urine Microscopic Exam     Status: Abnormal   Result Value Ref Range    Bacteria Urine Many (A) None Seen /HPF    RBC Urine 0-2 0-2 /HPF /HPF    WBC Urine >100 (A) 0-5 /HPF /HPF    Squamous Epithelials Urine Few (A) None Seen /LPF       Assessment and Plan:     (N39.0) Urinary tract infection without hematuria, site unspecified  (primary encounter diagnosis)  Comment:   Plan: cephALEXin (KEFLEX) 500 MG capsule        BID x 7d    (R06.02) SOB (shortness of breath)  Comment:   Plan: Troponin T, High Sensitivity            (M62.81) Generalized muscle weakness  Comment:   Plan: CBC with platelets, UA Macroscopic with reflex         to Microscopic and Culture - Lab Collect, Urine        Microscopic Exam, Urine Culture            (J44.9) Chronic obstructive pulmonary disease, unspecified COPD type (H)  Comment:   Plan: uses oxygen prn at home    (R79.89) Elevated troponin  Comment: Unable to reach pt by phone so did call her dtr who agreed to bring her to ER for further evaluation  Plan:  Pt to go to ED today      Helen Guerra PA-C

## 2024-07-12 ENCOUNTER — APPOINTMENT (OUTPATIENT)
Dept: OCCUPATIONAL THERAPY | Facility: CLINIC | Age: 89
DRG: 242 | End: 2024-07-12
Attending: INTERNAL MEDICINE
Payer: COMMERCIAL

## 2024-07-12 ENCOUNTER — APPOINTMENT (OUTPATIENT)
Dept: PHYSICAL THERAPY | Facility: CLINIC | Age: 89
DRG: 242 | End: 2024-07-12
Attending: INTERNAL MEDICINE
Payer: COMMERCIAL

## 2024-07-12 ENCOUNTER — PATIENT OUTREACH (OUTPATIENT)
Dept: CARE COORDINATION | Facility: CLINIC | Age: 89
End: 2024-07-12

## 2024-07-12 LAB
ANION GAP SERPL CALCULATED.3IONS-SCNC: 14 MMOL/L (ref 7–15)
BACTERIA UR CULT: ABNORMAL
BUN SERPL-MCNC: 54.6 MG/DL (ref 8–23)
CALCIUM SERPL-MCNC: 9 MG/DL (ref 8.2–9.6)
CHLORIDE SERPL-SCNC: 102 MMOL/L (ref 98–107)
CREAT SERPL-MCNC: 2.35 MG/DL (ref 0.51–0.95)
CREAT SERPL-MCNC: 2.5 MG/DL (ref 0.51–0.95)
CREAT UR-MCNC: 56.8 MG/DL
DEPRECATED HCO3 PLAS-SCNC: 24 MMOL/L (ref 22–29)
EGFRCR SERPLBLD CKD-EPI 2021: 19 ML/MIN/1.73M2
ERYTHROCYTE [DISTWIDTH] IN BLOOD BY AUTOMATED COUNT: 17.3 % (ref 10–15)
FRACT EXCRET NA UR+SERPL-RTO: 2.1 %
GLUCOSE BLDC GLUCOMTR-MCNC: 113 MG/DL (ref 70–99)
GLUCOSE BLDC GLUCOMTR-MCNC: 118 MG/DL (ref 70–99)
GLUCOSE BLDC GLUCOMTR-MCNC: 118 MG/DL (ref 70–99)
GLUCOSE BLDC GLUCOMTR-MCNC: 139 MG/DL (ref 70–99)
GLUCOSE SERPL-MCNC: 124 MG/DL (ref 70–99)
HCT VFR BLD AUTO: 37.7 % (ref 35–47)
HGB BLD-MCNC: 11.8 G/DL (ref 11.7–15.7)
LACTATE SERPL-SCNC: 1.2 MMOL/L (ref 0.7–2)
LACTATE SERPL-SCNC: 1.8 MMOL/L (ref 0.7–2)
MCH RBC QN AUTO: 26.4 PG (ref 26.5–33)
MCHC RBC AUTO-ENTMCNC: 31.3 G/DL (ref 31.5–36.5)
MCV RBC AUTO: 84 FL (ref 78–100)
PLATELET # BLD AUTO: 328 10E3/UL (ref 150–450)
POTASSIUM SERPL-SCNC: 4.3 MMOL/L (ref 3.4–5.3)
RBC # BLD AUTO: 4.47 10E6/UL (ref 3.8–5.2)
SODIUM SERPL-SCNC: 140 MMOL/L (ref 135–145)
SODIUM SERPL-SCNC: 140 MMOL/L (ref 135–145)
SODIUM UR-SCNC: 66 MMOL/L
TROPONIN T SERPL HS-MCNC: 36 NG/L
TROPONIN T SERPL HS-MCNC: 41 NG/L
WBC # BLD AUTO: 9.8 10E3/UL (ref 4–11)

## 2024-07-12 PROCEDURE — C1733 CATH, EP, OTHR THAN COOL-TIP: HCPCS | Performed by: INTERNAL MEDICINE

## 2024-07-12 PROCEDURE — 84295 ASSAY OF SERUM SODIUM: CPT | Performed by: INTERNAL MEDICINE

## 2024-07-12 PROCEDURE — 82565 ASSAY OF CREATININE: CPT | Performed by: INTERNAL MEDICINE

## 2024-07-12 PROCEDURE — C1894 INTRO/SHEATH, NON-LASER: HCPCS | Performed by: INTERNAL MEDICINE

## 2024-07-12 PROCEDURE — 250N000009 HC RX 250: Performed by: INTERNAL MEDICINE

## 2024-07-12 PROCEDURE — 97530 THERAPEUTIC ACTIVITIES: CPT | Mod: GP

## 2024-07-12 PROCEDURE — 250N000011 HC RX IP 250 OP 636: Performed by: INTERNAL MEDICINE

## 2024-07-12 PROCEDURE — 33207 INSERT HEART PM VENTRICULAR: CPT | Mod: KX | Performed by: INTERNAL MEDICINE

## 2024-07-12 PROCEDURE — 250N000011 HC RX IP 250 OP 636: Performed by: EMERGENCY MEDICINE

## 2024-07-12 PROCEDURE — 0JH634Z INSERTION OF PACEMAKER, SINGLE CHAMBER INTO CHEST SUBCUTANEOUS TISSUE AND FASCIA, PERCUTANEOUS APPROACH: ICD-10-PCS | Performed by: INTERNAL MEDICINE

## 2024-07-12 PROCEDURE — 84484 ASSAY OF TROPONIN QUANT: CPT | Performed by: INTERNAL MEDICINE

## 2024-07-12 PROCEDURE — 84300 ASSAY OF URINE SODIUM: CPT | Performed by: INTERNAL MEDICINE

## 2024-07-12 PROCEDURE — 97161 PT EVAL LOW COMPLEX 20 MIN: CPT | Mod: GP

## 2024-07-12 PROCEDURE — 97165 OT EVAL LOW COMPLEX 30 MIN: CPT | Mod: GO

## 2024-07-12 PROCEDURE — 02HK3JZ INSERTION OF PACEMAKER LEAD INTO RIGHT VENTRICLE, PERCUTANEOUS APPROACH: ICD-10-PCS | Performed by: INTERNAL MEDICINE

## 2024-07-12 PROCEDURE — 99222 1ST HOSP IP/OBS MODERATE 55: CPT | Mod: 24 | Performed by: INTERNAL MEDICINE

## 2024-07-12 PROCEDURE — 97535 SELF CARE MNGMENT TRAINING: CPT | Mod: GO

## 2024-07-12 PROCEDURE — 85027 COMPLETE CBC AUTOMATED: CPT | Performed by: INTERNAL MEDICINE

## 2024-07-12 PROCEDURE — C1786 PMKR, SINGLE, RATE-RESP: HCPCS | Performed by: INTERNAL MEDICINE

## 2024-07-12 PROCEDURE — 97116 GAIT TRAINING THERAPY: CPT | Mod: GP

## 2024-07-12 PROCEDURE — 210N000002 HC R&B HEART CARE

## 2024-07-12 PROCEDURE — 36415 COLL VENOUS BLD VENIPUNCTURE: CPT | Performed by: INTERNAL MEDICINE

## 2024-07-12 PROCEDURE — 99233 SBSQ HOSP IP/OBS HIGH 50: CPT | Performed by: STUDENT IN AN ORGANIZED HEALTH CARE EDUCATION/TRAINING PROGRAM

## 2024-07-12 PROCEDURE — 83605 ASSAY OF LACTIC ACID: CPT | Performed by: INTERNAL MEDICINE

## 2024-07-12 PROCEDURE — 250N000013 HC RX MED GY IP 250 OP 250 PS 637: Performed by: INTERNAL MEDICINE

## 2024-07-12 PROCEDURE — C1887 CATHETER, GUIDING: HCPCS | Performed by: INTERNAL MEDICINE

## 2024-07-12 PROCEDURE — 02583ZZ DESTRUCTION OF CONDUCTION MECHANISM, PERCUTANEOUS APPROACH: ICD-10-PCS | Performed by: INTERNAL MEDICINE

## 2024-07-12 PROCEDURE — 258N000003 HC RX IP 258 OP 636: Performed by: INTERNAL MEDICINE

## 2024-07-12 PROCEDURE — 93650 ICAR CATH ABLTJ AV NODE FUNC: CPT | Performed by: INTERNAL MEDICINE

## 2024-07-12 PROCEDURE — 272N000001 HC OR GENERAL SUPPLY STERILE: Performed by: INTERNAL MEDICINE

## 2024-07-12 PROCEDURE — C1898 LEAD, PMKR, OTHER THAN TRANS: HCPCS | Performed by: INTERNAL MEDICINE

## 2024-07-12 DEVICE — LEAD PACEMAKER SELECTSECURE 69CM MD  383069: Type: IMPLANTABLE DEVICE | Status: FUNCTIONAL

## 2024-07-12 DEVICE — PACEMAKER AZURE XT SR MRI SYS: Type: IMPLANTABLE DEVICE | Status: FUNCTIONAL

## 2024-07-12 RX ORDER — NALOXONE HYDROCHLORIDE 0.4 MG/ML
0.2 INJECTION, SOLUTION INTRAMUSCULAR; INTRAVENOUS; SUBCUTANEOUS
Status: DISCONTINUED | OUTPATIENT
Start: 2024-07-12 | End: 2024-07-14 | Stop reason: HOSPADM

## 2024-07-12 RX ORDER — CEFAZOLIN SODIUM 2 G/100ML
2 INJECTION, SOLUTION INTRAVENOUS
Status: COMPLETED | OUTPATIENT
Start: 2024-07-12 | End: 2024-07-12

## 2024-07-12 RX ORDER — NALOXONE HYDROCHLORIDE 0.4 MG/ML
0.4 INJECTION, SOLUTION INTRAMUSCULAR; INTRAVENOUS; SUBCUTANEOUS
Status: DISCONTINUED | OUTPATIENT
Start: 2024-07-12 | End: 2024-07-14 | Stop reason: HOSPADM

## 2024-07-12 RX ORDER — SODIUM CHLORIDE 450 MG/100ML
INJECTION, SOLUTION INTRAVENOUS CONTINUOUS
Status: DISCONTINUED | OUTPATIENT
Start: 2024-07-12 | End: 2024-07-12 | Stop reason: HOSPADM

## 2024-07-12 RX ORDER — DEXTROSE MONOHYDRATE 25 G/50ML
25-50 INJECTION, SOLUTION INTRAVENOUS
Status: DISCONTINUED | OUTPATIENT
Start: 2024-07-12 | End: 2024-07-14 | Stop reason: HOSPADM

## 2024-07-12 RX ORDER — SODIUM CHLORIDE, SODIUM LACTATE, POTASSIUM CHLORIDE, CALCIUM CHLORIDE 600; 310; 30; 20 MG/100ML; MG/100ML; MG/100ML; MG/100ML
INJECTION, SOLUTION INTRAVENOUS CONTINUOUS
Status: DISCONTINUED | OUTPATIENT
Start: 2024-07-12 | End: 2024-07-12 | Stop reason: HOSPADM

## 2024-07-12 RX ORDER — BUPIVACAINE HYDROCHLORIDE 2.5 MG/ML
INJECTION, SOLUTION EPIDURAL; INFILTRATION; INTRACAUDAL
Status: DISCONTINUED | OUTPATIENT
Start: 2024-07-12 | End: 2024-07-12 | Stop reason: HOSPADM

## 2024-07-12 RX ORDER — OXYCODONE AND ACETAMINOPHEN 5; 325 MG/1; MG/1
1 TABLET ORAL EVERY 4 HOURS PRN
Status: DISCONTINUED | OUTPATIENT
Start: 2024-07-12 | End: 2024-07-14 | Stop reason: HOSPADM

## 2024-07-12 RX ORDER — ONDANSETRON 2 MG/ML
INJECTION INTRAMUSCULAR; INTRAVENOUS
Status: DISCONTINUED | OUTPATIENT
Start: 2024-07-12 | End: 2024-07-12 | Stop reason: HOSPADM

## 2024-07-12 RX ORDER — NICOTINE POLACRILEX 4 MG
15-30 LOZENGE BUCCAL
Status: DISCONTINUED | OUTPATIENT
Start: 2024-07-12 | End: 2024-07-14 | Stop reason: HOSPADM

## 2024-07-12 RX ADMIN — DULOXETINE HYDROCHLORIDE 60 MG: 60 CAPSULE, DELAYED RELEASE ORAL at 09:15

## 2024-07-12 RX ADMIN — MICONAZOLE NITRATE: 2 POWDER TOPICAL at 20:17

## 2024-07-12 RX ADMIN — CEFAZOLIN SODIUM 2 G: 2 INJECTION, SOLUTION INTRAVENOUS at 11:43

## 2024-07-12 RX ADMIN — CEFTRIAXONE SODIUM 1 G: 1 INJECTION, POWDER, FOR SOLUTION INTRAMUSCULAR; INTRAVENOUS at 00:37

## 2024-07-12 RX ADMIN — FLUTICASONE FUROATE AND VILANTEROL TRIFENATATE 1 PUFF: 100; 25 POWDER RESPIRATORY (INHALATION) at 09:16

## 2024-07-12 RX ADMIN — METOPROLOL TARTRATE 75 MG: 50 TABLET, FILM COATED ORAL at 09:15

## 2024-07-12 RX ADMIN — METOPROLOL TARTRATE 75 MG: 50 TABLET, FILM COATED ORAL at 00:36

## 2024-07-12 RX ADMIN — APIXABAN 2.5 MG: 2.5 TABLET, FILM COATED ORAL at 20:17

## 2024-07-12 RX ADMIN — FUROSEMIDE 40 MG: 10 INJECTION, SOLUTION INTRAMUSCULAR; INTRAVENOUS at 00:35

## 2024-07-12 RX ADMIN — ATORVASTATIN CALCIUM 20 MG: 20 TABLET, FILM COATED ORAL at 09:15

## 2024-07-12 RX ADMIN — Medication 1 TABLET: at 09:15

## 2024-07-12 RX ADMIN — BUPROPION HYDROCHLORIDE 100 MG: 100 TABLET, FILM COATED, EXTENDED RELEASE ORAL at 09:21

## 2024-07-12 RX ADMIN — MICONAZOLE NITRATE: 2 POWDER TOPICAL at 09:16

## 2024-07-12 RX ADMIN — METOPROLOL TARTRATE 75 MG: 50 TABLET, FILM COATED ORAL at 20:17

## 2024-07-12 RX ADMIN — SODIUM CHLORIDE, POTASSIUM CHLORIDE, SODIUM LACTATE AND CALCIUM CHLORIDE 30 ML/HR: 600; 310; 30; 20 INJECTION, SOLUTION INTRAVENOUS at 11:27

## 2024-07-12 RX ADMIN — FEXOFENADINE HYDROCHLORIDE 60 MG: 60 TABLET ORAL at 09:21

## 2024-07-12 RX ADMIN — ACETAMINOPHEN 1000 MG: 500 TABLET, FILM COATED ORAL at 09:15

## 2024-07-12 RX ADMIN — APIXABAN 2.5 MG: 2.5 TABLET, FILM COATED ORAL at 09:15

## 2024-07-12 RX ADMIN — APIXABAN 2.5 MG: 2.5 TABLET, FILM COATED ORAL at 00:36

## 2024-07-12 RX ADMIN — ACETAMINOPHEN 1000 MG: 500 TABLET, FILM COATED ORAL at 20:17

## 2024-07-12 RX ADMIN — ACETAMINOPHEN 1000 MG: 500 TABLET, FILM COATED ORAL at 00:36

## 2024-07-12 RX ADMIN — PANTOPRAZOLE SODIUM 20 MG: 20 TABLET, DELAYED RELEASE ORAL at 09:15

## 2024-07-12 ASSESSMENT — ACTIVITIES OF DAILY LIVING (ADL)
ADLS_ACUITY_SCORE: 34
ADLS_ACUITY_SCORE: 32
ADLS_ACUITY_SCORE: 36
ADLS_ACUITY_SCORE: 36
ADLS_ACUITY_SCORE: 34
ADLS_ACUITY_SCORE: 36
ADLS_ACUITY_SCORE: 36
ADLS_ACUITY_SCORE: 34
ADLS_ACUITY_SCORE: 34
PREVIOUS_RESPONSIBILITIES: MEAL PREP;HOUSEKEEPING;LAUNDRY;SHOPPING;MEDICATION MANAGEMENT;FINANCES;DRIVING
ADLS_ACUITY_SCORE: 30
ADLS_ACUITY_SCORE: 36
ADLS_ACUITY_SCORE: 34
ADLS_ACUITY_SCORE: 32
ADLS_ACUITY_SCORE: 34
ADLS_ACUITY_SCORE: 32
ADLS_ACUITY_SCORE: 36
ADLS_ACUITY_SCORE: 32
ADLS_ACUITY_SCORE: 34
DEPENDENT_IADLS:: CLEANING;SHOPPING

## 2024-07-12 NOTE — PROGRESS NOTES
RECEIVING UNIT ED HANDOFF REVIEW    ED Nurse Handoff Report was reviewed by: April Oneal RN on July 11, 2024 at 10:36 PM

## 2024-07-12 NOTE — PHARMACY-ADMISSION MEDICATION HISTORY
Pharmacist Admission Medication History    Admission medication history is complete. The information provided in this note is only as accurate as the sources available at the time of the update.    Information Source(s): Patient and CareEverywhere/SureScripts via in-person    Pertinent Information: Patient has questionable compliance with medications. Unsure if she's taking glimepiride once or twice daily. She uses Wixela inhaler once daily. She notes she was told to take fexofenadine 60 mg daily but cannot find this tablet strength at the store.    Changes made to PTA medication list:  Added: None  Deleted: None  Changed: None    Allergies reviewed with patient and updates made in EHR: yes    Medication History Completed By: Angelina Guevara [facility] 7/11/2024 9:54 PM    PTA Med List   Medication Sig Last Dose    acetaminophen (TYLENOL) 500 MG tablet Take 1,000 mg by mouth 2 times daily 7/10/2024    albuterol (PROAIR HFA/PROVENTIL HFA/VENTOLIN HFA) 108 (90 Base) MCG/ACT inhaler Inhale 2 puffs into the lungs every 6 hours as needed for shortness of breath, wheezing or cough For shortness of breath  at prn    apixaban ANTICOAGULANT (ELIQUIS) 2.5 MG tablet Take 1 tablet (2.5 mg) by mouth 2 times daily 7/10/2024    atorvastatin (LIPITOR) 20 MG tablet Take 1 tablet (20 mg) by mouth daily for cholesterol 7/10/2024    buPROPion (WELLBUTRIN SR) 100 MG 12 hr tablet Take 1 tablet (100 mg) by mouth daily for mood 7/10/2024 at pm    calcium carbonate (TUMS) 500 MG chewable tablet Take 1 chew tab by mouth 2 times daily as needed for heartburn  at prn    cephALEXin (KEFLEX) 500 MG capsule Take 1 capsule (500 mg) by mouth 2 times daily 7/11/2024 at am - took 1 dose    Cholecalciferol (VITAMIN D) 125 MCG (5000 UT) capsule Take 1 tablet by mouth daily 7/10/2024 at pm    DULoxetine (CYMBALTA) 60 MG capsule Take 1 capsule (60 mg) by mouth daily 7/10/2024 at pm    fexofenadine (ALLEGRA) 60 MG tablet Take 60 mg by mouth daily Unknown at  has not been able to find OTC    fluticasone-salmeterol (WIXELA INHUB) 100-50 MCG/ACT inhaler USE 1 INHALATION BY MOUTH EVERY  12 HOURS 7/10/2024    glimepiride (AMARYL) 2 MG tablet Take 1 tablet (2 mg) by mouth 2 times daily (before meals) 7/10/2024    Menthol, Topical Analgesic, (ICY HOT MEDICATED SPRAY EX) Externally apply topically daily as needed (pain, in neck, back, hand)  at prn    metoprolol tartrate (LOPRESSOR) 25 MG tablet Take 3 tablets (75 mg) by mouth 2 times daily 7/10/2024    miconazole (MICATIN) 2 % external cream Apply topically 2 times daily as needed for other (rash/irritation)  at prn    Multiple Vitamins-Minerals (HAIR SKIN AND NAILS FORMULA PO) Take 1 tablet by mouth daily 7/10/2024    nitroGLYcerin (NITROSTAT) 0.4 MG sublingual tablet FOR CHEST PAIN PLACE 1 TABLET  UNDER TONGUE EVERY 5 MINUTES FOR 3 DOSES. IF SYMPTOMS PERSIST 5  MINUTES AFTER 1ST DOSE CALL 911  at prn    pantoprazole (PROTONIX) 20 MG EC tablet Take 1 tablet (20 mg) by mouth daily 7/10/2024    torsemide (DEMADEX) 20 MG tablet Take 2 tablets (40 mg) by mouth daily (Patient taking differently: Take 20 mg by mouth 2 times daily) 7/10/2024    triamcinolone (KENALOG) 0.1 % external cream Apply topically 2 times daily as needed for irritation  at prn

## 2024-07-12 NOTE — PLAN OF CARE
Goal Outcome Evaluation:       [id number]    Pt arrived from ED @ 2340. A&O x 4, forgetful at times. Pt denied pain. VSS, on RA- declined to use CPAP overnight. LS diminished, does not report SOB but HENDERSON noted. Getting 40 mg IV lasix, good UOP tonight >1000mL . Purewick in placed, intact overnight. Trending trops, denies CP. IV axb. Up w/ A1 GB+Walker. Tele Afib CVR. Has rash, in breast, abdominal folds - PRN miconazole powder ordered. Open abrasion on R perineum fold. Alarms on. Plan for Continued diureses, Cards consult ECHO AM. Continue with plan of care.     Heart Failure Care Map  GOALS TO BE MET BEFORE DISCHARGE:    1. Decrease congestion and/or edema with diuretic therapy to achieve near optimal volume status.     Dyspnea improved: No, further care required to meet this goal. Please explain HENDERSON Noted   Edema improved:+2 BLE edema         Last 24 hour I/O:   Intake/Output Summary (Last 24 hours) at 7/12/2024 0636  Last data filed at 7/12/2024 0500  Gross per 24 hour   Intake 690 ml   Output 1100 ml   Net -410 ml           Net I/O and Weights since admission:   06/12 0700 - 07/12 0659  In: 690 [P.O.:690]  Out: 1100 [Urine:1100]  Net: -410     Vitals:    07/11/24 1707 07/11/24 2342 07/12/24 0519   Weight: 114.3 kg (252 lb) 114.9 kg (253 lb 3.2 oz) 113.4 kg (249 lb 14.4 oz)       2.  O2 sats > 90% on room air, or at prior home O2 therapy level.      Able to wean O2 this shift to keep sats above 90%?: Yes, satisfactory for discharge.   Does patient use Home O2? Yes-            Current oxygenation status:   SpO2: 91 %     O2 Device: None (Room air),      3.  Tolerates ambulation and mobility near baseline.     Ambulation: Yes, satisfactory for discharge.   Times patient ambulated with staff this shift: 4    Please review the Heart Failure Care Map for additional HF goal outcomes.    April Oneal RN  7/12/2024

## 2024-07-12 NOTE — PROGRESS NOTES
07/12/24 0735   Appointment Info   Signing Clinician's Name / Credentials (OT) Jonny Cotto OTR/L   Living Environment   People in Home alone   Current Living Arrangements apartment   Home Accessibility stairs to enter home   Number of Stairs, Main Entrance 1   Transportation Anticipated family or friend will provide  (Pt typically drives)   Living Environment Comments Has home therapies and home RN.   Self-Care   Usual Activity Tolerance moderate   Current Activity Tolerance fair   Equipment Currently Used at Home walker, rolling;shower chair;grab bar, toilet;grab bar, tub/shower   Fall history within last six months yes   Number of times patient has fallen within last six months 4   Activity/Exercise/Self-Care Comment Independent in all ADLs at baseline. Uses a 4WW, pt reported that she should use it all the time, however will occasionally not use it in the home.   Instrumental Activities of Daily Living (IADL)   Previous Responsibilities meal prep;housekeeping;laundry;shopping;medication management;finances;driving   General Information   Onset of Illness/Injury or Date of Surgery 07/11/24   Referring Physician Sy Grimaldo MD   Patient/Family Therapy Goal Statement (OT) Home   Additional Occupational Profile Info/Pertinent History of Current Problem PEr chart: Moira Andre is a 89 year old female who has history of PAF on anticoagulation, Chronic diastolic heart failure, NYHA class II, ELIGIO wears CPAP, CAD with PCI to OM/Dx in 2007, mild-mod Aortic stenosis, moderate mitral stenosis, Hyperlipidemia, Stage III CKD, Obesity, DM, RA, OA, Depression who presents with weakness.  She was diagnosed with a UTI and has been suppose to take keflex.  Her Troponin were elevated to 45, WBC is 10.1, hgb 13, EKG shows atrial fibrillation with resting HR of 111 in clinic. She was diagnosed with UTI at her clinic.  CXR revealed a full reticular interstitial pattern throughout the lungs  concerning for pulmonary  edema and CHF. See chart for details.   Cognitive Status Examination   Orientation Status orientation to person, place and time   Safety Deficit at risk behavior observed;awareness of need for assistance;impulsivity;insight into deficits/self-awareness;judgment;problem-solving   Transfers   Transfers bed-chair transfer;sit-stand transfer;toilet transfer   Transfer Skill: Bed to Chair/Chair to Bed   Bed-Chair [place] (Transfers) set up;verbal cues;contact guard   Sit-Stand Transfer   Sit-Stand [place] (Transfers) set up;verbal cues;contact guard   Toilet Transfer   Type (Toilet Transfer) sit-stand;stand-sit   [place] Level (Toilet Transfer) set up;verbal cues;contact guard   Activities of Daily Living   BADL Assessment/Intervention lower body dressing   Additional Documentation Comment, BADL Assessment/Training (Row)   Lower Body Dressing Assessment/Training   [place] Level (Lower Body Dressing) doff;don;set up;verbal cues;contact guard assist   Clinical Impression   Criteria for Skilled Therapeutic Interventions Met (OT) Yes, treatment indicated   OT Diagnosis Decreased independence in I/ADLs   Influenced by the following impairments Decreased independence in I/ADLs   OT Problem List-Impairments impacting ADL problems related to;activity tolerance impaired;balance;cognition;strength   Assessment of Occupational Performance 1-3 Performance Deficits   Identified Performance Deficits Decreased independence in I/ADLs (Dressing, bathing, toileting)   Planned Therapy Interventions (OT) ADL retraining;IADL retraining;cognition;strengthening;transfer training;home program guidelines;progressive activity/exercise   Clinical Decision Making Complexity (OT) problem focused assessment/low complexity   Risk & Benefits of therapy have been explained evaluation/treatment results reviewed;care plan/treatment goals reviewed;risks/benefits reviewed;patient   OT Total Evaluation Time   OT Eval, Low Complexity  Minutes (19340) 7   OT Goals   Therapy Frequency (OT) Daily   OT Predicted Duration/Target Date for Goal Attainment 07/18/24   OT Goals Hygiene/Grooming;Upper Body Dressing;Lower Body Dressing;Toilet Transfer/Toileting;Cognition;Home Management;Cardiac Phase 1   OT: Hygiene/Grooming modified independent;while standing   OT: Upper Body Dressing Modified independent;including set-up/clothing retrieval   OT: Lower Body Dressing Modified independent;including set-up/clothing retrieval   OT: Toilet Transfer/Toileting Modified independent;toilet transfer;cleaning and garment management   OT: Home Management Modified independent;with light demand household tasks   OT: Cognitive Patient/caregiver will verbalize understanding of cognitive assessment results/recommendations as needed for safe discharge planning   OT: Understanding of cardiac education to maximize quality of life, condition management, and health outcomes Patient;Caregiver;Verbalize;Demonstrate   OT: Perform aerobic activity with stable cardiovascular response continuous;10 minutes  (To increase activity tolerance for I/ADLs.)   Interventions   Interventions Quick Adds Self-Care/Home Management   Self-Care/Home Management   Self-Care/Home Mgmt/ADL, Compensatory, Meal Prep Minutes (26255) 23   Symptoms Noted During/After Treatment (Meal Preparation/Planning Training) fatigue;shortness of breath   Treatment Detail/Skilled Intervention Educated on LE dressing with compensatory techniques. While seated/standing, pt completed LE dressing (don/doff underwear) with CGA, set up, and extra time. Pt ambulated to the bathroom with 4WW and CGA for toilet transfer, impulsive with functional mobility. Pt completed toilet transfer with CGA and 2 verbal cues for safe hand placement. While standing at the sink with CGA, pt completed 1 hygiene task with CGA and set up, pt easily fatigued and SOB with activity. Alarm on.   OT Discharge Planning   OT Plan Cognitive screen (check  to see what pt has done in the past); TB dress with retireval; G/H sinkside.   OT Discharge Recommendation (DC Rec) Transitional Care Facility   OT Rationale for DC Rec At baseline, pt lives independently and pt reported IND in all ADLs and IADLs. At this time, pt limited by decreased safety, decreased independence and activity tolerance in I/ADLs. TCU to address safety and IND in I/ADLs. Pt declines TCU. If home, then assist in all I/ADLs and resume home therapies.   Total Session Time   Timed Code Treatment Minutes 23   Total Session Time (sum of timed and untimed services) 30

## 2024-07-12 NOTE — PROGRESS NOTES
Uneventful implantation of a single chamber ppm and AVN ablation. Remove suture from right groin after 2 hours of bedrest.

## 2024-07-12 NOTE — PROGRESS NOTES
Clinic Care Coordination Contact  Ambulatory Care Coordination to Inpatient Care Management   Hand-In Communication    Date:  July 12, 2024  Name: Moira Andre is enrolled in Ambulatory Care Coordination program and I am the Lead Care Coordinator.  CC Contact Information: Epic InAngkor Residencessket + phone  Payor Source: Payor: [facility] / Plan: [employer] MEDICARE ADVANTAGE / Product Type: HMO /   Current services in place:     Please see the CC Snaphot and Care Management Flowsheets for specific details of this Moira Andre care plan.   Additional details/specific concerns r/t this admission:    No additional concerns at this time      I will follow this admission in Epic. Please feel free to contact me with questions or for further collaboration in discharge planning.    Maria L Mckeon,  [facility]  Clinic Care Coordinator  [facility]  [phone]  [email or web address]

## 2024-07-12 NOTE — PROGRESS NOTES
[facility]    Patient is currently receiving services with [facility]. The patient is currently receiving RN and PT services.  Patient's  and home health team have been notified that patient is under inpatient status. [facility] Liaison will continue to follow patient during stay. Please provide orders to resume home care at time of discharge if appropriate.

## 2024-07-12 NOTE — CONSULTS
[facility]    Cardiac Electrophysiology Consultation     Date of Admission:  7/11/2024  Date of Consult (When I saw the patient): 07/12/24    Assessment & Plan   Moira Andre is a 89 year old female who was admitted on 7/11/2024. I was asked to see the patient for AF. Delightful pt with persistent AF who has been hsp several times past year for AF with RVR a/w CHFpEF. Rate has been quite difficult to control. Echo done last April shows normal LVEF.     Recurrent CHFpEF due to persistent AF with RVR refractory to medical rx. Recommending AVN ablation and ppm. Discussed risks of the procedure including but not limited to vascular injury and infection.    Davion Chavarria MD    Code Status    Full Code    Primary Care Physician   Maryan Churchill    History is obtained from the patient    Past Medical History   I have reviewed this patient's medical history and updated it with pertinent information if needed.   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 Taking?   Cholecalciferol (VITAMIN D) 125 MCG (5000 UT) capsule 7/10/2024 at pm Self Yes Yes   Sig: Take 1 tablet by mouth daily   DULoxetine (CYMBALTA) 60 MG capsule 7/10/2024 at pm Self No Yes   Sig: Take 1 capsule (60 mg) by mouth daily   Menthol, Topical Analgesic, (ICY HOT MEDICATED SPRAY EX)  at prn Self Yes Yes   Sig: Externally apply topically daily as needed (pain, in neck, back, hand)   Multiple Vitamins-Minerals (HAIR SKIN AND NAILS FORMULA PO) 7/10/2024 Self Yes Yes   Sig: Take 1 tablet by mouth daily   acetaminophen (TYLENOL) 500 MG tablet 7/10/2024 Self Yes Yes   Sig: Take 1,000 mg by mouth 2 times daily   albuterol (PROAIR HFA/PROVENTIL HFA/VENTOLIN HFA) 108 (90 Base) MCG/ACT inhaler  at prn Self Yes Yes   Sig: Inhale 2 puffs into the lungs every 6 hours as needed for shortness of breath, wheezing or cough For shortness of breath   apixaban ANTICOAGULANT (ELIQUIS) 2.5 MG tablet 7/10/2024 Self No Yes   Sig: Take 1 tablet (2.5 mg) by mouth 2 times daily   atorvastatin (LIPITOR) 20 MG tablet 7/10/2024 Self No Yes   Sig: Take 1 tablet (20 mg) by mouth daily for cholesterol   blood glucose (ACCU-CHEK GUIDE)  test strip   No No   Sig: Use to test blood sugar once daily or as directed.   blood glucose (ACCU-CHEK SOFTCLIX) lancing device   No No   Sig: Lancing device to be used with lancets.   blood glucose monitoring (SOFTCLIX) lancets   No No   Sig: Use to test blood sugar once daily.   buPROPion (WELLBUTRIN SR) 100 MG 12 hr tablet 7/10/2024 at pm Self No Yes   Sig: Take 1 tablet (100 mg) by mouth daily for mood   calcium carbonate (TUMS) 500 MG chewable tablet  at prn Self Yes Yes   Sig: Take 1 chew tab by mouth 2 times daily as needed for heartburn   cephALEXin (KEFLEX) 500 MG capsule 7/11/2024 at am - took 1 dose  No Yes   Sig: Take 1 capsule (500 mg) by mouth 2 times daily   fexofenadine (ALLEGRA) 60 MG tablet Unknown at has not been able to find OTC Self Yes Yes   Sig: Take 60 mg by mouth daily   fluticasone-salmeterol (WIXELA INHUB) 100-50 MCG/ACT inhaler 7/10/2024 Self No Yes   Sig: USE 1 INHALATION BY MOUTH EVERY  12 HOURS   Patient taking differently: Inhale 1 puff into the lungs daily USE 1 INHALATION BY MOUTH EVERY  12 HOURS   glimepiride (AMARYL) 2 MG tablet 7/10/2024 Self No Yes   Sig: Take 1 tablet (2 mg) by mouth 2 times daily (before meals)   metoprolol tartrate (LOPRESSOR) 25 MG tablet 7/10/2024 Self No Yes   Sig: Take 3 tablets (75 mg) by mouth 2 times daily   miconazole (MICATIN) 2 % external cream  at prn  Yes Yes   Sig: Apply topically 2 times daily as needed for other (rash/irritation)   nitroGLYcerin (NITROSTAT) 0.4 MG sublingual tablet  at prn Self No Yes   Sig: FOR CHEST PAIN PLACE 1 TABLET  UNDER TONGUE EVERY 5 MINUTES FOR 3 DOSES. IF SYMPTOMS PERSIST 5  MINUTES AFTER 1ST DOSE CALL 911   pantoprazole (PROTONIX) 20 MG EC tablet 7/10/2024 Self No Yes   Sig: Take 1 tablet (20 mg) by mouth daily   torsemide (DEMADEX) 20 MG tablet 7/10/2024 Self No Yes   Sig: Take 2 tablets (40 mg) by mouth daily   Patient taking differently: Take 20 mg by mouth 2 times daily   triamcinolone (KENALOG) 0.1 % external  cream  at prn  Yes Yes   Sig: Apply topically 2 times daily as needed for irritation      Facility-Administered Medications: None     Allergies   Allergies   Allergen Reactions    Aspirin Hives     Reaction occurred during childhood.     Lidocaine Itching    Lisinopril Cough    Losartan      Hyperkalemia      Metformin      Elevated lactic acid    Minocycline      Yellow Dye Allergy. Minocycline has Yellow Dye #10.    Mounjaro [Tirzepatide] Diarrhea and GI Disturbance    Salicylates Hives    Yellow Dye Hives     Rxn to yellow tablet. Eyes swelled shut.     Yellow Dyes (Non-Tartrazine) Hives       Social History   I have reviewed this patient's social history and updated it with pertinent information if needed. Moira Andre  reports that she quit smoking about 51 years ago. Her smoking use included cigarettes. She started smoking about 52 years ago. She has a 0.3 pack-year smoking history. She has never used smokeless tobacco. She reports current alcohol use. She reports that she does not use drugs.    Family History   I have reviewed this patient's family history and updated it with pertinent information if needed.   Family History   Problem Relation Age of Onset    Neurologic Disorder Mother         MS - at 60's    C.A.D. Father          at 8o's, ? prostate ca    Breast Cancer No family hx of     Cancer - colorectal No family hx of        Review of Systems   Comprehensive review of systems was performed with pertinent positives and negatives listed in assessment and plan section.    Physical Exam   Temp: 97.8  F (36.6  C) Temp src: Oral BP: 120/87 Pulse: 105   Resp: 18 SpO2: 93 % O2 Device: None (Room air)    Vital Signs with Ranges  Temp:  [96  F (35.6  C)-97.8  F (36.6  C)] 97.8  F (36.6  C)  Pulse:  [] 105  Resp:  [14-20] 18  BP: (110-135)/() 120/87  SpO2:  [91 %-98 %] 93 %  249 lbs 14.4 oz    Constitutional: awake, alert, cooperative, no apparent distress, and appears stated age  Eyes:  Lids and lashes normal, pupils equal, round and reactive to light, extra ocular muscles intact, sclera clear, conjunctiva normal  ENT: Normocephalic, without obvious abnormality, atraumatic, sinuses nontender on palpation, external ears without lesions, oral pharynx with moist mucous membranes, tonsils without erythema or exudates, gums normal and good dentition.  Hematologic / Lymphatic: no cervical lymphadenopathy  Respiratory: No increased work of breathing, good air exchange, clear to auscultation bilaterally, no crackles or wheezing  Cardiovascular: irregularly irregular rhythm  GI: No scars, normal bowel sounds, soft, non-distended, non-tender, no masses palpated, no hepatosplenomegally  Skin: no bruising or bleeding  Musculoskeletal: There is no redness, warmth, or swelling of the joints.  Full range of motion noted.   Neuropsychiatric: General: normal, calm, and normal eye contact    Data   I personally reviewed all recent ECGs and images.  Results for orders placed or performed during the hospital encounter of 07/11/24 (from the past 24 hour(s))   EKG 12-lead, tracing only   Result Value Ref Range    Systolic Blood Pressure  mmHg    Diastolic Blood Pressure  mmHg    Ventricular Rate 114 BPM    Atrial Rate  BPM    NV Interval  ms    QRS Duration 104 ms     ms    QTc 482 ms    P Axis  degrees    R AXIS -22 degrees    T Axis 124 degrees    Interpretation ECG       Atrial fibrillation with rapid ventricular response  Minimal voltage criteria for LVH, may be normal variant ( [place] product )  Anterior infarct (cited on or before 21-JUL-2007)  Abnormal ECG  When compared with ECG of 19-APR-2024 17:50,  T wave amplitude has decreased in Inferior leads  Confirmed by GENERATED REPORT, COMPUTER ([id number]),  Aasen, Bradley ([id number]) on 7/11/2024 5:56:28 PM     CBC with platelets + differential    Narrative    The following orders were created for panel order CBC with platelets + differential.  Procedure                                Abnormality         Status                     ---------                               -----------         ------                     CBC with platelets and d...[[id number]]  Abnormal            Final result               RBC and Platelet Morphology[[id number]]  Abnormal            Final result                 Please view results for these tests on the individual orders.   Basic metabolic panel   Result Value Ref Range    Sodium 138 135 - 145 mmol/L    Potassium 4.2 3.4 - 5.3 mmol/L    Chloride 100 98 - 107 mmol/L    Carbon Dioxide (CO2) 24 22 - 29 mmol/L    Anion Gap 14 7 - 15 mmol/L    Urea Nitrogen 58.0 (H) 8.0 - 23.0 mg/dL    Creatinine 2.50 (H) 0.51 - 0.95 mg/dL    GFR Estimate 18 (L) >60 mL/min/1.73m2    Calcium 9.0 8.2 - 9.6 mg/dL    Glucose 169 (H) 70 - 99 mg/dL   Troponin T, High Sensitivity   Result Value Ref Range    Troponin T, High Sensitivity 39 (H) <=14 ng/L   CBC with platelets and differential   Result Value Ref Range    WBC Count 10.2 4.0 - 11.0 10e3/uL    RBC Count 4.85 3.80 - 5.20 10e6/uL    Hemoglobin 12.7 11.7 - 15.7 g/dL    Hematocrit 41.2 35.0 - 47.0 %    MCV 85 78 - 100 fL    MCH 26.2 (L) 26.5 - 33.0 pg    MCHC 30.8 (L) 31.5 - 36.5 g/dL    RDW 17.5 (H) 10.0 - 15.0 %    Platelet Count 331 150 - 450 10e3/uL    % Neutrophils 73 %    % Lymphocytes 15 %    % Monocytes 9 %    % Eosinophils 2 %    % Basophils 0 %    % Immature Granulocytes 1 %    NRBCs per 100 WBC 0 <1 /100    Absolute Neutrophils 7.5 1.6 - 8.3 10e3/uL    Absolute Lymphocytes 1.6 0.8 - 5.3 10e3/uL    Absolute Monocytes 0.9 0.0 - 1.3 10e3/uL    Absolute Eosinophils 0.2 0.0 - 0.7 10e3/uL    Absolute Basophils 0.0 0.0 - 0.2 10e3/uL    Absolute Immature Granulocytes 0.1 <=0.4 10e3/uL    Absolute NRBCs 0.0 10e3/uL   BNP   Result Value Ref Range    N terminal Pro BNP Inpatient 14,392 (H) 0 - 1,800 pg/mL   RBC and Platelet Morphology   Result Value Ref Range    RBC Morphology Confirmed RBC Indices     Platelet  Assessment  Automated Count Confirmed. Platelet morphology is normal.     Automated Count Confirmed. Platelet morphology is normal.    Elliptocytes Slight (A) None Seen   XR Chest 2 Views    Narrative    EXAM: XR CHEST 2 VIEWS  LOCATION: [facility]  DATE: 7/11/2024    INDICATION: Shortness of breath  COMPARISON: 4/16/2024      Impression    IMPRESSION: No pleural fluid or pneumothorax. Reticular interstitial opacities throughout the lungs could be seen with edema. The cardiomediastinal silhouette is enlarged.   Lactic Acid Whole Blood w/ 1x repeat in 2 hrs when >2   Result Value Ref Range    Lactic Acid, Initial 1.8 0.7 - 2.0 mmol/L   Fractional excretion of sodium    Narrative    The following orders were created for panel order Fractional excretion of sodium.  Procedure                               Abnormality         Status                     ---------                               -----------         ------                     Fractional Excretion of ...[[id number]]                      Final result               Sodium[[id number]]                       Normal              Final result               Creatinine, serum[[id number]]            Abnormal            Final result                 Please view results for these tests on the individual orders.   Fractional Excretion of Sodium   Result Value Ref Range    Creatinine Urine mg/dL 56.8 mg/dL    Sodium Urine mmol/L 66 mmol/L    %FENA 2.1 %   Troponin T, High Sensitivity   Result Value Ref Range    Troponin T, High Sensitivity 36 (H) <=14 ng/L   Sodium   Result Value Ref Range    Sodium 140 135 - 145 mmol/L   Creatinine, serum   Result Value Ref Range    Creatinine 2.50 (H) 0.51 - 0.95 mg/dL   Glucose by meter   Result Value Ref Range    GLUCOSE BY METER POCT 113 (H) 70 - 99 mg/dL   Basic metabolic panel   Result Value Ref Range    Sodium 140 135 - 145 mmol/L    Potassium 4.3 3.4 - 5.3 mmol/L    Chloride 102 98 - 107 mmol/L    Carbon  Dioxide (CO2) 24 22 - 29 mmol/L    Anion Gap 14 7 - 15 mmol/L    Urea Nitrogen 54.6 (H) 8.0 - 23.0 mg/dL    Creatinine 2.35 (H) 0.51 - 0.95 mg/dL    GFR Estimate 19 (L) >60 mL/min/1.73m2    Calcium 9.0 8.2 - 9.6 mg/dL    Glucose 124 (H) 70 - 99 mg/dL   CBC with platelets   Result Value Ref Range    WBC Count 9.8 4.0 - 11.0 10e3/uL    RBC Count 4.47 3.80 - 5.20 10e6/uL    Hemoglobin 11.8 11.7 - 15.7 g/dL    Hematocrit 37.7 35.0 - 47.0 %    MCV 84 78 - 100 fL    MCH 26.4 (L) 26.5 - 33.0 pg    MCHC 31.3 (L) 31.5 - 36.5 g/dL    RDW 17.3 (H) 10.0 - 15.0 %    Platelet Count 328 150 - 450 10e3/uL   Troponin T, High Sensitivity   Result Value Ref Range    Troponin T, High Sensitivity 41 (H) <=14 ng/L   Glucose by meter   Result Value Ref Range    GLUCOSE BY METER POCT 139 (H) 70 - 99 mg/dL

## 2024-07-12 NOTE — PROGRESS NOTES
07/12/24 0800   Appointment Info   Signing Clinician's Name / Credentials (PT) Tamra Rushing, PT   Living Environment   People in Home alone   Current Living Arrangements apartment   Home Accessibility stairs to enter home   Number of Stairs, Main Entrance 1   Stair Railings, Main Entrance none   Transportation Anticipated car, drives self;family or friend will provide   Living Environment Comments Has home therapies and RN, very supportive family   Self-Care   Usual Activity Tolerance moderate   Current Activity Tolerance fair   Regular Exercise No   Equipment Currently Used at Home walker, rolling   Fall history within last six months yes   Number of times patient has fallen within last six months 4   Activity/Exercise/Self-Care Comment Reports independence with mobility, uses 4ww for mobility.   General Information   Onset of Illness/Injury or Date of Surgery 07/11/24   Referring Physician Dr. Grimaldo   Patient/Family Therapy Goals Statement (PT) To go home   Pertinent History of Current Problem (include personal factors and/or comorbidities that impact the POC) Pt is a 89 year old female admitted with CHF exacerbation   Existing Precautions/Restrictions fall   Cognition   Affect/Mental Status (Cognition) WFL   Orientation Status (Cognition) oriented x 4   Follows Commands (Cognition) WFL   Pain Assessment   Patient Currently in Pain No   Range of Motion (ROM)   Range of Motion ROM is WFL   Strength (Manual Muscle Testing)   Strength (Manual Muscle Testing) Deficits observed during functional mobility   Bed Mobility   Comment, (Bed Mobility) Min A   Transfers   Comment, (Transfers) CGA   Gait/Stairs (Locomotion)   Comment, (Gait/Stairs) 20' 4ww, CGA, decreased janak and forward bent posture   Balance   Balance Comments Good in sitting, fair in standing   Clinical Impression   Criteria for Skilled Therapeutic Intervention Yes, treatment indicated   PT Diagnosis (PT) Impaired ambulation   Influenced by the  following impairments Decreased endurance, decreased strength, decreased balance   Functional limitations due to impairments Difficulty with bed mobility, transfers, ambulation, stair management   Clinical Presentation (PT Evaluation Complexity) stable   Clinical Presentation Rationale VSS, pain controlled   Clinical Decision Making (Complexity) low complexity   Planned Therapy Interventions (PT) balance training;bed mobility training;gait training;patient/family education;stair training;transfer training   Risk & Benefits of therapy have been explained evaluation/treatment results reviewed;care plan/treatment goals reviewed;risks/benefits reviewed;current/potential barriers reviewed;participants voiced agreement with care plan;participants included;patient   PT Total Evaluation Time   PT Eval, Low Complexity Minutes (56456) 10   PT Discharge Planning   PT Plan Progress independence, increase ambulation distance, practice one step   PT Discharge Recommendation (DC Rec) Transitional Care Facility;home with assist;home with home care physical therapy   PT Rationale for DC Rec At baseline, pt lives alone in an apartment with one step to enter. Pt uses a 4ww for ambulation, reports several falls over the past six months. This date, pt is well below baseline and would be a considerable fall risk if she returned to home environment. Pt currently requires assist with all mobility and has decreased activity tolerance. Pt will benefit from continued therapy at [facility] to address impairments and increase mobility and functional independence prior to returning home.  Pt is fairly adamant that she will return to home environment. If patient chooses to return home, would recommend HHPT to improve mobility and independence.   PT Brief overview of current status Assist of 1, decreased activity tolerance   Total Session Time   Total Session Time (sum of timed and untimed services) 10

## 2024-07-12 NOTE — ED NOTES
[facility]  ED Nurse Handoff Report    ED Chief complaint: Abnormal Labs      ED Diagnosis:   Final diagnoses:   Acute on chronic congestive heart failure, unspecified heart failure type (H)   Shortness of breath   Weakness       Code Status: Full Code    Allergies:   Allergies   Allergen Reactions    Aspirin Hives     Reaction occurred during childhood.     Lidocaine Itching    Lisinopril Cough    Losartan      Hyperkalemia      Metformin      Elevated lactic acid    Minocycline      Yellow Dye Allergy. Minocycline has Yellow Dye #10.    Mounjaro [Tirzepatide] Diarrhea and GI Disturbance    Salicylates Hives    Yellow Dye Hives     Rxn to yellow tablet. Eyes swelled shut.     Yellow Dyes (Non-Tartrazine) Hives       Patient Story:  89 year old female who presents with abnormal labs.  Patient was referred in from the clinic for abnormal labs.  Patient has had ongoing shortness of breath for a while.  She reports the whole summer.  She states that she has not been feeling well the last couple days and therefore has not been taking her medications as prescribed.  Patient is quite upset that she was feeling well until she went to the clinic and they called her back to the ER with abnormal labs. Has trops chronically in the 30's but was elevated so were BNP and Creat.  Has a cat who sister is going to take care of.  Focused Assessment:  In A-Fib.  Pt walks with walker.  Needs assist of 2 to get back into bed.        Treatments and/or interventions provided: Labs and imaging    Patient's response to treatments and/or interventions: pt anxious to get to room  Results for orders placed or performed during the hospital encounter of 07/11/24   XR Chest 2 Views     Status: None    Narrative    EXAM: XR CHEST 2 VIEWS  LOCATION: [facility]  DATE: 7/11/2024    INDICATION: Shortness of breath  COMPARISON: 4/16/2024      Impression    IMPRESSION: No pleural fluid or pneumothorax.  Reticular interstitial opacities throughout the lungs could be seen with edema. The cardiomediastinal silhouette is enlarged.   Basic metabolic panel     Status: Abnormal   Result Value Ref Range    Sodium 138 135 - 145 mmol/L    Potassium 4.2 3.4 - 5.3 mmol/L    Chloride 100 98 - 107 mmol/L    Carbon Dioxide (CO2) 24 22 - 29 mmol/L    Anion Gap 14 7 - 15 mmol/L    Urea Nitrogen 58.0 (H) 8.0 - 23.0 mg/dL    Creatinine 2.50 (H) 0.51 - 0.95 mg/dL    GFR Estimate 18 (L) >60 mL/min/1.73m2    Calcium 9.0 8.2 - 9.6 mg/dL    Glucose 169 (H) 70 - 99 mg/dL   Troponin T, High Sensitivity     Status: Abnormal   Result Value Ref Range    Troponin T, High Sensitivity 39 (H) <=14 ng/L   CBC with platelets and differential     Status: Abnormal   Result Value Ref Range    WBC Count 10.2 4.0 - 11.0 10e3/uL    RBC Count 4.85 3.80 - 5.20 10e6/uL    Hemoglobin 12.7 11.7 - 15.7 g/dL    Hematocrit 41.2 35.0 - 47.0 %    MCV 85 78 - 100 fL    MCH 26.2 (L) 26.5 - 33.0 pg    MCHC 30.8 (L) 31.5 - 36.5 g/dL    RDW 17.5 (H) 10.0 - 15.0 %    Platelet Count 331 150 - 450 10e3/uL    % Neutrophils 73 %    % Lymphocytes 15 %    % Monocytes 9 %    % Eosinophils 2 %    % Basophils 0 %    % Immature Granulocytes 1 %    NRBCs per 100 WBC 0 <1 /100    Absolute Neutrophils 7.5 1.6 - 8.3 10e3/uL    Absolute Lymphocytes 1.6 0.8 - 5.3 10e3/uL    Absolute Monocytes 0.9 0.0 - 1.3 10e3/uL    Absolute Eosinophils 0.2 0.0 - 0.7 10e3/uL    Absolute Basophils 0.0 0.0 - 0.2 10e3/uL    Absolute Immature Granulocytes 0.1 <=0.4 10e3/uL    Absolute NRBCs 0.0 10e3/uL   BNP     Status: Abnormal   Result Value Ref Range    N terminal Pro BNP Inpatient 14,392 (H) 0 - 1,800 pg/mL   RBC and Platelet Morphology     Status: Abnormal   Result Value Ref Range    RBC Morphology Confirmed RBC Indices     Platelet Assessment  Automated Count Confirmed. Platelet morphology is normal.     Automated Count Confirmed. Platelet morphology is normal.    Elliptocytes Slight (A) None Seen    EKG 12-lead, tracing only     Status: None   Result Value Ref Range    Systolic Blood Pressure  mmHg    Diastolic Blood Pressure  mmHg    Ventricular Rate 114 BPM    Atrial Rate  BPM    CA Interval  ms    QRS Duration 104 ms     ms    QTc 482 ms    P Axis  degrees    R AXIS -22 degrees    T Axis 124 degrees    Interpretation ECG       Atrial fibrillation with rapid ventricular response  Minimal voltage criteria for LVH, may be normal variant ( Kris product )  Anterior infarct (cited on or before 21-JUL-2007)  Abnormal ECG  When compared with ECG of 19-APR-2024 17:50,  T wave amplitude has decreased in Inferior leads  Confirmed by GENERATED REPORT, COMPUTER ([id number]),  Aasen, Bradley ([id number]) on 7/11/2024 5:56:28 PM     CBC with platelets + differential     Status: Abnormal    Narrative    The following orders were created for panel order CBC with platelets + differential.  Procedure                               Abnormality         Status                     ---------                               -----------         ------                     CBC with platelets and d...[[id number]]  Abnormal            Final result               RBC and Platelet Morphology[[id number]]  Abnormal            Final result                 Please view results for these tests on the individual orders.   Results for orders placed or performed in visit on 07/11/24   CBC with platelets     Status: Abnormal   Result Value Ref Range    WBC Count 10.1 4.0 - 11.0 10e3/uL    RBC Count 5.13 3.80 - 5.20 10e6/uL    Hemoglobin 13.0 11.7 - 15.7 g/dL    Hematocrit 43.4 35.0 - 47.0 %    MCV 85 78 - 100 fL    MCH 25.3 (L) 26.5 - 33.0 pg    MCHC 30.0 (L) 31.5 - 36.5 g/dL    RDW 17.1 (H) 10.0 - 15.0 %    Platelet Count 336 150 - 450 10e3/uL   UA Macroscopic with reflex to Microscopic and Culture - Lab Collect     Status: Abnormal    Specimen: Urine, Midstream   Result Value Ref Range    Color Urine Yellow Colorless, Straw, Light Yellow, Yellow     "Appearance Urine Cloudy (A) Clear    Glucose Urine Negative Negative mg/dL    Bilirubin Urine Negative Negative    Ketones Urine Negative Negative mg/dL    Specific Gravity Urine 1.015 1.003 - 1.035    Blood Urine Moderate (A) Negative    pH Urine 5.5 5.0 - 7.0    Protein Albumin Urine 100 (A) Negative mg/dL    Urobilinogen Urine 0.2 0.2, 1.0 E.U./dL    Nitrite Urine Negative Negative    Leukocyte Esterase Urine Large (A) Negative   Troponin T, High Sensitivity     Status: Abnormal   Result Value Ref Range    Troponin T, High Sensitivity 45 (H) <=14 ng/L   Urine Microscopic Exam     Status: Abnormal   Result Value Ref Range    Bacteria Urine Many (A) None Seen /HPF    RBC Urine 0-2 0-2 /HPF /HPF    WBC Urine >100 (A) 0-5 /HPF /HPF    Squamous Epithelials Urine Few (A) None Seen /LPF       To be done/followed up on inpatient unit:      Does this patient have any cognitive concerns?:  none    Activity level - Baseline/Home:  Walker  Activity Level - Current:   Stand with Assist and Walker    Patient's Preferred language: English   Needed?: No    Isolation: None  Infection: Not Applicable  Patient tested for COVID 19 prior to admission: NO  Bariatric?: No    Vital Signs:   Vitals:    07/11/24 1611 07/11/24 1700 07/11/24 1707 07/11/24 1804   BP:  119/85  126/81   Pulse: 119 110 114 86   Resp:  20 14 19   Temp:       SpO2:  95% 96% 95%   Weight:   114.3 kg (252 lb)    Height:   1.702 m (5' 7\")        Cardiac Rhythm:Cardiac Rhythm: Atrial fibrillation    Was the PSS-3 completed:   Yes  What interventions are required if any?               Family Comments:   OBS brochure/video discussed/provided to patient/family: N/A              Name of person given brochure if not patient:               Relationship to patient:     For the majority of the shift this patient's behavior was Green.   Behavioral interventions performed were    ED NURSE PHONE NUMBER: *[phone]         "

## 2024-07-12 NOTE — CONSULTS
Care Management Initial Consult    General Information  Assessment completed with: PatientKristyn  Type of CM/SW Visit: Initial Assessment    Primary Care Provider verified and updated as needed: Yes   Readmission within the last 30 days: no previous admission in last 30 days      Reason for Consult: discharge planning  Advance Care Planning: Advance Care Planning Reviewed: other (see comments) (NO document)          Communication Assessment  Patient's communication style: spoken language (English or Bilingual)    Hearing Difficulty or Deaf: yes   Wear Glasses or Blind: yes    Cognitive  Cognitive/Neuro/Behavioral: WDL                      Living Environment:   People in home: alone     Current living Arrangements: apartment      Able to return to prior arrangements: yes  Living Arrangement Comments: TCU recommending, patient declining    Family/Social Support:  Care provided by: self, homecare agency  Provides care for: no one  Marital Status:   Children, Friend          Description of Support System: Supportive, Involved    Support Assessment: Adequate family and caregiver support, Adequate social supports    Current Resources:   Patient receiving home care services: Yes  Skilled Home Care Services: Skilled Nursing, Physical Therapy, Occupational Therapy  Community Resources: [facility] Programs, Home Care  Equipment currently used at home: walker, rolling  Supplies currently used at home: Hearing Aid Batteries, Oxygen Tubing/Supplies    Employment/Financial:  Employment Status: retired        Financial Concerns: none   Referral to Financial Worker: No  Finance Comments: [employer] Medicare Advantage    Does the patient's insurance plan have a 3 day qualifying hospital stay waiver?  Yes     Which insurance plan 3 day waiver is available? Alternative insurance waiver    Will the waiver be used for post-acute placement? No    Lifestyle & Psychosocial Needs:  Social Determinants of Health     Food  Insecurity: Low Risk  (10/26/2023)    Food Insecurity     Within the past 12 months, did you worry that your food would run out before you got money to buy more?: No     Within the past 12 months, did the food you bought just not last and you didn t have money to get more?: No   Depression: Not at risk (7/11/2024)    PHQ-2     PHQ-2 Score: 1   Recent Concern: Depression - At risk (5/30/2024)    PHQ-2     PHQ-2 Score: 3   Housing Stability: Low Risk  (10/26/2023)    Housing Stability     Do you have housing? : Yes     Are you worried about losing your housing?: No   Tobacco Use: Medium Risk (7/11/2024)    Patient History     Smoking Tobacco Use: Former     Smokeless Tobacco Use: Never     Passive Exposure: Not on file   Financial Resource Strain: Low Risk  (10/26/2023)    Financial Resource Strain     Within the past 12 months, have you or your family members you live with been unable to get utilities (heat, electricity) when it was really needed?: No   Alcohol Use: Not on file   Transportation Needs: Low Risk  (10/26/2023)    Transportation Needs     Within the past 12 months, has lack of transportation kept you from medical appointments, getting your medicines, non-medical meetings or appointments, work, or from getting things that you need?: No   Physical Activity: Not on file   Interpersonal Safety: Low Risk  (1/11/2024)    Interpersonal Safety     Do you feel physically and emotionally safe where you currently live?: Yes     Within the past 12 months, have you been hit, slapped, kicked or otherwise physically hurt by someone?: No     Within the past 12 months, have you been humiliated or emotionally abused in other ways by your partner or ex-partner?: No   Stress: No Stress Concern Present (6/10/2021)    [employer] [place] [employer] - Occupational Stress Questionnaire     Feeling of Stress : Not at all   Social Connections: Unknown (6/10/2021)    Social Connection and Isolation Panel [NHANES]      "Frequency of Communication with Friends and Family: Not on file     Frequency of Social Gatherings with Friends and Family: Not on file     Attends Sabianism Services: Not on file     Active Member of Clubs or Organizations: Not on file     Attends Club or Organization Meetings: Not on file     Marital Status:    Health Literacy: Not on file       Functional Status:  Prior to admission patient needed assistance:   Dependent ADLs:: Ambulation-walker  Dependent IADLs:: Cleaning, Shopping       Mental Health Status:          Chemical Dependency Status:                Values/Beliefs:  Spiritual, Cultural Beliefs, Sabianism Practices, Values that affect care: no               Additional Information:  Per care management consult for discharge planning, chart was reviewed. Patient is a pleasant 89 year old female that presents to [facility] via family. According to the H & P \"history of PAF on anticoagulation, Chronic diastolic heart failure, NYHA class II, ELIGIO wears CPAP, CAD with PCI to OM/Dx in 2007, mild-mod Aortic stenosis, moderate mitral stenosis, Hyperlipidemia, Stage III CKD, Obesity, DM, RA, OA, Depression who presents with weakness\".    Met with patient at bedside to introduce self and role. Patient shares she lives alone in her [place] apartment. She has one stair to enter her apartment and then all needs are on main floor. Patient shares her laundry mat is across the street from her apartment. But it is not a long walk. Patient reports ambulating with a walker at baseline. She has grab bars in her bathroom by the toilet and shower. She reports being independent with ADL's and IADL's. She still drives. Patient shares support from her son, kartik and daughter in law, Sophia. She also has a supportive community through people in her building. Patient reports that she has a cleaning service that comes out every 2 weeks to assist. She reports getting on food stamps, got a " bedside commode and is working on potentially getting bed railings. Patient reports using 2.L liters of Home O2 through [facility] at night. She will also utilize it during the day if needed. She reports she has been using the O2 more at night due to her CPAP getting repairs since there was a recall. Patient confirmed PCP. Writer discussed recommendations for TCU. Patient reports that her last stay at TCU she had excellent nursing care, but did not believe that the therapy was beneficial. Patient would prefer to return back home with resumption of her homecare services through [facility]. Writer acknowledged patient's wish to return home with resumption.     Confirmed with [facility] that patient is a current patient for homecare services. At discharge will need resumption of homecare services.     FABRICIO Galvan, [facility]  Social Work- Inpatient Care Coordination  [facility]

## 2024-07-12 NOTE — ED PROVIDER NOTES
Emergency Department Note      History of Present Illness     Chief Complaint   Abnormal Labs      HPI   Moira Andre is a 89 year old female who presents with abnormal labs.  Patient was referred in from the clinic for abnormal labs.  Patient has had ongoing shortness of breath for a while.  She reports the whole summer.  She states that she has not been feeling well the last couple days and therefore has not been taking her medications as prescribed.  Patient is quite upset that she was feeling well until she went to the clinic and they called her back to the ER with abnormal labs.    Independent Historian   None    Review of External Notes   Reviewed primary care note from today 7/11/2024 as well as discharge summary from 4/14/2024.    Past Medical History     Medical History and Problem List   Past Medical History:   Diagnosis Date    Aortic valve sclerosis     Arrhythmia     Aspirin allergy     Asthma     CKD (chronic kidney disease) stage 3, GFR 30-59 ml/min (H)     Congestive heart failure, unspecified     Depression     Diabetes mellitus (H) 2010    Diastolic dysfunction, left ventricle 2013    HTN (hypertension)     Lactic acidosis 08/2018    Migraine headache     Mitral stenosis     Myocardial infarction (H) 9/2007, cath 2013 ml    Nephrolithiasis     OA (osteoarthritis) of knee     Obesity     Rheumatoid arthritis flare (H)     Sleep apnea     TIA (transient ischaemic attack)     Ventral hernia, unspecified, without mention of obstruction or gangrene        Medications   acetaminophen (TYLENOL) 500 MG tablet  albuterol (PROAIR HFA/PROVENTIL HFA/VENTOLIN HFA) 108 (90 Base) MCG/ACT inhaler  apixaban ANTICOAGULANT (ELIQUIS) 2.5 MG tablet  atorvastatin (LIPITOR) 20 MG tablet  blood glucose (ACCU-CHEK GUIDE) test strip  blood glucose (ACCU-CHEK SOFTCLIX) lancing device  blood glucose monitoring (SOFTCLIX) lancets  buPROPion (WELLBUTRIN SR) 100 MG 12 hr tablet  calcium carbonate (TUMS) 500 MG chewable  "tablet  cephALEXin (KEFLEX) 500 MG capsule  Cholecalciferol (VITAMIN D) 125 MCG (5000 UT) capsule  DULoxetine (CYMBALTA) 60 MG capsule  fexofenadine (ALLEGRA) 60 MG tablet  fluticasone-salmeterol (WIXELA INHUB) 100-50 MCG/ACT inhaler  glimepiride (AMARYL) 2 MG tablet  Menthol, Topical Analgesic, (ICY HOT MEDICATED SPRAY EX)  metoprolol tartrate (LOPRESSOR) 25 MG tablet  miconazole (MICATIN) 2 % external cream  Multiple Vitamins-Minerals (HAIR SKIN AND NAILS FORMULA PO)  nitroGLYcerin (NITROSTAT) 0.4 MG sublingual tablet  pantoprazole (PROTONIX) 20 MG EC tablet  torsemide (DEMADEX) 20 MG tablet  triamcinolone (KENALOG) 0.1 % external cream        Surgical History   Past Surgical History:   Procedure Laterality Date    APPENDECTOMY      BIOPSY BREAST      x2 -needle & lumpectomy-benign    CHOLECYSTECTOMY      CORONARY ANGIOGRAPHY ADULT ORDER  9/28/2007    Bare metal stent to OM1, Diagonal patent     CORONARY ANGIOGRAPHY ADULT ORDER  9/25/2007    [place] stent to Diagonal    HC LEFT HEART CATHETERIZATION  8/2013    Moderate CAD    HYSTERECTOMY TOTAL ABDOMINAL      ORTHOPEDIC SURGERY      knee replacement on right side (2006), Left side (2016)    RELEASE CARPAL TUNNEL      right and left    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  Head: No obvious trauma to head.  Ears, Nose, Throat:  External ears normal.  Nose normal.   Eyes:  Conjunctivae clear.  Pupils are equal, round, and reactive.   Neck: Normal range of motion.  Neck supple.   CV: Irregularly irregular rate and rhythm.    Respiratory: Effort normal " and breath sounds normal except for very subtle crackles in the base.  No retractions or increased work of breathing.  Gastrointestinal: Soft.  No distension. There is no tenderness.  There is no rigidity, no rebound and no guarding.   Musculoskeletal: Bilateral pitting edema to the lower extremities  Neuro: Alert. Moving all extremities appropriately.  Normal speech.    Skin: Skin is warm and dry.  No rash noted.     Diagnostics     Lab Results   Labs Ordered and Resulted from Time of ED Arrival to Time of ED Departure   BASIC METABOLIC PANEL - Abnormal       Result Value    Sodium 138      Potassium 4.2      Chloride 100      Carbon Dioxide (CO2) 24      Anion Gap 14      Urea Nitrogen 58.0 (*)     Creatinine 2.50 (*)     GFR Estimate 18 (*)     Calcium 9.0      Glucose 169 (*)    TROPONIN T, HIGH SENSITIVITY - Abnormal    Troponin T, High Sensitivity 39 (*)    CBC WITH PLATELETS AND DIFFERENTIAL - Abnormal    WBC Count 10.2      RBC Count 4.85      Hemoglobin 12.7      Hematocrit 41.2      MCV 85      MCH 26.2 (*)     MCHC 30.8 (*)     RDW 17.5 (*)     Platelet Count 331      % Neutrophils 73      % Lymphocytes 15      % Monocytes 9      % Eosinophils 2      % Basophils 0      % Immature Granulocytes 1      NRBCs per 100 WBC 0      Absolute Neutrophils 7.5      Absolute Lymphocytes 1.6      Absolute Monocytes 0.9      Absolute Eosinophils 0.2      Absolute Basophils 0.0      Absolute Immature Granulocytes 0.1      Absolute NRBCs 0.0     NT PROBNP INPATIENT - Abnormal    N terminal Pro BNP Inpatient 14,392 (*)    RBC AND PLATELET MORPHOLOGY - Abnormal    RBC Morphology Confirmed RBC Indices      Platelet Assessment        Value: Automated Count Confirmed. Platelet morphology is normal.    Elliptocytes Slight (*)    FRACTIONAL EXCRETION OF SODIUM     Imaging   XR Chest 2 Views   Final Result   IMPRESSION: No pleural fluid or pneumothorax. Reticular interstitial opacities throughout the lungs could be seen with  edema. The cardiomediastinal silhouette is enlarged.      Echocardiogram Complete    (Results Pending)   Report per radiology.    EKG   ECG taken at 1618, ECG read at 1630  Atrial fibrillation with rapid ventricular response  Minimal voltage criteria for LVH, may be normal variant (Valentines product)  Anterior infarct, age undetermined   No significant change as compared to prior, dated 4/17/24.  Rate 114 bpm. OH interval * ms. QRS duration 104 ms. QT/QTc 350/482 ms. P-R-T axes * -22 124.    Independent Interpretation   CXR shows pulm edema     ED Course      Medications Administered   Medications   furosemide (LASIX) injection 40 mg (has no administration in time range)     Procedures   Procedures     Discussion of Management   Admitting Hospitalist, see below  Clinical Pharmacist, see below    ED Course   ED Course as of 07/11/24 2121   Thu Jul 11, 2024 1954 Reassessed the patient.  She is agreeable.   2009 Consulted Pharm.D. regarding Lasix dosing given acute kidney injury.  Opted to try a dose of Lasix to see if creatinine improves as we suspect this is cardiorenal.   2018 I spoke with MARISELA about findings and recommendations.     2042 I spoke with Dr Grimaldo for admission       Optional/Additional Documentation  None    Medical Decision Making / Diagnosis     CMS Diagnoses: None    MIPS       None    MDM   Moira Andre is a 89 year old female who presents emergency department for abnormal labs.  Vitals are stable.  Patient intermittently has elevated rates but when she is calm her rates are controlled.  Broad differentials considered include not limited to CHF, dehydration, noncompliance, pneumonia, with her confusion, UTI, ACS, arrhythmia, etc.  CBC without leukocytosis or anemia.  BMP shows no acute on chronic kidney disease.  Creatinine 2.5.  No electrolyte or metabolic abnormalities.  Troponin mildly elevated 39 but EKG showing atrial fibs, no acute ischemic change.  Rates are controlled.  Do not think that  rate controlling medications indicated at this time.  Will continue to trend troponin.  Down slightly from in the clinic today.  Suspect elevated troponin in the setting of demand related to CHF exacerbation.  BNP is markedly elevated at 14,000.  Chest x-ray shows some pulmonary edema.  Patient's breathing well.  No indication for aggressive intervention at this time but do feel that an attempt at diuresis with Lasix is appropriate.  I discussed with patient that we will watch kidney function closely.  I hope that with diuresis kidney function improves if this is related to patient's CHF.  Patient overall is noncompliant.  Also has UTI antibiotics were initiated by the hospitalist.  Patient is quite medically complicated but has been not on her medications for the last 2 days that she has not been feeling well.  I do not suspect PE but will continue to monitor and get patient back on her blood thinners.  Her rates are controlled we will continue her oral medications.  Overall patient does require medical admission for optimization and to ensure that she is improving.  Discussed with hospitalist who agrees with admission.    Disposition   The patient was admitted    Diagnosis     ICD-10-CM    1. Acute on chronic congestive heart failure, unspecified heart failure type (H)  I50.9       2. Shortness of breath  R06.02       3. Weakness  R53.1            Discharge Medications   New Prescriptions    No medications on file     Scribe Disclosure:  I, Karlie Jen, am serving as a scribe at 9:21 PM on 7/11/2024 to document services personally performed by Almaz Miranda MD based on my observations and the provider's statements to me.        Almaz Miranda MD  07/11/24 3189

## 2024-07-12 NOTE — PLAN OF CARE
Goal Outcome Evaluation:     Monitor remains Atrial fib with CVR. Pt. Had AV node ablation and PPM today. Left chest Pacer dressing CDI . Right groin site stable with stopcock in place. Continue plan of care.

## 2024-07-12 NOTE — PROGRESS NOTES
[facility]    Medicine Progress Note - Hospitalist Service    Date of Admission:  7/11/2024    Assessment & Plan     Moira Andre is a 89 year old female who has history of PAF on anticoagulation, Chronic diastolic heart failure, NYHA class II, ELIGIO wears CPAP, CAD with PCI to OM/Dx in 2007, mild-mod Aortic stenosis, moderate mitral stenosis, Hyperlipidemia, Stage III CKD, Obesity, DM, RA, OA, Depression who presents with weakness.  She was diagnosed with a UTI and has been suppose to take keflex.  Her Troponin were elevated to 45, WBC is 10.1, hgb 13, EKG shows atrial fibrillation with resting HR of 111 in clinic. She was diagnosed with UTI at her clinic.  CXR revealed a full reticular interstitial pattern throughout the lungs  concerning for pulmonary edema and CHF. S/p single chamber ppm and AVN ablation 7/12/24.     Acute decompensated diastolic heart failure  Atrial fibrillation with elevated HR  S/p single chamber ppm and AVN ablation 7/12/24  Admit patient to inpatient cardiac bed with telemetry.  She has been admitted 3 times since 10/2023, most recently on 4/14/24 where she was thought to have COPD exacerbation that set of her atrial fibrillation with RVR.  Her dry weight at discharge in 12/2023 was 256 lbs.  Etiology of her CHF exacerbation include inadequate HR control, COPD exacerbation like her last admission but no active wheezing noted, non compliance with her CPAP, medical non compliance (hasn't taken her meds for a few days until she restarted last night/, ischemia,  Urinary tract infection and dietary non complaince,(had chinese buffet this afternoon).  Admit to cardiac telemetry bed  Strict I/O, daily weights, dry weight 12/23 was 256 lbs  Continue metoprolol 75 mg BID  Continue eliquis  Will re-evaluate need for diuresis on 7/13      ASCVD with PCI OM/Dx in 2007  Valvular disease mild-mod Aortic stenosis, moderate mitral stenosis  Hyperlipidemia  She denies any chest  "pain, troponin mildly elevated and  Patient denies any chest pain, EKG shows atrial fibrillation.  Troponin is 45 and 39.  Patient is allergic to asa  Continue Plavix  Continue Lipitor  NTG as needed     Urinary tract infection  She was noted to have foul smelling urine per home care.  She had an abnormal UA and suppose to be started on keflex   Ceftriaxone while admitted  To [facility] at discharge     TIFFANI in CKD stage III  She admits to not taking any of her medications for a few days and not eating much until today when she ate at a chinese buffet. Creatinine trend 2.50, 2.35. Has been NPO most of day 7/12, going to hold diuresis currently.   Monitor I/O and daily BMP     COPD / Asthma  ELIGIO  Obesity  She states she is using oxygen more at night time.  She is not needing supplemental oxygen hear and respiring comfortably.  She does not appear to have COPD exacerbation  Continue albuterol MDI and Advair  She has not been compliant with her CPAP machine and waited for many months and just recently got her cpap.  She wore it one day and it apparently \"fell apart\" and she has been unable to use this.  She does have home care.  CPAP at bedtime     DM Type II  Mod cho diet  Hold amaryl  Accu checks with med intensity sliding scale     Depression  Continue Wellbutrin and Cymbalta  She appears to be compensated          Diet: Combination Diet Moderate Consistent Carb (60 g CHO per Meal) Diet; 2 gm NA Diet; Low Saturated Fat Na <2400mg Diet, No Caffeine Diet    DVT Prophylaxis: Pneumatic Compression Devices  Charles Catheter: Not present  Lines: PRESENT           Cardiac Monitoring: ACTIVE order. Indication: Post- EP procedure (48 hours)  Code Status: Full Code      Clinically Significant Risk Factors Present on Admission               # Drug Induced Coagulation Defect: home medication list includes an anticoagulant medication   # Acute Kidney Injury, unspecified: based on a >150% or 0.3 mg/dL increase in last creatinine " "compared to past 90 day average, will monitor renal function  # Hypertension: Noted on problem list  # Acute heart failure with preserved ejection fraction: heart failure noted on problem list, last echo with EF >50%, and receiving IV diuretics           # DMII: A1C = 7.2 % (Ref range: 0.0 - 5.6 %) within past 6 months    # Obesity: Estimated body mass index is 39.14 kg/m  as calculated from the following:    Height as of this encounter: 1.702 m (5' 7\").    Weight as of this encounter: 113.4 kg (249 lb 14.4 oz).         # Financial/Environmental Concerns: none  # Asthma: noted on problem list  # Pacemaker present       Disposition Plan     Medically Ready for Discharge: Anticipated in 2-4 Days         Jose Whittaker,   Hospitalist Service  [facility]  Securely message with [employer] (more info)  Text page via [employer] Paging/Directory   ______________________________________________________________________    Interval History     - Met with patient after EP procedure  - She reports to be doing fair  - She denies any significant pain or shortness of breath at this time  - She has no other acute concerns    Physical Exam   Vital Signs: Temp: 97.5  F (36.4  C) Temp src: Oral BP: (!) 142/98 Pulse: 82   Resp: 12 SpO2: 98 % O2 Device: None (Room air) Oxygen Delivery: 2 LPM  Weight: 249 lbs 14.4 oz    Constitutional: awake, alert, cooperative, no apparent distress, and appears stated age obese  Eyes: Lids and lashes normal, pupils equal, round and reactive to light   ENT: Normocephalic, without obvious abnormality, atraumatic  Respiratory: No increased work of breathing, good air exchange, clear to auscultation bilaterally, no crackles or wheezing  Cardiovascular: Normal apical impulse, regular rate and rhythm, normal S1 and S2, no S3 or S4, and no murmur noted  GI: No scars, normal bowel sounds, soft, non-distended, non-tender, no masses palpated, no hepatosplenomegally  Skin: Left chest wall " pacemaker site clean, dry, and intact   Musculoskeletal: There is no redness, warmth, or swelling of the joints.  Full range of motion noted.  Motor strength is 5 out of 5 all extremities bilaterally.  Tone is normal.  Neurologic: Awake, alert, oriented to name, place and time.     Neuropsychiatric: General: normal, calm, and normal eye contact    Medical Decision Making       52 MINUTES SPENT BY ME on the date of service doing chart review, history, exam, documentation & further activities per the note.      Data   ------------------------- PAST 24 HR DATA REVIEWED -----------------------------------------------    I have personally reviewed the following data over the past 24 hrs:    9.8  \   11.8   / 328     140 102 54.6 (H) /  139 (H)   4.3 24 2.35 (H) \     Trop: 41 (H) BNP: 14,392 (H)     Procal: N/A CRP: N/A Lactic Acid: 1.8         Imaging results reviewed over the past 24 hrs:   Recent Results (from the past 24 hour(s))   XR Chest 2 Views    Narrative    EXAM: XR CHEST 2 VIEWS  LOCATION: [facility]  DATE: 7/11/2024    INDICATION: Shortness of breath  COMPARISON: 4/16/2024      Impression    IMPRESSION: No pleural fluid or pneumothorax. Reticular interstitial opacities throughout the lungs could be seen with edema. The cardiomediastinal silhouette is enlarged.   EP Device    Narrative    Table formatting from the original result was not included.  PROCEDURES PERFORMED:   1. Implantation of a single-chamber pacemaker with the ventricular lead  2. Conscious sedation.   3. Cardiac fluoroscopy  4. AV refugio ablation    INDICATION: medical refractory  AF    HISTORY OF PRESENT ILLNESS: This is a 89 year old year-old patient with a   history of persistent AF with RVR refractory to medical therapy. She is   referred for a ppm and AVN ablation.    METHOD: Informed consent was obtained and Intravenous antibiotic was given   prior to the procedure. The patient was prepped and draped in the  usual   manner.  The procedure was performed under conscious sedation. 1%   lidocaine was infiltrated into the left axillary vein area. This vein was   accessed using the modified Seldinger technique and ultrasound guidance   when applicable with a micropuncture needle.  An incision was then made   with a #15 blade. A peelable sheath was then advanced over the guidewire.    A long sheath and the lead were then advanced into the heart.  Mapping of   the left bundle region was performed.  The lead was fixed into mid RV   septum area under fluoroscopic guidance. Appropriate impedance sensing and   threshold were obtained. The long sheath was then cut away and the outer   sheath peeled away. The lead was then secured to the pectoralis muscle   fascia with O-Ethibond sutures.    A pocket was then fashioned and was irrigated with NS solution. The lead   was then attached to the device and placed in the pocket.  The pocket was   then closed with 2-0 and 4-0 Vicryl sutures. Steri-Strips and an OpSite   dressing were then placed over the incision.      The right groin was then prepped and draped the usual fashion. 1%   Lidocaine was infiltrated into the area. An 8-Slovenian sheath was placed in   the right femoral vein via the modified Seldinger's technique. A large   curve 5 mm ablation catheter was then advanced into the heart. Ablation   was then performed using an EPT generator. Both catheter and sheath were   then removed. Hemostasis was achieved by placement of a suture in a figure   of 8 configuration held by a stopcock. The patient was then transferred   back to the room in stable condition.     DEVICE INFORMATION:  Implant Name Type Inv. Item Serial No.  Lot No. LRB No. Used   Action   LEAD PACEMAKER SELECTSECURE 69CM MD  3830-69 - [id number] Leads LEAD   PACEMAKER SELECTSECURE 69CM MD  3830-69 [id number] [employer]   [id number]  1 Implanted   PACEMAKER HANNAH XT SR MRI S - [id number] Pacemaker  PACEMAKER HANNAH XT SR   MRI SYS [id number] [employer] [id number]  1 Implanted       STIMULATION THRESHOLDS:    RV -  Sense:5.25 mV, Threshold: 1.375 V @ 0.4-0.5 ms, Impedance: 779 ohms      ABLATION SUMMARY: Approximately 3 applications of radiofrequency ablation   were targeted at the AV node at 50 Lo and 60 degrees Celsius for 10-60   seconds after mapping of the AVN was done. Complete heart block was   achieved without a junctional escape rate of 40 beats per minute.    COMPLICATIONS: None.    Fluoroscopy (minutes):Dose 25.1mSv Fl Time:0min, DAP:6.3    CONCLUSION:  1. Uneventful ablation of AV node and implantation of a device pacemaker.    Davion Baron MD                EP Ablation    Narrative    Table formatting from the original result was not included.  PROCEDURES PERFORMED:   1. Implantation of a single-chamber pacemaker with the ventricular lead  2. Conscious sedation.   3. Cardiac fluoroscopy  4. AV refugio ablation    INDICATION: medical refractory  AF    HISTORY OF PRESENT ILLNESS: This is a 89 year old year-old patient with a   history of persistent AF with RVR refractory to medical therapy. She is   referred for a ppm and AVN ablation.    METHOD: Informed consent was obtained and Intravenous antibiotic was given   prior to the procedure. The patient was prepped and draped in the usual   manner.  The procedure was performed under conscious sedation. 1%   lidocaine was infiltrated into the left axillary vein area. This vein was   accessed using the modified Seldinger technique and ultrasound guidance   when applicable with a micropuncture needle.  An incision was then made   with a #15 blade. A peelable sheath was then advanced over the guidewire.    A long sheath and the lead were then advanced into the heart.  Mapping of   the left bundle region was performed.  The lead was fixed into mid RV   septum area under fluoroscopic guidance. Appropriate impedance sensing and   threshold were obtained. The  long sheath was then cut away and the outer   sheath peeled away. The lead was then secured to the pectoralis muscle   fascia with O-Ethibond sutures.    A pocket was then fashioned and was irrigated with NS solution. The lead   was then attached to the device and placed in the pocket.  The pocket was   then closed with 2-0 and 4-0 Vicryl sutures. Steri-Strips and an OpSite   dressing were then placed over the incision.      The right groin was then prepped and draped the usual fashion. 1%   Lidocaine was infiltrated into the area. An 8-Togolese sheath was placed in   the right femoral vein via the modified Seldinger's technique. A large   curve 5 mm ablation catheter was then advanced into the heart. Ablation   was then performed using an EPT generator. Both catheter and sheath were   then removed. Hemostasis was achieved by placement of a suture in a figure   of 8 configuration held by a stopcock. The patient was then transferred   back to the room in stable condition.     DEVICE INFORMATION:  Implant Name Type Inv. Item Serial No.  Lot No. LRB No. Used   Action   LEAD PACEMAKER SELECTSECURE 69CM MD  3830-69 - [id number] Leads LEAD   PACEMAKER SELECTSECURE 69CM MD  3830-69 [id number] [employer]   [id number]  1 Implanted   PACEMAKER HANNAH XT SR MRI SYS - [id number] Pacemaker PACEMAKER HANNAH XT SR   MRI SYS [id number] [employer] [id number]  1 Implanted       STIMULATION THRESHOLDS:    RV -  Sense:5.25 mV, Threshold: 1.375 V @ 0.4-0.5 ms, Impedance: 779 ohms      ABLATION SUMMARY: Approximately 3 applications of radiofrequency ablation   were targeted at the AV node at 50 Lo and 60 degrees Celsius for 10-60   seconds after mapping of the AVN was done. Complete heart block was   achieved without a junctional escape rate of 40 beats per minute.    COMPLICATIONS: None.    Fluoroscopy (minutes):Dose 25.1mSv Fl Time:0min, DAP:6.3    CONCLUSION:  1. Uneventful ablation of AV node and  implantation of a device pacemaker.    Davion Baron MD

## 2024-07-12 NOTE — UTILIZATION REVIEW
Admission Status; Secondary Review Determination         Under the authority of the Utilization Management Committee, the utilization review process indicated a secondary review on the above patient.  The review outcome is based on review of the medical records, discussions with staff, and applying clinical experience noted on the date of the review.        (x)      Inpatient Status Appropriate - This patient's medical care is consistent with medical management for inpatient care and reasonable inpatient medical practice.        RATIONALE FOR DETERMINATION   The patient is a 89-year-old female who presents to the ED with significant worsening of weakness.  She has a recent diagnosis of UTI and was supposed to have been taking Keflex and continues to have large leukocyte Estrace and a culture positive for ,000 colonies of E. coli.  She was noted to have significantly elevated proBNP at 14,392 with a history of diastolic heart failure which is decompensated at this time.  She also has concomitant elevation in creatinine to 2.35 currently and slightly higher at admission.  Troponins have been elevated in the mid 40 to low 40 level.  She does have atrial fibs with rapid ventricular response and electrophysiology was consulted and recommends a AV node ablation given previous history and attempts at managing medically.  Ceftriaxone was started for UTI.  Patient also has underlying COPD and asthma and uses oxygen at night.  She has diabetes mellitus type 2.  Based on worsening renal status along with persisting UTI requiring IV antibiotic and decompensated diastolic heart failure with arrhythmia requiring procedural ablation, agree with inpatient status.      The severity of illness, intensity of service provided, expected LOS and risk for adverse outcome make the care complex, high risk and appropriate for hospital admission.        The information on this document is developed by the utilization review team in  order for the business office to ensure compliance.  This only denotes the appropriateness of proper admission status and does not reflect the quality of care rendered.         The definitions of Inpatient Status and Observation Status used in making the determination above are those provided in the CMS Coverage Manual, Chapter 1 and Chapter 6, section 70.4.      Sincerely,     Ronald Guthrie MD  Physician Advisor  Utilization Review/ Case Management  [facility].

## 2024-07-12 NOTE — H&P
[facility]    History and Physical - Hospitalist Service       Date of Admission:  7/11/2024    Assessment & Plan   Moira Andre is a 89 year old female who has history of PAF on anticoagulation, Chronic diastolic heart failure, NYHA class II, ELIGIO wears CPAP, CAD with PCI to OM/Dx in 2007, mild-mod Aortic stenosis, moderate mitral stenosis, Hyperlipidemia, Stage III CKD, Obesity, DM, RA, OA, Depression who presents with weakness.  She was diagnosed with a UTI and has been suppose to take keflex.  Her Troponin were elevated to 45, WBC is 10.1, hgb 13, EKG shows atrial fibrillation with resting HR of 111 in clinic. She was diagnosed with UTI at her clinic.  CXR revealed a full reticular interstitial pattern throughout the lungs  concerning for pulmonary edema and CHF.    ## Acute decompensated diastolic heart failure  ## Atrial fibrillation with elevated HR  Admit patient to inpatient cardiac bed with telemetry.  She has been admitted 3 times since 10/2023, most recently on 4/14/24 where she was thought to have COPD exacerbation that set of her atrial fibrillation with RVR.  Her dry weight at discharge in 12/2023 was 256 lbs.  Etiology of her CHF exacerbation include inadequate HR control, COPD exacerbation like her last admission but no active wheezing noted, non compliance with her CPAP, medical non compliance (hasn't taken her meds for a few days until she restarted last night/, ischemia,  Urinary tract infection and dietary non complaince,(had chinese buffet this afternoon).  Admit to cardiac telemetry bed  Hold torsemide start IV lasix 40 mg BID  Strict I/O, daily weights, dry weight 12/23 was 256 lbs  Repeat TTE  Continue metoprolol 75 mg BID  Continue eliquis    ## ASCVD with PCI OM/Dx in 2007  ## Valvular disease mild-mod Aortic stenosis, moderate mitral stenosis  ## Hyperlipidemia  She denies any chest pain, troponin mildly elevated and  Patient denies any chest pain, EKG shows  "atrial fibrillation.  Troponin is 45 and 39.  Patient is allergic to asa  Continue Plavix  Continue Lipitor  Trend troponin  NTG as needed    ## Urinary tract infection  She was noted to have foul smelling urine per home care.  She had an abnormal UA and suppose to be started on keflex   Ceftriaxone while admitted  To [facility] at discharge    ## TIFFANI in CKD stage III  She admits to not taking any of her medications for a few days and not eating much until today when she ate at a chinese buffet.   Check FENA  Hold torsemide  Monitor I/O and daily BMP    ## COPD / Asthma  ## ELIGIO  ## Obesity  She states she is using oxygen more at night time.  She is not needing supplemental oxygen hear and respiring comfortably.  She does not appear to have COPD exacerbation  Continue albuterol MDI and Advair  She has not been compliant with her CPAP machine and waited for many months and just recently got her cpap.  She wore it one day and it apparently \"fell apart\" and she has been unable to use this.  She does have home care.  CPAP at bedtime      ## DM Type II  Mod cho diet  Hold amaryl  Accu checks with med intensity sliding scale    ## Depression  Continue Wellbutrin and Cymbalta  She appears to be compensated    Diet:  low Na, mod CHO diet  DVT Prophylaxis: DOAC  Charles Catheter: Not present  Lines: None     Cardiac Monitoring: None  Code Status:  FULL    Clinically Significant Risk Factors Present on Admission               # Drug Induced Coagulation Defect: home medication list includes an anticoagulant medication   # Acute Kidney Injury, unspecified: based on a >150% or 0.3 mg/dL increase in last creatinine compared to past 90 day average, will monitor renal function  # Hypertension: Noted on problem list  # Chronic heart failure with preserved ejection fraction: heart failure noted on problem list and last echo with EF >50%       # DMII: A1C = 7.2 % (Ref range: 0.0 - 5.6 %) within past 6 months    # Obesity: Estimated body mass " "index is 39.47 kg/m  as calculated from the following:    Height as of this encounter: 1.702 m (5' 7\").    Weight as of this encounter: 114.3 kg (252 lb).       # Financial/Environmental Concerns:    # Asthma: noted on problem list        Disposition Plan     Medically Ready for Discharge: Anticipated in 2-4 Days      Sy Grimaldo MD  Hospitalist Service  [facility]  Securely message with [employer] (more info)  Text page via [employer] Paging/Directory     ______________________________________________________________________    Chief Complaint   HENDERSON, weakness and elevated troponin    History is obtained from the patient, electronic health record, and emergency department physician    History of Present Illness   Moira Andre is a 89 year old female who has history of PAF on anticoagulation, Chronic diastolic heart failure, NYHA class II, ELIGIO wears CPAP, CAD with PCI to OM/Dx in 2007, mild-mod Aortic stenosis, moderate mitral stenosis, Hyperlipidemia, Stage III CKD, Obesity, DM, RA, OA, Depression who presents with weakness.  She was diagnosed with a UTI and has been suppose to take keflex.  Her Troponin were elevated to 45, WBC is 10.1, hgb 13, EKG shows atrial fibrillation with resting HR of 111 in clinic. She was diagnosed with UTI at her clinic.  CXR revealed a full reticular interstitial pattern throughout the lungs  concerning for pulmonary edema and CHF.            Past Medical History    Past Medical History:   Diagnosis Date    Aortic valve sclerosis     heart murmur, no AS    Arrhythmia     PAT, PVC    Aspirin allergy     Plavix use long term    Asthma     CKD (chronic kidney disease) stage 3, GFR 30-59 ml/min (H)     x 2007 atleast    Congestive heart failure, unspecified     Depression     Diabetes mellitus (H) 2010    Diastolic dysfunction, left ventricle 2013    grade 2, nl ef    HTN (hypertension)     Lactic acidosis 08/2018    due to dehydration and metformin    Migraine " headache     Mitral stenosis     mild, 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    Myocardial infarction (H) 9/2007, cath 2013 ml    BMS: stent to OM, diag, nl EF, echo /C angia 2013 , f/u cath no lesion >40%    Nephrolithiasis     right side    OA (osteoarthritis) of knee     Obesity     Rheumatoid arthritis flare (H)     prednisone    Sleep apnea     restarted using cpap 2017    TIA (transient ischaemic attack)     Ventral hernia, unspecified, without mention of obstruction or gangrene        Past Surgical History   Past Surgical History:   Procedure Laterality Date    APPENDECTOMY      BIOPSY BREAST      x2 -needle & lumpectomy-benign    CHOLECYSTECTOMY      CORONARY ANGIOGRAPHY ADULT ORDER  9/28/2007    Bare metal stent to OM1, Diagonal patent     CORONARY ANGIOGRAPHY ADULT ORDER  9/25/2007    Friona stent to Diagonal    HC LEFT HEART CATHETERIZATION  8/2013    Moderate CAD    HYSTERECTOMY TOTAL ABDOMINAL      ORTHOPEDIC SURGERY      knee replacement on right side (2006), Left side (2016)    RELEASE CARPAL TUNNEL      right and left    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 Taking?   Cholecalciferol (VITAMIN D) 125 MCG (5000 UT) capsule  Self Yes No   Sig: Take 1 tablet by mouth daily   DULoxetine (CYMBALTA) 60 MG capsule  Self No No   Sig: Take 1 capsule (60 mg) by mouth daily   Menthol, Topical Analgesic, (ICY HOT MEDICATED SPRAY EX)  Self Yes No   Sig: Externally apply topically daily as needed (pain, in neck, back, hand)   Multiple Vitamins-Minerals (HAIR SKIN AND NAILS FORMULA PO)  Self Yes No   Sig: Take 1 tablet by mouth daily   acetaminophen (TYLENOL) 500 MG tablet  Self Yes No   Sig: Take 1,000 mg by mouth 2 times daily   albuterol (PROAIR HFA/PROVENTIL HFA/VENTOLIN HFA) 108 (90 Base) MCG/ACT inhaler  Self Yes No   Sig: Inhale 2 puffs into the lungs every 6 hours as needed for shortness of  breath, wheezing or cough For shortness of breath   apixaban ANTICOAGULANT (ELIQUIS) 2.5 MG tablet  Self No No   Sig: Take 1 tablet (2.5 mg) by mouth 2 times daily   atorvastatin (LIPITOR) 20 MG tablet  Self No No   Sig: Take 1 tablet (20 mg) by mouth daily for cholesterol   blood glucose (ACCU-CHEK GUIDE) test strip   No No   Sig: Use to test blood sugar once daily or as directed.   blood glucose (ACCU-CHEK SOFTCLIX) lancing device   No No   Sig: Lancing device to be used with lancets.   blood glucose monitoring (SOFTCLIX) lancets   No No   Sig: Use to test blood sugar once daily.   buPROPion (WELLBUTRIN SR) 100 MG 12 hr tablet  Self No No   Sig: Take 1 tablet (100 mg) by mouth daily for mood   calcium carbonate (TUMS) 500 MG chewable tablet  Self Yes No   Sig: Take 1 chew tab by mouth 2 times daily as needed for heartburn   cephALEXin (KEFLEX) 500 MG capsule   No No   Sig: Take 1 capsule (500 mg) by mouth 2 times daily   fexofenadine (ALLEGRA) 60 MG tablet  Self Yes No   Sig: Take 60 mg by mouth daily   fluticasone-salmeterol (WIXELA INHUB) 100-50 MCG/ACT inhaler  Self No No   Sig: USE 1 INHALATION BY MOUTH EVERY  12 HOURS   glimepiride (AMARYL) 2 MG tablet  Self No No   Sig: Take 1 tablet (2 mg) by mouth 2 times daily (before meals)   metoprolol tartrate (LOPRESSOR) 25 MG tablet  Self No No   Sig: Take 3 tablets (75 mg) by mouth 2 times daily   miconazole (MICATIN) 2 % external cream   Yes No   Sig: Apply topically 2 times daily as needed for other (rash/irritation)   nitroGLYcerin (NITROSTAT) 0.4 MG sublingual tablet  Self No No   Sig: FOR CHEST PAIN PLACE 1 TABLET  UNDER TONGUE EVERY 5 MINUTES FOR 3 DOSES. IF SYMPTOMS PERSIST 5  MINUTES AFTER 1ST DOSE CALL 911   pantoprazole (PROTONIX) 20 MG EC tablet  Self No No   Sig: Take 1 tablet (20 mg) by mouth daily   torsemide (DEMADEX) 20 MG tablet  Self No No   Sig: Take 2 tablets (40 mg) by mouth daily   triamcinolone (KENALOG) 0.1 % external cream   Yes No   Sig:  Apply topically 2 times daily as needed for irritation      Facility-Administered Medications: None           Physical Exam   Vital Signs: Temp: (!) 96  F (35.6  C)   BP: 126/81 Pulse: 86   Resp: 19 SpO2: 95 % O2 Device: None (Room air)    Weight: 252 lbs 0 oz    General Appearance: NAD, No respiratory distress, not on oxygen, AxOx3  Respiratory: mild bilateral ronchi, diminished, no wheezing, no rales  Cardiovascular: Irregularly irregular  GI: obese, soft, NT, +BS  Skin: +2 edema  Other: CN intact, non focal exam     Medical Decision Making       70 MINUTES SPENT BY ME on the date of service doing chart review, history, exam, documentation & further activities per the note.      Data     I have personally reviewed the following data over the past 24 hrs:    10.2  \   12.7   / 331     138 100 58.0 (H) /  169 (H)   4.2 24 2.50 (H) \     Trop: 39 (H) BNP: 14,392 (H)       Imaging results reviewed over the past 24 hrs:   Recent Results (from the past 24 hour(s))   XR Chest 2 Views    Narrative    EXAM: XR CHEST 2 VIEWS  LOCATION: [facility]  DATE: 7/11/2024    INDICATION: Shortness of breath  COMPARISON: 4/16/2024      Impression    IMPRESSION: No pleural fluid or pneumothorax. Reticular interstitial opacities throughout the lungs could be seen with edema. The cardiomediastinal silhouette is enlarged.

## 2024-07-13 ENCOUNTER — APPOINTMENT (OUTPATIENT)
Dept: OCCUPATIONAL THERAPY | Facility: CLINIC | Age: 89
DRG: 242 | End: 2024-07-13
Payer: COMMERCIAL

## 2024-07-13 ENCOUNTER — APPOINTMENT (OUTPATIENT)
Dept: GENERAL RADIOLOGY | Facility: CLINIC | Age: 89
DRG: 242 | End: 2024-07-13
Attending: INTERNAL MEDICINE
Payer: COMMERCIAL

## 2024-07-13 LAB
ANION GAP SERPL CALCULATED.3IONS-SCNC: 8 MMOL/L (ref 7–15)
BUN SERPL-MCNC: 47.1 MG/DL (ref 8–23)
CALCIUM SERPL-MCNC: 9.2 MG/DL (ref 8.2–9.6)
CHLORIDE SERPL-SCNC: 103 MMOL/L (ref 98–107)
CREAT SERPL-MCNC: 2.08 MG/DL (ref 0.51–0.95)
DEPRECATED HCO3 PLAS-SCNC: 28 MMOL/L (ref 22–29)
EGFRCR SERPLBLD CKD-EPI 2021: 22 ML/MIN/1.73M2
ERYTHROCYTE [DISTWIDTH] IN BLOOD BY AUTOMATED COUNT: 17.6 % (ref 10–15)
GLUCOSE BLDC GLUCOMTR-MCNC: 102 MG/DL (ref 70–99)
GLUCOSE BLDC GLUCOMTR-MCNC: 109 MG/DL (ref 70–99)
GLUCOSE BLDC GLUCOMTR-MCNC: 153 MG/DL (ref 70–99)
GLUCOSE BLDC GLUCOMTR-MCNC: 158 MG/DL (ref 70–99)
GLUCOSE SERPL-MCNC: 119 MG/DL (ref 70–99)
HCT VFR BLD AUTO: 39.6 % (ref 35–47)
HGB BLD-MCNC: 11.8 G/DL (ref 11.7–15.7)
MAGNESIUM SERPL-MCNC: 2.1 MG/DL (ref 1.7–2.3)
MCH RBC QN AUTO: 25.5 PG (ref 26.5–33)
MCHC RBC AUTO-ENTMCNC: 29.8 G/DL (ref 31.5–36.5)
MCV RBC AUTO: 86 FL (ref 78–100)
PLATELET # BLD AUTO: 330 10E3/UL (ref 150–450)
POTASSIUM SERPL-SCNC: 5 MMOL/L (ref 3.4–5.3)
RBC # BLD AUTO: 4.63 10E6/UL (ref 3.8–5.2)
SODIUM SERPL-SCNC: 139 MMOL/L (ref 135–145)
WBC # BLD AUTO: 10.8 10E3/UL (ref 4–11)

## 2024-07-13 PROCEDURE — 250N000011 HC RX IP 250 OP 636: Performed by: INTERNAL MEDICINE

## 2024-07-13 PROCEDURE — 85027 COMPLETE CBC AUTOMATED: CPT | Performed by: STUDENT IN AN ORGANIZED HEALTH CARE EDUCATION/TRAINING PROGRAM

## 2024-07-13 PROCEDURE — 250N000013 HC RX MED GY IP 250 OP 250 PS 637: Performed by: INTERNAL MEDICINE

## 2024-07-13 PROCEDURE — 99232 SBSQ HOSP IP/OBS MODERATE 35: CPT | Mod: 24 | Performed by: INTERNAL MEDICINE

## 2024-07-13 PROCEDURE — 80048 BASIC METABOLIC PNL TOTAL CA: CPT | Performed by: STUDENT IN AN ORGANIZED HEALTH CARE EDUCATION/TRAINING PROGRAM

## 2024-07-13 PROCEDURE — 36415 COLL VENOUS BLD VENIPUNCTURE: CPT | Performed by: STUDENT IN AN ORGANIZED HEALTH CARE EDUCATION/TRAINING PROGRAM

## 2024-07-13 PROCEDURE — 97535 SELF CARE MNGMENT TRAINING: CPT | Mod: GO

## 2024-07-13 PROCEDURE — 83735 ASSAY OF MAGNESIUM: CPT | Performed by: STUDENT IN AN ORGANIZED HEALTH CARE EDUCATION/TRAINING PROGRAM

## 2024-07-13 PROCEDURE — 250N000013 HC RX MED GY IP 250 OP 250 PS 637: Performed by: STUDENT IN AN ORGANIZED HEALTH CARE EDUCATION/TRAINING PROGRAM

## 2024-07-13 PROCEDURE — 99232 SBSQ HOSP IP/OBS MODERATE 35: CPT | Performed by: STUDENT IN AN ORGANIZED HEALTH CARE EDUCATION/TRAINING PROGRAM

## 2024-07-13 PROCEDURE — 210N000002 HC R&B HEART CARE

## 2024-07-13 PROCEDURE — 999N000065 XR CHEST 2 VIEWS

## 2024-07-13 RX ORDER — TORSEMIDE 20 MG/1
40 TABLET ORAL DAILY
Status: DISCONTINUED | OUTPATIENT
Start: 2024-07-13 | End: 2024-07-14 | Stop reason: HOSPADM

## 2024-07-13 RX ADMIN — PANTOPRAZOLE SODIUM 20 MG: 20 TABLET, DELAYED RELEASE ORAL at 08:19

## 2024-07-13 RX ADMIN — ACETAMINOPHEN 1000 MG: 500 TABLET, FILM COATED ORAL at 08:20

## 2024-07-13 RX ADMIN — ACETAMINOPHEN 650 MG: 325 TABLET, FILM COATED ORAL at 16:45

## 2024-07-13 RX ADMIN — FLUTICASONE FUROATE AND VILANTEROL TRIFENATATE 1 PUFF: 100; 25 POWDER RESPIRATORY (INHALATION) at 08:17

## 2024-07-13 RX ADMIN — MICONAZOLE NITRATE: 2 POWDER TOPICAL at 08:16

## 2024-07-13 RX ADMIN — METOPROLOL TARTRATE 75 MG: 50 TABLET, FILM COATED ORAL at 08:20

## 2024-07-13 RX ADMIN — FEXOFENADINE HYDROCHLORIDE 60 MG: 60 TABLET ORAL at 08:22

## 2024-07-13 RX ADMIN — APIXABAN 2.5 MG: 2.5 TABLET, FILM COATED ORAL at 23:03

## 2024-07-13 RX ADMIN — DULOXETINE HYDROCHLORIDE 60 MG: 60 CAPSULE, DELAYED RELEASE ORAL at 08:19

## 2024-07-13 RX ADMIN — BUPROPION HYDROCHLORIDE 100 MG: 100 TABLET, FILM COATED, EXTENDED RELEASE ORAL at 08:22

## 2024-07-13 RX ADMIN — TORSEMIDE 40 MG: 20 TABLET ORAL at 08:19

## 2024-07-13 RX ADMIN — Medication 1 TABLET: at 08:19

## 2024-07-13 RX ADMIN — CEFTRIAXONE SODIUM 1 G: 1 INJECTION, POWDER, FOR SOLUTION INTRAMUSCULAR; INTRAVENOUS at 23:03

## 2024-07-13 RX ADMIN — CEFTRIAXONE SODIUM 1 G: 1 INJECTION, POWDER, FOR SOLUTION INTRAMUSCULAR; INTRAVENOUS at 00:01

## 2024-07-13 RX ADMIN — APIXABAN 2.5 MG: 2.5 TABLET, FILM COATED ORAL at 08:19

## 2024-07-13 RX ADMIN — ACETAMINOPHEN 1000 MG: 500 TABLET, FILM COATED ORAL at 23:03

## 2024-07-13 RX ADMIN — METOPROLOL TARTRATE 75 MG: 50 TABLET, FILM COATED ORAL at 23:03

## 2024-07-13 RX ADMIN — ATORVASTATIN CALCIUM 20 MG: 20 TABLET, FILM COATED ORAL at 08:19

## 2024-07-13 ASSESSMENT — ACTIVITIES OF DAILY LIVING (ADL)
ADLS_ACUITY_SCORE: 34

## 2024-07-13 NOTE — PROGRESS NOTES
[facility]    Medicine Progress Note - Hospitalist Service    Date of Admission:  7/11/2024    Assessment & Plan     Moira Andre is a 89 year old female who has history of PAF on anticoagulation, Chronic diastolic heart failure, NYHA class II, ELIGIO wears CPAP, CAD with PCI to OM/Dx in 2007, mild-mod Aortic stenosis, moderate mitral stenosis, Hyperlipidemia, Stage III CKD, Obesity, DM, RA, OA, Depression who presents with weakness.  She was diagnosed with a UTI and has been suppose to take keflex.  Her Troponin were elevated to 45, WBC is 10.1, hgb 13, EKG shows atrial fibrillation with resting HR of 111 in clinic. She was diagnosed with UTI at her clinic.  CXR revealed a full reticular interstitial pattern throughout the lungs concerning for pulmonary edema and CHF. S/p single chamber ppm and AVN ablation 7/12/24.     Acute decompensated diastolic heart failure, improving  Atrial fibrillation with elevated HR  S/p single chamber ppm and AVN ablation 7/12/24  Admit patient to inpatient cardiac bed with telemetry.  She has been admitted 3 times since 10/2023, most recently on 4/14/24 where she was thought to have COPD exacerbation that set of her atrial fibrillation with RVR.  Her dry weight at discharge in 12/2023 was 256 lbs.  HF exacerbation suspected in setting of poor rate control. She also ate a very salty meal prior to presentation which could have contributed. Patient underwent single chamber ppm and AVN ablation on 7/12/24 without complication. Her creatinine is improving on 7/13. She is currently off oxygen.   Strict I/O, daily weights, dry weight 12/23 was 256 lbs  Continue metoprolol 75 mg BID  Continue eliquis  Resume PTA torsemide 40 mg daily on 7/13   If AM BMP stable/improving 7/14, patient will be medically stable for discharge      ASCVD with PCI OM/Dx in 2007  Valvular disease mild-mod Aortic stenosis, moderate mitral stenosis  Hyperlipidemia  She denies any chest pain,  "troponin mildly elevated and  Patient denies any chest pain, EKG shows atrial fibrillation.  Troponin is 45 and 39.  Patient is allergic to asa  Continue Plavix  Continue Lipitor  NTG as needed     Urinary tract infection  She was noted to have foul smelling urine per home care.  She had an abnormal UA and suppose to be started on keflex   Ceftriaxone while admitted  Plan to transition to Keflex at discharge     TIFFANI in CKD stage III  She admits to not taking any of her medications for a few days and not eating much until today when she ate at a chinese buffet. Creatinine trend 2.50, 2.35. Has been NPO most of day 7/12, going to hold diuresis currently.   Monitor I/O and daily BMP     COPD / Asthma  ELIGIO  Obesity  She states she is using oxygen more at night time.  She is not needing supplemental oxygen hear and respiring comfortably.  She does not appear to have COPD exacerbation  Continue albuterol MDI and Advair  She has not been compliant with her CPAP machine and waited for many months and just recently got her cpap.  She wore it one day and it apparently \"fell apart\" and she has been unable to use this.  She does have home care.  CPAP at bedtime     DM Type II  Mod cho diet  Hold amaryl  Accu checks with med intensity sliding scale     Depression  Continue Wellbutrin and Cymbalta  She appears to be compensated          Diet: Combination Diet Moderate Consistent Carb (60 g CHO per Meal) Diet; 2 gm NA Diet; Low Saturated Fat Na <2400mg Diet, No Caffeine Diet    DVT Prophylaxis: Pneumatic Compression Devices  Charles Catheter: Not present  Lines: PRESENT           Cardiac Monitoring: ACTIVE order. Indication: Post- EP procedure (48 hours)  Code Status: Full Code      Clinically Significant Risk Factors                  # Hypertension: Noted on problem list  # Acute heart failure with preserved ejection fraction: heart failure noted on problem list, last echo with EF >50%, and receiving IV diuretics              # " "DMII: A1C = 7.2 % (Ref range: 0.0 - 5.6 %) within past 6 months, PRESENT ON ADMISSION  # Obesity: Estimated body mass index is 39.16 kg/m  as calculated from the following:    Height as of this encounter: 1.702 m (5' 7\").    Weight as of this encounter: 113.4 kg (250 lb)., PRESENT ON ADMISSION       # Financial/Environmental Concerns: none  # Asthma: noted on problem list  # Pacemaker present       Disposition Plan     Medically Ready for Discharge: Anticipated Tomorrow         Jose Whittaker DO  Hospitalist Service  [facility]  Securely message with [employer] (more info)  Text page via [employer] Paging/Directory   ______________________________________________________________________    Interval History     - No acute events overnight  - Patient denies any significant symptoms this AM  - States she is bored  - She lives alone on ground level apartment, PT currently recommending TCU versus home with assistance  - No pain near pacemaker site  - No other acute concerns     Physical Exam   Vital Signs: Temp: 97.4  F (36.3  C) Temp src: Oral BP: 133/88 Pulse: 80   Resp: 16 SpO2: 94 % O2 Device: None (Room air) Oxygen Delivery: 1 LPM  Weight: 250 lbs 0 oz    Constitutional: awake, alert, cooperative, no apparent distress, and appears younger than stated age, obese  Eyes: Lids and lashes normal, pupils equal, round and reactive to light   ENT: Normocephalic, without obvious abnormality, atraumatic  Respiratory: No increased work of breathing, good air exchange, clear to auscultation bilaterally, no crackles or wheezing  Cardiovascular: Normal apical impulse, regular rate and rhythm, normal S1 and S2, no S3 or S4, and no murmur noted  GI: No scars, normal bowel sounds, soft, non-distended, non-tender, no masses palpated, no hepatosplenomegally  Skin: Left chest wall pacemaker site clean, dry, and intact   Musculoskeletal: There is no redness, warmth, or swelling of the joints.  Full range of motion " noted.  Motor strength is 5 out of 5 all extremities bilaterally.  Tone is normal.  Neurologic: Awake, alert, oriented to name, place and time.     Neuropsychiatric: General: normal, calm, and normal eye contact    Medical Decision Making       45 MINUTES SPENT BY ME on the date of service doing chart review, history, exam, documentation & further activities per the note.      Data   ------------------------- PAST 24 HR DATA REVIEWED -----------------------------------------------    I have personally reviewed the following data over the past 24 hrs:    10.8  \   11.8   / 330     139 103 47.1 (H) /  109 (H)   5.0 28 2.08 (H) \     Procal: N/A CRP: N/A Lactic Acid: 1.2         Imaging results reviewed over the past 24 hrs:   Recent Results (from the past 24 hour(s))   EP Device    Narrative    Table formatting from the original result was not included.  PROCEDURES PERFORMED:   1. Implantation of a single-chamber pacemaker with the ventricular lead  2. Conscious sedation.   3. Cardiac fluoroscopy  4. AV refugio ablation    INDICATION: medical refractory  AF    HISTORY OF PRESENT ILLNESS: This is a 89 year old year-old patient with a   history of persistent AF with RVR refractory to medical therapy. She is   referred for a ppm and AVN ablation.    METHOD: Informed consent was obtained and Intravenous antibiotic was given   prior to the procedure. The patient was prepped and draped in the usual   manner.  The procedure was performed under conscious sedation. 1%   lidocaine was infiltrated into the left axillary vein area. This vein was   accessed using the modified Seldinger technique and ultrasound guidance   when applicable with a micropuncture needle.  An incision was then made   with a #15 blade. A peelable sheath was then advanced over the guidewire.    A long sheath and the lead were then advanced into the heart.  Mapping of   the left bundle region was performed.  The lead was fixed into mid RV   septum area under  fluoroscopic guidance. Appropriate impedance sensing and   threshold were obtained. The long sheath was then cut away and the outer   sheath peeled away. The lead was then secured to the pectoralis muscle   fascia with O-Ethibond sutures.    A pocket was then fashioned and was irrigated with NS solution. The lead   was then attached to the device and placed in the pocket.  The pocket was   then closed with 2-0 and 4-0 Vicryl sutures. Steri-Strips and an OpSite   dressing were then placed over the incision.      The right groin was then prepped and draped the usual fashion. 1%   Lidocaine was infiltrated into the area. An 8-[place] sheath was placed in   the right femoral vein via the modified Seldinger's technique. A large   curve 5 mm ablation catheter was then advanced into the heart. Ablation   was then performed using an EPT generator. Both catheter and sheath were   then removed. Hemostasis was achieved by placement of a suture in a figure   of 8 configuration held by a stopcock. The patient was then transferred   back to the room in stable condition.     DEVICE INFORMATION:  Implant Name Type Inv. Item Serial No.  Lot No. LRB No. Used   Action   LEAD PACEMAKER SELECTSECURE 69CM MD  3830-69 - [id number] Leads LEAD   PACEMAKER SELECTSECURE 69CM MD  3830-69 [id number] [employer]   [id number]  1 Implanted   PACEMAKER HANNAH XT SR MRI SYS - [id number] Pacemaker PACEMAKER HANNAH XT SR   MRI SYS [id number] [employer] [id number]  1 Implanted       STIMULATION THRESHOLDS:    RV -  Sense:5.25 mV, Threshold: 1.375 V @ 0.4-0.5 ms, Impedance: 779 ohms      ABLATION SUMMARY: Approximately 3 applications of radiofrequency ablation   were targeted at the AV node at 50 Lo and 60 degrees Celsius for 10-60   seconds after mapping of the AVN was done. Complete heart block was   achieved without a junctional escape rate of 40 beats per minute.    COMPLICATIONS: None.    Fluoroscopy (minutes):Dose 25.1mSv  Fl Time:0min, DAP:6.3    CONCLUSION:  1. Uneventful ablation of AV node and implantation of a device pacemaker.    Davion Baron MD                EP Ablation    Narrative    Table formatting from the original result was not included.  PROCEDURES PERFORMED:   1. Implantation of a single-chamber pacemaker with the ventricular lead  2. Conscious sedation.   3. Cardiac fluoroscopy  4. AV refugio ablation    INDICATION: medical refractory  AF    HISTORY OF PRESENT ILLNESS: This is a 89 year old year-old patient with a   history of persistent AF with RVR refractory to medical therapy. She is   referred for a ppm and AVN ablation.    METHOD: Informed consent was obtained and Intravenous antibiotic was given   prior to the procedure. The patient was prepped and draped in the usual   manner.  The procedure was performed under conscious sedation. 1%   lidocaine was infiltrated into the left axillary vein area. This vein was   accessed using the modified Seldinger technique and ultrasound guidance   when applicable with a micropuncture needle.  An incision was then made   with a #15 blade. A peelable sheath was then advanced over the guidewire.    A long sheath and the lead were then advanced into the heart.  Mapping of   the left bundle region was performed.  The lead was fixed into mid RV   septum area under fluoroscopic guidance. Appropriate impedance sensing and   threshold were obtained. The long sheath was then cut away and the outer   sheath peeled away. The lead was then secured to the pectoralis muscle   fascia with O-Ethibond sutures.    A pocket was then fashioned and was irrigated with NS solution. The lead   was then attached to the device and placed in the pocket.  The pocket was   then closed with 2-0 and 4-0 Vicryl sutures. Steri-Strips and an OpSite   dressing were then placed over the incision.      The right groin was then prepped and draped the usual fashion. 1%   Lidocaine was infiltrated into the area. An  8-[place] sheath was placed in   the right femoral vein via the modified Seldinger's technique. A large   curve 5 mm ablation catheter was then advanced into the heart. Ablation   was then performed using an EPT generator. Both catheter and sheath were   then removed. Hemostasis was achieved by placement of a suture in a figure   of 8 configuration held by a stopcock. The patient was then transferred   back to the room in stable condition.     DEVICE INFORMATION:  Implant Name Type Inv. Item Serial No.  Lot No. LRB No. Used   Action   LEAD PACEMAKER SELECTSECURE 69CM MD  3830-69 - [id number] Leads LEAD   PACEMAKER SELECTSECURE 69CM MD  3830-69 [id number] [employer]   [id number]  1 Implanted   PACEMAKER HANNAH XT SR MRI SYS - [id number] Pacemaker PACEMAKER HANNAH XT SR   MRI SYS [id number] [employer] [id number]  1 Implanted       STIMULATION THRESHOLDS:    RV -  Sense:5.25 mV, Threshold: 1.375 V @ 0.4-0.5 ms, Impedance: 779 ohms      ABLATION SUMMARY: Approximately 3 applications of radiofrequency ablation   were targeted at the AV node at 50 Lo and 60 degrees Celsius for 10-60   seconds after mapping of the AVN was done. Complete heart block was   achieved without a junctional escape rate of 40 beats per minute.    COMPLICATIONS: None.    Fluoroscopy (minutes):Dose 25.1mSv Fl Time:0min, DAP:6.3    CONCLUSION:  1. Uneventful ablation of AV node and implantation of a device pacemaker.    Davion Baron MD                X-ray Chest 2 vws*    Narrative    EXAM: XR CHEST 2 VIEWS  LOCATION: [facility]  DATE: 7/13/2024    INDICATION: Status post pacer ICD  COMPARISON: 7/11/2024      Impression    IMPRESSION: Left subclavian approach pacing device. Cardiac silhouette within normal limits. Small bilateral pleural effusions. No pneumothorax.

## 2024-07-13 NOTE — PLAN OF CARE
Goal Outcome Evaluation:    VSS. Monitor shows V.Paced rhythm. Pt. Is alert, oriented, very pleasant. Pt. Denies pain. Left chest pacer dressing CDI. Pt. Sat up in chair. Ambulates to bathroom with one assist, gait belt, walker. Plan for probable discharge to home tomorrow.

## 2024-07-13 NOTE — PLAN OF CARE
Goal Outcome Evaluation:      Plan of Care Reviewed With: patient        Shift:[id number]       Procedures This Admit: S/p single chamber ppm and AVN ablation 24    Drains & Devices:  PIVSL  Rhythm:  100% Vpaced  Activity Level: AX1 gbw  Oxygen needs: 2L NC  Important Labs Since Admit: Latic acid 1.2,Cr2.35,Trop 41 K+ 4.3  Significant Echo results: N/A  Anticipated D/C Date: Pending re-evaluation need for diuresis on     Acuity Ratin (Low Acuity)  Requiring Extra Time? (Please explain): N/A    Other Concerns:pt had a permanent paced maker,single chamber, place on the left upper chest on ,site is CDI,right groin site clean transparent dressing with gauze CDI.  Nurse: Melanie Campbell RN

## 2024-07-13 NOTE — PLAN OF CARE
A&Ox4, denies pain or SOB. Tele 100% Vpaced, VSS on 1L NC. S/p AV node ablation and PPM insertion today. Right groin with gauze and tegaderm, CDI. PPM site WDL, CMS intact. Plan for discharge home next couple days.

## 2024-07-13 NOTE — PROGRESS NOTES
"Cardiac Electrophysiology Progress Note          Assessment and Plan:     Acute on chronic congestive heart failure with pEF, likely triggered by persistent AF with RVR, s/p AVN ablation and ppm. Normal cxr and device check. No pocket hematoma. Renal function improving. Cont with eliquis and bb for htn. Will call pt to schedule device follow up. Please call for questions.                 Interval History:     doing well; no cp, sob, n/v/d, or abd pain.              Review of Systems:   As per subjective, otherwise 5 systems reviewed and negative.           Physical Exam:   Blood pressure 133/88, pulse 80, temperature 97.4  F (36.3  C), temperature source Oral, resp. rate 16, height 1.702 m (5' 7\"), weight 113.4 kg (250 lb), SpO2 94%, not currently breastfeeding.          Intake/Output Summary (Last 24 hours) at 7/13/2024 1139  Last data filed at 7/13/2024 0800  Gross per 24 hour   Intake 720 ml   Output 750 ml   Net -30 ml       Constitutional:   NAD   Skin:   Warm and dry   Head:   Nontraumatic   Neck:   Supple, symmetrical, trachea midline, no adenopathy, thyroid symmetric, not enlarged and no tenderness, skin normal   Lungs:   normal   Cardiovascular:   Normal apical impulse, regular rate and rhythm, normal S1 and S2, no S3 or S4, and no murmur noted Normal apical impulse, regular rate and rhythm, normal S1 and S2, no S3 or S4, and no murmur noted   Abdomen:   Benign   Extremities and Back:   Symmetric, no curvature, spinous processes are non-tender on palpation, paraspinous muscles are non-tender on palpation, no costal vertebral tenderness   Neurological:   Grossly nonfocal            Medications:     Current Facility-Administered Medications   Medication Dose Route Frequency Provider Last Rate Last Admin    acetaminophen (TYLENOL) tablet 1,000 mg  1,000 mg Oral BID Sy Grimaldo MD   1,000 mg at 07/13/24 0820    apixaban ANTICOAGULANT (ELIQUIS) tablet 2.5 mg  2.5 mg Oral BID Sy Grimaldo MD   2.5 " mg at 07/13/24 0819    atorvastatin (LIPITOR) tablet 20 mg  20 mg Oral Daily Sy Grimaldo MD   20 mg at 07/13/24 0819    buPROPion (WELLBUTRIN SR) 12 hr tablet 100 mg  100 mg Oral Daily Sy Grimaldo MD   100 mg at 07/13/24 0822    cefTRIAXone (ROCEPHIN) 1 g vial to attach to  mL bag for ADULTS or NS 50 mL bag for PEDS  1 g Intravenous Q24H Sy Grimaldo MD   1 g at 07/13/24 0001    DULoxetine (CYMBALTA) DR capsule 60 mg  60 mg Oral Daily Sy Grimaldo MD   60 mg at 07/13/24 0819    fexofenadine (ALLEGRA) tablet 60 mg  60 mg Oral Daily Sy Grimaldo MD   60 mg at 07/13/24 0822    fluticasone-vilanterol (BREO ELLIPTA) 100-25 MCG/ACT inhaler 1 puff  1 puff Inhalation Daily Sy Grimaldo MD   1 puff at 07/13/24 0817    insulin aspart (NovoLOG) injection (RAPID ACTING)  1-7 Units Subcutaneous TID AC Sy Grimaldo MD        insulin aspart (NovoLOG) injection (RAPID ACTING)  1-5 Units Subcutaneous At Bedtime Sy Grimlado MD        metoprolol tartrate (LOPRESSOR) tablet 75 mg  75 mg Oral BID Sy Grimaldo MD   75 mg at 07/13/24 0820    miconazole (MICATIN) 2 % powder   Topical BID Sy Grimaldo MD   Given at 07/13/24 0816    multivitamin w/minerals (THERA-VIT-M) tablet   Oral Daily Sy Grimaldo MD   1 tablet at 07/13/24 0819    pantoprazole (PROTONIX) EC tablet 20 mg  20 mg Oral Daily Sy Grimaldo MD   20 mg at 07/13/24 0819    sodium chloride (PF) 0.9% PF flush 3 mL  3 mL Intracatheter Q8H Sy Grimaldo MD   3 mL at 07/13/24 0817    torsemide (DEMADEX) tablet 40 mg  40 mg Oral Daily Jose Whittaker DO   40 mg at 07/13/24 0819     Office Visit on 07/11/2024   Component Date Value Ref Range Status    WBC Count 07/11/2024 10.1  4.0 - 11.0 10e3/uL Final    RBC Count 07/11/2024 5.13  3.80 - 5.20 10e6/uL Final    Hemoglobin 07/11/2024 13.0  11.7 - 15.7 g/dL Final    Hematocrit 07/11/2024 43.4  35.0 - 47.0 % Final    MCV 07/11/2024 85  78  - 100 fL Final    MCH 07/11/2024 25.3 (L)  26.5 - 33.0 pg Final    MCHC 07/11/2024 30.0 (L)  31.5 - 36.5 g/dL Final    RDW 07/11/2024 17.1 (H)  10.0 - 15.0 % Final    Platelet Count 07/11/2024 336  150 - 450 10e3/uL Final    Color Urine 07/11/2024 Yellow  Colorless, Straw, Light Yellow, Yellow Final    Appearance Urine 07/11/2024 Cloudy (A)  Clear Final    Glucose Urine 07/11/2024 Negative  Negative mg/dL Final    Bilirubin Urine 07/11/2024 Negative  Negative Final    Ketones Urine 07/11/2024 Negative  Negative mg/dL Final    Specific Gravity Urine 07/11/2024 1.015  1.003 - 1.035 Final    Blood Urine 07/11/2024 Moderate (A)  Negative Final    pH Urine 07/11/2024 5.5  5.0 - 7.0 Final    Protein Albumin Urine 07/11/2024 100 (A)  Negative mg/dL Final    Urobilinogen Urine 07/11/2024 0.2  0.2, 1.0 E.U./dL Final    Nitrite Urine 07/11/2024 Negative  Negative Final    Leukocyte Esterase Urine 07/11/2024 Large (A)  Negative Final    Troponin T, High Sensitivity 07/11/2024 45 (H)  <=14 ng/L Final    Either a High Sensitivity Troponin T baseline (0 hours) value = 100 ng/L, or an increase in High Sensitivity Troponin T = 7 ng/L at 2 hours compared to 0 hours (2-0 hours), suggests myocardial injury, and urgent clinical attention is required.    If the 2-0 hours increase is <7 ng/L, a High Sensitivity Troponin T result above gender-specific reference ranges warrants further evaluation.   Recommendations for further evaluation include correlation with clinical decision-making tool (e.g., HEART), a 3rd High Sensitivity Troponin T test 2 hours after the 2nd (a 20% change from baseline would represent concern), admission for observation, close PCC/cardiology follow-up, or urgent outpatient provocative testing.    Bacteria Urine 07/11/2024 Many (A)  None Seen /HPF Final    RBC Urine 07/11/2024 0-2  0-2 /HPF /HPF Final    WBC Urine 07/11/2024 >100 (A)  0-5 /HPF /HPF Final    Squamous Epithelials Urine 07/11/2024 Few (A)  None Seen  /INGRID Final    Culture 07/11/2024 50,000-100,000 CFU/mL Escherichia coli (A)   Final                  Davion MD Lor[place]

## 2024-07-14 ENCOUNTER — APPOINTMENT (OUTPATIENT)
Dept: OCCUPATIONAL THERAPY | Facility: CLINIC | Age: 89
DRG: 242 | End: 2024-07-14
Payer: COMMERCIAL

## 2024-07-14 ENCOUNTER — APPOINTMENT (OUTPATIENT)
Dept: PHYSICAL THERAPY | Facility: CLINIC | Age: 89
DRG: 242 | End: 2024-07-14
Payer: COMMERCIAL

## 2024-07-14 VITALS
HEIGHT: 67 IN | BODY MASS INDEX: 39.24 KG/M2 | TEMPERATURE: 97.7 F | OXYGEN SATURATION: 98 % | RESPIRATION RATE: 16 BRPM | DIASTOLIC BLOOD PRESSURE: 69 MMHG | HEART RATE: 82 BPM | WEIGHT: 250 LBS | SYSTOLIC BLOOD PRESSURE: 124 MMHG

## 2024-07-14 LAB
ANION GAP SERPL CALCULATED.3IONS-SCNC: 13 MMOL/L (ref 7–15)
BUN SERPL-MCNC: 42.1 MG/DL (ref 8–23)
CALCIUM SERPL-MCNC: 9.3 MG/DL (ref 8.2–9.6)
CHLORIDE SERPL-SCNC: 102 MMOL/L (ref 98–107)
CREAT SERPL-MCNC: 1.97 MG/DL (ref 0.51–0.95)
DEPRECATED HCO3 PLAS-SCNC: 25 MMOL/L (ref 22–29)
EGFRCR SERPLBLD CKD-EPI 2021: 24 ML/MIN/1.73M2
ERYTHROCYTE [DISTWIDTH] IN BLOOD BY AUTOMATED COUNT: 17.2 % (ref 10–15)
GLUCOSE BLDC GLUCOMTR-MCNC: 128 MG/DL (ref 70–99)
GLUCOSE BLDC GLUCOMTR-MCNC: 131 MG/DL (ref 70–99)
GLUCOSE SERPL-MCNC: 117 MG/DL (ref 70–99)
HCT VFR BLD AUTO: 40.9 % (ref 35–47)
HGB BLD-MCNC: 12.4 G/DL (ref 11.7–15.7)
MAGNESIUM SERPL-MCNC: 2 MG/DL (ref 1.7–2.3)
MCH RBC QN AUTO: 26.1 PG (ref 26.5–33)
MCHC RBC AUTO-ENTMCNC: 30.3 G/DL (ref 31.5–36.5)
MCV RBC AUTO: 86 FL (ref 78–100)
PLATELET # BLD AUTO: 367 10E3/UL (ref 150–450)
POTASSIUM SERPL-SCNC: 4.5 MMOL/L (ref 3.4–5.3)
RBC # BLD AUTO: 4.75 10E6/UL (ref 3.8–5.2)
SODIUM SERPL-SCNC: 140 MMOL/L (ref 135–145)
WBC # BLD AUTO: 11.9 10E3/UL (ref 4–11)

## 2024-07-14 PROCEDURE — 250N000013 HC RX MED GY IP 250 OP 250 PS 637: Performed by: STUDENT IN AN ORGANIZED HEALTH CARE EDUCATION/TRAINING PROGRAM

## 2024-07-14 PROCEDURE — 83735 ASSAY OF MAGNESIUM: CPT | Performed by: STUDENT IN AN ORGANIZED HEALTH CARE EDUCATION/TRAINING PROGRAM

## 2024-07-14 PROCEDURE — 97116 GAIT TRAINING THERAPY: CPT | Mod: GP

## 2024-07-14 PROCEDURE — 85027 COMPLETE CBC AUTOMATED: CPT | Performed by: STUDENT IN AN ORGANIZED HEALTH CARE EDUCATION/TRAINING PROGRAM

## 2024-07-14 PROCEDURE — 99239 HOSP IP/OBS DSCHRG MGMT >30: CPT | Performed by: STUDENT IN AN ORGANIZED HEALTH CARE EDUCATION/TRAINING PROGRAM

## 2024-07-14 PROCEDURE — 36415 COLL VENOUS BLD VENIPUNCTURE: CPT | Performed by: STUDENT IN AN ORGANIZED HEALTH CARE EDUCATION/TRAINING PROGRAM

## 2024-07-14 PROCEDURE — 80048 BASIC METABOLIC PNL TOTAL CA: CPT | Performed by: STUDENT IN AN ORGANIZED HEALTH CARE EDUCATION/TRAINING PROGRAM

## 2024-07-14 PROCEDURE — 97535 SELF CARE MNGMENT TRAINING: CPT | Mod: GO

## 2024-07-14 PROCEDURE — 250N000013 HC RX MED GY IP 250 OP 250 PS 637: Performed by: INTERNAL MEDICINE

## 2024-07-14 PROCEDURE — 97530 THERAPEUTIC ACTIVITIES: CPT | Mod: GP

## 2024-07-14 RX ADMIN — ACETAMINOPHEN 1000 MG: 500 TABLET, FILM COATED ORAL at 08:39

## 2024-07-14 RX ADMIN — BUPROPION HYDROCHLORIDE 100 MG: 100 TABLET, FILM COATED, EXTENDED RELEASE ORAL at 08:38

## 2024-07-14 RX ADMIN — DULOXETINE HYDROCHLORIDE 60 MG: 60 CAPSULE, DELAYED RELEASE ORAL at 08:38

## 2024-07-14 RX ADMIN — FEXOFENADINE HYDROCHLORIDE 60 MG: 60 TABLET ORAL at 08:38

## 2024-07-14 RX ADMIN — PANTOPRAZOLE SODIUM 20 MG: 20 TABLET, DELAYED RELEASE ORAL at 08:39

## 2024-07-14 RX ADMIN — Medication 1 TABLET: at 08:39

## 2024-07-14 RX ADMIN — METOPROLOL TARTRATE 75 MG: 50 TABLET, FILM COATED ORAL at 08:39

## 2024-07-14 RX ADMIN — FLUTICASONE FUROATE AND VILANTEROL TRIFENATATE 1 PUFF: 100; 25 POWDER RESPIRATORY (INHALATION) at 08:36

## 2024-07-14 RX ADMIN — TORSEMIDE 40 MG: 20 TABLET ORAL at 08:44

## 2024-07-14 RX ADMIN — APIXABAN 2.5 MG: 2.5 TABLET, FILM COATED ORAL at 08:39

## 2024-07-14 RX ADMIN — ATORVASTATIN CALCIUM 20 MG: 20 TABLET, FILM COATED ORAL at 08:39

## 2024-07-14 RX ADMIN — MICONAZOLE NITRATE: 2 POWDER TOPICAL at 08:29

## 2024-07-14 RX ADMIN — OXYCODONE HYDROCHLORIDE AND ACETAMINOPHEN 1 TABLET: 5; 325 TABLET ORAL at 00:07

## 2024-07-14 ASSESSMENT — ACTIVITIES OF DAILY LIVING (ADL)
ADLS_ACUITY_SCORE: 34

## 2024-07-14 NOTE — PLAN OF CARE
Problem: Adult Inpatient Plan of Care  Goal: Plan of Care Review  Description: The Plan of Care Review/Shift note should be completed every shift.  The Outcome Evaluation is a brief statement about your assessment that the patient is improving, declining, or no change.  This information will be displayed automatically on your shift  note.  Outcome: Progressing  Flowsheets (Taken 7/14/2024 0532)  Plan of Care Reviewed With: patient  Overall Patient Progress: improving     Problem: Risk for Delirium  Goal: Optimal Coping  Outcome: Progressing  Intervention: Optimize Psychosocial Adjustment to Delirium  Recent Flowsheet Documentation  Taken 7/14/2024 0000 by Yane Singh RN  Supportive Measures:   active listening utilized   relaxation techniques promoted   verbalization of feelings encouraged  Goal: Improved Attention and Thought Clarity  Outcome: Progressing  Intervention: Maximize Cognitive Function  Recent Flowsheet Documentation  Taken 7/14/2024 0000 by Yane Singh RN  Sensory Stimulation Regulation:   care clustered   lighting decreased   quiet environment promoted   auditory stimulation minimized  Reorientation Measures:   calendar in view   clock in view  Goal: Improved Sleep  Outcome: Progressing     Problem: Fall Injury Risk  Goal: Absence of Fall and Fall-Related Injury  Outcome: Progressing  Intervention: Identify and Manage Contributors  Recent Flowsheet Documentation  Taken 7/14/2024 0000 by Yane Singh RN  Self-Care Promotion: independence encouraged  Medication Review/Management:   medications reviewed   high-risk medications identified  Intervention: Promote Injury-Free Environment  Recent Flowsheet Documentation  Taken 7/14/2024 0000 by Yane Singh RN  Safety Promotion/Fall Prevention:   activity supervised   clutter free environment maintained   nonskid shoes/slippers when out of bed   safety round/check completed     Problem: Adult Inpatient Plan of Care  Goal: Absence of Hospital-Acquired Illness  or Injury  Intervention: Identify and Manage Fall Risk  Recent Flowsheet Documentation  Taken 7/14/2024 0000 by Yane Singh RN  Safety Promotion/Fall Prevention:   activity supervised   clutter free environment maintained   nonskid shoes/slippers when out of bed   safety round/check completed  Intervention: Prevent Skin Injury  Recent Flowsheet Documentation  Taken 7/14/2024 0315 by Yane Singh RN  Body Position:   turned   legs elevated   upper extremity elevated   weight shifting  Taken 7/14/2024 0007 by Yane Singh RN  Body Position:   turned   right   heels elevated   side-lying 30 degrees   upper extremity elevated   weight shifting  Taken 7/14/2024 0000 by Yane Singh RN  Skin Protection: incontinence pads utilized  Device Skin Pressure Protection:   absorbent pad utilized/changed   positioning supports utilized   adhesive use limited  Intervention: Prevent Infection  Recent Flowsheet Documentation  Taken 7/14/2024 0000 by Yane Singh RN  Infection Prevention:   cohorting utilized   hand hygiene promoted   personal protective equipment utilized   rest/sleep promoted   single patient room provided   environmental surveillance performed  Goal: Optimal Comfort and Wellbeing  Intervention: Monitor Pain and Promote Comfort  Recent Flowsheet Documentation  Taken 7/14/2024 0315 by Yane Singh RN  Pain Management Interventions:   pillow support provided   cold applied  Taken 7/14/2024 0055 by Yane Singh RN  Pain Management Interventions: cold applied  Taken 7/14/2024 0007 by Yane Singh RN  Pain Management Interventions: medication (see MAR)  Intervention: Provide Person-Centered Care  Recent Flowsheet Documentation  Taken 7/14/2024 0000 by Yane Singh RN  Trust Relationship/Rapport:   care explained   choices provided   questions answered   questions encouraged   reassurance provided   thoughts/feelings acknowledged     Problem: Risk for Delirium  Goal: Improved Behavioral Control  Intervention: Prevent and Manage  Agitation  Recent Flowsheet Documentation  Taken 7/14/2024 0000 by Yane Singh RN  Environment Familiarity/Consistency: daily routine followed  Intervention: Minimize Safety Risk  Recent Flowsheet Documentation  Taken 7/14/2024 0000 by Yane Singh RN  Communication Enhancement Strategies:   call light answered in person   communication board used   extra time allowed for response   repetition utilized  Enhanced Safety Measures:   assistive devices when indicated   pain management   review medications for side effects with activity   patient/family teach back on injury risk   room near unit station  Trust Relationship/Rapport:   care explained   choices provided   questions answered   questions encouraged   reassurance provided   thoughts/feelings acknowledged   Goal Outcome Evaluation:      Plan of Care Reviewed With: patient    Overall Patient Progress: improvingOverall Patient Progress: improving     Patient observed sleeping most the shift, c/o incisional pain 4/10 , PRN Percocet and ice pack given with some relief.Telemetry reviewed and demonstrate 100% V Paced.Ambulates Ax1 with the walker/gaitbelt.VS WNL. Moister rush groin area/under breast, powder applied.Appropriate interventions for patient's safety remain in place.

## 2024-07-14 NOTE — PLAN OF CARE
Goal Outcome Evaluation:    Pt. Discharged to home. Transportation per daughter-in-law. Discharge instructions/AVS reviewed with pt. Pt. Verbalizes understanding of instructions.

## 2024-07-14 NOTE — PLAN OF CARE
Physical Therapy Discharge Summary    Reason for therapy discharge:    Discharged to home with home therapy.    Progress towards therapy goal(s). See goals on Care Plan in [facility] electronic health record for goal details.  Goals partially met.  Barriers to achieving goals:   discharge from facility.    Therapy recommendation(s):    Continued therapy is recommended.  Rationale/Recommendations:  TCU was recommended, pt opting for discharge to home with home services.

## 2024-07-14 NOTE — PROGRESS NOTES
Care Management Discharge Note    Discharge Date: 07/14/2024       Discharge Disposition: Home Care    Discharge Services: None    Discharge DME: None    Discharge Transportation: family or friend will provide    Private pay costs discussed: Not applicable    Does the patient's insurance plan have a 3 day qualifying hospital stay waiver?  No    PAS Confirmation Code:    Patient/family educated on Medicare website which has current facility and service quality ratings: no    Education Provided on the Discharge Plan: Yes  Persons Notified of Discharge Plans: [facility]  Patient/Family in Agreement with the Plan: yes    Handoff Referral Completed: Yes    Additional Information:  Writer notified [facility] via phone regarding discharge today.     Almaz Ceja RN   [facility]   Phone [phone], Vocera or [phone]

## 2024-07-14 NOTE — DISCHARGE SUMMARY
"[facility]  Hospitalist Discharge Summary      Date of Admission:  7/11/2024  Date of Discharge:  7/14/2024  Discharging Provider: Jose Whittaker DO  Discharge Service: Hospitalist Service    Discharge Diagnoses     Acute decompensated diastolic heart failure, resolved   S/p single chamber ppm and AVN ablation 7/12/24  ASCVD with PCI OM/Dx in 2007  Valvular disease mild-mod Aortic stenosis, moderate mitral stenosis  Hyperlipidemia  Urinary tract infection  TIFFANI in CKD stage III  COPD / Asthma  ELIGIO  Obesity  DM Type II  Depression    Clinically Significant Risk Factors     # DMII: A1C = 7.2 % (Ref range: 0.0 - 5.6 %) within past 6 months    # Obesity: Estimated body mass index is 39.16 kg/m  as calculated from the following:    Height as of this encounter: 1.702 m (5' 7\").    Weight as of this encounter: 113.4 kg (250 lb).       Follow-ups Needed After Discharge   Follow-up Appointments     Follow-up and recommended labs and tests       - Please follow up with your primary care provider within 1-2 weeks of   discharge for a repeat basic metabolic panel to ensure your kidney   function is raul/improving  - Please follow up with cardiology as instructed for your new pacemaker  - Resume the Keflex you were taking prior to admission to finish treatment   for a urinary tract infection            Unresulted Labs Ordered in the Past 30 Days of this Admission       No orders found from 6/11/2024 to 7/12/2024.          Discharge Disposition   Discharged to home  Condition at discharge: Stable    Hospital Course     Moira Andre is a 89 year old female who has history of PAF on anticoagulation, Chronic diastolic heart failure, NYHA class II, ELIGIO wears CPAP, CAD with PCI to OM/Dx in 2007, mild-mod Aortic stenosis, moderate mitral stenosis, Hyperlipidemia, Stage III CKD, Obesity, DM, RA, OA, Depression who presents with weakness.  She was diagnosed with a UTI and has been suppose to take keflex.  " Her Troponin were elevated to 45, WBC is 10.1, hgb 13, EKG shows atrial fibrillation with resting HR of 111 in clinic. She was diagnosed with UTI at her clinic.  CXR revealed a full reticular interstitial pattern throughout the lungs concerning for pulmonary edema and CHF. Sent to [facility] and admitted for heart failure exacerbation in setting of uncontrolled atrial fibrillation and possibly excessive salt intake. S/p single chamber ppm and AVN ablation 7/12/24. Discharging home with home services on 7/14/24. Hospital course by problem below.      Acute decompensated diastolic heart failure, improving  Atrial fibrillation with elevated HR  S/p single chamber ppm and AVN ablation 7/12/24  Type 2 MI in setting of uncontrolled atrial fibrillation   Admit patient to inpatient cardiac bed with telemetry.  She has been admitted 3 times since 10/2023, most recently on 4/14/24 where she was thought to have COPD exacerbation that caused her atrial fibrillation with RVR.  Her dry weight at discharge in 12/2023 was 256 lbs.  HF exacerbation suspected in setting of poor atrial fibrillation rate control. She also ate a very salty meal prior to presentation which could have contributed. Labs on admission significant for BNP of 14,392, Creatinine of 2.5, Troponin trend 45, 39. Patient underwent single chamber ppm and AVN ablation on 7/12/24 without complication. Her creatinine improved after pacemaker placement and continued to improve with resumption of her PTA diuretic. Creatinine is 1.9 at time of discharge. Most recent baseline around 1.5.   Follow up with cardiology as instructed  Wound care of pacemaker site per cardiology  Continue metoprolol 75 mg BID  Continue eliquis  Continue PTA torsemide, please repeat BMP within 1 week of discharge to ensure stable/improving creatinine, this can be done by home nurse   Low salt carb consistent diet on discharge      ASCVD with PCI OM/Dx in 2007  Valvular disease mild-mod Aortic  "stenosis, moderate mitral stenosis  Hyperlipidemia  She denies any chest pain, troponin mildly elevated and  Patient denies any chest pain, EKG shows atrial fibrillation.  Troponin is 45 and 39.  Patient is allergic to asa  Continue Plavix  Continue Lipitor  NTG as needed     Urinary tract infection  She was noted to have foul smelling urine per home care.  She had an abnormal UA and suppose to be started on keflex   Ceftriaxone while admitted  Resume prior to admission keflex on discharge     TIFFANI in CKD stage III  She admits to not taking any of her medications for a few days and not eating much until today when she ate at a chinese buffet. Creatinine trend 2.50, 2.35, 2.08, 1.92.  - Repeat BMP within one week of discharge while on PTA medication regiment      COPD / Asthma  ELIGIO  Obesity  She states she is using oxygen more at night time.  She is not needing supplemental oxygen hear and respiring comfortably.  She does not appear to have COPD exacerbation  Continue albuterol MDI and Advair  She has not been compliant with her CPAP machine and waited for many months and just recently got her cpap.  She wore it one day and it apparently \"fell apart\" and she has been unable to use this.  She does have home care.  CPAP at bedtime     DM Type II  Mod cho diet  Hold amaryl  Accu checks with med intensity sliding scale during admission, resume PTA diabetic regiment on discharge      Depression  Continue Wellbutrin and Cymbalta  She appears to be compensated     Consultations This Hospital Stay   PHYSICAL THERAPY ADULT IP CONSULT  OCCUPATIONAL THERAPY ADULT IP CONSULT  CARDIOLOGY IP CONSULT  PHYSICAL THERAPY ADULT IP CONSULT  OCCUPATIONAL THERAPY ADULT IP CONSULT  CARE MANAGEMENT / SOCIAL WORK IP CONSULT    Code Status   Full Code    Time Spent on this Encounter   IJose DO, personally saw the patient today and spent greater than 30 minutes discharging this patient.       Jose Whittaker DO  [facility] " [facility]  [address]  [place] [address]  Phone: [phone]  ______________________________________________________________________    Physical Exam   Vital Signs: Temp: 97.7  F (36.5  C) Temp src: Oral BP: 124/69 Pulse: 82   Resp: 16 SpO2: 98 % O2 Device: Nasal cannula Oxygen Delivery: 2 LPM  Weight: 250 lbs 0 oz    Constitutional: awake, alert, cooperative, no apparent distress, and appears younger than stated age, obese  Eyes: Lids and lashes normal, pupils equal, round and reactive to light   ENT: Normocephalic, without obvious abnormality, atraumatic  Respiratory: No increased work of breathing, good air exchange, clear to auscultation bilaterally, no crackles or wheezing  Cardiovascular: Normal apical impulse, regular rate and rhythm, normal S1 and S2, no S3 or S4, and no murmur noted  GI: No scars, normal bowel sounds, soft, non-distended, non-tender, no masses palpated, no hepatosplenomegally  Skin: Left chest wall pacemaker site clean, dry, and intact   Musculoskeletal: There is no redness, warmth, or swelling of the joints.  Full range of motion noted.  Motor strength is 5 out of 5 all extremities bilaterally.  Tone is normal.  Neurologic: Awake, alert, oriented to name, place and time.     Neuropsychiatric: General: normal, calm, and normal eye contact       Primary Care Physician   Maryan Churchill    Discharge Orders      Primary Care - Care Coordination Referral      Home Care Referral      Reason for your hospital stay    - You were admitted to the hospital for acute heart failure exacerbation due to rapid heart rate     Follow-up and recommended labs and tests     - Please follow up with your primary care provider within 1-2 weeks of discharge for a repeat basic metabolic panel to ensure your kidney function is raul/improving  - Please follow up with cardiology as instructed for your new pacemaker  - Resume the Keflex you were taking prior to  admission to finish treatment for a urinary tract infection     Activity    Your activity upon discharge: activity as tolerated     Diet    Follow this diet upon discharge: Orders Placed This Encounter      Combination Diet Moderate Consistent Carb (60 g CHO per Meal) Diet; 2 gm NA Diet; Low Saturated Fat Na <2400mg Diet, No Caffeine Diet     CARDIAC DEVICE CHECK - IN CLINIC     Significant Results and Procedures   Most Recent 3 CBC's:  Recent Labs   Lab Test 07/14/24  0547 07/13/24 0622 07/12/24 0625   WBC 11.9* 10.8 9.8   HGB 12.4 11.8 11.8   MCV 86 86 84    330 328     Most Recent 3 BMP's:  Recent Labs   Lab Test 07/14/24  1140 07/14/24  0547 07/14/24  0016 07/13/24  0730 07/13/24 0622 07/12/24  0725 07/12/24  0625   NA  --  140  --   --  139  --  140   POTASSIUM  --  4.5  --   --  5.0  --  4.3   CHLORIDE  --  102  --   --  103  --  102   CO2  --  25  --   --  28  --  24   BUN  --  42.1*  --   --  47.1*  --  54.6*   CR  --  1.97*  --   --  2.08*  --  2.35*   ANIONGAP  --  13  --   --  8  --  14   TELLY  --  9.3  --   --  9.2  --  9.0   * 117* 131*   < > 119*   < > 124*    < > = values in this interval not displayed.     Most Recent 2 LFT's:  Recent Labs   Lab Test 12/27/23  1556 11/25/23  1215   AST 14 15   ALT 10 9   ALKPHOS 85 83   BILITOTAL 0.4 0.5   ,   Results for orders placed or performed during the hospital encounter of 07/11/24   XR Chest 2 Views    Narrative    EXAM: XR CHEST 2 VIEWS  LOCATION: [facility]  DATE: 7/11/2024    INDICATION: Shortness of breath  COMPARISON: 4/16/2024      Impression    IMPRESSION: No pleural fluid or pneumothorax. Reticular interstitial opacities throughout the lungs could be seen with edema. The cardiomediastinal silhouette is enlarged.   X-ray Chest 2 vws*    Narrative    EXAM: XR CHEST 2 VIEWS  LOCATION: [facility]  DATE: 7/13/2024    INDICATION: Status post pacer ICD  COMPARISON: 7/11/2024      Impression     IMPRESSION: Left subclavian approach pacing device. Cardiac silhouette within normal limits. Small bilateral pleural effusions. No pneumothorax.   EP Device    Narrative    Table formatting from the original result was not included.  PROCEDURES PERFORMED:   1. Implantation of a single-chamber pacemaker with the ventricular lead  2. Conscious sedation.   3. Cardiac fluoroscopy  4. AV refugio ablation    INDICATION: medical refractory  AF    HISTORY OF PRESENT ILLNESS: This is a 89 year old year-old patient with a   history of persistent AF with RVR refractory to medical therapy. She is   referred for a ppm and AVN ablation.    METHOD: Informed consent was obtained and Intravenous antibiotic was given   prior to the procedure. The patient was prepped and draped in the usual   manner.  The procedure was performed under conscious sedation. 1%   lidocaine was infiltrated into the left axillary vein area. This vein was   accessed using the modified Seldinger technique and ultrasound guidance   when applicable with a micropuncture needle.  An incision was then made   with a #15 blade. A peelable sheath was then advanced over the guidewire.    A long sheath and the lead were then advanced into the heart.  Mapping of   the left bundle region was performed.  The lead was fixed into mid RV   septum area under fluoroscopic guidance. Appropriate impedance sensing and   threshold were obtained. The long sheath was then cut away and the outer   sheath peeled away. The lead was then secured to the pectoralis muscle   fascia with O-Ethibond sutures.    A pocket was then fashioned and was irrigated with NS solution. The lead   was then attached to the device and placed in the pocket.  The pocket was   then closed with 2-0 and 4-0 Vicryl sutures. Steri-Strips and an OpSite   dressing were then placed over the incision.      The right groin was then prepped and draped the usual fashion. 1%   Lidocaine was infiltrated into the area. An  8-Taiwanese sheath was placed in   the right femoral vein via the modified Seldinger's technique. A large   curve 5 mm ablation catheter was then advanced into the heart. Ablation   was then performed using an EPT generator. Both catheter and sheath were   then removed. Hemostasis was achieved by placement of a suture in a figure   of 8 configuration held by a stopcock. The patient was then transferred   back to the room in stable condition.     DEVICE INFORMATION:  Implant Name Type Inv. Item Serial No.  Lot No. LRB No. Used   Action   LEAD PACEMAKER SELECTSECURE 69CM MD  3830-69 - [id number] Leads LEAD   PACEMAKER SELECTSECURE 69CM MD  3830-69 [id number] [employer]   [id number]  1 Implanted   PACEMAKER HANNAH XT SR MRI SYS - [id number] Pacemaker PACEMAKER HANNAH XT SR   MRI SYS [id number] [employer] [id number]  1 Implanted       STIMULATION THRESHOLDS:    RV -  Sense:5.25 mV, Threshold: 1.375 V @ 0.4-0.5 ms, Impedance: 779 ohms      ABLATION SUMMARY: Approximately 3 applications of radiofrequency ablation   were targeted at the AV node at 50 Lo and 60 degrees Celsius for 10-60   seconds after mapping of the AVN was done. Complete heart block was   achieved without a junctional escape rate of 40 beats per minute.    COMPLICATIONS: None.    Fluoroscopy (minutes):Dose 25.1mSv Fl Time:0min, DAP:6.3    CONCLUSION:  1. Uneventful ablation of AV node and implantation of a device pacemaker.    Davion Baron MD                EP Ablation    Narrative    Table formatting from the original result was not included.  PROCEDURES PERFORMED:   1. Implantation of a single-chamber pacemaker with the ventricular lead  2. Conscious sedation.   3. Cardiac fluoroscopy  4. AV refugio ablation    INDICATION: medical refractory  AF    HISTORY OF PRESENT ILLNESS: This is a 89 year old year-old patient with a   history of persistent AF with RVR refractory to medical therapy. She is   referred for a ppm and AVN  ablation.    METHOD: Informed consent was obtained and Intravenous antibiotic was given   prior to the procedure. The patient was prepped and draped in the usual   manner.  The procedure was performed under conscious sedation. 1%   lidocaine was infiltrated into the left axillary vein area. This vein was   accessed using the modified Seldinger technique and ultrasound guidance   when applicable with a micropuncture needle.  An incision was then made   with a #15 blade. A peelable sheath was then advanced over the guidewire.    A long sheath and the lead were then advanced into the heart.  Mapping of   the left bundle region was performed.  The lead was fixed into mid RV   septum area under fluoroscopic guidance. Appropriate impedance sensing and   threshold were obtained. The long sheath was then cut away and the outer   sheath peeled away. The lead was then secured to the pectoralis muscle   fascia with O-Ethibond sutures.    A pocket was then fashioned and was irrigated with NS solution. The lead   was then attached to the device and placed in the pocket.  The pocket was   then closed with 2-0 and 4-0 Vicryl sutures. Steri-Strips and an OpSite   dressing were then placed over the incision.      The right groin was then prepped and draped the usual fashion. 1%   Lidocaine was infiltrated into the area. An 8-Colombian sheath was placed in   the right femoral vein via the modified Seldinger's technique. A large   curve 5 mm ablation catheter was then advanced into the heart. Ablation   was then performed using an EPT generator. Both catheter and sheath were   then removed. Hemostasis was achieved by placement of a suture in a figure   of 8 configuration held by a stopcock. The patient was then transferred   back to the room in stable condition.     DEVICE INFORMATION:  Implant Name Type Inv. Item Serial No.  Lot No. LRB No. Used   Action   LEAD PACEMAKER SELECTSECURE 69CM MD  3830-69 - [id number] Leads LEAD    PACEMAKER SELECTSECURE 69CM MD  3830-69 [id number] [employer]   [id number]  1 Implanted   PACEMAKER HANNAH XT SR MRI SYS - [id number] Pacemaker PACEMAKER HANNAH XT SR   MRI SYS [id number] [employer] [id number]  1 Implanted       STIMULATION THRESHOLDS:    RV -  Sense:5.25 mV, Threshold: 1.375 V @ 0.4-0.5 ms, Impedance: 779 ohms      ABLATION SUMMARY: Approximately 3 applications of radiofrequency ablation   were targeted at the AV node at 50 Lo and 60 degrees Celsius for 10-60   seconds after mapping of the AVN was done. Complete heart block was   achieved without a junctional escape rate of 40 beats per minute.    COMPLICATIONS: None.    Fluoroscopy (minutes):Dose 25.1mSv Fl Time:0min, DAP:6.3    CONCLUSION:  1. Uneventful ablation of AV node and implantation of a device pacemaker.    Davion Baron MD                    Discharge Medications   Current Discharge Medication List        CONTINUE these medications which have NOT CHANGED    Details   acetaminophen (TYLENOL) 500 MG tablet Take 1,000 mg by mouth 2 times daily      albuterol (PROAIR HFA/PROVENTIL HFA/VENTOLIN HFA) 108 (90 Base) MCG/ACT inhaler Inhale 2 puffs into the lungs every 6 hours as needed for shortness of breath, wheezing or cough For shortness of breath    Comments: Pharmacy may dispense brand covered by insurance (Proair, or proventil or ventolin or generic albuterol inhaler)  Associated Diagnoses: SOB (shortness of breath)      apixaban ANTICOAGULANT (ELIQUIS) 2.5 MG tablet Take 1 tablet (2.5 mg) by mouth 2 times daily  Qty: 180 tablet, Refills: 3    Associated Diagnoses: Atrial fibrillation with RVR (H)      atorvastatin (LIPITOR) 20 MG tablet Take 1 tablet (20 mg) by mouth daily for cholesterol  Qty: 100 tablet, Refills: 3    Associated Diagnoses: Hyperlipidemia LDL goal <70      buPROPion (WELLBUTRIN SR) 100 MG 12 hr tablet Take 1 tablet (100 mg) by mouth daily for mood  Qty: 90 tablet, Refills: 3    Associated Diagnoses:  Moderate major depression (H)      calcium carbonate (TUMS) 500 MG chewable tablet Take 1 chew tab by mouth 2 times daily as needed for heartburn      cephALEXin (KEFLEX) 500 MG capsule Take 1 capsule (500 mg) by mouth 2 times daily  Qty: 14 capsule, Refills: 0    Associated Diagnoses: Urinary tract infection without hematuria, site unspecified      Cholecalciferol (VITAMIN D) 125 MCG (5000 UT) capsule Take 1 tablet by mouth daily      DULoxetine (CYMBALTA) 60 MG capsule Take 1 capsule (60 mg) by mouth daily  Qty: 90 capsule, Refills: 3    Associated Diagnoses: Moderate major depression (H)      fexofenadine (ALLEGRA) 60 MG tablet Take 60 mg by mouth daily      fluticasone-salmeterol (WIXELA INHUB) 100-50 MCG/ACT inhaler USE 1 INHALATION BY MOUTH EVERY  12 HOURS  Qty: 180 each, Refills: 3    Associated Diagnoses: Mild intermittent asthma without complication      glimepiride (AMARYL) 2 MG tablet Take 1 tablet (2 mg) by mouth 2 times daily (before meals)  Qty: 200 tablet, Refills: 2    Comments: Dose is increased  Associated Diagnoses: Type 2 diabetes mellitus with diabetic nephropathy, without long-term current use of insulin (H)      Menthol, Topical Analgesic, (ICY HOT MEDICATED SPRAY EX) Externally apply topically daily as needed (pain, in neck, back, hand)      metoprolol tartrate (LOPRESSOR) 25 MG tablet Take 3 tablets (75 mg) by mouth 2 times daily  Qty: 540 tablet, Refills: 1    Comments: Dose is changed  Associated Diagnoses: Atrial fibrillation with RVR (H)      miconazole (MICATIN) 2 % external cream Apply topically 2 times daily as needed for other (rash/irritation)      Multiple Vitamins-Minerals (HAIR SKIN AND NAILS FORMULA PO) Take 1 tablet by mouth daily      nitroGLYcerin (NITROSTAT) 0.4 MG sublingual tablet FOR CHEST PAIN PLACE 1 TABLET  UNDER TONGUE EVERY 5 MINUTES FOR 3 DOSES. IF SYMPTOMS PERSIST 5  MINUTES AFTER 1ST DOSE CALL 911  Qty: 25 tablet, Refills: 0    Associated Diagnoses: Chest pain,  unspecified type      pantoprazole (PROTONIX) 20 MG EC tablet Take 1 tablet (20 mg) by mouth daily  Qty: 90 tablet, Refills: 3    Associated Diagnoses: Gastroesophageal reflux disease without esophagitis      torsemide (DEMADEX) 20 MG tablet Take 2 tablets (40 mg) by mouth daily  Qty: 270 tablet, Refills: 1    Comments: Please inform the patient that due to insurance reason the this  was changed from 40 mg to 20 mg 2 tablets  Associated Diagnoses: Heart failure with preserved ejection fraction, NYHA class I (H)      triamcinolone (KENALOG) 0.1 % external cream Apply topically 2 times daily as needed for irritation      blood glucose (ACCU-CHEK GUIDE) test strip Use to test blood sugar once daily or as directed.  Qty: 100 strip, Refills: 3    Associated Diagnoses: Type 2 diabetes mellitus with diabetic nephropathy, without long-term current use of insulin (H)      blood glucose (ACCU-CHEK SOFTCLIX) lancing device Lancing device to be used with lancets.  Qty: 1 each, Refills: 0    Associated Diagnoses: Type 2 diabetes mellitus with diabetic nephropathy, without long-term current use of insulin (H)      blood glucose monitoring (SOFTCLIX) lancets Use to test blood sugar once daily.  Qty: 100 each, Refills: 3    Associated Diagnoses: Type 2 diabetes mellitus with diabetic nephropathy, without long-term current use of insulin (H)           Allergies   Allergies   Allergen Reactions    Aspirin Hives     Reaction occurred during childhood.     Lidocaine Itching    Lisinopril Cough    Losartan      Hyperkalemia      Metformin      Elevated lactic acid    Minocycline      Yellow Dye Allergy. Minocycline has Yellow Dye #10.    Mounjaro [Tirzepatide] Diarrhea and GI Disturbance    Salicylates Hives    Yellow Dye Hives     Rxn to yellow tablet. Eyes swelled shut.     Yellow Dyes (Non-Tartrazine) Hives

## 2024-07-14 NOTE — PLAN OF CARE
Goal Outcome Evaluation:    VSS. Monitor shows V.Paced rhythm. Pt. Denies pain. Left chest pacer dressing CDI. Plan for discharge today.

## 2024-07-14 NOTE — DISCHARGE INSTRUCTIONS
Pacemaker Implant Discharge Instructions     After you go home:    Have an adult stay with you until tomorrow.  You may resume your normal diet.       For 24 hours - due to the sedation you received:  Relax and take it easy.  Do NOT make any important or legal decisions.  Do NOT drive or operate machines at home or at work.  Do NOT drink alcohol.    Care of Chest Incision:    Keep the bandage on at least 3 days. You may remove the dressing on Tuesday, July 16. Change it only if it gets loose or soaked. If you need to change it, use 4x4-inch gauze and a large clear bandage.   If there is a pressure dressing (gauze & tape) - 24 hours after your procedure you may remove ONLY the top dressing. Leave the bottom dressing on.  Leave the strips of tape on. They will fall off on their own, or we will remove them at your first check-up.  Check your wound daily for signs of infection, such as increased redness, severe swelling or draining. Fever may also be a sign of infection. Call us if you see any of these signs.  If there are no signs of infection, you may shower after the bandage comes off in 3 days. If you take a tub bath, keep the wound dry.  No soaking the incision (swimming pool, bathtub, hot tub) for 2 weeks.  You may have mild to medium pain for 3 to 5 days. Take Acetaminophen (Tylenol) or Ibuprofen (Advil) for the pain. If the pain persists or is severe, call us.    Activity:    For at least 2 weeks: Do not raise your elbow above your shoulder. You can begin to use your arm as it feels comfortable to you.  Do not use arm on implant side to lift more than 10 pounds for 2 weeks.  In 6 to 8 weeks: You may begin to golf, play tennis, swim and do similar activities.  No driving for one day & limit to necessary driving for one week.    Bleeding:    If you start bleeding from the incision site, sit down and press firmly on the site for 10 minutes.   Once bleeding stops, call [facility] as soon as you can.       Call  911 right away if you have heavy bleeding or bleeding that does not stop.      Medicines:    Take your medications, including blood thinners, unless your provider tells you not to.  If you have stopped any medicines, check with your provider about when to restart them.    Follow Up Appointments:    Follow up with Device Clinic at [facility] of patient preference in 7-10 days.    Call the clinic if:    You have a large or growing hard lump around the site.  The site is red, swollen, hot or tender.  Blood or fluid is draining from the site.  You have chills or a fever greater than 101 F (38 C).  You feel dizzy or light-headed.  Questions or concerns    Telling others about your device:    Before you leave the hospital, you will receive a temporary ID card. A permanent card will be mailed to you about 6 to 8 weeks later. Always carry the ID card with you. It has important details about your device.  You may also get a Medical Alert bracelet or tag that says you have a pacemaker.  Go to [email or web address].   Always tell doctors, dentists and other care providers that you have a device implanted in you.  Let us know before you plan any surgeries. Your care team must take special steps to keep you safe during certain procedures. These steps will depend on the type of device you have. Your provider will need to see your ID card. They may need to call us for instructions.    Device Safety:    Please refer to device  s booklet for further information.        [facility] [place]    [phone]  Device Clinic    Or you may contact your provider via My Chart           AV Node Ablation Discharge Instructions - Femoral     After you go home:    Have an adult stay with you until tomorrow.  You may resume your normal diet.       For 24 hours - due to the sedation you received:  Relax and take it easy.  Do NOT make any important or legal decisions.  Do NOT drive or operate machines at home  or at work.  Do NOT drink alcohol.    Care of Groin Puncture Site:    For the first 24 hrs - check the puncture site every 1-2 hours while awake.  For 2 days, when you cough, sneeze, laugh or move your bowels, hold your hand over the puncture site and press firmly.  Remove the bandaid after 24 hours. If there is minor oozing, apply another bandaid and remove it after 12 hours.  It is normal to have a small bruise or pea size lump at the site.  You may shower tomorrow.  Do NOT take a bath, or use a hot tub or pool for at least 3 days. Do NOT scrub the site. Do not use lotion or powder near the puncture site.    Activity:            For 2 days:  No stooping or squatting  Do NOT do any heavy activity such as exercise, lifting, or straining.   No housework, yard work or any activity that make you sweat  Do NOT lift more than 10 pounds    Bleeding:    If you start bleeding from the site in your groin, lie down flat and press firmly on the site for 10 minutes.   Once bleeding stops, lay flat for 2 hours.  Call [facility] as soon as you can.       Call 911 right away if you have heavy bleeding or bleeding that does not stop.      Medicines:    Take your medications, including blood thinners, unless your provider tells you not to.  If you have stopped any medicines, check with your provider about when to restart them.  If you have pain or shortness of breath, you may take Advil (ibuprofen) or Tylenol (acetaminophen).    Follow Up Appointments:    Follow up with [facility] Nurse Practitioner at [facility] of patient preference in 7-10 days.    Call the clinic if:    You have increased pain or a large or growing hard lump around the site.  The site is red, swollen, hot or tender.  Blood or fluid is draining from the site.  You have chills or a fever greater than 101 F (38 C).  Your leg feels numb, cool or changes color.  Increased pain in the chest and/or groin.  Increased shortness of breath  Chest pain not  relieved by Tylenol or Advil  New pain in the back or belly that you cannot control with Tylenol.  Recurrent irregular or fast heart rate lasting over 2 hours.  Any questions or concerns.    Heart rhythms:    You may have some irregular heartbeats. These feel very strong. They may make you feel that the fast heart rhythm is going to start again.  Give it time. The irregular beats should occur less often.       [facility]:    [phone] ( 8am-5pm M-F)  Kianna Patricia or Josselyn    [phone] option 2 (7 days a week)  on call Cardiologist  .  Or you may contact your provider via My Chart

## 2024-07-14 NOTE — PLAN OF CARE
Goal Outcome Evaluation:    Plan Of Care Reviewed With: Patient     Pt A/Ox4 calm cooperative and pleasant. Vitals stable and cardiac rhythm remains 100% V-Paced. Some tenderness reported at the PPM site, tylenol administered along with ice packs, pt reports relief. BGM's this evening were 158 and 153, good appetite. Pt up in chair with assist x1 and walker to restroom. Likely discharge home tomorrow.

## 2024-07-14 NOTE — PLAN OF CARE
Occupational Therapy Discharge Summary    Reason for therapy discharge:    Discharged to home with home therapy.    Progress towards therapy goal(s). See goals on Care Plan in [facility] electronic health record for goal details.  Goals not met.  Barriers to achieving goals:   discharge from facility.    Therapy recommendation(s):    Continued therapy is recommended.  Rationale/Recommendations:  TCU to address safety and IND in I/ADLs. Pt declines TCU. If home, then assist in all I/ADLs and resume home therapies..

## 2024-07-15 ENCOUNTER — PATIENT OUTREACH (OUTPATIENT)
Dept: CARE COORDINATION | Facility: CLINIC | Age: 89
End: 2024-07-15
Payer: COMMERCIAL

## 2024-07-15 ENCOUNTER — TELEPHONE (OUTPATIENT)
Dept: CARDIOLOGY | Facility: CLINIC | Age: 89
End: 2024-07-15
Payer: COMMERCIAL

## 2024-07-15 DIAGNOSIS — Z95.0 CARDIAC PACEMAKER IN SITU: Primary | ICD-10-CM

## 2024-07-15 NOTE — TELEPHONE ENCOUNTER
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 (8/9/24)  - Remove outer dressing 3 days after implant. May shower after outer dressing removed.   - Leave steri-strips in place.  If appropriate, these will be removed at 1 week device check  - Limit driving for: 1 week (PPM)  - Watch for redness, drainage, warmth, or fever. Call device clinic if any signs of infection.   - No soaking in bath tub, swimming pool or hot tub until you are cleared at your 6-8 week check    1 week device check scheduled: 7/19/24    Pt states understanding of all instructions and will call with any questions.  Device clinic phone number provided ([phone]).

## 2024-07-15 NOTE — PROGRESS NOTES
Clinic Care Coordination Contact  Care Team Conversations    Per pt chart, pt was admitted to [facility] from 07/11/24 to 07/14/24. Lead CC out-of-office today. Per chart review, pt is enrolled/established with CC and CHW planning outreach tomorrow (or next few days). No immediate concerns or needs identified at this time. Per chart review, pt was sent home in stable condition and has a follow-up apt with her Cardiologist on Fri 07/19/24. Will defer to CHW and Lead CC outreach. This writer available if needs arise.     Kimmie Arevalo, [employer]  Primary Care Social Work Care Coordinator  [facility], & [place]   PH: [phone]

## 2024-07-15 NOTE — Clinical Note
Dilan Bowers,   I'm following up on some of Maria L's ADTs and see that this pt was in the hospital this last week and you have a planned outreach tomorrow. I don't see any immediate needs or concerns so am not going to plan to follow-up at this time. LMK if you have any further questions or concerns.   Thanks,  Kimmie Arevalo, [facility] Primary Care Social Work Care Coordinator [facility], & [place]  PH: [phone]

## 2024-07-16 ENCOUNTER — PATIENT OUTREACH (OUTPATIENT)
Dept: CARE COORDINATION | Facility: CLINIC | Age: 89
End: 2024-07-16
Payer: COMMERCIAL

## 2024-07-16 NOTE — PROGRESS NOTES
Clinic Care Coordination Contact  Community Health Worker Follow Up    Care Gaps:     Health Maintenance Due   Topic Date Due    SPIROMETRY  Never done    COPD ACTION PLAN  Never done    URINE DRUG SCREEN  06/27/2023    DIABETIC FOOT EXAM  10/24/2023    COVID-19 Vaccine (8 - 2023-24 season) 02/26/2024    EYE EXAM  06/29/2024    ZOSTER IMMUNIZATION (2 of 2) 08/11/2023     Did Not Address      Care Plan:   Care Plan: Community Resources         Problem: Insufficient In-home support         Note:      Goal Statement: Kristyn will continue working with Care Coordination to ensure her needs are met for overall health and wellbeing.  Date Goal Set: 2/28/24  Barriers: Fixed income  Strengths: Strong support system  Date to Achieve By: 2/28/25  Patient expressed understanding of goal: yes  Action steps to achieve this goal:  1. I will continue to follow up with medical team as needed  2. I will sign up for [employer] (4/24 Signed up will use)  3. I will talk to a  to get more insights into [facility]  4. I will consider signing up for [employer] assessment 4/24 Will be calling   5. I will look into volunteer opportunities, possibly stamping classes at [facility]   6. I will outreach to Care Coordination  for further questions or concerns                Discussed:  Patient stated she is feeling better.  Patient stated she was in the hospital.  Patient stated she has all the medications needed.  Patient stated she is using her walker and has not had any falls.  Patient stated she prepares her meals.  Patient stated she drives and has a car.  Transportation is not needed.      Intervention and Education during outreach:     CHW confirmed patient's upcoming appointment on 7/19/24 with [facility].    CHW asked patient if there are any resource needs at this time, patient declined.    CHW encouraged patient to contact CC if there are any other changes or resources needed.  Patient acknowledged  understanding.      CHW Plan: will outreach in one month.    Elissa Allison, CHW  Clinic Care Coordination  [facility] Clin[facility]: [facility] [place], and [facility]  Phone: [phone]

## 2024-07-17 ENCOUNTER — TELEPHONE (OUTPATIENT)
Dept: FAMILY MEDICINE | Facility: CLINIC | Age: 89
End: 2024-07-17
Payer: COMMERCIAL

## 2024-07-17 DIAGNOSIS — R07.9 CHEST PAIN, UNSPECIFIED TYPE: ICD-10-CM

## 2024-07-17 RX ORDER — NITROGLYCERIN 0.4 MG/1
TABLET SUBLINGUAL
Qty: 25 TABLET | Refills: 0 | Status: SHIPPED | OUTPATIENT
Start: 2024-07-17 | End: 2024-07-25

## 2024-07-17 NOTE — TELEPHONE ENCOUNTER
10/18/2023    11:57 AM   Home Care Information   Date of Home Care episode start 10/18/2023   Current following provider Dr. Churchill     Requesting orders from: Maryan Churchill  Provider is following patient: Yes  Is this a 60-day recertification request?  No    Orders Requested    Physical Therapy  Request for initial evaluation and treatment (one time)     Physical Therapy  Request for initial evaluation and treatment (one time) One time per week for five weeks.   Nursing evaluation and they will call back .   Home Health aid one time per week for four weeks.           Verbal orders given.  Home Care will send orders for provider to sign.  Confirmed ok to leave a detailed message with call back.  Contact information confirmed and updated as needed.    Xiomy Tellez RN

## 2024-07-19 ENCOUNTER — TELEPHONE (OUTPATIENT)
Dept: FAMILY MEDICINE | Facility: CLINIC | Age: 89
End: 2024-07-19

## 2024-07-19 ENCOUNTER — ANCILLARY PROCEDURE (OUTPATIENT)
Dept: CARDIOLOGY | Facility: CLINIC | Age: 89
End: 2024-07-19
Attending: INTERNAL MEDICINE
Payer: COMMERCIAL

## 2024-07-19 DIAGNOSIS — Z95.0 CARDIAC PACEMAKER IN SITU: ICD-10-CM

## 2024-07-19 DIAGNOSIS — Z98.890 HX OF ATRIOVENTRICULAR NODE ABLATION: ICD-10-CM

## 2024-07-19 DIAGNOSIS — I48.91 ATRIAL FIBRILLATION WITH RVR (H): Primary | ICD-10-CM

## 2024-07-19 PROCEDURE — 93279 PRGRMG DEV EVAL PM/LDLS PM: CPT | Performed by: INTERNAL MEDICINE

## 2024-07-19 NOTE — TELEPHONE ENCOUNTER
Home Care is calling regarding an established patient with [facility].        10/18/2023    11:57 AM   Home Care Information   Date of Home Care episode start 10/18/2023   Current following provider Dr. Churchill     Requesting orders from: Maryan Churchill  Provider is following patient: Yes  Is this a 60-day recertification request?  No    Orders Requested    Skilled Nursing  Request for initial certification (first set of orders)   Frequency:  1x/wk for 3 wks, every other week for 2wk, and 3 PRN    Information was gathered and will be sent to provider for review.  RN will contact Home Care with information after provider review.  Confirmed ok to leave a detailed message with call back.  Contact information confirmed and updated as needed.    Jeniffer Hidalgo RN

## 2024-07-21 LAB
MDC_IDC_LEAD_CONNECTION_STATUS: NORMAL
MDC_IDC_LEAD_IMPLANT_DT: NORMAL
MDC_IDC_LEAD_LOCATION: NORMAL
MDC_IDC_LEAD_LOCATION_DETAIL_1: NORMAL
MDC_IDC_LEAD_MFG: NORMAL
MDC_IDC_LEAD_MODEL: NORMAL
MDC_IDC_LEAD_POLARITY_TYPE: NORMAL
MDC_IDC_LEAD_SERIAL: NORMAL
MDC_IDC_MSMT_BATTERY_DTM: NORMAL
MDC_IDC_MSMT_BATTERY_REMAINING_LONGEVITY: 145 MO
MDC_IDC_MSMT_BATTERY_RRT_TRIGGER: 2.62
MDC_IDC_MSMT_BATTERY_STATUS: NORMAL
MDC_IDC_MSMT_BATTERY_VOLTAGE: 3.21 V
MDC_IDC_MSMT_LEADCHNL_RV_IMPEDANCE_VALUE: 475 OHM
MDC_IDC_MSMT_LEADCHNL_RV_IMPEDANCE_VALUE: 741 OHM
MDC_IDC_MSMT_LEADCHNL_RV_PACING_THRESHOLD_AMPLITUDE: 0.5 V
MDC_IDC_MSMT_LEADCHNL_RV_PACING_THRESHOLD_PULSEWIDTH: 0.4 MS
MDC_IDC_MSMT_LEADCHNL_RV_SENSING_INTR_AMPL: 3.88 MV
MDC_IDC_PG_IMPLANT_DTM: NORMAL
MDC_IDC_PG_MFG: NORMAL
MDC_IDC_PG_MODEL: NORMAL
MDC_IDC_PG_SERIAL: NORMAL
MDC_IDC_PG_TYPE: NORMAL
MDC_IDC_SESS_CLINIC_NAME: NORMAL
MDC_IDC_SESS_DTM: NORMAL
MDC_IDC_SESS_TYPE: NORMAL
MDC_IDC_SET_BRADY_HYSTRATE: NORMAL
MDC_IDC_SET_BRADY_LOWRATE: 70 {BEATS}/MIN
MDC_IDC_SET_BRADY_MAX_SENSOR_RATE: 120 {BEATS}/MIN
MDC_IDC_SET_BRADY_MODE: NORMAL
MDC_IDC_SET_LEADCHNL_RV_PACING_AMPLITUDE: 2.75 V
MDC_IDC_SET_LEADCHNL_RV_PACING_ANODE_ELECTRODE_1: NORMAL
MDC_IDC_SET_LEADCHNL_RV_PACING_ANODE_LOCATION_1: NORMAL
MDC_IDC_SET_LEADCHNL_RV_PACING_CAPTURE_MODE: NORMAL
MDC_IDC_SET_LEADCHNL_RV_PACING_CATHODE_ELECTRODE_1: NORMAL
MDC_IDC_SET_LEADCHNL_RV_PACING_CATHODE_LOCATION_1: NORMAL
MDC_IDC_SET_LEADCHNL_RV_PACING_POLARITY: NORMAL
MDC_IDC_SET_LEADCHNL_RV_PACING_PULSEWIDTH: 0.4 MS
MDC_IDC_SET_LEADCHNL_RV_SENSING_ANODE_ELECTRODE_1: NORMAL
MDC_IDC_SET_LEADCHNL_RV_SENSING_ANODE_LOCATION_1: NORMAL
MDC_IDC_SET_LEADCHNL_RV_SENSING_CATHODE_ELECTRODE_1: NORMAL
MDC_IDC_SET_LEADCHNL_RV_SENSING_CATHODE_LOCATION_1: NORMAL
MDC_IDC_SET_LEADCHNL_RV_SENSING_POLARITY: NORMAL
MDC_IDC_SET_LEADCHNL_RV_SENSING_SENSITIVITY: 0.9 MV
MDC_IDC_SET_ZONE_DETECTION_INTERVAL: 400 MS
MDC_IDC_SET_ZONE_STATUS: NORMAL
MDC_IDC_SET_ZONE_TYPE: NORMAL
MDC_IDC_SET_ZONE_VENDOR_TYPE: NORMAL
MDC_IDC_STAT_BRADY_DTM_END: NORMAL
MDC_IDC_STAT_BRADY_DTM_START: NORMAL
MDC_IDC_STAT_BRADY_RV_PERCENT_PACED: 99.63 %
MDC_IDC_STAT_EPISODE_RECENT_COUNT: 0
MDC_IDC_STAT_EPISODE_RECENT_COUNT_DTM_END: NORMAL
MDC_IDC_STAT_EPISODE_RECENT_COUNT_DTM_START: NORMAL
MDC_IDC_STAT_EPISODE_TOTAL_COUNT: 0
MDC_IDC_STAT_EPISODE_TOTAL_COUNT_DTM_END: NORMAL
MDC_IDC_STAT_EPISODE_TOTAL_COUNT_DTM_START: NORMAL
MDC_IDC_STAT_EPISODE_TYPE: NORMAL

## 2024-07-23 ENCOUNTER — OFFICE VISIT (OUTPATIENT)
Dept: FAMILY MEDICINE | Facility: CLINIC | Age: 89
End: 2024-07-23
Payer: COMMERCIAL

## 2024-07-23 VITALS
WEIGHT: 250 LBS | HEIGHT: 67 IN | SYSTOLIC BLOOD PRESSURE: 128 MMHG | HEART RATE: 76 BPM | OXYGEN SATURATION: 98 % | TEMPERATURE: 97.5 F | BODY MASS INDEX: 39.24 KG/M2 | DIASTOLIC BLOOD PRESSURE: 73 MMHG

## 2024-07-23 DIAGNOSIS — I48.91 ATRIAL FIBRILLATION, UNSPECIFIED TYPE (H): ICD-10-CM

## 2024-07-23 DIAGNOSIS — I50.30 HEART FAILURE WITH PRESERVED EJECTION FRACTION, NYHA CLASS I (H): ICD-10-CM

## 2024-07-23 DIAGNOSIS — N39.0 URINARY TRACT INFECTION WITHOUT HEMATURIA, SITE UNSPECIFIED: ICD-10-CM

## 2024-07-23 DIAGNOSIS — E11.21 TYPE 2 DIABETES MELLITUS WITH DIABETIC NEPHROPATHY, WITHOUT LONG-TERM CURRENT USE OF INSULIN (H): Primary | ICD-10-CM

## 2024-07-23 DIAGNOSIS — E66.01 MORBID OBESITY (H): ICD-10-CM

## 2024-07-23 LAB
ANION GAP SERPL CALCULATED.3IONS-SCNC: 12 MMOL/L (ref 7–15)
BUN SERPL-MCNC: 31 MG/DL (ref 8–23)
CALCIUM SERPL-MCNC: 9.9 MG/DL (ref 8.8–10.4)
CHLORIDE SERPL-SCNC: 101 MMOL/L (ref 98–107)
CREAT SERPL-MCNC: 1.95 MG/DL (ref 0.51–0.95)
EGFRCR SERPLBLD CKD-EPI 2021: 24 ML/MIN/1.73M2
GLUCOSE SERPL-MCNC: 73 MG/DL (ref 70–99)
HCO3 SERPL-SCNC: 27 MMOL/L (ref 22–29)
POTASSIUM SERPL-SCNC: 5.2 MMOL/L (ref 3.4–5.3)
SODIUM SERPL-SCNC: 140 MMOL/L (ref 135–145)

## 2024-07-23 PROCEDURE — 99495 TRANSJ CARE MGMT MOD F2F 14D: CPT | Performed by: PHYSICIAN ASSISTANT

## 2024-07-23 PROCEDURE — 80048 BASIC METABOLIC PNL TOTAL CA: CPT | Performed by: PHYSICIAN ASSISTANT

## 2024-07-23 PROCEDURE — 36415 COLL VENOUS BLD VENIPUNCTURE: CPT | Performed by: PHYSICIAN ASSISTANT

## 2024-07-23 ASSESSMENT — PAIN SCALES - GENERAL: PAINLEVEL: NO PAIN (0)

## 2024-07-23 ASSESSMENT — PATIENT HEALTH QUESTIONNAIRE - PHQ9: SUM OF ALL RESPONSES TO PHQ QUESTIONS 1-9: 5

## 2024-07-23 NOTE — PROGRESS NOTES
Guanako Simons is a 89 year old, presenting for the following health issues:  Hospital F/U    Hospital Course  Moira Andre is a 89 year old female who has history of PAF on anticoagulation, Chronic diastolic heart failure, NYHA class II, ELIGIO wears CPAP, CAD with PCI to OM/Dx in 2007, mild-mod Aortic stenosis, moderate mitral stenosis, Hyperlipidemia, Stage III CKD, Obesity, DM, RA, OA, Depression who presents with weakness.  She was diagnosed with a UTI and has been suppose to take keflex.  Her Troponin were elevated to 45, WBC is 10.1, hgb 13, EKG shows atrial fibrillation with resting HR of 111 in clinic. She was diagnosed with UTI at her clinic.  CXR revealed a full reticular interstitial pattern throughout the lungs concerning for pulmonary edema and CHF. Sent to [facility] and admitted for heart failure exacerbation in setting of uncontrolled atrial fibrillation and possibly excessive salt intake. S/p single chamber ppm and AVN ablation 7/12/24. Discharging home with home services on 7/14/24. Hospital course by problem below.      Acute decompensated diastolic heart failure, improving  Atrial fibrillation with elevated HR  S/p single chamber ppm and AVN ablation 7/12/24  Type 2 MI in setting of uncontrolled atrial fibrillation   Admit patient to inpatient cardiac bed with telemetry.  She has been admitted 3 times since 10/2023, most recently on 4/14/24 where she was thought to have COPD exacerbation that caused her atrial fibrillation with RVR.  Her dry weight at discharge in 12/2023 was 256 lbs.  HF exacerbation suspected in setting of poor atrial fibrillation rate control. She also ate a very salty meal prior to presentation which could have contributed. Labs on admission significant for BNP of 14,392, Creatinine of 2.5, Troponin trend 45, 39. Patient underwent single chamber ppm and AVN ablation on 7/12/24 without complication. Her creatinine improved after pacemaker placement and continued to  "improve with resumption of her PTA diuretic. Creatinine is 1.9 at time of discharge. Most recent baseline around 1.5.   Follow up with cardiology as instructed  Wound care of pacemaker site per cardiology  Continue metoprolol 75 mg BID  Continue eliquis  Continue PTA torsemide, please repeat BMP within 1 week of discharge to ensure stable/improving creatinine, this can be done by home nurse   Low salt carb consistent diet on discharge      ASCVD with PCI OM/Dx in 2007  Valvular disease mild-mod Aortic stenosis, moderate mitral stenosis  Hyperlipidemia  She denies any chest pain, troponin mildly elevated and  Patient denies any chest pain, EKG shows atrial fibrillation.  Troponin is 45 and 39.  Patient is allergic to asa  Continue Plavix  Continue Lipitor  NTG as needed     Urinary tract infection  She was noted to have foul smelling urine per home care.  She had an abnormal UA and suppose to be started on keflex   Ceftriaxone while admitted  Resume prior to admission keflex on discharge     TIFFANI in CKD stage III  She admits to not taking any of her medications for a few days and not eating much until today when she ate at a chinese buffet. Creatinine trend 2.50, 2.35, 2.08, 1.92.  - Repeat BMP within one week of discharge while on PTA medication regiment      COPD / Asthma  ELIGIO  Obesity  She states she is using oxygen more at night time.  She is not needing supplemental oxygen hear and respiring comfortably.  She does not appear to have COPD exacerbation  Continue albuterol MDI and Advair  She has not been compliant with her CPAP machine and waited for many months and just recently got her cpap.  She wore it one day and it apparently \"fell apart\" and she has been unable to use this.  She does have home care.  CPAP at bedtime     DM Type II  Mod cho diet  Hold amaryl  Accu checks with med intensity sliding scale during admission, resume PTA diabetic regiment on discharge      Depression  Continue Wellbutrin and " Cymbalta  She appears to be compensated      HPI       Hospital Follow-up Visit:    Hospital/Nursing Home/IP Rehab Facility: [facility]  Date of Admission: 07/11/24  Date of Discharge: 07/14/24  Reason(s) for Admission:   Acute decompensated diastolic heart failure, resolved   S/p single chamber ppm and AVN ablation 7/12/24  ASCVD with PCI OM/Dx in 2007  Valvular disease mild-mod Aortic stenosis, moderate mitral stenosis  Hyperlipidemia  Urinary tract infection  TIFFANI in CKD stage III  COPD / Asthma  ELIGIO  Obesity  DM Type II  Depression  Was the patient in the ICU or did the patient experience delirium during hospitalization?  No  Do you have any other stressors you would like to discuss with your provider? No    Problems taking medications regularly:  None  Medication changes since discharge: None  Problems adhering to non-medication therapy:      Summary of hospitalization:  [facility] discharge summary reviewed  Diagnostic Tests/Treatments reviewed.  Follow up needed: cardiology for pacemaker check  Other Healthcare Providers Involved in Patient s Care:         Specialist appointment - cards  Update since discharge: improved.       Pt denies chest pain, sob, edema or palpitations  Denies urinary urgency, freq or dysuria.    Plan of care communicated with patient     Past Medical History:   Diagnosis Date    Aortic valve sclerosis     heart murmur, no AS    Arrhythmia     PAT, PVC    Aspirin allergy     Plavix use long term    Asthma     CKD (chronic kidney disease) stage 3, GFR 30-59 ml/min (H)     x 2007 atleast    Congestive heart failure, unspecified     Depression     Diabetes mellitus (H) 2010    Diastolic dysfunction, left ventricle 2013    grade 2, nl ef    HTN (hypertension)     Lactic acidosis 08/2018    due to dehydration and metformin    Migraine headache     Mitral stenosis     mild, 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    Myocardial infarction (H) 9/2007, cath 2013 ml    BMS:  stent to OM, diag, nl EF, echo /C angia 2013 , f/u cath no lesion >40%    Nephrolithiasis     right side    OA (osteoarthritis) of knee     Obesity     Rheumatoid arthritis flare (H)     prednisone    Sleep apnea     restarted using cpap     TIA (transient ischaemic attack)     Ventral hernia, unspecified, without mention of obstruction or gangrene      Past Surgical History:   Procedure Laterality Date    APPENDECTOMY      BIOPSY BREAST      x2 -needle & lumpectomy-benign    CHOLECYSTECTOMY      CORONARY ANGIOGRAPHY ADULT ORDER  2007    Bare metal stent to OM1, Diagonal patent     CORONARY ANGIOGRAPHY ADULT ORDER  2007    [place] stent to Diagonal    EP ABLATION AV NODE N/A 2024    Procedure: Ablation Atrioventricular Node;  Surgeon: Davion Baron MD;  Location:  HEART CARDIAC CATH LAB    EP PACEMAKER DEVICE & LEAD IMPLANT- RIGHT VENTRICULAR N/A 2024    Procedure: Pacemaker Device & Lead Implant- Right ventricular;  Surgeon: Davion Baron MD;  Location:  HEART CARDIAC CATH LAB    HC LEFT HEART CATHETERIZATION  2013    Moderate CAD    HYSTERECTOMY TOTAL ABDOMINAL      ORTHOPEDIC SURGERY      knee replacement on right side (), Left side ()    RELEASE CARPAL TUNNEL      right and left    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     Social History     Tobacco Use    Smoking status: Former     Current packs/day: 0.00     Average packs/day: 0.3 packs/day for 1.3 years (0.3 ttl pk-yrs)     Types: Cigarettes     Start date:      Quit date: 1973     Years since quittin.2    Smokeless tobacco: Never   Substance Use Topics    Alcohol use: Yes     Alcohol/week: 0.0 - 1.0 standard drinks of alcohol     Comment: rarely     Current Outpatient Medications   Medication Sig Dispense Refill    acetaminophen (TYLENOL) 500 MG tablet Take 1,000 mg by mouth 2 times daily      albuterol (PROAIR HFA/PROVENTIL HFA/VENTOLIN HFA) 108 (90 Base) MCG/ACT inhaler Inhale 2 puffs  into the lungs every 6 hours as needed for shortness of breath, wheezing or cough For shortness of breath      apixaban ANTICOAGULANT (ELIQUIS) 2.5 MG tablet Take 1 tablet (2.5 mg) by mouth 2 times daily 180 tablet 3    atorvastatin (LIPITOR) 20 MG tablet Take 1 tablet (20 mg) by mouth daily for cholesterol 100 tablet 3    blood glucose (ACCU-CHEK GUIDE) test strip Use to test blood sugar once daily or as directed. 100 strip 3    blood glucose (ACCU-CHEK SOFTCLIX) lancing device Lancing device to be used with lancets. 1 each 0    blood glucose monitoring (SOFTCLIX) lancets Use to test blood sugar once daily. 100 each 3    buPROPion (WELLBUTRIN SR) 100 MG 12 hr tablet Take 1 tablet (100 mg) by mouth daily for mood 90 tablet 3    calcium carbonate (TUMS) 500 MG chewable tablet Take 1 chew tab by mouth 2 times daily as needed for heartburn      cephALEXin (KEFLEX) 500 MG capsule Take 1 capsule (500 mg) by mouth 2 times daily 14 capsule 0    Cholecalciferol (VITAMIN D) 125 MCG (5000 UT) capsule Take 1 tablet by mouth daily      DULoxetine (CYMBALTA) 60 MG capsule Take 1 capsule (60 mg) by mouth daily 90 capsule 3    fexofenadine (ALLEGRA) 60 MG tablet Take 60 mg by mouth daily      fluticasone-salmeterol (WIXELA INHUB) 100-50 MCG/ACT inhaler USE 1 INHALATION BY MOUTH EVERY  12 HOURS (Patient taking differently: Inhale 1 puff into the lungs daily USE 1 INHALATION BY MOUTH EVERY  12 HOURS) 180 each 3    glimepiride (AMARYL) 2 MG tablet Take 1 tablet (2 mg) by mouth 2 times daily (before meals) 200 tablet 2    Menthol, Topical Analgesic, (ICY HOT MEDICATED SPRAY EX) Externally apply topically daily as needed (pain, in neck, back, hand)      metoprolol tartrate (LOPRESSOR) 25 MG tablet Take 3 tablets (75 mg) by mouth 2 times daily 540 tablet 1    miconazole (MICATIN) 2 % external cream Apply topically 2 times daily as needed for other (rash/irritation)      Multiple Vitamins-Minerals (HAIR SKIN AND NAILS FORMULA PO) Take 1  "tablet by mouth daily      nitroGLYcerin (NITROSTAT) 0.4 MG sublingual tablet FOR CHEST PAIN PLACE 1 TABLET  UNDER TONGUE EVERY 5 MINUTES FOR 3 DOSES. IF SYMPTOMS PERSIST 5  MINUTES AFTER 1ST DOSE CALL 911 25 tablet 0    pantoprazole (PROTONIX) 20 MG EC tablet Take 1 tablet (20 mg) by mouth daily 90 tablet 3    torsemide (DEMADEX) 20 MG tablet Take 2 tablets (40 mg) by mouth daily (Patient taking differently: Take 20 mg by mouth 2 times daily) 270 tablet 1    triamcinolone (KENALOG) 0.1 % external cream Apply topically 2 times daily as needed for irritation       Allergies   Allergen Reactions    Aspirin Hives     Reaction occurred during childhood.     Lidocaine Itching    Lisinopril Cough    Losartan      Hyperkalemia      Metformin      Elevated lactic acid    Minocycline      Yellow Dye Allergy. Minocycline has Yellow Dye #10.    Mounjaro [Tirzepatide] Diarrhea and GI Disturbance    Salicylates Hives    Yellow Dye Hives     Rxn to yellow tablet. Eyes swelled shut.     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 S/p AVN ablation and single chamber PPM  Plan: Basic metabolic panel  (Ca, Cl, CO2, Creat,         Gluc, K, Na, BUN)            (I48.91) Atrial fibrillation, unspecified type (H)  Comment:   Plan: pt on eliquis " and metoprolol. Pt to follow up with cardiology for device check    (N39.0) Urinary tract infection without hematuria, site unspecified  Comment: pt still on keflex. Recd rechecking UA next week.  Plan: UA Macroscopic with reflex to Microscopic and         Culture - Lab Collect            (E66.01) Morbid obesity (H)  Comment:   Plan:       Helen Guerra PA-C

## 2024-07-24 ENCOUNTER — TELEPHONE (OUTPATIENT)
Dept: FAMILY MEDICINE | Facility: CLINIC | Age: 89
End: 2024-07-24
Payer: COMMERCIAL

## 2024-07-24 NOTE — TELEPHONE ENCOUNTER
----- Message from Helen Guerra sent at 7/24/2024  9:46 AM CDT -----  Call pt and let her know that her kidney function is stable and electrolytes are normal.

## 2024-07-24 NOTE — LETTER
July 24, 2024      Kristyn Andre  [address]  [place] [id number]        Dear ,    We are writing to inform you of your test results.    Kidney function is stable and electrolytes are normal.     Resulted Orders   Basic metabolic panel  (Ca, Cl, CO2, Creat, Gluc, K, Na, BUN)   Result Value Ref Range    Sodium 140 135 - 145 mmol/L    Potassium 5.2 3.4 - 5.3 mmol/L    Chloride 101 98 - 107 mmol/L    Carbon Dioxide (CO2) 27 22 - 29 mmol/L    Anion Gap 12 7 - 15 mmol/L    Urea Nitrogen 31.0 (H) 8.0 - 23.0 mg/dL    Creatinine 1.95 (H) 0.51 - 0.95 mg/dL    GFR Estimate 24 (L) >60 mL/min/1.73m2      Comment:      eGFR calculated using 2021 CKD-EPI equation.    Calcium 9.9 8.8 - 10.4 mg/dL      Comment:      Reference intervals for this test were updated on 7/16/2024 to reflect our healthy population more accurately. There may be differences in the flagging of prior results with similar values performed with this method. Those prior results can be interpreted in the context of the updated reference intervals.    Glucose 73 70 - 99 mg/dL       If you have any questions or concerns, please call the clinic at the number listed above.       Sincerely,      KATHLEEN Covarrubias

## 2024-07-30 ENCOUNTER — APPOINTMENT (OUTPATIENT)
Dept: GENERAL RADIOLOGY | Facility: CLINIC | Age: 89
DRG: 690 | End: 2024-07-30
Attending: EMERGENCY MEDICINE
Payer: COMMERCIAL

## 2024-07-30 ENCOUNTER — TELEPHONE (OUTPATIENT)
Dept: CARDIOLOGY | Facility: CLINIC | Age: 89
End: 2024-07-30
Payer: COMMERCIAL

## 2024-07-30 ENCOUNTER — HOSPITAL ENCOUNTER (EMERGENCY)
Facility: CLINIC | Age: 89
Discharge: HOME | DRG: 690 | End: 2024-07-31
Attending: EMERGENCY MEDICINE | Admitting: EMERGENCY MEDICINE
Payer: COMMERCIAL

## 2024-07-30 DIAGNOSIS — M54.16 LUMBAR RADICULOPATHY: ICD-10-CM

## 2024-07-30 PROCEDURE — 73562 X-RAY EXAM OF KNEE 3: CPT | Mod: RT

## 2024-07-30 PROCEDURE — 99284 EMERGENCY DEPT VISIT MOD MDM: CPT

## 2024-07-30 ASSESSMENT — ACTIVITIES OF DAILY LIVING (ADL)
ADLS_ACUITY_SCORE: 40
ADLS_ACUITY_SCORE: 40

## 2024-07-30 ASSESSMENT — COLUMBIA-SUICIDE SEVERITY RATING SCALE - C-SSRS
1. IN THE PAST MONTH, HAVE YOU WISHED YOU WERE DEAD OR WISHED YOU COULD GO TO SLEEP AND NOT WAKE UP?: NO
6. HAVE YOU EVER DONE ANYTHING, STARTED TO DO ANYTHING, OR PREPARED TO DO ANYTHING TO END YOUR LIFE?: NO
2. HAVE YOU ACTUALLY HAD ANY THOUGHTS OF KILLING YOURSELF IN THE PAST MONTH?: NO

## 2024-07-30 NOTE — TELEPHONE ENCOUNTER
[facility] Call Center    Phone Message    May a detailed message be left on voicemail: yes     Reason for Call: Other: Pt would like a call back as she stated she was to see a nurse about having there tape and stitches taken out from her pacemaker implant      Action Taken: Other: Cardio    Travel Screening: Not Applicable     Date of Service:

## 2024-07-30 NOTE — TELEPHONE ENCOUNTER
"Called patient back. She states that there was a piece of tape placed over her incision at her last check on 7/19 and was wondering when this coul come off. I explained that this can be removed at any time.  Patient was quite nervous and tearful about any removal of tape when in clinic on 7/19 and unfortunately her feelings are unchanged. She is very nervous about potential pain it might cause. She states that the pressure dressing was ripped off so quickly she though she \"might die.\" She did state that she still has home services coming to see her so she will have them assist with removing the steri strip if she is unable to do it herself.  ADAM RN  "

## 2024-07-31 ENCOUNTER — APPOINTMENT (OUTPATIENT)
Dept: GENERAL RADIOLOGY | Facility: CLINIC | Age: 89
DRG: 690 | End: 2024-07-31
Attending: EMERGENCY MEDICINE
Payer: COMMERCIAL

## 2024-07-31 VITALS
SYSTOLIC BLOOD PRESSURE: 128 MMHG | HEART RATE: 77 BPM | TEMPERATURE: 98 F | RESPIRATION RATE: 20 BRPM | DIASTOLIC BLOOD PRESSURE: 116 MMHG | OXYGEN SATURATION: 94 %

## 2024-07-31 PROCEDURE — 72100 X-RAY EXAM L-S SPINE 2/3 VWS: CPT

## 2024-07-31 PROCEDURE — 250N000013 HC RX MED GY IP 250 OP 250 PS 637: Performed by: EMERGENCY MEDICINE

## 2024-07-31 RX ORDER — OXYCODONE HYDROCHLORIDE 5 MG/1
5 TABLET ORAL EVERY 6 HOURS PRN
Qty: 8 TABLET | Refills: 0 | Status: ON HOLD | OUTPATIENT
Start: 2024-07-31 | End: 2024-08-07

## 2024-07-31 RX ORDER — OXYCODONE HYDROCHLORIDE 5 MG/1
5 TABLET ORAL ONCE
Status: COMPLETED | OUTPATIENT
Start: 2024-07-31 | End: 2024-07-31

## 2024-07-31 RX ORDER — CYCLOBENZAPRINE HCL 5 MG
5 TABLET ORAL ONCE
Status: COMPLETED | OUTPATIENT
Start: 2024-07-31 | End: 2024-07-31

## 2024-07-31 RX ORDER — CYCLOBENZAPRINE HCL 5 MG
5 TABLET ORAL 3 TIMES DAILY PRN
Qty: 10 TABLET | Refills: 0 | Status: SHIPPED | OUTPATIENT
Start: 2024-07-31 | End: 2024-08-17

## 2024-07-31 RX ADMIN — OXYCODONE HYDROCHLORIDE 5 MG: 5 TABLET ORAL at 02:55

## 2024-07-31 RX ADMIN — CYCLOBENZAPRINE 5 MG: 5 TABLET, FILM COATED ORAL at 02:55

## 2024-07-31 ASSESSMENT — ACTIVITIES OF DAILY LIVING (ADL)
ADLS_ACUITY_SCORE: 40

## 2024-07-31 NOTE — ED PROVIDER NOTES
Emergency Department Note      History of Present Illness     Chief Complaint   Knee Pain      HPI   Moira Andre is a 89 year old female, on anticoagulants, with a history of stenosis, CKD, hypertension, and osteoarthritis of her knee presenting with right knee pain. The patient has had trouble with her knee since she was a teenager. She got it replaced, there was an infection, so they took it out, put in a spacer, then put it back in. She has not had trouble since except for 3 days ago. The patient was leaning on her lift chair to get up, but accidentally knocked the remote down. When trying to grab it, she used her knee to knock something back into place. Ever since, she has experienced a pain in her right knee, groin, and butt. To sleep, she has been sitting in her lift bed because laying down hurts too much. The patient notes that usually her knee is swollen, however her ankle is also swollen.     Independent Historian   None    Review of External Notes   I reviewed a [facility] clinic note from 7/23/2024    Past Medical History     Medical History and Problem List   Aortic valve sclerosis  Arrhythmia  Aspirin allergy  Asthma  CKD   Congestive heart failure, unspecified  Depression  Diabetes mellitus   Diastolic dysfunction, left ventricle  HTN (hypertension)  Lactic acidosis  Migraine headache  Mitral stenosis  Myocardial infarction   Nephrolithiasis  osteoarthritis of knee   Obesity  Rheumatoid arthritis flare   Sleep apnea  TIA (transient ischaemic attack)  Ventral hernia, unspecified, without mention of obstruction or gangrene    Medications   Albuterol    Apixaban    Atorvastatin    Bupropion      cephalexin   Cholecalciferol   Duloxetine   Hydralazine   fluticasone-salmeterol   glimepiride   Losartan   metoprolol tartrate  nitroglycerin  pantoprazole   torsemide     Surgical History   Appendectomy  Biopsy breast  Cholecystectomy   Coronary angiography x2  Ablation av node  Pacemaker  Left heart  catheterization   Hysterectomy total abdominal  Knee replacement, right and left  Release carpal tunnel, bilateral  Right femoral artery pseudoaneurysm      Physical Exam     Patient Vitals for the past 24 hrs:   BP Temp Temp src Pulse Resp   07/31/24 0256 (!) 128/116 -- -- -- --   07/31/24 0100 (!) 157/77 98  F (36.7  C) Oral 77 20     Physical Exam    General: Alert, No distress. Nontoxic appearance  Head: No signs of trauma.   Mouth/Throat: Oropharynx moist.   Eyes: Conjunctivae are normal. Pupils are equal..   Neck: Normal range of motion.    CV: Appears well perfused.  Resp:No respiratory distress.   MSK: Normal range of motion. No obvious deformity.  Positive straight leg raise on the right  Neuro: The patient is alert and interactive. MEREDITH. Speech normal. GCS 15  Skin: No lesion or sign of trauma noted.   Psych: normal mood and affect. behavior is normal.     Diagnostics     Lab Results   Labs Ordered and Resulted from Time of ED Arrival to Time of ED Departure - No data to display    Imaging   Lumbar spine XR, 2-3 views   Final Result   IMPRESSION: Lack of true lateral projection limits evaluation. Severe disc degeneration at every level in the lumbar spine. Vertebral body heights are grossly maintained. Diffuse osseous demineralization. Extraspinal structures are unremarkable.      XR Knee Right 3 Views   Final Result   IMPRESSION: A total of 6 images are provided and show long stem, constrained total knee arthroplasty hardware with cemented components. There is no joint effusion, fracture, or evidence of hardware loosening. Soft tissue edema is observed in the    subcutaneous fat around the knee and proximal lower leg. Extensive atheromatous vascular calcification.          Independent Interpretation   Lumbar spine x-ray shows no evidence of fracture    ED Course      Medications Administered   Medications   oxyCODONE (ROXICODONE) tablet 5 mg (5 mg Oral $Given 7/31/24 0255)   cyclobenzaprine (FLEXERIL)  tablet 5 mg (5 mg Oral $Given 7/31/24 0255)       Procedures   Procedures     Discussion of Management   None    ED Course   ED Course as of 07/31/24 0427 Wed Jul 31, 2024   0244 I obtained the history and examined the patient as above.    0249 I rechecked and updated the patient.     0416 I rechecked and updated the patient.         Optional/Additional Documentation  None    Medical Decision Making / Diagnosis       DAVID Andre is a 89 year old female who presents with right knee and upper leg pain and radicular symptoms.  The patient did not sustain any trauma.     Lumbar spine and knee x-rays are negative for fracture. Advanced imaging with CT/MRI is not indicated at this time, but may be indicated in the future if symptoms fail to resolve.      The patient has not had a fever, saddle/perineal anesthesia, bilateral foot numbness, or bowel or bladder dysfunction.  There is no midline tenderness to percussion or clinical evidence of cauda equina syndrome, discitis, spinal/epidural space hematoma or epidural abscess and nothing in the history that raises red flags for these pathologies.  The neurological exam is normal.  The patient was advised that radiculopathy often takes significant time to resolve, and that follow up with primary care, neurology and/or neurosurgery will be indicated if symptoms do not improve. The patient will be discharged with pain medications to use as directed, and steroids to attempt to assist with nerve inflammation.  No heavy lifting, bending or twisting. Return if increasing pain, muscular weakness, or bowel or bladder dysfunction.      Disposition   The patient was discharged.     Diagnosis     ICD-10-CM    1. Lumbar radiculopathy  M54.16            Discharge Medications   New Prescriptions    CYCLOBENZAPRINE (FLEXERIL) 5 MG TABLET    Take 1 tablet (5 mg) by mouth 3 times daily as needed for muscle spasms    OXYCODONE (ROXICODONE) 5 MG TABLET    Take 1 tablet (5 mg) by  mouth every 6 hours as needed for pain         Scribe Disclosure:  I, Phoenix Peterson, am serving as a scribe at 2:55 AM on 7/31/2024 to document services personally performed by Gaston Pierce MD based on my observations and the provider's statements to me.        Gaston Pierce MD  07/31/24 3402

## 2024-07-31 NOTE — ED TRIAGE NOTES
"Pt arrives with R knee pain x \"over a week\". Pt reports she believes she injured it when reaching between couch and table for a remote. Pain 10/10. Total knee approximately 15 years ago. Pt reports knee swollen more than left.        "

## 2024-08-01 ENCOUNTER — APPOINTMENT (OUTPATIENT)
Dept: GENERAL RADIOLOGY | Facility: CLINIC | Age: 89
DRG: 690 | End: 2024-08-01
Attending: EMERGENCY MEDICINE
Payer: COMMERCIAL

## 2024-08-01 ENCOUNTER — APPOINTMENT (OUTPATIENT)
Dept: CT IMAGING | Facility: CLINIC | Age: 89
DRG: 690 | End: 2024-08-01
Attending: EMERGENCY MEDICINE
Payer: COMMERCIAL

## 2024-08-01 ENCOUNTER — HOSPITAL ENCOUNTER (EMERGENCY)
Facility: CLINIC | Age: 89
Discharge: HOME | DRG: 690 | End: 2024-08-01
Attending: EMERGENCY MEDICINE | Admitting: EMERGENCY MEDICINE
Payer: COMMERCIAL

## 2024-08-01 VITALS
HEIGHT: 67 IN | SYSTOLIC BLOOD PRESSURE: 167 MMHG | WEIGHT: 252 LBS | DIASTOLIC BLOOD PRESSURE: 84 MMHG | TEMPERATURE: 98 F | HEART RATE: 71 BPM | RESPIRATION RATE: 24 BRPM | OXYGEN SATURATION: 98 % | BODY MASS INDEX: 39.55 KG/M2

## 2024-08-01 DIAGNOSIS — N30.01 ACUTE CYSTITIS WITH HEMATURIA: ICD-10-CM

## 2024-08-01 DIAGNOSIS — S09.90XA CLOSED HEAD INJURY, INITIAL ENCOUNTER: ICD-10-CM

## 2024-08-01 DIAGNOSIS — S80.01XA CONTUSION OF RIGHT KNEE, INITIAL ENCOUNTER: ICD-10-CM

## 2024-08-01 LAB
ALBUMIN SERPL BCG-MCNC: 4.1 G/DL (ref 3.5–5.2)
ALBUMIN UR-MCNC: 200 MG/DL
ALP SERPL-CCNC: 75 U/L (ref 40–150)
ALT SERPL W P-5'-P-CCNC: 7 U/L (ref 0–50)
ANION GAP SERPL CALCULATED.3IONS-SCNC: 13 MMOL/L (ref 7–15)
APPEARANCE UR: ABNORMAL
AST SERPL W P-5'-P-CCNC: 17 U/L (ref 0–45)
BASE EXCESS BLDV CALC-SCNC: 2 MMOL/L (ref -3–3)
BASOPHILS # BLD AUTO: 0 10E3/UL (ref 0–0.2)
BASOPHILS NFR BLD AUTO: 0 %
BILIRUB SERPL-MCNC: 0.8 MG/DL
BILIRUB UR QL STRIP: NEGATIVE
BUN SERPL-MCNC: 37.9 MG/DL (ref 8–23)
CALCIUM SERPL-MCNC: 9.8 MG/DL (ref 8.8–10.4)
CHLORIDE SERPL-SCNC: 100 MMOL/L (ref 98–107)
COLOR UR AUTO: ABNORMAL
CREAT SERPL-MCNC: 2.26 MG/DL (ref 0.51–0.95)
EGFRCR SERPLBLD CKD-EPI 2021: 20 ML/MIN/1.73M2
EOSINOPHIL # BLD AUTO: 0.1 10E3/UL (ref 0–0.7)
EOSINOPHIL NFR BLD AUTO: 1 %
ERYTHROCYTE [DISTWIDTH] IN BLOOD BY AUTOMATED COUNT: 17 % (ref 10–15)
GLUCOSE SERPL-MCNC: 130 MG/DL (ref 70–99)
GLUCOSE UR STRIP-MCNC: NEGATIVE MG/DL
HCO3 BLDV-SCNC: 28 MMOL/L (ref 21–28)
HCO3 SERPL-SCNC: 27 MMOL/L (ref 22–29)
HCT VFR BLD AUTO: 42.2 % (ref 35–47)
HGB BLD-MCNC: 13.1 G/DL (ref 11.7–15.7)
HGB UR QL STRIP: ABNORMAL
HOLD SPECIMEN: NORMAL
IMM GRANULOCYTES # BLD: 0 10E3/UL
IMM GRANULOCYTES NFR BLD: 0 %
KETONES UR STRIP-MCNC: NEGATIVE MG/DL
LACTATE BLD-SCNC: 1.7 MMOL/L
LEUKOCYTE ESTERASE UR QL STRIP: ABNORMAL
LYMPHOCYTES # BLD AUTO: 1.2 10E3/UL (ref 0.8–5.3)
LYMPHOCYTES NFR BLD AUTO: 13 %
MCH RBC QN AUTO: 26.6 PG (ref 26.5–33)
MCHC RBC AUTO-ENTMCNC: 31 G/DL (ref 31.5–36.5)
MCV RBC AUTO: 86 FL (ref 78–100)
MONOCYTES # BLD AUTO: 0.8 10E3/UL (ref 0–1.3)
MONOCYTES NFR BLD AUTO: 8 %
NEUTROPHILS # BLD AUTO: 7.4 10E3/UL (ref 1.6–8.3)
NEUTROPHILS NFR BLD AUTO: 78 %
NITRATE UR QL: NEGATIVE
NRBC # BLD AUTO: 0 10E3/UL
NRBC BLD AUTO-RTO: 0 /100
NT-PROBNP SERPL-MCNC: 5198 PG/ML (ref 0–1800)
PCO2 BLDV: 49 MM HG (ref 40–50)
PH BLDV: 7.37 [PH] (ref 7.32–7.43)
PH UR STRIP: 6 [PH] (ref 5–7)
PLATELET # BLD AUTO: 240 10E3/UL (ref 150–450)
PO2 BLDV: 31 MM HG (ref 25–47)
POTASSIUM SERPL-SCNC: 4.5 MMOL/L (ref 3.4–5.3)
PROT SERPL-MCNC: 7.4 G/DL (ref 6.4–8.3)
RBC # BLD AUTO: 4.93 10E6/UL (ref 3.8–5.2)
RBC URINE: 70 /HPF
SAO2 % BLDV: 57 % (ref 70–75)
SODIUM SERPL-SCNC: 140 MMOL/L (ref 135–145)
SP GR UR STRIP: 1.01 (ref 1–1.03)
TROPONIN T SERPL HS-MCNC: 39 NG/L
UROBILINOGEN UR STRIP-MCNC: NORMAL MG/DL
WBC # BLD AUTO: 9.5 10E3/UL (ref 4–11)
WBC CLUMPS #/AREA URNS HPF: PRESENT /HPF
WBC URINE: >182 /HPF

## 2024-08-01 PROCEDURE — 250N000011 HC RX IP 250 OP 636: Performed by: EMERGENCY MEDICINE

## 2024-08-01 PROCEDURE — 87186 SC STD MICRODIL/AGAR DIL: CPT | Performed by: EMERGENCY MEDICINE

## 2024-08-01 PROCEDURE — 96365 THER/PROPH/DIAG IV INF INIT: CPT

## 2024-08-01 PROCEDURE — 82803 BLOOD GASES ANY COMBINATION: CPT

## 2024-08-01 PROCEDURE — 84484 ASSAY OF TROPONIN QUANT: CPT | Performed by: EMERGENCY MEDICINE

## 2024-08-01 PROCEDURE — 71046 X-RAY EXAM CHEST 2 VIEWS: CPT

## 2024-08-01 PROCEDURE — 83880 ASSAY OF NATRIURETIC PEPTIDE: CPT | Performed by: EMERGENCY MEDICINE

## 2024-08-01 PROCEDURE — 87086 URINE CULTURE/COLONY COUNT: CPT | Performed by: EMERGENCY MEDICINE

## 2024-08-01 PROCEDURE — 36415 COLL VENOUS BLD VENIPUNCTURE: CPT | Performed by: EMERGENCY MEDICINE

## 2024-08-01 PROCEDURE — 70450 CT HEAD/BRAIN W/O DYE: CPT

## 2024-08-01 PROCEDURE — 82247 BILIRUBIN TOTAL: CPT | Performed by: EMERGENCY MEDICINE

## 2024-08-01 PROCEDURE — 96375 TX/PRO/DX INJ NEW DRUG ADDON: CPT

## 2024-08-01 PROCEDURE — 73552 X-RAY EXAM OF FEMUR 2/>: CPT | Mod: RT

## 2024-08-01 PROCEDURE — 81001 URINALYSIS AUTO W/SCOPE: CPT | Performed by: EMERGENCY MEDICINE

## 2024-08-01 PROCEDURE — 85025 COMPLETE CBC W/AUTO DIFF WBC: CPT | Performed by: EMERGENCY MEDICINE

## 2024-08-01 PROCEDURE — 99285 EMERGENCY DEPT VISIT HI MDM: CPT | Mod: 25

## 2024-08-01 RX ORDER — CEPHALEXIN 500 MG/1
500 CAPSULE ORAL 2 TIMES DAILY
Qty: 14 CAPSULE | Refills: 0 | Status: ON HOLD | OUTPATIENT
Start: 2024-08-01 | End: 2024-08-07

## 2024-08-01 RX ORDER — ONDANSETRON 2 MG/ML
4 INJECTION INTRAMUSCULAR; INTRAVENOUS EVERY 30 MIN PRN
Status: DISCONTINUED | OUTPATIENT
Start: 2024-08-01 | End: 2024-08-01 | Stop reason: HOSPADM

## 2024-08-01 RX ORDER — HYDROMORPHONE HYDROCHLORIDE 1 MG/ML
0.3 INJECTION, SOLUTION INTRAMUSCULAR; INTRAVENOUS; SUBCUTANEOUS ONCE
Status: COMPLETED | OUTPATIENT
Start: 2024-08-01 | End: 2024-08-01

## 2024-08-01 RX ORDER — CEFTRIAXONE 1 G/1
1 INJECTION, POWDER, FOR SOLUTION INTRAMUSCULAR; INTRAVENOUS ONCE
Status: COMPLETED | OUTPATIENT
Start: 2024-08-01 | End: 2024-08-01

## 2024-08-01 RX ADMIN — HYDROMORPHONE HYDROCHLORIDE 0.3 MG: 1 INJECTION, SOLUTION INTRAMUSCULAR; INTRAVENOUS; SUBCUTANEOUS at 16:23

## 2024-08-01 RX ADMIN — CEFTRIAXONE SODIUM 1 G: 1 INJECTION, POWDER, FOR SOLUTION INTRAMUSCULAR; INTRAVENOUS at 17:44

## 2024-08-01 ASSESSMENT — ACTIVITIES OF DAILY LIVING (ADL)
ADLS_ACUITY_SCORE: 40
ADLS_ACUITY_SCORE: 42
ADLS_ACUITY_SCORE: 42

## 2024-08-01 ASSESSMENT — COLUMBIA-SUICIDE SEVERITY RATING SCALE - C-SSRS
6. HAVE YOU EVER DONE ANYTHING, STARTED TO DO ANYTHING, OR PREPARED TO DO ANYTHING TO END YOUR LIFE?: YES
1. IN THE PAST MONTH, HAVE YOU WISHED YOU WERE DEAD OR WISHED YOU COULD GO TO SLEEP AND NOT WAKE UP?: NO
2. HAVE YOU ACTUALLY HAD ANY THOUGHTS OF KILLING YOURSELF IN THE PAST MONTH?: NO

## 2024-08-01 NOTE — ED PROVIDER NOTES
Emergency Department Note      History of Present Illness     Chief Complaint   Fall and Altered Mental Status      HPI   Moira Andre is a 89 year old female with history CHF, diabetes, asthma, CKD stage 3, amongst others, presents from home after home health nurse came to see her and notes she has a bruise on her head and seems more confused than normal.  She uses oxygen when she needs it per patient's report and wasn't wearing it today, but was placed on oxygen per EMS.  Patient notes she was trying to get a remote control that fell between her legs and had a fall and notes right knee pain that radiates up to her thigh.  She notes she didn't call EMS but rather her care provider.    Independent Historian   Patient and EMS report    Review of External Notes   Clinic notes    Past Medical History     Medical History and Problem List   Past Medical History:   Diagnosis Date    Aortic valve sclerosis     Arrhythmia     Aspirin allergy     Asthma     CKD (chronic kidney disease) stage 3, GFR 30-59 ml/min (H)     Congestive heart failure, unspecified     Depression     Diabetes mellitus (H) 2010    Diastolic dysfunction, left ventricle 2013    HTN (hypertension)     Lactic acidosis 08/2018    Migraine headache     Mitral stenosis     Myocardial infarction (H) 9/2007, cath 2013 ml    Nephrolithiasis     OA (osteoarthritis) of knee     Obesity     Rheumatoid arthritis flare (H)     Sleep apnea     TIA (transient ischaemic attack)     Ventral hernia, unspecified, without mention of obstruction or gangrene        Medications   cephALEXin (KEFLEX) 500 MG capsule  acetaminophen (TYLENOL) 500 MG tablet  albuterol (PROAIR HFA/PROVENTIL HFA/VENTOLIN HFA) 108 (90 Base) MCG/ACT inhaler  apixaban ANTICOAGULANT (ELIQUIS) 2.5 MG tablet  atorvastatin (LIPITOR) 20 MG tablet  blood glucose (ACCU-CHEK GUIDE) test strip  blood glucose (ACCU-CHEK SOFTCLIX) lancing device  blood glucose monitoring (SOFTCLIX) lancets  buPROPion  (WELLBUTRIN SR) 100 MG 12 hr tablet  calcium carbonate (TUMS) 500 MG chewable tablet  cephALEXin (KEFLEX) 500 MG capsule  Cholecalciferol (VITAMIN D) 125 MCG (5000 UT) capsule  cyclobenzaprine (FLEXERIL) 5 MG tablet  DULoxetine (CYMBALTA) 60 MG capsule  fexofenadine (ALLEGRA) 60 MG tablet  fluticasone-salmeterol (WIXELA INHUB) 100-50 MCG/ACT inhaler  glimepiride (AMARYL) 2 MG tablet  Menthol, Topical Analgesic, (ICY HOT MEDICATED SPRAY EX)  metoprolol tartrate (LOPRESSOR) 25 MG tablet  miconazole (MICATIN) 2 % external cream  Multiple Vitamins-Minerals (HAIR SKIN AND NAILS FORMULA PO)  nitroGLYcerin (NITROSTAT) 0.4 MG sublingual tablet  oxyCODONE (ROXICODONE) 5 MG tablet  pantoprazole (PROTONIX) 20 MG EC tablet  torsemide (DEMADEX) 20 MG tablet  triamcinolone (KENALOG) 0.1 % external cream        Surgical History   Past Surgical History:   Procedure Laterality Date    APPENDECTOMY      BIOPSY BREAST      x2 -needle & lumpectomy-benign    CHOLECYSTECTOMY      CORONARY ANGIOGRAPHY ADULT ORDER  9/28/2007    Bare metal stent to OM1, Diagonal patent     CORONARY ANGIOGRAPHY ADULT ORDER  9/25/2007    [place] stent to Diagonal    EP ABLATION AV NODE N/A 7/12/2024    Procedure: Ablation Atrioventricular Node;  Surgeon: Davion Baron MD;  Location:  [facility]    EP PACEMAKER DEVICE & LEAD IMPLANT- RIGHT VENTRICULAR N/A 7/12/2024    Procedure: Pacemaker Device & Lead Implant- Right ventricular;  Surgeon: Davion Baron MD;  Location:  [facility]    HC LEFT HEART CATHETERIZATION  8/2013    Moderate CAD    HYSTERECTOMY TOTAL ABDOMINAL      ORTHOPEDIC SURGERY      knee replacement on right side (2006), Left side (2016)    RELEASE CARPAL TUNNEL      right and left    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       Physical Exam     Patient Vitals for the past 24 hrs:   BP Temp Temp src Pulse Resp SpO2 Height Weight   08/01/24 1709 (!) 167/84 -- -- 71 -- 98 % -- --   08/01/24 1631 -- --  "-- -- -- 97 % -- --   08/01/24 1630 127/66 -- -- 70 -- 97 % -- --   08/01/24 1601 (!) 150/99 -- -- -- -- -- -- --   08/01/24 1600 -- -- -- 79 24 99 % -- --   08/01/24 1558 -- 98  F (36.7  C) Oral -- -- -- 1.702 m (5' 7\") 114.3 kg (252 lb)     Physical Exam  GENERAL: well developed, pleasant  HEAD: moderate area of bruising to right forehead   EYES: pupils reactive, extraocular muscles intact, conjunctivae normal  ENT:  mucus membranes moist  NECK:  trachea midline, normal range of motion  RESPIRATORY: no tachypnea, breath sounds clear to auscultation   CVS: normal S1/S2, no murmurs, intact distal pulses  ABDOMEN: soft, nontender, nondistention  MUSCULOSKELETAL: no deformities  SKIN: large area tinea in skin folds around waist   NEURO: GCS 15, cranial nerves intact, alert and oriented x3  PSYCH:  Mood/affect normal      Diagnostics     Lab Results   Labs Ordered and Resulted from Time of ED Arrival to Time of ED Departure   COMPREHENSIVE METABOLIC PANEL - Abnormal       Result Value    Sodium 140      Potassium 4.5      Carbon Dioxide (CO2) 27      Anion Gap 13      Urea Nitrogen 37.9 (*)     Creatinine 2.26 (*)     GFR Estimate 20 (*)     Calcium 9.8      Chloride 100      Glucose 130 (*)     Alkaline Phosphatase 75      AST 17      ALT 7      Protein Total 7.4      Albumin 4.1      Bilirubin Total 0.8     ROUTINE UA WITH MICROSCOPIC REFLEX TO CULTURE - Abnormal    Color Urine Orange (*)     Appearance Urine Cloudy (*)     Glucose Urine Negative      Bilirubin Urine Negative      Ketones Urine Negative      Specific Gravity Urine 1.015      Blood Urine Moderate (*)     pH Urine 6.0      Protein Albumin Urine 200 (*)     Urobilinogen Urine Normal      Nitrite Urine Negative      Leukocyte Esterase Urine Large (*)     WBC Clumps Urine Present (*)     RBC Urine 70 (*)     WBC Urine >182 (*)    TROPONIN T, HIGH SENSITIVITY - Abnormal    Troponin T, High Sensitivity 39 (*)    NT PROBNP INPATIENT - Abnormal    N terminal " Pro BNP Inpatient 5,198 (*)    CBC WITH PLATELETS AND DIFFERENTIAL - Abnormal    WBC Count 9.5      RBC Count 4.93      Hemoglobin 13.1      Hematocrit 42.2      MCV 86      MCH 26.6      MCHC 31.0 (*)     RDW 17.0 (*)     Platelet Count 240      % Neutrophils 78      % Lymphocytes 13      % Monocytes 8      % Eosinophils 1      % Basophils 0      % Immature Granulocytes 0      NRBCs per 100 WBC 0      Absolute Neutrophils 7.4      Absolute Lymphocytes 1.2      Absolute Monocytes 0.8      Absolute Eosinophils 0.1      Absolute Basophils 0.0      Absolute Immature Granulocytes 0.0      Absolute NRBCs 0.0     ISTAT GASES LACTATE VENOUS POCT - Abnormal    Lactic Acid POCT 1.7      Bicarbonate Venous POCT 28      O2 Sat, Venous POCT 57 (*)     pCO2 Venous POCT 49      pH Venous POCT 7.37      pO2 Venous POCT 31      Base Excess/Deficit (+/-) POCT 2.0     URINE CULTURE       Imaging   XR Femur Right 2 Views   Final Result   IMPRESSION: The right hip is negative for fracture or dislocation. Postoperative changes right total knee arthroplasty. Components appear well seated. No evidence for acute fracture. No evidence for loosening. Vascular calcifications.      XR Chest 2 Views   Preliminary Result   IMPRESSION: No change in single lead cardiac pacer. Heart size is prominent. There is mild pulmonary vascular congestion. No lobar infiltrate or pleural effusion. No pneumothorax. The osseous structures are intact.      CT Head w/o Contrast   Final Result   IMPRESSION:   1.  No CT evidence for acute intracranial process.   2.  Brain atrophy and presumed chronic microvascular ischemic changes as above.              Independent Interpretation   None    ED Course      Medications Administered   Medications   ondansetron (ZOFRAN) injection 4 mg (has no administration in time range)   cefTRIAXone (ROCEPHIN) 1 g vial to attach to  mL bag for ADULTS or NS 50 mL bag for PEDS (has no administration in time range)   HYDROmorphone  (PF) (DILAUDID) injection 0.3 mg (0.3 mg Intravenous $Given 8/1/24 1265)       Procedures   Procedures     Discussion of Management   None    ED Course        Additional Documentation  None    Medical Decision Making / Diagnosis     CMS Diagnoses: None    MIPS       None    MDM   Moira Andre is a 89 year old female presents with fall and confusion with concerns for intracranial hemorrhage given anticoagulation and contusion to her forehead.  CT head is negative.  Patient also complains of right knee and thigh pain and x-ray of the femur is negative for hip or knee fracture.  Straight cath urine looks concerning for recurrent UTI although she just finished a dose of antibiotics but reviewing prior urine cultures it is pansensitive.  Patient looks to have some concerns for poor hygiene given body habitus advanced age.  Lactic acid is normal.  Other labs look to be stable in terms of troponin and BNP.  Patient is on oxygen at home and looks to be at baseline.  Discussed going home with her and she feels comfortable with this.  She was given a dose of IV antibiotics.  She is awake and alert in no evidence of confusion.    Disposition   The patient was discharged.     Diagnosis     ICD-10-CM    1. Closed head injury, initial encounter  S09.90XA       2. Contusion of right knee, initial encounter  S80.01XA       3. Acute cystitis with hematuria  N30.01            Discharge Medications   New Prescriptions    CEPHALEXIN (KEFLEX) 500 MG CAPSULE    Take 1 capsule (500 mg) by mouth 2 times daily for 7 days         MD Reji Khan Shaun L, MD  08/01/24 8254

## 2024-08-01 NOTE — ED TRIAGE NOTES
Patient BIBA from her apartment where she lives alone. Patient has a nurse come every 2 days to check on her.    Patient fell on Tuesday and hit her head. EMS noted a contusion to forehead. Patient typically wears 2L oxygen at all times, when nurse arrived patient was not on her oxygen and seemed more confused than normal. EMS applied oxygen, patient alert and oriented X4. VSS for EMS. Patient also reports chronic right knee pain. No headache, n/v. Patient takes Eliquis.    Glucose for EMS: 138

## 2024-08-02 ENCOUNTER — PATIENT OUTREACH (OUTPATIENT)
Dept: CARE COORDINATION | Facility: CLINIC | Age: 89
End: 2024-08-02
Payer: COMMERCIAL

## 2024-08-03 ENCOUNTER — APPOINTMENT (OUTPATIENT)
Dept: GENERAL RADIOLOGY | Facility: CLINIC | Age: 89
DRG: 690 | End: 2024-08-03
Attending: EMERGENCY MEDICINE
Payer: COMMERCIAL

## 2024-08-03 ENCOUNTER — HOSPITAL ENCOUNTER (INPATIENT)
Facility: CLINIC | Age: 89
LOS: 4 days | Discharge: NURSING, REHAB OR LONG-TERM CARE | DRG: 690 | End: 2024-08-07
Attending: EMERGENCY MEDICINE | Admitting: INTERNAL MEDICINE
Payer: COMMERCIAL

## 2024-08-03 ENCOUNTER — APPOINTMENT (OUTPATIENT)
Dept: CT IMAGING | Facility: CLINIC | Age: 89
DRG: 690 | End: 2024-08-03
Attending: EMERGENCY MEDICINE
Payer: COMMERCIAL

## 2024-08-03 DIAGNOSIS — I50.30 HEART FAILURE WITH PRESERVED EJECTION FRACTION, NYHA CLASS I (H): ICD-10-CM

## 2024-08-03 DIAGNOSIS — S00.83XA CONTUSION OF FACE, INITIAL ENCOUNTER: ICD-10-CM

## 2024-08-03 DIAGNOSIS — R29.6 RECURRENT FALLS: ICD-10-CM

## 2024-08-03 DIAGNOSIS — N39.0 URINARY TRACT INFECTION WITHOUT HEMATURIA, SITE UNSPECIFIED: Primary | ICD-10-CM

## 2024-08-03 DIAGNOSIS — M25.561 ACUTE PAIN OF RIGHT KNEE: ICD-10-CM

## 2024-08-03 DIAGNOSIS — N30.01 ACUTE CYSTITIS WITH HEMATURIA: ICD-10-CM

## 2024-08-03 LAB
ALBUMIN UR-MCNC: 50 MG/DL
ANION GAP SERPL CALCULATED.3IONS-SCNC: 13 MMOL/L (ref 7–15)
APPEARANCE UR: ABNORMAL
BACTERIA #/AREA URNS HPF: ABNORMAL /HPF
BASOPHILS # BLD AUTO: 0 10E3/UL (ref 0–0.2)
BASOPHILS NFR BLD AUTO: 0 %
BILIRUB UR QL STRIP: NEGATIVE
BUN SERPL-MCNC: 38.4 MG/DL (ref 8–23)
CALCIUM SERPL-MCNC: 9.7 MG/DL (ref 8.8–10.4)
CHLORIDE SERPL-SCNC: 99 MMOL/L (ref 98–107)
COLOR UR AUTO: YELLOW
CREAT SERPL-MCNC: 2.27 MG/DL (ref 0.51–0.95)
EGFRCR SERPLBLD CKD-EPI 2021: 20 ML/MIN/1.73M2
EOSINOPHIL # BLD AUTO: 0.1 10E3/UL (ref 0–0.7)
EOSINOPHIL NFR BLD AUTO: 1 %
ERYTHROCYTE [DISTWIDTH] IN BLOOD BY AUTOMATED COUNT: 16.6 % (ref 10–15)
GLUCOSE SERPL-MCNC: 185 MG/DL (ref 70–99)
GLUCOSE UR STRIP-MCNC: NEGATIVE MG/DL
HCO3 SERPL-SCNC: 26 MMOL/L (ref 22–29)
HCT VFR BLD AUTO: 42 % (ref 35–47)
HGB BLD-MCNC: 12.8 G/DL (ref 11.7–15.7)
HGB UR QL STRIP: ABNORMAL
IMM GRANULOCYTES # BLD: 0.1 10E3/UL
IMM GRANULOCYTES NFR BLD: 1 %
KETONES UR STRIP-MCNC: NEGATIVE MG/DL
LEUKOCYTE ESTERASE UR QL STRIP: ABNORMAL
LYMPHOCYTES # BLD AUTO: 1.2 10E3/UL (ref 0.8–5.3)
LYMPHOCYTES NFR BLD AUTO: 12 %
MCH RBC QN AUTO: 26.6 PG (ref 26.5–33)
MCHC RBC AUTO-ENTMCNC: 30.5 G/DL (ref 31.5–36.5)
MCV RBC AUTO: 87 FL (ref 78–100)
MONOCYTES # BLD AUTO: 0.8 10E3/UL (ref 0–1.3)
MONOCYTES NFR BLD AUTO: 8 %
NEUTROPHILS # BLD AUTO: 7.9 10E3/UL (ref 1.6–8.3)
NEUTROPHILS NFR BLD AUTO: 78 %
NITRATE UR QL: NEGATIVE
NRBC # BLD AUTO: 0 10E3/UL
NRBC BLD AUTO-RTO: 0 /100
PH UR STRIP: 5.5 [PH] (ref 5–7)
PLATELET # BLD AUTO: 239 10E3/UL (ref 150–450)
POTASSIUM SERPL-SCNC: 4.6 MMOL/L (ref 3.4–5.3)
RBC # BLD AUTO: 4.81 10E6/UL (ref 3.8–5.2)
RBC URINE: 61 /HPF
SODIUM SERPL-SCNC: 138 MMOL/L (ref 135–145)
SP GR UR STRIP: 1.02 (ref 1–1.03)
UROBILINOGEN UR STRIP-MCNC: NORMAL MG/DL
WBC # BLD AUTO: 10.1 10E3/UL (ref 4–11)
WBC URINE: 61 /HPF

## 2024-08-03 PROCEDURE — 72125 CT NECK SPINE W/O DYE: CPT

## 2024-08-03 PROCEDURE — G0378 HOSPITAL OBSERVATION PER HR: HCPCS

## 2024-08-03 PROCEDURE — 120N000001 HC R&B MED SURG/OB

## 2024-08-03 PROCEDURE — 36415 COLL VENOUS BLD VENIPUNCTURE: CPT | Performed by: EMERGENCY MEDICINE

## 2024-08-03 PROCEDURE — 85025 COMPLETE CBC W/AUTO DIFF WBC: CPT | Performed by: EMERGENCY MEDICINE

## 2024-08-03 PROCEDURE — 96365 THER/PROPH/DIAG IV INF INIT: CPT

## 2024-08-03 PROCEDURE — 99285 EMERGENCY DEPT VISIT HI MDM: CPT | Mod: 25

## 2024-08-03 PROCEDURE — 73030 X-RAY EXAM OF SHOULDER: CPT | Mod: RT

## 2024-08-03 PROCEDURE — 250N000013 HC RX MED GY IP 250 OP 250 PS 637: Performed by: INTERNAL MEDICINE

## 2024-08-03 PROCEDURE — 73502 X-RAY EXAM HIP UNI 2-3 VIEWS: CPT

## 2024-08-03 PROCEDURE — 80048 BASIC METABOLIC PNL TOTAL CA: CPT | Performed by: EMERGENCY MEDICINE

## 2024-08-03 PROCEDURE — 250N000013 HC RX MED GY IP 250 OP 250 PS 637: Performed by: EMERGENCY MEDICINE

## 2024-08-03 PROCEDURE — 99222 1ST HOSP IP/OBS MODERATE 55: CPT | Performed by: INTERNAL MEDICINE

## 2024-08-03 PROCEDURE — 250N000011 HC RX IP 250 OP 636: Performed by: EMERGENCY MEDICINE

## 2024-08-03 PROCEDURE — 81001 URINALYSIS AUTO W/SCOPE: CPT | Performed by: EMERGENCY MEDICINE

## 2024-08-03 RX ORDER — DULOXETIN HYDROCHLORIDE 60 MG/1
60 CAPSULE, DELAYED RELEASE ORAL EVERY EVENING
Status: DISCONTINUED | OUTPATIENT
Start: 2024-08-03 | End: 2024-08-03

## 2024-08-03 RX ORDER — CEFTRIAXONE 1 G/1
1 INJECTION, POWDER, FOR SOLUTION INTRAMUSCULAR; INTRAVENOUS EVERY 24 HOURS
Status: DISCONTINUED | OUTPATIENT
Start: 2024-08-04 | End: 2024-08-05

## 2024-08-03 RX ORDER — ONDANSETRON 4 MG/1
4 TABLET, ORALLY DISINTEGRATING ORAL EVERY 6 HOURS PRN
Status: DISCONTINUED | OUTPATIENT
Start: 2024-08-03 | End: 2024-08-07 | Stop reason: HOSPADM

## 2024-08-03 RX ORDER — AMOXICILLIN 250 MG
1 CAPSULE ORAL 2 TIMES DAILY PRN
Status: DISCONTINUED | OUTPATIENT
Start: 2024-08-03 | End: 2024-08-07 | Stop reason: HOSPADM

## 2024-08-03 RX ORDER — DULOXETIN HYDROCHLORIDE 60 MG/1
60 CAPSULE, DELAYED RELEASE ORAL EVERY EVENING
Status: DISCONTINUED | OUTPATIENT
Start: 2024-08-03 | End: 2024-08-07 | Stop reason: HOSPADM

## 2024-08-03 RX ORDER — PANTOPRAZOLE SODIUM 20 MG/1
20 TABLET, DELAYED RELEASE ORAL DAILY
Status: DISCONTINUED | OUTPATIENT
Start: 2024-08-04 | End: 2024-08-07 | Stop reason: HOSPADM

## 2024-08-03 RX ORDER — AMOXICILLIN 250 MG
2 CAPSULE ORAL 2 TIMES DAILY PRN
Status: DISCONTINUED | OUTPATIENT
Start: 2024-08-03 | End: 2024-08-07 | Stop reason: HOSPADM

## 2024-08-03 RX ORDER — TORSEMIDE 20 MG/1
20 TABLET ORAL
Status: DISCONTINUED | OUTPATIENT
Start: 2024-08-03 | End: 2024-08-07 | Stop reason: HOSPADM

## 2024-08-03 RX ORDER — NITROGLYCERIN 0.4 MG/1
0.4 TABLET SUBLINGUAL EVERY 5 MIN PRN
Status: DISCONTINUED | OUTPATIENT
Start: 2024-08-03 | End: 2024-08-07 | Stop reason: HOSPADM

## 2024-08-03 RX ORDER — ACETAMINOPHEN 325 MG/1
975 TABLET ORAL 3 TIMES DAILY
Status: DISCONTINUED | OUTPATIENT
Start: 2024-08-03 | End: 2024-08-03

## 2024-08-03 RX ORDER — ACETAMINOPHEN 325 MG/1
975 TABLET ORAL 3 TIMES DAILY
Status: DISCONTINUED | OUTPATIENT
Start: 2024-08-03 | End: 2024-08-07 | Stop reason: HOSPADM

## 2024-08-03 RX ORDER — CEFTRIAXONE 2 G/1
2 INJECTION, POWDER, FOR SOLUTION INTRAMUSCULAR; INTRAVENOUS ONCE
Status: COMPLETED | OUTPATIENT
Start: 2024-08-03 | End: 2024-08-03

## 2024-08-03 RX ORDER — ALBUTEROL SULFATE 90 UG/1
2 AEROSOL, METERED RESPIRATORY (INHALATION) EVERY 6 HOURS PRN
Status: DISCONTINUED | OUTPATIENT
Start: 2024-08-03 | End: 2024-08-07 | Stop reason: HOSPADM

## 2024-08-03 RX ORDER — ACETAMINOPHEN 325 MG/1
650 TABLET ORAL ONCE
Status: COMPLETED | OUTPATIENT
Start: 2024-08-03 | End: 2024-08-03

## 2024-08-03 RX ORDER — ONDANSETRON 2 MG/ML
4 INJECTION INTRAMUSCULAR; INTRAVENOUS EVERY 6 HOURS PRN
Status: DISCONTINUED | OUTPATIENT
Start: 2024-08-03 | End: 2024-08-07 | Stop reason: HOSPADM

## 2024-08-03 RX ORDER — BUPROPION HYDROCHLORIDE 100 MG/1
100 TABLET, EXTENDED RELEASE ORAL EVERY EVENING
Status: DISCONTINUED | OUTPATIENT
Start: 2024-08-03 | End: 2024-08-07 | Stop reason: HOSPADM

## 2024-08-03 RX ORDER — BUPROPION HYDROCHLORIDE 100 MG/1
100 TABLET, EXTENDED RELEASE ORAL EVERY EVENING
Status: DISCONTINUED | OUTPATIENT
Start: 2024-08-03 | End: 2024-08-03

## 2024-08-03 RX ORDER — TORSEMIDE 20 MG/1
20 TABLET ORAL
Status: DISCONTINUED | OUTPATIENT
Start: 2024-08-03 | End: 2024-08-03

## 2024-08-03 RX ORDER — ATORVASTATIN CALCIUM 20 MG/1
20 TABLET, FILM COATED ORAL DAILY
Status: DISCONTINUED | OUTPATIENT
Start: 2024-08-04 | End: 2024-08-07 | Stop reason: HOSPADM

## 2024-08-03 RX ORDER — ACETAMINOPHEN 500 MG
1000 TABLET ORAL 2 TIMES DAILY
Status: DISCONTINUED | OUTPATIENT
Start: 2024-08-03 | End: 2024-08-03

## 2024-08-03 RX ADMIN — CEFTRIAXONE SODIUM 2 G: 2 INJECTION, POWDER, FOR SOLUTION INTRAMUSCULAR; INTRAVENOUS at 15:07

## 2024-08-03 RX ADMIN — METOPROLOL TARTRATE 75 MG: 50 TABLET, FILM COATED ORAL at 22:15

## 2024-08-03 RX ADMIN — TORSEMIDE 20 MG: 20 TABLET ORAL at 22:19

## 2024-08-03 RX ADMIN — ACETAMINOPHEN 650 MG: 325 TABLET, FILM COATED ORAL at 13:06

## 2024-08-03 RX ADMIN — BUPROPION HYDROCHLORIDE 100 MG: 100 TABLET, FILM COATED, EXTENDED RELEASE ORAL at 22:19

## 2024-08-03 RX ADMIN — ACETAMINOPHEN 975 MG: 325 TABLET, FILM COATED ORAL at 22:15

## 2024-08-03 RX ADMIN — DULOXETINE HYDROCHLORIDE 60 MG: 60 CAPSULE, DELAYED RELEASE ORAL at 22:15

## 2024-08-03 RX ADMIN — APIXABAN 2.5 MG: 2.5 TABLET, FILM COATED ORAL at 22:15

## 2024-08-03 ASSESSMENT — ACTIVITIES OF DAILY LIVING (ADL)
ADLS_ACUITY_SCORE: 40

## 2024-08-03 NOTE — ED NOTES
Bed: ED21  Expected date:   Expected time:   Means of arrival:   Comments:  Hems [address] 89 F right shoulder and hip pain diff moving pain meds given eta 1223

## 2024-08-03 NOTE — PHARMACY-ADMISSION MEDICATION HISTORY
Pharmacist Admission Medication History    Admission medication history is complete. The information provided in this note is only as accurate as the sources available at the time of the update.    Information Source(s): Patient via in-person    Pertinent Information: Patient has questionable compliance with medications. Unsure if she's taking glimepiride, confusion about if she is on medication for her blood sugar or not. She reports Wixela inhaler once daily, but not filled since January 2024. She notes she was told to take fexofenadine 60 mg daily but cannot find this tablet strength at the store.     Changes made to PTA medication list:  Added: None  Deleted: None  Changed: None    Allergies reviewed with patient and updates made in EHR: no    Medication History Completed By: Chanda Lopez [facility] 8/3/2024 4:22 PM    PTA Med List   Medication Sig Last Dose    acetaminophen (TYLENOL) 500 MG tablet Take 1,000 mg by mouth 2 times daily 8/2/2024 at pm    albuterol (PROAIR HFA/PROVENTIL HFA/VENTOLIN HFA) 108 (90 Base) MCG/ACT inhaler Inhale 2 puffs into the lungs every 6 hours as needed for shortness of breath, wheezing or cough For shortness of breath  at prn    apixaban ANTICOAGULANT (ELIQUIS) 2.5 MG tablet Take 1 tablet (2.5 mg) by mouth 2 times daily 8/2/2024 at pm    buPROPion (WELLBUTRIN SR) 100 MG 12 hr tablet Take 1 tablet (100 mg) by mouth daily for mood 8/2/2024 at pm    calcium carbonate (TUMS) 500 MG chewable tablet Take 1 chew tab by mouth 2 times daily as needed for heartburn  at prn    cephALEXin (KEFLEX) 500 MG capsule Take 1 capsule (500 mg) by mouth 2 times daily for 7 days 8/2/2024 at pm    Cholecalciferol (VITAMIN D) 125 MCG (5000 UT) capsule Take 1 tablet by mouth every evening 8/2/2024 at pm    cyclobenzaprine (FLEXERIL) 5 MG tablet Take 1 tablet (5 mg) by mouth 3 times daily as needed for muscle spasms  at prn    DULoxetine (CYMBALTA) 60 MG capsule Take 1 capsule (60 mg) by mouth daily 8/2/2024 at  pm    fexofenadine (ALLEGRA) 60 MG tablet Take 60 mg by mouth daily Past Week    fluticasone-salmeterol (WIXELA INHUB) 100-50 MCG/ACT inhaler USE 1 INHALATION BY MOUTH EVERY  12 HOURS (Patient taking differently: Inhale 1 puff into the lungs daily USE 1 INHALATION BY MOUTH EVERY  12 HOURS) 8/2/2024 at am    glimepiride (AMARYL) 2 MG tablet Take 1 tablet (2 mg) by mouth 2 times daily (before meals)     Menthol, Topical Analgesic, (ICY HOT MEDICATED SPRAY EX) Externally apply topically daily as needed (pain, in neck, back, hand)  at prn    metoprolol tartrate (LOPRESSOR) 25 MG tablet Take 3 tablets (75 mg) by mouth 2 times daily 8/2/2024 at pm    miconazole (MICATIN) 2 % external cream Apply topically 2 times daily as needed for other (rash/irritation)  at prn    nitroGLYcerin (NITROSTAT) 0.4 MG sublingual tablet FOR CHEST PAIN PLACE 1 TABLET UNDER THE TONGUE EVERY 5 MINUTES FOR 3 DOSES. IF SYMPTOMS PERSIST 5 MINUTES AFTER 1ST DOSE CALL 911  at prn    oxyCODONE (ROXICODONE) 5 MG tablet Take 1 tablet (5 mg) by mouth every 6 hours as needed for pain  at prn    pantoprazole (PROTONIX) 20 MG EC tablet Take 1 tablet (20 mg) by mouth daily 8/2/2024 at am    torsemide (DEMADEX) 20 MG tablet Take 2 tablets (40 mg) by mouth daily (Patient taking differently: Take 20 mg by mouth 2 times daily) 8/2/2024 at x2    triamcinolone (KENALOG) 0.1 % external cream Apply topically 2 times daily as needed for irritation  at prn

## 2024-08-03 NOTE — ED NOTES
[facility]  ED Nurse Handoff Report    ED Chief complaint: right shoulder/arm/hip pain      ED Diagnosis:   Final diagnoses:   None       Code Status: Hospitalist to discuss with patient    Allergies:   Allergies   Allergen Reactions    Aspirin Hives     Reaction occurred during childhood.     Lidocaine Itching    Lisinopril Cough    Losartan      Hyperkalemia      Metformin      Elevated lactic acid    Minocycline      Yellow Dye Allergy. Minocycline has Yellow Dye #10.    Mounjaro [Tirzepatide] Diarrhea and GI Disturbance    Salicylates Hives    Yellow Dye Hives     Rxn to yellow tablet. Eyes swelled shut.     Yellow Dyes (Non-Tartrazine) Hives       Patient Story: Pt BIB EMS, with worsening R hip/arm/shoulder pain (from fall earlier this week on Monday or Tuesday) and new L shoulder pain. Pt lives independently and was unable to get out of her chair due to the pain, despite having oxycodone and muscle relaxer this AM. Pt required heavy assistance from first responders to get out of the chair. Prior to fall earlier this week, pt's was able to ambulate with a RW. Of note, pt has a bruise on head from fall earlier this week. Pt is alert and oriented.   Focused Assessment:  Painful right shoulder/arm/hip    Treatments and/or interventions provided:   Medications   cefTRIAXone (ROCEPHIN) 2 g vial to attach to  ml bag for ADULTS or NS 50 ml bag for PEDS (has no administration in time range)   acetaminophen (TYLENOL) tablet 650 mg (650 mg Oral $Given 8/3/24 0886)     CT Cervical Spine w/o Contrast   Final Result   IMPRESSION:   1.  No fracture or posttraumatic subluxation.   2.  Osteopenia with degenerative change as above. No high-grade spinal canal or neural foraminal stenosis.      XR Pelvis w Hip Right 1 View   Final Result   IMPRESSION: Somewhat limited evaluation due to body habitus, positioning, and osteopenia. No acute fracture is identified. There is normal joint alignment. Moderate  "degenerative changes throughout the pelvis. Severe degenerative changes of the lower    lumbar spine. Partially imaged hardware in the right femoral diaphysis. Atherosclerosis.      XR Shoulder Right G/E 3 Views   Final Result   IMPRESSION: Somewhat limited evaluation due to external artifact, osteopenia, and body habitus. No acute fracture or malalignment. Moderate glenohumeral and acromioclavicular joint degenerative changes. Cortical irregularity at the greater tuberosity    suggests chronic rotator cuff pathology. Central venous catheter.        Patient's response to treatments and/or interventions: Pending    To be done/followed up on inpatient unit:  Per MD orders    Does this patient have any cognitive concerns?: Forgetful    Activity level - Baseline/Home:  Walker  Activity Level - Current:   Total Care    Patient's Preferred language: English   Needed?: No    Isolation: None  Infection: Not Applicable  Patient tested for COVID 19 prior to admission: NO  Bariatric?: Yes    Vital Signs:   Vitals:    08/03/24 1243 08/03/24 1300 08/03/24 1405 08/03/24 1418   BP: (!) 142/60 (!) 145/71 (!) 142/68    Pulse:  70 72    Resp: 22 22 20    Temp:    (P) 98.1  F (36.7  C)   TempSrc:    (P) Oral   SpO2: 93% 95% 94%    Weight: 114.3 kg (252 lb)      Height: 1.702 m (5' 7\")          Cardiac Rhythm:     Was the PSS-3 completed:   Yes  What interventions are required if any?  none    For the majority of the shift this patient's behavior was Green.   Behavioral interventions performed were none.    ED NURSE PHONE NUMBER: *[id number]         "

## 2024-08-03 NOTE — H&P
[facility]    History and Physical - Hospitalist Service       Date of Admission:  8/3/2024    Assessment & Plan   Moira Andre is a 89 year old female with history of CHF, CKD, and hypertension who presents with her daughter in law via EMS for neck and right-sided pain. Patient reports that four days ago, she was getting out of bed and walked a few steps when she fell, landing on the heating register. She was able to get up on her own afterwards. Kristyn was seen in the ED twice for this fall earlier this week. She has not fallen again since then. Currently, patient has pain in her neck, right shoulder, right hip, right groin, and right leg, and daughter in law says that these symtoms were present prior to the fall. She currently has a UTI and daughter in law is concerned that she has not been taking her antibiotic. Last pain medication was taken around 0-11am this morning. Kristyn lives at home alone and has a home health nurse visit daily though they do not help with her medications. Daughter in law thinks that the patient's mentation has been declining and living at home alone may no longer be safe.     ## UTI  ## Early RLE cellulitis  Patient has failed outpatient management of her UTI, she was discharge with antibiotics and has been too weak and unable to get up and take her PO medications and unable to care for her cat as well.  She had fallen just prior to her 1st ER admission.  Admit to inpatient (failed outpatient treatment  IV Ceftriaxone  Await UCx  PT/OT evaluation  May need TCU    ## CKD stage IV  Cr is at baseline  Continue Torsemide    ## Atrial fibrillation   ## Chronic anticoagulation  ## PPM single chamber post AVN ablation  ## Hx of diastolic CHF  Patient recently underwent AVN ablation and single chamber PPM placement.  Continue Eliquis  Continue Torsemide    ## ASCVD with PCI OM  ## Mild-Mod Aortic stenosis  ## Moderate mitral stenosis  ## Hyperlipidemia  Continue  "plavix  Continue lipitor  Continue Metoprolol    ## Depression  Continue Wellbutrin  Continue Cymbalta    ## GERD  Continue Protonix    ## Asthma  Continue albuterol MDI    Diet:  Cardiac  DVT Prophylaxis: DOAC  Charles Catheter: Not present  Lines: None     Cardiac Monitoring: None  Code Status:  FULL    Clinically Significant Risk Factors Present on Admission     # Drug Induced Coagulation Defect: home medication list includes an anticoagulant medication    # Hypertension: Noted on problem list  # Chronic heart failure with preserved ejection fraction: heart failure noted on problem list and last echo with EF >50%       # DMII: A1C = 7.2 % (Ref range: 0.0 - 5.6 %) within past 6 months    # Obesity: Estimated body mass index is 39.47 kg/m  as calculated from the following:    Height as of this encounter: 1.702 m (5' 7\").    Weight as of this encounter: 114.3 kg (252 lb).       # Financial/Environmental Concerns:    # Asthma: noted on problem list  # Pacemaker present       Disposition Plan     Medically Ready for Discharge: Anticipated in 2-4 Days           Sy Grimaldo MD  Hospitalist Service  [facility]  Securely message with [employer] (more info)  Text page via [facility] Paging/Directory     ______________________________________________________________________    Chief Complaint   Right should, arm and hip pain    History is obtained from the patient, electronic health record, and emergency department physician    History of Present Illness   Moira Andre is a 89 year old female with history of CHF, CKD, and hypertension who presents with her daughter in law via EMS for neck and right-sided pain. Patient reports that four days ago, she was getting out of bed and walked a few steps when she fell, landing on the heating register. She was able to get up on her own afterwards. Kristyn was seen in the ED twice for this fall earlier this week. She has not fallen again since then. Currently, " patient has pain in her neck, right shoulder, right hip, right groin, and right leg, and daughter in law says that these symtoms were present prior to the fall. She currently has a UTI and daughter in law is concerned that she has not been taking her antibiotic. Last pain medication was taken around 0-11am this morning. Kristyn lives at home alone and has a home health nurse visit daily though they do not help with her medications. Daughter in law thinks that the patient's mentation has been declining and living at home alone may no longer be safe.     She had been admitted 2 times to our ER recently, the last time for UTI for which she was given antibiotics which she was unable to take due to profound weakness and confusion.      Past Medical History    Past Medical History:   Diagnosis Date    Aortic valve sclerosis     heart murmur, no AS    Arrhythmia     PAT, PVC    Aspirin allergy     Plavix use long term    Asthma     CKD (chronic kidney disease) stage 3, GFR 30-59 ml/min (H)     x 2007 atleast    Congestive heart failure, unspecified     Depression     Diabetes mellitus (H) 2010    Diastolic dysfunction, left ventricle 2013    grade 2, nl ef    HTN (hypertension)     Lactic acidosis 08/2018    due to dehydration and metformin    Migraine headache     Mitral stenosis     mild, 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    Myocardial infarction (H) 9/2007, cath 2013 ml    BMS: stent to OM, diag, nl EF, echo /C angia 2013 , f/u cath no lesion >40%    Nephrolithiasis     right side    OA (osteoarthritis) of knee     Obesity     Rheumatoid arthritis flare (H)     prednisone    Sleep apnea     restarted using cpap 2017    TIA (transient ischaemic attack)     Ventral hernia, unspecified, without mention of obstruction or gangrene        Past Surgical History   Past Surgical History:   Procedure Laterality Date    APPENDECTOMY      BIOPSY BREAST      x2 -needle & lumpectomy-benign    CHOLECYSTECTOMY      CORONARY ANGIOGRAPHY ADULT ORDER   9/28/2007    Bare metal stent to OM1, Diagonal patent     CORONARY ANGIOGRAPHY ADULT ORDER  9/25/2007    [place] stent to Diagonal    EP ABLATION AV NODE N/A 7/12/2024    Procedure: Ablation Atrioventricular Node;  Surgeon: Davion Baron MD;  Location:  HEART CARDI[facility] CATH LAB    EP PACEMAKER DEVICE & LEAD IMPLANT- RIGHT VENTRICULAR N/A 7/12/2024    Procedure: Pacemaker Device & Lead Implant- Right ventricular;  Surgeon: Davion Baron MD;  Location:  HEART CARDIAC CATH LAB    HC LEFT HEART CATHETERIZATION  8/2013    Moderate CAD    HYSTERECTOMY TOTAL ABDOMINAL      ORTHOPEDIC SURGERY      knee replacement on right side (2006), Left side (2016)    RELEASE CARPAL TUNNEL      right and left    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 Taking?   Cholecalciferol (VITAMIN D) 125 MCG (5000 UT) capsule  Self Yes No   Sig: Take 1 tablet by mouth daily   DULoxetine (CYMBALTA) 60 MG capsule  Self No No   Sig: Take 1 capsule (60 mg) by mouth daily   Menthol, Topical Analgesic, (ICY HOT MEDICATED SPRAY EX)  Self Yes No   Sig: Externally apply topically daily as needed (pain, in neck, back, hand)   Multiple Vitamins-Minerals (HAIR SKIN AND NAILS FORMULA PO)  Self Yes No   Sig: Take 1 tablet by mouth daily   acetaminophen (TYLENOL) 500 MG tablet  Self Yes No   Sig: Take 1,000 mg by mouth 2 times daily   albuterol (PROAIR HFA/PROVENTIL HFA/VENTOLIN HFA) 108 (90 Base) MCG/ACT inhaler  Self Yes No   Sig: Inhale 2 puffs into the lungs every 6 hours as needed for shortness of breath, wheezing or cough For shortness of breath   apixaban ANTICOAGULANT (ELIQUIS) 2.5 MG tablet  Self No No   Sig: Take 1 tablet (2.5 mg) by mouth 2 times daily   atorvastatin (LIPITOR) 20 MG tablet  Self No No   Sig: Take 1 tablet (20 mg) by mouth daily for cholesterol   blood glucose (ACCU-CHEK GUIDE) test strip   No No    Sig: Use to test blood sugar once daily or as directed.   blood glucose (ACCU-CHEK SOFTCLIX) lancing device   No No   Sig: Lancing device to be used with lancets.   blood glucose monitoring (SOFTCLIX) lancets   No No   Sig: Use to test blood sugar once daily.   buPROPion (WELLBUTRIN SR) 100 MG 12 hr tablet  Self No No   Sig: Take 1 tablet (100 mg) by mouth daily for mood   calcium carbonate (TUMS) 500 MG chewable tablet  Self Yes No   Sig: Take 1 chew tab by mouth 2 times daily as needed for heartburn   cephALEXin (KEFLEX) 500 MG capsule   No No   Sig: Take 1 capsule (500 mg) by mouth 2 times daily   cephALEXin (KEFLEX) 500 MG capsule   No No   Sig: Take 1 capsule (500 mg) by mouth 2 times daily for 7 days   cyclobenzaprine (FLEXERIL) 5 MG tablet   No No   Sig: Take 1 tablet (5 mg) by mouth 3 times daily as needed for muscle spasms   fexofenadine (ALLEGRA) 60 MG tablet  Self Yes No   Sig: Take 60 mg by mouth daily   fluticasone-salmeterol (WIXELA INHUB) 100-50 MCG/ACT inhaler  Self No No   Sig: USE 1 INHALATION BY MOUTH EVERY  12 HOURS   Patient taking differently: Inhale 1 puff into the lungs daily USE 1 INHALATION BY MOUTH EVERY  12 HOURS   glimepiride (AMARYL) 2 MG tablet  Self No No   Sig: Take 1 tablet (2 mg) by mouth 2 times daily (before meals)   metoprolol tartrate (LOPRESSOR) 25 MG tablet  Self No No   Sig: Take 3 tablets (75 mg) by mouth 2 times daily   miconazole (MICATIN) 2 % external cream   Yes No   Sig: Apply topically 2 times daily as needed for other (rash/irritation)   nitroGLYcerin (NITROSTAT) 0.4 MG sublingual tablet   No No   Sig: FOR CHEST PAIN PLACE 1 TABLET UNDER THE TONGUE EVERY 5 MINUTES FOR 3 DOSES. IF SYMPTOMS PERSIST 5 MINUTES AFTER 1ST DOSE CALL 911   oxyCODONE (ROXICODONE) 5 MG tablet   No No   Sig: Take 1 tablet (5 mg) by mouth every 6 hours as needed for pain   pantoprazole (PROTONIX) 20 MG EC tablet  Self No No   Sig: Take 1 tablet (20 mg) by mouth daily   torsemide (DEMADEX) 20  MG tablet  Self No No   Sig: Take 2 tablets (40 mg) by mouth daily   Patient taking differently: Take 20 mg by mouth 2 times daily   triamcinolone (KENALOG) 0.1 % external cream   Yes No   Sig: Apply topically 2 times daily as needed for irritation      Facility-Administered Medications: None          Physical Exam   Vital Signs: Temp: 98.1  F (36.7  C) Temp src: Oral BP: 139/66 Pulse: 69   Resp: 20 SpO2: 93 % O2 Device: Nasal cannula Oxygen Delivery: 2 LPM (baseline O2 usage)  Weight: 252 lbs 0 oz    GEN: NAD, AXOX3  HEENT:  left facial and orbital ecchymosis  PULM: CTA  CV; RRR +M  GI: Abd soft, NT, obese,   MS: trace edema, LE wounds, with mild erythema  NEURO: CN intact, moves all 4 ext        Medical Decision Making       70 MINUTES SPENT BY ME on the date of service doing chart review, history, exam, documentation & further activities per the note.      Data     I have personally reviewed the following data over the past 24 hrs:    10.1  \   12.8   / 239     138 99 38.4 (H) /  185 (H)   4.6 26 2.27 (H) \       Imaging results reviewed over the past 24 hrs:   Recent Results (from the past 24 hour(s))   XR Shoulder Right G/E 3 Views    Narrative    EXAM: XR SHOULDER RIGHT G/E 3 VIEWS  LOCATION: [facility]  DATE: 8/3/2024    INDICATION: fall, shoulder pain  COMPARISON: None.      Impression    IMPRESSION: Somewhat limited evaluation due to external artifact, osteopenia, and body habitus. No acute fracture or malalignment. Moderate glenohumeral and acromioclavicular joint degenerative changes. Cortical irregularity at the greater tuberosity   suggests chronic rotator cuff pathology. Central venous catheter.   XR Pelvis w Hip Right 1 View    Narrative    EXAM: XR PELVIS AND HIP RIGHT 1 VIEW  LOCATION: [facility]  DATE: 8/3/2024    INDICATION: hip pain, fall  COMPARISON: None.      Impression    IMPRESSION: Somewhat limited evaluation due to body habitus,  positioning, and osteopenia. No acute fracture is identified. There is normal joint alignment. Moderate degenerative changes throughout the pelvis. Severe degenerative changes of the lower   lumbar spine. Partially imaged hardware in the right femoral diaphysis. Atherosclerosis.   CT Cervical Spine w/o Contrast    Narrative    EXAM: CT CERVICAL SPINE W/O CONTRAST  LOCATION: [facility]  DATE: 8/3/2024    INDICATION: fall, neck pain  COMPARISON: 04/15/2019 cervical spine CT.  TECHNIQUE: Routine CT Cervical Spine without IV contrast. Multiplanar reformats. Dose reduction techniques were used.    FINDINGS:  VERTEBRA: No acute cervical spine fracture. Rightward curvature to the cervical spine. Kyphosis spanning the cervical spine. 2 mm anterolisthesis of C3 on C4 and C4 on C5. 2 mm anterolisthesis of C7 on T1. Severe atlantodental degenerative change. Mild   to moderate C4-C5 degenerative disc disease. Moderate to severe C5-C6 and severe C6-C7 degenerative disc disease. Moderate right C3-C4 facet arthropathy with moderate to severe left C2-C3 through C4-C5 facet arthropathy. Multilevel mild bilateral facet   arthropathy elsewhere.    CANAL/FORAMINA: Multilevel mild spinal canal stenosis with multilevel mild bilateral neural foraminal stenosis.    PARASPINAL: Pacemaker leads partially visualized.      Impression    IMPRESSION:  1.  No fracture or posttraumatic subluxation.  2.  Osteopenia with degenerative change as above. No high-grade spinal canal or neural foraminal stenosis.

## 2024-08-03 NOTE — ED PROVIDER NOTES
Emergency Department Note      History of Present Illness     Chief Complaint   right shoulder/arm/hip pain    HPI   Moira Andre is a 89 year old female with history of CHF, CKD, and hypertension who presents with her daughter in law via EMS for neck and right-sided pain. Patient reports that four days ago, she was getting out of bed and walked a few steps when she fell, landing on the heating register. She was able to get up on her own afterwards. Kristyn was seen in the ED twice for this fall earlier this week. She has not fallen again since then. Currently, patient has pain in her neck, right shoulder, right hip, right groin, and right leg, and daughter in law says that these symtoms were present prior to the fall. She currently has a UTI and daughter in law is concerned that she has not been taking her antibiotic. Last pain medication was taken around 0-11am this morning. Kristyn lives at home alone and has a home health nurse visit daily though they do not help with her medications. Daughter in law thinks that the patient's mentation has been declining and living at home alone may no longer be safe.     Independent Historian   Daughter in law as detailed above.    Review of External Notes   None    Past Medical History     Medical History and Problem List   Aortic valve sclerosis  Arrhythmia  Aspirin allergy  Asthma  CKD   Congestive heart failure, unspecified  Depression  Diabetes mellitus   Diastolic dysfunction, left ventricle  Hypertension   Lactic acidosis  Migraine headache  Mitral stenosis  Myocardial infarction   Nephrolithiasis  osteoarthritis of knee   Obesity  Rheumatoid arthritis flare   Sleep apnea  TIA   Ventral hernia, unspecified, without mention of obstruction or gangrene     Medications   Albuterol    Apixaban    Atorvastatin    Bupropion      cephalexin   Cholecalciferol   Duloxetine   Hydralazine   fluticasone-salmeterol   glimepiride   Losartan   metoprolol  "tartrate  nitroglycerin  pantoprazole   torsemide      Surgical History   Appendectomy  Biopsy breast  Cholecystectomy   Coronary angiography x2  Ablation av node  Pacemaker  Left heart catheterization   Hysterectomy total abdominal  Knee replacement, right and left  Release carpal tunnel, bilateral    Physical Exam     Patient Vitals for the past 24 hrs:   BP Temp Temp src Pulse Resp SpO2 Height Weight   08/03/24 1530 (!) 148/66 -- -- 70 20 97 % -- --   08/03/24 1500 139/66 -- -- 69 20 93 % -- --   08/03/24 1430 125/52 -- -- 70 -- 93 % -- --   08/03/24 1418 -- 98.1  F (36.7  C) Oral -- -- -- -- --   08/03/24 1405 (!) 142/68 -- -- 72 20 94 % -- --   08/03/24 1300 (!) 145/71 -- -- 70 22 95 % -- --   08/03/24 1243 (!) 142/60 -- -- -- 22 93 % 1.702 m (5' 7\") 114.3 kg (252 lb)     Physical Exam  Constitutional: Sleeping but wakes with noxious stimulation, intermittently tearful.  HENT:    Nose: Nose normal.   Head: Resolving bruising around right eye and right forehead  Eyes: EOM are normal, anicteric, conjugate gaze  CV: regular rate and rhythm   Chest: Effort normal and breath sounds clear without wheezing or rales, symmetric bilaterally   GI:  non tender. No distension. No guarding or rebound.    MSK: No LE edema, no tenderness to palpation of BLE.  No focal tenderness upper or lower extremities, pelvis is stable.  Neurological: Alert, attentive, moving all extremities equally.   Skin: Skin is warm and dry.     Diagnostics     Lab Results   Labs Ordered and Resulted from Time of ED Arrival to Time of ED Departure   ROUTINE UA WITH MICROSCOPIC - Abnormal       Result Value    Color Urine Yellow      Appearance Urine Slightly Cloudy (*)     Glucose Urine Negative      Bilirubin Urine Negative      Ketones Urine Negative      Specific Gravity Urine 1.019      Blood Urine Moderate (*)     pH Urine 5.5      Protein Albumin Urine 50 (*)     Urobilinogen Urine Normal      Nitrite Urine Negative      Leukocyte Esterase Urine " Large (*)     Bacteria Urine Few (*)     RBC Urine 61 (*)     WBC Urine 61 (*)    BASIC METABOLIC PANEL - Abnormal    Sodium 138      Potassium 4.6      Chloride 99      Carbon Dioxide (CO2) 26      Anion Gap 13      Urea Nitrogen 38.4 (*)     Creatinine 2.27 (*)     GFR Estimate 20 (*)     Calcium 9.7      Glucose 185 (*)    CBC WITH PLATELETS AND DIFFERENTIAL - Abnormal    WBC Count 10.1      RBC Count 4.81      Hemoglobin 12.8      Hematocrit 42.0      MCV 87      MCH 26.6      MCHC 30.5 (*)     RDW 16.6 (*)     Platelet Count 239      % Neutrophils 78      % Lymphocytes 12      % Monocytes 8      % Eosinophils 1      % Basophils 0      % Immature Granulocytes 1      NRBCs per 100 WBC 0      Absolute Neutrophils 7.9      Absolute Lymphocytes 1.2      Absolute Monocytes 0.8      Absolute Eosinophils 0.1      Absolute Basophils 0.0      Absolute Immature Granulocytes 0.1      Absolute NRBCs 0.0         Imaging   CT Cervical Spine w/o Contrast   Final Result   IMPRESSION:   1.  No fracture or posttraumatic subluxation.   2.  Osteopenia with degenerative change as above. No high-grade spinal canal or neural foraminal stenosis.      XR Pelvis w Hip Right 1 View   Final Result   IMPRESSION: Somewhat limited evaluation due to body habitus, positioning, and osteopenia. No acute fracture is identified. There is normal joint alignment. Moderate degenerative changes throughout the pelvis. Severe degenerative changes of the lower    lumbar spine. Partially imaged hardware in the right femoral diaphysis. Atherosclerosis.      XR Shoulder Right G/E 3 Views   Final Result   IMPRESSION: Somewhat limited evaluation due to external artifact, osteopenia, and body habitus. No acute fracture or malalignment. Moderate glenohumeral and acromioclavicular joint degenerative changes. Cortical irregularity at the greater tuberosity    suggests chronic rotator cuff pathology. Central venous catheter.        EKG   None     Independent  Interpretation   None    ED Course      Medications Administered   Medications   atorvastatin (LIPITOR) tablet 20 mg (has no administration in time range)   buPROPion (WELLBUTRIN SR) 12 hr tablet 100 mg (has no administration in time range)   pantoprazole (PROTONIX) EC tablet 20 mg (has no administration in time range)   torsemide (DEMADEX) tablet 20 mg (has no administration in time range)   acetaminophen (TYLENOL) tablet 650 mg (650 mg Oral $Given 8/3/24 1306)   cefTRIAXone (ROCEPHIN) 2 g vial to attach to  ml bag for ADULTS or NS 50 ml bag for PEDS (0 g Intravenous Stopped 8/3/24 1547)       Procedures   None      Discussion of Management   Dr. Grimaldo    ED Course   ED Course as of 08/03/24 1605   Sat Aug 03, 2024   1248 I evaluated the patient, obtained history, and performed a physical exam as detailed above.      Additional Documentation  None    Medical Decision Making / Diagnosis     CMS Diagnoses: None    MIPS   None    MDM   Moira Andre is a 89 year old female presenting from home for right shoulder, neck and hip pain, in the setting of fall earlier this week where she was seen in the emergency department had negative head CT, chest x-ray and femur x-ray.  She does not think she had a new fall though does live independently.  Family noticed when they dropped her off the other day, she stayed in her chair and did not move since the pickup she was also started on antibiotics for UTI that they do not think she has taken.  CT imaging of her neck, shoulder and femur are negative.  Repeat labs are unremarkable except for urine which does appear consistent with UTI.  Family is concerned about her going home where she lives independently due to her recurrent falls and generalized weakness, as such we will plan for medicine admission for continued pain control and she does appear drowsy but is asking for pain medications as well as treatment for UTI placement.    Disposition   The patient was admitted to  the hospital.     Diagnosis     ICD-10-CM    1. Acute cystitis with hematuria  N30.01       2. Recurrent falls  R29.6       3. Contusion of face, initial encounter  S00.83XA            Gerson Saeed MD  [employer]  4:05 PM 08/03/24       Scribe Disclosure:  I, Meghna Almodovarusen, am serving as a scribe at 12:36 PM on 8/3/2024 to document services personally performed by Gerson Saeed MD based on my observations and the provider's statements to me.      Gerson Saeed MD  08/03/24 1609

## 2024-08-03 NOTE — PROGRESS NOTES
RECEIVING UNIT ED HANDOFF REVIEW    ED Nurse Handoff Report was reviewed by: Sarah Jauregui RN on August 3, 2024 at 6:42 PM

## 2024-08-03 NOTE — ED TRIAGE NOTES
Pt BIB EMS, with worsening R hip/arm/shoulder pain (from fall earlier this week on Monday or Tuesday) and new L shoulder pain. Pt lives independently and was unable to get out of her chair due to the pain, despite having oxycodone and muscle relaxer this AM. Pt required heavy assistance from first responders to get out of the chair. Prior to fall earlier this week, pt's was able to ambulate with a RW. Of note, pt has a bruise on head from fall earlier this week. Pt is alert and oriented.    EMS administered 100 mcg fentanyl, for 10/10 pain, with some relief noted. Pt is on 2L O2 via NC, SpO2 93% (baseline O2 usage, hx COPD). .    Pt reportedly has a daughter who has been notified of pt's condition, but does not live locally. Pt reports her independence has been declining over the last 8 months.

## 2024-08-04 ENCOUNTER — APPOINTMENT (OUTPATIENT)
Dept: PHYSICAL THERAPY | Facility: CLINIC | Age: 89
DRG: 690 | End: 2024-08-04
Attending: INTERNAL MEDICINE
Payer: COMMERCIAL

## 2024-08-04 LAB
ANION GAP SERPL CALCULATED.3IONS-SCNC: 12 MMOL/L (ref 7–15)
BACTERIA UR CULT: ABNORMAL
BUN SERPL-MCNC: 35.4 MG/DL (ref 8–23)
CALCIUM SERPL-MCNC: 8.9 MG/DL (ref 8.8–10.4)
CHLORIDE SERPL-SCNC: 102 MMOL/L (ref 98–107)
CREAT SERPL-MCNC: 2.12 MG/DL (ref 0.51–0.95)
EGFRCR SERPLBLD CKD-EPI 2021: 22 ML/MIN/1.73M2
GLUCOSE BLDC GLUCOMTR-MCNC: 119 MG/DL (ref 70–99)
GLUCOSE BLDC GLUCOMTR-MCNC: 132 MG/DL (ref 70–99)
GLUCOSE BLDC GLUCOMTR-MCNC: 75 MG/DL (ref 70–99)
GLUCOSE SERPL-MCNC: 142 MG/DL (ref 70–99)
HCO3 SERPL-SCNC: 25 MMOL/L (ref 22–29)
LACTATE SERPL-SCNC: 1.1 MMOL/L (ref 0.7–2)
LACTATE SERPL-SCNC: 2.3 MMOL/L (ref 0.7–2)
POTASSIUM SERPL-SCNC: 4.3 MMOL/L (ref 3.4–5.3)
SODIUM SERPL-SCNC: 139 MMOL/L (ref 135–145)

## 2024-08-04 PROCEDURE — 99232 SBSQ HOSP IP/OBS MODERATE 35: CPT | Performed by: HOSPITALIST

## 2024-08-04 PROCEDURE — 250N000013 HC RX MED GY IP 250 OP 250 PS 637: Performed by: HOSPITALIST

## 2024-08-04 PROCEDURE — 97530 THERAPEUTIC ACTIVITIES: CPT | Mod: GP

## 2024-08-04 PROCEDURE — 83605 ASSAY OF LACTIC ACID: CPT | Performed by: HOSPITALIST

## 2024-08-04 PROCEDURE — 97161 PT EVAL LOW COMPLEX 20 MIN: CPT | Mod: GP

## 2024-08-04 PROCEDURE — 250N000013 HC RX MED GY IP 250 OP 250 PS 637: Performed by: INTERNAL MEDICINE

## 2024-08-04 PROCEDURE — 250N000011 HC RX IP 250 OP 636: Performed by: INTERNAL MEDICINE

## 2024-08-04 PROCEDURE — 36415 COLL VENOUS BLD VENIPUNCTURE: CPT | Performed by: HOSPITALIST

## 2024-08-04 PROCEDURE — 36415 COLL VENOUS BLD VENIPUNCTURE: CPT | Performed by: INTERNAL MEDICINE

## 2024-08-04 PROCEDURE — 120N000001 HC R&B MED SURG/OB

## 2024-08-04 PROCEDURE — 80048 BASIC METABOLIC PNL TOTAL CA: CPT | Performed by: INTERNAL MEDICINE

## 2024-08-04 RX ORDER — NALOXONE HYDROCHLORIDE 0.4 MG/ML
0.2 INJECTION, SOLUTION INTRAMUSCULAR; INTRAVENOUS; SUBCUTANEOUS
Status: DISCONTINUED | OUTPATIENT
Start: 2024-08-04 | End: 2024-08-07 | Stop reason: HOSPADM

## 2024-08-04 RX ORDER — NALOXONE HYDROCHLORIDE 0.4 MG/ML
0.4 INJECTION, SOLUTION INTRAMUSCULAR; INTRAVENOUS; SUBCUTANEOUS
Status: DISCONTINUED | OUTPATIENT
Start: 2024-08-04 | End: 2024-08-07 | Stop reason: HOSPADM

## 2024-08-04 RX ORDER — OXYCODONE HYDROCHLORIDE 5 MG/1
5 TABLET ORAL EVERY 6 HOURS PRN
Status: DISCONTINUED | OUTPATIENT
Start: 2024-08-04 | End: 2024-08-07 | Stop reason: HOSPADM

## 2024-08-04 RX ORDER — NICOTINE POLACRILEX 4 MG
15-30 LOZENGE BUCCAL
Status: DISCONTINUED | OUTPATIENT
Start: 2024-08-04 | End: 2024-08-07 | Stop reason: HOSPADM

## 2024-08-04 RX ORDER — CYCLOBENZAPRINE HCL 5 MG
5 TABLET ORAL 3 TIMES DAILY PRN
Status: DISCONTINUED | OUTPATIENT
Start: 2024-08-04 | End: 2024-08-07 | Stop reason: HOSPADM

## 2024-08-04 RX ORDER — DEXTROSE MONOHYDRATE 25 G/50ML
25-50 INJECTION, SOLUTION INTRAVENOUS
Status: DISCONTINUED | OUTPATIENT
Start: 2024-08-04 | End: 2024-08-07 | Stop reason: HOSPADM

## 2024-08-04 RX ADMIN — MICONAZOLE NITRATE: 2 POWDER TOPICAL at 19:25

## 2024-08-04 RX ADMIN — ACETAMINOPHEN 975 MG: 325 TABLET, FILM COATED ORAL at 19:26

## 2024-08-04 RX ADMIN — BUPROPION HYDROCHLORIDE 100 MG: 100 TABLET, FILM COATED, EXTENDED RELEASE ORAL at 19:30

## 2024-08-04 RX ADMIN — APIXABAN 2.5 MG: 2.5 TABLET, FILM COATED ORAL at 19:25

## 2024-08-04 RX ADMIN — METOPROLOL TARTRATE 75 MG: 50 TABLET, FILM COATED ORAL at 19:26

## 2024-08-04 RX ADMIN — CEFTRIAXONE SODIUM 1 G: 1 INJECTION, POWDER, FOR SOLUTION INTRAMUSCULAR; INTRAVENOUS at 14:27

## 2024-08-04 RX ADMIN — OXYCODONE HYDROCHLORIDE 5 MG: 5 TABLET ORAL at 06:16

## 2024-08-04 RX ADMIN — ACETAMINOPHEN 975 MG: 325 TABLET, FILM COATED ORAL at 09:10

## 2024-08-04 RX ADMIN — OXYCODONE HYDROCHLORIDE 5 MG: 5 TABLET ORAL at 11:47

## 2024-08-04 RX ADMIN — TORSEMIDE 20 MG: 20 TABLET ORAL at 09:10

## 2024-08-04 RX ADMIN — METOPROLOL TARTRATE 75 MG: 50 TABLET, FILM COATED ORAL at 09:10

## 2024-08-04 RX ADMIN — OXYCODONE HYDROCHLORIDE 5 MG: 5 TABLET ORAL at 00:14

## 2024-08-04 RX ADMIN — CYCLOBENZAPRINE 5 MG: 5 TABLET, FILM COATED ORAL at 18:19

## 2024-08-04 RX ADMIN — APIXABAN 2.5 MG: 2.5 TABLET, FILM COATED ORAL at 09:10

## 2024-08-04 RX ADMIN — DULOXETINE HYDROCHLORIDE 60 MG: 60 CAPSULE, DELAYED RELEASE ORAL at 19:25

## 2024-08-04 RX ADMIN — ACETAMINOPHEN 975 MG: 325 TABLET, FILM COATED ORAL at 14:25

## 2024-08-04 RX ADMIN — ATORVASTATIN CALCIUM 20 MG: 20 TABLET, FILM COATED ORAL at 09:10

## 2024-08-04 RX ADMIN — PANTOPRAZOLE SODIUM 20 MG: 20 TABLET, DELAYED RELEASE ORAL at 09:10

## 2024-08-04 RX ADMIN — CYCLOBENZAPRINE 5 MG: 5 TABLET, FILM COATED ORAL at 00:14

## 2024-08-04 ASSESSMENT — ACTIVITIES OF DAILY LIVING (ADL)
ADLS_ACUITY_SCORE: 45
ADLS_ACUITY_SCORE: 47
ADLS_ACUITY_SCORE: 45
ADLS_ACUITY_SCORE: 47
ADLS_ACUITY_SCORE: 45
ADLS_ACUITY_SCORE: 45
ADLS_ACUITY_SCORE: 47
ADLS_ACUITY_SCORE: 45
ADLS_ACUITY_SCORE: 47
ADLS_ACUITY_SCORE: 45
ADLS_ACUITY_SCORE: 47
ADLS_ACUITY_SCORE: 45
ADLS_ACUITY_SCORE: 45
ADLS_ACUITY_SCORE: 47

## 2024-08-04 NOTE — PROGRESS NOTES
"   08/04/24 0938   Appointment Info   Signing Clinician's Name / Credentials (PT) Kevan Mcclendon, PT, DPT       Present no   Living Environment   People in Home alone   Current Living Arrangements apartment   Home Accessibility stairs to enter home   Number of Stairs, Main Entrance 1   Stair Railings, Main Entrance none   Transportation Anticipated family or friend will provide   Living Environment Comments Pt lives alone in an apartment with 1 curb to enter from the parking lot.   Self-Care   Usual Activity Tolerance moderate   Current Activity Tolerance poor   Regular Exercise No   Equipment Currently Used at Home walker, rolling   Fall history within last six months yes   Number of times patient has fallen within last six months   (patient noting 2 falls this week, 4 falls reported at time of 7/12 PT evaluation)   Activity/Exercise/Self-Care Comment patient reporting mod I at baseline with 4WW. has a cleaning person who comes every other week. Pt was having home care services that assisted with some ADLs such as showering.   General Information   Onset of Illness/Injury or Date of Surgery 08/03/24   Referring Physician Sy Grimaldo MD   Patient/Family Therapy Goals Statement (PT) decrease pain   Pertinent History of Current Problem (include personal factors and/or comorbidities that impact the POC) Per chart review, \"Moira Andre is a 89 year old female with history of CHF, CKD, and hypertension who presents with her daughter in law via EMS for neck and right-sided pain. Patient reports that four days ago, she was getting out of bed and walked a few steps when she fell, landing on the heating register. She was able to get up on her own afterwards. Kristyn was seen in the ED twice for this fall earlier this week. She has not fallen again since then. Currently, patient has pain in her neck, right shoulder, right hip, right groin, and right leg, and daughter in law says that these symtoms " "were present prior to the fall. She currently has a UTI and daughter in law is concerned that she has not been taking her antibiotic. Last pain medication was taken around 0-11am this morning. Kristyn lives at home alone and has a home health nurse visit daily though they do not help with her medications. Daughter in law thinks that the patient's mentation has been declining and living at home alone may no longer be safe.\"   Existing Precautions/Restrictions fall   Cognition   Affect/Mental Status (Cognition) WFL   Orientation Status (Cognition) oriented x 4   Follows Commands (Cognition) WFL   Cognitive Status Comments patient intermittently tearful during session 2/2 pain   Integumentary/Edema   Integumentary/Edema Comments mild erythema on RLE   Posture    Posture Forward head position;Protracted shoulders   Range of Motion (ROM)   Range of Motion ROM deficits secondary to weakness   ROM Comment LE AROM limited especially on RLE 2/2 pain and weakness   Strength (Manual Muscle Testing)   Strength (Manual Muscle Testing) Deficits observed during functional mobility   Strength Comments moderate functional strength deficits   Bed Mobility   Comment, (Bed Mobility) min A supine>sit   Transfers   Comment, (Transfers) min A sit>stand w/ FWW   Gait/Stairs (Locomotion)   Comment, (Gait/Stairs) pt unable to ambulate at this time 2/2 pain and weakness   Balance   Balance Comments mild dynamic balance deficits   Sensory Examination   Sensory Perception patient reports no sensory changes   Clinical Impression   Criteria for Skilled Therapeutic Intervention Yes, treatment indicated   PT Diagnosis (PT) impaired functional mobility   Influenced by the following impairments impaired strength, balance, activity tolerance, pain levels in RLE   Functional limitations due to impairments limited IND with mobility   Clinical Presentation (PT Evaluation Complexity) stable   Clinical Presentation Rationale clinical judgement, no fractures " on imaging   Clinical Decision Making (Complexity) low complexity   Planned Therapy Interventions (PT) balance training;bed mobility training;gait training;home exercise program;neuromuscular re-education;patient/family education;stair training;strengthening;ROM (range of motion);transfer training;progressive activity/exercise;home program guidelines   Risk & Benefits of therapy have been explained evaluation/treatment results reviewed;care plan/treatment goals reviewed;risks/benefits reviewed;current/potential barriers reviewed;participants voiced agreement with care plan;participants included;patient   PT Total Evaluation Time   PT Eval, Low Complexity Minutes (50397) 8   Physical Therapy Goals   PT Frequency 5x/week   PT Predicted Duration/Target Date for Goal Attainment 08/14/24   PT Goals Bed Mobility;Transfers;Gait;Stairs   PT: Bed Mobility Independent;Supine to/from sit   PT: Transfers Modified independent;Sit to/from stand;Bed to/from chair;Assistive device   PT: Gait Modified independent;Assistive device;150 feet   PT: Stairs Supervision/stand-by assist;1 stair;Assistive device   Interventions   Interventions Quick Adds Therapeutic Activity   Therapeutic Activity   Therapeutic Activities: dynamic activities to improve functional performance Minutes (10421) 26   Symptoms Noted During/After Treatment Fatigue;Increased pain   Treatment Detail/Skilled Intervention Greeted pt upon arrival to room. Pt agreeable to working with PT. Pt noting high pain levels in RLE with movement. Supine>sit w/ min A. Cueing and encouragement throughout for patient to complete as much of the transition under their own power as they can. While seated at edge of bed, patient noting large increase in R knee and hip pain. Sit>stand w/ FWW and min A. Cueing for safe and efficient sit<>stand procedure including scooting to the front edge of the chair, to lean forward with nose over toes, and hand and foot placement. Stand pivot transfer  bed>recliner w/ FWW and CGA. Cueing to keep FWW close to patient's body throughout. Noting incontinence with transfer. Stand>sit to recliner w/ FWW and CGA. Additional sit>stand w/ FWW and CGA with brief donned w/ mod A. Pt left with all needs met and call light within reach.   PT Discharge Planning   PT Plan initiate ambulation w/ 4WW and chair follow, transfers, activity tolerance, 1 step as able   PT Discharge Recommendation (DC Rec) Transitional Care Facility   PT Rationale for DC Rec Pt is below baseline. Pt currently requiring assist with all functional mobility. Pt presents with deficits in activity tolerance, balance, and strength with RLE pain being the main limiter at this time. Patient is unable to ambulate secondary to pain in R hip and knee. Due to these deficits, pt is a high falls risk and unsafe to return home at this time, as patient lives alone. Pt would benefit from continued skilled PT services via TCU to address deficits and improve IND with safety and functional mobility in order to return to [facility].   PT Brief overview of current status A x1 w/ FWW stand pivot transfer   Total Session Time   Timed Code Treatment Minutes 26   Total Session Time (sum of timed and untimed services) 34

## 2024-08-04 NOTE — PROVIDER NOTIFICATION
MD Notification    Notified Person: MD    Notified Person Name: Alyse Callahan MD    Notification Date/Time: 8/4/24 12:07 AM    Notification Interaction: vocera page    Purpose of Notification: Pt restless and tearful and reporting severe pain, no PRNs currently ordered, takes both oxy and flexeril at home. Please order     Orders Received: oxy 5mg prn and flexeril tid prn    Comments:

## 2024-08-04 NOTE — CONSULTS
[facility]    Orthopedic Consultation    Moira Andre MRN# [id number]   Age: 89 year old YOB: 1933     Date of Admission:  8/3/2024    Reason for consult: Right knee pain with reduced range of motion and history of multple surgeries and infection of the knee.       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  Patient with minimal, controlled right knee and hip pain in the context of a recent fall and increased pain after recently moving furniture at her home.  No recent fevers, chills, or illnesses other than a UTI for which she is on antibiotics.  Normal WBC today of 10.1.  Examination today is reassuring and there is a low clinical suspicion for infection at this time.  Given patient's history we will continue to follow her daily to determine if any further intervention or evaluation is needed.       Plan:   Continue with conservative management.  Pain control per medicine team.  No clinical indication for aspiration or further imaging at this time.  Will continue to follow patient.            Chief Complaint:   Right knee pain         History of Present Illness:   Moira Andre is a 89 year old female with history of CHF, CKD, and hypertension who presented yesterday with her daughter in law via EMS for neck and right-sided pain. Patient reported that four days prior, she was getting out of bed and walked a few steps when she fell, landing on the heating register. She was able to get up on her own afterwards.     Kristyn was seen in the ED twice for this fall earlier this week. She has not fallen again since then. The patient reported pain in her neck, right shoulder, right hip, right groin, and right leg, and daughter in law says that these  "symtoms were present prior to the fall. Patient with a known UTI and daughter was concerned she was not taking her antibiotic appropriately.    The patient lives at home alone and has a home health nurse visit daily, though they do not assist with medications. Daughter noted to the ED provider that her mother's mentation has been declining and she was unsure if living alone is safe at this time.       The patient states today that a few days ago she was attempting to move some furniture at her home when she started having pain in her right hip and knee.  She reports a history of a previous right total knee arthroplasty approximately 20 years ago that was later complicated by an infection and ultimately needed a spacer and hardware replacement.  She currently denies any significant knee or hip pain and states \"it's just a little sore right now\".  She states it does not feel similar to prior infection she had.  No other orthopedic complaints.   She denies any recent fevers, chills, or illnesses and besides the falls has been feeling ok.      XR imaging of the pelvis and right hip from 8/3 shows no identifiable fracture and normal joint alignment. Moderate degenerative changes throughout the pelvis. Severe degenerative changes of the lower lumbar spine.     XR imaging of the right knee dated 7/30 shows long stem, constrained total knee arthroplasty hardware with cemented components. There is no joint effusion, fracture, or evidence of hardware loosening. Soft tissue edema is observed in the subcutaneous fat around the knee and proximal lower leg. Extensive atheromatous vascular calcification.       Past Medical History:     Past Medical History:   Diagnosis Date    Aortic valve sclerosis     heart murmur, no AS    Arrhythmia     PAT, PVC    Aspirin allergy     Plavix use long term    Asthma     CKD (chronic kidney disease) stage 3, GFR 30-59 ml/min (H)     x 2007 atleast    Congestive heart failure, unspecified     " Depression     Diabetes mellitus (H)     Diastolic dysfunction, left ventricle     grade 2, nl ef    HTN (hypertension)     Lactic acidosis 2018    due to dehydration and metformin    Migraine headache     Mitral stenosis     mild, 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    Myocardial infarction (H) 2007, cath 2013 ml    BMS: stent to OM, diag, nl EF, echo /C angia  , f/u cath no lesion >40%    Nephrolithiasis     right side    OA (osteoarthritis) of knee     Obesity     Rheumatoid arthritis flare (H)     prednisone    Sleep apnea     restarted using cpap     TIA (transient ischaemic attack)     Ventral hernia, unspecified, without mention of obstruction or gangrene              Past Surgical History:     Past Surgical History:   Procedure Laterality Date    APPENDECTOMY      BIOPSY BREAST      x2 -needle & lumpectomy-benign    CHOLECYSTECTOMY      CORONARY ANGIOGRAPHY ADULT ORDER  2007    Bare metal stent to OM1, Diagonal patent     CORONARY ANGIOGRAPHY ADULT ORDER  2007    Hardy stent to Diagonal    EP ABLATION AV NODE N/A 2024    Procedure: Ablation Atrioventricular Node;  Surgeon: Davion Baron MD;  Location:  [facility]    EP PACEMAKER DEVICE & LEAD IMPLANT- RIGHT VENTRICULAR N/A 2024    Procedure: Pacemaker Device & Lead Implant- Right ventricular;  Surgeon: Davion Baron MD;  Location:  [facility]    HC LEFT HEART CATHETERIZATION  2013    Moderate CAD    HYSTERECTOMY TOTAL ABDOMINAL      ORTHOPEDIC SURGERY      knee replacement on right side (), Left side ()    RELEASE CARPAL TUNNEL      right and left    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 Father          at 8o's, ? prostate ca    Breast Cancer No family hx of     Cancer - colorectal No family hx of              Immunizations:     VACCINE/DOSE   Diptheria   DPT   DTAP   HBIG   Hepatitis A   Hepatitis B   HIB   Influenza   Measles   Meningococcal   MMR   Mumps   Pneumococcal   Polio   Rubella   Small Pox   TDAP   Varicella   Zoster             Allergies:     Allergies   Allergen Reactions    Aspirin Hives     Reaction occurred during childhood.     Lidocaine Itching    Lisinopril Cough    Losartan      Hyperkalemia      Metformin      Elevated lactic acid    Minocycline      Yellow Dye Allergy. Minocycline has Yellow Dye #10.    Mounjaro [Tirzepatide] Diarrhea and GI Disturbance    Salicylates Hives    Yellow Dye Hives     Rxn to yellow tablet. Eyes swelled shut.     Yellow Dyes (Non-Tartrazine) Hives             Medications:     Current Facility-Administered Medications   Medication Dose Route Frequency Provider Last Rate Last Admin    acetaminophen (TYLENOL) tablet 975 mg  975 mg Oral TID Sy Grimaldo MD   975 mg at 24 1425    albuterol (PROVENTIL HFA/VENTOLIN HFA) inhaler  2 puff Inhalation Q6H PRN Sy Grimaldo MD        apixaban ANTICOAGULANT (ELIQUIS) tablet 2.5 mg  2.5 mg Oral BID Sy Grimaldo MD   2.5 mg at 24 0910    atorvastatin (LIPITOR) tablet 20 mg  20 mg Oral Daily Sy Grimaldo MD   20 mg at 24 0910    buPROPion (WELLBUTRIN SR) 12 hr tablet 100 mg  100 mg Oral QPM Sy Grimaldo MD   100 mg at 24 2219    cefTRIAXone (ROCEPHIN) 1 g vial to attach to  mL bag for ADULTS or NS 50 mL bag for PEDS  1 g Intravenous Q24H Sy Grimaldo   mL/hr at 08/04/24 1427 1 g at 08/04/24 1427    cyclobenzaprine (FLEXERIL) tablet 5 mg  5 mg Oral TID PRN Alyse Callahan MD   5 mg at 08/04/24 0014    glucose gel 15-30 g  15-30 g Oral Q15 Min PRN Dionna Estevez MD        Or    dextrose 50 % injection 25-50 mL  25-50 mL Intravenous Q15 Min PRN Dionna Estevez MD        Or    glucagon injection 1 mg  1 mg Subcutaneous Q15 Min PRN Dionna Estevez MD        DULoxetine (CYMBALTA) DR capsule 60 mg  60 mg Oral QPM Sy Grimaldo MD   60 mg at 08/03/24 2215    insulin aspart (NovoLOG) injection (RAPID ACTING)  1-7 Units Subcutaneous 4x Daily w/meals Dionna Estevez MD        metoprolol tartrate (LOPRESSOR) tablet 75 mg  75 mg Oral BID Sy Grimaldo MD   75 mg at 08/04/24 0910    miconazole (MICATIN) 2 % powder   Topical BID Dionna Estevez MD        naloxone (NARCAN) injection 0.2 mg  0.2 mg Intravenous Q2 Min PRN Sy Grimaldo MD        Or    naloxone (NARCAN) injection 0.4 mg  0.4 mg Intravenous Q2 Min PRN Sy Grimaldo MD        Or    naloxone (NARCAN) injection 0.2 mg  0.2 mg Intramuscular Q2 Min PRN Sy Grimaldo MD        Or    naloxone (NARCAN) injection 0.4 mg  0.4 mg Intramuscular Q2 Min PRN Sy Grimaldo MD        nitroGLYcerin (NITROSTAT) sublingual tablet 0.4 mg  0.4 mg Sublingual Q5 Min PRN Sy Grimaldo MD        ondansetron (ZOFRAN ODT) ODT tab 4 mg  4 mg Oral Q6H PRN Sy Grimaldo MD        Or    ondansetron (ZOFRAN) injection 4 mg  4 mg Intravenous Q6H PRN Sy Grimaldo MD        oxyCODONE (ROXICODONE) tablet 5 mg  5 mg Oral Q6H PRN Alyse Callahan MD   5 mg at 08/04/24 1147    pantoprazole (PROTONIX) EC tablet 20 mg  20 mg Oral Daily Sy Grimaldo MD   20 mg at 08/04/24 0910    Patient is already receiving anticoagulation with heparin, enoxaparin (LOVENOX), warfarin (COUMADIN)  or other anticoagulant medication   Does not apply Continuous PRN Sy Grimaldo MD         senna-docusate (SENOKOT-S/PERICOLACE) 8.6-50 MG per tablet 1 tablet  1 tablet Oral BID Sy Belcher MD        Or    senna-docusate (SENOKOT-S/PERICOLACE) 8.6-50 MG per tablet 2 tablet  2 tablet Oral BID Sy Belcher MD        [Held by provider] torsemide (DEMADEX) tablet 20 mg  20 mg Oral BID Sy Grimaldo MD   20 mg at 08/04/24 0910             Review of Systems:   ROS:  10 point ROS neg other than the symptoms noted above in the HPI.            Physical Exam:   All vitals have been reviewed  Patient Vitals for the past 24 hrs:   BP Temp Temp src Pulse Resp SpO2   08/04/24 1235 -- -- -- -- 16 --   08/04/24 1139 (!) 142/86 98.9  F (37.2  C) Oral 78 18 91 %   08/04/24 1137 131/63 98.2  F (36.8  C) Oral 98 18 91 %   08/04/24 0910 -- -- -- -- -- 91 %   08/04/24 0756 (!) 147/77 97.2  F (36.2  C) Oral 71 18 98 %   08/04/24 0110 134/74 97.6  F (36.4  C) Axillary 71 18 98 %   08/03/24 1914 122/78 99.1  F (37.3  C) -- 75 18 97 %   08/03/24 1800 (!) 145/73 -- -- 72 18 98 %   08/03/24 1700 (!) 157/78 -- -- 71 20 98 %   08/03/24 1600 (!) 154/69 98  F (36.7  C) Oral 69 18 95 %     Intake/Output Summary (Last 24 hours) at 8/4/2024 1540  Last data filed at 8/4/2024 0600  Gross per 24 hour   Intake --   Output 180 ml   Net -180 ml     Physical Exam   Temp: 98.9  F (37.2  C) Temp src: Oral BP: (!) 142/86 Pulse: 78   Resp: 16 SpO2: 91 % O2 Device: None (Room air) Oxygen Delivery: 2 LPM  Vital Signs with Ranges  Temp:  [97.2  F (36.2  C)-99.1  F (37.3  C)] 98.9  F (37.2  C)  Pulse:  [69-98] 78  Resp:  [16-20] 16  BP: (122-157)/(63-86) 142/86  SpO2:  [91 %-98 %] 91 %  252 lbs 0 oz    Constitutional: Pleasant, alert, appropriate, following commands.  HEENT: Diffuse bruising noted on right side of face and eye.    Respiratory: Unlabored breathing no audible wheeze  Cardiovascular: Regular rate and rhythm per pulses  GI: Obese abdomen.   Skin: No rashes, no cyanosis, no edema.  Musculoskeletal: On examination  of the right lower extremity, there is no obvious deformity.  Anatomy of the right knee is difficult to distinguish given complex surgical history.  Multiple large longitudinal chronic scars anteriorly.  No ecchymosis or erythema.  Knee joint is not warm to the touch and there is no focal tenderness.  Mild diffuse tenderness with palpation medially, laterally, and anteriorly.  No tenderness of popliteal fossa.  Upper and lower leg comparments are soft, compressible, and nontender.  Mild swelling of the right knee, which patient states is chronic.  Lower leg with mild pitting edema distally, near her ankle.  I am able to passively range her knee from 0-90 degrees with some discomfort, though pain significantly increases when I attempt to further flex the joint.  RLE is NVI.  Sensation is intact to light touch.  Palpable DP pulse.    Neurologic: normal without focal findings, mental status, speech normal         Data:   All laboratory data reviewed  Results for orders placed or performed during the hospital encounter of 08/03/24   CT Cervical Spine w/o Contrast     Status: None    Narrative    EXAM: CT CERVICAL SPINE W/O CONTRAST  LOCATION: [facility]  DATE: 8/3/2024    INDICATION: fall, neck pain  COMPARISON: 04/15/2019 cervical spine CT.  TECHNIQUE: Routine CT Cervical Spine without IV contrast. Multiplanar reformats. Dose reduction techniques were used.    FINDINGS:  VERTEBRA: No acute cervical spine fracture. Rightward curvature to the cervical spine. Kyphosis spanning the cervical spine. 2 mm anterolisthesis of C3 on C4 and C4 on C5. 2 mm anterolisthesis of C7 on T1. Severe atlantodental degenerative change. Mild   to moderate C4-C5 degenerative disc disease. Moderate to severe C5-C6 and severe C6-C7 degenerative disc disease. Moderate right C3-C4 facet arthropathy with moderate to severe left C2-C3 through C4-C5 facet arthropathy. Multilevel mild bilateral facet   arthropathy  elsewhere.    CANAL/FORAMINA: Multilevel mild spinal canal stenosis with multilevel mild bilateral neural foraminal stenosis.    PARASPINAL: Pacemaker leads partially visualized.      Impression    IMPRESSION:  1.  No fracture or posttraumatic subluxation.  2.  Osteopenia with degenerative change as above. No high-grade spinal canal or neural foraminal stenosis.   XR Shoulder Right G/E 3 Views     Status: None    Narrative    EXAM: XR SHOULDER RIGHT G/E 3 VIEWS  LOCATION: [facility]  DATE: 8/3/2024    INDICATION: fall, shoulder pain  COMPARISON: None.      Impression    IMPRESSION: Somewhat limited evaluation due to external artifact, osteopenia, and body habitus. No acute fracture or malalignment. Moderate glenohumeral and acromioclavicular joint degenerative changes. Cortical irregularity at the greater tuberosity   suggests chronic rotator cuff pathology. Central venous catheter.   XR Pelvis w Hip Right 1 View     Status: None    Narrative    EXAM: XR PELVIS AND HIP RIGHT 1 VIEW  LOCATION: [facility]  DATE: 8/3/2024    INDICATION: hip pain, fall  COMPARISON: None.      Impression    IMPRESSION: Somewhat limited evaluation due to body habitus, positioning, and osteopenia. No acute fracture is identified. There is normal joint alignment. Moderate degenerative changes throughout the pelvis. Severe degenerative changes of the lower   lumbar spine. Partially imaged hardware in the right femoral diaphysis. Atherosclerosis.   UA with Microscopic     Status: Abnormal   Result Value Ref Range    Color Urine Yellow Colorless, Straw, Light Yellow, Yellow    Appearance Urine Slightly Cloudy (A) Clear    Glucose Urine Negative Negative mg/dL    Bilirubin Urine Negative Negative    Ketones Urine Negative Negative mg/dL    Specific Gravity Urine 1.019 1.003 - 1.035    Blood Urine Moderate (A) Negative    pH Urine 5.5 5.0 - 7.0    Protein Albumin Urine 50 (A) Negative mg/dL     Urobilinogen Urine Normal Normal, 2.0 mg/dL    Nitrite Urine Negative Negative    Leukocyte Esterase Urine Large (A) Negative    Bacteria Urine Few (A) None Seen /HPF    RBC Urine 61 (H) <=2 /HPF    WBC Urine 61 (H) <=5 /HPF   Basic metabolic panel     Status: Abnormal   Result Value Ref Range    Sodium 138 135 - 145 mmol/L    Potassium 4.6 3.4 - 5.3 mmol/L    Chloride 99 98 - 107 mmol/L    Carbon Dioxide (CO2) 26 22 - 29 mmol/L    Anion Gap 13 7 - 15 mmol/L    Urea Nitrogen 38.4 (H) 8.0 - 23.0 mg/dL    Creatinine 2.27 (H) 0.51 - 0.95 mg/dL    GFR Estimate 20 (L) >60 mL/min/1.73m2    Calcium 9.7 8.8 - 10.4 mg/dL    Glucose 185 (H) 70 - 99 mg/dL   CBC with platelets and differential     Status: Abnormal   Result Value Ref Range    WBC Count 10.1 4.0 - 11.0 10e3/uL    RBC Count 4.81 3.80 - 5.20 10e6/uL    Hemoglobin 12.8 11.7 - 15.7 g/dL    Hematocrit 42.0 35.0 - 47.0 %    MCV 87 78 - 100 fL    MCH 26.6 26.5 - 33.0 pg    MCHC 30.5 (L) 31.5 - 36.5 g/dL    RDW 16.6 (H) 10.0 - 15.0 %    Platelet Count 239 150 - 450 10e3/uL    % Neutrophils 78 %    % Lymphocytes 12 %    % Monocytes 8 %    % Eosinophils 1 %    % Basophils 0 %    % Immature Granulocytes 1 %    NRBCs per 100 WBC 0 <1 /100    Absolute Neutrophils 7.9 1.6 - 8.3 10e3/uL    Absolute Lymphocytes 1.2 0.8 - 5.3 10e3/uL    Absolute Monocytes 0.8 0.0 - 1.3 10e3/uL    Absolute Eosinophils 0.1 0.0 - 0.7 10e3/uL    Absolute Basophils 0.0 0.0 - 0.2 10e3/uL    Absolute Immature Granulocytes 0.1 <=0.4 10e3/uL    Absolute NRBCs 0.0 10e3/uL   Basic metabolic panel     Status: Abnormal   Result Value Ref Range    Sodium 139 135 - 145 mmol/L    Potassium 4.3 3.4 - 5.3 mmol/L    Chloride 102 98 - 107 mmol/L    Carbon Dioxide (CO2) 25 22 - 29 mmol/L    Anion Gap 12 7 - 15 mmol/L    Urea Nitrogen 35.4 (H) 8.0 - 23.0 mg/dL    Creatinine 2.12 (H) 0.51 - 0.95 mg/dL    GFR Estimate 22 (L) >60 mL/min/1.73m2    Calcium 8.9 8.8 - 10.4 mg/dL    Glucose 142 (H) 70 - 99 mg/dL   Lactic acid  whole blood     Status: Abnormal   Result Value Ref Range    Lactic Acid 2.3 (H) 0.7 - 2.0 mmol/L   CBC with platelets + differential     Status: Abnormal    Narrative    The following orders were created for panel order CBC with platelets + differential.  Procedure                               Abnormality         Status                     ---------                               -----------         ------                     CBC with platelets and d...[[id number]]  Abnormal            Final result                 Please view results for these tests on the individual orders.     Attestation:  I have reviewed today's vital signs, notes, medications, labs and imaging with Dr. Booth.  Amount of time performed on this consult: 45 minutes.    Teri Hicks PA-C

## 2024-08-04 NOTE — PROGRESS NOTES
[facility]    Hospitalist Progress Note    Interval History   Patient is awake and alert this morning.  Answering questions appropriately.  Oriented to time place and person.  Endorsing significant right knee pain with restricted range of motion.  Has been having frequent falls lately and does not feel strong enough to live independently at this time.  Denies any chest pain or shortness of breath.    -Data reviewed today: I reviewed all new labs and imaging results over the last 24 hours. I personally reviewed the ct cervical spine  image(s) showing no fx . Degenerative changes  .    Physical Exam   Temp: 98.9  F (37.2  C) Temp src: Oral BP: (!) 142/86 Pulse: 78   Resp: 18 SpO2: 91 % O2 Device: None (Room air) Oxygen Delivery: 2 LPM  Vitals:    08/03/24 1243   Weight: 114.3 kg (252 lb)     Vital Signs with Ranges  Temp:  [97.2  F (36.2  C)-99.1  F (37.3  C)] 98.9  F (37.2  C)  Pulse:  [69-98] 78  Resp:  [18-22] 18  BP: (122-157)/(52-86) 142/86  SpO2:  [91 %-98 %] 91 %  I/O last 3 completed shifts:  In: -   Out: 180 [Urine:180]    Physical Exam  Constitutional:       Appearance: She is obese. She is ill-appearing.   Cardiovascular:      Rate and Rhythm: Normal rate and regular rhythm.      Pulses: Normal pulses.      Heart sounds: Murmur heard.   Pulmonary:      Effort: Pulmonary effort is normal. No respiratory distress.      Breath sounds: Normal breath sounds.   Abdominal:      General: Abdomen is flat. Bowel sounds are normal. There is no distension.      Tenderness: There is no abdominal tenderness. There is no guarding.   Musculoskeletal:      Comments: Right knee swollen with reduced range of motion . No erythema. Not warm to touch    Skin:     General: Skin is warm and dry.      Comments: Extensive bruising around he right eye and forehead . Groin erythema .    Neurological:      General: No focal deficit present.           Medications   Current Facility-Administered Medications    Medication Dose Route Frequency Provider Last Rate Last Admin    Patient is already receiving anticoagulation with heparin, enoxaparin (LOVENOX), warfarin (COUMADIN)  or other anticoagulant medication   Does not apply Continuous PRN Sy Grimaldo MD         Current Facility-Administered Medications   Medication Dose Route Frequency Provider Last Rate Last Admin    acetaminophen (TYLENOL) tablet 975 mg  975 mg Oral TID Sy Grimaldo MD   975 mg at 08/04/24 0910    apixaban ANTICOAGULANT (ELIQUIS) tablet 2.5 mg  2.5 mg Oral BID Sy Grimaldo MD   2.5 mg at 08/04/24 0910    atorvastatin (LIPITOR) tablet 20 mg  20 mg Oral Daily Sy Grimaldo MD   20 mg at 08/04/24 0910    buPROPion (WELLBUTRIN SR) 12 hr tablet 100 mg  100 mg Oral QPM Sy Grimaldo MD   100 mg at 08/03/24 2219    cefTRIAXone (ROCEPHIN) 1 g vial to attach to  mL bag for ADULTS or NS 50 mL bag for PEDS  1 g Intravenous Q24H Sy Grimaldo MD        DULoxetine (CYMBALTA) DR capsule 60 mg  60 mg Oral QPM Sy Grimaldo MD   60 mg at 08/03/24 2215    metoprolol tartrate (LOPRESSOR) tablet 75 mg  75 mg Oral BID Sy Grimaldo MD   75 mg at 08/04/24 0910    pantoprazole (PROTONIX) EC tablet 20 mg  20 mg Oral Daily Sy Grimaldo MD   20 mg at 08/04/24 0910    torsemide (DEMADEX) tablet 20 mg  20 mg Oral BID Sy Grimaldo MD   20 mg at 08/04/24 0910       Data   Recent Labs   Lab 08/04/24  0700 08/03/24  1327 08/01/24  1618   WBC  --  10.1 9.5   HGB  --  12.8 13.1   MCV  --  87 86   PLT  --  239 240    138 140   POTASSIUM 4.3 4.6 4.5   CHLORIDE 102 99 100   CO2 25 26 27   BUN 35.4* 38.4* 37.9*   CR 2.12* 2.27* 2.26*   ANIONGAP 12 13 13   TELLY 8.9 9.7 9.8   * 185* 130*   ALBUMIN  --   --  4.1   PROTTOTAL  --   --  7.4   BILITOTAL  --   --  0.8   ALKPHOS  --   --  75   ALT  --   --  7   AST  --   --  17       Recent Results (from the past 24 hour(s))   XR Shoulder Right G/E 3 Views     Narrative    EXAM: XR SHOULDER RIGHT G/E 3 VIEWS  LOCATION: [facility]  DATE: 8/3/2024    INDICATION: fall, shoulder pain  COMPARISON: None.      Impression    IMPRESSION: Somewhat limited evaluation due to external artifact, osteopenia, and body habitus. No acute fracture or malalignment. Moderate glenohumeral and acromioclavicular joint degenerative changes. Cortical irregularity at the greater tuberosity   suggests chronic rotator cuff pathology. Central venous catheter.   XR Pelvis w Hip Right 1 View    Narrative    EXAM: XR PELVIS AND HIP RIGHT 1 VIEW  LOCATION: [facility]  DATE: 8/3/2024    INDICATION: hip pain, fall  COMPARISON: None.      Impression    IMPRESSION: Somewhat limited evaluation due to body habitus, positioning, and osteopenia. No acute fracture is identified. There is normal joint alignment. Moderate degenerative changes throughout the pelvis. Severe degenerative changes of the lower   lumbar spine. Partially imaged hardware in the right femoral diaphysis. Atherosclerosis.   CT Cervical Spine w/o Contrast    Narrative    EXAM: CT CERVICAL SPINE W/O CONTRAST  LOCATION: [facility]  DATE: 8/3/2024    INDICATION: fall, neck pain  COMPARISON: 04/15/2019 cervical spine CT.  TECHNIQUE: Routine CT Cervical Spine without IV contrast. Multiplanar reformats. Dose reduction techniques were used.    FINDINGS:  VERTEBRA: No acute cervical spine fracture. Rightward curvature to the cervical spine. Kyphosis spanning the cervical spine. 2 mm anterolisthesis of C3 on C4 and C4 on C5. 2 mm anterolisthesis of C7 on T1. Severe atlantodental degenerative change. Mild   to moderate C4-C5 degenerative disc disease. Moderate to severe C5-C6 and severe C6-C7 degenerative disc disease. Moderate right C3-C4 facet arthropathy with moderate to severe left C2-C3 through C4-C5 facet arthropathy. Multilevel mild bilateral facet   arthropathy  elsewhere.    CANAL/FORAMINA: Multilevel mild spinal canal stenosis with multilevel mild bilateral neural foraminal stenosis.    PARASPINAL: Pacemaker leads partially visualized.      Impression    IMPRESSION:  1.  No fracture or posttraumatic subluxation.  2.  Osteopenia with degenerative change as above. No high-grade spinal canal or neural foraminal stenosis.         Assessment & Plan      Moira Andre is a 89 year old female with history of CHF, CKD, and hypertension who presents with her daughter in law via EMS for neck and right-sided pain. Patient reports that four days ago, she was getting out of bed and walked a few steps when she fell, landing on the heating register. She was able to get up on her own afterwards. Kristyn was seen in the ED twice for this fall earlier this week. She has not fallen again since then. Currently, patient has pain in her neck, right shoulder, right hip, right groin, and right leg, and daughter in law says that these symtoms were present prior to the fall. She currently has a UTI and daughter in law is concerned that she has not been taking her antibiotic. Last pain medication was taken around 0-11am this morning. Kristyn lives at home alone and has a home health nurse visit daily though they do not help with her medications. Daughter in law thinks that the patient's mentation has been declining and living at home alone may no longer be safe.      ## UTI  ## Early RLE cellulitis  ## Right knee swelling and pain  Patient has failed outpatient management of her UTI, she was discharge with antibiotics and has been too weak and unable to get up and take her PO medications and unable to care for her cat as well.  She had fallen just prior to her 1st ER admission.  Admit to inpatient (failed outpatient treatment  IV Ceftriaxone 1 g every 24 hours  Lactate elevated at 2.3.  Will hold off on any further IV fluids in the setting of recent heart failure admission.  Repeat lactate at 4 PM  "today and if it still elevated, will consider fluids.  Urine culture showing E. coli   PT/OT evaluation recommending TCU placement  No significant erythema noticed with right lower extremity on 8/4/2024.   Ortho consulted to evaluate right knee.  Patient has had previous history of infection of right knee with need for explant and reimplant.     ## CKD stage IV  Cr is at baseline at 2.12  Hold torsemide for now until infectious workup is complete.  Can restart tomorrow if stable     ## Atrial fibrillation   ## Chronic anticoagulation  ## PPM single chamber post AVN ablation  ## Hx of diastolic CHF  Patient recently underwent AVN ablation and single chamber PPM placement.  Continue Eliquis  Hold torsemide due to elevated lactate     ## ASCVD with PCI OM  ## Mild-Mod Aortic stenosis  ## Moderate mitral stenosis  ## Hyperlipidemia  Continue plavix  Continue lipitor  Continue Metoprolol    ## Type 2 diabetes mellitus  Hemoglobin A1c at 7.2 On 6/27/2024  On home glimepiride.  Hold this for now.    Carb controlled diet with sliding scale insulin aspart     ## Depression  Continue Wellbutrin  Continue Cymbalta     ## GERD  Continue Protonix     ## Asthma  Continue albuterol MDI     Diet:  Cardiac  DVT Prophylaxis: DOAC  Charles Catheter: Not present  Lines: None     Cardiac Monitoring: None  Code Status:  FULL       Clinically Significant Risk Factors Present on Admission               # Drug Induced Coagulation Defect: home medication list includes an anticoagulant medication    # Hypertension: Noted on problem list  # Chronic heart failure with preserved ejection fraction: heart failure noted on problem list and last echo with EF >50%       # DMII: A1C = 7.2 % (Ref range: 0.0 - 5.6 %) within past 6 months    # Obesity: Estimated body mass index is 39.47 kg/m  as calculated from the following:    Height as of this encounter: 1.702 m (5' 7\").    Weight as of this encounter: 114.3 kg (252 lb).       # Financial/Environmental " Concerns:    # Asthma: noted on problem list  # Pacemaker present        Medically Ready for Discharge: Anticipated in 2 to 4 days    Discharge pending completion of Ortho follow-up and TCU placement      Dionna Estevez MD, MD  [phone](p)

## 2024-08-05 ENCOUNTER — PATIENT OUTREACH (OUTPATIENT)
Dept: CARE COORDINATION | Facility: CLINIC | Age: 89
End: 2024-08-05
Payer: COMMERCIAL

## 2024-08-05 ENCOUNTER — APPOINTMENT (OUTPATIENT)
Dept: PHYSICAL THERAPY | Facility: CLINIC | Age: 89
DRG: 690 | End: 2024-08-05
Payer: COMMERCIAL

## 2024-08-05 ENCOUNTER — TELEPHONE (OUTPATIENT)
Dept: FAMILY MEDICINE | Facility: CLINIC | Age: 89
End: 2024-08-05
Payer: COMMERCIAL

## 2024-08-05 LAB
GLUCOSE BLDC GLUCOMTR-MCNC: 110 MG/DL (ref 70–99)
GLUCOSE BLDC GLUCOMTR-MCNC: 117 MG/DL (ref 70–99)
GLUCOSE BLDC GLUCOMTR-MCNC: 125 MG/DL (ref 70–99)
GLUCOSE BLDC GLUCOMTR-MCNC: 129 MG/DL (ref 70–99)
GLUCOSE BLDC GLUCOMTR-MCNC: 199 MG/DL (ref 70–99)

## 2024-08-05 PROCEDURE — 99232 SBSQ HOSP IP/OBS MODERATE 35: CPT | Performed by: STUDENT IN AN ORGANIZED HEALTH CARE EDUCATION/TRAINING PROGRAM

## 2024-08-05 PROCEDURE — 97530 THERAPEUTIC ACTIVITIES: CPT | Mod: GP

## 2024-08-05 PROCEDURE — 250N000013 HC RX MED GY IP 250 OP 250 PS 637: Performed by: INTERNAL MEDICINE

## 2024-08-05 PROCEDURE — 97116 GAIT TRAINING THERAPY: CPT | Mod: GP

## 2024-08-05 PROCEDURE — 120N000001 HC R&B MED SURG/OB

## 2024-08-05 PROCEDURE — 250N000011 HC RX IP 250 OP 636: Performed by: INTERNAL MEDICINE

## 2024-08-05 RX ORDER — CEFTRIAXONE 2 G/1
2 INJECTION, POWDER, FOR SOLUTION INTRAMUSCULAR; INTRAVENOUS EVERY 24 HOURS
Status: DISCONTINUED | OUTPATIENT
Start: 2024-08-05 | End: 2024-08-07 | Stop reason: HOSPADM

## 2024-08-05 RX ADMIN — MICONAZOLE NITRATE: 2 POWDER TOPICAL at 08:20

## 2024-08-05 RX ADMIN — CYCLOBENZAPRINE 5 MG: 5 TABLET, FILM COATED ORAL at 11:37

## 2024-08-05 RX ADMIN — CYCLOBENZAPRINE 5 MG: 5 TABLET, FILM COATED ORAL at 05:35

## 2024-08-05 RX ADMIN — PANTOPRAZOLE SODIUM 20 MG: 20 TABLET, DELAYED RELEASE ORAL at 08:12

## 2024-08-05 RX ADMIN — ACETAMINOPHEN 975 MG: 325 TABLET, FILM COATED ORAL at 20:17

## 2024-08-05 RX ADMIN — BUPROPION HYDROCHLORIDE 100 MG: 100 TABLET, FILM COATED, EXTENDED RELEASE ORAL at 20:16

## 2024-08-05 RX ADMIN — APIXABAN 2.5 MG: 2.5 TABLET, FILM COATED ORAL at 08:12

## 2024-08-05 RX ADMIN — OXYCODONE HYDROCHLORIDE 5 MG: 5 TABLET ORAL at 05:35

## 2024-08-05 RX ADMIN — ACETAMINOPHEN 975 MG: 325 TABLET, FILM COATED ORAL at 08:12

## 2024-08-05 RX ADMIN — DULOXETINE HYDROCHLORIDE 60 MG: 60 CAPSULE, DELAYED RELEASE ORAL at 20:17

## 2024-08-05 RX ADMIN — SENNOSIDES AND DOCUSATE SODIUM 2 TABLET: 50; 8.6 TABLET ORAL at 20:17

## 2024-08-05 RX ADMIN — MICONAZOLE NITRATE: 2 POWDER TOPICAL at 20:16

## 2024-08-05 RX ADMIN — APIXABAN 2.5 MG: 2.5 TABLET, FILM COATED ORAL at 20:17

## 2024-08-05 RX ADMIN — ATORVASTATIN CALCIUM 20 MG: 20 TABLET, FILM COATED ORAL at 08:11

## 2024-08-05 RX ADMIN — CEFTRIAXONE SODIUM 2 G: 2 INJECTION, POWDER, FOR SOLUTION INTRAMUSCULAR; INTRAVENOUS at 15:43

## 2024-08-05 RX ADMIN — METOPROLOL TARTRATE 75 MG: 50 TABLET, FILM COATED ORAL at 08:12

## 2024-08-05 RX ADMIN — METOPROLOL TARTRATE 75 MG: 50 TABLET, FILM COATED ORAL at 20:16

## 2024-08-05 RX ADMIN — OXYCODONE HYDROCHLORIDE 5 MG: 5 TABLET ORAL at 11:37

## 2024-08-05 RX ADMIN — ACETAMINOPHEN 975 MG: 325 TABLET, FILM COATED ORAL at 15:41

## 2024-08-05 ASSESSMENT — ACTIVITIES OF DAILY LIVING (ADL)
ADLS_ACUITY_SCORE: 45
ADLS_ACUITY_SCORE: 45
ADLS_ACUITY_SCORE: 47
ADLS_ACUITY_SCORE: 45
ADLS_ACUITY_SCORE: 47
ADLS_ACUITY_SCORE: 47
ADLS_ACUITY_SCORE: 45
ADLS_ACUITY_SCORE: 47
ADLS_ACUITY_SCORE: 47
ADLS_ACUITY_SCORE: 45
ADLS_ACUITY_SCORE: 47
ADLS_ACUITY_SCORE: 45
ADLS_ACUITY_SCORE: 47
ADLS_ACUITY_SCORE: 45
ADLS_ACUITY_SCORE: 45
ADLS_ACUITY_SCORE: 47

## 2024-08-05 NOTE — PROGRESS NOTES
Clinic Care Coordination Contact  Ambulatory Care Coordination to Inpatient Care Management   Hand-In Communication    Date:  August 5, 2024  Name: Moira Andre is enrolled in Ambulatory Care Coordination program and I am the Lead Care Coordinator.  CC Contact Information: Epic InBasket + phone  Payor Source: Payor: [facility] / Plan: [employer] MEDICARE ADVANTAGE / Product Type: HMO /   Current services in place:     Please see the CC Snaphot and Care Management Flowsheets for specific  details of this Moira Andre care plan.   Additional details/specific concerns r/t this admission:    Home Padmaja Simons lives alone and she likely does not have adequate funds to pay for CHONG.      I will follow this admission in Epic. Please feel free to contact me with questions or for further collaboration in discharge planning.    Maria L Mckeon,  [facility]  Clinic Care Coordinator  [facility]  [phone]  [email or web address]

## 2024-08-05 NOTE — CONSULTS
Care Management Initial Consult    General Information  Assessment completed with: Patient,    Type of CM/SW Visit: Initial Assessment    Primary Care Provider verified and updated as needed: Yes   Readmission within the last 30 days: previous discharge plan unsuccessful      Reason for Consult: discharge planning  Advance Care Planning: Advance Care Planning Reviewed: no concerns identified          Communication Assessment  Patient's communication style: spoken language (English or Bilingual)    Hearing Difficulty or Deaf: no   Wear Glasses or Blind: yes    Cognitive  Cognitive/Neuro/Behavioral: WDL     Arousal Level: opens eyes spontaneously                Living Environment:   People in home: alone     Current living Arrangements: apartment      Able to return to prior arrangements: no       Family/Social Support:  Care provided by: self, homecare agency  Provides care for: no one  Marital Status:   Children, Neighbor          Description of Support System: Supportive    Support Assessment: Lacks necessary supervision and assistance    Current Resources:   Patient receiving home care services: Yes  Skilled Home Care Services: Physical Therapy, Skilled Nursing, Home Health Aid  Community Resources: Home Care  Equipment currently used at home: walker, rolling  Supplies currently used at home:      Employment/Financial:  Employment Status: retired        Financial Concerns: none           Does the patient's insurance plan have a 3 day qualifying hospital stay waiver?  No      Lifestyle & Psychosocial Needs:  Social Determinants of Health     Food Insecurity: Low Risk  (10/26/2023)    Food Insecurity     Within the past 12 months, did you worry that your food would run out before you got money to buy more?: No     Within the past 12 months, did the food you bought just not last and you didn t have money to get more?: No   Depression: At risk (7/23/2024)    PHQ-2     PHQ-2 Score: 3   Housing Stability: Low  Risk  (10/26/2023)    Housing Stability     Do you have housing? : Yes     Are you worried about losing your housing?: No   Tobacco Use: Medium Risk (7/23/2024)    Patient History     Smoking Tobacco Use: Former     Smokeless Tobacco Use: Never     Passive Exposure: Not on file   Financial Resource Strain: Low Risk  (10/26/2023)    Financial Resource Strain     Within the past 12 months, have you or your family members you live with been unable to get utilities (heat, electricity) when it was really needed?: No   Alcohol Use: Not on file   Transportation Needs: Low Risk  (10/26/2023)    Transportation Needs     Within the past 12 months, has lack of transportation kept you from medical appointments, getting your medicines, non-medical meetings or appointments, work, or from getting things that you need?: No   Physical Activity: Not on file   Interpersonal Safety: Low Risk  (1/11/2024)    Interpersonal Safety     Do you feel physically and emotionally safe where you currently live?: Yes     Within the past 12 months, have you been hit, slapped, kicked or otherwise physically hurt by someone?: No     Within the past 12 months, have you been humiliated or emotionally abused in other ways by your partner or ex-partner?: No   Stress: No Stress Concern Present (6/10/2021)    [employer] [place] [employer] - Occupational Stress Questionnaire     Feeling of Stress : Not at all   Social Connections: Unknown (6/10/2021)    Social Connection and Isolation Panel [NHANES]     Frequency of Communication with Friends and Family: Not on file     Frequency of Social Gatherings with Friends and Family: Not on file     Attends Worship Services: Not on file     Active Member of Clubs or Organizations: Not on file     Attends Club or Organization Meetings: Not on file     Marital Status:    Health Literacy: Not on file       Functional Status:  Prior to admission patient needed assistance:              Mental Health  "Status:  Mental Health Status: No Current Concerns       Chemical Dependency Status:  Chemical Dependency Status: No Current Concerns             Values/Beliefs:  Spiritual, Cultural Beliefs, Yazidi Practices, Values that affect care:                 Additional Information:  Per MD note on 08/03 patient \"is a 89 year old female with history of CHF, CKD, and hypertension who presents with her daughter in law via EMS for neck and right-sided pain\" and that \"Patient reports that four days ago, she was getting out of bed and walked a few steps when she fell, landing on the heating register.\" Patient was seen in ED twice last week for this issue.    Per SW consult patient lives alone in an apartment and receives assistance with cleaning biweekly, and assistance taking out garbage on occasion. Patient receives home health care services and states that she gets assistance with transportation from daughter-in-law, Sophia, and neighbor, Marleen. Patient reports that other than that she does not get any assistance. SW unable to assess full extent of patient support in home.    SW reviewed address, PCP, contacts, and insurance with patient. Patient is agreeable to TCU after her stay and says that she will likely get transportation from her daughter in law or neighbor depending on discharge day or time. SW let patient know we do not have her neighbor on file but can take a number for her if needed. Patient prefers TCU placement at [facility], [facility], or [facility] which SW sent and will follow.     Addendum 1400: Patient accepted at [facility] and would like private room but does not want to pay. SW will find out if patient can go to a private room at [facility] with a shared room at [facility] as backup.     Addendum 1544: Patient is agreeable to shared room at [facility]. SW will confirm if daughter in law can provide transport. Per MD patient is medically ready for discharge to TCU. Voicemail left for daughter in law requesting " call back.     Addendum 1628: Patient's daughter-in-law, Sophia, called SW and said she can help with transport tomorrow morning but has a prior appointment in the afternoon at 2:15.     PAS-RR    D: Per DHS regulation, SW completed and submitted PAS-RR to [place] [employer] Direct Connect via the Senior LinkAge Line.  PAS-RR confirmation # is :[id number]    I: SW spoke with patient and they are aware a PAS-RR has been submitted.  SW reviewed with patient that they may be contacted for a follow up appointment within 10 days of hospital discharge if their SNF stay is < 30 days.  Contact information for Senior LinkAge Line was also provided.    A: patient verbalized understanding.    P: Further questions may be directed to [facility] LinkAge Line at #[phone], option #4 for PAS-RR staff.     Becky Emerson, [facility]  Inpatient Care Management

## 2024-08-05 NOTE — PROGRESS NOTES
[facility]    Patient is currently receiving services with [facility]. The patient is currently receiving RN/PT/HA services.  Patient's  and home health team have been notified that patient is under inpatient status. [facility] Liaison will continue to follow patient during stay. Please provide orders to resume home care at time of discharge if appropriate.

## 2024-08-05 NOTE — PROGRESS NOTES
[facility]    Medicine Progress Note - Hospitalist Service    Date of Admission:  8/3/2024    Assessment & Plan    Moira Andre is a 89 year old female with history of CHF, CKD, and hypertension who presents with her daughter in law via EMS for neck and right-sided pain. Patient reports that four days ago, she was getting out of bed and walked a few steps when she fell, landing on the heating register. She was able to get up on her own afterwards. Kristyn was seen in the ED twice for this fall earlier this week. She has not fallen again since then. Currently, patient has pain in her neck, right shoulder, right hip, right groin, and right leg, and daughter in law says that these symtoms were present prior to the fall. She currently has a UTI and daughter in law is concerned that she has not been taking her antibiotic. Last pain medication was taken around 0-11am this morning. Kristyn lives at home alone and has a home health nurse visit daily though they do not help with her medications. Daughter in law thinks that the patient's mentation has been declining and living at home alone may no longer be safe.      ## UTI  ## Early RLE cellulitis  ## Right knee swelling and pain  Patient has failed outpatient management of her UTI, she was discharge with antibiotics and has been too weak and unable to get up and take her PO medications and unable to care for her cat as well.  She had fallen just prior to her 1st ER admission.  Admit to inpatient (failed outpatient treatment  IV Ceftriaxone 1 g every 24 hours  Lactate elevated at 2.3.  Will hold off on any further IV fluids in the setting of recent heart failure admission.  Repeat lactate at 4 PM today and if it still elevated, will consider fluids.  Urine culture showing E. coli   PT/OT evaluation recommending TCU placement  No significant erythema noticed with right lower extremity on 8/4/2024.   Ortho consulted to evaluate right knee.  Patient has  "had previous history of infection of right knee with need for explant and reimplant.  -Ortho consult      ## CKD stage IV  Cr is at baseline at 2.12  Hold torsemide for now until infectious workup is complete.  Can restart tomorrow if stable     ## Atrial fibrillation   ## Chronic anticoagulation  ## PPM single chamber post AVN ablation  ## Hx of diastolic CHF  Patient recently underwent AVN ablation and single chamber PPM placement.  Continue Eliquis  Restart torsemide from tomorrow     ## ASCVD with PCI OM  ## Mild-Mod Aortic stenosis  ## Moderate mitral stenosis  ## Hyperlipidemia  Continue plavix  Continue lipitor  Continue Metoprolol     ## Type 2 diabetes mellitus  Hemoglobin A1c at 7.2 On 6/27/2024  On home glimepiride.  Hold this for now.    Carb controlled diet with sliding scale insulin aspart     ## Depression  Continue Wellbutrin  Continue Cymbalta     ## GERD  Continue Protonix     ## Asthma  Continue albuterol MDI             Diet: Moderate Consistent Carb (60 g CHO per Meal) Diet    DVT Prophylaxis: DOAC  Charles Catheter: Not present  Lines: None     Cardiac Monitoring: None  Code Status: Full Code      Clinically Significant Risk Factors                  # Hypertension: Noted on problem list  # Chronic heart failure with preserved ejection fraction: heart failure noted on problem list and last echo with EF >50%          # DMII: A1C = 7.2 % (Ref range: 0.0 - 5.6 %) within past 6 months, PRESENT ON ADMISSION  # Obesity: Estimated body mass index is 39.47 kg/m  as calculated from the following:    Height as of this encounter: 1.702 m (5' 7\").    Weight as of this encounter: 114.3 kg (252 lb)., PRESENT ON ADMISSION     # Financial/Environmental Concerns: none  # Asthma: noted on problem list  # Pacemaker present       Disposition Plan     Medically Ready for Discharge: Ready Now       Discussed with         Bam Kaur MD  Hospitalist Service  [facility] " Hospital  Securely message with [employer] (more info)  Text page via [facility] Paging/Directory   ______________________________________________________________________    Interval History   Patient seen and examined at bedside no acute overnight events.  She does not have any pain with eye movement.  No diplopia or blurred vision    Pain well-controlled      Physical Exam   Vital Signs: Temp: 98.7  F (37.1  C) Temp src: Oral BP: 106/57 Pulse: 71   Resp: 16 SpO2: 93 % O2 Device: None (Room air)    Weight: 252 lbs 0 oz    Physical Exam  Eyes:      Comments: Brusing around orbital and infraorbital brusing   Cardiovascular:      Rate and Rhythm: Normal rate and regular rhythm.      Heart sounds: Normal heart sounds.   Pulmonary:      Effort: No respiratory distress.      Breath sounds: Normal breath sounds.   Abdominal:      General: There is no distension.      Palpations: Abdomen is soft.      Tenderness: There is no abdominal tenderness.          Medical Decision Making       40 MINUTES SPENT BY ME on the date of service doing chart review, history, exam, documentation & further activities per the note.      Data         Imaging results reviewed over the past 24 hrs:   No results found for this or any previous visit (from the past 24 hour(s)).

## 2024-08-05 NOTE — TELEPHONE ENCOUNTER
Home Care is calling regarding an established patient with [facility].        10/18/2023    11:57 AM   Home Care Information   Date of Home Care episode start 10/18/2023   Current following provider Dr. Churchill     Requesting orders from: Maryan Churchill  Provider is following patient: Yes  Is this a 60-day recertification request?  No    Orders Requested    Social Work  Request for initial evaluation and treatment (one time)     Verbal orders given.  Home Care will send orders for provider to sign.  Confirmed ok to leave a detailed message with call back.  Contact information confirmed and updated as needed.    Jessica Diaz RN

## 2024-08-06 ENCOUNTER — APPOINTMENT (OUTPATIENT)
Dept: PHYSICAL THERAPY | Facility: CLINIC | Age: 89
DRG: 690 | End: 2024-08-06
Payer: COMMERCIAL

## 2024-08-06 ENCOUNTER — APPOINTMENT (OUTPATIENT)
Dept: CT IMAGING | Facility: CLINIC | Age: 89
DRG: 690 | End: 2024-08-06
Attending: STUDENT IN AN ORGANIZED HEALTH CARE EDUCATION/TRAINING PROGRAM
Payer: COMMERCIAL

## 2024-08-06 LAB
ANION GAP SERPL CALCULATED.3IONS-SCNC: 14 MMOL/L (ref 7–15)
BUN SERPL-MCNC: 34.2 MG/DL (ref 8–23)
CALCIUM SERPL-MCNC: 9.4 MG/DL (ref 8.8–10.4)
CHLORIDE SERPL-SCNC: 101 MMOL/L (ref 98–107)
CREAT SERPL-MCNC: 1.84 MG/DL (ref 0.51–0.95)
EGFRCR SERPLBLD CKD-EPI 2021: 26 ML/MIN/1.73M2
ERYTHROCYTE [DISTWIDTH] IN BLOOD BY AUTOMATED COUNT: 16.2 % (ref 10–15)
GLUCOSE BLDC GLUCOMTR-MCNC: 111 MG/DL (ref 70–99)
GLUCOSE BLDC GLUCOMTR-MCNC: 121 MG/DL (ref 70–99)
GLUCOSE BLDC GLUCOMTR-MCNC: 128 MG/DL (ref 70–99)
GLUCOSE BLDC GLUCOMTR-MCNC: 167 MG/DL (ref 70–99)
GLUCOSE SERPL-MCNC: 139 MG/DL (ref 70–99)
HCO3 SERPL-SCNC: 22 MMOL/L (ref 22–29)
HCT VFR BLD AUTO: 40.1 % (ref 35–47)
HGB BLD-MCNC: 12.2 G/DL (ref 11.7–15.7)
MCH RBC QN AUTO: 26.5 PG (ref 26.5–33)
MCHC RBC AUTO-ENTMCNC: 30.4 G/DL (ref 31.5–36.5)
MCV RBC AUTO: 87 FL (ref 78–100)
PLATELET # BLD AUTO: 207 10E3/UL (ref 150–450)
POTASSIUM SERPL-SCNC: 4.7 MMOL/L (ref 3.4–5.3)
RBC # BLD AUTO: 4.6 10E6/UL (ref 3.8–5.2)
SODIUM SERPL-SCNC: 137 MMOL/L (ref 135–145)
WBC # BLD AUTO: 7.7 10E3/UL (ref 4–11)

## 2024-08-06 PROCEDURE — 97116 GAIT TRAINING THERAPY: CPT | Mod: GP

## 2024-08-06 PROCEDURE — 120N000001 HC R&B MED SURG/OB

## 2024-08-06 PROCEDURE — 99232 SBSQ HOSP IP/OBS MODERATE 35: CPT | Performed by: STUDENT IN AN ORGANIZED HEALTH CARE EDUCATION/TRAINING PROGRAM

## 2024-08-06 PROCEDURE — 80048 BASIC METABOLIC PNL TOTAL CA: CPT | Performed by: STUDENT IN AN ORGANIZED HEALTH CARE EDUCATION/TRAINING PROGRAM

## 2024-08-06 PROCEDURE — 250N000011 HC RX IP 250 OP 636: Performed by: INTERNAL MEDICINE

## 2024-08-06 PROCEDURE — 250N000013 HC RX MED GY IP 250 OP 250 PS 637: Performed by: INTERNAL MEDICINE

## 2024-08-06 PROCEDURE — 97530 THERAPEUTIC ACTIVITIES: CPT | Mod: GP

## 2024-08-06 PROCEDURE — 85027 COMPLETE CBC AUTOMATED: CPT | Performed by: STUDENT IN AN ORGANIZED HEALTH CARE EDUCATION/TRAINING PROGRAM

## 2024-08-06 PROCEDURE — 36415 COLL VENOUS BLD VENIPUNCTURE: CPT | Performed by: STUDENT IN AN ORGANIZED HEALTH CARE EDUCATION/TRAINING PROGRAM

## 2024-08-06 PROCEDURE — 70450 CT HEAD/BRAIN W/O DYE: CPT

## 2024-08-06 PROCEDURE — 250N000013 HC RX MED GY IP 250 OP 250 PS 637: Performed by: STUDENT IN AN ORGANIZED HEALTH CARE EDUCATION/TRAINING PROGRAM

## 2024-08-06 PROCEDURE — 250N000012 HC RX MED GY IP 250 OP 636 PS 637: Performed by: HOSPITALIST

## 2024-08-06 RX ADMIN — MICONAZOLE NITRATE: 2 POWDER TOPICAL at 20:11

## 2024-08-06 RX ADMIN — DULOXETINE HYDROCHLORIDE 60 MG: 60 CAPSULE, DELAYED RELEASE ORAL at 19:56

## 2024-08-06 RX ADMIN — TORSEMIDE 20 MG: 20 TABLET ORAL at 08:02

## 2024-08-06 RX ADMIN — CEFTRIAXONE SODIUM 2 G: 2 INJECTION, POWDER, FOR SOLUTION INTRAMUSCULAR; INTRAVENOUS at 14:13

## 2024-08-06 RX ADMIN — METOPROLOL TARTRATE 75 MG: 50 TABLET, FILM COATED ORAL at 19:57

## 2024-08-06 RX ADMIN — ATORVASTATIN CALCIUM 20 MG: 20 TABLET, FILM COATED ORAL at 08:03

## 2024-08-06 RX ADMIN — TORSEMIDE 20 MG: 20 TABLET ORAL at 17:01

## 2024-08-06 RX ADMIN — APIXABAN 2.5 MG: 2.5 TABLET, FILM COATED ORAL at 08:03

## 2024-08-06 RX ADMIN — MICONAZOLE NITRATE: 2 POWDER TOPICAL at 08:07

## 2024-08-06 RX ADMIN — ACETAMINOPHEN 975 MG: 325 TABLET, FILM COATED ORAL at 19:57

## 2024-08-06 RX ADMIN — PANTOPRAZOLE SODIUM 20 MG: 20 TABLET, DELAYED RELEASE ORAL at 08:02

## 2024-08-06 RX ADMIN — ACETAMINOPHEN 975 MG: 325 TABLET, FILM COATED ORAL at 13:54

## 2024-08-06 RX ADMIN — ACETAMINOPHEN 975 MG: 325 TABLET, FILM COATED ORAL at 08:03

## 2024-08-06 RX ADMIN — BUPROPION HYDROCHLORIDE 100 MG: 100 TABLET, FILM COATED, EXTENDED RELEASE ORAL at 20:11

## 2024-08-06 RX ADMIN — INSULIN ASPART 1 UNITS: 100 INJECTION, SOLUTION INTRAVENOUS; SUBCUTANEOUS at 22:04

## 2024-08-06 RX ADMIN — OXYCODONE HYDROCHLORIDE 5 MG: 5 TABLET ORAL at 05:48

## 2024-08-06 RX ADMIN — METOPROLOL TARTRATE 75 MG: 50 TABLET, FILM COATED ORAL at 08:02

## 2024-08-06 RX ADMIN — APIXABAN 2.5 MG: 2.5 TABLET, FILM COATED ORAL at 19:57

## 2024-08-06 ASSESSMENT — ACTIVITIES OF DAILY LIVING (ADL)
ADLS_ACUITY_SCORE: 47
ADLS_ACUITY_SCORE: 53
ADLS_ACUITY_SCORE: 51
ADLS_ACUITY_SCORE: 47
ADLS_ACUITY_SCORE: 47
ADLS_ACUITY_SCORE: 51
ADLS_ACUITY_SCORE: 53
ADLS_ACUITY_SCORE: 47
ADLS_ACUITY_SCORE: 47
ADLS_ACUITY_SCORE: 51
ADLS_ACUITY_SCORE: 47
ADLS_ACUITY_SCORE: 51
ADLS_ACUITY_SCORE: 47
ADLS_ACUITY_SCORE: 51
ADLS_ACUITY_SCORE: 53
ADLS_ACUITY_SCORE: 51
ADLS_ACUITY_SCORE: 47
ADLS_ACUITY_SCORE: 51
ADLS_ACUITY_SCORE: 47
ADLS_ACUITY_SCORE: 51
ADLS_ACUITY_SCORE: 47

## 2024-08-06 NOTE — PROGRESS NOTES
[facility]    Medicine Progress Note - Hospitalist Service    Date of Admission:  8/3/2024    Assessment & Plan   Moira Andre is a 89 year old female with history of CHF, CKD, and hypertension who presents with her daughter in law via EMS for neck and right-sided pain. Patient reports that four days ago, she was getting out of bed and walked a few steps when she fell, landing on the heating register. She was able to get up on her own afterwards. Kristyn was seen in the ED twice for this fall earlier this week. She has not fallen again since then. Currently, patient has pain in her neck, right shoulder, right hip, right groin, and right leg, and daughter in law says that these symtoms were present prior to the fall. She currently has a UTI and daughter in law is concerned that she has not been taking her antibiotic. Last pain medication was taken around 0-11am this morning. Kristyn lives at home alone and has a home health nurse visit daily though they do not help with her medications. Daughter in law thinks that the patient's mentation has been declining and living at home alone may no longer be safe.      ## UTI  ## Early RLE cellulitis  ## Right knee swelling and pain  Patient has failed outpatient management of her UTI, she was discharge with antibiotics and has been too weak and unable to get up and take her PO medications and unable to care for her cat as well.  She had fallen just prior to her 1st ER admission.  Admit to inpatient (failed outpatient treatment  IV Ceftriaxone 1 g every 24 hours  Lactate elevated at 2.3.  Will hold off on any further IV fluids in the setting of recent heart failure admission.  Repeat lactate at 4 PM today and if it still elevated, will consider fluids.  Urine culture showing E. coli   PT/OT evaluation recommending TCU placement  No significant erythema noticed with right lower extremity on 8/4/2024.   Ortho consulted to evaluate right knee.  Patient has  had previous history of infection of right knee with need for explant and reimplant.  -Ortho consulted and recommended conservative management  # Mild confusion  Delirium precautions:  Up during the day with lights on  Lights off at night, avoid interruptions during the night as much as possible  Family visits  Encourage wearing glasses  Reorientation  Avoid opioids, benzodiazepines, anticholinergics.    Continue to ensure proper nutrition, fluid and electrolyte balance. Monitor for infections, hypoxia, metabolic derangements, or other causes of delirium.   CT head ordered         ## CKD stage IV  Cr is at baseline at 2.12  Cr stable at 1.84   ## Atrial fibrillation   ## Chronic anticoagulation  ## PPM single chamber post AVN ablation  ## Hx of diastolic CHF  Patient recently underwent AVN ablation and single chamber PPM placement.  Continue Eliquis  Restarted torsemide today      ## ASCVD with PCI OM  ## Mild-Mod Aortic stenosis  ## Moderate mitral stenosis  ## Hyperlipidemia  Continue plavix  Continue lipitor  Continue Metoprolol     ## Type 2 diabetes mellitus  Hemoglobin A1c at 7.2 On 6/27/2024  On home glimepiride.  Hold this for now.    Carb controlled diet with sliding scale insulin aspart     ## Depression  Continue Wellbutrin  Continue Cymbalta     ## GERD  Continue Protonix     ## Asthma  Continue albuterol MDI             Diet: Moderate Consistent Carb (60 g CHO per Meal) Diet    DVT Prophylaxis: DOAC  Charles Catheter: Not present  Lines: None     Cardiac Monitoring: None  Code Status: Full Code      Clinically Significant Risk Factors                  # Hypertension: Noted on problem list  # Chronic heart failure with preserved ejection fraction: heart failure noted on problem list and last echo with EF >50%          # DMII: A1C = 7.2 % (Ref range: 0.0 - 5.6 %) within past 6 months, PRESENT ON ADMISSION  # Obesity: Estimated body mass index is 39.47 kg/m  as calculated from the following:    Height as of  "this encounter: 1.702 m (5' 7\").    Weight as of this encounter: 114.3 kg (252 lb)., PRESENT ON ADMISSION     # Financial/Environmental Concerns: none  # Asthma: noted on problem list  # Pacemaker present       Disposition Plan     Medically Ready for Discharge: Ready Now             Bam Kaur MD  Hospitalist Service  [facility]  Securely message with [facility] (more info)  Text page via [employer] Paging/Directory   ______________________________________________________________________    Interval History   No overnight events  Having mild confusion  Not in distress    Physical Exam   Vital Signs: Temp: 98.3  F (36.8  C) Temp src: Oral BP: (!) 161/81 Pulse: 67   Resp: 18 SpO2: 94 % O2 Device: None (Room air)    Weight: 252 lbs 0 oz    Physical Exam  Cardiovascular:      Rate and Rhythm: Normal rate and regular rhythm.   Pulmonary:      Effort: No respiratory distress.      Breath sounds: Normal breath sounds. No wheezing.   Abdominal:      General: There is no distension.      Palpations: Abdomen is soft.      Tenderness: There is no abdominal tenderness.   Neurological:      Mental Status: She is alert.          Medical Decision Making       40 MINUTES SPENT BY ME on the date of service doing chart review, history, exam, documentation & further activities per the note.      Data     I have personally reviewed the following data over the past 24 hrs:    7.7  \   12.2   / 207     137 101 34.2 (H) /  111 (H)   4.7 22 1.84 (H) \       Imaging results reviewed over the past 24 hrs:   No results found for this or any previous visit (from the past 24 hour(s)).  "

## 2024-08-06 NOTE — PROGRESS NOTES
Care Management Discharge Note    Discharge Date: 08/06/2024       Discharge Disposition: Skilled Nursing Facility    Discharge Services: Home Care, Housekeeping/[facility]    Discharge DME: Walker    Discharge Transportation: family or friend will provide    Private pay costs discussed: Not applicable    Does the patient's insurance plan have a 3 day qualifying hospital stay waiver?  No    PAS Confirmation Code: [id number]  Patient/family educated on Medicare website which has current facility and service quality ratings: yes    Education Provided on the Discharge Plan: No  Persons Notified of Discharge Plans: [facility], bedside Rn, charge Rn, melod[facility] Reynolds   Patient/Family in Agreement with the Plan: yes    Handoff Referral Completed: No    Additional Information:  Writer able to see that previous  had noted plan for pt to discharge to [facility] if prior authorization is obtained by melodie.  Writer sent message to  to see if he feels that pt is stable for discharge in event that prior authorization is received today. Writer will await doctors response.   Writer spoke with Gail with [facility] to see if prior authorization has been received yet. Gail states she has not received prior authorization yet but they should hear back about this within the next few hours. Gail will notify writer when more is known.    Writer spoke with Gail at 148pm. Gail states no prior authorization received yet. Writer sent message back asking if they could take pt tomorrow if prior authorization approved. Writer awaiting response. Writer updated doctor of no discharge today due to no prior authorization.     Addendum: Writer is informed by Gail that they are able to take pt tomorrow if prior authorization is approved.     Debby Glez, BSW  Social Work  [facility]

## 2024-08-07 ENCOUNTER — LAB REQUISITION (OUTPATIENT)
Dept: LAB | Facility: CLINIC | Age: 89
End: 2024-08-07

## 2024-08-07 ENCOUNTER — PATIENT OUTREACH (OUTPATIENT)
Dept: CARE COORDINATION | Facility: CLINIC | Age: 89
End: 2024-08-07
Payer: COMMERCIAL

## 2024-08-07 VITALS
DIASTOLIC BLOOD PRESSURE: 72 MMHG | HEIGHT: 67 IN | RESPIRATION RATE: 16 BRPM | TEMPERATURE: 98.2 F | HEART RATE: 72 BPM | SYSTOLIC BLOOD PRESSURE: 147 MMHG | BODY MASS INDEX: 39.55 KG/M2 | OXYGEN SATURATION: 95 % | WEIGHT: 252 LBS

## 2024-08-07 DIAGNOSIS — Z11.1 ENCOUNTER FOR SCREENING FOR RESPIRATORY TUBERCULOSIS: ICD-10-CM

## 2024-08-07 LAB
ANION GAP SERPL CALCULATED.3IONS-SCNC: 12 MMOL/L (ref 7–15)
BUN SERPL-MCNC: 32.6 MG/DL (ref 8–23)
CALCIUM SERPL-MCNC: 9.1 MG/DL (ref 8.8–10.4)
CHLORIDE SERPL-SCNC: 99 MMOL/L (ref 98–107)
CREAT SERPL-MCNC: 1.83 MG/DL (ref 0.51–0.95)
EGFRCR SERPLBLD CKD-EPI 2021: 26 ML/MIN/1.73M2
ERYTHROCYTE [DISTWIDTH] IN BLOOD BY AUTOMATED COUNT: 15.9 % (ref 10–15)
GLUCOSE BLDC GLUCOMTR-MCNC: 111 MG/DL (ref 70–99)
GLUCOSE BLDC GLUCOMTR-MCNC: 125 MG/DL (ref 70–99)
GLUCOSE SERPL-MCNC: 124 MG/DL (ref 70–99)
HCO3 SERPL-SCNC: 26 MMOL/L (ref 22–29)
HCT VFR BLD AUTO: 40.6 % (ref 35–47)
HGB BLD-MCNC: 12.8 G/DL (ref 11.7–15.7)
MCH RBC QN AUTO: 26.8 PG (ref 26.5–33)
MCHC RBC AUTO-ENTMCNC: 31.5 G/DL (ref 31.5–36.5)
MCV RBC AUTO: 85 FL (ref 78–100)
PLATELET # BLD AUTO: 289 10E3/UL (ref 150–450)
POTASSIUM SERPL-SCNC: 5 MMOL/L (ref 3.4–5.3)
POTASSIUM SERPL-SCNC: 5.6 MMOL/L (ref 3.4–5.3)
RBC # BLD AUTO: 4.77 10E6/UL (ref 3.8–5.2)
SODIUM SERPL-SCNC: 137 MMOL/L (ref 135–145)
WBC # BLD AUTO: 8 10E3/UL (ref 4–11)

## 2024-08-07 PROCEDURE — 99239 HOSP IP/OBS DSCHRG MGMT >30: CPT | Performed by: STUDENT IN AN ORGANIZED HEALTH CARE EDUCATION/TRAINING PROGRAM

## 2024-08-07 PROCEDURE — 250N000013 HC RX MED GY IP 250 OP 250 PS 637: Performed by: STUDENT IN AN ORGANIZED HEALTH CARE EDUCATION/TRAINING PROGRAM

## 2024-08-07 PROCEDURE — 36415 COLL VENOUS BLD VENIPUNCTURE: CPT | Performed by: STUDENT IN AN ORGANIZED HEALTH CARE EDUCATION/TRAINING PROGRAM

## 2024-08-07 PROCEDURE — 85027 COMPLETE CBC AUTOMATED: CPT | Performed by: STUDENT IN AN ORGANIZED HEALTH CARE EDUCATION/TRAINING PROGRAM

## 2024-08-07 PROCEDURE — 80048 BASIC METABOLIC PNL TOTAL CA: CPT | Performed by: STUDENT IN AN ORGANIZED HEALTH CARE EDUCATION/TRAINING PROGRAM

## 2024-08-07 PROCEDURE — 250N000013 HC RX MED GY IP 250 OP 250 PS 637: Performed by: INTERNAL MEDICINE

## 2024-08-07 PROCEDURE — 84132 ASSAY OF SERUM POTASSIUM: CPT | Performed by: STUDENT IN AN ORGANIZED HEALTH CARE EDUCATION/TRAINING PROGRAM

## 2024-08-07 RX ORDER — OXYCODONE HYDROCHLORIDE 5 MG/1
2.5 TABLET ORAL EVERY 8 HOURS PRN
Qty: 10 TABLET | Refills: 0 | Status: SHIPPED | OUTPATIENT
Start: 2024-08-07 | End: 2024-08-12

## 2024-08-07 RX ORDER — TORSEMIDE 20 MG/1
20 TABLET ORAL
DISCHARGE
Start: 2024-08-07 | End: 2024-08-17

## 2024-08-07 RX ORDER — CEFDINIR 300 MG/1
300 CAPSULE ORAL DAILY
DISCHARGE
Start: 2024-08-07 | End: 2024-08-10

## 2024-08-07 RX ORDER — ACETAMINOPHEN 500 MG
1000 TABLET ORAL EVERY 8 HOURS PRN
DISCHARGE
Start: 2024-08-07 | End: 2024-08-16

## 2024-08-07 RX ADMIN — APIXABAN 2.5 MG: 2.5 TABLET, FILM COATED ORAL at 09:56

## 2024-08-07 RX ADMIN — ATORVASTATIN CALCIUM 20 MG: 20 TABLET, FILM COATED ORAL at 09:56

## 2024-08-07 RX ADMIN — ACETAMINOPHEN 975 MG: 325 TABLET, FILM COATED ORAL at 09:57

## 2024-08-07 RX ADMIN — PANTOPRAZOLE SODIUM 20 MG: 20 TABLET, DELAYED RELEASE ORAL at 09:56

## 2024-08-07 RX ADMIN — CYCLOBENZAPRINE 5 MG: 5 TABLET, FILM COATED ORAL at 12:09

## 2024-08-07 RX ADMIN — ACETAMINOPHEN 975 MG: 325 TABLET, FILM COATED ORAL at 14:43

## 2024-08-07 RX ADMIN — METOPROLOL TARTRATE 75 MG: 50 TABLET, FILM COATED ORAL at 09:56

## 2024-08-07 RX ADMIN — TORSEMIDE 20 MG: 20 TABLET ORAL at 09:56

## 2024-08-07 RX ADMIN — CYCLOBENZAPRINE 5 MG: 5 TABLET, FILM COATED ORAL at 04:29

## 2024-08-07 RX ADMIN — OXYCODONE HYDROCHLORIDE 5 MG: 5 TABLET ORAL at 12:09

## 2024-08-07 RX ADMIN — MICONAZOLE NITRATE: 2 POWDER TOPICAL at 11:56

## 2024-08-07 ASSESSMENT — ACTIVITIES OF DAILY LIVING (ADL)
ADLS_ACUITY_SCORE: 51

## 2024-08-07 NOTE — PROGRESS NOTES
Clinic Care Coordination Contact  Care Coordination Transition Communication    Clinical Data: Patient was hospitalized at [facility]  from 8/3/24 to 8/7/24 with diagnosis of neck and right-sided pain.     Assessment: Patient has transitioned to TCU/ARU for short term rehabilitation:    Facility Name: [facility]   Transition Communication:  Notified facility of Primary Care- Care Coordination support via Epic fax.    Plan: Care Coordinator will await notification from facility staff informing of patient's discharge plans/needs. Care Coordinator will review chart and outreach to facility staff every 4 weeks and as needed.     Maria L Mckeon,  [facility]  Clinic Care Coordinator  [facility]  [phone]  [email or web address]

## 2024-08-07 NOTE — PROGRESS NOTES
MD Notification    Notified Person: MD    Notified Person Name:    Notification Date/Time: 1249 8/7/24    Notification Interaction: the potassium that came back 5.6 is hemolyzed so they are redrawing it.     Purpose of Notification:    Orders Received:    Comments:

## 2024-08-07 NOTE — PROGRESS NOTES
Care Management Discharge Note    Discharge Date: 08/07/2024       Discharge Disposition: Skilled Nursing Facility    Discharge Services: Home Care, Housekeeping/[facility]    Discharge DME: Walker    Discharge Transportation: [facility] at 1430 via skyway    Private pay costs discussed: Not applicable    Does the patient's insurance plan have a 3 day qualifying hospital stay waiver?  Yes     Which insurance plan 3 day waiver is available? Alternative insurance waiver    Will the waiver be used for post-acute placement? Yes    PAS Confirmation Code: [id number]  Patient/family educated on Medicare website which has current facility and service quality ratings: yes    Education Provided on the Discharge Plan: No  Persons Notified of Discharge Plans: Hospitalist, HUC, Charge RN, bedside RN, daughter  Patient/Family in Agreement with the Plan: yes    Handoff Referral Completed: Yes    Additional Information:  Patient has been accepted to [facility] today, auth is approved. Spoke with Sophai, who will transport pt via skyway today at 1430. PAS completed.  to send discharge orders/scripts once in. June at [facility] is aware of time.    Addendum 1411: Faxed discharge orders and script to TCU.    STEF Dominguez  Social Work  [facility]

## 2024-08-07 NOTE — LETTER
[facility]   To:   Melodie BERNABE SW          Please give to facility    From:   Maria L Mckeon  [facility]  Care Coordinator   [facility]   P: [phone]   [email or web address]   Patient Name:  Moira Andre YOB: 1933   Admit date: 8/7/24      *Information Needed:  Please contact me when the patient will discharge (or if they will move to long term care)- include the discharge date, disposition, and main diagnosis   If the patient is discharged with home care services, please provide the name of the agency    Also- Please inform me if a care conference is being held.   Phone, Fax or Email with information                              Thank you

## 2024-08-07 NOTE — CARE PLAN
PRIMARY Concern: UTI, recurrent falls  SAFETY RISK Concerns (fall risk, behaviors, etc.): Falls risk      Isolation/Type: None  Tests/Procedures for NEXT shift: BG checks  Consults? (Pending/following, signed-off?) PT/OT rec TCU, SW following for placement plans,   Where is patient from? (Home, TCU, etc.): Home alone  Other Important info for NEXT shift: N/A  Anticipated DC date & active delays: TBD   _____________________________________________________________________________  SUMMARY NOTE:  Orientation/Cognitive: A&Ox3, deso situation,   Observation Goals (Met/ Not Met): Inpatient  Mobility Level/Assist Equipment: A1 gbW to BSC  Antibiotics & Plan (IV/po, length of tx left): IV Rocephin for UTI  Pain Management: PRN flexeril   Tele/VS/O2: VSS on RA X mild HTN /87  ABNL Lab/BG: , 111  Diet: Mod CHO  Bowel/Bladder: Incont to B/B,   Skin Concerns: Abd/morgan area rash, bruises to R forehead, scattered bruises  Drains/Devices: PIV SL  Patient Stated Goal for Today: Sleep

## 2024-08-07 NOTE — PLAN OF CARE
Goal Outcome Evaluation:         Top portion must ALWAYS be completed  PRIMARY Concern: UTI, recurrent falls  SAFETY RISK Concerns (fall risk, behaviors, etc.): fall risk      Isolation/Type: none  Tests/Procedures for NEXT shift:   Consults? (Pending/following, signed-off?) PT consult complete; ortho surg consulted; CM/SW consult pending for placement  Where is patient from? (Home, TCU, etc.): home  Other Important info for NEXT shift: patient reports ongoing discomfort in RLE  Anticipated DC date & active delays: pending clinical stability  _____________________________________________________________________________  SUMMARY NOTE:  Orientation/Cognitive: AO  Inpatient Status  Mobility Level/Assist Equipment: A1 GBW  Antibiotics & Plan (IV/po, length of tx left): IV roceph for UTI  Pain Management: PRN oxy, PRN robaxin, PRN tylenol  Tele/VS/O2: VSS on RA  ABNL Lab/BG: see chart  Diet: reg  Bowel/Bladder: incontinent of urine  Skin Concerns: abd/morgan area red diffuse rash; bruising on R forehead, scattered bruising  Drains/Devices: PIV SL  Patient Stated Goal for Today:                
Goal Outcome Evaluation:      PRIMARY Concern: UTI, recurrent falls  SAFETY RISK Concerns (fall risk, behaviors, etc.):Fall risk  Isolation/Type: None  Tests/Procedures for NEXT shift: BG checks, CT scan  Consults? (Pending/following, signed-off?) SW following for placement plans  Where is patient from? (Home, TCU, etc.): Home alone  Other Important info for NEXT shift: N/A  Anticipated DC date & active delays: Today afternoon via daughter to [place]   ____________________________________________________________________  SUMMARY NOTE:  Orientation/Cognitive: A&Ox3 disoriented to place, intermittent confusion  Observation Goals (Met/ Not Met): Inpatient  Mobility Level/Assist Equipment: ASX1 up to BSC  Antibiotics & Plan (IV/po, length of tx left): IV Rocephin for UTI  Pain Management: Scheduled Tylenol  Tele/VS/O2: VSS on RA  ABNL Lab/BG: creatinine 1.84  Diet: Mod carb diet  Bowel/Bladder: Incontinent B&B, pure wick in use  Skin Concerns: Abd/morgan area rash, bruising to R forehead, scattered bruises  Drains/Devices: PIV SL  Patient Stated Goal for Today: Sleep             
Goal Outcome Evaluation:      Plan of Care Reviewed With: patient    Overall Patient Progress: no changeOverall Patient Progress: no change     Summary: 08/03/2024 [id number]  Care Plan Summary Note:Right shoulder, arm, hip pain from a fall the beginning of the week  Orientation: Alert and oriented 3-4 disoriented to time  Observation Goals (met & not met): Inpatient  Activity Level: Not OOB, T/R  Fall Risk: Yes  Behavior & Aggression Tool Color: Green  Pain Management: No pain at rest, tylenol given, not effective  ABNL VS/O2: VSS on RA  ABNL Lab/BG: None  Diet: Regular  Bowel/Bladder: Incontinent of Urine, purwick in place, no BM  Drains/Devices: PIV SL  Tests/Procedures for next shift: PT consult  Anticipated DC date: TBD  Other Important Info: Patient has right face, eye, front head bruises, Right toes wound, scattered bruising Upper and Lower extremities, edema 2+        
Goal Outcome Evaluation:      Summary: 08/03/2024 1900- 5070  Care Plan Summary Note:Right shoulder, arm, hip pain from a fall the beginning of the week  Orientation: Alert and oriented 3-4 disoriented to time  Observation Goals (met & not met): Inpatient  Activity Level: Did not get out of bed for me, turn and repo  Fall Risk: Yes  Behavior & Aggression Tool Color: Green  Pain Management: No pain at rest, tylenol given, not effective  ABNL VS/O2: VSS on RA  ABNL Lab/BG: None  Diet: Regular  Bowel/Bladder: Incontinent of Urine, purwick in place, no BM, Had BM this morning per Pt  Drains/Devices: PIV SL  Tests/Procedures for next shift: PT consult  Anticipated DC date: TBD  Other Important Info: Patient has right face, eye, front head bruises, Right toes wound, scattered bruising Upper and Lower extremities, edema 2+       
Goal Outcome Evaluation:  PRIMARY Concern: UTI, recurrent falls  SAFETY RISK Concerns (fall risk, behaviors, etc.): Falls risk      Isolation/Type: None  Tests/Procedures for NEXT shift: BG checks  Consults? (Pending/following, signed-off?) SW following for placement plans, suppose to go to [facility] today.  Where is patient from? (Home, TCU, etc.): Home alone  Other Important info for NEXT shift: N/A  Anticipated DC date & active delays: Today afternoon via daughter to [place]   ____________________________________________________________________  SUMMARY NOTE:  Orientation/Cognitive: A&Ox4, int confusion  Observation Goals (Met/ Not Met): Inpatient  Mobility Level/Assist Equipment: A1 gbW to BSC  Antibiotics & Plan (IV/po, length of tx left): IV Rocephin for UTI  Pain Management: PRN oxy x1, c/o RUE soreness/stiffness from not moving much  Tele/VS/O2: VSS on RA  ABNL Lab/BG:  & 121  Diet: Mod CHO  Bowel/Bladder: Incont to B/B, no BM but gassy  Skin Concerns: Abd/morgan area rash, bruising to R forehead, scattered bruises  Drains/Devices: PIV SL  Patient Stated Goal for Today: Sleep  
Goal Outcome Evaluation:  Shift [id number]    PRIMARY Concern: UTI, recurrent falls  SAFETY RISK Concerns (fall risk, behaviors, etc.): fall risk, behavior green     Isolation/Type: none  Tests/Procedures for NEXT shift: BG check  Consults? (Pending/following, signed-off?) PT recs TCU, Ortho following, SW following for placement  Where is patient from? (Home, TCU, etc.): Home alone  Other Important info for NEXT shift:   Anticipated DC date & active delays: pending clinical stability  ___________________________________________  SUMMARY NOTE:  Orientation/Cognitive: Aox4; int confusion  Observation Goals Met: Inpatient Status  Mobility Level/Assist Equipment: A1 GBW  Antibiotics & Plan (IV/po, length of tx left): IV rocephin for UTI  Pain Management: Denies pain this shift, has PRN flexeril, PRN oxy, leah tyl  Tele/VS/O2: VSS on RA  ABNL Lab/BG:   Diet: Mod CHO  Bowel/Bladder: incontinent of urine  Skin Concerns: abd/morgan area red diffuse rash; bruising on R forehead, scattered bruising  Drains/Devices: PIV SL  Patient Stated Goal for Today: Sleep    
Physical Therapy Discharge Summary    Reason for therapy discharge:    Discharged to transitional care facility.    Progress towards therapy goal(s). See goals on Care Plan in [facility] electronic health record for goal details.  Goals partially met.  Barriers to achieving goals:   discharge from facility.    Therapy recommendation(s):    Continued therapy is recommended.  Rationale/Recommendations:  Pt is below baseline. Pt currently requiring assist with all functional mobility. Pt presents with deficits in activity tolerance, balance, and strength with RLE pain being the main limiter at this time. Patient is progressing well with inpatient therapy, ambulating up to 125' now with FWW. Due to these deficits, pt is a high falls risk and unsafe to return home at this time, as patient lives alone. Pt would benefit from continued skilled PT services via TCU to address deficits and improve IND with safety and functional mobility in order to return to PLOF.      
Shift  8/4-5/24 23:00-07:30    PRIMARY Concern: UTI, recurrent falls  SAFETY RISK Concerns (fall risk, behaviors, etc.): fall risk, behavior green     Isolation/Type: none  Tests/Procedures for NEXT shift: BG check  Consults? (Pending/following, signed-off?) PT recs TCU, Ortho following, SW following for placement  Where is patient from? (Home, TCU, etc.): Home alone  Other Important info for NEXT shift: N/a  Anticipated DC date & active delays: pending clinical stability  ___________________________________________  SUMMARY NOTE:  Orientation/Cognitive: A/Ox4; int confusion  Observation Goals Met: Inpatient Status  Mobility Level/Assist Equipment: Ax1 w/ GB/W  Antibiotics & Plan (IV/po, length of tx left): IV rocephin for UTI  Pain Management:  PRN flexeril, PRN oxy given   Tele/VS/O2: VSS on RA  ABNL Lab/BG:   Diet: Mod CHO  Bowel/Bladder: incontinent of urine purewick in place  Skin Concerns: abd/morgan area red diffuse rash; bruising on R forehead, scattered bruising  Drains/Devices: PIV SL  Patient Stated Goal for Today: Sleep    
13-Jan-2022 15:14

## 2024-08-07 NOTE — UTILIZATION REVIEW
Admission Status; Secondary Review Determination   Under the authority of the Utilization Management Committee, the utilization review process indicated a secondary review on Moira Andre. The review outcome is based on review of the medical records, discussions with staff, and applying clinical experience noted on the date of the review.   (x) Inpatient Status Appropriate - This patient's medical care is consistent with medical management for inpatient care and reasonable inpatient medical practice.     RATIONALE FOR DETERMINATION   Moira Andre is a 89 yr old female with CKD, CHF, HTN who presented with right side pain.  Fall 4 days PTA.  See in ED 2x prior for same fall.  Patient also with known UTI found 8/1 and daughter with concern on compliance with abx as acute weakness and unable to  to get up and take her PO medication and care for herself from the acute UTI.  Also concern for early RLE cellulitis vs septic joint.  IV antibiotics.  On 8/4 lactic acid elevated but holding further IVF as recent heart failure admission and BNP>5100.   Knee swelling assessed by Ortho as hx right knee septic joint in setting of prior joint replacement with close monitoring.    8/5 BP drop to low 100s.  Holding torsemide in setting volume depletion but attempting to avoid fluids due to CHF and need to resume as acute illness improved.  Cr reported baseline however 6/2024 was 1.5 then consistently 1.9 since CHF admission.  Was 2.27 on this presentation and now 1.84.  Medically complicated elderly woman with acute illness contributing to weakness above baseline and failed attempt at outpatient management.  Per [employer].   422.101 requires plans to  Provide coverage of, by furnishing, arranging for or making payment for, all services that are covered by Part A and Part B of Medicare  and to  comply with general coverage guidelines included in original Medicare manuals and instructions unless superseded by regulation in the part  or related instructions  . .  Given the recent April 5, 2023 CMS Final Ruling,  Medicare Advantage plans are prohibited from denying inpatient coverage to patients who would otherwise meet inpatient coverage if they had traditional Medicare coverage.      At the time of admission with the information available to the attending physician more than 2 nights Hospital complex care was anticipated, based on patient risk of adverse outcome if treated as outpatient and complex care required. Inpatient admission is appropriate based on the Medicare guidelines.   The information on this document is developed by the utilization review team in order for the business office to ensure compliance. This only denotes the appropriateness of proper admission status and does not reflect the quality of care rendered.   The definitions of Inpatient Status and Observation Status used in making the determination above are those provided in the CMS Coverage Manual, Chapter 1 and Chapter 6, section 70.4.   Sincerely,   Becky Paz MD  Utilization Review  Physician Advisor  [facility]

## 2024-08-08 ENCOUNTER — DOCUMENTATION ONLY (OUTPATIENT)
Dept: GERIATRICS | Facility: CLINIC | Age: 89
End: 2024-08-08
Payer: COMMERCIAL

## 2024-08-08 ENCOUNTER — TRANSITIONAL CARE UNIT VISIT (OUTPATIENT)
Dept: GERIATRICS | Facility: CLINIC | Age: 89
End: 2024-08-08
Payer: COMMERCIAL

## 2024-08-08 VITALS
TEMPERATURE: 98.2 F | HEIGHT: 67 IN | OXYGEN SATURATION: 95 % | BODY MASS INDEX: 39.47 KG/M2 | HEART RATE: 74 BPM | SYSTOLIC BLOOD PRESSURE: 128 MMHG | RESPIRATION RATE: 18 BRPM | DIASTOLIC BLOOD PRESSURE: 72 MMHG

## 2024-08-08 DIAGNOSIS — K21.9 GASTROESOPHAGEAL REFLUX DISEASE WITHOUT ESOPHAGITIS: ICD-10-CM

## 2024-08-08 DIAGNOSIS — I35.0 NONRHEUMATIC AORTIC VALVE STENOSIS: ICD-10-CM

## 2024-08-08 DIAGNOSIS — I25.119 CORONARY ARTERY DISEASE INVOLVING NATIVE CORONARY ARTERY OF NATIVE HEART WITH ANGINA PECTORIS (H): ICD-10-CM

## 2024-08-08 DIAGNOSIS — N18.32 STAGE 3B CHRONIC KIDNEY DISEASE (H): ICD-10-CM

## 2024-08-08 DIAGNOSIS — E11.21 TYPE 2 DIABETES MELLITUS WITH DIABETIC NEPHROPATHY, WITHOUT LONG-TERM CURRENT USE OF INSULIN (H): ICD-10-CM

## 2024-08-08 DIAGNOSIS — I50.32 CHRONIC HEART FAILURE WITH PRESERVED EJECTION FRACTION (H): ICD-10-CM

## 2024-08-08 DIAGNOSIS — M62.81 GENERALIZED MUSCLE WEAKNESS: ICD-10-CM

## 2024-08-08 DIAGNOSIS — R29.6 RECURRENT FALLS: Primary | ICD-10-CM

## 2024-08-08 DIAGNOSIS — F32.1 MODERATE MAJOR DEPRESSION (H): ICD-10-CM

## 2024-08-08 DIAGNOSIS — I10 ESSENTIAL HYPERTENSION: ICD-10-CM

## 2024-08-08 DIAGNOSIS — S00.83XD CONTUSION OF FACE, SCALP AND NECK, SUBSEQUENT ENCOUNTER: ICD-10-CM

## 2024-08-08 DIAGNOSIS — S00.03XD CONTUSION OF FACE, SCALP AND NECK, SUBSEQUENT ENCOUNTER: ICD-10-CM

## 2024-08-08 DIAGNOSIS — G93.40 ENCEPHALOPATHY: ICD-10-CM

## 2024-08-08 DIAGNOSIS — N30.00 ACUTE CYSTITIS WITHOUT HEMATURIA: ICD-10-CM

## 2024-08-08 DIAGNOSIS — E78.5 HYPERLIPIDEMIA LDL GOAL <70: ICD-10-CM

## 2024-08-08 DIAGNOSIS — S10.93XD CONTUSION OF FACE, SCALP AND NECK, SUBSEQUENT ENCOUNTER: ICD-10-CM

## 2024-08-08 PROCEDURE — 99310 SBSQ NF CARE HIGH MDM 45: CPT | Performed by: PHYSICIAN ASSISTANT

## 2024-08-08 PROCEDURE — P9604 ONE-WAY ALLOW PRORATED TRIP: HCPCS | Performed by: PHYSICIAN ASSISTANT

## 2024-08-08 PROCEDURE — 36415 COLL VENOUS BLD VENIPUNCTURE: CPT | Performed by: PHYSICIAN ASSISTANT

## 2024-08-08 PROCEDURE — 86481 TB AG RESPONSE T-CELL SUSP: CPT | Performed by: PHYSICIAN ASSISTANT

## 2024-08-08 NOTE — PROGRESS NOTES
[facility] GERIATRICS    PRIMARY CARE PROVIDER AND CLINIC:  Maryan Chucrhill MD, [address] / [place]                [place] [address]  Chief Complaint   Patient presents with    Hospital F/U      [facility] Medical Record Number:  [id number]  [facility] of Service where encounter took place:  [facility] ([facility]) [[id number]]      HPI:    89yo female with PMHx HFpEF, CKD, HTN, afib on chronic AC with apixaban, DMII, recent fall with UTI who was admitted at [facility] from 8/3 - 8/7, 2024 after presenting following recurrent falls with generalized weakness. Found to have ongoing UTI, concern for medication noncompliance. Treated for UTI, confusion improved. Referred to TCU for rehab, med management.    Pt is presently evaluated as initial visit. Sitting up in recliner at bedside, offers no complaints. Has extensive bruising to face from fall, denies pain. No cp/sob. Is eating well. Denies constipation. Primary goal is to return home as soon as possible, has a cat which she misses very much. Prior to hospitalization lives in apartment in [place]. Has family nearby to check in on her. Full code.    CODE STATUS/ADVANCE DIRECTIVES DISCUSSION:  Prior  CPR/Full code   ALLERGIES:   Allergies   Allergen Reactions    Aspirin Hives     Reaction occurred during childhood.     Lidocaine Itching    Lisinopril Cough    Losartan      Hyperkalemia      Metformin      Elevated lactic acid    Minocycline      Yellow Dye Allergy. Minocycline has Yellow Dye #10.    Mounjaro [Tirzepatide] Diarrhea and GI Disturbance    Salicylates Hives    Yellow Dye Hives     Rxn to yellow tablet. Eyes swelled shut.     Yellow Dyes (Non-Tartrazine) Hives      PAST MEDICAL HISTORY:   Past Medical History:   Diagnosis Date    Aortic valve sclerosis     heart murmur, no AS    Arrhythmia     PAT, PVC    Aspirin allergy     Plavix use long term    Asthma     CKD (chronic kidney disease) stage 3, GFR 30-59 ml/min (H)     x 2007 atleast     Congestive heart failure, unspecified     Depression     Diabetes mellitus (H) 2010    Diastolic dysfunction, left ventricle 2013    grade 2, nl ef    HTN (hypertension)     Lactic acidosis 08/2018    due to dehydration and metformin    Migraine headache     Mitral stenosis     mild, 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    Myocardial infarction (H) 9/2007, cath 2013 ml    BMS: stent to OM, diag, nl EF, echo /C angia 2013 , f/u cath no lesion >40%    Nephrolithiasis     right side    OA (osteoarthritis) of knee     Obesity     Rheumatoid arthritis flare (H)     prednisone    Sleep apnea     restarted using cpap 2017    TIA (transient ischaemic attack)     Ventral hernia, unspecified, without mention of obstruction or gangrene       PAST SURGICAL HISTORY:   has a past surgical history that includes Cholecystectomy; Biopsy breast; appendectomy; 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  FAMILY HISTORY: family history includes C.A.D. in her father; Neurologic Disorder in her mother.  SOCIAL HISTORY:   reports that she quit smoking about 51 years ago. Her smoking use included cigarettes. She started smoking about 52 years ago. She has a 0.3 pack-year smoking history. She has never used smokeless tobacco. She reports current alcohol use. She reports that she does not use drugs.  Patient's living condition: lives alone    Post Discharge Medication Reconciliation Status:   MED REC REQUIRED  Post Medication Reconciliation Status: discharge medications reconciled, continue medications without change       Current Outpatient Medications   Medication Sig Dispense Refill    acetaminophen (TYLENOL) 500 MG tablet Take 2 tablets  (1,000 mg) by mouth every 8 hours as needed for mild pain      albuterol (PROAIR HFA/PROVENTIL HFA/VENTOLIN HFA) 108 (90 Base) MCG/ACT inhaler Inhale 2 puffs into the lungs every 6 hours as needed for shortness of breath, wheezing or cough For shortness of breath      apixaban ANTICOAGULANT (ELIQUIS) 2.5 MG tablet Take 1 tablet (2.5 mg) by mouth 2 times daily 180 tablet 3    atorvastatin (LIPITOR) 20 MG tablet Take 1 tablet (20 mg) by mouth daily for cholesterol 100 tablet 3    blood glucose (ACCU-CHEK GUIDE) test strip Use to test blood sugar once daily or as directed. 100 strip 3    blood glucose (ACCU-CHEK SOFTCLIX) lancing device Lancing device to be used with lancets. 1 each 0    blood glucose monitoring (SOFTCLIX) lancets Use to test blood sugar once daily. 100 each 3    buPROPion (WELLBUTRIN SR) 100 MG 12 hr tablet Take 1 tablet (100 mg) by mouth daily for mood 90 tablet 3    calcium carbonate (TUMS) 500 MG chewable tablet Take 1 chew tab by mouth 2 times daily as needed for heartburn      cefdinir (OMNICEF) 300 MG capsule Take 1 capsule (300 mg) by mouth daily for 3 days      Cholecalciferol (VITAMIN D) 125 MCG (5000 UT) capsule Take 1 tablet by mouth every evening      cyclobenzaprine (FLEXERIL) 5 MG tablet Take 1 tablet (5 mg) by mouth 3 times daily as needed for muscle spasms 10 tablet 0    DULoxetine (CYMBALTA) 60 MG capsule Take 1 capsule (60 mg) by mouth daily 90 capsule 3    fexofenadine (ALLEGRA) 60 MG tablet Take 60 mg by mouth daily      fluticasone-salmeterol (WIXELA INHUB) 100-50 MCG/ACT inhaler USE 1 INHALATION BY MOUTH EVERY  12 HOURS (Patient taking differently: Inhale 1 puff into the lungs daily USE 1 INHALATION BY MOUTH EVERY  12 HOURS) 180 each 3    Menthol, Topical Analgesic, (ICY HOT MEDICATED SPRAY EX) Externally apply topically daily as needed (pain, in neck, back, hand)      metoprolol tartrate (LOPRESSOR) 25 MG tablet Take 3 tablets (75 mg) by mouth 2 times daily 540 tablet 1     "miconazole (MICATIN) 2 % external cream Apply topically 2 times daily as needed for other (rash/irritation)      nitroGLYcerin (NITROSTAT) 0.4 MG sublingual tablet FOR CHEST PAIN PLACE 1 TABLET UNDER THE TONGUE EVERY 5 MINUTES FOR 3 DOSES. IF SYMPTOMS PERSIST 5 MINUTES AFTER 1ST DOSE CALL 911 25 tablet 0    oxyCODONE (ROXICODONE) 5 MG tablet Take 0.5 tablets (2.5 mg) by mouth every 8 hours as needed for severe pain 10 tablet 0    pantoprazole (PROTONIX) 20 MG EC tablet Take 1 tablet (20 mg) by mouth daily 90 tablet 3    torsemide (DEMADEX) 20 MG tablet Take 1 tablet (20 mg) by mouth 2 times daily      triamcinolone (KENALOG) 0.1 % external cream Apply topically 2 times daily as needed for irritation       No current facility-administered medications for this visit.       ROS:  4 point ROS including Respiratory, CV, GI and , other than that noted in the HPI,  is negative    Vitals:  /72   Pulse 74   Temp 98.2  F (36.8  C)   Resp 18   Ht 1.702 m (5' 7\")   SpO2 95%   BMI 39.47 kg/m    Exam:  GEN: well-developed, well-nourished, appears comfortable  HEENT: Normocephalic,ecchymosis to right frontal scalp and surrounding right orbit, EOM intact bilaterally, sclera clear, conjunctiva normal, nose & mouth patent, mucous membranes moist  CHEST: lungs CTA bilaterally, no increased work of breathing, no wheeze, crackles, rhonchi  HEART: RRR, S1 & S2  ABD: soft, nontender, nondistended, no guarding or rigidity  MSK: AROM bilateral UE/LE  NEURO: awake, alert. CN II-XII grossly intact. Sensation grossly intact to light touch.   SKIN: warm & dry without rash, no pedal edema    Lab/Diagnostic data:  Recent labs in [facility] reviewed by me today.  and Most Recent 3 CBC's:  Recent Labs   Lab Test 08/07/24  1234 08/06/24  0734 08/03/24  1327   WBC 8.0 7.7 10.1   HGB 12.8 12.2 12.8   MCV 85 87 87    207 239     Most Recent 3 BMP's:  Recent Labs   Lab Test 08/07/24  1322 08/07/24  1135 08/07/24  1128 08/07/24  0225 " 08/06/24  0748 08/06/24  0734 08/04/24  1622 08/04/24  0700   NA  --  137  --   --   --  137  --  139   POTASSIUM 5.0 5.6*  --   --   --  4.7  --  4.3   CHLORIDE  --  99  --   --   --  101  --  102   CO2  --  26  --   --   --  22  --  25   BUN  --  32.6*  --   --   --  34.2*  --  35.4*   CR  --  1.83*  --   --   --  1.84*  --  2.12*   ANIONGAP  --  12  --   --   --  14  --  12   TELLY  --  9.1  --   --   --  9.4  --  8.9   GLC  --  124* 125* 111*   < > 139*   < > 142*    < > = values in this interval not displayed.       ASSESSMENT/PLAN:    UTI, E.coli  Generalized muscle weakness  Failed outpatient management.  -Continues on cefdinir to complete Abx course  -PT/OT evaluations  -SW for discharge planning    R knee pain  Facial contusions  2/2 recurrent falls. Evaluated by orthopedics while inpatient, imaging neg. Conservative management.  -Pain control with PRN tylenol, oxycodone, flexeril    Encephalopathy, infectious, improving  CTH neg.  -Maintain sleep/wake cycle  -Supportive management    CKD-3b  Baseline Cr near 2.  -Monitor periodically    Afib s/p AVN with PPM  Chronic, stable.  -Continue apixaban, metoprolol    HFpEF  CAD   Aortic stenosis  Mitral stenosis  HTN / HLD  Chronic, stable.  -Continue torsemide, statin  -Monitor volume state    DMII  A1c 7.2% on 6/27/24. Diet controlled.  -Monitor BG trend    Depression  Chronic, stable.  -Continue bupropion, cymbalta    GERD  Chronic, stable.  -Continue protonix    Asthma, mild intermittent  Chronic, stable.  -Continue albuterol      Total time spent with patient visit at the skilled nursing facility was 45 min including patient visit and review of past records.     Electronically signed by:  Rimma High

## 2024-08-09 LAB
GAMMA INTERFERON BACKGROUND BLD IA-ACNC: 0 IU/ML
M TB IFN-G BLD-IMP: NEGATIVE
M TB IFN-G CD4+ BCKGRND COR BLD-ACNC: 10 IU/ML
MITOGEN IGNF BCKGRD COR BLD-ACNC: 0.01 IU/ML
MITOGEN IGNF BCKGRD COR BLD-ACNC: 0.01 IU/ML
QUANTIFERON MITOGEN: 10 IU/ML
QUANTIFERON NIL TUBE: 0 IU/ML
QUANTIFERON TB1 TUBE: 0.01 IU/ML
QUANTIFERON TB2 TUBE: 0.01

## 2024-08-11 NOTE — DISCHARGE SUMMARY
"[facility]  Hospitalist Discharge Summary      Date of Admission:  8/3/2024  Date of Discharge:  8/7/2024  3:37 PM  Discharging Provider: Bam Kaur MD  Discharge Service: Hospitalist Service    Discharge Diagnoses       Clinically Significant Risk Factors     # DMII: A1C = 7.2 % (Ref range: 0.0 - 5.6 %) within past 6 months  # Obesity: Estimated body mass index is 39.47 kg/m  as calculated from the following:    Height as of this encounter: 1.702 m (5' 7\").    Weight as of this encounter: 114.3 kg (252 lb).       Follow-ups Needed After Discharge     Unresulted Labs Ordered in the Past 30 Days of this Admission       No orders found from 7/4/2024 to 8/4/2024.          Discharge Disposition   Discharged to short-term care facility  Condition at discharge: Stable    Hospital Course    Moira Andre is a 89 year old female with history of CHF, CKD, and hypertension who presents with her daughter in law via EMS for neck and right-sided pain. Patient reports that four days ago, she was getting out of bed and walked a few steps when she fell, landing on the heating register. She was able to get up on her own afterwards. Kristyn was seen in the ED twice for this fall earlier this week. She has not fallen again since then. Currently, patient has pain in her neck, right shoulder, right hip, right groin, and right leg, and daughter in law says that these symtoms were present prior to the fall. She currently has a UTI and daughter in law is concerned that she has not been taking her antibiotic. Last pain medication was taken around 0-11am this morning. Kristyn lives at home alone and has a home health nurse visit daily though they do not help with her medications. Daughter in law thinks that the patient's mentation has been declining and living at home alone may no longer be safe.      ## UTI  ## Early RLE cellulitis  ## Right knee swelling and pain  Patient has failed outpatient " management of her UTI, she was discharge with antibiotics and has been too weak and unable to get up and take her PO medications and unable to care for her cat as well.  She had fallen just prior to her 1st ER admission.  Admit to inpatient (failed outpatient treatment  IV Ceftriaxone 1 g every 24 hours  Lactate elevated at 2.3.  Will hold off on any further IV fluids in the setting of recent heart failure admission.  Repeat lactate at 4 PM today and if it still elevated, will consider fluids.  Urine culture showing E. coli   PT/OT evaluation recommending TCU placement  No significant erythema noticed with right lower extremity on 8/4/2024.   Ortho consulted to evaluate right knee.  Patient has had previous history of infection of right knee with need for explant and reimplant.  -Ortho consulted and recommended conservative management.Exam does not appear consistent with infection or loosening of the components.   Discharged on cefdinir for UTI  Pain control with Tyelonol and low dose oxycodone              # Mild confusion Improving  Delirium precautions:  Up during the day with lights on  Lights off at night, avoid interruptions during the night as much as possible  Family visits  Encourage wearing glasses  Reorientation  Avoid opioids, benzodiazepines, anticholinergics.    Continue to ensure proper nutrition, fluid and electrolyte balance. Monitor for infections, hypoxia, metabolic derangements, or other causes of delirium.   CT head ordered No acute intracranial injury, hemorrhage, mass, or CT evidence of recent ischemia.           ## CKD stage IV  Cr is at baseline at 2.12  Cr stable at 1.84       ## Atrial fibrillation   ## Chronic anticoagulation  ## PPM single chamber post AVN ablation  ## Hx of diastolic CHF  Patient recently underwent AVN ablation and single chamber PPM placement.  Continue Eliquis  Continue torsemide   Recheck BMP      ## ASCVD with PCI OM  ## Mild-Mod Aortic stenosis  ## Moderate mitral  stenosis  ## Hyperlipidemia  Continue lipitor  Continue Metoprolol  Patient no longer on plavix and was discontinued by Cardiology in 12/23     ## Type 2 diabetes mellitus  Hemoglobin A1c at 7.2 On 6/27/2024  Carb controlled diet with sliding scale insulin aspart   Because of patient's age and fall risk and her well-controlled diabetes would stop sulfonylurea.  Patient sugars have been well-controlled in the hospital.  Continue carb controlled diet and outpatient follow-up with primary care provider   ## Depression  Continue Wellbutrin  Continue Cymbalta     ## GERD  Continue Protonix     ## Asthma  Continue albuterol MDI          Consultations This Hospital Stay   PHYSICAL THERAPY ADULT IP CONSULT  ORTHOPEDIC SURGERY IP CONSULT  CARE MANAGEMENT / SOCIAL WORK IP CONSULT  PHYSICAL THERAPY ADULT IP CONSULT  OCCUPATIONAL THERAPY ADULT IP CONSULT    Code Status   Prior    Time Spent on this Encounter   I, Bam Kaur MD, personally saw the patient today and spent greater than 30 minutes discharging this patient.       Bam Kaur MD  [facility] EXTENDED RECOVERY AND SHORT STAY  [address] [place] [id number]  Phone: [phone]  ______________________________________________________________________    Physical Exam   Vital Signs:                    Weight: 252 lbs 0 oz  Physical Exam  HENT:      Head:      Comments: Bruising +  Cardiovascular:      Rate and Rhythm: Normal rate and regular rhythm.      Heart sounds: Normal heart sounds.   Pulmonary:      Effort: Pulmonary effort is normal. No respiratory distress.      Breath sounds: Normal breath sounds.   Abdominal:      General: There is no distension.      Palpations: Abdomen is soft.      Tenderness: There is no abdominal tenderness.             Primary Care Physician   Maryan Churchill    Discharge Orders      Primary Care - Care Coordination Referral      Medication Therapy Management Referral       General info for SNF    Length of Stay Estimate: Short Term Care: Estimated # of Days <30  Condition at Discharge: Improving  Level of care:skilled   Rehabilitation Potential: Fair  Admission H&P remains valid and up-to-date: Yes  Recent Chemotherapy: N/A  Use Nursing Home Standing Orders: Yes     Mantoux instructions    Give two-step Mantoux (PPD) Per Facility Policy Yes     Follow Up and recommended labs and tests    Follow up with prison physician.  The following labs/tests are recommended: cbc and bmp, mag.     Reason for your hospital stay    UTI,Fall confusion     Glucose monitor nursing POCT    Before meals and at bedtime     Activity - Up with nursing assistance     Physical Therapy Adult Consult    Evaluate and treat as clinically indicated.    Reason:  Weakness     Occupational Therapy Adult Consult    Evaluate and treat as clinically indicated.    Reason:  Weakness     Fall precautions     Diet    Follow this diet upon discharge: Orders Placed This Encounter      Moderate Consistent Carb (60 g CHO per Meal) Diet       Significant Results and Procedures   Most Recent 3 CBC's:  Recent Labs   Lab Test 08/07/24  1234 08/06/24  0734 08/03/24  1327   WBC 8.0 7.7 10.1   HGB 12.8 12.2 12.8   MCV 85 87 87    207 239     Most Recent 3 BMP's:  Recent Labs   Lab Test 08/07/24  1322 08/07/24  1135 08/07/24  1128 08/07/24  0225 08/06/24  0748 08/06/24  0734 08/04/24  1622 08/04/24  0700   NA  --  137  --   --   --  137  --  139   POTASSIUM 5.0 5.6*  --   --   --  4.7  --  4.3   CHLORIDE  --  99  --   --   --  101  --  102   CO2  --  26  --   --   --  22  --  25   BUN  --  32.6*  --   --   --  34.2*  --  35.4*   CR  --  1.83*  --   --   --  1.84*  --  2.12*   ANIONGAP  --  12  --   --   --  14  --  12   TELLY  --  9.1  --   --   --  9.4  --  8.9   GLC  --  124* 125* 111*   < > 139*   < > 142*    < > = values in this interval not displayed.   ,   Results for orders placed or performed during the hospital  encounter of 08/03/24   CT Cervical Spine w/o Contrast    Narrative    EXAM: CT CERVICAL SPINE W/O CONTRAST  LOCATION: [facility]  DATE: 8/3/2024    INDICATION: fall, neck pain  COMPARISON: 04/15/2019 cervical spine CT.  TECHNIQUE: Routine CT Cervical Spine without IV contrast. Multiplanar reformats. Dose reduction techniques were used.    FINDINGS:  VERTEBRA: No acute cervical spine fracture. Rightward curvature to the cervical spine. Kyphosis spanning the cervical spine. 2 mm anterolisthesis of C3 on C4 and C4 on C5. 2 mm anterolisthesis of C7 on T1. Severe atlantodental degenerative change. Mild   to moderate C4-C5 degenerative disc disease. Moderate to severe C5-C6 and severe C6-C7 degenerative disc disease. Moderate right C3-C4 facet arthropathy with moderate to severe left C2-C3 through C4-C5 facet arthropathy. Multilevel mild bilateral facet   arthropathy elsewhere.    CANAL/FORAMINA: Multilevel mild spinal canal stenosis with multilevel mild bilateral neural foraminal stenosis.    PARASPINAL: Pacemaker leads partially visualized.      Impression    IMPRESSION:  1.  No fracture or posttraumatic subluxation.  2.  Osteopenia with degenerative change as above. No high-grade spinal canal or neural foraminal stenosis.   XR Shoulder Right G/E 3 Views    Narrative    EXAM: XR SHOULDER RIGHT G/E 3 VIEWS  LOCATION: [facility]  DATE: 8/3/2024    INDICATION: fall, shoulder pain  COMPARISON: None.      Impression    IMPRESSION: Somewhat limited evaluation due to external artifact, osteopenia, and body habitus. No acute fracture or malalignment. Moderate glenohumeral and acromioclavicular joint degenerative changes. Cortical irregularity at the greater tuberosity   suggests chronic rotator cuff pathology. Central venous catheter.   XR Pelvis w Hip Right 1 View    Narrative    EXAM: XR PELVIS AND HIP RIGHT 1 VIEW  LOCATION: [facility]  DATE:  8/3/2024    INDICATION: hip pain, fall  COMPARISON: None.      Impression    IMPRESSION: Somewhat limited evaluation due to body habitus, positioning, and osteopenia. No acute fracture is identified. There is normal joint alignment. Moderate degenerative changes throughout the pelvis. Severe degenerative changes of the lower   lumbar spine. Partially imaged hardware in the right femoral diaphysis. Atherosclerosis.   CT Head w/o Contrast    Narrative    EXAM: CT HEAD W/O CONTRAST  LOCATION: [facility]  DATE: 8/6/2024    INDICATION: confusion  COMPARISON: Head CT 8/1/2024  TECHNIQUE: Routine CT Head without IV contrast. Multiplanar reformats. Dose reduction techniques were used.    FINDINGS:  INTRACRANIAL CONTENTS: No intracranial hemorrhage, extraaxial collection, or mass effect.  No CT evidence of acute infarct. Mild presumed chronic small vessel ischemic changes. Mild generalized volume loss. No hydrocephalus.     VISUALIZED ORBITS/SINUSES/MASTOIDS: No intraorbital abnormality. No paranasal sinus mucosal disease. No middle ear or mastoid effusion.    BONES/SOFT TISSUES: No acute abnormality.      Impression    IMPRESSION:  1.  No acute intracranial injury, hemorrhage, mass, or CT evidence of recent ischemia.     *Note: Due to a large number of results and/or encounters for the requested time period, some results have not been displayed. A complete set of results can be found in Results Review.       Discharge Medications   Discharge Medication List as of 8/7/2024  2:21 PM        START taking these medications    Details   cefdinir (OMNICEF) 300 MG capsule Take 1 capsule (300 mg) by mouth daily for 3 days, Transitional           CONTINUE these medications which have CHANGED    Details   acetaminophen (TYLENOL) 500 MG tablet Take 2 tablets (1,000 mg) by mouth every 8 hours as needed for mild pain, Transitional      oxyCODONE (ROXICODONE) 5 MG tablet Take 0.5 tablets (2.5 mg) by mouth every 8  hours as needed for severe pain, Disp-10 tablet, R-0, Local Print      torsemide (DEMADEX) 20 MG tablet Take 1 tablet (20 mg) by mouth 2 times daily, Transitional           CONTINUE these medications which have NOT CHANGED    Details   albuterol (PROAIR HFA/PROVENTIL HFA/VENTOLIN HFA) 108 (90 Base) MCG/ACT inhaler Inhale 2 puffs into the lungs every 6 hours as needed for shortness of breath, wheezing or cough For shortness of breath, HistoricalPharmacy may dispense brand covered by insurance (Proair, or proventil or ventolin or generic albuterol inhaler)      apixaban ANTICOAGULANT (ELIQUIS) 2.5 MG tablet Take 1 tablet (2.5 mg) by mouth 2 times daily, Disp-180 tablet, R-3, E-Prescribe      buPROPion (WELLBUTRIN SR) 100 MG 12 hr tablet Take 1 tablet (100 mg) by mouth daily for mood, Disp-90 tablet, R-3, E-Prescribe      calcium carbonate (TUMS) 500 MG chewable tablet Take 1 chew tab by mouth 2 times daily as needed for heartburn, Historical      Cholecalciferol (VITAMIN D) 125 MCG (5000 UT) capsule Take 1 tablet by mouth every evening, Historical      cyclobenzaprine (FLEXERIL) 5 MG tablet Take 1 tablet (5 mg) by mouth 3 times daily as needed for muscle spasms, Disp-10 tablet, R-0, Local Print      DULoxetine (CYMBALTA) 60 MG capsule Take 1 capsule (60 mg) by mouth daily, Disp-90 capsule, R-3, E-Prescribe      fexofenadine (ALLEGRA) 60 MG tablet Take 60 mg by mouth daily, Historical      fluticasone-salmeterol (WIXELA INHUB) 100-50 MCG/ACT inhaler USE 1 INHALATION BY MOUTH EVERY  12 HOURS, Disp-180 each, R-3, E-Prescribe      Menthol, Topical Analgesic, (ICY HOT MEDICATED SPRAY EX) Externally apply topically daily as needed (pain, in neck, back, hand), Historical      metoprolol tartrate (LOPRESSOR) 25 MG tablet Take 3 tablets (75 mg) by mouth 2 times daily, Disp-540 tablet, R-1, E-PrescribeDose is changed      miconazole (MICATIN) 2 % external cream Apply topically 2 times daily as needed for other  (rash/irritation)Historical      pantoprazole (PROTONIX) 20 MG EC tablet Take 1 tablet (20 mg) by mouth daily, Disp-90 tablet, R-3, E-Prescribe      triamcinolone (KENALOG) 0.1 % external cream Apply topically 2 times daily as needed for irritationHistorical      atorvastatin (LIPITOR) 20 MG tablet Take 1 tablet (20 mg) by mouth daily for cholesterol, Disp-100 tablet, R-3, E-Prescribe      blood glucose (ACCU-CHEK GUIDE) test strip Use to test blood sugar once daily or as directed., Disp-100 strip, R-3, E-Prescribe      blood glucose (ACCU-CHEK SOFTCLIX) lancing device Lancing device to be used with lancets., Disp-1 each, R-0, E-Prescribe      blood glucose monitoring (SOFTCLIX) lancets Use to test blood sugar once daily., Disp-100 each, R-3, E-Prescribe      nitroGLYcerin (NITROSTAT) 0.4 MG sublingual tablet FOR CHEST PAIN PLACE 1 TABLET UNDER THE TONGUE EVERY 5 MINUTES FOR 3 DOSES. IF SYMPTOMS PERSIST 5 MINUTES AFTER 1ST DOSE CALL 911, Disp-25 tablet, R-0, E-Prescribe           STOP taking these medications       cephALEXin (KEFLEX) 500 MG capsule Comments:   Reason for Stopping:         glimepiride (AMARYL) 2 MG tablet Comments:   Reason for Stopping:             Allergies   Allergies   Allergen Reactions    Aspirin Hives     Reaction occurred during childhood.     Lidocaine Itching    Lisinopril Cough    Losartan      Hyperkalemia      Metformin      Elevated lactic acid    Minocycline      Yellow Dye Allergy. Minocycline has Yellow Dye #10.    Mounjaro [Tirzepatide] Diarrhea and GI Disturbance    Salicylates Hives    Yellow Dye Hives     Rxn to yellow tablet. Eyes swelled shut.     Yellow Dyes (Non-Tartrazine) Hives

## 2024-08-12 ENCOUNTER — TRANSITIONAL CARE UNIT VISIT (OUTPATIENT)
Dept: GERIATRICS | Facility: CLINIC | Age: 89
End: 2024-08-12
Payer: COMMERCIAL

## 2024-08-12 VITALS
HEIGHT: 67 IN | SYSTOLIC BLOOD PRESSURE: 120 MMHG | TEMPERATURE: 98.9 F | HEART RATE: 72 BPM | RESPIRATION RATE: 18 BRPM | OXYGEN SATURATION: 100 % | DIASTOLIC BLOOD PRESSURE: 68 MMHG | BODY MASS INDEX: 38.33 KG/M2 | WEIGHT: 244.2 LBS

## 2024-08-12 DIAGNOSIS — I25.119 CORONARY ARTERY DISEASE INVOLVING NATIVE CORONARY ARTERY OF NATIVE HEART WITH ANGINA PECTORIS (H): ICD-10-CM

## 2024-08-12 DIAGNOSIS — S10.93XD CONTUSION OF FACE, SCALP AND NECK, SUBSEQUENT ENCOUNTER: ICD-10-CM

## 2024-08-12 DIAGNOSIS — M62.81 GENERALIZED MUSCLE WEAKNESS: ICD-10-CM

## 2024-08-12 DIAGNOSIS — K21.9 GASTROESOPHAGEAL REFLUX DISEASE WITHOUT ESOPHAGITIS: ICD-10-CM

## 2024-08-12 DIAGNOSIS — E78.5 HYPERLIPIDEMIA LDL GOAL <70: ICD-10-CM

## 2024-08-12 DIAGNOSIS — S00.03XD CONTUSION OF FACE, SCALP AND NECK, SUBSEQUENT ENCOUNTER: ICD-10-CM

## 2024-08-12 DIAGNOSIS — N18.32 STAGE 3B CHRONIC KIDNEY DISEASE (H): ICD-10-CM

## 2024-08-12 DIAGNOSIS — F32.1 MODERATE MAJOR DEPRESSION (H): ICD-10-CM

## 2024-08-12 DIAGNOSIS — G93.40 ENCEPHALOPATHY: ICD-10-CM

## 2024-08-12 DIAGNOSIS — R29.6 RECURRENT FALLS: Primary | ICD-10-CM

## 2024-08-12 DIAGNOSIS — I35.0 NONRHEUMATIC AORTIC VALVE STENOSIS: ICD-10-CM

## 2024-08-12 DIAGNOSIS — I10 ESSENTIAL HYPERTENSION: ICD-10-CM

## 2024-08-12 DIAGNOSIS — I50.32 CHRONIC HEART FAILURE WITH PRESERVED EJECTION FRACTION (H): ICD-10-CM

## 2024-08-12 DIAGNOSIS — N30.00 ACUTE CYSTITIS WITHOUT HEMATURIA: ICD-10-CM

## 2024-08-12 DIAGNOSIS — E11.21 TYPE 2 DIABETES MELLITUS WITH DIABETIC NEPHROPATHY, WITHOUT LONG-TERM CURRENT USE OF INSULIN (H): ICD-10-CM

## 2024-08-12 DIAGNOSIS — S00.83XD CONTUSION OF FACE, SCALP AND NECK, SUBSEQUENT ENCOUNTER: ICD-10-CM

## 2024-08-12 PROCEDURE — 99309 SBSQ NF CARE MODERATE MDM 30: CPT | Performed by: PHYSICIAN ASSISTANT

## 2024-08-12 NOTE — PROGRESS NOTES
"[facility] GERIATRICS    Chief Complaint   Patient presents with    RECHECK     HPI:  Moira Andre is a 89 year old  (9/24/1933), who is being seen today for an episodic care visit at: [facility] (TCU) [[id number]].     Summary: 89yo female with PMHx HFpEF, CKD, HTN, afib on chronic AC with apixaban, DMII, recent fall with UTI who was admitted at [facility] from 8/3 - 8/7, 2024 after presenting following recurrent falls with generalized weakness. Found to have ongoing UTI, concern for medication noncompliance. Treated for UTI, confusion improved. Referred to TCU for rehab, med management.     Today, pt is seen in follow-up. Sitting up in recliner at bedside, offers no complaints. Denies pain. Tolerating oral intake. No constipation. No diarrhea, urinary symptoms. Therapies are going well. Facility staff note pt appears to be in pain intermittently but when directly asked, denies.     On review of facility records, BPs ranging 120-148, HRs 71-76, BG values 123-194, weight 244.2#.    Allergies, and PMH/PSH reviewed in EPIC today.  REVIEW OF SYSTEMS:  4 point ROS including Respiratory, CV, GI and , other than that noted in the HPI,  is negative    Objective:   /68   Pulse 72   Temp 98.9  F (37.2  C)   Resp 18   Ht 1.702 m (5' 7\")   Wt 110.8 kg (244 lb 3.2 oz)   SpO2 100%   BMI 38.25 kg/m    GEN: well-developed, well-nourished, appears comfortable  HEENT: Normocephalic,ecchymosis to right frontal scalp and surrounding right orbit, EOM intact bilaterally, sclera clear, conjunctiva normal, nose & mouth patent, mucous membranes moist  CHEST: lungs CTA bilaterally, no increased work of breathing, no wheeze, crackles, rhonchi  HEART: RRR, S1 & S2  ABD: soft, nontender, nondistended, no guarding or rigidity  MSK: AROM bilateral UE/LE  NEURO: awake, alert. CN II-XII grossly intact. Sensation grossly intact to light touch.   SKIN: warm & dry without rash, no pedal edema    Recent labs in " EPIC reviewed by me today.  and Most Recent 3 CBC's:  Recent Labs   Lab Test 08/07/24  1234 08/06/24  0734 08/03/24  1327   WBC 8.0 7.7 10.1   HGB 12.8 12.2 12.8   MCV 85 87 87    207 239     Most Recent 3 BMP's:  Recent Labs   Lab Test 08/07/24  1322 08/07/24  1135 08/07/24  1128 08/07/24  0225 08/06/24  0748 08/06/24  0734 08/04/24  1622 08/04/24  0700   NA  --  137  --   --   --  137  --  139   POTASSIUM 5.0 5.6*  --   --   --  4.7  --  4.3   CHLORIDE  --  99  --   --   --  101  --  102   CO2  --  26  --   --   --  22  --  25   BUN  --  32.6*  --   --   --  34.2*  --  35.4*   CR  --  1.83*  --   --   --  1.84*  --  2.12*   ANIONGAP  --  12  --   --   --  14  --  12   TELLY  --  9.1  --   --   --  9.4  --  8.9   GLC  --  124* 125* 111*   < > 139*   < > 142*    < > = values in this interval not displayed.       Assessment/Plan:    UTI, E.coli  Generalized muscle weakness  Failed outpatient management. Completed course of antibiotics.  -PT/OT continuing  -SW for discharge planning     R knee pain  Facial contusions  2/2 recurrent falls. Evaluated by orthopedics while inpatient, imaging neg. Conservative management.  -change tylenol to 975mg TID  -Pain control with PRN oxycodone, flexeril     Encephalopathy, infectious, improving  CTH neg.  -Maintain sleep/wake cycle  -Supportive management     CKD-3b  Baseline Cr near 2.  -Monitor periodically     Afib s/p AVN with PPM  Chronic, stable.  -Continue apixaban, metoprolol     HFpEF  CAD   Aortic stenosis  Mitral stenosis  HTN / HLD  Chronic, stable.  -Continue torsemide, statin  -Monitor volume state     DMII  A1c 7.2% on 6/27/24. Diet controlled.  -Monitor BG trend     Depression  Chronic, stable.  -Continue bupropion, cymbalta     GERD  Chronic, stable.  -Continue protonix     Asthma, mild intermittent  Chronic, stable.  -Continue albuterol    MED REC REQUIRED  Post Medication Reconciliation Status: medication reconcilation previously completed during another  office visit      Electronically signed by: Mohan Gonzales PA-C

## 2024-08-14 DIAGNOSIS — R07.9 CHEST PAIN, UNSPECIFIED TYPE: ICD-10-CM

## 2024-08-14 RX ORDER — NITROGLYCERIN 0.4 MG/1
TABLET SUBLINGUAL
Qty: 25 TABLET | Refills: 0 | OUTPATIENT
Start: 2024-08-14

## 2024-08-14 NOTE — TELEPHONE ENCOUNTER
Clinic RN: Please contact patient because  SL NTG was just refilled for 25 tablets 1 week ago and requesting another refill.     Marie Monzon BSN, RN

## 2024-08-15 ENCOUNTER — PATIENT OUTREACH (OUTPATIENT)
Dept: CARE COORDINATION | Facility: CLINIC | Age: 89
End: 2024-08-15
Payer: COMMERCIAL

## 2024-08-15 NOTE — PROGRESS NOTES
Clinic Care Coordination Contact    The Community Health Worker planned to call for a Care Coordination outreach to follow up on goals and action steps.     Did Not Contact Patient.    Per Chart Review, patient is currently at   [facility] ([facility]) as of 8/7/24    BEATRIZ Velazquez  Clinic Care Coordination  [facility] Clin[facility]: [facility] [place], [place], [place], and [facility]  Phone: [phone]

## 2024-08-16 ENCOUNTER — DISCHARGE SUMMARY NURSING HOME (OUTPATIENT)
Dept: GERIATRICS | Facility: CLINIC | Age: 89
End: 2024-08-16
Payer: COMMERCIAL

## 2024-08-16 VITALS
RESPIRATION RATE: 18 BRPM | SYSTOLIC BLOOD PRESSURE: 144 MMHG | TEMPERATURE: 97.6 F | DIASTOLIC BLOOD PRESSURE: 83 MMHG | BODY MASS INDEX: 37.86 KG/M2 | HEART RATE: 69 BPM | OXYGEN SATURATION: 91 % | WEIGHT: 241.2 LBS | HEIGHT: 67 IN

## 2024-08-16 DIAGNOSIS — G93.40 ENCEPHALOPATHY: ICD-10-CM

## 2024-08-16 DIAGNOSIS — I10 ESSENTIAL HYPERTENSION: ICD-10-CM

## 2024-08-16 DIAGNOSIS — N30.00 ACUTE CYSTITIS WITHOUT HEMATURIA: ICD-10-CM

## 2024-08-16 DIAGNOSIS — I50.32 CHRONIC HEART FAILURE WITH PRESERVED EJECTION FRACTION (H): ICD-10-CM

## 2024-08-16 DIAGNOSIS — N18.32 STAGE 3B CHRONIC KIDNEY DISEASE (H): ICD-10-CM

## 2024-08-16 DIAGNOSIS — S00.03XD CONTUSION OF FACE, SCALP AND NECK, SUBSEQUENT ENCOUNTER: Primary | ICD-10-CM

## 2024-08-16 DIAGNOSIS — S00.83XD CONTUSION OF FACE, SCALP AND NECK, SUBSEQUENT ENCOUNTER: Primary | ICD-10-CM

## 2024-08-16 DIAGNOSIS — E78.5 HYPERLIPIDEMIA LDL GOAL <70: ICD-10-CM

## 2024-08-16 DIAGNOSIS — R29.6 RECURRENT FALLS: ICD-10-CM

## 2024-08-16 DIAGNOSIS — I25.119 CORONARY ARTERY DISEASE INVOLVING NATIVE CORONARY ARTERY OF NATIVE HEART WITH ANGINA PECTORIS (H): ICD-10-CM

## 2024-08-16 DIAGNOSIS — S10.93XD CONTUSION OF FACE, SCALP AND NECK, SUBSEQUENT ENCOUNTER: Primary | ICD-10-CM

## 2024-08-16 DIAGNOSIS — I50.30 HEART FAILURE WITH PRESERVED EJECTION FRACTION, NYHA CLASS I (H): ICD-10-CM

## 2024-08-16 DIAGNOSIS — I35.0 NONRHEUMATIC AORTIC VALVE STENOSIS: ICD-10-CM

## 2024-08-16 DIAGNOSIS — M62.81 GENERALIZED MUSCLE WEAKNESS: ICD-10-CM

## 2024-08-16 PROCEDURE — 99316 NF DSCHRG MGMT 30 MIN+: CPT | Performed by: PHYSICIAN ASSISTANT

## 2024-08-16 RX ORDER — ACETAMINOPHEN 325 MG/1
975 TABLET ORAL 3 TIMES DAILY
COMMUNITY
End: 2024-08-23

## 2024-08-16 NOTE — PROGRESS NOTES
[facility] GERIATRICS DISCHARGE SUMMARY  PATIENT'S NAME: Moira Andre  YOB: 1933  MEDICAL RECORD NUMBER:  [id number]  Place of Service where encounter took place:  [facility] ([facility]) [[id number]]    PRIMARY CARE PROVIDER AND CLINIC RESPONSIBLE AFTER TRANSFER:   Maryan Churchill MD, [address] / [place]                [place] [address]    [facility] Provider     Transferring providers: Mohan Goznales PA-C, Dr. Leann MD  Recent Hospitalization/ED:  [facility] Hospital stay 8/3/24 to 8/7/24.  Date of SNF Admission:  8/7/24  Date of SNF (anticipated) Discharge:  8/19/24  Discharged to: previous independent home  Cognitive Scores: SLUMS: 16/30 and CPT: 4.9/5.6  Physical Function: Ambulating 50 ft with FWW  DME: SNF  coordinating DME needs     CODE STATUS/ADVANCE DIRECTIVES DISCUSSION:  Prior   ALLERGIES: Aspirin, Lidocaine, Lisinopril, Losartan, Metformin, Minocycline, Mounjaro [tirzepatide], Salicylates, Yellow dye, and Yellow dyes (non-tartrazine)    NURSING FACILITY COURSE   Medication Changes/Rationale:   none    Summary of nursing facility stay:   89yo female with PMHx HFpEF, CKD, HTN, afib on chronic AC with apixaban, DMII, recent fall with UTI who was admitted at [facility] from 8/3 - 8/7, 2024 after presenting following recurrent falls with generalized weakness. Found to have ongoing UTI, concern for medication noncompliance. Treated for UTI, confusion improved. Referred to TCU for rehab, med management.     Progressed at TCU, walking up to 50 feet. Cleared to return to prior independent home. Denies pain on day of encounter. Tolerating oral intake, denies cp/sob, abdominal pain, constipation. The following were managed during TCU stay:    UTI, E.coli  Generalized muscle weakness  Failed outpatient management. Completed course of antibiotics.  -PT/OT continuing  -SW for discharge planning     R knee pain  Facial contusions  2/2 recurrent  falls. Evaluated by orthopedics while inpatient, imaging neg. Conservative management.  -Pain control with scheduled tylenol, PRN oxycodone, flexeril     Encephalopathy, infectious, improving  CTH neg. SLUMS 16/30 at TCU.  -Maintain sleep/wake cycle  -Supportive management     CKD-3b  Baseline Cr near 2.     Afib s/p AVN with PPM  Chronic, stable.  -Continue apixaban, metoprolol     HFpEF  CAD   Aortic stenosis  Mitral stenosis  HTN / HLD  Chronic, stable.  -Continue torsemide, statin     DMII  A1c 7.2% on 6/27/24. Diet controlled.  -Monitor BG trend     Depression  Chronic, stable.  -Continue bupropion, cymbalta     GERD  Chronic, stable.  -Continue protonix     Asthma, mild intermittent  Chronic, stable.  -Continue albuterol    Discharge Medications:  MED REC REQUIRED  Post Medication Reconciliation Status: medication reconcilation previously completed during another office visit     Current Outpatient Medications   Medication Sig Dispense Refill    acetaminophen (TYLENOL) 325 MG tablet Take 975 mg by mouth 3 times daily      albuterol (PROAIR HFA/PROVENTIL HFA/VENTOLIN HFA) 108 (90 Base) MCG/ACT inhaler Inhale 2 puffs into the lungs every 6 hours as needed for shortness of breath, wheezing or cough For shortness of breath      apixaban ANTICOAGULANT (ELIQUIS) 2.5 MG tablet Take 1 tablet (2.5 mg) by mouth 2 times daily 180 tablet 3    atorvastatin (LIPITOR) 20 MG tablet Take 1 tablet (20 mg) by mouth daily for cholesterol 100 tablet 3    blood glucose (ACCU-CHEK GUIDE) test strip Use to test blood sugar once daily or as directed. 100 strip 3    blood glucose (ACCU-CHEK SOFTCLIX) lancing device Lancing device to be used with lancets. 1 each 0    blood glucose monitoring (SOFTCLIX) lancets Use to test blood sugar once daily. 100 each 3    buPROPion (WELLBUTRIN SR) 100 MG 12 hr tablet Take 1 tablet (100 mg) by mouth daily for mood 90 tablet 3    calcium carbonate (TUMS) 500 MG chewable tablet Take 1 chew tab by mouth  2 times daily as needed for heartburn      Cholecalciferol (VITAMIN D) 125 MCG (5000 UT) capsule Take 1 tablet by mouth every evening      cyclobenzaprine (FLEXERIL) 5 MG tablet Take 1 tablet (5 mg) by mouth 3 times daily as needed for muscle spasms 10 tablet 0    DULoxetine (CYMBALTA) 60 MG capsule Take 1 capsule (60 mg) by mouth daily 90 capsule 3    fexofenadine (ALLEGRA) 60 MG tablet Take 60 mg by mouth daily      fluticasone-salmeterol (WIXELA INHUB) 100-50 MCG/ACT inhaler USE 1 INHALATION BY MOUTH EVERY  12 HOURS (Patient taking differently: Inhale 1 puff into the lungs daily USE 1 INHALATION BY MOUTH EVERY  12 HOURS) 180 each 3    Menthol, Topical Analgesic, (ICY HOT MEDICATED SPRAY EX) Externally apply topically daily as needed (pain, in neck, back, hand)      metoprolol tartrate (LOPRESSOR) 25 MG tablet Take 3 tablets (75 mg) by mouth 2 times daily 540 tablet 1    miconazole (MICATIN) 2 % external cream Apply topically 2 times daily as needed for other (rash/irritation)      nitroGLYcerin (NITROSTAT) 0.4 MG sublingual tablet FOR CHEST PAIN PLACE 1 TABLET UNDER THE TONGUE EVERY 5 MINUTES FOR 3 DOSES. IF SYMPTOMS PERSIST 5 MINUTES AFTER 1ST DOSE CALL 911 25 tablet 0    pantoprazole (PROTONIX) 20 MG EC tablet Take 1 tablet (20 mg) by mouth daily 90 tablet 3    torsemide (DEMADEX) 20 MG tablet Take 1 tablet (20 mg) by mouth 2 times daily      triamcinolone (KENALOG) 0.1 % external cream Apply topically 2 times daily as needed for irritation         Controlled medications:   Medication oxycodone electronically prescribed to home pharmacy     Past Medical History:   Past Medical History:   Diagnosis Date    Aortic valve sclerosis     heart murmur, no AS    Arrhythmia     PAT, PVC    Aspirin allergy     Plavix use long term    Asthma     CKD (chronic kidney disease) stage 3, GFR 30-59 ml/min (H)     x 2007 atleast    Congestive heart failure, unspecified     Depression     Diabetes mellitus (H) 2010    Diastolic  "dysfunction, left ventricle 2013    grade 2, nl ef    HTN (hypertension)     Lactic acidosis 08/2018    due to dehydration and metformin    Migraine headache     Mitral stenosis     mild, 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    Myocardial infarction (H) 9/2007, cath 2013 ml    BMS: stent to OM, diag, nl EF, echo /C angia 2013 , f/u cath no lesion >40%    Nephrolithiasis     right side    OA (osteoarthritis) of knee     Obesity     Rheumatoid arthritis flare (H)     prednisone    Sleep apnea     restarted using cpap 2017    TIA (transient ischaemic attack)     Ventral hernia, unspecified, without mention of obstruction or gangrene      Physical Exam:   Vitals: BP (!) 144/83   Pulse 69   Temp 97.6  F (36.4  C)   Resp 18   Ht 1.702 m (5' 7\")   Wt 109.4 kg (241 lb 3.2 oz)   SpO2 91%   BMI 37.78 kg/m    BMI: Body mass index is 37.78 kg/m .  GEN: well-developed, well-nourished, appears comfortable  HEENT: Normocephalic,ecchymosis to right frontal scalp and surrounding right orbit, EOM intact bilaterally, sclera clear, conjunctiva normal, nose & mouth patent, mucous membranes moist  CHEST: lungs CTA bilaterally, no increased work of breathing, no wheeze, crackles, rhonchi  HEART: RRR, S1 & S2  ABD: soft, nontender, nondistended, no guarding or rigidity  MSK: AROM bilateral UE/LE  NEURO: awake, alert. CN II-XII grossly intact. Sensation grossly intact to light touch.   SKIN: warm & dry without rash, no pedal edema    SNF labs: Recent labs in [facility] reviewed by me today.  and Most Recent 3 CBC's:  Recent Labs   Lab Test 08/07/24  1234 08/06/24  0734 08/03/24  1327   WBC 8.0 7.7 10.1   HGB 12.8 12.2 12.8   MCV 85 87 87    207 239     Most Recent 3 BMP's:  Recent Labs   Lab Test 08/07/24  1322 08/07/24  1135 08/07/24  1128 08/07/24  0225 08/06/24  0748 08/06/24  0734 08/04/24  1622 08/04/24  0700   NA  --  137  --   --   --  137  --  139   POTASSIUM 5.0 5.6*  --   --   --  4.7  --  4.3   CHLORIDE  --  99  --   --   --  101  --  102 "   CO2  --  26  --   --   --  22  --  25   BUN  --  32.6*  --   --   --  34.2*  --  35.4*   CR  --  1.83*  --   --   --  1.84*  --  2.12*   ANIONGAP  --  12  --   --   --  14  --  12   TELLY  --  9.1  --   --   --  9.4  --  8.9   GLC  --  124* 125* 111*   < > 139*   < > 142*    < > = values in this interval not displayed.       DISCHARGE PLAN:  Follow up labs: No labs orders/due  Medical Follow Up:      Follow up with primary care provider in 1-2 weeks  [facility] scheduled appointments:  Next 5 appointments (look out 90 days)      Aug 26, 2024 1:00 PM  (Arrive by 12:40 PM)  Annual Wellness Visit with Maryan Churchill MD  [facility] ([facility] ) [address]  [place] [address]  [phone]     Nov 01, 2024 2:00 PM  (Arrive by 1:40 PM)  Provider Visit with Maryan Churchill MD  [facility] ([facility] ) [address], [address]  [place] [address]  [phone]           Discharge Services: Home Care:  Occupational Therapy, Physical Therapy, Registered Nurse, and Home Health Aide  Discharge Instructions Verbalized to Patient at Discharge:   None    TOTAL DISCHARGE TIME:   Greater than 30min  Electronically signed by:  Mohan Gonzales PA-C     Documentation of Face to Face and Certification for Home Health Services    I certify that services are/were furnished while this patient was under the care of a physician and that a physician or an allowed non-physician practitioner (NPP), had a face-to-face encounter that meets the physician face-to-face encounter requirements. The encounter was in whole, or in part, related to the primary reason for home health. The patient is confined to his/her home and needs intermittent skilled nursing, physical therapy, speech-language pathology, or the continued need for occupational therapy. A plan of care has been established by a physician and is periodically reviewed  by a physician.  Date of Face-to-Face Encounter: 8/16/2024.    I certify that, based on my findings, the following services are medically necessary home health services: Nursing, Occupational Therapy, Physical Therapy, and Social Work.    My clinical findings support the need for the above skilled services because: Requires assistance of another person or specialized equipment to access medical services because patient: Range of motion limitations prevents ability to exit home safely...    Patient to re-establish plan of care with their PCP within 7-10 days after leaving the facility to reestablish care.  Medicare certified PECOS provider: Mohan Gonzales PA-C  Date: August 16, 2024

## 2024-08-17 RX ORDER — TORSEMIDE 20 MG/1
20 TABLET ORAL
Qty: 60 TABLET | Refills: 1 | Status: SHIPPED | OUTPATIENT
Start: 2024-08-17

## 2024-08-17 RX ORDER — OXYCODONE HYDROCHLORIDE 5 MG/1
2.5 TABLET ORAL EVERY 8 HOURS PRN
Qty: 12 TABLET | Refills: 0 | Status: ON HOLD | OUTPATIENT
Start: 2024-08-17 | End: 2024-08-28

## 2024-08-17 RX ORDER — CYCLOBENZAPRINE HCL 5 MG
5 TABLET ORAL 3 TIMES DAILY PRN
Qty: 30 TABLET | Refills: 0 | Status: ON HOLD | OUTPATIENT
Start: 2024-08-17 | End: 2024-08-28

## 2024-08-19 ENCOUNTER — PATIENT OUTREACH (OUTPATIENT)
Dept: CARE COORDINATION | Facility: CLINIC | Age: 89
End: 2024-08-19
Payer: COMMERCIAL

## 2024-08-22 DIAGNOSIS — Z09 HOSPITAL DISCHARGE FOLLOW-UP: ICD-10-CM

## 2024-08-23 ENCOUNTER — APPOINTMENT (OUTPATIENT)
Dept: CT IMAGING | Facility: CLINIC | Age: 89
End: 2024-08-23
Attending: PHYSICIAN ASSISTANT
Payer: COMMERCIAL

## 2024-08-23 ENCOUNTER — HOSPITAL ENCOUNTER (OUTPATIENT)
Facility: CLINIC | Age: 89
Setting detail: OBSERVATION
Discharge: NURSING, REHAB OR LONG-TERM CARE | End: 2024-08-28
Attending: PHYSICIAN ASSISTANT | Admitting: INTERNAL MEDICINE
Payer: COMMERCIAL

## 2024-08-23 ENCOUNTER — TELEPHONE (OUTPATIENT)
Dept: FAMILY MEDICINE | Facility: CLINIC | Age: 89
End: 2024-08-23

## 2024-08-23 ENCOUNTER — TELEPHONE (OUTPATIENT)
Dept: CASE MANAGEMENT | Facility: CLINIC | Age: 89
End: 2024-08-23
Payer: COMMERCIAL

## 2024-08-23 ENCOUNTER — TELEPHONE (OUTPATIENT)
Dept: FAMILY MEDICINE | Facility: CLINIC | Age: 89
End: 2024-08-23
Payer: COMMERCIAL

## 2024-08-23 DIAGNOSIS — M79.605 BILATERAL LEG PAIN: ICD-10-CM

## 2024-08-23 DIAGNOSIS — M79.604 BILATERAL LEG PAIN: ICD-10-CM

## 2024-08-23 DIAGNOSIS — R74.8 ELEVATED CK: ICD-10-CM

## 2024-08-23 DIAGNOSIS — I50.30 HEART FAILURE WITH PRESERVED EJECTION FRACTION, NYHA CLASS I (H): Primary | ICD-10-CM

## 2024-08-23 DIAGNOSIS — R23.9 ALTERATION IN SKIN INTEGRITY DUE TO MOISTURE: Primary | ICD-10-CM
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
## 2024-08-23 DIAGNOSIS — R29.6 RECURRENT FALLS: ICD-10-CM

## 2024-08-23 DIAGNOSIS — R53.1 GENERALIZED WEAKNESS: ICD-10-CM

## 2024-08-23 DIAGNOSIS — M54.50 LOW BACK PAIN: ICD-10-CM

## 2024-08-23 DIAGNOSIS — B37.2 CANDIDIASIS OF SKIN: ICD-10-CM

## 2024-08-23 DIAGNOSIS — R31.9 HEMATURIA, UNSPECIFIED TYPE: ICD-10-CM

## 2024-08-23 DIAGNOSIS — S09.90XA CLOSED HEAD INJURY, INITIAL ENCOUNTER: ICD-10-CM

## 2024-08-23 DIAGNOSIS — M25.551 HIP PAIN, RIGHT: ICD-10-CM

## 2024-08-23 LAB
ALBUMIN SERPL BCG-MCNC: 4.1 G/DL (ref 3.5–5.2)
ALBUMIN UR-MCNC: 50 MG/DL
ALP SERPL-CCNC: 80 U/L (ref 40–150)
ALT SERPL W P-5'-P-CCNC: 8 U/L (ref 0–50)
AMORPH CRY #/AREA URNS HPF: ABNORMAL /HPF
ANION GAP SERPL CALCULATED.3IONS-SCNC: 17 MMOL/L (ref 7–15)
APPEARANCE UR: ABNORMAL
AST SERPL W P-5'-P-CCNC: 14 U/L (ref 0–45)
ATRIAL RATE - MUSE: 64 BPM
BACTERIA #/AREA URNS HPF: ABNORMAL /HPF
BASOPHILS # BLD AUTO: 0 10E3/UL (ref 0–0.2)
BASOPHILS NFR BLD AUTO: 0 %
BILIRUB SERPL-MCNC: 0.6 MG/DL
BILIRUB UR QL STRIP: NEGATIVE
BUN SERPL-MCNC: 51.3 MG/DL (ref 8–23)
CALCIUM SERPL-MCNC: 10.4 MG/DL (ref 8.8–10.4)
CHLORIDE SERPL-SCNC: 99 MMOL/L (ref 98–107)
CK SERPL-CCNC: 196 U/L (ref 26–192)
COLOR UR AUTO: YELLOW
CREAT SERPL-MCNC: 2.08 MG/DL (ref 0.51–0.95)
DIASTOLIC BLOOD PRESSURE - MUSE: NORMAL MMHG
EGFRCR SERPLBLD CKD-EPI 2021: 22 ML/MIN/1.73M2
EOSINOPHIL # BLD AUTO: 0.1 10E3/UL (ref 0–0.7)
EOSINOPHIL NFR BLD AUTO: 1 %
ERYTHROCYTE [DISTWIDTH] IN BLOOD BY AUTOMATED COUNT: 16.1 % (ref 10–15)
GLUCOSE BLDC GLUCOMTR-MCNC: 159 MG/DL (ref 70–99)
GLUCOSE SERPL-MCNC: 191 MG/DL (ref 70–99)
GLUCOSE UR STRIP-MCNC: NEGATIVE MG/DL
HCO3 SERPL-SCNC: 24 MMOL/L (ref 22–29)
HCT VFR BLD AUTO: 41.3 % (ref 35–47)
HGB BLD-MCNC: 12.9 G/DL (ref 11.7–15.7)
HGB UR QL STRIP: ABNORMAL
HYALINE CASTS: 4 /LPF
IMM GRANULOCYTES # BLD: 0.1 10E3/UL
IMM GRANULOCYTES NFR BLD: 1 %
INTERPRETATION ECG - MUSE: NORMAL
KETONES UR STRIP-MCNC: NEGATIVE MG/DL
LEUKOCYTE ESTERASE UR QL STRIP: ABNORMAL
LYMPHOCYTES # BLD AUTO: 1.2 10E3/UL (ref 0.8–5.3)
LYMPHOCYTES NFR BLD AUTO: 11 %
MAGNESIUM SERPL-MCNC: 2.2 MG/DL (ref 1.7–2.3)
MCH RBC QN AUTO: 26.3 PG (ref 26.5–33)
MCHC RBC AUTO-ENTMCNC: 31.2 G/DL (ref 31.5–36.5)
MCV RBC AUTO: 84 FL (ref 78–100)
MONOCYTES # BLD AUTO: 1 10E3/UL (ref 0–1.3)
MONOCYTES NFR BLD AUTO: 9 %
NEUTROPHILS # BLD AUTO: 8.7 10E3/UL (ref 1.6–8.3)
NEUTROPHILS NFR BLD AUTO: 79 %
NITRATE UR QL: NEGATIVE
NRBC # BLD AUTO: 0 10E3/UL
NRBC BLD AUTO-RTO: 0 /100
P AXIS - MUSE: NORMAL DEGREES
PH UR STRIP: 5 [PH] (ref 5–7)
PLATELET # BLD AUTO: 369 10E3/UL (ref 150–450)
POTASSIUM SERPL-SCNC: 4.3 MMOL/L (ref 3.4–5.3)
PR INTERVAL - MUSE: NORMAL MS
PROT SERPL-MCNC: 8.1 G/DL (ref 6.4–8.3)
QRS DURATION - MUSE: 154 MS
QT - MUSE: 444 MS
QTC - MUSE: 482 MS
R AXIS - MUSE: 75 DEGREES
RBC # BLD AUTO: 4.91 10E6/UL (ref 3.8–5.2)
RBC URINE: >182 /HPF
SARS-COV-2 RNA RESP QL NAA+PROBE: NEGATIVE
SODIUM SERPL-SCNC: 140 MMOL/L (ref 135–145)
SP GR UR STRIP: 1.02 (ref 1–1.03)
SQUAMOUS EPITHELIAL: 2 /HPF
SYSTOLIC BLOOD PRESSURE - MUSE: NORMAL MMHG
T AXIS - MUSE: 252 DEGREES
TSH SERPL DL<=0.005 MIU/L-ACNC: 2.01 UIU/ML (ref 0.3–4.2)
UROBILINOGEN UR STRIP-MCNC: NORMAL MG/DL
VENTRICULAR RATE- MUSE: 71 BPM
WBC # BLD AUTO: 11 10E3/UL (ref 4–11)
WBC URINE: 12 /HPF

## 2024-08-23 PROCEDURE — 72125 CT NECK SPINE W/O DYE: CPT

## 2024-08-23 PROCEDURE — 80053 COMPREHEN METABOLIC PANEL: CPT | Performed by: PHYSICIAN ASSISTANT

## 2024-08-23 PROCEDURE — 81001 URINALYSIS AUTO W/SCOPE: CPT | Performed by: PHYSICIAN ASSISTANT

## 2024-08-23 PROCEDURE — 96376 TX/PRO/DX INJ SAME DRUG ADON: CPT

## 2024-08-23 PROCEDURE — 250N000013 HC RX MED GY IP 250 OP 250 PS 637: Performed by: PHYSICIAN ASSISTANT

## 2024-08-23 PROCEDURE — G0378 HOSPITAL OBSERVATION PER HR: HCPCS

## 2024-08-23 PROCEDURE — 70450 CT HEAD/BRAIN W/O DYE: CPT

## 2024-08-23 PROCEDURE — 93005 ELECTROCARDIOGRAM TRACING: CPT

## 2024-08-23 PROCEDURE — 72192 CT PELVIS W/O DYE: CPT

## 2024-08-23 PROCEDURE — 258N000003 HC RX IP 258 OP 636: Performed by: PHYSICIAN ASSISTANT

## 2024-08-23 PROCEDURE — 36415 COLL VENOUS BLD VENIPUNCTURE: CPT | Performed by: PHYSICIAN ASSISTANT

## 2024-08-23 PROCEDURE — 99285 EMERGENCY DEPT VISIT HI MDM: CPT | Mod: 25

## 2024-08-23 PROCEDURE — 84443 ASSAY THYROID STIM HORMONE: CPT | Performed by: PHYSICIAN ASSISTANT

## 2024-08-23 PROCEDURE — 87086 URINE CULTURE/COLONY COUNT: CPT | Performed by: PHYSICIAN ASSISTANT

## 2024-08-23 PROCEDURE — 82962 GLUCOSE BLOOD TEST: CPT

## 2024-08-23 PROCEDURE — 72131 CT LUMBAR SPINE W/O DYE: CPT

## 2024-08-23 PROCEDURE — 250N000013 HC RX MED GY IP 250 OP 250 PS 637: Performed by: INTERNAL MEDICINE

## 2024-08-23 PROCEDURE — 99222 1ST HOSP IP/OBS MODERATE 55: CPT | Performed by: INTERNAL MEDICINE

## 2024-08-23 PROCEDURE — 250N000011 HC RX IP 250 OP 636: Performed by: PHYSICIAN ASSISTANT

## 2024-08-23 PROCEDURE — 82550 ASSAY OF CK (CPK): CPT | Performed by: PHYSICIAN ASSISTANT

## 2024-08-23 PROCEDURE — 85041 AUTOMATED RBC COUNT: CPT | Performed by: PHYSICIAN ASSISTANT

## 2024-08-23 PROCEDURE — 83735 ASSAY OF MAGNESIUM: CPT | Performed by: PHYSICIAN ASSISTANT

## 2024-08-23 PROCEDURE — 87635 SARS-COV-2 COVID-19 AMP PRB: CPT | Performed by: PHYSICIAN ASSISTANT

## 2024-08-23 RX ORDER — NALOXONE HYDROCHLORIDE 0.4 MG/ML
0.2 INJECTION, SOLUTION INTRAMUSCULAR; INTRAVENOUS; SUBCUTANEOUS
Status: DISCONTINUED | OUTPATIENT
Start: 2024-08-23 | End: 2024-08-28 | Stop reason: HOSPADM

## 2024-08-23 RX ORDER — NALOXONE HYDROCHLORIDE 0.4 MG/ML
0.4 INJECTION, SOLUTION INTRAMUSCULAR; INTRAVENOUS; SUBCUTANEOUS
Status: DISCONTINUED | OUTPATIENT
Start: 2024-08-23 | End: 2024-08-28 | Stop reason: HOSPADM

## 2024-08-23 RX ORDER — FLUTICASONE FUROATE AND VILANTEROL 100; 25 UG/1; UG/1
1 POWDER RESPIRATORY (INHALATION) DAILY
Status: DISCONTINUED | OUTPATIENT
Start: 2024-08-24 | End: 2024-08-28 | Stop reason: HOSPADM

## 2024-08-23 RX ORDER — NICOTINE POLACRILEX 4 MG
15-30 LOZENGE BUCCAL
Status: DISCONTINUED | OUTPATIENT
Start: 2024-08-23 | End: 2024-08-28 | Stop reason: HOSPADM

## 2024-08-23 RX ORDER — ONDANSETRON 4 MG/1
4 TABLET, ORALLY DISINTEGRATING ORAL EVERY 6 HOURS PRN
Status: DISCONTINUED | OUTPATIENT
Start: 2024-08-23 | End: 2024-08-28 | Stop reason: HOSPADM

## 2024-08-23 RX ORDER — PANTOPRAZOLE SODIUM 20 MG/1
20 TABLET, DELAYED RELEASE ORAL DAILY
Status: DISCONTINUED | OUTPATIENT
Start: 2024-08-24 | End: 2024-08-28 | Stop reason: HOSPADM

## 2024-08-23 RX ORDER — DULOXETIN HYDROCHLORIDE 60 MG/1
60 CAPSULE, DELAYED RELEASE ORAL DAILY
Status: DISCONTINUED | OUTPATIENT
Start: 2024-08-24 | End: 2024-08-28 | Stop reason: HOSPADM

## 2024-08-23 RX ORDER — TORSEMIDE 20 MG/1
20 TABLET ORAL
Status: DISCONTINUED | OUTPATIENT
Start: 2024-08-24 | End: 2024-08-28 | Stop reason: HOSPADM

## 2024-08-23 RX ORDER — CEFTRIAXONE 2 G/1
2 INJECTION, POWDER, FOR SOLUTION INTRAMUSCULAR; INTRAVENOUS ONCE
Status: COMPLETED | OUTPATIENT
Start: 2024-08-23 | End: 2024-08-23

## 2024-08-23 RX ORDER — DEXTROSE MONOHYDRATE 25 G/50ML
25-50 INJECTION, SOLUTION INTRAVENOUS
Status: DISCONTINUED | OUTPATIENT
Start: 2024-08-23 | End: 2024-08-28 | Stop reason: HOSPADM

## 2024-08-23 RX ORDER — CYCLOBENZAPRINE HCL 5 MG
5 TABLET ORAL 3 TIMES DAILY PRN
Status: DISCONTINUED | OUTPATIENT
Start: 2024-08-23 | End: 2024-08-26

## 2024-08-23 RX ORDER — ACETAMINOPHEN 500 MG
500-1000 TABLET ORAL 3 TIMES DAILY
Status: ON HOLD | COMMUNITY
End: 2024-08-28

## 2024-08-23 RX ORDER — AMOXICILLIN 250 MG
1 CAPSULE ORAL 2 TIMES DAILY PRN
Status: DISCONTINUED | OUTPATIENT
Start: 2024-08-23 | End: 2024-08-28 | Stop reason: HOSPADM

## 2024-08-23 RX ORDER — ACETAMINOPHEN 325 MG/1
650 TABLET ORAL ONCE
Status: COMPLETED | OUTPATIENT
Start: 2024-08-23 | End: 2024-08-23

## 2024-08-23 RX ORDER — ONDANSETRON 2 MG/ML
4 INJECTION INTRAMUSCULAR; INTRAVENOUS EVERY 6 HOURS PRN
Status: DISCONTINUED | OUTPATIENT
Start: 2024-08-23 | End: 2024-08-28 | Stop reason: HOSPADM

## 2024-08-23 RX ORDER — AMOXICILLIN 250 MG
2 CAPSULE ORAL 2 TIMES DAILY PRN
Status: DISCONTINUED | OUTPATIENT
Start: 2024-08-23 | End: 2024-08-28 | Stop reason: HOSPADM

## 2024-08-23 RX ORDER — ACETAMINOPHEN 500 MG
1000 TABLET ORAL 3 TIMES DAILY
Status: DISCONTINUED | OUTPATIENT
Start: 2024-08-23 | End: 2024-08-28 | Stop reason: HOSPADM

## 2024-08-23 RX ORDER — BUPROPION HYDROCHLORIDE 100 MG/1
100 TABLET, EXTENDED RELEASE ORAL DAILY
Status: DISCONTINUED | OUTPATIENT
Start: 2024-08-24 | End: 2024-08-28 | Stop reason: HOSPADM

## 2024-08-23 RX ADMIN — SODIUM CHLORIDE 1000 ML: 9 INJECTION, SOLUTION INTRAVENOUS at 16:32

## 2024-08-23 RX ADMIN — OXYCODONE HYDROCHLORIDE 2.5 MG: 5 TABLET ORAL at 16:18

## 2024-08-23 RX ADMIN — ACETAMINOPHEN 1000 MG: 500 TABLET, FILM COATED ORAL at 22:27

## 2024-08-23 RX ADMIN — METOPROLOL TARTRATE 75 MG: 50 TABLET, FILM COATED ORAL at 22:27

## 2024-08-23 RX ADMIN — APIXABAN 2.5 MG: 2.5 TABLET, FILM COATED ORAL at 22:27

## 2024-08-23 RX ADMIN — ACETAMINOPHEN 325MG 650 MG: 325 TABLET ORAL at 16:18

## 2024-08-23 RX ADMIN — CEFTRIAXONE SODIUM 2 G: 2 INJECTION, POWDER, FOR SOLUTION INTRAMUSCULAR; INTRAVENOUS at 20:04

## 2024-08-23 ASSESSMENT — ACTIVITIES OF DAILY LIVING (ADL)
ADLS_ACUITY_SCORE: 40
ADLS_ACUITY_SCORE: 49
ADLS_ACUITY_SCORE: 40

## 2024-08-23 ASSESSMENT — COLUMBIA-SUICIDE SEVERITY RATING SCALE - C-SSRS
3. HAVE YOU BEEN THINKING ABOUT HOW YOU MIGHT KILL YOURSELF?: NO
4. HAVE YOU HAD THESE THOUGHTS AND HAD SOME INTENTION OF ACTING ON THEM?: NO
6. HAVE YOU EVER DONE ANYTHING, STARTED TO DO ANYTHING, OR PREPARED TO DO ANYTHING TO END YOUR LIFE?: NO
5. HAVE YOU STARTED TO WORK OUT OR WORKED OUT THE DETAILS OF HOW TO KILL YOURSELF? DO YOU INTEND TO CARRY OUT THIS PLAN?: NO
1. IN THE PAST MONTH, HAVE YOU WISHED YOU WERE DEAD OR WISHED YOU COULD GO TO SLEEP AND NOT WAKE UP?: YES
2. HAVE YOU ACTUALLY HAD ANY THOUGHTS OF KILLING YOURSELF IN THE PAST MONTH?: YES

## 2024-08-23 NOTE — ED PROVIDER NOTES
Emergency Department Note      History of Present Illness     Chief Complaint   Fall and Leg Pain      HPI   Moira Andre is a 89 year old female on [facility] with a history of CKD, CHF, diabetes mellitus, MI, and TIA, who presents to the ED for evaluation after a fall, head injury, back pain, leg pain. The patient was hospitalized here 8/3/24-8/7/24 for a UTI, and went to [place] for rehab afterwards. She left [place] on Monday (8/19/24) and is now living independently. She is supposed to get in-home care, but is currently between services, so she hasn't received any help since getting home from [place]. Since getting home she has had 2 falls. This morning when she fell, she states she got up, took a step, and her legs gave out. She fell and hit her head, but denies any loss of consciousness. Family notes that her ability to ambulate has changed recently, even with the aid of a walker. She complains of pain to her lower back, knees, ankles, shoulders. Cyclobenzaprine provides little relief. The patient also complains of rash w/itchiness to her groin, which she has powder to put on. She denies pain in her abdomen or near her hernia. No chest pain, palpitations, dizziness, sob, urinary symptoms, vomiting or diarrhea.  Denies numbness in torso, face, or extremities.  She denies any fever or cough. Of note, the patient recently ran out of her oxycodone, which she had prescribed to be used as needed.     Independent Historian   Family present at bedside to provide partial history.     Review of External Notes   I reviewed the discharge summary by Dr. Grimaldo from 8/7/24.    Past Medical History     Medical History and Problem List   Aortic valve sclerosis  Arrhythmia  Aspirin allergy  Asthma  CKD   Congestive heart failure, unspecified  Depression  Diabetes mellitus   Diastolic dysfunction, left ventricle  Hypertension   Lactic acidosis  Migraine headache  Mitral stenosis  Myocardial infarction  "  Nephrolithiasis  osteoarthritis of knee   Obesity  Rheumatoid arthritis flare   Sleep apnea  TIA (transient ischaemic attack)  Ventral hernia, unspecified, without mention of obstruction or gangrene     Medications   Albuterol    Apixaban    Atorvastatin    Bupropion      cephalexin   Cholecalciferol   Duloxetine   Hydralazine   fluticasone-salmeterol   glimepiride   Losartan   metoprolol tartrate  nitroglycerin  pantoprazole   torsemide      Surgical History   Appendectomy  Biopsy breast  Cholecystectomy   Coronary angiography x2  Ablation av node  Pacemaker  Left heart catheterization   Hysterectomy total abdominal  Knee replacement, right and left  Release carpal tunnel, bilateral  Right femoral artery pseudoaneurysm      Physical Exam     Patient Vitals for the past 24 hrs:   BP Temp Temp src Pulse Resp SpO2 Height Weight   08/23/24 2000 135/64 -- -- 70 -- 93 % -- --   08/23/24 1856 -- -- -- -- -- 91 % -- --   08/23/24 1855 -- -- -- -- -- (!) 82 % -- --   08/23/24 1853 -- -- -- -- -- (!) 89 % -- --   08/23/24 1852 -- -- -- -- -- (!) 71 % -- --   08/23/24 1830 (!) 143/82 -- -- 72 -- -- -- --   08/23/24 1800 (!) 145/67 -- -- 67 -- 93 % -- --   08/23/24 1554 (!) 163/83 -- -- 78 -- 93 % -- --   08/23/24 1326 (!) 145/56 98.4  F (36.9  C) Temporal 72 20 91 % 1.702 m (5' 7\") 109.8 kg (242 lb)     Physical Exam  General: Alert and awake. Tearful. Smells or urine.  Head:  Scalp w/frontal hematoma on L.  Otherwise NC/AT w/out bruising or depressions.  Eyes:  Globes normal and atraumatic.  PERRL, EOMI   ENT:  No bruising of facial bone ttp or step-offs.    TM's normal bilaterally    Oropharynx atraumatic.  Neck:  Normal range of motion without rigidity. Able to rotate 45 degrees BL. No bruising or swelling.  CV:  Regular rate and rhythm. No M/R/G.  Resp:  Breath sounds are clear bilaterally. Normal effort.  Abdomen: Abdomen is soft, no distension, no tenderness, no masses.  MS:  1+ BL pitting LE edema.  (Baseline per pt " and daughter) No midline cervical, thoracic ttp.  TTP to lumbar spine at multiple pts.  No step-offs.    No tenderness over sternum, scapula, ribs, clavicles.    Extremities atraumatic.  PROM of all other major joints performed and unremarkable. No pain w/passive ROM of BL hips or axial loading.  No bony ttp.  Skin:  Candidal rash w/satellite lesions to skin fold RL abdomen consistent w/skin candidiasis.  No ttp fluctuance or crepitus/blisters.  Warm and dry, No bruising.  Extremities warm and well perfused 2+ DP and PT pulses BL.  Neuro:  Alert and oriented.  Strength and sensation grossly intact in all 4 extremities.  Cranial nerves  2-12 intact. GCS: 15. Gait normal.  Psych:  Alert.  Normal affect.  Cooperative.      Diagnostics     Lab Results   Labs Ordered and Resulted from Time of ED Arrival to Time of ED Departure   COMPREHENSIVE METABOLIC PANEL - Abnormal       Result Value    Sodium 140      Potassium 4.3      Carbon Dioxide (CO2) 24      Anion Gap 17 (*)     Urea Nitrogen 51.3 (*)     Creatinine 2.08 (*)     GFR Estimate 22 (*)     Calcium 10.4      Chloride 99      Glucose 191 (*)     Alkaline Phosphatase 80      AST 14      ALT 8      Protein Total 8.1      Albumin 4.1      Bilirubin Total 0.6     ROUTINE UA WITH MICROSCOPIC REFLEX TO CULTURE - Abnormal    Color Urine Yellow      Appearance Urine Slightly Cloudy (*)     Glucose Urine Negative      Bilirubin Urine Negative      Ketones Urine Negative      Specific Gravity Urine 1.019      Blood Urine Large (*)     pH Urine 5.0      Protein Albumin Urine 50 (*)     Urobilinogen Urine Normal      Nitrite Urine Negative      Leukocyte Esterase Urine Small (*)     Bacteria Urine Few (*)     Amorphous Crystals Urine Few (*)     RBC Urine >182 (*)     WBC Urine 12 (*)     Squamous Epithelials Urine 2 (*)     Hyaline Casts Urine 4 (*)    CK TOTAL - Abnormal     (*)    CBC WITH PLATELETS AND DIFFERENTIAL - Abnormal    WBC Count 11.0      RBC Count 4.91       Hemoglobin 12.9      Hematocrit 41.3      MCV 84      MCH 26.3 (*)     MCHC 31.2 (*)     RDW 16.1 (*)     Platelet Count 369      % Neutrophils 79      % Lymphocytes 11      % Monocytes 9      % Eosinophils 1      % Basophils 0      % Immature Granulocytes 1      NRBCs per 100 WBC 0      Absolute Neutrophils 8.7 (*)     Absolute Lymphocytes 1.2      Absolute Monocytes 1.0      Absolute Eosinophils 0.1      Absolute Basophils 0.0      Absolute Immature Granulocytes 0.1      Absolute NRBCs 0.0     MAGNESIUM - Normal    Magnesium 2.2     TSH WITH FREE T4 REFLEX - Normal    TSH 2.01     COVID-19 VIRUS (CORONAVIRUS) BY PCR - Normal    SARS CoV2 PCR Negative     URINE CULTURE       Imaging   CT Pelvis Bone wo Contrast   Final Result   IMPRESSION:   1.  No acute fracture is identified. Given the degree of osseous demineralization, MRI would be more sensitive for nondisplaced fracture and should be considered if there is persistent clinical concern.   2.  Moderate to advanced right hip degenerative arthrosis. There is right hip joint effusion.   3.  Additional chronic-appearing findings, as described.      CT Lumbar Spine w/o Contrast   Final Result   IMPRESSION:   1.  No fracture or posttraumatic subluxation.   2.  Multilevel high-grade spinal canal and foraminal stenosis.   3.  Right kidney stone, measures 9 mm. No hydronephrosis. Moderate surrounding chronic fat stranding unchanged from prior CT of 11/26/2023, may be secondary to chronic urinary infection.      CT Cervical Spine w/o Contrast   Final Result   IMPRESSION:   1.  No fracture or posttraumatic subluxation.         Head CT w/o contrast   Final Result   IMPRESSION:   1.  No CT evidence for acute intracranial process.   2.  Brain atrophy and presumed chronic microvascular ischemic changes as above.          EKG   ECG results from 08/23/24   EKG 12 lead     Value    Systolic Blood Pressure     Diastolic Blood Pressure     Ventricular Rate 71    Atrial Rate 64     WI Interval     QRS Duration 154        QTc 482    P Axis     R AXIS 75    T Axis 252    Interpretation ECG      Ventricular-paced rhythm  Abnormal ECG  Interpreted at 1622.        *Note: Due to a large number of results and/or encounters for the requested time period, some results have not been displayed. A complete set of results can be found in Results Review.       Independent Interpretation   Independently reviewed head CT and note no bleed, mass or skull fx.    ED Course      Medications Administered   Medications   cefTRIAXone (ROCEPHIN) 2 g vial to attach to  ml bag for ADULTS or NS 50 ml bag for PEDS (2 g Intravenous $New Bag 8/23/24 2004)   sodium chloride 0.9% BOLUS 1,000 mL (1,000 mLs Intravenous $New Bag 8/23/24 1632)   acetaminophen (TYLENOL) tablet 650 mg (650 mg Oral $Given 8/23/24 1618)   oxyCODONE IR (ROXICODONE) half-tab 2.5 mg (2.5 mg Oral $Given 8/23/24 1618)       Procedures   Procedures     Discussion of Management   Admitting Hospitalist, Dr. Oquendo who agrees to accept the patient for observation    ED Course   ED Course as of 08/23/24 2016   Fri Aug 23, 2024   1550 I obtained the history and examined the patient as above.    1625 I consulted with the pharmacist regarding dosing.        Additional Documentation  Transportation and Social Connections/Isolation  Pt lives alone cannot drive complicating care.  Medical Decision Making / Diagnosis     CMS Diagnoses: None    MIPS       None    MDM   Moira Andre is a 89 year old female who presents for evaluation of weakness multiple falls low back and leg pain.  Broad differential considered.  She is on Eliquis and did hit her head.  From a trauma standpoint CT scans of her head cervical spine and lumbar and pelvis showed no evidence of acute traumatic abnormality.  Exam of her lower extremities is not suggestive of fracture of those joints and it seems to be more of a diffuse mild ache I do not feel imaging of the femurs knees  ankles shins or feet is indicated at this time.  CMS is intact.  There is no evidence of compartment syndrome.  Her labs are notable for slightly elevated CK and creatinine may be a little bit of degree of dehydration but not elevation consistent with rhabdo myelosis.  She does also have some hematuria of unclear etiology.  CT does show a stone in the kidney but there is no hydronephrosis.  She does have a little bit of white cells still in her urine and given her recent UTIs and increased weakness reasonable to cover her with antibiotics.  Her white count is very slightly elevated but she is afebrile vitally stable not septic.  I do not think emergency urologic consult etc. indicated at this time.  Nothing to suggest cardiopulmonary emergency and her EKG shows no evidence of acute ischemia or arrhythmia. She has a rash consistent w/skin candiasis but does not appear to show cellulitis/skin infection.  Is already using powder. She has no focal neurologic deficits to suggest stroke or spinal cord lesion and I do not think there is any indication for emergent MRI.  The patient is weak lives alone has fallen multiple times and is not able to get around.  Additionally given her hematuria and mild lab abnormalities we will bring her into the hospital for observation.  Discussed with hospitalist who agrees to accept.    Disposition   The patient was admitted to the hospital.     Diagnosis     ICD-10-CM    1. Fall, initial encounter  W19.XXXA       2. Generalized weakness  R53.1       3. Hematuria, unspecified type  R31.9     with pyuria      4. Closed head injury, initial encounter  S09.90XA       5. Candidiasis of skin  B37.2       6. Low back pain  M54.50       7. Bilateral leg pain  M79.604     M79.605       8. Elevated CK  R74.8     mild           Discharge Medications   New Prescriptions    No medications on file         Scribe Disclosure:  Apple MCKENNA, am serving as a scribe at 4:06 PM on 8/23/2024 to document  services personally performed by Sha Medrano PA-C based on my observations and the provider's statements to me.        Sha Medrano PA-C  08/23/24 2019       Sha Medrano PA-C  08/24/24 0132

## 2024-08-23 NOTE — PHARMACY-ADMISSION MEDICATION HISTORY
Pharmacist Admission Medication History    Admission medication history is complete. The information provided in this note is only as accurate as the sources available at the time of the update.    Information Source(s): Patient and CareEverywhere/SureScripts via in-person    Pertinent Information:     Changes made to PTA medication list:  Added: None  Deleted: None  Changed: None    Allergies reviewed with patient and updates made in EHR: no    Medication History Completed By: Akil Aliheyder, RP 8/23/2024 5:24 PM    PTA Med List   Medication Sig Last Dose    acetaminophen (TYLENOL) 500 MG tablet Take 500-1,000 mg by mouth 3 times daily. 8/22/2024    albuterol (PROAIR HFA/PROVENTIL HFA/VENTOLIN HFA) 108 (90 Base) MCG/ACT inhaler Inhale 2 puffs into the lungs every 6 hours as needed for shortness of breath, wheezing or cough For shortness of breath     apixaban ANTICOAGULANT (ELIQUIS) 2.5 MG tablet Take 1 tablet (2.5 mg) by mouth 2 times daily 8/22/2024    atorvastatin (LIPITOR) 20 MG tablet Take 1 tablet (20 mg) by mouth daily for cholesterol 8/22/2024    buPROPion (WELLBUTRIN SR) 100 MG 12 hr tablet Take 1 tablet (100 mg) by mouth daily for mood 8/22/2024    calcium carbonate (TUMS) 500 MG chewable tablet Take 1 chew tab by mouth 2 times daily as needed for heartburn     Cholecalciferol (VITAMIN D) 125 MCG (5000 UT) capsule Take 1 tablet by mouth every evening 8/22/2024    cyclobenzaprine (FLEXERIL) 5 MG tablet Take 1 tablet (5 mg) by mouth 3 times daily as needed for muscle spasms 8/23/2024 at noon    DULoxetine (CYMBALTA) 60 MG capsule Take 1 capsule (60 mg) by mouth daily 8/22/2024    fexofenadine (ALLEGRA) 60 MG tablet Take 60 mg by mouth daily 8/22/2024    fluticasone-salmeterol (WIXELA INHUB) 100-50 MCG/ACT inhaler USE 1 INHALATION BY MOUTH EVERY  12 HOURS (Patient taking differently: Inhale 1 puff into the lungs daily. USE 1 INHALATION BY MOUTH EVERY  12 HOURS) 8/22/2024    Menthol, Topical Analgesic, (ICY  HOT MEDICATED SPRAY EX) Externally apply topically daily as needed (pain, in neck, back, hand)     metoprolol tartrate (LOPRESSOR) 25 MG tablet Take 3 tablets (75 mg) by mouth 2 times daily 8/22/2024    miconazole (MICATIN) 2 % external cream Apply topically 2 times daily as needed for other (rash/irritation)     nitroGLYcerin (NITROSTAT) 0.4 MG sublingual tablet FOR CHEST PAIN PLACE 1 TABLET UNDER THE TONGUE EVERY 5 MINUTES FOR 3 DOSES. IF SYMPTOMS PERSIST 5 MINUTES AFTER 1ST DOSE CALL 911     oxyCODONE (ROXICODONE) 5 MG tablet Take 0.5 tablets (2.5 mg) by mouth every 8 hours as needed for pain     pantoprazole (PROTONIX) 20 MG EC tablet Take 1 tablet (20 mg) by mouth daily 8/22/2024    torsemide (DEMADEX) 20 MG tablet Take 1 tablet (20 mg) by mouth 2 times daily 8/22/2024    triamcinolone (KENALOG) 0.1 % external cream Apply topically 2 times daily as needed for irritation

## 2024-08-23 NOTE — TELEPHONE ENCOUNTER
MTM referral from: Transitions of Care (recent hospital discharge or ED visit)    MTM referral outreach attempt #1 on August 23, 2024 at 9:55 AM      Outcome: Spoke with patient she had a fall this AM and is going to go back to the ER.     Use [facility] med adv for the carrier/Plan on the flowsheet      Jen Beltran CMA  MTM

## 2024-08-23 NOTE — TELEPHONE ENCOUNTER
When calling to schedule pt she reports she had another fall this morning and is hurting all over. She states she will have Daughter in law take her to the ER. She stated she is interested in moving to Assisted Living. Maybe care coordination could help with this?  Please advise.  Jen Beltran, [facility]

## 2024-08-23 NOTE — ED TRIAGE NOTES
Pt presents to ed to be evaluated for frequent falls and pain.  Pt was hospitalized here on 8/3 for a UTI, and was sent to [place] for rehab afterwards. Pt has had this pain for the past 3 weeks, and reports the pain is in her knees and her hips. Pt has had two falls in there past 3 days, and has a contusion on her forehead. Pt is on eliquis, but denies LOC from any of the falls.        Triage Assessment (Adult)       Row Name 08/23/24 1348          Triage Assessment    Airway WDL WDL        Respiratory WDL    Respiratory WDL WDL        Peripheral/Neurovascular WDL    Peripheral Neurovascular WDL WDL

## 2024-08-23 NOTE — TELEPHONE ENCOUNTER
Patient Contact    Attempt # 1    Was call answered? No.    Left message for patient to call triage back.    Jossy Sheets RN

## 2024-08-24 ENCOUNTER — APPOINTMENT (OUTPATIENT)
Dept: PHYSICAL THERAPY | Facility: CLINIC | Age: 89
End: 2024-08-24
Attending: INTERNAL MEDICINE
Payer: COMMERCIAL

## 2024-08-24 LAB
ANION GAP SERPL CALCULATED.3IONS-SCNC: 12 MMOL/L (ref 7–15)
BUN SERPL-MCNC: 47.4 MG/DL (ref 8–23)
CALCIUM SERPL-MCNC: 9.7 MG/DL (ref 8.8–10.4)
CHLORIDE SERPL-SCNC: 104 MMOL/L (ref 98–107)
CK SERPL-CCNC: 132 U/L (ref 26–192)
CREAT SERPL-MCNC: 1.92 MG/DL (ref 0.51–0.95)
EGFRCR SERPLBLD CKD-EPI 2021: 24 ML/MIN/1.73M2
ERYTHROCYTE [DISTWIDTH] IN BLOOD BY AUTOMATED COUNT: 16.3 % (ref 10–15)
GLUCOSE BLDC GLUCOMTR-MCNC: 113 MG/DL (ref 70–99)
GLUCOSE BLDC GLUCOMTR-MCNC: 132 MG/DL (ref 70–99)
GLUCOSE BLDC GLUCOMTR-MCNC: 139 MG/DL (ref 70–99)
GLUCOSE BLDC GLUCOMTR-MCNC: 141 MG/DL (ref 70–99)
GLUCOSE BLDC GLUCOMTR-MCNC: 154 MG/DL (ref 70–99)
GLUCOSE SERPL-MCNC: 141 MG/DL (ref 70–99)
HCO3 SERPL-SCNC: 24 MMOL/L (ref 22–29)
HCT VFR BLD AUTO: 39.8 % (ref 35–47)
HGB BLD-MCNC: 11.9 G/DL (ref 11.7–15.7)
MCH RBC QN AUTO: 25.8 PG (ref 26.5–33)
MCHC RBC AUTO-ENTMCNC: 29.9 G/DL (ref 31.5–36.5)
MCV RBC AUTO: 86 FL (ref 78–100)
PLATELET # BLD AUTO: 272 10E3/UL (ref 150–450)
POTASSIUM SERPL-SCNC: 4.3 MMOL/L (ref 3.4–5.3)
PROCALCITONIN SERPL IA-MCNC: 0.11 NG/ML
RBC # BLD AUTO: 4.61 10E6/UL (ref 3.8–5.2)
SODIUM SERPL-SCNC: 140 MMOL/L (ref 135–145)
WBC # BLD AUTO: 8.3 10E3/UL (ref 4–11)

## 2024-08-24 PROCEDURE — 250N000011 HC RX IP 250 OP 636: Performed by: INTERNAL MEDICINE

## 2024-08-24 PROCEDURE — 250N000013 HC RX MED GY IP 250 OP 250 PS 637: Performed by: INTERNAL MEDICINE

## 2024-08-24 PROCEDURE — 96366 THER/PROPH/DIAG IV INF ADDON: CPT

## 2024-08-24 PROCEDURE — 97162 PT EVAL MOD COMPLEX 30 MIN: CPT | Mod: GP

## 2024-08-24 PROCEDURE — G0378 HOSPITAL OBSERVATION PER HR: HCPCS

## 2024-08-24 PROCEDURE — 99233 SBSQ HOSP IP/OBS HIGH 50: CPT | Performed by: INTERNAL MEDICINE

## 2024-08-24 PROCEDURE — 36415 COLL VENOUS BLD VENIPUNCTURE: CPT | Performed by: INTERNAL MEDICINE

## 2024-08-24 PROCEDURE — 96361 HYDRATE IV INFUSION ADD-ON: CPT

## 2024-08-24 PROCEDURE — 82962 GLUCOSE BLOOD TEST: CPT

## 2024-08-24 PROCEDURE — 84145 PROCALCITONIN (PCT): CPT | Performed by: INTERNAL MEDICINE

## 2024-08-24 PROCEDURE — 80048 BASIC METABOLIC PNL TOTAL CA: CPT | Performed by: INTERNAL MEDICINE

## 2024-08-24 PROCEDURE — 258N000003 HC RX IP 258 OP 636: Performed by: INTERNAL MEDICINE

## 2024-08-24 PROCEDURE — 96365 THER/PROPH/DIAG IV INF INIT: CPT

## 2024-08-24 PROCEDURE — 85048 AUTOMATED LEUKOCYTE COUNT: CPT | Performed by: INTERNAL MEDICINE

## 2024-08-24 PROCEDURE — 82550 ASSAY OF CK (CPK): CPT | Performed by: INTERNAL MEDICINE

## 2024-08-24 PROCEDURE — 97530 THERAPEUTIC ACTIVITIES: CPT | Mod: GP

## 2024-08-24 RX ORDER — SODIUM CHLORIDE, SODIUM LACTATE, POTASSIUM CHLORIDE, CALCIUM CHLORIDE 600; 310; 30; 20 MG/100ML; MG/100ML; MG/100ML; MG/100ML
INJECTION, SOLUTION INTRAVENOUS CONTINUOUS
Status: ACTIVE | OUTPATIENT
Start: 2024-08-24 | End: 2024-08-24

## 2024-08-24 RX ORDER — CEFTRIAXONE 1 G/1
1 INJECTION, POWDER, FOR SOLUTION INTRAMUSCULAR; INTRAVENOUS EVERY 24 HOURS
Status: DISCONTINUED | OUTPATIENT
Start: 2024-08-24 | End: 2024-08-24

## 2024-08-24 RX ORDER — CEFTRIAXONE 2 G/1
2 INJECTION, POWDER, FOR SOLUTION INTRAMUSCULAR; INTRAVENOUS EVERY 24 HOURS
Status: DISCONTINUED | OUTPATIENT
Start: 2024-08-24 | End: 2024-08-25

## 2024-08-24 RX ADMIN — APIXABAN 2.5 MG: 2.5 TABLET, FILM COATED ORAL at 09:32

## 2024-08-24 RX ADMIN — APIXABAN 2.5 MG: 2.5 TABLET, FILM COATED ORAL at 19:29

## 2024-08-24 RX ADMIN — PANTOPRAZOLE SODIUM 20 MG: 20 TABLET, DELAYED RELEASE ORAL at 09:32

## 2024-08-24 RX ADMIN — METOPROLOL TARTRATE 75 MG: 50 TABLET, FILM COATED ORAL at 09:32

## 2024-08-24 RX ADMIN — OXYCODONE HYDROCHLORIDE 2.5 MG: 5 TABLET ORAL at 11:35

## 2024-08-24 RX ADMIN — METOPROLOL TARTRATE 75 MG: 50 TABLET, FILM COATED ORAL at 19:29

## 2024-08-24 RX ADMIN — ACETAMINOPHEN 1000 MG: 500 TABLET, FILM COATED ORAL at 19:29

## 2024-08-24 RX ADMIN — CEFTRIAXONE SODIUM 2 G: 2 INJECTION, POWDER, FOR SOLUTION INTRAMUSCULAR; INTRAVENOUS at 19:35

## 2024-08-24 RX ADMIN — DULOXETINE HYDROCHLORIDE 60 MG: 60 CAPSULE, DELAYED RELEASE ORAL at 09:32

## 2024-08-24 RX ADMIN — MICONAZOLE NITRATE: 2 POWDER TOPICAL at 19:34

## 2024-08-24 RX ADMIN — SODIUM CHLORIDE, POTASSIUM CHLORIDE, SODIUM LACTATE AND CALCIUM CHLORIDE: 600; 310; 30; 20 INJECTION, SOLUTION INTRAVENOUS at 10:49

## 2024-08-24 RX ADMIN — ACETAMINOPHEN 1000 MG: 500 TABLET, FILM COATED ORAL at 14:11

## 2024-08-24 RX ADMIN — FLUTICASONE FUROATE AND VILANTEROL TRIFENATATE 1 PUFF: 100; 25 POWDER RESPIRATORY (INHALATION) at 09:34

## 2024-08-24 RX ADMIN — ACETAMINOPHEN 1000 MG: 500 TABLET, FILM COATED ORAL at 09:33

## 2024-08-24 RX ADMIN — BUPROPION HYDROCHLORIDE 100 MG: 100 TABLET, FILM COATED, EXTENDED RELEASE ORAL at 09:32

## 2024-08-24 RX ADMIN — CYCLOBENZAPRINE 5 MG: 5 TABLET, FILM COATED ORAL at 16:48

## 2024-08-24 RX ADMIN — MICONAZOLE NITRATE: 2 POWDER TOPICAL at 10:49

## 2024-08-24 ASSESSMENT — ACTIVITIES OF DAILY LIVING (ADL)
ADLS_ACUITY_SCORE: 49
ADLS_ACUITY_SCORE: 53
ADLS_ACUITY_SCORE: 49
DEPENDENT_IADLS:: CLEANING;SHOPPING
ADLS_ACUITY_SCORE: 49
ADLS_ACUITY_SCORE: 49
ADLS_ACUITY_SCORE: 53
ADLS_ACUITY_SCORE: 49
ADLS_ACUITY_SCORE: 53
ADLS_ACUITY_SCORE: 49

## 2024-08-24 NOTE — PROGRESS NOTES
Admission/Transfer from: Admission   2 RN skin assessment completed. Yes  Name of 2nd RN: Nikki mccormack RN   Significant findings include: Redness to Right abdominal and breast fold. bruising to shin, back and face   WO Nurse Consult Ordered? No   negative normal/soft/nontender/nondistended/normal active bowel sounds

## 2024-08-24 NOTE — PLAN OF CARE
08/24/24 1144   Appointment Info   Signing Clinician's Name / Credentials (PT) Ebony Roger DPT   Quick Adds   Quick Adds Certification   Living Environment   People in Home alone   Current Living Arrangements apartment   Home Accessibility stairs to enter home   Number of Stairs, Main Entrance 1   Stair Railings, Main Entrance none   Transportation Anticipated family or friend will provide   Living Environment Comments Pt lives alone in an apartment with 1 curb to enter from the parking lot   Self-Care   Usual Activity Tolerance moderate   Current Activity Tolerance poor   Regular Exercise No   Equipment Currently Used at Home walker, rolling   Fall history within last six months yes   Activity/Exercise/Self-Care Comment pt mod I w/ 4ww (has in rm), has cleaning assist. Assist w/ showering. Reports her apartment is IND living and not attached to [facility] however pt questionable hisotrian   General Information   Onset of Illness/Injury or Date of Surgery 08/23/24   Referring Physician Tegan Nguyen MD   Pertinent History of Current Problem (include personal factors and/or comorbidities that impact the POC) Moira Andre is a 89 year old female with PMH of diastolic CHF, CKD4, HTN, recurrent falls, atrial fibrillation s/p ablation and pacemaker, ASCVD, DM2, depression, GERD, asthma, who presents with a fall.   Existing Precautions/Restrictions fall   Weight-Bearing Status - LLE weight-bearing as tolerated   Weight-Bearing Status - RLE weight-bearing as tolerated   Cognition   Affect/Mental Status (Cognition) confused   Cognitive Status Comments tearful throughout session   Pain Assessment   Patient Currently in Pain Yes, see Vital Sign flowsheet  (diffuse pain, stating it moves around. Mostly R hip during eval)   Integumentary/Edema   Integumentary/Edema Comments brusing to forehead, see nursing notes for details   Posture    Posture Forward head position   Range of Motion (ROM)   ROM Comment BLE WFL   Strength  (Manual Muscle Testing)   Strength Comments Pt demonstrate gross functional weakness w/ OOB mobility   Bed Mobility   Comment, (Bed Mobility) mod A x 2   Transfers   Comment, (Transfers) FWW mod A x 1   Gait/Stairs (Locomotion)   Comment, (Gait/Stairs) Unable to tolerate ambulation   Balance   Balance Comments Impaired dynamic balance from baseline   Clinical Impression   Criteria for Skilled Therapeutic Intervention Yes, treatment indicated   PT Diagnosis (PT) impaired IND with mobility from baseline   Influenced by the following impairments functional weakness, age, imipaired balance, pain, impaired activity tolerance   Functional limitations due to impairments see above   Clinical Presentation (PT Evaluation Complexity) evolving   Clinical Presentation Rationale lives alone, clinical judgement   Clinical Decision Making (Complexity) moderate complexity   Planned Therapy Interventions (PT) balance training;bed mobility training;gait training;home exercise program;neuromuscular re-education;patient/family education;stair training;strengthening;ROM (range of motion);transfer training;progressive activity/exercise;home program guidelines   Risk & Benefits of therapy have been explained evaluation/treatment results reviewed;care plan/treatment goals reviewed;risks/benefits reviewed;current/potential barriers reviewed;participants voiced agreement with care plan;participants included;patient   PT Total Evaluation Time   PT Eval, Moderate Complexity Minutes (10211) 10   Therapy Certification   Start of care date 08/23/24   Certification date from 08/24/24   Certification date to 08/31/24   Medical Diagnosis Weakness, fall   Physical Therapy Goals   PT Frequency 5x/week   PT Predicted Duration/Target Date for Goal Attainment 08/31/24   PT: Bed Mobility Independent;Supine to/from sit   PT: Transfers Modified independent;Sit to/from stand;Bed to/from chair;Assistive device   PT: Gait Modified independent;Assistive  device;150 feet   PT: Stairs Supervision/stand-by assist;1 stair;Assistive device   PT Discharge Planning   PT Plan Initiate gait w/ WC fofllow   PT Discharge Recommendation (DC Rec) Transitional Care Facility   PT Rationale for DC Rec Pt far below baseline at this time, emotionally labile throughout session. Pt currently limited by pain, weakness, impaired activity tolerance. Currently pt mod A x 1 w/ FWW, unable to ambulate this AM, recommend DC to TCU to improve IND w/ mobility. Pt will likely need higher level of care in near future   PT Brief overview of current status Goals of therapy will be to address safe mobility and make recommendations for discharge to next level of care. Patient and RN will continue to follow all falls risk precautions as documented by RN staff while hospitalized.  Mod A x 1 FWW   Total Session Time   Total Session Time (sum of timed and untimed services) [address] [place] [address]  OUTPATIENT PHYSICAL THERAPY EVALUATION  PLAN OF TREATMENT FOR OUTPATIENT REHABILITATION  (COMPLETE FOR INITIAL CLAIMS ONLY)  Patient's Last Name, First Name, M.I.  YOB: 1933  Moira Andre                        Provider's Name  [facility] Medical Record No.  [id number]                             Onset Date:  08/23/24   Start of Care Date:  08/23/24   Type:     _X_PT   ___OT   ___SLP Medical Diagnosis:  Weakness, fall              PT Diagnosis:  impaired IND with mobility from baseline Visits from SOC:  1     See note for plan of treatment, functional goals and certification details    I CERTIFY THE NEED FOR THESE SERVICES FURNISHED UNDER        THIS PLAN OF TREATMENT AND WHILE UNDER MY CARE     (Physician co-signature of this document indicates review and certification of the therapy plan).

## 2024-08-24 NOTE — PROGRESS NOTES
Observation goals  PRIOR TO DISCHARGE       Comments:   -diagnostic tests and consults completed and resulted- Not met   -vital signs normal or at patient baseline- Met   -safe disposition plan has been identified- Not met

## 2024-08-24 NOTE — ED NOTES
[facility]  ED Nurse Handoff Report    ED Chief complaint: Fall and Leg Pain      ED Diagnosis:   Final diagnoses:   None       Code Status: Full Code    Allergies:   Allergies   Allergen Reactions    Aspirin Hives     Reaction occurred during childhood.     Lidocaine Itching    Lisinopril Cough    Losartan      Hyperkalemia      Metformin      Elevated lactic acid    Minocycline      Yellow Dye Allergy. Minocycline has Yellow Dye #10.    Mounjaro [Tirzepatide] Diarrhea and GI Disturbance    Salicylates Hives    Yellow Dye Hives     Rxn to yellow tablet. Eyes swelled shut.     Yellow Dyes (Non-Tartrazine) Hives       Patient Story:  Moira Andre is a 89 year old female on Eliquis with a history of CKD, CHF, diabetes mellitus, MI, and TIA, who presents to the ED for evaluation after a fall, head injury, back pain, leg pain. The patient was hospitalized here 8/3/24-8/7/24 for a UTI, and went to [place] for rehab afterwards. She left [place] on Monday (8/19/24) and is now living independently. She is supposed to get in-home care, but is currently between services, so she hasn't received any help since getting home from [place]. Since getting home she has had 2 falls. This morning when she fell, she states she got up, took a step, and her legs gave out. She fell and hit her head, but denies any loss of consciousness. Family notes that her ability to ambulate has changed recently, even with the aid of a walker. She complains of pain to her lower back, knees, ankles, shoulders. Cyclobenzaprine provides little relief. The patient also complains of rash w/itchiness to her groin, which she has powder to put on. She denies pain in her abdomen or near her hernia. No chest pain, palpitations, dizziness, sob, urinary symptoms, vomiting or diarrhea.  Denies numbness in torso, face, or extremities.  She denies any fever or cough. Of note, the patient recently ran out of her oxycodone, which she had prescribed to be  used as needed.     Focused Assessment:    Patient is A&Ox4. No cough or SOB. Breathing rate, rhythm and SPO2 WDL. Denies chest pain. HR WDL. Slight hypertension. States bilateral leg pain and cramping.     Labs Ordered and Resulted from Time of ED Arrival to Time of ED Departure   COMPREHENSIVE METABOLIC PANEL - Abnormal       Result Value    Sodium 140      Potassium 4.3      Carbon Dioxide (CO2) 24      Anion Gap 17 (*)     Urea Nitrogen 51.3 (*)     Creatinine 2.08 (*)     GFR Estimate 22 (*)     Calcium 10.4      Chloride 99      Glucose 191 (*)     Alkaline Phosphatase 80      AST 14      ALT 8      Protein Total 8.1      Albumin 4.1      Bilirubin Total 0.6     CK TOTAL - Abnormal     (*)    CBC WITH PLATELETS AND DIFFERENTIAL - Abnormal    WBC Count 11.0      RBC Count 4.91      Hemoglobin 12.9      Hematocrit 41.3      MCV 84      MCH 26.3 (*)     MCHC 31.2 (*)     RDW 16.1 (*)     Platelet Count 369      % Neutrophils 79      % Lymphocytes 11      % Monocytes 9      % Eosinophils 1      % Basophils 0      % Immature Granulocytes 1      NRBCs per 100 WBC 0      Absolute Neutrophils 8.7 (*)     Absolute Lymphocytes 1.2      Absolute Monocytes 1.0      Absolute Eosinophils 0.1      Absolute Basophils 0.0      Absolute Immature Granulocytes 0.1      Absolute NRBCs 0.0     MAGNESIUM - Normal    Magnesium 2.2     TSH WITH FREE T4 REFLEX - Normal    TSH 2.01     COVID-19 VIRUS (CORONAVIRUS) BY PCR - Normal    SARS CoV2 PCR Negative     ROUTINE UA WITH MICROSCOPIC REFLEX TO CULTURE       CT Pelvis Bone wo Contrast   Final Result   IMPRESSION:   1.  No acute fracture is identified. Given the degree of osseous demineralization, MRI would be more sensitive for nondisplaced fracture and should be considered if there is persistent clinical concern.   2.  Moderate to advanced right hip degenerative arthrosis. There is right hip joint effusion.   3.  Additional chronic-appearing findings, as described.      CT  Lumbar Spine w/o Contrast   Final Result   IMPRESSION:   1.  No fracture or posttraumatic subluxation.   2.  Multilevel high-grade spinal canal and foraminal stenosis.   3.  Right kidney stone, measures 9 mm. No hydronephrosis. Moderate surrounding chronic fat stranding unchanged from prior CT of 11/26/2023, may be secondary to chronic urinary infection.      CT Cervical Spine w/o Contrast   Final Result   IMPRESSION:   1.  No fracture or posttraumatic subluxation.         Head CT w/o contrast   Final Result   IMPRESSION:   1.  No CT evidence for acute intracranial process.   2.  Brain atrophy and presumed chronic microvascular ischemic changes as above.            Treatments and/or interventions provided:  Medications   sodium chloride 0.9% BOLUS 1,000 mL (1,000 mLs Intravenous $New Bag 8/23/24 1632)   acetaminophen (TYLENOL) tablet 650 mg (650 mg Oral $Given 8/23/24 1618)   oxyCODONE IR (ROXICODONE) half-tab 2.5 mg (2.5 mg Oral $Given 8/23/24 1618)       Patient's response to treatments and/or interventions:  Patient experienced pain control from oxycodone and tylenol.     To be done/followed up on inpatient unit:   See any in-patient orders. PT.     Does this patient have any cognitive concerns?:  N/A    Activity level - Baseline/Home:    Walker    Activity Level - Current:    Stand with Assist    Patient's Preferred language: English     Needed?: No    Isolation: None  Infection: Not Applicable  Patient tested for COVID 19 prior to admission: YES    Bariatric?: No    Vital Signs:   Vitals:    08/23/24 1852 08/23/24 1853 08/23/24 1855 08/23/24 1856   BP:       Pulse:       Resp:       Temp:       TempSrc:       SpO2: (!) 71% (!) 89% (!) 82% 91%   Weight:       Height:           Cardiac Rhythm:     Was the PSS-3 completed:   Yes  What interventions are required if any?                 Family Comments: DIL at bedside.    OBS brochure/video discussed/provided to patient/family: No              Name of  person given brochure if not patient: N/A              Relationship to patient: N/A    For the majority of the shift this patient's behavior was Green.  Behavioral interventions performed were N/A.    ED NURSE PHONE NUMBER: *[phone]

## 2024-08-24 NOTE — H&P
[facility]    History and Physical  Hospitalist       Date of Admission:  8/23/2024    Assessment & Plan   Moira Andre is a 89 year old female with PMH of diastolic CHF, CKD4, HTN, recurrent falls, atrial fibrillation s/p ablation and pacemaker, ASCVD, DM2, depression, GERD, asthma, who presents with a fall.    Mechanical fall  Mild rhabdomyolysis  Recurrent falls  Patient has recent frequent falls--recently hospitalized 8/3 to 8/7 for a fall, UTI, then discharged to TCU, and then discharged home independently on 8/19. She then fell on 8/21 and now again 8/23, this time hitting her head and unable to get up. CT head no acute pathology, CT cerbvical lumbar spine no fracture, CT pelvis no clear fracture. Patient reports ongoing left leg spasms and has been taking muscle relaxants and oxycodone at home which increases her risk for a fall. Her falls are likely multifactorial. She may need a higher level of care like [facility] which patient states she is looking for currently.  - Trend CK  - PT/OT  - Pain control with scheduled tylenol, PTA oxycodone PRN  - Continue flexeril PRN  - Consider reducing meds at discharge to reduce risks of falls  - If patient has persistent pelvic or left leg pain, consider MRI left hip to rule out smaller or occult fracture  - Social work consult    Possible UTI  Borderline leukocytosis  Right 9mm stone  Patient was recently hospitalized for E coli UTI. She does not have urinary symptoms now, however CT lumbar spine does show a 9mm Right kidney stone, no associated hydronephrosis, with some fat stranding concerning for a chronic urinary infection. Note borderline leukocytosis 11.0k (11.1k is considered elevated).  - Given Ceftriaxone in ED, hold further antibiotics pending culture  - Outpatient urology follow up    Atrial fibrillation s/p ablation and pacemaker  Diastolic CHF  HTN  ASCVD  Does not appear volume oveloaded here.  - PTA apixaban, metoprolol  - Hold PTA  "statin  - PTA torsemide    CKD4: Cr near baseline 2.08    DM2: Not on any PTA antidiabetics. Last A1c 7.2%.  here  - MDSSI    Depression: PTA bupropion  GERD: PTA PPI  Asthma: PTA inhaler    Clinically Significant Risk Factors Present on Admission               # Drug Induced Coagulation Defect: home medication list includes an anticoagulant medication    # Hypertension: Noted on problem list  # Chronic heart failure with preserved ejection fraction: heart failure noted on problem list and last echo with EF >50%       # DMII: A1C = 7.2 % (Ref range: 0.0 - 5.6 %) within past 6 months    # Obesity: Estimated body mass index is 37.9 kg/m  as calculated from the following:    Height as of this encounter: 1.702 m (5' 7\").    Weight as of this encounter: 109.8 kg (242 lb).       # Financial/Environmental Concerns:    # Asthma: noted on problem list  # Pacemaker present              PT/OT: ordered  Diet: Diabetic  DVT Prophylaxis: Eliquis  Charles Catheter: Not present  Lines: None     Cardiac Monitoring: None  Code Status: Full code, DIL bedside  Disposition: Anticipated discharge tomorrow to home    Jozef Oquendo MD  Hospitalist Service  [employer]  Securely message with [employer] (more info)  Text page via [facility] Paging/Directory     Primary Care Physician   Maryan Churchill    Chief Complaint   Fall    History is obtained from the patient  Case discussed with ED provider    History of Present Illness   Moira Andre is a 89 year old female who presents with fall. She was recently discharged from TCU four days ago. She fell two days ago, reports mechanical. Then this morning after standing up from her commode she took two steps and then fell. Denies premonitory symptoms. She landed on her right side and hit her head, denies LOC. Denies fevers, chills. Has poor appetite and has not been eating much. She is having left leg spasms and has been taking muscle relaxers and oxycodone. Denies " urinary frequency or dysuria.    Past Medical History    I have reviewed this patient's medical history and updated it with pertinent information if needed.   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  Past Surgical History:   Procedure Laterality Date    APPENDECTOMY      BIOPSY BREAST      x2 -needle & lumpectomy-benign    CHOLECYSTECTOMY      CORONARY ANGIOGRAPHY ADULT ORDER  9/28/2007    Bare metal stent to OM1, Diagonal patent     CORONARY ANGIOGRAPHY ADULT ORDER  9/25/2007    Sheridan stent to Diagonal    EP ABLATION AV NODE N/A 7/12/2024    Procedure: Ablation Atrioventricular Node;  Surgeon: Davion Baron MD;  Location: [facility] CARDIAC C[facility] LAB    EP PACEMAKER DEVICE & LEAD IMPLANT- RIGHT VENTRICULAR N/A 7/12/2024    Procedure: Pacemaker Device & Lead Implant- Right ventricular;  Surgeon: Davion Baron MD;  Location:  HEART CARDIAC CATH LAB    HC LEFT HEART  CATHETERIZATION  8/2013    Moderate CAD    HYSTERECTOMY TOTAL ABDOMINAL      ORTHOPEDIC SURGERY      knee replacement on right side (2006), Left side (2016)    RELEASE CARPAL TUNNEL      right and left    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 Taking?   Cholecalciferol (VITAMIN D) 125 MCG (5000 UT) capsule 8/22/2024 Self Yes Yes   Sig: Take 1 tablet by mouth every evening   DULoxetine (CYMBALTA) 60 MG capsule 8/22/2024 Self No Yes   Sig: Take 1 capsule (60 mg) by mouth daily   Menthol, Topical Analgesic, (ICY HOT MEDICATED SPRAY EX)  Self Yes Yes   Sig: Externally apply topically daily as needed (pain, in neck, back, hand)   acetaminophen (TYLENOL) 500 MG tablet 8/22/2024  Yes Yes   Sig: Take 500-1,000 mg by mouth 3 times daily.   albuterol (PROAIR HFA/PROVENTIL HFA/VENTOLIN HFA) 108 (90 Base) MCG/ACT inhaler  Self Yes Yes   Sig: Inhale 2 puffs into the lungs every 6 hours as needed for shortness of breath, wheezing or cough For shortness of breath   apixaban ANTICOAGULANT (ELIQUIS) 2.5 MG tablet 8/22/2024 Self No Yes   Sig: Take 1 tablet (2.5 mg) by mouth 2 times daily   atorvastatin (LIPITOR) 20 MG tablet 8/22/2024 Self No Yes   Sig: Take 1 tablet (20 mg) by mouth daily for cholesterol   blood glucose (ACCU-CHEK GUIDE) test strip   No No   Sig: Use to test blood sugar once daily or as directed.   blood glucose (ACCU-CHEK SOFTCLIX) lancing device   No No   Sig: Lancing device to be used with lancets.   blood glucose monitoring (SOFTCLIX) lancets   No No   Sig: Use to test blood sugar once daily.   buPROPion (WELLBUTRIN SR) 100 MG 12 hr tablet 8/22/2024 Self No Yes   Sig: Take 1 tablet (100 mg) by mouth daily for mood   calcium carbonate (TUMS) 500 MG chewable tablet  Self Yes Yes   Sig: Take 1 chew tab by mouth 2 times daily as needed for heartburn   cyclobenzaprine (FLEXERIL) 5  MG tablet 8/23/2024 at noon  No Yes   Sig: Take 1 tablet (5 mg) by mouth 3 times daily as needed for muscle spasms   fexofenadine (ALLEGRA) 60 MG tablet 8/22/2024 Self Yes Yes   Sig: Take 60 mg by mouth daily   fluticasone-salmeterol (WIXELA INHUB) 100-50 MCG/ACT inhaler 8/22/2024 Self No Yes   Sig: USE 1 INHALATION BY MOUTH EVERY  12 HOURS   Patient taking differently: Inhale 1 puff into the lungs daily. USE 1 INHALATION BY MOUTH EVERY  12 HOURS   metoprolol tartrate (LOPRESSOR) 25 MG tablet 8/22/2024 Self No Yes   Sig: Take 3 tablets (75 mg) by mouth 2 times daily   miconazole (MICATIN) 2 % external cream   Yes Yes   Sig: Apply topically 2 times daily as needed for other (rash/irritation)   nitroGLYcerin (NITROSTAT) 0.4 MG sublingual tablet   No Yes   Sig: FOR CHEST PAIN PLACE 1 TABLET UNDER THE TONGUE EVERY 5 MINUTES FOR 3 DOSES. IF SYMPTOMS PERSIST 5 MINUTES AFTER 1ST DOSE CALL 911   oxyCODONE (ROXICODONE) 5 MG tablet   No Yes   Sig: Take 0.5 tablets (2.5 mg) by mouth every 8 hours as needed for pain   pantoprazole (PROTONIX) 20 MG EC tablet 8/22/2024 Self No Yes   Sig: Take 1 tablet (20 mg) by mouth daily   torsemide (DEMADEX) 20 MG tablet 8/22/2024  No Yes   Sig: Take 1 tablet (20 mg) by mouth 2 times daily   triamcinolone (KENALOG) 0.1 % external cream   Yes Yes   Sig: Apply topically 2 times daily as needed for irritation      Facility-Administered Medications: None     Allergies   Allergies   Allergen Reactions    Aspirin Hives     Reaction occurred during childhood.     Lidocaine Itching    Lisinopril Cough    Losartan      Hyperkalemia      Metformin      Elevated lactic acid    Minocycline      Yellow Dye Allergy. Minocycline has Yellow Dye #10.    Mounjaro [Tirzepatide] Diarrhea and GI Disturbance    Salicylates Hives    Yellow Dye Hives     Rxn to yellow tablet. Eyes swelled shut.     Yellow Dyes (Non-Tartrazine) Hives       Social History   I have reviewed this patient's social history and updated it  with pertinent information if needed. Moira Andre  reports that she quit smoking about 51 years ago. Her smoking use included cigarettes. She started smoking about 52 years ago. She has a 0.3 pack-year smoking history. She has never used smokeless tobacco. She reports current alcohol use. She reports that she does not use drugs.    Family History   I have reviewed this patient's family history and updated it with pertinent information if needed.   Family History   Problem Relation Age of Onset    Neurologic Disorder Mother         MS - at 60's    C.A.D. Father          at 8o's, ? prostate ca    Breast Cancer No family hx of     Cancer - colorectal No family hx of        Review of Systems   The 10 point Review of Systems is negative other than noted in the HPI or here.    Physical Exam   Temp: 98.4  F (36.9  C) Temp src: Temporal BP: 135/64 Pulse: 70   Resp: 20 SpO2: 93 %      Vital Signs with Ranges  Temp:  [98.4  F (36.9  C)] 98.4  F (36.9  C)  Pulse:  [67-78] 70  Resp:  [20] 20  BP: (135-163)/(56-83) 135/64  SpO2:  [71 %-93 %] 93 %  242 lbs 0 oz    Constitutional: Obese female appears deconditioned  Eyes: PERRL, nonicteric  HEENT: Normocephalic, atraumatic, oral mucosa moist  Respiratory: CTAB, no wheezing or crackles  Cardiovascular: RRR, normal S1/2, no m/r/g  GI: Nontender, nondistended  Vascular: Trace lower extremity pitting edema  Skin: Skin tear left elbow, bruising over back  Musculoskeletal: Moves all extremities  Neurologic: A&Ox3  Psychiatric: Appropriate affect and mood    Data   Data reviewed today:  I personally reviewed labwork, XR  Recent Labs   Lab 24  1629   WBC 11.0   HGB 12.9   MCV 84         POTASSIUM 4.3   CHLORIDE 99   CO2 24   BUN 51.3*   CR 2.08*   ANIONGAP 17*   TELLY 10.4   *   ALBUMIN 4.1   PROTTOTAL 8.1   BILITOTAL 0.6   ALKPHOS 80   ALT 8   AST 14       Imaging:  Recent Results (from the past 24 hour(s))   Head CT w/o contrast    Narrative    EXAM:  CT HEAD W/O CONTRAST  LOCATION: [facility]  DATE: 8/23/2024    INDICATION: Frequent falls with forehead contusion.  COMPARISON: CT 8/6/2024.  TECHNIQUE: Routine CT Head without IV contrast. Multiplanar reformats. Dose reduction techniques were used.    FINDINGS:  INTRACRANIAL CONTENTS: No intracranial hemorrhage, extraaxial collection, or mass effect.  No CT evidence of acute infarct. Small chronic lacunar infarct left basal ganglia. Mild to moderate presumed chronic small vessel ischemic changes. Mild to   moderate generalized volume loss. No hydrocephalus.     VISUALIZED ORBITS/SINUSES/MASTOIDS: No intraorbital abnormality. No paranasal sinus mucosal disease. No middle ear or mastoid effusion.    BONES/SOFT TISSUES: No acute abnormality.      Impression    IMPRESSION:  1.  No CT evidence for acute intracranial process.  2.  Brain atrophy and presumed chronic microvascular ischemic changes as above.   CT Cervical Spine w/o Contrast    Narrative    EXAM: CT CERVICAL SPINE W/O CONTRAST  LOCATION: [facility]  DATE: 8/23/2024    INDICATION: Frequent fall, head injury.  COMPARISON: None.  TECHNIQUE: Routine CT Cervical Spine without IV contrast. Multiplanar reformats. Dose reduction techniques were used.    FINDINGS:  VERTEBRA: Normal vertebral body heights. Anterolisthesis C4-C5, reversal of lordosis, apex at C5-C6.. No fracture or posttraumatic subluxation.     CANAL/FORAMINA: At C3-4, severe left foraminal stenosis. At C4-5, moderate left foraminal stenosis. At C5-6, moderate right foraminal stenosis.     PARASPINAL: No extraspinal abnormality.      Impression    IMPRESSION:  1.  No fracture or posttraumatic subluxation.     CT Lumbar Spine w/o Contrast    Narrative    EXAM: CT LUMBAR SPINE W/O CONTRAST  LOCATION: [facility]  DATE: 8/23/2024    INDICATION: Fall, back pain.  COMPARISON: CT 11/26/2024.  TECHNIQUE: Routine CT Lumbar Spine  without IV contrast. Multiplanar reformats. Dose reduction techniques were used.     FINDINGS:  VERTEBRA: Grade 1 anterolisthesis L3-L4 and L5-S1. Normal vertebral body heights. No fracture or posttraumatic subluxation. Osteopenic bones.    CANAL/FORAMINA: At L1-2, moderate central and severe bilateral foraminal stenosis. At L2-3, severe central stenosis, moderate to severe bilateral foraminal stenosis. At L3-4, severe central and severe bilateral foraminal stenosis. At L4-5, severe central   and severe right foraminal stenosis. At L5-S1, mild central and severe right foraminal stenosis.    PARASPINAL: Right renal pelvis stone measures 9 mm, no hydronephrosis, moderate adjacent chronic fat stranding. Large simple cyst right kidney. Moderate sacroiliac joint degenerative disease.      Impression    IMPRESSION:  1.  No fracture or posttraumatic subluxation.  2.  Multilevel high-grade spinal canal and foraminal stenosis.  3.  Right kidney stone, measures 9 mm. No hydronephrosis. Moderate surrounding chronic fat stranding unchanged from prior CT of 11/26/2023, may be secondary to chronic urinary infection.   CT Pelvis Bone wo Contrast    Narrative    EXAM: CT PELVIS BONE WO CONTRAST  LOCATION: [facility]  DATE: 8/23/2024    INDICATION: Fall, trauma, leg pain  COMPARISON: None.  TECHNIQUE: CT scan of the pelvis was performed without IV contrast. Multiplanar reformats were obtained. Dose reduction techniques were used.  CONTRAST: None.    FINDINGS:    No acute fracture is identified. There is diffuse osseous demineralization.    There is moderate to advanced right hip degenerative arthrosis. Mild left hip degenerative arthrosis. There are also degenerative changes within the lower lumbar spine and at the sacroiliac joints and pubic symphysis.    There is a right hip joint effusion. Vascular atherosclerotic calcifications are noted. There is a large ventral abdominal hernia containing multiple  loops of nondilated bowel.    No free pelvic fluid. No drainable fluid collection.      Impression    IMPRESSION:  1.  No acute fracture is identified. Given the degree of osseous demineralization, MRI would be more sensitive for nondisplaced fracture and should be considered if there is persistent clinical concern.  2.  Moderate to advanced right hip degenerative arthrosis. There is right hip joint effusion.  3.  Additional chronic-appearing findings, as described.

## 2024-08-24 NOTE — PROGRESS NOTES
Observation goals  PRIOR TO DISCHARGE       Comments:   -diagnostic tests and consults completed and resulted- Not met, WOC to see   -vital signs normal or at patient baseline- Met   -safe disposition plan has been identified- Not met, TCU placement

## 2024-08-24 NOTE — CONSULTS
Care Management Initial Consult    General Information  Assessment completed with: Children, Sophia  Type of CM/SW Visit: Initial Assessment    Primary Care Provider verified and updated as needed:     Readmission within the last 30 days:        Reason for Consult: discharge planning  Advance Care Planning: Advance Care Planning Reviewed: no concerns identified          Communication Assessment  Patient's communication style: spoken language (English or Bilingual)    Hearing Difficulty or Deaf: yes        Cognitive  Cognitive/Neuro/Behavioral: .WDL except  Level of Consciousness: alert  Arousal Level: opens eyes spontaneously  Orientation: oriented x 4 (forgetful)  Mood/Behavior: calm, cooperative  Best Language: 0 - No aphasia  Speech: clear    Living Environment:   People in home: alone     Current living Arrangements: apartment      Able to return to prior arrangements: other (see comments)  Living Arrangement Comments: PT recommending TCU    Family/Social Support:  Care provided by: self, homecare agency  Provides care for: no one  Marital Status:   Children          Description of Support System: Supportive    Support Assessment: Lacks necessary supervision and assistance    Current Resources:   Patient receiving home care services: Yes  Skilled Home Care Services: Physical Therapy, Skilled Nursing, Home Health Aid  Community Resources: Home Care  Equipment currently used at home: walker, rolling  Supplies currently used at home:      Employment/Financial:  Employment Status: retired        Financial Concerns:             Does the patient's insurance plan have a 3 day qualifying hospital stay waiver?  Yes     Which insurance plan 3 day waiver is available? Alternative insurance waiver    Will the waiver be used for post-acute placement? Undetermined at this time    Lifestyle & Psychosocial Needs:  Social Determinants of Health     Food Insecurity: Low Risk  (10/26/2023)    Food Insecurity     Within  the past 12 months, did you worry that your food would run out before you got money to buy more?: No     Within the past 12 months, did the food you bought just not last and you didn t have money to get more?: No   Depression: At risk (7/23/2024)    PHQ-2     PHQ-2 Score: 3   Housing Stability: Low Risk  (10/26/2023)    Housing Stability     Do you have housing? : Yes     Are you worried about losing your housing?: No   Tobacco Use: Medium Risk (7/23/2024)    Patient History     Smoking Tobacco Use: Former     Smokeless Tobacco Use: Never     Passive Exposure: Not on file   Financial Resource Strain: Low Risk  (10/26/2023)    Financial Resource Strain     Within the past 12 months, have you or your family members you live with been unable to get utilities (heat, electricity) when it was really needed?: No   Alcohol Use: Not on file   Transportation Needs: Low Risk  (10/26/2023)    Transportation Needs     Within the past 12 months, has lack of transportation kept you from medical appointments, getting your medicines, non-medical meetings or appointments, work, or from getting things that you need?: No   Physical Activity: Not on file   Interpersonal Safety: Low Risk  (1/11/2024)    Interpersonal Safety     Do you feel physically and emotionally safe where you currently live?: Yes     Within the past 12 months, have you been hit, slapped, kicked or otherwise physically hurt by someone?: No     Within the past 12 months, have you been humiliated or emotionally abused in other ways by your partner or ex-partner?: No   Stress: No Stress Concern Present (6/10/2021)    [employer] [place] [employer] - Occupational Stress Questionnaire     Feeling of Stress : Not at all   Social Connections: Unknown (6/10/2021)    Social Connection and Isolation Panel [NHANES]     Frequency of Communication with Friends and Family: Not on file     Frequency of Social Gatherings with Friends and Family: Not on file     Attends  Gnosticism Services: Not on file     Active Member of Clubs or Organizations: Not on file     Attends Club or Organization Meetings: Not on file     Marital Status:    Health Literacy: Not on file       Functional Status:  Prior to admission patient needed assistance:   Dependent ADLs:: Ambulation-walker  Dependent IADLs:: Cleaning, Shopping       Mental Health Status:          Chemical Dependency Status:                Values/Beliefs:  Spiritual, Cultural Beliefs, Gnosticism Practices, Values that affect care:                 Additional Information:  Patient was admitted on 8/23/24 after a fall, per H&P. SW received call from daughter on 8/23/24 by phone before pt arrived to the ED, as daughter had worked with SW prior and hd some questions. Benji Cortes let YANIQUE know that pt was going to be coming to the ED due to multiple falls and current pain. Sophia states she is likely going to start looking into [facility] placement for patient going forward and requested resources. YANIQUE provided resource for [facility], as they assist in locating [facility] placement in the community. YANIQUE explained that since we didn't know if pt would be admitted, or what the work up will be, would be good to connect with outside resources for this. Sophia also mentioned that there have been issues with home care not coming out, they previously had [facility] and stated it was great, and then after discharge from [facility] they set up a different agency. Sophia would like [facility] again. Explained to Sophia that since pt was not admitted to the hospital yet, this would have to go through PCP.    Left VM for benji Cortes today to discuss therapy recommendations for TCU at discharge.     Addendum 1445: Received call back from Sophia, she is in agreement with TCU at discharge and would like referrals near [place], but if more options are needed, is okay with referrals outside of here. She would like to avoid a referral to [place]  at this time. She understands that the TCU will do an insurance auth to check that they will approve another TCU stay. Sophia knows this likely wouldn't happen on the weekend, mainly due to insurance. Sophia has been in contact with [facility] and will be looking into jail options for patient. Sophia thanked worker for assistance and was very appreciative. Sent TCU referrals.     STEF Dominguez  Social Work  [facility]

## 2024-08-24 NOTE — PROGRESS NOTES
[facility]    Medicine Progress Note - Hospitalist Service    Date of Admission:  8/23/2024    Assessment & Plan     Moira Andre is a 89 year old female with PMH of diastolic CHF, CKD4, HTN, recurrent falls, atrial fibrillation s/p ablation and pacemaker, ASCVD, DM2, depression, GERD, asthma, who presents with a fall.     Mechanical fall  Mild rhabdomyolysis  Recurrent falls  Patient has recent frequent falls--recently hospitalized 8/3 to 8/7 for a fall, UTI, then discharged to TCU, and then discharged home independently on 8/19. She then fell on 8/21 and now again 8/23, this time hitting her head and unable to get up. CT head no acute pathology, CT cerbvical lumbar spine no fracture, CT pelvis no clear fracture. Patient reports ongoing left leg spasms and has been taking muscle relaxants and oxycodone at home which increases her risk for a fall. Her falls are likely multifactorial. She may need a higher level of care like [facility] which patient states she is looking for currently.    --Patient has been admitted for further evaluation and management.  --Physical therapy consultation requested this morning.  --Labs reviewed from this morning, showing creatinine of 1.92.  --Baseline creatinine is close to 1.8, will start patient on Ringer lactate at 100 mL/h.  --Will hold Demadex this morning.  --CK levels improving to 132.  --Continue as needed Flexeril.  --Continue scheduled Tylenol and as needed Roxicodone.  --Will evaluate further during the day when she works with physical therapy if she continues to have significant pelvic or left leg pain.  --On admission CT was negative for any pelvic fracture as above.   consultation has been requested, patient might need TCU on discharge.  -- Recheck BMP tomorrow morning 08/25/2024    Mild acute metabolic encephalopathy versus underlying cognitive decline;  Patient seems to be making some confusing statements during the conversation might  be slightly delirious from acute illness, patient also did not have good sleep last night.    Discussed with bedside nursing for close monitoring.  Will continue to monitor, currently patient is getting physical therapy.  Patient will benefit from outpatient neuropsychiatric evaluation and Occupational Therapy evaluation.  Will start treating for preemptive urinary tract infection while cultures are pending due to confusion if it might be contributing to her presentation, see below  As above,  consultation has been requested for safe disposition planning.    Bilateral abdominal folds candidiasis  On examination, patient has candidiasis under the breast fold area and in abdominal folds    -- will place WOC consult , although they might not be able to assess patient over the weekend , abdominal folds seems to have more candidiasis, will order micafungin powder twice daily to be applied.     Possible urinary tract infection;  Patient was recently hospitalized for E. coli UTI.  CT lumbar spine does show 9 mm right kidney stone, no associated hydronephrosis, some fat stranding concerning for chronic urinary tract infection.  As above, due to the concern for mild delirium will treat possible UTI  Right 9 mm stone:      -- will order ceftriaxone 2 g every 24 hours as it might be contributing to her delirium, will monitor her clinical improvement closely, discussed the plan of care in detail with bedside nursing.  -- Monitor urine output  -- Will add procalcitonin to admitting labs.  -- Outpatient urology follow-up    Atrial fibrillation s/p ablation and pacemaker  Diastolic CHF  HTN  ASCVD  Does not appear volume oveloaded here.  -- PTA apixaban, metoprolol  -- Hold PTA statin  -- Hold PTA torsemide today is gently hydrating patient.     CKD4: Cr near baseline , seems to be between 1.8-2.0  --Will hydrate patient slightly due to the concern for urinary tract infection and delirium.  --BMP reviewed from this  "morning, creatinine of 1.9, will check BMP tomorrow.      DM2: Not on any PTA antidiabetics. Last A1c 7.2%.  here  - MD[facility]     Depression: PTA bupropion  GERD: PTA PPI  Asthma: PTA inhaler       Observation Goals: -diagnostic tests and consults completed and resulted, -vital signs normal or at patient baseline, -safe disposition plan has been identified, Nurse to notify provider when observation goals have been met and patient is ready for discharge.  Diet: Combination Diet Low Saturated Fat Na <2400mg Diet, No Caffeine Diet    DVT Prophylaxis: Pneumatic Compression Devices  Charles Catheter: Not present  Lines: None     Cardiac Monitoring: None  Code Status: Full Code      Clinically Significant Risk Factors Present on Admission               # Drug Induced Coagulation Defect: home medication list includes an anticoagulant medication    # Hypertension: Noted on problem list  # Chronic heart failure with preserved ejection fraction: heart failure noted on problem list and last echo with EF >50%       # DMII: A1C = 7.2 % (Ref range: 0.0 - 5.6 %) within past 6 months    # Obesity: Estimated body mass index is 37.57 kg/m  as calculated from the following:    Height as of this encounter: 1.702 m (5' 7\").    Weight as of this encounter: 108.8 kg (239 lb 13.8 oz).       # Financial/Environmental Concerns:    # Asthma: noted on problem list  # Pacemaker present      Disposition Plan     Medically Ready for Discharge: Anticipated Tomorrow      Tegan Nguyen MD  Hospitalist Service  [facility]  Securely message with [employer] (more info)  Text page via [employer] Paging/Directory   ______________________________________________________________________    Interval History     Patient care was assumed this morning, patient was seen and examined.  Patient is lying in bed, still seems tired and sleepy able to answer some questions, will make some confusing statements during the conversation.  Patient is " denying any chest pain or shortness of breath.  Thinks that she might still have some discomfort but has not worked with physical therapy.  Did not sleep very well last night.  Discussed with her regarding continuing antibiotics for now until the culture results are back.  Encouraged her to increase her mobilization.  I also discussed with her regarding holding Demadex today and giving her some gentle fluids.    Physical Exam   Vital Signs: Temp: 98.4  F (36.9  C) Temp src: Oral BP: 131/63 Pulse: 72   Resp: 16 SpO2: 92 % O2 Device: None (Room air)    Weight: 239 lbs 13.77 oz    Physical Exam  Vitals and nursing note reviewed.   Constitutional:       Appearance: She is well-developed.      Comments: Looks tired and fatigued, slightly sleepy but was able to wake up and answer questions, slightly confused   HENT:      Head: Normocephalic and atraumatic.   Eyes:      Pupils: Pupils are equal, round, and reactive to light.   Neck:      Thyroid: No thyromegaly.   Cardiovascular:      Rate and Rhythm: Normal rate and regular rhythm.      Heart sounds: Normal heart sounds.   Pulmonary:      Effort: Pulmonary effort is normal. No respiratory distress.      Breath sounds: Normal breath sounds.   Abdominal:      General: Bowel sounds are normal. There is no distension.      Palpations: Abdomen is soft.      Comments: Right lower abdomen have some inguinal bulging, no pain   Musculoskeletal:         General: No tenderness. Normal range of motion.      Cervical back: Normal range of motion and neck supple.   Skin:     General: Skin is warm and dry.      Comments: Signs of candidiasis under the breast fold area and in abdominal folds   Neurological:      General: No focal deficit present.      Mental Status: She is alert.   Psychiatric:         Behavior: Behavior normal.       Medical Decision Making       52 MINUTES SPENT BY ME on the date of service doing chart review, history, exam, documentation & further activities per the  note.      Data     I have personally reviewed the following data over the past 24 hrs:    8.3  \   11.9   / 272     140 104 47.4 (H) /  139 (H)   4.3 24 1.92 (H) \     ALT: 8 AST: 14 AP: 80 TBILI: 0.6   ALB: 4.1 TOT PROTEIN: 8.1 LIPASE: N/A     TSH: 2.01 T4: N/A A1C: N/A       Imaging results reviewed over the past 24 hrs:   Recent Results (from the past 24 hour(s))   Head CT w/o contrast    Narrative    EXAM: CT HEAD W/O CONTRAST  LOCATION: [facility]  DATE: 8/23/2024    INDICATION: Frequent falls with forehead contusion.  COMPARISON: CT 8/6/2024.  TECHNIQUE: Routine CT Head without IV contrast. Multiplanar reformats. Dose reduction techniques were used.    FINDINGS:  INTRACRANIAL CONTENTS: No intracranial hemorrhage, extraaxial collection, or mass effect.  No CT evidence of acute infarct. Small chronic lacunar infarct left basal ganglia. Mild to moderate presumed chronic small vessel ischemic changes. Mild to   moderate generalized volume loss. No hydrocephalus.     VISUALIZED ORBITS/SINUSES/MASTOIDS: No intraorbital abnormality. No paranasal sinus mucosal disease. No middle ear or mastoid effusion.    BONES/SOFT TISSUES: No acute abnormality.      Impression    IMPRESSION:  1.  No CT evidence for acute intracranial process.  2.  Brain atrophy and presumed chronic microvascular ischemic changes as above.   CT Cervical Spine w/o Contrast    Narrative    EXAM: CT CERVICAL SPINE W/O CONTRAST  LOCATION: [facility]  DATE: 8/23/2024    INDICATION: Frequent fall, head injury.  COMPARISON: None.  TECHNIQUE: Routine CT Cervical Spine without IV contrast. Multiplanar reformats. Dose reduction techniques were used.    FINDINGS:  VERTEBRA: Normal vertebral body heights. Anterolisthesis C4-C5, reversal of lordosis, apex at C5-C6.. No fracture or posttraumatic subluxation.     CANAL/FORAMINA: At C3-4, severe left foraminal stenosis. At C4-5, moderate left foraminal stenosis.  At C5-6, moderate right foraminal stenosis.     PARASPINAL: No extraspinal abnormality.      Impression    IMPRESSION:  1.  No fracture or posttraumatic subluxation.     CT Lumbar Spine w/o Contrast    Narrative    EXAM: CT LUMBAR SPINE W/O CONTRAST  LOCATION: [facility]  DATE: 8/23/2024    INDICATION: Fall, back pain.  COMPARISON: CT 11/26/2024.  TECHNIQUE: Routine CT Lumbar Spine without IV contrast. Multiplanar reformats. Dose reduction techniques were used.     FINDINGS:  VERTEBRA: Grade 1 anterolisthesis L3-L4 and L5-S1. Normal vertebral body heights. No fracture or posttraumatic subluxation. Osteopenic bones.    CANAL/FORAMINA: At L1-2, moderate central and severe bilateral foraminal stenosis. At L2-3, severe central stenosis, moderate to severe bilateral foraminal stenosis. At L3-4, severe central and severe bilateral foraminal stenosis. At L4-5, severe central   and severe right foraminal stenosis. At L5-S1, mild central and severe right foraminal stenosis.    PARASPINAL: Right renal pelvis stone measures 9 mm, no hydronephrosis, moderate adjacent chronic fat stranding. Large simple cyst right kidney. Moderate sacroiliac joint degenerative disease.      Impression    IMPRESSION:  1.  No fracture or posttraumatic subluxation.  2.  Multilevel high-grade spinal canal and foraminal stenosis.  3.  Right kidney stone, measures 9 mm. No hydronephrosis. Moderate surrounding chronic fat stranding unchanged from prior CT of 11/26/2023, may be secondary to chronic urinary infection.   CT Pelvis Bone wo Contrast    Narrative    EXAM: CT PELVIS BONE WO CONTRAST  LOCATION: [facility]  DATE: 8/23/2024    INDICATION: Fall, trauma, leg pain  COMPARISON: None.  TECHNIQUE: CT scan of the pelvis was performed without IV contrast. Multiplanar reformats were obtained. Dose reduction techniques were used.  CONTRAST: None.    FINDINGS:    No acute fracture is identified. There is  diffuse osseous demineralization.    There is moderate to advanced right hip degenerative arthrosis. Mild left hip degenerative arthrosis. There are also degenerative changes within the lower lumbar spine and at the sacroiliac joints and pubic symphysis.    There is a right hip joint effusion. Vascular atherosclerotic calcifications are noted. There is a large ventral abdominal hernia containing multiple loops of nondilated bowel.    No free pelvic fluid. No drainable fluid collection.      Impression    IMPRESSION:  1.  No acute fracture is identified. Given the degree of osseous demineralization, MRI would be more sensitive for nondisplaced fracture and should be considered if there is persistent clinical concern.  2.  Moderate to advanced right hip degenerative arthrosis. There is right hip joint effusion.  3.  Additional chronic-appearing findings, as described.     Recent Labs   Lab 08/24/24  0741 08/24/24  0703 08/24/24  0202 08/23/24  2225 08/23/24  1629   WBC  --  8.3  --   --  11.0   HGB  --  11.9  --   --  12.9   MCV  --  86  --   --  84   PLT  --  272  --   --  369   NA  --  140  --   --  140   POTASSIUM  --  4.3  --   --  4.3   CHLORIDE  --  104  --   --  99   CO2  --  24  --   --  24   BUN  --  47.4*  --   --  51.3*   CR  --  1.92*  --   --  2.08*   ANIONGAP  --  12  --   --  17*   TELLY  --  9.7  --   --  10.4   * 141* 154*   < > 191*   ALBUMIN  --   --   --   --  4.1   PROTTOTAL  --   --   --   --  8.1   BILITOTAL  --   --   --   --  0.6   ALKPHOS  --   --   --   --  80   ALT  --   --   --   --  8   AST  --   --   --   --  14    < > = values in this interval not displayed.

## 2024-08-24 NOTE — PLAN OF CARE
Goal Outcome Evaluation:  PRIMARY Concern: Mechanical fall   SAFETY RISK Concerns (fall risk, behaviors, etc.):  Fall risk    Isolation/Type: None   Tests/Procedures for NEXT shift: None   Consults? (Pending/following, signed-off?) SW consult pending   Where is patient from? (Home[facility] TCU, etc.[facility]   Other Important info for NEXT shift: Pt needs WOC consult   Anticipated DC date & active delays: TBD     SUMMARY NOTE:    Orientation/Cognitive: A&O X4, Forgetful   Observation Goals (Met/ Not Met): Not met   Mobility Level/Assist Equipment: Not OOB this shift. Ax1-2 GB /walker up to BSC   Antibiotics & Plan (IV/po, length of tx left): Given Ceftriaxone in ED, hold further antibiotics pending culture (see hospitalist note)   Pain Management: Schedule tylenol   Tele/VS/O2: VSS on RA   ABNL Lab/BG: ZG=[id number]  Diet: Low Saturated Fat Na <2400mg Diet, No Caffeine Diet  Bowel/Bladder: Incontinent, Purewick in place (Only at night)  Skin Concerns: redness to abdominal and breast fold. Scattered bruises   Drains/Devices: PIV SL, Purewick   Patient Stated Goal for Today: Rest       Observation goals  PRIOR TO DISCHARGE       Comments:   -diagnostic tests and consults completed and resulted- Not met   -vital signs normal or at patient baseline- Met   -safe disposition plan has been identified- Not met

## 2024-08-24 NOTE — PROVIDER NOTIFICATION
MD Notification    Notified Person: MD    Notified Person Name: Dr. Jozef Oquendo     Notification Date/Time:    Notification Interaction:     Purpose of Notification:  Short stay room 507   Could you please order WOC consult. Pt has redness to abdominal and breast fold  Orders Received:    Comments:

## 2024-08-24 NOTE — PROGRESS NOTES
RECEIVING UNIT ED HANDOFF REVIEW    ED Nurse Handoff Report was reviewed by: Jourdan Andrade RN on August 23, 2024 at 8:55 PM

## 2024-08-24 NOTE — PLAN OF CARE
Goal Outcome Evaluation:  PRIMARY Concern: Mechanical fall   SAFETY RISK Concerns (fall risk, behaviors, etc.):  Fall risk    Isolation/Type: None   Tests/Procedures for NEXT shift: None   Consults? (Pending/following, signed-off?) SW consult pending   Where is patient from? (Home, TCU, etc.): assisted   Other Important info for NEXT shift: PT A/O but also making statements about a 'gun show going on at the hospital today'. On and off crying without cause.   Anticipated DC date & active delays: TBD PT rec. TCU    SUMMARY NOTE:    Orientation/Cognitive: A&O X4, Forgetful   Observation Goals (Met/ Not Met): Not met   Mobility Level/Assist Equipment: Not OOB this shift. Ax1-2 GB /walker up to BSC   Antibiotics & Plan (IV/po, length of tx left): restarted ceftriaxone due to delirium like symptoms along positive UA still waiting on cultures.   Pain Management: Schedule tylenol, oxy x1 reporting pain in bilateral hips and knees  Tele/VS/O2: VSS on RA   ABNL Lab/BG: BG= 139, 141 - creat 1.92  Diet: Low Saturated Fat Na <2400mg Diet, No Caffeine Diet  Bowel/Bladder: Incontinent, Purewick in place  Skin Concerns: redness to abdominal and breast fold, powder applied. Scattered bruises   Drains/Devices: PIV  ml/hr, Purewick   Patient Stated Goal for Today: Rest

## 2024-08-25 ENCOUNTER — APPOINTMENT (OUTPATIENT)
Dept: GENERAL RADIOLOGY | Facility: CLINIC | Age: 89
End: 2024-08-25
Attending: STUDENT IN AN ORGANIZED HEALTH CARE EDUCATION/TRAINING PROGRAM
Payer: COMMERCIAL

## 2024-08-25 LAB
ANION GAP SERPL CALCULATED.3IONS-SCNC: 19 MMOL/L (ref 7–15)
BACTERIA UR CULT: NORMAL
BUN SERPL-MCNC: 37.6 MG/DL (ref 8–23)
CALCIUM SERPL-MCNC: 9.6 MG/DL (ref 8.8–10.4)
CHLORIDE SERPL-SCNC: 103 MMOL/L (ref 98–107)
CREAT SERPL-MCNC: 1.71 MG/DL (ref 0.51–0.95)
EGFRCR SERPLBLD CKD-EPI 2021: 28 ML/MIN/1.73M2
GLUCOSE BLDC GLUCOMTR-MCNC: 103 MG/DL (ref 70–99)
GLUCOSE BLDC GLUCOMTR-MCNC: 133 MG/DL (ref 70–99)
GLUCOSE BLDC GLUCOMTR-MCNC: 154 MG/DL (ref 70–99)
GLUCOSE BLDC GLUCOMTR-MCNC: 176 MG/DL (ref 70–99)
GLUCOSE BLDC GLUCOMTR-MCNC: 215 MG/DL (ref 70–99)
GLUCOSE SERPL-MCNC: 157 MG/DL (ref 70–99)
HCO3 SERPL-SCNC: 17 MMOL/L (ref 22–29)
POTASSIUM SERPL-SCNC: 4.5 MMOL/L (ref 3.4–5.3)
SODIUM SERPL-SCNC: 139 MMOL/L (ref 135–145)

## 2024-08-25 PROCEDURE — 96376 TX/PRO/DX INJ SAME DRUG ADON: CPT

## 2024-08-25 PROCEDURE — 36415 COLL VENOUS BLD VENIPUNCTURE: CPT | Performed by: INTERNAL MEDICINE

## 2024-08-25 PROCEDURE — 73552 X-RAY EXAM OF FEMUR 2/>: CPT | Mod: RT

## 2024-08-25 PROCEDURE — 250N000013 HC RX MED GY IP 250 OP 250 PS 637: Performed by: INTERNAL MEDICINE

## 2024-08-25 PROCEDURE — 82962 GLUCOSE BLOOD TEST: CPT

## 2024-08-25 PROCEDURE — 80048 BASIC METABOLIC PNL TOTAL CA: CPT | Performed by: INTERNAL MEDICINE

## 2024-08-25 PROCEDURE — G0378 HOSPITAL OBSERVATION PER HR: HCPCS

## 2024-08-25 PROCEDURE — 250N000011 HC RX IP 250 OP 636: Performed by: INTERNAL MEDICINE

## 2024-08-25 PROCEDURE — 99233 SBSQ HOSP IP/OBS HIGH 50: CPT | Performed by: INTERNAL MEDICINE

## 2024-08-25 PROCEDURE — 96366 THER/PROPH/DIAG IV INF ADDON: CPT

## 2024-08-25 RX ORDER — CEFTRIAXONE 2 G/1
2 INJECTION, POWDER, FOR SOLUTION INTRAMUSCULAR; INTRAVENOUS EVERY 24 HOURS
Status: COMPLETED | OUTPATIENT
Start: 2024-08-25 | End: 2024-08-25

## 2024-08-25 RX ORDER — OXYCODONE HYDROCHLORIDE 5 MG/1
5 TABLET ORAL EVERY 4 HOURS PRN
Status: DISCONTINUED | OUTPATIENT
Start: 2024-08-25 | End: 2024-08-26

## 2024-08-25 RX ADMIN — ACETAMINOPHEN 1000 MG: 500 TABLET, FILM COATED ORAL at 08:13

## 2024-08-25 RX ADMIN — ACETAMINOPHEN 1000 MG: 500 TABLET, FILM COATED ORAL at 14:55

## 2024-08-25 RX ADMIN — FLUTICASONE FUROATE AND VILANTEROL TRIFENATATE 1 PUFF: 100; 25 POWDER RESPIRATORY (INHALATION) at 08:21

## 2024-08-25 RX ADMIN — CEFTRIAXONE SODIUM 2 G: 2 INJECTION, POWDER, FOR SOLUTION INTRAMUSCULAR; INTRAVENOUS at 19:52

## 2024-08-25 RX ADMIN — METOPROLOL TARTRATE 75 MG: 50 TABLET, FILM COATED ORAL at 08:14

## 2024-08-25 RX ADMIN — OXYCODONE HYDROCHLORIDE 2.5 MG: 5 TABLET ORAL at 00:09

## 2024-08-25 RX ADMIN — BUPROPION HYDROCHLORIDE 100 MG: 100 TABLET, FILM COATED, EXTENDED RELEASE ORAL at 08:13

## 2024-08-25 RX ADMIN — MICONAZOLE NITRATE: 2 POWDER TOPICAL at 19:52

## 2024-08-25 RX ADMIN — MICONAZOLE NITRATE: 2 POWDER TOPICAL at 08:21

## 2024-08-25 RX ADMIN — OXYCODONE HYDROCHLORIDE 5 MG: 5 TABLET ORAL at 14:55

## 2024-08-25 RX ADMIN — PANTOPRAZOLE SODIUM 20 MG: 20 TABLET, DELAYED RELEASE ORAL at 08:14

## 2024-08-25 RX ADMIN — TORSEMIDE 20 MG: 20 TABLET ORAL at 16:22

## 2024-08-25 RX ADMIN — APIXABAN 2.5 MG: 2.5 TABLET, FILM COATED ORAL at 19:52

## 2024-08-25 RX ADMIN — DULOXETINE HYDROCHLORIDE 60 MG: 60 CAPSULE, DELAYED RELEASE ORAL at 08:14

## 2024-08-25 RX ADMIN — METOPROLOL TARTRATE 75 MG: 50 TABLET, FILM COATED ORAL at 19:52

## 2024-08-25 RX ADMIN — CYCLOBENZAPRINE 5 MG: 5 TABLET, FILM COATED ORAL at 19:55

## 2024-08-25 RX ADMIN — ACETAMINOPHEN 1000 MG: 500 TABLET, FILM COATED ORAL at 19:52

## 2024-08-25 RX ADMIN — CYCLOBENZAPRINE 5 MG: 5 TABLET, FILM COATED ORAL at 08:14

## 2024-08-25 RX ADMIN — APIXABAN 2.5 MG: 2.5 TABLET, FILM COATED ORAL at 08:14

## 2024-08-25 RX ADMIN — OXYCODONE HYDROCHLORIDE 2.5 MG: 5 TABLET ORAL at 08:14

## 2024-08-25 RX ADMIN — TORSEMIDE 20 MG: 20 TABLET ORAL at 08:14

## 2024-08-25 ASSESSMENT — ACTIVITIES OF DAILY LIVING (ADL)
ADLS_ACUITY_SCORE: 49
ADLS_ACUITY_SCORE: 50
ADLS_ACUITY_SCORE: 49
ADLS_ACUITY_SCORE: 54
ADLS_ACUITY_SCORE: 54
ADLS_ACUITY_SCORE: 50
ADLS_ACUITY_SCORE: 50
ADLS_ACUITY_SCORE: 49

## 2024-08-25 NOTE — PROGRESS NOTES
Observation goals  PRIOR TO DISCHARGE        Comments: -diagnostic tests and consults completed and resulted Not Met  -vital signs normal or at patient baseline Not Met  -safe disposition plan has been identified Not Met  Nurse to notify provider when observation goals have been met and patient is ready for discharge.

## 2024-08-25 NOTE — PLAN OF CARE
PRIMARY Concern: Fall  SAFETY RISK Concerns (fall risk, behaviors, etc.): fall risk, anxious, tearful      Isolation/Type: none  Tests/Procedures for NEXT shift: Lumbar MRI  Consults? (Pending/following, signed-off?) Ortho following, PT/OT, SW, WOC  Where is patient from? (Home, TCU, etc.): alone in apartment  Other Important info for NEXT shift: needs MRI but has a pacemaker  Anticipated DC date & active delays: TBD  _____________________________________________________________________________  SUMMARY NOTE:  Orientation/Cognitive: A&Ox4, intermittently forgetful. Very emotional and tearful  Observation Goals (Met/ Not Met): Not Met  Mobility Level/Assist Equipment: Ax2, gb, walker  Antibiotics & Plan (IV/po, length of tx left): IV rocephin q24h  Pain Management: PRN oxy, PRN flexeril, scheduled tylenol  Tele/VS/O2: VSS on RA  ABNL Lab/BG: , 215, 103  Diet: tolerating a low sat fat diet w/ no caffeine  Bowel/Bladder: Purewick in place  Skin Concerns: redness under abd folds  Drains/Devices: PIV SL  Patient Stated Goal for Today: pain control

## 2024-08-25 NOTE — PROGRESS NOTES
[facility]    Medicine Progress Note - Hospitalist Service    Date of Admission:  8/23/2024    Assessment & Plan     Moira Andre is a 89 year old female with PMH of diastolic CHF, CKD4, HTN, recurrent falls, atrial fibrillation s/p ablation and pacemaker, ASCVD, DM2, depression, GERD, asthma, who presents with a fall.     Mechanical fall  Right hip and leg discomfort  Mild rhabdomyolysis  Recurrent falls  Patient has recent frequent falls--recently hospitalized 8/3 to 8/7 for a fall, UTI, then discharged to TCU, and then discharged home independently on 8/19. She then fell on 8/21 and now again 8/23, this time hitting her head and unable to get up. CT head no acute pathology, CT cerbvical lumbar spine no fracture, CT pelvis no clear fracture. Patient reports ongoing left leg spasms and has been taking muscle relaxants and oxycodone at home which increases her risk for a fall. Her falls are likely multifactorial. She may need a higher level of care like [facility] which patient states she is looking for currently.    -- Patient has been admitted for further evaluation and management.  -- Physical therapy consulted, recommending TCU  -- Initial creatinine was 1.9 yesterday, patient was continued on IV fluids now improved to 1.7  -- Patient is off of IV fluids, Demadex can be started today.  -- Will hold Demadex this morning.  --CK levels improved  --Continue as needed Flexeril.  --Continue scheduled Tylenol and as needed Roxicodone.  --Patient is still complaining of significant discomfort in right hip area, she has been tolerating 2.5 mg of Roxicodone, will increase her oxycodone frequency to every 6 hours 2.5 mg for moderate dose and will also add 5 mg of oxycodone for severe pain every 6 hours while monitoring patient closely in hospital to see if patient is able to tolerate his medications without getting somnolent.  --Patient is concerned about her hip discomfort, discussed x-ray and CT scan  results  -- On clinical examination, patient might have significant osteoarthritis of the hip which might be causing discomfort in the hip and inguinal area.  -- Discussed about orthopedic surgery consultation to see if patient will benefit from hip MRI  -- Care coordinators are following closely for safe disposition planning, patient will not be able to leave this weekend as awaiting insurance authorization  -- Appreciate orthopedic surgery evaluation can be seen on 08/26/2024 if busy over the weekend  -- Patient has been on Eliquis, no hematoma was noticed on initial imaging      Mild acute metabolic encephalopathy versus underlying cognitive decline;  On 08/24, patient seemed more sleepy was not able to hold a conversation and was falling asleep also seemed more confused.  On 08/25, patient seems to be more alert and awake, seems to be close to her baseline, complaining of discomfort.  So far tolerating current dose of pain medications without somnolence but thinks that her pain is not controlled well as above.    --Discussed with bedside nursing for close monitoring.  --Patient will benefit from outpatient neuropsychiatric evaluation and Occupational Therapy evaluation.  --Discussed with patient regarding urine cultures not indicated of infection.  --As above,  consultation has been requested for safe disposition planning, have been in touch with daughter, appreciate their help    Bilateral abdominal folds candidiasis  On examination, patient has candidiasis under the breast fold area and in abdominal folds    -- will place WOC consult , although they might not be able to assess patient over the weekend , abdominal folds seems to have more candidiasis, will order micafungin powder twice daily to be applied.     Possible urinary tract infection;  Patient was recently hospitalized for E. coli UTI.  CT lumbar spine does show 9 mm right kidney stone, no associated hydronephrosis, some fat stranding  concerning for chronic urinary tract infection.  As above, due to the concern for mild delirium will treat possible UTI  Right 9 mm stone:      -- Ordered ceftriaxone 2 g every 24 hours on admission if UTI might be contributing to her delirium  -- Added procalcitonin to the admission labs which came back in normal range  -- Cultures showing mixed of urogenital tyler  -- Discussed with patient that she does not have UTI, will stop IV ceftriaxone after last dose.    Atrial fibrillation s/p ablation and pacemaker  Diastolic CHF  HTN  ASCVD  Does not appear volume oveloaded here.  -- PTA apixaban, metoprolol  -- Hold PTA statin  -- Hold PTA torsemide today is gently hydrating patient.     CKD4: Cr near baseline , seems to be between 1.8-2.0  --Patient received hydration in the beginning, now creatinine is down to 1.71 this morning.  Off fluids, will start patient back on Demadex.     DM2: Not on any PTA antidiabetics. Last A1c 7.2%.  here  - MD[facility]     Depression: PTA bupropion  GERD: PTA PPI  Asthma: PTA inhaler       Observation Goals: -diagnostic tests and consults completed and resulted, -vital signs normal or at patient baseline, -safe disposition plan has been identified, Nurse to notify provider when observation goals have been met and patient is ready for discharge.  Diet: Combination Diet Low Saturated Fat Na <2400mg Diet, No Caffeine Diet    DVT Prophylaxis: Pneumatic Compression Devices  Charles Catheter: Not present  Lines: None     Cardiac Monitoring: None  Code Status: Full Code      Clinically Significant Risk Factors Present on Admission             # Anion Gap Metabolic Acidosis: Highest Anion Gap = 19 mmol/L in last 2 days, will monitor and treat as appropriate   # Drug Induced Coagulation Defect: home medication list includes an anticoagulant medication    # Hypertension: Noted on problem list  # Chronic heart failure with preserved ejection fraction: heart failure noted on problem list and last  "echo with EF >50%       # DMII: A1C = 7.2 % (Ref range: 0.0 - 5.6 %) within past 6 months    # Obesity: Estimated body mass index is 37.57 kg/m  as calculated from the following:    Height as of this encounter: 1.702 m (5' 7\").    Weight as of this encounter: 108.8 kg (239 lb 13.8 oz).         # Financial/Environmental Concerns:    # Asthma: noted on problem list  # Pacemaker present      Disposition Plan     Medically Ready for Discharge: Anticipated Tomorrow      Tegan Nguyen MD  Hospitalist Service  [facility]  Securely message with [employer] (more info)  Text page via [email or web address] Paging/Directory   ______________________________________________________________________    Interval History     Patient was seen and examined, sitting in bed, looks significantly clinically improved.  Bedside nursing present.  Patient is much more alert and awake, able to have conversation, very much uncomfortable and in the current position would like to change the position and sit in chair, complaining of right hip discomfort.  Discussed with her regarding initial imaging's which have been negative.  Patient is having this discomfort from June.  Discussed about possible orthopedic surgery evaluation and further discussion if patient will benefit from MRI.  We also discussed about adjusting her pain medications, stopping IV fluids and starting her back on Demadex.    Physical Exam   Vital Signs: Temp: 98.2  F (36.8  C) Temp src: Oral BP: (!) 173/85 Pulse: 75   Resp: 18 SpO2: 93 % O2 Device: None (Room air)    Weight: 239 lbs 13.77 oz    Physical Exam  Vitals and nursing note reviewed.   Constitutional:       Appearance: She is well-developed.      Comments: Looks tired and fatigued, slightly sleepy but was able to wake up and answer questions, slightly confused   HENT:      Head: Normocephalic and atraumatic.   Eyes:      Pupils: Pupils are equal, round, and reactive to light.   Neck:      Thyroid: No thyromegaly. "   Cardiovascular:      Rate and Rhythm: Normal rate and regular rhythm.      Heart sounds: Normal heart sounds.   Pulmonary:      Effort: Pulmonary effort is normal. No respiratory distress.      Breath sounds: Normal breath sounds.   Abdominal:      General: Bowel sounds are normal. There is no distension.      Palpations: Abdomen is soft.      Comments: Right lower abdomen have some inguinal bulging, no pain   Musculoskeletal:         General: No tenderness. Normal range of motion.      Cervical back: Normal range of motion and neck supple.   Skin:     General: Skin is warm and dry.      Comments: Signs of candidiasis under the breast fold area and in abdominal folds   Neurological:      General: No focal deficit present.      Mental Status: She is alert.   Psychiatric:         Behavior: Behavior normal.       Medical Decision Making       53 MINUTES SPENT BY ME on the date of service doing chart review, history, exam, documentation & further activities per the note.      Data     I have personally reviewed the following data over the past 24 hrs:    N/A  \   N/A   / N/A     139 103 37.6 (H) /  154 (H)   4.5 17 (L) 1.71 (H) \     Procal: N/A CRP: N/A Lactic Acid: N/A         Imaging results reviewed over the past 24 hrs:   No results found for this or any previous visit (from the past 24 hour(s)).    Recent Labs   Lab 08/25/24  0732 08/25/24  0656 08/25/24  0241 08/24/24  0741 08/24/24  0703 08/23/24  2225 08/23/24  1629   WBC  --   --   --   --  8.3  --  11.0   HGB  --   --   --   --  11.9  --  12.9   MCV  --   --   --   --  86  --  84   PLT  --   --   --   --  272  --  369   NA  --  139  --   --  140  --  140   POTASSIUM  --  4.5  --   --  4.3  --  4.3   CHLORIDE  --  103  --   --  104  --  99   CO2  --  17*  --   --  24  --  24   BUN  --  37.6*  --   --  47.4*  --  51.3*   CR  --  1.71*  --   --  1.92*  --  2.08*   ANIONGAP  --  19*  --   --  12  --  17*   TELLY  --  9.6  --   --  9.7  --  10.4   * 157*  133*   < > 141*   < > 191*   ALBUMIN  --   --   --   --   --   --  4.1   PROTTOTAL  --   --   --   --   --   --  8.1   BILITOTAL  --   --   --   --   --   --  0.6   ALKPHOS  --   --   --   --   --   --  80   ALT  --   --   --   --   --   --  8   AST  --   --   --   --   --   --  14    < > = values in this interval not displayed.

## 2024-08-25 NOTE — PROGRESS NOTES
PRIMARY Concern: Mechanical fall   SAFETY RISK Concerns (fall risk, behaviors, etc.):  Fall risk, emotional at times.  Isolation/Type: None   Tests/Procedures for NEXT shift: None   Consults? (Pending/following, signed-off?) SW following, WOC to see  Where is patient from? (Home, TCU, etc.): [facility]   Other Important info for NEXT shift: PT recommending TCU, SW sent out referrals   Anticipated DC date & active delays: TBD     SUMMARY NOTE:    Orientation/Cognitive: A&Ox4, Forgetful. Very emotional at times.   Observation Goals (Met/ Not Met): Not met   Mobility Level/Assist Equipment: Ax1-2 GB /walker up to BSC   Antibiotics & Plan (IV/po, length of tx left): IV rocephin Q24  Pain Management: Scheduled tylenol, PRN flexeril given x1  Tele/VS/O2: VSS on RA   ABNL Lab/BG: WQ=934,132. Urine cultures pending  Diet: Low Saturated Fat Na <2400mg Diet, No Caffeine Diet  Bowel/Bladder: Incontinent, Purewick in place   Skin Concerns: Redness to abdominal and breast fold. Scattered bruises. WOC consult placed  Drains/Devices: PIV SL, Purewick   Patient Stated Goal for Today: Rest

## 2024-08-25 NOTE — PLAN OF CARE
Goal Outcome Evaluation:  PRIMARY Concern: Mechanical fall   SAFETY RISK Concerns (fall risk, behaviors, etc.): Fall risk    Isolation/Type: None   Tests/Procedures for NEXT shift: None   Consults? (Pending/following, signed-off?) PT recommending TCU, SW sent out referrals. WOC consult pending   Where is patient from? (Home, TCU, etc.): [facility]   Other Important info for NEXT shift: None   Anticipated DC date & active delays: TBD      SUMMARY NOTE:     Orientation/Cognitive: A&O X4, Forgetful   Observation Goals (Met/ Not Met): Not met   Mobility Level/Assist Equipment: Not OOB this shift. Ax1-2 GB /walker up to BSC   Antibiotics & Plan (IV/po, length of tx left): IV rocephin Q24   Pain Management: PRN oxycodone   Tele/VS/O2: VSS on RA   ABNL Lab/BG: [id number]  Diet: Low Saturated Fat Na <2400mg Diet, No Caffeine Diet  Bowel/Bladder: Incontinent, Purewick in place (Only at night)  Skin Concerns: redness to abdominal and breast fold. Scattered bruises   Drains/Devices: PIV SL, Purewick   Patient Stated Goal for Today: Rest           Observation goals  PRIOR TO DISCHARGE       Comments:   -diagnostic tests and consults completed and resulted- Not met   -vital signs normal or at patient baseline- Met   -safe disposition plan has been identified- Not met

## 2024-08-25 NOTE — CONSULTS
[facility]    Orthopedic Consultation    Moira Andre MRN# [id number]   Age: 89 year old YOB: 1933     Date of Admission:  8/23/2024    Reason for consult: Significant right hip discomfort, initial x-ray and CT negative for fracture, has been ongoing since June, significant decrease mobilization and falls       Requesting physician: Tegan Nguyen MD        Level of consult: One-time consult to assist in determining a diagnosis and to recommend an appropriate treatment plan           Assessment and Plan:   Assessment:   This is a markedly pleasant 89-year-old female admitted to [facility] for mechanical fall, mild rhabdomyolysis, right hip and right lower extremity discomfort.  She does have a history of receiving epidural steroid injections of her low back.  She receives these approximately every 5 months with the last injection being 2 months ago.  She describes pain radiating from posterior hip to the knee.  MRI will be ordered for further evaluation of the right hip, x-ray ordered to evaluate the right knee.  CT of lumbar spine was already completed with findings of stenosis.  If MRI of the hip is negative with no significant findings I do believe her pain is associated with a history of back pain      Plan:   -Right hip MRI without contrast under sedation per patient request and history with MRI.  Patient does have a pacemaker and will need the radiology team to look into this.  -Right femur x-ray to evaluate right knee  -Continue pain control per primary team    Please contact orthopedic trauma team if any questions or concerns arise.           Chief Complaint:   Right hip pain         History of Present Illness:   Moira Andre is a 89 year old female with PMH of diastolic CHF, CKD4, HTN, recurrent falls, atrial fibrillation s/p ablation and pacemaker, ASCVD, DM2, depression, GERD, asthma, who presents with a fall.  She was admitted 2 days ago on 8/23/2024 and  is being managed for UTI, kidney stone, yeast infection, encephalopathy in addition to the recurrent falls.  She is afebrile.  Our orthopedic trauma team was consulted today to evaluate for what appears to be right hip pain and leg discomfort.  Upon consultation with the patient she does endorse having a history of epidural steroid injections of her back every 5 months the last injection 2 months ago.  She has been experiencing right lower extremity leg pain for the past month.  She was able to ambulate and walk on the leg for a period of time with feeling discomfort.  This pain began to worsen 2 days ago now.  She reports that this started this she was trying to push a heavy reclining chair at home, within that movement she then began to feel immediate hip pain expanding down into her right knee.  She is now unable to bear weight due to the amount of pain.  She was unable to work with physical therapy this morning due to the amount of pain.          Past Medical History:     Past Medical History:   Diagnosis Date    Aortic valve sclerosis     heart murmur, no AS    Arrhythmia     PAT, PVC    Aspirin allergy     Plavix use long term    Asthma     CKD (chronic kidney disease) stage 3, GFR 30-59 ml/min (H)     x 2007 atleast    Congestive heart failure, unspecified     Depression     Diabetes mellitus (H) 2010    Diastolic dysfunction, left ventricle 2013    grade 2, nl ef    HTN (hypertension)     Lactic acidosis 08/2018    due to dehydration and metformin    Migraine headache     Mitral stenosis     mild, 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    Myocardial infarction (H) 9/2007, cath 2013 ml    BMS: stent to OM, diag, nl EF, echo /C angia 2013 , f/u cath no lesion >40%    Nephrolithiasis     right side    OA (osteoarthritis) of knee     Obesity     Rheumatoid arthritis flare (H)     prednisone    Sleep apnea     restarted using cpap 2017    TIA (transient ischaemic attack)     Ventral hernia, unspecified, without mention of obstruction  or gangrene              Past Surgical History:     Past Surgical History:   Procedure Laterality Date    APPENDECTOMY      BIOPSY BREAST      x2 -needle & lumpectomy-benign    CHOLECYSTECTOMY      CORONARY ANGIOGRAPHY ADULT ORDER  2007    Bare metal stent to OM1, Diagonal patent     CORONARY ANGIOGRAPHY ADULT ORDER  2007    [place] stent to Diagonal    EP ABLATION AV NODE N/A 2024    Procedure: Ablation Atrioventricular Node;  Surgeon: Davion Baron MD;  Location:  [facility]ATH [facility]    EP PACEMAKER DEVICE & LEAD IMPLANT- RIGHT VENTRICULAR N/A 2024    Procedure: Pacemaker Device & Lead Implant- Right ventricular;  Surgeon: Davion Baron MD;  Location:  [facility] CATH LAB    HC LEFT HEART CATHETERIZATION  2013    Moderate CAD    HYSTERECTOMY TOTAL ABDOMINAL      ORTHOPEDIC SURGERY      knee replacement on right side (), Left side ()    RELEASE CARPAL TUNNEL      right and left    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             Social History:     Social History     Tobacco Use    Smoking status: Former     Current packs/day: 0.00     Average packs/day: 0.3 packs/day for 1.3 years (0.3 ttl pk-yrs)     Types: Cigarettes     Start date:      Quit date: 1973     Years since quittin.3    Smokeless tobacco: Never   Substance Use Topics    Alcohol use: Yes     Alcohol/week: 0.0 - 1.0 standard drinks of alcohol     Comment: rarely             Family History:     Family History   Problem Relation Age of Onset    Neurologic Disorder Mother         MS - at 60's    C.A.D. Father          at 8o's, ? prostate ca    Breast Cancer No family hx of     Cancer - colorectal No family hx of              Immunizations:     VACCINE/DOSE   Diptheria   DPT   DTAP   HBIG   Hepatitis A   Hepatitis B   HIB   Influenza   Measles   Meningococcal   MMR   Mumps   Pneumococcal   Polio   Rubella   Small Pox   TDAP   Varicella   Zoster             Allergies:      Allergies   Allergen Reactions    Aspirin Hives     Reaction occurred during childhood.     Lidocaine Itching    Lisinopril Cough    Losartan      Hyperkalemia      Metformin      Elevated lactic acid    Minocycline      Yellow Dye Allergy. Minocycline has Yellow Dye #10.    Mounjaro [Tirzepatide] Diarrhea and GI Disturbance    Salicylates Hives    Yellow Dye Hives     Rxn to yellow tablet. Eyes swelled shut.     Yellow Dyes (Non-Tartrazine) Hives             Medications:     Current Facility-Administered Medications   Medication Dose Route Frequency Provider Last Rate Last Admin    acetaminophen (TYLENOL) tablet 1,000 mg  1,000 mg Oral TID Jozef Oquendo MD   1,000 mg at 08/25/24 1455    apixaban ANTICOAGULANT (ELIQUIS) tablet 2.5 mg  2.5 mg Oral BID Jozef Oquendo MD   2.5 mg at 08/25/24 0814    buPROPion (WELLBUTRIN SR) 12 hr tablet 100 mg  100 mg Oral Daily Jozef Oquendo MD   100 mg at 08/25/24 0813    cefTRIAXone (ROCEPHIN) 2 g vial to attach to  ml bag for ADULTS or NS 50 ml bag for PEDS  2 g Intravenous Q24H Tegan Nguyen MD        cyclobenzaprine (FLEXERIL) tablet 5 mg  5 mg Oral TID PRN Jozef Oquendo MD   5 mg at 08/25/24 0814    glucose gel 15-30 g  15-30 g Oral Q15 Min PRN Jozef Oquendo MD        Or    dextrose 50 % injection 25-50 mL  25-50 mL Intravenous Q15 Min PRN Jozef Oquendo MD        Or    glucagon injection 1 mg  1 mg Subcutaneous Q15 Min PRN Jozef Oquendo MD        DULoxetine (CYMBALTA) DR capsule 60 mg  60 mg Oral Daily Jozef Oquendo MD   60 mg at 08/25/24 0814    fluticasone-vilanterol (BREO ELLIPTA) 100-25 MCG/ACT inhaler 1 puff  1 puff Inhalation Daily Jozef Oquendo MD   1 puff at 08/25/24 0821    insulin aspart (NovoLOG) injection (RAPID ACTING)  1-7 Units Subcutaneous TID AC Jozef Oquendo MD   2 Units at 08/25/24 1202    insulin aspart (NovoLOG) injection (RAPID ACTING)  1-5 Units Subcutaneous At  Bedtime Jozef Oquendo MD        metoprolol tartrate (LOPRESSOR) tablet 75 mg  75 mg Oral BID Jozef Oquendo MD   75 mg at 08/25/24 0814    miconazole (MICATIN) 2 % powder   Topical BID Tegan Nguyen MD   Given at 08/25/24 0821    naloxone (NARCAN) injection 0.2 mg  0.2 mg Intravenous Q2 Min PRN Etten, Sha Wang PA-C        Or    naloxone (NARCAN) injection 0.4 mg  0.4 mg Intravenous Q2 Min PRN Etten, Sha Wang PA-C        Or    naloxone (NARCAN) injection 0.2 mg  0.2 mg Intramuscular Q2 Min PRN Etten, Sha Wang PA-C        Or    naloxone (NARCAN) injection 0.4 mg  0.4 mg Intramuscular Q2 Min PRN [place], Sha Wang PA-C        ondansetron (ZOFRAN ODT) ODT tab 4 mg  4 mg Oral Q6H PRN Jozef Oquendo MD        Or    ondansetron (ZOFRAN) injection 4 mg  4 mg Intravenous Q6H PRN Jozef Oquendo MD        oxyCODONE (ROXICODONE) tablet 5 mg  5 mg Oral Q4H PRN Tegan Nguyen MD   5 mg at 08/25/24 1455    oxyCODONE IR (ROXICODONE) half-tab 2.5 mg  2.5 mg Oral Q6H PRN Tegan Nguyen MD        pantoprazole (PROTONIX) EC tablet 20 mg  20 mg Oral Daily Jozef Oquendo MD   20 mg at 08/25/24 0814    Patient is already receiving anticoagulation with heparin, enoxaparin (LOVENOX), warfarin (COUMADIN)  or other anticoagulant medication   Does not apply Continuous PRN Jozef Oquendo MD        senna-docusate (SENOKOT-S/PERICOLACE) 8.6-50 MG per tablet 1 tablet  1 tablet Oral BID PRN Jozef Oquendo MD        Or    senna-docusate (SENOKOT-S/PERICOLACE) 8.6-50 MG per tablet 2 tablet  2 tablet Oral BID PRN Jozef Oquendo MD        torsemide (DEMADEX) tablet 20 mg  20 mg Oral BID Tegan Nguyen MD   20 mg at 08/25/24 0814             Review of Systems:   CV: NEGATIVE for chest pain  C: NEGATIVE for fever, chills, change in weight  E/M: NEGATIVE for ear, mouth and throat problems  R: NEGATIVE for significant cough or SOB          Physical Exam:   All vitals have  been reviewed  Patient Vitals for the past 24 hrs:   BP Temp Temp src Pulse Resp SpO2   08/25/24 1129 120/80 97.6  F (36.4  C) Oral 75 16 96 %   08/25/24 0733 (!) 173/85 98.2  F (36.8  C) Oral 75 18 93 %   08/25/24 0300 125/64 98.1  F (36.7  C) Oral 71 18 96 %   08/25/24 0054 -- -- -- -- 16 --   08/25/24 0009 -- -- -- -- 16 --   08/24/24 2330 138/65 98  F (36.7  C) Oral 70 18 92 %   08/24/24 1919 124/58 98.2  F (36.8  C) Oral 70 16 91 %       Intake/Output Summary (Last 24 hours) at 8/25/2024 1607  Last data filed at 8/25/2024 1400  Gross per 24 hour   Intake 400 ml   Output 1000 ml   Net -600 ml       Constitutional: Pleasant, alert, appropriate, following commands.  HEENT: Head atraumatic normocephalic. Pupils equal round and reactive.  Respiratory: Unlabored breathing no audible wheeze  Cardiovascular: Regular rate and rhythm per pulses.  Lymph/Hematologic: No lymphadenopathy in areas examined.  Musculoskeletal:   Well-healed surgical incisions of bilateral knees.  No open wounds, no ecchymosis.  She does have pain along the lateral hip and near the greater trochanteric bursa of the right hip.  Able to passively range the knee and the hip while patient is lying in bed.  This does aggravate her pain.  Patient is able to complete straight leg raise and hold.  Does not aggravate pain.  Able to externally rotate the right hip.  Due to body habitus unable to internally rotate  Minimal erythema of the surrounding skin.   Bilateral calves are soft, non-tender.  Bilateral lower extremity is NVI.  Sensation intact bilateral lower extremities  5/5 motor with resisted dorsi and plantar flexion bilaterally  +Dp pulse    Neurologic: normal without focal findings, mental status, speech normal, alert and oriented x iii, ERVIN, reflexes normal and symmetric  Neuropsychiatric: Stable          Data:   All laboratory data reviewed  Results for orders placed or performed during the hospital encounter of 08/23/24   Head CT w/o  contrast     Status: None    Narrative    EXAM: CT HEAD W/O CONTRAST  LOCATION: [facility]  DATE: 8/23/2024    INDICATION: Frequent falls with forehead contusion.  COMPARISON: CT 8/6/2024.  TECHNIQUE: Routine CT Head without IV contrast. Multiplanar reformats. Dose reduction techniques were used.    FINDINGS:  INTRACRANIAL CONTENTS: No intracranial hemorrhage, extraaxial collection, or mass effect.  No CT evidence of acute infarct. Small chronic lacunar infarct left basal ganglia. Mild to moderate presumed chronic small vessel ischemic changes. Mild to   moderate generalized volume loss. No hydrocephalus.     VISUALIZED ORBITS/SINUSES/MASTOIDS: No intraorbital abnormality. No paranasal sinus mucosal disease. No middle ear or mastoid effusion.    BONES/SOFT TISSUES: No acute abnormality.      Impression    IMPRESSION:  1.  No CT evidence for acute intracranial process.  2.  Brain atrophy and presumed chronic microvascular ischemic changes as above.   CT Cervical Spine w/o Contrast     Status: None    Narrative    EXAM: CT CERVICAL SPINE W/O CONTRAST  LOCATION: [facility]  DATE: 8/23/2024    INDICATION: Frequent fall, head injury.  COMPARISON: None.  TECHNIQUE: Routine CT Cervical Spine without IV contrast. Multiplanar reformats. Dose reduction techniques were used.    FINDINGS:  VERTEBRA: Normal vertebral body heights. Anterolisthesis C4-C5, reversal of lordosis, apex at C5-C6.. No fracture or posttraumatic subluxation.     CANAL/FORAMINA: At C3-4, severe left foraminal stenosis. At C4-5, moderate left foraminal stenosis. At C5-6, moderate right foraminal stenosis.     PARASPINAL: No extraspinal abnormality.      Impression    IMPRESSION:  1.  No fracture or posttraumatic subluxation.     CT Lumbar Spine w/o Contrast     Status: None    Narrative    EXAM: CT LUMBAR SPINE W/O CONTRAST  LOCATION: [facility]  DATE: 8/23/2024    INDICATION:  Fall, back pain.  COMPARISON: CT 11/26/2024.  TECHNIQUE: Routine CT Lumbar Spine without IV contrast. Multiplanar reformats. Dose reduction techniques were used.     FINDINGS:  VERTEBRA: Grade 1 anterolisthesis L3-L4 and L5-S1. Normal vertebral body heights. No fracture or posttraumatic subluxation. Osteopenic bones.    CANAL/FORAMINA: At L1-2, moderate central and severe bilateral foraminal stenosis. At L2-3, severe central stenosis, moderate to severe bilateral foraminal stenosis. At L3-4, severe central and severe bilateral foraminal stenosis. At L4-5, severe central   and severe right foraminal stenosis. At L5-S1, mild central and severe right foraminal stenosis.    PARASPINAL: Right renal pelvis stone measures 9 mm, no hydronephrosis, moderate adjacent chronic fat stranding. Large simple cyst right kidney. Moderate sacroiliac joint degenerative disease.      Impression    IMPRESSION:  1.  No fracture or posttraumatic subluxation.  2.  Multilevel high-grade spinal canal and foraminal stenosis.  3.  Right kidney stone, measures 9 mm. No hydronephrosis. Moderate surrounding chronic fat stranding unchanged from prior CT of 11/26/2023, may be secondary to chronic urinary infection.   CT Pelvis Bone wo Contrast     Status: None    Narrative    EXAM: CT PELVIS BONE WO CONTRAST  LOCATION: [facility]  DATE: 8/23/2024    INDICATION: Fall, trauma, leg pain  COMPARISON: None.  TECHNIQUE: CT scan of the pelvis was performed without IV contrast. Multiplanar reformats were obtained. Dose reduction techniques were used.  CONTRAST: None.    FINDINGS:    No acute fracture is identified. There is diffuse osseous demineralization.    There is moderate to advanced right hip degenerative arthrosis. Mild left hip degenerative arthrosis. There are also degenerative changes within the lower lumbar spine and at the sacroiliac joints and pubic symphysis.    There is a right hip joint effusion. Vascular  atherosclerotic calcifications are noted. There is a large ventral abdominal hernia containing multiple loops of nondilated bowel.    No free pelvic fluid. No drainable fluid collection.      Impression    IMPRESSION:  1.  No acute fracture is identified. Given the degree of osseous demineralization, MRI would be more sensitive for nondisplaced fracture and should be considered if there is persistent clinical concern.  2.  Moderate to advanced right hip degenerative arthrosis. There is right hip joint effusion.  3.  Additional chronic-appearing findings, as described.   Comprehensive metabolic panel     Status: Abnormal   Result Value Ref Range    Sodium 140 135 - 145 mmol/L    Potassium 4.3 3.4 - 5.3 mmol/L    Carbon Dioxide (CO2) 24 22 - 29 mmol/L    Anion Gap 17 (H) 7 - 15 mmol/L    Urea Nitrogen 51.3 (H) 8.0 - 23.0 mg/dL    Creatinine 2.08 (H) 0.51 - 0.95 mg/dL    GFR Estimate 22 (L) >60 mL/min/1.73m2    Calcium 10.4 8.8 - 10.4 mg/dL    Chloride 99 98 - 107 mmol/L    Glucose 191 (H) 70 - 99 mg/dL    Alkaline Phosphatase 80 40 - 150 U/L    AST 14 0 - 45 U/L    ALT 8 0 - 50 U/L    Protein Total 8.1 6.4 - 8.3 g/dL    Albumin 4.1 3.5 - 5.2 g/dL    Bilirubin Total 0.6 <=1.2 mg/dL   Magnesium     Status: Normal   Result Value Ref Range    Magnesium 2.2 1.7 - 2.3 mg/dL   UA with Microscopic reflex to Culture     Status: Abnormal    Specimen: Urine, Midstream   Result Value Ref Range    Color Urine Yellow Colorless, Straw, Light Yellow, Yellow    Appearance Urine Slightly Cloudy (A) Clear    Glucose Urine Negative Negative mg/dL    Bilirubin Urine Negative Negative    Ketones Urine Negative Negative mg/dL    Specific Gravity Urine 1.019 1.003 - 1.035    Blood Urine Large (A) Negative    pH Urine 5.0 5.0 - 7.0    Protein Albumin Urine 50 (A) Negative mg/dL    Urobilinogen Urine Normal Normal, 2.0 mg/dL    Nitrite Urine Negative Negative    Leukocyte Esterase Urine Small (A) Negative    Bacteria Urine Few (A) None Seen  /HPF    Amorphous Crystals Urine Few (A) None Seen /HPF    RBC Urine >182 (H) <=2 /HPF    WBC Urine 12 (H) <=5 /HPF    Squamous Epithelials Urine 2 (H) <=1 /HPF    Hyaline Casts Urine 4 (H) <=2 /LPF    Narrative    Urine Culture ordered based on laboratory criteria   CK total     Status: Abnormal   Result Value Ref Range     (H) 26 - 192 U/L   TSH with free T4 reflex     Status: Normal   Result Value Ref Range    TSH 2.01 0.30 - 4.20 uIU/mL   Asymptomatic COVID-19 Virus (Coronavirus) by PCR Nasopharyngeal     Status: Normal    Specimen: Nasopharyngeal; Swab   Result Value Ref Range    SARS CoV2 PCR Negative Negative    Narrative    Testing was performed using the [employer] Xpress SARS-CoV-2 Assay on the Cep[employer] Gene-Xpert Instrument Systems. Additional information about this assay can be found via the Test Directory. This [place] FDA cleared test should be ordered for the detection of SARS-CoV-2 in individuals with signs and symptoms of respiratory tract infection. This test is for in vitro diagnostic use under the [place] FDA for laboratories certified under CLIA to perform high complexity testing. A negative result does not rule out the presence of PCR inhibitors in the specimen or target RNA concentration below the limit of detection for the assay. The possibility of a false negative should be considered if the patient's recent exposure or clinical presentation suggests COVID-19. This test was validated by [facility]. These Laboratories are certified under the  Clinical Laboratory Improvement Amendments (CLIA) as qualified to perform high complexity testing.   CBC with platelets and differential     Status: Abnormal   Result Value Ref Range    WBC Count 11.0 4.0 - 11.0 10e3/uL    RBC Count 4.91 3.80 - 5.20 10e6/uL    Hemoglobin 12.9 11.7 - 15.7 g/dL    Hematocrit 41.3 35.0 - 47.0 %    MCV 84 78 - 100 fL    MCH 26.3 (L) 26.5 - 33.0 pg    MCHC 31.2 (L) 31.5 - 36.5 g/dL    RDW 16.1 (H) 10.0 - 15.0 %     Platelet Count 369 150 - 450 10e3/uL    % Neutrophils 79 %    % Lymphocytes 11 %    % Monocytes 9 %    % Eosinophils 1 %    % Basophils 0 %    % Immature Granulocytes 1 %    NRBCs per 100 WBC 0 <1 /100    Absolute Neutrophils 8.7 (H) 1.6 - 8.3 10e3/uL    Absolute Lymphocytes 1.2 0.8 - 5.3 10e3/uL    Absolute Monocytes 1.0 0.0 - 1.3 10e3/uL    Absolute Eosinophils 0.1 0.0 - 0.7 10e3/uL    Absolute Basophils 0.0 0.0 - 0.2 10e3/uL    Absolute Immature Granulocytes 0.1 <=0.4 10e3/uL    Absolute NRBCs 0.0 10e3/uL   Glucose by meter     Status: Abnormal   Result Value Ref Range    GLUCOSE BY METER POCT 159 (H) 70 - 99 mg/dL   Basic metabolic panel     Status: Abnormal   Result Value Ref Range    Sodium 140 135 - 145 mmol/L    Potassium 4.3 3.4 - 5.3 mmol/L    Chloride 104 98 - 107 mmol/L    Carbon Dioxide (CO2) 24 22 - 29 mmol/L    Anion Gap 12 7 - 15 mmol/L    Urea Nitrogen 47.4 (H) 8.0 - 23.0 mg/dL    Creatinine 1.92 (H) 0.51 - 0.95 mg/dL    GFR Estimate 24 (L) >60 mL/min/1.73m2    Calcium 9.7 8.8 - 10.4 mg/dL    Glucose 141 (H) 70 - 99 mg/dL   CBC with platelets     Status: Abnormal   Result Value Ref Range    WBC Count 8.3 4.0 - 11.0 10e3/uL    RBC Count 4.61 3.80 - 5.20 10e6/uL    Hemoglobin 11.9 11.7 - 15.7 g/dL    Hematocrit 39.8 35.0 - 47.0 %    MCV 86 78 - 100 fL    MCH 25.8 (L) 26.5 - 33.0 pg    MCHC 29.9 (L) 31.5 - 36.5 g/dL    RDW 16.3 (H) 10.0 - 15.0 %    Platelet Count 272 150 - 450 10e3/uL   CK total     Status: Normal   Result Value Ref Range     26 - 192 U/L   Glucose by meter     Status: Abnormal   Result Value Ref Range    GLUCOSE BY METER POCT 154 (H) 70 - 99 mg/dL   Glucose by meter     Status: Abnormal   Result Value Ref Range    GLUCOSE BY METER POCT 139 (H) 70 - 99 mg/dL   Procalcitonin     Status: Normal   Result Value Ref Range    Procalcitonin 0.11 <0.50 ng/mL   Glucose by meter     Status: Abnormal   Result Value Ref Range    GLUCOSE BY METER POCT 141 (H) 70 - 99 mg/dL   Glucose by meter      Status: Abnormal   Result Value Ref Range    GLUCOSE BY METER POCT 113 (H) 70 - 99 mg/dL   Glucose by meter     Status: Abnormal   Result Value Ref Range    GLUCOSE BY METER POCT 132 (H) 70 - 99 mg/dL   Basic metabolic panel     Status: Abnormal   Result Value Ref Range    Sodium 139 135 - 145 mmol/L    Potassium 4.5 3.4 - 5.3 mmol/L    Chloride 103 98 - 107 mmol/L    Carbon Dioxide (CO2) 17 (L) 22 - 29 mmol/L    Anion Gap 19 (H) 7 - 15 mmol/L    Urea Nitrogen 37.6 (H) 8.0 - 23.0 mg/dL    Creatinine 1.71 (H) 0.51 - 0.95 mg/dL    GFR Estimate 28 (L) >60 mL/min/1.73m2    Calcium 9.6 8.8 - 10.4 mg/dL    Glucose 157 (H) 70 - 99 mg/dL   Glucose by meter     Status: Abnormal   Result Value Ref Range    GLUCOSE BY METER POCT 133 (H) 70 - 99 mg/dL   Glucose by meter     Status: Abnormal   Result Value Ref Range    GLUCOSE BY METER POCT 154 (H) 70 - 99 mg/dL   Glucose by meter     Status: Abnormal   Result Value Ref Range    GLUCOSE BY METER POCT 215 (H) 70 - 99 mg/dL   EKG 12 lead     Status: None   Result Value Ref Range    Systolic Blood Pressure  mmHg    Diastolic Blood Pressure  mmHg    Ventricular Rate 71 BPM    Atrial Rate 64 BPM    NH Interval  ms    QRS Duration 154 ms     ms    QTc 482 ms    P Axis  degrees    R AXIS 75 degrees    T Axis 252 degrees    Interpretation ECG       Ventricular-paced rhythm  Abnormal ECG  When compared with ECG of 11-Jul-2024 16:18,  Electronic ventricular pacemaker has replaced Atrial fibrillation  Vent. rate has decreased by  43 bpm  Confirmed by GENERATED REPORT, COMPUTER ([id number]),  EDSON ORDAZ ([id number]) on 8/23/2024 4:23:28 PM     Urine Culture     Status: None    Specimen: Urine, Midstream   Result Value Ref Range    Culture 10,000-50,000 CFU/mL Mixture of Urogenital Roberta    CBC with platelets differential     Status: Abnormal    Narrative    The following orders were created for panel order CBC with platelets differential.  Procedure                                Abnormality         Status                     ---------                               -----------         ------                     CBC with platelets and d...[[id number]]  Abnormal            Final result                 Please view results for these tests on the individual orders.          Attestation:  Amount of time performed on this consult: 50 minutes.    Paris Galicia PA-C  [facility]

## 2024-08-26 ENCOUNTER — APPOINTMENT (OUTPATIENT)
Dept: PHYSICAL THERAPY | Facility: CLINIC | Age: 89
End: 2024-08-26
Payer: COMMERCIAL

## 2024-08-26 ENCOUNTER — DOCUMENTATION ONLY (OUTPATIENT)
Dept: CARDIOLOGY | Facility: CLINIC | Age: 89
End: 2024-08-26

## 2024-08-26 ENCOUNTER — PATIENT OUTREACH (OUTPATIENT)
Dept: CARE COORDINATION | Facility: CLINIC | Age: 89
End: 2024-08-26

## 2024-08-26 ENCOUNTER — APPOINTMENT (OUTPATIENT)
Dept: CARDIOLOGY | Facility: CLINIC | Age: 89
End: 2024-08-26
Payer: COMMERCIAL

## 2024-08-26 LAB
ANION GAP SERPL CALCULATED.3IONS-SCNC: 14 MMOL/L (ref 7–15)
BUN SERPL-MCNC: 36.1 MG/DL (ref 8–23)
CALCIUM SERPL-MCNC: 9.1 MG/DL (ref 8.8–10.4)
CHLORIDE SERPL-SCNC: 100 MMOL/L (ref 98–107)
CREAT SERPL-MCNC: 1.82 MG/DL (ref 0.51–0.95)
EGFRCR SERPLBLD CKD-EPI 2021: 26 ML/MIN/1.73M2
GLUCOSE BLDC GLUCOMTR-MCNC: 103 MG/DL (ref 70–99)
GLUCOSE BLDC GLUCOMTR-MCNC: 112 MG/DL (ref 70–99)
GLUCOSE BLDC GLUCOMTR-MCNC: 117 MG/DL (ref 70–99)
GLUCOSE BLDC GLUCOMTR-MCNC: 145 MG/DL (ref 70–99)
GLUCOSE BLDC GLUCOMTR-MCNC: 214 MG/DL (ref 70–99)
GLUCOSE SERPL-MCNC: 123 MG/DL (ref 70–99)
HCO3 SERPL-SCNC: 24 MMOL/L (ref 22–29)
POTASSIUM SERPL-SCNC: 4 MMOL/L (ref 3.4–5.3)
SODIUM SERPL-SCNC: 138 MMOL/L (ref 135–145)

## 2024-08-26 PROCEDURE — 99233 SBSQ HOSP IP/OBS HIGH 50: CPT

## 2024-08-26 PROCEDURE — G0378 HOSPITAL OBSERVATION PER HR: HCPCS

## 2024-08-26 PROCEDURE — 250N000013 HC RX MED GY IP 250 OP 250 PS 637: Performed by: INTERNAL MEDICINE

## 2024-08-26 PROCEDURE — 93288 INTERROG EVL PM/LDLS PM IP: CPT

## 2024-08-26 PROCEDURE — G0463 HOSPITAL OUTPT CLINIC VISIT: HCPCS

## 2024-08-26 PROCEDURE — 97530 THERAPEUTIC ACTIVITIES: CPT | Mod: GP

## 2024-08-26 PROCEDURE — 250N000013 HC RX MED GY IP 250 OP 250 PS 637: Performed by: HOSPITALIST

## 2024-08-26 PROCEDURE — 250N000013 HC RX MED GY IP 250 OP 250 PS 637

## 2024-08-26 PROCEDURE — 80048 BASIC METABOLIC PNL TOTAL CA: CPT | Performed by: INTERNAL MEDICINE

## 2024-08-26 PROCEDURE — 36415 COLL VENOUS BLD VENIPUNCTURE: CPT | Performed by: INTERNAL MEDICINE

## 2024-08-26 PROCEDURE — 82962 GLUCOSE BLOOD TEST: CPT

## 2024-08-26 RX ORDER — GUAIFENESIN/DEXTROMETHORPHAN 100-10MG/5
10 SYRUP ORAL EVERY 4 HOURS PRN
Status: DISCONTINUED | OUTPATIENT
Start: 2024-08-26 | End: 2024-08-28 | Stop reason: HOSPADM

## 2024-08-26 RX ORDER — LORAZEPAM 2 MG/ML
0.5 INJECTION INTRAMUSCULAR
Status: COMPLETED | OUTPATIENT
Start: 2024-08-26 | End: 2024-08-27

## 2024-08-26 RX ORDER — BENZONATATE 100 MG/1
100 CAPSULE ORAL 3 TIMES DAILY PRN
Status: DISCONTINUED | OUTPATIENT
Start: 2024-08-26 | End: 2024-08-26

## 2024-08-26 RX ADMIN — MICONAZOLE NITRATE: 2 POWDER TOPICAL at 20:32

## 2024-08-26 RX ADMIN — METHOCARBAMOL 750 MG: 500 TABLET ORAL at 20:27

## 2024-08-26 RX ADMIN — OXYCODONE HYDROCHLORIDE 5 MG: 5 TABLET ORAL at 10:34

## 2024-08-26 RX ADMIN — FLUTICASONE FUROATE AND VILANTEROL TRIFENATATE 1 PUFF: 100; 25 POWDER RESPIRATORY (INHALATION) at 09:13

## 2024-08-26 RX ADMIN — APIXABAN 2.5 MG: 2.5 TABLET, FILM COATED ORAL at 20:28

## 2024-08-26 RX ADMIN — METHOCARBAMOL 750 MG: 500 TABLET ORAL at 16:00

## 2024-08-26 RX ADMIN — APIXABAN 2.5 MG: 2.5 TABLET, FILM COATED ORAL at 09:13

## 2024-08-26 RX ADMIN — OXYCODONE HYDROCHLORIDE 5 MG: 5 TABLET ORAL at 02:24

## 2024-08-26 RX ADMIN — ACETAMINOPHEN 1000 MG: 500 TABLET, FILM COATED ORAL at 20:27

## 2024-08-26 RX ADMIN — ACETAMINOPHEN 1000 MG: 500 TABLET, FILM COATED ORAL at 14:16

## 2024-08-26 RX ADMIN — DULOXETINE HYDROCHLORIDE 60 MG: 60 CAPSULE, DELAYED RELEASE ORAL at 09:13

## 2024-08-26 RX ADMIN — TORSEMIDE 20 MG: 20 TABLET ORAL at 09:12

## 2024-08-26 RX ADMIN — BUPROPION HYDROCHLORIDE 100 MG: 100 TABLET, FILM COATED, EXTENDED RELEASE ORAL at 09:12

## 2024-08-26 RX ADMIN — METHOCARBAMOL 750 MG: 500 TABLET ORAL at 12:22

## 2024-08-26 RX ADMIN — METOPROLOL TARTRATE 75 MG: 50 TABLET, FILM COATED ORAL at 20:28

## 2024-08-26 RX ADMIN — ACETAMINOPHEN 1000 MG: 500 TABLET, FILM COATED ORAL at 09:12

## 2024-08-26 RX ADMIN — METOPROLOL TARTRATE 75 MG: 50 TABLET, FILM COATED ORAL at 09:12

## 2024-08-26 RX ADMIN — MICONAZOLE NITRATE: 2 POWDER TOPICAL at 09:18

## 2024-08-26 RX ADMIN — PANTOPRAZOLE SODIUM 20 MG: 20 TABLET, DELAYED RELEASE ORAL at 09:13

## 2024-08-26 RX ADMIN — GUAIFENESIN AND DEXTROMETHORPHAN 10 ML: 100; 10 SYRUP ORAL at 20:39

## 2024-08-26 ASSESSMENT — ACTIVITIES OF DAILY LIVING (ADL)
ADLS_ACUITY_SCORE: 49
ADLS_ACUITY_SCORE: 54
ADLS_ACUITY_SCORE: 49
ADLS_ACUITY_SCORE: 49
ADLS_ACUITY_SCORE: 54

## 2024-08-26 NOTE — PROGRESS NOTES
Received call from MRI requesting MRI Cardiology Clearance for patient.  Message routed to the device team to complete MRI Safety Clearance Form.    Inpatient MRI Phone number: [phone].    MIRZA Naik

## 2024-08-26 NOTE — PLAN OF CARE
Goal Outcome Evaluation:  PRIMARY Concern: Mechanical fall   SAFETY RISK Concerns (fall risk, behaviors, etc.): Fall risk    Isolation/Type: None   Tests/Procedures for NEXT shift: Lumbar MRI- check list and device check done - ativan available x1 for when MRI is done.   Consults? (Pending/following, signed-off?) SW following for TCU placement  Ortho following    Where is patient from? (Home, TCU, etc.): [facility]   Other Important info for NEXT shift: Ativan PRN x1 for MRI  Anticipated DC date & active delays: TBD      SUMMARY NOTE:     Orientation/Cognitive: A&O X4, Forgetful   Observation Goals (Met/ Not Met): Not met   Mobility Level/Assist Equipment: Ax2 GB /walker up to BSC   Antibiotics & Plan (IV/po, length of tx left): IV rocephin Q24   Pain Management:leah tyleno, robaxin, prn oxycodone 2.5  Tele/VS/O2: VSS on RA ex HTN at times  ABNL Lab/BG: BG=creat 1.82 , 214, 103  Diet: Low Saturated Fat Na <2400mg Diet, No Caffeine Diet  Bowel/Bladder: Incontinent, Purewick in place (Only at night)  Skin Concerns: redness to abdominal and breast fold. Scattered bruises   Drains/Devices: PIV SL, Purewick   Patient Stated Goal for Today: Rest

## 2024-08-26 NOTE — PROGRESS NOTES
Text sent to Dr. Ortega for sign off. Called MRI to let them know pt's device IS compatible, providing she has a rep check lead measurements prior.   Is the implanted device safe for MRI Exam? Yes, IF REP DOES CHECK BEFORE MRI AND LEADS ARE STABLE.  Is this device 3T compatible? Yes  Device Type: Pacemaker      Device Information: Medtronic, PPM Boyd (S)      Cardiology Orders for Device Programming     -- Yes -- The patient has a MRI conditional pulse generator and leads from the same     -- Yes -- The pulse generator and leads have been implanted for at least 6 weeks. (Does not apply to [employer] devices), HOWEVER HAS NOT HAD 6 WEEK CHECK DONE, WILL NEED LEAD CHECKED PRIOR TO MRI    -- Yes-- The device is implanted in the left pectoral region    -- no- There are not any additional active cardiac devices, abandoned leads, lead extenders or adapters    -- Yes -- Lead impedances are within the normal range per  guidelines.    -- Yes -- If the patient is pacemaker dependent the thresholds are less than or equal to 4.0 V @ 1.0 ms    -- Yes -- RA and RV leads are programmed to bipolar pacing operation or pacing off. If no, MRI TO CONTACT THE DEVICE REP FOR PROGRAMMING     Date of last In-clinic Device check: 7/19/24  Results of last Device check:  1.   Right atrium impedance: NA  2.   Right ventricle impedance: 741  3.   Left ventricle impedance: NA     4.   Right atrium threshold: NA  5.   Right ventricle threshold: 0.5 V @ 0.4 ms  6.   Left ventricle threshold: NA     Device programming during the scan guidelines   Pacing Mode (check one): Use the recommended pacing mode per Medtronic MRI Access Application  Pacing Rate: 70  bpm or   If remote reprogramming is applicable, please contact the Rep to assist

## 2024-08-26 NOTE — PROGRESS NOTES
Care Management Follow Up    Length of Stay (days): 0    Expected Discharge Date: 08/27/2024     Concerns to be Addressed:       Patient plan of care discussed at interdisciplinary rounds: Yes    Anticipated Discharge Disposition: Transitional Care     Anticipated Discharge Services: Transportation Services  Anticipated Discharge DME:      Patient/family educated on Medicare website which has current facility and service quality ratings:    Education Provided on the Discharge Plan:    Patient/Family in Agreement with the Plan: unable to assess    Referrals Placed by CM/SW:    Private pay costs discussed: transportation costs    Additional Information:  Patient has been accepted to [facility]. Anticipated discharge date is Tuesday. Spoke with daughter Sophia to update, she is in agreement, understands if other facilities accept we will let her know that too. Sophia stated a medical transport is needed, is agreeable to cost associated. Pt would like a private room at the TCU. Sophia asked for a way to get access to pt's past medical bills, provided with [place] billing office phone number.     Updated Danika at [facility] that pt would prefer a private room.    STEF Dominguez  Social Work  [facility]

## 2024-08-26 NOTE — PLAN OF CARE
Goal Outcome Evaluation:  PRIMARY Concern: Mechanical fall   SAFETY RISK Concerns (fall risk, behaviors, etc.): Fall risk    Isolation/Type: None   Tests/Procedures for NEXT shift: Lumbar MRI   Consults? (Pending/following, signed-off?) PT recommending TCU, SW sent out referrals. WOC consult pending. Ortho following    Where is patient from? (Home, TCU, etc.): [facility]   Other Important info for NEXT shift: Right hip MRI without contrast under sedation per patient request( Pt does have a pacemaker and will need the radiology team to look into this) see ortho note    Anticipated DC date & active delays: TBD      SUMMARY NOTE:     Orientation/Cognitive: A&O X4, Forgetful   Observation Goals (Met/ Not Met): Not met   Mobility Level/Assist Equipment: Ax2 GB /walker up to BSC   Antibiotics & Plan (IV/po, length of tx left): IV rocephin Q24   Pain Management: PRN oxycodone and flexeril   Tele/VS/O2: VSS on RA   ABNL Lab/BG: PX=[phone]  Diet: Low Saturated Fat Na <2400mg Diet, No Caffeine Diet  Bowel/Bladder: Incontinent, Purewick in place (Only at night)  Skin Concerns: redness to abdominal and breast fold. Scattered bruises   Drains/Devices: PIV SL, Purewick   Patient Stated Goal for Today: Rest          Observation goals  PRIOR TO DISCHARGE       Comments:  -diagnostic tests and consults completed and resulted- Not met   -vital signs normal or at patient baseline- Met   -safe disposition plan has been identified- Not met

## 2024-08-26 NOTE — PROGRESS NOTES
[facility]    Medicine Progress Note - Hospitalist Service    Date of Admission:  8/23/2024    Assessment & Plan   Moira Andre is a 89 year old female with PMH of diastolic CHF, CKD4, HTN, recurrent falls, atrial fibrillation s/p ablation and pacemaker, ASCVD, DM2, depression, GERD, asthma, who presents with a fall.     Mechanical fall  Right hip and leg discomfort  Mild rhabdomyolysis- Resolved  Recurrent falls  Patient has recent frequent falls--recently hospitalized 8/3 to 8/7 for a fall, UTI, then discharged to TCU, and then discharged home independently on 8/19. She then fell on 8/21 and now again 8/23, this time hitting her head and unable to get up. CT head no acute pathology, CT cerbvical lumbar spine no fracture, CT pelvis no clear fracture. Patient reports ongoing left leg spasms and has been taking muscle relaxants and oxycodone at home which increases her risk for a fall. Her falls are likely multifactorial. She may need a higher level of care like [facility] which patient states she is looking for currently.  -- On exam RLE is shortened and laterally rotated.  -- Patient has been admitted for further evaluation and management.  -- Physical therapy consulted, recommending TCU  -- Initial creatinine was 1.9 yesterday, patient was continued on IV fluids now improved to 1.7  -- Will hold Demadex until AM creatinine reviewed 8/27  --CK levels improved  --Flexeril discontinued  --Started scheduled Robaxin 750 mg QID 8/26.    --Robaxin can be given scheduled for 2 weeks, then transition to PRN QID  --Continue scheduled Tylenol and as needed Roxicodone.  --Patient's oxycodone was increased to 5 mg Q6h due to increased right hip pain on admission. 8/26 Patient is confused, and sleepy. Oxycodone decreased back to 2.5 mg q6h  --Patient is concerned about her hip discomfort, discussed x-ray and CT scan results  -- On clinical examination, patient might have significant osteoarthritis of the  hip which might be causing discomfort in the hip and inguinal area.  -- Care coordinators are following closely for safe disposition planning, patient will not be able to leave this weekend as awaiting insurance authorization  --Strict bedrest until MRI reviewed due to concern for hip fracture  -- Orthopedic surgery recommending obtaining MRI.    - MRI to be coordinated with Cardiology due to pacemaker.    - Cardiac device check ordered   - 0.5 mg IV ativan to be given prior to MRI for anxiety/claustrophobia   -- Patient has been on Eliquis, no hematoma was noticed on initial imaging      Mild acute metabolic encephalopathy versus underlying cognitive decline;  On 08/24, patient seemed more sleepy was not able to hold a conversation and was falling asleep also seemed more confused.  On 08/25, patient seems to be more alert and awake, seems to be close to her baseline, complaining of discomfort.  So far tolerating current dose of pain medications without somnolence but thinks that her pain is not controlled well as above.    --Discussed with bedside nursing for close monitoring.  --Patient will benefit from outpatient neuropsychiatric evaluation and Occupational Therapy evaluation.  --Discussed with patient regarding urine cultures not indicated of infection.  -- Delirium prevention measures including frequent reorientation, curtains open during the day, lights out at night, promote good sleep hygiene, cluster cares, use appropriate sensory and mobility aides, and encourage family presence  --As above,  consultation has been requested for safe disposition planning, have been in touch with daughter, appreciate their help    Bilateral abdominal folds candidiasis  On examination, patient has candidiasis under the breast fold area and in abdominal folds    -- WOC consulted; appreciate recommendations  -- Continue micafungin powder twice daily     Possible urinary tract infection;  Patient was recently  hospitalized for E. coli UTI.  CT lumbar spine does show 9 mm right kidney stone, no associated hydronephrosis, some fat stranding concerning for chronic urinary tract infection.  As above, due to the concern for mild delirium will treat possible UTI  Right 9 mm stone:    -- Ordered ceftriaxone 2 g every 24 hours on admission if UTI might be contributing to her delirium  -- Added procalcitonin to the admission labs which came back in normal range  -- Cultures showing mixed of urogenital tyler  -- Discussed with patient that she does not have UTI,  IV ceftriaxone stopped 8/25    Atrial fibrillation s/p ablation and pacemaker  Diastolic CHF  HTN  ASCVD  Does not appear volume oveloaded here.  -- PTA apixaban, metoprolol  -- Hold PTA statin  -- PTA torsemide was continued due to improvement in creatinine, however will hold again and reevaluate creatinine in AM 8/27     CKD4: Cr near baseline , seems to be between 1.8-2.0  --Patient received hydration in the beginning, now creatinine is down to 1.71 this morning. \\  --PTA torsemide restarted 8/25, held due to increase in creatinine again to 1.82.      DM2: Not on any PTA antidiabetics. Last A1c 7.2%.  here  - MDSSI     Depression: PTA bupropion  GERD: PTA PPI  Asthma: PTA inhaler       Observation Goals: -diagnostic tests and consults completed and resulted, -vital signs normal or at patient baseline, -safe disposition plan has been identified, Nurse to notify provider when observation goals have been met and patient is ready for discharge.  Diet: Combination Diet Low Saturated Fat Na <2400mg Diet, No Caffeine Diet    DVT Prophylaxis: DOAC  Charles Catheter: Not present  Lines: None     Cardiac Monitoring: None  Code Status: Full Code      Clinically Significant Risk Factors Present on Admission             # Anion Gap Metabolic Acidosis: Highest Anion Gap = 19 mmol/L in last 2 days, will monitor and treat as appropriate   # Drug Induced Coagulation Defect: home  "medication list includes an anticoagulant medication    # Hypertension: Noted on problem list  # Chronic heart failure with preserved ejection fraction: heart failure noted on problem list and last echo with EF >50%       # DMII: A1C = 7.2 % (Ref range: 0.0 - 5.6 %) within past 6 months    # Obesity: Estimated body mass index is 37.57 kg/m  as calculated from the following:    Height as of this encounter: 1.702 m (5' 7\").    Weight as of this encounter: 108.8 kg (239 lb 13.8 oz).       # Financial/Environmental Concerns:    # Asthma: noted on problem list  # Pacemaker present         Disposition Plan     Medically Ready for Discharge: Anticipated Tomorrow pending review of MRI and TCU placement.         The patient's care was discussed with the Attending Physician, Dr. Garcia, Bedside Nurse, and Patient.    Mohan Lewis NP  Hospitalist Service  [employer]  Securely message with [employer] (more info)  Text page via [employer] Paging/Directory   ______________________________________________________________________    Interval History   No acute events overnight.     Physical Exam   Vital Signs: Temp: 98.1  F (36.7  C) Temp src: Oral BP: 123/88 Pulse: 76   Resp: 18 SpO2: 90 % O2 Device: None (Room air)    Weight: 239 lbs 13.77 oz    EXAM:   General:  Appears stated age, no acute distress. Oriented to self and place.  Skin:  Warm, dry. No rashes or lesions on exposed skin.  HEENT:  Normocephalic. Ecchymosis on forehead, no open lacerations.   Neck:  Supple.  Chest:  Breath sounds CTA and no increased work of breathing on room air.  Cardiovascular:  RRR, no rub or murmur. +1 peripheral edema.  Abdomen:  Soft, non-tender, obese.  Musculoskeletal:  RLE laterally rotated and shortened. RLE swollen, not erythematous.   Neurological:  No new focal neurological deficits.   Psychiatric:  Affect and mood congruent.      Medical Decision Making       55 MINUTES SPENT BY ME on the date of service doing chart " review, history, exam, documentation & further activities per the note.      Data     I have personally reviewed the following data over the past 24 hrs:    N/A  \   N/A   / N/A     138 100 36.1 (H) /  112 (H)   4.0 24 1.82 (H) \       Imaging results reviewed over the past 24 hrs:   Recent Results (from the past 24 hour(s))   XR Femur Right 2 Views    Narrative    EXAM: XR FEMUR RIGHT 2 VIEWS  LOCATION: [facility]  DATE: 8/25/2024    INDICATION: knee pain  COMPARISON: None.      Impression    IMPRESSION: No hip fracture or dislocation. If there is persistent clinical concern for occult hip fracture, consider MRI which is more sensitive. Right knee arthroplasty with normal alignment. Dense vascular calcifications.

## 2024-08-26 NOTE — TELEPHONE ENCOUNTER
See care coordination encounter 8/26/24. Closing encounter.     Ap Bundy RN  [facility]

## 2024-08-26 NOTE — PROGRESS NOTES
Clinic Care Coordination Contact  Ambulatory Care Coordination to Inpatient Care Management   Hand-In Communication    Date:  August 26, 2024  Name: Moira Andre is enrolled in Ambulatory Care Coordination program and I am the Lead Care Coordinator.  CC Contact Information: Epic InBasket + phone  Payor Source: Payor: [facility] / Plan: [employer] MEDICARE ADVANTAGE / Product Type: HMO /   Current services in place:     Please see the CC Snaphot and Care Management Flowsheets for specific details of this Moira Andre care plan.   Additional details/specific concerns r/t this admission:    Discharge Disposition Pt has expressed interest in moving to an [facility], but she may not have adequate finances to do so.      I will follow this admission in Epic. Please feel free to contact me with questions or for further collaboration in discharge planning.    Maria L Mckeon,  [facility]  Clinic Care Coordinator  [facility]  [phone]  [email or web address]

## 2024-08-26 NOTE — CONSULTS
"SPIRITUAL HEALTH SERVICES - Consult Note  [facility] Short Stay    Assessment    Saw pt Kristyn Andre per consult request.    Patient/Family Understanding of Illness and Goals of Care -   Kristyn shared that she is in the hospital after having fallen several times and having a UTI. She shared that she has been in and out of the hospital a lot lately.  She expects to discharge to a TCU, but isn't sure when that will be.    Distress and Loss -   Kristyn shared that she is \"seeing stuff\" and \"hearing things.\" She believes that the medication and/or the UTI is causing her to hear \"Chinese opera singers\" and seeing \"a woman in a red sweater waving.\"  She shared that the things she is seeing/hearing are scary. I affirmed her emotions and encouraged her that she is safe here in the hospital.  She shared that she recognizes that she is unable to live safely on her own because of the amount of falls she has had this year, but is struggling to find a quality assisted living facility that she can afford.  She named the pain as something distressing. She shared that she was attempting to move a chair and retrieve something that fell in between the cushions when she first felt the pain in her leg and hip.     Strengths, Coping, and Resources -   Kristyn is well-supported by her son and daughter-in-law (Sophia). She shared that Sophia is helping as she navigates finding an assisted living facility.   She is also supported by many friends who call her regularly.    Meaning, Beliefs, and Spirituality -   Kristyn belongs to [facility] [employer]. She used to be an active participant but has not been able to attend [employer] recently.  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    Plan: [facility] will continue to follow and support Kristyn during her hospitalization. Please consult if needs arise.     Day Hodd (they/them), " Sarah  Spiritual Health Services  Chaplain Resident    [facility] routine referrals?*[phone]  [facility] available 24/7 for emergent requests/referrals, either by paging the on-call  or by entering an ASAP/STAT consult in Epic (this will also page the on-call ).

## 2024-08-26 NOTE — PROGRESS NOTES
Observation goals  PRIOR TO DISCHARGE       Comments:  -diagnostic tests and consults completed and resulted- Not met - needs MRI  -vital signs normal or at patient baseline- Met   -safe disposition plan has been identified- Not met

## 2024-08-26 NOTE — CONSULTS
"[facility] Nurse Inpatient Assessment     Consulted for: abdominal and breast area fold    Summary: Patient very tearful during WOC nurse visit. Reports ongoing right hip pain. Skin folds intact on assessment. Patient reports intermittent itching, especially to right breast and abdominal fold. Nursing staff has been using miconazole powder to area. RN reports skin condition has improved since admission. Provided education to patient regarding incontinence care and moisture management. Encouraged patient to get up to C.     Patient History (according to provider note(s):      \"Moira Andre is a 89 year old female with PMH of diastolic CHF, CKD4, HTN, recurrent falls, atrial fibrillation s/p ablation and pacemaker, ASCVD, DM2, depression, GERD, asthma, who presents with a fall.\"    Assessment:      Areas visualized during today's visit: Focused: and Skin folds     Skin Injury Location: Right abdominal fold        Last photo: 8/26  Skin injury due to: Fungal rash  and Intertrigo  Skin history and plan of care: Patient reports, \"I've had this for years.\" On assessment, skin is mostly intact. Erythema and minimal epidermal erosion identified to right abdominal fold. Powder in place.  Affected area:      Skin assessment: Erosion of epidermis and Erythema     Measurements (length x width x depth, in cm) No measurable wound      Color: pink     Temperature  normal      Drainage: none .      Color: none      Odor: none  Pain: mild, intermittent, tender, and stinging  Pain interventions prior to dressing change: patient tolerated well, slow and gentle cares , and distraction  Treatment goal: Heal , Infection control/prevention, Decrease moisture, and Protection  STATUS: initial assessment  Supplies ordered: gathered, at bedside, supplies stored on unit, discussed with RN, and discussed with patient       Treatment Plan:     Breast, abdominal, and groin fold care(s): BID and PRN  Cleanse " "folds with Chap[place] spray and soft cloths. Pat dry.  Dust miconazole powder over affected skin. Gently rub in powder and brush away excess with dry cloth.  Avoid brief in bed. Use a single incontinence pad under patient.   Do not use moist bath wipes for perineal/incontinence care. Follow incontinence protocol.     Orders: Written    RECOMMEND PRIMARY TEAM ORDER: None, at this time  Education provided: importance of repositioning, plan of care, wound progress, Infection prevention , Moisture management, and Hygiene  Discussed plan of care with: Patient and Nurse  [facility] nurse follow-up plan: signing off  Notify WO if wound(s) deteriorate.  Nursing to notify the Provider(s) and re-consult the WO Nurse if new skin concern.    DATA:     Current support surface: Standard  Standard Isoflex gel  Containment of urine/stool: Incontinence Protocol, Brief, and Suction based external urinary catheter   BMI: Body mass index is 37.57 kg/m .   Active diet order: Orders Placed This Encounter      Combination Diet Low Saturated Fat Na <2400mg Diet, No Caffeine Diet     Output: I/O last 3 completed shifts:  In: 400 [P.O.:400]  Out: 1200 [Urine:1200]     Labs:   Recent Labs   Lab 08/24/24  0703 08/23/24  1629   ALBUMIN  --  4.1   HGB 11.9 12.9   WBC 8.3 11.0     Pressure injury risk assessment:   Sensory Perception: 3-->slightly limited  Moisture: 3-->occasionally moist  Activity: 3-->walks occasionally  Mobility: 3-->slightly limited  Nutrition: 3-->adequate  Friction and Shear: 2-->potential problem  Edwardo Score: 17    Bebe Campos RN, CWOCN  Please contact via [employer] at \"[facility] nurse\"- M-F 8A-4P  Leave  @ [phone] for non-urgent needs. Checked occasionally M-F.    "

## 2024-08-27 ENCOUNTER — TELEPHONE (OUTPATIENT)
Dept: CARDIOLOGY | Facility: CLINIC | Age: 89
End: 2024-08-27

## 2024-08-27 ENCOUNTER — APPOINTMENT (OUTPATIENT)
Dept: MRI IMAGING | Facility: CLINIC | Age: 89
End: 2024-08-27
Attending: STUDENT IN AN ORGANIZED HEALTH CARE EDUCATION/TRAINING PROGRAM
Payer: COMMERCIAL

## 2024-08-27 DIAGNOSIS — T81.41XA SUPERFICIAL INCISIONAL INFECTION OF SURGICAL SITE: ICD-10-CM

## 2024-08-27 DIAGNOSIS — Z98.890 HX OF ATRIOVENTRICULAR NODE ABLATION: ICD-10-CM

## 2024-08-27 DIAGNOSIS — Z95.0 CARDIAC PACEMAKER IN SITU: Primary | ICD-10-CM

## 2024-08-27 LAB
ANION GAP SERPL CALCULATED.3IONS-SCNC: 16 MMOL/L (ref 7–15)
BUN SERPL-MCNC: 33.2 MG/DL (ref 8–23)
CALCIUM SERPL-MCNC: 9.1 MG/DL (ref 8.8–10.4)
CHLORIDE SERPL-SCNC: 100 MMOL/L (ref 98–107)
CREAT SERPL-MCNC: 1.68 MG/DL (ref 0.51–0.95)
EGFRCR SERPLBLD CKD-EPI 2021: 29 ML/MIN/1.73M2
ERYTHROCYTE [DISTWIDTH] IN BLOOD BY AUTOMATED COUNT: 15.6 % (ref 10–15)
GLUCOSE BLDC GLUCOMTR-MCNC: 121 MG/DL (ref 70–99)
GLUCOSE BLDC GLUCOMTR-MCNC: 124 MG/DL (ref 70–99)
GLUCOSE BLDC GLUCOMTR-MCNC: 145 MG/DL (ref 70–99)
GLUCOSE BLDC GLUCOMTR-MCNC: 146 MG/DL (ref 70–99)
GLUCOSE BLDC GLUCOMTR-MCNC: 169 MG/DL (ref 70–99)
GLUCOSE SERPL-MCNC: 142 MG/DL (ref 70–99)
HCO3 SERPL-SCNC: 23 MMOL/L (ref 22–29)
HCT VFR BLD AUTO: 40 % (ref 35–47)
HGB BLD-MCNC: 12.4 G/DL (ref 11.7–15.7)
MCH RBC QN AUTO: 26.3 PG (ref 26.5–33)
MCHC RBC AUTO-ENTMCNC: 31 G/DL (ref 31.5–36.5)
MCV RBC AUTO: 85 FL (ref 78–100)
PLATELET # BLD AUTO: 345 10E3/UL (ref 150–450)
POTASSIUM SERPL-SCNC: 4.4 MMOL/L (ref 3.4–5.3)
RBC # BLD AUTO: 4.72 10E6/UL (ref 3.8–5.2)
SODIUM SERPL-SCNC: 139 MMOL/L (ref 135–145)
WBC # BLD AUTO: 10.7 10E3/UL (ref 4–11)

## 2024-08-27 PROCEDURE — 250N000013 HC RX MED GY IP 250 OP 250 PS 637: Performed by: INTERNAL MEDICINE

## 2024-08-27 PROCEDURE — G0378 HOSPITAL OBSERVATION PER HR: HCPCS

## 2024-08-27 PROCEDURE — 250N000011 HC RX IP 250 OP 636

## 2024-08-27 PROCEDURE — 80048 BASIC METABOLIC PNL TOTAL CA: CPT

## 2024-08-27 PROCEDURE — 85027 COMPLETE CBC AUTOMATED: CPT

## 2024-08-27 PROCEDURE — 250N000013 HC RX MED GY IP 250 OP 250 PS 637

## 2024-08-27 PROCEDURE — 96375 TX/PRO/DX INJ NEW DRUG ADDON: CPT

## 2024-08-27 PROCEDURE — 36415 COLL VENOUS BLD VENIPUNCTURE: CPT

## 2024-08-27 PROCEDURE — 99233 SBSQ HOSP IP/OBS HIGH 50: CPT

## 2024-08-27 PROCEDURE — 73721 MRI JNT OF LWR EXTRE W/O DYE: CPT | Mod: 26 | Performed by: RADIOLOGY

## 2024-08-27 PROCEDURE — 73721 MRI JNT OF LWR EXTRE W/O DYE: CPT | Mod: 52,RT

## 2024-08-27 PROCEDURE — 82962 GLUCOSE BLOOD TEST: CPT

## 2024-08-27 RX ORDER — HYDROMORPHONE HCL IN WATER/PF 6 MG/30 ML
0.4 PATIENT CONTROLLED ANALGESIA SYRINGE INTRAVENOUS ONCE
Status: COMPLETED | OUTPATIENT
Start: 2024-08-27 | End: 2024-08-27

## 2024-08-27 RX ADMIN — METOPROLOL TARTRATE 75 MG: 50 TABLET, FILM COATED ORAL at 08:05

## 2024-08-27 RX ADMIN — OXYCODONE HYDROCHLORIDE 2.5 MG: 5 TABLET ORAL at 09:32

## 2024-08-27 RX ADMIN — METHOCARBAMOL 750 MG: 500 TABLET ORAL at 19:42

## 2024-08-27 RX ADMIN — ACETAMINOPHEN 1000 MG: 500 TABLET, FILM COATED ORAL at 13:32

## 2024-08-27 RX ADMIN — OXYCODONE HYDROCHLORIDE 2.5 MG: 5 TABLET ORAL at 00:45

## 2024-08-27 RX ADMIN — APIXABAN 2.5 MG: 2.5 TABLET, FILM COATED ORAL at 08:05

## 2024-08-27 RX ADMIN — TORSEMIDE 20 MG: 20 TABLET ORAL at 16:41

## 2024-08-27 RX ADMIN — MICONAZOLE NITRATE: 2 POWDER TOPICAL at 08:07

## 2024-08-27 RX ADMIN — DULOXETINE HYDROCHLORIDE 60 MG: 60 CAPSULE, DELAYED RELEASE ORAL at 08:05

## 2024-08-27 RX ADMIN — BUPROPION HYDROCHLORIDE 100 MG: 100 TABLET, FILM COATED, EXTENDED RELEASE ORAL at 08:05

## 2024-08-27 RX ADMIN — METHOCARBAMOL 750 MG: 500 TABLET ORAL at 16:41

## 2024-08-27 RX ADMIN — METHOCARBAMOL 750 MG: 500 TABLET ORAL at 08:05

## 2024-08-27 RX ADMIN — ACETAMINOPHEN 1000 MG: 500 TABLET, FILM COATED ORAL at 08:05

## 2024-08-27 RX ADMIN — METHOCARBAMOL 750 MG: 500 TABLET ORAL at 13:32

## 2024-08-27 RX ADMIN — ACETAMINOPHEN 1000 MG: 500 TABLET, FILM COATED ORAL at 19:42

## 2024-08-27 RX ADMIN — LORAZEPAM 0.5 MG: 2 INJECTION INTRAMUSCULAR; INTRAVENOUS at 13:32

## 2024-08-27 RX ADMIN — MICONAZOLE NITRATE: 2 POWDER TOPICAL at 19:46

## 2024-08-27 RX ADMIN — APIXABAN 2.5 MG: 2.5 TABLET, FILM COATED ORAL at 19:42

## 2024-08-27 RX ADMIN — FLUTICASONE FUROATE AND VILANTEROL TRIFENATATE 1 PUFF: 100; 25 POWDER RESPIRATORY (INHALATION) at 08:07

## 2024-08-27 RX ADMIN — PANTOPRAZOLE SODIUM 20 MG: 20 TABLET, DELAYED RELEASE ORAL at 08:05

## 2024-08-27 RX ADMIN — METOPROLOL TARTRATE 75 MG: 50 TABLET, FILM COATED ORAL at 19:42

## 2024-08-27 RX ADMIN — HYDROMORPHONE HYDROCHLORIDE 0.4 MG: 0.2 INJECTION, SOLUTION INTRAMUSCULAR; INTRAVENOUS; SUBCUTANEOUS at 14:14

## 2024-08-27 ASSESSMENT — ACTIVITIES OF DAILY LIVING (ADL)
ADLS_ACUITY_SCORE: 51
ADLS_ACUITY_SCORE: 51
ADLS_ACUITY_SCORE: 52
ADLS_ACUITY_SCORE: 51
ADLS_ACUITY_SCORE: 52
ADLS_ACUITY_SCORE: 51
ADLS_ACUITY_SCORE: 49
ADLS_ACUITY_SCORE: 52
ADLS_ACUITY_SCORE: 51
ADLS_ACUITY_SCORE: 52
ADLS_ACUITY_SCORE: 51
ADLS_ACUITY_SCORE: 52

## 2024-08-27 NOTE — PROGRESS NOTES
Device transmission from 8/26/24:     Leads stable, OK to proceed with MRI. Updated MRI department.

## 2024-08-27 NOTE — PLAN OF CARE
Goal Outcome Evaluation:      Plan of Care Reviewed With: patient    Overall Patient Progress: no changeOverall Patient Progress: no change      PRIMARY Concern: Fall  SAFETY RISK Concerns (fall risk, behaviors, etc.): Fall risk       Isolation/Type: NA  Tests/Procedures for NEXT shift: MRI (will need an ACLS RN to go w/ pt  Consults? (Pending/following, signed-off?) SW following for placement  Where is patient from? (Home, TCU, etc.): Home  Other Important info for NEXT shift: needs MRI will need PRN ativan   Anticipated DC date & active delays: TBD  _____________________________________________________________________________  SUMMARY NOTE:  Orientation/Cognitive: A&Ox4 forgetful  Observation Goals (Met/ Not Met): Not met  Mobility Level/Assist Equipment: Ax2,GB, walker  Antibiotics & Plan (IV/po, length of tx left): NA  Pain Management: ack pain PRN oxy given along w/ scheduled robaxin  Tele/VS/O2: VSS on RA  ABNL Lab/BG: Creat:1.82 B  Diet: cardiac no caffeine  Bowel/Bladder: incontinent of urine, continent of bowel  Skin Concerns: redness/moist area to abd fold and breast folds, scape to forehead, and scattered bruising   Drains/Devices: PIV:S/L purewick  Patient Stated Goal for Today: To sleep  Observation goals  PRIOR TO DISCHARGE        Comments:   -diagnostic tests and consults completed and resulted:Not met, needs MRI  -vital signs normal or at patient baseline:Not met  -safe disposition plan has been identified:Not met  Nurse to notify provider when observation goals have been met and patient is ready for discharge.

## 2024-08-27 NOTE — TELEPHONE ENCOUNTER
Patient was scheduled to have her 6.5 week check s/p PPM placement and AVNA today. Upon review, patient is currently inpatient for work up regarding frequent falls.  Sent a message to our [employer] reps to see if anyone would be able to complete patient's check while she is inpatient. If not, will need to reschedule.

## 2024-08-27 NOTE — PROGRESS NOTES
Observation goals  PRIOR TO DISCHARGE        Comments:   -diagnostic tests and consults completed and resulted:Not met, needs MRI  -vital signs normal or at patient baseline:Not met  -safe disposition plan has been identified:Not met  Nurse to notify provider when observation goals have been met and patient is ready for discharge.

## 2024-08-27 NOTE — PROGRESS NOTES
Care Management Follow Up    Length of Stay (days): 0    Expected Discharge Date: 08/28/2024     Concerns to be Addressed:       Patient plan of care discussed at interdisciplinary rounds: Yes    Anticipated Discharge Disposition: Transitional Care     Anticipated Discharge Services: Transportation Services  Anticipated Discharge DME:      Patient/family educated on Medicare website which has current facility and service quality ratings:    Education Provided on the Discharge Plan:    Patient/Family in Agreement with the Plan: unable to assess    Referrals Placed by CM/SW:    Private pay costs discussed: Not applicable    Additional Information:  Writer spoke with care team during rounds. Patient will need an MRI today. Patient may not be able to get MRI due to power outages. Writer notified [facility] that the patient would not be discharging today.     Maxim Gaines MSW, TLGSW  [facility]  Care [facility]

## 2024-08-27 NOTE — PROGRESS NOTES
[facility]    Medicine Progress Note - Hospitalist Service    Date of Admission:  8/23/2024    Assessment & Plan   Moira Andre is a 89 year old female with PMH of diastolic CHF, CKD4, HTN, recurrent falls, atrial fibrillation s/p ablation and pacemaker, ASCVD, DM2, depression, GERD, asthma, who presents with a fall.     Mechanical fall  Right hip and leg discomfort  Mild rhabdomyolysis- Resolved  Recurrent falls  Patient has recent frequent falls--recently hospitalized 8/3 to 8/7 for a fall, UTI, then discharged to TCU, and then discharged home independently on 8/19. She then fell on 8/21 and now again 8/23, this time hitting her head and unable to get up. CT head no acute pathology, CT cerbvical lumbar spine no fracture, CT pelvis no clear fracture. Patient reports ongoing left leg spasms and has been taking muscle relaxants and oxycodone at home which increases her risk for a fall. Her falls are likely multifactorial. She may need a higher level of care like [facility] which patient states she is looking for currently.  -- On exam RLE is shortened and laterally rotated.  -- Patient has been admitted for further evaluation and management.  -- Physical therapy consulted, recommending TCU  -- Initial creatinine was 1.9 yesterday, patient was continued on IV fluids now improved to 1.7  -- Will hold Demadex until AM creatinine reviewed 8/27  --CK levels improved  --Flexeril discontinued  --Started scheduled Robaxin 750 mg QID 8/26.    --Robaxin can be given scheduled for 2 weeks, then transition to PRN QID  --Continue scheduled Tylenol and as needed Roxicodone.  --Patient's oxycodone was increased to 5 mg Q6h due to increased right hip pain on admission. 8/26 Patient is confused, and sleepy. Oxycodone decreased back to 2.5 mg q6h  --Patient is concerned about her hip discomfort, discussed x-ray and CT scan results  -- On clinical examination, patient might have significant osteoarthritis of the  hip which might be causing discomfort in the hip and inguinal area.  -- Care coordinators are following closely for safe disposition planning, patient will not be able to leave this weekend as awaiting insurance authorization  --Strict bedrest until MRI reviewed due to concern for hip fracture  -- Orthopedic surgery recommending obtaining MRI.    - MRI to be coordinated with Cardiology due to pacemaker.    - Cardiac device check ordered   - 0.5 mg IV ativan to be given prior to MRI for anxiety/claustrophobia   -- Patient has been on Eliquis, no hematoma was noticed on initial imaging; on exam no increased ecchymosis or swelling      Mild acute metabolic encephalopathy versus underlying cognitive decline;  On 08/24, patient seemed more sleepy was not able to hold a conversation and was falling asleep also seemed more confused.  On 08/25, patient seems to be more alert and awake, seems to be close to her baseline, complaining of discomfort.  So far tolerating current dose of pain medications without somnolence but thinks that her pain is not controlled well as above.  --Discussed with bedside nursing for close monitoring.  --Patient will benefit from outpatient neuropsychiatric evaluation and Occupational Therapy evaluation.  --Discussed with patient regarding urine cultures not indicated of infection.  --Delirium prevention measures including frequent reorientation, curtains open during the day, lights out at night, promote good sleep hygiene, cluster cares, use appropriate sensory and mobility aides, and encourage family presence  --As above,  consultation has been requested for safe disposition planning, have been in touch with daughter, appreciate their help    Bilateral abdominal folds candidiasis  On examination, patient has candidiasis under the breast fold area and in abdominal folds  -- WOC consulted; appreciate recommendations  -- Continue micafungin powder twice daily     Possible urinary tract  infection;  Patient was recently hospitalized for E. coli UTI.  CT lumbar spine does show 9 mm right kidney stone, no associated hydronephrosis, some fat stranding concerning for chronic urinary tract infection.  As above, due to the concern for mild delirium will treat possible UTI  Right 9 mm stone:  -- Ordered ceftriaxone 2 g every 24 hours on admission if UTI might be contributing to her delirium  -- Added procalcitonin to the admission labs which came back in normal range  -- Cultures showing mixed of urogenital tyler  -- Discussed with patient that she does not have UTI,  IV ceftriaxone stopped 8/25    Atrial fibrillation s/p ablation and pacemaker  Diastolic CHF  HTN  ASCVD  Does not appear volume oveloaded here.  -- PTA apixaban, metoprolol  -- Hold PTA statin  -- PTA torsemide has been intermittently held due to concern for TIFFANI. Resumed 8/27 as creatinine improving. Re-evaluate pending review of creatinine 8/27.     CKD4: Cr near baseline , seems to be between 1.8-2.0  -- Patient received hydration in the beginning, now creatinine is down to 1.71 this morning. \\  -- PTA torsemide restarted 8/25, held due to increase in creatinine again to 1.82. Given improvement in creatinine 8/27, torsemide resumed.     DM2: Not on any PTA antidiabetics. Last A1c 7.2%.  here  - MDSSI  - Glucose checks AC and HS  - Hypoglycemia protocol     Depression: PTA bupropion  GERD: PTA PPI  Asthma: PTA inhaler       Observation Goals: -diagnostic tests and consults completed and resulted, -vital signs normal or at patient baseline, -safe disposition plan has been identified, Nurse to notify provider when observation goals have been met and patient is ready for discharge.  Diet: Combination Diet Low Saturated Fat Na <2400mg Diet, No Caffeine Diet    DVT Prophylaxis: DOAC  Charles Catheter: Not present  Lines: None     Cardiac Monitoring: None  Code Status: Full Code      Clinically Significant Risk Factors Present on Admission  "              # Drug Induced Coagulation Defect: home medication list includes an anticoagulant medication    # Hypertension: Noted on problem list  # Chronic heart failure with preserved ejection fraction: heart failure noted on problem list and last echo with EF >50%       # DMII: A1C = 7.2 % (Ref range: 0.0 - 5.6 %) within past 6 months    # Obesity: Estimated body mass index is 37.57 kg/m  as calculated from the following:    Height as of this encounter: 1.702 m (5' 7\").    Weight as of this encounter: 108.8 kg (239 lb 13.8 oz).       # Financial/Environmental Concerns:    # Asthma: noted on problem list  # Pacemaker present             Disposition Plan     Medically Ready for Discharge: Anticipated Tomorrow pending review of MRI results.       The patient's care was discussed with the Attending Physician, Dr. Garcia, Bedside Nurse, and Patient.    Mohan Lewis NP  Hospitalist Service  [facility]  Securely message with [employer] (more info)  Text page via [employer] Paging/Directory   ______________________________________________________________________    Interval History   No acute events overnight. Patient reports her pain is poorly controlled this morning. She is tearful and anxious. Emotional support provided. She reports increased pain in her right hip. Her exam is similar to yesterday. No evidence of hematoma in RLE.     Physical Exam   Vital Signs: Temp: 97.8  F (36.6  C) Temp src: Oral BP: 124/69 Pulse: 71   Resp: 20 SpO2: 92 % O2 Device: None (Room air)    Weight: 239 lbs 13.77 oz  General:  Appears stated age, no acute distress. A&O x 3, forgetful at times.   Skin:  Warm, dry. No rashes or lesions on exposed skin.  HEENT:  Normocephalic. Ecchymosis on left frontal aspect of head.   Neck:  Supple.  Chest:  Breath sounds CTA and no increased work of breathing on room air.  Cardiovascular:  RRR, no rub or murmur. No peripheral edema.  Abdomen:  Soft, non-tender, " obese.  Musculoskeletal:  RLE shortened and laterally rotated. Pain with flexion, extension, and rotation.  Neurological:  No focal neurological deficits.   Psychiatric:  Anxious.      Medical Decision Making       50 MINUTES SPENT BY ME on the date of service doing chart review, history, exam, documentation & further activities per the note.      Data     I have personally reviewed the following data over the past 24 hrs:    10.7  \   12.4   / 345     139 100 33.2 (H) /  169 (H)   4.4 23 1.68 (H) \       Imaging results reviewed over the past 24 hrs:   No results found for this or any previous visit (from the past 24 hour(s)).

## 2024-08-27 NOTE — PROGRESS NOTES
PRIMARY Concern: Fall  SAFETY RISK Concerns (fall risk, behaviors, etc.): Fall risk       Isolation/Type: NA  Tests/Procedures for NEXT shift: MRI results.   Consults? (Pending/following, signed-off?) SW following for placement  Where is patient from? (Home, TCU, etc.): Home  Other Important info for NEXT shift: Hx: PPM, DM2, Asthma.   Anticipated DC date & active delays: TBD, pending MRI results. PT rec TCU.   _____________________________________________  SUMMARY NOTE:  Orientation/Cognitive: A&Ox4 forgetful  Observation Goals (Met/ Not Met): Not met  Mobility Level/Assist Equipment: Bedrest till MRI results are back.   Antibiotics & Plan (IV/po, length of tx left): NA  Pain Management: PRN oxy given along w/ scheduled robaxin and Tylenol.   Tele/VS/O2: VSS on RA  ABNL Lab/BG: Creat:1.68, BUN 33.2,   Diet: cardiac no caffeine  Bowel/Bladder: incontinent of urine, continent of bowel  Skin Concerns: redness/moist area to abd fold and breast folds, abrasion to forehead, and scattered bruising   Drains/Devices: PIV:S/L Blade  Patient Stated Goal for Today: Rest

## 2024-08-27 NOTE — PROGRESS NOTES
Goal Outcome Evaluation:  Observation goals  PRIOR TO DISCHARGE       Comments:   -diagnostic tests and consults completed and resulted:Not met, needs MRI  -vital signs normal or at patient baseline:Met  -safe disposition plan has been identified:Not met  Nurse to notify provider when observation goals have been met and patient is ready for discharge.    Plan of Care Reviewed With: patient

## 2024-08-27 NOTE — PLAN OF CARE
Goal Outcome Evaluation:  Observation goals  PRIOR TO DISCHARGE       Comments:   -diagnostic tests and consults completed and resulted:Not met, needs MRI  -vital signs normal or at patient baseline:Partially met, slightly elevated BP  -safe disposition plan has been identified:Not met  Nurse to notify provider when observation goals have been met and patient is ready for discharge.    Plan of Care Reviewed With: patient

## 2024-08-28 ENCOUNTER — ANCILLARY PROCEDURE (OUTPATIENT)
Dept: CARDIOLOGY | Facility: CLINIC | Age: 89
End: 2024-08-28
Attending: INTERNAL MEDICINE
Payer: COMMERCIAL

## 2024-08-28 ENCOUNTER — DOCUMENTATION ONLY (OUTPATIENT)
Dept: GERIATRICS | Facility: CLINIC | Age: 89
End: 2024-08-28
Payer: COMMERCIAL

## 2024-08-28 ENCOUNTER — LAB REQUISITION (OUTPATIENT)
Dept: LAB | Facility: CLINIC | Age: 89
End: 2024-08-28
Payer: COMMERCIAL

## 2024-08-28 VITALS
DIASTOLIC BLOOD PRESSURE: 67 MMHG | TEMPERATURE: 97.9 F | HEIGHT: 67 IN | HEART RATE: 74 BPM | RESPIRATION RATE: 20 BRPM | BODY MASS INDEX: 37.65 KG/M2 | WEIGHT: 239.86 LBS | OXYGEN SATURATION: 93 % | SYSTOLIC BLOOD PRESSURE: 131 MMHG

## 2024-08-28 DIAGNOSIS — Z11.1 ENCOUNTER FOR SCREENING FOR RESPIRATORY TUBERCULOSIS: ICD-10-CM

## 2024-08-28 DIAGNOSIS — Z95.0 CARDIAC PACEMAKER IN SITU: ICD-10-CM

## 2024-08-28 LAB
GLUCOSE BLDC GLUCOMTR-MCNC: 110 MG/DL (ref 70–99)
GLUCOSE BLDC GLUCOMTR-MCNC: 130 MG/DL (ref 70–99)
GLUCOSE BLDC GLUCOMTR-MCNC: 238 MG/DL (ref 70–99)

## 2024-08-28 PROCEDURE — 250N000013 HC RX MED GY IP 250 OP 250 PS 637

## 2024-08-28 PROCEDURE — 82962 GLUCOSE BLOOD TEST: CPT

## 2024-08-28 PROCEDURE — 250N000013 HC RX MED GY IP 250 OP 250 PS 637: Performed by: INTERNAL MEDICINE

## 2024-08-28 PROCEDURE — G0378 HOSPITAL OBSERVATION PER HR: HCPCS

## 2024-08-28 PROCEDURE — 99239 HOSP IP/OBS DSCHRG MGMT >30: CPT | Performed by: INTERNAL MEDICINE

## 2024-08-28 RX ORDER — TRAMADOL HYDROCHLORIDE 50 MG/1
50 TABLET ORAL 3 TIMES DAILY
Status: DISCONTINUED | OUTPATIENT
Start: 2024-08-28 | End: 2024-08-28 | Stop reason: HOSPADM

## 2024-08-28 RX ORDER — ACETAMINOPHEN 500 MG
1000 TABLET ORAL 3 TIMES DAILY
Qty: 100 TABLET | Refills: 0 | DISCHARGE
Start: 2024-08-28

## 2024-08-28 RX ORDER — TRAMADOL HYDROCHLORIDE 50 MG/1
50 TABLET ORAL EVERY 6 HOURS PRN
Qty: 10 TABLET | Status: SHIPPED | DISCHARGE
Start: 2024-08-28 | End: 2024-08-29

## 2024-08-28 RX ORDER — HYDROXYZINE HYDROCHLORIDE 25 MG/1
50 TABLET, FILM COATED ORAL EVERY 4 HOURS PRN
Status: DISCONTINUED | OUTPATIENT
Start: 2024-08-28 | End: 2024-08-28 | Stop reason: HOSPADM

## 2024-08-28 RX ORDER — AMOXICILLIN 250 MG
2 CAPSULE ORAL 2 TIMES DAILY PRN
Qty: 30 TABLET | Refills: 0 | DISCHARGE
Start: 2024-08-28 | End: 2024-08-29

## 2024-08-28 RX ORDER — METHOCARBAMOL 750 MG/1
750 TABLET, FILM COATED ORAL 4 TIMES DAILY
Qty: 90 TABLET | Refills: 0 | DISCHARGE
Start: 2024-08-28 | End: 2024-09-10

## 2024-08-28 RX ORDER — HYDROXYZINE HYDROCHLORIDE 25 MG/1
25 TABLET, FILM COATED ORAL EVERY 4 HOURS PRN
Status: DISCONTINUED | OUTPATIENT
Start: 2024-08-28 | End: 2024-08-28 | Stop reason: HOSPADM

## 2024-08-28 RX ORDER — HYDROXYZINE HYDROCHLORIDE 25 MG/1
25 TABLET, FILM COATED ORAL EVERY 4 HOURS PRN
Qty: 40 TABLET | Refills: 0 | DISCHARGE
Start: 2024-08-28 | End: 2024-09-05

## 2024-08-28 RX ORDER — OXYCODONE HYDROCHLORIDE 5 MG/1
5 TABLET ORAL EVERY 6 HOURS PRN
Status: DISCONTINUED | OUTPATIENT
Start: 2024-08-28 | End: 2024-08-28 | Stop reason: HOSPADM

## 2024-08-28 RX ORDER — TRAMADOL HYDROCHLORIDE 50 MG/1
50 TABLET ORAL 3 TIMES DAILY
Qty: 10 TABLET | Refills: 0 | Status: SHIPPED | DISCHARGE
Start: 2024-08-28 | End: 2024-08-29

## 2024-08-28 RX ADMIN — MICONAZOLE NITRATE: 2 POWDER TOPICAL at 08:47

## 2024-08-28 RX ADMIN — TRAMADOL HYDROCHLORIDE 50 MG: 50 TABLET, COATED ORAL at 13:07

## 2024-08-28 RX ADMIN — DULOXETINE HYDROCHLORIDE 60 MG: 60 CAPSULE, DELAYED RELEASE ORAL at 08:35

## 2024-08-28 RX ADMIN — METHOCARBAMOL 750 MG: 500 TABLET ORAL at 08:37

## 2024-08-28 RX ADMIN — PANTOPRAZOLE SODIUM 20 MG: 20 TABLET, DELAYED RELEASE ORAL at 08:36

## 2024-08-28 RX ADMIN — ACETAMINOPHEN 1000 MG: 500 TABLET, FILM COATED ORAL at 13:08

## 2024-08-28 RX ADMIN — BUPROPION HYDROCHLORIDE 100 MG: 100 TABLET, FILM COATED, EXTENDED RELEASE ORAL at 08:35

## 2024-08-28 RX ADMIN — TRAMADOL HYDROCHLORIDE 50 MG: 50 TABLET, COATED ORAL at 08:46

## 2024-08-28 RX ADMIN — FLUTICASONE FUROATE AND VILANTEROL TRIFENATATE 1 PUFF: 100; 25 POWDER RESPIRATORY (INHALATION) at 08:46

## 2024-08-28 RX ADMIN — METHOCARBAMOL 750 MG: 500 TABLET ORAL at 13:07

## 2024-08-28 RX ADMIN — TORSEMIDE 20 MG: 20 TABLET ORAL at 08:37

## 2024-08-28 RX ADMIN — APIXABAN 2.5 MG: 2.5 TABLET, FILM COATED ORAL at 08:36

## 2024-08-28 RX ADMIN — METOPROLOL TARTRATE 75 MG: 50 TABLET, FILM COATED ORAL at 08:36

## 2024-08-28 RX ADMIN — ACETAMINOPHEN 1000 MG: 500 TABLET, FILM COATED ORAL at 08:36

## 2024-08-28 ASSESSMENT — ACTIVITIES OF DAILY LIVING (ADL)
ADLS_ACUITY_SCORE: 51

## 2024-08-28 NOTE — PROGRESS NOTES
Observation goals     -diagnostic tests and consults completed and resulted: Not Met, SW following  -vital signs normal or at patient baseline:Met  -safe disposition plan has been identified:Not met  Nurse to notify provider when observation goals have been met and patient is ready for discharge.

## 2024-08-28 NOTE — PLAN OF CARE
Goal Outcome Evaluation:    Patient approved for discharge per Valorie Garcia MD orders.  Reviewed the AVS and med list with the patient.  All questions were answered.  Discharge meds were sent to [facility]. Transport ride set up for [id number] today.  Packet printed for transport.  All patient belongings are packed to bring to TCU.

## 2024-08-28 NOTE — PROGRESS NOTES
Orthopedic Surgery  Moira Andre  08/28/2024     Admit Date:  8/23/2024  Questionable right hip pain, in setting of chronic back pain.  Follows at [facility] for epidural steroid injections every 6 months.       Patient is resting in bed this morning, just waking up.  Upon waking up she endorses stiffness of bilateral lower extremities.  She is requesting pain control at this time  No acute events overnight.  Will likely be discharging today.  I stop by to discuss MRI results of right hip    Temp:  [97.8  F (36.6  C)-98.3  F (36.8  C)] 97.9  F (36.6  C)  Pulse:  [70-78] 74  Resp:  [20-22] 20  BP: (124-177)/(60-91) 131/67  SpO2:  [91 %-95 %] 93 %    Alert and oriented  Dressing is clean, dry, and intact.   Minimal erythema of the surrounding skin.   Bilateral calves are soft, non-tender.  Right lower extremity is NVI.      Labs:  Recent Labs   Lab Test 08/27/24  0621 08/24/24  0703 08/23/24  1629   WBC 10.7 8.3 11.0   HGB 12.4 11.9 12.9    272 369     Recent Labs   Lab Test 07/19/17  0655 09/21/16  1300   INR 0.93 0.99     MRI RIGHT HIP (8/27/2024)   Impression: Patient motion significantly degrades images.     1. No gross acute fracture.     2. Advanced degenerative changes of right hip joint with moderate  effusion.           PLAN:   No acute finding of fracture on review of MRI.  I do suspect lumbar spine etiology for the cause of her pain.  She does endorse bilateral lower extremity pain this morning.  There are no immediate orthopedic interventions recommended from our specific orthopedic trauma team at this time   I recommend mobilize PT/OT   I am discontinuing the bedrest order, she is weightbearing as tolerated   I encouraged pain regimen including muscle relaxer or Atarax if needed   She may find relief from warm compresses and heat    Our team will sign off at this time but please let us know if you have any further questions or concerns       aPris Galicia PA-C

## 2024-08-28 NOTE — PROGRESS NOTES
Observation goals     -diagnostic tests and consults completed and resulted: Met  -vital signs normal or at patient baseline: Met  -safe disposition plan has been identified: Not met, SW following  Nurse to notify provider when observation goals have been met and patient is ready for discharge.

## 2024-08-28 NOTE — PROGRESS NOTES
Orientation: A/O x3, forgetful and delirium at time    Vitals/Tele: VSS on RA     IV Access/drains: L PIV -SL    Diet: Cardiac- no caffeine diet    Mobility: A1-2 GB/W    GI/: Incontinent of B/B to BSC, purewick in place    Wound/Skin: Forehead abrasion, abd and breast folds redness, and scattered bruising.     Consults: WOC following    Discharge Plan: TBD      See Flow sheets for assessment

## 2024-08-28 NOTE — PROGRESS NOTES
PRIMARY Concern: Fall  SAFETY RISK Concerns (fall risk, behaviors, etc.): Fall risk       Isolation/Type: NA  Tests/Procedures for NEXT shift: Labs   Consults? (Pending/following, signed-off?) SW following for placement  Where is patient from? (Home, TCU, etc.): Home  Other Important info for NEXT shift: MRI did not show hip fracture. Patient becomes very emotional when confused.  Anticipated DC date & active delays: TBD, needs placement   _____________________________________________  SUMMARY NOTE:  Orientation/Cognitive: A&Ox2-3, forgetful. Delirium at times.   Observation Goals (Met/ Not Met): Not met  Mobility Level/Assist Equipment: A1-2 gb/w    Antibiotics & Plan (IV/po, length of tx left): NA  Pain Management: Scheduled robaxin and Tylenol.   Tele/VS/O2: VSS on RA  ABNL Lab/BG: UW=849,121  Diet: cardiac no caffeine  Bowel/Bladder: Incontinent of urine   (Purewick), continent of bowel. Up to BSC.  Skin Concerns: Redness/moist area to abd fold and breast folds, abrasion to forehead, and scattered bruising. Wound care completed this shift in folds, ordered for BID.  Drains/Devices: PIV SL, Purewick  Patient Stated Goal for Today: Rest    Observation goals     -diagnostic tests and consults completed and resulted: Not Met, SW following  -vital signs normal or at patient baseline:Met  -safe disposition plan has been identified:Not met  Nurse to notify provider when observation goals have been met and patient is ready for discharge.

## 2024-08-28 NOTE — PROGRESS NOTES
PRIMARY Concern: Fall  SAFETY RISK Concerns (fall risk, behaviors, etc.): Fall risk       Isolation/Type: NA  Tests/Procedures for NEXT shift: Labs   Consults? (Pending/following, signed-off?) SW following for placement  Where is patient from? (Home, TCU, etc.): Home  Other Important info for NEXT shift: MRI did not show hip fracture. Patient becomes very emotional when confused.  Anticipated DC date & active delays: Pt approved for discharge today 8/28, with ride set up from [id number]  _____________________________________________  SUMMARY NOTE:  Orientation/Cognitive: A&Ox2-3, forgetful. Delirium at times.   Observation Goals (Met/ Not Met): met  Mobility Level/Assist Equipment: A1-2 GB/WK  Antibiotics & Plan (IV/po, length of tx left): NA  Pain Management: Scheduled robaxin and Tylenol  Tele/VS/O2: VSS on RA  ABNL Lab/BG: , 110, 238  Diet: cardiac diet no caffeine  Bowel/Bladder: Incontinent of urine, Purewick in place.  Cont bowel in BSC, no BM this shift.      Skin Concerns: Redness/moist area to abd fold and breast folds, abrasion to forehead, and scattered bruising. Wound cares completed this shift per order set.  Drains/Devices: PIV SL, Purewick  Patient Stated Goal for Today: Rest     Observation goals     -diagnostic tests and consults completed and resulted: Met   -vital signs normal or at patient baseline: Met  -safe disposition plan has been identified: Met, pt going to [facility]  Nurse to notify provider when observation goals have been met and patient is ready for discharge.

## 2024-08-28 NOTE — PROGRESS NOTES
Care Management Discharge Note    Discharge Date: 08/28/2024       Discharge Disposition: Transitional Care    Discharge Services: Transportation Services    Discharge DME:      Discharge Transportation: [facility] wheelchair [phone]    Private pay costs discussed: transportation costs    Does the patient's insurance plan have a 3 day qualifying hospital stay waiver?  Yes     Which insurance plan 3 day waiver is available? Alternative insurance waiver    Will the waiver be used for post-acute placement? Yes    PAS Confirmation Code:    Patient/family educated on Medicare website which has current facility and service quality ratings:      Education Provided on the Discharge Plan:    Persons Notified of Discharge Plans: Hospitalist, [facility], Charge RN, bedside RN, daughter- left VM  Patient/Family in Agreement with the Plan: unable to assess    Handoff Referral Completed: Yes, [facility] PCP: Internal handoff referral completed    Additional Information:  Patient is medically ready for discharge, [facility] has open shared room available after 1500 today. Sent discharge orders to TCU. Completed PAS. Left VM for daughter Sophia with update, informed that facility only has a shared room open today but SW can request they put patient on a list to move to private once one is available. Faxed scripts to TCU.    PAS-RR    D: Per DHS regulation, SW completed and submitted PAS-RR to [place] [employer] Direct Connect via the Senior LinkAge Line.  PAS-RR confirmation # is : [id number]    P: Further questions may be directed to Senior LinkAge Line at #[phone], option #4 for PAS-RR staff.     Addendum 1054: Spoke with daughter, who requested a call from the hospitalist to go over the MRI results. Updated hospitalist.     STEF Dominguez  Social Work  [facility]

## 2024-08-28 NOTE — CONSULTS
"SPIRITUAL HEALTH SERVICES - Consult Note   [facility] Short Stay     Referral Source: Follow Up Request      I visited Kristyn per follow up.    Kristyn shared that she will be discharging to a \"rehab\" today. She shared that she has been to a TCU in the past.  She shared that she \"doesn't remember yesterday at all\" and that missing chunks of time is \"upsetting.\" We discussed how hospitals can be disorienting and how it can be helpful to take things one day at a time.  She requested prayers for \"peace of mind\" as she has been having stressful dreams. Prayer was provided.      Plan: No follow up planned at this time as Kristyn expects to discharge today.       Juana Patterson (they/them)Sarah  Spiritual Health Services  Chaplain Resident    [facility] routine referrals?*[phone]  [facility] available 24/7 for emergent requests/referrals, either by paging the on-call  or by entering an ASAP/STAT consult in Epic (this will also page the on-call ).    "

## 2024-08-28 NOTE — DISCHARGE SUMMARY
[facility]  Hospitalist Discharge Summary      Date of Admission:  8/23/2024  Date of Discharge:  8/28/2024  Discharging Provider: Valorie Garcia MD  Discharge Service: Hospitalist Service    Discharge Diagnoses   Moira Andre is a 89 year old female with PMH of diastolic CHF, CKD4, HTN, recurrent falls, atrial fibrillation s/p ablation and pacemaker, ASCVD, DM2, depression, GERD, asthma, who presents with a fall.      Mechanical fall  Right hip and leg discomfort  Mild rhabdomyolysis- Resolved  Recurrent falls  Patient has recent frequent falls--recently hospitalized 8/3 to 8/7 for a fall, UTI, then discharged to TCU, and then discharged home independently on 8/19. She then fell on 8/21 and now again 8/23, this time hitting her head and unable to get up. CT head no acute pathology, CT cerbvical lumbar spine no fracture, CT pelvis no clear fracture. Patient reports ongoing left leg spasms and has been taking muscle relaxants and oxycodone at home which increases her risk for a fall. Her falls are likely multifactorial. She may need a higher level of care like [facility] which patient states she is looking for currently.  -- On exam RLE is shortened and laterally rotated.  X-ray of the right hip, CT scan of the pelvis returned negative for fractures  Orthopedic surgery consultation requested  MRI of the right hip recommended.  MRI of the right hip done on 8/27/2024 showed severe arthritis of the right hip, moderate right hip joint effusion noted.  No acute fracture noted  Orthopedic surgery recommended pain control, PT OT, weightbearing as tolerated  Patient is having worsening confusion with oxycodone for pain  Going forward pain control discussed with patient and her daughter, Tylenol 1000 mg every 8 hours, Robaxin-750 milligrams 4 times daily, Atarax 25 mg as needed for pain, tramadol 50 mg p.o. 3 times daily, tramadol 50 mg p.o. every 6 hours as needed for pain ordered  Mild acute metabolic  encephalopathy versus underlying cognitive decline;  Patient was having intermittent mild confusion and forgetfulness.  At risk of worsening delirium and confusion with oral oxycodone  Oxycodone was discontinued.  Patient was started on oral tramadol for pain control  Reorient frequently  Maintain sleep-wake cycle  Will benefit from outpatient neuropsych testing     Bilateral abdominal folds candidiasis  On examination, patient has candidiasis under the breast fold area and in abdominal folds  -- WOC consulted; appreciate recommendations  -- Continue micafungin powder twice daily      Possible urinary tract infection;  Patient was recently hospitalized for E. coli UTI.  CT lumbar spine does show 9 mm right kidney stone, no associated hydronephrosis, some fat stranding concerning for chronic urinary tract infection.  As above, due to the concern for mild delirium will treat possible UTI  Right 9 mm stone:  Patient was started on IV ceftriaxone for possible UTI.  Urine culture with 10-50 K of mixture of urogenital tyler  IV ceftriaxone was discontinued on 8/25/2024     Atrial fibrillation s/p ablation and pacemaker  Diastolic CHF  HTN  ASCVD  Does not appear volume oveloaded here.  -- PTA apixaban, metoprolol  Restarted PTA regimen on discharge    CKD4: Cr near baseline , seems to be between 1.8-2.0  -- Patient received hydration in the beginning, now creatinine is down to 1.71 this morning. \\  -- PTA torsemide restarted 8/25, held due to increase in creatinine again to 1.82. Given improvement in creatinine 8/27, torsemide resumed.     DM2: Not on any PTA antidiabetics. Last A1c 7.2%.  here  - MDSSI  - Glucose checks AC and HS  - Hypoglycemia protocol     Depression: PTA bupropion  GERD: PTA PPI  Asthma: PTA inhaler           Observation Goals: -diagnostic tests and consults completed and resulted, -vital signs normal or at patient baseline, -safe disposition plan has been identified, Nurse to notify provider  "when observation goals have been met and patient is ready for discharge.  Diet: Combination Diet Low Saturated Fat Na <2400mg Diet, No Caffeine Diet    DVT Prophylaxis: DOAC  Charles Catheter: Not present  Lines: None     Cardiac Monitoring: None  Code Status: Full Code          Clinically Significant Risk Factors Present on Admission               # Drug Induced Coagulation Defect: home medication list includes an anticoagulant medication    # Hypertension: Noted on problem list  # Chronic heart failure with preserved ejection fraction: heart failure noted on problem list and last echo with EF >50%         # DMII: A1C = 7.2 % (Ref range: 0.0 - 5.6 %) within past 6 months    # Obesity: Estimated body mass index is 37.57 kg/m  as calculated from the following:    Height as of this encounter: 1.702 m (5' 7\").    Weight as of this encounter: 108.8 kg (239 lb 13.8 oz).       # Financial/Environmental Concerns:    # Asthma: noted on problem list  # Pacemaker present                         Clinically Significant Risk Factors     # DMII: A1C = 7.2 % (Ref range: 0.0 - 5.6 %) within past 6 months  # Obesity: Estimated body mass index is 37.57 kg/m  as calculated from the following:    Height as of this encounter: 1.702 m (5' 7\").    Weight as of this encounter: 108.8 kg (239 lb 13.8 oz).       Follow-ups Needed After Discharge   Follow-up Appointments     Follow Up and recommended labs and tests      Follow up with residential physician.  The following labs/tests are   recommended: CBC, BMP in 1week .           Unresulted Labs Ordered in the Past 30 Days of this Admission       No orders found from 7/24/2024 to 8/24/2024.        Daughter Krystina was called on 8/28/2024 and care plan was discussed with the daughter and she is agreeable    Valorie Garcia MD   Available on [facility]      Discharge Disposition   Discharged to short-term care facility  Condition at discharge: Stable        Consultations This Hospital Stay   CARE " MANAGEMENT / SOCIAL WORK IP CONSULT  PHYSICAL THERAPY ADULT IP CONSULT  WOUND OSTOMY CONTINENCE NURSE  IP CONSULT  ORTHOPEDIC SURGERY IP CONSULT  SPIRITUAL HEALTH SERVICES IP CONSULT  PHYSICAL THERAPY ADULT IP CONSULT  OCCUPATIONAL THERAPY ADULT IP CONSULT  PHYSICAL THERAPY ADULT IP CONSULT  OCCUPATIONAL THERAPY ADULT IP CONSULT  SPIRITUAL HEALTH SERVICES IP CONSULT    Code Status   Full Code    Time Spent on this Encounter   IValorie MD, personally saw the patient today and spent greater than 30 minutes discharging this patient.       Valorie Garcia MD  [facility]  [address] [place] [id number]  Phone: [phone]  ______________________________________________________________________    Physical Exam   Vital Signs: Temp: 97.9  F (36.6  C) Temp src: Oral BP: 131/67 Pulse: 74   Resp: 20 SpO2: 93 % O2 Device: None (Room air)    Weight: 239 lbs 13.77 oz  General Appearance: Alert awake, pleasant female sitting comfortably in bed, not in acute distress  Respiratory: Clear to auscultation bilaterally  Cardiovascular: Normal rate rhythm regular, no murmurs rubs or gallops  GI: Soft, nontender nondistended bowel sounds positive  Skin: Ecchymosis on left forehead noted  Other right lower extremities slightly shorter and externally rotated compared to left lower extremity       Primary Care Physician   Maryan Churchill    Discharge Orders      Primary Care - Care Coordination Referral      General info for SNF    Length of Stay Estimate: Short Term Care: Estimated # of Days 31-90  Condition at Discharge: Stable  Level of care:skilled   Rehabilitation Potential: Good  Admission H&P remains valid and up-to-date: Yes  Recent Chemotherapy: N/A  Use Nursing Home Standing Orders: Yes     Mantoux instructions    Give two-step Mantoux (PPD) Per Facility Policy Yes     Follow Up and recommended labs and tests    Follow up with longterm physician.  The  following labs/tests are recommended: CBC, BMP in 1week .     Reason for your hospital stay    Right hip and RLE pain secondary to fall and Arthritis of hip     Activity - Up with nursing assistance     Full Code     Physical Therapy Adult Consult    Evaluate and treat as clinically indicated.    Reason:  Right hip and RLE pain after a fall     Occupational Therapy Adult Consult    Evaluate and treat as clinically indicated.    Reason:  Right hip and RLE pain after a fall     Fall precautions     Diet    Follow this diet upon discharge: Current Diet:Orders Placed This Encounter      Combination Diet Low Saturated Fat Na <2400mg Diet       Significant Results and Procedures   Most Recent 3 CBC's:  Recent Labs   Lab Test 08/27/24  0621 08/24/24  0703 08/23/24  1629   WBC 10.7 8.3 11.0   HGB 12.4 11.9 12.9   MCV 85 86 84    272 369     Most Recent 3 BMP's:  Recent Labs   Lab Test 08/28/24  1059 08/28/24  0746 08/28/24  0658 08/27/24  0736 08/27/24  0621 08/26/24  0740 08/26/24  0602 08/25/24  0732 08/25/24  0656   NA  --   --   --   --  139  --  138  --  139   POTASSIUM  --   --   --   --  4.4  --  4.0  --  4.5   CHLORIDE  --   --   --   --  100  --  100  --  103   CO2  --   --   --   --  23  --  24  --  17*   BUN  --   --   --   --  33.2*  --  36.1*  --  37.6*   CR  --   --   --   --  1.68*  --  1.82*  --  1.71*   ANIONGAP  --   --   --   --  16*  --  14  --  19*   TELLY  --   --   --   --  9.1  --  9.1  --  9.6   * 110* 130*   < > 142*   < > 123*   < > 157*    < > = values in this interval not displayed.     Most Recent 2 LFT's:  Recent Labs   Lab Test 08/23/24  1629 08/01/24  1618   AST 14 17   ALT 8 7   ALKPHOS 80 75   BILITOTAL 0.6 0.8     Most Recent 3 INR's:  Recent Labs   Lab Test 07/19/17  0655 09/21/16  1300   INR 0.93 0.99     Most Recent INR's and Anticoagulation Dosing History:  Anticoagulation Dose History  More data exists         Latest Ref Rng & Units 7/21/2007 9/25/2007 10/8/2007  10/12/2007 8/13/2013 9/21/2016 7/19/2017   Recent Dosing and Labs   INR 0.86 - 1.14 0.94  1.01  1.09  1.06  1.03  0.99  0.93       Details                 Most Recent 3 Creatinines:  Recent Labs   Lab Test 08/27/24  0621 08/26/24  0602 08/25/24  0656   CR 1.68* 1.82* 1.71*     Most Recent 3 Hemoglobins:  Recent Labs   Lab Test 08/27/24  0621 08/24/24  0703 08/23/24  1629   HGB 12.4 11.9 12.9     Most Recent 3 Troponin's:  Recent Labs   Lab Test 03/16/19  0726 11/01/18  1242 11/01/18  0345   TROPI <0.015 <0.015 <0.015     Most Recent 3 BNP's:  Recent Labs   Lab Test 08/01/24  1618 07/11/24  1652 04/19/24  0617   NTBNPI 5,198* 14,392* 5,685*     Most Recent D-dimer:  Recent Labs   Lab Test 02/16/22  1750   DD 0.61*     Most Recent Cholesterol Panel:  Recent Labs   Lab Test 06/27/24  1255   CHOL 190      HDL 48*   TRIG 208*     7-Day Micro Results       Collected Updated Procedure Result Status      08/23/2024 1854 08/25/2024 0835 Urine Culture [[id number]]   Urine, Midstream    Final result Component Value   Culture 10,000-50,000 CFU/mL Mixture of Urogenital Roberta               08/23/2024 1633 08/23/2024 1710 Asymptomatic COVID-19 Virus (Coronavirus) by PCR Nasopharyngeal [[id number]]    Swab from Nasopharyngeal    Final result Component Value   SARS CoV2 PCR Negative   NEGATIVE: SARS-CoV-2 (COVID-19) RNA not detected, presumed negative.                  Most Recent TSH and T4:  Recent Labs   Lab Test 08/23/24  1629   TSH 2.01     Most Recent Hemoglobin A1c:  Recent Labs   Lab Test 06/27/24  1255   A1C 7.2*     Most Recent 6 glucoses:  Recent Labs   Lab Test 08/28/24  1059 08/28/24  0746 08/28/24  0658 08/27/24  2246 08/27/24  1812 08/27/24  1205   * 110* 130* 121* 145* 169*     Most Recent Urinalysis:  Recent Labs   Lab Test 08/23/24  1854 08/01/24  1610 07/11/24  1132   COLOR Yellow   < > Yellow   APPEARANCE Slightly Cloudy*   < > Cloudy*   URINEGLC Negative   < > Negative   URINEBILI Negative   <  > Negative   URINEKETONE Negative   < > Negative   SG 1.019   < > 1.015   UBLD Large*   < > Moderate*   URINEPH 5.0   < > 5.5   PROTEIN 50*   < > 100*   UROBILINOGEN  --   --  0.2   NITRITE Negative   < > Negative   LEUKEST Small*   < > Large*   RBCU >182*   < > 0-2   WBCU 12*   < > >100*    < > = values in this interval not displayed.     Most Recent ABG:No lab results found.  Most Recent ESR & CRP:  Recent Labs   Lab Test 08/24/20  0951   SED 36*   CRP 10.0*     Most Recent Anemia Panel:  Recent Labs   Lab Test 08/27/24  0621 07/11/24  1140 06/27/24  1255 10/10/23  1438 08/23/23  0959 02/16/22  1629 08/27/21  1136   WBC 10.7   < >  --    < >  --    < > 9.1   HGB 12.4   < >  --    < > 13.8   < > 14.2   HCT 40.0   < >  --    < >  --    < > 44.2   MCV 85   < >  --    < >  --    < > 82      < >  --    < >  --    < > 315   IRON  --   --   --   --  41  --   --    IRONSAT  --   --   --   --  14*  --   --    FEB  --   --   --   --  302  --   --    CURT  --   --  201   < > 139   < > 156   B12  --   --   --   --   --   --  435    < > = values in this interval not displayed.     Most Recent CPK:  Recent Labs   Lab Test 08/24/24  0703 08/23/24  1629 08/24/20  0951    196* 68   ,   Results for orders placed or performed during the hospital encounter of 08/23/24   Head CT w/o contrast    Narrative    EXAM: CT HEAD W/O CONTRAST  LOCATION: [facility]  DATE: 8/23/2024    INDICATION: Frequent falls with forehead contusion.  COMPARISON: CT 8/6/2024.  TECHNIQUE: Routine CT Head without IV contrast. Multiplanar reformats. Dose reduction techniques were used.    FINDINGS:  INTRACRANIAL CONTENTS: No intracranial hemorrhage, extraaxial collection, or mass effect.  No CT evidence of acute infarct. Small chronic lacunar infarct left basal ganglia. Mild to moderate presumed chronic small vessel ischemic changes. Mild to   moderate generalized volume loss. No hydrocephalus.     VISUALIZED  ORBITS/SINUSES/MASTOIDS: No intraorbital abnormality. No paranasal sinus mucosal disease. No middle ear or mastoid effusion.    BONES/SOFT TISSUES: No acute abnormality.      Impression    IMPRESSION:  1.  No CT evidence for acute intracranial process.  2.  Brain atrophy and presumed chronic microvascular ischemic changes as above.   CT Cervical Spine w/o Contrast    Narrative    EXAM: CT CERVICAL SPINE W/O CONTRAST  LOCATION: [facility]  DATE: 8/23/2024    INDICATION: Frequent fall, head injury.  COMPARISON: None.  TECHNIQUE: Routine CT Cervical Spine without IV contrast. Multiplanar reformats. Dose reduction techniques were used.    FINDINGS:  VERTEBRA: Normal vertebral body heights. Anterolisthesis C4-C5, reversal of lordosis, apex at C5-C6.. No fracture or posttraumatic subluxation.     CANAL/FORAMINA: At C3-4, severe left foraminal stenosis. At C4-5, moderate left foraminal stenosis. At C5-6, moderate right foraminal stenosis.     PARASPINAL: No extraspinal abnormality.      Impression    IMPRESSION:  1.  No fracture or posttraumatic subluxation.     CT Lumbar Spine w/o Contrast    Narrative    EXAM: CT LUMBAR SPINE W/O CONTRAST  LOCATION: [facility]  DATE: 8/23/2024    INDICATION: Fall, back pain.  COMPARISON: CT 11/26/2024.  TECHNIQUE: Routine CT Lumbar Spine without IV contrast. Multiplanar reformats. Dose reduction techniques were used.     FINDINGS:  VERTEBRA: Grade 1 anterolisthesis L3-L4 and L5-S1. Normal vertebral body heights. No fracture or posttraumatic subluxation. Osteopenic bones.    CANAL/FORAMINA: At L1-2, moderate central and severe bilateral foraminal stenosis. At L2-3, severe central stenosis, moderate to severe bilateral foraminal stenosis. At L3-4, severe central and severe bilateral foraminal stenosis. At L4-5, severe central   and severe right foraminal stenosis. At L5-S1, mild central and severe right foraminal stenosis.    PARASPINAL:  Right renal pelvis stone measures 9 mm, no hydronephrosis, moderate adjacent chronic fat stranding. Large simple cyst right kidney. Moderate sacroiliac joint degenerative disease.      Impression    IMPRESSION:  1.  No fracture or posttraumatic subluxation.  2.  Multilevel high-grade spinal canal and foraminal stenosis.  3.  Right kidney stone, measures 9 mm. No hydronephrosis. Moderate surrounding chronic fat stranding unchanged from prior CT of 11/26/2023, may be secondary to chronic urinary infection.   CT Pelvis Bone wo Contrast    Narrative    EXAM: CT PELVIS BONE WO CONTRAST  LOCATION: [facility]  DATE: 8/23/2024    INDICATION: Fall, trauma, leg pain  COMPARISON: None.  TECHNIQUE: CT scan of the pelvis was performed without IV contrast. Multiplanar reformats were obtained. Dose reduction techniques were used.  CONTRAST: None.    FINDINGS:    No acute fracture is identified. There is diffuse osseous demineralization.    There is moderate to advanced right hip degenerative arthrosis. Mild left hip degenerative arthrosis. There are also degenerative changes within the lower lumbar spine and at the sacroiliac joints and pubic symphysis.    There is a right hip joint effusion. Vascular atherosclerotic calcifications are noted. There is a large ventral abdominal hernia containing multiple loops of nondilated bowel.    No free pelvic fluid. No drainable fluid collection.      Impression    IMPRESSION:  1.  No acute fracture is identified. Given the degree of osseous demineralization, MRI would be more sensitive for nondisplaced fracture and should be considered if there is persistent clinical concern.  2.  Moderate to advanced right hip degenerative arthrosis. There is right hip joint effusion.  3.  Additional chronic-appearing findings, as described.   MR Hip Right w/o Contrast    Narrative    MR right hip without contrast 8/27/2024 2:49 PM    Techniques: Multiplanar multisequence imaging  of the right hip was  obtained without  administration of intra-articular or intravenous  contrast using routine protocol.    Note: Patient was medicated twice but declined to hold still.     History: right hip pain; Hip pain; No further details     Comparison: CT pelvis 8/23/2024    Findings:  Substantial motion artifact limits detailed evaluation.     No gross hip fracture. Advanced degenerative change changes of right  femoral acetabular joint evidenced by high-grade joint space loss,  opposing subchondral edema-like signal change. Moderate hip joint  effusion.     Possible high-grade tear at the gluteus minimus trochanteric  attachment. Substantial fatty replacement of the gluteal musculature.  Edema like signal in the adductor compartment and within included  iliacus muscle.    Partially included large ventral abdominal hernia containing multiple  loops of nondilated bowel better depicted on prior CT.      Impression    Impression: Patient motion significantly degrades images.    1. No gross acute fracture.    2. Advanced degenerative changes of right hip joint with moderate  effusion.    I have personally reviewed the examination and initial interpretation  and I agree with the findings.    GAUDENCIO HOOVER MD (Joe)         SYSTEM ID:  [id number]   XR Femur Right 2 Views    Narrative    EXAM: XR FEMUR RIGHT 2 VIEWS  LOCATION: [facility]  DATE: 8/25/2024    INDICATION: knee pain  COMPARISON: None.      Impression    IMPRESSION: No hip fracture or dislocation. If there is persistent clinical concern for occult hip fracture, consider MRI which is more sensitive. Right knee arthroplasty with normal alignment. Dense vascular calcifications.     *Note: Due to a large number of results and/or encounters for the requested time period, some results have not been displayed. A complete set of results can be found in Results Review.       Discharge Medications   Current Discharge Medication  List        START taking these medications    Details   hydrOXYzine HCl (ATARAX) 25 MG tablet Take 1 tablet (25 mg) by mouth every 4 hours as needed for other (adjuvant pain).  Qty: 40 tablet, Refills: 0    Associated Diagnoses: Hip pain, right      methocarbamol (ROBAXIN) 750 MG tablet Take 1 tablet (750 mg) by mouth 4 times daily.  Qty: 90 tablet, Refills: 0    Associated Diagnoses: Hip pain, right      senna-docusate (SENOKOT-S/PERICOLACE) 8.6-50 MG tablet Take 2 tablets by mouth 2 times daily as needed for constipation.  Qty: 30 tablet, Refills: 0    Associated Diagnoses: Hip pain, right      !! traMADol (ULTRAM) 50 MG tablet Take 1 tablet (50 mg) by mouth 3 times daily.  Qty: 10 tablet, Refills: 0    Associated Diagnoses: Hip pain, right      !! traMADol (ULTRAM) 50 MG tablet Take 1 tablet (50 mg) by mouth every 6 hours as needed for severe pain.  Qty: 10 tablet    Associated Diagnoses: Hip pain, right       !! - Potential duplicate medications found. Please discuss with provider.        CONTINUE these medications which have CHANGED    Details   acetaminophen (TYLENOL) 500 MG tablet Take 2 tablets (1,000 mg) by mouth 3 times daily.  Qty: 100 tablet, Refills: 0    Associated Diagnoses: Hip pain, right           CONTINUE these medications which have NOT CHANGED    Details   albuterol (PROAIR HFA/PROVENTIL HFA/VENTOLIN HFA) 108 (90 Base) MCG/ACT inhaler Inhale 2 puffs into the lungs every 6 hours as needed for shortness of breath, wheezing or cough For shortness of breath    Comments: Pharmacy may dispense brand covered by insurance (Proair, or proventil or ventolin or generic albuterol inhaler)  Associated Diagnoses: SOB (shortness of breath)      apixaban ANTICOAGULANT (ELIQUIS) 2.5 MG tablet Take 1 tablet (2.5 mg) by mouth 2 times daily  Qty: 180 tablet, Refills: 3    Associated Diagnoses: Atrial fibrillation with RVR (H)      atorvastatin (LIPITOR) 20 MG tablet Take 1 tablet (20 mg) by mouth daily for  cholesterol  Qty: 100 tablet, Refills: 3    Associated Diagnoses: Hyperlipidemia LDL goal <70      buPROPion (WELLBUTRIN SR) 100 MG 12 hr tablet Take 1 tablet (100 mg) by mouth daily for mood  Qty: 90 tablet, Refills: 3    Associated Diagnoses: Moderate major depression (H)      calcium carbonate (TUMS) 500 MG chewable tablet Take 1 chew tab by mouth 2 times daily as needed for heartburn      Cholecalciferol (VITAMIN D) 125 MCG (5000 UT) capsule Take 1 tablet by mouth every evening      DULoxetine (CYMBALTA) 60 MG capsule Take 1 capsule (60 mg) by mouth daily  Qty: 90 capsule, Refills: 3    Associated Diagnoses: Moderate major depression (H)      fexofenadine (ALLEGRA) 60 MG tablet Take 60 mg by mouth daily      fluticasone-salmeterol (WIXELA INHUB) 100-50 MCG/ACT inhaler USE 1 INHALATION BY MOUTH EVERY  12 HOURS  Qty: 180 each, Refills: 3    Associated Diagnoses: Mild intermittent asthma without complication      Menthol, Topical Analgesic, (ICY HOT MEDICATED SPRAY EX) Externally apply topically daily as needed (pain, in neck, back, hand)      metoprolol tartrate (LOPRESSOR) 25 MG tablet Take 3 tablets (75 mg) by mouth 2 times daily  Qty: 540 tablet, Refills: 1    Comments: Dose is changed  Associated Diagnoses: Atrial fibrillation with RVR (H)      miconazole (MICATIN) 2 % external cream Apply topically 2 times daily as needed for other (rash/irritation)      nitroGLYcerin (NITROSTAT) 0.4 MG sublingual tablet FOR CHEST PAIN PLACE 1 TABLET UNDER THE TONGUE EVERY 5 MINUTES FOR 3 DOSES. IF SYMPTOMS PERSIST 5 MINUTES AFTER 1ST DOSE CALL 911  Qty: 25 tablet, Refills: 0    Associated Diagnoses: Chest pain, unspecified type      pantoprazole (PROTONIX) 20 MG EC tablet Take 1 tablet (20 mg) by mouth daily  Qty: 90 tablet, Refills: 3    Associated Diagnoses: Gastroesophageal reflux disease without esophagitis      torsemide (DEMADEX) 20 MG tablet Take 1 tablet (20 mg) by mouth 2 times daily  Qty: 60 tablet, Refills: 1     Associated Diagnoses: Heart failure with preserved ejection fraction, NYHA class I (H)      triamcinolone (KENALOG) 0.1 % external cream Apply topically 2 times daily as needed for irritation      blood glucose (ACCU-CHEK GUIDE) test strip Use to test blood sugar once daily or as directed.  Qty: 100 strip, Refills: 3    Associated Diagnoses: Type 2 diabetes mellitus with diabetic nephropathy, without long-term current use of insulin (H)      blood glucose (ACCU-CHEK SOFTCLIX) lancing device Lancing device to be used with lancets.  Qty: 1 each, Refills: 0    Associated Diagnoses: Type 2 diabetes mellitus with diabetic nephropathy, without long-term current use of insulin (H)      blood glucose monitoring (SOFTCLIX) lancets Use to test blood sugar once daily.  Qty: 100 each, Refills: 3    Associated Diagnoses: Type 2 diabetes mellitus with diabetic nephropathy, without long-term current use of insulin (H)           STOP taking these medications       cyclobenzaprine (FLEXERIL) 5 MG tablet Comments:   Reason for Stopping:         oxyCODONE (ROXICODONE) 5 MG tablet Comments:   Reason for Stopping:             Allergies   Allergies   Allergen Reactions    Aspirin Hives     Reaction occurred during childhood.     Lidocaine Itching    Lisinopril Cough    Losartan      Hyperkalemia      Metformin      Elevated lactic acid    Minocycline      Yellow Dye Allergy. Minocycline has Yellow Dye #10.    Mounjaro [Tirzepatide] Diarrhea and GI Disturbance    Salicylates Hives    Yellow Dye Hives     Rxn to yellow tablet. Eyes swelled shut.     Yellow Dyes (Non-Tartrazine) Hives

## 2024-08-29 ENCOUNTER — PATIENT OUTREACH (OUTPATIENT)
Dept: CARE COORDINATION | Facility: CLINIC | Age: 89
End: 2024-08-29

## 2024-08-29 DIAGNOSIS — I48.91 ATRIAL FIBRILLATION WITH RVR (H): ICD-10-CM

## 2024-08-29 PROCEDURE — 86481 TB AG RESPONSE T-CELL SUSP: CPT | Performed by: NURSE PRACTITIONER

## 2024-08-29 RX ORDER — METOPROLOL TARTRATE 25 MG/1
75 TABLET, FILM COATED ORAL 2 TIMES DAILY
Qty: 480 TABLET | Refills: 2 | Status: SHIPPED | OUTPATIENT
Start: 2024-08-29

## 2024-08-29 NOTE — LETTER
[facility]   To:   [facility] SW          Please give to facility    From:   Maria L Mckeon  [facility]  Care Coordinator   [facility]   P: [phone]   [email or web address]   Patient Name:  Moira Andre YOB: 1933   Admit date: 8/28/24      *Information Needed:  Please contact me when the patient will discharge (or if they will move to long term care)- include the discharge date, disposition, and main diagnosis   If the patient is discharged with home care services, please provide the name of the agency    Also- Please inform me if a care conference is being held.   Phone, Fax or Email with information                              Thank you

## 2024-08-29 NOTE — TELEPHONE ENCOUNTER
Device check completed on 8/28/24 by [employer] Rep. Results as below:    [employer] Cotati (S) Pacemaker Device Check  Patient seen in clinic for device evaluation and iterative programming.    : 97.9%    Mode: VVIR     Underlying Rhythm: permanent AF w/ CHB achieved by JENELLE RODRIGUEZ in the 30's    Heart Rate: stable, minimal variability    Sensing: DARSHAN    Pacing Threshold: WNL    Impedance: WNL    Battery Status: estimated 14.1 years remaining until RRT    Device Site: 8/29 spoke with patient's RN at [facility] ([phone]). She states that the incision site looks good and to be healing well but with 2 pinpoint scabs on either end of incision. Sounds as through patient may have a retained stitch or two. She was just admitted to TCU and her mobility isn't great. For now, I asked that [facility] send a photo of her incision to the Device.RN inbox so we can review to determine if an in clinic wound check will be necessary.  Atrial Arrhythmia: permanent AF // eliquis  Ventricular Arrhythmia: episode logbook not saved (none on 8/26)    Setting Change: none    Care Plan: remote check scheduled on     MIRZA Chowdhury      I have reviewed and interpreted the device interrogation, settings, programming and nurse's summary. The device is functioning within normal device parameters. I agree with the current findings, assessment and plan.

## 2024-08-30 LAB
GAMMA INTERFERON BACKGROUND BLD IA-ACNC: 0 IU/ML
M TB IFN-G BLD-IMP: NEGATIVE
M TB IFN-G CD4+ BCKGRND COR BLD-ACNC: 0.63 IU/ML
MITOGEN IGNF BCKGRD COR BLD-ACNC: 0 IU/ML
MITOGEN IGNF BCKGRD COR BLD-ACNC: 0 IU/ML
QUANTIFERON MITOGEN: 0.63 IU/ML
QUANTIFERON NIL TUBE: 0 IU/ML
QUANTIFERON TB1 TUBE: 0 IU/ML
QUANTIFERON TB2 TUBE: 0

## 2024-09-02 ENCOUNTER — LAB REQUISITION (OUTPATIENT)
Dept: LAB | Facility: CLINIC | Age: 89
End: 2024-09-02
Payer: COMMERCIAL
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
## 2024-09-02 DIAGNOSIS — N18.9 CHRONIC KIDNEY DISEASE, UNSPECIFIED: ICD-10-CM

## 2024-09-03 LAB
ANION GAP SERPL CALCULATED.3IONS-SCNC: 16 MMOL/L (ref 7–15)
BUN SERPL-MCNC: 39.5 MG/DL (ref 8–23)
CALCIUM SERPL-MCNC: 9.7 MG/DL (ref 8.8–10.4)
CHLORIDE SERPL-SCNC: 98 MMOL/L (ref 98–107)
CREAT SERPL-MCNC: 1.99 MG/DL (ref 0.51–0.95)
EGFRCR SERPLBLD CKD-EPI 2021: 23 ML/MIN/1.73M2
ERYTHROCYTE [DISTWIDTH] IN BLOOD BY AUTOMATED COUNT: 15.8 % (ref 10–15)
GLUCOSE SERPL-MCNC: 139 MG/DL (ref 70–99)
HCO3 SERPL-SCNC: 23 MMOL/L (ref 22–29)
HCT VFR BLD AUTO: 40.2 % (ref 35–47)
HGB BLD-MCNC: 12.1 G/DL (ref 11.7–15.7)
MCH RBC QN AUTO: 25.5 PG (ref 26.5–33)
MCHC RBC AUTO-ENTMCNC: 30.1 G/DL (ref 31.5–36.5)
MCV RBC AUTO: 85 FL (ref 78–100)
PLATELET # BLD AUTO: 404 10E3/UL (ref 150–450)
POTASSIUM SERPL-SCNC: 4.6 MMOL/L (ref 3.4–5.3)
RBC # BLD AUTO: 4.75 10E6/UL (ref 3.8–5.2)
SODIUM SERPL-SCNC: 137 MMOL/L (ref 135–145)
WBC # BLD AUTO: 9.5 10E3/UL (ref 4–11)

## 2024-09-03 PROCEDURE — 82947 ASSAY GLUCOSE BLOOD QUANT: CPT | Performed by: NURSE PRACTITIONER

## 2024-09-03 PROCEDURE — 80048 BASIC METABOLIC PNL TOTAL CA: CPT | Performed by: NURSE PRACTITIONER

## 2024-09-03 PROCEDURE — 85027 COMPLETE CBC AUTOMATED: CPT | Performed by: NURSE PRACTITIONER

## 2024-09-03 PROCEDURE — 82310 ASSAY OF CALCIUM: CPT | Performed by: NURSE PRACTITIONER

## 2024-09-04 RX ORDER — DOXYCYCLINE 100 MG/1
100 CAPSULE ORAL DAILY
Status: SHIPPED
Start: 2024-09-04 | End: 2024-09-18

## 2024-09-04 NOTE — TELEPHONE ENCOUNTER
Photo received from patient's nursing home. Incision site is concerning.    I spoke with Tommy, one of Kristyn's RNs at [facility]. He is aware that patient's incision site is not healing as an anticipated and the photo has been sent to Dr. Baron for review. It is likely he will recommend she start on antibiotics. Tommy asked that any new orders be faxed to [phone] (ATTN Tommy). He also asked that we call [phone] to let them know that the order has been faxed so they know to keep an eye out for it.    Transportation is difficult so they will not be able to get patient to the clinic this week. Earliest appt availability they could make work is on Monday, 9/9 @ 5776. (Dr. Baron is in a case at that time but Dr Ortega is in the clinic and Dr. Pabon is rounding so should hopefully have an MD available to assess).    ADAM RN

## 2024-09-04 NOTE — TELEPHONE ENCOUNTER
Hard to tell but could be superficial. Start doxycycline 100 mg daily for 2 weeks.           Received above message from Dr. Baron.      Attempted to order doxy but an alert popped up d/t listed allergy to Minocycline. Patient is allergic to yellow dye and minocycline has Yellow Dye #10 which is why it is listed as an allergy.  Could not find any information online regarding whether or not doxycycline contains yellow dye.  Will route to our pharmacy pool to see if they have any insight.

## 2024-09-04 NOTE — TELEPHONE ENCOUNTER
Hi Kaylene,  I am finding that some of the doxycyline formulations do have yellow dye in them but not all products. I do not see that the doxycycline hyclate capsules (brand names or generic) have yellow dye in them.    I would also encourage the patient to make sure her pharmacy is aware of the allergy as they would have the listed ingredients of the specific doxycycline product dispensed.    Thanks,  Maddison Orozco, PharmD  Medication Therapy Management Pharmacist  [facility]           Received above response from pharmacist, Maddison Orozco.   Placed an order in Epic for doxy.  Spoke with [facility] and informed them of recommendations and need to check with their pharamacy regarding yellow dye allergy. Order faxed to Bryn.

## 2024-09-05 ENCOUNTER — TRANSITIONAL CARE UNIT VISIT (OUTPATIENT)
Dept: GERIATRICS | Facility: CLINIC | Age: 89
End: 2024-09-05
Payer: COMMERCIAL

## 2024-09-05 VITALS
TEMPERATURE: 98.2 F | HEIGHT: 67 IN | HEART RATE: 70 BPM | WEIGHT: 241 LBS | SYSTOLIC BLOOD PRESSURE: 124 MMHG | OXYGEN SATURATION: 93 % | BODY MASS INDEX: 37.83 KG/M2 | RESPIRATION RATE: 16 BRPM | DIASTOLIC BLOOD PRESSURE: 80 MMHG

## 2024-09-05 DIAGNOSIS — K21.9 GASTROESOPHAGEAL REFLUX DISEASE WITHOUT ESOPHAGITIS: ICD-10-CM

## 2024-09-05 DIAGNOSIS — R53.81 PHYSICAL DECONDITIONING: ICD-10-CM

## 2024-09-05 DIAGNOSIS — R41.89 COGNITIVE IMPAIRMENT: ICD-10-CM

## 2024-09-05 DIAGNOSIS — I50.32 CHRONIC HEART FAILURE WITH PRESERVED EJECTION FRACTION (HFPEF) (H): ICD-10-CM

## 2024-09-05 DIAGNOSIS — E66.812 CLASS 2 SEVERE OBESITY WITH SERIOUS COMORBIDITY AND BODY MASS INDEX (BMI) OF 37.0 TO 37.9 IN ADULT, UNSPECIFIED OBESITY TYPE (H): ICD-10-CM

## 2024-09-05 DIAGNOSIS — E78.5 HYPERLIPIDEMIA LDL GOAL <70: ICD-10-CM

## 2024-09-05 DIAGNOSIS — M15.9 GENERALIZED OSTEOARTHROSIS, INVOLVING MULTIPLE SITES: ICD-10-CM

## 2024-09-05 DIAGNOSIS — F32.A DEPRESSION, UNSPECIFIED DEPRESSION TYPE: ICD-10-CM

## 2024-09-05 DIAGNOSIS — G47.00 INSOMNIA, UNSPECIFIED TYPE: ICD-10-CM

## 2024-09-05 DIAGNOSIS — N20.0 KIDNEY STONE: ICD-10-CM

## 2024-09-05 DIAGNOSIS — E11.9 DIET-CONTROLLED TYPE 2 DIABETES MELLITUS (H): ICD-10-CM

## 2024-09-05 DIAGNOSIS — M25.551 HIP PAIN, RIGHT: Primary | ICD-10-CM

## 2024-09-05 DIAGNOSIS — G47.33 OSA (OBSTRUCTIVE SLEEP APNEA): ICD-10-CM
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
## 2024-09-05 DIAGNOSIS — I48.91 ATRIAL FIBRILLATION, UNSPECIFIED TYPE (H): ICD-10-CM

## 2024-09-05 DIAGNOSIS — I25.10 CORONARY ARTERY DISEASE INVOLVING NATIVE HEART, UNSPECIFIED VESSEL OR LESION TYPE, UNSPECIFIED WHETHER ANGINA PRESENT: ICD-10-CM

## 2024-09-05 DIAGNOSIS — J45.20 INTERMITTENT ASTHMA WITHOUT COMPLICATION, UNSPECIFIED ASTHMA SEVERITY: ICD-10-CM

## 2024-09-05 DIAGNOSIS — I10 ESSENTIAL HYPERTENSION: ICD-10-CM

## 2024-09-05 DIAGNOSIS — N18.4 CKD (CHRONIC KIDNEY DISEASE) STAGE 4, GFR 15-29 ML/MIN (H): ICD-10-CM

## 2024-09-05 DIAGNOSIS — E66.01 CLASS 2 SEVERE OBESITY WITH SERIOUS COMORBIDITY AND BODY MASS INDEX (BMI) OF 37.0 TO 37.9 IN ADULT, UNSPECIFIED OBESITY TYPE (H): ICD-10-CM

## 2024-09-05 PROCEDURE — 99309 SBSQ NF CARE MODERATE MDM 30: CPT | Performed by: NURSE PRACTITIONER

## 2024-09-05 RX ORDER — TRAMADOL HYDROCHLORIDE 50 MG/1
50 TABLET ORAL 2 TIMES DAILY PRN
Qty: 20 TABLET | Refills: 0 | Status: SHIPPED | OUTPATIENT
Start: 2024-09-05 | End: 2024-09-06

## 2024-09-05 RX ORDER — LANOLIN ALCOHOL/MO/W.PET/CERES
3 CREAM (GRAM) TOPICAL AT BEDTIME
Status: SHIPPED
Start: 2024-09-05

## 2024-09-05 NOTE — PATIENT INSTRUCTIONS
Gabrielle Andre  9/24/1933  1) BMP 9/10. Diagnosis: CKD  2) Please add appointment details to PCC for appointment on 9/9  3) Ok to pull doxy from [employer] today if needed.  4)  Discontinue scheduled tramadol and Continue tramadol 50 mg BID PRN for pain  5) Start melatonin 3 mg at bedtime for insomnia  6) OK for patient to use home supply and self administer ultra strength bengay cream for pain  7) Discontinue hydroxyzine  YOHANNES Izquierdo CNP on 9/5/2024 at 9:58 AM

## 2024-09-05 NOTE — LETTER
9/5/2024      Moira Andre  [address]  [place] [address]        [facility] GERIATRICS    Chief Complaint   Patient presents with     RECHECK     HPI:  Moira Andre is a 89 year old  (9/24/1933), who is being seen today for an episodic care visit at: [facility]  ([facility]) [[id number]].     Past medical history significant for HFpEF, CAD, hypertension, dyslipidemia, atrial fibrillation s/p AVN with PPM, aortic stenosis, mitral stenosis, type 2 diabetes, TIA, asthma, CKD, depression, GERD, cognitive impairment, ELIGIO     Patient was recently hospitalized at [facility] for UTI from 8/30/2024 to 8/7/2024, discharged to U following hospital stay, discharged home on 8/19/2024     Summary of recent hospitalization:   Patient was hospitalized at [facility] from 8/23/2024 to 8/28/2024 for generalized weakness and fall.  Patient presented to the emergency department for evaluation after her legs gave out and she fell at home.  In the emergency department lab work significant for creatinine 2.08, WBC 11.0, TSH 2.01,  UA abnormal, COVID-19 negative.  CT pelvis negative for acute fracture.  CT lumbar spine negative for fracture, multilevel high-grade spinal canal foraminal stenosis, right kidney stone measuring 9 mm, moderate surrounding chronic fat stranding may be secondary to chronic urinary infection.  CT cervical spine and head CT negative for acute findings.  Patient was treated for possible UTI with ceftriaxone, urine culture came back as no growth so this was discontinued on 8/25.  Recommend follow-up with urology outpatient.  Orthopedics was consulted for right hip pain.  Patient underwent right hip MRI that showed no acute fracture, advanced degenerative changes of right hip joint with moderate effusion.  Her pain medication was adjusted.  She did have some encephalopathy, suspected to be due to oxycodone, medication was changed to tramadol and cognition improved.  " Patient also received fluids for elevated creatinine, which improved prior to discharge. Discharged to TCU for physical rehabilitation and medical management.     Today patient was seen for routine follow-up in the TCU.  Per discussion with patient and nursing staff, pain has been managed and has not been experiencing pain.  She does report noting some muscle aches on her right hip and thigh area only with ambulation.  Patient was started on doxycycline once daily by cardiology, as patient's pacemaker site is not healing as anticipated.  Patient denies any pain to the site today.  She denies shortness of breath, chest pain, dizziness/lightheadedness, dysuria/trouble urinating.  She tells me her bowels are moving regularly.  She is interested in trying something to help her sleep at night, is agreeable to trial of melatonin.  She was taking a product that has Benadryl in it at home, discussed risks and potential side effects with Benadryl but are not recommended in older adults.  Patient continues to work with therapy.    Reviewed facility EMR including medications, recent nursing progress notes, vital signs.  Discussed plan of care with nursing.    Allergies, and PMH/PSH reviewed in EPIC today.  REVIEW OF SYSTEMS:  8 point ROS of systems including Constitutional,  Respiratory, Cardiovascular, Gastroenterology, Genitourinary, Integumentary, Musculoskeletal, Psychiatric were all negative except for pertinent positives noted in my HPI.    Objective:   /80   Pulse 70   Temp 98.2  F (36.8  C)   Resp 16   Ht 1.702 m (5' 7\")   Wt 109.3 kg (241 lb)   SpO2 93%   BMI 37.75 kg/m    GENERAL APPEARANCE:  Alert, in NAD  HEENT: normocephalic, moist mucous membranes, nose without drainage or crusting  RESP:  respiratory effort normal, no respiratory distress, Lung sounds clear, patient is on RA  CV: auscultation of heart done, rate and rhythm regular.   ABDOMEN: + bowel sounds, soft, nontender, no grimacing or " guarding with palpation.  M/S:   generalized lower extremity edema  SKIN: pacemaker site with some erythema at incision and several scabs, no dehiscence noted  NEURO: moves extremities freely  PSYCH: affect and mood normal    Labs done in SNF are in [facility]. Please refer to them using EPIC/Care Everywhere. and Recent labs in [facility] reviewed by me today.     Assessment/Plan:  Right hip and leg pain  Fall, subsequent encounter  Generalized oseteoarthrosis  Denies pain, overall per nursing pain has been managed ok, does have some muscular pain with activity.  Plan: Discontinue scheduled tramadol and hydroxyzine. Continue tramadol 50 mg BID PRN. Continue pain management with Tylenol 1000 mg 3 times daily, methocarbamol 75 mg 4 times daily. Ok for patient to use bengay at bedside PRN.  Monitor pain.  Weightbearing as tolerated.  Therapy as below.      Acute metabolic encephalopathy, resolved  Cognitive impairment  SLUMS 16/30 and CPT 4.9/5.6 during TCU stay in August  Plan: OCCUPATIONAL THERAPY to complete cognitive testing for safe discharge planning. Nursing to assist with cares, meals, medication assistance, activities.     History of recent E. coli UTI  9 mm right kidney stone on CT lumbar spine that also shows fat stranding and concern for chronic UTI  Received ceftriaxone initially on admission that was discontinued 8/25/2024  Urine culture grew urogenital tyler  Denies dysuria/trouble urinating  Plan: Monitor for symptoms. Follow up with urology outpatient.      Chronic HFpEF, LVEF 55-60% per ECHO on 4/19/2024  CAD with history of stents placement  Essential hypertension  Aortic stenosis  Mitral stenosis   Dyslipidemia  Patient is not on aspirin likely due to being on apixaban  Weight 241.4 lb on 9/4, generalized leg swelling, SBP mostly 120-130s  Plan: Continue torsemide 20 mg twice daily, metoprolol 75 mg twice daily continue atorvastatin 20 mg at bedtime and apixaban 2.5 mg twice daily.  Continue  nitroglycerin as needed.  Monitor blood pressure. Daily weights. Notify provider with weight gain >2 lbs in a day, >5 lbs in on week.  Heart healthy diet.     Atrial fibrillation s/p AVN with recent PPM  Slow healing pacemaker site  Started on doxycyline on 9/5  HR 70s  Plan: Continue doxycylcine 100 mg daily x14 days. Continue apixaban 2.5 mg twice daily, metoprolol 75 mg twice daily.  Monitor heart rate. Follow up with cardiology clinic as scheduled.     Diet controlled Type 2 diabetes  Hemoglobin A1c 7.2% on 6/27/2024  Plan: Change blood sugar checks to daily as needed.  Follow-up with PCP outpatient.     Asthma  Respiratory status remained stable  Plan: Continue fluticasone-salmeterol 1 puff every 12 hours, albuterol HFA 2 puffs every 6 hours as needed.  Monitor respiratory status.     CKD stage 4  Baseline creatinine 1.8-2  Creatinine 1.99 on 9/3/2024- at baseline  Plan: [facility] 9/10. Avoid nephrotoxins. Renally dose medications as indicated.     Depression  Estimated Creatinine Clearance: 23.9 mL/min (A) (based on SCr of 1.99 mg/dL (H)).  Plan: Per pharmacy recommendations, patient has been on duloxetine long-term, no current noted side effects.  Okay to continue duloxetine 60 mg daily and bupropion  mg daily.  Consider dose reduction of duloxetine due to increased risk of side effects with CKD at future visits- adjusting tramadol today.  Monitor mood and symptoms.  Consider ACP referral as needed.     GERD   Plan: Continue pantoprazole 20 mg daily.  Monitor symptoms.     Bilateral abdominal fold candidiasis  Plan: Continue miconazole twice daily as needed.  Monitor skin.     Insomnia  Plan: Start melatonin 3 mg at bedtime. Monitor sleep    Slow transit constipation  Bowels are moving regularly  Plan: Continue senna S to 1 tab twice daily and 1 tab twice daily as needed.  Monitor bowels.     ELIGIO  Obesity, BMI 37.75  Complicates care  Plan: Encourage weight loss. Dietician follows in the TCU.      Physical  deconditioning  Secondary to recent hospitalization, acute/ chronic medical conditions as above  Patient continues to work with therapy  Plan: Encourage participation in physical therapy/occupational therapy for strengthening and deconditioning. Discharge planning per their recommendation. Social work to assist with d/c planning.    MED REC REQUIRED  Post Medication Reconciliation Status:  Medication reconciliation previously completed during another office visit      Disclaimer: This note may contain text created using speech-recognition software and may contain unintended word substitutions.    Electronically signed by: YOHANNES Izquierdo CNP         Sincerely,        YOHANNES Izquierdo CNP

## 2024-09-05 NOTE — PROGRESS NOTES
[facility] GERIATRICS    Chief Complaint   Patient presents with    RECHECK     HPI:  oMira Andre is a 89 year old  (9/24/1933), who is being seen today for an episodic care visit at: [facility]  ([facility]) [[id number]].     Past medical history significant for HFpEF, CAD, hypertension, dyslipidemia, atrial fibrillation s/p AVN with PPM, aortic stenosis, mitral stenosis, type 2 diabetes, TIA, asthma, CKD, depression, GERD, cognitive impairment, ELIGIO     Patient was recently hospitalized at [facility] for UTI from 8/30/2024 to 8/7/2024, discharged to U following hospital stay, discharged home on 8/19/2024     Summary of recent hospitalization:   Patient was hospitalized at [facility] from 8/23/2024 to 8/28/2024 for generalized weakness and fall.  Patient presented to the emergency department for evaluation after her legs gave out and she fell at home.  In the emergency department lab work significant for creatinine 2.08, WBC 11.0, TSH 2.01,  UA abnormal, COVID-19 negative.  CT pelvis negative for acute fracture.  CT lumbar spine negative for fracture, multilevel high-grade spinal canal foraminal stenosis, right kidney stone measuring 9 mm, moderate surrounding chronic fat stranding may be secondary to chronic urinary infection.  CT cervical spine and head CT negative for acute findings.  Patient was treated for possible UTI with ceftriaxone, urine culture came back as no growth so this was discontinued on 8/25.  Recommend follow-up with urology outpatient.  Orthopedics was consulted for right hip pain.  Patient underwent right hip MRI that showed no acute fracture, advanced degenerative changes of right hip joint with moderate effusion.  Her pain medication was adjusted.  She did have some encephalopathy, suspected to be due to oxycodone, medication was changed to tramadol and cognition improved.  Patient also received fluids for elevated creatinine, which improved prior to  "discharge. Discharged to TCU for physical rehabilitation and medical management.     Today patient was seen for routine follow-up in the TCU.  Per discussion with patient and nursing staff, pain has been managed and has not been experiencing pain.  She does report noting some muscle aches on her right hip and thigh area only with ambulation.  Patient was started on doxycycline once daily by cardiology, as patient's pacemaker site is not healing as anticipated.  Patient denies any pain to the site today.  She denies shortness of breath, chest pain, dizziness/lightheadedness, dysuria/trouble urinating.  She tells me her bowels are moving regularly.  She is interested in trying something to help her sleep at night, is agreeable to trial of melatonin.  She was taking a product that has Benadryl in it at home, discussed risks and potential side effects with Benadryl but are not recommended in older adults.  Patient continues to work with therapy.    Reviewed facility EMR including medications, recent nursing progress notes, vital signs.  Discussed plan of care with nursing.    Allergies, and PMH/PSH reviewed in EPIC today.  REVIEW OF SYSTEMS:  8 point ROS of systems including Constitutional,  Respiratory, Cardiovascular, Gastroenterology, Genitourinary, Integumentary, Musculoskeletal, Psychiatric were all negative except for pertinent positives noted in my HPI.    Objective:   /80   Pulse 70   Temp 98.2  F (36.8  C)   Resp 16   Ht 1.702 m (5' 7\")   Wt 109.3 kg (241 lb)   SpO2 93%   BMI 37.75 kg/m    GENERAL APPEARANCE:  Alert, in NAD  HEENT: normocephalic, moist mucous membranes, nose without drainage or crusting  RESP:  respiratory effort normal, no respiratory distress, Lung sounds clear, patient is on RA  CV: auscultation of heart done, rate and rhythm regular.   ABDOMEN: + bowel sounds, soft, nontender, no grimacing or guarding with palpation.  M/S:   generalized lower extremity edema  SKIN: pacemaker " site with some erythema at incision and several scabs, no dehiscence noted  NEURO: moves extremities freely  PSYCH: affect and mood normal    Labs done in SNF are in [place] [facility]. Please refer to them using EPIC/Care Everywhere. and Recent labs in EPIC reviewed by me today.     Assessment/Plan:  Right hip and leg pain  Fall, subsequent encounter  Generalized oseteoarthrosis  Denies pain, overall per nursing pain has been managed ok, does have some muscular pain with activity.  Plan: Discontinue scheduled tramadol and hydroxyzine. Continue tramadol 50 mg BID PRN. Continue pain management with Tylenol 1000 mg 3 times daily, methocarbamol 75 mg 4 times daily. Ok for patient to use bengay at bedside PRN.  Monitor pain.  Weightbearing as tolerated.  Therapy as below.      Acute metabolic encephalopathy, resolved  Cognitive impairment  SLUMS 16/30 and CPT 4.9/5.6 during TCU stay in August  Plan: OCCUPATIONAL THERAPY to complete cognitive testing for safe discharge planning. Nursing to assist with cares, meals, medication assistance, activities.     History of recent E. coli UTI  9 mm right kidney stone on CT lumbar spine that also shows fat stranding and concern for chronic UTI  Received ceftriaxone initially on admission that was discontinued 8/25/2024  Urine culture grew urogenital tyler  Denies dysuria/trouble urinating  Plan: Monitor for symptoms. Follow up with urology outpatient.      Chronic HFpEF, LVEF 55-60% per ECHO on 4/19/2024  CAD with history of stents placement  Essential hypertension  Aortic stenosis  Mitral stenosis   Dyslipidemia  Patient is not on aspirin likely due to being on apixaban  Weight 241.4 lb on 9/4, generalized leg swelling, SBP mostly 120-130s  Plan: Continue torsemide 20 mg twice daily, metoprolol 75 mg twice daily continue atorvastatin 20 mg at bedtime and apixaban 2.5 mg twice daily.  Continue nitroglycerin as needed.  Monitor blood pressure. Daily weights. Notify provider with weight  gain >2 lbs in a day, >5 lbs in on week.  Heart healthy diet.     Atrial fibrillation s/p AVN with recent PPM  Slow healing pacemaker site  Started on doxycyline on 9/5  HR 70s  Plan: Continue doxycylcine 100 mg daily x14 days. Continue apixaban 2.5 mg twice daily, metoprolol 75 mg twice daily.  Monitor heart rate. Follow up with cardiology clinic as scheduled.     Diet controlled Type 2 diabetes  Hemoglobin A1c 7.2% on 6/27/2024  Plan: Change blood sugar checks to daily as needed.  Follow-up with PCP outpatient.     Asthma  Respiratory status remained stable  Plan: Continue fluticasone-salmeterol 1 puff every 12 hours, albuterol HFA 2 puffs every 6 hours as needed.  Monitor respiratory status.     CKD stage 4  Baseline creatinine 1.8-2  Creatinine 1.99 on 9/3/2024- at baseline  Plan: [facility] 9/10. Avoid nephrotoxins. Renally dose medications as indicated.     Depression  Estimated Creatinine Clearance: 23.9 mL/min (A) (based on SCr of 1.99 mg/dL (H)).  Plan: Per pharmacy recommendations, patient has been on duloxetine long-term, no current noted side effects.  Okay to continue duloxetine 60 mg daily and bupropion  mg daily.  Consider dose reduction of duloxetine due to increased risk of side effects with CKD at future visits- adjusting tramadol today.  Monitor mood and symptoms.  Consider ACP referral as needed.     GERD   Plan: Continue pantoprazole 20 mg daily.  Monitor symptoms.     Bilateral abdominal fold candidiasis  Plan: Continue miconazole twice daily as needed.  Monitor skin.     Insomnia  Plan: Start melatonin 3 mg at bedtime. Monitor sleep    Slow transit constipation  Bowels are moving regularly  Plan: Continue senna S to 1 tab twice daily and 1 tab twice daily as needed.  Monitor bowels.     ELIGIO  Obesity, BMI 37.75  Complicates care  Plan: Encourage weight loss. Dietician follows in the TCU.      Physical deconditioning  Secondary to recent hospitalization, acute/ chronic medical conditions as  above  Patient continues to work with therapy  Plan: Encourage participation in physical therapy/occupational therapy for strengthening and deconditioning. Discharge planning per their recommendation. Social work to assist with d/c planning.    MED REC REQUIRED  Post Medication Reconciliation Status:  Medication reconciliation previously completed during another office visit      Disclaimer: This note may contain text created using speech-recognition software and may contain unintended word substitutions.    Electronically signed by: YOHANNES Izquierdo CNP

## 2024-09-06 DIAGNOSIS — M25.551 HIP PAIN, RIGHT: ICD-10-CM

## 2024-09-06 RX ORDER — TRAMADOL HYDROCHLORIDE 50 MG/1
50 TABLET ORAL 2 TIMES DAILY PRN
Qty: 20 TABLET | Refills: 0 | Status: SHIPPED | OUTPATIENT
Start: 2024-09-06

## 2024-09-09 ENCOUNTER — LAB REQUISITION (OUTPATIENT)
Dept: LAB | Facility: CLINIC | Age: 89
End: 2024-09-09
Payer: COMMERCIAL

## 2024-09-09 ENCOUNTER — ANCILLARY PROCEDURE (OUTPATIENT)
Dept: CARDIOLOGY | Facility: CLINIC | Age: 89
End: 2024-09-09
Attending: INTERNAL MEDICINE
Payer: COMMERCIAL

## 2024-09-09 ENCOUNTER — PATIENT OUTREACH (OUTPATIENT)
Dept: CARE COORDINATION | Facility: CLINIC | Age: 89
End: 2024-09-09

## 2024-09-09 DIAGNOSIS — Z95.0 CARDIAC PACEMAKER IN SITU: ICD-10-CM

## 2024-09-09 DIAGNOSIS — Z98.890 HX OF ATRIOVENTRICULAR NODE ABLATION: ICD-10-CM

## 2024-09-09 DIAGNOSIS — N18.9 CHRONIC KIDNEY DISEASE, UNSPECIFIED: ICD-10-CM

## 2024-09-09 RX ORDER — CEPHALEXIN 500 MG/1
500 CAPSULE ORAL 2 TIMES DAILY
Qty: 14 CAPSULE | Refills: 0 | Status: SHIPPED | OUTPATIENT
Start: 2024-09-09 | End: 2024-09-16

## 2024-09-09 NOTE — PROGRESS NOTES
Msg left.   Patient will need to get paperwork completed by treating provider. Previous neurologist or who has been or saw last for blood patch.    Occupational Therapy: Orders received. Chart reviewed and discussed with care team.? Pt admitted under observation status due to a fall. Per chart and spoke with sw, pt is discharging to TCU today. Will defer OT to TCU and complete inpt OT orders.

## 2024-09-09 NOTE — TELEPHONE ENCOUNTER
Pt here for courtesy check of [facility] incision site. 3 areas of concern shown below . Pictures taken after hard crusty areas removed with alchohol swab. Small amt of pussy drainage from middle and bottom open areas of incision.Dr Ortega here to observe . Keflex ordered and sent to pharmacy.   Guaze dressing applied. Pt instructed to change daily and notify us if increase in drainage redness or warmth noted.   Instructions for Iggy filled out and sent with pt.   Left VM with daughter with plan of Antibiotic and dressing changes. Also asked if daughter could send us an updated picture in 7-10 days to assess incision. Device number provided.Awaiting return call to arrange this.  Bijal BLUE

## 2024-09-10 ENCOUNTER — TRANSITIONAL CARE UNIT VISIT (OUTPATIENT)
Dept: GERIATRICS | Facility: CLINIC | Age: 89
End: 2024-09-10
Payer: COMMERCIAL

## 2024-09-10 VITALS
SYSTOLIC BLOOD PRESSURE: 126 MMHG | OXYGEN SATURATION: 97 % | TEMPERATURE: 97.8 F | WEIGHT: 234 LBS | BODY MASS INDEX: 36.73 KG/M2 | HEART RATE: 70 BPM | RESPIRATION RATE: 17 BRPM | DIASTOLIC BLOOD PRESSURE: 80 MMHG | HEIGHT: 67 IN

## 2024-09-10 DIAGNOSIS — N20.0 KIDNEY STONE: ICD-10-CM

## 2024-09-10 DIAGNOSIS — G47.00 INSOMNIA, UNSPECIFIED TYPE: ICD-10-CM

## 2024-09-10 DIAGNOSIS — E66.01 CLASS 2 SEVERE OBESITY WITH SERIOUS COMORBIDITY AND BODY MASS INDEX (BMI) OF 37.0 TO 37.9 IN ADULT, UNSPECIFIED OBESITY TYPE (H): ICD-10-CM

## 2024-09-10 DIAGNOSIS — F32.A DEPRESSION, UNSPECIFIED DEPRESSION TYPE: ICD-10-CM

## 2024-09-10 DIAGNOSIS — E78.5 HYPERLIPIDEMIA LDL GOAL <70: ICD-10-CM

## 2024-09-10 DIAGNOSIS — R53.81 PHYSICAL DECONDITIONING: ICD-10-CM

## 2024-09-10 DIAGNOSIS — I50.32 CHRONIC HEART FAILURE WITH PRESERVED EJECTION FRACTION (HFPEF) (H): ICD-10-CM

## 2024-09-10 DIAGNOSIS — G47.33 OSA (OBSTRUCTIVE SLEEP APNEA): ICD-10-CM

## 2024-09-10 DIAGNOSIS — N18.4 CKD (CHRONIC KIDNEY DISEASE) STAGE 4, GFR 15-29 ML/MIN (H): ICD-10-CM

## 2024-09-10 DIAGNOSIS — E11.9 DIET-CONTROLLED TYPE 2 DIABETES MELLITUS (H): ICD-10-CM

## 2024-09-10 DIAGNOSIS — I25.10 CORONARY ARTERY DISEASE INVOLVING NATIVE HEART, UNSPECIFIED VESSEL OR LESION TYPE, UNSPECIFIED WHETHER ANGINA PRESENT: ICD-10-CM

## 2024-09-10 DIAGNOSIS — R41.89 COGNITIVE IMPAIRMENT: ICD-10-CM

## 2024-09-10 DIAGNOSIS — K21.9 GASTROESOPHAGEAL REFLUX DISEASE WITHOUT ESOPHAGITIS: ICD-10-CM

## 2024-09-10 DIAGNOSIS — I48.91 ATRIAL FIBRILLATION, UNSPECIFIED TYPE (H): ICD-10-CM

## 2024-09-10 DIAGNOSIS — E66.812 CLASS 2 SEVERE OBESITY WITH SERIOUS COMORBIDITY AND BODY MASS INDEX (BMI) OF 37.0 TO 37.9 IN ADULT, UNSPECIFIED OBESITY TYPE (H): ICD-10-CM

## 2024-09-10 DIAGNOSIS — I10 ESSENTIAL HYPERTENSION: ICD-10-CM

## 2024-09-10 DIAGNOSIS — J45.20 INTERMITTENT ASTHMA WITHOUT COMPLICATION, UNSPECIFIED ASTHMA SEVERITY: ICD-10-CM

## 2024-09-10 DIAGNOSIS — M15.9 GENERALIZED OSTEOARTHROSIS, INVOLVING MULTIPLE SITES: ICD-10-CM
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
## 2024-09-10 DIAGNOSIS — M25.551 HIP PAIN, RIGHT: Primary | ICD-10-CM

## 2024-09-10 LAB
ANION GAP SERPL CALCULATED.3IONS-SCNC: 17 MMOL/L (ref 7–15)
BUN SERPL-MCNC: 38.4 MG/DL (ref 8–23)
CALCIUM SERPL-MCNC: 9.7 MG/DL (ref 8.8–10.4)
CHLORIDE SERPL-SCNC: 98 MMOL/L (ref 98–107)
CREAT SERPL-MCNC: 2.09 MG/DL (ref 0.51–0.95)
EGFRCR SERPLBLD CKD-EPI 2021: 22 ML/MIN/1.73M2
GLUCOSE SERPL-MCNC: 138 MG/DL (ref 70–99)
HCO3 SERPL-SCNC: 24 MMOL/L (ref 22–29)
POTASSIUM SERPL-SCNC: 4.3 MMOL/L (ref 3.4–5.3)
SODIUM SERPL-SCNC: 139 MMOL/L (ref 135–145)

## 2024-09-10 PROCEDURE — 82947 ASSAY GLUCOSE BLOOD QUANT: CPT | Performed by: NURSE PRACTITIONER

## 2024-09-10 PROCEDURE — P9604 ONE-WAY ALLOW PRORATED TRIP: HCPCS | Performed by: NURSE PRACTITIONER

## 2024-09-10 PROCEDURE — 99309 SBSQ NF CARE MODERATE MDM 30: CPT | Performed by: NURSE PRACTITIONER

## 2024-09-10 PROCEDURE — 36415 COLL VENOUS BLD VENIPUNCTURE: CPT | Performed by: NURSE PRACTITIONER

## 2024-09-10 PROCEDURE — 82310 ASSAY OF CALCIUM: CPT | Performed by: NURSE PRACTITIONER

## 2024-09-10 PROCEDURE — 80048 BASIC METABOLIC PNL TOTAL CA: CPT | Performed by: NURSE PRACTITIONER

## 2024-09-10 RX ORDER — METHOCARBAMOL 750 MG/1
750 TABLET, FILM COATED ORAL 4 TIMES DAILY PRN
Status: SHIPPED
Start: 2024-09-10

## 2024-09-10 NOTE — PATIENT INSTRUCTIONS
Gabrielle Andre  9/24/1933  1) Change methocarbamol 750 mg q6h PRN. For pain  Ilsa Marquez, YOHANNES CNP on 9/10/2024 at 9:09 AM

## 2024-09-10 NOTE — LETTER
9/10/2024      Moira Andre  [address]  [place] [id number]        [facility] GERIATRICS    Chief Complaint   Patient presents with     Nursing Home Acute     HPI:  Moira Andre is a 89 year old  (9/24/1933), who is being seen today for an episodic care visit at: [facility]  ([facility]) [[id number]].     Past medical history significant for HFpEF, CAD, hypertension, dyslipidemia, atrial fibrillation s/p AVN with PPM, aortic stenosis, mitral stenosis, type 2 diabetes, TIA, asthma, CKD, depression, GERD, cognitive impairment, ELIGIO     Patient was recently hospitalized at [facility] for UTI from 8/30/2024 to 8/7/2024, discharged to U following hospital stay, discharged home on 8/19/2024     Summary of recent hospitalization:   Patient was hospitalized at [facility] from 8/23/2024 to 8/28/2024 for generalized weakness and fall.  Patient presented to the emergency department for evaluation after her legs gave out and she fell at home.  In the emergency department lab work significant for creatinine 2.08, WBC 11.0, TSH 2.01,  UA abnormal, COVID-19 negative.  CT pelvis negative for acute fracture.  CT lumbar spine negative for fracture, multilevel high-grade spinal canal foraminal stenosis, right kidney stone measuring 9 mm, moderate surrounding chronic fat stranding may be secondary to chronic urinary infection.  CT cervical spine and head CT negative for acute findings.  Patient was treated for possible UTI with ceftriaxone, urine culture came back as no growth so this was discontinued on 8/25.  Recommend follow-up with urology outpatient.  Orthopedics was consulted for right hip pain.  Patient underwent right hip MRI that showed no acute fracture, advanced degenerative changes of right hip joint with moderate effusion.  Her pain medication was adjusted.  She did have some encephalopathy, suspected to be due to oxycodone, medication was changed to tramadol and  "cognition improved.  Patient also received fluids for elevated creatinine, which improved prior to discharge. Discharged to TCU for physical rehabilitation and medical management.     Today patient was seen for routine follow-up in the TCU.  Patient had follow-up with cardiology yesterday, Keflex was added on for incisional infection.  This morning she reports some right knee pain, she just returned from session with therapy.  Otherwise she tells me she has not been experiencing any pain.  She has not used any tramadol since it was changed to as needed last week.  She denies any shortness of breath, chest pain, dizziness/lightheadedness, dysuria/trouble urinating.  She reports that her bowels are moving regularly.  Patient continues to work with therapy.  Per discussion with  patient will be moving to [facility],  is assisting with this, no discharge plan at this time, last covered day set for 9/11.    Reviewed facility EMR including medications, recent nursing progress notes, vital signs.  Discussed plan of care with nursing.    Allergies, and PMH/PSH reviewed in EPIC today.  REVIEW OF SYSTEMS:  7 point ROS of systems including Constitutional, Respiratory, Cardiovascular, Gastroenterology, Genitourinary, Integumentary, Musculoskeletal were all negative except for pertinent positives noted in my HPI.    Objective:   /80   Pulse 70   Temp 97.8  F (36.6  C)   Resp 17   Ht 1.702 m (5' 7\")   Wt 106.1 kg (234 lb)   SpO2 97%   BMI 36.65 kg/m    GENERAL APPEARANCE:  Alert, in NAD  HEENT: normocephalic, moist mucous membranes, nose without drainage or crusting  RESP:  respiratory effort normal, no respiratory distress, Lung sounds clear, patient is on RA  CV: auscultation of heart done, rate and rhythm regular.    ABDOMEN: + bowel sounds, soft, nontender, no grimacing or guarding with palpation.  M/S:   generalized bilateral lower extremity edema  SKIN:  reviewed images of pacemaker site " from 9/9 in EPIC  NEURO: moves extremities freely  PSYCH:  affect and mood normal      Labs done in SNF are in [place] [facility]. Please refer to them using EPIC/Care Everywhere. and Recent labs in [facility] reviewed by me today.     Assessment/Plan:  Right hip and leg pain  Fall, subsequent encounter  Generalized oseteoarthrosis  Pain has been managed, tramadol changed to PRN last week  Reports some knee pain after returning from therapy today, but tells me overall she hasn't been experiencing pain- has not used tramadol since changed to RPN  Plan: Continue tramadol 50 mg BID PRN, Tylenol 1000 mg 3 times daily. Change methocarbamol 750 mg q6h PRN. Ok for patient to use bengay at bedside PRN.  Monitor pain.  Weightbearing as tolerated.  Therapy as below.      Acute metabolic encephalopathy, resolved  Cognitive impairment  SLUMS 16/30 and CPT 4.9/5.6 during TCU stay in August  Plan: OCCUPATIONAL THERAPY to complete cognitive testing for safe discharge planning. Nursing to assist with cares, meals, medication assistance, activities.     History of recent E. coli UTI  9 mm right kidney stone on CT lumbar spine that also shows fat stranding and concern for chronic UTI  Received ceftriaxone initially on admission that was discontinued 8/25/2024  Urine culture grew urogenital tyler  Denies dysuria/trouble urinating  Plan: Monitor for symptoms. Follow up with urology outpatient.      Chronic HFpEF, LVEF 55-60% per ECHO on 4/19/2024  CAD with history of stents placement  Essential hypertension  Aortic stenosis  Mitral stenosis   Dyslipidemia  Patient is not on aspirin likely due to being on apixaban  Weight 234.8 lb on 9/10, generalized leg swelling today, SBP mostly 110-130s  Plan: Continue torsemide 20 mg twice daily, metoprolol 75 mg twice daily continue atorvastatin 20 mg at bedtime and apixaban 2.5 mg twice daily.  Continue nitroglycerin as needed.  Monitor blood pressure. Daily weights. Notify provider with weight gain >2 lbs  in a day, >5 lbs in on week.  Heart healthy diet.     Atrial fibrillation s/p AVN with recent PPM  Slow healing pacemaker site  Started on doxycyline on 9/5 for pacemaker incision, added keflex on 9/9 by cardiology   HR 70  Plan: Continue doxycylcine 100 mg daily x14 days through 9/19 and keflex 500 mg BID x7 days through 9/16/2024. Continue apixaban 2.5 mg twice daily, metoprolol 75 mg twice daily.  Monitor heart rate. Follow up with cardiology clinic as scheduled.     Diet controlled Type 2 diabetes  Hemoglobin A1c 7.2% on 6/27/2024  Plan: Blood sugar checks to daily as needed.  Follow-up with PCP outpatient.     Asthma  Respiratory status is stable  Plan: Continue fluticasone-salmeterol 1 puff every 12 hours, albuterol HFA 2 puffs every 6 hours as needed.  Monitor respiratory status.     CKD stage 4  Baseline creatinine 1.8-2  Creatinine 1.99 on 9/3/2024- at baseline  Plan: BMP pending today. Avoid nephrotoxins. Renally dose medications as indicated.     Depression  Estimated Creatinine Clearance: 23.6 mL/min (A) (based on SCr of 1.99 mg/dL (H)).  Plan: Per pharmacy recommendations, patient has been on duloxetine long-term, no current noted side effects.  Okay to continue duloxetine 60 mg daily and bupropion  mg daily.  Consider dose reduction of duloxetine due to increased risk of side effects with CKD once patient is settled in new living situation to reduce triggers for depression.  Monitor mood and symptoms.  Consider ACP referral as needed.     GERD   Plan: Continue pantoprazole 20 mg daily.  Monitor symptoms.     Bilateral abdominal fold candidiasis  Plan: Continue miconazole twice daily as needed.  Monitor skin.     Insomnia  Started on melatonin in the TCU  Plan: Continue melatonin 3 mg at bedtime. Monitor sleep     Slow transit constipation  Bowels are moving regularly  Plan: Continue senna S to 1 tab twice daily and 1 tab twice daily as needed.  Monitor bowels.     ELIGIO  Obesity, BMI  36.65  Complicates care  Plan: Encourage weight loss. Dietician follows in the TCU.      Physical deconditioning  Secondary to recent hospitalization, acute/ chronic medical conditions as above  Patient continues to work with therapy, LCD set for 9/11, patient will need to move to [facility]  Plan: Encourage participation in physical therapy/occupational therapy for strengthening and deconditioning. Discharge planning per their recommendation. Social work to assist with d/c planning.       MED REC REQUIRED  Post Medication Reconciliation Status:  Medication reconciliation previously completed during another office visit    Disclaimer: This note may contain text created using speech-recognition software and may contain unintended word substitutions.      Electronically signed by: YOHANNES Izquierdo CNP         Sincerely,        YOHANNES Izquierdo CNP

## 2024-09-10 NOTE — PROGRESS NOTES
[facility] GERIATRICS    Chief Complaint   Patient presents with    Nursing Home Acute     HPI:  Moira Andre is a 89 year old  (9/24/1933), who is being seen today for an episodic care visit at: [facility]  ([facility]) [[id number]].     Past medical history significant for HFpEF, CAD, hypertension, dyslipidemia, atrial fibrillation s/p AVN with PPM, aortic stenosis, mitral stenosis, type 2 diabetes, TIA, asthma, CKD, depression, GERD, cognitive impairment, ELIGIO     Patient was recently hospitalized at [facility] for UTI from 8/30/2024 to 8/7/2024, discharged to U following hospital stay, discharged home on 8/19/2024     Summary of recent hospitalization:   Patient was hospitalized at [facility] from 8/23/2024 to 8/28/2024 for generalized weakness and fall.  Patient presented to the emergency department for evaluation after her legs gave out and she fell at home.  In the emergency department lab work significant for creatinine 2.08, WBC 11.0, TSH 2.01,  UA abnormal, COVID-19 negative.  CT pelvis negative for acute fracture.  CT lumbar spine negative for fracture, multilevel high-grade spinal canal foraminal stenosis, right kidney stone measuring 9 mm, moderate surrounding chronic fat stranding may be secondary to chronic urinary infection.  CT cervical spine and head CT negative for acute findings.  Patient was treated for possible UTI with ceftriaxone, urine culture came back as no growth so this was discontinued on 8/25.  Recommend follow-up with urology outpatient.  Orthopedics was consulted for right hip pain.  Patient underwent right hip MRI that showed no acute fracture, advanced degenerative changes of right hip joint with moderate effusion.  Her pain medication was adjusted.  She did have some encephalopathy, suspected to be due to oxycodone, medication was changed to tramadol and cognition improved.  Patient also received fluids for elevated creatinine, which improved prior  "to discharge. Discharged to TCU for physical rehabilitation and medical management.     Today patient was seen for routine follow-up in the TCU.  Patient had follow-up with cardiology yesterday, Keflex was added on for incisional infection.  This morning she reports some right knee pain, she just returned from session with therapy.  Otherwise she tells me she has not been experiencing any pain.  She has not used any tramadol since it was changed to as needed last week.  She denies any shortness of breath, chest pain, dizziness/lightheadedness, dysuria/trouble urinating.  She reports that her bowels are moving regularly.  Patient continues to work with therapy.  Per discussion with  patient will be moving to [facility],  is assisting with this, no discharge plan at this time, last covered day set for 9/11.    Reviewed facility EMR including medications, recent nursing progress notes, vital signs.  Discussed plan of care with nursing.    Allergies, and PMH/PSH reviewed in [facility] today.  REVIEW OF SYSTEMS:  7 point ROS of systems including Constitutional, Respiratory, Cardiovascular, Gastroenterology, Genitourinary, Integumentary, Musculoskeletal were all negative except for pertinent positives noted in my HPI.    Objective:   /80   Pulse 70   Temp 97.8  F (36.6  C)   Resp 17   Ht 1.702 m (5' 7\")   Wt 106.1 kg (234 lb)   SpO2 97%   BMI 36.65 kg/m    GENERAL APPEARANCE:  Alert, in NAD  HEENT: normocephalic, moist mucous membranes, nose without drainage or crusting  RESP:  respiratory effort normal, no respiratory distress, Lung sounds clear, patient is on RA  CV: auscultation of heart done, rate and rhythm regular.    ABDOMEN: + bowel sounds, soft, nontender, no grimacing or guarding with palpation.  M/S:   generalized bilateral lower extremity edema  SKIN:  reviewed images of pacemaker site from 9/9 in EPIC  NEURO: moves extremities freely  PSYCH:  affect and mood normal      Labs done " in SNF are in [facility]. Please refer to them using EPIC/Care Everywhere. and Recent labs in EPIC reviewed by me today.     Assessment/Plan:  Right hip and leg pain  Fall, subsequent encounter  Generalized oseteoarthrosis  Pain has been managed, tramadol changed to PRN last week  Reports some knee pain after returning from therapy today, but tells me overall she hasn't been experiencing pain- has not used tramadol since changed to RPN  Plan: Continue tramadol 50 mg BID PRN, Tylenol 1000 mg 3 times daily. Change methocarbamol 750 mg q6h PRN. Ok for patient to use bengay at bedside PRN.  Monitor pain.  Weightbearing as tolerated.  Therapy as below.      Acute metabolic encephalopathy, resolved  Cognitive impairment  SLUMS 16/30 and CPT 4.9/5.6 during TCU stay in August  Plan: OCCUPATIONAL THERAPY to complete cognitive testing for safe discharge planning. Nursing to assist with cares, meals, medication assistance, activities.     History of recent E. coli UTI  9 mm right kidney stone on CT lumbar spine that also shows fat stranding and concern for chronic UTI  Received ceftriaxone initially on admission that was discontinued 8/25/2024  Urine culture grew urogenital tyler  Denies dysuria/trouble urinating  Plan: Monitor for symptoms. Follow up with urology outpatient.      Chronic HFpEF, LVEF 55-60% per ECHO on 4/19/2024  CAD with history of stents placement  Essential hypertension  Aortic stenosis  Mitral stenosis   Dyslipidemia  Patient is not on aspirin likely due to being on apixaban  Weight 234.8 lb on 9/10, generalized leg swelling today, SBP mostly 110-130s  Plan: Continue torsemide 20 mg twice daily, metoprolol 75 mg twice daily continue atorvastatin 20 mg at bedtime and apixaban 2.5 mg twice daily.  Continue nitroglycerin as needed.  Monitor blood pressure. Daily weights. Notify provider with weight gain >2 lbs in a day, >5 lbs in on week.  Heart healthy diet.     Atrial fibrillation s/p AVN with recent  PPM  Slow healing pacemaker site  Started on doxycyline on 9/5 for pacemaker incision, added keflex on 9/9 by cardiology   HR 70  Plan: Continue doxycylcine 100 mg daily x14 days through 9/19 and keflex 500 mg BID x7 days through 9/16/2024. Continue apixaban 2.5 mg twice daily, metoprolol 75 mg twice daily.  Monitor heart rate. Follow up with cardiology clinic as scheduled.     Diet controlled Type 2 diabetes  Hemoglobin A1c 7.2% on 6/27/2024  Plan: Blood sugar checks to daily as needed.  Follow-up with PCP outpatient.     Asthma  Respiratory status is stable  Plan: Continue fluticasone-salmeterol 1 puff every 12 hours, albuterol HFA 2 puffs every 6 hours as needed.  Monitor respiratory status.     CKD stage 4  Baseline creatinine 1.8-2  Creatinine 1.99 on 9/3/2024- at baseline  Plan: BMP pending today. Avoid nephrotoxins. Renally dose medications as indicated.     Depression  Estimated Creatinine Clearance: 23.6 mL/min (A) (based on SCr of 1.99 mg/dL (H)).  Plan: Per pharmacy recommendations, patient has been on duloxetine long-term, no current noted side effects.  Okay to continue duloxetine 60 mg daily and bupropion  mg daily.  Consider dose reduction of duloxetine due to increased risk of side effects with CKD once patient is settled in new living situation to reduce triggers for depression.  Monitor mood and symptoms.  Consider ACP referral as needed.     GERD   Plan: Continue pantoprazole 20 mg daily.  Monitor symptoms.     Bilateral abdominal fold candidiasis  Plan: Continue miconazole twice daily as needed.  Monitor skin.     Insomnia  Started on melatonin in the TCU  Plan: Continue melatonin 3 mg at bedtime. Monitor sleep     Slow transit constipation  Bowels are moving regularly  Plan: Continue senna S to 1 tab twice daily and 1 tab twice daily as needed.  Monitor bowels.     ELIGIO  Obesity, BMI 36.65  Complicates care  Plan: Encourage weight loss. Dietician follows in the TCU.      Physical  deconditioning  Secondary to recent hospitalization, acute/ chronic medical conditions as above  Patient continues to work with therapy, LCD set for 9/11, patient will need to move to [facility]  Plan: Encourage participation in physical therapy/occupational therapy for strengthening and deconditioning. Discharge planning per their recommendation. Social work to assist with d/c planning.       MED REC REQUIRED  Post Medication Reconciliation Status:  Medication reconciliation previously completed during another office visit    Disclaimer: This note may contain text created using speech-recognition software and may contain unintended word substitutions.      Electronically signed by: YOHANNES Izquierdo CNP

## 2024-09-12 ENCOUNTER — DOCUMENTATION ONLY (OUTPATIENT)
Dept: GERIATRICS | Facility: CLINIC | Age: 89
End: 2024-09-12
Payer: COMMERCIAL

## 2024-09-12 ENCOUNTER — NURSING HOME VISIT (OUTPATIENT)
Dept: GERIATRICS | Facility: CLINIC | Age: 89
End: 2024-09-12
Payer: COMMERCIAL

## 2024-09-12 ENCOUNTER — LAB REQUISITION (OUTPATIENT)
Dept: LAB | Facility: CLINIC | Age: 89
End: 2024-09-12
Payer: COMMERCIAL

## 2024-09-12 VITALS
WEIGHT: 234 LBS | RESPIRATION RATE: 16 BRPM | BODY MASS INDEX: 36.73 KG/M2 | SYSTOLIC BLOOD PRESSURE: 106 MMHG | HEART RATE: 71 BPM | HEIGHT: 67 IN | TEMPERATURE: 97.6 F | DIASTOLIC BLOOD PRESSURE: 69 MMHG | OXYGEN SATURATION: 94 %

## 2024-09-12 DIAGNOSIS — I34.2 NON-RHEUMATIC MITRAL VALVE STENOSIS: ICD-10-CM

## 2024-09-12 DIAGNOSIS — M25.551 HIP PAIN, RIGHT: Primary | ICD-10-CM

## 2024-09-12 DIAGNOSIS — E11.9 DIET-CONTROLLED TYPE 2 DIABETES MELLITUS (H): ICD-10-CM

## 2024-09-12 DIAGNOSIS — K59.01 SLOW TRANSIT CONSTIPATION: ICD-10-CM

## 2024-09-12 DIAGNOSIS — N20.0 KIDNEY STONE: ICD-10-CM

## 2024-09-12 DIAGNOSIS — N18.4 CKD (CHRONIC KIDNEY DISEASE) STAGE 4, GFR 15-29 ML/MIN (H): ICD-10-CM

## 2024-09-12 DIAGNOSIS — R41.89 COGNITIVE IMPAIRMENT: ICD-10-CM

## 2024-09-12 DIAGNOSIS — I25.119 CORONARY ARTERY DISEASE INVOLVING NATIVE CORONARY ARTERY OF NATIVE HEART WITH ANGINA PECTORIS (H): ICD-10-CM

## 2024-09-12 DIAGNOSIS — I10 ESSENTIAL HYPERTENSION: ICD-10-CM

## 2024-09-12 DIAGNOSIS — G47.00 INSOMNIA, UNSPECIFIED TYPE: ICD-10-CM

## 2024-09-12 DIAGNOSIS — J45.20 INTERMITTENT ASTHMA WITHOUT COMPLICATION, UNSPECIFIED ASTHMA SEVERITY: ICD-10-CM
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
## 2024-09-12 DIAGNOSIS — N18.9 CHRONIC KIDNEY DISEASE, UNSPECIFIED: ICD-10-CM

## 2024-09-12 DIAGNOSIS — E78.5 DYSLIPIDEMIA: ICD-10-CM

## 2024-09-12 DIAGNOSIS — I48.91 ATRIAL FIBRILLATION, UNSPECIFIED TYPE (H): ICD-10-CM

## 2024-09-12 DIAGNOSIS — I35.0 NONRHEUMATIC AORTIC VALVE STENOSIS: ICD-10-CM

## 2024-09-12 DIAGNOSIS — I50.32 CHRONIC HEART FAILURE WITH PRESERVED EJECTION FRACTION (HFPEF) (H): ICD-10-CM

## 2024-09-12 DIAGNOSIS — Z87.440 PERSONAL HISTORY OF URINARY TRACT INFECTION: ICD-10-CM

## 2024-09-12 DIAGNOSIS — R53.81 PHYSICAL DECONDITIONING: ICD-10-CM

## 2024-09-12 DIAGNOSIS — M15.9 GENERALIZED OSTEOARTHROSIS, INVOLVING MULTIPLE SITES: ICD-10-CM

## 2024-09-12 DIAGNOSIS — K21.9 CHRONIC GASTROESOPHAGEAL REFLUX DISEASE: ICD-10-CM

## 2024-09-12 DIAGNOSIS — F32.A DEPRESSION, UNSPECIFIED DEPRESSION TYPE: ICD-10-CM

## 2024-09-12 DIAGNOSIS — Z95.0 CARDIAC PACEMAKER IN SITU: ICD-10-CM

## 2024-09-12 LAB
MDC_IDC_LEAD_CONNECTION_STATUS: NORMAL
MDC_IDC_LEAD_IMPLANT_DT: NORMAL
MDC_IDC_LEAD_LOCATION: NORMAL
MDC_IDC_LEAD_LOCATION_DETAIL_1: NORMAL
MDC_IDC_LEAD_MFG: NORMAL
MDC_IDC_LEAD_MODEL: NORMAL
MDC_IDC_LEAD_POLARITY_TYPE: NORMAL
MDC_IDC_LEAD_SERIAL: NORMAL
MDC_IDC_MSMT_BATTERY_DTM: NORMAL
MDC_IDC_MSMT_BATTERY_REMAINING_LONGEVITY: 169 MO
MDC_IDC_MSMT_BATTERY_RRT_TRIGGER: 2.62
MDC_IDC_MSMT_BATTERY_STATUS: NORMAL
MDC_IDC_MSMT_BATTERY_VOLTAGE: 3.21 V
MDC_IDC_MSMT_LEADCHNL_RV_IMPEDANCE_VALUE: 418 OHM
MDC_IDC_MSMT_LEADCHNL_RV_IMPEDANCE_VALUE: 798 OHM
MDC_IDC_MSMT_LEADCHNL_RV_PACING_THRESHOLD_AMPLITUDE: 0.75 V
MDC_IDC_MSMT_LEADCHNL_RV_PACING_THRESHOLD_PULSEWIDTH: 0.4 MS
MDC_IDC_MSMT_LEADCHNL_RV_SENSING_INTR_AMPL: 13.75 MV
MDC_IDC_PG_IMPLANT_DTM: NORMAL
MDC_IDC_PG_MFG: NORMAL
MDC_IDC_PG_MODEL: NORMAL
MDC_IDC_PG_SERIAL: NORMAL
MDC_IDC_PG_TYPE: NORMAL
MDC_IDC_SESS_CLINIC_NAME: NORMAL
MDC_IDC_SESS_DTM: NORMAL
MDC_IDC_SESS_TYPE: NORMAL
MDC_IDC_SET_BRADY_HYSTRATE: NORMAL
MDC_IDC_SET_BRADY_LOWRATE: 70 {BEATS}/MIN
MDC_IDC_SET_BRADY_MAX_SENSOR_RATE: 120 {BEATS}/MIN
MDC_IDC_SET_BRADY_MODE: NORMAL
MDC_IDC_SET_LEADCHNL_RV_PACING_AMPLITUDE: 2 V
MDC_IDC_SET_LEADCHNL_RV_PACING_ANODE_ELECTRODE_1: NORMAL
MDC_IDC_SET_LEADCHNL_RV_PACING_ANODE_LOCATION_1: NORMAL
MDC_IDC_SET_LEADCHNL_RV_PACING_CAPTURE_MODE: NORMAL
MDC_IDC_SET_LEADCHNL_RV_PACING_CATHODE_ELECTRODE_1: NORMAL
MDC_IDC_SET_LEADCHNL_RV_PACING_CATHODE_LOCATION_1: NORMAL
MDC_IDC_SET_LEADCHNL_RV_PACING_POLARITY: NORMAL
MDC_IDC_SET_LEADCHNL_RV_PACING_PULSEWIDTH: 0.4 MS
MDC_IDC_SET_LEADCHNL_RV_SENSING_ANODE_ELECTRODE_1: NORMAL
MDC_IDC_SET_LEADCHNL_RV_SENSING_ANODE_LOCATION_1: NORMAL
MDC_IDC_SET_LEADCHNL_RV_SENSING_CATHODE_ELECTRODE_1: NORMAL
MDC_IDC_SET_LEADCHNL_RV_SENSING_CATHODE_LOCATION_1: NORMAL
MDC_IDC_SET_LEADCHNL_RV_SENSING_POLARITY: NORMAL
MDC_IDC_SET_LEADCHNL_RV_SENSING_SENSITIVITY: 0.9 MV
MDC_IDC_SET_ZONE_DETECTION_INTERVAL: 400 MS
MDC_IDC_SET_ZONE_STATUS: NORMAL
MDC_IDC_SET_ZONE_TYPE: NORMAL
MDC_IDC_SET_ZONE_VENDOR_TYPE: NORMAL
MDC_IDC_STAT_BRADY_DTM_END: NORMAL
MDC_IDC_STAT_BRADY_DTM_START: NORMAL
MDC_IDC_STAT_BRADY_RV_PERCENT_PACED: 97.93 %
MDC_IDC_STAT_EPISODE_RECENT_COUNT: 0
MDC_IDC_STAT_EPISODE_RECENT_COUNT_DTM_END: NORMAL
MDC_IDC_STAT_EPISODE_RECENT_COUNT_DTM_START: NORMAL
MDC_IDC_STAT_EPISODE_TOTAL_COUNT: 0
MDC_IDC_STAT_EPISODE_TOTAL_COUNT_DTM_END: NORMAL
MDC_IDC_STAT_EPISODE_TOTAL_COUNT_DTM_START: NORMAL
MDC_IDC_STAT_EPISODE_TYPE: NORMAL

## 2024-09-12 PROCEDURE — 99310 SBSQ NF CARE HIGH MDM 45: CPT

## 2024-09-12 NOTE — PROGRESS NOTES
[facility] GERIATRICS    PRIMARY CARE PROVIDER AND CLINIC:  YOHANNES Charles CNP, [address] [facility] / [place] [address]  Chief Complaint   Patient presents with    [facility] Medical Record Number:  [id number]  Place of Service where encounter took place:  [facility]  () [[id number]]    Moira Andre  is a 89 year old  (9/24/1933), admitted to the above facility from  [facility]. Hospital stay 7/30 through 8/7/24..       By chart review, patient was admitted to [facility] 7/30-8/7/24 for UTI and discharged to previous home. She was rehospitalized 8/2308/28 with generalized weakness and a fall. In ED, labs remarkable for Cr 2.08, WBC 11.0. UA was abnormal. CT of the pelvis and lumbar spine were negative for fracture, high-grade spinal canal foraminal stenosis, noted 9 mm right kidney stone with moderate chronic fat stranding concerning for chronic UTI. CT of the head and cervical spine were negative for acute findings. She received IV ceftiraxone which was discontinued after UC returned without growth 8/25. Ortho was consulted for right hip pain and MRI did not demonstrate an acute fracture, demonstrated advanced degenerative changes with moderate effusion. Hospitalization complicated by encephalopathy attributed to pain medications and oxycodone was changed to tramadol with improvement. Cr improved with IVF. She was recommended TCU at discharge, admitted to current facility for ongoing medical management, rehab, nursing care.     In TCU, met goals with PT/OT for discharge to [place]. Pain medications were weaned. SLUMS 16/30 and CPT 4.9/5.6. She was started on doxy and then keflex with concern for slow-healing pacemaker site with infection. She is admitting to LTC awaiting AL placement with some spend down requirement prior to MA application.    HPI:    Seen today up on the unit in LTC. She is getting involved in activities. Reports her children are  working on relocation and she is moving to [place], anticipates it may take a few months. She is denying CP, SOB, changes in b/b habits, fever/chills.     CODE STATUS/ADVANCE DIRECTIVES DISCUSSION:  Full Code  CPR/Full code   ALLERGIES:   Allergies   Allergen Reactions    Aspirin Hives     Reaction occurred during childhood.     Lidocaine Itching    Lisinopril Cough    Losartan      Hyperkalemia      Metformin      Elevated lactic acid    Minocycline      Yellow Dye Allergy. Minocycline has Yellow Dye #10.    Mounjaro [Tirzepatide] Diarrhea and GI Disturbance    Salicylates Hives    Yellow Dye Hives     Rxn to yellow tablet. Eyes swelled shut.     Yellow Dyes (Non-Tartrazine) Hives      PAST MEDICAL HISTORY:   Past Medical History:   Diagnosis Date    Aortic valve sclerosis     heart murmur, no AS    Arrhythmia     PAT, PVC    Aspirin allergy     Plavix use long term    Asthma     CKD (chronic kidney disease) stage 3, GFR 30-59 ml/min (H)     x 2007 atleast    Congestive heart failure, unspecified     Depression     Diabetes mellitus (H) 2010    Diastolic dysfunction, left ventricle 2013    grade 2, nl ef    HTN (hypertension)     Lactic acidosis 08/2018    due to dehydration and metformin    Migraine headache     Mitral stenosis     mild, 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    Myocardial infarction (H) 9/2007, cath 2013 ml    BMS: stent to OM, diag, nl EF, echo /C angia 2013 , f/u cath no lesion >40%    Nephrolithiasis     right side    OA (osteoarthritis) of knee     Obesity     Rheumatoid arthritis flare (H)     prednisone    Sleep apnea     restarted using cpap 2017    TIA (transient ischaemic attack)     Ventral hernia, unspecified, without mention of obstruction or gangrene       PAST SURGICAL HISTORY:   has a past surgical history that includes Cholecystectomy; Biopsy breast; appendectomy; 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  FAMILY HISTORY: family history includes C.A.D. in her father; Neurologic Disorder in her mother.  SOCIAL HISTORY:   reports that she quit smoking about 51 years ago. Her smoking use included cigarettes. She started smoking about 52 years ago. She has a 0.3 pack-year smoking history. She has never used smokeless tobacco. She reports current alcohol use. She reports that she does not use drugs.  Patient's living condition: lives alone    Post Discharge Medication Reconciliation Status:   MED REC REQUIRED  Post Medication Reconciliation Status: discharge medications reconciled and changed, per note/orders       Current Outpatient Medications   Medication Sig Dispense Refill    acetaminophen (TYLENOL) 500 MG tablet Take 2 tablets (1,000 mg) by mouth 3 times daily. 100 tablet 0    albuterol (PROAIR HFA/PROVENTIL HFA/VENTOLIN HFA) 108 (90 Base) MCG/ACT inhaler Inhale 2 puffs into the lungs every 6 hours as needed for shortness of breath, wheezing or cough For shortness of breath      apixaban ANTICOAGULANT (ELIQUIS) 2.5 MG tablet Take 1 tablet (2.5 mg) by mouth 2 times daily 180 tablet 3    atorvastatin (LIPITOR) 20 MG tablet Take 1 tablet (20 mg) by mouth daily for cholesterol 100 tablet 3    blood glucose (ACCU-CHEK GUIDE) test strip Use to test blood sugar once daily or as directed. 100 strip 3    blood glucose (ACCU-CHEK SOFTCLIX) lancing device Lancing device to be used with lancets. 1 each 0    blood glucose monitoring (SOFTCLIX) lancets Use to test blood sugar once daily. 100 each 3    buPROPion (WELLBUTRIN SR) 100 MG 12 hr tablet Take 1 tablet (100 mg) by mouth daily for mood 90 tablet 3    calcium carbonate (TUMS) 500 MG chewable tablet Take 1 chew tab by mouth 2 times daily  as needed for heartburn      cephALEXin (KEFLEX) 500 MG capsule Take 1 capsule (500 mg) by mouth 2 times daily for 7 days. 14 capsule 0    Cholecalciferol (VITAMIN D) 125 MCG (5000 UT) capsule Take 1 tablet by mouth every evening      doxycycline hyclate (VIBRAMYCIN) 100 MG capsule Take 1 capsule (100 mg) by mouth daily for 14 days.      DULoxetine (CYMBALTA) 60 MG capsule Take 1 capsule (60 mg) by mouth daily 90 capsule 3    fexofenadine (ALLEGRA) 60 MG tablet Take 60 mg by mouth daily      fluticasone-salmeterol (WIXELA INHUB) 100-50 MCG/ACT inhaler USE 1 INHALATION BY MOUTH EVERY  12 HOURS (Patient taking differently: Inhale 1 puff into the lungs daily. USE 1 INHALATION BY MOUTH EVERY  12 HOURS) 180 each 3    melatonin 3 MG tablet Take 1 tablet (3 mg) by mouth at bedtime.      Menthol, Topical Analgesic, (ICY HOT MEDICATED SPRAY EX) Externally apply topically daily as needed (pain, in neck, back, hand)      methocarbamol (ROBAXIN) 750 MG tablet Take 1 tablet (750 mg) by mouth 4 times daily as needed for muscle spasms.      metoprolol tartrate (LOPRESSOR) 25 MG tablet TAKE 3 TABLETS BY MOUTH TWICE  DAILY 480 tablet 2    miconazole (MICATIN) 2 % external cream Apply topically 2 times daily as needed for other (rash/irritation)      nitroGLYcerin (NITROSTAT) 0.4 MG sublingual tablet FOR CHEST PAIN PLACE 1 TABLET UNDER THE TONGUE EVERY 5 MINUTES FOR 3 DOSES. IF SYMPTOMS PERSIST 5 MINUTES AFTER 1ST DOSE CALL 911 25 tablet 0    pantoprazole (PROTONIX) 20 MG EC tablet Take 1 tablet (20 mg) by mouth daily 90 tablet 3    senna-docusate (SENOKOT-S/PERICOLACE) 8.6-50 MG tablet Take 1 tablet by mouth 2 times daily. And BID PRN      torsemide (DEMADEX) 20 MG tablet Take 1 tablet (20 mg) by mouth 2 times daily 60 tablet 1    traMADol (ULTRAM) 50 MG tablet Take 1 tablet (50 mg) by mouth 2 times daily as needed for severe pain. 20 tablet 0    triamcinolone (KENALOG) 0.1 % external cream Apply topically 2 times daily as needed  "for irritation       No current facility-administered medications for this visit.       ROS:  Limited secondary to cognitive impairment but today pt reports the above and 4 point ROS including Respiratory, CV, GI and , other than that noted in the HPI,  is negative    Vitals:  /69   Pulse 71   Temp 97.6  F (36.4  C)   Resp 16   Ht 1.702 m (5' 7\")   Wt 106.1 kg (234 lb)   SpO2 94%   BMI 36.65 kg/m    Exam:  GENERAL APPEARANCE:  Alert, in no distress, appears younger than stated age  RESP:  respiratory effort and palpation of chest normal, lungs clear to auscultation , no respiratory distress  CV:  regular rate and rhythm, no murmur, rub, or gallop, generalized LE edema  ABDOMEN:  normal bowel sounds, soft, nontender, no hepatosplenomegaly or other masses  M/S:   Gait and station abnormal transfers with assist, wheelchair for mobility   SKIN:  Inspection of skin and subcutaneous tissue baseline, Palpation of skin and subcutaneous tissue baseline, PPM site is dressed  NEURO:   matthews freely  PSYCH:  memory impaired , affect and mood normal    Lab/Diagnostic data:  Labs done in SNF are in [facility]. Please refer to them using [facility]Care Everywhere. and Recent labs in EPIC reviewed by me today.     ASSESSMENT/P TRAVIS:    (M25.551) Hip pain, right  (primary encounter diagnosis)  (W19.XXXD) Fall, subsequent encounter  (M15.9) Generalized osteoarthrosis, involving multiple sites  Comment: ongoing, pain is well controlled.   Plan: continue tramadol BID PRN, tylenol 1000 mg TID, methocarbamol 750 mg Q6H PRN. OK for topical bengay at bedside. WBAT.     (R41.89) Cognitive impairment  Comment: chronic, SLUMS 16/30 and CPT 4.9/5.6 in TCU. Plans to transition to AL  Plan: LTC for assist with ADLs, medication management, meals, activities. Social work following for discharge planning    (Z37.316) Personal history of urinary tract infection  (N20.0) Kidney stone  Comment: recent UTI, concern for chronic UTI with fat " stranding near right kidney stone on imaging. Received Rocephin before repeat cultures returned negative.   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 SBP fairly controlled.  BP Readings from Last 3 Encounters:   09/12/24 106/69   09/10/24 126/80   09/05/24 124/80   Plan: continue torsemide 20 mg bid, metoprolol 75 mg bid, atorvastatin 20 mg at bedtime and apixaban 2.5 mg BID. Continue PRN NTG. Continue daily weights, monitor weights and exam. Follow-up with cardiology as directed      (I48.91) Atrial fibrillation, unspecified type (H)  Comment: chronic, s/p AVN, continues on AC  Plan: continue apixaban 2.5 mg BID, metoprolol 75 mg bid. Monitor HR, follow-up with cardiology as directed    (Z95.0) Cardiac pacemaker in situ  Comment: with delayed site healing. Started on doxy and added keflex per cardiology.  Plan: continue doxy through 9/5, keflex through 9/9. Follow-up as directed    (E11.9) Diet-controlled type 2 diabetes mellitus (H)  Comment: chronic, A1c 7.2 6/2024  Plan: continue BG PRN, follow-up outpatient     (J45.20) Intermittent asthma without complication, unspecified asthma severity  Comment: chronic, stable  Plan: continue fluticasone salmeterol 1 puff BID, albuterol HFA 2 puffs QID PRN, monitor respiratory status    (N18.4) CKD (chronic kidney disease) stage 4, GFR 15-29 ml/min (H)  Comment: chronic, baseline Cr 1.8-2  Plan: Avoid nephrotoxins. Renally dose medications as appropriate. Monitor BMP periodically     (F32.A) Depression, unspecified depression type  Comment: chronic, on duloxetine. Per pharmacy  recommendations consider decrease dose due to CKD, will hold on changes given recent move and she is asymptomatic   Plan: continue duloxetine 60 mg daily, bupropion  mg daily. Monitor symptoms. ACP PRN    (K21.9) Chronic gastroesophageal reflux disease  Comment: chronic by history  Plan: continue pantoprazole 20 mg daily    (G47.00) Insomnia, unspecified type  Comment: chronic  Plan: continue melatonin 3 mg at bedtime, monitor sleep habits per nursing    (K59.01) Slow transit constipation  Comment: ongoing  Plan: continue senna-s 1 tab bid and 1 tab BID PRN, monitor bowel habits per nursing    (R53.81) Physical deconditioning  Comment: acute on chronic, related to acute and chronic conditions as above, recent hospitalization. Completed a course of PT/OT.   Plan: LTC for assist as needed      Total time spent with patient visit at the skilled nursing facility was 48 minutes including patient visit, review of past records, and review of management with nursing facility staff.       Electronically signed by:  YOHANNES Charles CNP

## 2024-09-12 NOTE — LETTER
9/12/2024      Moira Andre  [address]  [facility] [address]        [facility] GERIATRICS    PRIMARY CARE PROVIDER AND CLINIC:  YOHANNES Charles CNP, [address] [facility] / [place] [address]  Chief Complaint   Patient presents with     [facility] Medical Record Number:  [id number]  Place of Service where encounter took place:  [facility]  () [[id number]]    Moira Andre  is a 89 year old  (9/24/1933), admitted to the above facility from  [facility]. Hospital stay 7/30 through 8/7/24..       By chart review, patient was admitted to [facility] 7/30-8/7/24 for UTI and discharged to previous home. She was rehospitalized 8/2308/28 with generalized weakness and a fall. In ED, labs remarkable for Cr 2.08, WBC 11.0. UA was abnormal. CT of the pelvis and lumbar spine were negative for fracture, high-grade spinal canal foraminal stenosis, noted 9 mm right kidney stone with moderate chronic fat stranding concerning for chronic UTI. CT of the head and cervical spine were negative for acute findings. She received IV ceftiraxone which was discontinued after UC returned without growth 8/25. Ortho was consulted for right hip pain and MRI did not demonstrate an acute fracture, demonstrated advanced degenerative changes with moderate effusion. Hospitalization complicated by encephalopathy attributed to pain medications and oxycodone was changed to tramadol with improvement. Cr improved with IVF. She was recommended TCU at discharge, admitted to current facility for ongoing medical management, rehab, nursing care.     In TCU, met goals with PT/OT for discharge to [place]. Pain medications were weaned. SLUMS 16/30 and CPT 4.9/5.6. She was started on doxy and then keflex with concern for slow-healing pacemaker site with infection. She is admitting to LTC awaiting AL placement with some spend down requirement prior to MA application.    HPI:    Seen today up  on the unit in LTC. She is getting involved in activities. Reports her children are working on relocation and she is moving to [place], anticipates it may take a few months. She is denying CP, SOB, changes in b/b habits, fever/chills.     CODE STATUS/ADVANCE DIRECTIVES DISCUSSION:  Full Code  CPR/Full code   ALLERGIES:   Allergies   Allergen Reactions     Aspirin Hives     Reaction occurred during childhood.      Lidocaine Itching     Lisinopril Cough     Losartan      Hyperkalemia       Metformin      Elevated lactic acid     Minocycline      Yellow Dye Allergy. Minocycline has Yellow Dye #10.     Mounjaro [Tirzepatide] Diarrhea and GI Disturbance     Salicylates Hives     Yellow Dye Hives     Rxn to yellow tablet. Eyes swelled shut.      Yellow Dyes (Non-Tartrazine) Hives      PAST MEDICAL HISTORY:   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     Myocardial infarction (H) 9/2007, cath 2013 ml    BMS: stent to OM, diag, nl EF, echo /C angia 2013 , f/u cath no lesion >40%     Nephrolithiasis     right side     OA (osteoarthritis) of knee      Obesity      Rheumatoid arthritis flare (H)     prednisone     Sleep apnea     restarted using cpap 2017     TIA (transient ischaemic attack)      Ventral hernia, unspecified, without mention of obstruction or gangrene       PAST SURGICAL HISTORY:   has a past surgical history that includes Cholecystectomy; Biopsy breast; appendectomy; right femoral artery  pseudoaneurysm (9/2007); Hysterectomy total abdominal; Release carpal tunnel; orthopedic surgery; LEFT HEART CATHETERIZATION (8/2013); Coronary Angiography Adult Order (9/28/2007); Coronary Angiography Adult Order (9/25/2007); Pacemaker Device & Lead Implant- Right ventricular (N/A, 7/12/2024); and Ablation Atrioventricular Node (N/A, 7/12/2024).  FAMILY HISTORY: family history includes C.A.D. in her father; Neurologic Disorder in her mother.  SOCIAL HISTORY:   reports that she quit smoking about 51 years ago. Her smoking use included cigarettes. She started smoking about 52 years ago. She has a 0.3 pack-year smoking history. She has never used smokeless tobacco. She reports current alcohol use. She reports that she does not use drugs.  Patient's living condition: lives alone    Post Discharge Medication Reconciliation Status:   MED REC REQUIRED  Post Medication Reconciliation Status: discharge medications reconciled and changed, per note/orders       Current Outpatient Medications   Medication Sig Dispense Refill     acetaminophen (TYLENOL) 500 MG tablet Take 2 tablets (1,000 mg) by mouth 3 times daily. 100 tablet 0     albuterol (PROAIR HFA/PROVENTIL HFA/VENTOLIN HFA) 108 (90 Base) MCG/ACT inhaler Inhale 2 puffs into the lungs every 6 hours as needed for shortness of breath, wheezing or cough For shortness of breath       apixaban ANTICOAGULANT (ELIQUIS) 2.5 MG tablet Take 1 tablet (2.5 mg) by mouth 2 times daily 180 tablet 3     atorvastatin (LIPITOR) 20 MG tablet Take 1 tablet (20 mg) by mouth daily for cholesterol 100 tablet 3     blood glucose (ACCU-CHEK GUIDE) test strip Use to test blood sugar once daily or as directed. 100 strip 3     blood glucose (ACCU-CHEK SOFTCLIX) lancing device Lancing device to be used with lancets. 1 each 0     blood glucose monitoring (SOFTCLIX) lancets Use to test blood sugar once daily. 100 each 3     buPROPion (WELLBUTRIN SR) 100 MG 12 hr tablet Take 1 tablet (100 mg) by mouth  daily for mood 90 tablet 3     calcium carbonate (TUMS) 500 MG chewable tablet Take 1 chew tab by mouth 2 times daily as needed for heartburn       cephALEXin (KEFLEX) 500 MG capsule Take 1 capsule (500 mg) by mouth 2 times daily for 7 days. 14 capsule 0     Cholecalciferol (VITAMIN D) 125 MCG (5000 UT) capsule Take 1 tablet by mouth every evening       doxycycline hyclate (VIBRAMYCIN) 100 MG capsule Take 1 capsule (100 mg) by mouth daily for 14 days.       DULoxetine (CYMBALTA) 60 MG capsule Take 1 capsule (60 mg) by mouth daily 90 capsule 3     fexofenadine (ALLEGRA) 60 MG tablet Take 60 mg by mouth daily       fluticasone-salmeterol (WIXELA INHUB) 100-50 MCG/ACT inhaler USE 1 INHALATION BY MOUTH EVERY  12 HOURS (Patient taking differently: Inhale 1 puff into the lungs daily. USE 1 INHALATION BY MOUTH EVERY  12 HOURS) 180 each 3     melatonin 3 MG tablet Take 1 tablet (3 mg) by mouth at bedtime.       Menthol, Topical Analgesic, (ICY HOT MEDICATED SPRAY EX) Externally apply topically daily as needed (pain, in neck, back, hand)       methocarbamol (ROBAXIN) 750 MG tablet Take 1 tablet (750 mg) by mouth 4 times daily as needed for muscle spasms.       metoprolol tartrate (LOPRESSOR) 25 MG tablet TAKE 3 TABLETS BY MOUTH TWICE  DAILY 480 tablet 2     miconazole (MICATIN) 2 % external cream Apply topically 2 times daily as needed for other (rash/irritation)       nitroGLYcerin (NITROSTAT) 0.4 MG sublingual tablet FOR CHEST PAIN PLACE 1 TABLET UNDER THE TONGUE EVERY 5 MINUTES FOR 3 DOSES. IF SYMPTOMS PERSIST 5 MINUTES AFTER 1ST DOSE CALL 911 25 tablet 0     pantoprazole (PROTONIX) 20 MG EC tablet Take 1 tablet (20 mg) by mouth daily 90 tablet 3     senna-docusate (SENOKOT-S/PERICOLACE) 8.6-50 MG tablet Take 1 tablet by mouth 2 times daily. And BID PRN       torsemide (DEMADEX) 20 MG tablet Take 1 tablet (20 mg) by mouth 2 times daily 60 tablet 1     traMADol (ULTRAM) 50 MG tablet Take 1 tablet (50 mg) by mouth 2 times  "daily as needed for severe pain. 20 tablet 0     triamcinolone (KENALOG) 0.1 % external cream Apply topically 2 times daily as needed for irritation       No current facility-administered medications for this visit.       ROS:  Limited secondary to cognitive impairment but today pt reports the above and 4 point ROS including Respiratory, CV, GI and , other than that noted in the HPI,  is negative    Vitals:  /69   Pulse 71   Temp 97.6  F (36.4  C)   Resp 16   Ht 1.702 m (5' 7\")   Wt 106.1 kg (234 lb)   SpO2 94%   BMI 36.65 kg/m    Exam:  GENERAL APPEARANCE:  Alert, in no distress, appears younger than stated age  RESP:  respiratory effort and palpation of chest normal, lungs clear to auscultation , no respiratory distress  CV:  regular rate and rhythm, no murmur, rub, or gallop, generalized LE edema  ABDOMEN:  normal bowel sounds, soft, nontender, no hepatosplenomegaly or other masses  M/S:   Gait and station abnormal transfers with assist, wheelchair for mobility   SKIN:  Inspection of skin and subcutaneous tissue baseline, Palpation of skin and subcutaneous tissue baseline, PPM site is dressed  NEURO:   matthews freely  PSYCH:  memory impaired , affect and mood normal    Lab/Diagnostic data:  Labs done in SNF are in [facility]. Please refer to them using [employer] Everywhere. and Recent labs in [facility] reviewed by me today.     ASSESSMENT/P TRAVIS:    (M25.551) Hip pain, right  (primary encounter diagnosis)  (W19.XXXD) Fall, subsequent encounter  (M15.9) Generalized osteoarthrosis, involving multiple sites  Comment: ongoing, pain is well controlled.   Plan: continue tramadol BID PRN, tylenol 1000 mg TID, methocarbamol 750 mg Q6H PRN. OK for topical bengay at bedside. WBAT.     (R41.89) Cognitive impairment  Comment: chronic, SLUMS 16/30 and CPT 4.9/5.6 in TCU. Plans to transition to AL  Plan: LTC for assist with ADLs, medication management, meals, activities. Social work following for discharge " planning    (Z87.440) Personal history of urinary tract infection  (N20.0) Kidney stone  Comment: recent UTI, concern for chronic UTI with fat stranding near right kidney stone on imaging. Received Rocephin before repeat cultures returned negative.   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 SBP fairly controlled.  BP Readings from Last 3 Encounters:   09/12/24 106/69   09/10/24 126/80   09/05/24 124/80   Plan: continue torsemide 20 mg bid, metoprolol 75 mg bid, atorvastatin 20 mg at bedtime and apixaban 2.5 mg BID. Continue PRN NTG. Continue daily weights, monitor weights and exam. Follow-up with cardiology as directed      (I48.91) Atrial fibrillation, unspecified type (H)  Comment: chronic, s/p AVN, continues on AC  Plan: continue apixaban 2.5 mg BID, metoprolol 75 mg bid. Monitor HR, follow-up with cardiology as directed    (Z95.0) Cardiac pacemaker in situ  Comment: with delayed site healing. Started on doxy and added keflex per cardiology.  Plan: continue doxy through 9/5, keflex through 9/9. Follow-up as directed    (E11.9) Diet-controlled type 2 diabetes mellitus (H)  Comment: chronic, A1c 7.2 6/2024  Plan: continue BG PRN, follow-up outpatient     (J45.20) Intermittent asthma without complication, unspecified asthma severity  Comment: chronic, stable  Plan: continue fluticasone salmeterol 1 puff BID, albuterol HFA 2 puffs QID PRN, monitor respiratory status    (N18.4) CKD (chronic kidney disease) stage 4, GFR 15-29 ml/min (H)  Comment: chronic, baseline Cr 1.8-2  Plan: Avoid nephrotoxins. Renally dose medications as  appropriate. Monitor BMP periodically     (F32.A) Depression, unspecified depression type  Comment: chronic, on duloxetine. Per pharmacy recommendations consider decrease dose due to CKD, will hold on changes given recent move and she is asymptomatic   Plan: continue duloxetine 60 mg daily, bupropion  mg daily. Monitor symptoms. ACP PRN    (K21.9) Chronic gastroesophageal reflux disease  Comment: chronic by history  Plan: continue pantoprazole 20 mg daily    (G47.00) Insomnia, unspecified type  Comment: chronic  Plan: continue melatonin 3 mg at bedtime, monitor sleep habits per nursing    (K59.01) Slow transit constipation  Comment: ongoing  Plan: continue senna-s 1 tab bid and 1 tab BID PRN, monitor bowel habits per nursing    (R53.81) Physical deconditioning  Comment: acute on chronic, related to acute and chronic conditions as above, recent hospitalization. Completed a course of PT/OT.   Plan: LTC for assist as needed      Total time spent with patient visit at the skilled nursing facility was 48 minutes including patient visit, review of past records, and review of management with nursing facility staff.       Electronically signed by:  YOHANNES Charles CNP                     Sincerely,        YOHANNES Charles CNP

## 2024-09-15 ENCOUNTER — LAB REQUISITION (OUTPATIENT)
Dept: LAB | Facility: CLINIC | Age: 89
End: 2024-09-15
Payer: COMMERCIAL

## 2024-09-15 DIAGNOSIS — N18.4 CHRONIC KIDNEY DISEASE, STAGE 4 (SEVERE) (H): ICD-10-CM

## 2024-09-16 LAB
ANION GAP SERPL CALCULATED.3IONS-SCNC: 18 MMOL/L (ref 7–15)
BUN SERPL-MCNC: 47.9 MG/DL (ref 8–23)
CALCIUM SERPL-MCNC: 9.3 MG/DL (ref 8.8–10.4)
CHLORIDE SERPL-SCNC: 99 MMOL/L (ref 98–107)
CREAT SERPL-MCNC: 2.11 MG/DL (ref 0.51–0.95)
EGFRCR SERPLBLD CKD-EPI 2021: 22 ML/MIN/1.73M2
GLUCOSE SERPL-MCNC: 125 MG/DL (ref 70–99)
HCO3 SERPL-SCNC: 21 MMOL/L (ref 22–29)
POTASSIUM SERPL-SCNC: 4.1 MMOL/L (ref 3.4–5.3)
SODIUM SERPL-SCNC: 138 MMOL/L (ref 135–145)

## 2024-09-16 PROCEDURE — P9603 ONE-WAY ALLOW PRORATED MILES: HCPCS

## 2024-09-16 PROCEDURE — 82947 ASSAY GLUCOSE BLOOD QUANT: CPT | Mod: ORL

## 2024-09-16 PROCEDURE — 82310 ASSAY OF CALCIUM: CPT

## 2024-09-16 PROCEDURE — 80048 BASIC METABOLIC PNL TOTAL CA: CPT | Mod: ORL

## 2024-09-16 PROCEDURE — 36415 COLL VENOUS BLD VENIPUNCTURE: CPT

## 2024-09-17 ENCOUNTER — PATIENT OUTREACH (OUTPATIENT)
Dept: CARE COORDINATION | Facility: CLINIC | Age: 89
End: 2024-09-17
Payer: COMMERCIAL

## 2024-09-17 NOTE — PROGRESS NOTES
Clinic Care Coordination Contact    The Community Health Worker planned to call for a Care Coordination outreach to follow up on goals and action steps.     Did Not Contact Patient.    Per Chart Review, patient is currently admitted at [facility].    CHW will outreach in one month.    Elissa Allison, BEATRIZ  Clinic Care Coordination  [facility]: [facility], [facility], [facility], [facility]  Phone: [phone]

## 2024-09-18 ENCOUNTER — LAB REQUISITION (OUTPATIENT)
Dept: LAB | Facility: CLINIC | Age: 89
End: 2024-09-18
Payer: COMMERCIAL

## 2024-09-18 DIAGNOSIS — N18.4 CHRONIC KIDNEY DISEASE, STAGE 4 (SEVERE) (H): ICD-10-CM

## 2024-09-18 LAB
MDC_IDC_LEAD_CONNECTION_STATUS: NORMAL
MDC_IDC_LEAD_IMPLANT_DT: NORMAL
MDC_IDC_LEAD_LOCATION: NORMAL
MDC_IDC_LEAD_LOCATION_DETAIL_1: NORMAL
MDC_IDC_LEAD_MFG: NORMAL
MDC_IDC_LEAD_MODEL: NORMAL
MDC_IDC_LEAD_POLARITY_TYPE: NORMAL
MDC_IDC_LEAD_SERIAL: NORMAL
MDC_IDC_PG_IMPLANT_DTM: NORMAL
MDC_IDC_PG_MFG: NORMAL
MDC_IDC_PG_MODEL: NORMAL
MDC_IDC_PG_SERIAL: NORMAL
MDC_IDC_PG_TYPE: NORMAL
MDC_IDC_SESS_CLINIC_NAME: NORMAL
MDC_IDC_SESS_DTM: NORMAL
MDC_IDC_SESS_TYPE: NORMAL

## 2024-09-18 NOTE — PROGRESS NOTES
Gabrielle Andre  9/24/1933  1) BMP 9/19. Diagnosis: CKD  Ilsa Marquez, APRN CNP on 9/18/2024 at 9:28 AM

## 2024-09-18 NOTE — TELEPHONE ENCOUNTER
Called and spoke with daughter. Pts daughter will send over updated pictures this evening or tomorrow am.  Bijal BLUE

## 2024-09-19 LAB
ANION GAP SERPL CALCULATED.3IONS-SCNC: 18 MMOL/L (ref 7–15)
BUN SERPL-MCNC: 43.5 MG/DL (ref 8–23)
CALCIUM SERPL-MCNC: 8.8 MG/DL (ref 8.8–10.4)
CHLORIDE SERPL-SCNC: 100 MMOL/L (ref 98–107)
CREAT SERPL-MCNC: 2.11 MG/DL (ref 0.51–0.95)
EGFRCR SERPLBLD CKD-EPI 2021: 22 ML/MIN/1.73M2
GLUCOSE SERPL-MCNC: 124 MG/DL (ref 70–99)
HCO3 SERPL-SCNC: 22 MMOL/L (ref 22–29)
POTASSIUM SERPL-SCNC: 4 MMOL/L (ref 3.4–5.3)
SODIUM SERPL-SCNC: 140 MMOL/L (ref 135–145)

## 2024-09-19 PROCEDURE — 36415 COLL VENOUS BLD VENIPUNCTURE: CPT | Performed by: NURSE PRACTITIONER

## 2024-09-19 PROCEDURE — 80048 BASIC METABOLIC PNL TOTAL CA: CPT | Mod: ORL | Performed by: NURSE PRACTITIONER

## 2024-09-19 PROCEDURE — 82947 ASSAY GLUCOSE BLOOD QUANT: CPT | Performed by: NURSE PRACTITIONER

## 2024-09-19 PROCEDURE — 80048 BASIC METABOLIC PNL TOTAL CA: CPT | Performed by: NURSE PRACTITIONER

## 2024-09-19 PROCEDURE — P9603 ONE-WAY ALLOW PRORATED MILES: HCPCS | Mod: ORL | Performed by: NURSE PRACTITIONER

## 2024-09-19 NOTE — PROGRESS NOTES
[facility] express received from [facility] on 8/26/24. Patient admitted for fall and PPM interrogation requested. Mechanical fall w/ right high and leg discomfort. Pt has recurrent falls. CT scans clear.    Presenting rhythm:   Battery: 14.2 years remaining   Lead Measurements: WNLs  Atrial Arrhythmias: n/a   Ventricular Arrhythmias: none    Follow-up Care: Continue with scheduled remote device checks.    MEG Bishop

## 2024-09-19 NOTE — PROGRESS NOTES
Gabrielle Andre  9/24/1933  1) BMP 9/24. dX: CKD  Ilsa Marquez, APRN CNP on 9/19/2024 at 10:48 AM

## 2024-09-20 NOTE — TELEPHONE ENCOUNTER
I spoke with patient's daughter, Sophia. She states that she went to [facility] yesterday to take the picture but her mom wasn't available. She touched base with the nursing staff and they agreed to send a photo of her incision. Sophia stated that it sounds like the plan to send this sometime next week on Monday or Tuesday.     I attempted to call Bryn [facility]. The HUC attempted to forward me to the 2nd floor but was on hold for 10 minutes. She then forwarded me to the charge RN who asked that we call back at a later time to be connected directly to the 2nd floor.    ADAM BLUE

## 2024-09-23 ENCOUNTER — PATIENT OUTREACH (OUTPATIENT)
Dept: CARE COORDINATION | Facility: CLINIC | Age: 89
End: 2024-09-23
Payer: COMMERCIAL

## 2024-09-23 NOTE — PROGRESS NOTES
Clinic Care Coordination Contact    Situation: Patient chart reviewed by care coordinator.    Background: SW reviewed chart for transition of care.  Pt remains in TCU at this time.     Assessment: SW will continue to follow.     Plan/Recommendations: SW to review in one month.      Maria L Mckeon  [facility]  Clinic Care Coordinator  [facility]  [phone]  [email or web address]

## 2024-09-23 NOTE — TELEPHONE ENCOUNTER
Called back to Bryn. Spoke with the [facility] who attempted to connect me with the second floor - line rang for 10 min with no answer. I called back to the [facility] who was very helpful and attempted to transfer me to the charge again but again no answer.  At this point I called patient's daughter Sophia back to try and form a plan on how to get updated pictures seeing as I have not been able to get a hold of her RN.   She did mention that patient will be in the clinic tomorrow to see Dr. Sinha.  We will work patient in to assess her incision at that time.  ADAM RN

## 2024-09-24 ENCOUNTER — LAB REQUISITION (OUTPATIENT)
Dept: LAB | Facility: CLINIC | Age: 89
End: 2024-09-24
Payer: COMMERCIAL

## 2024-09-24 ENCOUNTER — ANCILLARY PROCEDURE (OUTPATIENT)
Dept: CARDIOLOGY | Facility: CLINIC | Age: 89
End: 2024-09-24
Attending: INTERNAL MEDICINE
Payer: COMMERCIAL

## 2024-09-24 ENCOUNTER — OFFICE VISIT (OUTPATIENT)
Dept: CARDIOLOGY | Facility: CLINIC | Age: 89
End: 2024-09-24
Payer: COMMERCIAL

## 2024-09-24 VITALS
RESPIRATION RATE: 18 BRPM | HEART RATE: 73 BPM | WEIGHT: 230 LBS | OXYGEN SATURATION: 100 % | BODY MASS INDEX: 36.96 KG/M2 | HEIGHT: 66 IN | DIASTOLIC BLOOD PRESSURE: 72 MMHG | SYSTOLIC BLOOD PRESSURE: 108 MMHG

## 2024-09-24 DIAGNOSIS — I48.91 ATRIAL FIBRILLATION WITH RVR (H): ICD-10-CM

## 2024-09-24 DIAGNOSIS — N18.4 CHRONIC KIDNEY DISEASE, STAGE 4 (SEVERE) (H): ICD-10-CM

## 2024-09-24 DIAGNOSIS — I50.9 ACUTE ON CHRONIC CONGESTIVE HEART FAILURE, UNSPECIFIED HEART FAILURE TYPE (H): ICD-10-CM

## 2024-09-24 DIAGNOSIS — Z95.0 CARDIAC PACEMAKER IN SITU: Primary | ICD-10-CM

## 2024-09-24 DIAGNOSIS — N18.4 CHRONIC KIDNEY DISEASE, STAGE IV (SEVERE) (H): ICD-10-CM

## 2024-09-24 DIAGNOSIS — Z95.0 CARDIAC PACEMAKER IN SITU: ICD-10-CM

## 2024-09-24 DIAGNOSIS — Z98.890 HX OF ATRIOVENTRICULAR NODE ABLATION: ICD-10-CM

## 2024-09-24 DIAGNOSIS — T81.41XA SUPERFICIAL INCISIONAL INFECTION OF SURGICAL SITE: ICD-10-CM

## 2024-09-24 PROCEDURE — 99215 OFFICE O/P EST HI 40 MIN: CPT | Mod: 25 | Performed by: INTERNAL MEDICINE

## 2024-09-24 NOTE — PROGRESS NOTES
HPI and Plan:   I had the pleasure of following up with Ms. Andre today.  This was a most complex visit today and what made it worse was that her ride back to the nursing home came early before we completed.  I spent nearly an hour with her however.  On a positive note today is her 89st birthday    I last saw her in 2020 her history at that point was as follows----------------She had previously placed bare-metal stents to the OM1 and the diagonal. This was in 2 different sittings. She had a followup angiogram in 2013. Fortunately, it was not an acute coronary syndrome and the stents were open and she had her last angiogram in 2017 and again, there was some chest pain. It was not clear what the cause was. The 2017 angiogram shows left main artery normal, LAD had a 10% narrowing proximally and a 20%-30% narrowing in the midportion. The diagonal stent was widely patent. The second diagonal had a 40% narrowing. The left circumflex had a 50%-60% narrowing before the first obtuse marginal. The ramus branch had a stent in it and it was 30% narrowed. The right coronary artery had up to 25% narrowing. Flow wire was checked across the circumflex lesions and it was in the normal range. It was felt that that was a noncardiac problem. That chest pain has not recurred.    As mentioned I last saw her 4 years ago but quite a bit has happened particularly this year.  The reader is referred to her numerous readmissions to the hospital but basically she has had multiple falls and injuries but I do not believe any fractures from a cardiac standpoint she had atrial fibrillation and had more than 1 admission for that this year and she has diastolic heart failure I did review her echocardiograms her ejection fraction is preserved but she has now at least moderate mitral stenosis and moderate aortic stenosis.  If I understand the story correctly they could not control her atrial fibrillation with escalating dose of beta-blocker so in July  of this year she had AV node ablation and VVI pacemaker placed.  She is still on high-dose beta-blocker and her blood pressure is normal at 108/72 so I am going to start decreasing the beta-blocker since we do not need it for rate control and we may not need it for hypertension but we have to keep in mind she has diastolic dysfunction but again this may not be the classic diastolic dysfunction due to myocardial muscle problem it may be the aortic stenosis plus the mitral stenosis plus chronic renal insufficiency all causing fluid retention.  We do not have to worry about tachycardia nearly as much now that she has AV node ablation pacemaker and that may help lessen the hemodynamic consequences of the mitral stenosis in particular    She tells me that since her last discharge in the hospital and she has had several this year she is actually feeling better she is at [facility] and apparently getting good care she denies shortness of breath she has a longstanding history of sleep apnea has not been using the CPAP machine    In the office today she was completely coherent told me it was her birthday remembered all the things that I had done including stenting with her but very unusual is that she has auditory hallucinations she tells me she is currently in the office hearing voices and someone is singing opera.  She states she knows it is not there but she still hears it I did send a note back to her nursing home if they could have  the nurse practitioners review her medicines particularly her depression medications and her pain medications to make sure there is not a problem there    On her exam her lungs are relatively clear her pacer site has a little bit of scabbing but it is healing well it is not infected her heart exam of course is now regular since she is 100% paced after AV node ablation and VVI pacing and of course there are systolic murmurs I do not quite hear the diastolic murmur mitral stenosis    Her ankles  have trivial edema.  Her last creatinine is in the 2 range and that it has been up and down up and down.    At this time I would like to do the following grandma come back in about a month we will get a repeat BMP I am going to decrease her metoprolol from 75 twice daily to 25 twice daily I should also comment she told me she is losing her hair is not clear why but I do know that on occasion beta-blockers can lead to alopecia so I have lowered the dose of the beta-blocker for that plus again I do not think she needs it to control the atrial fibs now that the pacer is in and that she has had the ablation.  Will of course want a watch for fluid retention we have to be a little cautious with the diuretic because of her renal insufficiency but she is always can be at potential risk of congestive heart failure either because of true diastolic dysfunction with muscle relaxation problems of her heart plus her aortic and mitral valve disease    We obviously have some concern she is on NOAC because of atrial fibs but there is a history of recurrent falls with soft tissue injuries so organ have to weigh the risk benefit ratio of that at the next office visit  Today's visit was 45 minutes      Orders Placed This Encounter   Procedures    Basic metabolic panel    Follow-Up with Cardiology [facility]     No orders of the defined types were placed in this encounter.    There are no discontinued medications.      Encounter Diagnoses   Name Primary?    Cardiac pacemaker in situ Yes    Acute on chronic congestive heart failure, unspecified heart failure type (H)     Atrial fibrillation with RVR (H)     Chronic kidney disease, stage IV (severe) (H)        CURRENT MEDICATIONS:  Current Outpatient Medications   Medication Sig Dispense Refill    acetaminophen (TYLENOL) 500 MG tablet Take 2 tablets (1,000 mg) by mouth 3 times daily. 100 tablet 0    albuterol (PROAIR HFA/PROVENTIL HFA/VENTOLIN HFA) 108 (90 Base) MCG/ACT inhaler Inhale 2 puffs  into the lungs every 6 hours as needed for shortness of breath, wheezing or cough For shortness of breath      apixaban ANTICOAGULANT (ELIQUIS) 2.5 MG tablet Take 1 tablet (2.5 mg) by mouth 2 times daily 180 tablet 3    atorvastatin (LIPITOR) 20 MG tablet Take 1 tablet (20 mg) by mouth daily for cholesterol 100 tablet 3    blood glucose (ACCU-CHEK GUIDE) test strip Use to test blood sugar once daily or as directed. 100 strip 3    blood glucose (ACCU-CHEK SOFTCLIX) lancing device Lancing device to be used with lancets. 1 each 0    blood glucose monitoring (SOFTCLIX) lancets Use to test blood sugar once daily. 100 each 3    buPROPion (WELLBUTRIN SR) 100 MG 12 hr tablet Take 1 tablet (100 mg) by mouth daily for mood 90 tablet 3    calcium carbonate (TUMS) 500 MG chewable tablet Take 1 chew tab by mouth 2 times daily as needed for heartburn      Cholecalciferol (VITAMIN D) 125 MCG (5000 UT) capsule Take 1 tablet by mouth every evening      DULoxetine (CYMBALTA) 60 MG capsule Take 1 capsule (60 mg) by mouth daily 90 capsule 3    fexofenadine (ALLEGRA) 60 MG tablet Take 60 mg by mouth daily      fluticasone-salmeterol (WIXELA INHUB) 100-50 MCG/ACT inhaler USE 1 INHALATION BY MOUTH EVERY  12 HOURS (Patient taking differently: Inhale 1 puff into the lungs daily. USE 1 INHALATION BY MOUTH EVERY  12 HOURS) 180 each 3    melatonin 3 MG tablet Take 1 tablet (3 mg) by mouth at bedtime.      Menthol, Topical Analgesic, (ICY HOT MEDICATED SPRAY EX) Externally apply topically daily as needed (pain, in neck, back, hand)      methocarbamol (ROBAXIN) 750 MG tablet Take 1 tablet (750 mg) by mouth 4 times daily as needed for muscle spasms.      metoprolol tartrate (LOPRESSOR) 25 MG tablet TAKE 3 TABLETS BY MOUTH TWICE  DAILY 480 tablet 2    miconazole (MICATIN) 2 % external cream Apply topically 2 times daily as needed for other (rash/irritation)      nitroGLYcerin (NITROSTAT) 0.4 MG sublingual tablet FOR CHEST PAIN PLACE 1 TABLET UNDER  THE TONGUE EVERY 5 MINUTES FOR 3 DOSES. IF SYMPTOMS PERSIST 5 MINUTES AFTER 1ST DOSE CALL 911 25 tablet 0    pantoprazole (PROTONIX) 20 MG EC tablet Take 1 tablet (20 mg) by mouth daily 90 tablet 3    senna-docusate (SENOKOT-S/PERICOLACE) 8.6-50 MG tablet Take 1 tablet by mouth 2 times daily. And BID PRN      torsemide (DEMADEX) 20 MG tablet Take 1 tablet (20 mg) by mouth 2 times daily 60 tablet 1    traMADol (ULTRAM) 50 MG tablet Take 1 tablet (50 mg) by mouth 2 times daily as needed for severe pain. 20 tablet 0    triamcinolone (KENALOG) 0.1 % external cream Apply topically 2 times daily as needed for irritation         ALLERGIES     Allergies   Allergen Reactions    Aspirin Hives     Reaction occurred during childhood.     Lidocaine Itching    Lisinopril Cough    Losartan      Hyperkalemia      Metformin      Elevated lactic acid    Minocycline      Yellow Dye Allergy. Minocycline has Yellow Dye #10.    Mounjaro [Tirzepatide] Diarrhea and GI Disturbance    Salicylates Hives    Yellow Dye Hives     Rxn to yellow tablet. Eyes swelled shut.     Yellow Dyes (Non-Tartrazine) Hives       PAST MEDICAL HISTORY:  Past Medical History:   Diagnosis Date    Aortic valve stenosis     moderate    Arrhythmia     PAT, PVC    Aspirin allergy     Plavix use long term    Asthma     Chronic atrial fibrillation (H) 07/2024    failed meds,  s/p AVN ablation and VVI pacer    CKD (chronic kidney disease) stage 3, GFR 30-59 ml/min (H)     x 2007 atleast    Congestive heart failure, unspecified     Depression     Diabetes mellitus (H) 2010    Diastolic dysfunction, left ventricle 2013    grade 2, nl ef    Falls frequently     HTN (hypertension)     Lactic acidosis 08/2018    due to dehydration and metformin    Migraine headache     Mitral stenosis     moderate MS  mac    Myocardial infarction (H) 9/2007, cath 2013 ml    BMS: stent to OM, diag, nl EF, echo /C angia 2013 , f/u cath no lesion >40%    Nephrolithiasis     right side    OA  (osteoarthritis) of knee     Obesity     Rheumatoid arthritis flare (H)     prednisone    Sleep apnea     restarted using cpap     TIA (transient ischaemic attack)     Ventral hernia, unspecified, without mention of obstruction or gangrene        PAST SURGICAL HISTORY:  Past Surgical History:   Procedure Laterality Date    APPENDECTOMY      BIOPSY BREAST      x2 -needle & lumpectomy-benign    CHOLECYSTECTOMY      CORONARY ANGIOGRAPHY ADULT ORDER  2007    Bare metal stent to OM1, Diagonal patent     CORONARY ANGIOGRAPHY ADULT ORDER  2007    Stacy stent to Diagonal    EP ABLATION AV NODE N/A 2024    Procedure: Ablation Atrioventricular Node;  Surgeon: Davion Baron MD;  Location:  [facility]    EP PACEMAKER DEVICE & LEAD IMPLANT- RIGHT VENTRICULAR N/A 2024    Procedure: Pacemaker Device & Lead Implant- Right ventricular;  Surgeon: Davion Baron MD;  Location:  [facility]    HC LEFT HEART CATHETERIZATION  2013    Moderate CAD    HYSTERECTOMY TOTAL ABDOMINAL      ORTHOPEDIC SURGERY      knee replacement on right side (), Left side ()    RELEASE CARPAL TUNNEL      right and left    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       FAMILY HISTORY:  Family History   Problem Relation Age of Onset    Neurologic Disorder Mother         MS - at 60's    C.A.D. Father          at 8o's, ? prostate ca    Breast Cancer No family hx of     Cancer - colorectal No family hx of        SOCIAL HISTORY:  Social History     Socioeconomic History    Marital status:    Tobacco Use    Smoking status: Former     Current packs/day: 0.00     Average packs/day: 0.3 packs/day for 1.3 years (0.3 ttl pk-yrs)     Types: Cigarettes     Start date:      Quit date: 1973     Years since quittin.4    Smokeless tobacco: Never   Vaping Use    Vaping status: Never Used   Substance and Sexual Activity    Alcohol use: Yes     Alcohol/week: 0.0 - 1.0  standard drinks of alcohol     Comment: rarely    Drug use: No    Sexual activity: Not Currently     Partners: Male   Other Topics Concern    Caffeine Concern No    Sleep Concern No    Stress Concern No    Weight Concern No    Special Diet No    Exercise Yes     Comment: walking, swimming x2 a week    Seat Belt Yes   Social History Narrative    , 1 step son    Work- clown for profession        Tobacco- none,smoked for couple months in 1970's    ETOH- occ 1/2 months    Diet coke- 2-3 cans/day    Exercise- swims 8 laps -3/week             Social Determinants of Health     Financial Resource Strain: Low Risk  (8/24/2024)    Financial Resource Strain     Within the past 12 months, have you or your family members you live with been unable to get utilities (heat, electricity) when it was really needed?: No   Food Insecurity: Low Risk  (8/24/2024)    Food Insecurity     Within the past 12 months, did you worry that your food would run out before you got money to buy more?: No     Within the past 12 months, did the food you bought just not last and you didn t have money to get more?: No   Transportation Needs: Low Risk  (8/24/2024)    Transportation Needs     Within the past 12 months, has lack of transportation kept you from medical appointments, getting your medicines, non-medical meetings or appointments, work, or from getting things that you need?: No   Stress: No Stress Concern Present (6/10/2021)    [place] [employer] - Occupational Stress Questionnaire     Feeling of Stress : Not at all   Social Connections: Unknown (6/10/2021)    Social Connection and Isolation Panel [NHANES]     Marital Status:    Interpersonal Safety: Low Risk  (1/11/2024)    Interpersonal Safety     Do you feel physically and emotionally safe where you currently live?: Yes     Within the past 12 months, have you been hit, slapped, kicked or otherwise physically hurt by someone?: No     Within the past 12  "months, have you been humiliated or emotionally abused in other ways by your partner or ex-partner?: No   Housing Stability: Low Risk  (8/24/2024)    Housing Stability     Do you have housing? : Yes     Are you worried about losing your housing?: No       Review of Systems:  Skin:        Eyes:       ENT:       Respiratory:       Cardiovascular:       Gastroenterology:      Genitourinary:       Musculoskeletal:       Neurologic:       Psychiatric:       Heme/Lymph/Imm:       Endocrine:         Physical Exam:  Vitals: /72   Pulse 73   Resp 18   Ht 1.676 m (5' 6\")   Wt 104.3 kg (230 lb)   SpO2 100%   BMI 37.12 kg/m   Peak Flow from 09/22/24 1713 to 09/24/24 1713    Date and Time PF Resp   09/24/24 1620 -- 18         Constitutional:           Skin:             Head:           Eyes:           Lymph:      ENT:           Neck:           Respiratory:            Cardiac:                                                           GI:           Extremities and Muscular Skeletal:                 Neurological:           Psych:         Recent Lab Results:  LIPID RESULTS:  Lab Results   Component Value Date    CHOL 190 06/27/2024    CHOL 123 08/24/2020    HDL 48 (L) 06/27/2024    HDL 43 (L) 08/24/2020     06/27/2024    LDL 40 08/24/2020    TRIG 208 (H) 06/27/2024    TRIG 201 (H) 08/24/2020    CHOLHDLRATIO 2.3 10/13/2015       LIVER ENZYME RESULTS:  Lab Results   Component Value Date    AST 14 08/23/2024    AST 12 08/24/2020    ALT 8 08/23/2024    ALT 13 08/24/2020       CBC RESULTS:  Lab Results   Component Value Date    WBC 9.5 09/03/2024    WBC 10.3 02/18/2021    RBC 4.75 09/03/2024    RBC 4.57 02/18/2021    HGB 12.1 09/03/2024    HGB 11.9 02/18/2021    HCT 40.2 09/03/2024    HCT 38.6 02/18/2021    MCV 85 09/03/2024    MCV 85 02/18/2021    MCH 25.5 (L) 09/03/2024    MCH 26.0 (L) 02/18/2021    MCHC 30.1 (L) 09/03/2024    MCHC 30.8 (L) 02/18/2021    RDW 15.8 (H) 09/03/2024    RDW 14.5 02/18/2021     " 09/03/2024     02/18/2021       BMP RESULTS:  Lab Results   Component Value Date     09/19/2024     02/18/2021    POTASSIUM 4.0 09/19/2024    POTASSIUM 4.9 10/24/2022    POTASSIUM 4.2 02/18/2021    CHLORIDE 100 09/19/2024    CHLORIDE 109 10/24/2022    CHLORIDE 108 02/18/2021    CO2 22 09/19/2024    CO2 27 10/24/2022    CO2 22 02/18/2021    ANIONGAP 18 (H) 09/19/2024    ANIONGAP 4 10/24/2022    ANIONGAP 6 02/18/2021     (H) 09/19/2024     (H) 08/28/2024    GLC 80 10/24/2022     (H) 02/18/2021    BUN 43.5 (H) 09/19/2024    BUN 38 (H) 10/24/2022    BUN 34 (H) 02/18/2021    CR 2.11 (H) 09/19/2024    CR 1.50 (H) 02/18/2021    GFRESTIMATED 22 (L) 09/19/2024    GFRESTIMATED 23 (L) 11/25/2023    GFRESTIMATED 31 (L) 02/18/2021    GFRESTBLACK 36 (L) 02/18/2021    TELLY 8.8 09/19/2024    TELLY 8.4 (L) 02/18/2021        A1C RESULTS:  Lab Results   Component Value Date    A1C 7.2 (H) 06/27/2024    A1C 6.2 (H) 08/24/2020       INR RESULTS:  Lab Results   Component Value Date    INR 0.93 07/19/2017    INR 0.99 09/21/2016           CC  Referred Self, MD  No address on file

## 2024-09-24 NOTE — TELEPHONE ENCOUNTER
Patient was seen in clinic for a courtesy wound check.     Incision site does appear to be healing but remains scabbed in 3 separate places. Patient denies drainage, pain, warmth.  Dr. Ortega came to assess. She recommends continued close monitoring with weekly photos until scabs are healed.    Detailed note filled out for patient's rehab, requesting her RN send photos of patient's incision to the [email or web address] email every week for her MD to review. First photo to be sent on 10/1/24.    ADAM BLUE

## 2024-09-24 NOTE — LETTER
9/24/2024    Ani David, YOHANNES CNP  [address] [place] [address]    RE: Moira Andre       Dear Colleague,     I had the pleasure of [facility] the [facility].  HPI and Plan:   I had the pleasure of following up with Ms. Andre today.  This was a most complex visit today and what made it worse was that her ride back to the nursing home came early before we completed.  I spent nearly an hour with her however.  On a positive note today is her 89st birthday    I last saw her in 2020 her history at that point was as follows----------------She had previously placed bare-metal stents to the OM1 and the diagonal. This was in 2 different sittings. She had a followup angiogram in 2013. Fortunately, it was not an acute coronary syndrome and the stents were open and she had her last angiogram in 2017 and again, there was some chest pain. It was not clear what the cause was. The 2017 angiogram shows left main artery normal, LAD had a 10% narrowing proximally and a 20%-30% narrowing in the midportion. The diagonal stent was widely patent. The second diagonal had a 40% narrowing. The left circumflex had a 50%-60% narrowing before the first obtuse marginal. The ramus branch had a stent in it and it was 30% narrowed. The right coronary artery had up to 25% narrowing. Flow wire was checked across the circumflex lesions and it was in the normal range. It was felt that that was a noncardiac problem. That chest pain has not recurred.    As mentioned I last saw her 4 years ago but quite a bit has happened particularly this year.  The reader is referred to her numerous readmissions to the hospital but basically she has had multiple falls and injuries but I do not believe any fractures from a cardiac standpoint she had atrial fibrillation and had more than 1 admission for that this year and she has diastolic heart failure I did review her echocardiograms her ejection fraction is preserved  but she has now at least moderate mitral stenosis and moderate aortic stenosis.  If I understand the story correctly they could not control her atrial fibrillation with escalating dose of beta-blocker so in July of this year she had AV node ablation and VVI pacemaker placed.  She is still on high-dose beta-blocker and her blood pressure is normal at 108/72 so I am going to start decreasing the beta-blocker since we do not need it for rate control and we may not need it for hypertension but we have to keep in mind she has diastolic dysfunction but again this may not be the classic diastolic dysfunction due to myocardial muscle problem it may be the aortic stenosis plus the mitral stenosis plus chronic renal insufficiency all causing fluid retention.  We do not have to worry about tachycardia nearly as much now that she has AV node ablation pacemaker and that may help lessen the hemodynamic consequences of the mitral stenosis in particular    She tells me that since her last discharge in the hospital and she has had several this year she is actually feeling better she is at [facility] and apparently getting good care she denies shortness of breath she has a longstanding history of sleep apnea has not been using the CPAP machine    In the office today she was completely coherent told me it was her birthday remembered all the things that I had done including stenting with her but very unusual is that she has auditory hallucinations she tells me she is currently in the office hearing voices and someone is singing opera.  She states she knows it is not there but she still hears it I did send a note back to her nursing home if they could have  the nurse practitioners review her medicines particularly her depression medications and her pain medications to make sure there is not a problem there    On her exam her lungs are relatively clear her pacer site has a little bit of scabbing but it is healing well it is not infected  her heart exam of course is now regular since she is 100% paced after AV node ablation and VVI pacing and of course there are systolic murmurs I do not quite hear the diastolic murmur mitral stenosis    Her ankles have trivial edema.  Her last creatinine is in the 2 range and that it has been up and down up and down.    At this time I would like to do the following grandma come back in about a month we will get a repeat BMP I am going to decrease her metoprolol from 75 twice daily to 25 twice daily I should also comment she told me she is losing her hair is not clear why but I do know that on occasion beta-blockers can lead to alopecia so I have lowered the dose of the beta-blocker for that plus again I do not think she needs it to control the atrial fibs now that the pacer is in and that she has had the ablation.  Will of course want a watch for fluid retention we have to be a little cautious with the diuretic because of her renal insufficiency but she is always can be at potential risk of congestive heart failure either because of true diastolic dysfunction with muscle relaxation problems of her heart plus her aortic and mitral valve disease    We obviously have some concern she is on NOAC because of atrial fibs but there is a history of recurrent falls with soft tissue injuries so organ have to weigh the risk benefit ratio of that at the next office visit  Today's visit was 45 minutes      Orders Placed This Encounter   Procedures     Basic metabolic panel     Follow-Up with Cardiology AMY     No orders of the defined types were placed in this encounter.    There are no discontinued medications.      Encounter Diagnoses   Name Primary?     Cardiac pacemaker in situ Yes     Acute on chronic congestive heart failure, unspecified heart failure type (H)      Atrial fibrillation with RVR (H)      Chronic kidney disease, stage IV (severe) (H)        CURRENT MEDICATIONS:  Current Outpatient Medications   Medication Sig  Dispense Refill     acetaminophen (TYLENOL) 500 MG tablet Take 2 tablets (1,000 mg) by mouth 3 times daily. 100 tablet 0     albuterol (PROAIR HFA/PROVENTIL HFA/VENTOLIN HFA) 108 (90 Base) MCG/ACT inhaler Inhale 2 puffs into the lungs every 6 hours as needed for shortness of breath, wheezing or cough For shortness of breath       apixaban ANTICOAGULANT (ELIQUIS) 2.5 MG tablet Take 1 tablet (2.5 mg) by mouth 2 times daily 180 tablet 3     atorvastatin (LIPITOR) 20 MG tablet Take 1 tablet (20 mg) by mouth daily for cholesterol 100 tablet 3     blood glucose (ACCU-CHEK GUIDE) test strip Use to test blood sugar once daily or as directed. 100 strip 3     blood glucose (ACCU-CHEK SOFTCLIX) lancing device Lancing device to be used with lancets. 1 each 0     blood glucose monitoring (SOFTCLIX) lancets Use to test blood sugar once daily. 100 each 3     buPROPion (WELLBUTRIN SR) 100 MG 12 hr tablet Take 1 tablet (100 mg) by mouth daily for mood 90 tablet 3     calcium carbonate (TUMS) 500 MG chewable tablet Take 1 chew tab by mouth 2 times daily as needed for heartburn       Cholecalciferol (VITAMIN D) 125 MCG (5000 UT) capsule Take 1 tablet by mouth every evening       DULoxetine (CYMBALTA) 60 MG capsule Take 1 capsule (60 mg) by mouth daily 90 capsule 3     fexofenadine (ALLEGRA) 60 MG tablet Take 60 mg by mouth daily       fluticasone-salmeterol (WIXELA INHUB) 100-50 MCG/ACT inhaler USE 1 INHALATION BY MOUTH EVERY  12 HOURS (Patient taking differently: Inhale 1 puff into the lungs daily. USE 1 INHALATION BY MOUTH EVERY  12 HOURS) 180 each 3     melatonin 3 MG tablet Take 1 tablet (3 mg) by mouth at bedtime.       Menthol, Topical Analgesic, (ICY HOT MEDICATED SPRAY EX) Externally apply topically daily as needed (pain, in neck, back, hand)       methocarbamol (ROBAXIN) 750 MG tablet Take 1 tablet (750 mg) by mouth 4 times daily as needed for muscle spasms.       metoprolol tartrate (LOPRESSOR) 25 MG tablet TAKE 3 TABLETS BY  MOUTH TWICE  DAILY 480 tablet 2     miconazole (MICATIN) 2 % external cream Apply topically 2 times daily as needed for other (rash/irritation)       nitroGLYcerin (NITROSTAT) 0.4 MG sublingual tablet FOR CHEST PAIN PLACE 1 TABLET UNDER THE TONGUE EVERY 5 MINUTES FOR 3 DOSES. IF SYMPTOMS PERSIST 5 MINUTES AFTER 1ST DOSE CALL 911 25 tablet 0     pantoprazole (PROTONIX) 20 MG EC tablet Take 1 tablet (20 mg) by mouth daily 90 tablet 3     senna-docusate (SENOKOT-S/PERICOLACE) 8.6-50 MG tablet Take 1 tablet by mouth 2 times daily. And BID PRN       torsemide (DEMADEX) 20 MG tablet Take 1 tablet (20 mg) by mouth 2 times daily 60 tablet 1     traMADol (ULTRAM) 50 MG tablet Take 1 tablet (50 mg) by mouth 2 times daily as needed for severe pain. 20 tablet 0     triamcinolone (KENALOG) 0.1 % external cream Apply topically 2 times daily as needed for irritation         ALLERGIES     Allergies   Allergen Reactions     Aspirin Hives     Reaction occurred during childhood.      Lidocaine Itching     Lisinopril Cough     Losartan      Hyperkalemia       Metformin      Elevated lactic acid     Minocycline      Yellow Dye Allergy. Minocycline has Yellow Dye #10.     Mounjaro [Tirzepatide] Diarrhea and GI Disturbance     Salicylates Hives     Yellow Dye Hives     Rxn to yellow tablet. Eyes swelled shut.      Yellow Dyes (Non-Tartrazine) Hives       PAST MEDICAL HISTORY:  Past Medical History:   Diagnosis Date     Aortic valve stenosis     moderate     Arrhythmia     PAT, PVC     Aspirin allergy     Plavix use long term     Asthma      Chronic atrial fibrillation (H) 07/2024    failed meds,  s/p AVN ablation and VVI pacer     CKD (chronic kidney disease) stage 3, GFR 30-59 ml/min (H)     x 2007 atleast     Congestive heart failure, unspecified      Depression      Diabetes mellitus (H) 2010     Diastolic dysfunction, left ventricle 2013    grade 2, nl ef     Falls frequently      HTN (hypertension)      Lactic acidosis 08/2018    due  to dehydration and metformin     Migraine headache      Mitral stenosis     moderate MS  mac     Myocardial infarction (H) 2007, cath 2013 ml    BMS: stent to OM, diag, nl EF, echo /C angia 2013 , f/u cath no lesion >40%     Nephrolithiasis     right side     OA (osteoarthritis) of knee      Obesity      Rheumatoid arthritis flare (H)     prednisone     Sleep apnea     restarted using cpap      TIA (transient ischaemic attack)      Ventral hernia, unspecified, without mention of obstruction or gangrene        PAST SURGICAL HISTORY:  Past Surgical History:   Procedure Laterality Date     APPENDECTOMY       BIOPSY BREAST      x2 -needle & lumpectomy-benign     CHOLECYSTECTOMY       CORONARY ANGIOGRAPHY ADULT ORDER  2007    Bare metal stent to OM1, Diagonal patent      CORONARY ANGIOGRAPHY ADULT ORDER  2007    Herndon stent to Diagonal     EP ABLATION AV NODE N/A 2024    Procedure: Ablation Atrioventricular Node;  Surgeon: Davion Baron MD;  Location:  HEART CARDI[facility] CATH LAB     EP PACEMAKER DEVICE & LEAD IMPLANT- RIGHT VENTRICULAR N/A 2024    Procedure: Pacemaker Device & Lead Implant- Right ventricular;  Surgeon: Davion Baron MD;  Location:  HEART CARDI[facility] CATH LAB     HC LEFT HEART CATHETERIZATION  2013    Moderate CAD     HYSTERECTOMY TOTAL ABDOMINAL       ORTHOPEDIC SURGERY      knee replacement on right side (), Left side ()     RELEASE CARPAL TUNNEL      right and left     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       FAMILY HISTORY:  Family History   Problem Relation Age of Onset     Neurologic Disorder Mother         MS - at 60's     C.A.D. Father          at 8o's, ? prostate ca     Breast Cancer No family hx of      Cancer - colorectal No family hx of        SOCIAL HISTORY:  Social History     Socioeconomic History     Marital status:    Tobacco Use     Smoking status: Former     Current packs/day: 0.00     Average packs/day: 0.3  packs/day for 1.3 years (0.3 ttl pk-yrs)     Types: Cigarettes     Start date:      Quit date: 1973     Years since quittin.4     Smokeless tobacco: Never   Vaping Use     Vaping status: Never Used   Substance and Sexual Activity     Alcohol use: Yes     Alcohol/week: 0.0 - 1.0 standard drinks of alcohol     Comment: rarely     Drug use: No     Sexual activity: Not Currently     Partners: Male   Other Topics Concern     Caffeine Concern No     Sleep Concern No     Stress Concern No     Weight Concern No     Special Diet No     Exercise Yes     Comment: walking, swimming x2 a week     Seat Belt Yes   Social History Narrative    , 1 step son    Work- clown for profession        Tobacco- none,smoked for couple months in     ETOH- occ 1/2 months    Diet coke- 2-3 cans/day    Exercise- swims 8 laps -3/week             Social Determinants of Health     Financial Resource Strain: Low Risk  (2024)    Financial Resource Strain      Within the past 12 months, have you or your family members you live with been unable to get utilities (heat, electricity) when it was really needed?: No   Food Insecurity: Low Risk  (2024)    Food Insecurity      Within the past 12 months, did you worry that your food would run out before you got money to buy more?: No      Within the past 12 months, did the food you bought just not last and you didn t have money to get more?: No   Transportation Needs: Low Risk  (2024)    Transportation Needs      Within the past 12 months, has lack of transportation kept you from medical appointments, getting your medicines, non-medical meetings or appointments, work, or from getting things that you need?: No   Stress: No Stress Concern Present (6/10/2021)    [employer] - Occupational Stress Questionnaire      Feeling of Stress : Not at all   Social Connections: Unknown (6/10/2021)    Social Connection and Isolation Panel [NHANES]       "Marital Status:    Interpersonal Safety: Low Risk  (1/11/2024)    Interpersonal Safety      Do you feel physically and emotionally safe where you currently live?: Yes      Within the past 12 months, have you been hit, slapped, kicked or otherwise physically hurt by someone?: No      Within the past 12 months, have you been humiliated or emotionally abused in other ways by your partner or ex-partner?: No   Housing Stability: Low Risk  (8/24/2024)    Housing Stability      Do you have housing? : Yes      Are you worried about losing your housing?: No       Review of Systems:  Skin:        Eyes:       ENT:       Respiratory:       Cardiovascular:       Gastroenterology:      Genitourinary:       Musculoskeletal:       Neurologic:       Psychiatric:       Heme/Lymph/Imm:       Endocrine:         Physical Exam:  Vitals: /72   Pulse 73   Resp 18   Ht 1.676 m (5' 6\")   Wt 104.3 kg (230 lb)   SpO2 100%   BMI 37.12 kg/m   Peak Flow from 09/22/24 1713 to 09/24/24 1713    Date and Time PF Resp   09/24/24 1620 -- 18         Constitutional:           Skin:             Head:           Eyes:           Lymph:      ENT:           Neck:           Respiratory:            Cardiac:                                                           GI:           Extremities and Muscular Skeletal:                 Neurological:           Psych:         Recent Lab Results:  LIPID RESULTS:  Lab Results   Component Value Date    CHOL 190 06/27/2024    CHOL 123 08/24/2020    HDL 48 (L) 06/27/2024    HDL 43 (L) 08/24/2020     06/27/2024    LDL 40 08/24/2020    TRIG 208 (H) 06/27/2024    TRIG 201 (H) 08/24/2020    CHOLHDLRATIO 2.3 10/13/2015       LIVER ENZYME RESULTS:  Lab Results   Component Value Date    AST 14 08/23/2024    AST 12 08/24/2020    ALT 8 08/23/2024    ALT 13 08/24/2020       CBC RESULTS:  Lab Results   Component Value Date    WBC 9.5 09/03/2024    WBC 10.3 02/18/2021    RBC 4.75 09/03/2024    RBC 4.57 " 02/18/2021    HGB 12.1 09/03/2024    HGB 11.9 02/18/2021    HCT 40.2 09/03/2024    HCT 38.6 02/18/2021    MCV 85 09/03/2024    MCV 85 02/18/2021    MCH 25.5 (L) 09/03/2024    MCH 26.0 (L) 02/18/2021    MCHC 30.1 (L) 09/03/2024    MCHC 30.8 (L) 02/18/2021    RDW 15.8 (H) 09/03/2024    RDW 14.5 02/18/2021     09/03/2024     02/18/2021       BMP RESULTS:  Lab Results   Component Value Date     09/19/2024     02/18/2021    POTASSIUM 4.0 09/19/2024    POTASSIUM 4.9 10/24/2022    POTASSIUM 4.2 02/18/2021    CHLORIDE 100 09/19/2024    CHLORIDE 109 10/24/2022    CHLORIDE 108 02/18/2021    CO2 22 09/19/2024    CO2 27 10/24/2022    CO2 22 02/18/2021    ANIONGAP 18 (H) 09/19/2024    ANIONGAP 4 10/24/2022    ANIONGAP 6 02/18/2021     (H) 09/19/2024     (H) 08/28/2024    GLC 80 10/24/2022     (H) 02/18/2021    BUN 43.5 (H) 09/19/2024    BUN 38 (H) 10/24/2022    BUN 34 (H) 02/18/2021    CR 2.11 (H) 09/19/2024    CR 1.50 (H) 02/18/2021    GFRESTIMATED 22 (L) 09/19/2024    GFRESTIMATED 23 (L) 11/25/2023    GFRESTIMATED 31 (L) 02/18/2021    GFRESTBLACK 36 (L) 02/18/2021    TELLY 8.8 09/19/2024    TELLY 8.4 (L) 02/18/2021        A1C RESULTS:  Lab Results   Component Value Date    A1C 7.2 (H) 06/27/2024    A1C 6.2 (H) 08/24/2020       INR RESULTS:  Lab Results   Component Value Date    INR 0.93 07/19/2017    INR 0.99 09/21/2016           CC  Referred Self, MD  No address on file      Thank you for allowing me to participate in the care of your patient.      Sincerely,     Jozef Sinha MD     [facility]  cc:   Referred MD Sarwat  No address on file

## 2024-09-25 ENCOUNTER — NURSING HOME VISIT (OUTPATIENT)
Dept: GERIATRICS | Facility: CLINIC | Age: 89
End: 2024-09-25
Payer: COMMERCIAL

## 2024-09-25 VITALS
SYSTOLIC BLOOD PRESSURE: 129 MMHG | HEART RATE: 74 BPM | RESPIRATION RATE: 18 BRPM | DIASTOLIC BLOOD PRESSURE: 84 MMHG | WEIGHT: 231 LBS | TEMPERATURE: 97.6 F | HEIGHT: 67 IN | OXYGEN SATURATION: 95 % | BODY MASS INDEX: 36.26 KG/M2

## 2024-09-25 DIAGNOSIS — H91.92 CHANGE IN HEARING OF LEFT EAR: Primary | ICD-10-CM

## 2024-09-25 DIAGNOSIS — F32.1 MODERATE MAJOR DEPRESSION (H): ICD-10-CM

## 2024-09-25 DIAGNOSIS — R41.89 COGNITIVE IMPAIRMENT: ICD-10-CM

## 2024-09-25 LAB
ANION GAP SERPL CALCULATED.3IONS-SCNC: 17 MMOL/L (ref 7–15)
BUN SERPL-MCNC: 35.2 MG/DL (ref 8–23)
CALCIUM SERPL-MCNC: 9.1 MG/DL (ref 8.8–10.4)
CHLORIDE SERPL-SCNC: 99 MMOL/L (ref 98–107)
CREAT SERPL-MCNC: 2.11 MG/DL (ref 0.51–0.95)
EGFRCR SERPLBLD CKD-EPI 2021: 22 ML/MIN/1.73M2
GLUCOSE SERPL-MCNC: 119 MG/DL (ref 70–99)
HCO3 SERPL-SCNC: 24 MMOL/L (ref 22–29)
MDC_IDC_LEAD_CONNECTION_STATUS: NORMAL
MDC_IDC_LEAD_IMPLANT_DT: NORMAL
MDC_IDC_LEAD_LOCATION: NORMAL
MDC_IDC_LEAD_LOCATION_DETAIL_1: NORMAL
MDC_IDC_LEAD_MFG: NORMAL
MDC_IDC_LEAD_MODEL: NORMAL
MDC_IDC_LEAD_POLARITY_TYPE: NORMAL
MDC_IDC_LEAD_SERIAL: NORMAL
MDC_IDC_PG_IMPLANT_DTM: NORMAL
MDC_IDC_PG_MFG: NORMAL
MDC_IDC_PG_MODEL: NORMAL
MDC_IDC_PG_SERIAL: NORMAL
MDC_IDC_PG_TYPE: NORMAL
MDC_IDC_SESS_CLINIC_NAME: NORMAL
MDC_IDC_SESS_DTM: NORMAL
MDC_IDC_SESS_TYPE: NORMAL
POTASSIUM SERPL-SCNC: 4 MMOL/L (ref 3.4–5.3)
SODIUM SERPL-SCNC: 140 MMOL/L (ref 135–145)

## 2024-09-25 PROCEDURE — 80048 BASIC METABOLIC PNL TOTAL CA: CPT

## 2024-09-25 PROCEDURE — 99309 SBSQ NF CARE MODERATE MDM 30: CPT

## 2024-09-25 PROCEDURE — 82947 ASSAY GLUCOSE BLOOD QUANT: CPT

## 2024-09-25 PROCEDURE — P9603 ONE-WAY ALLOW PRORATED MILES: HCPCS

## 2024-09-25 PROCEDURE — 36415 COLL VENOUS BLD VENIPUNCTURE: CPT | Mod: ORL

## 2024-09-25 NOTE — PATIENT INSTRUCTIONS
Gabrielle Andre  9/24/1933     Discontinue fexofenadine  Decrease duloxetine to 40 mg daily   Decrease methocarbamol to 750 mg BID PRN  Discontinue tramadol       YOHANNES Charles CNP on 9/25/2024 at 12:49 PM

## 2024-09-25 NOTE — LETTER
" 9/25/2024      Moira Andre  [address]  [facility] [address]        [facility]    Chief Complaint   Patient presents with     RECHECK     HPI:  Moira Andre is a 89 year old  (9/24/1933), who is being seen today for an episodic care visit at: [facility]  () [[id number]]. Today's concern is: Seen today for follow-up on report of \"hearing voices.\" Per cardiology follow-up with Dr. Sinha yesterday, patient reported hearing voices in the office and someone singing opera concerning for auditory hallucinations. Seen today up on the unit in LT. She reports she is having more right hip and knee pain, and wants approval to follow-up with pain clinic for an injection which she has received in the past. She is struggling a bit to adjust to her new setting, living with a roommate, etc. She does not bring up the concern about auditory hallucinations, but does endorse she hears music almost constantly \"when it's quiet.\" She denies hearing people talking - only singing. The sound seems to originate just outside her left ear. Seems to have trouble discerning when it is real music, but noticed it continued the other day when she went outside. She asked her daughter if she could hear it and her daughter said no. It is usually the [employer] singing hymns. This has been going on for about three weeks. She denies CP, SOB, changes in b/b habits, fever/chills, changes in vision.     Allergies, and PMH/PSH reviewed in [facility] today.  REVIEW OF SYSTEMS:  Limited secondary to cognitive impairment but today pt reports the above and 4 point ROS including Respiratory, CV, GI and , other than that noted in the HPI,  is negative    Objective:   /84   Pulse 74   Temp 97.6  F (36.4  C)   Resp 18   Ht 1.702 m (5' 7\")   Wt 104.8 kg (231 lb)   SpO2 95%   BMI 36.18 kg/m    GENERAL APPEARANCE:  Alert, in no distress  RESP:  respiratory effort and palpation of chest normal, lungs " clear to auscultation , no respiratory distress  CV:  regular rate and rhythm, no murmur, rub, or gallop, no edema  ABDOMEN:  normal bowel sounds, soft, nontender, no hepatosplenomegaly or other masses  M/S:   Gait and station abnormal transfers with assist, wheelchair for mobility  SKIN:  Inspection of skin and subcutaneous tissue baseline, Palpation of skin and subcutaneous tissue baseline  NEURO:   matthews freely, follows commands  PSYCH:  oriented X 3, forgetful, affect and mood appropriate for situation, tearful when discussing life changes    Labs done in SNF are in [facility]. Please refer to them using EP[facility]/Care Everywhere. and Recent labs in EPIC reviewed by me today.     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 She reported hearing voices speaking in clinic yesterday but denies this today. Favor musical ear syndrome vs less likely auditory hallucinations at this point. She does have cognitive impairment, no formal dementia diagnosis. Medications reviewed, she has used methocarbamol x 1 in the last week and no recent use of tramadol, will stop tramadol and reduce methocarbamol. Will also stop fexofenadine which is known to cause visual hallucinations. Decrease duloxetine to 40 mg daily - she has previously been tolerating 60 mg dose but not recommended with her kidney function. May need an alternative agent for depression and note she was tearful today when discussing recent life changes.   Plan: discontinue fexofenadine, tramadol. Decrease duloxetine to 40 mg daily. Decrease methocarbamol to 750 mg BID PRN. Monitor pain, mood, behavior. Continue wellbutrin.       MED REC REQUIRED  Post Medication Reconciliation Status: discharge medications reconciled and changed, per note/orders      Electronically  signed by: YOHANNES Charles CNP         Sincerely,        YOHANNES Charles CNP

## 2024-09-25 NOTE — PROGRESS NOTES
"[facility] GERIATRICS    Chief Complaint   Patient presents with    RECHECK     HPI:  Moira Andre is a 89 year old  (9/24/1933), who is being seen today for an episodic care visit at: [facility]  () [[id number]]. Today's concern is: Seen today for follow-up on report of \"hearing voices.\" Per cardiology follow-up with Dr. Sinha yesterday, patient reported hearing voices in the office and someone singing opera concerning for auditory hallucinations. Seen today up on the unit in LTC. She reports she is having more right hip and knee pain, and wants approval to follow-up with pain clinic for an injection which she has received in the past. She is struggling a bit to adjust to her new setting, living with a roommate, etc. She does not bring up the concern about auditory hallucinations, but does endorse she hears music almost constantly \"when it's quiet.\" She denies hearing people talking - only singing. The sound seems to originate just outside her left ear. Seems to have trouble discerning when it is real music, but noticed it continued the other day when she went outside. She asked her daughter if she could hear it and her daughter said no. It is usually the [employer] singing hymns. This has been going on for about three weeks. She denies CP, SOB, changes in b/b habits, fever/chills, changes in vision.     Allergies, and PMH/PSH reviewed in EPIC today.  REVIEW OF SYSTEMS:  Limited secondary to cognitive impairment but today pt reports the above and 4 point ROS including Respiratory, CV, GI and , other than that noted in the HPI,  is negative    Objective:   /84   Pulse 74   Temp 97.6  F (36.4  C)   Resp 18   Ht 1.702 m (5' 7\")   Wt 104.8 kg (231 lb)   SpO2 95%   BMI 36.18 kg/m    GENERAL APPEARANCE:  Alert, in no distress  RESP:  respiratory effort and palpation of chest normal, lungs clear to auscultation , no respiratory distress  CV:  regular rate and rhythm, no murmur, " rub, or gallop, no edema  ABDOMEN:  normal bowel sounds, soft, nontender, no hepatosplenomegaly or other masses  M/S:   Gait and station abnormal transfers with assist, wheelchair for mobility  SKIN:  Inspection of skin and subcutaneous tissue baseline, Palpation of skin and subcutaneous tissue baseline  NEURO:   matthews freely, follows commands  PSYCH:  oriented X 3, forgetful, affect and mood appropriate for situation, tearful when discussing life changes    [facility] in [facility]. Please refer to them using [facility] Everywhere. and Recent labs in [facility] reviewed by me today.     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 She reported hearing voices speaking in clinic yesterday but denies this today. Favor musical ear syndrome vs less likely auditory hallucinations at this point. She does have cognitive impairment, no formal dementia diagnosis. Medications reviewed, she has used methocarbamol x 1 in the last week and no recent use of tramadol, will stop tramadol and reduce methocarbamol. Will also stop fexofenadine which is known to cause visual hallucinations. Decrease duloxetine to 40 mg daily - she has previously been tolerating 60 mg dose but not recommended with her kidney function. May need an alternative agent for depression and note she was tearful today when discussing recent life changes.   Plan: discontinue fexofenadine, tramadol. Decrease duloxetine to 40 mg daily. Decrease methocarbamol to 750 mg BID PRN. Monitor pain, mood, behavior. Continue wellbutrin.       MED REC REQUIRED  Post Medication Reconciliation Status: discharge medications reconciled and changed, per note/orders      Electronically signed by: YOHANNES Charles CNP

## 2024-09-26 ENCOUNTER — DOCUMENTATION ONLY (OUTPATIENT)
Dept: OTHER | Facility: CLINIC | Age: 89
End: 2024-09-26
Payer: COMMERCIAL

## 2024-09-30 ENCOUNTER — NURSING HOME VISIT (OUTPATIENT)
Dept: GERIATRICS | Facility: CLINIC | Age: 89
End: 2024-09-30
Payer: COMMERCIAL

## 2024-09-30 VITALS
BODY MASS INDEX: 36 KG/M2 | HEART RATE: 69 BPM | OXYGEN SATURATION: 96 % | HEIGHT: 67 IN | SYSTOLIC BLOOD PRESSURE: 124 MMHG | DIASTOLIC BLOOD PRESSURE: 81 MMHG | RESPIRATION RATE: 18 BRPM | WEIGHT: 229.4 LBS | TEMPERATURE: 97.9 F

## 2024-09-30 DIAGNOSIS — R41.89 COGNITIVE IMPAIRMENT: ICD-10-CM

## 2024-09-30 DIAGNOSIS — F41.9 ANXIETY: ICD-10-CM

## 2024-09-30 DIAGNOSIS — I10 ESSENTIAL HYPERTENSION: Primary | ICD-10-CM

## 2024-09-30 DIAGNOSIS — E11.9 DIET-CONTROLLED TYPE 2 DIABETES MELLITUS (H): ICD-10-CM

## 2024-09-30 DIAGNOSIS — R53.81 PHYSICAL DECONDITIONING: ICD-10-CM

## 2024-09-30 DIAGNOSIS — F32.A DEPRESSION, UNSPECIFIED DEPRESSION TYPE: ICD-10-CM

## 2024-09-30 DIAGNOSIS — I48.91 ATRIAL FIBRILLATION WITH RVR (H): ICD-10-CM

## 2024-09-30 DIAGNOSIS — G47.33 OSA (OBSTRUCTIVE SLEEP APNEA): ICD-10-CM

## 2024-09-30 DIAGNOSIS — J45.20 INTERMITTENT ASTHMA WITHOUT COMPLICATION, UNSPECIFIED ASTHMA SEVERITY: ICD-10-CM

## 2024-09-30 DIAGNOSIS — I50.32 CHRONIC HEART FAILURE WITH PRESERVED EJECTION FRACTION (HFPEF) (H): ICD-10-CM

## 2024-09-30 DIAGNOSIS — N18.4 CKD (CHRONIC KIDNEY DISEASE) STAGE 4, GFR 15-29 ML/MIN (H): ICD-10-CM

## 2024-09-30 PROCEDURE — 99305 1ST NF CARE MODERATE MDM 35: CPT | Performed by: INTERNAL MEDICINE

## 2024-09-30 NOTE — LETTER
9/30/2024      Moira Andre  [address]  [facility] [id number]        No notes on file      Sincerely,        Halima Mayorga MD           Treated with abx for a UTI and early RLE cellulitis, then later for a slow-healing pacemaker site with infection.     Family reported worsening cognition and pt not safe to live at home alone    [facility], then returned home later in August    She was home from TCU for 4 days before she was again hospitalized at [facility] for recurrent falls.  Hit her head and unable to get up.   MRI of the right hip showed severe arthritis but not fracture.  [facility]       Past Medical History:   Diagnosis Date     Aortic valve stenosis     moderate     Arrhythmia     PAT, PVC     Aspirin allergy     Plavix use long term     Asthma      Chronic atrial fibrillation (H) 07/2024    failed meds,  s/p AVN ablation and VVI pacer     CKD (chronic kidney disease) stage 3, GFR 30-59 ml/min (H)     x 2007 atleast     Congestive heart failure, unspecified      Depression      Diabetes mellitus (H) 2010     Diastolic dysfunction, left ventricle 2013    grade 2, nl ef     Falls frequently      HTN (hypertension)      Lactic acidosis 08/2018    due to dehydration and metformin     Migraine headache      Mitral stenosis     moderate MS  mac     Myocardial infarction (H) 9/2007, cath 2013 ml    BMS: stent to OM, diag, nl EF, echo /C angia 2013 , f/u cath no lesion >40%     Nephrolithiasis     right side     OA (osteoarthritis) of knee      Obesity      Rheumatoid arthritis flare (H)     prednisone     Sleep apnea     restarted using cpap 2017     TIA (transient ischaemic attack)      Ventral hernia, unspecified, without mention of obstruction or gangrene        Past Surgical History:   Procedure Laterality Date     APPENDECTOMY       BIOPSY BREAST      x2 -needle & lumpectomy-benign     CHOLECYSTECTOMY       CORONARY ANGIOGRAPHY ADULT ORDER  9/28/2007    Bare metal stent to OM1, Diagonal patent      CORONARY ANGIOGRAPHY ADULT ORDER  9/25/2007    [place] stent to Diagonal  "    EP ABLATION AV NODE N/A 2024    Procedure: Ablation Atrioventricular Node;  Surgeon: Davion Baron MD;  Location:  HEART CARDIAC CATH LAB     EP PACEMAKER DEVICE & LEAD IMPLANT- RIGHT VENTRICULAR N/A 2024    Procedure: Pacemaker Device & Lead Implant- Right ventricular;  Surgeon: Davion Baron MD;  Location:  HEART CARDIAC CATH LAB     HC LEFT HEART CATHETERIZATION  2013    Moderate CAD     HYSTERECTOMY TOTAL ABDOMINAL       ORTHOPEDIC SURGERY      knee replacement on right side (), Left side ()     RELEASE CARPAL TUNNEL      right and left     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       Family History   Problem Relation Age of Onset     Neurologic Disorder Mother         MS - at 60's     C.A.D. Father          at 8o's, ? prostate ca     Breast Cancer No family hx of      Cancer - colorectal No family hx of        Social History     Tobacco Use     Smoking status: Former     Current packs/day: 0.00     Average packs/day: 0.3 packs/day for 1.3 years (0.3 ttl pk-yrs)     Types: Cigarettes     Start date:      Quit date: 1973     Years since quittin.4     Smokeless tobacco: Never   Substance Use Topics     Alcohol use: Yes     Alcohol/week: 0.0 - 1.0 standard drinks of alcohol     Comment: rarely      SH: Previously lived alone, [place] apartment in [place]   Has a daughter Sophia     Review Of Systems  SLUMS    CPT 4.9  WC bound with assist for transfers  Frequent falls, none since in LTC    Eating okay and weight is stable     GENERAL APPEARANCE: alert and no distress  /81   Pulse 69   Temp 97.9  F (36.6  C)   Resp 18   Ht 1.702 m (5' 7\")   Wt 104.1 kg (229 lb 6.4 oz)   SpO2 96%   BMI 35.93 kg/m     HEENT: normocephalic, no lesion or abnormalities  NECK: no adenopathy, no asymmetry, masses, or scars and thyroid normal to palpation  RESP: lungs clear to auscultation - no rales, rhonchi or wheezes  CV: irregular rate and rhythm, " normal S1 S2, II/VI SANGEETHA  ABDOMEN: obese, soft, nontender, no HSM or masses and bowel sounds normal  MS: extremities normal, with trace extremity edema   SKIN: no suspicious lesions or rashes  NEURO: Normal strength and tone, sensory exam grossly normal, and speech normal, WC bound   PSYCH: affect okay, pleasant   LYMPHATICS: No cervical,  or supraclavicular nodes     Lab Results   Component Value Date     09/25/2024    POTASSIUM 4.0 09/25/2024    CHLORIDE 99 09/25/2024    CO2 24 09/25/2024    ANIONGAP 17 (H) 09/25/2024     (H) 09/25/2024    BUN 35.2 (H) 09/25/2024    CR 2.11 (H) 09/25/2024    GFRESTIMATED 22 (L) 09/25/2024    TELLY 9.1 09/25/2024     Lab Results   Component Value Date    AST 14 08/23/2024      ALBUMIN 4.1 08/23/2024      ALKPHOS 80 08/23/2024     Lab Results   Component Value Date    WBC 9.5 09/03/2024      HGB 12.1 09/03/2024      MCV 85 09/03/2024       09/03/2024     TSH   Date Value Ref Range Status   08/23/2024 2.01 0.30 - 4.20 uIU/mL Final   10/24/2022 1.98 0.40 - 4.00 mU/L Final   08/24/2020 1.39 0.40 - 4.00 mU/L Final          IMP/PLAN:   (I48.91) Atrial fibrillation with rapid ventricular response (H)  Comment: s/p AV ablation and pacemaker placement in July   Pulse Readings from Last 4 Encounters:   09/30/24 69   09/25/24 74   09/24/24 73   09/12/24 71      Plan: metoprolol 75 mg bid for VR control   Apixaban 2.5 mg bid for stroke prophylaxis   Cardiology follow up as scheduled     (I10) Essential hypertension   (N18.4) CKD (chronic kidney disease) stage 4, GFR 15-29 ml/min (H)  Comment:   BP Readings from Last 3 Encounters:   09/30/24 124/81   09/25/24 129/84   09/24/24 108/72      Plan: metoprolol 75 mg bid  Renal dosing of medications      (I50.33) Acute on chronic heart failure with preserved ejection fraction (H)  (I25.10) Coronary artery disease involving native coronary artery of native heart without angina pectoris  Comment: multiple cardiac related  hospitalizations over the past year     Plan: atorvastatin 20 mg/day for secondary prevention  Torsemide 20 mg bid - monitor weights and exam   NTG prn    (G47.33) ELIGIO on CPAP  (E66.2) Obesity hypoventilation syndrome (H)  Plan: not currently using her CPAP  Has Sleep Clinic appointment upcoming      (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   She is also on Flonase NS and fexofenadine 60 mg/HS, would change the latter to PRN     (F41.9) Anxiety  Comment: and depression, by history   Plan: remains on PTA duloxetine 60 mg/day and bupropion 100 mg/day     Musical ear syndrome   Comment: just started recently   Plan: may be med related, difficult to resolve   Would start with Audiology referral for a hearing test     Cognitive impairment   (R53.81) Physical deconditioning  Comment: after acute illnesses, pt unable to continue to [place].    Plan: PHYSICAL THERAPY / OCCUPATIONAL THERAPY for strengthening, balance, gait, ADLs.   Discharge goal is transition to AL given higher care needs, awaiting Elderly Waiver      Halima Mayorga MD       Sincerely,        Halima Mayorga MD

## 2024-09-30 NOTE — PROGRESS NOTES
Moira Andre is a 89 year old female seen September 30, 2024 at [facility] TCU where she was admitted after [facility] hospitalization 11/25-29 for right kidney stone, right pyelonephritis and sepsis.   UC +for a pansensitive E coli.    She was also found to be in atrial fib with RVR, needed IVF for hypotension and was treated with ceftriaxone /Ceftin.  Right flank pain thought to be musculoskeletal and workup of nephrolithiasis deferred to outpatient follow up    Pt improved and discharged to TCU for ongoing recovery and Rehab.     Pt had a prior [facility] hospitalization in October with new dx of atrial fib, with pulmonary edema and hypoxic respiratory failure due to CHF exacerbation.  She was started on furosemide, low dose metoprolol and apixaban at that time      Past Medical History:   Diagnosis Date    Aortic valve stenosis     moderate    Arrhythmia     PAT, PVC    Aspirin allergy     Plavix use long term    Asthma     Chronic atrial fibrillation (H) 07/2024    failed meds,  s/p AVN ablation and VVI pacer    CKD (chronic kidney disease) stage 3, GFR 30-59 ml/min (H)     x 2007 atleast    Congestive heart failure, unspecified     Depression     Diabetes mellitus (H) 2010    Diastolic dysfunction, left ventricle 2013    grade 2, nl ef    Falls frequently     HTN (hypertension)     Lactic acidosis 08/2018    due to dehydration and metformin    Migraine headache     Mitral stenosis     moderate MS  mac    Myocardial infarction (H) 9/2007, cath 2013 ml    BMS: stent to OM, diag, nl EF, echo /C angia 2013 , f/u cath no lesion >40%    Nephrolithiasis     right side    OA (osteoarthritis) of knee     Obesity     Rheumatoid arthritis flare (H)     prednisone    Sleep apnea     restarted using cpap 2017    TIA (transient ischaemic attack)     Ventral hernia, unspecified, without mention of obstruction or gangrene        Past Surgical History:   Procedure Laterality Date     APPENDECTOMY      BIOPSY BREAST      x2 -needle & lumpectomy-benign    CHOLECYSTECTOMY      CORONARY ANGIOGRAPHY ADULT ORDER  2007    Bare metal stent to OM1, Diagonal patent     CORONARY ANGIOGRAPHY ADULT ORDER  2007    [place] stent to Diagonal    EP ABLATION AV NODE N/A 2024    Procedure: Ablation Atrioventricular Node;  Surgeon: Davion Baron MD;  Location:  HEART CARDIAC CATH LAB    EP PACEMAKER DEVICE & LEAD IMPLANT- RIGHT VENTRICULAR N/A 2024    Procedure: Pacemaker Device & Lead Implant- Right ventricular;  Surgeon: Davion Baron MD;  Location:  HEART CARDIAC CATH LAB    HC LEFT HEART CATHETERIZATION  2013    Moderate CAD    HYSTERECTOMY TOTAL ABDOMINAL      ORTHOPEDIC SURGERY      knee replacement on right side (), Left side ()    RELEASE CARPAL TUNNEL      right and left    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       Family History   Problem Relation Age of Onset    Neurologic Disorder Mother         MS - at 60's    C.A.D. Father          at 8o's, ? prostate ca    Breast Cancer No family hx of     Cancer - colorectal No family hx of        Social History     Tobacco Use    Smoking status: Former     Current packs/day: 0.00     Average packs/day: 0.3 packs/day for 1.3 years (0.3 ttl pk-yrs)     Types: Cigarettes     Start date:      Quit date: 1973     Years since quittin.4    Smokeless tobacco: Never   Substance Use Topics    Alcohol use: Yes     Alcohol/week: 0.0 - 1.0 standard drinks of alcohol     Comment: rarely      SH: Lives alone, [place] apartment in [place]   Has a daughter Sophia     Review Of Systems  Skin: negative   Eyes: impaired vision, glasses  Ears/Nose/Throat: hearing loss  Respiratory: + dyspnea on exertion,  Cardiovascular: exercise intolerance  Gastrointestinal: poor appetite, not eating as well as usual     Genitourinary: as above  Musculoskeletal: assist of one person for transfers out of bed, SBA for  "dressing, min assist for bathing   Ambulatory with walker and physical therapist assist       Neurologic: negative  Psychiatric: anxiety and depression   Hematologic/Lymphatic/Immunologic: negative  Endocrine: negative      GENERAL APPEARANCE: alert and no distress, very fatigued  /81   Pulse 69   Temp 97.9  F (36.6  C)   Resp 18   Ht 1.702 m (5' 7\")   Wt 104.1 kg (229 lb 6.4 oz)   SpO2 96%   BMI 35.93 kg/m     HEENT: normocephalic, no lesion or abnormalities  NECK: no adenopathy, no asymmetry, masses, or scars and thyroid normal to palpation  RESP: lungs clear to auscultation - no rales, rhonchi or wheezes  CV: regular rate and rhythm, normal S1 S2  ABDOMEN: obese, soft, nontender, no HSM or masses and bowel sounds normal  MS: extremities normal- no extremity edema   SKIN: no suspicious lesions or rashes  NEURO: Normal strength and tone, sensory exam grossly normal, and speech normal  PSYCH: affect okay, pleasant   LYMPHATICS: No cervical,  or supraclavicular nodes     Lab Results   Component Value Date     09/25/2024    POTASSIUM 4.0 09/25/2024    CHLORIDE 99 09/25/2024    CO2 24 09/25/2024    ANIONGAP 17 (H) 09/25/2024     (H) 09/25/2024    BUN 35.2 (H) 09/25/2024    CR 2.11 (H) 09/25/2024    GFRESTIMATED 22 (L) 09/25/2024    TELLY 9.1 09/25/2024     Lab Results   Component Value Date    AST 15 11/25/2023      ALBUMIN 4.2 11/25/2023      ALKPHOS 83 11/25/2023     Lab Results   Component Value Date    WBC 10.1 11/27/2023      HGB 12.1 11/27/2023      MCV 87 11/27/2023       11/27/2023     TSH   Date Value Ref Range Status   08/23/2024 2.01 0.30 - 4.20 uIU/mL Final   10/24/2022 1.98 0.40 - 4.00 mU/L Final   08/24/2020 1.39 0.40 - 4.00 mU/L Final      ECHO 10/12/2023   1. The left ventricle is normal in structure, function and size. The visual ejection fraction is estimated at 55%.  2. The right ventricle is normal in structure, function and size.  3. There is moderate mitral stenosis. " The mean mitral valve gradient is 6mmHg.  4. Moderate valvular aortic stenosis. Mean 20mmHg, Vmax 2.7m/s, MEL 1.2cm2, DI 0.40.  Echo 11/2020 showed EF 60%     CT ABDOMEN PELVIS W/O CONTRAST  11/26/2023  LOWER CHEST: Mild scattered atelectasis. No consolidation or pleural effusion.  KIDNEYS/BLADDER: Multiple bilateral renal cysts measuring up to 4.8 cm in the right kidney appears benign and do not require follow-up. A 1 x 0.7 cm stone is seen in the right renal pelvis with marked adjacent fat stranding. No right hydronephrosis. A 1   mm nonobstructing stones also seen in the right kidney. No left hydroureteronephrosis. Bladder is decompressed and grossly unremarkable.  BOWEL: Colonic diverticulosis without acute diverticulitis. No focal bowel thickening or obstruction.  VASCULATURE: Mild aortoiliac atherosclerotic calcifications. No abdominal aortic aneurysm.  PELVIC ORGANS: Status post hysterectomy. No abnormal adnexal masses.  MUSCULOSKELETAL: Multilevel mild and moderate degenerative disc space narrowing with vacuum disc in the lower thoracic and lumbar spine. No suspicious osseous lesions or acute fractures. A large ventral abdominal hernia is present containing   nonobstructed colon and small bowel                                                             IMPRESSION:   1.  1 cm stone in the right renal pelvis with marked adjacent fat stranding indicating superimposed infection. Recommend correlation with urinalysis. No hydronephrosis.  2.  Additional 1 mm nonobstructing stone in the right kidney.  3.  Large ventral abdominal hernia containing nonobstructed colon and small bowel.       IMP/PLAN:   (N20.0) Nephrolithiasis  (primary encounter diagnosis)  (N12) Pyelonephritis  Comment: with sepsis, pansensitive E coli UTI    Plan: finish course of po Ceftin, monitor for any recurrent symptoms  Urology outpatient follow up to address stones        (I48.91) Atrial fibrillation with rapid ventricular response  (H)  Comment:   Pulse Readings from Last 4 Encounters:   09/30/24 69   09/25/24 74   09/24/24 73   09/12/24 71      Plan: metoprolol 25 mg bid for VR control   Apixaban 2.5 mg bid for stroke prophylaxis   Cardiology follow as scheduled on 12/15     (I10) Essential hypertension   (N18.32) Stage 3b chronic kidney disease (H)  Comment:   BP Readings from Last 3 Encounters:   09/30/24 124/81   09/25/24 129/84   09/24/24 108/72      Plan: metoprolol 25 mg bid     (I50.33) Acute on chronic heart failure with preserved ejection fraction (H)  (I25.10) Coronary artery disease involving native coronary artery of native heart without angina pectoris  Comment: recent hospitalization requiring IV diuresis    Plan: atorvastatin 20 mg/day and clopidogrel 75 mg/day for secondary prevention  Furosemide 20 mg/day - monitor weights and exam   NTG prn    (M62.830) Back muscle spasm  Comment: she reports this is much improved after Physical Therapist worked with her today   Plan: discharged from the hospital on PRN Flexeril and oxycodone>>> would stop those prior to her going home    Continue acetaminophen 1000 mg bid and PRN    (G47.33) ELIGIO on CPAP  (E66.2) Obesity hypoventilation syndrome (H)  Plan: home settings     (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   She is also on Flonase NS and fexofenadine 180 mg/HS, would change to PRN     (F41.9) Anxiety  Comment: and depression, by history   Plan: remains on PTA duloxetine 60 mg/day and bupropion 100 mg/day     (R53.81) Physical deconditioning  Comment: after acute illnesses   Plan: PHYSICAL THERAPY / OCCUPATIONAL THERAPY for strengthening, balance, gait, ADLs.   Discharge goal is transition to AL given  higher care needs       Halima Mayorga MD

## 2024-10-02 ENCOUNTER — OFFICE VISIT (OUTPATIENT)
Dept: SLEEP MEDICINE | Facility: CLINIC | Age: 89
End: 2024-10-02
Payer: COMMERCIAL

## 2024-10-02 VITALS
HEART RATE: 72 BPM | SYSTOLIC BLOOD PRESSURE: 133 MMHG | DIASTOLIC BLOOD PRESSURE: 68 MMHG | WEIGHT: 227 LBS | OXYGEN SATURATION: 94 % | BODY MASS INDEX: 35.63 KG/M2 | HEIGHT: 67 IN

## 2024-10-02 DIAGNOSIS — G47.33 OSA (OBSTRUCTIVE SLEEP APNEA): Primary | ICD-10-CM

## 2024-10-02 PROCEDURE — 99213 OFFICE O/P EST LOW 20 MIN: CPT | Performed by: INTERNAL MEDICINE

## 2024-10-02 NOTE — PATIENT INSTRUCTIONS
Your Body mass index is 35.55 kg/m .  Weight management is a personal decision.  If you are interested in exploring weight loss strategies, the following discussion covers the approaches that may be successful. Body mass index (BMI) is one way to tell whether you are at a healthy weight, overweight, or obese. It measures your weight in relation to your height.  A BMI of 18.5 to 24.9 is in the healthy range. A person with a BMI of 25 to 29.9 is considered overweight, and someone with a BMI of 30 or greater is considered obese. More than two-thirds of American adults are considered overweight or obese.  Being overweight or obese increases the risk for further weight gain. Excess weight may lead to heart disease and diabetes.  Creating and following plans for healthy eating and physical activity may help you improve your health.  Weight control is part of healthy lifestyle and includes exercise, emotional health, and healthy eating habits. Careful eating habits lifelong are the mainstay of weight control. Though there are significant health benefits from weight loss, long-term weight loss with diet alone may be very difficult to achieve- studies show long-term success with dietary management in less than 10% of people. Attaining a healthy weight may be especially difficult to achieve in those with severe obesity. In some cases, medications, devices and surgical management might be considered.  What can you do?  If you are overweight or obese and are interested in methods for weight loss, you should discuss this with your provider.   Consider reducing daily calorie intake by 500 calories.   Keep a food journal.   Avoiding skipping meals, consider cutting portions instead.    Diet combined with exercise helps maintain muscle while optimizing fat loss. Strength training is particularly important for building and maintaining muscle mass. Exercise helps reduce stress, increase energy, and improves fitness. Increasing  exercise without diet control, however, may not burn enough calories to loose weight.     Start walking three days a week 10-20 minutes at a time  Work towards walking thirty minutes five days a week   Eventually, increase the speed of your walking for 1-2 minutes at time    In addition, we recommend that you review healthy lifestyles and methods for weight loss available through the National Institutes of Health patient information sites:  http://win.niddk.nih.gov/publications/index.htm    And look into health and wellness programs that may be available through your health insurance provider, employer, local community center, or yi club.

## 2024-10-02 NOTE — PROGRESS NOTES
Obstructive Sleep Apnea - PAP Follow-Up Visit:    Chief Complaint   Patient presents with    CPAP Follow Up    Study Results       Moira Andre comes in today for follow-up of their severe sleep apnea, managed with CPAP.     HST on 5/21/2024 showed an apnea-hypopnea index of 73.8/h.AHI was 72.3 per hour supine, N/A per hour prone, N/A per hour on left side, and 82.3 per hour on right side. Baseline oxygen saturation was 90%. Time with saturation less than or equal to 88% was 130.6 minutes. The lowest oxygen saturation was 77%.     She was started on O2 at CPAP therapy for treatment.  Since starting CPAP, patient had a few hospitalizations and extended stay in transitional care.  This affected her ability to adhere to CPAP.    Also, her CPAP mask seems to be defective.     She denies having any specific intolerance to CPAP therapy.  On the few nights that she was able to use treatment, she had stayed on therapy throughout the night.    ResMed     Auto-PAP 5-15 cmH2O download:  14/90 total days of use.  8% days with >4 hours use.  Average use on days used 3 hours 51 minutes per day. Median Leak 9.9 L/min. 95%ile Leak 25.5 L/min. CPAP 95% pressure 9.2 cm. AHI 3.9    EPWORTH SLEEPINESS SCALE         4/29/2024     3:26 PM    [place] Sleepiness Scale ( BONILLA Cheema  [id number]<br>ESS - [place]/English - Final version - 21 Nov 07 - [facility] [place].)   Sitting and reading Slight chance of dozing   Watching TV Slight chance of dozing   Sitting, inactive in a public place (e.g. a theatre or a meeting) Slight chance of dozing   As a passenger in a car for an hour without a break Slight chance of dozing   Lying down to rest in the afternoon when circumstances permit Slight chance of dozing   Sitting and talking to someone Slight chance of dozing   Sitting quietly after a lunch without alcohol Slight chance of dozing   In a car, while stopped for a few minutes in traffic Would never doze   [place] Score (MC) 7   [place] Score  "(Sleep) 7       INSOMNIA SEVERITY INDEX (CHELSEY)          4/29/2024     2:58 PM   Insomnia Severity Index (CHELSEY)   Difficulty falling asleep 2   Difficulty staying asleep 2   Problems waking up too early 2   How SATISFIED/DISSATISFIED are you with your CURRENT sleep pattern? 1   How NOTICEABLE to others do you think your sleep problem is in terms of impairing the quality of your life? 0   How WORRIED/DISTRESSED are you about your current sleep problem? 1   To what extent do you consider your sleep problem to INTERFERE with your daily functioning (e.g. daytime fatigue, mood, ability to function at work/daily chores, concentration, memory, mood, etc.) CURRENTLY? 2   CHELSEY Total Score 10       Guidelines for Scoring/Interpretation:  Total score categories:  0-7 = No clinically significant insomnia   8-14 = Subthreshold insomnia   15-21 = Clinical insomnia (moderate severity)  22-28 = Clinical insomnia (severe)  Used via courtesy of www.SurIDx.va.gov with permission from Jason Locke PhD., [facility]      /68   Pulse 72   Ht 1.702 m (5' 7\")   Wt 103 kg (227 lb)   SpO2 94%   BMI 35.55 kg/m      Impression/Plan:     Severe sleep apnea.     Patient is prescribed auto titrating CPAP therapy for treatment of severe obstructive sleep apnea.  Although she does not have any significant difficulty tolerating CPAP, patient had a few hospitalizations and extended stay in transitional care.  This affected her ability to adhere to CPAP. Also, her CPAP mask seems to be defective.  She was advised to contact [facility] to get appropriate mask replacements and other supplies.  She was advised to regularly adhere to CPAP therapy.    Plan:     Continue auto CPAP therapy with a pressure range of 5 to 15 cm H2O    Moira Andre will follow up in about 9 month(s).     Electronically signed by Dr. Noam Nieto, Diplomate, Sleep Medicine, ABPN       CC:  Ani David,     "

## 2024-10-02 NOTE — NURSING NOTE
"Chief Complaint   Patient presents with    CPAP Follow Up    Study Results       Initial /68   Pulse 72   Ht 1.702 m (5' 7\")   Wt 103 kg (227 lb)   SpO2 94%   BMI 35.55 kg/m   Estimated body mass index is 35.55 kg/m  as calculated from the following:    Height as of this encounter: 1.702 m (5' 7\").    Weight as of this encounter: 103 kg (227 lb).    Medication Reconciliation: complete  Mary Alice Treviño MA   "

## 2024-10-03 NOTE — TELEPHONE ENCOUNTER
NO pictures have been received. Called Bryn Carter. Intitially was transferred to the second floor and the phone rang for over 5 minutes. Called again and was forwarded to the charge phone and spoke with juan Sanders. He is aware that we are watching patient's incision closely as we are concerned about possible infection brewing. She was sent with a written order for weekly photos of her incision to be sent for her MD here to review but none have been received.  He is passing this information on to his nurse manager to have them take a photo and email tomorrow.  I did inform him that I have had a very difficult time getting a hold of anyone to speak with about patient and I am concerned about ongoing communication moving forward. He provided me with the direct line to the 2nd floor nursing desk where patient resides: [phone]. He recommended to call this number and ask to speak with the Second Floor Nurse Manager for any future phone calls.  ADAM BLUE

## 2024-10-04 ENCOUNTER — TELEPHONE (OUTPATIENT)
Dept: CARDIOLOGY | Facility: CLINIC | Age: 89
End: 2024-10-04
Payer: COMMERCIAL

## 2024-10-04 NOTE — TELEPHONE ENCOUNTER
[facility] Call Center    Phone Message    May a detailed message be left on voicemail: yes     Reason for Call: Other: Jeni states that the patient pacemaker incision site some pink and scarring present and would like to get a picture over to the care team . The patient does not use her mychart, so she is looking for an e mail or another way to get this to the care team. Please call her back to discuss.      Action Taken: Other: cardiology    Travel Screening: Not Applicable  Thank you!  [facility]       Date of Service:

## 2024-10-04 NOTE — TELEPHONE ENCOUNTER
Received call from Jeni Gonzales, Intermittent Charge RN at [facility] [place] ([phone]) who requested device nurse email to send over updated photo of patient's incision.  Per Jeni Gonzales, the incision is about 2 inches long and has a scab at the distal point that is about the size of a pencil eraser.    Patient's device was implanted on 7/12/24 by Dr. Baron.  Pt was given Rx for Doxycyline for 2 weeks on 9/4/24.  Pt came in for a courtesy incision check and had pus draining from the sites and was started on Keflex at that time by Dr. Ortega (see pictures in note from 9/9/24).      Photo received as below, incision line is CDI with the single scab at the distal edge of the incision.  Per Jeni Gonzales, there has been no drainage, swelling, or signs of infection.  She stated that the skin below the incision is also CDI without any concerns.      Will route update to Dr. Baron, implanting MD, for review.  Will see if he would like another photo in a week to ensure that the scab has resolved.     MIRZA Naik

## 2024-10-04 NOTE — TELEPHONE ENCOUNTER
Received below message from scheduling regarding patient's PPM incision.  Called [facility] and spoke to Jeni Gonzales intermittent charge RN who will take a photo and send this over.  See Telephone encounter from 08/27/24 for full details as this is a series of photos being tracked of patient's incision.      MIRZA Naik

## 2024-10-07 ENCOUNTER — PATIENT OUTREACH (OUTPATIENT)
Dept: CARE COORDINATION | Facility: CLINIC | Age: 89
End: 2024-10-07
Payer: COMMERCIAL

## 2024-10-07 NOTE — TELEPHONE ENCOUNTER
"Received voicemail message from bright Ngo's nurse at [facility]. She reports incision is \"totally fine\". She reports sending a picture but none received in fax or email.   MIRZA Sow  "

## 2024-10-08 ENCOUNTER — NURSING HOME VISIT (OUTPATIENT)
Dept: GERIATRICS | Facility: CLINIC | Age: 89
End: 2024-10-08
Payer: COMMERCIAL

## 2024-10-08 VITALS
RESPIRATION RATE: 17 BRPM | BODY MASS INDEX: 37.83 KG/M2 | OXYGEN SATURATION: 90 % | DIASTOLIC BLOOD PRESSURE: 93 MMHG | HEIGHT: 67 IN | SYSTOLIC BLOOD PRESSURE: 152 MMHG | HEART RATE: 99 BPM | WEIGHT: 241 LBS | TEMPERATURE: 98.5 F

## 2024-10-08 DIAGNOSIS — F32.1 MODERATE MAJOR DEPRESSION (H): ICD-10-CM

## 2024-10-08 DIAGNOSIS — H93.232 HEARING ABNORMALLY ACUTE, LEFT: Primary | ICD-10-CM

## 2024-10-08 DIAGNOSIS — M25.551 HIP PAIN, RIGHT: ICD-10-CM

## 2024-10-08 PROCEDURE — 99308 SBSQ NF CARE LOW MDM 20: CPT

## 2024-10-08 NOTE — LETTER
10/8/2024      Moira Andre  [address]  [place] [address]        [facility]  Chief Complaint   Patient presents with     [facility] Regulatory     [place] Medical Record Number:  [id number]  Place of Service where encounter took place:  [facility]  () [[id number]]    HPI:    Moira Andre  is 89 year old (9/24/1933), who is being seen today for a federally mandated E/M visit.     By chart review, patient was admitted to [facility] 7/30-8/7/24 for UTI and discharged to previous home. She was rehospitalized 8/2308/28 with generalized weakness and a fall. In ED, labs remarkable for Cr 2.08, WBC 11.0. UA was abnormal. CT of the pelvis and lumbar spine were negative for fracture, high-grade spinal canal foraminal stenosis, noted 9 mm right kidney stone with moderate chronic fat stranding concerning for chronic UTI. CT of the head and cervical spine were negative for acute findings. She received IV ceftiraxone which was discontinued after UC returned without growth 8/25. Ortho was consulted for right hip pain and MRI did not demonstrate an acute fracture, demonstrated advanced degenerative changes with moderate effusion. Hospitalization complicated by encephalopathy attributed to pain medications and oxycodone was changed to tramadol with improvement. Cr improved with IVF. She was recommended TCU at discharge, admitted to current facility for ongoing medical management, rehab, nursing care.     Today's concerns are:  Seen today for follow-up on multiple conditions in LTC. She reports she is still hearing music in her left ear, the three tenors. Most noticeable when it is quiet at night. She wears hearing aides. She is otherwise feeling well, denies CP, SOB, changes in b/b habits, fever/chills.     ALLERGIES:Aspirin, Lidocaine, Lisinopril, Losartan, Metformin, Minocycline, Mounjaro [tirzepatide], Salicylates, Yellow dye, and Yellow dyes (non-tartrazine)  PAST MEDICAL  HISTORY:   Past Medical History:   Diagnosis Date     Aortic valve stenosis     moderate     Arrhythmia     PAT, PVC     Aspirin allergy     Plavix use long term     Asthma      Chronic atrial fibrillation (H) 07/2024    failed meds,  s/p AVN ablation and VVI pacer     CKD (chronic kidney disease) stage 3, GFR 30-59 ml/min (H)     x 2007 atleast     Congestive heart failure, unspecified      Depression      Diabetes mellitus (H) 2010     Diastolic dysfunction, left ventricle 2013    grade 2, nl ef     Falls frequently      HTN (hypertension)      Lactic acidosis 08/2018    due to dehydration and metformin     Migraine headache      Mitral stenosis     moderate MS  mac     Myocardial infarction (H) 9/2007, cath 2013 ml    BMS: stent to OM, diag, nl EF, echo /C angia 2013 , f/u cath no lesion >40%     Nephrolithiasis     right side     OA (osteoarthritis) of knee      Obesity      Rheumatoid arthritis flare (H)     prednisone     Sleep apnea     restarted using cpap 2017     TIA (transient ischaemic attack)      Ventral hernia, unspecified, without mention of obstruction or gangrene      PAST SURGICAL HISTORY:   has a past surgical history that includes Cholecystectomy; Biopsy breast; appendectomy; 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  FAMILY HISTORY: family history includes C.A.D. in her father; Neurologic Disorder in her mother.  SOCIAL HISTORY:  reports that she quit smoking about 51 years ago. Her smoking use included cigarettes. She started smoking about 52 years ago. She has a 0.3 pack-year smoking history. She has never used smokeless tobacco. She reports current alcohol use. She  reports that she does not use drugs.    MEDICATIONS:  MED REC REQUIRED  Post Medication Reconciliation Status: patient was not discharged from an inpatient facility or TCU         Review of your medicines            Accurate as of October 8, 2024 11:59 PM. If you have any questions, ask your nurse or doctor.                CONTINUE these medicines which may have CHANGED, or have new prescriptions. If we are uncertain of the size of tablets/capsules you have at home, strength may be listed as something that might have changed.        Dose / Directions   fluticasone-salmeterol 100-50 MCG/ACT inhaler  Commonly known as: Wixela Inhub  This may have changed:   how much to take  how to take this  when to take this  Used for: Mild intermittent asthma without complication      USE 1 INHALATION BY MOUTH EVERY  12 HOURS  Quantity: 180 each  Refills: 3            CONTINUE these medicines which have NOT CHANGED        Dose / Directions   Accu-Chek Guide test strip  Used for: Type 2 diabetes mellitus with diabetic nephropathy, without long-term current use of insulin (H)  Generic drug: blood glucose      Use to test blood sugar once daily or as directed.  Quantity: 100 strip  Refills: 3     acetaminophen 500 MG tablet  Commonly known as: TYLENOL  Used for: Hip pain, right      Dose: 1,000 mg  Take 2 tablets (1,000 mg) by mouth 3 times daily.  Quantity: 100 tablet  Refills: 0     albuterol 108 (90 Base) MCG/ACT inhaler  Commonly known as: PROAIR HFA/PROVENTIL HFA/VENTOLIN HFA  Used for: SOB (shortness of breath)      Dose: 2 puff  Inhale 2 puffs into the lungs every 6 hours as needed for shortness of breath, wheezing or cough For shortness of breath  Refills: 0     apixaban ANTICOAGULANT 2.5 MG tablet  Commonly known as: ELIQUIS  Indication: Atrial Fibrillation Not Caused By A Heart Valve Problem  Used for: Atrial fibrillation with RVR (H)      Dose: 2.5 mg  Take 1 tablet (2.5 mg) by mouth 2 times daily  Quantity: 180  tablet  Refills: 3     atorvastatin 20 MG tablet  Commonly known as: LIPITOR  Used for: Hyperlipidemia LDL goal <70      Dose: 20 mg  Take 1 tablet (20 mg) by mouth daily for cholesterol  Quantity: 100 tablet  Refills: 3     blood glucose lancing device  Used for: Type 2 diabetes mellitus with diabetic nephropathy, without long-term current use of insulin (H)      Lancing device to be used with lancets.  Quantity: 1 each  Refills: 0     blood glucose monitoring lancets  Used for: Type 2 diabetes mellitus with diabetic nephropathy, without long-term current use of insulin (H)      Use to test blood sugar once daily.  Quantity: 100 each  Refills: 3     buPROPion 100 MG 12 hr tablet  Commonly known as: WELLBUTRIN SR  Used for: Moderate major depression (H)      Dose: 100 mg  Take 1 tablet (100 mg) by mouth daily for mood  Quantity: 90 tablet  Refills: 3     calcium carbonate 500 MG chewable tablet  Commonly known as: TUMS      Dose: 1 chew tab  Take 1 chew tab by mouth 2 times daily as needed for heartburn  Refills: 0     DULoxetine 60 MG capsule  Commonly known as: CYMBALTA  Used for: Moderate major depression (H)      Dose: 60 mg  Take 1 capsule (60 mg) by mouth daily  Quantity: 90 capsule  Refills: 3     fexofenadine 60 MG tablet  Commonly known as: ALLEGRA      Dose: 60 mg  Take 60 mg by mouth daily  Refills: 0     ICY HOT MEDICATED SPRAY EX      Externally apply topically daily as needed (pain, in neck, back, hand)  Refills: 0     melatonin 3 MG tablet  Used for: Insomnia, unspecified type      Dose: 3 mg  Take 1 tablet (3 mg) by mouth at bedtime.  Refills: 0     methocarbamol 750 MG tablet  Commonly known as: ROBAXIN  Used for: Hip pain, right      Dose: 750 mg  Take 1 tablet (750 mg) by mouth 4 times daily as needed for muscle spasms.  Refills: 0     metoprolol tartrate 25 MG tablet  Commonly known as: LOPRESSOR  Used for: Atrial fibrillation with RVR (H)      Dose: 75 mg  TAKE 3 TABLETS BY MOUTH TWICE   DAILY  Quantity: 480 tablet  Refills: 2     miconazole 2 % external cream  Commonly known as: MICATIN      Apply topically 2 times daily as needed for other (rash/irritation)  Refills: 0     nitroGLYcerin 0.4 MG sublingual tablet  Commonly known as: NITROSTAT  Used for: Chest pain, unspecified type      FOR CHEST PAIN PLACE 1 TABLET UNDER THE TONGUE EVERY 5 MINUTES FOR 3 DOSES. IF SYMPTOMS PERSIST 5 MINUTES AFTER 1ST DOSE CALL 911  Quantity: 25 tablet  Refills: 0     pantoprazole 20 MG EC tablet  Commonly known as: PROTONIX  Used for: Gastroesophageal reflux disease without esophagitis      Dose: 20 mg  Take 1 tablet (20 mg) by mouth daily  Quantity: 90 tablet  Refills: 3     senna-docusate 8.6-50 MG tablet  Commonly known as: SENOKOT-S/PERICOLACE  Used for: Hip pain, right      Dose: 1 tablet  Take 1 tablet by mouth 2 times daily. And BID PRN  Refills: 0     torsemide 20 MG tablet  Commonly known as: DEMADEX  Used for: Heart failure with preserved ejection fraction, NYHA class I (H)      Dose: 20 mg  Take 1 tablet (20 mg) by mouth 2 times daily  Quantity: 60 tablet  Refills: 1     traMADol 50 MG tablet  Commonly known as: ULTRAM  Used for: Hip pain, right      Dose: 50 mg  Take 1 tablet (50 mg) by mouth 2 times daily as needed for severe pain.  Quantity: 20 tablet  Refills: 0     triamcinolone 0.1 % external cream  Commonly known as: KENALOG      Apply topically 2 times daily as needed for irritation  Refills: 0     Vitamin D 125 MCG (5000 UT) capsule      Dose: 1 tablet  Take 1 tablet by mouth every evening  Refills: 0               Case Management:  I have reviewed the care plan and MDS and do agree with the plan. Patient's desire to return to the community is present, but is not able due to care needs . Information reviewed:  Medications, vital signs, orders, and nursing notes.    ROS:  4 point ROS including Respiratory, CV, GI and , other than that noted in the HPI,  is negative    Vitals:  BP (!) 152/93    "Pulse 99   Temp 98.5  F (36.9  C)   Resp 17   Ht 1.702 m (5' 7\")   Wt 109.3 kg (241 lb)   SpO2 90%   BMI 37.75 kg/m    Body mass index is 37.75 kg/m .  Exam:  GENERAL APPEARANCE:  Alert, in no distress  RESP:  respiratory effort and palpation of chest normal, no respiratory distress  CV:  regular rate and rhythm, no murmur, rub, or gallop, no edema  ABDOMEN:  normal bowel sounds, soft, nontender, no hepatosplenomegaly or other masses  PSYCH:  affect and mood normal    Lab/Diagnostic data:   Labs done in SNF are in [facility]. Please refer to them using [facility]Care Everywhere. and Recent labs in [facility] reviewed by me today.     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 Tramadol and fexofenadine were discontinued, methocarbamol reduced. Symptoms not especially disturbing to her. She will follow-up with audiology.   Plan: monitor symptoms, follow-up with audiology as directed.    (M25.551) Hip pain, right  Comment: generalized OA, has followed with pain clinic in the past  Plan: continue tylenol 1000 mg TID, methocarbamol PRN, topical bengay, WBAT. 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 No adverse events noted, did previously wonder if contributing to hearing abnormality as above. Now on 40 mg daily.   Plan: continue duloxetine 40 mg daily, monitor mood, symptoms, follow-up with ACP as directed    Electronically signed by:  YOHANNES Charles CNP          Sincerely,        YOHANNES Charles CNP      "

## 2024-10-08 NOTE — PROGRESS NOTES
[facility] GERIATRICS  Chief Complaint   Patient presents with    [facility]     [facility] Medical Record Number:  [id number]  Place of Service where encounter took place:  [facility]  () [[id number]]    HPI:    Moira Andre  is 89 year old (9/24/1933), who is being seen today for a federally mandated E/M visit.     By chart review, patient was admitted to [facility] 7/30-8/7/24 for UTI and discharged to previous home. She was rehospitalized 8/2308/28 with generalized weakness and a fall. In ED, labs remarkable for Cr 2.08, WBC 11.0. UA was abnormal. CT of the pelvis and lumbar spine were negative for fracture, high-grade spinal canal foraminal stenosis, noted 9 mm right kidney stone with moderate chronic fat stranding concerning for chronic UTI. CT of the head and cervical spine were negative for acute findings. She received IV ceftiraxone which was discontinued after UC returned without growth 8/25. Ortho was consulted for right hip pain and MRI did not demonstrate an acute fracture, demonstrated advanced degenerative changes with moderate effusion. Hospitalization complicated by encephalopathy attributed to pain medications and oxycodone was changed to tramadol with improvement. Cr improved with IVF. She was recommended TCU at discharge, admitted to current facility for ongoing medical management, rehab, nursing care.     Today's concerns are:  Seen today for follow-up on multiple conditions in LTC. She reports she is still hearing music in her left ear, the three tenors. Most noticeable when it is quiet at night. She wears hearing aides. She is otherwise feeling well, denies CP, SOB, changes in b/b habits, fever/chills.     ALLERGIES:Aspirin, Lidocaine, Lisinopril, Losartan, Metformin, Minocycline, Mounjaro [tirzepatide], Salicylates, Yellow dye, and Yellow dyes (non-tartrazine)  PAST MEDICAL HISTORY:   Past Medical History:   Diagnosis Date    Aortic valve stenosis     moderate     Arrhythmia     PAT, PVC    Aspirin allergy     Plavix use long term    Asthma     Chronic atrial fibrillation (H) 07/2024    failed meds,  s/p AVN ablation and VVI pacer    CKD (chronic kidney disease) stage 3, GFR 30-59 ml/min (H)     x 2007 atleast    Congestive heart failure, unspecified     Depression     Diabetes mellitus (H) 2010    Diastolic dysfunction, left ventricle 2013    grade 2, nl ef    Falls frequently     HTN (hypertension)     Lactic acidosis 08/2018    due to dehydration and metformin    Migraine headache     Mitral stenosis     moderate MS  mac    Myocardial infarction (H) 9/2007, cath 2013 ml    BMS: stent to OM, diag, nl EF, echo /C angia 2013 , f/u cath no lesion >40%    Nephrolithiasis     right side    OA (osteoarthritis) of knee     Obesity     Rheumatoid arthritis flare (H)     prednisone    Sleep apnea     restarted using cpap 2017    TIA (transient ischaemic attack)     Ventral hernia, unspecified, without mention of obstruction or gangrene      PAST SURGICAL HISTORY:   has a past surgical history that includes Cholecystectomy; Biopsy breast; appendectomy; 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  FAMILY HISTORY: family history includes C.A.D. in her father; Neurologic Disorder in her mother.  SOCIAL HISTORY:  reports that she quit smoking about 51 years ago. Her smoking use included cigarettes. She started smoking about 52 years ago. She has a 0.3 pack-year smoking history. She has never used smokeless tobacco. She reports current alcohol use. She reports that she does not use drugs.    MEDICATIONS:  MED REC REQUIRED  Post Medication Reconciliation Status: patient  was not discharged from an inpatient facility or TCU         Review of your medicines            Accurate as of October 8, 2024 11:59 PM. If you have any questions, ask your nurse or doctor.                CONTINUE these medicines which may have CHANGED, or have new prescriptions. If we are uncertain of the size of tablets/capsules you have at home, strength may be listed as something that might have changed.        Dose / Directions   fluticasone-salmeterol 100-50 MCG/ACT inhaler  Commonly known as: Wixela Inhub  This may have changed:   how much to take  how to take this  when to take this  Used for: Mild intermittent asthma without complication      USE 1 INHALATION BY MOUTH EVERY  12 HOURS  Quantity: 180 each  Refills: 3            CONTINUE these medicines which have NOT CHANGED        Dose / Directions   Accu-Chek Guide test strip  Used for: Type 2 diabetes mellitus with diabetic nephropathy, without long-term current use of insulin (H)  Generic drug: blood glucose      Use to test blood sugar once daily or as directed.  Quantity: 100 strip  Refills: 3     acetaminophen 500 MG tablet  Commonly known as: TYLENOL  Used for: Hip pain, right      Dose: 1,000 mg  Take 2 tablets (1,000 mg) by mouth 3 times daily.  Quantity: 100 tablet  Refills: 0     albuterol 108 (90 Base) MCG/ACT inhaler  Commonly known as: PROAIR HFA/PROVENTIL HFA/VENTOLIN HFA  Used for: SOB (shortness of breath)      Dose: 2 puff  Inhale 2 puffs into the lungs every 6 hours as needed for shortness of breath, wheezing or cough For shortness of breath  Refills: 0     apixaban ANTICOAGULANT 2.5 MG tablet  Commonly known as: ELIQUIS  Indication: Atrial Fibrillation Not Caused By A Heart Valve Problem  Used for: Atrial fibrillation with RVR (H)      Dose: 2.5 mg  Take 1 tablet (2.5 mg) by mouth 2 times daily  Quantity: 180 tablet  Refills: 3     atorvastatin 20 MG tablet  Commonly known as: LIPITOR  Used for: Hyperlipidemia LDL goal <70      Dose: 20  mg  Take 1 tablet (20 mg) by mouth daily for cholesterol  Quantity: 100 tablet  Refills: 3     blood glucose lancing device  Used for: Type 2 diabetes mellitus with diabetic nephropathy, without long-term current use of insulin (H)      Lancing device to be used with lancets.  Quantity: 1 each  Refills: 0     blood glucose monitoring lancets  Used for: Type 2 diabetes mellitus with diabetic nephropathy, without long-term current use of insulin (H)      Use to test blood sugar once daily.  Quantity: 100 each  Refills: 3     buPROPion 100 MG 12 hr tablet  Commonly known as: WELLBUTRIN SR  Used for: Moderate major depression (H)      Dose: 100 mg  Take 1 tablet (100 mg) by mouth daily for mood  Quantity: 90 tablet  Refills: 3     calcium carbonate 500 MG chewable tablet  Commonly known as: TUMS      Dose: 1 chew tab  Take 1 chew tab by mouth 2 times daily as needed for heartburn  Refills: 0     DULoxetine 60 MG capsule  Commonly known as: CYMBALTA  Used for: Moderate major depression (H)      Dose: 60 mg  Take 1 capsule (60 mg) by mouth daily  Quantity: 90 capsule  Refills: 3     fexofenadine 60 MG tablet  Commonly known as: ALLEGRA      Dose: 60 mg  Take 60 mg by mouth daily  Refills: 0     ICY HOT MEDICATED SPRAY EX      Externally apply topically daily as needed (pain, in neck, back, hand)  Refills: 0     melatonin 3 MG tablet  Used for: Insomnia, unspecified type      Dose: 3 mg  Take 1 tablet (3 mg) by mouth at bedtime.  Refills: 0     methocarbamol 750 MG tablet  Commonly known as: ROBAXIN  Used for: Hip pain, right      Dose: 750 mg  Take 1 tablet (750 mg) by mouth 4 times daily as needed for muscle spasms.  Refills: 0     metoprolol tartrate 25 MG tablet  Commonly known as: LOPRESSOR  Used for: Atrial fibrillation with RVR (H)      Dose: 75 mg  TAKE 3 TABLETS BY MOUTH TWICE  DAILY  Quantity: 480 tablet  Refills: 2     miconazole 2 % external cream  Commonly known as: MICATIN      Apply topically 2 times daily  "as needed for other (rash/irritation)  Refills: 0     nitroGLYcerin 0.4 MG sublingual tablet  Commonly known as: NITROSTAT  Used for: Chest pain, unspecified type      FOR CHEST PAIN PLACE 1 TABLET UNDER THE TONGUE EVERY 5 MINUTES FOR 3 DOSES. IF SYMPTOMS PERSIST 5 MINUTES AFTER 1ST DOSE CALL 911  Quantity: 25 tablet  Refills: 0     pantoprazole 20 MG EC tablet  Commonly known as: PROTONIX  Used for: Gastroesophageal reflux disease without esophagitis      Dose: 20 mg  Take 1 tablet (20 mg) by mouth daily  Quantity: 90 tablet  Refills: 3     senna-docusate 8.6-50 MG tablet  Commonly known as: SENOKOT-S/PERICOLACE  Used for: Hip pain, right      Dose: 1 tablet  Take 1 tablet by mouth 2 times daily. And BID PRN  Refills: 0     torsemide 20 MG tablet  Commonly known as: DEMADEX  Used for: Heart failure with preserved ejection fraction, NYHA class I (H)      Dose: 20 mg  Take 1 tablet (20 mg) by mouth 2 times daily  Quantity: 60 tablet  Refills: 1     traMADol 50 MG tablet  Commonly known as: ULTRAM  Used for: Hip pain, right      Dose: 50 mg  Take 1 tablet (50 mg) by mouth 2 times daily as needed for severe pain.  Quantity: 20 tablet  Refills: 0     triamcinolone 0.1 % external cream  Commonly known as: KENALOG      Apply topically 2 times daily as needed for irritation  Refills: 0     Vitamin D 125 MCG (5000 UT) capsule      Dose: 1 tablet  Take 1 tablet by mouth every evening  Refills: 0               Case Management:  I have reviewed the care plan and MDS and do agree with the plan. Patient's desire to return to the community is present, but is not able due to care needs . Information reviewed:  Medications, vital signs, orders, and nursing notes.    ROS:  4 point ROS including Respiratory, CV, GI and , other than that noted in the HPI,  is negative    Vitals:  BP (!) 152/93   Pulse 99   Temp 98.5  F (36.9  C)   Resp 17   Ht 1.702 m (5' 7\")   Wt 109.3 kg (241 lb)   SpO2 90%   BMI 37.75 kg/m    Body mass " index is 37.75 kg/m .  Exam:  GENERAL APPEARANCE:  Alert, in no distress  RESP:  respiratory effort and palpation of chest normal, no respiratory distress  CV:  regular rate and rhythm, no murmur, rub, or gallop, no edema  ABDOMEN:  normal bowel sounds, soft, nontender, no hepatosplenomegaly or other masses  PSYCH:  affect and mood normal    Lab/Diagnostic data:   Labs done in [facility] are in [facility]. Please refer to them using gate5/Care Everywhere. and Recent labs in [facility] reviewed by me today.     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 Tramadol and fexofenadine were discontinued, methocarbamol reduced. Symptoms not especially disturbing to her. She will follow-up with audiology.   Plan: monitor symptoms, follow-up with audiology as directed.    (M25.551) Hip pain, right  Comment: generalized OA, has followed with pain clinic in the past  Plan: continue tylenol 1000 mg TID, methocarbamol PRN, topical bengay, WBAT. 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 No adverse events noted, did previously wonder if contributing to hearing abnormality as above. Now on 40 mg daily.   Plan: continue duloxetine 40 mg daily, monitor mood, symptoms, follow-up with ACP as directed    Electronically signed by:  YOHANNES Charles CNP

## 2024-10-09 NOTE — TELEPHONE ENCOUNTER
Return call from bright Muse's nurse at [facility]. Requested updated picture be sent this Friday, 10/11/24. Device email and fax number given. MIRZA Sow

## 2024-10-09 NOTE — TELEPHONE ENCOUNTER
Davion Baron MD  You2 days ago     Another photo next week     Received the above response from Dr. Baron. Called [facility] at [phone].  The manager and nurse are currently unavailable and they will call us back.  Will notify them that an updated photo of the patient's incision will need to be sent into the device nurse email on Friday 10/11/24 for review.  Device clinic phone number provided.  Message routed to the device pool to watch for photo on Friday.     MIRZA Naik

## 2024-10-11 NOTE — TELEPHONE ENCOUNTER
Called and spoke with [facility] who stated there is not currently a Clinical Manager in the office, but they may come in later.  The iPad to take photos is in their office.  Requested they send an updated photo of the incision and surrounding tissue to the device nurse email ([email or web address]).  She will see if there is someone available that can help with this.     MIRZA Naik       [id number] Addendum on 10/11/24:  No photo has been received.  Attempted to call [facility] again and no answer was received.  Will attempt again on Monday.  MIRZA Naik

## 2024-10-14 ENCOUNTER — NURSING HOME VISIT (OUTPATIENT)
Dept: GERIATRICS | Facility: CLINIC | Age: 89
End: 2024-10-14
Payer: COMMERCIAL

## 2024-10-14 VITALS — BODY MASS INDEX: 37.83 KG/M2 | WEIGHT: 241 LBS | HEIGHT: 67 IN

## 2024-10-14 DIAGNOSIS — L03.032 CELLULITIS OF SECOND TOE OF LEFT FOOT: Primary | ICD-10-CM

## 2024-10-14 DIAGNOSIS — L03.032 CELLULITIS OF THIRD TOE OF LEFT FOOT: ICD-10-CM

## 2024-10-14 PROCEDURE — 99309 SBSQ NF CARE MODERATE MDM 30: CPT

## 2024-10-14 NOTE — PROGRESS NOTES
"[facility] GERIATRICS    Chief Complaint   Patient presents with    RECHECK     HPI:  Moira Andre is a 89 year old  (9/24/1933), who is being seen today for an episodic care visit at: [facility]  () [[id number]]. Today's concern is: Seen today at nursing request for complaint of skin concern. By nursing report, noted redness and edema on the left foot this morning. Seen today up on the unit in LTC. She kicks her shoe off and states \"its no big deal.\" Endorses pain and swelling. She has a bandage on one toe. She denies NVD, fever/chills.     Allergies, and PMH/PSH reviewed in EPIC today.  REVIEW OF SYSTEMS:  Limited secondary to cognitive impairment but today pt reports the above and 4 point ROS including Respiratory, CV, GI and , other than that noted in the HPI,  is negative    Objective:   Ht 1.702 m (5' 7\")   Wt 109.3 kg (241 lb)   BMI 37.75 kg/m    GENERAL APPEARANCE:  Alert, in no distress  RESP:  respiratory effort and palpation of chest normal, lungs clear to auscultation , no respiratory distress  CV:  regular rate and rhythm, no murmur, rub, or gallop  ABDOMEN:  normal bowel sounds, soft, nontender, no hepatosplenomegaly or other masses  M/S:   Gait and station abnormal transfers with assist, wheelchair for mobility  SKIN:  left foot second toe bandaged, third toe ulceration, moderate edema with erythema extending onto the dorsal foot, warm to touch, scant serous drainge  NEURO:   matthews freely  PSYCH:  affect and mood normal    Labs done in SNF are in [facility]. Please refer to them using EPIC/Care Everywhere. and Recent labs in [facility] reviewed by me today.     Assessment/Plan:  (L03.032) Cellulitis of second toe of left foot  (primary encounter diagnosis)  (L03.032) Cellulitis of third toe of left foot  Comment: acute, with erythema, edema, tenderness. Ulceration to third toe likely portal of entry. She is not toxic appearing. Will treat with keflex renally dosed for Estimated " Creatinine Clearance: 22.1 mL/min (A) (based on SCr of 2.11 mg/dL (H)). Note allergy to yellow dye, she has tolerated keflex previously  Plan: keflex 500 mg Q12H x 5 days, continue wound care per nursing, monitor symptoms    MED REC REQUIRED  Post Medication Reconciliation Status: patient was not discharged from an inpatient facility or TCU      Orders:  Keflex 500 mg BID x 5 days    Electronically signed by: YOHANNES Charles CNP

## 2024-10-14 NOTE — LETTER
" 10/14/2024      Moira Andre  [address]  [place] [id number]        [facility] GERIATRICS    Chief Complaint   Patient presents with     RECHECK     HPI:  Moira Andre is a 89 year old  (9/24/1933), who is being seen today for an episodic care visit at: [facility]  () [[id number]]. Today's concern is: Seen today at nursing request for complaint of skin concern. By nursing report, noted redness and edema on the left foot this morning. Seen today up on the unit in LTC. She kicks her shoe off and states \"its no big deal.\" Endorses pain and swelling. She has a bandage on one toe. She denies NVD, fever/chills.     Allergies, and PMH/PSH reviewed in EPIC today.  REVIEW OF SYSTEMS:  Limited secondary to cognitive impairment but today pt reports the above and 4 point ROS including Respiratory, CV, GI and , other than that noted in the HPI,  is negative    Objective:   Ht 1.702 m (5' 7\")   Wt 109.3 kg (241 lb)   BMI 37.75 kg/m    GENERAL APPEARANCE:  Alert, in no distress  RESP:  respiratory effort and palpation of chest normal, lungs clear to auscultation , no respiratory distress  CV:  regular rate and rhythm, no murmur, rub, or gallop  ABDOMEN:  normal bowel sounds, soft, nontender, no hepatosplenomegaly or other masses  M/S:   Gait and station abnormal transfers with assist, wheelchair for mobility  SKIN:  left foot second toe bandaged, third toe ulceration, moderate edema with erythema extending onto the dorsal foot, warm to touch, scant serous drainge  NEURO:   matthews freely  PSYCH:  affect and mood normal    Labs done in SNF are in [facility]. Please refer to them using EPIC/Care Everywhere. and Recent labs in [facility] reviewed by me today.     Assessment/Plan:  (L03.032) Cellulitis of second toe of left foot  (primary encounter diagnosis)  (L03.032) Cellulitis of third toe of left foot  Comment: acute, with erythema, edema, tenderness. Ulceration to third toe likely portal " of entry. She is not toxic appearing. Will treat with keflex renally dosed for Estimated Creatinine Clearance: 22.1 mL/min (A) (based on SCr of 2.11 mg/dL (H)). Note allergy to yellow dye, she has tolerated keflex previously  Plan: keflex 500 mg Q12H x 5 days, continue wound care per nursing, monitor symptoms    MED REC REQUIRED  Post Medication Reconciliation Status: patient was not discharged from an inpatient facility or TCU      Orders:  Keflex 500 mg BID x 5 days    Electronically signed by: YOHANNES Charles CNP         Sincerely,        YOHANNES Charles CNP

## 2024-10-17 ENCOUNTER — PATIENT OUTREACH (OUTPATIENT)
Dept: CARE COORDINATION | Facility: CLINIC | Age: 89
End: 2024-10-17
Payer: COMMERCIAL

## 2024-10-25 ENCOUNTER — TELEPHONE (OUTPATIENT)
Dept: GERIATRICS | Facility: CLINIC | Age: 89
End: 2024-10-25
Payer: COMMERCIAL

## 2024-10-25 DIAGNOSIS — I10 ESSENTIAL HYPERTENSION: Primary | ICD-10-CM

## 2024-10-25 RX ORDER — AMLODIPINE BESYLATE 2.5 MG/1
2.5 TABLET ORAL DAILY
Qty: 90 TABLET | Refills: 0 | Status: SHIPPED | OUTPATIENT
Start: 2024-10-25 | End: 2024-11-07

## 2024-10-25 NOTE — CONFIDENTIAL NOTE
[facility] Geriatrics Telephone Encounter    HPI: 89 yo female with PMH HTN, afib, CKD    Reason for Call: Nursing reporting SBP >140 for the past 10 or so days. No reported symptoms    Onset: gradual       A/P: chronic hypertension. BB was decreased by cardiology in September    Imaging Ordered: No    Labs Ordered: no.    Medication Ordered:  Yes     Sent to ED:  No    Orders  Moira Andre  9/24/1933     Amlodipine 2.5 mg daily dx: HTN      YOHANNES Charles CNP on 10/25/2024 at 3:55 PM

## 2024-10-28 ENCOUNTER — PATIENT OUTREACH (OUTPATIENT)
Dept: CARE COORDINATION | Facility: CLINIC | Age: 89
End: 2024-10-28
Payer: COMMERCIAL

## 2024-10-28 NOTE — PROGRESS NOTES
Clinic Care Coordination Contact    The Community Health Worker planned to call for a Care Coordination outreach to follow up on goals and action steps.     Did Not Contact Patient.    Per Chart Review, patient is admitted in LTC at [facility].    BEATRIZ Velazquez  Clinic Care Coordination  [facility]: [facility], [place], [place], and [facility]  Phone: [phone]

## 2024-10-31 ENCOUNTER — PATIENT OUTREACH (OUTPATIENT)
Dept: CARE COORDINATION | Facility: CLINIC | Age: 89
End: 2024-10-31
Payer: COMMERCIAL

## 2024-11-04 ENCOUNTER — PATIENT OUTREACH (OUTPATIENT)
Dept: CARE COORDINATION | Facility: CLINIC | Age: 89
End: 2024-11-04
Payer: COMMERCIAL

## 2024-11-04 ASSESSMENT — PATIENT HEALTH QUESTIONNAIRE - PHQ9: SUM OF ALL RESPONSES TO PHQ QUESTIONS 1-9: 10

## 2024-11-04 NOTE — PROGRESS NOTES
Clinic Care Coordination Contact  Care Coordination Clinician Chart Review    Situation: Patient chart reviewed by care coordinator.    Background: Clinic Care Coordination Referral received from inpatient care team for transition handoff communication following hospital admission.   Pt was enrolled in [facility] as well.      Assessment: Upon chart review, patient is not a candidate for Primary Care Clinic Care Coordination enrollment due to reason stated below:  Patient transitioned to Long Term Care.    Plan/Recommendations: Clinic Care Coordination Referral/order cancelled. RN/SW CC will perform no further monitoring/outreaches at this time and will remain available as needed. If new needs arise, a new Care Coordination Referral may be placed.    Maria L Mckeon,  [facility]  Clinic Care Coordinator  [facility]  [phone]  [email or web address]

## 2024-11-04 NOTE — Clinical Note
It appears that she is in LTC or AL, and has switched PCP to the facility PCP.  So I am closing her.

## 2024-11-07 ENCOUNTER — NURSING HOME VISIT (OUTPATIENT)
Dept: GERIATRICS | Facility: CLINIC | Age: 89
End: 2024-11-07
Payer: COMMERCIAL

## 2024-11-07 ENCOUNTER — LAB REQUISITION (OUTPATIENT)
Dept: LAB | Facility: CLINIC | Age: 89
End: 2024-11-07
Payer: COMMERCIAL

## 2024-11-07 VITALS
HEIGHT: 67 IN | OXYGEN SATURATION: 94 % | WEIGHT: 239 LBS | BODY MASS INDEX: 37.51 KG/M2 | TEMPERATURE: 97.8 F | HEART RATE: 71 BPM | RESPIRATION RATE: 18 BRPM | DIASTOLIC BLOOD PRESSURE: 76 MMHG | SYSTOLIC BLOOD PRESSURE: 134 MMHG

## 2024-11-07 DIAGNOSIS — I10 ESSENTIAL HYPERTENSION: ICD-10-CM

## 2024-11-07 DIAGNOSIS — N18.4 CKD (CHRONIC KIDNEY DISEASE) STAGE 4, GFR 15-29 ML/MIN (H): ICD-10-CM

## 2024-11-07 DIAGNOSIS — R60.9 EDEMA, UNSPECIFIED: ICD-10-CM

## 2024-11-07 DIAGNOSIS — I50.9 ACUTE ON CHRONIC CONGESTIVE HEART FAILURE, UNSPECIFIED HEART FAILURE TYPE (H): Primary | ICD-10-CM

## 2024-11-07 PROCEDURE — 99309 SBSQ NF CARE MODERATE MDM 30: CPT

## 2024-11-07 NOTE — PROGRESS NOTES
"[facility] GERIATRICS    Chief Complaint   Patient presents with    Nursing Home Acute     HPI:  Moira Andre is a 89 year old  (9/24/1933), who is being seen today for an episodic care visit at: [facility]  () [[id number]].     Today's concern is: Seen today for follow-up at nursing request for report of weeping extremities. Seen today in her room in LTC up to wheelchair. She reports her left leg is weeping. She went to breakfast and now her pants and shoes are saturated. She has a small open area where fluid is weeping from. She has edema in both legs, typically can get some in the RLE but now present in both legs. She denies orthopnea or HENDERSON. She sleeps with several pillows and HOB elevated normally. She was told to elevate her legs last night and planning to get in bed for the same this morning.  She is denying CP, changes in b/b habits, fever/chills.     Patient was notably started on amlodipine 10/26 for elevated SBP readings above goal.     Allergies, and PMH/PSH reviewed in EPIC today.  REVIEW OF SYSTEMS:  Limited secondary to cognitive impairment but today pt reports the above and 4 point ROS including Respiratory, CV, GI and , other than that noted in the HPI,  is negative    Objective:   /76   Pulse 71   Temp 97.8  F (36.6  C)   Resp 18   Ht 1.702 m (5' 7\")   Wt 108.4 kg (239 lb)   SpO2 94%   BMI 37.43 kg/m    GENERAL APPEARANCE:  Alert, in no distress  RESP:  respiratory effort and palpation of chest normal, lungs clear to auscultation , no respiratory distress  CV:  regular rate and rhythm, no murmur, rub, or gallop, peripheral edema 2+ in BLE  ABDOMEN:  normal bowel sounds, soft, nontender, no hepatosplenomegaly or other masses  M/S:   Gait and station abnormal transfers with assist, wheelchair for mobility  SKIN:  LLE superficial abrasion to the shin weeping serous fluid  NEURO:   matthews freely  PSYCH:  memory impaired , affect and mood normal    Labs done in SNF are " in [facility]. Please refer to them using [employer]/Care Everywhere. and Recent labs in EPIC reviewed by me today.     Assessment/Plan:  (I50.9) Acute on chronic congestive heart failure, unspecified heart failure type (H)  (primary encounter diagnosis)  (N18.4) CKD (chronic kidney disease) stage 4, GFR 15-29 ml/min (H)  (I10) Essential hypertension  Comment: Increased BLE edema concerning for CHF exacerbation. Recent addition of amlodipine may be contributing, may not need another antihypertensive agent if fluid status is improved. On review of daily weights these are incredibly inconsistent, varying up to 20 or 40 lbs per day. Weight today is 239 which is stable from admission weight of 240. Will increase torsemide x 2 days and try to trend weights, repeat BMP to check electrolytes now and early next week to monitor kidney function.  Plan: Discontinue amlodipine. Start compression with tubi  on AM/off PM. Increase torsemide to 40 mg QAM and 20 mg at 2 PM x 2 days, then resume 20 mg BID. Continue to monitor weights, exam. Encourage elevation. BMP 11/8 and 11/11.       MED REC REQUIRED  Post Medication Reconciliation Status: patient was not discharged from an inpatient facility or TCU    Electronically signed by: YOHANNES Charles CNP

## 2024-11-07 NOTE — LETTER
" 11/7/2024      Moira Andre  [address]  [facility] [address]        [facility] GERIATRICS    Chief Complaint   Patient presents with     Nursing Home Acute     HPI:  Moira Andre is a 89 year old  (9/24/1933), who is being seen today for an episodic care visit at: [facility]  () [[id number]].     Today's concern is: Seen today for follow-up at nursing request for report of weeping extremities. Seen today in her room in LTC up to wheelchair. She reports her left leg is weeping. She went to breakfast and now her pants and shoes are saturated. She has a small open area where fluid is weeping from. She has edema in both legs, typically can get some in the RLE but now present in both legs. She denies orthopnea or HENDERSON. She sleeps with several pillows and HOB elevated normally. She was told to elevate her legs last night and planning to get in bed for the same this morning.  She is denying CP, changes in b/b habits, fever/chills.     Patient was notably started on amlodipine 10/26 for elevated SBP readings above goal.     Allergies, and PMH/PSH reviewed in EPIC today.  REVIEW OF SYSTEMS:  Limited secondary to cognitive impairment but today pt reports the above and 4 point ROS including Respiratory, CV, GI and , other than that noted in the HPI,  is negative    Objective:   /76   Pulse 71   Temp 97.8  F (36.6  C)   Resp 18   Ht 1.702 m (5' 7\")   Wt 108.4 kg (239 lb)   SpO2 94%   BMI 37.43 kg/m    GENERAL APPEARANCE:  Alert, in no distress  RESP:  respiratory effort and palpation of chest normal, lungs clear to auscultation , no respiratory distress  CV:  regular rate and rhythm, no murmur, rub, or gallop, peripheral edema 2+ in BLE  ABDOMEN:  normal bowel sounds, soft, nontender, no hepatosplenomegaly or other masses  M/S:   Gait and station abnormal transfers with assist, wheelchair for mobility  SKIN:  LLE superficial abrasion to the shin weeping serous " fluid  NEURO:   matthews freely  PSYCH:  memory impaired , affect and mood normal    Labs done in SNF are in [place] EPIC. Please refer to them using [place]/Care Everywhere. and Recent labs in EPIC reviewed by me today.     Assessment/Plan:  (I50.9) Acute on chronic congestive heart failure, unspecified heart failure type (H)  (primary encounter diagnosis)  (N18.4) CKD (chronic kidney disease) stage 4, GFR 15-29 ml/min (H)  (I10) Essential hypertension  Comment: Increased BLE edema concerning for CHF exacerbation. Recent addition of amlodipine may be contributing, may not need another antihypertensive agent if fluid status is improved. On review of daily weights these are incredibly inconsistent, varying up to 20 or 40 lbs per day. Weight today is 239 which is stable from admission weight of 240. Will increase torsemide x 2 days and try to trend weights, repeat BMP to check electrolytes now and early next week to monitor kidney function.  Plan: Discontinue amlodipine. Start compression with tubi  on AM/off PM. Increase torsemide to 40 mg QAM and 20 mg at 2 PM x 2 days, then resume 20 mg BID. Continue to monitor weights, exam. Encourage elevation. BMP 11/8 and 11/11.       MED REC REQUIRED  Post Medication Reconciliation Status: patient was not discharged from an inpatient facility or TCU    Electronically signed by: YOHANNES Charles CNP         Sincerely,        YOHANNES Charles CNP

## 2024-11-08 LAB
ANION GAP SERPL CALCULATED.3IONS-SCNC: 17 MMOL/L (ref 7–15)
BUN SERPL-MCNC: 25.7 MG/DL (ref 8–23)
CALCIUM SERPL-MCNC: 9.3 MG/DL (ref 8.8–10.4)
CHLORIDE SERPL-SCNC: 101 MMOL/L (ref 98–107)
CREAT SERPL-MCNC: 1.97 MG/DL (ref 0.51–0.95)
EGFRCR SERPLBLD CKD-EPI 2021: 23 ML/MIN/1.73M2
GLUCOSE SERPL-MCNC: 154 MG/DL (ref 70–99)
HCO3 SERPL-SCNC: 22 MMOL/L (ref 22–29)
POTASSIUM SERPL-SCNC: 4.1 MMOL/L (ref 3.4–5.3)
SODIUM SERPL-SCNC: 140 MMOL/L (ref 135–145)

## 2024-11-08 PROCEDURE — 36415 COLL VENOUS BLD VENIPUNCTURE: CPT | Mod: ORL

## 2024-11-08 PROCEDURE — P9604 ONE-WAY ALLOW PRORATED TRIP: HCPCS | Mod: ORL

## 2024-11-08 PROCEDURE — 80048 BASIC METABOLIC PNL TOTAL CA: CPT | Mod: ORL

## 2024-11-10 ENCOUNTER — LAB REQUISITION (OUTPATIENT)
Dept: LAB | Facility: CLINIC | Age: 89
End: 2024-11-10
Payer: COMMERCIAL

## 2024-11-10 DIAGNOSIS — R60.9 EDEMA, UNSPECIFIED: ICD-10-CM

## 2024-11-11 LAB
ANION GAP SERPL CALCULATED.3IONS-SCNC: 16 MMOL/L (ref 7–15)
BUN SERPL-MCNC: 30.4 MG/DL (ref 8–23)
CALCIUM SERPL-MCNC: 8.8 MG/DL (ref 8.8–10.4)
CHLORIDE SERPL-SCNC: 102 MMOL/L (ref 98–107)
CREAT SERPL-MCNC: 1.93 MG/DL (ref 0.51–0.95)
EGFRCR SERPLBLD CKD-EPI 2021: 24 ML/MIN/1.73M2
GLUCOSE SERPL-MCNC: 167 MG/DL (ref 70–99)
HCO3 SERPL-SCNC: 21 MMOL/L (ref 22–29)
POTASSIUM SERPL-SCNC: 4.1 MMOL/L (ref 3.4–5.3)
SODIUM SERPL-SCNC: 139 MMOL/L (ref 135–145)

## 2024-11-11 PROCEDURE — P9603 ONE-WAY ALLOW PRORATED MILES: HCPCS | Mod: ORL

## 2024-11-11 PROCEDURE — 80048 BASIC METABOLIC PNL TOTAL CA: CPT | Mod: ORL

## 2024-11-11 PROCEDURE — 36415 COLL VENOUS BLD VENIPUNCTURE: CPT | Mod: ORL

## 2024-11-19 ENCOUNTER — NURSING HOME VISIT (OUTPATIENT)
Dept: GERIATRICS | Facility: CLINIC | Age: 89
End: 2024-11-19
Payer: COMMERCIAL

## 2024-11-19 VITALS
RESPIRATION RATE: 18 BRPM | TEMPERATURE: 97.9 F | BODY MASS INDEX: 37.83 KG/M2 | WEIGHT: 241 LBS | SYSTOLIC BLOOD PRESSURE: 127 MMHG | HEIGHT: 67 IN | OXYGEN SATURATION: 93 % | HEART RATE: 75 BPM | DIASTOLIC BLOOD PRESSURE: 71 MMHG

## 2024-11-19 DIAGNOSIS — F32.1 MODERATE MAJOR DEPRESSION (H): Primary | ICD-10-CM

## 2024-11-19 DIAGNOSIS — H91.92 CHANGE IN HEARING OF LEFT EAR: ICD-10-CM

## 2024-11-19 DIAGNOSIS — F43.23 ADJUSTMENT DISORDER WITH MIXED ANXIETY AND DEPRESSED MOOD: ICD-10-CM

## 2024-11-19 PROCEDURE — 99309 SBSQ NF CARE MODERATE MDM 30: CPT

## 2024-11-19 NOTE — LETTER
" 11/19/2024      Moira Andre  [address]  [facility] [address]        [facility] GERIATRICS    Chief Complaint   Patient presents with     Nursing Home Acute     HPI:  Moira Andre is a 89 year old  (9/24/1933), who is being seen today for an episodic care visit at: [facility]  () [[id number]]. Today's concern is: Seen today up on the unit in LTC, tearful coming off of the elevator. Assisted patient to a private setting where she reports \"I don't know what's wrong, I can't stop crying\" Patient explains she recently moved rooms after a roommate conflict. She is very thankful to have a private room but surprised to find herself still feeling unhappy. She is meeting with the  next week to discuss discharge planning and is hopeful about that. She is complimentary of nursing staff and activities available on site but feels notes she has always been independent and is struggling with uncertainty moving forward. She is \"overwhelmed\" at that thought of medication changes. She is still hearing music in quiet settings. She is denying CP, SOB, changes in b/b habits, SI/HI, dizziness or light headedness.     Allergies, and PMH/PSH reviewed in [employer] today.  REVIEW OF SYSTEMS:  Limited secondary to cognitive impairment but today pt reports the above and 4 point ROS including Respiratory, CV, GI and , other than that noted in the HPI,  is negative    Objective:   /71   Pulse 75   Temp 97.9  F (36.6  C)   Resp 18   Ht 1.702 m (5' 7\")   Wt 109.3 kg (241 lb)   SpO2 93%   BMI 37.75 kg/m    GENERAL APPEARANCE:  Alert  RESP:  respiratory effort and palpation of chest normal, no respiratory distress  CV:  regular rate and rhythm, no murmur, rub, or gallop, peripheral edema 1+ in both feet  M/S:   Gait and station abnormal transfers with assist  SKIN:  Inspection of skin and subcutaneous tissue baseline, Palpation of skin and subcutaneous tissue baseline  NEURO:   cassie " freely, [place]  PSYCH:  tearful, forgetful    Labs done in SNF are in [place] EPIC. Please refer to them using [employer]/Care Everywhere. and Recent labs in EPIC reviewed by me today.     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 Increased emotional outbursts without a specific trigger. We had decreased duloxetine based on age/renal function and changes in hearing. Seems ear symptoms not improved, will resume previous dosing due in increased tearfulness and as she is overwhelmed by the thought of another change right now. Reviewed management with social work who is following for discharge planning.   Plan: Increase duloxetine to 60 mg daily. 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 Medication changes in effective  Plan: follow-up with audiology s directed.      [facility] REC REQUIRED  Post Medication Reconciliation Status: patient was not discharged from an inpatient facility or TCU      Orders:  Increase duloxetine to 60 mg daily    Electronically signed by: YOHANNES Charles CNP         Sincerely,        YOHANNES Charles CNP

## 2024-11-19 NOTE — PROGRESS NOTES
"[facility] GERIATRICS    Chief Complaint   Patient presents with    Nursing Home Acute     HPI:  Moira Andre is a 89 year old  (9/24/1933), who is being seen today for an episodic care visit at: [facility]  () [[id number]]. Today's concern is: Seen today up on the unit in LTC, tearful coming off of the elevator. Assisted patient to a private setting where she reports \"I don't know what's wrong, I can't stop crying\" Patient explains she recently moved rooms after a roommate conflict. She is very thankful to have a private room but surprised to find herself still feeling unhappy. She is meeting with the  next week to discuss discharge planning and is hopeful about that. She is complimentary of nursing staff and activities available on site but feels notes she has always been independent and is struggling with uncertainty moving forward. She is \"overwhelmed\" at that thought of medication changes. She is still hearing music in quiet settings. She is denying CP, SOB, changes in b/b habits, SI/HI, dizziness or light headedness.     Allergies, and PMH/PSH reviewed in [facility] today.  REVIEW OF SYSTEMS:  Limited secondary to cognitive impairment but today pt reports the above and 4 point ROS including Respiratory, CV, GI and , other than that noted in the HPI,  is negative    Objective:   /71   Pulse 75   Temp 97.9  F (36.6  C)   Resp 18   Ht 1.702 m (5' 7\")   Wt 109.3 kg (241 lb)   SpO2 93%   BMI 37.75 kg/m    GENERAL APPEARANCE:  Alert  RESP:  respiratory effort and palpation of chest normal, no respiratory distress  CV:  regular rate and rhythm, no murmur, rub, or gallop, peripheral edema 1+ in both feet  M/S:   Gait and station abnormal transfers with assist  SKIN:  Inspection of skin and subcutaneous tissue baseline, Palpation of skin and subcutaneous tissue baseline  NEURO:   SANJANA crump  PSYCH:  tearful, forgetful    Labs done in SNF are in [facility]. Please " refer to them using EPIC/[employer] Everywhere. and Recent labs in [employer] reviewed by me today.     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 Increased emotional outbursts without a specific trigger. We had decreased duloxetine based on age/renal function and changes in hearing. Seems ear symptoms not improved, will resume previous dosing due in increased tearfulness and as she is overwhelmed by the thought of another change right now. Reviewed management with social work who is following for discharge planning.   Plan: Increase duloxetine to 60 mg daily. 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 Medication changes in effective  Plan: follow-up with audiology s directed.      MED REC REQUIRED  Post Medication Reconciliation Status: patient was not discharged from an inpatient facility or TCU      Orders:  Increase duloxetine to 60 mg daily    Electronically signed by: YOHANNES Charles CNP

## 2024-11-25 ENCOUNTER — LAB REQUISITION (OUTPATIENT)
Dept: LAB | Facility: CLINIC | Age: 89
End: 2024-11-25
Payer: COMMERCIAL

## 2024-11-25 DIAGNOSIS — E11.9 TYPE 2 DIABETES MELLITUS WITHOUT COMPLICATIONS (H): ICD-10-CM

## 2024-11-26 ENCOUNTER — NURSING HOME VISIT (OUTPATIENT)
Dept: GERIATRICS | Facility: CLINIC | Age: 89
End: 2024-11-26
Payer: COMMERCIAL

## 2024-11-26 VITALS
WEIGHT: 241 LBS | TEMPERATURE: 97.4 F | RESPIRATION RATE: 18 BRPM | HEART RATE: 69 BPM | BODY MASS INDEX: 37.83 KG/M2 | DIASTOLIC BLOOD PRESSURE: 75 MMHG | HEIGHT: 67 IN | OXYGEN SATURATION: 95 % | SYSTOLIC BLOOD PRESSURE: 115 MMHG

## 2024-11-26 DIAGNOSIS — J18.9 PNEUMONIA OF LEFT LOWER LOBE DUE TO INFECTIOUS ORGANISM: Primary | ICD-10-CM

## 2024-11-26 DIAGNOSIS — J90 PLEURAL EFFUSION: ICD-10-CM

## 2024-11-26 LAB
EST. AVERAGE GLUCOSE BLD GHB EST-MCNC: 171 MG/DL
HBA1C MFR BLD: 7.6 %

## 2024-11-26 PROCEDURE — 99309 SBSQ NF CARE MODERATE MDM 30: CPT

## 2024-11-26 NOTE — PROGRESS NOTES
"[facility] GERIATRICS    Chief Complaint   Patient presents with    RECHECK     HPI:  Moira Andre is a 89 year old  (9/24/1933), who is being seen today for an episodic care visit at: [facility]  () [[id number]]. Today's concern is: Seen today for follow-up up on the unit in LTC. Patient complained of shortness of breath 11/22 and CXR demonstrated LUQ infiltrate. She was started on Augmentin.     Allergies, and PMH/PSH reviewed in [facility] today.  REVIEW OF SYSTEMS:  4 point ROS including Respiratory, CV, GI and , other than that noted in the HPI,  is negative    Objective:   /75   Pulse 69   Temp 97.4  F (36.3  C)   Resp 18   Ht 1.702 m (5' 7\")   Wt 109.3 kg (241 lb)   SpO2 95%   BMI 37.75 kg/m    GENERAL APPEARANCE:  Alert, in no distress  RESP:  respiratory effort and palpation of chest normal, lungs clear to auscultation , no respiratory distress  CV:  regular rate and rhythm, no murmur, rub, or gallop, peripheral edema 1+ in BLE  ABDOMEN:  normal bowel sounds, soft, nontender, no hepatosplenomegaly or other masses  M/S:   Gait and station abnormal transfers with assist, wheelchair for mobility  SKIN:  Inspection of skin and subcutaneous tissue baseline, Palpation of skin and subcutaneous tissue baseline  NEURO:   matthews freely  PSYCH:  oriented X 3, forgetful    CXR  FINDINGS  There is a left base infiltrate There is a left pleural effusion There is a right perihilar nodular infiltrate measuring  cm A CAT  scan is recommended The heart is normal  There is no mass or adenopathy  There is no pneumothorax  The bones are normal  There is a pacemaker There is no tuberculosis  IMPRESSION  There is a left base infiltrate and left pleural effusion There is a 2 cm right perihilar nodular infiltrate A CAT scan is  recommended    Assessment/Plan:  (J18.9) Pneumonia of left lower lobe due to infectious organism  (primary encounter diagnosis)  (J90) Pleural effusion  Comment: acute pneumonia, " on review of imaging there is noted nodular infiltrate with recommendation for CT scan. Reviewed findings with patient today who is clinically improved, siting advanced age and goals of care she would not want to pursue aggressive workup or treatments. I think this is reasonable. Through shared decision making, we will repeat Xray in 6 weeks to check for resolution and can re-consider or readdress at that time, sooner if symptoms recur or new symptoms arise. Xray reviewed today. Nodule not noted in 8/2024 CXR.  Plan: Repeat CXR in 6 weeks. Monitor respiratory status. Continue Augmentin through 11/29. Continue Wixela BID, albuterol MDI PRN    MED REC REQUIRED  Post Medication Reconciliation Status: patient was not discharged from an inpatient facility or TCU      Orders:  Repeat CXR 1/7/24    Electronically signed by: YOHANNES Charles CNP

## 2024-11-26 NOTE — LETTER
" 11/26/2024      Moira Andre  [address]  [facility] [address]        [facility]    Chief Complaint   Patient presents with     RECHECK     HPI:  Moira Andre is a 89 year old  (9/24/1933), who is being seen today for an episodic care visit at: [facility]  () [[id number]]. Today's concern is: Seen today for follow-up up on the unit in LTC. Patient complained of shortness of breath 11/22 and CXR demonstrated LUQ infiltrate. She was started on Augmentin.     Allergies, and PMH/PSH reviewed in [facility] today.  REVIEW OF SYSTEMS:  4 point ROS including Respiratory, CV, GI and , other than that noted in the HPI,  is negative    Objective:   /75   Pulse 69   Temp 97.4  F (36.3  C)   Resp 18   Ht 1.702 m (5' 7\")   Wt 109.3 kg (241 lb)   SpO2 95%   BMI 37.75 kg/m    GENERAL APPEARANCE:  Alert, in no distress  RESP:  respiratory effort and palpation of chest normal, lungs clear to auscultation , no respiratory distress  CV:  regular rate and rhythm, no murmur, rub, or gallop, peripheral edema 1+ in BLE  ABDOMEN:  normal bowel sounds, soft, nontender, no hepatosplenomegaly or other masses  M/S:   Gait and station abnormal transfers with assist, wheelchair for mobility  SKIN:  Inspection of skin and subcutaneous tissue baseline, Palpation of skin and subcutaneous tissue baseline  NEURO:   matthews freely  PSYCH:  oriented X 3, forgetful    CXR  FINDINGS  There is a left base infiltrate There is a left pleural effusion There is a right perihilar nodular infiltrate measuring  cm A CAT  scan is recommended The heart is normal  There is no mass or adenopathy  There is no pneumothorax  The bones are normal  There is a pacemaker There is no tuberculosis  IMPRESSION  There is a left base infiltrate and left pleural effusion There is a 2 cm right perihilar nodular infiltrate A CAT scan is  recommended    Assessment/Plan:  (J18.9) Pneumonia of left lower lobe due to infectious " organism  (primary encounter diagnosis)  (J90) Pleural effusion  Comment: acute pneumonia, on review of imaging there is noted nodular infiltrate with recommendation for CT scan. Reviewed findings with patient today who is clinically improved, siting advanced age and goals of care she would not want to pursue aggressive workup or treatments. I think this is reasonable. Through shared decision making, we will repeat Xray in 6 weeks to check for resolution and can re-consider or readdress at that time, sooner if symptoms recur or new symptoms arise. Xray reviewed today. Nodule not noted in 8/2024 CXR.  Plan: Repeat CXR in 6 weeks. Monitor respiratory status. Continue Augmentin through 11/29. Continue Wixela BID, albuterol MDI PRN    MED REC REQUIRED  Post Medication Reconciliation Status: patient was not discharged from an inpatient facility or TCU      Orders:  Repeat CXR 1/7/24    Electronically signed by: YOHANNES Charles CNP         Sincerely,        YOHANNES Charles CNP

## 2024-11-27 ENCOUNTER — TELEPHONE (OUTPATIENT)
Dept: GERIATRICS | Facility: CLINIC | Age: 89
End: 2024-11-27
Payer: COMMERCIAL

## 2024-11-27 NOTE — CONFIDENTIAL NOTE
[facility] Telephone Encounter    HPI: 89 yo with asthma, CHF in LTC on treatment with Augmentin with concern for pneumonia    Reason for Call: Nursing reporting patient was hypoxic with therapy today but recovered at rest. She is also up 4 lbs this morning. No significant edema    Onset: sudden     A/P: Acute weight gain and HENDERSON concerning for CHF exacerbation, she has concurrent pneumonia which may be contributing to hypoxia. She did have small pleureal effusion andreihilar nodule on imaging and CT was recommended, per discussion with patient yesterday she did not want to pursue currently per goals of care. Will increase torsemide and check labs Friday. Monitor respiratory status, weights, exam.    Imaging Ordered: No    Labs Ordered: yes: Labs to be drawn: CBC, BMP, pro calcitonin date11/29/24      Sent to ED:  No    Orders  Moira Andre  9/24/1933     CBC, BMP, Pro calcitonin 11/29  Increase torsemide to 40 mg x 2 doses, then resume toresmide 20 mg BID  Kcl 20 mEq daily x 2 days      YOHANNES Charles CNP on 11/27/2024 at 10:05 AM

## 2024-11-28 ENCOUNTER — LAB REQUISITION (OUTPATIENT)
Dept: LAB | Facility: CLINIC | Age: 89
End: 2024-11-28
Payer: COMMERCIAL

## 2024-11-28 DIAGNOSIS — R09.02 HYPOXEMIA: ICD-10-CM

## 2024-11-28 DIAGNOSIS — I50.22 CHRONIC SYSTOLIC (CONGESTIVE) HEART FAILURE (H): ICD-10-CM

## 2024-11-29 LAB
ANION GAP SERPL CALCULATED.3IONS-SCNC: 15 MMOL/L (ref 7–15)
BUN SERPL-MCNC: 26.5 MG/DL (ref 8–23)
CALCIUM SERPL-MCNC: 9.2 MG/DL (ref 8.8–10.4)
CHLORIDE SERPL-SCNC: 102 MMOL/L (ref 98–107)
CREAT SERPL-MCNC: 1.96 MG/DL (ref 0.51–0.95)
EGFRCR SERPLBLD CKD-EPI 2021: 24 ML/MIN/1.73M2
ERYTHROCYTE [DISTWIDTH] IN BLOOD BY AUTOMATED COUNT: 16.3 % (ref 10–15)
GLUCOSE SERPL-MCNC: 125 MG/DL (ref 70–99)
HCO3 SERPL-SCNC: 24 MMOL/L (ref 22–29)
HCT VFR BLD AUTO: 37.3 % (ref 35–47)
HGB BLD-MCNC: 11.1 G/DL (ref 11.7–15.7)
MCH RBC QN AUTO: 24.9 PG (ref 26.5–33)
MCHC RBC AUTO-ENTMCNC: 29.8 G/DL (ref 31.5–36.5)
MCV RBC AUTO: 84 FL (ref 78–100)
PLATELET # BLD AUTO: 355 10E3/UL (ref 150–450)
POTASSIUM SERPL-SCNC: 4.3 MMOL/L (ref 3.4–5.3)
PROCALCITONIN SERPL IA-MCNC: 0.08 NG/ML
RBC # BLD AUTO: 4.45 10E6/UL (ref 3.8–5.2)
SODIUM SERPL-SCNC: 141 MMOL/L (ref 135–145)
WBC # BLD AUTO: 9 10E3/UL (ref 4–11)

## 2024-11-29 PROCEDURE — P9604 ONE-WAY ALLOW PRORATED TRIP: HCPCS | Mod: ORL

## 2024-11-29 PROCEDURE — 85027 COMPLETE CBC AUTOMATED: CPT | Mod: ORL

## 2024-11-29 PROCEDURE — 80048 BASIC METABOLIC PNL TOTAL CA: CPT | Mod: ORL

## 2024-11-29 PROCEDURE — 84145 PROCALCITONIN (PCT): CPT | Mod: ORL

## 2024-11-29 PROCEDURE — 36415 COLL VENOUS BLD VENIPUNCTURE: CPT | Mod: ORL

## 2024-12-08 ENCOUNTER — LAB REQUISITION (OUTPATIENT)
Dept: LAB | Facility: CLINIC | Age: 89
End: 2024-12-08
Payer: COMMERCIAL

## 2024-12-08 DIAGNOSIS — I50.42 CHRONIC COMBINED SYSTOLIC (CONGESTIVE) AND DIASTOLIC (CONGESTIVE) HEART FAILURE (H): ICD-10-CM

## 2024-12-09 ENCOUNTER — TELEPHONE (OUTPATIENT)
Dept: GERIATRICS | Facility: CLINIC | Age: 89
End: 2024-12-09

## 2024-12-09 LAB
ANION GAP SERPL CALCULATED.3IONS-SCNC: 16 MMOL/L (ref 7–15)
BUN SERPL-MCNC: 28.1 MG/DL (ref 8–23)
CALCIUM SERPL-MCNC: 8.8 MG/DL (ref 8.8–10.4)
CHLORIDE SERPL-SCNC: 103 MMOL/L (ref 98–107)
CREAT SERPL-MCNC: 2.1 MG/DL (ref 0.51–0.95)
EGFRCR SERPLBLD CKD-EPI 2021: 22 ML/MIN/1.73M2
ERYTHROCYTE [DISTWIDTH] IN BLOOD BY AUTOMATED COUNT: 16.7 % (ref 10–15)
GLUCOSE SERPL-MCNC: 128 MG/DL (ref 70–99)
HCO3 SERPL-SCNC: 23 MMOL/L (ref 22–29)
HCT VFR BLD AUTO: 38.5 % (ref 35–47)
HGB BLD-MCNC: 11.6 G/DL (ref 11.7–15.7)
MCH RBC QN AUTO: 25.5 PG (ref 26.5–33)
MCHC RBC AUTO-ENTMCNC: 30.1 G/DL (ref 31.5–36.5)
MCV RBC AUTO: 85 FL (ref 78–100)
NT-PROBNP SERPL-MCNC: 5815 PG/ML (ref 0–1800)
PLATELET # BLD AUTO: 305 10E3/UL (ref 150–450)
POTASSIUM SERPL-SCNC: 4.2 MMOL/L (ref 3.4–5.3)
RBC # BLD AUTO: 4.55 10E6/UL (ref 3.8–5.2)
SODIUM SERPL-SCNC: 142 MMOL/L (ref 135–145)
WBC # BLD AUTO: 10.3 10E3/UL (ref 4–11)

## 2024-12-09 PROCEDURE — 36415 COLL VENOUS BLD VENIPUNCTURE: CPT | Mod: ORL

## 2024-12-09 PROCEDURE — 83880 ASSAY OF NATRIURETIC PEPTIDE: CPT | Mod: ORL

## 2024-12-09 PROCEDURE — 80048 BASIC METABOLIC PNL TOTAL CA: CPT | Mod: ORL

## 2024-12-09 PROCEDURE — 85027 COMPLETE CBC AUTOMATED: CPT | Mod: ORL

## 2024-12-09 PROCEDURE — P9604 ONE-WAY ALLOW PRORATED TRIP: HCPCS | Mod: ORL

## 2024-12-09 NOTE — TELEPHONE ENCOUNTER
----- Message from Frannie MONCADA sent at 12/9/2024 11:45 AM CST -----    ----- Message -----  From: Halima Mayorga MD  Sent: 12/9/2024  11:37 AM CST  To: Frannie Lorenzo RN    Looks like 4-5k is her baseline BNP.    Up to 14k with last exacerbation.     Give extra 20 mg torsemide daily for next 2 days      Recheck BMP one week   gc  ----- Message -----  From: Frannie Lorenzo RN  Sent: 12/9/2024  11:11 AM CST  To: Halima Mayorga MD    Routed to covering provider.    A/P: Acute weight gain, SOB, hypoxia concerning for CHF exacerbation. Recent pna with right perihilar nodular infiltrate, patient declined CT at that time per goals of care. Received addiitonal torsemide 11/27 with weight gain and hypoxia, CBC, BMP, pro calcitonin were grossly normal at that time.

## 2024-12-12 ENCOUNTER — NURSING HOME VISIT (OUTPATIENT)
Dept: GERIATRICS | Facility: CLINIC | Age: 89
End: 2024-12-12
Payer: COMMERCIAL

## 2024-12-12 VITALS
BODY MASS INDEX: 37.35 KG/M2 | SYSTOLIC BLOOD PRESSURE: 168 MMHG | OXYGEN SATURATION: 94 % | WEIGHT: 238 LBS | RESPIRATION RATE: 20 BRPM | TEMPERATURE: 97.5 F | HEIGHT: 67 IN | HEART RATE: 71 BPM | DIASTOLIC BLOOD PRESSURE: 84 MMHG

## 2024-12-12 DIAGNOSIS — I50.9 ACUTE ON CHRONIC CONGESTIVE HEART FAILURE, UNSPECIFIED HEART FAILURE TYPE (H): Primary | ICD-10-CM

## 2024-12-12 PROCEDURE — 99309 SBSQ NF CARE MODERATE MDM 30: CPT

## 2024-12-12 NOTE — PROGRESS NOTES
"[facility] GERIATRICS    Chief Complaint   Patient presents with    Nursing Home Acute     HPI:  Moira Andre is a 89 year old  (9/24/1933), who is being seen today for an episodic care visit at: [facility]  () [[id number]]. Today's concern is: Seen today for follow-up on recent HF exacerbation and pneumonia with perihilar infiltrate. She received increased torsemide 60 mg 11/27, 12/6-12/9, then 80 mg 12/10-12/11. Seen today for follow-up in her room in TCU. She is satting 93% on room air and denies shortness of breath, cough. She reports she is having a rough day as her cat is being put down today. She is tearful reminiscing about her cat, Kayla.    Per nursing, doing much better without HENDERSON.     Allergies, and PMH/PSH reviewed in EPIC today.  REVIEW OF SYSTEMS:  4 point ROS including Respiratory, CV, GI and , other than that noted in the HPI,  is negative    Objective:   BP (!) 168/84   Pulse 71   Temp 97.5  F (36.4  C)   Resp 20   Ht 1.702 m (5' 7\")   Wt 108 kg (238 lb)   SpO2 94%   BMI 37.28 kg/m    GENERAL APPEARANCE:  Alert, in no distress  RESP:  respiratory effort and palpation of chest normal, lungs clear to auscultation , no respiratory distress  CV:  regular rate and rhythm, no murmur, rub, or gallop, no edema  ABDOMEN:  normal bowel sounds, soft, nontender, no hepatosplenomegaly or other masses  M/S:   Gait and station abnormal transfers with assist, wheelchair for mobility  SKIN:  Inspection of skin and subcutaneous tissue baseline, Palpation of skin and subcutaneous tissue baseline  NEURO:   matthews freely  PSYCH:  mood appropriate for the situation    Labs done in SNF are in [facility]. Please refer to them using EPIC/Care Everywhere. and Recent labs in [facility] reviewed by me today.     Assessment/Plan:  (I50.9) Acute on chronic congestive heart failure, unspecified heart failure type (H)  (primary encounter diagnosis)  Comment: acute, with weight gain and new HENDERSON, " oxygen needs, now resolving. Received additional torsemide x 7 days, Cr mildly elevated from baseline, will resume PTA 40 mg daily but consolidate in a single dose vs BID.   Creatinine   Date Value Ref Range Status   12/16/2024 1.88 (H) 0.51 - 0.95 mg/dL Final   02/18/2021 1.50 (H) 0.52 - 1.04 mg/dL Final     Wt Readings from Last 4 Encounters:   12/12/24 108 kg (238 lb)   11/26/24 109.3 kg (241 lb)   11/21/24 109.7 kg (241 lb 12.8 oz)   11/22/24 109.8 kg (242 lb)   Plan: Wean O2 as tolerated. Continue metoprolol 75 mg daily. Change torsemide to 40 mg daily, Kcl 20 mEq daily. Repeat BMP 12/16. Monitor respiratory status, weights, exam.    MED REC REQUIRED  Post Medication Reconciliation Status: discharge medications reconciled and changed, per note/orders      Orders:  Change torsemide to 40 mg daily.   BMP 12/16   Wean O2 as tolerated      Electronically signed by: YOHANNES Charles CNP

## 2024-12-12 NOTE — LETTER
" 12/12/2024      Moira Andre  [address]  [facility] [address]        [facility] GERIATRICS    Chief Complaint   Patient presents with     Nursing Home Acute     HPI:  Moira Andre is a 89 year old  (9/24/1933), who is being seen today for an episodic care visit at: [facility]  () [[id number]]. Today's concern is: Seen today for follow-up on recent HF exacerbation and pneumonia with perihilar infiltrate. She received increased torsemide 60 mg 11/27, 12/6-12/9, then 80 mg 12/10-12/11. Seen today for follow-up in her room in TCU. She is satting 93% on room air and denies shortness of breath, cough. She reports she is having a rough day as her cat is being put down today. She is tearful reminiscing about her cat, Kayla.    Per nursing, doing much better without HENDERSON.     Allergies, and PMH/PSH reviewed in [facility] today.  REVIEW OF SYSTEMS:  4 point ROS including Respiratory, CV, GI and , other than that noted in the HPI,  is negative    Objective:   BP (!) 168/84   Pulse 71   Temp 97.5  F (36.4  C)   Resp 20   Ht 1.702 m (5' 7\")   Wt 108 kg (238 lb)   SpO2 94%   BMI 37.28 kg/m    GENERAL APPEARANCE:  Alert, in no distress  RESP:  respiratory effort and palpation of chest normal, lungs clear to auscultation , no respiratory distress  CV:  regular rate and rhythm, no murmur, rub, or gallop, no edema  ABDOMEN:  normal bowel sounds, soft, nontender, no hepatosplenomegaly or other masses  M/S:   Gait and station abnormal transfers with assist, wheelchair for mobility  SKIN:  Inspection of skin and subcutaneous tissue baseline, Palpation of skin and subcutaneous tissue baseline  NEURO:   matthews freely  PSYCH:  mood appropriate for the situation    Labs done in SNF are in [facility]. Please refer to them using EPIC/Care Everywhere. and Recent labs in [facility] reviewed by me today.     Assessment/Plan:  (I50.9) Acute on chronic congestive heart failure, unspecified heart failure " type (H)  (primary encounter diagnosis)  Comment: acute, with weight gain and new HENDERSON, oxygen needs, now resolving. Received additional torsemide x 7 days, Cr mildly elevated from baseline, will resume PTA 40 mg daily but consolidate in a single dose vs BID.   Creatinine   Date Value Ref Range Status   12/16/2024 1.88 (H) 0.51 - 0.95 mg/dL Final   02/18/2021 1.50 (H) 0.52 - 1.04 mg/dL Final     Wt Readings from Last 4 Encounters:   12/12/24 108 kg (238 lb)   11/26/24 109.3 kg (241 lb)   11/21/24 109.7 kg (241 lb 12.8 oz)   11/22/24 109.8 kg (242 lb)   Plan: Wean O2 as tolerated. Continue metoprolol 75 mg daily. Change torsemide to 40 mg daily, Kcl 20 mEq daily. Repeat BMP 12/16. Monitor respiratory status, weights, exam.    MED REC REQUIRED  Post Medication Reconciliation Status: discharge medications reconciled and changed, per note/orders      Orders:  Change torsemide to 40 mg daily.   BMP 12/16   Wean O2 as tolerated      Electronically signed by: YOHANNES Charles CNP         Sincerely,        YOHANNES Charles CNP

## 2024-12-15 ENCOUNTER — LAB REQUISITION (OUTPATIENT)
Dept: LAB | Facility: CLINIC | Age: 89
End: 2024-12-15
Payer: COMMERCIAL

## 2024-12-15 DIAGNOSIS — I50.22 CHRONIC SYSTOLIC (CONGESTIVE) HEART FAILURE (H): ICD-10-CM

## 2024-12-16 LAB
ANION GAP SERPL CALCULATED.3IONS-SCNC: 18 MMOL/L (ref 7–15)
BUN SERPL-MCNC: 24.3 MG/DL (ref 8–23)
CALCIUM SERPL-MCNC: 9.2 MG/DL (ref 8.8–10.4)
CHLORIDE SERPL-SCNC: 98 MMOL/L (ref 98–107)
CREAT SERPL-MCNC: 1.88 MG/DL (ref 0.51–0.95)
EGFRCR SERPLBLD CKD-EPI 2021: 25 ML/MIN/1.73M2
GLUCOSE SERPL-MCNC: 222 MG/DL (ref 70–99)
HCO3 SERPL-SCNC: 23 MMOL/L (ref 22–29)
POTASSIUM SERPL-SCNC: 4.5 MMOL/L (ref 3.4–5.3)
SODIUM SERPL-SCNC: 139 MMOL/L (ref 135–145)

## 2024-12-16 PROCEDURE — 80048 BASIC METABOLIC PNL TOTAL CA: CPT | Mod: ORL

## 2024-12-16 PROCEDURE — P9604 ONE-WAY ALLOW PRORATED TRIP: HCPCS | Mod: ORL

## 2024-12-16 PROCEDURE — 36415 COLL VENOUS BLD VENIPUNCTURE: CPT | Mod: ORL

## 2024-12-18 RX ORDER — TORSEMIDE 20 MG/1
40 TABLET ORAL DAILY
COMMUNITY

## 2024-12-22 ENCOUNTER — HEALTH MAINTENANCE LETTER (OUTPATIENT)
Age: 89
End: 2024-12-22

## 2025-01-02 ENCOUNTER — NURSING HOME VISIT (OUTPATIENT)
Dept: GERIATRICS | Facility: CLINIC | Age: OVER 89
End: 2025-01-02
Payer: COMMERCIAL

## 2025-01-02 VITALS — HEIGHT: 67 IN | WEIGHT: 238 LBS | BODY MASS INDEX: 37.35 KG/M2

## 2025-01-02 DIAGNOSIS — R91.8 ABNORMAL X-RAY OF LUNG: ICD-10-CM

## 2025-01-02 DIAGNOSIS — J45.20 INTERMITTENT ASTHMA WITHOUT COMPLICATION, UNSPECIFIED ASTHMA SEVERITY: ICD-10-CM

## 2025-01-02 DIAGNOSIS — E66.2 OBESITY HYPOVENTILATION SYNDROME (H): ICD-10-CM

## 2025-01-02 DIAGNOSIS — I50.32 CHRONIC HEART FAILURE WITH PRESERVED EJECTION FRACTION (HFPEF) (H): Primary | ICD-10-CM

## 2025-01-02 NOTE — PROGRESS NOTES
"[facility] GERIATRICS    Chief Complaint   Patient presents with    RECHECK     HPI:  Moiar Andre is a 89 year old  (9/24/1933), who is being seen today for an episodic care visit at: [facility]  () [[id number]]. Today's concern is: Seen today for follow-up on CHF, pneumonia. Treated for pneumonia 11/22 with perihilar nodular infiltrate noted on imaging, completed a course of Augmentin. She had weight gain 5 lbs 12/6 and new hypoxia and HENDERSON, placed on oxygen at that time and received increased dose of torsemide 12/6-12/11 with weight loss and resolution of hypoxia 12/12 during follow-up. Orders were written to wean supplemental oxygen at that time. Seen today in LTC up to wheelchair. She is headed to an activity. She is still wearing supplemental O2. Denies HENDERSON, SOB. On chart review, SPO2 89%+  since 12/12 both on room air and with exertion.    Allergies, and PMH/PSH reviewed in [facility] today.  REVIEW OF SYSTEMS:  Limited secondary to cognitive impairment but today pt reports the above    Objective:   Ht 1.702 m (5' 7\")   Wt 108 kg (238 lb)   BMI 37.28 kg/m    GENERAL APPEARANCE:  Alert, in no distress  RESP:  respiratory effort and palpation of chest normal, lungs clear to auscultation , no respiratory distress  CV:  regular rate and rhythm, no murmur, rub, or gallop, peripheral edema 1+ in ankles  NEURO:   MEREDITH  PSYCH:  memory impaired , affect and mood normal    Labs done in SNF are in [facility]. Please refer to them using [employer]/Care Everywhere. and Recent labs in [facility] reviewed by me today.     Assessment/Plan:  (I50.32) Chronic heart failure with preserved ejection fraction (HFpEF) (H)  (primary encounter diagnosis)  (E66.2) Obesity hypoventilation syndrome (H)  (J45.20) Intermittent asthma without   complication, unspecified asthma severity  (R91.8) Abnormal CXR with nodular infiltrate  Comment: Chronic HF and obesity hypoventilation, satting >88% on RA with activity x 2 weeks. Unclear " why she remains on supplemental O2 and will discontinue. Treated for pneumonia in November, CXR at that time with nodular infiltrate and recommended follow-up CT. Per goals of care patient declined and she has follow-up xray scheduled 1/7/24. She appears euvolemic on exam, and weights are down. Respiratory status is stable. Management reviewed with nursing facility staff.  Plan: Discontinue supplemental O2. Continue toresmide 40 mg daily, Kcl 20 mEq daily, albuterol MDI Q6H PRN, fluticasone-salmeterol BID, monitor respiratory status, weights, exam.     MED REC REQUIRED  Post Medication Reconciliation Status: patient was not discharged from an inpatient facility or TCU      Orders:  Discontinue supplemental O2    Electronically signed by: YOHANNES Charles CNP

## 2025-01-02 NOTE — LETTER
" 1/2/2025      Moira Andre  [address]  [place] [address]        [facility] GERIATRICS    Chief Complaint   Patient presents with     RECHECK     HPI:  Moira Andre is a 89 year old  (9/24/1933), who is being seen today for an episodic care visit at: [facility]  () [[id number]]. Today's concern is: Seen today for follow-up on CHF, pneumonia. Treated for pneumonia 11/22 with perihilar nodular infiltrate noted on imaging, completed a course of Augmentin. She had weight gain 5 lbs 12/6 and new hypoxia and HENDERSON, placed on oxygen at that time and received increased dose of torsemide 12/6-12/11 with weight loss and resolution of hypoxia 12/12 during follow-up. Orders were written to wean supplemental oxygen at that time. Seen today in LTC up to wheelchair. She is headed to an activity. She is still wearing supplemental O2. Denies HENDERSON, SOB. On chart review, SPO2 89%+  since 12/12 both on room air and with exertion.    Allergies, and PMH/PSH reviewed in [facility] today.  REVIEW OF SYSTEMS:  Limited secondary to cognitive impairment but today pt reports the above    Objective:   Ht 1.702 m (5' 7\")   Wt 108 kg (238 lb)   BMI 37.28 kg/m    GENERAL APPEARANCE:  Alert, in no distress  RESP:  respiratory effort and palpation of chest normal, lungs clear to auscultation , no respiratory distress  CV:  regular rate and rhythm, no murmur, rub, or gallop, peripheral edema 1+ in ankles  NEURO:   MEREDITH  PSYCH:  memory impaired , affect and mood normal    Labs done in SNF are in [facility]. Please refer to them using [employer]/Care Everywhere. and Recent labs in [facility] reviewed by me today.     Assessment/Plan:  (I50.32) Chronic heart failure with preserved ejection fraction (HFpEF) (H)  (primary encounter diagnosis)  (E66.2) Obesity hypoventilation syndrome (H)  (J45.20) Intermittent asthma without   complication, unspecified asthma severity  (R91.8) Abnormal CXR with nodular infiltrate  Comment: " Chronic HF and obesity hypoventilation, satting >88% on RA with activity x 2 weeks. Unclear why she remains on supplemental O2 and will discontinue. Treated for pneumonia in November, CXR at that time with nodular infiltrate and recommended follow-up CT. Per goals of care patient declined and she has follow-up xray scheduled 1/7/24. She appears euvolemic on exam, and weights are down. Respiratory status is stable. Management reviewed with nursing facility staff.  Plan: Discontinue supplemental O2. Continue toresmide 40 mg daily, Kcl 20 mEq daily, albuterol MDI Q6H PRN, fluticasone-salmeterol BID, monitor respiratory status, weights, exam.     MED REC REQUIRED  Post Medication Reconciliation Status: patient was not discharged from an inpatient facility or TCU      Orders:  Discontinue supplemental O2    Electronically signed by: YOHANNES Charles CNP         Sincerely,        YOHANNES Charles CNP    Electronically signed

## 2025-01-07 ENCOUNTER — TELEPHONE (OUTPATIENT)
Dept: GERIATRICS | Facility: CLINIC | Age: OVER 89
End: 2025-01-07
Payer: COMMERCIAL

## 2025-01-07 DIAGNOSIS — R91.8 PULMONARY NODULES: Primary | ICD-10-CM

## 2025-01-07 NOTE — CONFIDENTIAL NOTE
[facility] Geriatrics Telephone Encounter    HPI: 89 yo female in LTC, hx of pneumonia, recent O2 requirements    Reason for Call: Nursing calling to report repeat CXR results        A/P: nodular infiltrate again noted on imaging. Copmleted a course of Augmentin in November. Declined CT at that time per goals of care. 0.3 pack year history of smoking. Spoke with MARISELA Flaherty today, given persistent nodular density she is interested in pursuing CT for additional information. She notes patient would unlikely undergo aggressive workup/treatment if cancerous but would like additional information for decision making. Family can assist with transport to CT. Respiratory status currently stable and patient is on RA. No active s/sx infection.    Imaging Ordered: Yes    Labs Ordered: no.    Sent to ED:  No    Orders  Moira Andre  9/24/1933     CT chest dx: pulmonary nodule Nursing please assist patient to schedule, coordinate with MARISELA Flaherty for transport      YOHANNES Charles CNP on 1/7/2025 at 2:55 PM

## 2025-01-09 ENCOUNTER — NURSING HOME VISIT (OUTPATIENT)
Dept: GERIATRICS | Facility: CLINIC | Age: OVER 89
End: 2025-01-09
Payer: COMMERCIAL

## 2025-01-09 VITALS
TEMPERATURE: 97.4 F | SYSTOLIC BLOOD PRESSURE: 117 MMHG | HEIGHT: 67 IN | RESPIRATION RATE: 18 BRPM | WEIGHT: 238 LBS | HEART RATE: 73 BPM | OXYGEN SATURATION: 93 % | BODY MASS INDEX: 37.35 KG/M2 | DIASTOLIC BLOOD PRESSURE: 73 MMHG

## 2025-01-09 DIAGNOSIS — R91.8 PULMONARY NODULES: ICD-10-CM

## 2025-01-09 DIAGNOSIS — F43.23 ADJUSTMENT DISORDER WITH MIXED ANXIETY AND DEPRESSED MOOD: Primary | ICD-10-CM

## 2025-01-09 DIAGNOSIS — K59.01 SLOW TRANSIT CONSTIPATION: ICD-10-CM

## 2025-01-09 DIAGNOSIS — Z87.01 HISTORY OF RECENT PNEUMONIA: ICD-10-CM

## 2025-01-09 DIAGNOSIS — I50.32 CHRONIC HEART FAILURE WITH PRESERVED EJECTION FRACTION (H): ICD-10-CM

## 2025-01-09 DIAGNOSIS — F32.1 MODERATE MAJOR DEPRESSION (H): ICD-10-CM

## 2025-01-09 PROCEDURE — 99309 SBSQ NF CARE MODERATE MDM 30: CPT

## 2025-01-09 NOTE — LETTER
1/9/2025      Moira Andre  [address]  [place] [address]        No notes on file      Sincerely,        YOHANNES Charles CNP    Electronically signed

## 2025-01-09 NOTE — PROGRESS NOTES
"[facility] GERIATRICS    Chief Complaint   Patient presents with    Nursing Home Acute     HPI:  Moira Andre is a 89 year old  (9/24/1933), who is being seen today for an episodic care visit at: [facility]  () [[id number]]. Today's concern is: ***    Allergies, and PMH/PSH reviewed in [facility] today.  REVIEW OF SYSTEMS:  {tbthuu43:599683}    Objective:   /73   Pulse 73   Temp 97.4  F (36.3  C)   Resp 18   Ht 1.702 m (5' 7\")   Wt 108 kg (238 lb)   SpO2 93%   BMI 37.28 kg/m    {Nursing home physical exam :314221}    {fgslab:536113}    Assessment/Plan:  {S DX2:744773}    MED REC REQUIRED{TIP  Click the link below to document or use med rec list, use list to pull in response :[id number]}  Post Medication Reconciliation Status: {MED REC LIST:522916}      Orders:  {fgsorders:379758}  ***    Electronically signed by: Silvia Mcpherson CNA ***      " "negative    Objective:   /73   Pulse 73   Temp 97.4  F (36.3  C)   Resp 18   Ht 1.702 m (5' 7\")   Wt 108 kg (238 lb)   SpO2 93%   BMI 37.28 kg/m    GENERAL APPEARANCE:  Alert, in no distress  RESP:  respiratory effort and palpation of chest normal, lungs clear to auscultation , no respiratory distress  CV:  regular rate and rhythm, no murmur, rub, or gallop, peripheral edema 1+ in BLE  ABDOMEN:  normal bowel sounds, soft, nontender, no hepatosplenomegaly or other masses  M/S:   Gait and station abnormal transfers with assist, pedals wheelchair with her feet  SKIN:  Inspection of skin and subcutaneous tissue baseline, Palpation of skin and subcutaneous tissue baseline  NEURO:   Moves extremities freely  PSYCH:  affect and mood normal, intermittently tearful, appropriate to situation    Labs done in SNF are in [facility]. Please refer to them using [employer]/Care Everywhere. and Recent labs in [facility] reviewed by me today.     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  Patient lost her home, car, pet cat and is struggling with a moved to long-term care.  Previously consulted ACP but do not see she has been seen.  She has been following with the .  She is on high-dose Cymbalta but seems to be tolerating this dose without side effects, did not tolerate dose reduction with increased tearfulness.  Plan: Reconsult ACP.  Continue Cymbalta 60 mg daily, Wellbutrin 100 mg daily, follow-up with  as directed.  Continue melatonin 3 mg at at bedtime for sleep.  Monitor mood, symptoms    (R91.8) Pulmonary nodules  (Z87.01) History of recent pneumonia  Comment: Nodular pulmonary infiltrate persistent on x-ray.  She did have recent pneumonia with acute hypoxia which resolved with a course of Augmentin.  Also with acute heart " failure exacerbation.  She has intermittently required oxygen but now stable times 2+ weeks without documentation of hypoxia on room air including with exertion.  Management reviewed with nursing, will order again to discontinue her oxygen, if she is having hypoxia or other symptoms this needs to be documented in the medical record and provider should be updated.  She is a distant smoker.  We will get a CT to better evaluate abnormality on imaging.  Plan: CT chest as ordered.  Monitor respiratory status.  Continue albuterol as needed, fluticasone salmeterol 1 inhalation twice daily    (I50.32) Chronic heart failure with preserved ejection fraction (H)  Comment: Chronic, with recent exacerbation.  Appears euvolemic on exam  Plan: Continue torsemide 40 mg daily, metoprolol 75 mg twice daily monitor weights, exam    (K59.01) Slow transit constipation  Comment: Chronic, more recently with diarrhea and fecal urgency.  We will discontinue senna S  Plan: Discontinue senna S, monitor bowel habits per nursing, LTC for assist with cares as needed      MED REC REQUIRED  Post Medication Reconciliation Status: patient was not discharged from an inpatient facility or TCU      Orders:  Discontinue oxygen  Reorder: ACP eval and treat  Discontinue senna S    Total time spent with patient visit, revisit at the skilled nursing facility was 44 minutes including patient visit, review of past records, and review of management with nursing facility staff.       Electronically signed by: YOHANNES Charles CNP

## 2025-01-13 ENCOUNTER — LAB REQUISITION (OUTPATIENT)
Dept: LAB | Facility: CLINIC | Age: OVER 89
End: 2025-01-13
Payer: COMMERCIAL

## 2025-01-13 DIAGNOSIS — J31.0 CHRONIC RHINITIS: ICD-10-CM

## 2025-01-13 DIAGNOSIS — R09.81 NASAL CONGESTION: ICD-10-CM

## 2025-01-13 LAB
FLUAV RNA SPEC QL NAA+PROBE: NEGATIVE
FLUBV RNA RESP QL NAA+PROBE: NEGATIVE
RSV RNA SPEC NAA+PROBE: NEGATIVE
SARS-COV-2 RNA RESP QL NAA+PROBE: NEGATIVE

## 2025-01-13 PROCEDURE — 87637 SARSCOV2&INF A&B&RSV AMP PRB: CPT | Mod: ORL

## 2025-01-24 ENCOUNTER — NURSING HOME VISIT (OUTPATIENT)
Dept: GERIATRICS | Facility: CLINIC | Age: OVER 89
End: 2025-01-24
Payer: COMMERCIAL

## 2025-01-24 VITALS
TEMPERATURE: 97.7 F | HEIGHT: 67 IN | WEIGHT: 217 LBS | HEART RATE: 68 BPM | DIASTOLIC BLOOD PRESSURE: 83 MMHG | BODY MASS INDEX: 34.06 KG/M2 | SYSTOLIC BLOOD PRESSURE: 135 MMHG | OXYGEN SATURATION: 94 % | RESPIRATION RATE: 18 BRPM

## 2025-01-24 DIAGNOSIS — R91.8 PULMONARY NODULES: ICD-10-CM

## 2025-01-24 DIAGNOSIS — F41.9 ANXIETY: ICD-10-CM

## 2025-01-24 DIAGNOSIS — E78.5 DYSLIPIDEMIA: ICD-10-CM

## 2025-01-24 DIAGNOSIS — I25.10 CORONARY ARTERY DISEASE INVOLVING NATIVE CORONARY ARTERY OF NATIVE HEART WITHOUT ANGINA PECTORIS: ICD-10-CM

## 2025-01-24 DIAGNOSIS — R41.89 COGNITIVE IMPAIRMENT: ICD-10-CM

## 2025-01-24 DIAGNOSIS — J45.20 INTERMITTENT ASTHMA WITHOUT COMPLICATION, UNSPECIFIED ASTHMA SEVERITY: ICD-10-CM

## 2025-01-24 DIAGNOSIS — I10 ESSENTIAL HYPERTENSION: ICD-10-CM

## 2025-01-24 DIAGNOSIS — E66.2 OBESITY HYPOVENTILATION SYNDROME (H): ICD-10-CM

## 2025-01-24 DIAGNOSIS — I48.91 ATRIAL FIBRILLATION WITH RVR (H): ICD-10-CM

## 2025-01-24 DIAGNOSIS — F32.1 MODERATE MAJOR DEPRESSION (H): Primary | ICD-10-CM

## 2025-01-24 DIAGNOSIS — G47.33 OSA ON CPAP: ICD-10-CM

## 2025-01-24 DIAGNOSIS — I50.32 CHRONIC HEART FAILURE WITH PRESERVED EJECTION FRACTION (H): ICD-10-CM

## 2025-01-24 DIAGNOSIS — H91.92 CHANGE IN HEARING OF LEFT EAR: ICD-10-CM

## 2025-01-24 DIAGNOSIS — K59.01 SLOW TRANSIT CONSTIPATION: ICD-10-CM

## 2025-01-24 DIAGNOSIS — E11.21 TYPE 2 DIABETES MELLITUS WITH DIABETIC NEPHROPATHY, WITHOUT LONG-TERM CURRENT USE OF INSULIN (H): ICD-10-CM

## 2025-01-24 DIAGNOSIS — N18.4 CKD (CHRONIC KIDNEY DISEASE) STAGE 4, GFR 15-29 ML/MIN (H): ICD-10-CM

## 2025-01-24 PROCEDURE — 99309 SBSQ NF CARE MODERATE MDM 30: CPT

## 2025-01-24 NOTE — PROGRESS NOTES
[facility] GERIATRICS  Chief Complaint   Patient presents with    Annual Comprehensive Nursing Home     [place] Medical Record Number:  [id number]  Place of Service where encounter took place:  [facility] (formerly [facility]3]  () [[id number]]    HPI:    Moira Andre  is a 89 year old  (9/24/1933), who is being seen today for an annual comprehensive visit. HPI information obtained from: facility chart records, facility staff, patient report, and [facility] chart review.     By chart review, patient was admitted to [facility] 7/30-8/7/24 for UTI and discharged to previous home. She was rehospitalized 8/23-8/28/24 with generalized weakness and a fall. In ED, labs remarkable for Cr 2.08, WBC 11.0. UA was abnormal. CT of the pelvis and lumbar spine were negative for fracture, high-grade spinal canal foraminal stenosis, noted 9 mm right kidney stone with moderate chronic fat stranding concerning for chronic UTI. CT of the head and cervical spine were negative for acute findings. She received IV ceftiraxone which was discontinued after UC returned without growth 8/25. Ortho was consulted for right hip pain and MRI did not demonstrate an acute fracture, demonstrated advanced degenerative changes with moderate effusion. Hospitalization complicated by encephalopathy attributed to pain medications and oxycodone was changed to tramadol with improvement. Cr improved with IVF. She was recommended TCU at discharge, admitted to current facility for ongoing medical management, rehab, nursing care. She transitioned to LTC after she was unable to return to previous living setting. Initial plan was to move to [place], but patient has benefited from additional support of LTC and current plan is to remain in LTC.  >Treated for pneumonia 11/2024 with acute shortness of breath and nodular infiltrate on imaging, symptoms improved. Repeat imaging showed persistent infiltrate and CT was recommended, pending.       Seen today for follow-up on  multiple medical conditions in her room in LTC up to wheelchair. She reports she is feeling well. She is attending activities frequently and has found a friend group in the dining room. She is very pleased to have her own room. She does occasionally report some dyspnea with exertion. She has maintained off of supplemental oxygen. She denies CP, SOB, changes in b/b habits, fever/chills, dizziness or light headedness.    ALLERGIES: Aspirin, Lidocaine, Lisinopril, Losartan, Metformin, Minocycline, Mounjaro [tirzepatide], Salicylates, Yellow dye, and Yellow dyes (non-tartrazine)  PAST MEDICAL HISTORY:   Past Medical History:   Diagnosis Date    Aortic valve stenosis     moderate    Arrhythmia     PAT, PVC    Aspirin allergy     Plavix use long term    Asthma     Chronic atrial fibrillation (H) 07/2024    failed meds,  s/p AVN ablation and VVI pacer    CKD (chronic kidney disease) stage 3, GFR 30-59 ml/min (H)     x 2007 atleast    Congestive heart failure, unspecified     Depression     Diabetes mellitus (H) 2010    Diastolic dysfunction, left ventricle 2013    grade 2, nl ef    Falls frequently     HTN (hypertension)     Lactic acidosis 08/2018    due to dehydration and metformin    Migraine headache     Mitral stenosis     moderate MS  mac    Myocardial infarction (H) 9/2007, cath 2013 ml    BMS: stent to OM, diag, nl EF, echo /C angia 2013 , f/u cath no lesion >40%    Nephrolithiasis     right side    OA (osteoarthritis) of knee     Obesity     Rheumatoid arthritis flare (H)     prednisone    Sleep apnea     restarted using cpap 2017    TIA (transient ischaemic attack)     Ventral hernia, unspecified, without mention of obstruction or gangrene       PAST SURGICAL HISTORY:  has a past surgical history that includes Cholecystectomy; Biopsy breast; appendectomy; 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      Current Outpatient Medications:     apixaban ANTICOAGULANT (ELIQUIS) 2.5 MG tablet, Take 1 tablet (2.5 mg) by mouth 2 times daily, Disp: 180 tablet, Rfl: 3    atorvastatin (LIPITOR) 20 MG tablet, Take 1 tablet (20 mg) by mouth daily for cholesterol, Disp: 100 tablet, Rfl: 3    blood glucose (ACCU-CHEK GUIDE) test strip, Use to test blood sugar once daily or as directed., Disp: 100 strip, Rfl: 3    blood glucose (ACCU-CHEK SOFTCLIX) lancing device, Lancing device to be used with lancets., Disp: 1 each, Rfl: 0    blood glucose monitoring (SOFTCLIX) lancets, Use to test blood sugar once daily., Disp: 100 each, Rfl: 3    buPROPion (WELLBUTRIN SR) 100 MG 12 hr tablet, Take 1 tablet (100 mg) by mouth daily for mood, Disp: 90 tablet, Rfl: 3    DULoxetine (CYMBALTA) 60 MG capsule, Take 1 capsule (60 mg) by mouth daily, Disp: 90 capsule, Rfl: 3    fluticasone-salmeterol (WIXELA INHUB) 100-50 MCG/ACT inhaler, USE 1 INHALATION BY MOUTH EVERY  12 HOURS, Disp: 180 each, Rfl: 3    melatonin 3 MG tablet, Take 1 tablet (3 mg) by mouth at bedtime., Disp: , Rfl:     Menthol, Topical Analgesic, (ICY HOT MEDICATED SPRAY EX), Externally apply topically daily as needed (pain, in neck, back, hand), Disp: , Rfl:     metoprolol tartrate (LOPRESSOR) 25 MG tablet, TAKE 3 TABLETS BY MOUTH TWICE  DAILY, Disp: 480 tablet, Rfl: 2    miconazole (MICATIN) 2 % external cream, Apply topically 2 times daily as needed for other (rash/irritation), Disp: , Rfl:     nitroGLYcerin (NITROSTAT) 0.4 MG sublingual tablet, FOR CHEST PAIN PLACE 1 TABLET UNDER THE TONGUE EVERY 5 MINUTES FOR 3 DOSES. IF SYMPTOMS PERSIST 5 MINUTES AFTER 1ST DOSE CALL 911, Disp: 25 tablet, Rfl: 0    torsemide (DEMADEX) 20 MG tablet, Take 40 mg by mouth daily., Disp: ,  Rfl:     acetaminophen (TYLENOL) 500 MG tablet, Take 2 tablets (1,000 mg) by mouth 3 times daily., Disp: 100 tablet, Rfl: 0    albuterol (PROAIR HFA/PROVENTIL HFA/VENTOLIN HFA) 108 (90 Base) MCG/ACT inhaler, Inhale 2 puffs into the lungs every 6 hours as needed for shortness of breath, wheezing or cough For shortness of breath, Disp: , Rfl:     calcium carbonate (TUMS) 500 MG chewable tablet, Take 1 chew tab by mouth 2 times daily as needed for heartburn, Disp: , Rfl:     Cholecalciferol (VITAMIN D) 125 MCG (5000 UT) capsule, Take 1 tablet by mouth every evening, Disp: , Rfl:     methocarbamol (ROBAXIN) 750 MG tablet, Take 1 tablet (750 mg) by mouth 4 times daily as needed for muscle spasms., Disp: , Rfl:     pantoprazole (PROTONIX) 20 MG EC tablet, Take 1 tablet (20 mg) by mouth daily, Disp: 90 tablet, Rfl: 3    triamcinolone (KENALOG) 0.1 % external cream, Apply topically 2 times daily as needed for irritation, Disp: , Rfl:      MED REC REQUIRED  Post Medication Reconciliation Status: discharge medications reconciled and changed, per note/orders      Case Management:  I have reviewed the facility/SNF care plan/MDS, including the falls risk, nutrition and pain screening. I also reviewed the current immunizations, and preventive care.. Future cancer screening is not clinically indicated secondary to age/goals of care. Patient's desire to return to the community is present, but is not able due to care needs . Current Level of Care is appropriate.mhgeroimmunization: Provider working with patient, first contact, and facility to coordinate    Advance Directive Discussion:    I reviewed the current advanced directives as reflected in EPIC, the POLST and the facility chart, and verified the congruency of orders. I contacted the first party Kristyn and discussed the plan of care. I did review the advance directives with the resident.     Team Discussion:  I communicated with the appropriate disciplines involved with the Plan  "of Care: Nursing  .   Patient's goal is: health maintenance, restorative with limits.  Information reviewed: Medications, vital signs, orders, and nursing notes.    ROS:  Limited secondary to cognitive impairment but today pt reports the above and 4 point ROS including Respiratory, CV, GI and , other than that noted in the HPI,  is negative    Vitals:  /83   Pulse 68   Temp 97.7  F (36.5  C)   Resp 18   Ht 1.702 m (5' 7\")   Wt 98.4 kg (217 lb)   SpO2 94%   BMI 33.99 kg/m   Body mass index is 33.99 kg/m .  Exam:  GENERAL APPEARANCE:  Alert, in no distress  RESP:  respiratory effort and palpation of chest normal, lungs clear to auscultation , no respiratory distress  CV:  regular rate and rhythm, no murmur, rub, or gallop, peripheral edema 1+ in BLE  ABDOMEN:  normal bowel sounds, soft, nontender, no hepatosplenomegaly or other masses  M/S:   Gait and station abnormal transfers with assist  SKIN:  Inspection of skin and subcutaneous tissue baseline, Palpation of skin and subcutaneous tissue baseline  NEURO:   matthews freely  PSYCH:  oriented X 3, memory impaired , affect and mood normal     Lab/Diagnostic data:   [facility] Please refer to them using [employer]Care Everywhere. and Recent labs in EPIC reviewed by me today.     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 Worsening symptoms after attempted GDR of duloxetine, tolerating higher dose  Plan: continue duloxetine 60 mg daily, bupropion 100 mg daily, melatonin for sleep, follow-up with  and psychology as directed, monitor mood, symptoms.     (I48.91) Atrial fibrillation with RVR (H)  Comment: chronic, s/p ablation an PPM 7/2024  Plan: monitor HR, continue metoprolol 75 mg BID, apixaban 2.5 mg bid for stroke ppx, follow-up with cardiology as directed    (I50.32) Chronic heart failure with preserved ejection fraction (H)  Comment: " chronic, diuresis somewhat limited by renal impairment.   Plan: continue torsemide 40 mg daily, metoprolol, monitor weights, exam. Follow-up with cardiology as directed    (N18.4) CKD (chronic kidney disease) stage 4, GFR 15-29 ml/min (H)  Comment: chronic, baseline Cr 1.8-2.1  Creatinine   Date Value Ref Range Status   12/16/2024 1.88 (H) 0.51 - 0.95 mg/dL Final   02/18/2021 1.50 (H) 0.52 - 1.04 mg/dL Final   02/17/2021 1.69 (H) 0.52 - 1.04 mg/dL Final   Plan: Avoid nephrotoxins. Renally dose medications as appropriate. Monitor BMP periodically    (I25.10) Coronary artery disease involving native coronary artery of native heart without angina pectoris  (E78.5) Dyslipidemia  (I10) Essential hypertension  Comment: chronic, BP fairly controlled  Plan: continue torsemide, metoprolol, atorvastatin 20 mg daily, NTG PRN. 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 Avoid hypoglycemia due to patient age, frailty, and risk for complications. Not on AC due to anticoagulation    Lab Results   Component Value Date    A1C 7.6 11/26/2024    A1C 7.2 06/27/2024    A1C 6.1 06/19/2019   Plan: Trend A1C, continue statin, ophthalmology and podiatry follow-up     (J45.20) Intermittent asthma without complication, unspecified asthma severity  Comment: chronic, with HENDERSON  Plan: monitor respiratory status, continue fluticasone salmeterol BID, albuterol MDI    (R41.89) Cognitive impairment  Comment: chronic, STML and cog scores consistent with dementia, low functional status  >SLUMS 16/30, CPT 4.9/5.6  Plan: monitor cognition, LTC for assist with ADLs, medication management, meals, activities    (R91.8) Pulmonary nodule  Comment: noted on xray 11/2023, acutely  ill and that time but infiltrate noted on imaging persists despite treatment.   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 Did not resolve with decreased duloxteine, stopping tramadol and fexofenadine, no longer using methocarbamol.   Plan: audiology follow-up         Orders:  Discontinue methocarbamol    Electronically signed by:  YOHANNES Charles CNP

## 2025-01-24 NOTE — LETTER
1/24/2025      Moira Andre  [address]  [facility] [address]        [facility] GERIATRICS  Chief Complaint   Patient presents with     Annual Comprehensive Nursing Home     [place] [facility] Record Number:  [id number]  Place of Service where encounter took place:  [facility]  () [[id number]]    HPI:    Moira Andre  is a 89 year old  (9/24/1933), who is being seen today for an annual comprehensive visit. HPI information obtained from: facility chart records, facility staff, patient report, and [facility] chart review.     By chart review, patient was admitted to [facility] 7/30-8/7/24 for UTI and discharged to previous home. She was rehospitalized 8/23-8/28/24 with generalized weakness and a fall. In ED, labs remarkable for Cr 2.08, WBC 11.0. UA was abnormal. CT of the pelvis and lumbar spine were negative for fracture, high-grade spinal canal foraminal stenosis, noted 9 mm right kidney stone with moderate chronic fat stranding concerning for chronic UTI. CT of the head and cervical spine were negative for acute findings. She received IV ceftiraxone which was discontinued after UC returned without growth 8/25. Ortho was consulted for right hip pain and MRI did not demonstrate an acute fracture, demonstrated advanced degenerative changes with moderate effusion. Hospitalization complicated by encephalopathy attributed to pain medications and oxycodone was changed to tramadol with improvement. Cr improved with IVF. She was recommended TCU at discharge, admitted to current facility for ongoing medical management, rehab, nursing care. She transitioned to LTC after she was unable to return to previous living setting. Initial plan was to move to [place], but patient has benefited from additional support of LTC and current plan is to remain in LTC.  >Treated for pneumonia 11/2024 with acute shortness of breath and nodular infiltrate on imaging, symptoms improved. Repeat imaging showed  persistent infiltrate and CT was recommended, pending.       Seen today for follow-up on multiple medical conditions in her room in LTC up to wheelchair. She reports she is feeling well. She is attending activities frequently and has found a friend group in the dining room. She is very pleased to have her own room. She does occasionally report some dyspnea with exertion. She has maintained off of supplemental oxygen. She denies CP, SOB, changes in b/b habits, fever/chills, dizziness or light headedness.    ALLERGIES: Aspirin, Lidocaine, Lisinopril, Losartan, Metformin, Minocycline, Mounjaro [tirzepatide], Salicylates, Yellow dye, and Yellow dyes (non-tartrazine)  PAST MEDICAL HISTORY:   Past Medical History:   Diagnosis Date     Aortic valve stenosis     moderate     Arrhythmia     PAT, PVC     Aspirin allergy     Plavix use long term     Asthma      Chronic atrial fibrillation (H) 07/2024    failed meds,  s/p AVN ablation and VVI pacer     CKD (chronic kidney disease) stage 3, GFR 30-59 ml/min (H)     x 2007 atleast     Congestive heart failure, unspecified      Depression      Diabetes mellitus (H) 2010     Diastolic dysfunction, left ventricle 2013    grade 2, nl ef     Falls frequently      HTN (hypertension)      Lactic acidosis 08/2018    due to dehydration and metformin     Migraine headache      Mitral stenosis     moderate MS  mac     Myocardial infarction (H) 9/2007, cath 2013 ml    BMS: stent to OM, diag, nl EF, echo /C angia 2013 , f/u cath no lesion >40%     Nephrolithiasis     right side     OA (osteoarthritis) of knee      Obesity      Rheumatoid arthritis flare (H)     prednisone     Sleep apnea     restarted using cpap 2017     TIA (transient ischaemic attack)      Ventral hernia, unspecified, without mention of obstruction or gangrene       PAST SURGICAL HISTORY:  has a past surgical history that includes Cholecystectomy; Biopsy breast; appendectomy; 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      Current Outpatient Medications:      apixaban ANTICOAGULANT (ELIQUIS) 2.5 MG tablet, Take 1 tablet (2.5 mg) by mouth 2 times daily, Disp: 180 tablet, Rfl: 3     atorvastatin (LIPITOR) 20 MG tablet, Take 1 tablet (20 mg) by mouth daily for cholesterol, Disp: 100 tablet, Rfl: 3     blood glucose (ACCU-CHEK GUIDE) test strip, Use to test blood sugar once daily or as directed., Disp: 100 strip, Rfl: 3     blood glucose (ACCU-CHEK SOFTCLIX) lancing device, Lancing device to be used with lancets., Disp: 1 each, Rfl: 0     blood glucose monitoring (SOFTCLIX) lancets, Use to test blood sugar once daily., Disp: 100 each, Rfl: 3     buPROPion (WELLBUTRIN SR) 100 MG 12 hr tablet, Take 1 tablet (100 mg) by mouth daily for mood, Disp: 90 tablet, Rfl: 3     DULoxetine (CYMBALTA) 60 MG capsule, Take 1 capsule (60 mg) by mouth daily, Disp: 90 capsule, Rfl: 3     fluticasone-salmeterol (WIXELA INHUB) 100-50 MCG/ACT inhaler, USE 1 INHALATION BY MOUTH EVERY  12 HOURS, Disp: 180 each, Rfl: 3     melatonin 3 MG tablet, Take 1 tablet (3 mg) by mouth at bedtime., Disp: , Rfl:      Menthol, Topical Analgesic, (ICY HOT MEDICATED SPRAY EX), Externally apply topically daily as needed (pain, in neck, back, hand), Disp: , Rfl:      metoprolol tartrate (LOPRESSOR) 25 MG tablet, TAKE 3 TABLETS BY MOUTH TWICE  DAILY, Disp: 480 tablet, Rfl: 2     miconazole (MICATIN) 2 % external cream, Apply topically 2 times daily as needed for other (rash/irritation), Disp: , Rfl:      nitroGLYcerin (NITROSTAT) 0.4 MG sublingual tablet, FOR CHEST PAIN PLACE 1 TABLET UNDER THE TONGUE EVERY 5 MINUTES FOR 3 DOSES. IF SYMPTOMS PERSIST 5  MINUTES AFTER 1ST DOSE CALL 911, Disp: 25 tablet, Rfl: 0     torsemide (DEMADEX) 20 MG tablet, Take 40 mg by mouth daily., Disp: , Rfl:      acetaminophen (TYLENOL) 500 MG tablet, Take 2 tablets (1,000 mg) by mouth 3 times daily., Disp: 100 tablet, Rfl: 0     albuterol (PROAIR HFA/PROVENTIL HFA/VENTOLIN HFA) 108 (90 Base) MCG/ACT inhaler, Inhale 2 puffs into the lungs every 6 hours as needed for shortness of breath, wheezing or cough For shortness of breath, Disp: , Rfl:      calcium carbonate (TUMS) 500 MG chewable tablet, Take 1 chew tab by mouth 2 times daily as needed for heartburn, Disp: , Rfl:      Cholecalciferol (VITAMIN D) 125 MCG (5000 UT) capsule, Take 1 tablet by mouth every evening, Disp: , Rfl:      methocarbamol (ROBAXIN) 750 MG tablet, Take 1 tablet (750 mg) by mouth 4 times daily as needed for muscle spasms., Disp: , Rfl:      pantoprazole (PROTONIX) 20 MG EC tablet, Take 1 tablet (20 mg) by mouth daily, Disp: 90 tablet, Rfl: 3     triamcinolone (KENALOG) 0.1 % external cream, Apply topically 2 times daily as needed for irritation, Disp: , Rfl:      MED REC REQUIRED  Post Medication Reconciliation Status: discharge medications reconciled and changed, per note/orders      Case Management:  I have reviewed the facility/SNF care plan/MDS, including the falls risk, nutrition and pain screening. I also reviewed the current immunizations, and preventive care.. Future cancer screening is not clinically indicated secondary to age/goals of care. Patient's desire to return to the community is present, but is not able due to care needs . Current Level of Care is appropriate.mhgeroimmunization: Provider working with patient, first contact, and facility to coordinate    Advance Directive Discussion:    I reviewed the current advanced directives as reflected in EPIC, the POLST and the facility chart, and verified the congruency of orders. I contacted the first party Kristyn and discussed the plan of care. I did  "review the advance directives with the resident.     Team Discussion:  I communicated with the appropriate disciplines involved with the Plan of Care: Nursing  .   Patient's goal is: health maintenance, restorative with limits.  Information reviewed: Medications, vital signs, orders, and nursing notes.    ROS:  Limited secondary to cognitive impairment but today pt reports the above and 4 point ROS including Respiratory, CV, GI and , other than that noted in the HPI,  is negative    Vitals:  /83   Pulse 68   Temp 97.7  F (36.5  C)   Resp 18   Ht 1.702 m (5' 7\")   Wt 98.4 kg (217 lb)   SpO2 94%   BMI 33.99 kg/m   Body mass index is 33.99 kg/m .  Exam:  GENERAL APPEARANCE:  Alert, in no distress  RESP:  respiratory effort and palpation of chest normal, lungs clear to auscultation , no respiratory distress  CV:  regular rate and rhythm, no murmur, rub, or gallop, peripheral edema 1+ in BLE  ABDOMEN:  normal bowel sounds, soft, nontender, no hepatosplenomegaly or other masses  M/S:   Gait and station abnormal transfers with assist  SKIN:  Inspection of skin and subcutaneous tissue baseline, Palpation of skin and subcutaneous tissue baseline  NEURO:   matthews freely  PSYCH:  oriented X 3, memory impaired , affect and mood normal     Lab/Diagnostic data:   Labs done in SNF are in [facility]. Please refer to them using [employer]/Care Everywhere. and Recent labs in [facility] reviewed by me today.     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 Worsening symptoms after attempted GDR of duloxetine, tolerating higher dose  Plan: continue duloxetine 60 mg daily, bupropion 100 mg daily, melatonin for sleep, follow-up with  and psychology as directed, monitor mood, symptoms.     (I48.91) Atrial fibrillation with RVR (H)  Comment: chronic, s/p ablation an PPM 7/2024  Plan: monitor HR, continue metoprolol 75 mg BID, apixaban 2.5 mg " bid for stroke ppx, follow-up with cardiology as directed    (I50.32) Chronic heart failure with preserved ejection fraction (H)  Comment: chronic, diuresis somewhat limited by renal impairment.   Plan: continue torsemide 40 mg daily, metoprolol, monitor weights, exam. Follow-up with cardiology as directed    (N18.4) CKD (chronic kidney disease) stage 4, GFR 15-29 ml/min (H)  Comment: chronic, baseline Cr 1.8-2.1  Creatinine   Date Value Ref Range Status   12/16/2024 1.88 (H) 0.51 - 0.95 mg/dL Final   02/18/2021 1.50 (H) 0.52 - 1.04 mg/dL Final   02/17/2021 1.69 (H) 0.52 - 1.04 mg/dL Final   Plan: Avoid nephrotoxins. Renally dose medications as appropriate. Monitor BMP periodically    (I25.10) Coronary artery disease involving native coronary artery of native heart without angina pectoris  (E78.5) Dyslipidemia  (I10) Essential hypertension  Comment: chronic, BP fairly controlled  Plan: continue torsemide, metoprolol, atorvastatin 20 mg daily, NTG PRN. 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 Avoid hypoglycemia due to patient age, frailty, and risk for complications. Not on AC due to anticoagulation    Lab Results   Component Value Date    A1C 7.6 11/26/2024    A1C 7.2 06/27/2024    A1C 6.1 06/19/2019   Plan: Trend A1C, continue statin, ophthalmology and podiatry follow-up     (J45.20) Intermittent asthma without complication, unspecified asthma severity  Comment: chronic, with HENDERSON  Plan: monitor respiratory status, continue fluticasone salmeterol BID, albuterol MDI    (R41.89) Cognitive impairment  Comment: chronic, STML and cog scores consistent with dementia, low functional status  >SLUMS 16/30, CPT 4.9/5.6  Plan: monitor  cognition, LTC for assist with ADLs, medication management, meals, activities    (R91.8) Pulmonary nodule  Comment: noted on xray 11/2023, acutely ill and that time but infiltrate noted on imaging persists despite treatment.   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 Did not resolve with decreased duloxteine, stopping tramadol and fexofenadine, no longer using methocarbamol.   Plan: audiology follow-up         Orders:  Discontinue methocarbamol    Electronically signed by:  YOHANNES Charles CNP         Sincerely,        YOHANNES Charles CNP    Electronically signed

## 2025-01-25 ENCOUNTER — HOSPITAL ENCOUNTER (OUTPATIENT)
Dept: CT IMAGING | Facility: CLINIC | Age: OVER 89
Discharge: HOME | End: 2025-01-25
Payer: COMMERCIAL

## 2025-01-25 DIAGNOSIS — R91.8 PULMONARY NODULES: ICD-10-CM

## 2025-01-25 PROCEDURE — 71250 CT THORAX DX C-: CPT

## 2025-01-28 ENCOUNTER — LAB REQUISITION (OUTPATIENT)
Dept: LAB | Facility: CLINIC | Age: OVER 89
End: 2025-01-28
Payer: COMMERCIAL

## 2025-01-28 ENCOUNTER — NURSING HOME VISIT (OUTPATIENT)
Dept: GERIATRICS | Facility: CLINIC | Age: OVER 89
End: 2025-01-28
Payer: COMMERCIAL

## 2025-01-28 VITALS
HEIGHT: 67 IN | BODY MASS INDEX: 34.84 KG/M2 | SYSTOLIC BLOOD PRESSURE: 162 MMHG | RESPIRATION RATE: 20 BRPM | WEIGHT: 222 LBS | TEMPERATURE: 98 F | HEART RATE: 83 BPM | OXYGEN SATURATION: 90 % | DIASTOLIC BLOOD PRESSURE: 90 MMHG

## 2025-01-28 DIAGNOSIS — J90 PLEURAL EFFUSION: ICD-10-CM

## 2025-01-28 DIAGNOSIS — R49.0 HOARSE VOICE QUALITY: Primary | ICD-10-CM

## 2025-01-28 DIAGNOSIS — Z71.89 ACP (ADVANCE CARE PLANNING): ICD-10-CM

## 2025-01-28 DIAGNOSIS — R05.9 COUGH, UNSPECIFIED: ICD-10-CM

## 2025-01-28 DIAGNOSIS — N28.9 KIDNEY LESION: ICD-10-CM

## 2025-01-28 DIAGNOSIS — I50.32 CHRONIC HEART FAILURE WITH PRESERVED EJECTION FRACTION (H): ICD-10-CM

## 2025-01-28 DIAGNOSIS — R91.8 PULMONARY NODULES: ICD-10-CM

## 2025-01-28 DIAGNOSIS — R93.5 ABNORMAL CT OF THE ABDOMEN: ICD-10-CM

## 2025-01-28 DIAGNOSIS — R59.1 LYMPHADENOPATHY: ICD-10-CM

## 2025-01-28 PROCEDURE — 99310 SBSQ NF CARE HIGH MDM 45: CPT

## 2025-01-28 NOTE — LETTER
1/28/2025      Moira Andre  [address]  [facility] [address]        No notes on file      Sincerely,        YOHANNES Charles CNP    Electronically signed

## 2025-01-28 NOTE — PROGRESS NOTES
"[facility] GERIATRICS    Chief Complaint   Patient presents with    Nursing Home Acute     HPI:  Moira Andre is a 89 year old  (9/24/1933), who is being seen today for an episodic care visit at: [facility]  () [[id number]]. Today's concern is: ***    Allergies, and PMH/PSH reviewed in [facility] today.  REVIEW OF SYSTEMS:  {isgesg03:618433}    Objective:   BP (!) 162/90   Pulse 83   Temp 98  F (36.7  C)   Resp 20   Ht 1.702 m (5' 7\")   Wt 100.7 kg (222 lb)   SpO2 (!) 90%   BMI 34.77 kg/m    {Nursing home physical exam :832872}    {fgslab:417745}    Assessment/Plan:  {FGS DX2:931503}    MED REC REQUIRED{TIP  Click the link below to document or use med rec list, use list to pull in response :089410}  Post Medication Reconciliation Status: {MED REC LIST:381504}      Orders:  {fgsorders:675567}  ***    Electronically signed by: Silvia Mcpherson CNA ***      " " Resp 20   Ht 1.702 m (5' 7\")   Wt 100.7 kg (222 lb)   SpO2 (!) 90%   BMI 34.77 kg/m    GENERAL APPEARANCE:  Alert, in no distress  ENT: Hoarse voice quality  RESP:  respiratory effort and palpation of chest normal, lungs clear to auscultation , no respiratory distress  CV:  regular rate and rhythm, no murmur, rub, or gallop, peripheral edema 1+ in BLE  ABDOMEN:  normal bowel sounds, soft, nontender, no hepatosplenomegaly or other masses  M/S:   Gait and station abnormal transfers with assist  SKIN:  Inspection of skin and subcutaneous tissue baseline, Palpation of skin and subcutaneous tissue baseline  NEURO:   Moves all extremities freely, follows commands  PSYCH:  oriented X 3, forgetful    Labs done in SNF are in [facility]. Please refer to them using [employer]/Care Everywhere. and Recent labs in [facility] reviewed by me today.     Assessment/Plan:  (R49.0) Hoarse voice quality  (primary encounter diagnosis)  Comment: Acute, doing generally well, nursing checking multiplex  Plan: Continue supportive measures, monitor symptoms    (I50.32) Chronic heart failure with preserved ejection fraction (H)  (J90) Pleural effusion  Comment: Acute on chronic, endorses some HENDERSON, patient's daughter-in-law Krystina reports shortness of breath in the morning and bilateral pleural effusions on imaging, possible atelectasis versus atypical infection.  Again she is feeling well clinically, possible some component of fluid overload and will attempt to increase diuretics but may be limited by kidney function  Plan: Increase torsemide to 40 mg daily in the morning, 20 mg every afternoon.  Continue metoprolol.  Not on ACE/ARB due to allergies.  Monitor weights, exam.  Follow-up with cardiology as directed    (R93.5) Abnormal CT of the abdomen  (R59.1) Lymphadenopathy  (N28.9) Kidney lesion  (R91.8) Pulmonary nodules  (Z71.89) ACP (advance care planning)  Comment: Several abnormal findings noted on CT to investigate pulmonary nodular " infiltrate noted on chest x-ray, likely incidental findings including lymphadenopathy, kidney masses, possible gastric fundus calcification.  No recent use of contrast.  Pulmonary nodules measure less than 6 cm; she does have a history of smoking.  I had a detailed discussion with patient and also with her daughter-in-law Krystina via telephone.  They were resistant to check this CT in the first place citing advanced age and declining quality of life, cognitive impairment, functional decline related to advanced age.  They are disinclined to pursue additional extensive workup at this time including renal ultrasound or endoscopy given her goals of care.  I think this is reasonable.  We did discuss repeating CT in a few months which I think is reasonable and minimally invasive.  Plan: Repeat CT in 3 months, sooner if new symptoms arise.  Monitor clinically.  DNR/DNI with intermediate measures per POLST.      MED REC REQUIRED  Post Medication Reconciliation Status: patient was not discharged from an inpatient facility or TCU      Orders:  Increase torsemide to 40 mg daily in the morning, 20 mg every afternoon  BMP 1/31/2025    Total time spent with patient visit at the skilled nursing facility was 55 minutes including patient visit and review of imaging, past records.       Electronically signed by: YOHANNES Charles CNP

## 2025-01-30 ENCOUNTER — LAB REQUISITION (OUTPATIENT)
Dept: LAB | Facility: CLINIC | Age: OVER 89
End: 2025-01-30
Payer: COMMERCIAL

## 2025-01-30 DIAGNOSIS — I50.42 CHRONIC COMBINED SYSTOLIC (CONGESTIVE) AND DIASTOLIC (CONGESTIVE) HEART FAILURE (H): ICD-10-CM

## 2025-01-31 LAB
ANION GAP SERPL CALCULATED.3IONS-SCNC: 18 MMOL/L (ref 7–15)
BUN SERPL-MCNC: 26.6 MG/DL (ref 8–23)
CALCIUM SERPL-MCNC: 9.3 MG/DL (ref 8.8–10.4)
CHLORIDE SERPL-SCNC: 100 MMOL/L (ref 98–107)
CREAT SERPL-MCNC: 1.95 MG/DL (ref 0.51–0.95)
EGFRCR SERPLBLD CKD-EPI 2021: 24 ML/MIN/1.73M2
GLUCOSE SERPL-MCNC: 114 MG/DL (ref 70–99)
HCO3 SERPL-SCNC: 22 MMOL/L (ref 22–29)
POTASSIUM SERPL-SCNC: 4.3 MMOL/L (ref 3.4–5.3)
SODIUM SERPL-SCNC: 140 MMOL/L (ref 135–145)

## 2025-01-31 PROCEDURE — 80048 BASIC METABOLIC PNL TOTAL CA: CPT | Mod: ORL

## 2025-01-31 PROCEDURE — P9604 ONE-WAY ALLOW PRORATED TRIP: HCPCS | Mod: ORL

## 2025-01-31 PROCEDURE — 36415 COLL VENOUS BLD VENIPUNCTURE: CPT | Mod: ORL

## 2025-02-06 ENCOUNTER — LAB REQUISITION (OUTPATIENT)
Dept: LAB | Facility: CLINIC | Age: OVER 89
End: 2025-02-06
Payer: COMMERCIAL

## 2025-02-06 DIAGNOSIS — I50.42 CHRONIC COMBINED SYSTOLIC (CONGESTIVE) AND DIASTOLIC (CONGESTIVE) HEART FAILURE (H): ICD-10-CM

## 2025-02-06 DIAGNOSIS — E11.21 TYPE 2 DIABETES MELLITUS WITH DIABETIC NEPHROPATHY (H): ICD-10-CM

## 2025-02-07 DIAGNOSIS — F32.1 MODERATE MAJOR DEPRESSION (H): ICD-10-CM

## 2025-02-07 LAB
ANION GAP SERPL CALCULATED.3IONS-SCNC: 16 MMOL/L (ref 7–15)
BUN SERPL-MCNC: 28.5 MG/DL (ref 8–23)
CALCIUM SERPL-MCNC: 9.3 MG/DL (ref 8.8–10.4)
CHLORIDE SERPL-SCNC: 99 MMOL/L (ref 98–107)
CREAT SERPL-MCNC: 2.01 MG/DL (ref 0.51–0.95)
EGFRCR SERPLBLD CKD-EPI 2021: 23 ML/MIN/1.73M2
EST. AVERAGE GLUCOSE BLD GHB EST-MCNC: 154 MG/DL
GLUCOSE SERPL-MCNC: 206 MG/DL (ref 70–99)
HBA1C MFR BLD: 7 %
HCO3 SERPL-SCNC: 23 MMOL/L (ref 22–29)
POTASSIUM SERPL-SCNC: 4.3 MMOL/L (ref 3.4–5.3)
SODIUM SERPL-SCNC: 138 MMOL/L (ref 135–145)

## 2025-02-07 PROCEDURE — 36415 COLL VENOUS BLD VENIPUNCTURE: CPT | Mod: ORL

## 2025-02-07 PROCEDURE — 80048 BASIC METABOLIC PNL TOTAL CA: CPT | Mod: ORL

## 2025-02-07 PROCEDURE — P9604 ONE-WAY ALLOW PRORATED TRIP: HCPCS | Mod: ORL

## 2025-02-07 PROCEDURE — 83036 HEMOGLOBIN GLYCOSYLATED A1C: CPT | Mod: ORL

## 2025-02-10 RX ORDER — BUPROPION HYDROCHLORIDE 100 MG/1
TABLET, EXTENDED RELEASE ORAL
Qty: 90 TABLET | Refills: 3 | Status: SHIPPED | OUTPATIENT
Start: 2025-02-10

## (undated) DEVICE — GOWN IMPERVIOUS SPECIALTY XLG/XLONG 32474

## (undated) DEVICE — BLADE KNIFE SURG 11 371111

## (undated) DEVICE — GUIDEWIRE VASC 0.035INX150CM INQWIRE J TIP IQ35F150J3F/A

## (undated) DEVICE — LINEN TOWEL PACK X5 5464

## (undated) DEVICE — ENDO TROCAR OPTICAL 05MM VERSAPORT PLUS W/FIX CAN ONB5STF

## (undated) DEVICE — CATH ABLTN 7FR 1-7-4MM SPACE 115 CML 4MML STRL LF BD7TCFJ4L

## (undated) DEVICE — ESU CORD MONOPOLAR 10'  E0510

## (undated) DEVICE — DEFIB PRO-PADZ LVP LQD GEL ADULT 8900-2105-01

## (undated) DEVICE — PREP CHLORAPREP 26ML TINTED ORANGE  260815

## (undated) DEVICE — CABLE PACING ALLIGATOR CLIP 12FT 5833SL

## (undated) DEVICE — PACK PCMKR PERM SRG PROC LF SAN32PC573

## (undated) DEVICE — RAD INTRODUCER KIT MICRO 5FRX10CM .018 NITINOL G/W

## (undated) DEVICE — SOL WATER IRRIG 1000ML BOTTLE 2F7114

## (undated) DEVICE — INTRO CATH 12CM 8.5FR FST-CATH

## (undated) DEVICE — SHEATH PRELUDE SNAP 7FRX13CM PLS-1007

## (undated) DEVICE — BLADE CLIPPER 4406

## (undated) DEVICE — DEVICE SUTURE GRASPER TROCAR CLOSURE 14GA PMITCSG

## (undated) DEVICE — ENDO TROCAR OPTICAL 12MM VERSAPORT PLUS W/FIX CAN ONB12STF

## (undated) DEVICE — SLITTER ADJSTBL 6232ADJ

## (undated) DEVICE — DRAPE BREAST/CHEST 29420

## (undated) DEVICE — PACK LAP CHOLE SLC15LCFSD

## (undated) DEVICE — SU MONOCRYL 4-0 PS-2 18" UND Y496G

## (undated) DEVICE — SU VICRYL 3-0 SH 27" J316H

## (undated) DEVICE — CATH GD 5.4FR ID / 7FR OD X 43

## (undated) DEVICE — ESU GROUND PAD UNIVERSAL W/O CORD

## (undated) DEVICE — GLOVE GAMMEX DERMAPRENE ULTRA SZ 8 LF 8516

## (undated) RX ORDER — FENTANYL CITRATE 50 UG/ML
INJECTION, SOLUTION INTRAMUSCULAR; INTRAVENOUS
Status: DISPENSED
Start: 2017-07-19

## (undated) RX ORDER — HEPARIN SODIUM 1000 [USP'U]/ML
INJECTION, SOLUTION INTRAVENOUS; SUBCUTANEOUS
Status: DISPENSED
Start: 2024-07-12

## (undated) RX ORDER — PROPOFOL 10 MG/ML
INJECTION, EMULSION INTRAVENOUS
Status: DISPENSED
Start: 2018-07-05

## (undated) RX ORDER — VERAPAMIL HYDROCHLORIDE 2.5 MG/ML
INJECTION, SOLUTION INTRAVENOUS
Status: DISPENSED
Start: 2017-07-19

## (undated) RX ORDER — BUPIVACAINE HYDROCHLORIDE 2.5 MG/ML
INJECTION, SOLUTION EPIDURAL; INFILTRATION; INTRACAUDAL
Status: DISPENSED
Start: 2024-07-12

## (undated) RX ORDER — LIDOCAINE HYDROCHLORIDE 10 MG/ML
INJECTION, SOLUTION EPIDURAL; INFILTRATION; INTRACAUDAL; PERINEURAL
Status: DISPENSED
Start: 2024-07-12

## (undated) RX ORDER — ONDANSETRON 2 MG/ML
INJECTION INTRAMUSCULAR; INTRAVENOUS
Status: DISPENSED
Start: 2024-07-12

## (undated) RX ORDER — FENTANYL CITRATE 50 UG/ML
INJECTION, SOLUTION INTRAMUSCULAR; INTRAVENOUS
Status: DISPENSED
Start: 2018-07-05

## (undated) RX ORDER — FENTANYL CITRATE 50 UG/ML
INJECTION, SOLUTION INTRAMUSCULAR; INTRAVENOUS
Status: DISPENSED
Start: 2024-07-12

## (undated) RX ORDER — REGADENOSON 0.08 MG/ML
INJECTION, SOLUTION INTRAVENOUS
Status: DISPENSED
Start: 2017-06-15

## (undated) RX ORDER — CEFAZOLIN SODIUM 2 G/100ML
INJECTION, SOLUTION INTRAVENOUS
Status: DISPENSED
Start: 2018-07-05

## (undated) RX ORDER — HEPARIN SODIUM 1000 [USP'U]/ML
INJECTION, SOLUTION INTRAVENOUS; SUBCUTANEOUS
Status: DISPENSED
Start: 2017-07-19